# Patient Record
Sex: FEMALE | Race: BLACK OR AFRICAN AMERICAN | NOT HISPANIC OR LATINO | Employment: OTHER | URBAN - METROPOLITAN AREA
[De-identification: names, ages, dates, MRNs, and addresses within clinical notes are randomized per-mention and may not be internally consistent; named-entity substitution may affect disease eponyms.]

---

## 2017-02-08 ENCOUNTER — ALLSCRIPTS OFFICE VISIT (OUTPATIENT)
Dept: OTHER | Facility: OTHER | Age: 57
End: 2017-02-08

## 2017-05-19 ENCOUNTER — ALLSCRIPTS OFFICE VISIT (OUTPATIENT)
Dept: OTHER | Facility: OTHER | Age: 57
End: 2017-05-19

## 2017-06-05 ENCOUNTER — TRANSCRIBE ORDERS (OUTPATIENT)
Dept: ADMINISTRATIVE | Facility: HOSPITAL | Age: 57
End: 2017-06-05

## 2017-06-05 ENCOUNTER — APPOINTMENT (OUTPATIENT)
Dept: LAB | Facility: MEDICAL CENTER | Age: 57
End: 2017-06-05
Payer: MEDICARE

## 2017-06-05 DIAGNOSIS — R80.9 PROTEINURIA: ICD-10-CM

## 2017-06-05 DIAGNOSIS — E11.39 TYPE 2 DIABETES MELLITUS WITH OTHER DIABETIC OPHTHALMIC COMPLICATION (HCC): ICD-10-CM

## 2017-06-05 DIAGNOSIS — I10 ESSENTIAL (PRIMARY) HYPERTENSION: ICD-10-CM

## 2017-06-05 DIAGNOSIS — E78.5 HYPERLIPIDEMIA: ICD-10-CM

## 2017-06-05 DIAGNOSIS — E11.65 TYPE 2 DIABETES MELLITUS WITH HYPERGLYCEMIA (HCC): ICD-10-CM

## 2017-06-05 LAB
ALBUMIN SERPL BCP-MCNC: 3.3 G/DL (ref 3.5–5)
ALP SERPL-CCNC: 183 U/L (ref 46–116)
ALT SERPL W P-5'-P-CCNC: 24 U/L (ref 12–78)
ANION GAP SERPL CALCULATED.3IONS-SCNC: 6 MMOL/L (ref 4–13)
AST SERPL W P-5'-P-CCNC: 17 U/L (ref 5–45)
BILIRUB SERPL-MCNC: 0.76 MG/DL (ref 0.2–1)
BUN SERPL-MCNC: 16 MG/DL (ref 5–25)
CALCIUM SERPL-MCNC: 9.2 MG/DL (ref 8.3–10.1)
CHLORIDE SERPL-SCNC: 109 MMOL/L (ref 100–108)
CO2 SERPL-SCNC: 29 MMOL/L (ref 21–32)
CREAT SERPL-MCNC: 0.84 MG/DL (ref 0.6–1.3)
CREAT UR-MCNC: 155 MG/DL
EST. AVERAGE GLUCOSE BLD GHB EST-MCNC: 154 MG/DL
GFR SERPL CREATININE-BSD FRML MDRD: >60 ML/MIN/1.73SQ M
GLUCOSE P FAST SERPL-MCNC: 157 MG/DL (ref 65–99)
HBA1C MFR BLD: 7 % (ref 4.2–6.3)
MICROALBUMIN UR-MCNC: 339 MG/L (ref 0–20)
MICROALBUMIN/CREAT 24H UR: 219 MG/G CREATININE (ref 0–30)
POTASSIUM SERPL-SCNC: 3.6 MMOL/L (ref 3.5–5.3)
PROT SERPL-MCNC: 7.7 G/DL (ref 6.4–8.2)
SODIUM SERPL-SCNC: 144 MMOL/L (ref 136–145)

## 2017-06-05 PROCEDURE — 36415 COLL VENOUS BLD VENIPUNCTURE: CPT

## 2017-06-05 PROCEDURE — 80053 COMPREHEN METABOLIC PANEL: CPT

## 2017-06-05 PROCEDURE — 82043 UR ALBUMIN QUANTITATIVE: CPT

## 2017-06-05 PROCEDURE — 82570 ASSAY OF URINE CREATININE: CPT

## 2017-06-05 PROCEDURE — 83036 HEMOGLOBIN GLYCOSYLATED A1C: CPT

## 2017-06-06 ENCOUNTER — GENERIC CONVERSION - ENCOUNTER (OUTPATIENT)
Dept: OTHER | Facility: OTHER | Age: 57
End: 2017-06-06

## 2017-06-12 ENCOUNTER — ALLSCRIPTS OFFICE VISIT (OUTPATIENT)
Dept: OTHER | Facility: OTHER | Age: 57
End: 2017-06-12

## 2017-09-07 DIAGNOSIS — E78.5 HYPERLIPIDEMIA: ICD-10-CM

## 2017-09-07 DIAGNOSIS — E11.65 TYPE 2 DIABETES MELLITUS WITH HYPERGLYCEMIA (HCC): ICD-10-CM

## 2017-09-07 DIAGNOSIS — E11.39 TYPE 2 DIABETES MELLITUS WITH OTHER DIABETIC OPHTHALMIC COMPLICATION (HCC): ICD-10-CM

## 2017-09-20 ENCOUNTER — GENERIC CONVERSION - ENCOUNTER (OUTPATIENT)
Dept: OTHER | Facility: OTHER | Age: 57
End: 2017-09-20

## 2017-09-20 ENCOUNTER — TRANSCRIBE ORDERS (OUTPATIENT)
Dept: ADMINISTRATIVE | Facility: HOSPITAL | Age: 57
End: 2017-09-20

## 2017-09-20 ENCOUNTER — APPOINTMENT (OUTPATIENT)
Dept: LAB | Facility: MEDICAL CENTER | Age: 57
End: 2017-09-20
Payer: MEDICARE

## 2017-09-20 DIAGNOSIS — E78.5 HYPERLIPIDEMIA: ICD-10-CM

## 2017-09-20 DIAGNOSIS — E11.39 TYPE 2 DIABETES MELLITUS WITH OTHER DIABETIC OPHTHALMIC COMPLICATION (HCC): ICD-10-CM

## 2017-09-20 DIAGNOSIS — E11.65 TYPE 2 DIABETES MELLITUS WITH HYPERGLYCEMIA (HCC): ICD-10-CM

## 2017-09-20 LAB
ALBUMIN SERPL BCP-MCNC: 3 G/DL (ref 3.5–5)
ALP SERPL-CCNC: 172 U/L (ref 46–116)
ALT SERPL W P-5'-P-CCNC: 20 U/L (ref 12–78)
ANION GAP SERPL CALCULATED.3IONS-SCNC: 4 MMOL/L (ref 4–13)
AST SERPL W P-5'-P-CCNC: 16 U/L (ref 5–45)
BILIRUB SERPL-MCNC: 0.78 MG/DL (ref 0.2–1)
BUN SERPL-MCNC: 20 MG/DL (ref 5–25)
CALCIUM SERPL-MCNC: 8.7 MG/DL (ref 8.3–10.1)
CHLORIDE SERPL-SCNC: 105 MMOL/L (ref 100–108)
CHOLEST SERPL-MCNC: 140 MG/DL (ref 50–200)
CO2 SERPL-SCNC: 31 MMOL/L (ref 21–32)
CREAT SERPL-MCNC: 0.89 MG/DL (ref 0.6–1.3)
CREAT UR-MCNC: 152 MG/DL
EST. AVERAGE GLUCOSE BLD GHB EST-MCNC: 189 MG/DL
GFR SERPL CREATININE-BSD FRML MDRD: 83 ML/MIN/1.73SQ M
GLUCOSE P FAST SERPL-MCNC: 113 MG/DL (ref 65–99)
HBA1C MFR BLD: 8.2 % (ref 4.2–6.3)
HDLC SERPL-MCNC: 46 MG/DL (ref 40–60)
LDLC SERPL CALC-MCNC: 79 MG/DL (ref 0–100)
MICROALBUMIN UR-MCNC: 795 MG/L (ref 0–20)
MICROALBUMIN/CREAT 24H UR: 523 MG/G CREATININE (ref 0–30)
POTASSIUM SERPL-SCNC: 3.3 MMOL/L (ref 3.5–5.3)
PROT SERPL-MCNC: 7.3 G/DL (ref 6.4–8.2)
SODIUM SERPL-SCNC: 140 MMOL/L (ref 136–145)
TRIGL SERPL-MCNC: 74 MG/DL

## 2017-09-20 PROCEDURE — 36415 COLL VENOUS BLD VENIPUNCTURE: CPT

## 2017-09-20 PROCEDURE — 80053 COMPREHEN METABOLIC PANEL: CPT

## 2017-09-20 PROCEDURE — 82043 UR ALBUMIN QUANTITATIVE: CPT

## 2017-09-20 PROCEDURE — 83036 HEMOGLOBIN GLYCOSYLATED A1C: CPT

## 2017-09-20 PROCEDURE — 82570 ASSAY OF URINE CREATININE: CPT

## 2017-09-20 PROCEDURE — 80061 LIPID PANEL: CPT

## 2017-09-26 ENCOUNTER — ALLSCRIPTS OFFICE VISIT (OUTPATIENT)
Dept: OTHER | Facility: OTHER | Age: 57
End: 2017-09-26

## 2017-10-20 ENCOUNTER — APPOINTMENT (EMERGENCY)
Dept: RADIOLOGY | Facility: HOSPITAL | Age: 57
End: 2017-10-20
Payer: MEDICARE

## 2017-10-20 ENCOUNTER — GENERIC CONVERSION - ENCOUNTER (OUTPATIENT)
Dept: OTHER | Facility: OTHER | Age: 57
End: 2017-10-20

## 2017-10-20 ENCOUNTER — HOSPITAL ENCOUNTER (EMERGENCY)
Facility: HOSPITAL | Age: 57
Discharge: HOME | End: 2017-10-20
Attending: EMERGENCY MEDICINE
Payer: MEDICARE

## 2017-10-20 VITALS
DIASTOLIC BLOOD PRESSURE: 63 MMHG | HEART RATE: 92 BPM | TEMPERATURE: 99.6 F | RESPIRATION RATE: 20 BRPM | SYSTOLIC BLOOD PRESSURE: 125 MMHG | OXYGEN SATURATION: 98 % | WEIGHT: 293 LBS

## 2017-10-20 DIAGNOSIS — J20.9 ACUTE BRONCHITIS, UNSPECIFIED ORGANISM: Primary | ICD-10-CM

## 2017-10-20 LAB
ALBUMIN SERPL BCP-MCNC: 2.9 G/DL (ref 3.5–5)
ALP SERPL-CCNC: 159 U/L (ref 46–116)
ALT SERPL W P-5'-P-CCNC: 21 U/L (ref 12–78)
ANION GAP SERPL CALCULATED.3IONS-SCNC: 4 MMOL/L (ref 4–13)
APTT PPP: 26 SECONDS (ref 23–35)
AST SERPL W P-5'-P-CCNC: 21 U/L (ref 5–45)
BASOPHILS # BLD AUTO: 0.03 THOUSANDS/ΜL (ref 0–0.1)
BASOPHILS NFR BLD AUTO: 0 % (ref 0–1)
BILIRUB SERPL-MCNC: 1.01 MG/DL (ref 0.2–1)
BUN SERPL-MCNC: 15 MG/DL (ref 5–25)
CALCIUM SERPL-MCNC: 9 MG/DL (ref 8.3–10.1)
CHLORIDE SERPL-SCNC: 102 MMOL/L (ref 100–108)
CO2 SERPL-SCNC: 30 MMOL/L (ref 21–32)
CREAT SERPL-MCNC: 0.99 MG/DL (ref 0.6–1.3)
EOSINOPHIL # BLD AUTO: 0.2 THOUSAND/ΜL (ref 0–0.61)
EOSINOPHIL NFR BLD AUTO: 2 % (ref 0–6)
ERYTHROCYTE [DISTWIDTH] IN BLOOD BY AUTOMATED COUNT: 15.7 % (ref 11.6–15.1)
GFR SERPL CREATININE-BSD FRML MDRD: 72 ML/MIN/1.73SQ M
GLUCOSE SERPL-MCNC: 180 MG/DL (ref 65–140)
HCT VFR BLD AUTO: 35.3 % (ref 34.8–46.1)
HGB BLD-MCNC: 11.3 G/DL (ref 11.5–15.4)
INR PPP: 1.01 (ref 0.86–1.16)
LACTATE SERPL-SCNC: 0.9 MMOL/L (ref 0.5–2)
LYMPHOCYTES # BLD AUTO: 2.51 THOUSANDS/ΜL (ref 0.6–4.47)
LYMPHOCYTES NFR BLD AUTO: 21 % (ref 14–44)
MCH RBC QN AUTO: 24.7 PG (ref 26.8–34.3)
MCHC RBC AUTO-ENTMCNC: 32 G/DL (ref 31.4–37.4)
MCV RBC AUTO: 77 FL (ref 82–98)
MONOCYTES # BLD AUTO: 0.96 THOUSAND/ΜL (ref 0.17–1.22)
MONOCYTES NFR BLD AUTO: 8 % (ref 4–12)
NEUTROPHILS # BLD AUTO: 8.33 THOUSANDS/ΜL (ref 1.85–7.62)
NEUTS SEG NFR BLD AUTO: 69 % (ref 43–75)
NRBC BLD AUTO-RTO: 0 /100 WBCS
NT-PROBNP SERPL-MCNC: 66 PG/ML
PLATELET # BLD AUTO: 272 THOUSANDS/UL (ref 149–390)
PMV BLD AUTO: 10.4 FL (ref 8.9–12.7)
POTASSIUM SERPL-SCNC: 3.8 MMOL/L (ref 3.5–5.3)
PROT SERPL-MCNC: 7.7 G/DL (ref 6.4–8.2)
PROTHROMBIN TIME: 13.3 SECONDS (ref 12.1–14.4)
RBC # BLD AUTO: 4.57 MILLION/UL (ref 3.81–5.12)
SODIUM SERPL-SCNC: 136 MMOL/L (ref 136–145)
SPECIMEN SOURCE: NORMAL
TROPONIN I BLD-MCNC: 0.01 NG/ML (ref 0–0.08)
WBC # BLD AUTO: 12.03 THOUSAND/UL (ref 4.31–10.16)

## 2017-10-20 PROCEDURE — 93005 ELECTROCARDIOGRAM TRACING: CPT | Performed by: EMERGENCY MEDICINE

## 2017-10-20 PROCEDURE — 85730 THROMBOPLASTIN TIME PARTIAL: CPT | Performed by: EMERGENCY MEDICINE

## 2017-10-20 PROCEDURE — 36415 COLL VENOUS BLD VENIPUNCTURE: CPT | Performed by: EMERGENCY MEDICINE

## 2017-10-20 PROCEDURE — 96374 THER/PROPH/DIAG INJ IV PUSH: CPT

## 2017-10-20 PROCEDURE — 71020 HB CHEST X-RAY 2VW FRONTAL&LATL: CPT

## 2017-10-20 PROCEDURE — 85025 COMPLETE CBC W/AUTO DIFF WBC: CPT | Performed by: EMERGENCY MEDICINE

## 2017-10-20 PROCEDURE — 80053 COMPREHEN METABOLIC PANEL: CPT | Performed by: EMERGENCY MEDICINE

## 2017-10-20 PROCEDURE — 99284 EMERGENCY DEPT VISIT MOD MDM: CPT

## 2017-10-20 PROCEDURE — 84484 ASSAY OF TROPONIN QUANT: CPT

## 2017-10-20 PROCEDURE — 83880 ASSAY OF NATRIURETIC PEPTIDE: CPT | Performed by: EMERGENCY MEDICINE

## 2017-10-20 PROCEDURE — 85610 PROTHROMBIN TIME: CPT | Performed by: EMERGENCY MEDICINE

## 2017-10-20 PROCEDURE — 83605 ASSAY OF LACTIC ACID: CPT | Performed by: EMERGENCY MEDICINE

## 2017-10-20 RX ORDER — AZITHROMYCIN 250 MG/1
250 TABLET, FILM COATED ORAL DAILY
Qty: 6 TABLET | Refills: 0 | Status: SHIPPED | OUTPATIENT
Start: 2017-10-20 | End: 2017-10-25

## 2017-10-20 RX ORDER — VALSARTAN AND HYDROCHLOROTHIAZIDE 320; 25 MG/1; MG/1
1 TABLET, FILM COATED ORAL DAILY
COMMUNITY
End: 2018-06-26 | Stop reason: HOSPADM

## 2017-10-20 RX ORDER — GLUCOSAMINE HCL 500 MG
3000 TABLET ORAL DAILY
COMMUNITY
End: 2020-05-21

## 2017-10-20 RX ORDER — PROMETHAZINE HYDROCHLORIDE AND CODEINE PHOSPHATE 6.25; 1 MG/5ML; MG/5ML
5 SYRUP ORAL EVERY 4 HOURS PRN
Qty: 120 ML | Refills: 0 | Status: SHIPPED | OUTPATIENT
Start: 2017-10-20 | End: 2018-02-06

## 2017-10-20 RX ORDER — LABETALOL HYDROCHLORIDE 5 MG/ML
20 INJECTION, SOLUTION INTRAVENOUS ONCE
Status: COMPLETED | OUTPATIENT
Start: 2017-10-20 | End: 2017-10-20

## 2017-10-20 RX ORDER — ATORVASTATIN CALCIUM 40 MG/1
40 TABLET, FILM COATED ORAL DAILY
COMMUNITY
Start: 2015-07-10 | End: 2018-07-02 | Stop reason: SDUPTHER

## 2017-10-20 RX ORDER — LABETALOL 200 MG/1
200 TABLET, FILM COATED ORAL 2 TIMES DAILY
COMMUNITY
End: 2018-12-12

## 2017-10-20 RX ADMIN — LABETALOL 20 MG/4 ML (5 MG/ML) INTRAVENOUS SYRINGE 20 MG: at 22:09

## 2017-10-21 NOTE — ED PROVIDER NOTES
History  Chief Complaint   Patient presents with    Cough     Minimally productive cough, chest congestion, nasal congestion  Symptoms since Monday  Denies fevers  Reports fatigue and feeling dizzy/off-balance  49-year-old female with a history of hypertension and diabetes presents to the emergency depart with a 2 day history of URI symptoms consisting of nonproductive cough, nasal congestion, conjunctivitis and shortness of breath  No chest pain  No nausea or vomiting  No known ill contacts  Patient did not take anything over-the-counter for her symptoms  History provided by:  Patient   used: No    Cough   Cough characteristics:  Non-productive  Severity:  Moderate  Onset quality:  Gradual  Duration:  2 days  Timing:  Intermittent  Progression:  Worsening  Chronicity:  New  Smoker: no    Context: upper respiratory infection    Relieved by:  None tried  Worsened by:  Nothing  Ineffective treatments:  None tried  Associated symptoms: shortness of breath and sinus congestion    Associated symptoms: no chest pain, no chills, no diaphoresis, no ear fullness, no ear pain, no eye discharge, no fever, no headaches, no myalgias, no rash, no rhinorrhea, no sore throat and no wheezing    Shortness of breath:     Severity:  Moderate    Onset quality:  Gradual    Duration:  2 days    Timing:  Constant    Progression:  Unchanged  Risk factors: no recent infection and no recent travel        Prior to Admission Medications   Prescriptions Last Dose Informant Patient Reported? Taking?    Cholecalciferol (VITAMIN D3) 2000 units capsule   Yes No   Sig: Take by mouth   Insulin Glargine (LANTUS SOLOSTAR) 100 UNIT/ML SOPN   Yes Yes   Sig: Inject 30 Units under the skin Medrol Dose Pack scheduling ONLY     atorvastatin (LIPITOR) 40 mg tablet   Yes Yes   Sig: Take by mouth   insulin lispro (HUMALOG KWIKPEN) 100 Units/mL SOPN   Yes Yes   Sig: Inject under the skin 6 units with breakfast, 6 units with lunch and 14 units with dinner    labetalol (NORMODYNE) 200 mg tablet   Yes Yes   Sig: Take 200 mg by mouth 2 (two) times a day   metFORMIN (GLUCOPHAGE) 500 mg tablet   Yes Yes   Sig: Take 500 mg by mouth 2 (two) times a day with meals     valsartan-hydrochlorothiazide (DIOVAN-HCT) 320-25 MG per tablet   Yes Yes   Sig: Take 1 tablet by mouth daily      Facility-Administered Medications: None       Past Medical History:   Diagnosis Date    Diabetes mellitus ([place] [address] )     Hypertension     Seizures ([place] [address] )        Past Surgical History:   Procedure Laterality Date    HYSTERECTOMY         History reviewed  No pertinent family history  I have reviewed and agree with the history as documented  Social History   Substance Use Topics    Smoking status: Never Smoker    Smokeless tobacco: Never Used    Alcohol use No        Review of Systems   Constitutional: Negative  Negative for chills, diaphoresis, fatigue and fever  HENT: Positive for congestion  Negative for ear pain, rhinorrhea and sore throat  Eyes: Negative  Negative for discharge, redness and itching  Respiratory: Positive for cough and shortness of breath  Negative for apnea, chest tightness and wheezing  Cardiovascular: Negative for chest pain, palpitations and leg swelling  Gastrointestinal: Negative  Negative for abdominal pain  Endocrine: Negative  Genitourinary: Negative  Negative for flank pain, frequency and urgency  Musculoskeletal: Negative  Negative for back pain and myalgias  Skin: Negative  Negative for rash  Allergic/Immunologic: Negative  Neurological: Negative  Negative for dizziness, syncope, weakness, light-headedness, numbness and headaches  Hematological: Negative  All other systems reviewed and are negative        Physical Exam  ED Triage Vitals [10/20/17 1952]   Temperature Pulse Respirations Blood Pressure SpO2   99 6 °F (37 6 °C) 103 (!) 24 (!) 209/100 97 %      Temp Source Heart Rate Source Patient Position - Orthostatic VS BP Location FiO2 (%)   Oral Monitor Sitting Right arm --      Pain Score       No Pain           Physical Exam   Constitutional: She is oriented to person, place, and time  She appears well-developed and well-nourished  Non-toxic appearance  She does not have a sickly appearance  She does not appear ill  No distress  HENT:   Head: Normocephalic and atraumatic  Right Ear: Tympanic membrane and external ear normal    Left Ear: Tympanic membrane and external ear normal    Nose: Nose normal    Mouth/Throat: Oropharynx is clear and moist  No oropharyngeal exudate  Eyes: EOM are normal  Pupils are equal, round, and reactive to light  Right eye exhibits no discharge  Left eye exhibits no discharge  Right conjunctiva is injected  Left conjunctiva is injected  Neck: Normal range of motion  Neck supple  Cardiovascular: Regular rhythm and normal heart sounds  Tachycardia present  Exam reveals no gallop and no friction rub  No murmur heard  Pulmonary/Chest: Effort normal and breath sounds normal  No stridor  No respiratory distress  She has no wheezes  She has no rales  She exhibits no tenderness  Abdominal: Soft  Bowel sounds are normal  She exhibits no distension and no mass  There is no tenderness  No hernia  Musculoskeletal: Normal range of motion  She exhibits no edema, tenderness or deformity  Lymphadenopathy:     She has no cervical adenopathy  Neurological: She is alert and oriented to person, place, and time  She has normal reflexes  She exhibits normal muscle tone  Skin: Skin is warm and dry  No rash noted  She is not diaphoretic  No erythema  No pallor  Psychiatric: She has a normal mood and affect  Nursing note and vitals reviewed        ED Medications  Medications   labetalol (NORMODYNE) injection 20 mg (20 mg Intravenous Given 10/20/17 2209)       Diagnostic Studies  Labs Reviewed   CBC AND DIFFERENTIAL - Abnormal        Result Value Ref Range Status    WBC 12 03 (*) 4 31 - 10 16 Thousand/uL Final    Hemoglobin 11 3 (*) 11 5 - 15 4 g/dL Final    MCV 77 (*) 82 - 98 fL Final    MCH 24 7 (*) 26 8 - 34 3 pg Final    RDW 15 7 (*) 11 6 - 15 1 % Final    Neutrophils Absolute 8 33 (*) 1 85 - 7 62 Thousands/µL Final    RBC 4 57  3 81 - 5 12 Million/uL Final    Hematocrit 35 3  34 8 - 46 1 % Final    MCHC 32 0  31 4 - 37 4 g/dL Final    MPV 10 4  8 9 - 12 7 fL Final    Platelets 047  157 - 390 Thousands/uL Final    nRBC 0  /100 WBCs Final    Neutrophils Relative 69  43 - 75 % Final    Lymphocytes Relative 21  14 - 44 % Final    Monocytes Relative 8  4 - 12 % Final    Eosinophils Relative 2  0 - 6 % Final    Basophils Relative 0  0 - 1 % Final    Lymphocytes Absolute 2 51  0 60 - 4 47 Thousands/µL Final    Monocytes Absolute 0 96  0 17 - 1 22 Thousand/µL Final    Eosinophils Absolute 0 20  0 00 - 0 61 Thousand/µL Final    Basophils Absolute 0 03  0 00 - 0 10 Thousands/µL Final   COMPREHENSIVE METABOLIC PANEL - Abnormal     Glucose 180 (*) 65 - 140 mg/dL Final    Comment:   If the patient is fasting, the ADA then defines impaired fasting glucose as > 100 mg/dL and diabetes as > or equal to 123 mg/dL  Specimen collection should occur prior to Sulfasalazine administration due to the potential for falsely depressed results  Specimen collection should occur prior to Sulfapyridine administration due to the potential for falsely elevated results      Alkaline Phosphatase 159 (*) 46 - 116 U/L Final    Albumin 2 9 (*) 3 5 - 5 0 g/dL Final    Total Bilirubin 1 01 (*) 0 20 - 1 00 mg/dL Final    Sodium 136  136 - 145 mmol/L Final    Potassium 3 8  3 5 - 5 3 mmol/L Final    Chloride 102  100 - 108 mmol/L Final    CO2 30  21 - 32 mmol/L Final    Anion Gap 4  4 - 13 mmol/L Final    BUN 15  5 - 25 mg/dL Final    Creatinine 0 99  0 60 - 1 30 mg/dL Final    Comment: Standardized to IDMS reference method    Calcium 9 0  8 3 - 10 1 mg/dL Final    AST 21  5 - 45 U/L Final    Comment:   Specimen collection should occur prior to Sulfasalazine administration due to the potential for falsely depressed results  ALT 21  12 - 78 U/L Final    Comment:   Specimen collection should occur prior to Sulfasalazine administration due to the potential for falsely depressed results  Total Protein 7 7  6 4 - 8 2 g/dL Final    eGFR 72  ml/min/1 73sq m Final    Narrative:     National Kidney Disease Education Program recommendations are as follows:  GFR calculation is accurate only with a steady state creatinine  Chronic Kidney disease less than 60 ml/min/1 73 sq  meters  Kidney failure less than 15 ml/min/1 73 sq  meters  PROTIME-INR - Normal    Protime 13 3  12 1 - 14 4 seconds Final    INR 1 01  0 86 - 1 16 Final   APTT - Normal    PTT 26  23 - 35 seconds Final    Narrative: Therapeutic Heparin Range = 60-90 seconds   NT-BNP PRO (BRAIN NATRIURETIC PEPTIDE) - Normal    NT-proBNP 66  <125 pg/mL Final   LACTIC ACID, PLASMA - Normal    LACTIC ACID 0 9  0 5 - 2 0 mmol/L Final    Narrative:     Result may be elevated if tourniquet was used during collection  POCT TROPONIN - Normal    POC Troponin I 0 01  0 00 - 0 08 ng/ml Final    Specimen Type VENOUS   Final    Narrative:     [employer] i-Stat handheld analyzer 99% cutoff is > 0 08ng/mL in network Emergency Departments    o cTnI 99% cutoff is useful only when applied to patients in the clinical setting of myocardial ischemia  o cTnI 99% cutoff should be interpreted in the context of clinical history, ECG findings and possibly cardiac imaging to establish correct diagnosis  o cTnI 99% cutoff may be suggestive but clearly not indicative of a coronary event without the clinical setting of myocardial ischemia  XR chest 2 views   ED Interpretation   Increased interstitial marking          Procedures  Procedures      Phone Contacts  ED Phone Contact    ED Course  ED Course as of Oct 20 2329   Fri Oct 20, 2017   2322 Patient feeling better  Stable for discharge  MDM  Number of Diagnoses or Management Options  Diagnosis management comments: 38-year-old female presents to the emergency department with a 2 day history of URI symptoms consisting of cough conjunctivitis and shortness of breath  On exam she does not appear acutely ill  She sounds congested and has definite bilateral conjunctivitis  Her lungs are clear but she is slightly tachycardic and tachypneic  Will check basic labs, EKG, chest x-ray and reassess  This may be URI but will rule out CHF  Doubt acute coronary syndrome  Amount and/or Complexity of Data Reviewed  Clinical lab tests: ordered and reviewed  Tests in the radiology section of CPT®: ordered and reviewed  Independent visualization of images, tracings, or specimens: yes      CritCare Time    Disposition  Final diagnoses:   Acute bronchitis, unspecified organism     ED Disposition     ED Disposition Condition Comment    Discharge  Kaitlynn Sportsman discharge to home/self care  Condition at discharge: Good        Follow-up Information     Follow up With Specialties Details Why [address] [facility], DO Internal Medicine Schedule an appointment as soon as possible for a visit in 3 days If symptoms worsen or return to the emergency department [address] [facility] [address] [facility]  [phone]          Patient's Medications   Discharge Prescriptions    AZITHROMYCIN (ZITHROMAX) 250 MG TABLET    Take 1 tablet by mouth daily for 5 days Take first 2 tablets together, then 1 every day until finished         Start Date: 10/20/2017End Date: 10/25/2017       Order Dose: 250 mg       Quantity: 6 tablet    Refills: 0    PROMETHAZINE-CODEINE (PHENERGAN WITH CODEINE) 6 25-10 MG/5 ML SYRUP    Take 5 mL by mouth every 4 (four) hours as needed for cough       Start Date: 10/20/2017End Date: --       Order Dose: 5 mL       Quantity: 120 mL    Refills: 0     No discharge procedures on file     ED Provider  Electronically Signed by       Radha Newton DO  10/20/17 9720

## 2017-10-21 NOTE — ED PROCEDURE NOTE
PROCEDURE  ECG 12 Lead Documentation  Date/Time: 10/20/2017 9:46 PM  Performed by: Alethea Lucas  Authorized by: Alethea Lucas     ECG reviewed by me, the ED Provider: yes    Patient location:  ED  Previous ECG:     Comparison to cardiac monitor: Yes    Interpretation:     Interpretation: normal    Rate:     ECG rate assessment: normal    Rhythm:     Rhythm: sinus rhythm    Ectopy:     Ectopy: none    QRS:     QRS axis:  Normal  Conduction:     Conduction: normal    ST segments:     ST segments:  Normal  T waves:     T waves: normal

## 2017-10-21 NOTE — DISCHARGE INSTRUCTIONS
Acute Bronchitis   WHAT YOU NEED TO KNOW:   Acute bronchitis is swelling and irritation in the air passages of your lungs  This irritation may cause you to cough or have other breathing problems  Acute bronchitis often starts because of another illness, such as a cold or the flu  The illness spreads from your nose and throat to your windpipe and airways  Bronchitis is often called a chest cold  Acute bronchitis lasts about 3 to 6 weeks and is usually not a serious illness  Your cough can last for several weeks  DISCHARGE INSTRUCTIONS:   Return to the emergency department if:   · You cough up blood  · Your lips or fingernails turn blue  · You feel like you are not getting enough air when you breathe  Contact your healthcare provider if:   · You have a fever  · Your breathing problems do not go away or get worse  · Your cough does not get better within 4 weeks  · You have questions or concerns about your condition or care  Self-care:   · Get more rest   Rest helps your body to heal  Slowly start to do more each day  Rest when you feel it is needed  · Avoid irritants in the air  Avoid chemicals, fumes, and dust  Wear a face mask if you must work around dust or fumes  Stay inside on days when air pollution levels are high  If you have allergies, stay inside when pollen counts are high  Do not use aerosol products, such as spray-on deodorant, bug spray, and hair spray  · Do not smoke or be around others who smoke  Nicotine and other chemicals in cigarettes and cigars damages the cilia that move mucus out of your lungs  Ask your healthcare provider for information if you currently smoke and need help to quit  E-cigarettes or smokeless tobacco still contain nicotine  Talk to your healthcare provider before you use these products  · Drink liquids as directed  Liquids help keep your air passages moist and help you cough up mucus   You may need to drink more liquids when you have acute bronchitis  Ask how much liquid to drink each day and which liquids are best for you  · Use a humidifier or vaporizer  Use a cool mist humidifier or a vaporizer to increase air moisture in your home  This may make it easier for you to breathe and help decrease your cough  Decrease risk for acute bronchitis:   · Get the vaccinations you need  Ask your healthcare provider if you should get vaccinated against the flu or pneumonia  · Prevent the spread of germs  You can decrease your risk of acute bronchitis and other illnesses by doing the following:     [facility] AUTHORITY your hands often with soap and water  Carry germ-killing hand lotion or gel with you  You can use the lotion or gel to clean your hands when soap and water are not available  ¨ Do not touch your eyes, nose, or mouth unless you have washed your hands first     ¨ Always cover your mouth when you cough to prevent the spread of germs  It is best to cough into a tissue or your shirt sleeve instead of into your hand  Ask those around you cover their mouths when they cough  ¨ Try to avoid people who have a cold or the flu  If you are sick, stay away from others as much as possible  Medicines: Your healthcare provider may  give you any of the following:  · Ibuprofen or acetaminophen  are medicines that help lower your fever  They are available without a doctor's order  Ask your healthcare provider which medicine is right for you  Ask how much to take and how often to take it  Follow directions  These medicines can cause stomach bleeding if not taken correctly  Ibuprofen can cause kidney damage  Do not take ibuprofen if you have kidney disease, an ulcer, or allergies to aspirin  Acetaminophen can cause liver damage  Do not take more than 4,000 milligrams in 24 hours  · Decongestants  help loosen mucus in your lungs and make it easier to cough up  This can help you breathe easier  · Cough suppressants  decrease your urge to cough   If your cough produces mucus, do not take a cough suppressant unless your healthcare provider tells you to  Your healthcare provider may suggest that you take a cough suppressant at night so you can rest     · Inhalers  may be given  Your healthcare provider may give you one or more inhalers to help you breathe easier and cough less  An inhaler gives your medicine to open your airways  Ask your healthcare provider to show you how to use your inhaler correctly  · Take your medicine as directed  Contact your healthcare provider if you think your medicine is not helping or if you have side effects  Tell him of her if you are allergic to any medicine  Keep a list of the medicines, vitamins, and herbs you take  Include the amounts, and when and why you take them  Bring the list or the pill bottles to follow-up visits  Carry your medicine list with you in case of an emergency  Follow up with your healthcare provider as directed:  Write down questions you have so you will remember to ask them during your follow-up visits  © 2017 [address] [employer] Information is for End User's use only and may not be sold, redistributed or otherwise used for commercial purposes  All illustrations and images included in CareNotes® are the copyrighted property of [employer]  or [employer] Hernández  The above information is an  only  It is not intended as medical advice for individual conditions or treatments  Talk to your doctor, nurse or pharmacist before following any medical regimen to see if it is safe and effective for you

## 2017-10-22 LAB
ATRIAL RATE: 92 BPM
P AXIS: 65 DEGREES
PR INTERVAL: 168 MS
QRS AXIS: 20 DEGREES
QRSD INTERVAL: 116 MS
QT INTERVAL: 390 MS
QTC INTERVAL: 482 MS
T WAVE AXIS: 60 DEGREES
VENTRICULAR RATE: 92 BPM

## 2017-12-11 ENCOUNTER — ALLSCRIPTS OFFICE VISIT (OUTPATIENT)
Dept: OTHER | Facility: OTHER | Age: 57
End: 2017-12-11

## 2017-12-11 DIAGNOSIS — E78.5 HYPERLIPIDEMIA: ICD-10-CM

## 2017-12-11 DIAGNOSIS — R09.89 OTHER SPECIFIED SYMPTOMS AND SIGNS INVOLVING THE CIRCULATORY AND RESPIRATORY SYSTEMS: ICD-10-CM

## 2017-12-11 DIAGNOSIS — R06.02 SHORTNESS OF BREATH: ICD-10-CM

## 2017-12-11 DIAGNOSIS — E11.65 TYPE 2 DIABETES MELLITUS WITH HYPERGLYCEMIA (HCC): ICD-10-CM

## 2017-12-11 DIAGNOSIS — E55.9 VITAMIN D DEFICIENCY: ICD-10-CM

## 2017-12-11 DIAGNOSIS — R60.9 EDEMA: ICD-10-CM

## 2017-12-12 ENCOUNTER — GENERIC CONVERSION - ENCOUNTER (OUTPATIENT)
Dept: ENDOCRINOLOGY | Facility: CLINIC | Age: 57
End: 2017-12-12

## 2017-12-16 NOTE — PROGRESS NOTES
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, with long-term current use of insulin (250 52,V58 67) (E11 39,E11 65,Z79 4)   3  Vitamin D deficiency (268 9) (E55 9)   4  Microalbuminuria (791 0) (R80 9)   5  Insulin long-term use (V58 67) (Z79 4)   6  Hypoglycemia (251 2) (E16 2)   7  Hypertension (401 9) (I10)   8  Hyperlipidemia (272 4) (E78 5)   9  LEON (dyspnea on exertion) (786 09) (R06 09)    Plan  Decreased pulses in feet    · VAS LOWER LIMB ARTERIAL DUPLEX, COMPLETE BILATERAL/GRAFTS; SIDE:Bilateral;Status:Active; Requested for:07Vmi1113;    Perform:[facility]; Due:65Ndv3132; Last Updated Nahid Acosta; 12/11/2017 10:18:08 AM;Ordered; For:Decreased pulses in feet; Ordered By:Eden Jacinto;  Diabetes mellitus type 2 with complications, uncontrolled    · *VB - Foot Exam; Status:Complete;   Done: 62DLY3007 11:07AM   Performed: In Office; LGT:59YON7115;NNHLKFG; For:Diabetes mellitus type 2 with complications, uncontrolled; Ordered By:Eden Jacinto;   · Follow-up visit in 6 weeks Evaluation and Treatment  Follow-up  Status: Complete  Done:27Cma6051   Ordered; For: Diabetes mellitus type 2 with complications, uncontrolled; Ordered By: Cris Higginbotham Performed:  Due: 52VZT4855; Last Updated By: Michi Benjamin; 12/11/2017 10:18:14 AM  Diabetes type 2, uncontrolled    · (1) HEMOGLOBIN A1C; Status:Active; Requested for:79Nij3653;    Perform:[facility]; Due:67Xnh1574; Ordered; For:Diabetes type 2, uncontrolled; Ordered By:Eden Jacinto;   · (1) MICROALBUMIN CREATININE RATIO, RANDOM URINE; Status:Active; Requestedfor:09Wfi7881;    Perform:[facility]; Due:34Ddp2309; Ordered; For:Diabetes type 2, uncontrolled; Ordered By:Eden Jacinto;  LEON (dyspnea on exertion)    · *1 - [facility] (CARDIOLOGY ) Co-Management  *  Status: Active Requested for: 34UBG0478   Ordered; For: LEON (dyspnea on exertion); Ordered By: Stephie Stanley Performed:  Due: 76ZAN0425; Last Updated By: Cade Enriquez; 12/11/2017 10:17:55 AM  Care Summary provided  : Yes  Hyperlipidemia    · (1) COMPREHENSIVE METABOLIC PANEL; Status:Active; Requested for:38Rbo7823;    Perform:[facility]; Due:27Ava7845; Ordered;For:Hyperlipidemia; Ordered By:Eden Jacinto;   · (1) T4, FREE; Status:Active; Requested for:46Vjd3066;    Perform:[facility]; Due:04Psp1485; Ordered;For:Hyperlipidemia; Ordered By:Eden Jacinto;   · (1) TSH; Status:Active; Requested for:16Emx5527;    Perform:[facility]; Due:96Hcs9332; Ordered;For:Hyperlipidemia; Ordered By:Eden Jacinto;  Hypertension    · Spironolactone 25 MG Oral Tablet; 1 Tab daily   Rx By: Stephie Stanley; Dispense: 90 Days ; #:90 Tablet; Refill: 1;For: Hypertension; KATHY = N; Verified Transmission to [address] [place] [address] [address]; Last Updated By: System, [employer]; 12/11/2017 10:01:39 AM  Vitamin D deficiency    · (1) VITAMIN D 25-HYDROXY; Status:Active; Requested for:11Dec2017;    Perform:[facility]; Due:05Dhv7059; Ordered; For:Vitamin D deficiency; Ordered By:Eden Jacinto;  Uncontrolled Type 2 Diabetes with retinopathy:  Blood sugars high overall by report, but not checking blood sugars frequently  Increase Lantus to 35 units daily at bedtime for persistent morning hyperglycemia  Take Humalog 10 before breakfast, 6 before lunch, and 14 before dinner plus sliding scale 2 unit per 50 above 150  She does have history of hypoglycemia, so will need to monitor for this with med adjustment  Check Blood sugars 3-4x per day and send log to office for review and med adjustment in two weeks  She will be having eye exam this week advised her to send copy of report  HTN: BP persistently elevated on Amlodipine 10mg daily, labetalol 200mg BID, Valsartan HCTZ 320/25mg daily   Although BP meds seem to be overdue for refill,  she reports compliance with medication and low sodium diet  Since potassium tends to be on the low side/slightly low will add spironolactone 25mg daily  Monitor BP and send BP readings with BG log in two weeks  Complete labs in 1-2 weeks to monitor K/Hypokalemia  Reviewed side effects  Hyperlipidemia: Continue Statin  Exertional Dyspnea: Due to symptoms/risk factors refer to cardiology for eval    Will refer for LEADS study for exertional leg discomfort and decreased pulses on exam   Advised her to take ASA 81mg daily due to symptoms/risk factors  ASA is listed as allergy but she reports this was due to history of Vaginal bleeding but this was prior to her hysterectomy  Follow up in 6 weeks  Chief Complaint  Chief Complaint Free Text Note Form: Follow Up      History of Present Illness  HPI: 60year old female here for follow up of Type 2 Diabetes, HTN, and Hyperlipidemia  Since last visit, has been on antibiotics for bronchitis  Blood sugars have been high in general  Sick x 3+ weeks  Blood sugars were up to 400  Now mostly over 150-200  No hypoglycemia Went to ER 10/20/17 for Bronchitis symptoms and BP was very high in ER  She reports headaches, fatigue  Denies chest pain but when walking to bus stop reports exertional dyspnea and heaviness/tightness in legs- needs to rest Current Diabetes Meds:Lantus 30 units dailyHumalog 6-10 before breakfast6-10 before lunch14 before dinnerMetformin 2 tabs with dinnerChecking blood sugars on average 1x per day and blood sugars have been high overall  No hypoglycemia  for HTN, taking amlodipine, labetalol, and lisinopril HCTZ  does complain of swelling in feet/legs  Reports that she follows low sodium diet and is compliant with medications  She complains of severe fatigue  Has exertional dyspnea and leg cramps that improve with rest  No chest pain  + FH Cad in mother, grandmother, great grandmother  Never had cardiac workup  Doesn't smoke  For hyperlipidemia, taking statin  Foot exam at visit 2/8/17  Eye exam 10/2016  + Retinopathy  Eye exam scheduled for tomorrow  Looking for podiatrist closer to home-- foot exam today at visit 12/11/17  Review of Systems  ROS Reviewed:   ROS reviewed  Endo Adult ROS Female Established v2 Update - [facility]:  Constitutional/General: no recent weight gain,-- no recent weight loss,-- no poor energy/fatigue,-- no increased energy level,-- no insomnia/sleep problems,-- no fever-- and-- no feeling weak  Breasts: no nipple discharge  Heart: high blood pressure, but-- no chest pain/tightness,-- no rapid/racing heart rate-- and-- no palpitations  Genitourinary - Urinary: no frequent urination,-- no excess urination-- and-- no urinating during the night  Eyes: blurred vision, but-- no double vision,-- no bulging eyes,-- no gritty/scratchy eyes-- and-- no excessive tearing  Mouth / Throat: no hoarseness-- and-- no difficulty swallowing  Neck: no lumps,-- no swollen glands,-- no neck pain,-- no neck stiffness-- and-- no enlarged thyroid  Respiratory: no wheezing,-- no asthma-- and-- no persistent cough  Musculoskeletal: no muscle aches/pain,-- joint aches/pain-- and-- no muscle weakness  Skin & Hair: no dry skin,-- no acne,-- the hair texture was not oily,-- hair loss-- and-- no excessive hair growth  Gastrointestinal: no constipation,-- no diarrhea,-- no waking at night to drink-- and-- no stomach ache  Neurological: no blackouts,-- no weakness-- and-- no tremors  Reproductive:  frequency of period is not applicable  -- duration of period is not applicable  -- Date of last menstruation is not applicable  -- regular periods are not applicable  -- discomfort with periods is not applicable  -- excessive bleeding during period is not applicable  -- mood swings are not applicable    Endocrine: no feeling hot frequently,-- no feeling cold frequently,-- no shifts between feeling hot and cold,-- cold hands or feet,-- no excessive sweating,-- no thyroid problems,-- blood sugar problems,-- no excessive thirst,-- no excessive hunger,-- change in shoe size,-- no nausea or vomiting-- and-- no shaky hands  Active Problems  1  Anemia (285 9) (D64 9)   2  Diabetes mellitus type 2 with complications, uncontrolled (250 92) (E11 8,E11 65)   3  Diabetes type 2, uncontrolled (250 02) (E11 65)   4  LEON (dyspnea on exertion) (786 09) (R06 09)   5  Dyslipidemia (272 4) (E78 5)   6  Fatigue (780 79) (R53 83)   7  Hyperlipidemia (272 4) (E78 5)   8  Hypertension (401 9) (I10)   9  Hypoglycemia (251 2) (E16 2)   10  Insulin long-term use (V58 67) (Z79 4)   11  Microalbuminuria (791 0) (R80 9)   12  Obesity (278 00) (E66 9)   13  Peripheral neuropathy (356 9) (G62 9)   14  Scalp avulsion (873 0) (S08  0XXA)   15  Type 2 diabetes mellitus with hyperglycemia (250 00) (E11 65)   16  Type 2 diabetes with ophthalmic manifestation (250 50) (E11 39)   17  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, with long-term  current use of insulin (250 52,V58 67) (E11 39,E11 65,Z79 4)   18  Visual impairment in both eyes (369 3) (H54 3)   19  Vitamin D deficiency (268 9) (E55 9)    Past Medical History  1  History of Diabetes Mellitus (250 00)  Active Problems And Past Medical History Reviewed: The active problems and past medical history were reviewed and updated today  Surgical History  1  History of Cataract Surgery   2  History of Vaginal Hysterectomy  Surgical History Reviewed: The surgical history was reviewed and updated today  Family History  Mother    1  Family history of myocardial infarction (V17 3) (Z82 49)  Maternal Grandmother    2  Family history of myocardial infarction (V17 3) (Z82 49)  Maternal Great Grandmother    3  Family history of diabetes mellitus (V18 0) (Z83 3)   4  Family history of myocardial infarction (V17 3) (Z82 49)  Family History Reviewed: The family history was reviewed and updated today         Social History   · Caffeine use (V49 89) (F15 90)   · Never A Smoker   · Never Drank Alcohol   · No drug use   · Occupation:  Social History Reviewed: The social history was reviewed and updated today  The social history was reviewed and is unchanged  Current Meds   1  Adult Aspirin EC Low Strength 81 MG Oral Tablet Delayed Release; TAKE ONE TABLET   BY MOUTH   DAILY; Therapy: 19OEY8640 to Recorded   2  AmLODIPine Besylate 10 MG Oral Tablet; take 1 tablet by mouth every day; Therapy: 05DRI7704 to [phone])  Requested for: 54KHI1569; Last Rx:02Nov2016 Ordered   3  Atorvastatin Calcium 40 MG Oral Tablet; Take 1 tablet by mouth at bedtime; Therapy: 18SKC7254 to [phone])  Requested for: 92VCV2091; Last Rx:02Nov2016 Ordered   4  [employer]; check 4/day; Therapy: 64DCJ4381 to [phone]  Requested for: 18DEO9172; Last Rx:02Nov2016 Ordered   5  Michael Microlet Lancets Miscellaneous; use three times a day - before breakfast, lunch, and dinner; Therapy: 70NGE7693 to [phone])  Requested for: 79FDD7235; Last Rx:02Nov2016 Ordered   6  BD Pen Needle Tita U/F 32G X 4 MM Miscellaneous; Use 4/day; Therapy: 11DRI5200 to [phone])  Requested for: 84ODJ6782; Last Rx:02Nov2016 Ordered   7  HumaLOG KwikPen 100 UNIT/ML Subcutaneous Solution Pen-injector; INJECT 10 UNITS BEFORE BREAKFAST AND 6 units before LUNCH AND 14 UNITS BEFORE DINNER; Therapy: 14Apr2016 to (Janie Dykes)  Requested for: 90GHL4424; Last Rx:05Gph8330 Ordered   8  Labetalol HCl - 200 MG Oral Tablet; take 1 tablet by mouth twice a day; Therapy: 05ZLL3802 to (Evaluate:96Mpn5167)  Requested for: 12Jun2017; Last Rx:12Jun2017 Ordered   9  Lantus SoloStar 100 UNIT/ML Subcutaneous Solution Pen-injector; INJECT 35 UNITS UNDER THE SKIN AT BEDTIME; Therapy: 11Aug2015 to [phone])  Requested for: 06LAR9640 Recorded   10   MetFORMIN HCl - 500 MG Oral Tablet; TAKE 2 TABLETS BY MOUTH WITH DINNER; Therapy: 89HIU1843 to (CARIDADCZMITZI:91QOQ1811)  Requested for: 09XPD6264; Last  Rx:07Jyh9590 Ordered   11  Valsartan-Hydrochlorothiazide 320-25 MG Oral Tablet; TAKE 1 TABLET DAILY; Therapy: 06PMR4125 to (Evaluate:93Rxd5593)  Requested for: 90RZO8195; Last  Rx:44Rqw5928 Ordered   12  Vitamin D3 2000 UNIT Oral Tablet; Therapy: (Recorded:50Wzs7901) to Recorded  Medication List Reviewed: The medication list was reviewed and updated today  Allergies  1  Aspirin CHEW   2  Aspirin TABS    Vitals  Vital Signs    Recorded: 11Dec2017 09:12AM   Heart Rate 74   Systolic 375   Diastolic 945   Height 5 ft 8 5 in   Weight 298 lb 0 64 oz   BMI Calculated 44 66   BSA Calculated 2 43     Physical Exam   Constitutional  General appearance: No acute distress, well appearing and well nourished  Eyes  Conjunctiva and lids: No swelling, erythema, or discharge  Pupils: Equal, round and reactive to light  The sclera are anicteric  Extraocular movements are intact  Ears, Nose, Mouth, and Throat  External inspection of ears, nose and lips: Normal    Oropharynx: Normal with no erythema, edema, exudate or lesions  Exam of Head: The head is atraumatic and normocephalic  Neck: The neck is supple  The thyroid is normal in size with no palpable nodules  Pulmonary  Auscultation of lungs: Clear to auscultation bilaterally with normal chest expansion  Cardiovascular  Auscultation of heart: Normal rate and rhythm with no murmurs, gallops or rubs  Examination of pulses: Dorsalis pedal pulses are +2 and equal bilaterally  Examination of carotids: No bruit   Abdomen  Abdomen: Abdomen is soft, non-tender with normal bowel sounds  Lymphatic  Palpation of lymph nodes: No supraclavicular or suboccipital lymphadenopathy  Musculoskeletal  Inspection/palpation of joints, bones, and muscles: Muscle bulk and tone is normal    Skin  Skin and subcutaneous tissue: Normal skin temperature and color  Neurologic  Reflexes: 2+ and symmetric  Motor Strength: Strength is 5/5 bilaterally  Psychiatric  Orientation to person, place and time: Normal    Mood and affect: Affect and attention span are normal      Socks and shoes removed, Right Foot Findings: normal foot, no swelling, no erythema  The right toes were normal   The sensory exam showed normal vibratory sensation at the level of the toes on the right  Socks and shoes removed, Left Foot Findings: normal foot, no swelling, no erythema  The left toes were normal   The sensory exam showed normal vibratory sensation at the level of the toes on the left  Normal tactile sensation with monofilament testing throughout the left foot  Pulses:  1+ in the dorsalis pedis on the right  Pulses:  1+ in the dorsalis pedis on the left  Results/Data  *VB - Foot Exam 55EVU5159 11:07AM Munira Axon     Test Name Result Flag Reference   FOOT EXAM 12/11/17       (1) COMPREHENSIVE METABOLIC PANEL 71LAI4064 19:05OT Mariangel Mike Order Number: [id number]     Test Name Result Flag Reference   SODIUM 140 mmol/L  136-145   POTASSIUM 3 3 mmol/L L 3 5-5 3   CHLORIDE 105 mmol/L  100-108   CARBON DIOXIDE 31 mmol/L  21-32   ANION GAP (CALC) 4 mmol/L  4-13   BLOOD UREA NITROGEN 20 mg/dL  5-25   CREATININE 0 89 mg/dL  0 60-1 30   Standardized to IDMS reference method   CALCIUM 8 7 mg/dL  8 3-10 1   BILI, TOTAL 0 78 mg/dL  0 20-1 00   ALK PHOSPHATAS 172 U/L H    ALT (SGPT) 20 U/L  12-78   Specimen collection should occur prior to Sulfasalazine and/or Sulfapyridine administration due to the potential for falsely depressed results  AST(SGOT) 16 U/L  5-45   Specimen collection should occur prior to Sulfasalazine administration due to the potential for falsely depressed results     ALBUMIN 3 0 g/dL L 3 5-5 0   TOTAL PROTEIN 7 3 g/dL  6 4-8 2   eGFR 83 ml/min/1 73sq m     National Kidney Disease Education Program recommendations are as follows: GFR calculation is accurate only with a steady state creatinine Chronic Kidney disease less than 60 ml/min/1 73 sq  meters Kidney failure less than 15 ml/min/1 73 sq  meters  GLUCOSE FASTING 113 mg/dL H 65-99   Specimen collection should occur prior to Sulfasalazine administration due to the potential for falsely depressed results  Specimen collection should occur prior to Sulfapyridine administration due to the potential for falsely elevated results  (1) HEMOGLOBIN A1C 20Sep2017 07:02AM Stann Cos Order Number: [id number]     Test Name Result Flag Reference   HEMOGLOBIN A1C 8 2 % H 4 2-6 3   EST  AVG  GLUCOSE 189 mg/dl       (1) MICROALBUMIN CREATININE RATIO, RANDOM URINE 50Cdq3306 07:02AM Stann Cos Order Number: [id number]     Test Name Result Flag Reference   MICROALBUMIN/ CREAT R 523 mg/g creatinine H 0-30   MICROALBUMIN,URINE 795 0 mg/L H 0 0-20 0   CREATININE URINE 152 0 mg/dL       (1) LIPID PANEL, FASTING 20Sep2017 07:02AM Stann Cos Order Number: [id number]     Test Name Result Flag Reference   CHOLESTEROL 140 mg/dL     HDL,DIRECT 46 mg/dL  40-60   Specimen collection should occur prior to Metamizole administration due to the potential for falsley depressed results  LDL CHOLESTEROL CALCULATED 79 mg/dL  0-100   Triglyceride:       Normal <150 mg/dl  Borderline High 150-199 mg/dl  High 200-499 mg/dl  Very High >499 mg/dl   Cholesterol:      Desirable <200 mg/dl   Borderline High 200-239 mg/dl   High >239 mg/dl   HDL Cholesterol:      High>59 mg/dL   Low <41 mg/dL   This screening LDL is a calculated result  It does not have the accuracy of the Direct Measured LDL in the monitoring of patients with hyperlipidemia and/or statin therapy  Direct Measure LDL (SBI284) must be ordered separately in these patients     TRIGLYCERIDES 74 mg/dL  <=150   Specimen collection should occur prior to N-Acetylcysteine or Metamizole administration due to the potential for falsely depressed results  (1) COMPREHENSIVE METABOLIC PANEL 64FEJ8637 49:87QF Vickie Will    Order Number: [id number]     Test Name Result Flag Reference   SODIUM 144 mmol/L  136-145   POTASSIUM 3 6 mmol/L  3 5-5 3   CHLORIDE 109 mmol/L H 100-108   CARBON DIOXIDE 29 mmol/L  21-32   ANION GAP (CALC) 6 mmol/L  4-13   BLOOD UREA NITROGEN 16 mg/dL  5-25   CREATININE 0 84 mg/dL  0 60-1 30   Standardized to IDMS reference method   CALCIUM 9 2 mg/dL  8 3-10 1   BILI, TOTAL 0 76 mg/dL  0 20-1 00   ALK PHOSPHATAS 183 U/L H    ALT (SGPT) 24 U/L  12-78   AST(SGOT) 17 U/L  5-45   ALBUMIN 3 3 g/dL L 3 5-5 0   TOTAL PROTEIN 7 7 g/dL  6 4-8 2   eGFR Non-African American      >60 0 ml/min/1 73sq m   [facility] Disease Education Program recommendations are as follows: GFR calculation is accurate only with a steady state creatinine Chronic Kidney disease less than 60 ml/min/1 73 sq  meters Kidney failure less than 15 ml/min/1 73 sq  meters  GLUCOSE FASTING 157 mg/dL H 65-99     (1) MICROALBUMIN CREATININE RATIO, RANDOM URINE 80YBG4048 11:42AM Vickie Will    Order Number: [id number]     Test Name Result Flag Reference   MICROALBUMIN/ CREAT R 219 mg/g creatinine H 0-30   MICROALBUMIN,URINE 339 0 mg/L H 0 0-20 0   CREATININE URINE 155 0 mg/dL       (1) TSH 35Qrp1361 05:02PM Nabila Sawyer    Order Number: [id number]     Test Name Result Flag Reference   TSH 1 850 uIU/mL  0 358-3 740   Patients undergoing fluorescein dye angiography may retain small amounts of fluorescein in the body for 48-72 hours post procedure  Samples containing fluorescein can produce falsely depressed TSH values  If the patient had this procedure,a specimen should be resubmitted post fluorescein clearance       The recommended reference ranges for TSH during pregnancy are as follows: First trimester 0 1 to 2 5 uIU/mL Second trimester  0 2 to 3 0 uIU/mL Third trimester 0 3 to 3 0 uIU/m     (1) T4, FREE 18Hhd0222 05:02PM Toro Ksenia Tanner   TW Order Number: [id number]     Test Name Result Flag Reference   T4,FREE 1 13 ng/dL  0 76-1 46     (1) RENAL FUNCTION PANEL 21Too8238 05:02PM Lizbeth Zaman   TW Order Number: [id number]     Test Name Result Flag Reference   ANION GAP (CALC) 7 mmol/L  4-13   ALBUMIN 3 4 g/dL L 3 5-5 0   BLOOD UREA NITROGEN 20 mg/dL  5-25   National Kidney Disease Education Program recommendations are as follows: GFR calculation is accurate only with a steady state creatinine Chronic Kidney disease less than 60 ml/min/1 73 sq  meters Kidney failure less than 15 ml/min/1 73 sq  meters  CALCIUM 9 3 mg/dL  8 3-10 1   CHLORIDE 105 mmol/L  100-108   CARBON DIOXIDE 28 mmol/L  21-32   CREATININE 0 91 mg/dL  0 60-1 30   Standardized to IDMS reference method   GLUCOSE,RANDM 109 mg/dL     POTASSIUM 3 4 mmol/L L 3 5-5 3   eGFR Non-African American      >60 0 ml/min/1 73sq m   SODIUM 140 mmol/L  136-145   PHOSPHORUS 3 1 mg/dL  2 7-4 5     (1) VITAMIN D 25-HYDROXY 10Fnh4909 05:02PM Lizbeth Zaman   TW Order Number: [id number]     Test Name Result Flag Reference   VIT D 25-HYDROX 31 8 ng/mL  30 0-100 0   This assay is a certified procedure of the CDC Vitamin D Standardization Certification Program (VDSCP)   Deficiency <20ng/ml  Insufficiency 20-30ng/ml  Sufficient  ng/ml   *Patients undergoing fluorescein dye angiography may retain small amounts of fluorescein in the body for 48-72 hours post procedure  Samples containing fluorescein can produce falsely elevated Vitamin D values  If the patient had this procedure, a specimen should be resubmitted post fluorescein clearance  Future Appointments    Date/Time Provider Specialty Site   12/18/2017 09:40 AM DOC Vargas   [facility]   01/30/2018 01:45 PM Ivan Abarca [facility]     Signatures   Electronically signed by : Kelli Jalloh [facility]; Dec 15 2017  1:38PM EST (Author)    Electronically signed by : DOC Leon ; Dec 15 2017  1:46PM EST

## 2017-12-18 ENCOUNTER — TRANSCRIBE ORDERS (OUTPATIENT)
Dept: ADMINISTRATIVE | Facility: HOSPITAL | Age: 57
End: 2017-12-18

## 2017-12-18 ENCOUNTER — ALLSCRIPTS OFFICE VISIT (OUTPATIENT)
Dept: OTHER | Facility: OTHER | Age: 57
End: 2017-12-18

## 2017-12-18 DIAGNOSIS — R06.89 HYPOVENTILATION, IDIOPATHIC: Primary | ICD-10-CM

## 2017-12-18 DIAGNOSIS — R53.83 FATIGUE, UNSPECIFIED TYPE: Primary | ICD-10-CM

## 2017-12-18 DIAGNOSIS — E78.5 HYPERLIPIDEMIA, UNSPECIFIED HYPERLIPIDEMIA TYPE: ICD-10-CM

## 2017-12-18 DIAGNOSIS — G47.30 SLEEP APNEA, UNSPECIFIED TYPE: ICD-10-CM

## 2017-12-18 DIAGNOSIS — R53.83 OTHER FATIGUE: ICD-10-CM

## 2017-12-21 DIAGNOSIS — E11.39 TYPE 2 DIABETES MELLITUS WITH OTHER DIABETIC OPHTHALMIC COMPLICATION (HCC): ICD-10-CM

## 2017-12-21 DIAGNOSIS — E11.65 TYPE 2 DIABETES MELLITUS WITH HYPERGLYCEMIA (HCC): ICD-10-CM

## 2017-12-21 DIAGNOSIS — I10 ESSENTIAL (PRIMARY) HYPERTENSION: ICD-10-CM

## 2018-01-04 ENCOUNTER — TRANSCRIBE ORDERS (OUTPATIENT)
Dept: ADMINISTRATIVE | Facility: HOSPITAL | Age: 58
End: 2018-01-04

## 2018-01-04 DIAGNOSIS — G47.33 OBSTRUCTIVE SLEEP APNEA SYNDROME: Primary | ICD-10-CM

## 2018-01-09 ENCOUNTER — HOSPITAL ENCOUNTER (OUTPATIENT)
Dept: NON INVASIVE DIAGNOSTICS | Facility: HOSPITAL | Age: 58
End: 2018-01-09
Attending: INTERNAL MEDICINE
Payer: MEDICARE

## 2018-01-09 ENCOUNTER — GENERIC CONVERSION - ENCOUNTER (OUTPATIENT)
Dept: CARDIOLOGY CLINIC | Facility: CLINIC | Age: 58
End: 2018-01-09

## 2018-01-09 ENCOUNTER — HOSPITAL ENCOUNTER (OUTPATIENT)
Dept: NUCLEAR MEDICINE | Facility: HOSPITAL | Age: 58
Discharge: HOME | End: 2018-01-09
Attending: INTERNAL MEDICINE

## 2018-01-09 ENCOUNTER — HOSPITAL ENCOUNTER (OUTPATIENT)
Dept: NON INVASIVE DIAGNOSTICS | Facility: HOSPITAL | Age: 58
Discharge: HOME | End: 2018-01-09
Attending: INTERNAL MEDICINE
Payer: MEDICARE

## 2018-01-09 DIAGNOSIS — R60.9 EDEMA: ICD-10-CM

## 2018-01-09 DIAGNOSIS — R53.83 OTHER FATIGUE: ICD-10-CM

## 2018-01-09 DIAGNOSIS — E78.5 HYPERLIPIDEMIA, UNSPECIFIED HYPERLIPIDEMIA TYPE: ICD-10-CM

## 2018-01-09 DIAGNOSIS — R06.89 HYPOVENTILATION, IDIOPATHIC: ICD-10-CM

## 2018-01-09 PROCEDURE — 93306 TTE W/DOPPLER COMPLETE: CPT

## 2018-01-11 ENCOUNTER — HOSPITAL ENCOUNTER (OUTPATIENT)
Dept: NON INVASIVE DIAGNOSTICS | Facility: HOSPITAL | Age: 58
Discharge: HOME | End: 2018-01-11
Attending: INTERNAL MEDICINE
Payer: MEDICARE

## 2018-01-11 ENCOUNTER — GENERIC CONVERSION - ENCOUNTER (OUTPATIENT)
Dept: CARDIOLOGY CLINIC | Facility: CLINIC | Age: 58
End: 2018-01-11

## 2018-01-11 ENCOUNTER — HOSPITAL ENCOUNTER (OUTPATIENT)
Dept: NUCLEAR MEDICINE | Facility: HOSPITAL | Age: 58
Discharge: HOME | End: 2018-01-11
Attending: INTERNAL MEDICINE
Payer: MEDICARE

## 2018-01-11 DIAGNOSIS — R60.9 EDEMA: ICD-10-CM

## 2018-01-11 DIAGNOSIS — R06.02 SHORTNESS OF BREATH: ICD-10-CM

## 2018-01-11 PROCEDURE — 93017 CV STRESS TEST TRACING ONLY: CPT

## 2018-01-11 PROCEDURE — A9502 TC99M TETROFOSMIN: HCPCS

## 2018-01-11 PROCEDURE — 78452 HT MUSCLE IMAGE SPECT MULT: CPT

## 2018-01-11 RX ADMIN — REGADENOSON 0.4 MG: 0.08 INJECTION, SOLUTION INTRAVENOUS at 10:12

## 2018-01-12 VITALS
DIASTOLIC BLOOD PRESSURE: 82 MMHG | HEART RATE: 86 BPM | BODY MASS INDEX: 47.09 KG/M2 | WEIGHT: 293 LBS | SYSTOLIC BLOOD PRESSURE: 162 MMHG | HEIGHT: 66 IN

## 2018-01-12 LAB
CHEST PAIN STATEMENT: NORMAL
MAX DIASTOLIC BP: 91 MMHG
MAX HEART RATE: 118 BPM
MAX PREDICTED HEART RATE: 161 BPM
MAX. SYSTOLIC BP: 205 MMHG
PROTOCOL NAME: NORMAL
REASON FOR TERMINATION: NORMAL
TARGET HR FORMULA: NORMAL
TEST INDICATION: NORMAL
TIME IN EXERCISE PHASE: NORMAL

## 2018-01-13 NOTE — MISCELLANEOUS
Provider Comments  Provider Comments:   No show for 6/15 appointment        Signatures   Electronically signed by : Carmencita Mohs, OM; Grady 15 2016 11:50AM EST                       (Author)    Electronically signed by : Oksana Opitz, ; Jun 16 2016  5:45PM EST                       (Author)

## 2018-01-14 VITALS
SYSTOLIC BLOOD PRESSURE: 162 MMHG | WEIGHT: 293 LBS | BODY MASS INDEX: 43.4 KG/M2 | HEIGHT: 69 IN | HEART RATE: 84 BPM | DIASTOLIC BLOOD PRESSURE: 82 MMHG

## 2018-01-14 NOTE — RESULT NOTES
Message   A1c 7 5 improved from 8 7 but still not at goal, remind patient to bring in glucose log to upcoming appointment  Increase potassium in diet  Review labs at upcoming appoinment  Verified Results  (1) HEMOGLOBIN A1C 08Sep2016 05:02PM Gina Sprinkle   TW Order Number: [id number]     Test Name Result Flag Reference   HEMOGLOBIN A1C 7 5 % H 4 2-6 3   EST  AVG  GLUCOSE 169 mg/dl       (1) TSH 08Sep2016 05:02PM Gina Sprinkle   TW Order Number: [id number]     Test Name Result Flag Reference   TSH 1 850 uIU/mL  0 358-3 740   Patients undergoing fluorescein dye angiography may retain small amounts of fluorescein in the body for 48-72 hours post procedure  Samples containing fluorescein can produce falsely depressed TSH values  If the patient had this procedure,a specimen should be resubmitted post fluorescein clearance  The recommended reference ranges for TSH during pregnancy are as follows:  First trimester 0 1 to 2 5 uIU/mL  Second trimester  0 2 to 3 0 uIU/mL  Third trimester 0 3 to 3 0 uIU/m     (1) T4, FREE 08Sep2016 05:02PM Gina Sprinkle   TW Order Number: [id number]     Test Name Result Flag Reference   T4,FREE 1 13 ng/dL  0 76-1 46     (1) RENAL FUNCTION PANEL 08Sep2016 05:02PM Gina Sprinkle   TW Order Number: [id number]     Test Name Result Flag Reference   ANION GAP (CALC) 7 mmol/L  4-13   ALBUMIN 3 4 g/dL L 3 5-5 0   BLOOD UREA NITROGEN 20 mg/dL  5-25   National Kidney Disease Education Program recommendations are as follows:  GFR calculation is accurate only with a steady state creatinine  Chronic Kidney disease less than 60 ml/min/1 73 sq  meters  Kidney failure less than 15 ml/min/1 73 sq  meters     CALCIUM 9 3 mg/dL  8 3-10 1   CHLORIDE 105 mmol/L  100-108   CARBON DIOXIDE 28 mmol/L  21-32   CREATININE 0 91 mg/dL  0 60-1 30   Standardized to IDMS reference method   GLUCOSE,RANDM 109 mg/dL     POTASSIUM 3 4 mmol/L L 3 5-5 3   eGFR Non-African American >60 0 ml/min/1 73sq m   SODIUM 140 mmol/L  136-145   PHOSPHORUS 3 1 mg/dL  2 7-4 5     (1) VITAMIN D 25-HYDROXY [id number] 05:02PM Achilles Collar   TW Order Number: [id number]     Test Name Result Flag Reference   VIT D 25-HYDROX 31 8 ng/mL  30 0-100 0   This assay is a certified procedure of the CDC Vitamin D Standardization Certification Program (VDSCP)     Deficiency <20ng/ml   Insufficiency 20-30ng/ml   Sufficient  ng/ml     *Patients undergoing fluorescein dye angiography may retain small amounts of fluorescein in the body for 48-72 hours post procedure  Samples containing fluorescein can produce falsely elevated Vitamin D values  If the patient had this procedure, a specimen should be resubmitted post fluorescein clearance

## 2018-01-15 VITALS
BODY MASS INDEX: 43.4 KG/M2 | SYSTOLIC BLOOD PRESSURE: 144 MMHG | WEIGHT: 293 LBS | DIASTOLIC BLOOD PRESSURE: 82 MMHG | HEIGHT: 69 IN | HEART RATE: 84 BPM

## 2018-01-16 NOTE — RESULT NOTES
Discussion/Summary   appt 9/26     Verified Results  (1) COMPREHENSIVE METABOLIC PANEL [id number] 40:52QP Barbara Pressley Order Number: [id number]     Test Name Result Flag Reference   SODIUM 140 mmol/L  136-145   POTASSIUM 3 3 mmol/L L 3 5-5 3   CHLORIDE 105 mmol/L  100-108   CARBON DIOXIDE 31 mmol/L  21-32   ANION GAP (CALC) 4 mmol/L  4-13   BLOOD UREA NITROGEN 20 mg/dL  5-25   CREATININE 0 89 mg/dL  0 60-1 30   Standardized to IDMS reference method   CALCIUM 8 7 mg/dL  8 3-10 1   BILI, TOTAL 0 78 mg/dL  0 20-1 00   ALK PHOSPHATAS 172 U/L H    ALT (SGPT) 20 U/L  12-78   Specimen collection should occur prior to Sulfasalazine and/or Sulfapyridine administration due to the potential for falsely depressed results  AST(SGOT) 16 U/L  5-45   Specimen collection should occur prior to Sulfasalazine administration due to the potential for falsely depressed results  ALBUMIN 3 0 g/dL L 3 5-5 0   TOTAL PROTEIN 7 3 g/dL  6 4-8 2   eGFR 83 ml/min/1 73sq m     National Kidney Disease Education Program recommendations are as follows:  GFR calculation is accurate only with a steady state creatinine  Chronic Kidney disease less than 60 ml/min/1 73 sq  meters  Kidney failure less than 15 ml/min/1 73 sq  meters  GLUCOSE FASTING 113 mg/dL H 65-99   Specimen collection should occur prior to Sulfasalazine administration due to the potential for falsely depressed results  Specimen collection should occur prior to Sulfapyridine administration due to the potential for falsely elevated results  (1) HEMOGLOBIN A1C [id number] 07:02AM Barbara Pressley Order Number: [id number]     Test Name Result Flag Reference   HEMOGLOBIN A1C 8 2 % H 4 2-6 3   EST  AVG   GLUCOSE 189 mg/dl       (1) MICROALBUMIN CREATININE RATIO, RANDOM URINE 20Sep2017 07:02AM Barbara Pressley Order Number: [id number]     Test Name Result Flag Reference   MICROALBUMIN/ CREAT R 523 mg/g creatinine H 0-30 MICROALBUMIN,URINE 795 0 mg/L H 0 0-20 0   CREATININE URINE 152 0 mg/dL       (1) LIPID PANEL, FASTING [id number] 07:02AM Reyes Lewis Order Number: [id number]     Test Name Result Flag Reference   CHOLESTEROL 140 mg/dL     HDL,DIRECT 46 mg/dL  40-60   Specimen collection should occur prior to Metamizole administration due to the potential for falsley depressed results  LDL CHOLESTEROL CALCULATED 79 mg/dL  0-100   Triglyceride:        Normal <150 mg/dl   Borderline High 150-199 mg/dl   High 200-499 mg/dl   Very High >499 mg/dl      Cholesterol:       Desirable <200 mg/dl    Borderline High 200-239 mg/dl    High >239 mg/dl      HDL Cholesterol:       High>59 mg/dL    Low <41 mg/dL      This screening LDL is a calculated result  It does not have the accuracy of the Direct Measured LDL in the monitoring of patients with hyperlipidemia and/or statin therapy  Direct Measure LDL (JGG922) must be ordered separately in these patients  TRIGLYCERIDES 74 mg/dL  <=150   Specimen collection should occur prior to N-Acetylcysteine or Metamizole administration due to the potential for falsely depressed results

## 2018-01-16 NOTE — RESULT NOTES
Discussion/Summary   has f/u next week     Verified Results  (1) COMPREHENSIVE METABOLIC PANEL [id number] 81:50WL Benny Cruz   TW Order Number: [id number]     Test Name Result Flag Reference   SODIUM 144 mmol/L  136-145   POTASSIUM 3 6 mmol/L  3 5-5 3   CHLORIDE 109 mmol/L H 100-108   CARBON DIOXIDE 29 mmol/L  21-32   ANION GAP (CALC) 6 mmol/L  4-13   BLOOD UREA NITROGEN 16 mg/dL  5-25   CREATININE 0 84 mg/dL  0 60-1 30   Standardized to IDMS reference method   CALCIUM 9 2 mg/dL  8 3-10 1   BILI, TOTAL 0 76 mg/dL  0 20-1 00   ALK PHOSPHATAS 183 U/L H    ALT (SGPT) 24 U/L  12-78   AST(SGOT) 17 U/L  5-45   ALBUMIN 3 3 g/dL L 3 5-5 0   TOTAL PROTEIN 7 7 g/dL  6 4-8 2   eGFR Non-African American      >60 0 ml/min/1 73sq [facility] Disease Education Program recommendations are as follows:  GFR calculation is accurate only with a steady state creatinine  Chronic Kidney disease less than 60 ml/min/1 73 sq  meters  Kidney failure less than 15 ml/min/1 73 sq  meters  GLUCOSE FASTING 157 mg/dL H 65-99     (1) HEMOGLOBIN A1C [id number] 11:42AM [employer]   [employer] Order Number: [id number]     Test Name Result Flag Reference   HEMOGLOBIN A1C 7 0 % H 4 2-6 3   EST  AVG   GLUCOSE 154 mg/dl       (1) MICROALBUMIN CREATININE RATIO, RANDOM URINE [id number] 11:42AM Briana Filter Order Number: [id number]     Test Name Result Flag Reference   MICROALBUMIN/ CREAT R 219 mg/g creatinine H 0-30   MICROALBUMIN,URINE 339 0 mg/L H 0 0-20 0   CREATININE URINE 155 0 mg/dL

## 2018-01-18 NOTE — RESULT NOTES
Message   has upcoming f/u with alexia     Verified Results  (1) COMPREHENSIVE METABOLIC PANEL [id number] 56:77HC Erik Rash     Test Name Result Flag Reference   GLUCOSE,RANDM 133 mg/dL     If the patient is fasting, the ADA then defines impaired fasting glucose as > 100 mg/dL and diabetes as > or equal to 123 mg/dL  SODIUM 140 mmol/L  136-145   POTASSIUM 3 8 mmol/L  3 5-5 3   CHLORIDE 104 mmol/L  100-108   CARBON DIOXIDE 31 mmol/L  21-32   ANION GAP (CALC) 5 mmol/L  4-13   BLOOD UREA NITROGEN 18 mg/dL  5-25   CREATININE 0 86 mg/dL  0 60-1 30   Standardized to IDMS reference method   CALCIUM 9 4 mg/dL  8 3-10 1   BILI, TOTAL 0 51 mg/dL  0 20-1 00   ALK PHOSPHATAS 174 U/L H    ALT (SGPT) 24 U/L  12-78   AST(SGOT) 14 U/L  5-45   ALBUMIN 3 5 g/dL  3 5-5 0   TOTAL PROTEIN 7 6 g/dL  6 4-8 2   eGFR Non-African American      >60 0 ml/min/1 73sq m   [facility] [employer] Disease Education Program recommendations are as follows:  GFR calculation is accurate only with a steady state creatinine  Chronic Kidney disease less than 60 ml/min/1 73 sq  meters  Kidney failure less than 15 ml/min/1 73 sq  meters  (1) LIPID PANEL, FASTING [id number] 11:00AM Erik Kahn     Test Name Result Flag Reference   CHOLESTEROL 140 mg/dL     HDL,DIRECT 43 mg/dL  40-60   Specimen collection should occur prior to Metamizole administration due to the potential for falsely depressed results  LDL CHOLESTEROL CALCULATED 81 mg/dL  0-100   Triglyceride:         Normal              <150 mg/dl       Borderline High    150-199 mg/dl       High               200-499 mg/dl       Very High          >499 mg/dl  Cholesterol:         Desirable        <200 mg/dl      Borderline High  200-239 mg/dl      High             >239 mg/dl  HDL Cholesterol:        High    >59 mg/dL      Low     <41 mg/dL  LDL CALCULATED:    This screening LDL is a calculated result    It does not have the accuracy of the Direct Measured LDL in the monitoring of patients with hyperlipidemia and/or statin therapy  Direct Measure LDL (ABY233) must be ordered separately in these patients  TRIGLYCERIDES 79 mg/dL  <=150   Specimen collection should occur prior to N-Acetylcysteine or Metamizole administration due to the potential for falsely depressed results  (1) HEMOGLOBIN A1C [id number] 11:00AM [employer]     Test Name Result Flag Reference   HEMOGLOBIN A1C 8 7 % H 4 2-6 3   EST  AVG   GLUCOSE 203 mg/dl       (1) MICROALBUMIN CREATININE RATIO, RANDOM URINE 07Jun2016 11:00AM [employer]     Test Name Result Flag Reference   MICROALBUMIN/ CREAT R 97 mg/g creatinine H 0-30   MICROALBUMIN,URINE 175 0 mg/L H 0 0-20 0   CREATININE URINE 181 0 mg/dL       (1) VITAMIN D 25-HYDROXY [id number] 11:00AM [employer]     Test Name Result Flag Reference   VIT D 25-HYDROX 33 0 ng/mL  30 0-100 0

## 2018-01-18 NOTE — MISCELLANEOUS
Provider Comments  Provider Comments:   PATIENT NO SHOWED FOR 5- APPT  Signatures   Electronically signed by :  Marlo Escobar; May 19 2017 10:02AM EST                       (Author)

## 2018-01-19 ENCOUNTER — HOSPITAL ENCOUNTER (OUTPATIENT)
Dept: SLEEP CENTER | Facility: CLINIC | Age: 58
Discharge: HOME | End: 2018-01-19
Payer: MEDICARE

## 2018-01-19 DIAGNOSIS — G47.33 OBSTRUCTIVE SLEEP APNEA SYNDROME: ICD-10-CM

## 2018-01-19 PROCEDURE — G0399 HOME SLEEP TEST/TYPE 3 PORTA: HCPCS

## 2018-01-23 ENCOUNTER — TRANSCRIBE ORDERS (OUTPATIENT)
Dept: SLEEP CENTER | Facility: CLINIC | Age: 58
End: 2018-01-23

## 2018-01-23 VITALS
HEIGHT: 69 IN | WEIGHT: 293 LBS | SYSTOLIC BLOOD PRESSURE: 164 MMHG | HEART RATE: 87 BPM | RESPIRATION RATE: 18 BRPM | BODY MASS INDEX: 43.4 KG/M2 | DIASTOLIC BLOOD PRESSURE: 82 MMHG

## 2018-01-23 VITALS
BODY MASS INDEX: 43.4 KG/M2 | DIASTOLIC BLOOD PRESSURE: 106 MMHG | HEIGHT: 69 IN | WEIGHT: 293 LBS | SYSTOLIC BLOOD PRESSURE: 154 MMHG | HEART RATE: 74 BPM

## 2018-01-23 DIAGNOSIS — G47.33 OSA (OBSTRUCTIVE SLEEP APNEA): Primary | ICD-10-CM

## 2018-01-24 ENCOUNTER — TELEPHONE (OUTPATIENT)
Dept: CARDIOLOGY CLINIC | Facility: CLINIC | Age: 58
End: 2018-01-24

## 2018-01-24 ENCOUNTER — TELEPHONE (OUTPATIENT)
Dept: ENDOCRINOLOGY | Facility: CLINIC | Age: 58
End: 2018-01-24

## 2018-01-24 ENCOUNTER — TELEPHONE (OUTPATIENT)
Dept: SLEEP CENTER | Facility: CLINIC | Age: 58
End: 2018-01-24

## 2018-01-24 NOTE — TELEPHONE ENCOUNTER
Patient is due for follow up she should schedule apt and bring glucose log  Also for the fatigue-- looks like she had a stress test, she should follow up with cardiology about it this, it appears abnormal and can be the cause of her fatigue

## 2018-01-24 NOTE — TELEPHONE ENCOUNTER
Patient called to state she will make an appointment for new patient consult after she follows up with her cardiologist and endocrinologist

## 2018-01-24 NOTE — TELEPHONE ENCOUNTER
Patient called in today and states that she is feeling very exhausted and tired and and falling asleep   Pt states that her sugars have been up and down and that she not always takes her insulin or she just forgets , I asked pt to please send in blood sugar logs

## 2018-01-30 NOTE — TELEPHONE ENCOUNTER
Spoke with Dr Stephie Huang, pt was called, left message  Pt needs follow up office visit  Will have  schedule appt

## 2018-02-06 ENCOUNTER — APPOINTMENT (EMERGENCY)
Dept: CT IMAGING | Facility: HOSPITAL | Age: 58
DRG: 195 | End: 2018-02-06
Payer: MEDICARE

## 2018-02-06 ENCOUNTER — HOSPITAL ENCOUNTER (INPATIENT)
Facility: HOSPITAL | Age: 58
LOS: 2 days | Discharge: HOME | DRG: 195 | End: 2018-02-08
Attending: EMERGENCY MEDICINE | Admitting: HOSPITALIST
Payer: MEDICARE

## 2018-02-06 ENCOUNTER — APPOINTMENT (EMERGENCY)
Dept: RADIOLOGY | Facility: HOSPITAL | Age: 58
DRG: 195 | End: 2018-02-06
Payer: MEDICARE

## 2018-02-06 DIAGNOSIS — R06.00 DYSPNEA ON EXERTION: ICD-10-CM

## 2018-02-06 DIAGNOSIS — J18.9 PNEUMONIA OF RIGHT MIDDLE LOBE DUE TO INFECTIOUS ORGANISM: ICD-10-CM

## 2018-02-06 DIAGNOSIS — J18.9 PNEUMONIA: Primary | ICD-10-CM

## 2018-02-06 PROBLEM — R06.02 SOB (SHORTNESS OF BREATH): Status: ACTIVE | Noted: 2018-02-06

## 2018-02-06 PROBLEM — R79.89 ELEVATED D-DIMER: Status: ACTIVE | Noted: 2018-02-06

## 2018-02-06 LAB
ACETONE SERPL-MCNC: NEGATIVE MG/DL
ALBUMIN SERPL BCP-MCNC: 3.5 G/DL (ref 3.5–5)
ALP SERPL-CCNC: 160 U/L (ref 46–116)
ALT SERPL W P-5'-P-CCNC: 27 U/L (ref 12–78)
ANION GAP SERPL CALCULATED.3IONS-SCNC: 9 MMOL/L (ref 4–13)
AST SERPL W P-5'-P-CCNC: 18 U/L (ref 5–45)
ATRIAL RATE: 92 BPM
BASOPHILS # BLD AUTO: 0.05 THOUSANDS/ΜL (ref 0–0.1)
BASOPHILS NFR BLD AUTO: 1 % (ref 0–1)
BILIRUB SERPL-MCNC: 0.55 MG/DL (ref 0.2–1)
BUN SERPL-MCNC: 24 MG/DL (ref 5–25)
CALCIUM SERPL-MCNC: 9.5 MG/DL (ref 8.3–10.1)
CHLORIDE SERPL-SCNC: 97 MMOL/L (ref 100–108)
CO2 SERPL-SCNC: 29 MMOL/L (ref 21–32)
CREAT SERPL-MCNC: 1.16 MG/DL (ref 0.6–1.3)
DEPRECATED D DIMER PPP: 3145 NG/ML (FEU) (ref 0–424)
EOSINOPHIL # BLD AUTO: 0.19 THOUSAND/ΜL (ref 0–0.61)
EOSINOPHIL NFR BLD AUTO: 2 % (ref 0–6)
ERYTHROCYTE [DISTWIDTH] IN BLOOD BY AUTOMATED COUNT: 16.1 % (ref 11.6–15.1)
FLUAV AG SPEC QL: NORMAL
FLUBV AG SPEC QL: NORMAL
GFR SERPL CREATININE-BSD FRML MDRD: 60 ML/MIN/1.73SQ M
GLUCOSE SERPL-MCNC: 219 MG/DL (ref 65–140)
GLUCOSE SERPL-MCNC: 232 MG/DL (ref 65–140)
GLUCOSE SERPL-MCNC: 233 MG/DL (ref 65–140)
GLUCOSE SERPL-MCNC: 259 MG/DL (ref 65–140)
GLUCOSE SERPL-MCNC: 343 MG/DL (ref 65–140)
HCT VFR BLD AUTO: 40.7 % (ref 34.8–46.1)
HGB BLD-MCNC: 13 G/DL (ref 11.5–15.4)
LIPASE SERPL-CCNC: 100 U/L (ref 73–393)
LYMPHOCYTES # BLD AUTO: 2.71 THOUSANDS/ΜL (ref 0.6–4.47)
LYMPHOCYTES NFR BLD AUTO: 35 % (ref 14–44)
MCH RBC QN AUTO: 24.9 PG (ref 26.8–34.3)
MCHC RBC AUTO-ENTMCNC: 31.9 G/DL (ref 31.4–37.4)
MCV RBC AUTO: 78 FL (ref 82–98)
MONOCYTES # BLD AUTO: 0.35 THOUSAND/ΜL (ref 0.17–1.22)
MONOCYTES NFR BLD AUTO: 5 % (ref 4–12)
NEUTROPHILS # BLD AUTO: 4.48 THOUSANDS/ΜL (ref 1.85–7.62)
NEUTS SEG NFR BLD AUTO: 57 % (ref 43–75)
NRBC BLD AUTO-RTO: 0 /100 WBCS
P AXIS: 61 DEGREES
PLATELET # BLD AUTO: 289 THOUSANDS/UL (ref 149–390)
PMV BLD AUTO: 11 FL (ref 8.9–12.7)
POTASSIUM SERPL-SCNC: 3.7 MMOL/L (ref 3.5–5.3)
PR INTERVAL: 176 MS
PROT SERPL-MCNC: 8.2 G/DL (ref 6.4–8.2)
QRS AXIS: 29 DEGREES
QRSD INTERVAL: 98 MS
QT INTERVAL: 370 MS
QTC INTERVAL: 457 MS
RBC # BLD AUTO: 5.23 MILLION/UL (ref 3.81–5.12)
RSV B RNA SPEC QL NAA+PROBE: NORMAL
SODIUM SERPL-SCNC: 135 MMOL/L (ref 136–145)
T WAVE AXIS: 46 DEGREES
VENTRICULAR RATE: 92 BPM
WBC # BLD AUTO: 7.78 THOUSAND/UL (ref 4.31–10.16)

## 2018-02-06 PROCEDURE — 85379 FIBRIN DEGRADATION QUANT: CPT | Performed by: EMERGENCY MEDICINE

## 2018-02-06 PROCEDURE — 99285 EMERGENCY DEPT VISIT HI MDM: CPT

## 2018-02-06 PROCEDURE — 36415 COLL VENOUS BLD VENIPUNCTURE: CPT | Performed by: PODIATRIST

## 2018-02-06 PROCEDURE — 96374 THER/PROPH/DIAG INJ IV PUSH: CPT

## 2018-02-06 PROCEDURE — 82948 REAGENT STRIP/BLOOD GLUCOSE: CPT

## 2018-02-06 PROCEDURE — 85025 COMPLETE CBC W/AUTO DIFF WBC: CPT | Performed by: PODIATRIST

## 2018-02-06 PROCEDURE — 83690 ASSAY OF LIPASE: CPT | Performed by: PODIATRIST

## 2018-02-06 PROCEDURE — 80053 COMPREHEN METABOLIC PANEL: CPT | Performed by: PODIATRIST

## 2018-02-06 PROCEDURE — 71046 X-RAY EXAM CHEST 2 VIEWS: CPT

## 2018-02-06 PROCEDURE — 99223 1ST HOSP IP/OBS HIGH 75: CPT | Performed by: PHYSICIAN ASSISTANT

## 2018-02-06 PROCEDURE — 96361 HYDRATE IV INFUSION ADD-ON: CPT

## 2018-02-06 PROCEDURE — 82009 KETONE BODYS QUAL: CPT | Performed by: PODIATRIST

## 2018-02-06 PROCEDURE — 94640 AIRWAY INHALATION TREATMENT: CPT

## 2018-02-06 PROCEDURE — 93005 ELECTROCARDIOGRAM TRACING: CPT

## 2018-02-06 PROCEDURE — 94760 N-INVAS EAR/PLS OXIMETRY 1: CPT

## 2018-02-06 PROCEDURE — 87449 NOS EACH ORGANISM AG IA: CPT | Performed by: PHYSICIAN ASSISTANT

## 2018-02-06 PROCEDURE — 93010 ELECTROCARDIOGRAM REPORT: CPT | Performed by: INTERNAL MEDICINE

## 2018-02-06 PROCEDURE — 71275 CT ANGIOGRAPHY CHEST: CPT

## 2018-02-06 PROCEDURE — 87798 DETECT AGENT NOS DNA AMP: CPT | Performed by: PODIATRIST

## 2018-02-06 RX ORDER — 0.9 % SODIUM CHLORIDE 0.9 %
3 VIAL (ML) INJECTION AS NEEDED
Status: DISCONTINUED | OUTPATIENT
Start: 2018-02-06 | End: 2018-02-08 | Stop reason: HOSPADM

## 2018-02-06 RX ORDER — LEVALBUTEROL 1.25 MG/.5ML
1.25 SOLUTION, CONCENTRATE RESPIRATORY (INHALATION)
Status: DISCONTINUED | OUTPATIENT
Start: 2018-02-06 | End: 2018-02-08 | Stop reason: HOSPADM

## 2018-02-06 RX ORDER — BENZONATATE 100 MG/1
100 CAPSULE ORAL 3 TIMES DAILY PRN
Status: DISCONTINUED | OUTPATIENT
Start: 2018-02-06 | End: 2018-02-08 | Stop reason: HOSPADM

## 2018-02-06 RX ORDER — HEPARIN SODIUM 5000 [USP'U]/ML
5000 INJECTION, SOLUTION INTRAVENOUS; SUBCUTANEOUS EVERY 8 HOURS SCHEDULED
Status: DISCONTINUED | OUTPATIENT
Start: 2018-02-06 | End: 2018-02-08 | Stop reason: HOSPADM

## 2018-02-06 RX ORDER — INSULIN GLARGINE 100 [IU]/ML
35 INJECTION, SOLUTION SUBCUTANEOUS
Status: DISCONTINUED | OUTPATIENT
Start: 2018-02-06 | End: 2018-02-07

## 2018-02-06 RX ORDER — ASPIRIN 81 MG/1
81 TABLET, CHEWABLE ORAL DAILY
Status: DISCONTINUED | OUTPATIENT
Start: 2018-02-07 | End: 2018-02-08 | Stop reason: HOSPADM

## 2018-02-06 RX ORDER — ACETAMINOPHEN 325 MG/1
650 TABLET ORAL EVERY 6 HOURS PRN
Status: DISCONTINUED | OUTPATIENT
Start: 2018-02-06 | End: 2018-02-08 | Stop reason: HOSPADM

## 2018-02-06 RX ORDER — ONDANSETRON 2 MG/ML
4 INJECTION INTRAMUSCULAR; INTRAVENOUS EVERY 6 HOURS PRN
Status: DISCONTINUED | OUTPATIENT
Start: 2018-02-06 | End: 2018-02-08 | Stop reason: HOSPADM

## 2018-02-06 RX ORDER — LABETALOL 200 MG/1
200 TABLET, FILM COATED ORAL 2 TIMES DAILY
Status: DISCONTINUED | OUTPATIENT
Start: 2018-02-06 | End: 2018-02-08 | Stop reason: HOSPADM

## 2018-02-06 RX ORDER — AMLODIPINE BESYLATE 10 MG/1
10 TABLET ORAL DAILY
Status: DISCONTINUED | OUTPATIENT
Start: 2018-02-07 | End: 2018-02-08 | Stop reason: HOSPADM

## 2018-02-06 RX ORDER — BENZONATATE 100 MG/1
100 CAPSULE ORAL 3 TIMES DAILY PRN
COMMUNITY
End: 2018-06-24

## 2018-02-06 RX ORDER — ATORVASTATIN CALCIUM 40 MG/1
40 TABLET, FILM COATED ORAL
Status: DISCONTINUED | OUTPATIENT
Start: 2018-02-07 | End: 2018-02-08 | Stop reason: HOSPADM

## 2018-02-06 RX ORDER — SODIUM CHLORIDE FOR INHALATION 0.9 %
3 VIAL, NEBULIZER (ML) INHALATION
Status: DISCONTINUED | OUTPATIENT
Start: 2018-02-06 | End: 2018-02-08 | Stop reason: HOSPADM

## 2018-02-06 RX ADMIN — ISODIUM CHLORIDE 3 ML: 0.03 SOLUTION RESPIRATORY (INHALATION) at 20:38

## 2018-02-06 RX ADMIN — INSULIN GLARGINE 35 UNITS: 100 INJECTION, SOLUTION SUBCUTANEOUS at 22:02

## 2018-02-06 RX ADMIN — HEPARIN SODIUM 5000 UNITS: 5000 INJECTION, SOLUTION INTRAVENOUS; SUBCUTANEOUS at 22:03

## 2018-02-06 RX ADMIN — CEFEPIME HYDROCHLORIDE 2000 MG: 2 INJECTION, POWDER, FOR SOLUTION INTRAVENOUS at 15:14

## 2018-02-06 RX ADMIN — LEVALBUTEROL HYDROCHLORIDE 1.25 MG: 1.25 SOLUTION, CONCENTRATE RESPIRATORY (INHALATION) at 20:38

## 2018-02-06 RX ADMIN — IOHEXOL 85 ML: 350 INJECTION, SOLUTION INTRAVENOUS at 14:12

## 2018-02-06 RX ADMIN — LABETALOL HYDROCHLORIDE 200 MG: 200 TABLET, FILM COATED ORAL at 20:48

## 2018-02-06 RX ADMIN — AZITHROMYCIN MONOHYDRATE 500 MG: 500 INJECTION, POWDER, LYOPHILIZED, FOR SOLUTION INTRAVENOUS at 15:54

## 2018-02-06 RX ADMIN — INSULIN LISPRO 2 UNITS: 100 INJECTION, SOLUTION INTRAVENOUS; SUBCUTANEOUS at 22:03

## 2018-02-06 RX ADMIN — SODIUM CHLORIDE 1000 ML: 0.9 INJECTION, SOLUTION INTRAVENOUS at 12:06

## 2018-02-06 NOTE — ED ATTENDING ATTESTATION
Luzmaria Browning MD, saw and evaluated the patient  I have discussed the patient with the resident/non-physician practitioner and agree with the resident's/non-physician practitioner's findings, Plan of Care, and MDM as documented in the resident's/non-physician practitioner's note, except where noted  All available labs and Radiology studies were reviewed  At this point I agree with the current assessment done in the Emergency Department  I have conducted an independent evaluation of this patient a history and physical is as follows:      59year old female presents for evaluation of shortness of breath which started earlier today  She states that associated with productive cough which started several days ago  She was seen in urgent care center and diagnosed with a URI was negative for the flu  She states that she has been having worsening shortness of breath and cough over the past day  Patient reports elevated blood sugar and blood pressure  Ten systems reviewed otherwise negative  On exam no acute distress, HEENT normal, lungs normal, cardiac normal, abdomen normal, 1+ lower extremity edema   Medical decision making;-chest pain, cough, shortness of breath, hypertension-will do chest x-ray, EKG, cardiac labs, D-dimer, nebs, reassess    Critical Care Time  CritCare Time    Procedures

## 2018-02-06 NOTE — ED NOTES
Katt Díaz with [facility] paged for sliding scale insulin orders to be placed       Norma Griffith RN  02/06/18 [id number]

## 2018-02-06 NOTE — ED NOTES
Send patient's home medication down to pharmacy at this time  Bag # [id number]     Shital Montero RN  02/06/18 8904

## 2018-02-06 NOTE — H&P
History and Physical - [facility] Internal Medicine    Patient Information: Ann-Marie Martins 59 y o  female MRN: [id number]  Unit/Bed#: ED 17 Encounter: [id number]  Admitting Physician: Dianna Randolph PA-C  PCP: Mathew Chan DO  Date of Admission:  02/06/18    Assessment/Plan:    Hospital Problem List:     Principal Problem:    Pneumonia  Active Problems:    Diabetes mellitus ([address] )    Hypertension    SOB (shortness of breath)    Elevated d-dimer      Primary Problem(s):  · Multi lobe pneumonia   · CT of chest revealed middle lobe infiltrative changes and mild ground breast opacity in the left lower lobe posterior laterally  · Fortunately afebrile without a white count  · Rule out flu by PCR  · Obtain urine antigens strep pneumonia and Legionella  · Started on cefepime and azithromycin in ED  Which will be continued given recent exposure to hospital after bring her  to the hospital and was with him while he was being admitted  · Supple oxygen, supportive care, Mucinex, Xopenex nebulizers  Additional Problems:   · Shortness of breath:  Most likely relation to pneumonia  ER obtain right ear dimer which was elevated in the 3000  Subsequently a CT of the chest was done to rule out PE which was negative for this  But did reveal right middle lobe infiltrative changes, central lobar nodules and tree-in-bud opacity  Findings may represent mild pneumonia and small airways disease  Mild ground-glass opacity in the left lower lobe posterior laterally may be related to atelectasis or minimal infiltrate    · Hyponatremia :  Sodium 135  Will continue to monitor  · Type 2 diabetes: On metformin   Hold here and placed on insulin sliding scale  Continue Lantus 35 units before bed, and Humalog before meals  Here with hyperglycemia  Glucose in the 300s  · Essential hypertension:  On labetalol 200 mg twice daily, amlodipine 10 mg daily, and valsartan/hydrochlorothiazide 320/25 mg daily    Initially with elevated blood pressures in the 385W to 518 systolic we  However since, blood pressure has trended down without intervention and is now in the 444 systolic we  Will continue to monitor  Add 5 mg of hydralazine for SBP greater than 160  · Hyperlipidemia:  Continue statin    · Incidental finding # 1:  Pulmonary nodules:  CT of chest reveals a few pulmonary nodules bilaterally measuring up to 4 mm  VTE Prophylaxis: Heparin  / sequential compression device   Code Status:  Full code  Anticipated Length of Stay:  Patient will be admitted on an Inpatient basis with an anticipated length of stay of  greater than 2 midnights  Justification for Hospital Stay:  Pneumonia with need for IV antibiotics    Chief Complaint:   Cough / shortness of breath    History of Present Illness:    Avis Jacobo is a 59 y o  female with past medical history of essential hypertension, hyperlipidemia, type 2 diabetes  She presents with cough and shortness of breath that initially started 1 week ago  Patient states she also had nasal congestion with increased mucus production  She has been bringing up a white thick mucus with her cough  She admits to weakness/tiredness, decreased energy, and poor appetite  Also with some palpitations and some pressure on her chest   Denies any vomiting, abdominal pain, or diarrhea  She did recently have healthcare closure when she brought her  to the emergency room for a problem with his foot and he ended up getting admitted  Review of Systems:    General:   No Fever or chills;    EENT:   No sneezing, sore throat  Skin:   No rashes, color changes  Respiratory:     + shortness of breath, cough, wheezing,   Cardiovascular:     No chest pain, palpitations  Gastrointestinal:    No nausea, vomiting, diarrhea; No abdominal pain  Musculoskeletal:     No arthralgias, myalgias,   Neurologic:   No dizziness, numbness, weakness  No speech difficulties     Psych:   No agitation, Otherwise, All other twelve-point review of systems normal      Past Medical and Surgical History:     Past Medical History:   Diagnosis Date    Diabetes mellitus ([facility] )     Hypertension     Seizures ([facility] )        Past Surgical History:   Procedure Laterality Date    HYSTERECTOMY         Meds/Allergies:    Current Facility-Administered Medications   Medication Dose Route Frequency Provider Last Rate Last Dose    sodium chloride (PF) 0 9 % injection 3 mL  3 mL Intravenous PRN Cassius Vang         Current Outpatient Prescriptions   Medication Sig Dispense Refill    amLODIPine (NORVASC) 10 mg tablet Take 1 tablet by mouth daily      aspirin 81 MG tablet Take 1 tablet by mouth daily      atorvastatin (LIPITOR) 40 mg tablet Take 40 mg by mouth daily        SARAH MICROLET LANCETS lancets Inject 1 applicator into the skin 4 (four) times a day      benzonatate (TESSALON PERLES) 100 mg capsule Take 100 mg by mouth 3 (three) times a day as needed for cough      Cholecalciferol (VITAMIN D3) 2000 units capsule Take by mouth      glucose blood ([employer] CONTOUR NEXT TEST) test strip 1 applicator by In Vitro route 4 (four) times a day      Insulin Glargine (LANTUS SOLOSTAR) 100 UNIT/ML SOPN Inject 35 Units under the skin daily at bedtime        insulin lispro (HUMALOG KWIKPEN) 100 Units/mL SOPN Inject under the skin 3 (three) times a day with meals        Insulin Pen Needle (BD PEN NEEDLE DERRICK U/F) 32G X 4 MM MISC Inject 1 applicator under the skin 4 (four) times a day      labetalol (NORMODYNE) 200 mg tablet Take 200 mg by mouth 2 (two) times a day      metFORMIN (GLUCOPHAGE) 500 mg tablet Take 500 mg by mouth 2 (two) times a day with meals        valsartan-hydrochlorothiazide (DIOVAN-HCT) 320-25 MG per tablet Take 1 tablet by mouth daily         Allergies   Allergen Reactions    Aspirin        Allergies:    Allergies   Allergen Reactions    Aspirin        Social History:     Marital Status: /Civil Union Substance Use History:   History   Alcohol Use No     History   Smoking Status    Never Smoker   Smokeless Tobacco    Never Used     History   Drug Use No       Family History:    non-contributory    Physical Exam:     Vitals:   Blood Pressure: 153/89 (02/06/18 1615)  Pulse: 74 (02/06/18 1615)  Temperature: 97 9 °F (36 6 °C) (02/06/18 1124)  Temp Source: Oral (02/06/18 1124)  Respirations: 16 (02/06/18 1615)  SpO2: 98 % (02/06/18 1615)    Physical Exam    Additional Data:     Lab Results: I have personally reviewed pertinent reports  Results from last 7 days  Lab Units 02/06/18  1206   WBC Thousand/uL 7 78   HEMOGLOBIN g/dL 13 0   HEMATOCRIT % 40 7   PLATELETS Thousands/uL 289   NEUTROS PCT % 57   LYMPHS PCT % 35   MONOS PCT % 5   EOS PCT % 2       Results from last 7 days  Lab Units 02/06/18  1206   SODIUM mmol/L 135*   POTASSIUM mmol/L 3 7   CHLORIDE mmol/L 97*   CO2 mmol/L 29   BUN mg/dL 24   CREATININE mg/dL 1 16   CALCIUM mg/dL 9 5   TOTAL PROTEIN g/dL 8 2   BILIRUBIN TOTAL mg/dL 0 55   ALK PHOS U/L 160*   ALT U/L 27   AST U/L 18   GLUCOSE RANDOM mg/dL 343*           Imaging: I have personally reviewed pertinent reports  X-ray Chest 2 Views    Result Date: 2/6/2018  Narrative: CHEST 2 View INDICATION:  Upper respiratory infection with cough and congestion for a week  COMPARISON:  10/20/2017  VIEWS:  PA and lateral projections; 2 images FINDINGS: Cardiomediastinal silhouette appears stable  The lungs are clear  No pneumothorax or pleural effusion  Visualized osseous structures appear within normal limits for the patient's age  Impression: No active pulmonary disease  Workstation performed: [id number]     Nm Procedure (historical)    Result Date: 1/11/2018  Narrative: [address] [address]    [place], [address]  [phone]  Rest/Stress Gated SPECT Myocardial Perfusion Imaging After Regadenoson  Patient: Néstor Hurtado  MR number: [id number] Account number: [de-identified]  : 1960  Age: 59 years  Gender: Female  Status: Outpatient  Location: Stress lab  Height: 68 in  Weight: 298 lb  BP: 141/ 90 mmHg  Allergies: ASPIRIN  Diagnosis: R06 00 - Dyspnea, unspecified  Primary Physician:  Mandy Yung DO  Technician:  Rock Stubbs  RN:  Celsa Green RN BSN  Referring Physician:  Stoney Barahona MD  Group:  [facility]  Report Prepared By[de-identified]  Celsa Green RN BSN  Interpreting Physician:  Vasyl Ross MD  INDICATIONS: Detection of Coronary artery disease  HISTORY: The patient is a 59year old  female  Chest pain status: no chest pain  Other symptoms: dyspnea and decreased effort tolerance  Coronary artery disease risk factors: dyslipidemia, hypertension, family history of  premature coronary artery disease, diabetes mellitus, and post-menopausal state  Cardiovascular history: none significant  Co-morbidity: obesity  Medications: a beta blocker, a calcium channel blocker, an ACE inhibitor/ARB, a diuretic,  aspirin, a lipid lowering agent, and diabetic medications  PHYSICAL EXAM: Baseline physical exam screening: no wheezes audible  REST ECG: Normal sinus rhythm  PROCEDURE: The study was performed in the stress lab  A regadenoson infusion pharmacologic stress test was performed  Gated SPECT myocardial perfusion imaging was performed after stress and at rest  Systolic blood pressure was 141 mmHg, at  the start of the study  Diastolic blood pressure was 90 mmHg, at the start of the study  The heart rate was 73 bpm, at the start of the study  IV double checked  Regadenoson protocol:  HR bpm SBP mmHg DBP mmHg Symptoms  Baseline 73 141 90 none  Immediate 118 170 88 moderate dyspnea, fatigue, nausea  3 min 91 205 91 headache  5 min 88 160 90 subsiding  No medications or fluids given  The patient also performed low level exercise  STRESS SUMMARY: Duration of pharmacologic stress was 3 min   Functional capacity could not be adequately assessed  Maximal heart rate during stress was 118 bpm  The heart rate response to stress was normal  There was resting hypertension  with an appropriate blood pressure response to vasodilator stress  The rate-pressure product for the peak heart rate and blood pressure was 33516  There was no chest pain during stress  The stress test was terminated due to protocol  completion  Pre oxygen saturation: 98 %  Peak oxygen saturation: 100 %  The stress ECG was normal  Arrhythmia during stress: isolated premature ventricular beats  ISOTOPE ADMINISTRATION:  Resting isotope administration Stress isotope administration  Agent Tetrofosmin Tetrofosmin  Dose 16 5 mCi 46 5 mCi  Date 01/11/2018 01/11/2018  Injection time 08:23 10:15  Imaging time 09:03 11:05  Injection-image interval 40 min 50 min  The radiopharmaceutical was injected at the peak effect of pharmacologic stress  MYOCARDIAL PERFUSION IMAGING:  The image quality was good  Left ventricular size was normal  The TID ratio was 1 17  PERFUSION DEFECTS:  -  There was a moderate-sized, mildly severe, reversible myocardial perfusion defect of the mid-apical anterior wall  GATED SPECT:  The calculated left ventricular ejection fraction was 38 %  Left ventricular ejection fraction was mildly decreased by visual estimate  There was mildly reduced myocardial thickening and motion of the anterior wall of the left ventricle  SUMMARY:  -  Stress results: There was resting hypertension with an appropriate blood pressure response to vasodilator stress  There was no chest pain during stress  -  ECG conclusions: The stress ECG was normal   -  Perfusion imaging: There was a moderate-sized, mildly severe, reversible myocardial perfusion defect of the mid-apical anterior wall  -  Gated SPECT: The calculated left ventricular ejection fraction was 38 %  Left ventricular ejection fraction was mildly decreased by visual estimate   There was mildly reduced myocardial thickening and motion of the anterior wall of the  left ventricle  IMPRESSIONS: Abnormal study after vasodilation and low level exercise  There was a moderate amount of ischemia in the anterior region  Left ventricular systolic function was reduced, with regional wall motion abnormalities  Prepared and signed by  Mando Martinez MD  Signed 01/11/2018 15:23:40    Cta Ed Chest Pe Study    Result Date: 2/6/2018  Narrative: CTA - CHEST WITH IV CONTRAST - PULMONARY ANGIOGRAM INDICATION: Chest pain, acute, PE suspected, intermed prob, positive D-dimer  History taken directly from the electronic ordering system  COMPARISON: Chest x-ray 2/6/2018 TECHNIQUE: CTA examination of the chest was performed using angiographic technique according to a protocol specifically tailored to evaluate for pulmonary embolism  Reformatted images were created in axial, sagittal, and coronal planes  In addition, coronal 3D MIP postprocessing was performed on the acquisition scanner  Radiation dose length product (DLP) for this visit:  507 mGy-cm   This examination, like all CT scans performed in the [facility], was performed utilizing techniques to minimize radiation dose exposure, including the use of iterative reconstruction and automated exposure control  IV Contrast:  85 mL of iohexol (OMNIPAQUE)  FINDINGS: PULMONARY ARTERIAL TREE:  No pulmonary embolus is seen  Prominent main pulmonary artery at 3 5 cm in diameter suggests pulmonary artery hypertension  LUNGS:  3 mm subpleural nodule in the left upper lobe anteriorly, image 27 series 3   4 mm nodule in the right middle lobe anteriorly image 35 series 3  Adjacent mild infiltrative changes, centrilobular nodules and tree-in-bud opacity  Findings may represent mild pneumonia and small airways disease  Mild groundglass opacity in the left lower lobe posterolaterally may be related to atelectasis or minimal infiltrate  PLEURA:  Unremarkable   HEART/AORTA:  The ascending thoracic aorta is mildly aneurysmal measuring up to 4 3 cm in diameter  MEDIASTINUM AND CRISTAL:  Unremarkable  CHEST WALL AND LOWER NECK: Normal  VISUALIZED STRUCTURES IN THE UPPER ABDOMEN:  Unremarkable  OSSEOUS STRUCTURES:  No acute fracture or destructive osseous lesion  Impression: 1  No evidence of pulmonary embolism  2   Right middle lobe mild infiltrative changes, centrilobular nodules and tree-in-bud opacity  Findings may represent mild pneumonia and small airways disease  Mild groundglass opacity in the left lower lobe posterolaterally may be related to atelectasis or minimal infiltrate  3   A few pulmonary nodules bilaterally measuring up to 4 mm  Based on current Fleischner Society 2017 Guidelines on incidental pulmonary nodule, no routine follow-up is needed if the patient is considered low risk for lung cancer  If the patient is considered high risk for lung cancer, 12 month follow-up non-contrast chest CT is recommended  Workstation performed: [id number]       EKG, Pathology, and Other Studies Reviewed on Admission:   · EK  normal sinus rhythm    Allscripts Records Reviewed: Yes     Total Time for Visit, including Counseling / Coordination of Care: 45 minutes  Greater than 50% of this total time spent on direct patient counseling and coordination of care  ** Please Note: This note has been constructed using a voice recognition system   **

## 2018-02-06 NOTE — ED PROVIDER NOTES
History  Chief Complaint   Patient presents with    Shortness of Breath     pt comes in with c/o SOB and cough that started few days ago  seen at urgent care (-)flu dx with URI  pt at work started to feel weak and increased productive cough  was also noted pt had blood pressure in 200's and blood sugar >400  pt has hx htn and dm  Pt presents for evaluation of shortness of breath  States that she was seen here and diagnosed with Bronchitis, there are no records indicating this  States that she has been compliant with the treatment  States that since being seen, her symptoms have gotten worse  Denies fever, N,V  States that she does take her blood pressure medications regularly  She is also compliant with her DM medications and states that normally her blood sugar is well controlled, however, she could not give me a value  She believes that they are uncontrolled due to her URI  She is complaining of chest tightness and had some difficulty speaking this morning there was "a blockage" in her throat  States that she is feeling SOB when she tries to go to sleep  Produces clear sputum with cough            Prior to Admission Medications   Prescriptions Last Dose Informant Patient Reported? Taking?    SARAH MICROLET LANCETS lancets   Yes No   Sig: Inject 1 applicator into the skin 4 (four) times a day   Cholecalciferol (VITAMIN D3) 2000 units capsule   Yes No   Sig: Take by mouth   Insulin Glargine (LANTUS SOLOSTAR) 100 UNIT/ML SOPN   Yes No   Sig: Inject 30 Units under the skin Medrol Dose Pack scheduling ONLY     Insulin Pen Needle (BD PEN NEEDLE DERRICK U/F) 32G X 4 MM MISC   Yes No   Sig: Inject 1 applicator under the skin 4 (four) times a day   amLODIPine (NORVASC) 10 mg tablet   Yes No   Sig: Take 1 tablet by mouth daily   aspirin 81 MG tablet   Yes No   Sig: Take 1 tablet by mouth daily   atorvastatin (LIPITOR) 40 mg tablet   Yes No   Sig: Take by mouth   glucose blood ([employer] CONTOUR NEXT TEST) test strip   Yes No Si applicator by In Vitro route 4 (four) times a day   insulin lispro (HUMALOG KWIKPEN) 100 Units/mL SOPN   Yes No   Sig: Inject under the skin 6 units with breakfast, 6 units with lunch and 14 units with dinner    labetalol (NORMODYNE) 200 mg tablet   Yes No   Sig: Take 200 mg by mouth 2 (two) times a day   metFORMIN (GLUCOPHAGE) 500 mg tablet   Yes No   Sig: Take 500 mg by mouth 2 (two) times a day with meals     promethazine-codeine (PHENERGAN WITH CODEINE) 6 25-10 mg/5 mL syrup   No No   Sig: Take 5 mL by mouth every 4 (four) hours as needed for cough   spironolactone (ALDACTONE) 25 mg tablet   Yes No   Sig: Take 1 tablet by mouth daily   valsartan-hydrochlorothiazide (DIOVAN-HCT) 320-25 MG per tablet   Yes No   Sig: Take 1 tablet by mouth daily      Facility-Administered Medications: None       Past Medical History:   Diagnosis Date    Diabetes mellitus ([facility] )     Hypertension     Seizures ([facility] )        Past Surgical History:   Procedure Laterality Date    HYSTERECTOMY         History reviewed  No pertinent family history  I have reviewed and agree with the history as documented  Social History   Substance Use Topics    Smoking status: Never Smoker    Smokeless tobacco: Never Used    Alcohol use No        Review of Systems   Constitutional: Negative for fever  Respiratory: Positive for cough, chest tightness and shortness of breath  All other systems reviewed and are negative  Physical Exam  ED Triage Vitals [18 1124]   Temperature Pulse Respirations Blood Pressure SpO2   97 9 °F (36 6 °C) 93 22 (!) 220/107 99 %      Temp Source Heart Rate Source Patient Position - Orthostatic VS BP Location FiO2 (%)   Oral Monitor Lying Right arm --      Pain Score       No Pain           Orthostatic Vital Signs  Vitals:    18 1124   BP: (!) 220/107   Pulse: 93   Patient Position - Orthostatic VS: Lying       Physical Exam   Constitutional: She is oriented to person, place, and time   She appears well-developed and well-nourished  HENT:   Head: Normocephalic and atraumatic  Eyes: Pupils are equal, round, and reactive to light  Neck: Normal range of motion  Cardiovascular: Normal rate and regular rhythm  Pulmonary/Chest: Effort normal    Abdominal: Soft  Musculoskeletal: Normal range of motion  Neurological: She is alert and oriented to person, place, and time  Skin: Skin is warm and dry  Psychiatric: She has a normal mood and affect  Her behavior is normal        ED Medications  Medications - No data to display    Diagnostic Studies  Results Reviewed     Procedure Component Value Units Date/Time    Fingerstick Glucose (POCT) [[id number]]  (Abnormal) Collected:  02/06/18 1116    Lab Status:  Final result Updated:  02/06/18 1117     POC Glucose 233 (H) mg/dl                  No orders to display         Procedures  Procedures      Phone Consults  ED Phone Contact    ED Course  ED Course as of Feb 06 1501   Tue Feb 06, 2018   1405 CTA ED chest PE study                               MDM  Number of Diagnoses or Management Options  Diagnosis management comments: 1  Hypertension  - states that she is compliant with meds at home  2  Hyperglycemia  - possibly related to URI  - pt states that she is complaint with meds at home  - Will check basic labs and acetone  3  Dyspnea  - Will check D-dimer, elevated  - CT ordered which was negative for PE, but pt does have infiltrates that could be suggestive of mild pneumonia  - Will admit to [facility] for pneumonia with symptomatic SOB on exertion    CritCare Time    Disposition  Final diagnoses:   None     ED Disposition     None      Follow-up Information    None       Patient's Medications   Discharge Prescriptions    No medications on file     No discharge procedures on file  ED Provider  Attending physically available and evaluated Avis Jacobo I managed the patient along with the ED Attending      Electronically Signed by         Bharathi Long Kumar  02/06/18 1507

## 2018-02-07 LAB
ALBUMIN SERPL BCP-MCNC: 2.9 G/DL (ref 3.5–5)
ALP SERPL-CCNC: 129 U/L (ref 46–116)
ALT SERPL W P-5'-P-CCNC: 23 U/L (ref 12–78)
ANION GAP SERPL CALCULATED.3IONS-SCNC: 7 MMOL/L (ref 4–13)
AST SERPL W P-5'-P-CCNC: 17 U/L (ref 5–45)
BASOPHILS # BLD AUTO: 0.03 THOUSANDS/ΜL (ref 0–0.1)
BASOPHILS NFR BLD AUTO: 0 % (ref 0–1)
BILIRUB SERPL-MCNC: 0.71 MG/DL (ref 0.2–1)
BUN SERPL-MCNC: 21 MG/DL (ref 5–25)
CALCIUM SERPL-MCNC: 8.6 MG/DL (ref 8.3–10.1)
CHLORIDE SERPL-SCNC: 103 MMOL/L (ref 100–108)
CO2 SERPL-SCNC: 29 MMOL/L (ref 21–32)
CREAT SERPL-MCNC: 0.96 MG/DL (ref 0.6–1.3)
EOSINOPHIL # BLD AUTO: 0.3 THOUSAND/ΜL (ref 0–0.61)
EOSINOPHIL NFR BLD AUTO: 4 % (ref 0–6)
ERYTHROCYTE [DISTWIDTH] IN BLOOD BY AUTOMATED COUNT: 16.7 % (ref 11.6–15.1)
GFR SERPL CREATININE-BSD FRML MDRD: 75 ML/MIN/1.73SQ M
GLUCOSE SERPL-MCNC: 191 MG/DL (ref 65–140)
GLUCOSE SERPL-MCNC: 225 MG/DL (ref 65–140)
GLUCOSE SERPL-MCNC: 235 MG/DL (ref 65–140)
GLUCOSE SERPL-MCNC: 286 MG/DL (ref 65–140)
GLUCOSE SERPL-MCNC: 301 MG/DL (ref 65–140)
HCT VFR BLD AUTO: 34.9 % (ref 34.8–46.1)
HGB BLD-MCNC: 11 G/DL (ref 11.5–15.4)
L PNEUMO1 AG UR QL IA.RAPID: NEGATIVE
LYMPHOCYTES # BLD AUTO: 2.61 THOUSANDS/ΜL (ref 0.6–4.47)
LYMPHOCYTES NFR BLD AUTO: 33 % (ref 14–44)
MCH RBC QN AUTO: 24.3 PG (ref 26.8–34.3)
MCHC RBC AUTO-ENTMCNC: 31.5 G/DL (ref 31.4–37.4)
MCV RBC AUTO: 77 FL (ref 82–98)
MONOCYTES # BLD AUTO: 0.51 THOUSAND/ΜL (ref 0.17–1.22)
MONOCYTES NFR BLD AUTO: 7 % (ref 4–12)
NEUTROPHILS # BLD AUTO: 4.41 THOUSANDS/ΜL (ref 1.85–7.62)
NEUTS SEG NFR BLD AUTO: 56 % (ref 43–75)
PLATELET # BLD AUTO: 275 THOUSANDS/UL (ref 149–390)
PMV BLD AUTO: 11.1 FL (ref 8.9–12.7)
POTASSIUM SERPL-SCNC: 3.5 MMOL/L (ref 3.5–5.3)
PROT SERPL-MCNC: 6.8 G/DL (ref 6.4–8.2)
RBC # BLD AUTO: 4.52 MILLION/UL (ref 3.81–5.12)
S PNEUM AG UR QL: NEGATIVE
SODIUM SERPL-SCNC: 139 MMOL/L (ref 136–145)
WBC # BLD AUTO: 7.86 THOUSAND/UL (ref 4.31–10.16)

## 2018-02-07 PROCEDURE — 85025 COMPLETE CBC W/AUTO DIFF WBC: CPT | Performed by: PHYSICIAN ASSISTANT

## 2018-02-07 PROCEDURE — 97166 OT EVAL MOD COMPLEX 45 MIN: CPT

## 2018-02-07 PROCEDURE — 82948 REAGENT STRIP/BLOOD GLUCOSE: CPT

## 2018-02-07 PROCEDURE — 97163 PT EVAL HIGH COMPLEX 45 MIN: CPT

## 2018-02-07 PROCEDURE — G8979 MOBILITY GOAL STATUS: HCPCS

## 2018-02-07 PROCEDURE — G8989 SELF CARE D/C STATUS: HCPCS

## 2018-02-07 PROCEDURE — G8978 MOBILITY CURRENT STATUS: HCPCS

## 2018-02-07 PROCEDURE — 80053 COMPREHEN METABOLIC PANEL: CPT | Performed by: PHYSICIAN ASSISTANT

## 2018-02-07 PROCEDURE — G8988 SELF CARE GOAL STATUS: HCPCS

## 2018-02-07 PROCEDURE — 99232 SBSQ HOSP IP/OBS MODERATE 35: CPT | Performed by: HOSPITALIST

## 2018-02-07 PROCEDURE — G8987 SELF CARE CURRENT STATUS: HCPCS

## 2018-02-07 PROCEDURE — 94640 AIRWAY INHALATION TREATMENT: CPT

## 2018-02-07 PROCEDURE — G8980 MOBILITY D/C STATUS: HCPCS

## 2018-02-07 PROCEDURE — 94760 N-INVAS EAR/PLS OXIMETRY 1: CPT

## 2018-02-07 RX ORDER — INSULIN GLARGINE 100 [IU]/ML
40 INJECTION, SOLUTION SUBCUTANEOUS
Status: DISCONTINUED | OUTPATIENT
Start: 2018-02-07 | End: 2018-02-08 | Stop reason: HOSPADM

## 2018-02-07 RX ADMIN — ISODIUM CHLORIDE 3 ML: 0.03 SOLUTION RESPIRATORY (INHALATION) at 19:05

## 2018-02-07 RX ADMIN — ASPIRIN 81 MG 81 MG: 81 TABLET ORAL at 09:55

## 2018-02-07 RX ADMIN — LABETALOL HYDROCHLORIDE 200 MG: 200 TABLET, FILM COATED ORAL at 17:11

## 2018-02-07 RX ADMIN — LABETALOL HYDROCHLORIDE 200 MG: 200 TABLET, FILM COATED ORAL at 09:55

## 2018-02-07 RX ADMIN — ISODIUM CHLORIDE 3 ML: 0.03 SOLUTION RESPIRATORY (INHALATION) at 12:55

## 2018-02-07 RX ADMIN — CEFEPIME HYDROCHLORIDE 1000 MG: 1 INJECTION, SOLUTION INTRAVENOUS at 03:32

## 2018-02-07 RX ADMIN — INSULIN LISPRO 1 UNITS: 100 INJECTION, SOLUTION INTRAVENOUS; SUBCUTANEOUS at 09:53

## 2018-02-07 RX ADMIN — AZITHROMYCIN MONOHYDRATE 500 MG: 500 INJECTION, POWDER, LYOPHILIZED, FOR SOLUTION INTRAVENOUS at 15:02

## 2018-02-07 RX ADMIN — HEPARIN SODIUM 5000 UNITS: 5000 INJECTION, SOLUTION INTRAVENOUS; SUBCUTANEOUS at 05:30

## 2018-02-07 RX ADMIN — HEPARIN SODIUM 5000 UNITS: 5000 INJECTION, SOLUTION INTRAVENOUS; SUBCUTANEOUS at 13:50

## 2018-02-07 RX ADMIN — HEPARIN SODIUM 5000 UNITS: 5000 INJECTION, SOLUTION INTRAVENOUS; SUBCUTANEOUS at 21:12

## 2018-02-07 RX ADMIN — AMLODIPINE BESYLATE 10 MG: 10 TABLET ORAL at 09:55

## 2018-02-07 RX ADMIN — ATORVASTATIN CALCIUM 40 MG: 40 TABLET, FILM COATED ORAL at 17:11

## 2018-02-07 RX ADMIN — ISODIUM CHLORIDE 3 ML: 0.03 SOLUTION RESPIRATORY (INHALATION) at 07:30

## 2018-02-07 RX ADMIN — LEVALBUTEROL HYDROCHLORIDE 1.25 MG: 1.25 SOLUTION, CONCENTRATE RESPIRATORY (INHALATION) at 07:30

## 2018-02-07 RX ADMIN — INSULIN LISPRO 3 UNITS: 100 INJECTION, SOLUTION INTRAVENOUS; SUBCUTANEOUS at 21:12

## 2018-02-07 RX ADMIN — INSULIN LISPRO 3 UNITS: 100 INJECTION, SOLUTION INTRAVENOUS; SUBCUTANEOUS at 17:11

## 2018-02-07 RX ADMIN — INSULIN LISPRO 3 UNITS: 100 INJECTION, SOLUTION INTRAVENOUS; SUBCUTANEOUS at 13:27

## 2018-02-07 RX ADMIN — LEVALBUTEROL HYDROCHLORIDE 1.25 MG: 1.25 SOLUTION, CONCENTRATE RESPIRATORY (INHALATION) at 12:55

## 2018-02-07 RX ADMIN — INSULIN GLARGINE 40 UNITS: 100 INJECTION, SOLUTION SUBCUTANEOUS at 21:12

## 2018-02-07 RX ADMIN — HYDROCHLOROTHIAZIDE: 25 TABLET ORAL at 09:54

## 2018-02-07 RX ADMIN — LEVALBUTEROL HYDROCHLORIDE 1.25 MG: 1.25 SOLUTION, CONCENTRATE RESPIRATORY (INHALATION) at 19:05

## 2018-02-07 RX ADMIN — CEFTRIAXONE 1000 MG: 1 INJECTION, SOLUTION INTRAVENOUS at 13:50

## 2018-02-07 NOTE — PROGRESS NOTES
Progress Note - Hernandez Phoenix 59 y o  female MRN: [id number]    Unit/Bed#: [place] [address] -01 Encounter: [id number]      Assessment/Plan:  · Mild right middle lobe pneumonia community-acquired  ?  afebrile without a white count  ? Negative for flu by PCR  ? strep pneumonia and Legionella negative  ? Started on cefepime and azithromycin in ED  Which has been changed to ceftriaxone Zithromax  No suspicion for gram-negative infection        Additional Problems:   · Shortness of breath:  Likely secondary to obstructive sleep apnea versus coronary artery disease  Recent Lexiscan revealed an EF of 38% with some anterior wall ischemia  I did discuss this with her cardiologist who wanted to review her as an outpatient to decide on whether this was a true result and on whether any further intervention was indicated  Patient is also to follow up at the sleep center to discuss CPAP settings  No evidence of CHF        Pseudo Hyponatremia on presentation secondary to elevated blood sugars this has now normalized  :  Sodium 139 today        Type 2 diabetes with uncontrolled hyperglycemia:  On metformin   Hold here and placed on insulin sliding scale  Blood sugars were in the 350 is on arrival which is now trended down to the 200s  Will increase her Lantus from 35-40 units daily       Essential hypertension:  On labetalol 200 mg twice daily, amlodipine 10 mg daily, and valsartan/hydrochlorothiazide 320/25 mg daily  Initially with elevated blood pressures in the 708B to 351 systolic   However since, blood pressure has trended down without intervention and is now in the 996 systolic we  Will continue to monitor  Add 5 mg of hydralazine for SBP greater than 160      · Hyperlipidemia:  Continue statin     Incidental finding # 1:  Pulmonary nodules:  CT of chest reveals a few pulmonary nodules bilaterally measuring up to 4 mm  Will have patient follow up on this as an outpatient        Addedum--  Reviewed recent Lexiscan   Abnormal study after vasodilation and low level exercise  There was a moderate amount of ischemia in the anterior region  Left ventricular systolic function was reduced, with regional wall motion abnormalities  Discussed with Dr Colin Delacruz active intervention currently  Will review her as an outpatient to decide on further intervention  Subjective:  Patient was admitted with cough and shortness of breath  Patient has chronic shortness of breath and underwent a sleep study recently which does show that she has obstructive sleep apnea  No evidence of CHF currently  Influenza negative  CT reveals mild pneumonia right middle lobe  Physical Exam:   Vitals: Blood pressure 138/84, pulse 76, temperature 98 7 °F (37 1 °C), temperature source Temporal, resp  rate 17, height 5' 8" (1 727 m), SpO2 100 %  ,There is no height or weight on file to calculate BMI  Gen:  Pleasant, non-tachypnic, non-dyspnic  Conversant  Heart: regular rate and rhythm, S1S2 present, no murmur, rub or gallop  Lungs: clear to ausculatation bilaterally  No wheezing, crackless, or rhonchi  No accessory muscle use or respiratory distress  Abd: soft, non-tender, non-distended  NABS, no guarding, rebound or peritoneal signs  Extremities: no clubbing, cyanosis or edema  2+pedal pulses bilaterally  Full range of motion  Neuro: awake, alert and oriented  Cranial nerves 2-12 intact  Strength and sensation grossly intact  Skin: warm and dry: no petechiae, purpura and rash      LABS:     Results from last 7 days  Lab Units 02/07/18  0436 02/06/18  1206   WBC Thousand/uL 7 86 7 78   HEMOGLOBIN g/dL 11 0* 13 0   HEMATOCRIT % 34 9 40 7   PLATELETS Thousands/uL 275 289       Results from last 7 days  Lab Units 02/07/18  0436 02/06/18  1206   SODIUM mmol/L 139 135*   POTASSIUM mmol/L 3 5 3 7   CHLORIDE mmol/L 103 97*   CO2 mmol/L 29 29   BUN mg/dL 21 24   CREATININE mg/dL 0 96 1 16   GLUCOSE RANDOM mg/dL 235* 343* CALCIUM mg/dL 8 6 9 5       Intake/Output Summary (Last 24 hours) at 02/07/18 1207  Last data filed at 02/07/18 0402   Gross per 24 hour   Intake             1390 ml   Output                0 ml   Net             1390 ml           Current Facility-Administered Medications:  acetaminophen 650 mg Oral Q6H PRN Sue Martínez PA-C   amLODIPine 10 mg Oral Daily Sue Martínez PA-C   aspirin 81 mg Oral Daily Sue Martínez PA-C   atorvastatin 40 mg Oral Before [employer] Corporation[employer]ELVA   azithromycin 500 mg Intravenous Once Morgan Vegas PA-C   benzonatate 100 mg Oral TID PRN Albertandalyssa Ralph MELVA Martínez   cefTRIAXone 1,000 mg Intravenous Q24H Sharyle Crazier, MD   heparin (porcine) 5,000 Units Subcutaneous Q8H [facility] Sue Martínez PA-C   insulin glargine 40 Units Subcutaneous HS Sharyle Crazier, MD   insulin lispro 1-5 Units Subcutaneous TID AC Sue Martínez PA-C   insulin lispro 1-5 Units Subcutaneous HS Sue Martínez PA-C   labetalol 200 mg Oral BID Sue Martínez PA-C   levalbuterol 1 25 mg Nebulization TID Morgan Vegas PA-C   ondansetron 4 mg Intravenous Q6H PRN Sue Martínez PA-C   sodium chloride (PF) 3 mL Intravenous PRN Robert Heath   sodium chloride 3 mL Nebulization TID Blanka Zapata MD   valsartan-hydrochlorothiazide (DIOVAN /25) combo dose  Oral Daily Morgan Vegas PA-C       Family update- offered to update family-patient declined

## 2018-02-07 NOTE — PLAN OF CARE

## 2018-02-07 NOTE — PHYSICAL THERAPY NOTE
PT EVALUATION    Pt  Name: Valeria Ortiz  Pt  Age: 59 y o  Patient Active Problem List   Diagnosis    [facility]    Hypertension    Seizures (HCC)    SOB (shortness of breath)    Pneumonia    Elevated d-dimer       Pneumonia [J18 9]  SOB (shortness of breath) [R06 02]  Dyspnea on exertion [R06 09]    Past Medical History:   Diagnosis Date    Diabetes mellitus ([facility] )     Hypertension     Seizures ([facility] )        Past Surgical History:   Procedure Laterality Date    HYSTERECTOMY              02/07/18 1227   Note Type   Note type Eval only   Pain Assessment   Pain Score No Pain   Home Living   Type of [address] One level;Stairs to enter with rails  (3STE (outside); 6+6 steps up to apartment)   Prior Function   Level of [place] Independent with ADLs and functional mobility   Lives With Spouse; Son   Receives Help From Family   ADL Assistance Independent   Falls in the last 6 months 0   Vocational Full time employment   Restrictions/Precautions   Weight Bearing Precautions Per Order No   General   Family/Caregiver Present No   Cognition   Overall Cognitive Status WFL   Arousal/Participation Alert   Orientation Level Oriented X4   Following Commands Follows all commands and directions without difficulty   RUE Assessment   RUE Assessment (defer to OT)   LUE Assessment   LUE Assessment (defer to OT)   RLE Assessment   RLE Assessment WFL   Strength RLE   RLE Overall Strength 4+/5   LLE Assessment   LLE Assessment WFL   Strength LLE   LLE Overall Strength 4+/5   Coordination   Movements are Fluid and Coordinated 1   Sensation WFL   Bed Mobility   Sit to Supine 7  Independent   Additional Comments pt OOB pre session   Transfers   Sit to Stand 7  Independent   Stand to Sit 7  Independent   Ambulation/Elevation   Gait pattern Wide EUGENIE  (inc lateral displacement B/L'ly 2* to body habitus)   Gait Assistance 7  Independent   Assistive Device None   Distance 200'x1   Stair Management Assistance 5  Supervision   Additional items Verbal cues; Increased time required   Stair Management Technique One rail R;Step to pattern; Foreward;Nonreciprocal   Number of Stairs 5   Balance   Static Sitting Normal   Static Standing Normal   Ambulatory Good   Activity Tolerance   Activity Tolerance Patient tolerated treatment well   Nurse Made Aware yes   Assessment   Prognosis Good   Problem List Impaired vision;Obesity   Assessment Pt 59 y o  female admitted for pneumonia  PTA, pt reports being I w/o AD but admits minimal difficulty w/ stair mgt 2* to impaired vision  Pt legally blind  Pt referred to PT for mobility assessment & D/C planning  Pt demonstrates no significant decline in function to warrant skilled PT at this time  Pt  I w/ overall functional mobility including amb however provided S during stair assessment for safety 2* to pt's impaired vision  Gait deviations as above 2* to body habitus but steady w/o AD  SOB noted during amb but relieved w/ rest  O2 sat 98% at RA  Offered SPC for community amb & stair mgt for safety given impaired vision but pt declined  Pt states being at her functional baseline and states no concerns about going back home & to work  Will D/C PT  Pt may return home when medically cleared  Please advise PT when needs change  Pt may ambulate in unit as tolerated to prevent decline in function  Nsg notified      Barriers to Discharge None   Goals   Patient Goals go home   Treatment Day 0   Plan   Treatment/Interventions Spoke to nursing;OT  (PT eval only)   PT Frequency Other (Comment)  (D/C PT)   Recommendation   Recommendation Home with family support   PT - OK to Discharge Yes  (when medically cleared)   Barthel Index   Feeding 10   Bathing 5   Grooming Score 5   Dressing Score 10   Bladder Score 10   Bowels Score 10   Toilet Use Score 10   Transfers (Bed/Chair) Score 15   Mobility (Level Surface) Score 15   Stairs Score 5   Barthel Index Score 95   Hx/personal factors: co-morbidities; fall risk; legally blind; inaccessible home; obesity  Examination: assessed body systems, balance, endurance, amb, fall risk & D/C rec; impaired vision  Clinical: unpredictable (ongoing medical status; abnormal lab values; fall risk)  Complexity: high    Dreama Goltz, PT

## 2018-02-07 NOTE — SOCIAL WORK
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 got herself an apartment  She lives on the 2nd floor of a 2 story walk up with her son  Her "" has been staying with her recently, but she states they are not together  ADL's are completed independently with no DME use  Pt was seeing a PCP on [address] in [place] which is now too far for her to travel to  She plans on calling a doctor one of her friend sees so she can set up an appointment  Pt denies VNA hx  Pt does not drive and either walks, takes the bus, or calls an uber  Pt plans to take an uber home at D/C  Pt denies POA  No needs expressed or identified at this time  CM to follow as needed

## 2018-02-07 NOTE — PLAN OF CARE
DISCHARGE PLANNING     Discharge to home or other facility with appropriate resources Progressing        Knowledge Deficit     Patient/family/caregiver demonstrates understanding of disease process, treatment plan, medications, and discharge instructions Progressing        METABOLIC, FLUID AND ELECTROLYTES - ADULT     Glucose maintained within target range Progressing        PAIN - ADULT     Verbalizes/displays adequate comfort level or baseline comfort level Progressing        Potential for Falls     Patient will remain free of falls Progressing        RESPIRATORY - ADULT     Achieves optimal ventilation and oxygenation Progressing        SAFETY ADULT     Maintain or return to baseline ADL function Progressing     Maintain or return mobility status to optimal level Progressing        SKIN/TISSUE INTEGRITY - ADULT     Skin integrity remains intact Progressing

## 2018-02-07 NOTE — OCCUPATIONAL THERAPY NOTE
[address]  Evaluation     Patient Name: Valeria Ortiz  OFBMB'L Date: 2/7/2018  Problem List  Patient Active Problem List   Diagnosis    [facility]    Hypertension    [facility]    SOB (shortness of breath)    Pneumonia    Elevated d-dimer     Past Medical History  Past Medical History:   Diagnosis Date    [facility]     Hypertension     [facility]      Past Surgical History  Past Surgical History:   Procedure Laterality Date    HYSTERECTOMY           02/07/18 1219   Note Type   Note type Eval only   Restrictions/Precautions   Weight Bearing Precautions Per Order No   Other Precautions Fall Risk   Pain Assessment   Pain Assessment No/denies pain   Pain Score No Pain   Home Living   Type of Home Apartment   Home Layout One level;Stairs to enter with rails  (3 [address] (outside) w/ 6+6 inside to apt on 2nd floor)   Bathroom Shower/Tub Tub/shower unit   Bathroom Toilet Standard   Bathroom Equipment Other (Comment)  (none per pt report)   [address] Other (Comment)  (none per pt report)   Additional Comments Pt reports living w/ spouse and son in one level apt located on 2nd floor without elevator access  Therefor, 3STE from outside and 6+6 to apt on 2nd floor   Prior Function   Level of [place] Independent with ADLs and functional mobility   Lives With Spouse; Son   Wilfredo Help From Family   ADL Assistance Independent   IADLs Independent   Falls in the last 6 months 0   Vocational Full time employment   Comments Pt reports I w/ ADLs and IADLs, (-) , 0 falls, FT employment, and no use of DME/AD PTA   Lifestyle   Autonomy Pt reports I w/ ADLs and IADLs, (-) , 0 falls, FT employment, and no use of DME/AD PTA   Reciprocal Relationships Lives w/ spouse and son   Service to Others FT employment   Intrinsic Gratification Watching TV   Psychosocial   Psychosocial (WDL) WDL   ADL   Eating Assistance 7  Independent Grooming Assistance 6  Modified Independent   UB Bathing Assistance 6  Modified Independent   LB Bathing Assistance 6  Modified Independent   UB Dressing Assistance 6  Modified independent   LB Dressing Assistance 5  Supervision/Setup   LB Dressing Deficit Other (Comment)  (increased anterior lean during functional reaching)   Toileting Assistance  6  Modified independent   Bed Mobility   Supine to Sit 6  Modified independent   Additional items Assist x 1;Bedrails; Increased time required   Sit to Supine 6  Modified independent   Additional items Assist x 1;Bedrails; Increased time required   Transfers   Sit to Stand 6  Modified independent   Additional items Increased time required   Stand to Sit 6  Modified independent   Additional items Increased time required   Functional Mobility   Functional Mobility 5  Supervision   Additional Comments Assist x1 w/o use of AD   Balance   Static Sitting Normal   Dynamic Sitting Good   Static Standing Good   Dynamic Standing Fair +   Ambulatory Fair +   Activity Tolerance   Activity Tolerance Patient tolerated treatment well   RUE Assessment   RUE Assessment WFL   LUE Assessment   LUE Assessment WFL   Hand Function   Gross Motor Coordination Functional   Fine Motor Coordination Functional   Sensation   Light Touch No apparent deficits   Sharp/Dull No apparent deficits   Vision - Complex Assessment   Additional Comments Pt reports she is legally blind in (B) eyes   Cognition   Overall Cognitive Status WFL   Arousal/Participation Alert; Cooperative   Attention Within functional limits   Orientation Level Oriented X4   Memory Within functional limits   Following Commands Follows all commands and directions without difficulty   Assessment   Prognosis Good   Assessment Pt is a 59 y o  female seen for OT evaluation s/p adm [facility] w/ Pneumonia  Comorbidities affecting pts functional performance include a significant PMH of Dm, HTN, Seizures, and SOB   Pt with active OT orders and activity orders for activity as tolerated  Pt lives with spouse and son in a one level apt located on 2nd floor w/o elevator access  At baseline, pt was Iw/ ADLs and IADLs, (-)drove, & required no use of DME/AD  Upon evaluation, pt currently requires Supervision-Mod I for overall ADLs and Supervision-Mod I for functional mobility/transfers 2* the following deficits impacting occupational performance: weakness, decreased strength, decreased tolerance and SOB  These impairments, however do not limit pts ability to safely engage in all baseline areas of occupation, including grooming, bathing, dressing, toileting, functional mobility/transfers, community mobility, laundry , house maintenance, meal prep, cleaning, social participation  and leisure activities as pt is functioning at baseline level  Pt scored overall 90/100 on the Barthel Index  Based on the aforementioned OT evaluation, functional performance deficits, and assessments, pt has been identified as a moderate complexity evaluation  No further acute OT needs identified at this time  Recommend continued mobilization with hospital staff while in the hospital to increase pts endurance and strength upon D/C  From OT standpoint, recommend D/C home with family support when medically cleared  D/C pt from OT caseload at this time      Goals   Patient Goals to go home   Plan   OT Frequency Eval only   Recommendation   OT Discharge Recommendation Home with family support   OT - OK to Discharge Yes  (when medically cleared)   Barthel Index   Feeding 10   Bathing 5   Grooming Score 5   Dressing Score 5   Bladder Score 10   Bowels Score 10   Toilet Use Score 10   Transfers (Bed/Chair) Score 15   Mobility (Level Surface) Score 15   Stairs Score 5   Barthel Index Score 90   Modified Emir Scale   Modified [place] Scale 1     Morena Cardenas OTR/L

## 2018-02-07 NOTE — CASE MANAGEMENT
Initial Clinical Review    Admission: Date/Time/Statement: 2/6/18 @ 1515     Orders Placed This Encounter   Procedures    Inpatient Admission (expected length of stay for this patient is greater than two midnights)     Standing Status:   Standing     Number of Occurrences:   1     Order Specific Question:   Admitting Physician     Answer:   JESICA KAPOOR [[id number]]     Order Specific Question:   Level of Care     Answer:   Med Surg [[id number]]     Order Specific Question:   Estimated length of stay     Answer:   More than 2 Midnights     Order Specific Question:   Certification     Answer:   I certify that inpatient services are medically necessary for this patient for a duration of greater than two midnights  See H&P and MD Progress Notes for additional information about the patient's course of treatment  ED: Date/Time/Mode of Arrival:   ED Arrival Information     Expected Arrival Acuity Means of Arrival Escorted By Service Admission Type    - 2/6/2018 11:11 Urgent Ambulance [facility] Medicine Urgent    Arrival Complaint    HYPERTENSION, SOB          Chief Complaint:   Chief Complaint   Patient presents with    Shortness of Breath     pt comes in with c/o SOB and cough that started few days ago  seen at urgent care (-)flu dx with URI  pt at work started to feel weak and increased productive cough  was also noted pt had blood pressure in 200's and blood sugar >400  pt has hx htn and dm  History of Illness: Mahesh Garcia is a 59 y o  female with past medical history of essential hypertension, hyperlipidemia, type 2 diabetes        She presents with cough and shortness of breath that initially started 1 week ago  Patient states she also had nasal congestion with increased mucus production  She has been bringing up a white thick mucus with her cough  She admits to weakness/tiredness, decreased energy, and poor appetite    Also with some palpitations and some pressure on her chest   Denies any vomiting, abdominal pain, or diarrhea  She did recently have healthcare closure when she brought her  to the emergency room for a problem with his foot and he ended up getting admitted        ED Vital Signs:   ED Triage Vitals [02/06/18 1124]   Temperature Pulse Respirations Blood Pressure SpO2   97 9 °F (36 6 °C) 93 22 (!) 220/107 99 %      Temp Source Heart Rate Source Patient Position - Orthostatic VS BP Location FiO2 (%)   Oral Monitor Lying Right arm --      Pain Score       No Pain        Wt Readings from Last 1 Encounters:   12/18/17 135 kg (298 lb 4 oz)       Vital Signs (abnormal):   02/06/18 1730  --  74  21  --  98 %  --  --   02/06/18 1715  --  76   23  142/81  99 %  --  --   02/06/18 1615  --  74  16  153/89  98 %  None (Room air)  Lying   02/06/18 1452  --  76  22  153/86  97 %  None (Room air)  Lying   02/06/18 1235  --  82  16   184/90  98 %  None (Room air)  Lying   02/06/18 1223  --  --  --  --  --  None (Room air)  --   02/06/18 1124  97 9 °F (36 6 °C)  93  22   220/107  99 %  None (Room air)  Lying         Abnormal Labs/Diagnostic Test Results:   Lab Units 02/06/18  1206   WBC Thousand/uL 7 78   HEMOGLOBIN g/dL 13 0   HEMATOCRIT % 40 7   PLATELETS Thousands/uL 289   NEUTROS PCT % 57   LYMPHS PCT % 35   MONOS PCT % 5   EOS PCT % 2         Results from last 7 days  Lab Units 02/06/18  1206   SODIUM mmol/L 135*   POTASSIUM mmol/L 3 7   CHLORIDE mmol/L 97*   CO2 mmol/L 29   BUN mg/dL 24   CREATININE mg/dL 1 16   CALCIUM mg/dL 9 5   TOTAL PROTEIN g/dL 8 2   BILIRUBIN TOTAL mg/dL 0 55   ALK PHOS U/L 160*   ALT U/L 27   AST U/L 18   GLUCOSE RANDOM mg/dL 343*             Imaging: I have personally reviewed pertinent reports        X-ray Chest 2 Views     Result Date: 2/6/2018  Narrative: CHEST 2 View INDICATION:  Upper respiratory infection with cough and congestion for a week  COMPARISON:  10/20/2017   VIEWS:  PA and lateral projections; 2 images FINDINGS: Cardiomediastinal silhouette appears stable  The lungs are clear  No pneumothorax or pleural effusion  Visualized osseous structures appear within normal limits for the patient's age       Impression: No active pulmonary disease  Workstation performed: [id number]      Nm Procedure (historical)     Result Date: 2018  Narrative: [facility]  [address]  [place], [address]  [phone]  Rest/Stress Gated SPECT Myocardial Perfusion Imaging After Regadenoson  Patient: Kristie Albright  MR number: [id number]  Account number: [de-identified]  : 1960  Age: 59 years  Gender: Female  Status: Outpatient  Location: Stress lab  Height: 68 in  Weight: 298 lb  BP: 141/ 90 mmHg  Allergies: ASPIRIN  Diagnosis: R06 00 - Dyspnea, unspecified  Primary Physician:  Antonio Torres DO  Technician:  Yaw Glaser  RN:  Bushra Herrera RN BSN  Referring Physician:  Hannah Cartagena MD  Group:  [facility]  Report Prepared By[de-identified]  Bushra Herrera RN BSN  Interpreting Physician:  Sj Vásquez MD  INDICATIONS: Detection of Coronary artery disease  HISTORY: The patient is a 59year old  female  Chest pain status: no chest pain  Other symptoms: dyspnea and decreased effort tolerance  Coronary artery disease risk factors: dyslipidemia, hypertension, family history of  premature coronary artery disease, diabetes mellitus, and post-menopausal state  Cardiovascular history: none significant  Co-morbidity: obesity  Medications: a beta blocker, a calcium channel blocker, an ACE inhibitor/ARB, a diuretic,  aspirin, a lipid lowering agent, and diabetic medications  PHYSICAL EXAM: Baseline physical exam screening: no wheezes audible  REST ECG: Normal sinus rhythm  PROCEDURE: The study was performed in the stress lab  A regadenoson infusion pharmacologic stress test was performed   Gated SPECT myocardial perfusion imaging was performed after stress and at rest  Systolic blood pressure was 141 mmHg, at  the start of the study  Diastolic blood pressure was 90 mmHg, at the start of the study  The heart rate was 73 bpm, at the start of the study  IV double checked  Regadenoson protocol:  HR bpm SBP mmHg DBP mmHg Symptoms  Baseline 73 141 90 none  Immediate 118 170 88 moderate dyspnea, fatigue, nausea  3 min 91 205 91 headache  5 min 88 160 90 subsiding  No medications or fluids given  The patient also performed low level exercise  STRESS SUMMARY: Duration of pharmacologic stress was 3 min  Functional capacity could not be adequately assessed  Maximal heart rate during stress was 118 bpm  The heart rate response to stress was normal  There was resting hypertension  with an appropriate blood pressure response to vasodilator stress  The rate-pressure product for the peak heart rate and blood pressure was 48658  There was no chest pain during stress  The stress test was terminated due to protocol  completion  Pre oxygen saturation: 98 %  Peak oxygen saturation: 100 %  The stress ECG was normal  Arrhythmia during stress: isolated premature ventricular beats  ISOTOPE ADMINISTRATION:  Resting isotope administration Stress isotope administration  Agent Tetrofosmin Tetrofosmin  Dose 16 5 mCi 46 5 mCi  Date 01/11/2018 01/11/2018  Injection time 08:23 10:15  Imaging time 09:03 11:05  Injection-image interval 40 min 50 min  The radiopharmaceutical was injected at the peak effect of pharmacologic stress  MYOCARDIAL PERFUSION IMAGING:  The image quality was good  Left ventricular size was normal  The TID ratio was 1 17  PERFUSION DEFECTS:  -  There was a moderate-sized, mildly severe, reversible myocardial perfusion defect of the mid-apical anterior wall  GATED SPECT:  The calculated left ventricular ejection fraction was 38 %  Left ventricular ejection fraction was mildly decreased by visual estimate  There was mildly reduced myocardial thickening and motion of the anterior wall of the left ventricle    SUMMARY:  -  Stress results: There was resting hypertension with an appropriate blood pressure response to vasodilator stress  There was no chest pain during stress  -  ECG conclusions: The stress ECG was normal   -  Perfusion imaging: There was a moderate-sized, mildly severe, reversible myocardial perfusion defect of the mid-apical anterior wall  -  Gated SPECT: The calculated left ventricular ejection fraction was 38 %  Left ventricular ejection fraction was mildly decreased by visual estimate  There was mildly reduced myocardial thickening and motion of the anterior wall of the  left ventricle  IMPRESSIONS: Abnormal study after vasodilation and low level exercise  There was a moderate amount of ischemia in the anterior region  Left ventricular systolic function was reduced, with regional wall motion abnormalities  Prepared and signed by  Pavel Barboza MD  Signed 2018 15:23:40     Cta Ed Chest Pe Study     Result Date: 2018     Impression: 1  No evidence of pulmonary embolism  2   Right middle lobe mild infiltrative changes, centrilobular nodules and tree-in-bud opacity  Findings may represent mild pneumonia and small airways disease  Mild groundglass opacity in the left lower lobe posterolaterally may be related to atelectasis or minimal infiltrate  3   A few pulmonary nodules bilaterally measuring up to 4 mm  Based on current Fleischner Society 2017 Guidelines on incidental pulmonary nodule, no routine follow-up is needed if the patient is considered low risk for lung cancer  If the patient is considered high risk for lung cancer, 12 month follow-up non-contrast chest CT is recommended   Workstation performed: [id number]         EKG, Pathology, and Other Studies Reviewed on Admission:   · EK  normal sinus rhythm       ED Treatment:   Medication Administration from 2018 1111 to 2018 1943       Date/Time Order Dose Route Action Action by Comments     2018 1306 sodium chloride 0 9 % bolus 1,000 mL 0 mL Intravenous Stopped Joyce Galvan, RN      02/06/2018 1206 sodium chloride 0 9 % bolus 1,000 mL 1,000 mL Intravenous New Bag Joyce Galvan, RN      02/06/2018 1412 iohexol (OMNIPAQUE) 350 MG/ML injection (MULTI-DOSE) 85 mL 85 mL Intravenous Given Roberto Carlos Oroscolexis      02/06/2018 1554 cefepime (MAXIPIME) 2 g/50 mL dextrose IVPB 0 mg Intravenous Stopped Errol Evans, RN      02/06/2018 1514 cefepime (MAXIPIME) 2 g/50 mL dextrose IVPB 2,000 mg Intravenous [address] Delos Rather, RN      02/06/2018 1739 azithromycin (ZITHROMAX) 500 mg in sodium chloride 0 9% 250mL IVPB 500 mg 0 mg Intravenous Stopped Delos Cristina, RN      02/06/2018 1554 azithromycin (ZITHROMAX) 500 mg in sodium chloride 0 9% 250mL IVPB 500 mg 500 mg Intravenous New Bag Errol Evans, RN           Past Medical/Surgical History: Active Ambulatory Problems     Diagnosis Date Noted    Seizures ([facility] [address] )      Resolved Ambulatory Problems     Diagnosis Date Noted    No Resolved Ambulatory Problems     Past Medical History:   Diagnosis Date    Diabetes mellitus ([facility] [address] )     Hypertension     Seizures ([facility] [address] )        Admitting Diagnosis: Pneumonia [J18 9]  SOB (shortness of breath) [R06 02]  Dyspnea on exertion [R06 09]    Age/Sex: 59 y o  female    Assessment/Plan: Hospital Problem List:      Principal Problem:    Pneumonia  Active Problems:    Diabetes mellitus ([facility] [address] )    Hypertension    SOB (shortness of breath)    Elevated d-dimer        Primary Problem(s):  · Multi lobe pneumonia   ? CT of chest revealed middle lobe infiltrative changes and mild ground breast opacity in the left lower lobe posterior laterally  ? Fortunately afebrile without a white count  ? Rule out flu by PCR   ? Obtain urine antigens strep pneumonia and Legionella  ? Started on cefepime and azithromycin in ED  Which will be continued given recent exposure to hospital after bring her  to the hospital and was with him while he was being admitted    ? Supple oxygen, supportive care, Mucinex, Xopenex nebulizers      Additional Problems:   · Shortness of breath:  Most likely relation to pneumonia  ER obtain right ear dimer which was elevated in the 3000  Subsequently a CT of the chest was done to rule out PE which was negative for this  But did reveal right middle lobe infiltrative changes, central lobar nodules and tree-in-bud opacity  Findings may represent mild pneumonia and small airways disease  Mild ground-glass opacity in the left lower lobe posterior laterally may be related to atelectasis or minimal infiltrate     · Hyponatremia :  Sodium 135  Will continue to monitor      · Type 2 diabetes: On metformin   Hold here and placed on insulin sliding scale  Continue Lantus 35 units before bed, and Humalog before meals  Here with hyperglycemia  Glucose in the 300s     · Essential hypertension:  On labetalol 200 mg twice daily, amlodipine 10 mg daily, and valsartan/hydrochlorothiazide 320/25 mg daily  Initially with elevated blood pressures in the 207R to 748 systolic we  However since, blood pressure has trended down without intervention and is now in the 931 systolic we  Will continue to monitor  Add 5 mg of hydralazine for SBP greater than 160      · Hyperlipidemia:  Continue statin     · Incidental finding # 1:  Pulmonary nodules:  CT of chest reveals a few pulmonary nodules bilaterally measuring up to 4 mm          VTE Prophylaxis: Heparin  / sequential compression device   Code Status:  Full code  Anticipated Length of Stay:  Patient will be admitted on an Inpatient basis with an anticipated length of stay of  greater than 2 midnights   Justification for Hospital Stay:  Pneumonia with need for IV antibiotics       Admission Orders:  [email or web address]  PT/OT  SEQ COMP DEVICE  CONS CARB DIET  ACTIVITY AS ADAN  CARDIAC MONITORING    Scheduled Meds:   Current Facility-Administered Medications:  acetaminophen 650 mg Oral Q6H PRN Sue Martínez PA-C   amLODIPine 10 mg Oral Daily Delrae Angelucci, PA-C   aspirin 81 mg Oral Daily Sue Martínez PA-C   atorvastatin 40 mg Oral Before [place] MELVA Dominguez   azithromycin 500 mg Intravenous Once Delrae Angelucci, PA-C   benzonatate 100 mg Oral TID PRN Delrae Angelucci, PA-C   cefTRIAXone 1,000 mg Intravenous Q24H John Ruiz MD   heparin (porcine) 5,000 Units Subcutaneous Q8H [address] Sue Martínez PA-C   insulin glargine 35 Units Subcutaneous HS Sue Martínez PA-C   insulin lispro 1-5 Units Subcutaneous TID AC Sue Martínez PA-C   insulin lispro 1-5 Units Subcutaneous HS Sue Martínez PA-C   labetalol 200 mg Oral BID Sue Martínez PA-C   levalbuterol 1 25 mg Nebulization TID Delrae Angelucci, PA-C   ondansetron 4 mg Intravenous Q6H PRN Sue Martínez PA-C   sodium chloride (PF) 3 mL Intravenous PRN Robert Heath   sodium chloride 3 mL Nebulization TID Hira Montelongo MD   valsartan-hydrochlorothiazide (DIOVAN /25) combo dose  Oral Daily Sue Martínez PA-C     Continuous Infusions:    PRN Meds:   acetaminophen    benzonatate    ondansetron    Insert peripheral IV **AND** sodium chloride (PF)

## 2018-02-08 VITALS
OXYGEN SATURATION: 97 % | HEIGHT: 68 IN | SYSTOLIC BLOOD PRESSURE: 135 MMHG | RESPIRATION RATE: 18 BRPM | DIASTOLIC BLOOD PRESSURE: 71 MMHG | TEMPERATURE: 97.4 F | HEART RATE: 73 BPM

## 2018-02-08 PROBLEM — R79.89 ELEVATED D-DIMER: Status: RESOLVED | Noted: 2018-02-06 | Resolved: 2018-02-08

## 2018-02-08 PROBLEM — R06.02 SOB (SHORTNESS OF BREATH): Status: RESOLVED | Noted: 2018-02-06 | Resolved: 2018-02-08

## 2018-02-08 LAB
GLUCOSE SERPL-MCNC: 184 MG/DL (ref 65–140)
GLUCOSE SERPL-MCNC: 191 MG/DL (ref 65–140)
GLUCOSE SERPL-MCNC: 196 MG/DL (ref 65–140)
GLUCOSE SERPL-MCNC: 198 MG/DL (ref 65–140)
GLUCOSE SERPL-MCNC: 201 MG/DL (ref 65–140)

## 2018-02-08 PROCEDURE — G0008 ADMIN INFLUENZA VIRUS VAC: HCPCS | Performed by: HOSPITALIST

## 2018-02-08 PROCEDURE — 94760 N-INVAS EAR/PLS OXIMETRY 1: CPT

## 2018-02-08 PROCEDURE — G0009 ADMIN PNEUMOCOCCAL VACCINE: HCPCS | Performed by: HOSPITALIST

## 2018-02-08 PROCEDURE — 82948 REAGENT STRIP/BLOOD GLUCOSE: CPT

## 2018-02-08 PROCEDURE — 94640 AIRWAY INHALATION TREATMENT: CPT

## 2018-02-08 PROCEDURE — 99239 HOSP IP/OBS DSCHRG MGMT >30: CPT | Performed by: HOSPITALIST

## 2018-02-08 PROCEDURE — 90732 PPSV23 VACC 2 YRS+ SUBQ/IM: CPT | Performed by: HOSPITALIST

## 2018-02-08 PROCEDURE — 90686 IIV4 VACC NO PRSV 0.5 ML IM: CPT | Performed by: HOSPITALIST

## 2018-02-08 RX ORDER — FLUTICASONE PROPIONATE 50 MCG
SPRAY, SUSPENSION (ML) NASAL
Refills: 0 | COMMUNITY
Start: 2018-02-02 | End: 2018-06-27 | Stop reason: SDUPTHER

## 2018-02-08 RX ORDER — ACETAMINOPHEN 500 MG
TABLET ORAL
Refills: 0 | COMMUNITY
Start: 2018-02-02 | End: 2018-06-24 | Stop reason: CLARIF

## 2018-02-08 RX ORDER — SODIUM CHLORIDE 0.65 %
AEROSOL, SPRAY (ML) NASAL
Refills: 0 | COMMUNITY
Start: 2018-02-02 | End: 2018-06-24 | Stop reason: CLARIF

## 2018-02-08 RX ORDER — CEFDINIR 300 MG/1
300 CAPSULE ORAL EVERY 12 HOURS SCHEDULED
Qty: 4 CAPSULE | Refills: 0 | Status: SHIPPED | OUTPATIENT
Start: 2018-02-08 | End: 2018-02-10

## 2018-02-08 RX ADMIN — AMLODIPINE BESYLATE 10 MG: 10 TABLET ORAL at 09:04

## 2018-02-08 RX ADMIN — ISODIUM CHLORIDE 3 ML: 0.03 SOLUTION RESPIRATORY (INHALATION) at 13:25

## 2018-02-08 RX ADMIN — LEVALBUTEROL HYDROCHLORIDE 1.25 MG: 1.25 SOLUTION, CONCENTRATE RESPIRATORY (INHALATION) at 13:25

## 2018-02-08 RX ADMIN — ASPIRIN 81 MG 81 MG: 81 TABLET ORAL at 09:04

## 2018-02-08 RX ADMIN — HYDROCHLOROTHIAZIDE: 25 TABLET ORAL at 09:03

## 2018-02-08 RX ADMIN — INSULIN LISPRO 1 UNITS: 100 INJECTION, SOLUTION INTRAVENOUS; SUBCUTANEOUS at 09:02

## 2018-02-08 RX ADMIN — LABETALOL HYDROCHLORIDE 200 MG: 200 TABLET, FILM COATED ORAL at 09:04

## 2018-02-08 RX ADMIN — INSULIN LISPRO 1 UNITS: 100 INJECTION, SOLUTION INTRAVENOUS; SUBCUTANEOUS at 11:47

## 2018-02-08 RX ADMIN — LEVALBUTEROL HYDROCHLORIDE 1.25 MG: 1.25 SOLUTION, CONCENTRATE RESPIRATORY (INHALATION) at 07:31

## 2018-02-08 RX ADMIN — PNEUMOCOCCAL VACCINE POLYVALENT 0.5 ML
25; 25; 25; 25; 25; 25; 25; 25; 25; 25; 25; 25; 25; 25; 25; 25; 25; 25; 25; 25; 25; 25; 25 INJECTION, SOLUTION INTRAMUSCULAR; SUBCUTANEOUS at 13:35

## 2018-02-08 RX ADMIN — ISODIUM CHLORIDE 3 ML: 0.03 SOLUTION RESPIRATORY (INHALATION) at 07:31

## 2018-02-08 RX ADMIN — INFLUENZA VIRUS VACCINE 0.5 ML: 15; 15; 15; 15 SUSPENSION INTRAMUSCULAR at 13:34

## 2018-02-08 RX ADMIN — HEPARIN SODIUM 5000 UNITS: 5000 INJECTION, SOLUTION INTRAVENOUS; SUBCUTANEOUS at 05:05

## 2018-02-08 NOTE — PLAN OF CARE
DISCHARGE PLANNING     Discharge to home or other facility with appropriate resources Progressing        DISCHARGE PLANNING - CARE MANAGEMENT     Discharge to post-acute care or home with appropriate resources Progressing        Knowledge Deficit     Patient/family/caregiver demonstrates understanding of disease process, treatment plan, medications, and discharge instructions Progressing        METABOLIC, FLUID AND ELECTROLYTES - ADULT     Glucose maintained within target range Progressing        PAIN - ADULT     Verbalizes/displays adequate comfort level or baseline comfort level Progressing        Potential for Falls     Patient will remain free of falls Progressing        RESPIRATORY - ADULT     Achieves optimal ventilation and oxygenation Progressing        SAFETY ADULT     Maintain or return to baseline ADL function Progressing     Maintain or return mobility status to optimal level Progressing        SKIN/TISSUE INTEGRITY - ADULT     Skin integrity remains intact Progressing

## 2018-02-08 NOTE — DISCHARGE SUMMARY
Discharge Summary - Medical Anayeli Poster 59 y o  female MRN: [id number]    [address] MED SURG Room / Bed: [place] [address] 2 /South 2 M* Encounter: [id number]    BRIEF OVERVIEW      Admission Date: 2/6/2018       Discharge Date:  02/08/2018    Admitting Diagnosis:    Primary Discharge Diagnosis  Principal Problem:    Pneumonia  Active Problems:    Diabetes mellitus ([address] )    Hypertension  Resolved Problems:    SOB (shortness of breath)    Elevated d-dimer      Service:  [facility]      Assessment and plan on date of discharge    · Mild right middle lobe pneumonia community-acquired  ?  afebrile without a white count  ? Negative for flu by PCR  ?  strep pneumonia and Legionella negative  ? On ceftriaxone Zithromax#3/5-will switch to Omnicef to complete a 5 day course        Additional Problems:   · Shortness of breath:  Likely secondary to obstructive sleep apnea versus coronary artery disease  Recent Lexiscan revealed an EF of 38% with some anterior wall ischemia   I did discuss this with her cardiologist Dr Marielena Ramirez who wanted to review her as an outpatient to decide on whether this was a true result and on whether any further intervention was indicated   Patient is also to follow up at the sleep center to discuss CPAP settings   No evidence of CHF           Type 2 diabetes with uncontrolled hyperglycemia:  On metformin    Hold here and placed on insulin sliding scale   Blood sugars were in the 350 is on arrival which is now trended down to the 200s   Will keep her Lantus to 35      She follows up with Endocrinology as an outpatient      Essential hypertension:  Presented with hypertensive urgency which quickly resolved   On labetalol 200 mg twice daily, amlodipine 10 mg daily, and valsartan/hydrochlorothiazide 320/25 mg daily   Initially with elevated blood pressures in the 281H to 712 systolic    However since, blood pressure has trended down without intervention and is now in the 492 systolic      Hyperlipidemia:  Continue statin      Addedum--  Reviewed recent Lexiscan   Abnormal study after vasodilation and low level exercise  There was a moderate amount of ischemia in the anterior region  Left ventricular systolic function was reduced, with regional wall motion abnormalities      Discussed with Dr Katheryn Tabor active intervention currently  Gabriel Raymundo review her as an outpatient to decide on further intervention        Stable for discharge home    Discharge Condition: Improved    Discharge Disposition: Home/Self Care    Discharge summary:     Estrella Camp is an extremely pleasant 57-year-old female with a past medical history of diabetes mellitus type 2, dyslipidemia, essential hypertension who was admitted with cough and shortness of breath of 1 week duration  In addition patient also had nasal congestion and increased mucus production  In the ED she was evaluated for this and noted to have mild right middle lobe pneumonia  Her systolic blood pressure was noted to be elevated to the 180s-but this quickly normalized to the 130s  It was felt to be secondary to the stress in the ED since her  was getting admitted  Her blood sugars were noted to be 350 as well  This trended down to the 200s  Patient was started on ceftriaxone and Zithromax  She improved with regards to her cough  she was evaluated earlier, prior to admission, with a Dottie Guillenan for shortness of breath  This did reveal some reduced EF of about 38% and a moderate amount of ischemia in the anterior region -I did discuss this with her cardiologist who recommended following up as an outpatient next week where he will review the results and decide on further intervention  She has been switched to ANTHONY HOLLIE to complete a 5 day course  Her flu and strep pneumonia were negative  Her blood sugars are now in the 180s to 200s  She does follow up with Endocrinology as an outpatient    I have not changed her insulin regimen and will defer to Endocrinology for this  She remains stable for discharge          Discharge Medications   Please see Medical Reconciliation Discharge Form    Discharge Follow Up Appointments:   PCP  Endocrinology  Cardiology     Discharge  Statement   Total Time Spent today including physical exam, discussion with patient and family, and discharge arrangements/care 38 minutes

## 2018-02-08 NOTE — PROGRESS NOTES
Progress Note - Soo Greer 59 y o  female MRN: [id number]    Unit/Bed#: [place] [address] -01 Encounter: [id number]      Assessment/Plan:    · Mild right middle lobe pneumonia community-acquired  ?  afebrile without a white count  ? Negative for flu by PCR  ? strep pneumonia and Legionella negative  ? On ceftriaxone Zithromax#3/5-will switch to Omnicef to complete a 5 day course        Additional Problems:   · Shortness of breath:  Likely secondary to obstructive sleep apnea versus coronary artery disease  Recent Lexiscan revealed an EF of 38% with some anterior wall ischemia  I did discuss this with her cardiologist Dr Arpan Bonilla who wanted to review her as an outpatient to decide on whether this was a true result and on whether any further intervention was indicated  Patient is also to follow up at the sleep center to discuss CPAP settings  No evidence of CHF          Type 2 diabetes with uncontrolled hyperglycemia:  On metformin    Hold here and placed on insulin sliding scale  Blood sugars were in the 350 is on arrival which is now trended down to the 200s  Will increase her Lantus to 40 units daily  She follows up with Endocrinology as an outpatient      Essential hypertension:  On labetalol 200 mg twice daily, amlodipine 10 mg daily, and valsartan/hydrochlorothiazide 320/25 mg daily   Initially with elevated blood pressures in the 245B to 539 systolic    However since, blood pressure has trended down without intervention and is now in the 837 systolic we  Dana Flores continue to monitor   Add 5 mg of hydralazine for SBP greater than 160      Hyperlipidemia:  Continue statin      Addedum--  Reviewed recent Lexiscan   Abnormal study after vasodilation and low level exercise  There was a moderate amount of ischemia in the anterior region  Left ventricular systolic function was reduced, with regional wall motion abnormalities      Discussed with Dr Kayli Antonio active intervention currently    Will review her as an outpatient to decide on further intervention        Stable for discharge home    Subjective:  Patient is of febrile  Denies any shortness of breath  Will follow up with Cardiology as an outpatient for her an abnormal stress test   Patient will be switched to [address] on discharge to complete a 5 day course  Blood pressure remained stable  Physical Exam:   Vitals: Blood pressure 135/71, pulse 70, temperature (!) 97 4 °F (36 3 °C), temperature source Tympanic, resp  rate 16, height 5' 8" (1 727 m), SpO2 96 %  ,There is no height or weight on file to calculate BMI  Gen:  Pleasant, non-tachypnic, non-dyspnic  Conversant  Heart: regular rate and rhythm, S1S2 present, no murmur, rub or gallop  Lungs: clear to ausculatation bilaterally  No wheezing, crackless, or rhonchi  No accessory muscle use or respiratory distress  Abd: soft, non-tender, non-distended  NABS, no guarding, rebound or peritoneal signs  Extremities: no clubbing, cyanosis or edema  2+pedal pulses bilaterally  Full range of motion  Neuro: awake, alert and oriented  Cranial nerves 2-12 intact  Strength and sensation grossly intact  Skin: warm and dry: no petechiae, purpura and rash      LABS:     Results from last 7 days  Lab Units 02/07/18  0436 02/06/18  1206   WBC Thousand/uL 7 86 7 78   HEMOGLOBIN g/dL 11 0* 13 0   HEMATOCRIT % 34 9 40 7   PLATELETS Thousands/uL 275 289       Results from last 7 days  Lab Units 02/07/18  0436 02/06/18  1206   SODIUM mmol/L 139 135*   POTASSIUM mmol/L 3 5 3 7   CHLORIDE mmol/L 103 97*   CO2 mmol/L 29 29   BUN mg/dL 21 24   CREATININE mg/dL 0 96 1 16   GLUCOSE RANDOM mg/dL 235* 343*   CALCIUM mg/dL 8 6 9 5       Intake/Output Summary (Last 24 hours) at 02/08/18 1109  Last data filed at 02/07/18 1602   Gross per 24 hour   Intake              430 ml   Output                0 ml   Net              430 ml           Current Facility-Administered Medications:  acetaminophen 650 mg Oral Q6H PRN Princess Mahmood MELVA Martínez    amLODIPine 10 mg Oral Daily Sue Martínez PA-C    aspirin 81 mg Oral Daily Sue Martínez PA-C    atorvastatin 40 mg Oral Before [place] MELVA Dominguez    benzonatate 100 mg Oral TID PRN Mankarthik River PA-C    cefTRIAXone 1,000 mg Intravenous Q24H Lien Vargas MD Last Rate: Stopped (02/07/18 1420)   heparin (porcine) 5,000 Units Subcutaneous Q8H [facility] Sue Martínez PA-C    insulin glargine 40 Units Subcutaneous HS Samanta Fofana MD    insulin lispro 1-5 Units Subcutaneous TID AC Sue Martínez PA-C    insulin lispro 1-5 Units Subcutaneous HS Sue Martínez PA-C    labetalol 200 mg Oral BID Sue Martínez PA-C    levalbuterol 1 25 [facility]ID [facility]    ondansetron 4 mg Intravenous Q6H PRN Sue Martínez PA-C    sodium chloride (PF) 3 mL Intravenous PRN Robert Heath    sodium chloride 3 mL Nebulization TID Angelito Garcia MD    valsartan-hydrochlorothiazide (DIOVAN /25) combo dose  Oral Daily [facility] MELVA River

## 2018-02-08 NOTE — PROGRESS NOTES
Agree with previous nurse's assessment except lung sounds are clear throughout and patient denies any sputum production at this time  Patient resting comfortably in bed  Tameka Berkowitz to go home today  Call bell is within reach  Denies any complaints  Will continue to monitor and assess patient

## 2018-02-08 NOTE — PLAN OF CARE
DISCHARGE PLANNING     Discharge to home or other facility with appropriate resources Adequate for Discharge        DISCHARGE PLANNING - CARE MANAGEMENT     Discharge to post-acute care or home with appropriate resources Adequate for Discharge        Knowledge Deficit     Patient/family/caregiver demonstrates understanding of disease process, treatment plan, medications, and discharge instructions Adequate for Discharge        METABOLIC, FLUID AND ELECTROLYTES - ADULT     Glucose maintained within target range Adequate for Discharge        PAIN - ADULT     Verbalizes/displays adequate comfort level or baseline comfort level Adequate for Discharge        Potential for Falls     Patient will remain free of falls Adequate for Discharge        RESPIRATORY - ADULT     Achieves optimal ventilation and oxygenation Adequate for Discharge        SAFETY ADULT     Maintain or return to baseline ADL function Adequate for Discharge     Maintain or return mobility status to optimal level Adequate for Discharge        SKIN/TISSUE INTEGRITY - ADULT     Skin integrity remains intact Adequate for Discharge

## 2018-02-09 ENCOUNTER — TRANSITIONAL CARE MANAGEMENT (OUTPATIENT)
Dept: INTERNAL MEDICINE CLINIC | Facility: CLINIC | Age: 58
End: 2018-02-09

## 2018-02-09 ENCOUNTER — TELEPHONE (OUTPATIENT)
Dept: INTERNAL MEDICINE CLINIC | Facility: CLINIC | Age: 58
End: 2018-02-09

## 2018-02-13 ENCOUNTER — OFFICE VISIT (OUTPATIENT)
Dept: CARDIOLOGY CLINIC | Facility: CLINIC | Age: 58
End: 2018-02-13
Payer: MEDICARE

## 2018-02-13 VITALS
HEART RATE: 92 BPM | RESPIRATION RATE: 20 BRPM | SYSTOLIC BLOOD PRESSURE: 122 MMHG | DIASTOLIC BLOOD PRESSURE: 72 MMHG | BODY MASS INDEX: 44.41 KG/M2 | HEIGHT: 68 IN | WEIGHT: 293 LBS

## 2018-02-13 DIAGNOSIS — R94.39 ABNORMAL STRESS TEST: ICD-10-CM

## 2018-02-13 DIAGNOSIS — I77.819 AORTIC DILATATION (HCC): ICD-10-CM

## 2018-02-13 DIAGNOSIS — R06.00 DYSPNEA ON EXERTION: Primary | ICD-10-CM

## 2018-02-13 DIAGNOSIS — I28.8 ENLARGED PULMONARY ARTERY (HCC): ICD-10-CM

## 2018-02-13 PROBLEM — R06.09 DYSPNEA ON EXERTION: Status: ACTIVE | Noted: 2018-02-13

## 2018-02-13 PROCEDURE — 99214 OFFICE O/P EST MOD 30 MIN: CPT | Performed by: INTERNAL MEDICINE

## 2018-02-13 RX ORDER — VALSARTAN AND HYDROCHLOROTHIAZIDE 320; 12.5 MG/1; MG/1
TABLET, FILM COATED ORAL
COMMUNITY
Start: 2017-12-25 | End: 2018-02-13 | Stop reason: ALTCHOICE

## 2018-02-13 NOTE — LETTER
2018     Pat Taylor DO  [id number] [facility] [address] [address]    Patient: Miguel A Raymond   YOB: 1960   Date of Visit: 2018       Dear Dr John Schmidt:    Thank you for referring Christina Mahmood to me for evaluation  Below are my notes for this consultation  If you have questions, please do not hesitate to call me  I look forward to following your patient along with you  Sincerely,        Christine Aponte MD        CC: No Recipients  Christine Aponte MD  2018 10:02 AM  Sign at close encounter  [address] Cardiology [place]  [address] [facility]  [phone]    Cardiology Follow up    Patient:  Miguel A Raymond  :  1960  MRN:  [id number]    History of Present Illness:     18-year-old female with past medical history of diabetes, hypertension, recently diagnosed reportedly mild sleep apnea, remote history of seizures, presents for follow-up  She was recently in the hospital for pneumonia and is recovered from this  She does feel somewhat lethargic since leaving the hospital       For few months she has had dyspnea on exertion without chest pain  She notes no palpitations, dizziness, or syncope  Patient Active Problem List   Diagnosis    [facility]    Hypertension    Seizures ([facility]    Pneumonia    Dyspnea on exertion       Past Surgical History  Past Surgical History:   Procedure Laterality Date    HYSTERECTOMY         Social History   Social History     Social History    Marital status: /Civil Union     Spouse name: N/A    Number of children: N/A    Years of education: N/A     Occupational History    Not on file       Social History Main Topics    Smoking status: Never Smoker    Smokeless tobacco: Never Used    Alcohol use No    Drug use: No    Sexual activity: Not on file     Other Topics Concern    Not on file     Social History Narrative    No narrative on file        Allergies   Allergen Reactions    Aspirin      Pt cannot specify if she has an ASA allergy; pt is on ASA 81 mg daily       Family History   No family history on file  Review of Systems:  Review of Systems   Constitutional: Positive for fatigue  Negative for chills and fever  HENT: Negative for hearing loss, nosebleeds and tinnitus  Eyes: Negative for pain  Respiratory: Positive for shortness of breath  Negative for cough and chest tightness  Cardiovascular: Negative for chest pain, palpitations and leg swelling  Gastrointestinal: Negative for abdominal pain, nausea and vomiting  Endocrine: Negative for cold intolerance and heat intolerance  Genitourinary: Negative for difficulty urinating, hematuria and vaginal bleeding  Musculoskeletal: Negative for arthralgias  Skin: Negative for rash  Allergic/Immunologic: Negative for environmental allergies  Neurological: Negative for dizziness, syncope, weakness and light-headedness  Psychiatric/Behavioral: Negative for decreased concentration and sleep disturbance  The patient is not nervous/anxious            Current Outpatient Prescriptions:     amLODIPine (NORVASC) 10 mg tablet, Take 1 tablet by mouth daily, Disp: , Rfl:     aspirin 81 MG tablet, Take 1 tablet by mouth daily, Disp: , Rfl:     atorvastatin (LIPITOR) 40 mg tablet, Take 40 mg by mouth daily  , Disp: , Rfl:     SARAH MICROLET LANCETS lancets, Inject 1 applicator into the skin 4 (four) times a day, Disp: , Rfl:     benzonatate (TESSALON PERLES) 100 mg capsule, Take 100 mg by mouth 3 (three) times a day as needed for cough, Disp: , Rfl:     Cholecalciferol (VITAMIN D3) 2000 units capsule, Take by mouth, Disp: , Rfl:     glucose blood (SARAH CONTOUR NEXT TEST) test strip, 1 applicator by In Vitro route 4 (four) times a day, Disp: , Rfl:     Insulin Glargine (LANTUS SOLOSTAR) 100 UNIT/ML SOPN, Inject 35 Units under the skin daily at bedtime  , Disp: , Rfl:    insulin lispro (HUMALOG KWIKPEN) 100 Units/mL SOPN, Inject under the skin 3 (three) times a day with meals  , Disp: , Rfl:     Insulin Pen Needle (BD PEN NEEDLE DERRICK U/F) 32G X 4 MM MISC, Inject 1 applicator under the skin 4 (four) times a day, Disp: , Rfl:     labetalol (NORMODYNE) 200 mg tablet, Take 200 mg by mouth 2 (two) times a day, Disp: , Rfl:     metFORMIN (GLUCOPHAGE) 500 mg tablet, Take 500 mg by mouth 2 (two) times a day with meals  , Disp: , Rfl:     valsartan-hydrochlorothiazide (DIOVAN-HCT) 320-25 MG per tablet, Take 1 tablet by mouth daily, Disp: , Rfl:     fluticasone (FLONASE) 50 mcg/act nasal spray, INSTILL 1 SPRAY INTO EACH NOSTRIL ONCE DAILY FOR 2 WEEKS, Disp: , Rfl: 0    MAPAP 500 MG tablet, take 1-2 tablet by mouth every 6 hours if needed for pain or fever, Disp: , Rfl: 0    RA SALINE NASAL SPRAY 0 65 % nasal spray, INSTILL 1 TO 2 SPRAYS INTO EACH NOSTRIL EVERY HOUR AS NEEDED FOR NASAL DRYNESS OR SINUS CONGESTION, Disp: , Rfl: 0     Physical Exam:    Vitals:    02/13/18 0859   BP: 122/72   BP Location: Left arm   Patient Position: Sitting   Cuff Size: Large   Pulse: 92   Resp: 20   Weight: (!) 138 kg (304 lb)   Height: 5' 8" (1 727 m)       Physical Exam   Constitutional: She is oriented to person, place, and time  She appears well-developed and well-nourished  HENT:   Head: Normocephalic  Right Ear: External ear normal    Left Ear: External ear normal    Mouth/Throat: Oropharynx is clear and moist    Eyes: Pupils are equal, round, and reactive to light  Neck: No JVD present  Carotid bruit is not present  Cardiovascular: Normal rate, regular rhythm and intact distal pulses  Exam reveals no gallop and no friction rub  No murmur heard  Pulmonary/Chest: Effort normal and breath sounds normal  No tachypnea  No respiratory distress  She has no wheezes  She has no rales  She exhibits no tenderness  Abdominal: Soft  She exhibits no distension  There is no tenderness   There is no rebound and no guarding  Musculoskeletal: She exhibits no edema  Neurological: She is alert and oriented to person, place, and time  Skin: Skin is warm and dry  Psychiatric: She has a normal mood and affect  Her behavior is normal  Judgment and thought content normal    Nursing note and vitals reviewed  Labs:not applicable    Assessment/Plan:    1  Dyspnea on exertion- her stress test was mildly positive though this may be a false positive  I would like to get a dobutamine stress echocardiogram to make sure that there is not any significant ischemia that could be contributing  It is possible that her dyspnea on exertion is multifactorial with potentially a component of obesity  I did see that her thyroid function was ordered for December but I am not sure if this was been done  I have given her a referral to Pulmonary as I think this may be helpful given her significant dyspnea on exertion  2  Hypertension - this is currently controlled  3  Enlarged pulmonary artery as well as ascending aorta -will follow up on this periodically  We will continued to have good blood pressure control - I also  mentioned to her that it will be good to start on treatment for sleep apnea to keep her pulmonary pressures down given her enlarged pulmonary artery  We will see her back in about 2 months            Marielena Flores MD  2/13/2018  9:50 AM

## 2018-02-13 NOTE — LETTER
2018     No Recipients    Patient: Mahesh Garcia   YOB: 1960   Date of Visit: 2018       Dear Dr Ann Seymour Recipients: Thank you for referring Jerry Hou to me for evaluation  Below are my notes for this consultation  If you have questions, please do not hesitate to call me  I look forward to following your patient along with you  Sincerely,        Hannah Cartagena MD        CC: No Recipients  Hannah Cartagena MD  2018  9:53 AM  Incomplete  [facility] Cardiology [facility] AT [facility] [place] [phone]    Cardiology Follow up    Patient:  Mahesh Garcia  :  1960  MRN:  [id number]    History of Present Illness:     ***    Patient Active Problem List   Diagnosis    Diabetes mellitus ([facility] [address] )    Hypertension    Seizures ([facility] [address] )    Pneumonia    Dyspnea on exertion       Past Surgical History  Past Surgical History:   Procedure Laterality Date    HYSTERECTOMY         Social History   Social History     Social History    Marital status: /Civil Union     Spouse name: N/A    Number of children: N/A    Years of education: N/A     Occupational History    Not on file  Social History Main Topics    Smoking status: Never Smoker    Smokeless tobacco: Never Used    Alcohol use No    Drug use: No    Sexual activity: Not on file     Other Topics Concern    Not on file     Social History Narrative    No narrative on file        Allergies   Allergen Reactions    Aspirin      Pt cannot specify if she has an ASA allergy; pt is on ASA 81 mg daily       Family History   No family history on file      Review of Systems:  Review of Systems      Current Outpatient Prescriptions:     amLODIPine (NORVASC) 10 mg tablet, Take 1 tablet by mouth daily, Disp: , Rfl:     aspirin 81 MG tablet, Take 1 tablet by mouth daily, Disp: , Rfl:     atorvastatin (LIPITOR) 40 mg tablet, Take 40 mg by mouth daily  , Disp: , Rfl:     SARAH MICROLET LANCETS lancets, Inject 1 applicator into the skin 4 (four) times a day, Disp: , Rfl:     benzonatate (TESSALON PERLES) 100 mg capsule, Take 100 mg by mouth 3 (three) times a day as needed for cough, Disp: , Rfl:     Cholecalciferol (VITAMIN D3) 2000 units capsule, Take by mouth, Disp: , Rfl:     glucose blood (SARAH CONTOUR NEXT TEST) test strip, 1 applicator by In Vitro route 4 (four) times a day, Disp: , Rfl:     Insulin Glargine (LANTUS SOLOSTAR) 100 UNIT/ML SOPN, Inject 35 Units under the skin daily at bedtime  , Disp: , Rfl:     insulin lispro (HUMALOG KWIKPEN) 100 Units/mL SOPN, Inject under the skin 3 (three) times a day with meals  , Disp: , Rfl:     Insulin Pen Needle (BD PEN NEEDLE DERRICK U/F) 32G X 4 MM MISC, Inject 1 applicator under the skin 4 (four) times a day, Disp: , Rfl:     labetalol (NORMODYNE) 200 mg tablet, Take 200 mg by mouth 2 (two) times a day, Disp: , Rfl:     metFORMIN (GLUCOPHAGE) 500 mg tablet, Take 500 mg by mouth 2 (two) times a day with meals  , Disp: , Rfl:     valsartan-hydrochlorothiazide (DIOVAN-HCT) 320-25 MG per tablet, Take 1 tablet by mouth daily, Disp: , Rfl:     fluticasone (FLONASE) 50 mcg/act nasal spray, INSTILL 1 SPRAY INTO EACH NOSTRIL ONCE DAILY FOR 2 WEEKS, Disp: , Rfl: 0    MAPAP 500 MG tablet, take 1-2 tablet by mouth every 6 hours if needed for pain or fever, Disp: , Rfl: 0    RA SALINE NASAL SPRAY 0 65 % nasal spray, INSTILL 1 TO 2 SPRAYS INTO EACH NOSTRIL EVERY HOUR AS NEEDED FOR NASAL DRYNESS OR SINUS CONGESTION, Disp: , Rfl: 0     Physical Exam:    Vitals:    02/13/18 0859   BP: 122/72   BP Location: Left arm   Patient Position: Sitting   Cuff Size: Large   Pulse: 92   Resp: 20   Weight: (!) 138 kg (304 lb)   Height: 5' 8" (1 727 m)       Physical Exam    Labs:{Recent [facility]:74683::"UZV applicable"}    Assessment/Plan:    ***      Howard Hinds MD  2/13/2018  9:50 AM

## 2018-02-13 NOTE — PROGRESS NOTES
[address] Card[facility]  [address]  [facility], [address] [facility]  [phone]    Cardiology Follow up    Patient:  Juanjose Frederick  :  1960  MRN:  [id number]    History of Present Illness:     57-year-old female with past medical history of diabetes, hypertension, recently diagnosed reportedly mild sleep apnea, remote history of seizures, presents for follow-up  She was recently in the hospital for pneumonia and is recovered from this  She does feel somewhat lethargic since leaving the hospital       For few months she has had dyspnea on exertion without chest pain  She notes no palpitations, dizziness, or syncope  Patient Active Problem List   Diagnosis    Diabetes mellitus ([facility] )    Hypertension    Seizures ([facility] )    Pneumonia    Dyspnea on exertion       Past Surgical History  Past Surgical History:   Procedure Laterality Date    HYSTERECTOMY         Social History   Social History     Social History    Marital status: /Civil Union     Spouse name: N/A    Number of children: N/A    Years of education: N/A     Occupational History    Not on file  Social History Main Topics    Smoking status: Never Smoker    Smokeless tobacco: Never Used    Alcohol use No    Drug use: No    Sexual activity: Not on file     Other Topics Concern    Not on file     Social History Narrative    No narrative on file        Allergies   Allergen Reactions    Aspirin      Pt cannot specify if she has an ASA allergy; pt is on ASA 81 mg daily       Family History   No family history on file  Review of Systems:  Review of Systems   Constitutional: Positive for fatigue  Negative for chills and fever  HENT: Negative for hearing loss, nosebleeds and tinnitus  Eyes: Negative for pain  Respiratory: Positive for shortness of breath  Negative for cough and chest tightness  Cardiovascular: Negative for chest pain, palpitations and leg swelling     Gastrointestinal: Negative for abdominal pain, nausea and vomiting  Endocrine: Negative for cold intolerance and heat intolerance  Genitourinary: Negative for difficulty urinating, hematuria and vaginal bleeding  Musculoskeletal: Negative for arthralgias  Skin: Negative for rash  Allergic/Immunologic: Negative for environmental allergies  Neurological: Negative for dizziness, syncope, weakness and light-headedness  Psychiatric/Behavioral: Negative for decreased concentration and sleep disturbance  The patient is not nervous/anxious            Current Outpatient Prescriptions:     amLODIPine (NORVASC) 10 mg tablet, Take 1 tablet by mouth daily, Disp: , Rfl:     aspirin 81 MG tablet, Take 1 tablet by mouth daily, Disp: , Rfl:     atorvastatin (LIPITOR) 40 mg tablet, Take 40 mg by mouth daily  , Disp: , Rfl:     SARAH MICROLET LANCETS lancets, Inject 1 applicator into the skin 4 (four) times a day, Disp: , Rfl:     benzonatate (TESSALON PERLES) 100 mg capsule, Take 100 mg by mouth 3 (three) times a day as needed for cough, Disp: , Rfl:     Cholecalciferol (VITAMIN D3) 2000 units capsule, Take by mouth, Disp: , Rfl:     glucose blood (SARAH CONTOUR NEXT TEST) test strip, 1 applicator by In Vitro route 4 (four) times a day, Disp: , Rfl:     Insulin Glargine (LANTUS SOLOSTAR) 100 UNIT/ML SOPN, Inject 35 Units under the skin daily at bedtime  , Disp: , Rfl:     insulin lispro (HUMALOG KWIKPEN) 100 Units/mL SOPN, Inject under the skin 3 (three) times a day with meals  , Disp: , Rfl:     Insulin Pen Needle (BD PEN NEEDLE DERRICK U/F) 32G X 4 MM MISC, Inject 1 applicator under the skin 4 (four) times a day, Disp: , Rfl:     labetalol (NORMODYNE) 200 mg tablet, Take 200 mg by mouth 2 (two) times a day, Disp: , Rfl:     metFORMIN (GLUCOPHAGE) 500 mg tablet, Take 500 mg by mouth 2 (two) times a day with meals  , Disp: , Rfl:     valsartan-hydrochlorothiazide (DIOVAN-HCT) 320-25 MG per tablet, Take 1 tablet by mouth daily, Disp: , Rfl:   fluticasone (FLONASE) 50 mcg/act nasal spray, INSTILL 1 SPRAY INTO EACH NOSTRIL ONCE DAILY FOR 2 WEEKS, Disp: , Rfl: 0    MAPAP 500 MG tablet, take 1-2 tablet by mouth every 6 hours if needed for pain or fever, Disp: , Rfl: 0    RA SALINE NASAL SPRAY 0 65 % nasal spray, INSTILL 1 TO 2 SPRAYS INTO EACH NOSTRIL EVERY HOUR AS NEEDED FOR NASAL DRYNESS OR SINUS CONGESTION, Disp: , Rfl: 0     Physical Exam:    Vitals:    02/13/18 0859   BP: 122/72   BP Location: Left arm   Patient Position: Sitting   Cuff Size: Large   Pulse: 92   Resp: 20   Weight: (!) 138 kg (304 lb)   Height: 5' 8" (1 727 m)       Physical Exam   Constitutional: She is oriented to person, place, and time  She appears well-developed and well-nourished  HENT:   Head: Normocephalic  Right Ear: External ear normal    Left Ear: External ear normal    Mouth/Throat: Oropharynx is clear and moist    Eyes: Pupils are equal, round, and reactive to light  Neck: No JVD present  Carotid bruit is not present  Cardiovascular: Normal rate, regular rhythm and intact distal pulses  Exam reveals no gallop and no friction rub  No murmur heard  Pulmonary/Chest: Effort normal and breath sounds normal  No tachypnea  No respiratory distress  She has no wheezes  She has no rales  She exhibits no tenderness  Abdominal: Soft  She exhibits no distension  There is no tenderness  There is no rebound and no guarding  Musculoskeletal: She exhibits no edema  Neurological: She is alert and oriented to person, place, and time  Skin: Skin is warm and dry  Psychiatric: She has a normal mood and affect  Her behavior is normal  Judgment and thought content normal    Nursing note and vitals reviewed  Labs:not applicable    Assessment/Plan:    1  Dyspnea on exertion- her stress test was mildly positive though this may be a false positive    I would like to get a dobutamine stress echocardiogram to make sure that there is not any significant ischemia that could be contributing  It is possible that her dyspnea on exertion is multifactorial with potentially a component of obesity  I did see that her thyroid function was ordered for December but I am not sure if this was been done  I have given her a referral to Pulmonary as I think this may be helpful given her significant dyspnea on exertion  2  Hypertension - this is currently controlled  3  Enlarged pulmonary artery as well as ascending aorta -will follow up on this periodically  We will continued to have good blood pressure control - I also  mentioned to her that it will be good to start on treatment for sleep apnea to keep her pulmonary pressures down given her enlarged pulmonary artery  We will see her back in about 2 months            Kaitlyn Tomas MD  2/13/2018  9:50 AM

## 2018-02-14 ENCOUNTER — OFFICE VISIT (OUTPATIENT)
Dept: INTERNAL MEDICINE CLINIC | Facility: CLINIC | Age: 58
End: 2018-02-14
Payer: MEDICARE

## 2018-02-14 VITALS
WEIGHT: 293 LBS | BODY MASS INDEX: 44.41 KG/M2 | HEART RATE: 72 BPM | TEMPERATURE: 97.9 F | HEIGHT: 68 IN | SYSTOLIC BLOOD PRESSURE: 140 MMHG | DIASTOLIC BLOOD PRESSURE: 88 MMHG

## 2018-02-14 DIAGNOSIS — Z12.39 SCREENING FOR BREAST CANCER: ICD-10-CM

## 2018-02-14 DIAGNOSIS — J18.9 PNEUMONIA OF RIGHT MIDDLE LOBE DUE TO INFECTIOUS ORGANISM: ICD-10-CM

## 2018-02-14 DIAGNOSIS — Z23 NEED FOR IMMUNIZATION AGAINST INFLUENZA: Primary | ICD-10-CM

## 2018-02-14 PROBLEM — G47.33 MILD OBSTRUCTIVE SLEEP APNEA: Status: ACTIVE | Noted: 2017-12-18

## 2018-02-14 PROBLEM — R09.89 DECREASED PULSES IN FEET: Status: ACTIVE | Noted: 2017-12-11

## 2018-02-14 PROBLEM — R06.02 EXERCISE-INDUCED SHORTNESS OF BREATH: Status: ACTIVE | Noted: 2018-02-13

## 2018-02-14 PROBLEM — R94.31 PROLONGED QT INTERVAL: Status: ACTIVE | Noted: 2017-12-18

## 2018-02-14 PROCEDURE — 99496 TRANSJ CARE MGMT HIGH F2F 7D: CPT | Performed by: NURSE PRACTITIONER

## 2018-02-14 NOTE — PATIENT INSTRUCTIONS
Bacterial Pneumonia   AMBULATORY CARE:   Bacterial pneumonia  is a lung infection caused by bacteria  Your lungs become inflamed and cannot work well  Bacterial pneumonia germs are easily spread when an infected person coughs, sneezes, or has close contact with others  Common symptoms include the following:   · Dry cough or coughing up mucus, which may be streaked with blood    · Fever or chills    · Shortness of breath, wheezing, or chest pain    · Feeling tired easily    · Fast heartbeat    · Headache, muscle pain, or abdominal pain or discomfort    · Trouble thinking clearly  Seek care immediately if:   · You are confused and cannot think clearly  · You are urinating less or not at all  · You cough up blood  · You have more trouble breathing, or your breathing seems faster than normal     · Your heart or pulse beats more than 100 times in 1 minute  · Your lips or fingernails turn blue  Contact your healthcare provider if:   · Your symptoms are the same or get worse 48 hours after you start antibiotics  · You cannot eat or have loss of appetite, nausea, or are vomiting  · You have questions or concerns about your condition or care  Treatment  depends on what caused your bacterial pneumonia and how bad your symptoms are  You may need any of the following:  · Antibiotics  help treat a bacterial infection  · Acetaminophen  decreases pain and fever  It is available without a doctor's order  Ask how much to take and how often to take it  Follow directions  Read the labels of all other medicines you are using to see if they also contain acetaminophen, or ask your doctor or pharmacist  Acetaminophen can cause liver damage if not taken correctly  Do not use more than 4 grams (4,000 milligrams) total of acetaminophen in one day  · NSAIDs , such as ibuprofen, help decrease swelling, pain, and fever  This medicine is available with or without a doctor's order   NSAIDs can cause stomach bleeding or kidney problems in certain people  If you take blood thinner medicine, always ask your healthcare provider if NSAIDs are safe for you  Always read the medicine label and follow directions  · Airway clearance techniques  are exercises to help remove mucus so you can breathe more easily  Your healthcare provider will show you how to do the exercises  These exercises may be used along with machines or devices to help decrease your symptoms  · Respiratory support  is given to help you breathe  You may receive oxygen to increase the level of oxygen in your blood  You may also need a machine to help you breathe  Manage your symptoms:   · Rest as needed  Rest often while you recover  Slowly start to do more each day  · Drink liquids as directed  Ask how much liquid to drink each day and which liquids are best for you  Liquids help thin your mucus, which may make it easier for you to cough it up  · Do not smoke  Avoid secondhand smoke  Smoking increases your risk for pneumonia  Smoking also makes it harder for you to get better after you have had pneumonia  Ask your healthcare provider for information if you currently smoke and need help to quit  E-cigarettes or smokeless tobacco still contain nicotine  Talk to your healthcare provider before you use these products  · Use a cool mist humidifier  to increase air moisture in your home  This may make it easier for you to breathe and help decrease your cough  · Keep your head elevated  You may be able to breathe better if you lie down with the head of your bed up  Prevent bacterial pneumonia:   · Prevent the spread of germs  Wash your hands often with soap and water  Use gel hand cleanser when there is no soap and water available  Do not touch your eyes, nose, or mouth unless you have washed your hands first  Cover your mouth when you cough  Cough into a tissue or your shirtsleeve so you do not spread germs from your hands   If you are sick, stay away from others as much as possible  · Limit alcohol  Women should limit alcohol to 1 drink a day  Men should limit alcohol to 2 drinks a day  A drink of alcohol is 12 ounces of beer, 5 ounces of wine, or 1½ ounces of liquor  · Ask about vaccines  You may need a vaccine to help prevent pneumonia  Get an influenza (flu) vaccine every year as soon as it becomes available  Follow up with your healthcare provider as directed:  Write down your questions so you remember to ask them during your visits  © 2017 [facility] Information is for End User's use only and may not be sold, redistributed or otherwise used for commercial purposes  All illustrations and images included in CareNotes® are the copyrighted property of [employer]  or Mac[employer] Hernández  The above information is an  only  It is not intended as medical advice for individual conditions or treatments  Talk to your doctor, nurse or pharmacist before following any medical regimen to see if it is safe and effective for you

## 2018-02-14 NOTE — PROGRESS NOTES
Assessment/Plan:     Diagnoses and all orders for this visit:    Screening for breast cancer  -     Mammo screening bilateral w cad; Future    Pneumonia of right middle lobe due to infectious organism [facility])        Subjective: Presents to the clinic for [facility]     Patient ID: Malachi Laureano is a 59 y o  female  HPI  Was admitted to the hospital for pneumonia on Jan 6th and discharged on January 8th, 2018  She was sent home with antibiotics cefdinir and says she finished it all  She denies coughing, fever, C P  abd pain, dizziness  N/V/D  However she has very feeling a lot of fatigue and says she is only at a 40% level of energy to her baseline  All meds updated  She has a Hx of DM, HTN, hyperlipidemia, and ongoing edema on the B/L Lower extremities  IN care of cardiology to r/o cardiac disease  BP slightly elevated today, but is above her baseline  The following portions of the patient's history were reviewed and updated as appropriate: allergies, current medications, past family history, past medical history, past social history, past surgical history and problem list     Review of Systems   Constitutional: Positive for activity change (decreased activity since being diagnosed with pneumonia) and fatigue  Negative for chills, fever and unexpected weight change  HENT: Negative for congestion, ear discharge, rhinorrhea, sinus pain, sneezing and sore throat  Respiratory: Positive for shortness of breath (with exertion)  Negative for cough, chest tightness and wheezing  Cardiovascular: Negative for chest pain and palpitations  Gastrointestinal: Negative for abdominal pain, diarrhea, nausea and vomiting  Endocrine: Negative for cold intolerance, polydipsia, polyphagia and polyuria  Objective:    Vitals:    02/14/18 1021   BP: 140/88   Pulse: 72   Temp: 97 9 °F (36 6 °C)        Physical Exam   Constitutional: She is oriented to person, place, and time   She appears well-developed and well-nourished  No distress  Cardiovascular: Normal rate, regular rhythm and normal heart sounds  No murmur heard  Pulmonary/Chest: Effort normal and breath sounds normal  No respiratory distress  She has no wheezes  Musculoskeletal: She exhibits edema (Mild edema on the B/L lower extremities, non pitting)  She exhibits no tenderness  Neurological: She is alert and oriented to person, place, and time  Skin: Skin is warm and dry  She is not diaphoretic  Psychiatric: She has a normal mood and affect   Her behavior is normal  Judgment and thought content normal

## 2018-02-14 NOTE — LETTER
February 14, 2018     Patient: Brayan Ayala   YOB: 1960   Date of Visit: 2/14/2018       To Whom it May Concern:    nAita Sauceda is under my professional care  She was seen in my office on 2/14/2018  She may return to work on 02/17/2018  If you have any questions or concerns, please don't hesitate to call           Sincerely,          CONCETTA Reece        CC: No Recipients

## 2018-02-26 NOTE — PROGRESS NOTES
LMOM TO REMIND Pt  TO MAKE SURE SHE SCHEDULES APPT   FOR MAMMO THAT WAS ORDERED BY PAULINO Stoddard  ON 2/14/18

## 2018-03-22 ENCOUNTER — OFFICE VISIT (OUTPATIENT)
Dept: PULMONOLOGY | Facility: CLINIC | Age: 58
End: 2018-03-22
Payer: MEDICARE

## 2018-03-22 VITALS
RESPIRATION RATE: 18 BRPM | HEART RATE: 78 BPM | WEIGHT: 293 LBS | SYSTOLIC BLOOD PRESSURE: 144 MMHG | BODY MASS INDEX: 43.4 KG/M2 | OXYGEN SATURATION: 94 % | DIASTOLIC BLOOD PRESSURE: 80 MMHG | HEIGHT: 69 IN

## 2018-03-22 DIAGNOSIS — R94.39 ABNORMAL STRESS TEST: ICD-10-CM

## 2018-03-22 DIAGNOSIS — IMO0001 CLASS 3 OBESITY WITHOUT SERIOUS COMORBIDITY WITH BODY MASS INDEX (BMI) OF 45.0 TO 49.9 IN ADULT, UNSPECIFIED OBESITY TYPE: ICD-10-CM

## 2018-03-22 DIAGNOSIS — R06.00 DYSPNEA ON EXERTION: ICD-10-CM

## 2018-03-22 DIAGNOSIS — R91.8 LUNG NODULES: ICD-10-CM

## 2018-03-22 DIAGNOSIS — R06.02 SHORTNESS OF BREATH: Primary | ICD-10-CM

## 2018-03-22 DIAGNOSIS — I28.8 ENLARGED PULMONARY ARTERY (HCC): ICD-10-CM

## 2018-03-22 DIAGNOSIS — G47.33 MILD OBSTRUCTIVE SLEEP APNEA: ICD-10-CM

## 2018-03-22 PROBLEM — J18.9 PNEUMONIA DUE TO INFECTIOUS ORGANISM: Status: RESOLVED | Noted: 2018-02-06 | Resolved: 2018-03-22

## 2018-03-22 PROCEDURE — 99204 OFFICE O/P NEW MOD 45 MIN: CPT | Performed by: INTERNAL MEDICINE

## 2018-03-22 RX ORDER — ALBUTEROL SULFATE 90 UG/1
2 AEROSOL, METERED RESPIRATORY (INHALATION) EVERY 6 HOURS PRN
Qty: 1 INHALER | Refills: 6 | Status: SHIPPED | OUTPATIENT
Start: 2018-03-22 | End: 2018-06-24

## 2018-03-22 RX ORDER — SPIRONOLACTONE 25 MG/1
25 TABLET ORAL DAILY
Refills: 1 | COMMUNITY
Start: 2018-03-15 | End: 2018-06-12 | Stop reason: SDUPTHER

## 2018-03-22 NOTE — ASSESSMENT & PLAN NOTE
Patient has tiny lung nodules less than 4 mm, no smoking history and no history of lung cancer in her family  Patient is low risk for lung cancer  No further follow-up needed according to the guidelines  Explained to patient and she understands

## 2018-03-22 NOTE — ASSESSMENT & PLAN NOTE
I believe this is multifactorial including:  Obesity, deconditioning, mild diastolic CHF, and possibly although less likely to cause her shortness of breath is mild intermittent asthma that she was diagnosed in the past but she denies  I encouraged patient to try to exercise more and watch her calorie intake to lose weight, she may benefit from low-dose Lasix given her diastolic dysfunction and trace edema in her lower extremities but I will defer this decision to cardiology  Also patient had borderline abnormality in her nuclear stress test and she is due for repeat stress test to rule out any cardiac ischemia  I will prescribe p r n  albuterol to use as needed and will send patient for PFTs  I expect that PFTs were reflect restrictive pattern due to obesity

## 2018-03-22 NOTE — PROGRESS NOTES
Consultation - Pulmonary Medicine   Jose Luis Garibay 59 y o  female MRN: [id number]        Physician Requesting Consult: Giovany Mccann MD  Reason for Consult: SOB    Enlarged pulmonary artery ([address] )  No signs of pulmonary hypertension on echocardiogram, I do not believe patient has pulmonary hypertension or she may have very mild secondary to mild obstructive sleep apnea and mild diastolic CHF, no further intervention needed at this point    Mild obstructive sleep apnea  Patient will follow up with Santi Boss, her sleep specialist to go over the results of her sleep study and possibly get a CPAP  Patient states that she is thinking about it  Shortness of breath  I believe this is multifactorial including:  Obesity, deconditioning, mild diastolic CHF, and possibly although less likely to cause her shortness of breath is mild intermittent asthma that she was diagnosed in the past but she denies  I encouraged patient to try to exercise more and watch her calorie intake to lose weight, she may benefit from low-dose Lasix given her diastolic dysfunction and trace edema in her lower extremities but I will defer this decision to cardiology  Also patient had borderline abnormality in her nuclear stress test and she is due for repeat stress test to rule out any cardiac ischemia  I will prescribe p r n  albuterol to use as needed and will send patient for PFTs  I expect that PFTs were reflect restrictive pattern due to obesity  Obesity  Again I spoke to patient about decreasing calorie intake and exercising more to lose weight  She is thinking to join a gym to start exercising  Lung nodules  Patient has tiny lung nodules less than 4 mm, no smoking history and no history of lung cancer in her family  Patient is low risk for lung cancer  No further follow-up needed according to the guidelines  Explained to patient and she understands      Patient is in the process of seeing a new primary care physician at this point   ______________________________________________________________________    HPI:    Hernandez Phoenix is a 59 y o  female who presents with dyspnea on exertion started about 6 months ago  Patient states that she has only dyspnea on exertion with walking up hill but denies any shortness of breath walking on a flat surface or even climbing steps, she denies any associated chest pain or wheezing or cough, denies any fever or chills, she had weight gain in the past several months, she has also legs edema specially in her ankles and feet, denies orthopnea, denies any acid reflux or nausea or vomiting, denies any postnasal drips or allergy  Patient had a cold followed with increased productive cough few weeks ago and was admitted to the hospital with pneumonia and treated with antibiotics and improved, she states that her cough improved and at that time she had minimal wheezing that improved as well  Currently denies wheezing or cough  Patient used to live in the [address] and used to have nasal allergy associated with intermittent wheezing and was told that she has asthma and used to be on p r n  albuterol but her symptoms improved since she moved to the area and currently she has mild allergy in the fold time  She does not use her albuterol anymore  Never been on maintenance inhaled corticosteroids or other inhalers  And in the past she rarely needed her albuterol  Her son has asthma  She lives at home alone, she was told that she snores and she has excessive daytime sleepiness and fatigue  She had home sleep study in January  She never smoked cigarettes  She has no occupational exposure, 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   She never smoked cigarettes  She has no pets at home  Review of Systems:  Review of Systems   Constitutional: Negative  HENT: Negative  Eyes: Negative  Respiratory:        As HPI   Cardiovascular: Negative  Gastrointestinal: Negative  Endocrine: Negative  Genitourinary: Negative  Musculoskeletal: Negative  Skin: Negative  Allergic/Immunologic: Negative  Neurological: Negative  Hematological: Negative  Psychiatric/Behavioral: Negative  Historical Information   Past Medical History:   Diagnosis Date    Diabetes mellitus ([address] )     Hypertension     Seizures (HCC)      Past Surgical History:   Procedure Laterality Date    HYSTERECTOMY       Social History   History   Smoking Status    Never Smoker   Smokeless Tobacco    Never Used       Occupational history:  No occupational exposure    Family History:   Family History   Problem Relation Age of Onset    Heart attack Mother     No Known Problems Father     No Known Problems Sister     No Known Problems Brother     No Known Problems Son     No Known Problems Daughter      No pertinent family Hx except with her son has asthma   no family history of lung cancer        PhysicalExamination:  Vitals:   /80   Pulse 78   Resp 18   Ht 5' 8 5" (1 74 m)   Wt (!) 136 kg (300 lb 12 8 oz)   SpO2 94%   BMI 45 07 kg/m²      General: alert, not in acute distress  HEENT: PERRL, no icteric sclera or cyanosis, no thrush  Neck:  Supple, no lymphadenopathy or thyromegaly, no JVD  Lungs:  Equal breath sounds and clear auscultations bilaterally, no wheezing or crackles  Heart: S1S2 regular, no murmures or gallops  Abdomen: soft, non-tender, bowel sounds  present  Extrimities:  Trace lower extremities edema, no clubbing or cyanosis  Neuro: Alert and oriented x 3, no focal neurodeficits   Skin: intact, no rashes      Diagnostic Data:  Labs:   I personally reviewed the most recent laboratory data pertinent to today's visit    Lab Results   Component Value Date    WBC 7 86 02/07/2018    HGB 11 0 (L) 02/07/2018    HCT 34 9 02/07/2018    MCV 77 (L) 02/07/2018     02/07/2018     Lab Results   Component Value Date    GLUCOSE 235 (H) 02/07/2018    CALCIUM 8 6 02/07/2018     02/07/2018    K 3 5 02/07/2018    CO2 29 02/07/2018     02/07/2018    BUN 21 02/07/2018    CREATININE 0 96 02/07/2018     No results found for: IGE  Lab Results   Component Value Date    ALT 23 02/07/2018    AST 17 02/07/2018    ALKPHOS 129 (H) 02/07/2018    BILITOT 0 71 02/07/2018       Imaging:  I personally reviewed the images on the [facility] system pertinent to today's visit   chest x-ray reviewed on PACs: Clear lungs   chest CT scan reviewed on PACS from recent admission in February:  Mild right middle lobe infiltrates, tiny lung nodules largest 1 is 4 mm    Other studies:   echocardiogram from January 2018: LVEF 92-41%, grade 1 diastolic dysfunction, normal RV, no signs of pulmonary hypertension     nuclear stress test from January 2018 showed mild abnormalities with wall motion abnormalities     home sleep study in January 2018: Mild obstructive sleep apnea      Kaiden Mcclendon MD

## 2018-03-22 NOTE — ASSESSMENT & PLAN NOTE
No signs of pulmonary hypertension on echocardiogram, I do not believe patient has pulmonary hypertension or she may have very mild secondary to mild obstructive sleep apnea and mild diastolic CHF, no further intervention needed at this point

## 2018-03-22 NOTE — ASSESSMENT & PLAN NOTE
Again I spoke to patient about decreasing calorie intake and exercising more to lose weight  She is thinking to join a gym to start exercising

## 2018-03-22 NOTE — ASSESSMENT & PLAN NOTE
Patient will follow up with Leatha Medina, her sleep specialist to go over the results of her sleep study and possibly get a CPAP  Patient states that she is thinking about it

## 2018-03-28 ENCOUNTER — OFFICE VISIT (OUTPATIENT)
Dept: ENDOCRINOLOGY | Facility: CLINIC | Age: 58
End: 2018-03-28
Payer: COMMERCIAL

## 2018-03-28 VITALS
BODY MASS INDEX: 44.41 KG/M2 | HEART RATE: 76 BPM | DIASTOLIC BLOOD PRESSURE: 90 MMHG | SYSTOLIC BLOOD PRESSURE: 152 MMHG | HEIGHT: 68 IN | WEIGHT: 293 LBS

## 2018-03-28 DIAGNOSIS — E78.5 HYPERLIPIDEMIA, UNSPECIFIED HYPERLIPIDEMIA TYPE: ICD-10-CM

## 2018-03-28 DIAGNOSIS — R91.8 LUNG NODULES: ICD-10-CM

## 2018-03-28 DIAGNOSIS — R09.89 DECREASED PULSES IN FEET: ICD-10-CM

## 2018-03-28 DIAGNOSIS — IMO0002 UNCONTROLLED TYPE 2 DIABETES MELLITUS WITH OTHER OPHTHALMIC COMPLICATION, WITH LONG-TERM CURRENT USE OF INSULIN: Primary | ICD-10-CM

## 2018-03-28 DIAGNOSIS — IMO0001 CLASS 3 OBESITY WITHOUT SERIOUS COMORBIDITY WITH BODY MASS INDEX (BMI) OF 45.0 TO 49.9 IN ADULT, UNSPECIFIED OBESITY TYPE: ICD-10-CM

## 2018-03-28 DIAGNOSIS — E16.2 HYPOGLYCEMIA: ICD-10-CM

## 2018-03-28 DIAGNOSIS — R80.9 MICROALBUMINURIA: ICD-10-CM

## 2018-03-28 DIAGNOSIS — I10 ESSENTIAL HYPERTENSION: ICD-10-CM

## 2018-03-28 DIAGNOSIS — E78.5 DYSLIPIDEMIA: ICD-10-CM

## 2018-03-28 DIAGNOSIS — E55.9 VITAMIN D DEFICIENCY: ICD-10-CM

## 2018-03-28 PROCEDURE — 3066F NEPHROPATHY DOC TX: CPT | Performed by: PHYSICIAN ASSISTANT

## 2018-03-28 PROCEDURE — 99215 OFFICE O/P EST HI 40 MIN: CPT | Performed by: PHYSICIAN ASSISTANT

## 2018-03-28 NOTE — PROGRESS NOTES
Established Patient Progress Note      Chief Complaint   Patient presents with    Diabetes Type 2          History of Present Illness:   Brayan Ayala is a 59 y o  female with a history of type 2 diabetes with long term insulin use    Reports complications of proteinuria  Denies recent illness or hospitalizations  Denies recent severe hypoglycemic or severe hyperglycemic episodes  Denies any issues with her current regimen  home glucose monitoring: are performed regularly 1-2x per day  Blood sugars are high overall mostly 180s to 200s  This is an improvement as they were frequently over 300s a few months back  Workup for dyspnea in progress with cardiology and pulmonary  Biggest complaint is fatigue, dyspnea on exertion, and heaviness in legs         Current regimen:   Metformin 1000mg two tabs with dinner   Humalog 10-6-14 before meals   Plus Sliding scale   Lantus 35 at bedtime    Last Eye Exam: 12/2017  Last Foot Exam: at previous visit 12/2017    Has hypertension: Taking Diovan HCT, aldactone 25mg daily, labetalol 200mg twice per day, amlodipine 10mg daily  Has hyperlipidemia: Taking atorvastatin    Thyroid disorders: labs pending  Vitamin D Deficinecy- taking supplements    Patient Active Problem List   Diagnosis    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, with long-term current use of insulin (HCC)    Hypertension    Seizures (HCC)    Shortness of breath    Enlarged pulmonary artery ([address] )    Aortic dilatation (HCC)    Anemia    Decreased pulses in feet    Diabetes mellitus type 2 with complications, uncontrolled (HCC)    Dyslipidemia    Fatigue    Hyperlipidemia    Hypoglycemia    Microalbuminuria    Mild obstructive sleep apnea    Obesity    Peripheral neuropathy    Prolonged QT interval    Scalp avulsion    Visual impairment in both eyes    Vitamin D deficiency    Lung nodules      Past Medical History:   Diagnosis Date    Diabetes mellitus ([address] )  Hypertension     Seizures ([address] )       Past Surgical History:   Procedure Laterality Date    HYSTERECTOMY        Family History   Problem Relation Age of Onset    Heart attack Mother     No Known Problems Father     No Known Problems Sister     No Known Problems Brother     No Known Problems Son     No Known Problems Daughter      Social History   Substance Use Topics    Smoking status: Never Smoker    Smokeless tobacco: Never Used    Alcohol use No     Allergies   Allergen Reactions    Aspirin      Pt cannot specify if she has an ASA allergy; pt is on ASA 81 mg daily         Current Outpatient Prescriptions:     albuterol (VENTOLIN HFA) 90 mcg/act inhaler, Inhale 2 puffs every 6 (six) hours as needed for wheezing or shortness of breath, Disp: 1 Inhaler, Rfl: 6    amLODIPine (NORVASC) 10 mg tablet, Take 1 tablet by mouth daily, Disp: , Rfl:     aspirin 81 MG tablet, Take 1 tablet by mouth daily, Disp: , Rfl:     atorvastatin (LIPITOR) 40 mg tablet, Take 40 mg by mouth daily  , Disp: , Rfl:     SARAH CONTOUR NEXT TEST test strip, 1 each by Other route 4 (four) times a day for 90 days DX E11 9, Disp: 400 each, Rfl: 1    SARAH MICROLET LANCETS lancets, Inject 1 applicator into the skin 4 (four) times a day, Disp: , Rfl:     Cholecalciferol (VITAMIN D3) 2000 units capsule, Take by mouth, Disp: , Rfl:     insulin glargine (LANTUS SOLOSTAR) injection pen 100 units/mL, Inject 0 4 mL (40 Units total) under the skin daily at bedtime, Disp: 5 pen, Rfl: 0    insulin lispro (HUMALOG KWIKPEN) 100 Units/mL SOPN, Inject 10-18 Units under the skin 3 (three) times a day with meals 14 before breakfast 10 before lunch 18 before dinner plus sliding scale, Disp: 5 pen, Rfl: 0    Insulin Pen Needle (BD PEN NEEDLE DERRICK U/F) 32G X 4 MM MISC, Inject 1 applicator under the skin 4 (four) times a day, Disp: , Rfl:     labetalol (NORMODYNE) 200 mg tablet, Take 200 mg by mouth 2 (two) times a day, Disp: , Rfl:    metFORMIN (GLUCOPHAGE) 500 mg tablet, Take 500 mg by mouth 2 (two) times a day with meals  , Disp: , Rfl:     spironolactone (ALDACTONE) 25 mg tablet, Take 25 mg by mouth daily, Disp: , Rfl: 1    valsartan-hydrochlorothiazide (DIOVAN-HCT) 320-25 MG per tablet, Take 1 tablet by mouth daily, Disp: , Rfl:     benzonatate (TESSALON PERLES) 100 mg capsule, Take 100 mg by mouth 3 (three) times a day as needed for cough, Disp: , Rfl:     fluticasone (FLONASE) 50 mcg/act nasal spray, INSTILL 1 SPRAY INTO EACH NOSTRIL ONCE DAILY FOR 2 WEEKS, Disp: , Rfl: 0    MAPAP 500 MG tablet, take 1-2 tablet by mouth every 6 hours if needed for pain or fever, Disp: , Rfl: 0    RA SALINE NASAL SPRAY 0 65 % nasal spray, INSTILL 1 TO 2 SPRAYS INTO EACH NOSTRIL EVERY HOUR AS NEEDED FOR NASAL DRYNESS OR SINUS CONGESTION, Disp: , Rfl: 0    Review of Systems   Constitutional: Negative for activity change, appetite change, chills, diaphoresis, fatigue, fever and unexpected weight change  HENT: Negative for trouble swallowing and voice change  Eyes: Negative for visual disturbance  Respiratory: Negative for shortness of breath  Cardiovascular: Negative for chest pain and palpitations  Gastrointestinal: Negative for abdominal pain, constipation and diarrhea  Endocrine: Negative for cold intolerance, heat intolerance, polydipsia, polyphagia and polyuria  Genitourinary: Negative for frequency and menstrual problem  Musculoskeletal: Negative for arthralgias and myalgias  Skin: Negative for rash  Allergic/Immunologic: Negative for food allergies  Neurological: Negative for dizziness and tremors  Hematological: Negative for adenopathy  Psychiatric/Behavioral: Negative for sleep disturbance  All other systems reviewed and are negative  Physical Exam:  Body mass index is 46 53 kg/m²    /90   Pulse 76   Ht 5' 8" (1 727 m)   Wt (!) 139 kg (306 lb)   BMI 46 53 kg/m²    Wt Readings from Last 3 Encounters: 03/28/18 (!) 139 kg (306 lb)   03/22/18 (!) 136 kg (300 lb 12 8 oz)   02/14/18 (!) 143 kg (315 lb 11 2 oz)       Physical Exam   Constitutional: She is oriented to person, place, and time  She appears well-developed and well-nourished  No distress  HENT:   Head: Normocephalic and atraumatic  Eyes: Conjunctivae are normal  Pupils are equal, round, and reactive to light  Neck: Normal range of motion  Neck supple  No thyromegaly present  Cardiovascular: Normal rate, regular rhythm and normal heart sounds  Pulmonary/Chest: Effort normal and breath sounds normal  No respiratory distress  She has no wheezes  She has no rales  Abdominal: Soft  Bowel sounds are normal  She exhibits no distension  There is no tenderness  Musculoskeletal: Normal range of motion  She exhibits no edema  Neurological: She is alert and oriented to person, place, and time  Skin: Skin is warm and dry  Psychiatric: She has a normal mood and affect  Vitals reviewed      Diabetic Foot Exam    Labs:     Lab Results   Component Value Date    HGBA1C 8 2 (H) 09/20/2017     No results found for: GLU  Lab Results   Component Value Date    HGBA1C 8 2 (H) 09/20/2017       Lab Results   Component Value Date    CREATININE 0 96 02/07/2018    CREATININE 1 16 02/06/2018    CREATININE 0 99 10/20/2017    BUN 21 02/07/2018     02/07/2018    K 3 5 02/07/2018     02/07/2018    CO2 29 02/07/2018     eGFR   Date Value Ref Range Status   02/07/2018 75 ml/min/1 73sq m Final       Lab Results   Component Value Date    CHOL 140 09/20/2017    HDL 46 09/20/2017    TRIG 74 09/20/2017       Lab Results   Component Value Date    ALT 23 02/07/2018    AST 17 02/07/2018    ALKPHOS 129 (H) 02/07/2018    BILITOT 0 71 02/07/2018       Lab Results   Component Value Date    ZHA9CQSRWVCR 1 850 09/08/2016    EER0KUEPLSMP 1 363 07/14/2015     Lab Results   Component Value Date    FREET4 1 13 09/08/2016       Impression & Plan:    Problem List Items Addressed This Visit     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, with long-term current use of insulin ([address] ) - Primary     Poorly controlled/not at goal  Based on review of meter, all readings are high  Increase Lantus to 40 units daily and Humalog to 14-10-18 before meals plus sliding scale  Check BG 4x per day and send log to office in two weeks  Complete labs ASAP  Relevant Medications    SARAH CONTOUR NEXT TEST test strip    insulin glargine (LANTUS SOLOSTAR) injection pen 100 units/mL    insulin lispro (HUMALOG KWIKPEN) 100 Units/mL SOPN    Other Relevant Orders    HEMOGLOBIN A1C W/ EAG ESTIMATION    Microalbumin / creatinine urine ratio    VAS lower limb arterial duplex, complete bilateral    Ambulatory referral to medical nutrition therapy for diabetes    Hypertension     Not at goal but improving-- continue regimen and will monitor and make adjustments if needed at next visit  Complete cardiac/pulmonary testing and sleep study as ordered and consider additional workup at next visit  Decreased pulses in feet     Due to decreased pluses and complaint of pain/heaviness in legs when walking re-ordered lower extremity arterial duplex testing  Dyslipidemia     Continue atorvastatin  Hyperlipidemia    Relevant Orders    TSH, 3rd generation    T4, free    Hypoglycemia     No hypoglycemia based on review of meter  Due to being on intensive insulin therapy with significant dose increases today, the patient is at high risk of severe hypoglycemia  Severe hypoglycemia can result in falls, injury, coma, seizure, or death  Need to monitor glucose as directed and provide us with info for additional adjustments              Microalbuminuria    Relevant Orders    Vitamin D 25 hydroxy    Obesity     Refer to dietician for Medical nutrition therapy          Vitamin D deficiency    Lung nodules          Orders Placed This Encounter   Procedures    HEMOGLOBIN A1C W/ EAG ESTIMATION    Microalbumin / creatinine urine ratio    TSH, 3rd generation     This is a patient instruction: This test is non-fasting  Please drink two glasses of water morning of bloodwork   T4, free    Vitamin D 25 hydroxy    Ambulatory referral to medical nutrition therapy for diabetes     Standing Status:   Future     Standing Expiration Date:   9/28/2018     Referral Priority:   Routine     Referral Type:   Consult - AMB     Referral Reason:   Specialty Services Required     Requested Specialty:   Endocrinology     Number of Visits Requested:   1     Expiration Date:   3/28/2019       Patient Instructions   Increase insulin:  Lantus 40 units daily at bedtime  Humalog 14 before breakfast, 10 before lunch, 18 before dinner PLUS sliding scale  Check Blood sugars 4x per day and send log to office in two weeks  Complete labs ASAP  Discussed with the patient and all questioned fully answered  She will call me if any problems arise  Follow-up appointment in 3 months       Counseled patient on diagnostic results, prognosis, risk and benefit of treatment options, instruction for management, importance of treatment compliance, Risk  factor reduction and impressions      Lesa Gowers, PA-C

## 2018-03-28 NOTE — PATIENT INSTRUCTIONS
Increase insulin:  Lantus 40 units daily at bedtime  Humalog 14 before breakfast, 10 before lunch, 18 before dinner PLUS sliding scale  Check Blood sugars 4x per day and send log to office in two weeks  Complete labs ASAP

## 2018-03-30 NOTE — ASSESSMENT & PLAN NOTE
Not at goal but improving-- continue regimen and will monitor and make adjustments if needed at next visit  Complete cardiac/pulmonary testing and sleep study as ordered and consider additional workup at next visit

## 2018-03-30 NOTE — ASSESSMENT & PLAN NOTE
No hypoglycemia based on review of meter  Due to being on intensive insulin therapy with significant dose increases today, the patient is at high risk of severe hypoglycemia  Severe hypoglycemia can result in falls, injury, coma, seizure, or death  Need to monitor glucose as directed and provide us with info for additional adjustments

## 2018-03-30 NOTE — ASSESSMENT & PLAN NOTE
Due to decreased pluses and complaint of pain/heaviness in legs when walking re-ordered lower extremity arterial duplex testing

## 2018-03-30 NOTE — ASSESSMENT & PLAN NOTE
Poorly controlled/not at goal  Based on review of meter, all readings are high  Increase Lantus to 40 units daily and Humalog to 14-10-18 before meals plus sliding scale  Check BG 4x per day and send log to office in two weeks  Complete labs ASAP

## 2018-05-09 ENCOUNTER — TELEPHONE (OUTPATIENT)
Dept: INTERNAL MEDICINE CLINIC | Facility: CLINIC | Age: 58
End: 2018-05-09

## 2018-05-11 NOTE — TELEPHONE ENCOUNTER
I have written a temporary letter while waiting for the fax to come through central scheduling  Placed in red folder  Please fax to her [employer] so she is not cut off  Thanks   Dominique Green

## 2018-05-12 DIAGNOSIS — E11.65 TYPE 2 DIABETES MELLITUS WITH HYPERGLYCEMIA, WITH LONG-TERM CURRENT USE OF INSULIN (HCC): Primary | ICD-10-CM

## 2018-05-12 DIAGNOSIS — Z79.4 TYPE 2 DIABETES MELLITUS WITH HYPERGLYCEMIA, WITH LONG-TERM CURRENT USE OF INSULIN (HCC): Primary | ICD-10-CM

## 2018-05-14 ENCOUNTER — DOCUMENTATION (OUTPATIENT)
Dept: INTERNAL MEDICINE CLINIC | Facility: CLINIC | Age: 58
End: 2018-05-14

## 2018-05-14 ENCOUNTER — TELEPHONE (OUTPATIENT)
Dept: INTERNAL MEDICINE CLINIC | Facility: CLINIC | Age: 58
End: 2018-05-14

## 2018-05-14 ENCOUNTER — PATIENT OUTREACH (OUTPATIENT)
Dept: INTERNAL MEDICINE CLINIC | Facility: CLINIC | Age: 58
End: 2018-05-14

## 2018-05-14 NOTE — PROGRESS NOTES
MITZI received request from staff to assist with getting Provider letter faxed to [employer]  Confirmation received  Several other calls made and [employer] medical Certification form received  MITZI will ask Provider to complete and staff to fax it back to [employer] FAX # [phone]

## 2018-05-14 NOTE — TELEPHONE ENCOUNTER
Dear Mesha Cristina ,  The medical Certification form finally came for this pt I will place it in a red folder for you to complete and please have staff fax it in  [phone]  Thanks    Kirby Guy

## 2018-05-16 ENCOUNTER — TELEPHONE (OUTPATIENT)
Dept: INTERNAL MEDICINE CLINIC | Facility: CLINIC | Age: 58
End: 2018-05-16

## 2018-05-16 NOTE — TELEPHONE ENCOUNTER
As per Jennifer Zapata note medical certification form faxed in and sent to Central fax Team   Shut off notice also sent to [facility]  Confirmation received

## 2018-06-12 DIAGNOSIS — I10 HYPERTENSION, UNSPECIFIED TYPE: Primary | ICD-10-CM

## 2018-06-12 RX ORDER — SPIRONOLACTONE 25 MG/1
TABLET ORAL
Qty: 90 TABLET | Refills: 0 | Status: SHIPPED | OUTPATIENT
Start: 2018-06-12 | End: 2018-08-28 | Stop reason: SDUPTHER

## 2018-06-24 ENCOUNTER — APPOINTMENT (EMERGENCY)
Dept: RADIOLOGY | Facility: HOSPITAL | Age: 58
DRG: 305 | End: 2018-06-24
Payer: COMMERCIAL

## 2018-06-24 ENCOUNTER — HOSPITAL ENCOUNTER (INPATIENT)
Facility: HOSPITAL | Age: 58
LOS: 2 days | Discharge: HOME | DRG: 305 | End: 2018-06-26
Attending: EMERGENCY MEDICINE | Admitting: INTERNAL MEDICINE
Payer: COMMERCIAL

## 2018-06-24 DIAGNOSIS — I10 ESSENTIAL HYPERTENSION: ICD-10-CM

## 2018-06-24 DIAGNOSIS — R42 POSTURAL DIZZINESS WITH PRESYNCOPE: Primary | ICD-10-CM

## 2018-06-24 DIAGNOSIS — R55 POSTURAL DIZZINESS WITH PRESYNCOPE: Primary | ICD-10-CM

## 2018-06-24 DIAGNOSIS — IMO0002 UNCONTROLLED TYPE 2 DIABETES MELLITUS WITH OTHER OPHTHALMIC COMPLICATION, WITH LONG-TERM CURRENT USE OF INSULIN: ICD-10-CM

## 2018-06-24 DIAGNOSIS — R06.02 SHORTNESS OF BREATH: ICD-10-CM

## 2018-06-24 DIAGNOSIS — I16.0 HYPERTENSIVE URGENCY: ICD-10-CM

## 2018-06-24 LAB
ALBUMIN SERPL BCP-MCNC: 3.9 G/DL (ref 3–5.2)
ALP SERPL-CCNC: 132 U/L (ref 43–122)
ALT SERPL W P-5'-P-CCNC: 37 U/L (ref 9–52)
ANION GAP SERPL CALCULATED.3IONS-SCNC: 8 MMOL/L (ref 5–14)
AST SERPL W P-5'-P-CCNC: 22 U/L (ref 14–36)
BASOPHILS # BLD AUTO: 0.1 THOUSANDS/ΜL (ref 0–0.1)
BASOPHILS NFR BLD AUTO: 1 % (ref 0–1)
BILIRUB SERPL-MCNC: 0.8 MG/DL
BUN SERPL-MCNC: 24 MG/DL (ref 5–25)
CALCIUM SERPL-MCNC: 9.6 MG/DL (ref 8.4–10.2)
CHLORIDE SERPL-SCNC: 102 MMOL/L (ref 97–108)
CO2 SERPL-SCNC: 30 MMOL/L (ref 22–30)
CREAT SERPL-MCNC: 1.01 MG/DL (ref 0.6–1.2)
EOSINOPHIL # BLD AUTO: 0.2 THOUSAND/ΜL (ref 0–0.4)
EOSINOPHIL NFR BLD AUTO: 3 % (ref 0–6)
ERYTHROCYTE [DISTWIDTH] IN BLOOD BY AUTOMATED COUNT: 15.2 %
GFR SERPL CREATININE-BSD FRML MDRD: 70 ML/MIN/1.73SQ M
GLUCOSE SERPL-MCNC: 209 MG/DL (ref 70–99)
GLUCOSE SERPL-MCNC: 247 MG/DL (ref 70–99)
GLUCOSE SERPL-MCNC: 315 MG/DL (ref 70–99)
HCT VFR BLD AUTO: 37.7 % (ref 36–46)
HGB BLD-MCNC: 12.3 G/DL (ref 12–16)
LYMPHOCYTES # BLD AUTO: 2.1 THOUSANDS/ΜL (ref 0.5–4)
LYMPHOCYTES NFR BLD AUTO: 28 % (ref 20–50)
MAGNESIUM SERPL-MCNC: 2.1 MG/DL (ref 1.6–2.3)
MCH RBC QN AUTO: 25.4 PG (ref 26–34)
MCHC RBC AUTO-ENTMCNC: 32.6 G/DL (ref 31–36)
MCV RBC AUTO: 78 FL (ref 80–100)
MONOCYTES # BLD AUTO: 0.5 THOUSAND/ΜL (ref 0.2–0.9)
MONOCYTES NFR BLD AUTO: 6 % (ref 1–10)
NEUTROPHILS # BLD AUTO: 4.9 THOUSANDS/ΜL (ref 1.8–7.8)
NEUTS SEG NFR BLD AUTO: 63 % (ref 45–65)
NT-PROBNP SERPL-MCNC: 90.3 PG/ML (ref 0–299)
PLATELET # BLD AUTO: 308 THOUSANDS/UL (ref 150–450)
PLATELET BLD QL SMEAR: ADEQUATE
PMV BLD AUTO: 8.9 FL (ref 8.9–12.7)
POTASSIUM SERPL-SCNC: 3.8 MMOL/L (ref 3.6–5)
PROT SERPL-MCNC: 7.4 G/DL (ref 5.9–8.4)
RBC # BLD AUTO: 4.84 MILLION/UL (ref 4–5.2)
RBC MORPH BLD: NORMAL
SODIUM SERPL-SCNC: 140 MMOL/L (ref 137–147)
TROPONIN I SERPL-MCNC: <0.01 NG/ML (ref 0–0.03)
TSH SERPL DL<=0.05 MIU/L-ACNC: 1.68 UIU/ML (ref 0.47–4.68)
WBC # BLD AUTO: 7.8 THOUSAND/UL (ref 4.5–11)

## 2018-06-24 PROCEDURE — 85025 COMPLETE CBC W/AUTO DIFF WBC: CPT | Performed by: EMERGENCY MEDICINE

## 2018-06-24 PROCEDURE — 94640 AIRWAY INHALATION TREATMENT: CPT

## 2018-06-24 PROCEDURE — 36415 COLL VENOUS BLD VENIPUNCTURE: CPT | Performed by: EMERGENCY MEDICINE

## 2018-06-24 PROCEDURE — 84484 ASSAY OF TROPONIN QUANT: CPT | Performed by: EMERGENCY MEDICINE

## 2018-06-24 PROCEDURE — 71046 X-RAY EXAM CHEST 2 VIEWS: CPT

## 2018-06-24 PROCEDURE — 80053 COMPREHEN METABOLIC PANEL: CPT | Performed by: EMERGENCY MEDICINE

## 2018-06-24 PROCEDURE — 85379 FIBRIN DEGRADATION QUANT: CPT | Performed by: EMERGENCY MEDICINE

## 2018-06-24 PROCEDURE — 99285 EMERGENCY DEPT VISIT HI MDM: CPT

## 2018-06-24 PROCEDURE — 84443 ASSAY THYROID STIM HORMONE: CPT | Performed by: EMERGENCY MEDICINE

## 2018-06-24 PROCEDURE — 93005 ELECTROCARDIOGRAM TRACING: CPT

## 2018-06-24 PROCEDURE — 83735 ASSAY OF MAGNESIUM: CPT | Performed by: EMERGENCY MEDICINE

## 2018-06-24 PROCEDURE — 82948 REAGENT STRIP/BLOOD GLUCOSE: CPT

## 2018-06-24 PROCEDURE — 99223 1ST HOSP IP/OBS HIGH 75: CPT | Performed by: INTERNAL MEDICINE

## 2018-06-24 PROCEDURE — 83880 ASSAY OF NATRIURETIC PEPTIDE: CPT | Performed by: EMERGENCY MEDICINE

## 2018-06-24 RX ORDER — AMLODIPINE BESYLATE 5 MG/1
10 TABLET ORAL DAILY
Status: DISCONTINUED | OUTPATIENT
Start: 2018-06-25 | End: 2018-06-26 | Stop reason: HOSPADM

## 2018-06-24 RX ORDER — IPRATROPIUM BROMIDE AND ALBUTEROL SULFATE 2.5; .5 MG/3ML; MG/3ML
3 SOLUTION RESPIRATORY (INHALATION)
Status: DISCONTINUED | OUTPATIENT
Start: 2018-06-24 | End: 2018-06-24

## 2018-06-24 RX ORDER — INSULIN GLARGINE 100 [IU]/ML
30 INJECTION, SOLUTION SUBCUTANEOUS
Status: DISCONTINUED | OUTPATIENT
Start: 2018-06-24 | End: 2018-06-25

## 2018-06-24 RX ORDER — ATORVASTATIN CALCIUM 40 MG/1
40 TABLET, FILM COATED ORAL DAILY
Status: DISCONTINUED | OUTPATIENT
Start: 2018-06-24 | End: 2018-06-26 | Stop reason: HOSPADM

## 2018-06-24 RX ORDER — MELATONIN
1000 DAILY
Status: DISCONTINUED | OUTPATIENT
Start: 2018-06-25 | End: 2018-06-26 | Stop reason: HOSPADM

## 2018-06-24 RX ORDER — SPIRONOLACTONE 25 MG/1
25 TABLET ORAL DAILY
Status: DISCONTINUED | OUTPATIENT
Start: 2018-06-25 | End: 2018-06-26 | Stop reason: HOSPADM

## 2018-06-24 RX ORDER — LORAZEPAM 2 MG/ML
1 INJECTION INTRAMUSCULAR ONCE
Status: DISCONTINUED | OUTPATIENT
Start: 2018-06-24 | End: 2018-06-24

## 2018-06-24 RX ORDER — HEPARIN SODIUM 5000 [USP'U]/ML
5000 INJECTION, SOLUTION INTRAVENOUS; SUBCUTANEOUS EVERY 8 HOURS SCHEDULED
Status: DISCONTINUED | OUTPATIENT
Start: 2018-06-24 | End: 2018-06-26 | Stop reason: HOSPADM

## 2018-06-24 RX ORDER — 0.9 % SODIUM CHLORIDE 0.9 %
3 VIAL (ML) INJECTION AS NEEDED
Status: DISCONTINUED | OUTPATIENT
Start: 2018-06-24 | End: 2018-06-26 | Stop reason: HOSPADM

## 2018-06-24 RX ORDER — FLUTICASONE PROPIONATE 50 MCG
2 SPRAY, SUSPENSION (ML) NASAL 2 TIMES DAILY
Status: DISCONTINUED | OUTPATIENT
Start: 2018-06-24 | End: 2018-06-26 | Stop reason: HOSPADM

## 2018-06-24 RX ORDER — CANDESARTAN 16 MG/1
32 TABLET ORAL DAILY
Status: DISCONTINUED | OUTPATIENT
Start: 2018-06-25 | End: 2018-06-25

## 2018-06-24 RX ORDER — LABETALOL 100 MG/1
200 TABLET, FILM COATED ORAL 2 TIMES DAILY
Status: DISCONTINUED | OUTPATIENT
Start: 2018-06-24 | End: 2018-06-25

## 2018-06-24 RX ORDER — ASPIRIN 81 MG/1
81 TABLET ORAL DAILY
Status: DISCONTINUED | OUTPATIENT
Start: 2018-06-25 | End: 2018-06-26 | Stop reason: HOSPADM

## 2018-06-24 RX ORDER — HYDROCHLOROTHIAZIDE 25 MG/1
12.5 TABLET ORAL DAILY
Status: DISCONTINUED | OUTPATIENT
Start: 2018-06-25 | End: 2018-06-25

## 2018-06-24 RX ADMIN — INSULIN GLARGINE 30 UNITS: 100 INJECTION, SOLUTION SUBCUTANEOUS at 21:32

## 2018-06-24 RX ADMIN — IPRATROPIUM BROMIDE 0.5 MG: 0.5 SOLUTION RESPIRATORY (INHALATION) at 11:47

## 2018-06-24 RX ADMIN — ATORVASTATIN CALCIUM 40 MG: 40 TABLET, FILM COATED ORAL at 21:31

## 2018-06-24 RX ADMIN — ALBUTEROL SULFATE 2.5 MG: 2.5 SOLUTION RESPIRATORY (INHALATION) at 11:47

## 2018-06-24 RX ADMIN — NITROGLYCERIN 1 INCH: 20 OINTMENT TOPICAL at 11:46

## 2018-06-24 RX ADMIN — FLUTICASONE PROPIONATE 2 SPRAY: 50 SPRAY, METERED NASAL at 18:40

## 2018-06-24 RX ADMIN — Medication 0.5 MG: at 11:47

## 2018-06-24 RX ADMIN — Medication 2.5 MG: at 11:47

## 2018-06-24 RX ADMIN — INSULIN LISPRO 4 UNITS: 100 INJECTION, SOLUTION INTRAVENOUS; SUBCUTANEOUS at 18:39

## 2018-06-24 RX ADMIN — LABETALOL HYDROCHLORIDE 200 MG: 100 TABLET, FILM COATED ORAL at 18:43

## 2018-06-24 RX ADMIN — HEPARIN SODIUM 5000 UNITS: 5000 INJECTION, SOLUTION INTRAVENOUS; SUBCUTANEOUS at 21:32

## 2018-06-24 NOTE — ASSESSMENT & PLAN NOTE
Patient had SOB starting this morning   Most likely 2/2 HTN Urgency   - Patient does not need any supplemental O2   - No labored breathing   - D- Dimer pending, King Osorio will be calculated at that time

## 2018-06-24 NOTE — H&P
Progress Note - Kaitlynn Hampton 1960, 59 y o  female MRN: [id number]    Unit/Bed#: ED 05 Encounter: [id number]    Primary Care Provider: Grace Lucas MD   Date and time admitted to hospital: 6/24/2018 10:54 AM        * Hypertensive urgency   Assessment & Plan    Patient has Hx of poorly controlled HTN  - Came to ER with SOB and was found to have HTN Urgency   - Will place on observation   - Will order ECG stat as patient also C/O SOB and dizziness   - Restart patients BP medications with holding parameters  - Poor dieting will may have played factor will get Dietary Education        Shortness of breath   Assessment & Plan    Patient had SOB starting this morning   Most likely 2/2 HTN Urgency   - Patient does not need any supplemental O2   - No labored breathing   - D- Dimer pending, Michelle Alphonse will be calculated at that time        Hypertension   Assessment & Plan    As above        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, with long-term current use of insulin ([address] )   Assessment & Plan    Lab Results   Component Value Date    HGBA1C 8 2 (H) 09/20/2017       No results for input(s): POCGLU in the last 72 hours  Blood Sugar Average: Last 72 hrs: Will reduce patients Lantus to 30U qhs instead of 40 as she will be in the hospital ( controlled environment)   Will place on ISS and accucheck   - Consult Diabetic education   - Hba1c                  VTE Prophylaxis: Heparin  / sequential compression device   Code Status: FULL   POLST: POLST form is not discussed and not completed at this time  Discussion with family: N/A  Anticipated Length of Stay:  Patient will be admitted on an Inpatient basis with an anticipated length of stay of  Less than 2 midnights  Justification for Hospital Stay: Patient will be placed on Observation     Total Time for Visit, including Counseling / Coordination of Care: 45 minutes    Greater than 50% of this total time spent on direct patient counseling and coordination of care  Chief Complaint:   SOB    History of Present Illness:    Aj Araujo is a 59 y o  female who presents with SOB, that worsened this morning  Patient has been experiencing SOB for several months however she had worsening SOB this morning  Patient was working around the house when she started to feel dizzy and had blurry vision during this episode  This episode lasted > 5 mins  No alleviating factors, No exacerbating factors  Patient say that this happens sometimes  Denies chest pain, ABD pain, no LE tenderness, N/V  Review of Systems:    Review of Systems   Constitutional: Positive for fatigue  Negative for activity change, appetite change and fever  HENT: Negative for congestion, sore throat and trouble swallowing  Eyes: Negative for pain, discharge and visual disturbance  Respiratory: Positive for shortness of breath  Negative for apnea, chest tightness and wheezing  Cardiovascular: Negative for chest pain, palpitations and leg swelling  Gastrointestinal: Negative for abdominal distention, abdominal pain, blood in stool, constipation and nausea  Endocrine: Negative for cold intolerance and heat intolerance  Genitourinary: Negative for difficulty urinating, dysuria, frequency, hematuria, pelvic pain and urgency  Musculoskeletal: Negative for arthralgias, back pain and gait problem  Skin: Negative for color change, rash and wound  Allergic/Immunologic: Negative for environmental allergies, food allergies and immunocompromised state  Neurological: Positive for dizziness  Negative for tremors, syncope and headaches  Hematological: Negative for adenopathy  Psychiatric/Behavioral: Negative for agitation and behavioral problems         Past Medical and Surgical History:     Past Medical History:   Diagnosis Date    Anemia     Diabetes mellitus ([facility] )     Dyspnea on exertion     Hyperlipidemia     Hypertension     Seizures [facility] )        Past Surgical History: Procedure Laterality Date    CATARACT EXTRACTION Bilateral     august and september    HYSTERECTOMY         Meds/Allergies:    Prior to Admission medications    Medication Sig Start Date End Date Taking?  Authorizing Provider   amLODIPine (NORVASC) 10 mg tablet Take 1 tablet by mouth daily 12/10/15  Yes Historical Provider, MD   aspirin 81 MG tablet Take 1 tablet by mouth daily 12/11/17  Yes Historical Provider, MD   atorvastatin (LIPITOR) 40 mg tablet Take 40 mg by mouth daily   7/10/15  Yes Historical Provider, MD SMITH MICROLET LANCETS lancets Inject 1 applicator into the skin 4 (four) times a day 7/27/11  Yes Historical Provider, MD   Cholecalciferol (VITAMIN D3) 2000 units capsule Take by mouth   Yes Historical Provider, MD   Insulin Glargine (BASAGLAR KWIKPEN SC) Inject 40 Units under the skin Medrol Dose Pack scheduling ONLY   Yes Historical Provider, MD   insulin glargine (LANTUS SOLOSTAR) injection pen 100 units/mL Inject 0 4 mL (40 Units total) under the skin daily 5/12/18  Yes Caitlin Ocasio MD   insulin lispro (HUMALOG KWIKPEN) 100 Units/mL SOPN Inject 10-18 Units under the skin 3 (three) times a day with meals 14 before breakfast 10 before lunch 18 before dinner plus sliding scale 3/30/18  Yes Eden Jacinto PA-C   Insulin Pen Needle (BD PEN NEEDLE DERRICK U/F) 32G X 4 MM MISC Inject 1 applicator under the skin 4 (four) times a day 11/2/16  Yes Historical Provider, MD   labetalol (NORMODYNE) 200 mg tablet Take 200 mg by mouth 2 (two) times a day   Yes Historical Provider, MD   metFORMIN (GLUCOPHAGE) 500 mg tablet Take 500 mg by mouth 2 (two) times a day with meals   6/22/16  Yes Historical Provider, MD   spironolactone (ALDACTONE) 25 mg tablet TAKE 1 TABLET BY MOUTH EVERY DAY 6/12/18  Yes Eden Jacinto PA-C   valsartan-hydrochlorothiazide (DIOVAN-HCT) 320-25 MG per tablet Take 1 tablet by mouth daily   Yes Historical Provider, MD SMITH CONTOUR NEXT TEST test strip 1 each by Other route 4 (four) times a day for 90 days DX E11 9 3/28/18 6/24/18 Yes Eden Jacinto PA-C RA SALINE NASAL SPRAY 0 65 % nasal spray INSTILL 1 TO 2 SPRAYS INTO EACH NOSTRIL EVERY HOUR AS NEEDED FOR NASAL DRYNESS OR SINUS CONGESTION 2/2/18 6/24/18 Yes Historical Provider, MD   fluticasone (FLONASE) 50 mcg/act nasal spray INSTILL 1 SPRAY INTO EACH NOSTRIL ONCE DAILY FOR 2 WEEKS 2/2/18   Historical Provider, MD   albuterol (VENTOLIN HFA) 90 mcg/act inhaler Inhale 2 puffs every 6 (six) hours as needed for wheezing or shortness of breath 3/22/18 6/24/18  Carlie Tapia MD   benzonatate (TESSALON PERLES) 100 mg capsule Take 100 mg by mouth 3 (three) times a day as needed for cough  6/24/18  Historical Provider, MD   MAPAP 500 MG tablet take 1-2 tablet by mouth every 6 hours if needed for pain or fever 2/2/18 6/24/18  Historical Provider, MD     I have reviewed home medications with patient personally      Allergies: No Known Allergies    Social History:     Marital Status: /Civil Union   Occupation: [employer]  Patient Pre-hospital Living Situation: Live at home  and son  Patient Pre-hospital Level of Mobility: Ambulates with cane  Patient Pre-hospital Diet Restrictions: DM Diet  Substance Use History:   History   Alcohol Use No     History   Smoking Status    Never Smoker   Smokeless Tobacco    Never Used     History   Drug Use No       Family History:    Family History   Problem Relation Age of Onset    Heart attack Mother     No Known Problems Father     No Known Problems Sister     No Known Problems Brother     No Known Problems Son     No Known Problems Daughter     Heart attack Maternal Grandmother     Diabetes Other     Heart attack Other        Physical Exam:     Vitals:   Blood Pressure: (!) 174/92 (06/24/18 1414)  Pulse: 76 (06/24/18 1414)  Temperature: (!) 96 7 °F (35 9 °C) (06/24/18 1055)  Temp Source: Temporal (06/24/18 1055)  Respirations: 18 (06/24/18 1414)  SpO2: 99 % (06/24/18 1414)    Physical Exam   Constitutional: She is oriented to person, place, and time  She appears well-developed and well-nourished  No distress  HENT:   Head: Normocephalic and atraumatic  Right Ear: External ear normal    Left Ear: External ear normal    Nose: Nose normal    Mouth/Throat: Oropharynx is clear and moist    Eyes: Conjunctivae and EOM are normal  Pupils are equal, round, and reactive to light  Right eye exhibits no discharge  Left eye exhibits no discharge  Neck: Normal range of motion  Neck supple  No JVD present  No tracheal deviation present  No thyromegaly present  Cardiovascular: Normal rate, regular rhythm, normal heart sounds and intact distal pulses  Exam reveals no gallop and no friction rub  No murmur heard  Pulmonary/Chest: Effort normal and breath sounds normal  No respiratory distress  She has no wheezes  She exhibits no tenderness  Abdominal: Soft  Bowel sounds are normal  She exhibits no distension  There is no tenderness  There is no rebound  Musculoskeletal: Normal range of motion  She exhibits no edema or tenderness  Neurological: She is alert and oriented to person, place, and time  She has normal reflexes  Coordination normal    Skin: Skin is warm and dry  No rash noted  She is not diaphoretic  No erythema  Psychiatric: She has a normal mood and affect  Her behavior is normal  Thought content normal        Additional Data:     Lab Results: I have personally reviewed pertinent reports          Results from last 7 days  Lab Units 06/24/18  1107   WBC Thousand/uL 7 80   HEMOGLOBIN g/dL 12 3   HEMATOCRIT % 37 7   PLATELETS Thousands/uL 308   NEUTROS PCT % 63   LYMPHS PCT % 28   MONOS PCT % 6   EOS PCT % 3       Results from last 7 days  Lab Units 06/24/18  1108   SODIUM mmol/L 140   POTASSIUM mmol/L 3 8   CHLORIDE mmol/L 102   CO2 mmol/L 30   BUN mg/dL 24   CREATININE mg/dL 1 01   CALCIUM mg/dL 9 6   TOTAL PROTEIN g/dL 7 4   BILIRUBIN TOTAL mg/dL 0 80   ALK PHOS U/L 132*   ALT U/L 37   AST U/L 22   GLUCOSE RANDOM mg/dL 209*                   Imaging: I have personally reviewed pertinent reports  X-ray chest 2 views   ED Interpretation by Smita Manuel MD (06/24 1329)   Congestion/chf      Final Result by Shannon White DO (06/24 1309)      Cardiomegaly  No acute cardiopulmonary disease  Workstation performed: [id number]             EKG, Pathology, and Other Studies Reviewed on Admission:   · EKG: Pending     Allscripts / Epic Records Reviewed: Yes   Assessment and plan was discussed with Dr Emil Krishnamurthy who agrees   ** Please Note: This note has been constructed using a voice recognition system   **

## 2018-06-24 NOTE — NURSING NOTE
Patient received from ED in stable condition via stretcher  Patient has unsteady gait, denies dizziness or SOB at this time  Patient is tolerating dinner  Fall precautions initiated  Patient is sitting comfortably in bed, call bell in reach  Will continue to monitor closely

## 2018-06-24 NOTE — ASSESSMENT & PLAN NOTE
Patient has Hx of poorly controlled HTN  - Came to ER with SOB and was found to have HTN Urgency   - Will place on observation   - Will order ECG stat as patient also C/O SOB and dizziness   - Restart patients BP medications with holding parameters  - Poor dieting will may have played factor will get Dietary Education

## 2018-06-24 NOTE — ED PROVIDER NOTES
History  Chief Complaint   Patient presents with    Shortness of Breath     pt  states that she became dizzy then became short of breath - started approx 30 minutes ago - anxious on arrival      Pt complains of lightheadedness and some SOB today when she was getting up to go for a walk  She has been feeling weak lately and her BS has been going up (@250 this am)  History provided by:  Patient      Prior to Admission Medications   Prescriptions Last Dose Informant Patient Reported? Taking?    SARAH MICROLET LANCETS lancets  Self Yes Yes   Sig: Inject 1 applicator into the skin 4 (four) times a day   Cholecalciferol (VITAMIN D3) 2000 units capsule 6/24/2018 at Unknown time Self Yes Yes   Sig: Take by mouth   Insulin Glargine (Cortez Stodd[place] SC) 6/23/2018 at Unknown time Self Yes Yes   Sig: Inject 40 Units under the skin Medrol Dose Pack scheduling ONLY   Insulin Pen Needle (BD PEN NEEDLE DERRICK U/F) 32G X 4 MM MISC  Self Yes Yes   Sig: Inject 1 applicator under the skin 4 (four) times a day   amLODIPine (NORVASC) 10 mg tablet 6/24/2018 at Unknown time Self Yes Yes   Sig: Take 1 tablet by mouth daily   aspirin 81 MG tablet 6/24/2018 at Unknown time Self Yes Yes   Sig: Take 1 tablet by mouth daily   atorvastatin (LIPITOR) 40 mg tablet 6/23/2018 at Unknown time Self Yes Yes   Sig: Take 40 mg by mouth daily     fluticasone (FLONASE) 50 mcg/act nasal spray More than a month at Unknown time Self Yes No   Sig: INSTILL 1 SPRAY INTO EACH NOSTRIL ONCE DAILY FOR 2 WEEKS   insulin glargine (LANTUS SOLOSTAR) injection pen 100 units/mL Past Month at Unknown time  No Yes   Sig: Inject 0 4 mL (40 Units total) under the skin daily   insulin lispro (HUMALOG KWIKPEN) 100 Units/mL SOPN 6/24/2018 at Unknown time  No Yes   Sig: Inject 10-18 Units under the skin 3 (three) times a day with meals 14 before breakfast 10 before lunch 18 before dinner plus sliding scale   labetalol (NORMODYNE) 200 mg tablet  Self Yes Yes   Sig: Take 200 mg by mouth 2 (two) times a day   metFORMIN (GLUCOPHAGE) 500 mg tablet 6/24/2018 at Unknown time Self Yes Yes   Sig: Take 500 mg by mouth 2 (two) times a day with meals     spironolactone (ALDACTONE) 25 mg tablet 6/24/2018 at Unknown time  No Yes   Sig: TAKE 1 TABLET BY MOUTH EVERY DAY   valsartan-hydrochlorothiazide (DIOVAN-HCT) 320-25 MG per tablet 6/24/2018 at Unknown time Self Yes Yes   Sig: Take 1 tablet by mouth daily      Facility-Administered Medications: None       Past Medical History:   Diagnosis Date    Anemia     Diabetes mellitus ([facility] [facility]     Dyspnea on exertion     Hyperlipidemia     Hypertension     [facility]izures [facility]        Past Surgical History:   Procedure Laterality Date    CATARACT EXTRACTION Bilateral     august and september    HYSTERECTOMY         Family History   Problem Relation Age of Onset    Heart attack Mother     No Known Problems Father     No Known Problems Sister     No Known Problems Brother     No Known Problems Son     No Known Problems Daughter     Heart attack Maternal Grandmother     Diabetes Other     Heart attack Other      I have reviewed and agree with the history as documented  Social History   Substance Use Topics    Smoking status: Never Smoker    Smokeless tobacco: Never Used    Alcohol use No        Review of Systems   Constitutional: Positive for activity change and diaphoresis  Negative for appetite change and chills  HENT: Negative  Eyes: Negative  Respiratory: Positive for shortness of breath  Cardiovascular: Positive for leg swelling  Negative for chest pain  Gastrointestinal: Positive for abdominal distention  Negative for abdominal pain, blood in stool, diarrhea, nausea and rectal pain  Genitourinary: Positive for frequency  Musculoskeletal: Positive for arthralgias, joint swelling, myalgias and neck stiffness  Skin: Negative  Allergic/Immunologic: Negative  Neurological: Positive for light-headedness  Hematological: Negative  Psychiatric/Behavioral: Negative  All other systems reviewed and are negative  Physical Exam  Physical Exam   Constitutional: She is oriented to person, place, and time  She appears well-developed and well-nourished  No distress  HENT:   Head: Normocephalic and atraumatic  Eyes: Conjunctivae and EOM are normal  Pupils are equal, round, and reactive to light  Neck: Normal range of motion  Neck supple  Cardiovascular: Normal rate, regular rhythm, normal heart sounds and intact distal pulses  Exam reveals no friction rub  No murmur heard  Pulmonary/Chest: Effort normal and breath sounds normal  No respiratory distress  She has no wheezes  Abdominal: Soft  Bowel sounds are normal  She exhibits distension  She exhibits no mass  There is no tenderness  There is no rebound and no guarding  obesity   Musculoskeletal: Normal range of motion  She exhibits edema  Neurological: She is alert and oriented to person, place, and time  No cranial nerve deficit  Skin: Skin is warm  Capillary refill takes less than 2 seconds  No rash noted  She is not diaphoretic  No erythema  Psychiatric: She has a normal mood and affect  Her behavior is normal  Judgment and thought content normal    Nursing note and vitals reviewed        Vital Signs  ED Triage Vitals [06/24/18 1055]   Temperature Pulse Respirations Blood Pressure SpO2   (!) 96 7 °F (35 9 °C) 78 (!) 28 (!) 185/100 97 %      Temp Source Heart Rate Source Patient Position - Orthostatic VS BP Location FiO2 (%)   Temporal Monitor Sitting Left arm --      Pain Score       No Pain           Vitals:    06/24/18 1055 06/24/18 1150 06/24/18 1210 06/24/18 1414   BP: (!) 185/100 (!) 186/107 (!) 189/104 (!) 174/92   Pulse: 78 70 73 76   Patient Position - Orthostatic VS: Sitting Lying Sitting Lying       Visual Acuity      ED Medications  Medications   sodium chloride (PF) 0 9 % injection 3 mL (not administered)   LORazepam (ATIVAN) 2 mg/mL injection 1 mg (1 mg Intravenous Not Given 6/24/18 1130)   nitroglycerin (NITRO-BID) 2 % TD ointment 1 inch (1 inch Topical Given 6/24/18 1146)   albuterol inhalation solution 2 5 mg (2 5 mg Nebulization Given 6/24/18 1147)   ipratropium (ATROVENT) 0 02 % inhalation solution 0 5 mg (0 5 mg Nebulization Given 6/24/18 1147)       Diagnostic Studies  Results Reviewed     Procedure Component Value Units Date/Time    D-dimer, quantitative [[id number]]     Lab Status:  No result Specimen:  Blood     Everly draw [[id number]] Collected:  06/24/18 1108    Lab Status:  Final result Specimen:  Blood Updated:  06/24/18 1301    Narrative: The following orders were created for panel order Everly draw  Procedure                               Abnormality         Status                     ---------                               -----------         ------                     Rudolph Sanjay Top on QZGO[[id number]]                            Final result               Gold top on LNGS[[id number]]                                  Final result               Green / Yellow tube on XZXB[[id number]]                                                  Green / Black tube on hold[[id number]]                                                   Lavender Top 3 ml on hold[[id number]]                                                      Please view results for these tests on the individual orders  TSH, 3rd generation [[id number]]  (Normal) Collected:  06/24/18 1108    Lab Status:  Final result Specimen:  Blood from Arm, Right Updated:  06/24/18 1257     TSH 3RD GENERATON 1 680 uIU/mL     Narrative:         Patients undergoing fluorescein dye angiography may retain small amounts of fluorescein in the body for 48-72 hours post procedure  Samples containing fluorescein can produce falsely depressed TSH values  If the patient had this procedure,a specimen should be resubmitted post fluorescein clearance            The recommended reference ranges for TSH during pregnancy are as follows:  First trimester 0 1 to 2 5 uIU/mL  Second trimester  0 2 to 3 0 uIU/mL  Third trimester 0 3 to 3 0 uIU/m      NT-BNP PRO [[id number]]  (Normal) Collected:  06/24/18 1129    Lab Status:  Final result Specimen:  Blood from Arm, Right Updated:  06/24/18 1231     NT-proBNP 90 3 pg/mL     Magnesium [[id number]]  (Normal) Collected:  06/24/18 1108    Lab Status:  Final result Specimen:  Blood from Arm, Right Updated:  06/24/18 1225     Magnesium 2 1 mg/dL     Troponin I [[id number]]  (Normal) Collected:  06/24/18 1108    Lab Status:  Final result Specimen:  Blood from Arm, Right Updated:  06/24/18 1220     Troponin I <0 01 ng/mL     CBC and differential [[id number]]  (Abnormal) Collected:  06/24/18 1107    Lab Status:  Final result Specimen:  Blood from Arm, Right Updated:  06/24/18 1200     WBC 7 80 Thousand/uL      RBC 4 84 Million/uL      Hemoglobin 12 3 g/dL      Hematocrit 37 7 %      MCV 78 (L) fL      MCH 25 4 (L) pg      MCHC 32 6 g/dL      RDW 15 2 %      MPV 8 9 fL      Platelets 485 Thousands/uL      Neutrophils Relative 63 %      Lymphocytes Relative 28 %      Monocytes Relative 6 %      Eosinophils Relative 3 %      Basophils Relative 1 %      Neutrophils Absolute 4 90 Thousands/µL      Lymphocytes Absolute 2 10 Thousands/µL      Monocytes Absolute 0 50 Thousand/µL      Eosinophils Absolute 0 20 Thousand/µL      Basophils Absolute 0 10 Thousands/µL     Comprehensive metabolic panel [[id number]]  (Abnormal) Collected:  06/24/18 1108    Lab Status:  Final result Specimen:  Blood from Arm, Right Updated:  06/24/18 1136     Sodium 140 mmol/L      Potassium 3 8 mmol/L      Chloride 102 mmol/L      CO2 30 mmol/L      Anion Gap 8 mmol/L      BUN 24 mg/dL      Creatinine 1 01 mg/dL      Glucose 209 (H) mg/dL      Calcium 9 6 mg/dL      AST 22 U/L      ALT 37 U/L      Alkaline Phosphatase 132 (H) U/L      Total Protein 7 4 g/dL      Albumin 3 9 g/dL      Total Bilirubin 0 80 mg/dL      eGFR 70 ml/min/1 73sq m     Narrative:         National Kidney Disease Education Program recommendations are as follows:  GFR calculation is accurate only with a steady state creatinine  Chronic Kidney disease less than 60 ml/min/1 73 sq  meters  Kidney failure less than 15 ml/min/1 73 sq  meters  X-ray chest 2 views   ED Interpretation by Abiola Shepard MD (06/24 1329)   Congestion/chf      Final Result by Hortensia Johnson DO (06/24 1309)      Cardiomegaly  No acute cardiopulmonary disease  Workstation performed: [id number]                    Procedures  Procedures       Phone Contacts  ED Phone Contact    ED Course  ED Course as of Jun 24 1711   Sun Jun 24, 2018   1327 Calcium: 9 6                               MDM  Number of Diagnoses or Management Options  Essential hypertension: established and improving  Postural dizziness with presyncope: new and requires workup  Shortness of breath: new and requires workup  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, with long-term current use of insulin ([facility] [address] ): established and improving     Amount and/or Complexity of Data Reviewed  Clinical lab tests: ordered and reviewed  Tests in the radiology section of CPT®: ordered and reviewed  Tests in the medicine section of CPT®: ordered and reviewed    Risk of Complications, Morbidity, and/or Mortality  Presenting problems: high  Diagnostic procedures: moderate  Management options: moderate  General comments: Pt had what appeared to be a hypertensive emergency with HTN and SOB as well as syncope/presyncope sx  He BP has improved with NTG paste  She has been feeling weak and dehydrated and is sensitive to the current weather and heat  Her BS have been elevated as well         The patient presented with a condition in which there was a high probability of imminent or life-threatening deterioration, and critical care services (excluding separately billable procedures) totalled 30-74 minutes  Disposition  Final diagnoses:   Shortness of breath - hypertensive emergency   Postural dizziness with presyncope   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, with long-term current use of insulin ([address] )   Essential hypertension     Time reflects when diagnosis was documented in both MDM as applicable and the Disposition within this note     Time User Action Codes Description Comment    6/24/2018  2:06 PM Nicholos  Add [R06 02] Shortness of breath     6/24/2018  2:06 PM Nicholos  Add [T48,  R55] Postural dizziness with presyncope     6/24/2018  2:06 PM Nicholos  Modify [R06 02] Shortness of breath     6/24/2018  2:06 PM Nicholos  Modify [U24,  R55] Postural dizziness with presyncope     6/24/2018  2:16 PM Nicholos  Add [E11 39,  Z79 4,  E11 65] 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, with long-term current use of insulin ([address] )     6/24/2018  2:16 PM Rohits  Modify [E11 39,  Z79 4,  E11 65] 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, with long-term current use of insulin ([address] )     6/24/2018  2:17 PM Negro, [address] Essential hypertension     6/24/2018  2:17 PM Negro, [address]  Essential hypertension     6/24/2018  2:21 PM Rohits Andreas Modify [R06 02] Shortness of breath hypertensive emergency      ED Disposition     ED Disposition Condition Comment    Admit  Case was discussed with Dr on Call (DR Vasyl Castro) and the patient's admission status was agreed to be Admission Status: inpatient status to the service of Dr Vasyl Castro           Follow-up Information    None         Current Discharge Medication List      CONTINUE these medications which have NOT CHANGED    Details   amLODIPine (NORVASC) 10 mg tablet Take 1 tablet by mouth daily      aspirin 81 MG tablet Take 1 tablet by mouth daily      atorvastatin (LIPITOR) 40 mg tablet Take 40 mg by mouth daily        SARAH MICROLET LANCETS lancets Inject 1 applicator into the skin 4 (four) times a day      Cholecalciferol (VITAMIN D3) 2000 units capsule Take by mouth      !! Insulin Glargine (BASAGLAR KWIKPEN SC) Inject 40 Units under the skin Medrol Dose Pack scheduling ONLY      !! insulin glargine (LANTUS SOLOSTAR) injection pen 100 units/mL Inject 0 4 mL (40 Units total) under the skin daily  Qty: 5 pen, Refills: 0    Comments: This prescription is for basaglar  Associated Diagnoses: Type 2 diabetes mellitus with hyperglycemia, with long-term current use of insulin ([facility])      insulin lispro (HUMALOG KWIKPEN) 100 Units/mL SOPN Inject 10-18 Units under the skin 3 (three) times a day with meals 14 before breakfast 10 before lunch 18 before dinner plus sliding scale  Qty: 5 pen, Refills: 0    Associated Diagnoses: 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, with long-term current use of insulin ([facility])      Insulin Pen Needle (BD PEN NEEDLE DERRICK U/F) 32G X 4 MM MISC Inject 1 applicator under the skin 4 (four) times a day      labetalol (NORMODYNE) 200 mg tablet Take 200 mg by mouth 2 (two) times a day      metFORMIN (GLUCOPHAGE) 500 mg tablet Take 500 mg by mouth 2 (two) times a day with meals        spironolactone (ALDACTONE) 25 mg tablet TAKE 1 TABLET BY MOUTH EVERY DAY  Qty: 90 tablet, Refills: 0    Associated Diagnoses: Hypertension, unspecified type      valsartan-hydrochlorothiazide (DIOVAN-HCT) 320-25 MG per tablet Take 1 tablet by mouth daily      fluticasone (FLONASE) 50 mcg/act nasal spray INSTILL 1 SPRAY INTO EACH NOSTRIL ONCE DAILY FOR 2 WEEKS  Refills: 0       !! - Potential duplicate medications found  Please discuss with provider  No discharge procedures on file      ED Provider  Electronically Signed by           Joe Preston MD  06/24/18 2562

## 2018-06-25 ENCOUNTER — APPOINTMENT (INPATIENT)
Dept: NON INVASIVE DIAGNOSTICS | Facility: HOSPITAL | Age: 58
DRG: 305 | End: 2018-06-25
Payer: COMMERCIAL

## 2018-06-25 PROBLEM — I16.0 HYPERTENSIVE URGENCY: Status: RESOLVED | Noted: 2018-06-24 | Resolved: 2018-06-25

## 2018-06-25 PROBLEM — R06.02 SHORTNESS OF BREATH: Status: RESOLVED | Noted: 2018-02-13 | Resolved: 2018-06-25

## 2018-06-25 PROBLEM — I95.1 ORTHOSTATIC HYPOTENSION: Status: ACTIVE | Noted: 2018-06-25

## 2018-06-25 LAB
ALBUMIN SERPL BCP-MCNC: 3.5 G/DL (ref 3–5.2)
ALP SERPL-CCNC: 132 U/L (ref 43–122)
ALT SERPL W P-5'-P-CCNC: 28 U/L (ref 9–52)
ANION GAP SERPL CALCULATED.3IONS-SCNC: 6 MMOL/L (ref 5–14)
AST SERPL W P-5'-P-CCNC: 23 U/L (ref 14–36)
ATRIAL RATE: 75 BPM
ATRIAL RATE: 79 BPM
BASOPHILS # BLD AUTO: 0.1 THOUSANDS/ΜL (ref 0–0.1)
BASOPHILS NFR BLD AUTO: 1 % (ref 0–1)
BILIRUB SERPL-MCNC: 0.7 MG/DL
BUN SERPL-MCNC: 26 MG/DL (ref 5–25)
CALCIUM SERPL-MCNC: 9.3 MG/DL (ref 8.4–10.2)
CHLORIDE SERPL-SCNC: 101 MMOL/L (ref 97–108)
CO2 SERPL-SCNC: 30 MMOL/L (ref 22–30)
CREAT SERPL-MCNC: 0.88 MG/DL (ref 0.6–1.2)
D-DIMER: 0.54 MG/L
EOSINOPHIL # BLD AUTO: 0.3 THOUSAND/ΜL (ref 0–0.4)
EOSINOPHIL NFR BLD AUTO: 3 % (ref 0–6)
ERYTHROCYTE [DISTWIDTH] IN BLOOD BY AUTOMATED COUNT: 15.4 %
EST. AVERAGE GLUCOSE BLD GHB EST-MCNC: 272 MG/DL
GFR SERPL CREATININE-BSD FRML MDRD: 83 ML/MIN/1.73SQ M
GLUCOSE SERPL-MCNC: 240 MG/DL (ref 70–99)
GLUCOSE SERPL-MCNC: 247 MG/DL (ref 70–99)
GLUCOSE SERPL-MCNC: 277 MG/DL (ref 70–99)
GLUCOSE SERPL-MCNC: 279 MG/DL (ref 70–99)
GLUCOSE SERPL-MCNC: 284 MG/DL (ref 70–99)
HBA1C MFR BLD: 11.1 % (ref 4.2–6.3)
HCT VFR BLD AUTO: 34.4 % (ref 36–46)
HGB BLD-MCNC: 11.1 G/DL (ref 12–16)
HYPERCHROMIA BLD QL SMEAR: PRESENT
LYMPHOCYTES # BLD AUTO: 2.5 THOUSANDS/ΜL (ref 0.5–4)
LYMPHOCYTES NFR BLD AUTO: 33 % (ref 20–50)
MCH RBC QN AUTO: 25.6 PG (ref 26–34)
MCHC RBC AUTO-ENTMCNC: 32.4 G/DL (ref 31–36)
MCV RBC AUTO: 79 FL (ref 80–100)
MONOCYTES # BLD AUTO: 0.5 THOUSAND/ΜL (ref 0.2–0.9)
MONOCYTES NFR BLD AUTO: 7 % (ref 1–10)
NEUTROPHILS # BLD AUTO: 4.4 THOUSANDS/ΜL (ref 1.8–7.8)
NEUTS SEG NFR BLD AUTO: 57 % (ref 45–65)
P AXIS: 44 DEGREES
P AXIS: 46 DEGREES
PLATELET # BLD AUTO: 295 THOUSANDS/UL (ref 150–450)
PLATELET BLD QL SMEAR: ADEQUATE
PMV BLD AUTO: 9.5 FL (ref 8.9–12.7)
POTASSIUM SERPL-SCNC: 3.8 MMOL/L (ref 3.6–5)
PR INTERVAL: 192 MS
PR INTERVAL: 198 MS
PROT SERPL-MCNC: 7 G/DL (ref 5.9–8.4)
QRS AXIS: -1 DEGREES
QRS AXIS: -6 DEGREES
QRSD INTERVAL: 100 MS
QRSD INTERVAL: 102 MS
QT INTERVAL: 370 MS
QT INTERVAL: 442 MS
QTC INTERVAL: 424 MS
QTC INTERVAL: 493 MS
RBC # BLD AUTO: 4.35 MILLION/UL (ref 4–5.2)
RBC MORPH BLD: NORMAL
SODIUM SERPL-SCNC: 137 MMOL/L (ref 137–147)
T WAVE AXIS: 25 DEGREES
T WAVE AXIS: 28 DEGREES
VENTRICULAR RATE: 75 BPM
VENTRICULAR RATE: 79 BPM
WBC # BLD AUTO: 7.8 THOUSAND/UL (ref 4.5–11)

## 2018-06-25 PROCEDURE — 83036 HEMOGLOBIN GLYCOSYLATED A1C: CPT | Performed by: FAMILY MEDICINE

## 2018-06-25 PROCEDURE — 80053 COMPREHEN METABOLIC PANEL: CPT | Performed by: FAMILY MEDICINE

## 2018-06-25 PROCEDURE — 82948 REAGENT STRIP/BLOOD GLUCOSE: CPT

## 2018-06-25 PROCEDURE — 85025 COMPLETE CBC W/AUTO DIFF WBC: CPT | Performed by: FAMILY MEDICINE

## 2018-06-25 PROCEDURE — 93970 EXTREMITY STUDY: CPT | Performed by: SURGERY

## 2018-06-25 PROCEDURE — 93306 TTE W/DOPPLER COMPLETE: CPT

## 2018-06-25 PROCEDURE — 99232 SBSQ HOSP IP/OBS MODERATE 35: CPT | Performed by: FAMILY MEDICINE

## 2018-06-25 PROCEDURE — 93970 EXTREMITY STUDY: CPT

## 2018-06-25 PROCEDURE — 93010 ELECTROCARDIOGRAM REPORT: CPT | Performed by: INTERNAL MEDICINE

## 2018-06-25 RX ORDER — ACETAMINOPHEN 325 MG/1
650 TABLET ORAL EVERY 6 HOURS PRN
Status: DISCONTINUED | OUTPATIENT
Start: 2018-06-25 | End: 2018-06-26 | Stop reason: HOSPADM

## 2018-06-25 RX ORDER — VALSARTAN 40 MG/1
40 TABLET ORAL DAILY
Status: DISCONTINUED | OUTPATIENT
Start: 2018-06-25 | End: 2018-06-26 | Stop reason: HOSPADM

## 2018-06-25 RX ORDER — CANDESARTAN 16 MG/1
32 TABLET ORAL DAILY
Status: DISCONTINUED | OUTPATIENT
Start: 2018-06-26 | End: 2018-06-25

## 2018-06-25 RX ORDER — INSULIN GLARGINE 100 [IU]/ML
45 INJECTION, SOLUTION SUBCUTANEOUS
Status: DISCONTINUED | OUTPATIENT
Start: 2018-06-25 | End: 2018-06-26 | Stop reason: HOSPADM

## 2018-06-25 RX ORDER — HYDRALAZINE HYDROCHLORIDE 20 MG/ML
5 INJECTION INTRAMUSCULAR; INTRAVENOUS EVERY 6 HOURS PRN
Status: DISCONTINUED | OUTPATIENT
Start: 2018-06-25 | End: 2018-06-25

## 2018-06-25 RX ORDER — LABETALOL 100 MG/1
200 TABLET, FILM COATED ORAL 2 TIMES DAILY
Status: DISCONTINUED | OUTPATIENT
Start: 2018-06-25 | End: 2018-06-26 | Stop reason: HOSPADM

## 2018-06-25 RX ORDER — HYDROCHLOROTHIAZIDE 25 MG/1
25 TABLET ORAL DAILY
Status: DISCONTINUED | OUTPATIENT
Start: 2018-06-26 | End: 2018-06-25

## 2018-06-25 RX ORDER — VALSARTAN 40 MG/1
80 TABLET ORAL DAILY
Status: DISCONTINUED | OUTPATIENT
Start: 2018-06-25 | End: 2018-06-25

## 2018-06-25 RX ORDER — FUROSEMIDE 10 MG/ML
20 INJECTION INTRAMUSCULAR; INTRAVENOUS ONCE
Status: COMPLETED | OUTPATIENT
Start: 2018-06-25 | End: 2018-06-25

## 2018-06-25 RX ORDER — INSULIN GLARGINE 100 [IU]/ML
40 INJECTION, SOLUTION SUBCUTANEOUS
Status: DISCONTINUED | OUTPATIENT
Start: 2018-06-25 | End: 2018-06-25

## 2018-06-25 RX ADMIN — ATORVASTATIN CALCIUM 40 MG: 40 TABLET, FILM COATED ORAL at 21:48

## 2018-06-25 RX ADMIN — INSULIN LISPRO 4 UNITS: 100 INJECTION, SOLUTION INTRAVENOUS; SUBCUTANEOUS at 06:32

## 2018-06-25 RX ADMIN — FUROSEMIDE 20 MG: 10 INJECTION, SOLUTION INTRAVENOUS at 11:40

## 2018-06-25 RX ADMIN — LABETALOL HYDROCHLORIDE 200 MG: 100 TABLET, FILM COATED ORAL at 08:09

## 2018-06-25 RX ADMIN — HYDROCHLOROTHIAZIDE 12.5 MG: 25 TABLET ORAL at 08:10

## 2018-06-25 RX ADMIN — ASPIRIN 81 MG: 81 TABLET, COATED ORAL at 08:09

## 2018-06-25 RX ADMIN — INSULIN LISPRO 4 UNITS: 100 INJECTION, SOLUTION INTRAVENOUS; SUBCUTANEOUS at 16:44

## 2018-06-25 RX ADMIN — INSULIN LISPRO 4 UNITS: 100 INJECTION, SOLUTION INTRAVENOUS; SUBCUTANEOUS at 11:49

## 2018-06-25 RX ADMIN — AMLODIPINE BESYLATE 10 MG: 5 TABLET ORAL at 08:09

## 2018-06-25 RX ADMIN — SPIRONOLACTONE 25 MG: 25 TABLET, FILM COATED ORAL at 08:10

## 2018-06-25 RX ADMIN — INSULIN GLARGINE 45 UNITS: 100 INJECTION, SOLUTION SUBCUTANEOUS at 21:48

## 2018-06-25 RX ADMIN — VALSARTAN 40 MG: 40 TABLET, FILM COATED ORAL at 15:42

## 2018-06-25 RX ADMIN — HEPARIN SODIUM 5000 UNITS: 5000 INJECTION, SOLUTION INTRAVENOUS; SUBCUTANEOUS at 13:56

## 2018-06-25 RX ADMIN — LABETALOL HYDROCHLORIDE 200 MG: 100 TABLET, FILM COATED ORAL at 18:30

## 2018-06-25 RX ADMIN — VITAMIN D, TAB 1000IU (100/BT) 1000 UNITS: 25 TAB at 08:10

## 2018-06-25 RX ADMIN — HEPARIN SODIUM 5000 UNITS: 5000 INJECTION, SOLUTION INTRAVENOUS; SUBCUTANEOUS at 06:32

## 2018-06-25 RX ADMIN — HEPARIN SODIUM 5000 UNITS: 5000 INJECTION, SOLUTION INTRAVENOUS; SUBCUTANEOUS at 21:48

## 2018-06-25 NOTE — ASSESSMENT & PLAN NOTE
- slightly hypertensive w/ orthostatic hypotension as mentioned below  - changes in BP medications per orthostatic A&P  - low salt diet as above

## 2018-06-25 NOTE — NURSING NOTE
VSS  No assessment changes  Monitor- NSR  Lungs clear  OOB ambulating in room with steady gait  Denies any pain  Call bell at pt's  Reach

## 2018-06-25 NOTE — ASSESSMENT & PLAN NOTE
- PT/OT eval and treat  - discontinued atancad/HCTZ and started patient on diovan 80mg, will continue to monitor blood pressure for hypertension/improvement in orthostasis  - would recommend ECHO at some point, patient does have trace dependent edema, if stable on diovan will restart HCTZ at lower dose  - hydralazine ordered PRN for SBP >180

## 2018-06-25 NOTE — CASE MANAGEMENT
Please be aware that [facility] has merged with [address] [place] [address]  Initial Clinical Review  Admission: Date/Time/Statement: 6/24/18 @ 1409            Orders Placed This Encounter   Procedures    Inpatient Admission (expected length of stay for this patient is greater than two midnights)       Standing Status:   Standing       Number of Occurrences:   1       Order Specific Question:   Admitting Physician       Answer:   Cecilia Luna [[id number]]       Order Specific Question:   Level of Care       Answer:   Med Surg [16]       Order Specific Question:   Estimated length of stay       Answer:   More than 2 Midnights       Order Specific Question:   Certification       Answer:   I certify that inpatient services are medically necessary for this patient for a duration of greater than two midnights  See H&P and MD Progress Notes for additional information about the patient's course of treatment          ED: Date/Time/Mode of Arrival:             ED Arrival Information      Expected Arrival Acuity Means of Arrival Escorted By Service Admission Type     - 6/24/2018 10:51 Urgent Walk-In Self General Medicine Urgent     Arrival Complaint     SOB             Chief Complaint:        Chief Complaint   Patient presents with    Shortness of Breath       pt  states that she became dizzy then became short of breath - started approx 30 minutes ago - anxious on arrival          History of Illness:  61 y o  female who presents with SOB, that worsened this morning  Patient has been experiencing SOB for several months however she had worsening SOB this morning  Patient was working around the house when she started to feel dizzy and had blurry vision during this episode  This episode lasted > 5 mins  No alleviating factors, No exacerbating factors  Patient say that this happens sometimes   Denies chest pain, ABD pain, no LE tenderness, N/V       ED Vital Signs:            ED Triage Vitals [06/24/18 1055] Temperature Pulse Respirations Blood Pressure SpO2   (!) 96 7 °F (35 9 °C) 78 (!) 28 (!) 185/100 97 %       Temp Source Heart Rate Source Patient Position - Orthostatic VS BP Location FiO2 (%)   Temporal Monitor Sitting Left arm --       Pain Score           No Pain                Wt Readings from Last 1 Encounters:   06/24/18 (!) 137 kg (302 lb 7 5 oz)         Vital Signs:  06/24 0701  06/25 0700 06/25 0701  06/25 1424   Most Recent       Temperature (°F) 96 5-97 5 96 9-97 9   97 4 (36 3)    Pulse 65-78 69-87   72     Respirations 18-28 18   18     Blood Pressure 140//107 129//83   130/70     SpO2 (%)  96-98   98           Abnormal Labs/Diagnostic Test Results: Glucose 209, Alk phos 132, D-simer 0 54     CXR -- Cardiomegaly   No acute cardiopulmonary disease    EKG -- NSR  Echo     ED Treatment:              Medication Administration from 06/24/2018 1051 to 06/24/2018 1647        Date/Time Order Dose Route Action Action by Comments       06/24/2018 1146 nitroglycerin (NITRO-BID) 2 % TD ointment 1 inch 1 inch Topical Given Shilpi Danger, RN         06/24/2018 1147 albuterol inhalation solution 2 5 mg 2 5 mg Nebulization Given Shilpi Danger, RN         06/24/2018 1147 ipratropium (ATROVENT) 0 02 % inhalation solution 0 5 mg 0 5 mg Nebulization Given Shilpi Danger, RN               Past Medical/Surgical History:           Past Medical History:   Diagnosis Date    Anemia      Diabetes mellitus ([facility] [address] )      Dyspnea on exertion      Hyperlipidemia      Hypertension      Legally blind 2010    Miscarriage      Seizures ([facility] [address] )           Admitting Diagnosis: Shortness of breath [R06 02]  Essential hypertension [I10]  Postural dizziness with presyncope [R42, R55]  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, with long-term current use of insulin (HCC) [E11 39, Z79 4, E11 65]     Age/Sex: 59 y o  female     Assessment/Plan:        * Hypertensive urgency   Assessment & Plan     Patient has Hx of poorly controlled HTN  - Came to ER with SOB and was found to have HTN Urgency   - Will order ECG stat as patient also C/O SOB and dizziness   - Restart patients BP medications with holding parameters  - Poor dieting will may have played factor will get Dietary Education          Shortness of breath   Assessment & Plan     Patient had SOB starting this morning   Most likely 2/2 HTN Urgency   - Patient does not need any supplemental O2   - No labored breathing   - D- Dimer pending, Dave Manger will be calculated at that time          Hypertension   Assessment & Plan     As above          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, with long-term current use of insulin (HCC)   Assessment & Plan                   Lab Results   Component Value Date     HGBA1C 8 2 (H) 09/20/2017         No results for input(s): POCGLU in the last 72 hours      Blood Sugar Average: Last 72 hrs:     Will reduce patients Lantus to 30U qhs instead of 40 as she will be in the hospital ( controlled environment)   Will place on ISS and accucheck   - Consult Diabetic education   - Hba1c                  Admission Orders:  M/S/Tele unit  Telem  Echo  Orthostatic bp's  Fingerstick glucose checks qid w/ ssi  Cardiac diet  SCD's  Up with assistance  PT/OT evals     Scheduled Meds:   Current Facility-Administered Medications:  acetaminophen 650 mg Oral Q6H PRN Silvia Pascual MD   amLODIPine 10 mg Oral Daily Abena Macias MD   aspirin 81 mg Oral Daily Abena Macias MD   atorvastatin 40 mg Oral Daily Abena Macias MD   cholecalciferol 1,000 Units Oral Daily Abena Macias MD   fluticasone 2 spray Each Nare BID Abena Macias MD   heparin (porcine) 5,000 Units Subcutaneous [facility] Abena Macias MD   insulin glargine 45 Units Subcutaneous HS Silvia Pascual MD   insulin lispro 4 Units Subcutaneous TID With Meals Silvia Pascual MD   labetalol 200 mg Oral BID Silvia Pascual MD   perflutren lipid microsphere 0 8 mL/min Intravenous Once in imaging Lillian Lal MD   sodium chloride (PF) 3 mL Intravenous PRN Nestor Dallas MD   spironolactone 25 mg Oral Daily Fidelia Guerrero MD   valsartan 40 mg Oral Daily Lillian Lal MD      PRN Meds:   acetaminophen    perflutren lipid microsphere    Notification of Discharge  This is a Notification of Discharge from our facility [address]  Please be advised that this patient has been discharge from our facility  Below you will find the admission and discharge date and time including the patients disposition  PRESENTATION DATE: 6/24/2018 10:54 AM  IP ADMISSION DATE: 6/24/18 1409  DISCHARGE DATE: 6/26/2018  6:30 PM  DISPOSITION: Home/Self Care    [facility] in the [facility] by Maco Hernández for 2017  Network Utilization Review Department  Phone: [phone]; Fax [phone]  ATTENTION: The Network Utilization Review Department is now centralized for our 7 Facilities  Make a note that we have a new phone and fax numbers for our Department  Please call with any questions or concerns to [phone] and carefully follow the prompts so that you are directed to the right person  All voicemails are confidential  Fax any determinations, approvals, denials, and requests for initial or continue stay review clinical to [phone]  Due to HIGH CALL volume, it would be easier if you could please send faxed requests to expedite your requests and in part, help us provide discharge notifications faster

## 2018-06-25 NOTE — PLAN OF CARE
PAIN - ADULT     Verbalizes/displays adequate comfort level or baseline comfort level Adequate for Discharge        Potential for Falls     Patient will remain free of falls Adequate for Discharge        SAFETY ADULT     Maintain or return to baseline ADL function Adequate for Discharge     Maintain or return mobility status to optimal level Adequate for Discharge

## 2018-06-25 NOTE — PROGRESS NOTES
Progress Note - Katia Norton 1960, 59 y o  female MRN: [id number]    Unit/Bed#: 7T [facility] 713-02 Encounter: [id number]    Primary Care Provider: Lisandro Bartlett MD   Date and time admitted to hospital: 6/24/2018 10:54 AM        Orthostatic hypotension   Assessment & Plan    - PT/OT eval and treat  - discontinued atancad/HCTZ and started patient on diovan 80mg, will continue to monitor blood pressure for hypertension/improvement in orthostasis  - would recommend ECHO at some point, patient does have trace dependent edema, if stable on diovan will restart HCTZ at lower dose  - hydralazine ordered PRN for SBP >180        Hypertension   Assessment & Plan    - slightly hypertensive w/ orthostatic hypotension as mentioned below  - changes in BP medications per orthostatic A&P  - low salt diet as above        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, with long-term current use of insulin [facility])   Assessment & Plan    Lab Results   Component Value Date    HGBA1C 8 2 (H) 09/20/2017       Recent Labs      06/24/18   1715  06/24/18   2117  06/25/18   0538   POCGLU  247*  315*  240*       Blood Sugar Average: Last 72 hrs:    - Placed back on home dose Lantus, per patient, she has been hyperglycemic and was increased to 40U Lantus 3 months ago and is still uncontrolled, no need to decrease Lantus inpatient since she continues to be hyperglycemic which may be contributing to orthostasis/autonomic dysfunction  - ISS and accucheck   - Consult Diabetic education   - Hba1c ordered  - diabetic diet            VTE Pharmacologic Prophylaxis:   Pharmacologic: Heparin  Mechanical VTE Prophylaxis in Place: No    Patient Centered Rounds: I have performed bedside rounds with nursing staff today  Discussions with Specialists or Other Care Team Provider: None    Education and Discussions with Family / Patient: Patient    Time Spent for Care: 30 minutes    More than 50% of total time spent on counseling and coordination of care as described above  Current Length of Stay: 1 day(s)    Current Patient Status: Inpatient   Certification Statement: The patient will continue to require additional inpatient hospital stay due to orthostasis and poor glycemic control    Discharge Plan: Home tomorrow    Code Status: Level 1 - Full Code      Subjective:   Patient reports that she is feeling better today  She denies any shortness of breath or chest pain  She does state that she is legally blind and that she has noticed a continues to decrease in her visual acuity over the past several months  She does see Endocrinology for diabetic control, Dr EASTON [facility], and [facility]  She reports that her insulin regimen was increased 3 months ago, but she still continues to have uncontrolled blood glucose levels  Objective:     Vitals:   Temp (24hrs), Av 1 °F (36 2 °C), Min:96 5 °F (35 8 °C), Max:97 9 °F (36 6 °C)    HR:  [65-87] 87  Resp:  [18-28] 18  BP: (129-205)/() 129/79  SpO2:  [95 %-100 %] 96 %  Body mass index is 45 99 kg/m²  Input and Output Summary (last 24 hours): Intake/Output Summary (Last 24 hours) at 18 0957  Last data filed at 18 0859   Gross per 24 hour   Intake             1740 ml   Output              550 ml   Net             1190 ml       Physical Exam:     Physical Exam   Constitutional: She is oriented to person, place, and time  She appears well-developed and well-nourished  She is active and cooperative  HENT:   Head: Normocephalic and atraumatic  Eyes: Pupils are equal, round, and reactive to light  Neck: Normal range of motion  Cardiovascular: Normal rate and normal heart sounds  No murmur heard  Pulmonary/Chest: Effort normal and breath sounds normal  No respiratory distress  Distant     Abdominal: Soft  She exhibits no distension  There is no tenderness  Obese   Musculoskeletal: She exhibits edema     Neurological: She is alert and oriented to person, place, and time    Skin: Skin is warm and dry  Psychiatric: She has a normal mood and affect  Her behavior is normal        Additional Data:     Labs:      Results from last 7 days  Lab Units 06/25/18  0523   WBC Thousand/uL 7 80   HEMOGLOBIN g/dL 11 1*   HEMATOCRIT % 34 4*   PLATELETS Thousands/uL 295   NEUTROS PCT % 57   LYMPHS PCT % 33   MONOS PCT % 7   EOS PCT % 3       Results from last 7 days  Lab Units 06/25/18  0523   SODIUM mmol/L 137   POTASSIUM mmol/L 3 8   CHLORIDE mmol/L 101   CO2 mmol/L 30   BUN mg/dL 26*   CREATININE mg/dL 0 88   CALCIUM mg/dL 9 3   TOTAL PROTEIN g/dL 7 0   BILIRUBIN TOTAL mg/dL 0 70   ALK PHOS U/L 132*   ALT U/L 28   AST U/L 23   GLUCOSE RANDOM mg/dL 247*           Results from last 7 days  Lab Units 06/25/18  0538 06/24/18  2117 06/24/18  1715   POC GLUCOSE mg/dl 240* 315* 247*             * I Have Reviewed All Lab Data Listed Above  * Additional Pertinent Lab Tests Reviewed:  Arun 66 Admission Reviewed    Imaging:    Imaging Reports Reviewed Today Include: None   Imaging Personally Reviewed by Myself Includes:  None    Recent Cultures (last 7 days):           Last 24 Hours Medication List:     Current Facility-Administered Medications:  amLODIPine 10 mg Oral Daily Jaylen Carson MD   aspirin 81 mg Oral Daily Jaylen Carson MD   atorvastatin 40 mg Oral Daily Jaylen Carson MD   cholecalciferol 1,000 Units Oral Daily Jaylen Carson MD   fluticasone 2 spray Each Nare BID Jaylen Carson MD   heparin (porcine) 5,000 Units Subcutaneous [facility] Jaylen [facility] MD   hydrALAZINE 5 mg Intravenous Q6H PRN Precious Farris MD   insulin glargine 40 Units Subcutaneous HS Precious Farris MD   insulin lispro 2-12 Units Subcutaneous TID [facility] Jaylen Carson MD   labetalol 200 mg Oral BID Jaylen Carson MD   sodium chloride (PF) 3 mL Intravenous PRN Radha Alcocer MD   spironolactone 25 mg Oral Daily Jaylen Carson MD   valsartan 80 mg Oral Daily Precious Farris MD        Today, Patient Was Seen By: Asael Berry MD    ** Please Note: Dictation voice to text software may have been used in the creation of this document   **

## 2018-06-25 NOTE — ASSESSMENT & PLAN NOTE
Lab Results   Component Value Date    HGBA1C 8 2 (H) 09/20/2017       Recent Labs      06/24/18   1715  06/24/18   2117  06/25/18   0538   POCGLU  247*  315*  240*       Blood Sugar Average: Last 72 hrs:    - Placed back on home dose Lantus, per patient, she has been hyperglycemic and was increased to 40U Lantus 3 months ago and is still uncontrolled, no need to decrease Lantus inpatient since she continues to be hyperglycemic which may be contributing to orthostasis/autonomic dysfunction  - ISS and accucheck   - Consult Diabetic education   - Hba1c ordered  - diabetic diet

## 2018-06-25 NOTE — CASE MANAGEMENT
Initial Clinical Review    Thank you,  [facility] in the [facility] by Maco Hernández for 2017  Network Utilization Review Department  Phone: [phone]; Fax [phone]  ATTENTION: The Network Utilization Review Department is now centralized for our 7 Facilities  Make a note that we have a new phone and fax numbers for our Department  Please call with any questions or concerns to [phone] and carefully follow the prompts so that you are directed to the right person  All voicemails are confidential  Fax any determinations, approvals, denials, and requests for initial or continue stay review clinical to [phone]  Due to HIGH CALL volume, it would be easier if you could please send faxed requests to expedite your requests and in part, help us provide discharge notifications faster  Admission: Date/Time/Statement: 6/24/18 @ 1409     Orders Placed This Encounter   Procedures    Inpatient Admission (expected length of stay for this patient is greater than two midnights)     Standing Status:   Standing     Number of Occurrences:   1     Order Specific Question:   Admitting Physician     Answer:   Sabi Schulz     Order Specific Question:   Level of Care     Answer:   Med Surg [16]     Order Specific Question:   Estimated length of stay     Answer:   More than 2 Midnights     Order Specific Question:   Certification     Answer:   I certify that inpatient services are medically necessary for this patient for a duration of greater than two midnights  See H&P and MD Progress Notes for additional information about the patient's course of treatment         ED: Date/Time/Mode of Arrival:   ED Arrival Information     Expected Arrival Acuity Means of Arrival Escorted By Service Admission Type    - 6/24/2018 10:51 Urgent Walk-In Self General Medicine Urgent    Arrival Complaint    SOB          Chief Complaint:   Chief Complaint   Patient presents with   Jessica Garcia Shortness of Breath     pt  states that she became dizzy then became short of breath - started approx 30 minutes ago - anxious on arrival        History of Illness:  59 y o  female who presents with SOB, that worsened this morning  Patient has been experiencing SOB for several months however she had worsening SOB this morning  Patient was working around the house when she started to feel dizzy and had blurry vision during this episode  This episode lasted > 5 mins  No alleviating factors, No exacerbating factors  Patient say that this happens sometimes  Denies chest pain, ABD pain, no LE tenderness, N/V      ED Vital Signs:   ED Triage Vitals [06/24/18 1055]   Temperature Pulse Respirations Blood Pressure SpO2   (!) 96 7 °F (35 9 °C) 78 (!) 28 (!) 185/100 97 %      Temp Source Heart Rate Source Patient Position - Orthostatic VS BP Location FiO2 (%)   Temporal Monitor Sitting Left arm --      Pain Score       No Pain        Wt Readings from Last 1 Encounters:   06/24/18 (!) 137 kg (302 lb 7 5 oz)       Vital Signs:  06/24 0701  06/25 0700 06/25 0701  06/25 1424  Most Recent     Temperature (°F) 96 5-97 5 96 9-97 9  97 4 (36 3)    Pulse 65-78 69-87  72    Respirations 18-28 18  18    Blood Pressure 140//107 129//83  130/70    SpO2 (%)  96-98  98        Abnormal Labs/Diagnostic Test Results: Glucose 209, Alk phos 132, D-simer 0 54    CXR -- Cardiomegaly  No acute cardiopulmonary disease    EKG -- NSR  Echo    ED Treatment:   Medication Administration from 06/24/2018 1051 to 06/24/2018 1647       Date/Time Order Dose Route Action Action by Comments     06/24/2018 1146 nitroglycerin (NITRO-BID) 2 % TD ointment 1 inch 1 inch Topical Given Von Alvares RN      06/24/2018 1147 albuterol inhalation solution 2 5 mg 2 5 mg Nebulization Given Von Alvares RN      06/24/2018 1147 ipratropium (ATROVENT) 0 02 % inhalation solution 0 5 mg 0 5 mg Nebulization Given Von Alvares RN           Past Medical/Surgical History:     Past Medical History:   Diagnosis Date    Anemia     Diabetes mellitus (HCC)     Dyspnea on exertion     Hyperlipidemia     Hypertension     Legally blind 2010    Miscarriage     Seizures ([facility] [address] )        Admitting Diagnosis: Shortness of breath [R06 02]  Essential hypertension [I10]  Postural dizziness with presyncope [R42, R55]  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, with long-term current use of insulin (HCC) [E11 39, Z79 4, E11 65]    Age/Sex: 59 y o  female    Assessment/Plan:   * Hypertensive urgency   Assessment & Plan     Patient has Hx of poorly controlled HTN  - Came to ER with SOB and was found to have HTN Urgency   - Will order ECG stat as patient also C/O SOB and dizziness   - Restart patients BP medications with holding parameters  - Poor dieting will may have played factor will get Dietary Education          Shortness of breath   Assessment & Plan     Patient had SOB starting this morning   Most likely 2/2 HTN Urgency   - Patient does not need any supplemental O2   - No labored breathing   - D- Dimer pending, [facility] will be calculated at that time          Hypertension   Assessment & Plan     As above          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, with long-term current use of insulin ([facility])   Assessment & Plan             Lab Results   Component Value Date     HGBA1C 8 2 (H) 09/20/2017         No results for input(s): POCGLU in the last 72 hours      Blood Sugar Average: Last 72 hrs:     Will reduce patients Lantus to 30U qhs instead of 40 as she will be in the hospital ( controlled environment)   Will place on ISS and accucheck   - Consult Diabetic education   - Hba1c               Admission Orders:  M/S/Tele unit  Telem  Echo  Orthostatic bp's  Fingerstick glucose checks qid w/ ssi  Cardiac diet  SCD's  Up with assistance  PT/OT estevan    Scheduled Meds:   Current Facility-Administered Medications:  acetaminophen 650 mg Oral Q6H PRN Steve Alejandro MD   amLODIPine 10 mg Oral Daily Jaylen Carson MD   aspirin 81 mg Oral Daily Jaylen Carson MD   atorvastatin 40 mg Oral Daily Jaylen Carson MD   cholecalciferol 1,000 Units Oral Daily Jaylen Carson MD   fluticasone 2 spray Each Nare BID Jaylen Carson MD   heparin (porcine) 5,000 Units Subcutaneous [facility] Jaylen Carson MD   insulin glargine 45 Units Subcutaneous HS Steve Alejandro MD   insulin lispro 4 Units Subcutaneous TID With Meals Steve Alejandro MD   labetalol 200 mg Oral BID Steve Alejandro MD   perflutren lipid microsphere 0 8 mL/min Intravenous Once in imaging Steve Alejandro MD   sodium chloride (PF) 3 mL Intravenous PRN Radha Alcocer MD   spironolactone 25 mg Oral Daily Jaylen Carson MD   valsartan 40 mg Oral Daily Steve Alejandro MD     PRN Meds:   acetaminophen    perflutren lipid microsphere

## 2018-06-26 VITALS
RESPIRATION RATE: 16 BRPM | SYSTOLIC BLOOD PRESSURE: 160 MMHG | WEIGHT: 293 LBS | DIASTOLIC BLOOD PRESSURE: 88 MMHG | BODY MASS INDEX: 44.41 KG/M2 | HEIGHT: 68 IN | OXYGEN SATURATION: 97 % | HEART RATE: 75 BPM | TEMPERATURE: 96.8 F

## 2018-06-26 LAB
ANION GAP SERPL CALCULATED.3IONS-SCNC: 7 MMOL/L (ref 5–14)
BUN SERPL-MCNC: 26 MG/DL (ref 5–25)
CALCIUM SERPL-MCNC: 9.8 MG/DL (ref 8.4–10.2)
CHLORIDE SERPL-SCNC: 98 MMOL/L (ref 97–108)
CO2 SERPL-SCNC: 32 MMOL/L (ref 22–30)
CREAT SERPL-MCNC: 0.89 MG/DL (ref 0.6–1.2)
ERYTHROCYTE [DISTWIDTH] IN BLOOD BY AUTOMATED COUNT: 15.5 %
GFR SERPL CREATININE-BSD FRML MDRD: 82 ML/MIN/1.73SQ M
GLUCOSE SERPL-MCNC: 284 MG/DL (ref 70–99)
GLUCOSE SERPL-MCNC: 297 MG/DL (ref 70–99)
GLUCOSE SERPL-MCNC: 298 MG/DL (ref 70–99)
GLUCOSE SERPL-MCNC: 315 MG/DL (ref 70–99)
HCT VFR BLD AUTO: 37.2 % (ref 36–46)
HGB BLD-MCNC: 11.9 G/DL (ref 12–16)
MCH RBC QN AUTO: 25.6 PG (ref 26–34)
MCHC RBC AUTO-ENTMCNC: 31.8 G/DL (ref 31–36)
MCV RBC AUTO: 80 FL (ref 80–100)
PLATELET # BLD AUTO: 311 THOUSANDS/UL (ref 150–450)
PMV BLD AUTO: 9.1 FL (ref 8.9–12.7)
POTASSIUM SERPL-SCNC: 3.9 MMOL/L (ref 3.6–5)
RBC # BLD AUTO: 4.63 MILLION/UL (ref 4–5.2)
SODIUM SERPL-SCNC: 137 MMOL/L (ref 137–147)
WBC # BLD AUTO: 8 THOUSAND/UL (ref 4.5–11)

## 2018-06-26 PROCEDURE — G8988 SELF CARE GOAL STATUS: HCPCS

## 2018-06-26 PROCEDURE — 82948 REAGENT STRIP/BLOOD GLUCOSE: CPT

## 2018-06-26 PROCEDURE — G8987 SELF CARE CURRENT STATUS: HCPCS

## 2018-06-26 PROCEDURE — 85027 COMPLETE CBC AUTOMATED: CPT | Performed by: FAMILY MEDICINE

## 2018-06-26 PROCEDURE — 80048 BASIC METABOLIC PNL TOTAL CA: CPT | Performed by: FAMILY MEDICINE

## 2018-06-26 PROCEDURE — 97165 OT EVAL LOW COMPLEX 30 MIN: CPT

## 2018-06-26 PROCEDURE — 99239 HOSP IP/OBS DSCHRG MGMT >30: CPT | Performed by: INTERNAL MEDICINE

## 2018-06-26 PROCEDURE — G8989 SELF CARE D/C STATUS: HCPCS

## 2018-06-26 RX ORDER — BLOOD PRESSURE TEST KIT
KIT MISCELLANEOUS
Qty: 1 EACH | Refills: 0 | Status: SHIPPED | OUTPATIENT
Start: 2018-06-26

## 2018-06-26 RX ORDER — VALSARTAN AND HYDROCHLOROTHIAZIDE 160; 12.5 MG/1; MG/1
1 TABLET, FILM COATED ORAL DAILY
Qty: 30 TABLET | Refills: 2 | Status: SHIPPED | OUTPATIENT
Start: 2018-06-26 | End: 2018-12-12

## 2018-06-26 RX ADMIN — FLUTICASONE PROPIONATE 2 SPRAY: 50 SPRAY, METERED NASAL at 09:02

## 2018-06-26 RX ADMIN — HEPARIN SODIUM 5000 UNITS: 5000 INJECTION, SOLUTION INTRAVENOUS; SUBCUTANEOUS at 06:34

## 2018-06-26 RX ADMIN — LABETALOL HYDROCHLORIDE 200 MG: 100 TABLET, FILM COATED ORAL at 09:01

## 2018-06-26 RX ADMIN — VITAMIN D, TAB 1000IU (100/BT) 1000 UNITS: 25 TAB at 09:01

## 2018-06-26 RX ADMIN — INSULIN LISPRO 4 UNITS: 100 INJECTION, SOLUTION INTRAVENOUS; SUBCUTANEOUS at 09:00

## 2018-06-26 RX ADMIN — SPIRONOLACTONE 25 MG: 25 TABLET, FILM COATED ORAL at 09:01

## 2018-06-26 RX ADMIN — INSULIN LISPRO 4 UNITS: 100 INJECTION, SOLUTION INTRAVENOUS; SUBCUTANEOUS at 12:32

## 2018-06-26 RX ADMIN — ASPIRIN 81 MG: 81 TABLET, COATED ORAL at 09:02

## 2018-06-26 RX ADMIN — HEPARIN SODIUM 5000 UNITS: 5000 INJECTION, SOLUTION INTRAVENOUS; SUBCUTANEOUS at 14:59

## 2018-06-26 RX ADMIN — AMLODIPINE BESYLATE 10 MG: 5 TABLET ORAL at 09:01

## 2018-06-26 NOTE — DISCHARGE SUMMARY
Discharge- Fiona Winters 1960, 59 y o  female MRN: [id number]    Unit/Bed#: 7T [facility] 713-02 Encounter: [id number]    Primary Care Provider: Wendy Araujo MD   Date and time admitted to hospital: 6/24/2018 10:54 AM        Admitting Provider:  Maryjo Suazo MD  Discharge Provider:  Bartolo Botello DO  Admission Date: 6/24/2018       Discharge Date: 06/26/18   LOS: 2  Primary Care Physician at Discharge: Wendy Araujo, [address] [place] [address] COURSE:  Fiona Winters is a y o  female who presented the hospital with chest discomfort and dizziness  She was found to be profoundly hypertensive  During hospitalization she was continued on her home medicines however she actually was found to have orthostatic hypotension  Her valsartan was decreased and HCTZ was discontinued  Due to concerns of elevated blood pressures after discharge as patient presented with profound hypertension, she will be restarted on her valsartan/HCT but at half dosing  She did complain of some cramping in hands which I advised to follow up with Neurology to establish care  Case was discussed with family member at time of discharge  All questions have been answered to satisfaction  DISCHARGE DIAGNOSES    Orthostatic hypotension   Assessment & Plan    The patient's valsartan/HCT will be decreased at time of discharge  Peripheral neuropathy   Assessment & Plan    May benefit from Lyrica or gabapentin  Suggest follow-up with neurology post discharge given hand cramps        Hypertension   Assessment & Plan    Blood pressure high at time of arrival   Question compliance at home  During hospitalization she was found out orthostatic hypotension  Her valsartan/HCT was held  She continued to have mild orthostasis which may be from dysautonomia from uncontrolled diabetes  Advised patient to restart valsartan/HCT but at half dosing:  160/12 5  Can continue labetalol amlodipine spironolactone at previous dosing    Advised to obtain blood pressure cuff  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, with long-term current use of insulin [facility])   Assessment & Plan    Lab Results   Component Value Date    HGBA1C 11 1 (H) 06/25/2018       Recent Labs      06/25/18   1955  06/26/18   0531  06/26/18   1208  06/26/18   1604   POCGLU  277*  297*  298*  284*     Accu-Cheks uncontrolled  Suspect dietary noncompliance at home given A1c greater than 11+  Continue basaglar and Humalog as previously taken  Follow up with Endocrinology  CONSULTING PROVIDERS   None    PROCEDURES PERFORMED  X-ray Chest 2 Views  Result Date: 6/24/2018  Impression: Cardiomegaly  No acute cardiopulmonary disease  Workstation performed: [id number]     Vas Lower Limb Venous Duplex Study, Complete Bilateral  Result Date: 6/25/2018  RIGHT LOWER LIMB: No evidence of acute or chronic deep vein thrombosis  No evidence of superficial thrombophlebitis noted  Doppler evaluation shows a normal response to augmentation maneuvers  Popliteal, posterior tibial and anterior tibial arterial Doppler waveforms are triphasic  LEFT LOWER LIMB: No evidence of acute or chronic deep vein thrombosis  No evidence of superficial thrombophlebitis noted  Doppler evaluation shows a normal response to augmentation maneuvers  Popliteal, posterior tibial and anterior tibial arterial Doppler waveforms are triphasic  Technical findings were given to Dr Hyacinth Ruano      SIGNATURE: Electronically Signed by: Fidencio Bloch on 2018-06-25 08:29:03 PM      Other Pertinent Test Results    Results from last 7 days  Lab Units 06/26/18  0502 06/25/18  0523 06/24/18  1107   WBC Thousand/uL 8 00 7 80 7 80   HEMOGLOBIN g/dL 11 9* 11 1* 12 3   HEMATOCRIT % 37 2 34 4* 37 7   MCV fL 80 79* 78*   PLATELETS Thousands/uL 311 295 308       Results from last 7 days  Lab Units 06/26/18  0502 06/25/18  0523 06/24/18  1108   SODIUM mmol/L 137 137 140   POTASSIUM mmol/L 3 9 3 8 3 8 CHLORIDE mmol/L 98 101 102   CO2 mmol/L 32* 30 30   ANION GAP mmol/L 7 6 8   BUN mg/dL 26* 26* 24   CREATININE mg/dL 0 89 0 88 1 01   CALCIUM mg/dL 9 8 9 3 9 6   BILIRUBIN TOTAL mg/dL  --  0 70 0 80   ALK PHOS U/L  --  132* 132*   ALT U/L  --  28 37   AST U/L  --  23 22   EGFR ml/min/1 73sq m 82 83 70   GLUCOSE RANDOM mg/dL 315* 247* 209*       Results from last 7 days  Lab Units 06/24/18  1108   TROPONIN I ng/mL <0 01       Results from last 7 days  Lab Units 06/24/18  1129   NT-PRO BNP pg/mL 90 3        Results from last 7 days  Lab Units 06/24/18  1108   D DIMER mg/L 0 54*       Results from last 7 days  Lab Units 06/26/18  1208 06/26/18  0531 06/25/18  1955 06/25/18  1550 06/25/18  1138 06/25/18  0538 06/24/18  2117 06/24/18  1715   POC GLUCOSE mg/dl 298* 297* 277* 284* 279* 240* 315* 247*       Results from last 7 days  Lab Units 06/25/18  0523   HEMOGLOBIN A1C % 11 1*       Results from last 7 days  Lab Units 06/24/18  1108   TSH 3RD GENERATON uIU/mL 1 680       PHYSICAL EXAM:  Vitals:   Blood Pressure: 160/88 (06/26/18 0800)  Pulse: 75 (06/26/18 0800)  Temperature: (!) 96 8 °F (36 °C) (06/26/18 0800)  Temp Source: Temporal (06/26/18 0800)  Respirations: 16 (06/26/18 0405)  Height: 5' 8" (172 7 cm) (06/24/18 1645)  Weight - Scale: (!) 137 kg (301 lb 9 4 oz) (06/26/18 0539)  SpO2: 97 % (06/26/18 0800)    General appearance: alert, appears stated age and cooperative  Skin: Skin color, texture, turgor normal  No rashes or lesions  Head: atraumatic  Eyes: conjunctivae/corneas clear  PERRL, EOM's intact    Lungs: clear to auscultation bilaterally  Heart: regular rate and rhythm  Abdomen: soft obese nontender  Back: range of motion normal  Extremities: extremities normal, atraumatic, no cyanosis or edema  Neurologic: Grossly normal    Planned Re-admission: No  Discharge Disposition: Home/Self Care    Test Results Pending at Discharge:   Incidental findings:     Medications   Please see After Visit Summary for reconciled discharge medications provided to patient and family  Diet restrictions: Cardiac diet   Activity restrictions: No strenuous activity  Discharge Condition: fair    Outpatient Follow-Up  1  Grace Lucas MD [facility] / [address] [place] [address] [phone] - follow-up within one week  2  Magdalena Tran MD endocrinology  Follow-up as scheduled  3  [facility]  Call for evaluation regarding cramping and hands    Code Status: Level 1 - Full Code  Discharge Statement   I spent 35 minutes discharging the patient  This time was spent on the day of discharge  Greater than 50% of total time was spent with the patient and / or family counseling and / or coordination of care

## 2018-06-26 NOTE — OCCUPATIONAL THERAPY NOTE
Occupational Therapy Evaluation      Lissette Sara    6/26/2018    Patient Active Problem List   Diagnosis    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, with long-term current use of insulin ([address] )    Hypertension    Seizures ([address] )    Enlarged pulmonary artery (HCC)    Aortic dilatation (HCC)    Anemia    Decreased pulses in feet    Diabetes mellitus type 2 with complications, uncontrolled ([address] )    Dyslipidemia    Fatigue    Hyperlipidemia    Hypoglycemia    Microalbuminuria    Mild obstructive sleep apnea    Obesity    Peripheral neuropathy    Prolonged QT interval    Scalp avulsion    Visual impairment in both eyes    Vitamin D deficiency    Lung nodules    Orthostatic hypotension       Past Medical History:   Diagnosis Date    Anemia     Diabetes mellitus ([address] )     Dyspnea on exertion     Hyperlipidemia     Hypertension     Legally blind 2010    Miscarriage     x 3    Seizures ([address] )        Past Surgical History:   Procedure Laterality Date    CATARACT EXTRACTION Bilateral     august and september    EYE SURGERY      HYSTERECTOMY         Vitals: seated EOB end of session: BP: 160/88, HR 84bpm, SPo2 97%  Nurse Ceci Reyes made aware  06/26/18 0900   Note Type   Note type Eval only   Pain Assessment   Pain Assessment No/denies pain   Pain Score No Pain   Home Living   Type of Home Apartment  (2nd flr  6+6 JESSICA with R rail)   Home Layout One level;Stairs to enter with rails   Bathroom Shower/Tub Tub/shower unit   [address] [place] [address] Dr chair   [address] 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; insurance sales   Intrinsic Gratification enjoys dancing, music, playing cards and going to the mall    Subjective   Subjective "I'm ready to get out of here and back to work"   ADL   Where Assessed Edge of bed   Eating Assistance [address]  7  Independent   Transfers   Sit to Stand 7  Independent   Stand to Sit 7  Independent   Stand pivot 7  Independent   Toilet transfer 7  Independent   Functional Mobility   Functional Mobility 5  Supervision   Additional Comments S for evaluation; pt notes she has been walking around room Indep  and taking self to bathroom  able to complete safely  defer to PT for possible use of cane for longer distances  Balance   Static Sitting Normal   Dynamic Sitting Normal   Static Standing Normal   Dynamic Standing Good   Ambulatory Fair +   Activity Tolerance   Activity Tolerance Patient tolerated treatment well   RUE Assessment   RUE Assessment WNL   LUE Assessment   LUE Assessment WNL   Hand Function   Gross Motor Coordination Functional   Vision-Basic Assessment   Current Vision Wears glasses only for reading   Visual History (Pt notes later in eval that she is legally blind)   Cognition   Overall Cognitive Status [facility]   Arousal/Participation Alert; Responsive; Cooperative   Attention Within functional limits   Orientation Level Oriented X4   Following Commands Follows all commands and directions without difficulty   Assessment   Prognosis Good   Assessment Ot completed expanded social and medical review within OT eval; Pt performing all self care and mobility within room at I level at this time and does not demonstrate a need for OT services  If any functional changes occur please re-consult  Pt is of low complexity and  could return [facility] at [facility]      Goals   Patient Goals "to get out of here"    Recommendation   Recommendation (PT for stairs and amb  long dis  )   OT Discharge Recommendation Home independent   OT - OK to Discharge Yes   Barthel Index   Feeding 10   Bathing 5   Grooming Score 5   Dressing Score 10   Bladder Score 10   Bowels Score 10   Toilet Use Score 10   Transfers (Bed/Chair) Score 15   Mobility (Level Surface) Score 15   Stairs Score 0   Barthel Index Score 29 L  V  [address], [place]  6/26/2018

## 2018-06-26 NOTE — ASSESSMENT & PLAN NOTE
Lab Results   Component Value Date    HGBA1C 11 1 (H) 06/25/2018       Recent Labs      06/25/18   1955  06/26/18   0531  06/26/18   1208  06/26/18   1604   POCGLU  277*  297*  298*  284*     Accu-Cheks uncontrolled  Suspect dietary noncompliance at home given A1c greater than 11+  Continue basaglar and Humalog as previously taken  Follow up with Endocrinology

## 2018-06-26 NOTE — PROGRESS NOTES
IV removed with tip intact and pressure held to prevent bleeding  Telemetry monitor removed  Patient has prescriptions and discharge instructions  Voices understanding  Has follow up appointment on July 13 for diabetes  Indicated to patient and highlighted in yellow on discharge instructions

## 2018-06-26 NOTE — ASSESSMENT & PLAN NOTE
May benefit from Lyrica or gabapentin    Suggest follow-up with neurology post discharge given hand cramps

## 2018-06-26 NOTE — NURSING NOTE
Pt continues to deny any c/o  No acute distress noted  No changes in assessment  Call bell in reach, will continue to monitor

## 2018-06-26 NOTE — NURSING NOTE
Pt continues to deny any c/o  + tilt during orthostatic BP's  Pt denied any dizziness with change of position  No acute distress noted  No other changes from previous shift assessment  Call bell in reach, will continue to monitor

## 2018-06-26 NOTE — ASSESSMENT & PLAN NOTE
Blood pressure high at time of arrival   Question compliance at home  During hospitalization she was found out orthostatic hypotension  Her valsartan/HCT was held  She continued to have mild orthostasis which may be from dysautonomia from uncontrolled diabetes  Advised patient to restart valsartan/HCT but at half dosing:  160/12 5  Can continue labetalol amlodipine spironolactone at previous dosing  Advised to obtain blood pressure cuff

## 2018-06-27 ENCOUNTER — TRANSITIONAL CARE MANAGEMENT (OUTPATIENT)
Dept: INTERNAL MEDICINE CLINIC | Facility: CLINIC | Age: 58
End: 2018-06-27

## 2018-06-27 DIAGNOSIS — E11.65 TYPE 2 DIABETES MELLITUS WITH HYPERGLYCEMIA, WITH LONG-TERM CURRENT USE OF INSULIN (HCC): Primary | ICD-10-CM

## 2018-06-27 DIAGNOSIS — Z79.4 TYPE 2 DIABETES MELLITUS WITH HYPERGLYCEMIA, WITH LONG-TERM CURRENT USE OF INSULIN (HCC): Primary | ICD-10-CM

## 2018-06-27 PROCEDURE — 93306 TTE W/DOPPLER COMPLETE: CPT | Performed by: INTERNAL MEDICINE

## 2018-06-27 RX ORDER — AMLODIPINE BESYLATE 10 MG/1
TABLET ORAL
COMMUNITY
End: 2018-06-27 | Stop reason: SDUPTHER

## 2018-06-27 RX ORDER — VALSARTAN AND HYDROCHLOROTHIAZIDE 320; 12.5 MG/1; MG/1
TABLET, FILM COATED ORAL
COMMUNITY
End: 2018-06-27 | Stop reason: SDUPTHER

## 2018-06-27 RX ORDER — PROMETHAZINE HYDROCHLORIDE AND CODEINE PHOSPHATE 6.25; 1 MG/5ML; MG/5ML
SOLUTION ORAL
COMMUNITY
End: 2018-06-27 | Stop reason: SDUPTHER

## 2018-06-27 RX ORDER — VALSARTAN AND HYDROCHLOROTHIAZIDE 320; 12.5 MG/1; MG/1
1 TABLET, FILM COATED ORAL DAILY
Refills: 3 | COMMUNITY
Start: 2018-06-17 | End: 2018-06-27 | Stop reason: SDUPTHER

## 2018-06-27 RX ORDER — SPIRONOLACTONE 25 MG/1
TABLET ORAL
COMMUNITY
End: 2018-06-27 | Stop reason: SDUPTHER

## 2018-06-27 RX ORDER — ACETAMINOPHEN 500 MG
TABLET ORAL
COMMUNITY
End: 2018-06-27 | Stop reason: SDUPTHER

## 2018-06-27 RX ORDER — LABETALOL 200 MG/1
TABLET, FILM COATED ORAL
COMMUNITY
End: 2018-06-27 | Stop reason: SDUPTHER

## 2018-06-27 RX ORDER — ATORVASTATIN CALCIUM 40 MG/1
TABLET, FILM COATED ORAL
COMMUNITY
End: 2018-06-27 | Stop reason: SDUPTHER

## 2018-06-27 RX ORDER — FLUTICASONE PROPIONATE 50 MCG
SPRAY, SUSPENSION (ML) NASAL
COMMUNITY
End: 2018-06-27 | Stop reason: SDUPTHER

## 2018-07-02 ENCOUNTER — OFFICE VISIT (OUTPATIENT)
Dept: INTERNAL MEDICINE CLINIC | Facility: CLINIC | Age: 58
End: 2018-07-02
Payer: COMMERCIAL

## 2018-07-02 VITALS
DIASTOLIC BLOOD PRESSURE: 88 MMHG | BODY MASS INDEX: 41.95 KG/M2 | WEIGHT: 293 LBS | SYSTOLIC BLOOD PRESSURE: 138 MMHG | HEART RATE: 78 BPM | TEMPERATURE: 97 F | HEIGHT: 70 IN

## 2018-07-02 DIAGNOSIS — E78.49 OTHER HYPERLIPIDEMIA: Primary | ICD-10-CM

## 2018-07-02 DIAGNOSIS — E11.42 DIABETIC POLYNEUROPATHY ASSOCIATED WITH TYPE 2 DIABETES MELLITUS (HCC): ICD-10-CM

## 2018-07-02 DIAGNOSIS — IMO0002 UNCONTROLLED TYPE 2 DIABETES MELLITUS WITH OTHER OPHTHALMIC COMPLICATION, WITH LONG-TERM CURRENT USE OF INSULIN: ICD-10-CM

## 2018-07-02 PROCEDURE — 99213 OFFICE O/P EST LOW 20 MIN: CPT | Performed by: INTERNAL MEDICINE

## 2018-07-02 RX ORDER — ATORVASTATIN CALCIUM 40 MG/1
40 TABLET, FILM COATED ORAL DAILY
Qty: 90 TABLET | Refills: 1 | Status: SHIPPED | OUTPATIENT
Start: 2018-07-02 | End: 2018-08-28 | Stop reason: SDUPTHER

## 2018-07-02 RX ORDER — GABAPENTIN 100 MG/1
100 CAPSULE ORAL 2 TIMES DAILY
Qty: 180 CAPSULE | Refills: 0 | Status: SHIPPED | OUTPATIENT
Start: 2018-07-02 | End: 2018-10-12 | Stop reason: SDUPTHER

## 2018-07-02 NOTE — PROGRESS NOTES
Assessment/Plan:     Diagnoses and all orders for this visit:    Other hyperlipidemia  -     atorvastatin (LIPITOR) 40 mg tablet; Take 1 tablet (40 mg total) by mouth daily    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, with long-term current use of insulin (HCC)  -     insulin glargine (BASAGLAR KWIKPEN) 100 units/mL injection pen; Inject 43 Units under the skin daily    Diabetic polyneuropathy associated with type 2 diabetes mellitus (HCC)  -     gabapentin (NEURONTIN) 100 mg capsule; Take 1 capsule (100 mg total) by mouth 2 (two) times a day    Today we have focused on TCM visit  We will consider some testing to r/o other causes of neuropathy next visit  Patient advised on need to increase activity and exercise  Need to quickly get serum glucose under control  That she should consider us to be her PCP location  To make an apt to RTC in 3 weeks  Subjective: Patient presents to the clinic for transition of care     Patient ID: Sandy Ospina is a 59 y o  female  HPI   she was in the hospital from June 24, 2018 to June 26, 2018  Hard discharge diagnosis included orthostatic hypotension, peripheral neuropathy, and hypertension  For the review of her records show her hemoglobin A1c at 11 1%  She reports high serum glucose has been high for several months and that she goes to the endocrinologist for treatment  She reports the see her once every 3 months  She is advised that if she cannot make it more frequently to the endocrinology it is okay for her to come to our clinic for PCP care so we can help to better manage her serum glucose and insulin  She is currently taking Lantus 40 units once a day and last took it 3 days ago  We have sent refills to the pharmacy  And asked that she increase it to 43 units daily     She also takes Humalog insulin 14 units 3 times a day  and metformin 1000 mg at bedtime  She says taking metformin BID causes abd discomfort   Her next apt for endocrinology is next Monday (1 week)  She is advised to continue taking all meds as direcred  Atorvastatin refilled for Hyperlipidemia  She reports she has all other meds  Says when she went to the hospital, she could not breath well, but now it is a lot better  Says she does not have a pcp; she is advised to come here for pcp care  Lastly she reports ongoing neuropathy from chronic uncontrolled DM  Occurs on her hands and feet  She will be started on gabapentin but she is advised the most important thing for her to do, will be to get her serum glucose under control  The following portions of the patient's history were reviewed and updated as appropriate: allergies, current medications, past family history, past medical history, past social history, past surgical history and problem list     Review of Systems   Constitutional: Negative for appetite change, diaphoresis, fatigue and unexpected weight change  Respiratory: Positive for shortness of breath (a little, with exertion)  Negative for cough, chest tightness and wheezing  Cardiovascular: Negative for chest pain and palpitations  Gastrointestinal: Negative for abdominal pain, blood in stool, constipation and diarrhea  Endocrine: Negative for cold intolerance, polydipsia, polyphagia and polyuria  Neurological: Positive for numbness (and tingling, on her hands and feet)  Negative for dizziness, tremors, syncope and facial asymmetry  Objective:      /88 (BP Location: Left arm, Patient Position: Sitting, Cuff Size: Standard)   Pulse 78   Temp (!) 97 °F (36 1 °C)   Ht 5' 9 6" (1 768 m)   Wt (!) 138 kg (303 lb 5 7 oz)   BMI 44 03 kg/m²        Physical Exam   Constitutional: She appears well-developed and well-nourished  No distress  Cardiovascular: Normal rate, regular rhythm and normal heart sounds  No murmur heard  Pulmonary/Chest: Effort normal and breath sounds normal  No respiratory distress  She has no wheezes  Musculoskeletal:   Walks with a cane   Skin: She is not diaphoretic  Psychiatric: She has a normal mood and affect   Her behavior is normal  Judgment and thought content normal

## 2018-07-02 NOTE — PATIENT INSTRUCTIONS
Basic Carbohydrate Counting   AMBULATORY CARE:   Carbohydrate counting  is a way to plan your meals by counting the amount of carbohydrate in foods  Carbohydrates are the sugars, starches, and fiber found in fruit, grains, vegetables, and milk products  Carbohydrates increase your blood sugar levels  Carbohydrate counting can help you eat the right amount of carbohydrate to keep your blood sugar levels under control  What you need to know about planning meals using carbohydrate counting:  · A dietitian or healthcare provider will help you develop a healthy meal plan that works best for you  You will be taught how much carbohydrate to eat or drink for each meal and snack  Your meal plan will be based on your age, weight, usual food intake, and physical activity level  If you have diabetes, it will also include your blood sugar levels and diabetes medicine  Once you know how much carbohydrate you should eat, you can decide what type of food you want to eat  · You will need to know what foods contain carbohydrate and how much they contain  Keep track of the amount of carbohydrate in meals and snacks in order to follow your meal plan  Do not avoid carbohydrates or skip meals  Your blood sugar may fall too low if you do not eat enough carbohydrate or you skip meals  Foods that contain carbohydrate:   · Breads:  Each serving of food listed below contains about 15 g of carbohydrate   ¨ 1 slice of bread (1 ounce) or 1 flour or corn tortilla (6 inch)    ¨ ½ of a hamburger bun or ¼ of a large bagel (about 1 ounce)    ¨ 1 pancake (about 4 inches across and ¼ inch thick)    · Cereals and grains:  Serving sizes of ready-to-eat cereals vary  Look at the serving size and the total carbohydrate amount listed on the food label  Each serving of food listed below contains about 15 g of carbohydrate       ¨ ¾ cup of dry, unsweetened, ready-to-eat cereal or ¼ cup of low-fat granola     ¨ ½ cup of oatmeal or other cooked cereal ¨ ? cup of cooked rice or pasta    · Starchy vegetables and beans:  Each serving of food listed below contains about 15 g of carbohydrate   ¨ ½ cup of corn, green peas, sweet potatoes, or mashed potatoes    ¨ ¼ of a large baked potato    ¨ ½ cup of beans, lentils, and peas (garbanzo, vee, kidney, white, split, black-eyed)    · Crackers and snacks:  Each serving of food listed below contains about 15 g of carbohydrate   ¨ 3 nae cracker squares or 8 animal crackers     ¨ 6 saltine-type crackers    ¨ 3 cups of popcorn or ¾ ounce of pretzels, potato chips, or tortilla chips    · Fruit:  Each serving of food listed below contains about 15 g of carbohydrate   ¨ 1 small (4 ounce) piece of fresh fruit or ¾ to 1 cup of fresh fruit    ¨ ½ cup of canned or frozen fruit, packed in natural juice    ¨ ½ cup (4 ounces) of unsweetened fruit juice    ¨ 2 tablespoons of dried fruit    · Desserts or sugary foods:  Each serving of food listed below contains about 15 g of carbohydrate   ¨ 2-inch square unfrosted cake or brownie     ¨ 2 small cookies    ¨ ½ cup of ice cream, frozen yogurt, or nondairy frozen yogurt    ¨ ¼ cup of sherbet or sorbet    ¨ 1 tablespoon of regular syrup, jam, or jelly    ¨ 2 tablespoons of light syrup    · Milk and yogurt:  Foods from the milk group contain about 12 g of carbohydrate per serving  ¨ 1 cup of fat-free or low-fat milk    ¨ 1 cup of soy milk    ¨ ? cup of fat-free, yogurt sweetened with artificial sweetener    · Non-starchy vegetables:  Each serving contains about 5 g of carbohydrate   Three servings of non-starch vegetables count as 1 carbohydrate serving  ¨ ½ cup of cooked vegetables or 1 cup of raw vegetables  This includes beets, broccoli, cabbage, cauliflower, cucumber, mushrooms, tomatoes, and zucchini    ¨ ½ cup of vegetable juice  How to use carbohydrate counting to plan meals:   · Count carbohydrate amounts using serving sizes:      ¨ Pasta dinner example:   You plan to have pasta, tossed salad, and an 8-ounce glass of milk  Your healthcare provider tells you that you may have 4 carbohydrate servings for dinner  One carbohydrate serving of pasta is ? cup  One cup of pasta will equal 3 carbohydrate servings  An 8-ounce glass of milk will count as 1 carbohydrate serving  These amounts of food would equal 4 carbohydrate servings  One cup of tossed salad does not count toward your carbohydrate servings  · Count carbohydrate amounts using food labels:  Find the total amount of carbohydrate in a packaged food by reading the food label  Food labels tell you the serving size of the food and the total carbohydrate amount in each serving  Find the serving size on the food label and then decide how many servings you will eat  Multiply the number of servings you plan to eat by the carbohydrate amount per serving  ¨ Granola bar snack example: Your meal plan allows you to have 2 carbohydrate servings (30 grams) of carbohydrate for a snack  You plan to eat 1 package of granola bars, which contains 2 bars  According to the food label, the serving size of food in this package is 1 bar  Each serving (1 bar) contains 25 grams of carbohydrate  The total amount of carbohydrate in this package of granola bars would be 50 g  Based on your meal plan, you should eat only 1 bar  Follow up with your healthcare provider as directed:  Write down your questions so you remember to ask them during your visits  © 2017 [employer] Information is for End User's use only and may not be sold, redistributed or otherwise used for commercial purposes  All illustrations and images included in CareNotes® are the copyrighted property of [employer]  or Maco Hernández  The above information is an  only  It is not intended as medical advice for individual conditions or treatments   Talk to your doctor, nurse or pharmacist before following any medical regimen to see if it is safe and effective for you

## 2018-07-10 DIAGNOSIS — E11.65 TYPE 2 DIABETES MELLITUS WITH HYPERGLYCEMIA, UNSPECIFIED WHETHER LONG TERM INSULIN USE (HCC): Primary | ICD-10-CM

## 2018-07-23 ENCOUNTER — OFFICE VISIT (OUTPATIENT)
Dept: INTERNAL MEDICINE CLINIC | Facility: CLINIC | Age: 58
End: 2018-07-23
Payer: COMMERCIAL

## 2018-07-23 VITALS
TEMPERATURE: 97.6 F | DIASTOLIC BLOOD PRESSURE: 84 MMHG | SYSTOLIC BLOOD PRESSURE: 140 MMHG | BODY MASS INDEX: 43.4 KG/M2 | HEART RATE: 80 BPM | HEIGHT: 69 IN | WEIGHT: 293 LBS

## 2018-07-23 DIAGNOSIS — IMO0002 UNCONTROLLED TYPE 2 DIABETES MELLITUS WITH OTHER OPHTHALMIC COMPLICATION, WITH LONG-TERM CURRENT USE OF INSULIN: Primary | ICD-10-CM

## 2018-07-23 PROCEDURE — 99213 OFFICE O/P EST LOW 20 MIN: CPT | Performed by: INTERNAL MEDICINE

## 2018-07-23 RX ORDER — VALSARTAN AND HYDROCHLOROTHIAZIDE 320; 12.5 MG/1; MG/1
1 TABLET, FILM COATED ORAL DAILY
Refills: 3 | COMMUNITY
Start: 2018-07-18 | End: 2018-07-23 | Stop reason: SDUPTHER

## 2018-07-23 NOTE — PATIENT INSTRUCTIONS
Diabetes and Your Skin   WHAT YOU NEED TO KNOW:   Diabetes can affect every part of your body, including your skin  Diabetes that is not well controlled can damage blood vessels and nerves  Damage to blood vessels can make it hard for blood to flow to tissues and organs  A lack of blood flow to your skin can cause ulcers that are difficult to heal  Skin ulcers are also called sores  People with diabetes can also have more bacterial skin infections than other people  Most skin conditions can be prevented with good blood sugar control  Skin sores can be hard to heal, or become life or limb-threatening, if not treated early  DISCHARGE INSTRUCTIONS:   Contact your healthcare provider if:   · You get a severe burn or cut  · You have a sore that is painful, warm to the touch, or has redness around it  · Your sore does not get better or seems to get worse  · You have a fever  · Your blood sugar levels continue to be higher than they should be  Prevent skin sores:   · Keep your blood sugars within target range  Your healthcare provider will tell you what your blood sugar levels should be  High blood sugar levels increase your risk for skin infections and poor wound healing  · Keep your skin clean  Do not take hot baths or showers  They can cause your skin to get dry  Do not take a bubble bath if you have dry skin  Use moisturizing soaps and lotion after baths or showers  Do not put lotion between your toes  · Keep your skin from becoming too dry,  especially in cold, dry weather  When you scratch dry, itchy skin, you can cause your skin to be open to infection  Bathe less during cold weather and use lotion to moisturize  Use a humidifier to keep air in your home from being dry  · Keep areas where skin touches skin dry  Use talcum powder in areas such as armpits and groin  You may also need it under your breasts, and between your toes  Moisture in these areas can cause a fungal infection  · Treat cuts immediately  Clean minor cuts with soap and water  Cover them with sterile gauze  © 2017 [employer] Information is for End User's use only and may not be sold, redistributed or otherwise used for commercial purposes  All illustrations and images included in CareNotes® are the copyrighted property of A D A M , Inc  or Maco Hernández  The above information is an  only  It is not intended as medical advice for individual conditions or treatments  Talk to your doctor, nurse or pharmacist before following any medical regimen to see if it is safe and effective for you

## 2018-07-23 NOTE — PROGRESS NOTES
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, with long-term current use of insulin ([address] )       -   Increase basaglar insulin to 45 units daily       -   Continue to exercise daily when possible       -   F/u with endocrinologist as directed       -   Report any new s/s            Subjective: presence to the clinic for f/u Diabetes     Patient ID: Kam Mohamud is a 59 y o  female  HPI  She was last seen visit, 3 weeks ago, for TCM  At the time her serum glucose was very elevated and she was advised to increase basaglar to 43 units daily, up from 40 units daily  She continues to take short acting insulin, Humalog, 16 units before meals  She reports that her serum glucose has gone below 200 mg/dL for the first time in several months  On 2 occasions it was below 150 mg/dL  She is very excited about this  For the most part is between 200 and 250 g/dL  She is advised to increase insulin to 45 units daily  She will f/u with endocrinologist for her next apt, and RTC as needed, if frequent apts with endo clinic is not possible  More counseling on her diet done  The following portions of the patient's history were reviewed and updated as appropriate: allergies, current medications, past family history, past medical history, past social history, past surgical history and problem list     Review of Systems   Constitutional: Negative for appetite change, diaphoresis, fatigue and unexpected weight change  Respiratory: Positive for shortness of breath (with sustained physical effort, chronic)  Negative for cough, chest tightness and wheezing  Cardiovascular: Negative for chest pain and palpitations  Gastrointestinal: Negative for blood in stool, constipation, diarrhea, nausea and vomiting  Endocrine: Negative for cold intolerance, polydipsia, polyphagia and polyuria     Genitourinary: Negative for dysuria, frequency, hematuria and urgency  Objective:      /84 (BP Location: Left arm, Patient Position: Sitting, Cuff Size: Adult)   Pulse 80   Temp 97 6 °F (36 4 °C) (Oral)   Ht 5' 8 5" (1 74 m)   Wt (!) 139 kg (305 lb 8 9 oz)   BMI 45 78 kg/m²        Physical Exam   Constitutional: She appears well-developed and well-nourished  No distress  BMI is 45 78 kg/m2   Cardiovascular: Normal rate, regular rhythm and normal heart sounds  No murmur heard  Pulmonary/Chest: Effort normal and breath sounds normal  No respiratory distress  She has no wheezes  Abdominal: Soft  Bowel sounds are normal  She exhibits no distension  There is no tenderness  Musculoskeletal:   Walks with a cane   Skin: She is not diaphoretic

## 2018-08-04 ENCOUNTER — APPOINTMENT (INPATIENT)
Dept: CT IMAGING | Facility: HOSPITAL | Age: 58
DRG: 202 | End: 2018-08-04
Payer: COMMERCIAL

## 2018-08-04 ENCOUNTER — HOSPITAL ENCOUNTER (INPATIENT)
Facility: HOSPITAL | Age: 58
LOS: 2 days | Discharge: HOME | DRG: 202 | End: 2018-08-06
Attending: EMERGENCY MEDICINE | Admitting: FAMILY MEDICINE
Payer: COMMERCIAL

## 2018-08-04 ENCOUNTER — APPOINTMENT (EMERGENCY)
Dept: RADIOLOGY | Facility: HOSPITAL | Age: 58
DRG: 202 | End: 2018-08-04
Payer: COMMERCIAL

## 2018-08-04 DIAGNOSIS — R06.00 DYSPNEA ON EXERTION: Primary | ICD-10-CM

## 2018-08-04 DIAGNOSIS — IMO0002 UNCONTROLLED TYPE 2 DIABETES MELLITUS WITH OTHER OPHTHALMIC COMPLICATION, WITH LONG-TERM CURRENT USE OF INSULIN: ICD-10-CM

## 2018-08-04 DIAGNOSIS — J45.21 MILD INTERMITTENT ASTHMA WITH EXACERBATION: ICD-10-CM

## 2018-08-04 PROBLEM — R07.1 CHEST PAIN ON BREATHING: Status: ACTIVE | Noted: 2018-08-04

## 2018-08-04 LAB
ANION GAP SERPL CALCULATED.3IONS-SCNC: 7 MMOL/L (ref 5–14)
ANISOCYTOSIS BLD QL SMEAR: PRESENT
ARTERIAL PATENCY WRIST A: YES
BASE EXCESS BLDA CALC-SCNC: 1.6 MMOL/L (ref -2.1–2.1)
BASOPHILS # BLD AUTO: 0.1 THOUSANDS/ΜL (ref 0–0.1)
BASOPHILS NFR BLD AUTO: 1 % (ref 0–1)
BUN SERPL-MCNC: 29 MG/DL (ref 5–25)
CALCIUM SERPL-MCNC: 9.4 MG/DL (ref 8.4–10.2)
CHLORIDE SERPL-SCNC: 105 MMOL/L (ref 97–108)
CO2 SERPL-SCNC: 31 MMOL/L (ref 22–30)
CREAT SERPL-MCNC: 0.84 MG/DL (ref 0.6–1.2)
D-DIMER: 0.53 MG/L
EOSINOPHIL # BLD AUTO: 0.6 THOUSAND/ΜL (ref 0–0.4)
EOSINOPHIL NFR BLD AUTO: 7 % (ref 0–6)
ERYTHROCYTE [DISTWIDTH] IN BLOOD BY AUTOMATED COUNT: 15.3 %
GFR SERPL CREATININE-BSD FRML MDRD: 88 ML/MIN/1.73SQ M
GLUCOSE SERPL-MCNC: 102 MG/DL (ref 70–99)
GLUCOSE SERPL-MCNC: 112 MG/DL (ref 70–99)
GLUCOSE SERPL-MCNC: 348 MG/DL (ref 70–99)
HCO3 BLDA-SCNC: 28.6 MMOL/L (ref 22–26)
HCT VFR BLD AUTO: 34.5 % (ref 36–46)
HGB BLD-MCNC: 11.2 G/DL (ref 12–16)
HYPERCHROMIA BLD QL SMEAR: PRESENT
LYMPHOCYTES # BLD AUTO: 2 THOUSANDS/ΜL (ref 0.5–4)
LYMPHOCYTES NFR BLD AUTO: 23 % (ref 20–50)
MCH RBC QN AUTO: 25.7 PG (ref 26–34)
MCHC RBC AUTO-ENTMCNC: 32.6 G/DL (ref 31–36)
MCV RBC AUTO: 79 FL (ref 80–100)
MONOCYTES # BLD AUTO: 0.5 THOUSAND/ΜL (ref 0.2–0.9)
MONOCYTES NFR BLD AUTO: 6 % (ref 1–10)
NASAL CANNULA: ABNORMAL
NEUTROPHILS # BLD AUTO: 5.6 THOUSANDS/ΜL (ref 1.8–7.8)
NEUTS SEG NFR BLD AUTO: 64 % (ref 45–65)
NON VENT ROOM AIR: ABNORMAL %
NT-PROBNP SERPL-MCNC: 65.1 PG/ML (ref 0–299)
O2 CT BLDA-SCNC: 15 ML/DL (ref 16–23)
OXYHGB MFR BLDA: 69.9 % (ref 95–98)
PCO2 BLDA: 53 MM HG (ref 35–45)
PH BLDA: 7.34 [PH] (ref 7.35–7.45)
PLATELET # BLD AUTO: 285 THOUSANDS/UL (ref 150–450)
PLATELET # BLD AUTO: 300 THOUSANDS/UL (ref 150–450)
PLATELET BLD QL SMEAR: ADEQUATE
PMV BLD AUTO: 8.7 FL (ref 8.9–12.7)
PO2 BLDA: 36 MM HG (ref 80–105)
POTASSIUM SERPL-SCNC: 4.1 MMOL/L (ref 3.6–5)
RBC # BLD AUTO: 4.37 MILLION/UL (ref 4–5.2)
RBC MORPH BLD: NORMAL
SODIUM SERPL-SCNC: 143 MMOL/L (ref 137–147)
TROPONIN I SERPL-MCNC: <0.01 NG/ML (ref 0–0.03)
WBC # BLD AUTO: 8.7 THOUSAND/UL (ref 4.5–11)

## 2018-08-04 PROCEDURE — 80048 BASIC METABOLIC PNL TOTAL CA: CPT | Performed by: EMERGENCY MEDICINE

## 2018-08-04 PROCEDURE — 82805 BLOOD GASES W/O2 SATURATION: CPT | Performed by: FAMILY MEDICINE

## 2018-08-04 PROCEDURE — 99285 EMERGENCY DEPT VISIT HI MDM: CPT

## 2018-08-04 PROCEDURE — 71045 X-RAY EXAM CHEST 1 VIEW: CPT

## 2018-08-04 PROCEDURE — 94640 AIRWAY INHALATION TREATMENT: CPT

## 2018-08-04 PROCEDURE — 85025 COMPLETE CBC W/AUTO DIFF WBC: CPT | Performed by: EMERGENCY MEDICINE

## 2018-08-04 PROCEDURE — 82948 REAGENT STRIP/BLOOD GLUCOSE: CPT

## 2018-08-04 PROCEDURE — 71275 CT ANGIOGRAPHY CHEST: CPT

## 2018-08-04 PROCEDURE — 99223 1ST HOSP IP/OBS HIGH 75: CPT | Performed by: FAMILY MEDICINE

## 2018-08-04 PROCEDURE — 94760 N-INVAS EAR/PLS OXIMETRY 1: CPT

## 2018-08-04 PROCEDURE — 83880 ASSAY OF NATRIURETIC PEPTIDE: CPT | Performed by: EMERGENCY MEDICINE

## 2018-08-04 PROCEDURE — 84484 ASSAY OF TROPONIN QUANT: CPT | Performed by: FAMILY MEDICINE

## 2018-08-04 PROCEDURE — 94664 DEMO&/EVAL PT USE INHALER: CPT

## 2018-08-04 PROCEDURE — 85379 FIBRIN DEGRADATION QUANT: CPT | Performed by: EMERGENCY MEDICINE

## 2018-08-04 PROCEDURE — 85049 AUTOMATED PLATELET COUNT: CPT | Performed by: FAMILY MEDICINE

## 2018-08-04 PROCEDURE — 84484 ASSAY OF TROPONIN QUANT: CPT | Performed by: EMERGENCY MEDICINE

## 2018-08-04 PROCEDURE — 93005 ELECTROCARDIOGRAM TRACING: CPT

## 2018-08-04 PROCEDURE — 36415 COLL VENOUS BLD VENIPUNCTURE: CPT | Performed by: EMERGENCY MEDICINE

## 2018-08-04 RX ORDER — IPRATROPIUM BROMIDE AND ALBUTEROL SULFATE 2.5; .5 MG/3ML; MG/3ML
3 SOLUTION RESPIRATORY (INHALATION)
Status: DISCONTINUED | OUTPATIENT
Start: 2018-08-04 | End: 2018-08-04

## 2018-08-04 RX ORDER — BUDESONIDE AND FORMOTEROL FUMARATE DIHYDRATE 80; 4.5 UG/1; UG/1
2 AEROSOL RESPIRATORY (INHALATION) 2 TIMES DAILY
Status: DISCONTINUED | OUTPATIENT
Start: 2018-08-04 | End: 2018-08-04

## 2018-08-04 RX ORDER — ATORVASTATIN CALCIUM 40 MG/1
40 TABLET, FILM COATED ORAL DAILY
Status: DISCONTINUED | OUTPATIENT
Start: 2018-08-04 | End: 2018-08-06 | Stop reason: HOSPADM

## 2018-08-04 RX ORDER — MAGNESIUM SULFATE HEPTAHYDRATE 40 MG/ML
2 INJECTION, SOLUTION INTRAVENOUS ONCE
Status: COMPLETED | OUTPATIENT
Start: 2018-08-04 | End: 2018-08-04

## 2018-08-04 RX ORDER — LABETALOL 100 MG/1
200 TABLET, FILM COATED ORAL 2 TIMES DAILY
Status: DISCONTINUED | OUTPATIENT
Start: 2018-08-04 | End: 2018-08-06 | Stop reason: HOSPADM

## 2018-08-04 RX ORDER — ASPIRIN 81 MG/1
81 TABLET ORAL DAILY
Status: DISCONTINUED | OUTPATIENT
Start: 2018-08-04 | End: 2018-08-06 | Stop reason: HOSPADM

## 2018-08-04 RX ORDER — SPIRONOLACTONE 25 MG/1
25 TABLET ORAL DAILY
Status: DISCONTINUED | OUTPATIENT
Start: 2018-08-04 | End: 2018-08-06 | Stop reason: HOSPADM

## 2018-08-04 RX ORDER — AMLODIPINE BESYLATE 10 MG/1
10 TABLET ORAL DAILY
Status: DISCONTINUED | OUTPATIENT
Start: 2018-08-04 | End: 2018-08-06 | Stop reason: HOSPADM

## 2018-08-04 RX ORDER — IPRATROPIUM BROMIDE AND ALBUTEROL SULFATE 2.5; .5 MG/3ML; MG/3ML
SOLUTION RESPIRATORY (INHALATION)
Status: DISPENSED
Start: 2018-08-04 | End: 2018-08-04

## 2018-08-04 RX ORDER — METHYLPREDNISOLONE SODIUM SUCCINATE 40 MG/ML
40 INJECTION, POWDER, LYOPHILIZED, FOR SOLUTION INTRAMUSCULAR; INTRAVENOUS EVERY 8 HOURS SCHEDULED
Status: DISCONTINUED | OUTPATIENT
Start: 2018-08-04 | End: 2018-08-06

## 2018-08-04 RX ORDER — PANTOPRAZOLE SODIUM 40 MG/1
40 TABLET, DELAYED RELEASE ORAL
Status: DISCONTINUED | OUTPATIENT
Start: 2018-08-05 | End: 2018-08-06 | Stop reason: HOSPADM

## 2018-08-04 RX ORDER — GABAPENTIN 100 MG/1
100 CAPSULE ORAL 2 TIMES DAILY
Status: DISCONTINUED | OUTPATIENT
Start: 2018-08-04 | End: 2018-08-06 | Stop reason: HOSPADM

## 2018-08-04 RX ORDER — BUDESONIDE AND FORMOTEROL FUMARATE DIHYDRATE 80; 4.5 UG/1; UG/1
2 AEROSOL RESPIRATORY (INHALATION) 2 TIMES DAILY
Status: DISCONTINUED | OUTPATIENT
Start: 2018-08-04 | End: 2018-08-06 | Stop reason: HOSPADM

## 2018-08-04 RX ORDER — IPRATROPIUM BROMIDE AND ALBUTEROL SULFATE 2.5; .5 MG/3ML; MG/3ML
3 SOLUTION RESPIRATORY (INHALATION) EVERY 2 HOUR PRN
Status: DISCONTINUED | OUTPATIENT
Start: 2018-08-04 | End: 2018-08-05

## 2018-08-04 RX ORDER — INSULIN GLARGINE 100 [IU]/ML
43 INJECTION, SOLUTION SUBCUTANEOUS
Status: DISCONTINUED | OUTPATIENT
Start: 2018-08-04 | End: 2018-08-05

## 2018-08-04 RX ORDER — IPRATROPIUM BROMIDE AND ALBUTEROL SULFATE 2.5; .5 MG/3ML; MG/3ML
3 SOLUTION RESPIRATORY (INHALATION)
Status: DISCONTINUED | OUTPATIENT
Start: 2018-08-04 | End: 2018-08-05

## 2018-08-04 RX ADMIN — ALBUTEROL SULFATE 5 MG: 2.5 SOLUTION RESPIRATORY (INHALATION) at 12:37

## 2018-08-04 RX ADMIN — ATORVASTATIN CALCIUM 40 MG: 40 TABLET, FILM COATED ORAL at 15:08

## 2018-08-04 RX ADMIN — ALBUTEROL SULFATE 10 MG: 2.5 SOLUTION RESPIRATORY (INHALATION) at 17:53

## 2018-08-04 RX ADMIN — ASPIRIN 81 MG: 81 TABLET, COATED ORAL at 15:07

## 2018-08-04 RX ADMIN — AMLODIPINE BESYLATE 10 MG: 10 TABLET ORAL at 15:07

## 2018-08-04 RX ADMIN — MAGNESIUM SULFATE IN WATER 2 G: 40 INJECTION, SOLUTION INTRAVENOUS at 15:07

## 2018-08-04 RX ADMIN — GABAPENTIN 100 MG: 100 CAPSULE ORAL at 18:47

## 2018-08-04 RX ADMIN — INSULIN GLARGINE 43 UNITS: 100 INJECTION, SOLUTION SUBCUTANEOUS at 22:30

## 2018-08-04 RX ADMIN — HYDROCHLOROTHIAZIDE: 25 TABLET ORAL at 18:47

## 2018-08-04 RX ADMIN — IPRATROPIUM BROMIDE 0.5 MG: 0.5 SOLUTION RESPIRATORY (INHALATION) at 11:53

## 2018-08-04 RX ADMIN — IPRATROPIUM BROMIDE AND ALBUTEROL SULFATE 3 ML: .5; 3 SOLUTION RESPIRATORY (INHALATION) at 20:53

## 2018-08-04 RX ADMIN — Medication 0.5 MG: at 11:53

## 2018-08-04 RX ADMIN — ALBUTEROL SULFATE 5 MG: 2.5 SOLUTION RESPIRATORY (INHALATION) at 11:53

## 2018-08-04 RX ADMIN — METHYLPREDNISOLONE SODIUM SUCCINATE 40 MG: 40 INJECTION, POWDER, FOR SOLUTION INTRAMUSCULAR; INTRAVENOUS at 22:31

## 2018-08-04 RX ADMIN — METHYLPREDNISOLONE SODIUM SUCCINATE 40 MG: 40 INJECTION, POWDER, FOR SOLUTION INTRAMUSCULAR; INTRAVENOUS at 15:08

## 2018-08-04 RX ADMIN — IOHEXOL 100 ML: 350 INJECTION, SOLUTION INTRAVENOUS at 19:11

## 2018-08-04 RX ADMIN — SPIRONOLACTONE 25 MG: 25 TABLET, FILM COATED ORAL at 15:07

## 2018-08-04 RX ADMIN — LABETALOL HYDROCHLORIDE 200 MG: 100 TABLET, FILM COATED ORAL at 18:46

## 2018-08-04 RX ADMIN — Medication 5 MG: at 11:53

## 2018-08-04 RX ADMIN — BUDESONIDE AND FORMOTEROL FUMARATE DIHYDRATE 2 PUFF: 80; 4.5 AEROSOL RESPIRATORY (INHALATION) at 20:53

## 2018-08-04 RX ADMIN — IPRATROPIUM BROMIDE AND ALBUTEROL SULFATE 3 ML: .5; 3 SOLUTION RESPIRATORY (INHALATION) at 14:56

## 2018-08-04 RX ADMIN — ENOXAPARIN SODIUM 40 MG: 40 INJECTION SUBCUTANEOUS at 18:46

## 2018-08-04 NOTE — RESPIRATORY THERAPY NOTE
RT Protocol Note  Lissette Ruiz 59 y o  female MRN: [id number]  Unit/Bed#: 7T [facility] 710-01 Encounter: [id number]    Assessment    Principal Problem:    Exertional dyspnea  Active Problems:    Mild intermittent asthma with exacerbation    Chest pain on breathing      Home Pulmonary Medications:    Home Devices/Therapy: Other (Comment) (pt uses prn ventolin hfa)    Past Medical History:   Diagnosis Date    Anemia     Arthritis     Diabetes mellitus ([facility] )     Dyspnea on exertion     Hyperlipidemia     Hypertension     Legally blind 2010    Mild intermittent asthma with exacerbation 8/4/2018    Miscarriage     x 3    Seizures (HCC)     Sickle cell trait ([facility] )      Social History     Social History    Marital status: /Civil Union     Spouse name: N/A    Number of children: N/A    Years of education: N/A     Occupational History    employed      Social History Main Topics    Smoking status: Never Smoker    Smokeless tobacco: Never Used    Alcohol use No    Drug use: No    Sexual activity: No     Other Topics Concern    None     Social History Narrative    Caffeine use           Subjective    Subjective Data: PT stated she gets SOB while moving and has asthma    Objective    Physical Exam:   Assessment Type: During-treatment  General Appearance: Awake, Alert  Respiratory Pattern: Dyspnea with exertion  Chest Assessment: Chest expansion symmetrical  Bilateral Breath Sounds: Expiratory wheezes, Diminished  Cough: Dry    Vitals:  Blood pressure (!) 176/86, pulse 79, temperature (!) 97 4 °F (36 3 °C), temperature source Temporal, resp  rate (!) 26, height 5' 8 5" (1 74 m), weight (!) 139 kg (305 lb 12 5 oz), SpO2 96 %, not currently breastfeeding  Imaging and other studies: I have personally reviewed pertinent reports              Plan    Respiratory Plan: Mild Distress pathway         Resp Comments: Pt needs LABA drug, pt via respiratory protcol to q6h

## 2018-08-04 NOTE — ASSESSMENT & PLAN NOTE
Lab Results   Component Value Date    HGBA1C 11 1 (H) 06/25/2018       No results for input(s): POCGLU in the last 72 hours      Blood Sugar Average: Last 72 hrs:  ·  I will continue her home Lantus 43 units at night I will hold off on scheduled short-acting insulins with meals for now will place on sliding scale adjust as needed

## 2018-08-04 NOTE — ED PROCEDURE NOTE
PROCEDURE  ECG 12 Lead Documentation  Date/Time: 8/4/2018 11:47 AM  Performed by: Elham Villanueva  Authorized by: Elham Villanueva     Indications / Diagnosis:  Sob  ECG reviewed by me, the ED Provider: yes    Patient location:  ED  Previous ECG:     Comparison to cardiac monitor: Yes    Interpretation:     Interpretation: normal    Quality:     Tracing quality:  Limited by artifact  Rate:     ECG rate:  78    ECG rate assessment: normal    Rhythm:     Rhythm: sinus rhythm    QRS:     QRS axis:  Normal    QRS intervals:  Normal  Conduction:     Conduction: normal    ST segments:     ST segments:  Normal  T waves:     T waves: normal           Nelli Rodriguez MD  08/04/18 1157

## 2018-08-04 NOTE — ASSESSMENT & PLAN NOTE
· Elevated and she has a respiratory issue-restart her home medications also monitor for orthostatic hypotension as she does have previously she also stated when she gets up at times she does get lightheaded about to pass out suspect this could be secondary to hypoxia may be as well as also associated when she is short of breath on exertion but also evaluate for orthostatic hypotension    She was hospitalized and evaluated for your rate previously is not a new problem as stated echo was done normal EF no aortic stenosis

## 2018-08-04 NOTE — ASSESSMENT & PLAN NOTE
· She is experiencing chest tightness associated with asthma exacerbation bronchospasm less likely ACS she had a previous echo done which was essentially normal except is the severe concentric hypertrophy  EKG has no acute ischemic changes troponins x1 was negative will repeat another 2 repeat EKG in the morning apparently listed allergy to aspirin in the previous notes  · Chest x-ray just reveals cardiomegaly  · D-dimer is present if positive we will proceed with CT IV dye

## 2018-08-04 NOTE — H&P
H&P- Amandeep Kauffman 1960, 59 y o  female MRN: [id number]    Unit/Bed#: 7T [facility] 710-01 Encounter: [id number]    Primary Care Provider: Radha Leigh MD   Date and time admitted to hospital: 8/4/2018 11:31 AM        * Exertional dyspnea   Assessment & Plan    · Patient comes in with exertional the dyspnea actually started yesterday  She is having wheezing start or inspiratory apparently alleviated with her pumps at home yesterday  · Patient has been having ongoing exertional dyspnea issues since February 2018 she has been evaluated by pulmonology at [facility] Dr Avinash pillai and also her own cardiologist as well  · Although patient denies I reviewed in the records and pulmonology that it stated that she was diagnosed with history of asthma in the past  · She also has mild sleep apnea but she has not followed up with the sleep doctor to discuss the CPAP options she has not followed up since last pulmonology appointment to have PFTs done at either  · She was recently hospitalized at the [facility] that in June 2018 had an echo done with normal systolic and diastolic function it showed severe concentric hypertrophy  · Right now she sounds like an acute asthma exacerbation  She is not hypoxic her oxygen saturation 96% the low she is using accessory muscles I will provide her Solu-Medrol 40 mg IV q 8 hours will give her magnesium sulfate 2 g over 20 minutes and also DuoNeb treatments q 4 hours and q 2 hours p r n  I am awaiting a D-dimer evaluation  Her chest x-ray has no pulmonary edema infiltrate  She denies any paroxysmal nocturnal dyspnea she states she sleeps a little bit elevated due to breathing issues  · Troponin x1 was negative she had a lactic scan done on previous hospitalization which was positive and after which follow-up follow up with her cardiologist Dr Powers Crew he thought most likely was false-positive but as I stated her echo was normal EF done in June 2018    I will repeat another EKG in the morning I did an EKG now no acute ischemia noticed, troponins x3   · If the D-dimer is positive we will proceed with a CT IV dye  · Oxygen to keep oxygen saturation greater than 92%  · Will also obtain an ABG to see her CO2 level  Mild intermittent asthma with exacerbation   Assessment & Plan    · At I reviewed of pulmonology is note patient was evaluated by Dr Becky Sarmiento may  in March 2018 patient denies although it is stated that she was diagnosed with asthma in the past and she does have seasonal allergies apparently she went out yesterday to walk over to the [facility] and she developed the wheezing and shortness of breath which was alleviated by her pump at home  · She does have expiratory expiratory wheezing  She is using accessory muscles I will continue her DuoNeb treatments q 4 q 2 hours out ask Pulmonary to evaluate her again patient never actually followed up and have the pulmonary functions done  · Will give her Solu-Medrol 40 mg IV q 8 hours will give her a dose of magnesium sulfate 2 g IV over 20 minutes  · Oxygen  · Will get an ABG to evaluate his CO2 status as well  · She is not hypoxic her night she started 96% on room air  · Chest x-ray is normal without any infiltration or pulmonary edema there is cardiomegaly  Chest pain on breathing   Assessment & Plan    · She is experiencing chest tightness associated with asthma exacerbation bronchospasm less likely ACS she had a previous echo done which was essentially normal except is the severe concentric hypertrophy  EKG has no acute ischemic changes troponins x1 was negative will repeat another 2 repeat EKG in the morning apparently listed allergy to aspirin in the previous notes  · Chest x-ray just reveals cardiomegaly  · D-dimer is present if positive we will proceed with CT IV dye          Lung nodules   Assessment & Plan    · Lung nodules less than 4 mm evaluated by pulmonology reviewed note low risk no need to have any further workup/follow-up  Prolonged QT interval   Assessment & Plan    · QTC is 478, lower than last time of 492, monitor magnesium potassium, medication, repeat EKG in the morning        Peripheral neuropathy   Assessment & Plan    · Continue gabapentin        Obesity   Assessment & Plan    · Will place her on low carb low calorie diet        Mild obstructive sleep apnea   Assessment & Plan    · Supposed to have outpatient evaluation for CPAP         Dyslipidemia   Assessment & Plan    · Continue statin        Seizures ([facility] )   Assessment & Plan    · She had when she was a child she has been on medications or had any seizures since then        Hypertension   Assessment & Plan    · Elevated and she has a respiratory issue-restart her home medications also monitor for orthostatic hypotension as she does have previously she also stated when she gets up at times she does get lightheaded about to pass out suspect this could be secondary to hypoxia may be as well as also associated when she is short of breath on exertion but also evaluate for orthostatic hypotension  She was hospitalized and evaluated for your rate previously is not a new problem as stated echo was done normal EF no aortic stenosis        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, with long-term current use of insulin ([facility] )   Assessment & Plan    Lab Results   Component Value Date    HGBA1C 11 1 (H) 06/25/2018       No results for input(s): POCGLU in the last 72 hours      Blood Sugar Average: Last 72 hrs:  ·  I will continue her home Lantus 43 units at night I will hold off on scheduled short-acting insulins with meals for now will place on sliding scale adjust as needed                        VTE Prophylaxis: Enoxaparin (Lovenox)  / sequential compression device   Code Status: full code  POLST: There is no POLST form on file for this patient (pre-hospital)  Discussion with family: patient    Anticipated Length of Stay:  Patient will be admitted on an Inpatient basis with an anticipated length of stay of  > 2 midnights  Justification for Hospital Stay:  He had exertional dyspnea and asthma exacerbation    Total Time for Visit, including Counseling / Coordination of Care: 45 minutes  Greater than 50% of this total time spent on direct patient counseling and coordination of care  Chief Complaint:  Shortness of breath on exertion    History of Present Illness:    Joyce Mcdaniel is a 59 y o  female who presents with shortness of breath while walking  Patient not yesterday it to pay her rent and became short of breath had to sit down and came back home to take her inhaler which alleviated her symptoms  She also has seasonal allergies  It today her shortness of breath was uncontrolled when she came in in the ER I was told that she almost passed out she also said when she is ambulating she does get associated dizziness  Also has associated chest tightness with shortness of breath  Denies any wet cough just dry cough  She has been evaluate for this exertional dyspnea in the past with wheezing as well by pulmonology and Cardiology  She has been gaining weight she sleeps level elevated secondary to breathing problem but she does not have any paroxysmal nocturnal dyspnea  Her leg edema hasnt worsened  Other patient was diagnosed with asthma in the past a long time ago  She also had previous cardiac workup, she was also previously admitted with a dizziness when she ambulates she has a history of orthostatic hypotension   Review of Systems:    Review of Systems   Constitutional: Negative for fatigue and fever  HENT: Negative  Negative for ear pain, rhinorrhea and sore throat  Eyes: Negative  Negative for pain and itching  Respiratory: Positive for cough (Dry), chest tightness, shortness of breath and wheezing  Cardiovascular: Positive for leg swelling (Mild have not worsened)   Negative for chest pain and palpitations  Gastrointestinal: Negative  Negative for abdominal pain, diarrhea, nausea and vomiting  Endocrine: Negative for polydipsia, polyphagia and polyuria  Genitourinary: Negative for dysuria and flank pain  Musculoskeletal: Negative  Negative for back pain and myalgias  Skin: Negative  Negative for rash and wound  Neurological: Positive for dizziness  Negative for syncope, speech difficulty, weakness, light-headedness, numbness and headaches  Psychiatric/Behavioral: Negative for agitation, confusion and decreased concentration  All other systems reviewed and are negative  Past Medical and Surgical History:     Past Medical History:   Diagnosis Date    Anemia     Arthritis     Diabetes mellitus ([facility] )     Dyspnea on exertion     Hyperlipidemia     Hypertension     Legally blind 2010    Mild intermittent asthma with exacerbation 8/4/2018    Miscarriage     x 3    Seizures (HCC)     Sickle cell trait ([facility] )        Past Surgical History:   Procedure Laterality Date    CATARACT EXTRACTION Bilateral     august and september    EYE SURGERY      HYSTERECTOMY         Meds/Allergies:    Prior to Admission medications    Medication Sig Start Date End Date Taking?  Authorizing Provider   amLODIPine (NORVASC) 10 mg tablet Take 1 tablet by mouth daily 12/10/15  Yes Historical Provider, MD   aspirin 81 MG tablet Take 1 tablet by mouth daily 12/11/17  Yes Historical Provider, MD   atorvastatin (LIPITOR) 40 mg tablet Take 1 tablet (40 mg total) by mouth daily 7/2/18  Yes CONCETTA Loyola   Cholecalciferol (VITAMIN D3) 2000 units capsule Take by mouth   Yes Historical Provider, MD   gabapentin (NEURONTIN) 100 mg capsule Take 1 capsule (100 mg total) by mouth 2 (two) times a day 7/2/18  Yes CONCETTA Loyola   insulin glargine (BASAGLAR KWIKPEN) 100 units/mL injection pen Inject 43 Units under the skin daily 7/2/18  Yes CONCETTA Bejarano   insulin lispro (HUMALOG KWIKPEN) 100 Units/mL SOPN Inject 10-18 Units under the skin 3 (three) times a day with meals 14 before breakfast 10 before lunch 18 before dinner plus sliding scale 3/30/18  Yes Eden Jacinto PA-C   labetalol (NORMODYNE) 200 mg tablet Take 200 mg by mouth 2 (two) times a day   Yes Historical Provider, MD   metFORMIN (GLUCOPHAGE) 500 mg tablet TAKE 2 TABLETS BY MOUTH WITH DINNER 7/10/18  Yes Shireen Naqvi MD   spironolactone (ALDACTONE) 25 mg tablet TAKE 1 TABLET BY MOUTH EVERY DAY 6/12/18  Yes Eden Jacinto PA-C   SARAH MICROLET LANCETS lancets Inject 1 applicator into the skin 4 (four) times a day 7/27/11   Historical Provider, MD   Blood Pressure Monitor KIT Check blood pressures BID 6/26/18   Candelaria Guerrero,    CONTOUR NEXT TEST test strip 4 (four) times a day Test blood sugar 7/3/18   Historical Provider, MD   Insulin Pen Needle (BD PEN NEEDLE DERRICK U/F) 32G X 4 MM MISC Inject 1 applicator under the skin 4 (four) times a day 11/2/16   Historical Provider, MD   valsartan-hydrochlorothiazide (DIOVAN-HCT) 160-12 5 MG per tablet Take 1 tablet by mouth daily 6/26/18   Candelaria Guerrero,      I have reviewed home medications using allscripts  Allergies:    Allergies   Allergen Reactions    2,4-D Dimethylamine (Amisol)      Patient unsure why this is here        Social History:     Marital Status: /Civil Union   Substance Use History:   History   Alcohol Use No     History   Smoking Status    Never Smoker   Smokeless Tobacco    Never Used     History   Drug Use No       Family History:    Family History   Problem Relation Age of Onset    Heart attack Mother     Heart disease Mother     Hypertension Mother     No Known Problems Father     Heart disease Sister     No Known Problems Brother     No Known Problems Son     No Known Problems Daughter     Heart attack Maternal Grandmother     Diabetes Other     Heart attack Other        Physical Exam:     Vitals:   Blood Pressure: (!) 176/86 (08/04/18 1400)  Pulse: 79 (08/04/18 1400)  Temperature: (!) 97 4 °F (36 3 °C) (08/04/18 1400)  Temp Source: Temporal (08/04/18 1400)  Respirations: (!) 26 (08/04/18 1400)  Height: 5' 8 5" (174 cm) (08/04/18 1400)  Weight - Scale: (!) 139 kg (305 lb 12 5 oz) (08/04/18 1400)  SpO2: 96 % (08/04/18 1400)    Physical Exam   Constitutional: She is oriented to person, place, and time  She appears well-developed and well-nourished  HENT:   Head: Normocephalic and atraumatic  Eyes: EOM are normal  Pupils are equal, round, and reactive to light  Neck: Normal range of motion  Neck supple  No JVD present  Cardiovascular: Normal rate, regular rhythm and normal heart sounds  Pulmonary/Chest: No stridor  She is in respiratory distress (Accessory muscle use)  She has wheezes (Bilateral expiratory with inspiratory)  Abdominal: Soft  Bowel sounds are normal    Musculoskeletal: Normal range of motion  She exhibits edema (Mild nonpitting bilateral lower extremities)  Neurological: She is alert and oriented to person, place, and time  She has normal reflexes  Skin: Skin is warm  Psychiatric: She has a normal mood and affect             Additional Data:     Lab Results: I have personally reviewed pertinent films in PACS      Results from last 7 days  Lab Units 08/04/18  1146   WBC Thousand/uL 8 70   HEMOGLOBIN g/dL 11 2*   HEMATOCRIT % 34 5*   PLATELETS Thousands/uL 285   NEUTROS PCT % 64   LYMPHS PCT % 23   MONOS PCT % 6   EOS PCT % 7*       Results from last 7 days  Lab Units 08/04/18  1146   SODIUM mmol/L 143   POTASSIUM mmol/L 4 1   CHLORIDE mmol/L 105   CO2 mmol/L 31*   BUN mg/dL 29*   CREATININE mg/dL 0 84   CALCIUM mg/dL 9 4   GLUCOSE RANDOM mg/dL 102*                   Imaging: I have personally reviewed pertinent films in PACS    XR chest portable   ED Interpretation by Raymundo Schuler MD (08/04 1204)   edema      Final Result by Bridgett Starks MD (08/04 1322)      Stable cardiomegaly            Workstation performed: [id number] EKG, Pathology, and Other Studies Reviewed on Admission:   · EKG:  Reviewed    Allscripts / Epic Records Reviewed: Yes     ** Please Note: This note has been constructed using a voice recognition system   **

## 2018-08-04 NOTE — RESPIRATORY THERAPY NOTE
ABG was mixed and not true arterial which can be seen by the results, the Dr  has been informed about it and she is okay with it

## 2018-08-04 NOTE — ASSESSMENT & PLAN NOTE
· Lung nodules less than 4 mm evaluated by pulmonology reviewed note low risk no need to have any further workup/follow-up

## 2018-08-04 NOTE — ASSESSMENT & PLAN NOTE
· QTC is 478, lower than last time of 492, monitor magnesium potassium, medication, repeat EKG in the morning

## 2018-08-04 NOTE — ED PROVIDER NOTES
History  Chief Complaint   Patient presents with    Shortness of Breath     "Since last night I have SOB  I used my pump and it helped a little but now it's not helping any more"  Reports that her chest is tight as well and that she feels lightheaded     Patient is a 70-year-old female with a history of high blood pressure diabetes who presents with a 2 day history of dyspnea on exertion  Patient states started last night while she was trying to leave the house correct appearance  Shortness of breath has continued since worse with exertion no real improvement with rest nor with her albuterol inhaler  Patient denies history of asthma nor CHF  Does complain of some generalized chest tightness  No cough and no fevers sweats or chills  Had similar episodes earlier this year was admitted for pneumonia after about of initial bronchitis per patient  Prior to Admission Medications   Prescriptions Last Dose Informant Patient Reported? Taking?    SARAH MICROLET LANCETS lancets  Self Yes No   Sig: Inject 1 applicator into the skin 4 (four) times a day   Blood Pressure Monitor KIT   No No   Sig: Check blood pressures BID   CONTOUR NEXT TEST test strip   Yes No   Si (four) times a day Test blood sugar   Cholecalciferol (VITAMIN D3) 2000 units capsule  Self Yes No   Sig: Take by mouth   Insulin Pen Needle (BD PEN NEEDLE DERRICK U/F) 32G X 4 MM MISC  Self Yes No   Sig: Inject 1 applicator under the skin 4 (four) times a day   amLODIPine (NORVASC) 10 mg tablet  Self Yes No   Sig: Take 1 tablet by mouth daily   aspirin 81 MG tablet  Self Yes No   Sig: Take 1 tablet by mouth daily   atorvastatin (LIPITOR) 40 mg tablet   No No   Sig: Take 1 tablet (40 mg total) by mouth daily   gabapentin (NEURONTIN) 100 mg capsule   No No   Sig: Take 1 capsule (100 mg total) by mouth 2 (two) times a day   insulin glargine (BASAGLAR KWIKPEN) 100 units/mL injection pen   No No   Sig: Inject 43 Units under the skin daily   insulin lispro (HUMALOG KWIKPEN) 100 Units/mL SOPN   No No   Sig: Inject 10-18 Units under the skin 3 (three) times a day with meals 14 before breakfast 10 before lunch 18 before dinner plus sliding scale   labetalol (NORMODYNE) 200 mg tablet  Self Yes No   Sig: Take 200 mg by mouth 2 (two) times a day   metFORMIN (GLUCOPHAGE) 500 mg tablet   No No   Sig: TAKE 2 TABLETS BY MOUTH WITH DINNER   spironolactone (ALDACTONE) 25 mg tablet   No No   Sig: TAKE 1 TABLET BY MOUTH EVERY DAY   valsartan-hydrochlorothiazide (DIOVAN-HCT) 160-12 5 MG per tablet   No No   Sig: Take 1 tablet by mouth daily      Facility-Administered Medications: None       Past Medical History:   Diagnosis Date    Anemia     Diabetes mellitus (HCC)     Dyspnea on exertion     Hyperlipidemia     Hypertension     Legally blind 2010    Miscarriage     x 3    Seizures ([address] )        Past Surgical History:   Procedure Laterality Date    CATARACT EXTRACTION Bilateral     august and september    EYE SURGERY      HYSTERECTOMY         Family History   Problem Relation Age of Onset    Heart attack Mother     Heart disease Mother     Hypertension Mother     No Known Problems Father     No Known Problems Sister     No Known Problems Brother     No Known Problems Son     No Known Problems Daughter     Heart attack Maternal Grandmother     Diabetes Other     Heart attack Other      I have reviewed and agree with the history as documented  Social History   Substance Use Topics    Smoking status: Never Smoker    Smokeless tobacco: Never Used    Alcohol use No        Review of Systems   Constitutional: Positive for activity change  HENT: Negative  Eyes: Negative  Respiratory: Positive for chest tightness and shortness of breath  Cardiovascular: Negative  Negative for chest pain and leg swelling  Gastrointestinal: Negative  Endocrine: Negative  Genitourinary: Negative  Musculoskeletal: Negative  Skin: Negative  Allergic/Immunologic: Negative  Neurological: Negative  Hematological: Negative  Psychiatric/Behavioral: Negative  All other systems reviewed and are negative  Physical Exam  Physical Exam   Constitutional: She appears well-developed and well-nourished  HENT:   Head: Normocephalic and atraumatic  Right Ear: External ear normal    Left Ear: External ear normal    Nose: Nose normal    Mouth/Throat: Oropharynx is clear and moist    Eyes: Conjunctivae are normal  Pupils are equal, round, and reactive to light  Neck: Normal range of motion  Neck supple  Cardiovascular: Normal rate, regular rhythm, normal heart sounds and intact distal pulses  Pulmonary/Chest: She is in respiratory distress  She has wheezes  She has no rales  She exhibits no tenderness  Tachypneic   inspiratory and expiratory wheezes bilateral lower fields  Abdominal: Soft  Bowel sounds are normal    Musculoskeletal: Normal range of motion  She exhibits edema  1+ pitting edema bilateral lower extremities  Neurological: She is alert  Skin: Skin is warm and dry  Capillary refill takes less than 2 seconds  Psychiatric: Her behavior is normal    Nursing note and vitals reviewed        Vital Signs  ED Triage Vitals [08/04/18 1129]   Temperature Pulse Respirations Blood Pressure SpO2   97 8 °F (36 6 °C) 82 (!) 26 160/83 96 %      Temp src Heart Rate Source Patient Position - Orthostatic VS BP Location FiO2 (%)   -- -- -- -- --      Pain Score       --           Vitals:    08/04/18 1129   BP: 160/83   Pulse: 82       Visual Acuity      ED Medications  Medications   ipratropium-albuterol (DUO-NEB) 0 5-2 5 mg/3 mL inhalation solution **AcuDose Override Pull** (  Not Given 8/4/18 1153)   albuterol inhalation solution 5 mg (not administered)   albuterol inhalation solution 5 mg (5 mg Nebulization Given 8/4/18 1153)   ipratropium (ATROVENT) 0 02 % inhalation solution 0 5 mg (0 5 mg Nebulization Given 8/4/18 1153) Diagnostic Studies  Results Reviewed     Procedure Component Value Units Date/Time    D-dimer, quantitative [[id number]]     Lab Status:  No result Specimen:  Blood     Troponin I [[id number]]  (Normal) Collected:  08/04/18 1146    Lab Status:  Final result Specimen:  Blood from Arm, Right Updated:  08/04/18 1217     Troponin I <0 01 ng/mL     BNP [[id number]]  (Normal) Collected:  08/04/18 1146    Lab Status:  Final result Specimen:  Blood from Arm, Right Updated:  08/04/18 1213     NT-proBNP 65 1 pg/mL     Basic metabolic panel [[id number]]  (Abnormal) Collected:  08/04/18 1146    Lab Status:  Final result Specimen:  Blood from Arm, Right Updated:  08/04/18 1205     Sodium 143 mmol/L      Potassium 4 1 mmol/L      Chloride 105 mmol/L      CO2 31 (H) mmol/L      Anion Gap 7 mmol/L      BUN 29 (H) mg/dL      Creatinine 0 84 mg/dL      Glucose 102 (H) mg/dL      Calcium 9 4 mg/dL      eGFR 88 ml/min/1 73sq m     Narrative:         National Kidney Disease Education Program recommendations are as follows:  GFR calculation is accurate only with a steady state creatinine  Chronic Kidney disease less than 60 ml/min/1 73 sq  meters  Kidney failure less than 15 ml/min/1 73 sq  meters      CBC and differential [[id number]]  (Abnormal) Collected:  08/04/18 1146    Lab Status:  Final result Specimen:  Blood from Arm, Right Updated:  08/04/18 1204     WBC 8 70 Thousand/uL      RBC 4 37 Million/uL      Hemoglobin 11 2 (L) g/dL      Hematocrit 34 5 (L) %      MCV 79 (L) fL      MCH 25 7 (L) pg      MCHC 32 6 g/dL      RDW 15 3 (H) %      MPV 8 7 (L) fL      Platelets 265 Thousands/uL      Neutrophils Relative 64 %      Lymphocytes Relative 23 %      Monocytes Relative 6 %      Eosinophils Relative 7 (H) %      Basophils Relative 1 %      Neutrophils Absolute 5 60 Thousands/µL      Lymphocytes Absolute 2 00 Thousands/µL      Monocytes Absolute 0 50 Thousand/µL      Eosinophils Absolute 0 60 (H) Thousand/µL      Basophils Absolute 0 10 Thousands/µL                  XR chest portable   ED Interpretation by Micha Tomas MD (08/04 1204)   edema                 Procedures  Procedures       Phone Contacts  ED Phone Contact    ED Course  ED Course as of Aug 04 1233   Sat Aug 04, 2018   1230 Spoke with Dr Merlynn Skiff on would like to check D-dimer  Willing to accept in patient on tele admission  MDM  Number of Diagnoses or Management Options  Dyspnea on exertion:   Diagnosis management comments: Patient here with symptoms mild improved after albuterol neb  Reviewed labs EKG chest x-ray  Patient still with dyspnea on exertion uncomfortable to the patient this time  Patient would benefit inpatient admission observation possible cardiology consult discussed with Dr Alex Lee willing to accept patient of full admission tele  Amount and/or Complexity of Data Reviewed  Clinical lab tests: ordered and reviewed  Tests in the radiology section of CPT®: ordered  Tests in the medicine section of CPT®: reviewed and ordered  Discussion of test results with the performing providers: yes  Decide to obtain previous medical records or to obtain history from someone other than the patient: yes  Review and summarize past medical records: yes  Discuss the patient with other providers: yes  Independent visualization of images, tracings, or specimens: yes      CritCare Time    Disposition  Final diagnoses:   Dyspnea on exertion     Time reflects when diagnosis was documented in both MDM as applicable and the Disposition within this note     Time User Action Codes Description Comment    8/4/2018 12:31 PM Dina Reasoner Add [R06 [id number]] Dyspnea on exertion       ED Disposition     ED Disposition Condition Comment    Admit  Case was discussed with Dr Cammie Levin and the patient's admission status was agreed to be Admission Status: inpatient status to the service of Dr Cammie Levin           Follow-up Information    None         Patient's Medications   Discharge Prescriptions    No medications on file     No discharge procedures on file      ED Provider  Electronically Signed by           Micha Tomas MD  08/04/18 6107

## 2018-08-04 NOTE — ASSESSMENT & PLAN NOTE
· Patient comes in with exertional the dyspnea actually started yesterday  She is having wheezing start or inspiratory apparently alleviated with her pumps at home yesterday  · Patient has been having ongoing exertional dyspnea issues since February 2018 she has been evaluated by pulmonology at [facility] Dr Paul pillai and also her own cardiologist as well  · Although patient denies I reviewed in the records and pulmonology that it stated that she was diagnosed with history of asthma in the past  · She also has mild sleep apnea but she has not followed up with the sleep doctor to discuss the CPAP options she has not followed up since last pulmonology appointment to have PFTs done at either  · She was recently hospitalized at the [facility] that in June 2018 had an echo done with normal systolic and diastolic function it showed severe concentric hypertrophy  · Right now she sounds like an acute asthma exacerbation  She is not hypoxic her oxygen saturation 96% the low she is using accessory muscles I will provide her Solu-Medrol 40 mg IV q 8 hours will give her magnesium sulfate 2 g over 20 minutes and also DuoNeb treatments q 4 hours and q 2 hours p r n  I am awaiting a D-dimer evaluation  Her chest x-ray has no pulmonary edema infiltrate  She denies any paroxysmal nocturnal dyspnea she states she sleeps a little bit elevated due to breathing issues  · Troponin x1 was negative she had a lactic scan done on previous hospitalization which was positive and after which follow-up follow up with her cardiologist Dr Da Walker he thought most likely was false-positive but as I stated her echo was normal EF done in June 2018  I will repeat another EKG in the morning I did an EKG now no acute ischemia noticed, troponins x3   · If the D-dimer is positive we will proceed with a CT IV dye  · Oxygen to keep oxygen saturation greater than 92%  · Will also obtain an ABG to see her CO2 level

## 2018-08-04 NOTE — ASSESSMENT & PLAN NOTE
· At I reviewed of pulmonology is note patient was evaluated by Dr Amanda pillai in March 2018 patient denies although it is stated that she was diagnosed with asthma in the past and she does have seasonal allergies apparently she went out yesterday to walk over to the rental place and she developed the wheezing and shortness of breath which was alleviated by her pump at home  · She does have expiratory expiratory wheezing  She is using accessory muscles I will continue her DuoNeb treatments q 4 q 2 hours out ask Pulmonary to evaluate her again patient never actually followed up and have the pulmonary functions done  · Will give her Solu-Medrol 40 mg IV q 8 hours will give her a dose of magnesium sulfate 2 g IV over 20 minutes  · Oxygen  · Will get an ABG to evaluate his CO2 status as well  · She is not hypoxic her night she started 96% on room air  · Chest x-ray is normal without any infiltration or pulmonary edema there is cardiomegaly

## 2018-08-05 PROBLEM — R07.1 CHEST PAIN ON BREATHING: Status: RESOLVED | Noted: 2018-08-04 | Resolved: 2018-08-05

## 2018-08-05 LAB
ANION GAP SERPL CALCULATED.3IONS-SCNC: 12 MMOL/L (ref 5–14)
ATRIAL RATE: 78 BPM
ATRIAL RATE: 93 BPM
BUN SERPL-MCNC: 34 MG/DL (ref 5–25)
CALCIUM SERPL-MCNC: 10 MG/DL (ref 8.4–10.2)
CHLORIDE SERPL-SCNC: 102 MMOL/L (ref 97–108)
CO2 SERPL-SCNC: 23 MMOL/L (ref 22–30)
CREAT SERPL-MCNC: 0.91 MG/DL (ref 0.6–1.2)
ERYTHROCYTE [DISTWIDTH] IN BLOOD BY AUTOMATED COUNT: 15.7 %
GFR SERPL CREATININE-BSD FRML MDRD: 80 ML/MIN/1.73SQ M
GLUCOSE SERPL-MCNC: 353 MG/DL (ref 70–99)
GLUCOSE SERPL-MCNC: 395 MG/DL (ref 70–99)
GLUCOSE SERPL-MCNC: 443 MG/DL (ref 70–99)
GLUCOSE SERPL-MCNC: 483 MG/DL (ref 70–99)
HCT VFR BLD AUTO: 35.4 % (ref 36–46)
HGB BLD-MCNC: 11.1 G/DL (ref 12–16)
MAGNESIUM SERPL-MCNC: 2.5 MG/DL (ref 1.6–2.3)
MCH RBC QN AUTO: 25.3 PG (ref 26–34)
MCHC RBC AUTO-ENTMCNC: 31.5 G/DL (ref 31–36)
MCV RBC AUTO: 80 FL (ref 80–100)
P AXIS: 54 DEGREES
P AXIS: 64 DEGREES
PLATELET # BLD AUTO: 281 THOUSANDS/UL (ref 150–450)
PMV BLD AUTO: 10.1 FL (ref 8.9–12.7)
POTASSIUM SERPL-SCNC: 4.7 MMOL/L (ref 3.6–5)
PR INTERVAL: 188 MS
PR INTERVAL: 192 MS
QRS AXIS: 14 DEGREES
QRS AXIS: 2 DEGREES
QRSD INTERVAL: 100 MS
QRSD INTERVAL: 106 MS
QT INTERVAL: 384 MS
QT INTERVAL: 420 MS
QTC INTERVAL: 477 MS
QTC INTERVAL: 478 MS
RBC # BLD AUTO: 4.41 MILLION/UL (ref 4–5.2)
SODIUM SERPL-SCNC: 137 MMOL/L (ref 137–147)
T WAVE AXIS: 37 DEGREES
T WAVE AXIS: 47 DEGREES
VENTRICULAR RATE: 78 BPM
VENTRICULAR RATE: 93 BPM
WBC # BLD AUTO: 11.8 THOUSAND/UL (ref 4.5–11)

## 2018-08-05 PROCEDURE — 94640 AIRWAY INHALATION TREATMENT: CPT

## 2018-08-05 PROCEDURE — 93010 ELECTROCARDIOGRAM REPORT: CPT | Performed by: INTERNAL MEDICINE

## 2018-08-05 PROCEDURE — 85027 COMPLETE CBC AUTOMATED: CPT | Performed by: FAMILY MEDICINE

## 2018-08-05 PROCEDURE — 80048 BASIC METABOLIC PNL TOTAL CA: CPT | Performed by: FAMILY MEDICINE

## 2018-08-05 PROCEDURE — 83735 ASSAY OF MAGNESIUM: CPT | Performed by: FAMILY MEDICINE

## 2018-08-05 PROCEDURE — 94760 N-INVAS EAR/PLS OXIMETRY 1: CPT

## 2018-08-05 PROCEDURE — 99232 SBSQ HOSP IP/OBS MODERATE 35: CPT | Performed by: FAMILY MEDICINE

## 2018-08-05 PROCEDURE — 93005 ELECTROCARDIOGRAM TRACING: CPT

## 2018-08-05 PROCEDURE — 82948 REAGENT STRIP/BLOOD GLUCOSE: CPT

## 2018-08-05 RX ORDER — LEVALBUTEROL 1.25 MG/.5ML
1.25 SOLUTION, CONCENTRATE RESPIRATORY (INHALATION)
Status: DISCONTINUED | OUTPATIENT
Start: 2018-08-06 | End: 2018-08-06 | Stop reason: HOSPADM

## 2018-08-05 RX ORDER — LEVALBUTEROL 1.25 MG/.5ML
1.25 SOLUTION, CONCENTRATE RESPIRATORY (INHALATION) EVERY 8 HOURS PRN
Status: DISCONTINUED | OUTPATIENT
Start: 2018-08-05 | End: 2018-08-05

## 2018-08-05 RX ORDER — INSULIN GLARGINE 100 [IU]/ML
48 INJECTION, SOLUTION SUBCUTANEOUS
Status: DISCONTINUED | OUTPATIENT
Start: 2018-08-05 | End: 2018-08-06 | Stop reason: HOSPADM

## 2018-08-05 RX ADMIN — HYDROCHLOROTHIAZIDE: 25 TABLET ORAL at 09:47

## 2018-08-05 RX ADMIN — BUDESONIDE AND FORMOTEROL FUMARATE DIHYDRATE 2 PUFF: 80; 4.5 AEROSOL RESPIRATORY (INHALATION) at 08:08

## 2018-08-05 RX ADMIN — IPRATROPIUM BROMIDE AND ALBUTEROL SULFATE 3 ML: .5; 3 SOLUTION RESPIRATORY (INHALATION) at 08:08

## 2018-08-05 RX ADMIN — IPRATROPIUM BROMIDE AND ALBUTEROL SULFATE 3 ML: .5; 3 SOLUTION RESPIRATORY (INHALATION) at 00:05

## 2018-08-05 RX ADMIN — ATORVASTATIN CALCIUM 40 MG: 40 TABLET, FILM COATED ORAL at 09:46

## 2018-08-05 RX ADMIN — IPRATROPIUM BROMIDE AND ALBUTEROL SULFATE 3 ML: .5; 3 SOLUTION RESPIRATORY (INHALATION) at 19:26

## 2018-08-05 RX ADMIN — SPIRONOLACTONE 25 MG: 25 TABLET, FILM COATED ORAL at 09:46

## 2018-08-05 RX ADMIN — METHYLPREDNISOLONE SODIUM SUCCINATE 40 MG: 40 INJECTION, POWDER, FOR SOLUTION INTRAMUSCULAR; INTRAVENOUS at 05:57

## 2018-08-05 RX ADMIN — METHYLPREDNISOLONE SODIUM SUCCINATE 40 MG: 40 INJECTION, POWDER, FOR SOLUTION INTRAMUSCULAR; INTRAVENOUS at 21:47

## 2018-08-05 RX ADMIN — IPRATROPIUM BROMIDE AND ALBUTEROL SULFATE 3 ML: .5; 3 SOLUTION RESPIRATORY (INHALATION) at 13:10

## 2018-08-05 RX ADMIN — PANTOPRAZOLE SODIUM 40 MG: 40 TABLET, DELAYED RELEASE ORAL at 05:57

## 2018-08-05 RX ADMIN — INSULIN LISPRO 12 UNITS: 100 INJECTION, SOLUTION INTRAVENOUS; SUBCUTANEOUS at 11:26

## 2018-08-05 RX ADMIN — INSULIN LISPRO 18 UNITS: 100 INJECTION, SOLUTION INTRAVENOUS; SUBCUTANEOUS at 16:18

## 2018-08-05 RX ADMIN — BUDESONIDE AND FORMOTEROL FUMARATE DIHYDRATE 2 PUFF: 80; 4.5 AEROSOL RESPIRATORY (INHALATION) at 19:26

## 2018-08-05 RX ADMIN — METHYLPREDNISOLONE SODIUM SUCCINATE 40 MG: 40 INJECTION, POWDER, FOR SOLUTION INTRAMUSCULAR; INTRAVENOUS at 15:11

## 2018-08-05 RX ADMIN — INSULIN GLARGINE 48 UNITS: 100 INJECTION, SOLUTION SUBCUTANEOUS at 21:47

## 2018-08-05 RX ADMIN — ASPIRIN 81 MG: 81 TABLET, COATED ORAL at 09:45

## 2018-08-05 RX ADMIN — GABAPENTIN 100 MG: 100 CAPSULE ORAL at 17:17

## 2018-08-05 RX ADMIN — INSULIN LISPRO 10 UNITS: 100 INJECTION, SOLUTION INTRAVENOUS; SUBCUTANEOUS at 11:27

## 2018-08-05 RX ADMIN — AMLODIPINE BESYLATE 10 MG: 10 TABLET ORAL at 09:46

## 2018-08-05 RX ADMIN — INSULIN LISPRO 12 UNITS: 100 INJECTION, SOLUTION INTRAVENOUS; SUBCUTANEOUS at 16:18

## 2018-08-05 RX ADMIN — ENOXAPARIN SODIUM 40 MG: 40 INJECTION SUBCUTANEOUS at 09:45

## 2018-08-05 RX ADMIN — INSULIN LISPRO 10 UNITS: 100 INJECTION, SOLUTION INTRAVENOUS; SUBCUTANEOUS at 06:42

## 2018-08-05 RX ADMIN — LABETALOL HYDROCHLORIDE 200 MG: 100 TABLET, FILM COATED ORAL at 09:45

## 2018-08-05 RX ADMIN — IPRATROPIUM BROMIDE AND ALBUTEROL SULFATE 3 ML: .5; 3 SOLUTION RESPIRATORY (INHALATION) at 04:32

## 2018-08-05 RX ADMIN — IPRATROPIUM BROMIDE AND ALBUTEROL SULFATE 3 ML: .5; 3 SOLUTION RESPIRATORY (INHALATION) at 17:07

## 2018-08-05 RX ADMIN — GABAPENTIN 100 MG: 100 CAPSULE ORAL at 09:46

## 2018-08-05 RX ADMIN — LABETALOL HYDROCHLORIDE 200 MG: 100 TABLET, FILM COATED ORAL at 17:17

## 2018-08-05 NOTE — PROGRESS NOTES
Progress Note - Alessia Ayala 1960, 59 y o  female MRN: [id number]    Unit/Bed#: 7T [facility] 710-01 Encounter: [id number]    Primary Care Provider: Isabel Jaeger MD   Date and time admitted to hospital: 8/4/2018 11:31 AM        * Exertional dyspnea   Assessment & Plan    · Updated the patient feels so much better her shortness of breath has tremendously improved she has completely no chest pain ambulating without any dizziness her wheezing has improved, EKG done today just some PVCs QT is 477 down from 478 yesterday and previous was for 490s , satting well she is not in any distress she is today's not using any accessory muscles there is no stridor  D-dimer was elevated but reviewed CT PE study no PE there is a 9 mm nodule noticed for which recommendation repeat a CT in 6-12 months  Troponins are negative  Awaiting Pulmonary to evaluate her tomorrow but this is most likely the asthma exacerbation, will continue the steroids 40 q 8 for 1 more day re-evaluate tomorrow for taper continue nebulizer treatments q 4 acute 2 p r n  Karen Lightning · Patient comes in with exertional the dyspnea actually started yesterday  She is having wheezing start or inspiratory apparently alleviated with her pumps at home yesterday  · Patient has been having ongoing exertional dyspnea issues since February 2018 she has been evaluated by pulmonology at [address] [place] [address] Dr Herlene Najjar air and also her own cardiologist as well  · Although patient denies I reviewed in the records and pulmonology that it stated that she was diagnosed with history of asthma in the past  · She also has mild sleep apnea but she has not followed up with the sleep doctor to discuss the CPAP options she has not followed up since last pulmonology appointment to have PFTs done at either  · She was recently hospitalized at the [address] [place] [address]  that in June 2018 had an echo done with normal systolic and diastolic function it showed severe concentric hypertrophy    · Right now she sounds like an acute asthma exacerbation  She is not hypoxic her oxygen saturation 96% the low she is using accessory muscles I will provide her Solu-Medrol 40 mg IV q 8 hours will give her magnesium sulfate 2 g over 20 minutes and also DuoNeb treatments q 4 hours and q 2 hours p r n  I am awaiting a D-dimer evaluation  Her chest x-ray has no pulmonary edema infiltrate  She denies any paroxysmal nocturnal dyspnea she states she sleeps a little bit elevated due to breathing issues  · Troponin x1 was negative she had a lactic scan done on previous hospitalization which was positive and after which follow-up follow up with her cardiologist Dr Javier Hawley he thought most likely was false-positive but as I stated her echo was normal EF done in June 2018  I will repeat another EKG in the morning I did an EKG now no acute ischemia noticed, troponins x3     · Oxygen to keep oxygen saturation greater than 92%           Mild intermittent asthma with exacerbation   Assessment & Plan    · At I reviewed of pulmonology is note patient was evaluated by Dr Ashlie Harrell may air in March 2018 patient denies although it is stated that she was diagnosed with asthma in the past and she does have seasonal allergies apparently she went out yesterday to [facility] and she developed the wheezing and shortness of breath which was alleviated by her pump at home  · She does have expiratory expiratory wheezing  She is using accessory muscles I will continue her DuoNeb treatments q 4 q 2 hours out ask Pulmonary to evaluate her again patient never actually followed up and have the pulmonary functions done  · Continue Solu-Medrol 40 mg IV q 8 hours tomorrow re-evaluate for taper, continue the nebulizer treatment as is  Her wheezing is decreased and her airways are more open, CT PE study done no PE no infiltrates there is a 9 mm nodule recommendation repeat CT in 6-12 months  She never smoked    · Oxygen  · Will get an ABG to evaluate his CO2 status as well-was done yesterday reviewed-mixed arterial and venous sample  · Pulmonology to evaluate tomorrow  · Chest x-ray is normal without any infiltration or pulmonary edema there is cardiomegaly  Lung nodules   Assessment & Plan    · Lung nodules less than 4 mm evaluated by pulmonology reviewed note low risk no need to have any further workup/follow-up  -not a CT showed there is a 1 not mm nodule recommendation repeat CT in 6-12 months and she already follows with Dr Geo Live as an outpatient        Prolonged QT interval   Assessment & Plan    · QTC is 478->477, lower than last time of 492, monitor magnesium potassium, medication, repeat EKG in the morning        Peripheral neuropathy   Assessment & Plan    · Continue gabapentin        Obesity   Assessment & Plan    · Will place her on low carb low calorie diet        Mild obstructive sleep apnea   Assessment & Plan    · Supposed to have outpatient evaluation for CPAP         Dyslipidemia   Assessment & Plan    · Continue statin        Seizures ([address] )   Assessment & Plan    · She had when she was a child she has been on medications or had any seizures since then        Hypertension   Assessment & Plan    · Stable continue all ordered medication        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, with long-term current use of insulin [facility])   Assessment & Plan    Lab Results   Component Value Date    HGBA1C 11 1 (H) 06/25/2018       Recent Labs      08/04/18   1604  08/04/18 2011 08/05/18   0553   POCGLU  112*  348*  395*       Blood Sugar Average: Last 72 hrs:  · (P) 285   · Is uncontrolled as patient is on steroids increase her Lantus to 48 units at night and restart her pre meals 14 units pre breakfast 10 units for lunch 18 units for dinner also continue the sliding scale              VTE Pharmacologic Prophylaxis:   Pharmacologic: Enoxaparin (Lovenox)  Mechanical VTE Prophylaxis in Place: Yes    Patient Centered Rounds: I have performed bedside rounds with nursing staff today  Discussions with Specialists or Other Care Team Provider: yes    Education and Discussions with Family / Patient: patient    Time Spent for Care: 30 minutes  More than 50% of total time spent on counseling and coordination of care as described above  Current Length of Stay: 1 day(s)    Current Patient Status: Inpatient   Certification Statement: The patient will continue to require additional inpatient hospital stay due to Continued asthma treatment    Discharge Plan:  When medically stable    Code Status: Level 1 - Full Code      Subjective:   Patient is seen and examined she states she feels so much better there is no chest pain there is no dizziness her breathing is improved  Objective:     Vitals:   Temp (24hrs), Av 6 °F (36 4 °C), Min:97 3 °F (36 3 °C), Max:97 9 °F (36 6 °C)    HR:  [76-97] 96  Resp:  [18-26] 18  BP: (126-176)/(60-86) 170/76  SpO2:  [93 %-99 %] 96 %  Body mass index is 45 62 kg/m²  Input and Output Summary (last 24 hours): Intake/Output Summary (Last 24 hours) at 18 0817  Last data filed at 18 0802   Gross per 24 hour   Intake              610 ml   Output             1900 ml   Net            -1290 ml       Physical Exam:     Physical Exam   Constitutional: She is oriented to person, place, and time  She appears well-developed and well-nourished  Obese   HENT:   Head: Normocephalic and atraumatic  Eyes: EOM are normal  Pupils are equal, round, and reactive to light  Neck: Normal range of motion  Neck supple  Cardiovascular: Normal rate, regular rhythm and normal heart sounds  Pulmonary/Chest: Effort normal  No stridor  She has wheezes (Bilateral expiratory wheezes but improved since yesterday more open airways I actually hear breath sounds today)  Abdominal: Soft  Bowel sounds are normal    Musculoskeletal: Normal range of motion   She exhibits edema (Trace nonpitting bilateral lower extremities)  Neurological: She is alert and oriented to person, place, and time  She has normal reflexes  Skin: Skin is warm  Psychiatric: She has a normal mood and affect  Additional Data:     Labs:      Results from last 7 days  Lab Units 08/05/18  0457  08/04/18  1146   WBC Thousand/uL 11 80*  --  8 70   HEMOGLOBIN g/dL 11 1*  --  11 2*   HEMATOCRIT % 35 4*  --  34 5*   PLATELETS Thousands/uL 281  < > 285   NEUTROS PCT %  --   --  64   LYMPHS PCT %  --   --  23   MONOS PCT %  --   --  6   EOS PCT %  --   --  7*   < > = values in this interval not displayed  Results from last 7 days  Lab Units 08/05/18  0457   SODIUM mmol/L 137   POTASSIUM mmol/L 4 7   CHLORIDE mmol/L 102   CO2 mmol/L 23   BUN mg/dL 34*   CREATININE mg/dL 0 91   CALCIUM mg/dL 10 0   GLUCOSE RANDOM mg/dL 443*           Results from last 7 days  Lab Units 08/05/18  0553 08/04/18 2011 08/04/18  1604   POC GLUCOSE mg/dl 395* 348* 112*             * I Have Reviewed All Lab Data Listed Above  * Additional Pertinent Lab Tests Reviewed:  All Labs Within Last 24 Hours Reviewed    Imaging:    Imaging Reports Reviewed Today Include: yes  Imaging Personally Reviewed by Myself Includes:  no    Recent Cultures (last 7 days):           Last 24 Hours Medication List:     Current Facility-Administered Medications:  amLODIPine 10 mg Oral Daily Kobe Brown MD   aspirin 81 mg Oral Daily Kobe Brown MD   atorvastatin 40 mg Oral Daily Kobe Brown MD   budesonide-formoterol 2 puff Inhalation BID Kobe Brown MD   enoxaparin 40 mg Subcutaneous Daily Kobe Brown MD   gabapentin 100 mg Oral BID Kobe Brown MD   insulin glargine 48 Units Subcutaneous HS Kobe Brown MD   insulin lispro 10 Units Subcutaneous Daily With Lunch Kobe Brown MD   insulin lispro 14 Units Subcutaneous Daily With Breakfast Kobe Brown MD   insulin lispro 18 Units Subcutaneous Daily With Bakari Norton MD   insulin lispro 2-12 Units Subcutaneous TID AC Verne Heimlich, MD   ipratropium-albuterol 3 mL Nebulization Q2H PRN Verne Heimlich, MD   ipratropium-albuterol 3 mL Nebulization Q4H Verne Heimlich, MD   labetalol 200 mg Oral BID Verne Heimlich, MD   methylPREDNISolone sodium succinate 40 mg Intravenous [facility] Verne Heimlich, MD   pantoprazole 40 mg Oral Early Morning Verne Heimlich, MD   spironolactone 25 mg Oral Daily Verne Heimlich, MD   valsartan-hydrochlorothiazide (DIOVAN /12  5) combo dose  Oral Daily Verne Heimlich, MD        Today, Patient Was Seen By: Verne Heimlich, MD    ** Please Note: Dictation voice to text software may have been used in the creation of this document   **

## 2018-08-05 NOTE — NURSING NOTE
Patient appears to be resting comfortably in bed in high chavez's position; eyes closed and chest movements noted  Patient woke easily for vital signs and had no complaints of pain or discomfort at this time  Patient is calm, pleasant, and cooperative with care  Patient is awake and oriented times four  Vital signs remain stable and continues in normal sinus rhythm on the monitor  Call bell in reach and bed in low position  Assessment other wise unchanged at this time, will continue to monitor

## 2018-08-05 NOTE — NURSING NOTE
Pt sitting oob in chair  BP elevated 164/88 mmHg  Otherwise VSS  Fine hand tremors  SR on monitor, with PVCs  Mild expiratory wheezing in Upper lung fields  Pt denies SOB  Continues with dry cough  Educated on hyper/hypo glycemia  Call bell in reach  Will continue to monitor

## 2018-08-05 NOTE — CASE MANAGEMENT
Initial Clinical Review    Admission: Date/Time/Statement: 8/4/18 @ 1231 INPATIENT    Orders Placed This Encounter   Procedures    Inpatient Admission (expected length of stay for this patient is greater than two midnights)     Standing Status:   Standing     Number of Occurrences:   1     Order Specific Question:   Admitting Physician     Answer:   Marisol Potter [[id number]]     Order Specific Question:   Level of Care     Answer:   Med Surg [16]     Order Specific Question:   Estimated length of stay     Answer:   More than 2 Midnights     Order Specific Question:   Certification     Answer:   I certify that inpatient services are medically necessary for this patient for a duration of greater than two midnights  See H&P and MD Progress Notes for additional information about the patient's course of treatment  ED: Date/Time/Mode of Arrival:   ED Arrival Information     Expected Arrival Acuity Means of Arrival Escorted By Service Admission Type    - 8/4/2018 11:11 Urgent Walk-In Family Member General Medicine Urgent    Arrival Complaint    difficulty breathing          Chief Complaint:   Chief Complaint   Patient presents with    Shortness of Breath     "Since last night I have SOB  I used my pump and it helped a little but now it's not helping any more"  Reports that her chest is tight as well and that she feels lightheaded       History of Illness:    Patient is a 66-year-old female with a history of high blood pressure diabetes who presents with a 2 day history of dyspnea on exertion  Patient states started last night,  And has continued since  Worse with exertion no real improvement with rest nor with her albuterol inhaler  Patient denies history of asthma nor CHF  Does complain of some generalized chest tightness  Had similar episodes earlier this year was admitted for pneumonia after about of initial bronchitis per patient         ED Vital Signs:   ED Triage Vitals   Temperature Pulse Respirations Blood Pressure SpO2   08/04/18 1129 08/04/18 1129 08/04/18 1129 08/04/18 1129 08/04/18 1129   97 8 °F (36 6 °C) 82 (!) 26 160/83 96 %   Pain Score        Wt Readings from Last 1 Encounters:   08/05/18 (!) 138 kg (304 lb 7 3 oz)     Physical Exam:  Pulmonary/Chest: She is in respiratory distress  Tachypneic,    inspiratory and expiratory wheezes bilateral lower fields  Vital Signs (abnormal):     Date/Time  Temp  Pulse  Resp  BP  SpO2  O2 Device    08/04/18 1700  --  90   26  --  99 %  Nasal cannula    08/04/18 1400   97 4 °F (36 3 °C)  79   26   176/86  96 %  Nasal cannula 2L O2   08/04/18 1129  97 8 °F (36 6 °C)  82   26  160/83  96 %  None (Room air)      Abnormal Labs/Diagnostic Test Results:     CTA chest PE study: No occlusive pulmonary embolism  Mosaic attenuation of the lung parenchyma with areas of groundglass density and lucent areas, may be due to small airway disease  Chest x-ray: cardiomegaly  08/04/18 1234     D-DIMER <0 53 mg/L 0 53         EKG: NSR      ED Treatment:   Medication Administration from 08/04/2018 1111 to 08/04/2018 1416       Date/Time Order Dose Route Action     08/04/2018 1153 albuterol inhalation solution 5 mg 5 mg Nebulization Given     08/04/2018 1153 ipratropium (ATROVENT) 0 02 % inhalation solution 0 5 mg 0 5 mg Nebulization Given     08/04/2018 1237 albuterol inhalation solution 5 mg 5 mg Nebulization Given          Past Medical/Surgical History:    Active Ambulatory Problems     Diagnosis Date Noted    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, with long-term current use of insulin (HCC)     Hypertension     Seizures ([address] )     Exertional dyspnea 02/13/2018    Enlarged pulmonary artery [facility] [facility] 02/13/2018    Aortic dilatation ([facility] [address] ) 02/13/2018    Anemia 03/20/2012    Decreased pulses in feet 12/11/2017    Diabetes mellitus type 2 with complications, uncontrolled ([facility] [address] ) 09/08/2015    Dyslipidemia 05/07/2013    Fatigue 07/10/2015    Hyperlipidemia 03/20/2012    Hypoglycemia 11/02/2016    Microalbuminuria 09/08/2015    Mild obstructive sleep apnea 12/18/2017    Obesity 03/20/2012    Peripheral neuropathy 03/20/2012    Prolonged QT interval 12/18/2017    Scalp avulsion 08/21/2015    Visual impairment in both eyes 12/06/2012    Vitamin D deficiency 06/06/2014    Lung nodules 03/22/2018    Orthostatic hypotension 06/25/2018     Past Medical History:   Diagnosis Date    Anemia     Arthritis     [email or web address]     Dyspnea on exertion     Hyperlipidemia     Hypertension     Legally blind 2010    Mild intermittent asthma with exacerbation 8/4/2018    Miscarriage     Seizures (HCC)     Sickle cell trait ([facility] )        Admitting Diagnosis: SOB (shortness of breath) [R06 02]  Dyspnea on exertion [R06 09]    Age/Sex: 59 y o  female    Assessment/Plan:     * Exertional dyspnea   Assessment & Plan     · Patient comes in with exertional the dyspnea actually started yesterday  She is having wheezing start or inspiratory apparently alleviated with her pumps at home yesterday  · Patient has been having ongoing exertional dyspnea issues since February 2018 she has been evaluated by pulmonology at [facility] Dr Tricia pillai and also her own cardiologist as well  · She also has mild sleep apnea but she has not followed up with the sleep doctor to discuss the CPAP options she has not followed up since last pulmonology appointment to have PFTs done at either  · She was recently hospitalized at the [facility] that in June 2018 had an echo done with normal systolic and diastolic function it showed severe concentric hypertrophy  · Acute asthma exacerbation  She is using accessory muscles  Solu-Medrol 40 mg IV q 8 hours will give her magnesium sulfate 2 g over 20 minutes and also DuoNeb treatments q 4 hours and q 2 hours p r n  Her chest x-ray has no pulmonary edema infiltrate    She denies any paroxysmal nocturnal dyspnea she states she sleeps a little bit elevated due to breathing issues  · Troponin x1 was negative she had a lactic scan done on previous hospitalization which was positive and after which follow-up follow up with her cardiologist Dr Susanne Cleaning he thought most likely was false-positive but as I stated her echo was normal EF done in June 2018  I will repeat another EKG in the morning I did an EKG now no acute ischemia noticed, troponins x3   · Oxygen to keep oxygen saturation greater than 92%          Chest pain on breathing   Assessment & Plan     · She is experiencing chest tightness associated with asthma exacerbation bronchospasm less likely ACS she had a previous echo done which was essentially normal except is the severe concentric hypertrophy  EKG has no acute ischemic changes troponins x1 was negative will repeat another 2 repeat EKG in the morning apparently listed allergy to aspirin in the previous notes  · Chest x-ray reveals cardiomegaly          Prolonged QT interval   Assessment & Plan     · QTC is 478, lower than last time of 492, monitor magnesium potassium, medication, repeat EKG in the morning          Hypertension   Assessment & Plan     · Elevated and she has a respiratory issue-restart her home medications also monitor for orthostatic hypotension as she does have previously she also stated when she gets up at times she does get lightheaded about to pass out suspect this could be secondary to hypoxia may be as well as also associated when she is short of breath on exertion but also evaluate for orthostatic hypotension  She was hospitalized and evaluated for your rate previously is not a new problem as stated echo was done normal EF no aortic stenosis            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, with long-term current use of insulin (HCC)   Assessment & Plan             Lab Results   Component Value Date     HGBA1C 11 1 (H) 06/25/2018         Bood Sugar Average: Last 72 hrs:  ·  I will continue her home Lantus 43 units at night I will hold off on scheduled short-acting insulins with meals for now will place on sliding scale adjust as needed                  VTE Prophylaxis: Enoxaparin (Lovenox)  / sequential compression device       Admission Orders:    Pulmonology consult, continuous cardiac monitoring, sequential compression device, nasal cannula oxygen  Scheduled Meds:   Current Facility-Administered Medications:  amLODIPine 10 mg Oral Daily   aspirin 81 mg Oral Daily   atorvastatin 40 mg Oral Daily   budesonide-formoterol 2 puff Inhalation BID   enoxaparin 40 mg Subcutaneous Daily   gabapentin 100 mg Oral BID   insulin glargine 48 Units Subcutaneous HS   insulin lispro 10 Units Subcutaneous Daily With Lunch   insulin lispro 14 Units Subcutaneous Daily With Breakfast   insulin lispro 18 Units Subcutaneous Daily With Dinner   insulin lispro 2-12 Units Subcutaneous TID AC   ipratropium-albuterol 3 mL Nebulization Q2H PRN   ipratropium-albuterol 3 mL Nebulization Q4H   labetalol 200 mg Oral BID   methylPREDNISolone sodium succinate 40 mg Intravenous Q8H [address]   pantoprazole 40 mg Oral Early Morning   spironolactone 25 mg Oral Daily   valsartan-hydrochlorothiazide (DIOVAN /12  5) combo dose  Oral Daily     Magnesium sulfate 2g/50 ml IVPB                     Intravenous           Once        Thank you,  Shanae Hebert  [facility] Utilization Review Department  Phone: [phone]; Fax [phone]  ATTENTION: The Network Utilization Review Department is now centralized for our 9 Facilities  Make a note that we have a new phone and fax numbers for our Department  Please call with any questions or concerns to [phone] and carefully follow the prompts so that you are directed to the right person  All voicemails are confidential  Fax any determinations, approvals, denials, and requests for initial or continue stay review clinical to [phone]   Due to HIGH CALL volume, it would be easier if you could please send faxed requests to expedite your requests and in part, help us provide discharge notifications faster

## 2018-08-05 NOTE — ASSESSMENT & PLAN NOTE
· Lung nodules less than 4 mm evaluated by pulmonology reviewed note low risk no need to have any further workup/follow-up  -not a CT showed there is a 1 not mm nodule recommendation repeat CT in 6-12 months and she already follows with Dr Nadine Jaeger as an outpatient

## 2018-08-05 NOTE — ASSESSMENT & PLAN NOTE
· At I reviewed of pulmonology is note patient was evaluated by Dr Mirna pillai in March 2018 patient denies although it is stated that she was diagnosed with asthma in the past and she does have seasonal allergies apparently she went out yesterday to walk over to the rental place and she developed the wheezing and shortness of breath which was alleviated by her pump at home  · She does have expiratory expiratory wheezing  She is using accessory muscles I will continue her DuoNeb treatments q 4 q 2 hours out ask Pulmonary to evaluate her again patient never actually followed up and have the pulmonary functions done  · Continue Solu-Medrol 40 mg IV q 8 hours tomorrow re-evaluate for taper, continue the nebulizer treatment as is  Her wheezing is decreased and her airways are more open, CT PE study done no PE no infiltrates there is a 9 mm nodule recommendation repeat CT in 6-12 months  She never smoked  · Oxygen  · Will get an ABG to evaluate his CO2 status as well-was done yesterday reviewed-mixed arterial and venous sample  · Pulmonology to evaluate tomorrow  · Chest x-ray is normal without any infiltration or pulmonary edema there is cardiomegaly

## 2018-08-05 NOTE — ASSESSMENT & PLAN NOTE
· Updated the patient feels so much better her shortness of breath has tremendously improved she has completely no chest pain ambulating without any dizziness her wheezing has improved, EKG done today just some PVCs QT is 477 down from 478 yesterday and previous was for 490s , satting well she is not in any distress she is today's not using any accessory muscles there is no stridor  D-dimer was elevated but reviewed CT PE study no PE there is a 9 mm nodule noticed for which recommendation repeat a CT in 6-12 months  Troponins are negative  Awaiting Pulmonary to evaluate her tomorrow but this is most likely the asthma exacerbation, will continue the steroids 40 q 8 for 1 more day re-evaluate tomorrow for taper continue nebulizer treatments q 4 acute 2 p r n  Marcos Rm · Patient comes in with exertional the dyspnea actually started yesterday  She is having wheezing start or inspiratory apparently alleviated with her pumps at home yesterday  · Patient has been having ongoing exertional dyspnea issues since February 2018 she has been evaluated by pulmonology at [facility] Dr Rivas pillai and also her own cardiologist as well  · Although patient denies I reviewed in the records and pulmonology that it stated that she was diagnosed with history of asthma in the past  · She also has mild sleep apnea but she has not followed up with the sleep doctor to discuss the CPAP options she has not followed up since last pulmonology appointment to have PFTs done at either  · She was recently hospitalized at the [facility] that in June 2018 had an echo done with normal systolic and diastolic function it showed severe concentric hypertrophy  · Right now she sounds like an acute asthma exacerbation    She is not hypoxic her oxygen saturation 96% the low she is using accessory muscles I will provide her Solu-Medrol 40 mg IV q 8 hours will give her magnesium sulfate 2 g over 20 minutes and also DuoNeb treatments q 4 hours and q 2 hours p r n  I am awaiting a D-dimer evaluation  Her chest x-ray has no pulmonary edema infiltrate  She denies any paroxysmal nocturnal dyspnea she states she sleeps a little bit elevated due to breathing issues  · Troponin x1 was negative she had a lactic scan done on previous hospitalization which was positive and after which follow-up follow up with her cardiologist Dr Jacquelyn Lopez he thought most likely was false-positive but as I stated her echo was normal EF done in June 2018    I will repeat another EKG in the morning I did an EKG now no acute ischemia noticed, troponins x3     · Oxygen to keep oxygen saturation greater than 92%

## 2018-08-05 NOTE — NURSING NOTE
Pt resting in bed watching TV  VSS  SR on monitor  Trace edema to BLLE  Lungs clear  Dry cough  Denies SOB  Continet of bladder  LBM  8/4  Family at bedside  Call bell in reach  Will continue to monitor

## 2018-08-05 NOTE — ASSESSMENT & PLAN NOTE
· QTC is 478->477, lower than last time of 492, monitor magnesium potassium, medication, repeat EKG in the morning

## 2018-08-05 NOTE — ASSESSMENT & PLAN NOTE
Lab Results   Component Value Date    HGBA1C 11 1 (H) 06/25/2018       Recent Labs      08/04/18   1604  08/04/18 2011 08/05/18   0553   POCGLU  112*  348*  395*       Blood Sugar Average: Last 72 hrs:  · (P) 285   · Is uncontrolled as patient is on steroids increase her Lantus to 48 units at night and restart her pre meals 14 units pre breakfast 10 units for lunch 18 units for dinner also continue the sliding scale

## 2018-08-06 VITALS
RESPIRATION RATE: 18 BRPM | HEART RATE: 84 BPM | BODY MASS INDEX: 43.4 KG/M2 | SYSTOLIC BLOOD PRESSURE: 149 MMHG | OXYGEN SATURATION: 96 % | HEIGHT: 69 IN | DIASTOLIC BLOOD PRESSURE: 70 MMHG | WEIGHT: 293 LBS | TEMPERATURE: 98.2 F

## 2018-08-06 LAB
ATRIAL RATE: 81 BPM
GLUCOSE SERPL-MCNC: 343 MG/DL (ref 70–99)
GLUCOSE SERPL-MCNC: 387 MG/DL (ref 70–99)
P AXIS: 56 DEGREES
PR INTERVAL: 176 MS
QRS AXIS: 5 DEGREES
QRSD INTERVAL: 104 MS
QT INTERVAL: 398 MS
QTC INTERVAL: 462 MS
T WAVE AXIS: 39 DEGREES
VENTRICULAR RATE: 81 BPM

## 2018-08-06 PROCEDURE — 93010 ELECTROCARDIOGRAM REPORT: CPT | Performed by: INTERNAL MEDICINE

## 2018-08-06 PROCEDURE — 82948 REAGENT STRIP/BLOOD GLUCOSE: CPT

## 2018-08-06 PROCEDURE — 93005 ELECTROCARDIOGRAM TRACING: CPT

## 2018-08-06 PROCEDURE — 99239 HOSP IP/OBS DSCHRG MGMT >30: CPT | Performed by: FAMILY MEDICINE

## 2018-08-06 RX ORDER — PREDNISONE 10 MG/1
10 TABLET ORAL DAILY
Qty: 16 TABLET | Refills: 0 | Status: SHIPPED | OUTPATIENT
Start: 2018-08-06 | End: 2018-08-23 | Stop reason: ALTCHOICE

## 2018-08-06 RX ORDER — PANTOPRAZOLE SODIUM 40 MG/1
40 TABLET, DELAYED RELEASE ORAL
Qty: 14 TABLET | Refills: 0 | Status: SHIPPED | OUTPATIENT
Start: 2018-08-07 | End: 2018-10-12 | Stop reason: ALTCHOICE

## 2018-08-06 RX ORDER — BUDESONIDE AND FORMOTEROL FUMARATE DIHYDRATE 80; 4.5 UG/1; UG/1
2 AEROSOL RESPIRATORY (INHALATION) 2 TIMES DAILY
Qty: 1 INHALER | Refills: 0 | Status: SHIPPED | OUTPATIENT
Start: 2018-08-06 | End: 2018-10-26 | Stop reason: SDUPTHER

## 2018-08-06 RX ORDER — ALBUTEROL SULFATE 90 UG/1
2 AEROSOL, METERED RESPIRATORY (INHALATION) EVERY 6 HOURS PRN
Qty: 6.7 G | Refills: 0 | Status: SHIPPED | OUTPATIENT
Start: 2018-08-06 | End: 2020-10-07 | Stop reason: SDUPTHER

## 2018-08-06 RX ADMIN — LABETALOL HYDROCHLORIDE 200 MG: 100 TABLET, FILM COATED ORAL at 08:43

## 2018-08-06 RX ADMIN — INSULIN LISPRO 10 UNITS: 100 INJECTION, SOLUTION INTRAVENOUS; SUBCUTANEOUS at 12:06

## 2018-08-06 RX ADMIN — PANTOPRAZOLE SODIUM 40 MG: 40 TABLET, DELAYED RELEASE ORAL at 06:41

## 2018-08-06 RX ADMIN — SPIRONOLACTONE 25 MG: 25 TABLET, FILM COATED ORAL at 08:43

## 2018-08-06 RX ADMIN — ENOXAPARIN SODIUM 40 MG: 40 INJECTION SUBCUTANEOUS at 08:43

## 2018-08-06 RX ADMIN — AMLODIPINE BESYLATE 10 MG: 10 TABLET ORAL at 08:43

## 2018-08-06 RX ADMIN — INSULIN LISPRO 14 UNITS: 100 INJECTION, SOLUTION INTRAVENOUS; SUBCUTANEOUS at 08:41

## 2018-08-06 RX ADMIN — ATORVASTATIN CALCIUM 40 MG: 40 TABLET, FILM COATED ORAL at 08:43

## 2018-08-06 RX ADMIN — HYDROCHLOROTHIAZIDE: 25 TABLET ORAL at 08:45

## 2018-08-06 RX ADMIN — ASPIRIN 81 MG: 81 TABLET, COATED ORAL at 08:43

## 2018-08-06 RX ADMIN — GABAPENTIN 100 MG: 100 CAPSULE ORAL at 08:43

## 2018-08-06 RX ADMIN — INSULIN LISPRO 10 UNITS: 100 INJECTION, SOLUTION INTRAVENOUS; SUBCUTANEOUS at 06:42

## 2018-08-06 RX ADMIN — INSULIN LISPRO 8 UNITS: 100 INJECTION, SOLUTION INTRAVENOUS; SUBCUTANEOUS at 12:06

## 2018-08-06 RX ADMIN — METHYLPREDNISOLONE SODIUM SUCCINATE 40 MG: 40 INJECTION, POWDER, FOR SOLUTION INTRAMUSCULAR; INTRAVENOUS at 06:41

## 2018-08-06 NOTE — SOCIAL WORK
Pt admitted for asthma exacerbation- appointment with Dr Mari Bill, Pulmonology made for October with PFT's to be done August 14th- pt aware of both appointments  Pt is currently  with spouse in process of moving out of pt's house  Pt does not drive as she is legally blind and uses public transportation/[employer] to get to appointments  Currently pt works from home for an [employer] and receives [employer] as she is on permanent disability 2 DM  Pt was previously independent with her ADL's and household chores however does have a son, Jesika Sepulveda, who lives with her who is able to assist her if needed  Pt is to see a new PCP Dr Andrez Lopez with [facility] on 8/9 and uses [facility] on [address] for prescriptions  New medications being brought to floor by [facility]  on discharge  Pt to arrange a Lucero Lazier ride home on d/c  No further needs identified

## 2018-08-06 NOTE — ASSESSMENT & PLAN NOTE
Lab Results   Component Value Date    HGBA1C 11 1 (H) 06/25/2018       Recent Labs      08/05/18   0553  08/05/18   1115  08/05/18   2046  08/06/18   0608   POCGLU  395*  483*  353*  387*       Blood Sugar Average: Last 72 hrs:  · (P) 891 [id number]   · Sugars have been uncontrolled steroids 40 mg IV q 8 hours , steroids will be tapered and sugars at that point will be more controlled for now I did explain to the patient will increase her Lantus to 55 U her scheduled meals to 15 18 and 20 and she will monitor as an outpatient her sugars and titrate down or call her PCP with sugar results for insulin adjustments once her steroids have been tapered

## 2018-08-06 NOTE — ASSESSMENT & PLAN NOTE
· Secondary to acute asthma exacerbation all other things ruled out patient after the steroids and nebulizers feels great she was dancing at 2 in the morning without any shortness of breath she actually brought up a lot of sputum  · Will taper down her steroids to once daily as her sugars were also uncontrolled will DC on the taper, id continue the Symbicort and also given albuterol pump, she does follow with pulmonology Dr Corean Najjar an outpatient schedule appointment with them   · She had no PE on the CT her cardiac enzymes x3 were negative, once at the shortness of breath and asthma was stable her dizziness has resolved as well and slowly the chest tightness    · Needs to have outpatient pulmonary function test set up

## 2018-08-06 NOTE — NURSING NOTE
Pt  Given verbal DC instructions and AVS  Pt  Also present with script for PFT  Pt  Has medications brought up from pharmacy  Pt  Has all personal belonings accounted for  Currently no C/O pain,CP, or SOB  Pt  IV taken out by CNA, site is [facility], pain free  Volunteer called to take Pt  Down to lobby via wheelchair

## 2018-08-06 NOTE — ASSESSMENT & PLAN NOTE
· QTC is 478->477, lower than last time of 492, monitor magnesium potassium, medication, repeat EKG in the morning-improved to 462

## 2018-08-06 NOTE — DISCHARGE SUMMARY
Discharge- Kaitlynn Hampton 1960, 59 y o  female MRN: [id number]    Unit/Bed#: 7T [facility] 710-01 Encounter: [id number]    Primary Care Provider: Grace Lucas MD   Date and time admitted to hospital: 8/4/2018 11:31 AM        * Exertional dyspnea   Assessment & Plan    · Secondary to acute asthma exacerbation all other things ruled out patient after the steroids and nebulizers feels great she was dancing at 2 in the morning without any shortness of breath she actually brought up a lot of sputum  · Will taper down her steroids to once daily as her sugars were also uncontrolled will DC on the taper, id continue the Symbicort and also given albuterol pump, she does follow with pulmonology Dr Suzanna Singh an outpatient schedule appointment with them   · She had no PE on the CT her cardiac enzymes x3 were negative, once at the shortness of breath and asthma was stable her dizziness has resolved as well and slowly the chest tightness  · Needs to have outpatient pulmonary function test set up           Mild intermittent asthma with exacerbation   Assessment & Plan    · Improved sitting on room air lungs are clear just faint mild expiratory wheezes on the basis she is able to dance ambulate without any shortness of breath  · Will start tapering steroids as also her sugars are uncontrolled she will follow up with Dr Suzanna Singh as outpatient  · Continue Symbicort albuterol also discussed with her she is planning to do some exercise may be put albuterol 2 puffs 15- 30 min prior to exercise  · Outpatient pulmonary function test         Lung nodules   Assessment & Plan    · Lung nodules less than 4 mm evaluated by pulmonology reviewed note low risk no need to have any further workup/follow-up  -not a CT showed there is a 1 not mm nodule recommendation repeat CT in 6-12 months and she already follows with Dr Suzanna Singh as an outpatient        Prolonged QT interval   Assessment & Plan    · QTC is 478->477, lower than last time of 492, monitor magnesium potassium, medication, repeat EKG in the morning-improved to 462        Peripheral neuropathy   Assessment & Plan    · Continue gabapentin        Mild obstructive sleep apnea   Assessment & Plan    · Supposed to have outpatient evaluation for CPAP         Dyslipidemia   Assessment & Plan    · Continue statin        Seizures ([address] )   Assessment & Plan    · She had when she was a child she has been on medications or had any seizures since then        Hypertension   Assessment & Plan    · Stable continue all ordered medication        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, with long-term current use of insulin [facility])   Assessment & Plan    Lab Results   Component Value Date    HGBA1C 11 1 (H) 06/25/2018       Recent Labs      08/05/18   0553  08/05/18   1115  08/05/18   2046  08/06/18   0608   POCGLU  395*  483*  353*  387*       Blood Sugar Average: Last 72 hrs:  · (P) 966 [id number]   · Sugars have been uncontrolled steroids 40 mg IV q 8 hours , steroids will be tapered and sugars at that point will be more controlled for now I did explain to the patient will increase her Lantus to 55 U her scheduled meals to 15 18 and 20 and she will monitor as an outpatient her sugars and titrate down or call her PCP with sugar results for insulin adjustments once her steroids have been tapered                Discharging Physician / Practitioner: Lacie Mejia MD  PCP: Morris Cooper MD  Admission Date:   Admission Orders     Ordered        08/04/18 1231  Inpatient Admission (expected length of stay for this patient is greater than two midnights)  Once             Discharge Date: 08/06/18    Resolved Problems  Date Reviewed: 8/6/2018          Resolved    Chest pain on breathing 8/5/2018     Resolved by  Lacie Mejia MD          Consultations During Hospital Stay:  · Pulmonology    Procedures Performed:     · None    Significant Findings / Test Results:     · Chest x-ray stable cardiomegaly  · CT PE study-no PE-was a continuation of the lung parenchyma with areas of ground-glass density and lucent areas may be due to small airway disease/mild bronchiolitis  There is a new 9 mm ground-glass nodule in the right middle lobe area based on the current flushes sided 2017 guidelines recommended noncontrast CT 6-12 months  · EKG QTC is improved to 462    Incidental Findings:   · 9 mm nodule-follow-up noncontrast CT in 6-12 months     Test Results Pending at Discharge (will require follow up):   · none     Outpatient Tests Requested:  · PFTs    Complications:  none    Reason for Admission:  Shortness of breath    Hospital Course:     Abel Linn is a 59 y o  female patient who originally presented to the hospital on 8/4/2018 due to exertional shortness of breath while she was going out to pay her rent  Initially her pump help but then was not helping  She came in wheezing almost passed out  She had associated chest tightness and dizziness she was given nebulizer treatment and steroids to 24 hr she had a complete turn around she had a PE study done which was negative there is no CHF there was just an incidental 9 mm nodule found which was recommended CT noncontrast in 6-12 months  EKG had initially QTC prolongation which has almost resolved to 462 on day of discharge patient was on room air without any dyspnea she was able to ambulate and actually day and without developing shortness of breath  As she does follow with pulmonology Dr Telma Jacinto as an outpatient suggested to follow up with him  Also follow up with her sleep doctor in terms of the CPAP follow up with her PCP as well  I will also order her outpatient pulmonary function test in about 2 weeks after she completes her course of treatment    Also her sugars were uncontrolled here because she was in high dose of steroids discussed with the patient I will increase her insulin regiment while the taper is going to be going on and to measure sugars 4 times a day and to call her PCP with any further adjustment in terms of to deescalate or escalate the insulins  But as discussed with the patient as her steroids are going to continue to taper down her sugars will become more reasonable  Clear to DC home  Please see above list of diagnoses and related plan for additional information  Condition at Discharge: stable     Discharge Day Visit / Exam:     Subjective:  Patient seen and examined denies any chest pain or shortness of breath she called Doppler lot of mucus while she was dancing at 2 in the morning  No chest pain or dizziness  Vitals: Blood Pressure: 149/70 (08/06/18 0800)  Pulse: 84 (08/06/18 0800)  Temperature: 98 2 °F (36 8 °C) (08/06/18 0800)  Temp Source: Temporal (08/06/18 0800)  Respirations: 18 (08/06/18 0800)  Height: 5' 8 5" (174 cm) (08/04/18 1400)  Weight - Scale: (!) 138 kg (304 lb 14 3 oz) (08/06/18 0612)  SpO2: 96 % (08/06/18 0800)  Exam:   Physical Exam   Constitutional: She is oriented to person, place, and time  She appears well-developed and well-nourished  HENT:   Head: Normocephalic and atraumatic  Eyes: EOM are normal  Pupils are equal, round, and reactive to light  Neck: Normal range of motion  Cardiovascular: Normal rate, regular rhythm and normal heart sounds  Pulmonary/Chest: Effort normal    The chest x-ray essentially clear all airways are open unable to hear breath sounds just on the bases there is faint expiratory wheezing  Abdominal: Soft  Bowel sounds are normal    Musculoskeletal: Normal range of motion  Neurological: She is alert and oriented to person, place, and time  She has normal reflexes  Skin: Skin is warm  Psychiatric: She has a normal mood and affect  Discussion with Family: patient    Discharge instructions/Information to patient and family:   See after visit summary for information provided to patient and family        Provisions for Follow-Up Care:  See after visit summary for information related to follow-up care and any pertinent home health orders  Disposition:     Home    For Discharges to [facility] SNF:   · Not Applicable to this Patient - Not Applicable to this Patient    Planned Readmission: no     Discharge Statement:  I spent >35 minutes discharging the patient  This time was spent on the day of discharge  I had direct contact with the patient on the day of discharge  Greater than 50% of the total time was spent examining patient, answering all patient questions, arranging and discussing plan of care with patient as well as directly providing post-discharge instructions  Additional time then spent on discharge activities  Discharge Medications:  See after visit summary for reconciled discharge medications provided to patient and family        ** Please Note: This note has been constructed using a voice recognition system **

## 2018-08-06 NOTE — NURSING NOTE
Pt awake, alert listening to music  VS stable  SR on monitor  Denies pain/SOB/N/V  No new changes or complaints at this time  Will continue to monitor  Call bell in reach

## 2018-08-06 NOTE — ASSESSMENT & PLAN NOTE
· Lung nodules less than 4 mm evaluated by pulmonology reviewed note low risk no need to have any further workup/follow-up  -not a CT showed there is a 1 not mm nodule recommendation repeat CT in 6-12 months and she already follows with Dr John Doe as an outpatient

## 2018-08-06 NOTE — DISCHARGE INSTRUCTIONS
Asthma   WHAT YOU NEED TO KNOW:   Asthma is a lung disease that makes breathing difficult  Chronic inflammation and reactions to triggers narrow the airways in the lungs  Asthma can become life-threatening if it is not managed  DISCHARGE INSTRUCTIONS:   Seek care immediately if:   · You have severe shortness of breath  · Your lips or nails turn blue or gray  · The skin around your neck and ribs pulls in with each breath  · You have shortness of breath, even after you take your short-term medicine as directed  · Your peak flow numbers are in the red zone of your AAP  Contact your healthcare provider if:   · You run out of medicine before your next refill is due  · Your symptoms get worse  · You need to take more medicine than usual to control your symptoms  · You have questions or concerns about your condition or care  Medicines:   · Medicines  decrease inflammation, open airways, and make it easier to breathe  Medicines may be inhaled, taken as a pill, or injected  Short-term medicines relieve your symptoms quickly  Long-term medicines are used to prevent future attacks  You may also need medicine to help control your allergies  Ask your healthcare provider for more information about the medicine you are given and how to take it safely  · Take your medicine as directed  Contact your healthcare provider if you think your medicine is not helping or if you have side effects  Tell him or her if you are allergic to any medicine  Keep a list of the medicines, vitamins, and herbs you take  Include the amounts, and when and why you take them  Bring the list or the pill bottles to follow-up visits  Carry your medicine list with you in case of an emergency  Follow up with your healthcare provider as directed: You will need to return to make sure your medicine is working and your symptoms are controlled   You may be referred to an asthma specialist  Errol Powell may be asked to keep a record of your peak flow values and bring it with you to your appointments  Write down your questions so you remember to ask them  Manage your symptoms and prevent future attacks:   · Follow your Asthma Action Plan (AAP)  This is a written plan that you and your healthcare provider create  It explains which medicine you need and when to change doses if necessary  It also explains how you can monitor symptoms and use a peak flow meter  The meter measures how well your lungs are working  · Manage other health conditions , such as allergies, acid reflux, and sleep apnea  · Identify and avoid triggers  These may include pets, dust mites, mold, and cockroaches  · Do not smoke or be around others who smoke  Nicotine and other chemicals in cigarettes and cigars can cause lung damage  Ask your healthcare provider for information if you currently smoke and need help to quit  E-cigarettes or smokeless tobacco still contain nicotine  Talk to your healthcare provider before you use these products  · Ask about the flu vaccine  The flu can make your asthma worse  You may need a yearly flu shot  © 2017 [employer]  Information is for End User's use only and may not be sold, redistributed or otherwise used for commercial purposes  All illustrations and images included in CareNotes® are the copyrighted property of A D A M , Inc  or [employer] Hernández  The above information is an  only  It is not intended as medical advice for individual conditions or treatments  Talk to your doctor, nurse or pharmacist before following any medical regimen to see if it is safe and effective for you  Chronic Hypertension   WHAT YOU NEED TO KNOW:   What is chronic hypertension? Hypertension is high blood pressure (BP)  Your BP is the force of your blood moving against the walls of your arteries  Normal BP is less than 120/80  Prehypertension is between 120/80 and 139/89   Hypertension is 140/90 or higher  Hypertension causes your BP to get so high that your heart has to work much harder than normal  This can damage your heart  Chronic hypertension is a long-term condition that you can control with a healthy lifestyle or medicines  A controlled blood pressure helps protect your organs, such as your heart, lungs, brain, and kidneys  What increases my risk for chronic hypertension? · Age older than 54 years (men)    · Age older than 72 years (women)    · A family history of hypertension or heart disease     · Obesity or lack of exercise     · Eating too much sodium (salt)    · Stress     · Use of tobacco, alcohol, or illegal drugs     · A medical condition, such as diabetes, high cholesterol, or kidney disease    · Medicines, such as steroids or birth control pills  What are the signs and symptoms of chronic hypertension? You may have no signs or symptoms, or you may have any of the following:  · Headache     · Blurred vision    · Chest pain     · Dizziness or weakness     · Trouble breathing     · Nosebleeds  How is chronic hypertension diagnosed? Your healthcare provider will ask about your symptoms and the medicines you take  He will also ask if you have a family history of high blood pressure and about any health conditions you have  He will also check your blood pressure and weight and examine your heart, lungs, and eyes  You may need any of the following tests:  · Blood tests  may help healthcare providers find the cause of your hypertension  Blood tests can also help find other health problems caused by hypertension  · Urine tests  will be done to check your kidneys  How is chronic hypertension treated? Your healthcare provider will recommend lifestyle changes to lower your BP  You may also need the following medicines:  · Medicine  may be used to help lower your B  You may need more than one type of medicine  Take the medicine exactly as directed       · Diuretics  help decrease extra fluid that collects in your body  This will help lower your BP  You may urinate more often while you take this medicine  · Cholesterol medicine  helps lower your cholesterol level  A low cholesterol level helps prevent heart disease and makes it easier to control your blood pressure  What can I do to manage chronic hypertension? Talk with your healthcare provider about these and other ways to manage chronic hypertension:  · Take your BP at home  Sit and rest for 5 minutes before you take your BP  Extend your arm and support it on a flat surface  Your arm should be at the same level as your heart  Follow the directions that came with your BP monitor  If possible, take at least 2 BP readings each time  Take your BP at least twice a day at the same times each day, such as morning and evening  Keep a record of your BP readings and bring it to your follow-up visits  Ask your healthcare provider what your blood pressure should be  · Limit sodium (salt) as directed  Too much sodium can affect your fluid balance  Check labels to find low-sodium or no-salt-added foods  Some low-sodium foods use potassium salts for flavor  Too much potassium can also cause health problems  Your healthcare provider will tell you how much sodium and potassium are safe for you to have in a day  He or she may recommend that you limit sodium to 2,300 mg a day  · Follow the meal plan recommended by your healthcare provider  A dietitian or your provider can give you more information on low-sodium plans or the DASH (Dietary Approaches to Stop Hypertension) eating plan  The DASH plan is low in sodium, unhealthy fats, and total fat  It is high in potassium, calcium, and fiber  · Exercise to maintain a healthy weight  Exercise at least 30 minutes per day, on most days of the week  This will help decrease your blood pressure  Ask about the best exercise plan for you  · Decrease stress  This may help lower your BP  Learn ways to relax, such as deep breathing or listening to music  · Limit alcohol  Women should limit alcohol to 1 drink a day  Men should limit alcohol to 2 drinks a day  A drink of alcohol is 12 ounces of beer, 5 ounces of wine, or 1½ ounces of liquor  · Do not smoke  Nicotine and other chemicals in cigarettes and cigars can increase your BP and also cause lung damage  Ask your healthcare provider for information if you currently smoke and need help to quit  E-cigarettes or smokeless tobacco still contain nicotine  Talk to your healthcare provider before you use these products  Call 911 for any of the following:   · You have discomfort in your chest that feels like squeezing, pressure, fullness, or pain  · You become confused or have difficulty speaking  · You suddenly feel lightheaded or have trouble breathing  · You have pain or discomfort in your back, neck, jaw, stomach, or arm  When should I seek immediate care? · You have a severe headache or vision loss  · You have weakness in an arm or leg  When should I contact my healthcare provider? · You feel faint, dizzy, confused, or drowsy  · You have been taking your BP medicine and your BP is still higher than your healthcare provider says it should be  · You have questions or concerns about your condition or care  CARE AGREEMENT:   You have the right to help plan your care  Learn about your health condition and how it may be treated  Discuss treatment options with your caregivers to decide what care you want to receive  You always have the right to refuse treatment  The above information is an  only  It is not intended as medical advice for individual conditions or treatments  Talk to your doctor, nurse or pharmacist before following any medical regimen to see if it is safe and effective for you    © 2017 [employer] Information is for End User's use only and may not be sold, redistributed or otherwise used for commercial purposes  All illustrations and images included in CareNotes® are the copyrighted property of A D A M , Inc  or [employer] Hernández

## 2018-08-06 NOTE — RESPIRATORY THERAPY NOTE
RT Protocol Note  Astrid Owens 59 y o  female MRN: [id number]  Unit/Bed#: 7T [facility] 710-01 Encounter: [id number]    Assessment    Principal Problem:    Exertional dyspnea  Active Problems:    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, with long-term current use of insulin (HCC)    Hypertension    Seizures (HCC)    Dyslipidemia    Mild obstructive sleep apnea    Obesity    Peripheral neuropathy    Prolonged QT interval    Lung nodules    Mild intermittent asthma with exacerbation      Home Pulmonary Medications:  Home Devices/Therapy: Other (Comment)    Past Medical History:   Diagnosis Date    Anemia     Arthritis     Diabetes mellitus ([facility] )     Dyspnea on exertion     Hyperlipidemia     Hypertension     Legally blind 2010    Mild intermittent asthma with exacerbation 8/4/2018    Miscarriage     x 3    Seizures (HCC)     Sickle cell trait ([facility] )      Social History     Social History    Marital status: /Civil Union     Spouse name: N/A    Number of children: N/A    Years of education: N/A     Occupational History    employed      Social History Main Topics    Smoking status: Never Smoker    Smokeless tobacco: Never Used    Alcohol use No    Drug use: No    Sexual activity: No     Other Topics Concern    None     Social History Narrative    Caffeine use           Subjective    Subjective Data: Pt is much more comfertable    Objective    Physical Exam:   Assessment Type: Assess only  General Appearance: Alert, Awake  Respiratory Pattern: Normal  Chest Assessment: Chest expansion symmetrical  Bilateral Breath Sounds: Clear  L Breath Sounds: Expiratory wheezes  Cough: Dry, Non-productive    Vitals:  Blood pressure 138/77, pulse 76, temperature 97 5 °F (36 4 °C), temperature source Temporal, resp  rate 20, height 5' 8 5" (1 74 m), weight (!) 138 kg (304 lb 7 3 oz), SpO2 96 %, not currently breastfeeding        Results from last 7 days  Lab Units 08/04/18  1531    ART 7 340*   PCO2 ART mm Hg 53 0*   PO2 ART mm Hg 36 0*   HCO3 ART mmol/L 28 6*   BASE EXC ART mmol/L 1 6   O2 CONTENT ART mL/dL 15 0*   O2 HGB, ARTERIAL % 69 9*   CALLUM TEST  Yes   NON VENT ROOM AIR % 28%       Imaging and other studies: I have personally reviewed pertinent reports  O2 Device: 2L/NC     Plan  Pt with no respiratory distress  Respiratory Plan: Mild Distress pathway        Resp Comments: Pt states breathing is much better

## 2018-08-06 NOTE — ASSESSMENT & PLAN NOTE
· Improved sitting on room air lungs are clear just faint mild expiratory wheezes on the basis she is able to dance ambulate without any shortness of breath  · Will start tapering steroids as also her sugars are uncontrolled she will follow up with Dr Yonas Shelby as outpatient  · Continue Symbicort albuterol also discussed with her she is planning to do some exercise may be put albuterol 2 puffs 15- 30 min prior to exercise      · Outpatient pulmonary function test

## 2018-08-07 ENCOUNTER — TRANSITIONAL CARE MANAGEMENT (OUTPATIENT)
Dept: INTERNAL MEDICINE CLINIC | Facility: CLINIC | Age: 58
End: 2018-08-07

## 2018-08-07 LAB — GLUCOSE SERPL-MCNC: 454 MG/DL (ref 70–99)

## 2018-08-07 NOTE — CASE MANAGEMENT
Jaymie Simmons RN Registered Nurse Addendum   Case Management Date of Service: 8/5/2018 12:45 PM         []Hide copied text  Initial Clinical Review     Admission: Date/Time/Statement: 8/4/18 @ 1231 INPATIENT     Orders Placed This Encounter   Procedures    Inpatient Admission (expected length of stay for this patient is greater than two midnights)       Standing Status:   Standing       Number of Occurrences:   1       Order Specific Question:   Admitting Physician       Answer:   Celi Page [[id number]]       Order Specific Question:   Level of Care       Answer:   Med Surg [16]       Order Specific Question:   Estimated length of stay       Answer:   More than 2 Midnights       Order Specific Question:   Certification       Answer:   I certify that inpatient services are medically necessary for this patient for a duration of greater than two midnights  See H&P and MD Progress Notes for additional information about the patient's course of treatment       ED: Date/Time/Mode of Arrival:             ED Arrival Information      Expected Arrival Acuity Means of Arrival Escorted By Service Admission Type     - 8/4/2018 11:11 Urgent Walk-In Family Member General Medicine Urgent     Arrival Complaint     difficulty breathing             Chief Complaint:        Chief Complaint   Patient presents with    Shortness of Breath       "Since last night I have SOB  I used my pump and it helped a little but now it's not helping any more"  Reports that her chest is tight as well and that she feels lightheaded         History of Illness:     Patient is a 57-year-old female with a history of high blood pressure diabetes who presents with a 2 day history of dyspnea on exertion   Patient states started last night,  And has continued since    Worse with exertion no real improvement with rest nor with her albuterol inhaler   Patient denies history of asthma nor CHF   Does complain of some generalized chest tightness   Had similar episodes earlier this year was admitted for pneumonia after about of initial bronchitis per patient         ED Vital Signs:            ED Triage Vitals   Temperature Pulse Respirations Blood Pressure SpO2   08/04/18 1129 08/04/18 1129 08/04/18 1129 08/04/18 1129 08/04/18 1129   97 8 °F (36 6 °C) 82 (!) 26 160/83 96 %   Pain Score                Wt Readings from Last 1 Encounters:   08/05/18 (!) 138 kg (304 lb 7 3 oz)      Physical Exam:  Pulmonary/Chest: She is in respiratory distress  Tachypneic,    inspiratory and expiratory wheezes bilateral lower fields          Vital Signs (abnormal):      Date/Time   Temp   Pulse   Resp   BP   SpO2   O2 Device     08/04/18 1700   --   90    26   --   99 %   Nasal cannula     08/04/18 1400    97 4 °F (36 3 °C)   79    26    176/86   96 %   Nasal cannula 2L O2   08/04/18 1129   97 8 °F (36 6 °C)   82    26   160/83   96 %   None (Room air)        Abnormal Labs/Diagnostic Test Results:      CTA chest PE study: No occlusive pulmonary embolism  Mosaic attenuation of the lung parenchyma with areas of groundglass density and lucent areas, may be due to small airway disease      Chest x-ray: cardiomegaly  08/04/18 1234       D-DIMER <0 53 mg/L 0 53           EKG: NSR        ED Treatment:             Medication Administration from 08/04/2018 1111 to 08/04/2018 1416        Date/Time Order Dose Route Action       08/04/2018 1153 albuterol inhalation solution 5 mg 5 mg Nebulization Given       08/04/2018 1153 ipratropium (ATROVENT) 0 02 % inhalation solution 0 5 mg 0 5 mg Nebulization Given       08/04/2018 1237 albuterol inhalation solution 5 mg 5 mg Nebulization Given             Past Medical/Surgical History:         Active Ambulatory Problems     Diagnosis Date Noted    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, with long-term current use of insulin (HCC)      Hypertension      Seizures (HCC)      Exertional dyspnea 02/13/2018    Enlarged pulmonary artery ([facility] ) 02/13/2018    Aortic dilatation (HCC) 02/13/2018    Anemia 03/20/2012    Decreased pulses in feet 12/11/2017    Diabetes mellitus type 2 with complications, uncontrolled ([facility] ) 09/08/2015    Dyslipidemia 05/07/2013    Fatigue 07/10/2015    Hyperlipidemia 03/20/2012    Hypoglycemia 11/02/2016    Microalbuminuria 09/08/2015    Mild obstructive sleep apnea 12/18/2017    Obesity 03/20/2012    Peripheral neuropathy 03/20/2012    Prolonged QT interval 12/18/2017    Scalp avulsion 08/21/2015    Visual impairment in both eyes 12/06/2012    Vitamin D deficiency 06/06/2014    Lung nodules 03/22/2018    Orthostatic hypotension 06/25/2018           Past Medical History:   Diagnosis Date    Anemia      Arthritis      Diabetes mellitus (HCC)      Dyspnea on exertion      Hyperlipidemia      Hypertension      Legally blind 2010    Mild intermittent asthma with exacerbation 8/4/2018    Miscarriage      Seizures ([facility])      Sickle cell trait ([facility])           Admitting Diagnosis: SOB (shortness of breath) [R06 02]  Dyspnea on exertion [R06 09]     Age/Sex: 59 y o  female     Assessment/Plan:           * Exertional dyspnea   Assessment & Plan     · Patient comes in with exertional the dyspnea actually started yesterday   She is having wheezing start or inspiratory apparently alleviated with her pumps at home yesterday  · Patient has been having ongoing exertional dyspnea issues since February 2018 she has been evaluated by pulmonology at [facility] Dr Ashwin Lewis and also her own cardiologist as well  · She also has mild sleep apnea but she has not followed up with the sleep doctor to discuss the CPAP options she has not followed up since last pulmonology appointment to have PFTs done at either  · She was recently hospitalized at [facility] that in June 2018 had an echo done with normal systolic and diastolic function it showed severe concentric hypertrophy    · Acute asthma exacerbation  She is using accessory muscles  Solu-Medrol 40 mg IV q 8 hours will give her magnesium sulfate 2 g over 20 minutes and also DuoNeb treatments q 4 hours and q 2 hours p r n  Her chest x-ray has no pulmonary edema infiltrate   She denies any paroxysmal nocturnal dyspnea she states she sleeps a little bit elevated due to breathing issues  · Troponin x1 was negative she had a lactic scan done on previous hospitalization which was positive and after which follow-up follow up with her cardiologist Dr Deven Espitia he thought most likely was false-positive but as I stated her echo was normal EF done in June 2018  Bladimir Barber will repeat another EKG in the morning I did an EKG now no acute ischemia noticed, troponins x3   · Oxygen to keep oxygen saturation greater than 92%          Chest pain on breathing   Assessment & Plan     · She is experiencing chest tightness associated with asthma exacerbation bronchospasm less likely ACS she had a previous echo done which was essentially normal except is the severe concentric hypertrophy   EKG has no acute ischemic changes troponins x1 was negative will repeat another 2 repeat EKG in the morning apparently listed allergy to aspirin in the previous notes    · Chest x-ray reveals cardiomegaly          Prolonged QT interval   Assessment & Plan     · QTC is 478, lower than last time of 492, monitor magnesium potassium, medication, repeat EKG in the morning          Hypertension   Assessment & Plan     · Elevated and she has a respiratory issue-restart her home medications also monitor for orthostatic hypotension as she does have previously she also stated when she gets up at times she does get lightheaded about to pass out suspect this could be secondary to hypoxia may be as well as also associated when she is short of breath on exertion but also evaluate for orthostatic hypotension   She was hospitalized and evaluated for your rate previously is not a new problem as stated echo was done normal EF no aortic stenosis          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, with long-term current use of insulin ([facility])   Assessment & Plan                   Lab Results   Component Value Date     HGBA1C 11 1 (H) 06/25/2018         Bood Sugar Average: Last 72 hrs:  ·  I will continue her home Lantus 43 units at night I will hold off on scheduled short-acting insulins with meals for now will place on sliding scale adjust as needed                  VTE Prophylaxis: Enoxaparin (Lovenox)  / sequential compression device         Admission Orders:     Pulmonology consult, continuous cardiac monitoring, sequential compression device, nasal cannula oxygen         Scheduled Meds:   Current Facility-Administered Medications:  amLODIPine 10 mg Oral Daily   aspirin 81 mg Oral Daily   atorvastatin 40 mg Oral Daily   budesonide-formoterol 2 puff Inhalation BID   enoxaparin 40 mg Subcutaneous Daily   gabapentin 100 mg Oral BID   insulin glargine 48 Units Subcutaneous HS   insulin lispro 10 Units Subcutaneous Daily With Lunch   insulin lispro 14 Units Subcutaneous Daily With Breakfast   insulin lispro 18 Units Subcutaneous Daily With Dinner   insulin lispro 2-12 Units Subcutaneous TID AC   ipratropium-albuterol 3 mL Nebulization Q2H PRN   ipratropium-albuterol 3 mL Nebulization Q4H   labetalol 200 mg Oral BID   methylPREDNISolone sodium succinate 40 mg Intravenous Q8H [address]   pantoprazole 40 mg Oral Early Morning   spironolactone 25 mg Oral Daily   valsartan-hydrochlorothiazide (DIOVAN /12  5) combo dose   Oral Daily      Magnesium sulfate 2g/50 ml IVPB                      Intravenous           Once           Thank you,  Shanae Aqq  [facility] Utilization Review Department  Phone: [phone]; Fax [phone]  ATTENTION: The Network Utilization Review Department is now centralized for our 9 Facilities   Make a note that we have a new phone and fax numbers for our Department  Please call with any questions or concerns to [phone] and carefully follow the prompts so that you are directed to the right person  All voicemails are confidential  Fax any determinations, approvals, denials, and requests for initial or continue stay review clinical to [phone]   Due to HIGH CALL volume, it would be easier if you could please send faxed requests to expedite your requests and in part, help us provide discharge notifications faster          Revision History

## 2018-08-07 NOTE — CASE MANAGEMENT
[address] [facility] in the [facility] by [facility] Hern[facility]dez for 2017  Network Utilization Review Department  Phone: [phone]; Fax [phone]  ATTENTION: The Network Utilization Review Department is now centralized for our 7 Facilities  Please call with any questions or concerns to [phone] and carefully follow the prompts so that you are directed to the right person  All voicemails are confidential  Fax any determinations, approvals, denials, and requests for initial or continue stay review clinical to [phone]  Due to HIGH CALL volume, it would be easier if you could please send faxed requests to expedite your requests and in part, help us provide discharge notifications faster   /////////////////////////////////////////////////////////////////////////////////////////////////////////////////    Continued Stay Review    DOS  8/5/2018  97 3   94   20   170/76     Tele =  SR  Continuous oxygen @ 2L NC w/ sat  95 %  (weaned to RA on 8/5)   Duo nebs  q 4 hrs round the clock (changed to xopenex TID @ HS)   IV solumedrol 40 mg q 8 hrs    Assessment/Plan:  * Exertional dyspnea   Assessment & Plan     · Updated the patient feels so much better her shortness of breath has tremendously improved she has completely no chest pain ambulating without any dizziness her wheezing has improved, EKG done today just some PVCs QT is 477 down from 478 yesterday and previous was for 490s , satting well she is not in any distress she is today's not using any accessory muscles there is no stridor  D-dimer was elevated but reviewed CT PE study no PE there is a 9 mm nodule noticed for which recommendation repeat a CT in 6-12 months  Troponins are negative  Awaiting Pulmonary to evaluate her tomorrow but this is most likely the asthma exacerbation, will continue the steroids 40 q 8 for 1 more day re-evaluate tomorrow for taper continue nebulizer treatments q 4 acute 2 p r n          Mild intermittent asthma with exacerbation   Assessment & Plan     · Continue Solu-Medrol 40 mg IV q 8 hours tomorrow re-evaluate for taper, continue the nebulizer treatment as is  Her wheezing is decreased and her airways are more open, CT PE study done no PE no infiltrates there is a 9 mm nodule recommendation repeat CT in 6-12 months  She never smoked    · ABG yesterday -  mixed arterial and venous sample  · Pulmonology to evaluate tomorrow       Lung nodules   Assessment & Plan     ·  repeat CT in 6-12 months and she already follows with Dr Geri Hastings as an outpatient                     Discharge Plan: tbd

## 2018-08-08 NOTE — CASE MANAGEMENT
Notification of Discharge  This is a Notification of Discharge from our facility [address]  Please be advised that this patient has been discharge from our facility  Below you will find the admission and discharge date and time including the patients disposition  PRESENTATION DATE: 8/4/2018 11:31 AM  IP ADMISSION DATE: 8/4/18 1231  DISCHARGE DATE: 8/6/2018  3:45 PM  DISPOSITION: [address] [place] [address] [address] [place] [address] in the [facility] by [place] Utilization Review Department  Phone: [phone]; Fax [phone]  ATTENTION: The Network Utilization Review Department is now centralized for our 9 Facilities  Make a note that we have a new phone and fax numbers for our Department  Please call with any questions or concerns to [phone] and carefully follow the prompts so that you are directed to the right person  All voicemails are confidential  Fax any determinations, approvals, denials, and requests for initial or continue stay review clinical to [phone]  Due to HIGH CALL volume, it would be easier if you could please send faxed requests to expedite your requests and in part, help us provide discharge notifications faster

## 2018-08-09 ENCOUNTER — OFFICE VISIT (OUTPATIENT)
Dept: INTERNAL MEDICINE CLINIC | Facility: CLINIC | Age: 58
End: 2018-08-09
Payer: COMMERCIAL

## 2018-08-09 VITALS
DIASTOLIC BLOOD PRESSURE: 84 MMHG | WEIGHT: 293 LBS | HEIGHT: 69 IN | TEMPERATURE: 98.5 F | BODY MASS INDEX: 43.4 KG/M2 | RESPIRATION RATE: 16 BRPM | SYSTOLIC BLOOD PRESSURE: 156 MMHG | HEART RATE: 92 BPM

## 2018-08-09 DIAGNOSIS — I28.8 ENLARGED PULMONARY ARTERY (HCC): ICD-10-CM

## 2018-08-09 DIAGNOSIS — H54.3 VISUAL IMPAIRMENT IN BOTH EYES: ICD-10-CM

## 2018-08-09 DIAGNOSIS — J45.21 MILD INTERMITTENT ASTHMA WITH EXACERBATION: Primary | ICD-10-CM

## 2018-08-09 DIAGNOSIS — I10 ESSENTIAL HYPERTENSION: ICD-10-CM

## 2018-08-09 DIAGNOSIS — R80.9 MICROALBUMINURIA: ICD-10-CM

## 2018-08-09 DIAGNOSIS — IMO0002 UNCONTROLLED TYPE 2 DIABETES MELLITUS WITH OTHER OPHTHALMIC COMPLICATION, WITH LONG-TERM CURRENT USE OF INSULIN: ICD-10-CM

## 2018-08-09 PROCEDURE — 99214 OFFICE O/P EST MOD 30 MIN: CPT | Performed by: INTERNAL MEDICINE

## 2018-08-09 PROCEDURE — 1111F DSCHRG MED/CURRENT MED MERGE: CPT | Performed by: INTERNAL MEDICINE

## 2018-08-09 NOTE — ASSESSMENT & PLAN NOTE
Improved on recheck, continue current regimen at this time  If needed will titrate medication dose at next visit

## 2018-08-09 NOTE — ASSESSMENT & PLAN NOTE
Continue prednisone taper, Symbicort and rescue inhalers along with albuterol nebulization as recommended  Await PFT results next week    I suspect her dyspnea is multifactorial with her obesity, asthma, diastolic dysfunction,

## 2018-08-09 NOTE — ASSESSMENT & PLAN NOTE
Lab Results   Component Value Date    HGBA1C 11 1 (H) 06/25/2018       No results for input(s): POCGLU in the last 72 hours  Blood Sugar Average: Last 72 hrs:   not well controlled  Continue monitoring at this time, hoping to have better blood sugars once prednisone is completely stopped  Will see her back in 2 weeks  Titrate insulin Doses based on blood sugars results

## 2018-08-09 NOTE — ASSESSMENT & PLAN NOTE
Probably contributing to some dyspnea on exertion  CT scan did not show any PE  Awaiting pulmonary function test to be done next week

## 2018-08-14 ENCOUNTER — HOSPITAL ENCOUNTER (OUTPATIENT)
Dept: PULMONOLOGY | Facility: HOSPITAL | Age: 58
Discharge: HOME | End: 2018-08-14
Attending: FAMILY MEDICINE
Payer: COMMERCIAL

## 2018-08-14 DIAGNOSIS — J45.21 MILD INTERMITTENT ASTHMA WITH EXACERBATION: ICD-10-CM

## 2018-08-14 PROCEDURE — 94010 BREATHING CAPACITY TEST: CPT | Performed by: INTERNAL MEDICINE

## 2018-08-14 PROCEDURE — 94726 PLETHYSMOGRAPHY LUNG VOLUMES: CPT | Performed by: INTERNAL MEDICINE

## 2018-08-14 PROCEDURE — 94726 PLETHYSMOGRAPHY LUNG VOLUMES: CPT

## 2018-08-14 PROCEDURE — 94010 BREATHING CAPACITY TEST: CPT

## 2018-08-14 PROCEDURE — 94729 DIFFUSING CAPACITY: CPT | Performed by: INTERNAL MEDICINE

## 2018-08-14 PROCEDURE — 94729 DIFFUSING CAPACITY: CPT

## 2018-08-23 ENCOUNTER — OFFICE VISIT (OUTPATIENT)
Dept: INTERNAL MEDICINE CLINIC | Facility: CLINIC | Age: 58
End: 2018-08-23
Payer: COMMERCIAL

## 2018-08-23 VITALS
RESPIRATION RATE: 14 BRPM | BODY MASS INDEX: 43.4 KG/M2 | TEMPERATURE: 98.4 F | HEART RATE: 88 BPM | DIASTOLIC BLOOD PRESSURE: 70 MMHG | HEIGHT: 69 IN | OXYGEN SATURATION: 96 % | WEIGHT: 293 LBS | SYSTOLIC BLOOD PRESSURE: 130 MMHG

## 2018-08-23 DIAGNOSIS — I95.1 ORTHOSTATIC HYPOTENSION: Primary | ICD-10-CM

## 2018-08-23 DIAGNOSIS — R26.81 GAIT INSTABILITY: ICD-10-CM

## 2018-08-23 DIAGNOSIS — I10 ESSENTIAL HYPERTENSION: ICD-10-CM

## 2018-08-23 DIAGNOSIS — G63 POLYNEUROPATHY ASSOCIATED WITH UNDERLYING DISEASE (HCC): ICD-10-CM

## 2018-08-23 DIAGNOSIS — IMO0002 UNCONTROLLED TYPE 2 DIABETES MELLITUS WITH OTHER OPHTHALMIC COMPLICATION, WITH LONG-TERM CURRENT USE OF INSULIN: ICD-10-CM

## 2018-08-23 LAB — SL AMB POCT GLUCOSE BLD: 144

## 2018-08-23 PROCEDURE — 99214 OFFICE O/P EST MOD 30 MIN: CPT | Performed by: INTERNAL MEDICINE

## 2018-08-23 PROCEDURE — 3008F BODY MASS INDEX DOCD: CPT | Performed by: INTERNAL MEDICINE

## 2018-08-23 PROCEDURE — 82948 REAGENT STRIP/BLOOD GLUCOSE: CPT | Performed by: INTERNAL MEDICINE

## 2018-08-24 ENCOUNTER — TELEPHONE (OUTPATIENT)
Dept: INTERNAL MEDICINE CLINIC | Facility: CLINIC | Age: 58
End: 2018-08-24

## 2018-08-24 NOTE — ASSESSMENT & PLAN NOTE
Lab Results   Component Value Date    HGBA1C 11 1 (H) 06/25/2018       No results for input(s): POCGLU in the last 72 hours  Blood Sugar Average: Last 72 hrs:  improved but not at goal  Increase lantus to 60U, continue meal time insulin at current dose  Will switch to increased concentration of lantus for better absorption

## 2018-08-24 NOTE — PROGRESS NOTES
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, with long-term current use of insulin ([facility])  Lab Results   Component Value Date    HGBA1C 11 1 (H) 06/25/2018       No results for input(s): POCGLU in the last 72 hours  Blood Sugar Average: Last 72 hrs:  improved but not at goal  Increase lantus to 60U, continue meal time insulin at current dose  Will switch to increased concentration of lantus for better absorption  Hypertension  Improved on recheck  No significant orthostatic changes    Peripheral neuropathy  Likely cause of gait instability along with obesity and recent hospital stay  Will refer to physical therapy        Diagnoses and all orders for this visit:    Orthostatic hypotension    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, with long-term current use of insulin ([facility])  -     Insulin Glargine (TOUJEO MAX SOLOSTAR) 300 units/mL CONCETRATED U-300 injection pen; Inject 60 Units under the skin daily  -     POCT blood glucose  -     Basic metabolic panel  -     Hemoglobin A1C  -     Lipid Panel with Direct LDL reflex  -     CBC and differential  -     Microalbumin / creatinine urine ratio    Essential hypertension  -     Basic metabolic panel  -     Hemoglobin A1C  -     Lipid Panel with Direct LDL reflex  -     CBC and differential  -     Microalbumin / creatinine urine ratio    Gait instability  -     Ambulatory referral to Physical Therapy; Future    Polyneuropathy associated with underlying disease ([facility] )          Subjective:   Chief Complaint   Patient presents with    Follow-up     2 weeks        Patient ID: Catherine Miguel is a 59 y o  female  She comes in for follow up of HTN, DM, chronic dyspnea, peripheral neuropathy    Feels fatigue, unsteady on feet, feels like she will fall easily  Tries to walk as much as she can    BS are a little better since prednisone was stopped  Most of them are around 200      Breathing is somewhat stable  Cant walk too far without getting short of breath  Had significant wheezing last night for which used nebulizer  The following portions of the patient's history were reviewed and updated as appropriate: current medications, past medical history, past social history and past surgical history      PHQ-9 Depression Screening    PHQ-9:    Frequency of the following problems over the past two weeks:                Current Outpatient Prescriptions:     albuterol (PROVENTIL HFA) 90 mcg/act inhaler, Inhale 2 puffs every 6 (six) hours as needed for wheezing For another 24 hours use every 4 hours standing, Disp: 6 7 g, Rfl: 0    amLODIPine (NORVASC) 10 mg tablet, Take 1 tablet by mouth daily, Disp: , Rfl:     aspirin 81 MG tablet, Take 1 tablet by mouth daily, Disp: , Rfl:     atorvastatin (LIPITOR) 40 mg tablet, Take 1 tablet (40 mg total) by mouth daily, Disp: 90 tablet, Rfl: 1    23pressET LANCETS lancets, Inject 1 applicator into the skin 4 (four) times a day, Disp: , Rfl:     Blood Pressure Monitor KIT, Check blood pressures BID, Disp: 1 each, Rfl: 0    budesonide-formoterol (SYMBICORT) 80-4 5 MCG/ACT inhaler, Inhale 2 puffs 2 (two) times a day Rinse mouth after use , Disp: 1 Inhaler, Rfl: 0    Cholecalciferol (VITAMIN D3) 3000 units TABS, Take by mouth, Disp: , Rfl:     CONTOUR NEXT TEST test strip, 4 (four) times a day Test blood sugar, Disp: , Rfl: 1    gabapentin (NEURONTIN) 100 mg capsule, Take 1 capsule (100 mg total) by mouth 2 (two) times a day, Disp: 180 capsule, Rfl: 0    insulin lispro (HUMALOG KWIKPEN) 100 units/mL injection pen, Inject 10-18 Units under the skin 3 (three) times a day with meals 18 before breakfast 15 before lunch 20 before dinner plus sliding scale, Disp: 5 pen, Rfl: 0    Insulin Pen Needle (BD PEN NEEDLE DERRICK U/F) 32G X 4 MM MISC, Inject 1 applicator under the skin 4 (four) times a day, Disp: , Rfl:     labetalol (NORMODYNE) 200 mg tablet, Take 200 mg by mouth 2 (two) times a day, Disp: , Rfl:     metFORMIN (GLUCOPHAGE) 500 mg tablet, TAKE 2 TABLETS BY MOUTH WITH DINNER, Disp: 180 tablet, Rfl: 2    pantoprazole (PROTONIX) 40 mg tablet, Take 1 tablet (40 mg total) by mouth daily in the early morning, Disp: 14 tablet, Rfl: 0    spironolactone (ALDACTONE) 25 mg tablet, TAKE 1 TABLET BY MOUTH EVERY DAY, Disp: 90 tablet, Rfl: 0    valsartan-hydrochlorothiazide (DIOVAN-HCT) 160-12 5 MG per tablet, Take 1 tablet by mouth daily, Disp: 30 tablet, Rfl: 2    Insulin Glargine (TOUJEO MAX SOLOSTAR) 300 units/mL CONCETRATED U-300 injection pen, Inject 60 Units under the skin daily, Disp: 3 pen, Rfl: 0    Review of Systems   Constitutional: Positive for fatigue  Negative for fever and unexpected weight change  HENT: Negative for ear pain, hearing loss and sore throat  Eyes: Negative for pain and discharge  Respiratory: Negative for cough, chest tightness and shortness of breath  Cardiovascular: Negative for chest pain and palpitations  Gastrointestinal: Negative for abdominal pain, blood in stool, constipation, diarrhea and nausea  Genitourinary: Negative for dysuria, frequency and hematuria  Musculoskeletal: Negative for joint swelling  Skin: Negative for rash  Allergic/Immunologic: Negative for immunocompromised state  Neurological: Positive for numbness  Negative for dizziness and headaches  Hematological: Negative for adenopathy  Psychiatric/Behavioral: Negative for confusion and sleep disturbance  Objective:  /70 (Patient Position: Standing)   Pulse 88   Temp 98 4 °F (36 9 °C)   Resp 14   Ht 5' 8 5" (1 74 m)   Wt (!) 139 kg (306 lb 9 6 oz)   SpO2 96%   BMI 45 94 kg/m²      Physical Exam   Constitutional: She appears well-developed and well-nourished  HENT:   Head: Normocephalic and atraumatic     Right Ear: Tympanic membrane normal    Left Ear: Tympanic membrane normal    Nose: Nose normal    Mouth/Throat: Oropharynx is clear and moist  No posterior oropharyngeal edema or posterior oropharyngeal erythema  Eyes: Conjunctivae are normal  Pupils are equal, round, and reactive to light  Right eye exhibits no discharge  Neck: Normal range of motion  Neck supple  No thyromegaly present  Cardiovascular: Normal rate, regular rhythm, S1 normal, S2 normal and normal heart sounds  PMI is not displaced  No murmur heard  Pulmonary/Chest: Effort normal and breath sounds normal  No accessory muscle usage  No apnea  No respiratory distress  She has no rhonchi  She has no rales  Abdominal: Soft  Normal appearance and bowel sounds are normal  She exhibits no shifting dullness  There is no hepatosplenomegaly  There is no tenderness  There is no rebound and no CVA tenderness  Musculoskeletal: Normal range of motion  She exhibits no edema or tenderness  Unstable while walking,    Lymphadenopathy:     She has no cervical adenopathy  Neurological: She is alert  Skin: Skin is warm and intact  No rash noted  Psychiatric: She has a normal mood and affect  Her speech is normal    Nursing note and vitals reviewed          Recent Results (from the past 1008 hour(s))   Basic metabolic panel    Collection Time: 08/04/18 11:46 AM   Result Value Ref Range    Sodium 143 137 - 147 mmol/L    Potassium 4 1 3 6 - 5 0 mmol/L    Chloride 105 97 - 108 mmol/L    CO2 31 (H) 22 - 30 mmol/L    Anion Gap 7 5 - 14 mmol/L    BUN 29 (H) 5 - 25 mg/dL    Creatinine 0 84 0 60 - 1 20 mg/dL    Glucose 102 (H) 70 - 99 mg/dL    Calcium 9 4 8 4 - 10 2 mg/dL    eGFR 88 >60 ml/min/1 73sq m   CBC and differential    Collection Time: 08/04/18 11:46 AM   Result Value Ref Range    WBC 8 70 4 50 - 11 00 Thousand/uL    RBC 4 37 4 00 - 5 20 Million/uL    Hemoglobin 11 2 (L) 12 0 - 16 0 g/dL    Hematocrit 34 5 (L) 36 0 - 46 0 %    MCV 79 (L) 80 - 100 fL    MCH 25 7 (L) 26 0 - 34 0 pg    MCHC 32 6 31 0 - 36 0 g/dL    RDW 15 3 (H) <15 3 %    MPV 8 7 (L) 8 9 - 12 7 fL Platelets 380 351 - 736 Thousands/uL    Neutrophils Relative 64 45 - 65 %    Lymphocytes Relative 23 20 - 50 %    Monocytes Relative 6 1 - 10 %    Eosinophils Relative 7 (H) 0 - 6 %    Basophils Relative 1 0 - 1 %    Neutrophils Absolute 5 60 1 80 - 7 80 Thousands/µL    Lymphocytes Absolute 2 00 0 50 - 4 00 Thousands/µL    Monocytes Absolute 0 50 0 20 - 0 90 Thousand/µL    Eosinophils Absolute 0 60 (H) 0 00 - 0 40 Thousand/µL    Basophils Absolute 0 10 0 00 - 0 10 Thousands/µL   Troponin I    Collection Time: 08/04/18 11:46 AM   Result Value Ref Range    Troponin I <0 01 0 00 - 0 03 ng/mL   BNP    Collection Time: 08/04/18 11:46 AM   Result Value Ref Range    NT-proBNP 65 1 0 - 299 pg/mL   Smear Review(Phlebs Do Not Order)    Collection Time: 08/04/18 11:46 AM   Result Value Ref Range    RBC Morphology abnormal     Anisocytosis Present     Hypochromia Present     Platelet Estimate Adequate Adequate   ECG 12 lead    Collection Time: 08/04/18 11:47 AM   Result Value Ref Range    Ventricular Rate 78 BPM    Atrial Rate 78 BPM    WV Interval 188 ms    QRSD Interval 100 ms    QT Interval 420 ms    QTC Interval 478 ms    P [place] 64 degrees    QRS Axis 14 degrees    T Wave Axis 47 degrees   D-dimer, quantitative    Collection Time: 08/04/18 12:34 PM   Result Value Ref Range    D-DIMER 0 53 (H) <0 53 mg/L   Blood gas, arterial    Collection Time: 08/04/18  3:31 PM   Result Value Ref Range    pH, Arterial 7 340 (L) 7 350 - 7 450    pCO2, Arterial 53 0 (H) 35 0 - 45 0 mm Hg    pO2, Arterial 36 0 (LL) 80 0 - 105 0 mm Hg    HCO3, Arterial 28 6 (H) 22 0 - 26 0 mmol/L    Base Excess, Arterial 1 6 -2 1 - 2 1 mmol/L    O2 Content, Arterial 15 0 (L) 16 0 - 23 0 mL/dL    O2 HGB,Arterial  69 9 (L) 95 0 - 98 0 %    CALLUM TEST Yes     ROOM AIR FIO2 28% %    Nasal Cannula 2 LPM    Platelet count    Collection Time: 08/04/18  3:47 PM   Result Value Ref Range    Platelets 749 576 - 901 Thousands/uL   Troponin I    Collection Time: 08/04/18 3:47 PM   Result Value Ref Range    Troponin I <0 01 0 00 - 0 03 ng/mL   Fingerstick Glucose (POCT)    Collection Time: 08/04/18  4:04 PM   Result Value Ref Range    POC Glucose 112 (H) 70 - 99 mg/dl   Troponin I    Collection Time: 08/04/18  5:45 PM   Result Value Ref Range    Troponin I <0 01 0 00 - 0 03 ng/mL   Fingerstick Glucose (POCT)    Collection Time: 08/04/18  8:11 PM   Result Value Ref Range    POC Glucose 348 (H) 70 - 99 mg/dl   Basic metabolic panel    Collection Time: 08/05/18  4:57 AM   Result Value Ref Range    Sodium 137 137 - 147 mmol/L    Potassium 4 7 3 6 - 5 0 mmol/L    Chloride 102 97 - 108 mmol/L    CO2 23 22 - 30 mmol/L    Anion Gap 12 5 - 14 mmol/L    BUN 34 (H) 5 - 25 mg/dL    Creatinine 0 91 0 60 - 1 20 mg/dL    Glucose 443 (H) 70 - 99 mg/dL    Calcium 10 0 8 4 - 10 2 mg/dL    eGFR 80 >60 ml/min/1 73sq m   Magnesium    Collection Time: 08/05/18  4:57 AM   Result Value Ref Range    Magnesium 2 5 (H) 1 6 - 2 3 mg/dL   CBC (With Platelets)    Collection Time: 08/05/18  4:57 AM   Result Value Ref Range    WBC 11 80 (H) 4 50 - 11 00 Thousand/uL    RBC 4 41 4 00 - 5 20 Million/uL    Hemoglobin 11 1 (L) 12 0 - 16 0 g/dL    Hematocrit 35 4 (L) 36 0 - 46 0 %    MCV 80 80 - 100 fL    MCH 25 3 (L) 26 0 - 34 0 pg    MCHC 31 5 31 0 - 36 0 g/dL    RDW 15 7 (H) <15 3 %    Platelets 921 093 - 485 Thousands/uL    MPV 10 1 8 9 - 12 7 fL   Fingerstick Glucose (POCT)    Collection Time: 08/05/18  5:53 AM   Result Value Ref Range    POC Glucose 395 (H) 70 - 99 mg/dl   ECG 12 lead    Collection Time: 08/05/18  6:08 AM   Result Value Ref Range    Ventricular Rate 93 BPM    Atrial Rate 93 BPM    NE Interval 192 ms    QRSD Interval 106 ms    QT Interval 384 ms    QTC Interval 477 ms    P Axis 54 degrees    QRS Axis 2 degrees    T Wave Axis 37 degrees   Fingerstick Glucose (POCT)    Collection Time: 08/05/18 11:15 AM   Result Value Ref Range    POC Glucose 483 (H) 70 - 99 mg/dl   Fingerstick Glucose (POCT) Collection Time: 08/05/18  3:46 PM   Result Value Ref Range    POC Glucose 454 (H) 70 - 99 mg/dl   Fingerstick Glucose (POCT)    Collection Time: 08/05/18  8:46 PM   Result Value Ref Range    POC Glucose 353 (H) 70 - 99 mg/dl   Fingerstick Glucose (POCT)    Collection Time: 08/06/18  6:08 AM   Result Value Ref Range    POC Glucose 387 (H) 70 - 99 mg/dl   ECG 12 lead    Collection Time: 08/06/18  6:24 AM   Result Value Ref Range    Ventricular Rate 81 BPM    Atrial Rate 81 BPM    NC Interval 176 ms    QRSD Interval 104 ms    QT Interval 398 ms    QTC Interval 462 ms    P Axis 56 degrees    QRS Axis 5 degrees    T Wave Axis 39 degrees   Fingerstick Glucose (POCT)    Collection Time: 08/06/18 11:35 AM   Result Value Ref Range    POC Glucose 343 (H) 70 - 99 mg/dl   Complete pulmonary function test    Collection Time: 08/14/18  9:15 AM   Result Value Ref Range    FEV1  liters    FVC  liters    FEV1/FVC  %    TLC  liters    DLCO  ml/mmHg sec   POCT blood glucose    Collection Time: 08/23/18 10:16 AM   Result Value Ref Range     GLUCOSE     ]    Procedure: Vas Lower Limb Venous Duplex Study, Complete Bilateral    Result Date: 6/25/2018  Narrative:  [facility] REPORT [facility]INICAL: Indications: Patient presents with bilateral lower extremity edema  Risk Factors The patient has history of Obesity, HTN, Diabetes, non-smoker and hyperlipidemia  FINDINGS:  Segment  Right            Left              Impression       Impression       CFV      Normal (Patent)  Normal (Patent)     CONCLUSION:  Impression: RIGHT LOWER LIMB: No evidence of acute or chronic deep vein thrombosis  No evidence of superficial thrombophlebitis noted  Doppler evaluation shows a normal response to augmentation maneuvers  Popliteal, posterior tibial and anterior tibial arterial Doppler waveforms are triphasic  LEFT LOWER LIMB: No evidence of acute or chronic deep vein thrombosis  No evidence of superficial thrombophlebitis noted   Doppler evaluation shows a normal response to augmentation maneuvers  Popliteal, posterior tibial and anterior tibial arterial Doppler waveforms are triphasic  Technical findings were given to Dr Reji Box    SIGNATURE: Electronically Signed by: Mela Louie on 2018-06-25 08:29:03 PM

## 2018-08-24 NOTE — TELEPHONE ENCOUNTER
----- Message from Lance Carreno MD sent at 8/23/2018 10:34 PM EDT -----  I forgot to talk about her valsartan  Did she hear from her pharmacy about a recall  I would suggest to change it to losartan   Can you please confirm with her if she is ok with the change and which pharmacy is hould send it to

## 2018-08-24 NOTE — ASSESSMENT & PLAN NOTE
Likely cause of gait instability along with obesity and recent hospital stay   Will refer to physical therapy

## 2018-08-28 DIAGNOSIS — E11.65 TYPE 2 DIABETES MELLITUS WITH HYPERGLYCEMIA, UNSPECIFIED WHETHER LONG TERM INSULIN USE (HCC): ICD-10-CM

## 2018-08-28 DIAGNOSIS — I10 HYPERTENSION, UNSPECIFIED TYPE: ICD-10-CM

## 2018-08-28 DIAGNOSIS — E78.49 OTHER HYPERLIPIDEMIA: ICD-10-CM

## 2018-08-28 RX ORDER — ATORVASTATIN CALCIUM 40 MG/1
40 TABLET, FILM COATED ORAL DAILY
Qty: 90 TABLET | Refills: 0 | Status: SHIPPED | OUTPATIENT
Start: 2018-08-28 | End: 2018-10-21 | Stop reason: SDUPTHER

## 2018-08-28 RX ORDER — SPIRONOLACTONE 25 MG/1
25 TABLET ORAL DAILY
Qty: 90 TABLET | Refills: 0 | Status: SHIPPED | OUTPATIENT
Start: 2018-08-28 | End: 2018-10-21 | Stop reason: SDUPTHER

## 2018-09-06 ENCOUNTER — EVALUATION (OUTPATIENT)
Dept: PHYSICAL THERAPY | Facility: CLINIC | Age: 58
End: 2018-09-06
Payer: COMMERCIAL

## 2018-09-06 DIAGNOSIS — R26.81 GAIT INSTABILITY: ICD-10-CM

## 2018-09-06 PROCEDURE — G8978 MOBILITY CURRENT STATUS: HCPCS | Performed by: PHYSICAL MEDICINE & REHABILITATION

## 2018-09-06 PROCEDURE — 97162 PT EVAL MOD COMPLEX 30 MIN: CPT | Performed by: PHYSICAL MEDICINE & REHABILITATION

## 2018-09-06 PROCEDURE — G8979 MOBILITY GOAL STATUS: HCPCS | Performed by: PHYSICAL MEDICINE & REHABILITATION

## 2018-09-06 NOTE — PROGRESS NOTES
PT Evaluation     Today's date: 2018  Patient name: Karishma Mast  : 1960  MRN: [id number]  Referring provider: Rea Becerra MD  Dx:   Encounter Diagnosis     ICD-10-CM    1  Gait instability R26 81 Ambulatory referral to Physical Therapy                  Assessment    Assessment details: Patient presents with sgs/sxs consistent with referring diagnosis of gait instability  Peripheral neuropathy, visual impairment, and respiratory dysfunction likely contribute to dynamic imbalance  Possible orthostatic dysfunction, will monitor response with activity  Patient would benefit from skilled intervention to address current deficits and maximize return to [facility] and ensure safe community navigation  Understanding of Dx/Px/POC: good   Prognosis: good    Goals  4 weeks  1  Patient will maintain SLS bilaterally for greater than 10 seconds without external assist  2  Patient will complete 6 minute walk test   3  Patient will demonstrate at least 4+/5 ankle strength in all planes    Discharge  1  Patient will be independent with HEP  2  Patient will safely navigate 2 flights of steps to access home  3  Patient will improve FOTO to at least [address]  Patient would benefit from: skilled physical therapy  Planned therapy interventions: manual therapy, abdominal trunk stabilization, neuromuscular re-education, balance, functional ROM exercises, gait training, home exercise program, transfer training, therapeutic training, therapeutic exercise, therapeutic activities, stretching and strengthening  Frequency: 2x week  Duration in weeks: 6  Treatment plan discussed with: patient        Subjective Evaluation    History of Present Illness  Mechanism of injury: Patient presents with complaints of gait instability, present since losing vision in   Occular implants in place  Patient presents with [facility], has been using for about 3 months   Recent hospitalization secondary to LEON, SOB without relief of symptoms with typical nebulizer treatment  Recent diagnosis of asthma  Peripheral neuropathy reduces ambulation tolerance  Patient denies recent falls, however reports multiple near-falls  Pain  No pain reported          Objective     Ambulation     Comments   Patient ambulates on level surface with intermittent SPC use, slight right lateral trunk lean  Prior to ambulation:  SaO2 97% on RA, HR 75 bpm /70mmHg  Post 6 min walk test O2 92%, HR 95 with recovery to 98%, 85 bpm within 3 minutes of rest  * Discussed use of pursed lip breathing with patient during times of SOB to reduce early airway closure    Able to complete 5 min 13 sec of ambulation, 10 lengths in giraldo, increased SPC and lateral deviation after 5th length, R>L  - Decreased foot clearance with fatigue      Functional Assessment     Comments  R SLS 11 sec, L unable   Able to maintain tandem stance 20 seconds bilaterally, increased challenge with R foot forward  NBOS firm/foam EO/EC: able to maintain for 30 sec in all conditions, increased sway with foam conditions however able to correct with use of appropriate balance strategies    R ankle eversion 4-, L ankle inv 4, otherwise 4+/5          Precautions: Asthma, visual impairment, DM, peripheral neuropathy    Daily Treatment Diary     Manual              Vitals pre                                                                     Exercise Diary              Bike             Standing head turns --> walking             Tandem stance             SLS             Side stepping             HR/TR             R ankle TB 4 way             Step up/down             Standing march             Standing hip abd                                                                                                                                  Gait training                 Modalities

## 2018-09-12 ENCOUNTER — OFFICE VISIT (OUTPATIENT)
Dept: PHYSICAL THERAPY | Facility: CLINIC | Age: 58
End: 2018-09-12
Payer: COMMERCIAL

## 2018-09-12 DIAGNOSIS — R26.81 GAIT INSTABILITY: Primary | ICD-10-CM

## 2018-09-12 PROCEDURE — 97110 THERAPEUTIC EXERCISES: CPT | Performed by: PHYSICAL THERAPIST

## 2018-09-12 PROCEDURE — 97112 NEUROMUSCULAR REEDUCATION: CPT | Performed by: PHYSICAL THERAPIST

## 2018-09-12 NOTE — PROGRESS NOTES
Daily Note     Today's date: 2018  Patient name: Astrid Owens  : 1960  MRN: [id number]  Referring provider: Laurel Low MD  Dx:   Encounter Diagnosis     ICD-10-CM    1  Gait instability R26 81                   Subjective: Patient reports increased "lightheadedness and dizziness" with stair descent and when she performs multiple sit to stand transfers  Objective: See treatment diary below  Pre-workout: /102   Post-workout /88    Assessment: Patient tolerated first treatment well and denied any dizziness or onset of adverse effects following today's session  Patient challenged with static stability exercises today and was unable to perform single limb stability without use of UE rail support  Patient would benefit from continued therapy to decrease risk for falls  Plan: Per plan of care with emphasis on proximal hip strength and balance, as tolerated by patient         Precautions: Asthma, visual impairment, DM, peripheral neuropathy     Daily Treatment Diary      Manual                        Vitals [facility]                                                                                                                           Exercise Diary                        Bike  8'                     Standing head turns --> walking                       Tandem stance  2x 30" R,L                     SLS  4 x 10" R,L                     Side stepping  @ bar 8x                     HR/TR 30x                     R ankle TB 4 way                       Step up/down                       Standing march                       Standing hip abd/ext  20x R,L PTB                                                                                                                                                                                                                                             Gait training                             Modalities

## 2018-09-14 ENCOUNTER — APPOINTMENT (OUTPATIENT)
Dept: PHYSICAL THERAPY | Facility: CLINIC | Age: 58
End: 2018-09-14
Payer: COMMERCIAL

## 2018-09-14 DIAGNOSIS — I10 HYPERTENSION, UNSPECIFIED TYPE: ICD-10-CM

## 2018-09-18 ENCOUNTER — OFFICE VISIT (OUTPATIENT)
Dept: PHYSICAL THERAPY | Facility: CLINIC | Age: 58
End: 2018-09-18
Payer: COMMERCIAL

## 2018-09-18 DIAGNOSIS — R26.81 GAIT INSTABILITY: Primary | ICD-10-CM

## 2018-09-18 PROCEDURE — 97110 THERAPEUTIC EXERCISES: CPT | Performed by: PHYSICAL THERAPIST

## 2018-09-18 PROCEDURE — 97112 NEUROMUSCULAR REEDUCATION: CPT | Performed by: PHYSICAL THERAPIST

## 2018-09-19 RX ORDER — SPIRONOLACTONE 25 MG/1
TABLET ORAL
Qty: 90 TABLET | Refills: 0 | Status: SHIPPED | OUTPATIENT
Start: 2018-09-19 | End: 2018-10-16 | Stop reason: SDUPTHER

## 2018-09-20 ENCOUNTER — OFFICE VISIT (OUTPATIENT)
Dept: PHYSICAL THERAPY | Facility: CLINIC | Age: 58
End: 2018-09-20
Payer: COMMERCIAL

## 2018-09-20 VITALS — HEART RATE: 88 BPM | DIASTOLIC BLOOD PRESSURE: 82 MMHG | SYSTOLIC BLOOD PRESSURE: 158 MMHG

## 2018-09-20 DIAGNOSIS — R26.81 GAIT INSTABILITY: Primary | ICD-10-CM

## 2018-09-20 PROCEDURE — 97150 GROUP THERAPEUTIC PROCEDURES: CPT | Performed by: PHYSICAL THERAPIST

## 2018-09-20 PROCEDURE — 97112 NEUROMUSCULAR REEDUCATION: CPT | Performed by: PHYSICAL THERAPIST

## 2018-09-20 NOTE — PROGRESS NOTES
Daily Note     Today's date: 2018  Patient name: Kb Brito  : 1960  MRN: [id number]  Referring provider: Danilo Robertson MD  Dx:   Encounter Diagnosis     ICD-10-CM    1  Gait instability R26 81                   Subjective: Patient comes to therapy denying pain, soreness after last session, or adverse symptoms of lightheadedness  Objective: See treatment diary below  Pre-workout: /82 mmHg, HR 88 bpm, SaO2 95%  Post-workout /78 mmHg    Assessment:  Tolerated program well and without complaints  Improved performance noted on tandem balance activities  Good challenge with TB exercises and walking with head turns today  Required only 1 rest break today, due to LE fatigue  Plan: Per plan of care with emphasis on proximal hip strength and balance, as tolerated by patient       Precautions: Asthma, visual impairment, DM, peripheral neuropathy     Daily Treatment Diary      Manual                        [facility]                                                                                                                           Exercise Diary                    Bike  8'  np  5'                 Standing head turns --> walking    @ bar 6x  @ bar 8x                 Tandem stance  2x 30" R,L 30"x2 ea  30"x2 ea                 SLS  4 x 10" R,L  20"x2 ea  30"x2 ea                 Side stepping  @ bar 8x  @ bar 8x  @ bar 10x                 HR/TR 30x  30x  30x                 R ankle TB 4 way    np  np                 Step up/down    10x ea 1R  10x ea 1R                 Standing march    20x  20x                 Standing hip abd/ext  20x R,L PTB  20x ea PTB  20x ea OTB                  Mini squats     20x                                                                                                                                                                                                                 Gait training                             Modalities

## 2018-09-25 ENCOUNTER — OFFICE VISIT (OUTPATIENT)
Dept: PHYSICAL THERAPY | Facility: CLINIC | Age: 58
End: 2018-09-25
Payer: COMMERCIAL

## 2018-09-25 DIAGNOSIS — R26.81 GAIT INSTABILITY: Primary | ICD-10-CM

## 2018-09-25 PROCEDURE — 97150 GROUP THERAPEUTIC PROCEDURES: CPT

## 2018-09-25 PROCEDURE — 97112 NEUROMUSCULAR REEDUCATION: CPT

## 2018-09-25 NOTE — PROGRESS NOTES
Daily Note     Today's date: 2018  Patient name: Abel Linn  : 1960  MRN: [id number]  Referring provider: Bennie Ruiz MD  Dx:   Encounter Diagnosis     ICD-10-CM    1  Gait instability R26 81                   Subjective: Patient comes to therapy denying pain, soreness after last session, or adverse symptoms of lightheadedness  Objective: See treatment diary below  Pre-workout: /82 mmHg, HR 88 bpm, SaO2 95%  Post-workout /78 mmHg    Assessment:  Tolerated program well and without complaints  Improved performance noted on tandem balance activities  Good challenge with TB exercises and walking with head turns today  Required only 1 rest break today, due to LE fatigue  Plan: Per plan of care with emphasis on proximal hip strength and balance, as tolerated by patient       Precautions: Asthma, visual impairment, DM, peripheral neuropathy     Daily Treatment Diary      Manual                        [facility]                                                                                                                           Exercise Diary                    Bike  8'  np  5'                 Standing head turns --> walking    @ bar 6x  @ bar 8x                 Tandem stance  2x 30" R,L 30"x2 ea  30"x2 ea                 SLS  4 x 10" R,L  20"x2 ea  30"x2 ea                 Side stepping  @ bar 8x  @ bar 8x  @ bar 10x                 HR/TR 30x  30x  30x                 R ankle TB 4 way    np  np                 Step up/down    10x ea 1R  10x ea 1R                 Standing march    20x  20x                 Standing hip abd/ext  20x R,L PTB  20x ea PTB  20x ea OTB                  Mini squats     20x                                                                                                                                                                                                                 Gait training                             Modalities                                                                                                  Daily Note     Today's date: 2018  Patient name: Melida Kenyon  : 1960  MRN: [id number]  Referring provider: Sandra Castro MD  Dx:   Encounter Diagnosis     ICD-10-CM    1  Gait instability R26 81                   Subjective: Patient currently denied pain as well as soreness after last session, or adverse symptoms of lightheadedness  Objective: See treatment diary below  Pre-workout: /98 mmHg, HR 84 bpm, SaO2 98%  Post-workout /92 mmHg, HR 86 bpm, SaO2 98%    Assessment:  Tolerated program well and without complaints  Improved performance noted on tandem balance activities  Challenged with balance exercises today, particularly walking with head turns  She required two resting periods between exercises today  Plan: Per plan of care with emphasis on proximal hip strength and balance, as tolerated by patient       Precautions: Asthma, visual impairment, DM, peripheral neuropathy     Daily Treatment Diary      Manual                  [facility]      TE                                                                                                                     Exercise Diary                  Bike  8'  np  5'  nv               Standing head turns --> walking    @ bar 6x  @ bar 8x  @ bar  2 laps               Tandem stance  2x 30" R,L 30"x2 ea  30"x2 ea  30"x2               SLS  4 x 10" R,L  20"x2 ea  30"x2 ea  30"x2               Side stepping  @ bar 8x  @ bar 8x  @ bar 10x [email or web address] 2 laps               HR/TR 30x  30x  30x  30x               R ankle TB 4 way    np  np  np               Step up/down    10x ea 1R  10x ea 1R  10 ea 1R               Standing march    20x  20x  20x               Standing hip abd/ext  20x R,L PTB  20x ea PTB  20x ea OTB  20 ea tb nv                Mini squats     20x  x20                                                                                                                                                                                                               Gait training                             Modalities

## 2018-09-27 ENCOUNTER — OFFICE VISIT (OUTPATIENT)
Dept: PHYSICAL THERAPY | Facility: CLINIC | Age: 58
End: 2018-09-27
Payer: COMMERCIAL

## 2018-09-27 DIAGNOSIS — R26.81 GAIT INSTABILITY: Primary | ICD-10-CM

## 2018-09-27 PROCEDURE — 97110 THERAPEUTIC EXERCISES: CPT

## 2018-09-27 PROCEDURE — 97150 GROUP THERAPEUTIC PROCEDURES: CPT

## 2018-09-27 NOTE — PROGRESS NOTES
Daily Note     Today's date: 2018  Patient name: Sarah Beth Max  : 1960  MRN: [id number]  Referring provider: Yoel Ortiz MD  Dx:   Encounter Diagnosis     ICD-10-CM    1  Gait instability R26 81                   Subjective: Patient comes to therapy denying pain, soreness after last session, or adverse symptoms of lightheadedness  Objective: See treatment diary below  Pre-workout: /82 mmHg, HR 88 bpm, SaO2 95%  Post-workout /78 mmHg    Assessment:  Tolerated program well and without complaints  Improved performance noted on tandem balance activities  Good challenge with TB exercises and walking with head turns today  Required only 1 rest break today, due to LE fatigue  Plan: Per plan of care with emphasis on proximal hip strength and balance, as tolerated by patient       Precautions: Asthma, visual impairment, DM, peripheral neuropathy     Daily Treatment Diary      Manual                        [facility]                                                                                                                           Exercise Diary                    Bike  8'  np  5'                 Standing head turns --> walking    @ bar 6x  @ bar 8x                 Tandem stance  2x 30" R,L 30"x2 ea  30"x2 ea                 SLS  4 x 10" R,L  20"x2 ea  30"x2 ea                 Side stepping  @ bar 8x  @ bar 8x  @ bar 10x                 HR/TR 30x  30x  30x                 R ankle TB 4 way    np  np                 Step up/down    10x ea 1R  10x ea 1R                 Standing march    20x  20x                 Standing hip abd/ext  20x R,L PTB  20x ea PTB  20x ea OTB                  Mini squats     20x                                                                                                                                                                                                                 Gait training                             Modalities                                                                                                  Daily Note     Today's date: 2018  Patient name: bK Brito  : 1960  MRN: [id number]  Referring provider: Danilo Robertson MD  Dx:   Encounter Diagnosis     ICD-10-CM    1  Gait instability R26 81                   Subjective: Patient currently c/o continued lightheadedness and blurred vision which she attributes to ocular implants  She noted increased blurry vision for unknown reasons  She noted feeling good after last session and denied any adverse reactions  Objective: See treatment diary below  Pre-workout: /70 mmHg, HR 90 bpm, SaO2 95%  Post-workout /74 mmHg, HR 95 bpm, SaO2 97%    Assessment:  Good tolerance with exercises; improved balance/control with balance exercises  Decreased resting periods needed today  Timed exercises today due to continued incorrect dosage with all exercises last visit; responded well  Plan: Per plan of care with emphasis on proximal hip strength and balance, as tolerated by patient       Precautions: Asthma, visual impairment, DM, peripheral neuropathy     Daily Treatment Diary      Manual                [facility]      TE  TE                                                                                                                   Exercise Diary                Bike  8'  np  5'  nv               Standing head turns --> walking    @ bar 6x  @ bar 8x  @ bar  2 laps  3 laps             Tandem stance  2x 30" R,L 30"x2 ea  30"x2 ea  30"x2  30"x2             SLS  4 x 10" R,L  20"x2 ea  30"x2 ea  30"x2  30"x2 ea             Side stepping  @ bar 8x  @ bar 8x  @ bar 10x [email or web address] 2 laps  3 laps             HR/TR 30x  30x  30x  30x  30 ea             R ankle TB 4 way    np  np  np               Step up/down    10x ea 1R  10x ea 1R  10 ea 1R  1R x 1 min             Standing march    20x  20x  20x  2# x1 min             Standing hip abd/ext  20x R,L PTB  20x ea PTB  20x ea OTB  20 ea tb nv  2# x1 min              Mini squats     20x  x20  1 min                                                                                                                                                                                                             Gait training                             Modalities

## 2018-10-02 ENCOUNTER — OFFICE VISIT (OUTPATIENT)
Dept: PHYSICAL THERAPY | Facility: CLINIC | Age: 58
End: 2018-10-02
Payer: COMMERCIAL

## 2018-10-02 DIAGNOSIS — R26.81 GAIT INSTABILITY: Primary | ICD-10-CM

## 2018-10-02 PROCEDURE — 97110 THERAPEUTIC EXERCISES: CPT | Performed by: PHYSICAL THERAPIST

## 2018-10-02 PROCEDURE — 97112 NEUROMUSCULAR REEDUCATION: CPT | Performed by: PHYSICAL THERAPIST

## 2018-10-02 NOTE — PROGRESS NOTES
Daily Note     Today's date: 10/2/2018  Patient name: Riley Jurado  : 1960  MRN: [id number]  Referring provider: Mayrcarmen Wharton MD  Dx:   No diagnosis found  Subjective: Patient comes to therapy denying pain, soreness after last session, or adverse symptoms of lightheadedness  Objective: See treatment diary below  Pre-workout: /82 mmHg, HR 88 bpm, SaO2 95%  Post-workout /78 mmHg    Assessment:  Tolerated program well and without complaints  Improved performance noted on tandem balance activities  Good challenge with TB exercises and walking with head turns today  Required only 1 rest break today, due to LE fatigue  Plan: Per plan of care with emphasis on proximal hip strength and balance, as tolerated by patient       Precautions: Asthma, visual impairment, DM, peripheral neuropathy     Daily Treatment Diary      Manual                        [facility]                                                                                                                           Exercise Diary                    Bike  8'  np  5'                 Standing head turns --> walking    @ bar 6x  @ bar 8x                 Tandem stance  2x 30" R,L 30"x2 ea  30"x2 ea                 SLS  4 x 10" R,L  20"x2 ea  30"x2 ea                 Side stepping  @ bar 8x  @ bar 8x  @ bar 10x                 HR/TR 30x  30x  30x                 R ankle TB 4 way    np  np                 Step up/down    10x ea 1R  10x ea 1R                 Standing march    20x  20x                 Standing hip abd/ext  20x R,L PTB  20x ea PTB  20x ea OTB                  Mini squats     20x                                                                                                                                                                                                                 Gait training                             Modalities                                                                                                  Daily Note     Today's date: 10/2/2018  Patient name: Shelby Yanez  : 1960  MRN: [id number]  Referring provider: Joan Staton MD  Dx:   No diagnosis found  Subjective: Patient comes to therapy denying changes since last session  Reports continued lightheadedness  States she tried walking without cane recently  Objective: See treatment diary below  Pre-workout: BP 97327 mmHg  Post-workout /76 mmHg    Assessment:  Good tolerance with exercises; improved balance/control with balance exercises  Continues to require resting periods, however, did not need to sit to rest     Plan: Per plan of care with emphasis on proximal hip strength and balance, as tolerated by patient       Precautions: Asthma, visual impairment, DM, peripheral neuropathy     Daily Treatment Diary      Manual   9/12      9/25  9/27  10/2          [facility]      TE  TE  [facility]                                                                                                                 Exercise Diary   9/12  9/18  9/20  9/25  9/27  10/2           Bike  8'  np  5'  nv    5'           Standing head turns --> walking    @ bar 6x  @ bar 8x  @ bar  2 laps  3 laps  3 laps           Tandem stance  2x 30" R,L 30"x2 ea  30"x2 ea  30"x2  30"x2 30"x2 ea           SLS  4 x 10" R,L  20"x2 ea  30"x2 ea  30"x2  30"x2 ea  30"x2 ea           Side stepping  @ bar 8x  @ bar 8x  @ bar 10x [email or web address] 2 laps  3 laps  5 laps           HR/TR 30x  30x  30x  30x  30 ea  30 ea           R ankle TB 4 way    np  np  np               Step up/down    10x ea 1R  10x ea 1R  10 ea 1R  1R x 1 min 2R 1 min           Standing march    20x  20x  20x  2# x1 min  2# x1 min           Standing hip abd/ext  20x R,L PTB  20x ea PTB  20x ea OTB  20 ea tb nv  2# x1 min   2# x1 min            Mini squats     20x  x20  1 min  20x                                                                                                                                                                                                           Gait training                             Modalities

## 2018-10-04 ENCOUNTER — OFFICE VISIT (OUTPATIENT)
Dept: PHYSICAL THERAPY | Facility: CLINIC | Age: 58
End: 2018-10-04
Payer: COMMERCIAL

## 2018-10-04 DIAGNOSIS — R26.81 GAIT INSTABILITY: Primary | ICD-10-CM

## 2018-10-04 PROCEDURE — 97112 NEUROMUSCULAR REEDUCATION: CPT

## 2018-10-04 PROCEDURE — 97150 GROUP THERAPEUTIC PROCEDURES: CPT

## 2018-10-04 NOTE — PROGRESS NOTES
Daily Note     Today's date: 10/4/2018  Patient name: Ann-Marie Martins  : 1960  MRN: [id number]  Referring provider: Ramirez Canchola MD  Dx:   Encounter Diagnosis     ICD-10-CM    1  Gait instability R26 81                   Subjective: Pt reports with c/o fatigue prior to beginning which she attributes to walking for 1 5 miles  She noted great amount of fatigue following walk yesterday and needed to rest the remainder of day  She denied any adverse reactions after last visit, but noted feeling tired and felt reduction in blood sugar due to level of fatigue; had meal immediately following therapy 2* this        Objective: See treatment diary below  Precautions: Asthma, visual impairment, DM, peripheral neuropathy    BP pre-tx: 142/82 mmHg  BP post-tx: 150/70 mmHg     Daily Treatment Diary      Manual   9/12      9/25  9/27  10/2 10/4         [facility]  [facility]      TE  TE  [facility]  TE                                                                                                               Exercise Diary   9/12  9/18  9/20  9/25  9/27  10/2  10/4         Bike  8'  np  5'  nv    5'  5'         Standing head turns --> walking    @ bar 6x  @ bar 8x  @ bar  2 laps  3 laps  3 laps  3 laps         Tandem stance  2x 30" R,L 30"x2 ea  30"x2 ea  30"x2  30"x2 30"x2 ea  30"x2 ea         SLS  4 x 10" R,L  20"x2 ea  30"x2 ea  30"x2  30"x2 ea  30"x2 ea  30"x2 ea         Side stepping  @ bar 8x  @ bar 8x  @ bar 10x [email or web address] 2 laps  3 laps  5 laps  5 laps         HR/TR 30x  30x  30x  30x  30 ea  30 ea  30 ea         R ankle TB 4 way    np  np  np               Step up/down    10x ea 1R  10x ea 1R  10 ea 1R  1R x 1 min 2R 1 min  2R 1 min         Standing march    20x  20x  20x  2# x1 min  2# x1 min  2# 1min         Standing hip abd/ext  20x R,L PTB  20x ea PTB  20x ea OTB  20 ea tb nv  2# x1 min   2# x1 min  2# x1 min          Mini squats     20x  x20  1 min  20x  1 min                                                                                                                                                                                                         Gait training                             Modalities                                                                                                    Assessment: Tolerated treatment well  Minimal resting periods required throughout treatment  Patient demonstrated fatigue post treatment, exhibited good technique with therapeutic exercises and would benefit from continued PT  Plan: Continue per plan of care

## 2018-10-09 ENCOUNTER — APPOINTMENT (OUTPATIENT)
Dept: PHYSICAL THERAPY | Facility: CLINIC | Age: 58
End: 2018-10-09
Payer: COMMERCIAL

## 2018-10-11 ENCOUNTER — OFFICE VISIT (OUTPATIENT)
Dept: PHYSICAL THERAPY | Facility: CLINIC | Age: 58
End: 2018-10-11
Payer: COMMERCIAL

## 2018-10-11 DIAGNOSIS — R26.81 GAIT INSTABILITY: Primary | ICD-10-CM

## 2018-10-11 PROCEDURE — 97110 THERAPEUTIC EXERCISES: CPT

## 2018-10-11 PROCEDURE — 97112 NEUROMUSCULAR REEDUCATION: CPT

## 2018-10-11 NOTE — PROGRESS NOTES
Daily Note     Today's date: 10/11/2018  Patient name: Dylan Van  : 1960  MRN: [id number]  Referring provider: Moni Aponte MD  Dx:   Encounter Diagnosis     ICD-10-CM    1  Gait instability R26 81                   Subjective: Pt reports with c/o headache and lightheadedness, but no different than usual  She noted experiencing great fluctuation in blood sugar within the past few days ranging from   She noted feeling weak and fatigued since Monday, but has not been sleeping well which has changed her usual meal times  She noted checking blood sugar levels daily  She scheduled f/u with PCP tomorrow to discuss         Objective: See treatment diary below  Precautions: Asthma, visual impairment, DM, peripheral neuropathy    BP pre-tx: 154/80 mmHg  BP post-tx: 158/82 mmHg     Daily Treatment Diary      Manual   9/12      9/25  9/27  10/2 10/4  10/11       [facility]  [facility]      TE  TE  [facility]  TE  TE                                                                                                             Exercise Diary   9/12  9/18  9/20  9/25  9/27  10/2  10/4  10/11       Bike  8'  np  5'  nv    5'  5'  nv       Standing head turns --> walking    @ bar 6x  @ bar 8x  @ bar  2 laps  3 laps  3 laps  3 laps nv       Tandem stance  2x 30" R,L 30"x2 ea  30"x2 ea  30"x2  30"x2 30"x2 ea  30"x2 ea  30"x2       SLS  4 x 10" R,L  20"x2 ea  30"x2 ea  30"x2  30"x2 ea  30"x2 ea  30"x2 ea 30"x2       Side stepping  @ bar 8x  @ bar 8x  @ bar 10x [email or web address] 2 laps  3 laps  5 laps  5 laps  nv       HR/TR 30x  30x  30x  30x  30 ea  30 ea  30 ea  30 ea       R ankle TB 4 way    np  np  np               Step up/down    10x ea 1R  10x ea 1R  10 ea 1R  1R x 1 min 2R 1 min  2R 1 min  2R 1 min       Standing march    20x  20x  20x  2# x1 min  2# x1 min  2# 1min  2# 1 min       Standing hip abd/ext  20x R,L PTB  20x ea PTB  20x ea OTB  20 ea tb nv  2# x1 min   2# x1 min  2# x1 min  2# x1 min        Mini squats     20x  x20  1 min  20x  1 min  nv                                                                                                                                                                                                       Gait training                             Modalities                                                                                                    Assessment: Tolerated treatment well  Shortened treatment due to fatigue prior to beginning; resume exercises as able NV  Minimal resting periods required throughout treatment  Patient demonstrated fatigue post treatment, exhibited good technique with therapeutic exercises and would benefit from continued PT  Plan: Continue per plan of care

## 2018-10-12 ENCOUNTER — OFFICE VISIT (OUTPATIENT)
Dept: INTERNAL MEDICINE CLINIC | Facility: CLINIC | Age: 58
End: 2018-10-12
Payer: COMMERCIAL

## 2018-10-12 VITALS
HEART RATE: 92 BPM | SYSTOLIC BLOOD PRESSURE: 156 MMHG | RESPIRATION RATE: 16 BRPM | BODY MASS INDEX: 43.4 KG/M2 | TEMPERATURE: 98.1 F | DIASTOLIC BLOOD PRESSURE: 90 MMHG | WEIGHT: 293 LBS | HEIGHT: 69 IN

## 2018-10-12 DIAGNOSIS — I95.1 ORTHOSTATIC HYPOTENSION: ICD-10-CM

## 2018-10-12 DIAGNOSIS — IMO0002 UNCONTROLLED TYPE 2 DIABETES MELLITUS WITH OPHTHALMIC COMPLICATION, WITH LONG-TERM CURRENT USE OF INSULIN: Primary | ICD-10-CM

## 2018-10-12 DIAGNOSIS — R80.9 MICROALBUMINURIA: ICD-10-CM

## 2018-10-12 DIAGNOSIS — E11.42 DIABETIC POLYNEUROPATHY ASSOCIATED WITH TYPE 2 DIABETES MELLITUS (HCC): ICD-10-CM

## 2018-10-12 DIAGNOSIS — Z23 NEED FOR INFLUENZA VACCINATION: ICD-10-CM

## 2018-10-12 DIAGNOSIS — I10 ESSENTIAL HYPERTENSION: ICD-10-CM

## 2018-10-12 DIAGNOSIS — I28.8 ENLARGED PULMONARY ARTERY (HCC): ICD-10-CM

## 2018-10-12 PROCEDURE — 99214 OFFICE O/P EST MOD 30 MIN: CPT | Performed by: INTERNAL MEDICINE

## 2018-10-12 PROCEDURE — 90682 RIV4 VACC RECOMBINANT DNA IM: CPT | Performed by: INTERNAL MEDICINE

## 2018-10-12 PROCEDURE — G0008 ADMIN INFLUENZA VIRUS VAC: HCPCS | Performed by: INTERNAL MEDICINE

## 2018-10-12 RX ORDER — GABAPENTIN 100 MG/1
100 CAPSULE ORAL 2 TIMES DAILY
Qty: 180 CAPSULE | Refills: 0 | Status: SHIPPED | OUTPATIENT
Start: 2018-10-12 | End: 2019-01-14 | Stop reason: SDUPTHER

## 2018-10-12 RX ORDER — INSULIN GLARGINE 100 [IU]/ML
43 INJECTION, SOLUTION SUBCUTANEOUS
Refills: 5 | COMMUNITY
Start: 2018-10-09 | End: 2019-01-10 | Stop reason: SDUPTHER

## 2018-10-12 NOTE — ASSESSMENT & PLAN NOTE
Lab Results   Component Value Date    HGBA1C 11 1 (H) 06/25/2018       No results for input(s): POCGLU in the last 72 hours  Blood Sugar Average: Last 72 hrs:   advised to recheck, avoid simple carbs, continue current regimen  Monitor regularly  Advised to call her ophthalmologist for a repeat checkup she does have diabetic retinopathy

## 2018-10-12 NOTE — ASSESSMENT & PLAN NOTE
Not completely well controlled but continued orthostatic symptoms    Will not change medication regimen at this time

## 2018-10-12 NOTE — ASSESSMENT & PLAN NOTE
Advised to see Cardiology, get the repeat sleep study for CPAP titration, advised to get Symbicort every day, rescue inhaler at nighttime before going to bed and see if it improves her sleep

## 2018-10-12 NOTE — PROGRESS NOTES
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, with long-term current use of insulin (HCC)  Lab Results   Component Value Date    HGBA1C 11 1 (H) 06/25/2018       No results for input(s): POCGLU in the last 72 hours  Blood Sugar Average: Last 72 hrs:   advised to recheck, avoid simple carbs, continue current regimen  Monitor regularly  Advised to call her ophthalmologist for a repeat checkup she does have diabetic retinopathy  Enlarged pulmonary artery ([address] )  Advised to see Cardiology, get the repeat sleep study for CPAP titration, advised to get Symbicort every day, rescue inhaler at nighttime before going to bed and see if it improves her sleep  Hypertension  Not completely well controlled but continued orthostatic symptoms  Will not change medication regimen at this time    Influenza vaccination was given today  Diagnoses and all orders for this visit:    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, with long-term current use of insulin (HCC)    Enlarged pulmonary artery (HCC)    Orthostatic hypotension    Essential hypertension    Microalbuminuria    Diabetic polyneuropathy associated with type 2 diabetes mellitus (HCC)  -     gabapentin (NEURONTIN) 100 mg capsule; Take 1 capsule (100 mg total) by mouth 2 (two) times a day    Need for influenza vaccination  -     influenza vaccine, [id number], quadrivalent, recombinant, PF, 0 5 mL, for patients 18 yr+ (FLUBLOK)    Other orders  -     BASAGLAR KWIKPEN 100 units/mL injection pen; 43 Units          Subjective:   Chief Complaint   Patient presents with    Follow-up     2 month        Patient ID: Riley Jurado is a 59 y o  female  She comes in for follow-up of hypertension, hyperlipidemia, pulmonary hypertension, diabetes, chronic dyspnea    Continues to feel dizzy when stands up  Undergoing physical therapy for gait stability    Has noticed worsening of vision in the last several weeks   She blood sugars are somewhat better  Most fastings are around 140-170  Had a hypoglycemic event last week  Trying to eat healthy but not sure if she is following all the recommended  Breathing is somewhat stable  Does notice that feels wheezing at nighttime and wakes up due to shortness of breath  Taking Symbicort only as needed  The following portions of the patient's history were reviewed and updated as appropriate: current medications, past medical history, past social history and past surgical history      PHQ-9 Depression Screening    PHQ-9:    Frequency of the following problems over the past two weeks:                Current Outpatient Prescriptions:     albuterol (PROVENTIL HFA) 90 mcg/act inhaler, Inhale 2 puffs every 6 (six) hours as needed for wheezing For another 24 hours use every 4 hours standing, Disp: 6 7 g, Rfl: 0    amLODIPine (NORVASC) 10 mg tablet, Take 1 tablet by mouth daily, Disp: , Rfl:     aspirin 81 MG tablet, Take 1 tablet by mouth daily, Disp: , Rfl:     atorvastatin (LIPITOR) 40 mg tablet, Take 1 tablet (40 mg total) by mouth daily, Disp: 90 tablet, Rfl: 0    BASAGLAR KWIKPEN 100 units/mL injection pen, 43 Units, Disp: , Rfl: 5    SARAH MICROLET LANCETS lancets, Inject 1 applicator into the skin 4 (four) times a day, Disp: , Rfl:     Blood Pressure Monitor KIT, Check blood pressures BID, Disp: 1 each, Rfl: 0    budesonide-formoterol (SYMBICORT) 80-4 5 MCG/ACT inhaler, Inhale 2 puffs 2 (two) times a day Rinse mouth after use , Disp: 1 Inhaler, Rfl: 0    Cholecalciferol (VITAMIN D3) 3000 units TABS, Take by mouth, Disp: , Rfl:     CONTOUR NEXT TEST test strip, 4 (four) times a day Test blood sugar, Disp: , Rfl: 1    gabapentin (NEURONTIN) 100 mg capsule, Take 1 capsule (100 mg total) by mouth 2 (two) times a day, Disp: 180 capsule, Rfl: 0    insulin lispro (HUMALOG KWIKPEN) 100 units/mL injection pen, Inject 10-18 Units under the skin 3 (three) times a day with meals 18 before breakfast 15 before lunch 20 before dinner plus sliding scale, Disp: 5 pen, Rfl: 0    Insulin Pen Needle (BD PEN NEEDLE DERRICK U/F) 32G X 4 MM MISC, Inject 1 applicator under the skin 4 (four) times a day, Disp: , Rfl:     labetalol (NORMODYNE) 200 mg tablet, Take 200 mg by mouth 2 (two) times a day, Disp: , Rfl:     metFORMIN (GLUCOPHAGE) 500 mg tablet, Take 2 tablets (1,000 mg total) by mouth daily with dinner, Disp: 180 tablet, Rfl: 0    spironolactone (ALDACTONE) 25 mg tablet, Take 1 tablet (25 mg total) by mouth daily for 90 days, Disp: 90 tablet, Rfl: 0    spironolactone (ALDACTONE) 25 mg tablet, TAKE 1 TABLET BY MOUTH EVERY DAY, Disp: 90 tablet, Rfl: 0    valsartan-hydrochlorothiazide (DIOVAN-HCT) 160-12 5 MG per tablet, Take 1 tablet by mouth daily, Disp: 30 tablet, Rfl: 2    Review of Systems      Objective:  /90 (BP Location: Left arm, Patient Position: Sitting, Cuff Size: Large)   Pulse 92   Temp 98 1 °F (36 7 °C)   Resp 16   Ht 5' 8 5" (1 74 m)   Wt (!) 141 kg (311 lb 12 8 oz)   BMI 46 72 kg/m²      Physical Exam      No results found for this or any previous visit (from the past 1008 hour(s))  ]    No results found

## 2018-10-16 ENCOUNTER — OFFICE VISIT (OUTPATIENT)
Dept: ENDOCRINOLOGY | Facility: CLINIC | Age: 58
End: 2018-10-16
Payer: COMMERCIAL

## 2018-10-16 VITALS
DIASTOLIC BLOOD PRESSURE: 78 MMHG | HEIGHT: 69 IN | WEIGHT: 293 LBS | HEART RATE: 86 BPM | BODY MASS INDEX: 43.4 KG/M2 | SYSTOLIC BLOOD PRESSURE: 140 MMHG

## 2018-10-16 DIAGNOSIS — I10 ESSENTIAL HYPERTENSION: ICD-10-CM

## 2018-10-16 DIAGNOSIS — E78.5 DYSLIPIDEMIA: ICD-10-CM

## 2018-10-16 DIAGNOSIS — IMO0002 UNCONTROLLED TYPE 2 DIABETES MELLITUS WITH OPHTHALMIC COMPLICATION, WITH LONG-TERM CURRENT USE OF INSULIN: Primary | ICD-10-CM

## 2018-10-16 DIAGNOSIS — R80.9 MICROALBUMINURIA: ICD-10-CM

## 2018-10-16 DIAGNOSIS — E78.5 HYPERLIPIDEMIA, UNSPECIFIED HYPERLIPIDEMIA TYPE: ICD-10-CM

## 2018-10-16 DIAGNOSIS — E16.2 HYPOGLYCEMIA: ICD-10-CM

## 2018-10-16 DIAGNOSIS — E55.9 VITAMIN D DEFICIENCY: ICD-10-CM

## 2018-10-16 PROCEDURE — 99214 OFFICE O/P EST MOD 30 MIN: CPT | Performed by: PHYSICIAN ASSISTANT

## 2018-10-16 NOTE — PROGRESS NOTES
Established Patient Progress Note      Chief Complaint   Patient presents with    Diabetes Type 2          History of Present Illness:   Mahesh Garcia is a 59 y o  female with a history of type 2 diabetes with long term use of insulin  Reports complications of proteinuria and retinopathy  Since last visit, was in hospital in August for difficulty breathing  Denies recent severe hypoglycemic or severe hyperglycemic episodes  Denies any issues with her current regimen  home glucose monitoring: are performed regularly 1-2x per day on average  Blood sugars have been improving  She reports frequently missing insulin at work when busy  Her meals/meal schedule are erratic  Has trouble with balance, using cane, getting PT, thinks is vision related  Home blood glucose readings:   Before breakfast: 137-223  Before lunch: 111,255  Before dinner: 177,266  Bedtime: not usually checking    Current regimen:   Basaglar 43 units at bedtime  Humalog 18 units before meals  Metformin 1000mg daily  When working, busy on phones, often misses humalog    Last Eye Exam: vision getting worse, she was seen in july  Last Foot Exam: Needs to see podiatry  Wont allow foot exam today says feet are very dry, hasn't applied lotion today  Has hypertension: Taking Valsartan HCTZ, aldactone 25mg daily, labetalol 200mg BID, Amlodipine 10mg daily  Has hyperlipidemia: Taking atorvastatin    Taking Vitamin D-- she thinks  3,000 units daily         Patient Active Problem List   Diagnosis    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, with long-term current use of insulin ([address] )    Hypertension    Seizures (HCC)    Exertional dyspnea    Enlarged pulmonary artery (HCC)    Aortic dilatation (HCC)    Anemia    Decreased pulses in feet    Diabetes mellitus type 2 with complications, uncontrolled (HCC)    Dyslipidemia    Fatigue    Hyperlipidemia    Hypoglycemia    Microalbuminuria    Mild obstructive sleep apnea    Obesity    Peripheral neuropathy    Prolonged QT interval    Scalp avulsion    Visual impairment in both eyes    Vitamin D deficiency    Lung nodules    Orthostatic hypotension    Mild intermittent asthma with exacerbation      Past Medical History:   Diagnosis Date    Anemia     Arthritis     [facility] mellitus ([facility] )     Dyspnea on exertion     Hyperlipidemia     Hypertension     Legally blind 2010    Mild intermittent asthma with exacerbation 8/4/2018    Miscarriage     x 3    Seizures ([facility] )     Sickle cell trait ([facility] )       Past Surgical History:   Procedure Laterality Date    CATARACT EXTRACTION Bilateral     august and september    EYE SURGERY      HYSTERECTOMY        Family History   Problem Relation Age of Onset    Heart attack Mother     Heart disease Mother     Hypertension Mother     No Known Problems Father     Heart disease Sister     No Known Problems Brother     No Known Problems Son     No Known Problems Daughter     Heart attack Maternal Grandmother     Diabetes Other     Heart attack Other      Social History   Substance Use Topics    Smoking status: Never Smoker    Smokeless tobacco: Never Used    Alcohol use No     Allergies   Allergen Reactions    2,4-D Dimethylamine (Amisol)      Patient unsure why this is here          Current Outpatient Prescriptions:     albuterol (PROVENTIL HFA) 90 mcg/act inhaler, Inhale 2 puffs every 6 (six) hours as needed for wheezing For another 24 hours use every 4 hours standing, Disp: 6 7 g, Rfl: 0    amLODIPine (NORVASC) 10 mg tablet, Take 1 tablet by mouth daily, Disp: , Rfl:     aspirin 81 MG tablet, Take 1 tablet by mouth daily, Disp: , Rfl:     atorvastatin (LIPITOR) 40 mg tablet, Take 1 tablet (40 mg total) by mouth daily, Disp: 90 tablet, Rfl: 0    BASAGLAR KWIKPEN 100 units/mL injection pen, 43 Units, Disp: , Rfl: 5    SARAH MICROLET LANCETS lancets, Inject 1 applicator into the skin 4 (four) times a day, Disp: , Rfl:     Blood Pressure Monitor KIT, Check blood pressures BID, Disp: 1 each, Rfl: 0    budesonide-formoterol (SYMBICORT) 80-4 5 MCG/ACT inhaler, Inhale 2 puffs 2 (two) times a day Rinse mouth after use , Disp: 1 Inhaler, Rfl: 0    Cholecalciferol (VITAMIN D3) 3000 units TABS, Take by mouth, Disp: , Rfl:     CONTOUR NEXT TEST test strip, 4 (four) times a day Test blood sugar, Disp: , Rfl: 1    gabapentin (NEURONTIN) 100 mg capsule, Take 1 capsule (100 mg total) by mouth 2 (two) times a day, Disp: 180 capsule, Rfl: 0    insulin lispro (HUMALOG KWIKPEN) 100 units/mL injection pen, Inject 10-18 Units under the skin 3 (three) times a day with meals 18 before breakfast 15 before lunch 20 before dinner plus sliding scale, Disp: 5 pen, Rfl: 0    Insulin Pen Needle (BD PEN NEEDLE DERRICK U/F) 32G X 4 MM MISC, Inject 1 applicator under the skin 4 (four) times a day, Disp: , Rfl:     labetalol (NORMODYNE) 200 mg tablet, Take 200 mg by mouth 2 (two) times a day, Disp: , Rfl:     metFORMIN (GLUCOPHAGE) 500 mg tablet, Take 2 tablets (1,000 mg total) by mouth daily with dinner, Disp: 180 tablet, Rfl: 0    spironolactone (ALDACTONE) 25 mg tablet, Take 1 tablet (25 mg total) by mouth daily for 90 days, Disp: 90 tablet, Rfl: 0    valsartan-hydrochlorothiazide (DIOVAN-HCT) 160-12 5 MG per tablet, Take 1 tablet by mouth daily, Disp: 30 tablet, Rfl: 2    Review of Systems   Constitutional: Positive for fatigue  Negative for activity change, appetite change, chills, diaphoresis, fever and unexpected weight change  HENT: Negative for trouble swallowing and voice change  Eyes: Positive for visual disturbance  Respiratory: Negative for shortness of breath  Cardiovascular: Negative for chest pain and palpitations  Gastrointestinal: Negative for abdominal pain, constipation and diarrhea  Endocrine: Negative for cold intolerance, heat intolerance, polydipsia, polyphagia and polyuria     Genitourinary: Negative for frequency and menstrual problem  Musculoskeletal: Negative for arthralgias and myalgias  Skin: Negative for rash  Allergic/Immunologic: Negative for food allergies  Neurological: Positive for light-headedness and headaches  Negative for dizziness and tremors  Hematological: Negative for adenopathy  Psychiatric/Behavioral: Negative for sleep disturbance  All other systems reviewed and are negative  Physical Exam:  Body mass index is 47 47 kg/m²  /78   Pulse 86   Ht 5' 8 5" (1 74 m)   Wt (!) 144 kg (316 lb 12 8 oz)   BMI 47 47 kg/m²    Wt Readings from Last 3 Encounters:   10/16/18 (!) 144 kg (316 lb 12 8 oz)   10/12/18 (!) 141 kg (311 lb 12 8 oz)   08/23/18 (!) 139 kg (306 lb 9 6 oz)       Physical Exam   Constitutional: She is oriented to person, place, and time  She appears well-developed and well-nourished  No distress  HENT:   Head: Normocephalic and atraumatic  Eyes: Pupils are equal, round, and reactive to light  Conjunctivae are normal    Neck: Normal range of motion  Neck supple  No thyromegaly present  Cardiovascular: Normal rate, regular rhythm and normal heart sounds  Pulmonary/Chest: Effort normal and breath sounds normal  No respiratory distress  She has no wheezes  She has no rales  Abdominal: Soft  Bowel sounds are normal  She exhibits no distension  There is no tenderness  Musculoskeletal: Normal range of motion  She exhibits no edema  Neurological: She is alert and oriented to person, place, and time  Skin: Skin is warm and dry  Psychiatric: She has a normal mood and affect  Vitals reviewed  Diabetic Foot Exam   Patient did not allow foot exam today       Labs:   Component      Latest Ref Rng & Units 6/5/2017 9/20/2017 6/24/2018   Sodium      137 - 147 mmol/L      SL AMB POTASSIUM      3 6 - 5 0 mmol/L      Chloride      97 - 108 mmol/L      CO2      22 - 30 mmol/L      Anion Gap      5 - 14 mmol/L      BUN      5 - 25 mg/dL Creatinine      0 60 - 1 20 mg/dL      Glucose      70 - 99 mg/dL      Calcium      8 4 - 10 2 mg/dL      eGFR      >60 ml/min/1 73sq m      Cholesterol      50 - 200 mg/dL  140    Triglycerides      <=150 mg/dL  74    HDL      40 - 60 mg/dL  46    LDL Direct      0 - 100 mg/dL  79    EXT Creatinine Urine      mg/dL 155 0     MICROALBUM ,U,RANDOM      0 0 - 20 0 mg/L 339 0 (H)     MICROALBUMIN/CREATININE RATIO      0 - 30 mg/g creatinine 219 (H)     Hemoglobin A1C      4 2 - 6 3 %      EAG      mg/dl      TSH 3RD GENERATON      0 465 - 4 680 uIU/mL   1 680     Component      Latest Ref Rng & Units 6/25/2018 8/5/2018   Sodium      137 - 147 mmol/L  137   SL AMB POTASSIUM      3 6 - 5 0 mmol/L  4 7   Chloride      97 - 108 mmol/L  102   CO2      22 - 30 mmol/L  23   Anion Gap      5 - 14 mmol/L  12   BUN      5 - 25 mg/dL  34 (H)   Creatinine      0 60 - 1 20 mg/dL  0 91   Glucose      70 - 99 mg/dL  443 (H)   Calcium      8 4 - 10 2 mg/dL  10 0   eGFR      >60 ml/min/1 73sq m  80   Cholesterol      50 - 200 mg/dL     Triglycerides      <=150 mg/dL     HDL      40 - 60 mg/dL     LDL Direct      0 - 100 mg/dL     EXT Creatinine Urine      mg/dL     MICROALBUM ,U,RANDOM      0 0 - 20 0 mg/L     MICROALBUMIN/CREATININE RATIO      0 - 30 mg/g creatinine     Hemoglobin A1C      4 2 - 6 3 % 11 1 (H)    EAG      mg/dl 272    TSH 3RD GENERATON      0 465 - 4 680 uIU/mL         Impression & Plan:    Problem List Items Addressed This Visit     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, with long-term current use of insulin ([facility]) - Primary     Poorly controlled/not at goal with A1C of 11 1 in July  Since that time,she feels blood sugars have improved however review of glucometer shows sporadic glucose monitoring and frequent hyperglycemia into the 200s which she reports is due to missing insulin frequently at work  Also reports erratic meals/mealtimes    Suggested either meet with dietician for medical nutritition therapy for consistent carb diet education or for carb counting/flexible insulin training  She finds it hard to stick with regimented meal times/meals so would be interested in flexible insulin training  Referral placed  Discussed ipmortance of compliance with regular insulin dosing and BG monitoring  Discussed option of CGM and she is intersted if her insurance will cover  Medicare is her secondary insurance-- requires proof of BG monitoring 4x per day  Check BG 4x per day and send log in two weeks  When log reviewed will place referral to dexcom rep and make insulin adjustments if needed               Relevant Orders    Hemoglobin A1C    Ambulatory referral to Diabetic Education    Hypertension     BP improved compared to previous visits  Will not make adjustments due to improvement and history of orthostasis  Dyslipidemia     Continue statin  Check Lipid Panel  Hyperlipidemia    Relevant Orders    Comprehensive metabolic panel    Lipid Panel with Direct LDL reflex    Hypoglycemia     No recent hypoglycemia based on review of meter  Microalbuminuria    Vitamin D deficiency     Continue Supplements  Check Vitamin D level  Relevant Orders    Vitamin D 25 hydroxy          Orders Placed This Encounter   Procedures    Hemoglobin A1C     Standing Status:   Future     Standing Expiration Date:   4/16/2019    Comprehensive metabolic panel     This is a patient instruction: Patient fasting for 8 hours or longer recommended  Standing Status:   Future     Standing Expiration Date:   4/16/2019    Lipid Panel with Direct LDL reflex     This is a patient instruction: This test requires patient fasting for 10-12 hours or longer  Drinking of black coffee or black tea is acceptable       Standing Status:   Future     Standing Expiration Date:   4/16/2019    Vitamin D 25 hydroxy     Standing Status:   Future     Standing Expiration Date:   4/16/2019   [facility] Ambulatory referral to Diabetic Education     Standing Status:   Future     Standing Expiration Date:   4/16/2019     Referral Priority:   Routine     Referral Type:   Consult - AMB     Referral Reason:   Specialty Services Required     Requested Specialty:   Endocrinology     Number of Visits Requested:   1     Expiration Date:   10/16/2019       There are no Patient Instructions on file for this visit  Discussed with the patient and all questioned fully answered  She will call me if any problems arise  Follow-up appointment in 3 months       Counseled patient on diagnostic results, prognosis, risk and benefit of treatment options, instruction for management, importance of treatment compliance, Risk  factor reduction and impressions      Jeremiah Jerry PA-C

## 2018-10-16 NOTE — LETTER
October 17, 2018     Agnieszka Newton, [address] [facility]    Patient: Santa Welsh   YOB: 1960   Date of Visit: 10/16/2018       Dear Dr Bustos Ahr:    Thank you for referring Homa Reyes to me for evaluation  Below are my notes for this consultation  If you have questions, please do not hesitate to call me  I look forward to following your patient along with you  Sincerely,        Jen Barrera PA-C        CC: No Recipients  Jen Barrera PA-C  10/17/2018 11:00 AM  Sign at close encounter      Established Patient Progress Note      Chief Complaint   Patient presents with    Diabetes Type 2          History of Present Illness:   Santa Welsh is a 59 y o  female with a history of type 2 diabetes with long term use of insulin  Reports complications of proteinuria and retinopathy  Since last visit, was in hospital in August for difficulty breathing  Denies recent severe hypoglycemic or severe hyperglycemic episodes  Denies any issues with her current regimen  home glucose monitoring: are performed regularly 1-2x per day on average  Blood sugars have been improving  She reports frequently missing insulin at work when busy  Her meals/meal schedule are erratic  Has trouble with balance, using cane, getting PT, thinks is vision related  Home blood glucose readings:   Before breakfast: 137-223  Before lunch: 111,255  Before dinner: 177,266  Bedtime: not usually checking    Current regimen:   Basaglar 43 units at bedtime  Humalog 18 units before meals  Metformin 1000mg daily  When working, busy on phones, often misses humalog    Last Eye Exam: vision getting worse, she was seen in july  Last Foot Exam: Needs to see podiatry  Wont allow foot exam today says feet are very dry, hasn't applied lotion today       Has hypertension: Taking Valsartan HCTZ, aldactone 25mg daily, labetalol 200mg BID, Amlodipine 10mg daily  Has hyperlipidemia: Taking atorvastatin Taking Vitamin D-- she thinks  3,000 units daily         Patient Active Problem List   Diagnosis    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, with long-term current use of insulin ([facility] )    Hypertension    Seizures ([facility] )    Exertional dyspnea    Enlarged pulmonary artery ([facility] )    Aortic dilatation (HCC)    Anemia    Decreased pulses in feet    Diabetes mellitus type 2 with complications, uncontrolled ([facility] )    Dyslipidemia    Fatigue    Hyperlipidemia    Hypoglycemia    Microalbuminuria    Mild obstructive sleep apnea    Obesity    Peripheral neuropathy    Prolonged QT interval    Scalp avulsion    Visual impairment in both eyes    Vitamin D deficiency    Lung nodules    Orthostatic hypotension    Mild intermittent asthma with exacerbation      Past Medical History:   Diagnosis Date    Anemia     Arthritis     Diabetes mellitus ([facility] )     Dyspnea on exertion     Hyperlipidemia     Hypertension     Legally blind 2010    Mild intermittent asthma with exacerbation 8/4/2018    Miscarriage     x 3    Seizures (HCC)     Sickle cell trait ([facility] )       Past Surgical History:   Procedure Laterality Date    CATARACT EXTRACTION Bilateral     august and september    EYE SURGERY      HYSTERECTOMY        Family History   Problem Relation Age of Onset    Heart attack Mother     Heart disease Mother     Hypertension Mother     No Known Problems Father     Heart disease Sister     No Known Problems Brother     No Known Problems Son     No Known Problems Daughter     Heart attack Maternal Grandmother     Diabetes Other     Heart attack Other      Social History   Substance Use Topics    Smoking status: Never Smoker    Smokeless tobacco: Never Used    Alcohol use No     Allergies   Allergen Reactions    2,4-D Dimethylamine (Amisol)      Patient unsure why this is here          Current Outpatient Prescriptions:     albuterol (PROVENTIL HFA) 90 mcg/act inhaler, Inhale 2 puffs every 6 (six) hours as needed for wheezing For another 24 hours use every 4 hours standing, Disp: 6 7 g, Rfl: 0    amLODIPine (NORVASC) 10 mg tablet, Take 1 tablet by mouth daily, Disp: , Rfl:     aspirin 81 MG tablet, Take 1 tablet by mouth daily, Disp: , Rfl:     atorvastatin (LIPITOR) 40 mg tablet, Take 1 tablet (40 mg total) by mouth daily, Disp: 90 tablet, Rfl: 0    BASAGLAR KWIKPEN 100 units/mL injection pen, 43 Units, Disp: , Rfl: 5    SARAH MICROLET LANCETS lancets, Inject 1 applicator into the skin 4 (four) times a day, Disp: , Rfl:     Blood Pressure Monitor KIT, Check blood pressures BID, Disp: 1 each, Rfl: 0    budesonide-formoterol (SYMBICORT) 80-4 5 MCG/ACT inhaler, Inhale 2 puffs 2 (two) times a day Rinse mouth after use , Disp: 1 Inhaler, Rfl: 0    Cholecalciferol (VITAMIN D3) 3000 units TABS, Take by mouth, Disp: , Rfl:     CONTOUR NEXT TEST test strip, 4 (four) times a day Test blood sugar, Disp: , Rfl: 1    gabapentin (NEURONTIN) 100 mg capsule, Take 1 capsule (100 mg total) by mouth 2 (two) times a day, Disp: 180 capsule, Rfl: 0    insulin lispro (HUMALOG KWIKPEN) 100 units/mL injection pen, Inject 10-18 Units under the skin 3 (three) times a day with meals 18 before breakfast 15 before lunch 20 before dinner plus sliding scale, Disp: 5 pen, Rfl: 0    Insulin Pen Needle (BD PEN NEEDLE DERRICK U/F) 32G X 4 MM MISC, Inject 1 applicator under the skin 4 (four) times a day, Disp: , Rfl:     labetalol (NORMODYNE) 200 mg tablet, Take 200 mg by mouth 2 (two) times a day, Disp: , Rfl:     metFORMIN (GLUCOPHAGE) 500 mg tablet, Take 2 tablets (1,000 mg total) by mouth daily with dinner, Disp: 180 tablet, Rfl: 0    spironolactone (ALDACTONE) 25 mg tablet, Take 1 tablet (25 mg total) by mouth daily for 90 days, Disp: 90 tablet, Rfl: 0    valsartan-hydrochlorothiazide (DIOVAN-HCT) 160-12 5 MG per tablet, Take 1 tablet by mouth daily, Disp: 30 tablet, Rfl: 2    Review of Systems   Constitutional: Positive for fatigue  Negative for activity change, appetite change, chills, diaphoresis, fever and unexpected weight change  HENT: Negative for trouble swallowing and voice change  Eyes: Positive for visual disturbance  Respiratory: Negative for shortness of breath  Cardiovascular: Negative for chest pain and palpitations  Gastrointestinal: Negative for abdominal pain, constipation and diarrhea  Endocrine: Negative for cold intolerance, heat intolerance, polydipsia, polyphagia and polyuria  Genitourinary: Negative for frequency and menstrual problem  Musculoskeletal: Negative for arthralgias and myalgias  Skin: Negative for rash  Allergic/Immunologic: Negative for food allergies  Neurological: Positive for light-headedness and headaches  Negative for dizziness and tremors  Hematological: Negative for adenopathy  Psychiatric/Behavioral: Negative for sleep disturbance  All other systems reviewed and are negative  Physical Exam:  Body mass index is 47 47 kg/m²  /78   Pulse 86   Ht 5' 8 5" (1 74 m)   Wt (!) 144 kg (316 lb 12 8 oz)   BMI 47 47 kg/m²     Wt Readings from Last 3 Encounters:   10/16/18 (!) 144 kg (316 lb 12 8 oz)   10/12/18 (!) 141 kg (311 lb 12 8 oz)   08/23/18 (!) 139 kg (306 lb 9 6 oz)       Physical Exam   Constitutional: She is oriented to person, place, and time  She appears well-developed and well-nourished  No distress  HENT:   Head: Normocephalic and atraumatic  Eyes: Pupils are equal, round, and reactive to light  Conjunctivae are normal    Neck: Normal range of motion  Neck supple  No thyromegaly present  Cardiovascular: Normal rate, regular rhythm and normal heart sounds  Pulmonary/Chest: Effort normal and breath sounds normal  No respiratory distress  She has no wheezes  She has no rales  Abdominal: Soft  Bowel sounds are normal  She exhibits no distension  There is no tenderness     Musculoskeletal: Normal range of motion  She exhibits no edema  Neurological: She is alert and oriented to person, place, and time  Skin: Skin is warm and dry  Psychiatric: She has a normal mood and affect  Vitals reviewed  Diabetic Foot Exam   Patient did not allow foot exam today       Labs:   Component      Latest Ref Rng & Units 6/5/2017 9/20/2017 6/24/2018   Sodium      137 - 147 mmol/L      SL AMB POTASSIUM      3 6 - 5 0 mmol/L      Chloride      97 - 108 mmol/L      CO2      22 - 30 mmol/L      Anion Gap      5 - 14 mmol/L      BUN      5 - 25 mg/dL      Creatinine      0 60 - 1 20 mg/dL      Glucose      70 - 99 mg/dL      Calcium      8 4 - 10 2 mg/dL      eGFR      >60 ml/min/1 73sq m      Cholesterol      50 - 200 mg/dL  140    Triglycerides      <=150 mg/dL  74    HDL      40 - 60 mg/dL  46    LDL Direct      0 - 100 mg/dL  79    EXT Creatinine Urine      mg/dL 155 0     MICROALBUM ,U,RANDOM      0 0 - 20 0 mg/L 339 0 (H)     MICROALBUMIN/CREATININE RATIO      0 - 30 mg/g creatinine 219 (H)     Hemoglobin A1C      4 2 - 6 3 %      EAG      mg/dl      TSH 3RD GENERATON      0 465 - 4 680 uIU/mL   1 680     Component      Latest Ref Rng & Units 6/25/2018 8/5/2018   Sodium      137 - 147 mmol/L  137   SL AMB POTASSIUM      3 6 - 5 0 mmol/L  4 7   Chloride      97 - 108 mmol/L  102   CO2      22 - 30 mmol/L  23   Anion Gap      5 - 14 mmol/L  12   BUN      5 - 25 mg/dL  34 (H)   Creatinine      0 60 - 1 20 mg/dL  0 91   Glucose      70 - 99 mg/dL  443 (H)   Calcium      8 4 - 10 2 mg/dL  10 0   eGFR      >60 ml/min/1 73sq m  80   Cholesterol      50 - 200 mg/dL     Triglycerides      <=150 mg/dL     HDL      40 - 60 mg/dL     LDL Direct      0 - 100 mg/dL     EXT Creatinine Urine      mg/dL     MICROALBUM ,U,RANDOM      0 0 - 20 0 mg/L     MICROALBUMIN/CREATININE RATIO      0 - 30 mg/g creatinine     Hemoglobin A1C      4 2 - 6 3 % 11 1 (H)    EAG      mg/dl 272    TSH 3RD GENERATON      0 465 - 4 680 uIU/mL         Impression & Plan:    Problem List Items Addressed This Visit     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, with long-term current use of insulin ([address] ) - Primary     Poorly controlled/not at goal with A1C of 11 1 in July  Since that time,she feels blood sugars have improved however review of glucometer shows sporadic glucose monitoring and frequent hyperglycemia into the 200s which she reports is due to missing insulin frequently at work  Also reports erratic meals/mealtimes  Suggested either meet with dietician for medical nutritition therapy for consistent carb diet education or for carb counting/flexible insulin training  She finds it hard to stick with regimented meal times/meals so would be interested in flexible insulin training  Referral placed  Discussed ipmortance of compliance with regular insulin dosing and BG monitoring  Discussed option of CGM and she is intersted if her insurance will cover  Medicare is her secondary insurance-- requires proof of BG monitoring 4x per day  Check BG 4x per day and send log in two weeks  When log reviewed will place referral to dexcom rep and make insulin adjustments if needed               Relevant Orders    Hemoglobin A1C    Ambulatory referral to Diabetic Education    Hypertension     BP improved compared to previous visits  Will not make adjustments due to improvement and history of orthostasis  Dyslipidemia     Continue statin  Check Lipid Panel  Hyperlipidemia    Relevant Orders    Comprehensive metabolic panel    Lipid Panel with Direct LDL reflex    Hypoglycemia     No recent hypoglycemia based on review of meter  Microalbuminuria    Vitamin D deficiency     Continue Supplements  Check Vitamin D level            Relevant Orders    Vitamin D 25 hydroxy          Orders Placed This Encounter   Procedures    Hemoglobin A1C     Standing Status:   Future     Standing Expiration Date:   4/16/2019    Comprehensive metabolic panel     This is a patient instruction: Patient fasting for 8 hours or longer recommended  Standing Status:   Future     Standing Expiration Date:   4/16/2019    Lipid Panel with Direct LDL reflex     This is a patient instruction: This test requires patient fasting for 10-12 hours or longer  Drinking of black coffee or black tea is acceptable  Standing Status:   Future     Standing Expiration Date:   4/16/2019    Vitamin D 25 hydroxy     Standing Status:   Future     Standing Expiration Date:   4/16/2019    Ambulatory referral to Diabetic Education     Standing Status:   Future     Standing Expiration Date:   4/16/2019     Referral Priority:   Routine     Referral Type:   Consult - AMB     Referral Reason:   Specialty Services Required     Requested Specialty:   Endocrinology     Number of Visits Requested:   1     Expiration Date:   10/16/2019       There are no Patient Instructions on file for this visit  Discussed with the patient and all questioned fully answered  She will call me if any problems arise  Follow-up appointment in 3 months       Counseled patient on diagnostic results, prognosis, risk and benefit of treatment options, instruction for management, importance of treatment compliance, Risk  factor reduction and impressions      Shameka Kaplan PA-C

## 2018-10-16 NOTE — ASSESSMENT & PLAN NOTE
Poorly controlled/not at goal with A1C of 11 1 in July  Since that time,she feels blood sugars have improved however review of glucometer shows sporadic glucose monitoring and frequent hyperglycemia into the 200s which she reports is due to missing insulin frequently at work  Also reports erratic meals/mealtimes  Suggested either meet with dietician for medical nutritition therapy for consistent carb diet education or for carb counting/flexible insulin training  She finds it hard to stick with regimented meal times/meals so would be interested in flexible insulin training  Referral placed  Discussed ipmortance of compliance with regular insulin dosing and BG monitoring  Discussed option of CGM and she is intersted if her insurance will cover  Medicare is her secondary insurance-- requires proof of BG monitoring 4x per day  Check BG 4x per day and send log in two weeks  When log reviewed will place referral to dexcom rep and make insulin adjustments if needed  Lisandro Rome

## 2018-10-17 NOTE — ASSESSMENT & PLAN NOTE
BP improved compared to previous visits  Will not make adjustments due to improvement and history of orthostasis

## 2018-10-18 ENCOUNTER — OFFICE VISIT (OUTPATIENT)
Dept: CARDIOLOGY CLINIC | Facility: CLINIC | Age: 58
End: 2018-10-18
Payer: COMMERCIAL

## 2018-10-18 ENCOUNTER — APPOINTMENT (OUTPATIENT)
Dept: PHYSICAL THERAPY | Facility: CLINIC | Age: 58
End: 2018-10-18
Payer: COMMERCIAL

## 2018-10-18 VITALS
DIASTOLIC BLOOD PRESSURE: 72 MMHG | SYSTOLIC BLOOD PRESSURE: 132 MMHG | WEIGHT: 293 LBS | HEIGHT: 69 IN | HEART RATE: 90 BPM | BODY MASS INDEX: 43.4 KG/M2

## 2018-10-18 DIAGNOSIS — I10 ESSENTIAL HYPERTENSION: Primary | ICD-10-CM

## 2018-10-18 PROCEDURE — 99214 OFFICE O/P EST MOD 30 MIN: CPT | Performed by: INTERNAL MEDICINE

## 2018-10-18 RX ORDER — DILTIAZEM HYDROCHLORIDE 180 MG/1
180 CAPSULE, COATED, EXTENDED RELEASE ORAL DAILY
Qty: 30 CAPSULE | Refills: 5 | Status: SHIPPED | OUTPATIENT
Start: 2018-10-18 | End: 2019-01-28 | Stop reason: SDUPTHER

## 2018-10-18 NOTE — PROGRESS NOTES
[facility]  [address]  [facility], [address] [facility]  [phone]    Cardiology Follow up    Patient:  Wallace Herrera  :  1960  MRN:  [id number]    History of Present Illness:     73-year-old female with past medical history of diabetes with neuropathy, hypertension, aneurysmal ascending aorta measuring up to 4 3 cm, prominent main pulmonary artery at 3 5 cm, recently diagnosed reportedly mild sleep apnea not yet on CPAP, recently diagnosed asthma remote history of seizures, presents for follow-up  She denies any chest pain but does continue to get dyspnea on exertion which she goes up hills  She has not been walking every day  She now works from home as more time to go to her doctor's appointments  She has had a few ER visits and was diagnosed with asthma since I have seen her last     She does note dizziness when she is going up the stairs or when she is walking generally or when she 1st stands up  This feels like her head is very heavy and feels like she is going to follow if she continues  She has not had any syncope  She denies any palpitations          Patient Active Problem List   Diagnosis    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, with long-term current use of insulin ([address] )    Hypertension    Seizures (HCC)    Exertional dyspnea    Enlarged pulmonary artery (HCC)    Aortic dilatation (HCC)    Anemia    Decreased pulses in feet    Diabetes mellitus type 2 with complications, uncontrolled (HCC)    Dyslipidemia    Fatigue    Hyperlipidemia    Hypoglycemia    Microalbuminuria    Mild obstructive sleep apnea    Obesity    Peripheral neuropathy    Prolonged QT interval    Scalp avulsion    Visual impairment in both eyes    Vitamin D deficiency    Lung nodules    Orthostatic hypotension    Mild intermittent asthma with exacerbation       Past Surgical History  Past Surgical History:   Procedure Laterality Date  CATARACT EXTRACTION Bilateral     august and september    EYE SURGERY      HYSTERECTOMY         Social History   Social History     Social History    Marital status: /Civil Union     Spouse name: N/A    Number of children: N/A    Years of education: N/A     Occupational History    employed      Social History Main Topics    Smoking status: Never Smoker    Smokeless tobacco: Never Used    Alcohol use No    Drug use: No    Sexual activity: No     Other Topics Concern    Not on file     Social History Narrative    Caffeine use            Allergies   Allergen Reactions    2,4-D Dimethylamine (Amisol)      Patient unsure why this is here        Family History   Family History   Problem Relation Age of Onset    Heart attack Mother     Heart disease Mother     Hypertension Mother     No Known Problems Father     Heart disease Sister     No Known Problems Brother     No Known Problems Son     No Known Problems Daughter     Heart attack Maternal Grandmother     Diabetes Other     Heart attack Other        Review of Systems:  Review of Systems   Constitutional: Negative for chills, fatigue and fever  HENT: Negative for hearing loss, nosebleeds and tinnitus  Eyes: Negative for pain  Respiratory: Negative for cough, chest tightness and shortness of breath  Cardiovascular: Negative for chest pain, palpitations and leg swelling  Gastrointestinal: Negative for abdominal pain, nausea and vomiting  Endocrine: Negative for cold intolerance and heat intolerance  Genitourinary: Negative for difficulty urinating, hematuria and vaginal bleeding  Musculoskeletal: Negative for arthralgias  Skin: Negative for rash  Allergic/Immunologic: Negative for environmental allergies  Neurological: Positive for dizziness  Negative for syncope, weakness and light-headedness  Psychiatric/Behavioral: Negative for decreased concentration and sleep disturbance  The patient is not nervous/anxious  Current Outpatient Prescriptions:     albuterol (PROVENTIL HFA) 90 mcg/act inhaler, Inhale 2 puffs every 6 (six) hours as needed for wheezing For another 24 hours use every 4 hours standing, Disp: 6 7 g, Rfl: 0    amLODIPine (NORVASC) 10 mg tablet, Take 1 tablet by mouth daily, Disp: , Rfl:     aspirin 81 MG tablet, Take 1 tablet by mouth daily, Disp: , Rfl:     atorvastatin (LIPITOR) 40 mg tablet, Take 1 tablet (40 mg total) by mouth daily, Disp: 90 tablet, Rfl: 0    BASAGLAR KWIKPEN 100 units/mL injection pen, 43 Units, Disp: , Rfl: 5    SARAH MICROLET LANCETS lancets, Inject 1 applicator into the skin 4 (four) times a day, Disp: , Rfl:     Blood Pressure Monitor KIT, Check blood pressures BID, Disp: 1 each, Rfl: 0    budesonide-formoterol (SYMBICORT) 80-4 5 MCG/ACT inhaler, Inhale 2 puffs 2 (two) times a day Rinse mouth after use , Disp: 1 Inhaler, Rfl: 0    Cholecalciferol (VITAMIN D3) 3000 units TABS, Take by mouth, Disp: , Rfl:     CONTOUR NEXT TEST test strip, 4 (four) times a day Test blood sugar, Disp: , Rfl: 1    gabapentin (NEURONTIN) 100 mg capsule, Take 1 capsule (100 mg total) by mouth 2 (two) times a day, Disp: 180 capsule, Rfl: 0    insulin lispro (HUMALOG KWIKPEN) 100 units/mL injection pen, Inject 10-18 Units under the skin 3 (three) times a day with meals 18 before breakfast 15 before lunch 20 before dinner plus sliding scale, Disp: 5 pen, Rfl: 0    Insulin Pen Needle (BD PEN NEEDLE DERRICK U/F) 32G X 4 MM MISC, Inject 1 applicator under the skin 4 (four) times a day, Disp: , Rfl:     labetalol (NORMODYNE) 200 mg tablet, Take 200 mg by mouth 2 (two) times a day, Disp: , Rfl:     metFORMIN (GLUCOPHAGE) 500 mg tablet, Take 2 tablets (1,000 mg total) by mouth daily with dinner, Disp: 180 tablet, Rfl: 0    spironolactone (ALDACTONE) 25 mg tablet, Take 1 tablet (25 mg total) by mouth daily for 90 days, Disp: 90 tablet, Rfl: 0    valsartan-hydrochlorothiazide (DIOVAN-HCT) 160-12 5 MG per tablet, Take 1 tablet by mouth daily (Patient not taking: Reported on 10/18/2018 ), Disp: 30 tablet, Rfl: 2     Physical Exam:    Vitals:    10/18/18 0918   BP: 132/72   BP Location: Right arm   Patient Position: Sitting   Cuff Size: Large   Pulse: 90   Weight: (!) 142 kg (312 lb 9 6 oz)   Height: 5' 8 5" (1 74 m)       Physical Exam   Constitutional: She is oriented to person, place, and time  She appears well-developed and well-nourished  HENT:   Head: Normocephalic  Right Ear: External ear normal    Left Ear: External ear normal    Mouth/Throat: Oropharynx is clear and moist    Eyes: Pupils are equal, round, and reactive to light  Neck: No JVD present  Carotid bruit is not present  Cardiovascular: Normal rate, regular rhythm and intact distal pulses  Exam reveals no gallop and no friction rub  No murmur heard  Pulmonary/Chest: Effort normal and breath sounds normal  No tachypnea  No respiratory distress  She has no wheezes  She has no rales  She exhibits no tenderness  Abdominal: Soft  She exhibits no distension  There is no tenderness  There is no rebound and no guarding  Musculoskeletal: She exhibits no edema  Neurological: She is alert and oriented to person, place, and time  Skin: Skin is warm and dry  Psychiatric: She has a normal mood and affect  Her behavior is normal  Judgment and thought content normal    Nursing note and vitals reviewed  Labs:not applicable    Assessment/Plan:    1  Abnormal stress test-I would like to do a dobutamine stress echocardiogram to see if this is a false positive  2   Hypertension-given the enlargement of her ascending aorta seen on the 2nd most recent CT scan, ideally we would like to continue her on the labetalol  However it is possible the labetalol is making her asthma worse  I have switched her labetalol to Cardizem  3   Dilated pulmonary artery-this is noted      4    Ascending aortic dilatation/aneurysm-she will need follow-up yearly for this  5   Dizziness-this may be multifactorial etiology-I do not think she is having low blood pressure or bradycardia that could be causing this  I would like to see her back in 4-6 weeks for follow-up  Thank you so much, please do not hesitate to contact me if there any questions or concerns        Cheryl Turner MD  10/18/2018  9:21 AM

## 2018-10-18 NOTE — LETTER
2018     James Bergeron, [address] [facility]    Patient: Geetha Garcia   YOB: 1960   Date of Visit: 10/18/2018       Dear Dr Clari Adkins:    Thank you for referring Shauna Castañeda to me for evaluation  Below are my notes for this consultation  If you have questions, please do not hesitate to call me  I look forward to following your patient along with you  Sincerely,        Raisa Godfrey MD        CC: No Recipients  Raisa Godfrey MD  10/18/2018  9:47 AM  Sign at close encounter  [address] Cardiology [facility] AT [facility], [address] [facility]  [phone]    Cardiology Follow up    Patient:  Geetha Garcia  :  1960  MRN:  [id number]    History of Present Illness:     49-year-old female with past medical history of diabetes with neuropathy, hypertension, aneurysmal ascending aorta measuring up to 4 3 cm, prominent main pulmonary artery at 3 5 cm, recently diagnosed reportedly mild sleep apnea not yet on CPAP, recently diagnosed asthma remote history of seizures, presents for follow-up  She denies any chest pain but does continue to get dyspnea on exertion which she goes up hills  She has not been walking every day  She now works from home as more time to go to her doctor's appointments  She has had a few ER visits and was diagnosed with asthma since I have seen her last     She does note dizziness when she is going up the stairs or when she is walking generally or when she 1st stands up  This feels like her head is very heavy and feels like she is going to follow if she continues  She has not had any syncope  She denies any palpitations          Patient Active Problem List   Diagnosis    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, with long-term current use of insulin ([address] )    Hypertension    Seizures ([address] )    Exertional dyspnea    Enlarged pulmonary artery (HCC)    Aortic dilatation ([facility] )    Anemia    Decreased pulses in feet    Diabetes mellitus type 2 with complications, uncontrolled (HCC)    Dyslipidemia    Fatigue    Hyperlipidemia    Hypoglycemia    Microalbuminuria    Mild obstructive sleep apnea    Obesity    Peripheral neuropathy    Prolonged QT interval    Scalp avulsion    Visual impairment in both eyes    Vitamin D deficiency    Lung nodules    Orthostatic hypotension    Mild intermittent asthma with exacerbation       Past Surgical History  Past Surgical History:   Procedure Laterality Date    CATARACT EXTRACTION Bilateral     august and september    EYE SURGERY      HYSTERECTOMY         Social History   Social History     Social History    Marital status: /Civil Union     Spouse name: N/A    Number of children: N/A    Years of education: N/A     Occupational History    employed      Social History Main Topics    Smoking status: Never Smoker    Smokeless tobacco: Never Used    Alcohol use No    Drug use: No    Sexual activity: No     Other Topics Concern    Not on file     Social History Narrative    Caffeine use            Allergies   Allergen Reactions    2,4-D Dimethylamine (Amisol)      Patient unsure why this is here        Family History   Family History   Problem Relation Age of Onset    Heart attack Mother     Heart disease Mother     Hypertension Mother     No Known Problems Father     Heart disease Sister     No Known Problems Brother     No Known Problems Son     No Known Problems Daughter     Heart attack Maternal Grandmother     Diabetes Other     Heart attack Other        Review of Systems:  Review of Systems   Constitutional: Negative for chills, fatigue and fever  HENT: Negative for hearing loss, nosebleeds and tinnitus  Eyes: Negative for pain  Respiratory: Negative for cough, chest tightness and shortness of breath  Cardiovascular: Negative for chest pain, palpitations and leg swelling     Gastrointestinal: Negative for abdominal pain, nausea and vomiting  Endocrine: Negative for cold intolerance and heat intolerance  Genitourinary: Negative for difficulty urinating, hematuria and vaginal bleeding  Musculoskeletal: Negative for arthralgias  Skin: Negative for rash  Allergic/Immunologic: Negative for environmental allergies  Neurological: Positive for dizziness  Negative for syncope, weakness and light-headedness  Psychiatric/Behavioral: Negative for decreased concentration and sleep disturbance  The patient is not nervous/anxious            Current Outpatient Prescriptions:     albuterol (PROVENTIL HFA) 90 mcg/act inhaler, Inhale 2 puffs every 6 (six) hours as needed for wheezing For another 24 hours use every 4 hours standing, Disp: 6 7 g, Rfl: 0    amLODIPine (NORVASC) 10 mg tablet, Take 1 tablet by mouth daily, Disp: , Rfl:     aspirin 81 MG tablet, Take 1 tablet by mouth daily, Disp: , Rfl:     atorvastatin (LIPITOR) 40 mg tablet, Take 1 tablet (40 mg total) by mouth daily, Disp: 90 tablet, Rfl: 0    BASAGLAR KWIKPEN 100 units/mL injection pen, 43 Units, Disp: , Rfl: 5    Gigzolo MICROLET LANCETS lancets, Inject 1 applicator into the skin 4 (four) times a day, Disp: , Rfl:     Blood Pressure Monitor KIT, Check blood pressures BID, Disp: 1 each, Rfl: 0    budesonide-formoterol (SYMBICORT) 80-4 5 MCG/ACT inhaler, Inhale 2 puffs 2 (two) times a day Rinse mouth after use , Disp: 1 Inhaler, Rfl: 0    Cholecalciferol (VITAMIN D3) 3000 units TABS, Take by mouth, Disp: , Rfl:     CONTOUR NEXT TEST test strip, 4 (four) times a day Test blood sugar, Disp: , Rfl: 1    gabapentin (NEURONTIN) 100 mg capsule, Take 1 capsule (100 mg total) by mouth 2 (two) times a day, Disp: 180 capsule, Rfl: 0    insulin lispro (HUMALOG KWIKPEN) 100 units/mL injection pen, Inject 10-18 Units under the skin 3 (three) times a day with meals 18 before breakfast 15 before lunch 20 before dinner plus sliding scale, Disp: 5 pen, Rfl: 0    Insulin Pen Needle (BD PEN NEEDLE DERRICK U/F) 32G X 4 MM MISC, Inject 1 applicator under the skin 4 (four) times a day, Disp: , Rfl:     labetalol (NORMODYNE) 200 mg tablet, Take 200 mg by mouth 2 (two) times a day, Disp: , Rfl:     metFORMIN (GLUCOPHAGE) 500 mg tablet, Take 2 tablets (1,000 mg total) by mouth daily with dinner, Disp: 180 tablet, Rfl: 0    spironolactone (ALDACTONE) 25 mg tablet, Take 1 tablet (25 mg total) by mouth daily for 90 days, Disp: 90 tablet, Rfl: 0    valsartan-hydrochlorothiazide (DIOVAN-HCT) 160-12 5 MG per tablet, Take 1 tablet by mouth daily (Patient not taking: Reported on 10/18/2018 ), Disp: 30 tablet, Rfl: 2     Physical Exam:    Vitals:    10/18/18 0918   BP: 132/72   BP Location: Right arm   Patient Position: Sitting   Cuff Size: Large   Pulse: 90   Weight: (!) 142 kg (312 lb 9 6 oz)   Height: 5' 8 5" (1 74 m)       Physical Exam   Constitutional: She is oriented to person, place, and time  She appears well-developed and well-nourished  HENT:   Head: Normocephalic  Right Ear: External ear normal    Left Ear: External ear normal    Mouth/Throat: Oropharynx is clear and moist    Eyes: Pupils are equal, round, and reactive to light  Neck: No JVD present  Carotid bruit is not present  Cardiovascular: Normal rate, regular rhythm and intact distal pulses  Exam reveals no gallop and no friction rub  No murmur heard  Pulmonary/Chest: Effort normal and breath sounds normal  No tachypnea  No respiratory distress  She has no wheezes  She has no rales  She exhibits no tenderness  Abdominal: Soft  She exhibits no distension  There is no tenderness  There is no rebound and no guarding  Musculoskeletal: She exhibits no edema  Neurological: She is alert and oriented to person, place, and time  Skin: Skin is warm and dry  Psychiatric: She has a normal mood and affect   Her behavior is normal  Judgment and thought content normal    Nursing note and vitals reviewed  Labs:not applicable    Assessment/Plan:    1  Abnormal stress test-I would like to do a dobutamine stress echocardiogram to see if this is a false positive  2   Hypertension-given the enlargement of her ascending aorta seen on the 2nd most recent CT scan, ideally we would like to continue her on the labetalol  However it is possible the labial is making her asthma worse  I have switched her labetalol to Cardizem  3   Dilated pulmonary artery-this is noted  4    Ascending aortic dilatation/aneurysm-she will need follow-up yearly for this  5   Dizziness-this may be multifactorial etiology-I do not think she is having low blood pressure or bradycardia that could be causing this  I would like to see her back in 4-6 weeks for follow-up  Thank you so much, please do not hesitate to contact me if there any questions or concerns        Ana River MD  10/18/2018  9:21 AM

## 2018-10-21 DIAGNOSIS — I10 HYPERTENSION, UNSPECIFIED TYPE: ICD-10-CM

## 2018-10-21 DIAGNOSIS — E11.65 TYPE 2 DIABETES MELLITUS WITH HYPERGLYCEMIA, UNSPECIFIED WHETHER LONG TERM INSULIN USE (HCC): ICD-10-CM

## 2018-10-21 DIAGNOSIS — E78.49 OTHER HYPERLIPIDEMIA: ICD-10-CM

## 2018-10-21 RX ORDER — ATORVASTATIN CALCIUM 40 MG/1
TABLET, FILM COATED ORAL
Qty: 90 TABLET | Refills: 1 | Status: SHIPPED | OUTPATIENT
Start: 2018-10-21 | End: 2019-07-08 | Stop reason: ALTCHOICE

## 2018-10-21 RX ORDER — SPIRONOLACTONE 25 MG/1
TABLET ORAL
Qty: 90 TABLET | Refills: 1 | Status: SHIPPED | OUTPATIENT
Start: 2018-10-21 | End: 2019-04-03

## 2018-10-23 ENCOUNTER — APPOINTMENT (OUTPATIENT)
Dept: PHYSICAL THERAPY | Facility: CLINIC | Age: 58
End: 2018-10-23
Payer: COMMERCIAL

## 2018-10-25 ENCOUNTER — OFFICE VISIT (OUTPATIENT)
Dept: PHYSICAL THERAPY | Facility: CLINIC | Age: 58
End: 2018-10-25
Payer: COMMERCIAL

## 2018-10-25 DIAGNOSIS — R26.81 GAIT INSTABILITY: Primary | ICD-10-CM

## 2018-10-25 PROCEDURE — G8978 MOBILITY CURRENT STATUS: HCPCS

## 2018-10-25 PROCEDURE — 97110 THERAPEUTIC EXERCISES: CPT

## 2018-10-25 PROCEDURE — 97112 NEUROMUSCULAR REEDUCATION: CPT

## 2018-10-25 PROCEDURE — G8979 MOBILITY GOAL STATUS: HCPCS

## 2018-10-25 NOTE — PROGRESS NOTES
Daily Note     Today's date: 10/25/2018  Patient name: Geetha Garcia  : 1960  MRN: [id number]  Referring provider: Lizette Collazo MD  Dx:   Encounter Diagnosis     ICD-10-CM    1  Gait instability R26 81            9:00-9:38 1:1 CF     Subjective: Pt states she has been feeling a little better with less headaches but continues to have the light headiness and "dissy" feeling from time to time  Pt states she has a doctors appointment with her eye doctor next week to see of these symptoms have to do with her eyes         Objective: See treatment diary below  Precautions: Asthma, visual impairment, DM, peripheral neuropathy    BP pre-tx: 150/83 mmHg    BP post-tx: 153/77 mmHg       Daily Treatment Diary      Manual   9/12      9/25  9/27  10/2 10/4  10/11  10/25     Vitals pre  JH      TE  TE  ELIESER  TE  TE  CF                                                                                                           Exercise Diary   9/12  9/18  9/20  9/25  9/27  10/2  10/4  10/11  10/25     Bike  8'  np  5'  nv    5'  5'  nv  5'     Standing head turns --> walking    @ bar 6x  @ bar 8x  @ bar  2 laps  3 laps  3 laps  3 laps nv np      Tandem stance  2x 30" R,L 30"x2 ea  30"x2 ea  30"x2  30"x2 30"x2 ea  30"x2 ea  30"x2  30" x 2      SLS  4 x 10" R,L  20"x2 ea  30"x2 ea  30"x2  30"x2 ea  30"x2 ea  30"x2 ea 30"x2  30"x 2     Side stepping  @ bar 8x  @ bar 8x  @ bar 10x [email or web address] 2 laps  3 laps  5 laps  5 laps  nv  np      HR/TR 30x  30x  30x  30x  30 ea  30 ea  30 ea  30 ea  30x     R ankle TB 4 way    np  np  np               Step up/down    10x ea 1R  10x ea 1R  10 ea 1R  1R x 1 min 2R 1 min  2R 1 min  2R 1 min  np     Standing march    20x  20x  20x  2# x1 min  2# x1 min  2# 1min  2# 1 min  20x      Standing hip abd/ext  20x R,L PTB  20x ea PTB  20x ea OTB  20 ea tb nv  2# x1 min   2# x1 min  2# x1 min  2# x1 min  20x       Mini squats     20x  x20  1 min  20x  1 min  nv  20x                                                                                                                                                                                                      Gait training                             Modalities                                                                                                    Assessment: Tolerated treatment fair as she was fatigued at the beginning of session and throughout session  Pt has high BP but is within normal limits for her self  Supervising PT was informed of high BP and rest breaks were taken throughout session  Treatment was taken slowly with no strenuous activities and vitals where monitored throughout session  Patient demonstrated fatigue post treatment, exhibited good technique with therapeutic exercises and would benefit from continued PT  Plan: Continue per plan of care

## 2018-10-26 ENCOUNTER — OFFICE VISIT (OUTPATIENT)
Dept: PULMONOLOGY | Facility: CLINIC | Age: 58
End: 2018-10-26
Payer: COMMERCIAL

## 2018-10-26 VITALS
SYSTOLIC BLOOD PRESSURE: 138 MMHG | WEIGHT: 293 LBS | HEART RATE: 85 BPM | BODY MASS INDEX: 43.4 KG/M2 | DIASTOLIC BLOOD PRESSURE: 62 MMHG | OXYGEN SATURATION: 96 % | HEIGHT: 69 IN | RESPIRATION RATE: 17 BRPM | TEMPERATURE: 98.4 F

## 2018-10-26 DIAGNOSIS — E66.01 MORBID OBESITY (HCC): ICD-10-CM

## 2018-10-26 DIAGNOSIS — G47.33 OSA (OBSTRUCTIVE SLEEP APNEA): Primary | ICD-10-CM

## 2018-10-26 DIAGNOSIS — J45.21 MILD INTERMITTENT ASTHMA WITH EXACERBATION: ICD-10-CM

## 2018-10-26 DIAGNOSIS — R06.00 DOE (DYSPNEA ON EXERTION): ICD-10-CM

## 2018-10-26 DIAGNOSIS — J45.20 MILD INTERMITTENT ASTHMA, UNSPECIFIED WHETHER COMPLICATED: ICD-10-CM

## 2018-10-26 PROCEDURE — 99215 OFFICE O/P EST HI 40 MIN: CPT | Performed by: INTERNAL MEDICINE

## 2018-10-26 RX ORDER — BUDESONIDE AND FORMOTEROL FUMARATE DIHYDRATE 80; 4.5 UG/1; UG/1
2 AEROSOL RESPIRATORY (INHALATION) 2 TIMES DAILY
Qty: 1 INHALER | Refills: 5 | Status: SHIPPED | OUTPATIENT
Start: 2018-10-26 | End: 2020-10-07 | Stop reason: SDUPTHER

## 2018-10-26 NOTE — PROGRESS NOTES
Office Progress Note - Pulmonary    Dimitry Prom 59 y o  female MRN: [id number]    Encounter: [id number]      Assessment:  · Dyspnea on exertion  · Bronchial asthma  · Obstructive sleep apnea  · Morbid obesity  Plan:   · Polysomnogram with CPAP titration  · Symbicort 80/4 5, 2 inhalations twice a day  · Weight loss  · Regular walks to improve stamina  · Follow-up in 3 months  Discussion:   I had a long discussion with the patient regarding her symptoms of dyspnea on exertion  The are multifactorial and in part due to the morbid obesity and deconditioning  I do not think the underlying bronchial asthma is contributing to her shortness of breath on exertion  However, she has obstructive sleep apnea which was mild in January and since then she has gained more than 15 pounds and probably her obstructive sleep apnea has worsened  It has remained untreated  I have discussed with the patient the nature of obstructive sleep apnea and all the potential complications of untreated obstructive sleep apnea including cardiac arrhythmias and myocardial infarction  The patient agreed to go for a CPAP polysomnogram   Once the study is done will order the CPAP for her and I will have her come back and see me in 3 months  The patient has already received her influenza vaccine  Subjective: The patient is here for a follow-up visit  She stated her dyspnea on exertion has worsened  She is limited and not doing a lot of work  She is working from home and she does a lot of phone calls for work  She has occasional cough  No sputum production  Denies any chest pain or palpitations  She has not done the CPAP titration sleep study  Review of systems:  A 12 point system review is done and aside from what is stated above the rest of the review of systems is negative  Family history and social history are reviewed  Medications list is reviewed        Vitals: Blood pressure 138/62, pulse 85, temperature 98 4 °F (36 9 °C), temperature source Tympanic, resp  rate 17, height 5' 8 5" (1 74 m), weight (!) 143 kg (315 lb), SpO2 96 %, not currently breastfeeding ,     Physical Exam  Gen: Awake, alert, oriented x 3, no acute distress  HEENT: Mucous membranes moist, no oral lesions, no thrush  NECK: No accessory muscle use, JVP not elevated  Cardiac: Regular, single S1, single S2, no murmurs, no rubs, no gallops  Lungs:     Decreased breath sounds  No wheezing or rhonchi  Abdomen: normoactive bowel sounds, soft nontender, nondistended, no rebound or rigidity, no guarding  Extremities: no cyanosis, no clubbing, no edema  Neuro:  Grossly nonfocal   Skin:  No rash

## 2018-11-01 ENCOUNTER — APPOINTMENT (OUTPATIENT)
Dept: PHYSICAL THERAPY | Facility: CLINIC | Age: 58
End: 2018-11-01
Payer: COMMERCIAL

## 2018-11-01 LAB
LEFT EYE DIABETIC RETINOPATHY: NORMAL
RIGHT EYE DIABETIC RETINOPATHY: NORMAL
SEVERITY (EYE EXAM): NORMAL

## 2018-11-01 PROCEDURE — 3072F LOW RISK FOR RETINOPATHY: CPT | Performed by: INTERNAL MEDICINE

## 2018-11-05 ENCOUNTER — OFFICE VISIT (OUTPATIENT)
Dept: DIABETES SERVICES | Facility: CLINIC | Age: 58
End: 2018-11-05
Payer: MEDICARE

## 2018-11-05 DIAGNOSIS — IMO0002 UNCONTROLLED TYPE 2 DIABETES MELLITUS WITH OPHTHALMIC COMPLICATION, WITH LONG-TERM CURRENT USE OF INSULIN: Primary | ICD-10-CM

## 2018-11-05 PROCEDURE — G0108 DIAB MANAGE TRN  PER INDIV: HCPCS | Performed by: DIETITIAN, REGISTERED

## 2018-11-05 NOTE — PATIENT INSTRUCTIONS
1   Complete 3 day food diary and return to RD in one week  2   Complete carb quiz and return with food records

## 2018-11-05 NOTE — PROGRESS NOTES
Carbohydrate Counting Instruction    Met with Vasyl Tiwari for carbohydrate counting  Ashley Leonardo is currently on the following insulin regimen: Humalog 18 units before all meals and Basaglar 43 units at bedtime  Ashley Leonardo admits to frequently missing her insulin before the lunch meal secondary to "running around getting things done" before works starts at 12:30PM  Discussed running errands earlier to be home at noon to eat lunch prior to work (patient works in telephone sales)  Ashley Leonardo appears to understand that she will have a difficult time managing her BG if she continues to miss her mealtime insulin  Ashley Leonardo reports taking Humalog at the start of the meal   Advised her to take it 10 minutes prior to eating for best results  Patient admits to grazing and snacking on popcorn and fruit between meals  She understands that this, too, could be adding to poorly controlled BG  Patient instructed on: Carbohydrate counting  Instruction was provided using power point slides, food labels and an interactive carbohydrate counting activity  Patient appeared to have fair comprehension of the materials presented at the visit  Ashley Leonardo did poorly on the carbohydrate counting activity she completed at the visit  Ashley Leonardo may have had difficulty reading without her reading glasses and guessed at the answers rather that use the portion booklet she was given  Patient agreed to try and complete the carb counting quiz at home with the use of her readers and keep a 3 day food record and return them in one week for assessment  Comments: Ashley Leonardo has questionable understanding of carbohydrate counting  Diabetes Education Record: Ashley Leonardo was given the following education material: Portions Booklet, carb counting quiz and food record         Patient response to instruction  Comprehension: fair  Motivation: fair  Expected Compliance: fair-poor  Readiness: contemplation  Barriers to Learning: none known     Start- Stop: 9:15-10:15  Total Minutes: 60 Minutes  Group or Individual Instruction: DSMT-I  Other: Leeroy Slade PA-C    Thank you for referring your patient to [facility], it was a pleasure working with them today  Please feel free to call with any questions or concerns      Eileen Franklin  [address] [facility] [phone]

## 2018-11-06 ENCOUNTER — OFFICE VISIT (OUTPATIENT)
Dept: PHYSICAL THERAPY | Facility: CLINIC | Age: 58
End: 2018-11-06
Payer: COMMERCIAL

## 2018-11-06 DIAGNOSIS — R26.81 GAIT INSTABILITY: Primary | ICD-10-CM

## 2018-11-06 PROCEDURE — 97110 THERAPEUTIC EXERCISES: CPT

## 2018-11-06 NOTE — PROGRESS NOTES
Daily Note     Today's date: 2018  Patient name: Mika Quijano  : 1960  MRN: [id number]  Referring provider: Renee West MD  Dx:   Encounter Diagnosis     ICD-10-CM    1  Gait instability R26 81                Subjective:  Pt presents to PT reporting no dizziness and mild HA  She reports eye MD appointment stating MD states dizziness and other sx may be coming from eye problems  She also states she is having eye surgery  Pt also reports she has been AMB without SPC "for about a week"  She reports increased speed walking without the cane  Noted elevated BP and HR and denies any cardiac signs or sx  DPT Analilia Ao was advised of BP and HR and recommended seated TE  Pt was reminded to breath through the exercises  Pt reports a verbal understanding  Pt's BP and HR decreased post PT session and she continues to deny cardiac signs or sx  Reviewed cardiac signs and SX and was advised to contact 911 or go to ER if she experiences any of them  She reports a verbal understanding        Objective: See treatment diary below  Precautions: Asthma, visual impairment, DM, peripheral neuropathy    BP pre-tx: 164/86 mmHg  HR 90 BPM   BP post-tx: 152/84  MmHg  HR 84 BPM       Daily Treatment Diary      Manual   9/12      9/25  9/27  10/2 10/4  10/11  10/25  11/6   Vitals pre  JH      TE  TE  ELIESER  TE  TE  CF  PK                                                                                                         Exercise Diary   9/12  9/18  9/20  9/25  9/27  10/2  10/4  10/11  10/25  11/6   Bike  8'  np  5'  nv    5'  5'  nv  5'  np   Standing head turns --> walking    @ bar 6x  @ bar 8x  @ bar  2 laps  3 laps  3 laps  3 laps nv np   np   Tandem stance  2x 30" R,L 30"x2 ea  30"x2 ea  30"x2  30"x2 30"x2 ea  30"x2 ea  30"x2  30" x 2   np   SLS  4 x 10" R,L  20"x2 ea  30"x2 ea  30"x2  30"x2 ea  30"x2 ea  30"x2 ea 30"x2  30"x 2  np   Side stepping  @ bar 8x  @ bar 8x  @ bar 10x [email or web address] 2 laps  3 laps  5 laps  5 laps  nv  np   np   HR/TR 30x  30x  30x  30x  30 ea  30 ea  30 ea  30 ea  30x  seated  30x    R ankle TB 4 way    np  np  np            grn 3" x 10 ea   Step up/down    10x ea 1R  10x ea 1R  10 ea 1R  1R x 1 min 2R 1 min  2R 1 min  2R 1 min  np  np   Standing march    20x  20x  20x  2# x1 min  2# x1 min  2# 1min  2# 1 min  20x   np   Standing hip abd/ext  20x R,L PTB  20x ea PTB  20x ea OTB  20 ea tb nv  2# x1 min   2# x1 min  2# x1 min  2# x1 min  20x   np    Mini squats     20x  x20  1 min  20x  1 min  nv  20x   np    Hip add seated                   3" x 1 min     Hip abd seated                    grn 3" x 1    LAQ                    5" x 1 min                                                                                                                           Gait training                             Modalities                                                                                                    Assessment: Modified program today secondary BP  Pt demonstrates good tolerance to modified program today with no complaints  Patient demonstrated fatigue post treatment, exhibited good technique with therapeutic exercises and would benefit from continued PT  Plan: Continue per plan of care

## 2018-11-08 ENCOUNTER — OFFICE VISIT (OUTPATIENT)
Dept: PHYSICAL THERAPY | Facility: CLINIC | Age: 58
End: 2018-11-08
Payer: COMMERCIAL

## 2018-11-08 DIAGNOSIS — R26.81 GAIT INSTABILITY: Primary | ICD-10-CM

## 2018-11-08 PROCEDURE — 97110 THERAPEUTIC EXERCISES: CPT

## 2018-11-08 NOTE — PROGRESS NOTES
Daily Note     Today's date: 2018  Patient name: Ronald Stevenson  : 1960  MRN: [id number]  Referring provider: Bright Pandey MD  Dx:   Encounter Diagnosis     ICD-10-CM    1  Gait instability R26 81                Subjective:  Pt presents to PT reporting no dizziness and mild HA  She reports eye MD appointment stating MD states dizziness and other sx may be coming from eye problems  She also states she is having eye surgery on   Pt's BP and HR decreased post PT session and she continues to deny cardiac signs or sx  Reviewed cardiac signs and SX and was advised to contact 911 or go to ER if she experiences any of them  She reports a verbal understanding        Objective: See treatment diary below  Precautions: Asthma, visual impairment, DM, peripheral neuropathy    BP pre-tx: 154/78 mmHg ( HR 90 BPM -np)  BP adriana-tx 158/88 mmHg  BP post-tx: 160/84  MmHg  (HR 84 BPM-np)       Daily Treatment Diary      Manual   11/8      9/25  9/27  10/2 10/4  10/11  10/25  11/6   Vitals pre TE      TE  TE  ELIESER  TE  TE  CF  PK                                                                                                         Exercise Diary   11/8  9/18  9/20  9/25  9/27  10/2  10/4  10/11  10/25  11/6   Bike  np  np  5'  nv    5'  5'  nv  5'  np   Standing head turns --> walking    @ bar 6x  @ bar 8x  @ bar  2 laps  3 laps  3 laps  3 laps nv np   np   Tandem stance   30"x2 ea  30"x2 ea  30"x2  30"x2 30"x2 ea  30"x2 ea  30"x2  30" x 2   np   SLS    20"x2 ea  30"x2 ea  30"x2  30"x2 ea  30"x2 ea  30"x2 ea 30"x2  30"x 2  np   Side stepping    @ bar 8x  @ bar 10x [email or web address] 2 laps  3 laps  5 laps  5 laps  nv  np   np   HR/TR Seated x1 min  30x  30x  30x  30 ea  30 ea  30 ea  30 ea  30x  seated  30x    R ankle TB 4 way    np  np  np            grn 3" x 10 ea   Step up/down    10x ea 1R  10x ea 1R  10 ea 1R  1R x 1 min 2R 1 min  2R 1 min  2R 1 min  np  np   Standing march  seated 1' x2  20x  20x  20x  2# x1 min  2# x1 min  2# 1min  2# 1 min  20x   np   Standing hip abd/ext  20x R,L PTB  20x ea PTB  20x ea OTB  20 ea tb nv  2# x1 min   2# x1 min  2# x1 min  2# x1 min  20x   np    Mini squats     20x  x20  1 min  20x  1 min  nv  20x   np    Hip add seated  3" x2 min                 3" x 1 min     Hip abd seated  grn x2 min                  grn 3" x 1    LAQ  1 min ea                  5" x 1 min                                                                                                                           Gait training                             Modalities                                                                                                    Assessment: Modified program today secondary BP  Pt demonstrates good tolerance to modified program today with no complaints  Patient demonstrated fatigue post treatment, exhibited good technique with therapeutic exercises and would benefit from continued PT  Plan: Continue per plan of care

## 2018-11-13 ENCOUNTER — APPOINTMENT (OUTPATIENT)
Dept: PHYSICAL THERAPY | Facility: CLINIC | Age: 58
End: 2018-11-13
Payer: COMMERCIAL

## 2018-11-15 ENCOUNTER — APPOINTMENT (OUTPATIENT)
Dept: PHYSICAL THERAPY | Facility: CLINIC | Age: 58
End: 2018-11-15
Payer: COMMERCIAL

## 2018-11-29 ENCOUNTER — OFFICE VISIT (OUTPATIENT)
Dept: PHYSICAL THERAPY | Facility: CLINIC | Age: 58
End: 2018-11-29
Payer: COMMERCIAL

## 2018-11-29 DIAGNOSIS — R26.81 GAIT INSTABILITY: Primary | ICD-10-CM

## 2018-11-29 PROCEDURE — G8978 MOBILITY CURRENT STATUS: HCPCS

## 2018-11-29 PROCEDURE — G8979 MOBILITY GOAL STATUS: HCPCS

## 2018-11-29 PROCEDURE — 97164 PT RE-EVAL EST PLAN CARE: CPT

## 2018-11-29 NOTE — PROGRESS NOTES
PT Re-Evaluation     Today's date: 2018  Patient name: Jayson Norton  : 1960  MRN: [id number]  Referring provider: Ce Monk MD  Dx:   Encounter Diagnosis     ICD-10-CM    1  Gait instability R26 81        Start Time: 77  Stop Time: 1000  Total time in clinic (min): 50 minutes    Assessment  Assessment details: Since starting PT, patient has improved with balance, endurance and gait deviations  Patient is now able to amb without a SPC in her home and only uses it for uneven surfaces  Patient improved to 15 laps when compared to 10 laps for the 6 MWT 2* improved endurance  Patient demonstrates minimal sway during balance activity on non-compliant surfaces and with eyes open/closed  Although patient has made improvements, patient will continue to benefit from continued PT services to address endurance, balance and strength to allow the patient to navigate her home and community with improved confidence and decrease her fall risk  PT recommended 2x/week for 3-4 weeks c a good prognosis 2* noted improvements  Impairments: abnormal gait, activity intolerance, impaired balance, impaired physical strength, lacks appropriate home exercise program and safety issue  Understanding of Dx/Px/POC: good   Prognosis: good    Goals  4 weeks  1  Patient will maintain SLS bilaterally for greater than 10 seconds without external assist (in progress- 1 HHA)  2  Patient will complete 6 minute walk test (In progress- one 10 sec break)  3  Patient will demonstrate at least 4+/5 ankle strength in all planes (in progress)    Discharge  1  Patient will be independent with HEP (MET)  2  Patient will safely navigate 2 flights of steps to access home (MET)  3   Patient will improve FOTO to at least 67/100 (In progress)    Plan  Patient would benefit from: skilled physical therapy  Planned therapy interventions: manual therapy, abdominal trunk stabilization, neuromuscular re-education, balance, functional ROM exercises, gait training, home exercise program, transfer training, therapeutic training, therapeutic exercise, therapeutic activities, stretching and strengthening  Frequency: 2x week  Duration in visits: 8  Duration in weeks: 4  Treatment plan discussed with: patient        Subjective Evaluation    History of Present Illness  Mechanism of injury: Patient presents with complaints of gait instability, present since losing vision in 2010  Occular implants in place  Patient presents with [facility], has been using for about 3 months  Recent hospitalization secondary to LEON, SOB without relief of symptoms with typical nebulizer treatment  Recent diagnosis of asthma  Peripheral neuropathy reduces ambulation tolerance  Patient denies recent falls, however reports multiple near-falls  (11/29/18) Patient noted that she feels that PT is helping her walking  She feels stronger in (B) LEs and only uses the cane when on uneven surfaces for balance  Pain  No pain reported          Objective     Ambulation     Comments   (IE)Patient ambulates on level surface with intermittent SPC use, slight right lateral trunk lean  Prior to ambulation:  SaO2 97% on RA, HR 75 bpm /70mmHg  Post 6 min walk test O2 92%, HR 95 with recovery to 98%, 85 bpm within 3 minutes of rest  * Discussed use of pursed lip breathing with patient during times of SOB to reduce early airway closure    (IE)Able to complete 5 min 13 sec of ambulation, 10 lengths in giraldo, increased SPC and lateral deviation after 5th length, R>L  - Decreased foot clearance with fatigue    (11/29/18) 6 min walk test    Able to complete 15 lengths of the giraldo and took a rest at 5:25 for 10 sec, she completed the full 6 mins without a SPC and lateral lean after min 5  SaO2: Pre: 96%, Post: 97%, 3 mins post: 98%  HR: Pre: 90 bpm, Post: 106 bpm, 3 mins post: 90 bpm  BP: Pre: 127/75 mmHg, Post: 144/75 mmmHg, 3 mins post: 124/68 mmHg        Functional Assessment     Comments  R SLS 30 sec with 1 HHA, L 30 sec with 2 HHA  Able to maintain tandem stance 30 seconds bilaterally  NBOS firm/foam EO/EC: able to maintain for 30 sec in all conditions, minimal sway with foam conditions however able to correct with use of appropriate balance strategies    R ankle eversion 4-, L ankle inv 4, otherwise 4+/5      Flowsheet Rows      Most Recent Value   PT/OT G-Codes   Current Score  59   Projected Score  67   FOTO information reviewed  Yes   Assessment Type  Re-evaluation   G code set  Mobility: Walking & Moving Around   Mobility: Walking and Moving Around Current Status ()  CK   Mobility: Walking and Moving Around Goal Status ()  CJ          Precautions: Asthma, visual impairment, DM, peripheral neuropathy    Daily Treatment Diary       Manual   11/8  11/29    9/25  9/27  10/2 10/4  10/11  10/25  11/6   Vitals pre TE  Re-eval    TE  TE  ELIESER  TE  TE  CF  PK                                                                                                         Exercise Diary   11/8  11/29  9/20  9/25  9/27  10/2  10/4  10/11  10/25  11/6   Bike  np  nv  5'  nv    5'  5'  nv  5'  np   Standing head turns --> walking    nv  @ bar 8x  @ bar  2 laps  3 laps  3 laps  3 laps nv np   np   Tandem stance   nv  30"x2 ea  30"x2  30"x2 30"x2 ea  30"x2 ea  30"x2  30" x 2   np   SLS    nv  30"x2 ea  30"x2  30"x2 ea  30"x2 ea  30"x2 ea 30"x2  30"x 2  np   Side stepping    nv  @ bar 10x [email or web address] 2 laps  3 laps  5 laps  5 laps  nv  np   np   HR/TR Seated x1 min  30x  30x  30x  30 ea  30 ea  30 ea  30 ea  30x  seated  30x    R ankle TB 4 way    nv  np  np            grn 3" x 10 ea   Step up/down   nv  10x ea 1R  10 ea 1R  1R x 1 min 2R 1 min  2R 1 min  2R 1 min  np  np   Standing march  seated 1' x2 nv  20x  20x  2# x1 min  2# x1 min  2# 1min  2# 1 min  20x   np   Standing hip abd/ext  20x R,L PTB  nv  20x ea OTB  20 ea tb nv  2# x1 min   2# x1 min  2# x1 min  2# x1 min  20x   np    Mini squats     20x  x20  1 min  20x  1 min  nv  20x   np    Hip add seated  3" x2 min                 3" x 1 min     Hip abd seated  grn x2 min                  grn 3" x 1    LAQ  1 min ea                  5" x 1 min                                                                                                                           Gait training                             Modalities

## 2018-12-11 ENCOUNTER — TELEPHONE (OUTPATIENT)
Dept: PULMONOLOGY | Facility: CLINIC | Age: 58
End: 2018-12-11

## 2018-12-11 ENCOUNTER — APPOINTMENT (OUTPATIENT)
Dept: PHYSICAL THERAPY | Facility: CLINIC | Age: 58
End: 2018-12-11
Payer: COMMERCIAL

## 2018-12-11 ENCOUNTER — HOSPITAL ENCOUNTER (OUTPATIENT)
Dept: NON INVASIVE DIAGNOSTICS | Facility: HOSPITAL | Age: 58
Discharge: HOME | End: 2018-12-11
Attending: INTERNAL MEDICINE

## 2018-12-11 DIAGNOSIS — I10 ESSENTIAL HYPERTENSION: ICD-10-CM

## 2018-12-11 NOTE — TELEPHONE ENCOUNTER
I received a call from [employer] / ?????.?? regarding the prior auth request submitted for a facility-based cpap titration study  The request is being denied  Dr Rosalba Palacios has the option to order a home apap study or do a [id number] review  For [id number]-nk-mhqq:  Call [phone] TO SCHEDULE  Member ID# [id number]   1960  Ref# [id number]  This call has been routed to Dr Rosalba Palacios and his Medical Assistant

## 2018-12-12 ENCOUNTER — OFFICE VISIT (OUTPATIENT)
Dept: INTERNAL MEDICINE CLINIC | Facility: CLINIC | Age: 58
End: 2018-12-12
Payer: MEDICARE

## 2018-12-12 VITALS
WEIGHT: 293 LBS | DIASTOLIC BLOOD PRESSURE: 78 MMHG | HEIGHT: 69 IN | SYSTOLIC BLOOD PRESSURE: 142 MMHG | TEMPERATURE: 97.8 F | BODY MASS INDEX: 43.4 KG/M2 | HEART RATE: 96 BPM | RESPIRATION RATE: 16 BRPM

## 2018-12-12 DIAGNOSIS — G63 POLYNEUROPATHY ASSOCIATED WITH UNDERLYING DISEASE (HCC): ICD-10-CM

## 2018-12-12 DIAGNOSIS — E78.5 HYPERLIPIDEMIA, UNSPECIFIED HYPERLIPIDEMIA TYPE: ICD-10-CM

## 2018-12-12 DIAGNOSIS — I10 ESSENTIAL HYPERTENSION: ICD-10-CM

## 2018-12-12 DIAGNOSIS — R06.00 EXERTIONAL DYSPNEA: ICD-10-CM

## 2018-12-12 DIAGNOSIS — IMO0002 UNCONTROLLED TYPE 2 DIABETES MELLITUS WITH OPHTHALMIC COMPLICATION, WITH LONG-TERM CURRENT USE OF INSULIN: Primary | ICD-10-CM

## 2018-12-12 DIAGNOSIS — B35.1 ONYCHOMYCOSIS: ICD-10-CM

## 2018-12-12 PROCEDURE — 99214 OFFICE O/P EST MOD 30 MIN: CPT | Performed by: INTERNAL MEDICINE

## 2018-12-12 NOTE — ASSESSMENT & PLAN NOTE
Lab Results   Component Value Date    HGBA1C 11 1 (H) 06/25/2018       No results for input(s): POCGLU in the last 72 hours  Blood Sugar Average: Last 72 hrs:   not well controlled, advised to write on blood sugar checks  Have regular regimen  Avoid snacking of unhealthy snacks  Try carrots, baby tomatoes, peanut butter, celery has snack options

## 2018-12-12 NOTE — PROGRESS NOTES
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, with long-term current use of insulin (HCC)  Lab Results   Component Value Date    HGBA1C 11 1 (H) 06/25/2018       No results for input(s): POCGLU in the last 72 hours  Blood Sugar Average: Last 72 hrs:   not well controlled, advised to write on blood sugar checks  Have regular regimen  Avoid snacking of unhealthy snacks  Try carrots, baby tomatoes, peanut butter, celery has snack options  Hypertension  Slightly elevated  She notes that she stopped the valsartan hydrochlorothiazide couple of months ago due to concern of carcinogenic is  She is to continue spironolactone, diltiazem that was started by Cardiology recently and amlodipine  Ideally she would benefit from an Arb  I do not Brittany Sanjeev started at this time since she has not had any blood work looking into her potassium and creatinine and since August   I asked her to get the blood work done  If potassium is within normal limits, a low-dose losartan could be started  Blood pressure slightly elevated today  Exertional dyspnea  Await stress echocardiogram, sleep study  Diagnoses and all orders for this visit:    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, with long-term current use of insulin (HCC)    Essential hypertension    Polyneuropathy associated with underlying disease ([address] )    Hyperlipidemia, unspecified hyperlipidemia type    Onychomycosis  -     Ambulatory referral to Podiatry; Future    Exertional dyspnea          Subjective:   Chief Complaint   Patient presents with    Follow-up     3 month        Patient ID: Shorty Sandoval is a 59 y o  female  She comes in for follow-up of hypertension, hyperlipidemia, pulmonary hypertension, diabetes, chronic dyspnea    Saw opthalmologist recently, needed laser treatment  BS continue to be elevated  Working almost 10-12 hours a day, eating is not very regular   Eats breakfast at 9, then eats snacks at work, then one meal at 5, comes home around 10 then eats cereal before going to bed  Gait is improved with PT  Continue to get short of breath with exerrtion  The following portions of the patient's history were reviewed and updated as appropriate: current medications, past medical history, past social history and past surgical history      PHQ-9 Depression Screening    PHQ-9:    Frequency of the following problems over the past two weeks:                Current Outpatient Prescriptions:     albuterol (PROVENTIL HFA) 90 mcg/act inhaler, Inhale 2 puffs every 6 (six) hours as needed for wheezing For another 24 hours use every 4 hours standing, Disp: 6 7 g, Rfl: 0    amLODIPine (NORVASC) 10 mg tablet, Take 1 tablet by mouth daily, Disp: , Rfl:     aspirin 81 MG tablet, Take 1 tablet by mouth daily, Disp: , Rfl:     atorvastatin (LIPITOR) 40 mg tablet, TAKE 1 TABLET BY MOUTH  DAILY, Disp: 90 tablet, Rfl: 1    BASAGLAR KWIKPEN 100 units/mL injection pen, 43 Units, Disp: , Rfl: 5    [employer] LANCETS lancets, Inject 1 applicator into the skin 4 (four) times a day, Disp: , Rfl:     Blood Pressure Monitor KIT, Check blood pressures BID, Disp: 1 each, Rfl: 0    budesonide-formoterol (SYMBICORT) 80-4 5 MCG/ACT inhaler, Inhale 2 puffs 2 (two) times a day Rinse mouth after use , Disp: 1 Inhaler, Rfl: 5    Cholecalciferol (VITAMIN D3) 3000 units TABS, Take by mouth, Disp: , Rfl:     CONTOUR NEXT TEST test strip, 4 (four) times a day Test blood sugar, Disp: , Rfl: 1    diltiazem (CARDIZEM CD) 180 mg 24 hr capsule, Take 1 capsule (180 mg total) by mouth daily, Disp: 30 capsule, Rfl: 5    gabapentin (NEURONTIN) 100 mg capsule, Take 1 capsule (100 mg total) by mouth 2 (two) times a day, Disp: 180 capsule, Rfl: 0    insulin lispro (HUMALOG KWIKPEN) 100 units/mL injection pen, Inject 10-18 Units under the skin 3 (three) times a day with meals 18 before breakfast 15 before lunch 20 before dinner plus sliding scale (Patient taking differently: Inject 10-18 Units under the skin 3 (three) times a day with meals 18 before breakfast 15 before lunch 20 before dinner plus sliding scale ), Disp: 5 pen, Rfl: 0    Insulin Pen Needle (BD PEN NEEDLE DERRICK U/F) 32G X 4 MM MISC, Inject 1 applicator under the skin 4 (four) times a day, Disp: , Rfl:     metFORMIN (GLUCOPHAGE) 500 mg tablet, TAKE 2 TABLETS BY MOUTH  DAILY WITH DINNER, Disp: 180 tablet, Rfl: 1    spironolactone (ALDACTONE) 25 mg tablet, TAKE 1 TABLET BY MOUTH  DAILY, Disp: 90 tablet, Rfl: 1    Review of Systems   Constitutional: Negative for fatigue, fever and unexpected weight change  HENT: Negative for ear pain, hearing loss and sore throat  Eyes: Negative for pain and discharge  Respiratory: Positive for shortness of breath  Negative for cough and chest tightness  Cardiovascular: Negative for chest pain and palpitations  Gastrointestinal: Negative for abdominal pain, blood in stool, constipation, diarrhea and nausea  Genitourinary: Negative for dysuria, frequency and hematuria  Musculoskeletal: Positive for arthralgias  Negative for joint swelling  Skin: Negative for rash  Allergic/Immunologic: Negative for immunocompromised state  Neurological: Negative for dizziness and headaches  Hematological: Negative for adenopathy  Psychiatric/Behavioral: Negative for confusion and sleep disturbance  Objective:  /78 (BP Location: Left arm, Patient Position: Sitting, Cuff Size: Standard)   Pulse 96   Temp 97 8 °F (36 6 °C)   Resp 16   Ht 5' 8 5" (1 74 m)   Wt (!) 142 kg (313 lb)   BMI 46 90 kg/m²      Physical Exam   Constitutional: She appears well-developed and well-nourished  HENT:   Head: Normocephalic and atraumatic     Right Ear: Tympanic membrane normal    Left Ear: Tympanic membrane normal    Nose: Nose normal    Mouth/Throat: Oropharynx is clear and moist  No posterior oropharyngeal edema or posterior oropharyngeal erythema  Eyes: Pupils are equal, round, and reactive to light  Conjunctivae are normal  Right eye exhibits no discharge  Neck: Normal range of motion  Neck supple  No thyromegaly present  Cardiovascular: Normal rate, regular rhythm, S1 normal, S2 normal and normal heart sounds  PMI is not displaced  Pulses are weak pulses  No murmur heard  Pulses:       Dorsalis pedis pulses are 1+ on the right side, and 1+ on the left side  Posterior tibial pulses are 0 on the right side, and 0 on the left side  Pulmonary/Chest: Effort normal and breath sounds normal  No accessory muscle usage  No apnea  No respiratory distress  She has no rhonchi  She has no rales  Abdominal: Soft  Normal appearance and bowel sounds are normal  She exhibits no shifting dullness  There is no hepatosplenomegaly  There is no tenderness  There is no rebound and no CVA tenderness  Musculoskeletal: Normal range of motion  She exhibits no edema or tenderness  Feet:    Feet:   Right Foot:   Skin Integrity: Positive for dry skin  Negative for ulcer, skin breakdown, erythema, warmth or callus  Left Foot:   Skin Integrity: Positive for dry skin  Negative for ulcer, skin breakdown, erythema, warmth or callus  Lymphadenopathy:     She has no cervical adenopathy  Neurological: She is alert  Skin: Skin is warm and intact  No rash noted  Psychiatric: She has a normal mood and affect  Her speech is normal    Nursing note and vitals reviewed  Patient's shoes and socks removed  Right Foot/Ankle   Right Foot Inspection  Skin Exam: skin normal, skin intact and dry skin no warmth, no callus, no erythema, no maceration, no abnormal color, no pre-ulcer, no ulcer and no callus                          Toe Exam: ROM and strength within normal limitsno swelling, erythema and  no right toe deformity  Sensory       Monofilament testing: intact  Vascular    The right DP pulse is 1+  The right PT pulse is 0  Left Foot/Ankle  Left Foot Inspection  Skin Exam: skin normal, skin intact and dry skinno warmth, no erythema, no maceration, normal color, no pre-ulcer, no ulcer and no callus                         Toe Exam: ROM and strength within normal limitsno swelling, no erythema and no left toe deformity                   Sensory       Monofilament: intact  Vascular    The left DP pulse is 1+  The left PT pulse is 0  Assign Risk Category:  No deformity present; No loss of protective sensation; Weak pulses       Risk: 1      Recent Results (from the past 1008 hour(s))    Diabetes Eye Exam    Collection Time: 11/01/18  1:45 PM   Result Value Ref Range    Severity Alert     Right Eye Diabetic Retinopathy Mild     Left Eye Diabetic Retinopathy Mild    ]    No results found

## 2018-12-12 NOTE — ASSESSMENT & PLAN NOTE
Slightly elevated  She notes that she stopped the valsartan hydrochlorothiazide couple of months ago due to concern of carcinogenic is  She is to continue spironolactone, diltiazem that was started by Cardiology recently and amlodipine  Ideally she would benefit from an Arb  I do not Leah  started at this time since she has not had any blood work looking into her potassium and creatinine and since August   I asked her to get the blood work done  If potassium is within normal limits, a low-dose losartan could be started  Blood pressure slightly elevated today

## 2018-12-13 ENCOUNTER — OFFICE VISIT (OUTPATIENT)
Dept: PHYSICAL THERAPY | Facility: CLINIC | Age: 58
End: 2018-12-13
Payer: COMMERCIAL

## 2018-12-13 DIAGNOSIS — R26.81 GAIT INSTABILITY: Primary | ICD-10-CM

## 2018-12-13 PROCEDURE — 97110 THERAPEUTIC EXERCISES: CPT | Performed by: PHYSICAL THERAPIST

## 2018-12-13 PROCEDURE — 97112 NEUROMUSCULAR REEDUCATION: CPT | Performed by: PHYSICAL THERAPIST

## 2018-12-13 NOTE — PROGRESS NOTES
Daily Note     Today's date: 2018  Patient name: Brayan Ayala  : 1960  MRN: [id number]  Referring provider: Zen Xie MD  Dx:   Encounter Diagnosis     ICD-10-CM    1  Gait instability R26 81                Subjective:  Pt presents to therapy reporting she has been feeling more balanced lately  Notes that after she had laser eye surgery, she has noticed some improvements in balance confidence  Reports she has been going on walks more often and dancing lately       Vital Pre- /82 mmHg, HR 96 bpm, SaO2 97%    Objective: See treatment diary below    Precautions: Asthma, visual impairment, DM, peripheral neuropathy     Daily Treatment Diary      Manual   11/8  11/29  12/13  9/25  9/27  10/2 10/4  10/11  10/25  11/6   Vitals pre TE  Re-eval  ELIESER  TE  TE  ELIESER  TE  TE  CF  PK                                                                                                         Exercise Diary   11/8  11/29 12/13  9/25  9/27  10/2  10/4  10/11  10/25  11/6   Bike  np Derrel Duffy   nv    5'  5'  nv  5'  np   Standing head turns --> walking    nv    @ bar  2 laps  3 laps  3 laps  3 laps nv np   np   Tandem stance   nv  foam  30"x2 ea  30"x2  30"x2 30"x2 ea  30"x2 ea  30"x2  30" x 2   np   SLS    nv  30"x2 ea  30"x2  30"x2 ea  30"x2 ea  30"x2 ea 30"x2  30"x 2  np   Side stepping    nv  @ bar 10x [email or web address] 2 laps  3 laps  5 laps  5 laps  nv  np   np   HR/TR Seated x1 min  30x  30x  30x  30 ea  30 ea  30 ea  30 ea  30x  seated  30x    R ankle TB 4 way    nv  np  np            grn 3" x 10 ea   Step up/down   nv  10x ea 1R  10 ea 1R  1R x 1 min 2R 1 min  2R 1 min  2R 1 min  np  np   Standing march  seated 1' x2 nv 2#   1 min  20x  2# x1 min  2# x1 min  2# 1min  2# 1 min  20x   np   Standing hip abd/ext  20x R,L PTB  nv  2#   1 min ea  20 ea tb nv  2# x1 min   2# x1 min  2# x1 min  2# x1 min  20x   np    Mini squats     20x  x20  1 min  20x  1 min  nv  20x   np    Hip add seated  3" x2 min     3" x2 min             3" x 1 min     Hip abd seated  grn x2 min     3" x2 min              grn 3" x 1    LAQ  1 min ea     1 min ea              5" x 1 min                                                                                                                           Gait training                             Modalities                                                                                                    Assessment: Pt demonstrates good tolerance to modified program today with no complaints  Patient demonstrated fatigue post treatment, exhibited good technique with therapeutic exercises and would benefit from continued PT  Plan: Continue per plan of care

## 2018-12-18 ENCOUNTER — OFFICE VISIT (OUTPATIENT)
Dept: PHYSICAL THERAPY | Facility: CLINIC | Age: 58
End: 2018-12-18
Payer: COMMERCIAL

## 2018-12-18 DIAGNOSIS — R26.81 GAIT INSTABILITY: Primary | ICD-10-CM

## 2018-12-18 PROCEDURE — 97140 MANUAL THERAPY 1/> REGIONS: CPT

## 2018-12-18 PROCEDURE — 97110 THERAPEUTIC EXERCISES: CPT

## 2018-12-18 NOTE — TELEPHONE ENCOUNTER
Called to reschedule the peer to peer  Spoke to Peak and gave him all of the information   Dr Jose Valente will be the doctor for the peer to peer with Dr Severiano Oakes on Wednesday January 2, 2019 at 12:20pm to 12:40pm on [phone]

## 2018-12-18 NOTE — PROGRESS NOTES
Daily Note     Today's date: 2018  Patient name: Gabriele Grace  : 1960  MRN: [id number]  Referring provider: Nomi Vicente MD  Dx:   Encounter Diagnosis     ICD-10-CM    1  Gait instability R26 81                Subjective:  Pt presents to PT w/o AD with no c/o dizziness, LOB and/or falls since last visit      Vital Pre- /80 mmHg, HR 87 bpm, SaO2 97%    Objective: See treatment diary below    Precautions: Asthma, visual impairment, DM, peripheral neuropathy     Daily Treatment Diary      Manual   11/8  11/29  12/13  12/18  9/27  10/2 10/4  10/11  10/25  11/6   Vitals pre TE  Re-eval  ELIESER  TE  TE  ELIESER  TE  TE  CF  PK                                                                                                         Exercise Diary   11/8  11/29 12/13  12/18  9/27  10/2  10/4  10/11  10/25  11/6   Bike  np  nv  5 min    5'  5'  nv  5'  np   Standing head turns --> walking    nv    @ bar  2 laps  3 laps  3 laps  3 laps nv np   np   Tandem stance   nv  foam  30"x2 ea  30"x2  30"x2 30"x2 ea  30"x2 ea  30"x2  30" x 2   np   SLS    nv  30"x2 ea  30"x2  30"x2 ea  30"x2 ea  30"x2 ea 30"x2  30"x 2  np   Side stepping    nv  @ bar  3 laps  [email or web address] 2 laps  3 laps  5 laps  5 laps  nv  np   np   HR/TR Seated x1 min  30x  30x  30x  30 ea  30 ea  30 ea  30 ea  30x  seated  30x    R ankle TB 4 way    nv  np  np            grn 3" x 10 ea   Step up/down   nv  10x ea 1R  10 ea 1R  1R x 1 min 2R 1 min  2R 1 min  2R 1 min  np  np   Standing march  seated 1' x2 nv 2#   1 min  20x  2# x1 min  2# x1 min  2# 1min  2# 1 min  20x   np   Standing hip abd/ext  20x R,L PTB  nv  2#   2x10 ea  20 ea tb nv  2# x1 min   2# x1 min  2# x1 min  2# x1 min  20x   np    Mini squats     20x  x20  1 min  20x  1 min  nv  20x   np    Hip add seated  3" x2 min     3" x2 min  3" x2 min           3" x 1 min     Hip abd seated  grn x2 min     3" x2 min  3" x2 min            grn 3" x 1    LAQ  1 min ea     1 min ea  3# 2 min alt            5" x 1 min                                                                                                                           Gait training                             Modalities                                                                                                    Assessment: Pt demonstrates good tolerance with exercises with no complaints  Resumed exercises as listed  Patient demonstrated fatigue post treatment, exhibited good technique with therapeutic exercises and would benefit from continued PT  Plan: Continue per plan of care

## 2018-12-20 ENCOUNTER — OFFICE VISIT (OUTPATIENT)
Dept: CARDIOLOGY CLINIC | Facility: CLINIC | Age: 58
End: 2018-12-20
Payer: COMMERCIAL

## 2018-12-20 ENCOUNTER — EVALUATION (OUTPATIENT)
Dept: PHYSICAL THERAPY | Facility: CLINIC | Age: 58
End: 2018-12-20
Payer: COMMERCIAL

## 2018-12-20 VITALS
HEIGHT: 69 IN | DIASTOLIC BLOOD PRESSURE: 84 MMHG | HEART RATE: 88 BPM | BODY MASS INDEX: 43.4 KG/M2 | SYSTOLIC BLOOD PRESSURE: 138 MMHG | WEIGHT: 293 LBS | RESPIRATION RATE: 18 BRPM

## 2018-12-20 VITALS — SYSTOLIC BLOOD PRESSURE: 162 MMHG | DIASTOLIC BLOOD PRESSURE: 90 MMHG | HEART RATE: 85 BPM

## 2018-12-20 DIAGNOSIS — R26.81 GAIT INSTABILITY: Primary | ICD-10-CM

## 2018-12-20 DIAGNOSIS — R94.39 ABNORMAL STRESS TEST: Primary | ICD-10-CM

## 2018-12-20 PROCEDURE — G8990 OTHER PT/OT CURRENT STATUS: HCPCS | Performed by: PHYSICAL THERAPIST

## 2018-12-20 PROCEDURE — G8991 OTHER PT/OT GOAL STATUS: HCPCS | Performed by: PHYSICAL THERAPIST

## 2018-12-20 PROCEDURE — 99214 OFFICE O/P EST MOD 30 MIN: CPT | Performed by: INTERNAL MEDICINE

## 2018-12-20 PROCEDURE — 97112 NEUROMUSCULAR REEDUCATION: CPT | Performed by: PHYSICAL THERAPIST

## 2018-12-20 PROCEDURE — G8992 OTHER PT/OT  D/C STATUS: HCPCS | Performed by: PHYSICAL THERAPIST

## 2018-12-20 NOTE — PROGRESS NOTES
PT Re-Evaluation  and PT Discharge    Today's date: 2018  Patient name: Soo Greer  : 1960  MRN: [id number]  Referring provider: Alayna John MD  Dx:   Encounter Diagnosis     ICD-10-CM    1  Gait instability R26 [id number]                   Assessment  Assessment details: Soo Greer has been treated in outpatient physical therapy for diagnosis/complaints of Gait instability  (primary encounter diagnosis)  Pt demonstrates increased range of motion, improved strength, decreased pain, and increased activity tolerance  Pt has achieved goals and maximal benefit from skilled physical therapy care at present time  Pt appropriate to be discharged at present time to [facility]  Thank you for the opportunity to share in this patient's care  Impairments: abnormal gait, activity intolerance, impaired balance, impaired physical strength, lacks appropriate home exercise program and safety issue  Understanding of Dx/Px/POC: good   Prognosis: good    Goals  4 weeks  1  Patient will maintain SLS bilaterally for greater than 10 seconds without external assist (in progress- 1 HHA) - PARTIALLY MET  2  Patient will complete 6 minute walk test (In progress- one 10 sec break)  3  Patient will demonstrate at least 4+/5 ankle strength in all planes (in progress) - PARTIALLY MET    Discharge  1  Patient will be independent with HEP (MET)  2  Patient will safely navigate 2 flights of steps to access home (MET)  3  Patient will improve FOTO to at least 67/100 (MET)    Plan  Plan details: Discharge to [facility]    Patient would benefit from: skilled physical therapy  Planned therapy interventions: manual therapy, abdominal trunk stabilization, neuromuscular re-education, balance, functional ROM exercises, gait training, home exercise program, transfer training, therapeutic training, therapeutic exercise, therapeutic activities, stretching and strengthening  Treatment plan discussed with: patient        Subjective Evaluation    History of Present Illness  Mechanism of injury: Patient presents with complaints of gait instability, present since losing vision in 2010  Occular implants in place  Patient presents with [facility], has been using for about 3 months  Recent hospitalization secondary to LEON, SOB without relief of symptoms with typical nebulizer treatment  Recent diagnosis of asthma  Peripheral neuropathy reduces ambulation tolerance  Patient denies recent falls, however reports multiple near-falls  (11/29/18) Patient noted that she feels that PT is helping her walking  She feels stronger in (B) LEs and only uses the cane when on uneven surfaces for balance  12/20/18: Pt reports she feels her walking tolerance has improved  However, continues to report SOB after climbing one flight of stairs  Reports she no loner utilizes SPC at all, stating she feels her balance has improved to the point that she no longer feels the need for an assistive device  Reports she feels her SOB is main limiting factor towards walking and stair climbing tolerance  States she is schedule for more diagnostic testing to determine etiology of SOB  Reports she feels she has met goals at present time for balance dysfunction  Patient Goals  Patient goals for therapy: improved balance, increased strength, independence with ADLs/IADLs and return to sport/leisure activities          Objective     Ambulation     Comments   (IE)Patient ambulates on level surface with intermittent SPC use, slight right lateral trunk lean  Prior to ambulation:  SaO2 97% on RA, HR 75 bpm /70mmHg  Post 6 min walk test O2 92%, HR 95 with recovery to 98%, 85 bpm within 3 minutes of rest  * Discussed use of pursed lip breathing with patient during times of SOB to reduce early airway closure    (IE)Able to complete 5 min 13 sec of ambulation, 10 lengths in giraldo, increased SPC and lateral deviation after 5th length, R>L  - Decreased foot clearance with fatigue    (11/29/18) 6 min walk test    Able to complete 15 lengths of the giraldo and took a rest at 5:25 for 10 sec, she completed the full 6 mins without a SPC and lateral lean after min 5  SaO2: Pre: 96%, Post: 97%, 3 mins post: 98%  HR: Pre: 90 bpm, Post: 106 bpm, 3 mins post: 90 bpm  BP: Pre: 127/75 mmHg, Post: 144/75 mmmHg, 3 mins post: 124/68 mmHg        Functional Assessment     Comments  Last RE:   R SLS 30 sec with 1 HHA, L 30 sec with 2 HHA  Able to maintain tandem stance 30 seconds bilaterally  NBOS firm/foam EO/EC: able to maintain for 30 sec in all conditions, minimal sway with foam conditions however able to correct with use of appropriate balance strategies  R ankle eversion 4-, L ankle inv 4, otherwise 4+/5    RE 12/20:  R SLS 17sec with no HHA, L 30 sec with 1 HHA  Able to maintain tandem stance 30 seconds bilaterally  NBOS firm/foam EO/EC: able to maintain for 30 sec in all conditions, minimal sway with foam conditions however able to correct with use of appropriate balance strategies    R ankle eversion 4, L ankle inv 4, otherwise 4+/5      Flowsheet Rows      Most Recent Value   PT/OT G-Codes   Current Score  60   Projected Score  67   Assessment Type  Re-evaluation   G code set  Other PT/OT Primary   Other PT Primary Current Status ()  CJ   Other PT Primary Goal Status ()  CJ          Precautions: Asthma, visual impairment, DM, peripheral neuropathy     Daily Treatment Diary      Manual   11/8  11/29  12/13  12/18 12/20  10/2 10/4  10/11  10/25  11/6   Vitals pre TE  Re-eval  ELIESER  TE ELIESER  ELIESER  TE  TE  CF  PK                            RE-eval          ELIESER                                                                   Exercise Diary   11/8  11/29 12/13  12/18 12/20  10/2  10/4  10/11  10/25  11/6   Bike  np  nv  5 min   5'  5'  nv  5'  np   Standing head turns --> walking    nv    @ bar  2 laps   3 laps  3 laps nv np   np   Tandem stance   nv  foam  30"x2 ea  30"x2  30"x2 ea  30"x2 ea  30"x2  30" x 2   np   SLS    nv  30"x2 ea  30"x2   30"x2 ea  30"x2 ea 30"x2  30"x 2  np   Side stepping    nv  @ bar  3 laps  [email or web address] 2 laps   5 laps  5 laps  nv  np   np   HR/TR Seated x1 min  30x  30x  30x   30 ea  30 ea  30 ea  30x  seated  30x    R ankle TB 4 way    nv  np  np           grn 3" x 10 ea   Step up/down   nv  10x ea 1R  10 ea 1R  2R 1 min  2R 1 min  2R 1 min  np  np   Standing march  seated 1' x2 nv 2#   1 min  20x   2# x1 min  2# 1min  2# 1 min  20x   np   Standing hip abd/ext  20x R,L PTB  nv  2#   2x10 ea  20 ea tb nv    2# x1 min  2# x1 min  2# x1 min  20x   np    Mini squats     20x  x20   20x  1 min  nv  20x   np    Hip add seated  3" x2 min     3" x2 min  3" x2 min          3" x 1 min     Hip abd seated  grn x2 min     3" x2 min  3" x2 min           grn 3" x 1    LAQ  1 min ea     1 min ea  3# 2 min alt           5" x 1 min                                                                                                                          Gait training                             Modalities

## 2018-12-20 NOTE — PROGRESS NOTES
[address] [facility] [place]  [address]  [facility] [address] [facility]  [phone]    Cardiology Follow up    Patient:  Dylan Van  :  1960  MRN:  [id number]    History of Present Illness:     53-year-old female with past medical history of diabetes since , positive nuclear stress test, neuropathy, hypertension with severe left ventricular hypertrophy, family history of cardiovascular disease in her mother who had sudden cardiac death at 66years old, aneurysmal ascending aorta measuring up to 4 3 cm, prominent main pulmonary artery at 3 5 cm, recently diagnosed reportedly mild sleep apnea not yet on CPAP, recently diagnosed asthma remote history of seizures, presents for follow-up       She denies any chest pain but does get some dyspnea on exertion and dizziness with standing  She has not had any palpitations, or syncope  She continues to work from home      Patient Active Problem List   Diagnosis    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, with long-term current use of insulin ([facility] [address] )    Hypertension    Seizures (HCC)    Exertional dyspnea    Enlarged pulmonary artery (HCC)    Aortic dilatation (HCC)    Anemia    Decreased pulses in feet    Diabetes mellitus type 2 with complications, uncontrolled (HCC)    Dyslipidemia    Fatigue    Hyperlipidemia    Microalbuminuria    Mild obstructive sleep apnea    Obesity    Peripheral neuropathy    Prolonged QT interval    Scalp avulsion    Visual impairment in both eyes    Vitamin D deficiency    Lung nodules    Orthostatic hypotension    Mild intermittent asthma with exacerbation       Past Surgical History  Past Surgical History:   Procedure Laterality Date    CATARACT EXTRACTION Bilateral     august and september    EYE SURGERY      HYSTERECTOMY         Social History   Social History     Social History    Marital status: /Civil Union     Spouse name: N/A    Number of children: N/A    Years of education: N/A     Occupational History    employed      Social History Main Topics    Smoking status: Never Smoker    Smokeless tobacco: Never Used    Alcohol use No    Drug use: No    Sexual activity: No     Other Topics Concern    Not on file     Social History Narrative    Caffeine use            Allergies   Allergen Reactions    2,4-D Dimethylamine (Amisol)      Patient unsure why this is here        Family History   Family History   Problem Relation Age of Onset    Heart attack Mother     Heart disease Mother     Hypertension Mother     No Known Problems Father     Heart disease Sister     No Known Problems Brother     No Known Problems Son     No Known Problems Daughter     Heart attack Maternal Grandmother     Diabetes Other     Heart attack Other        Review of Systems:  Review of Systems   Constitutional: Negative for chills, fatigue and fever  HENT: Negative for hearing loss, nosebleeds and tinnitus  Eyes: Negative for pain  Respiratory: Negative for cough, chest tightness and shortness of breath  Cardiovascular: Negative for chest pain, palpitations and leg swelling  Gastrointestinal: Negative for abdominal pain, nausea and vomiting  Endocrine: Negative for cold intolerance and heat intolerance  Genitourinary: Negative for difficulty urinating, hematuria and vaginal bleeding  Musculoskeletal: Negative for arthralgias  Skin: Negative for rash  Allergic/Immunologic: Negative for environmental allergies  Neurological: Negative for dizziness, syncope, weakness and light-headedness  Psychiatric/Behavioral: Negative for decreased concentration and sleep disturbance  The patient is not nervous/anxious            Current Outpatient Prescriptions:     albuterol (PROVENTIL HFA) 90 mcg/act inhaler, Inhale 2 puffs every 6 (six) hours as needed for wheezing For another 24 hours use every 4 hours standing, Disp: 6 7 g, Rfl: 0    amLODIPine (NORVASC) 10 mg tablet, Take 1 tablet by mouth daily, Disp: , Rfl:     aspirin 81 MG tablet, Take 1 tablet by mouth daily, Disp: , Rfl:     atorvastatin (LIPITOR) 40 mg tablet, TAKE 1 TABLET BY MOUTH  DAILY, Disp: 90 tablet, Rfl: 1    BASAGLAR KWIKPEN 100 units/mL injection pen, 43 Units, Disp: , Rfl: 5    SARAH MICROLET LANCETS lancets, Inject 1 applicator into the skin 4 (four) times a day, Disp: , Rfl:     Blood Pressure Monitor KIT, Check blood pressures BID, Disp: 1 each, Rfl: 0    budesonide-formoterol (SYMBICORT) 80-4 5 MCG/ACT inhaler, Inhale 2 puffs 2 (two) times a day Rinse mouth after use , Disp: 1 Inhaler, Rfl: 5    Cholecalciferol (VITAMIN D3) 3000 units TABS, Take by mouth, Disp: , Rfl:     CONTOUR NEXT TEST test strip, 4 (four) times a day Test blood sugar, Disp: , Rfl: 1    diltiazem (CARDIZEM CD) 180 mg 24 hr capsule, Take 1 capsule (180 mg total) by mouth daily, Disp: 30 capsule, Rfl: 5    gabapentin (NEURONTIN) 100 mg capsule, Take 1 capsule (100 mg total) by mouth 2 (two) times a day, Disp: 180 capsule, Rfl: 0    insulin lispro (HUMALOG KWIKPEN) 100 units/mL injection pen, Inject 10-18 Units under the skin 3 (three) times a day with meals 18 before breakfast 15 before lunch 20 before dinner plus sliding scale (Patient taking differently: Inject 10-18 Units under the skin 3 (three) times a day with meals 18 before breakfast 15 before lunch 20 before dinner plus sliding scale ), Disp: 5 pen, Rfl: 0    Insulin Pen Needle (BD PEN NEEDLE DERRICK U/F) 32G X 4 MM MISC, Inject 1 applicator under the skin 4 (four) times a day, Disp: , Rfl:     metFORMIN (GLUCOPHAGE) 500 mg tablet, TAKE 2 TABLETS BY MOUTH  DAILY WITH DINNER, Disp: 180 tablet, Rfl: 1    spironolactone (ALDACTONE) 25 mg tablet, TAKE 1 TABLET BY MOUTH  DAILY, Disp: 90 tablet, Rfl: 1     Physical Exam:    There were no vitals filed for this visit  Physical Exam   Constitutional: She is oriented to person, place, and time   She appears well-developed and well-nourished  HENT:   Head: Normocephalic  Right Ear: External ear normal    Left Ear: External ear normal    Mouth/Throat: Oropharynx is clear and moist    Eyes: Pupils are equal, round, and reactive to light  Neck: No JVD present  Carotid bruit is not present  Cardiovascular: Normal rate, regular rhythm and intact distal pulses  Exam reveals no gallop and no friction rub  No murmur heard  Pulmonary/Chest: Effort normal and breath sounds normal  No tachypnea  No respiratory distress  She has no wheezes  She has no rales  She exhibits no tenderness  Abdominal: Soft  She exhibits no distension  There is no tenderness  There is no rebound and no guarding  Musculoskeletal: She exhibits no edema  Neurological: She is alert and oriented to person, place, and time  Skin: Skin is warm and dry  Psychiatric: She has a normal mood and affect  Her behavior is normal  Judgment and thought content normal    Nursing note and vitals reviewed  Labs:not applicable    Assessment/Plan:    1  Positive stress test with dyspnea on exertion-I would like her to have a cardiac catheterization to follow the abnormal stress test     2  Hypertension-she will need angiotensin receptor blocker restarted in near future  We can also consider increasing the calcium channel blocker as she does have a dilated aorta  3  Dilated ascending aorta and dilated pulmonary artery-she will need follow up on this periodically  4  Obstructive sleep apnea not on CPAP-this is noted  We will see her back in 1 month after the cardiac catheterization      Thank you so much, please do not hesitate to contact me with any questions or concerns      Eusebia Harrell MD  12/20/2018  9:30 AM

## 2018-12-20 NOTE — LETTER
2018     Louie Ohm, [address] [facility]    Patient: Soo Greer   YOB: 1960   Date of Visit: 2018       Dear Dr Luda Glaser:    Thank you for referring Jose Alfredo Sprague to me for evaluation  Below are my notes for this consultation  If you have questions, please do not hesitate to call me  I look forward to following your patient along with you  Sincerely,        Ramandeep Simpson MD        CC: No Recipients  Ramandeep Simpson MD  2018  9:56 AM  Sign at close encounter  [address] Cardiology [facility] AT [facility] [address] [facility]  [phone]    Cardiology Follow up    Patient:  Soo Greer  :  1960  MRN:  [id number]    History of Present Illness:     47-year-old female with past medical history of diabetes since , positive nuclear stress test, neuropathy, hypertension with severe left ventricular hypertrophy, family history of cardiovascular disease in her mother who had sudden cardiac death at 66years old, aneurysmal ascending aorta measuring up to 4 3 cm, prominent main pulmonary artery at 3 5 cm, recently diagnosed reportedly mild sleep apnea not yet on CPAP, recently diagnosed asthma remote history of seizures, presents for follow-up       She denies any chest pain but does get some dyspnea on exertion and dizziness with standing  She has not had any palpitations, or syncope  She continues to work from home      Patient Active Problem List   Diagnosis    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, with long-term current use of insulin ([facility] [address] )    Hypertension    Seizures (HCC)    Exertional dyspnea    Enlarged pulmonary artery (HCC)    Aortic dilatation (HCC)    Anemia    Decreased pulses in feet    Diabetes mellitus type 2 with complications, uncontrolled (HCC)    Dyslipidemia    Fatigue    Hyperlipidemia    Microalbuminuria    Mild obstructive sleep apnea    Obesity  Peripheral neuropathy    Prolonged QT interval    Scalp avulsion    Visual impairment in both eyes    Vitamin D deficiency    Lung nodules    Orthostatic hypotension    Mild intermittent asthma with exacerbation       Past Surgical History  Past Surgical History:   Procedure Laterality Date    CATARACT EXTRACTION Bilateral     august and september    EYE SURGERY      HYSTERECTOMY         Social History   Social History     Social History    Marital status: /Civil Union     Spouse name: N/A    Number of children: N/A    Years of education: N/A     Occupational History    employed      Social History Main Topics    Smoking status: Never Smoker    Smokeless tobacco: Never Used    Alcohol use No    Drug use: No    Sexual activity: No     Other Topics Concern    Not on file     Social History Narrative    Caffeine use            Allergies   Allergen Reactions    2,4-D Dimethylamine (Amisol)      Patient unsure why this is here        Family History   Family History   Problem Relation Age of Onset    Heart attack Mother     Heart disease Mother     Hypertension Mother     No Known Problems Father     Heart disease Sister     No Known Problems Brother     No Known Problems Son     No Known Problems Daughter     Heart attack Maternal Grandmother     Diabetes Other     Heart attack Other        Review of Systems:  Review of Systems   Constitutional: Negative for chills, fatigue and fever  HENT: Negative for hearing loss, nosebleeds and tinnitus  Eyes: Negative for pain  Respiratory: Negative for cough, chest tightness and shortness of breath  Cardiovascular: Negative for chest pain, palpitations and leg swelling  Gastrointestinal: Negative for abdominal pain, nausea and vomiting  Endocrine: Negative for cold intolerance and heat intolerance  Genitourinary: Negative for difficulty urinating, hematuria and vaginal bleeding  Musculoskeletal: Negative for arthralgias  Skin: Negative for rash  Allergic/Immunologic: Negative for environmental allergies  Neurological: Negative for dizziness, syncope, weakness and light-headedness  Psychiatric/Behavioral: Negative for decreased concentration and sleep disturbance  The patient is not nervous/anxious            Current Outpatient Prescriptions:     albuterol (PROVENTIL HFA) 90 mcg/act inhaler, Inhale 2 puffs every 6 (six) hours as needed for wheezing For another 24 hours use every 4 hours standing, Disp: 6 7 g, Rfl: 0    amLODIPine (NORVASC) 10 mg tablet, Take 1 tablet by mouth daily, Disp: , Rfl:     aspirin 81 MG tablet, Take 1 tablet by mouth daily, Disp: , Rfl:     atorvastatin (LIPITOR) 40 mg tablet, TAKE 1 TABLET BY MOUTH  DAILY, Disp: 90 tablet, Rfl: 1    BASAGLAR KWIKPEN 100 units/mL injection pen, 43 Units, Disp: , Rfl: 5    [employer] MICROLET LANCETS lancets, Inject 1 applicator into the skin 4 (four) times a day, Disp: , Rfl:     Blood Pressure Monitor KIT, Check blood pressures BID, Disp: 1 each, Rfl: 0    budesonide-formoterol (SYMBICORT) 80-4 5 MCG/ACT inhaler, Inhale 2 puffs 2 (two) times a day Rinse mouth after use , Disp: 1 Inhaler, Rfl: 5    Cholecalciferol (VITAMIN D3) 3000 units TABS, Take by mouth, Disp: , Rfl:     CONTOUR NEXT TEST test strip, 4 (four) times a day Test blood sugar, Disp: , Rfl: 1    diltiazem (CARDIZEM CD) 180 mg 24 hr capsule, Take 1 capsule (180 mg total) by mouth daily, Disp: 30 capsule, Rfl: 5    gabapentin (NEURONTIN) 100 mg capsule, Take 1 capsule (100 mg total) by mouth 2 (two) times a day, Disp: 180 capsule, Rfl: 0    insulin lispro (HUMALOG KWIKPEN) 100 units/mL injection pen, Inject 10-18 Units under the skin 3 (three) times a day with meals 18 before breakfast 15 before lunch 20 before dinner plus sliding scale (Patient taking differently: Inject 10-18 Units under the skin 3 (three) times a day with meals 18 before breakfast 15 before lunch 20 before dinner plus sliding scale ), Disp: 5 pen, Rfl: 0    Insulin Pen Needle (BD PEN NEEDLE DERRICK U/F) 32G X 4 MM MISC, Inject 1 applicator under the skin 4 (four) times a day, Disp: , Rfl:     metFORMIN (GLUCOPHAGE) 500 mg tablet, TAKE 2 TABLETS BY MOUTH  DAILY WITH DINNER, Disp: 180 tablet, Rfl: 1    spironolactone (ALDACTONE) 25 mg tablet, TAKE 1 TABLET BY MOUTH  DAILY, Disp: 90 tablet, Rfl: 1     Physical Exam:    There were no vitals filed for this visit  Physical Exam   Constitutional: She is oriented to person, place, and time  She appears well-developed and well-nourished  HENT:   Head: Normocephalic  Right Ear: External ear normal    Left Ear: External ear normal    Mouth/Throat: Oropharynx is clear and moist    Eyes: Pupils are equal, round, and reactive to light  Neck: No JVD present  Carotid bruit is not present  Cardiovascular: Normal rate, regular rhythm and intact distal pulses  Exam reveals no gallop and no friction rub  No murmur heard  Pulmonary/Chest: Effort normal and breath sounds normal  No tachypnea  No respiratory distress  She has no wheezes  She has no rales  She exhibits no tenderness  Abdominal: Soft  She exhibits no distension  There is no tenderness  There is no rebound and no guarding  Musculoskeletal: She exhibits no edema  Neurological: She is alert and oriented to person, place, and time  Skin: Skin is warm and dry  Psychiatric: She has a normal mood and affect  Her behavior is normal  Judgment and thought content normal    Nursing note and vitals reviewed  Labs:not applicable    Assessment/Plan:    1  Positive stress test with dyspnea on exertion-I would like her to have a cardiac catheterization to follow the abnormal stress test     2  Hypertension-she will need angiotensin receptor blocker restarted in near future  We can also consider increasing the calcium channel blocker as she does have a dilated aorta      3  Dilated ascending aorta and dilated pulmonary artery-she will need follow up on this periodically  4  Obstructive sleep apnea not on CPAP-this is noted  We will see her back in 1 month after the cardiac catheterization      Thank you so much, please do not hesitate to contact me with any questions or concerns      Redgie Riedel, MD  12/20/2018  9:30 AM

## 2018-12-26 DIAGNOSIS — IMO0002 UNCONTROLLED TYPE 2 DIABETES MELLITUS WITH OPHTHALMIC COMPLICATION, WITH LONG-TERM CURRENT USE OF INSULIN: Primary | ICD-10-CM

## 2018-12-27 ENCOUNTER — APPOINTMENT (OUTPATIENT)
Dept: PHYSICAL THERAPY | Facility: CLINIC | Age: 58
End: 2018-12-27
Payer: COMMERCIAL

## 2018-12-28 ENCOUNTER — TELEPHONE (OUTPATIENT)
Dept: OTHER | Facility: HOSPITAL | Age: 58
End: 2018-12-28

## 2018-12-29 NOTE — TELEPHONE ENCOUNTER
Pt called on answering service for emergency refill for humalog   Verbal order given to pharmacy for Ahmet Medel MD

## 2019-01-02 ENCOUNTER — TELEPHONE (OUTPATIENT)
Dept: OTHER | Facility: HOSPITAL | Age: 59
End: 2019-01-02

## 2019-01-02 DIAGNOSIS — G47.33 OSA (OBSTRUCTIVE SLEEP APNEA): Primary | ICD-10-CM

## 2019-01-02 NOTE — TELEPHONE ENCOUNTER
I had peer to peer review with the insurance company  They are preferring to prescribed auto PAP as a trial   Will prescribe day new CPAP and have the patient seen in 2 months

## 2019-01-03 ENCOUNTER — APPOINTMENT (OUTPATIENT)
Dept: LAB | Facility: HOSPITAL | Age: 59
End: 2019-01-03
Payer: COMMERCIAL

## 2019-01-03 DIAGNOSIS — R94.39 ABNORMAL STRESS TEST: ICD-10-CM

## 2019-01-03 DIAGNOSIS — IMO0002 UNCONTROLLED TYPE 2 DIABETES MELLITUS WITH OPHTHALMIC COMPLICATION, WITH LONG-TERM CURRENT USE OF INSULIN: ICD-10-CM

## 2019-01-03 DIAGNOSIS — E78.5 HYPERLIPIDEMIA, UNSPECIFIED HYPERLIPIDEMIA TYPE: ICD-10-CM

## 2019-01-03 DIAGNOSIS — E55.9 VITAMIN D DEFICIENCY: ICD-10-CM

## 2019-01-03 DIAGNOSIS — G47.33 OBSTRUCTIVE SLEEP APNEA (ADULT) (PEDIATRIC): Primary | ICD-10-CM

## 2019-01-03 LAB
25(OH)D3 SERPL-MCNC: 41.8 NG/ML (ref 30–100)
ALBUMIN SERPL BCP-MCNC: 4 G/DL (ref 3–5.2)
ALP SERPL-CCNC: 159 U/L (ref 43–122)
ALT SERPL W P-5'-P-CCNC: 23 U/L (ref 9–52)
ANION GAP SERPL CALCULATED.3IONS-SCNC: 8 MMOL/L (ref 5–14)
ANISOCYTOSIS BLD QL SMEAR: PRESENT
AST SERPL W P-5'-P-CCNC: 20 U/L (ref 14–36)
BASOPHILS # BLD AUTO: 0.1 THOUSANDS/ΜL (ref 0–0.1)
BASOPHILS NFR BLD AUTO: 1 % (ref 0–1)
BILIRUB SERPL-MCNC: 0.6 MG/DL
BUN SERPL-MCNC: 21 MG/DL (ref 5–25)
CALCIUM SERPL-MCNC: 9.7 MG/DL (ref 8.4–10.2)
CHLORIDE SERPL-SCNC: 99 MMOL/L (ref 97–108)
CHOLEST SERPL-MCNC: 158 MG/DL
CO2 SERPL-SCNC: 30 MMOL/L (ref 22–30)
CREAT SERPL-MCNC: 0.94 MG/DL (ref 0.6–1.2)
CREAT UR-MCNC: 171 MG/DL
EOSINOPHIL # BLD AUTO: 0.3 THOUSAND/ΜL (ref 0–0.4)
EOSINOPHIL NFR BLD AUTO: 3 % (ref 0–6)
ERYTHROCYTE [DISTWIDTH] IN BLOOD BY AUTOMATED COUNT: 15.2 %
EST. AVERAGE GLUCOSE BLD GHB EST-MCNC: 260 MG/DL
GFR SERPL CREATININE-BSD FRML MDRD: 76 ML/MIN/1.73SQ M
GLUCOSE P FAST SERPL-MCNC: 286 MG/DL (ref 70–99)
HBA1C MFR BLD: 10.7 % (ref 4.2–6.3)
HCT VFR BLD AUTO: 39.7 % (ref 36–46)
HDLC SERPL-MCNC: 41 MG/DL (ref 40–59)
HGB BLD-MCNC: 12.5 G/DL (ref 12–16)
INR PPP: 0.97 (ref 0.89–1.1)
LDLC SERPL CALC-MCNC: 98 MG/DL
LYMPHOCYTES # BLD AUTO: 2.1 THOUSANDS/ΜL (ref 0.5–4)
LYMPHOCYTES NFR BLD AUTO: 27 % (ref 25–45)
MCH RBC QN AUTO: 24.7 PG (ref 26–34)
MCHC RBC AUTO-ENTMCNC: 31.4 G/DL (ref 31–36)
MCV RBC AUTO: 79 FL (ref 80–100)
MICROALBUMIN UR-MCNC: 539 MG/L (ref 0–20)
MICROALBUMIN/CREAT 24H UR: 315 MG/G CREATININE (ref 0–30)
MICROCYTES BLD QL AUTO: PRESENT
MONOCYTES # BLD AUTO: 0.3 THOUSAND/ΜL (ref 0.2–0.9)
MONOCYTES NFR BLD AUTO: 4 % (ref 1–10)
NEUTROPHILS # BLD AUTO: 5 THOUSANDS/ΜL (ref 1.8–7.8)
NEUTS SEG NFR BLD AUTO: 65 % (ref 45–65)
PLATELET # BLD AUTO: 297 THOUSANDS/UL (ref 150–450)
PLATELET BLD QL SMEAR: ADEQUATE
PMV BLD AUTO: 9.5 FL (ref 8.9–12.7)
POIKILOCYTOSIS BLD QL SMEAR: PRESENT
POTASSIUM SERPL-SCNC: 4.9 MMOL/L (ref 3.6–5)
PROT SERPL-MCNC: 7.3 G/DL (ref 5.9–8.4)
PROTHROMBIN TIME: 10.3 SECONDS (ref 9.5–11.6)
RBC # BLD AUTO: 5.05 MILLION/UL (ref 4–5.2)
RBC MORPH BLD: NORMAL
SODIUM SERPL-SCNC: 137 MMOL/L (ref 137–147)
T4 FREE SERPL-MCNC: 1.04 NG/DL (ref 0.76–1.46)
TRIGL SERPL-MCNC: 93 MG/DL
TSH SERPL DL<=0.05 MIU/L-ACNC: 1.68 UIU/ML (ref 0.47–4.68)
WBC # BLD AUTO: 7.7 THOUSAND/UL (ref 4.5–11)

## 2019-01-03 PROCEDURE — 82306 VITAMIN D 25 HYDROXY: CPT | Performed by: PHYSICIAN ASSISTANT

## 2019-01-03 PROCEDURE — 84439 ASSAY OF FREE THYROXINE: CPT | Performed by: PHYSICIAN ASSISTANT

## 2019-01-03 PROCEDURE — 83704 LIPOPROTEIN BLD QUAN PART: CPT

## 2019-01-03 PROCEDURE — 82570 ASSAY OF URINE CREATININE: CPT | Performed by: PHYSICIAN ASSISTANT

## 2019-01-03 PROCEDURE — 80053 COMPREHEN METABOLIC PANEL: CPT

## 2019-01-03 PROCEDURE — 80061 LIPID PANEL: CPT | Performed by: INTERNAL MEDICINE

## 2019-01-03 PROCEDURE — 80061 LIPID PANEL: CPT

## 2019-01-03 PROCEDURE — 82043 UR ALBUMIN QUANTITATIVE: CPT | Performed by: PHYSICIAN ASSISTANT

## 2019-01-03 PROCEDURE — 36415 COLL VENOUS BLD VENIPUNCTURE: CPT | Performed by: INTERNAL MEDICINE

## 2019-01-03 PROCEDURE — 85025 COMPLETE CBC W/AUTO DIFF WBC: CPT | Performed by: INTERNAL MEDICINE

## 2019-01-03 PROCEDURE — 85610 PROTHROMBIN TIME: CPT | Performed by: INTERNAL MEDICINE

## 2019-01-03 PROCEDURE — 83036 HEMOGLOBIN GLYCOSYLATED A1C: CPT | Performed by: PHYSICIAN ASSISTANT

## 2019-01-03 PROCEDURE — 84443 ASSAY THYROID STIM HORMONE: CPT | Performed by: PHYSICIAN ASSISTANT

## 2019-01-03 RX ORDER — SODIUM CHLORIDE 9 MG/ML
100 INJECTION, SOLUTION INTRAVENOUS ONCE
Status: CANCELLED | OUTPATIENT
Start: 2019-01-07

## 2019-01-04 ENCOUNTER — TELEPHONE (OUTPATIENT)
Dept: SURGERY | Facility: HOSPITAL | Age: 59
End: 2019-01-04

## 2019-01-06 LAB
CHOLEST SERPL-MCNC: 149 MG/DL (ref 100–199)
HDL SERPL-SCNC: 32.6 UMOL/L
HDLC SERPL-MCNC: 46 MG/DL
LDL SERPL QN: 21.3 NM
LDL SERPL QN: 436 NMOL/L
LDL SERPL-SCNC: 1183 NMOL/L
LDLC SERPL CALC-MCNC: 84 MG/DL (ref 0–99)
LP-IR SCORE SERPL: 64
TRIGL SERPL-MCNC: 96 MG/DL (ref 0–149)

## 2019-01-07 ENCOUNTER — HOSPITAL ENCOUNTER (OUTPATIENT)
Dept: NON INVASIVE DIAGNOSTICS | Facility: HOSPITAL | Age: 59
Discharge: HOME | End: 2019-01-07
Attending: INTERNAL MEDICINE | Admitting: INTERNAL MEDICINE
Payer: COMMERCIAL

## 2019-01-07 VITALS
BODY MASS INDEX: 43.4 KG/M2 | WEIGHT: 293 LBS | OXYGEN SATURATION: 97 % | DIASTOLIC BLOOD PRESSURE: 72 MMHG | HEART RATE: 88 BPM | TEMPERATURE: 97.6 F | RESPIRATION RATE: 16 BRPM | HEIGHT: 69 IN | SYSTOLIC BLOOD PRESSURE: 158 MMHG

## 2019-01-07 DIAGNOSIS — IMO0002 UNCONTROLLED TYPE 2 DIABETES MELLITUS WITH OPHTHALMIC COMPLICATION, WITH LONG-TERM CURRENT USE OF INSULIN: Primary | ICD-10-CM

## 2019-01-07 DIAGNOSIS — R94.39 ABNORMAL STRESS TEST: ICD-10-CM

## 2019-01-07 DIAGNOSIS — E11.65 TYPE 2 DIABETES MELLITUS WITH HYPERGLYCEMIA, UNSPECIFIED WHETHER LONG TERM INSULIN USE (HCC): ICD-10-CM

## 2019-01-07 LAB
ANION GAP SERPL CALCULATED.3IONS-SCNC: 5 MMOL/L (ref 4–13)
BUN SERPL-MCNC: 25 MG/DL (ref 5–25)
CALCIUM SERPL-MCNC: 8.7 MG/DL (ref 8.3–10.1)
CHLORIDE SERPL-SCNC: 103 MMOL/L (ref 100–108)
CO2 SERPL-SCNC: 29 MMOL/L (ref 21–32)
CREAT SERPL-MCNC: 1.18 MG/DL (ref 0.6–1.3)
ERYTHROCYTE [DISTWIDTH] IN BLOOD BY AUTOMATED COUNT: 14.6 % (ref 11.6–15.1)
GFR SERPL CREATININE-BSD FRML MDRD: 58 ML/MIN/1.73SQ M
GLUCOSE P FAST SERPL-MCNC: 262 MG/DL (ref 65–99)
GLUCOSE SERPL-MCNC: 262 MG/DL (ref 65–140)
HCT VFR BLD AUTO: 35.4 % (ref 34.8–46.1)
HGB BLD-MCNC: 10.9 G/DL (ref 11.5–15.4)
MCH RBC QN AUTO: 25.2 PG (ref 26.8–34.3)
MCHC RBC AUTO-ENTMCNC: 30.8 G/DL (ref 31.4–37.4)
MCV RBC AUTO: 82 FL (ref 82–98)
PLATELET # BLD AUTO: 266 THOUSANDS/UL (ref 149–390)
PMV BLD AUTO: 10.7 FL (ref 8.9–12.7)
POTASSIUM SERPL-SCNC: 4.7 MMOL/L (ref 3.5–5.3)
RBC # BLD AUTO: 4.33 MILLION/UL (ref 3.81–5.12)
SODIUM SERPL-SCNC: 137 MMOL/L (ref 136–145)
WBC # BLD AUTO: 7.39 THOUSAND/UL (ref 4.31–10.16)

## 2019-01-07 PROCEDURE — 85027 COMPLETE CBC AUTOMATED: CPT | Performed by: PHYSICIAN ASSISTANT

## 2019-01-07 PROCEDURE — 93458 L HRT ARTERY/VENTRICLE ANGIO: CPT | Performed by: INTERNAL MEDICINE

## 2019-01-07 PROCEDURE — 80048 BASIC METABOLIC PNL TOTAL CA: CPT | Performed by: PHYSICIAN ASSISTANT

## 2019-01-07 PROCEDURE — C1894 INTRO/SHEATH, NON-LASER: HCPCS | Performed by: INTERNAL MEDICINE

## 2019-01-07 PROCEDURE — 99152 MOD SED SAME PHYS/QHP 5/>YRS: CPT | Performed by: INTERNAL MEDICINE

## 2019-01-07 PROCEDURE — C1769 GUIDE WIRE: HCPCS | Performed by: INTERNAL MEDICINE

## 2019-01-07 RX ORDER — SODIUM CHLORIDE 9 MG/ML
100 INJECTION, SOLUTION INTRAVENOUS ONCE
Status: COMPLETED | OUTPATIENT
Start: 2019-01-07 | End: 2019-01-07

## 2019-01-07 RX ORDER — VERAPAMIL HYDROCHLORIDE 2.5 MG/ML
INJECTION, SOLUTION INTRAVENOUS CODE/TRAUMA/SEDATION MEDICATION
Status: COMPLETED | OUTPATIENT
Start: 2019-01-07 | End: 2019-01-07

## 2019-01-07 RX ORDER — HEPARIN SODIUM 1000 [USP'U]/ML
INJECTION, SOLUTION INTRAVENOUS; SUBCUTANEOUS CODE/TRAUMA/SEDATION MEDICATION
Status: COMPLETED | OUTPATIENT
Start: 2019-01-07 | End: 2019-01-07

## 2019-01-07 RX ORDER — MIDAZOLAM HYDROCHLORIDE 1 MG/ML
INJECTION INTRAMUSCULAR; INTRAVENOUS CODE/TRAUMA/SEDATION MEDICATION
Status: COMPLETED | OUTPATIENT
Start: 2019-01-07 | End: 2019-01-07

## 2019-01-07 RX ORDER — ACETAMINOPHEN 325 MG/1
650 TABLET ORAL EVERY 6 HOURS PRN
Status: DISCONTINUED | OUTPATIENT
Start: 2019-01-07 | End: 2019-01-08 | Stop reason: HOSPADM

## 2019-01-07 RX ORDER — NITROGLYCERIN 20 MG/100ML
INJECTION INTRAVENOUS CODE/TRAUMA/SEDATION MEDICATION
Status: COMPLETED | OUTPATIENT
Start: 2019-01-07 | End: 2019-01-07

## 2019-01-07 RX ORDER — FENTANYL CITRATE 50 UG/ML
INJECTION, SOLUTION INTRAMUSCULAR; INTRAVENOUS CODE/TRAUMA/SEDATION MEDICATION
Status: COMPLETED | OUTPATIENT
Start: 2019-01-07 | End: 2019-01-07

## 2019-01-07 RX ORDER — LIDOCAINE HYDROCHLORIDE 10 MG/ML
INJECTION, SOLUTION INFILTRATION; PERINEURAL CODE/TRAUMA/SEDATION MEDICATION
Status: COMPLETED | OUTPATIENT
Start: 2019-01-07 | End: 2019-01-07

## 2019-01-07 RX ORDER — SODIUM CHLORIDE 9 MG/ML
100 INJECTION, SOLUTION INTRAVENOUS CONTINUOUS
Status: DISPENSED | OUTPATIENT
Start: 2019-01-07 | End: 2019-01-07

## 2019-01-07 RX ORDER — ASPIRIN 81 MG/1
TABLET, CHEWABLE ORAL CODE/TRAUMA/SEDATION MEDICATION
Status: COMPLETED | OUTPATIENT
Start: 2019-01-07 | End: 2019-01-07

## 2019-01-07 RX ADMIN — MIDAZOLAM 2 MG: 1 INJECTION INTRAMUSCULAR; INTRAVENOUS at 13:53

## 2019-01-07 RX ADMIN — FENTANYL CITRATE 50 MCG: 50 INJECTION, SOLUTION INTRAMUSCULAR; INTRAVENOUS at 13:53

## 2019-01-07 RX ADMIN — SODIUM CHLORIDE 100 ML/HR: 0.9 INJECTION, SOLUTION INTRAVENOUS at 11:24

## 2019-01-07 RX ADMIN — HEPARIN SODIUM 4000 UNITS: 1000 INJECTION INTRAVENOUS; SUBCUTANEOUS at 14:10

## 2019-01-07 RX ADMIN — NITROGLYCERIN 200 MCG: 20 INJECTION INTRAVENOUS at 14:10

## 2019-01-07 RX ADMIN — IOHEXOL 85 ML: 350 INJECTION, SOLUTION INTRAVENOUS at 14:19

## 2019-01-07 RX ADMIN — LIDOCAINE HYDROCHLORIDE 1 ML: 10 INJECTION, SOLUTION INFILTRATION; PERINEURAL at 14:08

## 2019-01-07 RX ADMIN — VERAPAMIL HYDROCHLORIDE 2.5 MG: 2.5 INJECTION INTRAVENOUS at 14:11

## 2019-01-07 RX ADMIN — ASPIRIN 81 MG 324 MG: 81 TABLET ORAL at 13:42

## 2019-01-07 NOTE — H&P (VIEW-ONLY)
[address] [facility] [place]  [address]  [facility] [address] [facility]  [phone]    Cardiology Follow up    Patient:  Ann-Marie Maritns  :  1960  MRN:  [id number]    History of Present Illness:     57-year-old female with past medical history of diabetes since , positive nuclear stress test, neuropathy, hypertension with severe left ventricular hypertrophy, family history of cardiovascular disease in her mother who had sudden cardiac death at 66years old, aneurysmal ascending aorta measuring up to 4 3 cm, prominent main pulmonary artery at 3 5 cm, recently diagnosed reportedly mild sleep apnea not yet on CPAP, recently diagnosed asthma remote history of seizures, presents for follow-up       She denies any chest pain but does get some dyspnea on exertion and dizziness with standing  She has not had any palpitations, or syncope  She continues to work from home      Patient Active Problem List   Diagnosis    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, with long-term current use of insulin ([facility] [address] )    Hypertension    Seizures (HCC)    Exertional dyspnea    Enlarged pulmonary artery (HCC)    Aortic dilatation (HCC)    Anemia    Decreased pulses in feet    Diabetes mellitus type 2 with complications, uncontrolled (HCC)    Dyslipidemia    Fatigue    Hyperlipidemia    Microalbuminuria    Mild obstructive sleep apnea    Obesity    Peripheral neuropathy    Prolonged QT interval    Scalp avulsion    Visual impairment in both eyes    Vitamin D deficiency    Lung nodules    Orthostatic hypotension    Mild intermittent asthma with exacerbation       Past Surgical History  Past Surgical History:   Procedure Laterality Date    CATARACT EXTRACTION Bilateral     august and september    EYE SURGERY      HYSTERECTOMY         Social History   Social History     Social History    Marital status: /Civil Union     Spouse name: N/A    Number of children: N/A    Years of education: N/A     Occupational History    employed      Social History Main Topics    Smoking status: Never Smoker    Smokeless tobacco: Never Used    Alcohol use No    Drug use: No    Sexual activity: No     Other Topics Concern    Not on file     Social History Narrative    Caffeine use            Allergies   Allergen Reactions    2,4-D Dimethylamine (Amisol)      Patient unsure why this is here        Family History   Family History   Problem Relation Age of Onset    Heart attack Mother     Heart disease Mother     Hypertension Mother     No Known Problems Father     Heart disease Sister     No Known Problems Brother     No Known Problems Son     No Known Problems Daughter     Heart attack Maternal Grandmother     Diabetes Other     Heart attack Other        Review of Systems:  Review of Systems   Constitutional: Negative for chills, fatigue and fever  HENT: Negative for hearing loss, nosebleeds and tinnitus  Eyes: Negative for pain  Respiratory: Negative for cough, chest tightness and shortness of breath  Cardiovascular: Negative for chest pain, palpitations and leg swelling  Gastrointestinal: Negative for abdominal pain, nausea and vomiting  Endocrine: Negative for cold intolerance and heat intolerance  Genitourinary: Negative for difficulty urinating, hematuria and vaginal bleeding  Musculoskeletal: Negative for arthralgias  Skin: Negative for rash  Allergic/Immunologic: Negative for environmental allergies  Neurological: Negative for dizziness, syncope, weakness and light-headedness  Psychiatric/Behavioral: Negative for decreased concentration and sleep disturbance  The patient is not nervous/anxious            Current Outpatient Prescriptions:     albuterol (PROVENTIL HFA) 90 mcg/act inhaler, Inhale 2 puffs every 6 (six) hours as needed for wheezing For another 24 hours use every 4 hours standing, Disp: 6 7 g, Rfl: 0    amLODIPine (NORVASC) 10 mg tablet, Take 1 tablet by mouth daily, Disp: , Rfl:     aspirin 81 MG tablet, Take 1 tablet by mouth daily, Disp: , Rfl:     atorvastatin (LIPITOR) 40 mg tablet, TAKE 1 TABLET BY MOUTH  DAILY, Disp: 90 tablet, Rfl: 1    BASAGLAR KWIKPEN 100 units/mL injection pen, 43 Units, Disp: , Rfl: 5    SARAH MICROLET LANCETS lancets, Inject 1 applicator into the skin 4 (four) times a day, Disp: , Rfl:     Blood Pressure Monitor KIT, Check blood pressures BID, Disp: 1 each, Rfl: 0    budesonide-formoterol (SYMBICORT) 80-4 5 MCG/ACT inhaler, Inhale 2 puffs 2 (two) times a day Rinse mouth after use , Disp: 1 Inhaler, Rfl: 5    Cholecalciferol (VITAMIN D3) 3000 units TABS, Take by mouth, Disp: , Rfl:     CONTOUR NEXT TEST test strip, 4 (four) times a day Test blood sugar, Disp: , Rfl: 1    diltiazem (CARDIZEM CD) 180 mg 24 hr capsule, Take 1 capsule (180 mg total) by mouth daily, Disp: 30 capsule, Rfl: 5    gabapentin (NEURONTIN) 100 mg capsule, Take 1 capsule (100 mg total) by mouth 2 (two) times a day, Disp: 180 capsule, Rfl: 0    insulin lispro (HUMALOG KWIKPEN) 100 units/mL injection pen, Inject 10-18 Units under the skin 3 (three) times a day with meals 18 before breakfast 15 before lunch 20 before dinner plus sliding scale (Patient taking differently: Inject 10-18 Units under the skin 3 (three) times a day with meals 18 before breakfast 15 before lunch 20 before dinner plus sliding scale ), Disp: 5 pen, Rfl: 0    Insulin Pen Needle (BD PEN NEEDLE DERRICK U/F) 32G X 4 MM MISC, Inject 1 applicator under the skin 4 (four) times a day, Disp: , Rfl:     metFORMIN (GLUCOPHAGE) 500 mg tablet, TAKE 2 TABLETS BY MOUTH  DAILY WITH DINNER, Disp: 180 tablet, Rfl: 1    spironolactone (ALDACTONE) 25 mg tablet, TAKE 1 TABLET BY MOUTH  DAILY, Disp: 90 tablet, Rfl: 1     Physical Exam:    There were no vitals filed for this visit  Physical Exam   Constitutional: She is oriented to person, place, and time   She appears well-developed and well-nourished  HENT:   Head: Normocephalic  Right Ear: External ear normal    Left Ear: External ear normal    Mouth/Throat: Oropharynx is clear and moist    Eyes: Pupils are equal, round, and reactive to light  Neck: No JVD present  Carotid bruit is not present  Cardiovascular: Normal rate, regular rhythm and intact distal pulses  Exam reveals no gallop and no friction rub  No murmur heard  Pulmonary/Chest: Effort normal and breath sounds normal  No tachypnea  No respiratory distress  She has no wheezes  She has no rales  She exhibits no tenderness  Abdominal: Soft  She exhibits no distension  There is no tenderness  There is no rebound and no guarding  Musculoskeletal: She exhibits no edema  Neurological: She is alert and oriented to person, place, and time  Skin: Skin is warm and dry  Psychiatric: She has a normal mood and affect  Her behavior is normal  Judgment and thought content normal    Nursing note and vitals reviewed  Labs:not applicable    Assessment/Plan:    1  Positive stress test with dyspnea on exertion-I would like her to have a cardiac catheterization to follow the abnormal stress test     2  Hypertension-she will need angiotensin receptor blocker restarted in near future  We can also consider increasing the calcium channel blocker as she does have a dilated aorta  3  Dilated ascending aorta and dilated pulmonary artery-she will need follow up on this periodically  4  Obstructive sleep apnea not on CPAP-this is noted  We will see her back in 1 month after the cardiac catheterization      Thank you so much, please do not hesitate to contact me with any questions or concerns      Marielena Flores MD  12/20/2018  9:30 AM

## 2019-01-07 NOTE — DISCHARGE INSTRUCTIONS
Stop taking metformin for 2 days, may resume at prior dose on Thursday, 1/10/2019        After Radial Heart Catheterization   WHAT YOU NEED TO KNOW:   What will happen after a radial heart catheterization? · You will be attached to a heart monitor until you are fully awake  A heart monitor is an EKG that stays on continuously to record your heart's electrical activity  Healthcare providers will monitor your vital signs and pulses in your arm  They will frequently check your pressure bandage for bleeding or swelling  · You may have a band wrapped tightly around your wrist  The band puts pressure on your wound and helps prevent bleeding  A healthcare provider can put air into the band or remove air from the band  A healthcare provider will gradually remove air from the band and decrease pressure on your wrist  The band may be removed in 2 hours or when your wound stops bleeding  · You will need to keep your wrist straight for 2 to 4 hours  Do not  push or pull with your arm  Arm movements can cause serious bleeding  After you are monitored for several hours, you may go home or may need to stay in the hospital overnight  What do I need to know before I go home? · Care for your wound as directed  Remove the pressure bandage in 24 hours or as directed  Mild bruising is normal and expected  A small bandage can be placed on your wound after you remove the pressure bandage  Do not put powders, lotions, or creams on your wound  They may cause your wound to get infected  Monitor your wound every day for signs of infection, such as redness, swelling, or pus  · Shower the day after your procedure or as directed  Remove your pressure bandage before you shower  Do not take baths or go in hot tubs or pools  Carefully wash the wound with soap and water  Pat the area dry  A small bandage can be placed on your wound after you shower  · Apply firm, steady pressure to your wound if it bleeds    Apply pressure with a clean gauze or towel for 5 to 10 minutes  Call 911 if bleeding becomes heavy or does not stop  · Drink liquids as directed  Liquids will help flush the contrast liquid from your body  Ask how much liquid to drink each day and which liquids are best for you  · Do not lift anything heavier than 5 pounds until directed by your healthcare provider  Heavy lifting can put stress on your wound and cause bleeding  Do not push or pull with the arm that was used for the procedure  Do not do vigorous activity for at least 48 hours  Vigorous activity may cause bleeding from your wound  Rest and do quiet activities  Take short walks around the house to prevent a blood clot  Ask your healthcare provider when you can return to your normal activities  · Do not drive or return to work until your healthcare provider says it is okay  Your healthcare provider may tell you to wait 48 hours before you drive to decrease your risk for bleeding  You may not be able to return to work for at least 2 days after your procedure if your job involves heavy lifting  What medicines may I need? You may need any of the following:  · Blood thinners    help prevent blood clots  Examples of blood thinners include heparin and warfarin  Clots can cause strokes, heart attacks, and death  The following are general safety guidelines to follow while you are taking a blood thinner:    ¨ Watch for bleeding and bruising while you take blood thinners  Watch for bleeding from your gums or nose  Watch for blood in your urine and bowel movements  Use a soft washcloth on your skin, and a soft toothbrush to brush your teeth  This can keep your skin and gums from bleeding  If you shave, use an electric shaver  Do not play contact sports  ¨ Tell your dentist and other healthcare providers that you take anticoagulants  Wear a bracelet or necklace that says you take this medicine       ¨ Do not start or stop any medicines unless your healthcare provider tells you to  Many medicines cannot be used with blood thinners  ¨ Tell your healthcare provider right away if you forget to take the medicine, or if you take too much  ¨ Warfarin  is a blood thinner that you may need to take  The following are things you should be aware of if you take warfarin  § Foods and medicines can affect the amount of warfarin in your blood  Do not make major changes to your diet while you take warfarin  Warfarin works best when you eat about the same amount of vitamin K every day  Vitamin K is found in green leafy vegetables and certain other foods  Ask for more information about what to eat when you are taking warfarin  § You will need to see your healthcare provider for follow-up visits when you are on warfarin  You will need regular blood tests  These tests are used to decide how much medicine you need  · Acetaminophen  helps decrease pain and fever  This medicine is available without a doctor's order  Ask how much medicine is safe to take, and how often to take it  Acetaminophen can cause liver damage if not taken correctly  · Take your medicine as directed  Contact your healthcare provider if you think your medicine is not helping or if you have side effects  Tell him or her if you are allergic to any medicine  Keep a list of the medicines, vitamins, and herbs you take  Include the amounts, and when and why you take them  Bring the list or the pill bottles to follow-up visits  Carry your medicine list with you in case of an emergency    Call 911 for any of the following:   · You have any of the following signs of a heart attack:      ¨ Squeezing, pressure, or pain in your chest that lasts longer than 5 minutes or returns    ¨ Discomfort or pain in your back, neck, jaw, stomach, or arm     ¨ Trouble breathing    ¨ Nausea or vomiting    ¨ Lightheadedness or a sudden cold sweat, especially with chest pain or trouble breathing    · You have any of the following signs of a stroke:      ¨ Numbness or drooping on one side of your face     ¨ Weakness in an arm or leg    ¨ Confusion or difficulty speaking    ¨ Dizziness, a severe headache, or vision loss    · You feel lightheaded, short of breath, and have chest pain  · You cough up blood  · You have trouble breathing  · You cannot stop the bleeding from your wound even after you hold firm pressure for 10 minutes  When should I seek immediate care? · Blood soaks through your bandage  · Your stitches come apart  · Your hand or arm feels numb, cool, or looks pale  · Your wound gets swollen quickly  When should I contact my healthcare provider? · You have a fever or chills  · Your wound is red, swollen, or draining pus  · Your wound looks more bruised or you have new bruising on the side of your wrist      · You have nausea or are vomiting  · Your skin is itchy, swollen, or you have a rash  · You have questions or concerns about your condition or care  CARE AGREEMENT:   You have the right to help plan your care  Learn about your health condition and how it may be treated  Discuss treatment options with your caregivers to decide what care you want to receive  You always have the right to refuse treatment  The above information is an  only  It is not intended as medical advice for individual conditions or treatments  Talk to your doctor, nurse or pharmacist before following any medical regimen to see if it is safe and effective for you  © 2017 [employer] Information is for End User's use only and may not be sold, redistributed or otherwise used for commercial purposes  All illustrations and images included in CareNotes® are the copyrighted property of [employer]  or [employer]

## 2019-01-07 NOTE — INTERVAL H&P NOTE
Update: (This section must be completed if the H&P was completed greater than 24 hrs to procedure or admission)    H&P reviewed  After examining the patient, I find no changed to the H&P since it had been written  Patient re-evaluated   Accept as history and physical     Lucio Silva, DO/January 7, 2019/2:24 PM

## 2019-01-09 NOTE — CASE MANAGEMENT
-- Message is from the [facility]--    Reason for Call: Patient is calling on a status of mammogram/ultrasound orders. [facility] call     Caller Information       Type Contact Phone    01/05/2019 10:36 AM Phone (Incoming) Emani Carolina (Self) [phone] (H)    01/07/2019 10:39 AM Phone (Incoming) Emani Carolina (Self) [phone] (H)    01/09/2019 01:40 PM Phone (Incoming) Emani Carolina (Self) [phone] (H)          Alternative phone number:     Turnaround time given to caller:   \"This message will be sent to [state Provider's name]. The clinical team will fulfill your request as soon as they review your message when the office opens tomorrow.\"     Continued Stay Review    Date: 6/26/18    Vital signs:  Temp 96 8, Pulse 75, RR 16, /88, O2 sat  97% on Room Air    Abnormal Labs/Diagnostic Results:    Results from last 7 days  Lab Units 06/26/18  0502 06/25/18  0523 06/24/18  1107   WBC Thousand/uL 8 00 7 80 7 80   HEMOGLOBIN g/dL 11 9* 11 1* 12 3   HEMATOCRIT % 37 2 34 4* 37 7   MCV fL 80 79* 78*   PLATELETS Thousands/uL 311 295 308         Results from last 7 days  Lab Units 06/26/18  0502 06/25/18  0523 06/24/18  1108   SODIUM mmol/L 137 137 140   POTASSIUM mmol/L 3 9 3 8 3 8   CHLORIDE mmol/L 98 101 102   CO2 mmol/L 32* 30 30   ANION GAP mmol/L 7 6 8   BUN mg/dL 26* 26* 24   CREATININE mg/dL 0 89 0 88 1 01   CALCIUM mg/dL 9 8 9 3 9 6   BILIRUBIN TOTAL mg/dL  --  0 70 0 80   ALK PHOS U/L  --  132* 132*   ALT U/L  --  28 37   AST U/L  --  23 22   EGFR ml/min/1 73sq m 82 83 70   GLUCOSE RANDOM mg/dL 315* 247* 209*         Results from last 7 days  Lab Units 06/24/18  1108   TROPONIN I ng/mL <0 01         Results from last 7 days  Lab Units 06/24/18  1129   NT-PRO BNP pg/mL 90 3         Results from last 7 days  Lab Units 06/24/18  1108   D DIMER mg/L 0 54*         Results from last 7 days  Lab Units 06/26/18  1208 06/26/18  0531 06/25/18  1955 06/25/18  1550 06/25/18  1138 06/25/18  0538 06/24/18  2117 06/24/18  1715   POC GLUCOSE mg/dl 298* 297* 277* 284* 279* 240* 315* 247*         Results from last 7 days  Lab Units 06/25/18  0523   HEMOGLOBIN A1C % 11 1*         Results from last 7 days  Lab Units 06/24/18  1108   TSH 3RD GENERATON uIU/mL 1 680         Age/Sex: 59 y o  female     Assessment/Plan:     Jossy Tan is [age] y o  female who presented the hospital with chest discomfort and dizziness  She was found to be profoundly hypertensive  During hospitalization she was continued on her home medicines however she actually was found to have orthostatic hypotension  Her valsartan was decreased and HCTZ was discontinued    Due to concerns of elevated blood pressures after discharge as patient presented with profound hypertension, she will be restarted on her valsartan/HCT but at half dosing  She did complain of some cramping in hands which I advised to follow up with Neurology to establish care      Case was discussed with family member at time of discharge   All questions have been answered to satisfaction        Discharge Plan: Discharged 6/26/18

## 2019-01-10 ENCOUNTER — OFFICE VISIT (OUTPATIENT)
Dept: INTERNAL MEDICINE CLINIC | Facility: CLINIC | Age: 59
End: 2019-01-10
Payer: MEDICARE

## 2019-01-10 VITALS
DIASTOLIC BLOOD PRESSURE: 80 MMHG | HEIGHT: 69 IN | BODY MASS INDEX: 43.4 KG/M2 | HEART RATE: 69 BPM | TEMPERATURE: 97.9 F | OXYGEN SATURATION: 98 % | SYSTOLIC BLOOD PRESSURE: 150 MMHG | WEIGHT: 293 LBS

## 2019-01-10 DIAGNOSIS — Z00.00 MEDICARE ANNUAL WELLNESS VISIT, SUBSEQUENT: Primary | ICD-10-CM

## 2019-01-10 DIAGNOSIS — E66.01 CLASS 3 SEVERE OBESITY WITH SERIOUS COMORBIDITY AND BODY MASS INDEX (BMI) OF 45.0 TO 49.9 IN ADULT, UNSPECIFIED OBESITY TYPE (HCC): ICD-10-CM

## 2019-01-10 DIAGNOSIS — IMO0002 UNCONTROLLED TYPE 2 DIABETES MELLITUS WITH OPHTHALMIC COMPLICATION, WITH LONG-TERM CURRENT USE OF INSULIN: ICD-10-CM

## 2019-01-10 DIAGNOSIS — Z12.11 SCREENING FOR COLON CANCER: ICD-10-CM

## 2019-01-10 DIAGNOSIS — I10 ESSENTIAL HYPERTENSION: ICD-10-CM

## 2019-01-10 DIAGNOSIS — Z12.39 SCREENING FOR MALIGNANT NEOPLASM OF BREAST: ICD-10-CM

## 2019-01-10 DIAGNOSIS — G63 POLYNEUROPATHY ASSOCIATED WITH UNDERLYING DISEASE (HCC): ICD-10-CM

## 2019-01-10 PROCEDURE — G0439 PPPS, SUBSEQ VISIT: HCPCS | Performed by: INTERNAL MEDICINE

## 2019-01-10 PROCEDURE — 99214 OFFICE O/P EST MOD 30 MIN: CPT | Performed by: INTERNAL MEDICINE

## 2019-01-10 RX ORDER — INSULIN GLARGINE 100 [IU]/ML
50 INJECTION, SOLUTION SUBCUTANEOUS DAILY
Qty: 5 PEN | Refills: 1 | Status: SHIPPED | OUTPATIENT
Start: 2019-01-10 | End: 2019-02-01 | Stop reason: SDUPTHER

## 2019-01-10 RX ORDER — LOSARTAN POTASSIUM 25 MG/1
25 TABLET ORAL DAILY
Qty: 30 TABLET | Refills: 0 | Status: SHIPPED | OUTPATIENT
Start: 2019-01-10 | End: 2019-01-31 | Stop reason: SDUPTHER

## 2019-01-10 NOTE — PROGRESS NOTES
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, with long-term current use of insulin ([address] )    Hypertension    Seizures ([address] )    Exertional dyspnea    Enlarged pulmonary artery ([address] )    Aortic dilatation (HCC)    Anemia    Decreased pulses in feet    Diabetes mellitus type 2 with complications, uncontrolled ([address] )    Dyslipidemia    Fatigue    Hyperlipidemia    Microalbuminuria    Mild obstructive sleep apnea    Obesity    Peripheral neuropathy    Prolonged QT interval    Scalp avulsion    Visual impairment in both eyes    Vitamin D deficiency    Lung nodules    Orthostatic hypotension    Mild intermittent asthma with exacerbation     Past Medical History:   Diagnosis Date    Anemia     Arthritis     Diabetes mellitus ([address] )     Dyspnea on exertion     Hyperlipidemia     Hypertension     Legally blind 2010    Mild intermittent asthma with exacerbation 8/4/2018    Miscarriage     x 3    Seizures (HCC)     Sickle cell trait ([address] )      Past Surgical History:   Procedure Laterality Date    CATARACT EXTRACTION Bilateral     august and september    EYE SURGERY      HYSTERECTOMY       Family History   Problem Relation Age of Onset    Heart attack Mother     Heart disease Mother     Hypertension Mother     No Known Problems Father     Heart disease Sister     No Known Problems Brother     No Known Problems Son     No Known Problems Daughter     Heart attack Maternal Grandmother     Diabetes Other     Heart attack Other History   Smoking Status    Never Smoker   Smokeless Tobacco    Never Used     History   Alcohol Use No      History   Drug Use No       Current Outpatient Prescriptions   Medication Sig Dispense Refill    albuterol (PROVENTIL HFA) 90 mcg/act inhaler Inhale 2 puffs every 6 (six) hours as needed for wheezing For another 24 hours use every 4 hours standing 6 7 g 0    amLODIPine (NORVASC) 10 mg tablet Take 1 tablet by mouth daily      aspirin 81 MG tablet Take 1 tablet by mouth daily      atorvastatin (LIPITOR) 40 mg tablet TAKE 1 TABLET BY MOUTH  DAILY 90 tablet 1    BASAGLAR KWIKPEN 100 units/mL injection pen 43 Units  5    SARAH MICROLET LANCETS lancets Inject 1 applicator into the skin 4 (four) times a day      Blood Pressure Monitor KIT Check blood pressures BID 1 each 0    budesonide-formoterol (SYMBICORT) 80-4 5 MCG/ACT inhaler Inhale 2 puffs 2 (two) times a day Rinse mouth after use  1 Inhaler 5    Cholecalciferol (VITAMIN D3) 3000 units TABS Take by mouth      CONTOUR NEXT TEST test strip 4 (four) times a day Test blood sugar  1    diltiazem (CARDIZEM CD) 180 mg 24 hr capsule Take 1 capsule (180 mg total) by mouth daily 30 capsule 5    gabapentin (NEURONTIN) 100 mg capsule Take 1 capsule (100 mg total) by mouth 2 (two) times a day 180 capsule 0    insulin lispro (HUMALOG KWIKPEN) 100 units/mL injection pen Takes 18 units before meals  plus sliding scale 5 pen 2    Insulin Pen Needle (BD PEN NEEDLE DERRICK U/F) 32G X 4 MM MISC Inject 1 applicator under the skin 4 (four) times a day      metFORMIN (GLUCOPHAGE) 500 mg tablet TAKE 2 TABLETS BY MOUTH  DAILY WITH DINNER 180 tablet 1    spironolactone (ALDACTONE) 25 mg tablet TAKE 1 TABLET BY MOUTH  DAILY 90 tablet 1     No current facility-administered medications for this visit        Allergies   Allergen Reactions    2,4-D Dimethylamine (Amisol)      Patient unsure why this is here      Immunization History   Administered Date(s) Administered  Influenza 12/14/2014, 02/08/2018    Influenza Quadrivalent Preservative Free 3 years and older IM 02/08/2018    Influenza, recombinant, quadrivalent,injectable, preservative free 10/12/2018    Pneumococcal Polysaccharide PPV23 02/08/2018       Patient Care Team:  Toan Jordan MD as PCP - General (Internal Medicine)  Christoper Moritz, MD as PCP - Endocrinology (Endocrinology)  Christoper Moritz, MD    Medicare Screening Tests and Risk Assessments:  Ignacio Parkinson is here for her Subsequent Wellness visit  Health Risk Assessment:  Patient rates overall health as good  Patient feels that their physical health rating is Same  Eyesight was rated as Slightly worse  Hearing was rated as Same  Patient feels that their emotional and mental health rating is Same  Pain experienced by patient in the last 7 days has been None  Emotional/Mental Health:  Patient has been feeling nervous/anxious  PHQ-9 Depression Screening:    Frequency of the following problems over the past two weeks:      1  Little interest or pleasure in doing things: 0 - not at all      2  Feeling down, depressed, or hopeless: 0 - not at all  PHQ-2 Score: 0          Broken Bones/Falls: Fall Risk Assessment:    In the past year, patient has experienced: No history of falling in past year          Bladder/Bowel:  Patient has not leaked urine accidently in the last six months  Patient reports no loss of bowel control  Immunizations:  Patient has had a flu vaccination within the last year  Patient has received a pneumonia shot  Patient has not received a shingles shot  Home Safety:  Patient has trouble with stairs inside or outside of their home  Patient currently reports that there are safety hazards present in home , working smoke alarms, no working carbon monoxide detectors        Preventative Screenings:   No breast cancer screening performed, no colon cancer screen completed, cholesterol screen completed, glaucoma eye exam completed, Nutrition:  Current diet: Diabetic, Low Cholesterol, Low Saturated Fat, Limited junk food and No Added Salt with servings of the following:    Medications:  Patient is currently taking over-the-counter supplements  Patient is able to manage medications  Lifestyle Choices:  Patient reports no tobacco use  Patient has not smoked or used tobacco in the past   Patient reports no alcohol use  Patient does not drive a vehicle  Patient wears seat belt  Current level of exercise of physical activity described by patient as: Walk once or twice a week           Activities of Daily Living:  Can get out of bed by his or her self, able to dress self, able to make own meals, able to do own shopping, able to bathe self, can do own laundry/housekeeping, can manage own money, pay bills and track expenses    Previous Hospitalizations:  Hospitalization or ED visit in past 12 months  Number of hospitalizations within the last year: 3-4        Advanced Directives:  Patient has decided on a power of   Patient has spoken to designated power of   Patient has not completed advanced directive  Preventative Screening/Counseling:      Cardiovascular:      General: Risks and Benefits Discussed          Diabetes:      General: Risks and Benefits Discussed          Colorectal Cancer:      General: Risks and Benefits Discussed      Due for studies: Fecal Occult Blood          Breast Cancer:      General: Risks and Benefits Discussed          Cervical Cancer:      General: Risks and Benefits Discussed          Osteoporosis:      General: Risks and Benefits Discussed          AAA:      General: Screening Not Indicated          Glaucoma:      General: Screening Current          HIV:      General: Screening Not Indicated          Hepatitis C:      General: Screening Current        Advanced Directives:   Patient has no living will for healthcare, 5 wishes given       Immunizations:      Influenza: Influenza UTD This Year      Pneumococcal: Pneumococcal Due Today

## 2019-01-11 ENCOUNTER — HOSPITAL ENCOUNTER (OUTPATIENT)
Dept: MAMMOGRAPHY | Facility: HOSPITAL | Age: 59
Discharge: HOME | End: 2019-01-11
Payer: COMMERCIAL

## 2019-01-11 VITALS — BODY MASS INDEX: 43.4 KG/M2 | WEIGHT: 293 LBS | HEIGHT: 69 IN

## 2019-01-11 DIAGNOSIS — Z12.39 SCREENING FOR MALIGNANT NEOPLASM OF BREAST: ICD-10-CM

## 2019-01-11 PROCEDURE — 77067 SCR MAMMO BI INCL CAD: CPT

## 2019-01-11 NOTE — ASSESSMENT & PLAN NOTE
Not well control, will add losartan at a low dose with caution for orthostatic hypertension  She is already on spironolactone and amlodipine with Cardizem  Will monitor potassium closely, if potassium is higher will stop the spironolactone and increase the losartan dose

## 2019-01-11 NOTE — PROGRESS NOTES
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, with long-term current use of insulin ([facility])  Lab Results   Component Value Date    HGBA1C 10 7 (H) 01/03/2019       No results for input(s): POCGLU in the last 72 hours  Blood Sugar Average: Last 72 hrs:   uncontrolled, advised her to monitor closely and change regimen accordingly  Obesity  Likely the cause of her dyspnea on exertion, cardiac catheterization did not show any significant coronary vascular disease  Await CPAP study  Advised her to see weight management  Extensive dietary modifications needed along with regular exercise regimen  She may need surgical management since BMI is 46  Referred from mammography, fecal testing for colon cancer screening  Diagnoses and all orders for this visit:    Medicare annual wellness visit, subsequent    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, with long-term current use of insulin ([address] )  -     BASAGLAR KWIKPEN 100 units/mL injection pen; Inject 50 Units under the skin daily  -     Basic metabolic panel    Essential hypertension  -     losartan (COZAAR) 25 mg tablet; Take 1 tablet (25 mg total) by mouth daily  -     Basic metabolic panel    Polyneuropathy associated with underlying disease ([address] )    Class 3 severe obesity with serious comorbidity and body mass index (BMI) of 45 0 to 49 9 in adult, unspecified obesity type ([address] )  -     Ambulatory referral to Weight Management; Future    Screening for malignant neoplasm of breast  -     Mammo screening bilateral w cad;  Future    Screening for colon cancer  -     Occult Blood, Fecal Immunochemical Subjective:   Chief Complaint   Patient presents with    Medicare Wellness Visit        Patient ID: Santa Welsh is a 59 y o  female  She comes in for follow-up of hypertension, hyperlipidemia, pulmonary hypertension, diabetes, chronic dyspnea     BS continue to be elevated  Blood pressure has been somewhat elevated at home as well  Gets shift dizzy only when she stands up suddenly but no chronic dizziness  Gait is improved with PT  Continue to get short of breath with exerrtion  The following portions of the patient's history were reviewed and updated as appropriate: current medications, past medical history, past social history and past surgical history      PHQ-9 Depression Screening    PHQ-9:    Frequency of the following problems over the past two weeks:                Current Outpatient Prescriptions:     albuterol (PROVENTIL HFA) 90 mcg/act inhaler, Inhale 2 puffs every 6 (six) hours as needed for wheezing For another 24 hours use every 4 hours standing, Disp: 6 7 g, Rfl: 0    amLODIPine (NORVASC) 10 mg tablet, Take 1 tablet by mouth daily, Disp: , Rfl:     aspirin 81 MG tablet, Take 1 tablet by mouth daily, Disp: , Rfl:     atorvastatin (LIPITOR) 40 mg tablet, TAKE 1 TABLET BY MOUTH  DAILY, Disp: 90 tablet, Rfl: 1    BASAGLAR KWIKPEN 100 units/mL injection pen, Inject 50 Units under the skin daily, Disp: 5 pen, Rfl: 1    [employer] MICROLET LANCETS lancets, Inject 1 applicator into the skin 4 (four) times a day, Disp: , Rfl:     Blood Pressure Monitor KIT, Check blood pressures BID, Disp: 1 each, Rfl: 0    budesonide-formoterol (SYMBICORT) 80-4 5 MCG/ACT inhaler, Inhale 2 puffs 2 (two) times a day Rinse mouth after use , Disp: 1 Inhaler, Rfl: 5    Cholecalciferol (VITAMIN D3) 3000 units TABS, Take by mouth, Disp: , Rfl:     CONTOUR NEXT TEST test strip, 4 (four) times a day Test blood sugar, Disp: , Rfl: 1    diltiazem (CARDIZEM CD) 180 mg 24 hr capsule, Take 1 capsule (180 mg total) by mouth daily, Disp: 30 capsule, Rfl: 5    gabapentin (NEURONTIN) 100 mg capsule, Take 1 capsule (100 mg total) by mouth 2 (two) times a day, Disp: 180 capsule, Rfl: 0    insulin lispro (HUMALOG KWIKPEN) 100 units/mL injection pen, Takes 18 units before meals  plus sliding scale, Disp: 5 pen, Rfl: 2    Insulin Pen Needle (BD PEN NEEDLE DERRICK U/F) 32G X 4 MM MISC, Inject 1 applicator under the skin 4 (four) times a day, Disp: , Rfl:     metFORMIN (GLUCOPHAGE) 500 mg tablet, TAKE 2 TABLETS BY MOUTH  DAILY WITH DINNER, Disp: 180 tablet, Rfl: 1    spironolactone (ALDACTONE) 25 mg tablet, TAKE 1 TABLET BY MOUTH  DAILY, Disp: 90 tablet, Rfl: 1    losartan (COZAAR) 25 mg tablet, Take 1 tablet (25 mg total) by mouth daily, Disp: 30 tablet, Rfl: 0    Review of Systems   Constitutional: Positive for fatigue  Negative for fever and unexpected weight change  HENT: Negative for ear pain, hearing loss and sore throat  Eyes: Negative for pain and discharge  Respiratory: Negative for cough, chest tightness and shortness of breath  Cardiovascular: Negative for chest pain and palpitations  Gastrointestinal: Negative for abdominal pain, blood in stool, constipation, diarrhea and nausea  Genitourinary: Negative for dysuria, frequency and hematuria  Musculoskeletal: Positive for arthralgias  Negative for joint swelling  Skin: Negative for rash  Allergic/Immunologic: Negative for immunocompromised state  Neurological: Negative for dizziness and headaches  Hematological: Negative for adenopathy  Psychiatric/Behavioral: Negative for confusion and sleep disturbance  Objective:  /80   Pulse 69   Temp 97 9 °F (36 6 °C) (Tympanic)   Ht 5' 8 5" (1 74 m)   Wt (!) 141 kg (311 lb 12 8 oz)   SpO2 98%   BMI 46 72 kg/m²      Physical Exam   Constitutional: She appears well-developed and well-nourished  HENT:   Head: Normocephalic and atraumatic     Right Ear: Tympanic membrane normal    Left Ear: Tympanic membrane normal    Nose: Nose normal    Mouth/Throat: Oropharynx is clear and moist  No posterior oropharyngeal edema or posterior oropharyngeal erythema  Eyes: Pupils are equal, round, and reactive to light  Conjunctivae are normal  Right eye exhibits no discharge  Neck: Normal range of motion  Neck supple  No thyromegaly present  Cardiovascular: Normal rate, regular rhythm, S1 normal, S2 normal and normal heart sounds  PMI is not displaced  No murmur heard  Pulmonary/Chest: Effort normal and breath sounds normal  No accessory muscle usage  No apnea  No respiratory distress  She has no rhonchi  She has no rales  Abdominal: Soft  Normal appearance and bowel sounds are normal  She exhibits no shifting dullness  There is no hepatosplenomegaly  There is no tenderness  There is no rebound and no CVA tenderness  Musculoskeletal: Normal range of motion  She exhibits no edema or tenderness  Lymphadenopathy:     She has no cervical adenopathy  Neurological: She is alert  Skin: Skin is warm and intact  No rash noted  Psychiatric: She has a normal mood and affect  Her speech is normal    Nursing note and vitals reviewed  Recent Results (from the past 1008 hour(s))   HEMOGLOBIN A1C W/ EAG ESTIMATION    Collection Time: 01/03/19 11:19 AM   Result Value Ref Range    Hemoglobin A1C 10 7 (H) 4 2 - 6 3 %     mg/dl   Microalbumin / creatinine urine ratio    Collection Time: 01/03/19 11:19 AM   Result Value Ref Range    Creatinine, Ur 171 0 mg/dL    Microalbum  ,U,Random 539 0 (H) 0 0 - 20 0 mg/L    Microalb Creat Ratio 315 (H) 0 - 30 mg/g creatinine   TSH, 3rd generation    Collection Time: 01/03/19 11:19 AM   Result Value Ref Range    TSH 3RD GENERATON 1 680 0 465 - 4 680 uIU/mL   T4, free    Collection Time: 01/03/19 11:19 AM   Result Value Ref Range    Free T4 1 04 0 76 - 1 46 ng/dL   Vitamin D 25 hydroxy    Collection Time: 01/03/19 11:19 AM   Result Value Ref Range    Vit D, 25-Hydroxy 41 8 30 0 - 100 0 ng/mL   Lipid Panel with Direct LDL reflex    Collection Time: 01/03/19 11:19 AM   Result Value Ref Range    Cholesterol 158 <200 mg/dL    Triglycerides 93 <150 mg/dL    HDL, Direct 41 40 - 59 mg/dL    LDL Calculated 98 <130 mg/dL   CBC and differential    Collection Time: 01/03/19 11:19 AM   Result Value Ref Range    WBC 7 70 4 50 - 11 00 Thousand/uL    RBC 5 05 4 00 - 5 20 Million/uL    Hemoglobin 12 5 12 0 - 16 0 g/dL    Hematocrit 39 7 36 0 - 46 0 %    MCV 79 (L) 80 - 100 fL    MCH 24 7 (L) 26 0 - 34 0 pg    MCHC 31 4 31 0 - 36 0 g/dL    RDW 15 2 <15 3 %    MPV 9 5 8 9 - 12 7 fL    Platelets [phone] 849 Thousands/uL    Neutrophils Relative 65 45 - 65 %    Lymphocytes Relative 27 25 - 45 %    Monocytes Relative 4 1 - 10 %    Eosinophils Relative 3 0 - 6 %    Basophils Relative 1 0 - 1 %    Neutrophils Absolute 5 00 1 80 - 7 80 Thousands/µL    Lymphocytes Absolute 2 10 0 50 - 4 00 Thousands/µL    Monocytes Absolute 0 30 0 20 - 0 90 Thousand/µL    Eosinophils Absolute 0 30 0 00 - 0 40 Thousand/µL    Basophils Absolute 0 10 0 00 - 0 10 Thousands/µL   Protime-INR    Collection Time: 01/03/19 11:19 AM   Result Value Ref Range    Protime 10 3 9 5 - 11 6 seconds    INR 0 97 0 89 - 1 10   Comprehensive metabolic panel    Collection Time: 01/03/19 11:19 AM   Result Value Ref Range    Sodium 137 137 - 147 mmol/L    Potassium 4 9 3 6 - 5 0 mmol/L    Chloride 99 97 - 108 mmol/L    CO2 30 22 - 30 mmol/L    ANION GAP 8 5 - 14 mmol/L    BUN 21 5 - 25 mg/dL    Creatinine 0 94 0 60 - 1 20 mg/dL    Glucose, Fasting 286 (H) 70 - 99 mg/dL    Calcium 9 7 8 4 - 10 2 mg/dL    AST 20 14 - 36 U/L    ALT 23 9 - 52 U/L    Alkaline Phosphatase 159 (H) 43 - 122 U/L    Total Protein 7 3 5 9 - 8 4 g/dL    Albumin 4 0 3 0 - 5 2 g/dL    Total Bilirubin 0 60 <1 30 mg/dL    eGFR 76 >60 ml/min/1 73sq m   Lipoprotein NMR    Collection Time: 01/03/19 11:19 AM   Result Value Ref Range    LDL-P 1183 (H) <1000 nmol/L Total LDL-Chol 84 0 - 99 mg/dL    HDL Cholesterol 46 >39 mg/dL    Cholesterol, Total 149 100 - 199 mg/dL    HDL-P(Total) 32 6 >=30 5 umol/L    LDL Size 21 3 >20 5 nm    Small LDL-P 436 <=527 nmol/L    LP-IR Score 64 (H) <=45    Triglycerides 96 0 - 149 mg/dL   Smear Review(Phlebs Do Not Order)    Collection Time: 01/03/19 11:19 AM   Result Value Ref Range    RBC Morphology abnormal     Anisocytosis Present     Microcytes Present     Poikilocytes Present     Platelet Estimate Adequate Adequate   Basic metabolic panel    Collection Time: 01/07/19 11:18 AM   Result Value Ref Range    Sodium 137 136 - 145 mmol/L    Potassium 4 7 3 5 - 5 3 mmol/L    Chloride 103 100 - 108 mmol/L    CO2 29 21 - 32 mmol/L    ANION GAP 5 4 - 13 mmol/L    BUN 25 5 - 25 mg/dL    Creatinine 1 18 0 60 - 1 30 mg/dL    Glucose 262 (H) 65 - 140 mg/dL    Glucose, Fasting 262 (H) 65 - 99 mg/dL    Calcium 8 7 8 3 - 10 1 mg/dL    eGFR 58 ml/min/1 73sq m   CBC    Collection Time: 01/07/19 11:18 AM   Result Value Ref Range    WBC 7 39 4 31 - 10 16 Thousand/uL    RBC 4 33 3 81 - 5 12 Million/uL    Hemoglobin 10 9 (L) 11 5 - 15 4 g/dL    Hematocrit 35 4 34 8 - 46 1 %    MCV 82 82 - 98 fL    MCH 25 2 (L) 26 8 - 34 3 pg    MCHC 30 8 (L) 31 4 - 37 4 g/dL    RDW 14 6 11 6 - 15 1 %    Platelets 791 003 - 463 Thousands/uL    MPV 10 7 8 9 - 12 7 fL   ]    No results found

## 2019-01-11 NOTE — ASSESSMENT & PLAN NOTE
Likely the cause of her dyspnea on exertion, cardiac catheterization did not show any significant coronary vascular disease  Await CPAP study  Advised her to see weight management  Extensive dietary modifications needed along with regular exercise regimen  She may need surgical management since BMI is 46

## 2019-01-11 NOTE — ASSESSMENT & PLAN NOTE
Lab Results   Component Value Date    HGBA1C 10 7 (H) 01/03/2019       No results for input(s): POCGLU in the last 72 hours  Blood Sugar Average: Last 72 hrs:   uncontrolled, advised her to monitor closely and change regimen accordingly

## 2019-01-14 DIAGNOSIS — E11.42 DIABETIC POLYNEUROPATHY ASSOCIATED WITH TYPE 2 DIABETES MELLITUS (HCC): ICD-10-CM

## 2019-01-14 RX ORDER — GABAPENTIN 100 MG/1
100 CAPSULE ORAL 2 TIMES DAILY
Qty: 180 CAPSULE | Refills: 1 | Status: SHIPPED | OUTPATIENT
Start: 2019-01-14 | End: 2019-11-08 | Stop reason: SDUPTHER

## 2019-01-17 ENCOUNTER — TRANSCRIBE ORDERS (OUTPATIENT)
Dept: MAMMOGRAPHY | Facility: CLINIC | Age: 59
End: 2019-01-17

## 2019-01-17 ENCOUNTER — HOSPITAL ENCOUNTER (OUTPATIENT)
Dept: MAMMOGRAPHY | Facility: CLINIC | Age: 59
Discharge: HOME | End: 2019-01-17
Payer: COMMERCIAL

## 2019-01-17 ENCOUNTER — HOSPITAL ENCOUNTER (OUTPATIENT)
Dept: ULTRASOUND IMAGING | Facility: CLINIC | Age: 59
Discharge: HOME | End: 2019-01-17
Payer: COMMERCIAL

## 2019-01-17 VITALS — BODY MASS INDEX: 43.4 KG/M2 | HEIGHT: 69 IN | WEIGHT: 293 LBS

## 2019-01-17 DIAGNOSIS — R92.8 ABNORMAL MAMMOGRAM: ICD-10-CM

## 2019-01-17 DIAGNOSIS — R92.8 ABNORMAL MAMMOGRAM: Primary | ICD-10-CM

## 2019-01-17 PROCEDURE — 76642 ULTRASOUND BREAST LIMITED: CPT

## 2019-01-17 PROCEDURE — 77066 DX MAMMO INCL CAD BI: CPT

## 2019-01-17 PROCEDURE — G0279 TOMOSYNTHESIS, MAMMO: HCPCS

## 2019-01-21 ENCOUNTER — TELEPHONE (OUTPATIENT)
Dept: INTERNAL MEDICINE CLINIC | Facility: CLINIC | Age: 59
End: 2019-01-21

## 2019-01-21 NOTE — TELEPHONE ENCOUNTER
----- Message from Nan Lakhani MD sent at 1/17/2019 11:36 AM EST -----  nothinh worrisome on the US and mammo, Ill discuss in detail when she comes for follow up   The breast center probably already told her that but want to make sure she doesn't wait all weekend worrying about the result

## 2019-01-28 ENCOUNTER — OFFICE VISIT (OUTPATIENT)
Dept: CARDIOLOGY CLINIC | Facility: CLINIC | Age: 59
End: 2019-01-28
Payer: COMMERCIAL

## 2019-01-28 VITALS
BODY MASS INDEX: 43.4 KG/M2 | SYSTOLIC BLOOD PRESSURE: 156 MMHG | WEIGHT: 293 LBS | DIASTOLIC BLOOD PRESSURE: 90 MMHG | RESPIRATION RATE: 18 BRPM | HEIGHT: 69 IN | HEART RATE: 88 BPM

## 2019-01-28 DIAGNOSIS — I10 ESSENTIAL HYPERTENSION: ICD-10-CM

## 2019-01-28 PROCEDURE — 99214 OFFICE O/P EST MOD 30 MIN: CPT | Performed by: INTERNAL MEDICINE

## 2019-01-28 RX ORDER — CHLORTHALIDONE 25 MG/1
12.5 TABLET ORAL DAILY
Qty: 15 TABLET | Refills: 5 | Status: SHIPPED | OUTPATIENT
Start: 2019-01-28 | End: 2019-03-14 | Stop reason: SDUPTHER

## 2019-01-28 RX ORDER — DILTIAZEM HYDROCHLORIDE 360 MG/1
360 CAPSULE, EXTENDED RELEASE ORAL DAILY
Qty: 30 CAPSULE | Refills: 5 | Status: SHIPPED | OUTPATIENT
Start: 2019-01-28 | End: 2019-06-20

## 2019-01-28 NOTE — LETTER
2019     Vickie Larios, [address]    Patient: Ann-Marie Martins   YOB: 1960   Date of Visit: 2019       Dear Dr Guillermina Mendoza:    Thank you for referring Naidu Delrodger to me for evaluation  Below are my notes for this consultation  If you have questions, please do not hesitate to call me  I look forward to following your patient along with you  Sincerely,        Del Schilder, MD        CC: No Recipients  Del Schilder, MD  2019 10:45 AM  Sign at close encounter  [address] Cardiology [facility] AT [facility], [address] [facility]  [phone]    Cardiology Follow up    Patient:  Ann-Marie Martins  :  1960  MRN:  [id number]    History of Present Illness:     57-year-old female with past medical history of diabetes since , normal coronary arteries from 2018, neuropathy, hypertension with severe left ventricular hypertrophy, family history of cardiovascular disease in her mother who had sudden cardiac death at 66years old, aneurysmal ascending aorta measuring up to 4 3 cm, prominent main pulmonary artery at 3 5 cm, recently diagnosed reportedly mild sleep apnea not yet on CPAP, recently diagnosed asthma, remote history of seizures, presents for follow-up  She notes her asthma has been acting up over the past few weeks  She has not had any chest pain  She notes no palpitations and has not had dizziness or syncope  She does notice a bump on her right arm which started few days ago      Patient Active Problem List   Diagnosis    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, with long-term current use of insulin ([address] )    Hypertension    Seizures (HCC)    Exertional dyspnea    Enlarged pulmonary artery (HCC)    Aortic dilatation (HCC)    Anemia    Decreased pulses in feet    Diabetes mellitus type 2 with complications, uncontrolled (HCC)    Dyslipidemia    Fatigue    Hyperlipidemia    Microalbuminuria    Mild obstructive sleep apnea    Obesity    Peripheral neuropathy    Prolonged QT interval    Scalp avulsion    Visual impairment in both eyes    Vitamin D deficiency    Lung nodules    Orthostatic hypotension    Mild intermittent asthma with exacerbation       Past Surgical History  Past Surgical History:   Procedure Laterality Date    CATARACT EXTRACTION Bilateral     august and september    EYE SURGERY      HYSTERECTOMY      44    OOPHORECTOMY Left     44       Social History   Social History     Social History    Marital status: /Civil Union     Spouse name: N/A    Number of children: N/A    Years of education: N/A     Occupational History    employed      Social History Main Topics    Smoking status: Never Smoker    Smokeless tobacco: Never Used    Alcohol use No    Drug use: No    Sexual activity: No     Other Topics Concern    Not on file     Social History Narrative    Caffeine use            Allergies   Allergen Reactions    2,4-D Dimethylamine (Amisol)      Patient unsure why this is here        Family History   Family History   Problem Relation Age of Onset    Heart attack Mother     Heart disease Mother     Hypertension Mother     No Known Problems Father     Heart disease Sister     No Known Problems Brother     No Known Problems Son     No Known Problems Daughter     Heart attack Maternal Grandmother     Diabetes Other     Heart attack Other        Review of Systems:  Review of Systems   Constitutional: Negative for chills, fatigue and fever  HENT: Negative for hearing loss, nosebleeds and tinnitus  Eyes: Negative for pain  Respiratory: Positive for shortness of breath  Negative for cough and chest tightness  Cardiovascular: Negative for chest pain, palpitations and leg swelling  Gastrointestinal: Negative for abdominal pain, nausea and vomiting  Endocrine: Negative for cold intolerance and heat intolerance     Genitourinary: Negative for difficulty urinating, hematuria and vaginal bleeding  Musculoskeletal: Negative for arthralgias  Skin: Negative for rash  Allergic/Immunologic: Negative for environmental allergies  Neurological: Negative for dizziness, syncope, weakness and light-headedness  Psychiatric/Behavioral: Negative for decreased concentration and sleep disturbance  The patient is not nervous/anxious            Current Outpatient Prescriptions:     albuterol (PROVENTIL HFA) 90 mcg/act inhaler, Inhale 2 puffs every 6 (six) hours as needed for wheezing For another 24 hours use every 4 hours standing, Disp: 6 7 g, Rfl: 0    amLODIPine (NORVASC) 10 mg tablet, Take 1 tablet by mouth daily, Disp: , Rfl:     aspirin 81 MG tablet, Take 1 tablet by mouth daily, Disp: , Rfl:     atorvastatin (LIPITOR) 40 mg tablet, TAKE 1 TABLET BY MOUTH  DAILY, Disp: 90 tablet, Rfl: 1    BASAGLAR KWIKPEN 100 units/mL injection pen, Inject 50 Units under the skin daily, Disp: 5 pen, Rfl: 1    SARAH MICROLET LANCETS lancets, Inject 1 applicator into the skin 4 (four) times a day, Disp: , Rfl:     Blood Pressure Monitor KIT, Check blood pressures BID, Disp: 1 each, Rfl: 0    budesonide-formoterol (SYMBICORT) 80-4 5 MCG/ACT inhaler, Inhale 2 puffs 2 (two) times a day Rinse mouth after use , Disp: 1 Inhaler, Rfl: 5    Cholecalciferol (VITAMIN D3) 3000 units TABS, Take by mouth, Disp: , Rfl:     CONTOUR NEXT TEST test strip, 4 (four) times a day Test blood sugar, Disp: , Rfl: 1    diltiazem (CARDIZEM CD) 180 mg 24 hr capsule, Take 1 capsule (180 mg total) by mouth daily, Disp: 30 capsule, Rfl: 5    gabapentin (NEURONTIN) 100 mg capsule, Take 1 capsule (100 mg total) by mouth 2 (two) times a day, Disp: 180 capsule, Rfl: 1    insulin lispro (HUMALOG KWIKPEN) 100 units/mL injection pen, Takes 18 units before meals  plus sliding scale, Disp: 5 pen, Rfl: 2    Insulin Pen Needle (BD PEN NEEDLE DERRICK U/F) 32G X 4 MM MISC, Inject 1 applicator under the skin 4 (four) times a day, Disp: , Rfl:     losartan (COZAAR) 25 mg tablet, Take 1 tablet (25 mg total) by mouth daily, Disp: 30 tablet, Rfl: 0    metFORMIN (GLUCOPHAGE) 500 mg tablet, TAKE 2 TABLETS BY MOUTH  DAILY WITH DINNER, Disp: 180 tablet, Rfl: 1    spironolactone (ALDACTONE) 25 mg tablet, TAKE 1 TABLET BY MOUTH  DAILY, Disp: 90 tablet, Rfl: 1     Physical Exam:    Vitals:    01/28/19 1001   BP: 156/90   Pulse: 88   Resp: 18   Weight: (!) 142 kg (312 lb 9 6 oz)   Height: 5' 8 5" (1 74 m)       Physical Exam   Constitutional: She is oriented to person, place, and time  She appears well-developed and well-nourished  HENT:   Head: Normocephalic  Right Ear: External ear normal    Left Ear: External ear normal    Mouth/Throat: Oropharynx is clear and moist    Eyes: Pupils are equal, round, and reactive to light  Neck: No JVD present  Carotid bruit is not present  Cardiovascular: Normal rate, regular rhythm and intact distal pulses  Exam reveals no gallop and no friction rub  No murmur heard  Pulmonary/Chest: Effort normal and breath sounds normal  No tachypnea  No respiratory distress  She has no wheezes  She has no rales  She exhibits no tenderness  Abdominal: Soft  She exhibits no distension  There is no tenderness  There is no rebound and no guarding  Musculoskeletal: She exhibits no edema  Neurological: She is alert and oriented to person, place, and time  Skin: Skin is warm and dry  Psychiatric: She has a normal mood and affect  Her behavior is normal  Judgment and thought content normal    Nursing note and vitals reviewed  Labs:not applicable    Assessment/Plan:    1  Hypertension  This is not controlled  I would like to add chlorthalidone 12 5 mg daily and check blood test about 2 weeks later      In the future would like to increased diltiazem but will hold off on this until she is treated for sleep apnea given the potential for sinus bradycardia and pauses  2  Dilated ascending aorta and dilated pulmonary artery  Will follow-up on this in the future  3  Obstructive sleep apnea not on CPAP  I encouraged her to follow-up for CPAP initiation  4  Dyslipidemia-she is on aspirin and statin  I would see her back in about a month and will follow up on her pressure at that time  Thank you so much, please do not hesitate to contact me with any questions or concerns        Arleen Laws MD  1/28/2019  10:23 AM

## 2019-01-28 NOTE — PROGRESS NOTES
[address] Cardiology [place]  [address]  [facility], 98 [facility]  [phone]    Cardiology Follow up    Patient:  Jayson Norton  :  1960  MRN:  [id number]    History of Present Illness:     49-year-old female with past medical history of diabetes since , normal coronary arteries from 2018, neuropathy, hypertension with severe left ventricular hypertrophy, family history of cardiovascular disease in her mother who had sudden cardiac death at 66years old, aneurysmal ascending aorta measuring up to 4 3 cm, prominent main pulmonary artery at 3 5 cm, recently diagnosed reportedly mild sleep apnea not yet on CPAP, recently diagnosed asthma, remote history of seizures, presents for follow-up  She notes her asthma has been acting up over the past few weeks  She has not had any chest pain  She notes no palpitations and has not had dizziness or syncope  She does notice a bump on her right arm which started few days ago      Patient Active Problem List   Diagnosis    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, with long-term current use of insulin ([facility] [address] )    Hypertension    Seizures (HCC)    Exertional dyspnea    Enlarged pulmonary artery (HCC)    Aortic dilatation (HCC)    Anemia    Decreased pulses in feet    Diabetes mellitus type 2 with complications, uncontrolled (HCC)    Dyslipidemia    Fatigue    Hyperlipidemia    Microalbuminuria    Mild obstructive sleep apnea    Obesity    Peripheral neuropathy    Prolonged QT interval    Scalp avulsion    Visual impairment in both eyes    Vitamin D deficiency    Lung nodules    Orthostatic hypotension    Mild intermittent asthma with exacerbation       Past Surgical History  Past Surgical History:   Procedure Laterality Date    CATARACT EXTRACTION Bilateral     august and september    EYE SURGERY      HYSTERECTOMY      39    OOPHORECTOMY Left     44       Social History   Social History     Social History    Marital status: /Civil Union     Spouse name: N/A    Number of children: N/A    Years of education: N/A     Occupational History    employed      Social History Main Topics    Smoking status: Never Smoker    Smokeless tobacco: Never Used    Alcohol use No    Drug use: No    Sexual activity: No     Other Topics Concern    Not on file     Social History Narrative    Caffeine use            Allergies   Allergen Reactions    2,4-D Dimethylamine (Amisol)      Patient unsure why this is here        Family History   Family History   Problem Relation Age of Onset    Heart attack Mother     Heart disease Mother     Hypertension Mother     No Known Problems Father     Heart disease Sister     No Known Problems Brother     No Known Problems Son     No Known Problems Daughter     Heart attack Maternal Grandmother     Diabetes Other     Heart attack Other        Review of Systems:  Review of Systems   Constitutional: Negative for chills, fatigue and fever  HENT: Negative for hearing loss, nosebleeds and tinnitus  Eyes: Negative for pain  Respiratory: Positive for shortness of breath  Negative for cough and chest tightness  Cardiovascular: Negative for chest pain, palpitations and leg swelling  Gastrointestinal: Negative for abdominal pain, nausea and vomiting  Endocrine: Negative for cold intolerance and heat intolerance  Genitourinary: Negative for difficulty urinating, hematuria and vaginal bleeding  Musculoskeletal: Negative for arthralgias  Skin: Negative for rash  Allergic/Immunologic: Negative for environmental allergies  Neurological: Negative for dizziness, syncope, weakness and light-headedness  Psychiatric/Behavioral: Negative for decreased concentration and sleep disturbance  The patient is not nervous/anxious            Current Outpatient Prescriptions:     albuterol (PROVENTIL HFA) 90 mcg/act inhaler, Inhale 2 puffs every 6 (six) hours as needed for wheezing For another 24 hours use every 4 hours standing, Disp: 6 7 g, Rfl: 0    amLODIPine (NORVASC) 10 mg tablet, Take 1 tablet by mouth daily, Disp: , Rfl:     aspirin 81 MG tablet, Take 1 tablet by mouth daily, Disp: , Rfl:     atorvastatin (LIPITOR) 40 mg tablet, TAKE 1 TABLET BY MOUTH  DAILY, Disp: 90 tablet, Rfl: 1    BASAGLAR KWIKPEN 100 units/mL injection pen, Inject 50 Units under the skin daily, Disp: 5 pen, Rfl: 1    SARAH MICROLET LANCETS lancets, Inject 1 applicator into the skin 4 (four) times a day, Disp: , Rfl:     Blood Pressure Monitor KIT, Check blood pressures BID, Disp: 1 each, Rfl: 0    budesonide-formoterol (SYMBICORT) 80-4 5 MCG/ACT inhaler, Inhale 2 puffs 2 (two) times a day Rinse mouth after use , Disp: 1 Inhaler, Rfl: 5    Cholecalciferol (VITAMIN D3) 3000 units TABS, Take by mouth, Disp: , Rfl:     CONTOUR NEXT TEST test strip, 4 (four) times a day Test blood sugar, Disp: , Rfl: 1    diltiazem (CARDIZEM CD) 180 mg 24 hr capsule, Take 1 capsule (180 mg total) by mouth daily, Disp: 30 capsule, Rfl: 5    gabapentin (NEURONTIN) 100 mg capsule, Take 1 capsule (100 mg total) by mouth 2 (two) times a day, Disp: 180 capsule, Rfl: 1    insulin lispro (HUMALOG KWIKPEN) 100 units/mL injection pen, Takes 18 units before meals  plus sliding scale, Disp: 5 pen, Rfl: 2    Insulin Pen Needle (BD PEN NEEDLE DERRICK U/F) 32G X 4 MM MISC, Inject 1 applicator under the skin 4 (four) times a day, Disp: , Rfl:     losartan (COZAAR) 25 mg tablet, Take 1 tablet (25 mg total) by mouth daily, Disp: 30 tablet, Rfl: 0    metFORMIN (GLUCOPHAGE) 500 mg tablet, TAKE 2 TABLETS BY MOUTH  DAILY WITH DINNER, Disp: 180 tablet, Rfl: 1    spironolactone (ALDACTONE) 25 mg tablet, TAKE 1 TABLET BY MOUTH  DAILY, Disp: 90 tablet, Rfl: 1     Physical Exam:    Vitals:    01/28/19 1001   BP: 156/90   Pulse: 88   Resp: 18   Weight: (!) 142 kg (312 lb 9 6 oz)   Height: 5' 8 5" (1 74 m)       Physical Exam Constitutional: She is oriented to person, place, and time  She appears well-developed and well-nourished  HENT:   Head: Normocephalic  Right Ear: External ear normal    Left Ear: External ear normal    Mouth/Throat: Oropharynx is clear and moist    Eyes: Pupils are equal, round, and reactive to light  Neck: No JVD present  Carotid bruit is not present  Cardiovascular: Normal rate, regular rhythm and intact distal pulses  Exam reveals no gallop and no friction rub  No murmur heard  Pulmonary/Chest: Effort normal and breath sounds normal  No tachypnea  No respiratory distress  She has no wheezes  She has no rales  She exhibits no tenderness  Abdominal: Soft  She exhibits no distension  There is no tenderness  There is no rebound and no guarding  Musculoskeletal: She exhibits no edema  Neurological: She is alert and oriented to person, place, and time  Skin: Skin is warm and dry  Psychiatric: She has a normal mood and affect  Her behavior is normal  Judgment and thought content normal    Nursing note and vitals reviewed  Labs:not applicable    Assessment/Plan:    1  Hypertension  This is not controlled  I would like to add chlorthalidone 12 5 mg daily and check blood test about 2 weeks later  In the future would like to increased diltiazem but will hold off on this until she is treated for sleep apnea given the potential for sinus bradycardia and pauses  2  Dilated ascending aorta and dilated pulmonary artery  Will follow-up on this in the future  3  Obstructive sleep apnea not on CPAP  I encouraged her to follow-up for CPAP initiation  4  Dyslipidemia-she is on aspirin and statin  I would see her back in about a month and will follow up on her pressure at that time  Thank you so much, please do not hesitate to contact me with any questions or concerns        Ana River MD  1/28/2019  10:23 AM

## 2019-01-31 DIAGNOSIS — I10 ESSENTIAL HYPERTENSION: ICD-10-CM

## 2019-01-31 RX ORDER — LOSARTAN POTASSIUM 25 MG/1
25 TABLET ORAL DAILY
Qty: 30 TABLET | Refills: 0 | Status: SHIPPED | OUTPATIENT
Start: 2019-01-31 | End: 2019-02-01 | Stop reason: SDUPTHER

## 2019-02-01 ENCOUNTER — OFFICE VISIT (OUTPATIENT)
Dept: ENDOCRINOLOGY | Facility: CLINIC | Age: 59
End: 2019-02-01
Payer: COMMERCIAL

## 2019-02-01 VITALS
DIASTOLIC BLOOD PRESSURE: 80 MMHG | WEIGHT: 293 LBS | HEIGHT: 69 IN | SYSTOLIC BLOOD PRESSURE: 146 MMHG | HEART RATE: 94 BPM | BODY MASS INDEX: 43.4 KG/M2

## 2019-02-01 DIAGNOSIS — Z79.4 TYPE 2 DIABETES MELLITUS WITH MICROALBUMINURIA, WITH LONG-TERM CURRENT USE OF INSULIN (HCC): ICD-10-CM

## 2019-02-01 DIAGNOSIS — I10 ESSENTIAL HYPERTENSION: ICD-10-CM

## 2019-02-01 DIAGNOSIS — IMO0002 UNCONTROLLED TYPE 2 DIABETES MELLITUS WITH OPHTHALMIC COMPLICATION, WITH LONG-TERM CURRENT USE OF INSULIN: Primary | ICD-10-CM

## 2019-02-01 DIAGNOSIS — E66.01 CLASS 3 SEVERE OBESITY WITH SERIOUS COMORBIDITY AND BODY MASS INDEX (BMI) OF 45.0 TO 49.9 IN ADULT, UNSPECIFIED OBESITY TYPE (HCC): ICD-10-CM

## 2019-02-01 DIAGNOSIS — R80.9 TYPE 2 DIABETES MELLITUS WITH MICROALBUMINURIA, WITH LONG-TERM CURRENT USE OF INSULIN (HCC): ICD-10-CM

## 2019-02-01 DIAGNOSIS — E78.5 HYPERLIPIDEMIA, UNSPECIFIED HYPERLIPIDEMIA TYPE: ICD-10-CM

## 2019-02-01 DIAGNOSIS — E11.29 TYPE 2 DIABETES MELLITUS WITH MICROALBUMINURIA, WITH LONG-TERM CURRENT USE OF INSULIN (HCC): ICD-10-CM

## 2019-02-01 DIAGNOSIS — E55.9 VITAMIN D DEFICIENCY: ICD-10-CM

## 2019-02-01 PROCEDURE — 99215 OFFICE O/P EST HI 40 MIN: CPT | Performed by: INTERNAL MEDICINE

## 2019-02-01 RX ORDER — INSULIN GLARGINE 100 [IU]/ML
INJECTION, SOLUTION SUBCUTANEOUS
Qty: 5 PEN | Refills: 3 | Status: SHIPPED | OUTPATIENT
Start: 2019-02-01 | End: 2019-02-14

## 2019-02-01 RX ORDER — LOSARTAN POTASSIUM 25 MG/1
50 TABLET ORAL DAILY
Qty: 30 TABLET | Refills: 6 | Status: SHIPPED | OUTPATIENT
Start: 2019-02-01 | End: 2019-03-13 | Stop reason: SDUPTHER

## 2019-02-01 NOTE — PATIENT INSTRUCTIONS
Hypoglycemia in a Person with Diabetes   WHAT YOU NEED TO KNOW:   Hypoglycemia is a serious condition that happens when your blood glucose (sugar) level drops too low  The blood sugar level is usually too high in a person with diabetes, but the level can also drop too low  It is important to follow your diabetes management plan to keep your blood sugar level steady  DISCHARGE INSTRUCTIONS:   Call 911 for any of the following:   · You feel you are going to pass out  · You have a seizure or pass out  · You have trouble thinking clearly  Seek care immediately if:  · Your blood sugar is less than 50 mg/dL and does not respond to treatment  Contact your healthcare provider if:   · You have had symptoms of low blood sugar several times  · You have questions about the amount of insulin or diabetes medicine you are taking  · You have questions or concerns about your condition or care  Medicines:   · Insulin or diabetes medicine  help to keep your blood sugar under control  · Glucagon  may be needed if you have severe hypoglycemia  · Take your medicine as directed  Contact your healthcare provider if you think your medicine is not helping or if you have side effects  Tell him or her if you are allergic to any medicine  Keep a list of the medicines, vitamins, and herbs you take  Include the amounts, and when and why you take them  Bring the list or the pill bottles to follow-up visits  Carry your medicine list with you in case of an emergency  Follow up with your healthcare provider or specialist as directed: You may need dose changes to your insulin or oral diabetes medicine if you have hypoglycemia  Write down your questions so you remember to ask them during your visits  Manage hypoglycemia:   · Check your blood sugar level right away if you have symptoms of hypoglycemia  Hypoglycemia is usually 70 mg/dL or below   Ask your healthcare provider what blood sugar level is too low for you     · If your blood sugar level is too low, eat or drink 15 grams of fast-acting carbohydrate  Examples of this amount of fast-acting carbohydrate are 4 ounces (½ cup) of fruit juice or 4 ounces of regular soda  Other examples are 2 tablespoons of raisins or 3 to 4 glucose tablets  Check your blood sugar level 15 minutes later  If the level is still low (less than 100 mg/dL), have another 15 grams of carbohydrate  When the level returns to 100 mg/dL, eat a snack or meal that contains carbohydrates  This will help prevent another drop in blood sugar  Always carefully follow your healthcare provider's instructions on how to treat low blood sugar levels  · Always carry a source of fast-acting carbohydrate  If you have symptoms of hypoglycemia and you do not have a blood glucose meter, have a source of fast-acting carbohydrate anyway  Avoid carbohydrate foods that are high in fat  The fat content may make it take longer to increase your blood sugar level  Ask your healthcare provider if you should carry a glucagon kit  Glucagon is a medicine that is injected when you develop severe hypoglycemia and become unconscious  Check the expiration date every month and replace it before it expires  · Teach others how to help you if you have symptoms of hypoglycemia  Tell them about the symptoms of hypoglycemia  Ask them to give you a source of fast-acting carbohydrate if you cannot get it yourself  Ask them to give you a glucagon injection if you have symptoms of hypoglycemia and you become unconscious or have a seizure  Ask them to call 911   This is an emergency  Tell them never to try to make you swallow anything if you faint or have a seizure  · Wear medical alert jewelry  or carry a card that says you have diabetes  Ask where to get these items  Prevent hypoglycemia:   · Take diabetes medicine as directed  Take your medicine at the right time and in the right amount   Your healthcare provider may change your blood sugar goals if you get hypoglycemia often  · Eat regular meals and snacks  Talk to your dietitian or healthcare provider about a meal plan that is right for you  Do not skip meals  · Check your blood sugar level as directed  Ask your healthcare provider what your blood sugar levels should be before and after you eat  Ask when and how often to check your blood sugar level  You may need to check at least 3 times each day  Record your blood sugar level results and take the record with you when you see your healthcare provider  Your provider may use the record to make changes to your medicine, food, or exercise schedules  · Check your blood sugar level before you exercise  Exercise can decrease your blood sugar level  If your blood sugar level is less than 100 mg/dL, have a carbohydrate snack  Examples are 4 to 6 crackers, ½ banana, 8 ounces (1 cup) of nonfat or 1% milk, or 4 ounces (½ cup) of juice  If you will exercise for more than 1 hour, you may need to check your blood sugar level every 30 minutes  Your healthcare provider may also recommend that you check your blood sugar level after exercise  · Be aware of how alcohol affects your blood sugar level  Alcohol can cause your blood sugar level to drop for up to 12 hours after drinking  Ask your healthcare provider if alcohol is safe for you  If you drink alcohol, always have a snack or meal at the same time  Women should limit alcohol to 1 drink a day  Men should limit alcohol to 2 drinks a day  A drink of alcohol is 12 ounces of beer, 5 ounces of wine, or 1½ ounces of liquor  © 2017 [address]  Information is for End User's use only and may not be sold, redistributed or otherwise used for commercial purposes  All illustrations and images included in CareNotes® are the copyrighted property of [employer] , [employer]  or [employer] Hern[employer]dez  The above information is an  only   It is not intended as medical advice for individual conditions or treatments  Talk to your doctor, nurse or pharmacist before following any medical regimen to see if it is safe and effective for you

## 2019-02-01 NOTE — ASSESSMENT & PLAN NOTE
Lab Results   Component Value Date    HGBA1C 10 7 (H) 01/03/2019       Uncontrolled type 2 diabetes with complications including retinopathy and nephropathy  Counseled on the importance of strict glycemic control to delay progression of complications  Counseled on the importance of taking insulin as directed  Her for now I am going to continue basaglar- 50 units however she will take it at dinner rather that at bedtime as she forgets to take sometimes  Increase Humalog to 20 units and make sure to take it before every meal   Monitor blood sugars 3 to 4 times a day and sent over fingerstick log in 2 weeks  Will set her up for a diagnostic continuous glucose monitor to get a better understanding of her blood sugar patterns    Being on intensive insulin therapy she is at risk for severe hypoglycemia

## 2019-02-01 NOTE — ASSESSMENT & PLAN NOTE
Lab Results   Component Value Date    HGBA1C 10 7 (H) 01/03/2019       Counseled on the importance of strict glycemic control  Increase losartan to 50 mg daily    Repeat BMP in the next month

## 2019-02-01 NOTE — ASSESSMENT & PLAN NOTE
Counseled on metabolic complications of obesity-focus on dietary and lifestyle modifications and weight loss

## 2019-02-01 NOTE — PROGRESS NOTES
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, with long-term current use of insulin ([address] ) - Primary     Lab Results   Component Value Date    HGBA1C 10 7 (H) 01/03/2019       Uncontrolled type 2 diabetes with complications including retinopathy and nephropathy  Counseled on the importance of strict glycemic control to delay progression of complications  Counseled on the importance of taking insulin as directed  Her for now I am going to continue basaglar- 50 units however she will take it at dinner rather that at bedtime as she forgets to take sometimes  Increase Humalog to 20 units and make sure to take it before every meal   Monitor blood sugars 3 to 4 times a day and sent over fingerstick log in 2 weeks  Will set her up for a diagnostic continuous glucose monitor to get a better understanding of her blood sugar patterns  Being on intensive insulin therapy she is at risk for severe hypoglycemia         Relevant Medications    insulin lispro (HUMALOG KWIKPEN) 100 units/mL injection pen    BASAGLAR KWIKPEN 100 units/mL injection pen    Other Relevant Orders    Continous glucose monitoring livier placement    Continous glucose monitoring livier intrepretation    Hypertension     Blood pressure above goal-increase losartan to 50 mg daily         Relevant Medications    losartan (COZAAR) 25 mg tablet    Hyperlipidemia    Obesity     Counseled on metabolic complications of obesity-focus on dietary and lifestyle modifications and weight loss         Vitamin D deficiency     Continue supplementations         Type 2 diabetes mellitus with microalbuminuria, with long-term current use of insulin ([facility])     Lab Results   Component Value Date    HGBA1C 10 7 (H) 01/03/2019       Counseled on the importance of strict glycemic control    Increase losartan to 50 mg daily  Repeat BMP in the next month           Relevant Medications    insulin lispro (HUMALOG KWIKPEN) 100 units/mL injection pen    BASAGLAR KWIKPEN 100 units/mL injection pen    Other Relevant Orders    Continous glucose monitoring livier placement    Continous glucose monitoring livier intrepretation    Basic metabolic panel Lab Collect        CC: Diabetes    History of Present Illness     HPI:  68-year-old female with history of type 2 diabetes complicated by retinopathy and nephropathy-on long-term insulin therapy here for follow-up  Currently on basaglar 50 units at bedtime   humalog 18-18-18-0  Metformin 2 tabs with dinner     No severe hypoglycemic since last visit    denies any polyuria , polydipsia , c/o blurry vision   Eye exam in dec and had laser treatment   Occasional numbness in feet     Checks 2/days   Most f s between 200-300s   Misses prelunch and predinner - 2-3/ times per week    Will be starting CPAP for sleep apnea soon  Review of Systems   Constitutional: Positive for fatigue  Negative for unexpected weight change  Eyes: Positive for visual disturbance  Respiratory: Negative for cough  Cardiovascular: Positive for leg swelling  Negative for palpitations  Gastrointestinal: Negative for constipation, diarrhea, nausea and vomiting  Endocrine: Negative for polydipsia and polyuria  Musculoskeletal: Negative for arthralgias, gait problem and myalgias  Skin: Negative for color change, pallor and rash  Neurological: Positive for numbness  Psychiatric/Behavioral: Positive for sleep disturbance  All other systems reviewed and are negative        Historical Information   Past Medical History:   Diagnosis Date    Anemia     Arthritis     Diabetes mellitus ([place] [address] )     Dyspnea on exertion     Hyperlipidemia     Hypertension     Legally blind 2010    Mild intermittent asthma with exacerbation 8/4/2018    Miscarriage     x 3    Seizures (HCC)     Sickle cell trait ([facility] ) Past Surgical History:   Procedure Laterality Date    CATARACT EXTRACTION Bilateral     august and september    EYE SURGERY      HYSTERECTOMY      42    OOPHORECTOMY Left     42     Social History   History   Alcohol Use No     History   Drug Use No     History   Smoking Status    Never Smoker   Smokeless Tobacco    Never Used     Family History:   Family History   Problem Relation Age of Onset    Heart attack Mother     Heart disease Mother     Hypertension Mother     No Known Problems Father     Heart disease Sister     No Known Problems Brother     No Known Problems Son     No Known Problems Daughter     Heart attack Maternal Grandmother     Diabetes Other     Heart attack Other        Meds/Allergies   Current Outpatient Prescriptions   Medication Sig Dispense Refill    albuterol (PROVENTIL HFA) 90 mcg/act inhaler Inhale 2 puffs every 6 (six) hours as needed for wheezing For another 24 hours use every 4 hours standing 6 7 g 0    aspirin 81 MG tablet Take 1 tablet by mouth daily      atorvastatin (LIPITOR) 40 mg tablet TAKE 1 TABLET BY MOUTH  DAILY 90 tablet 1    BASAGLAR KWIKPEN 100 units/mL injection pen 50 units daily before dinner 5 pen 3    SARAH MICROLET LANCETS lancets Inject 1 applicator into the skin 4 (four) times a day      Blood Pressure Monitor KIT Check blood pressures BID 1 each 0    budesonide-formoterol (SYMBICORT) 80-4 5 MCG/ACT inhaler Inhale 2 puffs 2 (two) times a day Rinse mouth after use   1 Inhaler 5    chlorthalidone 25 mg tablet Take 0 5 tablets (12 5 mg total) by mouth daily 15 tablet 5    Cholecalciferol (VITAMIN D3) 3000 units TABS Take by mouth      CONTOUR NEXT TEST test strip 4 (four) times a day Test blood sugar  1    diltiazem (CARDIZEM CD) 360 MG 24 hr capsule Take 1 capsule (360 mg total) by mouth daily 30 capsule 5    gabapentin (NEURONTIN) 100 mg capsule Take 1 capsule (100 mg total) by mouth 2 (two) times a day 180 capsule 1    insulin lispro (HUMALOG KWIKPEN) 100 units/mL injection pen Takes 20 units before meals 5 pen 2    Insulin Pen Needle (BD PEN NEEDLE DERRICK U/F) 32G X 4 MM MISC Inject 1 applicator under the skin 4 (four) times a day      losartan (COZAAR) 25 mg tablet Take 2 tablets (50 mg total) by mouth daily 30 tablet 6    metFORMIN (GLUCOPHAGE) 500 mg tablet TAKE 2 TABLETS BY MOUTH  DAILY WITH DINNER 180 tablet 1    spironolactone (ALDACTONE) 25 mg tablet TAKE 1 TABLET BY MOUTH  DAILY 90 tablet 1     No current facility-administered medications for this visit  Allergies   Allergen Reactions    2,4-D Dimethylamine (Amisol)      Patient unsure why this is here        Objective   Vitals: Blood pressure 146/80, pulse 94, height 5' 8 5" (1 74 m), weight (!) 144 kg (316 lb 9 6 oz), not currently breastfeeding  Physical Exam   Constitutional: She is oriented to person, place, and time  She appears well-developed and well-nourished  No distress  HENT:   Head: Normocephalic and atraumatic  Mouth/Throat: No oropharyngeal exudate  Eyes: Conjunctivae and EOM are normal  No scleral icterus  Neck: Normal range of motion  Neck supple  No thyromegaly present  Cardiovascular: Normal rate, regular rhythm and normal heart sounds  No murmur heard  Pulmonary/Chest: Effort normal and breath sounds normal  No respiratory distress  She has no wheezes  She has no rales  Abdominal: Soft  Bowel sounds are normal  She exhibits no distension  There is no tenderness  There is no rebound  Musculoskeletal: She exhibits no edema or deformity  Lymphadenopathy:     She has no cervical adenopathy  Neurological: She is alert and oriented to person, place, and time  Skin: Skin is warm and dry  No rash noted  No erythema  No pallor  Psychiatric: She has a normal mood and affect  Her behavior is normal  Judgment and thought content normal        The history was obtained from the review of the chart, patient      Lab Results:   Lab Results Component Value Date/Time    Hemoglobin A1C 10 7 (H) 01/03/2019 11:19 AM    Hemoglobin A1C 11 1 (H) 06/25/2018 05:23 AM    WBC 7 39 01/07/2019 11:18 AM    WBC 7 70 01/03/2019 11:19 AM    WBC 11 80 (H) 08/05/2018 04:57 AM    Hemoglobin 10 9 (L) 01/07/2019 11:18 AM    Hemoglobin 12 5 01/03/2019 11:19 AM    Hemoglobin 11 1 (L) 08/05/2018 04:57 AM    Hematocrit 35 4 01/07/2019 11:18 AM    Hematocrit 39 7 01/03/2019 11:19 AM    Hematocrit 35 4 (L) 08/05/2018 04:57 AM    MCV 82 01/07/2019 11:18 AM    MCV 79 (L) 01/03/2019 11:19 AM    MCV 80 08/05/2018 04:57 AM    Platelets 371 85/43/9941 11:18 AM    Platelets 371 69/37/3307 11:19 AM    Platelets 823 48/66/3764 04:57 AM    BUN 25 01/07/2019 11:18 AM    BUN 21 01/03/2019 11:19 AM    BUN 34 (H) 08/05/2018 04:57 AM    Potassium 4 7 01/07/2019 11:18 AM    Potassium 4 9 01/03/2019 11:19 AM    Potassium 4 7 08/05/2018 04:57 AM    Chloride 103 01/07/2019 11:18 AM    Chloride 99 01/03/2019 11:19 AM    Chloride 102 08/05/2018 04:57 AM    CO2 29 01/07/2019 11:18 AM    CO2 30 01/03/2019 11:19 AM    CO2 23 08/05/2018 04:57 AM    Creatinine 1 18 01/07/2019 11:18 AM    Creatinine 0 94 01/03/2019 11:19 AM    Creatinine 0 91 08/05/2018 04:57 AM    AST 20 01/03/2019 11:19 AM    AST 23 06/25/2018 05:23 AM    AST 22 06/24/2018 11:08 AM    ALT 23 01/03/2019 11:19 AM    ALT 28 06/25/2018 05:23 AM    ALT 37 06/24/2018 11:08 AM    Albumin 4 0 01/03/2019 11:19 AM    Albumin 3 5 06/25/2018 05:23 AM    Albumin 3 9 06/24/2018 11:08 AM    HDL, Direct 41 01/03/2019 11:19 AM    HDL Cholesterol 46 01/03/2019 11:19 AM    Triglycerides 93 01/03/2019 11:19 AM               Portions of the record may have been created with voice recognition software  Occasional wrong word or "sound a like" substitutions may have occurred due to the inherent limitations of voice recognition software  Read the chart carefully and recognize, using context, where substitutions have occurred

## 2019-02-14 ENCOUNTER — OFFICE VISIT (OUTPATIENT)
Dept: INTERNAL MEDICINE CLINIC | Facility: CLINIC | Age: 59
End: 2019-02-14
Payer: COMMERCIAL

## 2019-02-14 VITALS
WEIGHT: 293 LBS | SYSTOLIC BLOOD PRESSURE: 138 MMHG | HEART RATE: 95 BPM | DIASTOLIC BLOOD PRESSURE: 80 MMHG | BODY MASS INDEX: 43.4 KG/M2 | HEIGHT: 69 IN

## 2019-02-14 DIAGNOSIS — IMO0002 UNCONTROLLED TYPE 2 DIABETES MELLITUS WITH OPHTHALMIC COMPLICATION, WITH LONG-TERM CURRENT USE OF INSULIN: Primary | ICD-10-CM

## 2019-02-14 DIAGNOSIS — I10 ESSENTIAL HYPERTENSION: ICD-10-CM

## 2019-02-14 DIAGNOSIS — R56.9 SEIZURES (HCC): ICD-10-CM

## 2019-02-14 DIAGNOSIS — R80.9 MICROALBUMINURIA: ICD-10-CM

## 2019-02-14 DIAGNOSIS — I28.8 ENLARGED PULMONARY ARTERY (HCC): ICD-10-CM

## 2019-02-14 DIAGNOSIS — I77.819 AORTIC DILATATION (HCC): ICD-10-CM

## 2019-02-14 DIAGNOSIS — J45.21 MILD INTERMITTENT ASTHMA WITH EXACERBATION: ICD-10-CM

## 2019-02-14 DIAGNOSIS — E78.5 HYPERLIPIDEMIA, UNSPECIFIED HYPERLIPIDEMIA TYPE: ICD-10-CM

## 2019-02-14 PROCEDURE — 99214 OFFICE O/P EST MOD 30 MIN: CPT | Performed by: INTERNAL MEDICINE

## 2019-02-14 RX ORDER — DILTIAZEM HYDROCHLORIDE 180 MG/1
180 CAPSULE, EXTENDED RELEASE ORAL DAILY
Refills: 5 | COMMUNITY
Start: 2019-01-31 | End: 2019-02-14

## 2019-02-14 RX ORDER — INSULIN GLARGINE 100 [IU]/ML
INJECTION, SOLUTION SUBCUTANEOUS
Qty: 5 PEN | Refills: 3
Start: 2019-02-14 | End: 2019-04-03 | Stop reason: SDUPTHER

## 2019-02-14 NOTE — ASSESSMENT & PLAN NOTE
Lab Results   Component Value Date    HGBA1C 10 7 (H) 01/03/2019       No results for input(s): POCGLU in the last 72 hours  Blood Sugar Average: Last 72 hrs:   not well control, fastings have been around 200-250 with elevated blood sugars all day long, will increase the basal insulin to 60 units in a day  I try to see if the concentrated insulin would be covered by her insurance but it was denied  At this time will continue with basaglar  Ideal would be to increase the insulin to 30 units twice a day  But if compliance will be a problem, could try 60 units at bedtime  Continue Humalog at this time at 20 units 3 times a day with meals  Advised her to send in blood sugar readings to endocrinology office as recommended in a week

## 2019-02-14 NOTE — ASSESSMENT & PLAN NOTE
Improved control, on now on diltiazem 360 mg, chlorthalidone 12 5 mg, 50 mg of losartan and 25 mg of spironolactone  I asked her to get her BMP, home readings are better than office readings  Monitor potassium levels specially being on both losartan and spironolactone  Could try to simplify regimen if okay with Cardiology to discontinue spironolactone and increase losartan dose

## 2019-02-14 NOTE — PROGRESS NOTES
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, with long-term current use of insulin ([facility])  Lab Results   Component Value Date    HGBA1C 10 7 (H) 01/03/2019       No results for input(s): POCGLU in the last 72 hours  Blood Sugar Average: Last 72 hrs:   not well control, fastings have been around 200-250 with elevated blood sugars all day long, will increase the basal insulin to 60 units in a day  I try to see if the concentrated insulin would be covered by her insurance but it was denied  At this time will continue with basaglar  Ideal would be to increase the insulin to 30 units twice a day  But if compliance will be a problem, could try 60 units at bedtime  Continue Humalog at this time at 20 units 3 times a day with meals  Advised her to send in blood sugar readings to endocrinology office as recommended in a week  Hypertension  Improved control, on now on diltiazem 360 mg, chlorthalidone 12 5 mg, 50 mg of losartan and 25 mg of spironolactone  I asked her to get her BMP, home readings are better than office readings  Monitor potassium levels specially being on both losartan and spironolactone  Could try to simplify regimen if okay with Cardiology to discontinue spironolactone and increase losartan dose  Microalbuminuria  Discussed that tight control of diabetes and hypertension would be important to halt progression    Hyperlipidemia  Continue atorvastatin    Mild intermittent asthma with exacerbation  Advised to use Symbicort regularly to avoid exacerbations       Diagnoses and all orders for this visit:    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 [address] )    Enlarged pulmonary artery (HCC)    Aortic dilatation (HCC)    Essential hypertension    Hyperlipidemia, unspecified hyperlipidemia type    Microalbuminuria    Mild intermittent asthma with exacerbation    Other orders  -     Discontinue: CARTIA  MG 24 hr capsule; Take 180 mg by mouth daily          Subjective:   Chief Complaint   Patient presents with    Follow-up     1 month        Patient ID: Gabriele Grace is a 59 y o  female  She comes in for follow-up of hypertension, diabetes, hyperlipidemia, asthma  She notes that she is overall feeling better with better balance  Had some asthma symptoms 2 weeks ago but feeling better now  Not using Symbicort regularly  Blood sugars have continued to be elevated  She is more focused on monitoring regularly now and taking her medications as they are supposed to as well  The following portions of the patient's history were reviewed and updated as appropriate: current medications, past medical history, past social history and past surgical history      PHQ-9 Depression Screening    PHQ-9:    Frequency of the following problems over the past two weeks:                Current Outpatient Medications:     albuterol (PROVENTIL HFA) 90 mcg/act inhaler, Inhale 2 puffs every 6 (six) hours as needed for wheezing For another 24 hours use every 4 hours standing, Disp: 6 7 g, Rfl: 0    aspirin 81 MG tablet, Take 1 tablet by mouth daily, Disp: , Rfl:     atorvastatin (LIPITOR) 40 mg tablet, TAKE 1 TABLET BY MOUTH  DAILY, Disp: 90 tablet, Rfl: 1    BASAGLAR KWIKPEN 100 units/mL injection pen, 60 units daily before dinner, Disp: 5 pen, Rfl: 3    SARAH MICROLET LANCETS lancets, Inject 1 applicator into the skin 4 (four) times a day, Disp: , Rfl:     Blood Pressure Monitor KIT, Check blood pressures BID, Disp: 1 each, Rfl: 0    budesonide-formoterol (SYMBICORT) 80-4 5 MCG/ACT inhaler, Inhale 2 puffs 2 (two) times a day Rinse mouth after use , Disp: 1 Inhaler, Rfl: 5    chlorthalidone 25 mg tablet, Take 0 5 tablets (12 5 mg total) by mouth daily, Disp: 15 tablet, Rfl: 5    Cholecalciferol (VITAMIN D3) 3000 units TABS, Take by mouth, Disp: , Rfl:     CONTOUR NEXT TEST test strip, 4 (four) times a day Test blood sugar, Disp: , Rfl: 1    diltiazem (CARDIZEM CD) 360 MG 24 hr capsule, Take 1 capsule (360 mg total) by mouth daily, Disp: 30 capsule, Rfl: 5    gabapentin (NEURONTIN) 100 mg capsule, Take 1 capsule (100 mg total) by mouth 2 (two) times a day, Disp: 180 capsule, Rfl: 1    insulin lispro (HUMALOG KWIKPEN) 100 units/mL injection pen, Takes 20 units before meals, Disp: 5 pen, Rfl: 2    Insulin Pen Needle (BD PEN NEEDLE DERRICK U/F) 32G X 4 MM MISC, Inject 1 applicator under the skin 4 (four) times a day, Disp: , Rfl:     losartan (COZAAR) 25 mg tablet, Take 2 tablets (50 mg total) by mouth daily, Disp: 30 tablet, Rfl: 6    metFORMIN (GLUCOPHAGE) 500 mg tablet, TAKE 2 TABLETS BY MOUTH  DAILY WITH DINNER, Disp: 180 tablet, Rfl: 1    spironolactone (ALDACTONE) 25 mg tablet, TAKE 1 TABLET BY MOUTH  DAILY, Disp: 90 tablet, Rfl: 1    Review of Systems   Constitutional: Positive for fatigue  Negative for fever and unexpected weight change  HENT: Negative for ear pain, hearing loss and sore throat  Eyes: Positive for visual disturbance  Negative for pain and discharge  Respiratory: Negative for cough, chest tightness and shortness of breath  Cardiovascular: Negative for chest pain and palpitations  Gastrointestinal: Negative for abdominal pain, blood in stool, constipation, diarrhea and nausea  Genitourinary: Negative for dysuria, frequency and hematuria  Musculoskeletal: Negative for arthralgias and joint swelling  Skin: Negative for rash  Allergic/Immunologic: Negative for immunocompromised state  Neurological: Positive for dizziness  Negative for headaches  Hematological: Negative for adenopathy     Psychiatric/Behavioral: Negative for confusion and sleep disturbance  Objective:  /80   Pulse 95   Ht 5' 8 5" (1 74 m)   Wt (!) 142 kg (312 lb 3 2 oz)   BMI 46 78 kg/m²      Physical Exam   Constitutional: She appears well-developed and well-nourished  Morbid obesity   HENT:   Head: Normocephalic and atraumatic  Right Ear: Tympanic membrane normal    Left Ear: Tympanic membrane normal    Nose: Nose normal    Mouth/Throat: Oropharynx is clear and moist  No posterior oropharyngeal edema or posterior oropharyngeal erythema  Eyes: Pupils are equal, round, and reactive to light  Conjunctivae are normal  Right eye exhibits no discharge  Neck: Normal range of motion  Neck supple  No thyromegaly present  Cardiovascular: Normal rate, regular rhythm, S1 normal, S2 normal and normal heart sounds  PMI is not displaced  No murmur heard  Pulmonary/Chest: Effort normal and breath sounds normal  No accessory muscle usage  No apnea  No respiratory distress  She has no rhonchi  She has no rales  Abdominal: Soft  Normal appearance and bowel sounds are normal  She exhibits no shifting dullness  There is no hepatosplenomegaly  There is no tenderness  There is no rebound and no CVA tenderness  Musculoskeletal: Normal range of motion  She exhibits no edema or tenderness  Lymphadenopathy:     She has no cervical adenopathy  Neurological: She is alert  Skin: Skin is warm and intact  No rash noted  Psychiatric: She has a normal mood and affect  Her speech is normal    Nursing note and vitals reviewed          Recent Results (from the past 1008 hour(s))   Basic metabolic panel    Collection Time: 01/07/19 11:18 AM   Result Value Ref Range    Sodium 137 136 - 145 mmol/L    Potassium 4 7 3 5 - 5 3 mmol/L    Chloride 103 100 - 108 mmol/L    CO2 29 21 - 32 mmol/L    ANION GAP 5 4 - 13 mmol/L    BUN 25 5 - 25 mg/dL    Creatinine 1 18 0 60 - 1 30 mg/dL    Glucose 262 (H) 65 - 140 mg/dL    Glucose, Fasting 262 (H) 65 - 99 mg/dL    Calcium 8 7 8 3 - 10 1 mg/dL    eGFR 58 ml/min/1 73sq m   CBC    Collection Time: 01/07/19 11:18 AM   Result Value Ref Range    WBC 7 39 4 31 - 10 16 Thousand/uL    RBC 4 33 3 81 - 5 12 Million/uL    Hemoglobin 10 9 (L) 11 5 - 15 4 g/dL    Hematocrit 35 4 34 8 - 46 1 %    MCV 82 82 - 98 fL    MCH 25 2 (L) 26 8 - 34 3 pg    MCHC 30 8 (L) 31 4 - 37 4 g/dL    RDW 14 6 11 6 - 15 1 %    Platelets 316 710 - 533 Thousands/uL    MPV 10 7 8 9 - 12 7 fL   ]    Procedure: Mammo Screening Bilateral W Cad    Result Date: 1/11/2019  Narrative: DIAGNOSIS: Screening for malignant neoplasm of breast RELEVANT HISTORY: Family Breast Cancer History: No known family history of breast cancer  Family Medical history: No relevant family history has been documented for this patient  Personal History: No relevant hormone history has been documented for this patient  Surgical history includes hysterectomy and oophorectomy  No relevant medical history has been documented for this patient  COMPARISONS: N/A INDICATION: Wallace Herrera is a 59 y o  female presenting for screening mammogram  TECHNOLOGIST: Dominique Sky: 2D views: CC, MLO views of right, left breast(s) were taken  2D synth views: No views of the breast(s) were taken  3D views: No views of the breast(s) were taken  TECHNIQUE: The current study was evaluated with Computer Aided Detection  TISSUE DENSITY: There are scattered areas of fibroglandular density  A breast health care nurse from our facility will be contacting the patient regarding the need for additional imaging  The patient is scheduled in a reminder system for screening mammography  8-10% of cancers will be missed on mammography  Management of a palpable abnormality must be based on clinical grounds  Patients will be notified of their results via letter from our facility  Accredited by [employer] and FDA   RISK ASSESSMENT: 5 Year Tyrer-Cuzick: 1 59 % 10 Year Tyrer-Cuzick: 3 32 % Lifetime Tyrer-Cuzick: 8 26 % FINDINGS: LEFT 1) CALCIFICATIONS: There are calcifications in a grouped distribution seen in the central region of the left breast in the middle depth, 8 cm from the nipple  There are no similar findings  2) ASYMMETRY: There is an asymmetry seen in the outer region of the left breast in the middle depth on the CC view  RIGHT 3) FOCAL ASYMMETRY: There is a focal asymmetry seen in the upper outer quadrant of the right breast in the anterior depth, 4 cm from the nipple  Impression:  Additional imaging required  ASSESSMENT/BI-RADS CATEGORY: Left: 0 - Incomplete: Needs Additional Imaging Evaluation Right: 0 - Incomplete: Needs Additional Imaging Evaluation Overall: 0 - Incomplete: Needs Additional Imaging Evaluation RECOMMENDATION:      - Diagnostic Mammogram at the current time for both breasts  Workstation ID: [id number]     BMI Counseling: Body mass index is 46 78 kg/m²  Discussed the patient's BMI with her  The BMI is above average  BMI counseling and education was provided to the patient  Nutrition recommendations include decreasing overall calorie intake and moderation in carbohydrate intake  Exercise recommendations include strength training exercises

## 2019-03-13 DIAGNOSIS — IMO0002 UNCONTROLLED TYPE 2 DIABETES MELLITUS WITH OPHTHALMIC COMPLICATION, WITH LONG-TERM CURRENT USE OF INSULIN: ICD-10-CM

## 2019-03-13 DIAGNOSIS — I10 ESSENTIAL HYPERTENSION: ICD-10-CM

## 2019-03-14 ENCOUNTER — OFFICE VISIT (OUTPATIENT)
Dept: CARDIOLOGY CLINIC | Facility: CLINIC | Age: 59
End: 2019-03-14
Payer: COMMERCIAL

## 2019-03-14 ENCOUNTER — APPOINTMENT (OUTPATIENT)
Dept: LAB | Facility: HOSPITAL | Age: 59
End: 2019-03-14
Payer: COMMERCIAL

## 2019-03-14 VITALS
HEIGHT: 69 IN | WEIGHT: 293 LBS | DIASTOLIC BLOOD PRESSURE: 72 MMHG | BODY MASS INDEX: 43.4 KG/M2 | HEART RATE: 88 BPM | SYSTOLIC BLOOD PRESSURE: 112 MMHG

## 2019-03-14 DIAGNOSIS — Z79.4 TYPE 2 DIABETES MELLITUS WITH MICROALBUMINURIA, WITH LONG-TERM CURRENT USE OF INSULIN (HCC): ICD-10-CM

## 2019-03-14 DIAGNOSIS — IMO0002 UNCONTROLLED TYPE 2 DIABETES MELLITUS WITH OPHTHALMIC COMPLICATION, WITH LONG-TERM CURRENT USE OF INSULIN: ICD-10-CM

## 2019-03-14 DIAGNOSIS — I10 ESSENTIAL HYPERTENSION: Primary | ICD-10-CM

## 2019-03-14 DIAGNOSIS — I10 ESSENTIAL HYPERTENSION: ICD-10-CM

## 2019-03-14 DIAGNOSIS — E11.29 TYPE 2 DIABETES MELLITUS WITH MICROALBUMINURIA, WITH LONG-TERM CURRENT USE OF INSULIN (HCC): ICD-10-CM

## 2019-03-14 DIAGNOSIS — R80.9 TYPE 2 DIABETES MELLITUS WITH MICROALBUMINURIA, WITH LONG-TERM CURRENT USE OF INSULIN (HCC): ICD-10-CM

## 2019-03-14 LAB
ANION GAP SERPL CALCULATED.3IONS-SCNC: 9 MMOL/L (ref 4–13)
BUN SERPL-MCNC: 26 MG/DL (ref 5–25)
CALCIUM SERPL-MCNC: 9.6 MG/DL (ref 8.3–10.1)
CHLORIDE SERPL-SCNC: 102 MMOL/L (ref 100–108)
CO2 SERPL-SCNC: 29 MMOL/L (ref 21–32)
CREAT SERPL-MCNC: 1.1 MG/DL (ref 0.6–1.3)
GFR SERPL CREATININE-BSD FRML MDRD: 63 ML/MIN/1.73SQ M
GLUCOSE P FAST SERPL-MCNC: 191 MG/DL (ref 65–99)
POTASSIUM SERPL-SCNC: 4.1 MMOL/L (ref 3.5–5.3)
SODIUM SERPL-SCNC: 140 MMOL/L (ref 136–145)

## 2019-03-14 PROCEDURE — 80048 BASIC METABOLIC PNL TOTAL CA: CPT | Performed by: INTERNAL MEDICINE

## 2019-03-14 PROCEDURE — 99214 OFFICE O/P EST MOD 30 MIN: CPT | Performed by: INTERNAL MEDICINE

## 2019-03-14 PROCEDURE — 36415 COLL VENOUS BLD VENIPUNCTURE: CPT | Performed by: INTERNAL MEDICINE

## 2019-03-14 PROCEDURE — 93000 ELECTROCARDIOGRAM COMPLETE: CPT | Performed by: INTERNAL MEDICINE

## 2019-03-14 RX ORDER — CHLORTHALIDONE 25 MG/1
25 TABLET ORAL DAILY
Qty: 30 TABLET | Refills: 5 | Status: SHIPPED | OUTPATIENT
Start: 2019-03-14 | End: 2019-09-30 | Stop reason: HOSPADM

## 2019-03-14 RX ORDER — LOSARTAN POTASSIUM 100 MG/1
100 TABLET ORAL DAILY
Qty: 30 TABLET | Refills: 5 | Status: SHIPPED | OUTPATIENT
Start: 2019-03-14 | End: 2019-10-20 | Stop reason: SDUPTHER

## 2019-03-14 RX ORDER — LOSARTAN POTASSIUM 50 MG/1
50 TABLET ORAL DAILY
Qty: 90 TABLET | Refills: 0 | Status: SHIPPED | OUTPATIENT
Start: 2019-03-14 | End: 2019-04-01 | Stop reason: ALTCHOICE

## 2019-03-14 NOTE — PROGRESS NOTES
[address] [facility] [place]  [address]  [facility] [facility]  [phone]    Cardiology Follow up    Patient:  Soo Greer  :  1960  MRN:  [id number]    History of Present Illness:     42-year-old female with past medical history of diabetes since , normal coronary arteries from 2018, microalbuminemia, neuropathy, hypertension with severe left ventricular hypertrophy, family history of cardiovascular disease in her mother who had sudden cardiac death at 66years old, aneurysmal ascending aorta measuring up to 4 3 cm, prominent main pulmonary artery at 3 5 cm, recently diagnosed reportedly mild sleep apnea not currently on CPAP (waiting for new mask), recently diagnosed asthma, remote history of seizures, presents for follow-up      He has not had any chest pain, shortness of breath, palpitations, dizziness, or syncope  She has not been walking significantly because of back pain  She is going to start a weight management program and workout program in April        Patient Active Problem List   Diagnosis    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, with long-term current use of insulin ([facility] [address] )    Hypertension    Seizures (HCC)    Exertional dyspnea    Enlarged pulmonary artery (HCC)    Aortic dilatation (HCC)    Anemia    Decreased pulses in feet    Diabetes mellitus type 2 with complications, uncontrolled (HCC)    Hyperlipidemia    Microalbuminuria    Mild obstructive sleep apnea    Obesity    Peripheral neuropathy    Prolonged QT interval    Visual impairment in both eyes    Vitamin D deficiency    Lung nodules    Orthostatic hypotension    Mild intermittent asthma with exacerbation    Type 2 diabetes mellitus with microalbuminuria, with long-term current use of insulin [facility])       Past Surgical History  Past Surgical History:   Procedure Laterality Date    CATARACT EXTRACTION Bilateral     august and september    EYE SURGERY      HYSTERECTOMY      37    OOPHORECTOMY Left     37       Social History   Social History     Socioeconomic History    Marital status: /Civil Union     Spouse name: Not on file    Number of children: Not on file    Years of education: Not on file    Highest education level: Not on file   Occupational History    Occupation: employed   Social Needs    Financial resource strain: Not on file    Food insecurity:     Worry: Not on file     Inability: Not on file   [employer] needs:     Medical: Not on file     Non-medical: Not on file   Tobacco Use    Smoking status: Never Smoker    Smokeless tobacco: Never Used   Substance and Sexual Activity    Alcohol use: No    Drug use: No    Sexual activity: Never     Birth control/protection: None   Lifestyle    Physical activity:     Days per week: Not on file     Minutes per session: Not on file    Stress: Not on file   Relationships    Social connections:     Talks on phone: Not on file     Gets together: Not on file     Attends Hinduism service: Not on file     Active member of club or organization: Not on file     Attends meetings of clubs or organizations: Not on file     Relationship status: Not on file    Intimate partner violence:     Fear of current or ex partner: Not on file     Emotionally abused: Not on file     Physically abused: Not on file     Forced sexual activity: Not on file   Other Topics Concern    Not on file   Social History Narrative    Caffeine use        Allergies   Allergen Reactions    2,4-D Dimethylamine (Amisol)      Patient unsure why this is here        Family History   Family History   Problem Relation Age of Onset    Heart attack Mother     Heart disease Mother     Hypertension Mother     No Known Problems Father     Heart disease Sister     No Known Problems Brother     No Known Problems Son     No Known Problems Daughter     Heart attack Maternal Grandmother     Diabetes Other     Heart attack Other Review of Systems:  Review of Systems   Constitutional: Negative for chills, fatigue and fever  HENT: Negative for hearing loss, nosebleeds and tinnitus  Eyes: Negative for pain  Respiratory: Negative for cough, chest tightness and shortness of breath  Cardiovascular: Negative for chest pain, palpitations and leg swelling  Gastrointestinal: Negative for abdominal pain, nausea and vomiting  Endocrine: Negative for cold intolerance and heat intolerance  Genitourinary: Negative for difficulty urinating, hematuria and vaginal bleeding  Musculoskeletal: Positive for back pain  Negative for arthralgias  Skin: Negative for rash  Allergic/Immunologic: Negative for environmental allergies  Neurological: Negative for dizziness, syncope, weakness and light-headedness  Psychiatric/Behavioral: Positive for sleep disturbance  Negative for decreased concentration  The patient is not nervous/anxious            Current Outpatient Medications:     albuterol (PROVENTIL HFA) 90 mcg/act inhaler, Inhale 2 puffs every 6 (six) hours as needed for wheezing For another 24 hours use every 4 hours standing, Disp: 6 7 g, Rfl: 0    aspirin 81 MG tablet, Take 1 tablet by mouth daily, Disp: , Rfl:     atorvastatin (LIPITOR) 40 mg tablet, TAKE 1 TABLET BY MOUTH  DAILY, Disp: 90 tablet, Rfl: 1    BASAGLAR KWIKPEN 100 units/mL injection pen, 60 units daily before dinner, Disp: 5 pen, Rfl: 3    SARAH MICROLET LANCETS lancets, Inject 1 applicator into the skin 4 (four) times a day, Disp: , Rfl:     Blood Pressure Monitor KIT, Check blood pressures BID, Disp: 1 each, Rfl: 0    budesonide-formoterol (SYMBICORT) 80-4 5 MCG/ACT inhaler, Inhale 2 puffs 2 (two) times a day Rinse mouth after use , Disp: 1 Inhaler, Rfl: 5    chlorthalidone 25 mg tablet, Take 0 5 tablets (12 5 mg total) by mouth daily, Disp: 15 tablet, Rfl: 5    Cholecalciferol (VITAMIN D3) 3000 units TABS, Take by mouth, Disp: , Rfl:     CONTOUR NEXT TEST test strip, 4 (four) times a day Test blood sugar, Disp: , Rfl: 1    diltiazem (CARDIZEM CD) 360 MG 24 hr capsule, Take 1 capsule (360 mg total) by mouth daily, Disp: 30 capsule, Rfl: 5    gabapentin (NEURONTIN) 100 mg capsule, Take 1 capsule (100 mg total) by mouth 2 (two) times a day, Disp: 180 capsule, Rfl: 1    insulin lispro (HUMALOG KWIKPEN) 100 units/mL injection pen, INJECT 10 UNITS UNDER THE SKIN BEFORE BREAKFAST AND 6 UNITS BEFORE LUNCH AND 14 UNITS BEFORE DINNER, Disp: 5 pen, Rfl: 0    Insulin Pen Needle (BD PEN NEEDLE DERRICK U/F) 32G X 4 MM MISC, Inject 1 applicator under the skin 4 (four) times a day, Disp: , Rfl:     losartan (COZAAR) 25 mg tablet, Take 2 tablets (50 mg total) by mouth daily, Disp: 30 tablet, Rfl: 6    metFORMIN (GLUCOPHAGE) 500 mg tablet, TAKE 2 TABLETS BY MOUTH  DAILY WITH DINNER, Disp: 180 tablet, Rfl: 1    spironolactone (ALDACTONE) 25 mg tablet, TAKE 1 TABLET BY MOUTH  DAILY, Disp: 90 tablet, Rfl: 1     Physical Exam:    There were no vitals filed for this visit  Physical Exam   Constitutional: She is oriented to person, place, and time  She appears well-developed and well-nourished  HENT:   Head: Normocephalic  Right Ear: External ear normal    Left Ear: External ear normal    Mouth/Throat: Oropharynx is clear and moist    Eyes: Pupils are equal, round, and reactive to light  Neck: No JVD present  Carotid bruit is not present  Cardiovascular: Normal rate, regular rhythm and intact distal pulses  Exam reveals no gallop and no friction rub  No murmur heard  Pulmonary/Chest: Effort normal and breath sounds normal  No tachypnea  No respiratory distress  She has no wheezes  She has no rales  She exhibits no tenderness  Abdominal: Soft  She exhibits no distension  There is no tenderness  There is no rebound and no guarding  Musculoskeletal: She exhibits no edema  Neurological: She is alert and oriented to person, place, and time     Skin: Skin is warm and dry    Psychiatric: She has a normal mood and affect  Her behavior is normal  Judgment and thought content normal    Nursing note and vitals reviewed  Labs:not applicable    Assessment/Plan:    1  Hypertension  This is better but not yet controlled  I would like to increase the chlorthalidone to 25 mg daily and increase losartan to 100 mg daily  I would like to have her follow up with basic metabolic panel about 2 weeks later  Thankfully she is starting an exercise program and weight management program which very well may help her blood pressure  2  Dilated ascending aorta and dilated pulmonary artery  She will need follow-up on this in the future  3  Obstructive sleep apnea not on CPAP  She will get a new mask to see if this works for her  I would like to see her back in about 3 months and see how her blood pressure is at that time  Thank you so much, please do not hesitate to contact me with any any questions or concerns        Bernardo Duarte MD  3/14/2019  9:01 AM

## 2019-03-15 ENCOUNTER — TELEPHONE (OUTPATIENT)
Dept: INTERNAL MEDICINE CLINIC | Facility: CLINIC | Age: 59
End: 2019-03-15

## 2019-03-15 NOTE — TELEPHONE ENCOUNTER
----- Message from Jesus Esteves MD sent at 3/14/2019 12:46 PM EDT -----  BMP is good, Dr Darlene Gomez changed her BP meds and asked to check another BMP

## 2019-03-19 ENCOUNTER — TELEPHONE (OUTPATIENT)
Dept: INTERNAL MEDICINE CLINIC | Facility: CLINIC | Age: 59
End: 2019-03-19

## 2019-03-19 NOTE — TELEPHONE ENCOUNTER
2nd message left on VM to call the office regarding a reminder to get her lab done which was ordered by Dr Shawn Vega

## 2019-03-19 NOTE — TELEPHONE ENCOUNTER
----- Message from Agnieszka Newton MD sent at 3/14/2019 12:46 PM EDT -----  BMP is good, Dr Paola Garcia changed her BP meds and asked to check another BMP

## 2019-04-01 ENCOUNTER — CONSULT (OUTPATIENT)
Dept: BARIATRICS | Facility: CLINIC | Age: 59
End: 2019-04-01
Payer: COMMERCIAL

## 2019-04-01 VITALS
WEIGHT: 293 LBS | BODY MASS INDEX: 44.41 KG/M2 | TEMPERATURE: 98.5 F | SYSTOLIC BLOOD PRESSURE: 118 MMHG | RESPIRATION RATE: 14 BRPM | HEART RATE: 86 BPM | HEIGHT: 68 IN | DIASTOLIC BLOOD PRESSURE: 70 MMHG

## 2019-04-01 DIAGNOSIS — E66.01 CLASS 3 SEVERE OBESITY WITH SERIOUS COMORBIDITY AND BODY MASS INDEX (BMI) OF 45.0 TO 49.9 IN ADULT, UNSPECIFIED OBESITY TYPE (HCC): Primary | ICD-10-CM

## 2019-04-01 DIAGNOSIS — IMO0002 UNCONTROLLED TYPE 2 DIABETES MELLITUS WITH OPHTHALMIC COMPLICATION, WITH LONG-TERM CURRENT USE OF INSULIN: ICD-10-CM

## 2019-04-01 DIAGNOSIS — G47.33 MILD OBSTRUCTIVE SLEEP APNEA: ICD-10-CM

## 2019-04-01 DIAGNOSIS — R56.9 SEIZURES (HCC): ICD-10-CM

## 2019-04-01 DIAGNOSIS — E78.5 HYPERLIPIDEMIA, UNSPECIFIED HYPERLIPIDEMIA TYPE: ICD-10-CM

## 2019-04-01 DIAGNOSIS — I10 ESSENTIAL HYPERTENSION: ICD-10-CM

## 2019-04-01 PROCEDURE — 99204 OFFICE O/P NEW MOD 45 MIN: CPT | Performed by: PHYSICIAN ASSISTANT

## 2019-04-03 ENCOUNTER — OFFICE VISIT (OUTPATIENT)
Dept: INTERNAL MEDICINE CLINIC | Facility: CLINIC | Age: 59
End: 2019-04-03
Payer: MEDICARE

## 2019-04-03 ENCOUNTER — TELEPHONE (OUTPATIENT)
Dept: BARIATRICS | Facility: CLINIC | Age: 59
End: 2019-04-03

## 2019-04-03 VITALS
HEIGHT: 68 IN | BODY MASS INDEX: 44.41 KG/M2 | SYSTOLIC BLOOD PRESSURE: 122 MMHG | OXYGEN SATURATION: 98 % | TEMPERATURE: 98.5 F | HEART RATE: 98 BPM | WEIGHT: 293 LBS | DIASTOLIC BLOOD PRESSURE: 76 MMHG

## 2019-04-03 DIAGNOSIS — I10 ESSENTIAL HYPERTENSION: Primary | ICD-10-CM

## 2019-04-03 DIAGNOSIS — E78.49 OTHER HYPERLIPIDEMIA: ICD-10-CM

## 2019-04-03 DIAGNOSIS — IMO0002 UNCONTROLLED TYPE 2 DIABETES MELLITUS WITH OPHTHALMIC COMPLICATION, WITH LONG-TERM CURRENT USE OF INSULIN: ICD-10-CM

## 2019-04-03 DIAGNOSIS — H54.3 VISUAL IMPAIRMENT IN BOTH EYES: ICD-10-CM

## 2019-04-03 DIAGNOSIS — G63 POLYNEUROPATHY ASSOCIATED WITH UNDERLYING DISEASE (HCC): ICD-10-CM

## 2019-04-03 DIAGNOSIS — E55.9 VITAMIN D DEFICIENCY: ICD-10-CM

## 2019-04-03 LAB — SL AMB POCT HEMOGLOBIN AIC: 9.9 (ref ?–6.5)

## 2019-04-03 PROCEDURE — 99214 OFFICE O/P EST MOD 30 MIN: CPT | Performed by: INTERNAL MEDICINE

## 2019-04-03 PROCEDURE — 83036 HEMOGLOBIN GLYCOSYLATED A1C: CPT | Performed by: INTERNAL MEDICINE

## 2019-04-03 RX ORDER — INSULIN GLARGINE 100 [IU]/ML
INJECTION, SOLUTION SUBCUTANEOUS
Qty: 5 PEN | Refills: 3
Start: 2019-04-03 | End: 2019-08-22 | Stop reason: SDUPTHER

## 2019-04-12 ENCOUNTER — TELEPHONE (OUTPATIENT)
Dept: INTERNAL MEDICINE CLINIC | Facility: CLINIC | Age: 59
End: 2019-04-12

## 2019-04-15 LAB
LEFT EYE DIABETIC RETINOPATHY: NORMAL
RIGHT EYE DIABETIC RETINOPATHY: NORMAL

## 2019-05-08 ENCOUNTER — PATIENT OUTREACH (OUTPATIENT)
Dept: INTERNAL MEDICINE CLINIC | Facility: CLINIC | Age: 59
End: 2019-05-08

## 2019-05-08 DIAGNOSIS — Z71.89 COMPLEX CARE COORDINATION: Primary | ICD-10-CM

## 2019-05-13 ENCOUNTER — TELEPHONE (OUTPATIENT)
Dept: ENDOCRINOLOGY | Facility: CLINIC | Age: 59
End: 2019-05-13

## 2019-05-13 ENCOUNTER — PATIENT OUTREACH (OUTPATIENT)
Dept: INTERNAL MEDICINE CLINIC | Facility: CLINIC | Age: 59
End: 2019-05-13

## 2019-05-13 DIAGNOSIS — IMO0002 UNCONTROLLED TYPE 2 DIABETES MELLITUS WITH OPHTHALMIC COMPLICATION, WITH LONG-TERM CURRENT USE OF INSULIN: ICD-10-CM

## 2019-05-14 ENCOUNTER — PATIENT OUTREACH (OUTPATIENT)
Dept: INTERNAL MEDICINE CLINIC | Facility: CLINIC | Age: 59
End: 2019-05-14

## 2019-05-15 ENCOUNTER — PATIENT OUTREACH (OUTPATIENT)
Dept: INTERNAL MEDICINE CLINIC | Facility: CLINIC | Age: 59
End: 2019-05-15

## 2019-05-20 ENCOUNTER — PATIENT OUTREACH (OUTPATIENT)
Dept: INTERNAL MEDICINE CLINIC | Facility: CLINIC | Age: 59
End: 2019-05-20

## 2019-05-22 LAB
LEFT EYE DIABETIC RETINOPATHY: NORMAL
RIGHT EYE DIABETIC RETINOPATHY: NORMAL

## 2019-05-29 ENCOUNTER — PATIENT OUTREACH (OUTPATIENT)
Dept: INTERNAL MEDICINE CLINIC | Facility: CLINIC | Age: 59
End: 2019-05-29

## 2019-06-12 ENCOUNTER — TELEPHONE (OUTPATIENT)
Dept: ENDOCRINOLOGY | Facility: CLINIC | Age: 59
End: 2019-06-12

## 2019-06-20 ENCOUNTER — OFFICE VISIT (OUTPATIENT)
Dept: CARDIOLOGY CLINIC | Facility: CLINIC | Age: 59
End: 2019-06-20
Payer: COMMERCIAL

## 2019-06-20 ENCOUNTER — TELEPHONE (OUTPATIENT)
Dept: CARDIOLOGY CLINIC | Facility: CLINIC | Age: 59
End: 2019-06-20

## 2019-06-20 VITALS
HEART RATE: 88 BPM | WEIGHT: 293 LBS | HEIGHT: 69 IN | DIASTOLIC BLOOD PRESSURE: 72 MMHG | RESPIRATION RATE: 18 BRPM | BODY MASS INDEX: 43.4 KG/M2 | SYSTOLIC BLOOD PRESSURE: 130 MMHG

## 2019-06-20 DIAGNOSIS — I10 ESSENTIAL HYPERTENSION: ICD-10-CM

## 2019-06-20 PROCEDURE — 99214 OFFICE O/P EST MOD 30 MIN: CPT | Performed by: INTERNAL MEDICINE

## 2019-06-20 RX ORDER — DILTIAZEM HYDROCHLORIDE 420 MG/1
420 CAPSULE, EXTENDED RELEASE ORAL DAILY
Qty: 30 CAPSULE | Refills: 5 | Status: SHIPPED | OUTPATIENT
Start: 2019-06-20 | End: 2020-02-12 | Stop reason: HOSPADM

## 2019-07-02 ENCOUNTER — HOSPITAL ENCOUNTER (OUTPATIENT)
Dept: NON INVASIVE DIAGNOSTICS | Facility: HOSPITAL | Age: 59
Discharge: HOME | End: 2019-07-02
Attending: INTERNAL MEDICINE
Payer: COMMERCIAL

## 2019-07-02 DIAGNOSIS — I10 ESSENTIAL HYPERTENSION: ICD-10-CM

## 2019-07-02 PROCEDURE — 93226 XTRNL ECG REC<48 HR SCAN A/R: CPT

## 2019-07-02 PROCEDURE — 93225 XTRNL ECG REC<48 HRS REC: CPT

## 2019-07-03 PROCEDURE — 93227 XTRNL ECG REC<48 HR R&I: CPT | Performed by: INTERNAL MEDICINE

## 2019-07-08 ENCOUNTER — OFFICE VISIT (OUTPATIENT)
Dept: ENDOCRINOLOGY | Facility: CLINIC | Age: 59
End: 2019-07-08
Payer: MEDICARE

## 2019-07-08 VITALS
BODY MASS INDEX: 43.4 KG/M2 | SYSTOLIC BLOOD PRESSURE: 140 MMHG | DIASTOLIC BLOOD PRESSURE: 60 MMHG | HEIGHT: 69 IN | HEART RATE: 88 BPM | WEIGHT: 293 LBS

## 2019-07-08 DIAGNOSIS — IMO0002 UNCONTROLLED TYPE 2 DIABETES MELLITUS WITH OPHTHALMIC COMPLICATION, WITH LONG-TERM CURRENT USE OF INSULIN: ICD-10-CM

## 2019-07-08 DIAGNOSIS — R51.9 FREQUENT HEADACHES: Primary | ICD-10-CM

## 2019-07-08 DIAGNOSIS — I10 ESSENTIAL HYPERTENSION: ICD-10-CM

## 2019-07-08 DIAGNOSIS — G47.33 MILD OBSTRUCTIVE SLEEP APNEA: ICD-10-CM

## 2019-07-08 DIAGNOSIS — E11.65 TYPE 2 DIABETES MELLITUS WITH HYPERGLYCEMIA, UNSPECIFIED WHETHER LONG TERM INSULIN USE (HCC): ICD-10-CM

## 2019-07-08 DIAGNOSIS — R80.9 MICROALBUMINURIA: ICD-10-CM

## 2019-07-08 DIAGNOSIS — E78.49 OTHER HYPERLIPIDEMIA: ICD-10-CM

## 2019-07-08 PROCEDURE — 99215 OFFICE O/P EST HI 40 MIN: CPT | Performed by: PHYSICIAN ASSISTANT

## 2019-07-08 RX ORDER — BLOOD SUGAR DIAGNOSTIC
STRIP MISCELLANEOUS
Qty: 300 EACH | Refills: 1 | Status: SHIPPED | OUTPATIENT
Start: 2019-07-08 | End: 2019-11-12 | Stop reason: SDUPTHER

## 2019-07-08 RX ORDER — BLOOD-GLUCOSE METER
EACH MISCELLANEOUS
Qty: 1 KIT | Refills: 1 | Status: SHIPPED | OUTPATIENT
Start: 2019-07-08 | End: 2020-09-01 | Stop reason: SDUPTHER

## 2019-07-08 NOTE — PROGRESS NOTES
Established Patient Progress Note      Chief Complaint   Patient presents with    Diabetes Type 2        History of Present Illness:   Mary Ann Dumont is a 59 y o  female with a history of type 2 diabetes with long term use of insulin since many years ago  Reports complications of retinopathy and nephropathy  Denies recent illness or hospitalizations  Denies recent severe hypoglycemic or severe hyperglycemic episodes  Denies any issues with her current regimen  home glucose monitoring: are performed regularly on average once per day  Recently blood sugars have been high overall checking about 1x per day and readings frequently in the 300s  Reports forgetting insulin often, can't remember if she took medication  After last visit did well with diet, BG monitoring, and taking insulin  Dx with sleep apnea, not using cpap, breaking out from mask, couldn't get new mask that worked  Having a lot of migraines, would like to see neurologist       Current regimen:   Basaglar 50 units at dinner time     Humalog 20 with meals  Has been off metformin, ran out of medication, blood sugars are better when she takes metformin from her     Last Eye Exam: UTD  Last Foot Exam: UTD    Has hypertension: Taking losartan, diltiazem, chlorthalidone  Has hyperlipidemia: Off Atorvastatin        Patient Active Problem List   Diagnosis    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, with long-term current use of insulin ([address] )    Hypertension    Seizures ([address] )    Exertional dyspnea    Enlarged pulmonary artery (HCC)    Aortic dilatation (HCC)    Anemia    Decreased pulses in feet    Diabetes mellitus type 2 with complications, uncontrolled ([address] )    Hyperlipidemia    Microalbuminuria    Mild obstructive sleep apnea    Class 3 severe obesity with serious comorbidity and body mass index (BMI) of 45 0 to 49 9 in adult (HCC)    Peripheral neuropathy    Prolonged QT interval    Visual impairment in both eyes    Vitamin D deficiency    Lung nodules    Orthostatic hypotension    Mild intermittent asthma with exacerbation    Type 2 diabetes mellitus with microalbuminuria, with long-term current use of insulin (HCC)    Frequent headaches      Past Medical History:   Diagnosis Date    Anemia     Arthritis     Diabetes mellitus ([facility] [address] )     Dyspnea on exertion     Hyperlipidemia     Hypertension     Legally blind 2010    Mild intermittent asthma with exacerbation 8/4/2018    Miscarriage     x 3    Seizures ([facility] [address] )     Sickle cell trait ([facility] )     Sleep apnea       Past Surgical History:   Procedure Laterality Date    CATARACT EXTRACTION Bilateral     august and september    EYE SURGERY      HYSTERECTOMY      42    OOPHORECTOMY Left     42      Family History   Problem Relation Age of Onset    Heart attack Mother     Heart disease Mother     Hypertension Mother     No Known Problems Father     Heart disease Sister     No Known Problems Brother     No Known Problems Son     No Known Problems Daughter     Heart attack Maternal Grandmother     Heart disease Maternal Grandmother     Diabetes Other     Heart attack Other     Cancer Neg Hx     Stroke Neg Hx      Social History     Tobacco Use    Smoking status: Never Smoker    Smokeless tobacco: Never Used   Substance Use Topics    Alcohol use: No     Allergies   Allergen Reactions    2,4-D Dimethylamine (Amisol)      Patient unsure why this is here          Current Outpatient Medications:     albuterol (PROVENTIL HFA) 90 mcg/act inhaler, Inhale 2 puffs every 6 (six) hours as needed for wheezing For another 24 hours use every 4 hours standing, Disp: 6 7 g, Rfl: 0    aspirin 81 MG tablet, Take 1 tablet by mouth daily, Disp: , Rfl:     atorvastatin (LIPITOR) 40 mg tablet, Take 1 tablet (40 mg total) by mouth daily, Disp: 90 tablet, Rfl: 1    BASAGLAR KWIKPEN 100 units/mL injection pen, 30U BID, Disp: 5 pen, Rfl: 3    SARAH MICROLET LANCETS lancets, Inject 1 applicator into the skin 4 (four) times a day, Disp: , Rfl:     Blood Pressure Monitor KIT, Check blood pressures BID, Disp: 1 each, Rfl: 0    budesonide-formoterol (SYMBICORT) 80-4 5 MCG/ACT inhaler, Inhale 2 puffs 2 (two) times a day Rinse mouth after use  (Patient taking differently: Inhale 2 puffs as needed Rinse mouth after use ), Disp: 1 Inhaler, Rfl: 5    chlorthalidone 25 mg tablet, Take 1 tablet (25 mg total) by mouth daily, Disp: 30 tablet, Rfl: 5    Cholecalciferol (VITAMIN D3) 3000 units TABS, Take by mouth daily , Disp: , Rfl:     CONTOUR NEXT TEST test strip, Test blood sugar 3x per day dx E11 9, Disp: 300 each, Rfl: 1    diltiazem (TIAZAC) 420 MG 24 hr capsule, Take 1 capsule (420 mg total) by mouth daily, Disp: 30 capsule, Rfl: 5    gabapentin (NEURONTIN) 100 mg capsule, Take 1 capsule (100 mg total) by mouth 2 (two) times a day, Disp: 180 capsule, Rfl: 1    insulin lispro (HUMALOG KWIKPEN) 100 units/mL injection pen, INJECT 10 UNITS UNDER THE SKIN BEFORE BREAKFAST AND 6 UNITS BEFORE LUNCH AND 14 UNITS BEFORE DINNER, Disp: 5 pen, Rfl: 1    Insulin Pen Needle (BD PEN NEEDLE DERRICK U/F) 32G X 4 MM MISC, Inject 1 applicator under the skin 4 (four) times a day, Disp: , Rfl:     Blood Glucose Monitoring Suppl (CONTOUR NEXT MONITOR) w/Device KIT, Disp 1 meter dx E11 9, may submitted any contour next meter   If not covered let us know we can change strips, Disp: 1 kit, Rfl: 1    Injection Device for Insulin (INPEN 854-JJVW-[id number]) ERIC, Disp 1 pen, Disp: 1 each, Rfl: 1    Insulin Lispro (HUMALOG) 100 units/mL cartridge for injection, Inject 20 Units under the skin 3 (three) times a day with meals for 60 days, Disp: 6 Cartridge, Rfl: 1    losartan (COZAAR) 100 MG tablet, Take 1 tablet (100 mg total) by mouth daily (Patient not taking: Reported on 7/8/2019), Disp: 30 tablet, Rfl: 5    metFORMIN (GLUCOPHAGE) 500 mg tablet, 2 tabs daily with supper, Disp: 180 tablet, Rfl: 1    Review of Systems   Constitutional: Positive for fatigue and unexpected weight change (gain)  Negative for activity change, appetite change, chills, diaphoresis and fever  HENT: Negative for trouble swallowing and voice change  Eyes: Negative for visual disturbance  Respiratory: Negative for shortness of breath  Cardiovascular: Negative for chest pain and palpitations  Gastrointestinal: Negative for abdominal pain, constipation and diarrhea  Endocrine: Negative for cold intolerance, heat intolerance, polydipsia, polyphagia and polyuria  Genitourinary: Negative for frequency and menstrual problem  Musculoskeletal: Negative for arthralgias and myalgias  Skin: Negative for rash  Allergic/Immunologic: Negative for food allergies  Neurological: Positive for headaches  Negative for dizziness and tremors  Hematological: Negative for adenopathy  Psychiatric/Behavioral: Negative for sleep disturbance  All other systems reviewed and are negative  Physical Exam:  Body mass index is 48 67 kg/m²  /60   Pulse 88   Ht 5' 8 5" (1 74 m)   Wt (!) 147 kg (324 lb 12 8 oz)   BMI 48 67 kg/m²    Wt Readings from Last 3 Encounters:   07/08/19 (!) 147 kg (324 lb 12 8 oz)   06/20/19 (!) 145 kg (319 lb 6 4 oz)   04/03/19 (!) 145 kg (319 lb)       Physical Exam   Constitutional: She is oriented to person, place, and time  She appears well-developed and well-nourished  No distress  HENT:   Head: Normocephalic and atraumatic  Eyes: Pupils are equal, round, and reactive to light  Conjunctivae are normal    Neck: Normal range of motion  Neck supple  No thyromegaly present  Cardiovascular: Normal rate, regular rhythm and normal heart sounds  Pulmonary/Chest: Effort normal and breath sounds normal  No respiratory distress  She has no wheezes  She has no rales  Abdominal: Soft  Bowel sounds are normal  She exhibits no distension  There is no tenderness     Musculoskeletal: Normal range of motion  She exhibits no edema  Neurological: She is alert and oriented to person, place, and time  Skin: Skin is warm and dry  Psychiatric: She has a normal mood and affect  Vitals reviewed  Labs:   Lab Results   Component Value Date    HGBA1C 9 9 (A) 04/03/2019    HGBA1C 10 7 (H) 01/03/2019    HGBA1C 11 1 (H) 06/25/2018     Lab Results   Component Value Date    CREATININE 1 10 03/14/2019    CREATININE 1 18 01/07/2019    CREATININE 0 94 01/03/2019    BUN 26 (H) 03/14/2019     01/04/2016    K 4 1 03/14/2019     03/14/2019    CO2 29 03/14/2019     eGFR   Date Value Ref Range Status   03/14/2019 63 ml/min/1 73sq m Final     Lab Results   Component Value Date    CHOL 130 10/08/2015    HDL 46 01/03/2019    HDL 41 01/03/2019    TRIG 93 01/03/2019     Lab Results   Component Value Date    ALT 23 01/03/2019    AST 20 01/03/2019    ALKPHOS 159 (H) 01/03/2019    BILITOT 0 69 01/04/2016     Lab Results   Component Value Date    DYD9TBABBEOH 1 680 01/03/2019    YGI5FRAKTQTO 1 680 06/24/2018    QFJ6MSWPXCDR 1 850 09/08/2016     Lab Results   Component Value Date    FREET4 1 04 01/03/2019       Impression & Plan:    Problem List Items Addressed This Visit        Endocrine    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, with long-term current use of insulin ([address] )     Poorly Controlled/Not at goal based on last A1C of 9 9 and review of glucometer with blood sugar readings frequently in the 300s  After last visit she was doing much better with taking medication and with dietary modifications however recently she has not been doing a good job  She reports having a hard time following diet and remember if she takes her insulin  Due to being on intensive insulin therapy,  she at high risk of severe hypoglycemia  Severe hypoglycemia can result in falls, injury, coma, seizure, or death      Discussed option of InPen, a smart Pen that she can use with an Vipin to help track blood sugars and insulin doses and she would be interested if insurance will cover  RX sent for inpen and humalog cartridges  For now, advised her to track BG readings and Insulin doses on log sheet and send log for review in two weeks  Also she has been off metformin-- rx sent to pharmacy  Advised her to call office If ever runs out of meds, supplies, etc           Relevant Medications    Injection Device for Insulin (INPEN 100-PINK-CATALINA) ERIC    Insulin Lispro (HUMALOG) 100 units/mL cartridge for injection    Blood Glucose Monitoring Suppl (CONTOUR NEXT MONITOR) w/Device KIT    metFORMIN (GLUCOPHAGE) 500 mg tablet    Other Relevant Orders    Ambulatory referral to Diabetic Education       Respiratory    Mild obstructive sleep apnea     Advised her to follow up with sleep specialist to get equipment that she can can tolerate  Cardiovascular and Mediastinum    Hypertension     Not at goal today but well controlled at past two medical visits and on home BP logs  Will monitor  Other    Hyperlipidemia     No longer on statin ? Reason, will send RX to [facility]  Advised her to complete lab testing as ordered which includes lipid panel  Discussed importance of carrying accurate medication list with her at all times  Relevant Medications    atorvastatin (LIPITOR) 40 mg tablet    Microalbuminuria     Continue ARB  Frequent headaches - Primary     She is requesting referral to neurology  I have placed referral but advised her to follow up with family physician to start evaluation            Relevant Orders    Ambulatory referral to Neurology      Other Visit Diagnoses     Type 2 diabetes mellitus with hyperglycemia, unspecified whether long term insulin use (HCC)        Relevant Medications    Insulin Lispro (HUMALOG) 100 units/mL cartridge for injection    metFORMIN (GLUCOPHAGE) 500 mg tablet    CONTOUR NEXT TEST test strip          Orders Placed This Encounter   Procedures    Ambulatory referral to Neurology     Standing Status:   Future     Standing Expiration Date:   1/8/2020     Referral Priority:   Routine     Referral Type:   Consult - AMB     Referral Reason:   Specialty Services Required     Requested Specialty:   Neurology     Number of Visits Requested:   1     Expiration Date:   7/8/2020    Ambulatory referral to Diabetic Education     Standing Status:   Future     Standing Expiration Date:   1/8/2020     Referral Priority:   Routine     Referral Type:   Consult - AMB     Referral Reason:   Specialty Services Required     Requested Specialty:   Diabetes Services     Number of Visits Requested:   1     Expiration Date:   7/8/2020       There are no Patient Instructions on file for this visit  Discussed with the patient and all questioned fully answered  She will call me if any problems arise  Follow-up appointment in 1-2 months       Counseled patient on diagnostic results, prognosis, risk and benefit of treatment options, instruction for management, importance of treatment compliance, Risk  factor reduction and impressions    No Ward PA-C

## 2019-07-09 ENCOUNTER — TELEPHONE (OUTPATIENT)
Dept: ENDOCRINOLOGY | Facility: CLINIC | Age: 59
End: 2019-07-09

## 2019-07-09 PROBLEM — R51.9 FREQUENT HEADACHES: Status: ACTIVE | Noted: 2019-07-09

## 2019-07-09 RX ORDER — ATORVASTATIN CALCIUM 40 MG/1
40 TABLET, FILM COATED ORAL DAILY
Qty: 90 TABLET | Refills: 1 | Status: SHIPPED | OUTPATIENT
Start: 2019-07-09 | End: 2019-12-08 | Stop reason: SDUPTHER

## 2019-07-09 NOTE — ASSESSMENT & PLAN NOTE
She is requesting referral to neurology  I have placed referral but advised her to follow up with family physician to start evaluation

## 2019-07-09 NOTE — ASSESSMENT & PLAN NOTE
Poorly Controlled/Not at goal based on last A1C of 9 9 and review of glucometer with blood sugar readings frequently in the 300s  After last visit she was doing much better with taking medication and with dietary modifications however recently she has not been doing a good job  She reports having a hard time following diet and remember if she takes her insulin  Due to being on intensive insulin therapy,  she at high risk of severe hypoglycemia  Severe hypoglycemia can result in falls, injury, coma, seizure, or death  Discussed option of InPen, a smart Pen that she can use with an Vipin to help track blood sugars and insulin doses and she would be interested if insurance will cover  RX sent for inpen and humalog cartridges  For now, advised her to track BG readings and Insulin doses on log sheet and send log for review in two weeks  Also she has been off metformin-- rx sent to pharmacy   Advised her to call office If ever runs out of meds, supplies, etc

## 2019-07-09 NOTE — ASSESSMENT & PLAN NOTE
No longer on statin ? Reason, will send RX to [facility]  Advised her to complete lab testing as ordered which includes lipid panel  Discussed importance of carrying accurate medication list with her at all times

## 2019-07-09 NOTE — TELEPHONE ENCOUNTER
Was it not covered? I'm pretty sure she was using humalog kwikpen 20 units TID Before each meal please verify with patient, she was unsure of her meds yesterday but that's what we have in chart   thanks

## 2019-07-10 ENCOUNTER — TELEPHONE (OUTPATIENT)
Dept: ENDOCRINOLOGY | Facility: CLINIC | Age: 59
End: 2019-07-10

## 2019-07-17 NOTE — TELEPHONE ENCOUNTER
Tried to call pt for the second time to find out how she takes her insulin  I left a message for her to call me

## 2019-07-18 ENCOUNTER — HOSPITAL ENCOUNTER (OUTPATIENT)
Dept: MAMMOGRAPHY | Facility: CLINIC | Age: 59
Discharge: HOME | End: 2019-07-18
Payer: COMMERCIAL

## 2019-07-18 ENCOUNTER — HOSPITAL ENCOUNTER (OUTPATIENT)
Dept: ULTRASOUND IMAGING | Facility: CLINIC | Age: 59
Discharge: HOME | End: 2019-07-18
Payer: COMMERCIAL

## 2019-07-18 VITALS — HEIGHT: 69 IN | WEIGHT: 293 LBS | BODY MASS INDEX: 43.4 KG/M2

## 2019-07-18 DIAGNOSIS — R92.8 ABNORMAL MAMMOGRAM: ICD-10-CM

## 2019-07-18 PROCEDURE — 76642 ULTRASOUND BREAST LIMITED: CPT

## 2019-07-18 PROCEDURE — 77066 DX MAMMO INCL CAD BI: CPT

## 2019-07-18 PROCEDURE — G0279 TOMOSYNTHESIS, MAMMO: HCPCS

## 2019-08-05 ENCOUNTER — TELEPHONE (OUTPATIENT)
Dept: ENDOCRINOLOGY | Facility: CLINIC | Age: 59
End: 2019-08-05

## 2019-08-05 NOTE — TELEPHONE ENCOUNTER
OK- if not can refill 30 day supply at whatever the prior RX on file says and we can confirm for sure at visit

## 2019-08-05 NOTE — TELEPHONE ENCOUNTER
Pharmacy called wanting refills for Simone Budds  I never heard back from her as to how she uses the Humalog pens    I told the pharmacy I would call tomorrow so I can verify with Edie/you how she uses it

## 2019-08-06 DIAGNOSIS — IMO0002 UNCONTROLLED TYPE 2 DIABETES MELLITUS WITH OPHTHALMIC COMPLICATION, WITH LONG-TERM CURRENT USE OF INSULIN: ICD-10-CM

## 2019-08-22 ENCOUNTER — APPOINTMENT (OUTPATIENT)
Dept: LAB | Facility: CLINIC | Age: 59
End: 2019-08-22
Payer: COMMERCIAL

## 2019-08-22 ENCOUNTER — OFFICE VISIT (OUTPATIENT)
Dept: ENDOCRINOLOGY | Facility: CLINIC | Age: 59
End: 2019-08-22
Payer: COMMERCIAL

## 2019-08-22 VITALS
DIASTOLIC BLOOD PRESSURE: 74 MMHG | HEART RATE: 86 BPM | BODY MASS INDEX: 43.4 KG/M2 | HEIGHT: 69 IN | SYSTOLIC BLOOD PRESSURE: 130 MMHG | WEIGHT: 293 LBS

## 2019-08-22 DIAGNOSIS — E78.5 HYPERLIPIDEMIA, UNSPECIFIED HYPERLIPIDEMIA TYPE: ICD-10-CM

## 2019-08-22 DIAGNOSIS — I10 ESSENTIAL HYPERTENSION: ICD-10-CM

## 2019-08-22 DIAGNOSIS — IMO0002 UNCONTROLLED TYPE 2 DIABETES MELLITUS WITH OPHTHALMIC COMPLICATION, WITH LONG-TERM CURRENT USE OF INSULIN: Primary | ICD-10-CM

## 2019-08-22 DIAGNOSIS — E11.65 TYPE 2 DIABETES MELLITUS WITH HYPERGLYCEMIA, UNSPECIFIED WHETHER LONG TERM INSULIN USE (HCC): ICD-10-CM

## 2019-08-22 LAB
25(OH)D3 SERPL-MCNC: 40.2 NG/ML (ref 30–100)
ANION GAP SERPL CALCULATED.3IONS-SCNC: 5 MMOL/L (ref 4–13)
BUN SERPL-MCNC: 30 MG/DL (ref 5–25)
CALCIUM SERPL-MCNC: 9.5 MG/DL (ref 8.3–10.1)
CHLORIDE SERPL-SCNC: 100 MMOL/L (ref 100–108)
CO2 SERPL-SCNC: 30 MMOL/L (ref 21–32)
CREAT SERPL-MCNC: 1.27 MG/DL (ref 0.6–1.3)
CREAT UR-MCNC: 99.2 MG/DL
EST. AVERAGE GLUCOSE BLD GHB EST-MCNC: 372 MG/DL
GFR SERPL CREATININE-BSD FRML MDRD: 53 ML/MIN/1.73SQ M
GLUCOSE SERPL-MCNC: 236 MG/DL (ref 65–140)
HBA1C MFR BLD: 14.6 % (ref 4.2–6.3)
MICROALBUMIN UR-MCNC: 63.5 MG/L (ref 0–20)
MICROALBUMIN/CREAT 24H UR: 64 MG/G CREATININE (ref 0–30)
POTASSIUM SERPL-SCNC: 3.2 MMOL/L (ref 3.5–5.3)
SODIUM SERPL-SCNC: 135 MMOL/L (ref 136–145)

## 2019-08-22 PROCEDURE — 82306 VITAMIN D 25 HYDROXY: CPT | Performed by: INTERNAL MEDICINE

## 2019-08-22 PROCEDURE — 82043 UR ALBUMIN QUANTITATIVE: CPT | Performed by: INTERNAL MEDICINE

## 2019-08-22 PROCEDURE — 83036 HEMOGLOBIN GLYCOSYLATED A1C: CPT | Performed by: INTERNAL MEDICINE

## 2019-08-22 PROCEDURE — 99215 OFFICE O/P EST HI 40 MIN: CPT | Performed by: PHYSICIAN ASSISTANT

## 2019-08-22 PROCEDURE — 36415 COLL VENOUS BLD VENIPUNCTURE: CPT | Performed by: INTERNAL MEDICINE

## 2019-08-22 PROCEDURE — 82570 ASSAY OF URINE CREATININE: CPT | Performed by: INTERNAL MEDICINE

## 2019-08-22 PROCEDURE — 80048 BASIC METABOLIC PNL TOTAL CA: CPT | Performed by: INTERNAL MEDICINE

## 2019-08-22 PROCEDURE — 3075F SYST BP GE 130 - 139MM HG: CPT | Performed by: PHYSICIAN ASSISTANT

## 2019-08-22 RX ORDER — INSULIN GLARGINE 100 [IU]/ML
INJECTION, SOLUTION SUBCUTANEOUS
Qty: 5 PEN | Refills: 3 | Status: ON HOLD
Start: 2019-08-22 | End: 2019-09-26 | Stop reason: SDUPTHER

## 2019-08-22 NOTE — ASSESSMENT & PLAN NOTE
Severely Uncontrolled with average glucose 434 on home glucose monitoring with all readings above 200  Discussed need for better control ASAP due to risk of severe hyperglycemia acutely and long term complications  She is often skipping meals 1-2x per day so missing 1-2 doses of humalog per day  For now Advised her to Increase basaglar to 40 units twice per day  Increase Humalog to 35 units before meals and take 40 if BG over 200  If skipping a meal, take Humalog every 4 hours as needed according to scale listed in patient instructions  Refer to dietician for medical nutrition therapy, avoid skipping meals  Keep well hydrated with non-sugary beverages  Check BG 4x per day and send log in 1 week and weekly for continued insulin adjustments  Increase metformin to 1000mg twice per day  Do lab testing Today that was ordered at last visit

## 2019-08-22 NOTE — PATIENT INSTRUCTIONS
Do lab testing as ordered ASAP  Increase basaglar to 40 units twice per day day  Increase Humalog to 35 units before meals (If BG over 200, take 40 units)  Increase metformin to 1000mg twice per day  If you are skipping a meal, please check blood sugar and take the dose of humalog below- can be taken every 4 hours if needed     200-250: 4 units  251-300: 6 units  301-350: 8 units  Over 350: 10 units

## 2019-08-22 NOTE — PROGRESS NOTES
Established Patient Progress Note      Chief Complaint   Patient presents with    Diabetes Type 2        History of Present Illness:   Sarah Beth Max is a 59 y o  female with a history of type 2 diabetes with long term use of insulin since 1  Reports complications of retinopathy, neuropathy, and nephropathy  Denies recent illness or hospitalizations  Denies any issues with her current regimen  home glucose monitoring: are performed regularly  Blood sugars have been high with average glucose 432 on glucometer download  Karri't been working, helping her  who has an amputations, has had financial struggles and eating whatever is in the house  Her  buys a lot of high carb foods  Often skips 1-2 meals per day  Continues with Headaches pressure in head and dizziness and balance-- has appt with neurology scheduled in September and with PCP next week  Excessive thrist, blurry vision, urine    Current regimen:   Basaglar 30 units twice per day  Humalog 30 before meals  Skips when skipping meals, 1-2 meals per day  Was off humalog 3 weeks ago- back on humalog for the past two weeks  Taking Metformin 3 per day       Last Eye Exam: UTD  Last Foot Exam: UTD    Has hypertension: Taking losartan, diltiazem,chlorthalidone  Has hyperlipidemia: Taking atorvastatin      Patient Active Problem List   Diagnosis    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, with long-term current use of insulin ([address] )    Hypertension    Seizures ([address] )    Exertional dyspnea    Enlarged pulmonary artery (HCC)    Aortic dilatation (HCC)    Anemia    Decreased pulses in feet    Diabetes mellitus type 2 with complications, uncontrolled (HCC)    Hyperlipidemia    Microalbuminuria    Mild obstructive sleep apnea    Class 3 severe obesity with serious comorbidity and body mass index (BMI) of 45 0 to 49 9 in adult (HCC)    Peripheral neuropathy    Prolonged QT interval    Visual impairment in both eyes    Vitamin D deficiency    Lung nodules    Orthostatic hypotension    Mild intermittent asthma with exacerbation    Type 2 diabetes mellitus with microalbuminuria, with long-term current use of insulin (HCC)    Frequent headaches      Past Medical History:   Diagnosis Date    Anemia     Arthritis     [facility]     Dyspnea on exertion     Hyperlipidemia     Hypertension     Legally blind 2010    Mild intermittent asthma with exacerbation 8/4/2018    Miscarriage     x 3    Seizures (HCC)     Sickle cell trait [facility] [facility] )     Sleep apnea       Past Surgical History:   Procedure Laterality Date    CATARACT EXTRACTION Bilateral     august and september    EYE SURGERY      HYSTERECTOMY      44    OOPHORECTOMY Left     44      Family History   Problem Relation Age of Onset    Heart attack Mother     Heart disease Mother     Hypertension Mother     No Known Problems Father     Heart disease Sister     No Known Problems Brother     No Known Problems Son     No Known Problems Daughter     Heart attack Maternal Grandmother     Heart disease Maternal Grandmother     Diabetes Other     Heart attack Other     No Known Problems Sister     No Known Problems Sister     No Known Problems Paternal Aunt     No Known Problems Son     Cancer Neg Hx     Stroke Neg Hx      Social History     Tobacco Use    Smoking status: Never Smoker    Smokeless tobacco: Never Used   Substance Use Topics    Alcohol use: No     Allergies   Allergen Reactions    2,4-D Dimethylamine (Amisol)      Patient unsure why this is here          Current Outpatient Medications:     albuterol (PROVENTIL HFA) 90 mcg/act inhaler, Inhale 2 puffs every 6 (six) hours as needed for wheezing For another 24 hours use every 4 hours standing, Disp: 6 7 g, Rfl: 0    aspirin 81 MG tablet, Take 1 tablet by mouth daily, Disp: , Rfl:     atorvastatin (LIPITOR) 40 mg tablet, Take 1 tablet (40 mg total) by mouth daily, Disp: 90 tablet, Rfl: 1    BASAGLAR KWIKPEN 100 units/mL injection pen, 40 units twice per day, Disp: 5 pen, Rfl: 3    SARAH MICROLET LANCETS lancets, Inject 1 applicator into the skin 4 (four) times a day, Disp: , Rfl:     Blood Glucose Monitoring Suppl (CONTOUR NEXT MONITOR) w/Device KIT, Disp 1 meter dx E11 9, may submitted any contour next meter  If not covered let us know we can change strips, Disp: 1 kit, Rfl: 1    Blood Pressure Monitor KIT, Check blood pressures BID, Disp: 1 each, Rfl: 0    budesonide-formoterol (SYMBICORT) 80-4 5 MCG/ACT inhaler, Inhale 2 puffs 2 (two) times a day Rinse mouth after use  (Patient taking differently: Inhale 2 puffs as needed Rinse mouth after use ), Disp: 1 Inhaler, Rfl: 5    chlorthalidone 25 mg tablet, Take 1 tablet (25 mg total) by mouth daily, Disp: 30 tablet, Rfl: 5    Cholecalciferol (VITAMIN D3) 3000 units TABS, Take by mouth daily , Disp: , Rfl:     CONTOUR NEXT TEST test strip, Test blood sugar 3x per day dx E11 9, Disp: 300 each, Rfl: 1    diltiazem (TIAZAC) 420 MG 24 hr capsule, Take 1 capsule (420 mg total) by mouth daily, Disp: 30 capsule, Rfl: 5    gabapentin (NEURONTIN) 100 mg capsule, Take 1 capsule (100 mg total) by mouth 2 (two) times a day, Disp: 180 capsule, Rfl: 1    insulin lispro (HUMALOG KWIKPEN) 100 units/mL injection pen, 35 units before meals or 40 if BG over 200, Disp: 5 pen, Rfl: 1    Insulin Pen Needle (BD PEN NEEDLE DERRICK U/F) 32G X 4 MM MISC, Inject 1 applicator under the skin 4 (four) times a day, Disp: , Rfl:     losartan (COZAAR) 100 MG tablet, Take 1 tablet (100 mg total) by mouth daily, Disp: 30 tablet, Rfl: 5    metFORMIN (GLUCOPHAGE) 500 mg tablet, Take 2 tablets (1,000 mg total) by mouth 2 (two) times a day with meals for 90 days 2 tabs daily with supper, Disp: 360 tablet, Rfl: 1    Review of Systems   Constitutional: Positive for fatigue   Negative for activity change, appetite change, chills, diaphoresis, fever and unexpected weight change  HENT: Negative for trouble swallowing and voice change  Eyes: Positive for visual disturbance  Respiratory: Negative for shortness of breath  Cardiovascular: Negative for chest pain and palpitations  Gastrointestinal: Negative for abdominal pain, constipation and diarrhea  Endocrine: Positive for polydipsia and polyphagia  Negative for cold intolerance, heat intolerance and polyuria  Genitourinary: Negative for frequency and menstrual problem  Musculoskeletal: Negative for arthralgias and myalgias  Skin: Negative for rash  Allergic/Immunologic: Negative for food allergies  Neurological: Positive for dizziness and headaches  Negative for tremors  Hematological: Negative for adenopathy  Psychiatric/Behavioral: Negative for sleep disturbance  All other systems reviewed and are negative  Physical Exam:  Body mass index is 47 64 kg/m²  /74   Pulse 86   Ht 5' 8 5" (1 74 m)   Wt (!) 144 kg (318 lb)   BMI 47 64 kg/m²    Wt Readings from Last 3 Encounters:   08/22/19 (!) 144 kg (318 lb)   07/18/19 (!) 147 kg (324 lb)   07/08/19 (!) 147 kg (324 lb 12 8 oz)       Physical Exam   Constitutional: She is oriented to person, place, and time  She appears well-developed and well-nourished  No distress  HENT:   Head: Normocephalic and atraumatic  Eyes: Pupils are equal, round, and reactive to light  Conjunctivae are normal    Neck: Normal range of motion  Neck supple  No thyromegaly present  Cardiovascular: Normal rate, regular rhythm and normal heart sounds  Pulmonary/Chest: Effort normal and breath sounds normal  No respiratory distress  She has no wheezes  She has no rales  Abdominal: Soft  Bowel sounds are normal  She exhibits no distension  There is no tenderness  Musculoskeletal: Normal range of motion  She exhibits no edema  Neurological: She is alert and oriented to person, place, and time  Skin: Skin is warm and dry     Psychiatric: She has a normal mood and affect  Vitals reviewed  Labs:   Lab Results   Component Value Date    HGBA1C 9 9 (A) 04/03/2019    HGBA1C 10 7 (H) 01/03/2019    HGBA1C 11 1 (H) 06/25/2018     Lab Results   Component Value Date    CREATININE 1 27 08/22/2019    CREATININE 1 10 03/14/2019    CREATININE 1 18 01/07/2019    BUN 30 (H) 08/22/2019     01/04/2016    K 3 2 (L) 08/22/2019     08/22/2019    CO2 30 08/22/2019     eGFR   Date Value Ref Range Status   08/22/2019 53 ml/min/1 73sq m Final     Lab Results   Component Value Date    CHOL 130 10/08/2015    HDL 46 01/03/2019    HDL 41 01/03/2019    TRIG 93 01/03/2019     Lab Results   Component Value Date    ALT 23 01/03/2019    AST 20 01/03/2019    ALKPHOS 159 (H) 01/03/2019    BILITOT 0 69 01/04/2016     Lab Results   Component Value Date    HWH6YAGXBVNC 1 680 01/03/2019    VPW7OYCYQYDD 1 680 06/24/2018    TDN4ADPXAAZD 1 850 09/08/2016     Lab Results   Component Value Date    FREET4 1 04 01/03/2019       Impression & Plan:    Problem List Items Addressed This Visit        Endocrine    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, with long-term current use of insulin ([address] ) - Primary     Severely Uncontrolled with average glucose 434 on home glucose monitoring with all readings above 200  Discussed need for better control ASAP due to risk of severe hyperglycemia acutely and long term complications  She is often skipping meals 1-2x per day so missing 1-2 doses of humalog per day  For now Advised her to Increase basaglar to 40 units twice per day  Increase Humalog to 35 units before meals and take 40 if BG over 200  If skipping a meal, take Humalog every 4 hours as needed according to scale listed in patient instructions  Refer to dietician for medical nutrition therapy, avoid skipping meals  Keep well hydrated with non-sugary beverages    Check BG 4x per day and send log in 1 week and weekly for continued insulin adjustments  Increase metformin to 1000mg twice per day  Do lab testing Today that was ordered at last visit  Relevant Medications    insulin lispro (HUMALOG KWIKPEN) 100 units/mL injection pen    BASAGLAR KWIKPEN 100 units/mL injection pen    metFORMIN (GLUCOPHAGE) 500 mg tablet    Other Relevant Orders    Ambulatory referral to medical nutrition therapy for diabetes       Cardiovascular and Mediastinum    Hypertension     Well controlled  Other    Hyperlipidemia     Continue atorvastatin  Other Visit Diagnoses     Type 2 diabetes mellitus with hyperglycemia, unspecified whether long term insulin use (HCC)        Relevant Medications    insulin lispro (HUMALOG KWIKPEN) 100 units/mL injection pen    BASAGLAR KWIKPEN 100 units/mL injection pen    metFORMIN (GLUCOPHAGE) 500 mg tablet          Orders Placed This Encounter   Procedures    Ambulatory referral to medical nutrition therapy for diabetes     Standing Status:   Future     Standing Expiration Date:   2/22/2020     Referral Priority:   Routine     Referral Type:   Consult - AMB     Referral Reason:   Specialty Services Required     Requested Specialty:   Endocrinology     Number of Visits Requested:   1     Expiration Date:   8/22/2020       Patient Instructions   Do lab testing as ordered ASAP  Increase basaglar to 40 units twice per day day  Increase Humalog to 35 units before meals (If BG over 200, take 40 units)  Increase metformin to 1000mg twice per day  If you are skipping a meal, please check blood sugar and take the dose of humalog below- can be taken every 4 hours if needed  200-250: 4 units  251-300: 6 units  301-350: 8 units  Over 350: 10 units      Discussed with the patient and all questioned fully answered  She will call me if any problems arise      Follow-up appointment in 6 weeks    Counseled patient on diagnostic results, prognosis, risk and benefit of treatment options, instruction for management, importance of treatment compliance, Risk  factor reduction and impressions    Shawnie Spurling, PA-C

## 2019-08-24 ENCOUNTER — APPOINTMENT (OUTPATIENT)
Dept: LAB | Facility: HOSPITAL | Age: 59
End: 2019-08-24
Attending: INTERNAL MEDICINE
Payer: COMMERCIAL

## 2019-08-24 ENCOUNTER — TRANSCRIBE ORDERS (OUTPATIENT)
Dept: ADMINISTRATIVE | Facility: HOSPITAL | Age: 59
End: 2019-08-24

## 2019-08-24 DIAGNOSIS — I10 ESSENTIAL HYPERTENSION: ICD-10-CM

## 2019-08-24 LAB
ANION GAP SERPL CALCULATED.3IONS-SCNC: 8 MMOL/L (ref 5–14)
BUN SERPL-MCNC: 31 MG/DL (ref 5–25)
CALCIUM SERPL-MCNC: 9.8 MG/DL (ref 8.4–10.2)
CHLORIDE SERPL-SCNC: 98 MMOL/L (ref 97–108)
CHOLEST SERPL-MCNC: 157 MG/DL
CO2 SERPL-SCNC: 33 MMOL/L (ref 22–30)
CREAT SERPL-MCNC: 1.38 MG/DL (ref 0.6–1.2)
GFR SERPL CREATININE-BSD FRML MDRD: 48 ML/MIN/1.73SQ M
GLUCOSE P FAST SERPL-MCNC: 182 MG/DL (ref 70–99)
HDLC SERPL-MCNC: 37 MG/DL (ref 40–59)
LDLC SERPL CALC-MCNC: 95 MG/DL
POTASSIUM SERPL-SCNC: 3.6 MMOL/L (ref 3.6–5)
SODIUM SERPL-SCNC: 139 MMOL/L (ref 137–147)
TRIGL SERPL-MCNC: 124 MG/DL

## 2019-08-24 PROCEDURE — 80048 BASIC METABOLIC PNL TOTAL CA: CPT

## 2019-08-24 PROCEDURE — 36415 COLL VENOUS BLD VENIPUNCTURE: CPT | Performed by: INTERNAL MEDICINE

## 2019-08-24 PROCEDURE — 80061 LIPID PANEL: CPT | Performed by: INTERNAL MEDICINE

## 2019-09-09 ENCOUNTER — APPOINTMENT (EMERGENCY)
Dept: RADIOLOGY | Facility: HOSPITAL | Age: 59
End: 2019-09-09
Payer: COMMERCIAL

## 2019-09-09 ENCOUNTER — APPOINTMENT (EMERGENCY)
Dept: CT IMAGING | Facility: HOSPITAL | Age: 59
End: 2019-09-09
Payer: COMMERCIAL

## 2019-09-09 ENCOUNTER — TELEPHONE (OUTPATIENT)
Dept: CARDIOLOGY CLINIC | Facility: CLINIC | Age: 59
End: 2019-09-09

## 2019-09-09 ENCOUNTER — HOSPITAL ENCOUNTER (EMERGENCY)
Facility: HOSPITAL | Age: 59
Discharge: HOME | End: 2019-09-09
Attending: EMERGENCY MEDICINE | Admitting: EMERGENCY MEDICINE
Payer: COMMERCIAL

## 2019-09-09 VITALS
BODY MASS INDEX: 45.7 KG/M2 | WEIGHT: 293 LBS | TEMPERATURE: 99.6 F | HEART RATE: 105 BPM | OXYGEN SATURATION: 97 % | RESPIRATION RATE: 18 BRPM | SYSTOLIC BLOOD PRESSURE: 121 MMHG | DIASTOLIC BLOOD PRESSURE: 70 MMHG

## 2019-09-09 DIAGNOSIS — G89.29 CHRONIC NONINTRACTABLE HEADACHE, UNSPECIFIED HEADACHE TYPE: Primary | ICD-10-CM

## 2019-09-09 DIAGNOSIS — R51.9 CHRONIC NONINTRACTABLE HEADACHE, UNSPECIFIED HEADACHE TYPE: Primary | ICD-10-CM

## 2019-09-09 LAB
ALBUMIN SERPL BCP-MCNC: 4 G/DL (ref 3–5.2)
ALP SERPL-CCNC: 168 U/L (ref 43–122)
ALT SERPL W P-5'-P-CCNC: 28 U/L (ref 9–52)
ANION GAP SERPL CALCULATED.3IONS-SCNC: 7 MMOL/L (ref 5–14)
ANISOCYTOSIS BLD QL SMEAR: PRESENT
APTT PPP: 33 SECONDS (ref 25–32)
AST SERPL W P-5'-P-CCNC: 23 U/L (ref 14–36)
BASOPHILS # BLD AUTO: 0.1 THOUSANDS/ΜL (ref 0–0.1)
BASOPHILS NFR BLD AUTO: 1 % (ref 0–1)
BILIRUB SERPL-MCNC: 0.7 MG/DL
BUN SERPL-MCNC: 26 MG/DL (ref 5–25)
CALCIUM SERPL-MCNC: 9.5 MG/DL (ref 8.4–10.2)
CHLORIDE SERPL-SCNC: 95 MMOL/L (ref 97–108)
CO2 SERPL-SCNC: 35 MMOL/L (ref 22–30)
CREAT SERPL-MCNC: 1.09 MG/DL (ref 0.6–1.2)
EOSINOPHIL # BLD AUTO: 0.2 THOUSAND/ΜL (ref 0–0.4)
EOSINOPHIL NFR BLD AUTO: 2 % (ref 0–6)
ERYTHROCYTE [DISTWIDTH] IN BLOOD BY AUTOMATED COUNT: 15.7 %
GFR SERPL CREATININE-BSD FRML MDRD: 64 ML/MIN/1.73SQ M
GLUCOSE SERPL-MCNC: 164 MG/DL (ref 70–99)
HCT VFR BLD AUTO: 32 % (ref 36–46)
HGB BLD-MCNC: 10.4 G/DL (ref 12–16)
HYPERCHROMIA BLD QL SMEAR: PRESENT
INR PPP: 0.91 (ref 0.91–1.09)
LYMPHOCYTES # BLD AUTO: 1.9 THOUSANDS/ΜL (ref 0.5–4)
LYMPHOCYTES NFR BLD AUTO: 14 % (ref 25–45)
MCH RBC QN AUTO: 24.8 PG (ref 26–34)
MCHC RBC AUTO-ENTMCNC: 32.3 G/DL (ref 31–36)
MCV RBC AUTO: 77 FL (ref 80–100)
MICROCYTES BLD QL AUTO: PRESENT
MONOCYTES # BLD AUTO: 0.8 THOUSAND/ΜL (ref 0.2–0.9)
MONOCYTES NFR BLD AUTO: 6 % (ref 1–10)
NEUTROPHILS # BLD AUTO: 10.9 THOUSANDS/ΜL (ref 1.8–7.8)
NEUTS SEG NFR BLD AUTO: 78 % (ref 45–65)
PLATELET # BLD AUTO: 352 THOUSANDS/UL (ref 150–450)
PLATELET BLD QL SMEAR: ADEQUATE
PMV BLD AUTO: 8.2 FL (ref 8.9–12.7)
POIKILOCYTOSIS BLD QL SMEAR: PRESENT
POTASSIUM SERPL-SCNC: 3.7 MMOL/L (ref 3.6–5)
PROT SERPL-MCNC: 7.8 G/DL (ref 5.9–8.4)
PROTHROMBIN TIME: 10 SECONDS (ref 9.8–12)
RBC # BLD AUTO: 4.17 MILLION/UL (ref 4–5.2)
RBC MORPH BLD: PRESENT
SODIUM SERPL-SCNC: 137 MMOL/L (ref 137–147)
TROPONIN I SERPL-MCNC: <0.01 NG/ML (ref 0–0.03)
WBC # BLD AUTO: 14 THOUSAND/UL (ref 4.5–11)

## 2019-09-09 PROCEDURE — 70450 CT HEAD/BRAIN W/O DYE: CPT

## 2019-09-09 PROCEDURE — 96361 HYDRATE IV INFUSION ADD-ON: CPT

## 2019-09-09 PROCEDURE — 36415 COLL VENOUS BLD VENIPUNCTURE: CPT | Performed by: EMERGENCY MEDICINE

## 2019-09-09 PROCEDURE — 96374 THER/PROPH/DIAG INJ IV PUSH: CPT

## 2019-09-09 PROCEDURE — 85610 PROTHROMBIN TIME: CPT | Performed by: EMERGENCY MEDICINE

## 2019-09-09 PROCEDURE — 96375 TX/PRO/DX INJ NEW DRUG ADDON: CPT

## 2019-09-09 PROCEDURE — 93005 ELECTROCARDIOGRAM TRACING: CPT

## 2019-09-09 PROCEDURE — 71045 X-RAY EXAM CHEST 1 VIEW: CPT

## 2019-09-09 PROCEDURE — 84484 ASSAY OF TROPONIN QUANT: CPT | Performed by: EMERGENCY MEDICINE

## 2019-09-09 PROCEDURE — 85730 THROMBOPLASTIN TIME PARTIAL: CPT | Performed by: EMERGENCY MEDICINE

## 2019-09-09 PROCEDURE — 99284 EMERGENCY DEPT VISIT MOD MDM: CPT | Performed by: EMERGENCY MEDICINE

## 2019-09-09 PROCEDURE — 99284 EMERGENCY DEPT VISIT MOD MDM: CPT

## 2019-09-09 PROCEDURE — 85025 COMPLETE CBC W/AUTO DIFF WBC: CPT | Performed by: EMERGENCY MEDICINE

## 2019-09-09 PROCEDURE — 80053 COMPREHEN METABOLIC PANEL: CPT | Performed by: EMERGENCY MEDICINE

## 2019-09-09 RX ORDER — METOCLOPRAMIDE HYDROCHLORIDE 5 MG/ML
10 INJECTION INTRAMUSCULAR; INTRAVENOUS ONCE
Status: COMPLETED | OUTPATIENT
Start: 2019-09-09 | End: 2019-09-09

## 2019-09-09 RX ORDER — DIPHENHYDRAMINE HYDROCHLORIDE 50 MG/ML
25 INJECTION INTRAMUSCULAR; INTRAVENOUS ONCE
Status: COMPLETED | OUTPATIENT
Start: 2019-09-09 | End: 2019-09-09

## 2019-09-09 RX ADMIN — SODIUM CHLORIDE 1000 ML: 0.9 INJECTION, SOLUTION INTRAVENOUS at 18:18

## 2019-09-09 RX ADMIN — DIPHENHYDRAMINE HYDROCHLORIDE 25 MG: 50 INJECTION, SOLUTION INTRAMUSCULAR; INTRAVENOUS at 18:18

## 2019-09-09 RX ADMIN — METOCLOPRAMIDE 10 MG: 5 INJECTION, SOLUTION INTRAMUSCULAR; INTRAVENOUS at 18:17

## 2019-09-09 NOTE — TELEPHONE ENCOUNTER
Received call from patient stated she called PCP who told her to go to Ed but is calling here stating has had "migraine" for past several days getting worse instead of better   inhaled corticosteroids having difficulty moving her arms  Instructed her to go to closest ED as instructed by PCP told her she needs more of an evaluation and testing than can be done in the office   the patient was in agreement of reporting to ED   suggested she call 911 for transport because she is unable to walk there

## 2019-09-09 NOTE — ED PROVIDER NOTES
History  Chief Complaint   Patient presents with    Headache - Recurrent or Known Dx Migraines     pt has had frequent recurrent headaches since 2018, states she's had a HA since 17 reports pressure to her entire head     58 yo morbidly obese female with a complicated past medical history including DM, HTN, asthma, MOHAN, and a seizure disorder presents with multiple complaints including a headache and bilateral arm "stiffness"  The arm stiffness started on Saturday --> the patient says she could "barely lift" her left arm that day  The stiffness has since improved but she is now experiencing similar symptoms on the right  (+) Transient LUE numbness, also now resolved  Headache has been present since the  --> she reports a poorly localized "throbbing" pain  The patient says she has been dealing with migraine headaches intermittently since December and is scheduled to see a new neurologist on the  of this month  No chest pain or shortness of breath  She denies visual disturbances  No N/V  No dizziness/lightheadedness  No diaphoresis  No fevers/chills  No other specific complaints  History provided by:  Patient  Headache - Recurrent or Known Dx Migraines   Pain location:  Generalized  Quality:  Dull  Radiates to:  Does not radiate  Onset quality:  Gradual  Duration:  5 days  Timing:  Constant  Progression:  Unchanged  Chronicity:  Recurrent  Similar to prior headaches: yes    Associated symptoms: numbness and weakness    Associated symptoms: no abdominal pain, no back pain, no diarrhea, no fever, no nausea, no photophobia, no sore throat, no visual change and no vomiting        Prior to Admission Medications   Prescriptions Last Dose Informant Patient Reported? Taking?    BASAGLAR KWIKPEN 100 units/mL injection pen   No No   Si units twice per day   SARAH MICROLET LANCETS lancets  Self Yes No   Sig: Inject 1 applicator into the skin 4 (four) times a day   Blood Glucose Monitoring Suppl (CONTOUR NEXT MONITOR) w/Device KIT   No No   Sig: Disp 1 meter dx E11 9, may submitted any contour next meter  If not covered let us know we can change strips   Blood Pressure Monitor KIT  Self No No   Sig: Check blood pressures BID   CONTOUR NEXT TEST test strip   No No   Sig: Test blood sugar 3x per day dx E11 9   Cholecalciferol (VITAMIN D3) 3000 units TABS  Self Yes No   Sig: Take by mouth daily    Insulin Pen Needle (BD PEN NEEDLE DERRICK U/F) 32G X 4 MM MISC  Self Yes No   Sig: Inject 1 applicator under the skin 4 (four) times a day   albuterol (PROVENTIL HFA) 90 mcg/act inhaler  Self No No   Sig: Inhale 2 puffs every 6 (six) hours as needed for wheezing For another 24 hours use every 4 hours standing   aspirin 81 MG tablet  Self Yes No   Sig: Take 1 tablet by mouth daily   atorvastatin (LIPITOR) 40 mg tablet   No No   Sig: Take 1 tablet (40 mg total) by mouth daily   budesonide-formoterol (SYMBICORT) 80-4 5 MCG/ACT inhaler  Self No No   Sig: Inhale 2 puffs 2 (two) times a day Rinse mouth after use  Patient taking differently: Inhale 2 puffs as needed Rinse mouth after use     chlorthalidone 25 mg tablet  Self No No   Sig: Take 1 tablet (25 mg total) by mouth daily   diltiazem (TIAZAC) 420 MG 24 hr capsule  Self No No   Sig: Take 1 capsule (420 mg total) by mouth daily   gabapentin (NEURONTIN) 100 mg capsule  Self No No   Sig: Take 1 capsule (100 mg total) by mouth 2 (two) times a day   insulin lispro (HUMALOG KWIKPEN) 100 units/mL injection pen   No No   Si units before meals or 40 if BG over 200   losartan (COZAAR) 100 MG tablet  Self No No   Sig: Take 1 tablet (100 mg total) by mouth daily   metFORMIN (GLUCOPHAGE) 500 mg tablet   No No   Sig: Take 2 tablets (1,000 mg total) by mouth 2 (two) times a day with meals for 90 days 2 tabs daily with supper      Facility-Administered Medications: None       Past Medical History:   Diagnosis Date    Anemia     Arthritis     Diabetes mellitus ([address] )     Dyspnea on exertion     Hyperlipidemia     Hypertension     Legally blind 2010    Mild intermittent asthma with exacerbation 8/4/2018    Miscarriage     x 3    Seizures (HCC)     [facility]     Sleep apnea        Past Surgical History:   Procedure Laterality Date    CATARACT EXTRACTION Bilateral     august and september    EYE SURGERY      HYSTERECTOMY      44    OOPHORECTOMY Left     44       Family History   Problem Relation Age of Onset    Heart attack Mother     Heart disease Mother     Hypertension Mother     No Known Problems Father     Heart disease Sister     No Known Problems Brother     No Known Problems Son     No Known Problems Daughter     Heart attack Maternal Grandmother     Heart disease Maternal Grandmother     Diabetes Other     Heart attack Other     No Known Problems Sister     No Known Problems Sister     No Known Problems Paternal Aunt     No Known Problems Son     Cancer Neg Hx     Stroke Neg Hx      I have reviewed and agree with the history as documented  Social History     Tobacco Use    Smoking status: Never Smoker    Smokeless tobacco: Never Used   Substance Use Topics    Alcohol use: No    Drug use: No        Review of Systems   Constitutional: Negative for chills and fever  HENT: Negative for sore throat  Eyes: Negative for photophobia and visual disturbance  Respiratory: Negative for shortness of breath  Cardiovascular: Negative for chest pain and palpitations  Gastrointestinal: Negative for abdominal pain, diarrhea, nausea and vomiting  Endocrine: Negative for cold intolerance and heat intolerance  Genitourinary: Negative for dysuria and frequency  Musculoskeletal: Negative for back pain  Skin: Negative for rash  Allergic/Immunologic: Negative for immunocompromised state  Neurological: Positive for weakness, numbness and headaches  Hematological: Negative for adenopathy     Psychiatric/Behavioral: Negative for self-injury  Physical Exam  Physical Exam   Constitutional: She is oriented to person, place, and time  She appears well-developed and well-nourished  No distress  HENT:   Head: Normocephalic and atraumatic  Eyes: Pupils are equal, round, and reactive to light  Conjunctivae and EOM are normal    Neck: Normal range of motion  Neck supple  Cardiovascular: Normal rate and regular rhythm  Pulmonary/Chest: Effort normal and breath sounds normal    Abdominal: Soft  She exhibits no distension  There is no tenderness  Musculoskeletal: Normal range of motion  She exhibits no edema  Neurological: She is alert and oriented to person, place, and time  She has normal strength and normal reflexes  No cranial nerve deficit or sensory deficit  Skin: Skin is warm and dry  Psychiatric: She has a normal mood and affect         Vital Signs  ED Triage Vitals [09/09/19 1711]   Temperature Pulse Respirations Blood Pressure SpO2   99 6 °F (37 6 °C) 94 17 161/85 98 %      Temp Source Heart Rate Source Patient Position - Orthostatic VS BP Location FiO2 (%)   Tympanic Monitor Lying Right arm --      Pain Score       Worst Possible Pain           Vitals:    09/09/19 1711   BP: 161/85   Pulse: 94   Patient Position - Orthostatic VS: Lying         Visual Acuity      ED Medications  Medications   sodium chloride 0 9 % bolus 1,000 mL (has no administration in time range)   diphenhydrAMINE (BENADRYL) injection 25 mg (has no administration in time range)       Diagnostic Studies  Results Reviewed     Procedure Component Value Units Date/Time    Protime-INR [[id number]]     Lab Status:  No result Specimen:  Blood     APTT [[id number]]     Lab Status:  No result Specimen:  Blood     Comprehensive metabolic panel [[id number]]     Lab Status:  No result Specimen:  Blood     Troponin I [[id number]]     Lab Status:  No result Specimen:  Blood     CBC and differential [[id number]]     Lab Status:  No result Specimen:  Blood CT head without contrast    (Results Pending)   XR chest 1 view portable    (Results Pending)              Procedures  Procedures       ED Course  ED Course as of Sep 09 1948   Mon Sep 09, 2019   1917 WBC(!): 14 00                               MDM  Number of Diagnoses or Management Options  Chronic nonintractable headache, unspecified headache type:   Diagnosis management comments: The patient is well appearing with a benign exam and stable vital signs  Unclear etiology of her bilateral arm "stiffness and numbness"  Headache is consistent with past migraines  Low clinical suspicion for CVA, MI  Will check EKG, CXR, basic labs, troponin, and a head CT  IVFs, Reglan, and Benadryl administered --> will continue to monitor in the ED     20:00 All symptoms resolved --> no current headache or upper extremity stiffness/numbness  The patient states she feels "much better" and is requesting discharge  Nonspecific findings on workup (mildly elevated WBC count, CO2)  Observation stay recommended but the patient declined  Plan for close PCP follow up tomorrow for reassessment and further workup  The patient is agreeable to this plan  She was also encouraged to keep her upcoming appointment with Neurology  Strict return precautions provided  Amount and/or Complexity of Data Reviewed  Clinical lab tests: ordered and reviewed  Tests in the radiology section of CPT®: ordered and reviewed    Risk of Complications, Morbidity, and/or Mortality  Presenting problems: moderate  Diagnostic procedures: moderate  Management options: moderate    Patient Progress  Patient progress: improved      Disposition  Final diagnoses:   None     ED Disposition     None      Follow-up Information    None         Patient's Medications   Discharge Prescriptions    No medications on file     No discharge procedures on file      ED Provider  Electronically Signed by           Michele Alcantar MD  09/09/19 [id number]

## 2019-09-10 NOTE — ED NOTES
Called [facility] service for patient and patient's family member       Angelina Dillard RN  09/09/19 0703

## 2019-09-11 LAB
ATRIAL RATE: 99 BPM
P AXIS: 46 DEGREES
PR INTERVAL: 168 MS
QRS AXIS: -1 DEGREES
QRSD INTERVAL: 94 MS
QT INTERVAL: 386 MS
QTC INTERVAL: 495 MS
T WAVE AXIS: 35 DEGREES
VENTRICULAR RATE: 99 BPM

## 2019-09-11 PROCEDURE — 93010 ELECTROCARDIOGRAM REPORT: CPT | Performed by: INTERNAL MEDICINE

## 2019-09-16 ENCOUNTER — TELEPHONE (OUTPATIENT)
Dept: INTERNAL MEDICINE CLINIC | Facility: CLINIC | Age: 59
End: 2019-09-16

## 2019-09-16 ENCOUNTER — APPOINTMENT (EMERGENCY)
Dept: CT IMAGING | Facility: HOSPITAL | Age: 59
DRG: 607 | End: 2019-09-16
Payer: COMMERCIAL

## 2019-09-16 ENCOUNTER — APPOINTMENT (EMERGENCY)
Dept: NON INVASIVE DIAGNOSTICS | Facility: HOSPITAL | Age: 59
DRG: 607 | End: 2019-09-16
Payer: COMMERCIAL

## 2019-09-16 ENCOUNTER — HOSPITAL ENCOUNTER (INPATIENT)
Facility: HOSPITAL | Age: 59
LOS: 7 days | Discharge: NURSING, REHAB OR LONG-TERM CARE | DRG: 607 | End: 2019-09-23
Attending: EMERGENCY MEDICINE | Admitting: FAMILY MEDICINE
Payer: COMMERCIAL

## 2019-09-16 ENCOUNTER — APPOINTMENT (EMERGENCY)
Dept: RADIOLOGY | Facility: HOSPITAL | Age: 59
DRG: 607 | End: 2019-09-16
Payer: COMMERCIAL

## 2019-09-16 DIAGNOSIS — R53.1 WEAKNESS: Primary | ICD-10-CM

## 2019-09-16 DIAGNOSIS — D72.829 LEUKOCYTOSIS: ICD-10-CM

## 2019-09-16 DIAGNOSIS — M79.89 PAIN AND SWELLING OF RIGHT UPPER EXTREMITY: ICD-10-CM

## 2019-09-16 DIAGNOSIS — R60.9 PERIPHERAL EDEMA: ICD-10-CM

## 2019-09-16 DIAGNOSIS — M79.601 PAIN AND SWELLING OF RIGHT UPPER EXTREMITY: ICD-10-CM

## 2019-09-16 PROBLEM — R74.01 TRANSAMINITIS: Status: ACTIVE | Noted: 2019-09-16

## 2019-09-16 LAB
ALBUMIN SERPL BCP-MCNC: 3.8 G/DL (ref 3–5.2)
ALP SERPL-CCNC: 325 U/L (ref 43–122)
ALT SERPL W P-5'-P-CCNC: 59 U/L (ref 9–52)
ANION GAP SERPL CALCULATED.3IONS-SCNC: 9 MMOL/L (ref 5–14)
ANISOCYTOSIS BLD QL SMEAR: PRESENT
AST SERPL W P-5'-P-CCNC: 64 U/L (ref 14–36)
BACTERIA UR QL AUTO: ABNORMAL /HPF
BASOPHILS # BLD AUTO: 0.2 THOUSANDS/ΜL (ref 0–0.1)
BASOPHILS NFR BLD AUTO: 2 % (ref 0–1)
BILIRUB SERPL-MCNC: 1.1 MG/DL
BILIRUB UR QL STRIP: NEGATIVE
BUN SERPL-MCNC: 26 MG/DL (ref 5–25)
CALCIUM SERPL-MCNC: 9 MG/DL (ref 8.4–10.2)
CHLORIDE SERPL-SCNC: 92 MMOL/L (ref 97–108)
CK SERPL-CCNC: 55 U/L (ref 30–135)
CLARITY UR: CLEAR
CO2 SERPL-SCNC: 34 MMOL/L (ref 22–30)
COLOR UR: ABNORMAL
CREAT SERPL-MCNC: 0.84 MG/DL (ref 0.6–1.2)
EOSINOPHIL # BLD AUTO: 0.2 THOUSAND/ΜL (ref 0–0.4)
EOSINOPHIL NFR BLD AUTO: 2 % (ref 0–6)
ERYTHROCYTE [DISTWIDTH] IN BLOOD BY AUTOMATED COUNT: 15.1 %
ERYTHROCYTE [SEDIMENTATION RATE] IN BLOOD: >130 MM/HOUR (ref 1–20)
FERRITIN SERPL-MCNC: 560 NG/ML (ref 8–388)
GFR SERPL CREATININE-BSD FRML MDRD: 87 ML/MIN/1.73SQ M
GLUCOSE SERPL-MCNC: 321 MG/DL (ref 65–140)
GLUCOSE SERPL-MCNC: 350 MG/DL (ref 70–99)
GLUCOSE SERPL-MCNC: 359 MG/DL (ref 65–140)
GLUCOSE UR STRIP-MCNC: ABNORMAL MG/DL
HCT VFR BLD AUTO: 29.2 % (ref 36–46)
HGB BLD-MCNC: 9.5 G/DL (ref 12–16)
HGB UR QL STRIP.AUTO: NEGATIVE
HYPERCHROMIA BLD QL SMEAR: PRESENT
IRON SATN MFR SERPL: 15 %
IRON SERPL-MCNC: 33 UG/DL (ref 50–170)
KETONES UR STRIP-MCNC: NEGATIVE MG/DL
LEUKOCYTE ESTERASE UR QL STRIP: NEGATIVE
LYMPHOCYTES # BLD AUTO: 1.8 THOUSANDS/ΜL (ref 0.5–4)
LYMPHOCYTES NFR BLD AUTO: 12 % (ref 25–45)
MCH RBC QN AUTO: 24.6 PG (ref 26–34)
MCHC RBC AUTO-ENTMCNC: 32.5 G/DL (ref 31–36)
MCV RBC AUTO: 75 FL (ref 80–100)
MICROCYTES BLD QL AUTO: PRESENT
MONOCYTES # BLD AUTO: 1.2 THOUSAND/ΜL (ref 0.2–0.9)
MONOCYTES NFR BLD AUTO: 8 % (ref 1–10)
NEUTROPHILS # BLD AUTO: 11.3 THOUSANDS/ΜL (ref 1.8–7.8)
NEUTS SEG NFR BLD AUTO: 77 % (ref 45–65)
NITRITE UR QL STRIP: NEGATIVE
NON-SQ EPI CELLS URNS QL MICRO: ABNORMAL /HPF
NT-PROBNP SERPL-MCNC: 136 PG/ML (ref 0–299)
PH UR STRIP.AUTO: 6 [PH]
PLATELET # BLD AUTO: 520 THOUSANDS/UL (ref 150–450)
PLATELET BLD QL SMEAR: ABNORMAL
PMV BLD AUTO: 8.7 FL (ref 8.9–12.7)
POTASSIUM SERPL-SCNC: 4.5 MMOL/L (ref 3.6–5)
PROT SERPL-MCNC: 8 G/DL (ref 5.9–8.4)
PROT UR STRIP-MCNC: ABNORMAL MG/DL
RBC # BLD AUTO: 3.87 MILLION/UL (ref 4–5.2)
RBC #/AREA URNS AUTO: ABNORMAL /HPF
RBC MORPH BLD: PRESENT
SODIUM SERPL-SCNC: 135 MMOL/L (ref 137–147)
SP GR UR STRIP.AUTO: 1.01 (ref 1–1.04)
TIBC SERPL-MCNC: 215 UG/DL (ref 250–450)
TROPONIN I SERPL-MCNC: <0.01 NG/ML (ref 0–0.03)
TSH SERPL DL<=0.05 MIU/L-ACNC: 1.71 UIU/ML (ref 0.47–4.68)
URATE SERPL-MCNC: 3.3 MG/DL (ref 2.7–7.5)
UROBILINOGEN UA: 1 MG/DL
WBC # BLD AUTO: 14.7 THOUSAND/UL (ref 4.5–11)
WBC #/AREA URNS AUTO: ABNORMAL /HPF

## 2019-09-16 PROCEDURE — 73130 X-RAY EXAM OF HAND: CPT

## 2019-09-16 PROCEDURE — 83880 ASSAY OF NATRIURETIC PEPTIDE: CPT | Performed by: EMERGENCY MEDICINE

## 2019-09-16 PROCEDURE — 85652 RBC SED RATE AUTOMATED: CPT | Performed by: FAMILY MEDICINE

## 2019-09-16 PROCEDURE — 82550 ASSAY OF CK (CPK): CPT | Performed by: FAMILY MEDICINE

## 2019-09-16 PROCEDURE — 80074 ACUTE HEPATITIS PANEL: CPT | Performed by: FAMILY MEDICINE

## 2019-09-16 PROCEDURE — 81001 URINALYSIS AUTO W/SCOPE: CPT | Performed by: EMERGENCY MEDICINE

## 2019-09-16 PROCEDURE — 82728 ASSAY OF FERRITIN: CPT | Performed by: FAMILY MEDICINE

## 2019-09-16 PROCEDURE — 85025 COMPLETE CBC W/AUTO DIFF WBC: CPT | Performed by: EMERGENCY MEDICINE

## 2019-09-16 PROCEDURE — 81003 URINALYSIS AUTO W/O SCOPE: CPT | Performed by: EMERGENCY MEDICINE

## 2019-09-16 PROCEDURE — 93005 ELECTROCARDIOGRAM TRACING: CPT

## 2019-09-16 PROCEDURE — 36415 COLL VENOUS BLD VENIPUNCTURE: CPT | Performed by: EMERGENCY MEDICINE

## 2019-09-16 PROCEDURE — 99223 1ST HOSP IP/OBS HIGH 75: CPT | Performed by: FAMILY MEDICINE

## 2019-09-16 PROCEDURE — 84443 ASSAY THYROID STIM HORMONE: CPT | Performed by: FAMILY MEDICINE

## 2019-09-16 PROCEDURE — 84484 ASSAY OF TROPONIN QUANT: CPT | Performed by: EMERGENCY MEDICINE

## 2019-09-16 PROCEDURE — 99285 EMERGENCY DEPT VISIT HI MDM: CPT

## 2019-09-16 PROCEDURE — 99284 EMERGENCY DEPT VISIT MOD MDM: CPT | Performed by: EMERGENCY MEDICINE

## 2019-09-16 PROCEDURE — 71045 X-RAY EXAM CHEST 1 VIEW: CPT

## 2019-09-16 PROCEDURE — 83540 ASSAY OF IRON: CPT | Performed by: FAMILY MEDICINE

## 2019-09-16 PROCEDURE — 87040 BLOOD CULTURE FOR BACTERIA: CPT | Performed by: EMERGENCY MEDICINE

## 2019-09-16 PROCEDURE — 73090 X-RAY EXAM OF FOREARM: CPT

## 2019-09-16 PROCEDURE — 93971 EXTREMITY STUDY: CPT

## 2019-09-16 PROCEDURE — 73206 CT ANGIO UPR EXTRM W/O&W/DYE: CPT

## 2019-09-16 PROCEDURE — 84550 ASSAY OF BLOOD/URIC ACID: CPT | Performed by: FAMILY MEDICINE

## 2019-09-16 PROCEDURE — 80053 COMPREHEN METABOLIC PANEL: CPT | Performed by: EMERGENCY MEDICINE

## 2019-09-16 PROCEDURE — 82948 REAGENT STRIP/BLOOD GLUCOSE: CPT

## 2019-09-16 PROCEDURE — 93971 EXTREMITY STUDY: CPT | Performed by: SURGERY

## 2019-09-16 PROCEDURE — 83550 IRON BINDING TEST: CPT | Performed by: FAMILY MEDICINE

## 2019-09-16 RX ORDER — CHLORTHALIDONE 25 MG/1
25 TABLET ORAL DAILY
Status: DISCONTINUED | OUTPATIENT
Start: 2019-09-17 | End: 2019-09-16

## 2019-09-16 RX ORDER — ASPIRIN 81 MG/1
81 TABLET, CHEWABLE ORAL DAILY
Status: DISCONTINUED | OUTPATIENT
Start: 2019-09-17 | End: 2019-09-23 | Stop reason: HOSPADM

## 2019-09-16 RX ORDER — BUDESONIDE AND FORMOTEROL FUMARATE DIHYDRATE 80; 4.5 UG/1; UG/1
2 AEROSOL RESPIRATORY (INHALATION) 2 TIMES DAILY
Status: DISCONTINUED | OUTPATIENT
Start: 2019-09-16 | End: 2019-09-23 | Stop reason: HOSPADM

## 2019-09-16 RX ORDER — GABAPENTIN 100 MG/1
100 CAPSULE ORAL 2 TIMES DAILY
Status: DISCONTINUED | OUTPATIENT
Start: 2019-09-16 | End: 2019-09-23 | Stop reason: HOSPADM

## 2019-09-16 RX ORDER — OXYCODONE HYDROCHLORIDE AND ACETAMINOPHEN 5; 325 MG/1; MG/1
1 TABLET ORAL EVERY 4 HOURS PRN
Status: DISCONTINUED | OUTPATIENT
Start: 2019-09-16 | End: 2019-09-16

## 2019-09-16 RX ORDER — ACETAMINOPHEN 325 MG/1
650 TABLET ORAL EVERY 6 HOURS PRN
Status: DISCONTINUED | OUTPATIENT
Start: 2019-09-16 | End: 2019-09-23 | Stop reason: HOSPADM

## 2019-09-16 RX ORDER — SODIUM CHLORIDE 9 MG/ML
125 INJECTION, SOLUTION INTRAVENOUS CONTINUOUS
Status: DISCONTINUED | OUTPATIENT
Start: 2019-09-16 | End: 2019-09-20

## 2019-09-16 RX ORDER — ACETAMINOPHEN 325 MG/1
975 TABLET ORAL ONCE
Status: COMPLETED | OUTPATIENT
Start: 2019-09-16 | End: 2019-09-16

## 2019-09-16 RX ORDER — OXYCODONE HYDROCHLORIDE 5 MG/1
5 TABLET ORAL EVERY 6 HOURS PRN
Status: DISCONTINUED | OUTPATIENT
Start: 2019-09-16 | End: 2019-09-23 | Stop reason: HOSPADM

## 2019-09-16 RX ORDER — DILTIAZEM HYDROCHLORIDE 420 MG/1
420 CAPSULE, EXTENDED RELEASE ORAL DAILY
Status: DISCONTINUED | OUTPATIENT
Start: 2019-09-17 | End: 2019-09-16 | Stop reason: SDUPTHER

## 2019-09-16 RX ORDER — ATORVASTATIN CALCIUM 40 MG/1
40 TABLET, FILM COATED ORAL DAILY
Status: DISCONTINUED | OUTPATIENT
Start: 2019-09-17 | End: 2019-09-23 | Stop reason: HOSPADM

## 2019-09-16 RX ORDER — INSULIN GLARGINE 100 [IU]/ML
40 INJECTION, SOLUTION SUBCUTANEOUS
Status: DISCONTINUED | OUTPATIENT
Start: 2019-09-16 | End: 2019-09-18

## 2019-09-16 RX ORDER — LOSARTAN POTASSIUM 50 MG/1
100 TABLET ORAL DAILY
Status: DISCONTINUED | OUTPATIENT
Start: 2019-09-17 | End: 2019-09-23 | Stop reason: HOSPADM

## 2019-09-16 RX ORDER — MORPHINE SULFATE 4 MG/ML
4 INJECTION, SOLUTION INTRAMUSCULAR; INTRAVENOUS EVERY 4 HOURS PRN
Status: DISCONTINUED | OUTPATIENT
Start: 2019-09-16 | End: 2019-09-23 | Stop reason: HOSPADM

## 2019-09-16 RX ORDER — ALBUTEROL SULFATE 90 UG/1
2 AEROSOL, METERED RESPIRATORY (INHALATION) EVERY 6 HOURS PRN
Status: DISCONTINUED | OUTPATIENT
Start: 2019-09-16 | End: 2019-09-23 | Stop reason: HOSPADM

## 2019-09-16 RX ADMIN — BUDESONIDE AND FORMOTEROL FUMARATE DIHYDRATE 2 PUFF: 80; 4.5 AEROSOL RESPIRATORY (INHALATION) at 18:30

## 2019-09-16 RX ADMIN — INSULIN LISPRO 10 UNITS: 100 INJECTION, SOLUTION INTRAVENOUS; SUBCUTANEOUS at 17:42

## 2019-09-16 RX ADMIN — INSULIN GLARGINE 40 UNITS: 100 INJECTION, SOLUTION SUBCUTANEOUS at 23:21

## 2019-09-16 RX ADMIN — SODIUM CHLORIDE 125 ML/HR: 0.9 INJECTION, SOLUTION INTRAVENOUS at 20:00

## 2019-09-16 RX ADMIN — INSULIN LISPRO 3 UNITS: 100 INJECTION, SOLUTION INTRAVENOUS; SUBCUTANEOUS at 23:20

## 2019-09-16 RX ADMIN — IOHEXOL 100 ML: 350 INJECTION, SOLUTION INTRAVENOUS at 15:20

## 2019-09-16 RX ADMIN — MORPHINE SULFATE 4 MG: 4 INJECTION INTRAVENOUS at 20:00

## 2019-09-16 RX ADMIN — GABAPENTIN 100 MG: 100 CAPSULE ORAL at 18:31

## 2019-09-16 RX ADMIN — ACETAMINOPHEN 975 MG: 325 TABLET ORAL at 10:31

## 2019-09-16 NOTE — ASSESSMENT & PLAN NOTE
· Alk phos >300, mild ast/alt elevation - but increased from 9/09- no abdominal pain - check us of gallbladder anyway

## 2019-09-16 NOTE — ASSESSMENT & PLAN NOTE
· Chronic microcytic but keeps dropping from previous - check iron panel and ferritin , fecal occult- no report of bleeding

## 2019-09-16 NOTE — ED PROVIDER NOTES
History  Chief Complaint   Patient presents with    Hand Swelling     Right hand swelling for days and weakness in arms, no known trauma  Patient reports seen recently for same and was told to see neuro  Has appt with Neuro on friday   Extremity Weakness     bilateral for days   and son not helping her take care of herself or helping with her meds  59year old female who presents to ED for concerns or RUE pain  She has chronic pain and intermittent paresthesias in this extremity  States she nwo has swelling as well  She notes she was here 1 week ago for migraines, had an extensive work up and at that time elected to go home rather than be observed overnight in the hospital  She states due to the pain she is having difficulty taking care of herself at home  She notes that she does have a with her  however he is an amputee is having difficulty taking for her himself and her  She states she is not able to get her medications  She states she has a mild headache that is consistent with her previous headaches, denies any associated nausea vomiting diarrhea denies any changes in her vision  Denies any chest pain or shortness of breath  She states that her right arm is painful with moving, she feels swollen from her forearm down her finger tip states she does not feel she having swelling in her lower extremities  Prior to Admission Medications   Prescriptions Last Dose Informant Patient Reported? Taking? BASAGLAR KWIKPEN 100 units/mL injection pen   No No   Si units twice per day   SARAH MICROLET LANCETS lancets  Self Yes No   Sig: Inject 1 applicator into the skin 4 (four) times a day   Blood Glucose Monitoring Suppl (CONTOUR NEXT MONITOR) w/Device KIT   No No   Sig: Disp 1 meter dx E11 9, may submitted any contour next meter   If not covered let us know we can change strips   Blood Pressure Monitor KIT  Self No No   Sig: Check blood pressures BID   CONTOUR NEXT TEST test strip No No   Sig: Test blood sugar 3x per day dx E11 9   Cholecalciferol (VITAMIN D3) 3000 units TABS  Self Yes No   Sig: Take by mouth daily    Insulin Pen Needle (BD PEN NEEDLE DERRICK U/F) 32G X 4 MM MISC  Self Yes No   Sig: Inject 1 applicator under the skin 4 (four) times a day   albuterol (PROVENTIL HFA) 90 mcg/act inhaler  Self No No   Sig: Inhale 2 puffs every 6 (six) hours as needed for wheezing For another 24 hours use every 4 hours standing   aspirin 81 MG tablet  Self Yes No   Sig: Take 1 tablet by mouth daily   atorvastatin (LIPITOR) 40 mg tablet   No No   Sig: Take 1 tablet (40 mg total) by mouth daily   budesonide-formoterol (SYMBICORT) 80-4 5 MCG/ACT inhaler  Self No No   Sig: Inhale 2 puffs 2 (two) times a day Rinse mouth after use  Patient taking differently: Inhale 2 puffs as needed Rinse mouth after use     chlorthalidone 25 mg tablet  Self No No   Sig: Take 1 tablet (25 mg total) by mouth daily   diltiazem (TIAZAC) 420 MG 24 hr capsule  Self No No   Sig: Take 1 capsule (420 mg total) by mouth daily   gabapentin (NEURONTIN) 100 mg capsule  Self No No   Sig: Take 1 capsule (100 mg total) by mouth 2 (two) times a day   insulin lispro (HUMALOG KWIKPEN) 100 units/mL injection pen   No No   Si units before meals or 40 if BG over 200   losartan (COZAAR) 100 MG tablet  Self No No   Sig: Take 1 tablet (100 mg total) by mouth daily   metFORMIN (GLUCOPHAGE) 500 mg tablet   No No   Sig: Take 2 tablets (1,000 mg total) by mouth 2 (two) times a day with meals for 90 days 2 tabs daily with supper      Facility-Administered Medications: None       Past Medical History:   Diagnosis Date    Anemia     Arthritis     Diabetes mellitus (HCC)     Dyspnea on exertion     Hyperlipidemia     Hypertension     Legally blind     Mild intermittent asthma with exacerbation 2018    Miscarriage     x 3    Seizures (HCC)     Sickle cell trait ([facility] [address] )     Sleep apnea        Past Surgical History:   Procedure Laterality Date    CATARACT EXTRACTION Bilateral     august and september    EYE SURGERY      HYSTERECTOMY      42    OOPHORECTOMY Left     42       Family History   Problem Relation Age of Onset    Heart attack Mother     Heart disease Mother     Hypertension Mother     No Known Problems Father     Heart disease Sister     No Known Problems Brother     No Known Problems Son     No Known Problems Daughter     Heart attack Maternal Grandmother     Heart disease Maternal Grandmother     Diabetes Other     Heart attack Other     No Known Problems Sister     No Known Problems Sister     No Known Problems Paternal Aunt     No Known Problems Son     Cancer Neg Hx     Stroke Neg Hx      I have reviewed and agree with the history as documented  Social History     Tobacco Use    Smoking status: Never Smoker    Smokeless tobacco: Never Used   Substance Use Topics    Alcohol use: No    Drug use: No        Review of Systems   Constitutional: Negative  Negative for chills and fever  HENT: Negative  Negative for rhinorrhea, sore throat, trouble swallowing and voice change  Eyes: Negative  Negative for pain and visual disturbance  Respiratory: Negative  Negative for cough, shortness of breath and wheezing  Cardiovascular: Negative  Negative for chest pain and palpitations  Gastrointestinal: Negative for abdominal pain, diarrhea, nausea and vomiting  Genitourinary: Negative  Negative for dysuria and frequency  Musculoskeletal: Positive for arthralgias, joint swelling and myalgias  Negative for neck pain and neck stiffness  Skin: Negative  Negative for rash  Neurological: Negative  Negative for dizziness, speech difficulty, weakness, light-headedness and numbness  Physical Exam  Physical Exam   Constitutional: She is oriented to person, place, and time  She appears well-developed and well-nourished  No distress  HENT:   Head: Normocephalic and atraumatic  Mouth/Throat: Oropharynx is clear and moist    Eyes: Pupils are equal, round, and reactive to light  Conjunctivae and EOM are normal    Neck: Normal range of motion  Neck supple  No tracheal deviation present  Cardiovascular: Normal rate, regular rhythm and intact distal pulses  Pulmonary/Chest: Effort normal and breath sounds normal  No respiratory distress  She has no wheezes  She has no rales  Abdominal: Soft  Bowel sounds are normal  She exhibits no distension  There is no tenderness  There is no rebound and no guarding  Musculoskeletal: Normal range of motion  She exhibits no tenderness or deformity  Arms:  Patient asked to raise right upper extremity, states she is unable to do so, uses her left hand to hold it at shoulder height, can resist gravity once holding her right upper extremity up  Neurological: She is alert and oriented to person, place, and time  Skin: Skin is warm and dry  Capillary refill takes less than 2 seconds  No rash noted  Psychiatric: She has a normal mood and affect  Her behavior is normal    Nursing note and vitals reviewed        Vital Signs  ED Triage Vitals [09/16/19 0936]   Temperature Pulse Respirations Blood Pressure SpO2   100 3 °F (37 9 °C) 100 20 (!) 172/84 98 %      Temp src Heart Rate Source Patient Position - Orthostatic VS BP Location FiO2 (%)   -- Monitor Lying Left arm --      Pain Score       Worst Possible Pain           Vitals:    09/16/19 0936   BP: (!) 172/84   Pulse: 100   Patient Position - Orthostatic VS: Lying         Visual Acuity      ED Medications  Medications   acetaminophen (TYLENOL) tablet 975 mg (has no administration in time range)       Diagnostic Studies  Results Reviewed     Procedure Component Value Units Date/Time    UA w Reflex to Microscopic w Reflex to Culture [[id number]]     Lab Status:  No result Specimen:  Urine     Blood culture #2 [[id number]]     Lab Status:  No result Specimen:  Blood     CBC and differential [[id number]] Collected:  09/16/19 0945    Lab Status:  No result Specimen:  Blood from Arm, Left     Comprehensive metabolic panel [[id number]] Collected:  09/16/19 0945    Lab Status:  No result Specimen:  Blood from Arm, Left     Blood culture #1 [[id number]] Collected:  09/16/19 0945    Lab Status:  No result Specimen:  Blood from Arm, Left     B-type natriuretic peptide [[id number]] Collected:  09/16/19 0945    Lab Status:  No result Specimen:  Blood from Arm, Left     Troponin I [[id number]] Collected:  09/16/19 0945    Lab Status:  No result Specimen:  Blood from Arm, Left                  XR hand 3+ views RIGHT    (Results Pending)   XR forearm 2 views RIGHT    (Results Pending)   VAS upper limb venous duplex scan, unilateral/limited    (Results Pending)   XR chest 1 view portable    (Results Pending)              Procedures  Procedures       ED Course  ED Course as of Sep 16 1746   Mon Sep 16, 2019   1040 Procedure Note: EKG  Date/Time: 09/16/19 10:40 AM   Performed by: Dana Rodriguez  Authorized by: Dana Rodriguez  ECG interpreted by me, the ED Provider: yes   The EKG demonstrates:  Rate 98  Rhythm Sinus Rhythm  QTc 505  No ST elevations/depressions                                      MDM  Number of Diagnoses or Management Options  Leukocytosis:   Peripheral edema:   Weakness:   Diagnosis management comments: Patient is a 78-year-old female who presents for right upper extremity weakness  No true focal neurologic deficit can be elicited  She does appear to have significant discomfort when trying to raise for extremity  There is swelling and warmth  Will obtain blood cultures patient has a temperature of 100 3°  Heart rate of 100  She did have a leukocytosis on her previous labs  Will repeat blood work, cultures, EKG chest x-ray and obtain imaging of the right upper extremity to help rule out fracture or DVT    After having lengthy discussion with the patient, she has a feeling she is safe to go home with, EMS who dropped her off states that the house is disheveled and they were concerned for the patient's safety as well  Patient states she is  agreeable to be admitted the hospital for further observation and care  Disposition will be pending results of her imaging and lab work  Patient has persistent leukocytosis  Pain is improved with Tylenol  She is requesting to eat  Her other lab work was grossly unremarkable  No DVT pre she did the right upper extremity  Arterial blood flow was okay per the ultrasound tech  CTA of the right upper extremity shows good arterial blood flow to extremities, no narrowing  There is edema of lower evidence of cellulitis or abscess  Patient is to be admitted to the service of Dr Eileen Molina  She has requested the patient be temporarily held in the emergency room pending evaluation by vascular surgery  Amount and/or Complexity of Data Reviewed  Clinical lab tests: ordered and reviewed  Tests in the radiology section of CPT®: ordered and reviewed  Tests in the medicine section of CPT®: ordered and reviewed  Independent visualization of images, tracings, or specimens: yes        Disposition  Final diagnoses:   None     ED Disposition     None      Follow-up Information    None         Patient's Medications   Discharge Prescriptions    No medications on file     No discharge procedures on file      ED Provider  Electronically Signed by           Lesli Alvarez DO  09/16/19 7279

## 2019-09-16 NOTE — H&P
H&P- Mary Caul 1960, 59 y o  female MRN: [id number]    Unit/Bed#: ED 08 Encounter: [id number]    Primary Care Provider: Anne Hamilton MD   Date and time admitted to hospital: 9/16/2019  9:32 AM        Mild intermittent asthma with exacerbation  Assessment & Plan  · Resume home medications of symbicort       Transaminitis  Assessment & Plan  · Alk phos >300, mild ast/alt elevation - but increased from 9/09- no abdominal pain - check us of gallbladder anyway     Frequent headaches  Assessment & Plan  · Sounds like she has migraines not now - had ct done on 9/09 neg     Prolonged QT interval  Assessment & Plan  · qtc 505- repeat in am avoid qt prolonging meds-     Hyperlipidemia  Assessment & Plan  · Resume statin     Anemia  Assessment & Plan  · Chronic microcytic but keeps dropping from previous - check iron panel and ferritin , fecal occult- no report of bleeding    Hypertension  Assessment & Plan  · Stable resume all home meds except chlorthalidone as slight elevation of bun will start iv fluids     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, with long-term current use of insulin (HCC)  Assessment & Plan  Lab Results   Component Value Date    HGBA1C 14 6 (H) 08/22/2019       No results for input(s): POCGLU in the last 72 hours  Blood Sugar Average: Last 72 hrs:  · will be npo for now - will place lantus and iss and humalog when starts eating     * Pain and swelling of right upper extremity  Assessment & Plan  · Progressively getting worse for past week- with weakness- she reports weakness in b/l upper extremities but right worse then left   Unknown etiology at this time     · She has wbc and thrombocytosis   · But cta of upper extremity is negative   · vde is neg  · No evidence of cellulitis   · My concern also is for compartment syndrome - discussed with vascular dr acosta coming in to see patient   · Will keep npo for now till vascular evaluates   · Oxycodone for pain · Check tsh   · Esr   · Ck   · Uric acid   · Bun normal     VTE Prophylaxis: Enoxaparin (Lovenox)  / sequential compression device   Code Status: full code   POLST: There is no POLST form on file for this patient (pre-hospital)  Discussion with family: patient and friend     Anticipated Length of Stay:  Patient will be admitted on an Inpatient basis with an anticipated length of stay of  > 2 midnights  Justification for Hospital Stay:  Right upper extremity swelling and bilateral upper extremity weakness    Total Time for Visit, including Counseling / Coordination of Care: 1 hour  Greater than 50% of this total time spent on direct patient counseling and coordination of care  Chief Complaint:   Right upper extremity swelling pain and weakness    History of Present Illness:    Mary Magana is a 59 y o  female who presents with right upper extremity pain swelling and weakness actually is bilateral upper extremities but the right is greater than left she presented back in September 19 as she also had associated migraine evaluated and sent home added been progressively getting worse the right upper extremity that she can't even open bottles  She has been not taking her medications secondary to that she denies any chest pain or shortness of breath she had a migraine headache associated photophobia with nausea over the weekend she has chronic blurry vision there is no report of speech discrepancy there is no double vision  She has been reporting lightheaded and dizzy also in terms of lightheadedness associated with vertigo the same time imBalance over few days  No nausea vomiting diarrhea abdominal pain  There was no trauma reported  She is unable to lift the right arm at all  Review of Systems:    Review of Systems   Constitutional: Negative for fatigue and fever  HENT: Negative  Negative for ear pain, rhinorrhea and sore throat  Eyes: Negative  Negative for pain and itching     Respiratory: Negative  Negative for cough, chest tightness, shortness of breath and wheezing  Cardiovascular: Negative  Negative for chest pain, palpitations and leg swelling  Gastrointestinal: Negative  Negative for abdominal pain, diarrhea, nausea and vomiting  Endocrine: Negative for polydipsia, polyphagia and polyuria  Genitourinary: Negative for dysuria and flank pain  Musculoskeletal: Negative  Negative for back pain and myalgias  Right upper extremity weakness pain swelling   Skin: Negative  Negative for rash and wound  Neurological: Positive for dizziness  Negative for syncope, speech difficulty, weakness, light-headedness, numbness and headaches  Psychiatric/Behavioral: Negative for agitation, confusion and decreased concentration  All other systems reviewed and are negative  Past Medical and Surgical History:     Past Medical History:   Diagnosis Date    Anemia     Arthritis     [facility] mellitus ([facility] )     Dyspnea on exertion     Hyperlipidemia     Hypertension     Legally blind 2010    Mild intermittent asthma with exacerbation 8/4/2018    Miscarriage     x 3    Seizures (HCC)     Sickle cell trait ([facility] )     Sleep apnea        Past Surgical History:   Procedure Laterality Date    CATARACT EXTRACTION Bilateral     august and september    EYE SURGERY      HYSTERECTOMY      37    OOPHORECTOMY Left     37       Meds/Allergies:    Prior to Admission medications    Medication Sig Start Date End Date Taking?  Authorizing Provider   albuterol (PROVENTIL HFA) 90 mcg/act inhaler Inhale 2 puffs every 6 (six) hours as needed for wheezing For another 24 hours use every 4 hours standing 8/6/18   Mauro Duran MD   aspirin 81 MG tablet Take 1 tablet by mouth daily 12/11/17   Historical Provider, MD   atorvastatin (LIPITOR) 40 mg tablet Take 1 tablet (40 mg total) by mouth daily 7/9/19   MELVA Gee 100 units/mL injection pen 40 units twice per day 8/22/19   Eden Jacinto PA-C   SARAH MICROLET LANCETS lancets Inject 1 applicator into the skin 4 (four) times a day 7/27/11   Historical Provider, MD   Blood Glucose Monitoring Suppl (CONTOUR NEXT MONITOR) w/Device KIT Disp 1 meter dx E11 9, may submitted any contour next meter  If not covered let us know we can change strips 7/8/19   Sky Chou PA-C   Blood Pressure Monitor KIT Check blood pressures BID 6/26/18   Braxton Vazquez DO   budesonide-formoterol (SYMBICORT) 80-4 5 MCG/ACT inhaler Inhale 2 puffs 2 (two) times a day Rinse mouth after use  Patient taking differently: Inhale 2 puffs as needed Rinse mouth after use  10/26/18   Marie Hodgkins, MD   chlorthalidone 25 mg tablet Take 1 tablet (25 mg total) by mouth daily 3/14/19   Montserrat Dudley MD   Cholecalciferol (VITAMIN D3) 3000 units TABS Take by mouth daily     Historical Provider, MD   CONTOUR NEXT TEST test strip Test blood sugar 3x per day dx E11 9 7/8/19   Eden Jacinto PA-C   diltiazem (TIAZAC) 420 MG 24 hr capsule Take 1 capsule (420 mg total) by mouth daily 6/20/19   Montserrat Dudley MD   gabapentin (NEURONTIN) 100 mg capsule Take 1 capsule (100 mg total) by mouth 2 (two) times a day 1/14/19   Arsen Hannah MD   insulin lispro (HUMALOG KWIKPEN) 100 units/mL injection pen 35 units before meals or 40 if BG over 200 8/22/19   Eden Jacinto PA-C   Insulin Pen Needle (BD PEN NEEDLE DERRICK U/F) 32G X 4 MM MISC Inject 1 applicator under the skin 4 (four) times a day 11/2/16   Historical Provider, MD   losartan (COZAAR) 100 MG tablet Take 1 tablet (100 mg total) by mouth daily 3/14/19   Montserrat Dudley MD   metFORMIN (GLUCOPHAGE) 500 mg tablet Take 2 tablets (1,000 mg total) by mouth 2 (two) times a day with meals for 90 days 2 tabs daily with supper 8/22/19 11/20/19  Sky Nigh, PA-C     I have reviewed home medications with a medical source (PCP, Pharmacy, other)      Allergies: No Known Allergies    Social History:     Marital Status: /Civil Union     Substance Use History:   Social History     Substance and Sexual Activity   Alcohol Use No     Social History     Tobacco Use   Smoking Status Never Smoker   Smokeless Tobacco Never Used     Social History     Substance and Sexual Activity   Drug Use No       Family History:    Family History   Problem Relation Age of Onset    Heart attack Mother     Heart disease Mother     Hypertension Mother     No Known Problems Father     Heart disease Sister     No Known Problems Brother     No Known Problems Son     No Known Problems Daughter     Heart attack Maternal Grandmother     Heart disease Maternal Grandmother     Diabetes Other     Heart attack Other     No Known Problems Sister     No Known Problems Sister     No Known Problems Paternal Aunt     No Known Problems Son     Cancer Neg Hx     Stroke Neg Hx        Physical Exam:     Vitals:   Blood Pressure: 148/83 (09/16/19 1623)  Pulse: 84 (09/16/19 1623)  Temperature: 98 1 °F (36 7 °C) (09/16/19 1405)  Temp Source: Oral (09/16/19 1405)  Respirations: 16 (09/16/19 1623)  SpO2: 97 % (09/16/19 1623)    Physical Exam   Constitutional: She is oriented to person, place, and time  She appears well-developed and well-nourished  HENT:   Head: Normocephalic and atraumatic  Eyes: Pupils are equal, round, and reactive to light  EOM are normal    Neck: Normal range of motion  Cardiovascular: Normal rate, regular rhythm and normal heart sounds  Pulmonary/Chest: Effort normal and breath sounds normal    Abdominal: Soft  Bowel sounds are normal    Musculoskeletal: Normal range of motion  She exhibits edema (Right upper extremity the most swelling is mid forearm started on starting going to the fingers there is also tenderness on the flexion side of the wrist and swelling and tight the radial pulse felt but diminished  )  Neurological: She is alert and oriented to person, place, and time  She has normal reflexes     cranial nerve 2-12 are normal   Normal neurological exam- except secondary to right upper extremity will swelling and pain that is why she is unable to do finger-to-nose or keep it up in the strength is decreased  Skin: Skin is warm  Psychiatric: She has a normal mood and affect  Additional Data:     Lab Results: I have personally reviewed pertinent films in PACS    Results from last 7 days   Lab Units 09/16/19  0945   WBC Thousand/uL 14 70*   HEMOGLOBIN g/dL 9 5*   HEMATOCRIT % 29 2*   PLATELETS Thousands/uL 520*   NEUTROS PCT % 77*   LYMPHS PCT % 12*   MONOS PCT % 8   EOS PCT % 2     Results from last 7 days   Lab Units 09/16/19  0945   SODIUM mmol/L 135*   POTASSIUM mmol/L 4 5   CHLORIDE mmol/L 92*   CO2 mmol/L 34*   BUN mg/dL 26*   CREATININE mg/dL 0 84   ANION GAP mmol/L 9   CALCIUM mg/dL 9 0   ALBUMIN g/dL 3 8   TOTAL BILIRUBIN mg/dL 1 10   ALK PHOS U/L 325*   ALT U/L 59*   AST U/L 64*   GLUCOSE RANDOM mg/dL 350*     Results from last 7 days   Lab Units 09/09/19  1816   INR  0 91                   Imaging: I have personally reviewed pertinent films in PACS    CTA upper extremity right w wo contrast   Final Result by German Taylor MD (09/16 1652)      Normal CTA of the right upper extremity      Enlarged left submandibular lymph node               Workstation performed: [id number]         XR hand 3+ views RIGHT   ED Interpretation by Toribio Mcneil DO (09/16 1146)   No acute fx or dislocation      Final Result by Jeanette Tripp MD (09/16 1219)      No acute osseous abnormality  Dorsal soft tissue swelling  Degenerative changes as described  Workstation performed: [id number]         XR forearm 2 views RIGHT   ED Interpretation by Toribio Mcneil DO (09/16 1147)   No acute fx or dislocation      Final Result by Jeanette Tripp MD (09/16 1222)      No acute osseous abnormality              Workstation performed: [id number]         XR chest 1 view   ED Interpretation by Syemour VAZQUEZ DO Yan (09/16 3119)   No pna or ptx appreciated  Final Result by Leland Bejarano MD (09/16 7070)      No acute cardiopulmonary disease  Workstation performed: [id number]         VAS upper limb venous duplex scan, unilateral/limited    (Results Pending)   US gallbladder    (Results Pending)       EKG, Pathology, and Other Studies Reviewed on Admission:   · EKG:  Reviewed    Allscripts / Epic Records Reviewed: Yes     ** Please Note: This note has been constructed using a voice recognition system   **

## 2019-09-16 NOTE — ASSESSMENT & PLAN NOTE
Lab Results   Component Value Date    HGBA1C 14 6 (H) 08/22/2019       No results for input(s): POCGLU in the last 72 hours      Blood Sugar Average: Last 72 hrs:  · will be npo for now - will place lantus and iss and humalog when starts eating

## 2019-09-16 NOTE — CONSULTS
Consultation - Vascular Surgery   Alexsander Isabel 59 y o  female MRN: [id number]  Unit/Bed#: ED 08 Encounter: [id number]      Assessment/Plan      Assessment:  Right forearm,  wrist and hand pain and swelling  Rule out compartment syndrome  Plan:  Rest,  Ice,  compression and elevation  Consult hand surgery if no improvement  History of Present Illness   Physician Requesting Consult: Scott Hahn DO  Reason for Consult / Principal Problem:  Right wrist and hand swelling  Rule out compartment syndrome  History, ROS and PFSH unobtainable from any source due to none  HPI: Alexsander Isabel is a 59y o  year old female who presents with bilateral arm pain and swelling for the past week  Left arm has improved spontaneously  The right arm wrist and hand were slightly better but are still painful and swollen  Denies trauma  Consults    Review of Systems   Constitutional: Negative  Respiratory: Negative for shortness of breath  Cardiovascular: Negative for chest pain  Gastrointestinal: Negative for abdominal pain  Musculoskeletal:        Positive for right forearm wrist and hand swelling with pain  Skin: Negative  Neurological: Positive for dizziness and light-headedness  Psychiatric/Behavioral: Negative          Historical Information   Past Medical History:   Diagnosis Date    Anemia     Arthritis     Diabetes mellitus ([facility] )     Dyspnea on exertion     Hyperlipidemia     Hypertension     Legally blind 2010    Mild intermittent asthma with exacerbation 8/4/2018    Miscarriage     x 3    Seizures (HCC)     Sickle cell trait ([facility] )     Sleep apnea      Past Surgical History:   Procedure Laterality Date    CATARACT EXTRACTION Bilateral     august and september    EYE SURGERY      HYSTERECTOMY      44    OOPHORECTOMY Left     44     Social History   Social History     Substance and Sexual Activity   Alcohol Use No     Social History     Substance and Sexual Activity   Drug Use No Social History     Tobacco Use   Smoking Status Never Smoker   Smokeless Tobacco Never Used     Family History: non-contributory}    Meds/Allergies   all current active meds have been reviewed  No Known Allergies    Objective   Vitals: Blood pressure 148/83, pulse 84, temperature 98 1 °F (36 7 °C), temperature source Oral, resp  rate 16, SpO2 97 %, not currently breastfeeding  ,There is no height or weight on file to calculate BMI  Intake/Output Summary (Last 24 hours) at 9/16/2019 1801  Last data filed at 9/16/2019 1156  Gross per 24 hour   Intake --   Output 500 ml   Net -500 ml     Invasive Devices     Peripheral Intravenous Line            Peripheral IV 09/16/19 Left Antecubital less than 1 day                Physical Exam   Constitutional: She appears well-developed and well-nourished  No distress  Neck: No JVD present  Cardiovascular: Normal heart sounds  Pulmonary/Chest: Breath sounds normal    Abdominal: Soft  There is no tenderness  Musculoskeletal:   Decreased range of motion of all fingers of the right hand    Positive swelling of right distal forearm,  wrist and hand  Neurological:   Central motor systems are intact  The patient has good sensation of all fingers of the right hand  However range of motion of all fingers of the right hand is diminished  No cyanosis of right hand and fingers is present  Pulses:    Axillary     Brachial      Radial      Ulnar  Right             +              +                Damp       Damp  Left                +              +                  +                +      Doppler signals:   Axillary        Brachial            Radial           Ulnar  Right                     +                   +                    +                   +  Left                        +                   +                    +                   +  Lab Results: I have personally reviewed pertinent reports  Imaging Studies: I have personally reviewed pertinent reports  EKG, Pathology, and Other Studies: I have personally reviewed pertinent reports  VTE Prophylaxis: Enoxaparin (Lovenox)     Code Status: Level 1 - Full Code  Advance Directive and Living Will:      Power of :    POLST:      Counseling / Coordination of Care  Counseling/Coordination of Care: Total floor / unit time spent today Twenty minutes  Greater than 50% of total time was spent with the patient and / or family counseling and / or coordination of care  A description of the counseling / coordination of care: Rest, ice, compression, elevation  No need for fasciotomy at this time

## 2019-09-16 NOTE — TELEPHONE ENCOUNTER
Complaints of pain, shaking, dizziness, shortness of breath, unable to take insulin and other medications for a couple of days due to hand pain, just not feeling well and didn't feel she could come in for office visit today  Patient was crying, stated she cannot take care of herself and would like to go to ER put couldn't call 911 for herself, therefore Ginny (MA) called for her

## 2019-09-17 ENCOUNTER — APPOINTMENT (INPATIENT)
Dept: ULTRASOUND IMAGING | Facility: HOSPITAL | Age: 59
DRG: 607 | End: 2019-09-17
Payer: COMMERCIAL

## 2019-09-17 LAB
ALBUMIN SERPL BCP-MCNC: 3.5 G/DL (ref 3–5.2)
ALP SERPL-CCNC: 291 U/L (ref 43–122)
ALT SERPL W P-5'-P-CCNC: 57 U/L (ref 9–52)
ANION GAP SERPL CALCULATED.3IONS-SCNC: 7 MMOL/L (ref 5–14)
AST SERPL W P-5'-P-CCNC: 45 U/L (ref 14–36)
ATRIAL RATE: 90 BPM
ATRIAL RATE: 98 BPM
BASOPHILS # BLD AUTO: 0.1 THOUSANDS/ΜL (ref 0–0.1)
BASOPHILS NFR BLD AUTO: 1 % (ref 0–1)
BILIRUB SERPL-MCNC: 0.6 MG/DL
BUN SERPL-MCNC: 23 MG/DL (ref 5–25)
CALCIUM SERPL-MCNC: 8.8 MG/DL (ref 8.4–10.2)
CHLORIDE SERPL-SCNC: 95 MMOL/L (ref 97–108)
CO2 SERPL-SCNC: 33 MMOL/L (ref 22–30)
CREAT SERPL-MCNC: 0.84 MG/DL (ref 0.6–1.2)
EOSINOPHIL # BLD AUTO: 0.5 THOUSAND/ΜL (ref 0–0.4)
EOSINOPHIL NFR BLD AUTO: 4 % (ref 0–6)
ERYTHROCYTE [DISTWIDTH] IN BLOOD BY AUTOMATED COUNT: 15.5 %
GFR SERPL CREATININE-BSD FRML MDRD: 87 ML/MIN/1.73SQ M
GLUCOSE SERPL-MCNC: 212 MG/DL (ref 65–140)
GLUCOSE SERPL-MCNC: 291 MG/DL (ref 65–140)
GLUCOSE SERPL-MCNC: 307 MG/DL (ref 65–140)
GLUCOSE SERPL-MCNC: 311 MG/DL (ref 65–140)
GLUCOSE SERPL-MCNC: 312 MG/DL (ref 70–99)
GLUCOSE SERPL-MCNC: 386 MG/DL (ref 65–140)
HAV IGM SER QL: ABNORMAL
HBV CORE IGM SER QL: ABNORMAL
HBV SURFACE AG SER QL: ABNORMAL
HCT VFR BLD AUTO: 27.2 % (ref 36–46)
HCV AB SER QL: ABNORMAL
HGB BLD-MCNC: 8.7 G/DL (ref 12–16)
LYMPHOCYTES # BLD AUTO: 1.9 THOUSANDS/ΜL (ref 0.5–4)
LYMPHOCYTES NFR BLD AUTO: 14 % (ref 25–45)
MCH RBC QN AUTO: 24.2 PG (ref 26–34)
MCHC RBC AUTO-ENTMCNC: 31.9 G/DL (ref 31–36)
MCV RBC AUTO: 76 FL (ref 80–100)
MONOCYTES # BLD AUTO: 0.9 THOUSAND/ΜL (ref 0.2–0.9)
MONOCYTES NFR BLD AUTO: 7 % (ref 1–10)
NEUTROPHILS # BLD AUTO: 9.7 THOUSANDS/ΜL (ref 1.8–7.8)
NEUTS SEG NFR BLD AUTO: 74 % (ref 45–65)
P AXIS: 21 DEGREES
P AXIS: 30 DEGREES
PLATELET # BLD AUTO: 528 THOUSANDS/UL (ref 150–450)
PMV BLD AUTO: 8 FL (ref 8.9–12.7)
POTASSIUM SERPL-SCNC: 4.1 MMOL/L (ref 3.6–5)
PR INTERVAL: 160 MS
PR INTERVAL: 172 MS
PROT SERPL-MCNC: 7.1 G/DL (ref 5.9–8.4)
QRS AXIS: -3 DEGREES
QRS AXIS: -4 DEGREES
QRSD INTERVAL: 94 MS
QRSD INTERVAL: 96 MS
QT INTERVAL: 396 MS
QT INTERVAL: 398 MS
QTC INTERVAL: 487 MS
QTC INTERVAL: 505 MS
RBC # BLD AUTO: 3.59 MILLION/UL (ref 4–5.2)
SODIUM SERPL-SCNC: 135 MMOL/L (ref 137–147)
T WAVE AXIS: 11 DEGREES
T WAVE AXIS: 17 DEGREES
VENTRICULAR RATE: 90 BPM
VENTRICULAR RATE: 98 BPM
WBC # BLD AUTO: 13.1 THOUSAND/UL (ref 4.5–11)

## 2019-09-17 PROCEDURE — 97163 PT EVAL HIGH COMPLEX 45 MIN: CPT

## 2019-09-17 PROCEDURE — 85025 COMPLETE CBC W/AUTO DIFF WBC: CPT | Performed by: FAMILY MEDICINE

## 2019-09-17 PROCEDURE — 97116 GAIT TRAINING THERAPY: CPT

## 2019-09-17 PROCEDURE — G8979 MOBILITY GOAL STATUS: HCPCS

## 2019-09-17 PROCEDURE — 93005 ELECTROCARDIOGRAM TRACING: CPT

## 2019-09-17 PROCEDURE — 80053 COMPREHEN METABOLIC PANEL: CPT | Performed by: FAMILY MEDICINE

## 2019-09-17 PROCEDURE — 93010 ELECTROCARDIOGRAM REPORT: CPT | Performed by: INTERNAL MEDICINE

## 2019-09-17 PROCEDURE — 86430 RHEUMATOID FACTOR TEST QUAL: CPT | Performed by: FAMILY MEDICINE

## 2019-09-17 PROCEDURE — 82948 REAGENT STRIP/BLOOD GLUCOSE: CPT

## 2019-09-17 PROCEDURE — 99232 SBSQ HOSP IP/OBS MODERATE 35: CPT | Performed by: FAMILY MEDICINE

## 2019-09-17 PROCEDURE — 76705 ECHO EXAM OF ABDOMEN: CPT

## 2019-09-17 PROCEDURE — G8978 MOBILITY CURRENT STATUS: HCPCS

## 2019-09-17 RX ORDER — CEFAZOLIN SODIUM 1 G/50ML
1000 SOLUTION INTRAVENOUS EVERY 8 HOURS
Status: DISCONTINUED | OUTPATIENT
Start: 2019-09-17 | End: 2019-09-19

## 2019-09-17 RX ADMIN — SODIUM CHLORIDE 125 ML/HR: 0.9 INJECTION, SOLUTION INTRAVENOUS at 14:03

## 2019-09-17 RX ADMIN — SODIUM CHLORIDE 125 ML/HR: 0.9 INJECTION, SOLUTION INTRAVENOUS at 03:55

## 2019-09-17 RX ADMIN — INSULIN LISPRO 10 UNITS: 100 INJECTION, SOLUTION INTRAVENOUS; SUBCUTANEOUS at 08:23

## 2019-09-17 RX ADMIN — CEFAZOLIN SODIUM 1000 MG: 1 SOLUTION INTRAVENOUS at 19:57

## 2019-09-17 RX ADMIN — INSULIN LISPRO 3 UNITS: 100 INJECTION, SOLUTION INTRAVENOUS; SUBCUTANEOUS at 16:50

## 2019-09-17 RX ADMIN — ENOXAPARIN SODIUM 40 MG: 40 INJECTION SUBCUTANEOUS at 08:22

## 2019-09-17 RX ADMIN — CEFAZOLIN SODIUM 1000 MG: 1 SOLUTION INTRAVENOUS at 11:24

## 2019-09-17 RX ADMIN — ASPIRIN 81 MG 81 MG: 81 TABLET ORAL at 08:22

## 2019-09-17 RX ADMIN — BUDESONIDE AND FORMOTEROL FUMARATE DIHYDRATE 2 PUFF: 80; 4.5 AEROSOL RESPIRATORY (INHALATION) at 12:29

## 2019-09-17 RX ADMIN — INSULIN LISPRO 4 UNITS: 100 INJECTION, SOLUTION INTRAVENOUS; SUBCUTANEOUS at 22:04

## 2019-09-17 RX ADMIN — SODIUM CHLORIDE 125 ML/HR: 0.9 INJECTION, SOLUTION INTRAVENOUS at 22:02

## 2019-09-17 RX ADMIN — INSULIN LISPRO 3 UNITS: 100 INJECTION, SOLUTION INTRAVENOUS; SUBCUTANEOUS at 08:23

## 2019-09-17 RX ADMIN — GABAPENTIN 100 MG: 100 CAPSULE ORAL at 08:22

## 2019-09-17 RX ADMIN — ATORVASTATIN CALCIUM 40 MG: 40 TABLET, FILM COATED ORAL at 08:21

## 2019-09-17 RX ADMIN — INSULIN LISPRO 2 UNITS: 100 INJECTION, SOLUTION INTRAVENOUS; SUBCUTANEOUS at 12:24

## 2019-09-17 RX ADMIN — GABAPENTIN 100 MG: 100 CAPSULE ORAL at 17:39

## 2019-09-17 RX ADMIN — LOSARTAN POTASSIUM 100 MG: 50 TABLET ORAL at 08:21

## 2019-09-17 RX ADMIN — INSULIN LISPRO 10 UNITS: 100 INJECTION, SOLUTION INTRAVENOUS; SUBCUTANEOUS at 12:33

## 2019-09-17 RX ADMIN — DILTIAZEM HYDROCHLORIDE 420 MG: 240 CAPSULE, EXTENDED RELEASE ORAL at 08:20

## 2019-09-17 RX ADMIN — INSULIN GLARGINE 40 UNITS: 100 INJECTION, SOLUTION SUBCUTANEOUS at 22:05

## 2019-09-17 RX ADMIN — BUDESONIDE AND FORMOTEROL FUMARATE DIHYDRATE 2 PUFF: 80; 4.5 AEROSOL RESPIRATORY (INHALATION) at 17:38

## 2019-09-17 RX ADMIN — ALBUTEROL SULFATE 2 PUFF: 90 AEROSOL, METERED RESPIRATORY (INHALATION) at 22:07

## 2019-09-17 NOTE — ASSESSMENT & PLAN NOTE
· Alkaline phosphatase and ALT and AST improving    Status post ultrasound the official read is pending

## 2019-09-17 NOTE — PLAN OF CARE
Problem: PHYSICAL THERAPY ADULT  Goal: Performs mobility at highest level of function for planned discharge setting  See evaluation for individualized goals  Description  Treatment/Interventions: Functional transfer training, LE strengthening/ROM, Elevations, Therapeutic exercise, Endurance training, Patient/family training, Equipment eval/education, Bed mobility, Gait training, OT, Spoke to nursing  Equipment Recommended: Other (Comment), Cane, Walker(RECOMMEND USE OF RW IF EDEMA IN R UE IMPROVES )       See flowsheet documentation for full assessment, interventions and recommendations  Note:   Prognosis: Good  Problem List: Decreased strength, Decreased endurance, Impaired balance, Decreased mobility, Pain, Obesity  Assessment: PT COMPLETED EVALUATION OF 59YEAR OLD FEMALE ADMITTED TO [address] [place] [address] ON 9/16/19 WITH B/L UE PAIN AND WEAKNESS (R>L)  VAS B/L UE (-) FOR ACUTE DVT  CTA R UE (-) FOR ACUTE ABNORMALITIES  XRAY OF R HAND AND FOREARM (-) FOR ACUTE ABNORMALITIES  CURRENT DIAGNOSIS INCLUDE R/O COMPARTMENT SYNDROME OF R UE (PER VASCULAR SURGERY- PLAN TO CONSULT PLASTIC SURGERY IF SYMPTOMS DO NOT IMPROVE)  PMH IS SIGNIFICANT FOR ANEMIA, ARTHRITIS, DM, HTN, LEGALLY BLIND, SEIZURES, OBESITY, AND CATARACT EXTRACTION  PRIOR TO THIS ADMISSION PATIENT RESIDED WITH SPOUSE AND SON IN AN APARTMENT (3 OUTSIDE STEPS; 6+6 STEPS TO ACCESS APARTMENT)  PRIOR TO THE ONSET OF HER B/L HAND SWELLING, PATIENT REPORTS I WITH MOBILITY (USE OF SPC PRN FOR BALANCE DEFICITS), ADLS, AND IADLS (HAD RECENTLY STARTED WORKING AGAIN)  WITH THE RECENT INABILITY TO USE HER HANDS, PATIENT REPORTS SHE HAS HAD DIFFICULTY MOVING AND HAS NOT BEEN ABLE TO DRESS, BATHE, OF FEED SELF  REPORTS THAT SON AND SPOUSE ARE NOT ABLE TO ASSIST HER  CURRENT IMPAIRMENTS INCLUDE PAIN (4/10 R HAND), EDEMA OF R HAND, REDUCED ACTIVITY TOLERANCE, FALLS RISK, GAIT DEVIATIONS, AND REDUCED ABILITY TO PARTICIPATE IN ADLS   DURING PT EVALUATION PATIENT ABLE TO PERFORM SUPINE-->SIT TRANSFER S LEVEL W/ INCREASED TIME  SHE REQUIRED MIN-AX1 TO PERFORM SIT-->STAND TRANSFER AND TO AMBULATE 10 FEET (BED TO TOILET), HOLDING ONTO WALLS/OBJECTS WITH L HAND  GAIT SPEED IS REDUCED  PATIENT UNABLE TO WIPE SELF ON TOILET AND REQUIRED ASSISTANCE MANAGING SOAP/PAPER TOWELS WHILE WASHING HANDS  THIS PATIENT IS UNABLE TO CURRENTLY CARE FOR SELF WITH B/L UE DEFICITS AND DENIES AVAILABLE HELP AT HOME  PT D/C RECOMMENDATION IS FOR SHORT TERM REHAB  PATIENT WILL BENEFIT FROM CONTINUED SKILLED PT THIS ADMISSION TO ACHIEVE MAXIMAL FUNCTION AND SAFETY  Recommendation: (S) Short-term skilled PT     PT - OK to Discharge: (S) Yes(TO SHORT TERM REHAB WHEN MED SANDI )    See flowsheet documentation for full assessment

## 2019-09-17 NOTE — ASSESSMENT & PLAN NOTE
Lab Results   Component Value Date    HGBA1C 14 6 (H) 08/22/2019       Recent Labs     09/16/19  2319 09/17/19  0312 09/17/19  0548 09/17/19  1126   POCGLU 321* 307* 291* 212*       Blood Sugar Average: Last 72 hrs:  · (P) 298will be npo for now - will place lantus and iss and humalog when starts eating   · Adjust the dose of insulin since persistent hypoglycemia

## 2019-09-17 NOTE — ASSESSMENT & PLAN NOTE
· Progressively getting worse for past week- with weakness- she reports weakness in b/l upper extremities but right worse then left   Unknown etiology at this time   · She has wbc and thrombocytosis   · But cta of upper extremity is negative   · Upper extremity Doppler is negative for any acute dvt  · Patient with pain swelling tenderness and warm to palpation with leukocytosis and low-grade fever-concerning for cellulitis  Will start on antibiotics and monitor  · Vascular surgery evaluation appreciated  Do not appear to have compartment syndrome  Plastic surgery consultation appreciated  Will empirically start on antibiotics and monitor    · Oxycodone for pain   · Esr -is above 130 which is very higher than what is expected from an infection  · Uric acid -within normal limits  · Given high ESR Will obtain a rheumatology workup with rheumatoid factor, JUAN

## 2019-09-17 NOTE — PROGRESS NOTES
Less right hand pain today  Decreased range of motion of right wrist and fingers  Right hand less swollen today  Positive palpable right radial pulse  Plan:  Rest, ice, compression and elevation              Will ask plastic surgery to see

## 2019-09-17 NOTE — PROGRESS NOTES
Patient resting OOB in chair  Right hand ACE wrap removed  Hand slightly swollen  No redness observed  Hand tender  All fingers and thumb capable of flexion and extension  No violation of skin detected  Redressed with light kerlex wrap and ACE wrap  Patient instructed to move fingers or manually motivate with other hand to maintain mobility  We will continue to observe

## 2019-09-17 NOTE — ASSESSMENT & PLAN NOTE
· Chronic microcytic but keeps dropping from previous - check iron panel and ferritin , fecal occult- no report of bleeding  · Hemoglobin down to 8 7  · Monitor

## 2019-09-17 NOTE — PHYSICAL THERAPY NOTE
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, with long-term current use of insulin ([address] )    Hypertension    Seizures ([address] )    Exertional dyspnea    Enlarged pulmonary artery (HCC)    Aortic dilatation (HCC)    Anemia    Decreased pulses in feet    Diabetes mellitus type 2 with complications, uncontrolled (HCC)    Hyperlipidemia    Microalbuminuria    Mild obstructive sleep apnea    Class 3 severe obesity with serious comorbidity and body mass index (BMI) of 45 0 to 49 9 in [facility])    Peripheral neuropathy    Prolonged QT interval    Visual impairment in both eyes    Vitamin D deficiency    Lung nodules    Orthostatic hypotension    Mild intermittent asthma with exacerbation    Type 2 diabetes mellitus with microalbuminuria, with long-term current use of insulin (HCC)    Frequent headaches    Pain and swelling of right upper extremity    Transaminitis       Past Medical History  Past Medical History:   Diagnosis Date    Anemia     Arthritis     Diabetes mellitus ([address] )     Dyspnea on exertion     Hyperlipidemia     Hypertension     Legally blind 2010    Mild intermittent asthma with exacerbation 8/4/2018    Miscarriage     x 3    Seizures (HCC)     Sickle cell trait ([address] )     Sleep apnea        Past Surgical History  Past Surgical History:   Procedure Laterality Date    CATARACT EXTRACTION Bilateral     august and september    EYE SURGERY      HYSTERECTOMY      39    OOPHORECTOMY Left     39        09/17/19 6759   Note Type   Note type Eval/Treat   Pain Assessment   Pain Assessment 0-10   Pain Score 4   Pain Type Acute pain   Pain Location Hand Effect of Pain on Daily Activities NOT ABLE TO USE RW- REDUCED ABILITY TO AMBULATE   Patient's Stated Pain Goal No pain   Hospital Pain Intervention(s) Ambulation/increased activity   Response to Interventions TOLERATED   Home Living   Type of [facility] One level;Stairs to enter with rails  (3 OUTSIDE STEPS; 6 + 6 STEPS TO APARTMENT )   Bathroom Shower/Tub Tub/shower unit   Bathroom Equipment Shower chair;Hand-held shower   Bathroom Accessibility Accessible   Home Equipment Cane  (SPOUSE USES RW AND WC)   Additional Comments  PRIOR TO THIS ADMISSION PATIENT RESIDED WITH SPOUSE AND SON IN AN APARTMENT (3 OUTSIDE STEPS; 6+6 STEPS TO ACCESS APARTMENT)  PRIOR TO THE ONSET OF HER B/L HAND SWELLING, PATIENT REPORTS I WITH MOBILITY (USE OF SPC PRN FOR BALANCE DEFICITS), ADLS, AND IADLS (HAD RECENTLY STARTED WORKING AGAIN)  WITH THE RECENT INABILITY TO USE HER HANDS, PATIENT REPORTS SHE HAS HAD DIFFICULTY MOVING AND HAS NOT BEEN ABLE TO DRESS, BATHE, OF FEED SELF  REPORTS THAT SON AND SPOUSE ARE NOT ABLE TO ASSIST HER  Prior Function   Level of [place] Needs assistance with ADLs and functional mobility; Needs assistance with IADLs   Lives With Spouse; Son   Wilfredo Help From   (REPORTS FAMILY NOT ABLE TO ASSIST HER)   ADL Assistance Needs assistance   IADLs Needs assistance   Falls in the last 6 months 0  ("1 CLOSE FALL WHILE ALMOST PASSING OUT")   Vocational   (REPORTS SHE IS WAITING FOR DISABILITY )   Restrictions/Precautions   Weight Bearing Precautions Per Order   (NOT SPECIFIED; PATIENT DOING NWB R HAND AND PWB L HAND )   Braces or Orthoses   (ACE BANDAGE ON R UE; KEEP ELEVATED)   Other Precautions Pain; Fall Risk;Multiple lines   General   Family/Caregiver Present No   Cognition   Overall Cognitive Status WFL   Arousal/Participation Alert   Orientation Level Oriented X4   Memory Within functional limits   Following Commands Follows multistep commands without difficulty   RUE Assessment   RUE Assessment X  (SHOULDER FLEXION TO APPROX 70 DEGREES; DECREASED  )   LUE Assessment   LUE Assessment X  (SHOULDER FLEXION TO APPROX 90 DEGREES; REDUCED  )   RLE Assessment   RLE Assessment WFL  (4-/5 GROSSLY )   LLE Assessment   LLE Assessment WFL  (4-/5 GROSSLY )   Bed Mobility   Supine to Sit 5  Supervision   Additional items Increased time required   Sit to Supine Unable to assess   Transfers   Sit to Stand 4  Minimal assistance   Additional items Assist x 1; Increased time required   Stand to Sit 5  Supervision   Additional items Increased time required   Ambulation/Elevation   Gait pattern Excessively slow   Gait Assistance 4  Minimal assist   Additional items Assist x 1   Assistive Device None; Other (Comment)  (HOLDING ONTO WALL/OBJECTS)   Distance 10 FEET   Balance   Static Sitting Good   Static Standing Fair   Ambulatory Fair -   Endurance Deficit   Endurance Deficit Yes   Endurance Deficit Description OBESITY; PAIN   Activity Tolerance   Activity Tolerance Patient limited by fatigue;Patient limited by pain   Medical Staff Made Aware SPOKE WITH CM    Nurse Made Aware SANDI TO SEE PER RN DANYELLE   Assessment   Prognosis Good   Problem List Decreased strength;Decreased endurance; Impaired balance;Decreased mobility;Pain;Obesity   Assessment PT COMPLETED EVALUATION OF 59YEAR OLD FEMALE ADMITTED TO [address] [place] [address] ON 9/16/19 WITH B/L UE PAIN AND WEAKNESS (R>L)  VAS B/L UE (-) FOR ACUTE DVT  CTA R UE (-) FOR ACUTE ABNORMALITIES  XRAY OF R HAND AND FOREARM (-) FOR ACUTE ABNORMALITIES  CURRENT DIAGNOSIS INCLUDE R/O COMPARTMENT SYNDROME OF R UE (PER VASCULAR SURGERY- PLAN TO CONSULT PLASTIC SURGERY IF SYMPTOMS DO NOT IMPROVE)  PMH IS SIGNIFICANT FOR ANEMIA, ARTHRITIS, DM, HTN, LEGALLY BLIND, SEIZURES, OBESITY, AND CATARACT EXTRACTION  PRIOR TO THIS ADMISSION PATIENT RESIDED WITH SPOUSE AND SON IN AN APARTMENT (3 OUTSIDE STEPS; 6+6 STEPS TO ACCESS APARTMENT)   PRIOR TO THE ONSET OF HER B/L HAND SWELLING, PATIENT REPORTS I WITH MOBILITY (USE OF SPC PRN FOR BALANCE DEFICITS), ADLS, AND IADLS (HAD RECENTLY STARTED WORKING AGAIN)  WITH THE RECENT INABILITY TO USE HER HANDS, PATIENT REPORTS SHE HAS HAD DIFFICULTY MOVING AND HAS NOT BEEN ABLE TO DRESS, BATHE, OF FEED SELF  REPORTS THAT SON AND SPOUSE ARE NOT ABLE TO ASSIST HER  CURRENT IMPAIRMENTS INCLUDE PAIN (4/10 R HAND), EDEMA OF R HAND, REDUCED ACTIVITY TOLERANCE, FALLS RISK, GAIT DEVIATIONS, AND REDUCED ABILITY TO PARTICIPATE IN ADLS  DURING PT EVALUATION PATIENT ABLE TO PERFORM SUPINE-->SIT TRANSFER S LEVEL W/ INCREASED TIME  SHE REQUIRED MIN-AX1 TO PERFORM SIT-->STAND TRANSFER AND TO AMBULATE 10 FEET (BED TO TOILET), HOLDING ONTO WALLS/OBJECTS WITH L HAND  GAIT SPEED IS REDUCED  PATIENT UNABLE TO WIPE SELF ON TOILET AND REQUIRED ASSISTANCE MANAGING SOAP/PAPER TOWELS WHILE WASHING HANDS  THIS PATIENT IS UNABLE TO CURRENTLY CARE FOR SELF WITH B/L UE DEFICITS AND DENIES AVAILABLE HELP AT HOME  PT D/C RECOMMENDATION IS FOR SHORT TERM REHAB  PATIENT WILL BENEFIT FROM CONTINUED SKILLED PT THIS ADMISSION TO ACHIEVE MAXIMAL FUNCTION AND SAFETY  Goals   Patient Goals TO USE THE BATHROOM    LTG Expiration Date 10/01/19   Long Term Goal #1 1) PERFORM BED MOBILITY MOD-I TO PARTICIPATE IN FREQUENT REPOSITIONING AND IMPROVE SKIN INTEGRITY; 2) PERFORM SIT<-->STAND TRANSFER MOD-I TO PROMOTE I WITH TOILETING AND OOB MOBILITY; 3) AMBULATE 200 FEET MOD-I W/ LEAST RESTRICTIVE DEVICE TO PARTICIPATE IN HOUSEHOLD AND COMMUNITY LEVEL AMBULATION; 4) IMPROVE B/L LE STRENGTH BY 1/2 GRADE TO IMPROVE EFFICIENCY OF TRANSFERS; 5) IMPROVE BALANCE BY 1 GRADE TO REDUCE RISK FOR FALLS; 6) NAVIGATE 12 STEPS S LEVEL IN ORDER TO SAFELY NAVIGATE MULTIPLE FLOORS AT HOME  Treatment Day 0   Plan   Treatment/Interventions Functional transfer training;LE strengthening/ROM; Elevations; Therapeutic exercise; Endurance training;Patient/family training;Equipment eval/education; Bed mobility;Gait training;OT;Spoke to nursing   PT Frequency Other (Comment)  (3-5X/WK; BID PRN )   Recommendation   Recommendation Short-term skilled PT   Equipment Recommended Other (Comment);Cane;Walker  (RECOMMEND USE OF RW IF EDEMA IN R UE IMPROVES )   PT - OK to Discharge Yes  (TO SHORT TERM REHAB WHEN MED SANDI )   Barthel Index   Feeding 5   Bathing 0   Grooming Score 0   Dressing Score 0   Bladder Score 10   Bowels Score 10   Toilet Use Score 5   Transfers (Bed/Chair) Score 10   Mobility (Level Surface) Score 0   Stairs Score 0   Barthel Index Score 40     ADDITIONAL TREATMENT SESSION PERFORMED TODAY BELOW:    SUBJECTIVE: "I AM NORMALLY THE ONE THAT HELPS OTHERS AND NOW I NEED THE HELP AND NO ONE CAN HELP ME"    OBJECTIVE: DURING GAIT TRAINING PT RETREIVED SPC TO ASSIST PATIENT IN IMPROVING SAFETY AND EFFICIENCY OF AMBULATION  GIVEN REDUCED SPEED AND LE CLEARANCE WHILE AMBULATING, PATIENT WOULD LIKELY BENEFIT FROM USE OF RW HOWEVER SECONDARY TO R UE DEFICITS, SHE IS ONLY ABLE TO USE ONE HANDED AD AT THIS TIME  PATIENT AMBULATED 15 FEET W/ SPC IN L UE, REQUIRING MIN-AX1 FOR STEADYING  + TRENDELBERG GAIT  VC REQUIRED FOR SPC/LE SEQUENCING  ASSESSMENT: THIS PATIENT'S FUNCTIONAL MOBILITY CONTINUES TO BE LIMITED BY DECREASED UTILIZATION OF B/L UE (R>L) AND REDUCED ACTIVITY TOLERANCE  PATIENT AMBULATED 15 FEET MIN-AX1 W/ SPC IN L UE  DISTANCE LIMITED BY SELF REPORTED FATIGUE AND MILD DIZZINESS  THIS PATIENT CONTINUES TO FUNCTION BELOW HER BASELINE AND REMAINS APPROPRIATE FOR PT D/C RECOMMENDATION FOR SHORT TERM REHAB  WOULD HAVE CONCERNS ABOUT D/C HOME WITHOUT FAMILY ASSISTANCE AND WITH 12 STEPS PATIENT MUST DO TO ENTER APT  SHE WILL BENEFIT FROM CONTINUED SKILLED PT THIS ADMISSION TO ACHIEVE MAXIMAL FUNCTION AND SAFETY  PLAN: NEXT SESSION PLAN TO PROGRESS AMBULATORY DISTANCE AND TRIAL STAIRS AS TOLERATED AND APPROPRIATE       Rocio Parnell, PT

## 2019-09-17 NOTE — UTILIZATION REVIEW
Initial Clinical Review    Admission: Date/Time/Statement: Inpatient Admission Orders (From admission, onward)     Ordered        09/16/19 1701  Inpatient Admission  Once                   Orders Placed This Encounter   Procedures    Inpatient Admission     Standing Status:   Standing     Number of Occurrences:   1     Order Specific Question:   Admitting Physician     Answer:   Julee July [[id number]]     Order Specific Question:   Level of Care     Answer:   Med Surg [16]     Order Specific Question:   Estimated length of stay     Answer:   More than 2 Midnights     Order Specific Question:   Certification     Answer:   I certify that inpatient services are medically necessary for this patient for a duration of greater than two midnights  See H&P and MD Progress Notes for additional information about the patient's course of treatment  ED Arrival Information     Expected Arrival Acuity Means of Arrival Escorted By Service Admission Type    - 9/16/2019 09:31 Urgent [facility] ([facility]) Hospitalist Urgent    Arrival Complaint    arm pain        Chief Complaint   Patient presents with    Hand Swelling     Right hand swelling for days and weakness in arms, no known trauma  Patient reports seen recently for same and was told to see neuro  Has appt with Neuro on friday   Extremity Weakness     bilateral for days   and son not helping her take care of herself or helping with her meds  Assessment/Plan: 58 y/o female presents to ED from home by EMS with worsening RUE pain, paresthesias, swelling over past week  Reports difficulty caring for herself due to pain  On exam, +2 edema from R mid forearm to distal finger tips, unable to raise R arm, uses L hand to hold R arm at shoulder height, decreased ROM of all fingers of R hand  Admitted as inpatient due to pain and swelling of RUE, prolonged QT, transaminitis  Unknown etiology of RUE pain/swelling    No evidence of cellulitis  Vascular surgery consult for concern for compartment syndrome  NPO  Pain control  Repeat EKG  Avoid QT prolonging meds  Abd US   9/16 Vascular surgery consult:  Right forearm,  wrist and hand pain and swelling  Rule out compartment syndrome  Rest, ice, compression, elevation  Hand surgery consult if no improvement  9/17 Less hand pain today  R hand less swollen  Continued decreased ROM R wrist and fingers  Positive palpable R radial pulse  Plastic surgery consult  Continued WBC elevation  Start IV antibiotics  9/17 Plastic surgery consult:  Hand swollen and tender  No redness  All fingers and thumb capable of flexion and extension  No violation of skin detected  Redressed with light kerlex wrap and ACE wrap  Patient instructed to move fingers or manually motivate with other hand to maintain mobility  We will continue to observe  9/17 Attending note:  ESR >130, higher than expected from infection  Obtain rheumatoid factor, JUAN      ED Triage Vitals   Temperature Pulse Respirations Blood Pressure SpO2   09/16/19 0936 09/16/19 0936 09/16/19 0936 09/16/19 0936 09/16/19 0936   100 3 °F (37 9 °C) 100 20 (!) 172/84 98 %      Temp Source Heart Rate Source Patient Position - Orthostatic VS BP Location FiO2 (%)   09/16/19 1140 09/16/19 0936 09/16/19 0936 09/16/19 0936 --   Oral Monitor Lying Left arm       Pain Score       09/16/19 0936       Worst Possible Pain        Wt Readings from Last 1 Encounters:   09/16/19 (!) 137 kg (301 lb 9 4 oz)     Additional Vital Signs:   Date/Time Temp Pulse Resp BP SpO2 O2 Device   09/17/19 0826 99 3 °F (37 4 °C) -- -- -- -- --   09/17/19 0718 99 2 °F (37 3 °C) 89 20 150/80 97 % None (Room air)   09/16/19 2351 99 7 °F (37 6 °C) -- -- -- -- --   09/16/19 2331 99 1 °F (37 3 °C) 93 18 149/67 94 % None (Room air)   09/16/19 1906 98 9 °F (37 2 °C) 95 18 158/75 97 % None (Room air)   09/16/19 1623 -- 84 16 148/83 97 % None (Room air)   09/16/19 1405 98 1 °F (36 7 °C) 91 16 135/76 96 % None (Room air)   09/16/19 1140 99 7 °F (37 6 °C) 96 16 149/75 96 % None (Room air)       Pertinent Labs/Diagnostic Test Results:   Results from last 7 days   Lab Units 09/17/19  0524 09/16/19  0945   WBC Thousand/uL 13 10* 14 70*   HEMOGLOBIN g/dL 8 7* 9 5*   HEMATOCRIT % 27 2* 29 2*   PLATELETS Thousands/uL 528* 520*   NEUTROS ABS Thousands/µL 9 70* 11 30*     Results from last 7 days   Lab Units 09/17/19  0524 09/16/19  0945   SODIUM mmol/L 135* 135*   POTASSIUM mmol/L 4 1 4 5   CHLORIDE mmol/L 95* 92*   CO2 mmol/L 33* 34*   ANION GAP mmol/L 7 9   BUN mg/dL 23 26*   CREATININE mg/dL 0 84 0 84   EGFR ml/min/1 73sq m 87 87   CALCIUM mg/dL 8 8 9 0     Results from last 7 days   Lab Units 09/17/19  0524 09/16/19  0945   AST U/L 45* 64*   ALT U/L 57* 59*   ALK PHOS U/L 291* 325*   TOTAL PROTEIN g/dL 7 1 8 0   ALBUMIN g/dL 3 5 3 8   TOTAL BILIRUBIN mg/dL 0 60 1 10     Results from last 7 days   Lab Units 09/17/19  0548 09/17/19  0312 09/16/19  2319 09/16/19  2022   POC GLUCOSE mg/dl 291* 307* 321* 359*     Results from last 7 days   Lab Units 09/17/19  0524 09/16/19  0945   GLUCOSE RANDOM mg/dL 312* 350*     Results from last 7 days   Lab Units 09/16/19  1713   CK TOTAL U/L 55     Results from last 7 days   Lab Units 09/16/19  0945   TROPONIN I ng/mL <0 01     Results from last 7 days   Lab Units 09/16/19  0945   TSH 3RD GENERATON uIU/mL 1 710     Results from last 7 days   Lab Units 09/16/19  0945   NT-PRO BNP pg/mL 136     Results from last 7 days   Lab Units 09/16/19  0945   FERRITIN ng/mL 560*     Results from last 7 days   Lab Units 09/16/19  0945   SED RATE mm/hour >130*     Results from last 7 days   Lab Units 09/16/19  1201   CLARITY UA  Clear   COLOR UA  Cathi*   SPEC GRAV UA  1 015   PH UA  6 0   GLUCOSE UA mg/dl 250 (1/4%)*   KETONES UA mg/dl Negative   BLOOD UA  Negative   PROTEIN UA mg/dl 30 (1+)*   NITRITE UA  Negative   BILIRUBIN UA  Negative   UROBILINOGEN UA mg/dL 1 0 LEUKOCYTES UA  Negative   WBC UA /hpf 1-2*   RBC UA /hpf None Seen   BACTERIA UA /hpf Occasional   EPITHELIAL CELLS WET PREP /hpf Occasional     9/16 EKG:  Normal sinus rhythm  Moderate voltage criteria for LVH, may be normal variant  Prolonged     9/17 EKG:  Normal sinus rhythm  Moderate voltage criteria for LVH, may be normal variant  Prolonged     9/16 Xray R hand:  No acute osseous abnormality  Dorsal soft tissue swelling  Degenerative changes of 1st carpometacarpal joint, radiocarpal joint and scattered throughout the DIP and PIP joints  9/16 Xray R forearm:  No acute osseous abnormality  9/16 CXR:  No acute cardiopulmonary disease  9/16 CTA RUE:  Normal CTA of the right upper extremity  Enlarged left submandibular lymph node    9/16 UE venous duplex:  Impression  RIGHT UPPER LIMB:  No evidence of acute or chronic deep vein thrombosis  No evidence of superficial thrombophlebitis noted  Doppler evaluation shows a normal response to augmentation maneuvers  LEFT UPPER LIMB LIMITED:  Evaluation shows no evidence of thrombus in the internal jugular vein,  subclavian vein, and the brachiocephalic vein      US gallbladder pending    ED Treatment:   Medication Administration from 09/16/2019 0931 to 09/16/2019 1905       Date/Time Order Dose Route Action     09/16/2019 1031 acetaminophen (TYLENOL) tablet 975 mg 975 mg Oral Given     09/16/2019 1830 budesonide-formoterol (SYMBICORT) 80-4 5 MCG/ACT inhaler 2 puff 2 puff Inhalation Given     09/16/2019 1831 gabapentin (NEURONTIN) capsule 100 mg 100 mg Oral Given     09/16/2019 1742 insulin lispro (HumaLOG) 100 units/mL subcutaneous injection 10 Units 10 Units Subcutaneous Given        Past Medical History:   Diagnosis Date    Anemia     Arthritis     Diabetes mellitus ([address] )     Dyspnea on exertion     Hyperlipidemia     Hypertension     Legally blind 2010    Mild intermittent asthma with exacerbation 8/4/2018    Miscarriage     x 3    [facility]ures [facility]     Sickle cell trait ([facility] )     Sleep apnea      Present on Admission:   Mild intermittent asthma with exacerbation   Anemia   Prolonged QT interval   Frequent headaches   Hyperlipidemia   Hypertension      Admitting Diagnosis: Leukocytosis [D72 829]  Peripheral edema [R60 9]  Weakness [R53 1]  Hand swelling [M79 89]  Pain and swelling of right upper extremity [M79 601, M79 89]  Age/Sex: 59 y o  female  Admission Orders:    Current Facility-Administered Medications:  acetaminophen 650 mg Oral Q6H PRN   albuterol 2 puff Inhalation Q6H PRN   aspirin 81 mg Oral Daily   atorvastatin 40 mg Oral Daily   budesonide-formoterol 2 puff Inhalation BID   cefazolin 1,000 mg Intravenous Q8H   diltiazem 420 mg Oral Daily   enoxaparin 40 mg Subcutaneous Q24H RONI   gabapentin 100 mg Oral BID   insulin glargine 40 Units Subcutaneous HS   insulin lispro 1-5 Units Subcutaneous TID AC   insulin lispro 1-5 Units Subcutaneous HS   insulin lispro 10 Units Subcutaneous TID With Meals   insulin regular 5 Units Subcutaneous Once   losartan 100 mg Oral Daily   morphine injection 4 mg Intravenous Q4H PRN x1   oxyCODONE 5 mg Oral Q6H PRN   sodium chloride 125 mL/hr Intravenous Continuous       IP CONSULT TO VASCULAR SURGERY  IP CONSULT TO PLASTIC SURGERY   Elevate extremity  SCDs  VS  Stool occult blood  PT/OT    Network Utilization Review Department  Phone: [phone]; Fax [phone]  [email or web address]  ATTENTION: Please call with any questions or concerns to [phone]  and carefully listen to the prompts so that you are directed to the right person  Send all requests for admission clinical reviews, approved or denied determinations and any other requests to fax [phone]   All voicemails are confidential

## 2019-09-17 NOTE — PLAN OF CARE
Initiated Plan     Problem: PAIN - ADULT  Goal: Verbalizes/displays adequate comfort level or baseline comfort level  Description  Interventions:  - Encourage patient to monitor pain and request assistance  - Assess pain using appropriate pain scale  - Administer analgesics based on type and severity of pain and evaluate response  - Implement non-pharmacological measures as appropriate and evaluate response  - Consider cultural and social influences on pain and pain management  - Notify physician/advanced practitioner if interventions unsuccessful or patient reports new pain  Outcome: Progressing     Problem: INFECTION - ADULT  Goal: Absence or prevention of progression during hospitalization  Description  INTERVENTIONS:  - Assess and monitor for signs and symptoms of infection  - Monitor lab/diagnostic results  - Monitor all insertion sites, i e  indwelling lines, tubes, and drains  - Monitor endotracheal if appropriate and nasal secretions for changes in amount and color  - [place] appropriate cooling/warming therapies per order  - Administer medications as ordered  - Instruct and encourage patient and family to use good hand hygiene technique  - Identify and instruct in appropriate isolation precautions for identified infection/condition  Outcome: Progressing     Problem: SAFETY ADULT  Goal: Patient will remain free of falls  Description  INTERVENTIONS:  - Assess patient frequently for physical needs  -  Identify cognitive and physical deficits and behaviors that affect risk of falls    -  [place] fall precautions as indicated by assessment   - Educate patient/family on patient safety including physical limitations  - Instruct patient to call for assistance with activity based on assessment  - Modify environment to reduce risk of injury  - Consider OT/PT consult to assist with strengthening/mobility  Outcome: Progressing  Goal: Maintain or return to baseline ADL function  Description  INTERVENTIONS:  -  Assess patient's ability to carry out ADLs; assess patient's baseline for ADL function and identify physical deficits which impact ability to perform ADLs (bathing, care of mouth/teeth, toileting, grooming, dressing, etc )  - Assess/evaluate cause of self-care deficits   - Assess range of motion  - Assess patient's mobility; develop plan if impaired  - Assess patient's need for assistive devices and provide as appropriate  - Encourage maximum independence but intervene and supervise when necessary  - Involve family in performance of ADLs  - Assess for home care needs following discharge   - Consider OT consult to assist with ADL evaluation and planning for discharge  - Provide patient education as appropriate  Outcome: Progressing  Goal: Maintain or return mobility status to optimal level  Description  INTERVENTIONS:  - Assess patient's baseline mobility status (ambulation, transfers, stairs, etc )    - Identify cognitive and physical deficits and behaviors that affect mobility  - Identify mobility aids required to assist with transfers and/or ambulation (gait belt, sit-to-stand, lift, walker, cane, etc )  - [place] fall precautions as indicated by assessment  - Record patient progress and toleration of activity level on Mobility SBAR; progress patient to next Phase/Stage  - Instruct patient to call for assistance with activity based on assessment  - Consider rehabilitation consult to assist with strengthening/weightbearing, etc   Outcome: Progressing     Problem: DISCHARGE PLANNING  Goal: Discharge to home or other facility with appropriate resources  Description  INTERVENTIONS:  - Identify barriers to discharge w/patient and caregiver  - Arrange for needed discharge resources and transportation as appropriate  - Identify discharge learning needs (meds, wound care, etc )  - Arrange for interpretive services to assist at discharge as needed  - Refer to Case Management Department for coordinating discharge planning if the patient needs post-hospital services based on physician/advanced practitioner order or complex needs related to functional status, cognitive ability, or social support system  Outcome: Progressing     Problem: Knowledge Deficit  Goal: Patient/family/caregiver demonstrates understanding of disease process, treatment plan, medications, and discharge instructions  Description  Complete learning assessment and assess knowledge base    Interventions:  - Provide teaching at level of understanding  - Provide teaching via preferred learning methods  Outcome: Progressing

## 2019-09-17 NOTE — PLAN OF CARE
Problem: DISCHARGE PLANNING - CARE MANAGEMENT  Goal: Discharge to post-acute care or home with appropriate resources  Description  INTERVENTIONS:  - Conduct assessment to determine patient/family and health care team treatment goals, and need for post-acute services based on payer coverage, community resources, and patient preferences, and barriers to discharge  - Address psychosocial, clinical, and financial barriers to discharge as identified in assessment in conjunction with the patient/family and health care team  - Arrange appropriate level of post-acute services according to patients   needs and preference and payer coverage in collaboration with the physician and health care team  - Communicate with and update the patient/family, physician, and health care team regarding progress on the discharge plan  - Arrange appropriate transportation to post-acute venues  Outcome: Progressing  - Social work will follow for Pt, OT and any DME needs for discharge

## 2019-09-17 NOTE — SOCIAL WORK
LOS: 1 GMLOS: none    Pt is not a bundle pt or a 30 day readmission  Pt admitted and being treated for pain and swelling of right upper extremity  PT, OT, plastic surgery and vascular surgery consulted  SW met w/ pt to complete assessment  Pt was alert and able to answer assessment questions  SW confirmed that pt's emergency contacts are her son Brenda Boast ([phone]) and her  Milagros Vincent ([phone])  Pt does not have any advanced directives  Pt is  and disabled  Pt receives SSD  Pt is a full time employee with [employer], where she works from home  Pt's  and son live w/ her in a 2nd floor apartment w/ # JESSICA from outside + 6, a platform and then 6 more stairs  Pt is not receiving any in home services and is not receiving any from the community  Pt owns a SPC and a CPAP  Pt does not currently have her CPAP with her, nursing notified  Pt's PCP is Arsen Hannah MD  Pt does not have mental health hx, does not have substance abuse hx, is not a , does not have any legal issues, and has not stayed at a SNF in the last 60 days  Pt's preferred pharmacy is the [facility]  Pt stated that she was able to perform all ADLs independently prior to presentation  Pt's  and son are at home w/ her 24/7  Pt's  is w/c bound  Pt stated that her son has some mental health issues  Pt stating that she needs help with organizing her medications for each day and when she tried to get help from her son and  they could not help  Pt stated that due to her pain and swelling of her arms she was unable to administer her diabetic medications and manage her personal hygiene  PT is recommending STR  SW discussed STR w/ pt and discussed list of providers  Pt is preferring to go to [facility]  SW discussed that when she is discharged from [address] she may be able to have VNA arranged to have SN assist with medications   SW will send referral to [facility]  SW will follow for plan of care  Pt had no other questions or concerns

## 2019-09-17 NOTE — NURSING NOTE
On initial rounds patient in no apparent distress  Skin warm and dry to touch  Pleasant and cooperative  Is legally blind, all personal items within reach and clutter removed  Did ambulate to bathroom without assistance but stand by contact  NS @ 125 cc per hour infusing well  Right hand remain swollen does have ace wrap in place  All safety maintained  Will continue to monitor

## 2019-09-17 NOTE — PROGRESS NOTES
Progress Note - Brittanyjodi Jimenez 1960, 59 y o  female MRN: [id number]    Unit/Bed#:  -01 Encounter: [id number]    Primary Care Provider: Luh Saini MD   Date and time admitted to hospital: 9/16/2019  9:32 AM        * Pain and swelling of right upper extremity  Assessment & Plan  · Progressively getting worse for past week- with weakness- she reports weakness in b/l upper extremities but right worse then left   Unknown etiology at this time   · She has wbc and thrombocytosis   · But cta of upper extremity is negative   · Upper extremity Doppler is negative for any acute dvt  · Patient with pain swelling tenderness and warm to palpation with leukocytosis and low-grade fever-concerning for cellulitis  Will start on antibiotics and monitor  · Vascular surgery evaluation appreciated  Do not appear to have compartment syndrome  Plastic surgery consultation appreciated  Will empirically start on antibiotics and monitor  · Oxycodone for pain   · Esr -is above 130 which is very higher than what is expected from an infection  · Uric acid -within normal limits  · Given high ESR Will obtain a rheumatology workup with rheumatoid factor, JUAN    Transaminitis  Assessment & Plan  · Alkaline phosphatase and ALT and AST improving    Status post ultrasound the official read is pending    Frequent headaches  Assessment & Plan  · Sounds like she has migraines not now - had ct done on 9/09 neg     Mild intermittent asthma with exacerbation  Assessment & Plan  · Resume home medications of symbicort   · No acute exacerbation      Prolonged QT interval  Assessment & Plan  · qtc 487-   · Avoid QT prolonging medications    Hyperlipidemia  Assessment & Plan  · Resume statin   · LFTs improving    Anemia  Assessment & Plan  · Chronic microcytic but keeps dropping from previous - check iron panel and ferritin , fecal occult- no report of bleeding  · Hemoglobin down to 8 7  · Monitor    Hypertension  Assessment & Plan  · Blood pressure acceptable  Continue with the current care     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, with long-term current use of insulin [facility])  Assessment & Plan  Lab Results   Component Value Date    HGBA1C 14 6 (H) 2019       Recent Labs     19  2319 19  0312 19  0548 19  1126   POCGLU 321* 307* 291* 212*       Blood Sugar Average: Last 72 hrs:  · (P) 298will be npo for now - will place lantus and iss and humalog when starts eating   · Adjust the dose of insulin since persistent hypoglycemia  VTE Pharmacologic Prophylaxis:   Pharmacologic: Enoxaparin (Lovenox)  Mechanical VTE Prophylaxis in Place: Yes    Patient Centered Rounds: I have performed bedside rounds with nursing staff today  Discussions with Specialists or Other Care Team Provider:  Plastic surgery    Education and Discussions with Family / Patient:  Patient and family    Time Spent for Care: 30 minutes  More than 50% of total time spent on counseling and coordination of care as described above  Current Length of Stay: 1 day(s)    Current Patient Status: Inpatient   Certification Statement:  Patient need continued inpatient stay secondary to past surgery evaluation  Discharge Plan:     Code Status: Level 1 - Full Code      Subjective:   Patient seen and examined  Reported that the right upper extremity swelling is improving  Still with the decreased range of movement    Objective:     Vitals:   Temp (24hrs), Av 1 °F (37 3 °C), Min:98 1 °F (36 7 °C), Max:99 7 °F (37 6 °C)    Temp:  [98 1 °F (36 7 °C)-99 7 °F (37 6 °C)] 99 3 °F (37 4 °C)  HR:  [84-95] 89  Resp:  [16-20] 20  BP: (135-158)/(67-83) 150/80  SpO2:  [94 %-97 %] 97 %  Body mass index is 45 86 kg/m²  Input and Output Summary (last 24 hours):        Intake/Output Summary (Last 24 hours) at 2019 1208  Last data filed at 2019 1943  Gross per 24 hour   Intake --   Output 200 ml   Net -200 ml Physical Exam:     Physical Exam   Constitutional: No distress  Eyes: Right eye exhibits no discharge  Left eye exhibits no discharge  Neck: No JVD present  Cardiovascular: Normal rate  No murmur heard  Pulmonary/Chest: Effort normal  No respiratory distress  Abdominal: Soft  Bowel sounds are normal  She exhibits no distension  Musculoskeletal: Normal range of motion  She exhibits edema (Right hand swelling tenderness to palpation and warmth present  t)  Neurological: She is alert  No cranial nerve deficit  Skin: Skin is warm  She is not diaphoretic  No erythema  Additional Data:     Labs:    Results from last 7 days   Lab Units 09/17/19  0524   WBC Thousand/uL 13 10*   HEMOGLOBIN g/dL 8 7*   HEMATOCRIT % 27 2*   PLATELETS Thousands/uL 528*   NEUTROS PCT % 74*   LYMPHS PCT % 14*   MONOS PCT % 7   EOS PCT % 4     Results from last 7 days   Lab Units 09/17/19  0524   POTASSIUM mmol/L 4 1   CHLORIDE mmol/L 95*   CO2 mmol/L 33*   BUN mg/dL 23   CREATININE mg/dL 0 84   CALCIUM mg/dL 8 8   ALK PHOS U/L 291*   ALT U/L 57*   AST U/L 45*           * I Have Reviewed All Lab Data Listed Above  * Additional Pertinent Lab Tests Reviewed:  rAun Tolliver Admission Reviewed    Imaging:    Imaging Reports Reviewed Today Include:   Imaging Personally Reviewed by Myself Includes:      Recent Cultures (last 7 days):           Last 24 Hours Medication List:     Current Facility-Administered Medications:  acetaminophen 650 mg Oral Q6H PRN González Rides, DO    albuterol 2 puff Inhalation Q6H PRN Margaret Arias MD    aspirin 81 mg Oral Daily Margaret Arias MD    atorvastatin 40 mg Oral Daily Margaret Arias MD    budesonide-formoterol 2 puff Inhalation BID Margaret Arias MD    cefazolin 1,000 mg Intravenous Q8H Jose Elias Rodríguez MD Last Rate: 1,000 mg (09/17/19 1124)   diltiazem 420 mg Oral Daily Margaret Arias MD    enoxaparin 40 mg Subcutaneous Q24H [address] Margaret Arias MD    gabapentin 100 mg Oral BID Mauro Duran MD    insulin glargine 40 Units Subcutaneous HS Mauro Duran MD    insulin lispro 1-5 Units Subcutaneous TID AC Mauro Duran MD    insulin lispro 1-5 Units Subcutaneous HS Mauro Duran MD    insulin lispro 10 Units Subcutaneous TID With Meals Mauro Duran MD    insulin regular 5 Units Subcutaneous Once Dorian Kee PA-C    losartan 100 mg Oral Daily Mauro Duran MD    morphine injection 4 mg Intravenous Q4H PRN Alina Alvarez DO    oxyCODONE 5 mg Oral Q6H PRN Mauro Duran MD    sodium chloride 125 mL/hr Intravenous Continuous Mauro Duran MD Last Rate: 125 mL/hr (09/17/19 9216)        Today, Patient Was Seen By: Malgorzata Wyatt MD    ** Please Note: Dictation voice to text software may have been used in the creation of this document   **

## 2019-09-18 LAB
ERYTHROCYTE [SEDIMENTATION RATE] IN BLOOD: 127 MM/HOUR (ref 0–20)
GLUCOSE SERPL-MCNC: 265 MG/DL (ref 65–140)
GLUCOSE SERPL-MCNC: 277 MG/DL (ref 65–140)
GLUCOSE SERPL-MCNC: 331 MG/DL (ref 65–140)
GLUCOSE SERPL-MCNC: 334 MG/DL (ref 65–140)
RHEUMATOID FACT SER QL LA: NEGATIVE

## 2019-09-18 PROCEDURE — 86038 ANTINUCLEAR ANTIBODIES: CPT | Performed by: FAMILY MEDICINE

## 2019-09-18 PROCEDURE — 86039 ANTINUCLEAR ANTIBODIES (ANA): CPT | Performed by: FAMILY MEDICINE

## 2019-09-18 PROCEDURE — 82948 REAGENT STRIP/BLOOD GLUCOSE: CPT

## 2019-09-18 PROCEDURE — 85652 RBC SED RATE AUTOMATED: CPT | Performed by: FAMILY MEDICINE

## 2019-09-18 PROCEDURE — G8987 SELF CARE CURRENT STATUS: HCPCS

## 2019-09-18 PROCEDURE — 99232 SBSQ HOSP IP/OBS MODERATE 35: CPT | Performed by: FAMILY MEDICINE

## 2019-09-18 PROCEDURE — 97167 OT EVAL HIGH COMPLEX 60 MIN: CPT

## 2019-09-18 PROCEDURE — G8988 SELF CARE GOAL STATUS: HCPCS

## 2019-09-18 PROCEDURE — 97535 SELF CARE MNGMENT TRAINING: CPT

## 2019-09-18 RX ORDER — INSULIN GLARGINE 100 [IU]/ML
45 INJECTION, SOLUTION SUBCUTANEOUS
Status: DISCONTINUED | OUTPATIENT
Start: 2019-09-18 | End: 2019-09-19

## 2019-09-18 RX ADMIN — ATORVASTATIN CALCIUM 40 MG: 40 TABLET, FILM COATED ORAL at 08:41

## 2019-09-18 RX ADMIN — SODIUM CHLORIDE 125 ML/HR: 0.9 INJECTION, SOLUTION INTRAVENOUS at 15:25

## 2019-09-18 RX ADMIN — CEFAZOLIN SODIUM 1000 MG: 1 SOLUTION INTRAVENOUS at 19:25

## 2019-09-18 RX ADMIN — SODIUM CHLORIDE 125 ML/HR: 0.9 INJECTION, SOLUTION INTRAVENOUS at 07:13

## 2019-09-18 RX ADMIN — INSULIN LISPRO 2 UNITS: 100 INJECTION, SOLUTION INTRAVENOUS; SUBCUTANEOUS at 11:36

## 2019-09-18 RX ADMIN — LOSARTAN POTASSIUM 100 MG: 50 TABLET ORAL at 08:41

## 2019-09-18 RX ADMIN — CEFAZOLIN SODIUM 1000 MG: 1 SOLUTION INTRAVENOUS at 03:55

## 2019-09-18 RX ADMIN — BUDESONIDE AND FORMOTEROL FUMARATE DIHYDRATE 2 PUFF: 80; 4.5 AEROSOL RESPIRATORY (INHALATION) at 19:21

## 2019-09-18 RX ADMIN — DILTIAZEM HYDROCHLORIDE 420 MG: 240 CAPSULE, EXTENDED RELEASE ORAL at 08:41

## 2019-09-18 RX ADMIN — INSULIN GLARGINE 45 UNITS: 100 INJECTION, SOLUTION SUBCUTANEOUS at 21:18

## 2019-09-18 RX ADMIN — ACETAMINOPHEN 650 MG: 325 TABLET ORAL at 19:43

## 2019-09-18 RX ADMIN — INSULIN LISPRO 4 UNITS: 100 INJECTION, SOLUTION INTRAVENOUS; SUBCUTANEOUS at 21:17

## 2019-09-18 RX ADMIN — GABAPENTIN 100 MG: 100 CAPSULE ORAL at 08:41

## 2019-09-18 RX ADMIN — ASPIRIN 81 MG 81 MG: 81 TABLET ORAL at 08:41

## 2019-09-18 RX ADMIN — ENOXAPARIN SODIUM 40 MG: 40 INJECTION SUBCUTANEOUS at 08:40

## 2019-09-18 RX ADMIN — GABAPENTIN 100 MG: 100 CAPSULE ORAL at 19:21

## 2019-09-18 RX ADMIN — INSULIN LISPRO 4 UNITS: 100 INJECTION, SOLUTION INTRAVENOUS; SUBCUTANEOUS at 16:25

## 2019-09-18 RX ADMIN — INSULIN LISPRO 3 UNITS: 100 INJECTION, SOLUTION INTRAVENOUS; SUBCUTANEOUS at 06:20

## 2019-09-18 RX ADMIN — BUDESONIDE AND FORMOTEROL FUMARATE DIHYDRATE 2 PUFF: 80; 4.5 AEROSOL RESPIRATORY (INHALATION) at 08:41

## 2019-09-18 RX ADMIN — CEFAZOLIN SODIUM 1000 MG: 1 SOLUTION INTRAVENOUS at 11:02

## 2019-09-18 NOTE — NURSING NOTE
Received pt this evening  Pt is AAO x4 and pt has  complaints of right UE pain at this time  Pt has +2 edema to right UE and has palpable pulses in all extremities  Pt lung sounds are clear and heart tones are regular  Bed is low and locked, call bell is in reach, will continue to monitor   Howie Madera RN

## 2019-09-18 NOTE — ASSESSMENT & PLAN NOTE
· Sounds like she has migraines , patient is c/o daily headaches since December  Patient is also complaining of blurry vision that has been also going since December  · Patient with elevated ESR but the blurry vision and the headache has been stable since December-and showed the patient needed to be started on steroids given the prolonged course of headache and visual changes    · Discussed with vascular surgery to see if she benefit from temporal artery biopsy  · Patient reported that she was supposed to see the Neurology as an outpatient due to headache and vision changes and weakness-Will obtain inpatient consultation

## 2019-09-18 NOTE — UTILIZATION REVIEW
Continued Stay Review    Date: 9/18                         Current Patient Class: INPATIENT  Current Level of Care: med/surg    HPI:58 y o  female initially admitted on 9/16 due to pain and swelling of RUE, prolonged QT, transaminitis  Assessment/Plan:   9/18 Pt with ongoing weakness of both upper extremities  No indication for surgical intervention  Neuro consult to find cause of weakness  PT/OT  Still with L hand edema and pain but ROM improving      Pertinent Labs/Diagnostic Results:   Results from last 7 days   Lab Units 09/17/19  0524 09/16/19  0945   WBC Thousand/uL 13 10* 14 70*   HEMOGLOBIN g/dL 8 7* 9 5*   HEMATOCRIT % 27 2* 29 2*   PLATELETS Thousands/uL 528* 520*   NEUTROS ABS Thousands/µL 9 70* 11 30*     Results from last 7 days   Lab Units 09/17/19  0524 09/16/19  0945   SODIUM mmol/L 135* 135*   POTASSIUM mmol/L 4 1 4 5   CHLORIDE mmol/L 95* 92*   CO2 mmol/L 33* 34*   ANION GAP mmol/L 7 9   BUN mg/dL 23 26*   CREATININE mg/dL 0 84 0 84   EGFR ml/min/1 73sq m 87 87   CALCIUM mg/dL 8 8 9 0     Results from last 7 days   Lab Units 09/17/19  0524 09/16/19  0945   AST U/L 45* 64*   ALT U/L 57* 59*   ALK PHOS U/L 291* 325*   TOTAL PROTEIN g/dL 7 1 8 0   ALBUMIN g/dL 3 5 3 8   TOTAL BILIRUBIN mg/dL 0 60 1 10     Results from last 7 days   Lab Units 09/18/19  1132 09/18/19  0458 09/17/19 2027 09/17/19  1638 09/17/19  1126 09/17/19  0548 09/17/19  0312 09/16/19  2319 09/16/19 2022   POC GLUCOSE mg/dl 265* 277* 386* 311* 212* 291* 307* 321* 359*     Results from last 7 days   Lab Units 09/17/19  0524 09/16/19  0945   GLUCOSE RANDOM mg/dL 312* 350*     Results from last 7 days   Lab Units 09/16/19  1713   CK TOTAL U/L 55     Results from last 7 days   Lab Units 09/16/19  0945   TROPONIN I ng/mL <0 01     Results from last 7 days   Lab Units 09/16/19  0945   TSH 3RD GENERATON uIU/mL 1 710     Results from last 7 days   Lab Units 09/16/19  0945   NT-PRO BNP pg/mL 136     Results from last 7 days   Lab Units 09/16/19  0945   FERRITIN ng/mL 560*     Results from last 7 days   Lab Units 09/16/19  1835   HEP B S AG  Non-reactive   HEP C AB  Equivocal*   HEP B C IGM  Non-reactive     Results from last 7 days   Lab Units 09/18/19  0539 09/16/19  0945   SED RATE mm/hour 127* >130*     Results from last 7 days   Lab Units 09/16/19  1201   CLARITY UA  Clear   COLOR UA  Cathi*   SPEC GRAV UA  1 015   PH UA  6 0   GLUCOSE UA mg/dl 250 (1/4%)*   KETONES UA mg/dl Negative   BLOOD UA  Negative   PROTEIN UA mg/dl 30 (1+)*   NITRITE UA  Negative   BILIRUBIN UA  Negative   UROBILINOGEN UA mg/dL 1 0   LEUKOCYTES UA  Negative   WBC UA /hpf 1-2*   RBC UA /hpf None Seen   BACTERIA UA /hpf Occasional   EPITHELIAL CELLS WET PREP /hpf Occasional     Results from last 7 days   Lab Units 09/16/19  1028 09/16/19  0945   BLOOD CULTURE  No Growth at 24 hrs  No Growth at 24 hrs      9/17 US gallbladder:  1   Hepatomegaly  2   Cholelithiasis      Vital Signs:   Date/Time Temp Pulse Resp BP SpO2 O2 Device   09/18/19 0704 98 8 °F (37 1 °C) 89 20 132/65 97 % None (Room air)   09/18/19 0046 101 7 °F (38 7 °C)Abnormal  95 18 149/69 98 % None (Room air)   09/17/19 1548 99 2 °F (37 3 °C) 87 20 129/60 97 % None (Room air)       Medications:   Scheduled Meds:   Current Facility-Administered Medications:  acetaminophen 650 mg Oral Q6H PRN   albuterol 2 puff Inhalation Q6H PRN   aspirin 81 mg Oral Daily   atorvastatin 40 mg Oral Daily   budesonide-formoterol 2 puff Inhalation BID   cefazolin 1,000 mg Intravenous Q8H   diltiazem 420 mg Oral Daily   enoxaparin 40 mg Subcutaneous Q24H RONI   gabapentin 100 mg Oral BID   insulin glargine 45 Units Subcutaneous HS   insulin lispro 1-5 Units Subcutaneous TID AC   insulin lispro 1-5 Units Subcutaneous HS   insulin lispro 15 Units Subcutaneous TID With Meals   insulin regular 5 Units Subcutaneous Once   losartan 100 mg Oral Daily   morphine injection 4 mg Intravenous Q4H PRN   oxyCODONE 5 mg Oral Q6H PRN   sodium chloride 125 mL/hr Intravenous Continuous       Discharge Plan: TBD    Network Utilization Review Department  Phone: [phone]; Fax [phone]  [email or web address]  ATTENTION: Please call with any questions or concerns to [phone]  and carefully listen to the prompts so that you are directed to the right person  Send all requests for admission clinical reviews, approved or denied determinations and any other requests to fax [phone]   All voicemails are confidential

## 2019-09-18 NOTE — PROGRESS NOTES
Progress Note - Abel Linn 1960, 59 y o  female MRN: [id number]    Unit/Bed#:  -01 Encounter: [id number]    Primary Care Provider: Jaye Pérez MD   Date and time admitted to hospital: 9/16/2019  9:32 AM        * Pain and swelling of right upper extremity  Assessment & Plan  · Progressively getting worse for past week- with weakness- she reports weakness in b/l upper extremities but right worse then left   Unknown etiology at this time   · She has wbc and thrombocytosis   · But cta of upper extremity is negative   · Upper extremity Doppler is negative for any acute dvt  · Patient with pain swelling tenderness and warm to palpation with leukocytosis and low-grade fever-concerning for cellulitis  Will start on antibiotics and monitor-appears to be improving  · Vascular surgery evaluation appreciated  Do not appear to have compartment syndrome  Plastic surgery consultation appreciated  Will empirically start on antibiotics and monitor  · Oxycodone for pain   · Esr -is above 130 which is way  higher than what is expected from an infection  · Uric acid -within normal limits  · Given high ESR Will obtain a rheumatology workup with rheumatoid factor factor is negative    Frequent headaches  Assessment & Plan  · Sounds like she has migraines , patient is c/o daily headaches since December  Patient is also complaining of blurry vision that has been also going since December  · Patient with elevated ESR but the blurry vision and the headache has been stable since December-and showed the patient needed to be started on steroids given the prolonged course of headache and visual changes    · Discussed with vascular surgery to see if she benefit from temporal artery biopsy  · Patient reported that she was supposed to see the Neurology as an outpatient due to headache and vision changes and weakness-Will obtain inpatient consultation    Mild intermittent asthma with exacerbation  Assessment & Plan  · Resume home medications of symbicort   · No acute exacerbation      Prolonged QT interval  Assessment & Plan  · qtc 487-   · Avoid QT prolonging medications    Hyperlipidemia  Assessment & Plan  · Resume statin   · LFTs improving  · Ultrasound reviewed    Anemia  Assessment & Plan  · Chronic microcytic but keeps dropping from previous - check iron panel and ferritin , fecal occult- no report of bleeding  · Hemoglobin down to 8 7  · Monitor    Hypertension  Assessment & Plan  · Blood pressure acceptable  Continue with the current care     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, with long-term current use of insulin [facility])  Assessment & Plan  Lab Results   Component Value Date    HGBA1C 14 6 (H) 08/22/2019       Recent Labs     09/17/19  2027 09/18/19  0458 09/18/19  1132 09/18/19  1536   POCGLU 386* 277* 265* 334*       Blood Sugar Average: Last 72 hrs:  · Adjust the dose of insulin and monitor        VTE Pharmacologic Prophylaxis:   Pharmacologic: Enoxaparin (Lovenox)  Mechanical VTE Prophylaxis in Place: Yes    Patient Centered Rounds: I have performed bedside rounds with nursing staff today  Discussions with Specialists or Other Care Team Provider:     Education and Discussions with Family / Patient:  Patient    Time Spent for Care: 30 minutes  More than 50% of total time spent on counseling and coordination of care as described above  Current Length of Stay: 2 day(s)    Current Patient Status: Inpatient   Certification Statement:  Patient need continued inpatient stay due to IV antibiotics  Discharge Plan:     Code Status: Level 1 - Full Code      Subjective:   Patient seen and examined  Reported that right upper extremity pain and swelling is improving able to move the hand better  Currently he is complaining of left upper extremity pain when she attributes to using the left upper extremity for everything    Patient also gives history of headache and blurry vision which is very weak on her examination since December of last year  Vision change has not    Objective:     Vitals:   Temp (24hrs), Av 6 °F (37 6 °C), Min:98 8 °F (37 1 °C), Max:101 7 °F (38 7 °C)    Temp:  [98 8 °F (37 1 °C)-101 7 °F (38 7 °C)] 99 1 °F (37 3 °C)  HR:  [84-95] 84  Resp:  [18-20] 20  BP: (132-149)/(65-69) 141/68  SpO2:  [97 %-98 %] 98 %  Body mass index is 45 86 kg/m²  Input and Output Summary (last 24 hours): Intake/Output Summary (Last 24 hours) at 2019 1739  Last data filed at 2019 1737  Gross per 24 hour   Intake 1607 ml   Output 600 ml   Net 1007 ml       Physical Exam:     Physical Exam   Constitutional: She appears well-developed  No distress  HENT:   Head: Normocephalic and atraumatic  Eyes: Right eye exhibits no discharge  Neck: No JVD present  Cardiovascular: Normal rate and regular rhythm  No murmur heard  Pulmonary/Chest: Effort normal  No stridor  No respiratory distress  Abdominal: Soft  She exhibits no mass  Musculoskeletal: Normal range of motion  She exhibits no edema  Right hand swelling and tenderness, warm to touch   Neurological: She is alert  No cranial nerve deficit  Skin: Skin is warm  Additional Data:     Labs:    Results from last 7 days   Lab Units 19  0524   WBC Thousand/uL 13 10*   HEMOGLOBIN g/dL 8 7*   HEMATOCRIT % 27 2*   PLATELETS Thousands/uL 528*   NEUTROS PCT % 74*   LYMPHS PCT % 14*   MONOS PCT % 7   EOS PCT % 4     Results from last 7 days   Lab Units 19  0524   POTASSIUM mmol/L 4 1   CHLORIDE mmol/L 95*   CO2 mmol/L 33*   BUN mg/dL 23   CREATININE mg/dL 0 84   CALCIUM mg/dL 8 8   ALK PHOS U/L 291*   ALT U/L 57*   AST U/L 45*           * I Have Reviewed All Lab Data Listed Above  * Additional Pertinent Lab Tests Reviewed:  All Labs For Current Hospital Admission Reviewed    Imaging:    Imaging Reports Reviewed Today Include:   Imaging Personally Reviewed by Myself Includes:      Recent Cultures (last 7 days):     Results from last 7 days   Lab Units 09/16/19  1028 09/16/19  0945   BLOOD CULTURE  No Growth at 48 hrs  No Growth at 48 hrs  Last 24 Hours Medication List:     Current Facility-Administered Medications:  acetaminophen 650 mg Oral Q6H PRN Inna Natanael, DO    albuterol 2 puff Inhalation Q6H PRN Shilpi Vieyra MD    aspirin 81 mg Oral Daily Shilpi Vieyra MD    atorvastatin 40 mg Oral Daily Shilpi Vieyra MD    budesonide-formoterol 2 puff Inhalation BID Shilpi Vieyra MD    cefazolin 1,000 mg Intravenous Q8H Kenneth Velasquez MD Last Rate: 1,000 mg (09/18/19 1102)   diltiazem 420 mg Oral Daily Shilpi Vieyra MD    enoxaparin 40 mg Subcutaneous Q24H [facility] Shilpi Vieyra MD    gabapentin 100 mg Oral BID Shilpi Vieyra MD    insulin glargine 45 Units Subcutaneous HS Kenneth Velasquez MD    insulin lispro 1-5 Units Subcutaneous TID AC Shilpi Vieyra MD    insulin lispro 1-5 Units Subcutaneous HS Shilpi Vieyra MD    insulin lispro 15 Units Subcutaneous TID With Meals Kenneth Velasquez MD    insulin regular 5 Units Subcutaneous Once Cornelio Bang ADAMS PA-C    losartan 100 mg Oral Daily Shilpi Vieyra MD    morphine injection 4 mg Intravenous Q4H PRN Inna Santoyo DO    oxyCODONE 5 mg Oral Q6H PRN Shilpi Vieyra MD    sodium chloride 125 mL/hr Intravenous Continuous Shilpi Vieyra MD Last Rate: 125 mL/hr (09/18/19 1525)        Today, Patient Was Seen By: Kenneth Velasquez MD    ** Please Note: Dictation voice to text software may have been used in the creation of this document   **

## 2019-09-18 NOTE — PLAN OF CARE
Problem: OCCUPATIONAL THERAPY ADULT  Goal: Performs self-care activities at highest level of function for planned discharge setting  See evaluation for individualized goals  Description  Treatment Interventions: ADL retraining, Functional transfer training, UE strengthening/ROM, Endurance training, Patient/family training, Equipment evaluation/education, Fine motor coordination activities, Compensatory technique education, Energy conservation, Activityengagement          See flowsheet documentation for full assessment, interventions and recommendations  Note:   Limitation: Decreased ADL status, Decreased Safe judgement during ADL, Decreased UE strength, Decreased UE ROM, Decreased sensation, Decreased endurance, Decreased self-care trans, Decreased fine motor control, Decreased high-level ADLs  Prognosis: Fair  Assessment: Pt is a 59 y o  female seen for OT evaluation s/p admit to [address] on 9/16/2019 w/ Pain and swelling of right upper extremity  Comorbidities affecting pt's functional performance at time of assessment include: uncontrolled type 2 diabetes, hypertension, anemia, hyperlipidemia, chronic headaches, obesity (please see extensive list of comorbidities)  Personal factors affecting pt at time of IE include:steps to enter environment, limited home support, behavioral pattern, difficulty performing ADLS, difficulty performing IADLS , level of education, limited insight into deficits, flat affect, decreased initiation and engagement , financial barriers, health management  and environment  Prior to admission and her onset of b/l hand swelling, pt was indep with functional mobility using a spc prn, ADLs and IADLs  With onset of b/l hand swelling and pain, patient reports continuing to be independent as her spouse and son she lives with cannot assist her  Upon evaluation: Pt requires min A for UB ADLs with increased time and effort 2' pain, functional mobility and transfers with RW or pushing IV pole  Patient requires max A for LB ADLs  Patient presents with the following deficits impacting occupational performance: weakness, decreased ROM, decreased strength, decreased balance, decreased tolerance, impaired [address], impaired sensation, impaired attention, impaired initiation, impaired sequencing, impaired problem solving, decreased safety awareness, increased pain, orthopedic restrictions, impaired interpersonal skills and decreased coping skills  Pt to benefit from continued skilled OT tx while in the hospital to address deficits as defined above and maximize level of functional independence w ADL's and functional mobility  Occupational Performance areas to address include: eating, grooming, bathing/shower, toilet hygiene, dressing, medication management, socialization, health maintenance, functional mobility, community mobility, clothing management, cleaning, meal prep, money management, household maintenance, job performance/volunteering and social participation  Based on findings, patient is of high complexity  From OT standpoint, recommendation at time of d/c would be short term rehab         OT Discharge Recommendation: Short Term Rehab  OT - OK to Discharge: Yes(to rehab when medically stable)

## 2019-09-18 NOTE — OCCUPATIONAL THERAPY NOTE
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, with long-term current use of insulin ([facility])    Hypertension    Anemia    Hyperlipidemia    Prolonged QT interval    Mild intermittent asthma with exacerbation    Frequent headaches    Transaminitis    Past Medical History  Past Medical History:   Diagnosis Date    Anemia     Arthritis     Diabetes mellitus ([facility] )     Dyspnea on exertion     Hyperlipidemia     Hypertension     Legally blind 2010    Mild intermittent asthma with exacerbation 8/4/2018    Miscarriage     x 3    Seizures ([facility])     Sickle cell trait ([facility] )     Sleep apnea      Past Surgical History  Past Surgical History:   Procedure Laterality Date    CATARACT EXTRACTION Bilateral     august and september    EYE SURGERY      HYSTERECTOMY      39    OOPHORECTOMY Left     37     Time: 3081-1924 09/18/19 1133   Note Type   Note type Eval/Treat   Restrictions/Precautions   Weight Bearing Precautions Per Order   ((NOT SPECIFIED; PATIENT DOING PWB R HAND AND PWB L HAND ))   Braces or Orthoses   (none)   Other Precautions Multiple lines; Fall Risk;Pain   Pain Assessment   Pain Assessment 0-10   Pain Score 6   Pain Type Chronic pain   Pain Location Hand;Arm   Pain Orientation Bilateral   Pain Descriptors Aching   Pain Frequency Intermittent   Pain Onset Ongoing   Clinical Progression Not changed   Effect of Pain on Daily Activities adls, mobiltiy   Patient's Stated Pain Goal No pain   Hospital Pain Intervention(s) Repositioned; Ambulation/increased activity   Response to Interventions tolerated   Home Living   Type of [address] One level;Performs ADLs on one level; Able to live on main level with bedroom/bathroom;Stairs to enter with rails  (3STE R asc HR; 6+6 steps to apt R asc)   Bathroom Shower/Tub Tub/shower unit   Bathroom Toilet Standard   Bathroom Equipment Shower chair;Hand-held shower   [address] [facility] Cane;Walker   Prior Function   Level of [place] Needs assistance with ADLs and functional mobility; Needs assistance with IADLs   Lives With Spouse; Son   [employer] Help From   (family unable to A)   ADL Assistance Needs assistance   IADLs Needs assistance   Falls in the last 6 months 0   Vocational On disability   Comments spouse and son unable to assist at DC   (-) driving   Psychosocial   Psychosocial (WDL) WDL   Subjective   Subjective it hurts to move   ADL   Eating Assistance 2  Maximal Assistance   Grooming Assistance 4  Minimal Assistance   [address] 4  Minimal Assistance   LB Bathing Assistance 2  Maximal Assistance   UB Dressing Assistance 4  Minimal Assistance   LB Dressing Assistance 2  Maximal [address]  3  Moderate Assistance   Bed Mobility   Additional Comments oob on arrival therefore did not assess   Transfers   Sit to Stand 4  Minimal assistance   Stand to Sit 4  Minimal assistance   Stand pivot 4  Minimal assistance   Toilet transfer 3  Moderate assistance  (lower surface)   Functional Mobility   Functional Mobility 4  Minimal assistance   Additional Comments increased time   Additional items Rolling walker  (or pushing IV Pole)   Balance   Static Sitting Fair +   Dynamic Sitting Fair   Static Standing Fair -   Dynamic Standing Fair -   Ambulatory Fair -   Activity Tolerance   Activity Tolerance Patient limited by fatigue;Patient limited by pain   Medical Staff Made Aware yes   Nurse Made Aware ok to see   RUE Assessment   RUE Assessment X  (sh AROM FF/ABD ~90*)   LUE Assessment   LUE Assessment X  (sh AROM FF/ABD ~90*)   Hand Function   Gross Motor Coordination Functional   Fine Motor Coordination Functional   Sensation   Light Touch Partial deficits in the RUE;Partial deficits in the LUE  (L > R)   Sharp/Dull Partial deficits in the RUE;Partial deficits in the LUE  (L > R)   Vision-Basic Assessment   Current Vision Wears glasses only for reading   Cognition   Overall Cognitive Status [facility]   Arousal/Participation Alert; Responsive; Cooperative   Attention Attends with cues to redirect   Orientation Level Oriented X4   Memory Within functional limits   Following Commands Follows multistep commands without difficulty   Assessment   Limitation Decreased ADL status; Decreased Safe judgement during ADL;Decreased UE strength;Decreased UE ROM; Decreased sensation;Decreased endurance;Decreased self-care trans;Decreased fine motor control;Decreased high-level ADLs   Prognosis Fair   Assessment Pt is a 59 y o  female seen for OT evaluation s/p admit to [facility] on 9/16/2019 w/ Pain and swelling of right upper extremity  Comorbidities affecting pt's functional performance at time of assessment include: uncontrolled type 2 diabetes, hypertension, anemia, hyperlipidemia, chronic headaches, obesity (please see extensive list of comorbidities)  Personal factors affecting pt at time of IE include:steps to enter environment, limited home support, behavioral pattern, difficulty performing ADLS, difficulty performing IADLS , level of education, limited insight into deficits, flat affect, decreased initiation and engagement , financial barriers, health management  and environment  Prior to admission and her onset of b/l hand swelling, pt was indep with functional mobility using a spc prn, ADLs and IADLs  With onset of b/l hand swelling and pain, patient reports continuing to be independent as her spouse and son she lives with cannot assist her  Upon evaluation: Pt requires min A for UB ADLs with increased time and effort 2' pain, functional mobility and transfers with RW or pushing IV pole  Patient requires max A for LB ADLs   Patient presents with the following deficits impacting occupational performance: weakness, decreased ROM, decreased strength, decreased balance, decreased tolerance, impaired FMC, impaired sensation, impaired attention, impaired initiation, impaired sequencing, impaired problem solving, decreased safety awareness, increased pain, orthopedic restrictions, impaired interpersonal skills and decreased coping skills  Pt to benefit from continued skilled OT tx while in the hospital to address deficits as defined above and maximize level of functional independence w ADL's and functional mobility  Occupational Performance areas to address include: eating, grooming, bathing/shower, toilet hygiene, dressing, medication management, socialization, health maintenance, functional mobility, community mobility, clothing management, cleaning, meal prep, money management, household maintenance, job performance/volunteering and social participation  Based on findings, patient is of high complexity  From OT standpoint, recommendation at time of d/c would be short term rehab  Goals   Patient Goals to get better   Long Term Goal see below   Plan   Treatment Interventions ADL retraining;Functional transfer training;UE strengthening/ROM; Endurance training;Patient/family training;Equipment evaluation/education; Fine motor coordination activities; Compensatory technique education; Energy conservation; Activityengagement   Goal Expiration Date 09/28/19   Treatment Day 1   OT Frequency 3-5x/wk   Additional Treatment Session   Start Time 1120   End Time 1133   Treatment Assessment emphasis on adl retraining, endurance, strengthening, ROM   please see assessment and flowsheet for further details   Recommendation   OT Discharge Recommendation Short Term Rehab   OT - OK to Discharge Yes  (to rehab when medically stable)   Barthel Index   Feeding 5   Bathing 0   Grooming Score 0   Dressing Score 5   Bladder Score 10   Bowels Score 10   Toilet Use Score 5   Transfers (Bed/Chair) Score 5   Mobility (Level Surface) Score 0   Stairs Score 0 Barthel Index Score 40       GOALS    1) Pt will improve activity tolerance to G for min 30 min txment sessions    2) Pt will complete UB/LB dressing/self care w/ mod I using adaptive device and DME as needed    3) Pt will complete bathing w/ Mod I w/ use of AE and DME as needed    4) Pt will complete toileting w/ mod I w/ G hygiene/thoroughness using DME as needed    5) Pt will improve functional transfers to Mod I on/off all surfaces using DME as needed w/ G balance/safety     6) Pt will improve functional mobility during ADL/IADL/leisure tasks to Mod I using DME as needed w/ G balance/safety     7) Pt will participate in simulated IADL management task to increase independence to Mod I w/ G safety and endurance    8) Pt will demonstrate G attention for 10 minutes during ongoing cognitive assessment to assist w/ safe d/c planning/recommendations    9) Pt will demonstrate G carryover of pt/caregiver education and training as appropriate w/ mod I w/o cues w/ good tolerance    10) Pt will demonstrate 100% carryover of energy conservation techniques w/ mod I t/o functional I/ADL/leisure tasks w/o cues s/p skilled education        Kisha Bowers, MS, OTR/L

## 2019-09-18 NOTE — SOCIAL WORK
LOS: 2 GMLOS: 3 3    Per Attending pt is not medically cleared for discharge today due to spiking temp of 101 7 last night  TCF  made aware

## 2019-09-18 NOTE — PLAN OF CARE
Problem: PAIN - ADULT  Goal: Verbalizes/displays adequate comfort level or baseline comfort level  Description  Interventions:  - Encourage patient to monitor pain and request assistance  - Assess pain using appropriate pain scale  - Administer analgesics based on type and severity of pain and evaluate response  - Implement non-pharmacological measures as appropriate and evaluate response  - Consider cultural and social influences on pain and pain management  - Notify physician/advanced practitioner if interventions unsuccessful or patient reports new pain  Outcome: Progressing     Problem: INFECTION - ADULT  Goal: Absence or prevention of progression during hospitalization  Description  INTERVENTIONS:  - Assess and monitor for signs and symptoms of infection  - Monitor lab/diagnostic results  - Monitor all insertion sites, i e  indwelling lines, tubes, and drains  - Monitor endotracheal if appropriate and nasal secretions for changes in amount and color  - [place] appropriate cooling/warming therapies per order  - Administer medications as ordered  - Instruct and encourage patient and family to use good hand hygiene technique  - Identify and instruct in appropriate isolation precautions for identified infection/condition  Outcome: Progressing     Problem: SAFETY ADULT  Goal: Patient will remain free of falls  Description  INTERVENTIONS:  - Assess patient frequently for physical needs  -  Identify cognitive and physical deficits and behaviors that affect risk of falls    -  [place] fall precautions as indicated by assessment   - Educate patient/family on patient safety including physical limitations  - Instruct patient to call for assistance with activity based on assessment  - Modify environment to reduce risk of injury  - Consider OT/PT consult to assist with strengthening/mobility  Outcome: Progressing  Goal: Maintain or return to baseline ADL function  Description  INTERVENTIONS:  -  Assess patient's ability to carry out ADLs; assess patient's baseline for ADL function and identify physical deficits which impact ability to perform ADLs (bathing, care of mouth/teeth, toileting, grooming, dressing, etc )  - Assess/evaluate cause of self-care deficits   - Assess range of motion  - Assess patient's mobility; develop plan if impaired  - Assess patient's need for assistive devices and provide as appropriate  - Encourage maximum independence but intervene and supervise when necessary  - Involve family in performance of ADLs  - Assess for home care needs following discharge   - Consider OT consult to assist with ADL evaluation and planning for discharge  - Provide patient education as appropriate  Outcome: Progressing  Goal: Maintain or return mobility status to optimal level  Description  INTERVENTIONS:  - Assess patient's baseline mobility status (ambulation, transfers, stairs, etc )    - Identify cognitive and physical deficits and behaviors that affect mobility  - Identify mobility aids required to assist with transfers and/or ambulation (gait belt, sit-to-stand, lift, walker, cane, etc )  - [place] fall precautions as indicated by assessment  - Record patient progress and toleration of activity level on Mobility SBAR; progress patient to next Phase/Stage  - Instruct patient to call for assistance with activity based on assessment  - Consider rehabilitation consult to assist with strengthening/weightbearing, etc   Outcome: Progressing     Problem: DISCHARGE PLANNING  Goal: Discharge to home or other facility with appropriate resources  Description  INTERVENTIONS:  - Identify barriers to discharge w/patient and caregiver  - Arrange for needed discharge resources and transportation as appropriate  - Identify discharge learning needs (meds, wound care, etc )  - Arrange for interpretive services to assist at discharge as needed  - Refer to Case Management Department for coordinating discharge planning if the patient needs post-hospital services based on physician/advanced practitioner order or complex needs related to functional status, cognitive ability, or social support system  Outcome: Progressing     Problem: Knowledge Deficit  Goal: Patient/family/caregiver demonstrates understanding of disease process, treatment plan, medications, and discharge instructions  Description  Complete learning assessment and assess knowledge base  Interventions:  - Provide teaching at level of understanding  - Provide teaching via preferred learning methods  Outcome: Progressing     Problem: DISCHARGE PLANNING - CARE MANAGEMENT  Goal: Discharge to post-acute care or home with appropriate resources  Description  INTERVENTIONS:  - Conduct assessment to determine patient/family and health care team treatment goals, and need for post-acute services based on payer coverage, community resources, and patient preferences, and barriers to discharge  - Address psychosocial, clinical, and financial barriers to discharge as identified in assessment in conjunction with the patient/family and health care team  - Arrange appropriate level of post-acute services according to patients   needs and preference and payer coverage in collaboration with the physician and health care team  - Communicate with and update the patient/family, physician, and health care team regarding progress on the discharge plan  - Arrange appropriate transportation to post-acute venues  Outcome: Progressing     Problem: Potential for Falls  Goal: Patient will remain free of falls  Description  INTERVENTIONS:  - Assess patient frequently for physical needs  -  Identify cognitive and physical deficits and behaviors that affect risk of falls    -  [place] fall precautions as indicated by assessment   - Educate patient/family on patient safety including physical limitations  - Instruct patient to call for assistance with activity based on assessment  - Modify environment to reduce risk of injury  - Consider OT/PT consult to assist with strengthening/mobility  Outcome: Progressing

## 2019-09-18 NOTE — PROGRESS NOTES
Patient moves wrist and fingers better  Plastic surgery consult appreciated  Plan:  Physical therapy

## 2019-09-18 NOTE — NURSING NOTE
On initial rounds patient in no apparent distress  Skin warm and dry to touch  Pleasant and cooperative  Right hand remain wrap encourage to wriggle fingers they are warm to touch  Today patient is complaining of pain to left hand same has trace of edema  To;lerating diet  All safety maintained  Will continue to monitor

## 2019-09-18 NOTE — ASSESSMENT & PLAN NOTE
· Progressively getting worse for past week- with weakness- she reports weakness in b/l upper extremities but right worse then left   Unknown etiology at this time   · She has wbc and thrombocytosis   · But cta of upper extremity is negative   · Upper extremity Doppler is negative for any acute dvt  · Patient with pain swelling tenderness and warm to palpation with leukocytosis and low-grade fever-concerning for cellulitis  Will start on antibiotics and monitor-appears to be improving  · Vascular surgery evaluation appreciated  Do not appear to have compartment syndrome  Plastic surgery consultation appreciated  Will empirically start on antibiotics and monitor    · Oxycodone for pain   · Esr -is above 130 which is way  higher than what is expected from an infection  · Uric acid -within normal limits  · Given high ESR Will obtain a rheumatology workup with rheumatoid factor factor is negative

## 2019-09-18 NOTE — ASSESSMENT & PLAN NOTE
Lab Results   Component Value Date    HGBA1C 14 6 (H) 08/22/2019       Recent Labs     09/17/19  2027 09/18/19  0458 09/18/19  1132 09/18/19  1536   POCGLU 386* 277* 265* 334*       Blood Sugar Average: Last 72 hrs:  · Adjust the dose of insulin and monitor

## 2019-09-19 PROBLEM — E66.01 MORBID OBESITY (HCC): Status: ACTIVE | Noted: 2019-09-19

## 2019-09-19 LAB
ALBUMIN SERPL BCP-MCNC: 3.3 G/DL (ref 3–5.2)
ALP SERPL-CCNC: 306 U/L (ref 43–122)
ALT SERPL W P-5'-P-CCNC: 50 U/L (ref 9–52)
ANION GAP SERPL CALCULATED.3IONS-SCNC: 5 MMOL/L (ref 5–14)
ANISOCYTOSIS BLD QL SMEAR: PRESENT
AST SERPL W P-5'-P-CCNC: 36 U/L (ref 14–36)
BILIRUB SERPL-MCNC: 0.6 MG/DL
BUN SERPL-MCNC: 12 MG/DL (ref 5–25)
CALCIUM SERPL-MCNC: 8.6 MG/DL (ref 8.4–10.2)
CHLORIDE SERPL-SCNC: 99 MMOL/L (ref 97–108)
CO2 SERPL-SCNC: 29 MMOL/L (ref 22–30)
CREAT SERPL-MCNC: 0.69 MG/DL (ref 0.6–1.2)
CRP SERPL QL: >90 MG/L
EOSINOPHIL # BLD AUTO: 0.31 THOUSAND/UL (ref 0–0.4)
EOSINOPHIL NFR BLD MANUAL: 2 % (ref 0–6)
ERYTHROCYTE [DISTWIDTH] IN BLOOD BY AUTOMATED COUNT: 15.6 %
GFR SERPL CREATININE-BSD FRML MDRD: 109 ML/MIN/1.73SQ M
GLUCOSE SERPL-MCNC: 226 MG/DL (ref 65–140)
GLUCOSE SERPL-MCNC: 273 MG/DL (ref 65–140)
GLUCOSE SERPL-MCNC: 276 MG/DL (ref 70–99)
GLUCOSE SERPL-MCNC: 294 MG/DL (ref 65–140)
GLUCOSE SERPL-MCNC: 350 MG/DL (ref 65–140)
HCT VFR BLD AUTO: 26.8 % (ref 36–46)
HGB BLD-MCNC: 8.5 G/DL (ref 12–16)
HYPERCHROMIA BLD QL SMEAR: PRESENT
LYMPHOCYTES # BLD AUTO: 16 % (ref 25–45)
LYMPHOCYTES # BLD AUTO: 2.48 THOUSAND/UL (ref 0.5–4)
MCH RBC QN AUTO: 24.2 PG (ref 26–34)
MCHC RBC AUTO-ENTMCNC: 31.8 G/DL (ref 31–36)
MCV RBC AUTO: 76 FL (ref 80–100)
MICROCYTES BLD QL AUTO: PRESENT
MONOCYTES # BLD AUTO: 1.55 THOUSAND/UL (ref 0.2–0.9)
MONOCYTES NFR BLD AUTO: 10 % (ref 1–10)
NEUTS SEG # BLD: 11.16 THOUSAND/UL (ref 1.8–7.8)
NEUTS SEG NFR BLD AUTO: 72 %
PLATELET # BLD AUTO: 598 THOUSANDS/UL (ref 150–450)
PLATELET BLD QL SMEAR: ABNORMAL
PMV BLD AUTO: 7.8 FL (ref 8.9–12.7)
POTASSIUM SERPL-SCNC: 4.5 MMOL/L (ref 3.6–5)
PROT SERPL-MCNC: 7 G/DL (ref 5.9–8.4)
RBC # BLD AUTO: 3.52 MILLION/UL (ref 4–5.2)
RBC MORPH BLD: PRESENT
SODIUM SERPL-SCNC: 133 MMOL/L (ref 137–147)
TOTAL CELLS COUNTED SPEC: 100
WBC # BLD AUTO: 15.5 THOUSAND/UL (ref 4.5–11)

## 2019-09-19 PROCEDURE — 85732 THROMBOPLASTIN TIME PARTIAL: CPT | Performed by: FAMILY MEDICINE

## 2019-09-19 PROCEDURE — 99232 SBSQ HOSP IP/OBS MODERATE 35: CPT | Performed by: FAMILY MEDICINE

## 2019-09-19 PROCEDURE — 97530 THERAPEUTIC ACTIVITIES: CPT

## 2019-09-19 PROCEDURE — 86147 CARDIOLIPIN ANTIBODY EA IG: CPT | Performed by: FAMILY MEDICINE

## 2019-09-19 PROCEDURE — 86140 C-REACTIVE PROTEIN: CPT | Performed by: FAMILY MEDICINE

## 2019-09-19 PROCEDURE — 82948 REAGENT STRIP/BLOOD GLUCOSE: CPT

## 2019-09-19 PROCEDURE — 86255 FLUORESCENT ANTIBODY SCREEN: CPT | Performed by: FAMILY MEDICINE

## 2019-09-19 PROCEDURE — 85670 THROMBIN TIME PLASMA: CPT | Performed by: FAMILY MEDICINE

## 2019-09-19 PROCEDURE — 85705 THROMBOPLASTIN INHIBITION: CPT | Performed by: FAMILY MEDICINE

## 2019-09-19 PROCEDURE — 86200 CCP ANTIBODY: CPT | Performed by: FAMILY MEDICINE

## 2019-09-19 PROCEDURE — 85613 RUSSELL VIPER VENOM DILUTED: CPT | Performed by: FAMILY MEDICINE

## 2019-09-19 PROCEDURE — 85027 COMPLETE CBC AUTOMATED: CPT | Performed by: FAMILY MEDICINE

## 2019-09-19 PROCEDURE — 85007 BL SMEAR W/DIFF WBC COUNT: CPT | Performed by: FAMILY MEDICINE

## 2019-09-19 PROCEDURE — 87040 BLOOD CULTURE FOR BACTERIA: CPT | Performed by: FAMILY MEDICINE

## 2019-09-19 PROCEDURE — 87476 LYME DIS DNA AMP PROBE: CPT | Performed by: FAMILY MEDICINE

## 2019-09-19 PROCEDURE — 80053 COMPREHEN METABOLIC PANEL: CPT | Performed by: FAMILY MEDICINE

## 2019-09-19 RX ORDER — INSULIN GLARGINE 100 [IU]/ML
50 INJECTION, SOLUTION SUBCUTANEOUS
Status: DISCONTINUED | OUTPATIENT
Start: 2019-09-19 | End: 2019-09-19

## 2019-09-19 RX ORDER — INSULIN GLARGINE 100 [IU]/ML
35 INJECTION, SOLUTION SUBCUTANEOUS EVERY 12 HOURS SCHEDULED
Status: DISCONTINUED | OUTPATIENT
Start: 2019-09-19 | End: 2019-09-20

## 2019-09-19 RX ADMIN — INSULIN LISPRO 10 UNITS: 100 INJECTION, SOLUTION INTRAVENOUS; SUBCUTANEOUS at 16:34

## 2019-09-19 RX ADMIN — INSULIN GLARGINE 35 UNITS: 100 INJECTION, SOLUTION SUBCUTANEOUS at 11:59

## 2019-09-19 RX ADMIN — LOSARTAN POTASSIUM 100 MG: 50 TABLET ORAL at 08:40

## 2019-09-19 RX ADMIN — SODIUM CHLORIDE 125 ML/HR: 0.9 INJECTION, SOLUTION INTRAVENOUS at 08:38

## 2019-09-19 RX ADMIN — CEFAZOLIN SODIUM 1000 MG: 1 SOLUTION INTRAVENOUS at 03:43

## 2019-09-19 RX ADMIN — SODIUM CHLORIDE 125 ML/HR: 0.9 INJECTION, SOLUTION INTRAVENOUS at 16:44

## 2019-09-19 RX ADMIN — INSULIN LISPRO 2 UNITS: 100 INJECTION, SOLUTION INTRAVENOUS; SUBCUTANEOUS at 21:54

## 2019-09-19 RX ADMIN — ASPIRIN 81 MG 81 MG: 81 TABLET ORAL at 08:40

## 2019-09-19 RX ADMIN — ENOXAPARIN SODIUM 40 MG: 40 INJECTION SUBCUTANEOUS at 08:39

## 2019-09-19 RX ADMIN — INSULIN GLARGINE 35 UNITS: 100 INJECTION, SOLUTION SUBCUTANEOUS at 21:55

## 2019-09-19 RX ADMIN — GABAPENTIN 100 MG: 100 CAPSULE ORAL at 08:40

## 2019-09-19 RX ADMIN — GABAPENTIN 100 MG: 100 CAPSULE ORAL at 17:40

## 2019-09-19 RX ADMIN — INSULIN LISPRO 3 UNITS: 100 INJECTION, SOLUTION INTRAVENOUS; SUBCUTANEOUS at 06:30

## 2019-09-19 RX ADMIN — ATORVASTATIN CALCIUM 40 MG: 40 TABLET, FILM COATED ORAL at 08:40

## 2019-09-19 RX ADMIN — DILTIAZEM HYDROCHLORIDE 420 MG: 240 CAPSULE, EXTENDED RELEASE ORAL at 08:40

## 2019-09-19 RX ADMIN — BUDESONIDE AND FORMOTEROL FUMARATE DIHYDRATE 2 PUFF: 80; 4.5 AEROSOL RESPIRATORY (INHALATION) at 17:40

## 2019-09-19 RX ADMIN — BUDESONIDE AND FORMOTEROL FUMARATE DIHYDRATE 2 PUFF: 80; 4.5 AEROSOL RESPIRATORY (INHALATION) at 08:43

## 2019-09-19 NOTE — PLAN OF CARE
Problem: PHYSICAL THERAPY ADULT  Goal: Performs mobility at highest level of function for planned discharge setting  See evaluation for individualized goals  Description  Treatment/Interventions: Functional transfer training, LE strengthening/ROM, Elevations, Therapeutic exercise, Endurance training, Patient/family training, Equipment eval/education, Bed mobility, Gait training, OT, Spoke to nursing  Equipment Recommended: Other (Comment), Cane, Walker(RECOMMEND USE OF RW IF EDEMA IN R UE IMPROVES )       See flowsheet documentation for full assessment, interventions and recommendations  Outcome: Progressing  Note:   Prognosis: Good  Problem List: Decreased strength, Decreased range of motion, Decreased endurance, Impaired balance, Decreased mobility, Decreased coordination, Pain, Obesity  Assessment: Pt seen for split session due to lunch tray arrival   Pt seen for application of tubigrip C to both hands/wrist with thumb cut out  Trial of compression glove unsuccessful as it was to small  Larger size not available at this time  Pt expresses that hands feel somewhat better with some compression in place  Tubigrip extends ~ 2 " above the wrist   Pt demonstrated ability to transfer with minimal use of left hand due to wrist pain to standing  She is able to ambulate without assistive device  She reports not c/o pain in legs  Her gait appears antalgic, excessively slow and waddling which may be due to her size  No LOB or buckling occurs for distance reported  She required assistance with hygiene following use of BSC  Due to limited use of her hands due to swelling in both fingers, hands and wrists  Pt will benefit from ongoing PT to maximize function and balance per established goals  Recommendation: Short-term skilled PT     PT - OK to Discharge: Yes    See flowsheet documentation for full assessment

## 2019-09-19 NOTE — ASSESSMENT & PLAN NOTE
Lab Results   Component Value Date    HGBA1C 14 6 (H) 08/22/2019       Recent Labs     09/18/19  2006 09/19/19  0550 09/19/19  1102 09/19/19  1555   POCGLU 331* 273* 294* 350*       Blood Sugar Average: Last 72 hrs:  · Patient seen and examined  Reported that she uses 40 units of Lantus b i d  And 30-35 units of Humalog with meals    · Will adjust the insulin and monitor

## 2019-09-19 NOTE — ASSESSMENT & PLAN NOTE
· Sounds like she has migraines , patient is c/o daily headaches since December  Patient is also complaining of blurry vision that has been also going since December  · Patient with elevated ESR but the blurry vision and the headache has been stable since December-and showed the patient needed to be started on steroids given the prolonged course of headache and visual changes    · Discussed with vascular surgery to see if she benefit from temporal artery biopsy  · Patient reported that she was supposed to see the Neurology as an outpatient due to headache and vision changes and weakness-neurology consultation pending  · Patient reported that today she does not have any pain

## 2019-09-19 NOTE — PHYSICAL THERAPY NOTE
Physical Therapy treatment note    Time in/out 5239-7387; 3224-4969       09/19/19 1415   Pain Assessment   Pain Assessment 0-10   Pain Score 4   Pain Type Acute pain   Pain Location Hand   Pain Orientation Right;Left   Pain Descriptors Aching   Patient's Stated Pain Goal No pain   Restrictions/Precautions   Weight Bearing Precautions Per Order No   Other Precautions Multiple lines; Fall Risk;Pain   General   Chart Reviewed Yes   Family/Caregiver Present No   Cognition   Attention Within functional limits   Orientation Level Oriented X4   Subjective   Subjective My left hand hurts today and is more swollen than the right  Transfers   Sit to Stand   (CGA)   Stand to Sit   (CGA)   Stand pivot   (CGA)   Toilet transfer 4  Minimal assistance   Additional items   (dependent for hygiene)   Ambulation/Elevation   Gait pattern Excessively slow; Improper Weight shift   Gait Assistance   (CGA)   Additional items Assist x 1   Assistive Device None   Distance 50 feet   Activity Tolerance   Activity Tolerance Patient tolerated treatment well   Medical Staff Made Aware   (Dr Se Hess request for edema mgt to both hands)   Nurse Made Aware RN clears to see   Assessment   Prognosis Good   Problem List Decreased strength;Decreased range of motion;Decreased endurance; Impaired balance;Decreased mobility; Decreased coordination;Pain;Obesity   Assessment Pt seen for split session due to lunch tray arrival   Pt seen for application of tubigrip C to both hands/wrist with thumb cut out  Trial of compression glove unsuccessful as it was to small  Larger size not available at this time  Pt expresses that hands feel somewhat better with some compression in place  Tubigrip extends ~ 2 " above the wrist   Pt demonstrated ability to transfer with minimal use of left hand due to wrist pain to standing  She is able to ambulate without assistive device  She reports not c/o pain in legs    Her gait appears antalgic, excessively slow and waddling which may be due to her size  No LOB or buckling occurs for distance reported  She required assistance with hygiene following use of BSC  Due to limited use of her hands due to swelling in both fingers, hands and wrists  Pt will benefit from ongoing PT to maximize function and balance per established goals  Goals   Patient Goals to get better   LTG Expiration Date 10/01/19   Treatment Day 1   Plan   Treatment/Interventions Functional transfer training; Therapeutic exercise;Patient/family training;Gait training;Spoke to nursing;Spoke to MD  ([place] )   Progress Progressing toward goals   Recommendation   Recommendation Short-term skilled PT   Equipment Recommended   (TBD in rehab)   PT - OK to Discharge Yes   Additional Comments   (if medically stable and going to rehab)       Juana Alexander, PT

## 2019-09-19 NOTE — NURSING NOTE
On initial rounds patient in no apparent distress  Ski warm and dry to touch  Pleasant and cooperative  Continue to have swellings of both hands with pain  Ambulates well by self  Tolerating diet  Voids adequate amount of urine  No s/s of hypo/hyperglycemic reaction noted none voiced  All safety maintained  Will continue to monitor

## 2019-09-19 NOTE — PROGRESS NOTES
Progress Note - Kaitlynn Hampton 1960, 59 y o  female MRN: [id number]    Unit/Bed#: 5T -01 Encounter: [id number]    Primary Care Provider: Vinnie Banks MD   Date and time admitted to hospital: 9/16/2019  9:32 AM        * Pain and swelling of right upper extremity  Assessment & Plan  · Progressively getting worse for past week- with weakness- she reports weakness in b/l upper extremities but right worse then left   Unknown etiology at this time   · She has wbc and thrombocytosis   · CT of the upper extremity is negative for any acute pathology  · Upper extremity Doppler is negative for any acute dvt  · Patient was initially started on antibiotics but since she developed similar symptoms on the other side antibiotics were discontinued likely Rheumatology in nature  Rheumatology workup in progress  · Vascular surgery evaluation appreciated  Do not appear to have compartment syndrome  Plastic surgery consultation appreciated  · Oxycodone for pain   · Esr -is above 130 which is way  higher than what is expected from an infection  · Uric acid -within normal limits  · Given high ESR Will obtain a rheumatology workup with rheumatoid factor factor is negative  · Will order further rheumatology workup at this point  · Patient denies any recent sexual exposure or any GI symptoms  · Need to discuss with vascular surgery about the possibility of obtaining a temporal artery biopsy    Frequent headaches  Assessment & Plan  · Sounds like she has migraines , patient is c/o daily headaches since December  Patient is also complaining of blurry vision that has been also going since December  · Patient with elevated ESR but the blurry vision and the headache has been stable since December-and showed the patient needed to be started on steroids given the prolonged course of headache and visual changes    · Discussed with vascular surgery to see if she benefit from temporal artery biopsy  · Patient reported that she was supposed to see the Neurology as an outpatient due to headache and vision changes and weakness-neurology consultation pending  · Patient reported that today she does not have any pain    Mild intermittent asthma with exacerbation  Assessment & Plan  · Resume home medications of symbicort   · No acute exacerbation      Prolonged QT interval  Assessment & Plan  · qtc 487-   · Avoid QT prolonging medications    Hyperlipidemia  Assessment & Plan  · Resume statin   · Transaminitis improved, alkaline phosphatase is up trending  · Monitor  · Obtain hepatitis panel tomorrow  · Ultrasound reviewed    Anemia  Assessment & Plan  · Chronic microcytic but keeps dropping from previous - check iron panel and ferritin , fecal occult- no report of bleeding  · Hemoglobin down to 8 7  · Monitor    Hypertension  Assessment & Plan  · Blood pressure acceptable  Continue with the current care     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, with long-term current use of insulin [facility])  Assessment & Plan  Lab Results   Component Value Date    HGBA1C 14 6 (H) 08/22/2019       Recent Labs     09/18/19 2006 09/19/19  0550 09/19/19  1102 09/19/19  1555   POCGLU 331* 273* 294* 350*       Blood Sugar Average: Last 72 hrs:  · Patient seen and examined  Reported that she uses 40 units of Lantus b i d  And 30-35 units of Humalog with meals  · Will adjust the insulin and monitor      VTE Pharmacologic Prophylaxis:   Pharmacologic: Enoxaparin (Lovenox)  Mechanical VTE Prophylaxis in Place: Yes    Patient Centered Rounds: I have performed bedside rounds with nursing staff today  Discussions with Specialists or Other Care Team Provider:     Education and Discussions with Family / Patient:  Patient and family    Time Spent for Care: 30 minutes  More than 50% of total time spent on counseling and coordination of care as described above      Current Length of Stay: 3 day(s)    Current Patient Status: Inpatient Certification Statement: The patient will continue to require additional inpatient hospital stay due to Rheumatology workup    Discharge Plan:     Code Status: Level 1 - Full Code      Subjective:   Patient seen and examined  Patient reported that her left upper extremity is swollen tender very similar to what happened on the right  Right upper extremity is improving  Able to move the right upper extremity better but the left upper extremity pain and swelling is significantly worse at this point  Given similar presentation on the left-sided to this is unlikely an infection  Will discontinue antibiotics  Blood culture has been remaining  Rheumatology workup is pending  Patient denied any recent GI symptoms or any sexual exposure  Patient reported that headache is not present today  Visual change is stable since December of last year  Objective:     Vitals:   Temp (24hrs), Av 2 °F (37 3 °C), Min:98 8 °F (37 1 °C), Max:99 5 °F (37 5 °C)    Temp:  [98 8 °F (37 1 °C)-99 5 °F (37 5 °C)] 99 5 °F (37 5 °C)  HR:  [88-90] 90  Resp:  [18-20] 20  BP: (127-149)/(63-69) 127/69  SpO2:  [97 %-99 %] 98 %  Body mass index is 45 86 kg/m²  Input and Output Summary (last 24 hours): Intake/Output Summary (Last 24 hours) at 2019 1910  Last data filed at 2019 1726  Gross per 24 hour   Intake 2460 ml   Output 3700 ml   Net -1240 ml       Physical Exam:     Physical Exam   Constitutional: No distress  Eyes: Right eye exhibits no discharge  Left eye exhibits no discharge  Neck: No JVD present  Cardiovascular: Normal rate  No murmur heard  Pulmonary/Chest: Effort normal    Abdominal: Soft  Musculoskeletal: She exhibits edema (Bilateral hand swelling and tenderness)  Neurological: She is alert  No cranial nerve deficit  Skin: Skin is warm  She is not diaphoretic  Psychiatric: She has a normal mood and affect         Additional Data:     Labs:    Results from last 7 days   Lab Units 09/19/19  0537 09/17/19  0524   WBC Thousand/uL 15 50* 13 10*   HEMOGLOBIN g/dL 8 5* 8 7*   HEMATOCRIT % 26 8* 27 2*   PLATELETS Thousands/uL 598* 528*   NEUTROS PCT %  --  74*   LYMPHS PCT %  --  14*   LYMPHO PCT % 16*  --    MONOS PCT %  --  7   MONO PCT % 10  --    EOS PCT % 2 4     Results from last 7 days   Lab Units 09/19/19  0537   POTASSIUM mmol/L 4 5   CHLORIDE mmol/L 99   CO2 mmol/L 29   BUN mg/dL 12   CREATININE mg/dL 0 69   CALCIUM mg/dL 8 6   ALK PHOS U/L 306*   ALT U/L 50   AST U/L 36           * I Have Reviewed All Lab Data Listed Above  * Additional Pertinent Lab Tests Reviewed: [facility] 64 Admission Reviewed    Imaging:    Imaging Reports Reviewed Today Include:   Imaging Personally Reviewed by Myself Includes:      Recent Cultures (last 7 days):     Results from last 7 days   Lab Units 09/16/19  1028 09/16/19  0945   BLOOD CULTURE  No Growth at 72 hrs  No Growth at 72 hrs         Last 24 Hours Medication List:     Current Facility-Administered Medications:  acetaminophen 650 mg Oral Q6H PRN Strauss Peeling, DO    albuterol 2 puff Inhalation Q6H PRN Mine Zayas MD    aspirin 81 mg Oral Daily Mine Zayas MD    atorvastatin 40 mg Oral Daily Mine Zayas MD    budesonide-formoterol 2 puff Inhalation BID Mine Zayas MD    diltiazem 420 mg Oral Daily Mine Zayas MD    enoxaparin 40 mg Subcutaneous Q24H [facility] Mine Zayas MD    gabapentin 100 mg Oral BID Mine Zayas MD    insulin glargine 35 Units Subcutaneous Q12H [facility] David Mon MD    insulin lispro 1-6 Units Subcutaneous HS David Mon MD    insulin lispro 18 Units Subcutaneous TID With Meals David Mon MD    insulin lispro 2-12 Units Subcutaneous TID [facility] David Mon MD    insulin regular 5 Units Subcutaneous Once Ibis ADAMS PA-C    losartan 100 mg Oral Daily Mine Zayas MD    morphine injection 4 mg Intravenous Q4H PRN Strauss Peeling, DO    oxyCODONE 5 mg Oral Q6H PRN Mine Zayas MD sodium chloride 125 mL/hr Intravenous Continuous Joaquín Luke MD Last Rate: 125 mL/hr (09/19/19 8914)        Today, Patient Was Seen By: Lora Bowles MD    ** Please Note: Dictation voice to text software may have been used in the creation of this document   **

## 2019-09-19 NOTE — ASSESSMENT & PLAN NOTE
· Resume statin   · Transaminitis improved, alkaline phosphatase is up trending  · Monitor  · Obtain hepatitis panel tomorrow  · Ultrasound reviewed

## 2019-09-19 NOTE — PROGRESS NOTES
Still has swelling in both hands  Range of motion is actually less than the left hand  Right hand is better  Which he needs now is occupational therapy to control the edema in the hand and to make sure the joints do not get stiff  Neurological consultation will be appreciated to help explain hopefully what is happening  No surgery is indicated

## 2019-09-19 NOTE — NURSING NOTE
Received pt this evening  Pt is AAO x4 and pt has  complaints of right UE and left UE pain at this time  Pt has +2 edema to right UE and trace edema to left UE, and has palpable pulses in all extremities  Pt lung sounds are clear and heart tones are regular  Bed is low and locked, call bell is in reach, will continue to monitor   Chris Castrejon RN

## 2019-09-19 NOTE — PROGRESS NOTES
Moves right hand better     + Right radial pulse  Right hand is less swollen  Decreased ROM Right wrist and fingers  Continue physical therapy

## 2019-09-19 NOTE — ASSESSMENT & PLAN NOTE
· Progressively getting worse for past week- with weakness- she reports weakness in b/l upper extremities but right worse then left   Unknown etiology at this time   · She has wbc and thrombocytosis   · CT of the upper extremity is negative for any acute pathology  · Upper extremity Doppler is negative for any acute dvt  · Patient was initially started on antibiotics but since she developed similar symptoms on the other side antibiotics were discontinued likely Rheumatology in nature  Rheumatology workup in progress  · Vascular surgery evaluation appreciated  Do not appear to have compartment syndrome  Plastic surgery consultation appreciated  · Oxycodone for pain   · Esr -is above 130 which is way  higher than what is expected from an infection  · Uric acid -within normal limits  · Given high ESR Will obtain a rheumatology workup with rheumatoid factor factor is negative  · Will order further rheumatology workup at this point    · Patient denies any recent sexual exposure or any GI symptoms  · Need to discuss with vascular surgery about the possibility of obtaining a temporal artery biopsy

## 2019-09-19 NOTE — PLAN OF CARE
Problem: PAIN - ADULT  Goal: Verbalizes/displays adequate comfort level or baseline comfort level  Description  Interventions:  - Encourage patient to monitor pain and request assistance  - Assess pain using appropriate pain scale  - Administer analgesics based on type and severity of pain and evaluate response  - Implement non-pharmacological measures as appropriate and evaluate response  - Consider cultural and social influences on pain and pain management  - Notify physician/advanced practitioner if interventions unsuccessful or patient reports new pain  Outcome: Progressing     Problem: INFECTION - ADULT  Goal: Absence or prevention of progression during hospitalization  Description  INTERVENTIONS:  - Assess and monitor for signs and symptoms of infection  - Monitor lab/diagnostic results  - Monitor all insertion sites, i e  indwelling lines, tubes, and drains  - Monitor endotracheal if appropriate and nasal secretions for changes in amount and color  - [place] appropriate cooling/warming therapies per order  - Administer medications as ordered  - Instruct and encourage patient and family to use good hand hygiene technique  - Identify and instruct in appropriate isolation precautions for identified infection/condition  Outcome: Progressing     Problem: SAFETY ADULT  Goal: Patient will remain free of falls  Description  INTERVENTIONS:  - Assess patient frequently for physical needs  -  Identify cognitive and physical deficits and behaviors that affect risk of falls    -  [place] fall precautions as indicated by assessment   - Educate patient/family on patient safety including physical limitations  - Instruct patient to call for assistance with activity based on assessment  - Modify environment to reduce risk of injury  - Consider OT/PT consult to assist with strengthening/mobility  Outcome: Progressing  Goal: Maintain or return to baseline ADL function  Description  INTERVENTIONS:  -  Assess patient's ability to carry out ADLs; assess patient's baseline for ADL function and identify physical deficits which impact ability to perform ADLs (bathing, care of mouth/teeth, toileting, grooming, dressing, etc )  - Assess/evaluate cause of self-care deficits   - Assess range of motion  - Assess patient's mobility; develop plan if impaired  - Assess patient's need for assistive devices and provide as appropriate  - Encourage maximum independence but intervene and supervise when necessary  - Involve family in performance of ADLs  - Assess for home care needs following discharge   - Consider OT consult to assist with ADL evaluation and planning for discharge  - Provide patient education as appropriate  Outcome: Progressing  Goal: Maintain or return mobility status to optimal level  Description  INTERVENTIONS:  - Assess patient's baseline mobility status (ambulation, transfers, stairs, etc )    - Identify cognitive and physical deficits and behaviors that affect mobility  - Identify mobility aids required to assist with transfers and/or ambulation (gait belt, sit-to-stand, lift, walker, cane, etc )  - [place] fall precautions as indicated by assessment  - Record patient progress and toleration of activity level on Mobility SBAR; progress patient to next Phase/Stage  - Instruct patient to call for assistance with activity based on assessment  - Consider rehabilitation consult to assist with strengthening/weightbearing, etc   Outcome: Progressing     Problem: DISCHARGE PLANNING  Goal: Discharge to home or other facility with appropriate resources  Description  INTERVENTIONS:  - Identify barriers to discharge w/patient and caregiver  - Arrange for needed discharge resources and transportation as appropriate  - Identify discharge learning needs (meds, wound care, etc )  - Arrange for interpretive services to assist at discharge as needed  - Refer to Case Management Department for coordinating discharge planning if the patient needs post-hospital services based on physician/advanced practitioner order or complex needs related to functional status, cognitive ability, or social support system  Outcome: Progressing     Problem: Knowledge Deficit  Goal: Patient/family/caregiver demonstrates understanding of disease process, treatment plan, medications, and discharge instructions  Description  Complete learning assessment and assess knowledge base  Interventions:  - Provide teaching at level of understanding  - Provide teaching via preferred learning methods  Outcome: Progressing     Problem: DISCHARGE PLANNING - CARE MANAGEMENT  Goal: Discharge to post-acute care or home with appropriate resources  Description  INTERVENTIONS:  - Conduct assessment to determine patient/family and health care team treatment goals, and need for post-acute services based on payer coverage, community resources, and patient preferences, and barriers to discharge  - Address psychosocial, clinical, and financial barriers to discharge as identified in assessment in conjunction with the patient/family and health care team  - Arrange appropriate level of post-acute services according to patients   needs and preference and payer coverage in collaboration with the physician and health care team  - Communicate with and update the patient/family, physician, and health care team regarding progress on the discharge plan  - Arrange appropriate transportation to post-acute venues  Outcome: Progressing     Problem: Potential for Falls  Goal: Patient will remain free of falls  Description  INTERVENTIONS:  - Assess patient frequently for physical needs  -  Identify cognitive and physical deficits and behaviors that affect risk of falls    -  [place] fall precautions as indicated by assessment   - Educate patient/family on patient safety including physical limitations  - Instruct patient to call for assistance with activity based on assessment  - Modify environment to reduce risk of injury  - Consider OT/PT consult to assist with strengthening/mobility  Outcome: Progressing

## 2019-09-20 ENCOUNTER — APPOINTMENT (INPATIENT)
Dept: CT IMAGING | Facility: HOSPITAL | Age: 59
DRG: 607 | End: 2019-09-20
Payer: COMMERCIAL

## 2019-09-20 ENCOUNTER — APPOINTMENT (INPATIENT)
Dept: NON INVASIVE DIAGNOSTICS | Facility: HOSPITAL | Age: 59
DRG: 607 | End: 2019-09-20
Payer: COMMERCIAL

## 2019-09-20 ENCOUNTER — APPOINTMENT (INPATIENT)
Dept: MRI IMAGING | Facility: HOSPITAL | Age: 59
DRG: 607 | End: 2019-09-20
Payer: COMMERCIAL

## 2019-09-20 LAB
ANA HOMOGEN SER QL IF: NORMAL
ANA HOMOGEN TITR SER: NORMAL {TITER}
CARDIOLIPIN IGA SER IA-ACNC: <9 APL U/ML (ref 0–11)
CARDIOLIPIN IGG SER IA-ACNC: <9 GPL U/ML (ref 0–14)
CARDIOLIPIN IGM SER IA-ACNC: 13 MPL U/ML (ref 0–12)
CCP IGA+IGG SERPL IA-ACNC: 4 UNITS (ref 0–19)
GLUCOSE SERPL-MCNC: 109 MG/DL (ref 65–140)
GLUCOSE SERPL-MCNC: 183 MG/DL (ref 65–140)
GLUCOSE SERPL-MCNC: 194 MG/DL (ref 65–140)
GLUCOSE SERPL-MCNC: 270 MG/DL (ref 65–140)
PROCALCITONIN SERPL-MCNC: 0.15 NG/ML
RYE IGE QN: POSITIVE

## 2019-09-20 PROCEDURE — 99223 1ST HOSP IP/OBS HIGH 75: CPT | Performed by: NURSE PRACTITIONER

## 2019-09-20 PROCEDURE — 83520 IMMUNOASSAY QUANT NOS NONAB: CPT | Performed by: PSYCHIATRY & NEUROLOGY

## 2019-09-20 PROCEDURE — 84145 PROCALCITONIN (PCT): CPT | Performed by: INTERNAL MEDICINE

## 2019-09-20 PROCEDURE — 93306 TTE W/DOPPLER COMPLETE: CPT

## 2019-09-20 PROCEDURE — 86235 NUCLEAR ANTIGEN ANTIBODY: CPT | Performed by: PSYCHIATRY & NEUROLOGY

## 2019-09-20 PROCEDURE — 70551 MRI BRAIN STEM W/O DYE: CPT

## 2019-09-20 PROCEDURE — 99232 SBSQ HOSP IP/OBS MODERATE 35: CPT | Performed by: INTERNAL MEDICINE

## 2019-09-20 PROCEDURE — 84165 PROTEIN E-PHORESIS SERUM: CPT | Performed by: PATHOLOGY

## 2019-09-20 PROCEDURE — 70498 CT ANGIOGRAPHY NECK: CPT

## 2019-09-20 PROCEDURE — 82378 CARCINOEMBRYONIC ANTIGEN: CPT | Performed by: PSYCHIATRY & NEUROLOGY

## 2019-09-20 PROCEDURE — 84165 PROTEIN E-PHORESIS SERUM: CPT | Performed by: PSYCHIATRY & NEUROLOGY

## 2019-09-20 PROCEDURE — 99223 1ST HOSP IP/OBS HIGH 75: CPT | Performed by: PSYCHIATRY & NEUROLOGY

## 2019-09-20 PROCEDURE — 93306 TTE W/DOPPLER COMPLETE: CPT | Performed by: INTERNAL MEDICINE

## 2019-09-20 PROCEDURE — 86146 BETA-2 GLYCOPROTEIN ANTIBODY: CPT | Performed by: NURSE PRACTITIONER

## 2019-09-20 PROCEDURE — 70496 CT ANGIOGRAPHY HEAD: CPT

## 2019-09-20 PROCEDURE — 86162 COMPLEMENT TOTAL (CH50): CPT | Performed by: PSYCHIATRY & NEUROLOGY

## 2019-09-20 PROCEDURE — 82948 REAGENT STRIP/BLOOD GLUCOSE: CPT

## 2019-09-20 RX ORDER — INSULIN GLARGINE 100 [IU]/ML
40 INJECTION, SOLUTION SUBCUTANEOUS EVERY 12 HOURS SCHEDULED
Status: DISCONTINUED | OUTPATIENT
Start: 2019-09-20 | End: 2019-09-22

## 2019-09-20 RX ORDER — LORAZEPAM 2 MG/ML
0.5 INJECTION INTRAMUSCULAR ONCE
Status: DISCONTINUED | OUTPATIENT
Start: 2019-09-20 | End: 2019-09-23 | Stop reason: HOSPADM

## 2019-09-20 RX ADMIN — ASPIRIN 81 MG 81 MG: 81 TABLET ORAL at 09:13

## 2019-09-20 RX ADMIN — GABAPENTIN 100 MG: 100 CAPSULE ORAL at 17:59

## 2019-09-20 RX ADMIN — INSULIN LISPRO 2 UNITS: 100 INJECTION, SOLUTION INTRAVENOUS; SUBCUTANEOUS at 09:15

## 2019-09-20 RX ADMIN — INSULIN GLARGINE 35 UNITS: 100 INJECTION, SOLUTION SUBCUTANEOUS at 09:14

## 2019-09-20 RX ADMIN — SODIUM CHLORIDE 125 ML/HR: 0.9 INJECTION, SOLUTION INTRAVENOUS at 01:19

## 2019-09-20 RX ADMIN — INSULIN GLARGINE 40 UNITS: 100 INJECTION, SOLUTION SUBCUTANEOUS at 21:48

## 2019-09-20 RX ADMIN — DILTIAZEM HYDROCHLORIDE 420 MG: 240 CAPSULE, EXTENDED RELEASE ORAL at 09:13

## 2019-09-20 RX ADMIN — IOHEXOL 100 ML: 350 INJECTION, SOLUTION INTRAVENOUS at 14:16

## 2019-09-20 RX ADMIN — GABAPENTIN 100 MG: 100 CAPSULE ORAL at 09:13

## 2019-09-20 RX ADMIN — INSULIN LISPRO 6 UNITS: 100 INJECTION, SOLUTION INTRAVENOUS; SUBCUTANEOUS at 11:33

## 2019-09-20 RX ADMIN — BUDESONIDE AND FORMOTEROL FUMARATE DIHYDRATE 2 PUFF: 80; 4.5 AEROSOL RESPIRATORY (INHALATION) at 21:48

## 2019-09-20 RX ADMIN — ENOXAPARIN SODIUM 40 MG: 40 INJECTION SUBCUTANEOUS at 09:12

## 2019-09-20 RX ADMIN — LOSARTAN POTASSIUM 100 MG: 50 TABLET ORAL at 09:13

## 2019-09-20 RX ADMIN — ATORVASTATIN CALCIUM 40 MG: 40 TABLET, FILM COATED ORAL at 09:13

## 2019-09-20 RX ADMIN — SODIUM CHLORIDE 125 ML/HR: 0.9 INJECTION, SOLUTION INTRAVENOUS at 09:12

## 2019-09-20 RX ADMIN — INSULIN LISPRO 1 UNITS: 100 INJECTION, SOLUTION INTRAVENOUS; SUBCUTANEOUS at 21:48

## 2019-09-20 RX ADMIN — BUDESONIDE AND FORMOTEROL FUMARATE DIHYDRATE 2 PUFF: 80; 4.5 AEROSOL RESPIRATORY (INHALATION) at 09:24

## 2019-09-20 NOTE — CONSULTS
Medical Oncology/Hematology Consult Note  Spohie Brumfield, female, 59 y o , 1960,  5T /5T -01, [id number]     Assessment and Plan  1  Leukocytosis:  Does not appear to be infectious in nature patient without fever  Mainly neutrophilia and monocytosis without any hint of immature cells suggesting reactive process which is the most likely explanation with her significant elevation of her inflammatory markers  Continue to trend CBC  There is high suspicion for autoimmune disorder with patient's symptoms, inflammation and positive JUAN  Extensive workup was already ordered and for autoimmune disorder  2  Anemia:  Acute on chronic  Multifactorial including anemia of chronic disease/inflammation and iron deficiency  We will order the usual anemia workup to rule out other etiologies of her anemia including hemolysis  Patient does have some evidence of iron deficiency with low serum iron and low transferrin saturation  She may benefit from a few doses of IV iron which we will order  3  Thrombocytosis:  As above likely reactive process due to significant inflammation, iron deficiency may also be contributory  We will continue to monitor, trend CBC  4  Upper extremity swelling/weakness:  Agree that patient's symptoms are likely related to undiagnosed autoimmune process  Also agree with Neurology that CT of the chest abdomen and pelvis should be entertained to rule out malignant process  Patient did have abnormal chest imaging August 2018 at which time 6-12 months follow-up was recommended  Evaluate also for bone lesions on CT imaging with elevated alk-phos of unknown etiology  We will order the CT imaging of the chest abdomen and pelvis  5  Sickle cell trait:  Patient states she has a history of sickle cell trait that she received from her mother who also is a sickle cell trait carrier  We will order hemoglobin electrophoresis to document the finding    Otherwise no further treatment or workup required  Reason for consultation:  Leukocytosis, anemia, history of sickle cell trait    History of present illness:   Patient is a pleasant 25-year-old  female who presented to the emergency department on Monday 09/16/2019 after of one-week history bilateral upper extremity pain, swelling and weakness  She has a past medical history of epilepsy diagnosed in childhood, sickle cell trait, asthma, obstructive sleep apnea, hypertension, diabetes, hyperlipidemia, migraines and arthritis  She does state that she has a history of 3 miscarriages in the past 1 prior to 10 weeks the other 2 were after 10 weeks  The patient states that when she presented to the emergency department she could barely move her arm but states that today she is now able to move her arms but continues to have weakness in her hands and swelling  The swelling seems to be increased and the left hand today  The patient denies any drenching night sweat, mouth sores, weight loss or lymphadenopathy  She denies easy bruising or bleeding from any site  Reports that she has been anemic for some time and has taken iron pills on and off for many years; denies any history of blood transfusions or iron infusions  The patient also reports chronic rash that waxes and wanes to her anterior chest and upper back; she states that the rash seems to be exacerbated by the sunlight  Patient's most recent laboratory studies from yesterday showed elevated WBC 15 5 mainly neutrophilia and monocytosis without any hint of immature cells on the peripheral blood  She has microcytic anemia H&H 8 5/26 8 with MCV is 76 her platelet count is also elevated at 598,000  Inflammatory markers are significantly elevated C reactive protein greater than 90 sed rate 127 her JUAN came back positive    Her AST and ALT were elevated upon admission but are back to the normal range as of yesterday, she continues to have a elevated alkaline phosphatase at 306  Additional studies were done earlier this week showed Hep C testing came back equivocal, normal uric acid, transferrin saturation low 15% TIBC low 215 serum iron low 33  Ferritin elevated 560 likely due to significant inflammatory process as ferritin is acute phase reactant  She had coags drawn on the 9th of September which showed normal normal PT and slight prolongation of the PTT 33  Her Doppler studies were negative for DVT CT scan of the right upper extremity was negative but did reveal an enlarged left submandibular lymph node  Ultrasound of the abdomen showed hepatomegaly and cholelithiasis  She had a CTA scan of head/neck and MRI of the head today results are still pending  Review of Systems:   Review of Systems   Constitutional: Positive for activity change, diaphoresis (to her head at HS) and fatigue  Negative for appetite change, fever and unexpected weight change  HENT: Negative for congestion, mouth sores and trouble swallowing  Respiratory: Positive for cough  Negative for shortness of breath  Cardiovascular: Negative for chest pain and leg swelling  Gastrointestinal: Positive for constipation  Negative for abdominal distention, abdominal pain, anal bleeding, blood in stool and nausea  Genitourinary: Negative for difficulty urinating  Musculoskeletal:        BUE weakness, pain and swelling  Pitting edema in the left hand   Skin: Positive for rash  Neurological: Positive for dizziness, weakness and headaches (History of migraines none at present)  Psychiatric/Behavioral: Positive for dysphoric mood and sleep disturbance         Past Medical History:   Diagnosis Date    Anemia     Arthritis     [facility]     Dyspnea on exertion     Hyperlipidemia     Hypertension     Legally blind 2010    Mild intermittent asthma with exacerbation 8/4/2018    Miscarriage     x 3    Seizures (HCC)     [facility] cell trait [facility]     Sleep apnea        Past Surgical History: Procedure Laterality Date    CATARACT EXTRACTION Bilateral     august and september    EYE SURGERY      HYSTERECTOMY      42    OOPHORECTOMY Left     44       Family History   Problem Relation Age of Onset    Heart attack Mother     Heart disease Mother     Hypertension Mother     No Known Problems Father     Heart disease Sister     No Known Problems Brother     No Known Problems Son     No Known Problems Daughter     Heart attack Maternal Grandmother     Heart disease Maternal Grandmother     Diabetes Other     Heart attack Other     No Known Problems Sister     No Known Problems Sister     No Known Problems Paternal Aunt     No Known Problems Son     Cancer Neg Hx     Stroke Neg Hx        Social History     Socioeconomic History    Marital status: /Civil Union     Spouse name: None    Number of children: None    Years of education: None    Highest education level: None   Occupational History    Occupation: employed   Social Needs    Financial resource strain: None    Food insecurity:     Worry: None     Inability: None    Transportation needs:     Medical: None     Non-medical: None   Tobacco Use    Smoking status: Never Smoker    Smokeless tobacco: Never Used   Substance and Sexual Activity    Alcohol use: Never     Frequency: Never    Drug use: No    Sexual activity: Never     Birth control/protection: None   Lifestyle    Physical activity:     Days per week: None     Minutes per session: None    Stress: None   Relationships    Social connections:     Talks on phone: None     Gets together: None     Attends Oriental orthodox service: None     Active member of club or organization: None     Attends meetings of clubs or organizations: None     Relationship status: None    Intimate partner violence:     Fear of current or ex partner: None     Emotionally abused: None     Physically abused: None     Forced sexual activity: None   Other Topics Concern    None   Social History Narrative    Caffeine use         Current Facility-Administered Medications:     acetaminophen (TYLENOL) tablet 650 mg, 650 mg, Oral, Q6H PRN, Zenia Garrison DO, 650 mg at 09/18/19 1943    albuterol (PROVENTIL HFA,VENTOLIN HFA) inhaler 2 puff, 2 puff, Inhalation, Q6H PRN, Raysa Dodson MD, 2 puff at 09/17/19 2207    aspirin chewable tablet 81 mg, 81 mg, Oral, Daily, Raysa Dodson MD, 81 mg at 09/20/19 0913    atorvastatin (LIPITOR) tablet 40 mg, 40 mg, Oral, Daily, Raysa Dodson MD, 40 mg at 09/20/19 0913    budesonide-formoterol (SYMBICORT) 80-4 5 MCG/ACT inhaler 2 puff, 2 puff, Inhalation, BID, Raysa Dodson MD, 2 puff at 09/20/19 0924    diltiazem (CARDIZEM CD) 24 hr capsule 420 mg, 420 mg, Oral, Daily, aRysa Dodson MD, 420 mg at 09/20/19 0913    enoxaparin (LOVENOX) subcutaneous injection 40 mg, 40 mg, Subcutaneous, Q24H [address], Raysa Dodson MD, 40 mg at 09/20/19 0912    gabapentin (NEURONTIN) capsule 100 mg, 100 mg, Oral, BID, Raysa Dodson MD, 100 mg at 09/20/19 0913    insulin glargine (LANTUS) subcutaneous injection 40 Units 0 4 mL, 40 Units, Subcutaneous, Q12H [address], Isela Landeros DO    insulin lispro (HumaLOG) 100 units/mL subcutaneous injection 1-6 Units, 1-6 Units, Subcutaneous, HS, Melisa Quinn MD, 2 Units at 09/19/19 2154    insulin lispro (HumaLOG) 100 units/mL subcutaneous injection 18 Units, 18 Units, Subcutaneous, TID With Meals, Melisa Quinn MD, 18 Units at 09/20/19 1133    insulin lispro (HumaLOG) 100 units/mL subcutaneous injection 2-12 Units, 2-12 Units, Subcutaneous, TID AC, 6 Units at 09/20/19 1133 **AND** Fingerstick Glucose (POCT), , , TID AC, Melisa Quinn MD    insulin regular (HumuLIN R,NovoLIN R) injection 5 Units, 5 Units, Subcutaneous, Once, Ryan ADAMS PA-C    LORazepam (ATIVAN) 2 mg/mL injection 0 5 mg, 0 5 mg, Intravenous, Once, Isela Landeros DO    losartan (COZAAR) tablet 100 mg, 100 mg, Oral, Daily, Raysa Dodson MD, 100 mg at 09/20/19 0913    morphine (PF) 4 mg/mL injection 4 mg, 4 mg, Intravenous, Q4H PRN, Opal Cooks, , 4 mg at 09/16/19 2000    oxyCODONE (ROXICODONE) IR tablet 5 mg, 5 mg, Oral, Q6H PRN, Lillian Lal MD    Medications Prior to Admission   Medication    albuterol (PROVENTIL HFA) 90 mcg/act inhaler    aspirin 81 MG tablet    atorvastatin (LIPITOR) 40 mg tablet    BASAGLAR KWIKPEN 100 units/mL injection pen    SARAH MICROLET LANCETS lancets    Blood Glucose Monitoring Suppl (CONTOUR NEXT MONITOR) w/Device KIT    Blood Pressure Monitor KIT    budesonide-formoterol (SYMBICORT) 80-4 5 MCG/ACT inhaler    chlorthalidone 25 mg tablet    Cholecalciferol (VITAMIN D3) 3000 units TABS    CONTOUR NEXT TEST test strip    diltiazem (TIAZAC) 420 MG 24 hr capsule    gabapentin (NEURONTIN) 100 mg capsule    insulin lispro (HUMALOG KWIKPEN) 100 units/mL injection pen    Insulin Pen Needle (BD PEN NEEDLE DERRICK U/F) 32G X 4 MM MISC    losartan (COZAAR) 100 MG tablet    metFORMIN (GLUCOPHAGE) 500 mg tablet       No Known Allergies      Physical Exam:    /72 (BP Location: Left arm)   Pulse 86   Temp 98 4 °F (36 9 °C) (Temporal)   Resp 20   Ht 5' 8" (1 727 m)   Wt (!) 137 kg (301 lb 9 4 oz)   SpO2 99%   BMI 45 86 kg/m²     Physical Exam   Constitutional: She is oriented to person, place, and time  She appears well-developed and well-nourished  No distress  HENT:   Head: Normocephalic and atraumatic  Mouth/Throat: Oropharynx is clear and moist  No oropharyngeal exudate  Eyes: Pupils are equal, round, and reactive to light  Conjunctivae are normal  No scleral icterus  Arcus senilis   Neck: Normal range of motion  Neck supple  No thyromegaly present  Cardiovascular: Normal rate, regular rhythm, normal heart sounds and intact distal pulses  No murmur heard  Pulmonary/Chest: Effort normal and breath sounds normal  No respiratory distress  Abdominal: Soft   Bowel sounds are normal  She exhibits no distension  There is no hepatosplenomegaly  There is no tenderness  Obese   Musculoskeletal: She exhibits edema (+1 BUE, +2-3 both hands +pitting increased on the left)  Lymphadenopathy:     She has no cervical adenopathy  She has no axillary adenopathy  Neurological: She is alert and oriented to person, place, and time  Patient with weakness to both hands- unable to grasp hands appropriately   Skin: Skin is warm and dry  Rash (Flat rash noted upper chest and upper back-mainly pigment changes) noted  She is not diaphoretic  No erythema  No pallor  Multiple small brown flat scattered skin lesions noted to lower extremites   Psychiatric: Judgment and thought content normal  Her affect is blunt  She is slowed  Vitals reviewed        Recent Results (from the past 48 hour(s))   Fingerstick Glucose (POCT)    Collection Time: 09/18/19  8:06 PM   Result Value Ref Range    POC Glucose 331 (H) 65 - 140 mg/dl   CBC and differential    Collection Time: 09/19/19  5:37 AM   Result Value Ref Range    WBC 15 50 (H) 4 50 - 11 00 Thousand/uL    RBC 3 52 (L) 4 00 - 5 20 Million/uL    Hemoglobin 8 5 (L) 12 0 - 16 0 g/dL    Hematocrit 26 8 (L) 36 0 - 46 0 %    MCV 76 (L) 80 - 100 fL    MCH 24 2 (L) 26 0 - 34 0 pg    MCHC 31 8 31 0 - 36 0 g/dL    RDW 15 6 (H) <15 3 %    MPV 7 8 (L) 8 9 - 12 7 fL    Platelets 176 (H) 699 - 450 Thousands/uL   Comprehensive metabolic panel    Collection Time: 09/19/19  5:37 AM   Result Value Ref Range    Sodium 133 (L) 137 - 147 mmol/L    Potassium 4 5 3 6 - 5 0 mmol/L    Chloride 99 97 - 108 mmol/L    CO2 29 22 - 30 mmol/L    ANION GAP 5 5 - 14 mmol/L    BUN 12 5 - 25 mg/dL    Creatinine 0 69 0 60 - 1 20 mg/dL    Glucose 276 (H) 70 - 99 mg/dL    Calcium 8 6 8 4 - 10 2 mg/dL    AST 36 14 - 36 U/L    ALT 50 9 - 52 U/L    Alkaline Phosphatase 306 (H) 43 - 122 U/L    Total Protein 7 0 5 9 - 8 4 g/dL    Albumin 3 3 3 0 - 5 2 g/dL    Total Bilirubin 0 60 <1 30 mg/dL    eGFR 109 >60 ml/min/1 73sq m C-reactive protein    Collection Time: 09/19/19  5:37 AM   Result Value Ref Range    CRP >90 0 (H) <10 0 mg/L   Manual Differential (Non Wam)    Collection Time: 09/19/19  5:37 AM   Result Value Ref Range    Segmented % 72 (H) 45 - 65 %    Lymphocytes % 16 (L) 25 - 45 %    Monocytes % 10 1 - 10 %    Eosinophils, % 2 0 - 6 %    Neutrophils Absolute 11 16 (H) 1 80 - 7 80 Thousand/uL    Lymphocytes Absolute 2 48 0 50 - 4 00 Thousand/uL    Monocytes Absolute 1 55 (H) 0 20 - 0 90 Thousand/uL    Eosinophils Absolute 0 31 0 00 - 0 40 Thousand/uL    Total Counted 100     RBC Morphology Present     Anisocytosis Present     Hypochromia Present     Microcytes Present     Platelet Estimate Increased (A) Adequate   Fingerstick Glucose (POCT)    Collection Time: 09/19/19  5:50 AM   Result Value Ref Range    POC Glucose 273 (H) 65 - 140 mg/dl   Fingerstick Glucose (POCT)    Collection Time: 09/19/19 11:02 AM   Result Value Ref Range    POC Glucose 294 (H) 65 - 140 mg/dl   Fingerstick Glucose (POCT)    Collection Time: 09/19/19  3:55 PM   Result Value Ref Range    POC Glucose 350 (H) 65 - 140 mg/dl   Fingerstick Glucose (POCT)    Collection Time: 09/19/19  9:53 PM   Result Value Ref Range    POC Glucose 226 (H) 65 - 140 mg/dl   Fingerstick Glucose (POCT)    Collection Time: 09/20/19  5:54 AM   Result Value Ref Range    POC Glucose 194 (H) 65 - 140 mg/dl   Fingerstick Glucose (POCT)    Collection Time: 09/20/19 10:58 AM   Result Value Ref Range    POC Glucose 270 (H) 65 - 140 mg/dl       Xr Chest 1 View Portable    Result Date: 9/10/2019  Narrative: CHEST INDICATION:   shoulder pain  COMPARISON:  8/4/2018 EXAM PERFORMED/VIEWS:  XR CHEST PORTABLE FINDINGS: Cardiomediastinal silhouette appears unremarkable  The lungs are clear  No pneumothorax or pleural effusion  Osseous structures appear within normal limits for patient age  Impression: No acute cardiopulmonary disease   Workstation performed: [id number]     Xr Forearm 2 Views Right    Result Date: 9/16/2019  Narrative: RIGHT FOREARM INDICATION:   pain and swelling  COMPARISON:  None VIEWS:  XR FOREARM 2 VW RIGHT FINDINGS: There is no acute fracture or dislocation  There are degenerative changes of the elbow and wrist  No lytic or blastic lesions are seen  Soft tissues are unremarkable  Impression: No acute osseous abnormality  Workstation performed: [id number]     Xr Hand 3+ Views Right    Result Date: 9/16/2019  Narrative: RIGHT HAND INDICATION:   pain, swelling  COMPARISON:  None VIEWS:  XR HAND 3+ VW RIGHT For the purposes of institution wide universal language the following terms will apply: (thumb=1st digit/finger, index finger=2nd digit/finger, long finger=3rd digit/finger, ring=4th digit/finger and small finger=5th digit/finger) FINDINGS: There is no acute fracture or dislocation  Degenerative changes of 1st carpometacarpal joint, radiocarpal joint and scattered throughout the DIP and PIP joints  No lytic or blastic lesions are seen  There is dorsal soft tissue swelling overlying the hand  Impression: No acute osseous abnormality  Dorsal soft tissue swelling  Degenerative changes as described  Workstation performed: [id number]     Ct Head Without Contrast    Result Date: 9/9/2019  Narrative: CT BRAIN - WITHOUT CONTRAST INDICATION:   Headache, acute, norm neuro exam  COMPARISON:  None  TECHNIQUE:  CT examination of the brain was performed  In addition to axial images, coronal 2D reformatted images were created and submitted for interpretation  Radiation dose length product (DLP) for this visit:  1065 mGy-cm   This examination, like all CT scans performed in the [facility], was performed utilizing techniques to minimize radiation dose exposure, including the use of iterative reconstruction and automated exposure control  IMAGE QUALITY:  Diagnostic  FINDINGS: PARENCHYMA:  No intracranial mass, mass effect or midline shift   No CT signs of acute infarction  No acute parenchymal hemorrhage  There is mild periventricular white matter low attenuation which is nonspecific and most likely related to chronic small vessel ischemic changes  VENTRICLES AND EXTRA-AXIAL SPACES:  There is prominence of the ventricles and sulci related to mild volume loss  VISUALIZED ORBITS AND PARANASAL SINUSES:  Unremarkable  CALVARIUM AND EXTRACRANIAL SOFT TISSUES:  Normal      Impression: No acute intracranial abnormality  Mild chronic small vessel ischemic changes and volume loss  Workstation performed: [id number]     Cta Upper Extremity Right W Wo Contrast    Result Date: 9/16/2019  Narrative: CT ANGIOGRAM OF THE RIGHT UPPER EXTREMITY, WITH AND WITHOUT IV CONTRAST INDICATION:  Swelling  Weakness  Intact pulses  COMPARISON: None  TECHNIQUE:  CT angiogram examination of the chest was performed according to standard protocol  Contrast as well as noncontrast images were obtained  This examination, like all CT scans performed in the [facility], was performed utilizing techniques to minimize radiation dose exposure, including the use of iterative reconstruction and automated exposure control  3D reconstructions were performed an independent workstation, and are supplied for review  Rad dose 2317 mGy-cm IV Contrast:  100 mL of iohexol (OMNIPAQUE)  FINDINGS: VASCULAR STRUCTURES:  The aortic arch is normal with variant branching anatomy  A common trunk supplies the right brachiocephalic artery and left common carotid artery  The right subclavian artery, axillary artery, and brachial artery are widely patent  with continuous three-vessel runoff to the hand  Edema is noted in the subcutaneous fat of the distal forearm and hand without a vascular etiology  VISUALIZED STRUCTURES OF THE CHEST: 27 x 17 mm left submandibular lymph node       Impression: Normal CTA of the right upper extremity Enlarged left submandibular lymph node Workstation performed: [id number]      Gallbladder    Result Date: 9/17/2019  Narrative: RIGHT UPPER QUADRANT ULTRASOUND INDICATION:     ABD PAIN, FEVER, NO RECENT SURGERY elevation alk phos and llfts acute  COMPARISON:  None TECHNIQUE:   Real-time ultrasound of the right upper quadrant was performed with a curvilinear transducer with both volumetric sweeps and still imaging techniques  FINDINGS: PANCREAS:  Visualized portions of the pancreas are within normal limits  AORTA AND IVC:  Visualized portions are normal for patient age  LIVER: Size:  Moderately enlarged  The liver measures 20 cm in the midclavicular line  Contour:  Surface contour is smooth  Parenchyma:  Echogenicity and echotexture are within normal limits  No evidence of suspicious mass  Limited imaging of the main portal vein shows it to be patent and hepatopetal   BILIARY: The gallbladder is normal in caliber  No wall thickening or pericholecystic fluid  There are multiple dependent calculi without sludge  No sonographic Mcconnell sign  No intrahepatic biliary dilatation  CBD measures 6 mm  No choledocholithiasis  KIDNEY: Right kidney measures 9 8 x 4 3 cm  Within normal limits  ASCITES:   None  Impression: 1  Hepatomegaly  2   Cholelithiasis  Workstation performed: [id number]     Xr Chest 1 View    Result Date: 9/16/2019  Narrative: CHEST INDICATION:   cough  COMPARISON:  Chest radiograph 9/9/2019 EXAM PERFORMED/VIEWS:  XR CHEST 1 VIEW FINDINGS: Cardiomediastinal silhouette appears unremarkable  The lungs are clear  No pneumothorax or pleural effusion  Osseous structures appear within normal limits for patient age  Impression: No acute cardiopulmonary disease  Workstation performed: [id number]     Vas Upper Limb Venous Duplex Scan, Unilateral/limited    Result Date: 9/16/2019  Narrative:  [facility] REPORT CLINICAL: Indications: Patient presents with upper lower extremity pain and swelling x 5 days   Operative History: Denies Any Cardiovascular Procedures Risk Factors The patient has history of Obesity, HTN, Sickle Cell Trait,  Diabetes (Yes) and HLD  FINDINGS:  Segment       Right            Left                        Impression       Impression       Int  Jugular  Normal (Patent)  Normal (Patent)  Bas Upper     Normal                            Ceph Upper    Normal                               CONCLUSION:  Impression RIGHT UPPER LIMB: No evidence of acute or chronic deep vein thrombosis  No evidence of superficial thrombophlebitis noted  Doppler evaluation shows a normal response to augmentation maneuvers  LEFT UPPER LIMB LIMITED: Evaluation shows no evidence of thrombus in the internal jugular vein, subclavian vein, and the brachiocephalic vein  No previous study available for comparison  Technical findings shared with Dr Marco A Welch and faxed to chart  SIGNATURE: Electronically Signed by: Marika Almaraz on 2019-09-16 07:23:21 PM      Labs and pertinent reports reviewed

## 2019-09-20 NOTE — NURSING NOTE
Received pt in PM  On assessment AAOx4, abdomen rounded/taut with + bowel sounds  Pt states she is constipated, prune juice given  No tenderness or abdominal pain present  +1 edema noted in B/L upper extremities  Arms are wrapped  IV fluids infusing  Will continue to monitor, call bell and bedside bell within reach
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
## 2019-09-20 NOTE — CONSULTS
Consultation - Neurology   Emi Marsh 59 y o  female MRN: [id number]  Unit/Bed#: 5T -01 Encounter: [id number]    Assessment/Plan   49-year-old female with past medical history listed below, the patient does have a history of type 2 diabetes with long-term use of insulin, history of retinopathy and neuropathy, she has a history of hypertension, anemia, hyperlipidemia, sleep apnea, obesity, severe left ventricular hypertrophy, as well as hx of seizure as child  She reports that she was recently diagnosed with migraine  The patient presented to the ED on September 9th - with headache and bilateral extremity weakness swelling and pain and was discharged from the ED details below, she has been having increased frequency of headaches as well  She presents to [facility] with increased swelling, pain, weakness in her bilateral upper extremities, moser has improved  No neurological imaging to review  Details and neurological examination as below  HA -  increased frequency of headaches since 2018, appears to be migraine in nature by description  Her BS have been uncontrolled as well, which may be contributing, ? Assoc  temporal arteritis/vasculitis with her presenting symptoms of shoulder girdle weakness/edema/weakness, although no ha currently - no jaw claudication or temporal pain currently, atypical features (reviewed with attending believed to be less likely)  She has not been compliant with her mask with her hx of sleep apnea, this may be contributing as well  No evidence on examination to support CNS infectious process ( no nuchal rigidity on examination, non ill appearing - will monitor closely)  No evidence to support increase intracranial pressure / pseudotumor cerebri on examination  No evidence to support bleed [facility])/ non focal exam   No evidence to support stoke non focal examination  No evidence on examination to support mass lesion (CT of head 9/9 with out evidence)      No evidence of TMJ    Does not appear to to be a Cluster type headache, SUNCT, or trigeminal neuralgia  Bilateral upper extremity weakness, shoulder girdle weakness/decrease rom, swelling of limbs and pain, with elevated ESR  No evidence to suspect cervical etiology or myelopathy, does not localize to suspect stroke or mass lesion, ? Above contributing, rule out primary rheumatological etiology - ? Vasculitis as reviewed above  No evidence to suspect a neuromuscular junction disorder / process  ? myopathy    Neuropathy  Sleep apnea  DM  HTN  Hyperlipidemia  Prolonged QT    Will review with attending plan of care, further testing and recommendations  -reviewed with attending - plan is for an MRI of the brain with without contrast, CTA of the head and neck, as well as check B12, SPEP, Sjogren's, complement, follow rheumatological work up   -rheumatology evaluation, rheumatalgic work up pending  -hold on LP at this time, will await above testing - monitor examination closely  -would recommend EMG as out patient, will need close follow up with neurology as an out patient, will schedule   -continued neurological checks, notify neurology with any changes, stat CT of head with any changes in examination  -therapies  -continue to monitor for infectous and metabolic derangements, as per primary team    History of Present Illness     Reason for Consult / Principal Problem:  Headache blurring vision weakness    HPI: Massiel Reyes is a 59 y  o female with past medical history listed below, the patient has a history of type 2 diabetes with long-term use of insulin, with a history of retinopathy and neuropathy  She also has a history of hypertension, anemia, hyperlipidemia, obstructive sleep apnea, obesity, prolonged QT interval, severe left ventricular hypertrophy, aneurysmal ascending aorta vitamin-D deficiency, orthostatic hypertension, asthma  She also has a reported neurological hx of migraines and seizures       Per record review the patient does follow with cardiology - her last visit was in June of 2019 - reviewed records in detail - at that time she was complaining of a few episodes of dizziness/accompanied by headache - it was reported the dizziness felt like her room was spinning  It was also reported that her systolic blood pressures was below 90 during this episode  She follows with endocrinology - for her DM - last visit 8-22  She was seen by her primary care physician - in April of this year - please see reports for details  The patient was recently seen on 09/09/2019 in the ED - for complaints of headache - it was reported the patient was having headache since 09/04/2019 pressure in the entire head, at that time she was also complaining of bilateral arm stiffness - the stiffness started on Saturday - it was reported that she cannot lift her left arm  Patient underwent a CT of the head - which was unremarkable  Her symptoms improved, it was recommended the patient stay for observation stay but she requested discharge at that time  She does have a reported neurology follow up scheduled, on September 20, 2019  The patient presented to the ED once again on September 16, 2019 - per record review - the patient presented with right upper extremity pain swelling and weakness - in the bilateral upper extremities right greater than left, is reports she also had associated migraine evaluation  No neurological imaging was completed at time of arrival      Per the patient - reports she had a history of seizures as a child - described as grand mal as well as absence seizures  She reports she has not take any medications for this, and she has not had an event since childhood  She denies any other neurological history - in particular stroke, traumatic brain injury, demyelinating disease, encephalitis or meningitis  He does report a history of neuropathy      She reports she was diagnosed with migraines - by an ophthalmologist  She reports her headaches started in December 2018, she had no headaches prior to this, she reports this was unusual for her  She felt tired and fatigued  She reports she would have a headache more days than not, they typically come w/ a cluster of 3-4 days and then she will have a a few days without headache and then this will come back  She reports they are located in the frontal region across the front of her head and sometimes go to the back, throbbing and severe  It appears that each episode these become worse  She has associated symptoms of decreased vision - blurriness, positive light and sound sensitivity  She does not take any medication for this, but rest      She reports she took time off from work secondary to these symptoms  She also reports that she has dizziness - she thought at 1st these were not associated with her headaches, however now she believes they may be  It typically comes on when she goes up and down the stairs - she will have to stop - she will have pressure in her head, as well as dizziness and seeing spots -followed by worsening headache  She reports she has had no recent illness, no recent night sweats or weight loss  She reports she has a history of sleep apnea - she did get a new mask however did not utilize this yet  She reports she came to the ED on 09/09 - she reports she recently started work, she began September  She has been working at [employer], she reports this has been a strain on her eyes, and she developed a headache similar to the headaches in the past   She reports this went on for 3 days - Friday and Saturday where she had to take sleep to improve her symptoms  She reported on Sunday she tried to get out of bed and she was stiff in her shoulders right greater than left, shoulder girdle weakness  She reported to the ED and her ha improved with treatment but she continued to have shoulder girdle weakness and stiffness     She was discharged from the hospital     She reports that the symptoms persisted as an outpatient, at which time she presented back to the hospital for re-evaluation, on advice of her - primary care doctor  She reports that she has been having pain in her shoulder girdle region as well history of bilateral arms, weakness, and swelling  She reports this is worse on the right side than left, she reports that she has decreased range of motion in her shoulders upper extremities, as well in her hands  They become very edematous, as well as painful  She reports her headache has been better and improved, she may have had mild ha in the beginning of this admission  She denies any recent chills weight loss, neck stiffness, or travel outside the country  She reported there was a fever documented on arrival, however, does not feel fever or chills  Any neck pain radiating down into her arms or legs, any bowel or bladder incontinence, currently she denies any headache - stiff neck, temporal pain or jaw claudication symptoms - however she reports she occasionally has temporal pain and jaw pain - 1 week ago had jaw pain  She denies any problems with her gait  She denies headache, light sensitivity, sound sensitivity, new visual complaints - chronic visual complaints from her retinopathy, she is currently denies any temporal pain or jaw claudication, neck stiffness, one-sided weakness numbness or tingling, lower extremity weakness, bowel or bladder incontinence, torso sensory loss  She denies any worsening problems her gait  Her biggest concern this morning his her weakness, swelling and decreased ROM in the bilateral upper extremities, right greater than left      CBC on arrival did show a leukocytosis of 14 7, as well as a hemoglobin 9 5  CMP - revealed a sodium of 135, BUN of 26, glucose of 350, AST 64/ALT of 59 alk-phos 325  BNP was normal  Troponin was normal  TSH was 1 710  Sed rate was greater than 130, repeat sed rate 127  Uric acid was normal  Blood culture showed no growth at 72 hours   CK was normal  Rheumatoid factor was negative  JUAN is pending  Lyme is pending  Lupus anticoagulant is pending  anca is pending  Cyclic citrul peptide antibody IGG pending    Chest x-ray - showed no acute cardiopulmonary disease  Vas upper limb venous duplex showed no evidence of acute thrombus  CTA upper extremity right with without contrast showed normal CTA of the right upper extremity  Ultrasound of gallbladder showed hepatomegaly - cholelithiasis    Inpatient consult to Neurology  Consult performed by: Jim Mancini PA-C  Consult ordered by: Cleta Moritz, MD        Review of Systems   Constitutional: Positive for fatigue  Negative for activity change and appetite change  HENT: Negative for congestion, dental problem, drooling, ear pain, facial swelling, trouble swallowing and voice change  Eyes: Positive for visual disturbance  Respiratory: Negative  Cardiovascular: Positive for leg swelling  Gastrointestinal: Negative  Genitourinary: Negative  Musculoskeletal: Positive for arthralgias  Negative for gait problem, myalgias, neck pain and neck stiffness  Skin: Negative  Neurological: Positive for dizziness, weakness, light-headedness, numbness and headaches  Negative for tremors, seizures, syncope, facial asymmetry and speech difficulty  Hematological: Negative        Historical Information   Past Medical History:   Diagnosis Date    Anemia     Arthritis     Diabetes mellitus ([address] )     Dyspnea on exertion     Hyperlipidemia     Hypertension     Legally blind 2010    Mild intermittent asthma with exacerbation 8/4/2018    Miscarriage     x 3    Seizures (HCC)     Sickle cell trait ([address] )     Sleep apnea      Past Surgical History:   Procedure Laterality Date    CATARACT EXTRACTION Bilateral     august and september    EYE SURGERY      HYSTERECTOMY      39    OOPHORECTOMY Left     39     Social History Social History     Substance and Sexual Activity   Alcohol Use Never    Frequency: Never     Social History     Substance and Sexual Activity   Drug Use No     Social History     Tobacco Use   Smoking Status Never Smoker   Smokeless Tobacco Never Used     Family History:   Family History   Problem Relation Age of Onset    Heart attack Mother     Heart disease Mother     Hypertension Mother     No Known Problems Father     Heart disease Sister     No Known Problems Brother     No Known Problems Son     No Known Problems Daughter     Heart attack Maternal Grandmother     Heart disease Maternal Grandmother     Diabetes Other     Heart attack Other     No Known Problems Sister     No Known Problems Sister     No Known Problems Paternal Aunt     No Known Problems Son     Cancer Neg Hx     Stroke Neg Hx      Review of previous medical records was completed, please HPI for details       Meds/Allergies   all current active meds have been reviewed, current meds:   Current Facility-Administered Medications   Medication Dose Route Frequency    acetaminophen (TYLENOL) tablet 650 mg  650 mg Oral Q6H PRN    albuterol (PROVENTIL HFA,VENTOLIN HFA) inhaler 2 puff  2 puff Inhalation Q6H PRN    aspirin chewable tablet 81 mg  81 mg Oral Daily    atorvastatin (LIPITOR) tablet 40 mg  40 mg Oral Daily    budesonide-formoterol (SYMBICORT) 80-4 5 MCG/ACT inhaler 2 puff  2 puff Inhalation BID    diltiazem (CARDIZEM CD) 24 hr capsule 420 mg  420 mg Oral Daily    enoxaparin (LOVENOX) subcutaneous injection 40 mg  40 mg Subcutaneous Q24H RONI    gabapentin (NEURONTIN) capsule 100 mg  100 mg Oral BID    insulin glargine (LANTUS) subcutaneous injection 35 Units 0 35 mL  35 Units Subcutaneous Q12H RONI    insulin lispro (HumaLOG) 100 units/mL subcutaneous injection 1-6 Units  1-6 Units Subcutaneous HS    insulin lispro (HumaLOG) 100 units/mL subcutaneous injection 18 Units  18 Units Subcutaneous TID With Meals    insulin lispro (HumaLOG) 100 units/mL subcutaneous injection 2-12 Units  2-12 Units Subcutaneous TID AC    insulin regular (HumuLIN R,NovoLIN R) injection 5 Units  5 Units Subcutaneous Once    losartan (COZAAR) tablet 100 mg  100 mg Oral Daily    morphine (PF) 4 mg/mL injection 4 mg  4 mg Intravenous Q4H PRN    oxyCODONE (ROXICODONE) IR tablet 5 mg  5 mg Oral Q6H PRN    sodium chloride 0 9 % infusion  125 mL/hr Intravenous Continuous    and PTA meds:   Prior to Admission Medications   Prescriptions Last Dose Informant Patient Reported? Taking? BASAGLAR KWIKPEN 100 units/mL injection pen   No No   Si units twice per day   SARAH MICROLET LANCETS lancets  Self Yes No   Sig: Inject 1 applicator into the skin 4 (four) times a day   Blood Glucose Monitoring Suppl (CONTOUR NEXT MONITOR) w/Device KIT   No No   Sig: Disp 1 meter dx E11 9, may submitted any contour next meter  If not covered let us know we can change strips   Blood Pressure Monitor KIT  Self No No   Sig: Check blood pressures BID   CONTOUR NEXT TEST test strip   No No   Sig: Test blood sugar 3x per day dx E11 9   Cholecalciferol (VITAMIN D3) 3000 units TABS  Self Yes No   Sig: Take by mouth daily    Insulin Pen Needle (BD PEN NEEDLE DERRICK U/F) 32G X 4 MM MISC  Self Yes No   Sig: Inject 1 applicator under the skin 4 (four) times a day   albuterol (PROVENTIL HFA) 90 mcg/act inhaler  Self No No   Sig: Inhale 2 puffs every 6 (six) hours as needed for wheezing For another 24 hours use every 4 hours standing   aspirin 81 MG tablet  Self Yes No   Sig: Take 1 tablet by mouth daily   atorvastatin (LIPITOR) 40 mg tablet   No No   Sig: Take 1 tablet (40 mg total) by mouth daily   budesonide-formoterol (SYMBICORT) 80-4 5 MCG/ACT inhaler  Self No No   Sig: Inhale 2 puffs 2 (two) times a day Rinse mouth after use  Patient taking differently: Inhale 2 puffs as needed Rinse mouth after use     chlorthalidone 25 mg tablet  Self No No   Sig: Take 1 tablet (25 mg total) by mouth daily   diltiazem (TIAZAC) 420 MG 24 hr capsule  Self No No   Sig: Take 1 capsule (420 mg total) by mouth daily   gabapentin (NEURONTIN) 100 mg capsule  Self No No   Sig: Take 1 capsule (100 mg total) by mouth 2 (two) times a day   insulin lispro (HUMALOG KWIKPEN) 100 units/mL injection pen   No No   Si units before meals or 40 if BG over 200   losartan (COZAAR) 100 MG tablet  Self No No   Sig: Take 1 tablet (100 mg total) by mouth daily   metFORMIN (GLUCOPHAGE) 500 mg tablet   No No   Sig: Take 2 tablets (1,000 mg total) by mouth 2 (two) times a day with meals for 90 days 2 tabs daily with supper      Facility-Administered Medications: None     No Known Allergies    Objective   Vitals:Blood pressure 147/74, pulse 84, temperature 97 9 °F (36 6 °C), temperature source Temporal, resp  rate 20, height 5' 8" (1 727 m), weight (!) 137 kg (301 lb 9 4 oz), SpO2 98 %, not currently breastfeeding  ,Body mass index is 45 86 kg/m²  Intake/Output Summary (Last 24 hours) at 2019 0726  Last data filed at 2019 0119  Gross per 24 hour   Intake 8030 42 ml   Output 1300 ml   Net 6730 42 ml     Invasive Devices: Invasive Devices     Peripheral Intravenous Line            Peripheral IV 19 Dorsal (posterior); Left Forearm 1 day              Physical Exam  Neurologic Exam    Vital signs reviewed   Constitutional - in NAD, sitting in bed in no acute distress, cooperative and pleasant  HEENT - NC/AT, no icterus noted, conjuctiva clear, oral mucosa free of exudate, no meningismus - no stiff neck full range of motion and neck region  Cardiac - No murmur noted, rate regular  Lungs - Clear to auscultation bilaterally, no wheezing noted    Abdomen - Soft and non tender, non distended  Extremities - + ace wraps bilateral upper extremities, + swelling and decreased rom in bilateral upper extremities, + swelling in bilateral lower extremities  Skin - no rashes noted    Neurological    Mental status - the patient is awake alert oriented x 3, with no evidence of aphasia or dysarthria, patient is able to follow simple commands, is able to follow complex commands  No paraphasic errors noted  She is pleasant and cooperative  Normal attention and concentration  She is able to read and repeat, she is able to do simple calculations, she is able to spell world forwards and backwards, she is able to tell me current events  Mental status is deemed normal      Cranial nerves 2 through 12 are intact - pupils are equal and reactive, extraocular movements are intact without any dysconjugate gaze or gaze preference, no facial droop is noted, tongue is midline, visual fields are intact to confrontation, sternocleidomastoid status are weak bilaterally    Motor - limited range of motion of the shoulder girdle right greater than left, she is unable to lift her arms above her head bilaterally - she appears to have fairly good power in the bilateral upper extremities 5-/5 bilaterally, appears weaker on the right  4/5 biceps triceps,  1-2 on the right, 1-2 on the left  Unable to perform wrist flexion extension  Rapid altered movements cannot be performed bilaterally  There was pain to palpitation and with movement  She was able to lift bilateral upper extremities off the bed without drift  Lowers were full power 5/5  Normal tone normal bulk  No tremor noted in upper with outstretched hands    Sensation - nonfocal to touch, or temperature no gross neglect appreciated, she did have stocking distrubution sensory loss in the lowers, decreased vibratory sense in the bilateral lowers  Coordination -  no ataxia noted on finger-to-nose testing    Reflexes are equal - decreased through out  Toes are downgoing bilaterally    No evidence of clonus or myoclonus or tremor  No evidence of seizure activity - no starring spells, no tremor      Lab Results:     Recent Results (from the past 24 hour(s))   Fingerstick Glucose (POCT)    Collection Time: 09/19/19 11:02 AM   Result Value Ref Range    POC Glucose 294 (H) 65 - 140 mg/dl   Fingerstick Glucose (POCT)    Collection Time: 09/19/19  3:55 PM   Result Value Ref Range    POC Glucose 350 (H) 65 - 140 mg/dl   Fingerstick Glucose (POCT)    Collection Time: 09/19/19  9:53 PM   Result Value Ref Range    POC Glucose 226 (H) 65 - 140 mg/dl   Fingerstick Glucose (POCT)    Collection Time: 09/20/19  5:54 AM   Result Value Ref Range    POC Glucose 194 (H) 65 - 140 mg/dl     Per radiology interpretation -    "  Procedure: Us Gallbladder    Result Date: 9/17/2019  Narrative: RIGHT UPPER QUADRANT ULTRASOUND INDICATION:     ABD PAIN, FEVER, NO RECENT SURGERY elevation alk phos and llfts acute  COMPARISON:  None TECHNIQUE:   Real-time ultrasound of the right upper quadrant was performed with a curvilinear transducer with both volumetric sweeps and still imaging techniques  FINDINGS: PANCREAS:  Visualized portions of the pancreas are within normal limits  AORTA AND IVC:  Visualized portions are normal for patient age  LIVER: Size:  Moderately enlarged  The liver measures 20 cm in the midclavicular line  Contour:  Surface contour is smooth  Parenchyma:  Echogenicity and echotexture are within normal limits  No evidence of suspicious mass  Limited imaging of the main portal vein shows it to be patent and hepatopetal   BILIARY: The gallbladder is normal in caliber  No wall thickening or pericholecystic fluid  There are multiple dependent calculi without sludge  No sonographic Mcconnell sign  No intrahepatic biliary dilatation  CBD measures 6 mm  No choledocholithiasis  KIDNEY: Right kidney measures 9 8 x 4 3 cm  Within normal limits  ASCITES:   None  Impression: 1  Hepatomegaly  2   Cholelithiasis   Workstation performed: [id number]     9/9/2019 -    CT of the head without contrast no acute intracranial abnormality - mild chronic small-vessel ischemic changes and volume loss     "  Non neurological imaging noted this admission, to review  Imaging Studies: I have personally reviewed pertinent reports        Code Status: Level 1 - Full Code    Counseling / Coordination of Care

## 2019-09-20 NOTE — UTILIZATION REVIEW
Continued Stay Review    Date: 9/20/2019                         Current Patient Class:  Inpatient   Current Level of Care:  Med Surg     HPI:58 y o  female initially admitted on 9/16/2019  Due to pain and swelling of RUE , transamitis,  prolonged QT    Assessment/Plan:  Pain & swelling of RUE -  Surgery eval no compartment syndrome  Oxycodone for pain - ESR elevated - Rheumatology workup -  Frequent headaches -  Possible temporal artery biopsy  Anemia - to 8 5 today monitor  -  Leukocytosis wbc to 15 50- Noted Ziggy Solum is now swollen and tender similar to RUE which is Improving  Likely  not infectious abx's d/c'd on 9/19       Pertinent Labs/Diagnostic Results:   Results from last 7 days   Lab Units 09/19/19  0537 09/17/19  0524 09/16/19  0945   WBC Thousand/uL 15 50* 13 10* 14 70*   HEMOGLOBIN g/dL 8 5* 8 7* 9 5*   HEMATOCRIT % 26 8* 27 2* 29 2*   PLATELETS Thousands/uL 598* 528* 520*   NEUTROS ABS Thousands/µL  --  9 70* 11 30*   TOTAL NEUT ABS Thousand/uL 11 16*  --   --      Results from last 7 days   Lab Units 09/19/19  0537 09/17/19  0524 09/16/19  0945   SODIUM mmol/L 133* 135* 135*   POTASSIUM mmol/L 4 5 4 1 4 5   CHLORIDE mmol/L 99 95* 92*   CO2 mmol/L 29 33* 34*   ANION GAP mmol/L 5 7 9   BUN mg/dL 12 23 26*   CREATININE mg/dL 0 69 0 84 0 84   EGFR ml/min/1 73sq m 109 87 87   CALCIUM mg/dL 8 6 8 8 9 0     Results from last 7 days   Lab Units 09/19/19  0537 09/17/19  0524 09/16/19  0945   AST U/L 36 45* 64*   ALT U/L 50 57* 59*   ALK PHOS U/L 306* 291* 325*   TOTAL PROTEIN g/dL 7 0 7 1 8 0   ALBUMIN g/dL 3 3 3 5 3 8   TOTAL BILIRUBIN mg/dL 0 60 0 60 1 10     Results from last 7 days   Lab Units 09/20/19  0554 09/19/19  2153 09/19/19  1555 09/19/19  1102 09/19/19  0550 09/18/19  2006 09/18/19  1536 09/18/19  1132 09/18/19  0458 09/17/19 2027   POC GLUCOSE mg/dl 194* 226* 350* 294* 273* 331* 334* 265* 277* 386*     Results from last 7 days   Lab Units 09/19/19  0537 09/17/19  0524 09/16/19  0945   GLUCOSE RANDOM mg/dL 276* 312* 350*       Results from last 7 days   Lab Units 09/16/19  1713   CK TOTAL U/L 55     Results from last 7 days   Lab Units 09/16/19  0945   TROPONIN I ng/mL <0 01       Results from last 7 days   Lab Units 09/16/19  0945   TSH 3RD GENERATON uIU/mL 1 710     Results from last 7 days   Lab Units 09/16/19  0945   NT-PRO BNP pg/mL 136     Results from last 7 days   Lab Units 09/16/19  0945   FERRITIN ng/mL 560*     Results from last 7 days   Lab Units 09/16/19  1835   HEP B S AG  Non-reactive   HEP C AB  Equivocal*   HEP B C IGM  Non-reactive     Results from last 7 days   Lab Units 09/19/19  0537 09/18/19  0539 09/16/19  0945   CRP mg/L >90 0*  --   --    SED RATE mm/hour  --  127* >130*     Results from last 7 days   Lab Units 09/16/19  1201   CLARITY UA  Clear   COLOR UA  Cathi*   SPEC GRAV UA  1 015   PH UA  6 0   GLUCOSE UA mg/dl 250 (1/4%)*   KETONES UA mg/dl Negative   BLOOD UA  Negative   PROTEIN UA mg/dl 30 (1+)*   NITRITE UA  Negative   BILIRUBIN UA  Negative   UROBILINOGEN UA mg/dL 1 0   LEUKOCYTES UA  Negative   WBC UA /hpf 1-2*   RBC UA /hpf None Seen   BACTERIA UA /hpf Occasional   EPITHELIAL CELLS WET PREP /hpf Occasional       Results from last 7 days   Lab Units 09/16/19  1028 09/16/19  0945   BLOOD CULTURE  No Growth at 72 hrs  No Growth at 72 hrs       Results from last 7 days   Lab Units 09/19/19  0537   TOTAL COUNTED  100       Vital Signs:   Date/Time  Temp  Pulse  Resp  BP  MAP (mmHg)  SpO2  O2 Device  Patient Position - Orthostatic VS   09/20/19 0717  97 9 °F (36 6 °C)  84  20  147/74  --  98 %  None (Room air)  Lying   09/20/19 0230  98 7 °F (37 1 °C)  --  --  --  --  --  --  --   09/19/19 2327  99 9 °F (37 7 °C)  89  18  147/75  --  97 %  None (Room air)  Lying   09/19/19 1517  99 5 °F (37 5 °C)  90  20  127/69  --  98 %  None (Room air)  Sitting   09/19/19 0728  98 8 °F (37 1 °C)  90  18  149/69  --  99 %  None (Room air)  Sitting         Medications:   Scheduled Meds:   Current Facility-Administered Medications:  acetaminophen 650 mg Oral Q6H PRN    albuterol 2 puff Inhalation Q6H PRN    aspirin 81 mg Oral Daily    atorvastatin 40 mg Oral Daily    budesonide-formoterol 2 puff Inhalation BID    diltiazem 420 mg Oral Daily    enoxaparin 40 mg Subcutaneous Q24H RONI    gabapentin 100 mg Oral BID    insulin glargine 35 Units Subcutaneous Q12H [address]    insulin lispro 1-6 Units Subcutaneous HS    insulin lispro 18 Units Subcutaneous TID With Meals    insulin lispro 2-12 Units Subcutaneous TID AC    insulin regular 5 Units Subcutaneous Once    losartan 100 mg Oral Daily    morphine injection 4 mg Intravenous Q4H PRN    oxyCODONE 5 mg Oral Q6H PRN     Ancef  D/c'd  - last dose on 9/19  NSS @ 125 ml/hr - d/c'd 9/20 @ 09:36    Discharge Plan:  TBD     Network Utilization Review Department  Phone: [phone]; Fax [phone]  [email or web address]  ATTENTION: Please call with any questions or concerns to [phone]  and carefully listen to the prompts so that you are directed to the right person  Send all requests for admission clinical reviews, approved or denied determinations and any other requests to fax [phone]   All voicemails are confidential

## 2019-09-20 NOTE — PROGRESS NOTES
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, with long-term current use of insulin (HCC)    Hypertension    Anemia    Hyperlipidemia    Prolonged QT interval    Mild intermittent asthma with exacerbation    Frequent headaches    Transaminitis    Morbid obesity ([address] )      Plan:    # Upper ext pain and swelling  - constellation of symptoms seem to point to possible rheumatologic etiology  - ESR, CRP elevation  - s/p CT upper extremity, upper extremity Doppler also negative for DVT  - seen by vascular surgery no sign of compartment syndrome  - also seen per Plastic surgery  - no indications for abx thus now monitoring off  - rheumatologic wkup in process, RF nml, pending C CP, lupus anticoagulant, anchor, JUAN, complements  - Neurology consulted; awaiting input  - will ultimately need rehab upon discharge  - cont supportive care    # HA, seems migrainous in etiology; does not seem consistent with temporal arteritis  - Neurology on board, d/w them no indication to proceed with temporal artery bx at this time    # IDDM w/ uncontrolled, hemoglobin A1c of 14 6  - titrate up lantus to 40U bid, seems she takes up to this dose as outpt  - cont rest of insulin regimen  - cont to monitor    # Morbid obesity  - lifestyle/dietary modification    # Anemia - possibly may have some component of hemodilution had been on IVF at 125cc/hr since admission  - no sign of acute blood loss  - will reorder stool occult  - s/p iron panel, iron sat of 15  - has never had a colonoscopy; I educated her at length that it is imperative to have one given age, iron def; she expressed understanding   Can be done as outpatient    # Mild intermittent asthma  - no sign of exacerbation  - cont sympbicort    # Prolonged QT - avoid offending drugs    # HTN - cont meds    VTE Pharmacologic Prophylaxis:   Pharmacologic: Enoxaparin (Lovenox)  Mechanical VTE Prophylaxis in Place: No    Patient Centered Rounds: I have performed bedside rounds with nursing staff today  Discussions with Specialists or Other Care Team Provider:     Education and Discussions with Family / Patient:  Patient and her family    Time Spent for Care: 30 minutes  More than 50% of total time spent on counseling and coordination of care as described above  Current Length of Stay: 4 day(s)    Current Patient Status: Inpatient   Certification Statement: The patient will continue to require additional inpatient hospital stay due to Pending rheumatologic serology    Discharge Plan / Estimated Discharge Date:  DC planning to rehab when able    Code Status: Level 1 - Full Code      Subjective:   Patient seen and examined at bedside  She reports of slightly improved fine motor movement in her right hand  She is able to live hi R shoulder is a little bit more today  Upper extremity hand and digits still swollen  At this time she has no complaints of headache, and usually it is in the frontal region  No temporal or joint pain  She is afebrile  No acute events overnight per RN    Objective:     Vitals:   Temp (24hrs), Av °F (37 2 °C), Min:97 9 °F (36 6 °C), Max:99 9 °F (37 7 °C)    Temp:  [97 9 °F (36 6 °C)-99 9 °F (37 7 °C)] 97 9 °F (36 6 °C)  HR:  [84-90] 84  Resp:  [18-20] 20  BP: (127-147)/(69-75) 147/74  SpO2:  [97 %-98 %] 98 %  Body mass index is 45 86 kg/m²  Input and Output Summary (last 24 hours): Intake/Output Summary (Last 24 hours) at 2019 1310  Last data filed at 2019 0950  Gross per 24 hour   Intake 7250 42 ml   Output 800 ml   Net 6450 42 ml       Physical Exam:     Physical Exam   Constitutional: She is oriented to person, place, and time   She appears well-developed  Morbidly obese   Neck: Normal range of motion  Cardiovascular: Normal rate, regular rhythm, normal heart sounds and intact distal pulses  Exam reveals no gallop and no friction rub  No murmur heard  Pulmonary/Chest: Effort normal and breath sounds normal  No stridor  No respiratory distress  She has no wheezes  She has no rales  She exhibits no tenderness  Abdominal: Soft  Bowel sounds are normal  She exhibits no distension  There is no tenderness  There is no rebound and no guarding  Musculoskeletal: She exhibits edema and tenderness  Bilateral arms and hands swelling   Neurological: She is alert and oriented to person, place, and time  She displays normal reflexes  No cranial nerve deficit or sensory deficit  She exhibits normal muscle tone  Coordination normal    Skin: Skin is warm and dry  Additional Data:     Labs:    Results from last 7 days   Lab Units 09/19/19  0537 09/17/19  0524   WBC Thousand/uL 15 50* 13 10*   HEMOGLOBIN g/dL 8 5* 8 7*   HEMATOCRIT % 26 8* 27 2*   PLATELETS Thousands/uL 598* 528*   NEUTROS PCT %  --  74*   LYMPHS PCT %  --  14*   LYMPHO PCT % 16*  --    MONOS PCT %  --  7   MONO PCT % 10  --    EOS PCT % 2 4     Results from last 7 days   Lab Units 09/19/19  0537   POTASSIUM mmol/L 4 5   CHLORIDE mmol/L 99   CO2 mmol/L 29   BUN mg/dL 12   CREATININE mg/dL 0 69   CALCIUM mg/dL 8 6   ALK PHOS U/L 306*   ALT U/L 50   AST U/L 36           * I Have Reviewed All Lab Data Listed Above  * Additional Pertinent Lab Tests Reviewed: No New Labs Available For Today    Imaging:    Imaging Reports Reviewed Today Include:   Imaging Personally Reviewed by Myself Includes:      Recent Cultures (last 7 days):     Results from last 7 days   Lab Units 09/16/19  1028 09/16/19  0945   BLOOD CULTURE  No Growth at 72 hrs  No Growth at 72 hrs         Last 24 Hours Medication List:     Current Facility-Administered Medications:  acetaminophen 650 mg Oral Q6H PRN Dylon Darting Sharyle Race, DO   albuterol 2 puff Inhalation Q6H PRN Jimmy Alexander MD   aspirin 81 mg Oral Daily Jimmy Alexander MD   atorvastatin 40 mg Oral Daily Jimmy Alexander MD   budesonide-formoterol 2 puff Inhalation BID Jimmy Alexander MD   diltiazem 420 mg Oral Daily Jimmy Alexander MD   enoxaparin 40 mg Subcutaneous Q24H [address] Jimmy Alexander MD   gabapentin 100 mg Oral BID Jimmy Alexander MD   insulin glargine 35 Units Subcutaneous Q12H [address] Lillian Fox MD   insulin lispro 1-6 Units Subcutaneous HS Lillian Fox MD   insulin lispro 18 Units Subcutaneous TID With Meals Lillian Fox MD   insulin lispro 2-12 Units Subcutaneous TID [facility] Lillian Fox MD   insulin regular 5 Units Subcutaneous Once Clearence Lykens V, PA-C   losartan 100 mg Oral Daily Jimmy Alexander MD   morphine injection 4 mg Intravenous Q4H PRN Rao Jordan DO   oxyCODONE 5 mg Oral Q6H PRN Jimmy Alexander MD        Today, Patient Was Seen By: Becky Owen DO    ** Please Note: This note has been constructed using a voice recognition system   **

## 2019-09-20 NOTE — PLAN OF CARE
Problem: PAIN - ADULT  Goal: Verbalizes/displays adequate comfort level or baseline comfort level  Description  Interventions:  - Encourage patient to monitor pain and request assistance  - Assess pain using appropriate pain scale  - Administer analgesics based on type and severity of pain and evaluate response  - Implement non-pharmacological measures as appropriate and evaluate response  - Consider cultural and social influences on pain and pain management  - Notify physician/advanced practitioner if interventions unsuccessful or patient reports new pain  Outcome: Progressing     Problem: INFECTION - ADULT  Goal: Absence or prevention of progression during hospitalization  Description  INTERVENTIONS:  - Assess and monitor for signs and symptoms of infection  - Monitor lab/diagnostic results  - Monitor all insertion sites, i e  indwelling lines, tubes, and drains  - Monitor endotracheal if appropriate and nasal secretions for changes in amount and color  - [place] appropriate cooling/warming therapies per order  - Administer medications as ordered  - Instruct and encourage patient and family to use good hand hygiene technique  - Identify and instruct in appropriate isolation precautions for identified infection/condition  Outcome: Progressing     Problem: SAFETY ADULT  Goal: Patient will remain free of falls  Description  INTERVENTIONS:  - Assess patient frequently for physical needs  -  Identify cognitive and physical deficits and behaviors that affect risk of falls    -  [place] fall precautions as indicated by assessment   - Educate patient/family on patient safety including physical limitations  - Instruct patient to call for assistance with activity based on assessment  - Modify environment to reduce risk of injury  - Consider OT/PT consult to assist with strengthening/mobility  Outcome: Progressing  Goal: Maintain or return to baseline ADL function  Description  INTERVENTIONS:  -  Assess patient's ability to carry out ADLs; assess patient's baseline for ADL function and identify physical deficits which impact ability to perform ADLs (bathing, care of mouth/teeth, toileting, grooming, dressing, etc )  - Assess/evaluate cause of self-care deficits   - Assess range of motion  - Assess patient's mobility; develop plan if impaired  - Assess patient's need for assistive devices and provide as appropriate  - Encourage maximum independence but intervene and supervise when necessary  - Involve family in performance of ADLs  - Assess for home care needs following discharge   - Consider OT consult to assist with ADL evaluation and planning for discharge  - Provide patient education as appropriate  Outcome: Progressing  Goal: Maintain or return mobility status to optimal level  Description  INTERVENTIONS:  - Assess patient's baseline mobility status (ambulation, transfers, stairs, etc )    - Identify cognitive and physical deficits and behaviors that affect mobility  - Identify mobility aids required to assist with transfers and/or ambulation (gait belt, sit-to-stand, lift, walker, cane, etc )  - [place] fall precautions as indicated by assessment  - Record patient progress and toleration of activity level on Mobility SBAR; progress patient to next Phase/Stage  - Instruct patient to call for assistance with activity based on assessment  - Consider rehabilitation consult to assist with strengthening/weightbearing, etc   Outcome: Progressing     Problem: DISCHARGE PLANNING  Goal: Discharge to home or other facility with appropriate resources  Description  INTERVENTIONS:  - Identify barriers to discharge w/patient and caregiver  - Arrange for needed discharge resources and transportation as appropriate  - Identify discharge learning needs (meds, wound care, etc )  - Arrange for interpretive services to assist at discharge as needed  - Refer to Case Management Department for coordinating discharge planning if the patient needs post-hospital services based on physician/advanced practitioner order or complex needs related to functional status, cognitive ability, or social support system  Outcome: Progressing     Problem: Knowledge Deficit  Goal: Patient/family/caregiver demonstrates understanding of disease process, treatment plan, medications, and discharge instructions  Description  Complete learning assessment and assess knowledge base  Interventions:  - Provide teaching at level of understanding  - Provide teaching via preferred learning methods  Outcome: Progressing     Problem: DISCHARGE PLANNING - CARE MANAGEMENT  Goal: Discharge to post-acute care or home with appropriate resources  Description  INTERVENTIONS:  - Conduct assessment to determine patient/family and health care team treatment goals, and need for post-acute services based on payer coverage, community resources, and patient preferences, and barriers to discharge  - Address psychosocial, clinical, and financial barriers to discharge as identified in assessment in conjunction with the patient/family and health care team  - Arrange appropriate level of post-acute services according to patients   needs and preference and payer coverage in collaboration with the physician and health care team  - Communicate with and update the patient/family, physician, and health care team regarding progress on the discharge plan  - Arrange appropriate transportation to post-acute venues  Outcome: Progressing     Problem: Potential for Falls  Goal: Patient will remain free of falls  Description  INTERVENTIONS:  - Assess patient frequently for physical needs  -  Identify cognitive and physical deficits and behaviors that affect risk of falls    -  [place] fall precautions as indicated by assessment   - Educate patient/family on patient safety including physical limitations  - Instruct patient to call for assistance with activity based on assessment  - Modify environment to reduce risk of injury  - Consider OT/PT consult to assist with strengthening/mobility  Outcome: Progressing

## 2019-09-21 ENCOUNTER — APPOINTMENT (INPATIENT)
Dept: CT IMAGING | Facility: HOSPITAL | Age: 59
DRG: 607 | End: 2019-09-21
Payer: COMMERCIAL

## 2019-09-21 LAB
ALBUMIN SERPL BCP-MCNC: 3.1 G/DL (ref 3–5.2)
ALP SERPL-CCNC: 237 U/L (ref 43–122)
ALT SERPL W P-5'-P-CCNC: 35 U/L (ref 9–52)
ANION GAP SERPL CALCULATED.3IONS-SCNC: 8 MMOL/L (ref 5–14)
AST SERPL W P-5'-P-CCNC: 29 U/L (ref 14–36)
BACTERIA BLD CULT: NORMAL
BACTERIA BLD CULT: NORMAL
BASOPHILS # BLD AUTO: 0.2 THOUSANDS/ΜL (ref 0–0.1)
BASOPHILS NFR BLD AUTO: 2 % (ref 0–1)
BILIRUB SERPL-MCNC: 0.4 MG/DL
BLD SMEAR INTERP: NORMAL
BUN SERPL-MCNC: 14 MG/DL (ref 5–25)
CALCIUM SERPL-MCNC: 9.2 MG/DL (ref 8.4–10.2)
CEA SERPL-MCNC: 1 NG/ML (ref 0–3)
CHLORIDE SERPL-SCNC: 102 MMOL/L (ref 97–108)
CO2 SERPL-SCNC: 31 MMOL/L (ref 22–30)
CREAT SERPL-MCNC: 0.75 MG/DL (ref 0.6–1.2)
DAT POLY-SP REAG RBC QL: NEGATIVE
ENA SS-A AB SER-ACNC: <0.2 AI (ref 0–0.9)
ENA SS-B AB SER-ACNC: <0.2 AI (ref 0–0.9)
EOSINOPHIL # BLD AUTO: 0.5 THOUSAND/ΜL (ref 0–0.4)
EOSINOPHIL NFR BLD AUTO: 3 % (ref 0–6)
ERYTHROCYTE [DISTWIDTH] IN BLOOD BY AUTOMATED COUNT: 15.3 %
ERYTHROCYTE [SEDIMENTATION RATE] IN BLOOD: 52 MM/HOUR (ref 0–20)
FOLATE SERPL-MCNC: 4.5 NG/ML (ref 3.1–17.5)
GFR SERPL CREATININE-BSD FRML MDRD: 100 ML/MIN/1.73SQ M
GLUCOSE SERPL-MCNC: 145 MG/DL (ref 70–99)
GLUCOSE SERPL-MCNC: 178 MG/DL (ref 65–140)
GLUCOSE SERPL-MCNC: 200 MG/DL (ref 65–140)
GLUCOSE SERPL-MCNC: 225 MG/DL (ref 65–140)
GLUCOSE SERPL-MCNC: 310 MG/DL (ref 65–140)
HCT VFR BLD AUTO: 26.1 % (ref 36–46)
HEMOCCULT STL QL: NEGATIVE
HEMOCCULT STL QL: NEGATIVE
HGB BLD-MCNC: 8.2 G/DL (ref 12–16)
IGA SERPL-MCNC: 152 MG/DL (ref 70–400)
IGG SERPL-MCNC: 1160 MG/DL (ref 700–1600)
IGM SERPL-MCNC: 122 MG/DL (ref 40–230)
LDH SERPL-CCNC: 460 U/L (ref 313–618)
LYMPHOCYTES # BLD AUTO: 2.3 THOUSANDS/ΜL (ref 0.5–4)
LYMPHOCYTES NFR BLD AUTO: 15 % (ref 25–45)
MCH RBC QN AUTO: 24 PG (ref 26–34)
MCHC RBC AUTO-ENTMCNC: 31.6 G/DL (ref 31–36)
MCV RBC AUTO: 76 FL (ref 80–100)
MONOCYTES # BLD AUTO: 0.6 THOUSAND/ΜL (ref 0.2–0.9)
MONOCYTES NFR BLD AUTO: 4 % (ref 1–10)
NEUTROPHILS # BLD AUTO: 11.6 THOUSANDS/ΜL (ref 1.8–7.8)
NEUTS SEG NFR BLD AUTO: 76 % (ref 45–65)
PLATELET # BLD AUTO: 670 THOUSANDS/UL (ref 150–450)
PMV BLD AUTO: 7.9 FL (ref 8.9–12.7)
POTASSIUM SERPL-SCNC: 4 MMOL/L (ref 3.6–5)
PROT SERPL-MCNC: 6.6 G/DL (ref 5.9–8.4)
RBC # BLD AUTO: 3.43 MILLION/UL (ref 4–5.2)
SODIUM SERPL-SCNC: 141 MMOL/L (ref 137–147)
VIT B12 SERPL-MCNC: 1049 PG/ML (ref 100–900)
WBC # BLD AUTO: 15.2 THOUSAND/UL (ref 4.5–11)

## 2019-09-21 PROCEDURE — 82607 VITAMIN B-12: CPT | Performed by: PSYCHIATRY & NEUROLOGY

## 2019-09-21 PROCEDURE — 82746 ASSAY OF FOLIC ACID SERUM: CPT | Performed by: NURSE PRACTITIONER

## 2019-09-21 PROCEDURE — 82272 OCCULT BLD FECES 1-3 TESTS: CPT | Performed by: FAMILY MEDICINE

## 2019-09-21 PROCEDURE — 83020 HEMOGLOBIN ELECTROPHORESIS: CPT | Performed by: NURSE PRACTITIONER

## 2019-09-21 PROCEDURE — 86255 FLUORESCENT ANTIBODY SCREEN: CPT | Performed by: PSYCHIATRY & NEUROLOGY

## 2019-09-21 PROCEDURE — 82784 ASSAY IGA/IGD/IGG/IGM EACH: CPT | Performed by: NURSE PRACTITIONER

## 2019-09-21 PROCEDURE — 71260 CT THORAX DX C+: CPT

## 2019-09-21 PROCEDURE — 82668 ASSAY OF ERYTHROPOIETIN: CPT | Performed by: NURSE PRACTITIONER

## 2019-09-21 PROCEDURE — 80053 COMPREHEN METABOLIC PANEL: CPT | Performed by: NURSE PRACTITIONER

## 2019-09-21 PROCEDURE — 99233 SBSQ HOSP IP/OBS HIGH 50: CPT | Performed by: INTERNAL MEDICINE

## 2019-09-21 PROCEDURE — 82948 REAGENT STRIP/BLOOD GLUCOSE: CPT

## 2019-09-21 PROCEDURE — 74177 CT ABD & PELVIS W/CONTRAST: CPT

## 2019-09-21 PROCEDURE — 85652 RBC SED RATE AUTOMATED: CPT | Performed by: PSYCHIATRY & NEUROLOGY

## 2019-09-21 PROCEDURE — 82232 ASSAY OF BETA-2 PROTEIN: CPT | Performed by: NURSE PRACTITIONER

## 2019-09-21 PROCEDURE — 83883 ASSAY NEPHELOMETRY NOT SPEC: CPT | Performed by: NURSE PRACTITIONER

## 2019-09-21 PROCEDURE — 86225 DNA ANTIBODY NATIVE: CPT | Performed by: PSYCHIATRY & NEUROLOGY

## 2019-09-21 PROCEDURE — 83010 ASSAY OF HAPTOGLOBIN QUANT: CPT | Performed by: NURSE PRACTITIONER

## 2019-09-21 PROCEDURE — 86880 COOMBS TEST DIRECT: CPT | Performed by: NURSE PRACTITIONER

## 2019-09-21 PROCEDURE — 83615 LACTATE (LD) (LDH) ENZYME: CPT | Performed by: NURSE PRACTITIONER

## 2019-09-21 PROCEDURE — 85025 COMPLETE CBC W/AUTO DIFF WBC: CPT | Performed by: NURSE PRACTITIONER

## 2019-09-21 RX ADMIN — LOSARTAN POTASSIUM 100 MG: 50 TABLET ORAL at 12:06

## 2019-09-21 RX ADMIN — Medication 200 MG: at 12:07

## 2019-09-21 RX ADMIN — INSULIN LISPRO 5 UNITS: 100 INJECTION, SOLUTION INTRAVENOUS; SUBCUTANEOUS at 22:00

## 2019-09-21 RX ADMIN — INSULIN LISPRO 4 UNITS: 100 INJECTION, SOLUTION INTRAVENOUS; SUBCUTANEOUS at 12:09

## 2019-09-21 RX ADMIN — BUDESONIDE AND FORMOTEROL FUMARATE DIHYDRATE 2 PUFF: 80; 4.5 AEROSOL RESPIRATORY (INHALATION) at 20:45

## 2019-09-21 RX ADMIN — GABAPENTIN 100 MG: 100 CAPSULE ORAL at 12:06

## 2019-09-21 RX ADMIN — INSULIN GLARGINE 40 UNITS: 100 INJECTION, SOLUTION SUBCUTANEOUS at 21:57

## 2019-09-21 RX ADMIN — INSULIN LISPRO 4 UNITS: 100 INJECTION, SOLUTION INTRAVENOUS; SUBCUTANEOUS at 17:13

## 2019-09-21 RX ADMIN — DILTIAZEM HYDROCHLORIDE 420 MG: 240 CAPSULE, EXTENDED RELEASE ORAL at 12:05

## 2019-09-21 RX ADMIN — ENOXAPARIN SODIUM 40 MG: 40 INJECTION SUBCUTANEOUS at 12:07

## 2019-09-21 RX ADMIN — GABAPENTIN 100 MG: 100 CAPSULE ORAL at 17:12

## 2019-09-21 RX ADMIN — BUDESONIDE AND FORMOTEROL FUMARATE DIHYDRATE 2 PUFF: 80; 4.5 AEROSOL RESPIRATORY (INHALATION) at 08:17

## 2019-09-21 RX ADMIN — ASPIRIN 81 MG 81 MG: 81 TABLET ORAL at 12:06

## 2019-09-21 RX ADMIN — IOHEXOL 100 ML: 350 INJECTION, SOLUTION INTRAVENOUS at 11:50

## 2019-09-21 RX ADMIN — ATORVASTATIN CALCIUM 40 MG: 40 TABLET, FILM COATED ORAL at 12:06

## 2019-09-21 NOTE — ASSESSMENT & PLAN NOTE
· qtc 487-   · Avoid QT prolonging medications    · Denies any cardiac symptoms including palpitations chest pain shortness of breath exertion

## 2019-09-21 NOTE — PROGRESS NOTES
Progress Note - Amandeep Kauffman 1960, 59 y o  female MRN: [id number]    Unit/Bed#:  -01 Encounter: [id number]    Primary Care Provider: Rose Gutierrez MD   Date and time admitted to hospital: 9/16/2019  9:32 AM        * Pain and swelling of right upper extremity  Assessment & Plan  · Progressively getting worse for past week- with weakness- she reports weakness in b/l upper extremities but right worse then left   Unknown etiology at this time   · She has wbc and thrombocytosis   · CT of the upper extremity is negative for any acute pathology  · Upper extremity Doppler is negative for any acute dvt  · Patient was initially started on antibiotics but since she developed similar symptoms on the other side antibiotics were discontinued likely Rheumatology in nature  Rheumatology workup in progress  · Vascular surgery evaluation appreciated  Further Following workup has been initiated :  - constellation of symptoms seem to point to possible rheumatologic etiology  - ESR, CRP elevation  - s/p CT upper extremity, upper extremity Doppler also negative for DVT  - seen by vascular surgery no sign of compartment syndrome  - also  to be seen per Plastic surgery/consult pt  - no indications for abx thus now monitoring off no fever no chills no signs of infection  - rheumatologic wkup in process, RF nml, pending C CP, lupus anticoagulant, anchor, JUAN, complements    Morbid obesity ([address] )  Assessment & Plan  · TLC as an outpatient  · Further to discuss with PCP    Transaminitis  Assessment & Plan  · Alkaline phosphatase and ALT and AST improving  Status post ultrasound the official read is pending  · Repeat CMP    Frequent headaches  Assessment & Plan  · Sounds like she has migraines , patient is c/o daily headaches since December  Patient is also complaining of blurry vision that has been also going since December    · Patient with elevated ESR but the blurry vision and the headache has been stable since December-and showed the patient needed to be started on steroids given the prolonged course of headache and visual changes  · Discussed with vascular surgery to see if she benefit from temporal artery biopsy  · Patient reported that she was supposed to see the Neurology as an outpatient due to headache and vision changes and weakness-neurology consultation pending  · Denies any new neurologic symptoms denies pain with chewing, denies jaw claudication, reports blurry vision has been going on at least a year or so and was evaluated by PCP and was thought to be due to diabetes  No previous history of giant cell arteritis  · Patient reported that today she does not have any pain    Mild intermittent asthma with exacerbation  Assessment & Plan  · Resume home medications of symbicort   · No acute exacerbation  Prolonged QT interval  Assessment & Plan  · qtc 487-   · Avoid QT prolonging medications  · Denies any cardiac symptoms including palpitations chest pain shortness of breath exertion    Hyperlipidemia  Assessment & Plan  · Resume statin   · Transaminitis improved, alkaline phosphatase is up trending  · Monitor  · Obtain hepatitis panel tomorrow  · Ultrasound reviewed    Anemia  Assessment & Plan  · Chronic microcytic but keeps dropping from previous - check iron panel and ferritin , fecal occult- no report of bleeding  · This is most likely anemia of chronic disease  · Denies melena hematochezia hematemesis    Hypertension  Assessment & Plan  · Blood pressure acceptable  Continue with the current care   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, with long-term current use of insulin [facility])  Assessment & Plan  Lab Results   Component Value Date    HGBA1C 14 6 (H) 08/22/2019       Recent Labs     09/20/19  1058 09/20/19  1552 09/20/19 2028 09/21/19  0529   POCGLU 270* 109 183* 178*       Blood Sugar Average: Last 72 hrs:  · Patient seen and examined    Reported that she uses 40 units of Lantus b i d  And 30-35 units of Humalog with meals  · Will adjust the insulin and monitor  · Appropriate consultation in regards to urgently needed lifestyle nutritional modification of been discussed,          VTE Pharmacologic Prophylaxis:   Pharmacologic: Enoxaparin (Lovenox)  Mechanical VTE Prophylaxis in Place: Yes    Patient Centered Rounds: I have performed bedside rounds with nursing staff today  Discussions with Specialists or Other Care Team Provider:  Yes    Education and Discussions with Family / Patient:  Yes    Time Spent for Care: 30 minutes  More than 50% of total time spent on counseling and coordination of care as described above  Current Length of Stay: 5 day(s)    Current Patient Status: Inpatient   Certification Statement: The patient will continue to require additional inpatient hospital stay due to Pending studies/will need acute short-term    Discharge Plan / Estimated Discharge Date: To be determined    Code Status: Level 1 - Full Code      Subjective:   My right arm is better today    Objective:     Vitals:   Temp (24hrs), Av 2 °F (36 8 °C), Min:97 7 °F (36 5 °C), Max:98 5 °F (36 9 °C)    Temp:  [97 7 °F (36 5 °C)-98 5 °F (36 9 °C)] 97 7 °F (36 5 °C)  HR:  [82-90] 82  Resp:  [19-20] 20  BP: (125-145)/(72-77) 140/76  SpO2:  [99 %-100 %] 100 %  Body mass index is 45 86 kg/m²  Input and Output Summary (last 24 hours): Intake/Output Summary (Last 24 hours) at 2019 1012  Last data filed at 2019 0857  Gross per 24 hour   Intake 1260 ml   Output 1200 ml   Net 60 ml       Physical Exam:     Physical Exam    Constitutional:  Well developed, well nourished, no acute distress, non-toxic appearance   Eyes:  PERRL, conjunctiva normal   HENT:  Atraumatic, external ears normal, nose normal, oropharynx moist, no pharyngeal exudates     Neck- normal range of motion, no tenderness, supple   Respiratory:  No respiratory distress, normal breath sounds, no rales, no wheezing   Cardiovascular:  Normal rate, normal rhythm, no murmurs, no gallops, no rubs   GI:  Soft, nondistended, normal bowel sounds, nontender, no organomegaly, no mass, no rebound, no guarding   :  No costovertebral angle tenderness   Musculoskeletal:  No edema, no tenderness, no deformities  Back- no tenderness  Integument:  Well hydrated, no rash   Lymphatic:  No lymphadenopathy noted   Neurologic:  Alert & oriented x 3, CN 2-12 normal, normal motor function, normal sensory function, no focal deficits noted   Psychiatric:  Speech and behavior appropriate             Additional Data:     Labs:    Results from last 7 days   Lab Units 09/21/19  0502   WBC Thousand/uL 15 20*   HEMOGLOBIN g/dL 8 2*   HEMATOCRIT % 26 1*   PLATELETS Thousands/uL 670*   NEUTROS PCT % 76*   LYMPHS PCT % 15*   MONOS PCT % 4   EOS PCT % 3     Results from last 7 days   Lab Units 09/21/19  0502   POTASSIUM mmol/L 4 0   CHLORIDE mmol/L 102   CO2 mmol/L 31*   BUN mg/dL 14   CREATININE mg/dL 0 75   CALCIUM mg/dL 9 2   ALK PHOS U/L 237*   ALT U/L 35   AST U/L 29           * I Have Reviewed All Lab Data Listed Above  * Additional Pertinent Lab Tests Reviewed: [address] Admission Reviewed    Imaging:    Imaging Reports Reviewed Today Include:  Yes  Imaging Personally Reviewed by Myself Includes:  Yes    Recent Cultures (last 7 days):     Results from last 7 days   Lab Units 09/19/19  1250 09/19/19  1249 09/16/19  1028 09/16/19  0945   BLOOD CULTURE  No Growth at 24 hrs  No Growth at 24 hrs  No Growth After 4 Days  No Growth After 4 Days         Last 24 Hours Medication List:     Current Facility-Administered Medications:  acetaminophen 650 mg Oral Q6H PRN Giovanny Levine,    albuterol 2 puff Inhalation Q6H PRN Jose Rosenberg MD   aspirin 81 mg Oral Daily Jose Rosenberg MD   atorvastatin 40 mg Oral Daily Jose Rosenberg MD   barium sulfate 900 mL Oral 90 min pre-procedure Nicola Gist, CRNP   budesonide-formoterol 2 puff Inhalation BID Christina Puente MD   diltiazem 420 mg Oral Daily Christina Puente MD   enoxaparin 40 mg Subcutaneous Q24H [address] Christina Puente MD   gabapentin 100 mg Oral BID Christina Puente MD   insulin glargine 40 Units Subcutaneous Q12H [address] Morse Lav, DO   insulin lispro 1-6 Units Subcutaneous HS Cleta Moritz, MD   insulin lispro 18 Units Subcutaneous TID With Meals Cleta Moritz, MD   insulin lispro 2-12 Units Subcutaneous TID [facility] [place] Cleta Moritz, MD   insulin regular 5 Units Subcutaneous Once Chelly ADAMS PA-C   iron sucrose 200 mg Intravenous Daily CONCETTA Rodrigez   LORazepam 0 5 mg Intravenous Once Morse Lav, DO   losartan 100 mg Oral Daily Christina Puente MD   morphine injection 4 mg Intravenous Q4H PRN Autry Jeans, DO   oxyCODONE 5 mg Oral Q6H PRN Christina Puente MD        Today, Patient Was Seen By: [employer]    ** Please Note: This note has been constructed using a voice recognition system   **

## 2019-09-21 NOTE — ASSESSMENT & PLAN NOTE
· Alkaline phosphatase and ALT and AST improving    Status post ultrasound the official read is pending  · Repeat CMP

## 2019-09-21 NOTE — ASSESSMENT & PLAN NOTE
· Chronic microcytic but keeps dropping from previous - check iron panel and ferritin , fecal occult- no report of bleeding  · This is most likely anemia of chronic disease    · Denies melena hematochezia hematemesis

## 2019-09-21 NOTE — ASSESSMENT & PLAN NOTE
· Progressively getting worse for past week- with weakness- she reports weakness in b/l upper extremities but right worse then left   Unknown etiology at this time   · She has wbc and thrombocytosis   · CT of the upper extremity is negative for any acute pathology  · Upper extremity Doppler is negative for any acute dvt  · Patient was initially started on antibiotics but since she developed similar symptoms on the other side antibiotics were discontinued likely Rheumatology in nature  Rheumatology workup in progress  · Vascular surgery evaluation appreciated    Further Following workup has been initiated :  - constellation of symptoms seem to point to possible rheumatologic etiology  - ESR, CRP elevation  - s/p CT upper extremity, upper extremity Doppler also negative for DVT  - seen by vascular surgery no sign of compartment syndrome  - also to be seen per Plastic surgery/consult pt  - no indications for abx thus now monitoring off no fever no chills no signs of infection  - rheumatologic wkup in process, RF nml, pending C CP, lupus anticoagulant, anchor, JUAN, complements

## 2019-09-21 NOTE — ASSESSMENT & PLAN NOTE
Lab Results   Component Value Date    HGBA1C 14 6 (H) 08/22/2019       Recent Labs     09/20/19  1058 09/20/19  1552 09/20/19 2028 09/21/19  0529   POCGLU 270* 109 183* 178*       Blood Sugar Average: Last 72 hrs:  · Patient seen and examined  Reported that she uses 40 units of Lantus b i d  And 30-35 units of Humalog with meals    · Will adjust the insulin and monitor  · Appropriate consultation in regards to urgently needed lifestyle nutritional modification of been discussed,

## 2019-09-21 NOTE — ASSESSMENT & PLAN NOTE
· Sounds like she has migraines , patient is c/o daily headaches since December  Patient is also complaining of blurry vision that has been also going since December  · Patient with elevated ESR but the blurry vision and the headache has been stable since December-and showed the patient needed to be started on steroids given the prolonged course of headache and visual changes  · Discussed with vascular surgery to see if she benefit from temporal artery biopsy  · Patient reported that she was supposed to see the Neurology as an outpatient due to headache and vision changes and weakness-neurology consultation pending  · Denies any new neurologic symptoms denies pain with chewing, denies jaw claudication, reports blurry vision has been going on at least a year or so and was evaluated by PCP and was thought to be due to diabetes  No previous history of giant cell arteritis    · Patient reported that today she does not have any pain

## 2019-09-22 LAB
ALBUMIN SERPL BCP-MCNC: 3.3 G/DL (ref 3–5.2)
ALP SERPL-CCNC: 235 U/L (ref 43–122)
ALT SERPL W P-5'-P-CCNC: 31 U/L (ref 9–52)
ANION GAP SERPL CALCULATED.3IONS-SCNC: 7 MMOL/L (ref 5–14)
ANISOCYTOSIS BLD QL SMEAR: PRESENT
AST SERPL W P-5'-P-CCNC: 24 U/L (ref 14–36)
B2 GLYCOPROT1 IGA SER-ACNC: <9 GPI IGA UNITS (ref 0–25)
B2 GLYCOPROT1 IGG SER-ACNC: <9 GPI IGG UNITS (ref 0–20)
B2 GLYCOPROT1 IGM SER-ACNC: 12 GPI IGM UNITS (ref 0–32)
B2 MICROGLOB SERPL-MCNC: 2.4 MG/L (ref 0.6–2.4)
BASOPHILS # BLD AUTO: 0.1 THOUSANDS/ΜL (ref 0–0.1)
BASOPHILS NFR BLD AUTO: 1 % (ref 0–1)
BILIRUB SERPL-MCNC: 0.5 MG/DL
BUN SERPL-MCNC: 14 MG/DL (ref 5–25)
CALCIUM SERPL-MCNC: 9.2 MG/DL (ref 8.4–10.2)
CHLORIDE SERPL-SCNC: 101 MMOL/L (ref 97–108)
CK SERPL-CCNC: 36 U/L (ref 30–135)
CO2 SERPL-SCNC: 29 MMOL/L (ref 22–30)
CREAT SERPL-MCNC: 0.7 MG/DL (ref 0.6–1.2)
EOSINOPHIL # BLD AUTO: 0.4 THOUSAND/ΜL (ref 0–0.4)
EOSINOPHIL NFR BLD AUTO: 3 % (ref 0–6)
ERYTHROCYTE [DISTWIDTH] IN BLOOD BY AUTOMATED COUNT: 15.6 %
GFR SERPL CREATININE-BSD FRML MDRD: 109 ML/MIN/1.73SQ M
GLUCOSE SERPL-MCNC: 179 MG/DL (ref 65–140)
GLUCOSE SERPL-MCNC: 187 MG/DL (ref 65–140)
GLUCOSE SERPL-MCNC: 214 MG/DL (ref 65–140)
GLUCOSE SERPL-MCNC: 217 MG/DL (ref 70–99)
GLUCOSE SERPL-MCNC: 228 MG/DL (ref 65–140)
HAPTOGLOB SERPL-MCNC: 660 MG/DL (ref 34–200)
HCT VFR BLD AUTO: 26.8 % (ref 36–46)
HGB BLD-MCNC: 8.4 G/DL (ref 12–16)
HYPERCHROMIA BLD QL SMEAR: PRESENT
LYMPHOCYTES # BLD AUTO: 2.4 THOUSANDS/ΜL (ref 0.5–4)
LYMPHOCYTES NFR BLD AUTO: 19 % (ref 25–45)
MCH RBC QN AUTO: 23.7 PG (ref 26–34)
MCHC RBC AUTO-ENTMCNC: 31.2 G/DL (ref 31–36)
MCV RBC AUTO: 76 FL (ref 80–100)
MONOCYTES # BLD AUTO: 0.6 THOUSAND/ΜL (ref 0.2–0.9)
MONOCYTES NFR BLD AUTO: 5 % (ref 1–10)
NEUTROPHILS # BLD AUTO: 9.5 THOUSANDS/ΜL (ref 1.8–7.8)
NEUTS SEG NFR BLD AUTO: 73 % (ref 45–65)
PLATELET # BLD AUTO: 753 THOUSANDS/UL (ref 150–450)
PLATELET BLD QL SMEAR: ABNORMAL
PMV BLD AUTO: 7.7 FL (ref 8.9–12.7)
POTASSIUM SERPL-SCNC: 4.3 MMOL/L (ref 3.6–5)
PROT SERPL-MCNC: 6.9 G/DL (ref 5.9–8.4)
RBC # BLD AUTO: 3.54 MILLION/UL (ref 4–5.2)
RBC MORPH BLD: ABNORMAL
SODIUM SERPL-SCNC: 137 MMOL/L (ref 137–147)
WBC # BLD AUTO: 13 THOUSAND/UL (ref 4.5–11)

## 2019-09-22 PROCEDURE — 82948 REAGENT STRIP/BLOOD GLUCOSE: CPT

## 2019-09-22 PROCEDURE — 82550 ASSAY OF CK (CPK): CPT | Performed by: INTERNAL MEDICINE

## 2019-09-22 PROCEDURE — 99233 SBSQ HOSP IP/OBS HIGH 50: CPT | Performed by: INTERNAL MEDICINE

## 2019-09-22 PROCEDURE — 80053 COMPREHEN METABOLIC PANEL: CPT | Performed by: INTERNAL MEDICINE

## 2019-09-22 PROCEDURE — 85025 COMPLETE CBC W/AUTO DIFF WBC: CPT | Performed by: INTERNAL MEDICINE

## 2019-09-22 RX ORDER — INSULIN GLARGINE 100 [IU]/ML
50 INJECTION, SOLUTION SUBCUTANEOUS EVERY 12 HOURS SCHEDULED
Status: DISCONTINUED | OUTPATIENT
Start: 2019-09-22 | End: 2019-09-23

## 2019-09-22 RX ADMIN — GABAPENTIN 100 MG: 100 CAPSULE ORAL at 08:04

## 2019-09-22 RX ADMIN — ACETAMINOPHEN 650 MG: 325 TABLET ORAL at 06:24

## 2019-09-22 RX ADMIN — GABAPENTIN 100 MG: 100 CAPSULE ORAL at 17:10

## 2019-09-22 RX ADMIN — INSULIN GLARGINE 50 UNITS: 100 INJECTION, SOLUTION SUBCUTANEOUS at 21:49

## 2019-09-22 RX ADMIN — Medication 200 MG: at 10:51

## 2019-09-22 RX ADMIN — INSULIN LISPRO 2 UNITS: 100 INJECTION, SOLUTION INTRAVENOUS; SUBCUTANEOUS at 16:35

## 2019-09-22 RX ADMIN — INSULIN LISPRO 1 UNITS: 100 INJECTION, SOLUTION INTRAVENOUS; SUBCUTANEOUS at 21:49

## 2019-09-22 RX ADMIN — ATORVASTATIN CALCIUM 40 MG: 40 TABLET, FILM COATED ORAL at 08:04

## 2019-09-22 RX ADMIN — ENOXAPARIN SODIUM 40 MG: 40 INJECTION SUBCUTANEOUS at 10:51

## 2019-09-22 RX ADMIN — INSULIN LISPRO 4 UNITS: 100 INJECTION, SOLUTION INTRAVENOUS; SUBCUTANEOUS at 12:04

## 2019-09-22 RX ADMIN — ASPIRIN 81 MG 81 MG: 81 TABLET ORAL at 08:04

## 2019-09-22 RX ADMIN — BUDESONIDE AND FORMOTEROL FUMARATE DIHYDRATE 2 PUFF: 80; 4.5 AEROSOL RESPIRATORY (INHALATION) at 21:49

## 2019-09-22 RX ADMIN — DILTIAZEM HYDROCHLORIDE 420 MG: 240 CAPSULE, EXTENDED RELEASE ORAL at 08:03

## 2019-09-22 RX ADMIN — INSULIN GLARGINE 40 UNITS: 100 INJECTION, SOLUTION SUBCUTANEOUS at 08:08

## 2019-09-22 RX ADMIN — INSULIN LISPRO 4 UNITS: 100 INJECTION, SOLUTION INTRAVENOUS; SUBCUTANEOUS at 06:18

## 2019-09-22 RX ADMIN — LOSARTAN POTASSIUM 100 MG: 50 TABLET ORAL at 08:04

## 2019-09-22 RX ADMIN — BUDESONIDE AND FORMOTEROL FUMARATE DIHYDRATE 2 PUFF: 80; 4.5 AEROSOL RESPIRATORY (INHALATION) at 08:09

## 2019-09-22 NOTE — NURSING NOTE
Pt OOB in chair eating breakfast  Denies pain or shortness of breath  BL hands swelling present with much improvement per patient  Pt does report increased ability to close hands and increased functionality  Reports good appetite, denies N/V/D  Call bell within reach

## 2019-09-22 NOTE — ASSESSMENT & PLAN NOTE
· qtc 487  · Avoid QT prolonging medications    · Denies any cardiac symptoms including palpitations chest pain shortness of breath exertion

## 2019-09-22 NOTE — ASSESSMENT & PLAN NOTE
· Progressively getting worse for past week- with weakness- she reports weakness that continues to improve  in b/l upper extremities but right worse then left   Unknown etiology at this time   Today she reports some of her symptoms improving especially bilateral upper extremity pain  · She has wbc and thrombocytosis   · CT of the upper extremity is negative for any acute pathology  · Upper extremity Doppler is negative for any acute dvt  · Patient was initially started on antibiotics but since she developed similar symptoms on the other side antibiotics were discontinued likely Rheumatology in nature  Rheumatology workup in progress  · Vascular surgery evaluation appreciated  Further Following workup has been initiated :  - constellation of symptoms seem to point to possible rheumatologic etiology     - ESR, CRP elevation  - s/p CT upper extremity, upper extremity Doppler also negative for DVT  - seen by vascular surgery no sign of compartment syndrome  - also to be seen per Plastic surgery/consult pt  - no indications for abx thus now monitoring off no fever no chills no signs of infection  - rheumatologic wkup in process, RF nml, pending C CP, lupus anticoagulant, anchor, JUAN, complements

## 2019-09-22 NOTE — ASSESSMENT & PLAN NOTE
Lab Results   Component Value Date    HGBA1C 14 6 (H) 08/22/2019       Recent Labs     09/21/19  1127 09/21/19  1600 09/21/19 2018 09/22/19  0548   POCGLU 200* 225* 310* 228*       Blood Sugar Average: Last 72 hrs:  · Reported that she uses 40 units of Lantus b i d  And 30-35 units of Humalog with meals    I have increase Lantus to 50 unit for tonight  · Will adjust the insulin and monitor  · Appropriate consultation in regards to urgently needed lifestyle nutritional modification of been discussed,

## 2019-09-22 NOTE — NURSING NOTE
Pt resting comfortably in bed  AAO x4  Denies any  pain  Lung clear/diminshed bilaterally  Denies chest pain/SOB  Vitals stable  All needs and call bell within reach of patient  Will continue to monitor

## 2019-09-22 NOTE — PLAN OF CARE
Problem: PAIN - ADULT  Goal: Verbalizes/displays adequate comfort level or baseline comfort level  Description  Interventions:  - Encourage patient to monitor pain and request assistance  - Assess pain using appropriate pain scale  - Administer analgesics based on type and severity of pain and evaluate response  - Implement non-pharmacological measures as appropriate and evaluate response  - Consider cultural and social influences on pain and pain management  - Notify physician/advanced practitioner if interventions unsuccessful or patient reports new pain  Outcome: Progressing     Problem: INFECTION - ADULT  Goal: Absence or prevention of progression during hospitalization  Description  INTERVENTIONS:  - Assess and monitor for signs and symptoms of infection  - Monitor lab/diagnostic results  - Monitor all insertion sites, i e  indwelling lines, tubes, and drains  - Monitor endotracheal if appropriate and nasal secretions for changes in amount and color  - [place] appropriate cooling/warming therapies per order  - Administer medications as ordered  - Instruct and encourage patient and family to use good hand hygiene technique  - Identify and instruct in appropriate isolation precautions for identified infection/condition  Outcome: Progressing     Problem: SAFETY ADULT  Goal: Patient will remain free of falls  Description  INTERVENTIONS:  - Assess patient frequently for physical needs  -  Identify cognitive and physical deficits and behaviors that affect risk of falls    -  [place] fall precautions as indicated by assessment   - Educate patient/family on patient safety including physical limitations  - Instruct patient to call for assistance with activity based on assessment  - Modify environment to reduce risk of injury  - Consider OT/PT consult to assist with strengthening/mobility  Outcome: Progressing  Goal: Maintain or return to baseline ADL function  Description  INTERVENTIONS:  -  Assess patient's ability to carry out ADLs; assess patient's baseline for ADL function and identify physical deficits which impact ability to perform ADLs (bathing, care of mouth/teeth, toileting, grooming, dressing, etc )  - Assess/evaluate cause of self-care deficits   - Assess range of motion  - Assess patient's mobility; develop plan if impaired  - Assess patient's need for assistive devices and provide as appropriate  - Encourage maximum independence but intervene and supervise when necessary  - Involve family in performance of ADLs  - Assess for home care needs following discharge   - Consider OT consult to assist with ADL evaluation and planning for discharge  - Provide patient education as appropriate  Outcome: Progressing  Goal: Maintain or return mobility status to optimal level  Description  INTERVENTIONS:  - Assess patient's baseline mobility status (ambulation, transfers, stairs, etc )    - Identify cognitive and physical deficits and behaviors that affect mobility  - Identify mobility aids required to assist with transfers and/or ambulation (gait belt, sit-to-stand, lift, walker, cane, etc )  - [place] fall precautions as indicated by assessment  - Record patient progress and toleration of activity level on Mobility SBAR; progress patient to next Phase/Stage  - Instruct patient to call for assistance with activity based on assessment  - Consider rehabilitation consult to assist with strengthening/weightbearing, etc   Outcome: Progressing     Problem: DISCHARGE PLANNING  Goal: Discharge to home or other facility with appropriate resources  Description  INTERVENTIONS:  - Identify barriers to discharge w/patient and caregiver  - Arrange for needed discharge resources and transportation as appropriate  - Identify discharge learning needs (meds, wound care, etc )  - Arrange for interpretive services to assist at discharge as needed  - Refer to Case Management Department for coordinating discharge planning if the patient needs post-hospital services based on physician/advanced practitioner order or complex needs related to functional status, cognitive ability, or social support system  Outcome: Progressing     Problem: Knowledge Deficit  Goal: Patient/family/caregiver demonstrates understanding of disease process, treatment plan, medications, and discharge instructions  Description  Complete learning assessment and assess knowledge base  Interventions:  - Provide teaching at level of understanding  - Provide teaching via preferred learning methods  Outcome: Progressing     Problem: DISCHARGE PLANNING - CARE MANAGEMENT  Goal: Discharge to post-acute care or home with appropriate resources  Description  INTERVENTIONS:  - Conduct assessment to determine patient/family and health care team treatment goals, and need for post-acute services based on payer coverage, community resources, and patient preferences, and barriers to discharge  - Address psychosocial, clinical, and financial barriers to discharge as identified in assessment in conjunction with the patient/family and health care team  - Arrange appropriate level of post-acute services according to patients   needs and preference and payer coverage in collaboration with the physician and health care team  - Communicate with and update the patient/family, physician, and health care team regarding progress on the discharge plan  - Arrange appropriate transportation to post-acute venues  Outcome: Progressing     Problem: Potential for Falls  Goal: Patient will remain free of falls  Description  INTERVENTIONS:  - Assess patient frequently for physical needs  -  Identify cognitive and physical deficits and behaviors that affect risk of falls    -  [place] fall precautions as indicated by assessment   - Educate patient/family on patient safety including physical limitations  - Instruct patient to call for assistance with activity based on assessment  - Modify environment to reduce risk of injury  - Consider OT/PT consult to assist with strengthening/mobility  Outcome: Progressing

## 2019-09-22 NOTE — PROGRESS NOTES
Progress Note - Emi Marsh 1960, 59 y o  female MRN: [id number]    Unit/Bed#:  -01 Encounter: [id number]    Primary Care Provider: Janna Levin MD   Date and time admitted to hospital: 9/16/2019  9:32 AM        * Pain and swelling of right upper extremity  Assessment & Plan  · Progressively getting worse for past week- with weakness- she reports weakness that continues to improve  in b/l upper extremities but right worse then left   Unknown etiology at this time   Today she reports some of her symptoms improving especially bilateral upper extremity pain  · She has wbc and thrombocytosis   · CT of the upper extremity is negative for any acute pathology  · Upper extremity Doppler is negative for any acute dvt  · Patient was initially started on antibiotics but since she developed similar symptoms on the other side antibiotics were discontinued likely Rheumatology in nature  Rheumatology workup in progress  · Vascular surgery evaluation appreciated  Further Following workup has been initiated :  - constellation of symptoms seem to point to possible rheumatologic etiology     - ESR, CRP elevation  - s/p CT upper extremity, upper extremity Doppler also negative for DVT  - seen by vascular surgery no sign of compartment syndrome  - also to be seen per Plastic surgery/consult pt  - no indications for abx thus now monitoring off no fever no chills no signs of infection  - rheumatologic wkup in process, RF nml, pending C CP, lupus anticoagulant, anchor, JUAN, complements    Morbid obesity ([address] )  Assessment & Plan  · TLC as an outpatient  · Further to discuss with PCP  Transaminitis  Assessment & Plan  · Alkaline phosphatase and ALT and AST improving  Status post ultrasound the official read is pending  · Repeat CMP    Frequent headaches  Assessment & Plan  · Sounds like she has migraines , patient is c/o daily headaches since December    Patient is also complaining of blurry vision that has been also going since December  · Patient with elevated ESR but the blurry vision and the headache has been stable since December-and showed the patient needed to be started on steroids given the prolonged course of headache and visual changes  · Discussed with vascular surgery to see if she benefit from temporal artery biopsy  · Patient reported that she was supposed to see the Neurology as an outpatient due to headache and vision changes and weakness-neurology consultation pending  · Denies any new neurologic symptoms denies pain with chewing, denies jaw claudication, reports blurry vision has been going on at least a year or so and was evaluated by PCP and was thought to be due to diabetes  No previous history of giant cell arteritis  · Patient reported that today she does not have any pain  Mild intermittent asthma with exacerbation  Assessment & Plan  · Resume home medications of symbicort  · No acute exacerbation  Prolonged QT interval  Assessment & Plan  · qtc 487  · Avoid QT prolonging medications  · Denies any cardiac symptoms including palpitations chest pain shortness of breath exertion    Hyperlipidemia  Assessment & Plan  · Resume statin  · Transaminitis improved, alkaline phosphatase is up trending  · Monitor  · Obtain hepatitis panel tomorrow  · Ultrasound reviewed    Anemia  Assessment & Plan  · Chronic microcytic but keeps dropping from previous - check iron panel and ferritin , fecal occult- no report of bleeding  · This is most likely anemia of chronic disease  · Denies melena hematochezia hematemesis    Hypertension  Assessment & Plan  · Blood pressure acceptable  · Continue with the current care       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, with long-term current use of insulin [facility])  Assessment & Plan  Lab Results   Component Value Date    HGBA1C 14 6 (H) 08/22/2019       Recent Labs     09/21/19  1127 09/21/19  1600 09/21/19 2018 09/22/19  0596 POCGLU 200* 225* 310* 228*       Blood Sugar Average: Last 72 hrs:  · Reported that she uses 40 units of Lantus b i d  And 30-35 units of Humalog with meals  I have increase Lantus to 50 unit for tonight  · Will adjust the insulin and monitor  · Appropriate consultation in regards to urgently needed lifestyle nutritional modification of been discussed,     VTE Pharmacologic Prophylaxis:   Pharmacologic: Enoxaparin (Lovenox)  Mechanical VTE Prophylaxis in Place: Yes    Patient Centered Rounds: I have performed bedside rounds with nursing staff today  Discussions with Specialists or Other Care Team Provider:  Yes    Education and Discussions with Family / Patient:  Yes    Time Spent for Care: 30 minutes  More than 50% of total time spent on counseling and coordination of care as described above  Current Length of Stay: 6 day(s)    Current Patient Status: Inpatient   Certification Statement: The patient will continue to require additional inpatient hospital stay due to Pending studies/will need acute short-term    Discharge Plan / Estimated Discharge Date: To be determined    Code Status: Level 1 - Full Code      Subjective:   My right arm is better today still swollen better than yesterday    Objective:     Vitals:   Temp (24hrs), Av 2 °F (36 8 °C), Min:97 6 °F (36 4 °C), Max:98 5 °F (36 9 °C)    Temp:  [97 6 °F (36 4 °C)-98 5 °F (36 9 °C)] 97 6 °F (36 4 °C)  HR:  [80-83] 83  Resp:  [18-20] 20  BP: (118-150)/(61-80) 118/61  SpO2:  [97 %-99 %] 99 %  Body mass index is 46 26 kg/m²  Input and Output Summary (last 24 hours):        Intake/Output Summary (Last 24 hours) at 2019 1058  Last data filed at 2019 0831  Gross per 24 hour   Intake 1560 ml   Output 1800 ml   Net -240 ml       Physical Exam:     Physical Exam    Constitutional:  Well developed, well nourished, no acute distress, non-toxic appearance   Eyes:  PERRL, conjunctiva normal   HENT:  Atraumatic, external ears normal, nose normal, oropharynx moist, no pharyngeal exudates  Neck- normal range of motion, no tenderness, supple   Respiratory:  No respiratory distress, normal breath sounds, no rales, no wheezing   Cardiovascular:  Normal rate, normal rhythm, no murmurs, no gallops, no rubs   GI:  Soft, nondistended, normal bowel sounds, nontender, no organomegaly, no mass, no rebound, no guarding   :  No costovertebral angle tenderness   Musculoskeletal:  No edema, no tenderness, no deformities  Back- no tenderness  Integument:  Well hydrated, no rash   Lymphatic:  No lymphadenopathy noted   Neurologic:  Alert & oriented x 3, CN 2-12 normal, normal motor function, normal sensory function, no focal deficits noted   Psychiatric:  Speech and behavior appropriate             Additional Data:     Labs:    Results from last 7 days   Lab Units 09/22/19  0436   WBC Thousand/uL 13 00*   HEMOGLOBIN g/dL 8 4*   HEMATOCRIT % 26 8*   PLATELETS Thousands/uL 753*   NEUTROS PCT % 73*   LYMPHS PCT % 19*   MONOS PCT % 5   EOS PCT % 3     Results from last 7 days   Lab Units 09/22/19  0436   POTASSIUM mmol/L 4 3   CHLORIDE mmol/L 101   CO2 mmol/L 29   BUN mg/dL 14   CREATININE mg/dL 0 70   CALCIUM mg/dL 9 2   ALK PHOS U/L 235*   ALT U/L 31   AST U/L 24           * I Have Reviewed All Lab Data Listed Above  * Additional Pertinent Lab Tests Reviewed: [address] Admission Reviewed    Imaging:    Imaging Reports Reviewed Today Include:  Yes  Imaging Personally Reviewed by Myself Includes:  Yes    Recent Cultures (last 7 days):     Results from last 7 days   Lab Units 09/19/19  1250 09/19/19  1249 09/16/19  1028 09/16/19  0945   BLOOD CULTURE  No Growth at 48 hrs  No Growth at 48 hrs  No Growth After 5 Days  No Growth After 5 Days         Last 24 Hours Medication List:     Current Facility-Administered Medications:  acetaminophen 650 mg Oral Q6H PRN Marzena Celis DO    albuterol 2 puff Inhalation Q6H PRN Kobe Brown MD    aspirin 81 mg Oral Daily Orma [place], MD    atorvastatin 40 mg Oral Daily Orma [place], MD    budesonide-formoterol 2 puff Inhalation BID Orma [place], MD    diltiazem 420 mg Oral Daily Orma [place], MD    enoxaparin 40 mg Subcutaneous Q24H [facility] Orma [place], MD    gabapentin 100 mg Oral BID Orma [place], MD    insulin glargine 50 Units Subcutaneous Q12H [facility] Faizanhelderlashae Gordon    insulin lispro 1-6 Units Subcutaneous HS Daksha Torres MD    insulin lispro 18 Units Subcutaneous TID With Meals Daksha Torres MD    insulin lispro 2-12 Units Subcutaneous TID [facility] Daksha Torres MD    insulin regular 5 Units Subcutaneous Once Chanda ADAMS PA-C    iron sucrose 200 mg Intravenous Daily CONCETTA Seth Last Rate: 200 mg (09/22/19 1051)   LORazepam 0 5 mg Intravenous Once Agustin Moreno DO    losartan 100 mg Oral Daily Orma [place], MD    morphine injection 4 mg Intravenous Q4H PRN Aster Oneill DO    oxyCODONE 5 mg Oral Q6H PRN Orma [place], MD         Today, Patient Was Seen By: Delfina Eye    ** Please Note: This note has been constructed using a voice recognition system   **

## 2019-09-23 ENCOUNTER — TELEPHONE (OUTPATIENT)
Dept: RHEUMATOLOGY | Facility: CLINIC | Age: 59
End: 2019-09-23

## 2019-09-23 ENCOUNTER — HOSPITAL ENCOUNTER (INPATIENT)
Facility: HOSPITAL | Age: 59
LOS: 7 days | Discharge: HOME HEALTH | DRG: 546 | End: 2019-09-30
Attending: FAMILY MEDICINE | Admitting: FAMILY MEDICINE
Payer: COMMERCIAL

## 2019-09-23 ENCOUNTER — APPOINTMENT (INPATIENT)
Dept: MRI IMAGING | Facility: HOSPITAL | Age: 59
DRG: 607 | End: 2019-09-23
Payer: COMMERCIAL

## 2019-09-23 VITALS
DIASTOLIC BLOOD PRESSURE: 70 MMHG | TEMPERATURE: 98.5 F | SYSTOLIC BLOOD PRESSURE: 148 MMHG | OXYGEN SATURATION: 96 % | HEIGHT: 68 IN | BODY MASS INDEX: 44.41 KG/M2 | HEART RATE: 93 BPM | RESPIRATION RATE: 20 BRPM | WEIGHT: 293 LBS

## 2019-09-23 DIAGNOSIS — G72.49 OTHER INFLAMMATORY AND IMMUNE MYOPATHIES, NOT ELSEWHERE CLASSIFIED: Primary | ICD-10-CM

## 2019-09-23 DIAGNOSIS — IMO0002 UNCONTROLLED TYPE 2 DIABETES MELLITUS WITH OPHTHALMIC COMPLICATION, WITH LONG-TERM CURRENT USE OF INSULIN: ICD-10-CM

## 2019-09-23 DIAGNOSIS — L60.9 NAIL ABNORMALITIES: ICD-10-CM

## 2019-09-23 LAB
ALBUMIN SERPL BCP-MCNC: 3.3 G/DL (ref 3–5.2)
ALBUMIN SERPL ELPH-MCNC: 2.41 G/DL (ref 3.5–5)
ALBUMIN SERPL ELPH-MCNC: 36 % (ref 52–65)
ALP SERPL-CCNC: 264 U/L (ref 43–122)
ALPHA1 GLOB SERPL ELPH-MCNC: 0.52 G/DL (ref 0.1–0.4)
ALPHA1 GLOB SERPL ELPH-MCNC: 7.7 % (ref 2.5–5)
ALPHA2 GLOB SERPL ELPH-MCNC: 1.41 G/DL (ref 0.4–1.2)
ALPHA2 GLOB SERPL ELPH-MCNC: 21 % (ref 7–13)
ALT SERPL W P-5'-P-CCNC: 26 U/L (ref 9–52)
ANION GAP SERPL CALCULATED.3IONS-SCNC: 8 MMOL/L (ref 5–14)
APTT SCREEN TO CONFIRM RATIO: 1.11 RATIO (ref 0–1.4)
AST SERPL W P-5'-P-CCNC: 26 U/L (ref 14–36)
BASOPHILS # BLD AUTO: 0.2 THOUSANDS/ΜL (ref 0–0.1)
BASOPHILS NFR BLD AUTO: 1 % (ref 0–1)
BETA GLOB ABNORMAL SERPL ELPH-MCNC: 0.66 G/DL (ref 0.4–0.8)
BETA1 GLOB SERPL ELPH-MCNC: 9.8 % (ref 5–13)
BETA2 GLOB SERPL ELPH-MCNC: 7.6 % (ref 2–8)
BETA2+GAMMA GLOB SERPL ELPH-MCNC: 0.51 G/DL (ref 0.2–0.5)
BILIRUB SERPL-MCNC: 0.4 MG/DL
BUN SERPL-MCNC: 15 MG/DL (ref 5–25)
CALCIUM SERPL-MCNC: 9.2 MG/DL (ref 8.4–10.2)
CHLORIDE SERPL-SCNC: 102 MMOL/L (ref 97–108)
CO2 SERPL-SCNC: 30 MMOL/L (ref 22–30)
CONFIRM APTT/NORMAL: 55.6 SEC (ref 0–55)
CREAT SERPL-MCNC: 0.77 MG/DL (ref 0.6–1.2)
DRVVT IMM 1:2 NP PPP: 42.7 SEC (ref 0–47)
DSDNA AB SER-ACNC: <1 IU/ML (ref 0–9)
EOSINOPHIL # BLD AUTO: 0.3 THOUSAND/ΜL (ref 0–0.4)
EOSINOPHIL NFR BLD AUTO: 3 % (ref 0–6)
ERYTHROCYTE [DISTWIDTH] IN BLOOD BY AUTOMATED COUNT: 15.6 %
GAMMA GLOB ABNORMAL SERPL ELPH-MCNC: 1.2 G/DL (ref 0.5–1.6)
GAMMA GLOB SERPL ELPH-MCNC: 17.9 % (ref 12–22)
GFR SERPL CREATININE-BSD FRML MDRD: 97 ML/MIN/1.73SQ M
GLUCOSE SERPL-MCNC: 137 MG/DL (ref 65–140)
GLUCOSE SERPL-MCNC: 181 MG/DL (ref 70–99)
GLUCOSE SERPL-MCNC: 190 MG/DL (ref 65–140)
GLUCOSE SERPL-MCNC: 204 MG/DL (ref 65–140)
GLUCOSE SERPL-MCNC: 205 MG/DL (ref 65–140)
HCT VFR BLD AUTO: 27.2 % (ref 36–46)
HGB BLD-MCNC: 8.5 G/DL (ref 12–16)
HYPERCHROMIA BLD QL SMEAR: PRESENT
IGG/ALB SER: 0.56 {RATIO} (ref 1.1–1.8)
LA PPP-IMP: ABNORMAL
LYMPHOCYTES # BLD AUTO: 2.5 THOUSANDS/ΜL (ref 0.5–4)
LYMPHOCYTES NFR BLD AUTO: 19 % (ref 25–45)
MCH RBC QN AUTO: 24 PG (ref 26–34)
MCHC RBC AUTO-ENTMCNC: 31.3 G/DL (ref 31–36)
MCV RBC AUTO: 77 FL (ref 80–100)
MICROCYTES BLD QL AUTO: PRESENT
MONOCYTES # BLD AUTO: 0.5 THOUSAND/ΜL (ref 0.2–0.9)
MONOCYTES NFR BLD AUTO: 4 % (ref 1–10)
NEUTROPHILS # BLD AUTO: 9.5 THOUSANDS/ΜL (ref 1.8–7.8)
NEUTS SEG NFR BLD AUTO: 73 % (ref 45–65)
PLATELET # BLD AUTO: 740 THOUSANDS/UL (ref 150–450)
PLATELET BLD QL SMEAR: ABNORMAL
PMV BLD AUTO: 7.9 FL (ref 8.9–12.7)
POTASSIUM SERPL-SCNC: 4.1 MMOL/L (ref 3.6–5)
PROT PATTERN SERPL ELPH-IMP: ABNORMAL
PROT SERPL-MCNC: 6.7 G/DL (ref 6.4–8.2)
PROT SERPL-MCNC: 7.3 G/DL (ref 5.9–8.4)
RBC # BLD AUTO: 3.56 MILLION/UL (ref 4–5.2)
RBC MORPH BLD: ABNORMAL
SCREEN APTT: 51.7 SEC (ref 0–51.9)
SCREEN DRVVT: 49.7 SEC (ref 0–47)
SODIUM SERPL-SCNC: 140 MMOL/L (ref 137–147)
THROMBIN TIME: 18.6 SEC (ref 0–23)
WBC # BLD AUTO: 12.9 THOUSAND/UL (ref 4.5–11)

## 2019-09-23 PROCEDURE — 97110 THERAPEUTIC EXERCISES: CPT

## 2019-09-23 PROCEDURE — 70544 MR ANGIOGRAPHY HEAD W/O DYE: CPT

## 2019-09-23 PROCEDURE — 99239 HOSP IP/OBS DSCHRG MGMT >30: CPT | Performed by: FAMILY MEDICINE

## 2019-09-23 PROCEDURE — 97530 THERAPEUTIC ACTIVITIES: CPT

## 2019-09-23 PROCEDURE — 82948 REAGENT STRIP/BLOOD GLUCOSE: CPT

## 2019-09-23 PROCEDURE — 80053 COMPREHEN METABOLIC PANEL: CPT | Performed by: INTERNAL MEDICINE

## 2019-09-23 PROCEDURE — 99306 1ST NF CARE HIGH MDM 50: CPT | Performed by: FAMILY MEDICINE

## 2019-09-23 PROCEDURE — 97116 GAIT TRAINING THERAPY: CPT

## 2019-09-23 PROCEDURE — 85025 COMPLETE CBC W/AUTO DIFF WBC: CPT | Performed by: INTERNAL MEDICINE

## 2019-09-23 RX ORDER — BUDESONIDE AND FORMOTEROL FUMARATE DIHYDRATE 80; 4.5 UG/1; UG/1
2 AEROSOL RESPIRATORY (INHALATION) 2 TIMES DAILY
Status: DISCONTINUED | OUTPATIENT
Start: 2019-09-23 | End: 2019-09-30 | Stop reason: HOSPADM

## 2019-09-23 RX ORDER — ALBUTEROL SULFATE 90 UG/1
2 AEROSOL, METERED RESPIRATORY (INHALATION) EVERY 6 HOURS PRN
Status: CANCELLED | OUTPATIENT
Start: 2019-09-23

## 2019-09-23 RX ORDER — ATORVASTATIN CALCIUM 40 MG/1
40 TABLET, FILM COATED ORAL DAILY
Status: DISCONTINUED | OUTPATIENT
Start: 2019-09-24 | End: 2019-09-30 | Stop reason: HOSPADM

## 2019-09-23 RX ORDER — LOSARTAN POTASSIUM 50 MG/1
100 TABLET ORAL DAILY
Status: CANCELLED | OUTPATIENT
Start: 2019-09-24

## 2019-09-23 RX ORDER — ALBUTEROL SULFATE 90 UG/1
2 AEROSOL, METERED RESPIRATORY (INHALATION) EVERY 6 HOURS PRN
Status: DISCONTINUED | OUTPATIENT
Start: 2019-09-23 | End: 2019-09-30 | Stop reason: HOSPADM

## 2019-09-23 RX ORDER — ACETAMINOPHEN 325 MG/1
650 TABLET ORAL EVERY 6 HOURS PRN
Status: CANCELLED | OUTPATIENT
Start: 2019-09-23

## 2019-09-23 RX ORDER — INSULIN GLARGINE 100 [IU]/ML
52 INJECTION, SOLUTION SUBCUTANEOUS EVERY 12 HOURS SCHEDULED
Status: CANCELLED | OUTPATIENT
Start: 2019-09-23

## 2019-09-23 RX ORDER — MORPHINE SULFATE 4 MG/ML
4 INJECTION, SOLUTION INTRAMUSCULAR; INTRAVENOUS EVERY 4 HOURS PRN
Status: DISCONTINUED | OUTPATIENT
Start: 2019-09-23 | End: 2019-09-23

## 2019-09-23 RX ORDER — MORPHINE SULFATE 4 MG/ML
4 INJECTION, SOLUTION INTRAMUSCULAR; INTRAVENOUS EVERY 4 HOURS PRN
Status: CANCELLED | OUTPATIENT
Start: 2019-09-23

## 2019-09-23 RX ORDER — INSULIN GLARGINE 100 [IU]/ML
55 INJECTION, SOLUTION SUBCUTANEOUS EVERY 12 HOURS SCHEDULED
Status: DISCONTINUED | OUTPATIENT
Start: 2019-09-23 | End: 2019-09-23 | Stop reason: HOSPADM

## 2019-09-23 RX ORDER — AMOXICILLIN 250 MG
1 CAPSULE ORAL 2 TIMES DAILY PRN
Status: DISCONTINUED | OUTPATIENT
Start: 2019-09-23 | End: 2019-09-30 | Stop reason: HOSPADM

## 2019-09-23 RX ORDER — OXYCODONE HYDROCHLORIDE 5 MG/1
5 TABLET ORAL EVERY 6 HOURS PRN
Status: DISCONTINUED | OUTPATIENT
Start: 2019-09-23 | End: 2019-09-30 | Stop reason: HOSPADM

## 2019-09-23 RX ORDER — GABAPENTIN 100 MG/1
100 CAPSULE ORAL 2 TIMES DAILY
Status: CANCELLED | OUTPATIENT
Start: 2019-09-23

## 2019-09-23 RX ORDER — ASPIRIN 81 MG/1
81 TABLET, CHEWABLE ORAL DAILY
Status: CANCELLED | OUTPATIENT
Start: 2019-09-24

## 2019-09-23 RX ORDER — ACETAMINOPHEN 325 MG/1
650 TABLET ORAL EVERY 6 HOURS PRN
Status: DISCONTINUED | OUTPATIENT
Start: 2019-09-23 | End: 2019-09-30 | Stop reason: HOSPADM

## 2019-09-23 RX ORDER — BUDESONIDE AND FORMOTEROL FUMARATE DIHYDRATE 80; 4.5 UG/1; UG/1
2 AEROSOL RESPIRATORY (INHALATION) 2 TIMES DAILY
Status: CANCELLED | OUTPATIENT
Start: 2019-09-23

## 2019-09-23 RX ORDER — GABAPENTIN 100 MG/1
100 CAPSULE ORAL 2 TIMES DAILY
Status: DISCONTINUED | OUTPATIENT
Start: 2019-09-24 | End: 2019-09-30 | Stop reason: HOSPADM

## 2019-09-23 RX ORDER — OXYCODONE HYDROCHLORIDE 5 MG/1
5 TABLET ORAL EVERY 6 HOURS PRN
Status: CANCELLED | OUTPATIENT
Start: 2019-09-23

## 2019-09-23 RX ORDER — INSULIN GLARGINE 100 [IU]/ML
52 INJECTION, SOLUTION SUBCUTANEOUS EVERY 12 HOURS SCHEDULED
Status: DISCONTINUED | OUTPATIENT
Start: 2019-09-23 | End: 2019-09-30 | Stop reason: HOSPADM

## 2019-09-23 RX ORDER — ASPIRIN 81 MG/1
81 TABLET, CHEWABLE ORAL DAILY
Status: DISCONTINUED | OUTPATIENT
Start: 2019-09-24 | End: 2019-09-30 | Stop reason: HOSPADM

## 2019-09-23 RX ORDER — LOSARTAN POTASSIUM 50 MG/1
100 TABLET ORAL DAILY
Status: DISCONTINUED | OUTPATIENT
Start: 2019-09-24 | End: 2019-09-30 | Stop reason: HOSPADM

## 2019-09-23 RX ORDER — ATORVASTATIN CALCIUM 40 MG/1
40 TABLET, FILM COATED ORAL DAILY
Status: CANCELLED | OUTPATIENT
Start: 2019-09-24

## 2019-09-23 RX ADMIN — ENOXAPARIN SODIUM 40 MG: 40 INJECTION SUBCUTANEOUS at 08:18

## 2019-09-23 RX ADMIN — INSULIN GLARGINE 50 UNITS: 100 INJECTION, SOLUTION SUBCUTANEOUS at 08:15

## 2019-09-23 RX ADMIN — ASPIRIN 81 MG 81 MG: 81 TABLET ORAL at 08:11

## 2019-09-23 RX ADMIN — GABAPENTIN 100 MG: 100 CAPSULE ORAL at 08:11

## 2019-09-23 RX ADMIN — GABAPENTIN 100 MG: 100 CAPSULE ORAL at 17:14

## 2019-09-23 RX ADMIN — ATORVASTATIN CALCIUM 40 MG: 40 TABLET, FILM COATED ORAL at 08:10

## 2019-09-23 RX ADMIN — BUDESONIDE AND FORMOTEROL FUMARATE DIHYDRATE 2 PUFF: 80; 4.5 AEROSOL RESPIRATORY (INHALATION) at 23:03

## 2019-09-23 RX ADMIN — LOSARTAN POTASSIUM 100 MG: 50 TABLET ORAL at 08:11

## 2019-09-23 RX ADMIN — INSULIN LISPRO 4 UNITS: 100 INJECTION, SOLUTION INTRAVENOUS; SUBCUTANEOUS at 11:40

## 2019-09-23 RX ADMIN — DILTIAZEM HYDROCHLORIDE 420 MG: 240 CAPSULE, EXTENDED RELEASE ORAL at 08:11

## 2019-09-23 RX ADMIN — INSULIN LISPRO 2 UNITS: 100 INJECTION, SOLUTION INTRAVENOUS; SUBCUTANEOUS at 08:17

## 2019-09-23 RX ADMIN — INSULIN LISPRO 4 UNITS: 100 INJECTION, SOLUTION INTRAVENOUS; SUBCUTANEOUS at 16:44

## 2019-09-23 RX ADMIN — BUDESONIDE AND FORMOTEROL FUMARATE DIHYDRATE 2 PUFF: 80; 4.5 AEROSOL RESPIRATORY (INHALATION) at 08:11

## 2019-09-23 NOTE — ASSESSMENT & PLAN NOTE
Patient probably has migraine headaches  Also found to have small aneurysms present on MRA of the head and neck    Continue conservative management for now

## 2019-09-23 NOTE — PROGRESS NOTES
Progress Note - Abel Linn 1960, 59 y o  female MRN: [id number]    Unit/Bed#: 5T -01 Encounter: [id number]    Primary Care Provider: Jaye Pérez MD   Date and time admitted to hospital: 9/16/2019  9:32 AM        * Pain and swelling of right upper extremity  Assessment & Plan  · Unclear etiology  Patient had pain swelling and weakness of bilateral upper extremity on presentation  Her pain and swelling is gradually improving but she still has weakness of both arms and hands  · She has wbc and thrombocytosis   · CT of the upper extremity is negative for any acute pathology  · Upper extremity Doppler is negative for any acute dvt  · Patient was initially started on antibiotics but since she developed similar symptoms on the other side antibiotics were discontinued likely Rheumatology in nature  Rheumatology workup in progress  · Vascular surgery evaluation appreciated  Further Following workup has been initiated  · Patient has some serological studies which are still pending at this time  Did discuss over the phone with Rheumatology  Continue to observe without any treatment at this time  · Will have her go to for subacute rehab short term      Morbid obesity ([facility], [address] )  Assessment & Plan  · TLC as an outpatient  · Further to discuss with PCP  Transaminitis  Assessment & Plan  · Alkaline phosphatase and ALT and AST improving  Avoid hepatotoxic agents  Liver ultrasound reviewed    Frequent headaches  Assessment & Plan  · Sounds like she has migraines , patient is c/o daily headaches since December  Patient is also complaining of blurry vision that has been also going since December  · Patient with elevated ESR but the blurry vision and the headache has been stable since December-and showed the patient needed to be started on steroids given the prolonged course of headache and visual changes    · Discussed with vascular surgery to see if she benefit from temporal artery biopsy  · Patient reported that she was supposed to see the Neurology as an outpatient due to headache and vision changes and weakness-neurology consultation pending  · Denies any new neurologic symptoms denies pain with chewing, denies jaw claudication, reports blurry vision has been going on at least a year or so and was evaluated by PCP and was thought to be due to diabetes  No previous history of giant cell arteritis  · Patient reported that today she does not have any pain  she is also reluctant to start steroids due to her diabetes  · MRA BRAIN reviewed with the patient    Mild intermittent asthma with exacerbation  Assessment & Plan  · Resume home medications of symbicort  · No acute exacerbation  Prolonged QT interval  Assessment & Plan  · qtc 487  · Avoid QT prolonging medications  · Denies any cardiac symptoms including palpitations chest pain shortness of breath exertion    Hyperlipidemia  Assessment & Plan  · Resume statin  · Transaminitis improved, alkaline phosphatase is up trending  · Monitor  · Ultrasound reviewed    Anemia  Assessment & Plan  · Chronic microcytic but keeps dropping from previous - check iron panel and ferritin , fecal occult- no report of bleeding  · This is most likely anemia of chronic disease  · Denies melena hematochezia hematemesis  · HB STABLE AT 8 5    Essential hypertension  Assessment & Plan  · Blood pressure acceptable  · Continue with the current care   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)  Assessment & Plan  Lab Results   Component Value Date    HGBA1C 14 6 (H) 08/22/2019       Recent Labs     09/22/19  1552 09/22/19  2021 09/23/19  0600 09/23/19  1122   POCGLU 187* 179* 190* 204*       Blood Sugar Average: Last 72 hrs:  · Reported that she uses 40 units of Lantus b i d  And 30-35 units of Humalog with meals     · Will adjust the insulin and monitor  · Appropriate consultation in regards to urgently needed lifestyle nutritional modification of been discussed,      VTE Pharmacologic Prophylaxis:   Pharmacologic: Enoxaparin (Lovenox)  Mechanical VTE Prophylaxis in Place: Yes    Patient Centered Rounds: I have performed bedside rounds with nursing staff today  Discussions with Specialists or Other Care Team Provider:  Discussed with Rheumatology over the phone    Education and Discussions with Family / Patient:  Discussed with patient at bedside about hospital course    Time Spent for Care: 30 minutes  More than 50% of total time spent on counseling and coordination of care as described above  Current Length of Stay: 7 day(s)    Current Patient Status: Inpatient   Certification Statement: The patient will continue to require additional inpatient hospital stay due to Generalized weakness    Discharge Plan: For short-term rehab once authorization obtained    Code Status: Level 1 - Full Code      Subjective:   Patient complains of feeling weakness in both of her arms and hands  She states that her shoulders and arms are less swollen now than when she came in  She also states that her pain is also decreased and she is able to move her arms better now than she did before  Her arms do not feel as stiff as they did initially  Objective:     Vitals:   Temp (24hrs), Av 8 °F (36 6 °C), Min:97 5 °F (36 4 °C), Max:98 °F (36 7 °C)    Temp:  [97 5 °F (36 4 °C)-98 °F (36 7 °C)] 97 5 °F (36 4 °C)  HR:  [82] 82  Resp:  [18] 18  BP: (107-124)/(69) 107/69  SpO2:  [98 %-99 %] 99 %  Body mass index is 45 66 kg/m²  Input and Output Summary (last 24 hours): Intake/Output Summary (Last 24 hours) at 2019 1511  Last data filed at 2019 1696  Gross per 24 hour   Intake 1080 ml   Output 1300 ml   Net -220 ml       Physical Exam:     Physical Exam   Constitutional: She is oriented to person, place, and time  She appears well-developed and well-nourished     HENT:   Head: Normocephalic and atraumatic  Mouth/Throat: Oropharynx is clear and moist    Eyes: Conjunctivae and EOM are normal    Neck: Normal range of motion  Neck supple  Cardiovascular: Normal rate, regular rhythm and normal heart sounds  Pulmonary/Chest: Effort normal and breath sounds normal    Abdominal: Soft  Bowel sounds are normal  She exhibits no mass  There is no tenderness  There is no rebound and no guarding  Genitourinary:   Genitourinary Comments: deferred   Musculoskeletal:   Weakness of bilateral upper extremity involving the proximal muscles and the distal muscles including her hands as well  Power is 4/ 5 bilateral upper extremity   Neurological: She is alert and oriented to person, place, and time  She has normal reflexes  Cranial nerves 2-12 are normal   Normal neurological exam   Skin: Skin is warm and dry  No rash noted  Psychiatric: She has a normal mood and affect  Nursing note and vitals reviewed          Additional Data:     Labs:    Results from last 7 days   Lab Units 09/23/19  0544   WBC Thousand/uL 12 90*   HEMOGLOBIN g/dL 8 5*   HEMATOCRIT % 27 2*   PLATELETS Thousands/uL 740*   NEUTROS PCT % 73*   LYMPHS PCT % 19*   MONOS PCT % 4   EOS PCT % 3     Results from last 7 days   Lab Units 09/23/19  0544   SODIUM mmol/L 140   POTASSIUM mmol/L 4 1   CHLORIDE mmol/L 102   CO2 mmol/L 30   BUN mg/dL 15   CREATININE mg/dL 0 77   ANION GAP mmol/L 8   CALCIUM mg/dL 9 2   ALBUMIN g/dL 3 3   TOTAL BILIRUBIN mg/dL 0 40   ALK PHOS U/L 264*   ALT U/L 26   AST U/L 26   GLUCOSE RANDOM mg/dL 181*         Results from last 7 days   Lab Units 09/23/19  1122 09/23/19  0600 09/22/19 2021 09/22/19  1552 09/22/19  1125 09/22/19  0548 09/21/19  2018 09/21/19  1600 09/21/19  1127 09/21/19  0529 09/20/19 2028 09/20/19  1552   POC GLUCOSE mg/dl 204* 190* 179* 187* 214* 228* 310* 225* 200* 178* 183* 109         Results from last 7 days   Lab Units 09/20/19  1613   PROCALCITONIN ng/ml 0 15           * I Have Reviewed All Lab Data Listed Above  * Additional Pertinent Lab Tests Reviewed: All Labs For Current Hospital Admission Reviewed    Imaging:    Imaging Reports Reviewed Today Include:  MRA brain  CT chest abdomen pelvis  Imaging Personally Reviewed by Myself Includes:  none    Recent Cultures (last 7 days):     Results from last 7 days   Lab Units 09/19/19  1250 09/19/19  1249   BLOOD CULTURE  No Growth at 72 hrs  No Growth at 72 hrs  Last 24 Hours Medication List:     Current Facility-Administered Medications:  acetaminophen 650 mg Oral Q6H PRN [place] Held, DO   albuterol 2 puff Inhalation Q6H PRN Natalia Lazo MD   aspirin 81 mg Oral Daily Natalia Lazo MD   atorvastatin 40 mg Oral Daily Natalia Lazo MD   budesonide-formoterol 2 puff Inhalation BID Natalia Lazo MD   diltiazem 420 mg Oral Daily Natalia Lazo MD   enoxaparin 40 mg Subcutaneous Q24H [facility] Natalia Lazo MD   gabapentin 100 mg Oral BID Natalia Lazo MD   insulin glargine 55 Units Subcutaneous Q12H [facility] Oscar Li MD   insulin lispro 18 Units Subcutaneous TID With Meals Nataliya Meade MD   insulin lispro 2-12 Units Subcutaneous TID [facility] Nataliya Meade MD   insulin regular 5 Units Subcutaneous Once Oleksandr AADMS PA-C   LORazepam 0 5 mg Intravenous Once Eliza Cramer, DO   losartan 100 mg Oral Daily Natalia Lazo MD   morphine injection 4 mg Intravenous Q4H PRN [place] Held, DO   oxyCODONE 5 mg Oral Q6H PRN Natalia Lazo MD        Today, Patient Was Seen By: Oscar Li MD    ** Please Note: Dictation voice to text software may have been used in the creation of this document   **

## 2019-09-23 NOTE — TELEPHONE ENCOUNTER
Please schedule on October 9th and combine the 11 and 11:30 slots  She will be a new patient  Thank you

## 2019-09-23 NOTE — TELEPHONE ENCOUNTER
This is a hospital patient who needs outpatient rheum eval in 2-3 weeks (not sooner as she will be in a rehab)  I do not have anything open right now, do you either of you have availability to fit her in as a new patient? I think has to be in [place] (not [place]) based off her insurance

## 2019-09-23 NOTE — NURSING NOTE
Pt discharged, IV removed, belongings accounted for  Report called to RN on TCF  Pt escorted to TCF with nursing staff via wheelchair

## 2019-09-23 NOTE — SOCIAL WORK
SW met w/ pt to present w/ and explain IMM #2  Pt had no questions or concerns and signed  SW provided pt w/ copy of IMM #2 and placed original in scan bin to be scanned into EHR

## 2019-09-23 NOTE — ASSESSMENT & PLAN NOTE
Patient was recently evaluated for bilateral upper extremity swelling tenderness and weakness  Rheumatological workup was ordered  Will check a ESR and a CRP in 1 week  Outpatient follow-up with Rheumatology being arranged as well  No interventions at this time other than physical therapy and occupational therapy  Patient has a repeat flare and worsening of her inflammatory markers may be a candidate for short course of steroids    Is unclear whether she had polymyalgia rheumatica flare versus myositis versus other rheumatological etiology  Continue p r n   Oxy IR for upper extremity pain

## 2019-09-23 NOTE — PHYSICAL THERAPY NOTE
26' session     09/23/19 1528   Pain Assessment   Pain Assessment 0-10   Pain Score 1   Pain Location Hand   Pain Orientation Right;Left   Pain Descriptors Aching;Discomfort   Pain Frequency Intermittent   Pain Onset Ongoing   Clinical Progression Gradually improving   Patient's Stated Pain Goal No pain   Hospital Pain Intervention(s) Repositioned   Response to Interventions no report of increase pain with session   General   Chart Reviewed Yes   Family/Caregiver Present No   Cognition   Overall Cognitive Status WFL   Arousal/Participation Alert; Cooperative   Attention Within functional limits   Following Commands Follows all commands and directions without difficulty   Subjective   Subjective reports not having much help at home   Transfers   Sit to Stand 5  Supervision   Stand to Sit 5  Supervision   Stand pivot 5  Supervision   Additional Comments donned sneakers with A prior to amb   Ambulation/Elevation   Gait pattern Decreased foot clearance; Wide EUGENIE  (slow pace - waddling gt)   Gait Assistance   (CG)   Assistive Device None   Distance 768',72',22'   Stair Management Assistance 4  Minimal assist   Additional items Assist x 1   Stair Management Technique One rail R;Two rails; Step to pattern  (sideways down- see assessment)   Number of Stairs 12   Balance   Static Standing Fair -   Dynamic Standing Fair -   Ambulatory Fair -   Endurance Deficit   Endurance Deficit Yes   Activity Tolerance   Activity Tolerance Patient limited by fatigue   Exercises   Balance training  see assessment for education   Assessment   Prognosis Good   Problem List Decreased strength;Decreased endurance;Decreased mobility;Pain;Obesity   Assessment Pt reports feeling better in the hands overall with decrease swelling - but still present  Pt educated in elevation, retro grade gentle massage and grasping ex  Pt states then compression sleeves were removed yesterday   Pt's gt is slow and wadlling - pt reports improvement with use of sneakers but still does not feel back to baseline  Pt started steps today-cued for step to for going up as step over step was unsteady and weak with LLE leading   Pt educated in down backwards 2* report of occ dizziness on steps and fear of heights , but pt did not want to try this - goes down sideways with hip slightly leaning on rail and held B rails( pt only has one at home and reports holding wall on opposite side)Pt reports that spouse and son do not help pt much if at all and will need to be independent with all needs- rec STR   Barriers to Discharge Inaccessible home environment;Decreased caregiver support   Goals   Patient Goals feel better   LTG Expiration Date 10/01/19   Treatment Day 2   Plan   Treatment/Interventions   (cont as per pOC)   Progress Progressing toward goals   PT Frequency   (3-5 x/ wk- BID prn)   Recommendation   Recommendation Short-term skilled PT   PT - OK to Discharge   (yes when medically cleared)

## 2019-09-23 NOTE — NURSING NOTE
Pt OOB in chair  Reports mild pain to BL hands, "they feel tight " Also reports occasional headache and dizziness  Denies chest pain or shortness of breath  BL hand swelling present, slightly improved from yesterday and pt reports improved functionality to both hands  Denies numbness or tingling to hands  Good appetite  Denies N/V/D  Call bell within reach

## 2019-09-23 NOTE — ASSESSMENT & PLAN NOTE
· Unclear etiology  Patient had pain swelling and weakness of bilateral upper extremity on presentation  Her pain and swelling is gradually improving but she still has weakness of both arms and hands  · She has wbc and thrombocytosis   · CT of the upper extremity is negative for any acute pathology  · Upper extremity Doppler is negative for any acute dvt  · Patient was initially started on antibiotics but since she developed similar symptoms on the other side antibiotics were discontinued likely Rheumatology in nature  Rheumatology workup in progress  · Vascular surgery evaluation appreciated  Further Following workup has been initiated  · Patient has some serological studies which are still pending at this time  Did discuss over the phone with Rheumatology  Continue to observe without any treatment at this time    · Will have her go to for subacute rehab short term

## 2019-09-23 NOTE — OCCUPATIONAL THERAPY NOTE
Occupational Therapy Treatment Note      TIME: 30 mins  VITALS: 97% RA HR 89  PAIN: 6/10 head pressure; declined need for meds   COGNITION: Cooperative  PRECAUTIONS: L hand swelling     EXPIRATION DATE: 9/28/19  TREATMENT DAY: 2  DISCHARGE RECOMMENDATION:  Short stay rehab     RN cleared pt for OT tx  Remains in recliner w/ all needs at end of session w/ LE's elevated  Requested assist for filing disability papers  OT notified CM  S: "I can move my hands better today"     O: Pt performed grasp/release of B/L hands w/ towel x 10 reps for 2 sets  Intention tremors noted w/ R being worse then L  Pt states chronic but more noticeable lately  Completed UE exercises using 1# cuff wt on each arm as follows: elbow flexion x 15 reps and shld flexion x 10 reps  Pt doffed/donned socks w/ MI and increased time  Declined the need to use the bathroom  Reports she's been taking herself and managing  Sit to stand MI and performed functional mobility on unit w/ S without AD  SPT to recliner w/ S without AD  Remains in recliner w/ all needs at end of session  A: Pt making progress toward goals  Improvement noted in hand function as observed by her texting, managing socks, completed lunch, and self report  However, increased swelling remains in L hand  In addition, B/L hands and UE's remain weak  Pt fatigues w/ minimal exertion  P:  Continue OT as per POC to maximize function  Pt must be (I) to return home and resume prior roles & routines  Recommend short stay rehab prior to discharge home       Prashanth Turcios MS, OTR/L

## 2019-09-23 NOTE — ASSESSMENT & PLAN NOTE
Lab Results   Component Value Date    HGBA1C 14 6 (H) 08/22/2019       Recent Labs     09/22/19  1552 09/22/19  2021 09/23/19  0600 09/23/19  1122   POCGLU 187* 179* 190* 204*       Blood Sugar Average: Last 72 hrs:  · Reported that she uses 40 units of Lantus b i d  And 30-35 units of Humalog with meals     · Will adjust the insulin and monitor  · Appropriate consultation in regards to urgently needed lifestyle nutritional modification of been discussed,

## 2019-09-23 NOTE — ASSESSMENT & PLAN NOTE
· Resume statin  · Transaminitis improved, alkaline phosphatase is up trending  · Monitor    · Ultrasound reviewed

## 2019-09-23 NOTE — ASSESSMENT & PLAN NOTE
· Alkaline phosphatase and ALT and AST improving  Avoid hepatotoxic agents    Liver ultrasound reviewed

## 2019-09-23 NOTE — ASSESSMENT & PLAN NOTE
· Chronic microcytic but keeps dropping from previous - check iron panel and ferritin , fecal occult- no report of bleeding  · This is most likely anemia of chronic disease    · Denies melena hematochezia hematemesis  · HB STABLE AT 8 5

## 2019-09-23 NOTE — ASSESSMENT & PLAN NOTE
Lab Results   Component Value Date    HGBA1C 14 6 (H) 08/22/2019       Recent Labs     09/22/19  2021 09/23/19  0600 09/23/19  1122 09/23/19  1542   POCGLU 179* 190* 204* 205*       Blood Sugar Average: Last 72 hrs:  · Reported that she uses 40 units of Lantus b i d  And 30-35 units of Humalog with meals     · Will adjust the insulin and monitor  · Appropriate consultation in regards to urgently needed lifestyle nutritional modification of been discussed,

## 2019-09-23 NOTE — PLAN OF CARE
Problem: PHYSICAL THERAPY ADULT  Goal: Performs mobility at highest level of function for planned discharge setting  See evaluation for individualized goals  Description  Treatment/Interventions: Functional transfer training, LE strengthening/ROM, Elevations, Therapeutic exercise, Endurance training, Patient/family training, Equipment eval/education, Bed mobility, Gait training, OT, Spoke to nursing  Equipment Recommended: Other (Comment), Cane, Walker(RECOMMEND USE OF RW IF EDEMA IN R UE IMPROVES )       See flowsheet documentation for full assessment, interventions and recommendations  Outcome: Progressing  Note:   Prognosis: Good  Problem List: Decreased strength, Decreased endurance, Decreased mobility, Pain, Obesity  Assessment: Pt reports feeling better in the hands overall with decrease swelling - but still present  Pt educated in elevation, retro grade gentle massage and grasping ex  Pt states then compression sleeves were removed yesterday  Pt's gt is slow and wadlling - pt reports improvement with use of sneakers but still does not feel back to baseline  Pt started steps today-cued for step to for going up as step over step was unsteady and weak with LLE leading  Pt educated in down backwards 2* report of occ dizziness on steps and fear of heights , but pt did not want to try this - goes down sideways with hip slightly leaning on rail and held B rails( pt only has one at home and reports holding wall on opposite side)Pt reports that spouse and son do not help pt much if at all and will need to be independent with all needs- rec STR  Barriers to Discharge: Inaccessible home environment, Decreased caregiver support     Recommendation: Short-term skilled PT     PT - OK to Discharge: (yes when medically cleared)    See flowsheet documentation for full assessment

## 2019-09-23 NOTE — ASSESSMENT & PLAN NOTE
Lab Results   Component Value Date    HGBA1C 14 6 (H) 08/22/2019       Recent Labs     09/22/19  2021 09/23/19  0600 09/23/19  1122 09/23/19  1542   POCGLU 179* 190* 204* 205*       Blood Sugar Average: Last 72 hrs:   improving control  Continue Lantus and insulin sliding scale with NovoLog standing dose    Also continue Neurontin for neuropathic pain

## 2019-09-23 NOTE — ASSESSMENT & PLAN NOTE
· Sounds like she has migraines , patient is c/o daily headaches since December  Patient is also complaining of blurry vision that has been also going since December  · Patient with elevated ESR but the blurry vision and the headache has been stable since December-and showed the patient needed to be started on steroids given the prolonged course of headache and visual changes  · Discussed with vascular surgery to see if she benefit from temporal artery biopsy  · Patient reported that she was supposed to see the Neurology as an outpatient due to headache and vision changes and weakness-neurology consultation pending  · Denies any new neurologic symptoms denies pain with chewing, denies jaw claudication, reports blurry vision has been going on at least a year or so and was evaluated by PCP and was thought to be due to diabetes  No previous history of giant cell arteritis  · Patient reported that today she does not have any pain  she is also reluctant to start steroids due to her diabetes  · MRA BRAIN reviewed with the patient

## 2019-09-23 NOTE — DISCHARGE SUMMARY
Discharge- Alexsander Isabel 1960, 59 y o  female MRN: [id number]    Unit/Bed#: 5T -01 Encounter: [id number]    Primary Care Provider: Herbert Vazquez MD   Date and time admitted to hospital: 9/16/2019  9:32 AM        * Pain and swelling of right upper extremity  Assessment & Plan  · Unclear etiology  Patient had pain swelling and weakness of bilateral upper extremity on presentation  Her pain and swelling is gradually improving but she still has weakness of both arms and hands  · She has wbc and thrombocytosis   · CT of the upper extremity is negative for any acute pathology  · Upper extremity Doppler is negative for any acute dvt  · Patient was initially started on antibiotics but since she developed similar symptoms on the other side antibiotics were discontinued likely Rheumatology in nature  Rheumatology workup in progress  · Vascular surgery evaluation appreciated  Further Following workup has been initiated  · Patient has some serological studies which are still pending at this time  Did discuss over the phone with Rheumatology  Continue to observe without any treatment at this time  · Will have her go to for subacute rehab short term      Morbid obesity ([facility] [address] )  Assessment & Plan  · TLC as an outpatient  · Further to discuss with PCP  Transaminitis  Assessment & Plan  · Alkaline phosphatase and ALT and AST improving  Avoid hepatotoxic agents  Liver ultrasound reviewed    Frequent headaches  Assessment & Plan  · Sounds like she has migraines , patient is c/o daily headaches since December  Patient is also complaining of blurry vision that has been also going since December  · Patient with elevated ESR but the blurry vision and the headache has been stable since December-and showed the patient needed to be started on steroids given the prolonged course of headache and visual changes    · Discussed with vascular surgery to see if she benefit from temporal artery biopsy  · Patient reported that she was supposed to see the Neurology as an outpatient due to headache and vision changes and weakness-neurology consultation pending  · Denies any new neurologic symptoms denies pain with chewing, denies jaw claudication, reports blurry vision has been going on at least a year or so and was evaluated by PCP and was thought to be due to diabetes  No previous history of giant cell arteritis  · Patient reported that today she does not have any pain  she is also reluctant to start steroids due to her diabetes  · MRA BRAIN reviewed with the patient    Prolonged QT interval  Assessment & Plan  · qtc 487  · Avoid QT prolonging medications  · Denies any cardiac symptoms including palpitations chest pain shortness of breath exertion    Hyperlipidemia  Assessment & Plan  · Resume statin  · Transaminitis improved, alkaline phosphatase is up trending  · Monitor  · Ultrasound reviewed    Anemia  Assessment & Plan  · Chronic microcytic but keeps dropping from previous - check iron panel and ferritin , fecal occult- no report of bleeding  · This is most likely anemia of chronic disease  · Denies melena hematochezia hematemesis  · HB STABLE AT 8 5    Essential hypertension  Assessment & Plan  · Blood pressure acceptable  · Continue with the current care   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, with long-term current use of insulin [facility])  Assessment & Plan  Lab Results   Component Value Date    HGBA1C 14 6 (H) 08/22/2019       Recent Labs     09/22/19  2021 09/23/19  0600 09/23/19  1122 09/23/19  1542   POCGLU 179* 190* 204* 205*       Blood Sugar Average: Last 72 hrs:  · Reported that she uses 40 units of Lantus b i d  And 30-35 units of Humalog with meals     · Will adjust the insulin and monitor  · Appropriate consultation in regards to urgently needed lifestyle nutritional modification of been discussed,      Discharging Physician / Practitioner: Devaughn Farmer MD  PCP: Charlie Marina MD  Admission Date:   Admission Orders (From admission, onward)     Ordered        09/16/19 1701  Inpatient Admission  Once                   Discharge Date: 09/23/19    Resolved Problems  Date Reviewed: 9/23/2019    None          Consultations During Hospital Stay:  · Vascular, Heme-Onc    Procedures Performed:     · None    Significant Findings / Test Results:     Positive JUAN screen, elevated ESR and CRP    Incidental Findings:   · None     Test Results Pending at Discharge (will require follow up): · None     Outpatient Tests Requested:  · ESR and CRP in 1 week    Complications:  None    Reason for Admission:  Swelling and pain of right arm    Hospital Course:     Sandy Ospina is a 59 y o  female patient who originally presented to the hospital on 9/16/2019 due to swelling and pain of right arm  Patient presented with initially right arm pain and swelling and weakness which also then extended to the left arm and she was having difficulty moving her arms and they felt stiff and hard and also having severe pain  His found have elevated ESR and CRP  She was ruled out for infectious process and also had CT scan of the arm done and also venous Doppler which was negative for DVT  She was initially placed on antibiotics but they were discontinued as it did not seem to be infectious etiology  Her ESR and CRP started to decrease  She did have a rheumatological workup done which was positive for JUAN   Protein electrophoresis was normal but she still has some labs are pending at this time  I did discuss over the phone with Rheumatology and I will be sending her to rehab and continue to monitor while she is there  She will need to see Rheumatology upon discharge  No steroids were used during was hospital stay as the patient is reluctant to use them due to her diabetes and her visual problems    She did also complain of headaches for which she was evaluated by Neurology and had an MRI of the brain and MRA done  I did inform her of the results including the 2 small aneurysms noted  She will need outpatient follow-up with Neurosurgery on a nonurgent basis    Please see above list of diagnoses and related plan for additional information  Condition at Discharge: good     Discharge Day Visit / Exam:     * Please refer to separate progress note for these details *    Discussion with Family:  None    Discharge instructions/Information to patient and family:   See after visit summary for information provided to patient and family  Provisions for Follow-Up Care:  See after visit summary for information related to follow-up care and any pertinent home health orders  Disposition:     Other [facility] at [address] [place] [address], [facility] to [facility]:   · Not Applicable to this Patient - Not Applicable to this Patient    Planned Readmission:  None     Discharge Statement:  I spent 35 minutes discharging the patient  This time was spent on the day of discharge  I had direct contact with the patient on the day of discharge  Greater than 50% of the total time was spent examining patient, answering all patient questions, arranging and discussing plan of care with patient as well as directly providing post-discharge instructions  Additional time then spent on discharge activities  Discharge Medications:  See after visit summary for reconciled discharge medications provided to patient and family        ** Please Note: This note has been constructed using a voice recognition system **

## 2019-09-23 NOTE — H&P
H&P- Sophie Brumfield 1960, 59 y o  female MRN: [id number]    Unit/Bed#: -96 Encounter: [id number]    Primary Care Provider: Geoffrey Salgado MD   Date and time admitted to hospital: 9/23/2019  6:24 PM        * Other inflammatory and immune myopathies, not elsewhere classified  Assessment & Plan  Patient was recently evaluated for bilateral upper extremity swelling tenderness and weakness  Rheumatological workup was ordered  Will check a ESR and a CRP in 1 week  Outpatient follow-up with Rheumatology being arranged as well  No interventions at this time other than physical therapy and occupational therapy  Patient has a repeat flare and worsening of her inflammatory markers may be a candidate for short course of steroids    Is unclear whether she had polymyalgia rheumatica flare versus myositis versus other rheumatological etiology  Continue p r n  Oxy IR for upper extremity pain    Frequent headaches  Assessment & Plan  Patient probably has migraine headaches  Also found to have small aneurysms present on MRA of the head and neck  Continue conservative management for now    Mild intermittent asthma with exacerbation  Assessment & Plan  Currently not in exacerbation  Continue home regimen    Class 3 severe obesity with serious comorbidity and body mass index (BMI) of 45 0 to 49 9 in adult [facility])  Assessment & Plan  BMI is 45 66  Counseled on diet exercise and lifestyle modification    Hyperlipidemia  Assessment & Plan  Continue statins    Essential hypertension  Assessment & Plan  Continue Cardizem CD    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, with long-term current use of insulin [facility])  Assessment & Plan  Lab Results   Component Value Date    HGBA1C 14 6 (H) 08/22/2019       Recent Labs     09/22/19  2021 09/23/19  0600 09/23/19  1122 09/23/19  1542   POCGLU 179* 190* 204* 205*       Blood Sugar Average: Last 72 hrs:   improving control    Continue Lantus and insulin sliding scale with NovoLog standing dose  Also continue Neurontin for neuropathic pain      VTE Prophylaxis: Enoxaparin (Lovenox)  / reason for no mechanical VTE prophylaxis Encourage ambulation   Code Status:  Full code as per the patient  POLST: There is no POLST form on file for this patient (pre-hospital)  Discussion with family:  None    Anticipated Length of Stay:  Patient will be admitted on an SNF Short Term Inpatient basis with an anticipated length of stay of  more than 2 midnights  Justification for Hospital Stay:  Pain swelling and inflammation of both arms    Total Time for Visit, including Counseling / Coordination of Care: 1 hour  Greater than 50% of this total time spent on direct patient counseling and coordination of care  Chief Complaint:   Pain swelling and weakness and inflammation of both arms    History of Present Illness:    Sandy Ospina is a 59 y o  female who presents with pain swelling inflammation and weakness of both arms  Patient was recently evaluated on the medical floor for worsening pain swelling and weakness of both arms  She is gradually improving with slight improvement in her inflammatory markers noted  She lives by herself and was independent before this episode occurred  Now she has difficulty with upper arm strength and mobility and is here for course of rehab    Review of Systems:    Review of Systems   Constitutional: Positive for fatigue  Negative for appetite change, chills and fever  HENT: Negative for hearing loss, sore throat and trouble swallowing  Eyes: Negative for photophobia, discharge and visual disturbance  Respiratory: Negative for chest tightness and shortness of breath  Cardiovascular: Negative for chest pain and palpitations  Gastrointestinal: Negative for abdominal pain, blood in stool and vomiting  Endocrine: Negative for polydipsia and polyuria     Genitourinary: Negative for difficulty urinating, dysuria, flank pain and hematuria  Musculoskeletal: Positive for arthralgias and myalgias  Negative for back pain, gait problem and joint swelling  Skin: Negative for rash  Allergic/Immunologic: Negative for environmental allergies and food allergies  Neurological: Positive for weakness and headaches  Negative for dizziness, seizures and syncope  Hematological: Does not bruise/bleed easily  Psychiatric/Behavioral: Negative for behavioral problems  All other systems reviewed and are negative  Past Medical and Surgical History:     Past Medical History:   Diagnosis Date    Anemia     Arthritis     Diabetes mellitus ([facility] )     Dyspnea on exertion     Hyperlipidemia     Hypertension     Legally blind 2010    Mild intermittent asthma with exacerbation 8/4/2018    Miscarriage     x 3    Seizures (HCC)     Sickle cell trait ([facility] )     Sleep apnea        Past Surgical History:   Procedure Laterality Date    CATARACT EXTRACTION Bilateral     august and september    EYE SURGERY      HYSTERECTOMY      39    OOPHORECTOMY Left     39       Meds/Allergies:    Prior to Admission medications    Medication Sig Start Date End Date Taking? Authorizing Provider   albuterol (PROVENTIL HFA) 90 mcg/act inhaler Inhale 2 puffs every 6 (six) hours as needed for wheezing For another 24 hours use every 4 hours standing 8/6/18   Jackie Hough MD   aspirin 81 MG tablet Take 1 tablet by mouth daily 12/11/17   Historical Provider, MD   atorvastatin (LIPITOR) 40 mg tablet Take 1 tablet (40 mg total) by mouth daily 7/9/19   MELVA MartinAGLAR KWIKPEN 100 units/mL injection pen 40 units twice per day 8/22/19   Eden Jacinto PA-C   SARAH MICROLET LANCETS lancets Inject 1 applicator into the skin 4 (four) times a day 7/27/11   Historical Provider, MD   Blood Glucose Monitoring Suppl (CONTOUR NEXT MONITOR) w/Device KIT Disp 1 meter dx E11 9, may submitted any contour next meter   If not covered let us know we can change strips 7/8/19   Quirino Dao PA-C   Blood Pressure Monitor KIT Check blood pressures BID 6/26/18   Braxton Vazquez DO   budesonide-formoterol (SYMBICORT) 80-4 5 MCG/ACT inhaler Inhale 2 puffs 2 (two) times a day Rinse mouth after use  Patient taking differently: Inhale 2 puffs as needed Rinse mouth after use  10/26/18   En Hobson MD   chlorthalidone 25 mg tablet Take 1 tablet (25 mg total) by mouth daily 3/14/19   Kathe Moser MD   Cholecalciferol (VITAMIN D3) 3000 units TABS Take by mouth daily     Historical Provider, MD   CONTOUR NEXT TEST test strip Test blood sugar 3x per day dx E11 9 7/8/19   Eden Jacinto PA-C   diltiazem (TIAZAC) 420 MG 24 hr capsule Take 1 capsule (420 mg total) by mouth daily 6/20/19   Kathe Moser MD   gabapentin (NEURONTIN) 100 mg capsule Take 1 capsule (100 mg total) by mouth 2 (two) times a day 1/14/19   Lisa Santamaria MD   insulin lispro (HUMALOG KWIKPEN) 100 units/mL injection pen 35 units before meals or 40 if BG over 200 8/22/19   Eden Jacinto PA-C   Insulin Pen Needle (BD PEN NEEDLE DERRICK U/F) 32G X 4 MM MISC Inject 1 applicator under the skin 4 (four) times a day 11/2/16   Historical Provider, MD   losartan (COZAAR) 100 MG tablet Take 1 tablet (100 mg total) by mouth daily 3/14/19   Kathe Moser MD   metFORMIN (GLUCOPHAGE) 500 mg tablet Take 2 tablets (1,000 mg total) by mouth 2 (two) times a day with meals for 90 days 2 tabs daily with supper 8/22/19 11/20/19  Quirino Dao PA-C     I have reviewed home medications with patient personally      Allergies: No Known Allergies    Social History:     Marital Status: /Civil Union   Social History     Substance and Sexual Activity   Alcohol Use Never    Frequency: Never     Social History     Tobacco Use   Smoking Status Never Smoker   Smokeless Tobacco Never Used     Social History     Substance and Sexual Activity   Drug Use No       Family History:    Family History   Problem Relation Age of Onset    Heart attack Mother     Heart disease Mother     Hypertension Mother     No Known Problems Father     Heart disease Sister     No Known Problems Brother     No Known Problems Son     No Known Problems Daughter     Heart attack Maternal Grandmother     Heart disease Maternal Grandmother     Diabetes Other     Heart attack Other     No Known Problems Sister     No Known Problems Sister     No Known Problems Paternal Aunt     No Known Problems Son     Cancer Neg Hx     Stroke Neg Hx        Physical Exam:     Vitals:   Blood Pressure: 152/73 (09/23/19 1817)  Pulse: 95 (09/23/19 1817)  Temperature: (!) 97 °F (36 1 °C) (09/23/19 1815)  Temp Source: Temporal (09/23/19 1815)  Respirations: 20 (09/23/19 1815)  Height: 5' 8" (172 7 cm) (09/23/19 1815)  Weight - Scale: (!) 138 kg (304 lb 14 3 oz) (09/23/19 1815)  SpO2: 94 % (09/23/19 1815)    Physical Exam   Constitutional: She is oriented to person, place, and time  She appears well-developed and well-nourished  HENT:   Head: Normocephalic and atraumatic  Right Ear: External ear normal    Left Ear: External ear normal    Mouth/Throat: Oropharynx is clear and moist    Eyes: Pupils are equal, round, and reactive to light  Conjunctivae and EOM are normal    Neck: Normal range of motion  Neck supple  Cardiovascular: Normal rate, regular rhythm, normal heart sounds and intact distal pulses  Pulmonary/Chest: Effort normal and breath sounds normal    Abdominal: Soft  Bowel sounds are normal  She exhibits no mass  There is no tenderness  There is no rebound and no guarding  Genitourinary:   Genitourinary Comments: deferred   Musculoskeletal:   Swelling of bilateral upper extremity noted with left worse than the right  No tenderness on movement noted now but she does have weakness and proximal muscles and also and distal muscles with weakness of hands as well  Power is 4 /5   Neurological: She is alert and oriented to person, place, and time   She has normal reflexes  Cranial nerves 2-12 are normal   Normal neurological exam   Skin: Skin is warm and dry  No rash noted  Psychiatric: She has a normal mood and affect  Nursing note and vitals reviewed  Additional Data:     Lab Results: I have personally reviewed pertinent reports  Results from last 7 days   Lab Units 09/23/19  0544   WBC Thousand/uL 12 90*   HEMOGLOBIN g/dL 8 5*   HEMATOCRIT % 27 2*   PLATELETS Thousands/uL 740*   NEUTROS PCT % 73*   LYMPHS PCT % 19*   MONOS PCT % 4   EOS PCT % 3     Results from last 7 days   Lab Units 09/23/19  0544   SODIUM mmol/L 140   POTASSIUM mmol/L 4 1   CHLORIDE mmol/L 102   CO2 mmol/L 30   BUN mg/dL 15   CREATININE mg/dL 0 77   ANION GAP mmol/L 8   CALCIUM mg/dL 9 2   ALBUMIN g/dL 3 3   TOTAL BILIRUBIN mg/dL 0 40   ALK PHOS U/L 264*   ALT U/L 26   AST U/L 26   GLUCOSE RANDOM mg/dL 181*         Results from last 7 days   Lab Units 09/23/19  1542 09/23/19  1122 09/23/19  0600 09/22/19  2021 09/22/19  1552 09/22/19  1125 09/22/19  0548 09/21/19  2018 09/21/19  1600 09/21/19  1127 09/21/19  0529 09/20/19 2028   POC GLUCOSE mg/dl 205* 204* 190* 179* 187* 214* 228* 310* 225* 200* 178* 183*         Results from last 7 days   Lab Units 09/20/19  1613   PROCALCITONIN ng/ml 0 15       Imaging: I have personally reviewed pertinent reports  No orders to display       EKG, Pathology, and Other Studies Reviewed on Admission:   · EKG:  Reviewed    [facility] Yes     ** Please Note: This note has been constructed using a voice recognition system   **

## 2019-09-23 NOTE — SOCIAL WORK
LOS: 7 GMLOS: 3 3    MITZI called [facility] ([phone]) to build a case/ submit for authorization for pt to go to [facility]  MITZI spoke w/ Alexandrea  Per Winter Carrero, patient does not need to receive authorization before transferring to [facility]  Per Winter Carrero accepting facility is to fax clinical with in 24 hours of being transferred to SNF/ STR facility  MITZI clarified that pt would be able to transfer to [facility] before receiving an authorization number and approval  Winter Bonds confirmed that pt does not need authorization before transferring to [facility]  Because discharge date is unknown, Winter Carrero stated that he would set it for 9/24 until d/c date is known  [facility]/ a reference number for this [facility] # [id number]  [facility] provided a fax number for clinical to be sent to (F: [phone]) sent to the attention of Clinical Review  MITZI discussed regarding with [facility] director Heladio Trinh and  clinical coordinator  MITZI faxed clinical to [facility] at 4:18pm/ 20:18 on 9/21/19

## 2019-09-24 LAB
B BURGDOR DNA SPEC QL NAA+PROBE: NEGATIVE
BACTERIA BLD CULT: NORMAL
BACTERIA BLD CULT: NORMAL
C-ANCA TITR SER IF: NORMAL TITER
CH50 SERPL-ACNC: >60 U/ML (ref 42–999999)
EPO SERPL-ACNC: 30.9 MIU/ML (ref 2.6–18.5)
GLUCOSE SERPL-MCNC: 181 MG/DL (ref 65–140)
GLUCOSE SERPL-MCNC: 185 MG/DL (ref 65–140)
GLUCOSE SERPL-MCNC: 191 MG/DL (ref 65–140)
GLUCOSE SERPL-MCNC: 246 MG/DL (ref 65–140)
KAPPA LC FREE SER-MCNC: 41.1 MG/L (ref 3.3–19.4)
KAPPA LC FREE/LAMBDA FREE SER: 1.16 {RATIO} (ref 0.26–1.65)
LAMBDA LC FREE SERPL-MCNC: 35.4 MG/L (ref 5.7–26.3)
MYELOPEROXIDASE AB SER IA-ACNC: <9 U/ML (ref 0–9)
P-ANCA ATYPICAL TITR SER IF: NORMAL TITER
P-ANCA TITR SER IF: NORMAL TITER
PROTEINASE3 AB SER IA-ACNC: <3.5 U/ML (ref 0–3.5)

## 2019-09-24 PROCEDURE — G8979 MOBILITY GOAL STATUS: HCPCS

## 2019-09-24 PROCEDURE — 97530 THERAPEUTIC ACTIVITIES: CPT

## 2019-09-24 PROCEDURE — 82948 REAGENT STRIP/BLOOD GLUCOSE: CPT

## 2019-09-24 PROCEDURE — 97166 OT EVAL MOD COMPLEX 45 MIN: CPT

## 2019-09-24 PROCEDURE — G8978 MOBILITY CURRENT STATUS: HCPCS

## 2019-09-24 PROCEDURE — 97162 PT EVAL MOD COMPLEX 30 MIN: CPT

## 2019-09-24 PROCEDURE — 97535 SELF CARE MNGMENT TRAINING: CPT

## 2019-09-24 RX ORDER — INSULIN GLARGINE 100 [IU]/ML
50 INJECTION, SOLUTION SUBCUTANEOUS
COMMUNITY
End: 2019-09-30 | Stop reason: HOSPADM

## 2019-09-24 RX ORDER — DILTIAZEM HYDROCHLORIDE 240 MG/1
420 CAPSULE, COATED, EXTENDED RELEASE ORAL DAILY
COMMUNITY
End: 2019-09-30 | Stop reason: HOSPADM

## 2019-09-24 RX ADMIN — INSULIN GLARGINE 52 UNITS: 100 INJECTION, SOLUTION SUBCUTANEOUS at 20:41

## 2019-09-24 RX ADMIN — INSULIN LISPRO 18 UNITS: 100 INJECTION, SOLUTION INTRAVENOUS; SUBCUTANEOUS at 12:07

## 2019-09-24 RX ADMIN — INSULIN LISPRO 2 UNITS: 100 INJECTION, SOLUTION INTRAVENOUS; SUBCUTANEOUS at 07:38

## 2019-09-24 RX ADMIN — INSULIN LISPRO 4 UNITS: 100 INJECTION, SOLUTION INTRAVENOUS; SUBCUTANEOUS at 17:04

## 2019-09-24 RX ADMIN — GABAPENTIN 100 MG: 100 CAPSULE ORAL at 08:46

## 2019-09-24 RX ADMIN — BUDESONIDE AND FORMOTEROL FUMARATE DIHYDRATE 2 PUFF: 80; 4.5 AEROSOL RESPIRATORY (INHALATION) at 08:47

## 2019-09-24 RX ADMIN — ENOXAPARIN SODIUM 40 MG: 40 INJECTION SUBCUTANEOUS at 08:48

## 2019-09-24 RX ADMIN — ASPIRIN 81 MG 81 MG: 81 TABLET ORAL at 08:46

## 2019-09-24 RX ADMIN — INSULIN LISPRO 18 UNITS: 100 INJECTION, SOLUTION INTRAVENOUS; SUBCUTANEOUS at 17:04

## 2019-09-24 RX ADMIN — INSULIN LISPRO 2 UNITS: 100 INJECTION, SOLUTION INTRAVENOUS; SUBCUTANEOUS at 12:07

## 2019-09-24 RX ADMIN — INSULIN LISPRO 18 UNITS: 100 INJECTION, SOLUTION INTRAVENOUS; SUBCUTANEOUS at 07:37

## 2019-09-24 RX ADMIN — BUDESONIDE AND FORMOTEROL FUMARATE DIHYDRATE 2 PUFF: 80; 4.5 AEROSOL RESPIRATORY (INHALATION) at 20:41

## 2019-09-24 RX ADMIN — INSULIN GLARGINE 52 UNITS: 100 INJECTION, SOLUTION SUBCUTANEOUS at 08:47

## 2019-09-24 RX ADMIN — LOSARTAN POTASSIUM 100 MG: 50 TABLET, FILM COATED ORAL at 08:46

## 2019-09-24 RX ADMIN — GABAPENTIN 100 MG: 100 CAPSULE ORAL at 17:04

## 2019-09-24 RX ADMIN — DILTIAZEM HYDROCHLORIDE 420 MG: 180 CAPSULE, COATED, EXTENDED RELEASE ORAL at 08:47

## 2019-09-24 RX ADMIN — ATORVASTATIN CALCIUM 40 MG: 40 TABLET, FILM COATED ORAL at 08:46

## 2019-09-24 NOTE — PHYSICAL THERAPY NOTE
Physical Therapy Evaluation and Treatment Note    Time In/Out:0873-4721    Patient's Name: Jonny Magaña    Admitting Diagnosis  Decreased mobility [R26 89]    Problem List  Patient Active Problem List   Diagnosis    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, with long-term current use of insulin (HCC)    Essential hypertension    Seizures (HCC)    Exertional dyspnea    Enlarged pulmonary artery (HCC)    Aortic dilatation (HCC)    Anemia    Decreased pulses in feet    Diabetes mellitus type 2 with complications, uncontrolled (HCC)    Hyperlipidemia    Microalbuminuria    Mild obstructive sleep apnea    Class 3 severe obesity with serious comorbidity and body mass index (BMI) of 45 0 to 49 9 in [facility])    Peripheral neuropathy    Prolonged QT interval    Visual impairment in both eyes    Vitamin D deficiency    Lung nodules    Orthostatic hypotension    Mild intermittent asthma with exacerbation    Type 2 diabetes mellitus with microalbuminuria, with long-term current use of insulin (HCC)    Frequent headaches    Other inflammatory and immune myopathies, not elsewhere classified    Transaminitis    Morbid obesity ([address] )       Past Medical History  Past Medical History:   Diagnosis Date    Anemia     Arthritis     Diabetes mellitus ([address] )     Dyspnea on exertion     Hyperlipidemia     Hypertension     Legally blind 2010    Mild intermittent asthma with exacerbation 8/4/2018    Miscarriage     x 3    Seizures (HCC)     Sickle cell trait ([address] )     Sleep apnea        Past Surgical History  Past Surgical History:   Procedure Laterality Date    CATARACT EXTRACTION Bilateral     august and september    EYE SURGERY      HYSTERECTOMY      39    OOPHORECTOMY Left     39        09/24/19 1500   Note Type   Note type Eval/Treat   Pain Assessment   Pain Assessment 0-10   Pain Score 7   Pain Type Acute pain   Pain Location Hip   Pain Orientation Right;Left  (in WB reports left discomfort > than right)   Pain Descriptors Aching  (Stiffness)   Pain Frequency Intermittent  (especially when walking)   Patient's Stated Pain Goal No pain   Home Living   Type of Home Apartment  ([address] [place] [address] no elevator)   Home Layout   (3 JESSICA (R) HR+ 6 (L) HR + 6 (L) HR)   Home Equipment Cane;Walker  (RW)   Prior Function   Level of [place] Independent with ADLs and functional mobility  (used SPC for ambulation to go to work; otherwise no AD)   Lives With [employer]  (per patient she does not want spouse living there )   Receives Help From Friend(s)  (son with groceries and shopping; Alley Lee -friend)   Falls in the last 6 months 0   Vocational On disability  (started working again 3 years again full- time)   Comments Legally blind so can work and be on disability     Restrictions/Precautions   Weight Bearing Precautions Per Order No   Other Precautions Pain;Visual impairment  (legally blind)   General   Family/Caregiver Present No   Cognition   Overall Cognitive Status WFL   Arousal/Participation Alert   Attention Within functional limits   Orientation Level Oriented X4   Following Commands Follows all commands and directions without difficulty   RUE Assessment   RUE Assessment WFL  (decreased grasp)   LUE Assessment   LUE Assessment WFL  (decreased grasp)   RLE Assessment   RLE Assessment WFL   Strength RLE   R Hip Flexion 4-/5   R Hip ABduction 3+/5  (break test seated)   R Hip ADduction 4-/5  (break test seated)   R Knee Extension 4-/5   R Ankle Dorsiflexion 4+/5   R Ankle Plantar Flexion 2+/5   LLE Assessment   LLE Assessment WFL   Light Touch   RLE Light Touch Grossly intact   LLE Light Touch Grossly intact   Bed Mobility   Supine to Sit 6  Modified independent   Sit to Supine 6  Modified independent   Transfers   Sit to Stand 5  Supervision   Additional items Increased time required;Verbal cues  (raised bed)   Stand to Sit   (CGA to low chair at bedside)   Additional items Increased time required   Stand pivot 5  Supervision   Additional items   (no AD, declines use of RW)   Toilet transfer 5  Supervision   Additional items Increased time required  (rails around toilet)   Ambulation/Elevation   Gait pattern Short stride; Excessively slow; Antalgic; Wide EUGENIE; Decreased foot clearance  (lateral trunk flexion in left stance)   Gait Assistance 5  Supervision   Additional items Assist x 1   Assistive Device None  (declined use of AD)   Distance 22 feet   Stair Management Assistance   (pt declined due to fatigue/pain)   Balance   Static Sitting Normal   Static Standing Fair   Ambulatory Fair   Endurance Deficit   Endurance Deficit Yes   Activity Tolerance   Activity Tolerance Patient limited by pain; Patient limited by fatigue   Nurse Made Aware RN clears for evaluation/treatment   Assessment   Prognosis Good   Problem List Decreased strength;Decreased range of motion;Decreased endurance;Decreased mobility;Obesity;Pain   Assessment Madisyn Mon was admitted from [facility] to [facility] unit on 9/23/19 for rehab  Pt presented with swelling inflammation and weakness of both arms/hands  Inflammatory markers/SED rate noted to be elevated Pt is to follow up with rheumatology upon discharge and patient reports an appointment has been scheduled for her already on Oct  9  Due to swelling of both hands and decreased upper arm strength and ROM she was admitted for course of rehab  PMH significant for migraines, DM II, HLD, Morbid Obesity, enlarged pulmonary artery, MOHAN, peripheral neuropathy, orthostatic hypotension, aortic dilatation, exertional dyspnea, seizures  Order for Physical therapy received   Pt presents with c/o pain in both hip/groin region which she believes is related to the amount of walking that she had done yesterday when seen in the hospital   Her pain and swelling in the wrists has significantly decreased as this therapist had seen patient during her hospital stay for application of tubigrip to both hands for edema control  Pt does report mild sense of achiness in hands when pushing up for room chair with wooden arms  She is noted to have weak grasp of both hands although she is now fully able to make fist   During her hospital stay she was unable to close hands due to swelling  Her gait is abnormal with noted lateral trunk flexion to the left in left stance which may be related to c/o left hip pain  Pt reports that she felt she walked better prior to admission with less upper trunk lateral flexion  Pt RLE noted to be decreased in hip and knee flexion compared to left which was 4+/5  Pt will benefit from skilled PT to increase functional mobility to allow for safe discharge to home  Pt has FF of steps to enter apt  PTA patient independent without AD taking SPC only when going to work  In summary the patient's history, examination of body system(s), activity limitations, participation restrictions, and collaboration of care are the guiding factors that were used to determine the clinical decision  The clinical presentation is evolving and therefore the assigned level of complexity is: Moderate  Goals   Patient Goals "to walk and get back to work  STG Expiration Date 10/01/19   Short Term Goal #1 1  Pt will demonstrate mod  Waite with all functional transfers  2  Pt will ambulate at least 150 feet with least restrictive AD to allow for safe mobility in home and limited community distances  3  Pt will ascend and descend FF of steps with rail mod  Independent to allow for safe entry into home  4  Pt will demonstrate good static and dynamic standing balance to decrease fall risk obtained by functional an/or objective measures  LTG Expiration Date   (see STT; STG=LTG)   PT Treatment Day 1   Plan   Treatment/Interventions Functional transfer training;LE strengthening/ROM; Elevations; Therapeutic exercise; Endurance training;Patient/family training;Equipment eval/education;Gait training;Spoke to nursing   PT Frequency Other (Comment)  (6x/week)   Recommendation   Equipment Recommended Other (Comment)  (no needs)   Barthel Index   Feeding 10   Bathing 0   Grooming Score 5   Dressing Score 5   Bladder Score 10   Bowels Score 10   Toilet Use Score 10   Transfers (Bed/Chair) Score 10   Mobility (Level Surface) Score 0   Stairs Score 0   Barthel Index Score 60       ________________________________________________________    PT Treatment Note    Time In/Out: 3435-1225    S: " They said that I tested + for lupus " Per discharge summary MD note state: Is unclear whether she had polymyalgia rheumatica flare versus myositis versus other rheumatological etiology  O: Gait training without  feet with CS  VC for positioning with sit to stand transfers from low surface  Education in [facility] during hospital stay  DS with toilet transfers  Vitals Seated /68, HR 87 bpm, spO2 97%; Standing /65, HR 95 bpm, SPO2 97%; Post ambulation /67,  bpm, SpO2 98%  A: Lateral trunk flexion to left may be due to left hip pain, decreased strength abductors as well as size  Pt gait was stable though abnormal without AD  Education on conservation of joints/muscles when pain is present and use of AD as needed especially in light of elevated SED rate  Pt expresses understanding of education  Pt is motivated to increase functional mobility to allow for safe discharge  Expresses more fatigue and pain today in legs  P: continue to follow to address goals per VIKI Huff, PT

## 2019-09-24 NOTE — PROGRESS NOTES
Medication Regimen Review (MRR)    To promote positive health outcomes and reduce adverse consequences the patient's medication therapy has been reviewed by a pharmacist for the following potential problems:   1   documented indication and therapeutic benefits  2   appropriate dose, frequency, route, and duration of therapy  3   medication interactions, side effects, and allergies  4   medication or transcription errors  Medications are also reviewed for appropriate monitoring, duplicate therapy, and dose reduction  Based on the review please see the following recommendations  Patient information:    The patient is 59 y o  admitted for fatigue, gait problem, inflammatory and immune myopoathies, diabetes, hypertension, headaches, hyperlipidemia, and asthma  Wt Readings from Last 1 Encounters:   09/23/19 (!) 138 kg (304 lb 14 3 oz)       Lab Results   Component Value Date    GLUCOSE 135 01/04/2016    CALCIUM 9 2 09/23/2019     01/04/2016    K 4 1 09/23/2019    CO2 30 09/23/2019     09/23/2019    BUN 15 09/23/2019    CREATININE 0 77 09/23/2019         Recommendations: There are no recommendations from the pharmacy department at this time      Rolando Burgess, Pharmacist  [phone]

## 2019-09-24 NOTE — PLAN OF CARE
Problem: OCCUPATIONAL THERAPY ADULT  Goal: Performs self-care activities at highest level of function for planned discharge setting  See evaluation for individualized goals  Description  Treatment Interventions: ADL retraining, UE strengthening/ROM, Endurance training, Patient/family training, Equipment evaluation/education, Activityengagement          See flowsheet documentation for full assessment, interventions and recommendations  Note:   Limitation: Decreased ADL status, Decreased UE strength, Decreased endurance, Decreased self-care trans, Decreased high-level ADLs  Prognosis: Good  Assessment: Pt admit to [facility] s/p pain and swelling of UE's w/ unclear etiology  Plan to f/u w/ rheumatology upon D/C  OT completed expanded review of pt's medical and social history  Pt with h/o anemia, arthritis, DM, HLD, HTN, legally blind, and seizures  Prior to admission, pt was living in [address] [place] [address] w/ [facility]  Pt is (I) PLOF and does not have any reliable external support  Decreased social situation as her spouse is currently living w/ her and she doesn't want him too  Pt presents to OT below baseline due to the following performance deficits: strength; edema; pain; functional mobility; self care; and IADLs  Therefore, pt would benefit from OT services to achieve optimal level of performance  Occupational performance areas to be addressed include: bathing, toileting, dressing, activity tolerance, functional mobility, clothing management, meal prep,and home management  Pt w/ c/o increased pain during session  Remains below functional baseline  Based on findings, pt is of moderate complexity  Plan is to return home w/ services        OT Discharge Recommendation: Home OT  OT - OK to Discharge: No

## 2019-09-24 NOTE — UTILIZATION REVIEW
Notification of Discharge  This is a Notification of Discharge from our facility [address]  Please be advised that this patient has been discharge from our facility  Below you will find the admission and discharge date and time including the patients disposition  PRESENTATION DATE: 9/16/2019  9:32 AM  OBS ADMISSION DATE:   IP ADMISSION DATE: 9/16/19 1702   DISCHARGE DATE: 9/23/2019  6:17 PM  DISPOSITION: Released to SNF/TCU/SNU Facility Released to SNF/TCU/SNU Facility   Admission Orders listed below:  Admission Orders (From admission, onward)     Ordered        09/16/19 1701  Inpatient Admission  Once                   Please contact the UR Department if additional information is required to close this patient's authorization/case  [address]  Utilization Review Department  Phone: [phone]; Fax [phone]  [email or web address]  ATTENTION: Please call with any questions or concerns to [phone]  and carefully listen to the prompts so that you are directed to the right person  Send all requests for admission clinical reviews, approved or denied determinations and any other requests to fax [phone]   All voicemails are confidential

## 2019-09-24 NOTE — PLAN OF CARE
Problem: PHYSICAL THERAPY ADULT  Goal: Performs mobility at highest level of function for planned discharge setting  See evaluation for individualized goals  Description  Treatment/Interventions: Functional transfer training, LE strengthening/ROM, Elevations, Therapeutic exercise, Endurance training, Patient/family training, Equipment eval/education, Gait training, Spoke to nursing  Equipment Recommended: Other (Comment)(no needs)       See flowsheet documentation for full assessment, interventions and recommendations  Outcome: Progressing  Note:   Prognosis: Good  Problem List: Decreased strength, Decreased range of motion, Decreased endurance, Decreased mobility, Obesity, Pain  Assessment: Radha Donaldson was admitted from [facility] to [facility] unit on 9/23/19 for rehab  Pt presented with swelling inflammation and weakness of both arms/hands  Inflammatory markers/SED rate noted to be elevated Pt is to follow up with rheumatology upon discharge and patient reports an appointment has been scheduled for her already on Oct  9  Due to swelling of both hands and decreased upper arm strength and ROM she was admitted for course of rehab  PMH significant for migraines, DM II, HLD, Morbid Obesity, enlarged pulmonary artery, MOHAN, peripheral neuropathy, orthostatic hypotension, aortic dilatation, exertional dyspnea, seizures  Order for Physical therapy received  Pt presents with c/o pain in both hip/groin region which she believes is related to the amount of walking that she had done yesterday when seen in the hospital   Her pain and swelling in the wrists has significantly decreased as this therapist had seen patient during her hospital stay for application of tubigrip to both hands for edema control  Pt does report mild sense of achiness in hands when pushing up for room chair with wooden arms    She is noted to have weak grasp of both hands although she is now fully able to make fist   During her hospital stay she was unable to close hands due to swelling  Her gait is abnormal with noted lateral trunk flexion to the left in left stance which may be related to c/o left hip pain  Pt reports that she felt she walked better prior to admission with less upper trunk lateral flexion  Pt RLE noted to be decreased in hip and knee flexion compared to left which was 4+/5  Pt will benefit from skilled PT to increase functional mobility to allow for safe discharge to home  Pt has FF of steps to enter apt  PTA patient independent without AD taking SPC only when going to work  In summary the patient's history, examination of body system(s), activity limitations, participation restrictions, and collaboration of care are the guiding factors that were used to determine the clinical decision  The clinical presentation is evolving and therefore the assigned level of complexity is: Moderate  See flowsheet documentation for full assessment

## 2019-09-24 NOTE — OCCUPATIONAL THERAPY NOTE
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, with long-term current use of insulin ([facility] )    Essential hypertension    Hyperlipidemia    Class 3 severe obesity with serious comorbidity and body mass index (BMI) of 45 0 to 49 9 in adult ([facility] )    Mild intermittent asthma with exacerbation    Frequent headaches    Past Medical History  Past Medical History:   Diagnosis Date    Anemia     Arthritis     Diabetes mellitus ([facility] )     Dyspnea on exertion     Hyperlipidemia     Hypertension     Legally blind 2010    Mild intermittent asthma with exacerbation 8/4/2018    Miscarriage     x 3    Seizures ([facility]     S[facility] trait [facility]     Sleep apnea      Past Surgical History  Past Surgical History:   Procedure Laterality Date    CATARACT EXTRACTION Bilateral     august and september    EYE SURGERY      HYSTERECTOMY      39    OOPHORECTOMY Left     39     Time- Mod eval + 40 mins tx  Vitals- /67 HR 98  SPO2 98% RA  Standardized Assessment- Barthel 55   Home Evaluation (virtual)- Pt states nobody reliable to provide pictures  Family/Caregiver Training- Pt states nobody reliable to participate in training if needed     Additional Comments- Talkative, decreased social situation, legally blind but see's blurs       09/24/19 1015   Note Type   Note type Eval/Treat   Restrictions/Precautions   Weight Bearing Precautions Per Order No   Other Precautions Pain   Pain Assessment   Pain Assessment 0-10   Pain Score 7   Pain Type Acute pain   Pain Location Groin   Pain Orientation Bilateral   Pain Descriptors Aching   Pain Frequency Intermittent   Patient's Stated Pain Goal No pain   Hospital Pain Intervention(s) Repositioned; Ambulation/increased activity; Distraction; Emotional support   Response to Interventions tolerated session   Home Living   Type of Home Apartment  (2nd fl w/ no elevator )   Home Layout One level  (3 steps w/ L HR + 6 steps w/ R HR + 6 steps w/ R HR)   Bathroom Shower/Tub Tub/shower unit   Bathroom Toilet Standard   Bathroom Equipment Hand-held shower; Shower chair   [address] Cane;Walker   Prior Function   Lives With Spouse;Son  (doesn't want spouse there)   Receives Help From   (family unable to assist )   ADL [address] in the last 6 months 0   Vocational On disability  (however got an exception and started working again )   Comments Pt and decreased social situation involving her   He's currently staying w/ her while he recovers from amputation  However, pt does not want spouse at the house  Does not have any reliale support system  Lifestyle   Autonomy Pt reports being (I) PLOF  Ambulates w/ SPC at times for balance  Uses [employer] for transportation      Reciprocal Relationships son    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   Intrinsic Gratification walking & reading    Psychosocial   Psychosocial (WDL) WDL   Subjective   Subjective "I like to push myself"    ADL   Eating Assistance 7  Independent   Grooming Assistance 7  Independent   UB Bathing Assistance 5  Supervision/Setup   LB Bathing Assistance 5  Supervision/Setup   UB Dressing Assistance 5  Supervision/Setup   LB Dressing Assistance 5  Supervision/Setup   Toileting Assistance  5  Supervision/Setup   Transfers   Sit to Stand 6  Modified independent   Additional items Increased time required   Stand to Sit 5  Supervision   Additional items Increased time required  (uncontrolled descent )   Stand pivot 5  Supervision   Additional items   (without AD)   Functional Mobility   Functional Mobility 5  Supervision   Additional Comments without AD   Balance   Static Sitting Normal Dynamic Sitting Fair +   Static Standing Fair   Dynamic Standing Fair   Ambulatory Fair   Activity Tolerance   Activity Tolerance Patient limited by pain; Patient limited by fatigue   Nurse Made Aware RN cleared for OT eval    RUE Assessment   RUE Assessment WFL  (3+/5 t/o all planes )   LUE Assessment   LUE Assessment WFL  (3+/5 t/o all planes)   Hand Function   Gross Motor Coordination Functional   Fine Motor Coordination Impaired  (but improving )   Sensation   Light Touch No apparent deficits   Vision-Basic Assessment   Current Vision Wears glasses only for reading  (states she's legally blind s/p implants)   Cognition   Overall Cognitive Status WFL   Arousal/Participation Cooperative   Attention Within functional limits   Orientation Level Oriented X4   Memory Within functional limits   Following Commands Follows all commands and directions without difficulty   Assessment   Limitation Decreased ADL status; Decreased UE strength;Decreased endurance;Decreased self-care trans;Decreased high-level ADLs   Prognosis Good   Assessment Pt admit to TCF s/p pain and swelling of UE's w/ unclear etiology  Plan to f/u w/ rheumatology upon D/C  OT completed expanded review of pt's medical and social history  Pt with h/o anemia, arthritis, DM, HLD, HTN, legally blind, and seizures  Prior to admission, pt was living in [address] [place] [address] w/ numerous JESSICA  Pt is (I) PLOF and does not have any reliable external support  Decreased social situation as her spouse is currently living w/ her and she doesn't want him too  Pt presents to OT below baseline due to the following performance deficits: strength; edema; pain; functional mobility; self care; and IADLs  Therefore, pt would benefit from OT services to achieve optimal level of performance  Occupational performance areas to be addressed include: bathing, toileting, dressing, activity tolerance, functional mobility, clothing management, meal prep,and home management   Pt w/ c/o increased pain during session  Remains below functional baseline  Based on findings, pt is of moderate complexity  Plan is to return home w/ services  Goals   Patient Goals "do my basic everyday stuff"    Plan   Treatment Interventions ADL retraining;UE strengthening/ROM; Endurance training;Patient/family training;Equipment evaluation/education; Activityengagement   Goal Expiration Date 10/04/19   OT Treatment Day 1   OT Frequency   (6x/wk for 1 5 wks)   Additional Treatment Session   Start Time 0935   End Time 0622   Treatment Assessment Pt participated in tx s/p evaluation  Pt donned B/L sneakers w/ S and increased time  F+ dynamic sit balance  Sit to stand from recliner w/ MI and good hand placement  Performed functional mobility in room and on unit w/ S without AD  F dynamic stand balance  Reports pain in B/L groin and states "I think I over did it yesterday with my physical therapy " Completed all aspects of toileting w/ DS and increased time due to decreased coordination  Returned to R SYBIL Moreno w/ S  Discussed at length her feeling on her spouse being at home w/ her and her lack of support from him and son  Educated pt on speaking w/ SS about home services and options  OT educated pt on OT POC and role of rehab on TCF  Remains in recliner w/ all needs at end of session      Recommendation   OT Discharge Recommendation Home OT   OT - OK to Discharge No   Barthel Index   Feeding 10   Bathing 0   Grooming Score 5   Dressing Score 5   Bladder Score 10   Bowels Score 10   Toilet Use Score 5   Transfers (Bed/Chair) Score 10   Mobility (Level Surface) Score 0   Stairs Score 0   Barthel Index Score 55       Goals STG achieved within 1 5 weeks Performance at Initial Evaluation Current Performance (last date completed)   Grooming standing  (I) w/ F+ balance DS w/ F balance DS w/ F balance- 9/24   UB ADL (I) (S) (S)- 9/24   LB ADL using AE PRN MI (S) (S)- 9/24   Toileting including hygiene & clothing management  (I) (S) DS- 9/24 Bed mobility w/ HOB flat and no SR to prep for purposeful tasks  MI NA NA   ADL Txfs  MI/I (S) (S)- 9/24   Dynamic and unsupported stand balance for clothing management  F+ F F- 9/24   Tub shower txf using most appropriate method; educate on AE MI NA NA   Kitchen mobility and meal prep  MI NA NA   Household management (laundry/cleaning/bed making) MI NA NA   Activity tolerance for ADLs F+ F- F- 9/24   UE strength 4-/5 3+/5 3+/5- 9/24     [facility], OTR/L      ________________________________________        _________________________________________

## 2019-09-24 NOTE — PROGRESS NOTES
RECREATIONAL THERAPY PARTICIPATION LOG      ACTIVITY:    GAMES:        BINGO:        MUSIC STIM:        ARTS & CRAFTS:        EXERCISE:        CLUBS & MEETING:        SOCIALS: Resident participated in a small group Lunch [place] in the [facility] today  During the [employer], resident shared about her pride in her country of [place] and unity that all [place] feel  She detailed the history of the beginning of [place]  Her identity comes from who she believes that she is and she stated that she does not let other people tell her who she is      MUSA Talavera Sheets:        INDEPENDENT:        1:1:

## 2019-09-24 NOTE — PLAN OF CARE
Problem: PAIN - ADULT  Goal: Verbalizes/displays adequate comfort level or baseline comfort level  Description  Interventions:  - Encourage patient to monitor pain and request assistance  - Assess pain using appropriate pain scale  - Administer analgesics based on type and severity of pain and evaluate response  - Implement non-pharmacological measures as appropriate and evaluate response  - Consider cultural and social influences on pain and pain management  - Notify physician/advanced practitioner if interventions unsuccessful or patient reports new pain  Outcome: Progressing     Problem: SAFETY ADULT  Goal: Patient will remain free of falls  Description  INTERVENTIONS:  - Assess patient frequently for physical needs  -  Identify cognitive and physical deficits and behaviors that affect risk of falls    -  [place] fall precautions as indicated by assessment   - Educate patient/family on patient safety including physical limitations  - Instruct patient to call for assistance with activity based on assessment  - Modify environment to reduce risk of injury  - Consider OT/PT consult to assist with strengthening/mobility  Outcome: Progressing  Goal: Maintain or return to baseline ADL function  Description  INTERVENTIONS:  -  Assess patient's ability to carry out ADLs; assess patient's baseline for ADL function and identify physical deficits which impact ability to perform ADLs (bathing, care of mouth/teeth, toileting, grooming, dressing, etc )  - Assess/evaluate cause of self-care deficits   - Assess range of motion  - Assess patient's mobility; develop plan if impaired  - Assess patient's need for assistive devices and provide as appropriate  - Encourage maximum independence but intervene and supervise when necessary  - Involve family in performance of ADLs  - Assess for home care needs following discharge   - Consider OT consult to assist with ADL evaluation and planning for discharge  - Provide patient education as appropriate  Outcome: Progressing  Goal: Maintain or return mobility status to optimal level  Description  INTERVENTIONS:  - Assess patient's baseline mobility status (ambulation, transfers, stairs, etc )    - Identify cognitive and physical deficits and behaviors that affect mobility  - Identify mobility aids required to assist with transfers and/or ambulation (gait belt, sit-to-stand, lift, walker, cane, etc )  - [place] fall precautions as indicated by assessment  - Record patient progress and toleration of activity level on Mobility SBAR; progress patient to next Phase/Stage  - Instruct patient to call for assistance with activity based on assessment  - Consider rehabilitation consult to assist with strengthening/weightbearing, etc   Outcome: Progressing     Problem: DISCHARGE PLANNING  Goal: Discharge to home or other facility with appropriate resources  Description  INTERVENTIONS:  - Identify barriers to discharge w/patient and caregiver  - Arrange for needed discharge resources and transportation as appropriate  - Identify discharge learning needs (meds, wound care, etc )  - Arrange for interpretive services to assist at discharge as needed  - Refer to Case Management Department for coordinating discharge planning if the patient needs post-hospital services based on physician/advanced practitioner order or complex needs related to functional status, cognitive ability, or social support system  Outcome: Progressing     Problem: Knowledge Deficit  Goal: Patient/family/caregiver demonstrates understanding of disease process, treatment plan, medications, and discharge instructions  Description  Complete learning assessment and assess knowledge base    Interventions:  - Provide teaching at level of understanding  - Provide teaching via preferred learning methods  Outcome: Progressing     Problem: Potential for Falls  Goal: Patient will remain free of falls  Description  INTERVENTIONS:  - Assess patient frequently for physical needs  -  Identify cognitive and physical deficits and behaviors that affect risk of falls    -  [place] fall precautions as indicated by assessment   - Educate patient/family on patient safety including physical limitations  - Instruct patient to call for assistance with activity based on assessment  - Modify environment to reduce risk of injury  - Consider OT/PT consult to assist with strengthening/mobility  Outcome: Progressing

## 2019-09-24 NOTE — SOCIAL WORK
LOS: 1, Bundle: No, 30 day readmission: No  Primary Insurance: [employer]   Secondary Insurance: Medicare     Patient admitted with swelling and inflammation and weakness of her arms  Pt will need f/u rheumatology  SW met with patient and reviewed paperwork and consents  Consents signed, patient reports she has a living will at home and no one would no where to find it  She is interested in completing new POA/living will paperwork on TCF  SW will provide copies  Patient is agreeable to a [facility] with herself and friend [facility]  SW schedule for tomorrow at 9:00 AM, team notified  Prior to hospitalization, patient lived in a second floor apartment with 3 JESSICA and 6 + 6 JESSICA to enter apartment  He was independent with ADLs and ambulated without an assistive device   She owns a SPC and CPAP (from [employer])  Patient does not have any services in the home currently  Her PCP is Dr Robby Slade and pharmacy is [facility]  Patient's  and son are home 24/7 however per previous SW note pt is hoping to have her  evicted  SW will continue to follow at this time  Patient reports she provided disability paperwork to Dr Sena Haddad  She reports she will need this before 9/30  SW discussed and Dr Sena Haddad will work on paperwork  SW will continue to follow at this time

## 2019-09-25 ENCOUNTER — TRANSITIONAL CARE MANAGEMENT (OUTPATIENT)
Dept: INTERNAL MEDICINE CLINIC | Facility: CLINIC | Age: 59
End: 2019-09-25

## 2019-09-25 LAB
GLUCOSE SERPL-MCNC: 128 MG/DL (ref 65–140)
GLUCOSE SERPL-MCNC: 132 MG/DL (ref 65–140)
GLUCOSE SERPL-MCNC: 140 MG/DL (ref 65–140)
GLUCOSE SERPL-MCNC: 255 MG/DL (ref 65–140)
HU1 AB TITR SER: NORMAL TITER
HU2 AB TITR SER IF: NORMAL TITER

## 2019-09-25 PROCEDURE — 97110 THERAPEUTIC EXERCISES: CPT

## 2019-09-25 PROCEDURE — 97116 GAIT TRAINING THERAPY: CPT

## 2019-09-25 PROCEDURE — 97530 THERAPEUTIC ACTIVITIES: CPT

## 2019-09-25 PROCEDURE — 82948 REAGENT STRIP/BLOOD GLUCOSE: CPT

## 2019-09-25 RX ADMIN — INSULIN GLARGINE 52 UNITS: 100 INJECTION, SOLUTION SUBCUTANEOUS at 08:46

## 2019-09-25 RX ADMIN — TUBERCULIN PURIFIED PROTEIN DERIVATIVE 5 UNITS: 5 INJECTION INTRADERMAL at 06:45

## 2019-09-25 RX ADMIN — INSULIN GLARGINE 52 UNITS: 100 INJECTION, SOLUTION SUBCUTANEOUS at 21:50

## 2019-09-25 RX ADMIN — BUDESONIDE AND FORMOTEROL FUMARATE DIHYDRATE 2 PUFF: 80; 4.5 AEROSOL RESPIRATORY (INHALATION) at 08:46

## 2019-09-25 RX ADMIN — ENOXAPARIN SODIUM 40 MG: 40 INJECTION SUBCUTANEOUS at 08:57

## 2019-09-25 RX ADMIN — GABAPENTIN 100 MG: 100 CAPSULE ORAL at 08:56

## 2019-09-25 RX ADMIN — GABAPENTIN 100 MG: 100 CAPSULE ORAL at 18:05

## 2019-09-25 RX ADMIN — LOSARTAN POTASSIUM 100 MG: 50 TABLET, FILM COATED ORAL at 08:57

## 2019-09-25 RX ADMIN — DILTIAZEM HYDROCHLORIDE 420 MG: 180 CAPSULE, COATED, EXTENDED RELEASE ORAL at 08:56

## 2019-09-25 RX ADMIN — INSULIN LISPRO 6 UNITS: 100 INJECTION, SOLUTION INTRAVENOUS; SUBCUTANEOUS at 18:06

## 2019-09-25 RX ADMIN — INSULIN LISPRO 18 UNITS: 100 INJECTION, SOLUTION INTRAVENOUS; SUBCUTANEOUS at 08:46

## 2019-09-25 RX ADMIN — ASPIRIN 81 MG 81 MG: 81 TABLET ORAL at 08:56

## 2019-09-25 RX ADMIN — BUDESONIDE AND FORMOTEROL FUMARATE DIHYDRATE 2 PUFF: 80; 4.5 AEROSOL RESPIRATORY (INHALATION) at 21:50

## 2019-09-25 RX ADMIN — INSULIN LISPRO 18 UNITS: 100 INJECTION, SOLUTION INTRAVENOUS; SUBCUTANEOUS at 18:06

## 2019-09-25 NOTE — OCCUPATIONAL THERAPY NOTE
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, with long-term current use of insulin ([facility] [address] )    Essential hypertension    Seizures (HCC)    Exertional dyspnea    Enlarged pulmonary artery (HCC)    Aortic dilatation (HCC)    Anemia    Decreased pulses in feet    Diabetes mellitus type 2 with complications, uncontrolled ([facility] [address] )    Hyperlipidemia    Microalbuminuria    Mild obstructive sleep apnea    Class 3 severe obesity with serious comorbidity and body mass index (BMI) of 45 0 to 49 9 in [facility])    Peripheral neuropathy    Prolonged QT interval    Visual impairment in both eyes    Vitamin D deficiency    Lung nodules    Orthostatic hypotension    Mild intermittent asthma with exacerbation    Type 2 diabetes mellitus with microalbuminuria, with long-term current use of insulin (HCC)    Frequent headaches    Other inflammatory and immune myopathies, not elsewhere classified    Transaminitis    Morbid obesity ([facility] [address] )       Past Medical History:   Diagnosis Date    Anemia     Arthritis     Diabetes mellitus ([facility] [address] )     Dyspnea on exertion     Hyperlipidemia     Hypertension     Legally blind 2010    Mild intermittent asthma with exacerbation 8/4/2018    Miscarriage     x 3    Seizures (HCC)     Sickle cell trait ([facility] [address] )     Sleep apnea        TIME: 0900- 1002 (62 min)  VITALS: 129/66 98% HR 93   PAIN: L hip 0/10 at rest 8/10 reduced to 3/10 with ambulation   COGNITION: WFL; oriented x 4   PRECAUTIONS: Pain     EXPIRATION DATE: 10/4/19  TREATMENT DAY: 2  DISCHARGE RECOMMENDATION: Home OT   ADDITIONAL COMMENTS: 57 [address] Moran today  OT, SWFREDI present with pt only  Discussed current level of function in OT/PT and POC goals  Reports son A with laundry, goal remains    Pt interested in tub bench, commode (pt should qualify for bariatric commode) - reports home setup is appropriate  Will trial equipment in today's session  Pt agrees to current POC, eager to return home  Aiming to go home prior to 10/1 -- educated pt on need for [place] and safety prior to d/c  Pt motivated for therapy  ASSESSMENT:    Pt pleasant and cooperative during session  Session focused on tub transfer, functional mobility, bed mobility, and functional transfers  Pt able to don shoes with increased time in preparation for mobility  Reports ambulating in room without nursing -- educated pt on importance of safety/no independent sign at this time  Pt limited by dec UE strength, dec endurance, ROM, pain, and dynamic standing balance  Pt educated on safe functional transfer techniques, the appropriate use of AE/DME to improve functional performance, and the purpose of self initiated rest breaks/ EC techs  Pt able to initiate need to take standing rest break, demonstrating good safety   Pt would benefit from continued OT sessions to focus on above deficits, ADL/IADL performance, and EC techs to maximize independence  Pt seated in recliner with call bell in reach to conclude session      Goals STG achieved within 1 5 weeks Performance at Initial Evaluation Current Performance (last date completed)   Grooming standing  (I) w/ F+ balance DS w/ F balance DS w/ F balance- 9/24   UB ADL (I) (S) (S)- 9/24   LB ADL using AE PRN MI (S) (S)- 9/24  (9/25) shoes only, S inc time    Toileting including hygiene & clothing management  (I) (S) DS- 9/24   Bed mobility w/ HOB flat and no SR to prep for purposeful tasks  MI NA (9/25) S VC's sup to sit    ADL Txfs  MI/I  (9/25) (S) (9/25) - MI from bed and chair   Dynamic and unsupported stand balance for clothing management  F+ F (9/25) F+<>F with fatigue   Tub shower txf using most appropriate method; educate on AE MI NA (9/25) S initiated tub bench - reports tub is <16 "    Kitchen mobility and meal prep  MI NA NA   Household management (laundry/cleaning/bed making) MI NA NA   Activity tolerance for ADLs F+ F- (9/25) F able to initiate needed breaks   UE strength 4-/5 3+/5 3+/5- 9/24         Brandy Sandhoff, MS, OTR/L

## 2019-09-25 NOTE — SOCIAL WORK
[facility] held with patient only  Patient's friend could not make it and patient would like to have meeting without him  OT, MITZI and FREDI present, baseline care plan reviewed and patient is agreeable to plan  Patient provided with a current medication list  Patient has made very good progress in therapy, goals remain to achieve independent, continue to work on steps and ambulate 150 ft  Patient confirms she owns a RW and SPC at home  She will most likely want a tub transfer bench and wide BSC  SW to follow-up to order  Patient's goal is to discharge home before her birthday ([birth year] 1[birth year])  Patient is the most concerned about disability paperwork for her job  SW will discuss with attending again and make sure this is faxed  DON reviewed medical questions  No further questions/cocnerns at this time  SW will continue to follow

## 2019-09-25 NOTE — MALNUTRITION/BMI
This medical record reflects one or more clinical indicators suggestive of malnutrition and/or morbid obesity  Malnutrition Findings:              BMI Findings:  BMI Classifications: Morbid Obesity 45-49  9(Offered diet education but PT didn't want, current diet of CCD1 can promote weight loss  )     Body mass index is 45 82 kg/m²  See Nutrition note dated 9/25/19 for additional details  Completed nutrition assessment is viewable in the nutrition documentation

## 2019-09-25 NOTE — NURSING NOTE
Pt gets up from bed and chair without using call bell, pt has been informed to use call bell but ignores it  Pt walks well without assistive devices, though pt have not made her independent, will continue to reinforce the need for call bell usage with pt

## 2019-09-25 NOTE — PLAN OF CARE
Problem: OCCUPATIONAL THERAPY ADULT  Goal: Performs self-care activities at highest level of function for planned discharge setting  See evaluation for individualized goals  Description  Treatment Interventions: ADL retraining, UE strengthening/ROM, Endurance training, Patient/family training, Equipment evaluation/education, Activityengagement          See flowsheet documentation for full assessment, interventions and recommendations  Outcome: Progressing  Note:   Limitation: Decreased ADL status, Decreased UE strength, Decreased endurance, Decreased self-care trans, Decreased high-level ADLs  Prognosis: Good  Assessment: Pt pleasant and cooperative during session  Session focused on tub transfer, functional mobility, bed mobility, and functional transfers  Pt able to don shoes with increased time in preparation for mobility  Reports ambulating in room without nursing -- educated pt on importance of safety/no independent sign at this time  Pt limited by dec UE strength, dec endurance, ROM, pain, and dynamic standing balance  Pt educated on safe functional transfer techniques, the appropriate use of AE/DME to improve functional performance, and the purpose of self initiated rest breaks/ EC techs  Pt able to initiate need to take standing rest break, demonstrating good safety   Pt would benefit from continued OT sessions to focus on above deficits, ADL/IADL performance, and EC techs to maximize independence  Pt seated in recliner with call bell in reach to conclude session       OT Discharge Recommendation: Home OT  OT - OK to Discharge: No

## 2019-09-26 DIAGNOSIS — IMO0002 UNCONTROLLED TYPE 2 DIABETES MELLITUS WITH OPHTHALMIC COMPLICATION, WITH LONG-TERM CURRENT USE OF INSULIN: ICD-10-CM

## 2019-09-26 LAB
DEPRECATED HGB OTHER BLD-IMP: 0 %
GLUCOSE SERPL-MCNC: 218 MG/DL (ref 65–140)
GLUCOSE SERPL-MCNC: 242 MG/DL (ref 65–140)
GLUCOSE SERPL-MCNC: 77 MG/DL (ref 65–140)
GLUCOSE SERPL-MCNC: 88 MG/DL (ref 65–140)
HGB A MFR BLD: 2 % (ref 1.8–3.2)
HGB A MFR BLD: 98 % (ref 96.4–98.8)
HGB C MFR BLD: 0 %
HGB F MFR BLD: 0 % (ref 0–2)
HGB FRACT BLD-IMP: NORMAL
HGB S BLD QL SOLY: NEGATIVE
HGB S MFR BLD: 0 %

## 2019-09-26 PROCEDURE — 97530 THERAPEUTIC ACTIVITIES: CPT

## 2019-09-26 PROCEDURE — 97535 SELF CARE MNGMENT TRAINING: CPT

## 2019-09-26 PROCEDURE — 97110 THERAPEUTIC EXERCISES: CPT

## 2019-09-26 PROCEDURE — 97116 GAIT TRAINING THERAPY: CPT

## 2019-09-26 PROCEDURE — 82948 REAGENT STRIP/BLOOD GLUCOSE: CPT

## 2019-09-26 RX ORDER — INSULIN GLARGINE 100 [IU]/ML
40 INJECTION, SOLUTION SUBCUTANEOUS EVERY 12 HOURS SCHEDULED
Qty: 8 PEN | Refills: 5 | OUTPATIENT
Start: 2019-09-26 | End: 2019-09-30 | Stop reason: HOSPADM

## 2019-09-26 RX ADMIN — INSULIN GLARGINE 52 UNITS: 100 INJECTION, SOLUTION SUBCUTANEOUS at 21:29

## 2019-09-26 RX ADMIN — INSULIN LISPRO 18 UNITS: 100 INJECTION, SOLUTION INTRAVENOUS; SUBCUTANEOUS at 12:30

## 2019-09-26 RX ADMIN — LOSARTAN POTASSIUM 100 MG: 50 TABLET, FILM COATED ORAL at 10:07

## 2019-09-26 RX ADMIN — ASPIRIN 81 MG 81 MG: 81 TABLET ORAL at 10:08

## 2019-09-26 RX ADMIN — ENOXAPARIN SODIUM 40 MG: 40 INJECTION SUBCUTANEOUS at 10:07

## 2019-09-26 RX ADMIN — BUDESONIDE AND FORMOTEROL FUMARATE DIHYDRATE 2 PUFF: 80; 4.5 AEROSOL RESPIRATORY (INHALATION) at 09:45

## 2019-09-26 RX ADMIN — BUDESONIDE AND FORMOTEROL FUMARATE DIHYDRATE 2 PUFF: 80; 4.5 AEROSOL RESPIRATORY (INHALATION) at 21:29

## 2019-09-26 RX ADMIN — DILTIAZEM HYDROCHLORIDE 420 MG: 180 CAPSULE, COATED, EXTENDED RELEASE ORAL at 10:07

## 2019-09-26 RX ADMIN — ATORVASTATIN CALCIUM 40 MG: 40 TABLET, FILM COATED ORAL at 10:08

## 2019-09-26 RX ADMIN — GABAPENTIN 100 MG: 100 CAPSULE ORAL at 17:59

## 2019-09-26 RX ADMIN — INSULIN LISPRO 4 UNITS: 100 INJECTION, SOLUTION INTRAVENOUS; SUBCUTANEOUS at 12:28

## 2019-09-26 RX ADMIN — INSULIN GLARGINE 52 UNITS: 100 INJECTION, SOLUTION SUBCUTANEOUS at 10:09

## 2019-09-26 RX ADMIN — GABAPENTIN 100 MG: 100 CAPSULE ORAL at 09:43

## 2019-09-26 NOTE — PLAN OF CARE
Problem: PHYSICAL THERAPY ADULT  Goal: Performs mobility at highest level of function for planned discharge setting  See evaluation for individualized goals  Description  Treatment/Interventions: Functional transfer training, LE strengthening/ROM, Elevations, Therapeutic exercise, Endurance training, Patient/family training, Equipment eval/education, Gait training, Spoke to nursing  Equipment Recommended: Other (Comment)(no needs)       See flowsheet documentation for full assessment, interventions and recommendations  Outcome: Progressing  Note:   Prognosis: Good  Problem List: Decreased strength, Impaired balance, Decreased mobility, Decreased coordination, Pain, Obesity, Decreased endurance  Assessment: Pt  able to tolerate all activities durin session  needed seated rest between activities  Performed standing alt  marches, ham curls, HR/TR, mini squats with no UE and with EC for squats  Improved gait later in the session  Nu step for 10 min on level 1 with BUEs and LEs  Pt  reported L groin pain and ambulating with RW took the edge off  Last amb without AD with head turns performed  Pt  progressing well with mobility  Barriers to Discharge: Inaccessible home environment, Decreased caregiver support                See flowsheet documentation for full assessment

## 2019-09-26 NOTE — PLAN OF CARE
Problem: PHYSICAL THERAPY ADULT  Goal: Performs mobility at highest level of function for planned discharge setting  See evaluation for individualized goals  Description  Treatment/Interventions: Functional transfer training, LE strengthening/ROM, Elevations, Therapeutic exercise, Endurance training, Patient/family training, Equipment eval/education, Gait training, Spoke to nursing  Equipment Recommended: Other (Comment)(no needs)       See flowsheet documentation for full assessment, interventions and recommendations  Outcome: Progressing  Note:   Prognosis: Good  Problem List: Decreased strength, Impaired balance, Decreased mobility, Decreased coordination, Pain, Obesity, Decreased endurance  Assessment: Pt seen at bedside for PT treatment session  Pts gait is fairly steady  No LOB  Pt did well on stairs, but declined to perform full flight of stairs  Pt did well w/ standing exercises  Pt was issued an Independent sign for amb w/ RW in hallway  Barriers to Discharge: Inaccessible home environment, Decreased caregiver support                See flowsheet documentation for full assessment

## 2019-09-26 NOTE — PHYSICAL THERAPY NOTE
51 minute treatment       09/26/19 9175   Pain Assessment   Pain Assessment 0-10   Pain Score 1   Pain Type Acute pain   Pain Location Hip   Pain Orientation Left   Pain Descriptors Aching   Restrictions/Precautions   Weight Bearing Precautions Per Order No   Other Precautions Pain; Fall Risk   General   Chart Reviewed Yes   Response to Previous Treatment Patient with no complaints from previous session  Family/Caregiver Present No   Cognition   Overall Cognitive Status WFL   Following Commands Follows all commands and directions without difficulty   Subjective   Subjective Pt reports she just walked in hallway with nursing and RW support   Transfers   Sit to Stand 6  Modified independent   Stand to Sit 6  Modified independent   Stand pivot 6  Modified independent   Additional items   (no AD)   Ambulation/Elevation   Gait pattern Decreased foot clearance; Excessively slow; Short stride   Gait Assistance 6  Modified independent   Assistive Device None   Distance 160' x 2   Stair Management Assistance 5  Supervision   Stair Management Technique One rail R;Step to pattern   Number of Stairs 12   Balance   Ambulatory Fair   Activity Tolerance   Activity Tolerance Patient tolerated treatment well   Exercises   Hip Abduction Standing;20 reps;AROM; Bilateral   Hip Extension Standing;20 reps;Bilateral;AROM   Ankle Pumps Standing;20 reps;Bilateral   Squat Standing;20 reps;Bilateral   Marching Standing;20 reps;Bilateral;AROM   Neuro re-ed sci fit stepper x 10 minutes   Assessment   Prognosis Good   Problem List Decreased strength; Impaired balance;Decreased mobility; Decreased coordination;Pain;Obesity; Decreased endurance   Assessment Pt seen at bedside for PT treatment session  Pts gait is fairly steady  No LOB  Pt did well on stairs, but declined to perform full flight of stairs  Pt did well w/ standing exercises  Pt was issued an Independent sign for amb w/ RW in hallway     Goals   Patient Goals to get home   STG Expiration Date 10/01/19   PT Treatment Day 3   Plan   Treatment/Interventions   (Continue per plan of care)   Progress Progressing toward goals   PT Frequency   (6x/week)   David Serna, PTA

## 2019-09-26 NOTE — PLAN OF CARE
Problem: OCCUPATIONAL THERAPY ADULT  Goal: Performs self-care activities at highest level of function for planned discharge setting  See evaluation for individualized goals  Description  Treatment Interventions: ADL retraining, UE strengthening/ROM, Endurance training, Patient/family training, Equipment evaluation/education, Activityengagement          See flowsheet documentation for full assessment, interventions and recommendations  Outcome: Progressing  Note:   Limitation: Decreased ADL status, Decreased UE strength, Decreased endurance, Decreased self-care trans, Decreased high-level ADLs  Prognosis: Good  Assessment: Pt pleasant and cooperative during AM ADL session  Session focused on bathing, dressing, grooming, functional mobility, and meal prep/kitchen mobility  Pt demonstrates improved functional mobility and reports less pain   Pt would benefit from tub bench and bariatric commode for maximized independence at home  Pt performing basic ADLs at Mod I/I level  Pt educated on  pain management techniques, the appropriate use of AE/DME to improve functional performance, and activity modification techniques for energy conservation (rest breaks)  Pt would benefit from continued OT sessions to continue focus on ADL/IADLs and UE strength to improve functional independence  Pt seated in recliner with call bell in reach to conclude session       OT Discharge Recommendation: Home OT  OT - OK to Discharge: No

## 2019-09-26 NOTE — PHYSICAL THERAPY NOTE
Physical Therapy Treatment Note     09/25/19 1300   Pain Assessment   Pain Assessment 0-10   Pain Type Acute pain   Pain Location Hip   Pain Orientation Left   Diversional Activities Television   Restrictions/Precautions   Weight Bearing Precautions Per Order No   Other Precautions Pain; Fall Risk;Visual impairment   General   Chart Reviewed Yes   Family/Caregiver Present No   Cognition   Overall Cognitive Status WFL   Orientation Level Oriented X4   Subjective   Subjective Pt  reported pain in the L hip  Reported "I think I overdid it the other day by walking two rounds and doing steps" Agreeable to PT  Transfers   Sit to Stand 5  Supervision   Additional items Assist x 1; Increased time required;Armrests   Stand to Sit 5  Supervision   Additional items Assist x 1; Increased time required;Verbal cues   Stand pivot 5  Supervision   Additional items   (with RW)   Ambulation/Elevation   Gait pattern Excessively slow;Decreased foot clearance; Short stride  (lateral sways)   Gait Assistance 5  Supervision   Additional items Assist x 1   Assistive Device Rolling walker;None;SPC   Distance 30ft, 156ft with no AD, 160ft with RW   Stair Management Assistance   (cga)   Additional items Assist x 1;Verbal cues   Stair Management Technique One rail R;Step to pattern; Foreward;Nonreciprocal   Number of Stairs 8   Balance   Static Sitting Normal   Ambulatory Fair   Endurance Deficit   Endurance Deficit Yes   Endurance Deficit Description Fatigue   Activity Tolerance   Activity Tolerance Patient tolerated treatment well   Nurse Made Aware Yes   Exercises   Knee AROM Standing;Bilateral;AROM  (x30)   Knee AROM Long Arc Quad Sitting;20 reps;Bilateral;AROM  (with 5 sec hold)   Ankle Pumps Standing;20 reps;Bilateral;AROM   Squat Bilateral;AROM;20 reps;Standing   Marching Standing;Bilateral;AROM;20 reps   Assessment   Prognosis Good   Problem List Decreased strength; Impaired balance;Decreased mobility; Decreased coordination;Pain;Obesity; Decreased endurance   Assessment Pt  able to tolerate all activities durin session  needed seated rest between activities  Performed standing alt  marches, ham curls, HR/TR, mini squats with no UE and with EC for squats  Improved gait later in the session  Nu step for 10 min on level 1 with BUEs and LEs  Pt  reported L groin pain and ambulating with RW took the edge off  Last amb without AD with head turns performed  Pt  progressing well with mobility   Barriers to Discharge Inaccessible home environment;Decreased caregiver support   Goals   Patient Goals None reported   STG Expiration Date 10/01/19   PT Treatment Day 2   Plan   Treatment/Interventions Functional transfer training;LE strengthening/ROM; Elevations; Therapeutic exercise;Patient/family training;Equipment eval/education;Gait training;Spoke to nursing   Progress Progressing toward goals   PT Frequency Other (Comment)  (6x/week)         Bernice Sheppard, PTA

## 2019-09-26 NOTE — OCCUPATIONAL THERAPY NOTE
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, with long-term current use of insulin ([facility] [address]    Essential hypertension    Seizures (HCC)    Exertional dyspnea    Enlarged pulmonary artery (HCC)    Aortic dilatation (HCC)    Anemia    Decreased pulses in feet    Diabetes mellitus type 2 with complications, uncontrolled ([facility] [address] )    Hyperlipidemia    Microalbuminuria    Mild obstructive sleep apnea    Class 3 severe obesity with serious comorbidity and body mass index (BMI) of 45 0 to 49 9 in [facility])    Peripheral neuropathy    Prolonged QT interval    Visual impairment in both eyes    Vitamin D deficiency    Lung nodules    Orthostatic hypotension    Mild intermittent asthma with exacerbation    Type 2 diabetes mellitus with microalbuminuria, with long-term current use of insulin (HCC)    Frequent headaches    Other inflammatory and immune myopathies, not elsewhere classified    Transaminitis    Morbid obesity ([facility] [address] )       Past Medical History:   Diagnosis Date    Anemia     Arthritis     Diabetes mellitus ([facility] [address] )     Dyspnea on exertion     Hyperlipidemia     Hypertension     Legally blind 2010    Mild intermittent asthma with exacerbation 8/4/2018    Miscarriage     x 3    Seizures (HCC)     Sickle cell trait ([facility] [address] )     Sleep apnea        TIME: 0812-0920  VITALS:  99%   PAIN: 0/10 "my hip feels so much better since yesterday"   COGNITION: WFL  PRECAUTIONS: Pain     EXPIRATION DATE: 10/4/19  TREATMENT DAY: 3  DISCHARGE RECOMMENDATION: Home OT  ADDITIONAL COMMENTS: Pt aiming to return home before birthday (10/1) -- on track performing at Mod I/I level -- will discuss Ind in room with PT      "once my body gets warmed up, I'm good to go"     ASSESSMENT:    Pt pleasant and cooperative during AM ADL session   Session focused on bathing, dressing, grooming, functional mobility, and meal prep/kitchen mobility  Pt demonstrates improved functional mobility and reports less pain   Pt would benefit from tub bench and bariatric commode for maximized independence at home  Pt performing basic ADLs at Mod I/I level  Pt educated on  pain management techniques, the appropriate use of AE/DME to improve functional performance, and activity modification techniques for energy conservation (rest breaks)  Pt would benefit from continued OT sessions to continue focus on ADL/IADLs and UE strength to improve functional independence  Pt seated in recliner with call bell in reach to conclude session  Goals STG achieved within 1 5 weeks Performance at Initial Evaluation Current Performance (last date completed)   Grooming standing  (I) w/ F+ balance  (9/26) DS w/ F balance (9/26) Ind at sink    UB ADL (I)  (9/26) (S) (9/26) I with retrieval    LB ADL using AE PRN MI  (9/26) (S) (9/26) Mod I - recommending tub bench   Toileting including hygiene & clothing management  (I)  (9/26) (S) (9/26) Ind - interested in bar   commode    Bed mobility w/ HOB flat and no SR to prep for purposeful tasks  MI  (9/26) NA (9/26) Ind   ADL Txfs  MI/I  (9/25) (9/26) (S) (9/26) MI- inc time   Dynamic and unsupported stand balance for clothing management  F+  (9/26)  F (9/26) F+ -- with LB ADL in stance    Tub shower txf using most appropriate method; educate on AE MI NA (9/25) S initiated tub bench - reports tub is <16 "    Kitchen mobility and meal prep  MI  (9/26) NA (9/26) Ind no AE - stove top- good safety awareness - no limitation from visual deficits noted   Household management (laundry/cleaning/bed making) MI NA NA   Activity tolerance for ADLs F+  (9/26)  F- (9/26) F+   UE strength 4-/5  (9/26) 3+/5 (9/25)- functional use of B/L UE's - dec swelling -- pt reports significantly improvements in hand function        [employer], MS, OTR/L

## 2019-09-27 LAB
GLUCOSE SERPL-MCNC: 144 MG/DL (ref 65–140)
GLUCOSE SERPL-MCNC: 156 MG/DL (ref 65–140)
GLUCOSE SERPL-MCNC: 218 MG/DL (ref 65–140)
GLUCOSE SERPL-MCNC: 92 MG/DL (ref 65–140)

## 2019-09-27 PROCEDURE — 97110 THERAPEUTIC EXERCISES: CPT

## 2019-09-27 PROCEDURE — 82948 REAGENT STRIP/BLOOD GLUCOSE: CPT

## 2019-09-27 PROCEDURE — 97535 SELF CARE MNGMENT TRAINING: CPT

## 2019-09-27 PROCEDURE — 97530 THERAPEUTIC ACTIVITIES: CPT

## 2019-09-27 PROCEDURE — 0HBRXZZ EXCISION OF TOE NAIL, EXTERNAL APPROACH: ICD-10-PCS | Performed by: FAMILY MEDICINE

## 2019-09-27 PROCEDURE — 97116 GAIT TRAINING THERAPY: CPT

## 2019-09-27 RX ADMIN — DILTIAZEM HYDROCHLORIDE 420 MG: 180 CAPSULE, COATED, EXTENDED RELEASE ORAL at 08:26

## 2019-09-27 RX ADMIN — INSULIN GLARGINE 52 UNITS: 100 INJECTION, SOLUTION SUBCUTANEOUS at 22:34

## 2019-09-27 RX ADMIN — GABAPENTIN 100 MG: 100 CAPSULE ORAL at 17:45

## 2019-09-27 RX ADMIN — ENOXAPARIN SODIUM 40 MG: 40 INJECTION SUBCUTANEOUS at 08:28

## 2019-09-27 RX ADMIN — LOSARTAN POTASSIUM 100 MG: 50 TABLET, FILM COATED ORAL at 08:26

## 2019-09-27 RX ADMIN — ATORVASTATIN CALCIUM 40 MG: 40 TABLET, FILM COATED ORAL at 08:26

## 2019-09-27 RX ADMIN — INSULIN LISPRO 2 UNITS: 100 INJECTION, SOLUTION INTRAVENOUS; SUBCUTANEOUS at 08:25

## 2019-09-27 RX ADMIN — INSULIN LISPRO 18 UNITS: 100 INJECTION, SOLUTION INTRAVENOUS; SUBCUTANEOUS at 17:44

## 2019-09-27 RX ADMIN — GABAPENTIN 100 MG: 100 CAPSULE ORAL at 08:26

## 2019-09-27 RX ADMIN — INSULIN LISPRO 18 UNITS: 100 INJECTION, SOLUTION INTRAVENOUS; SUBCUTANEOUS at 08:27

## 2019-09-27 RX ADMIN — BUDESONIDE AND FORMOTEROL FUMARATE DIHYDRATE 2 PUFF: 80; 4.5 AEROSOL RESPIRATORY (INHALATION) at 11:00

## 2019-09-27 RX ADMIN — INSULIN LISPRO 4 UNITS: 100 INJECTION, SOLUTION INTRAVENOUS; SUBCUTANEOUS at 17:43

## 2019-09-27 RX ADMIN — INSULIN GLARGINE 52 UNITS: 100 INJECTION, SOLUTION SUBCUTANEOUS at 08:00

## 2019-09-27 RX ADMIN — BUDESONIDE AND FORMOTEROL FUMARATE DIHYDRATE 2 PUFF: 80; 4.5 AEROSOL RESPIRATORY (INHALATION) at 20:30

## 2019-09-27 RX ADMIN — ASPIRIN 81 MG 81 MG: 81 TABLET ORAL at 08:28

## 2019-09-27 NOTE — OCCUPATIONAL THERAPY NOTE
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, with long-term current use of insulin ([address] )    Essential hypertension    Seizures (HCC)    Exertional dyspnea    Enlarged pulmonary artery (HCC)    Aortic dilatation (HCC)    Anemia    Decreased pulses in feet    Diabetes mellitus type 2 with complications, uncontrolled ([address] )    Hyperlipidemia    Microalbuminuria    Mild obstructive sleep apnea    Class 3 severe obesity with serious comorbidity and body mass index (BMI) of 45 0 to 49 9 in adult [facility])    Peripheral neuropathy    Prolonged QT interval    Visual impairment in both eyes    Vitamin D deficiency    Lung nodules    Orthostatic hypotension    Mild intermittent asthma with exacerbation    Type 2 diabetes mellitus with microalbuminuria, with long-term current use of insulin (HCC)    Frequent headaches    Other inflammatory and immune myopathies, not elsewhere classified    Transaminitis    Morbid obesity ([address] )         Medical History        Past Medical History:   Diagnosis Date    Anemia      Arthritis      Diabetes mellitus ([address] )      Dyspnea on exertion      Hyperlipidemia      Hypertension      Legally blind 2010    Mild intermittent asthma with exacerbation 8/4/2018    Miscarriage       x 3    Seizures (HCC)      Sickle cell trait (HCC)      Sleep apnea              TIME: ([id number]) 72 min  VITALS: 142/79BP, 87HR  PAIN: reports excessive fatigue which she reports is normal for her; tingling in fingers but getting better as she feels this is from holding the phone for a period of time last evening  COGNITION: WFL  PRECAUTIONS: Pain; fatigue with occasional dizziness when feeling fatigued    "my body just feels like a log-- this is ongoing most of my life"     EXPIRATION DATE: 10/4/19  TREATMENT DAY: 4  DISCHARGE RECOMMENDATION: Home OT  ADDITIONAL COMMENTS:         Goals STG achieved within 1 5 weeks Performance at Initial Evaluation Current Performance (last date completed)   Grooming standing  (I) w/ F+ balance  (9/26, 9/27) DS w/ F balance (9/27)   Independent, increased rest needed and self initiated  Fair+ balance   UB ADL (I)  (9/26, 9/27) (S) (9/27)   independent   LB ADL using AE PRN MI  (9/26, 9/27) (S) (9/27)   Mod I   Increased time needed, good self initiated rest breaks    Toileting including hygiene & clothing management  (I)  (9/26, 9/27) (S) (9/27)  independent   Bed mobility w/ HOB flat and no SR to prep for purposeful tasks  MI  (9/26, 9/27) NA (9/27)  Independent-- increased time and effort with fatigue this date   ADL Txfs  MI/I  (9/25) (9/26, 9/27) (S) (9/27)  Independent without AD in room and MI with RW which she uses as needed to conserve some energy and as she reports not feeling 100% (increased fatigue this date)  Patient educated on DME options for transport for days she does not feel 100%  Dynamic and unsupported stand balance for clothing management  F+  (9/26, 9/27)  F (9/27) F+ -- with LB ADL in stance    Tub shower txf using most appropriate method; educate on AE MI  (9/27) NA (9/27)   MI with transfer bench; Patient reports that she can not get prior to d/c on Monday therefore ed provided on [employer] and prime delivery as she indicated having this resource at home  Kitchen mobility and meal prep  MI  (9/26, 9/27 Simulated) NA 9/27  Simulated due to fatigue this date, no issues noted MI  (9/26)   Ind no AE - stove top- good safety awareness - no limitation from visual deficits noted   Household management (laundry/cleaning/bed making) MI  (9/27) NA 9/27  Patient cleaned up ADL items, put laundry away and made bed all with MI    Ed with hand outs on energy conservation and discussion of specific aspects that she can utilize within her routines at home   Activity tolerance for ADLs F+  (9/26)  F- 9/27  Fair  Extended breaks needed for fatigue levels this date  (9/26) F+   UE strength 4-/5  (9/26, 9/27) 3+/5 9/27  Light 1# hand weigh tolerated in bilateral UE in 3 planes for 10-15 reps  Rest as needed  (9/25)- functional use of B/L UE's - dec swelling -- pt reports significantly improvements in hand function      Assessment:  Patient seen this date for OT with focus on goals as set by OTR  Patient plans to d/c home with spouse and son 9/30/19  Patient reports excess fatigue this date  Patient reports this to be her norm and that she has good and bad days even PTA  Patient ed on energy conservation with hand outs and specific techniques discussed  Patient does however self pace and learned to manage her symptoms throughout the years  Ed on DME options for walker should she feel needs at home along with shower bench which she plans to obtain  Patient continues to progress in therapy skill sets however meeting goals at this time  At end of session patient remains in room with all needs within reach       Jean-Paul Gerber  9/27/2019

## 2019-09-27 NOTE — CONSULTS
Consult Routine Foot Care- Podiatry   Meera Hernandez 59 y o  female MRN: [id number]  Unit/Bed#: -02 Encounter: [id number]    Assessment/Plan     Assessment:  1  Onychomycosis x 10  2  Calluses x 2  3  DM PVD     Plan:  - pt eval and managed today  - Hallucal mycotic nails x2 debrided decreasing thickness by 1 mm  Mycotic toe nails 2-5 b/l were trimmed, decreasing length, without incidence utilizing a sharp nail nipper  - Calluses were sharply trimmed with a 15 blade down to normal epithelium    - All questions and concerns addressed  - Podiatry signing off, thank you for the consult  History of Present Illness     HPI:  Meera Hernandez is a 59 y o  female who presents with painful, elongated toenails and calluses  They state that they see no Podiatrist outpatient  They have difficulty applying their socks and shoes due to the elongation of the nails  The pressure within their shoe gear is painful and they have been unable to cut their nails adequately  Inpatient consult to Podiatry  Consult performed by: Denton Mendoza DPM  Consult ordered by: Elan Portillo MD        Review of Systems   Constitutional: Negative  HENT: Negative  Eyes: Negative  Respiratory: Negative  Cardiovascular: Negative  Gastrointestinal: Negative  Musculoskeletal: Negative   Skin: elongated thickened toenails, calluses   Neurological: Negative          Historical Information   Past Medical History:   Diagnosis Date    Anemia     Arthritis     Diabetes mellitus ([address] )     Dyspnea on exertion     Hyperlipidemia     Hypertension     Legally blind 2010    Mild intermittent asthma with exacerbation 8/4/2018    Miscarriage     x 3    Seizures (HCC)     Sickle cell trait ([address] )     Sleep apnea      Past Surgical History:   Procedure Laterality Date    CATARACT EXTRACTION Bilateral     august and september    EYE SURGERY      HYSTERECTOMY      37    OOPHORECTOMY Left     42     Social History   Social History     Substance and Sexual Activity   Alcohol Use Never    Alcohol/week: 0 0 standard drinks    Frequency: Never    Drinks per session: Patient refused    Binge frequency: Never     Social History     Substance and Sexual Activity   Drug Use No     Social History     Tobacco Use   Smoking Status Never Smoker   Smokeless Tobacco Never Used     Family History:   Family History   Problem Relation Age of Onset    Heart attack Mother     Heart disease Mother     Hypertension Mother     No Known Problems Father     Heart disease Sister     No Known Problems Brother     No Known Problems Son     No Known Problems Daughter     Heart attack Maternal Grandmother     Heart disease Maternal Grandmother     Diabetes Other     Heart attack Other     No Known Problems Sister     No Known Problems Sister     No Known Problems Paternal Aunt     No Known Problems Son     Cancer Neg Hx     Stroke Neg Hx        Meds/Allergies   Medications Prior to Admission   Medication    diltiazem (CARDIZEM CD) 240 mg 24 hr capsule    enoxaparin (LOVENOX) 40 mg/0 4 mL    insulin glargine (LANTUS) 100 units/mL subcutaneous injection    albuterol (PROVENTIL HFA) 90 mcg/act inhaler    aspirin 81 MG tablet    atorvastatin (LIPITOR) 40 mg tablet    SARAH MICROLET LANCETS lancets    Blood Glucose Monitoring Suppl (CONTOUR NEXT MONITOR) w/Device KIT    Blood Pressure Monitor KIT    budesonide-formoterol (SYMBICORT) 80-4 5 MCG/ACT inhaler    chlorthalidone 25 mg tablet    Cholecalciferol (VITAMIN D3) 3000 units TABS    CONTOUR NEXT TEST test strip    diltiazem (TIAZAC) 420 MG 24 hr capsule    gabapentin (NEURONTIN) 100 mg capsule    insulin lispro (HUMALOG KWIKPEN) 100 units/mL injection pen    Insulin Pen Needle (BD PEN NEEDLE DERRICK U/F) 32G X 4 MM MISC    losartan (COZAAR) 100 MG tablet    metFORMIN (GLUCOPHAGE) 500 mg tablet     No Known Allergies    Objective   First Vitals:   Blood Pressure: 167/80 (09/23/19 1815)  Pulse: 89 (09/23/19 1815)  Temperature: (!) 97 °F (36 1 °C) (09/23/19 1815)  Temp Source: Temporal (09/23/19 1815)  Respirations: 20 (09/23/19 1815)  Height: 5' 8" (172 7 cm) (09/23/19 1815)  Weight - Scale: (!) 138 kg (304 lb 14 3 oz) (09/23/19 1815)  SpO2: 94 % (09/23/19 1815)    Current Vitals:   Blood Pressure: 135/65 (09/26/19 1530)  Pulse: 86 (09/26/19 1530)  Temperature: 97 7 °F (36 5 °C) (09/26/19 1530)  Temp Source: Temporal (09/26/19 1530)  Respirations: 20 (09/26/19 1530)  Height: 5' 8" (172 7 cm) (09/23/19 1815)  Weight - Scale: (!) 137 kg (301 lb 5 9 oz) (09/25/19 0707)  SpO2: 96 % (09/26/19 1530)    /65 (BP Location: Left arm)   Pulse 86   Temp 97 7 °F (36 5 °C) (Temporal)   Resp 20   Ht 5' 8" (1 727 m)   Wt (!) 137 kg (301 lb 5 9 oz)   SpO2 96%   BMI 45 82 kg/m²     General Appearance:    Alert, cooperative, no distress   Head:    Normocephalic, without obvious abnormality, atraumatic   Eyes:    PERRL, conjunctiva/corneas clear, EOM's intact            Nose:   Moist mucous membranes, no drainage or sinus tenderness   Throat:   No tenderness, no exudates   Neck:   Supple, symmetrical, trachea midline, no JVD   Back:     Symmetric, no CVA tenderness   Lungs:     Clear to auscultation bilaterally, respirations unlabored   Chest wall:    No tenderness or deformity   Heart:    Regular rate and rhythm, S1 and S2 normal, no murmur, rub   or gallop   Abdomen:     Soft, non-tender, bowel sounds active all four quadrants,     no masses, no organomegaly     Extremities:   MMT is 4/5 to all compartments of the LE, +2/4 edema B/L, Digital ROM is intact,    Pulses:   R DP is +1/4, R PT is +1/4, L DP is +1/4, L PT is +1/4, CFT< 3sec to all digits  Thin/shiny skin noted to the B/L LE, pigmentary changes to B/L LE  Nail thickening b/l   Absent digital hair growth b/l   Skin:   Nails are yellow discolored, thickened, elongated, with notable subungual debris and > 2 mm thickness noted to toenails 1-5 B/L  No open Lesions  Calluses located b/l pinch       Neurologic:   CNII-XII intact  Normal strength, sensation and reflexes       Throughout  Gross sensation is intact  Protective sensation is diminished       Lab Results:   Admission on 09/23/2019   Component Date Value    POC Glucose 09/24/2019 181*    POC Glucose 09/24/2019 185*    POC Glucose 09/24/2019 246*    POC Glucose 09/24/2019 191*    POC Glucose 09/25/2019 128     POC Glucose 09/25/2019 132     POC Glucose 09/25/2019 255*    POC Glucose 09/25/2019 140     POC Glucose 09/26/2019 77     POC Glucose 09/26/2019 242*    POC Glucose 09/26/2019 88     POC Glucose 09/26/2019 218*     Imaging: I have personally reviewed pertinent films in PACS  EKG, Pathology, and Other Studies: I have personally reviewed pertinent reports        Code Status: Level 1 - Full Code  Advance Directive and Living Will:      Power of :    POLST:

## 2019-09-27 NOTE — PLAN OF CARE
Problem: PHYSICAL THERAPY ADULT  Goal: Performs mobility at highest level of function for planned discharge setting  See evaluation for individualized goals  Description  Treatment/Interventions: Functional transfer training, LE strengthening/ROM, Elevations, Therapeutic exercise, Endurance training, Patient/family training, Equipment eval/education, Gait training, Spoke to nursing  Equipment Recommended: Other (Comment)(no needs)       See flowsheet documentation for full assessment, interventions and recommendations  Outcome: Progressing  Note:   Prognosis: Good  Problem List: Decreased strength, Impaired balance, Decreased mobility, Decreased coordination, Pain, Obesity, Decreased endurance  Assessment: Pt seen for PT treatment  Pt reports being tired and reports she wanted to get back into bed  Pt agreeable to participate in PT, then get back into bed  Pts gait is steady  No buckling in legs, but pt reported she felt her legs could give out at any time  Pt pleasant and cooperative during treatment  Barriers to Discharge: Inaccessible home environment, Decreased caregiver support                See flowsheet documentation for full assessment

## 2019-09-27 NOTE — PHYSICAL THERAPY NOTE
53 minute treatment       09/27/19 1423   Pain Assessment   Pain Assessment No/denies pain   Pain Score No Pain   Restrictions/Precautions   Weight Bearing Precautions Per Order No   Other Precautions Fall Risk;Pain   General   Chart Reviewed Yes   Response to Previous Treatment Patient with no complaints from previous session  Family/Caregiver Present No   Cognition   Overall Cognitive Status Impaired   Following Commands Follows all commands and directions without difficulty   Transfers   Sit to Stand 6  Modified independent   Additional items Increased time required   Stand to Sit 6  Modified independent   Additional items Increased time required   Stand pivot 6  Modified independent   Additional items   (RW support)   Ambulation/Elevation   Gait pattern Decreased foot clearance   Gait Assistance 6  Modified independent   Assistive Device Rolling walker;None   Distance 160' x 2, 30' x 2   Stair Management Assistance 5  Supervision   Stair Management Technique One rail R   Number of Stairs 12   Balance   Ambulatory Fair   Endurance Deficit   Endurance Deficit Yes   Endurance Deficit Description fatigue   Activity Tolerance   Activity Tolerance Patient tolerated treatment well   Exercises   Knee AROM Long Arc Quad Sitting;20 reps;AROM; Bilateral   Ankle Pumps Standing;20 reps;Bilateral   Squat Standing   Marching Standing;20 reps;Bilateral   Neuro re-ed sci fit stepper x 13 minutes   Assessment   Prognosis Good   Problem List Decreased strength; Impaired balance;Decreased mobility; Decreased coordination;Pain;Obesity; Decreased endurance   Assessment Pt seen for PT treatment  Pt reports being tired and reports she wanted to get back into bed  Pt agreeable to participate in PT, then get back into bed  Pts gait is steady  No buckling in legs, but pt reported she felt her legs could give out at any time  Pt pleasant and cooperative during treatment     Goals   Patient Goals to go home   STG Expiration Date 10/01/19   PT Treatment Day 4   Plan   Treatment/Interventions   (Continue per plan of care)   Progress Progressing toward goals   PT Frequency   (6x/week)   Delfina Lin, PTA

## 2019-09-27 NOTE — PLAN OF CARE
Problem: OCCUPATIONAL THERAPY ADULT  Goal: Performs self-care activities at highest level of function for planned discharge setting  See evaluation for individualized goals  Description  Treatment Interventions: ADL retraining, UE strengthening/ROM, Endurance training, Patient/family training, Equipment evaluation/education, Activityengagement          See flowsheet documentation for full assessment, interventions and recommendations  Outcome: Progressing  Note:   Limitation: Decreased ADL status, Decreased UE strength, Decreased endurance, Decreased self-care trans, Decreased high-level ADLs  Prognosis: Good  Assessment:  Patient seen this date for OT with focus on goals as set by OTR  Patient plans to d/c home with spouse and son 9/30/19  Patient reports excess fatigue this date  Patient reports this to be her norm and that she has good and bad days even PTA  Patient ed on energy conservation with hand outs and specific techniques discussed  Patient does however self pace and learned to manage her symptoms throughout the years  Ed on DME options for walker should she feel needs at home along with shower bench which she plans to obtain  Patient continues to progress in therapy skill sets however meeting goals at this time  At end of session patient remains in room with all needs within reach     OT Discharge Recommendation: Home OT  OT - OK to Discharge: No

## 2019-09-28 LAB
GLUCOSE SERPL-MCNC: 103 MG/DL (ref 65–140)
GLUCOSE SERPL-MCNC: 133 MG/DL (ref 65–140)
GLUCOSE SERPL-MCNC: 199 MG/DL (ref 65–140)
GLUCOSE SERPL-MCNC: 221 MG/DL (ref 65–140)

## 2019-09-28 PROCEDURE — 82948 REAGENT STRIP/BLOOD GLUCOSE: CPT

## 2019-09-28 RX ADMIN — GABAPENTIN 100 MG: 100 CAPSULE ORAL at 17:08

## 2019-09-28 RX ADMIN — BUDESONIDE AND FORMOTEROL FUMARATE DIHYDRATE 2 PUFF: 80; 4.5 AEROSOL RESPIRATORY (INHALATION) at 22:24

## 2019-09-28 RX ADMIN — INSULIN LISPRO 4 UNITS: 100 INJECTION, SOLUTION INTRAVENOUS; SUBCUTANEOUS at 12:28

## 2019-09-28 RX ADMIN — INSULIN LISPRO 18 UNITS: 100 INJECTION, SOLUTION INTRAVENOUS; SUBCUTANEOUS at 12:24

## 2019-09-28 RX ADMIN — BUDESONIDE AND FORMOTEROL FUMARATE DIHYDRATE 2 PUFF: 80; 4.5 AEROSOL RESPIRATORY (INHALATION) at 08:37

## 2019-09-28 RX ADMIN — INSULIN GLARGINE 52 UNITS: 100 INJECTION, SOLUTION SUBCUTANEOUS at 22:00

## 2019-09-28 RX ADMIN — ATORVASTATIN CALCIUM 40 MG: 40 TABLET, FILM COATED ORAL at 08:39

## 2019-09-28 RX ADMIN — ENOXAPARIN SODIUM 40 MG: 40 INJECTION SUBCUTANEOUS at 08:42

## 2019-09-28 RX ADMIN — INSULIN LISPRO 18 UNITS: 100 INJECTION, SOLUTION INTRAVENOUS; SUBCUTANEOUS at 17:09

## 2019-09-28 RX ADMIN — ASPIRIN 81 MG 81 MG: 81 TABLET ORAL at 08:39

## 2019-09-28 RX ADMIN — INSULIN GLARGINE 52 UNITS: 100 INJECTION, SOLUTION SUBCUTANEOUS at 08:40

## 2019-09-28 RX ADMIN — GABAPENTIN 100 MG: 100 CAPSULE ORAL at 08:39

## 2019-09-28 RX ADMIN — DILTIAZEM HYDROCHLORIDE 420 MG: 180 CAPSULE, COATED, EXTENDED RELEASE ORAL at 08:38

## 2019-09-28 RX ADMIN — LOSARTAN POTASSIUM 100 MG: 50 TABLET, FILM COATED ORAL at 08:39

## 2019-09-28 NOTE — NURSING NOTE
Am blood sugar 103 with eb to hold novolog insulin < 160 patient stated it would go up and I explained yes but eb say to hold it and you're getting lantus insulin which was given when she ate  Lunch sugar 221 " I told you it would go up leave me alone"  Dr Rosa Gamboa notified and new orders noted

## 2019-09-29 LAB
GLUCOSE SERPL-MCNC: 104 MG/DL (ref 65–140)
GLUCOSE SERPL-MCNC: 114 MG/DL (ref 65–140)
GLUCOSE SERPL-MCNC: 115 MG/DL (ref 65–140)
GLUCOSE SERPL-MCNC: 128 MG/DL (ref 65–140)

## 2019-09-29 PROCEDURE — 97530 THERAPEUTIC ACTIVITIES: CPT

## 2019-09-29 PROCEDURE — 82948 REAGENT STRIP/BLOOD GLUCOSE: CPT

## 2019-09-29 RX ORDER — INSULIN GLARGINE 100 [IU]/ML
52 INJECTION, SOLUTION SUBCUTANEOUS EVERY 12 HOURS SCHEDULED
Qty: 5 PEN | Refills: 0 | Status: SHIPPED | OUTPATIENT
Start: 2019-09-29 | End: 2019-10-08 | Stop reason: SDUPTHER

## 2019-09-29 RX ADMIN — GABAPENTIN 100 MG: 100 CAPSULE ORAL at 17:00

## 2019-09-29 RX ADMIN — GABAPENTIN 100 MG: 100 CAPSULE ORAL at 08:02

## 2019-09-29 RX ADMIN — BUDESONIDE AND FORMOTEROL FUMARATE DIHYDRATE 2 PUFF: 80; 4.5 AEROSOL RESPIRATORY (INHALATION) at 07:47

## 2019-09-29 RX ADMIN — INSULIN GLARGINE 52 UNITS: 100 INJECTION, SOLUTION SUBCUTANEOUS at 08:00

## 2019-09-29 RX ADMIN — ATORVASTATIN CALCIUM 40 MG: 40 TABLET, FILM COATED ORAL at 08:02

## 2019-09-29 RX ADMIN — LOSARTAN POTASSIUM 100 MG: 50 TABLET, FILM COATED ORAL at 08:02

## 2019-09-29 RX ADMIN — INSULIN GLARGINE 52 UNITS: 100 INJECTION, SOLUTION SUBCUTANEOUS at 21:20

## 2019-09-29 RX ADMIN — DILTIAZEM HYDROCHLORIDE 420 MG: 180 CAPSULE, COATED, EXTENDED RELEASE ORAL at 08:02

## 2019-09-29 RX ADMIN — ASPIRIN 81 MG 81 MG: 81 TABLET ORAL at 08:02

## 2019-09-29 RX ADMIN — BUDESONIDE AND FORMOTEROL FUMARATE DIHYDRATE 2 PUFF: 80; 4.5 AEROSOL RESPIRATORY (INHALATION) at 21:14

## 2019-09-29 RX ADMIN — INSULIN LISPRO 18 UNITS: 100 INJECTION, SOLUTION INTRAVENOUS; SUBCUTANEOUS at 16:59

## 2019-09-29 RX ADMIN — INSULIN LISPRO 18 UNITS: 100 INJECTION, SOLUTION INTRAVENOUS; SUBCUTANEOUS at 07:41

## 2019-09-29 RX ADMIN — INSULIN LISPRO 18 UNITS: 100 INJECTION, SOLUTION INTRAVENOUS; SUBCUTANEOUS at 12:18

## 2019-09-29 NOTE — ASSESSMENT & PLAN NOTE
Patient was recently evaluated for bilateral upper extremity swelling tenderness and weakness  Rheumatological workup was ordered  Will check a ESR and a CRP tomorrow prior to discharge  Outpatient follow-up with Rheumatology being arranged as well  No interventions at this time other than physical therapy and occupational therapy  Patient has a repeat flare and worsening of her inflammatory markers may be a candidate for short course of steroids    Is unclear whether she had polymyalgia rheumatica flare versus myositis versus other rheumatological etiology    Continue Tylenol for pain

## 2019-09-29 NOTE — ASSESSMENT & PLAN NOTE
Lab Results   Component Value Date    HGBA1C 14 6 (H) 08/22/2019       Recent Labs     09/28/19  1624 09/28/19  2031 09/29/19  0609 09/29/19  1212   POCGLU 133 199* 128 115       Blood Sugar Average: Last 72 hrs:  (P) 152 [id number] improving control  Continue Lantus and insulin sliding scale with NovoLog standing dose    Also continue Neurontin for neuropathic pain

## 2019-09-29 NOTE — OCCUPATIONAL THERAPY NOTE
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, with long-term current use of insulin ([facility] )    Essential hypertension    Hyperlipidemia    Class 3 severe obesity with serious comorbidity and body mass index (BMI) of 45 0 to 49 9 in adult ([facility] )    Mild intermittent asthma with exacerbation    Frequent headaches              TIME: 10:35-11:15 (40 minutes)  PAIN: Pt reports increased fatigue today, feeling "low"  COGNITION: WFL  PRECAUTIONS: NA, Pt with independent sign      EXPIRATION DATE: 10/4/19  TREATMENT DAY: 5  DISCHARGE RECOMMENDATION: Home OT  ADDITIONAL COMMENTS:           Goals STG achieved within 1 5 weeks Performance at Initial Evaluation Current Performance (last date completed)   Grooming standing  (I) w/ F+ balance  (9/26, 9/27, 9/29))  MET DS w/ F balance (9/27)   Independent, increased rest needed and self initiated  Fair+ balance   UB ADL (I)  (9/26, 9/27)  MET (S) (9/27)   independent   LB ADL using AE PRN MI  (9/26, 9/27)  MET (S) (9/27)   Mod I   Increased time needed, good self initiated rest breaks    Toileting including hygiene & clothing management  (I)  (9/26, 9/27)  MET (S) (9/27)  independent   Bed mobility w/ HOB flat and no SR to prep for purposeful tasks  MI  (9/26, 9/27, 9/29) NA (9/27)  Independent-- increased time and effort with fatigue this date   ADL Txfs  MI/I  (9/25) (9/26, 9/27)  MET (S) (9/27)  Independent without AD in room and MI with RW which she uses as needed to conserve some energy and as she reports not feeling 100% (increased fatigue this date)  Patient educated on DME options for transport for days she does not feel 100%       Dynamic and unsupported stand balance for clothing management  F+  (9/26, 9/27)   MET F (9/27) F+ -- with LB ADL in stance    Tub shower txf using most appropriate method; educate on AE MI  (9/27, 9/29)  MET NA (9/27)   MI with transfer bench; Patient reports that she can not get prior to d/c on Monday therefore ed provided on [address] and prime delivery as she indicated having this resource at home  Kitchen mobility and meal prep  MI  (9/26, 9/27 Simulated)  MET NA 9/27  Simulated due to fatigue this date, no issues noted MI  (9/26)   Ind no AE - stove top- good safety awareness - no limitation from visual deficits noted   Household management (laundry/cleaning/bed making) MI  (9/27)  MET NA 9/27  Patient cleaned up ADL items, put laundry away and made bed all with MI  Ed with hand outs on energy conservation and discussion of specific aspects that she can utilize within her routines at home   Activity tolerance for ADLs F+  (9/26, 9/29)   MET F- 9/27  Fair  Extended breaks needed for fatigue levels this date  (9/26) F+   UE strength 4-/5  (9/26, 9/27)  MET 3+/5 9/27  Light 1# hand weigh tolerated in bilateral UE in 3 planes for 10-15 reps  Rest as needed  (9/25)- functional use of B/L UE's - dec swelling -- pt reports significantly improvements in hand function       Assessment & Discharge Summary :  Patient seen this date for OT to review plan of care  Pt completed tub transfer Radha with bench and demonstrated good activity tolerance  Reviewed with Pt UE thera-ex and given moderate resistance foam ball  Pt reports feeling much better and back to her baseline, continues to be limited at times by decreased endurance and tolerance  Pt provided with education re: energy conservation techniques  Pt has made excellent progress and demonstrated consistency with goal achievement (see chart above for details), completed 5/5 OT treatment sessions  Overall, Pt has no further acute OT needs, will discharge services at this time    Pt set to discharge home tomorrow (9/30) with family support and a tub transfer bench

## 2019-09-29 NOTE — PLAN OF CARE
Problem: OCCUPATIONAL THERAPY ADULT  Goal: Performs self-care activities at highest level of function for planned discharge setting  See evaluation for individualized goals  Description  Treatment Interventions: ADL retraining, UE strengthening/ROM, Endurance training, Patient/family training, Equipment evaluation/education, Activityengagement          See flowsheet documentation for full assessment, interventions and recommendations  Outcome: Completed  Note:   Limitation: Decreased ADL status, Decreased UE strength, Decreased endurance, Decreased self-care trans, Decreased high-level ADLs  Prognosis: Good  Assessment: Patient seen this date for OT to review plan of care  Pt completed tub transfer Radha with bench and demonstrated good activity tolerance  Reviewed with Pt UE thera-ex and given moderate resistance foam ball  Pt reports feeling much better and back to her baseline, continues to be limited at times by decreased endurance and tolerance  Pt provided with education re: energy conservation techniques  Pt has made excellent progress and demonstrated consistency with goal achievement (see chart above for details), completed 5/5 OT treatment sessions  Overall, Pt has no further acute OT needs, will discharge services at this time  Pt set to discharge home tomorrow (9/30) with family support and a tub transfer bench            OT Discharge Recommendation: Home OT  OT - OK to Discharge: No

## 2019-09-30 VITALS
OXYGEN SATURATION: 95 % | RESPIRATION RATE: 18 BRPM | TEMPERATURE: 97.6 F | HEART RATE: 80 BPM | BODY MASS INDEX: 44.41 KG/M2 | WEIGHT: 293 LBS | DIASTOLIC BLOOD PRESSURE: 80 MMHG | SYSTOLIC BLOOD PRESSURE: 131 MMHG | HEIGHT: 68 IN

## 2019-09-30 LAB
CRP SERPL QL: 23.2 MG/L
ERYTHROCYTE [SEDIMENTATION RATE] IN BLOOD: 121 MM/HOUR (ref 1–20)
GLUCOSE SERPL-MCNC: 112 MG/DL (ref 65–140)
GLUCOSE SERPL-MCNC: 98 MG/DL (ref 65–140)

## 2019-09-30 PROCEDURE — G8978 MOBILITY CURRENT STATUS: HCPCS

## 2019-09-30 PROCEDURE — 82948 REAGENT STRIP/BLOOD GLUCOSE: CPT

## 2019-09-30 PROCEDURE — 97112 NEUROMUSCULAR REEDUCATION: CPT

## 2019-09-30 PROCEDURE — 99316 NF DSCHRG MGMT 30 MIN+: CPT | Performed by: FAMILY MEDICINE

## 2019-09-30 PROCEDURE — G8980 MOBILITY D/C STATUS: HCPCS

## 2019-09-30 PROCEDURE — 97530 THERAPEUTIC ACTIVITIES: CPT

## 2019-09-30 PROCEDURE — G8979 MOBILITY GOAL STATUS: HCPCS

## 2019-09-30 PROCEDURE — 86140 C-REACTIVE PROTEIN: CPT | Performed by: FAMILY MEDICINE

## 2019-09-30 PROCEDURE — 90682 RIV4 VACC RECOMBINANT DNA IM: CPT | Performed by: FAMILY MEDICINE

## 2019-09-30 PROCEDURE — 85652 RBC SED RATE AUTOMATED: CPT | Performed by: FAMILY MEDICINE

## 2019-09-30 PROCEDURE — 97110 THERAPEUTIC EXERCISES: CPT

## 2019-09-30 RX ADMIN — INSULIN LISPRO 18 UNITS: 100 INJECTION, SOLUTION INTRAVENOUS; SUBCUTANEOUS at 07:47

## 2019-09-30 RX ADMIN — BUDESONIDE AND FORMOTEROL FUMARATE DIHYDRATE 2 PUFF: 80; 4.5 AEROSOL RESPIRATORY (INHALATION) at 09:11

## 2019-09-30 RX ADMIN — INSULIN GLARGINE 30 UNITS: 100 INJECTION, SOLUTION SUBCUTANEOUS at 09:15

## 2019-09-30 RX ADMIN — INFLUENZA A VIRUS A/BRISBANE/02/2018 (H1N1) RECOMBINANT HEMAGGLUTININ ANTIGEN, INFLUENZA A VIRUS A/KANSAS/14/2017 (H3N2) RECOMBINANT HEMAGGLUTININ ANTIGEN, INFLUENZA B VIRUS B/PHUKET/3073/2013 RECOMBINANT HEMAGGLUTININ ANTIGEN, AND INFLUENZA B VIRUS B/MARYLAND/15/2016 RECOMBINANT HEMAGGLUTININ ANTIGEN 0.5 ML: 45; 45; 45; 45 INJECTION INTRAMUSCULAR at 11:22

## 2019-09-30 RX ADMIN — GABAPENTIN 100 MG: 100 CAPSULE ORAL at 09:10

## 2019-09-30 RX ADMIN — LOSARTAN POTASSIUM 100 MG: 50 TABLET, FILM COATED ORAL at 09:11

## 2019-09-30 RX ADMIN — INSULIN LISPRO 10 UNITS: 100 INJECTION, SOLUTION INTRAVENOUS; SUBCUTANEOUS at 12:15

## 2019-09-30 RX ADMIN — ASPIRIN 81 MG 81 MG: 81 TABLET ORAL at 09:11

## 2019-09-30 RX ADMIN — DILTIAZEM HYDROCHLORIDE 420 MG: 180 CAPSULE, COATED, EXTENDED RELEASE ORAL at 09:10

## 2019-09-30 RX ADMIN — ATORVASTATIN CALCIUM 40 MG: 40 TABLET, FILM COATED ORAL at 09:10

## 2019-09-30 NOTE — SOCIAL WORK
Disability paperwork faxed on Friday  Originals given back to patient  Patient plans to discharge home today  She plans to order an [employer] to accomodate equipment (heavy duty tub bench and wide BSC)  MITZI sent order to [address] [place] [address] to deliver today  [address] discussed with pt, copy faxed to insurance  Copy placed in scan bin  Transfer letter prepared in patient's discharge folder, copy placed in scan bin  Patient is now requested to use VNA vs outpatient  MITZI discussed patient's preferences and selection for VNA  Patient has never used a home health agency in the past, prefers to use [facility]  MITZI sent referral and notified attending to update home health order

## 2019-09-30 NOTE — UTILIZATION REVIEW
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, with long-term current use of insulin ([facility] )    Essential hypertension    Hyperlipidemia    Class 3 severe obesity with serious comorbidity and body mass index (BMI) of 45 0 to 49 9 in adult ([facility] )    Mild intermittent asthma with exacerbation    Frequent headaches                 TIME: 10:35-11:15 (40 minutes)  PAIN: Pt reports increased fatigue today, feeling "low"  COGNITION: WFL  PRECAUTIONS: NA, Pt with independent sign      EXPIRATION DATE: 10/4/19  TREATMENT DAY: 5  DISCHARGE RECOMMENDATION: Home OT  ADDITIONAL COMMENTS:           Goals STG achieved within 1 5 weeks Performance at Initial Evaluation Current Performance (last date completed)   Grooming standing  (I) w/ F+ balance  (9/26, 9/27, 9/29))  MET DS w/ F balance (9/27)   Independent, increased rest needed and self initiated   Fair+ balance   UB ADL (I)  (9/26, 9/27)  MET (S) (9/27)   independent   LB ADL using AE PRN MI  (9/26, 9/27)  MET (S) (9/27)   Mod I   Increased time needed, good self initiated rest breaks    Toileting including hygiene & clothing management  (I)  (9/26, 9/27)  MET (S) (9/27)  independent   Bed mobility w/ HOB flat and no SR to prep for purposeful tasks  MI  (9/26, 9/27, 9/29) NA (9/27)  Independent-- increased time and effort with fatigue this date   ADL Txfs  MI/I  (9/25) (9/26, 9/27)  MET (S) (9/27)  Independent without AD in room and MI with RW which she uses as needed to conserve some energy and as she reports not feeling 100% (increased fatigue this date)  Patient educated on DME options for transport for days she does not feel 100%       Dynamic and unsupported stand balance for clothing management  F+  (9/26, 9/27)   MET F (9/27) F+ -- with LB ADL in stance    Tub shower txf using most appropriate method; educate on AE MI  (9/27, 9/29)  MET NA (9/27)   MI with transfer bench; Patient reports that she can not get prior to d/c on Monday therefore ed provided on [employer] and prime delivery as she indicated having this resource at home  Kitchen mobility and meal prep  MI  (9/26, 9/27 Simulated)  MET NA 9/27  Simulated due to fatigue this date, no issues noted MI  (9/26)   Ind no AE - stove top- good safety awareness - no limitation from visual deficits noted   Household management (laundry/cleaning/bed making) MI  (9/27)  MET NA 9/27  Patient cleaned up ADL items, put laundry away and made bed all with MI   Ed with hand outs on energy conservation and discussion of specific aspects that she can utilize within her routines at home   Activity tolerance for ADLs F+  (9/26, 9/29)   MET F- 9/27  Fair  Extended breaks needed for fatigue levels this date  (9/26) F+   UE strength 4-/5  (9/26, 9/27)  MET 3+/5 9/27  Light 1# hand weigh tolerated in bilateral UE in 3 planes for 10-15 reps   Rest as needed     (9/25)- functional use of B/L UE's - dec swelling -- pt reports significantly improvements in hand function       Assessment & Discharge Summary :  Patient seen this date for OT to review plan of care  Pt completed tub transfer Radha with bench and demonstrated good activity tolerance  Reviewed with Pt UE thera-ex and given moderate resistance foam ball  Pt reports feeling much better and back to her baseline, continues to be limited at times by decreased endurance and tolerance  Pt provided with education re: energy conservation techniques   Pt has made excellent progress and demonstrated consistency with goal achievement (see chart above for details), completed 5/5 OT treatment sessions  Overall, Pt has no further acute OT needs, will discharge services at this time  Pt set to discharge home tomorrow (9/30) with family support and a tub transfer bench  PT NOTE:       09/27/19 1423   Pain Assessment   Pain Assessment No/denies pain   Pain Score No Pain   Restrictions/Precautions   Weight Bearing Precautions Per Order No   Other Precautions Fall Risk;Pain   General   Chart Reviewed Yes   Response to Previous Treatment Patient with no complaints from previous session  Family/Caregiver Present No   Cognition   Overall Cognitive Status Impaired   Following Commands Follows all commands and directions without difficulty   Transfers   Sit to Stand 6  Modified independent   Additional items Increased time required   Stand to Sit 6  Modified independent   Additional items Increased time required   Stand pivot 6  Modified independent   Additional items    (RW support)   Ambulation/Elevation   Gait pattern Decreased foot clearance   Gait Assistance 6  Modified independent   Assistive Device Rolling walker;None   Distance 160' x 2, 30' x 2   Stair Management Assistance 5  Supervision   Stair Management Technique One rail R   Number of Stairs 12   Balance   Ambulatory Fair   Endurance Deficit   Endurance Deficit Yes   Endurance Deficit Description fatigue   Activity Tolerance   Activity Tolerance Patient tolerated treatment well   Exercises   Knee AROM Long Arc Quad Sitting;20 reps;AROM; Bilateral   Ankle Pumps Standing;20 reps;Bilateral   Squat Standing   Marching Standing;20 reps;Bilateral   Neuro re-ed sci fit stepper x 13 minutes   Assessment   Prognosis Good   Problem List Decreased strength; Impaired balance;Decreased mobility; Decreased coordination;Pain;Obesity; Decreased endurance   Assessment Pt seen for PT treatment  Pt reports being tired and reports she wanted to get back into bed  Pt agreeable to participate in PT, then get back into bed    Pts gait is steady  No buckling in legs, but pt reported she felt her legs could give out at any time  Pt pleasant and cooperative during treatment     Goals   Patient Goals to go home   STG Expiration Date 10/01/19   PT Treatment Day 4   Plan   Treatment/Interventions    (Continue per plan of care)   Progress Progressing toward goals   PT Frequency    (6x/week)   Marry Landis, PTA

## 2019-09-30 NOTE — SOCIAL WORK
[facility] delivered [facility], patient wishes to purchase tub transfer bench when she is paid on Thursday  [employer] provided contact information  Patient accepted with EDMUNDO RN/PT/OT  No further questions/concerns at this time

## 2019-09-30 NOTE — PLAN OF CARE
Problem: PAIN - ADULT  Goal: Verbalizes/displays adequate comfort level or baseline comfort level  Description  Interventions:  - Encourage patient to monitor pain and request assistance  - Assess pain using appropriate pain scale  - Administer analgesics based on type and severity of pain and evaluate response  - Implement non-pharmacological measures as appropriate and evaluate response  - Consider cultural and social influences on pain and pain management  - Notify physician/advanced practitioner if interventions unsuccessful or patient reports new pain  Outcome: Adequate for Discharge     Problem: SAFETY ADULT  Goal: Patient will remain free of falls  Description  INTERVENTIONS:  - Assess patient frequently for physical needs  -  Identify cognitive and physical deficits and behaviors that affect risk of falls    -  [place] fall precautions as indicated by assessment   - Educate patient/family on patient safety including physical limitations  - Instruct patient to call for assistance with activity based on assessment  - Modify environment to reduce risk of injury  - Consider OT/PT consult to assist with strengthening/mobility  Outcome: Adequate for Discharge  Goal: Maintain or return to baseline ADL function  Description  INTERVENTIONS:  -  Assess patient's ability to carry out ADLs; assess patient's baseline for ADL function and identify physical deficits which impact ability to perform ADLs (bathing, care of mouth/teeth, toileting, grooming, dressing, etc )  - Assess/evaluate cause of self-care deficits   - Assess range of motion  - Assess patient's mobility; develop plan if impaired  - Assess patient's need for assistive devices and provide as appropriate  - Encourage maximum independence but intervene and supervise when necessary  - Involve family in performance of ADLs  - Assess for home care needs following discharge   - Consider OT consult to assist with ADL evaluation and planning for discharge  - Provide patient education as appropriate  Outcome: Adequate for Discharge  Goal: Maintain or return mobility status to optimal level  Description  INTERVENTIONS:  - Assess patient's baseline mobility status (ambulation, transfers, stairs, etc )    - Identify cognitive and physical deficits and behaviors that affect mobility  - Identify mobility aids required to assist with transfers and/or ambulation (gait belt, sit-to-stand, lift, walker, cane, etc )  - [place] fall precautions as indicated by assessment  - Record patient progress and toleration of activity level on Mobility SBAR; progress patient to next Phase/Stage  - Instruct patient to call for assistance with activity based on assessment  - Consider rehabilitation consult to assist with strengthening/weightbearing, etc   Outcome: Adequate for Discharge     Problem: DISCHARGE PLANNING  Goal: Discharge to home or other facility with appropriate resources  Description  INTERVENTIONS:  - Identify barriers to discharge w/patient and caregiver  - Arrange for needed discharge resources and transportation as appropriate  - Identify discharge learning needs (meds, wound care, etc )  - Arrange for interpretive services to assist at discharge as needed  - Refer to Case Management Department for coordinating discharge planning if the patient needs post-hospital services based on physician/advanced practitioner order or complex needs related to functional status, cognitive ability, or social support system  Outcome: Adequate for Discharge     Problem: Knowledge Deficit  Goal: Patient/family/caregiver demonstrates understanding of disease process, treatment plan, medications, and discharge instructions  Description  Complete learning assessment and assess knowledge base    Interventions:  - Provide teaching at level of understanding  - Provide teaching via preferred learning methods  Outcome: Adequate for Discharge     Problem: Potential for Falls  Goal: Patient will remain free of falls  Description  INTERVENTIONS:  - Assess patient frequently for physical needs  -  Identify cognitive and physical deficits and behaviors that affect risk of falls    -  Nursery fall precautions as indicated by assessment   - Educate patient/family on patient safety including physical limitations  - Instruct patient to call for assistance with activity based on assessment  - Modify environment to reduce risk of injury  - Consider OT/PT consult to assist with strengthening/mobility  Outcome: Adequate for Discharge     Problem: Prexisting or High Potential for Compromised Skin Integrity  Goal: Skin integrity is maintained or improved  Description  INTERVENTIONS:  - Identify patients at risk for skin breakdown  - Assess and monitor skin integrity  - Assess and monitor nutrition and hydration status  - Monitor labs   - Assess for incontinence   - Turn and reposition patient  - Assist with mobility/ambulation  - Relieve pressure over bony prominences  - Avoid friction and shearing  - Provide appropriate hygiene as needed including keeping skin clean and dry  - Evaluate need for skin moisturizer/barrier cream  - Collaborate with interdisciplinary team   - Patient/family teaching  - Consider wound care consult   Outcome: Adequate for Discharge

## 2019-09-30 NOTE — PLAN OF CARE
Problem: PHYSICAL THERAPY ADULT  Goal: Performs mobility at highest level of function for planned discharge setting  See evaluation for individualized goals  Description  Treatment/Interventions: Functional transfer training, LE strengthening/ROM, Elevations, Therapeutic exercise, Endurance training, Patient/family training, Equipment eval/education, Gait training, Spoke to nursing  Equipment Recommended: Other (Comment)(no needs)       See flowsheet documentation for full assessment, interventions and recommendations  Outcome: Adequate for Discharge  Note:   Prognosis: Good  Problem List: Decreased strength, Impaired balance, Decreased mobility, Decreased coordination, Pain, Obesity, Decreased endurance  Assessment: Pt ws seen for 60 minute treatment session which consisted of therapeutic activities, neuromuscular reeducation and therapeutic exercise  Pt gait without lateral trunk lean to the left which was present on evaluation 9/24/19  She currently reports no c/o pain in LE's  She is noted to perform steps with step to sequence and she reports due to leg feeling well and not wanting to aggravate anything  Pt has demonstrated good progress during stay in rehab  She is able to stand with UE support in 1 07 sec which shows good strength in LE's  She appears to be low fall risk obtained by TUGT, 180* turn < 5 steps and walking speed of 1 0 m/sec  Discharge is anticipated for today and patient appears ready for discharge from PT standpoint  Barriers to Discharge: None     Recommendation: Home with family support     PT - OK to Discharge: Yes    See flowsheet documentation for full assessment

## 2019-09-30 NOTE — PHYSICAL THERAPY NOTE
Physical Therapy treatment note and Discharge summary    Time in/out (treatment 905-1004)       09/30/19 1005   Pain Assessment   Pain Assessment No/denies pain   Pain Score No Pain   Restrictions/Precautions   Weight Bearing Precautions Per Order No   General   Chart Reviewed Yes   Response to Previous Treatment Patient with no complaints from previous session  Family/Caregiver Present No   Cognition   Arousal/Participation Cooperative; Alert   Subjective   Subjective No c/o  Transfers   Sit to Stand 7  Independent   Stand to Sit 7  Independent   Stand pivot 7  Independent   Ambulation/Elevation   Gait Assistance 7  Independent   Assistive Device None   Distance 200 , 350 feet   Stair Management Assistance 6  Modified independent   Stair Management Technique One rail R;With cane   Number of Stairs 12  (ascends and descends step to sequence  RLE ascend,LLE descen)   Balance   Static Sitting Normal   Dynamic Sitting Good   Static Standing Good   Dynamic Standing Good   Ambulatory Good   Activity Tolerance   Activity Tolerance Patient tolerated treatment well   Exercises   Hamstring Stretch Right;Left  (following hamstring TE)   Hip Abduction Standing;Right;Left  (side step 30 feet R/L x 2 and in place 2 sets 15 reps R/L)   Knee PROM Flexion AROM;15 reps;Right;Left  (PRE with green TB ; 2 sets R/LL)   Knee AROM Long Arc Quad 15 reps;Right;Left  (2 sets wtih # 3 lb LE)   Balance training    (TUGT 11 87 sec, 180* turn 4 steps, walking speed 1 0 m/sec)   Assessment   Prognosis Good   Assessment Pt ws seen for 60 minute treatment session which consisted of therapeutic activities, neuromuscular reeducation and therapeutic exercise  Pt gait without lateral trunk lean to the left which was present on evaluation 9/24/19  She currently reports no c/o pain in LE's  She is noted to perform steps with step to sequence and she reports due to leg feeling well and not wanting to aggravate anything   Pt has demonstrated good progress during stay in rehab  She is able to stand with UE support in 1 07 sec which shows good strength in LE's  She appears to be low fall risk obtained by TUGT, 180* turn < 5 steps and walking speed of 1 0 m/sec  Discharge is anticipated for today and patient appears ready for discharge from PT standpoint  Barriers to Discharge None   Goals   Patient Goals To go home, tomorrow is my [birth year]  Short Term Goal #1   (goals met)   PT Treatment Day 5   Plan   Treatment/Interventions Functional transfer training;LE strengthening/ROM; Elevations; Endurance training   Progress Discontinue PT  (goals attained, D/C today)   Recommendation   Recommendation Home with family support   Equipment Recommended   (none)   PT - OK to Discharge Yes       Discharge Summary:    Mo Villatoro was seen for Physical Therapy services from 9/24/19 through 9/30/19 and attended a total of 5 treatment sessions  Sessions consisted of gait training, therapeutic activities, therapeutic exercise and neuromuscular reeducation  Pt achieve or exceeded all goals established on evaluation  She was able to ambulate >300 feet without AD independently  She was educated in use of AD as needed for joint of muscular discomfort  She expresses understanding of joint conservation  She was able to stand up from standard chair without use of UE's  She did use a step to sequence on the stairs due to report of prior groin pain which was not present on last treatment day however patient did not want to take chance to aggravate area before discharge  Functionally and with objective testing she has low risk for fall  Pt does not require any equipment for discharge  She does have appt  With rheumatologist on 10/9/19  No skilled PT services are needed upon discharge       Madan Yao, PT

## 2019-09-30 NOTE — DISCHARGE SUMMARY
Discharge- Kimberly Warren 1960, 59 y o  female MRN: [id number]    Unit/Bed#: [facility] 419-51 Encounter: [id number]    Primary Care Provider: Frank Live MD   Date and time admitted to hospital: 9/23/2019  6:24 PM        * Other inflammatory and immune myopathies, not elsewhere classified  Assessment & Plan  Patient was recently evaluated for bilateral upper extremity swelling tenderness and weakness  Rheumatological workup was ordered  Will check a ESR and a CRP tomorrow prior to discharge  Outpatient follow-up with Rheumatology being arranged as well  No interventions at this time other than physical therapy and occupational therapy  Patient has a repeat flare and worsening of her inflammatory markers may be a candidate for short course of steroids    Is unclear whether she had polymyalgia rheumatica flare versus myositis versus other rheumatological etiology  Continue Tylenol for pain    Frequent headaches  Assessment & Plan  Patient probably has migraine headaches  Also found to have small aneurysms present on MRA of the head and neck  Continue conservative management for now    Mild intermittent asthma with exacerbation  Assessment & Plan  Currently not in exacerbation  Continue home regimen    Class 3 severe obesity with serious comorbidity and body mass index (BMI) of 45 0 to 49 9 in adult [facility])  Assessment & Plan  BMI is 45 66  Counseled on diet exercise and lifestyle modification    Hyperlipidemia  Assessment & Plan  Continue statins    Essential hypertension  Assessment & Plan  Continue Cardizem CD    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, with long-term current use of insulin [facility])  Assessment & Plan  Lab Results   Component Value Date    HGBA1C 14 6 (H) 08/22/2019       Recent Labs     09/28/19  1624 09/28/19  2031 09/29/19  0609 09/29/19  1212   POCGLU 133 199* 128 115       Blood Sugar Average: Last 72 hrs:  (P) 152 [id number] improving control  Continue Lantus and insulin sliding scale with NovoLog standing dose  Also continue Neurontin for neuropathic pain      Discharging Physician / Practitioner: iKmmy Bey MD  PCP: Barb Jessica MD  Admission Date:   Admission Orders (From admission, onward)     Ordered        09/23/19 1831  Admit Patient to  Once                   Discharge Date: 09/30/19    Resolved Problems  Date Reviewed: 9/30/2019    None          Consultations During Hospital Stay:  · Physical therapy and occupational therapy    Procedures Performed:   · None    Significant Findings / Test Results:   Numerous  Please review the rheumatological labs    Incidental Findings:   · None     Test Results Pending at Discharge (will require follow up): · None     Outpatient Tests Requested:  · None    Complications:  None    Reason for Admission:  Weakness and swelling of bilateral arms    Hospital Course:     Itzel Angel is a 59 y o  female patient who originally presented to the hospital on 9/23/2019 due to weakness and swelling of both arms  Patient completed a course of rehab and her weakness and swelling in her arms did improve with a course of rehab however she does have some underlying rheumatological issue with elevated ESR and CRP  She will be following up outpatient with Rheumatology for further workup and treatment  While she was here no course of steroids were administered  Please see above list of diagnoses and related plan for additional information  Condition at Discharge: good     Discharge Day Visit / Exam:     Subjective:  Patient denies any chest pain or shortness of breath  Denies any nausea vomiting or abdominal pain    She states her arm weakness and swelling has improved considerably with rehab  Vitals: Blood Pressure: 131/80 (09/30/19 0713)  Pulse: 80 (09/30/19 0713)  Temperature: 97 6 °F (36 4 °C) (09/30/19 0713)  Temp Source: Temporal (09/30/19 0713)  Respirations: 18 (09/30/19 0713)  Height: 5' 8" (172 7 cm) (09/23/19 1815)  Weight - Scale: (!) 137 kg (301 lb 5 9 oz) (09/25/19 0707)  SpO2: 95 % (09/30/19 0713)  Exam:   Physical Exam   Constitutional: She is oriented to person, place, and time  She appears well-developed and well-nourished  HENT:   Head: Normocephalic and atraumatic  Mouth/Throat: Oropharynx is clear and moist    Eyes: Conjunctivae and EOM are normal    Neck: Normal range of motion  Neck supple  Cardiovascular: Normal rate, regular rhythm and normal heart sounds  Pulmonary/Chest: Effort normal and breath sounds normal    Abdominal: Soft  Bowel sounds are normal  She exhibits no mass  There is no tenderness  There is no rebound and no guarding  Genitourinary:   Genitourinary Comments: deferred   Musculoskeletal:   Power is 4 5 x 5 bilateral upper and lower extremity  Insert will improvement in the swelling however there is still some swelling noted in bilateral upper extremity especially the left more than the right   Neurological: She is alert and oriented to person, place, and time  She has normal reflexes  Cranial nerves 2-12 are normal   Normal neurological exam   Skin: Skin is warm and dry  No rash noted  Psychiatric: She has a normal mood and affect  Nursing note and vitals reviewed  Discussion with Family:  None    Discharge instructions/Information to patient and family:   See after visit summary for information provided to patient and family  Provisions for Follow-Up Care:  See after visit summary for information related to follow-up care and any pertinent home health orders  Disposition:     Home with [facility] Services (Reminder: Complete face to face encounter)    For Discharges to [facility]F:   · Not Applicable to this Patient - Not Applicable to this Patient    Planned Readmission:  None     Discharge Statement:  I spent 35 minutes discharging the patient  This time was spent on the day of discharge   I had direct contact with the patient on the day of discharge  Greater than 50% of the total time was spent examining patient, answering all patient questions, arranging and discussing plan of care with patient as well as directly providing post-discharge instructions  Additional time then spent on discharge activities  Discharge Medications:  See after visit summary for reconciled discharge medications provided to patient and family        ** Please Note: This note has been constructed using a voice recognition system **

## 2019-10-08 ENCOUNTER — OFFICE VISIT (OUTPATIENT)
Dept: INTERNAL MEDICINE CLINIC | Facility: CLINIC | Age: 59
End: 2019-10-08
Payer: COMMERCIAL

## 2019-10-08 VITALS
SYSTOLIC BLOOD PRESSURE: 128 MMHG | TEMPERATURE: 98.8 F | DIASTOLIC BLOOD PRESSURE: 80 MMHG | BODY MASS INDEX: 44.41 KG/M2 | HEIGHT: 68 IN | OXYGEN SATURATION: 98 % | WEIGHT: 293 LBS | HEART RATE: 90 BPM

## 2019-10-08 DIAGNOSIS — IMO0002 UNCONTROLLED TYPE 2 DIABETES MELLITUS WITH OPHTHALMIC COMPLICATION, WITH LONG-TERM CURRENT USE OF INSULIN: ICD-10-CM

## 2019-10-08 PROCEDURE — 99495 TRANSJ CARE MGMT MOD F2F 14D: CPT | Performed by: INTERNAL MEDICINE

## 2019-10-08 RX ORDER — INSULIN GLARGINE 100 [IU]/ML
30 INJECTION, SOLUTION SUBCUTANEOUS EVERY 12 HOURS SCHEDULED
Qty: 5 PEN | Refills: 0
Start: 2019-10-08 | End: 2020-01-07 | Stop reason: SDUPTHER

## 2019-10-08 NOTE — PROGRESS NOTES
TCM Call (since 9/7/2019)     Date and time call was made  9/25/2019  4:17 PM    Hospital care reviewed  Discussed with Inpatient Physician    Patient was hospitialized at  [facility] [address]    Date of Admission  09/16/19    Date of discharge  09/23/19    Diagnosis  r upper extremety swelling and pain     Disposition  Nursing Home      TCM Call (since 9/7/2019)     None        Assessment/Plan:  Some possible concern for temporal arteritis versus polymyalgia rheumatica  We discussed that the treatment options for both of these would involve systemic steroids, she has uncontrolled diabetes  Dietary restrictions have not been very good in the past   Recent sed rate and CRP still continued to be elevated  Hemoglobin was around 8, possibly anemia of chronic disease but even before her acute symptoms, her hemoglobin was around 10  We had discussed in the past for the need of colonoscopy  Stool testing during the hospital stay were with the normal limits and negative for occult blood  Advised her to continue close monitoring of blood sugars, call the office if any concerns with hyper or hyperlipidemia  Blood pressure seems well controlled  Advised to reschedule her appointment with Neurology  She will be undergoing physical and occupational therapy as an outpatient  Influenza vaccine was already completed during her hospital stay  Diagnoses and all orders for this visit:    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, with long-term current use of insulin (HCC)  -     insulin lispro (HUMALOG KWIKPEN) 100 units/mL injection pen; Inject 15 Units under the skin 3 (three) times a day with meals 35 units before meals or 40 if BG over 200  -     BASAGLAR KWIKPEN 100 units/mL injection pen;  Inject 30 Units under the skin every 12 (twelve) hours          Subjective:   Chief Complaint   Patient presents with    Transition of Care Management     [place] [facility] 09/30/19 [facility]         Patient ID: Gilberto Palomino is a 62 y o  female  She comes in for follow-up after hospitalization, she was initially admitted for my headaches which were evaluated by Neurology, imaging of the central nervous system did not show any acute abnormalities  He has was considered to be migraine headaches  Then she was recently for pain and swelling in her upper extremities  She was evaluated by vascular surgery, upper extremity imaging did not show any signs concerns for DVT or compartment syndrome  She had initially been treated with antibiotics but this was later found out that was not an infectious process  She had some testing for sed rate and CRP and these were found to be elevated  She will be seeing a rheumatologist tomorrow  She notes that her fasting blood sugars are generally less than 130 and occasional post prandials are 150  She is taking basaglar 30 units twice a day and Humalog 15-14 units 3 times a day  She is trying to eat better, she still struggles with being able to prepare her meals  Lack of social support  Headaches continue with occasional dizziness  The following portions of the patient's history were reviewed and updated as appropriate: current medications, past medical history, past social history and past surgical history      PHQ-9 Depression Screening    PHQ-9:    Frequency of the following problems over the past two weeks:                Current Outpatient Medications:     albuterol (PROVENTIL HFA) 90 mcg/act inhaler, Inhale 2 puffs every 6 (six) hours as needed for wheezing For another 24 hours use every 4 hours standing, Disp: 6 7 g, Rfl: 0    aspirin 81 MG tablet, Take 1 tablet by mouth daily, Disp: , Rfl:     atorvastatin (LIPITOR) 40 mg tablet, Take 1 tablet (40 mg total) by mouth daily, Disp: 90 tablet, Rfl: 1    BASAGLAR KWIKPEN 100 units/mL injection pen, Inject 30 Units under the skin every 12 (twelve) hours, Disp: 5 pen, Rfl: 0    SARAH MICROLET LANCETS lancets, Inject 1 applicator into the skin 4 (four) times a day, Disp: , Rfl:     Blood Glucose Monitoring Suppl (CONTOUR NEXT MONITOR) w/Device KIT, Disp 1 meter dx E11 9, may submitted any contour next meter  If not covered let us know we can change strips, Disp: 1 kit, Rfl: 1    Blood Pressure Monitor KIT, Check blood pressures BID, Disp: 1 each, Rfl: 0    budesonide-formoterol (SYMBICORT) 80-4 5 MCG/ACT inhaler, Inhale 2 puffs 2 (two) times a day Rinse mouth after use  (Patient taking differently: Inhale 2 puffs as needed Rinse mouth after use ), Disp: 1 Inhaler, Rfl: 5    Cholecalciferol (VITAMIN D3) 3000 units TABS, Take by mouth daily , Disp: , Rfl:     CONTOUR NEXT TEST test strip, Test blood sugar 3x per day dx E11 9, Disp: 300 each, Rfl: 1    diltiazem (TIAZAC) 420 MG 24 hr capsule, Take 1 capsule (420 mg total) by mouth daily, Disp: 30 capsule, Rfl: 5    gabapentin (NEURONTIN) 100 mg capsule, Take 1 capsule (100 mg total) by mouth 2 (two) times a day, Disp: 180 capsule, Rfl: 1    insulin lispro (HUMALOG KWIKPEN) 100 units/mL injection pen, Inject 15 Units under the skin 3 (three) times a day with meals 35 units before meals or 40 if BG over 200, Disp: 5 pen, Rfl: 0    Insulin Pen Needle (BD PEN NEEDLE DERRICK U/F) 32G X 4 MM MISC, Inject 1 applicator under the skin 4 (four) times a day, Disp: , Rfl:     losartan (COZAAR) 100 MG tablet, Take 1 tablet (100 mg total) by mouth daily, Disp: 30 tablet, Rfl: 5    Review of Systems   Constitutional: Positive for fatigue  Negative for fever and unexpected weight change  HENT: Negative for ear pain, hearing loss and sore throat  Eyes: Negative for pain and discharge  Respiratory: Negative for cough, chest tightness and shortness of breath  Cardiovascular: Negative for chest pain and palpitations  Gastrointestinal: Negative for abdominal pain, blood in stool, constipation, diarrhea and nausea  Genitourinary: Negative for dysuria, frequency and hematuria  Musculoskeletal: Positive for arthralgias and back pain  Negative for joint swelling  Skin: Negative for rash  Allergic/Immunologic: Negative for immunocompromised state  Neurological: Positive for dizziness and headaches  Hematological: Negative for adenopathy  Psychiatric/Behavioral: Negative for confusion and sleep disturbance  Objective:  /80 (BP Location: Left arm, Patient Position: Sitting, Cuff Size: Standard)   Pulse 90   Temp 98 8 °F (37 1 °C) (Tympanic)   Ht 5' 8" (1 727 m)   Wt (!) 138 kg (304 lb 6 4 oz)   SpO2 98%   BMI 46 28 kg/m²      Physical Exam   Constitutional: She appears well-developed and well-nourished  She appears ill  HENT:   Head: Normocephalic and atraumatic  Right Ear: Tympanic membrane normal    Left Ear: Tympanic membrane normal    Nose: Nose normal    Mouth/Throat: Oropharynx is clear and moist  No posterior oropharyngeal edema or posterior oropharyngeal erythema  Eyes: Pupils are equal, round, and reactive to light  Conjunctivae are normal  Right eye exhibits no discharge  Left eye exhibits no discharge  Neck: Normal range of motion  Neck supple  No thyromegaly present  Cardiovascular: Normal rate, regular rhythm, S1 normal, S2 normal and normal heart sounds  PMI is not displaced  No murmur heard  Pulmonary/Chest: Effort normal and breath sounds normal  No accessory muscle usage  No apnea  No respiratory distress  She has no rhonchi  She has no rales  Abdominal: Soft  Normal appearance and bowel sounds are normal  She exhibits no shifting dullness  There is no hepatosplenomegaly  There is no tenderness  There is no rebound and no CVA tenderness  Musculoskeletal: Normal range of motion  She exhibits no edema or tenderness  Lymphadenopathy:     She has no cervical adenopathy  Neurological: She is alert  Skin: Skin is warm and intact  No rash noted  Psychiatric: She has a normal mood and affect   Her speech is normal    Nursing note and vitals reviewed          Recent Results (from the past 1008 hour(s))   CBC and differential    Collection Time: 09/09/19  6:16 PM   Result Value Ref Range    WBC 14 00 (H) 4 50 - 11 00 Thousand/uL    RBC 4 17 4 00 - 5 20 Million/uL    Hemoglobin 10 4 (L) 12 0 - 16 0 g/dL    Hematocrit 32 0 (L) 36 0 - 46 0 %    MCV 77 (L) 80 - 100 fL    MCH 24 8 (L) 26 0 - 34 0 pg    MCHC 32 3 31 0 - 36 0 g/dL    RDW 15 7 (H) <15 3 %    MPV 8 2 (L) 8 9 - 12 7 fL    Platelets 478 317 - 410 Thousands/uL    Neutrophils Relative 78 (H) 45 - 65 %    Lymphocytes Relative 14 (L) 25 - 45 %    Monocytes Relative 6 1 - 10 %    Eosinophils Relative 2 0 - 6 %    Basophils Relative 1 0 - 1 %    Neutrophils Absolute 10 90 (H) 1 80 - 7 80 Thousands/µL    Lymphocytes Absolute 1 90 0 50 - 4 00 Thousands/µL    Monocytes Absolute 0 80 0 20 - 0 90 Thousand/µL    Eosinophils Absolute 0 20 0 00 - 0 40 Thousand/µL    Basophils Absolute 0 10 0 00 - 0 10 Thousands/µL   Protime-INR    Collection Time: 09/09/19  6:16 PM   Result Value Ref Range    Protime 10 0 9 8 - 12 0 seconds    INR 0 91 0 91 - 1 09   APTT    Collection Time: 09/09/19  6:16 PM   Result Value Ref Range    PTT 33 (H) 25 - 32 seconds   Troponin I    Collection Time: 09/09/19  6:16 PM   Result Value Ref Range    Troponin I <0 01 0 00 - 0 03 ng/mL   Smear Review(Phlebs Do Not Order)    Collection Time: 09/09/19  6:16 PM   Result Value Ref Range    RBC Morphology Present     Anisocytosis Present     Hypochromia Present     Microcytes Present     Poikilocytes Present     Platelet Estimate Adequate Adequate   ECG 12 lead    Collection Time: 09/09/19  6:51 PM   Result Value Ref Range    Ventricular Rate 99 BPM    Atrial Rate 99 BPM    UT Interval 168 ms    QRSD Interval 94 ms    QT Interval 386 ms    QTC Interval 495 ms    P Axis 46 degrees    QRS Axis -1 degrees    T Wave Axis 35 degrees   Comprehensive metabolic panel    Collection Time: 09/09/19  7:26 PM   Result Value Ref Range    Sodium 137 137 - 147 mmol/L    Potassium 3 7 3 6 - 5 0 mmol/L    Chloride 95 (L) 97 - 108 mmol/L    CO2 35 (H) 22 - 30 mmol/L    ANION GAP 7 5 - 14 mmol/L    BUN 26 (H) 5 - 25 mg/dL    Creatinine 1 09 0 60 - 1 20 mg/dL    Glucose 164 (H) 70 - 99 mg/dL    Calcium 9 5 8 4 - 10 2 mg/dL    AST 23 14 - 36 U/L    ALT 28 9 - 52 U/L    Alkaline Phosphatase 168 (H) 43 - 122 U/L    Total Protein 7 8 5 9 - 8 4 g/dL    Albumin 4 0 3 0 - 5 2 g/dL    Total Bilirubin 0 70 <1 30 mg/dL    eGFR 64 >60 ml/min/1 73sq m   CBC and differential    Collection Time: 09/16/19  9:45 AM   Result Value Ref Range    WBC 14 70 (H) 4 50 - 11 00 Thousand/uL    RBC 3 87 (L) 4 00 - 5 20 Million/uL    Hemoglobin 9 5 (L) 12 0 - 16 0 g/dL    Hematocrit 29 2 (L) 36 0 - 46 0 %    MCV 75 (L) 80 - 100 fL    MCH 24 6 (L) 26 0 - 34 0 pg    MCHC 32 5 31 0 - 36 0 g/dL    RDW 15 1 <15 3 %    MPV 8 7 (L) 8 9 - 12 7 fL    Platelets 041 (H) 335 - 450 Thousands/uL    Neutrophils Relative 77 (H) 45 - 65 %    Lymphocytes Relative 12 (L) 25 - 45 %    Monocytes Relative 8 1 - 10 %    Eosinophils Relative 2 0 - 6 %    Basophils Relative 2 (H) 0 - 1 %    Neutrophils Absolute 11 30 (H) 1 80 - 7 80 Thousands/µL    Lymphocytes Absolute 1 80 0 50 - 4 00 Thousands/µL    Monocytes Absolute 1 20 (H) 0 20 - 0 90 Thousand/µL    Eosinophils Absolute 0 20 0 00 - 0 40 Thousand/µL    Basophils Absolute 0 20 (H) 0 00 - 0 10 Thousands/µL   Comprehensive metabolic panel    Collection Time: 09/16/19  9:45 AM   Result Value Ref Range    Sodium 135 (L) 137 - 147 mmol/L    Potassium 4 5 3 6 - 5 0 mmol/L    Chloride 92 (L) 97 - 108 mmol/L    CO2 34 (H) 22 - 30 mmol/L    ANION GAP 9 5 - 14 mmol/L    BUN 26 (H) 5 - 25 mg/dL    Creatinine 0 84 0 60 - 1 20 mg/dL    Glucose 350 (H) 70 - 99 mg/dL    Calcium 9 0 8 4 - 10 2 mg/dL    AST 64 (H) 14 - 36 U/L    ALT 59 (H) 9 - 52 U/L    Alkaline Phosphatase 325 (H) 43 - 122 U/L    Total Protein 8 0 5 9 - 8 4 g/dL    Albumin 3 8 3 0 - 5 2 g/dL    Total Bilirubin 1 10 <1 30 mg/dL    eGFR 87 >60 ml/min/1 73sq m   Blood culture #1    Collection Time: 09/16/19  9:45 AM   Result Value Ref Range    Blood Culture No Growth After 5 Days  B-type natriuretic peptide    Collection Time: 09/16/19  9:45 AM   Result Value Ref Range    NT-proBNP 136 0 - 299 pg/mL   Troponin I    Collection Time: 09/16/19  9:45 AM   Result Value Ref Range    Troponin I <0 01 0 00 - 0 03 ng/mL   Smear Review(Phlebs Do Not Order)    Collection Time: 09/16/19  9:45 AM   Result Value Ref Range    RBC Morphology Present     Anisocytosis Present     Hypochromia Present     Microcytes Present     Platelet Estimate Increased (A) Adequate    Helmet Cells     TSH, 3rd generation    Collection Time: 09/16/19  9:45 AM   Result Value Ref Range    TSH 3RD GENERATON 1 710 0 465 - 4 680 uIU/mL   Sedimentation rate, automated    Collection Time: 09/16/19  9:45 AM   Result Value Ref Range    Sed Rate >130 (H) 1 - 20 mm/hour   Uric acid    Collection Time: 09/16/19  9:45 AM   Result Value Ref Range    Uric Acid 3 3 2 7 - 7 5 mg/dL   Iron Saturation %    Collection Time: 09/16/19  9:45 AM   Result Value Ref Range    Iron Saturation 15 %    TIBC 215 (L) 250 - 450 ug/dL    Iron 33 (L) 50 - 170 ug/dL   Ferritin    Collection Time: 09/16/19  9:45 AM   Result Value Ref Range    Ferritin 560 (H) 8 - 388 ng/mL   Blood culture #2    Collection Time: 09/16/19 10:28 AM   Result Value Ref Range    Blood Culture No Growth After 5 Days      ECG 12 lead    Collection Time: 09/16/19 10:35 AM   Result Value Ref Range    Ventricular Rate 98 BPM    Atrial Rate 98 BPM    NC Interval 172 ms    QRSD Interval 96 ms    QT Interval 396 ms    QTC Interval 505 ms    P Axis 30 degrees    QRS Axis -3 degrees    T Wave Axis 17 degrees   UA w Reflex to Microscopic w Reflex to Culture    Collection Time: 09/16/19 12:01 PM   Result Value Ref Range    Color, UA Cathi (A) Straw, Yellow, Pale Yellow    Clarity, UA Clear Clear, Other    Specific [place], UA 1 015 1 003 - 1 040    pH, UA 6 0 4 5, 5 0, 5 5, 6 0, 6 5, 7 0, 7 5, 8 0    Leukocytes, UA Negative Negative    Nitrite, UA Negative Negative    Protein, UA 30 (1+) (A) Negative mg/dl    Glucose,  (1/4%) (A) Negative mg/dl    Ketones, UA Negative Negative mg/dl    Bilirubin, UA Negative Negative    Blood, UA Negative Negative    UROBILINOGEN UA 1 0 1 0, Negative mg/dL   Urine Microscopic    Collection Time: 09/16/19 12:01 PM   Result Value Ref Range    RBC, UA None Seen None Seen, 0-5 /hpf    WBC, UA 1-2 (A) None Seen, 0-5, 5-55, 5-65 /hpf    Epithelial Cells Occasional None Seen, Occasional /hpf    Bacteria, UA Occasional None Seen, Occasional /hpf   CK    Collection Time: 09/16/19  5:13 PM   Result Value Ref Range    Total CK 55 30 - 135 U/L   Hepatitis panel, acute    Collection Time: 09/16/19  6:35 PM   Result Value Ref Range    Hepatitis B Surface Ag Non-reactive Non-reactive, NonReactive - Confirmed    Hep A IgM Non-reactive Non-reactive, Equivocal-Suggest Recollect    Hepatitis C Ab Equivocal (A) Non-reactive    Hep B C IgM Non-reactive Non-reactive   Fingerstick Glucose (POCT)    Collection Time: 09/16/19  8:22 PM   Result Value Ref Range    POC Glucose 359 (H) 65 - 140 mg/dl   Fingerstick Glucose (POCT)    Collection Time: 09/16/19 11:19 PM   Result Value Ref Range    POC Glucose 321 (H) 65 - 140 mg/dl   Fingerstick Glucose (POCT)    Collection Time: 09/17/19  3:12 AM   Result Value Ref Range    POC Glucose 307 (H) 65 - 140 mg/dl   Comprehensive metabolic panel    Collection Time: 09/17/19  5:24 AM   Result Value Ref Range    Sodium 135 (L) 137 - 147 mmol/L    Potassium 4 1 3 6 - 5 0 mmol/L    Chloride 95 (L) 97 - 108 mmol/L    CO2 33 (H) 22 - 30 mmol/L    ANION GAP 7 5 - 14 mmol/L    BUN 23 5 - 25 mg/dL    Creatinine 0 84 0 60 - 1 20 mg/dL    Glucose 312 (H) 70 - 99 mg/dL    Calcium 8 8 8 4 - 10 2 mg/dL    AST 45 (H) 14 - 36 U/L    ALT 57 (H) 9 - 52 U/L    Alkaline Phosphatase 291 (H) 43 - 122 U/L Total Protein 7 1 5 9 - 8 4 g/dL    Albumin 3 5 3 0 - 5 2 g/dL    Total Bilirubin 0 60 <1 30 mg/dL    eGFR 87 >60 ml/min/1 73sq m   CBC and differential    Collection Time: 09/17/19  5:24 AM   Result Value Ref Range    WBC 13 10 (H) 4 50 - 11 00 Thousand/uL    RBC 3 59 (L) 4 00 - 5 20 Million/uL    Hemoglobin 8 7 (L) 12 0 - 16 0 g/dL    Hematocrit 27 2 (L) 36 0 - 46 0 %    MCV 76 (L) 80 - 100 fL    MCH 24 2 (L) 26 0 - 34 0 pg    MCHC 31 9 31 0 - 36 0 g/dL    RDW 15 5 (H) <15 3 %    MPV 8 0 (L) 8 9 - 12 7 fL    Platelets 959 (H) 012 - 450 Thousands/uL    Neutrophils Relative 74 (H) 45 - 65 %    Lymphocytes Relative 14 (L) 25 - 45 %    Monocytes Relative 7 1 - 10 %    Eosinophils Relative 4 0 - 6 %    Basophils Relative 1 0 - 1 %    Neutrophils Absolute 9 70 (H) 1 80 - 7 80 Thousands/µL    Lymphocytes Absolute 1 90 0 50 - 4 00 Thousands/µL    Monocytes Absolute 0 90 0 20 - 0 90 Thousand/µL    Eosinophils Absolute 0 50 (H) 0 00 - 0 40 Thousand/µL    Basophils Absolute 0 10 0 00 - 0 10 Thousands/µL   ECG 12 lead    Collection Time: 09/17/19  5:36 AM   Result Value Ref Range    Ventricular Rate 90 BPM    Atrial Rate 90 BPM    CT Interval 160 ms    QRSD Interval 94 ms    QT Interval 398 ms    QTC Interval 487 ms    P [place] 21 degrees    QRS Axis -4 degrees    T Wave Axis 11 degrees   Fingerstick Glucose (POCT)    Collection Time: 09/17/19  5:48 AM   Result Value Ref Range    POC Glucose 291 (H) 65 - 140 mg/dl   Fingerstick Glucose (POCT)    Collection Time: 09/17/19 11:26 AM   Result Value Ref Range    POC Glucose 212 (H) 65 - 140 mg/dl   RF Screen w/ Reflex to Titer    Collection Time: 09/17/19 12:22 PM   Result Value Ref Range    Rheumatoid Factor Negative Negative   Fingerstick Glucose (POCT)    Collection Time: 09/17/19  4:38 PM   Result Value Ref Range    POC Glucose 311 (H) 65 - 140 mg/dl   Fingerstick Glucose (POCT)    Collection Time: 09/17/19  8:27 PM   Result Value Ref Range    POC Glucose 386 (H) 65 - 140 mg/dl Fingerstick Glucose (POCT)    Collection Time: 09/18/19  4:58 AM   Result Value Ref Range    POC Glucose 277 (H) 65 - 140 mg/dl   JUAN Screen w/ Reflex to Titer/Pattern    Collection Time: 09/18/19  5:39 AM   Result Value Ref Range    JUAN Positive (A) Negative   Sedimentation rate, automated    Collection Time: 09/18/19  5:39 AM   Result Value Ref Range    Sed Rate 127 (H) 0 - 20 mm/hour   JUAN Titer (Reflex test-do not order)    Collection Time: 09/18/19  5:39 AM   Result Value Ref Range    JUAN Titer 1 Titer of 80     JUAN Pattern 1 Homogeneous pattern    Fingerstick Glucose (POCT)    Collection Time: 09/18/19 11:32 AM   Result Value Ref Range    POC Glucose 265 (H) 65 - 140 mg/dl   Fingerstick Glucose (POCT)    Collection Time: 09/18/19  3:36 PM   Result Value Ref Range    POC Glucose 334 (H) 65 - 140 mg/dl   Fingerstick Glucose (POCT)    Collection Time: 09/18/19  8:06 PM   Result Value Ref Range    POC Glucose 331 (H) 65 - 140 mg/dl   CBC and differential    Collection Time: 09/19/19  5:37 AM   Result Value Ref Range    WBC 15 50 (H) 4 50 - 11 00 Thousand/uL    RBC 3 52 (L) 4 00 - 5 20 Million/uL    Hemoglobin 8 5 (L) 12 0 - 16 0 g/dL    Hematocrit 26 8 (L) 36 0 - 46 0 %    MCV 76 (L) 80 - 100 fL    MCH 24 2 (L) 26 0 - 34 0 pg    MCHC 31 8 31 0 - 36 0 g/dL    RDW 15 6 (H) <15 3 %    MPV 7 8 (L) 8 9 - 12 7 fL    Platelets 084 (H) 322 - 450 Thousands/uL   Comprehensive metabolic panel    Collection Time: 09/19/19  5:37 AM   Result Value Ref Range    Sodium 133 (L) 137 - 147 mmol/L    Potassium 4 5 3 6 - 5 0 mmol/L    Chloride 99 97 - 108 mmol/L    CO2 29 22 - 30 mmol/L    ANION GAP 5 5 - 14 mmol/L    BUN 12 5 - 25 mg/dL    Creatinine 0 69 0 60 - 1 20 mg/dL    Glucose 276 (H) 70 - 99 mg/dL    Calcium 8 6 8 4 - 10 2 mg/dL    AST 36 14 - 36 U/L    ALT 50 9 - 52 U/L    Alkaline Phosphatase 306 (H) 43 - 122 U/L    Total Protein 7 0 5 9 - 8 4 g/dL    Albumin 3 3 3 0 - 5 2 g/dL    Total Bilirubin 0 60 <1 30 mg/dL    eGFR 109 >60 ml/min/1 73sq m   C-reactive protein    Collection Time: 09/19/19  5:37 AM   Result Value Ref Range    CRP >90 0 (H) <10 0 mg/L   Manual Differential (Non Wam)    Collection Time: 09/19/19  5:37 AM   Result Value Ref Range    Segmented % 72 (H) 45 - 65 %    Lymphocytes % 16 (L) 25 - 45 %    Monocytes % 10 1 - 10 %    Eosinophils, % 2 0 - 6 %    Neutrophils Absolute 11 16 (H) 1 80 - 7 80 Thousand/uL    Lymphocytes Absolute 2 48 0 50 - 4 00 Thousand/uL    Monocytes Absolute 1 55 (H) 0 20 - 0 90 Thousand/uL    Eosinophils Absolute 0 31 0 00 - 0 40 Thousand/uL    Total Counted 100     RBC Morphology Present     Anisocytosis Present     Hypochromia Present     Microcytes Present     Platelet Estimate Increased (A) Adequate   Fingerstick Glucose (POCT)    Collection Time: 09/19/19  5:50 AM   Result Value Ref Range    POC Glucose 273 (H) 65 - 140 mg/dl   Fingerstick Glucose (POCT)    Collection Time: 09/19/19 11:02 AM   Result Value Ref Range    POC Glucose 294 (H) 65 - 912 mg/dl   Cyclic citrul peptide antibody, IgG    Collection Time: 09/19/19 12:40 PM   Result Value Ref Range    Cyclic Citrullin Peptide Ab 4 0 - 19 units   Anti-neutrophilic cytoplasmic antibody    Collection Time: 09/19/19 12:40 PM   Result Value Ref Range    C-ANCA <1:20 Neg:<1:20 titer    Atypical pANCA <1:20 Neg:<1:20 titer    MPO AB <9 0 0 0 - 9 0 U/mL    CT-3 AB <3 5 0 0 - 3 5 U/mL    P-ANCA <1:20 Neg:<1:20 titer   Lupus anticoagulant    Collection Time: 09/19/19 12:40 PM   Result Value Ref Range    PTT Lupus Anticoagulant 51 7 0 0 - 51 9 sec    Dilute Viper Venom Time 49 7 (H) 0 0 - 47 0 sec    DILUTE PROTHROMBIN TIME(DPT) 55 6 (H) 0 0 - 55 0 sec    THROMBIN TIME (DRVW) 18 6 0 0 - 23 0 sec    DPT CONFIRM RATIO 1 11 0 00 - 1 40 Ratio    LUPUS REFLEX INTERPRETATION Comment:    Cardiolipin antibody    Collection Time: 09/19/19 12:40 PM   Result Value Ref Range    Anticardiolipin IgA <9 0 - 11 APL U/mL    Anticardiolipin IgG <9 0 - 14 GPL U/mL Anticardiolipin IgM 13 (H) 0 - 12 MPL U/mL   DRVVT Mix-LA    Collection Time: 09/19/19 12:40 PM   Result Value Ref Range    dRVVT Mix Interp  42 7 0 0 - 47 0 sec   Blood culture    Collection Time: 09/19/19 12:49 PM   Result Value Ref Range    Blood Culture No Growth After 5 Days  Blood culture    Collection Time: 09/19/19 12:50 PM   Result Value Ref Range    Blood Culture No Growth After 5 Days      Lyme disease, PCR    Collection Time: 09/19/19  2:07 PM   Result Value Ref Range    Lyme Disease(B burgdorferi)PCR Negative Negative   Fingerstick Glucose (POCT)    Collection Time: 09/19/19  3:55 PM   Result Value Ref Range    POC Glucose 350 (H) 65 - 140 mg/dl   Fingerstick Glucose (POCT)    Collection Time: 09/19/19  9:53 PM   Result Value Ref Range    POC Glucose 226 (H) 65 - 140 mg/dl   Fingerstick Glucose (POCT)    Collection Time: 09/20/19  5:54 AM   Result Value Ref Range    POC Glucose 194 (H) 65 - 140 mg/dl   Fingerstick Glucose (POCT)    Collection Time: 09/20/19 10:58 AM   Result Value Ref Range    POC Glucose 270 (H) 65 - 140 mg/dl   Fingerstick Glucose (POCT)    Collection Time: 09/20/19  3:52 PM   Result Value Ref Range    POC Glucose 109 65 - 140 mg/dl   Sjogren's Antibodies    Collection Time: 09/20/19  4:13 PM   Result Value Ref Range    SS-A (RO) Ab <0 2 0 0 - 0 9 AI    SS-B (LA) Ab <0 2 0 0 - 0 9 AI   Protein electrophoresis, serum    Collection Time: 09/20/19  4:13 PM   Result Value Ref Range    A/G Ratio 0 56 (L) 1 10 - 1 80    Albumin Electrophoresis 36 0 (L) 52 0 - 65 0 %    Albumin CONC 2 41 (L) 3 50 - 5 00 g/dl    Alpha 1 7 7 (H) 2 5 - 5 0 %    ALPHA 1 CONC 0 52 (H) 0 10 - 0 40 g/dL    Alpha 2 21 0 (H) 7 0 - 13 0 %    ALPHA 2 CONC 1 41 (H) 0 40 - 1 20 g/dL    Beta-1 9 8 5 0 - 13 0 %    BETA 1 CONC 0 66 0 40 - 0 80 g/dL    Beta-2 7 6 2 0 - 8 0 %    BETA 2 CONC 0 51 (H) 0 20 - 0 50 g/dL    Gamma Globulin 17 9 12 0 - 22 0 %    GAMMA CONC 1 20 0 50 - 1 60 g/dL    Total Protein 6 7 6 4 - 8 2 g/dL    SPEP Interpretation       No monoclonal bands noted   Reviewed by: Victorino Lemon MD **Electronic Signature**   Complement, total    Collection Time: 09/20/19  4:13 PM   Result Value Ref Range    Compl, Total (CH50) >60 42 - [id number] U/mL   CEA    Collection Time: 09/20/19  4:13 PM   Result Value Ref Range    CEA 1 0 0 0 - 3 0 ng/mL   Procalcitonin    Collection Time: 09/20/19  4:13 PM   Result Value Ref Range    Procalcitonin 0 15 <=0 25 ng/ml   Beta-2 glycoprotein antibodies    Collection Time: 09/20/19  4:13 PM   Result Value Ref Range    Beta-2 Glyco 1 IgG <9 0 - 20 GPI IgG units    Beta-2 Glyco 1 IgA <9 0 - 25 GPI IgA units    Beta-2 Glyco 1 IgM 12 0 - 32 GPI IgM units   Fingerstick Glucose (POCT)    Collection Time: 09/20/19  8:28 PM   Result Value Ref Range    POC Glucose 183 (H) 65 - 140 mg/dl   Vitamin B12    Collection Time: 09/21/19  5:02 AM   Result Value Ref Range    Vitamin B-12 1,049 (H) 100 - 900 pg/mL   Paraneoplastic Profile II    Collection Time: 09/21/19  5:02 AM   Result Value Ref Range    ANTI-HU ABS <1:10 titer    ANTI-RI ABS <1:10 titer   Sedimentation rate, automated    Collection Time: 09/21/19  5:02 AM   Result Value Ref Range    Sed Rate 52 (H) 0 - 20 mm/hour   Anti-DNA antibody, double-stranded    Collection Time: 09/21/19  5:02 AM   Result Value Ref Range    ds DNA Ab <1 0 - 9 IU/mL   CBC and differential    Collection Time: 09/21/19  5:02 AM   Result Value Ref Range    WBC 15 20 (H) 4 50 - 11 00 Thousand/uL    RBC 3 43 (L) 4 00 - 5 20 Million/uL    Hemoglobin 8 2 (L) 12 0 - 16 0 g/dL    Hematocrit 26 1 (L) 36 0 - 46 0 %    MCV 76 (L) 80 - 100 fL    MCH 24 0 (L) 26 0 - 34 0 pg    MCHC 31 6 31 0 - 36 0 g/dL    RDW 15 3 (H) <15 3 %    MPV 7 9 (L) 8 9 - 12 7 fL    Platelets 904 (H) 040 - 450 Thousands/uL    Neutrophils Relative 76 (H) 45 - 65 %    Lymphocytes Relative 15 (L) 25 - 45 %    Monocytes Relative 4 1 - 10 %    Eosinophils Relative 3 0 - 6 %    Basophils Relative 2 (H) 0 - 1 %    Neutrophils Absolute 11 60 (H) 1 80 - 7 80 Thousands/µL    Lymphocytes Absolute 2 30 0 50 - 4 00 Thousands/µL    Monocytes Absolute 0 60 0 20 - 0 90 Thousand/µL    Eosinophils Absolute 0 50 (H) 0 00 - 0 40 Thousand/µL    Basophils Absolute 0 20 (H) 0 00 - 0 10 Thousands/µL   Comprehensive metabolic panel    Collection Time: 09/21/19  5:02 AM   Result Value Ref Range    Sodium 141 137 - 147 mmol/L    Potassium 4 0 3 6 - 5 0 mmol/L    Chloride 102 97 - 108 mmol/L    CO2 31 (H) 22 - 30 mmol/L    ANION GAP 8 5 - 14 mmol/L    BUN 14 5 - 25 mg/dL    Creatinine 0 75 0 60 - 1 20 mg/dL    Glucose 145 (H) 70 - 99 mg/dL    Calcium 9 2 8 4 - 10 2 mg/dL    AST 29 14 - 36 U/L    ALT 35 9 - 52 U/L    Alkaline Phosphatase 237 (H) 43 - 122 U/L    Total Protein 6 6 5 9 - 8 4 g/dL    Albumin 3 1 3 0 - 5 2 g/dL    Total Bilirubin 0 40 <1 30 mg/dL    eGFR 100 >60 ml/min/1 73sq m   LD,Blood    Collection Time: 09/21/19  5:02 AM   Result Value Ref Range     313 - 618 U/L   Beta 2 microglobulin, serum    Collection Time: 09/21/19  5:02 AM   Result Value Ref Range    Beta-2 Microglobulin 2 4 0 6 - 2 4 mg/L   Erythropoietin    Collection Time: 09/21/19  5:02 AM   Result Value Ref Range    Erythropoietin 30 9 (H) 2 6 - 18 5 mIU/mL   Folate    Collection Time: 09/21/19  5:02 AM   Result Value Ref Range    Folate 4 5 3 1 - 17 5 ng/mL   Haptoglobin    Collection Time: 09/21/19  5:02 AM   Result Value Ref Range    Haptoglobin 660 (HH) 34 - 200 mg/dL   Hemolysis Smear    Collection Time: 09/21/19  5:02 AM   Result Value Ref Range    Hemolysis Smear No Schistocytes or Helmet Cells noted    IgG, IgA, IgM    Collection Time: 09/21/19  5:02 AM   Result Value Ref Range     0 70 0 - 400 0 mg/dL    IGG 1,160 0 700 0-1,600 0 mg/dL     0 40 0 - 230 0 mg/dL   Immunoglobulin free LT chains blood    Collection Time: 09/21/19  5:02 AM   Result Value Ref Range    Ig Kappa Free Light Chain 41 1 (H) 3 3 - 19 4 mg/L    Ig Lambda Free Light Chain 35 4 (H) 5 7 - 26 3 mg/L    Kappa/Lambda FluidC Ratio 1 16 0 26 - 1 65   Hemoglobin Electrophoresis    Collection Time: 09/21/19  5:02 AM   Result Value Ref Range    Hgb A2 Quant 2 0 1 8 - 3 2 %    Hgb C Quant 0 0 0 0 %    Hgb F Quant 0 0 0 0 - 2 0 %    Hgb S Quant 0 0 0 0 %    Hgb Variant 0 0 0 0 %    Hemoglobin Solubility Negative Negative    Hgb Interp   Comment     Hgb A 98 0 96 4 - 98 8 %   Fingerstick Glucose (POCT)    Collection Time: 09/21/19  5:29 AM   Result Value Ref Range    POC Glucose 178 (H) 65 - 140 mg/dl   Direct antiglobulin test    Collection Time: 09/21/19  5:42 AM   Result Value Ref Range    DIRECT JETT Negative    Fingerstick Glucose (POCT)    Collection Time: 09/21/19 11:27 AM   Result Value Ref Range    POC Glucose 200 (H) 65 - 140 mg/dl   Occult blood 1-3, stool    Collection Time: 09/21/19  1:31 PM   Result Value Ref Range    Fecal Occult Blood Diagnostic Negative Negative    Fecal Occult Blood Diagnostic 2      Fecal Occult Blood Diagnostic 3     Fingerstick Glucose (POCT)    Collection Time: 09/21/19  4:00 PM   Result Value Ref Range    POC Glucose 225 (H) 65 - 140 mg/dl   Occult blood 1-3, stool    Collection Time: 09/21/19  6:15 PM   Result Value Ref Range    Fecal Occult Blood Diagnostic Negative Negative    Fecal Occult Blood Diagnostic 2      Fecal Occult Blood Diagnostic 3     Fingerstick Glucose (POCT)    Collection Time: 09/21/19  8:18 PM   Result Value Ref Range    POC Glucose 310 (H) 65 - 140 mg/dl   CK (with reflex to MB)    Collection Time: 09/22/19  4:36 AM   Result Value Ref Range    Total CK 36 30 - 135 U/L   CBC and differential    Collection Time: 09/22/19  4:36 AM   Result Value Ref Range    WBC 13 00 (H) 4 50 - 11 00 Thousand/uL    RBC 3 54 (L) 4 00 - 5 20 Million/uL    Hemoglobin 8 4 (L) 12 0 - 16 0 g/dL    Hematocrit 26 8 (L) 36 0 - 46 0 %    MCV 76 (L) 80 - 100 fL    MCH 23 7 (L) 26 0 - 34 0 pg    MCHC 31 2 31 0 - 36 0 g/dL    RDW 15 6 (H) <15 3 %    MPV 7 7 (L) 8 9 - 12 7 fL    Platelets 203 (H) 807 - 450 Thousands/uL    Neutrophils Relative 73 (H) 45 - 65 %    Lymphocytes Relative 19 (L) 25 - 45 %    Monocytes Relative 5 1 - 10 %    Eosinophils Relative 3 0 - 6 %    Basophils Relative 1 0 - 1 %    Neutrophils Absolute 9 50 (H) 1 80 - 7 80 Thousands/µL    Lymphocytes Absolute 2 40 0 50 - 4 00 Thousands/µL    Monocytes Absolute 0 60 0 20 - 0 90 Thousand/µL    Eosinophils Absolute 0 40 0 00 - 0 40 Thousand/µL    Basophils Absolute 0 10 0 00 - 0 10 Thousands/µL   Comprehensive metabolic panel    Collection Time: 09/22/19  4:36 AM   Result Value Ref Range    Sodium 137 137 - 147 mmol/L    Potassium 4 3 3 6 - 5 0 mmol/L    Chloride 101 97 - 108 mmol/L    CO2 29 22 - 30 mmol/L    ANION GAP 7 5 - 14 mmol/L    BUN 14 5 - 25 mg/dL    Creatinine 0 70 0 60 - 1 20 mg/dL    Glucose 217 (H) 70 - 99 mg/dL    Calcium 9 2 8 4 - 10 2 mg/dL    AST 24 14 - 36 U/L    ALT 31 9 - 52 U/L    Alkaline Phosphatase 235 (H) 43 - 122 U/L    Total Protein 6 9 5 9 - 8 4 g/dL    Albumin 3 3 3 0 - 5 2 g/dL    Total Bilirubin 0 50 <1 30 mg/dL    eGFR 109 >60 ml/min/1 73sq m   Smear Review(Phlebs Do Not Order)    Collection Time: 09/22/19  4:36 AM   Result Value Ref Range    RBC Morphology abnormal     Anisocytosis Present     Hypochromia Present     Platelet Estimate Increased (A) Adequate   Fingerstick Glucose (POCT)    Collection Time: 09/22/19  5:48 AM   Result Value Ref Range    POC Glucose 228 (H) 65 - 140 mg/dl   Fingerstick Glucose (POCT)    Collection Time: 09/22/19 11:25 AM   Result Value Ref Range    POC Glucose 214 (H) 65 - 140 mg/dl   Fingerstick Glucose (POCT)    Collection Time: 09/22/19  3:52 PM   Result Value Ref Range    POC Glucose 187 (H) 65 - 140 mg/dl   Fingerstick Glucose (POCT)    Collection Time: 09/22/19  8:21 PM   Result Value Ref Range    POC Glucose 179 (H) 65 - 140 mg/dl   CBC and differential    Collection Time: 09/23/19  5:44 AM   Result Value Ref Range    WBC 12 90 (H) 4 50 - 11 00 Thousand/uL    RBC 3 56 (L) 4 00 - 5 20 Million/uL    Hemoglobin 8 5 (L) 12 0 - 16 0 g/dL    Hematocrit 27 2 (L) 36 0 - 46 0 %    MCV 77 (L) 80 - 100 fL    MCH 24 0 (L) 26 0 - 34 0 pg    MCHC 31 3 31 0 - 36 0 g/dL    RDW 15 6 (H) <15 3 %    MPV 7 9 (L) 8 9 - 12 7 fL    Platelets 667 (H) 302 - 450 Thousands/uL    Neutrophils Relative 73 (H) 45 - 65 %    Lymphocytes Relative 19 (L) 25 - 45 %    Monocytes Relative 4 1 - 10 %    Eosinophils Relative 3 0 - 6 %    Basophils Relative 1 0 - 1 %    Neutrophils Absolute 9 50 (H) 1 80 - 7 80 Thousands/µL    Lymphocytes Absolute 2 50 0 50 - 4 00 Thousands/µL    Monocytes Absolute 0 50 0 20 - 0 90 Thousand/µL    Eosinophils Absolute 0 30 0 00 - 0 40 Thousand/µL    Basophils Absolute 0 20 (H) 0 00 - 0 10 Thousands/µL   Comprehensive metabolic panel    Collection Time: 09/23/19  5:44 AM   Result Value Ref Range    Sodium 140 137 - 147 mmol/L    Potassium 4 1 3 6 - 5 0 mmol/L    Chloride 102 97 - 108 mmol/L    CO2 30 22 - 30 mmol/L    ANION GAP 8 5 - 14 mmol/L    BUN 15 5 - 25 mg/dL    Creatinine 0 77 0 60 - 1 20 mg/dL    Glucose 181 (H) 70 - 99 mg/dL    Calcium 9 2 8 4 - 10 2 mg/dL    AST 26 14 - 36 U/L    ALT 26 9 - 52 U/L    Alkaline Phosphatase 264 (H) 43 - 122 U/L    Total Protein 7 3 5 9 - 8 4 g/dL    Albumin 3 3 3 0 - 5 2 g/dL    Total Bilirubin 0 40 <1 30 mg/dL    eGFR 97 >60 ml/min/1 73sq m   Smear Review(Phlebs Do Not Order)    Collection Time: 09/23/19  5:44 AM   Result Value Ref Range    RBC Morphology abnormal     Hypochromia Present     Microcytes Present     Platelet Estimate Increased (A) Adequate   Fingerstick Glucose (POCT)    Collection Time: 09/23/19  6:00 AM   Result Value Ref Range    POC Glucose 190 (H) 65 - 140 mg/dl   Fingerstick Glucose (POCT)    Collection Time: 09/23/19 11:22 AM   Result Value Ref Range    POC Glucose 204 (H) 65 - 140 mg/dl   Fingerstick Glucose (POCT)    Collection Time: 09/23/19  3:42 PM   Result Value Ref Range    POC Glucose 205 (H) 65 - 140 mg/dl   Fingerstick Glucose (POCT)    Collection Time: 09/23/19  9:02 PM   Result Value Ref Range    POC Glucose 137 65 - 140 mg/dl   Fingerstick Glucose (POCT)    Collection Time: 09/24/19  6:32 AM   Result Value Ref Range    POC Glucose 181 (H) 65 - 140 mg/dl   Fingerstick Glucose (POCT)    Collection Time: 09/24/19 11:40 AM   Result Value Ref Range    POC Glucose 185 (H) 65 - 140 mg/dl   Fingerstick Glucose (POCT)    Collection Time: 09/24/19  4:30 PM   Result Value Ref Range    POC Glucose 246 (H) 65 - 140 mg/dl   Fingerstick Glucose (POCT)    Collection Time: 09/24/19  8:38 PM   Result Value Ref Range    POC Glucose 191 (H) 65 - 140 mg/dl   Fingerstick Glucose (POCT)    Collection Time: 09/25/19  6:02 AM   Result Value Ref Range    POC Glucose 128 65 - 140 mg/dl   Fingerstick Glucose (POCT)    Collection Time: 09/25/19 11:54 AM   Result Value Ref Range    POC Glucose 132 65 - 140 mg/dl   Fingerstick Glucose (POCT)    Collection Time: 09/25/19  5:45 PM   Result Value Ref Range    POC Glucose 255 (H) 65 - 140 mg/dl   Fingerstick Glucose (POCT)    Collection Time: 09/25/19  9:24 PM   Result Value Ref Range    POC Glucose 140 65 - 140 mg/dl   Fingerstick Glucose (POCT)    Collection Time: 09/26/19  6:15 AM   Result Value Ref Range    POC Glucose 77 65 - 140 mg/dl   Fingerstick Glucose (POCT)    Collection Time: 09/26/19 11:49 AM   Result Value Ref Range    POC Glucose 242 (H) 65 - 140 mg/dl   Fingerstick Glucose (POCT)    Collection Time: 09/26/19  4:53 PM   Result Value Ref Range    POC Glucose 88 65 - 140 mg/dl   Fingerstick Glucose (POCT)    Collection Time: 09/26/19  9:21 PM   Result Value Ref Range    POC Glucose 218 (H) 65 - 140 mg/dl   Fingerstick Glucose (POCT)    Collection Time: 09/27/19  6:05 AM   Result Value Ref Range    POC Glucose 156 (H) 65 - 140 mg/dl   Fingerstick Glucose (POCT)    Collection Time: 09/27/19 11:53 AM   Result Value Ref Range    POC Glucose 92 65 - 140 mg/dl Fingerstick Glucose (POCT)    Collection Time: 09/27/19  4:59 PM   Result Value Ref Range    POC Glucose 218 (H) 65 - 140 mg/dl   Fingerstick Glucose (POCT)    Collection Time: 09/27/19  9:35 PM   Result Value Ref Range    POC Glucose 144 (H) 65 - 140 mg/dl   Fingerstick Glucose (POCT)    Collection Time: 09/28/19  6:24 AM   Result Value Ref Range    POC Glucose 103 65 - 140 mg/dl   Fingerstick Glucose (POCT)    Collection Time: 09/28/19 11:49 AM   Result Value Ref Range    POC Glucose 221 (H) 65 - 140 mg/dl   Fingerstick Glucose (POCT)    Collection Time: 09/28/19  4:24 PM   Result Value Ref Range    POC Glucose 133 65 - 140 mg/dl   Fingerstick Glucose (POCT)    Collection Time: 09/28/19  8:31 PM   Result Value Ref Range    POC Glucose 199 (H) 65 - 140 mg/dl   Fingerstick Glucose (POCT)    Collection Time: 09/29/19  6:09 AM   Result Value Ref Range    POC Glucose 128 65 - 140 mg/dl   Fingerstick Glucose (POCT)    Collection Time: 09/29/19 12:12 PM   Result Value Ref Range    POC Glucose 115 65 - 140 mg/dl   Fingerstick Glucose (POCT)    Collection Time: 09/29/19  4:36 PM   Result Value Ref Range    POC Glucose 114 65 - 140 mg/dl   Fingerstick Glucose (POCT)    Collection Time: 09/29/19  8:32 PM   Result Value Ref Range    POC Glucose 104 65 - 140 mg/dl   Sedimentation rate, automated    Collection Time: 09/30/19  4:54 AM   Result Value Ref Range    Sed Rate 121 (H) 1 - 20 mm/hour   C-reactive protein    Collection Time: 09/30/19  4:54 AM   Result Value Ref Range    CRP 23 2 (H) <10 0 mg/L   Fingerstick Glucose (POCT)    Collection Time: 09/30/19  6:04 AM   Result Value Ref Range    POC Glucose 98 65 - 140 mg/dl   Fingerstick Glucose (POCT)    Collection Time: 09/30/19 11:56 AM   Result Value Ref Range    POC Glucose 112 65 - 140 mg/dl   ]    Procedure: Cta Head And Neck W Wo Contrast    Result Date: 9/20/2019  Narrative: CTA NECK AND BRAIN WITH AND WITHOUT CONTRAST INDICATION: Neuro deficit(s), subacute Vertigo, persistent, central COMPARISON:   Head CT 9/9/2019 TECHNIQUE:  Routine CT imaging of the Brain without contrast   Post contrast imaging was performed after administration of iodinated contrast through the neck and brain  Post contrast axial 0 625 mm images timed to opacify the arterial system  3D rendering was performed on an independent workstation  MIP reconstructions performed  Coronal reconstructions were performed of the noncontrast portion of the brain  Radiation dose length product (DLP) for this visit:  5068 mGy-cm   This examination, like all CT scans performed in the [facility], was performed utilizing techniques to minimize radiation dose exposure, including the use of iterative reconstruction and automated exposure control  IV Contrast:  100 mL of iohexol (OMNIPAQUE)  IMAGE QUALITY:   Diagnostic FINDINGS: NONCONTRAST BRAIN PARENCHYMA: No acute intracranial hemorrhage  No masslike lesion, mass effect effect or midline shift  No CT evidence for acute infarct  Cerebral atrophy more than expected for patient's age  VENTRICLES AND EXTRA-AXIAL SPACES:  Normal for the patient's age  VISUALIZED ORBITS AND PARANASAL SINUSES:  Unremarkable  CERVICAL VASCULATURE CT angiogram images are somewhat degraded by technique  AORTIC ARCH AND GREAT VESSELS: Aortic arch and great vessel origins are unremarkable  RIGHT VERTEBRAL ARTERY CERVICAL SEGMENT:The vessel origin is unremarkable  The vessel is patent throughout the neck without significant stenosis  LEFT VERTEBRAL ARTERY CERVICAL SEGMENT: The vessel origin is unremarkable  The vessel is patent throughout the neck without significant stenosis  RIGHT EXTRACRANIAL CAROTID SEGMENT: The carotid bifurcation and cervical internal carotid artery are unremarkable    No stenosis or dissection  LEFT EXTRACRANIAL CAROTID SEGMENT: The carotid bifurcation and cervical internal carotid artery are unremarkable    No stenosis or dissection   NASCET criteria was used to determine the degree of internal carotid artery diameter stenosis  INTRACRANIAL VASCULATURE INTERNAL CAROTID ARTERIES: There is mild atherosclerotic disease of cavernous and supraclinoid internal carotid arteries without significant stenosis  Normal ophthalmic artery origins  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 (series 6 images [id number]) ANTERIOR CIRCULATION:  Absent/hypoplastic right A1 segment  Right A2 segment is perfused through anterior communicating artery  Normal left A1 segment  Bilateral anterior cerebral arteries are unremarkable  MIDDLE CEREBRAL ARTERY CIRCULATION:  Bilateral M1 segments and major M2 branches are patent without critical stenosis  DISTAL VERTEBRAL ARTERIES:  Distal vertebral arteries are unremarkable  Normal right PICA origin  Suggestion of common trunk left AICA/PICA BASILAR ARTERY:  Basilar artery is normal in caliber  Normal superior cerebellar arteries  POSTERIOR CEREBRAL ARTERIES: Bilateral posterior cerebral arteries are unremarkable without critical stenosis  Hypoplastic bilateral posterior communicating arteries DURAL VENOUS SINUSES:  Unremarkable  NON VASCULAR ANATOMY BONY STRUCTURES: No acute or aggressive appearing osseous abnormality  SOFT TISSUES OF THE NECK:  Unremarkable  THORACIC INLET:  Unremarkable  Impression: 1  No acute intracranial CT abnormality  Cerebral atrophy more than expected for patient's age  2   Patent major intracranial vasculature without critical stenosis  3   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  The flow void in T2 sequence in same date MRI is not as pronounced as seen in CTA  There might be some venous component  Therefore MR angiogram is recommended for further evaluation  4   Unremarkable CT angiogram of the neck   The study was marked in EPIC for significant notification  Workstation performed: [id number]     Procedure: Xr Chest 1 View Portable    Result Date: 9/10/2019  Narrative: CHEST INDICATION:   shoulder pain  COMPARISON:  8/4/2018 EXAM PERFORMED/VIEWS:  XR CHEST PORTABLE FINDINGS: Cardiomediastinal silhouette appears unremarkable  The lungs are clear  No pneumothorax or pleural effusion  Osseous structures appear within normal limits for patient age  Impression: No acute cardiopulmonary disease  Workstation performed: [id number]     Procedure: Xr Forearm 2 Views Right    Result Date: 9/16/2019  Narrative: RIGHT FOREARM INDICATION:   pain and swelling  COMPARISON:  None VIEWS:  XR FOREARM 2 VW RIGHT FINDINGS: There is no acute fracture or dislocation  There are degenerative changes of the elbow and wrist  No lytic or blastic lesions are seen  Soft tissues are unremarkable  Impression: No acute osseous abnormality  Workstation performed: [id number]     Procedure: Xr Hand 3+ Views Right    Result Date: 9/16/2019  Narrative: RIGHT HAND INDICATION:   pain, swelling  COMPARISON:  None VIEWS:  XR HAND 3+ VW RIGHT For the purposes of institution wide universal language the following terms will apply: (thumb=1st digit/finger, index finger=2nd digit/finger, long finger=3rd digit/finger, ring=4th digit/finger and small finger=5th digit/finger) FINDINGS: There is no acute fracture or dislocation  Degenerative changes of 1st carpometacarpal joint, radiocarpal joint and scattered throughout the DIP and PIP joints  No lytic or blastic lesions are seen  There is dorsal soft tissue swelling overlying the hand  Impression: No acute osseous abnormality  Dorsal soft tissue swelling  Degenerative changes as described  Workstation performed: [id number]     Procedure: Ct Head Without Contrast    Result Date: 9/9/2019  Narrative: CT BRAIN - WITHOUT CONTRAST INDICATION:   Headache, acute, norm neuro exam  COMPARISON:  None   TECHNIQUE:  CT examination of the brain was performed  In addition to axial images, coronal 2D reformatted images were created and submitted for interpretation  Radiation dose length product (DLP) for this visit:  1065 mGy-cm   This examination, like all CT scans performed in the [facility], was performed utilizing techniques to minimize radiation dose exposure, including the use of iterative reconstruction and automated exposure control  IMAGE QUALITY:  Diagnostic  FINDINGS: PARENCHYMA:  No intracranial mass, mass effect or midline shift  No CT signs of acute infarction  No acute parenchymal hemorrhage  There is mild periventricular white matter low attenuation which is nonspecific and most likely related to chronic small vessel ischemic changes  VENTRICLES AND EXTRA-AXIAL SPACES:  There is prominence of the ventricles and sulci related to mild volume loss  VISUALIZED ORBITS AND PARANASAL SINUSES:  Unremarkable  CALVARIUM AND EXTRACRANIAL SOFT TISSUES:  Normal      Impression: No acute intracranial abnormality  Mild chronic small vessel ischemic changes and volume loss  Workstation performed: [id number]     Procedure: Cta Upper Extremity Right W Wo Contrast    Result Date: 9/16/2019  Narrative: CT ANGIOGRAM OF THE RIGHT UPPER EXTREMITY, WITH AND WITHOUT IV CONTRAST INDICATION:  Swelling  Weakness  Intact pulses  COMPARISON: None  TECHNIQUE:  CT angiogram examination of the chest was performed according to standard protocol  Contrast as well as noncontrast images were obtained  This examination, like all CT scans performed in the [facility], was performed utilizing techniques to minimize radiation dose exposure, including the use of iterative reconstruction and automated exposure control  3D reconstructions were performed an independent workstation, and are supplied for review    Rad dose 2317 mGy-cm IV Contrast:  100 mL of iohexol (OMNIPAQUE)  FINDINGS: VASCULAR STRUCTURES:  The aortic arch is normal with variant branching anatomy  A common trunk supplies the right brachiocephalic artery and left common carotid artery  The right subclavian artery, axillary artery, and brachial artery are widely patent  with continuous three-vessel runoff to the hand  Edema is noted in the subcutaneous fat of the distal forearm and hand without a vascular etiology  VISUALIZED STRUCTURES OF THE CHEST: 27 x 17 mm left submandibular lymph node  Impression: Normal CTA of the right upper extremity Enlarged left submandibular lymph node Workstation performed: [id number]     Procedure: Mra Head Wo Contrast    Result Date: 9/23/2019  Narrative: MRA BRAIN WITHOUT CONTRAST INDICATION:  aneurysm COMPARISON:  CTA 9/20/2019 TECHNIQUE:  Axial 3-D time-of-flight imaging with 3-D reconstructions performed without contrast    IV Contrast:  Not administered  FINDINGS: IMAGE QUALITY:  Diagnostic  ANATOMY INTERNAL CAROTID ARTERIES:  Right petrous and cavernous carotid artery are normal   ICA termination normal, ophthalmic artery origin normal   The Ectasia of the left cavernous carotid artery  There appear to be 2 distinct aneurysms within the cavernous carotid segment, extending laterally over the course of approximately 6 mm at the base of 2 6 mm at the height  A 2nd small focus is noted extending from the undersurface of the anterior genu of the cavernous carotid approximately a 4 mm at the base in the 3 mm in height  ANTERIOR CIRCULATION:  Left A1 is dominant, the right is markedly cystic anterior communicating artery is patent and RAUL branches within the hemispheric fissure normal  MIDDLE CEREBRAL ARTERY CIRCULATION:  The M1 segment and middle cerebral artery branches demonstrate normal flow-related enhancement  DISTAL VERTEBRAL ARTERIES:  Distal vertebral arteries are patient with a normal vertebrobasilar junction  The right posterior inferior cerebellar artery origin is not included on this exam, but appears normal on CTA 9/20/2019    Probable left AICA/PICA  BASILAR ARTERY:  Normal  POSTERIOR CEREBRAL ARTERIES:  Both posterior cerebral arteries arises from the basilar tip  Both arteries demonstrate normal flow-related enhancement  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  The proximal lesion approaches 6 x 2 6 mm, distal lesion 4 x 3 mm  No intradural aneurysm  Right A1 segment markedly hypoplastic, dominant flow to the right RAUL through the anterior communicating artery  Workstation performed: [id number]     Procedure: Mri Brain Wo Contrast    Result Date: 9/20/2019  Narrative: MRI BRAIN WITHOUT CONTRAST INDICATION: History from chart: Headaches COMPARISON:   Same date CTA head and neck and head CT 9/19/2019 TECHNIQUE:  Sagittal T1, axial T2, axial FLAIR, axial T1, axial [place] and axial diffusion imaging  IMAGE QUALITY:  Diagnostic  FINDINGS: BRAIN PARENCHYMA:  There is cerebral atrophy more than expected for patient's age  There is no discrete mass, mass effect or midline shift  There is no intracranial hemorrhage  There is no evidence of acute infarction and diffusion imaging is unremarkable  Scattered periventricular and subcortical white matter T2/FLAIR hyperintense foci, primarily involving frontal lobes VENTRICLES:  The ventricles are normal in size and contour  SELLA AND PITUITARY GLAND:  Normal  ORBITS:  Normal  PARANASAL SINUSES:  Normal  VASCULATURE:  Evaluation of the major intracranial vasculature demonstrates appropriate flow voids  CALVARIUM AND SKULL BASE:  Normal  EXTRACRANIAL SOFT TISSUES:  Normal      Impression: 1  No acute ischemic disease  2   Scattered periventricular and subcortical white matter T2/FLAIR hyperintense foci, primarily involving frontal lobes, main differential considerations include mild chronic white matter microangiopathy versus sequela of chronic migraine disease   3   Cerebral atrophy, more than expected for patient's age Workstation performed: [id number]     Procedure: Us Gallbladder    Result Date: 9/17/2019  Narrative: RIGHT UPPER QUADRANT ULTRASOUND INDICATION:     ABD PAIN, FEVER, NO RECENT SURGERY elevation alk phos and llfts acute  COMPARISON:  None TECHNIQUE:   Real-time ultrasound of the right upper quadrant was performed with a curvilinear transducer with both volumetric sweeps and still imaging techniques  FINDINGS: PANCREAS:  Visualized portions of the pancreas are within normal limits  AORTA AND IVC:  Visualized portions are normal for patient age  LIVER: Size:  Moderately enlarged  The liver measures 20 cm in the midclavicular line  Contour:  Surface contour is smooth  Parenchyma:  Echogenicity and echotexture are within normal limits  No evidence of suspicious mass  Limited imaging of the main portal vein shows it to be patent and hepatopetal   BILIARY: The gallbladder is normal in caliber  No wall thickening or pericholecystic fluid  There are multiple dependent calculi without sludge  No sonographic Mcconnell sign  No intrahepatic biliary dilatation  CBD measures 6 mm  No choledocholithiasis  KIDNEY: Right kidney measures 9 8 x 4 3 cm  Within normal limits  ASCITES:   None  Impression: 1  Hepatomegaly  2   Cholelithiasis  Workstation performed: [id number]     Procedure: Xr Chest 1 View    Result Date: 9/16/2019  Narrative: CHEST INDICATION:   cough  COMPARISON:  Chest radiograph 9/9/2019 EXAM PERFORMED/VIEWS:  XR CHEST 1 VIEW FINDINGS: Cardiomediastinal silhouette appears unremarkable  The lungs are clear  No pneumothorax or pleural effusion  Osseous structures appear within normal limits for patient age  Impression: No acute cardiopulmonary disease  Workstation performed: [id number]     Procedure: Ct Chest Abdomen Pelvis W Contrast    Result Date: 9/21/2019  Narrative: CT CHEST, ABDOMEN AND PELVIS WITH IV CONTRAST INDICATION:   Evaluate patient for malignancy- elevated alk phos, fatigue, unexplained swelling BUE  COMPARISON:  CT chest dated August 4, 2018  TECHNIQUE: CT examination of the chest, abdomen and pelvis was performed  In addition to portal venous phase postcontrast scanning through the abdomen and pelvis, delayed phase postcontrast scanning was performed through the upper abdominal viscera  Axial, sagittal, and coronal 2D reformatted images were created from the source data and submitted for interpretation  Radiation dose length product (DLP) for this visit:  1866 mGy-cm   This examination, like all CT scans performed in the [address] [place] [address], was performed utilizing techniques to minimize radiation dose exposure, including the use of iterative reconstruction and automated exposure control  IV Contrast:  100 mL of iohexol (OMNIPAQUE) Enteric Contrast: Enteric contrast was administered  FINDINGS: CHEST LUNGS:  The central airways are patent  Mild bibasilar atelectasis  No suspicious mass or focal consolidation  There car stable pulmonary nodules including: -Stable 6 mm groundglass nodule in the right middle lobe (series 3 image 71) -stable 4 mm right upper lobe subpleural nodule (series 3 image 74) -stable 3 mm left upper lobe pleural-based nodule (series 3 image 64)  PLEURA:  Mild pleural thickening along the left lower lobe  No pleural effusion or pneumothorax  HEART/GREAT VESSELS:  Normal cardiac size  No pericardial effusion  Stable enlargement of the ascending thoracic aorta measuring 4 2 cm  Stable mild enlargement of the main pulmonary artery measuring 3 7 cm, a finding which may be seen in the setting of pulmonary hypertension  MEDIASTINUM AND CRISTAL:  No mediastinal lymphadenopathy  Small hiatal hernia  CHEST WALL AND LOWER NECK:   Stable scattered subcentimeter bilateral axillary lymph nodes with normal lymph node morphology and fatty hilum  No enlarged lymph nodes by imaging size criteria  ABDOMEN LIVER/BILIARY TREE:  Unremarkable  GALLBLADDER:  Multiple gallstones  No pericholecystic inflammatory changes   SPLEEN: Unremarkable  PANCREAS:  Unremarkable  ADRENAL GLANDS:  Unremarkable  KIDNEYS/URETERS:  No hydroureteronephrosis  Small hypoattenuating lesions bilaterally, not adequately characterized, but likely cysts  STOMACH AND BOWEL:  There are scattered colonic diverticuli without evidence of diverticulitis  Moderate volume retained stool in the colon  No bowel obstruction  APPENDIX:  No findings to suggest appendicitis  ABDOMINOPELVIC CAVITY:  No ascites or free intraperitoneal air  No lymphadenopathy  VESSELS:  Unremarkable for patient's age  PELVIS REPRODUCTIVE ORGANS:  Status post hysterectomy  No suspicious adnexal mass  URINARY BLADDER:  Unremarkable  ABDOMINAL WALL/INGUINAL REGIONS:  Small fat-containing umbilical hernia  Induration of the subcutaneous fat in the anterior abdominal wall with small flecks of air, likely related to medication injection sites  OSSEOUS STRUCTURES:  No acute fracture or destructive osseous lesion  Degenerative changes of the spine  Mild degenerative arthropathy in the hips, sacroiliac joints and symphysis pubis   Impression: 1  No acute findings in the chest, abdomen or pelvis  2   Stable pulmonary nodules measuring up to 6 mm  Follow-up chest CT is recommended in 12 months to document continued stability  3  Stable enlargement of the ascending thoracic aorta measuring 4 2 cm and is stable enlargement of the main pulmonary artery measuring 3 7 cm  4   Cholelithiasis without findings of acute cholecystitis  5   Small hiatal hernia  The study is marked on Epic for follow-up evaluation  Workstation performed: [id number]     Procedure: Vas Upper Limb Venous Duplex Scan, Unilateral/limited    Result Date: 9/16/2019  Narrative:  [facility] REPORT CLINICAL: Indications: Patient presents with upper lower extremity pain and swelling x 5 days   Operative History: Denies Any Cardiovascular Procedures Risk Factors The patient has history of Obesity, HTN, Sickle Cell Trait, Diabetes (Yes) and HLD  FINDINGS:  Segment       Right            Left                        Impression       Impression       Int  Jugular  Normal (Patent)  Normal (Patent)  Bas Upper     Normal                            Ceph Upper    Normal                               CONCLUSION:  Impression RIGHT UPPER LIMB: No evidence of acute or chronic deep vein thrombosis  No evidence of superficial thrombophlebitis noted  Doppler evaluation shows a normal response to augmentation maneuvers  LEFT UPPER LIMB LIMITED: Evaluation shows no evidence of thrombus in the internal jugular vein, subclavian vein, and the brachiocephalic vein  No previous study available for comparison  Technical findings shared with Dr Belinda Grady and faxed to chart    SIGNATURE: Electronically Signed by: Glory Jaquez on 2019-09-16 07:23:21 PM

## 2019-10-08 NOTE — H&P (VIEW-ONLY)
TCM Call (since 9/7/2019)     Date and time call was made  9/25/2019  4:17 PM    Hospital care reviewed  Discussed with Inpatient Physician    Patient was hospitialized at  [facility]    Date of Admission  09/16/19    Date of discharge  09/23/19    Diagnosis  r upper extremety swelling and pain     Disposition  Nursing Home      TCM Call (since 9/7/2019)     None        Assessment/Plan:  Some possible concern for temporal arteritis versus polymyalgia rheumatica  We discussed that the treatment options for both of these would involve systemic steroids, she has uncontrolled diabetes  Dietary restrictions have not been very good in the past   Recent sed rate and CRP still continued to be elevated  Hemoglobin was around 8, possibly anemia of chronic disease but even before her acute symptoms, her hemoglobin was around 10  We had discussed in the past for the need of colonoscopy  Stool testing during the hospital stay were with the normal limits and negative for occult blood  Advised her to continue close monitoring of blood sugars, call the office if any concerns with hyper or hyperlipidemia  Blood pressure seems well controlled  Advised to reschedule her appointment with Neurology  She will be undergoing physical and occupational therapy as an outpatient  Influenza vaccine was already completed during her hospital stay  Diagnoses and all orders for this visit:    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, with long-term current use of insulin (HCC)  -     insulin lispro (HUMALOG KWIKPEN) 100 units/mL injection pen; Inject 15 Units under the skin 3 (three) times a day with meals 35 units before meals or 40 if BG over 200  -     BASAGLAR KWIKPEN 100 units/mL injection pen;  Inject 30 Units under the skin every 12 (twelve) hours          Subjective:   Chief Complaint   Patient presents with    Transition of Care Management     [place] 09/30/19 [facility]         Patient ID: Michael Coombs is a 62 y o  female  She comes in for follow-up after hospitalization, she was initially admitted for my headaches which were evaluated by Neurology, imaging of the central nervous system did not show any acute abnormalities  He has was considered to be migraine headaches  Then she was recently for pain and swelling in her upper extremities  She was evaluated by vascular surgery, upper extremity imaging did not show any signs concerns for DVT or compartment syndrome  She had initially been treated with antibiotics but this was later found out that was not an infectious process  She had some testing for sed rate and CRP and these were found to be elevated  She will be seeing a rheumatologist tomorrow  She notes that her fasting blood sugars are generally less than 130 and occasional post prandials are 150  She is taking basaglar 30 units twice a day and Humalog 15-14 units 3 times a day  She is trying to eat better, she still struggles with being able to prepare her meals  Lack of social support  Headaches continue with occasional dizziness  The following portions of the patient's history were reviewed and updated as appropriate: current medications, past medical history, past social history and past surgical history      PHQ-9 Depression Screening    PHQ-9:    Frequency of the following problems over the past two weeks:                Current Outpatient Medications:     albuterol (PROVENTIL HFA) 90 mcg/act inhaler, Inhale 2 puffs every 6 (six) hours as needed for wheezing For another 24 hours use every 4 hours standing, Disp: 6 7 g, Rfl: 0    aspirin 81 MG tablet, Take 1 tablet by mouth daily, Disp: , Rfl:     atorvastatin (LIPITOR) 40 mg tablet, Take 1 tablet (40 mg total) by mouth daily, Disp: 90 tablet, Rfl: 1    BASAGLAR KWIKPEN 100 units/mL injection pen, Inject 30 Units under the skin every 12 (twelve) hours, Disp: 5 pen, Rfl: 0    SARAH MICROLET LANCETS lancets, Inject 1 applicator into the skin 4 (four) times a day, Disp: , Rfl:     Blood Glucose Monitoring Suppl (CONTOUR NEXT MONITOR) w/Device KIT, Disp 1 meter dx E11 9, may submitted any contour next meter  If not covered let us know we can change strips, Disp: 1 kit, Rfl: 1    Blood Pressure Monitor KIT, Check blood pressures BID, Disp: 1 each, Rfl: 0    budesonide-formoterol (SYMBICORT) 80-4 5 MCG/ACT inhaler, Inhale 2 puffs 2 (two) times a day Rinse mouth after use  (Patient taking differently: Inhale 2 puffs as needed Rinse mouth after use ), Disp: 1 Inhaler, Rfl: 5    Cholecalciferol (VITAMIN D3) 3000 units TABS, Take by mouth daily , Disp: , Rfl:     CONTOUR NEXT TEST test strip, Test blood sugar 3x per day dx E11 9, Disp: 300 each, Rfl: 1    diltiazem (TIAZAC) 420 MG 24 hr capsule, Take 1 capsule (420 mg total) by mouth daily, Disp: 30 capsule, Rfl: 5    gabapentin (NEURONTIN) 100 mg capsule, Take 1 capsule (100 mg total) by mouth 2 (two) times a day, Disp: 180 capsule, Rfl: 1    insulin lispro (HUMALOG KWIKPEN) 100 units/mL injection pen, Inject 15 Units under the skin 3 (three) times a day with meals 35 units before meals or 40 if BG over 200, Disp: 5 pen, Rfl: 0    Insulin Pen Needle (BD PEN NEEDLE DERRICK U/F) 32G X 4 MM MISC, Inject 1 applicator under the skin 4 (four) times a day, Disp: , Rfl:     losartan (COZAAR) 100 MG tablet, Take 1 tablet (100 mg total) by mouth daily, Disp: 30 tablet, Rfl: 5    Review of Systems   Constitutional: Positive for fatigue  Negative for fever and unexpected weight change  HENT: Negative for ear pain, hearing loss and sore throat  Eyes: Negative for pain and discharge  Respiratory: Negative for cough, chest tightness and shortness of breath  Cardiovascular: Negative for chest pain and palpitations  Gastrointestinal: Negative for abdominal pain, blood in stool, constipation, diarrhea and nausea  Genitourinary: Negative for dysuria, frequency and hematuria  Musculoskeletal: Positive for arthralgias and back pain  Negative for joint swelling  Skin: Negative for rash  Allergic/Immunologic: Negative for immunocompromised state  Neurological: Positive for dizziness and headaches  Hematological: Negative for adenopathy  Psychiatric/Behavioral: Negative for confusion and sleep disturbance  Objective:  /80 (BP Location: Left arm, Patient Position: Sitting, Cuff Size: Standard)   Pulse 90   Temp 98 8 °F (37 1 °C) (Tympanic)   Ht 5' 8" (1 727 m)   Wt (!) 138 kg (304 lb 6 4 oz)   SpO2 98%   BMI 46 28 kg/m²      Physical Exam   Constitutional: She appears well-developed and well-nourished  She appears ill  HENT:   Head: Normocephalic and atraumatic  Right Ear: Tympanic membrane normal    Left Ear: Tympanic membrane normal    Nose: Nose normal    Mouth/Throat: Oropharynx is clear and moist  No posterior oropharyngeal edema or posterior oropharyngeal erythema  Eyes: Pupils are equal, round, and reactive to light  Conjunctivae are normal  Right eye exhibits no discharge  Left eye exhibits no discharge  Neck: Normal range of motion  Neck supple  No thyromegaly present  Cardiovascular: Normal rate, regular rhythm, S1 normal, S2 normal and normal heart sounds  PMI is not displaced  No murmur heard  Pulmonary/Chest: Effort normal and breath sounds normal  No accessory muscle usage  No apnea  No respiratory distress  She has no rhonchi  She has no rales  Abdominal: Soft  Normal appearance and bowel sounds are normal  She exhibits no shifting dullness  There is no hepatosplenomegaly  There is no tenderness  There is no rebound and no CVA tenderness  Musculoskeletal: Normal range of motion  She exhibits no edema or tenderness  Lymphadenopathy:     She has no cervical adenopathy  Neurological: She is alert  Skin: Skin is warm and intact  No rash noted  Psychiatric: She has a normal mood and affect   Her speech is normal    Nursing note and vitals reviewed          Recent Results (from the past 1008 hour(s))   CBC and differential    Collection Time: 09/09/19  6:16 PM   Result Value Ref Range    WBC 14 00 (H) 4 50 - 11 00 Thousand/uL    RBC 4 17 4 00 - 5 20 Million/uL    Hemoglobin 10 4 (L) 12 0 - 16 0 g/dL    Hematocrit 32 0 (L) 36 0 - 46 0 %    MCV 77 (L) 80 - 100 fL    MCH 24 8 (L) 26 0 - 34 0 pg    MCHC 32 3 31 0 - 36 0 g/dL    RDW 15 7 (H) <15 3 %    MPV 8 2 (L) 8 9 - 12 7 fL    Platelets 593 901 - 688 Thousands/uL    Neutrophils Relative 78 (H) 45 - 65 %    Lymphocytes Relative 14 (L) 25 - 45 %    Monocytes Relative 6 1 - 10 %    Eosinophils Relative 2 0 - 6 %    Basophils Relative 1 0 - 1 %    Neutrophils Absolute 10 90 (H) 1 80 - 7 80 Thousands/µL    Lymphocytes Absolute 1 90 0 50 - 4 00 Thousands/µL    Monocytes Absolute 0 80 0 20 - 0 90 Thousand/µL    Eosinophils Absolute 0 20 0 00 - 0 40 Thousand/µL    Basophils Absolute 0 10 0 00 - 0 10 Thousands/µL   Protime-INR    Collection Time: 09/09/19  6:16 PM   Result Value Ref Range    Protime 10 0 9 8 - 12 0 seconds    INR 0 91 0 91 - 1 09   APTT    Collection Time: 09/09/19  6:16 PM   Result Value Ref Range    PTT 33 (H) 25 - 32 seconds   Troponin I    Collection Time: 09/09/19  6:16 PM   Result Value Ref Range    Troponin I <0 01 0 00 - 0 03 ng/mL   Smear Review(Phlebs Do Not Order)    Collection Time: 09/09/19  6:16 PM   Result Value Ref Range    RBC Morphology Present     Anisocytosis Present     Hypochromia Present     Microcytes Present     Poikilocytes Present     Platelet Estimate Adequate Adequate   ECG 12 lead    Collection Time: 09/09/19  6:51 PM   Result Value Ref Range    Ventricular Rate 99 BPM    Atrial Rate 99 BPM    NE Interval 168 ms    QRSD Interval 94 ms    QT Interval 386 ms    QTC Interval 495 ms    P Axis 46 degrees    QRS Axis -1 degrees    T Wave Axis 35 degrees   Comprehensive metabolic panel    Collection Time: 09/09/19  7:26 PM   Result Value Ref Range    Sodium 137 137 - 147 mmol/L    Potassium 3 7 3 6 - 5 0 mmol/L    Chloride 95 (L) 97 - 108 mmol/L    CO2 35 (H) 22 - 30 mmol/L    ANION GAP 7 5 - 14 mmol/L    BUN 26 (H) 5 - 25 mg/dL    Creatinine 1 09 0 60 - 1 20 mg/dL    Glucose 164 (H) 70 - 99 mg/dL    Calcium 9 5 8 4 - 10 2 mg/dL    AST 23 14 - 36 U/L    ALT 28 9 - 52 U/L    Alkaline Phosphatase 168 (H) 43 - 122 U/L    Total Protein 7 8 5 9 - 8 4 g/dL    Albumin 4 0 3 0 - 5 2 g/dL    Total Bilirubin 0 70 <1 30 mg/dL    eGFR 64 >60 ml/min/1 73sq m   CBC and differential    Collection Time: 09/16/19  9:45 AM   Result Value Ref Range    WBC 14 70 (H) 4 50 - 11 00 Thousand/uL    RBC 3 87 (L) 4 00 - 5 20 Million/uL    Hemoglobin 9 5 (L) 12 0 - 16 0 g/dL    Hematocrit 29 2 (L) 36 0 - 46 0 %    MCV 75 (L) 80 - 100 fL    MCH 24 6 (L) 26 0 - 34 0 pg    MCHC 32 5 31 0 - 36 0 g/dL    RDW 15 1 <15 3 %    MPV 8 7 (L) 8 9 - 12 7 fL    Platelets 894 (H) 674 - 450 Thousands/uL    Neutrophils Relative 77 (H) 45 - 65 %    Lymphocytes Relative 12 (L) 25 - 45 %    Monocytes Relative 8 1 - 10 %    Eosinophils Relative 2 0 - 6 %    Basophils Relative 2 (H) 0 - 1 %    Neutrophils Absolute 11 30 (H) 1 80 - 7 80 Thousands/µL    Lymphocytes Absolute 1 80 0 50 - 4 00 Thousands/µL    Monocytes Absolute 1 20 (H) 0 20 - 0 90 Thousand/µL    Eosinophils Absolute 0 20 0 00 - 0 40 Thousand/µL    Basophils Absolute 0 20 (H) 0 00 - 0 10 Thousands/µL   Comprehensive metabolic panel    Collection Time: 09/16/19  9:45 AM   Result Value Ref Range    Sodium 135 (L) 137 - 147 mmol/L    Potassium 4 5 3 6 - 5 0 mmol/L    Chloride 92 (L) 97 - 108 mmol/L    CO2 34 (H) 22 - 30 mmol/L    ANION GAP 9 5 - 14 mmol/L    BUN 26 (H) 5 - 25 mg/dL    Creatinine 0 84 0 60 - 1 20 mg/dL    Glucose 350 (H) 70 - 99 mg/dL    Calcium 9 0 8 4 - 10 2 mg/dL    AST 64 (H) 14 - 36 U/L    ALT 59 (H) 9 - 52 U/L    Alkaline Phosphatase 325 (H) 43 - 122 U/L    Total Protein 8 0 5 9 - 8 4 g/dL    Albumin 3 8 3 0 - 5 2 g/dL    Total Bilirubin 1 10 <1 30 mg/dL    eGFR 87 >60 ml/min/1 73sq m   Blood culture #1    Collection Time: 09/16/19  9:45 AM   Result Value Ref Range    Blood Culture No Growth After 5 Days  B-type natriuretic peptide    Collection Time: 09/16/19  9:45 AM   Result Value Ref Range    NT-proBNP 136 0 - 299 pg/mL   Troponin I    Collection Time: 09/16/19  9:45 AM   Result Value Ref Range    Troponin I <0 01 0 00 - 0 03 ng/mL   Smear Review(Phlebs Do Not Order)    Collection Time: 09/16/19  9:45 AM   Result Value Ref Range    RBC Morphology Present     Anisocytosis Present     Hypochromia Present     Microcytes Present     Platelet Estimate Increased (A) Adequate    Helmet Cells     TSH, 3rd generation    Collection Time: 09/16/19  9:45 AM   Result Value Ref Range    TSH 3RD GENERATON 1 710 0 465 - 4 680 uIU/mL   Sedimentation rate, automated    Collection Time: 09/16/19  9:45 AM   Result Value Ref Range    Sed Rate >130 (H) 1 - 20 mm/hour   Uric acid    Collection Time: 09/16/19  9:45 AM   Result Value Ref Range    Uric Acid 3 3 2 7 - 7 5 mg/dL   Iron Saturation %    Collection Time: 09/16/19  9:45 AM   Result Value Ref Range    Iron Saturation 15 %    TIBC 215 (L) 250 - 450 ug/dL    Iron 33 (L) 50 - 170 ug/dL   Ferritin    Collection Time: 09/16/19  9:45 AM   Result Value Ref Range    Ferritin 560 (H) 8 - 388 ng/mL   Blood culture #2    Collection Time: 09/16/19 10:28 AM   Result Value Ref Range    Blood Culture No Growth After 5 Days      ECG 12 lead    Collection Time: 09/16/19 10:35 AM   Result Value Ref Range    Ventricular Rate 98 BPM    Atrial Rate 98 BPM    TN Interval 172 ms    QRSD Interval 96 ms    QT Interval 396 ms    QTC Interval 505 ms    P Axis 30 degrees    QRS Axis -3 degrees    T Wave Axis 17 degrees   UA w Reflex to Microscopic w Reflex to Culture    Collection Time: 09/16/19 12:01 PM   Result Value Ref Range    Color, UA Cathi (A) Straw, Yellow, Pale Yellow    Clarity, UA Clear Clear, Other    Specific [place], UA 1 015 1 003 - 1 040    pH, UA 6 0 4 5, 5 0, 5 5, 6 0, 6 5, 7 0, 7 5, 8 0    Leukocytes, UA Negative Negative    Nitrite, UA Negative Negative    Protein, UA 30 (1+) (A) Negative mg/dl    Glucose,  (1/4%) (A) Negative mg/dl    Ketones, UA Negative Negative mg/dl    Bilirubin, UA Negative Negative    Blood, UA Negative Negative    UROBILINOGEN UA 1 0 1 0, Negative mg/dL   Urine Microscopic    Collection Time: 09/16/19 12:01 PM   Result Value Ref Range    RBC, UA None Seen None Seen, 0-5 /hpf    WBC, UA 1-2 (A) None Seen, 0-5, 5-55, 5-65 /hpf    Epithelial Cells Occasional None Seen, Occasional /hpf    Bacteria, UA Occasional None Seen, Occasional /hpf   CK    Collection Time: 09/16/19  5:13 PM   Result Value Ref Range    Total CK 55 30 - 135 U/L   Hepatitis panel, acute    Collection Time: 09/16/19  6:35 PM   Result Value Ref Range    Hepatitis B Surface Ag Non-reactive Non-reactive, NonReactive - Confirmed    Hep A IgM Non-reactive Non-reactive, Equivocal-Suggest Recollect    Hepatitis C Ab Equivocal (A) Non-reactive    Hep B C IgM Non-reactive Non-reactive   Fingerstick Glucose (POCT)    Collection Time: 09/16/19  8:22 PM   Result Value Ref Range    POC Glucose 359 (H) 65 - 140 mg/dl   Fingerstick Glucose (POCT)    Collection Time: 09/16/19 11:19 PM   Result Value Ref Range    POC Glucose 321 (H) 65 - 140 mg/dl   Fingerstick Glucose (POCT)    Collection Time: 09/17/19  3:12 AM   Result Value Ref Range    POC Glucose 307 (H) 65 - 140 mg/dl   Comprehensive metabolic panel    Collection Time: 09/17/19  5:24 AM   Result Value Ref Range    Sodium 135 (L) 137 - 147 mmol/L    Potassium 4 1 3 6 - 5 0 mmol/L    Chloride 95 (L) 97 - 108 mmol/L    CO2 33 (H) 22 - 30 mmol/L    ANION GAP 7 5 - 14 mmol/L    BUN 23 5 - 25 mg/dL    Creatinine 0 84 0 60 - 1 20 mg/dL    Glucose 312 (H) 70 - 99 mg/dL    Calcium 8 8 8 4 - 10 2 mg/dL    AST 45 (H) 14 - 36 U/L    ALT 57 (H) 9 - 52 U/L    Alkaline Phosphatase 291 (H) 43 - 122 U/L Total Protein 7 1 5 9 - 8 4 g/dL    Albumin 3 5 3 0 - 5 2 g/dL    Total Bilirubin 0 60 <1 30 mg/dL    eGFR 87 >60 ml/min/1 73sq m   CBC and differential    Collection Time: 09/17/19  5:24 AM   Result Value Ref Range    WBC 13 10 (H) 4 50 - 11 00 Thousand/uL    RBC 3 59 (L) 4 00 - 5 20 Million/uL    Hemoglobin 8 7 (L) 12 0 - 16 0 g/dL    Hematocrit 27 2 (L) 36 0 - 46 0 %    MCV 76 (L) 80 - 100 fL    MCH 24 2 (L) 26 0 - 34 0 pg    MCHC 31 9 31 0 - 36 0 g/dL    RDW 15 5 (H) <15 3 %    MPV 8 0 (L) 8 9 - 12 7 fL    Platelets 159 (H) 674 - 450 Thousands/uL    Neutrophils Relative 74 (H) 45 - 65 %    Lymphocytes Relative 14 (L) 25 - 45 %    Monocytes Relative 7 1 - 10 %    Eosinophils Relative 4 0 - 6 %    Basophils Relative 1 0 - 1 %    Neutrophils Absolute 9 70 (H) 1 80 - 7 80 Thousands/µL    Lymphocytes Absolute 1 90 0 50 - 4 00 Thousands/µL    Monocytes Absolute 0 90 0 20 - 0 90 Thousand/µL    Eosinophils Absolute 0 50 (H) 0 00 - 0 40 Thousand/µL    Basophils Absolute 0 10 0 00 - 0 10 Thousands/µL   ECG 12 lead    Collection Time: 09/17/19  5:36 AM   Result Value Ref Range    Ventricular Rate 90 BPM    Atrial Rate 90 BPM    DE Interval 160 ms    QRSD Interval 94 ms    QT Interval 398 ms    QTC Interval 487 ms    P [place] 21 degrees    QRS Axis -4 degrees    T Wave Axis 11 degrees   Fingerstick Glucose (POCT)    Collection Time: 09/17/19  5:48 AM   Result Value Ref Range    POC Glucose 291 (H) 65 - 140 mg/dl   Fingerstick Glucose (POCT)    Collection Time: 09/17/19 11:26 AM   Result Value Ref Range    POC Glucose 212 (H) 65 - 140 mg/dl   RF Screen w/ Reflex to Titer    Collection Time: 09/17/19 12:22 PM   Result Value Ref Range    Rheumatoid Factor Negative Negative   Fingerstick Glucose (POCT)    Collection Time: 09/17/19  4:38 PM   Result Value Ref Range    POC Glucose 311 (H) 65 - 140 mg/dl   Fingerstick Glucose (POCT)    Collection Time: 09/17/19  8:27 PM   Result Value Ref Range    POC Glucose 386 (H) 65 - 140 mg/dl Fingerstick Glucose (POCT)    Collection Time: 09/18/19  4:58 AM   Result Value Ref Range    POC Glucose 277 (H) 65 - 140 mg/dl   JUAN Screen w/ Reflex to Titer/Pattern    Collection Time: 09/18/19  5:39 AM   Result Value Ref Range    JUAN Positive (A) Negative   Sedimentation rate, automated    Collection Time: 09/18/19  5:39 AM   Result Value Ref Range    Sed Rate 127 (H) 0 - 20 mm/hour   JUAN Titer (Reflex test-do not order)    Collection Time: 09/18/19  5:39 AM   Result Value Ref Range    JUAN Titer 1 Titer of 80     JUAN Pattern 1 Homogeneous pattern    Fingerstick Glucose (POCT)    Collection Time: 09/18/19 11:32 AM   Result Value Ref Range    POC Glucose 265 (H) 65 - 140 mg/dl   Fingerstick Glucose (POCT)    Collection Time: 09/18/19  3:36 PM   Result Value Ref Range    POC Glucose 334 (H) 65 - 140 mg/dl   Fingerstick Glucose (POCT)    Collection Time: 09/18/19  8:06 PM   Result Value Ref Range    POC Glucose 331 (H) 65 - 140 mg/dl   CBC and differential    Collection Time: 09/19/19  5:37 AM   Result Value Ref Range    WBC 15 50 (H) 4 50 - 11 00 Thousand/uL    RBC 3 52 (L) 4 00 - 5 20 Million/uL    Hemoglobin 8 5 (L) 12 0 - 16 0 g/dL    Hematocrit 26 8 (L) 36 0 - 46 0 %    MCV 76 (L) 80 - 100 fL    MCH 24 2 (L) 26 0 - 34 0 pg    MCHC 31 8 31 0 - 36 0 g/dL    RDW 15 6 (H) <15 3 %    MPV 7 8 (L) 8 9 - 12 7 fL    Platelets 111 (H) 982 - 450 Thousands/uL   Comprehensive metabolic panel    Collection Time: 09/19/19  5:37 AM   Result Value Ref Range    Sodium 133 (L) 137 - 147 mmol/L    Potassium 4 5 3 6 - 5 0 mmol/L    Chloride 99 97 - 108 mmol/L    CO2 29 22 - 30 mmol/L    ANION GAP 5 5 - 14 mmol/L    BUN 12 5 - 25 mg/dL    Creatinine 0 69 0 60 - 1 20 mg/dL    Glucose 276 (H) 70 - 99 mg/dL    Calcium 8 6 8 4 - 10 2 mg/dL    AST 36 14 - 36 U/L    ALT 50 9 - 52 U/L    Alkaline Phosphatase 306 (H) 43 - 122 U/L    Total Protein 7 0 5 9 - 8 4 g/dL    Albumin 3 3 3 0 - 5 2 g/dL    Total Bilirubin 0 60 <1 30 mg/dL    eGFR 109 >60 ml/min/1 73sq m   C-reactive protein    Collection Time: 09/19/19  5:37 AM   Result Value Ref Range    CRP >90 0 (H) <10 0 mg/L   Manual Differential (Non Wam)    Collection Time: 09/19/19  5:37 AM   Result Value Ref Range    Segmented % 72 (H) 45 - 65 %    Lymphocytes % 16 (L) 25 - 45 %    Monocytes % 10 1 - 10 %    Eosinophils, % 2 0 - 6 %    Neutrophils Absolute 11 16 (H) 1 80 - 7 80 Thousand/uL    Lymphocytes Absolute 2 48 0 50 - 4 00 Thousand/uL    Monocytes Absolute 1 55 (H) 0 20 - 0 90 Thousand/uL    Eosinophils Absolute 0 31 0 00 - 0 40 Thousand/uL    Total Counted 100     RBC Morphology Present     Anisocytosis Present     Hypochromia Present     Microcytes Present     Platelet Estimate Increased (A) Adequate   Fingerstick Glucose (POCT)    Collection Time: 09/19/19  5:50 AM   Result Value Ref Range    POC Glucose 273 (H) 65 - 140 mg/dl   Fingerstick Glucose (POCT)    Collection Time: 09/19/19 11:02 AM   Result Value Ref Range    POC Glucose 294 (H) 65 - 555 mg/dl   Cyclic citrul peptide antibody, IgG    Collection Time: 09/19/19 12:40 PM   Result Value Ref Range    Cyclic Citrullin Peptide Ab 4 0 - 19 units   Anti-neutrophilic cytoplasmic antibody    Collection Time: 09/19/19 12:40 PM   Result Value Ref Range    C-ANCA <1:20 Neg:<1:20 titer    Atypical pANCA <1:20 Neg:<1:20 titer    MPO AB <9 0 0 0 - 9 0 U/mL    UT-3 AB <3 5 0 0 - 3 5 U/mL    P-ANCA <1:20 Neg:<1:20 titer   Lupus anticoagulant    Collection Time: 09/19/19 12:40 PM   Result Value Ref Range    PTT Lupus Anticoagulant 51 7 0 0 - 51 9 sec    Dilute Viper Venom Time 49 7 (H) 0 0 - 47 0 sec    DILUTE PROTHROMBIN TIME(DPT) 55 6 (H) 0 0 - 55 0 sec    THROMBIN TIME (DRVW) 18 6 0 0 - 23 0 sec    DPT CONFIRM RATIO 1 11 0 00 - 1 40 Ratio    LUPUS REFLEX INTERPRETATION Comment:    Cardiolipin antibody    Collection Time: 09/19/19 12:40 PM   Result Value Ref Range    Anticardiolipin IgA <9 0 - 11 APL U/mL    Anticardiolipin IgG <9 0 - 14 GPL U/mL Anticardiolipin IgM 13 (H) 0 - 12 MPL U/mL   DRVVT Mix-LA    Collection Time: 09/19/19 12:40 PM   Result Value Ref Range    dRVVT Mix Interp  42 7 0 0 - 47 0 sec   Blood culture    Collection Time: 09/19/19 12:49 PM   Result Value Ref Range    Blood Culture No Growth After 5 Days  Blood culture    Collection Time: 09/19/19 12:50 PM   Result Value Ref Range    Blood Culture No Growth After 5 Days      Lyme disease, PCR    Collection Time: 09/19/19  2:07 PM   Result Value Ref Range    Lyme Disease(B burgdorferi)PCR Negative Negative   Fingerstick Glucose (POCT)    Collection Time: 09/19/19  3:55 PM   Result Value Ref Range    POC Glucose 350 (H) 65 - 140 mg/dl   Fingerstick Glucose (POCT)    Collection Time: 09/19/19  9:53 PM   Result Value Ref Range    POC Glucose 226 (H) 65 - 140 mg/dl   Fingerstick Glucose (POCT)    Collection Time: 09/20/19  5:54 AM   Result Value Ref Range    POC Glucose 194 (H) 65 - 140 mg/dl   Fingerstick Glucose (POCT)    Collection Time: 09/20/19 10:58 AM   Result Value Ref Range    POC Glucose 270 (H) 65 - 140 mg/dl   Fingerstick Glucose (POCT)    Collection Time: 09/20/19  3:52 PM   Result Value Ref Range    POC Glucose 109 65 - 140 mg/dl   Sjogren's Antibodies    Collection Time: 09/20/19  4:13 PM   Result Value Ref Range    SS-A (RO) Ab <0 2 0 0 - 0 9 AI    SS-B (LA) Ab <0 2 0 0 - 0 9 AI   Protein electrophoresis, serum    Collection Time: 09/20/19  4:13 PM   Result Value Ref Range    A/G Ratio 0 56 (L) 1 10 - 1 80    Albumin Electrophoresis 36 0 (L) 52 0 - 65 0 %    Albumin CONC 2 41 (L) 3 50 - 5 00 g/dl    Alpha 1 7 7 (H) 2 5 - 5 0 %    ALPHA 1 CONC 0 52 (H) 0 10 - 0 40 g/dL    Alpha 2 21 0 (H) 7 0 - 13 0 %    ALPHA 2 CONC 1 41 (H) 0 40 - 1 20 g/dL    Beta-1 9 8 5 0 - 13 0 %    BETA 1 CONC 0 66 0 40 - 0 80 g/dL    Beta-2 7 6 2 0 - 8 0 %    BETA 2 CONC 0 51 (H) 0 20 - 0 50 g/dL    Gamma Globulin 17 9 12 0 - 22 0 %    GAMMA CONC 1 20 0 50 - 1 60 g/dL    Total Protein 6 7 6 4 - 8 2 g/dL    SPEP Interpretation       No monoclonal bands noted   Reviewed by: Gabi Cruz MD **Electronic Signature**   Complement, total    Collection Time: 09/20/19  4:13 PM   Result Value Ref Range    Compl, Total (CH50) >60 42 - [id number] U/mL   CEA    Collection Time: 09/20/19  4:13 PM   Result Value Ref Range    CEA 1 0 0 0 - 3 0 ng/mL   Procalcitonin    Collection Time: 09/20/19  4:13 PM   Result Value Ref Range    Procalcitonin 0 15 <=0 25 ng/ml   Beta-2 glycoprotein antibodies    Collection Time: 09/20/19  4:13 PM   Result Value Ref Range    Beta-2 Glyco 1 IgG <9 0 - 20 GPI IgG units    Beta-2 Glyco 1 IgA <9 0 - 25 GPI IgA units    Beta-2 Glyco 1 IgM 12 0 - 32 GPI IgM units   Fingerstick Glucose (POCT)    Collection Time: 09/20/19  8:28 PM   Result Value Ref Range    POC Glucose 183 (H) 65 - 140 mg/dl   Vitamin B12    Collection Time: 09/21/19  5:02 AM   Result Value Ref Range    Vitamin B-12 1,049 (H) 100 - 900 pg/mL   Paraneoplastic Profile II    Collection Time: 09/21/19  5:02 AM   Result Value Ref Range    ANTI-HU ABS <1:10 titer    ANTI-RI ABS <1:10 titer   Sedimentation rate, automated    Collection Time: 09/21/19  5:02 AM   Result Value Ref Range    Sed Rate 52 (H) 0 - 20 mm/hour   Anti-DNA antibody, double-stranded    Collection Time: 09/21/19  5:02 AM   Result Value Ref Range    ds DNA Ab <1 0 - 9 IU/mL   CBC and differential    Collection Time: 09/21/19  5:02 AM   Result Value Ref Range    WBC 15 20 (H) 4 50 - 11 00 Thousand/uL    RBC 3 43 (L) 4 00 - 5 20 Million/uL    Hemoglobin 8 2 (L) 12 0 - 16 0 g/dL    Hematocrit 26 1 (L) 36 0 - 46 0 %    MCV 76 (L) 80 - 100 fL    MCH 24 0 (L) 26 0 - 34 0 pg    MCHC 31 6 31 0 - 36 0 g/dL    RDW 15 3 (H) <15 3 %    MPV 7 9 (L) 8 9 - 12 7 fL    Platelets 655 (H) 009 - 450 Thousands/uL    Neutrophils Relative 76 (H) 45 - 65 %    Lymphocytes Relative 15 (L) 25 - 45 %    Monocytes Relative 4 1 - 10 %    Eosinophils Relative 3 0 - 6 %    Basophils Relative 2 (H) 0 - 1 %    Neutrophils Absolute 11 60 (H) 1 80 - 7 80 Thousands/µL    Lymphocytes Absolute 2 30 0 50 - 4 00 Thousands/µL    Monocytes Absolute 0 60 0 20 - 0 90 Thousand/µL    Eosinophils Absolute 0 50 (H) 0 00 - 0 40 Thousand/µL    Basophils Absolute 0 20 (H) 0 00 - 0 10 Thousands/µL   Comprehensive metabolic panel    Collection Time: 09/21/19  5:02 AM   Result Value Ref Range    Sodium 141 137 - 147 mmol/L    Potassium 4 0 3 6 - 5 0 mmol/L    Chloride 102 97 - 108 mmol/L    CO2 31 (H) 22 - 30 mmol/L    ANION GAP 8 5 - 14 mmol/L    BUN 14 5 - 25 mg/dL    Creatinine 0 75 0 60 - 1 20 mg/dL    Glucose 145 (H) 70 - 99 mg/dL    Calcium 9 2 8 4 - 10 2 mg/dL    AST 29 14 - 36 U/L    ALT 35 9 - 52 U/L    Alkaline Phosphatase 237 (H) 43 - 122 U/L    Total Protein 6 6 5 9 - 8 4 g/dL    Albumin 3 1 3 0 - 5 2 g/dL    Total Bilirubin 0 40 <1 30 mg/dL    eGFR 100 >60 ml/min/1 73sq m   LD,Blood    Collection Time: 09/21/19  5:02 AM   Result Value Ref Range     313 - 618 U/L   Beta 2 microglobulin, serum    Collection Time: 09/21/19  5:02 AM   Result Value Ref Range    Beta-2 Microglobulin 2 4 0 6 - 2 4 mg/L   Erythropoietin    Collection Time: 09/21/19  5:02 AM   Result Value Ref Range    Erythropoietin 30 9 (H) 2 6 - 18 5 mIU/mL   Folate    Collection Time: 09/21/19  5:02 AM   Result Value Ref Range    Folate 4 5 3 1 - 17 5 ng/mL   Haptoglobin    Collection Time: 09/21/19  5:02 AM   Result Value Ref Range    Haptoglobin 660 (HH) 34 - 200 mg/dL   Hemolysis Smear    Collection Time: 09/21/19  5:02 AM   Result Value Ref Range    Hemolysis Smear No Schistocytes or Helmet Cells noted    IgG, IgA, IgM    Collection Time: 09/21/19  5:02 AM   Result Value Ref Range     0 70 0 - 400 0 mg/dL    IGG 1,160 0 700 0-1,600 0 mg/dL     0 40 0 - 230 0 mg/dL   Immunoglobulin free LT chains blood    Collection Time: 09/21/19  5:02 AM   Result Value Ref Range    Ig Kappa Free Light Chain 41 1 (H) 3 3 - 19 4 mg/L    Ig Lambda Free Light Chain 35 4 (H) 5 7 - 26 3 mg/L    Kappa/Lambda FluidC Ratio 1 16 0 26 - 1 65   Hemoglobin Electrophoresis    Collection Time: 09/21/19  5:02 AM   Result Value Ref Range    Hgb A2 Quant 2 0 1 8 - 3 2 %    Hgb C Quant 0 0 0 0 %    Hgb F Quant 0 0 0 0 - 2 0 %    Hgb S Quant 0 0 0 0 %    Hgb Variant 0 0 0 0 %    Hemoglobin Solubility Negative Negative    Hgb Interp   Comment     Hgb A 98 0 96 4 - 98 8 %   Fingerstick Glucose (POCT)    Collection Time: 09/21/19  5:29 AM   Result Value Ref Range    POC Glucose 178 (H) 65 - 140 mg/dl   Direct antiglobulin test    Collection Time: 09/21/19  5:42 AM   Result Value Ref Range    DIRECT JETT Negative    Fingerstick Glucose (POCT)    Collection Time: 09/21/19 11:27 AM   Result Value Ref Range    POC Glucose 200 (H) 65 - 140 mg/dl   Occult blood 1-3, stool    Collection Time: 09/21/19  1:31 PM   Result Value Ref Range    Fecal Occult Blood Diagnostic Negative Negative    Fecal Occult Blood Diagnostic 2      Fecal Occult Blood Diagnostic 3     Fingerstick Glucose (POCT)    Collection Time: 09/21/19  4:00 PM   Result Value Ref Range    POC Glucose 225 (H) 65 - 140 mg/dl   Occult blood 1-3, stool    Collection Time: 09/21/19  6:15 PM   Result Value Ref Range    Fecal Occult Blood Diagnostic Negative Negative    Fecal Occult Blood Diagnostic 2      Fecal Occult Blood Diagnostic 3     Fingerstick Glucose (POCT)    Collection Time: 09/21/19  8:18 PM   Result Value Ref Range    POC Glucose 310 (H) 65 - 140 mg/dl   CK (with reflex to MB)    Collection Time: 09/22/19  4:36 AM   Result Value Ref Range    Total CK 36 30 - 135 U/L   CBC and differential    Collection Time: 09/22/19  4:36 AM   Result Value Ref Range    WBC 13 00 (H) 4 50 - 11 00 Thousand/uL    RBC 3 54 (L) 4 00 - 5 20 Million/uL    Hemoglobin 8 4 (L) 12 0 - 16 0 g/dL    Hematocrit 26 8 (L) 36 0 - 46 0 %    MCV 76 (L) 80 - 100 fL    MCH 23 7 (L) 26 0 - 34 0 pg    MCHC 31 2 31 0 - 36 0 g/dL    RDW 15 6 (H) <15 3 %    MPV 7 7 (L) 8 9 - 12 7 fL    Platelets 327 (H) 444 - 450 Thousands/uL    Neutrophils Relative 73 (H) 45 - 65 %    Lymphocytes Relative 19 (L) 25 - 45 %    Monocytes Relative 5 1 - 10 %    Eosinophils Relative 3 0 - 6 %    Basophils Relative 1 0 - 1 %    Neutrophils Absolute 9 50 (H) 1 80 - 7 80 Thousands/µL    Lymphocytes Absolute 2 40 0 50 - 4 00 Thousands/µL    Monocytes Absolute 0 60 0 20 - 0 90 Thousand/µL    Eosinophils Absolute 0 40 0 00 - 0 40 Thousand/µL    Basophils Absolute 0 10 0 00 - 0 10 Thousands/µL   Comprehensive metabolic panel    Collection Time: 09/22/19  4:36 AM   Result Value Ref Range    Sodium 137 137 - 147 mmol/L    Potassium 4 3 3 6 - 5 0 mmol/L    Chloride 101 97 - 108 mmol/L    CO2 29 22 - 30 mmol/L    ANION GAP 7 5 - 14 mmol/L    BUN 14 5 - 25 mg/dL    Creatinine 0 70 0 60 - 1 20 mg/dL    Glucose 217 (H) 70 - 99 mg/dL    Calcium 9 2 8 4 - 10 2 mg/dL    AST 24 14 - 36 U/L    ALT 31 9 - 52 U/L    Alkaline Phosphatase 235 (H) 43 - 122 U/L    Total Protein 6 9 5 9 - 8 4 g/dL    Albumin 3 3 3 0 - 5 2 g/dL    Total Bilirubin 0 50 <1 30 mg/dL    eGFR 109 >60 ml/min/1 73sq m   Smear Review(Phlebs Do Not Order)    Collection Time: 09/22/19  4:36 AM   Result Value Ref Range    RBC Morphology abnormal     Anisocytosis Present     Hypochromia Present     Platelet Estimate Increased (A) Adequate   Fingerstick Glucose (POCT)    Collection Time: 09/22/19  5:48 AM   Result Value Ref Range    POC Glucose 228 (H) 65 - 140 mg/dl   Fingerstick Glucose (POCT)    Collection Time: 09/22/19 11:25 AM   Result Value Ref Range    POC Glucose 214 (H) 65 - 140 mg/dl   Fingerstick Glucose (POCT)    Collection Time: 09/22/19  3:52 PM   Result Value Ref Range    POC Glucose 187 (H) 65 - 140 mg/dl   Fingerstick Glucose (POCT)    Collection Time: 09/22/19  8:21 PM   Result Value Ref Range    POC Glucose 179 (H) 65 - 140 mg/dl   CBC and differential    Collection Time: 09/23/19  5:44 AM   Result Value Ref Range    WBC 12 90 (H) 4 50 - 11 00 Thousand/uL    RBC 3 56 (L) 4 00 - 5 20 Million/uL    Hemoglobin 8 5 (L) 12 0 - 16 0 g/dL    Hematocrit 27 2 (L) 36 0 - 46 0 %    MCV 77 (L) 80 - 100 fL    MCH 24 0 (L) 26 0 - 34 0 pg    MCHC 31 3 31 0 - 36 0 g/dL    RDW 15 6 (H) <15 3 %    MPV 7 9 (L) 8 9 - 12 7 fL    Platelets 267 (H) 140 - 450 Thousands/uL    Neutrophils Relative 73 (H) 45 - 65 %    Lymphocytes Relative 19 (L) 25 - 45 %    Monocytes Relative 4 1 - 10 %    Eosinophils Relative 3 0 - 6 %    Basophils Relative 1 0 - 1 %    Neutrophils Absolute 9 50 (H) 1 80 - 7 80 Thousands/µL    Lymphocytes Absolute 2 50 0 50 - 4 00 Thousands/µL    Monocytes Absolute 0 50 0 20 - 0 90 Thousand/µL    Eosinophils Absolute 0 30 0 00 - 0 40 Thousand/µL    Basophils Absolute 0 20 (H) 0 00 - 0 10 Thousands/µL   Comprehensive metabolic panel    Collection Time: 09/23/19  5:44 AM   Result Value Ref Range    Sodium 140 137 - 147 mmol/L    Potassium 4 1 3 6 - 5 0 mmol/L    Chloride 102 97 - 108 mmol/L    CO2 30 22 - 30 mmol/L    ANION GAP 8 5 - 14 mmol/L    BUN 15 5 - 25 mg/dL    Creatinine 0 77 0 60 - 1 20 mg/dL    Glucose 181 (H) 70 - 99 mg/dL    Calcium 9 2 8 4 - 10 2 mg/dL    AST 26 14 - 36 U/L    ALT 26 9 - 52 U/L    Alkaline Phosphatase 264 (H) 43 - 122 U/L    Total Protein 7 3 5 9 - 8 4 g/dL    Albumin 3 3 3 0 - 5 2 g/dL    Total Bilirubin 0 40 <1 30 mg/dL    eGFR 97 >60 ml/min/1 73sq m   Smear Review(Phlebs Do Not Order)    Collection Time: 09/23/19  5:44 AM   Result Value Ref Range    RBC Morphology abnormal     Hypochromia Present     Microcytes Present     Platelet Estimate Increased (A) Adequate   Fingerstick Glucose (POCT)    Collection Time: 09/23/19  6:00 AM   Result Value Ref Range    POC Glucose 190 (H) 65 - 140 mg/dl   Fingerstick Glucose (POCT)    Collection Time: 09/23/19 11:22 AM   Result Value Ref Range    POC Glucose 204 (H) 65 - 140 mg/dl   Fingerstick Glucose (POCT)    Collection Time: 09/23/19  3:42 PM   Result Value Ref Range    POC Glucose 205 (H) 65 - 140 mg/dl   Fingerstick Glucose (POCT)    Collection Time: 09/23/19  9:02 PM   Result Value Ref Range    POC Glucose 137 65 - 140 mg/dl   Fingerstick Glucose (POCT)    Collection Time: 09/24/19  6:32 AM   Result Value Ref Range    POC Glucose 181 (H) 65 - 140 mg/dl   Fingerstick Glucose (POCT)    Collection Time: 09/24/19 11:40 AM   Result Value Ref Range    POC Glucose 185 (H) 65 - 140 mg/dl   Fingerstick Glucose (POCT)    Collection Time: 09/24/19  4:30 PM   Result Value Ref Range    POC Glucose 246 (H) 65 - 140 mg/dl   Fingerstick Glucose (POCT)    Collection Time: 09/24/19  8:38 PM   Result Value Ref Range    POC Glucose 191 (H) 65 - 140 mg/dl   Fingerstick Glucose (POCT)    Collection Time: 09/25/19  6:02 AM   Result Value Ref Range    POC Glucose 128 65 - 140 mg/dl   Fingerstick Glucose (POCT)    Collection Time: 09/25/19 11:54 AM   Result Value Ref Range    POC Glucose 132 65 - 140 mg/dl   Fingerstick Glucose (POCT)    Collection Time: 09/25/19  5:45 PM   Result Value Ref Range    POC Glucose 255 (H) 65 - 140 mg/dl   Fingerstick Glucose (POCT)    Collection Time: 09/25/19  9:24 PM   Result Value Ref Range    POC Glucose 140 65 - 140 mg/dl   Fingerstick Glucose (POCT)    Collection Time: 09/26/19  6:15 AM   Result Value Ref Range    POC Glucose 77 65 - 140 mg/dl   Fingerstick Glucose (POCT)    Collection Time: 09/26/19 11:49 AM   Result Value Ref Range    POC Glucose 242 (H) 65 - 140 mg/dl   Fingerstick Glucose (POCT)    Collection Time: 09/26/19  4:53 PM   Result Value Ref Range    POC Glucose 88 65 - 140 mg/dl   Fingerstick Glucose (POCT)    Collection Time: 09/26/19  9:21 PM   Result Value Ref Range    POC Glucose 218 (H) 65 - 140 mg/dl   Fingerstick Glucose (POCT)    Collection Time: 09/27/19  6:05 AM   Result Value Ref Range    POC Glucose 156 (H) 65 - 140 mg/dl   Fingerstick Glucose (POCT)    Collection Time: 09/27/19 11:53 AM   Result Value Ref Range    POC Glucose 92 65 - 140 mg/dl Fingerstick Glucose (POCT)    Collection Time: 09/27/19  4:59 PM   Result Value Ref Range    POC Glucose 218 (H) 65 - 140 mg/dl   Fingerstick Glucose (POCT)    Collection Time: 09/27/19  9:35 PM   Result Value Ref Range    POC Glucose 144 (H) 65 - 140 mg/dl   Fingerstick Glucose (POCT)    Collection Time: 09/28/19  6:24 AM   Result Value Ref Range    POC Glucose 103 65 - 140 mg/dl   Fingerstick Glucose (POCT)    Collection Time: 09/28/19 11:49 AM   Result Value Ref Range    POC Glucose 221 (H) 65 - 140 mg/dl   Fingerstick Glucose (POCT)    Collection Time: 09/28/19  4:24 PM   Result Value Ref Range    POC Glucose 133 65 - 140 mg/dl   Fingerstick Glucose (POCT)    Collection Time: 09/28/19  8:31 PM   Result Value Ref Range    POC Glucose 199 (H) 65 - 140 mg/dl   Fingerstick Glucose (POCT)    Collection Time: 09/29/19  6:09 AM   Result Value Ref Range    POC Glucose 128 65 - 140 mg/dl   Fingerstick Glucose (POCT)    Collection Time: 09/29/19 12:12 PM   Result Value Ref Range    POC Glucose 115 65 - 140 mg/dl   Fingerstick Glucose (POCT)    Collection Time: 09/29/19  4:36 PM   Result Value Ref Range    POC Glucose 114 65 - 140 mg/dl   Fingerstick Glucose (POCT)    Collection Time: 09/29/19  8:32 PM   Result Value Ref Range    POC Glucose 104 65 - 140 mg/dl   Sedimentation rate, automated    Collection Time: 09/30/19  4:54 AM   Result Value Ref Range    Sed Rate 121 (H) 1 - 20 mm/hour   C-reactive protein    Collection Time: 09/30/19  4:54 AM   Result Value Ref Range    CRP 23 2 (H) <10 0 mg/L   Fingerstick Glucose (POCT)    Collection Time: 09/30/19  6:04 AM   Result Value Ref Range    POC Glucose 98 65 - 140 mg/dl   Fingerstick Glucose (POCT)    Collection Time: 09/30/19 11:56 AM   Result Value Ref Range    POC Glucose 112 65 - 140 mg/dl   ]    Procedure: Cta Head And Neck W Wo Contrast    Result Date: 9/20/2019  Narrative: CTA NECK AND BRAIN WITH AND WITHOUT CONTRAST INDICATION: Neuro deficit(s), subacute Vertigo, persistent, central COMPARISON:   Head CT 9/9/2019 TECHNIQUE:  Routine CT imaging of the Brain without contrast   Post contrast imaging was performed after administration of iodinated contrast through the neck and brain  Post contrast axial 0 625 mm images timed to opacify the arterial system  3D rendering was performed on an independent workstation  MIP reconstructions performed  Coronal reconstructions were performed of the noncontrast portion of the brain  Radiation dose length product (DLP) for this visit:  0837 mGy-cm   This examination, like all CT scans performed in the [facility], was performed utilizing techniques to minimize radiation dose exposure, including the use of iterative reconstruction and automated exposure control  IV Contrast:  100 mL of iohexol (OMNIPAQUE)  IMAGE QUALITY:   Diagnostic FINDINGS: NONCONTRAST BRAIN PARENCHYMA: No acute intracranial hemorrhage  No masslike lesion, mass effect effect or midline shift  No CT evidence for acute infarct  Cerebral atrophy more than expected for patient's age  VENTRICLES AND EXTRA-AXIAL SPACES:  Normal for the patient's age  VISUALIZED ORBITS AND PARANASAL SINUSES:  Unremarkable  CERVICAL VASCULATURE CT angiogram images are somewhat degraded by technique  AORTIC ARCH AND GREAT VESSELS: Aortic arch and great vessel origins are unremarkable  RIGHT VERTEBRAL ARTERY CERVICAL SEGMENT:The vessel origin is unremarkable  The vessel is patent throughout the neck without significant stenosis  LEFT VERTEBRAL ARTERY CERVICAL SEGMENT: The vessel origin is unremarkable  The vessel is patent throughout the neck without significant stenosis  RIGHT EXTRACRANIAL CAROTID SEGMENT: The carotid bifurcation and cervical internal carotid artery are unremarkable    No stenosis or dissection  LEFT EXTRACRANIAL CAROTID SEGMENT: The carotid bifurcation and cervical internal carotid artery are unremarkable    No stenosis or dissection   NASCET criteria was used to determine the degree of internal carotid artery diameter stenosis  INTRACRANIAL VASCULATURE INTERNAL CAROTID ARTERIES: There is mild atherosclerotic disease of cavernous and supraclinoid internal carotid arteries without significant stenosis  Normal ophthalmic artery origins  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 (series 6 images [id number]) ANTERIOR CIRCULATION:  Absent/hypoplastic right A1 segment  Right A2 segment is perfused through anterior communicating artery  Normal left A1 segment  Bilateral anterior cerebral arteries are unremarkable  MIDDLE CEREBRAL ARTERY CIRCULATION:  Bilateral M1 segments and major M2 branches are patent without critical stenosis  DISTAL VERTEBRAL ARTERIES:  Distal vertebral arteries are unremarkable  Normal right PICA origin  Suggestion of common trunk left AICA/PICA BASILAR ARTERY:  Basilar artery is normal in caliber  Normal superior cerebellar arteries  POSTERIOR CEREBRAL ARTERIES: Bilateral posterior cerebral arteries are unremarkable without critical stenosis  Hypoplastic bilateral posterior communicating arteries DURAL VENOUS SINUSES:  Unremarkable  NON VASCULAR ANATOMY BONY STRUCTURES: No acute or aggressive appearing osseous abnormality  SOFT TISSUES OF THE NECK:  Unremarkable  THORACIC INLET:  Unremarkable  Impression: 1  No acute intracranial CT abnormality  Cerebral atrophy more than expected for patient's age  2   Patent major intracranial vasculature without critical stenosis  3   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  The flow void in T2 sequence in same date MRI is not as pronounced as seen in CTA  There might be some venous component  Therefore MR angiogram is recommended for further evaluation  4   Unremarkable CT angiogram of the neck   The study was marked in EPIC for significant notification  Workstation performed: [id number]     Procedure: Xr Chest 1 View Portable    Result Date: 9/10/2019  Narrative: CHEST INDICATION:   shoulder pain  COMPARISON:  8/4/2018 EXAM PERFORMED/VIEWS:  XR CHEST PORTABLE FINDINGS: Cardiomediastinal silhouette appears unremarkable  The lungs are clear  No pneumothorax or pleural effusion  Osseous structures appear within normal limits for patient age  Impression: No acute cardiopulmonary disease  Workstation performed: [id number]     Procedure: Xr Forearm 2 Views Right    Result Date: 9/16/2019  Narrative: RIGHT FOREARM INDICATION:   pain and swelling  COMPARISON:  None VIEWS:  XR FOREARM 2 VW RIGHT FINDINGS: There is no acute fracture or dislocation  There are degenerative changes of the elbow and wrist  No lytic or blastic lesions are seen  Soft tissues are unremarkable  Impression: No acute osseous abnormality  Workstation performed: [id number]     Procedure: Xr Hand 3+ Views Right    Result Date: 9/16/2019  Narrative: RIGHT HAND INDICATION:   pain, swelling  COMPARISON:  None VIEWS:  XR HAND 3+ VW RIGHT For the purposes of institution wide universal language the following terms will apply: (thumb=1st digit/finger, index finger=2nd digit/finger, long finger=3rd digit/finger, ring=4th digit/finger and small finger=5th digit/finger) FINDINGS: There is no acute fracture or dislocation  Degenerative changes of 1st carpometacarpal joint, radiocarpal joint and scattered throughout the DIP and PIP joints  No lytic or blastic lesions are seen  There is dorsal soft tissue swelling overlying the hand  Impression: No acute osseous abnormality  Dorsal soft tissue swelling  Degenerative changes as described  Workstation performed: [id number]     Procedure: Ct Head Without Contrast    Result Date: 9/9/2019  Narrative: CT BRAIN - WITHOUT CONTRAST INDICATION:   Headache, acute, norm neuro exam  COMPARISON:  None   TECHNIQUE:  CT examination of the brain was performed  In addition to axial images, coronal 2D reformatted images were created and submitted for interpretation  Radiation dose length product (DLP) for this visit:  1065 mGy-cm   This examination, like all CT scans performed in the [facility], was performed utilizing techniques to minimize radiation dose exposure, including the use of iterative reconstruction and automated exposure control  IMAGE QUALITY:  Diagnostic  FINDINGS: PARENCHYMA:  No intracranial mass, mass effect or midline shift  No CT signs of acute infarction  No acute parenchymal hemorrhage  There is mild periventricular white matter low attenuation which is nonspecific and most likely related to chronic small vessel ischemic changes  VENTRICLES AND EXTRA-AXIAL SPACES:  There is prominence of the ventricles and sulci related to mild volume loss  VISUALIZED ORBITS AND PARANASAL SINUSES:  Unremarkable  CALVARIUM AND EXTRACRANIAL SOFT TISSUES:  Normal      Impression: No acute intracranial abnormality  Mild chronic small vessel ischemic changes and volume loss  Workstation performed: [id number]     Procedure: Cta Upper Extremity Right W Wo Contrast    Result Date: 9/16/2019  Narrative: CT ANGIOGRAM OF THE RIGHT UPPER EXTREMITY, WITH AND WITHOUT IV CONTRAST INDICATION:  Swelling  Weakness  Intact pulses  COMPARISON: None  TECHNIQUE:  CT angiogram examination of the chest was performed according to standard protocol  Contrast as well as noncontrast images were obtained  This examination, like all CT scans performed in the [facility], was performed utilizing techniques to minimize radiation dose exposure, including the use of iterative reconstruction and automated exposure control  3D reconstructions were performed an independent workstation, and are supplied for review    Rad dose 2317 mGy-cm IV Contrast:  100 mL of iohexol (OMNIPAQUE)  FINDINGS: VASCULAR STRUCTURES:  The aortic arch is normal with variant branching anatomy  A common trunk supplies the right brachiocephalic artery and left common carotid artery  The right subclavian artery, axillary artery, and brachial artery are widely patent  with continuous three-vessel runoff to the hand  Edema is noted in the subcutaneous fat of the distal forearm and hand without a vascular etiology  VISUALIZED STRUCTURES OF THE CHEST: 27 x 17 mm left submandibular lymph node  Impression: Normal CTA of the right upper extremity Enlarged left submandibular lymph node Workstation performed: [id number]     Procedure: Mra Head Wo Contrast    Result Date: 9/23/2019  Narrative: MRA BRAIN WITHOUT CONTRAST INDICATION:  aneurysm COMPARISON:  CTA 9/20/2019 TECHNIQUE:  Axial 3-D time-of-flight imaging with 3-D reconstructions performed without contrast    IV Contrast:  Not administered  FINDINGS: IMAGE QUALITY:  Diagnostic  ANATOMY INTERNAL CAROTID ARTERIES:  Right petrous and cavernous carotid artery are normal   ICA termination normal, ophthalmic artery origin normal   The [place] of the left cavernous carotid artery  There appear to be 2 distinct aneurysms within the cavernous carotid segment, extending laterally over the course of approximately 6 mm at the base of 2 6 mm at the height  A 2nd small focus is noted extending from the undersurface of the anterior genu of the cavernous carotid approximately a 4 mm at the base in the 3 mm in height  ANTERIOR CIRCULATION:  Left A1 is dominant, the right is markedly cystic anterior communicating artery is patent and RAUL branches within the hemispheric fissure normal  MIDDLE CEREBRAL ARTERY CIRCULATION:  The M1 segment and middle cerebral artery branches demonstrate normal flow-related enhancement  DISTAL VERTEBRAL ARTERIES:  Distal vertebral arteries are patient with a normal vertebrobasilar junction  The right posterior inferior cerebellar artery origin is not included on this exam, but appears normal on CTA 9/20/2019    Probable left AICA/PICA  BASILAR ARTERY:  Normal  POSTERIOR CEREBRAL ARTERIES:  Both posterior cerebral arteries arises from the basilar tip  Both arteries demonstrate normal flow-related enhancement  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  The proximal lesion approaches 6 x 2 6 mm, distal lesion 4 x 3 mm  No intradural aneurysm  Right A1 segment markedly hypoplastic, dominant flow to the right RAUL through the anterior communicating artery  Workstation performed: [id number]     Procedure: Mri Brain Wo Contrast    Result Date: 9/20/2019  Narrative: MRI BRAIN WITHOUT CONTRAST INDICATION: History from chart: Headaches COMPARISON:   Same date CTA head and neck and head CT 9/19/2019 TECHNIQUE:  Sagittal T1, axial T2, axial FLAIR, axial T1, axial [place] and axial diffusion imaging  IMAGE QUALITY:  Diagnostic  FINDINGS: BRAIN PARENCHYMA:  There is cerebral atrophy more than expected for patient's age  There is no discrete mass, mass effect or midline shift  There is no intracranial hemorrhage  There is no evidence of acute infarction and diffusion imaging is unremarkable  Scattered periventricular and subcortical white matter T2/FLAIR hyperintense foci, primarily involving frontal lobes VENTRICLES:  The ventricles are normal in size and contour  SELLA AND PITUITARY GLAND:  Normal  ORBITS:  Normal  PARANASAL SINUSES:  Normal  VASCULATURE:  Evaluation of the major intracranial vasculature demonstrates appropriate flow voids  CALVARIUM AND SKULL BASE:  Normal  EXTRACRANIAL SOFT TISSUES:  Normal      Impression: 1  No acute ischemic disease  2   Scattered periventricular and subcortical white matter T2/FLAIR hyperintense foci, primarily involving frontal lobes, main differential considerations include mild chronic white matter microangiopathy versus sequela of chronic migraine disease   3   Cerebral atrophy, more than expected for patient's age Workstation performed: [id number]     Procedure: Us Gallbladder    Result Date: 9/17/2019  Narrative: RIGHT UPPER QUADRANT ULTRASOUND INDICATION:     ABD PAIN, FEVER, NO RECENT SURGERY elevation alk phos and llfts acute  COMPARISON:  None TECHNIQUE:   Real-time ultrasound of the right upper quadrant was performed with a curvilinear transducer with both volumetric sweeps and still imaging techniques  FINDINGS: PANCREAS:  Visualized portions of the pancreas are within normal limits  AORTA AND IVC:  Visualized portions are normal for patient age  LIVER: Size:  Moderately enlarged  The liver measures 20 cm in the midclavicular line  Contour:  Surface contour is smooth  Parenchyma:  Echogenicity and echotexture are within normal limits  No evidence of suspicious mass  Limited imaging of the main portal vein shows it to be patent and hepatopetal   BILIARY: The gallbladder is normal in caliber  No wall thickening or pericholecystic fluid  There are multiple dependent calculi without sludge  No sonographic Mcconnell sign  No intrahepatic biliary dilatation  CBD measures 6 mm  No choledocholithiasis  KIDNEY: Right kidney measures 9 8 x 4 3 cm  Within normal limits  ASCITES:   None  Impression: 1  Hepatomegaly  2   Cholelithiasis  Workstation performed: [id number]     Procedure: Xr Chest 1 View    Result Date: 9/16/2019  Narrative: CHEST INDICATION:   cough  COMPARISON:  Chest radiograph 9/9/2019 EXAM PERFORMED/VIEWS:  XR CHEST 1 VIEW FINDINGS: Cardiomediastinal silhouette appears unremarkable  The lungs are clear  No pneumothorax or pleural effusion  Osseous structures appear within normal limits for patient age  Impression: No acute cardiopulmonary disease  Workstation performed: [id number]     Procedure: Ct Chest Abdomen Pelvis W Contrast    Result Date: 9/21/2019  Narrative: CT CHEST, ABDOMEN AND PELVIS WITH IV CONTRAST INDICATION:   Evaluate patient for malignancy- elevated alk phos, fatigue, unexplained swelling BUE  COMPARISON:  CT chest dated August 4, 2018  TECHNIQUE: CT examination of the chest, abdomen and pelvis was performed  In addition to portal venous phase postcontrast scanning through the abdomen and pelvis, delayed phase postcontrast scanning was performed through the upper abdominal viscera  Axial, sagittal, and coronal 2D reformatted images were created from the source data and submitted for interpretation  Radiation dose length product (DLP) for this visit:  1866 mGy-cm   This examination, like all CT scans performed in the [facility], was performed utilizing techniques to minimize radiation dose exposure, including the use of iterative reconstruction and automated exposure control  IV Contrast:  100 mL of iohexol (OMNIPAQUE) Enteric Contrast: Enteric contrast was administered  FINDINGS: CHEST LUNGS:  The central airways are patent  Mild bibasilar atelectasis  No suspicious mass or focal consolidation  There car stable pulmonary nodules including: -Stable 6 mm groundglass nodule in the right middle lobe (series 3 image 71) -stable 4 mm right upper lobe subpleural nodule (series 3 image 74) -stable 3 mm left upper lobe pleural-based nodule (series 3 image 64)  PLEURA:  Mild pleural thickening along the left lower lobe  No pleural effusion or pneumothorax  HEART/GREAT VESSELS:  Normal cardiac size  No pericardial effusion  Stable enlargement of the ascending thoracic aorta measuring 4 2 cm  Stable mild enlargement of the main pulmonary artery measuring 3 7 cm, a finding which may be seen in the setting of pulmonary hypertension  MEDIASTINUM AND CRISTAL:  No mediastinal lymphadenopathy  Small hiatal hernia  CHEST WALL AND LOWER NECK:   Stable scattered subcentimeter bilateral axillary lymph nodes with normal lymph node morphology and fatty hilum  No enlarged lymph nodes by imaging size criteria  ABDOMEN LIVER/BILIARY TREE:  Unremarkable  GALLBLADDER:  Multiple gallstones  No pericholecystic inflammatory changes   SPLEEN: Unremarkable  PANCREAS:  Unremarkable  ADRENAL GLANDS:  Unremarkable  KIDNEYS/URETERS:  No hydroureteronephrosis  Small hypoattenuating lesions bilaterally, not adequately characterized, but likely cysts  STOMACH AND BOWEL:  There are scattered colonic diverticuli without evidence of diverticulitis  Moderate volume retained stool in the colon  No bowel obstruction  APPENDIX:  No findings to suggest appendicitis  ABDOMINOPELVIC CAVITY:  No ascites or free intraperitoneal air  No lymphadenopathy  VESSELS:  Unremarkable for patient's age  PELVIS REPRODUCTIVE ORGANS:  Status post hysterectomy  No suspicious adnexal mass  URINARY BLADDER:  Unremarkable  ABDOMINAL WALL/INGUINAL REGIONS:  Small fat-containing umbilical hernia  Induration of the subcutaneous fat in the anterior abdominal wall with small flecks of air, likely related to medication injection sites  OSSEOUS STRUCTURES:  No acute fracture or destructive osseous lesion  Degenerative changes of the spine  Mild degenerative arthropathy in the hips, sacroiliac joints and symphysis pubis   Impression: 1  No acute findings in the chest, abdomen or pelvis  2   Stable pulmonary nodules measuring up to 6 mm  Follow-up chest CT is recommended in 12 months to document continued stability  3  Stable enlargement of the ascending thoracic aorta measuring 4 2 cm and is stable enlargement of the main pulmonary artery measuring 3 7 cm  4   Cholelithiasis without findings of acute cholecystitis  5   Small hiatal hernia  The study is marked on Epic for follow-up evaluation  Workstation performed: [id number]     Procedure: Vas Upper Limb Venous Duplex Scan, Unilateral/limited    Result Date: 9/16/2019  Narrative:  [facility] REPORT CLINICAL: Indications: Patient presents with upper lower extremity pain and swelling x 5 days   Operative History: Denies Any Cardiovascular Procedures Risk Factors The patient has history of Obesity, HTN, Sickle Cell Trait, Diabetes (Yes) and HLD  FINDINGS:  Segment       Right            Left                        Impression       Impression       Int  Jugular  Normal (Patent)  Normal (Patent)  Bas Upper     Normal                            Ceph Upper    Normal                               CONCLUSION:  Impression RIGHT UPPER LIMB: No evidence of acute or chronic deep vein thrombosis  No evidence of superficial thrombophlebitis noted  Doppler evaluation shows a normal response to augmentation maneuvers  LEFT UPPER LIMB LIMITED: Evaluation shows no evidence of thrombus in the internal jugular vein, subclavian vein, and the brachiocephalic vein  No previous study available for comparison  Technical findings shared with Dr Alia Recinos and faxed to chart    SIGNATURE: Electronically Signed by: Jerson Bailey on 2019-09-16 07:23:21 PM

## 2019-10-09 ENCOUNTER — TELEPHONE (OUTPATIENT)
Dept: VASCULAR SURGERY | Facility: CLINIC | Age: 59
End: 2019-10-09

## 2019-10-09 ENCOUNTER — TELEPHONE (OUTPATIENT)
Dept: RHEUMATOLOGY | Facility: CLINIC | Age: 59
End: 2019-10-09

## 2019-10-09 ENCOUNTER — OFFICE VISIT (OUTPATIENT)
Dept: RHEUMATOLOGY | Facility: CLINIC | Age: 59
End: 2019-10-09
Payer: COMMERCIAL

## 2019-10-09 ENCOUNTER — APPOINTMENT (OUTPATIENT)
Dept: LAB | Facility: CLINIC | Age: 59
End: 2019-10-09
Payer: COMMERCIAL

## 2019-10-09 VITALS
HEART RATE: 89 BPM | BODY MASS INDEX: 46.25 KG/M2 | SYSTOLIC BLOOD PRESSURE: 138 MMHG | DIASTOLIC BLOOD PRESSURE: 72 MMHG | WEIGHT: 293 LBS

## 2019-10-09 DIAGNOSIS — E66.01 CLASS 3 SEVERE OBESITY WITH BODY MASS INDEX (BMI) OF 45.0 TO 49.9 IN ADULT, UNSPECIFIED OBESITY TYPE, UNSPECIFIED WHETHER SERIOUS COMORBIDITY PRESENT (HCC): ICD-10-CM

## 2019-10-09 DIAGNOSIS — R76.0 ANTICARDIOLIPIN ANTIBODY POSITIVE: ICD-10-CM

## 2019-10-09 DIAGNOSIS — R70.0 ELEVATED SED RATE: ICD-10-CM

## 2019-10-09 DIAGNOSIS — D72.829 LEUKOCYTOSIS, UNSPECIFIED TYPE: ICD-10-CM

## 2019-10-09 DIAGNOSIS — D50.9 MICROCYTIC ANEMIA: ICD-10-CM

## 2019-10-09 DIAGNOSIS — R70.0 ELEVATED SED RATE: Primary | ICD-10-CM

## 2019-10-09 DIAGNOSIS — IMO0001 IDDM (INSULIN DEPENDENT DIABETES MELLITUS): ICD-10-CM

## 2019-10-09 DIAGNOSIS — R51.9 NEW ONSET HEADACHE: ICD-10-CM

## 2019-10-09 DIAGNOSIS — D75.839 THROMBOCYTOSIS: ICD-10-CM

## 2019-10-09 DIAGNOSIS — IMO0002 UNCONTROLLED TYPE 2 DIABETES MELLITUS WITH OPHTHALMIC COMPLICATION, WITH LONG-TERM CURRENT USE OF INSULIN: Primary | ICD-10-CM

## 2019-10-09 DIAGNOSIS — R63.4 UNINTENTIONAL WEIGHT LOSS: ICD-10-CM

## 2019-10-09 DIAGNOSIS — M79.89 ARM SWELLING: ICD-10-CM

## 2019-10-09 DIAGNOSIS — R79.82 ELEVATED C-REACTIVE PROTEIN (CRP): ICD-10-CM

## 2019-10-09 LAB
ALBUMIN SERPL BCP-MCNC: 2.8 G/DL (ref 3.5–5)
ALP SERPL-CCNC: 213 U/L (ref 46–116)
ALT SERPL W P-5'-P-CCNC: 15 U/L (ref 12–78)
ANION GAP SERPL CALCULATED.3IONS-SCNC: 8 MMOL/L (ref 4–13)
AST SERPL W P-5'-P-CCNC: 10 U/L (ref 5–45)
BACTERIA UR QL AUTO: ABNORMAL /HPF
BASOPHILS # BLD AUTO: 0.04 THOUSANDS/ΜL (ref 0–0.1)
BASOPHILS NFR BLD AUTO: 0 % (ref 0–1)
BILIRUB SERPL-MCNC: 0.4 MG/DL (ref 0.2–1)
BILIRUB UR QL STRIP: NEGATIVE
BUN SERPL-MCNC: 19 MG/DL (ref 5–25)
C3 SERPL-MCNC: 183 MG/DL (ref 90–180)
C4 SERPL-MCNC: 45 MG/DL (ref 10–40)
CALCIUM SERPL-MCNC: 9.3 MG/DL (ref 8.3–10.1)
CHLORIDE SERPL-SCNC: 105 MMOL/L (ref 100–108)
CK SERPL-CCNC: 58 U/L (ref 26–192)
CLARITY UR: CLEAR
CO2 SERPL-SCNC: 28 MMOL/L (ref 21–32)
COLOR UR: YELLOW
CREAT SERPL-MCNC: 1.07 MG/DL (ref 0.6–1.3)
CREAT UR-MCNC: 120 MG/DL
CRP SERPL QL: 20.7 MG/L
EOSINOPHIL # BLD AUTO: 0.34 THOUSAND/ΜL (ref 0–0.61)
EOSINOPHIL NFR BLD AUTO: 4 % (ref 0–6)
ERYTHROCYTE [DISTWIDTH] IN BLOOD BY AUTOMATED COUNT: 16.4 % (ref 11.6–15.1)
ERYTHROCYTE [SEDIMENTATION RATE] IN BLOOD: 95 MM/HOUR (ref 0–20)
FERRITIN SERPL-MCNC: 270 NG/ML (ref 8–388)
GFR SERPL CREATININE-BSD FRML MDRD: 65 ML/MIN/1.73SQ M
GLUCOSE SERPL-MCNC: 142 MG/DL (ref 65–140)
GLUCOSE UR STRIP-MCNC: NEGATIVE MG/DL
HCT VFR BLD AUTO: 31.6 % (ref 34.8–46.1)
HGB BLD-MCNC: 9.3 G/DL (ref 11.5–15.4)
HGB UR QL STRIP.AUTO: NEGATIVE
IMM GRANULOCYTES # BLD AUTO: 0.06 THOUSAND/UL (ref 0–0.2)
IMM GRANULOCYTES NFR BLD AUTO: 1 % (ref 0–2)
KETONES UR STRIP-MCNC: NEGATIVE MG/DL
LEUKOCYTE ESTERASE UR QL STRIP: NEGATIVE
LYMPHOCYTES # BLD AUTO: 2.14 THOUSANDS/ΜL (ref 0.6–4.47)
LYMPHOCYTES NFR BLD AUTO: 22 % (ref 14–44)
MCH RBC QN AUTO: 23.7 PG (ref 26.8–34.3)
MCHC RBC AUTO-ENTMCNC: 29.4 G/DL (ref 31.4–37.4)
MCV RBC AUTO: 81 FL (ref 82–98)
MONOCYTES # BLD AUTO: 0.46 THOUSAND/ΜL (ref 0.17–1.22)
MONOCYTES NFR BLD AUTO: 5 % (ref 4–12)
MUCOUS THREADS UR QL AUTO: ABNORMAL
NEUTROPHILS # BLD AUTO: 6.51 THOUSANDS/ΜL (ref 1.85–7.62)
NEUTS SEG NFR BLD AUTO: 68 % (ref 43–75)
NITRITE UR QL STRIP: NEGATIVE
NON-SQ EPI CELLS URNS QL MICRO: ABNORMAL /HPF
NRBC BLD AUTO-RTO: 0 /100 WBCS
PH UR STRIP.AUTO: 6.5 [PH]
PLATELET # BLD AUTO: 396 THOUSANDS/UL (ref 149–390)
PMV BLD AUTO: 9.4 FL (ref 8.9–12.7)
POTASSIUM SERPL-SCNC: 4 MMOL/L (ref 3.5–5.3)
PROT SERPL-MCNC: 7.7 G/DL (ref 6.4–8.2)
PROT UR STRIP-MCNC: ABNORMAL MG/DL
PROT UR-MCNC: 22 MG/DL
PROT/CREAT UR: 0.18 MG/G{CREAT} (ref 0–0.1)
RBC # BLD AUTO: 3.92 MILLION/UL (ref 3.81–5.12)
RBC #/AREA URNS AUTO: ABNORMAL /HPF
RNA AB SER-ACNC: 18.8 EU/ML
SODIUM SERPL-SCNC: 141 MMOL/L (ref 136–145)
SP GR UR STRIP.AUTO: 1.01 (ref 1–1.03)
URATE CRY URNS QL MICRO: ABNORMAL /HPF
UROBILINOGEN UR QL STRIP.AUTO: 0.2 E.U./DL
WBC # BLD AUTO: 9.55 THOUSAND/UL (ref 4.31–10.16)
WBC #/AREA URNS AUTO: ABNORMAL /HPF

## 2019-10-09 PROCEDURE — 86256 FLUORESCENT ANTIBODY TITER: CPT

## 2019-10-09 PROCEDURE — 80053 COMPREHEN METABOLIC PANEL: CPT

## 2019-10-09 PROCEDURE — 86334 IMMUNOFIX E-PHORESIS SERUM: CPT | Performed by: PATHOLOGY

## 2019-10-09 PROCEDURE — 84165 PROTEIN E-PHORESIS SERUM: CPT | Performed by: PATHOLOGY

## 2019-10-09 PROCEDURE — 81001 URINALYSIS AUTO W/SCOPE: CPT | Performed by: INTERNAL MEDICINE

## 2019-10-09 PROCEDURE — 36415 COLL VENOUS BLD VENIPUNCTURE: CPT

## 2019-10-09 PROCEDURE — 86334 IMMUNOFIX E-PHORESIS SERUM: CPT

## 2019-10-09 PROCEDURE — 87389 HIV-1 AG W/HIV-1&-2 AB AG IA: CPT

## 2019-10-09 PROCEDURE — 99215 OFFICE O/P EST HI 40 MIN: CPT | Performed by: INTERNAL MEDICINE

## 2019-10-09 PROCEDURE — 86140 C-REACTIVE PROTEIN: CPT

## 2019-10-09 PROCEDURE — 86160 COMPLEMENT ANTIGEN: CPT

## 2019-10-09 PROCEDURE — 86235 NUCLEAR ANTIGEN ANTIBODY: CPT

## 2019-10-09 PROCEDURE — 85652 RBC SED RATE AUTOMATED: CPT

## 2019-10-09 PROCEDURE — 87522 HEPATITIS C REVRS TRNSCRPJ: CPT

## 2019-10-09 PROCEDURE — 82550 ASSAY OF CK (CPK): CPT

## 2019-10-09 PROCEDURE — 82570 ASSAY OF URINE CREATININE: CPT | Performed by: INTERNAL MEDICINE

## 2019-10-09 PROCEDURE — 82085 ASSAY OF ALDOLASE: CPT

## 2019-10-09 PROCEDURE — 84165 PROTEIN E-PHORESIS SERUM: CPT

## 2019-10-09 PROCEDURE — 82728 ASSAY OF FERRITIN: CPT

## 2019-10-09 PROCEDURE — 84156 ASSAY OF PROTEIN URINE: CPT | Performed by: INTERNAL MEDICINE

## 2019-10-09 PROCEDURE — 85025 COMPLETE CBC W/AUTO DIFF WBC: CPT

## 2019-10-09 NOTE — TELEPHONE ENCOUNTER
I think she told me she wants the vials    In the past that is what she told me, I will check with her again

## 2019-10-09 NOTE — PATIENT INSTRUCTIONS
1) Please get labs done  2) Please schedule appointment with Hematology  3) Please schedule appointment with Vascular surgery and also for the artery ultrasound  4) Please talk to your primary care doctor about the colonoscopy  Polymyalgia Rheumatica   AMBULATORY CARE:   Polymyalgia rheumatica  is a condition that causes muscle pain and stiffness from inflammation  The symptoms are worst after you have not used the muscles for a period of time  For example, it may be difficult to get out of bed when you wake up in the morning  Polymyalgia rheumatica usually affects people older than 50 years, often after age 79  Polymyalgia rheumatica can occur with a serious condition called giant cell arteritis, or temporal arteritis  This condition causes the walls of arteries to swell  Common signs and symptoms:  Any of the following may develop suddenly or within a few days or weeks:  · Pain, aching, or stiffness in both shoulders, or your neck, upper arms, or hips    · Pain and stiffness that gets better with movement    · Trouble raising your arms higher than your shoulders    · Trouble getting dressed or rising out of a chair    · Less ability to move joints fully    · Fatigue, loss of appetite, or weight loss without trying  Seek care immediately if:   · You have signs or symptoms of giant cell arteritis, such as a headache, jaw pain, vision changes, or a fever  Contact your healthcare provider if:   · You have new or returning signs or symptoms of polymyalgia rheumatica while the steroid medicine is being lowered  · You have questions or concerns about your condition or care  Treatment:  NSAID medicines may help relieve mild pain and stiffness  For more severe pain, steroid medicine will help relieve inflammation that is causing the pain  You may feel relief right away, or it may take several doses before you feel better   Your healthcare provider will increase the dose until your symptoms are controlled  He will then lower the dose over a few days or weeks to find the lowest dose that is effective  You will then continue to take the medicine at that dose, usually for about 6 months  You may need to continue for up to 3 years to control the pain  Manage polymyalgia rheumatica:   · Exercise as directed  Exercise can help prevent or reduce pain  Exercise can also help you keep muscle mass and prevent falls  · Go to physical therapy as directed  A physical therapist can teach you exercises to help keep muscle mass  He can also teach you exercises to improve range of motion in joints that are difficult to move  · Use assistive devices as needed  A raised toilet seat or chair can help you stand more easily  Devices are available to help you reach items on high shelves if you have trouble reaching up  Your healthcare provider may recommend a cane or walker to help you keep your balance  · Eat a variety of healthy foods  Healthy foods include fruits, vegetables, whole-grain breads, low-fat dairy products, beans, lean meats, and fish  Healthy foods can help you have more energy  Ask if you need to be on a special diet  Follow up with your healthcare provider as directed: Your healthcare provider will need to check your symptoms and adjust your steroid dose over time  He will also check for side effects from the medicine, such as a high blood sugar level or fast heart rate  Write down your questions so you remember to ask them during your visits  © 2017 [employer] Information is for End User's use only and may not be sold, redistributed or otherwise used for commercial purposes  All illustrations and images included in CareNotes® are the copyrighted property of [employer]  or [employer]  The above information is an  only  It is not intended as medical advice for individual conditions or treatments   Talk to your doctor, nurse or pharmacist before following any medical regimen to see if it is safe and effective for you  Temporal Arteritis   WHAT YOU NEED TO KNOW:   Temporal arteritis (giant cell arteritis or cranial arteritis) is an inflammation of the lining of your arteries  It most often affects the temporal arteries  Temporal arteries are blood vessels that are located near your temples  Your arteries may become swollen, narrow, and tender  Over time, the swollen and narrowed temporal arteries cause decreased blood flow to the eyes, face, and brain  The lack of oxygen may result in other serious conditions, such as a stroke, heart attack, or blindness  Temporal arteritis may become life-threatening  DISCHARGE INSTRUCTIONS:   Medicines:   · Medicines , such as steroids, will be given to decrease inflammation  Medicines may also be given to help your immune system  · Antiplatelets , such as aspirin, help prevent blood clots  Take your antiplatelet medicine exactly as directed  These medicines make it more likely for you to bleed or bruise  If you are told to take aspirin, do not take acetaminophen or ibuprofen instead  · Vitamin D and calcium  may be given while you are using steroid medicines  These supplements help prevent bone loss  · Take your medicine as directed  Contact your healthcare provider if you think your medicine is not helping or if you have side effects  Tell him or her if you are allergic to any medicine  Keep a list of the medicines, vitamins, and herbs you take  Include the amounts, and when and why you take them  Bring the list or the pill bottles to follow-up visits  Carry your medicine list with you in case of an emergency  Follow up with your healthcare provider as directed:  Write down your questions so you remember to ask them during your visits  Self-care:   · Limit alcohol  Women should limit alcohol to 1 drink a day  Men should limit alcohol to 2 drinks a day   A drink of alcohol is 12 ounces of beer, 5 ounces of wine, or 1½ ounces of liquor  · Exercise  Ask your healthcare provider about the best exercise plan for you  Exercise can help build and strengthen bone  · Do not smoke  If you smoke, it is never too late to quit  Ask for information if you need help quitting  Contact your healthcare provider if:   · You have a fever  · You have chills, a cough, or feel weak and achy  · Your skin is itchy, swollen, or has a rash  · You have questions or concerns about your condition or care  Seek care immediately or call 911 if:   · You have any of the following signs of a heart attack:      ¨ Squeezing, pressure, or pain in your chest that lasts longer than 5 minutes or returns    ¨ Discomfort or pain in your back, neck, jaw, stomach, or arm     ¨ Trouble breathing    ¨ Nausea or vomiting    ¨ Lightheadedness or a sudden cold sweat, especially with chest pain or trouble breathing    · You have any of the following signs of a stroke:      ¨ Numbness or drooping on one side of your face     ¨ Weakness in an arm or leg    ¨ Confusion or difficulty speaking    ¨ Dizziness, a severe headache, or vision loss    · You have sudden vision loss in one or both eyes  · Your signs and symptoms come back or get worse  © 2017 [employer]  Information is for End User's use only and may not be sold, redistributed or otherwise used for commercial purposes  All illustrations and images included in CareNotes® are the copyrighted property of [employer]  or [employer]  The above information is an  only  It is not intended as medical advice for individual conditions or treatments  Talk to your doctor, nurse or pharmacist before following any medical regimen to see if it is safe and effective for you

## 2019-10-09 NOTE — TELEPHONE ENCOUNTER
They were working on making the apt for us so they will reach out to the patient and I will call to follow up with Vascular

## 2019-10-09 NOTE — PROGRESS NOTES
Assessment and Plan:   Ms Maxim Springer is a 58-year-old  female with history significant for insulin-dependent diabetes mellitus, with complications related to retinopathy causing legal blindness bilaterally, asthma, osteoarthritis, microcytic anemia of unclear etiology and morbid obesity, who presents for further evaluation of an episode involving bilateral arm pain/stiffness and swelling, associated with significantly elevated inflammatory markers with a CRP greater than 90 and an ESR of greater than 130  She is referred by Dr Diomedes Rosales for a rheumatology consult  Katia Ireland presents today for further evaluation of elevated inflammatory markers, as well as abrupt onset of symptoms related to bilateral upper extremity pain/stiffness/swelling  Other remarkable features from her review of systems, is new onset of headaches over the past 1 year, for which she has been following with Neurology and is currently diagnosed as migraine headaches  Based on a differential diagnosis from a rheumatological perspective, with the abrupt onset of her symptoms in association with the elevated inflammatory markers, this would be concerning for possibly polymyalgia rheumatica or a vasculitic process such as temporal arteritis given the concomitant headaches  Her review of systems is otherwise unrevealing and does not guide me towards an alternate rheumatological explanation for her presentation  Her physical examination is also unremarkable today  I discussed with her alternate etiologies for the significantly elevated inflammatory markers could stem from a hematological/Oncology diagnosis, and simultaneously while we are performing this workup, I will also refer her to Hematology for further evaluation  She was found to have altered cell counts including leukocytosis, anemia and thrombocytosis, although these appear to be improving on her most recent labs, and may be reactive in nature    Regardless, I think it may be important to understand their perspective on the elevated inflammatory markers  I also advised her as she seems to be up-to-date with preventive cancer screening, except for a colonoscopy and she does have unexplained microcytic anemia, I would strongly recommend she discuss this with her primary care physician and set up for a colonoscopy in the near future  - Reverting back to further rheumatological workup, my main concern at this time is to rule out a vasculitic process  To evaluate this I am sending her for an urgent bilateral temporal artery ultrasound, as well as an appointment with vascular surgery with the intent to proceed with bilateral temporal artery biopsies to assess for vasculitic changes  As she appears to be clinically stable at this time, with significant resolution of the upper extremity pain/stiffness, and stability in her headaches (unfortunately we cannot assess for any vision changes as she is legally blind secondary to diabetic retinopathy), I will hold off on any empiric treatment with steroids  This will also be avoided as she does have insulin-dependent diabetes  Hopefully we can get the workup done first with a plausible diagnosis, prior to considering treatment options  - I will plan to see her back in the office in 3 weeks to review the results of the labs, ultrasound results, and hopefully she has had the ability to establish with vascular surgery and Hematology/Oncology  I requested she call the office in the interim if she has to have any recurrence of symptoms  Plan:  Diagnoses and all orders for this visit:    Elevated sed rate  -     CBC and differential; Future  -     Comprehensive metabolic panel; Future  -     C-reactive protein; Future  -     Sedimentation rate, automated; Future  -     Nuclear antigen antibody; Future  -     Anti-scleroderma antibody; Future  -     Centromere Antibody; Future  -     Anti-Julia 1 Antibody; Future  -     Aldolase;  Future  - Anti-smooth muscle antibody, IgG; Future  -     Antimitochondrial antibody; Future  -     C3 complement; Future  -     C4 complement; Future  -     Protein / creatinine ratio, urine  -     Urinalysis with microscopic  -     Ferritin; Future  -     HIV 1/2 AG-AB combo; Future  -     MISCELLANEOUS LAB TEST; Future  -     Hepatitis C RNA, quantitative, PCR; Future  -     Ambulatory referral to Vascular Surgery; Future  -     Ambulatory referral to Hematology / Oncology; Future  -     VAS temporal artery duplex; Future  -     CK; Future    Elevated C-reactive protein (CRP)    Leukocytosis, unspecified type  -     Ambulatory referral to Hematology / Oncology; Future    Microcytic anemia  -     Ambulatory referral to Hematology / Oncology; Future    Thrombocytosis ([address] )  -     Ambulatory referral to Hematology / Oncology; Future    Class 3 severe obesity with body mass index (BMI) of 45 0 to 49 9 in adult, unspecified obesity type, unspecified whether serious comorbidity present (HCC)    IDDM (insulin dependent diabetes mellitus) (HCC)    Arm swelling    Unintentional weight loss    New onset headache  -     Ambulatory referral to Vascular Surgery; Future  -     VAS temporal artery duplex; Future      I have personally reviewed pertinent films in PACS which does not show any erosive changes of the right hand x-ray, but there is dorsal soft tissue swelling overlying the hand  Activities as tolerated    Diet: low carb/low fat, more greens/vegetables, adequate hydration  Exercise: try to maintain a low impact exercise regimen as much as possible  Walk for 30 minutes a day for at least 3 days a week    Encouraged to maintain good sleep hygiene  Continue other medications as prescribed by PCP and other specialists        RTC in 3 weeks  HPI  Ms Lizbeth Leyva is a 67-year-old [place] American female with history significant for insulin-dependent diabetes mellitus, with complications related to retinopathy causing legal blindness bilaterally, asthma, osteoarthritis, microcytic anemia of unclear etiology and morbid obesity, who presents for further evaluation of an episode involving bilateral arm pain/stiffness and swelling, associated with significantly elevated inflammatory markers with a CRP greater than 90 and an ESR of greater than 130  She is referred by Dr Tomasa Galicia for a rheumatology consult  Patient reports in the first week of September 2019, she woke up one morning with sudden onset of pain and stiffness affecting her bilateral arms, to such an extent that she was unable to move them even slightly  This was also associated with a weakness sensation of her lower legs  As her symptoms did not improve within the week she was seen in the emergency room and admitted for further evaluation  Due to the visible swelling an x-ray of her hand and forearm were initially done which only showed dorsal soft tissue swelling  An upper extremity venous duplex ruled out evidence of a deep vein thrombosis  A CTA of her right upper extremity was essentially unremarkable except for edema noted in the subcutaneous fat of the distal forearm and hand although without a vascular etiology  The aortic arch and distal arteries were unremarkable  Further lab workup showed a leukocytosis of 14, with a chronic microcytic anemia with stable hemoglobin of 9, as well as thrombocytosis with platelets ranging from 500-700  Mild transaminitis was seen which eventually resolved, although she continued to have an elevated alkaline phosphatase which is still persistent  An ESR was found to be elevated at greater than 130, with a C reactive protein of greater than 90  An JUAN screen was positive at 1:80 homogeneous pattern  A hepatitis C antibody was found to be equivocal   An anti cardiolipin IgM antibody was mildly elevated at 13    TSH, blood cultures, uric acid, urinalysis, CK, rheumatoid factor, anti CCP antibody, anti neutrophilic cytoplasmic antibody, lupus anticoagulant, Lyme disease PCR, Sjogren's antibodies, SPEP, total complement, beta 2 glycoprotein antibodies, paraneoplastic profile, anti double-stranded DNA antibody, LDH, beta 2 microglobulin, immunoglobulin assay, and direct Gerri test were unremarkable  She reports during the hospital stay she declined the use of steroids due to the insulin-dependent diabetes, or pain medication use  Her symptoms were managed with Tylenol as needed, as well as therapy  She was also evaluated by vascular surgery during her hospital stay, with the workup being unrevealing  She was discharged home and advised to follow-up with Rheumatology for further evaluation  There was a concern for temporal arteritis versus polymyalgia rheumatica given the abrupt onset of her symptoms as well as the elevated inflammatory markers  She reports the majority of her symptoms have since resolved, and lasted for an entire duration of approximately 2 weeks  She did have follow-up labs done on September 30th which showed a persistently elevated ESR of 121, but with the down trending CRP of 23 2  Patient reports since December 2018 she has been experiencing new onset headaches which were diagnosed as migraine headaches by Neurology  She states that the headache is sometimes felt at the back of her head or over her entire head, but is not usually limited to one side  She was recently seen in the emergency room for the headaches as well and no acute findings were noted on an MRI brain are MRA head  The MRI of her brain did show cerebral atrophy more than expected for the patient's age  There were also changes that could be consistent with mild chronic white matter microangiopathy versus sequelae of chronic migraine disease  A CTA head and neck was also unremarkable  She reports a few months ago her weight was 336 lb, and today she is presenting with a weight of 304 lb    She thinks this may be related to a decreased appetite  When her symptoms started at the beginning of September she did experience jaw pain on the right side which self-resolved  She does report a history of multiple miscarriages  She denies fevers, night sweats, scalp tenderness/pain, new vision changes (she does have a history of retinopathy related to insulin-dependent diabetes mellitus, and states she is legally blind in both eyes), current jaw claudication, hair loss, sicca symptoms, unexplained skin rashes, psoriasis, photosensitivity, mouth/nose ulcers, epistaxis, recurrent sinus disease, swollen glands, pleuritic chest pain, hemoptysis (she does have a chronic history of shortness of breath and cough related to asthma), abdominal pain, vomiting, diarrhea, blood in stools, blood clots, Raynaud's, focal weakness or family history of autoimmune disease  Her sister may have some type of arthritis  She is up-to-date with a mammogram, and states Pap smears were discontinued as she has had a hysterectomy  She has never had a colonoscopy  The following portions of the patient's history were reviewed and updated as appropriate: allergies, current medications, past family history, past medical history, past social history, past surgical history and problem list       Review of Systems  Constitutional: Negative for fevers, chills, night sweats  Positive for fatigue  ENT/Mouth: Negative for hearing changes, ear pain, nasal congestion, sinus pain, hoarseness, sore throat, rhinorrhea, swallowing difficulty  Eyes: Negative for pain, redness, discharge  Positive for vision changes  Cardiovascular: Negative for chest pain, SOB, palpitations  Respiratory: Negative for cough, sputum, wheezing, dyspnea  Gastrointestinal: Negative for nausea, vomiting, diarrhea, constipation, pain, heartburn  Genitourinary: Negative for dysuria, urinary frequency, hematuria  Musculoskeletal: As per HPI  Skin: Negative for skin rash, color changes     Neuro: Negative for tingling, loss of consciousness  Positive for dizziness, headaches, lightheadedness, numbness and weakness  Psych: Negative for anxiety, depression  Heme/Lymph: Negative for easy bruising, bleeding, lymphadenopathy          Past Medical History:   Diagnosis Date    Anemia     Arthritis     Diabetes mellitus (HCC)     Dyspnea on exertion     Hyperlipidemia     Hypertension     Legally blind 2010    Mild intermittent asthma with exacerbation 8/4/2018    Miscarriage     x 3    Seizures (HCC)     Sickle cell trait ([facility] [address] )     Sleep apnea        Past Surgical History:   Procedure Laterality Date    CATARACT EXTRACTION Bilateral     august and september    EYE SURGERY      HYSTERECTOMY      42    OOPHORECTOMY Left     37       Social History     Socioeconomic History    Marital status: /Civil Union     Spouse name: Not on file    Number of children: Not on file    Years of education: Not on file    Highest education level: Not on file   Occupational History    Occupation: employed   Social Needs    Financial resource strain: Not on file    Food insecurity:     Worry: Not on file     Inability: Not on file   Inmagic needs:     Medical: Not on file     Non-medical: Not on file   Tobacco Use    Smoking status: Never Smoker    Smokeless tobacco: Never Used   Substance and Sexual Activity    Alcohol use: Never     Alcohol/week: 0 0 standard drinks     Frequency: Never     Drinks per session: Patient refused     Binge frequency: Never    Drug use: No    Sexual activity: Not Currently     Partners: Male     Birth control/protection: None   Lifestyle    Physical activity:     Days per week: Not on file     Minutes per session: Not on file    Stress: Not on file   Relationships    Social connections:     Talks on phone: Not on file     Gets together: Not on file     Attends Mandaen service: Not on file     Active member of club or organization: Not on file     Attends meetings of clubs or organizations: Not on file     Relationship status: Not on file    Intimate partner violence:     Fear of current or ex partner: Not on file     Emotionally abused: Not on file     Physically abused: Not on file     Forced sexual activity: Not on file   Other Topics Concern    Not on file   Social History Narrative    Caffeine use       Family History   Problem Relation Age of Onset    Heart attack Mother     Heart disease Mother     Hypertension Mother     No Known Problems Father     Heart disease Sister     No Known Problems Brother     No Known Problems Son     No Known Problems Daughter     Heart attack Maternal Grandmother     Heart disease Maternal Grandmother     Diabetes Other     Heart attack Other     No Known Problems Sister     No Known Problems Sister     No Known Problems Paternal Aunt     No Known Problems Son     Cancer Neg Hx     Stroke Neg Hx        No Known Allergies      Current Outpatient Medications:     albuterol (PROVENTIL HFA) 90 mcg/act inhaler, Inhale 2 puffs every 6 (six) hours as needed for wheezing For another 24 hours use every 4 hours standing, Disp: 6 7 g, Rfl: 0    aspirin 81 MG tablet, Take 1 tablet by mouth daily, Disp: , Rfl:     atorvastatin (LIPITOR) 40 mg tablet, Take 1 tablet (40 mg total) by mouth daily, Disp: 90 tablet, Rfl: 1    BASAGLAR KWIKPEN 100 units/mL injection pen, Inject 30 Units under the skin every 12 (twelve) hours, Disp: 5 pen, Rfl: 0    SARAH MICROLET LANCETS lancets, Inject 1 applicator into the skin 4 (four) times a day, Disp: , Rfl:     Blood Glucose Monitoring Suppl (CONTOUR NEXT MONITOR) w/Device KIT, Disp 1 meter dx E11 9, may submitted any contour next meter   If not covered let us know we can change strips, Disp: 1 kit, Rfl: 1    Blood Pressure Monitor KIT, Check blood pressures BID, Disp: 1 each, Rfl: 0    budesonide-formoterol (SYMBICORT) 80-4 5 MCG/ACT inhaler, Inhale 2 puffs 2 (two) times a day Rinse mouth after use  (Patient taking differently: Inhale 2 puffs as needed Rinse mouth after use ), Disp: 1 Inhaler, Rfl: 5    Cholecalciferol (VITAMIN D3) 3000 units TABS, Take by mouth daily , Disp: , Rfl:     CONTOUR NEXT TEST test strip, Test blood sugar 3x per day dx E11 9, Disp: 300 each, Rfl: 1    diltiazem (TIAZAC) 420 MG 24 hr capsule, Take 1 capsule (420 mg total) by mouth daily, Disp: 30 capsule, Rfl: 5    gabapentin (NEURONTIN) 100 mg capsule, Take 1 capsule (100 mg total) by mouth 2 (two) times a day, Disp: 180 capsule, Rfl: 1    insulin lispro (HumaLOG) 100 units/mL injection, Inject under the skin 3 (three) times a day before meals 10 U before breakfast, 10 U before lunch, and 14 U before dinner, Disp: , Rfl:     Insulin Pen Needle (BD PEN NEEDLE DERRICK U/F) 32G X 4 MM MISC, Inject 1 applicator under the skin 4 (four) times a day, Disp: , Rfl:     losartan (COZAAR) 100 MG tablet, Take 1 tablet (100 mg total) by mouth daily, Disp: 30 tablet, Rfl: 5      Objective:    Vitals:    10/09/19 1059   BP: 138/72   Pulse: 89   Weight: (!) 138 kg (304 lb 3 2 oz)       Physical Exam  General: Well appearing, well nourished, in no distress  Oriented x 3, normal mood and affect  Ambulating without difficulty  Obese  Skin: Good turgor, no rash, unusual bruising or prominent lesions  Slight hyperpigmentation noted over her anterior chest   No psoriasis  Nails: Normal color, no deformities  There is no pitting or dilated nail fold capillaries noted  HEENT:  Head: Normocephalic, atraumatic  No scalp tenderness  Nontender temporal arteries bilaterally with good pulsations  Eyes: Conjunctiva clear, sclera non-icteric, EOM intact  She is legally blind  Nose: No external lesions, mucosa non-inflamed  Mouth: Mucous membranes moist, no mucosal lesions  Neck: Supple, thyroid non-enlarged and non-tender  No lymphadenopathy  Heart: Regular rate and rhythm, no murmur or gallop    Lungs: Clear to auscultation, no crackles or wheezing  Abdomen: Soft, non-tender, non-distended, bowel sounds normal    Extremities: No amputations or deformities, cyanosis, edema  Musculoskeletal:   Hands - there does not appear to be any obvious soft tissue swelling of her bilateral hands or synovitis appreciated  She does have mild tenderness to various PIP joints bilaterally  Wrists, elbows and shoulders - there is no joint soft tissue swelling or tenderness noted  Knees, ankles and feet - there is no joint soft tissue swelling or tenderness noted  There is no soft tissue swelling appreciated of her extremities  Neurologic: Alert and oriented  No focal neurological deficits appreciated  She does have 5/5 strength in her bilateral upper extremities, proximally and distally, as well as in her bilateral lower extremities proximally  Psychiatric: Normal mood and affect  DOC Vaughan    Rheumatology

## 2019-10-09 NOTE — TELEPHONE ENCOUNTER
Please let them know I would like an urgent appointment with the physician even before the ultrasound is done  Thanks

## 2019-10-10 ENCOUNTER — TELEPHONE (OUTPATIENT)
Dept: ENDOCRINOLOGY | Facility: CLINIC | Age: 59
End: 2019-10-10

## 2019-10-10 ENCOUNTER — TELEPHONE (OUTPATIENT)
Dept: SURGICAL ONCOLOGY | Facility: CLINIC | Age: 59
End: 2019-10-10

## 2019-10-10 DIAGNOSIS — IMO0002 UNCONTROLLED TYPE 2 DIABETES MELLITUS WITH OPHTHALMIC COMPLICATION, WITH LONG-TERM CURRENT USE OF INSULIN: Primary | ICD-10-CM

## 2019-10-10 LAB
ACTIN IGG SERPL-ACNC: 25 UNITS (ref 0–19)
ALDOLASE SERPL-CCNC: 3.6 U/L (ref 3.3–10.3)
CENTROMERE B AB SER-ACNC: <0.2 AI (ref 0–0.9)
ENA JO1 AB SER-ACNC: <0.2 AI (ref 0–0.9)
ENA RNP AB SER-ACNC: <0.2 AI (ref 0–0.9)
ENA SCL70 AB SER-ACNC: <0.2 AI (ref 0–0.9)
ENA SM AB SER-ACNC: <0.2 AI (ref 0–0.9)
HIV 1+2 AB+HIV1 P24 AG SERPL QL IA: NORMAL
MITOCHONDRIA M2 IGG SER-ACNC: <20 UNITS (ref 0–20)

## 2019-10-10 RX ORDER — INSULIN LISPRO 100 [IU]/ML
INJECTION, SOLUTION INTRAVENOUS; SUBCUTANEOUS
Qty: 10 PEN | Refills: 3 | Status: SHIPPED | OUTPATIENT
Start: 2019-10-10 | End: 2020-01-07 | Stop reason: SDUPTHER

## 2019-10-10 NOTE — TELEPHONE ENCOUNTER
New Patient Encounter    New Patient Intake Form   Patient Details:  Juarez Dumas  1960  [id number]    Background Information:  [address] starts by opening a telephone encounter and gathering the following information   Who is calling to schedule? If not self, relationship to patient? Self   Referring Provider Felix Herndon    What is the diagnosis? Elevated sed/leukocytosis/elevated c-reactive/anemia and thrombocytosis   When was the diagnosis? 10/9   Is patient aware of diagnosis? Yes   Reason for visit? NP DX   Have you had any testing done? If so: when, where? Yes   Are records in [place]? yes   Was the patient told to bring a disk? no   Scheduling Information:   Preferred [facility]:  Any     Requesting Specific Provider? no   no no   Counseling Pre-Screen:  If the patient answers YES to any of the below questions, please route to the appropriate location specific counselor    Have you felt anxious or worried about cancer and the treatment you are receiving? No   Has your diagnosis caused physical, emotional, or financial hardship for you? No   Note: Do not ask the patient about transportation issues/needs  Please notate if the patient brings it up and the counselor will schedule accordingly  Miscellaneous: na    After completing the above information, please route to Financial Counselor and the appropriate Nurse Navigator for review

## 2019-10-11 LAB
ALBUMIN SERPL ELPH-MCNC: 3.17 G/DL (ref 3.5–5)
ALBUMIN SERPL ELPH-MCNC: 44.6 % (ref 52–65)
ALPHA1 GLOB SERPL ELPH-MCNC: 0.44 G/DL (ref 0.1–0.4)
ALPHA1 GLOB SERPL ELPH-MCNC: 6.2 % (ref 2.5–5)
ALPHA2 GLOB SERPL ELPH-MCNC: 1.26 G/DL (ref 0.4–1.2)
ALPHA2 GLOB SERPL ELPH-MCNC: 17.7 % (ref 7–13)
BETA GLOB ABNORMAL SERPL ELPH-MCNC: 0.4 G/DL (ref 0.4–0.8)
BETA1 GLOB SERPL ELPH-MCNC: 5.7 % (ref 5–13)
BETA2 GLOB SERPL ELPH-MCNC: 6.4 % (ref 2–8)
BETA2+GAMMA GLOB SERPL ELPH-MCNC: 0.45 G/DL (ref 0.2–0.5)
GAMMA GLOB ABNORMAL SERPL ELPH-MCNC: 1.38 G/DL (ref 0.5–1.6)
GAMMA GLOB SERPL ELPH-MCNC: 19.4 % (ref 12–22)
HCV RNA SERPL NAA+PROBE-ACNC: NORMAL IU/ML
IGG/ALB SER: 0.81 {RATIO} (ref 1.1–1.8)
PROT PATTERN SERPL ELPH-IMP: ABNORMAL
PROT SERPL-MCNC: 7.1 G/DL (ref 6.4–8.2)
TEST INFORMATION: NORMAL

## 2019-10-11 NOTE — TELEPHONE ENCOUNTER
Patient called me back left her know what will happen after the duplex Patient confirmed the instructions,LLF

## 2019-10-14 ENCOUNTER — OFFICE VISIT (OUTPATIENT)
Dept: ENDOCRINOLOGY | Facility: CLINIC | Age: 59
End: 2019-10-14
Payer: COMMERCIAL

## 2019-10-14 ENCOUNTER — HOSPITAL ENCOUNTER (OUTPATIENT)
Dept: NON INVASIVE DIAGNOSTICS | Facility: HOSPITAL | Age: 59
Discharge: HOME | End: 2019-10-14
Payer: COMMERCIAL

## 2019-10-14 VITALS
DIASTOLIC BLOOD PRESSURE: 82 MMHG | HEIGHT: 68 IN | WEIGHT: 293 LBS | SYSTOLIC BLOOD PRESSURE: 140 MMHG | BODY MASS INDEX: 44.41 KG/M2 | HEART RATE: 94 BPM

## 2019-10-14 DIAGNOSIS — E55.9 VITAMIN D DEFICIENCY: ICD-10-CM

## 2019-10-14 DIAGNOSIS — R70.0 ELEVATED SED RATE: ICD-10-CM

## 2019-10-14 DIAGNOSIS — IMO0002 UNCONTROLLED TYPE 2 DIABETES MELLITUS WITH OPHTHALMIC COMPLICATION, WITH LONG-TERM CURRENT USE OF INSULIN: Primary | ICD-10-CM

## 2019-10-14 DIAGNOSIS — R79.82 ELEVATED C-REACTIVE PROTEIN (CRP): ICD-10-CM

## 2019-10-14 DIAGNOSIS — R80.9 MICROALBUMINURIA: ICD-10-CM

## 2019-10-14 DIAGNOSIS — E78.5 HYPERLIPIDEMIA, UNSPECIFIED HYPERLIPIDEMIA TYPE: ICD-10-CM

## 2019-10-14 DIAGNOSIS — R51.9 NEW ONSET HEADACHE: ICD-10-CM

## 2019-10-14 DIAGNOSIS — I10 ESSENTIAL HYPERTENSION: ICD-10-CM

## 2019-10-14 PROCEDURE — 99214 OFFICE O/P EST MOD 30 MIN: CPT | Performed by: PHYSICIAN ASSISTANT

## 2019-10-14 PROCEDURE — 93882 EXTRACRANIAL UNI/LTD STUDY: CPT

## 2019-10-14 PROCEDURE — 93886 INTRACRANIAL COMPLETE STUDY: CPT | Performed by: SURGERY

## 2019-10-14 NOTE — ASSESSMENT & PLAN NOTE
Severely uncontrolled based on last A1C of 14 6 however improving significantly since hospital discharge with average glucose 172 over past two weeks ranging from   She reports blood sugars go up if she skips a meal due to not being able to eat for various reasons  Advised her to use sliding scale humalog if needed for hyperglycemia when not eating and be sure to keep well hydrated  Send log in two weeks and will make adjustments If needed

## 2019-10-14 NOTE — PROGRESS NOTES
Established Patient Progress Note      Chief Complaint   Patient presents with    Diabetes Type 2        History of Present Illness:   Ho Garcia is a 62 y o  female with a history of type 2 diabetes with long term use of insulin since  Reports complications of retinopathy, neuropathy, and nephropathy  Denies recent severe hypoglycemic or severe hyperglycemic episodes  Denies any issues with her current regimen  home glucose monitoring: are performed regularly on average 1 8x per day  Meter downloaded and visit average glucose 172 range   Blood sugars much better since she came home from hospital   Taking insulin on regular basis  Sometimes skips a meal due to not having money/food/transportation  She is trying to get set up with [employer]         Current regimen:   Basaglar 30 units twice per day  Humalog 10-10-14 before meals plus sliding scale 2 units per 50 above 150  `    Last Eye Exam: UTD  Last Foot Exam: UTD    Has hypertension: Taking diltiazem 420mg daily and losartan 100mg daily   Has hyperlipidemia: Taking atorvastatin 40mg        Patient Active Problem List   Diagnosis    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, with long-term current use of insulin ([address] )    Essential hypertension    Seizures (HCC)    Exertional dyspnea    Enlarged pulmonary artery (HCC)    Aortic dilatation (HCC)    Anemia    Decreased pulses in feet    Diabetes mellitus type 2 with complications, uncontrolled ([address] )    Hyperlipidemia    Microalbuminuria    Mild obstructive sleep apnea    Class 3 severe obesity with serious comorbidity and body mass index (BMI) of 45 0 to 49 9 in adult (HCC)    Peripheral neuropathy    Prolonged QT interval    Visual impairment in both eyes    Vitamin D deficiency    Lung nodules    Orthostatic hypotension    Mild intermittent asthma with exacerbation    Type 2 diabetes mellitus with microalbuminuria, with long-term current use of insulin ([facility] )    Frequent headaches    Other inflammatory and immune myopathies, not elsewhere classified    Transaminitis    Morbid obesity ([facility] )      Past Medical History:   Diagnosis Date    Anemia     Arthritis     Diabetes mellitus ([facility] )     Dyspnea on exertion     Hyperlipidemia     Hypertension     Legally blind 2010    Mild intermittent asthma with exacerbation 8/4/2018    Miscarriage     x 3    Seizures (HCC)     Sickle cell trait ([facility] )     Sleep apnea       Past Surgical History:   Procedure Laterality Date    CATARACT EXTRACTION Bilateral     august and september    EYE SURGERY      HYSTERECTOMY      42    OOPHORECTOMY Left     42      Family History   Problem Relation Age of Onset    Heart attack Mother     Heart disease Mother     Hypertension Mother     No Known Problems Father     Heart disease Sister     No Known Problems Brother     No Known Problems Son     No Known Problems Daughter     Heart attack Maternal Grandmother     Heart disease Maternal Grandmother     Diabetes Other     Heart attack Other     No Known Problems Sister     No Known Problems Sister     No Known Problems Paternal Aunt     No Known Problems Son     Cancer Neg Hx     Stroke Neg Hx      Social History     Tobacco Use    Smoking status: Never Smoker    Smokeless tobacco: Never Used   Substance Use Topics    Alcohol use: Never     Alcohol/week: 0 0 standard drinks     Frequency: Never     Drinks per session: Patient refused     Binge frequency: Never     No Known Allergies      Current Outpatient Medications:     albuterol (PROVENTIL HFA) 90 mcg/act inhaler, Inhale 2 puffs every 6 (six) hours as needed for wheezing For another 24 hours use every 4 hours standing, Disp: 6 7 g, Rfl: 0    aspirin 81 MG tablet, Take 1 tablet by mouth daily, Disp: , Rfl:     atorvastatin (LIPITOR) 40 mg tablet, Take 1 tablet (40 mg total) by mouth daily, Disp: 90 tablet, Rfl: 1    BASAGLAR KWIKPEN 100 units/mL injection pen, Inject 30 Units under the skin every 12 (twelve) hours, Disp: 5 pen, Rfl: 0    SARAH MICROLET LANCETS lancets, Inject 1 applicator into the skin 4 (four) times a day, Disp: , Rfl:     Blood Glucose Monitoring Suppl (CONTOUR NEXT MONITOR) w/Device KIT, Disp 1 meter dx E11 9, may submitted any contour next meter  If not covered let us know we can change strips, Disp: 1 kit, Rfl: 1    Blood Pressure Monitor KIT, Check blood pressures BID, Disp: 1 each, Rfl: 0    budesonide-formoterol (SYMBICORT) 80-4 5 MCG/ACT inhaler, Inhale 2 puffs 2 (two) times a day Rinse mouth after use  (Patient taking differently: Inhale 2 puffs as needed Rinse mouth after use ), Disp: 1 Inhaler, Rfl: 5    Cholecalciferol (VITAMIN D3) 3000 units TABS, Take by mouth daily , Disp: , Rfl:     CONTOUR NEXT TEST test strip, Test blood sugar 3x per day dx E11 9, Disp: 300 each, Rfl: 1    diltiazem (TIAZAC) 420 MG 24 hr capsule, Take 1 capsule (420 mg total) by mouth daily, Disp: 30 capsule, Rfl: 5    gabapentin (NEURONTIN) 100 mg capsule, Take 1 capsule (100 mg total) by mouth 2 (two) times a day, Disp: 180 capsule, Rfl: 1    HUMALOG KWIKPEN 100 units/mL injection pen, 10 U before breakfast, 10 U before lunch, and 14 U before dinner, Disp: 10 pen, Rfl: 3    Insulin Pen Needle (BD PEN NEEDLE DERRICK U/F) 32G X 4 MM MISC, Inject 1 applicator under the skin 4 (four) times a day, Disp: , Rfl:     losartan (COZAAR) 100 MG tablet, Take 1 tablet (100 mg total) by mouth daily, Disp: 30 tablet, Rfl: 5    Review of Systems   Constitutional: Positive for fatigue  Negative for activity change, appetite change, chills, diaphoresis, fever and unexpected weight change  HENT: Negative for trouble swallowing and voice change  Eyes: Negative for visual disturbance  Respiratory: Negative for shortness of breath  Cardiovascular: Negative for chest pain and palpitations     Gastrointestinal: Negative for abdominal pain, constipation and diarrhea  Endocrine: Negative for cold intolerance, heat intolerance, polydipsia, polyphagia and polyuria  Genitourinary: Negative for frequency and menstrual problem  Musculoskeletal: Positive for arthralgias and myalgias  Skin: Negative for rash  Allergic/Immunologic: Negative for food allergies  Neurological: Positive for numbness and headaches  Negative for dizziness and tremors  Hematological: Negative for adenopathy  Psychiatric/Behavioral: Negative for sleep disturbance  All other systems reviewed and are negative  Physical Exam:  Body mass index is 48 05 kg/m²  /82   Pulse 94   Ht 5' 8" (1 727 m)   Wt (!) 143 kg (316 lb)   BMI 48 05 kg/m²    Wt Readings from Last 3 Encounters:   10/14/19 (!) 143 kg (316 lb)   10/09/19 (!) 138 kg (304 lb 3 2 oz)   10/08/19 (!) 138 kg (304 lb 6 4 oz)       Physical Exam   Constitutional: She is oriented to person, place, and time  She appears well-developed and well-nourished  No distress  HENT:   Head: Normocephalic and atraumatic  Eyes: Pupils are equal, round, and reactive to light  Conjunctivae are normal    Neck: Normal range of motion  Neck supple  No thyromegaly present  Cardiovascular: Normal rate, regular rhythm and normal heart sounds  Pulmonary/Chest: Effort normal and breath sounds normal  No respiratory distress  She has no wheezes  She has no rales  Abdominal: Soft  Bowel sounds are normal  She exhibits no distension  There is no tenderness  Musculoskeletal: Normal range of motion  She exhibits no edema  Neurological: She is alert and oriented to person, place, and time  Skin: Skin is warm and dry  Psychiatric: She has a normal mood and affect  Vitals reviewed        Labs:   Lab Results   Component Value Date    HGBA1C 14 6 (H) 08/22/2019    HGBA1C 9 9 (A) 04/03/2019    HGBA1C 10 7 (H) 01/03/2019     Lab Results   Component Value Date    CREATININE 1 07 10/09/2019    CREATININE 0 77 09/23/2019    CREATININE 0 70 09/22/2019    BUN 19 10/09/2019     01/04/2016    K 4 0 10/09/2019     10/09/2019    CO2 28 10/09/2019     eGFR   Date Value Ref Range Status   10/09/2019 65 ml/min/1 73sq m Final     Lab Results   Component Value Date    CHOL 130 10/08/2015    HDL 37 (L) 08/24/2019    TRIG 124 08/24/2019     Lab Results   Component Value Date    ALT 15 10/09/2019    AST 10 10/09/2019    ALKPHOS 213 (H) 10/09/2019    BILITOT 0 69 01/04/2016     Lab Results   Component Value Date    OLQ4BSFZERFU 1 710 09/16/2019    ZWK7DCESBQFN 1 680 01/03/2019    MCU9BMVUPDED 1 680 06/24/2018     Lab Results   Component Value Date    FREET4 1 04 01/03/2019       Impression & Plan:    Problem List Items Addressed This Visit        Endocrine    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, with long-term current use of insulin ([facility] ) - Primary    Relevant Orders    Hemoglobin A1C          Orders Placed This Encounter   Procedures    Hemoglobin A1C     Standing Status:   Future     Standing Expiration Date:   4/14/2020       There are no Patient Instructions on file for this visit  Discussed with the patient and all questioned fully answered  She will call me if any problems arise  Follow-up appointment in 3 months       Counseled patient on diagnostic results, prognosis, risk and benefit of treatment options, instruction for management, importance of treatment compliance, Risk  factor reduction and impressions    Nelli Owusu PA-C

## 2019-10-14 NOTE — ASSESSMENT & PLAN NOTE
Not at goal today but was 120s-130s over 72-80 and most recent medical visits  Continue current regimen and will monitor

## 2019-10-15 ENCOUNTER — TELEPHONE (OUTPATIENT)
Dept: VASCULAR SURGERY | Facility: CLINIC | Age: 59
End: 2019-10-15

## 2019-10-15 LAB
INTERPRETATION UR IFE-IMP: NORMAL
LEFT EYE DIABETIC RETINOPATHY: NORMAL
RIGHT EYE DIABETIC RETINOPATHY: NORMAL

## 2019-10-15 NOTE — TELEPHONE ENCOUNTER
Spoke to patient to schedule Biopsy for 10-17-19 with Dr Valentino Bowser Patient was told nothing to eat or drink after midnight on 10-16-19  Patient had blood work and H&P   Patient confirmed and understood the instructions   LLF

## 2019-10-17 ENCOUNTER — HOSPITAL ENCOUNTER (OUTPATIENT)
Facility: HOSPITAL | Age: 59
Setting detail: OUTPATIENT SURGERY
Discharge: HOME | End: 2019-10-17
Attending: SURGERY | Admitting: SURGERY
Payer: COMMERCIAL

## 2019-10-17 VITALS
DIASTOLIC BLOOD PRESSURE: 91 MMHG | BODY MASS INDEX: 44.41 KG/M2 | HEART RATE: 90 BPM | RESPIRATION RATE: 90 BRPM | HEIGHT: 68 IN | SYSTOLIC BLOOD PRESSURE: 161 MMHG | OXYGEN SATURATION: 100 % | TEMPERATURE: 98.9 F | WEIGHT: 293 LBS

## 2019-10-17 DIAGNOSIS — M31.6 GIANT CELL ARTERITIS (HCC): Primary | ICD-10-CM

## 2019-10-17 LAB — GLUCOSE SERPL-MCNC: 233 MG/DL (ref 65–140)

## 2019-10-17 PROCEDURE — 82948 REAGENT STRIP/BLOOD GLUCOSE: CPT

## 2019-10-17 PROCEDURE — 88305 TISSUE EXAM BY PATHOLOGIST: CPT | Performed by: PATHOLOGY

## 2019-10-17 PROCEDURE — 37609 LIGATION/BX TEMPORAL ARTERY: CPT | Performed by: SURGERY

## 2019-10-17 RX ORDER — LIDOCAINE HYDROCHLORIDE AND EPINEPHRINE 10; 10 MG/ML; UG/ML
INJECTION, SOLUTION INFILTRATION; PERINEURAL AS NEEDED
Status: DISCONTINUED | OUTPATIENT
Start: 2019-10-17 | End: 2019-10-17 | Stop reason: HOSPADM

## 2019-10-17 RX ORDER — OXYCODONE HYDROCHLORIDE AND ACETAMINOPHEN 5; 325 MG/1; MG/1
1 TABLET ORAL EVERY 6 HOURS PRN
Qty: 6 TABLET | Refills: 0 | Status: SHIPPED | OUTPATIENT
Start: 2019-10-17 | End: 2019-10-27

## 2019-10-17 NOTE — INTERVAL H&P NOTE
H&P reviewed  After examining the patient I find no changes in the patients condition since the H&P had been written  Vitals:    10/17/19 1155   BP: 145/75   Pulse: 90   Resp: 20   Temp: 98 5 °F (36 9 °C)   SpO2: 98%     Plan: Bilateral temporal artery biopsy  Procedure, risks, benefits, alternatives, and anticipated postoperative course discussed in detail  All questions answered to his/her satisfaction  Patient was agreeable to proceed  Written consent was obtained

## 2019-10-17 NOTE — DISCHARGE INSTRUCTIONS
DISCHARGE INSTRUCTIONS  TEMPORAL ARTERY BIOPSY    Following discharge from the hospital, you may have some questions about your procedure, your activities, or your general condition  These instructions may answer some of your questions however, if you have any additional questions that are not addressed below, please feel free to contact the office  ACTIVITY:   Resume your normal activities and exercise schedule as tolerated  If you were driving prior to the procedure, you may resume driving following discharge from the hospital     DIET:  Resume your normal diet  Try to eat low fat and low cholesterol foods  INCISION: Your physician may have chosen to use a type of adhesive glue, to close your incision  There are stitches present under the skin which will absorb on their own  The glue is used to cover the incision, assist in closure, and prevent contamination  This adhesive will darken and peel away on its own within one to two weeks  It is normal to have some bruising, swelling, or mild discoloration around the incision  If increasing redness, pain, or drainage develop please contact our office for evaluation  FOLLOW UP STUDIES:  You should follow up with your PCP within 1-2 weeks for the results of the biopsy and to discuss further treatment recommendations  PLEASE CALL THE OFFICE IF YOU HAVE ANY QUESTIONS  [phone] [facility] [phone] [facility] FREE [phone]  [address] [facility] , [address], [place], [address]  [address], [address], [facility], [address]  [address], [facility], [address]  [address], [place], [address]  [address], [address], [facility], [address], [address] [facility], [address] [facility], [facility] [address]   [address] [facility], [address], [place], [address]  [address], [address] [facility], [address] [facility], [address], [address] [facility], [place] [address]

## 2019-10-17 NOTE — OP NOTE
OPERATIVE REPORT  PATIENT NAME: Leanne Minaya    :  1960  MRN: [id number]  Pt Location: BE OR ROOM 04    SURGERY DATE: 10/17/2019    Surgeon(s) and Role:     * Bentley Reyes DO - Primary     * Peter Alex MD - Assisting    Preop Diagnosis:  Giant cell arteritis ([address] ) [M31 6]    Post-Op Diagnosis Codes:     * Giant cell arteritis ([address] ) [M31 6]    Procedure(s) (LRB):  BIOPSY ARTERY TEMPORAL (Bilateral)    Specimen(s):  ID Type Source Tests Collected by Time Destination   1 : LEFT TEMPORAL Tissue Artery TISSUE EXAM Bentley Reyes, DO 10/17/2019 1259    2 : RIGHT TEMPORAL Tissue Artery TISSUE EXAM Bentley Reyes, DO 10/17/2019 1259        Estimated Blood Loss:   Minimal    Drains:  * No LDAs found *    Anesthesia Type:   Local    Operative Indications:  Giant cell arteritis ([address] ) [M31 6]  Delroy Hart is a 33-year-old woman with multiple comorbidities who presented to her primary care physician complain of longstanding headache and bilateral upper extremity stiffness  She had elevated inflammatory markers (sed rate, CRP)  There was concern for polymyalgia rheumatica versus giant cell arteritis  A vascular consultation was requested for temporal artery biopsy  Patient was seen and examined in the preop holding area  The procedure, risks, benefits, alternatives and anticipated postop course discussed in detail  Patient was agreeable to proceed  Written consent was obtained  Patient brought to the operating room for the above-mentioned procedure  Operative Findings:  Grossly normal appearing segments of superficial temporal arteries bilaterally    Complications:   None    Procedure and Technique:  Patient was identified in the preop holding area  Patient's name and nature procedure verified  After discussing the procedure, risks, benefits, alternatives and anticipated postop course were and consent obtained  Patient brought to the operating room where she was positioned supine on the OR table  The left temporal region was prepped using Betadine and draped in usual sterile fashion  A formal time-out was performed and all were in agreement  Local anesthetic was infiltrated to skin and subcutaneous tissue over the proposed incision site  A small vertical incision was carried out anterior to the tragus  The incision was carried down through the subcutaneous tissue using the electrocautery  Self-retaining retractor was positioned to facilitate exposure  Combination of Blunt and sharp dissection using mosquito clamp/tenotomy scissors was carried out down to the visibly pulsatile artery  The artery was confirmed with pencil Doppler interrogation  The vessel was mobilized over approximately 3-4 cm segment  The vessel was hemoclipped proximally, distally and transected  This was sent as routine specimen  "left superficial temporal artery  Hemostasis was secured  The incision was closed using Monocryl suture  Skin glue was applied to the incision site as well as a Steri-Strips  We now turned our attention to the right side  The right temporal region was prepped and draped in the usual fashion  Local anesthetic was infiltrated to skin and subcutaneous tissue over the proposed skin incision site  A vertical incision was carried out using a #15  Scalpel  Dissection was carried down through the subcutaneous tissue  Self-retaining retractor was  Positioned to facilitate exposure     The artery was identified, mobilized and isolated over approximately 3-4 cm segment  Confirmation of the artery was noted using pencil Doppler interrogation  The proximal and distal ends of the mobilized segment were ligated using silk ties and transected  This was sent as routine specimen right superficial temporal artery"  The wound was irrigated and hemostasis secured  The incision was closed using Monocryl suture  Skin glue was applied to the incision site as well as a Steri-Strip    Patient tolerated procedure well   She was transferred to recovery room in stable condition     I was present for the entire procedure    Patient Disposition:  APU    SIGNATURE: Andree Zapata,   DATE: October 17, 2019  TIME: 1:48 PM

## 2019-10-20 DIAGNOSIS — I10 ESSENTIAL HYPERTENSION: ICD-10-CM

## 2019-10-22 RX ORDER — LOSARTAN POTASSIUM 100 MG/1
TABLET ORAL
Qty: 30 TABLET | Refills: 5 | Status: SHIPPED | OUTPATIENT
Start: 2019-10-22 | End: 2020-05-21 | Stop reason: SDUPTHER

## 2019-10-24 LAB
LEFT EYE DIABETIC RETINOPATHY: NORMAL
RIGHT EYE DIABETIC RETINOPATHY: NORMAL

## 2019-10-25 ENCOUNTER — TELEPHONE (OUTPATIENT)
Dept: OBGYN CLINIC | Facility: HOSPITAL | Age: 59
End: 2019-10-25

## 2019-10-25 NOTE — TELEPHONE ENCOUNTER
Please let her know the biopsy was normal  I do not recall receiving any disability paperwork, please inform her of this  Thanks

## 2019-10-25 NOTE — TELEPHONE ENCOUNTER
Patient is calling for her biopsy results from 10/14/19  Also patient states her employer faxed over disability paperwork on 10/11/19 and stating they did not receive the paperwork back yet  They currently have her status as not on disability, and she's not getting paid and it is hurting her bad right now  Please advise      Callback #[phone]

## 2019-10-28 NOTE — TELEPHONE ENCOUNTER
Please relay the message below to her and let her know we did call on Friday but she did not answer and a voicemail was left  Thanks

## 2019-10-30 NOTE — TELEPHONE ENCOUNTER
Patient called in regarding her disability paperwork  I called the office to confirm if the paperwork from her insurance was received, no one can confirm this information for me  Advised the patient to give me a few minutes to try and track the paperwork and I will call her back    Patient confirmed the faxed number of             [phone]

## 2019-11-06 ENCOUNTER — OFFICE VISIT (OUTPATIENT)
Dept: RHEUMATOLOGY | Facility: CLINIC | Age: 59
End: 2019-11-06
Payer: COMMERCIAL

## 2019-11-06 VITALS
BODY MASS INDEX: 44.41 KG/M2 | HEIGHT: 68 IN | HEART RATE: 105 BPM | DIASTOLIC BLOOD PRESSURE: 86 MMHG | WEIGHT: 293 LBS | SYSTOLIC BLOOD PRESSURE: 152 MMHG

## 2019-11-06 DIAGNOSIS — R51.9 CHRONIC NONINTRACTABLE HEADACHE, UNSPECIFIED HEADACHE TYPE: ICD-10-CM

## 2019-11-06 DIAGNOSIS — R70.0 ELEVATED SED RATE (ELEV SR): ICD-10-CM

## 2019-11-06 DIAGNOSIS — M35.3 PMR (POLYMYALGIA RHEUMATICA) (HCC): Primary | ICD-10-CM

## 2019-11-06 DIAGNOSIS — E66.01 CLASS 3 SEVERE OBESITY WITHOUT SERIOUS COMORBIDITY WITH BODY MASS INDEX (BMI) OF 45.0 TO 49.9 IN ADULT, UNSPECIFIED OBESITY TYPE (HCC): ICD-10-CM

## 2019-11-06 DIAGNOSIS — G89.29 CHRONIC NONINTRACTABLE HEADACHE, UNSPECIFIED HEADACHE TYPE: ICD-10-CM

## 2019-11-06 DIAGNOSIS — R79.82 ELEVATED C-REACTIVE PROTEIN (CRP): ICD-10-CM

## 2019-11-06 DIAGNOSIS — R74.8 ELEVATED ALKALINE PHOSPHATASE LEVEL: ICD-10-CM

## 2019-11-06 DIAGNOSIS — R76.0 ABNORMAL ANTIBODY TITER: ICD-10-CM

## 2019-11-06 PROCEDURE — 99214 OFFICE O/P EST MOD 30 MIN: CPT | Performed by: INTERNAL MEDICINE

## 2019-11-06 RX ORDER — PREDNISONE 20 MG/1
20 TABLET ORAL DAILY
Qty: 60 TABLET | Refills: 0 | Status: SHIPPED | OUTPATIENT
Start: 2019-11-06 | End: 2019-12-04

## 2019-11-06 NOTE — PATIENT INSTRUCTIONS
1) Please have labs done  2) Please schedule appointments with Neurology and Gastroenterology  3) Please continue checking your blood sugars 4 times daily  4) Please call me in 2 weeks to let me know how you are doing on the prednisone

## 2019-11-06 NOTE — PROGRESS NOTES
Assessment and Plan:   Ms Latisha Vuong is a 68-year-old  female with history significant for insulin-dependent diabetes mellitus, with complications related to retinopathy causing legal blindness bilaterally, asthma, osteoarthritis, microcytic anemia of unclear etiology and morbid obesity, who presents for follow up of an episode involving bilateral arm pain/stiffness and swelling, associated with significantly elevated inflammatory markers with a CRP greater than 90 and an ESR of greater than 130  # Polymyalgia rheumatica  - Se Pulido presents today for follow-up of significantly elevated inflammatory markers, as well as abrupt onset of symptoms related to bilateral upper extremity pain/stiffness/swelling, with mild symptoms of pain noted in her pelvic girdle region  Other than persistent headaches also experienced over the past 1 year, she does not complain of additional concerning symptoms on her review of systems  Her physical examination has also not been revealing  We reviewed the workup she had following the prior office visit, which again showed the significant elevation in her inflammatory markers, although they were down trending  Her serologies were otherwise unrevealing, as well as a bilateral temporal artery ultrasound and temporal artery biopsy  She has also previously undergone a CT of her chest, abdomen and pelvis which was unrevealing   She is due for a colonoscopy, and in view of this as well as a mildly elevated anti smooth muscle antibody with elevated alkaline phosphatase levels, I have referred her to Gastroenterology for further evaluation  A referral to Hematology is also pending to evaluate the elevated inflammatory markers      - Based on her current presentation with pain/stiffness involving her shoulder and hip girdle regions, associated with a significant elevation in inflammatory markers, with the workup otherwise being unrevealing I will proceed with treatment for polymyalgia rheumatica  I did discuss this with her in depth, and will start her on oral prednisone at 20 mg once daily for now  I would like her to call the office in 2 weeks to report her symptoms, and if she does respond well to this low dose of prednisone, it would also aid in a therapeutic diagnosis of polymyalgia rheumatica  I advised her the steroids can increase her blood sugars, and I would like her to continue checking them 4 times daily  If they are elevated I would like her to contact her endocrinologist for adjustments in the insulin dosing     - As she has also been experiencing significant headaches, I will refer her to Neurology for further evaluation     - At this time I will repeat her ESR and CRP to monitor the trend  I would like her to have this done prior to starting the steroids  - I will see her back in the office in 5 weeks to review her response to the prednisone, but requested her to contact the office in approximately 2 weeks  Plan:  Diagnoses and all orders for this visit:    PMR (polymyalgia rheumatica) (HCC)  -     C-reactive protein; Future  -     Sedimentation rate, automated; Future  -     predniSONE 20 mg tablet; Take 1 tablet (20 mg total) by mouth daily    Elevated sed rate (elev SR)  -     C-reactive protein; Future  -     Sedimentation rate, automated; Future    Elevated C-reactive protein (CRP)  -     C-reactive protein; Future  -     Sedimentation rate, automated; Future    Class 3 severe obesity without serious comorbidity with body mass index (BMI) of 45 0 to 49 9 in adult, unspecified obesity type (HCC)    Elevated alkaline phosphatase level  -     Ambulatory referral to Gastroenterology; Future    Abnormal antibody titer  -     Ambulatory referral to Gastroenterology; Future    Chronic nonintractable headache, unspecified headache type  -     Ambulatory referral to Neurology;  Future      Activities as tolerated    Diet: low carb/low fat, more greens/vegetables, adequate hydration  Exercise: try to maintain a low impact exercise regimen as much as possible  Walk for 30 minutes a day for at least 3 days a week    Encouraged to maintain good sleep hygiene  Continue other medications as prescribed by PCP and other specialists        RTC in 5 weeks  HPI    INITIAL VISIT NOTE:  Ms Merlin Collier is a 77-year-old  female with history significant for insulin-dependent diabetes mellitus, with complications related to retinopathy causing legal blindness bilaterally, asthma, osteoarthritis, microcytic anemia of unclear etiology and morbid obesity, who presents for further evaluation of an episode involving bilateral arm pain/stiffness and swelling, associated with significantly elevated inflammatory markers with a CRP greater than 90 and an ESR of greater than 130  She is referred by Dr Adenike Barton for a rheumatology consult      Patient reports in the first week of September 2019, she woke up one morning with sudden onset of pain and stiffness affecting her bilateral arms, to such an extent that she was unable to move them even slightly  This was also associated with a weakness sensation of her lower legs  As her symptoms did not improve within the week she was seen in the emergency room and admitted for further evaluation  Due to the visible swelling an x-ray of her hand and forearm were initially done which only showed dorsal soft tissue swelling  An upper extremity venous duplex ruled out evidence of a deep vein thrombosis  A CTA of her right upper extremity was essentially unremarkable except for edema noted in the subcutaneous fat of the distal forearm and hand although without a vascular etiology  The aortic arch and distal arteries were unremarkable      Further lab workup showed a leukocytosis of 14, with a chronic microcytic anemia with stable hemoglobin of 9, as well as thrombocytosis with platelets ranging from 500-700    Mild transaminitis was seen which eventually resolved, although she continued to have an elevated alkaline phosphatase which is still persistent  An ESR was found to be elevated at greater than 130, with a C reactive protein of greater than 90  An JUAN screen was positive at 1:80 homogeneous pattern  A hepatitis C antibody was found to be equivocal   An anti cardiolipin IgM antibody was mildly elevated at 13  TSH, blood cultures, uric acid, urinalysis, CK, rheumatoid factor, anti CCP antibody, anti neutrophilic cytoplasmic antibody, lupus anticoagulant, Lyme disease PCR, Sjogren's antibodies, SPEP, total complement, beta 2 glycoprotein antibodies, paraneoplastic profile, anti double-stranded DNA antibody, LDH, beta 2 microglobulin, immunoglobulin assay, and direct Gerri test were unremarkable      She reports during the hospital stay she declined the use of steroids due to the insulin-dependent diabetes, or pain medication use  Her symptoms were managed with Tylenol as needed, as well as therapy  She was also evaluated by vascular surgery during her hospital stay, with the workup being unrevealing  She was discharged home and advised to follow-up with Rheumatology for further evaluation  There was a concern for temporal arteritis versus polymyalgia rheumatica given the abrupt onset of her symptoms as well as the elevated inflammatory markers  She reports the majority of her symptoms have since resolved, and lasted for an entire duration of approximately 2 weeks  She did have follow-up labs done on September 30th which showed a persistently elevated ESR of 121, but with the down trending CRP of 23 2      Patient reports since December 2018 she has been experiencing new onset headaches which were diagnosed as migraine headaches by Neurology  She states that the headache is sometimes felt at the back of her head or over her entire head, but is not usually limited to one side    She was recently seen in the emergency room for the headaches as well and no acute findings were noted on an MRI brain are MRA head  The MRI of her brain did show cerebral atrophy more than expected for the patient's age  There were also changes that could be consistent with mild chronic white matter microangiopathy versus sequelae of chronic migraine disease  A CTA head and neck was also unremarkable      She reports a few months ago her weight was 336 lb, and today she is presenting with a weight of 304 lb  She thinks this may be related to a decreased appetite  When her symptoms started at the beginning of September she did experience jaw pain on the right side which self-resolved  She does report a history of multiple miscarriages  She denies fevers, night sweats, scalp tenderness/pain, new vision changes (she does have a history of retinopathy related to insulin-dependent diabetes mellitus, and states she is legally blind in both eyes), current jaw claudication, hair loss, sicca symptoms, unexplained skin rashes, psoriasis, photosensitivity, mouth/nose ulcers, epistaxis, recurrent sinus disease, swollen glands, pleuritic chest pain, hemoptysis (she does have a chronic history of shortness of breath and cough related to asthma), abdominal pain, vomiting, diarrhea, blood in stools, blood clots, Raynaud's, focal weakness or family history of autoimmune disease  Her sister may have some type of arthritis      She is up-to-date with a mammogram, and states Pap smears were discontinued as she has had a hysterectomy  She has never had a colonoscopy  11/6/2019:  Patient presents for a follow-up today  We reviewed the ultrasound done of her bilateral temporal arteries which was unrevealing  A bilateral temporal artery biopsy did not show any inflammatory changes  Her ESR and CRP were still elevated at 95 and 20 7, respectively  Her hemoglobin was stable at 9 3  Her anti smooth muscle antibody was mildly elevated at 25    Her C3 and C4 were also mildly elevated  The remainder of the CBC, CMP (except for the alkaline phosphatase), SPEP, immunofixation, CK, aldolase, HCV RNA, HIV, ferritin, anti mitochondrial antibody, urinalysis and urine protein creatinine ratio were unremarkable  She reports since the last office visit she has had approximately 4 flare-ups of pain and stiffness affecting her predominantly from the bilateral elbows up to her shoulders  She states that this has been occurring on a weekly basis, but it does take days to resolve and overlaps with the next flare she has  She recalls in the past she has also experienced pain in her bilateral hip region, but states currently her predominant symptoms have been occurring from the elbows upwards  Only on occasion will she experience a discomfort from her wrists radiating into her hands  She denies any new complaints of joint pains or swelling  Since the last office visit she has also been experiencing increasing redness of her bilateral eyes  She did see her ophthalmologist a few weeks ago and apparently there was no inflammation detected  She is scheduled for a follow-up on November 20th  She has also been having worsening headaches which she feels in a bandlike fashion across her head  She has never been evaluated by Neurology  The following portions of the patient's history were reviewed and updated as appropriate: allergies, current medications, past family history, past medical history, past social history, past surgical history and problem list       Review of Systems  Constitutional: Negative for weight change, fevers, chills, night sweats, fatigue  ENT/Mouth: Negative for hearing changes, ear pain, nasal congestion, sinus pain, hoarseness, sore throat, rhinorrhea, swallowing difficulty  Eyes: Negative for discharge  Positive for pain, redness and vision changes  Cardiovascular: Negative for chest pain, palpitations     Respiratory: Negative for cough, sputum, wheezing  Positive for shortness of breath  Gastrointestinal: Negative for nausea, vomiting, diarrhea, pain, heartburn  Positive for constipation  Genitourinary: Negative for dysuria, urinary frequency, hematuria  Musculoskeletal: As per HPI  Skin: Negative for skin rash, color changes  Neuro: Negative for weakness, tingling, loss of consciousness  Positive for dizziness, headaches, and numbness  Psych: Negative for anxiety, depression  Heme/Lymph: Negative for easy bruising, bleeding, lymphadenopathy          Past Medical History:   Diagnosis Date    Anemia     Arthritis     Diabetes mellitus ([facility] )     Dyspnea on exertion     Hyperlipidemia     Hypertension     Legally blind 2010    Mild intermittent asthma with exacerbation 8/4/2018    Miscarriage     x 3    Seizures (HCC)     Sickle cell trait ([facility] )     Sleep apnea        Past Surgical History:   Procedure Laterality Date    CATARACT EXTRACTION Bilateral     august and september    EYE SURGERY      HYSTERECTOMY      37    OOPHORECTOMY Left     37    NH TEMPORAL ARTERY LIGATN OR BX Bilateral 10/17/2019    Procedure: BIOPSY ARTERY TEMPORAL;  Surgeon: Violeta Melendez DO;  Location: [facility];  Service: Vascular       Social History     Socioeconomic History    Marital status: /Civil Union     Spouse name: Not on file    Number of children: Not on file    Years of education: Not on file    Highest education level: Not on file   Occupational History    Occupation: employed   Social Needs    Financial resource strain: Not on file    Food insecurity:     Worry: Not on file     Inability: Not on file   [employer] needs:     Medical: Not on file     Non-medical: Not on file   Tobacco Use    Smoking status: Never Smoker    Smokeless tobacco: Never Used   Substance and Sexual Activity    Alcohol use: Never     Alcohol/week: 0 0 standard drinks     Frequency: Never     Drinks per session: Patient refused     Binge frequency: Never    Drug use: No    Sexual activity: Not Currently     Partners: Male     Birth control/protection: None   Lifestyle    Physical activity:     Days per week: Not on file     Minutes per session: Not on file    Stress: Not on file   Relationships    Social connections:     Talks on phone: Not on file     Gets together: Not on file     Attends Religion service: Not on file     Active member of club or organization: Not on file     Attends meetings of clubs or organizations: Not on file     Relationship status: Not on file    Intimate partner violence:     Fear of current or ex partner: Not on file     Emotionally abused: Not on file     Physically abused: Not on file     Forced sexual activity: Not on file   Other Topics Concern    Not on file   Social History Narrative    Caffeine use       Family History   Problem Relation Age of Onset    Heart attack Mother     Heart disease Mother     Hypertension Mother     No Known Problems Father     Heart disease Sister     No Known Problems Brother     No Known Problems Son     No Known Problems Daughter     Heart attack Maternal Grandmother     Heart disease Maternal Grandmother     Diabetes Other     Heart attack Other     No Known Problems Sister     No Known Problems Sister     No Known Problems Paternal Aunt     No Known Problems Son     Cancer Neg Hx     Stroke Neg Hx        No Known Allergies      Current Outpatient Medications:     albuterol (PROVENTIL HFA) 90 mcg/act inhaler, Inhale 2 puffs every 6 (six) hours as needed for wheezing For another 24 hours use every 4 hours standing, Disp: 6 7 g, Rfl: 0    aspirin 81 MG tablet, Take 1 tablet by mouth daily, Disp: , Rfl:     atorvastatin (LIPITOR) 40 mg tablet, Take 1 tablet (40 mg total) by mouth daily, Disp: 90 tablet, Rfl: 1    BASAGLAR KWIKPEN 100 units/mL injection pen, Inject 30 Units under the skin every 12 (twelve) hours, Disp: 5 pen, Rfl: 0   SARAH MICROLET LANCETS lancets, Inject 1 applicator into the skin 4 (four) times a day, Disp: , Rfl:     Blood Glucose Monitoring Suppl (CONTOUR NEXT MONITOR) w/Device KIT, Disp 1 meter dx E11 9, may submitted any contour next meter  If not covered let us know we can change strips, Disp: 1 kit, Rfl: 1    Blood Pressure Monitor KIT, Check blood pressures BID, Disp: 1 each, Rfl: 0    budesonide-formoterol (SYMBICORT) 80-4 5 MCG/ACT inhaler, Inhale 2 puffs 2 (two) times a day Rinse mouth after use  (Patient taking differently: Inhale 2 puffs as needed Rinse mouth after use ), Disp: 1 Inhaler, Rfl: 5    Cholecalciferol (VITAMIN D3) 3000 units TABS, Take by mouth daily , Disp: , Rfl:     CONTOUR NEXT TEST test strip, Test blood sugar 3x per day dx E11 9, Disp: 300 each, Rfl: 1    diltiazem (TIAZAC) 420 MG 24 hr capsule, Take 1 capsule (420 mg total) by mouth daily, Disp: 30 capsule, Rfl: 5    gabapentin (NEURONTIN) 100 mg capsule, Take 1 capsule (100 mg total) by mouth 2 (two) times a day, Disp: 180 capsule, Rfl: 1    HUMALOG KWIKPEN 100 units/mL injection pen, 10 U before breakfast, 10 U before lunch, and 14 U before dinner, Disp: 10 pen, Rfl: 3    Insulin Pen Needle (BD PEN NEEDLE DERRICK U/F) 32G X 4 MM MISC, Inject 1 applicator under the skin 4 (four) times a day, Disp: , Rfl:     losartan (COZAAR) 100 MG tablet, TAKE 1 TABLET BY MOUTH EVERY DAY, Disp: 30 tablet, Rfl: 5    predniSONE 20 mg tablet, Take 1 tablet (20 mg total) by mouth daily, Disp: 60 tablet, Rfl: 0      Objective:    Vitals:    11/06/19 1408   BP: 152/86   Pulse: 105   Weight: (!) 143 kg (316 lb)   Height: 5' 8" (1 727 m)       Physical Exam  General: Well appearing, well nourished, in no distress  Oriented x 3, normal mood and affect  Ambulating without difficulty  Obese  Skin: Good turgor, no unusual bruising or prominent lesions  Slight hyperpigmentation noted over her anterior chest   No psoriasis     Nails: Normal color, no deformities  HEENT:  Head: Normocephalic, atraumatic  Non tender temporal arteries bilaterally with good pulsations  No scalp tenderness noted  Eyes: EOM intact  She is legally blind  She has significant conjunctival erythema present bilaterally  Nose: No external lesions, mucosa non-inflamed  Mouth: Mucous membranes moist, no mucosal lesions  Extremities: No amputations or deformities, cyanosis, edema  Musculoskeletal:   There is no soft tissue swelling noted over her muscle regions  She does have mild tenderness to palpation of her right shoulder, but there is no myofascial tenderness present  She has difficulty with abduction of her bilateral shoulders up to approximately 90°  Neurologic: Alert and oriented  No focal neurological deficits appreciated  Psychiatric: Normal mood and affect  DOC Brewer    Rheumatology

## 2019-11-08 ENCOUNTER — OFFICE VISIT (OUTPATIENT)
Dept: INTERNAL MEDICINE CLINIC | Facility: CLINIC | Age: 59
End: 2019-11-08
Payer: COMMERCIAL

## 2019-11-08 ENCOUNTER — OFFICE VISIT (OUTPATIENT)
Dept: HEMATOLOGY ONCOLOGY | Facility: CLINIC | Age: 59
End: 2019-11-08
Payer: COMMERCIAL

## 2019-11-08 VITALS
OXYGEN SATURATION: 98 % | HEIGHT: 68 IN | WEIGHT: 293 LBS | BODY MASS INDEX: 44.41 KG/M2 | DIASTOLIC BLOOD PRESSURE: 80 MMHG | RESPIRATION RATE: 16 BRPM | HEART RATE: 92 BPM | TEMPERATURE: 99.5 F | SYSTOLIC BLOOD PRESSURE: 140 MMHG

## 2019-11-08 VITALS
HEART RATE: 103 BPM | WEIGHT: 293 LBS | TEMPERATURE: 98.9 F | SYSTOLIC BLOOD PRESSURE: 124 MMHG | DIASTOLIC BLOOD PRESSURE: 77 MMHG | BODY MASS INDEX: 45.31 KG/M2

## 2019-11-08 DIAGNOSIS — R79.82 ELEVATED C-REACTIVE PROTEIN (CRP): ICD-10-CM

## 2019-11-08 DIAGNOSIS — I10 ESSENTIAL HYPERTENSION: ICD-10-CM

## 2019-11-08 DIAGNOSIS — E11.42 DIABETIC POLYNEUROPATHY ASSOCIATED WITH TYPE 2 DIABETES MELLITUS (HCC): ICD-10-CM

## 2019-11-08 DIAGNOSIS — IMO0002 UNCONTROLLED TYPE 2 DIABETES MELLITUS WITH OPHTHALMIC COMPLICATION, WITH LONG-TERM CURRENT USE OF INSULIN: Primary | ICD-10-CM

## 2019-11-08 DIAGNOSIS — D50.8 IRON DEFICIENCY ANEMIA SECONDARY TO INADEQUATE DIETARY IRON INTAKE: Primary | ICD-10-CM

## 2019-11-08 DIAGNOSIS — J45.21 MILD INTERMITTENT ASTHMA WITH EXACERBATION: ICD-10-CM

## 2019-11-08 DIAGNOSIS — G63 POLYNEUROPATHY ASSOCIATED WITH UNDERLYING DISEASE (HCC): ICD-10-CM

## 2019-11-08 DIAGNOSIS — R51.9 FREQUENT HEADACHES: ICD-10-CM

## 2019-11-08 DIAGNOSIS — G72.49 OTHER INFLAMMATORY AND IMMUNE MYOPATHIES, NOT ELSEWHERE CLASSIFIED: ICD-10-CM

## 2019-11-08 DIAGNOSIS — D75.839 THROMBOCYTOSIS: ICD-10-CM

## 2019-11-08 DIAGNOSIS — D72.829 LEUKOCYTOSIS, UNSPECIFIED TYPE: ICD-10-CM

## 2019-11-08 DIAGNOSIS — D50.9 MICROCYTIC ANEMIA: ICD-10-CM

## 2019-11-08 DIAGNOSIS — R70.0 ELEVATED SED RATE: ICD-10-CM

## 2019-11-08 PROCEDURE — 1036F TOBACCO NON-USER: CPT | Performed by: INTERNAL MEDICINE

## 2019-11-08 PROCEDURE — 99214 OFFICE O/P EST MOD 30 MIN: CPT | Performed by: INTERNAL MEDICINE

## 2019-11-08 RX ORDER — GABAPENTIN 100 MG/1
CAPSULE ORAL
Qty: 180 CAPSULE | Refills: 1
Start: 2019-11-08 | End: 2019-12-09 | Stop reason: SDUPTHER

## 2019-11-08 NOTE — PROGRESS NOTES
Hematology/Oncology Outpatient Follow-up  Buster Christopher 62 y o  female 1960 [id number]    Date:  11/8/2019    Assessment and Plan:  1  Elevated sed rate  The elevated sed rate and C-reactive protein is most likely related to her rheumatological disorder  Will continue to monitor her closely  2  Elevated C-reactive protein (CRP)  As above    3  Leukocytosis, unspecified type  Her white cell count is currently according to the last measurement with in the high normal range  There is no hint of immature cells in the peripheral blood  The increased was most likely reactive in nature  - CBC and differential; Future  - Comprehensive metabolic panel; Future  - C-reactive protein; Future  - Sedimentation rate, automated; Future  - Iron Panel (Includes Ferritin, Iron Sat%, Iron, and TIBC); Future  - Ferritin; Future  - Vitamin B12; Future  - Occult Blood, Fecal Immunochemical; Future  - Direct antiglobulin test; Future  - Hemolysis Smear; Future  - LD,Blood; Future  - Hemoglobin Electrophoresis; Future  - Haptoglobin; Future  - TSH, 3rd generation with Free T4 reflex; Future    4  Microcytic anemia  This is most likely related to inflammatory process  There is no hint of iron deficiency  We will recheck her blood work again about 6 weeks from now for close monitoring     - CBC and differential; Future  - Comprehensive metabolic panel; Future  - C-reactive protein; Future  - Sedimentation rate, automated; Future  - Iron Panel (Includes Ferritin, Iron Sat%, Iron, and TIBC); Future  - Ferritin; Future  - Vitamin B12; Future  - Occult Blood, Fecal Immunochemical; Future  - Direct antiglobulin test; Future  - Hemolysis Smear; Future  - LD,Blood; Future  - Hemoglobin Electrophoresis; Future  - Haptoglobin; Future  - TSH, 3rd generation with Free T4 reflex; Future    5  Thrombocytosis (HCC)  Her platelet count went down almost to the normal range  This most likely was reactive increase during the hospital stay    I think her platelet count will be within normal range once her inflammatory process is under control  HPI:  This is a 66-year-old female with the history of bilateral upper extremity pain/swelling, history of seizure since childhood, sickle cell trait, asthma, obstructive sleep apnea, hypertension, diabetes, hyperlipidemia, etc     The patient was seen in the hospital when she was admitted in September of 2019 for weakness of the upper extremities and swelling of the right upper extremity mainly  She did also have multiple other symptoms including weight loss, drenching night sweats, and severe fatigue  During the hospital stay she was found to have elevated white cell count and platelets  Her hemoglobin level was around 9 g  She had extensive workup which was negative for any hint of monoclonal gammopathy  She did have elevated sed rate and C-reactive protein among other markers  Her iron panel showed a very high ferritin level of 560 with saturation of 15%  Her JUAN test was positive  Limited hypercoagulable workup was negative for the lupus anticoagulant, cardiolipin anti lipid antibodies and beta 2 glycoprotein  The patient was recently diagnosed with the fibromyalgia rheumatica by her rheumatologist and was started on prednisone 20 mg once a day starting this morning  Interval history:  The patient came today for a follow-up visit  Her most recent blood work on 10/09/2019 showed to us significant improvement of the white cell count down to 9 5 with hemoglobin of 9 3 and MCV of 81 her platelet count went down to 396  The white cell differential is within normal range  Her creatinine was 1 7 with normal liver enzymes help alk-phos was high at 213  The patient continues to complain about multiple symptoms including fatigue and achiness of her muscle mainly in the right upper extremity  She denies bleeding from any sites      ROS: Review of Systems   Constitutional: Positive for appetite change and fatigue  Negative for activity change, chills, diaphoresis, fever and unexpected weight change  HENT: Negative for congestion, dental problem, ear discharge, ear pain, facial swelling, hearing loss, mouth sores, nosebleeds, postnasal drip, sore throat, tinnitus and trouble swallowing  Eyes: Negative for discharge, redness, itching and visual disturbance  Respiratory: Positive for shortness of breath  Negative for cough, chest tightness and wheezing  Cardiovascular: Negative for chest pain, palpitations and leg swelling  Gastrointestinal: Positive for constipation  Negative for abdominal distention, abdominal pain, anal bleeding, blood in stool, diarrhea, nausea and vomiting  Genitourinary: Negative for difficulty urinating, dysuria, flank pain, frequency, hematuria and urgency  Musculoskeletal: Positive for arthralgias (Mainly of the right upper extremity and shoulder)  Negative for back pain, gait problem, joint swelling, myalgias and neck pain  Skin: Negative for color change, pallor, rash and wound  Neurological: Positive for dizziness, numbness and headaches  Negative for syncope, speech difficulty, weakness and light-headedness  Hematological: Negative for adenopathy  Does not bruise/bleed easily  Psychiatric/Behavioral: Positive for sleep disturbance  Negative for agitation, behavioral problems and confusion         Past Medical History:   Diagnosis Date    Anemia     Arthritis     Diabetes mellitus ([facility] )     Dyspnea on exertion     Hyperlipidemia     Hypertension     Legally blind 2010    Mild intermittent asthma with exacerbation 8/4/2018    Miscarriage     x 3    Seizures (HCC)     Sickle cell trait ([facility] )     Sleep apnea        Past Surgical History:   Procedure Laterality Date    CATARACT EXTRACTION Bilateral     august and september    EYE SURGERY      HYSTERECTOMY      37    OOPHORECTOMY Left     37    NJ TEMPORAL ARTERY LIGATN OR BX Bilateral 10/17/2019 Procedure: BIOPSY ARTERY TEMPORAL;  Surgeon: Mansoor Puckett DO;  Location: [facility];  Service: Vascular       Social History     Socioeconomic History    Marital status: /Civil Union     Spouse name: None    Number of children: None    Years of education: None    Highest education level: None   Occupational History    Occupation: employed   Social Needs    Financial resource strain: None    Food insecurity:     Worry: None     Inability: None    Transportation needs:     Medical: None     Non-medical: None   Tobacco Use    Smoking status: Never Smoker    Smokeless tobacco: Never Used   Substance and Sexual Activity    Alcohol use: Never     Alcohol/week: 0 0 standard drinks     Frequency: Never     Drinks per session: Patient refused     Binge frequency: Never    Drug use: No    Sexual activity: Not Currently     Partners: Male     Birth control/protection: None   Lifestyle    Physical activity:     Days per week: None     Minutes per session: None    Stress: None   Relationships    Social connections:     Talks on phone: None     Gets together: None     Attends Yazidism service: None     Active member of club or organization: None     Attends meetings of clubs or organizations: None     Relationship status: None    Intimate partner violence:     Fear of current or ex partner: None     Emotionally abused: None     Physically abused: None     Forced sexual activity: None   Other Topics Concern    None   Social History Narrative    Caffeine use       Family History   Problem Relation Age of Onset    Heart attack Mother     Heart disease Mother     Hypertension Mother     No Known Problems Father     Heart disease Sister     No Known Problems Brother     No Known Problems Son     No Known Problems Daughter     Heart attack Maternal Grandmother     Heart disease Maternal Grandmother     Diabetes Other     Heart attack Other     No Known Problems Sister     No Known Problems Sister     No Known Problems Paternal Aunt     No Known Problems Son     Cancer Neg Hx     Stroke Neg Hx        No Known Allergies      Current Outpatient Medications:     albuterol (PROVENTIL HFA) 90 mcg/act inhaler, Inhale 2 puffs every 6 (six) hours as needed for wheezing For another 24 hours use every 4 hours standing, Disp: 6 7 g, Rfl: 0    aspirin 81 MG tablet, Take 1 tablet by mouth daily, Disp: , Rfl:     atorvastatin (LIPITOR) 40 mg tablet, Take 1 tablet (40 mg total) by mouth daily, Disp: 90 tablet, Rfl: 1    BASAGLAR KWIKPEN 100 units/mL injection pen, Inject 30 Units under the skin every 12 (twelve) hours, Disp: 5 pen, Rfl: 0    SARAH MICROLET LANCETS lancets, Inject 1 applicator into the skin 4 (four) times a day, Disp: , Rfl:     Blood Glucose Monitoring Suppl (CONTOUR NEXT MONITOR) w/Device KIT, Disp 1 meter dx E11 9, may submitted any contour next meter  If not covered let us know we can change strips, Disp: 1 kit, Rfl: 1    Blood Pressure Monitor KIT, Check blood pressures BID, Disp: 1 each, Rfl: 0    budesonide-formoterol (SYMBICORT) 80-4 5 MCG/ACT inhaler, Inhale 2 puffs 2 (two) times a day Rinse mouth after use   (Patient taking differently: Inhale 2 puffs as needed Rinse mouth after use ), Disp: 1 Inhaler, Rfl: 5    Cholecalciferol (VITAMIN D3) 3000 units TABS, Take by mouth daily , Disp: , Rfl:     CONTOUR NEXT TEST test strip, Test blood sugar 3x per day dx E11 9, Disp: 300 each, Rfl: 1    diltiazem (TIAZAC) 420 MG 24 hr capsule, Take 1 capsule (420 mg total) by mouth daily, Disp: 30 capsule, Rfl: 5    gabapentin (NEURONTIN) 100 mg capsule, Take 1 capsule in am and 2-3 capsules at night, Disp: 180 capsule, Rfl: 1    HUMALOG KWIKPEN 100 units/mL injection pen, 10 U before breakfast, 10 U before lunch, and 14 U before dinner, Disp: 10 pen, Rfl: 3    Insulin Pen Needle (BD PEN NEEDLE DERRICK U/F) 32G X 4 MM MISC, Inject 1 applicator under the skin 4 (four) times a day, Disp: , Rfl:   losartan (COZAAR) 100 MG tablet, TAKE 1 TABLET BY MOUTH EVERY DAY, Disp: 30 tablet, Rfl: 5    predniSONE 20 mg tablet, Take 1 tablet (20 mg total) by mouth daily, Disp: 60 tablet, Rfl: 0      Physical Exam:  /80 (BP Location: Left arm, Patient Position: Sitting, Cuff Size: Adult)   Pulse 92   Temp 99 5 °F (37 5 °C) (Tympanic)   Resp 16   Ht 5' 8" (1 727 m)   Wt 135 kg (298 lb)   SpO2 98%   BMI 45 31 kg/m²     Physical Exam   Constitutional: She is oriented to person, place, and time  She appears well-developed and well-nourished  No distress  HENT:   Head: Normocephalic and atraumatic  Nose: Nose normal    Mouth/Throat: Oropharynx is clear and moist    Eyes: Pupils are equal, round, and reactive to light  Conjunctivae and EOM are normal  Right eye exhibits no discharge  Left eye exhibits no discharge  No scleral icterus  Neck: Normal range of motion  Neck supple  No JVD present  No tracheal deviation present  No thyromegaly present  Cardiovascular: Normal rate, regular rhythm and normal heart sounds  Exam reveals no friction rub  No murmur heard  Pulmonary/Chest: Effort normal and breath sounds normal  No stridor  No respiratory distress  She has no wheezes  She has no rales  She exhibits no tenderness  Abdominal: Soft  Bowel sounds are normal  She exhibits no distension and no mass  There is no hepatosplenomegaly, splenomegaly or hepatomegaly  There is no tenderness  There is no rebound and no guarding  Morbidly obese abdomen   Musculoskeletal: Normal range of motion  She exhibits no edema, tenderness or deformity  Tenderness on palpation of the right upper extremity and right shoulder   Lymphadenopathy:     She has no cervical adenopathy  Neurological: She is alert and oriented to person, place, and time  She has normal reflexes  No cranial nerve deficit  Coordination normal    Skin: Skin is warm and dry  No rash noted  She is not diaphoretic  No erythema  No pallor  Psychiatric: She has a normal mood and affect  Her behavior is normal  Judgment and thought content normal          Labs:  Lab Results   Component Value Date    WBC 9 55 10/09/2019    HGB 9 3 (L) 10/09/2019    HCT 31 6 (L) 10/09/2019    MCV 81 (L) 10/09/2019     (H) 10/09/2019     Lab Results   Component Value Date     01/04/2016    K 4 0 10/09/2019     10/09/2019    CO2 28 10/09/2019    ANIONGAP 5 01/04/2016    BUN 19 10/09/2019    CREATININE 1 07 10/09/2019    GLUCOSE 135 01/04/2016    GLUF 182 (H) 08/24/2019    CALCIUM 9 3 10/09/2019    AST 10 10/09/2019    ALT 15 10/09/2019    ALKPHOS 213 (H) 10/09/2019    PROT 7 7 01/04/2016    BILITOT 0 69 01/04/2016    EGFR 65 10/09/2019       Patient voiced understanding and agreement in the above discussion  Aware to contact our office with questions/symptoms in the interim

## 2019-11-08 NOTE — PROGRESS NOTES
Assessment/Plan:  Blood pressure is well control, blood sugars on the current regimen of stable as well  Discussed the anticipated rise in sugars on the prednisone  Discussed that mostly postprandial blood sugars at 1st to be affected and she needs a very close monitoring, dietary control while on the prednisone  We described the anticipated course if she does respond well to the prednisone for polymyalgia rheumatica, the taper of prednisone is generally very slow  She is still having headaches and not sleeping well  I will increase her gabapentin dose at nighttime to 200 mg and 3 than 300 mg of tolerated  She is aware of calling Neurology for a follow-up appointment  She has continued anemia, likely anemia of chronic disease  she is seeing the hematologist today, I reminded her the need for a colonoscopy    She is up-to-date on influenza and pneumonia vaccination     Diagnoses and all orders for this visit:    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, with long-term current use of insulin ([address] )    Essential hypertension    Diabetic polyneuropathy associated with type 2 diabetes mellitus ([address] )  -     gabapentin (NEURONTIN) 100 mg capsule; Take 1 capsule in am and 2-3 capsules at night    Mild intermittent asthma with exacerbation    Polyneuropathy associated with underlying disease ([address] )    Other inflammatory and immune myopathies, not elsewhere classified    Frequent headaches          Subjective:   Chief Complaint   Patient presents with    Follow-up     1 month        Patient ID: Mitesh Ureña is a 62 y o  female  She comes in for follow-up of diabetes, hypertension, neuropathy, headaches, possible polymyalgia rheumatica, visual loss    She continues to have body aches shoulder pain, hip pain, having difficulty standing up  Having daily headaches mostly in the evening hours  She has some pain around her eyes as well    Her blood sugars are better she has states her most fastings are around  evenings are generally around 150 as well with occasional sugars less than 100 as well  She has been much better regimen did on eating better  She just took the 1st dose of prednisone today  The following portions of the patient's history were reviewed and updated as appropriate: current medications, past medical history, past social history and past surgical history  PHQ-9 Depression Screening    PHQ-9:    Frequency of the following problems over the past two weeks:                Current Outpatient Medications:     albuterol (PROVENTIL HFA) 90 mcg/act inhaler, Inhale 2 puffs every 6 (six) hours as needed for wheezing For another 24 hours use every 4 hours standing, Disp: 6 7 g, Rfl: 0    aspirin 81 MG tablet, Take 1 tablet by mouth daily, Disp: , Rfl:     atorvastatin (LIPITOR) 40 mg tablet, Take 1 tablet (40 mg total) by mouth daily, Disp: 90 tablet, Rfl: 1    BASAGLAR KWIKPEN 100 units/mL injection pen, Inject 30 Units under the skin every 12 (twelve) hours, Disp: 5 pen, Rfl: 0    [employer] - [employer] MICROLET LANCETS lancets, Inject 1 applicator into the skin 4 (four) times a day, Disp: , Rfl:     Blood Glucose Monitoring Suppl (CONTOUR NEXT MONITOR) w/Device KIT, Disp 1 meter dx E11 9, may submitted any contour next meter  If not covered let us know we can change strips, Disp: 1 kit, Rfl: 1    Blood Pressure Monitor KIT, Check blood pressures BID, Disp: 1 each, Rfl: 0    budesonide-formoterol (SYMBICORT) 80-4 5 MCG/ACT inhaler, Inhale 2 puffs 2 (two) times a day Rinse mouth after use   (Patient taking differently: Inhale 2 puffs as needed Rinse mouth after use ), Disp: 1 Inhaler, Rfl: 5    Cholecalciferol (VITAMIN D3) 3000 units TABS, Take by mouth daily , Disp: , Rfl:     CONTOUR NEXT TEST test strip, Test blood sugar 3x per day dx E11 9, Disp: 300 each, Rfl: 1    diltiazem (TIAZAC) 420 MG 24 hr capsule, Take 1 capsule (420 mg total) by mouth daily, Disp: 30 capsule, Rfl: 5   gabapentin (NEURONTIN) 100 mg capsule, Take 1 capsule in am and 2-3 capsules at night, Disp: 180 capsule, Rfl: 1    HUMALOG KWIKPEN 100 units/mL injection pen, 10 U before breakfast, 10 U before lunch, and 14 U before dinner, Disp: 10 pen, Rfl: 3    Insulin Pen Needle (BD PEN NEEDLE DERRICK U/F) 32G X 4 MM MISC, Inject 1 applicator under the skin 4 (four) times a day, Disp: , Rfl:     losartan (COZAAR) 100 MG tablet, TAKE 1 TABLET BY MOUTH EVERY DAY, Disp: 30 tablet, Rfl: 5    predniSONE 20 mg tablet, Take 1 tablet (20 mg total) by mouth daily, Disp: 60 tablet, Rfl: 0    Review of Systems   Constitutional: Positive for fatigue  Negative for fever and unexpected weight change  HENT: Negative for ear pain, hearing loss and sore throat  Eyes: Positive for visual disturbance  Negative for pain and discharge  Respiratory: Negative for cough, chest tightness and shortness of breath  Cardiovascular: Negative for chest pain and palpitations  Gastrointestinal: Positive for constipation  Negative for abdominal pain, blood in stool, diarrhea and nausea  Genitourinary: Negative for dysuria, frequency and hematuria  Musculoskeletal: Positive for arthralgias and back pain  Negative for joint swelling  Skin: Negative for rash  Allergic/Immunologic: Negative for immunocompromised state  Neurological: Positive for headaches  Negative for dizziness  Hematological: Negative for adenopathy  Psychiatric/Behavioral: Positive for sleep disturbance  Negative for confusion  Objective:  /77 (BP Location: Left arm, Patient Position: Sitting, Cuff Size: Large)   Pulse 103   Temp 98 9 °F (37 2 °C)   Wt 135 kg (298 lb)   BMI 45 31 kg/m²      Physical Exam   Constitutional: She appears well-developed and well-nourished  HENT:   Head: Normocephalic and atraumatic     Right Ear: Tympanic membrane normal    Left Ear: Tympanic membrane normal    Nose: Nose normal    Mouth/Throat: Oropharynx is clear and moist  No posterior oropharyngeal edema or posterior oropharyngeal erythema  Eyes: Pupils are equal, round, and reactive to light  Right eye exhibits no discharge  Left eye exhibits no discharge  Right conjunctiva is injected  Left conjunctiva is injected  Neck: Normal range of motion  Neck supple  No thyromegaly present  Cardiovascular: Normal rate, regular rhythm, S1 normal, S2 normal and normal heart sounds  PMI is not displaced  No murmur heard  Pulmonary/Chest: Effort normal and breath sounds normal  No accessory muscle usage  No apnea  No respiratory distress  She has no rhonchi  She has no rales  Abdominal: Soft  Normal appearance and bowel sounds are normal  She exhibits no shifting dullness  There is no hepatosplenomegaly  There is no tenderness  There is no rebound and no CVA tenderness  Musculoskeletal: Normal range of motion  She exhibits no edema or tenderness  Lymphadenopathy:     She has no cervical adenopathy  Neurological: She is alert  Skin: Skin is warm and intact  No rash noted  Psychiatric: She has a normal mood and affect  Her speech is normal    Nursing note and vitals reviewed          Recent Results (from the past 1008 hour(s))   Fingerstick Glucose (POCT)    Collection Time: 09/27/19  4:59 PM   Result Value Ref Range    POC Glucose 218 (H) 65 - 140 mg/dl   Fingerstick Glucose (POCT)    Collection Time: 09/27/19  9:35 PM   Result Value Ref Range    POC Glucose 144 (H) 65 - 140 mg/dl   Fingerstick Glucose (POCT)    Collection Time: 09/28/19  6:24 AM   Result Value Ref Range    POC Glucose 103 65 - 140 mg/dl   Fingerstick Glucose (POCT)    Collection Time: 09/28/19 11:49 AM   Result Value Ref Range    POC Glucose 221 (H) 65 - 140 mg/dl   Fingerstick Glucose (POCT)    Collection Time: 09/28/19  4:24 PM   Result Value Ref Range    POC Glucose 133 65 - 140 mg/dl   Fingerstick Glucose (POCT)    Collection Time: 09/28/19  8:31 PM   Result Value Ref Range    POC Glucose 199 (H) 65 - 140 mg/dl   Fingerstick Glucose (POCT)    Collection Time: 09/29/19  6:09 AM   Result Value Ref Range    POC Glucose 128 65 - 140 mg/dl   Fingerstick Glucose (POCT)    Collection Time: 09/29/19 12:12 PM   Result Value Ref Range    POC Glucose 115 65 - 140 mg/dl   Fingerstick Glucose (POCT)    Collection Time: 09/29/19  4:36 PM   Result Value Ref Range    POC Glucose 114 65 - 140 mg/dl   Fingerstick Glucose (POCT)    Collection Time: 09/29/19  8:32 PM   Result Value Ref Range    POC Glucose 104 65 - 140 mg/dl   Sedimentation rate, automated    Collection Time: 09/30/19  4:54 AM   Result Value Ref Range    Sed Rate 121 (H) 1 - 20 mm/hour   C-reactive protein    Collection Time: 09/30/19  4:54 AM   Result Value Ref Range    CRP 23 2 (H) <10 0 mg/L   Fingerstick Glucose (POCT)    Collection Time: 09/30/19  6:04 AM   Result Value Ref Range    POC Glucose 98 65 - 140 mg/dl   Fingerstick Glucose (POCT)    Collection Time: 09/30/19 11:56 AM   Result Value Ref Range    POC Glucose 112 65 - 140 mg/dl   Immunofixation, Serum(Reflex Only-Do Not Order)    Collection Time: 10/09/19 11:34 AM   Result Value Ref Range    Immunofixation Interpretation       Immunofixation performed at the request of Dr Flor Crowell  Serum immunofixation shows no monoclonal gammopathy   Reviewed by: Duyen Sampson MD ([id number])  ** Electronic Signature**   Protein / creatinine ratio, urine    Collection Time: 10/09/19 12:27 PM   Result Value Ref Range    Creatinine, Ur 120 0 mg/dL    Protein Urine Random 22 mg/dL    Prot/Creat Ratio, Ur 0 18 (H) 0 00 - 0 10   Urinalysis with microscopic    Collection Time: 10/09/19 12:27 PM   Result Value Ref Range    Clarity, UA Clear     Color, UA Yellow     Specific [place], UA 1 010 1 003 - 1 030    pH, UA 6 5 4 5, 5 0, 5 5, 6 0, 6 5, 7 0, 7 5, 8 0    Glucose, UA Negative Negative mg/dl    Ketones, UA Negative Negative mg/dl    Blood, UA Negative Negative    Protein, UA Trace (A) Negative mg/dl    Nitrite, UA Negative Negative    Bilirubin, UA Negative Negative    Urobilinogen, UA 0 2 0 2, 1 0 E U /dl E U /dl    Leukocytes, UA Negative Negative    WBC, UA 2-4 (A) None Seen, 0-5, 5-55, 5-65 /hpf    RBC, UA 0-1 (A) None Seen, 0-5 /hpf    Bacteria, UA Occasional None Seen, Occasional /hpf    Uric Acid Bettie, UA Occasional /hpf    Epithelial Cells Moderate (A) None Seen, Occasional /hpf    MUCUS THREADS Occasional (A) None Seen   CBC and differential    Collection Time: 10/09/19 12:27 PM   Result Value Ref Range    WBC 9 55 4 31 - 10 16 Thousand/uL    RBC 3 92 3 81 - 5 12 Million/uL    Hemoglobin 9 3 (L) 11 5 - 15 4 g/dL    Hematocrit 31 6 (L) 34 8 - 46 1 %    MCV 81 (L) 82 - 98 fL    MCH 23 7 (L) 26 8 - 34 3 pg    MCHC 29 4 (L) 31 4 - 37 4 g/dL    RDW 16 4 (H) 11 6 - 15 1 %    MPV 9 4 8 9 - 12 7 fL    Platelets 241 (H) 453 - 390 Thousands/uL    nRBC 0 /100 WBCs    Neutrophils Relative 68 43 - 75 %    Immat GRANS % 1 0 - 2 %    Lymphocytes Relative 22 14 - 44 %    Monocytes Relative 5 4 - 12 %    Eosinophils Relative 4 0 - 6 %    Basophils Relative 0 0 - 1 %    Neutrophils Absolute 6 51 1 85 - 7 62 Thousands/µL    Immature Grans Absolute 0 06 0 00 - 0 20 Thousand/uL    Lymphocytes Absolute 2 14 0 60 - 4 47 Thousands/µL    Monocytes Absolute 0 46 0 17 - 1 22 Thousand/µL    Eosinophils Absolute 0 34 0 00 - 0 61 Thousand/µL    Basophils Absolute 0 04 0 00 - 0 10 Thousands/µL   Comprehensive metabolic panel    Collection Time: 10/09/19 12:27 PM   Result Value Ref Range    Sodium 141 136 - 145 mmol/L    Potassium 4 0 3 5 - 5 3 mmol/L    Chloride 105 100 - 108 mmol/L    CO2 28 21 - 32 mmol/L    ANION GAP 8 4 - 13 mmol/L    BUN 19 5 - 25 mg/dL    Creatinine 1 07 0 60 - 1 30 mg/dL    Glucose 142 (H) 65 - 140 mg/dL    Calcium 9 3 8 3 - 10 1 mg/dL    AST 10 5 - 45 U/L    ALT 15 12 - 78 U/L    Alkaline Phosphatase 213 (H) 46 - 116 U/L    Total Protein 7 7 6 4 - 8 2 g/dL    Albumin 2 8 (L) 3 5 - 5 0 g/dL    Total Bilirubin 0 40 0 20 - 1 00 mg/dL    eGFR 65 ml/min/1 73sq m   C-reactive protein    Collection Time: 10/09/19 12:27 PM   Result Value Ref Range    CRP 20 7 (H) <3 0 mg/L   Sedimentation rate, automated    Collection Time: 10/09/19 12:27 PM   Result Value Ref Range    Sed Rate 95 (H) 0 - 20 mm/hour   Nuclear antigen antibody    Collection Time: 10/09/19 12:27 PM   Result Value Ref Range    HIRO [place] (SM) Ab <0 2 0 0 - 0 9 AI    HIRO RNP Ab <0 2 0 0 - 0 9 AI   Anti-scleroderma antibody    Collection Time: 10/09/19 12:27 PM   Result Value Ref Range    Scleroderma SCL-70 <0 2 0 0 - 0 9 AI   Centromere Antibody    Collection Time: 10/09/19 12:27 PM   Result Value Ref Range    Anti-Centromere B Antibodies <0 2 0 0 - 0 9 AI   Anti-Wesley 1 Antibody    Collection Time: 10/09/19 12:27 PM   Result Value Ref Range    Anti WESLEY-1 <0 2 0 0 - 0 9 AI   Aldolase    Collection Time: 10/09/19 12:27 PM   Result Value Ref Range    Aldolase 3 6 3 3 - 10 3 U/L   Anti-smooth muscle antibody, IgG    Collection Time: 10/09/19 12:27 PM   Result Value Ref Range    Smooth Muscle Ab 25 (H) 0 - 19 Units   Antimitochondrial antibody    Collection Time: 10/09/19 12:27 PM   Result Value Ref Range    Mitochondrial Ab <20 0 0 0 - 20 0 Units   C3 complement    Collection Time: 10/09/19 12:27 PM   Result Value Ref Range    C3 Complement 183 0 (H) 90 0 - 180 0 mg/dL   C4 complement    Collection Time: 10/09/19 12:27 PM   Result Value Ref Range    C4, COMPLEMENT 45 0 (H) 10 0 - 40 0 mg/dL   Ferritin    Collection Time: 10/09/19 12:27 PM   Result Value Ref Range    Ferritin 270 8 - 388 ng/mL   HIV 1/2 AG-AB combo    Collection Time: 10/09/19 12:27 PM   Result Value Ref Range    HIV-1/HIV-2 Ab Non-Reactive Non-Reactive   Hepatitis C RNA, quantitative, PCR    Collection Time: 10/09/19 12:27 PM   Result Value Ref Range    HCV PCR Quantitative HCV Not Detected IU/mL    Test Information Comment    CK    Collection Time: 10/09/19 12:27 PM   Result Value Ref Range    Total CK 58 26 - 192 U/L   Protein electrophoresis, serum    Collection Time: 10/09/19  2:09 PM   Result Value Ref Range    A/G Ratio 0 81 (L) 1 10 - 1 80    Albumin Electrophoresis 44 6 (L) 52 0 - 65 0 %    Albumin CONC 3 17 (L) 3 50 - 5 00 g/dl    Alpha 1 6 2 (H) 2 5 - 5 0 %    ALPHA 1 CONC 0 44 (H) 0 10 - 0 40 g/dL    Alpha 2 17 7 (H) 7 0 - 13 0 %    ALPHA 2 CONC 1 26 (H) 0 40 - 1 20 g/dL    Beta-1 5 7 5 0 - 13 0 %    BETA 1 CONC 0 40 0 40 - 0 80 g/dL    Beta-2 6 4 2 0 - 8 0 %    BETA 2 CONC 0 45 0 20 - 0 50 g/dL    Gamma Globulin 19 4 12 0 - 22 0 %    GAMMA CONC 1 38 0 50 - 1 60 g/dL    Total Protein 7 1 6 4 - 8 2 g/dL    SPEP Interpretation       No monoclonal bands noted  Reviewed by: Anna Hawthorne MD  ([id number])  **Electronic Signature**    Diabetes Eye Exam    Collection Time: 10/15/19  4:20 PM   Result Value Ref Range    Right Eye Diabetic Retinopathy Mild     Left Eye Diabetic Retinopathy Mild    Fingerstick Glucose (POCT)    Collection Time: 10/17/19 12:10 PM   Result Value Ref Range    POC Glucose 233 (H) 65 - 140 mg/dl   Tissue Exam    Collection Time: 10/17/19 12:59 PM   Result Value Ref Range    Case Report       Surgical Pathology Report                         Case: [id number]                                   Authorizing Provider:  Teresa Miranda DO      Collected:           10/17/2019 1259              Ordering Location:     [address] [place] [address]      Received:            10/17/2019 2016 [facility] Operating Room                                                      Pathologist:           Martita Huerta MD                                                        Specimens:   A) - Artery, LEFT TEMPORAL                                                                          B) - Artery, RIGHT TEMPORAL                                                                Final Diagnosis       A    Temporal artery, left, biopsy:  -  Benign muscular walled artery without evidence of arteritis  B  Temporal artery, right, biopsy:  -  Benign muscular walled artery without evidence of arteritis  Additional Information       All controls performed with the immunohistochemical stains reported above reacted appropriately  These tests were developed and their performance characteristics determined by [facility] or [facility]  They may not be cleared or approved by the [place]  Food and Drug Administration  The FDA has determined that such clearance or approval is not necessary  These tests are used for clinical purposes  They should not be regarded as investigational or for research  This laboratory has been approved by [facility], designated as a high-complexity laboratory and is qualified to perform these tests  Interpretation performed at [facility], [address] [place] [address]        Gross Description       A  The specimen is received in formalin, labeled with the patient's name and hospital number, and is designated "left temporal artery  The specimen consists a tan-pink tubular tissue fragment measuring 1 4 cm in length by 0 2 cm in diameter  Entirely submitted  One cassette  B  The specimen is received in formalin, labeled with the patient's name and hospital number, and is designated "right temporal artery  The specimen consists of a tan-pink tubular tissue fragment measuring 1 5 cm in length by 0 1-0 2 cm in diameter  Entirely submitted  One cassette  Note: The estimated total formalin fixation time based upon information provided by the submitting clinician and the standard processing schedule is under 72 hours      [facility] Diabetes Eye Exam    Collection Time: 10/24/19  1:40 PM   Result Value Ref Range    Right Eye Diabetic Retinopathy Mild     Left Eye Diabetic Retinopathy Mild    ]    Procedure: Cta Head And Neck W Wo Contrast    Result Date: 9/20/2019  Narrative: CTA NECK AND BRAIN WITH AND WITHOUT CONTRAST INDICATION: Neuro deficit(s), subacute Vertigo, persistent, central COMPARISON:   Head CT 9/9/2019 TECHNIQUE:  Routine CT imaging of the Brain without contrast   Post contrast imaging was performed after administration of iodinated contrast through the neck and brain  Post contrast axial 0 625 mm images timed to opacify the arterial system  3D rendering was performed on an independent workstation  MIP reconstructions performed  Coronal reconstructions were performed of the noncontrast portion of the brain  Radiation dose length product (DLP) for this visit:  1808 mGy-cm   This examination, like all CT scans performed in the [facility], was performed utilizing techniques to minimize radiation dose exposure, including the use of iterative reconstruction and automated exposure control  IV Contrast:  100 mL of iohexol (OMNIPAQUE)  IMAGE QUALITY:   Diagnostic FINDINGS: NONCONTRAST BRAIN PARENCHYMA: No acute intracranial hemorrhage  No masslike lesion, mass effect effect or midline shift  No CT evidence for acute infarct  Cerebral atrophy more than expected for patient's age  VENTRICLES AND EXTRA-AXIAL SPACES:  Normal for the patient's age  VISUALIZED ORBITS AND PARANASAL SINUSES:  Unremarkable  CERVICAL VASCULATURE CT angiogram images are somewhat degraded by technique  AORTIC ARCH AND GREAT VESSELS: Aortic arch and great vessel origins are unremarkable  RIGHT VERTEBRAL ARTERY CERVICAL SEGMENT:The vessel origin is unremarkable  The vessel is patent throughout the neck without significant stenosis  LEFT VERTEBRAL ARTERY CERVICAL SEGMENT: The vessel origin is unremarkable  The vessel is patent throughout the neck without significant stenosis  RIGHT EXTRACRANIAL CAROTID SEGMENT: The carotid bifurcation and cervical internal carotid artery are unremarkable    No stenosis or dissection  LEFT EXTRACRANIAL CAROTID SEGMENT: The carotid bifurcation and cervical internal carotid artery are unremarkable    No stenosis or dissection  NASCET criteria was used to determine the degree of internal carotid artery diameter stenosis  INTRACRANIAL VASCULATURE INTERNAL CAROTID ARTERIES: There is mild atherosclerotic disease of cavernous and supraclinoid internal carotid arteries without significant stenosis  Normal ophthalmic artery origins  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 (series 6 images [id number]) ANTERIOR CIRCULATION:  Absent/hypoplastic right A1 segment  Right A2 segment is perfused through anterior communicating artery  Normal left A1 segment  Bilateral anterior cerebral arteries are unremarkable  MIDDLE CEREBRAL ARTERY CIRCULATION:  Bilateral M1 segments and major M2 branches are patent without critical stenosis  DISTAL VERTEBRAL ARTERIES:  Distal vertebral arteries are unremarkable  Normal right PICA origin  Suggestion of common trunk left AICA/PICA BASILAR ARTERY:  Basilar artery is normal in caliber  Normal superior cerebellar arteries  POSTERIOR CEREBRAL ARTERIES: Bilateral posterior cerebral arteries are unremarkable without critical stenosis  Hypoplastic bilateral posterior communicating arteries DURAL VENOUS SINUSES:  Unremarkable  NON VASCULAR ANATOMY BONY STRUCTURES: No acute or aggressive appearing osseous abnormality  SOFT TISSUES OF THE NECK:  Unremarkable  THORACIC INLET:  Unremarkable  Impression: 1  No acute intracranial CT abnormality  Cerebral atrophy more than expected for patient's age  2   Patent major intracranial vasculature without critical stenosis  3   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  The flow void in T2 sequence in same date MRI is not as pronounced as seen in CTA  There might be some venous component  Therefore MR angiogram is recommended for further evaluation  4   Unremarkable CT angiogram of the neck   The study was marked in EPIC for significant notification  Workstation performed: [id number]     Procedure: Xr Chest 1 View Portable    Result Date: 9/10/2019  Narrative: CHEST INDICATION:   shoulder pain  COMPARISON:  8/4/2018 EXAM PERFORMED/VIEWS:  XR CHEST PORTABLE FINDINGS: Cardiomediastinal silhouette appears unremarkable  The lungs are clear  No pneumothorax or pleural effusion  Osseous structures appear within normal limits for patient age  Impression: No acute cardiopulmonary disease  Workstation performed: [id number]     Procedure: Xr Forearm 2 Views Right    Result Date: 9/16/2019  Narrative: RIGHT FOREARM INDICATION:   pain and swelling  COMPARISON:  None VIEWS:  XR FOREARM 2 VW RIGHT FINDINGS: There is no acute fracture or dislocation  There are degenerative changes of the elbow and wrist  No lytic or blastic lesions are seen  Soft tissues are unremarkable  Impression: No acute osseous abnormality  Workstation performed: [id number]     Procedure: Xr Hand 3+ Views Right    Result Date: 9/16/2019  Narrative: RIGHT HAND INDICATION:   pain, swelling  COMPARISON:  None VIEWS:  XR HAND 3+ VW RIGHT For the purposes of institution wide universal language the following terms will apply: (thumb=1st digit/finger, index finger=2nd digit/finger, long finger=3rd digit/finger, ring=4th digit/finger and small finger=5th digit/finger) FINDINGS: There is no acute fracture or dislocation  Degenerative changes of 1st carpometacarpal joint, radiocarpal joint and scattered throughout the DIP and PIP joints  No lytic or blastic lesions are seen  There is dorsal soft tissue swelling overlying the hand  Impression: No acute osseous abnormality  Dorsal soft tissue swelling  Degenerative changes as described  Workstation performed: [id number]     Procedure: Ct Head Without Contrast    Result Date: 9/9/2019  Narrative: CT BRAIN - WITHOUT CONTRAST INDICATION:   Headache, acute, norm neuro exam  COMPARISON:  None  TECHNIQUE:  CT examination of the brain was performed  In addition to axial images, coronal 2D reformatted images were created and submitted for interpretation  Radiation dose length product (DLP) for this visit:  1065 mGy-cm   This examination, like all CT scans performed in the [facility], was performed utilizing techniques to minimize radiation dose exposure, including the use of iterative reconstruction and automated exposure control  IMAGE QUALITY:  Diagnostic  FINDINGS: PARENCHYMA:  No intracranial mass, mass effect or midline shift  No CT signs of acute infarction  No acute parenchymal hemorrhage  There is mild periventricular white matter low attenuation which is nonspecific and most likely related to chronic small vessel ischemic changes  VENTRICLES AND EXTRA-AXIAL SPACES:  There is prominence of the ventricles and sulci related to mild volume loss  VISUALIZED ORBITS AND PARANASAL SINUSES:  Unremarkable  CALVARIUM AND EXTRACRANIAL SOFT TISSUES:  Normal      Impression: No acute intracranial abnormality  Mild chronic small vessel ischemic changes and volume loss  Workstation performed: [id number]     Procedure: Cta Upper Extremity Right W Wo Contrast    Result Date: 9/16/2019  Narrative: CT ANGIOGRAM OF THE RIGHT UPPER EXTREMITY, WITH AND WITHOUT IV CONTRAST INDICATION:  Swelling  Weakness  Intact pulses  COMPARISON: None  TECHNIQUE:  CT angiogram examination of the chest was performed according to standard protocol  Contrast as well as noncontrast images were obtained  This examination, like all CT scans performed in the [facility], was performed utilizing techniques to minimize radiation dose exposure, including the use of iterative reconstruction and automated exposure control  3D reconstructions were performed an independent workstation, and are supplied for review    Rad dose 2317 mGy-cm IV Contrast:  100 mL of iohexol (OMNIPAQUE)  FINDINGS: VASCULAR STRUCTURES:  The aortic arch is normal with variant branching anatomy  A common trunk supplies the right brachiocephalic artery and left common carotid artery  The right subclavian artery, axillary artery, and brachial artery are widely patent  with continuous three-vessel runoff to the hand  Edema is noted in the subcutaneous fat of the distal forearm and hand without a vascular etiology  VISUALIZED STRUCTURES OF THE CHEST: 27 x 17 mm left submandibular lymph node  Impression: Normal CTA of the right upper extremity Enlarged left submandibular lymph node Workstation performed: [id number]     Procedure: Mra Head Wo Contrast    Result Date: 9/23/2019  Narrative: MRA BRAIN WITHOUT CONTRAST INDICATION:  aneurysm COMPARISON:  CTA 9/20/2019 TECHNIQUE:  Axial 3-D time-of-flight imaging with 3-D reconstructions performed without contrast    IV Contrast:  Not administered  FINDINGS: IMAGE QUALITY:  Diagnostic  ANATOMY INTERNAL CAROTID ARTERIES:  Right petrous and cavernous carotid artery are normal   ICA termination normal, ophthalmic artery origin normal   The [place] of the left cavernous carotid artery  There appear to be 2 distinct aneurysms within the cavernous carotid segment, extending laterally over the course of approximately 6 mm at the base of 2 6 mm at the height  A 2nd small focus is noted extending from the undersurface of the anterior genu of the cavernous carotid approximately a 4 mm at the base in the 3 mm in height  ANTERIOR CIRCULATION:  Left A1 is dominant, the right is markedly cystic anterior communicating artery is patent and RAUL branches within the hemispheric fissure normal  MIDDLE CEREBRAL ARTERY CIRCULATION:  The M1 segment and middle cerebral artery branches demonstrate normal flow-related enhancement  DISTAL VERTEBRAL ARTERIES:  Distal vertebral arteries are patient with a normal vertebrobasilar junction    The right posterior inferior cerebellar artery origin is not included on this exam, but appears normal on CTA 9/20/2019  Probable left AICA/PICA  BASILAR ARTERY:  Normal  POSTERIOR CEREBRAL ARTERIES:  Both posterior cerebral arteries arises from the basilar tip  Both arteries demonstrate normal flow-related enhancement  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  The proximal lesion approaches 6 x 2 6 mm, distal lesion 4 x 3 mm  No intradural aneurysm  Right A1 segment markedly hypoplastic, dominant flow to the right RAUL through the anterior communicating artery  Workstation performed: [id number]     Procedure: Mri Brain Wo Contrast    Result Date: 9/20/2019  Narrative: MRI BRAIN WITHOUT CONTRAST INDICATION: History from chart: Headaches COMPARISON:   Same date CTA head and neck and head CT 9/19/2019 TECHNIQUE:  Sagittal T1, axial T2, axial FLAIR, axial T1, axial [place] and axial diffusion imaging  IMAGE QUALITY:  Diagnostic  FINDINGS: BRAIN PARENCHYMA:  There is cerebral atrophy more than expected for patient's age  There is no discrete mass, mass effect or midline shift  There is no intracranial hemorrhage  There is no evidence of acute infarction and diffusion imaging is unremarkable  Scattered periventricular and subcortical white matter T2/FLAIR hyperintense foci, primarily involving frontal lobes VENTRICLES:  The ventricles are normal in size and contour  SELLA AND PITUITARY GLAND:  Normal  ORBITS:  Normal  PARANASAL SINUSES:  Normal  VASCULATURE:  Evaluation of the major intracranial vasculature demonstrates appropriate flow voids  CALVARIUM AND SKULL BASE:  Normal  EXTRACRANIAL SOFT TISSUES:  Normal      Impression: 1  No acute ischemic disease  2   Scattered periventricular and subcortical white matter T2/FLAIR hyperintense foci, primarily involving frontal lobes, main differential considerations include mild chronic white matter microangiopathy versus sequela of chronic migraine disease   3   Cerebral atrophy, more than expected for patient's age Workstation performed: [id number]     Procedure: Us Gallbladder    Result Date: 9/17/2019  Narrative: RIGHT UPPER QUADRANT ULTRASOUND INDICATION:     ABD PAIN, FEVER, NO RECENT SURGERY elevation alk phos and llfts acute  COMPARISON:  None TECHNIQUE:   Real-time ultrasound of the right upper quadrant was performed with a curvilinear transducer with both volumetric sweeps and still imaging techniques  FINDINGS: PANCREAS:  Visualized portions of the pancreas are within normal limits  AORTA AND IVC:  Visualized portions are normal for patient age  LIVER: Size:  Moderately enlarged  The liver measures 20 cm in the midclavicular line  Contour:  Surface contour is smooth  Parenchyma:  Echogenicity and echotexture are within normal limits  No evidence of suspicious mass  Limited imaging of the main portal vein shows it to be patent and hepatopetal   BILIARY: The gallbladder is normal in caliber  No wall thickening or pericholecystic fluid  There are multiple dependent calculi without sludge  No sonographic Mcconnell sign  No intrahepatic biliary dilatation  CBD measures 6 mm  No choledocholithiasis  KIDNEY: Right kidney measures 9 8 x 4 3 cm  Within normal limits  ASCITES:   None  Impression: 1  Hepatomegaly  2   Cholelithiasis  Workstation performed: [id number]     Procedure: Xr Chest 1 View    Result Date: 9/16/2019  Narrative: CHEST INDICATION:   cough  COMPARISON:  Chest radiograph 9/9/2019 EXAM PERFORMED/VIEWS:  XR CHEST 1 VIEW FINDINGS: Cardiomediastinal silhouette appears unremarkable  The lungs are clear  No pneumothorax or pleural effusion  Osseous structures appear within normal limits for patient age  Impression: No acute cardiopulmonary disease   Workstation performed: [id number]     Procedure: Ct Chest Abdomen Pelvis W Contrast    Result Date: 9/21/2019  Narrative: CT CHEST, ABDOMEN AND PELVIS WITH IV CONTRAST INDICATION:   Evaluate patient for malignancy- elevated alk phos, fatigue, unexplained swelling BUE  COMPARISON:  CT chest dated August 4, 2018  TECHNIQUE: CT examination of the chest, abdomen and pelvis was performed  In addition to portal venous phase postcontrast scanning through the abdomen and pelvis, delayed phase postcontrast scanning was performed through the upper abdominal viscera  Axial, sagittal, and coronal 2D reformatted images were created from the source data and submitted for interpretation  Radiation dose length product (DLP) for this visit:  1866 mGy-cm   This examination, like all CT scans performed in the [facility], was performed utilizing techniques to minimize radiation dose exposure, including the use of iterative reconstruction and automated exposure control  IV Contrast:  100 mL of iohexol (OMNIPAQUE) Enteric Contrast: Enteric contrast was administered  FINDINGS: CHEST LUNGS:  The central airways are patent  Mild bibasilar atelectasis  No suspicious mass or focal consolidation  There car stable pulmonary nodules including: -Stable 6 mm groundglass nodule in the right middle lobe (series 3 image 71) -stable 4 mm right upper lobe subpleural nodule (series 3 image 74) -stable 3 mm left upper lobe pleural-based nodule (series 3 image 64)  PLEURA:  Mild pleural thickening along the left lower lobe  No pleural effusion or pneumothorax  HEART/GREAT VESSELS:  Normal cardiac size  No pericardial effusion  Stable enlargement of the ascending thoracic aorta measuring 4 2 cm  Stable mild enlargement of the main pulmonary artery measuring 3 7 cm, a finding which may be seen in the setting of pulmonary hypertension  MEDIASTINUM AND CRISTAL:  No mediastinal lymphadenopathy  Small hiatal hernia  CHEST WALL AND LOWER NECK:   Stable scattered subcentimeter bilateral axillary lymph nodes with normal lymph node morphology and fatty hilum  No enlarged lymph nodes by imaging size criteria  ABDOMEN LIVER/BILIARY TREE:  Unremarkable  GALLBLADDER:  Multiple gallstones    No pericholecystic inflammatory changes  SPLEEN:  Unremarkable  PANCREAS:  Unremarkable  ADRENAL GLANDS:  Unremarkable  KIDNEYS/URETERS:  No hydroureteronephrosis  Small hypoattenuating lesions bilaterally, not adequately characterized, but likely cysts  STOMACH AND BOWEL:  There are scattered colonic diverticuli without evidence of diverticulitis  Moderate volume retained stool in the colon  No bowel obstruction  APPENDIX:  No findings to suggest appendicitis  ABDOMINOPELVIC CAVITY:  No ascites or free intraperitoneal air  No lymphadenopathy  VESSELS:  Unremarkable for patient's age  PELVIS REPRODUCTIVE ORGANS:  Status post hysterectomy  No suspicious adnexal mass  URINARY BLADDER:  Unremarkable  ABDOMINAL WALL/INGUINAL REGIONS:  Small fat-containing umbilical hernia  Induration of the subcutaneous fat in the anterior abdominal wall with small flecks of air, likely related to medication injection sites  OSSEOUS STRUCTURES:  No acute fracture or destructive osseous lesion  Degenerative changes of the spine  Mild degenerative arthropathy in the hips, sacroiliac joints and symphysis pubis   Impression: 1  No acute findings in the chest, abdomen or pelvis  2   Stable pulmonary nodules measuring up to 6 mm  Follow-up chest CT is recommended in 12 months to document continued stability  3  Stable enlargement of the ascending thoracic aorta measuring 4 2 cm and is stable enlargement of the main pulmonary artery measuring 3 7 cm  4   Cholelithiasis without findings of acute cholecystitis  5   Small hiatal hernia  The study is marked on Epic for follow-up evaluation  Workstation performed: [id number]     Procedure: Vas Temporal Artery Duplex    Result Date: 10/14/2019  Narrative:  [facility] REPORT CLINICAL: Indications: Patient presents with intermittent headaches x 1 year  Headaches are not localized to any particular region  She denies vision loss, jaw claudication, or arm/ shoulder pain    Recent blood work showed  elevated CRP of >90 mg/L and ESR > 130 mm/Hour  Operative History: No prior heart or vascular surgeries Risk Factors The patient has history of morbid obesity, HTN, Diabetes, asthma, osteoarthritis, sickle cell trait, pulmonary enlargement, and HLD  The patient's current BMI is 46 22, Weight is 304 lb and height is 68 in  She is a non-smoker  Clinical Right Pressure:  138/78 mm Hg, Left Pressure:  138/72 mm Hg  FINDINGS:  Segment                      Rig       Left                                                      PSV  EDV  PSV  EDV  Com  Carotid                  69   14   56   20  ICA                           61   17   59   22  Ext Carotid                   64    9   68   13  Common Superficial Temporal  107   13   89   14  Middle Temporal Artery       118   17   14    0  Frontal                       51   12   57    8  Parietal                      63   11   44    3     CONCLUSION:  Impression  RIGHT SIDE: There is no evidence of giant cell arteritis, aneurysm, or significant stenosis noted in the superficial temporal artery and its branches  LEFT SIDE: There is no evidence of giant cell arteritis, aneurysm, or significant stenosis noted in the superficial temporal artery and its branches  SIGNATURE: Electronically Signed by: Ivette Ochoa on 2019-10-14 11:58:27 AM    Procedure: Vas Upper Limb Venous Duplex Scan, Unilateral/limited    Result Date: 9/16/2019  Narrative:  [facility] REPORT CLINICAL: Indications: Patient presents with upper lower extremity pain and swelling x 5 days  Operative History: Denies Any Cardiovascular Procedures Risk Factors The patient has history of Obesity, HTN, Sickle Cell Trait,  Diabetes (Yes) and HLD  FINDINGS:  Segment       Right            Left                        Impression       Impression       Int   Jugular  Normal (Patent)  Normal (Patent)  Bas Upper     Normal                            Ceph Upper    Normal CONCLUSION:  Impression RIGHT UPPER LIMB: No evidence of acute or chronic deep vein thrombosis  No evidence of superficial thrombophlebitis noted  Doppler evaluation shows a normal response to augmentation maneuvers  LEFT UPPER LIMB LIMITED: Evaluation shows no evidence of thrombus in the internal jugular vein, subclavian vein, and the brachiocephalic vein  No previous study available for comparison  Technical findings shared with Dr Rex Kearney and faxed to chart    SIGNATURE: Electronically Signed by: Steven Espinoza on 2019-09-16 07:23:21 PM

## 2019-11-12 DIAGNOSIS — E11.65 TYPE 2 DIABETES MELLITUS WITH HYPERGLYCEMIA, UNSPECIFIED WHETHER LONG TERM INSULIN USE (HCC): ICD-10-CM

## 2019-11-12 RX ORDER — BLOOD SUGAR DIAGNOSTIC
STRIP MISCELLANEOUS
Qty: 300 EACH | Refills: 1 | Status: SHIPPED | OUTPATIENT
Start: 2019-11-12 | End: 2020-02-21 | Stop reason: SDUPTHER

## 2019-11-13 ENCOUNTER — TELEPHONE (OUTPATIENT)
Dept: NEUROLOGY | Facility: CLINIC | Age: 59
End: 2019-11-13

## 2019-11-13 NOTE — TELEPHONE ENCOUNTER
Best contact number for [id number]:[phone]    Emergency Contact name and number:  Taylor Madsen [phone]  Referring provider: Jose Villa    Referring provider Jose Villa Telephone:_[phone]_____    Primary Care Provider Name: Dr Khurram Bertrand    Reason for Appointment/Dx:headaches    Neurology Location patient would like to be seen:Any    Order received? Yes                                                 Records Received? Yes    Have you ever seen another Neurologist? No    Insurance Information    Insurance Name:Medicare A/B   ID/Policy #:[id number]    Secondary Insurance:[employer] PPO      ID/Policy#:[id number]    Workman's Comp/ Accident/ School  Information      Workman's Comp/Accident/School related?  No    If yes name of Insurance company:    Date of Injury:    Open Claim:no    [address] Name and Telephone Number:    Notes:                   Appointment date: _2/11/2020____________________________

## 2019-11-19 LAB
LEFT EYE DIABETIC RETINOPATHY: NORMAL
RIGHT EYE DIABETIC RETINOPATHY: NORMAL

## 2019-12-04 ENCOUNTER — OFFICE VISIT (OUTPATIENT)
Dept: RHEUMATOLOGY | Facility: CLINIC | Age: 59
End: 2019-12-04
Payer: COMMERCIAL

## 2019-12-04 VITALS
WEIGHT: 293 LBS | HEART RATE: 96 BPM | BODY MASS INDEX: 44.41 KG/M2 | SYSTOLIC BLOOD PRESSURE: 122 MMHG | HEIGHT: 68 IN | DIASTOLIC BLOOD PRESSURE: 84 MMHG

## 2019-12-04 DIAGNOSIS — Z79.52 CURRENT USE OF STEROID MEDICATION: ICD-10-CM

## 2019-12-04 DIAGNOSIS — G89.29 CHRONIC NONINTRACTABLE HEADACHE, UNSPECIFIED HEADACHE TYPE: ICD-10-CM

## 2019-12-04 DIAGNOSIS — IMO0001 IDDM (INSULIN DEPENDENT DIABETES MELLITUS): ICD-10-CM

## 2019-12-04 DIAGNOSIS — R51.9 CHRONIC NONINTRACTABLE HEADACHE, UNSPECIFIED HEADACHE TYPE: ICD-10-CM

## 2019-12-04 DIAGNOSIS — M35.3 PMR (POLYMYALGIA RHEUMATICA) (HCC): Primary | ICD-10-CM

## 2019-12-04 DIAGNOSIS — E66.01 CLASS 3 SEVERE OBESITY WITH BODY MASS INDEX (BMI) OF 45.0 TO 49.9 IN ADULT, UNSPECIFIED OBESITY TYPE, UNSPECIFIED WHETHER SERIOUS COMORBIDITY PRESENT (HCC): ICD-10-CM

## 2019-12-04 PROCEDURE — 99214 OFFICE O/P EST MOD 30 MIN: CPT | Performed by: INTERNAL MEDICINE

## 2019-12-04 RX ORDER — PREDNISONE 1 MG/1
15 TABLET ORAL DAILY
Qty: 90 TABLET | Refills: 2 | Status: SHIPPED | OUTPATIENT
Start: 2019-12-04 | End: 2020-02-26 | Stop reason: SDUPTHER

## 2019-12-04 NOTE — PROGRESS NOTES
Assessment and Plan:   Ms Andrew Chahal a 59-year-old  female with history significant for polymyalgia rheumatica, insulin-dependent diabetes mellitus, with complications related to retinopathy causing legal blindness bilaterally, asthma, osteoarthritis, microcytic anemia of unclear etiology and morbid obesity, who presents for follow up of an episode involving bilateral arm pain/stiffness and swelling, associated with significantly elevated inflammatory markers with a CRP greater than 90 and an ESR of greater than 130, thought to be secondary to PMR  She is currently on prednisone 20 mg once daily       # Polymyalgia rheumatica  - Thang Dennison presents today for follow-up of significantly elevated inflammatory markers, as well as abrupt onset of symptoms related to bilateral upper extremity pain/stiffness/swelling, with mild symptoms of pain noted in her pelvic girdle region  Other than persistent headaches also experienced over the past 1 year, she does not complain of additional concerning symptoms on her review of systems  Her physical examination has also not been revealing, and further evaluation such as autoimmune serologies, a bilateral temporal artery ultrasound with temporal artery biopsies, and a CT of her chest/abdomen and pelvis have all been unrevealing  Her overall presentation was thought to be consistent with polymyalgia rheumatica, and at the last office visit she was started on oral prednisone 20 mg once daily  She states after few days of taking the steroids she noticed a significant improvement in her overall joint pains, swelling and stiffness, and has not had any major recurrences of the symptoms     - In view of this I recommend we continue for treatment of PMR and begin a steroid taper  I would like her to decrease prednisone to 15 mg once daily and stay on this dose until her follow-up visit with me in 5 weeks    I advised her to call the office with any recurrence of symptoms with the steroid dose decrease  I strongly advised her to call her endocrinologist office today to make them aware that she has been started on steroids and that she has been recording high blood sugars in the 400s and 500s  She will likely need a temporary adjustment to her insulin regimen  - I also encouraged her to get the labs done ordered by me at the last office visit, as well as through her hematologist   I would like to trend the inflammatory markers to ensure they are down trending      - She will follow up with Neurology for the persistent headaches, but so far the evaluation has not revealed a rheumatic etiology for the chronic headaches  - I will see her back in the office in 5 weeks to review her response to the prednisone  I advised her to call with any concerns  # Chronic steroid use  - She is aware of the side effects associated with chronic steroid use including but not limited to, risk for infections, cataracts/glaucoma, hypertension, worsening diabetic control, gastritis, osteoporosis and avascular necrosis  I advised her to continue daily calcium and vitamin-D supplements  We will also obtain a baseline DEXA scan as she has multiple contributors for the possibility of osteoporosis including current steroid use as well as a history of insulin-dependent diabetes mellitus        Plan:  Diagnoses and all orders for this visit:    PMR (polymyalgia rheumatica) ([facility])  -     predniSONE 5 mg tablet; Take 3 tablets (15 mg total) by mouth daily  -     DXA bone density spine hip and pelvis; Future    Current use of steroid medication  -     DXA bone density spine hip and pelvis; Future    Chronic nonintractable headache, unspecified headache type    [facility] (insulin dependent [facility] mellitus) [facility]  -     DXA bone density spine hip and pelvis;  Future    Class 3 severe obesity with body mass index (BMI) of 45 0 to 49 9 in adult, unspecified obesity type, unspecified whether serious comorbidity present ([facility]    Other orders  -     metFORMIN (GLUCOPHAGE) 500 mg tablet; TAKE 2 TABLETS BY MOUTH TWO TIMES DAILY WITH MEALS      Activities as tolerated    Diet: low carb/low fat, more greens/vegetables, adequate hydration  Exercise: try to maintain a low impact exercise regimen as much as possible  Walk for 30 minutes a day for at least 3 days a week    Encouraged to maintain good sleep hygiene  Continue other medications as prescribed by PCP and other specialists        RTC in 5 weeks          HPI    INITIAL VISIT NOTE:  Ms Deepti Shelton a 41-year-old  female with history significant for insulin-dependent diabetes mellitus, with complications related to retinopathy causing legal blindness bilaterally, asthma, osteoarthritis, microcytic anemia of unclear etiology and morbid obesity, who presents for further evaluation of an episode involving bilateral arm pain/stiffness and swelling, associated with significantly elevated inflammatory markers with a CRP greater than 90 and an ESR of greater than 130   She is referred by Dr Luke Troncoso a rheumatology consult      Patient reports in the first week of September 2019, she woke up one morning with sudden onset of pain and stiffness affecting her bilateral arms, to such an extent that she was unable to move them even slightly   This was also associated with a weakness sensation of her lower legs   As her symptoms did not improve within the week she was seen in the emergency room and admitted for further evaluation   Due to the visible swelling an x-ray of her hand and forearm were initially done which only showed dorsal soft tissue swelling   An upper extremity venous duplex ruled out evidence of a deep vein thrombosis   A CTA of her right upper extremity was essentially unremarkable except for edema noted in the subcutaneous fat of the distal forearm and hand although without a vascular etiology   The aortic arch and distal arteries were unremarkable      Further lab workup showed a leukocytosis of 14, with a chronic microcytic anemia with stable hemoglobin of 9, as well as thrombocytosis with platelets ranging from 500-700   Mild transaminitis was seen which eventually resolved, although she continued to have an elevated alkaline phosphatase which is still persistent   An ESR was found to be elevated at greater than 130, with a C reactive protein of greater than 90   An JUAN screen was positive at 1:80 homogeneous pattern   A hepatitis C antibody was found to be equivocal   An anti cardiolipin IgM antibody was mildly elevated at 13   TSH, blood cultures, uric acid, urinalysis, CK, rheumatoid factor, anti CCP antibody, anti neutrophilic cytoplasmic antibody, lupus anticoagulant, Lyme disease PCR, Sjogren's antibodies, SPEP, total complement, beta 2 glycoprotein antibodies, paraneoplastic profile, anti double-stranded DNA antibody, LDH, beta 2 microglobulin, immunoglobulin assay, and direct Gerri test were unremarkable      She reports during the hospital stay she declined the use of steroids due to the insulin-dependent diabetes, or pain medication use   Her symptoms were managed with Tylenol as needed, as well as therapy   She was also evaluated by vascular surgery during her hospital stay, with the workup being unrevealing   She was discharged home and advised to follow-up with Rheumatology for further evaluation   There was a concern for temporal arteritis versus polymyalgia rheumatica given the abrupt onset of her symptoms as well as the elevated inflammatory markers   She reports the majority of her symptoms have since resolved, and lasted for an entire duration of approximately 2 weeks   She did have follow-up labs done on September 30th which showed a persistently elevated ESR of 121, but with the down trending CRP of 23 2      Patient reports since December 2018 she has been experiencing new onset headaches which were diagnosed as migraine headaches by Neurology  Juliaran Rangel states that the headache is sometimes felt at the back of her head or over her entire head, but is not usually limited to one side   She was recently seen in the emergency room for the headaches as well and no acute findings were noted on an MRI brain are MRA head   The MRI of her brain did show cerebral atrophy more than expected for the patient's age  Daysi Oracio were also changes that could be consistent with mild chronic white matter microangiopathy versus sequelae of chronic migraine disease   A CTA head and neck was also unremarkable      She reports a few months ago her weight was 336 lb, and today she is presenting with a weight of 304 lb  Randal Rangel thinks this may be related to a decreased appetite   When her symptoms started at the beginning of September she did experience jaw pain on the right side which self-resolved   She does report a history of multiple miscarriages   She denies fevers, night sweats, scalp tenderness/pain, new vision changes (she does have a history of retinopathy related to insulin-dependent diabetes mellitus, and states she is legally blind in both eyes), current jaw claudication, hair loss, sicca symptoms, unexplained skin rashes, psoriasis, photosensitivity, mouth/nose ulcers, epistaxis, recurrent sinus disease, swollen glands, pleuritic chest pain, hemoptysis (she does have a chronic history of shortness of breath and cough related to asthma), abdominal pain, vomiting, diarrhea, blood in stools, blood clots, Raynaud's, focal weakness or family history of autoimmune disease   Her sister may have some type of arthritis      She is up-to-date with a mammogram, and states Pap smears were discontinued as she has had a hysterectomy  Jeanestuardo Rangel has never had a colonoscopy         11/6/2019:  Patient presents for a follow-up today  We reviewed the ultrasound done of her bilateral temporal arteries which was unrevealing    A bilateral temporal artery biopsy did not show any inflammatory changes  Her ESR and CRP were still elevated at 95 and 20 7, respectively  Her hemoglobin was stable at 9 3  Her anti smooth muscle antibody was mildly elevated at 25  Her C3 and C4 were also mildly elevated  The remainder of the CBC, CMP (except for the alkaline phosphatase), SPEP, immunofixation, CK, aldolase, HCV RNA, HIV, ferritin, anti mitochondrial antibody, urinalysis and urine protein creatinine ratio were unremarkable      She reports since the last office visit she has had approximately 4 flare-ups of pain and stiffness affecting her predominantly from the bilateral elbows up to her shoulders  She states that this has been occurring on a weekly basis, but it does take days to resolve and overlaps with the next flare she has  She recalls in the past she has also experienced pain in her bilateral hip region, but states currently her predominant symptoms have been occurring from the elbows upwards  Only on occasion will she experience a discomfort from her wrists radiating into her hands  She denies any new complaints of joint pains or swelling      Since the last office visit she has also been experiencing increasing redness of her bilateral eyes  She did see her ophthalmologist a few weeks ago and apparently there was no inflammation detected  She is scheduled for a follow-up on November 20th      She has also been having worsening headaches which she feels in a bandlike fashion across her head  She has never been evaluated by Neurology  12/4/2019:  Patient presents for a follow-up of polymyalgia rheumatica  She is currently on oral prednisone 20 mg once daily  She did not have a chance to do her labs following the prior office visit  She reports a few days after starting the prednisone at 20 mg once daily she started to notice a significant improvement in her joint pains and swelling    She states it has also temporarily been helping with the headaches as well as the vision changes and redness of her eyes  As far she is aware she has never been told of an inflammatory eye disorder by her ophthalmologist   She reports with activities such as vacuuming and cleaning the house this has also been helping with her joint pain, but the improvement has all been amplified after starting the prednisone  Symptomatically she has been tolerating the steroids well, but on a few occasions has noticed blood sugar readings in the 400s to 500s  She has not contacted her endocrinologist with this information, and they are not aware that she has been started on steroids  She overall denies any joint pains, swelling or stiffness, but states on occasion with changes in the weather and the cooler temperatures she may feel some achiness  She continues to experience the headaches, and is scheduled for a neurology evaluation in February 2020  She was recently seen by Hematology and her lab abnormalities were all thought to be related to a reactive process  No other complaints noted at this time  The following portions of the patient's history were reviewed and updated as appropriate: allergies, current medications, past family history, past medical history, past social history, past surgical history and problem list       Review of Systems  Constitutional: Negative for weight change, fevers, chills, night sweats  Positive for fatigue  ENT/Mouth: Negative for hearing changes, ear pain, nasal congestion, sinus pain, hoarseness, sore throat, rhinorrhea, swallowing difficulty  Eyes: Negative for discharge  Positive for dryness, pain, redness and vision changes  Cardiovascular: Negative for chest pain, palpitations  Respiratory: Negative for cough, sputum, wheezing  Positive for shortness of breath  Gastrointestinal: Negative for nausea, vomiting, pain, heartburn  Positive for constipation and diarrhea  Genitourinary: Negative for dysuria, urinary frequency, hematuria  Musculoskeletal: As per HPI  Skin: Negative for skin rash, color changes  Positive for hair loss  Neuro: Negative for numbness, tingling, loss of consciousness  Positive for headaches and weakness  Psych: Negative for anxiety, depression  Heme/Lymph: Negative for easy bruising, bleeding, lymphadenopathy          Past Medical History:   Diagnosis Date    Anemia     Arthritis     Diabetes mellitus ([facility] )     Dyspnea on exertion     Hyperlipidemia     Hypertension     Legally blind 2010    Mild intermittent asthma with exacerbation 8/4/2018    Miscarriage     x 3    Seizures (HCC)     Sickle cell trait ([facility] )     Sleep apnea        Past Surgical History:   Procedure Laterality Date    CATARACT EXTRACTION Bilateral     august and september    EYE SURGERY      HYSTERECTOMY      42    OOPHORECTOMY Left     37    MT TEMPORAL ARTERY LIGATN OR BX Bilateral 10/17/2019    Procedure: BIOPSY ARTERY TEMPORAL;  Surgeon: Jloene Johnson DO;  Location: [facility];  Service: Vascular       Social History     Socioeconomic History    Marital status: /Civil Union     Spouse name: Not on file    Number of children: Not on file    Years of education: Not on file    Highest education level: Not on file   Occupational History    Occupation: employed   Social Needs    Financial resource strain: Not on file    Food insecurity:     Worry: Not on file     Inability: Not on file   [employer] needs:     Medical: Not on file     Non-medical: Not on file   Tobacco Use    Smoking status: Never Smoker    Smokeless tobacco: Never Used   Substance and Sexual Activity    Alcohol use: Never     Alcohol/week: 0 0 standard drinks     Frequency: Never     Drinks per session: Patient refused     Binge frequency: Never    Drug use: No    Sexual activity: Not Currently     Partners: Male     Birth control/protection: None   Lifestyle    Physical activity:     Days per week: Not on file     Minutes per session: Not on file    Stress: Not on file   Relationships    Social connections:     Talks on phone: Not on file     Gets together: Not on file     Attends Jew service: Not on file     Active member of club or organization: Not on file     Attends meetings of clubs or organizations: Not on file     Relationship status: Not on file    Intimate partner violence:     Fear of current or ex partner: Not on file     Emotionally abused: Not on file     Physically abused: Not on file     Forced sexual activity: Not on file   Other Topics Concern    Not on file   Social History Narrative    Caffeine use       Family History   Problem Relation Age of Onset    Heart attack Mother     Heart disease Mother     Hypertension Mother     No Known Problems Father     Heart disease Sister     No Known Problems Brother     No Known Problems Son     No Known Problems Daughter     Heart attack Maternal Grandmother     Heart disease Maternal Grandmother     Diabetes Other     Heart attack Other     No Known Problems Sister     No Known Problems Sister     No Known Problems Paternal Aunt     No Known Problems Son     Cancer Neg Hx     Stroke Neg Hx        No Known Allergies      Current Outpatient Medications:     albuterol (PROVENTIL HFA) 90 mcg/act inhaler, Inhale 2 puffs every 6 (six) hours as needed for wheezing For another 24 hours use every 4 hours standing, Disp: 6 7 g, Rfl: 0    aspirin 81 MG tablet, Take 1 tablet by mouth daily, Disp: , Rfl:     atorvastatin (LIPITOR) 40 mg tablet, Take 1 tablet (40 mg total) by mouth daily, Disp: 90 tablet, Rfl: 1    BASAGLAR KWIKPEN 100 units/mL injection pen, Inject 30 Units under the skin every 12 (twelve) hours, Disp: 5 pen, Rfl: 0    SARAH MICROLET LANCETS lancets, Inject 1 applicator into the skin 4 (four) times a day, Disp: , Rfl:     Blood Glucose Monitoring Suppl (CONTOUR NEXT MONITOR) w/Device KIT, Disp 1 meter dx E11 9, may submitted any contour next meter  If not covered let us know we can change strips, Disp: 1 kit, Rfl: 1    Blood Pressure Monitor KIT, Check blood pressures BID, Disp: 1 each, Rfl: 0    budesonide-formoterol (SYMBICORT) 80-4 5 MCG/ACT inhaler, Inhale 2 puffs 2 (two) times a day Rinse mouth after use  (Patient taking differently: Inhale 2 puffs as needed Rinse mouth after use ), Disp: 1 Inhaler, Rfl: 5    Cholecalciferol (VITAMIN D3) 3000 units TABS, Take by mouth daily , Disp: , Rfl:     CONTOUR NEXT TEST test strip, Test blood sugar 3x per day dx E11 9, Disp: 300 each, Rfl: 1    diltiazem (TIAZAC) 420 MG 24 hr capsule, Take 1 capsule (420 mg total) by mouth daily, Disp: 30 capsule, Rfl: 5    gabapentin (NEURONTIN) 100 mg capsule, Take 1 capsule in am and 2-3 capsules at night, Disp: 180 capsule, Rfl: 1    HUMALOG KWIKPEN 100 units/mL injection pen, 10 U before breakfast, 10 U before lunch, and 14 U before dinner, Disp: 10 pen, Rfl: 3    Insulin Pen Needle (BD PEN NEEDLE DERRICK U/F) 32G X 4 MM MISC, Inject 1 applicator under the skin 4 (four) times a day, Disp: , Rfl:     losartan (COZAAR) 100 MG tablet, TAKE 1 TABLET BY MOUTH EVERY DAY, Disp: 30 tablet, Rfl: 5    metFORMIN (GLUCOPHAGE) 500 mg tablet, TAKE 2 TABLETS BY MOUTH TWO TIMES DAILY WITH MEALS, Disp: , Rfl: 0    predniSONE 5 mg tablet, Take 3 tablets (15 mg total) by mouth daily, Disp: 90 tablet, Rfl: 2      Objective:    Vitals:    12/04/19 1433   BP: 122/84   Pulse: 96   Weight: 135 kg (298 lb)   Height: 5' 8" (1 727 m)       Physical Exam  General: Well appearing, well nourished, in no distress  Oriented x 3, normal mood and affect  Ambulating without difficulty  Obese  Skin: Good turgor, no rash, unusual bruising or prominent lesions  Nails: Normal color, no deformities  HEENT:  Head: Normocephalic, atraumatic  Eyes: Conjunctival erythema noted, EOM intact  Nose: No external lesions, mucosa non-inflamed  Mouth: Mucous membranes moist, no mucosal lesions    Abdomen: Soft, non-tender, non-distended, bowel sounds normal    Extremities: No amputations or deformities, cyanosis, edema  Musculoskeletal:  There is no peripheral joint soft tissue swelling visualized  Neurologic: Alert and oriented  No focal neurological deficits appreciated  Psychiatric: Normal mood and affect  DOC [place]    Rheumatology

## 2019-12-04 NOTE — PATIENT INSTRUCTIONS
Please have labs done  Please decrease prednisone to 15 mg once daily  Make sure you talk to your endocrinologist about the elevated blood sugars

## 2019-12-05 ENCOUNTER — TELEPHONE (OUTPATIENT)
Dept: OBGYN CLINIC | Facility: HOSPITAL | Age: 59
End: 2019-12-05

## 2019-12-05 ENCOUNTER — TELEPHONE (OUTPATIENT)
Dept: ENDOCRINOLOGY | Facility: CLINIC | Age: 59
End: 2019-12-05

## 2019-12-05 NOTE — TELEPHONE ENCOUNTER
Increase basaglar to 40 units twice per day  Increase humalog to 25 units before each meal and if premeal BG over 250 can take an additional 5 units of humalog

## 2019-12-05 NOTE — TELEPHONE ENCOUNTER
Send another log Monday or sooner if problems   If she has any more lows as steroids decrease go back to usual dosing

## 2019-12-05 NOTE — TELEPHONE ENCOUNTER
Pt has been taking prednisone 20 mg for a month will be going down to 15 mg, sugars have been going up high:  Test 4 times day are running in am 200's sometimes 300, lunch time 300's, dinner in 300's, sometimes higher, she is taking Humalog 20 units with meals, Basglar 35 units in am and pm

## 2019-12-05 NOTE — TELEPHONE ENCOUNTER
Caller: patient  Call back number: [phone]  Patient's doctor: Dr Ashlie Amaya    Patient called stating that forms were sent to her disability  She needs a copy of this

## 2019-12-06 ENCOUNTER — APPOINTMENT (OUTPATIENT)
Dept: LAB | Facility: HOSPITAL | Age: 59
End: 2019-12-06
Attending: INTERNAL MEDICINE
Payer: COMMERCIAL

## 2019-12-06 ENCOUNTER — TELEPHONE (OUTPATIENT)
Dept: ENDOCRINOLOGY | Facility: CLINIC | Age: 59
End: 2019-12-06

## 2019-12-06 DIAGNOSIS — IMO0002 UNCONTROLLED TYPE 2 DIABETES MELLITUS WITH OPHTHALMIC COMPLICATION, WITH LONG-TERM CURRENT USE OF INSULIN: ICD-10-CM

## 2019-12-06 DIAGNOSIS — D50.9 MICROCYTIC ANEMIA: ICD-10-CM

## 2019-12-06 DIAGNOSIS — R79.82 ELEVATED C-REACTIVE PROTEIN (CRP): ICD-10-CM

## 2019-12-06 DIAGNOSIS — R70.0 ELEVATED SED RATE (ELEV SR): ICD-10-CM

## 2019-12-06 DIAGNOSIS — D72.829 LEUKOCYTOSIS, UNSPECIFIED TYPE: ICD-10-CM

## 2019-12-06 DIAGNOSIS — M35.3 PMR (POLYMYALGIA RHEUMATICA) (HCC): ICD-10-CM

## 2019-12-06 LAB
ALBUMIN SERPL BCP-MCNC: 3.7 G/DL (ref 3–5.2)
ALP SERPL-CCNC: 150 U/L (ref 43–122)
ALT SERPL W P-5'-P-CCNC: 21 U/L (ref 9–52)
ANION GAP SERPL CALCULATED.3IONS-SCNC: 11 MMOL/L (ref 5–14)
ANISOCYTOSIS BLD QL SMEAR: PRESENT
AST SERPL W P-5'-P-CCNC: 15 U/L (ref 14–36)
BASOPHILS # BLD AUTO: 0 THOUSANDS/ΜL (ref 0–0.1)
BASOPHILS NFR BLD AUTO: 0 % (ref 0–1)
BILIRUB SERPL-MCNC: 0.6 MG/DL
BLD SMEAR INTERP: NORMAL
BUN SERPL-MCNC: 20 MG/DL (ref 5–25)
BURR CELLS BLD QL SMEAR: PRESENT
CALCIUM SERPL-MCNC: 9.5 MG/DL (ref 8.4–10.2)
CHLORIDE SERPL-SCNC: 94 MMOL/L (ref 97–108)
CO2 SERPL-SCNC: 31 MMOL/L (ref 22–30)
CREAT SERPL-MCNC: 1.04 MG/DL (ref 0.6–1.2)
CRP SERPL QL: 34.8 MG/L
DAT POLY-SP REAG RBC QL: NEGATIVE
EOSINOPHIL # BLD AUTO: 0 THOUSAND/ΜL (ref 0–0.4)
EOSINOPHIL NFR BLD AUTO: 0 % (ref 0–6)
ERYTHROCYTE [DISTWIDTH] IN BLOOD BY AUTOMATED COUNT: 18.2 %
ERYTHROCYTE [SEDIMENTATION RATE] IN BLOOD: 110 MM/HOUR (ref 1–20)
EST. AVERAGE GLUCOSE BLD GHB EST-MCNC: 220 MG/DL
FERRITIN SERPL-MCNC: 136 NG/ML (ref 8–388)
GFR SERPL CREATININE-BSD FRML MDRD: 67 ML/MIN/1.73SQ M
GLUCOSE SERPL-MCNC: 420 MG/DL (ref 70–99)
HBA1C MFR BLD: 9.3 % (ref 4.2–6.3)
HCT VFR BLD AUTO: 32.5 % (ref 36–46)
HGB BLD-MCNC: 10.2 G/DL (ref 12–16)
HYPERCHROMIA BLD QL SMEAR: PRESENT
IRON SATN MFR SERPL: 14 %
IRON SERPL-MCNC: 35 UG/DL (ref 50–170)
LDH SERPL-CCNC: 436 U/L (ref 313–618)
LYMPHOCYTES # BLD AUTO: 1 THOUSANDS/ΜL (ref 0.5–4)
LYMPHOCYTES NFR BLD AUTO: 8 % (ref 25–45)
MCH RBC QN AUTO: 23.4 PG (ref 26–34)
MCHC RBC AUTO-ENTMCNC: 31.4 G/DL (ref 31–36)
MCV RBC AUTO: 75 FL (ref 80–100)
MICROCYTES BLD QL AUTO: PRESENT
MONOCYTES # BLD AUTO: 0.3 THOUSAND/ΜL (ref 0.2–0.9)
MONOCYTES NFR BLD AUTO: 2 % (ref 1–10)
NEUTROPHILS # BLD AUTO: 11.3 THOUSANDS/ΜL (ref 1.8–7.8)
NEUTS SEG NFR BLD AUTO: 90 % (ref 45–65)
PLATELET # BLD AUTO: 455 THOUSANDS/UL (ref 150–450)
PLATELET BLD QL SMEAR: ADEQUATE
PMV BLD AUTO: 8.4 FL (ref 8.9–12.7)
POIKILOCYTOSIS BLD QL SMEAR: PRESENT
POTASSIUM SERPL-SCNC: 3.9 MMOL/L (ref 3.6–5)
PROT SERPL-MCNC: 7.3 G/DL (ref 5.9–8.4)
RBC # BLD AUTO: 4.37 MILLION/UL (ref 4–5.2)
RBC MORPH BLD: NORMAL
SODIUM SERPL-SCNC: 136 MMOL/L (ref 137–147)
TIBC SERPL-MCNC: 248 UG/DL (ref 250–450)
TSH SERPL DL<=0.05 MIU/L-ACNC: 0.74 UIU/ML (ref 0.47–4.68)
VIT B12 SERPL-MCNC: 1170 PG/ML (ref 100–900)
WBC # BLD AUTO: 12.5 THOUSAND/UL (ref 4.5–11)

## 2019-12-06 PROCEDURE — 85652 RBC SED RATE AUTOMATED: CPT

## 2019-12-06 PROCEDURE — 82607 VITAMIN B-12: CPT

## 2019-12-06 PROCEDURE — 83550 IRON BINDING TEST: CPT

## 2019-12-06 PROCEDURE — 86140 C-REACTIVE PROTEIN: CPT

## 2019-12-06 PROCEDURE — 86880 COOMBS TEST DIRECT: CPT

## 2019-12-06 PROCEDURE — 82728 ASSAY OF FERRITIN: CPT

## 2019-12-06 PROCEDURE — 85025 COMPLETE CBC W/AUTO DIFF WBC: CPT

## 2019-12-06 PROCEDURE — 83020 HEMOGLOBIN ELECTROPHORESIS: CPT

## 2019-12-06 PROCEDURE — 83010 ASSAY OF HAPTOGLOBIN QUANT: CPT

## 2019-12-06 PROCEDURE — 80053 COMPREHEN METABOLIC PANEL: CPT

## 2019-12-06 PROCEDURE — 83540 ASSAY OF IRON: CPT

## 2019-12-06 PROCEDURE — 84443 ASSAY THYROID STIM HORMONE: CPT

## 2019-12-06 PROCEDURE — 83615 LACTATE (LD) (LDH) ENZYME: CPT

## 2019-12-06 PROCEDURE — 36415 COLL VENOUS BLD VENIPUNCTURE: CPT

## 2019-12-06 PROCEDURE — 83036 HEMOGLOBIN GLYCOSYLATED A1C: CPT

## 2019-12-07 LAB — HAPTOGLOB SERPL-MCNC: 514 MG/DL (ref 34–200)

## 2019-12-08 DIAGNOSIS — E11.65 TYPE 2 DIABETES MELLITUS WITH HYPERGLYCEMIA, UNSPECIFIED WHETHER LONG TERM INSULIN USE (HCC): Primary | ICD-10-CM

## 2019-12-08 DIAGNOSIS — E78.49 OTHER HYPERLIPIDEMIA: ICD-10-CM

## 2019-12-09 DIAGNOSIS — E11.42 DIABETIC POLYNEUROPATHY ASSOCIATED WITH TYPE 2 DIABETES MELLITUS (HCC): ICD-10-CM

## 2019-12-09 LAB
DEPRECATED HGB OTHER BLD-IMP: 0 %
HGB A MFR BLD: 1.7 % (ref 1.8–3.2)
HGB A MFR BLD: 98.3 % (ref 96.4–98.8)
HGB C MFR BLD: 0 %
HGB F MFR BLD: 0 % (ref 0–2)
HGB FRACT BLD-IMP: ABNORMAL
HGB S BLD QL SOLY: NEGATIVE
HGB S MFR BLD: 0 %

## 2019-12-09 RX ORDER — GABAPENTIN 100 MG/1
CAPSULE ORAL
Qty: 90 CAPSULE | Refills: 0 | Status: SHIPPED | OUTPATIENT
Start: 2019-12-09 | End: 2020-03-09 | Stop reason: SDUPTHER

## 2019-12-09 RX ORDER — ATORVASTATIN CALCIUM 40 MG/1
TABLET, FILM COATED ORAL
Qty: 30 TABLET | Refills: 0 | Status: SHIPPED | OUTPATIENT
Start: 2019-12-09 | End: 2020-01-08

## 2019-12-18 ENCOUNTER — TELEPHONE (OUTPATIENT)
Dept: OBGYN CLINIC | Facility: HOSPITAL | Age: 59
End: 2019-12-18

## 2019-12-18 NOTE — TELEPHONE ENCOUNTER
Woo Christopher who is the , [employer] called in  Cb# [phone]    She said that she needs clarification on the patient's disability paperwork and also if she has a follow up appointment scheduled, date and time  Please advise

## 2019-12-19 ENCOUNTER — DOCUMENTATION (OUTPATIENT)
Dept: HEMATOLOGY ONCOLOGY | Facility: CLINIC | Age: 59
End: 2019-12-19

## 2019-12-20 ENCOUNTER — OFFICE VISIT (OUTPATIENT)
Dept: HEMATOLOGY ONCOLOGY | Facility: CLINIC | Age: 59
End: 2019-12-20
Payer: COMMERCIAL

## 2019-12-20 VITALS
HEART RATE: 91 BPM | BODY MASS INDEX: 44.41 KG/M2 | SYSTOLIC BLOOD PRESSURE: 126 MMHG | OXYGEN SATURATION: 99 % | HEIGHT: 68 IN | WEIGHT: 293 LBS | RESPIRATION RATE: 18 BRPM | TEMPERATURE: 98.8 F | DIASTOLIC BLOOD PRESSURE: 78 MMHG

## 2019-12-20 DIAGNOSIS — D50.9 MICROCYTIC ANEMIA: ICD-10-CM

## 2019-12-20 DIAGNOSIS — D72.829 LEUKOCYTOSIS, UNSPECIFIED TYPE: Primary | ICD-10-CM

## 2019-12-20 DIAGNOSIS — D75.839 THROMBOCYTOSIS: ICD-10-CM

## 2019-12-20 PROCEDURE — 99213 OFFICE O/P EST LOW 20 MIN: CPT | Performed by: NURSE PRACTITIONER

## 2019-12-20 RX ORDER — FERROUS SULFATE TAB EC 324 MG (65 MG FE EQUIVALENT) 324 (65 FE) MG
324 TABLET DELAYED RESPONSE ORAL EVERY OTHER DAY
Qty: 45 TABLET | Refills: 3 | Status: SHIPPED | OUTPATIENT
Start: 2019-12-20

## 2019-12-20 NOTE — PROGRESS NOTES
Hematology/Oncology Outpatient Follow-up  [place] 62 y o  female [id number]    Date:  12/20/2019      Assessment and Plan:  1  Leukocytosis, unspecified type  Patient continues to have chronic stable leukocytosis mainly neutrophilia without any hint of immature cells on the peripheral blood  Likely reactive process due to chronic significant inflammation  She does have elevated inflammatory markers  We will continue to monitor patient's laboratory studies  She will be back for follow-up in about 4 months     - CBC and differential; Future  - Comprehensive metabolic panel; Future  - C-reactive protein; Future  - Sedimentation rate, automated; Future  - LD,Blood; Future    2  Thrombocytosis ([address] )  As above  Chronic stable, secondary to inflammation  Will continue to monitor     - CBC and differential; Future  - Comprehensive metabolic panel; Future  - C-reactive protein; Future  - Sedimentation rate, automated; Future  - LD,Blood; Future    3  Microcytic anemia  The patient continues to have microcytic anemia which could be due to anemia of chronic disease as well as some iron deficiency with low serum iron and low iron saturation  Patient was advised to start an oral iron supplement on an every-other-day basis which may improve her anemia and allow for better iron utilization  We will repeat her iron panel but prior to her follow-up  The patient states that she was on oral iron in the past and tolerated it well without any adverse effects     - Iron Panel (Includes Ferritin, Iron Sat%, Iron, and TIBC); Future  - Ferritin; Future  - Vitamin B12; Future  - ferrous sulfate 324 (65 Fe) mg; Take 1 tablet (324 mg total) by mouth every other day  Dispense: 45 tablet;  Refill: 3      HPI:  This is a 64-year-old female with the history of bilateral upper extremity pain/swelling, history of seizure since childhood, asthma, obstructive sleep apnea, hypertension, diabetes, hyperlipidemia, migraines      The patient was seen in the hospital when she was admitted in September of 2019 for weakness of the upper extremities and swelling of the right upper extremity mainly  She did also have multiple other symptoms including weight loss, drenching night sweats, and severe fatigue  During the hospital stay she was found to have elevated white cell count and platelets  Her hemoglobin level was around 9 g  She had extensive workup which was negative for any hint of monoclonal gammopathy  She did have elevated sed rate and C-reactive protein among other markers  Her iron panel showed a very high ferritin level of 560 with saturation of 15%  Her JUAN test was positive  Limited hypercoagulable workup was negative for the lupus anticoagulant, cardiolipin anti lipid antibodies and beta 2 glycoprotein  The patient was recently diagnosed with the polymyalgia rheumatica by her rheumatologist and was started on prednisone  Interval history:  Patient presents today for follow-up visit  She reports significant fatigue and ongoing significant migraines  The patient states she is scheduled to see neurology in March 2020  She continues to follow closely with her rheumatologist regarding her autoimmune disorder  Patient's most recent laboratory studies from 12/06/2019 showed WBC 12 5 mainly neutrophilia, slight improvement in her microcytic anemia H&H 10 2/32 5, MCV 75, elevated platelets 415  Glucose is elevated 420, alk phos 150, sodium 136 remaining metabolic panel is normal   Hemolysis markers are normal   Her hemoglobin electrophoresis showed mildly decreased A2 which likely secondary due to iron deficiency  Iron saturation 14%, TIBC decreased for to 48, serum iron 35 ferritin 136  She continues to have significant inflammation with elevated inflammatory markers CRP 34 8 and sed rate 110  ROS: Review of Systems   Constitutional: Positive for activity change and fatigue   Negative for appetite change, chills, fever and unexpected weight change  HENT: Negative for congestion, mouth sores, nosebleeds, sore throat and trouble swallowing  Eyes: Positive for redness (Every time she gets a migraine)  Respiratory: Positive for shortness of breath  Negative for cough and chest tightness  Cardiovascular: Positive for leg swelling ( the feet at times)  Negative for chest pain and palpitations  Gastrointestinal: Positive for constipation and diarrhea  Negative for abdominal distention, abdominal pain, blood in stool, nausea and vomiting  Genitourinary: Negative for difficulty urinating, dysuria, frequency, hematuria and urgency  Musculoskeletal: Positive for arthralgias and gait problem ( ambulates with a cane)  Negative for back pain, joint swelling and myalgias  Skin: Negative for color change, pallor and rash  Neurological: Positive for dizziness and headaches  Negative for weakness, light-headedness and numbness  Hematological: Negative for adenopathy  Does not bruise/bleed easily  Psychiatric/Behavioral: Positive for dysphoric mood and sleep disturbance  The patient is not nervous/anxious          Past Medical History:   Diagnosis Date    Anemia     Arthritis     Diabetes mellitus ([facility] )     Dyspnea on exertion     Hyperlipidemia     Hypertension     Legally blind 2010    Mild intermittent asthma with exacerbation 8/4/2018    Miscarriage     x 3    Seizures (HCC)     Sick[facility] cell trait ([facility] )     Sleep apnea        Past Surgical History:   Procedure Laterality Date    CATARACT EXTRACTION Bilateral     august and september    EYE SURGERY      HYSTERECTOMY      37    OOPHORECTOMY Left     37    NH TEMPORAL ARTERY LIGATN OR BX Bilateral 10/17/2019    Procedure: BIOPSY ARTERY TEMPORAL;  Surgeon: Mansoor Puckett DO;  Location: [facility];  Service: Vascular       Social History     Socioeconomic History    Marital status: /Civil Union     Spouse name: None    Number of children: None    Years of education: None    Highest education level: None   Occupational History    Occupation: employed   Social Needs    Financial resource strain: None    Food insecurity:     Worry: None     Inability: None    Transportation needs:     Medical: None     Non-medical: None   Tobacco Use    Smoking status: Never Smoker    Smokeless tobacco: Never Used   Substance and Sexual Activity    Alcohol use: Never     Alcohol/week: 0 0 standard drinks     Frequency: Never     Drinks per session: Patient refused     Binge frequency: Never    Drug use: No    Sexual activity: Not Currently     Partners: Male     Birth control/protection: None   Lifestyle    Physical activity:     Days per week: None     Minutes per session: None    Stress: None   Relationships    Social connections:     Talks on phone: None     Gets together: None     Attends Voodoo service: None     Active member of club or organization: None     Attends meetings of clubs or organizations: None     Relationship status: None    Intimate partner violence:     Fear of current or ex partner: None     Emotionally abused: None     Physically abused: None     Forced sexual activity: None   Other Topics Concern    None   Social History Narrative    Caffeine use       Family History   Problem Relation Age of Onset    Heart attack Mother     Heart disease Mother     Hypertension Mother     No Known Problems Father     Heart disease Sister     No Known Problems Brother     No Known Problems Son     No Known Problems Daughter     Heart attack Maternal Grandmother     Heart disease Maternal Grandmother     Diabetes Other     Heart attack Other     No Known Problems Sister     No Known Problems Sister     No Known Problems Paternal Aunt     No Known Problems Son     Cancer Neg Hx     Stroke Neg Hx        No Known Allergies      Current Outpatient Medications:     albuterol (PROVENTIL HFA) 90 mcg/act inhaler, Inhale 2 puffs every 6 (six) hours as needed for wheezing For another 24 hours use every 4 hours standing, Disp: 6 7 g, Rfl: 0    aspirin 81 MG tablet, Take 1 tablet by mouth daily, Disp: , Rfl:     atorvastatin (LIPITOR) 40 mg tablet, TAKE 1 TABLET BY MOUTH EVERY DAY, Disp: 30 tablet, Rfl: 0    BASAGLAR KWIKPEN 100 units/mL injection pen, Inject 30 Units under the skin every 12 (twelve) hours, Disp: 5 pen, Rfl: 0    SARAH MICROLET LANCETS lancets, Inject 1 applicator into the skin 4 (four) times a day, Disp: , Rfl:     Blood Glucose Monitoring Suppl (CONTOUR NEXT MONITOR) w/Device KIT, Disp 1 meter dx E11 9, may submitted any contour next meter  If not covered let us know we can change strips, Disp: 1 kit, Rfl: 1    Blood Pressure Monitor KIT, Check blood pressures BID, Disp: 1 each, Rfl: 0    budesonide-formoterol (SYMBICORT) 80-4 5 MCG/ACT inhaler, Inhale 2 puffs 2 (two) times a day Rinse mouth after use   (Patient taking differently: Inhale 2 puffs as needed Rinse mouth after use ), Disp: 1 Inhaler, Rfl: 5    Cholecalciferol (VITAMIN D3) 3000 units TABS, Take by mouth daily , Disp: , Rfl:     CONTOUR NEXT TEST test strip, Test blood sugar 3x per day dx E11 9, Disp: 300 each, Rfl: 1    diltiazem (TIAZAC) 420 MG 24 hr capsule, Take 1 capsule (420 mg total) by mouth daily, Disp: 30 capsule, Rfl: 5    gabapentin (NEURONTIN) 100 mg capsule, Take 1 capsule in am and 2-3 capsules at night, Disp: 90 capsule, Rfl: 0    HUMALOG KWIKPEN 100 units/mL injection pen, 10 U before breakfast, 10 U before lunch, and 14 U before dinner, Disp: 10 pen, Rfl: 3    Insulin Pen Needle (BD PEN NEEDLE DERRICK U/F) 32G X 4 MM MISC, Inject 1 applicator under the skin 4 (four) times a day, Disp: , Rfl:     losartan (COZAAR) 100 MG tablet, TAKE 1 TABLET BY MOUTH EVERY DAY, Disp: 30 tablet, Rfl: 5    metFORMIN (GLUCOPHAGE) 500 mg tablet, TAKE 2 TABLETS BY MOUTH TWO TIMES DAILY WITH MEALS, Disp: 120 tablet, Rfl: 0    predniSONE 5 mg tablet, Take 3 tablets (15 mg total) by mouth daily, Disp: 90 tablet, Rfl: 2    ferrous sulfate 324 (65 Fe) mg, Take 1 tablet (324 mg total) by mouth every other day, Disp: 45 tablet, Rfl: 3      Physical Exam:  /78 (BP Location: Left arm, Patient Position: Sitting, Cuff Size: Adult)   Pulse 91   Temp 98 8 °F (37 1 °C) (Tympanic)   Resp 18   Ht 5' 8" (1 727 m)   Wt 135 kg (298 lb)   SpO2 99%   BMI 45 31 kg/m²     Physical Exam   Constitutional: She is oriented to person, place, and time  She appears well-developed and well-nourished  No distress  Looks exhausted   HENT:   Head: Normocephalic and atraumatic  Mouth/Throat: Oropharynx is clear and moist  No oropharyngeal exudate  Eyes: Pupils are equal, round, and reactive to light  Right conjunctiva is injected  Left conjunctiva is injected  No scleral icterus  Neck: Normal range of motion  Neck supple  No thyromegaly present  Cardiovascular: Normal rate, regular rhythm, normal heart sounds and intact distal pulses  No murmur heard  Pulmonary/Chest: Effort normal and breath sounds normal  No respiratory distress  Abdominal: Soft  Bowel sounds are normal  She exhibits no distension  There is no hepatosplenomegaly  There is no tenderness  Musculoskeletal: Normal range of motion  She exhibits no edema  Lymphadenopathy:     She has no cervical adenopathy  She has no axillary adenopathy  Neurological: She is alert and oriented to person, place, and time  Skin: Skin is warm and dry  No rash noted  She is not diaphoretic  No erythema  No pallor  Psychiatric: She has a normal mood and affect  Her behavior is normal  Judgment and thought content normal    Vitals reviewed          Labs:  Lab Results   Component Value Date    WBC 12 50 (H) 12/06/2019    HGB 10 2 (L) 12/06/2019    HCT 32 5 (L) 12/06/2019    MCV 75 (L) 12/06/2019     (H) 12/06/2019     Lab Results   Component Value Date     01/04/2016    K 3 9 12/06/2019    CL 94 (L) 12/06/2019    CO2 31 (H) 12/06/2019    ANIONGAP 5 01/04/2016    BUN 20 12/06/2019    CREATININE 1 04 12/06/2019    GLUCOSE 135 01/04/2016    GLUF 182 (H) 08/24/2019    CALCIUM 9 5 12/06/2019    AST 15 12/06/2019    ALT 21 12/06/2019    ALKPHOS 150 (H) 12/06/2019    PROT 7 7 01/04/2016    BILITOT 0 69 01/04/2016    EGFR 67 12/06/2019       Patient voiced understanding and agreement in the above discussion  Aware to contact our office with questions/symptoms in the interim  This note has been generated by voice recognition software system  Therefore, there may be spelling, grammar, and or syntax errors  Please contact if questions arise

## 2020-01-07 ENCOUNTER — PATIENT OUTREACH (OUTPATIENT)
Dept: FAMILY MEDICINE CLINIC | Facility: CLINIC | Age: 60
End: 2020-01-07

## 2020-01-07 ENCOUNTER — OFFICE VISIT (OUTPATIENT)
Dept: FAMILY MEDICINE CLINIC | Facility: CLINIC | Age: 60
End: 2020-01-07

## 2020-01-07 ENCOUNTER — APPOINTMENT (OUTPATIENT)
Dept: LAB | Facility: CLINIC | Age: 60
End: 2020-01-07
Payer: COMMERCIAL

## 2020-01-07 VITALS
BODY MASS INDEX: 44.41 KG/M2 | TEMPERATURE: 97.5 F | DIASTOLIC BLOOD PRESSURE: 70 MMHG | RESPIRATION RATE: 18 BRPM | SYSTOLIC BLOOD PRESSURE: 140 MMHG | OXYGEN SATURATION: 98 % | WEIGHT: 293 LBS | HEART RATE: 93 BPM | HEIGHT: 68 IN

## 2020-01-07 DIAGNOSIS — Z78.9 UNDER CARE OF SOCIAL WORKER: Primary | ICD-10-CM

## 2020-01-07 DIAGNOSIS — M35.3 PMR (POLYMYALGIA RHEUMATICA) (HCC): ICD-10-CM

## 2020-01-07 DIAGNOSIS — D75.839 THROMBOCYTOSIS: ICD-10-CM

## 2020-01-07 DIAGNOSIS — H54.3 VISUAL IMPAIRMENT IN BOTH EYES: Primary | ICD-10-CM

## 2020-01-07 DIAGNOSIS — G63 POLYNEUROPATHY ASSOCIATED WITH UNDERLYING DISEASE (HCC): ICD-10-CM

## 2020-01-07 DIAGNOSIS — D72.829 LEUKOCYTOSIS, UNSPECIFIED TYPE: ICD-10-CM

## 2020-01-07 DIAGNOSIS — R56.9 SEIZURES (HCC): ICD-10-CM

## 2020-01-07 DIAGNOSIS — D50.9 MICROCYTIC ANEMIA: ICD-10-CM

## 2020-01-07 DIAGNOSIS — IMO0002 UNCONTROLLED TYPE 2 DIABETES MELLITUS WITH OPHTHALMIC COMPLICATION, WITH LONG-TERM CURRENT USE OF INSULIN: ICD-10-CM

## 2020-01-07 DIAGNOSIS — G47.33 MILD OBSTRUCTIVE SLEEP APNEA: ICD-10-CM

## 2020-01-07 DIAGNOSIS — Z71.89 DIABETES EDUCATION, ENCOUNTER FOR: Primary | ICD-10-CM

## 2020-01-07 LAB
ALBUMIN SERPL BCP-MCNC: 3 G/DL (ref 3.5–5)
ALP SERPL-CCNC: 127 U/L (ref 46–116)
ALT SERPL W P-5'-P-CCNC: 19 U/L (ref 12–78)
ANION GAP SERPL CALCULATED.3IONS-SCNC: 2 MMOL/L (ref 4–13)
AST SERPL W P-5'-P-CCNC: <5 U/L (ref 5–45)
BASOPHILS # BLD AUTO: 0.08 THOUSANDS/ΜL (ref 0–0.1)
BASOPHILS NFR BLD AUTO: 1 % (ref 0–1)
BILIRUB SERPL-MCNC: 0.46 MG/DL (ref 0.2–1)
BUN SERPL-MCNC: 14 MG/DL (ref 5–25)
CALCIUM SERPL-MCNC: 9.8 MG/DL (ref 8.3–10.1)
CHLORIDE SERPL-SCNC: 107 MMOL/L (ref 100–108)
CO2 SERPL-SCNC: 32 MMOL/L (ref 21–32)
CREAT SERPL-MCNC: 0.79 MG/DL (ref 0.6–1.3)
CRP SERPL QL: 38.5 MG/L
EOSINOPHIL # BLD AUTO: 0.29 THOUSAND/ΜL (ref 0–0.61)
EOSINOPHIL NFR BLD AUTO: 2 % (ref 0–6)
ERYTHROCYTE [DISTWIDTH] IN BLOOD BY AUTOMATED COUNT: 19.5 % (ref 11.6–15.1)
ERYTHROCYTE [SEDIMENTATION RATE] IN BLOOD: 59 MM/HOUR (ref 0–20)
FERRITIN SERPL-MCNC: 121 NG/ML (ref 8–388)
GFR SERPL CREATININE-BSD FRML MDRD: 93 ML/MIN/1.73SQ M
GLUCOSE SERPL-MCNC: 123 MG/DL (ref 65–140)
HCT VFR BLD AUTO: 34.7 % (ref 34.8–46.1)
HGB BLD-MCNC: 10.2 G/DL (ref 11.5–15.4)
IMM GRANULOCYTES # BLD AUTO: 0.1 THOUSAND/UL (ref 0–0.2)
IMM GRANULOCYTES NFR BLD AUTO: 1 % (ref 0–2)
IRON SATN MFR SERPL: 19 %
IRON SERPL-MCNC: 47 UG/DL (ref 50–170)
LDH SERPL-CCNC: 177 U/L (ref 81–234)
LYMPHOCYTES # BLD AUTO: 4.26 THOUSANDS/ΜL (ref 0.6–4.47)
LYMPHOCYTES NFR BLD AUTO: 32 % (ref 14–44)
MCH RBC QN AUTO: 23.2 PG (ref 26.8–34.3)
MCHC RBC AUTO-ENTMCNC: 29.4 G/DL (ref 31.4–37.4)
MCV RBC AUTO: 79 FL (ref 82–98)
MONOCYTES # BLD AUTO: 0.71 THOUSAND/ΜL (ref 0.17–1.22)
MONOCYTES NFR BLD AUTO: 5 % (ref 4–12)
NEUTROPHILS # BLD AUTO: 7.8 THOUSANDS/ΜL (ref 1.85–7.62)
NEUTS SEG NFR BLD AUTO: 59 % (ref 43–75)
NRBC BLD AUTO-RTO: 0 /100 WBCS
PLATELET # BLD AUTO: 372 THOUSANDS/UL (ref 149–390)
PMV BLD AUTO: 10.4 FL (ref 8.9–12.7)
POTASSIUM SERPL-SCNC: 3.6 MMOL/L (ref 3.5–5.3)
PROT SERPL-MCNC: 7 G/DL (ref 6.4–8.2)
RBC # BLD AUTO: 4.4 MILLION/UL (ref 3.81–5.12)
SODIUM SERPL-SCNC: 141 MMOL/L (ref 136–145)
TIBC SERPL-MCNC: 244 UG/DL (ref 250–450)
VIT B12 SERPL-MCNC: 1407 PG/ML (ref 100–900)
WBC # BLD AUTO: 13.24 THOUSAND/UL (ref 4.31–10.16)

## 2020-01-07 PROCEDURE — 83615 LACTATE (LD) (LDH) ENZYME: CPT

## 2020-01-07 PROCEDURE — 36415 COLL VENOUS BLD VENIPUNCTURE: CPT

## 2020-01-07 PROCEDURE — 83540 ASSAY OF IRON: CPT

## 2020-01-07 PROCEDURE — 80053 COMPREHEN METABOLIC PANEL: CPT

## 2020-01-07 PROCEDURE — 85025 COMPLETE CBC W/AUTO DIFF WBC: CPT

## 2020-01-07 PROCEDURE — 99203 OFFICE O/P NEW LOW 30 MIN: CPT | Performed by: PHYSICIAN ASSISTANT

## 2020-01-07 PROCEDURE — 82607 VITAMIN B-12: CPT

## 2020-01-07 PROCEDURE — 86140 C-REACTIVE PROTEIN: CPT

## 2020-01-07 PROCEDURE — 83550 IRON BINDING TEST: CPT

## 2020-01-07 PROCEDURE — 85652 RBC SED RATE AUTOMATED: CPT

## 2020-01-07 PROCEDURE — 82728 ASSAY OF FERRITIN: CPT

## 2020-01-07 RX ORDER — INSULIN LISPRO 100 [IU]/ML
INJECTION, SOLUTION INTRAVENOUS; SUBCUTANEOUS
Qty: 10 PEN | Refills: 3 | Status: SHIPPED | OUTPATIENT
Start: 2020-01-07 | End: 2020-02-21 | Stop reason: SDUPTHER

## 2020-01-07 RX ORDER — INSULIN GLARGINE 100 [IU]/ML
40 INJECTION, SOLUTION SUBCUTANEOUS EVERY 12 HOURS SCHEDULED
Qty: 5 PEN | Refills: 1 | Status: SHIPPED | OUTPATIENT
Start: 2020-01-07 | End: 2020-01-23 | Stop reason: SDUPTHER

## 2020-01-07 NOTE — PROGRESS NOTES
MITZI ONEILL received a referral from patient's PCP regarding assistance with transportation  MITZI ONEILL met with patient to assess  Patient states she is legally blind and needs assistance completing application for [employer]  Patient reports she has been using the [employer] system and getting [employer] as a means of transportation  The bus has been challenging, according to patient, due to having to cross busy streets  [employer] rides have been getting expensive for patient  Patient shared she has used [employer] in the past but it is no longer active  Patient is requesting assistance with the completion of a new application  Patient was agreeable to having [facility] come to the home to assist with the [employer] application  Patient states she has the support of her  and son  Patient denies having additional concerns at this time  MITZI ONEILL has placed a referral for [facility]  MITZI ONEILL will continue to remain available for additional assistance/support as needed

## 2020-01-07 NOTE — ASSESSMENT & PLAN NOTE
Lab Results   Component Value Date    HGBA1C 9 3 (H) 12/06/2019     She should continue follow-up with Endocrinology she is going to be sending them her to come in her readings for this month  Insulin was recently increased and she states that she has been with it however her blood sugars have continued to be elevated

## 2020-01-07 NOTE — ASSESSMENT & PLAN NOTE
She was given CPAP in the past however the mask had caused her irritation and therefore since she was not using the CPAP they had taken aback    I did discuss the complications of uncontrolled sleep apnea and in the future she would like to go back to talk to Dr Dione Sebastian about it again

## 2020-01-07 NOTE — PROGRESS NOTES
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, with long-term current use of insulin (HCC)    Lab Results   Component Value Date    HGBA1C 9 3 (H) 12/06/2019     She should continue follow-up with Endocrinology she is going to be sending them her to come in her readings for this month  Insulin was recently increased and she states that she has been with it however her blood sugars have continued to be elevated  Mild obstructive sleep apnea    She was given CPAP in the past however the mask had caused her irritation and therefore since she was not using the CPAP they had taken aback  I did discuss the complications of uncontrolled sleep apnea and in the future she would like to go back to talk to Dr Elia Ray about it again    Seizures [facility])   She has a history of absence seizures as a child  She denies any seizure-like activity and does recall the last time she has had a seizure  She is scheduled to see Neurology in February  Problem List Items Addressed This Visit        Endocrine    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, with long-term current use of insulin ([address] )       Lab Results   Component Value Date    HGBA1C 9 3 (H) 12/06/2019     She should continue follow-up with Endocrinology she is going to be sending them her to come in her readings for this month  Insulin was recently increased and she states that she has been with it however her blood sugars have continued to be elevated  Relevant Medications    HUMALOG KWIKPEN 100 units/mL injection pen    BASAGLAR KWIKPEN 100 units/mL injection pen       Respiratory    Mild obstructive sleep apnea       She was given CPAP in the past however the mask had caused her irritation and therefore since she was not using the CPAP they had taken aback    I did discuss the complications of uncontrolled sleep apnea and in the future she would like to go back to talk to Dr Elia Ray about it again            Nervous and Auditory    Polyneuropathy associated with underlying disease ([facility] )       Hematopoietic and Hemostatic    Thrombocytosis ([facility] )       Other    Seizures ([facility] )      She has a history of absence seizures as a child  She denies any seizure-like activity and does recall the last time she has had a seizure  She is scheduled to see Neurology in February  Visual impairment in both eyes - Primary    Relevant Orders    Ambulatory referral to social work care management program    PMR (polymyalgia rheumatica) ([facility] )            Subjective:      Patient ID: Mario Marcano is a 62 y o  female  HPI  62year old female here for new patient visit  She has history diabetes with insulin use, hypertension ,seizures and sleep apnea  She does see Endocrinology for her diabetes  She is using 25 units of humalog with meals  She is using 40 units of basaglar twice a day  Her blood sugars have been running and can range the 200s sometimes above 400  She has had a few random hypoglycemic events  She also sees Hematology Oncology a leukocytosis,  Thrombocytosis mild anemia  She was hospitalized in September of 2019 for upper extremity weakness and swelling  She did also weight loss, night sweats and fatigue  She was negative for monoclonal gammopathy and was found to have elevated sed rate and CRP  She also was found to have a positive JUAN  She was negative for lupus anticoagulant  She does see Rheumatology and is being treated for polymyalgia rheumatica  She did have a bilateral temporal artery ultrasound with biopsy and CT chest abdomen and pelvis which have all been negative  She was started prednisone in November and her symptoms did improve  She was therefore continued on prednisone of 15 mg daily  She was advised to get DEXA scan and is scheduled for 1  She has been having headaches and problems with her eyes  Most pain is over left eye and then she cant see out o it   She as photsensitivity  She is seeing them tomorrow with rheumatology  She does have sleep papnea but does not use a cpap due to irritation from the mask  She is allergic to silicone  They took back her CPAP machine machine because she wasn't using it  The following portions of the patient's history were reviewed and updated as appropriate:   She  has a past medical history of Anemia, Arthritis, Dyspnea on exertion, Hyperlipidemia, Hypertension, Legally blind (2010), Mild intermittent asthma with exacerbation (8/4/2018), Miscarriage, Seizures ([facility] ), Sickle cell trait ([facility] ), and Sleep apnea    She   Patient Active Problem List    Diagnosis Date Noted    Polyneuropathy associated with underlying disease ([facility] ) 01/07/2020    PMR (polymyalgia rheumatica) ([facility] ) 01/07/2020    Thrombocytosis ([facility] ) 01/07/2020    Morbid obesity ([facility] ) 09/19/2019    Other inflammatory and immune myopathies, not elsewhere classified 09/16/2019    Transaminitis 09/16/2019    Frequent headaches 07/09/2019    Type 2 diabetes mellitus with microalbuminuria, with long-term current use of insulin ([facility] ) 02/01/2019    Mild intermittent asthma with exacerbation 08/04/2018    Orthostatic hypotension 06/25/2018    Lung nodules 03/22/2018    Exertional dyspnea 02/13/2018    Enlarged pulmonary artery ([facility] ) 02/13/2018    Aortic dilatation ([facility] ) 02/13/2018    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, with long-term current use of insulin ([facility])     Essential hypertension     Seizures ([facility] )     Mild obstructive sleep apnea 12/18/2017    Prolonged QT interval 12/18/2017    Decreased pulses in feet 12/11/2017    Diabetes mellitus type 2 with complications, uncontrolled ([facility] ) 09/08/2015    Microalbuminuria 09/08/2015    Vitamin D deficiency 06/06/2014    Visual impairment in both eyes 12/06/2012    Anemia 03/20/2012    Hyperlipidemia 03/20/2012    Class 3 severe obesity with serious comorbidity and body mass index (BMI) of 45 0 to 49 9 in adult [facility]) 03/20/2012    Peripheral neuropathy 03/20/2012     She  has a past surgical history that includes Cataract extraction (Bilateral); Eye surgery; Hysterectomy; Oophorectomy (Left); and pr temporal artery ligatn or bx (Bilateral, 10/17/2019)  Her family history includes Diabetes in her other; Heart attack in her maternal grandmother, mother, and other; Heart disease in her maternal grandmother, mother, and sister; Hypertension in her mother; No Known Problems in her brother, daughter, father, paternal aunt, sister, sister, son, and son  She  reports that she has never smoked  She has never used smokeless tobacco  She reports that she does not drink alcohol or use drugs  Current Outpatient Medications   Medication Sig Dispense Refill    albuterol (PROVENTIL HFA) 90 mcg/act inhaler Inhale 2 puffs every 6 (six) hours as needed for wheezing For another 24 hours use every 4 hours standing 6 7 g 0    aspirin 81 MG tablet Take 1 tablet by mouth daily      atorvastatin (LIPITOR) 40 mg tablet TAKE 1 TABLET BY MOUTH EVERY DAY 30 tablet 0    BASAGLAR KWIKPEN 100 units/mL injection pen Inject 40 Units under the skin every 12 (twelve) hours 5 pen 1    SARAH MICROLET LANCETS lancets Inject 1 applicator into the skin 4 (four) times a day      Blood Glucose Monitoring Suppl (CONTOUR NEXT MONITOR) w/Device KIT Disp 1 meter dx E11 9, may submitted any contour next meter  If not covered let us know we can change strips 1 kit 1    Blood Pressure Monitor KIT Check blood pressures BID 1 each 0    budesonide-formoterol (SYMBICORT) 80-4 5 MCG/ACT inhaler Inhale 2 puffs 2 (two) times a day Rinse mouth after use   (Patient taking differently: Inhale 2 puffs as needed Rinse mouth after use ) 1 Inhaler 5    Cholecalciferol (VITAMIN D3) 3000 units TABS Take by mouth daily       CONTOUR NEXT TEST test strip Test blood sugar 3x per day dx E11 9 300 each 1    diltiazem (TIAZAC) 420 MG 24 hr capsule Take 1 capsule (420 mg total) by mouth daily 30 capsule 5    ferrous sulfate 324 (65 Fe) mg Take 1 tablet (324 mg total) by mouth every other day 45 tablet 3    gabapentin (NEURONTIN) 100 mg capsule Take 1 capsule in am and 2-3 capsules at night 90 capsule 0    HUMALOG KWIKPEN 100 units/mL injection pen Take 25 units before meals 10 pen 3    Insulin Pen Needle (BD PEN NEEDLE DERRICK U/F) 32G X 4 MM MISC Inject 1 applicator under the skin 4 (four) times a day      losartan (COZAAR) 100 MG tablet TAKE 1 TABLET BY MOUTH EVERY DAY 30 tablet 5    predniSONE 5 mg tablet Take 3 tablets (15 mg total) by mouth daily 90 tablet 2     No current facility-administered medications for this visit  Current Outpatient Medications on File Prior to Visit   Medication Sig    albuterol (PROVENTIL HFA) 90 mcg/act inhaler Inhale 2 puffs every 6 (six) hours as needed for wheezing For another 24 hours use every 4 hours standing    aspirin 81 MG tablet Take 1 tablet by mouth daily    atorvastatin (LIPITOR) 40 mg tablet TAKE 1 TABLET BY MOUTH EVERY DAY    [employer] MICROLET LANCETS lancets Inject 1 applicator into the skin 4 (four) times a day    Blood Glucose Monitoring Suppl (CONTOUR NEXT MONITOR) w/Device KIT Disp 1 meter dx E11 9, may submitted any contour next meter  If not covered let us know we can change strips    Blood Pressure Monitor KIT Check blood pressures BID    budesonide-formoterol (SYMBICORT) 80-4 5 MCG/ACT inhaler Inhale 2 puffs 2 (two) times a day Rinse mouth after use   (Patient taking differently: Inhale 2 puffs as needed Rinse mouth after use )    Cholecalciferol (VITAMIN D3) 3000 units TABS Take by mouth daily     CONTOUR NEXT TEST test strip Test blood sugar 3x per day dx E11 9    diltiazem (TIAZAC) 420 MG 24 hr capsule Take 1 capsule (420 mg total) by mouth daily    ferrous sulfate 324 (65 Fe) mg Take 1 tablet (324 mg total) by mouth every other day    gabapentin (NEURONTIN) 100 mg capsule Take 1 capsule in am and 2-3 capsules at night    Insulin Pen Needle (BD PEN NEEDLE DERRICK U/F) 32G X 4 MM MISC Inject 1 applicator under the skin 4 (four) times a day    losartan (COZAAR) 100 MG tablet TAKE 1 TABLET BY MOUTH EVERY DAY    predniSONE 5 mg tablet Take 3 tablets (15 mg total) by mouth daily     No current facility-administered medications on file prior to visit  She has No Known Allergies       Review of Systems   Constitutional: Positive for fatigue  Negative for chills and fever  Eyes: Positive for photophobia, pain and visual disturbance  Cardiovascular: Positive for leg swelling (chronic and unchanged)  Musculoskeletal: Positive for arthralgias  Neurological: Positive for tremors, numbness and headaches  Negative for seizures  Objective:      /70 (BP Location: Left arm, Patient Position: Sitting, Cuff Size: Large)   Pulse 93   Temp 97 5 °F (36 4 °C) (Temporal)   Resp 18   Ht 5' 8" (1 727 m)   Wt (!) 138 kg (305 lb)   SpO2 98%   Breastfeeding? No   BMI 46 38 kg/m²          Physical Exam   Constitutional: She is oriented to person, place, and time  She appears well-developed and well-nourished  No distress  HENT:   Head: Normocephalic and atraumatic  Right Ear: External ear normal    Left Ear: External ear normal    Eyes: Conjunctivae are normal    Frequent squinting and blinking   Neck: Normal range of motion  Neck supple  No thyromegaly present  Cardiovascular: Normal rate, regular rhythm and normal heart sounds  No murmur heard  +1 bilateral pitting edema   Pulmonary/Chest: Effort normal and breath sounds normal  No respiratory distress  She has no wheezes  Lymphadenopathy:     She has no cervical adenopathy  Neurological: She is alert and oriented to person, place, and time  Psychiatric: She has a normal mood and affect  Her behavior is normal    Nursing note and vitals reviewed

## 2020-01-08 ENCOUNTER — OFFICE VISIT (OUTPATIENT)
Dept: RHEUMATOLOGY | Facility: CLINIC | Age: 60
End: 2020-01-08
Payer: COMMERCIAL

## 2020-01-08 VITALS
WEIGHT: 293 LBS | HEART RATE: 90 BPM | HEIGHT: 68 IN | DIASTOLIC BLOOD PRESSURE: 72 MMHG | BODY MASS INDEX: 44.41 KG/M2 | SYSTOLIC BLOOD PRESSURE: 142 MMHG

## 2020-01-08 DIAGNOSIS — G89.29 CHRONIC NONINTRACTABLE HEADACHE, UNSPECIFIED HEADACHE TYPE: ICD-10-CM

## 2020-01-08 DIAGNOSIS — R70.0 ELEVATED SED RATE: ICD-10-CM

## 2020-01-08 DIAGNOSIS — M35.3 POLYMYALGIA RHEUMATICA (HCC): Primary | ICD-10-CM

## 2020-01-08 DIAGNOSIS — M35.3 PMR (POLYMYALGIA RHEUMATICA) (HCC): ICD-10-CM

## 2020-01-08 DIAGNOSIS — R51.9 CHRONIC NONINTRACTABLE HEADACHE, UNSPECIFIED HEADACHE TYPE: ICD-10-CM

## 2020-01-08 DIAGNOSIS — Z79.52 CURRENT CHRONIC USE OF SYSTEMIC STEROIDS: ICD-10-CM

## 2020-01-08 DIAGNOSIS — E78.49 OTHER HYPERLIPIDEMIA: ICD-10-CM

## 2020-01-08 DIAGNOSIS — IMO0001 IDDM (INSULIN DEPENDENT DIABETES MELLITUS): ICD-10-CM

## 2020-01-08 DIAGNOSIS — E66.01 CLASS 3 SEVERE OBESITY WITH BODY MASS INDEX (BMI) OF 45.0 TO 49.9 IN ADULT, UNSPECIFIED OBESITY TYPE, UNSPECIFIED WHETHER SERIOUS COMORBIDITY PRESENT (HCC): ICD-10-CM

## 2020-01-08 DIAGNOSIS — R79.82 ELEVATED C-REACTIVE PROTEIN: ICD-10-CM

## 2020-01-08 PROCEDURE — 99214 OFFICE O/P EST MOD 30 MIN: CPT | Performed by: INTERNAL MEDICINE

## 2020-01-08 RX ORDER — ASPIRIN 81 MG/1
81 TABLET ORAL DAILY
COMMUNITY
Start: 2017-12-11

## 2020-01-08 RX ORDER — LABETALOL 200 MG/1
200 TABLET, FILM COATED ORAL DAILY
COMMUNITY
Start: 2017-12-27 | End: 2020-02-12 | Stop reason: HOSPADM

## 2020-01-08 RX ORDER — VALSARTAN AND HYDROCHLOROTHIAZIDE 320; 12.5 MG/1; MG/1
1 TABLET, FILM COATED ORAL DAILY
COMMUNITY
Start: 2017-12-25 | End: 2020-02-12 | Stop reason: HOSPADM

## 2020-01-08 RX ORDER — PREDNISONE 2.5 MG
TABLET ORAL
Qty: 14 TABLET | Refills: 0 | Status: SHIPPED | OUTPATIENT
Start: 2020-01-08 | End: 2020-01-23 | Stop reason: ALTCHOICE

## 2020-01-08 RX ORDER — PREDNISONE 20 MG/1
TABLET ORAL
Qty: 60 TABLET | Refills: 0 | OUTPATIENT
Start: 2020-01-08

## 2020-01-08 RX ORDER — HYDROCHLOROTHIAZIDE 25 MG/1
25 TABLET ORAL DAILY
COMMUNITY
Start: 2014-05-06 | End: 2020-09-08 | Stop reason: SDUPTHER

## 2020-01-08 RX ORDER — LISINOPRIL 20 MG/1
40 TABLET ORAL
COMMUNITY
Start: 2014-05-09 | End: 2020-02-12 | Stop reason: HOSPADM

## 2020-01-08 RX ORDER — SIMVASTATIN 40 MG
40 TABLET ORAL
COMMUNITY
End: 2020-02-12 | Stop reason: HOSPADM

## 2020-01-08 RX ORDER — AMLODIPINE BESYLATE 10 MG/1
10 TABLET ORAL DAILY
COMMUNITY
Start: 2015-12-10 | End: 2020-02-12 | Stop reason: HOSPADM

## 2020-01-08 RX ORDER — ATORVASTATIN CALCIUM 40 MG/1
TABLET, FILM COATED ORAL
Qty: 30 TABLET | Refills: 0 | Status: SHIPPED | OUTPATIENT
Start: 2020-01-08 | End: 2020-02-24 | Stop reason: SDUPTHER

## 2020-01-08 NOTE — PATIENT INSTRUCTIONS
Please decrease prednisone to 12 5 mg daily for 2 weeks, and then decrease to 10 mg daily and stay on this dose till your follow up with me

## 2020-01-08 NOTE — PROGRESS NOTES
Assessment and Plan:   Ms Guo Gist a 56-year-old  female with history significant for polymyalgia rheumatica, insulin-dependent diabetes mellitus, with complications related to retinopathy causing legal blindness bilaterally, asthma, osteoarthritis, microcytic anemia of unclear etiology and morbid obesity, who presents for follow up of an episode involving bilateral arm pain/stiffness and swelling, associated with significantly elevated inflammatory markers with a CRP greater than 90 and an ESR of greater than 130, thought to be secondary to PMR  She is currently on prednisone 15 mg once daily       # Polymyalgia rheumatica  - Edie presents today for follow-up of significantly elevated inflammatory markers, as well as abrupt onset of symptoms related to bilateral upper extremity pain/stiffness/swelling, with mild symptoms of pain noted in her pelvic girdle region   Other than persistent headaches also experienced over the past 1 year, she does not complain of additional concerning symptoms on her review of systems   Her physical examination has also not been revealing, and further evaluation such as autoimmune serologies, a bilateral temporal artery ultrasound with temporal artery biopsies, and a CT of her chest/abdomen and pelvis have all been unrevealing  Her overall presentation was thought to be consistent with polymyalgia rheumatica  She states after few days of taking the initial steroids she noticed a significant improvement in her overall joint pains, swelling and stiffness, and has not had any major recurrences of the symptoms      - In view of this I recommend we continue for treatment of PMR and begin a steroid taper  I would like her to decrease prednisone to 12 5 mg once daily for 2 weeks and then further decrease to 10 mg once daily which she will stay on until her follow-up visit with me in 6 weeks    I advised her to call the office with any recurrence of symptoms with the steroid dose decrease  She will continue follow-up with endocrinology in view of the hyperglycemia as well        - She will follow up with Neurology for the persistent headaches, but so far the evaluation has not revealed a rheumatic etiology for the chronic headaches      - I will see her back in the office in 6 weeks to review her response to the prednisone  I advised her to call with any concerns      # Chronic steroid use  - She is aware of the side effects associated with chronic steroid use including but not limited to, risk for infections, cataracts/glaucoma, hypertension, worsening diabetic control, gastritis, osteoporosis and avascular necrosis  I advised her to continue daily calcium and vitamin-D supplements  We will also obtain a baseline DEXA scan as she has multiple contributors for the possibility of osteoporosis including current steroid use as well as a history of insulin-dependent diabetes mellitus        Plan:  Diagnoses and all orders for this visit:    Polymyalgia rheumatica ([facility] [address] )  -     predniSONE 2 5 mg tablet; Take 12 5 mg daily x 2 weeks  Combine with 5 mg pill  Current chronic use of systemic steroids    Chronic nonintractable headache, unspecified headache type    IDDM (insulin dependent diabetes mellitus) ([facility])    Class 3 severe obesity with body mass index (BMI) of 45 0 to 49 9 in adult, unspecified obesity type, unspecified whether serious comorbidity present ([facility])    Elevated sed rate    Elevated C-reactive protein      Activities as tolerated    Diet: low carb/low fat, more greens/vegetables, adequate hydration  Exercise: try to maintain a low impact exercise regimen as much as possible  Walk for 30 minutes a day for at least 3 days a week    Encouraged to maintain good sleep hygiene  Continue other medications as prescribed by PCP and other specialists        RTC in 6 weeks          HPI    INITIAL VISIT NOTE:  Ms Jones Noah a 49-year-old  female with history significant for insulin-dependent diabetes mellitus, with complications related to retinopathy causing legal blindness bilaterally, asthma, osteoarthritis, microcytic anemia of unclear etiology and morbid obesity, who presents for further evaluation of an episode involving bilateral arm pain/stiffness and swelling, associated with significantly elevated inflammatory markers with a CRP greater than 90 and an ESR of greater than 130   She is referred by Dr Stephens Manifold a rheumatology consult      Patient reports in the first week of September 2019, she woke up one morning with sudden onset of pain and stiffness affecting her bilateral arms, to such an extent that she was unable to move them even slightly   This was also associated with a weakness sensation of her lower legs   As her symptoms did not improve within the week she was seen in the emergency room and admitted for further evaluation   Due to the visible swelling an x-ray of her hand and forearm were initially done which only showed dorsal soft tissue swelling   An upper extremity venous duplex ruled out evidence of a deep vein thrombosis   A CTA of her right upper extremity was essentially unremarkable except for edema noted in the subcutaneous fat of the distal forearm and hand although without a vascular etiology   The aortic arch and distal arteries were unremarkable      Further lab workup showed a leukocytosis of 14, with a chronic microcytic anemia with stable hemoglobin of 9, as well as thrombocytosis with platelets ranging from 500-700   Mild transaminitis was seen which eventually resolved, although she continued to have an elevated alkaline phosphatase which is still persistent   An ESR was found to be elevated at greater than 130, with a C reactive protein of greater than 90   An JUAN screen was positive at 1:80 homogeneous pattern   A hepatitis C antibody was found to be equivocal   An anti cardiolipin IgM antibody was mildly elevated at 13   TSH, blood cultures, uric acid, urinalysis, CK, rheumatoid factor, anti CCP antibody, anti neutrophilic cytoplasmic antibody, lupus anticoagulant, Lyme disease PCR, Sjogren's antibodies, SPEP, total complement, beta 2 glycoprotein antibodies, paraneoplastic profile, anti double-stranded DNA antibody, LDH, beta 2 microglobulin, immunoglobulin assay, and direct Gerri test were unremarkable      She reports during the hospital stay she declined the use of steroids due to the insulin-dependent diabetes, or pain medication use   Her symptoms were managed with Tylenol as needed, as well as therapy   She was also evaluated by vascular surgery during her hospital stay, with the workup being unrevealing   She was discharged home and advised to follow-up with Rheumatology for further evaluation  Daysi Narayanan was a concern for temporal arteritis versus polymyalgia rheumatica given the abrupt onset of her symptoms as well as the elevated inflammatory markers   She reports the majority of her symptoms have since resolved, and lasted for an entire duration of approximately 2 weeks   She did have follow-up labs done on September 30th which showed a persistently elevated ESR of 121, but with the down trending CRP of 23 2      Patient reports since December 2018 she has been experiencing new onset headaches which were diagnosed as migraine headaches by Neurology  Randal Rangel states that the headache is sometimes felt at the back of her head or over her entire head, but is not usually limited to one side   She was recently seen in the emergency room for the headaches as well and no acute findings were noted on an MRI brain are MRA head   The MRI of her brain did show cerebral atrophy more than expected for the patient's age  Daysi Narayanan were also changes that could be consistent with mild chronic white matter microangiopathy versus sequelae of chronic migraine disease   A CTA head and neck was also unremarkable      She reports a few months ago her weight was 336 lb, and today she is presenting with a weight of 304 lb  Randal Rangel thinks this may be related to a decreased appetite   When her symptoms started at the beginning of September she did experience jaw pain on the right side which self-resolved   She does report a history of multiple miscarriages   She denies fevers, night sweats, scalp tenderness/pain, new vision changes (she does have a history of retinopathy related to insulin-dependent diabetes mellitus, and states she is legally blind in both eyes), current jaw claudication, hair loss, sicca symptoms, unexplained skin rashes, psoriasis, photosensitivity, mouth/nose ulcers, epistaxis, recurrent sinus disease, swollen glands, pleuritic chest pain, hemoptysis (she does have a chronic history of shortness of breath and cough related to asthma), abdominal pain, vomiting, diarrhea, blood in stools, blood clots, Raynaud's, focal weakness or family history of autoimmune disease   Her sister may have some type of arthritis      She is up-to-date with a mammogram, and states Pap smears were discontinued as she has had a hysterectomy  Randal Rangel has never had a colonoscopy         11/6/2019:  Patient presents for a follow-up today  Palak Speaks reviewed the ultrasound done of her bilateral temporal arteries which was unrevealing   A bilateral temporal artery biopsy did not show any inflammatory changes   Her ESR and CRP were still elevated at 95 and 20 7, respectively   Her hemoglobin was stable at 9 3   Her anti smooth muscle antibody was mildly elevated at 25   Her C3 and C4 were also mildly elevated   The remainder of the CBC, CMP (except for the alkaline phosphatase), SPEP, immunofixation, CK, aldolase, HCV RNA, HIV, ferritin, anti mitochondrial antibody, urinalysis and urine protein creatinine ratio were unremarkable      She reports since the last office visit she has had approximately 4 flare-ups of pain and stiffness affecting her predominantly from the bilateral elbows up to her shoulders   She states that this has been occurring on a weekly basis, but it does take days to resolve and overlaps with the next flare she has  June Drummonds recalls in the past she has also experienced pain in her bilateral hip region, but states currently her predominant symptoms have been occurring from the elbows upwards  Miri Rancher on occasion will she experience a discomfort from her wrists radiating into her hands  Junemoses Babcockriching denies any new complaints of joint pains or swelling      Since the last office visit she has also been experiencing increasing redness of her bilateral eyes   She did see her ophthalmologist a few weeks ago and apparently there was no inflammation detected  June Ramirez is scheduled for a follow-up on November 20th      She has also been having worsening headaches which she feels in a bandlike fashion across her head  Cohagenmoses Ramirez has never been evaluated by Neurology         12/4/2019:  Patient presents for a follow-up of polymyalgia rheumatica  She is currently on oral prednisone 20 mg once daily  She did not have a chance to do her labs following the prior office visit      She reports a few days after starting the prednisone at 20 mg once daily she started to notice a significant improvement in her joint pains and swelling  She states it has also temporarily been helping with the headaches as well as the vision changes and redness of her eyes  As far she is aware she has never been told of an inflammatory eye disorder by her ophthalmologist   She reports with activities such as vacuuming and cleaning the house this has also been helping with her joint pain, but the improvement has all been amplified after starting the prednisone  Symptomatically she has been tolerating the steroids well, but on a few occasions has noticed blood sugar readings in the 400s to 500s  She has not contacted her endocrinologist with this information, and they are not aware that she has been started on steroids    She overall denies any joint pains, swelling or stiffness, but states on occasion with changes in the weather and the cooler temperatures she may feel some achiness      She continues to experience the headaches, and is scheduled for a neurology evaluation in February 2020  She was recently seen by Hematology and her lab abnormalities were all thought to be related to a reactive process  No other complaints noted at this time        1/8/2020:  Patient presents for a follow-up of polymyalgia rheumatica  She is currently on oral prednisone 15 mg once daily  I reviewed her labs done yesterday which showed an ESR of 59 which had improved, but her C reactive protein continues to be elevated at 38 5  At the last office visit we had decreased her prednisone dose from 20-15 mg once daily  She mentions that there has been no significant change in her symptoms with doing so and she is tolerating the steroid taper well  Dependent on the weather she may have some achiness around her shoulder region, but otherwise denies any significant flare-ups of joint pain, swelling or stiffness  She is tolerating the prednisone well as well  She continues to check her blood sugars 3-4 times daily, and is in touch with her endocrinologist regarding the hyperglycemia  They did increase her insulin regimen  She will be following up with her ophthalmologist at the end of January  She continues to experience the eye redness and blurred vision  She has an appointment with Neurology next month as she continues to experience the headaches  The following portions of the patient's history were reviewed and updated as appropriate: allergies, current medications, past family history, past medical history, past social history, past surgical history and problem list       Review of Systems  Constitutional: Negative for fevers, chills, night sweats  Positive for fatigue and weight gain    ENT/Mouth: Negative for hearing changes, ear pain, nasal congestion, sinus pain, hoarseness, sore throat, rhinorrhea, swallowing difficulty  Eyes: Negative for discharge  Positive for pain, redness, vision changes and dryness  Cardiovascular: Negative for chest pain, SOB, palpitations  Respiratory: Negative for cough, sputum, wheezing, dyspnea  Gastrointestinal: Negative for nausea, vomiting, diarrhea, pain, heartburn  Positive for constipation  Genitourinary: Negative for dysuria, urinary frequency, hematuria  Musculoskeletal: As per HPI  Skin: Negative for skin rash, color changes  Neuro: Negative for tingling, loss of consciousness  Positive for headaches, lightheadedness, numbness and weakness  Psych: Negative for depression  Positive for anxiety  Heme/Lymph: Negative for easy bruising, bleeding, lymphadenopathy          Past Medical History:   Diagnosis Date    Anemia     Arthritis     Dyspnea on exertion     Hyperlipidemia     Hypertension     Legally blind 2010    Mild intermittent asthma with exacerbation 8/4/2018    Miscarriage     x 3    Seizures (HCC)     Sickle cell trait ([facility] [address] )     Sleep apnea        Past Surgical History:   Procedure Laterality Date    CATARACT EXTRACTION Bilateral     august and september    EYE SURGERY      HYSTERECTOMY      37    OOPHORECTOMY Left     37    MD TEMPORAL ARTERY LIGATN OR BX Bilateral 10/17/2019    Procedure: BIOPSY ARTERY TEMPORAL;  Surgeon: Radha Parsons DO;  Location: [facility];  Service: Vascular       Social History     Socioeconomic History    Marital status: /Civil Union     Spouse name: Not on file    Number of children: Not on file    Years of education: Not on file    Highest education level: Not on file   Occupational History    Occupation: employed   Social Needs    Financial resource strain: Not on file    Food insecurity:     Worry: Not on file     Inability: Not on file   Dwllr needs:     Medical: Not on file     Non-medical: Not on file   Tobacco Use    Smoking status: Never Smoker    Smokeless tobacco: Never Used   Substance and Sexual Activity    Alcohol use: Never     Alcohol/week: 0 0 standard drinks     Frequency: Never     Drinks per session: Patient refused     Binge frequency: Never    Drug use: No    Sexual activity: Not Currently     Partners: Male     Birth control/protection: None   Lifestyle    Physical activity:     Days per week: Not on file     Minutes per session: Not on file    Stress: Not on file   Relationships    Social connections:     Talks on phone: Not on file     Gets together: Not on file     Attends Zoroastrian service: Not on file     Active member of club or organization: Not on file     Attends meetings of clubs or organizations: Not on file     Relationship status: Not on file    Intimate partner violence:     Fear of current or ex partner: Not on file     Emotionally abused: Not on file     Physically abused: Not on file     Forced sexual activity: Not on file   Other Topics Concern    Not on file   Social History Narrative    Caffeine use       Family History   Problem Relation Age of Onset    Heart attack Mother     Heart disease Mother     Hypertension Mother     No Known Problems Father     Heart disease Sister     No Known Problems Brother     No Known Problems Son     No Known Problems Daughter     Heart attack Maternal Grandmother     Heart disease Maternal Grandmother     Diabetes Other     Heart attack Other     No Known Problems Sister     No Known Problems Sister     No Known Problems Paternal Aunt     No Known Problems Son     Cancer Neg Hx     Stroke Neg Hx        Allergies   Allergen Reactions    Aspirin      Pt cannot specify if she has an ASA allergy; pt is on ASA 81 mg daily       Current Outpatient Medications:     amLODIPine (NORVASC) 10 mg tablet, Take by mouth, Disp: , Rfl:     aspirin (ADULT ASPIRIN EC LOW STRENGTH) 81 mg EC tablet, Take by mouth, Disp: , Rfl:    hydrochlorothiazide (HYDRODIURIL) 25 mg tablet, Take 25 mg by mouth, Disp: , Rfl:     labetalol (NORMODYNE) 200 mg tablet, Take by mouth, Disp: , Rfl:     lisinopril (ZESTRIL) 20 mg tablet, Take 40 mg by mouth, Disp: , Rfl:     metFORMIN (GLUCOPHAGE) 500 mg tablet, Take by mouth, Disp: , Rfl:     metoprolol tartrate (LOPRESSOR) 25 mg tablet, Take 12 5 mg by mouth, Disp: , Rfl:     valsartan-hydrochlorothiazide (DIOVAN-HCT) 320-12 5 MG per tablet, Take by mouth, Disp: , Rfl:     albuterol (PROVENTIL HFA) 90 mcg/act inhaler, Inhale 2 puffs every 6 (six) hours as needed for wheezing For another 24 hours use every 4 hours standing, Disp: 6 7 g, Rfl: 0    aspirin 81 MG tablet, Take 1 tablet by mouth daily, Disp: , Rfl:     atorvastatin (LIPITOR) 40 mg tablet, TAKE 1 TABLET BY MOUTH EVERY DAY, Disp: 30 tablet, Rfl: 0    BASAGLAR KWIKPEN 100 units/mL injection pen, Inject 40 Units under the skin every 12 (twelve) hours, Disp: 5 pen, Rfl: 1    SARAH MICROLET LANCETS lancets, Inject 1 applicator into the skin 4 (four) times a day, Disp: , Rfl:     Blood Glucose Monitoring Suppl (CONTOUR NEXT MONITOR) w/Device KIT, Disp 1 meter dx E11 9, may submitted any contour next meter  If not covered let us know we can change strips, Disp: 1 kit, Rfl: 1    Blood Pressure Monitor KIT, Check blood pressures BID, Disp: 1 each, Rfl: 0    budesonide-formoterol (SYMBICORT) 80-4 5 MCG/ACT inhaler, Inhale 2 puffs 2 (two) times a day Rinse mouth after use   (Patient taking differently: Inhale 2 puffs as needed Rinse mouth after use ), Disp: 1 Inhaler, Rfl: 5    Cholecalciferol (VITAMIN D3) 3000 units TABS, Take by mouth daily , Disp: , Rfl:     CONTOUR NEXT TEST test strip, Test blood sugar 3x per day dx E11 9, Disp: 300 each, Rfl: 1    diltiazem (TIAZAC) 420 MG 24 hr capsule, Take 1 capsule (420 mg total) by mouth daily, Disp: 30 capsule, Rfl: 5    ferrous sulfate 324 (65 Fe) mg, Take 1 tablet (324 mg total) by mouth every other day, Disp: 45 tablet, Rfl: 3    gabapentin (NEURONTIN) 100 mg capsule, Take 1 capsule in am and 2-3 capsules at night, Disp: 90 capsule, Rfl: 0    HUMALOG KWIKPEN 100 units/mL injection pen, Take 25 units before meals, Disp: 10 pen, Rfl: 3    Insulin Pen Needle (BD PEN NEEDLE DERRICK U/F) 32G X 4 MM MISC, Inject 1 applicator under the skin 4 (four) times a day, Disp: , Rfl:     losartan (COZAAR) 100 MG tablet, TAKE 1 TABLET BY MOUTH EVERY DAY, Disp: 30 tablet, Rfl: 5    predniSONE 2 5 mg tablet, Take 12 5 mg daily x 2 weeks  Combine with 5 mg pill , Disp: 14 tablet, Rfl: 0    predniSONE 5 mg tablet, Take 3 tablets (15 mg total) by mouth daily, Disp: 90 tablet, Rfl: 2    simvastatin (ZOCOR) 40 mg tablet, Take by mouth, Disp: , Rfl:       Objective:    Vitals:    01/08/20 1336   BP: 142/72   Pulse: 90   Weight: (!) 138 kg (305 lb)   Height: 5' 8" (1 727 m)       Physical Exam  General: Well appearing, well nourished, in no distress  Oriented x 3, normal mood and affect  Ambulating without difficulty  Obese  Skin: Good turgor, no rash, unusual bruising or prominent lesions  Hair: Normal texture and distribution  Nails: Normal color, no deformities  HEENT:  Head: Normocephalic, atraumatic  Eyes: Conjunctival erythema noted  Nose: No external lesions, mucosa non-inflamed  Mouth: Mucous membranes moist, no mucosal lesions  Neck: Supple, thyroid non-enlarged and non-tender  No lymphadenopathy  Extremities: No amputations or deformities, cyanosis, edema  Musculoskeletal:  There is no peripheral joint soft tissue swelling visualized  Neurologic: Alert and oriented  No focal neurological deficits appreciated  Psychiatric: Normal mood and affect  DOC Pereira    Rheumatology

## 2020-01-13 ENCOUNTER — PATIENT OUTREACH (OUTPATIENT)
Dept: FAMILY MEDICINE CLINIC | Facility: CLINIC | Age: 60
End: 2020-01-13

## 2020-01-13 NOTE — PROGRESS NOTES
Patient referred to me by Jun Winslow   I called patient, introduced myself and explained my role  At this point she does not need my services as she states she sees endocrinology who is keeping a close watch on her diabetes  Patient also explained to me she is is on chronic steroids and will be for quite some time  I gave her my contact information in case she decides she needs further assistance in the future  Chart reviewed

## 2020-01-17 ENCOUNTER — HOSPITAL ENCOUNTER (OUTPATIENT)
Dept: BONE DENSITY | Facility: CLINIC | Age: 60
Discharge: HOME | End: 2020-01-17
Payer: COMMERCIAL

## 2020-01-17 DIAGNOSIS — Z79.52 CURRENT USE OF STEROID MEDICATION: ICD-10-CM

## 2020-01-17 DIAGNOSIS — M35.3 PMR (POLYMYALGIA RHEUMATICA) (HCC): ICD-10-CM

## 2020-01-17 DIAGNOSIS — IMO0001 IDDM (INSULIN DEPENDENT DIABETES MELLITUS): ICD-10-CM

## 2020-01-17 PROCEDURE — 77080 DXA BONE DENSITY AXIAL: CPT

## 2020-01-20 ENCOUNTER — PATIENT OUTREACH (OUTPATIENT)
Dept: FAMILY MEDICINE CLINIC | Facility: CLINIC | Age: 60
End: 2020-01-20

## 2020-01-20 NOTE — PROGRESS NOTES
1st Attempt - [facility] lm for Yao Voss to discuss referral submitted by Maria Teresa Simms for assistance with [employer] application

## 2020-01-23 ENCOUNTER — OFFICE VISIT (OUTPATIENT)
Dept: ENDOCRINOLOGY | Facility: CLINIC | Age: 60
End: 2020-01-23
Payer: COMMERCIAL

## 2020-01-23 VITALS
DIASTOLIC BLOOD PRESSURE: 70 MMHG | HEIGHT: 68 IN | HEART RATE: 90 BPM | WEIGHT: 293 LBS | SYSTOLIC BLOOD PRESSURE: 120 MMHG | BODY MASS INDEX: 44.41 KG/M2

## 2020-01-23 DIAGNOSIS — IMO0002 UNCONTROLLED TYPE 2 DIABETES MELLITUS WITH OPHTHALMIC COMPLICATION, WITH LONG-TERM CURRENT USE OF INSULIN: ICD-10-CM

## 2020-01-23 DIAGNOSIS — E78.5 HYPERLIPIDEMIA, UNSPECIFIED HYPERLIPIDEMIA TYPE: ICD-10-CM

## 2020-01-23 DIAGNOSIS — E11.65 TYPE 2 DIABETES MELLITUS WITH HYPERGLYCEMIA, UNSPECIFIED WHETHER LONG TERM INSULIN USE (HCC): Primary | ICD-10-CM

## 2020-01-23 DIAGNOSIS — I10 ESSENTIAL HYPERTENSION: ICD-10-CM

## 2020-01-23 DIAGNOSIS — E55.9 VITAMIN D DEFICIENCY: ICD-10-CM

## 2020-01-23 PROCEDURE — 3078F DIAST BP <80 MM HG: CPT | Performed by: PHYSICIAN ASSISTANT

## 2020-01-23 PROCEDURE — 3074F SYST BP LT 130 MM HG: CPT | Performed by: PHYSICIAN ASSISTANT

## 2020-01-23 PROCEDURE — 99215 OFFICE O/P EST HI 40 MIN: CPT | Performed by: PHYSICIAN ASSISTANT

## 2020-01-23 RX ORDER — INSULIN GLARGINE 100 [IU]/ML
INJECTION, SOLUTION SUBCUTANEOUS
Qty: 5 PEN | Refills: 1
Start: 2020-01-23 | End: 2020-02-21 | Stop reason: SDUPTHER

## 2020-01-23 NOTE — ASSESSMENT & PLAN NOTE
Poorly Controlled/Not at goal with A1C of 9 3  Glucose log reviewed with patient at visit- blood sugars are mostly high overall with a few morning readings in target  She does admit to sometimes forgetting her insulin and she sometimes remember whether or not she took it  She was given information for timesulin insulin pen cap to help remember when her last dose of insulin was  Increase Basaglar to 45 units in the morning  Take the evening dose of 45 units at dinner instead of bedtime to avoid falling asleep and missing dose  Check BG 3-4x per day and send log for review in two weeks for additional adjustment if needed  If she develops hypoglycemia when steroid are reduced she should let us know     Lab Results   Component Value Date    HGBA1C 9 3 (H) 12/06/2019

## 2020-01-23 NOTE — PROGRESS NOTES
Established Patient Progress Note      Chief Complaint   Patient presents with    Diabetes Type 1        History of Present Illness:   Jazmyn Andrews is a 62 y o  female with a history of type 2 diabetes with long term use of insulin   Reports complications of retinopathy, neuropathy, and nephropathy  Denies recent illness or hospitalizations  Denies recent severe hypoglycemic or severe hyperglycemic episodes  Denies any issues with her current regimen  home glucose monitoring: are performed regularly on avearge 2-4x per day  Currently taking Prednisone 10mg daily--- reduced starting today  This is being reduced  Was up to 20mg daily  Reports fatigue, sleeping a lot  Readings mostly 200-300, a couple morning readings 100s  Sometimes forgets to take insulin- especially  if cant' remember if takes  Sometimes falls asleep and forgets evening dose of basaglar       Current regimen:   Basaglar 40 units twice per day   Humalog 25 before each meal, sometimes more per sliding scale    Last Eye Exam: UTD  Last Foot Exam: UTD    Has hypertension: Taking multiple meds  Has hyperlipidemia: Taking simavstatin      Vit D Deficiency- takes supplements       Patient Active Problem List   Diagnosis    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, with long-term current use of insulin ([address] )    Essential hypertension    Seizures (HCC)    Exertional dyspnea    Enlarged pulmonary artery (HCC)    Aortic dilatation (HCC)    Anemia    Decreased pulses in feet    Diabetes mellitus type 2 with complications, uncontrolled ([address] )    Hyperlipidemia    Microalbuminuria    Mild obstructive sleep apnea    Class 3 severe obesity with serious comorbidity and body mass index (BMI) of 45 0 to 49 9 in adult [facility])    Peripheral neuropathy    Prolonged QT interval    Visual impairment in both eyes    Vitamin D deficiency    Lung nodules    Orthostatic hypotension    Mild intermittent asthma with exacerbation    Type 2 diabetes mellitus with microalbuminuria, with long-term current use of insulin ([facility])    Frequent headaches    Other inflammatory and immune myopathies, not elsewhere classified    Transaminitis    Morbid obesity ([facility] )    Polyneuropathy associated with underlying disease ([facility] )    PMR (polymyalgia rheumatica) ([facility]    Thrombocytosis ([facility] )      Past Medical History:   Diagnosis Date    Anemia     Arthritis     Dyspnea on exertion     Hyperlipidemia     Hypertension     Legally blind 2010    Mild intermittent asthma with exacerbation 8/4/2018    Miscarriage     x 3    Seizures ([facility])     Sickle cell trait ([facility] )     Sleep apnea       Past Surgical History:   Procedure Laterality Date    CATARACT EXTRACTION Bilateral     august and september    EYE SURGERY      HYSTERECTOMY      37    OOPHORECTOMY Left     37    OH TEMPORAL ARTERY LIGATN OR BX Bilateral 10/17/2019    Procedure: BIOPSY ARTERY TEMPORAL;  Surgeon: Jolene Johnson DO;  [facility]: [facility];  Service: Vascular      Family History   Problem Relation Age of Onset    Heart attack Mother     Heart disease Mother     Hypertension Mother     No Known Problems Father     Heart disease Sister     No Known Problems Brother     No Known Problems Son     No Known Problems Daughter     Heart attack Maternal Grandmother     Heart disease Maternal Grandmother     Diabetes Other     Heart attack Other     No Known Problems Sister     No Known Problems Sister     No Known Problems Paternal Aunt     No Known Problems Son     Cancer Neg Hx     Stroke Neg Hx      Social History     Tobacco Use    Smoking status: Never Smoker    Smokeless tobacco: Never Used   Substance Use Topics    Alcohol use: Never     Alcohol/week: 0 0 standard drinks     Frequency: Never     Drinks per session: Patient refused     Binge frequency: Never     No Known Allergies      Current Outpatient Medications:     albuterol (PROVENTIL HFA) 90 mcg/act inhaler, Inhale 2 puffs every 6 (six) hours as needed for wheezing For another 24 hours use every 4 hours standing, Disp: 6 7 g, Rfl: 0    amLODIPine (NORVASC) 10 mg tablet, Take 10 mg by mouth daily , Disp: , Rfl:     aspirin (ADULT ASPIRIN EC LOW STRENGTH) 81 mg EC tablet, Take 81 mg by mouth daily , Disp: , Rfl:     atorvastatin (LIPITOR) 40 mg tablet, TAKE 1 TABLET BY MOUTH EVERY DAY, Disp: 30 tablet, Rfl: 0    BASAGLAR KWIKPEN 100 units/mL injection pen, 45 units in morning, 40 at night, Disp: 5 pen, Rfl: 1    SARAH MICROLET LANCETS lancets, Inject 1 applicator into the skin 4 (four) times a day, Disp: , Rfl:     Blood Glucose Monitoring Suppl (CONTOUR NEXT MONITOR) w/Device KIT, Disp 1 meter dx E11 9, may submitted any contour next meter  If not covered let us know we can change strips, Disp: 1 kit, Rfl: 1    budesonide-formoterol (SYMBICORT) 80-4 5 MCG/ACT inhaler, Inhale 2 puffs 2 (two) times a day Rinse mouth after use   (Patient taking differently: Inhale 2 puffs as needed Rinse mouth after use ), Disp: 1 Inhaler, Rfl: 5    Cholecalciferol (VITAMIN D3) 3000 units TABS, Take 3,000 Units by mouth daily , Disp: , Rfl:     CONTOUR NEXT TEST test strip, Test blood sugar 3x per day dx E11 9, Disp: 300 each, Rfl: 1    diltiazem (TIAZAC) 420 MG 24 hr capsule, Take 1 capsule (420 mg total) by mouth daily, Disp: 30 capsule, Rfl: 5    ferrous sulfate 324 (65 Fe) mg, Take 1 tablet (324 mg total) by mouth every other day, Disp: 45 tablet, Rfl: 3    gabapentin (NEURONTIN) 100 mg capsule, Take 1 capsule in am and 2-3 capsules at night, Disp: 90 capsule, Rfl: 0    HUMALOG KWIKPEN 100 units/mL injection pen, Take 25 units before meals, Disp: 10 pen, Rfl: 3    hydrochlorothiazide (HYDRODIURIL) 25 mg tablet, Take 25 mg by mouth daily , Disp: , Rfl:     Insulin Pen Needle (BD PEN NEEDLE DERRICK U/F) 32G X 4 MM MISC, Inject 1 applicator under the skin 4 (four) times a day, Disp: , Rfl:     labetalol (NORMODYNE) 200 mg tablet, Take 200 mg by mouth daily , Disp: , Rfl:     lisinopril (ZESTRIL) 20 mg tablet, Take 40 mg by mouth, Disp: , Rfl:     losartan (COZAAR) 100 MG tablet, TAKE 1 TABLET BY MOUTH EVERY DAY, Disp: 30 tablet, Rfl: 5    predniSONE 5 mg tablet, Take 3 tablets (15 mg total) by mouth daily (Patient taking differently: Take 5 mg by mouth 2 (two) times a day with meals ), Disp: 90 tablet, Rfl: 2    simvastatin (ZOCOR) 40 mg tablet, Take 40 mg by mouth daily at bedtime , Disp: , Rfl:     valsartan-hydrochlorothiazide (DIOVAN-HCT) 320-12 5 MG per tablet, Take 1 tablet by mouth daily , Disp: , Rfl:     Blood Pressure Monitor KIT, Check blood pressures BID (Patient not taking: Reported on 1/23/2020), Disp: 1 each, Rfl: 0    metoprolol tartrate (LOPRESSOR) 25 mg tablet, Take 12 5 mg by mouth, Disp: , Rfl:     Review of Systems   Constitutional: Positive for fatigue  Negative for activity change, appetite change, chills, diaphoresis, fever and unexpected weight change  HENT: Negative for trouble swallowing and voice change  Eyes: Positive for visual disturbance  Respiratory: Negative for shortness of breath  Cardiovascular: Negative for chest pain and palpitations  Gastrointestinal: Negative for abdominal pain, constipation and diarrhea  Endocrine: Negative for cold intolerance, heat intolerance, polydipsia, polyphagia and polyuria  Genitourinary: Negative for frequency and menstrual problem  Musculoskeletal: Positive for arthralgias, gait problem and myalgias  Skin: Negative for rash  Allergic/Immunologic: Negative for food allergies  Neurological: Positive for weakness and headaches (seeing neurology soon)  Negative for dizziness and tremors  Hematological: Negative for adenopathy  Psychiatric/Behavioral: Negative for sleep disturbance  All other systems reviewed and are negative  Physical Exam:  Body mass index is 46 53 kg/m²    /70 (BP Location: Left arm, Patient Position: Sitting, Cuff Size: Large)   Pulse 90   Ht 5' 8" (1 727 m)   Wt (!) 139 kg (306 lb)   BMI 46 53 kg/m²    Wt Readings from Last 3 Encounters:   01/23/20 (!) 139 kg (306 lb)   01/08/20 (!) 138 kg (305 lb)   01/07/20 (!) 138 kg (305 lb)       Physical Exam   Constitutional: She is oriented to person, place, and time  She appears well-developed and well-nourished  No distress  HENT:   Head: Normocephalic and atraumatic  Eyes: Pupils are equal, round, and reactive to light  Conjunctivae are normal    Neck: Normal range of motion  Neck supple  No thyromegaly present  Cardiovascular: Normal rate, regular rhythm and normal heart sounds  Pulmonary/Chest: Effort normal and breath sounds normal  No respiratory distress  She has no wheezes  She has no rales  Abdominal: Soft  Bowel sounds are normal  She exhibits no distension  There is no tenderness  Musculoskeletal: Normal range of motion  She exhibits no edema  Neurological: She is alert and oriented to person, place, and time  Skin: Skin is warm and dry  Psychiatric: She has a normal mood and affect  Vitals reviewed        Labs:   Lab Results   Component Value Date    HGBA1C 9 3 (H) 12/06/2019    HGBA1C 14 6 (H) 08/22/2019    HGBA1C 9 9 (A) 04/03/2019     Lab Results   Component Value Date    CREATININE 0 79 01/07/2020    CREATININE 1 04 12/06/2019    CREATININE 1 07 10/09/2019    BUN 14 01/07/2020     01/04/2016    K 3 6 01/07/2020     01/07/2020    CO2 32 01/07/2020     eGFR   Date Value Ref Range Status   01/07/2020 93 ml/min/1 73sq m Final     Lab Results   Component Value Date    CHOL 130 10/08/2015    HDL 37 (L) 08/24/2019    TRIG 124 08/24/2019     Lab Results   Component Value Date    ALT 19 01/07/2020    AST <5 (L) 01/07/2020    ALKPHOS 127 (H) 01/07/2020    BILITOT 0 69 01/04/2016     Lab Results   Component Value Date    OSZ3GKKYESKW 0 736 12/06/2019    EVS1YJBYQJWN 1 710 09/16/2019    MDB7BENGRLYB 1 680 01/03/2019     Lab Results   Component Value Date    FREET4 1 04 01/03/2019       Impression & Plan:    Problem List Items Addressed This Visit        Endocrine    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, with long-term current use of insulin ([address] )     Poorly Controlled/Not at goal with A1C of 9 3  Glucose log reviewed with patient at visit- blood sugars are mostly high overall with a few morning readings in target  She does admit to sometimes forgetting her insulin and she sometimes remember whether or not she took it  She was given information for timesulin insulin pen cap to help remember when her last dose of insulin was  Increase Basaglar to 45 units in the morning  Take the evening dose of 45 units at dinner instead of bedtime to avoid falling asleep and missing dose  Check BG 3-4x per day and send log for review in two weeks for additional adjustment if needed  If she develops hypoglycemia when steroid are reduced she should let us know  Lab Results   Component Value Date    HGBA1C 9 3 (H) 12/06/2019            Relevant Medications    BASAGLAR KWIKPEN 100 units/mL injection pen       Cardiovascular and Mediastinum    Essential hypertension     Now under very good control  Other    Hyperlipidemia     Continue simvastatin  Vitamin D deficiency     Continue supplements  Other Visit Diagnoses     Type 2 diabetes mellitus with hyperglycemia, unspecified whether long term insulin use (HCC)    -  Primary    Relevant Medications    BASAGLAR KWIKPEN 100 units/mL injection pen    Other Relevant Orders    Hemoglobin A1C          Orders Placed This Encounter   Procedures    Hemoglobin A1C     Standing Status:   Future     Standing Expiration Date:   1/23/2021       There are no Patient Instructions on file for this visit  Discussed with the patient and all questioned fully answered  She will call me if any problems arise      Follow-up appointment in 6 weeks      Counseled patient on diagnostic results, prognosis, risk and benefit of treatment options, instruction for management, importance of treatment compliance, Risk  factor reduction and impressions    Shruthi Colby PA-C

## 2020-01-24 NOTE — PROGRESS NOTES
3rd Attempt - CHW spoke w/Edie to discuss referral made by ARJUN Leone for assistance with [employer] application  Home visit scheduled for 1/29

## 2020-01-29 ENCOUNTER — TELEPHONE (OUTPATIENT)
Dept: OBGYN CLINIC | Facility: CLINIC | Age: 60
End: 2020-01-29

## 2020-01-29 NOTE — TELEPHONE ENCOUNTER
Dr Alf Vasques called because her office notes from her last 1/8/20 have not been sent to [facility]  She did not have fax # but says you do    Please call her once sent [phone]  Thank you

## 2020-01-30 ENCOUNTER — PATIENT OUTREACH (OUTPATIENT)
Dept: FAMILY MEDICINE CLINIC | Facility: CLINIC | Age: 60
End: 2020-01-30

## 2020-02-03 LAB
LEFT EYE DIABETIC RETINOPATHY: NORMAL
RIGHT EYE DIABETIC RETINOPATHY: NORMAL

## 2020-02-03 PROCEDURE — 2022F DILAT RTA XM EVC RTNOPTHY: CPT | Performed by: PHYSICIAN ASSISTANT

## 2020-02-03 NOTE — PROGRESS NOTES
CHW made first home visit  Luis Gruber is a nice friendly individual   Luis Gruber resides in an apartment complex on the 3rd floor with her  and one son  There are about six steps to get to the outside entrance to the apartment building and about 16 steps to Edie's apartment  Laundry facilities are located in the basement of the building  Luis Gruber indicated to CHW it's getting more and more difficult for her to take all the steps due to her vision loss and plans to start looking for another residences within the next year  CHW indicated to Luis Gruber that I would be happy to assist and Luis Gruber 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  Luis Gruber went on to tell CHW she had to switch jobs since driving became an issue with her vision loss and she currently is on medical leave from [employer] but thinks she might be taking SSD  CHW completed in home assessment and determined no other resources are needed at this time  CHW completed [employer] application and explained to Luis Gruber that she would need to complete an in-person assessment with [facility] for services due to be under 70  Luis Gruber was in agreement  Luis Gruber currently takes [employer] but due to her vision continuing to deteriorate it's difficult  CHW informed Luis Gruber I would follow up with [employer] application and contact her in a few days  Luis Gruber was in agreement  Provided other supportive counseling

## 2020-02-05 ENCOUNTER — PATIENT OUTREACH (OUTPATIENT)
Dept: FAMILY MEDICINE CLINIC | Facility: CLINIC | Age: 60
End: 2020-02-05

## 2020-02-05 NOTE — PROGRESS NOTES
CHW spoke w Jazz Giang for an update regarding Edie's application  CHW was informed that application will be submitted over to [facility]) for in-person assessment and Toma Orozco should be receiving a call within the next few days from [facility]) to coordinate assessment  CHW will contact Toma Orozco with an update

## 2020-02-05 NOTE — PROGRESS NOTES
CHW spoke w/Edie and informed her [address] application would be submitted to [facility] for an in-person assessment and she should hear from someone from [facility] within the next few days to coordinate date and time  CHW informed Yumiko Weathers to let [facility] know that she would be needing transportation for assessment  Yumiko Weathers agreed  CHW informed Yumiko Weathers to contact CHW once a date has been established for assessment and CHW would follow up with [place] 2-3 days after assessment for a decision on services  Yumiko Weathers said she would contact CHW with information  CHW asked how Yumiko Weathers was doing otherwise  Yumiko Weathers indicated she was doing ok and hoping to get out of the house today to  some medication

## 2020-02-06 NOTE — PROGRESS NOTES
CHW received call from Sarah Beth Rosales indicating she received packet from [place] [id number] regarding her incomplete application  CHW informed Sarah Beth Rosales I corrected the information and resubmitted application back to [place] [id number]  Next step is for Alissa to coordinate an in-person assessment  CHW went on to tell Sarah Beth Rosales to contact CHW once she hears from [place] re: an appt  Sarah Beth Rosales agreed  CHW will contact Sarah Beth Rosales on 2/12 if no contact prior to that date

## 2020-02-10 ENCOUNTER — PATIENT OUTREACH (OUTPATIENT)
Dept: FAMILY MEDICINE CLINIC | Facility: CLINIC | Age: 60
End: 2020-02-10

## 2020-02-10 ENCOUNTER — APPOINTMENT (EMERGENCY)
Dept: RADIOLOGY | Facility: HOSPITAL | Age: 60
End: 2020-02-10
Payer: COMMERCIAL

## 2020-02-10 ENCOUNTER — OFFICE VISIT (OUTPATIENT)
Dept: NEUROLOGY | Facility: CLINIC | Age: 60
End: 2020-02-10
Payer: COMMERCIAL

## 2020-02-10 ENCOUNTER — HOSPITAL ENCOUNTER (OUTPATIENT)
Facility: HOSPITAL | Age: 60
Setting detail: OBSERVATION
Discharge: HOME | End: 2020-02-12
Attending: EMERGENCY MEDICINE | Admitting: FAMILY MEDICINE
Payer: COMMERCIAL

## 2020-02-10 VITALS
WEIGHT: 293 LBS | HEART RATE: 99 BPM | BODY MASS INDEX: 44.41 KG/M2 | HEIGHT: 68 IN | DIASTOLIC BLOOD PRESSURE: 101 MMHG | SYSTOLIC BLOOD PRESSURE: 193 MMHG

## 2020-02-10 DIAGNOSIS — I10 MALIGNANT HYPERTENSION: ICD-10-CM

## 2020-02-10 DIAGNOSIS — I67.1 CEREBRAL ANEURYSM WITHOUT RUPTURE: ICD-10-CM

## 2020-02-10 DIAGNOSIS — I10 ESSENTIAL HYPERTENSION: ICD-10-CM

## 2020-02-10 DIAGNOSIS — R51.9 NEW ONSET OF HEADACHES AFTER AGE 50: Primary | ICD-10-CM

## 2020-02-10 DIAGNOSIS — I10 UNCONTROLLED HYPERTENSION: Primary | ICD-10-CM

## 2020-02-10 DIAGNOSIS — R51.9 CHRONIC NONINTRACTABLE HEADACHE, UNSPECIFIED HEADACHE TYPE: ICD-10-CM

## 2020-02-10 DIAGNOSIS — H11.433 CONJUNCTIVAL INJECTION, BILATERAL: ICD-10-CM

## 2020-02-10 DIAGNOSIS — G89.29 CHRONIC NONINTRACTABLE HEADACHE, UNSPECIFIED HEADACHE TYPE: ICD-10-CM

## 2020-02-10 DIAGNOSIS — I72.9 ANEURYSM (HCC): ICD-10-CM

## 2020-02-10 LAB
ALBUMIN SERPL BCP-MCNC: 3 G/DL (ref 3.5–5)
ALP SERPL-CCNC: 152 U/L (ref 46–116)
ALT SERPL W P-5'-P-CCNC: 19 U/L (ref 12–78)
ANION GAP SERPL CALCULATED.3IONS-SCNC: 7 MMOL/L (ref 4–13)
AST SERPL W P-5'-P-CCNC: 12 U/L (ref 5–45)
ATRIAL RATE: 81 BPM
BASOPHILS # BLD AUTO: 0.05 THOUSANDS/ΜL (ref 0–0.1)
BASOPHILS NFR BLD AUTO: 1 % (ref 0–1)
BILIRUB SERPL-MCNC: 0.28 MG/DL (ref 0.2–1)
BUN SERPL-MCNC: 14 MG/DL (ref 5–25)
CALCIUM SERPL-MCNC: 9.5 MG/DL (ref 8.3–10.1)
CHLORIDE SERPL-SCNC: 102 MMOL/L (ref 100–108)
CO2 SERPL-SCNC: 30 MMOL/L (ref 21–32)
CREAT SERPL-MCNC: 0.91 MG/DL (ref 0.6–1.3)
EOSINOPHIL # BLD AUTO: 0.16 THOUSAND/ΜL (ref 0–0.61)
EOSINOPHIL NFR BLD AUTO: 2 % (ref 0–6)
ERYTHROCYTE [DISTWIDTH] IN BLOOD BY AUTOMATED COUNT: 18.2 % (ref 11.6–15.1)
GFR SERPL CREATININE-BSD FRML MDRD: 79 ML/MIN/1.73SQ M
GLUCOSE SERPL-MCNC: 261 MG/DL (ref 65–140)
GLUCOSE SERPL-MCNC: 379 MG/DL (ref 65–140)
GLUCOSE SERPL-MCNC: 472 MG/DL (ref 65–140)
HCT VFR BLD AUTO: 37.6 % (ref 34.8–46.1)
HGB BLD-MCNC: 11.2 G/DL (ref 11.5–15.4)
IMM GRANULOCYTES # BLD AUTO: 0.07 THOUSAND/UL (ref 0–0.2)
IMM GRANULOCYTES NFR BLD AUTO: 1 % (ref 0–2)
LYMPHOCYTES # BLD AUTO: 2.43 THOUSANDS/ΜL (ref 0.6–4.47)
LYMPHOCYTES NFR BLD AUTO: 22 % (ref 14–44)
MCH RBC QN AUTO: 22.7 PG (ref 26.8–34.3)
MCHC RBC AUTO-ENTMCNC: 29.8 G/DL (ref 31.4–37.4)
MCV RBC AUTO: 76 FL (ref 82–98)
MONOCYTES # BLD AUTO: 0.48 THOUSAND/ΜL (ref 0.17–1.22)
MONOCYTES NFR BLD AUTO: 4 % (ref 4–12)
NEUTROPHILS # BLD AUTO: 7.74 THOUSANDS/ΜL (ref 1.85–7.62)
NEUTS SEG NFR BLD AUTO: 70 % (ref 43–75)
NRBC BLD AUTO-RTO: 0 /100 WBCS
P AXIS: 61 DEGREES
PLATELET # BLD AUTO: 378 THOUSANDS/UL (ref 149–390)
PMV BLD AUTO: 10.5 FL (ref 8.9–12.7)
POTASSIUM SERPL-SCNC: 3.5 MMOL/L (ref 3.5–5.3)
PR INTERVAL: 168 MS
PROT SERPL-MCNC: 7.8 G/DL (ref 6.4–8.2)
QRS AXIS: -2 DEGREES
QRSD INTERVAL: 96 MS
QT INTERVAL: 396 MS
QTC INTERVAL: 460 MS
RBC # BLD AUTO: 4.94 MILLION/UL (ref 3.81–5.12)
SODIUM SERPL-SCNC: 139 MMOL/L (ref 136–145)
T WAVE AXIS: 44 DEGREES
TROPONIN I SERPL-MCNC: 0.02 NG/ML
TROPONIN I SERPL-MCNC: 0.02 NG/ML
TROPONIN I SERPL-MCNC: <0.02 NG/ML
VENTRICULAR RATE: 81 BPM
WBC # BLD AUTO: 10.93 THOUSAND/UL (ref 4.31–10.16)

## 2020-02-10 PROCEDURE — 93010 ELECTROCARDIOGRAM REPORT: CPT | Performed by: INTERNAL MEDICINE

## 2020-02-10 PROCEDURE — 70498 CT ANGIOGRAPHY NECK: CPT

## 2020-02-10 PROCEDURE — NC001 PR NO CHARGE: Performed by: FAMILY MEDICINE

## 2020-02-10 PROCEDURE — 96365 THER/PROPH/DIAG IV INF INIT: CPT

## 2020-02-10 PROCEDURE — 99285 EMERGENCY DEPT VISIT HI MDM: CPT

## 2020-02-10 PROCEDURE — 3080F DIAST BP >= 90 MM HG: CPT | Performed by: PSYCHIATRY & NEUROLOGY

## 2020-02-10 PROCEDURE — 96361 HYDRATE IV INFUSION ADD-ON: CPT

## 2020-02-10 PROCEDURE — 2026F EYE IMG VALID EVC RTNOPTHY: CPT | Performed by: PSYCHIATRY & NEUROLOGY

## 2020-02-10 PROCEDURE — 3008F BODY MASS INDEX DOCD: CPT | Performed by: PSYCHIATRY & NEUROLOGY

## 2020-02-10 PROCEDURE — 85025 COMPLETE CBC W/AUTO DIFF WBC: CPT | Performed by: EMERGENCY MEDICINE

## 2020-02-10 PROCEDURE — 96367 TX/PROPH/DG ADDL SEQ IV INF: CPT

## 2020-02-10 PROCEDURE — 93005 ELECTROCARDIOGRAM TRACING: CPT

## 2020-02-10 PROCEDURE — 3066F NEPHROPATHY DOC TX: CPT | Performed by: PSYCHIATRY & NEUROLOGY

## 2020-02-10 PROCEDURE — 99215 OFFICE O/P EST HI 40 MIN: CPT | Performed by: PSYCHIATRY & NEUROLOGY

## 2020-02-10 PROCEDURE — 84484 ASSAY OF TROPONIN QUANT: CPT | Performed by: EMERGENCY MEDICINE

## 2020-02-10 PROCEDURE — 1036F TOBACCO NON-USER: CPT | Performed by: PSYCHIATRY & NEUROLOGY

## 2020-02-10 PROCEDURE — 1111F DSCHRG MED/CURRENT MED MERGE: CPT | Performed by: PSYCHIATRY & NEUROLOGY

## 2020-02-10 PROCEDURE — 99285 EMERGENCY DEPT VISIT HI MDM: CPT | Performed by: EMERGENCY MEDICINE

## 2020-02-10 PROCEDURE — 3077F SYST BP >= 140 MM HG: CPT | Performed by: PSYCHIATRY & NEUROLOGY

## 2020-02-10 PROCEDURE — 82948 REAGENT STRIP/BLOOD GLUCOSE: CPT

## 2020-02-10 PROCEDURE — 70496 CT ANGIOGRAPHY HEAD: CPT

## 2020-02-10 PROCEDURE — 84484 ASSAY OF TROPONIN QUANT: CPT | Performed by: FAMILY MEDICINE

## 2020-02-10 PROCEDURE — 36415 COLL VENOUS BLD VENIPUNCTURE: CPT | Performed by: EMERGENCY MEDICINE

## 2020-02-10 PROCEDURE — 96375 TX/PRO/DX INJ NEW DRUG ADDON: CPT

## 2020-02-10 PROCEDURE — 80053 COMPREHEN METABOLIC PANEL: CPT | Performed by: EMERGENCY MEDICINE

## 2020-02-10 RX ORDER — LABETALOL 20 MG/4 ML (5 MG/ML) INTRAVENOUS SYRINGE
20 ONCE
Status: COMPLETED | OUTPATIENT
Start: 2020-02-10 | End: 2020-02-10

## 2020-02-10 RX ORDER — INSULIN GLARGINE 100 [IU]/ML
40 INJECTION, SOLUTION SUBCUTANEOUS
Status: DISCONTINUED | OUTPATIENT
Start: 2020-02-10 | End: 2020-02-11

## 2020-02-10 RX ORDER — INSULIN GLARGINE 100 [IU]/ML
45 INJECTION, SOLUTION SUBCUTANEOUS EVERY MORNING
Status: DISCONTINUED | OUTPATIENT
Start: 2020-02-11 | End: 2020-02-10 | Stop reason: SDUPTHER

## 2020-02-10 RX ORDER — HYDROCHLOROTHIAZIDE 25 MG/1
25 TABLET ORAL DAILY
Status: DISCONTINUED | OUTPATIENT
Start: 2020-02-10 | End: 2020-02-11

## 2020-02-10 RX ORDER — ACETAMINOPHEN 325 MG/1
650 TABLET ORAL EVERY 6 HOURS SCHEDULED
Status: DISCONTINUED | OUTPATIENT
Start: 2020-02-10 | End: 2020-02-12 | Stop reason: HOSPADM

## 2020-02-10 RX ORDER — ALBUTEROL SULFATE 90 UG/1
2 AEROSOL, METERED RESPIRATORY (INHALATION) EVERY 6 HOURS PRN
Status: DISCONTINUED | OUTPATIENT
Start: 2020-02-10 | End: 2020-02-12 | Stop reason: HOSPADM

## 2020-02-10 RX ORDER — INSULIN GLARGINE 100 [IU]/ML
45 INJECTION, SOLUTION SUBCUTANEOUS
Status: DISCONTINUED | OUTPATIENT
Start: 2020-02-10 | End: 2020-02-10

## 2020-02-10 RX ORDER — GABAPENTIN 100 MG/1
100 CAPSULE ORAL 2 TIMES DAILY
Status: DISCONTINUED | OUTPATIENT
Start: 2020-02-10 | End: 2020-02-12 | Stop reason: HOSPADM

## 2020-02-10 RX ORDER — ACETAMINOPHEN 325 MG/1
975 TABLET ORAL ONCE
Status: COMPLETED | OUTPATIENT
Start: 2020-02-10 | End: 2020-02-10

## 2020-02-10 RX ORDER — AMLODIPINE BESYLATE 10 MG/1
10 TABLET ORAL DAILY
Status: DISCONTINUED | OUTPATIENT
Start: 2020-02-10 | End: 2020-02-10

## 2020-02-10 RX ORDER — METOCLOPRAMIDE HYDROCHLORIDE 5 MG/ML
10 INJECTION INTRAMUSCULAR; INTRAVENOUS ONCE
Status: COMPLETED | OUTPATIENT
Start: 2020-02-10 | End: 2020-02-10

## 2020-02-10 RX ORDER — ATORVASTATIN CALCIUM 40 MG/1
40 TABLET, FILM COATED ORAL DAILY
Status: DISCONTINUED | OUTPATIENT
Start: 2020-02-10 | End: 2020-02-12 | Stop reason: HOSPADM

## 2020-02-10 RX ORDER — INSULIN GLARGINE 100 [IU]/ML
40 INJECTION, SOLUTION SUBCUTANEOUS EVERY MORNING
Status: DISCONTINUED | OUTPATIENT
Start: 2020-02-11 | End: 2020-02-10

## 2020-02-10 RX ORDER — BUDESONIDE AND FORMOTEROL FUMARATE DIHYDRATE 80; 4.5 UG/1; UG/1
2 AEROSOL RESPIRATORY (INHALATION) DAILY
Status: DISCONTINUED | OUTPATIENT
Start: 2020-02-11 | End: 2020-02-12 | Stop reason: HOSPADM

## 2020-02-10 RX ORDER — LISINOPRIL 20 MG/1
40 TABLET ORAL DAILY
Status: DISCONTINUED | OUTPATIENT
Start: 2020-02-10 | End: 2020-02-10

## 2020-02-10 RX ORDER — LOSARTAN POTASSIUM 50 MG/1
100 TABLET ORAL DAILY
Status: DISCONTINUED | OUTPATIENT
Start: 2020-02-10 | End: 2020-02-12 | Stop reason: HOSPADM

## 2020-02-10 RX ORDER — MAGNESIUM SULFATE HEPTAHYDRATE 40 MG/ML
2 INJECTION, SOLUTION INTRAVENOUS ONCE
Status: COMPLETED | OUTPATIENT
Start: 2020-02-10 | End: 2020-02-10

## 2020-02-10 RX ORDER — PREDNISONE 10 MG/1
10 TABLET ORAL DAILY
Status: DISCONTINUED | OUTPATIENT
Start: 2020-02-10 | End: 2020-02-12 | Stop reason: HOSPADM

## 2020-02-10 RX ADMIN — IOHEXOL 100 ML: 350 INJECTION, SOLUTION INTRAVENOUS at 12:51

## 2020-02-10 RX ADMIN — INSULIN GLARGINE 40 UNITS: 100 INJECTION, SOLUTION SUBCUTANEOUS at 23:00

## 2020-02-10 RX ADMIN — ACETAMINOPHEN 975 MG: 325 TABLET ORAL at 12:45

## 2020-02-10 RX ADMIN — ATORVASTATIN CALCIUM 40 MG: 40 TABLET, FILM COATED ORAL at 17:21

## 2020-02-10 RX ADMIN — SODIUM CHLORIDE 1000 ML: 0.9 INJECTION, SOLUTION INTRAVENOUS at 12:44

## 2020-02-10 RX ADMIN — METOCLOPRAMIDE 10 MG: 5 INJECTION, SOLUTION INTRAMUSCULAR; INTRAVENOUS at 12:45

## 2020-02-10 RX ADMIN — GABAPENTIN 100 MG: 100 CAPSULE ORAL at 17:21

## 2020-02-10 RX ADMIN — LABETALOL 20 MG/4 ML (5 MG/ML) INTRAVENOUS SYRINGE 20 MG: at 11:42

## 2020-02-10 RX ADMIN — INSULIN LISPRO 12 UNITS: 100 INJECTION, SOLUTION INTRAVENOUS; SUBCUTANEOUS at 17:19

## 2020-02-10 RX ADMIN — ACETAMINOPHEN 650 MG: 325 TABLET ORAL at 23:46

## 2020-02-10 RX ADMIN — VALPROATE SODIUM 1000 MG: 100 INJECTION, SOLUTION INTRAVENOUS at 14:05

## 2020-02-10 RX ADMIN — MAGNESIUM SULFATE HEPTAHYDRATE 2 G: 40 INJECTION, SOLUTION INTRAVENOUS at 13:36

## 2020-02-10 NOTE — ED ATTENDING ATTESTATION
Wyatt Bloom MD, saw and evaluated the patient  All available labs and X-rays were ordered by me or the resident and have been reviewed by myself  I discussed the patient with the resident / non-physician and agree with the resident's / non-physician practitioner's findings and plan as documented in the resident's / non-physician practicitioner's note, except where noted  At this point, I agree with the current assessment done in the ED  I was present during key portions of all procedures performed unless otherwise stated  Chief Complaint   Patient presents with    Hypertension     coming from Neurology (dx: with bleeds), found to be hypertensive  Recently dx "with aneurysms " Pt has been taken off Diltiazem d/t inability to refill meds  +lightheaded     This is a 62 y o  female presenting for evaluation of headache in setting of aneurysms  The patient has long standing HTN, the point that she is on 5 different medications  She is normally on amlodipine, losartant, lisinopril, metoprolol, diltiazem  She ran out 2 weeks ago of the dilt but is compliant with the other 4  She has been having a headache for months  It's actually better today than other days, saying it's normally a 10, but today it's a 6/10  She followed up with neurology who sent her here for CTA H/N + admission  Denies f/ch/n/v/cp/sob  No falls/trauma  Denies any urinary tract infection symptoms (burning, itching, pain, blood, frequency)  PE:  Vitals:    02/11/20 1528 02/11/20 1700 02/11/20 1910 02/12/20 0731   BP: (!) 177/101 (!) 172/97 154/87 (!) 142/116   Pulse: 89 (!) 109 84 73   Resp: 16  16    Temp: 98 3 °F (36 8 °C)   98 1 °F (36 7 °C)   TempSrc:       SpO2: 92%  98% 95%   Weight:       Height:       General: VSS, NAD, awake, alert  Well-nourished, well-developed  Appears stated age  Speaking normally in full sentences  Head: Normocephalic, atraumatic, nontender  Eyes: PERRL, EOM-I  No diplopia  No hyphema     No subconjunctival hemorrhages  Symmetrical lids  ENT: Atraumatic external nose and ears  MMM  No malocclusion  No stridor  Normal phonation  No drooling  Normal swallowing  Neck: Symmetric, trachea midline  No JVD  CV: RRR  +S1/S2  No murmurs or gallops  Peripheral pulses +2 throughout  No chest wall tenderness  Lungs:   Unlabored No retractions  CTAB, lungs sounds equal bilateral    No tachypnea  Abd: +BS, soft, NT/ND    MSK:   FROM   Back:   No rashes  Skin: Dry, intact  Neuro: AAOx3, GCS 15, CN II-XII grossly intact  Motor grossly intact  EHL/FHL/PF/DF/KF/KE/HF/HE 5/5  M/U/R/A nerve sensation bilateral intact and equal    strength 5/5  Good capillary refill  2+ radial pulses, bilaterally equal    BICEPS/TRICEPS 5/5  Shoulder abduction/adduction 5/5  No pronator drift  No aphasia/dysarthria  CN 2-12 intact  No nystagmus  No facial droop  Psychiatric/Behavioral: Appropriate mood and affect   Exam: deferred  A:  - Headache (acute/chronic)  - HTN  P:  - CTA H/N  - end-organ damage labs  - control headache  - dispo, likely admission  - 13 point ROS was performed and all are normal unless stated in the history above  - Nursing note reviewed  Vitals reviewed  - Orders placed by myself and/or advanced practitioner / resident     - Previous chart was reviewed  - No language barrier    - History obtained from patient  - There are no limitations to the history obtained  - Critical care time: Not applicable for this patient  Code Status: Level 1 - Full Code  Advance Directive and Living Will:      Power of :    POLST:      Final Diagnosis:  1  Uncontrolled hypertension    2   Aneurysm (HCC)           Medications   atorvastatin (LIPITOR) tablet 40 mg (40 mg Oral Given 2/12/20 0800)   albuterol (PROVENTIL HFA,VENTOLIN HFA) inhaler 2 puff (has no administration in time range)   budesonide-formoterol (SYMBICORT) 80-4 5 MCG/ACT inhaler 2 puff (2 puffs Inhalation Given 2/12/20 0801)   gabapentin (NEURONTIN) capsule 100 mg (100 mg Oral Given 2/12/20 0800)   losartan (COZAAR) tablet 100 mg (100 mg Oral Given 2/12/20 0759)   predniSONE tablet 10 mg (10 mg Oral Given 2/12/20 0801)   acetaminophen (TYLENOL) tablet 650 mg ( Oral Canceled Entry 2/12/20 0800)   insulin lispro (HumaLOG) 100 units/mL subcutaneous injection 2-12 Units (2 Units Subcutaneous Not Given 2/12/20 0803)   insulin lispro (HumaLOG) 100 units/mL subcutaneous injection 25 Units (25 Units Subcutaneous Given 2/12/20 0802)   Labetalol HCl (NORMODYNE) injection 10 mg (0 mg Intravenous Hold 2/11/20 1908)   insulin glargine (LANTUS) subcutaneous injection 45 Units 0 45 mL (45 Units Subcutaneous Given 2/12/20 0801)   diltiazem (CARDIZEM CD) 24 hr capsule 480 mg (480 mg Oral Given 2/12/20 0801)   insulin glargine (LANTUS) subcutaneous injection 45 Units 0 45 mL (45 Units Subcutaneous Given 2/11/20 2155)   insulin lispro (HumaLOG) 100 units/mL subcutaneous injection 4-20 Units (20 Units Subcutaneous Given 2/11/20 2151)   insulin lispro (HumaLOG) 100 units/mL subcutaneous injection 4-20 Units (4 Units Subcutaneous Given 2/12/20 0216)   sodium chloride 0 9 % infusion (125 mL/hr Intravenous New Bag 2/12/20 0056)   Labetalol HCl (NORMODYNE) injection 20 mg (20 mg Intravenous Given 2/10/20 1142)   acetaminophen (TYLENOL) tablet 975 mg (975 mg Oral Given 2/10/20 1245)   metoclopramide (REGLAN) injection 10 mg (10 mg Intravenous Given 2/10/20 1245)   sodium chloride 0 9 % bolus 1,000 mL (0 mL Intravenous Stopped 2/10/20 1535)   iohexol (OMNIPAQUE) 350 MG/ML injection (MULTI-DOSE) 100 mL (100 mL Intravenous Given 2/10/20 1251)   valproate (DEPACON) 1,000 mg in sodium chloride 0 9 % 50 mL BOLUS (0 mg Intravenous Stopped 2/10/20 1430)   magnesium sulfate 2 g/50 mL IVPB (premix) 2 g (0 g Intravenous Stopped 2/10/20 1403)   diltiazem (CARDIZEM CD) 24 hr capsule 240 mg (240 mg Oral Given 2/11/20 1526)   sodium chloride 0 9 % bolus 1,000 mL (1,000 mL Intravenous New Bag 2/11/20 2151)   insulin regular (HumuLIN R,NovoLIN R) injection 15 Units (15 Units Subcutaneous Given 2/11/20 2237)     CTA head and neck with and without contrast   ED Interpretation      No acute intracranial hemorrhage  Mild cerebral chronic microangiopathic disease, presumably secondary to hypertension  Stable 3 mm left cavernous ICA aneurysms when comparing to the prior study of September 23, 2019  Generalized parenchymal volume loss, accelerated for age, somewhat temporal and frontal predominant  Correlate for underlying neurodegenerative disease or clinical signs and symptoms of NPH  Consider follow-up MR NeuroQuant imaging  3 mm extradural and cavernous cave left ICA aneurysms  Neurosurgical consultation is recommended  Workstation performed: [id number]         Final Result      No acute intracranial hemorrhage  Mild cerebral chronic microangiopathic disease, presumably secondary to hypertension  Stable 3 mm left cavernous ICA aneurysms when comparing to the prior study of September 23, 2019  Generalized parenchymal volume loss, accelerated for age, somewhat temporal and frontal predominant  Correlate for underlying neurodegenerative disease or clinical signs and symptoms of NPH  Consider follow-up MR NeuroQuant imaging  3 mm extradural and cavernous cave left ICA aneurysms  Neurosurgical consultation is recommended                             Workstation performed: [id number]         US kidney and bladder    (Results Pending)     Orders Placed This Encounter   Procedures    CTA head and neck with and without contrast    US kidney and bladder    CBC and differential    Comprehensive metabolic panel    Troponin I    Troponin I    CBC and differential    Comprehensive metabolic panel    Magnesium    Phosphorus    Comprehensive metabolic panel    Diet Adria/CHO Controlled; Consistent Carbohydrate Diet Level 1 (4 carb servings/60 grams CHO/meal)    Insert peripheral IV    Nursing communication Continue IV as ordered  Sarah Shen Notify admitting physician    Notify admitting physician on arrival    Vital Signs per unit routine    Up with assistance    Apply SCD only    Insulin Subcutaneous Notify Physician    Hypoglycemial Protocol    Insulin Subcutaneous Instruction    Fingerstick Glucose (POCT) Before meals and at bedtime    Fingerstick Glucose (POCT)    24 Hour Telemetry Monitoring    Hypoglycemial Protocol    Nursing Communication Please check glucose 1 hour after regular insulin      Neuro checks    Level 1-Full Code: all life saving measures are indicated    Inpatient consult to Neurosurgery    OT eval and treat    PT eval and treat    EKG RESULTS    ECG 12 lead    ECG 12 lead    Place in Observation    Update level of care    Update level of care    Update level of care     Labs Reviewed   CBC AND DIFFERENTIAL - Abnormal       Result Value Ref Range Status    WBC 10 93 (*) 4 31 - 10 16 Thousand/uL Final    RBC 4 94  3 81 - 5 12 Million/uL Final    Hemoglobin 11 2 (*) 11 5 - 15 4 g/dL Final    Hematocrit 37 6  34 8 - 46 1 % Final    MCV 76 (*) 82 - 98 fL Final    MCH 22 7 (*) 26 8 - 34 3 pg Final    MCHC 29 8 (*) 31 4 - 37 4 g/dL Final    RDW 18 2 (*) 11 6 - 15 1 % Final    MPV 10 5  8 9 - 12 7 fL Final    Platelets 747  747 - 390 Thousands/uL Final    nRBC 0  /100 WBCs Final    Neutrophils Relative 70  43 - 75 % Final    Immat GRANS % 1  0 - 2 % Final    Lymphocytes Relative 22  14 - 44 % Final    Monocytes Relative 4  4 - 12 % Final    Eosinophils Relative 2  0 - 6 % Final    Basophils Relative 1  0 - 1 % Final    Neutrophils Absolute 7 74 (*) 1 85 - 7 62 Thousands/µL Final    Immature Grans Absolute 0 07  0 00 - 0 20 Thousand/uL Final    Lymphocytes Absolute 2 43  0 60 - 4 47 Thousands/µL Final    Monocytes Absolute 0 48  0 17 - 1 22 Thousand/µL Final    Eosinophils Absolute 0 16  0 00 - 0 61 Thousand/µL Final    Basophils Absolute 0 05  0 00 - 0 10 Thousands/µL Final   COMPREHENSIVE METABOLIC PANEL - Abnormal    Sodium 139  136 - 145 mmol/L Final    Potassium 3 5  3 5 - 5 3 mmol/L Final    Chloride 102  100 - 108 mmol/L Final    CO2 30  21 - 32 mmol/L Final    ANION GAP 7  4 - 13 mmol/L Final    BUN 14  5 - 25 mg/dL Final    Creatinine 0 91  0 60 - 1 30 mg/dL Final    Comment: Standardized to IDMS reference method    Glucose 261 (*) 65 - 140 mg/dL Final    Comment:   If the patient is fasting, the ADA then defines impaired fasting glucose as > 100 mg/dL and diabetes as > or equal to 123 mg/dL  Specimen collection should occur prior to Sulfasalazine administration due to the potential for falsely depressed results  Specimen collection should occur prior to Sulfapyridine administration due to the potential for falsely elevated results  Calcium 9 5  8 3 - 10 1 mg/dL Final    AST 12  5 - 45 U/L Final    Comment:   Specimen collection should occur prior to Sulfasalazine administration due to the potential for falsely depressed results  ALT 19  12 - 78 U/L Final    Comment:   Specimen collection should occur prior to Sulfasalazine and/or Sulfapyridine administration due to the potential for falsely depressed results       Alkaline Phosphatase 152 (*) 46 - 116 U/L Final    Total Protein 7 8  6 4 - 8 2 g/dL Final    Albumin 3 0 (*) 3 5 - 5 0 g/dL Final    Total Bilirubin 0 28  0 20 - 1 00 mg/dL Final    eGFR 79  ml/min/1 73sq m Final    Narrative:     [place] guidelines for Chronic Kidney Disease (CKD):     Stage 1 with normal or high GFR (GFR > 90 mL/min/1 73 square meters)    Stage 2 Mild CKD (GFR = 60-89 mL/min/1 73 square meters)    Stage 3A Moderate CKD (GFR = 45-59 mL/min/1 73 square meters)    Stage 3B Moderate CKD (GFR = 30-44 mL/min/1 73 square meters)    Stage 4 Severe CKD (GFR = 15-29 mL/min/1 73 square meters)    Stage 5 End Stage CKD (GFR <15 mL/min/1 73 square meters)  Note: GFR calculation is accurate only with a steady state creatinine   POCT GLUCOSE - Abnormal    POC Glucose 472 (*) 65 - 140 mg/dl Final   TROPONIN I - Normal    Troponin I <0 02  <=0 04 ng/mL Final    Comment:   S[employer]ens Chemistry analyzer 99% cutoff is > 0 04 ng/mL in network labs     o cTnI 99% cutoff is useful only when applied to patients in the clinical setting of myocardial ischemia   o cTnI 99% cutoff should be interpreted in the context of clinical history, ECG findings and possibly cardiac imaging to establish correct diagnosis  o cTnI 99% cutoff may be suggestive but clearly not indicative of a coronary event without the clinical setting of myocardial ischemia  TROPONIN I - Normal    Troponin I 0 02  <=0 04 ng/mL Final    Comment:   [employer]ens Chemistry analyzer 99% cutoff is > 0 04 ng/mL in network labs     o cTnI 99% cutoff is useful only when applied to patients in the clinical setting of myocardial ischemia   o cTnI 99% cutoff should be interpreted in the context of clinical history, ECG findings and possibly cardiac imaging to establish correct diagnosis  o cTnI 99% cutoff may be suggestive but clearly not indicative of a coronary event without the clinical setting of myocardial ischemia  TROPONIN I - Normal    Troponin I 0 02  <=0 04 ng/mL Final    Comment:   S[employer]ens Chemistry analyzer 99% cutoff is > 0 04 ng/mL in network labs     o cTnI 99% cutoff is useful only when applied to patients in the clinical setting of myocardial ischemia   o cTnI 99% cutoff should be interpreted in the context of clinical history, ECG findings and possibly cardiac imaging to establish correct diagnosis  o cTnI 99% cutoff may be suggestive but clearly not indicative of a coronary event without the clinical setting of myocardial ischemia         Time reflects when diagnosis was documented in both MDM as applicable and the Disposition within this note     Time User Action Codes Description Comment    2/10/2020  2:32 PM Michael Atkins Add [I10] Uncontrolled hypertension     2/10/2020  2:33 PM Michael Atkins Add [I72 9] Aneurysm [facility])       ED Disposition     ED Disposition Condition Date/Time Comment    Admit Stable Mon Feb 10, 2020  2:32 PM Case was discussed with FM and the patient's admission status was agreed to be Admission Status: observation status to the service of FM        Follow-up Information     Follow up With Specialties Details Why   Sammi Loyd, MELVA Family Medicine, Physician Assistant Follow up in 1 week(s)  [address]  [address] [facility]  [address]       Suresh Benitez MD Endocrinology   [address] [address]  [phone]          Current Discharge Medication List      START taking these medications    Details   diltiazem (CARDIZEM CD) 240 mg 24 hr capsule Take 2 capsules (480 mg total) by mouth daily  Qty: 60 capsule, Refills: 1    Associated Diagnoses: Uncontrolled hypertension         CONTINUE these medications which have NOT CHANGED    Details   albuterol (PROVENTIL HFA) 90 mcg/act inhaler Inhale 2 puffs every 6 (six) hours as needed for wheezing For another 24 hours use every 4 hours standing  Qty: 6 7 g, Refills: 0    Associated Diagnoses: Mild intermittent asthma with exacerbation      aspirin (ADULT ASPIRIN EC LOW STRENGTH) 81 mg EC tablet Take 81 mg by mouth daily       atorvastatin (LIPITOR) 40 mg tablet TAKE 1 TABLET BY MOUTH EVERY DAY  Qty: 30 tablet, Refills: 0    Associated Diagnoses: Other hyperlipidemia      BASAGLAR KWIKPEN 100 units/mL injection pen 45 units in morning, 40 at night  Qty: 5 pen, Refills: 1    Associated Diagnoses: 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, with long-term current use of insulin (HCC)      SARAH MICROLET LANCETS lancets Inject 1 applicator into the skin 4 (four) times a day      Blood Glucose Monitoring Suppl (CONTOUR NEXT MONITOR) w/Device KIT Disp 1 meter dx E11 9, may submitted any contour next meter  If not covered let us know we can change strips  Qty: 1 kit, Refills: 1    Associated Diagnoses: 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, with long-term current use of insulin ([facility])      Blood Pressure Monitor KIT Check blood pressures BID  Qty: 1 each, Refills: 0    Associated Diagnoses: Hypertensive urgency      budesonide-formoterol (SYMBICORT) 80-4 5 MCG/ACT inhaler Inhale 2 puffs 2 (two) times a day Rinse mouth after use    Qty: 1 Inhaler, Refills: 5    Associated Diagnoses: Mild intermittent asthma with exacerbation      Cholecalciferol (VITAMIN D3) 3000 units TABS Take 3,000 Units by mouth daily       CONTOUR NEXT TEST test strip Test blood sugar 3x per day dx E11 9  Qty: 300 each, Refills: 1    Associated Diagnoses: Type 2 diabetes mellitus with hyperglycemia, unspecified whether long term insulin use ([facility])      diltiazem (TIAZAC) 420 MG 24 hr capsule Take 1 capsule (420 mg total) by mouth daily  Qty: 30 capsule, Refills: 5    Associated Diagnoses: Essential hypertension      ferrous sulfate 324 (65 Fe) mg Take 1 tablet (324 mg total) by mouth every other day  Qty: 45 tablet, Refills: 3    Associated Diagnoses: Microcytic anemia      gabapentin (NEURONTIN) 100 mg capsule Take 1 capsule in am and 2-3 capsules at night  Qty: 90 capsule, Refills: 0    Associated Diagnoses: Diabetic polyneuropathy associated with type 2 diabetes mellitus ([facility])      HUMALOG KWIKPEN 100 units/mL injection pen Take 25 units before meals  Qty: 10 pen, Refills: 3    Associated Diagnoses: 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, with long-term current use of insulin ([facility])      Insulin Pen Needle (BD PEN NEEDLE DERRICK U/F) 32G X 4 MM MISC Inject 1 applicator under the skin 4 (four) times a day      losartan (COZAAR) 100 MG tablet TAKE 1 TABLET BY MOUTH EVERY DAY  Qty: 30 tablet, Refills: 5    Associated Diagnoses: Essential hypertension      predniSONE 5 mg tablet Take 3 tablets (15 mg total) by mouth daily  Qty: 90 tablet, Refills: 2    Associated Diagnoses: PMR (polymyalgia rheumatica) ([facility])         STOP taking these medications       amLODIPine (NORVASC) 10 mg tablet Comments:   Reason for Stopping:         hydrochlorothiazide (HYDRODIURIL) 25 mg tablet Comments:   Reason for Stopping:         labetalol (NORMODYNE) 200 mg tablet Comments:   Reason for Stopping:         lisinopril (ZESTRIL) 20 mg tablet Comments:   Reason for Stopping:         metoprolol tartrate (LOPRESSOR) 25 mg tablet Comments:   Reason for Stopping:         simvastatin (ZOCOR) 40 mg tablet Comments:   Reason for Stopping:         valsartan-hydrochlorothiazide (DIOVAN-HCT) 320-12 5 MG per tablet Comments:   Reason for Stopping:             No discharge procedures on file  Prior to Admission Medications   Prescriptions Last Dose Informant Patient Reported? Taking? BASAGLAR KWIKPEN 100 units/mL injection pen   No No   Si units in morning, 40 at night   SARAH MICROLET LANCETS lancets  Self Yes No   Sig: Inject 1 applicator into the skin 4 (four) times a day   Blood Glucose Monitoring Suppl (CONTOUR NEXT MONITOR) w/Device KIT  Self No No   Sig: Disp 1 meter dx E11 9, may submitted any contour next meter   If not covered let us know we can change strips   Blood Pressure Monitor KIT  Self No No   Sig: Check blood pressures BID   Patient not taking: Reported on 2/10/2020   CONTOUR NEXT TEST test strip  Self No No   Sig: Test blood sugar 3x per day dx E11 9   Cholecalciferol (VITAMIN D3) 3000 units TABS  Self Yes No   Sig: Take 3,000 Units by mouth daily    HUMALOG KWIKPEN 100 units/mL injection pen   No No   Sig: Take 25 units before meals   Insulin Pen Needle (BD PEN NEEDLE DERRICK U/F) 32G X 4 MM MISC  Self Yes No   Sig: Inject 1 applicator under the skin 4 (four) times a day   albuterol (PROVENTIL HFA) 90 mcg/act inhaler  Self No No   Sig: Inhale 2 puffs every 6 (six) hours as needed for wheezing For another 24 hours use every 4 hours standing   amLODIPine (NORVASC) 10 mg tablet   Yes No   Sig: Take 10 mg by mouth daily    aspirin (ADULT ASPIRIN EC LOW STRENGTH) 81 mg EC tablet   Yes No   Sig: Take 81 mg by mouth daily    atorvastatin (LIPITOR) 40 mg tablet   No No   Sig: TAKE 1 TABLET BY MOUTH EVERY DAY   budesonide-formoterol (SYMBICORT) 80-4 5 MCG/ACT inhaler  Self No No   Sig: Inhale 2 puffs 2 (two) times a day Rinse mouth after use  Patient taking differently: Inhale 2 puffs as needed Rinse mouth after use  diltiazem (TIAZAC) 420 MG 24 hr capsule  Self No No   Sig: Take 1 capsule (420 mg total) by mouth daily   Patient not taking: Reported on 2/10/2020   ferrous sulfate 324 (65 Fe) mg   No No   Sig: Take 1 tablet (324 mg total) by mouth every other day   gabapentin (NEURONTIN) 100 mg capsule  Self No No   Sig: Take 1 capsule in am and 2-3 capsules at night   hydrochlorothiazide (HYDRODIURIL) 25 mg tablet   Yes No   Sig: Take 25 mg by mouth daily    labetalol (NORMODYNE) 200 mg tablet   Yes No   Sig: Take 200 mg by mouth daily    lisinopril (ZESTRIL) 20 mg tablet   Yes No   Sig: Take 40 mg by mouth   losartan (COZAAR) 100 MG tablet  Self No No   Sig: TAKE 1 TABLET BY MOUTH EVERY DAY   metoprolol tartrate (LOPRESSOR) 25 mg tablet   Yes No   Sig: Take 12 5 mg by mouth   predniSONE 5 mg tablet  Self No No   Sig: Take 3 tablets (15 mg total) by mouth daily   Patient taking differently: Take 10 mg by mouth daily    simvastatin (ZOCOR) 40 mg tablet   Yes No   Sig: Take 40 mg by mouth daily at bedtime    valsartan-hydrochlorothiazide (DIOVAN-HCT) 320-12 5 MG per tablet   Yes No   Sig: Take 1 tablet by mouth daily       Facility-Administered Medications: None       Portions of the record may have been created with voice recognition software  Occasional wrong word or "sound a like" substitutions may have occurred due to the inherent limitations of voice recognition software  Read the chart carefully and recognize, using context, where substitutions have occurred      Electronically signed by:  Geri Chavez

## 2020-02-10 NOTE — PROGRESS NOTES
SENIOR History and Physical Note - Lauren Santoyo 62 y o  female MRN: [id number]    Unit/Bed#: ED 06 Encounter: [id number]      HPI  Lauren Santoyo is a 62 y o  female who was sent in from the neurologist's office for elevated blood pressures  She has a history of aneurysm found back in September 2019 and chronic headaches that have been worsening  She was sent to the ED from neurologist office after blood pressure reading of 201/99 without any neurological deficit  She has a history of hypertension ,diabetes with diabetic retinopathy status post transplant over 10 years ago and polymyalgia rheumatica for which she is on prednisone 10 mg daily  She says she has been having headaches for few months now  She states her headache is currently a 6/10 and to get this bad as it 10 at 10  She denied any nausea, vomiting confusion, visual disturbance since beyond her baseline, chest pain , shortness of breath, abdominal pain, weakness or syncope patient admits she not take any of her medication today  In presentation in ED her blood pressure was 239/116  This improved after 20 mg of labetalol  Neurosurgery was consulted in the ED  There was no immediate intervention planned  Recommended to admit for observation Given Imaging and to control blood pressure  Per ED resident They recommended repeat imaging possible tomorrow as well as renal ultrasound to assess for renal stenosis for cause of elevated blood pressures  Blood work, imaging, physical exam  Review of blood work, imaging and physical examination revealed   -Stable 3 mm left cavernous ICA aneurysms when comparing to the prior study of September 23, 2019  Physical Exam   Constitutional: She is oriented to person, place, and time  She appears well-developed and well-nourished  No distress  HENT:   Mouth/Throat: Oropharynx is clear and moist  No oropharyngeal exudate  Eyes: EOM are normal  No scleral icterus  Neck: Normal range of motion   Neck supple  Cardiovascular: Normal rate, regular rhythm and normal heart sounds  Pulmonary/Chest: Effort normal and breath sounds normal  No respiratory distress  Lymphadenopathy:     She has no cervical adenopathy  Neurological: She is alert and oriented to person, place, and time  No cranial nerve deficit or sensory deficit  Upper and lower extremity strength 5/5   Skin: She is not diaphoretic  Nursing note and vitals reviewed  Assessment and Plan  please see full H&P by Dr Aster Parisi     Stable 3 mm left ICA aneurysm  -consult neurosurgery pressure at [place]  -control blood pressures  -High risk of rupture will hold off on chemical DVT prophylaxis  -will admit to step-down level to for more frequent neuro checks and vital signs  -No focal neurological deficits on exam    Hypertensive emergency  -blood pressure initially 239/116 which improved after labetalol 20 mg  -will restart home medications  -will add labetalol p r n   -given aneurysm low threshold for starting drip for tight antihypertensive control  -patient on a lot of duplicate antihypertensives med list not clear  For example she is on lisinopril as well as losartan also on amlodipine and diltiazem  She says she was on diltiazem 420 mg for her blood pressure prescribe Dr Philip Shaffer  but prescription ran out she has not been taking it for last few weeks      Diabetes  -will continue home regimen of Lantus 40 units at bedtime and 45 units in the morning  -will hold mealtime Humalog 25 units with meals and start on sliding scale    Polymyalgia rheumatica  -continue prednisone 10 mg daily    Dilated ascending aorta and dilated pulmonary artery  - 9/21/2019:Stable enlargement of the ascending thoracic aorta measuring 4 2 cm and is stable enlargement of the main pulmonary artery measuring 3 7 cm  - previously be managed with diltiazem to 420 mg daily by Cardiology   -Consider cardiology consult    I have personally seen and examined patient   I agree with the intern's documentation in the history and physical  Please contact [facility] medicine via tigertext with any questions  Discussed above with Dr Jean-Paul Guthrie MD  [facility] PGY2  2/10/2020  4:16 PM

## 2020-02-10 NOTE — ED NOTES
Ambulatory to restroom with no assistance needed  Given urine cup        Jemal Valencia RN  02/10/20 4159

## 2020-02-10 NOTE — H&P
H&P- Debbie Vaughn 1960, 62 y o  female MRN: [id number]    Unit/Bed#: [facility] 729-01 Encounter: [id number]    Primary Care Provider: Gatito Cardona PA-C   Date and time admitted to hospital: 2/10/2020 10:48 AM        * Left cavernous carotid aneurysm  Assessment & Plan  CTA head/neck showed stable 3 mm left cavernous ICA aneurysms when comparing to the prior study of September 23, 2019   -No focal neurological deficits on exam  Will appreciate neurovascular surgery recommendations  High risk of rupture will hold off on chemical DVT prophylaxis  Will admit to step-down level 2 for more frequent neuro checks and vital signs    Hypertensive urgency  Assessment & Plan  Elevated BP to 201/99 in the office, 230s/110s in ED, improved with labetalol 20 mg in ED, trending down to 150s/80s  Associated with headache, no changes in vision, chest pain, or SOB  Random BPs at office visits 120/70, 140/70 in 1/2020  Home regimen includes amlodipine 10 mg QD, Losartan 100 mg QD, Lopressor 25 mg QD, Cardizem 420 mg QD, HCTZ 25 mg QD  Compliant, but ran out of cardizem the last 2 weeks  This elevated BP episode could be from chronic systemic steroids use for polymyalgia rheumatica started 11/6/2020, currently on Prednisone 10 mg QD  Will continue PTA meds, monitor BP closely  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, with long-term current use of insulin [facility])  Assessment & Plan  Lab Results   Component Value Date    HGBA1C 9 3 (H) 12/06/2019       Recent Labs     02/10/20  1716 02/10/20  2138   POCGLU 472* 379*       Blood Sugar Average: Last 72 hrs:  (P) 425 5   Home regimen Humalog 25U before each meal, and basaglar 40U bid  Will start Lantus 40U HS and SSI #4  Monitor BG closely      PMR (polymyalgia rheumatica) ([facility])  Assessment & Plan  Rheumatology following  Currently on Prednisone 10 mg QD    Class 3 severe obesity with serious comorbidity and body mass index (BMI) of 45 0 to 49 9 in adult [facility])  Assessment & Plan  Will benefit from dietician consult given DM, HTN, PMR, and obesity  DVT Prophylaxis- none, given risk of ruptured aneurysm  Diet-diabetic  Code Status-level 1  Disposition- anticipate to stay 1 midnight    Discussed with attending physician, Dr Lizbet Bautista, who agrees with the assessment and plan  SUBJECTIVE  HPI:  62 y o  female with past medical history significant for HTN, DM, PMR, presenting to ED via EMS from neurologist's office for elevated /99 in the setting of left cavernous ICA aneurysms which was shown on MRA in 9/2019  Patient has had chronic headache for over a year, 6/10, following with neurology  No changes in intensity or pattern of headache today  Denies changes in vision, chest pain, SOB, syncope, or weakness  No fever, chills, N/V, or recent illnesses  Patient has HTN, compliant with meds, but reports "ran out of cardizem last 2 weeks"  Also has DM type 2 with legal bilateral blindness, s/p transplant over 10 years ago  History of polymyalgia rheumatica, following with rheumatology, started Prednisone at 20 mg daily since 11/6/2019, tapering down to 10 mg daily  In ED, BP 230s/110s, improved with labetalol 20 mg IV  Her headache was treated with Mag sulfate 2g, Reglan 10mg, Valproate 1g, and NS 1L bolus  Review of Systems   Constitutional: Negative for chills and fever  HENT: Negative for congestion and nosebleeds  Eyes: Negative for visual disturbance  Respiratory: Negative for shortness of breath  Cardiovascular: Negative for chest pain  Gastrointestinal: Negative for abdominal pain, nausea and vomiting  Musculoskeletal: Negative for arthralgias  Skin: Negative for color change  Neurological: Positive for headaches  Negative for syncope and weakness  Psychiatric/Behavioral: Negative for agitation and behavioral problems  All other systems reviewed and are negative          Historical Information   Past Medical History: Diagnosis Date    Anemia     Arthritis     Dyspnea on exertion     Hyperlipidemia     Hypertension     Legally blind     Mild intermittent asthma with exacerbation 2018    Miscarriage     x 3    Seizures (HCC)     Sickle cell trait ([address] )     Sleep apnea      Past Surgical History:   Procedure Laterality Date    CATARACT EXTRACTION Bilateral     august and september    EYE SURGERY      HYSTERECTOMY      39    OOPHORECTOMY Left     39    [place] TEMPORAL ARTERY LIGATN OR BX Bilateral 10/17/2019    Procedure: BIOPSY ARTERY TEMPORAL;  Surgeon: Dominique Raza DO;  Location: [facility];  Service: Vascular     Social History   Social History     Substance and Sexual Activity   Alcohol Use Never    Alcohol/week: 0 0 standard drinks    Frequency: Never    Drinks per session: Patient refused    Binge frequency: Never     Social History     Substance and Sexual Activity   Drug Use No     Social History     Tobacco Use   Smoking Status Never Smoker   Smokeless Tobacco Never Used     Family History:   Family History   Problem Relation Age of Onset    Heart attack Mother     Heart disease Mother     Hypertension Mother     No Known Problems Father     Heart disease Sister     No Known Problems Brother     No Known Problems Son     No Known Problems Daughter     Heart attack Maternal Grandmother     Heart disease Maternal Grandmother     Diabetes Other     Heart attack Other     No Known Problems Sister     No Known Problems Sister     No Known Problems Paternal Aunt     No Known Problems Son     Cancer Neg Hx     Stroke Neg Hx        Medications:  Meds/Allergies   PTA meds:   Prior to Admission Medications   Prescriptions Last Dose Informant Patient Reported? Taking?    BASAGLAR KWIKPEN 100 units/mL injection pen   No No   Si units in morning, 40 at night   SARAH MICROLET LANCETS lancets  Self Yes No   Sig: Inject 1 applicator into the skin 4 (four) times a day   Blood Glucose Take 40 mg by mouth   losartan (COZAAR) 100 MG tablet  Self No No   Sig: TAKE 1 TABLET BY MOUTH EVERY DAY   metoprolol tartrate (LOPRESSOR) 25 mg tablet   Yes No   Sig: Take 12 5 mg by mouth   predniSONE 5 mg tablet  Self No No   Sig: Take 3 tablets (15 mg total) by mouth daily   Patient taking differently: Take 10 mg by mouth daily    simvastatin (ZOCOR) 40 mg tablet   Yes No   Sig: Take 40 mg by mouth daily at bedtime    valsartan-hydrochlorothiazide (DIOVAN-HCT) 320-12 5 MG per tablet   Yes No   Sig: Take 1 tablet by mouth daily       Facility-Administered Medications: None     No Known Allergies    OBJECTIVE  Vitals:   Vitals:    02/10/20 1830 02/10/20 1900 02/1960 02/10/20 2142   BP: 161/85 167/83 165/88 164/90   BP Location:  Right arm Right arm    Pulse: 84 84 79 79   Resp: 20 18 18    Temp:    98 2 °F (36 8 °C)   TempSrc:       SpO2: 96% 95% 97% 97%   Weight:             Intake/Output Summary (Last 24 hours) at 2/10/2020 2346  Last data filed at 2/10/2020 1535  Gross per 24 hour   Intake 1050 ml   Output --   Net 1050 ml       Invasive Devices     Peripheral Intravenous Line            Peripheral IV 02/10/20 Left Antecubital less than 1 day                Physical Exam   Constitutional: She is oriented to person, place, and time  No distress  HENT:   Head: Atraumatic  Eyes: Pupils are equal, round, and reactive to light  Conjunctivae are normal  Right eye exhibits no discharge  Left eye exhibits no discharge  Neck: No thyromegaly present  Cardiovascular: Normal rate and regular rhythm  No murmur heard  Pulmonary/Chest: No respiratory distress  She has no rales  Abdominal: She exhibits no distension  There is no tenderness  Musculoskeletal: She exhibits no edema  Neurological: She is alert and oriented to person, place, and time  No cranial nerve deficit or sensory deficit  Skin: Skin is warm  She is not diaphoretic  Psychiatric: She has a normal mood and affect   Her behavior is normal    Nursing note and vitals reviewed  Lab:  I have personally reviewed all pertinent results     Admission on 02/10/2020   Component Date Value Ref Range Status    WBC 02/10/2020 10 93* 4 31 - 10 16 Thousand/uL Final    RBC 02/10/2020 4 94  3 81 - 5 12 Million/uL Final    Hemoglobin 02/10/2020 11 2* 11 5 - 15 4 g/dL Final    Hematocrit 02/10/2020 37 6  34 8 - 46 1 % Final    MCV 02/10/2020 76* 82 - 98 fL Final    MCH 02/10/2020 22 7* 26 8 - 34 3 pg Final    MCHC 02/10/2020 29 8* 31 4 - 37 4 g/dL Final    RDW 02/10/2020 18 2* 11 6 - 15 1 % Final    MPV 02/10/2020 10 5  8 9 - 12 7 fL Final    Platelets 80/14/4742 378  149 - 390 Thousands/uL Final    nRBC 02/10/2020 0  /100 WBCs Final    Neutrophils Relative 02/10/2020 70  43 - 75 % Final    Immat GRANS % 02/10/2020 1  0 - 2 % Final    Lymphocytes Relative 02/10/2020 22  14 - 44 % Final    Monocytes Relative 02/10/2020 4  4 - 12 % Final    Eosinophils Relative 02/10/2020 2  0 - 6 % Final    Basophils Relative 02/10/2020 1  0 - 1 % Final    Neutrophils Absolute 02/10/2020 7 74* 1 85 - 7 62 Thousands/µL Final    Immature Grans Absolute 02/10/2020 0 07  0 00 - 0 20 Thousand/uL Final    Lymphocytes Absolute 02/10/2020 2 43  0 60 - 4 47 Thousands/µL Final    Monocytes Absolute 02/10/2020 0 48  0 17 - 1 22 Thousand/µL Final    Eosinophils Absolute 02/10/2020 0 16  0 00 - 0 61 Thousand/µL Final    Basophils Absolute 02/10/2020 0 05  0 00 - 0 10 Thousands/µL Final    Sodium 02/10/2020 139  136 - 145 mmol/L Final    Potassium 02/10/2020 3 5  3 5 - 5 3 mmol/L Final    Chloride 02/10/2020 102  100 - 108 mmol/L Final    CO2 02/10/2020 30  21 - 32 mmol/L Final    ANION GAP 02/10/2020 7  4 - 13 mmol/L Final    BUN 02/10/2020 14  5 - 25 mg/dL Final    Creatinine 02/10/2020 0 91  0 60 - 1 30 mg/dL Final    Standardized to IDMS reference method    Glucose 02/10/2020 261* 65 - 140 mg/dL Final      If the patient is fasting, the ADA then defines impaired fasting glucose as > 100 mg/dL and diabetes as > or equal to 123 mg/dL  Specimen collection should occur prior to Sulfasalazine administration due to the potential for falsely depressed results  Specimen collection should occur prior to Sulfapyridine administration due to the potential for falsely elevated results   Calcium 02/10/2020 9 5  8 3 - 10 1 mg/dL Final    AST 02/10/2020 12  5 - 45 U/L Final      Specimen collection should occur prior to Sulfasalazine administration due to the potential for falsely depressed results   ALT 02/10/2020 19  12 - 78 U/L Final      Specimen collection should occur prior to Sulfasalazine and/or Sulfapyridine administration due to the potential for falsely depressed results   Alkaline Phosphatase 02/10/2020 152* 46 - 116 U/L Final    Total Protein 02/10/2020 7 8  6 4 - 8 2 g/dL Final    Albumin 02/10/2020 3 0* 3 5 - 5 0 g/dL Final    Total Bilirubin 02/10/2020 0 28  0 20 - 1 00 mg/dL Final    eGFR 02/10/2020 79  ml/min/1 73sq m Final    Troponin I 02/10/2020 <0 02  <=0 04 ng/mL Final      [employer] Chemistry analyzer 99% cutoff is > 0 04 ng/mL in network labs     o cTnI 99% cutoff is useful only when applied to patients in the clinical setting of myocardial ischemia   o cTnI 99% cutoff should be interpreted in the context of clinical history, ECG findings and possibly cardiac imaging to establish correct diagnosis  o cTnI 99% cutoff may be suggestive but clearly not indicative of a coronary event without the clinical setting of myocardial ischemia   Troponin I 02/10/2020 0 02  <=0 04 ng/mL Final      [employer] Chemistry analyzer 99% cutoff is > 0 04 ng/mL in network labs     o cTnI 99% cutoff is useful only when applied to patients in the clinical setting of myocardial ischemia   o cTnI 99% cutoff should be interpreted in the context of clinical history, ECG findings and possibly cardiac imaging to establish correct diagnosis     o cTnI 99% cutoff may be suggestive but clearly not indicative of a coronary event without the clinical setting of myocardial ischemia   POC Glucose 02/10/2020 472* 65 - 140 mg/dl Final    Troponin I 02/10/2020 0 02  <=0 04 ng/mL Final      [employer] Chemistry analyzer 99% cutoff is > 0 04 ng/mL in network labs     o cTnI 99% cutoff is useful only when applied to patients in the clinical setting of myocardial ischemia   o cTnI 99% cutoff should be interpreted in the context of clinical history, ECG findings and possibly cardiac imaging to establish correct diagnosis  o cTnI 99% cutoff may be suggestive but clearly not indicative of a coronary event without the clinical setting of myocardial ischemia   Ventricular Rate 02/10/2020 81  BPM Final    Atrial Rate 02/10/2020 81  BPM Final    SC Interval 02/10/2020 168  ms Final    QRSD Interval 02/10/2020 96  ms Final    QT Interval 02/10/2020 396  ms Final    QTC Interval 02/10/2020 460  ms Final    P Axis 02/10/2020 61  degrees Final    QRS Axis 02/10/2020 -2  degrees Final    T Wave Axis 02/10/2020 44  degrees Final    POC Glucose 02/10/2020 379* 65 - 140 mg/dl Final     Results from last 7 days   Lab Units 02/10/20  1142   POTASSIUM mmol/L 3 5   CHLORIDE mmol/L 102   CO2 mmol/L 30   BUN mg/dL 14   CREATININE mg/dL 0 91   EGFR ml/min/1 73sq m 79   CALCIUM mg/dL 9 5   AST U/L 12   ALT U/L 19   ALK PHOS U/L 152*     Results from last 7 days   Lab Units 02/10/20  1142   WBC Thousand/uL 10 93*   HEMOGLOBIN g/dL 11 2*   PLATELETS Thousands/uL 378           Imaging:  I have personally reviewed all pertinent results  CTA head and neck: No acute intracranial hemorrhage      Mild cerebral chronic microangiopathic disease, presumably secondary to hypertension      Stable 3 mm left cavernous ICA aneurysms when comparing to the prior study of September 23, 2019        Kenneth Olivares MD, PGY-1  [address] [facility]   2/10/2020

## 2020-02-10 NOTE — PROGRESS NOTES
CHW spoke w/Soledad re: update on application status  [facility] informed Lynette Cole application was submitted to [facility] for in-person assessment on Fri, 2/7  Alissa ledbetter to contact [facility]

## 2020-02-10 NOTE — ED PROVIDER NOTES
History  Chief Complaint   Patient presents with    Hypertension     coming from Neurology (dx: with bleeds), found to be hypertensive  Recently dx "with aneurysms " Pt has been taken off Diltiazem d/t inability to refill meds  +lightheaded        68-year-old female presents to the ED for evaluation of hypertension with headache  Patient was at neurology outpatient clinic for evaluation of chronic headaches and her blood pressure is noted to be elevated and she was sent here for evaluation  Patient states that she has been having chronic headaches since 2019 and has not been evaluated or treated for such  Patient states that today's headache is improved from her baseline  Patient's headache is generalized 5/10  Throbbing in nature  Denies nausea/vomiting/photophobia/fever/chills//abdominal pain chest pain/shortness of breath/dyspnea/focal neurological deficits    Patient has significant past medical history diabetes, diabetic related blindness status post implants, hypertension, MOHAN, and remote seizure history  On 2019 the patient was discharged after being admitted for headache and with no safe place to go  Prior to Admission Medications   Prescriptions Last Dose Informant Patient Reported? Taking? BASAGLAR KWIKPEN 100 units/mL injection pen   No No   Si units in morning, 40 at night   [employer] MICROLET LANCETS lancets  Self Yes No   Sig: Inject 1 applicator into the skin 4 (four) times a day   Blood Glucose Monitoring Suppl (CONTOUR NEXT MONITOR) w/Device KIT  Self No No   Sig: Disp 1 meter dx E11 , may submitted any contour next meter   If not covered let us know we can change strips   Blood Pressure Monitor KIT  Self No No   Sig: Check blood pressures BID   Patient not taking: Reported on 2/10/2020   CONTOUR NEXT TEST test strip  Self No No   Sig: Test blood sugar 3x per day dx E11 9   Cholecalciferol (VITAMIN D3) 3000 units TABS  Self Yes No   Sig: Take 3,000 Units by mouth daily    HUMALOG KWIKPEN 100 units/mL injection pen   No No   Sig: Take 25 units before meals   Insulin Pen Needle (BD PEN NEEDLE DERRICK U/F) 32G X 4 MM MISC  Self Yes No   Sig: Inject 1 applicator under the skin 4 (four) times a day   albuterol (PROVENTIL HFA) 90 mcg/act inhaler  Self No No   Sig: Inhale 2 puffs every 6 (six) hours as needed for wheezing For another 24 hours use every 4 hours standing   amLODIPine (NORVASC) 10 mg tablet   Yes No   Sig: Take 10 mg by mouth daily    aspirin (ADULT ASPIRIN EC LOW STRENGTH) 81 mg EC tablet   Yes No   Sig: Take 81 mg by mouth daily    atorvastatin (LIPITOR) 40 mg tablet   No No   Sig: TAKE 1 TABLET BY MOUTH EVERY DAY   budesonide-formoterol (SYMBICORT) 80-4 5 MCG/ACT inhaler  Self No No   Sig: Inhale 2 puffs 2 (two) times a day Rinse mouth after use  Patient taking differently: Inhale 2 puffs as needed Rinse mouth after use     diltiazem (TIAZAC) 420 MG 24 hr capsule  Self No No   Sig: Take 1 capsule (420 mg total) by mouth daily   Patient not taking: Reported on 2/10/2020   ferrous sulfate 324 (65 Fe) mg   No No   Sig: Take 1 tablet (324 mg total) by mouth every other day   gabapentin (NEURONTIN) 100 mg capsule  Self No No   Sig: Take 1 capsule in am and 2-3 capsules at night   hydrochlorothiazide (HYDRODIURIL) 25 mg tablet   Yes No   Sig: Take 25 mg by mouth daily    labetalol (NORMODYNE) 200 mg tablet   Yes No   Sig: Take 200 mg by mouth daily    lisinopril (ZESTRIL) 20 mg tablet   Yes No   Sig: Take 40 mg by mouth   losartan (COZAAR) 100 MG tablet  Self No No   Sig: TAKE 1 TABLET BY MOUTH EVERY DAY   metoprolol tartrate (LOPRESSOR) 25 mg tablet   Yes No   Sig: Take 12 5 mg by mouth   predniSONE 5 mg tablet  Self No No   Sig: Take 3 tablets (15 mg total) by mouth daily   Patient taking differently: Take 10 mg by mouth daily    simvastatin (ZOCOR) 40 mg tablet   Yes No   Sig: Take 40 mg by mouth daily at bedtime    valsartan-hydrochlorothiazide (DIOVAN-HCT) 320-12 5 MG per tablet   Yes No   Sig: Take 1 tablet by mouth daily       Facility-Administered Medications: None       Past Medical History:   Diagnosis Date    Anemia     Arthritis     Dyspnea on exertion     Hyperlipidemia     Hypertension     Legally blind 2010    Mild intermittent asthma with exacerbation 8/4/2018    Miscarriage     x 3    Seizures (HCC)     Sickle cell trait ([facility] [address] )     Sleep apnea        Past Surgical History:   Procedure Laterality Date    CATARACT EXTRACTION Bilateral     august and september    EYE SURGERY      HYSTERECTOMY      44    OOPHORECTOMY Left     39    AR TEMPORAL ARTERY LIGATN OR BX Bilateral 10/17/2019    Procedure: BIOPSY ARTERY TEMPORAL;  Surgeon: Elda Gannon DO;  [facility];  Service: Vascular       Family History   Problem Relation Age of Onset    Heart attack Mother     Heart disease Mother     Hypertension Mother     No Known Problems Father     Heart disease Sister     No Known Problems Brother     No Known Problems Son     No Known Problems Daughter     Heart attack Maternal Grandmother     Heart disease Maternal Grandmother     Diabetes Other     Heart attack Other     No Known Problems Sister     No Known Problems Sister     No Known Problems Paternal Aunt     No Known Problems Son     Cancer Neg Hx     Stroke Neg Hx      I have reviewed and agree with the history as documented  Social History     Tobacco Use    Smoking status: Never Smoker    Smokeless tobacco: Never Used   Substance Use Topics    Alcohol use: Never     Alcohol/week: 0 0 standard drinks     Frequency: Never     Drinks per session: Patient refused     Binge frequency: Never    Drug use: No        Review of Systems   Constitutional: Negative for chills, diaphoresis, fatigue and fever     HENT: Negative for congestion, ear discharge, facial swelling, hearing loss, rhinorrhea, sinus pressure, sinus pain, sneezing, sore throat, tinnitus and trouble swallowing  Eyes: Negative for pain, discharge and redness  Respiratory: Negative for cough, choking, chest tightness, shortness of breath, wheezing and stridor  Cardiovascular: Negative for chest pain, palpitations and leg swelling  Gastrointestinal: Negative for abdominal distention, abdominal pain, blood in stool, constipation, diarrhea, nausea and vomiting  Endocrine: Negative for cold intolerance, polydipsia and polyuria  Genitourinary: Negative for difficulty urinating, dysuria, enuresis, flank pain, frequency and hematuria  Musculoskeletal: Negative for arthralgias, back pain, gait problem and neck stiffness  Skin: Negative for rash and wound  Neurological: Positive for headaches  Negative for dizziness, seizures, syncope, weakness and numbness  Hematological: Negative for adenopathy  Psychiatric/Behavioral: Negative for agitation, confusion, hallucinations, sleep disturbance and suicidal ideas  All other systems reviewed and are negative  Physical Exam  ED Triage Vitals   Temperature Pulse Respirations Blood Pressure SpO2   02/10/20 1100 02/10/20 1052 02/10/20 1052 02/10/20 1052 02/10/20 1052   98 °F (36 7 °C) 97 19 (!) 239/115 99 %      Temp Source Heart Rate Source Patient Position - Orthostatic VS BP Location FiO2 (%)   02/10/20 1100 02/10/20 1052 02/10/20 1403 -- --   Oral Monitor Lying        Pain Score       02/10/20 1052       No Pain             Orthostatic Vital Signs  Vitals:    02/10/20 1130 02/10/20 1215 02/10/20 1300 02/10/20 1403   BP: (!) 209/105 (!) 179/87 (!) 174/82 152/70   Pulse: 96 80 85 86   Patient Position - Orthostatic VS:    Lying       Physical Exam   Constitutional: She is oriented to person, place, and time  She appears well-developed and well-nourished  No distress  HENT:   Head: Normocephalic and atraumatic  Right Ear: External ear normal    Left Ear: External ear normal    Nose: No sinus tenderness  No epistaxis     Mouth/Throat: No oropharyngeal exudate  Eyes: Pupils are equal, round, and reactive to light  EOM are normal  Right eye exhibits no discharge  Left eye exhibits no discharge  Right conjunctiva is injected  Left conjunctiva is injected  Neck: No JVD present  Cardiovascular: Normal rate, regular rhythm, normal heart sounds and intact distal pulses  Exam reveals no gallop and no friction rub  No murmur heard  Pulmonary/Chest: Effort normal and breath sounds normal  No stridor  No respiratory distress  She has no wheezes  She has no rales  Abdominal: Soft  Bowel sounds are normal  She exhibits no distension and no mass  There is no tenderness  There is no rebound and no guarding  Musculoskeletal: Normal range of motion  She exhibits no edema, tenderness or deformity  Lymphadenopathy:     She has no cervical adenopathy  Neurological: She is alert and oriented to person, place, and time  She has normal strength  No cranial nerve deficit or sensory deficit  GCS eye subscore is 4  GCS verbal subscore is 5  GCS motor subscore is 6  Reflex Scores:       Patellar reflexes are 2+ on the right side and 2+ on the left side  UE and LE 5/5 strength, No focal neuro deficits  Skin: Skin is warm, dry and intact  Capillary refill takes less than 2 seconds  She is not diaphoretic  Psychiatric: She has a normal mood and affect  Her speech is normal and behavior is normal  Judgment and thought content normal    Nursing note and vitals reviewed        ED Medications  Medications   Labetalol HCl (NORMODYNE) injection 20 mg (20 mg Intravenous Given 2/10/20 1142)   acetaminophen (TYLENOL) tablet 975 mg (975 mg Oral Given 2/10/20 1245)   metoclopramide (REGLAN) injection 10 mg (10 mg Intravenous Given 2/10/20 1245)   sodium chloride 0 9 % bolus 1,000 mL (1,000 mL Intravenous New Bag 2/10/20 1244)   iohexol (OMNIPAQUE) 350 MG/ML injection (MULTI-DOSE) 100 mL (100 mL Intravenous Given 2/10/20 1251)   valproate (DEPACON) 1,000 mg in sodium chloride 0 9 % 50 mL BOLUS (0 mg Intravenous Stopped 2/10/20 1430)   magnesium sulfate 2 g/50 mL IVPB (premix) 2 g (0 g Intravenous Stopped 2/10/20 1403)       Diagnostic Studies  Results Reviewed     Procedure Component Value Units Date/Time    Troponin I [[id number]]  (Normal) Collected:  02/10/20 1142    Lab Status:  Final result Specimen:  Blood from Arm, Left Updated:  02/10/20 1211     Troponin I <0 02 ng/mL     Comprehensive metabolic panel [[id number]]  (Abnormal) Collected:  02/10/20 1142    Lab Status:  Final result Specimen:  Blood from Arm, Left Updated:  02/10/20 1210     Sodium 139 mmol/L      Potassium 3 5 mmol/L      Chloride 102 mmol/L      CO2 30 mmol/L      ANION GAP 7 mmol/L      BUN 14 mg/dL      Creatinine 0 91 mg/dL      Glucose 261 mg/dL      Calcium 9 5 mg/dL      AST 12 U/L      ALT 19 U/L      Alkaline Phosphatase 152 U/L      Total Protein 7 8 g/dL      Albumin 3 0 g/dL      Total Bilirubin 0 28 mg/dL      eGFR 79 ml/min/1 73sq m     Narrative:       [facility] guidelines for Chronic Kidney Disease (CKD):     Stage 1 with normal or high GFR (GFR > 90 mL/min/1 73 square meters)    Stage 2 Mild CKD (GFR = 60-89 mL/min/1 73 square meters)    Stage 3A Moderate CKD (GFR = 45-59 mL/min/1 73 square meters)    Stage 3B Moderate CKD (GFR = 30-44 mL/min/1 73 square meters)    Stage 4 Severe CKD (GFR = 15-29 mL/min/1 73 square meters)    Stage 5 End Stage CKD (GFR <15 mL/min/1 73 square meters)  Note: GFR calculation is accurate only with a steady state creatinine    CBC and differential [[id number]]  (Abnormal) Collected:  02/10/20 1142    Lab Status:  Final result Specimen:  Blood from Arm, Left Updated:  02/10/20 1156     WBC 10 93 Thousand/uL      RBC 4 94 Million/uL      Hemoglobin 11 2 g/dL      Hematocrit 37 6 %      MCV 76 fL      MCH 22 7 pg      MCHC 29 8 g/dL      RDW 18 2 %      MPV 10 5 fL      Platelets 534 Thousands/uL      nRBC 0 /100 WBCs      Neutrophils Relative 70 %      Immat GRANS % 1 %      Lymphocytes Relative 22 %      Monocytes Relative 4 %      Eosinophils Relative 2 %      Basophils Relative 1 %      Neutrophils Absolute 7 74 Thousands/µL      Immature Grans Absolute 0 07 Thousand/uL      Lymphocytes Absolute 2 43 Thousands/µL      Monocytes Absolute 0 48 Thousand/µL      Eosinophils Absolute 0 16 Thousand/µL      Basophils Absolute 0 05 Thousands/µL                  CTA head and neck with and without contrast   ED Interpretation by Good Garcia DO (02/10 1338)      No acute intracranial hemorrhage  Mild cerebral chronic microangiopathic disease, presumably secondary to hypertension  Stable 3 mm left cavernous ICA aneurysms when comparing to the prior study of September 23, 2019  Generalized parenchymal volume loss, accelerated for age, somewhat temporal and frontal predominant  Correlate for underlying neurodegenerative disease or clinical signs and symptoms of NPH  Consider follow-up MR NeuroQuant imaging  3 mm extradural and cavernous cave left ICA aneurysms  Neurosurgical consultation is recommended  Workstation performed: [id number]         Final Result by Marlon Edwards MD (02/10 1327)      No acute intracranial hemorrhage  Mild cerebral chronic microangiopathic disease, presumably secondary to hypertension  Stable 3 mm left cavernous ICA aneurysms when comparing to the prior study of September 23, 2019  Generalized parenchymal volume loss, accelerated for age, somewhat temporal and frontal predominant  Correlate for underlying neurodegenerative disease or clinical signs and symptoms of NPH  Consider follow-up MR NeuroQuant imaging  3 mm extradural and cavernous cave left ICA aneurysms  Neurosurgical consultation is recommended                             Workstation performed: [id number]               Procedures  Procedures      ED Course MDM  Number of Diagnoses or Management Options  Aneurysm ([facility] [address] ): new and requires workup  Uncontrolled hypertension: new and requires workup  Diagnosis management comments: CTA, Mag, Reglan, Depakote 1000 mg over 25 minutes per neuro, neurosurgery consult    1  Poorly controlled hypertension  -labetalol 20mg  -Admit to Fm    2  Chronic headaches  -CTA per neuro    3  Mild cerebral chronic microangiopathic disease     4  Stable 3 mm left cavernous ICA aneurysms    -NS consult     5  Generalized parenchymal volume loss, accelerated for age     10  3 mm extradural and cavernous cave left ICA aneurysms  Disposition  Final diagnoses:   Uncontrolled hypertension   Aneurysm ([facility] [address] )     Time reflects when diagnosis was documented in both MDM as applicable and the Disposition within this note     Time User Action Codes Description Comment    2/10/2020  2:32 PM Abner Atkins Add [I10] Uncontrolled hypertension     2/10/2020  2:33 PM Abner Atkins Add [I72 9] Aneurysm [facility])       ED Disposition     ED Disposition Condition Date/Time Comment    Admit Stable Mon Feb 10, 2020  2:32 PM Case was discussed with FM and the patient's admission status was agreed to be Admission Status: observation status to the service of FM        Follow-up Information    None         Patient's Medications   Discharge Prescriptions    No medications on file     No discharge procedures on file  ED Provider  Attending physically available and evaluated Lizeth Jackson I managed the patient along with the ED Attending      Electronically Signed by         Eligio Howard DO  02/10/20 3217

## 2020-02-11 ENCOUNTER — APPOINTMENT (OUTPATIENT)
Dept: RADIOLOGY | Facility: HOSPITAL | Age: 60
End: 2020-02-11
Payer: COMMERCIAL

## 2020-02-11 ENCOUNTER — TELEPHONE (OUTPATIENT)
Dept: OTHER | Facility: OTHER | Age: 60
End: 2020-02-11

## 2020-02-11 LAB
ALBUMIN SERPL BCP-MCNC: 3.3 G/DL (ref 3.5–5)
ALP SERPL-CCNC: 149 U/L (ref 46–116)
ALT SERPL W P-5'-P-CCNC: 23 U/L (ref 12–78)
ANION GAP SERPL CALCULATED.3IONS-SCNC: 8 MMOL/L (ref 4–13)
AST SERPL W P-5'-P-CCNC: 23 U/L (ref 5–45)
BASOPHILS # BLD AUTO: 0.05 THOUSANDS/ΜL (ref 0–0.1)
BASOPHILS NFR BLD AUTO: 1 % (ref 0–1)
BILIRUB SERPL-MCNC: 0.5 MG/DL (ref 0.2–1)
BUN SERPL-MCNC: 17 MG/DL (ref 5–25)
CALCIUM SERPL-MCNC: 9.9 MG/DL (ref 8.3–10.1)
CHLORIDE SERPL-SCNC: 95 MMOL/L (ref 100–108)
CO2 SERPL-SCNC: 29 MMOL/L (ref 21–32)
CREAT SERPL-MCNC: 1.39 MG/DL (ref 0.6–1.3)
EOSINOPHIL # BLD AUTO: 0.22 THOUSAND/ΜL (ref 0–0.61)
EOSINOPHIL NFR BLD AUTO: 2 % (ref 0–6)
ERYTHROCYTE [DISTWIDTH] IN BLOOD BY AUTOMATED COUNT: 18.4 % (ref 11.6–15.1)
GFR SERPL CREATININE-BSD FRML MDRD: 47 ML/MIN/1.73SQ M
GLUCOSE SERPL-MCNC: 300 MG/DL (ref 65–140)
GLUCOSE SERPL-MCNC: 371 MG/DL (ref 65–140)
GLUCOSE SERPL-MCNC: 398 MG/DL (ref 65–140)
GLUCOSE SERPL-MCNC: 464 MG/DL (ref 65–140)
GLUCOSE SERPL-MCNC: 491 MG/DL (ref 65–140)
GLUCOSE SERPL-MCNC: 524 MG/DL (ref 65–140)
HCT VFR BLD AUTO: 35.8 % (ref 34.8–46.1)
HGB BLD-MCNC: 10.7 G/DL (ref 11.5–15.4)
IMM GRANULOCYTES # BLD AUTO: 0.05 THOUSAND/UL (ref 0–0.2)
IMM GRANULOCYTES NFR BLD AUTO: 1 % (ref 0–2)
LYMPHOCYTES # BLD AUTO: 3.23 THOUSANDS/ΜL (ref 0.6–4.47)
LYMPHOCYTES NFR BLD AUTO: 33 % (ref 14–44)
MAGNESIUM SERPL-MCNC: 2.1 MG/DL (ref 1.6–2.6)
MCH RBC QN AUTO: 22.6 PG (ref 26.8–34.3)
MCHC RBC AUTO-ENTMCNC: 29.9 G/DL (ref 31.4–37.4)
MCV RBC AUTO: 76 FL (ref 82–98)
MONOCYTES # BLD AUTO: 0.55 THOUSAND/ΜL (ref 0.17–1.22)
MONOCYTES NFR BLD AUTO: 6 % (ref 4–12)
NEUTROPHILS # BLD AUTO: 5.61 THOUSANDS/ΜL (ref 1.85–7.62)
NEUTS SEG NFR BLD AUTO: 57 % (ref 43–75)
NRBC BLD AUTO-RTO: 0 /100 WBCS
PHOSPHATE SERPL-MCNC: 3.9 MG/DL (ref 2.3–4.1)
PLATELET # BLD AUTO: 378 THOUSANDS/UL (ref 149–390)
PMV BLD AUTO: 9.9 FL (ref 8.9–12.7)
POTASSIUM SERPL-SCNC: 4.7 MMOL/L (ref 3.5–5.3)
PROT SERPL-MCNC: 8.4 G/DL (ref 6.4–8.2)
RBC # BLD AUTO: 4.73 MILLION/UL (ref 3.81–5.12)
SODIUM SERPL-SCNC: 132 MMOL/L (ref 136–145)
WBC # BLD AUTO: 9.71 THOUSAND/UL (ref 4.31–10.16)

## 2020-02-11 PROCEDURE — 83735 ASSAY OF MAGNESIUM: CPT | Performed by: FAMILY MEDICINE

## 2020-02-11 PROCEDURE — 80053 COMPREHEN METABOLIC PANEL: CPT | Performed by: FAMILY MEDICINE

## 2020-02-11 PROCEDURE — 99220 PR INITIAL OBSERVATION CARE/DAY 70 MINUTES: CPT | Performed by: FAMILY MEDICINE

## 2020-02-11 PROCEDURE — 97163 PT EVAL HIGH COMPLEX 45 MIN: CPT

## 2020-02-11 PROCEDURE — 97166 OT EVAL MOD COMPLEX 45 MIN: CPT

## 2020-02-11 PROCEDURE — 76770 US EXAM ABDO BACK WALL COMP: CPT

## 2020-02-11 PROCEDURE — 82948 REAGENT STRIP/BLOOD GLUCOSE: CPT

## 2020-02-11 PROCEDURE — 99215 OFFICE O/P EST HI 40 MIN: CPT | Performed by: PHYSICIAN ASSISTANT

## 2020-02-11 PROCEDURE — NC001 PR NO CHARGE: Performed by: FAMILY MEDICINE

## 2020-02-11 PROCEDURE — 85025 COMPLETE CBC W/AUTO DIFF WBC: CPT | Performed by: FAMILY MEDICINE

## 2020-02-11 PROCEDURE — 84100 ASSAY OF PHOSPHORUS: CPT | Performed by: FAMILY MEDICINE

## 2020-02-11 RX ORDER — DILTIAZEM HYDROCHLORIDE 240 MG/1
240 CAPSULE, COATED, EXTENDED RELEASE ORAL DAILY
Status: DISCONTINUED | OUTPATIENT
Start: 2020-02-11 | End: 2020-02-11

## 2020-02-11 RX ORDER — DILTIAZEM HYDROCHLORIDE 240 MG/1
480 CAPSULE, COATED, EXTENDED RELEASE ORAL DAILY
Status: DISCONTINUED | OUTPATIENT
Start: 2020-02-12 | End: 2020-02-12 | Stop reason: HOSPADM

## 2020-02-11 RX ORDER — INSULIN GLARGINE 100 [IU]/ML
45 INJECTION, SOLUTION SUBCUTANEOUS
Status: DISCONTINUED | OUTPATIENT
Start: 2020-02-11 | End: 2020-02-12 | Stop reason: HOSPADM

## 2020-02-11 RX ORDER — DILTIAZEM HYDROCHLORIDE 240 MG/1
240 CAPSULE, COATED, EXTENDED RELEASE ORAL ONCE
Status: COMPLETED | OUTPATIENT
Start: 2020-02-11 | End: 2020-02-11

## 2020-02-11 RX ORDER — DILTIAZEM HYDROCHLORIDE 240 MG/1
480 CAPSULE, COATED, EXTENDED RELEASE ORAL DAILY
Qty: 60 CAPSULE | Refills: 1 | Status: SHIPPED | OUTPATIENT
Start: 2020-02-11 | End: 2020-02-12 | Stop reason: HOSPADM

## 2020-02-11 RX ORDER — LABETALOL 20 MG/4 ML (5 MG/ML) INTRAVENOUS SYRINGE
10 ONCE
Status: DISCONTINUED | OUTPATIENT
Start: 2020-02-11 | End: 2020-02-12 | Stop reason: HOSPADM

## 2020-02-11 RX ORDER — SODIUM CHLORIDE 9 MG/ML
125 INJECTION, SOLUTION INTRAVENOUS CONTINUOUS
Status: DISCONTINUED | OUTPATIENT
Start: 2020-02-11 | End: 2020-02-12

## 2020-02-11 RX ORDER — INSULIN GLARGINE 100 [IU]/ML
45 INJECTION, SOLUTION SUBCUTANEOUS EVERY MORNING
Status: DISCONTINUED | OUTPATIENT
Start: 2020-02-12 | End: 2020-02-12 | Stop reason: HOSPADM

## 2020-02-11 RX ADMIN — INSULIN HUMAN 15 UNITS: 100 INJECTION, SOLUTION PARENTERAL at 22:37

## 2020-02-11 RX ADMIN — SODIUM CHLORIDE 1000 ML: 0.9 INJECTION, SOLUTION INTRAVENOUS at 21:51

## 2020-02-11 RX ADMIN — ACETAMINOPHEN 650 MG: 325 TABLET ORAL at 11:52

## 2020-02-11 RX ADMIN — ATORVASTATIN CALCIUM 40 MG: 40 TABLET, FILM COATED ORAL at 07:35

## 2020-02-11 RX ADMIN — PREDNISONE 10 MG: 10 TABLET ORAL at 07:38

## 2020-02-11 RX ADMIN — GABAPENTIN 100 MG: 100 CAPSULE ORAL at 07:35

## 2020-02-11 RX ADMIN — DILTIAZEM HYDROCHLORIDE 240 MG: 240 CAPSULE, COATED, EXTENDED RELEASE ORAL at 15:26

## 2020-02-11 RX ADMIN — INSULIN LISPRO 20 UNITS: 100 INJECTION, SOLUTION INTRAVENOUS; SUBCUTANEOUS at 21:51

## 2020-02-11 RX ADMIN — INSULIN GLARGINE 45 UNITS: 100 INJECTION, SOLUTION SUBCUTANEOUS at 21:55

## 2020-02-11 RX ADMIN — INSULIN LISPRO 25 UNITS: 100 INJECTION, SOLUTION INTRAVENOUS; SUBCUTANEOUS at 17:05

## 2020-02-11 RX ADMIN — INSULIN LISPRO 10 UNITS: 100 INJECTION, SOLUTION INTRAVENOUS; SUBCUTANEOUS at 11:54

## 2020-02-11 RX ADMIN — INSULIN LISPRO 12 UNITS: 100 INJECTION, SOLUTION INTRAVENOUS; SUBCUTANEOUS at 17:04

## 2020-02-11 RX ADMIN — HYDROCHLOROTHIAZIDE 25 MG: 25 TABLET ORAL at 07:36

## 2020-02-11 RX ADMIN — BUDESONIDE AND FORMOTEROL FUMARATE DIHYDRATE 2 PUFF: 80; 4.5 AEROSOL RESPIRATORY (INHALATION) at 08:52

## 2020-02-11 RX ADMIN — INSULIN LISPRO 8 UNITS: 100 INJECTION, SOLUTION INTRAVENOUS; SUBCUTANEOUS at 07:39

## 2020-02-11 RX ADMIN — LOSARTAN POTASSIUM 100 MG: 50 TABLET, FILM COATED ORAL at 07:35

## 2020-02-11 RX ADMIN — ACETAMINOPHEN 650 MG: 325 TABLET ORAL at 05:34

## 2020-02-11 RX ADMIN — DILTIAZEM HYDROCHLORIDE 240 MG: 240 CAPSULE, COATED, EXTENDED RELEASE ORAL at 11:52

## 2020-02-11 NOTE — PROGRESS NOTES
Pt getting dressed to go home with assistance of her spouse  Pt's spouse then called out to staff that pt was c/o dizziness, pt's R lip was curling and her R 5th finger was unable to move  Dr Sherryll Essex at bedside to evaluate pt  Per Dr Sherryll Essex, no need to Stroke Alert pt at this time  [facility] en route

## 2020-02-11 NOTE — OCCUPATIONAL THERAPY NOTE
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, with long-term current use of insulin ([facility] )    Hypertensive urgency    Hyperlipidemia    Class 3 severe obesity with serious comorbidity and body mass index (BMI) of 45 0 to 49 9 in adult [facility])    Visual impairment in both eyes    PMR (polymyalgia rheumatica) ([facility] )    Past Medical History  Past Medical History:   Diagnosis Date    Anemia     Arthritis     Dyspnea on exertion     Hyperlipidemia     Hypertension     Legally blind 2010    Mild intermittent asthma with exacerbation 8/4/2018    Miscarriage     x 3    Seizures (HCC)     Sickle cell trait ([place] [address] )     Sleep apnea      Past Surgical History  Past Surgical History:   Procedure Laterality Date    CATARACT EXTRACTION Bilateral     august and september    EYE SURGERY      HYSTERECTOMY      39    OOPHORECTOMY Left     39    SD TEMPORAL ARTERY LIGATN OR BX Bilateral 10/17/2019    Procedure: BIOPSY ARTERY TEMPORAL;  Surgeon: Antoine Bae DO;  [facility];  Service: Vascular         02/11/20 0948   Note Type   Note type Eval only   Restrictions/Precautions   Weight Bearing Precautions Per Order No   Other Precautions Fall Risk;Pain   Pain Assessment   Pain Assessment 0-10   Pain Score 3   Pain Type Acute pain   Pain Location Head   Patient's Stated Pain Goal No pain   Hospital Pain Intervention(s) Repositioned; Ambulation/increased activity; Distraction; Emotional support   Response to Interventions TOLERATED    Home Living   Type of [address] [place] [address] to live on main level with bedroom/bathroom;Stairs to enter with rails  (6+6 JESSICA )   Bathroom Shower/Tub Tub/shower unit   Bathroom Toilet Standard   Bathroom Equipment Shower chair;Commode   Bathroom Accessibility Accessible Home Equipment Cane;Walker   Additional Comments PT REPORTS USE OF SPC PTA  NO USE OF BATHROOM EQUIPMENT    Prior Function   Level of [place] Independent with ADLs and functional mobility   Lives With Spouse   Receives Help From Family   ADL Assistance Independent   IADLs Independent   Falls in the last 6 months 0   Vocational Retired   Lifestyle   Autonomy  [facility] ADLS/LT IADLS PTA  HAS ASSIST WITH HEAVY IADLS  -   Reciprocal Relationships LIVES WITH SPOUSE  PT REPORTS SPOUSE CAN PROVIDE SOME ASSIST HOWEVER IS AN AMPUTEE    Service to Others RETIRED   Intrinsic Gratification ENJOYS GOING FOR WALKS    Psychosocial   Psychosocial (WDL) WDL   ADL   Eating Assistance 7  Independent   Grooming Assistance 7  Independent   UB Bathing Assistance 5  Supervision/Setup   LB Bathing Assistance 5  Supervision/Setup   UB Dressing Assistance 5  Supervision/Setup   LB Dressing Assistance 5  Supervision/Setup   Toileting Assistance  5  Supervision/Setup   Functional Assistance 5  Supervision/Setup   Transfers   Sit to Stand 5  Supervision   Additional items Assist x 1; Increased time required   Stand to Sit 5  Supervision   Additional items Assist x 1; Increased time required   Functional Mobility   Functional Mobility 5  Supervision   Additional items [facility]   Balance   Static Sitting Good   Static Standing Fair   Ambulatory Fair -   Activity Tolerance   Activity Tolerance Patient tolerated treatment well;Patient limited by fatigue   Nurse Made Aware APPROPRIATE TO SEE PER RN    RUE Assessment   RUE Assessment WFL   LUE Assessment   LUE Assessment WFL   Hand Function   Gross Motor Coordination Functional   Fine Motor Coordination Functional   Sensation   Light Touch No apparent deficits   Vision - Complex Assessment   Additional Comments VISUALLY IMPAIRED AT BASELINE 2' DM    Cognition   Overall Cognitive Status WFL   Arousal/Participation Alert; Cooperative   Attention Within functional limits   Orientation Level Oriented X4   Memory Within functional limits   Following Commands Follows multistep commands without difficulty   Comments PT IS PLEASANT AND COOPERATIVE   Assessment   Assessment 61 YO Female SEEN FOR INITIAL OCCUPATIONAL THERAPY EVALUATION FOLLOWING ADMISSION TO [facility] WITH HA, DIZZINESS AND UNSTEADY GAIT  PT FOUND AS OP TO HAVE L CAVERNOUS CAROTID ANEURYSM X2 AND HYPERTENSIVE URGENCY  PROBLEMS LIST INCLUDES DM, LEGALLY BLIND, POLYMYALGIA RHEUMATICA, SEIZURES, HLD, AND THROMBOCYTOSIS  PT IS FROM AN APT WITH SPOUSE WHERE SHE REPORTS BEING INDEPENDENT WITH ADLS/LT IADLS PTA  PT DOES NOT DRIVE 2' VISUAL IMPAIRMENT, REPORTS WALKING SEVERAL BLOCKS TO RUN ERRANDS AND FOR LEISURE  PT CURRENTLY REQUIRES OVERALL SUPERVISION WITH ADLS, TRANSFERS AND FUNCTIONAL MOBILITY WITH USE OF SPC  PT IS EXPERIENCING EXPECTED LIMITATIONS 2' PAIN, FATIGUE, IMPAIRED BALANCE, FALL RISK , OVERALL WEAKNESS/DECONDITIONING , VISUAL DEFICITS, INACCESSIBLE HOME ENVIRONMENT and OVERALL LIMITED ACTIVITY TOLERANCE  PT EDUCATED ON DEEP BREATHING TECHNIQUES T/O ACTIVITY, SLOWING OF PACE, ENERGY CONSERVATION TECHNIQUES FOR CARRY OVER UPON D/C, INCREASED FAMILY SUPPORT and CONTINUE PARTICIPATION IN SELF-CARE/MOBILITY WITH STAFF [facility]   FROM AN OCCUPATIONAL THERAPY PERSPECTIVE, PT CAN RETURN HOME WITH INCREASED FAMILY SUPPORT WHEN MEDICALLY CLEARED  DME RECS INCLUDE USE OF BSC OVER TOILET, USE OF SC AND AGREE WITH USE OF SPC  ALL QUESTIONS/CONCERNS ADDRESSED  NO ADDITIONAL CARE OT NEEDS  D/C OT      Goals   Patient Goals TO WALK FURTHER    Recommendation   OT Discharge Recommendation Home with family support   Equipment Recommended   (USE OF BSC, SC AND SPC )   OT - OK to Discharge Yes   Modified Bailey Scale   Modified Bailey Scale 3       Documentation completed by Nati Callaway, DESMOND, OTR/L

## 2020-02-11 NOTE — PLAN OF CARE
Problem: CARDIOVASCULAR - ADULT  Goal: Maintains optimal cardiac output and hemodynamic stability  Description  INTERVENTIONS:  - Monitor I/O, vital signs and rhythm  - Monitor for S/S and trends of decreased cardiac output  - Administer and titrate ordered vasoactive medications to optimize hemodynamic stability  - Assess quality of pulses, skin color and temperature  - Assess for signs of decreased coronary artery perfusion  - Instruct patient to report change in severity of symptoms  Outcome: Progressing

## 2020-02-11 NOTE — ED NOTES
Called floor to give report  Nurse unable to answer at this time   Will try again in 5-10 minutes     [address], RN  02/10/20 9970

## 2020-02-11 NOTE — PLAN OF CARE
Problem: Potential for Falls  Goal: Patient will remain free of falls  Description  INTERVENTIONS:  - Assess patient frequently for physical needs  -  Identify cognitive and physical deficits and behaviors that affect risk of falls    -  [place] fall precautions as indicated by assessment   - Educate patient/family on patient safety including physical limitations  - Instruct patient to call for assistance with activity based on assessment  - Modify environment to reduce risk of injury  - Consider OT/PT consult to assist with strengthening/mobility  Outcome: Progressing     Problem: PAIN - ADULT  Goal: Verbalizes/displays adequate comfort level or baseline comfort level  Description  Interventions:  - Encourage patient to monitor pain and request assistance  - Assess pain using appropriate pain scale  - Administer analgesics based on type and severity of pain and evaluate response  - Implement non-pharmacological measures as appropriate and evaluate response  - Consider cultural and social influences on pain and pain management  - Notify physician/advanced practitioner if interventions unsuccessful or patient reports new pain  Outcome: Progressing     Problem: INFECTION - ADULT  Goal: Absence or prevention of progression during hospitalization  Description  INTERVENTIONS:  - Assess and monitor for signs and symptoms of infection  - Monitor lab/diagnostic results  - Monitor all insertion sites, i e  indwelling lines, tubes, and drains  - [place] appropriate cooling/warming therapies per order  - Administer medications as ordered  - Instruct and encourage patient and family to use good hand hygiene technique  - Identify and instruct in appropriate isolation precautions for identified infection/condition   Outcome: Progressing  Goal: Absence of fever/infection during neutropenic period  Description  INTERVENTIONS:  - Monitor WBC    Outcome: Progressing     Problem: SAFETY ADULT  Goal: Maintain or return to baseline ADL function  Description  INTERVENTIONS:  -  Assess patient's ability to carry out ADLs; assess patient's baseline for ADL function and identify physical deficits which impact ability to perform ADLs (bathing, care of mouth/teeth, toileting, grooming, dressing, etc )  - Assess/evaluate cause of self-care deficits   - Assess range of motion  - Assess patient's mobility; develop plan if impaired  - Assess patient's need for assistive devices and provide as appropriate  - Encourage maximum independence but intervene and supervise when necessary  - Involve family in performance of ADLs  - Assess for home care needs following discharge   - Consider OT consult to assist with ADL evaluation and planning for discharge  - Provide patient education as appropriate  Outcome: Progressing  Goal: Maintain or return mobility status to optimal level  Description  INTERVENTIONS:  - Assess patient's baseline mobility status (ambulation, transfers, stairs, etc )    - Identify cognitive and physical deficits and behaviors that affect mobility  - Identify mobility aids required to assist with transfers and/or ambulation (gait belt, sit-to-stand, lift, walker, cane, etc )  - [place] fall precautions as indicated by assessment  - Record patient progress and toleration of activity level on Mobility SBAR; progress patient to next Phase/Stage  - Instruct patient to call for assistance with activity based on assessment  - Consider rehabilitation consult to assist with strengthening/weightbearing, etc   Outcome: Progressing     Problem: DISCHARGE PLANNING  Goal: Discharge to home or other facility with appropriate resources  Description  INTERVENTIONS:  - Identify barriers to discharge w/patient and caregiver  - Arrange for needed discharge resources and transportation as appropriate  - Identify discharge learning needs (meds, wound care, etc )  - Arrange for interpretive services to assist at discharge as needed  - Refer to Case Management Department for coordinating discharge planning if the patient needs post-hospital services based on physician/advanced practitioner order or complex needs related to functional status, cognitive ability, or social support system  Outcome: Progressing     Problem: Knowledge Deficit  Goal: Patient/family/caregiver demonstrates understanding of disease process, treatment plan, medications, and discharge instructions  Description  Complete learning assessment and assess knowledge base  Interventions:  - Provide teaching at level of understanding  - Provide teaching via preferred learning methods  Outcome: Progressing     Problem: NEUROSENSORY - ADULT  Goal: Achieves stable or improved neurological status  Description  INTERVENTIONS  - Monitor and report changes in neurological status  - Monitor vital signs such as temperature, blood pressure, glucose, and any other labs ordered   - Monitor for seizure activity and implement precautions if appropriate       Outcome: Progressing  Goal: Achieves maximal functionality and self care  Description  INTERVENTIONS  - Monitor swallowing and airway patency with patient fatigue and changes in neurological status  - Encourage and assist patient to increase activity and self care     - Encourage visually impaired, hearing impaired and aphasic patients to use assistive/communication devices  Outcome: Progressing

## 2020-02-11 NOTE — ASSESSMENT & PLAN NOTE
BP today 169/93  Patient not taking PTA meds besides Losartan 100 mg  Started regiment of Losartan 100 mg and Cardizem 240 mg  - If blood pressures continue to be elevated from baseline, will increase dose of Cardizem    Elevated BP to 201/99 in the office, 230s/110s in ED, improved with labetalol 20 mg in ED, trending down to 150s/80s  Associated with headache, no changes in vision, chest pain, or SOB  Random BPs at office visits 120/70, 140/70 in 1/2020  Home regimen includes amlodipine 10 mg QD, Losartan 100 mg QD, Lopressor 25 mg QD, Cardizem 420 mg QD, HCTZ 25 mg QD  Compliant, but ran out of cardizem the last 2 weeks  This elevated BP episode could be from chronic systemic steroids use for polymyalgia rheumatica started 11/6/2020, currently on Prednisone 10 mg QD  Will continue PTA meds, monitor BP closely

## 2020-02-11 NOTE — SOCIAL WORK
Met with patient an SO to discuss CM role and dcp  Pt lives w/ SO in apt w/[address]  Stated that she will be looking for apt with easier acces  Indpendent ADL's and ambulation w/ cane pta  No longer drives  Takes public transportation  Works [employer] is [facility] in [facility] but will be changing to a closer pharmacy  DME: Adal Bravo  Pt has had [facility] in past   No prior inpt rehab  No LW/POA  Pt states doctor told her we will arrange Lyft home  Lyft approved by CM supervisor  Pt signed release and SLESANDI called to arrange Lyft for 1830 today  Release for Lyft signed by patient  Pt started on Cardizem  Prescription sent to [address] [place] [address] [place]  Cost is $40 00  Pt is agreeble to same  Attending made aware  SO will  from [facility]

## 2020-02-11 NOTE — ASSESSMENT & PLAN NOTE
Lab Results   Component Value Date    HGBA1C 9 3 (H) 12/06/2019       Recent Labs     02/10/20  1716 02/10/20  2138 02/11/20  0639   POCGLU 472* 379* 300*       Blood Sugar Average: Last 72 hrs:  (P) 511 [id number]   Home regimen Humalog 25U before each meal, and basaglar 40U bid  Will start Lantus 40U HS and SSI #4  Monitor BG closely

## 2020-02-11 NOTE — UTILIZATION REVIEW
Initial Clinical Review    Admission: Date/Time/Statement: Admission Orders (From admission, onward)     Ordered        02/10/20 1433  Place in Observation  Once                   Orders Placed This Encounter   Procedures    Place in Observation     Standing Status:   Standing     Number of Occurrences:   1     Order Specific Question:   Admitting Physician     Answer:   Lucero San [[id number]]     Order Specific Question:   Level of Care     Answer:   Med Surg [16]     ED Arrival Information     Expected Arrival Acuity Means of Arrival Escorted By Service Admission Type    - 2/10/2020 10:48 Emergent Ambulance [facility]ont[facility] 994 Emergency    Arrival Complaint    -        Chief Complaint   Patient presents with    Hypertension     coming from Neurology (dx: with bleeds), found to be hypertensive  Recently dx "with aneurysms " Pt has been taken off Diltiazem d/t inability to refill meds  +lightheaded     Assessment/Plan:    62year old female presents to ed from outpatient neurology clinic for evaluation and treatment of hypertension and headache  PMHX:  Chronic headaches, diabetes, hypertension, seizures, polymyalgia rheumatica on chronic steroids    On arrival patient reports she was at the neurology clinic for her headaches and was found to be severely hypertensive and she ran out of cardizem 2 weeks ago  Physical examination shows systolic bp> 772 and diastolic MQ>110   Headache described as throbbing  5/10  Wbc 10 93  NO acute finding on CTA head and neck except left cavernous carotid aneurysm  Ekg without significant finding  Treated in ed with iv labetalol, iv reglan ,iv  9% ns 1L bolus, iv depacon, iv mag so4, metoprolol po, prednisone po, tylenol po , sq insulin  Admit to observation for left cavernous carotid aneurysm  Plan includes consult neuro surgery,  Hold chemical DVT prophylaxis due to high risk rupture aneurysm, neuro checks, PT/OT evaluations      Addendum PT evaluation completed and recommend outpatient therapy  ED Triage Vitals   02/10/20 1100 02/10/20 1052 02/10/20 1052 02/10/20 1052 02/10/20 1052   98 °F (36 7 °C) 97 19 (!) 239/115 99 %      Oral Monitor         No Pain       02/10/20 134 kg (296 lb 4 8 oz)     Additional Vital Signs:    Date/Time  Temp  Pulse  Resp  BP  MAP (mmHg)  SpO2  O2 Device   02/11/20 07:33:47  98 1 °F (36 7 °C)  77  19  169/93  118  96 %  --   02/10/20 21:42:14  98 2 °F (36 8 °C)  79  19  164/90  115  97 %  --   02/1960  --  79  18  165/88  --  97 %  None (Room air)   02/10/20 1900  --  84  18  167/83  118  95 %  None (Room air)   02/10/20 1830  --  84  20  161/85  116  96 %  None (Room air)   02/10/20 1730  --  89  21  164/83  --  97 %  None (Room air)   02/10/20 1630  --  86  19  155/79  --  96 %  None (Room air)   02/10/20 1600  --  84  17  154/79  109  97 %  None (Room air)   02/10/20 1545  --  84  20  --  --  97 %  --   02/10/20 1530  --  82  19  154/83  113  97 %  None (Room air)   02/10/20 1507  --  --  18  --  --  --  --   02/10/20 1506  --  85  23  Abnormal   160/87  --  97 %  None (Room air)   02/10/20 1430  --  --  --  --  --  --  None (Room air)   02/10/20 1403  --  86  20  152/70  --  94 %  None (Room air)   02/10/20 1300  --  85  20  174/82  Abnormal   --  98 %  --   02/10/20 1215  --  80  20  179/87  Abnormal   125  99 %  None (Room air)   02/10/20 1130  --  96  18  209/105  Abnormal   150  100 %  None (Room air)   02/10/20 1105  --  98  17  236/113  Abnormal   --  98 %  None (Room air)             Pertinent Labs/Diagnostic Test Results:     Collection Time Result Time Vent R Atrial R OH Int  QRSD Int  QT Int  QTC Int   P Axis QRS Axis T Wave Ax    02/10/20 11:52:27 02/10/20 18:59:09 81 81 168 96 396 460 61 -2 44           Normal sinus rhythm  Minimal voltage criteria for LVH, may be normal variant  Borderline ECG  When compared with ECG of 17-SEP-2019 05:36,  No significant change was found      CTA head and neck with and without contrast      Final  (02/10 1327)      No acute intracranial hemorrhage  Mild cerebral chronic microangiopathic disease, presumably secondary to hypertension  Stable 3 mm left cavernous ICA aneurysms when comparing to the prior study of September 23, 2019  Generalized parenchymal volume loss, accelerated for age, somewhat temporal and frontal predominant  Correlate for underlying neurodegenerative disease or clinical signs and symptoms of NPH  Consider follow-up MR NeuroQuant imaging  3 mm extradural and cavernous cave left ICA aneurysms  Neurosurgical consultation is recommended           US retroperitoneal complete    (Results Pending)     Results from last 7 days   Lab Units 02/10/20  1142   WBC Thousand/uL 10 93*   HEMOGLOBIN g/dL 11 2*   HEMATOCRIT % 37 6   PLATELETS Thousands/uL 378   NEUTROS ABS Thousands/µL 7 74*         Results from last 7 days   Lab Units 02/10/20  1142   SODIUM mmol/L 139   POTASSIUM mmol/L 3 5   CHLORIDE mmol/L 102   CO2 mmol/L 30   ANION GAP mmol/L 7   BUN mg/dL 14   CREATININE mg/dL 0 91   EGFR ml/min/1 73sq m 79   CALCIUM mg/dL 9 5     Results from last 7 days   Lab Units 02/10/20  1142   AST U/L 12   ALT U/L 19   ALK PHOS U/L 152*   TOTAL PROTEIN g/dL 7 8   ALBUMIN g/dL 3 0*   TOTAL BILIRUBIN mg/dL 0 28     Results from last 7 days   Lab Units 02/11/20  0639 02/10/20  2138 02/10/20  1716   POC GLUCOSE mg/dl 300* 379* 472*     Results from last 7 days   Lab Units 02/10/20  1142   GLUCOSE RANDOM mg/dL 261*         Results from last 7 days   Lab Units 02/10/20  2030 02/10/20  1718 02/10/20  1142   TROPONIN I ng/mL 0 02 0 02 <0 02           ED Treatment:   Medication Administration from 02/10/2020 1014 to 02/10/2020 2123       Date/Time Order Dose Route Action     02/10/2020 1142 Labetalol HCl (NORMODYNE) injection 20 mg 20 mg Intravenous Given     02/10/2020 1245 acetaminophen (TYLENOL) tablet 975 mg 975 mg Oral Given     02/10/2020 1245 metoclopramide (REGLAN) injection 10 mg 10 mg Intravenous Given     02/10/2020 1244 sodium chloride 0 9 % bolus 1,000 mL 1,000 mL Intravenous New Bag     02/10/2020 1405 valproate (DEPACON) 1,000 mg in sodium chloride 0 9 % 50 mL BOLUS 1,000 mg Intravenous New Bag     02/10/2020 1336 magnesium sulfate 2 g/50 mL IVPB (premix) 2 g 2 g Intravenous New Bag     02/10/2020 1721 atorvastatin (LIPITOR) tablet 40 mg 40 mg Oral Given     02/10/2020 1721 gabapentin (NEURONTIN) capsule 100 mg 100 mg Oral Given     02/10/2020 1719 insulin lispro (HumaLOG) 100 units/mL subcutaneous injection 2-12 Units 12 Units Subcutaneous Given        Past Medical History:   Diagnosis Date    Anemia     Arthritis     Dyspnea on exertion     Hyperlipidemia     Hypertension     Legally blind 2010    Mild intermittent asthma with exacerbation 8/4/2018    Miscarriage     x 3    Seizures ([facility])     Sickle cell trait ([facility] )     Sleep apnea      Present on Admission:   Hypertensive urgency   Hyperlipidemia   Visual impairment in both eyes   PMR (polymyalgia rheumatica) ([facility])   Left cavernous carotid aneurysm      Admitting Diagnosis:   Aneurysm ([place] [address] ) [I72 9]  Hypertension [I10]  Uncontrolled hypertension [I10]    Age/Sex: 62 y o  female     Admission Orders:    Scheduled Medications:    Medications:  acetaminophen 650 mg Oral Q6H [address]   atorvastatin 40 mg Oral Daily   budesonide-formoterol 2 puff Inhalation Daily   gabapentin 100 mg Oral BID   hydrochlorothiazide 25 mg Oral Daily   insulin glargine 40 Units Subcutaneous HS   insulin lispro 2-12 Units Subcutaneous TID AC   losartan 100 mg Oral Daily   predniSONE 10 mg Oral Daily     Continuous IV Infusions:     PRN Meds:    albuterol 2 puff Inhalation Q6H PRN       IP CONSULT TO NEUROSURGERY    Network Utilization Review Department  [email or web address]  ATTENTION: Please call with any questions or concerns to [phone] and carefully listen to the prompts so that you are directed to the right person  All voicemails are confidential   Anita Booker all requests for admission clinical reviews, approved or denied determinations and any other requests to dedicated fax number below belonging to the campus where the patient is receiving treatment   List of dedicated fax numbers for the Facilities:  [address] DENIALS (Administrative/Medical Necessity) [phone]   1000  [facility] (Maternity/NICU/Pediatrics) [phone]   Malou Ayoubr [phone]   Miguel Nion [phone]   Thong Graves [phone]   [facility] [phone]   [address] [place] [address] [phone]   [facility]  [phone]   [address] [place] [address], [address]  2401 [facility] 1000 [address] [facility] [phone]

## 2020-02-11 NOTE — PHYSICAL THERAPY NOTE
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, with long-term current use of insulin ([facility] )    Hypertensive urgency    Seizures ([facility] )    Exertional dyspnea    Enlarged pulmonary artery (HCC)    Aortic dilatation (HCC)    Anemia    Decreased pulses in feet    Diabetes mellitus type 2 with complications, uncontrolled ([facility] )    Hyperlipidemia    Microalbuminuria    Mild obstructive sleep apnea    Class 3 severe obesity with serious comorbidity and body mass index (BMI) of 45 0 to 49 9 in [facility])    Peripheral neuropathy    Prolonged QT interval    Visual impairment in both eyes    Vitamin D deficiency    Lung nodules    Orthostatic hypotension    Mild intermittent asthma with exacerbation    Type 2 diabetes mellitus with microalbuminuria, with long-term current use of insulin ([facility])    Frequent headaches    Other inflammatory and immune myopathies, not elsewhere classified    Transaminitis    Morbid obesity ([facility] )    Polyneuropathy associated with underlying disease ([facility] )    PMR (polymyalgia rheumatica) ([facility] )    Thrombocytosis ([facility] )    Left cavernous carotid aneurysm       Past Medical History  Past Medical History:   Diagnosis Date    Anemia     Arthritis     Dyspnea on exertion     Hyperlipidemia     Hypertension     Legally blind 2010    Mild intermittent asthma with exacerbation 8/4/2018    Miscarriage     x 3    Seizures (HCC)     Sickle cell trait ([facility] )     Sleep apnea        Past Surgical History  Past Surgical History:   Procedure Laterality Date    CATARACT EXTRACTION Bilateral     august and september    EYE SURGERY      HYSTERECTOMY      39    OOPHORECTOMY Left     39    DC TEMPORAL ARTERY LIGATN OR BX Bilateral 10/17/2019 Procedure: BIOPSY ARTERY TEMPORAL;  Surgeon: Dominique Raza DO;  Location: [facility];  Service: Vascular        02/11/20 2910   Note Type   Note type Eval only   Pain Assessment   Pain Assessment 0-10   Pain Score 3   Pain Type Acute pain;Chronic pain   Pain Location Head   Pain Orientation Bilateral   Patient's Stated Pain Goal No pain   Hospital Pain Intervention(s) Ambulation/increased activity   Response to Interventions unchanged   Home Living   Type of Home Apartment   Additional Comments Resides w/  in apt, 6+6 [address]  Indep self care, ambulates w/ cane    does not drive   walks several blocks to Dr Stacie Blair   Prior Function   Level of [place] Independent with ADLs and functional mobility   Falls in the last 6 months 0  (several near falls)   Restrictions/Precautions   Weight Bearing Precautions Per Order No   Other Precautions Fall Risk;Pain   General   Family/Caregiver Present No   Cognition   Overall Cognitive Status WFL   Arousal/Participation Responsive   Orientation Level Oriented X4   Memory Unable to assess   Following Commands Follows one step commands without difficulty   RLE Assessment   RLE Assessment   (strength grossly 4/5)   LLE Assessment   LLE Assessment   (strength grossly 4/5)   Coordination   Movements are Fluid and Coordinated 0   Coordination and Movement Description B LE ataxia   Transfers   Sit to Stand 5  Supervision   Additional items Assist x 1   Stand to Sit 5  Supervision   Additional items Assist x 1   Ambulation/Elevation   Gait pattern   (slow, ataxia, lateral sway, mild LOB)   Gait Assistance   (CGA)   Additional items Assist x 1   Assistive Device SPC   Distance 120'x2, standing rest x 3-4 min 1/2 way, as well as several shorter standing rests   Balance   Static Sitting Normal   Dynamic Sitting Good   Static Standing Fair   Dynamic Standing Fair -   Ambulatory Fair -   Endurance Deficit   Endurance Deficit Yes   Endurance Deficit Description weakness, fatigue, pain   Activity Tolerance   Activity Tolerance Patient limited by fatigue;Patient limited by pain;Treatment limited secondary to medical complications (Comment)   Nurse Made Aware yes   Assessment   Prognosis Good   Problem List Decreased strength;Decreased endurance; Impaired balance;Decreased mobility; Decreased coordination;Pain   Assessment Pt seen for high complexity physical therapy evaluation  Pt is a 63 y/o female w/ history/comorbidities of DM, legally blind, polymyalgia rheumatica, obesity who is now admitted w/ several day to week hx of HA, dizziness, unsteady gait  Found as OP to have L cavernous carotid aneurysm x 2, and noted to be in hypertensive urgency on admit  Due to acute medical issues, pain, fall risk, note unstable clinical picture  PT consulted to assess mobility, d/c needs  Pt presents w/ decreased functional mob, standing balance, endurance, B LE strength and coordination, barriers at home  will benefit from skilled PT to correct for the above problems  Recommend home when stable  spoke w/ pt and would recommend OP PT, however, she declines and states she will not go despite the recs   Goals   Patient Goals to walk   STG Expiration Date 02/25/20   Short Term Goal #1 1-2 wks: bed mob and transfers w/ indep, standing balance to good/normal w/ device, ambulate 200-300 ft w/ cane and mod I, increase B LE strength by 1/2 -1 grade, ambulate 6-7 stairs w/ S   PT Treatment Day 0   Plan   Treatment/Interventions Functional transfer training;Elevations;LE strengthening/ROM; Therapeutic exercise; Endurance training;Patient/family training;Equipment eval/education; Bed mobility;Gait training   PT Frequency Other (Comment)  (3-5x/wk)   Recommendation   Recommendation Outpatient PT  (home w/ OP PT if agreeable)   PT - OK to Discharge Yes  (when stable, see above for recs)   Modified [place] Scale   Modified [place] Scale 4   Barthel Index   Feeding 10   Bathing 5   Grooming Score 5   Dressing Score 10 Bladder Score 10   Bowels Score 10   Toilet Use Score 10   Transfers (Bed/Chair) Score 10   Mobility (Level Surface) Score 10   Stairs Score 0   Barthel Index Score 80   Jonathan Alexander PT, DPT CSRS          Mariella Ramos PT, DPT, CSRS

## 2020-02-11 NOTE — PLAN OF CARE
Problem: PHYSICAL THERAPY ADULT  Goal: Performs mobility at highest level of function for planned discharge setting  See evaluation for individualized goals  Description  Treatment/Interventions: Functional transfer training, Elevations, LE strengthening/ROM, Therapeutic exercise, Endurance training, Patient/family training, Equipment eval/education, Bed mobility, Gait training          See flowsheet documentation for full assessment, interventions and recommendations  Note:   Prognosis: Good  Problem List: Decreased strength, Decreased endurance, Impaired balance, Decreased mobility, Decreased coordination, Pain  Assessment: Pt seen for high complexity physical therapy evaluation  Pt is a 61 y/o female w/ history/comorbidities of DM, legally blind, polymyalgia rheumatica, obesity who is now admitted w/ several day to week hx of HA, dizziness, unsteady gait  Found as OP to have L cavernous carotid aneurysm x 2, and noted to be in hypertensive urgency on admit  Due to acute medical issues, pain, fall risk, note unstable clinical picture  PT consulted to assess mobility, d/c needs  Pt presents w/ decreased functional mob, standing balance, endurance, B LE strength and coordination, barriers at home  will benefit from skilled PT to correct for the above problems  Recommend home when stable  spoke w/ pt and would recommend OP PT, however, she declines and states she will not go despite the recs        Recommendation: Outpatient PT(home w/ OP PT if agreeable)     PT - OK to Discharge: Yes(when stable, see above for recs)    See flowsheet documentation for full assessment

## 2020-02-11 NOTE — PLAN OF CARE
Problem: Potential for Falls  Goal: Patient will remain free of falls  Description  INTERVENTIONS:  - Assess patient frequently for physical needs  -  Identify cognitive and physical deficits and behaviors that affect risk of falls    -  [place] fall precautions as indicated by assessment   - Educate patient/family on patient safety including physical limitations  - Instruct patient to call for assistance with activity based on assessment  - Modify environment to reduce risk of injury  - Consider OT/PT consult to assist with strengthening/mobility  Outcome: Progressing     Problem: PAIN - ADULT  Goal: Verbalizes/displays adequate comfort level or baseline comfort level  Description  Interventions:  - Encourage patient to monitor pain and request assistance  - Assess pain using appropriate pain scale  - Administer analgesics based on type and severity of pain and evaluate response  - Implement non-pharmacological measures as appropriate and evaluate response  - Consider cultural and social influences on pain and pain management  - Notify physician/advanced practitioner if interventions unsuccessful or patient reports new pain  Outcome: Progressing     Problem: INFECTION - ADULT  Goal: Absence or prevention of progression during hospitalization  Description  INTERVENTIONS:  - Assess and monitor for signs and symptoms of infection  - Monitor lab/diagnostic results  - Monitor all insertion sites, i e  indwelling lines, tubes, and drains  - Monitor endotracheal if appropriate and nasal secretions for changes in amount and color  - [place] appropriate cooling/warming therapies per order  - Administer medications as ordered  - Instruct and encourage patient and family to use good hand hygiene technique  - Identify and instruct in appropriate isolation precautions for identified infection/condition  Outcome: Progressing  Goal: Absence of fever/infection during neutropenic period  Description  INTERVENTIONS:  - Monitor WBC    Outcome: Progressing     Problem: SAFETY ADULT  Goal: Maintain or return to baseline ADL function  Description  INTERVENTIONS:  -  Assess patient's ability to carry out ADLs; assess patient's baseline for ADL function and identify physical deficits which impact ability to perform ADLs (bathing, care of mouth/teeth, toileting, grooming, dressing, etc )  - Assess/evaluate cause of self-care deficits   - Assess range of motion  - Assess patient's mobility; develop plan if impaired  - Assess patient's need for assistive devices and provide as appropriate  - Encourage maximum independence but intervene and supervise when necessary  - Involve family in performance of ADLs  - Assess for home care needs following discharge   - Consider OT consult to assist with ADL evaluation and planning for discharge  - Provide patient education as appropriate  Outcome: Progressing  Goal: Maintain or return mobility status to optimal level  Description  INTERVENTIONS:  - Assess patient's baseline mobility status (ambulation, transfers, stairs, etc )    - Identify cognitive and physical deficits and behaviors that affect mobility  - Identify mobility aids required to assist with transfers and/or ambulation (gait belt, sit-to-stand, lift, walker, cane, etc )  - [place] fall precautions as indicated by assessment  - Record patient progress and toleration of activity level on Mobility SBAR; progress patient to next Phase/Stage  - Instruct patient to call for assistance with activity based on assessment  - Consider rehabilitation consult to assist with strengthening/weightbearing, etc   Outcome: Progressing     Problem: DISCHARGE PLANNING  Goal: Discharge to home or other facility with appropriate resources  Description  INTERVENTIONS:  - Identify barriers to discharge w/patient and caregiver  - Arrange for needed discharge resources and transportation as appropriate  - Identify discharge learning needs (meds, wound care, etc )  - Arrange for interpretive services to assist at discharge as needed  - Refer to Case Management Department for coordinating discharge planning if the patient needs post-hospital services based on physician/advanced practitioner order or complex needs related to functional status, cognitive ability, or social support system  Outcome: Progressing     Problem: Knowledge Deficit  Goal: Patient/family/caregiver demonstrates understanding of disease process, treatment plan, medications, and discharge instructions  Description  Complete learning assessment and assess knowledge base    Interventions:  - Provide teaching at level of understanding  - Provide teaching via preferred learning methods  Outcome: Progressing

## 2020-02-11 NOTE — QUICK NOTE
Called pharmacy to clarify antihypertensive medications as several conflicting prior medications are listed- within past 3-4 months patient has only been prescribed losartan 100 mg daily, was prescribed diltiazem but never picked it up  Will continue losartan 100 mg daily, add hydrochlorothiazide 25 mg as will likely need at least 1 additional agent on discharge  Close outpatient follow-up with PCP  Of note, case also discussed with neurosurgery regarding blood pressure parameters in light of 3 mm L ICA aneurysm  No specific blood pressure parameters neurosurgically for aneurysm in cavernous sinus  Will continue to monitor closely   Further discussion/clarification antihypertensive regimen tomorrow per day team

## 2020-02-11 NOTE — ASSESSMENT & PLAN NOTE
H/o diabetic retinopathy  Closely monitor blood glucose  Continue to monitor BP to avoid acute vision disturbances

## 2020-02-11 NOTE — ASSESSMENT & PLAN NOTE
CTA head/neck showed stable 3 mm left cavernous ICA aneurysms when comparing to the prior study of September 23, 2019   -No focal neurological deficits on exam  Will appreciate neurovascular surgery recommendations    High risk of rupture will hold off on chemical DVT prophylaxis  Will admit to step-down level 2 for more frequent neuro checks and vital signs

## 2020-02-11 NOTE — CONSULTS
Consult Note - Mayda Jauregui 1960, 62 y o  female MRN: [id number]    Unit/Bed#: [facility] 729-01 Encounter: [id number]    Primary Care Provider: [id number] MELVA Beatty   Date and time admitted to hospital: 2/10/2020 10:48 AM        * Left cavernous carotid aneurysm  Assessment & Plan  Diagnosed in September 2019 on CTA completed for persistent vertigo    Imaging:  · CT head and neck with without September 2019:  Dilatation centered in the lateral aspect the mid to distal cavernous segment of the left ICA  · MRI head without September 2019:  Two small left cavernous carotid segment aneurysms proximal during 6 x 2 6 mm, distal 4 x 3 mm  No intradural aneurysm  Right A1 segment markedly hypoplastic  · CT head and neck with without 2/10/2020:  Laterally projecting extra 3 mm left cavernous ICA aneurysm  Left cavernous cave aneurysm measuring 3 mm obstruction of the cavernous and supraclinoid ICA  No acute intracranial hemorrhage    Plan:  · Monitor neurological exam   · Discussed natural history of aneurysm  · Discussed modifiable risk factors of growth and rupture to include control of hypertension, cholesterol and refrain from smoking  Patient is a non smoker  · Denies any family history of aneurysm or sudden death of unknown cause  · No urgent intervention  Will plan for outpatient follow-up  Hypertensive urgency  Assessment & Plan  Needs close monitoring   ongoing medical management  Follow-up outpatient with cardiology  History of Present Illness     HPI: Mayda Jauregui is a 62 y o  female with PMH including diabetes, hypertension, hyperlipidemia, sleep apnea, seizures who presented to the ER as advised by Neurology secondary to elevated blood pressure with systolic blood pressures greater than 200  Patient was being seen by Neurology for headaches  She states she developed headaches approximately one year ago without known provoking factors  She did states stressful time during work  Headaches have become progressive and daily  States generally bilateral frontal/temporal  States intermittent speech disturbance and visual 'fractured' light  States scleral have been injected for 1 year with varying degree but never resolves  Patient had history of persistent vertigo for she underwent a CTA of September 2019 demonstrating left ICA aneurysm  This was followed with MRI imaging  Patient is a nonsmoker  She denies any family history of aneurysm or any sudden death unknown etiology  She underwent repeat CTA yesterday in the setting of known aneurysm and hypertensive urgency  Repeat imaging demonstrated stable aneurysmal dilatation without acute intracranial abnormality  For these reasons, neurosurgical evaluation was requested  Review of Systems   Constitutional: Positive for fatigue  Negative for activity change and fever  HENT: Negative for trouble swallowing and voice change  Eyes: Positive for visual disturbance  Negative for photophobia  Respiratory: Negative for cough and shortness of breath  Cardiovascular: Negative for chest pain and leg swelling  Gastrointestinal: Negative for abdominal pain, nausea and vomiting  Genitourinary: Negative for decreased urine volume and difficulty urinating  Musculoskeletal: Positive for gait problem  Negative for back pain and neck pain  Skin: Negative for pallor, rash and wound  Neurological: Positive for dizziness, speech difficulty and headaches  Negative for tremors, seizures, weakness, light-headedness and numbness  Psychiatric/Behavioral: Negative for agitation and confusion         Historical Information   Past Medical History:   Diagnosis Date    Anemia     Arthritis     Dyspnea on exertion     Hyperlipidemia     Hypertension     Legally blind 2010    Mild intermittent asthma with exacerbation 8/4/2018    Miscarriage     x 3    Seizures ([facility])     [facility]ick[facility] cell trait ([facility] [address] )     Sleep apnea      Past Surgical History:   Procedure Laterality Date    CATARACT EXTRACTION Bilateral     august and september    EYE SURGERY      HYSTERECTOMY      39    OOPHORECTOMY Left     39    DE TEMPORAL ARTERY LIGATN OR BX Bilateral 10/17/2019    Procedure: BIOPSY ARTERY TEMPORAL;  Surgeon: Vernon Staff, DO;  Location: [facility];  Service: Vascular     Social History     Substance and Sexual Activity   Alcohol Use Never    Alcohol/week: 0 0 standard drinks    Frequency: Never    Drinks per session: Patient refused    Binge frequency: Never     Social History     Substance and Sexual Activity   Drug Use No     Social History     Tobacco Use   Smoking Status Never Smoker   Smokeless Tobacco Never Used     Family History   Problem Relation Age of Onset    Heart attack Mother     Heart disease Mother     Hypertension Mother     No Known Problems Father     Heart disease Sister     No Known Problems Brother     No Known Problems Son     No Known Problems Daughter     Heart attack Maternal Grandmother     Heart disease Maternal Grandmother     Diabetes Other     Heart attack Other     No Known Problems Sister     No Known Problems Sister     No Known Problems Paternal Aunt     No Known Problems Son     Cancer Neg Hx     Stroke Neg Hx        Meds/Allergies   all current active meds have been reviewed, current meds:   Current Facility-Administered Medications   Medication Dose Route Frequency    acetaminophen (TYLENOL) tablet 650 mg  650 mg Oral Q6H [facility] & halfway    albuterol (PROVENTIL HFA,VENTOLIN HFA) inhaler 2 puff  2 puff Inhalation Q6H PRN    atorvastatin (LIPITOR) tablet 40 mg  40 mg Oral Daily    budesonide-formoterol (SYMBICORT) 80-4 5 MCG/ACT inhaler 2 puff  2 puff Inhalation Daily    diltiazem (CARDIZEM CD) 24 hr capsule 240 mg  240 mg Oral Daily    gabapentin (NEURONTIN) capsule 100 mg  100 mg Oral BID    insulin glargine (LANTUS) subcutaneous injection 40 Units 0 4 mL  40 Units Subcutaneous HS    insulin lispro (HumaLOG) 100 units/mL subcutaneous injection 2-12 Units  2-12 Units Subcutaneous TID AC    insulin lispro (HumaLOG) 100 units/mL subcutaneous injection 25 Units  25 Units Subcutaneous TID With Meals    losartan (COZAAR) tablet 100 mg  100 mg Oral Daily    predniSONE tablet 10 mg  10 mg Oral Daily    and PTA meds:   Prior to Admission Medications   Prescriptions Last Dose Informant Patient Reported? Taking? BASAGLAR KWIKPEN 100 units/mL injection pen   No No   Si units in morning, 40 at night   SARAH MICROLET LANCETS lancets  Self Yes No   Sig: Inject 1 applicator into the skin 4 (four) times a day   Blood Glucose Monitoring Suppl (CONTOUR NEXT MONITOR) w/Device KIT  Self No No   Sig: Disp 1 meter dx E11 9, may submitted any contour next meter  If not covered let us know we can change strips   Blood Pressure Monitor KIT  Self No No   Sig: Check blood pressures BID   Patient not taking: Reported on 2/10/2020   CONTOUR NEXT TEST test strip  Self No No   Sig: Test blood sugar 3x per day dx E11 9   Cholecalciferol (VITAMIN D3) 3000 units TABS  Self Yes No   Sig: Take 3,000 Units by mouth daily    HUMALOG KWIKPEN 100 units/mL injection pen   No No   Sig: Take 25 units before meals   Insulin Pen Needle (BD PEN NEEDLE DERRICK U/F) 32G X 4 MM MISC  Self Yes No   Sig: Inject 1 applicator under the skin 4 (four) times a day   albuterol (PROVENTIL HFA) 90 mcg/act inhaler  Self No No   Sig: Inhale 2 puffs every 6 (six) hours as needed for wheezing For another 24 hours use every 4 hours standing   amLODIPine (NORVASC) 10 mg tablet   Yes No   Sig: Take 10 mg by mouth daily    aspirin (ADULT ASPIRIN EC LOW STRENGTH) 81 mg EC tablet   Yes No   Sig: Take 81 mg by mouth daily    atorvastatin (LIPITOR) 40 mg tablet   No No   Sig: TAKE 1 TABLET BY MOUTH EVERY DAY   budesonide-formoterol (SYMBICORT) 80-4 5 MCG/ACT inhaler  Self No No   Sig: Inhale 2 puffs 2 (two) times a day Rinse mouth after use     Patient taking differently: Inhale 2 puffs as needed Rinse mouth after use  diltiazem (TIAZAC) 420 MG 24 hr capsule  Self No No   Sig: Take 1 capsule (420 mg total) by mouth daily   Patient not taking: Reported on 2/10/2020   ferrous sulfate 324 (65 Fe) mg   No No   Sig: Take 1 tablet (324 mg total) by mouth every other day   gabapentin (NEURONTIN) 100 mg capsule  Self No No   Sig: Take 1 capsule in am and 2-3 capsules at night   hydrochlorothiazide (HYDRODIURIL) 25 mg tablet   Yes No   Sig: Take 25 mg by mouth daily    labetalol (NORMODYNE) 200 mg tablet   Yes No   Sig: Take 200 mg by mouth daily    lisinopril (ZESTRIL) 20 mg tablet   Yes No   Sig: Take 40 mg by mouth   losartan (COZAAR) 100 MG tablet  Self No No   Sig: TAKE 1 TABLET BY MOUTH EVERY DAY   metoprolol tartrate (LOPRESSOR) 25 mg tablet   Yes No   Sig: Take 12 5 mg by mouth   predniSONE 5 mg tablet  Self No No   Sig: Take 3 tablets (15 mg total) by mouth daily   Patient taking differently: Take 10 mg by mouth daily    simvastatin (ZOCOR) 40 mg tablet   Yes No   Sig: Take 40 mg by mouth daily at bedtime    valsartan-hydrochlorothiazide (DIOVAN-HCT) 320-12 5 MG per tablet   Yes No   Sig: Take 1 tablet by mouth daily       Facility-Administered Medications: None     No Known Allergies    Objective   I/O       02/09 0701 - 02/10 0700 02/10 0701 - 02/11 0700 02/11 0701 - 02/12 0700    P  O   480 720    IV Piggyback  1050     Total Intake(mL/kg)  1530 (11 4) 720 (5 4)    Net  +1530 +720           Unmeasured Urine Occurrence   5 x          Physical Exam   Constitutional: She is oriented to person, place, and time  She appears well-developed and well-nourished  No distress  HENT:   Head: Normocephalic and atraumatic  Eyes: Pupils are equal, round, and reactive to light  EOM are normal  Right eye exhibits no discharge  Left eye exhibits no discharge  No scleral icterus  Injected sclera b/l   Neck: Neck supple  Cardiovascular: Normal rate     Pulmonary/Chest: Effort normal    Abdominal: Soft  She exhibits no distension  obese   Neurological: She is oriented to person, place, and time  She has normal strength  She has an abnormal Finger-Nose-Finger Test    Skin: Skin is warm and dry  No rash noted  Psychiatric: She has a normal mood and affect  Her speech is normal and behavior is normal  Judgment and thought content normal      Neurologic Exam     Mental Status   Oriented to person, place, and time  Follows 2 step commands  Attention: normal  Concentration: normal    Speech: speech is normal   Level of consciousness: alert  Knowledge: good  Normal comprehension  Cranial Nerves     CN III, IV, VI   Pupils are equal, round, and reactive to light  Extraocular motions are normal    Right pupil: Shape: regular  Reactivity: brisk  Left pupil: Shape: regular  Reactivity: brisk  Nystagmus: none   Upgaze: normal  Conjugate gaze: present    CN V   Facial sensation intact  CN VII   Facial expression full, symmetric  CN VIII   Hearing: intact    CN XI   Right trapezius strength: normal  Left trapezius strength: normal    CN XII   Tongue: not atrophic  Fasciculations: absent  Tongue deviation: none  Able to read message board in room     Motor Exam   Muscle bulk: normal  Overall muscle tone: normal  Right arm pronator drift: absent  Left arm pronator drift: absent    Strength   Strength 5/5 throughout  Sensory Exam   Light touch normal      Gait, Coordination, and Reflexes     Coordination   Finger to nose coordination: abnormal    Tremor   Resting tremor: absent  Intention tremor: absent  Action tremor: absent    Reflexes   Right ankle clonus: absent  Left ankle clonus: absent      Vitals:Blood pressure (!) 177/101, pulse 89, temperature 98 3 °F (36 8 °C), resp  rate 16, height 5' 8" (1 727 m), weight 134 kg (296 lb 4 8 oz), SpO2 92 %, not currently breastfeeding  ,Body mass index is 45 05 kg/m²       Lab Results:   Results from last 7 days   Lab Units 02/11/20  0921 02/10/20  1142   WBC Thousand/uL 9 71 10 93*   HEMOGLOBIN g/dL 10 7* 11 2*   HEMATOCRIT % 35 8 37 6   PLATELETS Thousands/uL 378 378   NEUTROS PCT % 57 70   MONOS PCT % 6 4     Results from last 7 days   Lab Units 02/10/20  1142   POTASSIUM mmol/L 3 5   CHLORIDE mmol/L 102   CO2 mmol/L 30   BUN mg/dL 14   CREATININE mg/dL 0 91   CALCIUM mg/dL 9 5   ALK PHOS U/L 152*   ALT U/L 19   AST U/L 12                 No results found for: TROPONINT  ABG:  Lab Results   Component Value Date    PHART 7 340 (L) 08/04/2018    BJZ2MVH 53 0 (H) 08/04/2018    PO2ART 36 0 (LL) 08/04/2018    [id number] 28 6 (H) 08/04/2018    BEART 1 6 08/04/2018       Imaging Studies: I have personally reviewed pertinent reports  and I have personally reviewed pertinent films in PACS    Cta Head And Neck With And Without Contrast    Result Date: 2/10/2020  Impression: No acute intracranial hemorrhage  Mild cerebral chronic microangiopathic disease, presumably secondary to hypertension  Stable 3 mm left cavernous ICA aneurysms when comparing to the prior study of September 23, 2019  Generalized parenchymal volume loss, accelerated for age, somewhat temporal and frontal predominant  Correlate for underlying neurodegenerative disease or clinical signs and symptoms of NPH  Consider follow-up MR NeuroQuant imaging  3 mm extradural and cavernous cave left ICA aneurysms  Neurosurgical consultation is recommended  Workstation performed: [id number]       EKG, Pathology, and Other Studies: I have personally reviewed pertinent reports  VTE Prophylaxis: Sequential compression device Pireo Acevedo)     Code Status: Level 1 - Full Code  Advance Directive and Living Will:      Power of :    POLST:      Counseling / Coordination of Care  I spent 30 minutes with the patient

## 2020-02-11 NOTE — ASSESSMENT & PLAN NOTE
Lab Results   Component Value Date    HGBA1C 9 3 (H) 12/06/2019       Recent Labs     02/10/20  1716 02/10/20  2138   POCGLU 472* 379*       Blood Sugar Average: Last 72 hrs:  (P) 425 5   Home regimen Humalog 25U before each meal, and basaglar 40U bid  Will start Lantus 40U HS and SSI #4  Monitor BG closely

## 2020-02-11 NOTE — DISCHARGE SUMMARY
Discharge Summary - Eveline Johnson 62 y o  female MRN: [id number]    Unit/Bed#: [facility] 729-01 Encounter: [id number]    Admission Date: 2/10/2020   Discharge Date: 2/11/2020    Diagnosis: Aneurysm ([address] ) [I72 9]  Hypertension [I10]  Uncontrolled hypertension [I10]    Important Physician Related Follow Up:   · Follow-up with Neurology in 1 week  · Follow-up with PCP in 1 week  · Follow-up with Cardiology    Procedures Performed: No orders of the defined types were placed in this encounter  Significant Findings/Abnormal Results with this admission:  · CTA head: Stable 3 mm left cavernous ICA aneurysms when comparing to the prior study of September 23, 2019  Hospital Course:     HPI: Eveline Johnson is a 62 y o  female who was sent in from the neurologist's office for elevated blood pressures  She has a history of aneurysm found back in September 2019 and chronic headaches that have been worsening  She was sent to the ED from neurologist office after blood pressure reading of 201/99 without any neurological deficit  She has a history of hypertension ,diabetes with diabetic retinopathy status post transplant over 10 years ago and polymyalgia rheumatica for which she is on prednisone 10 mg daily  She says she has been having headaches for few months now  She states her headache is currently a 6/10 and to get this bad as it 10 at 10  She denied any nausea, vomiting confusion, visual disturbance since beyond her baseline, chest pain , shortness of breath, abdominal pain, weakness or syncope patient admits she not take any of her medication today      In presentation in ED her blood pressure was 239/116  This improved after 20 mg of labetalol  Neurosurgery was consulted in the ED  There was no immediate intervention planned  She was restarted on home regimen for hypertension of losartan 100 mg as well as diltiazem  Blood pressure is improved    On day of discharge she was seen by Neurosurgery and no intervention was planned recommended medical management and follow-up outpatient since aneurysm was stable compared to prior imaging  [facility] so she had them at discharge  Medication compliance wa stressed as well as follow up with Neurology and PCP and Cardiology  At discharge she was stable and agreement with plan  Her headache had improved on Tylenol  All her questions were answered  Case Management organized a ride home for her  At discharge her med rec was updated in is current  Complications:  None    Discharge Diagnosis:  Hypertensive urgency    Exam on Day of Discharge:     Condition at Discharge: good     Discharge instructions/Information to patient and family:   See after visit summary for information provided to patient and family  Provisions for Follow-Up Care:  See after visit summary for information related to follow-up care and any pertinent home health orders  Disposition: Home    Planned Readmission: No    Discharge Statement   I spent 45  minutes discharging the patient  This time was spent on the day of discharge  I had direct contact with the patient on the day of discharge  Additional documentation is required if more than 30 minutes were spent on discharge  Discharge Medications:  See after visit summary for reconciled discharge medications provided to patient and family    Medication changes made with this admission:    Blood pressure regimen:  Losartan 100 mg daily, diltiazem 480mg daily    Flroence Gautam MD  [facility] 26 PGY 2  2/11/2020  4:57 PM

## 2020-02-11 NOTE — PROGRESS NOTES
Progress Note - Shadia Moreno 1960, 62 y o  female MRN: [id number]    Unit/Bed#: [facility] 729-01 Encounter: [id number]    Primary Care Provider: Artemio Hernandez PA-C   Date and time admitted to hospital: 2/10/2020 10:48 AM        Hypertensive urgency  Assessment & Plan  BP today 169/93  Patient not taking PTA meds besides Losartan 100 mg  Started regiment of Losartan 100 mg and Cardizem 240 mg  - If blood pressures continue to be elevated from baseline, will increase dose of Cardizem    Elevated BP to 201/99 in the office, 230s/110s in ED, improved with labetalol 20 mg in ED, trending down to 150s/80s  Associated with headache, no changes in vision, chest pain, or SOB  Random BPs at office visits 120/70, 140/70 in 1/2020  Home regimen includes amlodipine 10 mg QD, Losartan 100 mg QD, Lopressor 25 mg QD, Cardizem 420 mg QD, HCTZ 25 mg QD  Compliant, but ran out of cardizem the last 2 weeks  This elevated BP episode could be from chronic systemic steroids use for polymyalgia rheumatica started 11/6/2020, currently on Prednisone 10 mg QD  Will continue PTA meds, monitor BP closely      PMR (polymyalgia rheumatica) ([facility])  Assessment & Plan  Follows with rheum as outpatient   Continue PTA Prednisone 10 mg QD    Visual impairment in both eyes  Assessment & Plan  H/o diabetic retinopathy  Closely monitor blood glucose  Continue to monitor BP to avoid acute vision disturbances      Hyperlipidemia  Assessment & Plan  Continue PTA medication, Lipitor 40mg QD    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, with long-term current use of insulin [facility])  Assessment & Plan  Lab Results   Component Value Date    HGBA1C 9 3 (H) 12/06/2019       Recent Labs     02/10/20  1716 02/10/20  2138 02/11/20  0639   POCGLU 472* 379* 300*       Blood Sugar Average: Last 72 hrs:  (P) 641 [id number]   Home regimen Humalog 25U before each meal, and basaglar 45 in the morning and 40U at night  Will start Lantus 40U HS and SSI #4  Monitor BG closely  * Left cavernous carotid aneurysm  Assessment & Plan  CTA head/neck showed stable 3 mm left cavernous ICA aneurysms when comparing to the prior study of September 23, 2019   -No focal neurological deficits on exam  Will appreciate neurovascular surgery recommendations  High risk of rupture will hold off on chemical DVT prophylaxis  Will admit to step-down level 2 for more frequent neuro checks and vital signs    CTA impression:  No acute intracranial hemorrhage  Mild cerebral chronic microangiopathic disease, presumably secondary to hypertension  Stable 3 mm left cavernous ICA aneurysms when comparing to the prior study of September 23, 2019  Generalized parenchymal volume loss, accelerated for age, somewhat temporal and frontal predominant  Correlate for underlying neurodegenerative disease or clinical signs and symptoms of NPH  Consider follow-up MR NeuroQuant imaging  3 mm extradural and cavernous cave left ICA aneurysms  Neurosurgical consultation is recommended  Diet: Carb control  Code Status: Level 1  Dispo: Continue inpatient management    Plan D/W Dr Lizbet Bautista and [facility] Team    Subjective:   Patient is feeling well this morning and slept well overnight  She is very conversational this morning and in good spirits  She is asymptomatic this morning  She denies N/V, SOB, chest pain, chest tightness, abdominal pain, and is urinating appropriately and had a BM last evening  Objective:     Vitals: Blood pressure (!) 181/106, pulse 85, temperature 98 °F (36 7 °C), resp  rate 20, height 5' 8" (1 727 m), weight 134 kg (296 lb 4 8 oz), SpO2 96 %, not currently breastfeeding  ,Body mass index is 45 05 kg/m²  Intake/Output Summary (Last 24 hours) at 2/11/2020 1459  Last data filed at 2/11/2020 5032  Gross per 24 hour   Intake 1840 ml   Output --   Net 1840 ml       Physical Exam:   Physical Exam   Constitutional: She is oriented to person, place, and time   She appears well-developed and well-nourished  No distress  HENT:   Head: Normocephalic and atraumatic  Mouth/Throat: Oropharynx is clear and moist  No oropharyngeal exudate  Eyes: Pupils are equal, round, and reactive to light  EOM are normal  Right eye exhibits no discharge  Left eye exhibits no discharge  No scleral icterus  Minor b/l conjunctival injections   Neck: Normal range of motion  Neck supple  Cardiovascular: Normal rate, regular rhythm, normal heart sounds and intact distal pulses  Exam reveals no gallop and no friction rub  No murmur heard  Pulmonary/Chest: Effort normal and breath sounds normal  No stridor  No respiratory distress  She has no wheezes  She has no rales  Abdominal: Soft  Bowel sounds are normal  She exhibits no distension  There is no tenderness  Musculoskeletal: Normal range of motion  She exhibits no edema, tenderness or deformity  Neurological: She is alert and oriented to person, place, and time  Skin: Skin is warm and dry  Capillary refill takes less than 2 seconds  No rash noted  She is not diaphoretic  No erythema  No pallor  Psychiatric: She has a normal mood and affect  Her behavior is normal        Invasive Devices     Peripheral Intravenous Line            Peripheral IV 02/10/20 Left Antecubital 1 day                           Lab and other studies:  I have personally reviewed pertinent reports       Admission on 02/10/2020   Component Date Value    WBC 02/10/2020 10 93*    RBC 02/10/2020 4 94     Hemoglobin 02/10/2020 11 2*    Hematocrit 02/10/2020 37 6     MCV 02/10/2020 76*    MCH 02/10/2020 22 7*    MCHC 02/10/2020 29 8*    RDW 02/10/2020 18 2*    MPV 02/10/2020 10 5     Platelets 16/21/2383 378     nRBC 02/10/2020 0     Neutrophils Relative 02/10/2020 70     Immat GRANS % 02/10/2020 1     Lymphocytes Relative 02/10/2020 22     Monocytes Relative 02/10/2020 4     Eosinophils Relative 02/10/2020 2     Basophils Relative 02/10/2020 1     Neutrophils Absolute 02/10/2020 7 74*    Immature Grans Absolute 02/10/2020 0 07     Lymphocytes Absolute 02/10/2020 2 43     Monocytes Absolute 02/10/2020 0 48     Eosinophils Absolute 02/10/2020 0 16     Basophils Absolute 02/10/2020 0 05     Sodium 02/10/2020 139     Potassium 02/10/2020 3 5     Chloride 02/10/2020 102     CO2 02/10/2020 30     ANION GAP 02/10/2020 7     BUN 02/10/2020 14     Creatinine 02/10/2020 0 91     Glucose 02/10/2020 261*    Calcium 02/10/2020 9 5     AST 02/10/2020 12     ALT 02/10/2020 19     Alkaline Phosphatase 02/10/2020 152*    Total Protein 02/10/2020 7 8     Albumin 02/10/2020 3 0*    Total Bilirubin 02/10/2020 0 28     eGFR 02/10/2020 79     Troponin I 02/10/2020 <0 02     Troponin I 02/10/2020 0 02     POC Glucose 02/10/2020 472*    Troponin I 02/10/2020 0 02     Ventricular Rate 02/10/2020 81     Atrial Rate 02/10/2020 81     [place] Interval 02/10/2020 168     QRSD Interval 02/10/2020 96     QT Interval 02/10/2020 396     QTC Interval 02/10/2020 460     P Axis 02/10/2020 61     QRS Axis 02/10/2020 -2     T Wave Axis 02/10/2020 44     POC Glucose 02/10/2020 379*    POC Glucose 02/11/2020 300*    WBC 02/11/2020 9 71     RBC 02/11/2020 4 73     Hemoglobin 02/11/2020 10 7*    Hematocrit 02/11/2020 35 8     MCV 02/11/2020 76*    MCH 02/11/2020 22 6*    MCHC 02/11/2020 29 9*    RDW 02/11/2020 18 4*    MPV 02/11/2020 9 9     Platelets 01/97/3916 378     nRBC 02/11/2020 0     Neutrophils Relative 02/11/2020 57     Immat GRANS % 02/11/2020 1     Lymphocytes Relative 02/11/2020 33     Monocytes Relative 02/11/2020 6     Eosinophils Relative 02/11/2020 2     Basophils Relative 02/11/2020 1     Neutrophils Absolute 02/11/2020 5 61     Immature Grans Absolute 02/11/2020 0 05     Lymphocytes Absolute 02/11/2020 3 23     Monocytes Absolute 02/11/2020 0 55     Eosinophils Absolute 02/11/2020 0 22     Basophils Absolute 02/11/2020 0 05     POC Glucose 02/11/2020 371*       Recent Results (from the past 24 hour(s))   Fingerstick Glucose (POCT)    Collection Time: 02/10/20  5:16 PM   Result Value Ref Range    POC Glucose 472 (H) 65 - 140 mg/dl   Troponin I    Collection Time: 02/10/20  5:18 PM   Result Value Ref Range    Troponin I 0 02 <=0 04 ng/mL   Troponin I    Collection Time: 02/10/20  8:30 PM   Result Value Ref Range    Troponin I 0 02 <=0 04 ng/mL   Fingerstick Glucose (POCT)    Collection Time: 02/10/20  9:38 PM   Result Value Ref Range    POC Glucose 379 (H) 65 - 140 mg/dl   Fingerstick Glucose (POCT)    Collection Time: 02/11/20  6:39 AM   Result Value Ref Range    POC Glucose 300 (H) 65 - 140 mg/dl   CBC and differential    Collection Time: 02/11/20  9:21 AM   Result Value Ref Range    WBC 9 71 4 31 - 10 16 Thousand/uL    RBC 4 73 3 81 - 5 12 Million/uL    Hemoglobin 10 7 (L) 11 5 - 15 4 g/dL    Hematocrit 35 8 34 8 - 46 1 %    MCV 76 (L) 82 - 98 fL    MCH 22 6 (L) 26 8 - 34 3 pg    MCHC 29 9 (L) 31 4 - 37 4 g/dL    RDW 18 4 (H) 11 6 - 15 1 %    MPV 9 9 8 9 - 12 7 fL    Platelets 758 991 - 994 Thousands/uL    nRBC 0 /100 WBCs    Neutrophils Relative 57 43 - 75 %    Immat GRANS % 1 0 - 2 %    Lymphocytes Relative 33 14 - 44 %    Monocytes Relative 6 4 - 12 %    Eosinophils Relative 2 0 - 6 %    Basophils Relative 1 0 - 1 %    Neutrophils Absolute 5 61 1 85 - 7 62 Thousands/µL    Immature Grans Absolute 0 05 0 00 - 0 20 Thousand/uL    Lymphocytes Absolute 3 23 0 60 - 4 47 Thousands/µL    Monocytes Absolute 0 55 0 17 - 1 22 Thousand/µL    Eosinophils Absolute 0 22 0 00 - 0 61 Thousand/µL    Basophils Absolute 0 05 0 00 - 0 10 Thousands/µL   Fingerstick Glucose (POCT)    Collection Time: 02/11/20 11:53 AM   Result Value Ref Range    POC Glucose 371 (H) 65 - 140 mg/dl     Blood Culture:   Lab Results   Component Value Date    BLOODCX No Growth After 5 Days   09/19/2019   ,   Urinalysis:   Lab Results   Component Value Date    COLORU Yellow 10/09/2019    COLORU Yellow 05/06/2014    CLARITYU Clear 10/09/2019    CLARITYU Clear 05/06/2014    SPECGRAV 1 010 10/09/2019    SPECGRAV 1 010 05/06/2014    PHUR 6 5 10/09/2019    PHUR 5 5 05/06/2014    LEUKOCYTESUR Negative 10/09/2019    LEUKOCYTESUR Negative 05/06/2014    NITRITE Negative 10/09/2019    NITRITE Negative 05/06/2014    PROTEINUA 100 (2+) (A) 05/06/2014    GLUCOSEU Negative 10/09/2019    GLUCOSEU 500 (1/2%) (A) 05/06/2014    KETONESU Negative 10/09/2019    KETONESU Negative 05/06/2014    BILIRUBINUR Negative 10/09/2019    BILIRUBINUR Negative 05/06/2014    BLOODU Negative 10/09/2019    BLOODU Trace (A) 05/06/2014   ,   Urine Culture: No results found for: URINECX,   Wound Culure: No results found for: WOUNDCULT      Imaging: CTA head and neck:  No acute intracranial hemorrhage      Mild cerebral chronic microangiopathic disease, presumably secondary to hypertension      Stable 3 mm left cavernous ICA aneurysms when comparing to the prior study of September 23, 2019      Generalized parenchymal volume loss, accelerated for age, somewhat temporal and frontal predominant  Correlate for underlying neurodegenerative disease or clinical signs and symptoms of NPH  Consider follow-up MR NeuroQuant imaging      3 mm extradural and cavernous cave left ICA aneurysms  Neurosurgical consultation is recommended         VTE Pharmacologic Prophylaxis: Reason for no pharmacologic prophylaxis potential intracranial bleed  VTE Mechanical Prophylaxis: sequential compression device    Current Facility-Administered Medications   Medication Dose Route Frequency    acetaminophen (TYLENOL) tablet 650 mg  650 mg Oral Q6H [facility]    albuterol (PROVENTIL HFA,VENTOLIN HFA) inhaler 2 puff  2 puff Inhalation Q6H PRN    atorvastatin (LIPITOR) tablet 40 mg  40 mg Oral Daily    budesonide-formoterol (SYMBICORT) 80-4 5 MCG/ACT inhaler 2 puff  2 puff Inhalation Daily    diltiazem (CARDIZEM CD) 24 hr capsule 240 mg  240 mg Oral Daily  gabapentin (NEURONTIN) capsule 100 mg  100 mg Oral BID    insulin glargine (LANTUS) subcutaneous injection 40 Units 0 4 mL  40 Units Subcutaneous HS    insulin lispro (HumaLOG) 100 units/mL subcutaneous injection 2-12 Units  2-12 Units Subcutaneous TID AC    losartan (COZAAR) tablet 100 mg  100 mg Oral Daily    predniSONE tablet 10 mg  10 mg Oral Daily       Shoaib Rao MD  Family Medicine Resident PGY2

## 2020-02-11 NOTE — UTILIZATION REVIEW
Notification of Observation Admission/Observation Authorization Request   This is a Notification of Observation Admission for [address]  Be advised that this patient was admitted to our facility under Observation Status  Contact Ginnie Cowden at [phone] for additional admission information  [facility] DEPT  DEDICATED -[phone]  Patient Name:   Kumar Caballero   YOB: 1960       [address]   [address]:   [facility]  Tax ID: [id number]  NPI: [id number] Attending Provider/NPI: Sugey Samson Md [[id number]]   Place of Service Code: 25     Place of Service Name:  CPT Code for Observation:  On [facility]  CPT  / CPT 61132   Start Date: 2/10/2020     Discharge Date & Time: No discharge date for patient encounter  Type of Admission: Observation Status Discharge Disposition (if discharged): Home/Self Care   Patient Diagnoses: Aneurysm ([address] ) [I72 9]  Hypertension [I10]  Uncontrolled hypertension [I10]     Orders: Admission Orders (From admission, onward)     Ordered        02/10/20 1433  Place in Observation  Once                    Assigned Utilization Review Contact: Ginnie Cowden  Utilization   Network Utilization Review Department  Phone: [phone]; Fax [phone]  Email: [email or web address]   ATTENTION PAYERS: Please call the assigned Utilization  directly with any questions or concerns ALL voicemails in the department are confidential  Send all requests for admission clinical reviews, approved or denied determinations and any other requests to dedicated fax number belonging to the campus where the patient is receiving treatment

## 2020-02-11 NOTE — ASSESSMENT & PLAN NOTE
CTA head/neck showed stable 3 mm left cavernous ICA aneurysms when comparing to the prior study of September 23, 2019   -No focal neurological deficits on exam  Will appreciate neurovascular surgery recommendations  High risk of rupture will hold off on chemical DVT prophylaxis  Will admit to step-down level 2 for more frequent neuro checks and vital signs    CTA impression:  No acute intracranial hemorrhage  Mild cerebral chronic microangiopathic disease, presumably secondary to hypertension  Stable 3 mm left cavernous ICA aneurysms when comparing to the prior study of September 23, 2019  Generalized parenchymal volume loss, accelerated for age, somewhat temporal and frontal predominant  Correlate for underlying neurodegenerative disease or clinical signs and symptoms of NPH  Consider follow-up MR NeuroQuant imaging  3 mm extradural and cavernous cave left ICA aneurysms  Neurosurgical consultation is recommended

## 2020-02-11 NOTE — ASSESSMENT & PLAN NOTE
Elevated BP to 201/99 in the office, 230s/110s in ED, improved with labetalol 20 mg in ED, trending down to 150s/80s  Associated with headache, no changes in vision, chest pain, or SOB  Random BPs at office visits 120/70, 140/70 in 1/2020  Home regimen includes amlodipine 10 mg QD, Losartan 100 mg QD, Lopressor 25 mg QD, Cardizem 420 mg QD, HCTZ 25 mg QD  Compliant, but ran out of cardizem the last 2 weeks  This elevated BP episode could be from chronic systemic steroids use for polymyalgia rheumatica started 11/6/2020, currently on Prednisone 10 mg QD  Will continue PTA meds, monitor BP closely

## 2020-02-12 VITALS
SYSTOLIC BLOOD PRESSURE: 135 MMHG | BODY MASS INDEX: 44.41 KG/M2 | DIASTOLIC BLOOD PRESSURE: 67 MMHG | HEIGHT: 68 IN | WEIGHT: 293 LBS | OXYGEN SATURATION: 96 % | TEMPERATURE: 98 F | RESPIRATION RATE: 17 BRPM | HEART RATE: 69 BPM

## 2020-02-12 LAB
ALBUMIN SERPL BCP-MCNC: 2.6 G/DL (ref 3.5–5)
ALP SERPL-CCNC: 121 U/L (ref 46–116)
ALT SERPL W P-5'-P-CCNC: 15 U/L (ref 12–78)
ANION GAP SERPL CALCULATED.3IONS-SCNC: 5 MMOL/L (ref 4–13)
AST SERPL W P-5'-P-CCNC: 8 U/L (ref 5–45)
BILIRUB SERPL-MCNC: 0.31 MG/DL (ref 0.2–1)
BUN SERPL-MCNC: 22 MG/DL (ref 5–25)
CALCIUM SERPL-MCNC: 9.2 MG/DL (ref 8.3–10.1)
CHLORIDE SERPL-SCNC: 106 MMOL/L (ref 100–108)
CO2 SERPL-SCNC: 29 MMOL/L (ref 21–32)
CREAT SERPL-MCNC: 0.83 MG/DL (ref 0.6–1.3)
GFR SERPL CREATININE-BSD FRML MDRD: 88 ML/MIN/1.73SQ M
GLUCOSE P FAST SERPL-MCNC: 103 MG/DL (ref 65–99)
GLUCOSE SERPL-MCNC: 103 MG/DL (ref 65–140)
GLUCOSE SERPL-MCNC: 105 MG/DL (ref 65–140)
GLUCOSE SERPL-MCNC: 135 MG/DL (ref 65–140)
GLUCOSE SERPL-MCNC: 201 MG/DL (ref 65–140)
GLUCOSE SERPL-MCNC: 205 MG/DL (ref 65–140)
POTASSIUM SERPL-SCNC: 3.3 MMOL/L (ref 3.5–5.3)
PROT SERPL-MCNC: 6.8 G/DL (ref 6.4–8.2)
SODIUM SERPL-SCNC: 140 MMOL/L (ref 136–145)

## 2020-02-12 PROCEDURE — 80053 COMPREHEN METABOLIC PANEL: CPT | Performed by: FAMILY MEDICINE

## 2020-02-12 PROCEDURE — NC001 PR NO CHARGE: Performed by: PHYSICIAN ASSISTANT

## 2020-02-12 PROCEDURE — 82948 REAGENT STRIP/BLOOD GLUCOSE: CPT

## 2020-02-12 PROCEDURE — 99217 PR OBSERVATION CARE DISCHARGE MANAGEMENT: CPT | Performed by: FAMILY MEDICINE

## 2020-02-12 RX ORDER — GABAPENTIN 100 MG/1
CAPSULE ORAL
Status: COMPLETED
Start: 2020-02-12 | End: 2020-02-12

## 2020-02-12 RX ORDER — HYDROCHLOROTHIAZIDE 25 MG/1
25 TABLET ORAL DAILY
Status: DISCONTINUED | OUTPATIENT
Start: 2020-02-12 | End: 2020-02-12 | Stop reason: HOSPADM

## 2020-02-12 RX ORDER — ACETAMINOPHEN 325 MG/1
TABLET ORAL
Status: COMPLETED
Start: 2020-02-12 | End: 2020-02-12

## 2020-02-12 RX ORDER — HYDROCHLOROTHIAZIDE 25 MG/1
25 TABLET ORAL DAILY
Qty: 90 TABLET | Refills: 0 | Status: SHIPPED | OUTPATIENT
Start: 2020-02-13 | End: 2020-02-12 | Stop reason: HOSPADM

## 2020-02-12 RX ORDER — LABETALOL 20 MG/4 ML (5 MG/ML) INTRAVENOUS SYRINGE
10 ONCE AS NEEDED
Status: DISCONTINUED | OUTPATIENT
Start: 2020-02-12 | End: 2020-02-12 | Stop reason: HOSPADM

## 2020-02-12 RX ORDER — DILTIAZEM HYDROCHLORIDE 240 MG/1
CAPSULE, COATED, EXTENDED RELEASE ORAL
Status: COMPLETED
Start: 2020-02-12 | End: 2020-02-12

## 2020-02-12 RX ORDER — ATORVASTATIN CALCIUM 40 MG/1
TABLET, FILM COATED ORAL
Status: COMPLETED
Start: 2020-02-12 | End: 2020-02-12

## 2020-02-12 RX ORDER — PREDNISONE 10 MG/1
TABLET ORAL
Status: COMPLETED
Start: 2020-02-12 | End: 2020-02-12

## 2020-02-12 RX ORDER — INSULIN GLARGINE 100 [IU]/ML
INJECTION, SOLUTION SUBCUTANEOUS
Status: COMPLETED
Start: 2020-02-12 | End: 2020-02-12

## 2020-02-12 RX ORDER — POTASSIUM CHLORIDE 20 MEQ/1
TABLET, EXTENDED RELEASE ORAL
Status: COMPLETED
Start: 2020-02-12 | End: 2020-02-12

## 2020-02-12 RX ORDER — POTASSIUM CHLORIDE 20 MEQ/1
40 TABLET, EXTENDED RELEASE ORAL ONCE
Status: COMPLETED | OUTPATIENT
Start: 2020-02-12 | End: 2020-02-12

## 2020-02-12 RX ORDER — LOSARTAN POTASSIUM 50 MG/1
TABLET ORAL
Status: COMPLETED
Start: 2020-02-12 | End: 2020-02-12

## 2020-02-12 RX ADMIN — HYDROCHLOROTHIAZIDE 25 MG: 25 TABLET ORAL at 11:51

## 2020-02-12 RX ADMIN — POTASSIUM CHLORIDE 40 MEQ: 1500 TABLET, EXTENDED RELEASE ORAL at 10:16

## 2020-02-12 RX ADMIN — DILTIAZEM HYDROCHLORIDE 480 MG: 240 CAPSULE, COATED, EXTENDED RELEASE ORAL at 08:01

## 2020-02-12 RX ADMIN — SODIUM CHLORIDE 125 ML/HR: 0.9 INJECTION, SOLUTION INTRAVENOUS at 00:56

## 2020-02-12 RX ADMIN — INSULIN LISPRO 25 UNITS: 100 INJECTION, SOLUTION INTRAVENOUS; SUBCUTANEOUS at 11:29

## 2020-02-12 RX ADMIN — INSULIN LISPRO 4 UNITS: 100 INJECTION, SOLUTION INTRAVENOUS; SUBCUTANEOUS at 11:31

## 2020-02-12 RX ADMIN — GABAPENTIN 100 MG: 100 CAPSULE ORAL at 08:00

## 2020-02-12 RX ADMIN — LOSARTAN POTASSIUM 100 MG: 50 TABLET, FILM COATED ORAL at 07:59

## 2020-02-12 RX ADMIN — POTASSIUM CHLORIDE 40 MEQ: 20 TABLET, EXTENDED RELEASE ORAL at 10:16

## 2020-02-12 RX ADMIN — ATORVASTATIN CALCIUM 40 MG: 40 TABLET, FILM COATED ORAL at 08:00

## 2020-02-12 RX ADMIN — ACETAMINOPHEN 650 MG: 325 TABLET ORAL at 06:29

## 2020-02-12 RX ADMIN — PREDNISONE 10 MG: 10 TABLET ORAL at 08:01

## 2020-02-12 RX ADMIN — ACETAMINOPHEN 650 MG: 325 TABLET ORAL at 00:56

## 2020-02-12 RX ADMIN — INSULIN LISPRO 4 UNITS: 100 INJECTION, SOLUTION INTRAVENOUS; SUBCUTANEOUS at 02:16

## 2020-02-12 RX ADMIN — INSULIN GLARGINE 45 UNITS: 100 INJECTION, SOLUTION SUBCUTANEOUS at 08:01

## 2020-02-12 RX ADMIN — SODIUM CHLORIDE 125 ML/HR: 0.9 INJECTION, SOLUTION INTRAVENOUS at 10:15

## 2020-02-12 RX ADMIN — BUDESONIDE AND FORMOTEROL FUMARATE DIHYDRATE 2 PUFF: 80; 4.5 AEROSOL RESPIRATORY (INHALATION) at 08:01

## 2020-02-12 RX ADMIN — INSULIN LISPRO 25 UNITS: 100 INJECTION, SOLUTION INTRAVENOUS; SUBCUTANEOUS at 08:02

## 2020-02-12 RX ADMIN — ACETAMINOPHEN 650 MG: 325 TABLET ORAL at 11:31

## 2020-02-12 NOTE — TELEPHONE ENCOUNTER
Patient is calling to inform office that she is currently admitted into the hospital because her BP is too high

## 2020-02-12 NOTE — PLAN OF CARE
Problem: Potential for Falls  Goal: Patient will remain free of falls  Description  INTERVENTIONS:  - Assess patient frequently for physical needs  -  Identify cognitive and physical deficits and behaviors that affect risk of falls    -  [place] fall precautions as indicated by assessment   - Educate patient/family on patient safety including physical limitations  - Instruct patient to call for assistance with activity based on assessment  - Modify environment to reduce risk of injury  - Consider OT/PT consult to assist with strengthening/mobility  Outcome: Progressing     Problem: PAIN - ADULT  Goal: Verbalizes/displays adequate comfort level or baseline comfort level  Description  Interventions:  - Encourage patient to monitor pain and request assistance  - Assess pain using appropriate pain scale  - Administer analgesics based on type and severity of pain and evaluate response  - Implement non-pharmacological measures as appropriate and evaluate response  - Consider cultural and social influences on pain and pain management  - Notify physician/advanced practitioner if interventions unsuccessful or patient reports new pain  Outcome: Progressing     Problem: INFECTION - ADULT  Goal: Absence or prevention of progression during hospitalization  Description  INTERVENTIONS:  - Assess and monitor for signs and symptoms of infection  - Monitor lab/diagnostic results  - Monitor all insertion sites, i e  indwelling lines, tubes, and drains  - [place] appropriate cooling/warming therapies per order  - Administer medications as ordered  - Instruct and encourage patient and family to use good hand hygiene technique  - Identify and instruct in appropriate isolation precautions for identified infection/condition   Outcome: Progressing  Goal: Absence of fever/infection during neutropenic period  Description  INTERVENTIONS:  - Monitor WBC    Outcome: Progressing     Problem: SAFETY ADULT  Goal: Maintain or return to baseline ADL function  Description  INTERVENTIONS:  -  Assess patient's ability to carry out ADLs; assess patient's baseline for ADL function and identify physical deficits which impact ability to perform ADLs (bathing, care of mouth/teeth, toileting, grooming, dressing, etc )  - Assess/evaluate cause of self-care deficits   - Assess range of motion  - Assess patient's mobility; develop plan if impaired  - Assess patient's need for assistive devices and provide as appropriate  - Encourage maximum independence but intervene and supervise when necessary  - Involve family in performance of ADLs  - Assess for home care needs following discharge   - Consider OT consult to assist with ADL evaluation and planning for discharge  - Provide patient education as appropriate  Outcome: Progressing  Goal: Maintain or return mobility status to optimal level  Description  INTERVENTIONS:  - Assess patient's baseline mobility status (ambulation, transfers, stairs, etc )    - Identify cognitive and physical deficits and behaviors that affect mobility  - Identify mobility aids required to assist with transfers and/or ambulation (gait belt, sit-to-stand, lift, walker, cane, etc )  - [place] fall precautions as indicated by assessment  - Record patient progress and toleration of activity level on Mobility SBAR; progress patient to next Phase/Stage  - Instruct patient to call for assistance with activity based on assessment  - Consider rehabilitation consult to assist with strengthening/weightbearing, etc   Outcome: Progressing     Problem: DISCHARGE PLANNING  Goal: Discharge to home or other facility with appropriate resources  Description  INTERVENTIONS:  - Identify barriers to discharge w/patient and caregiver  - Arrange for needed discharge resources and transportation as appropriate  - Identify discharge learning needs (meds, wound care, etc )  - Arrange for interpretive services to assist at discharge as needed  - Refer to Case Management Department for coordinating discharge planning if the patient needs post-hospital services based on physician/advanced practitioner order or complex needs related to functional status, cognitive ability, or social support system  Outcome: Progressing     Problem: Knowledge Deficit  Goal: Patient/family/caregiver demonstrates understanding of disease process, treatment plan, medications, and discharge instructions  Description  Complete learning assessment and assess knowledge base  Interventions:  - Provide teaching at level of understanding  - Provide teaching via preferred learning methods  Outcome: Progressing     Problem: NEUROSENSORY - ADULT  Goal: Achieves stable or improved neurological status  Description  INTERVENTIONS  - Monitor and report changes in neurological status  - Monitor vital signs such as temperature, blood pressure, glucose, and any other labs ordered   - Monitor for seizure activity and implement precautions if appropriate       Outcome: Progressing  Goal: Achieves maximal functionality and self care  Description  INTERVENTIONS  - Monitor swallowing and airway patency with patient fatigue and changes in neurological status  - Encourage and assist patient to increase activity and self care     - Encourage visually impaired, hearing impaired and aphasic patients to use assistive/communication devices  Outcome: Progressing     Problem: CARDIOVASCULAR - ADULT  Goal: Maintains optimal cardiac output and hemodynamic stability  Description  INTERVENTIONS:  - Monitor I/O, vital signs and rhythm  - Monitor for S/S and trends of decreased cardiac output  - Administer and titrate ordered vasoactive medications to optimize hemodynamic stability  - Assess quality of pulses, skin color and temperature  - Assess for signs of decreased coronary artery perfusion  - Instruct patient to report change in severity of symptoms  Outcome: Progressing

## 2020-02-12 NOTE — DISCHARGE INSTRUCTIONS
Hypertension   WHAT YOU NEED TO KNOW:   Hypertension is high blood pressure (BP)  Your BP is the force of your blood moving against the walls of your arteries  Normal BP is less than 120/80  Prehypertension is between 120/80 and 139/89  Hypertension is 140/90 or higher  Hypertension causes your BP to get so high that your heart has to work much harder than normal  This can damage your heart  You can control hypertension with a healthy lifestyle or medicines  A controlled blood pressure helps protect your organs, such as your heart, lungs, brain, and kidneys  DISCHARGE INSTRUCTIONS:   Call 911 for any of the following:   · You have discomfort in your chest that feels like squeezing, pressure, fullness, or pain  · You become confused or have difficulty speaking  · You suddenly feel lightheaded or have trouble breathing  · You have pain or discomfort in your back, neck, jaw, stomach, or arm  Return to the emergency department if:   · You have a severe headache or vision loss  · You have weakness in an arm or leg  Contact your healthcare provider if:   · You feel faint, dizzy, confused, or drowsy  · You have been taking your BP medicine and your BP is still higher than your healthcare provider says it should be  · You have questions or concerns about your condition or care  Medicines: You may  need any of the following:  · Medicine  may be used to help lower your BP  You may need more than one type of medicine  Take the medicine exactly as directed  · Diuretics  help decrease extra fluid that collects in your body  This will help lower your BP  You may urinate more often while you take this medicine  · Cholesterol medicine  helps lower your cholesterol level  A low cholesterol level helps prevent heart disease and makes it easier to control your blood pressure  · Take your medicine as directed    Contact your healthcare provider if you think your medicine is not helping or if you have side effects  Tell him or her if you are allergic to any medicine  Keep a list of the medicines, vitamins, and herbs you take  Include the amounts, and when and why you take them  Bring the list or the pill bottles to follow-up visits  Carry your medicine list with you in case of an emergency  Follow up with your healthcare provider as directed: You will need to return to have your BP checked and to have other lab tests done  Write down your questions so you remember to ask them during your visits  Manage hypertension:  Talk with your healthcare provider about these and other ways to manage hypertension:  · Check your BP at home  Sit and rest for 5 minutes before you take your BP  Extend your arm and support it on a flat surface  Your arm should be at the same level as your heart  Follow the directions that came with your BP monitor  If possible, take at least 2 BP readings each time  Take your BP at least twice a day at the same times each day, such as morning and evening  Keep a record of your BP readings and bring it to your follow-up visits  Ask your healthcare provider what your BP should be  · Limit sodium (salt) as directed  Too much sodium can affect your fluid balance  Check labels to find low-sodium or no-salt-added foods  Some low-sodium foods use potassium salts for flavor  Too much potassium can also cause health problems  Your healthcare provider will tell you how much sodium and potassium are safe for you to have in a day  He or she may recommend that you limit sodium to 2,300 mg a day  · Follow the meal plan recommended by your healthcare provider  A dietitian or your provider can give you more information on low-sodium plans or the DASH (Dietary Approaches to Stop Hypertension) eating plan  The DASH plan is low in sodium, unhealthy fats, and total fat  It is high in potassium, calcium, and fiber  · Exercise to maintain a healthy weight    Exercise at least 30 minutes per day, on most days of the week  This will help decrease your blood pressure  Ask your healthcare provider about the best exercise plan for you  · Decrease stress  This may help lower your BP  Learn ways to relax, such as deep breathing or listening to music  · Limit alcohol  Women should limit alcohol to 1 drink a day  Men should limit alcohol to 2 drinks a day  A drink of alcohol is 12 ounces of beer, 5 ounces of wine, or 1½ ounces of liquor  · Do not smoke  Nicotine and other chemicals in cigarettes and cigars can increase your BP and also cause lung damage  Ask your healthcare provider for information if you currently smoke and need help to quit  E-cigarettes or smokeless tobacco still contain nicotine  Talk to your healthcare provider before you use these products  · Manage any other health conditions you have  Health conditions such as diabetes can increase your risk for hypertension  Follow your healthcare provider's instructions and take all your medicines as directed  © 2017 [address] [employer] Information is for End User's use only and may not be sold, redistributed or otherwise used for commercial purposes  All illustrations and images included in CareNotes® are the copyrighted property of [employer]  or Maco Hernández  The above information is an  only  It is not intended as medical advice for individual conditions or treatments  Talk to your doctor, nurse or pharmacist before following any medical regimen to see if it is safe and effective for you

## 2020-02-12 NOTE — SOCIAL WORK
Hydralazine prescription $2 12 delivered to pts room by [facility]   Jim arranged for 3:45PM today

## 2020-02-12 NOTE — DISCHARGE SUMMARY
Discharge Summary - Ho Garcia 62 y o  female MRN: [id number]    [facility] Encounter: [id number]    Admission Date: 2/10/2020   Discharge Date: 2/12/2020    Diagnosis: Aneurysm ([address] ) [I72 9]  Hypertension [I10]  Uncontrolled hypertension [I10]    Important Physician Related Follow Up:   · PCP in 1 week  · Neurology in 1 week  · Cardiology in 1 week    Procedures Performed: No orders of the defined types were placed in this encounter  Significant Findings/Abnormal Results with this admission:  · CTA head: Stable 3 mm left cavernous ICA aneurysms when comparing to the prior study of September 23, 2019  Hospital Course:     HPI:  Ho Garcia is a 62 y o  female who was sent in from the neurologist's office for elevated blood pressures  She has a history of aneurysm found back in September 2019 and chronic headaches that have been worsening  She was sent to the ED from neurologist office after blood pressure reading of 201/99 without any neurological deficit  She has a history of hypertension ,diabetes with diabetic retinopathy status post transplant over 10 years ago and polymyalgia rheumatica for which she is on prednisone 10 mg daily  She says she has been having headaches for few months now  She states her headache is currently a 6/10 and to get this bad as it 10 at 10  She denied any nausea, vomiting confusion, visual disturbance since beyond her baseline, chest pain , shortness of breath, abdominal pain, weakness or syncope patient admits she not take any of her medication today      In presentation in ED her blood pressure was 239/116   This improved after 20 mg of labetalol   Neurosurgery was consulted in the ED  Bernadine Sox was no immediate intervention planned  She was restarted on home regimen for hypertension of losartan 100 mg as well as diltiazem  Blood pressure is improved  She was restarted on diltiazem 480 mg daily which was previous home med ordered by Cardiology  Hydrochlorothiazide 25 mg was also added to her antihypertensive regimen  She was only taking losartan 100 mg daily previously  BMP showed STEFFI she was started on IV fluids  BMP was repeated on day of discharge an STEFFI had resolved  Fluids were discontinued She was seen by Neurosurgery and no intervention was planned recommended medical management and follow-up outpatient since aneurysm was stable compared to prior imaging  [facility] she had them at discharge  Medication compliance was stressed as well as follow up with Neurology and PCP and Cardiology  At discharge she was stable and agreement with plan  Her headache had improved on Tylenol  All her questions were answered  Case Management organized a ride home for her  At discharge her med rec was updated in is current      Patient had episode of dizziness/lightheadedness as well as right lip twitching and left pinky cramping while getting dressed to leave  Dr Violetta Lopez with neurology evaluated patient at the time, exam was suggestive of hyperreflexia, no need for stroke alert at this time  Patient seen and examined at bedside, neurologic exam unremarkable with no deficits  No other events like this after 1st episode  Complications:  None    Discharge Diagnosis:  Hypertensive urgency, STEFFI    Exam on Day of Discharge:   Physical Exam   Constitutional: She is oriented to person, place, and time  No distress  Cardiovascular: Normal rate, regular rhythm and normal heart sounds  Pulmonary/Chest: Effort normal and breath sounds normal    Neurological: She is alert and oriented to person, place, and time  No cranial nerve deficit  Skin: She is not diaphoretic  Vitals reviewed  Condition at Discharge: good     Discharge instructions/Information to patient and family:   See after visit summary for information provided to patient and family        Provisions for Follow-Up Care:  See after visit summary for information related to follow-up care and any pertinent home health orders  Disposition: Home    Planned Readmission: No    Discharge Statement   I spent 45 minutes discharging the patient  This time was spent on the day of discharge  I had direct contact with the patient on the day of discharge  Additional documentation is required if more than 30 minutes were spent on discharge  Discharge Medications:  See after visit summary for reconciled discharge medications provided to patient and family  Medication changes made with this admission:    New antihypertensive regimen:   Will losartan 100 mg daily, hydrochlorothiazide 25 mg daily, diltiazem for 480 mg daily    MD Jarad Beepa U  2  PGY2  2/12/2020  2:25 PM

## 2020-02-12 NOTE — PLAN OF CARE
Problem: Potential for Falls  Goal: Patient will remain free of falls  Description  INTERVENTIONS:  - Assess patient frequently for physical needs  -  Identify cognitive and physical deficits and behaviors that affect risk of falls    -  [place] fall precautions as indicated by assessment   - Educate patient/family on patient safety including physical limitations  - Instruct patient to call for assistance with activity based on assessment  - Modify environment to reduce risk of injury  - Consider OT/PT consult to assist with strengthening/mobility  2/12/2020 1534 by Lenka Adkins RN  Outcome: Completed  2/12/2020 1532 by Lenka Adkins RN  Outcome: Progressing     Problem: PAIN - ADULT  Goal: Verbalizes/displays adequate comfort level or baseline comfort level  Description  Interventions:  - Encourage patient to monitor pain and request assistance  - Assess pain using appropriate pain scale  - Administer analgesics based on type and severity of pain and evaluate response  - Implement non-pharmacological measures as appropriate and evaluate response  - Consider cultural and social influences on pain and pain management  - Notify physician/advanced practitioner if interventions unsuccessful or patient reports new pain  2/12/2020 1534 by Lenka Adkins RN  Outcome: Completed  2/12/2020 1532 by Lenka Adkins RN  Outcome: Progressing     Problem: INFECTION - ADULT  Goal: Absence or prevention of progression during hospitalization  Description  INTERVENTIONS:  - Assess and monitor for signs and symptoms of infection  - Monitor lab/diagnostic results  - Monitor all insertion sites, i e  indwelling lines, tubes, and drains  - [place] appropriate cooling/warming therapies per order  - Administer medications as ordered  - Instruct and encourage patient and family to use good hand hygiene technique  - Identify and instruct in appropriate isolation precautions for identified infection/condition   2/12/2020 1534 by Kayt Hammond RN  Outcome: Completed  2/12/2020 1532 by Katy Hammond RN  Outcome: Progressing  Goal: Absence of fever/infection during neutropenic period  Description  INTERVENTIONS:  - Monitor WBC    2/12/2020 1534 by Katy Hammond RN  Outcome: Completed  2/12/2020 1532 by Katy Hammond RN  Outcome: Progressing     Problem: SAFETY ADULT  Goal: Maintain or return to baseline ADL function  Description  INTERVENTIONS:  -  Assess patient's ability to carry out ADLs; assess patient's baseline for ADL function and identify physical deficits which impact ability to perform ADLs (bathing, care of mouth/teeth, toileting, grooming, dressing, etc )  - Assess/evaluate cause of self-care deficits   - Assess range of motion  - Assess patient's mobility; develop plan if impaired  - Assess patient's need for assistive devices and provide as appropriate  - Encourage maximum independence but intervene and supervise when necessary  - Involve family in performance of ADLs  - Assess for home care needs following discharge   - Consider OT consult to assist with ADL evaluation and planning for discharge  - Provide patient education as appropriate  2/12/2020 1534 by Katy Hammond RN  Outcome: Completed  2/12/2020 1532 by Katy Hammond RN  Outcome: Progressing  Goal: Maintain or return mobility status to optimal level  Description  INTERVENTIONS:  - Assess patient's baseline mobility status (ambulation, transfers, stairs, etc )    - Identify cognitive and physical deficits and behaviors that affect mobility  - Identify mobility aids required to assist with transfers and/or ambulation (gait belt, sit-to-stand, lift, walker, cane, etc )  - [place] fall precautions as indicated by assessment  - Record patient progress and toleration of activity level on Mobility SBAR; progress patient to next Phase/Stage  - Instruct patient to call for assistance with activity based on assessment  - Consider rehabilitation consult to assist with strengthening/weightbearing, etc   2/12/2020 1534 by Gale Whitt RN  Outcome: Completed  2/12/2020 1532 by Gale Whitt RN  Outcome: Progressing     Problem: DISCHARGE PLANNING  Goal: Discharge to home or other facility with appropriate resources  Description  INTERVENTIONS:  - Identify barriers to discharge w/patient and caregiver  - Arrange for needed discharge resources and transportation as appropriate  - Identify discharge learning needs (meds, wound care, etc )  - Arrange for interpretive services to assist at discharge as needed  - Refer to Case Management Department for coordinating discharge planning if the patient needs post-hospital services based on physician/advanced practitioner order or complex needs related to functional status, cognitive ability, or social support system  2/12/2020 1534 by Gale Whitt RN  Outcome: Completed  2/12/2020 1532 by Gale Whitt RN  Outcome: Progressing     Problem: Knowledge Deficit  Goal: Patient/family/caregiver demonstrates understanding of disease process, treatment plan, medications, and discharge instructions  Description  Complete learning assessment and assess knowledge base    Interventions:  - Provide teaching at level of understanding  - Provide teaching via preferred learning methods  2/12/2020 1534 by Gale Whitt RN  Outcome: Completed  2/12/2020 1532 by Gale hWitt RN  Outcome: Progressing     Problem: NEUROSENSORY - ADULT  Goal: Achieves stable or improved neurological status  Description  INTERVENTIONS  - Monitor and report changes in neurological status  - Monitor vital signs such as temperature, blood pressure, glucose, and any other labs ordered   - Monitor for seizure activity and implement precautions if appropriate       2/12/2020 1534 by Gale Whitt RN  Outcome: Completed  2/12/2020 1532 by Gale Whitt RN  Outcome: Progressing  Goal: Achieves maximal functionality and self care  Description  INTERVENTIONS  - Monitor swallowing and airway patency with patient fatigue and changes in neurological status  - Encourage and assist patient to increase activity and self care     - Encourage visually impaired, hearing impaired and aphasic patients to use assistive/communication devices  2/12/2020 1534 by Augusta Vu RN  Outcome: Completed  2/12/2020 1532 by Augusta Vu RN  Outcome: Progressing     Problem: CARDIOVASCULAR - ADULT  Goal: Maintains optimal cardiac output and hemodynamic stability  Description  INTERVENTIONS:  - Monitor I/O, vital signs and rhythm  - Monitor for S/S and trends of decreased cardiac output  - Administer and titrate ordered vasoactive medications to optimize hemodynamic stability  - Assess quality of pulses, skin color and temperature  - Assess for signs of decreased coronary artery perfusion  - Instruct patient to report change in severity of symptoms  2/12/2020 1534 by Augusta Vu RN  Outcome: Completed  2/12/2020 1532 by Augusta Vu RN  Outcome: Progressing

## 2020-02-12 NOTE — PLAN OF CARE
Problem: Potential for Falls  Goal: Patient will remain free of falls  Description  INTERVENTIONS:  - Assess patient frequently for physical needs  -  Identify cognitive and physical deficits and behaviors that affect risk of falls    -  [place] fall precautions as indicated by assessment   - Educate patient/family on patient safety including physical limitations  - Instruct patient to call for assistance with activity based on assessment  - Modify environment to reduce risk of injury  - Consider OT/PT consult to assist with strengthening/mobility  Outcome: Progressing     Problem: PAIN - ADULT  Goal: Verbalizes/displays adequate comfort level or baseline comfort level  Description  Interventions:  - Encourage patient to monitor pain and request assistance  - Assess pain using appropriate pain scale  - Administer analgesics based on type and severity of pain and evaluate response  - Implement non-pharmacological measures as appropriate and evaluate response  - Consider cultural and social influences on pain and pain management  - Notify physician/advanced practitioner if interventions unsuccessful or patient reports new pain  Outcome: Progressing     Problem: INFECTION - ADULT  Goal: Absence or prevention of progression during hospitalization  Description  INTERVENTIONS:  - Assess and monitor for signs and symptoms of infection  - Monitor lab/diagnostic results  - Monitor all insertion sites, i e  indwelling lines, tubes, and drains  - [place] appropriate cooling/warming therapies per order  - Administer medications as ordered  - Instruct and encourage patient and family to use good hand hygiene technique  - Identify and instruct in appropriate isolation precautions for identified infection/condition   Outcome: Progressing  Goal: Absence of fever/infection during neutropenic period  Description  INTERVENTIONS:  - Monitor WBC    Outcome: Progressing     Problem: SAFETY ADULT  Goal: Maintain or return to baseline ADL function  Description  INTERVENTIONS:  -  Assess patient's ability to carry out ADLs; assess patient's baseline for ADL function and identify physical deficits which impact ability to perform ADLs (bathing, care of mouth/teeth, toileting, grooming, dressing, etc )  - Assess/evaluate cause of self-care deficits   - Assess range of motion  - Assess patient's mobility; develop plan if impaired  - Assess patient's need for assistive devices and provide as appropriate  - Encourage maximum independence but intervene and supervise when necessary  - Involve family in performance of ADLs  - Assess for home care needs following discharge   - Consider OT consult to assist with ADL evaluation and planning for discharge  - Provide patient education as appropriate  Outcome: Progressing  Goal: Maintain or return mobility status to optimal level  Description  INTERVENTIONS:  - Assess patient's baseline mobility status (ambulation, transfers, stairs, etc )    - Identify cognitive and physical deficits and behaviors that affect mobility  - Identify mobility aids required to assist with transfers and/or ambulation (gait belt, sit-to-stand, lift, walker, cane, etc )  - [place] fall precautions as indicated by assessment  - Record patient progress and toleration of activity level on Mobility SBAR; progress patient to next Phase/Stage  - Instruct patient to call for assistance with activity based on assessment  - Consider rehabilitation consult to assist with strengthening/weightbearing, etc   Outcome: Progressing     Problem: DISCHARGE PLANNING  Goal: Discharge to home or other facility with appropriate resources  Description  INTERVENTIONS:  - Identify barriers to discharge w/patient and caregiver  - Arrange for needed discharge resources and transportation as appropriate  - Identify discharge learning needs (meds, wound care, etc )  - Arrange for interpretive services to assist at discharge as needed  - Refer to Case Management Department for coordinating discharge planning if the patient needs post-hospital services based on physician/advanced practitioner order or complex needs related to functional status, cognitive ability, or social support system  Outcome: Progressing     Problem: Knowledge Deficit  Goal: Patient/family/caregiver demonstrates understanding of disease process, treatment plan, medications, and discharge instructions  Description  Complete learning assessment and assess knowledge base  Interventions:  - Provide teaching at level of understanding  - Provide teaching via preferred learning methods  Outcome: Progressing     Problem: NEUROSENSORY - ADULT  Goal: Achieves stable or improved neurological status  Description  INTERVENTIONS  - Monitor and report changes in neurological status  - Monitor vital signs such as temperature, blood pressure, glucose, and any other labs ordered   - Monitor for seizure activity and implement precautions if appropriate       Outcome: Progressing  Goal: Achieves maximal functionality and self care  Description  INTERVENTIONS  - Monitor swallowing and airway patency with patient fatigue and changes in neurological status  - Encourage and assist patient to increase activity and self care     - Encourage visually impaired, hearing impaired and aphasic patients to use assistive/communication devices  Outcome: Progressing     Problem: CARDIOVASCULAR - ADULT  Goal: Maintains optimal cardiac output and hemodynamic stability  Description  INTERVENTIONS:  - Monitor I/O, vital signs and rhythm  - Monitor for S/S and trends of decreased cardiac output  - Administer and titrate ordered vasoactive medications to optimize hemodynamic stability  - Assess quality of pulses, skin color and temperature  - Assess for signs of decreased coronary artery perfusion  - Instruct patient to report change in severity of symptoms  Outcome: Progressing

## 2020-02-12 NOTE — UTILIZATION REVIEW
Continued Stay Review    Date:  2-12-20                     Current Patient Class: inpatient  Current Level of Care: medical surgical     HPI:59 y o  female initially admitted on  2-10-20  For left cavernous carotid aneurysm  Assessment/Plan: At 7:21 pm 2-11 patient had twitching of her right eyelid and L cheek   Evaluated and not believed to be stroke or seizure symptoms  Likely hyperreflexia per Neurology  She was given iv  9% ns 125/hr for creatinine of 1 39 above baseline 0/7 to 0 9   BP elevated to 997B systolic  Iv Labetalol given x1  Blood sugars  [phone] requiring 42 units of insulin over past 24 hours  New insulin orders  45 units q am     Cardizem increased to 480 mg daily  PT and OT evaluations recommend home with outpatient therapy and family support      Addendum  No new orders      Pertinent Labs/Diagnostic Results:       Results from last 7 days   Lab Units 02/11/20  0921 02/10/20  1142   WBC Thousand/uL 9 71 10 93*   HEMOGLOBIN g/dL 10 7* 11 2*   HEMATOCRIT % 35 8 37 6   PLATELETS Thousands/uL 378 378   NEUTROS ABS Thousands/µL 5 61 7 74*         Results from last 7 days   Lab Units 02/12/20  0600 02/11/20  1902 02/10/20  1142   SODIUM mmol/L 140 132* 139   POTASSIUM mmol/L 3 3* 4 7 3 5   CHLORIDE mmol/L 106 95* 102   CO2 mmol/L 29 29 30   ANION GAP mmol/L 5 8 7   BUN mg/dL 22 17 14   CREATININE mg/dL 0 83 1 39* 0 91   EGFR ml/min/1 73sq m 88 47 79   CALCIUM mg/dL 9 2 9 9 9 5   MAGNESIUM mg/dL  --  2 1  --    PHOSPHORUS mg/dL  --  3 9  --      Results from last 7 days   Lab Units 02/12/20  0600 02/11/20  1902 02/10/20  1142   AST U/L 8 23 12   ALT U/L 15 23 19   ALK PHOS U/L 121* 149* 152*   TOTAL PROTEIN g/dL 6 8 8 4* 7 8   ALBUMIN g/dL 2 6* 3 3* 3 0*   TOTAL BILIRUBIN mg/dL 0 31 0 50 0 28     Results from last 7 days   Lab Units 02/12/20  0644 02/12/20  0337 02/12/20  0051 02/11/20  2048 02/11/20  1825 02/11/20  1607 02/11/20  1153 02/11/20  6935 02/10/20  2131 02/10/20  1716   POC GLUCOSE mg/dl 105 135 201* 398* 491* 464* 371* 300* 379* 472*     Results from last 7 days   Lab Units 02/12/20  0600 02/11/20  1902 02/10/20  1142   GLUCOSE RANDOM mg/dL 103 524* 261*     Results from last 7 days   Lab Units 02/10/20  2030 02/10/20  1718 02/10/20  1142   TROPONIN I ng/mL 0 02 0 02 <0 02       Vital Signs:     Vitals:    02/11/20 1528 02/11/20 1700 02/11/20 1910 02/12/20 0731   BP: (!) 177/101 (!) 172/97 154/87 (!) 142/116   Pulse: 89 (!) 109 84 73   Resp: 16  16    Temp: 98 3 °F (36 8 °C)   98 1 °F (36 7 °C)   TempSrc:       SpO2: 92%  98% 95%   Weight:       Height:         Medications:   Scheduled Medications:    Medications:  acetaminophen 650 mg Oral Q6H RONI   atorvastatin 40 mg Oral Daily   budesonide-formoterol 2 puff Inhalation Daily   diltiazem 480 mg Oral Daily   gabapentin 100 mg Oral BID   insulin glargine 45 Units Subcutaneous QAM   insulin glargine 45 Units Subcutaneous HS   insulin lispro 2-12 Units Subcutaneous TID AC   insulin lispro 25 Units Subcutaneous TID With Meals   insulin lispro 4-20 Units Subcutaneous HS   insulin lispro 4-20 Units Subcutaneous 0200   Labetalol HCl 10 mg Intravenous Once   losartan 100 mg Oral Daily   predniSONE 10 mg Oral Daily     Continuous IV Infusions:    sodium chloride 125 mL/hr Intravenous Continuous     PRN Meds:    albuterol 2 puff Inhalation Q6H PRN       Discharge Plan: To be determined     Network Utilization Review Department  [email or web address]  ATTENTION: Please call with any questions or concerns to [phone] and carefully listen to the prompts so that you are directed to the right person  All voicemails are confidential   Radha Gonzalez all requests for admission clinical reviews, approved or denied determinations and any other requests to dedicated fax number below belonging to the campus where the patient is receiving treatment   List of dedicated fax numbers for the Facilities:  [address] FAX NUMBER   ADMISSION DENIALS (Administrative/Medical Necessity) [address] (Maternity/NICU/Pediatrics) [phone]     [facility] [place] [phone]   Sia Demetrius [phone]   [address] [place] [address] [phone]   [address] [phone]   [address] [facility] [phone]   [address]  [address] [facility] [address] [facility] [phone]

## 2020-02-12 NOTE — QUICK NOTE
Late entry-     Patient had episode of dizziness/lightheadedness as well as right lip twitching and left pinky cramping while getting dressed to leave  Dr Galvin Krabbe with neurology evaluated patient at the time, exam was suggestive of hyperreflexia, no need for stroke alert at this time  Patient seen and examined at bedside, neurologic exam unremarkable with no deficits  Will check electrolytes, calcium and continue to monitor neuro exam closely  Neuro checks every 4 hours  BP elevated 021J systolic, IV labetalol 10 mg ordered however repeat was 154/87 without intervention  CMP, Mg, Phos ordered  Of note, losartan 100 mg daily, hydrochlorothiazide 25 mg and diltiazem 480 mg given today  Hydrochlorothiazide discontinued, currently on diltiazem 480 mg daily and losartan 100 mg daily  Will continue to monitor closely  Glucose noted to be uncontrolled, received 42 units SSI over past 24 hours, 25 units lispro with dinner  Glucose following dinner 491->524->398  Will give 15 units regular insulin, recheck glucose 1 hour following  Continue SSI with bedtime and 2 AM checks, increase Lantus to 45 units BID  Patient reassessed following glucose 398, alert and oriented x3 and asymptomatic, no complaints, normal mentation  CMP reviewed, creatinine 1 39 with BL 0 7-0 9, consistent with STEFFI, likely osmotic diuresis secondary to hyperglycemia  Will give 1L NS with IVF hydration 125 mL/hr following, monitor urine output   Recheck BMP in AM      Discussed with attending physician Dr Jim Noble

## 2020-02-12 NOTE — QUICK NOTE
Was informed by nursing that as patient was getting ready to leave she began to experienced R lip twitching and spasm  She also noted cramping and spasming of her L 5th finger  Dr Chayo Arreola was able to see patient and evaluate her  He did not believe her symptoms were due to a stroke and seemed to be more of a hyperreactive response  Dr Lupis Higgins and myself evaluated the patient with her spouse in the room  Neuro exam was unremarkable, cranial nerves intact  She did experience twitching of her R eyelid and L cheek during examination  Due to these new findings, we will continue to monitor patient overnight and reassess as needed      Follow-up BMP, Mg, Phos

## 2020-02-13 ENCOUNTER — TRANSITIONAL CARE MANAGEMENT (OUTPATIENT)
Dept: FAMILY MEDICINE CLINIC | Facility: CLINIC | Age: 60
End: 2020-02-13

## 2020-02-13 ENCOUNTER — TELEPHONE (OUTPATIENT)
Dept: NEUROSURGERY | Facility: CLINIC | Age: 60
End: 2020-02-13

## 2020-02-13 NOTE — TELEPHONE ENCOUNTER
02/13/2020-CALLED PT, SCHEDULED  APT W/ED & DR HAIR ON 03/18/2020       ----- Message from Israel Victor PA-C sent at 2/11/2020  5:14 PM EST -----  Patient needs clinical follow-up in 1 month with Dr Randon Fabry or [facility]  Thank you!

## 2020-02-13 NOTE — PROGRESS NOTES
CHW spoke w Yumiko Weathers to f/u on in-person assessment with Alissa Weathers indicated she was hospitalized for 2 days due to high blood pressure, and was just released yesterday  CHW provided Yumiko Weathers w/phone number for [place] and informed Yumiko Weathers if I didn't hear from her by Fri, 2/14, [facility] prior to contacting her

## 2020-02-14 ENCOUNTER — PATIENT OUTREACH (OUTPATIENT)
Dept: FAMILY MEDICINE CLINIC | Facility: CLINIC | Age: 60
End: 2020-02-14

## 2020-02-14 NOTE — PROGRESS NOTES
CHw received call from Se [facility] indicating Cristopher Deepthilupe in-person assessment is scheduled for Fri, 2/21  CHW informed Lorren Gosselin would f/u with Lynette Cole on 2/25 and then Se Pulido with an update on transportation services  Provided additional supporting counseling

## 2020-02-18 NOTE — PROGRESS NOTES
Assessment and Plan:   Ms Daniel Novoa a 13-year-old  female with history significant for polymyalgia rheumatica, insulin-dependent diabetes mellitus, with complications related to retinopathy causing legal blindness bilaterally, asthma, osteoarthritis, microcytic anemia of unclear etiology and morbid obesity, who presents for follow up of an episode involving bilateral arm pain/stiffness and swelling, associated with significantly elevated inflammatory markers with a CRP greater than 90 and an ESR of greater than 130, thought to be secondary to PMR   She is currently on prednisone 10 mg once daily       # Polymyalgia rheumatica  - Edie presents today for follow-up of significantly elevated inflammatory markers, as well as abrupt onset of symptoms related to bilateral upper extremity pain/stiffness/swelling, with mild symptoms of pain noted in her pelvic girdle region   Other than persistent headaches also experienced over the past 1 year, she does not complain of additional concerning symptoms on her review of systems   Her physical examination has also not been revealing, and further evaluation such as autoimmune serologies, a bilateral temporal artery ultrasound with temporal artery biopsies, and a CT of her chest/abdomen and pelvis have all been unrevealing   Her overall presentation was thought to be consistent with polymyalgia rheumatica  She states after few days of taking the initial steroids she noticed a significant improvement in her overall joint pains, swelling and stiffness, and has not had any major recurrences of the symptoms, except for with doses nearing prednisone 10 mg daily     - She reports with doses lower than 15 mg once daily, especially at 10 mg once daily of the prednisone she has noticed a slight recurrence of pain affecting her shoulders predominantly  She states that with exercise she is still able to overcome the symptoms    We briefly discussed increasing the dose slightly to 12 5 mg once daily, but I do not think that this is a great idea due to the significantly uncontrolled blood sugar readings she has been having  She is agreeable with this, and in fact would like to continue a steroid taper  We will decrease further to 9 mg once daily, and I requested she stay on this dose until her follow-up visit with me in 6 weeks  I requested she contact the office if she is to experience a significant change in her symptoms  If we continue to notice difficulties with the prednisone taper I did discuss starting steroid sparing agents especially given the history of insulin-dependent diabetes, and initiating medications such as methotrexate or Actemra  - Her inflammatory markers will be repeated today  Makenzie Iglesias will follow up with Neurology for the persistent headaches, but so far the evaluation has not revealed a rheumatic etiology for the chronic headaches      - I will see her back in the office in 6 weeks to review her response to the prednisone   I advised her to call with any concerns      # Chronic steroid use  - She is aware of the side effects associated with chronic steroid use including but not limited to, risk for infections, cataracts/glaucoma, hypertension, worsening diabetic control, gastritis, osteoporosis and avascular necrosis   I advised her to continue daily calcium and vitamin-D supplements   She did undergo a recent DEXA scan in January 2020 which was normal        Plan:  Diagnoses and all orders for this visit:    Polymyalgia rheumatica ([address] )  -     C-reactive protein; Future  -     Sedimentation rate, automated; Future  -     Quantiferon TB Gold Plus; Future  -     predniSONE 1 mg tablet; Combine to take 9 mg daily  Current chronic use of systemic steroids  -     Quantiferon TB Gold Plus;  Future    Elevated sed rate    Elevated C-reactive protein (CRP)    IDDM (insulin dependent diabetes mellitus) (HCC)    Chronic nonintractable headache, unspecified headache type    Class 3 severe obesity with body mass index (BMI) of 45 0 to 49 9 in adult, unspecified obesity type, unspecified whether serious comorbidity present ([address] )    Other orders  -     glucose blood test strip; Contour Next Test Strips      Activities as tolerated    Diet: low carb/low fat, more greens/vegetables, adequate hydration  Exercise: try to maintain a low impact exercise regimen as much as possible  Walk for 30 minutes a day for at least 3 days a week    Encouraged to maintain good sleep hygiene  Continue other medications as prescribed by PCP and other specialists        RTC in 6 weeks          HPI    INITIAL VISIT NOTE:  Ms Karen Zamora a 77-year-old  female with history significant for insulin-dependent diabetes mellitus, with complications related to retinopathy causing legal blindness bilaterally, asthma, osteoarthritis, microcytic anemia of unclear etiology and morbid obesity, who presents for further evaluation of an episode involving bilateral arm pain/stiffness and swelling, associated with significantly elevated inflammatory markers with a CRP greater than 90 and an ESR of greater than 130   She is referred by Dr Aviva Suresh a rheumatology consult      Patient reports in the first week of September 2019, she woke up one morning with sudden onset of pain and stiffness affecting her bilateral arms, to such an extent that she was unable to move them even slightly   This was also associated with a weakness sensation of her lower legs   As her symptoms did not improve within the week she was seen in the emergency room and admitted for further evaluation   Due to the visible swelling an x-ray of her hand and forearm were initially done which only showed dorsal soft tissue swelling   An upper extremity venous duplex ruled out evidence of a deep vein thrombosis   A CTA of her right upper extremity was essentially unremarkable except for edema noted in the subcutaneous fat of the distal forearm and hand although without a vascular etiology   The aortic arch and distal arteries were unremarkable      Further lab workup showed a leukocytosis of 14, with a chronic microcytic anemia with stable hemoglobin of 9, as well as thrombocytosis with platelets ranging from 500-700   Mild transaminitis was seen which eventually resolved, although she continued to have an elevated alkaline phosphatase which is still persistent   An ESR was found to be elevated at greater than 130, with a C reactive protein of greater than 90   An JUAN screen was positive at 1:80 homogeneous pattern   A hepatitis C antibody was found to be equivocal   An anti cardiolipin IgM antibody was mildly elevated at 13   TSH, blood cultures, uric acid, urinalysis, CK, rheumatoid factor, anti CCP antibody, anti neutrophilic cytoplasmic antibody, lupus anticoagulant, Lyme disease PCR, Sjogren's antibodies, SPEP, total complement, beta 2 glycoprotein antibodies, paraneoplastic profile, anti double-stranded DNA antibody, LDH, beta 2 microglobulin, immunoglobulin assay, and direct Gerri test were unremarkable      She reports during the hospital stay she declined the use of steroids due to the insulin-dependent diabetes, or pain medication use   Her symptoms were managed with Tylenol as needed, as well as therapy   She was also evaluated by vascular surgery during her hospital stay, with the workup being unrevealing   She was discharged home and advised to follow-up with Rheumatology for further evaluation   There was a concern for temporal arteritis versus polymyalgia rheumatica given the abrupt onset of her symptoms as well as the elevated inflammatory markers   She reports the majority of her symptoms have since resolved, and lasted for an entire duration of approximately 2 weeks   She did have follow-up labs done on September 30th which showed a persistently elevated ESR of 121, but with the down trending CRP of 23 2      Patient reports since December 2018 she has been experiencing new onset headaches which were diagnosed as migraine headaches by Neurology  Tuliofransico Moore states that the headache is sometimes felt at the back of her head or over her entire head, but is not usually limited to one side   She was recently seen in the emergency room for the headaches as well and no acute findings were noted on an MRI brain are MRA head   The MRI of her brain did show cerebral atrophy more than expected for the patient's age  Louise Galla were also changes that could be consistent with mild chronic white matter microangiopathy versus sequelae of chronic migraine disease   A CTA head and neck was also unremarkable      She reports a few months ago her weight was 336 lb, and today she is presenting with a weight of 304 lb  Chelsea Moore thinks this may be related to a decreased appetite   When her symptoms started at the beginning of September she did experience jaw pain on the right side which self-resolved   She does report a history of multiple miscarriages   She denies fevers, night sweats, scalp tenderness/pain, new vision changes (she does have a history of retinopathy related to insulin-dependent diabetes mellitus, and states she is legally blind in both eyes), current jaw claudication, hair loss, sicca symptoms, unexplained skin rashes, psoriasis, photosensitivity, mouth/nose ulcers, epistaxis, recurrent sinus disease, swollen glands, pleuritic chest pain, hemoptysis (she does have a chronic history of shortness of breath and cough related to asthma), abdominal pain, vomiting, diarrhea, blood in stools, blood clots, Raynaud's, focal weakness or family history of autoimmune disease   Her sister may have some type of arthritis      She is up-to-date with a mammogram, and states Pap smears were discontinued as she has had a hysterectomy  Marquislupe Moore has never had a colonoscopy         11/6/2019:  Patient presents for a follow-up today   We reviewed the ultrasound done of her bilateral temporal arteries which was unrevealing   A bilateral temporal artery biopsy did not show any inflammatory changes   Her ESR and CRP were still elevated at 95 and 20 7, respectively   Her hemoglobin was stable at 9 3   Her anti smooth muscle antibody was mildly elevated at 25   Her C3 and C4 were also mildly elevated   The remainder of the CBC, CMP (except for the alkaline phosphatase), SPEP, immunofixation, CK, aldolase, HCV RNA, HIV, ferritin, anti mitochondrial antibody, urinalysis and urine protein creatinine ratio were unremarkable      She reports since the last office visit she has had approximately 4 flare-ups of pain and stiffness affecting her predominantly from the bilateral elbows up to her shoulders   She states that this has been occurring on a weekly basis, but it does take days to resolve and overlaps with the next flare she has  Sandoval Alanis recalls in the past she has also experienced pain in her bilateral hip region, but states currently her predominant symptoms have been occurring from the elbows upwards  Frank Stagers on occasion will she experience a discomfort from her wrists radiating into her hands  Sandoval Alanis denies any new complaints of joint pains or swelling      Since the last office visit she has also been experiencing increasing redness of her bilateral eyes   She did see her ophthalmologist a few weeks ago and apparently there was no inflammation detected  Sandoval Alanis is scheduled for a follow-up on November 20th      She has also been having worsening headaches which she feels in a bandlike fashion across her head  Sandoval Alanis has never been evaluated by Neurology         12/4/2019:  Patient presents for a follow-up of polymyalgia rheumatica  Sandoval Alanis is currently on oral prednisone 20 mg once daily   She did not have a chance to do her labs following the prior office visit      She reports a few days after starting the prednisone at 20 mg once daily she started to notice a significant improvement in her joint pains and swelling   She states it has also temporarily been helping with the headaches as well as the vision changes and redness of her eyes   As far she is aware she has never been told of an inflammatory eye disorder by her ophthalmologist  Alek Gómez reports with activities such as vacuuming and cleaning the house this has also been helping with her joint pain, but the improvement has all been amplified after starting the prednisone   Symptomatically she has been tolerating the steroids well, but on a few occasions has noticed blood sugar readings in the 400s to 500s   She has not contacted her endocrinologist with this information, and they are not aware that she has been started on steroids   She overall denies any joint pains, swelling or stiffness, but states on occasion with changes in the weather and the cooler temperatures she may feel some achiness      She continues to experience the headaches, and is scheduled for a neurology evaluation in February 2020   She was recently seen by Hematology and her lab abnormalities were all thought to be related to a reactive process   No other complaints noted at this time         1/8/2020:  Patient presents for a follow-up of polymyalgia rheumatica  She is currently on oral prednisone 15 mg once daily  I reviewed her labs done yesterday which showed an ESR of 59 which had improved, but her C reactive protein continues to be elevated at 38  5      At the last office visit we had decreased her prednisone dose from 20-15 mg once daily  She mentions that there has been no significant change in her symptoms with doing so and she is tolerating the steroid taper well  Dependent on the weather she may have some achiness around her shoulder region, but otherwise denies any significant flare-ups of joint pain, swelling or stiffness  She is tolerating the prednisone well as well    She continues to check her blood sugars 3-4 times daily, and is in touch with her endocrinologist regarding the hyperglycemia  They did increase her insulin regimen      She will be following up with her ophthalmologist at the end of January  She continues to experience the eye redness and blurred vision  She has an appointment with Neurology next month as she continues to experience the headaches  2/19/2020:  Patient presents for follow-up of polymyalgia rheumatica  She is currently on prednisone 10 mg once daily  At the last office visit she was on 15 mg once daily, and we had discussed tapering by 2 5 mg every 2 weeks  She states at a dose of 12 5 mg once daily she started to notice a slight recurrence of pain mostly affecting her shoulders and right wrist, but this change worsened even further when she decreased to 10 mg once daily  She has been on this dose for approximately 2 weeks now  She states that she is still able to exercise and work through the pain in her shoulders, but it has also affected her right wrist and very occasionally in her bilateral groin region  She has noticed mild swelling of her right shoulder without any other swollen joints  She is not really experiencing morning stiffness  She has had issues with significantly high blood sugar readings occasionally greater than 600  She is working closely with endocrinology to adjust her insulin regimen  In view of this she is very hesitant to increase her dose of the steroids again  She was recently seen by Neurology in view of the chronic headaches and this is thought to possibly be related to uncontrolled hypertension  The following portions of the patient's history were reviewed and updated as appropriate: allergies, current medications, past family history, past medical history, past social history, past surgical history and problem list       Review of Systems  Constitutional: Negative for weight change, fevers, chills, night sweats  Positive for fatigue    ENT/Mouth: Negative for hearing changes, ear pain, nasal congestion, sinus pain, hoarseness, sore throat, rhinorrhea, swallowing difficulty  Eyes:  Positive for pain, redness, vision changes  Cardiovascular: Negative for chest pain, palpitations  Respiratory: Negative for cough, sputum, wheezing  Positive for shortness of breath  Gastrointestinal: Negative for nausea, vomiting, diarrhea, constipation, pain, heartburn  Genitourinary: Negative for dysuria, urinary frequency, hematuria  Musculoskeletal: As per HPI  Skin: Negative for skin rash, color changes  Positive for hair loss  Neuro: Negative for tingling, loss of consciousness  Positive for dizziness, headaches, lightheadedness, numbness and weakness  Psych: Negative for anxiety, depression  Heme/Lymph: Negative for easy bruising, bleeding, lymphadenopathy          Past Medical History:   Diagnosis Date    Anemia     Arthritis     Dyspnea on exertion     Hyperlipidemia     Hypertension     Legally blind 2010    Mild intermittent asthma with exacerbation 8/4/2018    Miscarriage     x 3    Seizures (HCC)     Sickle cell trait ([facility] [address] [facility] )     Sleep apnea        Past Surgical History:   Procedure Laterality Date    CATARACT EXTRACTION Bilateral     august and september    EYE SURGERY      HYSTERECTOMY      37    OOPHORECTOMY Left     37    MD TEMPORAL ARTERY LIGATN OR BX Bilateral 10/17/2019    Procedure: BIOPSY ARTERY TEMPORAL;  Surgeon: Yulia Lacey DO;  Location: [facility];  Service: Vascular       Social History     Socioeconomic History    Marital status: /Civil Union     Spouse name: Not on file    Number of children: Not on file    Years of education: Not on file    Highest education level: Not on file   Occupational History    Occupation: employed   Social Needs    Financial resource strain: Not very hard    Food insecurity:     Worry: Sometimes true     Inability: Sometimes true   [employer] needs:     Medical: No     Non-medical: No   Tobacco Use    Smoking status: Never Smoker    Smokeless tobacco: Never Used   Substance and Sexual Activity    Alcohol use: Never     Alcohol/week: 0 0 standard drinks     Frequency: Never     Drinks per session: Patient refused     Binge frequency: Never    Drug use: No    Sexual activity: Not Currently     Partners: Male     Birth control/protection: None   Lifestyle    Physical activity:     Days per week: 0 days     Minutes per session: 0 min    Stress:  Only a little   Relationships    Social connections:     Talks on phone: Twice a week     Gets together: Twice a week     Attends [employer] service: Not on file     Active member of club or organization: Not on file     Attends meetings of clubs or organizations: Not on file     Relationship status:     Intimate partner violence:     Fear of current or ex partner: Not on file     Emotionally abused: Not on file     Physically abused: Not on file     Forced sexual activity: Not on file   Other Topics Concern    Not on file   Social History Narrative    Caffeine use    Resides with spouse and one son       Family History   Problem Relation Age of Onset    Heart attack Mother     Heart disease Mother     Hypertension Mother     No Known Problems Father     Heart disease Sister     No Known Problems Brother     No Known Problems Son     No Known Problems Daughter     Heart attack Maternal Grandmother     Heart disease Maternal Grandmother     Diabetes Other     Heart attack Other     No Known Problems Sister     No Known Problems Sister     No Known Problems Paternal Aunt     No Known Problems Son     Cancer Neg Hx     Stroke Neg Hx        No Known Allergies      Current Outpatient Medications:     albuterol (PROVENTIL HFA) 90 mcg/act inhaler, Inhale 2 puffs every 6 (six) hours as needed for wheezing For another 24 hours use every 4 hours standing, Disp: 6 7 g, Rfl: 0    aspirin (ADULT ASPIRIN EC LOW STRENGTH) 81 mg EC tablet, Take 81 mg by mouth daily , Disp: , Rfl:     atorvastatin (LIPITOR) 40 mg tablet, TAKE 1 TABLET BY MOUTH EVERY DAY, Disp: 30 tablet, Rfl: 0    BASAGLAR KWIKPEN 100 units/mL injection pen, 45 units in morning, 40 at night, Disp: 5 pen, Rfl: 1    SARAH MICROLET LANCETS lancets, Inject 1 applicator into the skin 4 (four) times a day, Disp: , Rfl:     Blood Glucose Monitoring Suppl (CONTOUR NEXT MONITOR) w/Device KIT, Disp 1 meter dx E11 9, may submitted any contour next meter  If not covered let us know we can change strips, Disp: 1 kit, Rfl: 1    Blood Pressure Monitor KIT, Check blood pressures BID (Patient not taking: Reported on 2/10/2020), Disp: 1 each, Rfl: 0    budesonide-formoterol (SYMBICORT) 80-4 5 MCG/ACT inhaler, Inhale 2 puffs 2 (two) times a day Rinse mouth after use  (Patient taking differently: Inhale 2 puffs as needed Rinse mouth after use ), Disp: 1 Inhaler, Rfl: 5    Cholecalciferol (VITAMIN D3) 3000 units TABS, Take 3,000 Units by mouth daily , Disp: , Rfl:     CONTOUR NEXT TEST test strip, Test blood sugar 3x per day dx E11 9, Disp: 300 each, Rfl: 1    ferrous sulfate 324 (65 Fe) mg, Take 1 tablet (324 mg total) by mouth every other day, Disp: 45 tablet, Rfl: 3    gabapentin (NEURONTIN) 100 mg capsule, Take 1 capsule in am and 2-3 capsules at night, Disp: 90 capsule, Rfl: 0    glucose blood test strip, Contour Next Test Strips, Disp: , Rfl:     HUMALOG KWIKPEN 100 units/mL injection pen, Take 25 units before meals, Disp: 10 pen, Rfl: 3    hydrochlorothiazide (HYDRODIURIL) 25 mg tablet, Take 25 mg by mouth daily , Disp: , Rfl:     Insulin Pen Needle (BD PEN NEEDLE DERRICK U/F) 32G X 4 MM MISC, Inject 1 applicator under the skin 4 (four) times a day, Disp: , Rfl:     losartan (COZAAR) 100 MG tablet, TAKE 1 TABLET BY MOUTH EVERY DAY, Disp: 30 tablet, Rfl: 5    predniSONE 1 mg tablet, Combine to take 9 mg daily  , Disp: 120 tablet, Rfl: 2    predniSONE 5 mg tablet, Take 3 tablets (15 mg total) by mouth daily (Patient taking differently: Take 10 mg by mouth daily ), Disp: 90 tablet, Rfl: 2      Objective:    Vitals:    02/19/20 1318   BP: 162/92   BP Location: Right arm   Patient Position: Sitting   Cuff Size: Extra-Large   Pulse: 87   Weight: 135 kg (297 lb)   Height: 5' 8" (1 727 m)       Physical Exam  General: Well appearing, well nourished, in no distress  Oriented x 3, normal mood and affect  Ambulating with aid of a cane  Obese  Skin: Good turgor, no rash, unusual bruising or prominent lesions  Hair: Normal texture and distribution  Nails: Normal color, no deformities  HEENT:  Head: Normocephalic, atraumatic  Eyes: Conjunctiva clear, sclera non-icteric, EOM intact  Nose: No external lesions, mucosa non-inflamed  Mouth: Mucous membranes moist, no mucosal lesions  Neck: Supple  Extremities: No amputations or deformities, cyanosis, edema  Musculoskeletal:  There is no peripheral joint soft tissue swelling or tenderness noted  Neurologic: Alert and oriented  No focal neurological deficits appreciated  Psychiatric: Normal mood and affect  DOC Tamez    Rheumatology

## 2020-02-19 ENCOUNTER — APPOINTMENT (OUTPATIENT)
Dept: LAB | Facility: CLINIC | Age: 60
End: 2020-02-19
Payer: COMMERCIAL

## 2020-02-19 ENCOUNTER — OFFICE VISIT (OUTPATIENT)
Dept: RHEUMATOLOGY | Facility: CLINIC | Age: 60
End: 2020-02-19
Payer: COMMERCIAL

## 2020-02-19 VITALS
SYSTOLIC BLOOD PRESSURE: 162 MMHG | HEIGHT: 68 IN | HEART RATE: 87 BPM | WEIGHT: 293 LBS | BODY MASS INDEX: 44.41 KG/M2 | DIASTOLIC BLOOD PRESSURE: 92 MMHG

## 2020-02-19 DIAGNOSIS — Z79.52 CURRENT CHRONIC USE OF SYSTEMIC STEROIDS: ICD-10-CM

## 2020-02-19 DIAGNOSIS — M35.3 POLYMYALGIA RHEUMATICA (HCC): ICD-10-CM

## 2020-02-19 DIAGNOSIS — R79.82 ELEVATED C-REACTIVE PROTEIN (CRP): ICD-10-CM

## 2020-02-19 DIAGNOSIS — G89.29 CHRONIC NONINTRACTABLE HEADACHE, UNSPECIFIED HEADACHE TYPE: ICD-10-CM

## 2020-02-19 DIAGNOSIS — E66.01 CLASS 3 SEVERE OBESITY WITH BODY MASS INDEX (BMI) OF 45.0 TO 49.9 IN ADULT, UNSPECIFIED OBESITY TYPE, UNSPECIFIED WHETHER SERIOUS COMORBIDITY PRESENT (HCC): ICD-10-CM

## 2020-02-19 DIAGNOSIS — R51.9 CHRONIC NONINTRACTABLE HEADACHE, UNSPECIFIED HEADACHE TYPE: ICD-10-CM

## 2020-02-19 DIAGNOSIS — R70.0 ELEVATED SED RATE: ICD-10-CM

## 2020-02-19 DIAGNOSIS — M35.3 POLYMYALGIA RHEUMATICA (HCC): Primary | ICD-10-CM

## 2020-02-19 DIAGNOSIS — IMO0001 IDDM (INSULIN DEPENDENT DIABETES MELLITUS): ICD-10-CM

## 2020-02-19 LAB
CRP SERPL QL: 20.9 MG/L
ERYTHROCYTE [SEDIMENTATION RATE] IN BLOOD: 51 MM/HOUR (ref 0–20)

## 2020-02-19 PROCEDURE — 3008F BODY MASS INDEX DOCD: CPT | Performed by: INTERNAL MEDICINE

## 2020-02-19 PROCEDURE — 85652 RBC SED RATE AUTOMATED: CPT

## 2020-02-19 PROCEDURE — 1111F DSCHRG MED/CURRENT MED MERGE: CPT | Performed by: INTERNAL MEDICINE

## 2020-02-19 PROCEDURE — 1036F TOBACCO NON-USER: CPT | Performed by: INTERNAL MEDICINE

## 2020-02-19 PROCEDURE — 3080F DIAST BP >= 90 MM HG: CPT | Performed by: INTERNAL MEDICINE

## 2020-02-19 PROCEDURE — 86480 TB TEST CELL IMMUN MEASURE: CPT

## 2020-02-19 PROCEDURE — 36415 COLL VENOUS BLD VENIPUNCTURE: CPT

## 2020-02-19 PROCEDURE — 3077F SYST BP >= 140 MM HG: CPT | Performed by: INTERNAL MEDICINE

## 2020-02-19 PROCEDURE — 86140 C-REACTIVE PROTEIN: CPT

## 2020-02-19 PROCEDURE — 99214 OFFICE O/P EST MOD 30 MIN: CPT | Performed by: INTERNAL MEDICINE

## 2020-02-19 PROCEDURE — 2026F EYE IMG VALID EVC RTNOPTHY: CPT | Performed by: INTERNAL MEDICINE

## 2020-02-19 PROCEDURE — 3066F NEPHROPATHY DOC TX: CPT | Performed by: INTERNAL MEDICINE

## 2020-02-19 RX ORDER — PREDNISONE 1 MG/1
TABLET ORAL
Qty: 120 TABLET | Refills: 2 | Status: SHIPPED | OUTPATIENT
Start: 2020-02-19 | End: 2020-02-24 | Stop reason: SDUPTHER

## 2020-02-20 LAB
GAMMA INTERFERON BACKGROUND BLD IA-ACNC: 0.02 IU/ML
M TB IFN-G BLD-IMP: NEGATIVE
M TB IFN-G CD4+ BCKGRND COR BLD-ACNC: 0 IU/ML
M TB IFN-G CD4+ BCKGRND COR BLD-ACNC: 0.01 IU/ML
MITOGEN IGNF BCKGRD COR BLD-ACNC: 5.09 IU/ML

## 2020-02-21 ENCOUNTER — OFFICE VISIT (OUTPATIENT)
Dept: ENDOCRINOLOGY | Facility: CLINIC | Age: 60
End: 2020-02-21
Payer: COMMERCIAL

## 2020-02-21 VITALS
WEIGHT: 293 LBS | DIASTOLIC BLOOD PRESSURE: 80 MMHG | HEART RATE: 93 BPM | SYSTOLIC BLOOD PRESSURE: 138 MMHG | BODY MASS INDEX: 44.41 KG/M2 | HEIGHT: 68 IN

## 2020-02-21 DIAGNOSIS — Z79.4 TYPE 2 DIABETES MELLITUS WITH MICROALBUMINURIA, WITH LONG-TERM CURRENT USE OF INSULIN (HCC): Primary | ICD-10-CM

## 2020-02-21 DIAGNOSIS — Z79.4 TYPE 2 DIABETES MELLITUS WITH HYPERGLYCEMIA, WITH LONG-TERM CURRENT USE OF INSULIN (HCC): ICD-10-CM

## 2020-02-21 DIAGNOSIS — E66.01 CLASS 3 SEVERE OBESITY DUE TO EXCESS CALORIES WITH SERIOUS COMORBIDITY AND BODY MASS INDEX (BMI) OF 45.0 TO 49.9 IN ADULT (HCC): ICD-10-CM

## 2020-02-21 DIAGNOSIS — E55.9 VITAMIN D DEFICIENCY: ICD-10-CM

## 2020-02-21 DIAGNOSIS — E78.5 HYPERLIPIDEMIA, UNSPECIFIED HYPERLIPIDEMIA TYPE: ICD-10-CM

## 2020-02-21 DIAGNOSIS — E11.65 TYPE 2 DIABETES MELLITUS WITH HYPERGLYCEMIA, UNSPECIFIED WHETHER LONG TERM INSULIN USE (HCC): ICD-10-CM

## 2020-02-21 DIAGNOSIS — E11.65 TYPE 2 DIABETES MELLITUS WITH HYPERGLYCEMIA, WITH LONG-TERM CURRENT USE OF INSULIN (HCC): ICD-10-CM

## 2020-02-21 DIAGNOSIS — E11.29 TYPE 2 DIABETES MELLITUS WITH MICROALBUMINURIA, WITH LONG-TERM CURRENT USE OF INSULIN (HCC): Primary | ICD-10-CM

## 2020-02-21 DIAGNOSIS — R80.9 TYPE 2 DIABETES MELLITUS WITH MICROALBUMINURIA, WITH LONG-TERM CURRENT USE OF INSULIN (HCC): Primary | ICD-10-CM

## 2020-02-21 DIAGNOSIS — IMO0002 UNCONTROLLED TYPE 2 DIABETES MELLITUS WITH OPHTHALMIC COMPLICATION, WITH LONG-TERM CURRENT USE OF INSULIN: ICD-10-CM

## 2020-02-21 PROCEDURE — 3066F NEPHROPATHY DOC TX: CPT | Performed by: INTERNAL MEDICINE

## 2020-02-21 PROCEDURE — 2026F EYE IMG VALID EVC RTNOPTHY: CPT | Performed by: INTERNAL MEDICINE

## 2020-02-21 PROCEDURE — 1111F DSCHRG MED/CURRENT MED MERGE: CPT | Performed by: INTERNAL MEDICINE

## 2020-02-21 PROCEDURE — 1036F TOBACCO NON-USER: CPT | Performed by: INTERNAL MEDICINE

## 2020-02-21 PROCEDURE — 3008F BODY MASS INDEX DOCD: CPT | Performed by: INTERNAL MEDICINE

## 2020-02-21 PROCEDURE — 3066F NEPHROPATHY DOC TX: CPT | Performed by: PHYSICIAN ASSISTANT

## 2020-02-21 PROCEDURE — 3075F SYST BP GE 130 - 139MM HG: CPT | Performed by: INTERNAL MEDICINE

## 2020-02-21 PROCEDURE — 3079F DIAST BP 80-89 MM HG: CPT | Performed by: INTERNAL MEDICINE

## 2020-02-21 PROCEDURE — 99214 OFFICE O/P EST MOD 30 MIN: CPT | Performed by: INTERNAL MEDICINE

## 2020-02-21 RX ORDER — INSULIN LISPRO 100 [IU]/ML
INJECTION, SOLUTION INTRAVENOUS; SUBCUTANEOUS
Qty: 10 PEN | Refills: 3 | Status: SHIPPED | OUTPATIENT
Start: 2020-02-21 | End: 2020-02-24 | Stop reason: SDUPTHER

## 2020-02-21 RX ORDER — INSULIN GLARGINE 100 [IU]/ML
INJECTION, SOLUTION SUBCUTANEOUS
Qty: 5 PEN | Refills: 3
Start: 2020-02-21 | End: 2020-02-28 | Stop reason: DRUGHIGH

## 2020-02-21 RX ORDER — BLOOD SUGAR DIAGNOSTIC
STRIP MISCELLANEOUS
Qty: 300 EACH | Refills: 1 | Status: SHIPPED | OUTPATIENT
Start: 2020-02-21 | End: 2020-07-02 | Stop reason: SDUPTHER

## 2020-02-21 NOTE — PATIENT INSTRUCTIONS
Hypoglycemia in a Person with Diabetes   WHAT YOU NEED TO KNOW:   Hypoglycemia is a serious condition that happens when your blood glucose (sugar) level drops too low  The blood sugar level is usually too high in a person with diabetes, but the level can also drop too low  It is important to follow your diabetes management plan to keep your blood sugar level steady  DISCHARGE INSTRUCTIONS:   Call 911 for any of the following:   · You feel you are going to pass out  · You have a seizure or pass out  · You have trouble thinking clearly  Seek care immediately if:  · Your blood sugar is less than 50 mg/dL and does not respond to treatment  Contact your healthcare provider if:   · You have had symptoms of low blood sugar several times  · You have questions about the amount of insulin or diabetes medicine you are taking  · You have questions or concerns about your condition or care  Medicines:   · Insulin or diabetes medicine  help to keep your blood sugar under control  · Glucagon  may be needed if you have severe hypoglycemia  · Take your medicine as directed  Contact your healthcare provider if you think your medicine is not helping or if you have side effects  Tell him or her if you are allergic to any medicine  Keep a list of the medicines, vitamins, and herbs you take  Include the amounts, and when and why you take them  Bring the list or the pill bottles to follow-up visits  Carry your medicine list with you in case of an emergency  Follow up with your healthcare provider or specialist as directed: You may need dose changes to your insulin or oral diabetes medicine if you have hypoglycemia  Write down your questions so you remember to ask them during your visits  Manage hypoglycemia:   · Check your blood sugar level right away if you have symptoms of hypoglycemia  Hypoglycemia is usually 70 mg/dL or below   Ask your healthcare provider what blood sugar level is too low for you     · If your blood sugar level is too low, eat or drink 15 grams of fast-acting carbohydrate  Examples of this amount of fast-acting carbohydrate are 4 ounces (½ cup) of fruit juice or 4 ounces of regular soda  Other examples are 2 tablespoons of raisins or 3 to 4 glucose tablets  Check your blood sugar level 15 minutes later  If the level is still low (less than 100 mg/dL), have another 15 grams of carbohydrate  When the level returns to 100 mg/dL, eat a snack or meal that contains carbohydrates  This will help prevent another drop in blood sugar  Always carefully follow your healthcare provider's instructions on how to treat low blood sugar levels  · Always carry a source of fast-acting carbohydrate  If you have symptoms of hypoglycemia and you do not have a blood glucose meter, have a source of fast-acting carbohydrate anyway  Avoid carbohydrate foods that are high in fat  The fat content may make it take longer to increase your blood sugar level  Ask your healthcare provider if you should carry a glucagon kit  Glucagon is a medicine that is injected when you develop severe hypoglycemia and become unconscious  Check the expiration date every month and replace it before it expires  · Teach others how to help you if you have symptoms of hypoglycemia  Tell them about the symptoms of hypoglycemia  Ask them to give you a source of fast-acting carbohydrate if you cannot get it yourself  Ask them to give you a glucagon injection if you have symptoms of hypoglycemia and you become unconscious or have a seizure  Ask them to call 911   This is an emergency  Tell them never to try to make you swallow anything if you faint or have a seizure  · Wear medical alert jewelry  or carry a card that says you have diabetes  Ask where to get these items  Prevent hypoglycemia:   · Take diabetes medicine as directed  Take your medicine at the right time and in the right amount   Your healthcare provider may change your blood sugar goals if you get hypoglycemia often  · Eat regular meals and snacks  Talk to your dietitian or healthcare provider about a meal plan that is right for you  Do not skip meals  · Check your blood sugar level as directed  Ask your healthcare provider what your blood sugar levels should be before and after you eat  Ask when and how often to check your blood sugar level  You may need to check at least 3 times each day  Record your blood sugar level results and take the record with you when you see your healthcare provider  Your provider may use the record to make changes to your medicine, food, or exercise schedules  · Check your blood sugar level before you exercise  Exercise can decrease your blood sugar level  If your blood sugar level is less than 100 mg/dL, have a carbohydrate snack  Examples are 4 to 6 crackers, ½ banana, 8 ounces (1 cup) of nonfat or 1% milk, or 4 ounces (½ cup) of juice  If you will exercise for more than 1 hour, you may need to check your blood sugar level every 30 minutes  Your healthcare provider may also recommend that you check your blood sugar level after exercise  · Be aware of how alcohol affects your blood sugar level  Alcohol can cause your blood sugar level to drop for up to 12 hours after drinking  Ask your healthcare provider if alcohol is safe for you  If you drink alcohol, always have a snack or meal at the same time  Women should limit alcohol to 1 drink a day  Men should limit alcohol to 2 drinks a day  A drink of alcohol is 12 ounces of beer, 5 ounces of wine, or 1½ ounces of liquor  © 2017 [address] [employer]  Information is for End User's use only and may not be sold, redistributed or otherwise used for commercial purposes  All illustrations and images included in CareNotes® are the copyrighted property of [employer] , [employer]  or Maco Hernández  The above information is an  only   It is not intended as medical advice for individual conditions or treatments  Talk to your doctor, nurse or pharmacist before following any medical regimen to see if it is safe and effective for you

## 2020-02-21 NOTE — ASSESSMENT & PLAN NOTE
Lab Results   Component Value Date    HGBA1C 9 3 (H) 12/06/2019   Sugars have been high-for now increase basaglar  to 50 units twice daily  Increase Humalog to 30 units before meals  Use a correction scale of 1 unit for every 25 mg/dL above a target of 150  Monitor blood sugar 4 times a day and send over log in 2 weeks

## 2020-02-21 NOTE — ASSESSMENT & PLAN NOTE
Lab Results   Component Value Date    HGBA1C 9 3 (H) 12/06/2019   Continue follow-up with ophthalmologist

## 2020-02-21 NOTE — PROGRESS NOTES
Shadia Moreno 62 y o  female MRN: [id number]    Encounter: [id number]      Assessment/Plan     Problem List Items Addressed This Visit        Endocrine    Type 2 diabetes mellitus with hyperglycemia, with long-term current use of insulin ([facility] )       Lab Results   Component Value Date    HGBA1C 9 3 (H) 12/06/2019   Sugars have been high-for now increase basaglar  to 50 units twice daily  Increase Humalog to 30 units before meals  Use a correction scale of 1 unit for every 25 mg/dL above a target of 150  Monitor blood sugar 4 times a day and send over log in 2 weeks  Relevant Medications    BASAGLAR KWIKPEN 100 units/mL injection pen    HUMALOG KWIKPEN 100 units/mL injection pen    CONTOUR NEXT TEST test strip    Type 2 diabetes mellitus with microalbuminuria, with long-term current use of insulin ([facility]) - Primary       Lab Results   Component Value Date    HGBA1C 9 3 (H) 12/06/2019   Counseled on the importance of strict glycemic control to delay progression to chronic kidney disease           Relevant Medications    BASAGLAR KWIKPEN 100 units/mL injection pen    HUMALOG KWIKPEN 100 units/mL injection pen    Other Relevant Orders    Ambulatory referral to Diabetic Education    Comprehensive metabolic panel Lab Collect    HEMOGLOBIN A1C W/ EAG ESTIMATION Lab Collect    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, with long-term current use of insulin ([facility])       Lab Results   Component Value Date    HGBA1C 9 3 (H) 12/06/2019   Continue follow-up with ophthalmologist         Relevant Medications    BASAGLAR KWIKPEN 100 units/mL injection pen    HUMALOG KWIKPEN 100 units/mL injection pen    Other Relevant Orders    Ambulatory referral to Diabetic Education    Comprehensive metabolic panel Lab Collect    HEMOGLOBIN A1C W/ EAG ESTIMATION Lab Collect       Other    Class 3 severe obesity with serious comorbidity and body mass index (BMI) of 45 0 to 49 9 in [facility])    Relevant Orders Comprehensive metabolic panel Lab Collect    Hyperlipidemia     Continue statins         Vitamin D deficiency     Continue supplementations         Relevant Orders    Vitamin D 25 hydroxy Lab Collect        CC: Diabetes    History of Present Illness     HPI:  58-year-old female with type 2 diabetes, hypertension, hyperlipidemia here for follow-up  She is currently on   basaglar 40-0-0-40  humalog  24-24-24-0  occasionally misses insulin     Has been on prednisone since NOV  For PMR - currently on 10 mg and dose being decreased to 9 mg    Sugars have been high since she has been home mostly 300-400s    C/o polyuria , polydipsia , blurry vision ,  + numbness and tingling in fingers      was hospitalized last week for hypertensive urgency   Review of Systems   Constitutional: Positive for fatigue  Negative for unexpected weight change  Eyes: Positive for visual disturbance  Respiratory: Positive for cough and shortness of breath  Cardiovascular: Negative for palpitations and leg swelling  Gastrointestinal: Positive for constipation and diarrhea  Negative for nausea and vomiting  Endocrine: Positive for polydipsia and polyuria  Musculoskeletal: Positive for arthralgias, gait problem and myalgias  Skin: Negative for pallor, rash and wound  Neurological: Positive for weakness  Psychiatric/Behavioral: Positive for sleep disturbance  The patient is not nervous/anxious  All other systems reviewed and are negative        Historical Information   Past Medical History:   Diagnosis Date    Anemia     Arthritis     Dyspnea on exertion     Hyperlipidemia     Hypertension     Legally blind 2010    Mild intermittent asthma with exacerbation 8/4/2018    Miscarriage     x 3    Seizures (HCC)     Sickle cell trait ([facility] )     Sleep apnea      Past Surgical History:   Procedure Laterality Date    CATARACT EXTRACTION Bilateral     august and september    EYE SURGERY      HYSTERECTOMY      37    OOPHORECTOMY Left     39    RI TEMPORAL ARTERY LIGATN OR BX Bilateral 10/17/2019    Procedure: BIOPSY ARTERY TEMPORAL;  Surgeon: Antoine Bae DO;  Location: [facility];  Service: Vascular     Social History   Social History     Substance and Sexual Activity   Alcohol Use Never    Alcohol/week: 0 0 standard drinks    Frequency: Never    Drinks per session: Patient refused    Binge frequency: Never     Social History     Substance and Sexual Activity   Drug Use No     Social History     Tobacco Use   Smoking Status Never Smoker   Smokeless Tobacco Never Used     Family History:   Family History   Problem Relation Age of Onset    Heart attack Mother     Heart disease Mother     Hypertension Mother     No Known Problems Father     Heart disease Sister     No Known Problems Brother     No Known Problems Son     No Known Problems Daughter     Heart attack Maternal Grandmother     Heart disease Maternal Grandmother     Diabetes Other     Heart attack Other     No Known Problems Sister     No Known Problems Sister     No Known Problems Paternal Aunt     No Known Problems Son     Cancer Neg Hx     Stroke Neg Hx        Meds/Allergies   Current Outpatient Medications   Medication Sig Dispense Refill    albuterol (PROVENTIL HFA) 90 mcg/act inhaler Inhale 2 puffs every 6 (six) hours as needed for wheezing For another 24 hours use every 4 hours standing 6 7 g 0    aspirin (ADULT ASPIRIN EC LOW STRENGTH) 81 mg EC tablet Take 81 mg by mouth daily       atorvastatin (LIPITOR) 40 mg tablet TAKE 1 TABLET BY MOUTH EVERY DAY 30 tablet 0    BASAGLAR KWIKPEN 100 units/mL injection pen 50 units twice daily 5 pen 3    SARAH MICROLET LANCETS lancets Inject 1 applicator into the skin 4 (four) times a day      Blood Glucose Monitoring Suppl (CONTOUR NEXT MONITOR) w/Device KIT Disp 1 meter dx E11 9, may submitted any contour next meter   If not covered let us know we can change strips 1 kit 1    Blood Pressure Monitor KIT Check blood pressures BID 1 each 0    budesonide-formoterol (SYMBICORT) 80-4 5 MCG/ACT inhaler Inhale 2 puffs 2 (two) times a day Rinse mouth after use  (Patient taking differently: Inhale 2 puffs as needed Rinse mouth after use ) 1 Inhaler 5    Cholecalciferol (VITAMIN D3) 3000 units TABS Take 3,000 Units by mouth daily       CONTOUR NEXT TEST test strip Test blood sugar 3x per day dx E11 9 300 each 1    ferrous sulfate 324 (65 Fe) mg Take 1 tablet (324 mg total) by mouth every other day 45 tablet 3    gabapentin (NEURONTIN) 100 mg capsule Take 1 capsule in am and 2-3 capsules at night 90 capsule 0    glucose blood test strip Contour Next Test Strips      HUMALOG KWIKPEN 100 units/mL injection pen Take 30 units before meals- also take 1 unit for 25 mg/dl above 150 10 pen 3    hydrochlorothiazide (HYDRODIURIL) 25 mg tablet Take 25 mg by mouth daily       Insulin Pen Needle (BD PEN NEEDLE DERRICK U/F) 32G X 4 MM MISC Inject 1 applicator under the skin 4 (four) times a day      losartan (COZAAR) 100 MG tablet TAKE 1 TABLET BY MOUTH EVERY DAY 30 tablet 5    predniSONE 1 mg tablet Combine to take 9 mg daily  120 tablet 2    predniSONE 5 mg tablet Take 3 tablets (15 mg total) by mouth daily (Patient taking differently: Take 10 mg by mouth daily ) 90 tablet 2     No current facility-administered medications for this visit  No Known Allergies    Objective   Vitals: Blood pressure 138/80, pulse 93, height 5' 8" (1 727 m), weight (!) 136 kg (300 lb 3 2 oz), not currently breastfeeding  Physical Exam   Constitutional: She is oriented to person, place, and time  She appears well-developed and well-nourished  No distress  HENT:   Head: Normocephalic and atraumatic  Eyes: EOM are normal  No scleral icterus  Neck: Normal range of motion  Neck supple  No thyromegaly present  Cardiovascular: Normal rate, regular rhythm and normal heart sounds  No murmur heard    Pulmonary/Chest: Effort normal and breath sounds normal  No respiratory distress  She has no wheezes  She has no rales  Abdominal: Soft  Bowel sounds are normal  She exhibits no distension  There is no tenderness  There is no guarding  Musculoskeletal: Normal range of motion  She exhibits no edema or deformity  Lymphadenopathy:     She has no cervical adenopathy  Neurological: She is alert and oriented to person, place, and time  Skin: Skin is warm and dry  Psychiatric: She has a normal mood and affect  Her behavior is normal  Judgment and thought content normal    Vitals reviewed  The history was obtained from the review of the chart, patient      Lab Results:   Lab Results   Component Value Date/Time    Hemoglobin A1C 9 3 (H) 12/06/2019 02:46 PM    Hemoglobin A1C 14 6 (H) 08/22/2019 12:23 PM    Hemoglobin A1C 9 9 (A) 04/03/2019 09:59 AM    WBC 9 71 02/11/2020 09:21 AM    WBC 10 93 (H) 02/10/2020 11:42 AM    WBC 13 24 (H) 01/07/2020 11:18 AM    Hemoglobin 10 7 (L) 02/11/2020 09:21 AM    Hemoglobin 11 2 (L) 02/10/2020 11:42 AM    Hemoglobin 10 2 (L) 01/07/2020 11:18 AM    Hematocrit 35 8 02/11/2020 09:21 AM    Hematocrit 37 6 02/10/2020 11:42 AM    Hematocrit 34 7 (L) 01/07/2020 11:18 AM    MCV 76 (L) 02/11/2020 09:21 AM    MCV 76 (L) 02/10/2020 11:42 AM    MCV 79 (L) 01/07/2020 11:18 AM    Platelets 038 34/16/7628 09:21 AM    Platelets 627 64/79/3349 11:42 AM    Platelets 911 43/05/8045 11:18 AM    BUN 22 02/12/2020 06:00 AM    BUN 17 02/11/2020 07:02 PM    BUN 14 02/10/2020 11:42 AM    Potassium 3 3 (L) 02/12/2020 06:00 AM    Potassium 4 7 02/11/2020 07:02 PM    Potassium 3 5 02/10/2020 11:42 AM    Chloride 106 02/12/2020 06:00 AM    Chloride 95 (L) 02/11/2020 07:02 PM    Chloride 102 02/10/2020 11:42 AM    CO2 29 02/12/2020 06:00 AM    CO2 29 02/11/2020 07:02 PM    CO2 30 02/10/2020 11:42 AM    Creatinine 0 83 02/12/2020 06:00 AM    Creatinine 1 39 (H) 02/11/2020 07:02 PM    Creatinine 0 91 02/10/2020 11:42 AM    AST 8 02/12/2020 06:00 AM    AST 23 02/11/2020 07:02 PM    AST 12 02/10/2020 11:42 AM    ALT 15 02/12/2020 06:00 AM    ALT 23 02/11/2020 07:02 PM    ALT 19 02/10/2020 11:42 AM    Albumin 2 6 (L) 02/12/2020 06:00 AM    Albumin 3 3 (L) 02/11/2020 07:02 PM    Albumin 3 0 (L) 02/10/2020 11:42 AM    HDL, Direct 37 (L) 08/24/2019 10:20 AM    Triglycerides 124 08/24/2019 10:20 AM           Portions of the record may have been created with voice recognition software  Occasional wrong word or "sound a like" substitutions may have occurred due to the inherent limitations of voice recognition software  Read the chart carefully and recognize, using context, where substitutions have occurred

## 2020-02-21 NOTE — ASSESSMENT & PLAN NOTE
Lab Results   Component Value Date    HGBA1C 9 3 (H) 12/06/2019   Counseled on the importance of strict glycemic control to delay progression to chronic kidney disease

## 2020-02-24 ENCOUNTER — OFFICE VISIT (OUTPATIENT)
Dept: FAMILY MEDICINE CLINIC | Facility: CLINIC | Age: 60
End: 2020-02-24

## 2020-02-24 VITALS
HEIGHT: 68 IN | DIASTOLIC BLOOD PRESSURE: 68 MMHG | OXYGEN SATURATION: 98 % | WEIGHT: 293 LBS | SYSTOLIC BLOOD PRESSURE: 110 MMHG | TEMPERATURE: 97.6 F | HEART RATE: 92 BPM | BODY MASS INDEX: 44.41 KG/M2 | RESPIRATION RATE: 16 BRPM

## 2020-02-24 DIAGNOSIS — I28.8 ENLARGED PULMONARY ARTERY (HCC): Primary | ICD-10-CM

## 2020-02-24 DIAGNOSIS — G47.33 MILD OBSTRUCTIVE SLEEP APNEA: ICD-10-CM

## 2020-02-24 DIAGNOSIS — G63 POLYNEUROPATHY ASSOCIATED WITH UNDERLYING DISEASE (HCC): ICD-10-CM

## 2020-02-24 DIAGNOSIS — E78.49 OTHER HYPERLIPIDEMIA: ICD-10-CM

## 2020-02-24 DIAGNOSIS — M35.3 POLYMYALGIA RHEUMATICA (HCC): ICD-10-CM

## 2020-02-24 DIAGNOSIS — E66.01 CLASS 3 SEVERE OBESITY DUE TO EXCESS CALORIES WITH SERIOUS COMORBIDITY AND BODY MASS INDEX (BMI) OF 45.0 TO 49.9 IN ADULT (HCC): ICD-10-CM

## 2020-02-24 DIAGNOSIS — IMO0002 UNCONTROLLED TYPE 2 DIABETES MELLITUS WITH OPHTHALMIC COMPLICATION, WITH LONG-TERM CURRENT USE OF INSULIN: ICD-10-CM

## 2020-02-24 PROCEDURE — 99495 TRANSJ CARE MGMT MOD F2F 14D: CPT | Performed by: PHYSICIAN ASSISTANT

## 2020-02-24 PROCEDURE — 1111F DSCHRG MED/CURRENT MED MERGE: CPT | Performed by: PHYSICIAN ASSISTANT

## 2020-02-24 RX ORDER — INSULIN LISPRO 100 [IU]/ML
INJECTION, SOLUTION INTRAVENOUS; SUBCUTANEOUS
Qty: 10 PEN | Refills: 3 | Status: SHIPPED | OUTPATIENT
Start: 2020-02-24 | End: 2020-02-28 | Stop reason: DRUGHIGH

## 2020-02-24 RX ORDER — PREDNISONE 1 MG/1
TABLET ORAL
Qty: 120 TABLET | Refills: 2 | Status: SHIPPED | OUTPATIENT
Start: 2020-02-24 | End: 2020-06-18 | Stop reason: SDUPTHER

## 2020-02-24 RX ORDER — ATORVASTATIN CALCIUM 40 MG/1
40 TABLET, FILM COATED ORAL DAILY
Qty: 90 TABLET | Refills: 1 | Status: SHIPPED | OUTPATIENT
Start: 2020-02-24 | End: 2020-09-08 | Stop reason: SDUPTHER

## 2020-02-25 ENCOUNTER — TELEPHONE (OUTPATIENT)
Dept: OBGYN CLINIC | Facility: HOSPITAL | Age: 60
End: 2020-02-25

## 2020-02-25 NOTE — ASSESSMENT & PLAN NOTE
Recommend consultation with sleep medicine to see if there is any other treatments available for her sleep apnea  This may be contributing to her tiredness

## 2020-02-25 NOTE — TELEPHONE ENCOUNTER
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

## 2020-02-26 ENCOUNTER — TELEPHONE (OUTPATIENT)
Dept: FAMILY MEDICINE CLINIC | Facility: CLINIC | Age: 60
End: 2020-02-26

## 2020-02-26 ENCOUNTER — TELEPHONE (OUTPATIENT)
Dept: OBGYN CLINIC | Facility: HOSPITAL | Age: 60
End: 2020-02-26

## 2020-02-26 ENCOUNTER — OFFICE VISIT (OUTPATIENT)
Dept: DIABETES SERVICES | Facility: CLINIC | Age: 60
End: 2020-02-26
Payer: COMMERCIAL

## 2020-02-26 VITALS — WEIGHT: 293 LBS | BODY MASS INDEX: 45.28 KG/M2

## 2020-02-26 DIAGNOSIS — M35.3 PMR (POLYMYALGIA RHEUMATICA) (HCC): ICD-10-CM

## 2020-02-26 DIAGNOSIS — R80.9 TYPE 2 DIABETES MELLITUS WITH MICROALBUMINURIA, WITHOUT LONG-TERM CURRENT USE OF INSULIN (HCC): Primary | ICD-10-CM

## 2020-02-26 DIAGNOSIS — E11.29 TYPE 2 DIABETES MELLITUS WITH MICROALBUMINURIA, WITHOUT LONG-TERM CURRENT USE OF INSULIN (HCC): Primary | ICD-10-CM

## 2020-02-26 PROCEDURE — 97802 MEDICAL NUTRITION INDIV IN: CPT | Performed by: DIETITIAN, REGISTERED

## 2020-02-26 RX ORDER — PREDNISONE 1 MG/1
TABLET ORAL
Qty: 30 TABLET | Refills: 2 | Status: SHIPPED | OUTPATIENT
Start: 2020-02-26 | End: 2020-06-18 | Stop reason: SDUPTHER

## 2020-02-26 NOTE — PROGRESS NOTES
Medical Nutrition Therapy        Assessment    Visit Type: Initial visit  Chief complaint/Medical Diagnosis/reason for visit E11 29, R80 9, Z79 4 (T2DM with microalbuminuria, wiith long term insulin use)     HPI Brayan Deshpande was seen today for her initial MNT visit  She has been seen many times in the past for both nutrition and carbohydrate counting  Patient reports having a lot of stress in her life secondary to issues with her sight causing her to stop working and taking her ex- in to help him out  Brayan Deshpande states,"I do a lot of emotional eating to cope with all of the stress"  Main problems identified in food recall include inconsistent carbohydrate intake, large portions and excess carbohydrate coming from juice  Encouraged the consumption of regular meals at regular times about 4-5 hours apart  Advised patient to keep carbohydrate intake to 30-45 grams per meal and 15 grams per between meal snack to assist with glycemic control  Brayan Deshpande would benefit from avoiding juice and other sweetened beverages  Patient agreed to keep daily food logs  Follow-up was suggested in 4 weeks  RD will remain available for further dietary questions/concerns  Ht Readings from Last 1 Encounters:   02/24/20 5' 8" (1 727 m)     Wt Readings from Last 3 Encounters:   02/26/20 135 kg (297 lb 12 8 oz)   02/24/20 135 kg (298 lb)   02/21/20 (!) 136 kg (300 lb 3 2 oz)     Weight Change: Yes Weight loss of 40 pounds since the fall (some planned and some not planned)      Barriers to Learning: vision, physical and pain (in joints from PMR)    Do you follow any special diet presently?: Yes - low carb and low sodium diet  Who shops: patient and spouse  Who cooks: patient and spouse    Food Log: Completed via the method of food recall    Breakfast:9:00AM 2 eggs with cheese, coffee with creamer; weekends may make fried potatoes with eggs and sausage  Morning Snack: 11:00AM 4 times a week: small piece of fruit (apple or banana or orange)  Lunch:1:30-2:00PM leftovers protein and veggies or eggs with cheese or salad with chicken and 2 tbsp [place] dressing, water or 3 times a week: 16 oz orange juice mixed with cranberry juice  Afternoon Snack: 3:00PM 2 cups fruit salad or a small piece of fruit  Dinner:6:00-6:30PM  protein and veggies, sometimes (4 days a week) with 2 cups of rice or potatoes or eggs with cheese or salad with chicken and 2 tbsp [place] dressing, water or 16 oz juice if did not have it at lunch  Evening Snack: 9:00PM most of the time no snack; occasionally, 1 single portion microwave bag of popcorn  Beverages: water, juice or coffee  Eating out/Take out:2 times a month take-out pizza or [place] food  Exercise walk 3 days a week for 60 minutes    Calorie needs 1600 kcals/day Carbs: 30-45g/meal, 15g/snack        Nutrition Diagnosis:  Not ready for diet/lifestyle change  related to Unwilling or disinterested in learning/applying information as evidenced by Previous failures to effectively change target behavior    Intervention: behavior modification strategies and carbohydrate counting     Treatment Goals: Patient will count carbohydrates    Monitoring and evaluation:    Term code indicator  FH 1 6 3 Carbohydrate Intake Criteria: Keep carb intake to 30-45 grams per meal and no more than 15 grams per between meal snack    Materials Provided: Portions Booklet, food logs    Patients Response to Instruction:  Comprehensiongood  Motivationgood  Expected Compliancegood    Start- Stop: 10:55-11:55  Total Minutes: 60 Minutes  Group or Individual Instruction: MNT-I  Other: Dr Dusty Contreras    Thank you for coming to the [facility] for education today  Please feel free to call with any questions or concerns      Claudette Bynum  [address] [place] [phone]

## 2020-02-26 NOTE — TELEPHONE ENCOUNTER
No, from a rheum perspective I do not need to keep filling out her disability   Maybe clarify with the patient first?

## 2020-02-26 NOTE — TELEPHONE ENCOUNTER
Patient sees Dr Melanie Clarke at [facility] is calling in wanting to get clarification if this patient is going to be needing permanent disability or not? She is asking if someone can contact her back relating this          Call back# [phone]

## 2020-02-26 NOTE — TELEPHONE ENCOUNTER
Patient sees Dr Enoc Barksdale at [address] [place] [address] is calling in to get a medication refill for this patients Prednisone 5mg and also prednisone 2 5mg  She currently has no tabs remaining  They are asking if this can be sent to [facility] in [facility] [address]  Once this is sent over if we can contact the patient to notify her it was sent        Call back# [phone]

## 2020-02-27 NOTE — TELEPHONE ENCOUNTER
Spoke with Vasyl Pulido at [facility] and finally confirmed we will not be filling these forms out on a long term basis

## 2020-02-28 ENCOUNTER — TELEPHONE (OUTPATIENT)
Dept: ENDOCRINOLOGY | Facility: CLINIC | Age: 60
End: 2020-02-28

## 2020-02-28 ENCOUNTER — PATIENT OUTREACH (OUTPATIENT)
Dept: FAMILY MEDICINE CLINIC | Facility: CLINIC | Age: 60
End: 2020-02-28

## 2020-02-28 RX ORDER — INSULIN LISPRO 100 [IU]/ML
35 INJECTION, SOLUTION INTRAVENOUS; SUBCUTANEOUS
COMMUNITY
End: 2020-05-08 | Stop reason: SDUPTHER

## 2020-02-28 RX ORDER — INSULIN GLARGINE 100 [IU]/ML
60 INJECTION, SOLUTION SUBCUTANEOUS 2 TIMES DAILY
COMMUNITY
End: 2020-04-03

## 2020-02-28 NOTE — PROGRESS NOTES
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

## 2020-02-28 NOTE — TELEPHONE ENCOUNTER
[facility] nurse called to say that the patient blood sugars have been running really high over the past 2 days  She is taking Basaglar - 55 units AM & 50 units PM and Humalog 100 units/mL - 30 units before meals- also take 1 unit for 25 mg/dl above 150     2/26  Fasting - 384  Afternoon - 317  Bedtime - 309    2/27  Before lunch - 432  Before dinner - 386  Bedtime - 514    2/28  Fasting - 414    Average glucose per meter 427    Please review

## 2020-02-28 NOTE — PROGRESS NOTES
Outgoing Call 2/28/20    CHW spoke w/Soledad re: update on transportation [facility] for Maryana Montero was approved for [employer] but needs to purchase tickets for medical and non-medical related appts  CHW will contact Maryana Montero and rely information

## 2020-03-02 ENCOUNTER — PATIENT OUTREACH (OUTPATIENT)
Dept: FAMILY MEDICINE CLINIC | Facility: CLINIC | Age: 60
End: 2020-03-02

## 2020-03-02 NOTE — PROGRESS NOTES
Outgoing Call 3/2/20    CHW spoke w/Edie Wilson indicated she hadn't received her [facility] paperwork  CHW explained the process and assisted Nathaniel Wilson with navigating through [facility]  CHW asked Nathaniel Wilson if there were other resources Nathaniel Wilson needed assistance with at this time  Nathaniel Wilson indicated she didn't think so  CHW informed Nathaniel Wilson I would close her case and if she needed assistance in the future to contact either myself or PCP for another referral   Nathaniel Wilson verbalized she understood       Case closed 3/2/20

## 2020-03-03 ENCOUNTER — TELEPHONE (OUTPATIENT)
Dept: ENDOCRINOLOGY | Facility: CLINIC | Age: 60
End: 2020-03-03

## 2020-03-03 NOTE — TELEPHONE ENCOUNTER
How did the dose get changed like that - if she is checking f s after eating is she also taking insulin after eating -   This does not add up - please tell her to take insulin like we told her last time - she needs to come in so it can discussed further

## 2020-03-03 NOTE — TELEPHONE ENCOUNTER
Called and notified patient  Also called [facility] nurse Jay Sharp and notified her  Someone will go to see her this week and make sure she is dialing and injecting the insulin correctly

## 2020-03-03 NOTE — TELEPHONE ENCOUNTER
MARY LOU BIRDA nurse called to report patient's blood sugars --    2/28    3:22PM - 388  7:53PM - 555    2/29    3:19AM - 484  11:14AM - 336  3:05PM - 274  11:46PM - 383    3/1    8:22AM - 215  7:05PM - 476    3/2  12:41AM - 319  9:04AM - 166  2:21PM - 309    3/3  10:56AM - 316    Nurse thinks the time and date are wrong on her meter  She also mentioned that her Prednisone dose is now 9 mg daily  Please review  Thank you!

## 2020-03-03 NOTE — TELEPHONE ENCOUNTER
Called patient to discuss  She said the readings are some before meals and some 2-3 hours after meals  I wanted to clarify her insulin dosing to make sure she adjusted it correctly  She said she was taking Humalog 50 units tid and Basaglar 60 units bid  I explained to her that's not what the Humalog was changed to  I did ask her is she was sure it was 50 units and she said yes  She was only changed to 35 on Friday  Please review

## 2020-03-03 NOTE — TELEPHONE ENCOUNTER
Dose was increased 3 days back-if the timing is not accurate - then do we know if these are all pre meal numbers ? Is it before breakfast, lunch and dinner?

## 2020-03-05 ENCOUNTER — HOSPITAL ENCOUNTER (OUTPATIENT)
Facility: HOSPITAL | Age: 60
Setting detail: OBSERVATION
Discharge: HOME HEALTH | End: 2020-03-06
Attending: EMERGENCY MEDICINE | Admitting: INTERNAL MEDICINE
Payer: COMMERCIAL

## 2020-03-05 ENCOUNTER — TELEPHONE (OUTPATIENT)
Dept: FAMILY MEDICINE CLINIC | Facility: CLINIC | Age: 60
End: 2020-03-05

## 2020-03-05 ENCOUNTER — APPOINTMENT (EMERGENCY)
Dept: RADIOLOGY | Facility: HOSPITAL | Age: 60
End: 2020-03-05
Payer: COMMERCIAL

## 2020-03-05 DIAGNOSIS — R77.8 ELEVATED TROPONIN: Primary | ICD-10-CM

## 2020-03-05 DIAGNOSIS — H54.3 VISUAL IMPAIRMENT IN BOTH EYES: ICD-10-CM

## 2020-03-05 DIAGNOSIS — Z79.4 TYPE 2 DIABETES MELLITUS WITH MICROALBUMINURIA, WITH LONG-TERM CURRENT USE OF INSULIN (HCC): ICD-10-CM

## 2020-03-05 DIAGNOSIS — R80.9 TYPE 2 DIABETES MELLITUS WITH MICROALBUMINURIA, WITH LONG-TERM CURRENT USE OF INSULIN (HCC): ICD-10-CM

## 2020-03-05 DIAGNOSIS — R56.9 SEIZURES (HCC): ICD-10-CM

## 2020-03-05 DIAGNOSIS — E11.29 TYPE 2 DIABETES MELLITUS WITH MICROALBUMINURIA, WITH LONG-TERM CURRENT USE OF INSULIN (HCC): ICD-10-CM

## 2020-03-05 DIAGNOSIS — R94.31 PROLONGED QT INTERVAL: ICD-10-CM

## 2020-03-05 DIAGNOSIS — I16.0 HYPERTENSIVE URGENCY: ICD-10-CM

## 2020-03-05 DIAGNOSIS — G43.909 MIGRAINE HEADACHE: ICD-10-CM

## 2020-03-05 PROBLEM — I72.9 ANEURYSM (HCC): Status: ACTIVE | Noted: 2020-03-05

## 2020-03-05 LAB
ALBUMIN SERPL BCP-MCNC: 3 G/DL (ref 3.5–5)
ALP SERPL-CCNC: 134 U/L (ref 46–116)
ALT SERPL W P-5'-P-CCNC: 15 U/L (ref 12–78)
ANION GAP SERPL CALCULATED.3IONS-SCNC: 7 MMOL/L (ref 4–13)
AST SERPL W P-5'-P-CCNC: 8 U/L (ref 5–45)
ATRIAL RATE: 72 BPM
ATRIAL RATE: 77 BPM
ATRIAL RATE: 91 BPM
BASE EX.OXY STD BLDV CALC-SCNC: 92.9 % (ref 60–80)
BASE EXCESS BLDV CALC-SCNC: 5.5 MMOL/L
BASOPHILS # BLD AUTO: 0.04 THOUSANDS/ΜL (ref 0–0.1)
BASOPHILS NFR BLD AUTO: 0 % (ref 0–1)
BETA-HYDROXYBUTYRATE: 0.1 MMOL/L
BILIRUB DIRECT SERPL-MCNC: 0.12 MG/DL (ref 0–0.2)
BILIRUB SERPL-MCNC: 0.4 MG/DL (ref 0.2–1)
BUN SERPL-MCNC: 14 MG/DL (ref 5–25)
CALCIUM SERPL-MCNC: 9.5 MG/DL (ref 8.3–10.1)
CHLORIDE SERPL-SCNC: 100 MMOL/L (ref 100–108)
CO2 SERPL-SCNC: 32 MMOL/L (ref 21–32)
CREAT SERPL-MCNC: 0.98 MG/DL (ref 0.6–1.3)
EOSINOPHIL # BLD AUTO: 0.13 THOUSAND/ΜL (ref 0–0.61)
EOSINOPHIL NFR BLD AUTO: 1 % (ref 0–6)
ERYTHROCYTE [DISTWIDTH] IN BLOOD BY AUTOMATED COUNT: 18.6 % (ref 11.6–15.1)
GFR SERPL CREATININE-BSD FRML MDRD: 72 ML/MIN/1.73SQ M
GLUCOSE SERPL-MCNC: 211 MG/DL (ref 65–140)
GLUCOSE SERPL-MCNC: 226 MG/DL (ref 65–140)
GLUCOSE SERPL-MCNC: 234 MG/DL (ref 65–140)
GLUCOSE SERPL-MCNC: 288 MG/DL (ref 65–140)
HCO3 BLDV-SCNC: 30.3 MMOL/L (ref 24–30)
HCT VFR BLD AUTO: 35.5 % (ref 34.8–46.1)
HGB BLD-MCNC: 10.7 G/DL (ref 11.5–15.4)
IMM GRANULOCYTES # BLD AUTO: 0.08 THOUSAND/UL (ref 0–0.2)
IMM GRANULOCYTES NFR BLD AUTO: 1 % (ref 0–2)
LYMPHOCYTES # BLD AUTO: 2.14 THOUSANDS/ΜL (ref 0.6–4.47)
LYMPHOCYTES NFR BLD AUTO: 20 % (ref 14–44)
MCH RBC QN AUTO: 23.6 PG (ref 26.8–34.3)
MCHC RBC AUTO-ENTMCNC: 30.1 G/DL (ref 31.4–37.4)
MCV RBC AUTO: 78 FL (ref 82–98)
MONOCYTES # BLD AUTO: 0.49 THOUSAND/ΜL (ref 0.17–1.22)
MONOCYTES NFR BLD AUTO: 5 % (ref 4–12)
NEUTROPHILS # BLD AUTO: 7.96 THOUSANDS/ΜL (ref 1.85–7.62)
NEUTS SEG NFR BLD AUTO: 73 % (ref 43–75)
NRBC BLD AUTO-RTO: 0 /100 WBCS
NT-PROBNP SERPL-MCNC: 38 PG/ML
O2 CT BLDV-SCNC: 15.3 ML/DL
P AXIS: 46 DEGREES
P AXIS: 54 DEGREES
P AXIS: 77 DEGREES
PCO2 BLDV: 45 MM HG (ref 42–50)
PH BLDV: 7.45 [PH] (ref 7.3–7.4)
PLATELET # BLD AUTO: 320 THOUSANDS/UL (ref 149–390)
PLATELET # BLD AUTO: 321 THOUSANDS/UL (ref 149–390)
PMV BLD AUTO: 10.1 FL (ref 8.9–12.7)
PMV BLD AUTO: 9.7 FL (ref 8.9–12.7)
PO2 BLDV: 69.8 MM HG (ref 35–45)
POTASSIUM SERPL-SCNC: 3.3 MMOL/L (ref 3.5–5.3)
PR INTERVAL: 182 MS
PR INTERVAL: 206 MS
PR INTERVAL: 206 MS
PROT SERPL-MCNC: 7.2 G/DL (ref 6.4–8.2)
QRS AXIS: 19 DEGREES
QRS AXIS: 21 DEGREES
QRS AXIS: 7 DEGREES
QRSD INTERVAL: 100 MS
QRSD INTERVAL: 104 MS
QRSD INTERVAL: 106 MS
QT INTERVAL: 384 MS
QT INTERVAL: 402 MS
QT INTERVAL: 432 MS
QTC INTERVAL: 454 MS
QTC INTERVAL: 472 MS
QTC INTERVAL: 473 MS
RBC # BLD AUTO: 4.54 MILLION/UL (ref 3.81–5.12)
SODIUM SERPL-SCNC: 139 MMOL/L (ref 136–145)
T WAVE AXIS: 33 DEGREES
T WAVE AXIS: 46 DEGREES
T WAVE AXIS: 51 DEGREES
TROPONIN I SERPL-MCNC: 0.02 NG/ML
TROPONIN I SERPL-MCNC: 0.02 NG/ML
TROPONIN I SERPL-MCNC: 0.03 NG/ML
TROPONIN I SERPL-MCNC: 0.05 NG/ML
VENTRICULAR RATE: 72 BPM
VENTRICULAR RATE: 77 BPM
VENTRICULAR RATE: 91 BPM
WBC # BLD AUTO: 10.84 THOUSAND/UL (ref 4.31–10.16)

## 2020-03-05 PROCEDURE — 93010 ELECTROCARDIOGRAM REPORT: CPT | Performed by: INTERNAL MEDICINE

## 2020-03-05 PROCEDURE — 71046 X-RAY EXAM CHEST 2 VIEWS: CPT

## 2020-03-05 PROCEDURE — 80048 BASIC METABOLIC PNL TOTAL CA: CPT | Performed by: EMERGENCY MEDICINE

## 2020-03-05 PROCEDURE — 80076 HEPATIC FUNCTION PANEL: CPT | Performed by: INTERNAL MEDICINE

## 2020-03-05 PROCEDURE — 93005 ELECTROCARDIOGRAM TRACING: CPT

## 2020-03-05 PROCEDURE — 99285 EMERGENCY DEPT VISIT HI MDM: CPT | Performed by: EMERGENCY MEDICINE

## 2020-03-05 PROCEDURE — 36415 COLL VENOUS BLD VENIPUNCTURE: CPT | Performed by: EMERGENCY MEDICINE

## 2020-03-05 PROCEDURE — 84484 ASSAY OF TROPONIN QUANT: CPT | Performed by: INTERNAL MEDICINE

## 2020-03-05 PROCEDURE — 82948 REAGENT STRIP/BLOOD GLUCOSE: CPT

## 2020-03-05 PROCEDURE — 83880 ASSAY OF NATRIURETIC PEPTIDE: CPT | Performed by: INTERNAL MEDICINE

## 2020-03-05 PROCEDURE — 85049 AUTOMATED PLATELET COUNT: CPT | Performed by: INTERNAL MEDICINE

## 2020-03-05 PROCEDURE — 84484 ASSAY OF TROPONIN QUANT: CPT | Performed by: EMERGENCY MEDICINE

## 2020-03-05 PROCEDURE — 99220 PR INITIAL OBSERVATION CARE/DAY 70 MINUTES: CPT | Performed by: INTERNAL MEDICINE

## 2020-03-05 PROCEDURE — 82010 KETONE BODYS QUAN: CPT | Performed by: EMERGENCY MEDICINE

## 2020-03-05 PROCEDURE — 82805 BLOOD GASES W/O2 SATURATION: CPT | Performed by: EMERGENCY MEDICINE

## 2020-03-05 PROCEDURE — 85025 COMPLETE CBC W/AUTO DIFF WBC: CPT | Performed by: EMERGENCY MEDICINE

## 2020-03-05 PROCEDURE — 99285 EMERGENCY DEPT VISIT HI MDM: CPT

## 2020-03-05 RX ORDER — HEPARIN SODIUM 5000 [USP'U]/ML
5000 INJECTION, SOLUTION INTRAVENOUS; SUBCUTANEOUS EVERY 8 HOURS SCHEDULED
Status: DISCONTINUED | OUTPATIENT
Start: 2020-03-05 | End: 2020-03-06 | Stop reason: HOSPADM

## 2020-03-05 RX ORDER — GABAPENTIN 100 MG/1
100 CAPSULE ORAL 2 TIMES DAILY
Status: DISCONTINUED | OUTPATIENT
Start: 2020-03-05 | End: 2020-03-06 | Stop reason: HOSPADM

## 2020-03-05 RX ORDER — MAGNESIUM HYDROXIDE/ALUMINUM HYDROXICE/SIMETHICONE 120; 1200; 1200 MG/30ML; MG/30ML; MG/30ML
30 SUSPENSION ORAL EVERY 6 HOURS PRN
Status: DISCONTINUED | OUTPATIENT
Start: 2020-03-05 | End: 2020-03-06 | Stop reason: HOSPADM

## 2020-03-05 RX ORDER — ALBUTEROL SULFATE 90 UG/1
2 AEROSOL, METERED RESPIRATORY (INHALATION) EVERY 6 HOURS PRN
Status: DISCONTINUED | OUTPATIENT
Start: 2020-03-05 | End: 2020-03-06 | Stop reason: HOSPADM

## 2020-03-05 RX ORDER — ASPIRIN 81 MG/1
81 TABLET ORAL DAILY
Status: DISCONTINUED | OUTPATIENT
Start: 2020-03-06 | End: 2020-03-06 | Stop reason: HOSPADM

## 2020-03-05 RX ORDER — DOCUSATE SODIUM 100 MG/1
100 CAPSULE, LIQUID FILLED ORAL 2 TIMES DAILY
Status: DISCONTINUED | OUTPATIENT
Start: 2020-03-05 | End: 2020-03-06 | Stop reason: HOSPADM

## 2020-03-05 RX ORDER — BUDESONIDE AND FORMOTEROL FUMARATE DIHYDRATE 80; 4.5 UG/1; UG/1
2 AEROSOL RESPIRATORY (INHALATION) 2 TIMES DAILY
Status: DISCONTINUED | OUTPATIENT
Start: 2020-03-05 | End: 2020-03-06 | Stop reason: HOSPADM

## 2020-03-05 RX ORDER — MELATONIN
3000 DAILY
Status: DISCONTINUED | OUTPATIENT
Start: 2020-03-06 | End: 2020-03-06 | Stop reason: HOSPADM

## 2020-03-05 RX ORDER — HYDROCHLOROTHIAZIDE 25 MG/1
25 TABLET ORAL DAILY
Status: DISCONTINUED | OUTPATIENT
Start: 2020-03-06 | End: 2020-03-06 | Stop reason: HOSPADM

## 2020-03-05 RX ORDER — ONDANSETRON 2 MG/ML
4 INJECTION INTRAMUSCULAR; INTRAVENOUS EVERY 6 HOURS PRN
Status: DISCONTINUED | OUTPATIENT
Start: 2020-03-05 | End: 2020-03-06 | Stop reason: HOSPADM

## 2020-03-05 RX ORDER — METHYLPREDNISOLONE SODIUM SUCCINATE 125 MG/2ML
INJECTION, POWDER, LYOPHILIZED, FOR SOLUTION INTRAMUSCULAR; INTRAVENOUS
Status: DISPENSED
Start: 2020-03-05 | End: 2020-03-06

## 2020-03-05 RX ORDER — SENNOSIDES 8.6 MG
1 TABLET ORAL DAILY
Status: DISCONTINUED | OUTPATIENT
Start: 2020-03-06 | End: 2020-03-06 | Stop reason: HOSPADM

## 2020-03-05 RX ORDER — ALBUTEROL SULFATE 2.5 MG/3ML
SOLUTION RESPIRATORY (INHALATION)
Status: DISPENSED
Start: 2020-03-05 | End: 2020-03-06

## 2020-03-05 RX ORDER — ATORVASTATIN CALCIUM 40 MG/1
40 TABLET, FILM COATED ORAL
Status: DISCONTINUED | OUTPATIENT
Start: 2020-03-05 | End: 2020-03-06 | Stop reason: HOSPADM

## 2020-03-05 RX ORDER — LOSARTAN POTASSIUM 50 MG/1
100 TABLET ORAL DAILY
Status: DISCONTINUED | OUTPATIENT
Start: 2020-03-06 | End: 2020-03-06 | Stop reason: HOSPADM

## 2020-03-05 RX ORDER — INSULIN GLARGINE 100 [IU]/ML
60 INJECTION, SOLUTION SUBCUTANEOUS EVERY 12 HOURS SCHEDULED
Status: DISCONTINUED | OUTPATIENT
Start: 2020-03-05 | End: 2020-03-06 | Stop reason: HOSPADM

## 2020-03-05 RX ORDER — ACETAMINOPHEN 325 MG/1
650 TABLET ORAL EVERY 4 HOURS PRN
Status: DISCONTINUED | OUTPATIENT
Start: 2020-03-05 | End: 2020-03-06 | Stop reason: HOSPADM

## 2020-03-05 RX ADMIN — BUDESONIDE AND FORMOTEROL FUMARATE DIHYDRATE 2 PUFF: 80; 4.5 AEROSOL RESPIRATORY (INHALATION) at 21:58

## 2020-03-05 RX ADMIN — INSULIN LISPRO 6 UNITS: 100 INJECTION, SOLUTION INTRAVENOUS; SUBCUTANEOUS at 19:17

## 2020-03-05 RX ADMIN — HEPARIN SODIUM 5000 UNITS: 5000 INJECTION INTRAVENOUS; SUBCUTANEOUS at 22:01

## 2020-03-05 RX ADMIN — INSULIN LISPRO 35 UNITS: 100 INJECTION, SOLUTION INTRAVENOUS; SUBCUTANEOUS at 19:17

## 2020-03-05 RX ADMIN — DOCUSATE SODIUM 100 MG: 100 CAPSULE, LIQUID FILLED ORAL at 21:58

## 2020-03-05 RX ADMIN — GABAPENTIN 100 MG: 100 CAPSULE ORAL at 21:57

## 2020-03-05 RX ADMIN — INSULIN GLARGINE 60 UNITS: 100 INJECTION, SOLUTION SUBCUTANEOUS at 22:01

## 2020-03-05 NOTE — TELEPHONE ENCOUNTER
SIGNATURES NEEDED FOR [place]  FORM RECEIVED VIA FAX  WILL BE PLACED IN YOUR BIN AT ASSIGNED DELIVERY TIMES

## 2020-03-05 NOTE — TELEPHONE ENCOUNTER
Visiting nurse reports 408 glucose and BP in 621'V diastolic  She will be sending to ED   I agreed with plan

## 2020-03-05 NOTE — ED PROVIDER NOTES
History  Chief Complaint   Patient presents with    Hypertension     pt c/o hypertension, elevated BS, and back pain for the past several days; pt states her home care nurse took her vital signs this morning and her BS was 408 with her elevated BP; pt states she has some chest pressure  pt denies n/v/d  A 12-year-old female with past medical history of diabetes, hyperlipidemia, hypertension, asthma and seizures; presents for evaluation of elevated blood pressure and glucose  Patient reports yesterday she developed an episode of chest tightness which improved after using her albuterol inhaler  Patient states this morning she had recurrence of her symptoms and when her visiting nurse took her blood pressure she was reportedly found to have a diastolic BP > 412  Patient's blood glucose was also > 400 at that time  Patient states she has been compliant with all of her medications  Patient currently denies chest pain and shortness of breath  Patient's only complaint at this time is low back pain, which she states is chronic and unchanged  Patient otherwise denies headache, dizziness, lightheadedness, fever, chills, shortness of breath, abdominal pain, nausea, vomiting, diarrhea and rashes  A/P:  Chest pain, associated with elevated blood pressure and hyperglycemia at home this morning  Patient's blood pressure is currently 171/78  Patient is asymptomatic  Will check lab work for end-organ damage, hyperglycemia and DKA/HHNK  History provided by:  Patient  Hypertension   Associated symptoms: fatigue        Prior to Admission Medications   Prescriptions Last Dose Informant Patient Reported? Taking?    BASAGLAR KWIKPEN 100 units/mL injection pen   Yes No   Sig: Inject 60 Units under the skin 2 (two) times a day   SARAH MICROLET LANCETS lancets  Self Yes No   Sig: Inject 1 applicator into the skin 4 (four) times a day   Blood Glucose Monitoring Suppl (CONTOUR NEXT MONITOR) w/Device KIT  Self No No   Sig: Disp 1 meter dx E11 9, may submitted any contour next meter  If not covered let us know we can change strips   Blood Pressure Monitor KIT  Self No No   Sig: Check blood pressures BID   CONTOUR NEXT TEST test strip   No No   Sig: Test blood sugar 3x per day dx E11 9   Cholecalciferol (VITAMIN D3) 3000 units TABS  Self Yes No   Sig: Take 3,000 Units by mouth daily    HUMALOG KWIKPEN 100 units/mL injection pen   Yes No   Sig: Inject 35 Units under the skin 3 (three) times a day with meals also take 1 unit for 25 mg/dl above 150   Insulin Pen Needle (BD PEN NEEDLE DERRICK U/F) 32G X 4 MM MISC  Self Yes No   Sig: Inject 1 applicator under the skin 4 (four) times a day   albuterol (PROVENTIL HFA) 90 mcg/act inhaler  Self No No   Sig: Inhale 2 puffs every 6 (six) hours as needed for wheezing For another 24 hours use every 4 hours standing   aspirin (ADULT ASPIRIN EC LOW STRENGTH) 81 mg EC tablet   Yes No   Sig: Take 81 mg by mouth daily    atorvastatin (LIPITOR) 40 mg tablet   No No   Sig: Take 1 tablet (40 mg total) by mouth daily   budesonide-formoterol (SYMBICORT) 80-4 5 MCG/ACT inhaler  Self No No   Sig: Inhale 2 puffs 2 (two) times a day Rinse mouth after use  Patient taking differently: Inhale 2 puffs as needed Rinse mouth after use  ferrous sulfate 324 (65 Fe) mg   No No   Sig: Take 1 tablet (324 mg total) by mouth every other day   gabapentin (NEURONTIN) 100 mg capsule  Self No No   Sig: Take 1 capsule in am and 2-3 capsules at night   glucose blood test strip   Yes No   Sig: Contour Next Test Strips   hydrochlorothiazide (HYDRODIURIL) 25 mg tablet   Yes No   Sig: Take 25 mg by mouth daily    losartan (COZAAR) 100 MG tablet  Self No No   Sig: TAKE 1 TABLET BY MOUTH EVERY DAY   predniSONE 1 mg tablet   No No   Sig: Combine to take 9 mg daily  predniSONE 5 mg tablet   No No   Sig: Combine to take 9 mg daily        Facility-Administered Medications: None       Past Medical History:   Diagnosis Date    Anemia     Arthritis     Diabetes mellitus ([facility] [place] [address] )     Dyspnea on exertion     Hyperlipidemia     Hypertension     Legally blind 2010    Mild intermittent asthma with exacerbation 8/4/2018    Miscarriage     x 3    Seizures (HCC)     Sickle cell trait ([facility] [address] [facility]     Sleep apnea     Thyroid nodule 3/6/2020       Past Surgical History:   Procedure Laterality Date    CATARACT EXTRACTION Bilateral     august and september    EYE SURGERY      HYSTERECTOMY      44    OOPHORECTOMY Left     44    [place] TEMPORAL ARTERY LIGATN OR BX Bilateral 10/17/2019    Procedure: BIOPSY ARTERY TEMPORAL;  Surgeon: Nelda Lawrence DO;  [facility];  Service: Vascular       Family History   Problem Relation Age of Onset    Heart attack Mother     Heart disease Mother     Hypertension Mother     No Known Problems Father     Heart disease Sister     No Known Problems Brother     No Known Problems Son     No Known Problems Daughter     Heart attack Maternal Grandmother     Heart disease Maternal Grandmother     Diabetes Other     Heart attack Other     No Known Problems Sister     No Known Problems Sister     No Known Problems Paternal Aunt     No Known Problems Son     Cancer Neg Hx     Stroke Neg Hx      I have reviewed and agree with the history as documented  E-Cigarette/Vaping    E-Cigarette Use Never User      E-Cigarette/Vaping Substances     Social History     Tobacco Use    Smoking status: Never Smoker    Smokeless tobacco: Never Used   Substance Use Topics    Alcohol use: Never     Alcohol/week: 0 0 standard drinks     Frequency: Never     Drinks per session: Patient refused     Binge frequency: Never    Drug use: No       Review of Systems   Constitutional: Positive for fatigue  Respiratory: Positive for chest tightness  Musculoskeletal: Positive for back pain  All other systems reviewed and are negative        Physical Exam  Physical Exam  General Appearance: alert and oriented, nad, non toxic appearing  Skin:  Warm, dry, intact  HEENT: atraumatic, normocephalic  Neck: Supple, trachea midline  Cardiac: RRR; no murmurs, rub, gallops  Pulmonary: lungs CTAB; no wheezes, rales, rhonchi  Gastrointestinal: abdomen soft, nontender, nondistended; no guarding or rebound tenderness; good bowel sounds, no mass or bruits  Extremities:  Midline lumbar spinal tenderness    2+ pitting edema to bilateral lower extremities, 2+ pulses; no calf tenderness, no clubbing, no cyanosis  Neuro:  no focal motor or sensory deficits, CN 2-12 grossly intact  Psych:  Normal mood and affect, normal judgement and insight      Vital Signs  ED Triage Vitals [03/05/20 1009]   Temperature Pulse Respirations Blood Pressure SpO2   98 6 °F (37 °C) 93 18 (!) 171/78 95 %      Temp Source Heart Rate Source Patient Position - Orthostatic VS BP Location FiO2 (%)   Oral Monitor Lying Right arm --      Pain Score       5           Vitals:    03/05/20 2325 03/06/20 0700 03/06/20 1100 03/06/20 1509   BP: 145/82 145/78 130/76 135/78   Pulse: 85 75 78 75   Patient Position - Orthostatic VS: Sitting Sitting Lying Lying         Visual Acuity      ED Medications  Medications   methylPREDNISolone sodium succinate (Solu-MEDROL) 125 MG injection **ADS Override Pull** (has no administration in time range)   albuterol (5 mg/mL) 0 5 % inhalation solution **ADS Override Pull** (has no administration in time range)   albuterol (2 5 mg/3 mL) 0 083 % inhalation solution **ADS Override Pull** (has no administration in time range)   docusate sodium (COLACE) capsule 100 mg (100 mg Oral Given 3/6/20 0855)   senna (SENOKOT) tablet 8 6 mg (8 6 mg Oral Refused 3/6/20 0853)   ondansetron (ZOFRAN) injection 4 mg (has no administration in time range)   aluminum-magnesium hydroxide-simethicone (MYLANTA) 200-200-20 mg/5 mL oral suspension 30 mL (has no administration in time range)   heparin (porcine) subcutaneous injection 5,000 Units (5,000 Units Subcutaneous Refused 3/6/20 1645)   acetaminophen (TYLENOL) tablet 650 mg (has no administration in time range)   insulin lispro (HumaLOG) 100 units/mL subcutaneous injection 2-12 Units (2 Units Subcutaneous Not Given 3/6/20 1140)   cholecalciferol (VITAMIN D3) tablet 3,000 Units (3,000 Units Oral Given 3/6/20 0855)   albuterol (PROVENTIL HFA,VENTOLIN HFA) inhaler 2 puff (has no administration in time range)   budesonide-formoterol (SYMBICORT) 80-4 5 MCG/ACT inhaler 2 puff (2 puffs Inhalation Given 3/6/20 0855)   losartan (COZAAR) tablet 100 mg (100 mg Oral Given 3/6/20 0855)   aspirin (ECOTRIN LOW STRENGTH) EC tablet 81 mg (81 mg Oral Given 3/6/20 0853)   hydrochlorothiazide (HYDRODIURIL) tablet 25 mg (25 mg Oral Given 3/6/20 0855)   atorvastatin (LIPITOR) tablet 40 mg (40 mg Oral Refused 3/6/20 1645)   gabapentin (NEURONTIN) capsule 100 mg (100 mg Oral Given 3/6/20 0855)   predniSONE tablet 9 mg (9 mg Oral Given 3/6/20 0853)   insulin lispro (HumaLOG) 100 units/mL subcutaneous injection 35 Units (35 Units Subcutaneous Given 3/6/20 1315)   insulin glargine (LANTUS) subcutaneous injection 60 Units 0 6 mL (60 Units Subcutaneous Given 3/6/20 0904)   iohexol (OMNIPAQUE) 350 MG/ML injection (MULTI-DOSE) 85 mL (85 mL Intravenous Given 3/6/20 1102)       Diagnostic Studies  Results Reviewed     Procedure Component Value Units Date/Time    Fingerstick Glucose (POCT) [[id number]]  (Abnormal) Collected:  03/05/20 1655    Lab Status:  Final result Updated:  03/05/20 1720     POC Glucose 288 mg/dl     Troponin I [[id number]]  (Abnormal) Collected:  03/05/20 1401    Lab Status:  Final result Specimen:  Blood from Arm, Right Updated:  03/05/20 1434     Troponin I 0 05 ng/mL     Fingerstick Glucose (POCT) [[id number]]  (Abnormal) Collected:  03/05/20 1316    Lab Status:  Final result Updated:  03/05/20 1317     POC Glucose 211 mg/dl     Troponin I [[id number]]  (Normal) Collected:  03/05/20 1046    Lab Status:  Final result Specimen:  Blood from Arm, Right Updated:  03/05/20 1114     Troponin I 0 03 ng/mL     Beta Hydroxybutyrate [[id number]]  (Normal) Collected:  03/05/20 1046    Lab Status:  Final result Specimen:  Blood from Arm, Right Updated:  03/05/20 1106     BETA-HYDROXYBUTYRATE 0 1 mmol/L     Basic metabolic panel [[id number]]  (Abnormal) Collected:  03/05/20 1046    Lab Status:  Final result Specimen:  Blood from Arm, Right Updated:  03/05/20 1106     Sodium 139 mmol/L      Potassium 3 3 mmol/L      Chloride 100 mmol/L      CO2 32 mmol/L      ANION GAP 7 mmol/L      BUN 14 mg/dL      Creatinine 0 98 mg/dL      Glucose 226 mg/dL      Calcium 9 5 mg/dL      eGFR 72 ml/min/1 73sq m     Narrative:       [place] guidelines for Chronic Kidney Disease (CKD):     Stage 1 with normal or high GFR (GFR > 90 mL/min/1 73 square meters)    Stage 2 Mild CKD (GFR = 60-89 mL/min/1 73 square meters)    Stage 3A Moderate CKD (GFR = 45-59 mL/min/1 73 square meters)    Stage 3B Moderate CKD (GFR = 30-44 mL/min/1 73 square meters)    Stage 4 Severe CKD (GFR = 15-29 mL/min/1 73 square meters)    Stage 5 End Stage CKD (GFR <15 mL/min/1 73 square meters)  Note: GFR calculation is accurate only with a steady state creatinine    Blood gas, venous [[id number]]  (Abnormal) Collected:  03/05/20 1046    Lab Status:  Final result Specimen:  Blood from Arm, Right Updated:  03/05/20 1057     pH, [place] 7 446     pCO2, [place] 45 0 mm Hg      pO2, [place] 69 8 mm Hg      HCO3, [place] 30 3 mmol/L      Base Excess, [place] 5 5 mmol/L      O2 Content, [place] 15 3 ml/dL      O2 HGB, VENOUS 92 9 %     CBC and differential [[id number]]  (Abnormal) Collected:  03/05/20 1046    Lab Status:  Final result Specimen:  Blood from Arm, Right Updated:  03/05/20 1052     WBC 10 84 Thousand/uL      RBC 4 54 Million/uL      Hemoglobin 10 7 g/dL      Hematocrit 35 5 %      MCV 78 fL      MCH 23 6 pg      MCHC 30 1 g/dL      RDW 18 6 %      MPV 9 7 fL      Platelets 805 Thousands/uL      nRBC 0 /100 WBCs Neutrophils Relative 73 %      Immat GRANS % 1 %      Lymphocytes Relative 20 %      Monocytes Relative 5 %      Eosinophils Relative 1 %      Basophils Relative 0 %      Neutrophils Absolute 7 96 Thousands/µL      Immature Grans Absolute 0 08 Thousand/uL      Lymphocytes Absolute 2 14 Thousands/µL      Monocytes Absolute 0 49 Thousand/µL      Eosinophils Absolute 0 13 Thousand/µL      Basophils Absolute 0 04 Thousands/µL                  CTA head and neck w wo contrast   Final Result by Paco MD Roberto Carlos (03/06 1206)      1  No acute intracranial CT abnormality  2   Cerebral atrophy and mild chronic white matter microangiopathy      3  Patent major vasculature of [place] without critical stenosis  4  Inferolateral projecting blisterlike 4 mm aneurysm arising from distal cavernous segment of left ICA and inferolaterally projecting 4 mm aneurysm arising from the junction of cavernous and supraclinoid left ICA are stable from CTA 9/20/2019       5   Unremarkable CT angiogram of the neck  6   Left thyroid lobe 1 8 cm nodule  According to guidelines published in the February 2015 white paper on incidental thyroid nodules in the Journal of the [address] of Radiology [facility]), Because the nodule is greater than 1 5 cm in size, further    characterization with thyroid ultrasound is recommended if there is no recent thyroid workup  Workstation performed: [id number]         XR chest 2 views   Final Result by Remedios Blanco MD (03/05 1108)      No acute cardiopulmonary disease              Workstation performed: [id number]                    Procedures  Procedures   ECG 12 Lead Documentation  Date/Time: today/date: 3/6/2020  Performed by: Kvng Fofana    ECG reviewed by me, the ED Provider: yes    Patient location:  ED   Previous ECG:  Compared to current, no change   Rate:  91  ECG rate assessment: normal    Rhythm: sinus rhythm    Ectopy:  none    QRS axis: Normal  Intervals: normal   Q waves: None   ST segments:  Normal  T waves: normal      Impression: Normal EKG      ECG 12 Lead Documentation  Date/Time: today/date: 3/6/2020, obtained at 13:35  Performed by: Jesus Mcmahon    ECG reviewed by me, the ED Provider: yes    Patient location:  ED   Previous ECG:  Compared to current, no change   Rate:  77  ECG rate assessment: normal    Rhythm: sinus rhythm    Ectopy:  none    QRS axis:  Normal  Intervals: normal   Q waves: None   ST segments:  Normal  T waves: normal      Impression: Normal EKG        ED Course  ED Course as of Mar 06 1731   Thu Mar 05, 2020   1225 Pt resting comfortably at this time, denying any symptoms  Pt's BP improved at this time  Labs within normal limits  No signs of DKA/HHNK  Given patient's episode of chest tightness yesterday and comorbid conditions, will obtain a 3 hour delta troponin and EKG  Patient in agreement  1436 EKG's without acute changes  Possibly due to HTN, although BP is spontaneously improving  Will proceed with admission for further monitoring  Troponin I(!): 0 05   1445 Pt and family updated on results, agreeable to admission                                    MDM      Disposition  Final diagnoses:   Elevated troponin     Time reflects when diagnosis was documented in both MDM as applicable and the Disposition within this note     Time User Action Codes Description Comment    3/5/2020  3:07 PM Jesus Mcmahon Add [R79 89] Elevated troponin     3/5/2020  6:35 PM Kenyatta Karvonen R Add [I16 0] Hypertensive urgency     3/5/2020  6:35 PM Kenyatta Karvonen R Add [R56 9] Seizures ([facility] [address] )     3/5/2020  6:35 PM Kenyatta Karvonen R Add [R94 31] Prolonged QT interval     3/5/2020  6:35 PM Kenyatta Karvonen R Add [E11 29,  R80 9,  Z79 4] Type 2 diabetes mellitus with microalbuminuria, with long-term current use of insulin ([facility] [address] )     3/6/2020  3:51 PM Angeline Hora Add [Q36 783] Migraine headache     3/6/2020  3:52 PM Angeline Hora Add [H54 3] Visual impairment in both eyes       ED Disposition     ED Disposition Condition Date/Time Comment    Admit Stable Thu Mar 5, 2020  3:07 PM Case was discussed with ROBERTO and the patient's admission status was agreed to be Admission Status: observation status to the service of Dr Corrinne Rust           Follow-up Information     Follow up With Specialties Details Why Contact Info Additional Information    Diana Dumont [facility]  Follow up  [address] [place] [address]  [phone] [address] [place] [address], [facility], Physician Assistant Schedule an appointment as soon as possible for a visit One week follow up [address]  [address] [facility]  [address]        Carol Lopez MD Endocrinology   [address] [address]  [phone]       [address] Neurosurgery Schedule an appointment as soon as possible for a visit in 1 week(s) [facility] [address]  [facility], [address] [place], [place], [address] [phone]          Current Discharge Medication List      START taking these medications    Details   magnesium oxide (MAG-OX) 400 mg Take 1 tablet (400 mg total) by mouth daily  Qty: 30 tablet, Refills: 0    Associated Diagnoses: Migraine headache      Riboflavin 400 MG TABS Take 1 tablet (400 mg total) by mouth daily  Qty: 30 tablet, Refills: 0    Associated Diagnoses: Migraine headache         CONTINUE these medications which have NOT CHANGED    Details   albuterol (PROVENTIL HFA) 90 mcg/act inhaler Inhale 2 puffs every 6 (six) hours as needed for wheezing For another 24 hours use every 4 hours standing  Qty: 6 7 g, Refills: 0    Associated Diagnoses: Mild intermittent asthma with exacerbation      aspirin (ADULT ASPIRIN EC LOW STRENGTH) 81 mg EC tablet Take 81 mg by mouth daily       atorvastatin (LIPITOR) 40 mg tablet Take 1 tablet (40 mg total) by mouth daily  Qty: 90 tablet, Refills: 1    Associated Diagnoses: Other hyperlipidemia      BASAGLAR KWIKPEN 100 units/mL injection pen Inject 60 Units under the skin 2 (two) times a day      SARAH MICROLET LANCETS lancets Inject 1 applicator into the skin 4 (four) times a day      Blood Glucose Monitoring Suppl (CONTOUR NEXT MONITOR) w/Device KIT Disp 1 meter dx E11 9, may submitted any contour next meter  If not covered let us know we can change strips  Qty: 1 kit, Refills: 1    Associated Diagnoses: 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, with long-term current use of insulin ([facility])      Blood Pressure Monitor KIT Check blood pressures BID  Qty: 1 each, Refills: 0    Associated Diagnoses: Hypertensive urgency      budesonide-formoterol (SYMBICORT) 80-4 5 MCG/ACT inhaler Inhale 2 puffs 2 (two) times a day Rinse mouth after use  Qty: 1 Inhaler, Refills: 5    Associated Diagnoses: Mild intermittent asthma with exacerbation      Cholecalciferol (VITAMIN D3) 3000 units TABS Take 3,000 Units by mouth daily       !! CONTOUR NEXT TEST test strip Test blood sugar 3x per day dx E11 9  Qty: 300 each, Refills: 1    Associated Diagnoses: Type 2 diabetes mellitus with hyperglycemia, unspecified whether long term insulin use ([facility])      ferrous sulfate 324 (65 Fe) mg Take 1 tablet (324 mg total) by mouth every other day  Qty: 45 tablet, Refills: 3    Associated Diagnoses: Microcytic anemia      gabapentin (NEURONTIN) 100 mg capsule Take 1 capsule in am and 2-3 capsules at night  Qty: 90 capsule, Refills: 0    Associated Diagnoses: Diabetic polyneuropathy associated with type 2 diabetes mellitus ([facility] [address] )      ! ! glucose blood test strip Contour Next Test Strips      HUMALOG KWIKPEN 100 units/mL injection pen Inject 35 Units under the skin 3 (three) times a day with meals also take 1 unit for 25 mg/dl above 150 hydrochlorothiazide (HYDRODIURIL) 25 mg tablet Take 25 mg by mouth daily       Insulin Pen Needle (BD PEN NEEDLE DERRICK U/F) 32G X 4 MM MISC Inject 1 applicator under the skin 4 (four) times a day      losartan (COZAAR) 100 MG tablet TAKE 1 TABLET BY MOUTH EVERY DAY  Qty: 30 tablet, Refills: 5    Associated Diagnoses: Essential hypertension      ! ! predniSONE 1 mg tablet Combine to take 9 mg daily  Qty: 120 tablet, Refills: 2    Associated Diagnoses: Polymyalgia rheumatica ([address] )      ! ! predniSONE 5 mg tablet Combine to take 9 mg daily  Qty: 30 tablet, Refills: 2    Associated Diagnoses: PMR (polymyalgia rheumatica) ([facility])       ! ! - Potential duplicate medications found  Please discuss with provider  Outpatient Discharge Orders   Ambulatory Referral to Home Health   Standing Status: Future Standing Exp   Date: 03/06/21      Discharge Diet     No strenuous exercise     Call provider for:  persistent dizziness or light-headedness       PDMP Review     None          ED Provider  Electronically Signed by           Amelia Sargent DO  03/06/20 9157

## 2020-03-06 ENCOUNTER — APPOINTMENT (OUTPATIENT)
Dept: CT IMAGING | Facility: HOSPITAL | Age: 60
End: 2020-03-06
Payer: COMMERCIAL

## 2020-03-06 VITALS
HEART RATE: 75 BPM | WEIGHT: 293 LBS | OXYGEN SATURATION: 98 % | DIASTOLIC BLOOD PRESSURE: 78 MMHG | TEMPERATURE: 98 F | SYSTOLIC BLOOD PRESSURE: 135 MMHG | BODY MASS INDEX: 45.59 KG/M2 | RESPIRATION RATE: 20 BRPM

## 2020-03-06 PROBLEM — E04.1 THYROID NODULE: Status: ACTIVE | Noted: 2020-03-06

## 2020-03-06 PROBLEM — G44.009 HEADACHES, CLUSTER: Status: ACTIVE | Noted: 2020-03-06

## 2020-03-06 LAB
ALBUMIN SERPL BCP-MCNC: 3 G/DL (ref 3.5–5)
ALP SERPL-CCNC: 142 U/L (ref 46–116)
ALT SERPL W P-5'-P-CCNC: 16 U/L (ref 12–78)
ANION GAP SERPL CALCULATED.3IONS-SCNC: 11 MMOL/L (ref 4–13)
AST SERPL W P-5'-P-CCNC: 9 U/L (ref 5–45)
BASOPHILS # BLD AUTO: 0.04 THOUSANDS/ΜL (ref 0–0.1)
BASOPHILS NFR BLD AUTO: 0 % (ref 0–1)
BILIRUB SERPL-MCNC: 0.48 MG/DL (ref 0.2–1)
BUN SERPL-MCNC: 15 MG/DL (ref 5–25)
CALCIUM SERPL-MCNC: 9.2 MG/DL (ref 8.3–10.1)
CHLORIDE SERPL-SCNC: 103 MMOL/L (ref 100–108)
CO2 SERPL-SCNC: 28 MMOL/L (ref 21–32)
CREAT SERPL-MCNC: 0.94 MG/DL (ref 0.6–1.3)
EOSINOPHIL # BLD AUTO: 0.29 THOUSAND/ΜL (ref 0–0.61)
EOSINOPHIL NFR BLD AUTO: 2 % (ref 0–6)
ERYTHROCYTE [DISTWIDTH] IN BLOOD BY AUTOMATED COUNT: 18.6 % (ref 11.6–15.1)
GFR SERPL CREATININE-BSD FRML MDRD: 76 ML/MIN/1.73SQ M
GLUCOSE P FAST SERPL-MCNC: 94 MG/DL (ref 65–99)
GLUCOSE SERPL-MCNC: 107 MG/DL (ref 65–140)
GLUCOSE SERPL-MCNC: 64 MG/DL (ref 65–140)
GLUCOSE SERPL-MCNC: 78 MG/DL (ref 65–140)
GLUCOSE SERPL-MCNC: 94 MG/DL (ref 65–140)
HCT VFR BLD AUTO: 35.8 % (ref 34.8–46.1)
HGB BLD-MCNC: 10.6 G/DL (ref 11.5–15.4)
IMM GRANULOCYTES # BLD AUTO: 0.08 THOUSAND/UL (ref 0–0.2)
IMM GRANULOCYTES NFR BLD AUTO: 1 % (ref 0–2)
LYMPHOCYTES # BLD AUTO: 4.71 THOUSANDS/ΜL (ref 0.6–4.47)
LYMPHOCYTES NFR BLD AUTO: 38 % (ref 14–44)
MAGNESIUM SERPL-MCNC: 1.9 MG/DL (ref 1.6–2.6)
MCH RBC QN AUTO: 23.3 PG (ref 26.8–34.3)
MCHC RBC AUTO-ENTMCNC: 29.6 G/DL (ref 31.4–37.4)
MCV RBC AUTO: 79 FL (ref 82–98)
MONOCYTES # BLD AUTO: 0.61 THOUSAND/ΜL (ref 0.17–1.22)
MONOCYTES NFR BLD AUTO: 5 % (ref 4–12)
NEUTROPHILS # BLD AUTO: 6.82 THOUSANDS/ΜL (ref 1.85–7.62)
NEUTS SEG NFR BLD AUTO: 54 % (ref 43–75)
NRBC BLD AUTO-RTO: 0 /100 WBCS
PHOSPHATE SERPL-MCNC: 3.4 MG/DL (ref 2.3–4.1)
PLATELET # BLD AUTO: 325 THOUSANDS/UL (ref 149–390)
PMV BLD AUTO: 10 FL (ref 8.9–12.7)
POTASSIUM SERPL-SCNC: 3.1 MMOL/L (ref 3.5–5.3)
PROT SERPL-MCNC: 7.2 G/DL (ref 6.4–8.2)
RBC # BLD AUTO: 4.55 MILLION/UL (ref 3.81–5.12)
SODIUM SERPL-SCNC: 142 MMOL/L (ref 136–145)
TROPONIN I SERPL-MCNC: 0.03 NG/ML
WBC # BLD AUTO: 12.55 THOUSAND/UL (ref 4.31–10.16)

## 2020-03-06 PROCEDURE — 83735 ASSAY OF MAGNESIUM: CPT | Performed by: INTERNAL MEDICINE

## 2020-03-06 PROCEDURE — 70498 CT ANGIOGRAPHY NECK: CPT

## 2020-03-06 PROCEDURE — 84100 ASSAY OF PHOSPHORUS: CPT | Performed by: INTERNAL MEDICINE

## 2020-03-06 PROCEDURE — 99214 OFFICE O/P EST MOD 30 MIN: CPT | Performed by: PSYCHIATRY & NEUROLOGY

## 2020-03-06 PROCEDURE — 99217 PR OBSERVATION CARE DISCHARGE MANAGEMENT: CPT | Performed by: INTERNAL MEDICINE

## 2020-03-06 PROCEDURE — 97166 OT EVAL MOD COMPLEX 45 MIN: CPT

## 2020-03-06 PROCEDURE — 82948 REAGENT STRIP/BLOOD GLUCOSE: CPT

## 2020-03-06 PROCEDURE — 97163 PT EVAL HIGH COMPLEX 45 MIN: CPT

## 2020-03-06 PROCEDURE — 93005 ELECTROCARDIOGRAM TRACING: CPT

## 2020-03-06 PROCEDURE — 85025 COMPLETE CBC W/AUTO DIFF WBC: CPT | Performed by: INTERNAL MEDICINE

## 2020-03-06 PROCEDURE — 84484 ASSAY OF TROPONIN QUANT: CPT | Performed by: INTERNAL MEDICINE

## 2020-03-06 PROCEDURE — 80053 COMPREHEN METABOLIC PANEL: CPT | Performed by: INTERNAL MEDICINE

## 2020-03-06 PROCEDURE — 70496 CT ANGIOGRAPHY HEAD: CPT

## 2020-03-06 RX ORDER — RIBOFLAVIN (VITAMIN B2) 400 MG
400 TABLET ORAL DAILY
Qty: 30 TABLET | Refills: 0 | Status: SHIPPED | OUTPATIENT
Start: 2020-03-06

## 2020-03-06 RX ADMIN — INSULIN LISPRO 35 UNITS: 100 INJECTION, SOLUTION INTRAVENOUS; SUBCUTANEOUS at 13:15

## 2020-03-06 RX ADMIN — BUDESONIDE AND FORMOTEROL FUMARATE DIHYDRATE 2 PUFF: 80; 4.5 AEROSOL RESPIRATORY (INHALATION) at 08:55

## 2020-03-06 RX ADMIN — IOHEXOL 85 ML: 350 INJECTION, SOLUTION INTRAVENOUS at 11:02

## 2020-03-06 RX ADMIN — PREDNISONE 9 MG: 2.5 TABLET ORAL at 08:53

## 2020-03-06 RX ADMIN — MELATONIN 3000 UNITS: at 08:55

## 2020-03-06 RX ADMIN — DOCUSATE SODIUM 100 MG: 100 CAPSULE, LIQUID FILLED ORAL at 08:55

## 2020-03-06 RX ADMIN — LOSARTAN POTASSIUM 100 MG: 50 TABLET, FILM COATED ORAL at 08:55

## 2020-03-06 RX ADMIN — HEPARIN SODIUM 5000 UNITS: 5000 INJECTION INTRAVENOUS; SUBCUTANEOUS at 05:36

## 2020-03-06 RX ADMIN — INSULIN LISPRO 35 UNITS: 100 INJECTION, SOLUTION INTRAVENOUS; SUBCUTANEOUS at 08:56

## 2020-03-06 RX ADMIN — INSULIN GLARGINE 60 UNITS: 100 INJECTION, SOLUTION SUBCUTANEOUS at 09:04

## 2020-03-06 RX ADMIN — GABAPENTIN 100 MG: 100 CAPSULE ORAL at 08:55

## 2020-03-06 RX ADMIN — ASPIRIN 81 MG: 81 TABLET, COATED ORAL at 08:53

## 2020-03-06 RX ADMIN — HYDROCHLOROTHIAZIDE 25 MG: 25 TABLET ORAL at 08:55

## 2020-03-06 NOTE — PHYSICAL THERAPY NOTE
PHYSICAL THERAPY EVALUATION      Patient Name: Yoel Goodson  [id number] Date: 3/6/2020   PT EVALUATION    62 y o     [id number]    Chest pain [R07 9]  Elevated troponin [R79 89]    Past Medical History:   Diagnosis Date    Anemia     Arthritis     Diabetes mellitus ([facility] )     Dyspnea on exertion     Hyperlipidemia     Hypertension     Legally blind 2010    Mild intermittent asthma with exacerbation 8/4/2018    Miscarriage     x 3    Seizures (HCC)     Sickle cell trait ([facility] )     Sleep apnea      Past Surgical History:   Procedure Laterality Date    CATARACT EXTRACTION Bilateral     august and september    EYE SURGERY      HYSTERECTOMY      39    OOPHORECTOMY Left     37    CT TEMPORAL ARTERY LIGATN OR BX Bilateral 10/17/2019    Procedure: BIOPSY ARTERY TEMPORAL;  Surgeon: Zana Hood DO;  Location: [facility];  Service: Vascular        03/06/20 0948   Note Type   Note type Eval only   Pain Assessment   Pain Assessment 0-10   Pain Score 5   Pain Type Chronic pain   Pain Location Generalized  (jonathan in UE today)   Pain Descriptors Aching;Numbness   Hospital Pain Intervention(s) Repositioned; Ambulation/increased activity; Emotional support   Response to Interventions tolerated w no change   Home Living   Type of [address] One level;Stairs to enter with rails   Bathroom Shower/Tub Tub/shower unit   Bathroom Toilet Standard   Bathroom Equipment Shower chair;Commode   [address]   Additional Comments pt resides in one level apt, 3 [address] w rail, 6+6 inside building w R rail ascending to access apt on second floor  no elevator   Prior Function   Level of [place] Independent with ADLs and functional mobility   Lives With Spouse; Son   Wilfredo Help From Family  (if needed)   ADL Assistance Independent   IADLs Independent   Falls in the last 6 months 0   Vocational On disability   Comments pta pt reports being indep, works from home and -  primarily walks as mode of tranport  use of cane "occassionally" based on sx  reports she is never alone  has HHPT 2/wk   Restrictions/Precautions   Weight Bearing Precautions Per Order No   Other Precautions Fall Risk;Visual impairment  (legally blind in bl eyes w implants)   General   Additional Pertinent History pt admitted 3/5/20 w hypertensive urgency  hx of chronic pain, generalized 2* to RA  pt denies any acute pain   Family/Caregiver Present No   Cognition   Overall Cognitive Status WFL   Arousal/Participation Cooperative   Attention Attends with cues to redirect   Orientation Level Oriented X4   Memory Within functional limits   Following Commands Follows one step commands without difficulty   Comments pt pleasant, occ cues to redirect as pt easily distracted by conversation   RLE Assessment   RLE Assessment WFL  (grossly 4+/5)   LLE Assessment   LLE Assessment WFL  (grossly 4+/5)   Coordination   Movements are Fluid and Coordinated 1   Sensation WFL   Light Touch   RLE Light Touch Grossly intact   LLE Light Touch Grossly intact   Transfers   Sit to Stand 6  Modified independent   Additional items Armrests; Increased time required; Other  (SPC)   Stand to Sit 6  Modified independent   Additional items Armrests; Increased time required; Other  (SPC)   Stand pivot 7  Independent   Additional items Other; Increased time required  [facility])   Additional Comments increased time to complete transf, use of armrests and SPC for support   Ambulation/Elevation   Gait pattern Short stride; Excessively slow; Wide EUGENIE   Gait Assistance 7  Independent   Assistive Device Straight cane   Distance >200'   Stair Management Assistance Not tested  (pt decline stair trial on IE)   Balance   Static Sitting Good   Dynamic Sitting Fair +   Static Standing Fair +   Dynamic Standing Fair   Ambulatory Fair   Endurance Deficit   Endurance Deficit No   Activity Tolerance   Activity Tolerance Patient tolerated treatment well   Medical Staff Made Aware OT   Nurse Made Aware RN; cleared for therapy   Assessment   Prognosis Good   Problem List Decreased strength; Impaired balance;Decreased mobility; Impaired vision;Pain;Obesity   Assessment Jazmyn Andrews is a 62 y o  female admitted to [facility] on 3/5/2020 for Hypertensive urgency  Pt  has a past medical history of Anemia, Arthritis, Diabetes mellitus ([facility] [address] ), Dyspnea on exertion, Hyperlipidemia, Hypertension, Legally blind (2010), Seizures ([facility] ), Sickle cell trait ([facility] )  PT was consulted and pt was seen on 3/6/2020 for mobility assessment and d/c planning  Pt presents medium fall risk, monitoring abn labs and vitals  Pt lives with her  and son in a one level apt with 3 [address] and 6+6 steps to access apt with full bed and bath  Prior to admission pt reports being independent for transfers, elevations and ambulation using a [address] as needed based on chronic pain sx and received no A for IADLs and ADLs  Pt reports she has good familial support if needed  Pt is currently functioning at a modified independent assistance level for transfers, independent level for ambulation with [address]  Pt demonstrated no significant changes from baseline and currently does not need skilled IP PT at this time  Encourage pt to cont to mobilize in room and on unit w supervision during hospital stay  At this time PT recommendations for d/c are home w family support and continued HHPT per pt request to address problem list as outline in flowsheet     Barriers to Discharge None   Goals   Patient Goals to go home   Plan   PT Frequency   (d/c PT)   Recommendation   Recommendation Home with family support;Home PT  (continue HHPT per pt request)   Equipment Recommended Cane  (cont use of cane prn)   PT - OK to Discharge Yes   Additional Comments when medically stable   Modified [place] Scale   Modified [place] Scale 2   Barthel Index   Feeding 10   Bathing 5   Grooming Score 5   Dressing Score 10   Bladder Score 10   Bowels Score 10   Toilet Use Score 10   Transfers (Bed/Chair) Score 15   Mobility (Level Surface) Score 15   Stairs Score 0  (not tested on IE)   Barthel Index Score 90   History: co - morbidities, age, Social background (+stairs, support at home), fall risk, use of assistive device prn, cognition (good judgement, safety awareness)  Exam: impairments in systems including musculoskeletal (ROM, strength, posture), neuromuscular (balance, gait, transfers, motor function), joint integrity (generalized OA), cognition  Clinical: unstable/unpredictable  Complexity:high      Tomas Briseno, PT

## 2020-03-06 NOTE — ASSESSMENT & PLAN NOTE
Lab Results   Component Value Date    HGBA1C 9 3 (H) 12/06/2019       Recent Labs     03/05/20  1316 03/05/20  1655   POCGLU 211* 288*       Blood Sugar Average: Last 72 hrs:  (P) 249 5   Type 2 diabetes we will start use elevations Lantus 60 units twice a day  35 units of lispro t i d    Continue was a sliding scale  Consider endocrinology consult if her sugars are not under control  She educated patient regarding weight loss and exercise

## 2020-03-06 NOTE — PLAN OF CARE
Problem: PAIN - ADULT  Goal: Verbalizes/displays adequate comfort level or baseline comfort level  Description  Interventions:  - Encourage patient to monitor pain and request assistance  - Assess pain using appropriate pain scale  - Administer analgesics based on type and severity of pain and evaluate response  - Implement non-pharmacological measures as appropriate and evaluate response  - Consider cultural and social influences on pain and pain management  - Notify physician/advanced practitioner if interventions unsuccessful or patient reports new pain  Outcome: Progressing     Problem: INFECTION - ADULT  Goal: Absence or prevention of progression during hospitalization  Description  INTERVENTIONS:  - Assess and monitor for signs and symptoms of infection  - Monitor lab/diagnostic results  - Monitor all insertion sites, i e  indwelling lines, tubes, and drains  - Monitor endotracheal if appropriate and nasal secretions for changes in amount and color  - [place] appropriate cooling/warming therapies per order  - Administer medications as ordered  - Instruct and encourage patient and family to use good hand hygiene technique  - Identify and instruct in appropriate isolation precautions for identified infection/condition  Outcome: Progressing  Goal: Absence of fever/infection during neutropenic period  Description  INTERVENTIONS:  - Monitor WBC    Outcome: Progressing     Problem: SAFETY ADULT  Goal: Patient will remain free of falls  Description  INTERVENTIONS:  - Assess patient frequently for physical needs  -  Identify cognitive and physical deficits and behaviors that affect risk of falls    -  [place] fall precautions as indicated by assessment   - Educate patient/family on patient safety including physical limitations  - Instruct patient to call for assistance with activity based on assessment  - Modify environment to reduce risk of injury  - Consider OT/PT consult to assist with strengthening/mobility  Outcome: Progressing  Goal: Maintain or return to baseline ADL function  Description  INTERVENTIONS:  -  Assess patient's ability to carry out ADLs; assess patient's baseline for ADL function and identify physical deficits which impact ability to perform ADLs (bathing, care of mouth/teeth, toileting, grooming, dressing, etc )  - Assess/evaluate cause of self-care deficits   - Assess range of motion  - Assess patient's mobility; develop plan if impaired  - Assess patient's need for assistive devices and provide as appropriate  - Encourage maximum independence but intervene and supervise when necessary  - Involve family in performance of ADLs  - Assess for home care needs following discharge   - Consider OT consult to assist with ADL evaluation and planning for discharge  - Provide patient education as appropriate  Outcome: Progressing  Goal: Maintain or return mobility status to optimal level  Description  INTERVENTIONS:  - Assess patient's baseline mobility status (ambulation, transfers, stairs, etc )    - Identify cognitive and physical deficits and behaviors that affect mobility  - Identify mobility aids required to assist with transfers and/or ambulation (gait belt, sit-to-stand, lift, walker, cane, etc )  - [place] fall precautions as indicated by assessment  - Record patient progress and toleration of activity level on Mobility SBAR; progress patient to next Phase/Stage  - Instruct patient to call for assistance with activity based on assessment  - Consider rehabilitation consult to assist with strengthening/weightbearing, etc   Outcome: Progressing     Problem: DISCHARGE PLANNING  Goal: Discharge to home or other facility with appropriate resources  Description  INTERVENTIONS:  - Identify barriers to discharge w/patient and caregiver  - Arrange for needed discharge resources and transportation as appropriate  - Identify discharge learning needs (meds, wound care, etc )  - Arrange for interpretive services to assist at discharge as needed  - Refer to Case Management Department for coordinating discharge planning if the patient needs post-hospital services based on physician/advanced practitioner order or complex needs related to functional status, cognitive ability, or social support system  Outcome: Progressing     Problem: Knowledge Deficit  Goal: Patient/family/caregiver demonstrates understanding of disease process, treatment plan, medications, and discharge instructions  Description  Complete learning assessment and assess knowledge base    Interventions:  - Provide teaching at level of understanding  - Provide teaching via preferred learning methods  Outcome: Progressing

## 2020-03-06 NOTE — CONSULTS
Consultation - Neurology   Prashanth Ray 62 y o  female MRN: [id number]  Unit/Bed#: E4 -01 Encounter: [id number]    Assessment/Plan   Assessment:  Prashanth Ray is a 62 y o  female with two unruptured left cavernous ICA aneurysms, HTN, headaches, childhood absence seizures, legally blind since 2010 secondary to DM, HLD, polymyalgia rheumatica on daily prednisone, and MOHAN, who presents to [facility] for hypertension, hyperglycemia, and daily headaches  Etiology for headaches may be secondary to uncontrolled HTN vs chronic migraines vs sleep deprivation vs idiopathic intracranial hypertension  Unlikely secondary to aneurysm (not enlarging or bleeding on CTA head and neck today)  Patient has also been having "blanking out" spells with a few seconds of memory loss; concern for decreased concentration/awareness secondary to severe headaches vs sleep deprivation vs seizures  Plan:  1) Headaches  - prior labs from February 2020  · ESR: 59, 51  · CRP: 38 5, 20 9  · WNL:  LD, magnesium, phosphorus  - start magnesium 400 mg daily  - start riboflavin 400 mg PO daily  - will hold off on starting more potent preventative migraine medications at this time secondary to patient being sensitive to medications (falls asleep after taking prednisone and Tylenol)  - consider outpatient LP to determine if patient has idiopathic intracranial hypertension   - recommend assessment for obstructive sleep apnea as an outpatient and regulating sleep cycles  - follow-up with neurology (Dr Duy Kimball) scheduled for 8/25/2020; patient will need a follow before this scheduled appointment  Prashanth Ray will need follow up in in 4 weeks with headache attending or advance practitioner  She will not require outpatient neurological testing  2) Two left ICA aneurysms  - CTA head and neck:  · No acute intracranial CT abnormality    · Cerebral atrophy and mild chronic white matter microangiopathy  · Patent major vasculature of [place] without critical stenosis  · Inferolateral projecting blisterlike 4 mm aneurysm arising from distal cavernous segment of left ICA and inferolaterally projecting 4 mm aneurysm arising from the junction of cavernous and supraclinoid left ICA are stable from CTA 9/20/2019  · Unremarkable CT angiogram of the neck  · Left thyroid lobe 1 8 cm nodule  According to guidelines published in the February 2015 white paper on incidental thyroid nodules in the Journal of the [address] of Radiology [facility]), Because the nodule is greater than 1 5 cm in size, further characterization with thyroid ultrasound is recommended if there is no recent thyroid workup  - first appointment with Neurosurgery scheduled for 3/18/2020    3) HTN  - normotensive goal  - management per primary team    4) Seizures  - Reported absence seizures as a child  - patient does not report any recent seizures, however patient does have episodes of "blanking out" and short episodes of memory loss for a few seconds  - not currently on any antiepileptics at home    5) DM  - hemoglobin A1c 9 3 (from 12/06/2019)  - insulin management per primary team  - glucose likely elevated secondary to daily prednisone for polymyalgia rheumatica      History of Present Illness     Reason for Consult / Principal Problem: Headache and aneurysms, elevated torponins, hypertensive urgency, seizures, prolonged QT  Hx and PE limited by: N/A  HPI: Eveline Johnson is a 62 y o   female with two unruptured left cavernous ICA aneurysms, HTN, headaches, childhood absence seizures, legally blind since 2010 secondary to DM, HLD, polymyalgia rheumatica on daily prednisone, and MOHAN, who presents to [facility] for hypertension, hyperglycemia, and daily headaches  History obtained per chart review and patient  Yesterday morning, the patient's visiting nurse had the patient come to the ED because the patient's blood pressure was 204/111 and glucose was 408    Patient has daily headaches, and was concerned that an aneurysm had ruptured secondary to hypertension  While in the ED, blood pressure initially 171/78, but gradually improved  WBC slightly elevated, with one troponin of 0 05, but remainder of CBC and CMP WNL, and troponin levels trended down  Patient began having daily headaches starting approximately 1 year ago  She was admitted in August 2018 for an asthma exacerbation and given prednisone taper  In November/December 2018, patient began having daily headaches  Headaches are described as 10/10 sharp/stabbing pain as if an "ax or saw was going into her head "  The headaches frequently starts on either the right or left temporal region with bilateral conjunctival injection (usually worse on the side of the headache), with occasional sharp stabbing periorbital pain, photophobia, lacrimation on the side of the headache, phonophobia, and occasional diplopia  Headaches are worse at night , often keeps her up all night   Patient sleeps during the day  She is legally blind since 2010 secondary to diabetes, but she notices that her vision is more blurred on the side of the headache  Patient takes Tylenol, which helps her sleep, but does not relieve the pain  Patient has not been able to work since May 2019 due to severe daily headaches  Patient is on daily prednisone secondary to polymyalgia rheumatica started by Rheumatology in September 2019  Patient also notes that she occasionally "blanks out" for a few seconds associated with memory loss  As a child she had absence seizures, but is not currently on any antiepileptics  She is not sure if these are due to falling asleep, secondary to severe headaches, or recurrence of seizures  This occurred a handful of times over the last 10 years  She had a few episodes when she was driving, when she suddenly heard a truck horn and was drifting into another keily  This has also occurred while talking on the phone  Clients will think she isn't wanting to talk to them anymore, but she does not recall cessation in her speech  Patient had her first outpatient visit with Dr Ajay Monique on 2/10/2020  During this visit, patient was hypertensive with blood pressure 201/99 with nonfocal neurologic exam   Patient was having her daily headache  Due to hypertensive urgency, patient was sent to the ED  CTA head and neck at that time revealed stable 3 mm left cavernous ICA aneurysms, generalized parenchymal volume loss, and mild cerebral chronic microangiopathic disease  On exam today, patient has bilateral conjunctival injection, worse on the left  Patient also has a 10/10 stabbing left temporal headache, pain behind the left eye, bilateral blurred vision worse than baseline, and photophobia  She has tingling in her right hand and bilateral leg weakness for years  She often feels unsteady on her feet  Denies any chest pain, shortness of breath, abdominal pain, nausea, and vomiting  Inpatient consult to Neurology  Consult performed by: Donell Rojas PA-C  Consult ordered by: Jack Trujillo MD          Review of Systems   Constitutional: Positive for fatigue  Negative for chills, diaphoresis and fever  HENT: Negative for trouble swallowing and voice change  Eyes: Positive for photophobia, redness and visual disturbance  Respiratory: Negative for chest tightness and shortness of breath  Cardiovascular: Negative for chest pain and palpitations  Gastrointestinal: Negative for abdominal pain, constipation, diarrhea, nausea and vomiting  Genitourinary: Negative for difficulty urinating and dysuria  Musculoskeletal: Positive for gait problem and neck stiffness  Negative for back pain  Neurological: Positive for seizures (Childhood absent seizures, and recent episodes of blanking out"), weakness (bilateral lower extremities), numbness (right hand) and headaches   Negative for dizziness, tremors, facial asymmetry, speech difficulty and light-headedness  Psychiatric/Behavioral: Negative for behavioral problems, confusion, decreased concentration and dysphoric mood         Historical Information   Past Medical History:   Diagnosis Date    Anemia     Arthritis     Diabetes mellitus ([facility] [address] )     Dyspnea on exertion     Hyperlipidemia     Hypertension     Legally blind 2010    Mild intermittent asthma with exacerbation 8/4/2018    Miscarriage     x 3    Seizures (HCC)     Sickle cell trait ([facility] )     Sleep apnea      Past Surgical History:   Procedure Laterality Date    CATARACT EXTRACTION Bilateral     august and september    EYE SURGERY      HYSTERECTOMY      39    OOPHORECTOMY Left     39    MS TEMPORAL ARTERY LIGATN OR BX Bilateral 10/17/2019    Procedure: BIOPSY ARTERY TEMPORAL;  Surgeon: Kirti Ford DO;  [facility]: [facility];  Service: Vascular     Social History   Social History     Substance and Sexual Activity   Alcohol Use Never    Alcohol/week: 0 0 standard drinks    Frequency: Never    Drinks per session: Patient refused    Binge frequency: Never     Social History     Substance and Sexual Activity   Drug Use No     E-Cigarette/Vaping    E-Cigarette Use Never User      E-Cigarette/Vaping Substances     Social History     Tobacco Use   Smoking Status Never Smoker   Smokeless Tobacco Never Used     Family History:   Family History   Problem Relation Age of Onset    Heart attack Mother     Heart disease Mother     Hypertension Mother     No Known Problems Father     Heart disease Sister     No Known Problems Brother     No Known Problems Son     No Known Problems Daughter     Heart attack Maternal Grandmother     Heart disease Maternal Grandmother     Diabetes Other     Heart attack Other     No Known Problems Sister     No Known Problems Sister     No Known Problems Paternal Aunt     No Known Problems Son     Cancer Neg Hx     Stroke Neg Hx        Review of previous medical records was completed  Reviewed prior notes, labs, CTA head and neck    Meds/Allergies   all current active meds have been reviewed and current meds:   Current Facility-Administered Medications   Medication Dose Route Frequency    acetaminophen (TYLENOL) tablet 650 mg  650 mg Oral Q4H PRN    albuterol (PROVENTIL HFA,VENTOLIN HFA) inhaler 2 puff  2 puff Inhalation Q6H PRN    aluminum-magnesium hydroxide-simethicone (MYLANTA) 200-200-20 mg/5 mL oral suspension 30 mL  30 mL Oral Q6H PRN    aspirin (ECOTRIN LOW STRENGTH) EC tablet 81 mg  81 mg Oral Daily    atorvastatin (LIPITOR) tablet 40 mg  40 mg Oral Daily With Dinner    budesonide-formoterol (SYMBICORT) 80-4 5 MCG/ACT inhaler 2 puff  2 puff Inhalation BID    cholecalciferol (VITAMIN D3) tablet 3,000 Units  3,000 Units Oral Daily    docusate sodium (COLACE) capsule 100 mg  100 mg Oral BID    gabapentin (NEURONTIN) capsule 100 mg  100 mg Oral BID    heparin (porcine) subcutaneous injection 5,000 Units  5,000 Units Subcutaneous Q8H [address]    hydrochlorothiazide (HYDRODIURIL) tablet 25 mg  25 mg Oral Daily    insulin glargine (LANTUS) subcutaneous injection 60 Units 0 6 mL  60 Units Subcutaneous Q12H [address]    insulin lispro (HumaLOG) 100 units/mL subcutaneous injection 2-12 Units  2-12 Units Subcutaneous TID AC    insulin lispro (HumaLOG) 100 units/mL subcutaneous injection 35 Units  35 Units Subcutaneous TID With Meals    losartan (COZAAR) tablet 100 mg  100 mg Oral Daily    ondansetron (ZOFRAN) injection 4 mg  4 mg Intravenous Q6H PRN    predniSONE tablet 9 mg  9 mg Oral Daily    senna (SENOKOT) tablet 8 6 mg  1 tablet Oral Daily       No Known Allergies    Objective   Vitals:Blood pressure 145/78, pulse 75, temperature 97 7 °F (36 5 °C), temperature source Tympanic, resp  rate 18, weight 136 kg (299 lb 13 2 oz), SpO2 97 %, not currently breastfeeding  ,Body mass index is 45 59 kg/m²      Intake/Output Summary (Last 24 hours) at 3/6/2020 7332  Last data filed at 3/5/2020 1700  Gross per 24 hour   Intake 480 ml   Output    Net 480 ml       Invasive Devices: Invasive Devices     Peripheral Intravenous Line            Peripheral IV 03/05/20 Right Antecubital less than 1 day                Physical Exam   Constitutional: She is oriented to person, place, and time  She appears well-developed and well-nourished  No distress  Obese female sitting comfortably in bedside chair in no acute distress  HENT:   Head: Normocephalic and atraumatic  Right Ear: External ear normal    Left Ear: External ear normal    Nose: Nose normal    Mouth/Throat: Oropharynx is clear and moist  No oropharyngeal exudate  Eyes: Pupils are equal, round, and reactive to light  EOM are normal    Slight ptosis of bilateral eyelids, and patient often wrinkles forehead secondary to headache pain  Bilateral conjunctival injection, worse on the left  Neck: Normal range of motion  Neck supple  Cardiovascular: Normal rate and regular rhythm  Pulmonary/Chest: Effort normal    Abdominal: Soft  Musculoskeletal: Normal range of motion  Neurological: She is alert and oriented to person, place, and time  She has a normal Finger-Nose-Finger Test    Skin: Skin is warm and dry  Capillary refill takes less than 2 seconds  She is not diaphoretic  Psychiatric: She has a normal mood and affect  Her behavior is normal  Judgment and thought content normal    Nursing note and vitals reviewed  Neurologic Exam     Mental Status   Oriented to person, place, and time  Patient alert and oriented to person, place, city, month, year  Able to follow all commands  Normal attention and concentration  No dysarthria or aphasia  Good knowledge and comprehension  Cranial Nerves     CN III, IV, VI   Pupils are equal, round, and reactive to light  Extraocular motions are normal    Pupils 3 mm, round, reactive to light bilaterally  Conjunctival injection bilaterally, worse on the left    Slight ptosis of bilateral eyelids  EOMs intact without nystagmus, but subjective blurred vision  Peripheral vision intact, but patient legally blind  Facial sensation to light touch and temperature intact  Facial expressions full and symmetric  Able to keep eyelids closed against resistance and puff out cheeks symmetrically  Palate raises symmetrically  Full strength in sternocleidomastoid and trapezius muscles  Tongue midline without fasciculations  Motor Exam   Muscle bulk: normal  Overall muscle tone: normal  Right arm pronator drift: absent  Left arm pronator drift: absent  5/5 strength in upper extremities and lower extremities bilaterally  Sensory Exam   Sensation to light touch, temperature, and vibration intact throughout  Gait, Coordination, and Reflexes     Gait  Gait: (Deferred for patient's safety)    Coordination   Finger to nose coordination: normal  Slight essential tremor when assessing pronator drift  No ataxia with finger-to-nose bilaterally  No ankle clonus bilaterally  Lab Results:   I have personally reviewed pertinent reports  , CBC:   Results from last 7 days   Lab Units 03/06/20 0443 03/05/20 1858 03/05/20  1046   WBC Thousand/uL 12 55*  --  10 84*   RBC Million/uL 4 55  --  4 54   HEMOGLOBIN g/dL 10 6*  --  10 7*   HEMATOCRIT % 35 8  --  35 5   MCV fL 79*  --  78*   PLATELETS Thousands/uL 325 321 320   , BMP/CMP:   Results from last 7 days   Lab Units 03/06/20 0443 03/05/20 1858 03/05/20  1046   SODIUM mmol/L 142  --  139   POTASSIUM mmol/L 3 1*  --  3 3*   CHLORIDE mmol/L 103  --  100   CO2 mmol/L 28  --  32   BUN mg/dL 15  --  14   CREATININE mg/dL 0 94  --  0 98   CALCIUM mg/dL 9 2  --  9 5   AST U/L 9 8  --    ALT U/L 16 15  --    ALK PHOS U/L 142* 134*  --    EGFR ml/min/1 73sq m 76  --  72       Imaging Studies: I have personally reviewed pertinent reports     and I have personally reviewed pertinent films in PACS  EKG, Pathology, and Other Studies: I have personally reviewed pertinent reports     and I have personally reviewed pertinent films in PACS  VTE Prophylaxis: Sequential compression device (Venodyne)  and Heparin

## 2020-03-06 NOTE — UTILIZATION REVIEW
Initial Clinical Review    Admission: Date/Time/Statement: Admission Orders (From admission, onward)     Ordered        03/05/20 1507  Place in Observation  Once                   Orders Placed This Encounter   Procedures    Place in Observation     Standing Status:   Standing     Number of Occurrences:   1     Order Specific Question:   Admitting Physician     Answer:   Gonzalo Bravo [[id number]]     Order Specific Question:   Level of Care     Answer:   Med Surg [[id number]]     ED Arrival Information     Expected Arrival Acuity Means of Arrival Escorted By Service Admission Type    - 3/5/2020 10:04 Urgent Ambulance [facility] ([address]) General Medicine Urgent    Arrival Complaint    back and chest pain        Chief Complaint   Patient presents with    Hypertension     pt c/o hypertension, elevated BS, and back pain for the past several days; pt states her home care nurse took her vital signs this morning and her BS was 408 with her elevated BP; pt states she has some chest pressure  pt denies n/v/d  Assessment/Plan:  61 yo female presents to ED from home with elevated BP, fatigue  Had episode of chest tightness yesterday  Per H&P notes headaches on/off and worried about aneurysm rupture  Patient is also complaining of chest discomfort while her pressures are high  In ED: /78,  K 3 3,  Glu 226, initial trop negative, second trop 0 05,  EKG w/o ischemic changes and CXR negative  Denies HA @ preseent  PMH:  HTN, Migraines, DM, DM, Brain Aneurysm  Admitted to OBS with  Hypertensive Urgency  Plan: Monitor on Tele, ACS R/O, Monitor BP    3/6 BP improved  Sinus rhythm on tele    K 3 1  Attending note pending      ED Triage Vitals [03/05/20 1009]   Temperature Pulse Respirations Blood Pressure SpO2   98 6 °F (37 °C) 93 18 (!) 171/78 95 %      Temp Source Heart Rate Source Patient Position - Orthostatic VS BP Location FiO2 (%)   Oral Monitor Lying Right arm --      Pain Score       5        Wt Readings from Last 1 Encounters:   03/06/20 136 kg (299 lb 13 2 oz)     Additional Vital Signs:     03/06/20 1509  98 °F (36 7 °C) 75 20 135/78 98 % None (Room air) Lying     03/06/20 0700  97 7 °F (36 5 °C) 75 18 145/78 97 % None (Room air) Sitting   03/05/20 2325  97 6 °F (36 4 °C) 85 18 145/82 96 % None (Room air) Sitting   03/05/20 1951  97 5 °F (36 4 °C) 75 20 149/77 98 %  Sitting   03/05/20 1700  97 5 °F (36 4 °C) 59 20 115/74 96 % None (Room air) Sitting   03/05/20 1446   81 18  96 % None (Room air)    03/05/20 1332   76 17 145/83 100 % None (Room air)    03/05/20 1229   80 18 154/80   Lying       Pertinent Labs/Diagnostic Test Results:   Results from last 7 days   Lab Units 03/06/20  0443 03/05/20  1858 03/05/20  1046   WBC Thousand/uL 12 55*  --  10 84*   HEMOGLOBIN g/dL 10 6*  --  10 7*   HEMATOCRIT % 35 8  --  35 5   PLATELETS Thousands/uL 325 321 320   NEUTROS ABS Thousands/µL 6 82  --  7 96*     Results from last 7 days   Lab Units 03/06/20  0443 03/05/20  1046   SODIUM mmol/L 142 139   POTASSIUM mmol/L 3 1* 3 3*   CHLORIDE mmol/L 103 100   CO2 mmol/L 28 32   ANION GAP mmol/L 11 7   BUN mg/dL 15 14   CREATININE mg/dL 0 94 0 98   EGFR ml/min/1 73sq m 76 72   CALCIUM mg/dL 9 2 9 5   MAGNESIUM mg/dL 1 9  --    PHOSPHORUS mg/dL 3 4  --      Results from last 7 days   Lab Units 03/06/20  0443 03/05/20  1858   AST U/L 9 8   ALT U/L 16 15   ALK PHOS U/L 142* 134*   TOTAL PROTEIN g/dL 7 2 7 2   ALBUMIN g/dL 3 0* 3 0*   TOTAL BILIRUBIN mg/dL 0 48 0 40   BILIRUBIN DIRECT mg/dL  --  0 12     Results from last 7 days   Lab Units 03/06/20  0725 03/05/20  2115 03/05/20  1655 03/05/20  1316   POC GLUCOSE mg/dl 78 234* 288* 211*     Results from last 7 days   Lab Units 03/06/20  0443 03/05/20  1046   GLUCOSE RANDOM mg/dL 94 226*     BETA-HYDROXYBUTYRATE   Date Value Ref Range Status   03/05/2020 0 1 <0 6 mmol/L Final      Results from last 7 days   Lab Units 03/05/20  1046   PH PAIGE  7 446*   PCO2 PAIGE mm Hg 45 0   PO2 PAIGE mm Hg 69 8*   HCO3 PAIGE mmol/L 30 3*   BASE EXC PAIGE mmol/L 5 5   O2 CONTENT PAIGE ml/dL 15 3   O2 HGB, VENOUS % 92 9*     Results from last 7 days   Lab Units 03/06/20  0037 03/05/20  2129 03/05/20  1820 03/05/20  1401 03/05/20  1046   TROPONIN I ng/mL 0 03 0 02 0 02 0 05* 0 03     Results from last 7 days   Lab Units 03/05/20  1858   NT-PRO BNP pg/mL 38     3/5 CXR:   No acute cardiopulmonary disease  3/5 EKG: Sinus rhythm with occasional Premature ventricular complexes    3/6 CTA Head & Neck: 1  No acute intracranial CT abnormality  2   Cerebral atrophy and mild chronic white matter microangiopathy  3  Patent major vasculature of [place] without critical stenosis  4  Inferolateral projecting blisterlike 4 mm aneurysm arising from distal cavernous segment of left ICA and inferolaterally projecting 4 mm aneurysm arising from the junction of cavernous and supraclinoid left ICA are stable from CTA 9/20/2019   5   Unremarkable CT angiogram of the neck  6   Left thyroid lobe 1 8 cm nodule   According to guidelines published in the February 2015 white paper on incidental thyroid nodules in the Journal of the [employer] of Radiology Ayah Sarah), Because the nodule is greater than 1 5 cm in size, further   characterization with thyroid ultrasound is recommended if there is no recent thyroid workup       ED Treatment:   Medication Administration from 03/05/2020 1004 to 03/05/2020 1814     None        Past Medical History:   Diagnosis Date    Anemia     Arthritis     Diabetes mellitus ([address] )     Dyspnea on exertion     Hyperlipidemia     Hypertension     Legally blind 2010    Mild intermittent asthma with exacerbation 8/4/2018    Miscarriage     x 3    Seizures (HCC)     Sickle cell trait ([address] )     Sleep apnea      Present on Admission:   Hypertensive urgency   Hyperlipidemia   Visual impairment in both eyes   Anemia   Morbid obesity ([address] )   Aneurysm ([address] )      Admitting Diagnosis: Chest pain [R07 9]  Elevated troponin [R79 89]     Age/Sex: 62 y o  female  Admission Orders:  Scheduled Medications:  Medications:  aspirin 81 mg Oral Daily   atorvastatin 40 mg Oral Daily With Dinner   budesonide-formoterol 2 puff Inhalation BID   cholecalciferol 3,000 Units Oral Daily   docusate sodium 100 mg Oral BID   gabapentin 100 mg Oral BID   heparin (porcine) 5,000 Units Subcutaneous Q8H [address]   hydrochlorothiazide 25 mg Oral Daily   insulin glargine 60 Units Subcutaneous Q12H RONI   insulin lispro 2-12 Units Subcutaneous TID AC   insulin lispro 35 Units Subcutaneous TID With Meals   losartan 100 mg Oral Daily   predniSONE 9 mg Oral Daily   senna 1 tablet Oral Daily     Continuous IV Infusions:  PRN Meds:  acetaminophen 650 mg Oral Q4H PRN   albuterol 2 puff Inhalation Q6H PRN   aluminum-magnesium hydroxide-simethicone 30 mL Oral Q6H PRN   ondansetron 4 mg Intravenous Q6H PRN     TELE  IP CONSULT TO NEUROLOGY    Network Utilization Review Department  [email or web address]  ATTENTION: Please call with any questions or concerns to [phone] and carefully listen to the prompts so that you are directed to the right person  All voicemails are confidential   Cesar Los all requests for admission clinical reviews, approved or denied determinations and any other requests to dedicated fax number below belonging to the campus where the patient is receiving treatment   List of dedicated fax numbers for the Facilities:  FACILITY NAME UR FAX NUMBER   ADMISSION DENIALS (Administrative/Medical Necessity) [phone]   [address] (Maternity/NICU/P[facility]s) [phone]   [facility] [place] [phone]   [place] [phone]   [address] [phone]

## 2020-03-06 NOTE — ASSESSMENT & PLAN NOTE
Blood pressure appropiate here  Would not make any additional changes inpatient  Can follow up as an outpatient

## 2020-03-06 NOTE — UTILIZATION REVIEW
Notification of Observation Admission/Observation Authorization Request   This is a Notification of Observation Admission for [address]  Be advised that this patient was admitted to our facility under Observation Status  Contact Josseline Rios at [phone] for additional admission information  [facility]  DEDICATED [phone]  Patient Name:   Carissa Arellano   YOB: 1960       [address]   [address]:   [address]  Tax ID: [id number]  NPI: [id number] Attending Provider/NPI: Christopher Grigsby [[id number]]   Place of Service Code: 25     Place of Service Name:  CPT Code for Observation:  On [address] / CPT [address]64   Start Date:      Discharge Date & Time: No discharge date for patient encounter  Type of Admission: Observation Status Discharge Disposition (if discharged): Home/Self Care   Patient Diagnoses: Chest pain [R07 9]  Elevated troponin [R79 89]     Orders: Admission Orders (From admission, onward)     Ordered        03/05/20 1507  Place in Observation  Once                    Assigned Utilization Review Contact: Madhuri Bean  Utilization   Network Utilization Review Department  Phone: [phone]; Fax [phone]  Email: [email or web address]   ATTENTION PAYERS: Please call the assigned Utilization  directly with any questions or concerns ALL voicemails in the department are confidential  Send all requests for admission clinical reviews, approved or denied determinations and any other requests to dedicated fax number belonging to the campus where the patient is receiving treatment

## 2020-03-06 NOTE — ASSESSMENT & PLAN NOTE
Patient has aneurysms in brain, patient is concerned about headache and elevated blood pressure  She has no headache at this time  She is concerned if there is anything we can do?   Currently has no deficits, except headache

## 2020-03-06 NOTE — ASSESSMENT & PLAN NOTE
Her visiting nurse noted to have elevated blood pressure    She also is noted to have elevated blood sugars and back pain,   In the emergency room they noted her to have blood pressure of 171/78  She was given her home medications  ED noted her troponin came back as 0 05, she is being admitted to monitor overnight  Telemetry monitor  Rule out 3 sets of troponins

## 2020-03-06 NOTE — DISCHARGE INSTRUCTIONS
Chest Pain   WHAT YOU NEED TO KNOW:   Chest pain can be caused by a range of conditions, from not serious to life-threatening  Chest pain can be a symptom of a digestive problem, such as acid reflux or a stomach ulcer  An anxiety attack or a strong emotion, such as anger, can also cause chest pain  Infection, inflammation, or a fracture in the bones or cartilage in your chest can cause pain or discomfort  Sometimes chest pain or pressure is caused by poor blood flow to your heart (angina)  Chest pain may also be caused by life-threatening conditions such as a heart attack or blood clot in your lungs  DISCHARGE INSTRUCTIONS:   Call 911 if:   · You have any of the following signs of a heart attack:      ¨ Squeezing, pressure, or pain in your chest that lasts longer than 5 minutes or returns    ¨ Discomfort or pain in your back, neck, jaw, stomach, or arm     ¨ Trouble breathing    ¨ Nausea or vomiting    ¨ Lightheadedness or a sudden cold sweat, especially with chest pain or trouble breathing    Return to the emergency department if:   · You have chest discomfort that gets worse, even with medicine  · You cough or vomit blood  · Your bowel movements are black or bloody  · You cannot stop vomiting, or it hurts to swallow  Contact your healthcare provider if:   · You have questions or concerns about your condition or care  Medicines:   · Medicines  may be given to treat the cause of your chest pain  Examples include pain medicine, anxiety medicine, or medicines to increase blood flow to your heart  · Do not take certain medicines without asking your healthcare provider first   These include NSAIDs, herbal or vitamin supplements, or hormones (estrogen or progestin)  · Take your medicine as directed  Contact your healthcare provider if you think your medicine is not helping or if you have side effects  Tell him or her if you are allergic to any medicine   Keep a list of the medicines, vitamins, and herbs you take  Include the amounts, and when and why you take them  Bring the list or the pill bottles to follow-up visits  Carry your medicine list with you in case of an emergency  Follow up with your healthcare provider within 72 hours, or as directed: You may need to return for more tests to find the cause of your chest pain  You may be referred to a specialist, such as a cardiologist or gastroenterologist  Write down your questions so you remember to ask them during your visits  Healthy living tips: The following are general healthy guidelines  If your chest pain is caused by a heart problem, your healthcare provider will give you specific guidelines to follow  · Do not smoke  Nicotine and other chemicals in cigarettes and cigars can cause lung and heart damage  Ask your healthcare provider for information if you currently smoke and need help to quit  E-cigarettes or smokeless tobacco still contain nicotine  Talk to your healthcare provider before you use these products  · Eat a variety of healthy, low-fat foods  Healthy foods include fruits, vegetables, whole-grain breads, low-fat dairy products, beans, lean meats, and fish  Ask for more information about a heart healthy diet  · Ask about activity  Your healthcare provider will tell you which activities to limit or avoid  Ask when you can drive, return to work, and have sex  Ask about the best exercise plan for you  · Maintain a healthy weight  Ask your healthcare provider how much you should weigh  Ask him or her to help you create a weight loss plan if you are overweight  · Get the flu and pneumonia vaccines  All adults should get the influenza (flu) vaccine  Get it every year as soon as it becomes available  The pneumococcal vaccine is given to adults aged 72 years or older  The vaccine is given every 5 years to prevent pneumococcal disease, such as pneumonia    © 2017 [employer] Information is for End User's use only and may not be sold, redistributed or otherwise used for commercial purposes  All illustrations and images included in CareNotes® are the copyrighted property of [employer]  or [employer]án[employer]  The above information is an  only  It is not intended as medical advice for individual conditions or treatments  Talk to your doctor, nurse or pharmacist before following any medical regimen to see if it is safe and effective for you  Acute Headache   AMBULATORY CARE:   An acute headache  is pain or discomfort that starts suddenly and gets worse quickly  You may have an acute headache only when you feel stress or eat certain foods  Other acute headache pain can happen every day, and sometimes several times a day  The cause of an acute headache may not be known  It may be triggered by stress, fatigue, hormones, food, or trauma  Common types of acute headache:   · Tension headache  is the most common type of headache  These headaches typically occur in the late afternoon and go away by evening  The pain is usually mild or moderate  You may have problems tolerating bright light or loud noise  The pain is usually across the forehead or in the back of the head, often only on one side  These headaches may occur every day  · Migraine headaches  cause moderate or severe pain  The headache generally lasts from 1 to 3 days and tends to come back  Pain is usually on only one side, but it may change sides  Migraines often occur in the temple, the back of the head, or behind the eye  The pain may throb or be sharp and steady  · A migraine with aura  means you see or feel something before a migraine  You may see a small spot surrounded by bright zigzag lines  Other signs or symptoms may follow the aura  · Cluster headache  pain is usually only on one side  It often causes severe pain, and can last for 30 minutes to 2 hours   These headaches may occur 1 or 2 times each day, more often at night  The pain may wake you  Seek care immediately if:   · You have severe pain  · You have numbness or weakness on one side of your face or body  · You have a headache that occurs after a blow to the head, a fall, or other trauma  · You have a headache, are forgetful or confused, or have trouble speaking  · You have a headache, stiff neck, and a fever  Contact your healthcare provider if:   · You have a constant headache and are vomiting  · You have a headache each day that does not get better, even after treatment  · You have changes in your headaches, or new symptoms that occur when you have a headache  · You have questions or concerns about your condition or care  Treatment:   · Medicine  may be given to decrease pain  The medicine your healthcare provider recommends will depend on the kind of headaches you have  You will need to take prescription headache medicines as directed to prevent a problem called rebound headache  These headaches happen with regular use of pain relievers for headache disorders  NSAIDs or acetaminophen may help some kinds of headaches  · Biofeedback  may help you learn how to change stress reactions  For example, you learn to slow your heart rate when you become upset  You may also learn to prevent certain headaches by combining heat with relaxation  · Cognitive behavior therapy,  or stress management, may be used with other therapies to prevent headaches  Manage your symptoms:   · Apply heat or ice  on the headache area  Use a heat or ice pack  For an ice pack, you can also put crushed ice in a plastic bag  Cover the pack or bag with a towel before you apply it to your skin  Ice and heat both help decrease pain, and heat helps decrease muscle spasms  Apply heat for 20 to 30 minutes every 2 hours  Apply ice for 15 to 20 minutes every hour  Apply heat or ice for as long and for as many days as directed  You may alternate heat and ice      · Relax your muscles  Lie down in a comfortable position and close your eyes  Relax your muscles slowly  Start at your toes and work your way up your body  · Keep a record of your headaches  Write down when your headaches start and stop  Include your symptoms and what you were doing when the headache began  Record what you ate or drank for 24 hours before the headache started  Describe the pain and where it hurts  Keep track of what you did to treat your headache and if it worked  Prevent an acute headache:   · Avoid anything that triggers an acute headache  Examples include exposure to chemicals, going to high altitude, or not getting enough sleep  Create a regular sleep routine  Go to sleep at the same time and wake up at the same time each day  Do not use electronic devices before bedtime  These may trigger a headache or prevent you from sleeping well  · Do not smoke  Nicotine and other chemicals in cigarettes and cigars can trigger an acute headache or make it worse  Ask your healthcare provider for information if you currently smoke and need help to quit  E-cigarettes or smokeless tobacco still contain nicotine  Talk to your healthcare provider before you use these products  · Limit alcohol as directed  Alcohol can trigger an acute headache or make it worse  If you have cluster headaches, do not drink alcohol during an episode  For other types of headaches, ask your healthcare provider if it is safe for you to drink alcohol  Ask how much is safe for you to drink, and how often  · Exercise as directed  Exercise can reduce tension and help with headache pain  Aim for 30 minutes of physical activity on most days of the week  Your healthcare provider can help you create an exercise plan  · Eat a variety of healthy foods  Healthy foods include fruits, vegetables, low-fat dairy products, lean meats, fish, whole grains, and cooked beans   Your healthcare provider or dietitian can help you create meals plans if you need to avoid foods that trigger headaches  Follow up with your healthcare provider as directed:  Bring your headache record with you when you see your healthcare provider  Write down your questions so you remember to ask them during your visits  © 2017 [employer] Information is for End User's use only and may not be sold, redistributed or otherwise used for commercial purposes  All illustrations and images included in CareNotes® are the copyrighted property of [employer]  or [employer] Hernández  The above information is an  only  It is not intended as medical advice for individual conditions or treatments  Talk to your doctor, nurse or pharmacist before following any medical regimen to see if it is safe and effective for you

## 2020-03-06 NOTE — ASSESSMENT & PLAN NOTE
Patient has aneurysms in brain, patient is concerned about headache and elevated blood pressure  She has no headache at this time  She is concerned if there is anything we can do?   Currently has no deficits, except headache    Repeat head CT performed, no evidence of any acute a bleed  Stable changes

## 2020-03-06 NOTE — ASSESSMENT & PLAN NOTE
Incidentally found on neck CT - will place incidental finding report    This will need to be followed up in the outpatient setting

## 2020-03-06 NOTE — DISCHARGE SUMMARY
Discharge- Yoel Goodson 1960, 62 y o  female MRN: [id number]    Unit/Bed#: E4 -01 Encounter: [id number]    Primary Care Provider: Juani Taylor PA-C   Date and time admitted to hospital: 3/5/2020 10:04 AM        Thyroid nodule  Assessment & Plan  Incidentally found on neck CT - will place incidental finding report  This will need to be followed up in the outpatient setting    Headaches, cluster  Assessment & Plan  Started on riboflavin as well as magnesium - outpatient follow-up in the Headache Clinic    Aneurysm [facility])  Assessment & Plan  Patient has aneurysms in brain, patient is concerned about headache and elevated blood pressure  She has no headache at this time  She is concerned if there is anything we can do? Currently has no deficits, except headache    Repeat head CT performed, no evidence of any acute a bleed  Stable changes    Morbid obesity ([address] )  Assessment & Plan  A educated patient regarding weight loss and exercise and eating proper food    Type 2 diabetes mellitus with microalbuminuria, with long-term current use of insulin [facility])  Assessment & Plan  Lab Results   Component Value Date    HGBA1C 9 3 (H) 12/06/2019       Recent Labs     03/05/20  1655 03/05/20  2115 03/06/20  0725 03/06/20  1140   POCGLU 288* 234* 78 107       Blood Sugar Average: Last 72 hrs:  (P) 183  6   Type 2 diabetes we will start use elevations Lantus 60 units twice a day  35 units of lispro t i d  Continue was a sliding scale  Consider endocrinology consult if her sugars are not under control  She educated patient regarding weight loss and exercise    Visual impairment in both eyes  Assessment & Plan  Patient says she is legally blind- home care arranged    Hyperlipidemia  Assessment & Plan  Was your Lipitor 40 mg daily      Anemia  Assessment & Plan  No indications for blood transfusion at this time, - close monitor    * Hypertensive urgency  Assessment & Plan  Blood pressure appropiate here   Would not make any additional changes inpatient  Can follow up as an outpatient  Admitting Provider:  Figueroa Cage MD  Discharge Provider:  Ana Haley DO  Admission Date: 3/5/2020       Discharge Date: 03/06/20   LOS: 0  Primary Care Physician at Discharge: Noemy Her 850 COURSE:  Som Cooley is a 62 y o  female who presented with atypical chest pain as well as headache and concern for mildly elevated cardiac enzyme  The patient was admitted to the general medical floor, she was evaluated in consultation by the neurology service and repeat head CT of the head and neck was performed  Findings were consistent with stable aneurysm without any significant changes  The patient was started on riboflavin as well as magnesium and will follow-up in the headache clinic  The patient had a serial set of cardiac enzymes which were within normal limits  There is no evidence of Opal Lento or tachyarrhythmias on monitoring, and it was noted that the patient has had a normal cardiac catheterization from January 20 19th with no evidence of any coronary artery disease  The patient should follow up in the outpatient setting with her primary care provider for other workup of her atypical chest pain  At the time of discharge the patient was tolerating oral diet she was without acute complaints and was stable for discharge home  She was incidentally found to have a thyroid nodule which will require outpatient follow-up  Thank you for choosing [facility] for your healthcare needs  If I can be of any assistance in the future, please feel free to contact me  DISCHARGE DIAGNOSES  Thyroid nodule  Assessment & Plan  Incidentally found on neck CT - will place incidental finding report    This will need to be followed up in the outpatient setting    Headaches, cluster  Assessment & Plan  Started on riboflavin as well as magnesium - outpatient follow-up in the Headache Clinic    Aneurysm [facility])  Assessment & Plan  Patient has aneurysms in brain, patient is concerned about headache and elevated blood pressure  She has no headache at this time  She is concerned if there is anything we can do? Currently has no deficits, except headache    Repeat head CT performed, no evidence of any acute a bleed  Stable changes    Morbid obesity ([address] )  Assessment & Plan  A educated patient regarding weight loss and exercise and eating proper food    Type 2 diabetes mellitus with microalbuminuria, with long-term current use of insulin [facility])  Assessment & Plan  Lab Results   Component Value Date    HGBA1C 9 3 (H) 12/06/2019       Recent Labs     03/05/20  1655 03/05/20  2115 03/06/20  0725 03/06/20  1140   POCGLU 288* 234* 78 107       Blood Sugar Average: Last 72 hrs:  (P) 183  6   Type 2 diabetes we will start use elevations Lantus 60 units twice a day  35 units of lispro t i d  Continue was a sliding scale  Consider endocrinology consult if her sugars are not under control  She educated patient regarding weight loss and exercise    Visual impairment in both eyes  Assessment & Plan  Patient says she is legally blind- home care arranged    Hyperlipidemia  Assessment & Plan  Was your Lipitor 40 mg daily      Anemia  Assessment & Plan  No indications for blood transfusion at this time, - close monitor    * Hypertensive urgency  Assessment & Plan  Blood pressure appropiate here  Would not make any additional changes inpatient  Can follow up as an outpatient          CONSULTING PROVIDERS   IP CONSULT TO NEUROLOGY    PROCEDURES PERFORMED  * No surgery found *    RADIOLOGY RESULTS  Cta Head And Neck W Wo Contrast    Result Date: 3/6/2020  Narrative: CTA NECK AND BRAIN WITH AND WITHOUT CONTRAST INDICATION: Dizziness, non-specific history of aneursym, and headache with uncontrolled HTN COMPARISON:   CTA head and neck 2/10/2020 TECHNIQUE:  Routine CT imaging of the Brain without contrast   Post contrast imaging was performed after administration of iodinated contrast through the neck and brain  Post contrast axial 0 625 mm images timed to opacify the arterial system  3D rendering was performed on an independent workstation  MIP reconstructions performed  Coronal reconstructions were performed of the noncontrast portion of the brain  Radiation dose length product (DLP) for this visit:  1413 mGy-cm   This examination, like all CT scans performed in the [facility], was performed utilizing techniques to minimize radiation dose exposure, including the use of iterative reconstruction and automated exposure control  IV Contrast:  85 mL of iohexol (OMNIPAQUE)  IMAGE QUALITY:   Diagnostic FINDINGS: NONCONTRAST BRAIN PARENCHYMA: No acute intracranial hemorrhage  No masslike lesion, mass effect effect or midline shift  No CT evidence for acute infarct  Cerebral atrophy  Decreased attenuation involving periventricular and subcortical white matter demonstrating an appearance that is statistically most likely to represent mild microangiopathic change VENTRICLES AND EXTRA-AXIAL SPACES:  Normal for the patient's age  VISUALIZED ORBITS AND PARANASAL SINUSES:  Unremarkable  CERVICAL VASCULATURE AORTIC ARCH AND GREAT VESSELS: Aortic arch and great vessel origins are unremarkable  RIGHT VERTEBRAL ARTERY CERVICAL SEGMENT:The vessel origin is unremarkable  The vessel is patent throughout the neck without significant stenosis  LEFT VERTEBRAL ARTERY CERVICAL SEGMENT: The vessel origin is unremarkable  The vessel is patent throughout the neck without significant stenosis  RIGHT EXTRACRANIAL CAROTID SEGMENT:There is mild noncalcified plaque at the bifurcation  No hemodynamically significant stenosis by NASCET criteria ( less than 50%) LEFT EXTRACRANIAL CAROTID SEGMENT:   There is mild noncalcified plaque at the bifurcation   No hemodynamically significant stenosis by NASCET criteria ( less than 50%) NASCET criteria was used to determine the degree of internal carotid artery diameter stenosis  INTRACRANIAL VASCULATURE INTERNAL CAROTID ARTERIES: Mild atherosclerotic disease of cavernous and supraclinoid segments of bilateral internal carotid arteries without critical stenosis  Normal ophthalmic artery origins  Inferolaterally projecting blisterlike 4 mm aneurysm arising from distal cavernous segment of left ICA (series 5 image 188)  Also inferior projecting 4 mm aneurysm arising from the junction of cavernous and supraclinoid left ICA (series 5 image 185)  These are grossly stable from CTA 9/20/2019  ANTERIOR CIRCULATION:  Absent/hypoplastic right A1  Normal left [id number]  Bilateral anterior cerebral arteries are unremarkable  Normal anterior communicating artery  MIDDLE CEREBRAL ARTERY CIRCULATION:  Bilateral M1 segments and major M2 branches are patent without significant stenosis  DISTAL VERTEBRAL ARTERIES:  Distal vertebral arteries are unremarkable  Normal right PICA origin  Left PICA origin is not well seen  with suggested common trunk AICA/PICA BASILAR ARTERY:  Basilar artery is normal in caliber  Normal superior cerebellar arteries  POSTERIOR CEREBRAL ARTERIES: Bilateral posterior cerebral arteries are unremarkable without critical stenosis  Absent/hypoplastic posterior communicating arteries DURAL VENOUS SINUSES:  Unremarkable  NON VASCULAR ANATOMY BONY STRUCTURES: No acute or aggressive appearing osseous abnormality  SOFT TISSUES OF THE NECK:  Left thyroid lobe nodule measuring up to 1 8 cm  THORACIC INLET:  There is 3 mm nodule/fissural lymph node in the partially imaged right middle lobe (series 5 image 69), stable from 8/4/2018  Impression: 1  No acute intracranial CT abnormality  2   Cerebral atrophy and mild chronic white matter microangiopathy 3  Patent major vasculature of [place] without critical stenosis  4    Inferolateral projecting blisterlike 4 mm aneurysm arising from distal cavernous segment of left ICA and inferolaterally projecting 4 mm aneurysm arising from the junction of cavernous and supraclinoid left ICA are stable from CTA 9/20/2019  5   Unremarkable CT angiogram of the neck  6   Left thyroid lobe 1 8 cm nodule  According to guidelines published in the February 2015 white paper on incidental thyroid nodules in the Journal of the [employer] of Radiology [facility]), Because the nodule is greater than 1 5 cm in size, further characterization with thyroid ultrasound is recommended if there is no recent thyroid workup  Workstation performed: [id number]     Cta Head And Neck With And Without Contrast    Result Date: 2/10/2020  Narrative: CTA NECK AND BRAIN WITH AND WITHOUT CONTRAST INDICATION: Headaches, history, evaluate for intracranial hemorrhage and aneurysm  COMPARISON:   Head CT from September 28, 2019  MRA of the head from September 23, 2019  TECHNIQUE:  Routine CT imaging of the Brain without contrast   Post contrast imaging was performed after administration of iodinated contrast through the neck and brain  Post contrast axial 0 625 mm images timed to opacify the arterial system  3D rendering was performed on an independent workstation  MIP reconstructions performed  Coronal reconstructions were performed of the noncontrast portion of the brain  Radiation dose length product (DLP) for this visit:  1544 18 mGy-cm   This examination, like all CT scans performed in the [facility], was performed utilizing techniques to minimize radiation dose exposure, including the use of iterative reconstruction and automated exposure control  IV Contrast:  100 mL of iohexol (OMNIPAQUE)  IMAGE QUALITY:   Diagnostic FINDINGS: NONCONTRAST BRAIN PARENCHYMA:  No significant interval change  No acute intracranial hemorrhage or cortical infarction  Generalized parenchymal volume loss, somewhat frontal and temporal predominant    Mild periventricular hypoattenuation suggestive of chronic microangiopathic disease  VENTRICLES AND EXTRA-AXIAL SPACES:  Moderate ventriculomegaly with commensurate dilatation of the frontal and temporal sulci  Enlarged sylvian fissures  Mild generalized cerebral sulcal prominence  VISUALIZED ORBITS AND PARANASAL SINUSES:  Unremarkable  CERVICAL VASCULATURE AORTIC ARCH AND GREAT VESSELS:  Normal aortic arch and great vessel origins  Normal visualized subclavian vessels  Bovine arch  RIGHT VERTEBRAL ARTERY CERVICAL SEGMENT:  Normal origin  The vessel is normal in caliber throughout the neck  LEFT VERTEBRAL ARTERY CERVICAL SEGMENT:  Normal origin  The vessel is normal in caliber throughout the neck  RIGHT EXTRACRANIAL CAROTID SEGMENT:  Normal caliber common carotid artery  Normal bifurcation and cervical internal carotid artery  No stenosis or dissection  LEFT EXTRACRANIAL CAROTID SEGMENT:  Normal caliber common carotid artery  Normal bifurcation and cervical internal carotid artery  No stenosis or dissection  NASCET criteria was used to determine the degree of internal carotid artery diameter stenosis  INTRACRANIAL VASCULATURE INTERNAL CAROTID ARTERIES:  Laterally projecting extradural 3 mm left cavernous ICA aneurysm noted on image 187 of series 6  Suggestion of an additional left cavernous cave aneurysm measuring 3 mm at the junction of the cavernous and supraclinoid ICA on image 93 of series 5  This is also annotated on the reformatted image (1 of 2, series 1000)  This is projecting inferiorly  The ophthalmic artery origins are well visualized bilaterally  The right intracranial carotid artery smaller than the left, likely a developmental variation  The right ICA is unremarkable in appearance otherwise  ANTERIOR CIRCULATION:  Nearly aplastic right horizontal RAUL  Left horizontal RAUL is unremarkable  Patent anterior communicating artery  Both vertical A2 segments are normal appearing as well   MIDDLE CEREBRAL ARTERY CIRCULATION:  M1 segment and middle cerebral artery branches demonstrate normal enhancement bilaterally  DISTAL VERTEBRAL ARTERIES:  Normal distal vertebral arteries  Posterior inferior cerebellar artery origins are normal  Normal vertebral basilar junction  BASILAR ARTERY:  Basilar artery is normal in caliber  Normal superior cerebellar arteries  POSTERIOR CEREBRAL ARTERIES: Both posterior cerebral arteries arises from the basilar tip  Both arteries demonstrate normal enhancement  Normal posterior communicating arteries  DURAL VENOUS SINUSES:  Normal  NON VASCULAR ANATOMY BONY STRUCTURES:  No acute osseous abnormality  SOFT TISSUES OF THE NECK:  Normal   Dystrophic calcifications within the oropharyngeal tonsils  THORACIC INLET:  Unremarkable  Impression: No acute intracranial hemorrhage  Mild cerebral chronic microangiopathic disease, presumably secondary to hypertension  Stable 3 mm left cavernous ICA aneurysms when comparing to the prior study of September 23, 2019  Generalized parenchymal volume loss, accelerated for age, somewhat temporal and frontal predominant  Correlate for underlying neurodegenerative disease or clinical signs and symptoms of NPH  Consider follow-up MR NeuroQuant imaging  3 mm extradural and cavernous cave left ICA aneurysms  Neurosurgical consultation is recommended  Workstation performed: [id number]     Xr Chest 2 Views    Result Date: 3/5/2020  Narrative: CHEST INDICATION:   chest tightness  COMPARISON:  Chest radiograph from 9/16/2019 and chest CT from 9/21/2019  EXAM PERFORMED/VIEWS:  XR CHEST PA & LATERAL FINDINGS: Cardiomediastinal silhouette appears unremarkable  The lungs are clear  No pneumothorax or pleural effusion  Osseous structures appear within normal limits for patient age  Impression: No acute cardiopulmonary disease   Workstation performed: [id number]      Kidney And Bladder    Result Date: 2/15/2020  Narrative: RENAL ULTRASOUND INDICATION:   Urinary tract infection  COMPARISON: None TECHNIQUE:   Ultrasound of the retroperitoneum was performed with a curvilinear transducer utilizing volumetric sweeps and still imaging techniques  FINDINGS: KIDNEYS: Symmetric and normal size  Right kidney:  13 6 cm  Left kidney:  13 3 cm  Right kidney Normal echogenicity and contour  Right upper pole 1 9 x 1 6 cm cyst  No hydronephrosis  No shadowing calculi  No perinephric fluid collections  Left kidney Normal echogenicity and contour  Left mid pole 2 4 x 1 6 cm cyst  Left renal pelviectasis without obvious hydronephrosis    No shadowing calculi  No perinephric fluid collections  URETERS: Nonvisualized  BLADDER: Normally distended  No focal thickening or mass lesions  Bilateral ureteral jets detected  No evidence of aneurysm in the proximal, mid or distal aorta  Impression: Bilateral renal cysts   Workstation performed: [id number]       LABS  Results from last 7 days   Lab Units 03/06/20 0443 03/05/20 1858 03/05/20  1046   WBC Thousand/uL 12 55*  --  10 84*   HEMOGLOBIN g/dL 10 6*  --  10 7*   HEMATOCRIT % 35 8  --  35 5   MCV fL 79*  --  78*   PLATELETS Thousands/uL 325 321 320     Results from last 7 days   Lab Units 03/06/20 0443 03/05/20  1858 03/05/20  1046   SODIUM mmol/L 142  --  139   POTASSIUM mmol/L 3 1*  --  3 3*   CHLORIDE mmol/L 103  --  100   CO2 mmol/L 28  --  32   BUN mg/dL 15  --  14   CREATININE mg/dL 0 94  --  0 98   CALCIUM mg/dL 9 2  --  9 5   ALBUMIN g/dL 3 0* 3 0*  --    TOTAL BILIRUBIN mg/dL 0 48 0 40  --    ALK PHOS U/L 142* 134*  --    ALT U/L 16 15  --    AST U/L 9 8  --    EGFR ml/min/1 73sq m 76  --  72   GLUCOSE RANDOM mg/dL 94  --  226*     Results from last 7 days   Lab Units 03/06/20  0037 03/05/20  2129 03/05/20  1820   TROPONIN I ng/mL 0 03 0 02 0 02     Results from last 7 days   Lab Units 03/05/20  1858   NT-PRO BNP pg/mL 38          Results from last 7 days   Lab Units 03/06/20  1140 03/06/20  0725 03/05/20  2115 03/05/20  8261 03/05/20  1316   POC GLUCOSE mg/dl 107 78 234* 288* 211*                       Cultures:         Invalid input(s): URIBILINOGEN              PHYSICAL EXAM:  Vitals:   Blood Pressure: 135/78 (03/06/20 1509)  Pulse: 75 (03/06/20 1509)  Temperature: 98 °F (36 7 °C) (03/06/20 1509)  Temp Source: Tympanic (03/06/20 1509)  Respirations: 20 (03/06/20 1509)  Weight - Scale: 136 kg (299 lb 13 2 oz) (03/06/20 0537)  SpO2: 98 % (03/06/20 1509)      General: well appearing, no acute distress  HEENT: atraumatic, PERRLA, moist mucosa, normal pharynx, normal tonsils and adenoids, normal tongue, no fluid in sinuses  Neck: Trachea midline, no carotid bruit, no masses  Respiratory: normal chest wall expansion, CTA B, no r/r/w, no rubs  Cardiovascular: RRR, no m/r/g, Normal S1 and S2  Abdomen: Soft, non-tender, non-distended, normal bowel sounds in all quadrants, no hepatosplenomegaly, no tympany  Rectal: deferred  Musculoskeletal: normal ROM in upper and lower extremities  Integumentary: warm, dry, and pink, with no rash, purpura, or petechia  Heme/Lymph: no lymphadenopathy, no bruises  Neurological: Cranial Nerves II-XII grossly intact, no tics, normal sensation to pressure and light touch  Psychiatric: cooperative with normal mood, affect, and cognition      Discharge Disposition: Home/Self Care      Test Results Pending at Discharge:           Medications   · Summary of Medication Adjustments made as a result of this hospitalization:  Riboflavin 400 IU and magnesium 400 mg once daily  · Medication Dosing Tapers - Please refer to Discharge Medication List for details on any medication dosing tapers (if applicable to patient)  · Discharge Medication List: See after visit summary for reconciled discharge medications       Diet restrictions:         Diet Orders   (From admission, onward)             Start     Ordered    03/05/20 [facility]  Room Service  Once     Question:  Type of Service  Answer:  Room Service-Appropriate    03/05/20 2791 03/05/20 1827  Diet Cardiovascular; Cardiac  Diet effective now     Question Answer Comment   Diet Type Cardiovascular    Cardiac Cardiac    RD to adjust diet per protocol? Yes        03/05/20 1829              Activity restrictions: No strenuous activity  Discharge Condition: good    Outpatient Follow-Up and Discharge Instructions  See after visit summary section titled Discharge Instructions for information provided to patient and family  Code Status: Level 1 - Full Code  Discharge Statement   I spent 35 minutes discharging the patient  This time was spent on the day of discharge  Greater than 50% of total time was spent with the patient and / or family counseling and / or coordination of care  ** Please Note: This note has been constructed using a voice recognition system   **

## 2020-03-06 NOTE — DISCHARGE INSTR - AVS FIRST PAGE
Mrs Maxim Springer,      Your seen and evaluated for headache as well as atypical chest pain  Your cardiac enzymes are negative, and you have a recent cardiac catheterization from January 2019 which is negative for epicardial disease  In addition you had accelerated hypertension which is improving  Please follow-up with your PCP for additional blood pressure management per recommendations  He will need a thyroid ultrasound within 1-2 weeks to be arranged by your PCP  In addition you should follow-up with your neurosurgeon for your known aneurysms of the brain  Neurology is recommended that you be started on riboflavin 400 IU once daily, in addition to magnesium 400 mg once daily to help with her headaches  Please follow-up in the Headache Clinic through the [facility] neurology service  Thank you for choosing [facility] for your healthcare needs  If I can be of any assistance in the future, please feel free to contact me      Dr Whit Higuera

## 2020-03-06 NOTE — CASE MANAGEMENT
Cm reviewed pt care coordination rounds with Do Lamar Smith  Pt is a tentative d/c today  A post acute care recommendation was made by your care team for [facility]  Discussed [place] [facility] with patient  List of agencies given to patient via in person  patient aware the list is custom filtered for them by preference  and that [facility] post acute providers are designated  Pt is agreeable to resume care with [facility] for HomePT  Referral made via [address]  Cm will f/u for final medical clearance and dcp needs

## 2020-03-06 NOTE — PLAN OF CARE
Problem: PAIN - ADULT  Goal: Verbalizes/displays adequate comfort level or baseline comfort level  Description  Interventions:  - Encourage patient to monitor pain and request assistance  - Assess pain using appropriate pain scale  - Administer analgesics based on type and severity of pain and evaluate response  - Implement non-pharmacological measures as appropriate and evaluate response  - Consider cultural and social influences on pain and pain management  - Notify physician/advanced practitioner if interventions unsuccessful or patient reports new pain  Outcome: Progressing     Problem: INFECTION - ADULT  Goal: Absence or prevention of progression during hospitalization  Description  INTERVENTIONS:  - Assess and monitor for signs and symptoms of infection  - Monitor lab/diagnostic results  - Monitor all insertion sites, i e  indwelling lines, tubes, and drains  - Monitor endotracheal if appropriate and nasal secretions for changes in amount and color  - [place] appropriate cooling/warming therapies per order  - Administer medications as ordered  - Instruct and encourage patient and family to use good hand hygiene technique  - Identify and instruct in appropriate isolation precautions for identified infection/condition  Outcome: Progressing  Goal: Absence of fever/infection during neutropenic period  Description  INTERVENTIONS:  - Monitor WBC    Outcome: Progressing     Problem: SAFETY ADULT  Goal: Patient will remain free of falls  Description  INTERVENTIONS:  - Assess patient frequently for physical needs  -  Identify cognitive and physical deficits and behaviors that affect risk of falls    -  [place] fall precautions as indicated by assessment   - Educate patient/family on patient safety including physical limitations  - Instruct patient to call for assistance with activity based on assessment  - Modify environment to reduce risk of injury  - Consider OT/PT consult to assist with strengthening/mobility  Outcome: Progressing  Goal: Maintain or return to baseline ADL function  Description  INTERVENTIONS:  -  Assess patient's ability to carry out ADLs; assess patient's baseline for ADL function and identify physical deficits which impact ability to perform ADLs (bathing, care of mouth/teeth, toileting, grooming, dressing, etc )  - Assess/evaluate cause of self-care deficits   - Assess range of motion  - Assess patient's mobility; develop plan if impaired  - Assess patient's need for assistive devices and provide as appropriate  - Encourage maximum independence but intervene and supervise when necessary  - Involve family in performance of ADLs  - Assess for home care needs following discharge   - Consider OT consult to assist with ADL evaluation and planning for discharge  - Provide patient education as appropriate  Outcome: Progressing  Goal: Maintain or return mobility status to optimal level  Description  INTERVENTIONS:  - Assess patient's baseline mobility status (ambulation, transfers, stairs, etc )    - Identify cognitive and physical deficits and behaviors that affect mobility  - Identify mobility aids required to assist with transfers and/or ambulation (gait belt, sit-to-stand, lift, walker, cane, etc )  - [place] fall precautions as indicated by assessment  - Record patient progress and toleration of activity level on Mobility SBAR; progress patient to next Phase/Stage  - Instruct patient to call for assistance with activity based on assessment  - Consider rehabilitation consult to assist with strengthening/weightbearing, etc   Outcome: Progressing     Problem: DISCHARGE PLANNING  Goal: Discharge to home or other facility with appropriate resources  Description  INTERVENTIONS:  - Identify barriers to discharge w/patient and caregiver  - Arrange for needed discharge resources and transportation as appropriate  - Identify discharge learning needs (meds, wound care, etc )  - Arrange for interpretive services to assist at discharge as needed  - Refer to Case Management Department for coordinating discharge planning if the patient needs post-hospital services based on physician/advanced practitioner order or complex needs related to functional status, cognitive ability, or social support system  Outcome: Progressing     Problem: Knowledge Deficit  Goal: Patient/family/caregiver demonstrates understanding of disease process, treatment plan, medications, and discharge instructions  Description  Complete learning assessment and assess knowledge base    Interventions:  - Provide teaching at level of understanding  - Provide teaching via preferred learning methods  Outcome: Progressing

## 2020-03-06 NOTE — ASSESSMENT & PLAN NOTE
Lab Results   Component Value Date    HGBA1C 9 3 (H) 12/06/2019       Recent Labs     03/05/20  1655 03/05/20  2115 03/06/20  0725 03/06/20  1140   POCGLU 288* 234* 78 107       Blood Sugar Average: Last 72 hrs:  (P) 183  6   Type 2 diabetes we will start use elevations Lantus 60 units twice a day  35 units of lispro t i d    Continue was a sliding scale  Consider endocrinology consult if her sugars are not under control  She educated patient regarding weight loss and exercise

## 2020-03-06 NOTE — OCCUPATIONAL THERAPY NOTE
Occupational Therapy Evaluation ([id number])     Patient Name: Wes Olmstead  HXKJD'I Date: 3/6/2020  Problem List  Principal Problem:    Hypertensive urgency  Active Problems:    Anemia    Hyperlipidemia    Visual impairment in both eyes    Type 2 diabetes mellitus with microalbuminuria, with long-term current use of insulin ([facility] )    Morbid obesity ([facility] )    Aneurysm ([facility] )    Past Medical History  Past Medical History:   Diagnosis Date    Anemia     Arthritis     Diabetes mellitus ([facility] )     Dyspnea on exertion     Hyperlipidemia     Hypertension     Legally blind 2010    Mild intermittent asthma with exacerbation 8/4/2018    Miscarriage     x 3    Seizures (HCC)     Sickle cell trait ([facility] )     Sleep apnea      Past Surgical History  Past Surgical History:   Procedure Laterality Date    CATARACT EXTRACTION Bilateral     august and september    EYE SURGERY      HYSTERECTOMY      39    OOPHORECTOMY Left     39    FL TEMPORAL ARTERY LIGATN OR BX Bilateral 10/17/2019    Procedure: BIOPSY ARTERY TEMPORAL;  Surgeon: DO Mara;  [facility]: [facility];  Service: Vascular             03/06/20 0950   Note Type   Note type Eval only   Restrictions/Precautions   Weight Bearing Precautions Per Order No   Other Precautions Fall Risk;Pain;Visual impairment   Pain Assessment   Pain Assessment 0-10   Pain Score 5   Pain Type Chronic pain   Pain Location Generalized   Home Living   Type of [address] One level;Stairs to enter with rails  (3+6+6 JESSICA with rails)   Bathroom Shower/Tub Tub/shower unit   Bathroom Toilet Standard   Bathroom Equipment Shower chair;Commode   Bathroom Accessibility [place]   Prior Function   Level of [place] Independent with ADLs and functional mobility   Lives With Spouse; Son   [place] [address] Help From Family   ADL Assistance Independent   IADLs Independent   Falls in the last 6 months 0   Vocational On disability   Lifestyle   Autonomy PTA pt reports independence for ADLs, functional transfers, and functional mobility with SPC as needed  Pt is -; -falls; -alone  Pt reports she is receiving home PT 2 x/wk     Reciprocal Relationships Son   Service to Others worked for [employer]   Intrinsic Gratification playing cards, going to the [employer], dancing   Psychosocial   Psychosocial (WDL) WDL   ADL   Where Assessed Chair   Eating Assistance 7  Independent   Grooming Assistance 7  Independent   UB Bathing Assistance 6  Modified Independent   LB [address] 6  [address] [facility] 6  Modified independent   [address] 6  Modified independent   Bed Mobility   Additional Comments Pt seated in chair at beginning of session   Transfers   Sit to [facility]   Additional items Increased time required   Stand to Sit 7  Independent   Additional items Increased time required   Functional Mobility   Functional Mobility 5  Supervision   Additional items [facility]   Balance   Static Sitting Good   Dynamic Sitting Fair +   Static Standing Fair +   Dynamic Standing Fair   Activity Tolerance   Activity Tolerance Patient limited by fatigue   Medical Staff Made Aware Nsg, PT   RUE Assessment   RUE Assessment WFL   RUE Strength   RUE Overall Strength Within Functional Limits - able to perform ADL tasks with strength  (4/5 throughout)   LUE Assessment   LUE Assessment WFL   LUE Strength   LUE Overall Strength Within Functional Limits - able to perform ADL tasks with strength  (4/5 throughout)   Hand Function   Gross Motor Coordination Functional   Fine Motor Coordination Functional   Sensation   Light Touch No apparent deficits   Proprioception   Proprioception No apparent deficits   Vision-Basic Assessment   Current Vision Wears glasses only for reading   Visual History   (Pt is legally blind but has B/L implants)   Vision - Complex Assessment   Acuity Able to read clock/calendar on wall without difficulty Perception   Inattention/Neglect Appears intact   Cognition   Overall Cognitive Status WFL   Arousal/Participation Alert; Cooperative   Attention Within functional limits   Orientation Level Oriented X4   Memory Within functional limits   Following Commands Follows all commands and directions without difficulty   Assessment   Limitation Decreased endurance   Prognosis Fair   Assessment Pt is a 63 y/o female admitted for hypertensive urgency; pt presented to the ED with increased BP and a headache  Pt recently hospitalized in February for unsteady gait and dizziness; pt noted with a carotid aneurysm  Pt noted with a PMH including anemia, DM, dyspnea on exertion, HTN, low vision (pt reports she is legally blind), seizures, and aneurysms  Pt XR Chest negative for acute cardiopulmonary disease  PTA pt reports independence for ADLs, functional transfers, and functional mobility with SPC as needed  Pt is -; -falls; -alone  Pt reports she is receiving home PT 2 x/wk  Upon initial eval, pt demonstrated slight deficits in funcitonal mobility, functional balance, and functional activity tolerance (currently Fair=15-20 minutes)  Pt was able to demonstrate good ADL status and stated no concerns about going home  Pt may benefit from a restorative program on the unit to improve overall endurance and balance  Acute OT tx not recommended at this time 2* limited ADL deficits      Goals   Patient Goals To go home   Plan   OT Frequency Eval only   Recommendation   OT Discharge Recommendation Home with family support   OT - OK to Discharge Yes   B[place]hel Index   Feeding 10   Bathing 5   Grooming Score 5   Dressing Score 10   Bladder Score 10   Bowels Score 10   Toilet Use Score 10   Transfers (Bed/Chair) Score 15   Mobility (Level Surface) Score 15   Stairs Score 0   Barthel Index Score 90     Nuzhat Bruce

## 2020-03-06 NOTE — INCIDENTAL FINDINGS
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 segment of left ICA and inferolaterally projecting 4 mm aneurysm arising from the junction of cavernous and supraclinoid left ICA are stable from CTA 2019      5  Unremarkable CT angiogram of the neck      6   Left thyroid lobe 1 8 cm nodule  According to guidelines published in the 2015 white paper on incidental thyroid nodules in the Journal of the [employer] of Radiology [facility]), Because the nodule is greater than 1 5 cm in size, further   characterization with thyroid ultrasound is recommended if there is no recent thyroid workup      Follow up required: Yes, US of the thyroid and Repeat Head CTA   Follow up should be done within 1-2 week(s)    Please notify the following clinician to assist with the follow up:   PCP

## 2020-03-09 ENCOUNTER — TRANSITIONAL CARE MANAGEMENT (OUTPATIENT)
Dept: FAMILY MEDICINE CLINIC | Facility: CLINIC | Age: 60
End: 2020-03-09

## 2020-03-09 DIAGNOSIS — E11.42 DIABETIC POLYNEUROPATHY ASSOCIATED WITH TYPE 2 DIABETES MELLITUS (HCC): ICD-10-CM

## 2020-03-09 DIAGNOSIS — I10 ESSENTIAL HYPERTENSION: Primary | ICD-10-CM

## 2020-03-09 DIAGNOSIS — M35.3 POLYMYALGIA RHEUMATICA (HCC): ICD-10-CM

## 2020-03-09 LAB
ATRIAL RATE: 70 BPM
P AXIS: 59 DEGREES
PR INTERVAL: 208 MS
QRS AXIS: 11 DEGREES
QRSD INTERVAL: 118 MS
QT INTERVAL: 442 MS
QTC INTERVAL: 477 MS
T WAVE AXIS: 33 DEGREES
VENTRICULAR RATE: 70 BPM

## 2020-03-09 PROCEDURE — 93010 ELECTROCARDIOGRAM REPORT: CPT | Performed by: INTERNAL MEDICINE

## 2020-03-09 RX ORDER — PREDNISONE 1 MG/1
TABLET ORAL
Qty: 120 TABLET | Refills: 2 | Status: CANCELLED | OUTPATIENT
Start: 2020-03-09

## 2020-03-09 NOTE — TELEPHONE ENCOUNTER
I refilled the prednisone last time but it and the gabapentin should be coming from her rheumatogist

## 2020-03-11 ENCOUNTER — TELEPHONE (OUTPATIENT)
Dept: OBGYN CLINIC | Facility: CLINIC | Age: 60
End: 2020-03-11

## 2020-03-11 RX ORDER — HYDROCHLOROTHIAZIDE 25 MG/1
25 TABLET ORAL DAILY
Qty: 90 TABLET | Refills: 1 | Status: SHIPPED | OUTPATIENT
Start: 2020-03-11 | End: 2020-04-09

## 2020-03-11 RX ORDER — DILTIAZEM HYDROCHLORIDE 240 MG/1
240 CAPSULE, COATED, EXTENDED RELEASE ORAL DAILY
Qty: 60 CAPSULE | Refills: 1 | Status: SHIPPED | OUTPATIENT
Start: 2020-03-11 | End: 2020-03-16 | Stop reason: SDUPTHER

## 2020-03-11 RX ORDER — GABAPENTIN 100 MG/1
CAPSULE ORAL
Qty: 90 CAPSULE | Refills: 3 | Status: SHIPPED | OUTPATIENT
Start: 2020-03-11 | End: 2020-06-09 | Stop reason: SDUPTHER

## 2020-03-12 ENCOUNTER — TELEPHONE (OUTPATIENT)
Dept: FAMILY MEDICINE CLINIC | Facility: CLINIC | Age: 60
End: 2020-03-12

## 2020-03-13 ENCOUNTER — TELEPHONE (OUTPATIENT)
Dept: FAMILY MEDICINE CLINIC | Facility: CLINIC | Age: 60
End: 2020-03-13

## 2020-03-13 NOTE — TELEPHONE ENCOUNTER
SIGNATURES NEEDED FOR [place] FORM RECEIVED VIA FAX  WILL BE PLACED IN YOUR BIN AT ASSIGNED DELIVERY TIMES      Office Treatment notes

## 2020-03-16 ENCOUNTER — OFFICE VISIT (OUTPATIENT)
Dept: FAMILY MEDICINE CLINIC | Facility: CLINIC | Age: 60
End: 2020-03-16

## 2020-03-16 VITALS
HEIGHT: 68 IN | WEIGHT: 293 LBS | HEART RATE: 90 BPM | TEMPERATURE: 97.3 F | OXYGEN SATURATION: 98 % | SYSTOLIC BLOOD PRESSURE: 140 MMHG | DIASTOLIC BLOOD PRESSURE: 78 MMHG | RESPIRATION RATE: 18 BRPM | BODY MASS INDEX: 44.41 KG/M2

## 2020-03-16 DIAGNOSIS — M35.3 PMR (POLYMYALGIA RHEUMATICA) (HCC): ICD-10-CM

## 2020-03-16 DIAGNOSIS — G44.029 CHRONIC CLUSTER HEADACHE, NOT INTRACTABLE: ICD-10-CM

## 2020-03-16 DIAGNOSIS — M35.3 POLYMYALGIA RHEUMATICA (HCC): ICD-10-CM

## 2020-03-16 DIAGNOSIS — I10 ESSENTIAL HYPERTENSION: ICD-10-CM

## 2020-03-16 DIAGNOSIS — I16.0 HYPERTENSIVE URGENCY: ICD-10-CM

## 2020-03-16 DIAGNOSIS — E04.1 THYROID NODULE: Primary | ICD-10-CM

## 2020-03-16 PROCEDURE — 99214 OFFICE O/P EST MOD 30 MIN: CPT | Performed by: PHYSICIAN ASSISTANT

## 2020-03-16 RX ORDER — DILTIAZEM HYDROCHLORIDE 240 MG/1
480 CAPSULE, COATED, EXTENDED RELEASE ORAL DAILY
Qty: 180 CAPSULE | Refills: 1 | Status: SHIPPED | OUTPATIENT
Start: 2020-03-16 | End: 2020-12-11 | Stop reason: SDUPTHER

## 2020-03-16 NOTE — PROGRESS NOTES
Assessment/Plan:    Hypertensive urgency    Blood pressure is well controlled  Continue on current medications  Headaches, cluster  Continue with riboflavin and magnesium  She is planning to call neurologist if there is a sooner appointment  No follow-ups on file  There are no Patient Instructions on file for this visit  Problem List Items Addressed This Visit        Endocrine    Thyroid nodule - Primary    Relevant Orders    US thyroid    Ambulatory referral to Neurology       Cardiovascular and Mediastinum    Hypertensive urgency       Blood pressure is well controlled  Continue on current medications  Relevant Medications    diltiazem (CARDIZEM CD) 240 mg 24 hr capsule       Nervous and Auditory    Headaches, cluster     Continue with riboflavin and magnesium  She is planning to call neurologist if there is a sooner appointment  Relevant Medications    diltiazem (CARDIZEM CD) 240 mg 24 hr capsule    Other Relevant Orders    Ambulatory referral to Neurology       Other    PMR (polymyalgia rheumatica) ([facility] [address] )    Relevant Orders    Ambulatory referral to Neurology      Other Visit Diagnoses     Polymyalgia rheumatica ([facility] [address] )        Relevant Orders    Ambulatory referral to Neurology    Essential hypertension        Relevant Medications    diltiazem (CARDIZEM CD) 240 mg 24 hr capsule            Subjective:     Tony Rojas is a 62 y o  female who  has a past medical history of Anemia, Arthritis, Diabetes mellitus ([address] ), Dyspnea on exertion, Hyperlipidemia, Hypertension, Legally blind, Mild intermittent asthma with exacerbation, Miscarriage, Seizures ([address] ), Sickle cell trait ([address] ), Sleep apnea, and Thyroid nodule   She also has no past medical history of Ankylosing spondylitis of site in spine [facility]), Arthritis of back, Arthritis of neck, Arthropathy of wrist, Bursitis of hip, Cancer ([address] ), CHF (congestive heart failure) ([facility] [address] ), Chronic kidney disease, COPD (chronic obstructive pulmonary disease) ([address] ), Coronary artery disease, De Quervain's disease (tenosynovitis), Fibromyalgia, primary, Frozen shoulder, Gout, Hand swelling, Hip arthrosis, History of transfusion, HIV disease ([address] ), Inflammation of arteries ([address] ), Knee swelling, Low back pain, Lupus ([address] ), Lyme disease, Osteoporosis, Periarthritis of shoulder, Plantar fasciitis, Polymyalgia rheumatica ([address] ), Polymyositis ([address] ), Pressure injury of skin, Pseudogout, Psoriatic arthritis ([address] ), Psychiatric disorder, Reflex sympathetic dystrophy, Renal disorder, Rheumatoid arthritis ([address] ), Rotator cuff syndrome, Sciatica, Sjogren's syndrome ([address] ), Spinal stenosis, Stroke ([address] ), Substance abuse ([address] ), Tendinitis, TIA (transient ischemic attack), or Trigger finger  who presented to the office today for follow up from hospital    On 3/5-3/6 for chest pain and headache  She had mildly elevated cardiac enzymes  Cardiac enzymes were and found to be within limits  She did have cardiac catheterization   tShe was admitted and head CT and neck CT were found to have  stable aneurysm without any significant changes  And she was also found to a thyroid nodule  She was started on Riboflavin  and magnesium  She did forget the B2 but now is taking it  She is still continuing to get headaches  She feels like she is tired now but she does not feel like she rests well when at home  She also has PMR  Since her prednisolone has been reduced she is having more joint pain  She is limited in using her hands more now and can hardly lift with right  She is relying more on left hand  She does use assistance devices for bathing and cleaning  She is having problems with her vision  She was told she has swellign in right eye  Both eyes are straining         BS was about 211 this AM but she did have some crackers this AM   It is typically lower    The following portions of the patient's history were reviewed and updated as appropriate:   She  has a past medical history of Anemia, Arthritis, Diabetes mellitus ([facility] [address] ), Dyspnea on exertion, Hyperlipidemia, Hypertension, Legally blind (2010), Mild intermittent asthma with exacerbation (8/4/2018), Miscarriage, Seizures ([facility] [address] ), Sickle cell trait ([facility] [address] ), Sleep apnea, and Thyroid nodule (3/6/2020)    She   Patient Active Problem List    Diagnosis Date Noted    Headaches, cluster 03/06/2020    Thyroid nodule 03/06/2020    Aneurysm ([facility] [address] ) 03/05/2020    Type 2 diabetes mellitus with hyperglycemia, with long-term current use of insulin ([facility] ) 02/21/2020    Left cavernous carotid aneurysm 02/10/2020    Polyneuropathy associated with underlying disease ([facility] ) 01/07/2020    PMR (polymyalgia rheumatica) ([facility] [address] ) 01/07/2020    Thrombocytosis ([facility] ) 01/07/2020    Morbid obesity ([facility] ) 09/19/2019    Other inflammatory and immune myopathies, not elsewhere classified 09/16/2019    Transaminitis 09/16/2019    Frequent headaches 07/09/2019    Type 2 diabetes mellitus with microalbuminuria, with long-term current use of insulin ([facility] ) 02/01/2019    Mild intermittent asthma with exacerbation 08/04/2018    Orthostatic hypotension 06/25/2018    Lung nodules 03/22/2018    Exertional dyspnea 02/13/2018    Enlarged pulmonary artery ([facility] ) 02/13/2018    Aortic dilatation ([facility] ) 02/13/2018    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, with long-term current use of insulin ([facility] )     Hypertensive urgency     Seizures ([facility] [address] )     Mild obstructive sleep apnea 12/18/2017    Prolonged QT interval 12/18/2017    Decreased pulses in feet 12/11/2017    Diabetes mellitus type 2 with complications, uncontrolled ([facility] [address] ) 09/08/2015    Microalbuminuria 09/08/2015    Vitamin D deficiency 06/06/2014    Visual impairment in both eyes 12/06/2012    Anemia 03/20/2012    Hyperlipidemia 03/20/2012    Class 3 severe obesity with serious comorbidity and body mass index (BMI) of 45 0 to 49 9 in adult [facility]) 03/20/2012    Peripheral neuropathy 03/20/2012     She  has a past surgical history that includes Cataract extraction (Bilateral); Eye surgery; Hysterectomy; Oophorectomy (Left); and pr temporal artery ligatn or bx (Bilateral, 10/17/2019)  Her family history includes Diabetes in her other; Heart attack in her maternal grandmother, mother, and other; Heart disease in her maternal grandmother, mother, and sister; Hypertension in her mother; No Known Problems in her brother, daughter, father, paternal aunt, sister, sister, son, and son  She  reports that she has never smoked  She has never used smokeless tobacco  She reports that she does not drink alcohol or use drugs  Current Outpatient Medications   Medication Sig Dispense Refill    albuterol (PROVENTIL HFA) 90 mcg/act inhaler Inhale 2 puffs every 6 (six) hours as needed for wheezing For another 24 hours use every 4 hours standing 6 7 g 0    aspirin (ADULT ASPIRIN EC LOW STRENGTH) 81 mg EC tablet Take 81 mg by mouth daily       atorvastatin (LIPITOR) 40 mg tablet Take 1 tablet (40 mg total) by mouth daily 90 tablet 1    BASAGLAR KWIKPEN 100 units/mL injection pen Inject 60 Units under the skin 2 (two) times a day      [employer] MICROLET LANCETS lancets Inject 1 applicator into the skin 4 (four) times a day      Blood Glucose Monitoring Suppl (CONTOUR NEXT MONITOR) w/Device KIT Disp 1 meter dx E11 9, may submitted any contour next meter  If not covered let us know we can change strips 1 kit 1    Blood Pressure Monitor KIT Check blood pressures BID 1 each 0    budesonide-formoterol (SYMBICORT) 80-4 5 MCG/ACT inhaler Inhale 2 puffs 2 (two) times a day Rinse mouth after use   (Patient taking differently: Inhale 2 puffs as needed Rinse mouth after use ) 1 Inhaler 5    Cholecalciferol (VITAMIN D3) 3000 units TABS Take 3,000 Units by mouth daily       CONTOUR NEXT TEST test strip Test blood sugar 3x per day dx E11 9 300 each 1    diltiazem (CARDIZEM CD) 240 mg 24 hr capsule Take 2 capsules (480 mg total) by mouth daily 180 capsule 1    ferrous sulfate 324 (65 Fe) mg Take 1 tablet (324 mg total) by mouth every other day 45 tablet 3    gabapentin (NEURONTIN) 100 mg capsule Take 1 capsule in am and 2-3 capsules at night 90 capsule 3    glucose blood test strip Contour Next Test Strips      HUMALOG KWIKPEN 100 units/mL injection pen Inject 35 Units under the skin 3 (three) times a day with meals also take 1 unit for 25 mg/dl above 150      hydrochlorothiazide (HYDRODIURIL) 25 mg tablet Take 25 mg by mouth daily       hydrochlorothiazide (HYDRODIURIL) 25 mg tablet Take 1 tablet (25 mg total) by mouth daily 90 tablet 1    Insulin Pen Needle (BD PEN NEEDLE DERRICK U/F) 32G X 4 MM MISC Inject 1 applicator under the skin 4 (four) times a day      losartan (COZAAR) 100 MG tablet TAKE 1 TABLET BY MOUTH EVERY DAY 30 tablet 5    magnesium oxide (MAG-OX) 400 mg Take 1 tablet (400 mg total) by mouth daily 30 tablet 0    predniSONE 1 mg tablet Combine to take 9 mg daily  120 tablet 2    predniSONE 5 mg tablet Combine to take 9 mg daily  30 tablet 2    Riboflavin 400 MG TABS Take 1 tablet (400 mg total) by mouth daily 30 tablet 0     No current facility-administered medications for this visit        Current Outpatient Medications on File Prior to Visit   Medication Sig    albuterol (PROVENTIL HFA) 90 mcg/act inhaler Inhale 2 puffs every 6 (six) hours as needed for wheezing For another 24 hours use every 4 hours standing    aspirin (ADULT ASPIRIN EC LOW STRENGTH) 81 mg EC tablet Take 81 mg by mouth daily     atorvastatin (LIPITOR) 40 mg tablet Take 1 tablet (40 mg total) by mouth daily    BASAGLAR KWIKPEN 100 units/mL injection pen Inject 60 Units under the skin 2 (two) times a day    [employer]AH MICROLET LANCETS lancets Inject 1 applicator into the skin 4 (four) times a day    Blood Glucose Monitoring Suppl (CONTOUR NEXT MONITOR) w/Device KIT Disp 1 meter dx E11 9, may submitted any contour next meter  If not covered let us know we can change strips    Blood Pressure Monitor KIT Check blood pressures BID    budesonide-formoterol (SYMBICORT) 80-4 5 MCG/ACT inhaler Inhale 2 puffs 2 (two) times a day Rinse mouth after use  (Patient taking differently: Inhale 2 puffs as needed Rinse mouth after use )    Cholecalciferol (VITAMIN D3) 3000 units TABS Take 3,000 Units by mouth daily     CONTOUR NEXT TEST test strip Test blood sugar 3x per day dx E11 9    ferrous sulfate 324 (65 Fe) mg Take 1 tablet (324 mg total) by mouth every other day    gabapentin (NEURONTIN) 100 mg capsule Take 1 capsule in am and 2-3 capsules at night    glucose blood test strip Contour Next Test Strips    HUMALOG KWIKPEN 100 units/mL injection pen Inject 35 Units under the skin 3 (three) times a day with meals also take 1 unit for 25 mg/dl above 150    hydrochlorothiazide (HYDRODIURIL) 25 mg tablet Take 25 mg by mouth daily     hydrochlorothiazide (HYDRODIURIL) 25 mg tablet Take 1 tablet (25 mg total) by mouth daily    Insulin Pen Needle (BD PEN NEEDLE DERRICK U/F) 32G X 4 MM MISC Inject 1 applicator under the skin 4 (four) times a day    losartan (COZAAR) 100 MG tablet TAKE 1 TABLET BY MOUTH EVERY DAY    magnesium oxide (MAG-OX) 400 mg Take 1 tablet (400 mg total) by mouth daily    predniSONE 1 mg tablet Combine to take 9 mg daily   predniSONE 5 mg tablet Combine to take 9 mg daily   Riboflavin 400 MG TABS Take 1 tablet (400 mg total) by mouth daily    [DISCONTINUED] diltiazem (CARDIZEM CD) 240 mg 24 hr capsule Take 1 capsule (240 mg total) by mouth daily     No current facility-administered medications on file prior to visit  She has No Known Allergies       Current Outpatient Medications on File Prior to Visit   Medication Sig Dispense Refill    albuterol (PROVENTIL HFA) 90 mcg/act inhaler Inhale 2 puffs every 6 (six) hours as needed for wheezing For another 24 hours use every 4 hours standing 6 7 g 0    aspirin (ADULT ASPIRIN EC LOW STRENGTH) 81 mg EC tablet Take 81 mg by mouth daily       atorvastatin (LIPITOR) 40 mg tablet Take 1 tablet (40 mg total) by mouth daily 90 tablet 1    BASAGLAR KWIKPEN 100 units/mL injection pen Inject 60 Units under the skin 2 (two) times a day      SARAH MICROLET LANCETS lancets Inject 1 applicator into the skin 4 (four) times a day      Blood Glucose Monitoring Suppl (CONTOUR NEXT MONITOR) w/Device KIT Disp 1 meter dx E11 9, may submitted any contour next meter  If not covered let us know we can change strips 1 kit 1    Blood Pressure Monitor KIT Check blood pressures BID 1 each 0    budesonide-formoterol (SYMBICORT) 80-4 5 MCG/ACT inhaler Inhale 2 puffs 2 (two) times a day Rinse mouth after use  (Patient taking differently: Inhale 2 puffs as needed Rinse mouth after use ) 1 Inhaler 5    Cholecalciferol (VITAMIN D3) 3000 units TABS Take 3,000 Units by mouth daily       CONTOUR NEXT TEST test strip Test blood sugar 3x per day dx E11 9 300 each 1    ferrous sulfate 324 (65 Fe) mg Take 1 tablet (324 mg total) by mouth every other day 45 tablet 3    gabapentin (NEURONTIN) 100 mg capsule Take 1 capsule in am and 2-3 capsules at night 90 capsule 3    glucose blood test strip Contour Next Test Strips      HUMALOG KWIKPEN 100 units/mL injection pen Inject 35 Units under the skin 3 (three) times a day with meals also take 1 unit for 25 mg/dl above 150      hydrochlorothiazide (HYDRODIURIL) 25 mg tablet Take 25 mg by mouth daily       hydrochlorothiazide (HYDRODIURIL) 25 mg tablet Take 1 tablet (25 mg total) by mouth daily 90 tablet 1    Insulin Pen Needle (BD PEN NEEDLE DERRICK U/F) 32G X 4 MM MISC Inject 1 applicator under the skin 4 (four) times a day      losartan (COZAAR) 100 MG tablet TAKE 1 TABLET BY MOUTH EVERY DAY 30 tablet 5    magnesium oxide (MAG-OX) 400 mg Take 1 tablet (400 mg total) by mouth daily 30 tablet 0    predniSONE 1 mg tablet Combine to take 9 mg daily   106 [address] Byrd tablet 2    predniSONE 5 mg tablet Combine to take 9 mg daily  30 tablet 2    Riboflavin 400 MG TABS Take 1 tablet (400 mg total) by mouth daily 30 tablet 0    [DISCONTINUED] diltiazem (CARDIZEM CD) 240 mg 24 hr capsule Take 1 capsule (240 mg total) by mouth daily 60 capsule 1     No current facility-administered medications on file prior to visit  Review of Systems   Constitutional: Positive for fatigue  Negative for activity change, appetite change, chills and unexpected weight change  HENT: Negative for ear pain, hearing loss and sore throat  Eyes: Positive for visual disturbance  Respiratory: Positive for shortness of breath  Negative for cough and wheezing  Cardiovascular: Negative for chest pain  Gastrointestinal: Negative for abdominal pain, constipation, diarrhea and vomiting  Genitourinary: Negative for difficulty urinating and dysuria  Musculoskeletal: Positive for arthralgias and myalgias  Negative for back pain  Skin: Negative for rash  Neurological: Positive for dizziness and headaches  Psychiatric/Behavioral: Negative for behavioral problems  The patient is nervous/anxious  Objective:    /78 (BP Location: Left arm, Patient Position: Sitting, Cuff Size: Large)   Pulse 90   Temp (!) 97 3 °F (36 3 °C) (Temporal)   Resp 18   Ht 5' 8" (1 727 m)   Wt (!) 142 kg (314 lb)   SpO2 98%   Breastfeeding No   BMI 47 74 kg/m²     Physical Exam   Constitutional: She is oriented to person, place, and time  She appears well-developed and well-nourished  No distress  HENT:   Head: Normocephalic and atraumatic  Right Ear: External ear normal    Left Ear: External ear normal    Eyes: Conjunctivae are normal    Neck: Normal range of motion  Neck supple  No thyromegaly present  Cardiovascular: Normal rate, regular rhythm and normal heart sounds  No murmur heard  Pulmonary/Chest: Effort normal and breath sounds normal  No respiratory distress   She has no wheezes  Musculoskeletal: She exhibits edema (right wrist) and tenderness (wrists and shoulders)  Decreased ROM both shoulder and flexion of wrists     Lymphadenopathy:     She has no cervical adenopathy  Neurological: She is alert and oriented to person, place, and time  Psychiatric: She has a normal mood and affect  Her behavior is normal    Nursing note and vitals reviewed        Anita Doty PA-C  03/16/20  3:37 PM

## 2020-03-16 NOTE — ASSESSMENT & PLAN NOTE
Continue with riboflavin and magnesium  She is planning to call neurologist if there is a sooner appointment

## 2020-03-18 ENCOUNTER — OFFICE VISIT (OUTPATIENT)
Dept: NEUROSURGERY | Facility: CLINIC | Age: 60
End: 2020-03-18
Payer: COMMERCIAL

## 2020-03-18 VITALS
WEIGHT: 293 LBS | HEART RATE: 92 BPM | RESPIRATION RATE: 18 BRPM | HEIGHT: 68 IN | BODY MASS INDEX: 44.41 KG/M2 | DIASTOLIC BLOOD PRESSURE: 80 MMHG | SYSTOLIC BLOOD PRESSURE: 144 MMHG | TEMPERATURE: 98.6 F

## 2020-03-18 DIAGNOSIS — E78.5 HYPERLIPIDEMIA, UNSPECIFIED HYPERLIPIDEMIA TYPE: ICD-10-CM

## 2020-03-18 DIAGNOSIS — I10 ESSENTIAL HYPERTENSION: ICD-10-CM

## 2020-03-18 DIAGNOSIS — I67.1 LEFT CAVERNOUS CAROTID ANEURYSM: Primary | ICD-10-CM

## 2020-03-18 DIAGNOSIS — I10 POORLY-CONTROLLED HYPERTENSION: ICD-10-CM

## 2020-03-18 DIAGNOSIS — IMO0002 UNCONTROLLED TYPE 2 DIABETES MELLITUS WITH OPHTHALMIC COMPLICATION, WITH LONG-TERM CURRENT USE OF INSULIN: ICD-10-CM

## 2020-03-18 DIAGNOSIS — I67.1 CEREBRAL ANEURYSM WITHOUT RUPTURE: ICD-10-CM

## 2020-03-18 PROCEDURE — 3079F DIAST BP 80-89 MM HG: CPT | Performed by: PHYSICIAN ASSISTANT

## 2020-03-18 PROCEDURE — 1036F TOBACCO NON-USER: CPT | Performed by: PHYSICIAN ASSISTANT

## 2020-03-18 PROCEDURE — 3066F NEPHROPATHY DOC TX: CPT | Performed by: PHYSICIAN ASSISTANT

## 2020-03-18 PROCEDURE — 3008F BODY MASS INDEX DOCD: CPT | Performed by: PHYSICIAN ASSISTANT

## 2020-03-18 PROCEDURE — 99214 OFFICE O/P EST MOD 30 MIN: CPT | Performed by: PHYSICIAN ASSISTANT

## 2020-03-18 PROCEDURE — 3077F SYST BP >= 140 MM HG: CPT | Performed by: PHYSICIAN ASSISTANT

## 2020-03-18 PROCEDURE — 2026F EYE IMG VALID EVC RTNOPTHY: CPT | Performed by: PHYSICIAN ASSISTANT

## 2020-03-18 NOTE — PATIENT INSTRUCTIONS
Further follow-up with Neurosurgery in approximately 1 year, Dr Goel Shoulder [facility])    CTA head without contrast a week or so prior to follow-up visit  Continue to follow your primary care provider on a regular basis for hypertensive surveillance and management  Continue to follow with her primary care provider for dyslipidemia (cholesterol) surveillance and management  Return to the emergency department with the worst sudden onset headache of her life by 911  Consultation with [facility]'s headache neurology as discussed by Dr Malave Esters

## 2020-03-18 NOTE — LETTER
March 18, 2020     Anita Doty PA-C  [address] [place] [address] 305  [address] [employer]    Patient: Mario Marcano   YOB: 1960   Date of Visit: 3/18/2020       Dear Dr Elyse Garces: Thank you for referring Lashonda White to me for evaluation  Below are my notes for this consultation  If you have questions, please do not hesitate to call me  I look forward to following your patient along with you  Sincerely,        Karen Marquez MD        CC: Annelise Hurtado, DO Dakota Blas PA-C  3/18/2020  2:44 PM  Sign at close encounter  Patient ID: Mario Marcano is a 62 y o  female  Diagnoses and all orders for this visit:    Left cavernous carotid aneurysm  -     CTA head w wo contrast; Future    Cerebral aneurysm without rupture  -     Ambulatory referral to Neurosurgery  -     CTA head w wo contrast; Future    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, with long-term current use of insulin (HCC)    Hyperlipidemia, unspecified hyperlipidemia type    Essential hypertension    Poorly-controlled hypertension          Assessment/Plan:    Very pleasant 49-year-old female, referred by Neurology, Dr Lorenzo Tamez for follow-up CTA head review, cerebral aneurysm  She had complaint of headache and multiple other complaints, [address] ER visit with admission 9/16/19 through 9/23/19 as part of her evaluation she underwent a CTA of her head 9/20/19 incidental note was made at that time of a left internal carotid artery aneurysm  She notes no prior history of imaging of the head  She has had recent admission [facility] for hypertensive urgency, 3/5/20 through 3/6/20, CTA head again repeated at this visit the prior identified aneurysms remain unchanged  CTA head 9/20/19, and 3/6/20 were carefully reviewed in detail by Dr Natividad Montelongo, the prior identified left internal carotid artery aneurysm remains stable when compared to prior studies      Jose Salgado has no family history of aneurysmal disease  She does make note of a female sibling who passed away age 21 unknown cause, she reports autopsy was not performed  She has an additional 2 female siblings in 3 male sibling her parents are no longer alive  She has no history of tobacco use in her life  She does note she is occasionally exposed to secondhand smoke but not on a daily basis    She does have a history of hypertension for which she follows with primary care provider on a regular basis  She has a history of dyslipidemia and follows with her primary care provider for surveillance and treatment on a regular basis  She continues with headaches, and has follow-up planned with Dr Aaron Akhtar for ongoing care and management  At visit today she offers no other complaints denies gait or balance disturbance, motor sensory difficulties in the upper lower extremities, bowel or bladder incontinence  She does have very poorly controlled diabetes and has significant diabetic retinopathy as a consequence with significant visual restriction, she reports she has received multiple laser treatments (photocoagulation) for her proliferative diabetic retinopathy  On examination today she is awake, alert, oriented x3 there are no focal neurologic deficits identified motor exam of the upper lower extremities is 5 x 5 for power reflexes were intact  Gait balance was within normal limits  At this juncture further follow-up with Neurosurgery is planned in approximately 1 year with Dr Brian Esparza, CTA with contrast a week or so prior to follow-up visit with Neurosurgery  She does understand should she have the sudden onset of the worst headache of her life(thunderclap headache), she should proceed to the emergency department via 911 for further assessment      She understands gait follow-up with Neurology relative to her headache management, return appointment is scheduled sometime in August we will attempt to have that arranged for a sooner time may she continues to be quite problematic relative to her headaches  These findings, impressions and recommendations are reviewed in great detail with the patient, she expressed understanding and agreement, her questions were answered completely and to her satisfaction  Return in about 1 year (around 3/18/2021) for Review CTA head       Chief Complaint  Visit to review CTA head, only complaints are continued migraine headaches  HPI       The following portions of the patient's history were reviewed and updated as appropriate: allergies, current medications, past family history, past medical history, past social history and past surgical history  Review of Systems   Constitutional: Negative  HENT: Negative  Eyes: Positive for visual disturbance (associated with headaches)  Respiratory: Negative  Cardiovascular: Negative  Gastrointestinal: Negative  Endocrine: Negative  Genitourinary: Negative  Musculoskeletal: Negative  Skin: Negative  Allergic/Immunologic: Negative  Neurological: Positive for dizziness, weakness (b/l legs at times), numbness (and tingling on right hand) and headaches  Hematological: Bruises/bleeds easily (on aspirin)  Psychiatric/Behavioral: Positive for decreased concentration (forgteful)  Negative for confusion  All other systems reviewed and are negative  Objective:    Physical Exam   Constitutional: She is oriented to person, place, and time  She appears well-developed and well-nourished  HENT:   Head: Normocephalic and atraumatic  Eyes: Pupils are equal, round, and reactive to light  EOM are normal    Cardiovascular: Normal rate  Pulmonary/Chest: Effort normal and breath sounds normal    Neurological: She is alert and oriented to person, place, and time  Skin: Skin is warm and dry  Psychiatric: She has a normal mood and affect  Vitals reviewed        Neurologic Exam     Mental Status Oriented to person, place, and time  Cranial Nerves     CN III, IV, VI   Pupils are equal, round, and reactive to light  Extraocular motions are normal             CTA NECK AND BRAIN WITH AND WITHOUT CONTRAST   3/6/20     INDICATION: Dizziness, non-specific  history of aneursym, and headache with uncontrolled HTN     COMPARISON:   CTA head and neck 2/10/2020     TECHNIQUE:  Routine CT imaging of the Brain without contrast   Post contrast imaging was performed after administration of iodinated contrast through the neck and brain  Post contrast axial 0 625 mm images timed to opacify the arterial system        3D rendering was performed on an independent workstation  MIP reconstructions performed  Coronal reconstructions were performed of the noncontrast portion of the brain        Radiation dose length product (DLP) for this visit:  4367 mGy-cm   This examination, like all CT scans performed in the [facility], was performed utilizing techniques to minimize radiation dose exposure, including the use of iterative   reconstruction and automated exposure control         IV Contrast:  85 mL of iohexol (OMNIPAQUE)     IMAGE QUALITY:   Diagnostic     FINDINGS:  NONCONTRAST BRAIN  PARENCHYMA: No acute intracranial hemorrhage  No masslike lesion, mass effect effect or midline shift  No CT evidence for acute infarct  Cerebral atrophy  Decreased attenuation involving periventricular and subcortical white matter demonstrating an   appearance that is statistically most likely to represent mild microangiopathic change        VENTRICLES AND EXTRA-AXIAL SPACES:  Normal for the patient's age      VISUALIZED ORBITS AND PARANASAL SINUSES:  Unremarkable      CERVICAL VASCULATURE  AORTIC ARCH AND GREAT VESSELS: Aortic arch and great vessel origins are unremarkable      RIGHT VERTEBRAL ARTERY CERVICAL SEGMENT:The vessel origin is unremarkable   The vessel is patent throughout the neck without significant stenosis      LEFT VERTEBRAL ARTERY CERVICAL SEGMENT: The vessel origin is unremarkable  The vessel is patent throughout the neck without significant stenosis        RIGHT EXTRACRANIAL CAROTID SEGMENT:There is mild noncalcified plaque at the bifurcation  No hemodynamically significant stenosis by NASCET criteria ( less than 50%)        LEFT EXTRACRANIAL CAROTID SEGMENT:   There is mild noncalcified plaque at the bifurcation  No hemodynamically significant stenosis by NASCET criteria ( less than 50%)        NASCET criteria was used to determine the degree of internal carotid artery diameter stenosis        INTRACRANIAL VASCULATURE   INTERNAL CAROTID ARTERIES: Mild atherosclerotic disease of cavernous and supraclinoid segments of bilateral internal carotid arteries without critical stenosis  Normal ophthalmic artery origins        Inferolaterally projecting blisterlike 4 mm aneurysm arising from distal cavernous segment of left ICA (series 5 image 188)  Also inferior projecting 4 mm aneurysm arising from the junction of cavernous and supraclinoid left ICA (series 5 image 185)  These are grossly stable from CTA 9/20/2019      ANTERIOR CIRCULATION:  Absent/hypoplastic right A1  Normal left [id number]  Bilateral anterior cerebral arteries are unremarkable  Normal anterior communicating artery      MIDDLE CEREBRAL ARTERY CIRCULATION:  Bilateral M1 segments and major M2 branches are patent without significant stenosis      DISTAL VERTEBRAL ARTERIES:  Distal vertebral arteries are unremarkable  Normal right PICA origin  Left PICA origin is not well seen  with suggested common trunk AICA/PICA     BASILAR ARTERY:  Basilar artery is normal in caliber  Normal superior cerebellar arteries         POSTERIOR CEREBRAL ARTERIES: Bilateral posterior cerebral arteries are unremarkable without critical stenosis     Absent/hypoplastic posterior communicating arteries     DURAL VENOUS SINUSES:  Unremarkable         NON VASCULAR ANATOMY      BONY STRUCTURES: No acute or aggressive appearing osseous abnormality  SOFT TISSUES OF THE NECK:  Left thyroid lobe nodule measuring up to 1 8 cm  THORACIC INLET:  There is 3 mm nodule/fissural lymph node in the partially imaged right middle lobe (series 5 image 69), stable from 8/4/2018         IMPRESSION:     1  No acute intracranial CT abnormality      2   Cerebral atrophy and mild chronic white matter microangiopathy     3  Patent major vasculature of [place] without critical stenosis      4  Inferolateral projecting blisterlike 4 mm aneurysm arising from distal cavernous segment of left ICA and inferolaterally projecting 4 mm aneurysm arising from the junction of cavernous and supraclinoid left ICA are stable from CTA 9/20/2019      5  Unremarkable CT angiogram of the neck      6   Left thyroid lobe 1 8 cm nodule  According to guidelines published in the February 2015 white paper on incidental thyroid nodules in the Journal of the [employer] of Radiology [facility]), Because the nodule is greater than 1 5 cm in size, further   characterization with thyroid ultrasound is recommended if there is no recent thyroid workup

## 2020-03-18 NOTE — PROGRESS NOTES
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, with long-term current use of insulin (HCC)    Hyperlipidemia, unspecified hyperlipidemia type    Essential hypertension    Poorly-controlled hypertension          Assessment/Plan:    Very pleasant 25-year-old female, referred by Neurology, Dr Kyra Coto for follow-up CTA head review, cerebral aneurysm  She had complaint of headache and multiple other complaints, [address] ER visit with admission 9/16/19 through 9/23/19 as part of her evaluation she underwent a CTA of her head 9/20/19 incidental note was made at that time of a left internal carotid artery aneurysm  She notes no prior history of imaging of the head  She has had recent admission [facility] for hypertensive urgency, 3/5/20 through 3/6/20, CTA head again repeated at this visit the prior identified aneurysms remain unchanged  CTA head 9/20/19, and 3/6/20 were carefully reviewed in detail by Dr Linda Coates, the prior identified left internal carotid artery aneurysm remains stable when compared to prior studies  S has no family history of aneurysmal disease  She does make note of a female sibling who passed away age 21 unknown cause, she reports autopsy was not performed  She has an additional 2 female siblings in 3 male sibling her parents are no longer alive  She has no history of tobacco use in her life  She does note she is occasionally exposed to secondhand smoke but not on a daily basis    She does have a history of hypertension for which she follows with primary care provider on a regular basis      She has a history of dyslipidemia and follows with her primary care provider for surveillance and treatment on a regular basis  She continues with headaches, and has follow-up planned with Dr Jolie Dewey for ongoing care and management  At visit today she offers no other complaints denies gait or balance disturbance, motor sensory difficulties in the upper lower extremities, bowel or bladder incontinence  She does have very poorly controlled diabetes and has significant diabetic retinopathy as a consequence with significant visual restriction, she reports she has received multiple laser treatments (photocoagulation) for her proliferative diabetic retinopathy  On examination today she is awake, alert, oriented x3 there are no focal neurologic deficits identified motor exam of the upper lower extremities is 5 x 5 for power reflexes were intact  Gait balance was within normal limits  At this juncture further follow-up with Neurosurgery is planned in approximately 1 year with HEIDE Hearn with contrast a week or so prior to follow-up visit with Neurosurgery  She does understand should she have the sudden onset of the worst headache of her life(thunderclap headache), she should proceed to the emergency department via 911 for further assessment  She understands gait follow-up with Neurology relative to her headache management, return appointment is scheduled sometime in August we will attempt to have that arranged for a sooner time may she continues to be quite problematic relative to her headaches  These findings, impressions and recommendations are reviewed in great detail with the patient, she expressed understanding and agreement, her questions were answered completely and to her satisfaction  Return in about 1 year (around 3/18/2021) for Review CTA head       Chief Complaint  Visit to review CTA head, only complaints are continued migraine headaches      HPI       The following portions of the patient's history were reviewed and updated as appropriate: allergies, current medications, past family history, past medical history, past social history and past surgical history  Review of Systems   Constitutional: Negative  HENT: Negative  Eyes: Positive for visual disturbance (associated with headaches)  Respiratory: Negative  Cardiovascular: Negative  Gastrointestinal: Negative  Endocrine: Negative  Genitourinary: Negative  Musculoskeletal: Negative  Skin: Negative  Allergic/Immunologic: Negative  Neurological: Positive for dizziness, weakness (b/l legs at times), numbness (and tingling on right hand) and headaches  Hematological: Bruises/bleeds easily (on aspirin)  Psychiatric/Behavioral: Positive for decreased concentration (forgteful)  Negative for confusion  All other systems reviewed and are negative  Objective:    Physical Exam   Constitutional: She is oriented to person, place, and time  She appears well-developed and well-nourished  HENT:   Head: Normocephalic and atraumatic  Eyes: Pupils are equal, round, and reactive to light  EOM are normal    Cardiovascular: Normal rate  Pulmonary/Chest: Effort normal and breath sounds normal    Neurological: She is alert and oriented to person, place, and time  Skin: Skin is warm and dry  Psychiatric: She has a normal mood and affect  Vitals reviewed  Neurologic Exam     Mental Status   Oriented to person, place, and time  Cranial Nerves     CN III, IV, VI   Pupils are equal, round, and reactive to light  Extraocular motions are normal             CTA NECK AND BRAIN WITH AND WITHOUT CONTRAST   3/6/20     INDICATION: Dizziness, non-specific  history of aneursym, and headache with uncontrolled HTN     COMPARISON:   CTA head and neck 2/10/2020     TECHNIQUE:  Routine CT imaging of the Brain without contrast   Post contrast imaging was performed after administration of iodinated contrast through the neck and brain   Post contrast axial 0 625 mm images timed to opacify the arterial system        3D rendering was performed on an independent workstation  MIP reconstructions performed  Coronal reconstructions were performed of the noncontrast portion of the brain        Radiation dose length product (DLP) for this visit:  6355 mGy-cm   This examination, like all CT scans performed in the [facility], was performed utilizing techniques to minimize radiation dose exposure, including the use of iterative   reconstruction and automated exposure control         IV Contrast:  85 mL of iohexol (OMNIPAQUE)     IMAGE QUALITY:   Diagnostic     FINDINGS:  NONCONTRAST BRAIN  PARENCHYMA: No acute intracranial hemorrhage  No masslike lesion, mass effect effect or midline shift  No CT evidence for acute infarct  Cerebral atrophy  Decreased attenuation involving periventricular and subcortical white matter demonstrating an   appearance that is statistically most likely to represent mild microangiopathic change        VENTRICLES AND EXTRA-AXIAL SPACES:  Normal for the patient's age      VISUALIZED ORBITS AND PARANASAL SINUSES:  Unremarkable      CERVICAL VASCULATURE  AORTIC ARCH AND GREAT VESSELS: Aortic arch and great vessel origins are unremarkable      RIGHT VERTEBRAL ARTERY CERVICAL SEGMENT:The vessel origin is unremarkable  The vessel is patent throughout the neck without significant stenosis      LEFT VERTEBRAL ARTERY CERVICAL SEGMENT: The vessel origin is unremarkable  The vessel is patent throughout the neck without significant stenosis        RIGHT EXTRACRANIAL CAROTID SEGMENT:There is mild noncalcified plaque at the bifurcation  No hemodynamically significant stenosis by NASCET criteria ( less than 50%)        LEFT EXTRACRANIAL CAROTID SEGMENT:   There is mild noncalcified plaque at the bifurcation   No hemodynamically significant stenosis by NASCET criteria ( less than 50%)        NASCET criteria was used to determine the degree of internal carotid artery diameter stenosis        INTRACRANIAL VASCULATURE   INTERNAL CAROTID ARTERIES: Mild atherosclerotic disease of cavernous and supraclinoid segments of bilateral internal carotid arteries without critical stenosis  Normal ophthalmic artery origins        Inferolaterally projecting blisterlike 4 mm aneurysm arising from distal cavernous segment of left ICA (series 5 image 188)  Also inferior projecting 4 mm aneurysm arising from the junction of cavernous and supraclinoid left ICA (series 5 image 185)  These are grossly stable from CTA 9/20/2019      ANTERIOR CIRCULATION:  Absent/hypoplastic right A1  Normal left [id number]  Bilateral anterior cerebral arteries are unremarkable  Normal anterior communicating artery      MIDDLE CEREBRAL ARTERY CIRCULATION:  Bilateral M1 segments and major M2 branches are patent without significant stenosis      DISTAL VERTEBRAL ARTERIES:  Distal vertebral arteries are unremarkable  Normal right PICA origin  Left PICA origin is not well seen  with suggested common trunk AICA/PICA     BASILAR ARTERY:  Basilar artery is normal in caliber  Normal superior cerebellar arteries         POSTERIOR CEREBRAL ARTERIES: Bilateral posterior cerebral arteries are unremarkable without critical stenosis  Absent/hypoplastic posterior communicating arteries     DURAL VENOUS SINUSES:  Unremarkable         NON VASCULAR ANATOMY      BONY STRUCTURES: No acute or aggressive appearing osseous abnormality  SOFT TISSUES OF THE NECK:  Left thyroid lobe nodule measuring up to 1 8 cm  THORACIC INLET:  There is 3 mm nodule/fissural lymph node in the partially imaged right middle lobe (series 5 image 69), stable from 8/4/2018         IMPRESSION:     1  No acute intracranial CT abnormality      2   Cerebral atrophy and mild chronic white matter microangiopathy     3  Patent major vasculature of [place] without critical stenosis      4    Inferolateral projecting blisterlike 4 mm aneurysm arising from distal cavernous segment of left ICA and inferolaterally projecting 4 mm aneurysm arising from the junction of cavernous and supraclinoid left ICA are stable from CTA 9/20/2019      5  Unremarkable CT angiogram of the neck      6   Left thyroid lobe 1 8 cm nodule  According to guidelines published in the February 2015 white paper on incidental thyroid nodules in the Journal of the [employer] of Radiology Kamilla Galan), Because the nodule is greater than 1 5 cm in size, further   characterization with thyroid ultrasound is recommended if there is no recent thyroid workup

## 2020-03-24 DIAGNOSIS — G43.909 MIGRAINE HEADACHE: ICD-10-CM

## 2020-03-25 ENCOUNTER — TELEMEDICINE (OUTPATIENT)
Dept: DIABETES SERVICES | Facility: CLINIC | Age: 60
End: 2020-03-25
Payer: COMMERCIAL

## 2020-03-25 ENCOUNTER — OFFICE VISIT (OUTPATIENT)
Dept: NEUROLOGY | Facility: CLINIC | Age: 60
End: 2020-03-25
Payer: COMMERCIAL

## 2020-03-25 VITALS
HEART RATE: 87 BPM | WEIGHT: 293 LBS | HEIGHT: 68 IN | BODY MASS INDEX: 44.41 KG/M2 | DIASTOLIC BLOOD PRESSURE: 60 MMHG | SYSTOLIC BLOOD PRESSURE: 132 MMHG

## 2020-03-25 DIAGNOSIS — G47.33 OBSTRUCTIVE SLEEP APNEA: ICD-10-CM

## 2020-03-25 DIAGNOSIS — I67.1 CEREBRAL ANEURYSM WITHOUT RUPTURE: ICD-10-CM

## 2020-03-25 DIAGNOSIS — Z79.4 TYPE 2 DIABETES MELLITUS WITH MICROALBUMINURIA, WITH LONG-TERM CURRENT USE OF INSULIN (HCC): Primary | ICD-10-CM

## 2020-03-25 DIAGNOSIS — I67.1 LEFT CAVERNOUS CAROTID ANEURYSM: ICD-10-CM

## 2020-03-25 DIAGNOSIS — G43.709 CHRONIC MIGRAINE WITHOUT AURA WITHOUT STATUS MIGRAINOSUS, NOT INTRACTABLE: Primary | ICD-10-CM

## 2020-03-25 DIAGNOSIS — Z86.69 HISTORY OF ABSENCE SEIZURES: ICD-10-CM

## 2020-03-25 DIAGNOSIS — G47.10 HYPERSOMNIA: ICD-10-CM

## 2020-03-25 DIAGNOSIS — G43.009 MIGRAINE WITHOUT AURA AND WITHOUT STATUS MIGRAINOSUS, NOT INTRACTABLE: ICD-10-CM

## 2020-03-25 DIAGNOSIS — I72.9 ANEURYSM (HCC): ICD-10-CM

## 2020-03-25 DIAGNOSIS — R80.9 TYPE 2 DIABETES MELLITUS WITH MICROALBUMINURIA, WITH LONG-TERM CURRENT USE OF INSULIN (HCC): Primary | ICD-10-CM

## 2020-03-25 DIAGNOSIS — E11.29 TYPE 2 DIABETES MELLITUS WITH MICROALBUMINURIA, WITH LONG-TERM CURRENT USE OF INSULIN (HCC): Primary | ICD-10-CM

## 2020-03-25 PROBLEM — G44.009 HEADACHES, CLUSTER: Status: RESOLVED | Noted: 2020-03-06 | Resolved: 2020-03-25

## 2020-03-25 PROCEDURE — 99215 OFFICE O/P EST HI 40 MIN: CPT | Performed by: PHYSICIAN ASSISTANT

## 2020-03-25 PROCEDURE — 2026F EYE IMG VALID EVC RTNOPTHY: CPT | Performed by: PHYSICIAN ASSISTANT

## 2020-03-25 PROCEDURE — G2062 QUAL NONMD EST PT 11-20M: HCPCS | Performed by: DIETITIAN, REGISTERED

## 2020-03-25 PROCEDURE — 1036F TOBACCO NON-USER: CPT | Performed by: PHYSICIAN ASSISTANT

## 2020-03-25 PROCEDURE — 3066F NEPHROPATHY DOC TX: CPT | Performed by: PHYSICIAN ASSISTANT

## 2020-03-25 PROCEDURE — 3075F SYST BP GE 130 - 139MM HG: CPT | Performed by: PHYSICIAN ASSISTANT

## 2020-03-25 PROCEDURE — 3078F DIAST BP <80 MM HG: CPT | Performed by: PHYSICIAN ASSISTANT

## 2020-03-25 PROCEDURE — 97803 MED NUTRITION INDIV SUBSEQ: CPT | Performed by: DIETITIAN, REGISTERED

## 2020-03-25 RX ORDER — TOPIRAMATE 25 MG/1
TABLET ORAL
Qty: 120 TABLET | Refills: 2 | Status: SHIPPED | OUTPATIENT
Start: 2020-03-25 | End: 2020-04-22 | Stop reason: SDUPTHER

## 2020-03-25 NOTE — PROGRESS NOTES
Virtual Regular Visit    Problem List Items Addressed This Visit        Endocrine    Type 2 diabetes mellitus with microalbuminuria, with long-term current use of insulin ([facility] [address] ) - Primary               Reason for visit is E11 29, R80 9, Z79 4    Encounter provider Blondell Kayser    Provider located at [address] [id number] [facility],4Th Floor  [address] [address]      Recent Visits  No visits were found meeting these conditions  Showing recent visits within past 7 days and meeting all other requirements     Future Appointments  No visits were found meeting these conditions  Showing future appointments within next 150 days and meeting all other requirements        After connecting through [employer], the patient was identified by name and date of birth  Carissa Arellano was informed that this is a telemedicine visit and that the visit is being conducted through [employer] which may not be secure and therefore, might not be HIPAA-compliant  My office door was closed  No one else was in the room  She acknowledged consent and understanding of privacy and security of the video platform  The patient has agreed to participate and understands they can discontinue the visit at any time  Subjective  Carissa Arellano is a 62 y o  female with T2DM  See MNT note below        Past Medical History:   Diagnosis Date    Anemia     Arthritis     Diabetes mellitus ([facility] [address] )     Dyspnea on exertion     Hyperlipidemia     Hypertension     Legally blind 2010    Mild intermittent asthma with exacerbation 8/4/2018    Miscarriage     x 3    Seizures (HCC)     Sickle cell trait ([facility] [address] )     Sleep apnea     Thyroid nodule 3/6/2020       Past Surgical History:   Procedure Laterality Date    CATARACT EXTRACTION Bilateral     august and september    EYE SURGERY      HYSTERECTOMY      39    OOPHORECTOMY Left     39    ID TEMPORAL ARTERY LIGATN OR BX Bilateral 10/17/2019    Procedure: BIOPSY ARTERY TEMPORAL;  Surgeon: Jim Tineo DO;  [facility]: [facility];  Service: Vascular       Current Outpatient Medications   Medication Sig Dispense Refill    albuterol (PROVENTIL HFA) 90 mcg/act inhaler Inhale 2 puffs every 6 (six) hours as needed for wheezing For another 24 hours use every 4 hours standing 6 7 g 0    aspirin (ADULT ASPIRIN EC LOW STRENGTH) 81 mg EC tablet Take 81 mg by mouth daily       atorvastatin (LIPITOR) 40 mg tablet Take 1 tablet (40 mg total) by mouth daily 90 tablet 1    BASAGLAR KWIKPEN 100 units/mL injection pen Inject 60 Units under the skin 2 (two) times a day      [employer] MICROLET LANCETS lancets Inject 1 applicator into the skin 4 (four) times a day      Blood Glucose Monitoring Suppl (CONTOUR NEXT MONITOR) w/Device KIT Disp 1 meter dx E11 9, may submitted any contour next meter  If not covered let us know we can change strips 1 kit 1    Blood Pressure Monitor KIT Check blood pressures BID 1 each 0    budesonide-formoterol (SYMBICORT) 80-4 5 MCG/ACT inhaler Inhale 2 puffs 2 (two) times a day Rinse mouth after use   (Patient taking differently: Inhale 2 puffs as needed Rinse mouth after use ) 1 Inhaler 5    Cholecalciferol (VITAMIN D3) 3000 units TABS Take 3,000 Units by mouth daily       CONTOUR NEXT TEST test strip Test blood sugar 3x per day dx E11 9 300 each 1    diltiazem (CARDIZEM CD) 240 mg 24 hr capsule Take 2 capsules (480 mg total) by mouth daily 180 capsule 1    ferrous sulfate 324 (65 Fe) mg Take 1 tablet (324 mg total) by mouth every other day 45 tablet 3    gabapentin (NEURONTIN) 100 mg capsule Take 1 capsule in am and 2-3 capsules at night 90 capsule 3    glucose blood test strip Contour Next Test Strips      HUMALOG KWIKPEN 100 units/mL injection pen Inject 35 Units under the skin 3 (three) times a day with meals also take 1 unit for 25 mg/dl above 150      hydrochlorothiazide (HYDRODIURIL) 25 mg tablet Take 25 mg by mouth daily       hydrochlorothiazide (HYDRODIURIL) 25 mg tablet Take 1 tablet (25 mg total) by mouth daily 90 tablet 1    Insulin Pen Needle (BD PEN NEEDLE DERRICK U/F) 32G X 4 MM MISC Inject 1 applicator under the skin 4 (four) times a day      losartan (COZAAR) 100 MG tablet TAKE 1 TABLET BY MOUTH EVERY DAY 30 tablet 5    magnesium oxide (MAG-OX) 400 mg TAKE 1 TABLET BY MOUTH EVERY DAY 30 tablet 0    predniSONE 1 mg tablet Combine to take 9 mg daily  120 tablet 2    predniSONE 5 mg tablet Combine to take 9 mg daily  30 tablet 2    Riboflavin 400 MG TABS Take 1 tablet (400 mg total) by mouth daily 30 tablet 0     No current facility-administered medications for this visit  No Known Allergies    Review of Systems    Physical Exam     I spent 17 minutes with the patient during this visit  Medical Nutrition Therapy        Assessment    Visit Type: Follow-up visit  Chief complaint/Medical Diagnosis/reason for visit E11 29, R80 9, Z79 4 (T2DM with microalbuminuria, with long term insulin)     BREANNE Gruber was seen for a virtual MNT visit today  Patient was 30 minutes late joining her virtual visit  Provided a shortened visit per her request  Luis Gruber reports a weight gain of 17-20 pounds since February  She was hospitalized in the beginning of March for uncontrolled HTN  Luis Gruber reports BG finger sticks are now usually below 150 after running in the 300-500's  Unable to assess intake as Luis Gruber does not keep food records  She attributes the weight gain to increased consumption of cheese and 2% milk  Patient agreed to keep daily food logs and follow-up in one week  RD will remain available for further dietary questions/cocnerns  Ht Readings from Last 1 Encounters:   03/18/20 5' 8" (1 727 m)     Wt Readings from Last 3 Encounters:   03/18/20 (!) 142 kg (314 lb)   03/16/20 (!) 142 kg (314 lb)   03/06/20 136 kg (299 lb 13 2 oz)     Weight Change: Yes 17-20 pound weight gain since February      Intervention: food diary     Treatment Goals: Patient will monitor food intake daily with tracker    Monitoring and evaluation:    Term code indicator  FH 1 3 2 Food Intake Criteria: Keep daily food logs and follow-up in one week  Patients Response to Instruction:  Alessio Oconnor  Expected Compliancegood    Start- Stop: 11:30-11:47  Total Minutes: 16 Minutes  Group or Individual Instruction: MNT-I  Other: Dr Nir Milan     Thank you for coming to the [facility] for education today  Please feel free to call with any questions or concerns      Blondell Kayser  [address] [place] [id number]

## 2020-04-01 ENCOUNTER — TELEMEDICINE (OUTPATIENT)
Dept: RHEUMATOLOGY | Facility: CLINIC | Age: 60
End: 2020-04-01
Payer: COMMERCIAL

## 2020-04-01 DIAGNOSIS — E66.01 CLASS 3 SEVERE OBESITY WITHOUT SERIOUS COMORBIDITY WITH BODY MASS INDEX (BMI) OF 45.0 TO 49.9 IN ADULT, UNSPECIFIED OBESITY TYPE (HCC): ICD-10-CM

## 2020-04-01 DIAGNOSIS — M35.3 POLYMYALGIA RHEUMATICA (HCC): Primary | ICD-10-CM

## 2020-04-01 DIAGNOSIS — IMO0001 IDDM (INSULIN DEPENDENT DIABETES MELLITUS): ICD-10-CM

## 2020-04-01 DIAGNOSIS — R70.0 ELEVATED SED RATE: ICD-10-CM

## 2020-04-01 DIAGNOSIS — R79.82 ELEVATED C-REACTIVE PROTEIN (CRP): ICD-10-CM

## 2020-04-01 DIAGNOSIS — Z79.52 CURRENT CHRONIC USE OF SYSTEMIC STEROIDS: ICD-10-CM

## 2020-04-01 DIAGNOSIS — G89.29 CHRONIC NONINTRACTABLE HEADACHE, UNSPECIFIED HEADACHE TYPE: ICD-10-CM

## 2020-04-01 DIAGNOSIS — R51.9 CHRONIC NONINTRACTABLE HEADACHE, UNSPECIFIED HEADACHE TYPE: ICD-10-CM

## 2020-04-01 PROCEDURE — 99443 PR PHYS/QHP TELEPHONE EVALUATION 21-30 MIN: CPT | Performed by: INTERNAL MEDICINE

## 2020-04-03 DIAGNOSIS — Z79.4 TYPE 2 DIABETES MELLITUS WITH HYPERGLYCEMIA, WITH LONG-TERM CURRENT USE OF INSULIN (HCC): Primary | ICD-10-CM

## 2020-04-03 DIAGNOSIS — E11.65 TYPE 2 DIABETES MELLITUS WITH HYPERGLYCEMIA, WITH LONG-TERM CURRENT USE OF INSULIN (HCC): Primary | ICD-10-CM

## 2020-04-03 RX ORDER — INSULIN GLARGINE 100 [IU]/ML
INJECTION, SOLUTION SUBCUTANEOUS
Qty: 15 ML | Refills: 0 | Status: SHIPPED | OUTPATIENT
Start: 2020-04-03 | End: 2020-05-08 | Stop reason: SDUPTHER

## 2020-04-06 ENCOUNTER — TELEMEDICINE (OUTPATIENT)
Dept: SLEEP CENTER | Facility: CLINIC | Age: 60
End: 2020-04-06
Payer: COMMERCIAL

## 2020-04-06 DIAGNOSIS — G47.10 HYPERSOMNIA: ICD-10-CM

## 2020-04-06 DIAGNOSIS — G47.33 OBSTRUCTIVE SLEEP APNEA: Primary | ICD-10-CM

## 2020-04-06 PROBLEM — G43.709 CHRONIC MIGRAINE WITHOUT AURA WITHOUT STATUS MIGRAINOSUS, NOT INTRACTABLE: Status: ACTIVE | Noted: 2020-04-06

## 2020-04-06 PROBLEM — I67.1 CEREBRAL ANEURYSM WITHOUT RUPTURE: Status: ACTIVE | Noted: 2020-04-06

## 2020-04-06 PROBLEM — G43.009 MIGRAINE WITHOUT AURA AND WITHOUT STATUS MIGRAINOSUS, NOT INTRACTABLE: Status: ACTIVE | Noted: 2020-04-06

## 2020-04-06 PROCEDURE — 99215 OFFICE O/P EST HI 40 MIN: CPT | Performed by: PSYCHIATRY & NEUROLOGY

## 2020-04-07 ENCOUNTER — TELEPHONE (OUTPATIENT)
Dept: CARDIOLOGY CLINIC | Facility: CLINIC | Age: 60
End: 2020-04-07

## 2020-04-09 ENCOUNTER — TELEMEDICINE (OUTPATIENT)
Dept: CARDIOLOGY CLINIC | Facility: CLINIC | Age: 60
End: 2020-04-09
Payer: COMMERCIAL

## 2020-04-09 VITALS
DIASTOLIC BLOOD PRESSURE: 73 MMHG | HEIGHT: 68 IN | BODY MASS INDEX: 47.74 KG/M2 | SYSTOLIC BLOOD PRESSURE: 141 MMHG | HEART RATE: 83 BPM

## 2020-04-09 DIAGNOSIS — I10 BENIGN ESSENTIAL HTN: ICD-10-CM

## 2020-04-09 DIAGNOSIS — E78.5 DYSLIPIDEMIA: ICD-10-CM

## 2020-04-09 DIAGNOSIS — I71.2 THORACIC AORTIC ANEURYSM WITHOUT RUPTURE (HCC): Primary | ICD-10-CM

## 2020-04-09 PROCEDURE — G2012 BRIEF CHECK IN BY MD/QHP: HCPCS | Performed by: INTERNAL MEDICINE

## 2020-04-13 ENCOUNTER — HOSPITAL ENCOUNTER (OUTPATIENT)
Dept: ULTRASOUND IMAGING | Facility: HOSPITAL | Age: 60
Discharge: HOME | End: 2020-04-13
Payer: COMMERCIAL

## 2020-04-13 DIAGNOSIS — E04.1 THYROID NODULE: ICD-10-CM

## 2020-04-13 DIAGNOSIS — E04.1 THYROID NODULE: Primary | ICD-10-CM

## 2020-04-13 PROCEDURE — 76536 US EXAM OF HEAD AND NECK: CPT

## 2020-04-17 ENCOUNTER — TELEPHONE (OUTPATIENT)
Dept: HEMATOLOGY ONCOLOGY | Facility: CLINIC | Age: 60
End: 2020-04-17

## 2020-04-17 DIAGNOSIS — D75.839 THROMBOCYTOSIS: ICD-10-CM

## 2020-04-17 DIAGNOSIS — D72.829 LEUKOCYTOSIS, UNSPECIFIED TYPE: Primary | ICD-10-CM

## 2020-04-18 DIAGNOSIS — G43.909 MIGRAINE HEADACHE: ICD-10-CM

## 2020-04-20 ENCOUNTER — TELEMEDICINE (OUTPATIENT)
Dept: HEMATOLOGY ONCOLOGY | Facility: CLINIC | Age: 60
End: 2020-04-20
Payer: COMMERCIAL

## 2020-04-20 DIAGNOSIS — D50.8 IRON DEFICIENCY ANEMIA SECONDARY TO INADEQUATE DIETARY IRON INTAKE: Primary | ICD-10-CM

## 2020-04-20 PROCEDURE — 3066F NEPHROPATHY DOC TX: CPT | Performed by: INTERNAL MEDICINE

## 2020-04-20 PROCEDURE — 3078F DIAST BP <80 MM HG: CPT | Performed by: INTERNAL MEDICINE

## 2020-04-20 PROCEDURE — 1036F TOBACCO NON-USER: CPT | Performed by: INTERNAL MEDICINE

## 2020-04-20 PROCEDURE — 3077F SYST BP >= 140 MM HG: CPT | Performed by: INTERNAL MEDICINE

## 2020-04-20 PROCEDURE — 2026F EYE IMG VALID EVC RTNOPTHY: CPT | Performed by: INTERNAL MEDICINE

## 2020-04-20 PROCEDURE — 99213 OFFICE O/P EST LOW 20 MIN: CPT | Performed by: INTERNAL MEDICINE

## 2020-04-21 ENCOUNTER — HOSPITAL ENCOUNTER (OUTPATIENT)
Dept: NEUROLOGY | Facility: CLINIC | Age: 60
Discharge: HOME | End: 2020-04-21
Payer: COMMERCIAL

## 2020-04-21 ENCOUNTER — TELEPHONE (OUTPATIENT)
Dept: NEUROLOGY | Facility: CLINIC | Age: 60
End: 2020-04-21

## 2020-04-21 DIAGNOSIS — Z86.69 HISTORY OF ABSENCE SEIZURES: ICD-10-CM

## 2020-04-21 DIAGNOSIS — G47.10 HYPERSOMNIA: ICD-10-CM

## 2020-04-21 PROCEDURE — 95816 EEG AWAKE AND DROWSY: CPT | Performed by: PSYCHIATRY & NEUROLOGY

## 2020-04-21 PROCEDURE — 95816 EEG AWAKE AND DROWSY: CPT

## 2020-04-22 ENCOUNTER — TELEMEDICINE (OUTPATIENT)
Dept: NEUROLOGY | Facility: CLINIC | Age: 60
End: 2020-04-22
Payer: COMMERCIAL

## 2020-04-22 DIAGNOSIS — M35.3 POLYMYALGIA RHEUMATICA (HCC): ICD-10-CM

## 2020-04-22 DIAGNOSIS — G47.33 OBSTRUCTIVE SLEEP APNEA: ICD-10-CM

## 2020-04-22 DIAGNOSIS — M35.3 PMR (POLYMYALGIA RHEUMATICA) (HCC): ICD-10-CM

## 2020-04-22 DIAGNOSIS — I67.1 LEFT CAVERNOUS CAROTID ANEURYSM: ICD-10-CM

## 2020-04-22 DIAGNOSIS — I67.1 CEREBRAL ANEURYSM WITHOUT RUPTURE: ICD-10-CM

## 2020-04-22 DIAGNOSIS — I10 ESSENTIAL HYPERTENSION: ICD-10-CM

## 2020-04-22 DIAGNOSIS — E04.1 THYROID NODULE: ICD-10-CM

## 2020-04-22 DIAGNOSIS — G43.009 MIGRAINE WITHOUT AURA AND WITHOUT STATUS MIGRAINOSUS, NOT INTRACTABLE: ICD-10-CM

## 2020-04-22 DIAGNOSIS — G43.709 CHRONIC MIGRAINE WITHOUT AURA WITHOUT STATUS MIGRAINOSUS, NOT INTRACTABLE: Primary | ICD-10-CM

## 2020-04-22 DIAGNOSIS — Z86.69 HISTORY OF ABSENCE SEIZURES: ICD-10-CM

## 2020-04-22 PROCEDURE — 99214 OFFICE O/P EST MOD 30 MIN: CPT | Performed by: PHYSICIAN ASSISTANT

## 2020-04-22 RX ORDER — TOPIRAMATE 100 MG/1
TABLET, FILM COATED ORAL
Qty: 30 TABLET | Refills: 2 | Status: SHIPPED | OUTPATIENT
Start: 2020-04-22 | End: 2020-07-12

## 2020-05-04 ENCOUNTER — LAB (OUTPATIENT)
Dept: LAB | Facility: HOSPITAL | Age: 60
End: 2020-05-04
Attending: INTERNAL MEDICINE
Payer: COMMERCIAL

## 2020-05-04 ENCOUNTER — TELEPHONE (OUTPATIENT)
Dept: RHEUMATOLOGY | Facility: CLINIC | Age: 60
End: 2020-05-04

## 2020-05-04 DIAGNOSIS — Z79.4 TYPE 2 DIABETES MELLITUS WITH MICROALBUMINURIA, WITH LONG-TERM CURRENT USE OF INSULIN (HCC): ICD-10-CM

## 2020-05-04 DIAGNOSIS — D72.829 LEUKOCYTOSIS, UNSPECIFIED TYPE: ICD-10-CM

## 2020-05-04 DIAGNOSIS — E11.65 TYPE 2 DIABETES MELLITUS WITH HYPERGLYCEMIA, UNSPECIFIED WHETHER LONG TERM INSULIN USE (HCC): ICD-10-CM

## 2020-05-04 DIAGNOSIS — E11.29 TYPE 2 DIABETES MELLITUS WITH MICROALBUMINURIA, WITH LONG-TERM CURRENT USE OF INSULIN (HCC): ICD-10-CM

## 2020-05-04 DIAGNOSIS — R80.9 TYPE 2 DIABETES MELLITUS WITH MICROALBUMINURIA, WITH LONG-TERM CURRENT USE OF INSULIN (HCC): ICD-10-CM

## 2020-05-04 DIAGNOSIS — M35.3 POLYMYALGIA RHEUMATICA (HCC): ICD-10-CM

## 2020-05-04 DIAGNOSIS — E66.01 CLASS 3 SEVERE OBESITY DUE TO EXCESS CALORIES WITH SERIOUS COMORBIDITY AND BODY MASS INDEX (BMI) OF 45.0 TO 49.9 IN ADULT (HCC): ICD-10-CM

## 2020-05-04 DIAGNOSIS — E55.9 VITAMIN D DEFICIENCY: ICD-10-CM

## 2020-05-04 DIAGNOSIS — IMO0002 UNCONTROLLED TYPE 2 DIABETES MELLITUS WITH OPHTHALMIC COMPLICATION, WITH LONG-TERM CURRENT USE OF INSULIN: ICD-10-CM

## 2020-05-04 DIAGNOSIS — D75.839 THROMBOCYTOSIS: ICD-10-CM

## 2020-05-04 LAB
25(OH)D3 SERPL-MCNC: 24.4 NG/ML (ref 30–100)
ALBUMIN SERPL BCP-MCNC: 3.5 G/DL (ref 3–5.2)
ALP SERPL-CCNC: 161 U/L (ref 43–122)
ALT SERPL W P-5'-P-CCNC: 19 U/L (ref 9–52)
ANION GAP SERPL CALCULATED.3IONS-SCNC: 7 MMOL/L (ref 5–14)
AST SERPL W P-5'-P-CCNC: 16 U/L (ref 14–36)
BASOPHILS # BLD AUTO: 0.1 THOUSANDS/ΜL (ref 0–0.1)
BASOPHILS NFR BLD AUTO: 1 % (ref 0–1)
BILIRUB SERPL-MCNC: 0.7 MG/DL
BUN SERPL-MCNC: 19 MG/DL (ref 5–25)
CALCIUM SERPL-MCNC: 9.3 MG/DL (ref 8.4–10.2)
CHLORIDE SERPL-SCNC: 102 MMOL/L (ref 97–108)
CHOLEST SERPL-MCNC: 177 MG/DL
CO2 SERPL-SCNC: 29 MMOL/L (ref 22–30)
CREAT SERPL-MCNC: 1.04 MG/DL (ref 0.6–1.2)
CREAT UR-MCNC: 196 MG/DL
CRP SERPL QL: 22.6 MG/L
EOSINOPHIL # BLD AUTO: 0.3 THOUSAND/ΜL (ref 0–0.4)
EOSINOPHIL NFR BLD AUTO: 2 % (ref 0–6)
ERYTHROCYTE [DISTWIDTH] IN BLOOD BY AUTOMATED COUNT: 18.4 %
ERYTHROCYTE [SEDIMENTATION RATE] IN BLOOD: 70 MM/HOUR (ref 1–20)
EST. AVERAGE GLUCOSE BLD GHB EST-MCNC: 298 MG/DL
FERRITIN SERPL-MCNC: 120 NG/ML (ref 8–388)
GFR SERPL CREATININE-BSD FRML MDRD: 67 ML/MIN/1.73SQ M
GLUCOSE P FAST SERPL-MCNC: 270 MG/DL (ref 70–99)
HBA1C MFR BLD: 12 %
HCT VFR BLD AUTO: 33.6 % (ref 36–46)
HDLC SERPL-MCNC: 51 MG/DL
HGB BLD-MCNC: 10.9 G/DL (ref 12–16)
HYPERCHROMIA BLD QL SMEAR: PRESENT
IRON SATN MFR SERPL: 41 %
IRON SERPL-MCNC: 102 UG/DL (ref 50–170)
LDH SERPL-CCNC: 443 U/L (ref 313–618)
LDLC SERPL CALC-MCNC: 107 MG/DL
LYMPHOCYTES # BLD AUTO: 3.4 THOUSANDS/ΜL (ref 0.5–4)
LYMPHOCYTES NFR BLD AUTO: 28 % (ref 25–45)
MCH RBC QN AUTO: 25.1 PG (ref 26–34)
MCHC RBC AUTO-ENTMCNC: 32.3 G/DL (ref 31–36)
MCV RBC AUTO: 78 FL (ref 80–100)
MICROALBUMIN UR-MCNC: 274 MG/L (ref 0–20)
MICROALBUMIN/CREAT 24H UR: 140 MG/G CREATININE (ref 0–30)
MICROCYTES BLD QL AUTO: PRESENT
MONOCYTES # BLD AUTO: 0.7 THOUSAND/ΜL (ref 0.2–0.9)
MONOCYTES NFR BLD AUTO: 6 % (ref 1–10)
NEUTROPHILS # BLD AUTO: 7.7 THOUSANDS/ΜL (ref 1.8–7.8)
NEUTS SEG NFR BLD AUTO: 64 % (ref 45–65)
PLATELET # BLD AUTO: 296 THOUSANDS/UL (ref 150–450)
PLATELET BLD QL SMEAR: ADEQUATE
PMV BLD AUTO: 8.9 FL (ref 8.9–12.7)
POTASSIUM SERPL-SCNC: 3.5 MMOL/L (ref 3.6–5)
PROT SERPL-MCNC: 7.1 G/DL (ref 5.9–8.4)
RBC # BLD AUTO: 4.32 MILLION/UL (ref 4–5.2)
RBC MORPH BLD: NORMAL
SODIUM SERPL-SCNC: 138 MMOL/L (ref 137–147)
T4 FREE SERPL-MCNC: 0.96 NG/DL (ref 0.76–1.46)
TIBC SERPL-MCNC: 251 UG/DL (ref 250–450)
TRIGL SERPL-MCNC: 97 MG/DL
TSH SERPL DL<=0.05 MIU/L-ACNC: 1.82 UIU/ML (ref 0.47–4.68)
VIT B12 SERPL-MCNC: 1290 PG/ML (ref 100–900)
WBC # BLD AUTO: 12.1 THOUSAND/UL (ref 4.5–11)

## 2020-05-04 PROCEDURE — 82570 ASSAY OF URINE CREATININE: CPT

## 2020-05-04 PROCEDURE — 82306 VITAMIN D 25 HYDROXY: CPT

## 2020-05-04 PROCEDURE — 83615 LACTATE (LD) (LDH) ENZYME: CPT

## 2020-05-04 PROCEDURE — 80053 COMPREHEN METABOLIC PANEL: CPT

## 2020-05-04 PROCEDURE — 86140 C-REACTIVE PROTEIN: CPT

## 2020-05-04 PROCEDURE — 83036 HEMOGLOBIN GLYCOSYLATED A1C: CPT

## 2020-05-04 PROCEDURE — 80061 LIPID PANEL: CPT

## 2020-05-04 PROCEDURE — 82607 VITAMIN B-12: CPT

## 2020-05-04 PROCEDURE — 36415 COLL VENOUS BLD VENIPUNCTURE: CPT

## 2020-05-04 PROCEDURE — 82043 UR ALBUMIN QUANTITATIVE: CPT

## 2020-05-04 PROCEDURE — 82728 ASSAY OF FERRITIN: CPT

## 2020-05-04 PROCEDURE — 3060F POS MICROALBUMINURIA REV: CPT | Performed by: PHYSICIAN ASSISTANT

## 2020-05-04 PROCEDURE — 84439 ASSAY OF FREE THYROXINE: CPT

## 2020-05-04 PROCEDURE — 83540 ASSAY OF IRON: CPT

## 2020-05-04 PROCEDURE — 84443 ASSAY THYROID STIM HORMONE: CPT

## 2020-05-04 PROCEDURE — 85652 RBC SED RATE AUTOMATED: CPT

## 2020-05-04 PROCEDURE — 3046F HEMOGLOBIN A1C LEVEL >9.0%: CPT | Performed by: PHYSICIAN ASSISTANT

## 2020-05-04 PROCEDURE — 85025 COMPLETE CBC W/AUTO DIFF WBC: CPT

## 2020-05-04 PROCEDURE — 83550 IRON BINDING TEST: CPT

## 2020-05-05 ENCOUNTER — TELEPHONE (OUTPATIENT)
Dept: ENDOCRINOLOGY | Facility: CLINIC | Age: 60
End: 2020-05-05

## 2020-05-08 ENCOUNTER — TELEMEDICINE (OUTPATIENT)
Dept: FAMILY MEDICINE CLINIC | Facility: CLINIC | Age: 60
End: 2020-05-08

## 2020-05-08 DIAGNOSIS — E11.65 TYPE 2 DIABETES MELLITUS WITH HYPERGLYCEMIA, WITH LONG-TERM CURRENT USE OF INSULIN (HCC): ICD-10-CM

## 2020-05-08 DIAGNOSIS — Z79.4 TYPE 2 DIABETES MELLITUS WITH HYPERGLYCEMIA, WITH LONG-TERM CURRENT USE OF INSULIN (HCC): ICD-10-CM

## 2020-05-08 DIAGNOSIS — IMO0002 DIABETES MELLITUS TYPE 2 WITH COMPLICATIONS, UNCONTROLLED: Primary | ICD-10-CM

## 2020-05-08 PROCEDURE — 99442 PR PHYS/QHP TELEPHONE EVALUATION 11-20 MIN: CPT | Performed by: PHYSICIAN ASSISTANT

## 2020-05-08 RX ORDER — INSULIN LISPRO 100 [IU]/ML
35 INJECTION, SOLUTION INTRAVENOUS; SUBCUTANEOUS
Qty: 5 PEN | Refills: 1 | Status: SHIPPED | OUTPATIENT
Start: 2020-05-08 | End: 2020-06-24 | Stop reason: SDUPTHER

## 2020-05-13 ENCOUNTER — HOSPITAL ENCOUNTER (OUTPATIENT)
Dept: ULTRASOUND IMAGING | Facility: HOSPITAL | Age: 60
Discharge: HOME | End: 2020-05-13
Admitting: RADIOLOGY
Payer: COMMERCIAL

## 2020-05-13 DIAGNOSIS — E04.1 THYROID NODULE: ICD-10-CM

## 2020-05-13 PROCEDURE — 10005 FNA BX W/US GDN 1ST LES: CPT

## 2020-05-13 PROCEDURE — 88173 CYTOPATH EVAL FNA REPORT: CPT | Performed by: PATHOLOGY

## 2020-05-13 PROCEDURE — 88172 CYTP DX EVAL FNA 1ST EA SITE: CPT | Performed by: PATHOLOGY

## 2020-05-13 RX ORDER — LIDOCAINE HYDROCHLORIDE 10 MG/ML
5 INJECTION, SOLUTION EPIDURAL; INFILTRATION; INTRACAUDAL; PERINEURAL ONCE
Status: COMPLETED | OUTPATIENT
Start: 2020-05-13 | End: 2020-05-13

## 2020-05-13 RX ORDER — LIDOCAINE WITH 8.4% SOD BICARB 0.9%(10ML)
5 SYRINGE (ML) INJECTION ONCE
Status: DISCONTINUED | OUTPATIENT
Start: 2020-05-13 | End: 2020-05-13 | Stop reason: CLARIF

## 2020-05-13 RX ADMIN — LIDOCAINE HYDROCHLORIDE 5 ML: 10 INJECTION, SOLUTION EPIDURAL; INFILTRATION; INTRACAUDAL; PERINEURAL at 13:25

## 2020-05-15 ENCOUNTER — TELEPHONE (OUTPATIENT)
Dept: ENDOCRINOLOGY | Facility: CLINIC | Age: 60
End: 2020-05-15

## 2020-05-15 DIAGNOSIS — E11.29 TYPE 2 DIABETES MELLITUS WITH MICROALBUMINURIA, WITH LONG-TERM CURRENT USE OF INSULIN (HCC): Primary | ICD-10-CM

## 2020-05-15 DIAGNOSIS — Z79.4 TYPE 2 DIABETES MELLITUS WITH MICROALBUMINURIA, WITH LONG-TERM CURRENT USE OF INSULIN (HCC): Primary | ICD-10-CM

## 2020-05-15 DIAGNOSIS — R80.9 TYPE 2 DIABETES MELLITUS WITH MICROALBUMINURIA, WITH LONG-TERM CURRENT USE OF INSULIN (HCC): Primary | ICD-10-CM

## 2020-05-15 RX ORDER — BLOOD SUGAR DIAGNOSTIC
STRIP MISCELLANEOUS
Qty: 300 EACH | Refills: 3 | Status: SHIPPED | OUTPATIENT
Start: 2020-05-15 | End: 2020-08-27 | Stop reason: SDUPTHER

## 2020-05-21 ENCOUNTER — TELEMEDICINE (OUTPATIENT)
Dept: ENDOCRINOLOGY | Facility: CLINIC | Age: 60
End: 2020-05-21
Payer: COMMERCIAL

## 2020-05-21 DIAGNOSIS — E78.5 HYPERLIPIDEMIA, UNSPECIFIED HYPERLIPIDEMIA TYPE: ICD-10-CM

## 2020-05-21 DIAGNOSIS — IMO0002 UNCONTROLLED TYPE 2 DIABETES MELLITUS WITH OPHTHALMIC COMPLICATION, WITH LONG-TERM CURRENT USE OF INSULIN: Primary | ICD-10-CM

## 2020-05-21 DIAGNOSIS — R80.9 MICROALBUMINURIA: ICD-10-CM

## 2020-05-21 DIAGNOSIS — E04.1 THYROID NODULE: ICD-10-CM

## 2020-05-21 DIAGNOSIS — I10 BENIGN HYPERTENSION: ICD-10-CM

## 2020-05-21 DIAGNOSIS — E55.9 VITAMIN D DEFICIENCY: ICD-10-CM

## 2020-05-21 DIAGNOSIS — I10 ESSENTIAL HYPERTENSION: ICD-10-CM

## 2020-05-21 PROCEDURE — 99214 OFFICE O/P EST MOD 30 MIN: CPT | Performed by: PHYSICIAN ASSISTANT

## 2020-05-21 RX ORDER — METFORMIN HYDROCHLORIDE 500 MG/1
TABLET, EXTENDED RELEASE ORAL
Qty: 120 TABLET | Refills: 3 | Status: SHIPPED | OUTPATIENT
Start: 2020-05-21 | End: 2020-08-12 | Stop reason: SDUPTHER

## 2020-05-21 RX ORDER — LOSARTAN POTASSIUM 100 MG/1
100 TABLET ORAL DAILY
Qty: 30 TABLET | Refills: 5 | Status: SHIPPED | OUTPATIENT
Start: 2020-05-21 | End: 2020-08-12 | Stop reason: SDUPTHER

## 2020-05-24 DIAGNOSIS — G43.909 MIGRAINE HEADACHE: ICD-10-CM

## 2020-05-28 ENCOUNTER — HOSPITAL ENCOUNTER (OUTPATIENT)
Dept: SLEEP CENTER | Facility: CLINIC | Age: 60
Discharge: HOME | End: 2020-05-28
Payer: COMMERCIAL

## 2020-05-28 DIAGNOSIS — G47.33 OBSTRUCTIVE SLEEP APNEA: ICD-10-CM

## 2020-05-28 PROCEDURE — G0399 HOME SLEEP TEST/TYPE 3 PORTA: HCPCS

## 2020-06-03 ENCOUNTER — TELEMEDICINE (OUTPATIENT)
Dept: DIABETES SERVICES | Facility: CLINIC | Age: 60
End: 2020-06-03
Payer: COMMERCIAL

## 2020-06-03 ENCOUNTER — TELEPHONE (OUTPATIENT)
Dept: SLEEP CENTER | Facility: CLINIC | Age: 60
End: 2020-06-03

## 2020-06-03 DIAGNOSIS — E11.39 TYPE 2 DIABETES MELLITUS WITH OTHER OPHTHALMIC COMPLICATION, WITH LONG-TERM CURRENT USE OF INSULIN (HCC): Primary | ICD-10-CM

## 2020-06-03 DIAGNOSIS — Z79.4 TYPE 2 DIABETES MELLITUS WITH OTHER OPHTHALMIC COMPLICATION, WITH LONG-TERM CURRENT USE OF INSULIN (HCC): Primary | ICD-10-CM

## 2020-06-03 PROCEDURE — G0270 MNT SUBS TX FOR CHANGE DX: HCPCS | Performed by: DIETITIAN, REGISTERED

## 2020-06-05 DIAGNOSIS — G47.33 OBSTRUCTIVE SLEEP APNEA: Primary | ICD-10-CM

## 2020-06-08 DIAGNOSIS — G47.33 OBSTRUCTIVE SLEEP APNEA: Primary | ICD-10-CM

## 2020-06-09 DIAGNOSIS — E11.42 DIABETIC POLYNEUROPATHY ASSOCIATED WITH TYPE 2 DIABETES MELLITUS (HCC): ICD-10-CM

## 2020-06-09 RX ORDER — GABAPENTIN 100 MG/1
CAPSULE ORAL
Qty: 90 CAPSULE | Refills: 3 | Status: SHIPPED | OUTPATIENT
Start: 2020-06-09 | End: 2020-11-16 | Stop reason: SDUPTHER

## 2020-06-11 DIAGNOSIS — M35.3 POLYMYALGIA RHEUMATICA (HCC): ICD-10-CM

## 2020-06-11 DIAGNOSIS — M35.3 PMR (POLYMYALGIA RHEUMATICA) (HCC): ICD-10-CM

## 2020-06-12 RX ORDER — PREDNISONE 1 MG/1
TABLET ORAL
Qty: 120 TABLET | Refills: 2 | OUTPATIENT
Start: 2020-06-12

## 2020-06-14 DIAGNOSIS — G47.33 OBSTRUCTIVE SLEEP APNEA: Primary | ICD-10-CM

## 2020-06-18 RX ORDER — PREDNISONE 1 MG/1
TABLET ORAL
Qty: 60 TABLET | Refills: 0 | Status: SHIPPED | OUTPATIENT
Start: 2020-06-18 | End: 2020-07-09

## 2020-06-18 RX ORDER — PREDNISONE 1 MG/1
TABLET ORAL
Qty: 30 TABLET | Refills: 1 | Status: SHIPPED | OUTPATIENT
Start: 2020-06-18 | End: 2020-09-23

## 2020-06-24 DIAGNOSIS — E11.65 TYPE 2 DIABETES MELLITUS WITH HYPERGLYCEMIA, WITH LONG-TERM CURRENT USE OF INSULIN (HCC): ICD-10-CM

## 2020-06-24 DIAGNOSIS — Z79.4 TYPE 2 DIABETES MELLITUS WITH HYPERGLYCEMIA, WITH LONG-TERM CURRENT USE OF INSULIN (HCC): ICD-10-CM

## 2020-06-24 RX ORDER — INSULIN LISPRO 100 [IU]/ML
35 INJECTION, SOLUTION INTRAVENOUS; SUBCUTANEOUS
Qty: 30 PEN | Refills: 1 | Status: SHIPPED | OUTPATIENT
Start: 2020-06-24 | End: 2020-07-06 | Stop reason: ALTCHOICE

## 2020-06-28 DIAGNOSIS — G47.33 OBSTRUCTIVE SLEEP APNEA: Primary | ICD-10-CM

## 2020-06-29 ENCOUNTER — TELEPHONE (OUTPATIENT)
Dept: SLEEP CENTER | Facility: CLINIC | Age: 60
End: 2020-06-29

## 2020-07-06 ENCOUNTER — OFFICE VISIT (OUTPATIENT)
Dept: ENDOCRINOLOGY | Facility: CLINIC | Age: 60
End: 2020-07-06
Payer: MEDICARE

## 2020-07-06 ENCOUNTER — TELEPHONE (OUTPATIENT)
Dept: NEUROLOGY | Facility: CLINIC | Age: 60
End: 2020-07-06

## 2020-07-06 VITALS
DIASTOLIC BLOOD PRESSURE: 78 MMHG | BODY MASS INDEX: 43.4 KG/M2 | HEART RATE: 68 BPM | HEIGHT: 69 IN | WEIGHT: 293 LBS | SYSTOLIC BLOOD PRESSURE: 140 MMHG

## 2020-07-06 DIAGNOSIS — R80.9 MICROALBUMINURIA: ICD-10-CM

## 2020-07-06 DIAGNOSIS — E04.1 THYROID NODULE: ICD-10-CM

## 2020-07-06 DIAGNOSIS — I10 BENIGN HYPERTENSION: ICD-10-CM

## 2020-07-06 DIAGNOSIS — E78.5 HYPERLIPIDEMIA, UNSPECIFIED HYPERLIPIDEMIA TYPE: ICD-10-CM

## 2020-07-06 DIAGNOSIS — E55.9 VITAMIN D DEFICIENCY: ICD-10-CM

## 2020-07-06 DIAGNOSIS — IMO0002 DIABETES MELLITUS TYPE 2 WITH COMPLICATIONS, UNCONTROLLED: Primary | ICD-10-CM

## 2020-07-06 PROCEDURE — 3078F DIAST BP <80 MM HG: CPT | Performed by: PHYSICIAN ASSISTANT

## 2020-07-06 PROCEDURE — 1036F TOBACCO NON-USER: CPT | Performed by: PHYSICIAN ASSISTANT

## 2020-07-06 PROCEDURE — 3060F POS MICROALBUMINURIA REV: CPT | Performed by: PHYSICIAN ASSISTANT

## 2020-07-06 PROCEDURE — 3066F NEPHROPATHY DOC TX: CPT | Performed by: PHYSICIAN ASSISTANT

## 2020-07-06 PROCEDURE — 3046F HEMOGLOBIN A1C LEVEL >9.0%: CPT | Performed by: PHYSICIAN ASSISTANT

## 2020-07-06 PROCEDURE — 2026F EYE IMG VALID EVC RTNOPTHY: CPT | Performed by: PHYSICIAN ASSISTANT

## 2020-07-06 PROCEDURE — 3008F BODY MASS INDEX DOCD: CPT | Performed by: PHYSICIAN ASSISTANT

## 2020-07-06 PROCEDURE — 3077F SYST BP >= 140 MM HG: CPT | Performed by: PHYSICIAN ASSISTANT

## 2020-07-06 PROCEDURE — 99215 OFFICE O/P EST HI 40 MIN: CPT | Performed by: PHYSICIAN ASSISTANT

## 2020-07-06 NOTE — PROGRESS NOTES
Established Patient Progress Note      Chief Complaint   Patient presents with    Diabetes Type 2        History of Present Illness:   Queenie Forbes is a 62 y o  female with a history of type 2 diabetes with long term use of insulin since many years ago  Reports complications of retinopathy and microalbuminuria  Denies recent illness or hospitalizations  Denies recent severe hypoglycemic or severe hyperglycemic episodes  Denies any issues with her current regimen  home glucose monitoring: are performed regularly on average 2x per day- average glucose 255  Range 137-494  Dexcom coming in the mail  Prednisone 7mg daily-- slowly reducing per rheumatology  Changing diet, has seen Melita Call RD  Choosing antiinflammatory foods which is helping  Now on long term disability  Plans to do more walking, has been doing stairs at her apartment for exercise  Since last visit started using CPAP, fatigue continues, but has significantly improved       Current regimen:   Metformin ER 500mg-- 4 tabs daily   Basaglar 60 units twice per day   Humalog 35 units before each meal     Last Eye Exam: UTD  Last Foot Exam: UTD    Has hypertension: Taking losartan and diltiazem  Has hyperlipidemia: Taking atorvastatin    Thyroid disorders: nodule- had FNA 5/2020 which showed no malignancy    Patient Active Problem List   Diagnosis    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, with long-term current use of insulin (HCC)    Benign hypertension    Seizures (HCC)    Exertional dyspnea    Enlarged pulmonary artery (HCC)    Aortic dilatation (HCC)    Anemia    Decreased pulses in feet    Diabetes mellitus type 2 with complications, uncontrolled (HCC)    Hyperlipidemia    Microalbuminuria    Obstructive sleep apnea    Class 3 severe obesity with serious comorbidity and body mass index (BMI) of 45 0 to 49 9 in adult (HCC)    Peripheral neuropathy    Prolonged QT interval    Visual impairment in both eyes    Vitamin D deficiency    Lung nodules    Orthostatic hypotension    Mild intermittent asthma with exacerbation    Type 2 diabetes mellitus with microalbuminuria, with long-term current use of insulin (HCC)    Frequent headaches    Other inflammatory and immune myopathies, not elsewhere classified    Transaminitis    Morbid obesity ([facility] [address] [facility]    Polyneuropathy associated with underlying disease ([facility] [address] )    PMR (polymyalgia rheumatica) ([facility] [address] )    Thrombocytosis ([facility] [address] )    Left cavernous carotid aneurysm    Type 2 diabetes mellitus with hyperglycemia, with long-term current use of insulin (HCC)    Aneurysm (HCC)    Thyroid nodule    History of absence seizures    Hypersomnia    Migraine without aura and without status migrainosus, not intractable    Cerebral aneurysm without rupture    Chronic migraine without aura without status migrainosus, not intractable      Past Medical History:   Diagnosis Date    Anemia     Arthritis     Diabetes mellitus ([facility] [address] )     Dyspnea on exertion     Hyperlipidemia     Hypertension     Legally blind 2010    Mild intermittent asthma with exacerbation 8/4/2018    Miscarriage     x 3    Seizures (HCC)     Sickle cell trait ([facility] [address] )     Sleep apnea     Thyroid nodule 3/6/2020      Past Surgical History:   Procedure Laterality Date    CATARACT EXTRACTION Bilateral     august and september    EYE SURGERY      HYSTERECTOMY      42    OOPHORECTOMY Left     39    GA TEMPORAL ARTERY LIGATN OR BX Bilateral 10/17/2019    Procedure: BIOPSY ARTERY TEMPORAL;  Surgeon: DO Mara;  [facility]: [facility];  Service: Vascular    US GUIDED THYROID BIOPSY  5/13/2020      Family History   Problem Relation Age of Onset    Heart attack Mother     Heart disease Mother     Hypertension Mother     No Known Problems Father     Heart disease Sister     No Known Problems Brother     No Known Problems Son     No Known Problems Daughter     Heart attack Maternal Grandmother     Heart disease Maternal Grandmother     Diabetes Other     Heart attack Other     No Known Problems Sister     No Known Problems Sister     No Known Problems Paternal Aunt     No Known Problems Son     Cancer Neg Hx     Stroke Neg Hx      Social History     Tobacco Use    Smoking status: Never Smoker    Smokeless tobacco: Never Used   Substance Use Topics    Alcohol use: Never     Alcohol/week: 0 0 standard drinks     Frequency: Never     Drinks per session: Patient refused     Binge frequency: Never     No Known Allergies      Current Outpatient Medications:     albuterol (PROVENTIL HFA) 90 mcg/act inhaler, Inhale 2 puffs every 6 (six) hours as needed for wheezing For another 24 hours use every 4 hours standing, Disp: 6 7 g, Rfl: 0    aspirin (ADULT ASPIRIN EC LOW STRENGTH) 81 mg EC tablet, Take 81 mg by mouth daily , Disp: , Rfl:     atorvastatin (LIPITOR) 40 mg tablet, Take 1 tablet (40 mg total) by mouth daily, Disp: 90 tablet, Rfl: 1    budesonide-formoterol (SYMBICORT) 80-4 5 MCG/ACT inhaler, Inhale 2 puffs 2 (two) times a day Rinse mouth after use   (Patient taking differently: Inhale 2 puffs as needed Rinse mouth after use ), Disp: 1 Inhaler, Rfl: 5    Cholecalciferol (HM Vitamin D3) 100 MCG (4000 UT) CAPS, 4000 units daily, Disp: 60 capsule, Rfl: 5    diltiazem (CARDIZEM CD) 240 mg 24 hr capsule, Take 2 capsules (480 mg total) by mouth daily, Disp: 180 capsule, Rfl: 1    ferrous sulfate 324 (65 Fe) mg, Take 1 tablet (324 mg total) by mouth every other day, Disp: 45 tablet, Rfl: 3    gabapentin (NEURONTIN) 100 mg capsule, Take 1 capsule in am and 2-3 capsules at night, Disp: 90 capsule, Rfl: 3    hydrochlorothiazide (HYDRODIURIL) 25 mg tablet, Take 25 mg by mouth daily , Disp: , Rfl:     losartan (COZAAR) 100 MG tablet, Take 1 tablet (100 mg total) by mouth daily, Disp: 30 tablet, Rfl: 5    magnesium oxide (MAG-OX) 400 mg, TAKE 1 TABLET BY MOUTH EVERY DAY, Disp: 30 tablet, Rfl: 0    metFORMIN (GLUCOPHAGE-XR) 500 mg 24 hr tablet, Titrate as directed to 4 tabs daily, Disp: 120 tablet, Rfl: 3    predniSONE 1 mg tablet, Combine to take 7 mg daily  , Disp: 60 tablet, Rfl: 0    predniSONE 5 mg tablet, Combine to take 7 mg daily  , Disp: 30 tablet, Rfl: 1    topiramate (TOPAMAX) 100 mg tablet, 1 tab qhs, Disp: 30 tablet, Rfl: 2    [employer] MICROLET LANCETS lancets, Inject 1 applicator into the skin 4 (four) times a day, Disp: , Rfl:     Blood Glucose Monitoring Suppl (CONTOUR NEXT MONITOR) w/Device KIT, Disp 1 meter dx E11 9, may submitted any contour next meter  If not covered let us know we can change strips, Disp: 1 kit, Rfl: 1    Blood Pressure Monitor KIT, Check blood pressures BID, Disp: 1 each, Rfl: 0    CONTOUR NEXT TEST test strip, Test 3x daily, Disp: 300 each, Rfl: 3    Diclofenac Sodium POWD, , Disp: , Rfl:     Insulin Pen Needle (BD PEN NEEDLE DERRICK U/F) 32G X 4 MM MISC, Inject 1 applicator under the skin 4 (four) times a day, Disp: , Rfl:     Insulin Regular Human, Conc, (HumuLIN R U-500 KwikPen) 500 units/mL CONCENTRATED U-500 injection pen, 110 units before breakfast, 70 units before dinner, Disp: 4 pen, Rfl: 3    Riboflavin 400 MG TABS, Take 1 tablet (400 mg total) by mouth daily, Disp: 30 tablet, Rfl: 0    Review of Systems   Constitutional: Positive for fatigue  Negative for activity change, appetite change, chills, diaphoresis, fever and unexpected weight change  HENT: Negative for trouble swallowing and voice change  Eyes: Positive for visual disturbance  Respiratory: Negative for shortness of breath  Cardiovascular: Negative for chest pain and palpitations  Gastrointestinal: Negative for abdominal pain, constipation and diarrhea  Endocrine: Negative for cold intolerance, heat intolerance, polydipsia, polyphagia and polyuria  Genitourinary: Negative for frequency and menstrual problem     Musculoskeletal: Positive for arthralgias and gait problem  Negative for myalgias  Skin: Negative for rash  Allergic/Immunologic: Negative for food allergies  Neurological: Negative for dizziness and tremors  Hematological: Negative for adenopathy  Psychiatric/Behavioral: Negative for sleep disturbance  All other systems reviewed and are negative  Physical Exam:  Body mass index is 50 68 kg/m²  /78   Pulse 68   Ht 5' 8 5" (1 74 m)   Wt (!) 153 kg (338 lb 3 2 oz)   BMI 50 68 kg/m²    Wt Readings from Last 3 Encounters:   07/06/20 (!) 153 kg (338 lb 3 2 oz)   03/25/20 (!) 142 kg (314 lb)   03/18/20 (!) 142 kg (314 lb)       Physical Exam   Constitutional: She is oriented to person, place, and time  She appears well-developed and well-nourished  No distress  HENT:   Head: Normocephalic and atraumatic  Eyes: Pupils are equal, round, and reactive to light  Conjunctivae are normal    Neck: Normal range of motion  Neck supple  No thyromegaly present  Cardiovascular: Normal rate, regular rhythm and normal heart sounds  Pulmonary/Chest: Effort normal and breath sounds normal  No respiratory distress  She has no wheezes  She has no rales  Abdominal: Soft  Bowel sounds are normal  She exhibits no distension  There is no tenderness  Musculoskeletal: Normal range of motion  She exhibits no edema  Neurological: She is alert and oriented to person, place, and time  Skin: Skin is warm and dry  Psychiatric: She has a normal mood and affect  Vitals reviewed        Labs:   Lab Results   Component Value Date    HGBA1C 12 0 (H) 05/04/2020    HGBA1C 9 3 (H) 12/06/2019    HGBA1C 14 6 (H) 08/22/2019     Lab Results   Component Value Date    CREATININE 1 04 05/04/2020    CREATININE 0 94 03/06/2020    CREATININE 0 98 03/05/2020    BUN 19 05/04/2020     01/04/2016    K 3 5 (L) 05/04/2020     05/04/2020    CO2 29 05/04/2020     eGFR   Date Value Ref Range Status   05/04/2020 67 >60 ml/min/1 73sq m Final     Lab Results   Component Value Date    CHOL 130 10/08/2015    HDL 51 05/04/2020    TRIG 97 05/04/2020     Lab Results   Component Value Date    ALT 19 05/04/2020    AST 16 05/04/2020    ALKPHOS 161 (H) 05/04/2020    BILITOT 0 69 01/04/2016     Lab Results   Component Value Date    HMS7EPWSLUGE 1 820 05/04/2020    BAR0CFLRNZAJ 0 736 12/06/2019    VXG3CTXXMNDJ 1 710 09/16/2019     Lab Results   Component Value Date    FREET4 0 96 05/04/2020       Impression & Plan:    Problem List Items Addressed This Visit        Endocrine    Diabetes mellitus type 2 with complications, uncontrolled ([facility] [address] ) - Primary     Remains poorly controlled with Last A1C of 12 0 and Currently glucometer download shows average glucose 255 over past two weeks and she is above target most of the time  She is on a total of 225 units per day of insulin plus correctional insulin   Will D/C Basal/bolus regimen and start Humulin R U500 Kwikpen-- 110 units before breakfast and 70 units before dinner  Check BG 3-4x per day and send log to office for review  She will be getting DexCom G6 CGM- it is currently being shipped to her home  I told her I think it would be best if she gets training from nurse educator before starting dexcom- referral placed  Complete lab testing for A1C/BMP before next visit  Lab Results   Component Value Date    HGBA1C 12 0 (H) 05/04/2020            Relevant Medications    Insulin Regular Human, Conc, (HumuLIN R U-500 KwikPen) 500 units/mL CONCENTRATED U-500 injection pen    Other Relevant Orders    Ambulatory referral to Diabetic Education    Hemoglobin Z3R    Basic metabolic panel    Thyroid nodule     Had FNA 5/2020 that showed no malignancy  Cardiovascular and Mediastinum    Benign hypertension     Not at goal today, but stable, continue current regimen  Relevant Orders    Hemoglobin X2V    Basic metabolic panel       Other    Hyperlipidemia     Continue atorvastatin  Microalbuminuria     Continue losartan  Vitamin D deficiency     Continue supplements  Orders Placed This Encounter   Procedures    Hemoglobin A1C     Standing Status:   Future     Standing Expiration Date:   1/6/2021    Basic metabolic panel     This is a patient instruction: Patient fasting for 8 hours or longer recommended  Standing Status:   Future     Standing Expiration Date:   1/6/2021    Ambulatory referral to Diabetic Education     Standing Status:   Future     Standing Expiration Date:   7/6/2021     Referral Priority:   Routine     Referral Type:   Consult - AMB     Referral Reason:   Specialty Services Required     Requested Specialty:   Diabetes Services     Number of Visits Requested:   1     Expiration Date:   7/6/2021       There are no Patient Instructions on file for this visit  Discussed with the patient and all questioned fully answered  She will call me if any problems arise  Follow-up appointment in 1-2 months       Counseled patient on diagnostic results, prognosis, risk and benefit of treatment options, instruction for management, importance of treatment compliance, Risk  factor reduction and impressions    Sugey Hernandez PA-C

## 2020-07-06 NOTE — ASSESSMENT & PLAN NOTE
Remains poorly controlled with Last A1C of 12 0 and Currently glucometer download shows average glucose 255 over past two weeks and she is above target most of the time  She is on a total of 225 units per day of insulin plus correctional insulin   Will D/C Basal/bolus regimen and start Humulin R U500 Kwikpen-- 110 units before breakfast and 70 units before dinner  Check BG 3-4x per day and send log to office for review  She will be getting DexCom G6 CGM- it is currently being shipped to her home  I told her I think it would be best if she gets training from nurse educator before starting dexcom- referral placed  Complete lab testing for A1C/BMP before next visit     Lab Results   Component Value Date    HGBA1C 12 0 (H) 05/04/2020

## 2020-07-06 NOTE — TELEPHONE ENCOUNTER
Left patient a message to give me a call back regarding her appt with [address] on 07/23/20 @11:15  Would like to see if patient can come in at 10:45 instead

## 2020-07-09 DIAGNOSIS — G43.709 CHRONIC MIGRAINE WITHOUT AURA WITHOUT STATUS MIGRAINOSUS, NOT INTRACTABLE: ICD-10-CM

## 2020-07-09 DIAGNOSIS — M35.3 POLYMYALGIA RHEUMATICA (HCC): ICD-10-CM

## 2020-07-09 DIAGNOSIS — Z71.89 COMPLEX CARE COORDINATION: Primary | ICD-10-CM

## 2020-07-09 RX ORDER — PREDNISONE 1 MG/1
TABLET ORAL
Qty: 60 TABLET | Refills: 0 | Status: SHIPPED | OUTPATIENT
Start: 2020-07-09 | End: 2020-09-23

## 2020-07-09 NOTE — TELEPHONE ENCOUNTER
Patient needs to be scheduled for a follow-up with me  If she cannot come into the office, maybe we can offer a virtual   Thanks

## 2020-07-10 ENCOUNTER — PATIENT OUTREACH (OUTPATIENT)
Dept: FAMILY MEDICINE CLINIC | Facility: CLINIC | Age: 60
End: 2020-07-10

## 2020-07-10 NOTE — PROGRESS NOTES
Called patient and explained my role to her  She stated she needs to call me back  Will await her call  I will continue further outreach if she does not call me back  Note: patient declined my services 1/20  Chart reviewed

## 2020-07-12 RX ORDER — TOPIRAMATE 100 MG/1
TABLET, FILM COATED ORAL
Qty: 90 TABLET | Refills: 0 | Status: SHIPPED | OUTPATIENT
Start: 2020-07-12 | End: 2020-09-15 | Stop reason: SDUPTHER

## 2020-07-13 ENCOUNTER — PATIENT OUTREACH (OUTPATIENT)
Dept: FAMILY MEDICINE CLINIC | Facility: CLINIC | Age: 60
End: 2020-07-13

## 2020-07-13 NOTE — PROGRESS NOTES
Called patient to follow up  Patient states she was having trouble with her CPAP mask, having the feeling of suffocating  She states she is supposed to receive a smaller mask that would only cover her nose  She notes her asthma flares with increased humidity and tries to stay indoors in the air conditioning  Patient notes she is fatigued because she has not been sleeping well because of the mask issue  She notes she feels better sleeping elevated  Patient continues to use her MDI's  Patient notes she has been having health insurance issues stating she recently lost her job and lost her insurance coverage as well  She stated she had to cancel many upcoming specialist appointments  She notes she is on disability but has recently applied for supplemental insurance  Patient reports she continues on steroids  She notes her blood sugar this morning was 199 but yesterday's reading was 117  She states she watches her portion sizes and has decreased her carbs  She eats fresh fruit, drinks mostly water with an occasional soda  She notes she does see a nutritionist  Patient states she has been avoiding foods that cause inflammation, ie, tomatoes, eggplant since she has joint swelling/pain of her hands and wrists  Patient also reports she checks her blood pressure and it has been normal   She has been taking her medications and trying to keep her stress level down  Patient has my contact information if anything is needed  Will continue to follow

## 2020-07-20 ENCOUNTER — PATIENT OUTREACH (OUTPATIENT)
Dept: FAMILY MEDICINE CLINIC | Facility: CLINIC | Age: 60
End: 2020-07-20

## 2020-07-20 NOTE — PROGRESS NOTES
Called patient to follow up  She states she is doing well  She notes she received a new mask for her CPAP which is working much better for her  She states her sleep is now a bit improved because of this  Patient reports her blood sugars have been doing good with this morning's fasting reading at 154  She notes she is eating much healthier now, drinking water and trying new recipes  She is also spacing out her meals a bit  She states she continues on 7mg of steroids for the rest of the month and this may decrease next month  Patient states she does check her feet daily  Patient notes she is using her time wisely since she is staying indoors because of the heat and humidity by de-cluttering her home and getting organized  She states she continues to exercise by walking in her apartment or goes to the grocery store walking up and down every aisle  Patient states she will be getting a new prescription plan and insurance starting August 1  She notes she had to cancel a number of appointment but is rescheduling them for August and September  I will continue to follow patient and plan to do my assessment with my next call

## 2020-07-29 ENCOUNTER — TELEPHONE (OUTPATIENT)
Dept: NEUROLOGY | Facility: CLINIC | Age: 60
End: 2020-07-29

## 2020-07-29 NOTE — TELEPHONE ENCOUNTER
LVM for earlier appt with [address] 8:15 in [place] - please assist pt if she desires

## 2020-08-05 ENCOUNTER — PATIENT OUTREACH (OUTPATIENT)
Dept: FAMILY MEDICINE CLINIC | Facility: CLINIC | Age: 60
End: 2020-08-05

## 2020-08-05 NOTE — PROGRESS NOTES
Called patient to follow up and start assessment  Patient informed me she ran out of her insulin and used her last pen over the weekend  Patient reports she is in the middle of changing insurance and this may not be complete for quite some time  Since then she has been "modifying" her medication regimen stating she is doubling up on metformin (#2 in the morning and #2 in the afternoon) and increasing her long-acting insulin to 60U in the morning and afternoon  Patient notes her blood sugar has been hovering around 200  Patient also notes she has changed her blood pressure medications as well  She stated she was taking only #1 diltiazem per day and now is taking #2 pills per day (although per med list directions she was supposed to be taking #2 capsules -480mg- daily)  Since patient has changed her medications on her own and is out of insulin, I deferred to doing my assessment today to see if there is help getting her insulin  I will in-basket a message to Seb for their input

## 2020-08-05 NOTE — PROGRESS NOTES
The diltiazem is correct with 2 capsules  The metformin looks like she should have been on 4 capsules a day anyway  We will wait to hear response from saulo barron

## 2020-08-11 ENCOUNTER — TELEPHONE (OUTPATIENT)
Dept: DIABETES SERVICES | Facility: CLINIC | Age: 60
End: 2020-08-11

## 2020-08-11 NOTE — TELEPHONE ENCOUNTER
Panda Hamm was scheduled for a virtual visit today  Phoned patient to remind her of her virtual visit as she had not joined the Bruce De León  visit  Patient was just waking up after a poor night sleep using her C-PAP machine  Patient did not wish to have the visit at this time  Requests to have someone call her to reschedule the appointment

## 2020-08-12 DIAGNOSIS — IMO0002 UNCONTROLLED TYPE 2 DIABETES MELLITUS WITH OPHTHALMIC COMPLICATION, WITH LONG-TERM CURRENT USE OF INSULIN: ICD-10-CM

## 2020-08-12 DIAGNOSIS — I10 ESSENTIAL HYPERTENSION: ICD-10-CM

## 2020-08-12 NOTE — TELEPHONE ENCOUNTER
Patient's insurance changed and she now needs to use [employer]  Please authorize scripts  Thanks!     Last appt, 7/6/20    Next appt, 8/18/20

## 2020-08-13 PROCEDURE — 4010F ACE/ARB THERAPY RXD/TAKEN: CPT | Performed by: PHYSICIAN ASSISTANT

## 2020-08-13 RX ORDER — METFORMIN HYDROCHLORIDE 500 MG/1
1000 TABLET, EXTENDED RELEASE ORAL 2 TIMES DAILY WITH MEALS
Qty: 120 TABLET | Refills: 5 | Status: SHIPPED | OUTPATIENT
Start: 2020-08-13 | End: 2020-09-08 | Stop reason: SDUPTHER

## 2020-08-13 RX ORDER — LOSARTAN POTASSIUM 100 MG/1
100 TABLET ORAL DAILY
Qty: 30 TABLET | Refills: 5 | Status: SHIPPED | OUTPATIENT
Start: 2020-08-13 | End: 2020-09-08 | Stop reason: SDUPTHER

## 2020-08-19 ENCOUNTER — TELEMEDICINE (OUTPATIENT)
Dept: DIABETES SERVICES | Facility: CLINIC | Age: 60
End: 2020-08-19
Payer: MEDICARE

## 2020-08-19 DIAGNOSIS — E11.39 TYPE 2 DIABETES MELLITUS WITH OTHER OPHTHALMIC COMPLICATION, WITH LONG-TERM CURRENT USE OF INSULIN (HCC): Primary | ICD-10-CM

## 2020-08-19 DIAGNOSIS — Z79.4 TYPE 2 DIABETES MELLITUS WITH OTHER OPHTHALMIC COMPLICATION, WITH LONG-TERM CURRENT USE OF INSULIN (HCC): Primary | ICD-10-CM

## 2020-08-19 PROCEDURE — G0270 MNT SUBS TX FOR CHANGE DX: HCPCS | Performed by: DIETITIAN, REGISTERED

## 2020-08-19 NOTE — PROGRESS NOTES
Virtual Regular Visit      Assessment/Plan:    Problem List Items Addressed This Visit     None               Reason for visit is   Chief Complaint   Patient presents with    Virtual Regular Visit        Encounter provider Renato Jiménez    Provider located at [address] [facility],[address]  [address] [id number]      Recent Visits  No visits were found meeting these conditions  Showing recent visits within past 7 days and meeting all other requirements     Future Appointments  No visits were found meeting these conditions  Showing future appointments within next 150 days and meeting all other requirements        The patient was identified by name and date of birth  Venice Wilburn was informed that this is a telemedicine visit and that the visit is being conducted through [employer]  My office door was closed  No one else was in the room  She acknowledged consent and understanding of privacy and security of the video platform  The patient has agreed to participate and understands they can discontinue the visit at any time  Patient is aware this is a billable service  Subjective  Venice Wilburn is a 62 y o  female T2DM  See MNT note below        HPI     Past Medical History:   Diagnosis Date    Anemia     Arthritis     Diabetes mellitus ([address] )     Dyspnea on exertion     Hyperlipidemia     Hypertension     Legally blind 2010    Mild intermittent asthma with exacerbation 8/4/2018    Miscarriage     x 3    Seizures (HCC)     Sickle cell trait ([facility] )     Sleep apnea     Thyroid nodule 3/6/2020       Past Surgical History:   Procedure Laterality Date    CATARACT EXTRACTION Bilateral     august and september    EYE SURGERY      HYSTERECTOMY      39    OOPHORECTOMY Left     39    NC TEMPORAL ARTERY LIGATN OR BX Bilateral 10/17/2019    Procedure: BIOPSY ARTERY TEMPORAL;  Surgeon: Lam Burrell DO;  Location: [facility] OR;  Service: Vascular    US GUIDED THYROID BIOPSY  5/13/2020       Current Outpatient Medications   Medication Sig Dispense Refill    albuterol (PROVENTIL HFA) 90 mcg/act inhaler Inhale 2 puffs every 6 (six) hours as needed for wheezing For another 24 hours use every 4 hours standing 6 7 g 0    aspirin (ADULT ASPIRIN EC LOW STRENGTH) 81 mg EC tablet Take 81 mg by mouth daily       atorvastatin (LIPITOR) 40 mg tablet Take 1 tablet (40 mg total) by mouth daily 90 tablet 1    iFulfillment MICROLET LANCETS lancets Inject 1 applicator into the skin 4 (four) times a day      Blood Glucose Monitoring Suppl (CONTOUR NEXT MONITOR) w/Device KIT Disp 1 meter dx E11 9, may submitted any contour next meter  If not covered let us know we can change strips 1 kit 1    Blood Pressure Monitor KIT Check blood pressures BID 1 each 0    budesonide-formoterol (SYMBICORT) 80-4 5 MCG/ACT inhaler Inhale 2 puffs 2 (two) times a day Rinse mouth after use   (Patient taking differently: Inhale 2 puffs as needed Rinse mouth after use ) 1 Inhaler 5    Cholecalciferol (HM Vitamin D3) 100 MCG (4000 UT) CAPS 4000 units daily 60 capsule 5    CONTOUR NEXT TEST test strip Test 3x daily 300 each 3    Diclofenac Sodium POWD       diltiazem (CARDIZEM CD) 240 mg 24 hr capsule Take 2 capsules (480 mg total) by mouth daily 180 capsule 1    ferrous sulfate 324 (65 Fe) mg Take 1 tablet (324 mg total) by mouth every other day 45 tablet 3    gabapentin (NEURONTIN) 100 mg capsule Take 1 capsule in am and 2-3 capsules at night 90 capsule 3    hydrochlorothiazide (HYDRODIURIL) 25 mg tablet Take 25 mg by mouth daily       Insulin Pen Needle (BD PEN NEEDLE DERRICK U/F) 32G X 4 MM MISC Inject 1 applicator under the skin 4 (four) times a day      Insulin Regular Human, Conc, (HumuLIN R U-500 KwikPen) 500 units/mL CONCENTRATED U-500 injection pen 110 units before breakfast, 70 units before dinner 4 pen 3    losartan (COZAAR) 100 MG tablet Take 1 tablet (100 mg total) by mouth daily 30 tablet 5    magnesium oxide (MAG-OX) 400 mg TAKE 1 TABLET BY MOUTH EVERY DAY 30 tablet 0    metFORMIN (GLUCOPHAGE-XR) 500 mg 24 hr tablet Take 2 tablets (1,000 mg total) by mouth 2 (two) times a day with meals 120 tablet 5    predniSONE 1 mg tablet COMBINE WITH THE 5MG TABLETS TO TAKE 7 MG TOTAL DAILY 60 tablet 0    predniSONE 5 mg tablet Combine to take 7 mg daily  30 tablet 1    Riboflavin 400 MG TABS Take 1 tablet (400 mg total) by mouth daily 30 tablet 0    topiramate (TOPAMAX) 100 mg tablet TAKE 1 TABLET BY MOUTH AT BEDTIME 90 tablet 0     No current facility-administered medications for this visit  No Known Allergies    Review of Systems    Video Exam    There were no vitals filed for this visit  Physical Exam     I spent 30 minutes directly with the patient during this visit      VIRTUAL VISIT DISCLAIMER    Malachi Laureano acknowledges that she has consented to an online visit or consultation  She understands that the online visit is based solely on information provided by her, and that, in the absence of a face-to-face physical evaluation by the physician, the diagnosis she receives is both limited and provisional in terms of accuracy and completeness  This is not intended to replace a full medical face-to-face evaluation by the physician  Malachi Laureano understands and accepts these terms  Medical Nutrition Therapy        Assessment    Visit Type: Initial visit  Chief complaint/Medical Diagnosis/reason for visit E11 39 (T2DM with ophthalmic complications, with long term insulin)    BREANNE Deleon was seen for a virtual follow-up MNT visit  Patient reports fingersticks averaging around 150  Pancho recently started on Medicare after her benefits were terminated secondary to job termination  Pancho has not started U-500 insulin secondary to lack of insurance coverage  She has been using the remainder of her Humalog and Basaglar until Medicare coverage starts   Patient has not been keeping food logs  She reports watching what she eats and portion controlling by using small bowls  She is having difficulty getting to the store due to increased [employer] rates since [place]  Ngozi Wheeler occasionally orders food through [employer], but reports that as costly as well  Discussed stocking her pantry with inexpensive nutrient dense foods such as dried legumes and whole grain rice  Ngozi Wheeler reports she is already doing this  She reports being creative with her meals  Patient agreed to keep daily food logs for future follow-up visits  Follow-up was suggested in 2 months  RD will remain available for further dietary questions/concerns  Ht Readings from Last 1 Encounters:   07/06/20 5' 8 5" (1 74 m)     Wt Readings from Last 3 Encounters:   07/06/20 (!) 153 kg (338 lb 3 2 oz)   03/25/20 (!) 142 kg (314 lb)   03/18/20 (!) 142 kg (314 lb)     Weight Change: patient is unsure of weight change as she does not have a scale at home  Exercise Ngozi Wheeler has been going for some short walks  Intervention: food diary     Treatment Goals: Patient will monitor food intake daily with tracker    Monitoring and evaluation:    Term code indicator   1 3 2 Food Intake Criteria: keep daily food logs    Patients Response to Instruction:  Izabel Markham  Expected Compliancegood    Start- Stop: 1:00-1:30  Total Minutes: 30 Minutes  Group or Individual Instruction: MNT-I  Other: Lesa Gowers, PA-C    Thank you for coming to the [facility] for education today  Please feel free to call with any questions or concerns      Isaura Krishnamurthy  [address] [place] [phone]

## 2020-08-21 ENCOUNTER — PATIENT OUTREACH (OUTPATIENT)
Dept: FAMILY MEDICINE CLINIC | Facility: CLINIC | Age: 60
End: 2020-08-21

## 2020-08-21 DIAGNOSIS — IMO0002 DIABETES MELLITUS TYPE 2 WITH COMPLICATIONS, UNCONTROLLED: ICD-10-CM

## 2020-08-21 RX ORDER — INSULIN HUMAN 500 [IU]/ML
INJECTION, SOLUTION SUBCUTANEOUS
Qty: 4 PEN | Refills: 3 | Status: SHIPPED | OUTPATIENT
Start: 2020-08-21 | End: 2020-09-08 | Stop reason: SDUPTHER

## 2020-08-21 NOTE — PROGRESS NOTES
I am sure you probably told her about goodrx for coupons   Unfortunately for U500 humulin it really doesn't help much though

## 2020-08-21 NOTE — PROGRESS NOTES
Called patient today and performed assessment  Patient states she has been completely out of insulin for two weeks and has only been taking metformin twice a day  She states she has been keeping a strict watch on her diet but this morning her fasting sugar was 476  She denies any symptoms today but notes that last night she felt nauseated and has been fatigued  I sent another in-basket message to patient's Endo to see if they can check into patient assistance programs for her insulin  Patient notes her frustration over not having insurance and unable to pay for medications  She reports she "found a bottle of statins" and states her pharmacy gave her some losartan  She notes she has been in contact with Social Security trying to get help financially  Patient states she would like to move out of her currently apartment noting her lease is up in October  She states there are fumes in basement that are bothersome and irritate her respiratory issues when she does her laundry  Patient notes she has not been leaving the house often because the cost of [employer] has increased and she is cutting back on many things because of her financial strains  I will continue to follow  Patient is aware of her Endo appointment 9/1  Patient was also given ED precautions if her sugar is too high and she becomes symptomatic

## 2020-08-25 ENCOUNTER — TELEPHONE (OUTPATIENT)
Dept: ENDOCRINOLOGY | Facility: CLINIC | Age: 60
End: 2020-08-25

## 2020-08-25 NOTE — TELEPHONE ENCOUNTER
----- Message from Francisca Osman RN sent at 8/21/2020  1:57 PM EDT -----  Julia Finder  I contacted you a few weeks ago re this patient  I understand you do not provide samples at your office but can you hook up the patient with any assistance programs for her insulin? She has been out for two weeks and her sugars are quite high  Thank you for any help!   Britney Groves, [address] [place] [address]

## 2020-08-25 NOTE — TELEPHONE ENCOUNTER
Evie form has been place on your desk to sign  Patient is out of u-500 and blood sugar have been high  This is a message I received from the RN  See message below

## 2020-08-25 NOTE — TELEPHONE ENCOUNTER
I can fill out tomorrow-- In the meantime I think we have in the closet 1 voucher for a free box of Humulin U500 pens-- can you see if pharmacy will fill that out? If not we can do samples of her previous insulins until she hears back from moe     If the Humulin R U500 voucher does not work, let me know will give instructions for whatever samples we have

## 2020-08-27 DIAGNOSIS — R80.9 TYPE 2 DIABETES MELLITUS WITH MICROALBUMINURIA, WITH LONG-TERM CURRENT USE OF INSULIN (HCC): ICD-10-CM

## 2020-08-27 DIAGNOSIS — E11.29 TYPE 2 DIABETES MELLITUS WITH MICROALBUMINURIA, WITH LONG-TERM CURRENT USE OF INSULIN (HCC): ICD-10-CM

## 2020-08-27 DIAGNOSIS — Z79.4 TYPE 2 DIABETES MELLITUS WITH MICROALBUMINURIA, WITH LONG-TERM CURRENT USE OF INSULIN (HCC): ICD-10-CM

## 2020-08-27 RX ORDER — BLOOD SUGAR DIAGNOSTIC
STRIP MISCELLANEOUS
Qty: 300 EACH | Refills: 3 | Status: SHIPPED | OUTPATIENT
Start: 2020-08-27 | End: 2021-10-20

## 2020-08-27 NOTE — TELEPHONE ENCOUNTER
Spoke with patient and she just fill her U-500 yesterday  I made her aware that patient assistance form will be mail out to her to U-500  We did fill out our part and a copy has been scanned into chart

## 2020-09-01 DIAGNOSIS — IMO0002 UNCONTROLLED TYPE 2 DIABETES MELLITUS WITH OPHTHALMIC COMPLICATION, WITH LONG-TERM CURRENT USE OF INSULIN: ICD-10-CM

## 2020-09-01 RX ORDER — BLOOD-GLUCOSE METER
EACH MISCELLANEOUS
Qty: 1 KIT | Refills: 1 | Status: SHIPPED | OUTPATIENT
Start: 2020-09-01 | End: 2021-10-20

## 2020-09-08 ENCOUNTER — OFFICE VISIT (OUTPATIENT)
Dept: ENDOCRINOLOGY | Facility: CLINIC | Age: 60
End: 2020-09-08
Payer: MEDICARE

## 2020-09-08 ENCOUNTER — TELEPHONE (OUTPATIENT)
Dept: ENDOCRINOLOGY | Facility: CLINIC | Age: 60
End: 2020-09-08

## 2020-09-08 VITALS
HEIGHT: 69 IN | SYSTOLIC BLOOD PRESSURE: 144 MMHG | TEMPERATURE: 98 F | DIASTOLIC BLOOD PRESSURE: 82 MMHG | BODY MASS INDEX: 43.4 KG/M2 | HEART RATE: 72 BPM | WEIGHT: 293 LBS

## 2020-09-08 DIAGNOSIS — IMO0002 DIABETES MELLITUS TYPE 2 WITH COMPLICATIONS, UNCONTROLLED: ICD-10-CM

## 2020-09-08 DIAGNOSIS — E78.49 OTHER HYPERLIPIDEMIA: ICD-10-CM

## 2020-09-08 DIAGNOSIS — E11.29 TYPE 2 DIABETES MELLITUS WITH MICROALBUMINURIA, WITH LONG-TERM CURRENT USE OF INSULIN (HCC): Primary | ICD-10-CM

## 2020-09-08 DIAGNOSIS — I10 ESSENTIAL HYPERTENSION: ICD-10-CM

## 2020-09-08 DIAGNOSIS — E11.65 TYPE 2 DIABETES MELLITUS WITH HYPERGLYCEMIA, WITH LONG-TERM CURRENT USE OF INSULIN (HCC): ICD-10-CM

## 2020-09-08 DIAGNOSIS — Z79.4 TYPE 2 DIABETES MELLITUS WITH MICROALBUMINURIA, WITH LONG-TERM CURRENT USE OF INSULIN (HCC): Primary | ICD-10-CM

## 2020-09-08 DIAGNOSIS — Z79.4 TYPE 2 DIABETES MELLITUS WITH HYPERGLYCEMIA, WITH LONG-TERM CURRENT USE OF INSULIN (HCC): ICD-10-CM

## 2020-09-08 DIAGNOSIS — E11.39 TYPE 2 DIABETES MELLITUS WITH OTHER OPHTHALMIC COMPLICATION, WITH LONG-TERM CURRENT USE OF INSULIN (HCC): ICD-10-CM

## 2020-09-08 DIAGNOSIS — E04.1 THYROID NODULE: ICD-10-CM

## 2020-09-08 DIAGNOSIS — E55.9 VITAMIN D DEFICIENCY: ICD-10-CM

## 2020-09-08 DIAGNOSIS — Z79.4 TYPE 2 DIABETES MELLITUS WITH OTHER OPHTHALMIC COMPLICATION, WITH LONG-TERM CURRENT USE OF INSULIN (HCC): ICD-10-CM

## 2020-09-08 DIAGNOSIS — E66.01 CLASS 3 SEVERE OBESITY DUE TO EXCESS CALORIES WITH SERIOUS COMORBIDITY AND BODY MASS INDEX (BMI) OF 45.0 TO 49.9 IN ADULT (HCC): ICD-10-CM

## 2020-09-08 DIAGNOSIS — I10 BENIGN HYPERTENSION: ICD-10-CM

## 2020-09-08 DIAGNOSIS — R80.9 TYPE 2 DIABETES MELLITUS WITH MICROALBUMINURIA, WITH LONG-TERM CURRENT USE OF INSULIN (HCC): Primary | ICD-10-CM

## 2020-09-08 DIAGNOSIS — IMO0002 UNCONTROLLED TYPE 2 DIABETES MELLITUS WITH OPHTHALMIC COMPLICATION, WITH LONG-TERM CURRENT USE OF INSULIN: ICD-10-CM

## 2020-09-08 PROBLEM — E11.9 DIABETES MELLITUS (HCC): Status: ACTIVE | Noted: 2020-09-08

## 2020-09-08 PROCEDURE — 99214 OFFICE O/P EST MOD 30 MIN: CPT | Performed by: INTERNAL MEDICINE

## 2020-09-08 RX ORDER — METFORMIN HYDROCHLORIDE 500 MG/1
1000 TABLET, EXTENDED RELEASE ORAL 2 TIMES DAILY WITH MEALS
Qty: 120 TABLET | Refills: 5 | Status: SHIPPED | OUTPATIENT
Start: 2020-09-08 | End: 2021-01-06 | Stop reason: SDUPTHER

## 2020-09-08 RX ORDER — ATORVASTATIN CALCIUM 40 MG/1
40 TABLET, FILM COATED ORAL DAILY
Qty: 90 TABLET | Refills: 1 | Status: SHIPPED | OUTPATIENT
Start: 2020-09-08 | End: 2021-02-16 | Stop reason: SDUPTHER

## 2020-09-08 RX ORDER — HYDROCHLOROTHIAZIDE 25 MG/1
25 TABLET ORAL DAILY
Qty: 90 TABLET | Refills: 1 | Status: SHIPPED | OUTPATIENT
Start: 2020-09-08 | End: 2021-02-16 | Stop reason: SDUPTHER

## 2020-09-08 RX ORDER — INSULIN HUMAN 500 [IU]/ML
INJECTION, SOLUTION SUBCUTANEOUS
Qty: 4 PEN | Refills: 3 | Status: SHIPPED | OUTPATIENT
Start: 2020-09-08 | End: 2021-01-06 | Stop reason: SDUPTHER

## 2020-09-08 RX ORDER — LOSARTAN POTASSIUM 100 MG/1
100 TABLET ORAL DAILY
Qty: 90 TABLET | Refills: 1 | Status: SHIPPED | OUTPATIENT
Start: 2020-09-08 | End: 2021-02-16 | Stop reason: SDUPTHER

## 2020-09-08 NOTE — PATIENT INSTRUCTIONS
Hypoglycemia in a Person with Diabetes   WHAT YOU NEED TO KNOW:   Hypoglycemia is a serious condition that happens when your blood glucose (sugar) level drops too low  The blood sugar level is usually too high in a person with diabetes, but the level can also drop too low  It is important to follow your diabetes management plan to keep your blood sugar level steady  DISCHARGE INSTRUCTIONS:   Call 911 for any of the following:   · You feel you are going to pass out  · You have a seizure or pass out  · You have trouble thinking clearly  Seek care immediately if:  · Your blood sugar is less than 50 mg/dL and does not respond to treatment  Contact your healthcare provider if:   · You have had symptoms of low blood sugar several times  · You have questions about the amount of insulin or diabetes medicine you are taking  · You have questions or concerns about your condition or care  Medicines:   · Insulin or diabetes medicine  help to keep your blood sugar under control  · Glucagon  may be needed if you have severe hypoglycemia  · Take your medicine as directed  Contact your healthcare provider if you think your medicine is not helping or if you have side effects  Tell him or her if you are allergic to any medicine  Keep a list of the medicines, vitamins, and herbs you take  Include the amounts, and when and why you take them  Bring the list or the pill bottles to follow-up visits  Carry your medicine list with you in case of an emergency  Follow up with your healthcare provider or specialist as directed: You may need dose changes to your insulin or oral diabetes medicine if you have hypoglycemia  Write down your questions so you remember to ask them during your visits  Manage hypoglycemia:   · Check your blood sugar level right away if you have symptoms of hypoglycemia  Hypoglycemia is usually 70 mg/dL or below   Ask your healthcare provider what blood sugar level is too low for you     · If your blood sugar level is too low, eat or drink 15 grams of fast-acting carbohydrate  Examples of this amount of fast-acting carbohydrate are 4 ounces (½ cup) of fruit juice or 4 ounces of regular soda  Other examples are 2 tablespoons of raisins or 3 to 4 glucose tablets  Check your blood sugar level 15 minutes later  If the level is still low (less than 100 mg/dL), have another 15 grams of carbohydrate  When the level returns to 100 mg/dL, eat a snack or meal that contains carbohydrates  This will help prevent another drop in blood sugar  Always carefully follow your healthcare provider's instructions on how to treat low blood sugar levels  · Always carry a source of fast-acting carbohydrate  If you have symptoms of hypoglycemia and you do not have a blood glucose meter, have a source of fast-acting carbohydrate anyway  Avoid carbohydrate foods that are high in fat  The fat content may make it take longer to increase your blood sugar level  Ask your healthcare provider if you should carry a glucagon kit  Glucagon is a medicine that is injected when you develop severe hypoglycemia and become unconscious  Check the expiration date every month and replace it before it expires  · Teach others how to help you if you have symptoms of hypoglycemia  Tell them about the symptoms of hypoglycemia  Ask them to give you a source of fast-acting carbohydrate if you cannot get it yourself  Ask them to give you a glucagon injection if you have symptoms of hypoglycemia and you become unconscious or have a seizure  Ask them to call 911   This is an emergency  Tell them never to try to make you swallow anything if you faint or have a seizure  · Wear medical alert jewelry  or carry a card that says you have diabetes  Ask where to get these items  Prevent hypoglycemia:   · Take diabetes medicine as directed  Take your medicine at the right time and in the right amount   Your healthcare provider may change your blood sugar goals if you get hypoglycemia often  · Eat regular meals and snacks  Talk to your dietitian or healthcare provider about a meal plan that is right for you  Do not skip meals  · Check your blood sugar level as directed  Ask your healthcare provider what your blood sugar levels should be before and after you eat  Ask when and how often to check your blood sugar level  You may need to check at least 3 times each day  Record your blood sugar level results and take the record with you when you see your healthcare provider  Your provider may use the record to make changes to your medicine, food, or exercise schedules  · Check your blood sugar level before you exercise  Exercise can decrease your blood sugar level  If your blood sugar level is less than 100 mg/dL, have a carbohydrate snack  Examples are 4 to 6 crackers, ½ banana, 8 ounces (1 cup) of nonfat or 1% milk, or 4 ounces (½ cup) of juice  If you will exercise for more than 1 hour, you may need to check your blood sugar level every 30 minutes  Your healthcare provider may also recommend that you check your blood sugar level after exercise  · Be aware of how alcohol affects your blood sugar level  Alcohol can cause your blood sugar level to drop for up to 12 hours after drinking  Ask your healthcare provider if alcohol is safe for you  If you drink alcohol, always have a snack or meal at the same time  Women should limit alcohol to 1 drink a day  Men should limit alcohol to 2 drinks a day  A drink of alcohol is 12 ounces of beer, 5 ounces of wine, or 1½ ounces of liquor  © 2017 [address]  Information is for End User's use only and may not be sold, redistributed or otherwise used for commercial purposes  All illustrations and images included in CareNotes® are the copyrighted property of [employer] , [employer]  or Mac[employer] Hernández  The above information is an  only   It is not intended as medical advice for individual conditions or treatments  Talk to your doctor, nurse or pharmacist before following any medical regimen to see if it is safe and effective for you

## 2020-09-08 NOTE — PROGRESS NOTES
Ronald Citizen 62 y o  female MRN: [id number]    Encounter: [id number]      Assessment/Plan     Problem List Items Addressed This Visit        Endocrine    Diabetes mellitus ([facility] [address] )    Relevant Medications    Insulin Regular Human, Conc, (HumuLIN R U-500 KwikPen) 500 units/mL CONCENTRATED U-500 injection pen    metFORMIN (GLUCOPHAGE-XR) 500 mg 24 hr tablet    Other Relevant Orders    Comprehensive metabolic panel Lab Collect    HEMOGLOBIN A1C W/ EAG ESTIMATION Lab Collect    Microalbumin / creatinine urine ratio Lab Collect    Ambulatory referral to Diabetic Education    Comprehensive metabolic panel Lab Collect    HEMOGLOBIN A1C W/ EAG ESTIMATION Lab Collect    Ambulatory referral to Diabetic Education    Ambulatory referral to Diabetic Education    Diabetes mellitus type 2 with complications, uncontrolled (HCC)    Relevant Medications    Insulin Regular Human, Conc, (HumuLIN R U-500 KwikPen) 500 units/mL CONCENTRATED U-500 injection pen    metFORMIN (GLUCOPHAGE-XR) 500 mg 24 hr tablet    Thyroid nodule     Status post fine-needle aspiration biopsy earlier this year which was negative for malignancy  Repeat thyroid ultrasound in the next 6 months to monitor for any changes in the size or characteristics of the thyroid nodules         Relevant Orders    TSH, 3rd generation Lab Collect    T4, free Lab Collect    Type 2 diabetes mellitus with hyperglycemia, with long-term current use of insulin (HCC)       Lab Results   Component Value Date    HGBA1C 12 0 (H) 05/04/2020   Sugars have improved and he is she has had a couple of hypoglycemic episodes  For now decrease the pre dinner dose of U 500 regular insulin to 60 units  Advised to check blood sugars 3 times a day, before breakfast, before dinner and bedtime and send over log in 2 weeks    Also referred to see the nutritionist so she can be consistent with carb intake at meals         Relevant Medications    Insulin Regular Human, Conc, (HumuLIN R U-500 KwikPen) 500 units/mL CONCENTRATED U-500 injection pen    metFORMIN (GLUCOPHAGE-XR) 500 mg 24 hr tablet    Other Relevant Orders    Comprehensive metabolic panel Lab Collect    HEMOGLOBIN A1C W/ EAG ESTIMATION Lab Collect    Ambulatory referral to Diabetic Education    Type 2 diabetes mellitus with microalbuminuria, with long-term current use of insulin ([facility]) - Primary    Relevant Medications    Insulin Regular Human, Conc, (HumuLIN R U-500 KwikPen) 500 units/mL CONCENTRATED U-500 injection pen    metFORMIN (GLUCOPHAGE-XR) 500 mg 24 hr tablet    Other Relevant Orders    Comprehensive metabolic panel Lab Collect    HEMOGLOBIN A1C W/ EAG ESTIMATION Lab Collect    Microalbumin / creatinine urine ratio Lab Collect    Ambulatory referral to Diabetic Education    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, with long-term current use of insulin ([facility])       Lab Results   Component Value Date    HGBA1C 12 0 (H) 05/04/2020   Continue follow-up with ophthalmologist         Relevant Medications    Insulin Regular Human, Conc, (HumuLIN R U-500 KwikPen) 500 units/mL CONCENTRATED U-500 injection pen    metFORMIN (GLUCOPHAGE-XR) 500 mg 24 hr tablet       Cardiovascular and Mediastinum    Benign hypertension     Continue current regimen         Relevant Medications    hydrochlorothiazide (HYDRODIURIL) 25 mg tablet    losartan (COZAAR) 100 MG tablet       Other    Class 3 severe obesity with serious comorbidity and body mass index (BMI) of 45 0 to 49 9 in adult [facility])     Focus on dietary and lifestyle modifications and weight loss, referred for MNT         Hyperlipidemia    Relevant Medications    atorvastatin (LIPITOR) 40 mg tablet    Other Relevant Orders    Ambulatory referral to Diabetic Education    Vitamin D deficiency     Currently on vitamin D3 4000 iu daily            Other Visit Diagnoses     Essential hypertension        Relevant Medications    hydrochlorothiazide (HYDRODIURIL) 25 mg tablet    losartan (COZAAR) 100 MG tablet    Other Relevant Orders    Ambulatory referral to Diabetic Education        CC: Diabetes    History of Present Illness     HPI:  58-year-old female with longstanding history of type 2 diabetes complicated by retinopathy, nephropathy, neuropathy here for follow-up  She was recently switched from basal bolus insulin therapy to U 500 regular insulin-currently on  U500 R 110-0-70-0-  Denies any polyuria , polydipsia , c/o blurry vision - last eye exam   2 episodes of hypoglycemia in the past week- was hypoglycemic this morning 63 ,   Sugars have improved in the past 2 weeks-prior to that was running in 300 to 400s  Checking twice a day - fasting 60-200s   predinner 80-200s doesn't check often  Prednisone 6 mg daily for RA  , needs to f/u with rheumatologist  As having  Hand issues     Review of Systems   Constitutional: Positive for fatigue  Eyes: Positive for visual disturbance  Cardiovascular: Positive for leg swelling  Gastrointestinal: Negative for constipation, diarrhea, nausea and vomiting  Endocrine: Negative for polydipsia and polyuria  Musculoskeletal: Positive for arthralgias and gait problem  Skin: Negative for wound  Psychiatric/Behavioral: Positive for sleep disturbance  All other systems reviewed and are negative        Historical Information   Past Medical History:   Diagnosis Date    Anemia     Arthritis     Diabetes mellitus ([facility] [address] )     Dyspnea on exertion     Hyperlipidemia     Hypertension     Legally blind 2010    Mild intermittent asthma with exacerbation 8/4/2018    Miscarriage     x 3    Seizures (HCC)     Sickle cell trait ([facility] [address] )     Sleep apnea     Thyroid nodule 3/6/2020     Past Surgical History:   Procedure Laterality Date    CATARACT EXTRACTION Bilateral     august and september    EYE SURGERY      HYSTERECTOMY      37    OOPHORECTOMY Left     37    AL TEMPORAL ARTERY LIGATN OR BX Bilateral 10/17/2019    Procedure: BIOPSY ARTERY TEMPORAL; Surgeon: Richie Davison DO;  Location: [facility];  Service: Vascular    US GUIDED THYROID BIOPSY  5/13/2020     Social History   Social History     Substance and Sexual Activity   Alcohol Use Never    Alcohol/week: 0 0 standard drinks    Frequency: Never    Drinks per session: Patient refused    Binge frequency: Never     Social History     Substance and Sexual Activity   Drug Use No     Social History     Tobacco Use   Smoking Status Never Smoker   Smokeless Tobacco Never Used     Family History:   Family History   Problem Relation Age of Onset    Heart attack Mother     Heart disease Mother     Hypertension Mother     No Known Problems Father     Heart disease Sister     No Known Problems Brother     No Known Problems Son     No Known Problems Daughter     Heart attack Maternal Grandmother     Heart disease Maternal Grandmother     Diabetes Other     Heart attack Other     No Known Problems Sister     No Known Problems Sister     No Known Problems Paternal Aunt     No Known Problems Son     Cancer Neg Hx     Stroke Neg Hx        Meds/Allergies   Current Outpatient Medications   Medication Sig Dispense Refill    albuterol (PROVENTIL HFA) 90 mcg/act inhaler Inhale 2 puffs every 6 (six) hours as needed for wheezing For another 24 hours use every 4 hours standing 6 7 g 0    aspirin (ADULT ASPIRIN EC LOW STRENGTH) 81 mg EC tablet Take 81 mg by mouth daily       atorvastatin (LIPITOR) 40 mg tablet Take 1 tablet (40 mg total) by mouth daily 90 tablet 1    SARAH MICROLET LANCETS lancets Inject 1 applicator into the skin 4 (four) times a day      Blood Glucose Monitoring Suppl (Contour Next Monitor) w/Device KIT Disp 1 meter Dx; E11 9 1 kit 1    Blood Pressure Monitor KIT Check blood pressures BID 1 each 0    budesonide-formoterol (SYMBICORT) 80-4 5 MCG/ACT inhaler Inhale 2 puffs 2 (two) times a day Rinse mouth after use   (Patient taking differently: Inhale 2 puffs as needed Rinse mouth after use ) 1 Inhaler 5    Cholecalciferol (HM Vitamin D3) 100 MCG (4000 UT) CAPS 4000 units daily 60 capsule 5    Contour Next Test test strip Test 3x daily 300 each 3    Diclofenac Sodium POWD       diltiazem (CARDIZEM CD) 240 mg 24 hr capsule Take 2 capsules (480 mg total) by mouth daily 180 capsule 1    ferrous sulfate 324 (65 Fe) mg Take 1 tablet (324 mg total) by mouth every other day 45 tablet 3    gabapentin (NEURONTIN) 100 mg capsule Take 1 capsule in am and 2-3 capsules at night 90 capsule 3    hydrochlorothiazide (HYDRODIURIL) 25 mg tablet Take 1 tablet (25 mg total) by mouth daily 90 tablet 1    Insulin Pen Needle (BD PEN NEEDLE DERRICK U/F) 32G X 4 MM MISC Inject 1 applicator under the skin 4 (four) times a day      Insulin Regular Human, Conc, (HumuLIN R U-500 KwikPen) 500 units/mL CONCENTRATED U-500 injection pen 110 units before breakfast, 60 units before dinner 4 pen 3    losartan (COZAAR) 100 MG tablet Take 1 tablet (100 mg total) by mouth daily 90 tablet 1    magnesium oxide (MAG-OX) 400 mg TAKE 1 TABLET BY MOUTH EVERY DAY 30 tablet 0    metFORMIN (GLUCOPHAGE-XR) 500 mg 24 hr tablet Take 2 tablets (1,000 mg total) by mouth 2 (two) times a day with meals 120 tablet 5    predniSONE 1 mg tablet COMBINE WITH THE 5MG TABLETS TO TAKE 7 MG TOTAL DAILY 60 tablet 0    predniSONE 5 mg tablet Combine to take 7 mg daily  30 tablet 1    topiramate (TOPAMAX) 100 mg tablet TAKE 1 TABLET BY MOUTH AT BEDTIME 90 tablet 0    Riboflavin 400 MG TABS Take 1 tablet (400 mg total) by mouth daily 30 tablet 0     No current facility-administered medications for this visit  No Known Allergies    Objective   Vitals: Blood pressure 144/82, pulse 72, temperature 98 °F (36 7 °C), height 5' 8 5" (1 74 m), weight (!) 151 kg (332 lb 6 4 oz), not currently breastfeeding  Physical Exam  Vitals signs reviewed  Constitutional:       Appearance: Normal appearance  She is not diaphoretic     HENT:      Head: Normocephalic and atraumatic  Eyes:      General: No scleral icterus  Extraocular Movements: Extraocular movements intact  Neck:      Musculoskeletal: Neck supple  Cardiovascular:      Rate and Rhythm: Normal rate and regular rhythm  Heart sounds: Normal heart sounds  No murmur  No gallop  Pulmonary:      Effort: No respiratory distress  Breath sounds: Normal breath sounds  No wheezing  Abdominal:      General: There is no distension  Palpations: Abdomen is soft  Tenderness: There is no abdominal tenderness  There is no guarding  Musculoskeletal:      Right lower leg: Edema present  Left lower leg: Edema present  Lymphadenopathy:      Cervical: No cervical adenopathy  Skin:     General: Skin is warm and dry  Neurological:      General: No focal deficit present  Mental Status: She is alert and oriented to person, place, and time  Gait: Gait abnormal    Psychiatric:         Mood and Affect: Mood normal          Behavior: Behavior normal          Thought Content: Thought content normal          Judgment: Judgment normal          The history was obtained from the review of the chart, patient      Lab Results:   Lab Results   Component Value Date/Time    Hemoglobin A1C 12 0 (H) 05/04/2020 09:46 AM    Hemoglobin A1C 9 3 (H) 12/06/2019 02:46 PM    WBC 12 10 (H) 05/04/2020 09:46 AM    WBC 12 55 (H) 03/06/2020 04:43 AM    WBC 10 84 (H) 03/05/2020 10:46 AM    Hemoglobin 10 9 (L) 05/04/2020 09:46 AM    Hemoglobin 10 6 (L) 03/06/2020 04:43 AM    Hemoglobin 10 7 (L) 03/05/2020 10:46 AM    Hematocrit 33 6 (L) 05/04/2020 09:46 AM    Hematocrit 35 8 03/06/2020 04:43 AM    Hematocrit 35 5 03/05/2020 10:46 AM    MCV 78 (L) 05/04/2020 09:46 AM    MCV 79 (L) 03/06/2020 04:43 AM    MCV 78 (L) 03/05/2020 10:46 AM    Platelets 149 23/61/1357 09:46 AM    Platelets 864 67/53/9378 04:43 AM    Platelets 621 51/05/6011 06:58 PM    BUN 19 05/04/2020 09:46 AM    BUN 15 03/06/2020 04:43 AM BUN 14 03/05/2020 10:46 AM    Potassium 3 5 (L) 05/04/2020 09:46 AM    Potassium 3 1 (L) 03/06/2020 04:43 AM    Potassium 3 3 (L) 03/05/2020 10:46 AM    Chloride 102 05/04/2020 09:46 AM    Chloride 103 03/06/2020 04:43 AM    Chloride 100 03/05/2020 10:46 AM    CO2 29 05/04/2020 09:46 AM    CO2 28 03/06/2020 04:43 AM    CO2 32 03/05/2020 10:46 AM    Creatinine 1 04 05/04/2020 09:46 AM    Creatinine 0 94 03/06/2020 04:43 AM    Creatinine 0 98 03/05/2020 10:46 AM    AST 16 05/04/2020 09:46 AM    AST 9 03/06/2020 04:43 AM    AST 8 03/05/2020 06:58 PM    ALT 19 05/04/2020 09:46 AM    ALT 16 03/06/2020 04:43 AM    ALT 15 03/05/2020 06:58 PM    Albumin 3 5 05/04/2020 09:46 AM    Albumin 3 0 (L) 03/06/2020 04:43 AM    Albumin 3 0 (L) 03/05/2020 06:58 PM    HDL, Direct 51 05/04/2020 09:46 AM    Triglycerides 97 05/04/2020 09:46 AM       Final Diagnosis    A-B  Thyroid, Left, lower pole (ThinPrep and smear preparations):  Benign ([place] Category II) - See note  Scattered benign follicular cells, colloid and macrophages  Findings are consistent with a benign follicular nodule with cystic degeneration  Imaging Studies: I have personally reviewed pertinent reports  THYROID ULTRASOUND     INDICATION:    E04 1: Nontoxic single thyroid nodule      COMPARISON:  None     TECHNIQUE:   Ultrasound of the thyroid was performed with a high frequency linear transducer in transverse and sagittal planes including volumetric imaging sweeps as well as traditional still imaging technique      FINDINGS:  Normal homogeneous smooth echotexture      Right lobe:  4 5 x 2 0 x 1 8 cm  Left lobe:  5 1 x 2 1 x 2 3 cm  Isthmus:  0 39 cm      Nodule #1  Image 57  LEFT lower pole bilobed nodule  The 2 contiguous components were measured measuring 1 1 x 1 4 x 1 1 cm and 1 8 x 1 2 x 1 4 cm  COMPOSITION:  2 points, solid or almost completely solid   ECHOGENICITY:  1 point, hyperechoic or isoechoic  SHAPE:  0 points, wider-than-tall  MARGIN: 0 points, ill-defined  ECHOGENIC FOCI:  0 points, none or large comet-tail artifacts  TI-RADS Classification: TR 3 (3 points), Mildly suspicious  FNA if >2 5 cm  Follow if >1 5 cm      Nodule #2  Image 31  RIGHT lower pole nodule measuring 0 4 x 0 3 x 0 3 cm  COMPOSITION:  0 points, cystic or almost completely cystic  ECHOGENICITY:  2 points, hypoechoic  SHAPE:  0 points, wider-than-tall  MARGIN: 0 points, smooth  ECHOGENIC FOCI:  0 points, none or large comet-tail artifacts  TI-RADS Classification: TR 2 (2 points), Not suspious  No FNA      IMPRESSION:     The following meet current ACR criteria for recommending ultrasound guided biopsy:      Left lower pole bilobed nodule (image 57)     Reference: ACR Thyroid Imaging, Reporting and Data System (TI-RADS): White Paper of the [employer]  J AM Nyla Radiol 3516;13:665-670  (additional recommendations based on American Thyroid Association 2015 guidelines )     The study was marked in EPIC for significant notification      Portions of the record may have been created with voice recognition software  Occasional wrong word or "sound a like" substitutions may have occurred due to the inherent limitations of voice recognition software  Read the chart carefully and recognize, using context, where substitutions have occurred

## 2020-09-08 NOTE — ASSESSMENT & PLAN NOTE
Lab Results   Component Value Date    HGBA1C 12 0 (H) 05/04/2020   Continue follow-up with ophthalmologist

## 2020-09-08 NOTE — ASSESSMENT & PLAN NOTE
Lab Results   Component Value Date    HGBA1C 12 0 (H) 05/04/2020   Sugars have improved and he is she has had a couple of hypoglycemic episodes  For now decrease the pre dinner dose of U 500 regular insulin to 60 units  Advised to check blood sugars 3 times a day, before breakfast, before dinner and bedtime and send over log in 2 weeks    Also referred to see the nutritionist so she can be consistent with carb intake at meals

## 2020-09-08 NOTE — ASSESSMENT & PLAN NOTE
Status post fine-needle aspiration biopsy earlier this year which was negative for malignancy    Repeat thyroid ultrasound in the next 6 months to monitor for any changes in the size or characteristics of the thyroid nodules

## 2020-09-14 ENCOUNTER — TELEPHONE (OUTPATIENT)
Dept: NEUROLOGY | Facility: CLINIC | Age: 60
End: 2020-09-14

## 2020-09-14 ENCOUNTER — PATIENT OUTREACH (OUTPATIENT)
Dept: FAMILY MEDICINE CLINIC | Facility: CLINIC | Age: 60
End: 2020-09-14

## 2020-09-14 NOTE — TELEPHONE ENCOUNTER
Confirmed 9/15 9:30AM [address] at [facility]  Registration completed  Covid-screening questions completed - cough due to asthma  MSPQ completed  Aware of all policies - mask, visitor, temperature and no show fee

## 2020-09-14 NOTE — PROGRESS NOTES
Called patient to remind her of her Cards and Neuro appointments tomorrow  Patient was aware and will be attending  Patient notes she recently saw Endo and realizes she needs to pay more attention to her diet and increase exercise  She reports her blood sugars in the mornings run about 150 and a few hours later increase to 180-200 without having eaten breakfast since she states she is still on her CPAP  Patient notes she takes 110U insulin in the morning and 60U at nighttime  Patient states she ran out of her metformin but called Endo and the prescription refill was made 9/8; I asked that she contact the pharmacy  I will continue to follow

## 2020-09-15 ENCOUNTER — OFFICE VISIT (OUTPATIENT)
Dept: NEUROLOGY | Facility: CLINIC | Age: 60
End: 2020-09-15
Payer: MEDICARE

## 2020-09-15 ENCOUNTER — OFFICE VISIT (OUTPATIENT)
Dept: CARDIOLOGY CLINIC | Facility: CLINIC | Age: 60
End: 2020-09-15
Payer: MEDICARE

## 2020-09-15 ENCOUNTER — TELEPHONE (OUTPATIENT)
Dept: NEUROLOGY | Facility: CLINIC | Age: 60
End: 2020-09-15

## 2020-09-15 VITALS
HEART RATE: 88 BPM | SYSTOLIC BLOOD PRESSURE: 146 MMHG | DIASTOLIC BLOOD PRESSURE: 84 MMHG | HEIGHT: 69 IN | TEMPERATURE: 97.7 F | RESPIRATION RATE: 18 BRPM | BODY MASS INDEX: 43.4 KG/M2 | WEIGHT: 293 LBS

## 2020-09-15 VITALS
HEIGHT: 69 IN | DIASTOLIC BLOOD PRESSURE: 84 MMHG | WEIGHT: 293 LBS | SYSTOLIC BLOOD PRESSURE: 176 MMHG | TEMPERATURE: 97.2 F | HEART RATE: 89 BPM | BODY MASS INDEX: 43.4 KG/M2

## 2020-09-15 DIAGNOSIS — H54.3 VISUAL IMPAIRMENT IN BOTH EYES: ICD-10-CM

## 2020-09-15 DIAGNOSIS — I51.7 LVH (LEFT VENTRICULAR HYPERTROPHY): ICD-10-CM

## 2020-09-15 DIAGNOSIS — G43.709 CHRONIC MIGRAINE WITHOUT AURA WITHOUT STATUS MIGRAINOSUS, NOT INTRACTABLE: Primary | ICD-10-CM

## 2020-09-15 DIAGNOSIS — E78.5 HYPERLIPIDEMIA, UNSPECIFIED HYPERLIPIDEMIA TYPE: ICD-10-CM

## 2020-09-15 DIAGNOSIS — IMO0002 DIABETES MELLITUS TYPE 2 WITH COMPLICATIONS, UNCONTROLLED: ICD-10-CM

## 2020-09-15 DIAGNOSIS — I10 BENIGN ESSENTIAL HTN: Primary | ICD-10-CM

## 2020-09-15 DIAGNOSIS — I10 ESSENTIAL HYPERTENSION: ICD-10-CM

## 2020-09-15 DIAGNOSIS — M35.3 PMR (POLYMYALGIA RHEUMATICA) (HCC): ICD-10-CM

## 2020-09-15 DIAGNOSIS — G47.33 OBSTRUCTIVE SLEEP APNEA: ICD-10-CM

## 2020-09-15 DIAGNOSIS — Z86.69 HISTORY OF ABSENCE SEIZURES: ICD-10-CM

## 2020-09-15 DIAGNOSIS — I67.1 LEFT CAVERNOUS CAROTID ANEURYSM: ICD-10-CM

## 2020-09-15 PROCEDURE — 99214 OFFICE O/P EST MOD 30 MIN: CPT | Performed by: INTERNAL MEDICINE

## 2020-09-15 PROCEDURE — 99215 OFFICE O/P EST HI 40 MIN: CPT | Performed by: PHYSICIAN ASSISTANT

## 2020-09-15 RX ORDER — TOPIRAMATE 100 MG/1
TABLET, FILM COATED ORAL
Qty: 90 TABLET | Refills: 0 | Status: SHIPPED | OUTPATIENT
Start: 2020-09-15 | End: 2021-03-16 | Stop reason: SDUPTHER

## 2020-09-15 NOTE — PROGRESS NOTES
Patient ID: Gumaro Johnson is a 62 y o  female  Assessment/Plan:    Chronic migraine without aura without status migrainosus, not intractable  These are significantly reduced after starting Topamax  She denies side effects  I will need to clarify if there is glaucoma in her history  She recently saw the ophthalmologist and I will request the last report  The patient denies that she has glaucoma; She definitely has poor vision secondary to diabetic retinopathy  Continues to endocrinology for management  Continue transdermal therapeutics compound cream prn neck pain and/or headache  Obstructive sleep apnea  Recently dx with mild to moderate MOHAN per home sleep study, and CPAP was initiated  The patient NS for her last sleep medicine appt  Rec she f/u with them as indicated  Cerebral aneurysm without rupture  Per last CTA in March:  -- "Inferolateral projecting blisterlike 4 mm aneurysm arising from distal cavernous segment of left ICA  -- inferolaterally projecting 4 mm aneurysm arising from the junction of cavernous and supraclinoid left ICA are stable from CTA 9/20/2019 "    Will follow with nsx as scheduled  BP has been elevated on and off  Today 176/84, however typically runs much lower in approx 140s/80s  Non smoker  PMR (polymyalgia rheumatica) ([facility])  Will continue with rheumatology for management  We discussed prednisone reducing the ability for her to better manage her glucose  Last a1c 12%  She would like to have further discussion with Dr Inge Slade if possible about other options         Diagnoses and all orders for this visit:    Chronic migraine without aura without status migrainosus, not intractable  -     topiramate (TOPAMAX) 100 mg tablet; TAKE 1 TABLET BY MOUTH AT BEDTIME    Left cavernous carotid aneurysm    Obstructive sleep apnea    History of absence seizures    PMR (polymyalgia rheumatica) ([facility])    Essential hypertension    Visual impairment in both eyes    Diabetes mellitus type 2 with complications, uncontrolled ([facility] )    Hyperlipidemia, unspecified hyperlipidemia type       The patient should not hesitate to call me prior to her follow up with any questions or concerns  The patient was instructed to urgently call 911 or present to the nearest emergency room with any new or worsening neurological deficits      I have spent >40 minutes with Patient  today in which greater than 50% of this time was spent in counseling/coordination of care regarding Impressions, plan and med side effects  Subjective:    HPI     Ms Santa Welsh is a 61 yo female here for neurological follow up  She is now on LTD after losing her job, which mainly is due to decreased visual acuity  She is happy with no job because she thinks this will improve her lifestyle  She plans to "watch more movies" and get back into art (charcoal) which is a hobby for her  Also states she is trying to move out of [facility], but she does not know if this will happen  Recently had a ophthalmology f/u with Dr Yulisa Zaldivar- at [address] [facility] [id number] for [facility] specialist- she reports she was diagnosed as "legally blind" with diabetic retinopathy  She DOES NOT have glaucoma from her history  Last time she saw him was Feb or March  Scheduled for visit this month  We still need to obtain the last report  Prior HPI:  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, but has been on disability due to her symptoms, mainly of memory loss   She states that she loses track of her memory when she is on the phone with the client, and sometimes stares into space or dozes off while on the phone and the client will ask her if she is still there      She is legally blind since 2010 due to diabetes      Since last seen migraine headaches significantly reduced with Topamax  She denies side effects to Topamax  The patient tells me that she is unsure if she was recently diagnosed glaucoma    I reviewed this as a potential complication of Topamax and I told her that if she was diagnosed with glaucoma that she should not be taking Topamax      She also uses the compound cream for neck pain headaches from transdermal therapeutics  She was seen by Dr Rick Lomas last time for headaches, episodic dizziness and imbalance starting a year ago, and ilda Joya has not been working since May 2019 due to the headaches which are disabling, and daily  Fatigue/daytime drowsiness:  She has significant fatigue   States she loses track of time, falls asleep easily or dozes off   Does not feel well rested upon awakening      She stopped driving due to grogginess and also blindness      Tries to stay awake but gets very groggy and agitated the longer she stays up  Sleep comes on sometimes without her knowing it  She can fall asleep sittin gup  She will wake up and wonder where she is or how long she was out     She fell asleep on the phone with a customer- was not responding and customer mentioned why she didn't want to talk to her      Headaches described as:  Location:  Right temporal with secondary holocephalic headache, with bl conjunctival injection  -- No clear trigger or inciting etiology 1 year ago when they started  -- States at times sharp stabbing periorbital headaches with worse vision than normal   -- Feels like someone is hitting her in the head, +photophobia, no phonophobia, no nausea or vomiting      Headaches started in December dec 2018 approximately    - almost every day with headache in the evening  - States like hitting her head on a rock when she hits pillow  - Left eye sharp pain, tender side of head  - Cant see in the AM sometimes from left or right eye  - sometimes has red eyes, sensitivity to light bilaterally, denies sound sensitivity       Seizure history:  Had seizures "as a baby "  She remembers her peers and teacher would ask why she is staring into space      Denies head or neck trauma, spine injury infection, recent illness fevers kofi  Denies family hx cerebral aneurysm   Personal history of +PMR diagnosed 9/2019 and long standing DM      Aneurysms:  Was sent to the ED after the last visit 2/10/2020 for elevated blood pressure in the setting of aneurysms   She has 4 mm aneurysm arising from distal cavernous segment of left ICA, as well as inferolaterally projecting 4 mm aneurysm arising from junction of the cavernous and supraclinoid left ICA, stable since CTA 9/20/2019   Her BP in the ED that day was 239/116   No immediate neurosurgical intervention is not planned at this time   She currently takes diltiazem 480 mg daily, HCTZ 25 mg daily and 100 mg losartan daily   Blood pressure is better controlled as noted today  The following portions of the patient's history were reviewed and updated as appropriate:   She  has a past medical history of Anemia, Arthritis, Diabetes mellitus ([facility] [address] ), Dyspnea on exertion, Hyperlipidemia, Hypertension, Legally blind (2010), Mild intermittent asthma with exacerbation (8/4/2018), Miscarriage, Seizures ([facility] ), Sickle cell trait ([facility] ), Sleep apnea, and Thyroid nodule (3/6/2020)    She   Patient Active Problem List    Diagnosis Date Noted    Essential hypertension 09/15/2020    Diabetes mellitus ([facility] [address] ) 09/08/2020    Chronic migraine without aura without status migrainosus, not intractable 04/22/2020    Hypersomnia 04/06/2020    Migraine without aura and without status migrainosus, not intractable 04/06/2020    Cerebral aneurysm without rupture 04/06/2020    History of absence seizures 03/25/2020    Thyroid nodule 03/06/2020    Aneurysm ([facility] ) 03/05/2020    Type 2 diabetes mellitus with hyperglycemia, with long-term current use of insulin ([facility] ) 02/21/2020    Left cavernous carotid aneurysm 02/10/2020    Polyneuropathy associated with underlying disease ([facility] ) 01/07/2020    PMR (polymyalgia rheumatica) ([facility] ) 01/07/2020    Thrombocytosis ([facility] ) 01/07/2020    Mor[facility] 09/19/2019    Other inflammatory and immune myopathies, not elsewhere classified 09/16/2019    Transaminitis 09/16/2019    Frequent headaches 07/09/2019    Type 2 diabetes mellitus with microalbuminuria, with long-term current use of insulin ([email or web address] ) 02/01/2019    Mild intermittent asthma with exacerbation 08/04/2018    Orthostatic hypotension 06/25/2018    Lung nodules 03/22/2018    Exertional dyspnea 02/13/2018    Enlarged [facility] ([facility] ) 02/13/2018    Aortic dilatation ([facility] ) 02/13/2018    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, with long-term current use of insulin ([facility])     Benign hypertension     Seizures ([email or web address] )     Obstructive sleep apnea 12/18/2017    Prolonged QT interval 12/18/2017    Decreased pulses in feet 12/11/2017    Diabetes mellitus type 2 with complications, uncontrolled ([email or web address] ) 09/08/2015    Microalbuminuria 09/08/2015    Vitamin D deficiency 06/06/2014    Visual impairment in both eyes 12/06/2012    Anemia 03/20/2012    Hyperlipidemia 03/20/2012    Class 3 severe obesity with serious comorbidity and body mass index (BMI) of 45 0 to 49 9 in adult [facility]) 03/20/2012    Peripheral neuropathy 03/20/2012     She  has a past surgical history that includes Cataract extraction (Bilateral); Eye surgery; Hysterectomy; Oophorectomy (Left); pr temporal artery ligatn or bx (Bilateral, 10/17/2019); and US guided thyroid biopsy (5/13/2020)  Her family history includes Diabetes in her other; Heart attack in her maternal grandmother, mother, and other; Heart disease in her maternal grandmother, mother, and sister; Hypertension in her mother; No Known Problems in her brother, daughter, father, paternal aunt, sister, sister, son, and son  She  reports that she has never smoked  She has never used smokeless tobacco  She reports that she does not drink alcohol or use drugs    Current Outpatient Medications   Medication Sig Dispense Refill    albuterol (PROVENTIL HFA) 90 mcg/act inhaler Inhale 2 puffs every 6 (six) hours as needed for wheezing For another 24 hours use every 4 hours standing 6 7 g 0    aspirin (ADULT ASPIRIN EC LOW STRENGTH) 81 mg EC tablet Take 81 mg by mouth daily       atorvastatin (LIPITOR) 40 mg tablet Take 1 tablet (40 mg total) by mouth daily 90 tablet 1    SARAH MICROLET LANCETS lancets Inject 1 applicator into the skin 4 (four) times a day      Blood Glucose Monitoring Suppl (Contour Next Monitor) w/Device KIT Disp 1 meter Dx; E11 9 1 kit 1    Blood Pressure Monitor KIT Check blood pressures BID 1 each 0    budesonide-formoterol (SYMBICORT) 80-4 5 MCG/ACT inhaler Inhale 2 puffs 2 (two) times a day Rinse mouth after use   (Patient taking differently: Inhale 2 puffs as needed Rinse mouth after use ) 1 Inhaler 5    Cholecalciferol (HM Vitamin D3) 100 MCG (4000 UT) CAPS 4000 units daily 60 capsule 5    Contour Next Test test strip Test 3x daily 300 each 3    Diclofenac Sodium POWD       diltiazem (CARDIZEM CD) 240 mg 24 hr capsule Take 2 capsules (480 mg total) by mouth daily 180 capsule 1    ferrous sulfate 324 (65 Fe) mg Take 1 tablet (324 mg total) by mouth every other day 45 tablet 3    gabapentin (NEURONTIN) 100 mg capsule Take 1 capsule in am and 2-3 capsules at night 90 capsule 3    hydrochlorothiazide (HYDRODIURIL) 25 mg tablet Take 1 tablet (25 mg total) by mouth daily 90 tablet 1    Insulin Pen Needle (BD PEN NEEDLE DERRICK U/F) 32G X 4 MM MISC Inject 1 applicator under the skin 4 (four) times a day      Insulin Regular Human, Conc, (HumuLIN R U-500 KwikPen) 500 units/mL CONCENTRATED U-500 injection pen 110 units before breakfast, 60 units before dinner 4 pen 3    losartan (COZAAR) 100 MG tablet Take 1 tablet (100 mg total) by mouth daily 90 tablet 1    magnesium oxide (MAG-OX) 400 mg TAKE 1 TABLET BY MOUTH EVERY DAY 30 tablet 0    metFORMIN (GLUCOPHAGE-XR) 500 mg 24 hr tablet Take 2 tablets (1,000 mg total) by mouth 2 (two) times a day with meals 120 tablet 5    predniSONE 1 mg tablet COMBINE WITH THE 5MG TABLETS TO TAKE 7 MG TOTAL DAILY (Patient taking differently: COMBINE WITH THE 5MG TABLETS TO TAKE 6 MG TOTAL DAILY) 60 tablet 0    predniSONE 5 mg tablet Combine to take 7 mg daily  (Patient taking differently: Combine to take 6 mg daily ) 30 tablet 1    topiramate (TOPAMAX) 100 mg tablet TAKE 1 TABLET BY MOUTH AT BEDTIME 90 tablet 0    Riboflavin 400 MG TABS Take 1 tablet (400 mg total) by mouth daily 30 tablet 0     No current facility-administered medications for this visit  She has No Known Allergies            Objective:    Blood pressure (!) 176/84, pulse 89, temperature (!) 97 2 °F (36 2 °C), height 5' 8 5" (1 74 m), weight (!) 149 kg (328 lb), not currently breastfeeding  Body mass index is 49 15 kg/m²  Physical Exam    Neurological Exam  On neurologic exam, the patient is alert and oriented to time and place  Speech is fluent and articulate, and the patient follows commands appropriately  Judgment and affect appear normal  CN2-12 intact and symmetric, except for cloudy fundus exam b/l and slow reaction to light from the pupils  Pupils constrict consensually  Face is symmetric, and tongue, uvula, and palate are midline  Facial sensation is normal and symmetric, in all 3 divisions of the trigeminal nerve  Hearing is intact  Motor examination reveals intact strength throughout  WB gait with a cane  She arises on her own but needs to push off of the chair with her hands  ROS:    Review of Systems   Constitutional: Negative  Negative for appetite change and fever  HENT: Negative  Negative for hearing loss, tinnitus, trouble swallowing and voice change  Eyes: Negative  Negative for photophobia and pain  Respiratory: Negative  Negative for shortness of breath  Cardiovascular: Negative  Negative for palpitations  Gastrointestinal: Negative  Negative for nausea and vomiting  Endocrine: Negative    Negative for cold intolerance  Genitourinary: Negative  Negative for dysuria, frequency and urgency  Musculoskeletal: Negative  Negative for myalgias and neck pain  Skin: Negative  Negative for rash  Neurological: Negative  Negative for dizziness, tremors, seizures, syncope, facial asymmetry, speech difficulty, weakness, light-headedness, numbness and headaches  Hematological: Negative  Does not bruise/bleed easily  Psychiatric/Behavioral: Negative  Negative for confusion, hallucinations and sleep disturbance  The following portions of the patient's history were reviewed and updated as appropriate: allergies, current medications/ medication history, past family history, past medical history, past social history, past surgical history and problem list     Review of systems was reviewed and otherwise unremarkable from a neurological perspective

## 2020-09-15 NOTE — PROGRESS NOTES
Cardiology   MD Oneida Samson MD Ather Mansoor, MD Vallarie Ernst DO, Daphne Deras DO, [facility] - [place]  -------------------------------------------------------------------  [facility]  [address] [place] [id number]  [address]                   1960                   [id number]          Assessment/Plan:    1  Hypertension  2  Stable ascending thoracic aortic aneurysm, 4 2 cm  3  Hypertensive heart disease with mild-to-moderate concentric hypertrophy  4  Dyslipidemia  5  Diabetes  6  Polymyalgia rheumatica, chronic steroid use  7  Cerebral artery aneurysms  8  Chronic/stable exertional dyspnea with normal coronary angiography 01/2019      · Blood pressure mildly elevated today's visit  Patient given a blank blood pressure log to fill out on a daily basis  She was requested call me if her systolic blood pressure remains greater than one hundred forty mm Hg and a regular basis  For now, continue diltiazem, losartan, HCTZ  · Mild to moderate lower extremity edema on examination which patient states has been stable  Continue hydrochlorothiazide  May need furosemide in the future if increased lower extremity edema  Prior NT proBNP 03/05/2020 normal at 38 pg/mL  · Continue atorvastatin for dyslipidemia      Follow-up in 6 months      Interval History:     Malachi Laureano is a 62 y o  female  with past medical history of diabetes since 2003, normal coronary arteries from 1/2019, microalbuminemia, neuropathy, hypertension with left ventricular hypertrophy, family history of cardiovascular disease in her mother who had sudden cardiac death at 66years old, aneurysmal ascending aorta measuring up to 4 3 cm, prominent main pulmonary artery at 3 5 cm, recently diagnosed reportedly mild sleep apnea, asthma, migraines, remote history of seizures    She has been diagnosed with cerebral aneurysm and PMR as well      Prior CT chest 09/2019 4 2 cm ascending thoracic aortic aneurysm, stable and 3 7 cm main pulmonary artery is ectasia  Prior CT head 03/06/2020 revealed 4 mm aneurysm arising from distal cavernous segment of left ICA and inferolaterally projects 4 mm aneurysm arising from the junction of cavernous and supraclinoid left ICA, stable  Echocardiogram 09/2019 revealed ejection fraction 60% with mild-to-moderate concentric hypertrophy  She presents today for follow-up with no cardiovascular complaints  She has chronic but stable exertional dyspnea which has not changed  She has chronic but stable lower extremity edema which has not changed either  She was able to take a walk outdoors today without any issues  She does ambulate with a cane  She states her home blood pressure readings are typically well controlled, although occasionally she will lower her diltiazem dose to 1 capsule daily if her blood pressure is marginally low  Vitals: There were no vitals filed for this visit        Past Medical History:   Diagnosis Date    Anemia     Arthritis     [facility] mellitus [facility] [address] [facility]     Dyspnea on exertion     Hyperlipidemia     Hypertension     Legally blind 2010    Mild intermittent asthma with exacerbation 8/4/2018    Miscarriage     x 3    Seizures (HCC)     Sickle cell trait ([facility] )     Sleep apnea     Thyroid nodule 3/6/2020     Social History     Socioeconomic History    Marital status: /Civil Union     Spouse name: Not on file    Number of children: Not on file    Years of education: Not on file    Highest education level: Not on file   Occupational History    Occupation: long term disability   Social Needs    Financial resource strain: Not very hard    Food insecurity     Worry: Sometimes true     Inability: Sometimes true    Transportation needs     Medical: No     Non-medical: No   Tobacco Use    Smoking status: Never Smoker    Smokeless tobacco: Never Used   Substance and Sexual Activity    Alcohol use: Never     Alcohol/week: 0 0 standard drinks     Frequency: Never     Drinks per session: Patient refused     Binge frequency: Never    Drug use: No    Sexual activity: Not Currently     Partners: Male     Birth control/protection: None   Lifestyle    Physical activity     Days per week: 0 days     Minutes per session: 0 min    Stress:  Only a little   Relationships    Social connections     Talks on phone: Twice a week     Gets together: Twice a week     Attends Christianity service: Not on file     Active member of club or organization: Not on file     Attends meetings of clubs or organizations: Not on file     Relationship status:     Intimate partner violence     Fear of current or ex partner: Not on file     Emotionally abused: Not on file     Physically abused: Not on file     Forced sexual activity: Not on file   Other Topics Concern    Not on file   Social History Narrative    Caffeine use    Resides with spouse and one son      Family History   Problem Relation Age of Onset    Heart attack Mother     Heart disease Mother     Hypertension Mother     No Known Problems Father     Heart disease Sister     No Known Problems Brother     No Known Problems Son     No Known Problems Daughter     Heart attack Maternal Grandmother     Heart disease Maternal Grandmother     Diabetes Other     Heart attack Other     No Known Problems Sister     No Known Problems Sister     No Known Problems Paternal Aunt     No Known Problems Son     Cancer Neg Hx     Stroke Neg Hx      Past Surgical History:   Procedure Laterality Date    CATARACT EXTRACTION Bilateral     august and september    EYE SURGERY      HYSTERECTOMY      42    OOPHORECTOMY Left     37    NH TEMPORAL ARTERY LIGATN OR BX Bilateral 10/17/2019    Procedure: BIOPSY ARTERY TEMPORAL;  Surgeon: Tawana Gordon DO;  Location: [facility];  Service: Vascular    US GUIDED THYROID BIOPSY  5/13/2020       Current Outpatient Medications:     albuterol (PROVENTIL HFA) 90 mcg/act inhaler, Inhale 2 puffs every 6 (six) hours as needed for wheezing For another 24 hours use every 4 hours standing, Disp: 6 7 g, Rfl: 0    aspirin (ADULT ASPIRIN EC LOW STRENGTH) 81 mg EC tablet, Take 81 mg by mouth daily , Disp: , Rfl:     atorvastatin (LIPITOR) 40 mg tablet, Take 1 tablet (40 mg total) by mouth daily, Disp: 90 tablet, Rfl: 1    [employer] MICROLET LANCETS lancets, Inject 1 applicator into the skin 4 (four) times a day, Disp: , Rfl:     Blood Glucose Monitoring Suppl (Contour Next Monitor) w/Device KIT, Disp 1 meter Dx; E11 9, Disp: 1 kit, Rfl: 1    Blood Pressure Monitor KIT, Check blood pressures BID, Disp: 1 each, Rfl: 0    budesonide-formoterol (SYMBICORT) 80-4 5 MCG/ACT inhaler, Inhale 2 puffs 2 (two) times a day Rinse mouth after use   (Patient taking differently: Inhale 2 puffs as needed Rinse mouth after use ), Disp: 1 Inhaler, Rfl: 5    Cholecalciferol (HM Vitamin D3) 100 MCG (4000 UT) CAPS, 4000 units daily, Disp: 60 capsule, Rfl: 5    Contour Next Test test strip, Test 3x daily, Disp: 300 each, Rfl: 3    Diclofenac Sodium POWD, , Disp: , Rfl:     diltiazem (CARDIZEM CD) 240 mg 24 hr capsule, Take 2 capsules (480 mg total) by mouth daily, Disp: 180 capsule, Rfl: 1    ferrous sulfate 324 (65 Fe) mg, Take 1 tablet (324 mg total) by mouth every other day, Disp: 45 tablet, Rfl: 3    gabapentin (NEURONTIN) 100 mg capsule, Take 1 capsule in am and 2-3 capsules at night, Disp: 90 capsule, Rfl: 3    hydrochlorothiazide (HYDRODIURIL) 25 mg tablet, Take 1 tablet (25 mg total) by mouth daily, Disp: 90 tablet, Rfl: 1    Insulin Pen Needle (BD PEN NEEDLE DERRICK U/F) 32G X 4 MM MISC, Inject 1 applicator under the skin 4 (four) times a day, Disp: , Rfl:     Insulin Regular Human, Conc, (HumuLIN R U-500 KwikPen) 500 units/mL CONCENTRATED U-500 injection pen, 110 units before breakfast, 60 units before dinner, Disp: 4 pen, Rfl: 3    losartan (COZAAR) 100 MG tablet, Take 1 tablet (100 mg total) by mouth daily, Disp: 90 tablet, Rfl: 1    magnesium oxide (MAG-OX) 400 mg, TAKE 1 TABLET BY MOUTH EVERY DAY, Disp: 30 tablet, Rfl: 0    metFORMIN (GLUCOPHAGE-XR) 500 mg 24 hr tablet, Take 2 tablets (1,000 mg total) by mouth 2 (two) times a day with meals, Disp: 120 tablet, Rfl: 5    predniSONE 1 mg tablet, COMBINE WITH THE 5MG TABLETS TO TAKE 7 MG TOTAL DAILY (Patient taking differently: COMBINE WITH THE 5MG TABLETS TO TAKE 6 MG TOTAL DAILY), Disp: 60 tablet, Rfl: 0    predniSONE 5 mg tablet, Combine to take 7 mg daily  (Patient taking differently: Combine to take 6 mg daily ), Disp: 30 tablet, Rfl: 1    Riboflavin 400 MG TABS, Take 1 tablet (400 mg total) by mouth daily, Disp: 30 tablet, Rfl: 0    topiramate (TOPAMAX) 100 mg tablet, TAKE 1 TABLET BY MOUTH AT BEDTIME, Disp: 90 tablet, Rfl: 0        Review of Systems:  Review of Systems   Constitutional: Negative for activity change, fever and unexpected weight change  HENT: Negative for facial swelling, nosebleeds and voice change  Respiratory: Negative for chest tightness, shortness of breath and wheezing  Cardiovascular: Positive for leg swelling  Negative for chest pain and palpitations  Gastrointestinal: Negative for abdominal distention  Genitourinary: Negative for hematuria  Musculoskeletal: Negative for arthralgias  Skin: Negative for color change, pallor, rash and wound  Neurological: Negative for dizziness, seizures and syncope  Psychiatric/Behavioral: Negative for agitation  Physical Exam:  Physical Exam  Vitals signs reviewed  Constitutional:       Appearance: She is well-developed  HENT:      Head: Normocephalic and atraumatic  Neck:      Musculoskeletal: Normal range of motion and neck supple  Cardiovascular:      Rate and Rhythm: Normal rate and regular rhythm  Heart sounds: Normal heart sounds     Pulmonary: Effort: Pulmonary effort is normal       Breath sounds: Normal breath sounds  Abdominal:      Palpations: Abdomen is soft  Musculoskeletal: Normal range of motion  Right lower leg: Edema present  Left lower leg: Edema present  Skin:     General: Skin is warm and dry  Neurological:      Mental Status: She is alert and oriented to person, place, and time  Psychiatric:         Behavior: Behavior normal          Thought Content: Thought content normal          Judgment: Judgment normal          This note was completed in part utilizing [employer]  Grammatical errors, random word insertions, spelling mistakes, and incomplete sentences can be an occasional consequence of this system secondary to software limitations, ambient noise, and hardware issues  If you have any questions or concerns about the content, text, or information contained within the body of this dictation, please contact the provider for clarification

## 2020-09-15 NOTE — TELEPHONE ENCOUNTER
[facility] and spoke to Abby  She will be faxing the records to our [facility] fax number [phone]  observation and assessment

## 2020-09-15 NOTE — TELEPHONE ENCOUNTER
----- Message from Lila Oliva PA-C sent at 9/15/2020 10:15 AM EDT -----  Can you get recent records from Dr Woo Johnson- [facility] think [facility]  Thanks!

## 2020-09-17 LAB
LEFT EYE DIABETIC RETINOPATHY: NORMAL
RIGHT EYE DIABETIC RETINOPATHY: NORMAL

## 2020-09-17 NOTE — ASSESSMENT & PLAN NOTE
Will continue with rheumatology for management  We discussed prednisone reducing the ability for her to better manage her glucose  Last a1c 12%  She would like to have further discussion with Dr Leticia Napier if possible about other options

## 2020-09-17 NOTE — ASSESSMENT & PLAN NOTE
These are significantly reduced after starting Topamax  She denies side effects  I will need to clarify if there is glaucoma in her history  She recently saw the ophthalmologist and I will request the last report  The patient denies that she has glaucoma; She definitely has poor vision secondary to diabetic retinopathy  Continues to endocrinology for management  Continue transdermal therapeutics compound cream prn neck pain and/or headache

## 2020-09-17 NOTE — ASSESSMENT & PLAN NOTE
Per last CTA in March:  -- "Inferolateral projecting blisterlike 4 mm aneurysm arising from distal cavernous segment of left ICA  -- inferolaterally projecting 4 mm aneurysm arising from the junction of cavernous and supraclinoid left ICA are stable from CTA 9/20/2019 "    Will follow with nsx as scheduled  BP has been elevated on and off  Today 176/84, however typically runs much lower in approx 140s/80s  Non smoker

## 2020-09-23 ENCOUNTER — LAB (OUTPATIENT)
Dept: LAB | Facility: CLINIC | Age: 60
End: 2020-09-23
Payer: MEDICARE

## 2020-09-23 ENCOUNTER — OFFICE VISIT (OUTPATIENT)
Dept: RHEUMATOLOGY | Facility: CLINIC | Age: 60
End: 2020-09-23
Payer: MEDICARE

## 2020-09-23 VITALS — SYSTOLIC BLOOD PRESSURE: 132 MMHG | HEART RATE: 92 BPM | TEMPERATURE: 99.8 F | DIASTOLIC BLOOD PRESSURE: 84 MMHG

## 2020-09-23 DIAGNOSIS — M35.3 POLYMYALGIA RHEUMATICA (HCC): ICD-10-CM

## 2020-09-23 DIAGNOSIS — Z79.52 CURRENT CHRONIC USE OF SYSTEMIC STEROIDS: ICD-10-CM

## 2020-09-23 DIAGNOSIS — E11.9 INSULIN DEPENDENT TYPE 2 DIABETES MELLITUS (HCC): ICD-10-CM

## 2020-09-23 DIAGNOSIS — E66.01 CLASS 3 SEVERE OBESITY WITHOUT SERIOUS COMORBIDITY WITH BODY MASS INDEX (BMI) OF 45.0 TO 49.9 IN ADULT, UNSPECIFIED OBESITY TYPE (HCC): ICD-10-CM

## 2020-09-23 DIAGNOSIS — G56.91 NEUROPATHY OF HAND, RIGHT: ICD-10-CM

## 2020-09-23 DIAGNOSIS — M35.3 PMR (POLYMYALGIA RHEUMATICA) (HCC): ICD-10-CM

## 2020-09-23 DIAGNOSIS — Z79.4 INSULIN DEPENDENT TYPE 2 DIABETES MELLITUS (HCC): ICD-10-CM

## 2020-09-23 DIAGNOSIS — M35.3 POLYMYALGIA RHEUMATICA (HCC): Primary | ICD-10-CM

## 2020-09-23 LAB
BASOPHILS # BLD AUTO: 0.05 THOUSANDS/ΜL (ref 0–0.1)
BASOPHILS NFR BLD AUTO: 1 % (ref 0–1)
CRP SERPL QL: 28.4 MG/L
EOSINOPHIL # BLD AUTO: 0.27 THOUSAND/ΜL (ref 0–0.61)
EOSINOPHIL NFR BLD AUTO: 3 % (ref 0–6)
ERYTHROCYTE [DISTWIDTH] IN BLOOD BY AUTOMATED COUNT: 16.4 % (ref 11.6–15.1)
ERYTHROCYTE [SEDIMENTATION RATE] IN BLOOD: 95 MM/HOUR (ref 0–29)
HCT VFR BLD AUTO: 38.1 % (ref 34.8–46.1)
HGB BLD-MCNC: 11.7 G/DL (ref 11.5–15.4)
IMM GRANULOCYTES # BLD AUTO: 0.05 THOUSAND/UL (ref 0–0.2)
IMM GRANULOCYTES NFR BLD AUTO: 1 % (ref 0–2)
LYMPHOCYTES # BLD AUTO: 3.01 THOUSANDS/ΜL (ref 0.6–4.47)
LYMPHOCYTES NFR BLD AUTO: 28 % (ref 14–44)
MCH RBC QN AUTO: 24.6 PG (ref 26.8–34.3)
MCHC RBC AUTO-ENTMCNC: 30.7 G/DL (ref 31.4–37.4)
MCV RBC AUTO: 80 FL (ref 82–98)
MONOCYTES # BLD AUTO: 0.64 THOUSAND/ΜL (ref 0.17–1.22)
MONOCYTES NFR BLD AUTO: 6 % (ref 4–12)
NEUTROPHILS # BLD AUTO: 6.9 THOUSANDS/ΜL (ref 1.85–7.62)
NEUTS SEG NFR BLD AUTO: 61 % (ref 43–75)
NRBC BLD AUTO-RTO: 0 /100 WBCS
PLATELET # BLD AUTO: 356 THOUSANDS/UL (ref 149–390)
PMV BLD AUTO: 10.6 FL (ref 8.9–12.7)
RBC # BLD AUTO: 4.76 MILLION/UL (ref 3.81–5.12)
WBC # BLD AUTO: 10.92 THOUSAND/UL (ref 4.31–10.16)

## 2020-09-23 PROCEDURE — 86140 C-REACTIVE PROTEIN: CPT

## 2020-09-23 PROCEDURE — 85025 COMPLETE CBC W/AUTO DIFF WBC: CPT

## 2020-09-23 PROCEDURE — 36415 COLL VENOUS BLD VENIPUNCTURE: CPT

## 2020-09-23 PROCEDURE — 85652 RBC SED RATE AUTOMATED: CPT

## 2020-09-23 PROCEDURE — 99214 OFFICE O/P EST MOD 30 MIN: CPT | Performed by: INTERNAL MEDICINE

## 2020-09-23 RX ORDER — PREDNISONE 1 MG/1
5 TABLET ORAL DAILY
Qty: 90 TABLET | Refills: 0 | Status: SHIPPED | OUTPATIENT
Start: 2020-09-23 | End: 2020-12-11 | Stop reason: SDUPTHER

## 2020-09-23 NOTE — PROGRESS NOTES
Assessment and Plan:   Ms Ara Bhat a 20-year-old  female with history significant for polymyalgia rheumatica, insulin-dependent diabetes mellitus, with complications related to retinopathy causing legal blindness bilaterally, asthma, osteoarthritis, microcytic anemia of unclear etiology and morbid obesity, who presents for follow up of an episode involving bilateral arm pain/stiffness and swelling, associated with significantly elevated inflammatory markers with a CRP greater than 90 and an ESR of greater than 130, thought to be secondary to PMR  Renetta Luis is currently on prednisone 6 mg once daily       # Polymyalgia rheumatica  - Edie presents today for follow-up of significantly elevated inflammatory markers, as well as abrupt onset of symptoms related to bilateral upper extremity pain/stiffness/swelling, with mild symptoms of pain noted in her pelvic girdle region, for which she was initially evaluated in October 2019   Other than persistent headaches also experienced over the past 1 year, she does not complain of additional concerning symptoms on her review of systems   Her physical examination has also not been revealing, and further evaluation such as autoimmune serologies, a bilateral temporal artery ultrasound with temporal artery biopsies, and a CT of her chest/abdomen and pelvis have all been unrevealing  Sergio Juan overall presentation was thought to be consistent with polymyalgia rheumatica   She states after few days of taking the initial steroids she noticed a significant improvement in her overall joint pains, swelling and stiffness, and has not had any major recurrences of the symptoms     - Since the last office visit she further decreased her prednisone dose to 6 mg once daily, and states that with doing so she had noticed a temporary recurrence of pain and swelling affecting her right wrist, but this has resolved    She is currently denying any significant joint pains, swelling (appears to be more so over the muscle region) or stiffness  I do not appreciate any clear synovitis on her physical examination today and as she is currently asymptomatic I recommend continuing with the prednisone taper to 5 mg once daily  We can continue to plan to decrease by 1 mg every 2 months or so  If with the steroid taper she notices worsening joint pains or there are objective findings then I may consider adding on methotrexate as steroid sparing      - Her inflammatory markers will be repeated today      - She will follow up with Neurology for the persistent headaches, but so far the evaluation has not revealed a rheumatic etiology for the chronic headaches      # Chronic steroid use  - She is aware of the side effects associated with chronic steroid use including but not limited to, risk for infections, cataracts/glaucoma, hypertension, worsening diabetic control, gastritis, osteoporosis and avascular necrosis   I advised her to continue daily calcium and vitamin-D supplements   She did undergo a recent DEXA scan in January 2020 which was normal     # Right hand neuropathy  - This could be related to diabetic neuropathy although it is mostly affecting an isolated digit  I will obtain an EMG/NCS study to further evaluate  Plan:  Diagnoses and all orders for this visit:    Polymyalgia rheumatica ([facility] [address] )  -     CBC and differential; Future  -     C-reactive protein; Future  -     Sedimentation rate, automated; Future    PMR (polymyalgia rheumatica) ([facility])  -     predniSONE 5 mg tablet; Take 1 tablet (5 mg total) by mouth daily    Current chronic use of systemic steroids    Neuropathy of hand, right  -     EMG 1 Limb;  Future    Insulin dependent type 2 diabetes mellitus ([facility])    Class 3 severe obesity without serious comorbidity with body mass index (BMI) of 45 0 to 49 9 in adult, unspecified obesity type ([facility] [address] )      Activities as tolerated    Diet: low carb/low fat, more greens/vegetables, adequate hydration  Exercise: try to maintain a low impact exercise regimen as much as possible  Walk for 30 minutes a day for at least 3 days a week    Encouraged to maintain good sleep hygiene  Continue other medications as prescribed by PCP and other specialists        RTC in 3 months          HPI    INITIAL VISIT NOTE:  Ms Michelle Hollins a 75-year-old  female with history significant for insulin-dependent diabetes mellitus, with complications related to retinopathy causing legal blindness bilaterally, asthma, osteoarthritis, microcytic anemia of unclear etiology and morbid obesity, who presents for further evaluation of an episode involving bilateral arm pain/stiffness and swelling, associated with significantly elevated inflammatory markers with a CRP greater than 90 and an ESR of greater than 130   She is referred by Dr Tiara Aguila a rheumatology consult      Patient reports in the first week of September 2019, she woke up one morning with sudden onset of pain and stiffness affecting her bilateral arms, to such an extent that she was unable to move them even slightly   This was also associated with a weakness sensation of her lower legs   As her symptoms did not improve within the week she was seen in the emergency room and admitted for further evaluation   Due to the visible swelling an x-ray of her hand and forearm were initially done which only showed dorsal soft tissue swelling   An upper extremity venous duplex ruled out evidence of a deep vein thrombosis   A CTA of her right upper extremity was essentially unremarkable except for edema noted in the subcutaneous fat of the distal forearm and hand although without a vascular etiology   The aortic arch and distal arteries were unremarkable      Further lab workup showed a leukocytosis of 14, with a chronic microcytic anemia with stable hemoglobin of 9, as well as thrombocytosis with platelets ranging from 500-700   Mild transaminitis was seen which eventually resolved, although she continued to have an elevated alkaline phosphatase which is still persistent   An ESR was found to be elevated at greater than 130, with a C reactive protein of greater than 90   An JUAN screen was positive at 1:80 homogeneous pattern   A hepatitis C antibody was found to be equivocal   An anti cardiolipin IgM antibody was mildly elevated at 13   TSH, blood cultures, uric acid, urinalysis, CK, rheumatoid factor, anti CCP antibody, anti neutrophilic cytoplasmic antibody, lupus anticoagulant, Lyme disease PCR, Sjogren's antibodies, SPEP, total complement, beta 2 glycoprotein antibodies, paraneoplastic profile, anti double-stranded DNA antibody, LDH, beta 2 microglobulin, immunoglobulin assay, and direct Gerri test were unremarkable      She reports during the hospital stay she declined the use of steroids due to the insulin-dependent diabetes, or pain medication use   Her symptoms were managed with Tylenol as needed, as well as therapy   She was also evaluated by vascular surgery during her hospital stay, with the workup being unrevealing   She was discharged home and advised to follow-up with Rheumatology for further evaluation   There was a concern for temporal arteritis versus polymyalgia rheumatica given the abrupt onset of her symptoms as well as the elevated inflammatory markers   She reports the majority of her symptoms have since resolved, and lasted for an entire duration of approximately 2 weeks   She did have follow-up labs done on September 30th which showed a persistently elevated ESR of 121, but with the down trending CRP of 23 2      Patient reports since December 2018 she has been experiencing new onset headaches which were diagnosed as migraine headaches by Neurology  Chelsea Lowe states that the headache is sometimes felt at the back of her head or over her entire head, but is not usually limited to one side   She was recently seen in the emergency room for the headaches as well and no acute findings were noted on an MRI brain are MRA head   The MRI of her brain did show cerebral atrophy more than expected for the patient's age  Barbara Salgueroer were also changes that could be consistent with mild chronic white matter microangiopathy versus sequelae of chronic migraine disease   A CTA head and neck was also unremarkable      She reports a few months ago her weight was 336 lb, and today she is presenting with a weight of 304 lb   She thinks this may be related to a decreased appetite   When her symptoms started at the beginning of September she did experience jaw pain on the right side which self-resolved   She does report a history of multiple miscarriages   She denies fevers, night sweats, scalp tenderness/pain, new vision changes (she does have a history of retinopathy related to insulin-dependent diabetes mellitus, and states she is legally blind in both eyes), current jaw claudication, hair loss, sicca symptoms, unexplained skin rashes, psoriasis, photosensitivity, mouth/nose ulcers, epistaxis, recurrent sinus disease, swollen glands, pleuritic chest pain, hemoptysis (she does have a chronic history of shortness of breath and cough related to asthma), abdominal pain, vomiting, diarrhea, blood in stools, blood clots, Raynaud's, focal weakness or family history of autoimmune disease   Her sister may have some type of arthritis      She is up-to-date with a mammogram, and states Pap smears were discontinued as she has had a hysterectomy  Hunterlupe Ramos has never had a colonoscopy         11/6/2019:  Patient presents for a follow-up today  Sepideh Marie reviewed the ultrasound done of her bilateral temporal arteries which was unrevealing   A bilateral temporal artery biopsy did not show any inflammatory changes   Her ESR and CRP were still elevated at 95 and 20 7, respectively   Her hemoglobin was stable at 9 3   Her anti smooth muscle antibody was mildly elevated at 25   Her C3 and C4 were also mildly elevated   The remainder of the CBC, CMP (except for the alkaline phosphatase), SPEP, immunofixation, CK, aldolase, HCV RNA, HIV, ferritin, anti mitochondrial antibody, urinalysis and urine protein creatinine ratio were unremarkable      She reports since the last office visit she has had approximately 4 flare-ups of pain and stiffness affecting her predominantly from the bilateral elbows up to her shoulders   She states that this has been occurring on a weekly basis, but it does take days to resolve and overlaps with the next flare she has  Adelita Alfonso recalls in the past she has also experienced pain in her bilateral hip region, but states currently her predominant symptoms have been occurring from the elbows upwards  Radha Curtis on occasion will she experience a discomfort from her wrists radiating into her hands  Adelita Alfonso denies any new complaints of joint pains or swelling      Since the last office visit she has also been experiencing increasing redness of her bilateral eyes   She did see her ophthalmologist a few weeks ago and apparently there was no inflammation detected  Adelita Alfonso is scheduled for a follow-up on November 20th      She has also been having worsening headaches which she feels in a bandlike fashion across her head  Adelita Alfonso has never been evaluated by Neurology         12/4/2019:  Patient presents for a follow-up of polymyalgia rheumatica  Adelita Alfonso is currently on oral prednisone 20 mg once daily   She did not have a chance to do her labs following the prior office visit      She reports a few days after starting the prednisone at 20 mg once daily she started to notice a significant improvement in her joint pains and swelling   She states it has also temporarily been helping with the headaches as well as the vision changes and redness of her eyes   As far she is aware she has never been told of an inflammatory eye disorder by her ophthalmologist  Adelita Alfonso reports with activities such as vacuuming and cleaning the house this has also been helping with her joint pain, but the improvement has all been amplified after starting the prednisone   Symptomatically she has been tolerating the steroids well, but on a few occasions has noticed blood sugar readings in the 400s to 500s   She has not contacted her endocrinologist with this information, and they are not aware that she has been started on steroids   She overall denies any joint pains, swelling or stiffness, but states on occasion with changes in the weather and the cooler temperatures she may feel some achiness      She continues to experience the headaches, and is scheduled for a neurology evaluation in February 2020   She was recently seen by Hematology and her lab abnormalities were all thought to be related to a reactive process   No other complaints noted at this time         1/8/2020:  Patient presents for a follow-up of polymyalgia rheumatica  Trent Stubbs is currently on oral prednisone 15 mg once daily   I reviewed her labs done yesterday which showed an ESR of 59 which had improved, but her C reactive protein continues to be elevated at 38  5      At the last office visit we had decreased her prednisone dose from 20-15 mg once daily   She mentions that there has been no significant change in her symptoms with doing so and she is tolerating the steroid taper well   Dependent on the weather she may have some achiness around her shoulder region, but otherwise denies any significant flare-ups of joint pain, swelling or stiffness   She is tolerating the prednisone well as well   She continues to check her blood sugars 3-4 times daily, and is in touch with her endocrinologist regarding the hyperglycemia   They did increase her insulin regimen      She will be following up with her ophthalmologist at the end of January   She continues to experience the eye redness and blurred vision  Trent Stubbs has an appointment with Neurology next month as she continues to experience the headaches         2/19/2020:  Patient presents for follow-up of polymyalgia rheumatica  Greg Ferreira is currently on prednisone 10 mg once daily      At the last office visit she was on 15 mg once daily, and we had discussed tapering by 2 5 mg every 2 weeks   She states at a dose of 12 5 mg once daily she started to notice a slight recurrence of pain mostly affecting her shoulders and right wrist, but this change worsened even further when she decreased to 10 mg once daily   She has been on this dose for approximately 2 weeks now  Greg Ferreira states that she is still able to exercise and work through the pain in her shoulders, but it has also affected her right wrist and very occasionally in her bilateral groin region   She has noticed mild swelling of her right shoulder without any other swollen joints   She is not really experiencing morning stiffness      She has had issues with significantly high blood sugar readings occasionally greater than 600  She is working closely with endocrinology to adjust her insulin regimen   In view of this she is very hesitant to increase her dose of the steroids again      She was recently seen by Neurology in view of the chronic headaches and this is thought to possibly be related to uncontrolled hypertension         4/1/2020:  Patient presents for a follow-up of polymyalgia rheumatica  She is currently on prednisone 8 mg once daily  I reviewed her ESR and CRP done following the last office visit which were down trending at 51 and 20 9, respectively      Patient reports following our last office visit in mid February she decreased her prednisone dose to 9 mg once daily and had been on that for the past 1 month  She further decreased her dose to 8 mg once daily today  She mentions while decreasing her prednisone dose from 10-9 mg once daily, she noticed a slight flare-up of pain and swelling affecting her right wrist, as well as intermittent pain affecting her right shoulder    She will also notice occasional pain affecting her bilateral knees, but this could be related to osteoarthritis  No other significant joint pains, swelling or stiffness noted with the steroid decrease  She denies any new headaches, vision changes or jaw claudication  She is continuing follow-up with neurology for the headaches she has been experiencing  She is also scheduled for an EEG next month      She has been careful with monitoring her blood pressures as well as blood sugars, and states that with adjustments to her insulin regimen her blood sugars are mostly under 200 at this time  9/23/2020:  Patient presents for a follow-up of polymyalgia rheumatica  She is currently on prednisone 6 mg once daily  She has not had any recent labs done  Since the last office visit she has been tapering by 1 mg every 2-3 months  She has been on the 6 mg once daily since August   She has not had any issues with the steroid taper  She states at one point of time she was having a flare-up of pain affecting her right wrist but this has resolved and she is currently denying any significant issues related to joint pains  She does feel like she has slight swelling and puffiness in her bilateral forearm region  She is not really experiencing any morning stiffness  She has noticed a numbness sensation of her right hand 5th digit which is bothersome to her  No other complaints noted today  She has been monitoring her blood sugars and follows with endocrinology for management of the insulin  The following portions of the patient's history were reviewed and updated as appropriate: allergies, current medications, past family history, past medical history, past social history, past surgical history and problem list       Review of Systems  Constitutional: Negative for weight change, fevers, chills, night sweats, fatigue    ENT/Mouth: Negative for hearing changes, ear pain, nasal congestion, sinus pain, hoarseness, sore throat, rhinorrhea, swallowing difficulty  Eyes: Negative for pain, redness, discharge, vision changes  Cardiovascular: Negative for chest pain, SOB, palpitations  Respiratory: Negative for cough, sputum, wheezing, dyspnea  Gastrointestinal: Negative for nausea, vomiting, diarrhea, constipation, pain, heartburn  Genitourinary: Negative for dysuria, urinary frequency, hematuria  Musculoskeletal: As per HPI  Skin: Negative for skin rash, color changes  Neuro: Negative for weakness, numbness, tingling, loss of consciousness  Psych: Negative for anxiety, depression  Heme/Lymph: Negative for easy bruising, bleeding, lymphadenopathy          Past Medical History:   Diagnosis Date    Anemia     Arthritis     Diabetes mellitus ([facility] )     Dyspnea on exertion     Hyperlipidemia     Hypertension     Legally blind 2010    Mild intermittent asthma with exacerbation 8/4/2018    Miscarriage     x 3    Seizures (HCC)     Sickle cell trait ([facility] )     Sleep apnea     Thyroid nodule 3/6/2020       Past Surgical History:   Procedure Laterality Date    CATARACT EXTRACTION Bilateral     august and september    EYE SURGERY      HYSTERECTOMY      42    OOPHORECTOMY Left     37    ME TEMPORAL ARTERY LIGATN OR BX Bilateral 10/17/2019    Procedure: BIOPSY ARTERY TEMPORAL;  Surgeon: Sapphire Salazar DO;  Location: [facility];  Service: Vascular    US GUIDED THYROID BIOPSY  5/13/2020       Social History     Socioeconomic History    Marital status: /Civil Union     Spouse name: Not on file    Number of children: Not on file    Years of education: Not on file    Highest education level: Not on file   Occupational History    Occupation: long term disability   Social Needs    Financial resource strain: Not very hard    Food insecurity     Worry: Sometimes true     Inability: Sometimes true    Transportation needs     Medical: No     Non-medical: No   Tobacco Use    Smoking status: Never Smoker    Smokeless tobacco: Never Used   Substance and Sexual Activity    Alcohol use: Never     Alcohol/week: 0 0 standard drinks     Frequency: Never     Drinks per session: Patient refused     Binge frequency: Never    Drug use: No    Sexual activity: Not Currently     Partners: Male     Birth control/protection: None   Lifestyle    Physical activity     Days per week: 0 days     Minutes per session: 0 min    Stress:  Only a little   Relationships    Social connections     Talks on phone: Twice a week     Gets together: Twice a week     Attends Nondenominational service: Not on file     Active member of club or organization: Not on file     Attends meetings of clubs or organizations: Not on file     Relationship status:     Intimate partner violence     Fear of current or ex partner: Not on file     Emotionally abused: Not on file     Physically abused: Not on file     Forced sexual activity: Not on file   Other Topics Concern    Not on file   Social History Narrative    Caffeine use    Resides with spouse and one son       Family History   Problem Relation Age of Onset    Heart attack Mother     Heart disease Mother     Hypertension Mother     No Known Problems Father     Heart disease Sister     No Known Problems Brother     No Known Problems Son     No Known Problems Daughter     Heart attack Maternal Grandmother     Heart disease Maternal Grandmother     Diabetes Other     Heart attack Other     No Known Problems Sister     No Known Problems Sister     No Known Problems Paternal Aunt     No Known Problems Son     Cancer Neg Hx     Stroke Neg Hx        No Known Allergies      Current Outpatient Medications:     albuterol (PROVENTIL HFA) 90 mcg/act inhaler, Inhale 2 puffs every 6 (six) hours as needed for wheezing For another 24 hours use every 4 hours standing, Disp: 6 7 g, Rfl: 0    aspirin (ADULT ASPIRIN EC LOW STRENGTH) 81 mg EC tablet, Take 81 mg by mouth daily , Disp: , Rfl:    atorvastatin (LIPITOR) 40 mg tablet, Take 1 tablet (40 mg total) by mouth daily, Disp: 90 tablet, Rfl: 1    SARAH MICROLET LANCETS lancets, Inject 1 applicator into the skin 4 (four) times a day, Disp: , Rfl:     Blood Glucose Monitoring Suppl (Contour Next Monitor) w/Device KIT, Disp 1 meter Dx; E11 9, Disp: 1 kit, Rfl: 1    Blood Pressure Monitor KIT, Check blood pressures BID, Disp: 1 each, Rfl: 0    budesonide-formoterol (SYMBICORT) 80-4 5 MCG/ACT inhaler, Inhale 2 puffs 2 (two) times a day Rinse mouth after use   (Patient taking differently: Inhale 2 puffs as needed Rinse mouth after use ), Disp: 1 Inhaler, Rfl: 5    Cholecalciferol (HM Vitamin D3) 100 MCG (4000 UT) CAPS, 4000 units daily, Disp: 60 capsule, Rfl: 5    Contour Next Test test strip, Test 3x daily, Disp: 300 each, Rfl: 3    Diclofenac Sodium POWD, , Disp: , Rfl:     diltiazem (CARDIZEM CD) 240 mg 24 hr capsule, Take 2 capsules (480 mg total) by mouth daily, Disp: 180 capsule, Rfl: 1    ferrous sulfate 324 (65 Fe) mg, Take 1 tablet (324 mg total) by mouth every other day, Disp: 45 tablet, Rfl: 3    gabapentin (NEURONTIN) 100 mg capsule, Take 1 capsule in am and 2-3 capsules at night, Disp: 90 capsule, Rfl: 3    hydrochlorothiazide (HYDRODIURIL) 25 mg tablet, Take 1 tablet (25 mg total) by mouth daily, Disp: 90 tablet, Rfl: 1    Insulin Pen Needle (BD PEN NEEDLE DERRICK U/F) 32G X 4 MM MISC, Inject 1 applicator under the skin 4 (four) times a day, Disp: , Rfl:     Insulin Regular Human, Conc, (HumuLIN R U-500 KwikPen) 500 units/mL CONCENTRATED U-500 injection pen, 110 units before breakfast, 60 units before dinner, Disp: 4 pen, Rfl: 3    losartan (COZAAR) 100 MG tablet, Take 1 tablet (100 mg total) by mouth daily, Disp: 90 tablet, Rfl: 1    magnesium oxide (MAG-OX) 400 mg, TAKE 1 TABLET BY MOUTH EVERY DAY, Disp: 30 tablet, Rfl: 0    metFORMIN (GLUCOPHAGE-XR) 500 mg 24 hr tablet, Take 2 tablets (1,000 mg total) by mouth 2 (two) times a day with meals, Disp: 120 tablet, Rfl: 5    predniSONE 5 mg tablet, Take 1 tablet (5 mg total) by mouth daily, Disp: 90 tablet, Rfl: 0    Riboflavin 400 MG TABS, Take 1 tablet (400 mg total) by mouth daily, Disp: 30 tablet, Rfl: 0    topiramate (TOPAMAX) 100 mg tablet, TAKE 1 TABLET BY MOUTH AT BEDTIME, Disp: 90 tablet, Rfl: 0      Objective:    Vitals:    09/23/20 1527   BP: 132/84   Pulse: 92   Temp: 99 8 °F (37 7 °C)       Physical Exam  General: Well appearing, well nourished, in no distress  Oriented x 3, normal mood and affect  Ambulating with aid of a cane  Obese  Skin: Good turgor, no rash, unusual bruising or prominent lesions  Hair: Normal texture and distribution  Nails: Normal color, no deformities  HEENT:  Head: Normocephalic, atraumatic  Eyes: Conjunctiva clear, sclera non-icteric, EOM intact  Extremities: No amputations or deformities, cyanosis, edema  Musculoskeletal:   Hands - she has mild puffiness present over her bilateral 3rd MCP regions, but this appears to be superficial and I do not appreciate any synovitis  Her PIP joints are unremarkable  The remainder of her MCPs are also normal   Wrists - she has very mild chronic synovial changes present at her bilateral wrists but without active soft tissue swelling or tenderness  She does have slight puffiness present proximal to her bilateral wrists, but I question if this may be related to her muscle contour  Elbows and shoulders - unremarkable  Neurologic: Alert and oriented  No focal neurological deficits appreciated  Psychiatric: Normal mood and affect  DOC Garcia    Rheumatology

## 2020-09-24 ENCOUNTER — TELEPHONE (OUTPATIENT)
Dept: RHEUMATOLOGY | Facility: CLINIC | Age: 60
End: 2020-09-24

## 2020-09-24 NOTE — TELEPHONE ENCOUNTER
----- Message from Kishor Jerez MD sent at 9/23/2020  6:06 PM EDT -----  Please let her know the inflammatory markers are elevated again, but as this is not correlating with her clinical symptoms I am going to recommend following the plan we discussed in the office today and I will periodically recheck the markers  Thanks  If she has any worsening joint symptoms as she decreases the steroids please ask her to call us back

## 2020-09-30 ENCOUNTER — PATIENT OUTREACH (OUTPATIENT)
Dept: FAMILY MEDICINE CLINIC | Facility: CLINIC | Age: 60
End: 2020-09-30

## 2020-09-30 DIAGNOSIS — Z78.9 NEED FOR FOLLOW-UP BY SOCIAL WORKER: Primary | ICD-10-CM

## 2020-09-30 NOTE — PROGRESS NOTES
Called patient to follow up  She states she fell asleep last night without her CPAP mask on and was trying to get in her hours now for the month (she has until noon)  I told her I would not keep her long because of this and asked how her blood sugars have been doing now that she has been back on her regular medication regimen  Patient did not give me any numbers but proceeded to inform me she has been under a lot of stress noting she has "problems at home" which are "affecting my finances"  She states her  moved in with her about a year ago and during this time things have been "so bad" noting she has been crying often  Patient reports "I've hidden so much"  She states all the stress is affecting her health  I asked if she would be willing to talk to SW and she agreed  She notes she has been opening up to her sister but her sister informs her she needs to speak to someone else  I gave support and told her this was a big step to admit she needed help  Will place a referral to SW and continue to follow

## 2020-10-05 ENCOUNTER — TELEPHONE (OUTPATIENT)
Dept: FAMILY MEDICINE CLINIC | Facility: CLINIC | Age: 60
End: 2020-10-05

## 2020-10-05 NOTE — TELEPHONE ENCOUNTER
Patient is calling back in relating her test results she is asking if someone can contact her back relating this she would like to speak with someone  She has a few additional questions        Call back# [phone]

## 2020-10-05 NOTE — TELEPHONE ENCOUNTER
Spoke with patient and as per Dr Luiza Morgan instructions she will go back up to 10 mg until she is feeling better, because she is now having issues with her other hand  Once she starts to feel better she will taper down by 1 mg every 2-4 weeks, while also keeping a close eye on her Blood Sugars

## 2020-10-06 ENCOUNTER — IMMUNIZATIONS (OUTPATIENT)
Dept: FAMILY MEDICINE CLINIC | Facility: CLINIC | Age: 60
End: 2020-10-06

## 2020-10-06 DIAGNOSIS — Z23 ENCOUNTER FOR IMMUNIZATION: Primary | ICD-10-CM

## 2020-10-06 PROCEDURE — 90682 RIV4 VACC RECOMBINANT DNA IM: CPT | Performed by: FAMILY MEDICINE

## 2020-10-06 PROCEDURE — G0008 ADMIN INFLUENZA VIRUS VAC: HCPCS | Performed by: FAMILY MEDICINE

## 2020-10-07 ENCOUNTER — TELEMEDICINE (OUTPATIENT)
Dept: FAMILY MEDICINE CLINIC | Facility: CLINIC | Age: 60
End: 2020-10-07

## 2020-10-07 DIAGNOSIS — R45.89 DEPRESSED MOOD: Primary | ICD-10-CM

## 2020-10-07 DIAGNOSIS — J45.21 MILD INTERMITTENT ASTHMA WITH EXACERBATION: ICD-10-CM

## 2020-10-07 PROCEDURE — G2025 DIS SITE TELE SVCS RHC/FQHC: HCPCS | Performed by: PHYSICIAN ASSISTANT

## 2020-10-07 RX ORDER — BUDESONIDE AND FORMOTEROL FUMARATE DIHYDRATE 80; 4.5 UG/1; UG/1
2 AEROSOL RESPIRATORY (INHALATION) 2 TIMES DAILY
Qty: 1 INHALER | Refills: 5 | Status: SHIPPED | OUTPATIENT
Start: 2020-10-07

## 2020-10-07 RX ORDER — ALBUTEROL SULFATE 90 UG/1
2 AEROSOL, METERED RESPIRATORY (INHALATION) EVERY 6 HOURS PRN
Qty: 6.7 G | Refills: 0 | Status: SHIPPED | OUTPATIENT
Start: 2020-10-07

## 2020-10-08 ENCOUNTER — TELEPHONE (OUTPATIENT)
Dept: ENDOCRINOLOGY | Facility: CLINIC | Age: 60
End: 2020-10-08

## 2020-10-08 ENCOUNTER — PATIENT OUTREACH (OUTPATIENT)
Dept: FAMILY MEDICINE CLINIC | Facility: CLINIC | Age: 60
End: 2020-10-08

## 2020-10-08 PROBLEM — R45.89 DEPRESSED MOOD: Status: ACTIVE | Noted: 2020-10-08

## 2020-10-21 ENCOUNTER — TELEMEDICINE (OUTPATIENT)
Dept: DIABETES SERVICES | Facility: CLINIC | Age: 60
End: 2020-10-21
Payer: MEDICARE

## 2020-10-21 DIAGNOSIS — E11.39 TYPE 2 DIABETES MELLITUS WITH OTHER OPHTHALMIC COMPLICATION, WITH LONG-TERM CURRENT USE OF INSULIN (HCC): Primary | ICD-10-CM

## 2020-10-21 DIAGNOSIS — Z79.4 TYPE 2 DIABETES MELLITUS WITH OTHER OPHTHALMIC COMPLICATION, WITH LONG-TERM CURRENT USE OF INSULIN (HCC): Primary | ICD-10-CM

## 2020-10-21 PROCEDURE — G0270 MNT SUBS TX FOR CHANGE DX: HCPCS | Performed by: DIETITIAN, REGISTERED

## 2020-10-22 ENCOUNTER — PATIENT OUTREACH (OUTPATIENT)
Dept: FAMILY MEDICINE CLINIC | Facility: CLINIC | Age: 60
End: 2020-10-22

## 2020-10-27 ENCOUNTER — PATIENT OUTREACH (OUTPATIENT)
Dept: FAMILY MEDICINE CLINIC | Facility: CLINIC | Age: 60
End: 2020-10-27

## 2020-11-03 NOTE — TELEPHONE ENCOUNTER
Called again and spoke to Galindo Ott, records were not faxed at first request  Galindo Ott will send a note to staff in charge of faxes to fax patient's records to us again to [phone]

## 2020-11-16 DIAGNOSIS — E11.42 DIABETIC POLYNEUROPATHY ASSOCIATED WITH TYPE 2 DIABETES MELLITUS (HCC): ICD-10-CM

## 2020-11-16 RX ORDER — GABAPENTIN 100 MG/1
CAPSULE ORAL
Qty: 90 CAPSULE | Refills: 3 | Status: SHIPPED | OUTPATIENT
Start: 2020-11-16 | End: 2021-01-15 | Stop reason: SDUPTHER

## 2020-11-24 ENCOUNTER — PATIENT OUTREACH (OUTPATIENT)
Dept: FAMILY MEDICINE CLINIC | Facility: CLINIC | Age: 60
End: 2020-11-24

## 2020-11-24 DIAGNOSIS — Z78.9 NEED FOR FOLLOW-UP BY SOCIAL WORKER: Primary | ICD-10-CM

## 2020-12-11 ENCOUNTER — TELEPHONE (OUTPATIENT)
Dept: OBGYN CLINIC | Facility: HOSPITAL | Age: 60
End: 2020-12-11

## 2020-12-11 ENCOUNTER — TELEPHONE (OUTPATIENT)
Dept: FAMILY MEDICINE CLINIC | Facility: CLINIC | Age: 60
End: 2020-12-11

## 2020-12-11 DIAGNOSIS — M35.3 PMR (POLYMYALGIA RHEUMATICA) (HCC): ICD-10-CM

## 2020-12-11 DIAGNOSIS — I10 ESSENTIAL HYPERTENSION: ICD-10-CM

## 2020-12-11 RX ORDER — PREDNISONE 1 MG/1
10 TABLET ORAL DAILY
Qty: 60 TABLET | Refills: 2 | Status: SHIPPED | OUTPATIENT
Start: 2020-12-11 | End: 2021-01-15 | Stop reason: SDUPTHER

## 2020-12-11 RX ORDER — DILTIAZEM HYDROCHLORIDE 240 MG/1
480 CAPSULE, COATED, EXTENDED RELEASE ORAL DAILY
Qty: 180 CAPSULE | Refills: 1 | Status: SHIPPED | OUTPATIENT
Start: 2020-12-11 | End: 2021-03-16 | Stop reason: SDUPTHER

## 2020-12-11 RX ORDER — PREDNISONE 10 MG/1
10 TABLET ORAL DAILY
Qty: 90 TABLET | Refills: 0 | Status: SHIPPED | OUTPATIENT
Start: 2020-12-11 | End: 2020-12-11 | Stop reason: SDUPTHER

## 2020-12-15 ENCOUNTER — PATIENT OUTREACH (OUTPATIENT)
Dept: FAMILY MEDICINE CLINIC | Facility: CLINIC | Age: 60
End: 2020-12-15

## 2020-12-22 ENCOUNTER — PATIENT OUTREACH (OUTPATIENT)
Dept: FAMILY MEDICINE CLINIC | Facility: CLINIC | Age: 60
End: 2020-12-22

## 2021-01-06 DIAGNOSIS — IMO0002 DIABETES MELLITUS TYPE 2 WITH COMPLICATIONS, UNCONTROLLED: ICD-10-CM

## 2021-01-06 DIAGNOSIS — IMO0002 UNCONTROLLED TYPE 2 DIABETES MELLITUS WITH OPHTHALMIC COMPLICATION, WITH LONG-TERM CURRENT USE OF INSULIN: ICD-10-CM

## 2021-01-06 RX ORDER — METFORMIN HYDROCHLORIDE 500 MG/1
1000 TABLET, EXTENDED RELEASE ORAL 2 TIMES DAILY WITH MEALS
Qty: 120 TABLET | Refills: 5 | Status: SHIPPED | OUTPATIENT
Start: 2021-01-06 | End: 2021-02-16 | Stop reason: SDUPTHER

## 2021-01-06 RX ORDER — INSULIN HUMAN 500 [IU]/ML
INJECTION, SOLUTION SUBCUTANEOUS
Qty: 4 PEN | Refills: 3 | Status: SHIPPED | OUTPATIENT
Start: 2021-01-06 | End: 2021-02-16 | Stop reason: SDUPTHER

## 2021-01-15 ENCOUNTER — TELEPHONE (OUTPATIENT)
Dept: OBGYN CLINIC | Facility: MEDICAL CENTER | Age: 61
End: 2021-01-15

## 2021-01-15 DIAGNOSIS — M35.3 PMR (POLYMYALGIA RHEUMATICA) (HCC): ICD-10-CM

## 2021-01-15 DIAGNOSIS — E11.42 DIABETIC POLYNEUROPATHY ASSOCIATED WITH TYPE 2 DIABETES MELLITUS (HCC): ICD-10-CM

## 2021-01-15 RX ORDER — PREDNISONE 1 MG/1
10 TABLET ORAL DAILY
Qty: 180 TABLET | Refills: 0 | Status: SHIPPED | OUTPATIENT
Start: 2021-01-15 | End: 2021-04-22

## 2021-01-15 RX ORDER — GABAPENTIN 100 MG/1
CAPSULE ORAL
Qty: 90 CAPSULE | Refills: 3 | Status: SHIPPED | OUTPATIENT
Start: 2021-01-15 | End: 2021-02-05 | Stop reason: SDUPTHER

## 2021-01-15 NOTE — TELEPHONE ENCOUNTER
Patient sees Dr Michael Hollingsworth  Patient requesting refill on Prednisone 5 mg    2 x day, 0 left  Ran out a few days ago  Please call into [facility] at [address] in [place]    [phone]  90 day supply     # [phone]

## 2021-01-19 ENCOUNTER — PATIENT OUTREACH (OUTPATIENT)
Dept: FAMILY MEDICINE CLINIC | Facility: CLINIC | Age: 61
End: 2021-01-19

## 2021-01-19 NOTE — PROGRESS NOTES
I called the patient to follow up  She states she is "fine"  She states she has been working diligently in trying to [employer]  She reportedly was informed she has been approved for Medicare [employer] and notes she was told an insurance card was sent out to her 12/7/20  She has not received it thus far and has called regarding this without success  She spoke in great detail and notes she is "disappointed and frustrated" regarding this entire ordeal since she has pushed off many specialist appointments  She states she needs to see a retinal specialist since she was told she has glaucoma which is progressively getting worse  The patient is scheduled to see Endo and the diabetic educator in a few weeks but she is unsure if she will be able to attend without having insurance  The patient notes she is still not receiving any monies from disability and was told by her  she could not appeal   She notes she is trying to get payments reinstated  Patient reports her blood sugars are "better"  She notes her fasting this morning was 182 but most have been 70s-90s with an occasional low in the 50s  The patient states she has all her medications and does not need refills at this time  She states she switched pharmacies to [facility] and is relieved to have them again  I will continue to follow  The patient was thankful for this call

## 2021-01-21 NOTE — PROGRESS NOTES
Discussed the case with Papito Junior, regarding patient obtaining a secondary insurance (patient currently awaiting new insurance card)

## 2021-01-25 ENCOUNTER — TELEPHONE (OUTPATIENT)
Dept: FAMILY MEDICINE CLINIC | Facility: CLINIC | Age: 61
End: 2021-01-25

## 2021-01-25 NOTE — TELEPHONE ENCOUNTER
Pt called looking for refill status gabapentin (NEURONTIN) 100 mg capsule    Correct pharmacy on file

## 2021-02-05 DIAGNOSIS — E11.42 DIABETIC POLYNEUROPATHY ASSOCIATED WITH TYPE 2 DIABETES MELLITUS (HCC): ICD-10-CM

## 2021-02-05 RX ORDER — GABAPENTIN 100 MG/1
CAPSULE ORAL
Qty: 90 CAPSULE | Refills: 3 | Status: SHIPPED | OUTPATIENT
Start: 2021-02-05 | End: 2021-04-05 | Stop reason: SDUPTHER

## 2021-02-08 ENCOUNTER — TELEPHONE (OUTPATIENT)
Dept: DIABETES SERVICES | Facility: CLINIC | Age: 61
End: 2021-02-08

## 2021-02-08 NOTE — TELEPHONE ENCOUNTER
Thnag Dennison did not wish to proceed with her virtual MNT follow-up visit stating that she is sorting out insurance issues  She will reschedule the appointment when her new insurance is in place  Today's visit has been cancelled

## 2021-02-08 NOTE — TELEPHONE ENCOUNTER
Kan Langston spoke with [facility] today & straightened everything out  She has to get her insurance straightened out  Also, she pushed back her follow-up to March until she gets it all squared away  She hated to do this but had no choice  Thank you!

## 2021-02-16 DIAGNOSIS — IMO0002 UNCONTROLLED TYPE 2 DIABETES MELLITUS WITH OPHTHALMIC COMPLICATION, WITH LONG-TERM CURRENT USE OF INSULIN: ICD-10-CM

## 2021-02-16 DIAGNOSIS — I10 ESSENTIAL HYPERTENSION: ICD-10-CM

## 2021-02-16 DIAGNOSIS — IMO0002 DIABETES MELLITUS TYPE 2 WITH COMPLICATIONS, UNCONTROLLED: ICD-10-CM

## 2021-02-16 DIAGNOSIS — E78.49 OTHER HYPERLIPIDEMIA: ICD-10-CM

## 2021-02-16 RX ORDER — HYDROCHLOROTHIAZIDE 25 MG/1
25 TABLET ORAL DAILY
Qty: 90 TABLET | Refills: 1 | Status: SHIPPED | OUTPATIENT
Start: 2021-02-16 | End: 2021-07-22

## 2021-02-16 RX ORDER — LOSARTAN POTASSIUM 100 MG/1
100 TABLET ORAL DAILY
Qty: 90 TABLET | Refills: 1 | Status: SHIPPED | OUTPATIENT
Start: 2021-02-16 | End: 2021-07-22

## 2021-02-16 RX ORDER — INSULIN HUMAN 500 [IU]/ML
INJECTION, SOLUTION SUBCUTANEOUS
Qty: 12 PEN | Refills: 1 | Status: SHIPPED | OUTPATIENT
Start: 2021-02-16 | End: 2021-03-26 | Stop reason: SDUPTHER

## 2021-02-16 RX ORDER — ATORVASTATIN CALCIUM 40 MG/1
40 TABLET, FILM COATED ORAL DAILY
Qty: 90 TABLET | Refills: 1 | Status: SHIPPED | OUTPATIENT
Start: 2021-02-16 | End: 2021-07-22

## 2021-02-16 RX ORDER — METFORMIN HYDROCHLORIDE 500 MG/1
1000 TABLET, EXTENDED RELEASE ORAL 2 TIMES DAILY WITH MEALS
Qty: 360 TABLET | Refills: 1 | Status: SHIPPED | OUTPATIENT
Start: 2021-02-16 | End: 2021-07-22

## 2021-02-23 ENCOUNTER — PATIENT OUTREACH (OUTPATIENT)
Dept: FAMILY MEDICINE CLINIC | Facility: CLINIC | Age: 61
End: 2021-02-23

## 2021-02-23 DIAGNOSIS — IMO0002 DIABETES MELLITUS TYPE 2 WITH COMPLICATIONS, UNCONTROLLED: ICD-10-CM

## 2021-02-23 NOTE — PROGRESS NOTES
I called the patient to follow up  She states she feels "fine" but noted her blood sugars are "not good lately"  She states she has been out of her medications and is trying to keep her blood sugars under 600  I stressed to her this is too high and asked if her glucometer has given her the reading of HI  She responded this has not happened  If asked her if it did to go to the ED and she agreed  She notes she has been watching her diet  She states she did notify her Endocrinology office about not having any medications  The patient also contacted the delivery [facility] and was told they expedited her medications  She was supposed to receive them today but the date is moved back to Thursday  She expressed much frustration over this entire issue and I offered support  I again strongly encouraged her to keep a strict watch on her diet and if her blood sugars remain elevated to go to the ED  She states she is "fully aware" of the importance and understands how critical it is to keep her sugar under control  She did admit to her vision getting "dim" at one point and she states when this happens, her sugar is high  I reiterated if this happens again, do not hesitate and get to the ED  I will continue to follow

## 2021-03-04 DIAGNOSIS — I67.1 CEREBRAL ANEURYSM WITHOUT RUPTURE: Primary | ICD-10-CM

## 2021-03-10 ENCOUNTER — LAB (OUTPATIENT)
Dept: LAB | Facility: HOSPITAL | Age: 61
End: 2021-03-10
Attending: INTERNAL MEDICINE
Payer: MEDICARE

## 2021-03-10 DIAGNOSIS — E04.1 THYROID NODULE: ICD-10-CM

## 2021-03-10 DIAGNOSIS — I67.1 CEREBRAL ANEURYSM WITHOUT RUPTURE: ICD-10-CM

## 2021-03-10 DIAGNOSIS — Z23 ENCOUNTER FOR IMMUNIZATION: ICD-10-CM

## 2021-03-10 DIAGNOSIS — E11.29 TYPE 2 DIABETES MELLITUS WITH MICROALBUMINURIA, WITH LONG-TERM CURRENT USE OF INSULIN (HCC): ICD-10-CM

## 2021-03-10 DIAGNOSIS — Z79.4 TYPE 2 DIABETES MELLITUS WITH HYPERGLYCEMIA, WITH LONG-TERM CURRENT USE OF INSULIN (HCC): ICD-10-CM

## 2021-03-10 DIAGNOSIS — Z79.4 TYPE 2 DIABETES MELLITUS WITH MICROALBUMINURIA, WITH LONG-TERM CURRENT USE OF INSULIN (HCC): ICD-10-CM

## 2021-03-10 DIAGNOSIS — R80.9 TYPE 2 DIABETES MELLITUS WITH MICROALBUMINURIA, WITH LONG-TERM CURRENT USE OF INSULIN (HCC): ICD-10-CM

## 2021-03-10 DIAGNOSIS — E11.65 TYPE 2 DIABETES MELLITUS WITH HYPERGLYCEMIA, WITH LONG-TERM CURRENT USE OF INSULIN (HCC): ICD-10-CM

## 2021-03-10 LAB
ALBUMIN SERPL BCP-MCNC: 4.5 G/DL (ref 3–5.2)
ALP SERPL-CCNC: 116 U/L (ref 43–122)
ALT SERPL W P-5'-P-CCNC: 16 U/L (ref 9–52)
ANION GAP SERPL CALCULATED.3IONS-SCNC: 12 MMOL/L (ref 5–14)
AST SERPL W P-5'-P-CCNC: 20 U/L (ref 14–36)
BILIRUB SERPL-MCNC: 0.5 MG/DL
BUN SERPL-MCNC: 28 MG/DL (ref 5–25)
CALCIUM SERPL-MCNC: 9.8 MG/DL (ref 8.4–10.2)
CHLORIDE SERPL-SCNC: 97 MMOL/L (ref 97–108)
CO2 SERPL-SCNC: 32 MMOL/L (ref 22–30)
CREAT SERPL-MCNC: 1.26 MG/DL (ref 0.6–1.2)
GFR SERPL CREATININE-BSD FRML MDRD: 53 ML/MIN/1.73SQ M
GLUCOSE SERPL-MCNC: 265 MG/DL (ref 70–99)
POTASSIUM SERPL-SCNC: 4.4 MMOL/L (ref 3.6–5)
PROT SERPL-MCNC: 8 G/DL (ref 5.9–8.4)
SODIUM SERPL-SCNC: 141 MMOL/L (ref 137–147)
TSH SERPL DL<=0.05 MIU/L-ACNC: 0.74 UIU/ML (ref 0.47–4.68)

## 2021-03-10 PROCEDURE — 84443 ASSAY THYROID STIM HORMONE: CPT

## 2021-03-10 PROCEDURE — 80053 COMPREHEN METABOLIC PANEL: CPT

## 2021-03-10 PROCEDURE — 83036 HEMOGLOBIN GLYCOSYLATED A1C: CPT

## 2021-03-10 PROCEDURE — 36415 COLL VENOUS BLD VENIPUNCTURE: CPT

## 2021-03-10 PROCEDURE — 84439 ASSAY OF FREE THYROXINE: CPT

## 2021-03-11 LAB
EST. AVERAGE GLUCOSE BLD GHB EST-MCNC: 243 MG/DL
HBA1C MFR BLD: 10.1 %
T4 FREE SERPL-MCNC: 0.88 NG/DL (ref 0.76–1.46)

## 2021-03-11 NOTE — RESULT ENCOUNTER NOTE
Please call the patient regarding labs - A1C improved to 10 but still high - bring in log to upcoming visit , rest of labs to be discussed on visit

## 2021-03-12 ENCOUNTER — TELEPHONE (OUTPATIENT)
Dept: ENDOCRINOLOGY | Facility: CLINIC | Age: 61
End: 2021-03-12

## 2021-03-12 ENCOUNTER — HOSPITAL ENCOUNTER (OUTPATIENT)
Dept: CT IMAGING | Facility: HOSPITAL | Age: 61
Discharge: HOME | End: 2021-03-12
Payer: MEDICARE

## 2021-03-12 DIAGNOSIS — I67.1 CEREBRAL ANEURYSM WITHOUT RUPTURE: ICD-10-CM

## 2021-03-12 DIAGNOSIS — I67.1 LEFT CAVERNOUS CAROTID ANEURYSM: ICD-10-CM

## 2021-03-12 PROCEDURE — 70496 CT ANGIOGRAPHY HEAD: CPT

## 2021-03-12 RX ADMIN — IOHEXOL 85 ML: 350 INJECTION, SOLUTION INTRAVENOUS at 12:30

## 2021-03-12 NOTE — TELEPHONE ENCOUNTER
----- Message from Romain Padilla MD sent at 3/11/2021  3:59 PM EST -----  Please call the patient regarding labs - A1C improved to 10 but still high - bring in log to upcoming visit , rest of labs to be discussed on visit

## 2021-03-16 ENCOUNTER — OFFICE VISIT (OUTPATIENT)
Dept: NEUROLOGY | Facility: CLINIC | Age: 61
End: 2021-03-16
Payer: MEDICARE

## 2021-03-16 VITALS
HEIGHT: 68 IN | SYSTOLIC BLOOD PRESSURE: 160 MMHG | WEIGHT: 293 LBS | HEART RATE: 100 BPM | DIASTOLIC BLOOD PRESSURE: 85 MMHG | BODY MASS INDEX: 44.41 KG/M2

## 2021-03-16 DIAGNOSIS — I67.1 CEREBRAL ANEURYSM WITHOUT RUPTURE: ICD-10-CM

## 2021-03-16 DIAGNOSIS — M35.3 PMR (POLYMYALGIA RHEUMATICA) (HCC): ICD-10-CM

## 2021-03-16 DIAGNOSIS — G43.709 CHRONIC MIGRAINE WITHOUT AURA WITHOUT STATUS MIGRAINOSUS, NOT INTRACTABLE: Primary | ICD-10-CM

## 2021-03-16 DIAGNOSIS — G43.009 MIGRAINE WITHOUT AURA AND WITHOUT STATUS MIGRAINOSUS, NOT INTRACTABLE: ICD-10-CM

## 2021-03-16 DIAGNOSIS — I72.9 ANEURYSM (HCC): ICD-10-CM

## 2021-03-16 DIAGNOSIS — H53.8 BLURRY VISION, BILATERAL: ICD-10-CM

## 2021-03-16 DIAGNOSIS — Z86.69 HISTORY OF ABSENCE SEIZURES: ICD-10-CM

## 2021-03-16 DIAGNOSIS — G47.10 HYPERSOMNIA: ICD-10-CM

## 2021-03-16 DIAGNOSIS — IMO0002 DIABETES MELLITUS TYPE 2 WITH COMPLICATIONS, UNCONTROLLED: ICD-10-CM

## 2021-03-16 DIAGNOSIS — I10 ESSENTIAL HYPERTENSION: ICD-10-CM

## 2021-03-16 DIAGNOSIS — G47.33 OBSTRUCTIVE SLEEP APNEA: ICD-10-CM

## 2021-03-16 PROCEDURE — 99214 OFFICE O/P EST MOD 30 MIN: CPT | Performed by: PHYSICIAN ASSISTANT

## 2021-03-16 RX ORDER — DILTIAZEM HYDROCHLORIDE 240 MG/1
480 CAPSULE, COATED, EXTENDED RELEASE ORAL DAILY
Qty: 180 CAPSULE | Refills: 1 | Status: SHIPPED | OUTPATIENT
Start: 2021-03-16 | End: 2021-03-23 | Stop reason: SDUPTHER

## 2021-03-16 RX ORDER — TOPIRAMATE 100 MG/1
TABLET, FILM COATED ORAL
Qty: 90 TABLET | Refills: 1 | Status: SHIPPED | OUTPATIENT
Start: 2021-03-16 | End: 2021-07-22

## 2021-03-16 NOTE — TELEPHONE ENCOUNTER
Manish Howard,  I saw the pt today, and she is requesting a refill of diltiazem  I pended for your review  States she has been out for 6 days, and BP is elevated  I did  on importance of compliance  Thank you

## 2021-03-16 NOTE — PROGRESS NOTES
Neurology  Kailey Pablo is a 56 y o  female    [id number]        Assessment/Recommendations:    1  Chronic migraine without aura without status migrainosus, not intractable  topiramate (TOPAMAX) 100 mg tablet   2  Blurry vision, bilateral  Ambulatory Referral to Ophthalmology   3  Aneurysm ([facility] )     4  PMR (polymyalgia rheumatica) ([facility])     5  Cerebral aneurysm without rupture     6  Migraine without aura and without status migrainosus, not intractable     7  Diabetes mellitus type 2 with complications, uncontrolled ([facility] )     8  Obstructive sleep apnea     9  History of absence seizures     10  Hypersomnia       Message sent to PCP re: BP med  Pt states she has not been taking diltiazem because she does not have a refill  She has not taken the medication for 6 days per her history  This would be explanatory for her hypertension today  I counseled her on the importance of medication compliance which she understands  This is especially important in light of thoracic aortic aneurysm and cerebral aneurysms and prevention of rupture  She has a follow-up with Neurosurgery tomorrow to review CTA head and any change or stability of the cerebral aneurysms  She is a nonsmoker  Her migraine headaches are significantly reduced with Topamax 100 mg q h s     Patient was not told that she has glaucoma in the past, however there is question of this recently  She is being referred to a specialist today for more detailed evaluation, and if glaucoma is suspect the patient should discontinue Topamax  For now she will continue the medication, and if needed in the future we will replace Topamax with CGRP injectables as one idea  The patient was advised to use her CPAP more regularly  She understands the importance of using this on a regular basis  EMG scheduled for RUE r/o CTS  The patient has polymyalgia rheumatica and is on prednisone per Dr Madan Sherman    When she decreased from 5 to 10 mg pain and stiffness worsened significantly  Unfortunately she also has diabetes and is finding it difficult to control sugars and lose weight  I advised her to discuss this in detail with her rheumatologist, and if possible decrease prednisone to 7 5 for a better balance or substitute alternatives if possible  Continue follow-up with endocrinology for diabetes, which as noted above is challenging with prednisone  The patient should not hesitate to call me prior to her follow up with any questions or concerns  Chief Complaint:  Migraine  patient is doing better      Subjective:    HPI  The patient reports that she decreased prednisone 5 mg since last seen but her pain joint pain and stiffness worsen significantly, so she had to go back up to 10 mg daily  She reports significant stiffness throughout, had to use a walker more often, had difficulty moving  Joint stiffness was worse in the legs, hands and fingers  She reports swelling in the right wrist   She has an EMG scheduled soon to evaluate for underlying nerve abnormality  The patient has been doing leg exercises on her own at home  She has been noticing more weakness in the legs  Denies falls  She notices that the cold weather makes her legs freeze up and makes them more stiff  The leg exercises to significantly improve the weakness  The patient admits that she is not using her CPAP every night  She is going to try to do this  She states she is so tired throughout the day and takes naps very easily  She falls asleep too easily  Sometimes she falls asleep before she could put the CPAP on her  With Topamax she has had very few migraines  She denies side effects  Sometimes when she bends over and picks things up on the floor she gets a headache but it is not overly bothersome  The patient had a repeat ophthalmology exam in September, and we need to obtain those results  She follows with Dr Pita Lowe    She was told she needs to find a new ophthalmologist   In the past, such as the last eye exam in May, there was no mention of glaucoma          Patient Active Problem List   Diagnosis    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, with long-term current use of insulin (HCC)    Benign hypertension    Seizures (HCC)    Exertional dyspnea    Enlarged pulmonary artery (HCC)    Aortic dilatation (HCC)    Anemia    Decreased pulses in feet    Diabetes mellitus type 2 with complications, uncontrolled (HCC)    Hyperlipidemia    Microalbuminuria    Obstructive sleep apnea    Class 3 severe obesity with serious comorbidity and body mass index (BMI) of 45 0 to 49 9 in adult [facility])    Peripheral neuropathy    Prolonged QT interval    Visual impairment in both eyes    Vitamin D deficiency    Lung nodules    Orthostatic hypotension    Mild intermittent asthma with exacerbation    Type 2 diabetes mellitus with microalbuminuria, with long-term current use of insulin (HCC)    Frequent headaches    Other inflammatory and immune myopathies, not elsewhere classified    Transaminitis    Morbid obesity ([address] )    Polyneuropathy associated with underlying disease ([address] )    PMR (polymyalgia rheumatica) ([address] )    Thrombocytosis ([address] )    Left cavernous carotid aneurysm    Type 2 diabetes mellitus with hyperglycemia, with long-term current use of insulin (HCC)    Aneurysm (HCC)    Thyroid nodule    History of absence seizures    Hypersomnia    Migraine without aura and without status migrainosus, not intractable    Cerebral aneurysm without rupture    Chronic migraine without aura without status migrainosus, not intractable    Diabetes mellitus ([address] )    Essential hypertension    Depressed mood    Blurry vision, bilateral     Past Medical History:   Diagnosis Date    Anemia     Arthritis     Diabetes mellitus ([address] )     Dyspnea on exertion     Hyperlipidemia     Hypertension     Legally blind 2010    Mild intermittent asthma with exacerbation 8/4/2018    Miscarriage     x 3    Seizures (HCC)     Sickle cell trait ([facility] )     Sleep apnea     Thyroid nodule 3/6/2020     Social History     Socioeconomic History    Marital status: /Civil Union     Spouse name: Not on file    Number of children: Not on file    Years of education: Not on file    Highest education level: Not on file   Occupational History    Occupation: long term disability   Social Needs    Financial resource strain: Not very hard    Food insecurity     Worry: Sometimes true     Inability: Sometimes true    Transportation needs     Medical: No     Non-medical: No   Tobacco Use    Smoking status: Never Smoker    Smokeless tobacco: Never Used   Substance and Sexual Activity    Alcohol use: Never     Alcohol/week: 0 0 standard drinks     Frequency: Never     Drinks per session: Patient refused     Binge frequency: Never    Drug use: No    Sexual activity: Not Currently     Partners: Male     Birth control/protection: None   Lifestyle    Physical activity     Days per week: 0 days     Minutes per session: 0 min    Stress:  Only a little   Relationships    Social connections     Talks on phone: Twice a week     Gets together: Twice a week     Attends [employer] service: Not on file     Active member of club or organization: Not on file     Attends meetings of clubs or organizations: Not on file     Relationship status:     Intimate partner violence     Fear of current or ex partner: Not on file     Emotionally abused: Not on file     Physically abused: Not on file     Forced sexual activity: Not on file   Other Topics Concern    Not on file   Social History Narrative    Caffeine use    Resides with spouse and one son     Family History   Problem Relation Age of Onset    Heart attack Mother     Heart disease Mother     Hypertension Mother     No Known Problems Father     Heart disease Sister     No Known Problems Brother  No Known Problems Son     No Known Problems Daughter     Heart attack Maternal Grandmother     Heart disease Maternal Grandmother     Diabetes Other     Heart attack Other     No Known Problems Sister     No Known Problems Sister     No Known Problems Paternal Aunt     No Known Problems Son     Cancer Neg Hx     Stroke Neg Hx      Past Surgical History:   Procedure Laterality Date    CATARACT EXTRACTION Bilateral     august and september    EYE SURGERY      HYSTERECTOMY      39    OOPHORECTOMY Left     39    MA TEMPORAL ARTERY LIGATN OR BX Bilateral 10/17/2019    Procedure: BIOPSY ARTERY TEMPORAL;  Surgeon: Pavel Riggs DO;  [facility]: [facility];  Service: Vascular    US GUIDED THYROID BIOPSY  5/13/2020     Current Outpatient Medications on File Prior to Visit   Medication Sig Dispense Refill    albuterol (Proventil HFA) 90 mcg/act inhaler Inhale 2 puffs every 6 (six) hours as needed for wheezing For another 24 hours use every 4 hours standing 6 7 g 0    aspirin (ADULT ASPIRIN EC LOW STRENGTH) 81 mg EC tablet Take 81 mg by mouth daily       atorvastatin (LIPITOR) 40 mg tablet Take 1 tablet (40 mg total) by mouth daily 90 tablet 1    [employer]ii MICROLET LANCETS lancets Inject 1 applicator into the skin 4 (four) times a day      Blood Glucose Monitoring Suppl (Contour Next Monitor) w/Device KIT Disp 1 meter Dx; E11 9 1 kit 1    Blood Pressure Monitor KIT Check blood pressures BID 1 each 0    budesonide-formoterol (SYMBICORT) 80-4 5 MCG/ACT inhaler Inhale 2 puffs 2 (two) times a day Rinse mouth after use   1 Inhaler 5    Cholecalciferol (HM Vitamin D3) 100 MCG (4000 UT) CAPS 4000 units daily 60 capsule 5    Contour Next Test test strip Test 3x daily 300 each 3    Diclofenac Sodium POWD       gabapentin (NEURONTIN) 100 mg capsule Take 1 capsule in am and 2-3 capsules at night 90 capsule 3    hydrochlorothiazide (HYDRODIURIL) 25 mg tablet Take 1 tablet (25 mg total) by mouth daily 90 tablet 1    Insulin Regular Human, Conc, (HumuLIN R U-500 KwikPen) 500 units/mL CONCENTRATED U-500 injection pen 110 units before breakfast, 60 units before dinner 12 pen 1    losartan (COZAAR) 100 MG tablet Take 1 tablet (100 mg total) by mouth daily 90 tablet 1    magnesium oxide (MAG-OX) 400 mg TAKE 1 TABLET BY MOUTH EVERY DAY 30 tablet 0    metFORMIN (GLUCOPHAGE-XR) 500 mg 24 hr tablet Take 2 tablets (1,000 mg total) by mouth 2 (two) times a day with meals 360 tablet 1    predniSONE 5 mg tablet Take 2 tablets (10 mg total) by mouth daily 180 tablet 0    [DISCONTINUED] diltiazem (CARDIZEM CD) 240 mg 24 hr capsule Take 2 capsules (480 mg total) by mouth daily 180 capsule 1    [DISCONTINUED] topiramate (TOPAMAX) 100 mg tablet TAKE 1 TABLET BY MOUTH AT BEDTIME 90 tablet 0    ferrous sulfate 324 (65 Fe) mg Take 1 tablet (324 mg total) by mouth every other day (Patient not taking: Reported on 3/16/2021) 45 tablet 3    Insulin Pen Needle (BD PEN NEEDLE DERRICK U/F) 32G X 4 MM MISC Inject 1 applicator under the skin 4 (four) times a day      Riboflavin 400 MG TABS Take 1 tablet (400 mg total) by mouth daily (Patient not taking: Reported on 3/16/2021) 30 tablet 0     No current facility-administered medications on file prior to visit  No Known Allergies        ROS:  Review of Systems   Constitutional: Negative  Negative for appetite change and fever  HENT: Positive for voice change  Negative for hearing loss, tinnitus and trouble swallowing  Eyes: Positive for photophobia  Negative for pain  Respiratory: Positive for shortness of breath  Cardiovascular: Negative  Negative for palpitations  Gastrointestinal: Negative  Negative for nausea and vomiting  Endocrine: Negative  Negative for cold intolerance  Genitourinary: Negative for dysuria and frequency  Musculoskeletal: Negative  Negative for myalgias and neck pain  Skin: Negative  Negative for rash     Neurological: Positive for dizziness and light-headedness  Negative for tremors, seizures, facial asymmetry, speech difficulty, weakness, numbness and headaches  Hematological: Negative  Does not bruise/bleed easily  Psychiatric/Behavioral: Positive for sleep disturbance  Negative for confusion and hallucinations  Objective:  /85 (BP Location: Left arm, Patient Position: Sitting, Cuff Size: Large)   Pulse 100   Ht 5' 8" (1 727 m)   Wt (!) 152 kg (334 lb 9 6 oz)   BMI 50 88 kg/m²     Physical Exam    Neurological Exam  On neurologic exam, the patient is alert and oriented to time and place  Speech is fluent and articulate, and the patient follows commands appropriately  Judgment and affect appear normal  Pupils are equally round and reactive to light and extraocular muscles are intact without nystagmus  Face is symmetric, and tongue, uvula, and palate are midline  Hearing is grossly intact  Motor examination reveals intact strength throughout  Normal gait is WB  She uses a cane in the R hand  Labs:      Lab Results   Component Value Date    WBC 10 92 (H) 09/23/2020    HGB 11 7 09/23/2020    HCT 38 1 09/23/2020    MCV 80 (L) 09/23/2020     09/23/2020     Lab Results   Component Value Date    HGBA1C 10 1 (H) 03/10/2021     Lab Results   Component Value Date    ALT 16 03/10/2021    AST 20 03/10/2021    ALKPHOS 116 03/10/2021    BILITOT 0 69 01/04/2016     Lab Results   Component Value Date    GLUCOSE 135 01/04/2016    CALCIUM 9 8 03/10/2021     01/04/2016    K 4 4 03/10/2021    CO2 32 (H) 03/10/2021    CL 97 03/10/2021    BUN 28 (H) 03/10/2021    CREATININE 1 26 (H) 03/10/2021         The following portions of the patient's history were reviewed and updated as appropriate: allergies, current medications, family history, past medical history, social history and active problem list   Review of systems was reviewed and otherwise unremarkable from a neurological perspective        I have spent 35 minutes with the patient today in which greater than 50% of this time was spent in counseling and/or coordination of care regarding diagnoses, test results, impression, plan and potential medication side effects  Mara Bernal  Neurology Department  [phone]

## 2021-03-17 ENCOUNTER — TELEPHONE (OUTPATIENT)
Dept: NEUROLOGY | Facility: CLINIC | Age: 61
End: 2021-03-17

## 2021-03-17 ENCOUNTER — OFFICE VISIT (OUTPATIENT)
Dept: NEUROSURGERY | Facility: CLINIC | Age: 61
End: 2021-03-17
Payer: MEDICARE

## 2021-03-17 VITALS
SYSTOLIC BLOOD PRESSURE: 180 MMHG | DIASTOLIC BLOOD PRESSURE: 88 MMHG | WEIGHT: 293 LBS | HEIGHT: 68 IN | TEMPERATURE: 98.8 F | RESPIRATION RATE: 16 BRPM | BODY MASS INDEX: 44.41 KG/M2 | HEART RATE: 99 BPM

## 2021-03-17 DIAGNOSIS — I67.1 LEFT CAVERNOUS CAROTID ANEURYSM: ICD-10-CM

## 2021-03-17 DIAGNOSIS — I10 BENIGN HYPERTENSION: Primary | ICD-10-CM

## 2021-03-17 PROCEDURE — 99214 OFFICE O/P EST MOD 30 MIN: CPT | Performed by: PHYSICIAN ASSISTANT

## 2021-03-17 NOTE — ASSESSMENT & PLAN NOTE
· Presents for 1 year follow up with CTA  · Left cavernous aneurysm  Imaging:   · CTA 3/12/2021:   Generalized parenchymal volume loss with mild cerebral microangiopathic disease  Stable 3-4 mm left mid cavernous and cavernous cave ICA aneurysms  Plan:   · Would recommend ongoing observation of carotid cave aneurysms at this time  Repeat CTA in 1 year  · Reviewed the natural course of aneurysms as well as the signs and symptoms to monitor for  · Based on ISUIA trial data, patient has a 5 year risk of rupture of 0%  · No indication for imaging sooner than 1 year at this time  · Continue surveillance presently  · Call with any questions or concerns

## 2021-03-17 NOTE — TELEPHONE ENCOUNTER
Called and spoke with Snehal Kraus at Dr Guido Faust office  Snehal Kraus states that patient hasn't been seen by their office since 5/22/2019  Asked Snehal Kraus if it is possible to fax over patient's last exam  Snehal Kraus states she will fax over the eye exam report  Spoke with [facility] [place] location regarding incoming fax from Dr Guido dolan  [facility] states she will scan document into patient charts once received       ----- Message from Lora Dunne PA-C sent at 3/16/2021  9:17 PM EDT -----  Regarding: eye exam  Can you try to get the last eye exam from Dr Guido Faust office  The pt states she had this in Sept Thanks

## 2021-03-17 NOTE — PATIENT INSTRUCTIONS
Nonruptured Cerebral Aneurysm   WHAT YOU NEED TO KNOW:   What is a cerebral aneurysm? A cerebral aneurysm is a bulging or weak area in an artery that brings blood to your brain  The area fills with blood and expands  The aneurysm can expand so much that blood bursts through the artery  This is a hemorrhagic stroke  An aneurysm that has not burst can be managed or treated to prevent it from rupturing  An aneurysm can happen anywhere in your brain  They most commonly form between the brain and the base of the skull  What increases my risk for a cerebral aneurysm? · An abnormally shaped artery wall    · High blood pressure    · Atherosclerosis (hardening of the arteries) or a vascular disease    · A head injury    · Cigarettes or illegal drugs    · A family history of brain aneurysms    · Being a woman or taking birth control pills    · A connective tissue disorder, polycystic kidney disease, or a circulatory disorder    · Elderly age    · An infection or tumor    What are the signs and symptoms of a nonruptured cerebral aneurysm? Cerebral aneurysms usually have no signs or symptoms if they have not ruptured  A large nonruptured aneurysm can press on nerves and cause symptoms  The following symptoms may mean the aneurysm is at risk of rupturing:  · A headache in one area    · Pain above and behind one eye    · Dilated pupil in one eye    · Vision changes or double vision    · Numb or weak feeling in your face, or not being able to move one side of your face    · Eyelid drooping    · Nausea or vomiting    How is a nonruptured cerebral aneurysm diagnosed? A nonruptured aneurysm is usually found during tests for another condition  Your healthcare provider may ask about your symptoms and if you have a family history of aneurysms  If you have 2 or more family members with aneurysms, you should be tested  Tell him about all medicines you take, including blood thinners    · An MRI  may be used to take pictures of weak or bulging areas in blood vessels  The pictures may show the size and location of the aneurysm  Do not enter the MRI room with anything metal  Metal can cause serious damage  Tell the healthcare provider if you have any metal in or on your body  · A computed tomography angiography (CTA) scan  may be used to take detailed pictures of the aneurysm  · An angiogram  is an x-ray picture of the blood vessels  Contrast liquid is used to help the blood vessels show up better in the pictures  Tell the healthcare provider if you have ever had an allergic reaction to contrast liquid  How is a nonruptured cerebral aneurysm treated? · An aneurysm that has not ruptured may not need to be treated  Your healthcare provider may check it regularly  He may order tests that check the size of your aneurysm  Treatment may be used to prevent a rupture if the aneurysm becomes large or you have symptoms  You may also need treatment if you have a family history of ruptured aneurysms  · The type of treatment depends on the size and location of the aneurysm  Your healthcare provider may fill the aneurysm with coils or other devices  He may move blood flow away from the aneurysm  You may need surgery to have a small metal clip placed around the base of the aneurysm  The clip stays in place permanently to keep blood out of the aneurysm  What can I do to manage a nonruptured cerebral aneurysm? A cerebral aneurysm cannot be prevented, but the following can help you lower the risk that it will rupture:  · Control high blood pressure  Keep your blood pressure at the level your healthcare provider recommends  If you are a woman, ask your healthcare provider if birth control pills are safe for you  Birth control pills can also increase blood pressure  · Do not smoke  Nicotine can damage blood vessels and make it more difficult to manage your blood pressure   Do not use e-cigarettes or smokeless tobacco in place of cigarettes or to help you quit  They still contain nicotine  Ask your healthcare provider for information if you currently smoke and need help quitting  · Do not drink alcohol or use illegal drugs  Alcohol and illegal drugs such as cocaine can increase your blood pressure  Ask your healthcare provider for information if you need help quitting  · Eat a variety of healthy foods  Healthy foods include fruits, vegetables, whole-grain breads, beans, lean meats, fish, and low-fat dairy products  Your healthcare provider or dietitian can help you create a meal plan to help control high blood pressure or cholesterol  You may also need to limit sodium (salt)  · Exercise as directed  Exercise can help control your blood pressure and cholesterol level  Ask your healthcare provider how much exercise you need each day and which exercises are best for you  Call 911 for any of the following:   · You have any of the following signs of a stroke:      ? Numbness or drooping on one side of your face     ? Weakness in an arm or leg    ? Confusion or difficulty speaking    ? Dizziness, a severe headache, or vision loss    · You lose consciousness or have a seizure  When should I seek immediate care? · You have a stiff neck or trouble walking  · You have nausea or are vomiting  · You have blurred or double vision, or you are sensitive to light  · You suddenly feel weak  When should I contact my healthcare provider? · You have questions or concerns about your condition or care  CARE AGREEMENT:   You have the right to help plan your care  Learn about your health condition and how it may be treated  Discuss treatment options with your healthcare providers to decide what care you want to receive  You always have the right to refuse treatment  The above information is an  only  It is not intended as medical advice for individual conditions or treatments   Talk to your doctor, nurse or pharmacist before following any medical regimen to see if it is safe and effective for you  © Copyright [address] Information is for End User's use only and may not be sold, redistributed or otherwise used for commercial purposes   All illustrations and images included in CareNotes® are the copyrighted property of A D A M , Inc  or [address] [place] [address]

## 2021-03-17 NOTE — PROGRESS NOTES
Neurosurgery Office Note  Pixie Flow 56 y o  female MRN: [id number]      Assessment/Plan     Left cavernous carotid aneurysm  · Presents for 1 year follow up with CTA  · Left cavernous aneurysm  Imaging:   · CTA 3/12/2021:   Generalized parenchymal volume loss with mild cerebral microangiopathic disease  Stable 3-4 mm left mid cavernous and cavernous cave ICA aneurysms  Plan:   · Would recommend ongoing observation of carotid cave aneurysms at this time  Repeat CTA in 1 year  · Reviewed the natural course of aneurysms as well as the signs and symptoms to monitor for  · Based on ISUIA trial data, patient has a 5 year risk of rupture of 0%  · No indication for imaging sooner than 1 year at this time  · Continue surveillance presently  · Call with any questions or concerns  Diagnoses and all orders for this visit:    Benign hypertension  -     BUN; Future  -     Creatinine, serum; Future    Left cavernous carotid aneurysm  -     CTA head w wo contrast; Future  -     BUN; Future  -     Creatinine, serum; Future            CHIEF COMPLAINT    Chief Complaint   Patient presents with    Follow-up       HISTORY    History of Present Illness     56y o  year old female     Very pleasant 33-year-old female, present today for follow-up CTA head for L carotid aneurysms  This was originally discovered when she presented to [facility] with complaint of eadache and multiple other complaints on 9/16/19 through 9/23/19  As part of her evaluation she underwent a CTA of her head 9/20/19 incidental note was made at that time of a left internal carotid artery aneurysm  She has no family history of aneurysmal disease, she has no smoking or drinking history  No illicit drug use  She does make note of a female sibling who passed away age 21 unknown cause, she reports autopsy was not performed  She has an additional 2 female siblings in 3 male sibling her parents are no longer alive      She does have a history of hypertension for which she follows with PCP and cardiology  She has an appointment with her cardiologist later today  She was hypertensive on presentation to the office  On repeat her pressure was 156/84 taken manually in her right arm while seated  She was following with Dr Gretchen Anguiano for ongoing care and management of her headaches  At visit today she offers no other complaints denies gait or balance disturbance, motor sensory difficulties in the upper lower extremities, bowel or bladder incontinence  She denies any changes in vision, trouble speech, swollen or injected eye, facial weakness, lightheadedness, dizziness  See Discussion    REVIEW OF SYSTEMS    Review of Systems   Constitutional: Negative  HENT: Negative  Eyes: Positive for visual disturbance (constant floaters, spots )  Left eye twitching   Respiratory: Negative  Cardiovascular: Negative  Gastrointestinal: Negative  Endocrine: Negative  Genitourinary: Negative  Musculoskeletal: Negative  Skin: Negative  Allergic/Immunologic: Negative  Neurological: Positive for dizziness, weakness (b/l legs at times), numbness (and tingling on right hand) and headaches  Hematological: Bruises/bleeds easily (on aspirin)  Psychiatric/Behavioral: Positive for decreased concentration (forgteful)  Negative for confusion  All other systems reviewed and are negative          Meds/Allergies     Current Outpatient Medications   Medication Sig Dispense Refill    albuterol (Proventil HFA) 90 mcg/act inhaler Inhale 2 puffs every 6 (six) hours as needed for wheezing For another 24 hours use every 4 hours standing 6 7 g 0    aspirin (ADULT ASPIRIN EC LOW STRENGTH) 81 mg EC tablet Take 81 mg by mouth daily       atorvastatin (LIPITOR) 40 mg tablet Take 1 tablet (40 mg total) by mouth daily 90 tablet 1    SARAH MICROLET LANCETS lancets Inject 1 applicator into the skin 4 (four) times a day      Blood Glucose Monitoring Suppl (Contour Next Monitor) w/Device KIT Disp 1 meter Dx; E11 9 1 kit 1    Blood Pressure Monitor KIT Check blood pressures BID 1 each 0    budesonide-formoterol (SYMBICORT) 80-4 5 MCG/ACT inhaler Inhale 2 puffs 2 (two) times a day Rinse mouth after use  1 Inhaler 5    Cholecalciferol (HM Vitamin D3) 100 MCG (4000 UT) CAPS 4000 units daily 60 capsule 5    Contour Next Test test strip Test 3x daily 300 each 3    Diclofenac Sodium POWD       diltiazem (CARDIZEM CD) 240 mg 24 hr capsule Take 2 capsules (480 mg total) by mouth daily 180 capsule 1    gabapentin (NEURONTIN) 100 mg capsule Take 1 capsule in am and 2-3 capsules at night 90 capsule 3    hydrochlorothiazide (HYDRODIURIL) 25 mg tablet Take 1 tablet (25 mg total) by mouth daily 90 tablet 1    Insulin Regular Human, Conc, (HumuLIN R U-500 KwikPen) 500 units/mL CONCENTRATED U-500 injection pen 110 units before breakfast, 60 units before dinner 12 pen 1    losartan (COZAAR) 100 MG tablet Take 1 tablet (100 mg total) by mouth daily 90 tablet 1    metFORMIN (GLUCOPHAGE-XR) 500 mg 24 hr tablet Take 2 tablets (1,000 mg total) by mouth 2 (two) times a day with meals 360 tablet 1    predniSONE 5 mg tablet Take 2 tablets (10 mg total) by mouth daily 180 tablet 0    topiramate (TOPAMAX) 100 mg tablet TAKE 1 TABLET BY MOUTH AT BEDTIME 90 tablet 1    ferrous sulfate 324 (65 Fe) mg Take 1 tablet (324 mg total) by mouth every other day (Patient not taking: Reported on 3/16/2021) 45 tablet 3    Riboflavin 400 MG TABS Take 1 tablet (400 mg total) by mouth daily (Patient not taking: Reported on 3/16/2021) 30 tablet 0     No current facility-administered medications for this visit          No Known Allergies    PAST HISTORY    Past Medical History:   Diagnosis Date    Anemia     Arthritis     Diabetes mellitus ([facility] [address] )     Dyspnea on exertion     Hyperlipidemia     Hypertension     Legally blind 2010    Mild intermittent asthma with exacerbation 8/4/2018    Miscarriage     x 3    Seizures (HCC)     Sickle cell trait ([facility] [address] [facility]     Sleep apnea     Thyroid nodule 3/6/2020       Past Surgical History:   Procedure Laterality Date    CATARACT EXTRACTION Bilateral     august and september    EYE SURGERY      HYSTERECTOMY      42    OOPHORECTOMY Left     39    TN TEMPORAL ARTERY LIGATN OR BX Bilateral 10/17/2019    Procedure: BIOPSY ARTERY TEMPORAL;  Surgeon: Peri Fajardo DO;  Location: [facility];  Service: Vascular    US GUIDED THYROID BIOPSY  5/13/2020       Social History     Tobacco Use    Smoking status: Never Smoker    Smokeless tobacco: Never Used   Substance Use Topics    Alcohol use: Never     Alcohol/week: 0 0 standard drinks     Frequency: Never     Drinks per session: Patient refused     Binge frequency: Never    Drug use: No       Family History   Problem Relation Age of Onset    Heart attack Mother     Heart disease Mother     Hypertension Mother     No Known Problems Father     Heart disease Sister     No Known Problems Brother     No Known Problems Son     No Known Problems Daughter     Heart attack Maternal Grandmother     Heart disease Maternal Grandmother     Diabetes Other     Heart attack Other     No Known Problems Sister     No Known Problems Sister     No Known Problems Paternal Aunt     No Known Problems Son     Cancer Neg Hx     Stroke Neg Hx          Above history personally reviewed  EXAM    Vitals:Blood pressure (!) 180/88, pulse 99, temperature 98 8 °F (37 1 °C), temperature source Tympanic, resp  rate 16, height 5' 8" (1 727 m), weight (!) 152 kg (334 lb), not currently breastfeeding  ,Body mass index is 50 78 kg/m²  Physical Exam  Constitutional:       Appearance: She is well-developed  HENT:      Head: Normocephalic and atraumatic  Eyes:      General: No scleral icterus       Extraocular Movements: Extraocular movements intact and EOM normal       Conjunctiva/sclera: Conjunctivae normal  Pupils: Pupils are equal, round, and reactive to light  Neck:      Musculoskeletal: Normal range of motion and neck supple  Vascular: No JVD  Trachea: No tracheal deviation  Cardiovascular:      Rate and Rhythm: Normal rate  Pulmonary:      Effort: Pulmonary effort is normal  No respiratory distress  Abdominal:      General: There is no distension  Palpations: Abdomen is soft  Musculoskeletal: Normal range of motion  General: No deformity  Skin:     General: Skin is warm and dry  Neurological:      General: No focal deficit present  Mental Status: She is alert and oriented to person, place, and time  Cranial Nerves: No cranial nerve deficit  Sensory: No sensory deficit  Motor: No weakness  Gait: Gait is intact  Deep Tendon Reflexes:      Reflex Scores:       Bicep reflexes are 1+ on the right side and 1+ on the left side  Brachioradialis reflexes are 1+ on the right side and 1+ on the left side  Patellar reflexes are 1+ on the right side and 1+ on the left side  Psychiatric:         Speech: Speech normal          Behavior: Behavior normal          Thought Content: Thought content normal          Neurologic Exam     Mental Status   Oriented to person, place, and time  Attention: normal    Speech: speech is normal   Level of consciousness: alert  Knowledge: good  Normal comprehension  Cranial Nerves     CN III, IV, VI   Pupils are equal, round, and reactive to light  Extraocular motions are normal    Upgaze: normal  Downgaze: normal    CN VII   Facial expression full, symmetric       CN VIII   CN VIII normal    Hearing: intact    CN IX, X   CN IX normal    Palate: symmetric    CN XII   CN XII normal    Tongue deviation: none    Motor Exam   Muscle bulk: normal  Right arm tone: normal  Left arm tone: normal  Right leg tone: normal  Left leg tone: normal    Strength   Right deltoid: 5/5  Left deltoid: 5/5  Right biceps: 5/5  Left biceps: 5/5  Right triceps: 5/5  Left triceps: 5/5  Right wrist flexion: 5/5  Left wrist flexion: 5/5  Right wrist extension: 5/5  Left wrist extension: 5/5  Right iliopsoas: 5/5  Left iliopsoas: 5/5  Right quadriceps: 5/5  Left quadriceps: 5/5  Right hamstrin/5  Left hamstrin/5  Right anterior tibial: 5/5  Left anterior tibial: 5/5  Right gastroc: 5/5  Left gastroc: 5/5    Sensory Exam   Light touch normal      Gait, Coordination, and Reflexes     Gait  Gait: normal    Tremor   Resting tremor: absent  Action tremor: absent    Reflexes   Right brachioradialis: 1+  Left brachioradialis: 1+  Right biceps: 1+  Left biceps: 1+  Right patellar: 1+  Left patellar: 1+        MEDICAL DECISION MAKING    Imaging Studies:     Cta Head W Wo Contrast    Result Date: 3/17/2021  Narrative: CTA BRAIN - WITH AND WITHOUT CONTRAST INDICATION: Follow-up of cerebral aneurysm  COMPARISON:   Head CT and CT angiography of the head and neck from 2020  TECHNIQUE:  Routine noncontrast CT brain followed by axial 0 625 mm imaging after administration of intravenous contrast   MIP and 3D reconstructions performed  3D rendering was performed on an independent workstation  Radiation dose length product (DLP) for this visit:  1509 mGy-cm   This examination, like all CT scans performed in the [facility], was performed utilizing techniques to minimize radiation dose exposure, including the use of iterative reconstruction and automated exposure control  IV Contrast:  85 mL of iohexol (OMNIPAQUE)  IMAGE QUALITY:  Diagnostic  FINDINGS: NONCONTRAST BRAIN:  PARENCHYMA:  No intracranial mass, mass effect or midline shift  No CT signs of acute infarction  No acute parenchymal hemorrhage  Mild periventricular and deep cerebral white matter chronic microangiopathic disease with mild intracranial carotid artery atherosclerotic calcifications    Generalized parenchymal volume loss, with prominent sulci in the frontoparietal regions and enlarged sylvian fissures  VENTRICLES AND EXTRA-AXIAL SPACES:  Ventricles and extra-axial CSF spaces are prominent commensurate with the degree of volume loss  No hydrocephalus  No acute extra-axial hemorrhage  VISUALIZED ORBITS AND PARANASAL SINUSES:  Unremarkable  CALVARIUM/SCALP: There are surgical clips noted in the bilateral preauricular temporal soft tissues  These likely represent ligation clips for treatment of temporal arteritis  VASCULATURE: DISTAL INTERNAL CAROTID ARTERIES:  The right cervical and intracranial ICA is smaller in caliber than the left without a dissection flap  A laterally directed aneurysm is noted to arise from the left cavernous ICA, measuring 3-4 mm image 189 of series 5  Additional, previously mentioned medially and inferiorly projecting aneurysm at the left cavernous and supraclinoid ICA junction measures 3 to 4 mm also on image 188 of series 5  Both of these aneurysms are stable in appearance when compared to the prior study  Mild atherosclerotic stenosis is noted in the right parminder cavernous and proximal supraclinoid ICA  The carotid termini are unremarkable  ANTERIOR CIRCULATION:  Aplastic right horizontal RAUL  The vertical ACAs are supplied by the left horizontal RAUL  No stenosis in the visualized proximal RAUL branches  MIDDLE CEREBRAL ARTERY CIRCULATION:  M1 segment and middle cerebral artery branches demonstrate normal enhancement bilaterally  DISTAL VERTEBRAL ARTERIES:  Normal distal vertebral arteries  Posterior inferior cerebellar artery origins are normal  Normal vertebral basilar junction  BASILAR ARTERY:  Basilar artery is normal in caliber  Normal superior cerebellar arteries  POSTERIOR CEREBRAL ARTERIES: Both posterior cerebral arteries arises from the basilar tip  Both arteries demonstrate normal enhancement  DURAL VENOUS SINUSES:  Normal  BONY STRUCTURES:  Focal exostosis arising from the left anterior parietal outer table  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

## 2021-03-18 ENCOUNTER — TELEPHONE (OUTPATIENT)
Dept: FAMILY MEDICINE CLINIC | Facility: CLINIC | Age: 61
End: 2021-03-18

## 2021-03-18 DIAGNOSIS — IMO0002 DIABETES MELLITUS TYPE 2 WITH COMPLICATIONS, UNCONTROLLED: Primary | ICD-10-CM

## 2021-03-18 RX ORDER — PEN NEEDLE, DIABETIC 32GX 5/32"
NEEDLE, DISPOSABLE MISCELLANEOUS 2 TIMES DAILY
Qty: 200 EACH | Refills: 1 | Status: SHIPPED | OUTPATIENT
Start: 2021-03-18

## 2021-03-18 NOTE — TELEPHONE ENCOUNTER
SIGNATURES NEEDED FOR [employer] [facility] FORM RECEIVED VIA FAX  WILL BE PLACED IN YOUR BIN AT ASSIGNED DELIVERY TIMES      [id number]

## 2021-03-22 ENCOUNTER — PATIENT OUTREACH (OUTPATIENT)
Dept: FAMILY MEDICINE CLINIC | Facility: CLINIC | Age: 61
End: 2021-03-22

## 2021-03-22 ENCOUNTER — HOSPITAL ENCOUNTER (OUTPATIENT)
Dept: NEUROLOGY | Facility: CLINIC | Age: 61
Discharge: HOME | End: 2021-03-22
Payer: MEDICARE

## 2021-03-22 DIAGNOSIS — G56.91 NEUROPATHY OF HAND, RIGHT: ICD-10-CM

## 2021-03-22 PROCEDURE — 95886 MUSC TEST DONE W/N TEST COMP: CPT | Performed by: PSYCHIATRY & NEUROLOGY

## 2021-03-22 PROCEDURE — 95909 NRV CNDJ TST 5-6 STUDIES: CPT | Performed by: PSYCHIATRY & NEUROLOGY

## 2021-03-22 NOTE — PROGRESS NOTES
The patient returned my call  She states things are better now than they have been since she finally received her insulin  The patient notes it took over 2 weeks to receive the mail order meds  She notes she has been keeping a strict watch on her diet, so much so she has been having hypoglycemic episodes  The patient reports she awoke this morning sweating and her sugar was 65  She does note most fasting sugars average about 100 if she has a snack prior to bed  I suggested she continue having a snack before bed to carry her through to the morning without any lows  The patient notes she has been doing leg exercises 3 times a day as well as doing much better going up and down the stairs  I congratulated her on this and encouraged her to continue  The patient states she is having a tough time financially and difficulty paying her bills  She notes she cannot get any assistance because her 's finances are taken in to account  The patient is aware of her Neuro appointment this afternoon and [employer] is providing transportation  The patient is scheduled for her first Covid vaccine tomorrow and plans on walking to [facility] to receive it  The patient is also aware of her Endo appointment Friday, 3/26  I suggested she ask their office if they can provide any assistance with her insulin or other medications  I will continue to follow

## 2021-03-22 NOTE — PROGRESS NOTES
I called the patient but received voicemail    Message was left stating I will call her another time as well as reminding her of her Neuro appointment today at 1pm

## 2021-03-23 ENCOUNTER — IMMUNIZATIONS (OUTPATIENT)
Dept: FAMILY MEDICINE CLINIC | Facility: HOSPITAL | Age: 61
End: 2021-03-23

## 2021-03-23 DIAGNOSIS — Z23 ENCOUNTER FOR IMMUNIZATION: Primary | ICD-10-CM

## 2021-03-23 DIAGNOSIS — I10 ESSENTIAL HYPERTENSION: ICD-10-CM

## 2021-03-23 PROCEDURE — 0011A SARS-COV-2 / COVID-19 MRNA VACCINE (MODERNA) 100 MCG: CPT

## 2021-03-23 PROCEDURE — 91301 SARS-COV-2 / COVID-19 MRNA VACCINE (MODERNA) 100 MCG: CPT

## 2021-03-23 RX ORDER — DILTIAZEM HYDROCHLORIDE 240 MG/1
480 CAPSULE, COATED, EXTENDED RELEASE ORAL DAILY
Qty: 180 CAPSULE | Refills: 1 | Status: SHIPPED | OUTPATIENT
Start: 2021-03-23 | End: 2021-03-23 | Stop reason: SDUPTHER

## 2021-03-23 RX ORDER — DILTIAZEM HYDROCHLORIDE 240 MG/1
480 CAPSULE, COATED, EXTENDED RELEASE ORAL DAILY
Qty: 180 CAPSULE | Refills: 1 | Status: SHIPPED | OUTPATIENT
Start: 2021-03-23 | End: 2021-09-28 | Stop reason: SDUPTHER

## 2021-03-26 ENCOUNTER — OFFICE VISIT (OUTPATIENT)
Dept: ENDOCRINOLOGY | Facility: CLINIC | Age: 61
End: 2021-03-26
Payer: MEDICARE

## 2021-03-26 VITALS
HEIGHT: 68 IN | DIASTOLIC BLOOD PRESSURE: 84 MMHG | BODY MASS INDEX: 44.41 KG/M2 | SYSTOLIC BLOOD PRESSURE: 122 MMHG | WEIGHT: 293 LBS | HEART RATE: 92 BPM

## 2021-03-26 DIAGNOSIS — E55.9 VITAMIN D DEFICIENCY: ICD-10-CM

## 2021-03-26 DIAGNOSIS — E78.5 HYPERLIPIDEMIA, UNSPECIFIED HYPERLIPIDEMIA TYPE: ICD-10-CM

## 2021-03-26 DIAGNOSIS — E04.1 THYROID NODULE: ICD-10-CM

## 2021-03-26 DIAGNOSIS — IMO0002 DIABETES MELLITUS TYPE 2 WITH COMPLICATIONS, UNCONTROLLED: Primary | ICD-10-CM

## 2021-03-26 DIAGNOSIS — I10 BENIGN HYPERTENSION: ICD-10-CM

## 2021-03-26 PROCEDURE — 99215 OFFICE O/P EST HI 40 MIN: CPT | Performed by: PHYSICIAN ASSISTANT

## 2021-03-26 RX ORDER — INSULIN HUMAN 500 [IU]/ML
INJECTION, SOLUTION SUBCUTANEOUS
Qty: 12 PEN | Refills: 1
Start: 2021-03-26 | End: 2021-05-18 | Stop reason: SDUPTHER

## 2021-03-26 NOTE — ASSESSMENT & PLAN NOTE
A1C remains high at 10 1 however she was off her insulin for about two weeks  Over the past two weeks she has been taking insulin ad directed and making improvements to diet and she has been having frequent hypoglycemia  Advised her to Reduce Humulin R U500 to 100 units before breakfast and 45 units before supper  Check BG at least 3x per day and send log to office in 1 week or let us know ASAP if she continues to have hypoglycemia  Discussed risk of hypoglycemia and importance of calling the office between visits if she is experiencing hypoglycemia on a regular basis or having difficulty obtaining her medication  Creatinine slightly higher on recent labs likely due to hyperglycemia while off insulin  Will monitor with repeat testing before next visit in 6 weeks     Lab Results   Component Value Date    HGBA1C 10 1 (H) 03/10/2021

## 2021-03-26 NOTE — PROGRESS NOTES
Established Patient Progress Note      Chief Complaint   Patient presents with    Diabetes Type 2        History of Present Illness:   Jair Hernandez is a 56 y o  female with a history of type 2 diabetes with long term use of insulin since many years ago  Reports complications of retinopathy, neuropathy, and microalbuminuria  She was off insulin for about two weeks due to not being able to get medication  Now that she is back on medication and trying to follow a healthier diet she is having frequent hypoglycemia overnight/early AM   She has occasional lows during the day if she misses or delays a meal   She has a snack at bedtime to prevent lows  She is checking BG on average 1 9x per day BG range   Many readings in target with frequent morning lows  She continues to take prednisone 10mg per day, had to many aches when it was reduced to 5mg  She has met with dietician and now interested in follow up  She is prioritizing her health  Current regimen:   Humulin R U500  110 before breakfast  60 before dinner  Metformin    Last Eye Exam: UTD  Last Foot Exam: UTD    Has hypertension: Taking cardizem, HCTZ, losartan  Has hyperlipidemia: Taking atorvastatin    Thyroid disorders: Thryroid nodule- FNA 5/2020 showed no malignancy  Off Iron for a few months       Patient Active Problem List   Diagnosis    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, with long-term current use of insulin (HCC)    Benign hypertension    Seizures (HCC)    Exertional dyspnea    Enlarged pulmonary artery (HCC)    Aortic dilatation (HCC)    Anemia    Decreased pulses in feet    Diabetes mellitus type 2 with complications, uncontrolled ([address] )    Hyperlipidemia    Microalbuminuria    Obstructive sleep apnea    Class 3 severe obesity with serious comorbidity and body mass index (BMI) of 45 0 to 49 9 in adult [facility])    Neuropathy of hand, right    Prolonged QT interval    Visual impairment in both eyes    Vitamin D deficiency    Lung nodules    Orthostatic hypotension    Mild intermittent asthma with exacerbation    Type 2 diabetes mellitus with microalbuminuria, with long-term current use of insulin ([facility])    Frequent headaches    Other inflammatory and immune myopathies, not elsewhere classified    Transaminitis    Morbid obesity ([facility]    Polyneuropathy associated with underlying disease ([facility] )    PMR (polymyalgia rheumatica) ([facility])    Thrombocytosis ([facility]    Left cavernous carotid aneurysm    Type 2 diabetes mellitus with hyperglycemia, with long-term current use of insulin ([facility])    Aneurysm ([facility])    Thyroid nodule    History of absence seizures    Hypersomnia    Migraine without aura and without status migrainosus, not intractable    Cerebral aneurysm without rupture    Chronic migraine without aura without status migrainosus, not intractable    Diabetes mellitus ([address] )    Essential hypertension    Depressed mood    Blurry vision, bilateral    Ulnar neuropathy of right upper extremity      Past Medical History:   Diagnosis Date    Anemia     Arthritis     Diabetes mellitus ([facility])     Dyspnea on exertion     Hyperlipidemia     Hypertension     Legally blind 2010    Mild intermittent asthma with exacerbation 8/4/2018    Miscarriage     x 3    Seizures ([facility])     Sickle cell trait ([facility] )     Sleep apnea     Thyroid nodule 3/6/2020      Past Surgical History:   Procedure Laterality Date    CATARACT EXTRACTION Bilateral     august and september    EYE SURGERY      HYSTERECTOMY      39    OOPHORECTOMY Left     39    PA TEMPORAL ARTERY LIGATN OR BX Bilateral 10/17/2019    Procedure: BIOPSY ARTERY TEMPORAL;  Surgeon: Whitney Warner DO;  [facility]: [facility];  Service: Vascular    US GUIDED THYROID BIOPSY  5/13/2020      Family History   Problem Relation Age of Onset    Heart attack Mother     Heart disease Mother     Hypertension Mother     No Known Problems Father     Heart disease Sister     No Known Problems Brother     No Known Problems Son     No Known Problems Daughter     Heart attack Maternal Grandmother     Heart disease Maternal Grandmother     Diabetes Other     Heart attack Other     No Known Problems Sister     No Known Problems Sister     No Known Problems Paternal Aunt     No Known Problems Son     Cancer Neg Hx     Stroke Neg Hx      Social History     Tobacco Use    Smoking status: Never Smoker    Smokeless tobacco: Never Used   Substance Use Topics    Alcohol use: Never     Alcohol/week: 0 0 standard drinks     Frequency: Never     Drinks per session: Patient refused     Binge frequency: Never     No Known Allergies      Current Outpatient Medications:     albuterol (Proventil HFA) 90 mcg/act inhaler, Inhale 2 puffs every 6 (six) hours as needed for wheezing For another 24 hours use every 4 hours standing, Disp: 6 7 g, Rfl: 0    aspirin (ADULT ASPIRIN EC LOW STRENGTH) 81 mg EC tablet, Take 81 mg by mouth daily , Disp: , Rfl:     atorvastatin (LIPITOR) 40 mg tablet, Take 1 tablet (40 mg total) by mouth daily, Disp: 90 tablet, Rfl: 1    [employer] MICROLET LANCETS lancets, Inject 1 applicator into the skin 4 (four) times a day, Disp: , Rfl:     Blood Glucose Monitoring Suppl (Contour Next Monitor) w/Device KIT, Disp 1 meter Dx; E11 9, Disp: 1 kit, Rfl: 1    Blood Pressure Monitor KIT, Check blood pressures BID, Disp: 1 each, Rfl: 0    budesonide-formoterol (SYMBICORT) 80-4 5 MCG/ACT inhaler, Inhale 2 puffs 2 (two) times a day Rinse mouth after use , Disp: 1 Inhaler, Rfl: 5    Cholecalciferol (HM Vitamin D3) 100 MCG (4000 UT) CAPS, 4000 units daily, Disp: 60 capsule, Rfl: 5    Contour Next Test test strip, Test 3x daily, Disp: 300 each, Rfl: 3    diltiazem (CARDIZEM CD) 240 mg 24 hr capsule, Take 2 capsules (480 mg total) by mouth daily, Disp: 180 capsule, Rfl: 1    gabapentin (NEURONTIN) 100 mg capsule, Take 1 capsule in am and 2-3 capsules at night, Disp: 90 capsule, Rfl: 3    hydrochlorothiazide (HYDRODIURIL) 25 mg tablet, Take 1 tablet (25 mg total) by mouth daily, Disp: 90 tablet, Rfl: 1    Insulin Pen Needle (BD Pen Needle Tita U/F) 32G X 4 MM MISC, Use 2 (two) times a day, Disp: 200 each, Rfl: 1    Insulin Regular Human, Conc, (HumuLIN R U-500 KwikPen) 500 units/mL CONCENTRATED U-500 injection pen, 100 units before breakfast, 45 units before dinner, Disp: 12 pen, Rfl: 1    losartan (COZAAR) 100 MG tablet, Take 1 tablet (100 mg total) by mouth daily, Disp: 90 tablet, Rfl: 1    metFORMIN (GLUCOPHAGE-XR) 500 mg 24 hr tablet, Take 2 tablets (1,000 mg total) by mouth 2 (two) times a day with meals, Disp: 360 tablet, Rfl: 1    predniSONE 5 mg tablet, Take 2 tablets (10 mg total) by mouth daily, Disp: 180 tablet, Rfl: 0    Riboflavin 400 MG TABS, Take 1 tablet (400 mg total) by mouth daily (Patient taking differently: Take 400 mg by mouth daily (B2)), Disp: 30 tablet, Rfl: 0    topiramate (TOPAMAX) 100 mg tablet, TAKE 1 TABLET BY MOUTH AT BEDTIME, Disp: 90 tablet, Rfl: 1    Diclofenac Sodium POWD, , Disp: , Rfl:     ferrous sulfate 324 (65 Fe) mg, Take 1 tablet (324 mg total) by mouth every other day (Patient not taking: Reported on 3/16/2021), Disp: 45 tablet, Rfl: 3    Review of Systems   Constitutional: Positive for fatigue  Negative for activity change, appetite change, chills, diaphoresis, fever and unexpected weight change  HENT: Negative for trouble swallowing and voice change  Eyes: Negative for visual disturbance  Respiratory: Positive for shortness of breath  Cardiovascular: Positive for leg swelling  Negative for chest pain and palpitations  Gastrointestinal: Negative for abdominal pain, constipation and diarrhea  Endocrine: Negative for cold intolerance, heat intolerance, polydipsia, polyphagia and polyuria  Genitourinary: Negative for frequency and menstrual problem     Musculoskeletal: Positive for arthralgias and gait problem (using cane  )  Negative for myalgias  Skin: Negative for rash  Allergic/Immunologic: Negative for food allergies  Neurological: Negative for dizziness and tremors  Hematological: Negative for adenopathy  Psychiatric/Behavioral: Negative for sleep disturbance  All other systems reviewed and are negative  Physical Exam:  Body mass index is 52 18 kg/m²  /84 (BP Location: Left arm, Patient Position: Sitting, Cuff Size: Large)   Pulse 92   Ht 5' 8" (1 727 m)   Wt (!) 156 kg (343 lb 3 oz)   BMI 52 18 kg/m²    Wt Readings from Last 3 Encounters:   03/26/21 (!) 156 kg (343 lb 3 oz)   03/17/21 (!) 152 kg (334 lb)   03/16/21 (!) 152 kg (334 lb 9 6 oz)       Physical Exam  Vitals signs reviewed  Constitutional:       General: She is not in acute distress  Appearance: She is well-developed  HENT:      Head: Normocephalic and atraumatic  Eyes:      Conjunctiva/sclera: Conjunctivae normal       Pupils: Pupils are equal, round, and reactive to light  Neck:      Musculoskeletal: Normal range of motion and neck supple  Thyroid: No thyromegaly  Cardiovascular:      Rate and Rhythm: Normal rate and regular rhythm  Heart sounds: Normal heart sounds  Pulmonary:      Effort: Pulmonary effort is normal  No respiratory distress  Breath sounds: Normal breath sounds  No wheezing or rales  Abdominal:      General: Bowel sounds are normal  There is no distension  Palpations: Abdomen is soft  Tenderness: There is no abdominal tenderness  Musculoskeletal: Normal range of motion  Right lower leg: Edema present  Left lower leg: Edema present  Skin:     General: Skin is warm and dry  Neurological:      Mental Status: She is alert and oriented to person, place, and time           Labs:   Lab Results   Component Value Date    HGBA1C 10 1 (H) 03/10/2021    HGBA1C 12 0 (H) 05/04/2020    HGBA1C 9 3 (H) 12/06/2019     Lab Results Component Value Date    CREATININE 1 26 (H) 03/10/2021    CREATININE 1 04 05/04/2020    CREATININE 0 94 03/06/2020    BUN 28 (H) 03/10/2021     01/04/2016    K 4 4 03/10/2021    CL 97 03/10/2021    CO2 32 (H) 03/10/2021     eGFR   Date Value Ref Range Status   03/10/2021 53 (L) >60 ml/min/1 73sq m Final     Lab Results   Component Value Date    CHOL 130 10/08/2015    HDL 51 05/04/2020    TRIG 97 05/04/2020     Lab Results   Component Value Date    ALT 16 03/10/2021    AST 20 03/10/2021    ALKPHOS 116 03/10/2021    BILITOT 0 69 01/04/2016     Lab Results   Component Value Date    SRM0NKRHMDWT 0 737 03/10/2021    WXR5KFPHNVAD 1 820 05/04/2020    LLM7VKBSEJEF 0 736 12/06/2019     Lab Results   Component Value Date    FREET4 0 88 03/10/2021       Impression & Plan:    Problem List Items Addressed This Visit        Endocrine    Diabetes mellitus type 2 with complications, uncontrolled ([address] ) - Primary     A1C remains high at 10 1 however she was off her insulin for about two weeks  Over the past two weeks she has been taking insulin ad directed and making improvements to diet and she has been having frequent hypoglycemia  Advised her to Reduce Humulin R U500 to 100 units before breakfast and 45 units before supper  Check BG at least 3x per day and send log to office in 1 week or let us know ASAP if she continues to have hypoglycemia  Discussed risk of hypoglycemia and importance of calling the office between visits if she is experiencing hypoglycemia on a regular basis or having difficulty obtaining her medication  Creatinine slightly higher on recent labs likely due to hyperglycemia while off insulin  Will monitor with repeat testing before next visit in 6 weeks     Lab Results   Component Value Date    HGBA1C 10 1 (H) 03/10/2021            Relevant Medications    Insulin Regular Human, Conc, (HumuLIN R U-500 KwikPen) 500 units/mL CONCENTRATED U-500 injection pen    Other Relevant Orders    Ambulatory referral to Diabetic Education    Comprehensive metabolic panel    Lipid Panel with Direct LDL reflex    Thyroid nodule     Ordered ultrasound for continued monitoring of nodule  FNA 5/2020 showed no malignancy  Relevant Orders    US thyroid       Cardiovascular and Mediastinum    Benign hypertension     Stable on current regimen  Other    Hyperlipidemia     Continue atorvastatin  Check CMP/Fasting lipid panel before next visit  Relevant Orders    Comprehensive metabolic panel    Lipid Panel with Direct LDL reflex    Vitamin D deficiency     Continue supplements  Orders Placed This Encounter   Procedures    US thyroid     Standing Status:   Future     Standing Expiration Date:   3/26/2022     Scheduling Instructions:      No prep required  Please bring your physician order, insurance cards, a form of photo ID and a list of your medications with you  Arrive 15 minutes prior to your appointment time in order to register  To schedule this appointment, please contact central scheduling at [phone]     Order Specific Question:   Reason for Exam:     Answer:   Thyroid disorder    Comprehensive metabolic panel     This is a patient instruction: Patient fasting for 8 hours or longer recommended  Standing Status:   Future     Standing Expiration Date:   3/26/2022    Lipid Panel with Direct LDL reflex     This is a patient instruction: This test requires patient fasting for 10-12 hours or longer  Drinking of black coffee or black tea is acceptable       Standing Status:   Future     Standing Expiration Date:   3/26/2022    Ambulatory referral to Diabetic Education     Standing Status:   Future     Standing Expiration Date:   3/26/2022     Referral Priority:   Routine     Referral Type:   Consult - AMB     Referral Reason:   Specialty Services Required     Requested Specialty:   Diabetes Services     Number of Visits Requested:   1     Expiration Date: 3/26/2022       Patient Instructions   100 units before breakfast  45 units before supper        Discussed with the patient and all questioned fully answered  She will call me if any problems arise  Follow-up appointment in 3 months       Counseled patient on diagnostic results, prognosis, risk and benefit of treatment options, instruction for management, importance of treatment compliance, Risk  factor reduction and impressions    Ajay Espinal PA-C

## 2021-04-02 ENCOUNTER — TELEPHONE (OUTPATIENT)
Dept: FAMILY MEDICINE CLINIC | Facility: CLINIC | Age: 61
End: 2021-04-02

## 2021-04-02 NOTE — TELEPHONE ENCOUNTER
Form was dropped by patient, will be forwarded to provider at assigned delivery time  Pr dropped off attending physician statement to be filled out by PCP Anita Doty

## 2021-04-05 ENCOUNTER — TELEMEDICINE (OUTPATIENT)
Dept: DIABETES SERVICES | Facility: CLINIC | Age: 61
End: 2021-04-05
Payer: MEDICARE

## 2021-04-05 DIAGNOSIS — IMO0002 DIABETES MELLITUS TYPE 2 WITH COMPLICATIONS, UNCONTROLLED: Primary | ICD-10-CM

## 2021-04-05 DIAGNOSIS — E11.42 DIABETIC POLYNEUROPATHY ASSOCIATED WITH TYPE 2 DIABETES MELLITUS (HCC): ICD-10-CM

## 2021-04-05 PROCEDURE — 97803 MED NUTRITION INDIV SUBSEQ: CPT | Performed by: DIETITIAN, REGISTERED

## 2021-04-05 RX ORDER — GABAPENTIN 100 MG/1
CAPSULE ORAL
Qty: 90 CAPSULE | Refills: 3 | Status: SHIPPED | OUTPATIENT
Start: 2021-04-05 | End: 2021-11-09 | Stop reason: SDUPTHER

## 2021-04-05 NOTE — PATIENT INSTRUCTIONS
1  Keep daily food logs on computer  2  Avoid all sweetened beverages including juice  3  45 grams of carbs per meal and 15 grams per snack  4  Take insulin as prescribed

## 2021-04-05 NOTE — PROGRESS NOTES
Medical Nutrition Therapy        Assessment    Visit Type: Follow-up visit  Chief complaint/Medical Diagnosis/reason for visit E11 39 (T2DM with ophthalmic complications, with long term insulin)    HPI Tamanna Juan was seen for a virtual follow-up MNT visit today  Patient reports that she continues to struggle with carbohydrate intake, but she is in a better mental state/has embraced including carbs into her meals  Tamanna Juan reports running out of her insulin and did not take it for several weeks as she had trouble getting it  Patient reports trying to control her BG via her diet (not eating much) during that time, but she could not get it below 250-300  Consumption of the smallest amount of carbohydrate caused her BG to go into the 500's  Tamanna Juan complains of issues with her hands and her legs  She has difficulty writing and has been unable to keep food records  She has been unable to exercise secondary to difficulty standing up  Tamanna Juan has been doing seated exercises for her arms and her legs, though  She feels that she is being more disciplined with her food intake and is not snacking between meals  She was drinking juice, but stopped after she saw how it affected her blood sugars  Bushra Ogden agreed to work on the follow goals:1  Keep daily food logs using her computer 2  Avoid all sweetened beverages including juice  3  45 grams of carbs per meal and 15 grams per snack 4  Take insulin as prescribed  RD will remain available for further dietary questions/concerns       Ht Readings from Last 1 Encounters:   03/26/21 5' 8" (1 727 m)     Wt Readings from Last 3 Encounters:   03/26/21 (!) 156 kg (343 lb 3 oz)   03/17/21 (!) 152 kg (334 lb)   03/16/21 (!) 152 kg (334 lb 9 6 oz)     Intervention: behavior modification strategies, carbohydrate counting and Medication     Treatment Goals: Patient will monitor food intake daily with tracker, Patient will consume 3 meals a day, Patient will count carbohydrates and Medication    Monitoring and evaluation:    Term code indicator  FH 1 3 2 Food Intake Criteria: Keep daily food logs on computer  Term code indicator  FH 1 3 1 Fluid/Beverage Intake Criteria: Avoid all sweetened beverages including juice  Term code indicator  FH 1 6 3 Carbohydrate Intake Criteria: 45 grams of carbs per meal and 15 grams per snack  Term code indicator  CH 2 2 Treatments/Therapy/Alternative Medicine Criteria: Take insulin as prescribed  Patients Response to Instruction:  Vlad Blackburn  Expected Compliancegood    Start- Stop: 1:00-1:30  Total Minutes: 30 Minutes  Group or Individual Instruction: MNT-I  Other: Dr Herberth Saxena    Thank you for coming to the [facility] for education today  Please feel free to call with any questions or concerns      Villa Alejandro  [address] [place] [phone]

## 2021-04-20 ENCOUNTER — PATIENT OUTREACH (OUTPATIENT)
Dept: FAMILY MEDICINE CLINIC | Facility: CLINIC | Age: 61
End: 2021-04-20

## 2021-04-20 NOTE — PROGRESS NOTES
Called the patient to follow up but received voicemail  Message was left stating I will call her back

## 2021-04-21 ENCOUNTER — PATIENT OUTREACH (OUTPATIENT)
Dept: FAMILY MEDICINE CLINIC | Facility: CLINIC | Age: 61
End: 2021-04-21

## 2021-04-21 NOTE — PROGRESS NOTES
I called the patient to follow up; I awoke her from sleeping  She states her blood sugars are much better with fasting readings in the 100's  She notes she will get numbers >200 during the daytime  She reports she still occasionally gets some hypoglycemic episodes  She usually snacks before bed to avoid lows during the night or early morning  The patient states she continues to exercise  She notes she is "moving better" and makes "better choices" in regard to her diet  The patient states she finally has all her medications  She notes she will be running out of steroids over the weekend and plans on calling Rheum for a refill  The patient is aware she is due for labs for her specialists and plans on having them drawn next month  She is aware of her second Covid vaccine scheduled for tomorrow as well as her Rheum and Cards appointments next week  I will continue to follow

## 2021-04-22 ENCOUNTER — IMMUNIZATIONS (OUTPATIENT)
Dept: FAMILY MEDICINE CLINIC | Facility: HOSPITAL | Age: 61
End: 2021-04-22

## 2021-04-22 ENCOUNTER — TELEPHONE (OUTPATIENT)
Dept: OBGYN CLINIC | Facility: HOSPITAL | Age: 61
End: 2021-04-22

## 2021-04-22 DIAGNOSIS — M35.3 PMR (POLYMYALGIA RHEUMATICA) (HCC): ICD-10-CM

## 2021-04-22 DIAGNOSIS — Z23 ENCOUNTER FOR IMMUNIZATION: Primary | ICD-10-CM

## 2021-04-22 PROCEDURE — 91301 SARS-COV-2 / COVID-19 MRNA VACCINE (MODERNA) 100 MCG: CPT

## 2021-04-22 PROCEDURE — 0012A SARS-COV-2 / COVID-19 MRNA VACCINE (MODERNA) 100 MCG: CPT

## 2021-04-22 RX ORDER — PREDNISONE 1 MG/1
TABLET ORAL
Qty: 180 TABLET | Refills: 0 | Status: SHIPPED | OUTPATIENT
Start: 2021-04-22 | End: 2021-07-28

## 2021-04-22 NOTE — TELEPHONE ENCOUNTER
Patient sees Samuel Steven    Patient called to confirm she was getting a refill on her medication Prednisone 5 mg

## 2021-04-27 NOTE — PROGRESS NOTES
Assessment and Plan:   Ms Yoli Hammond a 24-year-old  female with history significant for polymyalgia rheumatica, insulin-dependent diabetes mellitus, with complications related to retinopathy causing legal blindness bilaterally, asthma, osteoarthritis, microcytic anemia of unclear etiology and morbid obesity, who presents for follow up of an episode involving bilateral arm pain/stiffness and swelling, associated with significantly elevated inflammatory markers with a CRP greater than 90 and an ESR of greater than 130, thought to be secondary to PMR  Allendemetria Joya is currently on prednisone 10 mg once daily       # Polymyalgia rheumatica  - Edie presents today for follow-up of significantly elevated inflammatory markers, as well as abrupt onset of symptoms related to bilateral upper extremity pain/stiffness/swelling, with mild symptoms of pain noted in her pelvic girdle region, for which she was initially evaluated in October 2019   Other than persistent headaches also experienced over the past 1 year, she does not complain of additional concerning symptoms on her review of systems   Her physical examination has also not been revealing, and further evaluation such as autoimmune serologies, a bilateral temporal artery ultrasound with temporal artery biopsies, and a CT of her chest/abdomen and pelvis have all been unrevealing  Janet Faye overall presentation was thought to be consistent with polymyalgia rheumatica   She states after few days of taking the initial steroids she noticed a significant improvement in her overall joint pains, swelling and stiffness, and has not had any major recurrences of the symptoms     - Since the last office visit in September 2020 she has mostly been maintained on prednisone at a dose of 10 mg once daily as when she tapered down to 5 mg once daily she noticed a recurrence of her symptoms  She has been lost to follow-up since then    For the next steps in treatment I recommend re-initiating a steroid taper by decreasing the prednisone by 1 mg every 4 weeks  If she notices a recurrence of symptoms with doing so I advised her to contact me  If she continues to be resistant to a steroid taper we may need to consider adding on steroid sparing medications such as methotrexate      - Her inflammatory markers will be repeated today      - She will follow up with Neurology for the headaches, but so far the evaluation has not revealed a rheumatic etiology for the chronic headaches      # Chronic steroid use  - She is aware of the side effects associated with chronic steroid use including but not limited to, risk for infections, cataracts/glaucoma, hypertension, worsening diabetic control, gastritis, osteoporosis and avascular necrosis   I advised her to continue daily calcium and vitamin-D supplements   She did undergo a DEXA scan in January 2020 which was normal      # Neuropathy of her extremities  - This is likely secondary to diabetic neuropathy  To further address the ulnar neuropathy detected on the right upper extremity EMG/NCS study I referred her to Orthopedics  Plan:  Diagnoses and all orders for this visit:    Polymyalgia rheumatica ([facility] )  -     C-reactive protein; Future  -     Sedimentation rate, automated; Future  -     predniSONE 1 mg tablet; Combine to take 9 mg once daily  Decrease by 1 mg every 4 weeks  Current chronic use of systemic steroids    Ulnar neuropathy at elbow, right  -     Ambulatory referral to Orthopedic Surgery; Future    Insulin dependent type 2 diabetes mellitus (HCC)    Class 3 severe obesity without serious comorbidity with body mass index (BMI) of 50 0 to 59 9 in adult, unspecified obesity type ([facility] )      Activities as tolerated  Exercise: try to maintain a low impact exercise regimen as much as possible  Continue other medications as prescribed by PCP and other specialists  RTC in 3 months          HPI    INITIAL VISIT NOTE:  Ms  Martin is a 26-year-old [place] American female with history significant for insulin-dependent diabetes mellitus, with complications related to retinopathy causing legal blindness bilaterally, asthma, osteoarthritis, microcytic anemia of unclear etiology and morbid obesity, who presents for further evaluation of an episode involving bilateral arm pain/stiffness and swelling, associated with significantly elevated inflammatory markers with a CRP greater than 90 and an ESR of greater than 130   She is referred by Dr Jaleesa Davila a rheumatology consult      Patient reports in the first week of September 2019, she woke up one morning with sudden onset of pain and stiffness affecting her bilateral arms, to such an extent that she was unable to move them even slightly   This was also associated with a weakness sensation of her lower legs   As her symptoms did not improve within the week she was seen in the emergency room and admitted for further evaluation   Due to the visible swelling an x-ray of her hand and forearm were initially done which only showed dorsal soft tissue swelling   An upper extremity venous duplex ruled out evidence of a deep vein thrombosis   A CTA of her right upper extremity was essentially unremarkable except for edema noted in the subcutaneous fat of the distal forearm and hand although without a vascular etiology   The aortic arch and distal arteries were unremarkable      Further lab workup showed a leukocytosis of 14, with a chronic microcytic anemia with stable hemoglobin of 9, as well as thrombocytosis with platelets ranging from 500-700   Mild transaminitis was seen which eventually resolved, although she continued to have an elevated alkaline phosphatase which is still persistent   An ESR was found to be elevated at greater than 130, with a C reactive protein of greater than 90   An JUAN screen was positive at 1:80 homogeneous pattern   A hepatitis C antibody was found to be equivocal   An anti cardiolipin IgM antibody was mildly elevated at 13   TSH, blood cultures, uric acid, urinalysis, CK, rheumatoid factor, anti CCP antibody, anti neutrophilic cytoplasmic antibody, lupus anticoagulant, Lyme disease PCR, Sjogren's antibodies, SPEP, total complement, beta 2 glycoprotein antibodies, paraneoplastic profile, anti double-stranded DNA antibody, LDH, beta 2 microglobulin, immunoglobulin assay, and direct Gerri test were unremarkable      She reports during the hospital stay she declined the use of steroids due to the insulin-dependent diabetes, or pain medication use   Her symptoms were managed with Tylenol as needed, as well as therapy   She was also evaluated by vascular surgery during her hospital stay, with the workup being unrevealing   She was discharged home and advised to follow-up with Rheumatology for further evaluation  Jacqueline Reed was a concern for temporal arteritis versus polymyalgia rheumatica given the abrupt onset of her symptoms as well as the elevated inflammatory markers   She reports the majority of her symptoms have since resolved, and lasted for an entire duration of approximately 2 weeks   She did have follow-up labs done on September 30th which showed a persistently elevated ESR of 121, but with the down trending CRP of 23 2      Patient reports since December 2018 she has been experiencing new onset headaches which were diagnosed as migraine headaches by Neurology  Isaiah Matias states that the headache is sometimes felt at the back of her head or over her entire head, but is not usually limited to one side   She was recently seen in the emergency room for the headaches as well and no acute findings were noted on an MRI brain are MRA head   The MRI of her brain did show cerebral atrophy more than expected for the patient's age  Jacqueline Reed were also changes that could be consistent with mild chronic white matter microangiopathy versus sequelae of chronic migraine disease   A CTA head and neck was also unremarkable      She reports a few months ago her weight was 336 lb, and today she is presenting with a weight of 304 lb  Orjose Cornell thinks this may be related to a decreased appetite   When her symptoms started at the beginning of September she did experience jaw pain on the right side which self-resolved   She does report a history of multiple miscarriages   She denies fevers, night sweats, scalp tenderness/pain, new vision changes (she does have a history of retinopathy related to insulin-dependent diabetes mellitus, and states she is legally blind in both eyes), current jaw claudication, hair loss, sicca symptoms, unexplained skin rashes, psoriasis, photosensitivity, mouth/nose ulcers, epistaxis, recurrent sinus disease, swollen glands, pleuritic chest pain, hemoptysis (she does have a chronic history of shortness of breath and cough related to asthma), abdominal pain, vomiting, diarrhea, blood in stools, blood clots, Raynaud's, focal weakness or family history of autoimmune disease   Her sister may have some type of arthritis      She is up-to-date with a mammogram, and states Pap smears were discontinued as she has had a hysterectomy  Saadia Cornell has never had a colonoscopy         11/6/2019:  Patient presents for a follow-up today  Tiffanie Ramires reviewed the ultrasound done of her bilateral temporal arteries which was unrevealing   A bilateral temporal artery biopsy did not show any inflammatory changes   Her ESR and CRP were still elevated at 95 and 20 7, respectively   Her hemoglobin was stable at 9 3   Her anti smooth muscle antibody was mildly elevated at 25   Her C3 and C4 were also mildly elevated   The remainder of the CBC, CMP (except for the alkaline phosphatase), SPEP, immunofixation, CK, aldolase, HCV RNA, HIV, ferritin, anti mitochondrial antibody, urinalysis and urine protein creatinine ratio were unremarkable      She reports since the last office visit she has had approximately 4 flare-ups of pain and stiffness affecting her predominantly from the bilateral elbows up to her shoulders   She states that this has been occurring on a weekly basis, but it does take days to resolve and overlaps with the next flare she has  Piper Hannon recalls in the past she has also experienced pain in her bilateral hip region, but states currently her predominant symptoms have been occurring from the elbows upwards  [facility] on occasion will she experience a discomfort from her wrists radiating into her hands  Piper Hannon denies any new complaints of joint pains or swelling      Since the last office visit she has also been experiencing increasing redness of her bilateral eyes   She did see her ophthalmologist a few weeks ago and apparently there was no inflammation detected  Piper Hannon is scheduled for a follow-up on November 20th      She has also been having worsening headaches which she feels in a bandlike fashion across her head  Piper Hannon has never been evaluated by Neurology         12/4/2019:  Patient presents for a follow-up of polymyalgia rheumatica  Piper Hannon is currently on oral prednisone 20 mg once daily   She did not have a chance to do her labs following the prior office visit      She reports a few days after starting the prednisone at 20 mg once daily she started to notice a significant improvement in her joint pains and swelling   She states it has also temporarily been helping with the headaches as well as the vision changes and redness of her eyes   As far she is aware she has never been told of an inflammatory eye disorder by her ophthalmologist  Piper Hannon reports with activities such as vacuuming and cleaning the house this has also been helping with her joint pain, but the improvement has all been amplified after starting the prednisone   Symptomatically she has been tolerating the steroids well, but on a few occasions has noticed blood sugar readings in the 400s to 500s   She has not contacted her endocrinologist with this information, and they are not aware that she has been started on steroids   She overall denies any joint pains, swelling or stiffness, but states on occasion with changes in the weather and the cooler temperatures she may feel some achiness      She continues to experience the headaches, and is scheduled for a neurology evaluation in February 2020   She was recently seen by Hematology and her lab abnormalities were all thought to be related to a reactive process   No other complaints noted at this time         1/8/2020:  Patient presents for a follow-up of polymyalgia rheumatica  Sukumar Reyes is currently on oral prednisone 15 mg once daily   I reviewed her labs done yesterday which showed an ESR of 59 which had improved, but her C reactive protein continues to be elevated at 38  5      At the last office visit we had decreased her prednisone dose from 20-15 mg once daily   She mentions that there has been no significant change in her symptoms with doing so and she is tolerating the steroid taper well   Dependent on the weather she may have some achiness around her shoulder region, but otherwise denies any significant flare-ups of joint pain, swelling or stiffness   She is tolerating the prednisone well as well   She continues to check her blood sugars 3-4 times daily, and is in touch with her endocrinologist regarding the hyperglycemia   They did increase her insulin regimen      She will be following up with her ophthalmologist at the end of January   She continues to experience the eye redness and blurred vision  Sukumar Reyes has an appointment with Neurology next month as she continues to experience the headaches         2/19/2020:  Patient presents for follow-up of polymyalgia rheumatica  Sukumar Reyes is currently on prednisone 10 mg once daily      At the last office visit she was on 15 mg once daily, and we had discussed tapering by 2 5 mg every 2 weeks   She states at a dose of 12 5 mg once daily she started to notice a slight recurrence of pain mostly affecting her shoulders and right wrist, but this change worsened even further when she decreased to 10 mg once daily   She has been on this dose for approximately 2 weeks now  Espinoza Barahona states that she is still able to exercise and work through the pain in her shoulders, but it has also affected her right wrist and very occasionally in her bilateral groin region   She has noticed mild swelling of her right shoulder without any other swollen joints   She is not really experiencing morning stiffness      She has had issues with significantly high blood sugar readings occasionally greater than 600   She is working closely with endocrinology to adjust her insulin regimen   In view of this she is very hesitant to increase her dose of the steroids again      She was recently seen by Neurology in view of the chronic headaches and this is thought to possibly be related to uncontrolled hypertension         4/1/2020:  Patient presents for a follow-up of polymyalgia rheumatica  Espinoza Barahona is currently on prednisone 8 mg once daily   I reviewed her ESR and CRP done following the last office visit which were down trending at 51 and 20 9, respectively      Patient reports following our last office visit in mid February she decreased her prednisone dose to 9 mg once daily and had been on that for the past 1 month   She further decreased her dose to 8 mg once daily today   She mentions while decreasing her prednisone dose from 10-9 mg once daily, she noticed a slight flare-up of pain and swelling affecting her right wrist, as well as intermittent pain affecting her right shoulder   She will also notice occasional pain affecting her bilateral knees, but this could be related to osteoarthritis   No other significant joint pains, swelling or stiffness noted with the steroid decrease   She denies any new headaches, vision changes or jaw claudication   She is continuing follow-up with neurology for the headaches she has been experiencing  Espinoza Barahona is also scheduled for an EEG next month      She has been careful with monitoring her blood pressures as well as blood sugars, and states that with adjustments to her insulin regimen her blood sugars are mostly under 200 at this time         9/23/2020:  Patient presents for a follow-up of polymyalgia rheumatica  She is currently on prednisone 6 mg once daily  She has not had any recent labs done      Since the last office visit she has been tapering by 1 mg every 2-3 months  She has been on the 6 mg once daily since August   She has not had any issues with the steroid taper  She states at one point of time she was having a flare-up of pain affecting her right wrist but this has resolved and she is currently denying any significant issues related to joint pains  She does feel like she has slight swelling and puffiness in her bilateral forearm region  She is not really experiencing any morning stiffness  She has noticed a numbness sensation of her right hand 5th digit which is bothersome to her      No other complaints noted today  She has been monitoring her blood sugars and follows with endocrinology for management of the insulin  4/28/2021:  Patient presents for a follow-up of polymyalgia rheumatica  She is currently on prednisone 10 mg once daily  The labs checked in September 2020 showed an elevated ESR and CRP of 95 and 28 4, respectively  She has not been seen for a follow-up since then  I also reviewed the EMG/NCS study of her right upper extremity which showed ulnar neuropathy  At the last office visit she was on prednisone at 6 mg once daily and was tapering by 1 mg every 1-2 months  She reports when she was down to a dose of 5 mg once daily her overall body pain and stiffness significantly worsened requiring her to use a walker  In view of this she increase the prednisone back to 10 mg once daily and has been on this dose consistently for the past few months    This significantly helped with improving her symptoms  Currently she mostly describes a sensation of stiffness in her hands as well as the numbness in her bilateral 5th digits  No significant joint pains  She describes an achiness throughout her legs as well as numbness in her feet and occasional leg swelling  She has otherwise been tolerating the prednisone well without any concerns for side effects or infections  She is following with endocrinology for management of the diabetes and states that this has been much better controlled with changing her insulin regimen  Overall she has been feeling quite well and has started volunteering programs  The following portions of the patient's history were reviewed and updated as appropriate: allergies, current medications, past family history, past medical history, past social history, past surgical history and problem list       Review of Systems  Constitutional: Negative for weight change, fevers, chills, night sweats, fatigue  ENT/Mouth: Negative for hearing changes, ear pain, nasal congestion, sinus pain, hoarseness, sore throat, rhinorrhea, swallowing difficulty  Eyes: Negative for pain, redness, discharge, vision changes  Cardiovascular: Negative for chest pain, SOB, palpitations  Respiratory: Negative for cough, sputum, wheezing, dyspnea  Gastrointestinal: Negative for nausea, vomiting, diarrhea, constipation, pain, heartburn  Genitourinary: Negative for dysuria, urinary frequency, hematuria  Musculoskeletal: As per HPI  Skin: Negative for skin rash, color changes  Neuro: Negative for weakness, numbness, tingling, loss of consciousness  Psych: Negative for anxiety, depression  Heme/Lymph: Negative for easy bruising, bleeding, lymphadenopathy          Past Medical History:   Diagnosis Date    Anemia     Arthritis     Diabetes mellitus ([facility]     Dyspnea on exertion     Hyperlipidemia     Hypertension     Legally blind 2010    Mild intermittent asthma with exacerbation 8/4/2018    Miscarriage     x 3    Seizures (HCC)     [facility]ickle cell trait [facility]     Sleep apnea     Thyroid nodule 3/6/2020       Past Surgical History:   Procedure Laterality Date    CATARACT EXTRACTION Bilateral     august and september    EYE SURGERY      HYSTERECTOMY      42    OOPHORECTOMY Left     37    WI TEMPORAL ARTERY LIGATN OR BX Bilateral 10/17/2019    Procedure: BIOPSY ARTERY TEMPORAL;  Surgeon: Kajal Palm DO;  Location: [facility];  Service: Vascular    US GUIDED THYROID BIOPSY  5/13/2020       Social History     Socioeconomic History    Marital status: /Civil Union     Spouse name: Not on file    Number of children: Not on file    Years of education: Not on file    Highest education level: Not on file   Occupational History    Occupation: long term disability   Social Needs    Financial resource strain: Not very hard    Food insecurity     Worry: Sometimes true     Inability: Sometimes true    Transportation needs     Medical: No     Non-medical: No   Tobacco Use    Smoking status: Never Smoker    Smokeless tobacco: Never Used   Substance and Sexual Activity    Alcohol use: Never     Alcohol/week: 0 0 standard drinks     Frequency: Never     Drinks per session: Patient refused     Binge frequency: Never    Drug use: No    Sexual activity: Not Currently     Partners: Male     Birth control/protection: None   Lifestyle    Physical activity     Days per week: 0 days     Minutes per session: 0 min    Stress:  Only a little   Relationships    Social connections     Talks on phone: Twice a week     Gets together: Twice a week     Attends Congregation service: Not on file     Active member of club or organization: Not on file     Attends meetings of clubs or organizations: Not on file     Relationship status:     Intimate partner violence     Fear of current or ex partner: Not on file     Emotionally abused: Not on file     Physically abused: Not on file     Forced sexual activity: Not on file   Other Topics Concern    Not on file   Social History Narrative    Caffeine use    Resides with spouse and one son       Family History   Problem Relation Age of Onset    Heart attack Mother     Heart disease Mother     Hypertension Mother     No Known Problems Father     Heart disease Sister     No Known Problems Brother     No Known Problems Son     No Known Problems Daughter     Heart attack Maternal Grandmother     Heart disease Maternal Grandmother     Diabetes Other     Heart attack Other     No Known Problems Sister     No Known Problems Sister     No Known Problems Paternal Aunt     No Known Problems Son     Cancer Neg Hx     Stroke Neg Hx        No Known Allergies      Current Outpatient Medications:     albuterol (Proventil HFA) 90 mcg/act inhaler, Inhale 2 puffs every 6 (six) hours as needed for wheezing For another 24 hours use every 4 hours standing, Disp: 6 7 g, Rfl: 0    aspirin (ADULT ASPIRIN EC LOW STRENGTH) 81 mg EC tablet, Take 81 mg by mouth daily , Disp: , Rfl:     atorvastatin (LIPITOR) 40 mg tablet, Take 1 tablet (40 mg total) by mouth daily, Disp: 90 tablet, Rfl: 1    [employer] MICROLET LANCETS lancets, Inject 1 applicator into the skin 4 (four) times a day, Disp: , Rfl:     Blood Glucose Monitoring Suppl (Contour Next Monitor) w/Device KIT, Disp 1 meter Dx; E11 9, Disp: 1 kit, Rfl: 1    Blood Pressure Monitor KIT, Check blood pressures BID, Disp: 1 each, Rfl: 0    budesonide-formoterol (SYMBICORT) 80-4 5 MCG/ACT inhaler, Inhale 2 puffs 2 (two) times a day Rinse mouth after use , Disp: 1 Inhaler, Rfl: 5    Cholecalciferol (HM Vitamin D3) 100 MCG (4000 UT) CAPS, 4000 units daily, Disp: 60 capsule, Rfl: 5    Contour Next Test test strip, Test 3x daily, Disp: 300 each, Rfl: 3    Diclofenac Sodium POWD, , Disp: , Rfl:     diltiazem (CARDIZEM CD) 240 mg 24 hr capsule, Take 2 capsules (480 mg total) by mouth daily, Disp: 180 capsule, Rfl: 1    ferrous sulfate 324 (65 Fe) mg, Take 1 tablet (324 mg total) by mouth every other day (Patient not taking: Reported on 3/16/2021), Disp: 45 tablet, Rfl: 3    gabapentin (NEURONTIN) 100 mg capsule, Take 1 capsule in am and 2-3 capsules at night, Disp: 90 capsule, Rfl: 3    hydrochlorothiazide (HYDRODIURIL) 25 mg tablet, Take 1 tablet (25 mg total) by mouth daily, Disp: 90 tablet, Rfl: 1    Insulin Pen Needle (BD Pen Needle Tita U/F) 32G X 4 MM MISC, Use 2 (two) times a day, Disp: 200 each, Rfl: 1    Insulin Regular Human, Conc, (HumuLIN R U-500 KwikPen) 500 units/mL CONCENTRATED U-500 injection pen, 100 units before breakfast, 45 units before dinner, Disp: 12 pen, Rfl: 1    losartan (COZAAR) 100 MG tablet, Take 1 tablet (100 mg total) by mouth daily, Disp: 90 tablet, Rfl: 1    metFORMIN (GLUCOPHAGE-XR) 500 mg 24 hr tablet, Take 2 tablets (1,000 mg total) by mouth 2 (two) times a day with meals, Disp: 360 tablet, Rfl: 1    predniSONE 1 mg tablet, Combine to take 9 mg once daily  Decrease by 1 mg every 4 weeks  , Disp: 360 tablet, Rfl: 1    predniSONE 5 mg tablet, TAKE 2 TABLETS BY MOUTH EVERY DAY, Disp: 180 tablet, Rfl: 0    Riboflavin 400 MG TABS, Take 1 tablet (400 mg total) by mouth daily (Patient taking differently: Take 400 mg by mouth daily (B2)), Disp: 30 tablet, Rfl: 0    topiramate (TOPAMAX) 100 mg tablet, TAKE 1 TABLET BY MOUTH AT BEDTIME, Disp: 90 tablet, Rfl: 1      Objective:    Vitals:    04/28/21 1123   BP: 136/82   Pulse: 100       Physical Exam  General: Well appearing, well nourished, in no distress  Oriented x 3, normal mood and affect  Ambulating with aid of a walker  Skin: Good turgor, no rash, unusual bruising or prominent lesions  Hair: Normal texture and distribution  Nails: Normal color, no deformities  HEENT:  Head: Normocephalic, atraumatic  Eyes: Conjunctiva clear, sclera non-icteric, EOM intact    Extremities: No amputations or deformities, cyanosis, edema  Neurologic: Alert and oriented  Psychiatric: Normal mood and affect  DOC Ibrahim    Rheumatology

## 2021-04-28 ENCOUNTER — OFFICE VISIT (OUTPATIENT)
Dept: RHEUMATOLOGY | Facility: CLINIC | Age: 61
End: 2021-04-28
Payer: MEDICARE

## 2021-04-28 VITALS — SYSTOLIC BLOOD PRESSURE: 136 MMHG | HEART RATE: 100 BPM | DIASTOLIC BLOOD PRESSURE: 82 MMHG

## 2021-04-28 DIAGNOSIS — G56.21 ULNAR NEUROPATHY AT ELBOW, RIGHT: ICD-10-CM

## 2021-04-28 DIAGNOSIS — E11.9 INSULIN DEPENDENT TYPE 2 DIABETES MELLITUS (HCC): ICD-10-CM

## 2021-04-28 DIAGNOSIS — Z79.4 INSULIN DEPENDENT TYPE 2 DIABETES MELLITUS (HCC): ICD-10-CM

## 2021-04-28 DIAGNOSIS — E66.01 CLASS 3 SEVERE OBESITY WITHOUT SERIOUS COMORBIDITY WITH BODY MASS INDEX (BMI) OF 50.0 TO 59.9 IN ADULT, UNSPECIFIED OBESITY TYPE (HCC): ICD-10-CM

## 2021-04-28 DIAGNOSIS — Z79.52 CURRENT CHRONIC USE OF SYSTEMIC STEROIDS: ICD-10-CM

## 2021-04-28 DIAGNOSIS — M35.3 POLYMYALGIA RHEUMATICA (HCC): Primary | ICD-10-CM

## 2021-04-28 PROCEDURE — 99215 OFFICE O/P EST HI 40 MIN: CPT | Performed by: INTERNAL MEDICINE

## 2021-04-28 RX ORDER — PREDNISONE 1 MG/1
TABLET ORAL
Qty: 360 TABLET | Refills: 1 | Status: SHIPPED | OUTPATIENT
Start: 2021-04-28 | End: 2021-07-28 | Stop reason: SDUPTHER

## 2021-04-29 ENCOUNTER — OFFICE VISIT (OUTPATIENT)
Dept: CARDIOLOGY CLINIC | Facility: CLINIC | Age: 61
End: 2021-04-29
Payer: MEDICARE

## 2021-04-29 VITALS
DIASTOLIC BLOOD PRESSURE: 60 MMHG | BODY MASS INDEX: 44.41 KG/M2 | HEIGHT: 68 IN | SYSTOLIC BLOOD PRESSURE: 122 MMHG | WEIGHT: 293 LBS | HEART RATE: 88 BPM

## 2021-04-29 DIAGNOSIS — I10 BENIGN ESSENTIAL HTN: ICD-10-CM

## 2021-04-29 DIAGNOSIS — E78.5 DYSLIPIDEMIA: Primary | ICD-10-CM

## 2021-04-29 DIAGNOSIS — I71.2 THORACIC AORTIC ANEURYSM WITHOUT RUPTURE (HCC): ICD-10-CM

## 2021-04-29 DIAGNOSIS — R06.00 DOE (DYSPNEA ON EXERTION): ICD-10-CM

## 2021-04-29 PROCEDURE — 99214 OFFICE O/P EST MOD 30 MIN: CPT | Performed by: INTERNAL MEDICINE

## 2021-04-29 PROCEDURE — 93000 ELECTROCARDIOGRAM COMPLETE: CPT | Performed by: INTERNAL MEDICINE

## 2021-04-29 NOTE — PROGRESS NOTES
Cardiology   MD Blanca Brito MD Ather Mansoor, MD Massie Dacosta, DO, Bladimir Wu DO, Ayana Paiz DO, [facility] [place]  -------------------------------------------------------------------  [facility]  [address] [place] [id number]  [address]                     1960                     [id number]          Assessment/Plan:     1  Hypertension  2  Stable ascending thoracic aortic aneurysm, 4 2 cm-last CT chest 09/2019  3  Hypertensive heart disease with mild-to-moderate concentric hypertrophy  4  Dyslipidemia  5  Diabetes  6  Polymyalgia rheumatica, chronic steroid use  7  Cerebral artery aneurysms  8  Chronic/stable exertional dyspnea with normal coronary angiography 01/2019        · Blood pressure controlled, continue diltiazem, losartan  · Continue atorvastatin for dyslipidemia  Repeat lipid panel has recently been ordered by PCP   · Heart healthy diet, exercise, weight loss has been strongly recommended  · Chronic/stable lower extremity edema  Continue HCTZ  Prior NT proBNP 03/05/2020 normal at 38 pg/mL          Follow-up in 1 year        Interval History:      Kinga Villeda a 56 y  o  female  with past medical history of diabetes since 2003, normal coronary arteries from 1/2019, microalbuminemia, neuropathy, hypertension with left ventricular hypertrophy, family history of cardiovascular disease in her mother who had sudden cardiac death at 66years old, aneurysmal ascending aorta measuring up to 4 3 cm, prominent main pulmonary artery at 3 5 cm, recently diagnosed reportedly mild sleep apnea, asthma, migraines, remote history of seizures  Zina Lucas has been diagnosed with cerebral aneurysm and PMR as well      Prior CT chest 09/2019 4 2 cm ascending thoracic aortic aneurysm, stable and 3 7 cm main pulmonary artery is ectasia   Prior CT head 03/06/2020 revealed 4 mm aneurysm arising from distal cavernous segment of left ICA and inferolaterally projects 4 mm aneurysm arising from the junction of cavernous and supraclinoid left ICA, stable  Echocardiogram 09/2019 revealed ejection fraction 60% with mild-to-moderate concentric hypertrophy  She presents today for follow-up with no complaints  She has been modifying her diet, and feels well  Although she has exertional dyspnea she states it is chronic, and actually improved since she has been watching her diet  She has chronic but stable mild lower extremity edema             Vitals:  Vitals:    04/29/21 1441   Weight: (!) 152 kg (335 lb 9 6 oz)   Height: 5' 8" (1 727 m)         Past Medical History:   Diagnosis Date    Anemia     Arthritis     Diabetes mellitus ([facility] )     Dyspnea on exertion     Hyperlipidemia     Hypertension     Legally blind 2010    Mild intermittent asthma with exacerbation 8/4/2018    Miscarriage     x 3    Seizures (HCC)     Sickle cell trait ([facility] )     Sleep apnea     Thyroid nodule 3/6/2020     Social History     Socioeconomic History    Marital status: /Civil Union     Spouse name: Not on file    Number of children: Not on file    Years of education: Not on file    Highest education level: Not on file   Occupational History    Occupation: long term disability   Social Needs    Financial resource strain: Not very hard    Food insecurity     Worry: Sometimes true     Inability: Sometimes true    Transportation needs     Medical: No     Non-medical: No   Tobacco Use    Smoking status: Never Smoker    Smokeless tobacco: Never Used   Substance and Sexual Activity    Alcohol use: Never     Alcohol/week: 0 0 standard drinks     Frequency: Never     Drinks per session: Patient refused     Binge frequency: Never    Drug use: No    Sexual activity: Not Currently     Partners: Male     Birth control/protection: None   Lifestyle    Physical activity     Days per week: 0 days     Minutes per session: 0 min    Stress:  Only a little   Relationships    Social connections     Talks on phone: Twice a week     Gets together: Twice a week     Attends Jainism service: Not on file     Active member of club or organization: Not on file     Attends meetings of clubs or organizations: Not on file     Relationship status:     Intimate partner violence     Fear of current or ex partner: Not on file     Emotionally abused: Not on file     Physically abused: Not on file     Forced sexual activity: Not on file   Other Topics Concern    Not on file   Social History Narrative    Caffeine use    Resides with spouse and one son      Family History   Problem Relation Age of Onset    Heart attack Mother     Heart disease Mother     Hypertension Mother     No Known Problems Father     Heart disease Sister     No Known Problems Brother     No Known Problems Son     No Known Problems Daughter     Heart attack Maternal Grandmother     Heart disease Maternal Grandmother     Diabetes Other     Heart attack Other     No Known Problems Sister     No Known Problems Sister     No Known Problems Paternal Aunt     No Known Problems Son     Cancer Neg Hx     Stroke Neg Hx      Past Surgical History:   Procedure Laterality Date    CATARACT EXTRACTION Bilateral     august and september    EYE SURGERY      HYSTERECTOMY      37    OOPHORECTOMY Left     37    FL TEMPORAL ARTERY LIGATN OR BX Bilateral 10/17/2019    Procedure: BIOPSY ARTERY TEMPORAL;  Surgeon: Deneen Alvarez DO;  Location: [facility];  Service: Vascular    US GUIDED THYROID BIOPSY  5/13/2020       Current Outpatient Medications:     albuterol (Proventil HFA) 90 mcg/act inhaler, Inhale 2 puffs every 6 (six) hours as needed for wheezing For another 24 hours use every 4 hours standing, Disp: 6 7 g, Rfl: 0    aspirin (ADULT ASPIRIN EC LOW STRENGTH) 81 mg EC tablet, Take 81 mg by mouth daily , Disp: , Rfl:     atorvastatin (LIPITOR) 40 mg tablet, Take 1 tablet (40 mg total) by mouth daily, Disp: 90 tablet, Rfl: 1    SARAH MICROLET LANCETS lancets, Inject 1 applicator into the skin 4 (four) times a day, Disp: , Rfl:     Blood Glucose Monitoring Suppl (Contour Next Monitor) w/Device KIT, Disp 1 meter Dx; E11 9, Disp: 1 kit, Rfl: 1    Blood Pressure Monitor KIT, Check blood pressures BID, Disp: 1 each, Rfl: 0    budesonide-formoterol (SYMBICORT) 80-4 5 MCG/ACT inhaler, Inhale 2 puffs 2 (two) times a day Rinse mouth after use , Disp: 1 Inhaler, Rfl: 5    Cholecalciferol (HM Vitamin D3) 100 MCG (4000 UT) CAPS, 4000 units daily, Disp: 60 capsule, Rfl: 5    Contour Next Test test strip, Test 3x daily, Disp: 300 each, Rfl: 3    Diclofenac Sodium POWD, , Disp: , Rfl:     diltiazem (CARDIZEM CD) 240 mg 24 hr capsule, Take 2 capsules (480 mg total) by mouth daily, Disp: 180 capsule, Rfl: 1    ferrous sulfate 324 (65 Fe) mg, Take 1 tablet (324 mg total) by mouth every other day, Disp: 45 tablet, Rfl: 3    gabapentin (NEURONTIN) 100 mg capsule, Take 1 capsule in am and 2-3 capsules at night, Disp: 90 capsule, Rfl: 3    hydrochlorothiazide (HYDRODIURIL) 25 mg tablet, Take 1 tablet (25 mg total) by mouth daily, Disp: 90 tablet, Rfl: 1    Insulin Pen Needle (BD Pen Needle Tita U/F) 32G X 4 MM MISC, Use 2 (two) times a day, Disp: 200 each, Rfl: 1    Insulin Regular Human, Conc, (HumuLIN R U-500 KwikPen) 500 units/mL CONCENTRATED U-500 injection pen, 100 units before breakfast, 45 units before dinner, Disp: 12 pen, Rfl: 1    losartan (COZAAR) 100 MG tablet, Take 1 tablet (100 mg total) by mouth daily, Disp: 90 tablet, Rfl: 1    metFORMIN (GLUCOPHAGE-XR) 500 mg 24 hr tablet, Take 2 tablets (1,000 mg total) by mouth 2 (two) times a day with meals, Disp: 360 tablet, Rfl: 1    predniSONE 5 mg tablet, TAKE 2 TABLETS BY MOUTH EVERY DAY, Disp: 180 tablet, Rfl: 0    Riboflavin 400 MG TABS, Take 1 tablet (400 mg total) by mouth daily (Patient taking differently: Take 400 mg by mouth daily (B2)), Disp: 30 tablet, Rfl: 0    topiramate (TOPAMAX) 100 mg tablet, TAKE 1 TABLET BY MOUTH AT BEDTIME, Disp: 90 tablet, Rfl: 1    predniSONE 1 mg tablet, Combine to take 9 mg once daily  Decrease by 1 mg every 4 weeks  (Patient not taking: Reported on 4/29/2021), Disp: 360 tablet, Rfl: 1        Review of Systems:  Review of Systems   Constitutional: Negative for activity change, fever and unexpected weight change  HENT: Negative for facial swelling, nosebleeds and voice change  Respiratory: Positive for shortness of breath  Negative for chest tightness and wheezing  Cardiovascular: Positive for leg swelling  Negative for chest pain and palpitations  Gastrointestinal: Negative for abdominal distention  Genitourinary: Negative for hematuria  Musculoskeletal: Negative for arthralgias  Skin: Negative for color change, pallor, rash and wound  Neurological: Negative for dizziness, seizures and syncope  Psychiatric/Behavioral: Negative for agitation  Physical Exam:  Physical Exam  Vitals signs reviewed  Constitutional:       Appearance: She is well-developed  HENT:      Head: Normocephalic and atraumatic  Neck:      Musculoskeletal: Normal range of motion and neck supple  Cardiovascular:      Rate and Rhythm: Normal rate and regular rhythm  Heart sounds: Normal heart sounds  Pulmonary:      Effort: Pulmonary effort is normal       Breath sounds: Normal breath sounds  Abdominal:      Palpations: Abdomen is soft  Musculoskeletal: Normal range of motion  Skin:     General: Skin is warm and dry  Neurological:      Mental Status: She is alert and oriented to person, place, and time  Psychiatric:         Behavior: Behavior normal          Thought Content: Thought content normal          Judgment: Judgment normal          This note was completed in part utilizing [employer] Software    Grammatical errors, random word insertions, spelling mistakes, and incomplete sentences can be an occasional consequence of this system secondary to software limitations, ambient noise, and hardware issues  If you have any questions or concerns about the content, text, or information contained within the body of this dictation, please contact the provider for clarification

## 2021-05-07 ENCOUNTER — APPOINTMENT (OUTPATIENT)
Dept: LAB | Facility: HOSPITAL | Age: 61
End: 2021-05-07
Attending: INTERNAL MEDICINE
Payer: MEDICARE

## 2021-05-07 DIAGNOSIS — IMO0002 DIABETES MELLITUS TYPE 2 WITH COMPLICATIONS, UNCONTROLLED: ICD-10-CM

## 2021-05-07 DIAGNOSIS — M35.3 POLYMYALGIA RHEUMATICA (HCC): ICD-10-CM

## 2021-05-07 DIAGNOSIS — I10 BENIGN HYPERTENSION: ICD-10-CM

## 2021-05-07 DIAGNOSIS — E78.5 HYPERLIPIDEMIA, UNSPECIFIED HYPERLIPIDEMIA TYPE: ICD-10-CM

## 2021-05-07 DIAGNOSIS — I67.1 LEFT CAVERNOUS CAROTID ANEURYSM: ICD-10-CM

## 2021-05-07 LAB
ALBUMIN SERPL BCP-MCNC: 3.9 G/DL (ref 3–5.2)
ALP SERPL-CCNC: 121 U/L (ref 43–122)
ALT SERPL W P-5'-P-CCNC: 18 U/L
ANION GAP SERPL CALCULATED.3IONS-SCNC: 8 MMOL/L (ref 5–14)
AST SERPL W P-5'-P-CCNC: 18 U/L (ref 14–36)
BILIRUB SERPL-MCNC: 0.43 MG/DL
BUN SERPL-MCNC: 18 MG/DL (ref 5–25)
CALCIUM SERPL-MCNC: 10.1 MG/DL (ref 8.4–10.2)
CHLORIDE SERPL-SCNC: 105 MMOL/L (ref 97–108)
CHOLEST SERPL-MCNC: 164 MG/DL
CO2 SERPL-SCNC: 30 MMOL/L (ref 22–30)
CREAT SERPL-MCNC: 1.04 MG/DL (ref 0.6–1.2)
CREAT UR-MCNC: 50 MG/DL
CRP SERPL QL: 20.2 MG/L
ERYTHROCYTE [SEDIMENTATION RATE] IN BLOOD: 64 MM/HOUR (ref 0–29)
GFR SERPL CREATININE-BSD FRML MDRD: 67 ML/MIN/1.73SQ M
GLUCOSE P FAST SERPL-MCNC: 105 MG/DL (ref 70–99)
HDLC SERPL-MCNC: 45 MG/DL
LDLC SERPL CALC-MCNC: 96 MG/DL
MICROALBUMIN UR-MCNC: 104 MG/L (ref 0–20)
MICROALBUMIN/CREAT 24H UR: 208 MG/G CREATININE (ref 0–30)
POTASSIUM SERPL-SCNC: 3.2 MMOL/L (ref 3.6–5)
PROT SERPL-MCNC: 7.6 G/DL (ref 5.9–8.4)
SODIUM SERPL-SCNC: 143 MMOL/L (ref 137–147)
TRIGL SERPL-MCNC: 115 MG/DL

## 2021-05-07 PROCEDURE — 82043 UR ALBUMIN QUANTITATIVE: CPT

## 2021-05-07 PROCEDURE — 36415 COLL VENOUS BLD VENIPUNCTURE: CPT

## 2021-05-07 PROCEDURE — 80061 LIPID PANEL: CPT

## 2021-05-07 PROCEDURE — 82570 ASSAY OF URINE CREATININE: CPT

## 2021-05-07 PROCEDURE — 86140 C-REACTIVE PROTEIN: CPT

## 2021-05-07 PROCEDURE — 85652 RBC SED RATE AUTOMATED: CPT

## 2021-05-07 PROCEDURE — 80053 COMPREHEN METABOLIC PANEL: CPT

## 2021-05-13 ENCOUNTER — PATIENT OUTREACH (OUTPATIENT)
Dept: FAMILY MEDICINE CLINIC | Facility: CLINIC | Age: 61
End: 2021-05-13

## 2021-05-13 NOTE — PROGRESS NOTES
I called the patient but received voicemail  Message was left reminding her of her Endo appointment 5/18 at 2 pm   I will continue further outreach

## 2021-05-18 ENCOUNTER — OFFICE VISIT (OUTPATIENT)
Dept: ENDOCRINOLOGY | Facility: CLINIC | Age: 61
End: 2021-05-18
Payer: MEDICARE

## 2021-05-18 VITALS
DIASTOLIC BLOOD PRESSURE: 70 MMHG | HEART RATE: 98 BPM | SYSTOLIC BLOOD PRESSURE: 140 MMHG | HEIGHT: 68 IN | WEIGHT: 293 LBS | BODY MASS INDEX: 44.41 KG/M2

## 2021-05-18 DIAGNOSIS — IMO0002 DIABETES MELLITUS TYPE 2 WITH COMPLICATIONS, UNCONTROLLED: ICD-10-CM

## 2021-05-18 DIAGNOSIS — E55.9 VITAMIN D DEFICIENCY: ICD-10-CM

## 2021-05-18 DIAGNOSIS — I10 ESSENTIAL HYPERTENSION: Primary | ICD-10-CM

## 2021-05-18 DIAGNOSIS — I10 BENIGN HYPERTENSION: ICD-10-CM

## 2021-05-18 DIAGNOSIS — E04.1 THYROID NODULE: ICD-10-CM

## 2021-05-18 DIAGNOSIS — E78.5 HYPERLIPIDEMIA, UNSPECIFIED HYPERLIPIDEMIA TYPE: ICD-10-CM

## 2021-05-18 PROCEDURE — 95250 CONT GLUC MNTR PHYS/QHP EQP: CPT | Performed by: PHYSICIAN ASSISTANT

## 2021-05-18 PROCEDURE — 99215 OFFICE O/P EST HI 40 MIN: CPT | Performed by: PHYSICIAN ASSISTANT

## 2021-05-18 RX ORDER — INSULIN HUMAN 500 [IU]/ML
INJECTION, SOLUTION SUBCUTANEOUS
Qty: 12 PEN | Refills: 1
Start: 2021-05-18 | End: 2021-08-05

## 2021-05-18 NOTE — PROGRESS NOTES
Established Patient Progress Note      Chief Complaint   Patient presents with    Diabetes Type 2        History of Present Illness:   Juan Estrada is a 56 y o  female with a history of type 2 diabetes with long term use of insulin since many years ago  Reports complications of retinopathy, neuropathy, and microalbuminuria  Denies recent illness or hospitalizations  Denies recent severe hypoglycemic or severe hyperglycemic episodes  Denies any issues with her current regimen  home glucose monitoring: are performed regularly on avergae 2 3x per day  She does continue to have lows occasionally after dinner  She will have meat, vegetable and a small  Will notice lows after dinner or if walking or if missing a meal  She has met with dietician since last visit and doing a good job with her diet  She is enjoying her time cooking and watching movies and trying to walk/do stairs on a regular basis  Currently prednisone 10mg for PMR, follows with rheumatology-- next month will be weaning by 1mg monthly  Home blood glucose readings:   Before breakfast: Typically low 100s with 1 low of 67  Mid day variable with some high readings and some in target  Bedtime: variable with a few highs, some in target, and 2 lows 46 and 68     Current regimen:   Humulin R U500  100 units before breakfast  45 units before dinner  Metformin    Last Eye Exam: UTD  Last Foot Exam: UTD    Has hypertension: Taking HCTZ, cardizem, losartan  Has hyperlipidemia: Taking atorvastatin    Thyroid disorders: Thyroid nodule- needs ultrasound  Previous FNA showed no malignancy       Patient Active Problem List   Diagnosis    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, with long-term current use of insulin (HCC)    Benign hypertension    Seizures (HCC)    Exertional dyspnea    Enlarged pulmonary artery (HCC)    Aortic dilatation (HCC)    Anemia    Decreased pulses in feet    Diabetes mellitus type 2 with complications, uncontrolled ([facility] )    Hyperlipidemia    Microalbuminuria    Obstructive sleep apnea    Class 3 severe obesity with serious comorbidity and body mass index (BMI) of 45 0 to 49 9 in adult [facility])    Neuropathy of hand, right    Prolonged QT interval    Visual impairment in both eyes    Vitamin D deficiency    Lung nodules    Orthostatic hypotension    Mild intermittent asthma with exacerbation    Type 2 diabetes mellitus with microalbuminuria, with long-term current use of insulin ([facility]    Frequent headaches    Other inflammatory and immune myopathies, not elsewhere classified    Transaminitis    Morbid obesity ([facility] )    Polyneuropathy associated with underlying disease ([facility] )    PMR (polymyalgia rheumatica) ([facility])    Thrombocytosis ([facility] )    Left cavernous carotid aneurysm    Type 2 diabetes mellitus with hyperglycemia, with long-term current use of insulin ([facility])    Aneurysm ([facility])    Thyroid nodule    History of absence seizures    Hypersomnia    Migraine without aura and without status migrainosus, not intractable    Cerebral aneurysm without rupture    Chronic migraine without aura without status migrainosus, not intractable    Diabetes mellitus ([facility])    Essential hypertension    Depressed mood    Blurry vision, bilateral    Ulnar neuropathy of right upper extremity      Past Medical History:   Diagnosis Date    Anemia     Arthritis     [facility] mellitus [facility]     Dyspnea on exertion     Hyperlipidemia     Hypertension     Legally blind 2010    Mild intermittent asthma with exacerbation 8/4/2018    Miscarriage     x 3    Seizures ([facility])     Sickle cell trait ([facility] )     Sleep apnea     Thyroid nodule 3/6/2020      Past Surgical History:   Procedure Laterality Date    CATARACT EXTRACTION Bilateral     august and september    EYE SURGERY      HYSTERECTOMY      37    OOPHORECTOMY Left     37    W[place] TEMPORAL ARTERY LIGATN OR BX Bilateral 10/17/2019    Procedure: BIOPSY ARTERY TEMPORAL;  Surgeon: Michael Jeong DO;  [facility]: [facility];  Service: Vascular    US GUIDED THYROID BIOPSY  5/13/2020      Family History   Problem Relation Age of Onset    Heart attack Mother     Heart disease Mother     Hypertension Mother     No Known Problems Father     Heart disease Sister     No Known Problems Brother     No Known Problems Son     No Known Problems Daughter     Heart attack Maternal Grandmother     Heart disease Maternal Grandmother     Diabetes Other     Heart attack Other     No Known Problems Sister     No Known Problems Sister     No Known Problems Paternal Aunt     No Known Problems Son     Cancer Neg Hx     Stroke Neg Hx      Social History     Tobacco Use    Smoking status: Never Smoker    Smokeless tobacco: Never Used   Substance Use Topics    Alcohol use: Never     Alcohol/week: 0 0 standard drinks     Frequency: Never     Drinks per session: Patient refused     Binge frequency: Never     No Known Allergies      Current Outpatient Medications:     albuterol (Proventil HFA) 90 mcg/act inhaler, Inhale 2 puffs every 6 (six) hours as needed for wheezing For another 24 hours use every 4 hours standing, Disp: 6 7 g, Rfl: 0    aspirin (ADULT ASPIRIN EC LOW STRENGTH) 81 mg EC tablet, Take 81 mg by mouth daily , Disp: , Rfl:     atorvastatin (LIPITOR) 40 mg tablet, Take 1 tablet (40 mg total) by mouth daily, Disp: 90 tablet, Rfl: 1    [employer] MICROLET LANCETS lancets, Inject 1 applicator into the skin 4 (four) times a day, Disp: , Rfl:     Blood Glucose Monitoring Suppl (Contour Next Monitor) w/Device KIT, Disp 1 meter Dx; E11 9, Disp: 1 kit, Rfl: 1    Blood Pressure Monitor KIT, Check blood pressures BID, Disp: 1 each, Rfl: 0    budesonide-formoterol (SYMBICORT) 80-4 5 MCG/ACT inhaler, Inhale 2 puffs 2 (two) times a day Rinse mouth after use , Disp: 1 Inhaler, Rfl: 5    Cholecalciferol (HM Vitamin D3) 100 MCG (4000 UT) CAPS, 4000 units daily, Disp: 60 capsule, Rfl: 5    Contour Next Test test strip, Test 3x daily, Disp: 300 each, Rfl: 3    Diclofenac Sodium POWD, , Disp: , Rfl:     diltiazem (CARDIZEM CD) 240 mg 24 hr capsule, Take 2 capsules (480 mg total) by mouth daily, Disp: 180 capsule, Rfl: 1    ferrous sulfate 324 (65 Fe) mg, Take 1 tablet (324 mg total) by mouth every other day, Disp: 45 tablet, Rfl: 3    gabapentin (NEURONTIN) 100 mg capsule, Take 1 capsule in am and 2-3 capsules at night, Disp: 90 capsule, Rfl: 3    hydrochlorothiazide (HYDRODIURIL) 25 mg tablet, Take 1 tablet (25 mg total) by mouth daily, Disp: 90 tablet, Rfl: 1    Insulin Pen Needle (BD Pen Needle Tita U/F) 32G X 4 MM MISC, Use 2 (two) times a day, Disp: 200 each, Rfl: 1    Insulin Regular Human, Conc, (HumuLIN R U-500 KwikPen) 500 units/mL CONCENTRATED U-500 injection pen, 95 units before breakfast, 40 units before dinner, Disp: 12 pen, Rfl: 1    losartan (COZAAR) 100 MG tablet, Take 1 tablet (100 mg total) by mouth daily, Disp: 90 tablet, Rfl: 1    metFORMIN (GLUCOPHAGE-XR) 500 mg 24 hr tablet, Take 2 tablets (1,000 mg total) by mouth 2 (two) times a day with meals, Disp: 360 tablet, Rfl: 1    predniSONE 5 mg tablet, TAKE 2 TABLETS BY MOUTH EVERY DAY, Disp: 180 tablet, Rfl: 0    topiramate (TOPAMAX) 100 mg tablet, TAKE 1 TABLET BY MOUTH AT BEDTIME, Disp: 90 tablet, Rfl: 1    predniSONE 1 mg tablet, Combine to take 9 mg once daily  Decrease by 1 mg every 4 weeks  (Patient not taking: Reported on 5/18/2021), Disp: 360 tablet, Rfl: 1    Riboflavin 400 MG TABS, Take 1 tablet (400 mg total) by mouth daily (Patient not taking: Reported on 5/18/2021), Disp: 30 tablet, Rfl: 0    Review of Systems   Constitutional: Negative for activity change, appetite change, chills, diaphoresis, fatigue, fever and unexpected weight change  HENT: Negative for trouble swallowing and voice change  Eyes: Negative for visual disturbance     Respiratory: Positive for shortness of breath  Cardiovascular: Negative for chest pain and palpitations  Gastrointestinal: Negative for abdominal pain, constipation and diarrhea  Endocrine: Negative for cold intolerance, heat intolerance, polydipsia, polyphagia and polyuria  Genitourinary: Negative for frequency and menstrual problem  Musculoskeletal: Positive for arthralgias and gait problem  Negative for myalgias  Skin: Negative for rash  Allergic/Immunologic: Negative for food allergies  Neurological: Negative for dizziness and tremors  Hematological: Negative for adenopathy  Psychiatric/Behavioral: Negative for sleep disturbance  All other systems reviewed and are negative  Physical Exam:  Body mass index is 51 09 kg/m²  /70 (BP Location: Left arm, Patient Position: Sitting)   Pulse 98   Ht 5' 8" (1 727 m)   Wt (!) 152 kg (336 lb)   BMI 51 09 kg/m²    Wt Readings from Last 3 Encounters:   05/18/21 (!) 152 kg (336 lb)   04/29/21 (!) 152 kg (335 lb 9 6 oz)   03/26/21 (!) 156 kg (343 lb 3 oz)       Physical Exam  Vitals signs reviewed  Constitutional:       General: She is not in acute distress  Appearance: She is well-developed  HENT:      Head: Normocephalic and atraumatic  Eyes:      Conjunctiva/sclera: Conjunctivae normal       Pupils: Pupils are equal, round, and reactive to light  Neck:      Musculoskeletal: Normal range of motion and neck supple  Thyroid: No thyromegaly  Cardiovascular:      Rate and Rhythm: Normal rate and regular rhythm  Heart sounds: Normal heart sounds  Pulmonary:      Effort: Pulmonary effort is normal  No respiratory distress  Breath sounds: Normal breath sounds  No wheezing or rales  Abdominal:      General: Bowel sounds are normal  There is no distension  Palpations: Abdomen is soft  Tenderness: There is no abdominal tenderness  Musculoskeletal: Normal range of motion  Skin:     General: Skin is warm and dry     Neurological:      Mental Status: She is alert and oriented to person, place, and time  Labs:   Lab Results   Component Value Date    HGBA1C 10 1 (H) 03/10/2021    HGBA1C 12 0 (H) 05/04/2020    HGBA1C 9 3 (H) 12/06/2019     Lab Results   Component Value Date    CREATININE 1 04 05/07/2021    CREATININE 1 26 (H) 03/10/2021    CREATININE 1 04 05/04/2020    BUN 18 05/07/2021     01/04/2016    K 3 2 (L) 05/07/2021     05/07/2021    CO2 30 05/07/2021     eGFR   Date Value Ref Range Status   05/07/2021 67 >60 ml/min/1 73sq m Final     Lab Results   Component Value Date    CHOL 130 10/08/2015    HDL 45 05/07/2021    TRIG 115 05/07/2021     Lab Results   Component Value Date    ALT 18 05/07/2021    AST 18 05/07/2021    ALKPHOS 121 05/07/2021    BILITOT 0 69 01/04/2016     Lab Results   Component Value Date    NAL2FXISFJZS 0 737 03/10/2021    TDF3VENJAMDD 1 820 05/04/2020    QZB8CMNYAZUZ 0 736 12/06/2019     Lab Results   Component Value Date    FREET4 0 88 03/10/2021       Impression & Plan:    Problem List Items Addressed This Visit        Endocrine    Diabetes mellitus type 2 with complications, uncontrolled ([address] )     Diabetes poorly controlled based on last A1C Of 10 1 but control has improved since that time  She is having occasional hypoglycemia which she reports is due to increased activity or missed meals and occasional hypoglycemia after dinner  Discussed risks/treatment of hypoglycemia and need to keep glucose available at all times and especially when out for a walk  For now Advised her to Reduce U500 insulin to 95 units before breakfast and 40 before supper  She should reduce dose by 10 more units if she plans to do walking/stairclimbing  Will order Diagnostic Denisa CGM to be placed today after visit to evaluate blood sugar patterns and help with additional insulin adjustments     Lab Results   Component Value Date    HGBA1C 10 1 (H) 03/10/2021            Relevant Medications    Insulin Regular Human, Conc, (HumuLIN R U-500 KwikPen) 500 units/mL CONCENTRATED U-500 injection pen    Other Relevant Orders    Hemoglobin Z1Q    Basic metabolic panel    Continous glucose monitoring livier placement    Continous glucose monitoring livier intrepretation    Thyroid nodule     Advised her to complete ultrasound  Cardiovascular and Mediastinum    Benign hypertension     BP slightly high today but was 122/60 at recent office visit  Continue current regimen and will monitor  Essential hypertension - Primary       Other    Hyperlipidemia    Vitamin D deficiency     Continue supplements  Orders Placed This Encounter   Procedures    Continous glucose monitoring livier placement     Standing Status:   Future     Standing Expiration Date:   5/18/2022    Continous glucose monitoring livier intrepretation     Standing Status:   Future     Standing Expiration Date:   5/18/2022    Hemoglobin A1C     Standing Status:   Future     Standing Expiration Date:   5/18/2022    Basic metabolic panel     This is a patient instruction: Patient fasting for 8 hours or longer recommended  Standing Status:   Future     Standing Expiration Date:   5/18/2022       Patient Instructions   Please complete labs and thyroid ultrasound prior to next visit  Labs can be done after June 10th  If you are having frequent blood sugars less than 80 or any severe low blood sugars let us know  Please carry glucose when going for a walk  Reduce Insulin by another 10 units if you plan to exercise  Hypoglycemia instructions   Venice Wilburn  5/18/2021  [id number]    Low Blood Sugar    Steps to treat low blood sugar  1  Test blood sugar if you have symptoms of low blood sugar:   Low Blood Sugar Symptoms:  o Sweaty  o Dizzy  o Rapid heartbeat  o Shaky    o Bad mood  o Hungry      2   Treat blood sugar less than 70 with 15 grams of fast-acting carbohydrate:   Examples of 15 grams Fast-Acting Carbohydrate:  o 4 oz juice  o 4 oz regular soda  o 3-4 glucose tablets (chew)  o 3-4 hard candies (chew)              3    Wait 15 minutes and test your blood sugar again           4  If blood sugar is less than 100, repeat steps 2-3       5  When your blood sugar is 100 or more, eat a snack if it will be longer than one hour until your next meal  The snack should be 15 grams of carbohydrate and a protein:   Examples of snacks:  o ½ sandwich  o 6 crackers with cheese  o Piece of fruit with cheese or peanut butter  o 6 crackers with peanut butter          Discussed with the patient and all questioned fully answered  She will call me if any problems arise  Follow-up appointment in 3 months       Counseled patient on diagnostic results, prognosis, risk and benefit of treatment options, instruction for management, importance of treatment compliance, Risk  factor reduction and impressions    Guillermina Hines PA-C

## 2021-05-18 NOTE — ASSESSMENT & PLAN NOTE
BP slightly high today but was 122/60 at recent office visit  Continue current regimen and will monitor

## 2021-05-18 NOTE — ASSESSMENT & PLAN NOTE
Diabetes poorly controlled based on last A1C Of 10 1 but control has improved since that time  She is having occasional hypoglycemia which she reports is due to increased activity or missed meals and occasional hypoglycemia after dinner  Discussed risks/treatment of hypoglycemia and need to keep glucose available at all times and especially when out for a walk  For now Advised her to Reduce U500 insulin to 95 units before breakfast and 40 before supper  She should reduce dose by 10 more units if she plans to do walking/stairclimbing  Will order Diagnostic Denisa CGM to be placed today after visit to evaluate blood sugar patterns and help with additional insulin adjustments     Lab Results   Component Value Date    HGBA1C 10 1 (H) 03/10/2021

## 2021-05-18 NOTE — PATIENT INSTRUCTIONS
Please complete labs and thyroid ultrasound prior to next visit  Labs can be done after June 10th  If you are having frequent blood sugars less than 80 or any severe low blood sugars let us know  Please carry glucose when going for a walk  Reduce Insulin by another 10 units if you plan to exercise  Hypoglycemia instructions   Miguel A Minors  5/18/2021  [id number]    Low Blood Sugar    Steps to treat low blood sugar  1  Test blood sugar if you have symptoms of low blood sugar:   Low Blood Sugar Symptoms:  o Sweaty  o Dizzy  o Rapid heartbeat  o Shaky    o Bad mood  o Hungry      2  Treat blood sugar less than 70 with 15 grams of fast-acting carbohydrate:   Examples of 15 grams Fast-Acting Carbohydrate:  o 4 oz juice  o 4 oz regular soda  o 3-4 glucose tablets (chew)  o 3-4 hard candies (chew)              3    Wait 15 minutes and test your blood sugar again           4   If blood sugar is less than 100, repeat steps 2-3       5  When your blood sugar is 100 or more, eat a snack if it will be longer than one hour until your next meal  The snack should be 15 grams of carbohydrate and a protein:   Examples of snacks:  o ½ sandwich  o 6 crackers with cheese  o Piece of fruit with cheese or peanut butter  o 6 crackers with peanut butter

## 2021-05-25 ENCOUNTER — PATIENT OUTREACH (OUTPATIENT)
Dept: FAMILY MEDICINE CLINIC | Facility: CLINIC | Age: 61
End: 2021-05-25

## 2021-05-25 ENCOUNTER — TELEPHONE (OUTPATIENT)
Dept: ENDOCRINOLOGY | Facility: CLINIC | Age: 61
End: 2021-05-25

## 2021-05-25 NOTE — TELEPHONE ENCOUNTER
No problem-- if she would like she can come back in to have another sensor placed  If she is unable, please check BG 4x per day and send log for review

## 2021-05-25 NOTE — TELEPHONE ENCOUNTER
Patient left a message on the machine that her sensor fell off after her appt on 5/18/21  She does not know where or when  She is sorry that it took so long to report it  Please advise      05/18/2021 Juliette Wu  10/20/2021 Dr Siobhan Sprague

## 2021-05-25 NOTE — PROGRESS NOTES
I called the patient to follow up  She states she is doing well  She recently had an Endo appointment where insulin changes were made  The patient reports her blood sugars have been stable, mostly in the 100's  She does not note any further hypoglycemic episodes  The patient states her sensor fell off her arm and she cannot find it  She will be calling Endo after our call ends  The patient has been exercising on a regular basis which I congratulated her on  She has been doing the stairs in her apartment on hot or rainy days and on nice days she goes for walks outside  She admits in the past she had her "cheats" but now has "no desire" and "no appeal" to eat bad food choices  She reports she also lost weight which I congratulated on as well  The patient noted on one occasion while walking outside she was too exhausted to continue and had to call her son to come and get her  No reports of hypoglycemia but I did suggest she purchase glucose tablets to carry with her just in case since they are very convenient for travel  She states she will be buying a jeffery pack to keep essential with her on her walks  The patient mentioned this month is Mental Health Awareness Month and her son, who is 39, has "challenges"  In the past he had attended group therapy and she would like for him to resume (because of Covid these sessions were stopped)  She notes he is a patient here at [facility] but it probably has been awhile since he was seen  I suggested he schedule an appointment and referrals can be made if appropriate  The patient is aware of her Ortho appointment for tomorrow as well as her Endo appointment on 6/1  I will contact her around 6/10 to have labs drawn per Endo

## 2021-05-26 ENCOUNTER — OFFICE VISIT (OUTPATIENT)
Dept: OBGYN CLINIC | Facility: CLINIC | Age: 61
End: 2021-05-26
Payer: MEDICARE

## 2021-05-26 VITALS
HEART RATE: 82 BPM | SYSTOLIC BLOOD PRESSURE: 142 MMHG | HEIGHT: 68 IN | WEIGHT: 293 LBS | DIASTOLIC BLOOD PRESSURE: 83 MMHG | BODY MASS INDEX: 44.41 KG/M2

## 2021-05-26 DIAGNOSIS — G56.21 ULNAR NEUROPATHY AT ELBOW, RIGHT: ICD-10-CM

## 2021-05-26 PROCEDURE — 99204 OFFICE O/P NEW MOD 45 MIN: CPT | Performed by: SURGERY

## 2021-05-26 NOTE — PROGRESS NOTES
Belinda REYES  Attending, Orthopaedic Surgery  Hand, Wrist, and Elbow Surgery  [facility]      ORTHOPAEDIC HAND, WRIST, AND ELBOW OFFICE  VISIT       ASSESSMENT/PLAN:      15-year-old female  With right ulna neuropathy Confirmed on EMG and similar symptoms on the left side  Patient is currently not a surgical candidate for a  Ulnar nerve decompression due to her hemoglobin A1c being 10  1  she is aware her hemoglobin A1c needs to be a 8 0 or below to have surgery  Will be referring patient did hand therapy for ulnar nerve glides and custom splints  Also recommend patient to use child size knee pads on her elbows at night  The patient verbalized understanding of exam findings and treatment plan  We engaged in the shared decision-making process and treatment options were discussed at length with the patient  Surgical and conservative management discussed today along with risks and benefits  Diagnoses and all orders for this visit:    Ulnar neuropathy at elbow, right  -     Ambulatory referral to Orthopedic Surgery  -     Ambulatory referral to PT/OT hand therapy; Future        Follow Up:  Return if symptoms worsen or fail to improve  To Do Next Visit:  Re-evaluation of current issue      General Discussions:  Cubital Tunnel Syndrome: The anatomy and physiology of cubital tunnel syndrome were discussed with the patient today in the office  Typically, increased elbow flexion activities decrease blood flow within the intraneural spaces, resulting in a feeling of numbness, tingling, weakness, or clumsiness within the hand and fingers  Occasionally, anatomic structures such as medial elbow osteophytes, the medial head of the triceps, were subluxing ulnar nerve may result in increased pressure or aggravation at the cubital tunnel  Typical signs and symptoms usually include numbness and tingling within the ring and small finger, weakness with , and weakness with pinch  Conservative treatment and includes nocturnal bracing to keep the elbow in a semi-extended position, activity modification, therapy, and avoiding excessive elbow flexion activities  Vitamin B6 one tablet daily over the counter may helpful to reduce symptoms  A majority of patients typically respond to conservative treatment over a period of approximately 3-6 months  EMG/NCV testing of the ulnar nerve at the elbow is not as reliable as carpal tunnel syndrome  Surgical intervention in the form of in situ release of the ulnar nerve at the elbow or ulnar nerve transposition may be required in up to 20% of patients            ____________________________________________________________________________________________________________________________________________      CHIEF COMPLAINT:  Chief Complaint   Patient presents with    Right Elbow - Pain    Right Hand - Numbness       SUBJECTIVE:  Gwen Wright is a 56y o  year old RHD female who presents today for evaluation for right ulnar neuropathy  She was referred by her rheumatologist Lin Mckenna  She has been diagnosed  with polymyalgia rheumatica  She states in October 2019 she started having left hand swelling and was ordered hand therapy for two months and states she continues with the home exercises  She states her right pinky feels  completely numb and feels weak  She had an EMG on 03/22/2021 which showed right ulna neuropathy        Pain/symptom timing:  Worse during the day when active  Pain/symptom context:  Worse with activites and work  Pain/symptom modifying factors:  Rest makes better, activities make worse  Pain/symptom associated signs/symptoms: none    Prior treatment   · NSAIDsNo   · Injections No   · Bracing/Orthotics No    Physical Therapy Yes     I have personally reviewed all the relevant PMH, PSH, SH, FH, Medications and allergies      PAST MEDICAL HISTORY:  Past Medical History:   Diagnosis Date    Anemia     Arthritis     Diabetes mellitus (HCC)     Dyspnea on exertion     Hyperlipidemia     Hypertension     Legally blind 2010    Mild intermittent asthma with exacerbation 8/4/2018    Miscarriage     x 3    Seizures (HCC)     Sickle cell trait ([address] )     Sleep apnea     Thyroid nodule 3/6/2020       PAST SURGICAL HISTORY:  Past Surgical History:   Procedure Laterality Date    CATARACT EXTRACTION Bilateral     august and september    EYE SURGERY      HYSTERECTOMY      42    OOPHORECTOMY Left     37    LA TEMPORAL ARTERY LIGATN OR BX Bilateral 10/17/2019    Procedure: BIOPSY ARTERY TEMPORAL;  Surgeon: Gabriel Martinez DO;  Location: [facility];  Service: Vascular    US GUIDED THYROID BIOPSY  5/13/2020       FAMILY HISTORY:  Family History   Problem Relation Age of Onset    Heart attack Mother     Heart disease Mother     Hypertension Mother     No Known Problems Father     Heart disease Sister     No Known Problems Brother     No Known Problems Son     No Known Problems Daughter     Heart attack Maternal Grandmother     Heart disease Maternal Grandmother     Diabetes Other     Heart attack Other     No Known Problems Sister     No Known Problems Sister     No Known Problems Paternal Aunt     No Known Problems Son     Cancer Neg Hx     Stroke Neg Hx        SOCIAL HISTORY:  Social History     Tobacco Use    Smoking status: Never Smoker    Smokeless tobacco: Never Used   Substance Use Topics    Alcohol use: Never     Alcohol/week: 0 0 standard drinks     Frequency: Never     Drinks per session: Patient refused     Binge frequency: Never    Drug use: No       MEDICATIONS:    Current Outpatient Medications:     albuterol (Proventil HFA) 90 mcg/act inhaler, Inhale 2 puffs every 6 (six) hours as needed for wheezing For another 24 hours use every 4 hours standing, Disp: 6 7 g, Rfl: 0    aspirin (ADULT ASPIRIN EC LOW STRENGTH) 81 mg EC tablet, Take 81 mg by mouth daily , Disp: , Rfl:     atorvastatin (LIPITOR) 40 mg tablet, Take 1 tablet (40 mg total) by mouth daily, Disp: 90 tablet, Rfl: 1    SARAH MICROLET LANCETS lancets, Inject 1 applicator into the skin 4 (four) times a day, Disp: , Rfl:     Blood Glucose Monitoring Suppl (Contour Next Monitor) w/Device KIT, Disp 1 meter Dx; E11 9, Disp: 1 kit, Rfl: 1    Blood Pressure Monitor KIT, Check blood pressures BID, Disp: 1 each, Rfl: 0    budesonide-formoterol (SYMBICORT) 80-4 5 MCG/ACT inhaler, Inhale 2 puffs 2 (two) times a day Rinse mouth after use , Disp: 1 Inhaler, Rfl: 5    Cholecalciferol (HM Vitamin D3) 100 MCG (4000 UT) CAPS, 4000 units daily, Disp: 60 capsule, Rfl: 5    Contour Next Test test strip, Test 3x daily, Disp: 300 each, Rfl: 3    Diclofenac Sodium POWD, , Disp: , Rfl:     diltiazem (CARDIZEM CD) 240 mg 24 hr capsule, Take 2 capsules (480 mg total) by mouth daily, Disp: 180 capsule, Rfl: 1    ferrous sulfate 324 (65 Fe) mg, Take 1 tablet (324 mg total) by mouth every other day, Disp: 45 tablet, Rfl: 3    gabapentin (NEURONTIN) 100 mg capsule, Take 1 capsule in am and 2-3 capsules at night, Disp: 90 capsule, Rfl: 3    hydrochlorothiazide (HYDRODIURIL) 25 mg tablet, Take 1 tablet (25 mg total) by mouth daily, Disp: 90 tablet, Rfl: 1    Insulin Pen Needle (BD Pen Needle Tita U/F) 32G X 4 MM MISC, Use 2 (two) times a day, Disp: 200 each, Rfl: 1    Insulin Regular Human, Conc, (HumuLIN R U-500 KwikPen) 500 units/mL CONCENTRATED U-500 injection pen, 95 units before breakfast, 40 units before dinner, Disp: 12 pen, Rfl: 1    losartan (COZAAR) 100 MG tablet, Take 1 tablet (100 mg total) by mouth daily, Disp: 90 tablet, Rfl: 1    metFORMIN (GLUCOPHAGE-XR) 500 mg 24 hr tablet, Take 2 tablets (1,000 mg total) by mouth 2 (two) times a day with meals, Disp: 360 tablet, Rfl: 1    predniSONE 1 mg tablet, Combine to take 9 mg once daily  Decrease by 1 mg every 4 weeks  , Disp: 360 tablet, Rfl: 1    predniSONE 5 mg tablet, TAKE 2 TABLETS BY MOUTH EVERY DAY, Disp: 180 tablet, Rfl: 0    Riboflavin 400 MG TABS, Take 1 tablet (400 mg total) by mouth daily, Disp: 30 tablet, Rfl: 0    topiramate (TOPAMAX) 100 mg tablet, TAKE 1 TABLET BY MOUTH AT BEDTIME, Disp: 90 tablet, Rfl: 1    ALLERGIES:  No Known Allergies        REVIEW OF SYSTEMS:  Review of Systems    VITALS:  Vitals:    05/26/21 1101   BP: 142/83   Pulse: 82       LABS:  HgA1c:   Lab Results   Component Value Date    HGBA1C 10 1 (H) 03/10/2021     BMP:   Lab Results   Component Value Date    GLUCOSE 135 01/04/2016    CALCIUM 10 1 05/07/2021     01/04/2016    K 3 2 (L) 05/07/2021    CO2 30 05/07/2021     05/07/2021    BUN 18 05/07/2021    CREATININE 1 04 05/07/2021       _____________________________________________________  PHYSICAL EXAMINATION:  General: well developed and well nourished, alert, oriented times 3 and appears comfortable  Psychiatric: Normal  HEENT: Normocephalic, Atraumatic Trachea Midline, No torticollis  Pulmonary: No audible wheezing or respiratory distress   Abdomen/GI: Non tender, non distended   Cardiovascular: No pitting edema, 2+ radial pulse   Skin: No masses, erythema, lacerations, fluctation, ulcerations  Neurovascular: Sensation Intact to the Median, Ulnar, Radial Nerve, Motor Intact to the Median, Ulnar, Radial Nerve and Pulses Intact  Musculoskeletal: Normal, except as noted in detailed exam and in HPI        MUSCULOSKELETAL EXAMINATION:  Right elbow   Difficulty with crossover test   FDP strength 4+/5    positive Tinel's at the elbow as well as positive compression test bilaterally   no intrinsic wasting   no clawing    ___________________________________________________  STUDIES REVIEWED:   EMG results on 3/22/2021 shows right ulnar neuropathy           PROCEDURES PERFORMED:  Procedures  No Procedures performed today    _____________________________________________________      Scribe Attestation    I,:  Gregoria Brian am acting as a scribe while in the presence of the attending physician :       I,:  Ximena Samayoa MD personally performed the services described in this documentation    as scribed in my presence :

## 2021-06-09 ENCOUNTER — PATIENT OUTREACH (OUTPATIENT)
Dept: FAMILY MEDICINE CLINIC | Facility: CLINIC | Age: 61
End: 2021-06-09

## 2021-06-09 NOTE — PROGRESS NOTES
I called the patient to remind her to have labs drawn after 6/10 per Endo  She was also provided with Central Scheduling's phone number to schedule her thyroid ultrasound  I will continue to follow

## 2021-06-15 ENCOUNTER — HOSPITAL ENCOUNTER (OUTPATIENT)
Dept: ULTRASOUND IMAGING | Facility: HOSPITAL | Age: 61
Discharge: HOME | End: 2021-06-15
Payer: MEDICARE

## 2021-06-15 DIAGNOSIS — E04.1 THYROID NODULE: ICD-10-CM

## 2021-06-15 PROCEDURE — 76536 US EXAM OF HEAD AND NECK: CPT

## 2021-07-09 ENCOUNTER — PATIENT OUTREACH (OUTPATIENT)
Dept: FAMILY MEDICINE CLINIC | Facility: CLINIC | Age: 61
End: 2021-07-09

## 2021-07-09 NOTE — PROGRESS NOTES
I called the patient but received her voicemail  Message was left stating I will reach out again next week  Chart reviewed

## 2021-07-13 ENCOUNTER — PATIENT OUTREACH (OUTPATIENT)
Dept: FAMILY MEDICINE CLINIC | Facility: CLINIC | Age: 61
End: 2021-07-13

## 2021-07-13 NOTE — PROGRESS NOTES
I called the patient to follow up  She states she has been feeling "fine"  She states her blood sugars have been "ok" but has not been checking them since she ran out of strips  She has not had them refilled because she is having financial trouble  She states the cost of strips is ~$6 but cannot afford it  She notes she is keeping a strict watch on her diet  The patient states she has to pick and choose what to pay for since her budget is so tight  The patient states she continues to have issues with Social Security  She notes they continue to give her extensions on receiving any money  She has called their office numerous times but cannot get any resolution  I suggested to go to the local SS office and she agreed this might be a good idea  The patient also notes she has tried to apply for a "special SS for the blind" without progress  She states she contacted a legal office who stated they cannot help her  The patient spoke at great length regarding these issues  The patient states she is relieved to have 3 month supplies of her medications as this is helping her have sufficient supply for the current time  The patient notes she has continued exercising although states difficulty with the hot/humid weather  She does go up and down the stairs but notes it is humid there as well  She states her breathing is good going up the stairs but she has some trouble descending because of her vision  She also reports some "back pressure, discomfort" that she believes is from her hernia  She states her legs are strong and her endurance has improved  The patient has discontinued using her CPAP she has received a recall notice  The patient is aware of needing labs drawn but did not want to go out in the hot weather  I provided her the number to the [facility] to schedule an appointment  I reminded the patient of her Rheumatology appointment in 2 weeks; she plans on attending    I will continue to follow

## 2021-07-21 DIAGNOSIS — E78.49 OTHER HYPERLIPIDEMIA: ICD-10-CM

## 2021-07-21 DIAGNOSIS — IMO0002 UNCONTROLLED TYPE 2 DIABETES MELLITUS WITH OPHTHALMIC COMPLICATION, WITH LONG-TERM CURRENT USE OF INSULIN: ICD-10-CM

## 2021-07-21 DIAGNOSIS — G43.709 CHRONIC MIGRAINE WITHOUT AURA WITHOUT STATUS MIGRAINOSUS, NOT INTRACTABLE: ICD-10-CM

## 2021-07-21 DIAGNOSIS — I10 ESSENTIAL HYPERTENSION: ICD-10-CM

## 2021-07-22 RX ORDER — ATORVASTATIN CALCIUM 40 MG/1
TABLET, FILM COATED ORAL
Qty: 90 TABLET | Refills: 3 | Status: SHIPPED | OUTPATIENT
Start: 2021-07-22

## 2021-07-22 RX ORDER — HYDROCHLOROTHIAZIDE 25 MG/1
TABLET ORAL
Qty: 90 TABLET | Refills: 3 | Status: SHIPPED | OUTPATIENT
Start: 2021-07-22 | End: 2022-04-27

## 2021-07-22 RX ORDER — TOPIRAMATE 100 MG/1
TABLET, FILM COATED ORAL
Qty: 90 TABLET | Refills: 3 | Status: SHIPPED | OUTPATIENT
Start: 2021-07-22

## 2021-07-22 RX ORDER — LOSARTAN POTASSIUM 100 MG/1
TABLET ORAL
Qty: 90 TABLET | Refills: 3 | Status: SHIPPED | OUTPATIENT
Start: 2021-07-22

## 2021-07-22 RX ORDER — METFORMIN HYDROCHLORIDE 500 MG/1
TABLET, EXTENDED RELEASE ORAL
Qty: 360 TABLET | Refills: 3 | Status: SHIPPED | OUTPATIENT
Start: 2021-07-22

## 2021-07-23 ENCOUNTER — APPOINTMENT (OUTPATIENT)
Dept: LAB | Facility: HOSPITAL | Age: 61
End: 2021-07-23
Payer: MEDICARE

## 2021-07-23 ENCOUNTER — PATIENT OUTREACH (OUTPATIENT)
Dept: FAMILY MEDICINE CLINIC | Facility: CLINIC | Age: 61
End: 2021-07-23

## 2021-07-23 DIAGNOSIS — IMO0002 DIABETES MELLITUS TYPE 2 WITH COMPLICATIONS, UNCONTROLLED: ICD-10-CM

## 2021-07-23 LAB
ANION GAP SERPL CALCULATED.3IONS-SCNC: 8 MMOL/L (ref 5–14)
BUN SERPL-MCNC: 23 MG/DL (ref 5–25)
CALCIUM SERPL-MCNC: 10 MG/DL (ref 8.4–10.2)
CHLORIDE SERPL-SCNC: 103 MMOL/L (ref 97–108)
CO2 SERPL-SCNC: 32 MMOL/L (ref 22–30)
CREAT SERPL-MCNC: 1.04 MG/DL (ref 0.6–1.2)
EST. AVERAGE GLUCOSE BLD GHB EST-MCNC: 189 MG/DL
GFR SERPL CREATININE-BSD FRML MDRD: 58 ML/MIN/1.73SQ M
GLUCOSE P FAST SERPL-MCNC: 89 MG/DL (ref 70–99)
HBA1C MFR BLD: 8.2 %
POTASSIUM SERPL-SCNC: 3.5 MMOL/L (ref 3.6–5)
SODIUM SERPL-SCNC: 143 MMOL/L (ref 137–147)

## 2021-07-23 PROCEDURE — 83036 HEMOGLOBIN GLYCOSYLATED A1C: CPT

## 2021-07-23 PROCEDURE — 36415 COLL VENOUS BLD VENIPUNCTURE: CPT

## 2021-07-23 PROCEDURE — 80048 BASIC METABOLIC PNL TOTAL CA: CPT

## 2021-07-23 NOTE — PROGRESS NOTES
I received a call from the patient stating she had her labs drawn this morning  She notes recently she has been "bottoming out" before 5 am and had to decrease her insulin  She states she usually did not eat anything after her dinner, ~8 pm, but became symptomatic  She notes she had a snack before midnight which helped  She is anticipating her lab results hoping they have improved since she states she has been "behaving myself"  I will continue to follow

## 2021-07-26 ENCOUNTER — TELEPHONE (OUTPATIENT)
Dept: ENDOCRINOLOGY | Facility: CLINIC | Age: 61
End: 2021-07-26

## 2021-07-26 NOTE — PROGRESS NOTES
Assessment and Plan:   Ms Laureano Sheriff a 49-year-old  female with history significant for polymyalgia rheumatica, insulin-dependent diabetes mellitus, with complications related to retinopathy causing legal blindness bilaterally, asthma, osteoarthritis, microcytic anemia of unclear etiology and morbid obesity, who presents for follow up of an episode involving bilateral arm pain/stiffness and swelling, associated with significantly elevated inflammatory markers with a CRP greater than 90 and an ESR of greater than 130, thought to be secondary to PMR  Jenelle Medina is currently on prednisone 10 mg once daily       # Polymyalgia rheumatica  - Edie presents today for follow-up of significantly elevated inflammatory markers, as well as abrupt onset of symptoms related to bilateral upper extremity pain/stiffness/swelling, with mild symptoms of pain noted in her pelvic girdle region, for which she was initially evaluated in October 2019   Other than persistent headaches also experienced over the past 1 year, she does not complain of additional concerning symptoms on her review of systems   Her physical examination has also not been revealing, and further evaluation such as autoimmune serologies, a bilateral temporal artery ultrasound with temporal artery biopsies, and a CT of her chest/abdomen and pelvis have all been unrevealing  Alivia Jaime overall presentation was thought to be consistent with polymyalgia rheumatica   She states after few days of taking the initial steroids she noticed a significant improvement in her overall joint pains, swelling and stiffness, and has not had any major recurrences of the symptoms     - She is currently maintained on prednisone at 10 mg once daily and has overall done well on this dose  She was unable to start this steroid taper due to financial reasons and issues with picking up her prednisone script    I advised her that we will continue with the same plan of decreasing the prednisone by 1 mg every 4 weeks  It is reassuring to note that symptom wise she has been doing well and with the last labs checked in May 2021 there was a decline noted in the inflammatory markers  This will be rechecked today  If she notices a recurrence of symptoms with doing the taper I advised her to contact me  If she continues to be resistant to a steroid taper we may need to consider adding on steroid sparing medications such as methotrexate      # Chronic steroid use  - She is aware of the side effects associated with chronic steroid use including but not limited to, risk for infections, cataracts/glaucoma, hypertension, worsening diabetic control, gastritis, osteoporosis and avascular necrosis   I advised her to continue daily calcium and vitamin-D supplements   She did undergo a DEXA scan in January 2020 which was normal      # Neuropathy of her extremities  - This is likely secondary to diabetic neuropathy  To further address the ulnar neuropathy detected on the right upper extremity EMG/NCS study I referred her to Orthopedics and she will follow up with them as needed  Plan:  Diagnoses and all orders for this visit:    Polymyalgia rheumatica ([place] [id number] )  -     C-reactive protein; Future  -     Sedimentation rate, automated; Future  -     predniSONE 1 mg tablet; Combine to take 9 mg once daily  Decrease by 1 mg every 4 weeks  -     predniSONE 5 mg tablet; Combine to take 9 mg once daily  Decrease by 1 mg every 4 weeks      Current chronic use of systemic steroids    Ulnar neuropathy at elbow, right    Insulin dependent type 2 diabetes mellitus (HCC)    Class 3 severe obesity without serious comorbidity with body mass index (BMI) of 50 0 to 59 9 in adult, unspecified obesity type ([facility] )    Other orders  -     magnesium oxide (MAG-OX) 400 mg tablet; magnesium oxide 400 mg (241 3 mg magnesium) tablet   TAKE 1 TABLET BY MOUTH EVERY DAY  -     urea 40 % LOTN lotion; urea 40 % lotion   APPLY TO THE AFFECTED AREA(S) BY TOPICAL ROUTE 2 TIMES PER DAY      Activities as tolerated  Exercise: try to maintain a low impact exercise regimen as much as possible  Walk for 30 minutes a day for at least 3 days a week  Continue other medications as prescribed by PCP and other specialists  RTC in 6 months          HPI    INITIAL VISIT NOTE:  Ms Kelli Goodson a 40-year-old  female with history significant for insulin-dependent diabetes mellitus, with complications related to retinopathy causing legal blindness bilaterally, asthma, osteoarthritis, microcytic anemia of unclear etiology and morbid obesity, who presents for further evaluation of an episode involving bilateral arm pain/stiffness and swelling, associated with significantly elevated inflammatory markers with a CRP greater than 90 and an ESR of greater than 130   She is referred by Dr Cano Shown a rheumatology consult      Patient reports in the first week of September 2019, she woke up one morning with sudden onset of pain and stiffness affecting her bilateral arms, to such an extent that she was unable to move them even slightly   This was also associated with a weakness sensation of her lower legs   As her symptoms did not improve within the week she was seen in the emergency room and admitted for further evaluation   Due to the visible swelling an x-ray of her hand and forearm were initially done which only showed dorsal soft tissue swelling   An upper extremity venous duplex ruled out evidence of a deep vein thrombosis   A CTA of her right upper extremity was essentially unremarkable except for edema noted in the subcutaneous fat of the distal forearm and hand although without a vascular etiology   The aortic arch and distal arteries were unremarkable      Further lab workup showed a leukocytosis of 14, with a chronic microcytic anemia with stable hemoglobin of 9, as well as thrombocytosis with platelets ranging from 500-700   Mild transaminitis was seen which eventually resolved, although she continued to have an elevated alkaline phosphatase which is still persistent   An ESR was found to be elevated at greater than 130, with a C reactive protein of greater than 90   An JUAN screen was positive at 1:80 homogeneous pattern   A hepatitis C antibody was found to be equivocal   An anti cardiolipin IgM antibody was mildly elevated at 13   TSH, blood cultures, uric acid, urinalysis, CK, rheumatoid factor, anti CCP antibody, anti neutrophilic cytoplasmic antibody, lupus anticoagulant, Lyme disease PCR, Sjogren's antibodies, SPEP, total complement, beta 2 glycoprotein antibodies, paraneoplastic profile, anti double-stranded DNA antibody, LDH, beta 2 microglobulin, immunoglobulin assay, and direct Gerri test were unremarkable      She reports during the hospital stay she declined the use of steroids due to the insulin-dependent diabetes, or pain medication use   Her symptoms were managed with Tylenol as needed, as well as therapy   She was also evaluated by vascular surgery during her hospital stay, with the workup being unrevealing   She was discharged home and advised to follow-up with Rheumatology for further evaluation   There was a concern for temporal arteritis versus polymyalgia rheumatica given the abrupt onset of her symptoms as well as the elevated inflammatory markers   She reports the majority of her symptoms have since resolved, and lasted for an entire duration of approximately 2 weeks   She did have follow-up labs done on September 30th which showed a persistently elevated ESR of 121, but with the down trending CRP of 23 2      Patient reports since December 2018 she has been experiencing new onset headaches which were diagnosed as migraine headaches by Neurology  Dangelo Roman states that the headache is sometimes felt at the back of her head or over her entire head, but is not usually limited to one side   She was recently seen in the emergency room for the headaches as well and no acute findings were noted on an MRI brain are MRA head   The MRI of her brain did show cerebral atrophy more than expected for the patient's age  Franklin Ke were also changes that could be consistent with mild chronic white matter microangiopathy versus sequelae of chronic migraine disease   A CTA head and neck was also unremarkable      She reports a few months ago her weight was 336 lb, and today she is presenting with a weight of 304 lb   She thinks this may be related to a decreased appetite   When her symptoms started at the beginning of September she did experience jaw pain on the right side which self-resolved   She does report a history of multiple miscarriages   She denies fevers, night sweats, scalp tenderness/pain, new vision changes (she does have a history of retinopathy related to insulin-dependent diabetes mellitus, and states she is legally blind in both eyes), current jaw claudication, hair loss, sicca symptoms, unexplained skin rashes, psoriasis, photosensitivity, mouth/nose ulcers, epistaxis, recurrent sinus disease, swollen glands, pleuritic chest pain, hemoptysis (she does have a chronic history of shortness of breath and cough related to asthma), abdominal pain, vomiting, diarrhea, blood in stools, blood clots, Raynaud's, focal weakness or family history of autoimmune disease   Her sister may have some type of arthritis      She is up-to-date with a mammogram, and states Pap smears were discontinued as she has had a hysterectomy  [facility] has never had a colonoscopy         11/6/2019:  Patient presents for a follow-up today  Haris Alcocer reviewed the ultrasound done of her bilateral temporal arteries which was unrevealing   A bilateral temporal artery biopsy did not show any inflammatory changes   Her ESR and CRP were still elevated at 95 and 20 7, respectively   Her hemoglobin was stable at 9 3   Her anti smooth muscle antibody was mildly elevated at 25   Her C3 and C4 were also mildly elevated   The remainder of the CBC, CMP (except for the alkaline phosphatase), SPEP, immunofixation, CK, aldolase, HCV RNA, HIV, ferritin, anti mitochondrial antibody, urinalysis and urine protein creatinine ratio were unremarkable      She reports since the last office visit she has had approximately 4 flare-ups of pain and stiffness affecting her predominantly from the bilateral elbows up to her shoulders   She states that this has been occurring on a weekly basis, but it does take days to resolve and overlaps with the next flare she has  Jose Guadalupe Rahman recalls in the past she has also experienced pain in her bilateral hip region, but states currently her predominant symptoms have been occurring from the elbows upwards  Melia Bobo on occasion will she experience a discomfort from her wrists radiating into her hands  Jose Guadalupe Rahman denies any new complaints of joint pains or swelling      Since the last office visit she has also been experiencing increasing redness of her bilateral eyes   She did see her ophthalmologist a few weeks ago and apparently there was no inflammation detected  Jose Guadalupe Rahman is scheduled for a follow-up on November 20th      She has also been having worsening headaches which she feels in a bandlike fashion across her head  Jose Guadalupe Rahman has never been evaluated by Neurology         12/4/2019:  Patient presents for a follow-up of polymyalgia rheumatica  Jose Guadalupe Rahman is currently on oral prednisone 20 mg once daily   She did not have a chance to do her labs following the prior office visit      She reports a few days after starting the prednisone at 20 mg once daily she started to notice a significant improvement in her joint pains and swelling   She states it has also temporarily been helping with the headaches as well as the vision changes and redness of her eyes   As far she is aware she has never been told of an inflammatory eye disorder by her ophthalmologist  Jose Guadalupe Rahman reports with activities such as vacuuming and cleaning the house this has also been helping with her joint pain, but the improvement has all been amplified after starting the prednisone   Symptomatically she has been tolerating the steroids well, but on a few occasions has noticed blood sugar readings in the 400s to 500s   She has not contacted her endocrinologist with this information, and they are not aware that she has been started on steroids   She overall denies any joint pains, swelling or stiffness, but states on occasion with changes in the weather and the cooler temperatures she may feel some achiness      She continues to experience the headaches, and is scheduled for a neurology evaluation in February 2020   She was recently seen by Hematology and her lab abnormalities were all thought to be related to a reactive process   No other complaints noted at this time         1/8/2020:  Patient presents for a follow-up of polymyalgia rheumatica  Shikha Herring is currently on oral prednisone 15 mg once daily   I reviewed her labs done yesterday which showed an ESR of 59 which had improved, but her C reactive protein continues to be elevated at 38  5      At the last office visit we had decreased her prednisone dose from 20-15 mg once daily   She mentions that there has been no significant change in her symptoms with doing so and she is tolerating the steroid taper well   Dependent on the weather she may have some achiness around her shoulder region, but otherwise denies any significant flare-ups of joint pain, swelling or stiffness   She is tolerating the prednisone well as well   She continues to check her blood sugars 3-4 times daily, and is in touch with her endocrinologist regarding the hyperglycemia   They did increase her insulin regimen      She will be following up with her ophthalmologist at the end of January   She continues to experience the eye redness and blurred vision  Shikha Herring has an appointment with Neurology next month as she continues to experience the headaches         2/19/2020:  Patient presents for follow-up of polymyalgia rheumatica  Opal Hercules is currently on prednisone 10 mg once daily      At the last office visit she was on 15 mg once daily, and we had discussed tapering by 2 5 mg every 2 weeks   She states at a dose of 12 5 mg once daily she started to notice a slight recurrence of pain mostly affecting her shoulders and right wrist, but this change worsened even further when she decreased to 10 mg once daily   She has been on this dose for approximately 2 weeks now  Opal Hercules states that she is still able to exercise and work through the pain in her shoulders, but it has also affected her right wrist and very occasionally in her bilateral groin region   She has noticed mild swelling of her right shoulder without any other swollen joints   She is not really experiencing morning stiffness      She has had issues with significantly high blood sugar readings occasionally greater than 600   She is working closely with endocrinology to adjust her insulin regimen   In view of this she is very hesitant to increase her dose of the steroids again      She was recently seen by Neurology in view of the chronic headaches and this is thought to possibly be related to uncontrolled hypertension         4/1/2020:  Patient presents for a follow-up of polymyalgia rheumatica  Opal Hercules is currently on prednisone 8 mg once daily   I reviewed her ESR and CRP done following the last office visit which were down trending at 51 and 20 9, respectively      Patient reports following our last office visit in mid February she decreased her prednisone dose to 9 mg once daily and had been on that for the past 1 month   She further decreased her dose to 8 mg once daily today   She mentions while decreasing her prednisone dose from 10-9 mg once daily, she noticed a slight flare-up of pain and swelling affecting her right wrist, as well as intermittent pain affecting her right shoulder   She will also notice occasional pain affecting her bilateral knees, but this could be related to osteoarthritis   No other significant joint pains, swelling or stiffness noted with the steroid decrease   She denies any new headaches, vision changes or jaw claudication   She is continuing follow-up with neurology for the headaches she has been experiencing  Hao Marc is also scheduled for an EEG next month      She has been careful with monitoring her blood pressures as well as blood sugars, and states that with adjustments to her insulin regimen her blood sugars are mostly under 200 at this time         9/23/2020:  Patient presents for a follow-up of polymyalgia rheumatica  Hao Marc is currently on prednisone 6 mg once daily   She has not had any recent labs done      Since the last office visit she has been tapering by 1 mg every 2-3 months   She has been on the 6 mg once daily since August   She has not had any issues with the steroid taper   She states at one point of time she was having a flare-up of pain affecting her right wrist but this has resolved and she is currently denying any significant issues related to joint pains   She does feel like she has slight swelling and puffiness in her bilateral forearm region  Hao Marc is not really experiencing any morning stiffness   She has noticed a numbness sensation of her right hand 5th digit which is bothersome to her      No other complaints noted today   She has been monitoring her blood sugars and follows with endocrinology for management of the insulin         4/28/2021:  Patient presents for a follow-up of polymyalgia rheumatica  She is currently on prednisone 10 mg once daily  The labs checked in September 2020 showed an elevated ESR and CRP of 95 and 28 4, respectively  She has not been seen for a follow-up since then    I also reviewed the EMG/NCS study of her right upper extremity which showed ulnar neuropathy      At the last office visit she was on prednisone at 6 mg once daily and was tapering by 1 mg every 1-2 months  She reports when she was down to a dose of 5 mg once daily her overall body pain and stiffness significantly worsened requiring her to use a walker  In view of this she increase the prednisone back to 10 mg once daily and has been on this dose consistently for the past few months  This significantly helped with improving her symptoms  Currently she mostly describes a sensation of stiffness in her hands as well as the numbness in her bilateral 5th digits  No significant joint pains  She describes an achiness throughout her legs as well as numbness in her feet and occasional leg swelling  She has otherwise been tolerating the prednisone well without any concerns for side effects or infections        She is following with endocrinology for management of the diabetes and states that this has been much better controlled with changing her insulin regimen      Overall she has been feeling quite well and has started volunteering programs  7/28/2021:  Patient presents for a follow-up of polymyalgia rheumatica  She is currently on prednisone 10 mg once daily  The labs checked in May 2021 showed an elevated ESR and CRP of 64 and 20 2, respectively  She reports since the last office visit she did not start the steroid taper as she was unable to get refills on the prednisone due to financial reasons  She has maintained on prednisone at 10 mg once daily and overall states she has been doing well  She has also been focused on a healthy lifestyle with changing her diet and initiating an exercise regimen which is overall contributing to her well-being  Her blood sugars have been better controlled and her A1c has reduced      The following portions of the patient's history were reviewed and updated as appropriate: allergies, current medications, past family history, past medical history, past social history, past surgical history and problem list       Review of Systems  Constitutional: Negative for weight change, fevers, chills, night sweats, fatigue  ENT/Mouth: Negative for hearing changes, ear pain, nasal congestion, sinus pain, hoarseness, sore throat, rhinorrhea, swallowing difficulty  Eyes: Negative for pain, redness, discharge, vision changes  Cardiovascular: Negative for chest pain, SOB, palpitations  Respiratory: Negative for cough, sputum, wheezing, dyspnea  Gastrointestinal: Negative for nausea, vomiting, diarrhea, constipation, pain, heartburn  Genitourinary: Negative for dysuria, urinary frequency, hematuria  Musculoskeletal: As per HPI  Skin: Negative for skin rash, color changes  Neuro: Negative for weakness, numbness, tingling, loss of consciousness  Psych: Negative for anxiety, depression  Heme/Lymph: Negative for easy bruising, bleeding, lymphadenopathy          Past Medical History:   Diagnosis Date    Anemia     Arthritis     Diabetes mellitus (HCC)     Dyspnea on exertion     Hyperlipidemia     Hypertension     Legally blind 2010    Mild intermittent asthma with exacerbation 8/4/2018    Miscarriage     x 3    Seizures (HCC)     Sickle cell trait ([facility] [address] )     Sleep apnea     Thyroid nodule 3/6/2020       Past Surgical History:   Procedure Laterality Date    CATARACT EXTRACTION Bilateral     august and september    EYE SURGERY      HYSTERECTOMY      37    OOPHORECTOMY Left     37    GA TEMPORAL ARTERY LIGATN OR BX Bilateral 10/17/2019    Procedure: BIOPSY ARTERY TEMPORAL;  Surgeon: Trung Kaba DO;  Location: [facility];  Service: Vascular    US GUIDED THYROID BIOPSY  5/13/2020       Social History     Socioeconomic History    Marital status: /Civil Union     Spouse name: Not on file    Number of children: Not on file    Years of education: Not on file    Highest education level: Not on file   Occupational History    Occupation: long term disability   Tobacco Use    Smoking status: Never Smoker    Smokeless tobacco: Never Used   Vaping Use    Vaping Use: Never used   Substance and Sexual Activity    Alcohol use: Never     Alcohol/week: 0 0 standard drinks    Drug use: No    Sexual activity: Not Currently     Partners: Male     Birth control/protection: None   Other Topics Concern    Not on file   Social History Narrative    Caffeine use    Resides with spouse and one son     Social Determinants of Health     Financial Resource Strain: Low Risk     Difficulty of Paying Living Expenses: Not very hard   Food Insecurity: Food Insecurity Present    Worried About Running Out of Food in the Last Year: Sometimes true    Masood of Food in the Last Year: Sometimes true   Transportation Needs: No Transportation Needs    Lack of Transportation (Medical): No    Lack of Transportation (Non-Medical):  No   Physical Activity:     Days of Exercise per Week:     Minutes of Exercise per Session:    Stress:     Feeling of Stress :    Social Connections:     Frequency of Communication with Friends and Family:     Frequency of Social Gatherings with Friends and Family:     Attends [employer] Services:     Active Member of Clubs or Organizations:     Attends Club or Organization Meetings:     Marital Status:    Intimate Partner Violence:     Fear of Current or Ex-Partner:     Emotionally Abused:     Physically Abused:     Sexually Abused:        Family History   Problem Relation Age of Onset    Heart attack Mother     Heart disease Mother     Hypertension Mother     No Known Problems Father     Heart disease Sister     No Known Problems Brother     No Known Problems Son     No Known Problems Daughter     Heart attack Maternal Grandmother     Heart disease Maternal Grandmother     Diabetes Other     Heart attack Other     No Known Problems Sister     No Known Problems Sister     No Known Problems Paternal Aunt     No Known Problems Son     Cancer Neg Hx     Stroke Neg Hx        No Known Allergies      Current Outpatient Medications:     albuterol (Proventil HFA) 90 mcg/act inhaler, Inhale 2 puffs every 6 (six) hours as needed for wheezing For another 24 hours use every 4 hours standing, Disp: 6 7 g, Rfl: 0    aspirin (ADULT ASPIRIN EC LOW STRENGTH) 81 mg EC tablet, Take 81 mg by mouth daily , Disp: , Rfl:     atorvastatin (LIPITOR) 40 mg tablet, TAKE 1 TABLET BY MOUTH  DAILY, Disp: 90 tablet, Rfl: 3    SARAH MICROLET LANCETS lancets, Inject 1 applicator into the skin 4 (four) times a day, Disp: , Rfl:     Blood Glucose Monitoring Suppl (Contour Next Monitor) w/Device KIT, Disp 1 meter Dx; E11 9, Disp: 1 kit, Rfl: 1    Blood Pressure Monitor KIT, Check blood pressures BID, Disp: 1 each, Rfl: 0    budesonide-formoterol (SYMBICORT) 80-4 5 MCG/ACT inhaler, Inhale 2 puffs 2 (two) times a day Rinse mouth after use , Disp: 1 Inhaler, Rfl: 5    Cholecalciferol (HM Vitamin D3) 100 MCG (4000 UT) CAPS, 4000 units daily, Disp: 60 capsule, Rfl: 5    Contour Next Test test strip, Test 3x daily, Disp: 300 each, Rfl: 3    Diclofenac Sodium POWD, , Disp: , Rfl:     diltiazem (CARDIZEM CD) 240 mg 24 hr capsule, Take 2 capsules (480 mg total) by mouth daily, Disp: 180 capsule, Rfl: 1    ferrous sulfate 324 (65 Fe) mg, Take 1 tablet (324 mg total) by mouth every other day, Disp: 45 tablet, Rfl: 3    gabapentin (NEURONTIN) 100 mg capsule, Take 1 capsule in am and 2-3 capsules at night, Disp: 90 capsule, Rfl: 3    hydrochlorothiazide (HYDRODIURIL) 25 mg tablet, TAKE 1 TABLET BY MOUTH  DAILY, Disp: 90 tablet, Rfl: 3    Insulin Pen Needle (BD Pen Needle Tita U/F) 32G X 4 MM MISC, Use 2 (two) times a day, Disp: 200 each, Rfl: 1    Insulin Regular Human, Conc, (HumuLIN R U-500 KwikPen) 500 units/mL CONCENTRATED U-500 injection pen, 95 units before breakfast, 40 units before dinner, Disp: 12 pen, Rfl: 1    losartan (COZAAR) 100 MG tablet, TAKE 1 TABLET BY MOUTH  DAILY, Disp: 90 tablet, Rfl: 3    magnesium oxide (MAG-OX) 400 mg tablet, magnesium oxide 400 mg (241 3 mg magnesium) tablet  TAKE 1 TABLET BY MOUTH EVERY DAY, Disp: , Rfl:     metFORMIN (GLUCOPHAGE-XR) 500 mg 24 hr tablet, TAKE 2 TABLETS BY MOUTH  TWICE DAILY WITH MEALS, Disp: 360 tablet, Rfl: 3    predniSONE 1 mg tablet, Combine to take 9 mg once daily  Decrease by 1 mg every 4 weeks  , Disp: 360 tablet, Rfl: 1    predniSONE 5 mg tablet, Combine to take 9 mg once daily  Decrease by 1 mg every 4 weeks  , Disp: 100 tablet, Rfl: 1    Riboflavin 400 MG TABS, Take 1 tablet (400 mg total) by mouth daily, Disp: 30 tablet, Rfl: 0    topiramate (TOPAMAX) 100 mg tablet, TAKE 1 TABLET BY MOUTH AT  BEDTIME, Disp: 90 tablet, Rfl: 3    urea 40 % LOTN lotion, urea 40 % lotion  APPLY TO THE AFFECTED AREA(S) BY TOPICAL ROUTE 2 TIMES PER DAY, Disp: , Rfl:       Objective:    Vitals:    07/28/21 1131   BP: 142/82   Pulse: 100       Physical Exam  General: Well appearing, well nourished, in no distress  Oriented x 3, normal mood and affect  Ambulating with aid of a cane  Skin: Good turgor, no rash, unusual bruising or prominent lesions  Hair: Normal texture and distribution  Nails: Normal color, no deformities  HEENT:  Head: Normocephalic, atraumatic  Eyes: Conjunctiva clear, sclera non-icteric, EOM intact  Extremities: No amputations or deformities, cyanosis, edema  Musculoskeletal: No swelling visualized  Neurologic: Alert and oriented  No focal neurological deficits appreciated  Psychiatric: Normal mood and affect  DOC Jackson    Rheumatology

## 2021-07-26 NOTE — TELEPHONE ENCOUNTER
----- Message from Sydni Alvarez PA-C sent at 7/26/2021  8:09 AM EDT -----    Reviewed most recent set of lab work  A1c has improved to 8 2, but is still slightly above goal at this time  We would like her to be under 7  Potassium is slightly low at this time, and kidney function remains stable

## 2021-07-28 ENCOUNTER — OFFICE VISIT (OUTPATIENT)
Dept: RHEUMATOLOGY | Facility: CLINIC | Age: 61
End: 2021-07-28
Payer: MEDICARE

## 2021-07-28 ENCOUNTER — APPOINTMENT (OUTPATIENT)
Dept: LAB | Facility: CLINIC | Age: 61
End: 2021-07-28
Payer: MEDICARE

## 2021-07-28 VITALS — HEART RATE: 100 BPM | SYSTOLIC BLOOD PRESSURE: 142 MMHG | DIASTOLIC BLOOD PRESSURE: 82 MMHG

## 2021-07-28 DIAGNOSIS — Z79.52 CURRENT CHRONIC USE OF SYSTEMIC STEROIDS: ICD-10-CM

## 2021-07-28 DIAGNOSIS — M35.3 POLYMYALGIA RHEUMATICA (HCC): Primary | ICD-10-CM

## 2021-07-28 DIAGNOSIS — G56.21 ULNAR NEUROPATHY AT ELBOW, RIGHT: ICD-10-CM

## 2021-07-28 DIAGNOSIS — E11.9 INSULIN DEPENDENT TYPE 2 DIABETES MELLITUS (HCC): ICD-10-CM

## 2021-07-28 DIAGNOSIS — Z79.4 INSULIN DEPENDENT TYPE 2 DIABETES MELLITUS (HCC): ICD-10-CM

## 2021-07-28 DIAGNOSIS — E66.01 CLASS 3 SEVERE OBESITY WITHOUT SERIOUS COMORBIDITY WITH BODY MASS INDEX (BMI) OF 50.0 TO 59.9 IN ADULT, UNSPECIFIED OBESITY TYPE (HCC): ICD-10-CM

## 2021-07-28 DIAGNOSIS — M35.3 POLYMYALGIA RHEUMATICA (HCC): ICD-10-CM

## 2021-07-28 LAB
CRP SERPL QL: 29.2 MG/L
ERYTHROCYTE [SEDIMENTATION RATE] IN BLOOD: 71 MM/HOUR (ref 0–29)

## 2021-07-28 PROCEDURE — 99215 OFFICE O/P EST HI 40 MIN: CPT | Performed by: INTERNAL MEDICINE

## 2021-07-28 PROCEDURE — 86140 C-REACTIVE PROTEIN: CPT

## 2021-07-28 PROCEDURE — 85652 RBC SED RATE AUTOMATED: CPT

## 2021-07-28 PROCEDURE — 36415 COLL VENOUS BLD VENIPUNCTURE: CPT

## 2021-07-28 RX ORDER — PREDNISONE 1 MG/1
TABLET ORAL
Qty: 100 TABLET | Refills: 1 | Status: SHIPPED | OUTPATIENT
Start: 2021-07-28 | End: 2022-03-16

## 2021-07-28 RX ORDER — UREA 40 G/100G
LOTION TOPICAL
COMMUNITY

## 2021-07-28 RX ORDER — MAGNESIUM OXIDE 400 MG/1
TABLET ORAL
COMMUNITY

## 2021-07-28 RX ORDER — PREDNISONE 1 MG/1
TABLET ORAL
Qty: 360 TABLET | Refills: 1 | Status: SHIPPED | OUTPATIENT
Start: 2021-07-28

## 2021-08-04 DIAGNOSIS — IMO0002 DIABETES MELLITUS TYPE 2 WITH COMPLICATIONS, UNCONTROLLED: ICD-10-CM

## 2021-08-05 RX ORDER — INSULIN HUMAN 500 [IU]/ML
INJECTION, SOLUTION SUBCUTANEOUS
Qty: 36 ML | Refills: 3 | Status: SHIPPED | OUTPATIENT
Start: 2021-08-05 | End: 2021-10-20 | Stop reason: SDUPTHER

## 2021-08-23 ENCOUNTER — PATIENT OUTREACH (OUTPATIENT)
Dept: FAMILY MEDICINE CLINIC | Facility: CLINIC | Age: 61
End: 2021-08-23

## 2021-08-23 NOTE — PROGRESS NOTES
I called the patient to follow up  She states she has been unable to exercise outdoors because of the heat and humidity  She notes she has been doing the stairs and stretching as well  The patient reports she started decreasing her prednisone and is currently at 9 mg daily and will decrease to 8 mg next month  The patient notes concern when she reaches 5 mg daily as this is when she "usually runs in to trouble"  The patient states she has not been checking her blood sugars because she did not have the money to buy test strips  She believes she has enough money now to purchase them  The patient notes she is watching her diet as best she can  She notes she should be receiving money from [facility] in December  She states her phone service was cut that she cannot place outgoing calls  The patient notes she would like to receive the Covid booster and continues to take precautions  The patient states she needs a Medicare annual visit; will send a note to clerical   I also asked the patient to think about which colon cancer screening test she would prefer (FIT vs cologuard per PCP 5/4 note)  She will discuss with PCP at her next appointment  I will continue to follow

## 2021-09-09 ENCOUNTER — TELEPHONE (OUTPATIENT)
Dept: NEUROLOGY | Facility: CLINIC | Age: 61
End: 2021-09-09

## 2021-09-09 NOTE — TELEPHONE ENCOUNTER
Called pt to confirm upcoming appointment with [facility] on 9/16  No answer, left voicemail

## 2021-09-16 ENCOUNTER — OFFICE VISIT (OUTPATIENT)
Dept: NEUROLOGY | Facility: CLINIC | Age: 61
End: 2021-09-16
Payer: MEDICARE

## 2021-09-16 VITALS
WEIGHT: 293 LBS | HEART RATE: 105 BPM | BODY MASS INDEX: 44.41 KG/M2 | HEIGHT: 68 IN | DIASTOLIC BLOOD PRESSURE: 82 MMHG | SYSTOLIC BLOOD PRESSURE: 152 MMHG | TEMPERATURE: 97.5 F

## 2021-09-16 DIAGNOSIS — G43.709 CHRONIC MIGRAINE WITHOUT AURA WITHOUT STATUS MIGRAINOSUS, NOT INTRACTABLE: ICD-10-CM

## 2021-09-16 DIAGNOSIS — H54.3 VISUAL IMPAIRMENT IN BOTH EYES: ICD-10-CM

## 2021-09-16 DIAGNOSIS — R26.81 GAIT INSTABILITY: Primary | ICD-10-CM

## 2021-09-16 DIAGNOSIS — G47.33 OBSTRUCTIVE SLEEP APNEA: ICD-10-CM

## 2021-09-16 DIAGNOSIS — M35.3 POLYMYALGIA RHEUMATICA (HCC): ICD-10-CM

## 2021-09-16 PROCEDURE — 99213 OFFICE O/P EST LOW 20 MIN: CPT | Performed by: PHYSICIAN ASSISTANT

## 2021-09-27 ENCOUNTER — PATIENT OUTREACH (OUTPATIENT)
Dept: FAMILY MEDICINE CLINIC | Facility: CLINIC | Age: 61
End: 2021-09-27

## 2021-09-27 NOTE — PROGRESS NOTES
I called the patient to remind her of her PCP appointment for tomorrow, arrival at 230  The patient plans on walking to the appointment noting she will take breaks as needed and she will have water with her  The patient plans on receiving her flu shot at the appointment tomorrow  The patient states she continues to walk the stairs as well as walking outdoors  The patient questioned when her next A1C is due and I informed her it is not until late October  I wished her a happy birthday  I will continue to follow

## 2021-09-28 ENCOUNTER — OFFICE VISIT (OUTPATIENT)
Dept: FAMILY MEDICINE CLINIC | Facility: CLINIC | Age: 61
End: 2021-09-28

## 2021-09-28 VITALS
TEMPERATURE: 97.1 F | OXYGEN SATURATION: 96 % | HEART RATE: 111 BPM | WEIGHT: 293 LBS | DIASTOLIC BLOOD PRESSURE: 90 MMHG | BODY MASS INDEX: 44.41 KG/M2 | SYSTOLIC BLOOD PRESSURE: 144 MMHG | HEIGHT: 68 IN | RESPIRATION RATE: 20 BRPM

## 2021-09-28 DIAGNOSIS — Z23 ENCOUNTER FOR IMMUNIZATION: ICD-10-CM

## 2021-09-28 DIAGNOSIS — I10 ESSENTIAL HYPERTENSION: Primary | ICD-10-CM

## 2021-09-28 DIAGNOSIS — E66.01 CLASS 3 SEVERE OBESITY DUE TO EXCESS CALORIES WITH SERIOUS COMORBIDITY AND BODY MASS INDEX (BMI) OF 50.0 TO 59.9 IN ADULT (HCC): ICD-10-CM

## 2021-09-28 DIAGNOSIS — E11.319 DIABETIC RETINOPATHY ASSOCIATED WITH TYPE 2 DIABETES MELLITUS, MACULAR EDEMA PRESENCE UNSPECIFIED, UNSPECIFIED LATERALITY, UNSPECIFIED RETINOPATHY SEVERITY (HCC): ICD-10-CM

## 2021-09-28 DIAGNOSIS — I10 BENIGN HYPERTENSION: ICD-10-CM

## 2021-09-28 DIAGNOSIS — Z23 NEED FOR SHINGLES VACCINE: ICD-10-CM

## 2021-09-28 DIAGNOSIS — G63 POLYNEUROPATHY ASSOCIATED WITH UNDERLYING DISEASE (HCC): ICD-10-CM

## 2021-09-28 DIAGNOSIS — Z00.00 ENCOUNTER FOR ANNUAL WELLNESS VISIT (AWV) IN MEDICARE PATIENT: ICD-10-CM

## 2021-09-28 DIAGNOSIS — Z12.11 SCREEN FOR COLON CANCER: ICD-10-CM

## 2021-09-28 PROCEDURE — 90682 RIV4 VACC RECOMBINANT DNA IM: CPT | Performed by: PHYSICIAN ASSISTANT

## 2021-09-28 PROCEDURE — G0439 PPPS, SUBSEQ VISIT: HCPCS | Performed by: PHYSICIAN ASSISTANT

## 2021-09-28 PROCEDURE — 99214 OFFICE O/P EST MOD 30 MIN: CPT | Performed by: PHYSICIAN ASSISTANT

## 2021-09-28 PROCEDURE — G0008 ADMIN INFLUENZA VIRUS VAC: HCPCS | Performed by: PHYSICIAN ASSISTANT

## 2021-09-28 RX ORDER — ZOSTER VACCINE RECOMBINANT, ADJUVANTED 50 MCG/0.5
0.5 KIT INTRAMUSCULAR ONCE
Qty: 1 EACH | Refills: 1 | Status: SHIPPED | OUTPATIENT
Start: 2021-09-28 | End: 2021-09-28

## 2021-09-28 RX ORDER — DILTIAZEM HYDROCHLORIDE 240 MG/1
480 CAPSULE, COATED, EXTENDED RELEASE ORAL DAILY
Qty: 180 CAPSULE | Refills: 1 | Status: SHIPPED | OUTPATIENT
Start: 2021-09-28

## 2021-09-28 NOTE — PROGRESS NOTES
Assessment and Plan:     Problem List Items Addressed This Visit        Cardiovascular and Mediastinum    Essential hypertension - Primary    Relevant Medications    diltiazem (CARDIZEM CD) 240 mg 24 hr capsule      Other Visit Diagnoses     Encounter for immunization        Relevant Orders    influenza vaccine, quadrivalent, recombinant, PF, 0 5 mL, for patients 18 yr+ (FLUBLOK) (Completed)    Need for shingles vaccine        Screen for colon cancer        Relevant Orders    Cologuard    Diabetic retinopathy associated with type 2 diabetes mellitus, macular edema presence unspecified, unspecified laterality, unspecified retinopathy severity ([address] )        Relevant Orders    Ambulatory referral to Ophthalmology    Encounter for annual wellness visit (AWV) in Medicare patient               Preventive health issues were discussed with patient, and age appropriate screening tests were ordered as noted in patient's After Visit Summary  Personalized health advice and appropriate referrals for health education or preventive services given if needed, as noted in patient's After Visit Summary       History of Present Illness:     Patient presents for Medicare Annual Wellness visit    Patient Care Team:  Micky Eller PA-C as PCP - General (Family Medicine)  Cory Hough MD as PCP - Endocrinology (Endocrinology)  MD Iain Beatty RN as Lead OP Care Mgr     Problem List:     Patient Active Problem List   Diagnosis    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, with long-term current use of insulin ([address] )    Benign hypertension    Seizures ([address] )    Exertional dyspnea    Enlarged pulmonary artery ([address] )    Aortic dilatation (HCC)    Anemia    Decreased pulses in feet    Diabetes mellitus type 2 with complications, uncontrolled ([address] )    Hyperlipidemia    Microalbuminuria    Obstructive sleep apnea    Class 3 severe obesity with serious comorbidity and body mass index (BMI) of 45 0 to 49 9 in adult [facility])    Neuropathy of hand, right    Prolonged QT interval    Visual impairment in both eyes    Vitamin D deficiency    Lung nodules    Orthostatic hypotension    Mild intermittent asthma with exacerbation    Type 2 diabetes mellitus with microalbuminuria, with long-term current use of insulin (HCC)    Frequent headaches    Other inflammatory and immune myopathies, not elsewhere classified    Transaminitis    Morbid obesity ([facility]    Polyneuropathy associated with underlying disease ([facility] )    PMR (polymyalgia rheumatica) ([facility] )    Thrombocytosis ([facility] )    Left cavernous carotid aneurysm    Type 2 diabetes mellitus with hyperglycemia, with long-term current use of insulin (HCC)    Aneurysm (HCC)    Thyroid nodule    History of absence seizures    Hypersomnia    Migraine without aura and without status migrainosus, not intractable    Cerebral aneurysm without rupture    Chronic migraine without aura without status migrainosus, not intractable    Diabetes mellitus ([facility] )    Essential hypertension    Depressed mood    Blurry vision, bilateral    Ulnar neuropathy of right upper extremity      Past Medical and Surgical History:     Past Medical History:   Diagnosis Date    Anemia     Arthritis     Diabetes mellitus ([address] )     Dyspnea on exertion     Hyperlipidemia     Hypertension     Legally blind 2010    Mild intermittent asthma with exacerbation 8/4/2018    Miscarriage     x 3    Seizures (HCC)     Sickle cell trait ([facility] )     Sleep apnea     Thyroid nodule 3/6/2020     Past Surgical History:   Procedure Laterality Date    CATARACT EXTRACTION Bilateral     august and september    EYE SURGERY      HYSTERECTOMY      37    OOPHORECTOMY Left     37    CT TEMPORAL ARTERY LIGATN OR BX Bilateral 10/17/2019    Procedure: BIOPSY ARTERY TEMPORAL;  Surgeon: Sarah Gordillo DO;  Location: [facility];  Service: Vascular    US GUIDED THYROID BIOPSY  5/13/2020 Family History:     Family History   Problem Relation Age of Onset    Heart attack Mother     Heart disease Mother     Hypertension Mother     No Known Problems Father     Heart disease Sister     No Known Problems Brother     No Known Problems Son     No Known Problems Daughter     Heart attack Maternal Grandmother     Heart disease Maternal Grandmother     Diabetes Other     Heart attack Other     No Known Problems Sister     No Known Problems Sister     No Known Problems Paternal Aunt     No Known Problems Son     Cancer Neg Hx     Stroke Neg Hx       Social History:     Social History     Socioeconomic History    Marital status: /Civil Union     Spouse name: None    Number of children: None    Years of education: None    Highest education level: None   Occupational History    Occupation: long term disability   Tobacco Use    Smoking status: Never Smoker    Smokeless tobacco: Never Used   Vaping Use    Vaping Use: Never used   Substance and Sexual Activity    Alcohol use: Never     Alcohol/week: 0 0 standard drinks    Drug use: No    Sexual activity: Not Currently     Partners: Male     Birth control/protection: None   Other Topics Concern    None   Social History Narrative    Caffeine use    Resides with spouse and one son     Social Determinants of Health     Financial Resource Strain:     Difficulty of Paying Living Expenses:    Food Insecurity:     Worried About Running Out of Food in the Last Year:     [address] in the Last Year:    Transportation Needs:     Lack of Transportation (Medical):      Lack of Transportation (Non-Medical):    Physical Activity:     Days of Exercise per Week:     Minutes of Exercise per Session:    Stress:     Feeling of Stress :    Social Connections:     Frequency of Communication with Friends and Family:     Frequency of Social Gatherings with Friends and Family:     Attends Spiritism Services:     Active Member of Clubs or Organizations:     Attends Club or Organization Meetings:     Marital Status:    Intimate Partner Violence:     Fear of Current or Ex-Partner:     Emotionally Abused:     Physically Abused:     Sexually Abused:       Medications and Allergies:     Current Outpatient Medications   Medication Sig Dispense Refill    albuterol (Proventil HFA) 90 mcg/act inhaler Inhale 2 puffs every 6 (six) hours as needed for wheezing For another 24 hours use every 4 hours standing 6 7 g 0    aspirin (ADULT ASPIRIN EC LOW STRENGTH) 81 mg EC tablet Take 81 mg by mouth daily       atorvastatin (LIPITOR) 40 mg tablet TAKE 1 TABLET BY MOUTH  DAILY 90 tablet 3    SARAH MICROLET LANCETS lancets Inject 1 applicator into the skin 4 (four) times a day      Blood Glucose Monitoring Suppl (Contour Next Monitor) w/Device KIT Disp 1 meter Dx; E11 9 1 kit 1    Blood Pressure Monitor KIT Check blood pressures BID 1 each 0    budesonide-formoterol (SYMBICORT) 80-4 5 MCG/ACT inhaler Inhale 2 puffs 2 (two) times a day Rinse mouth after use   1 Inhaler 5    Cholecalciferol (HM Vitamin D3) 100 MCG (4000 UT) CAPS 4000 units daily 60 capsule 5    Contour Next Test test strip Test 3x daily 300 each 3    Diclofenac Sodium POWD       diltiazem (CARDIZEM CD) 240 mg 24 hr capsule Take 2 capsules (480 mg total) by mouth daily 180 capsule 1    ferrous sulfate 324 (65 Fe) mg Take 1 tablet (324 mg total) by mouth every other day 45 tablet 3    gabapentin (NEURONTIN) 100 mg capsule Take 1 capsule in am and 2-3 capsules at night 90 capsule 3    hydrochlorothiazide (HYDRODIURIL) 25 mg tablet TAKE 1 TABLET BY MOUTH  DAILY 90 tablet 3    Insulin Regular Human, Conc, (HumuLIN R U-500 KwikPen) 500 units/mL CONCENTRATED U-500 injection pen INJECT SUBCUTANEOUSLY 110  UNITS BEFORE BREAKFAST AND  60 UNITS BEFORE DINNER 36 mL 3    losartan (COZAAR) 100 MG tablet TAKE 1 TABLET BY MOUTH  DAILY 90 tablet 3    magnesium oxide (MAG-OX) 400 mg tablet magnesium oxide 400 mg (241 3 mg magnesium) tablet   TAKE 1 TABLET BY MOUTH EVERY DAY      metFORMIN (GLUCOPHAGE-XR) 500 mg 24 hr tablet TAKE 2 TABLETS BY MOUTH  TWICE DAILY WITH MEALS 360 tablet 3    predniSONE 1 mg tablet Combine to take 9 mg once daily  Decrease by 1 mg every 4 weeks  360 tablet 1    predniSONE 5 mg tablet Combine to take 9 mg once daily  Decrease by 1 mg every 4 weeks  (Patient taking differently: Combine to take 9 mg once daily  Decrease by 1 mg every 4 weeks  Pt is now on 8mg  For this month ) 100 tablet 1    Riboflavin 400 MG TABS Take 1 tablet (400 mg total) by mouth daily 30 tablet 0    topiramate (TOPAMAX) 100 mg tablet TAKE 1 TABLET BY MOUTH AT  BEDTIME 90 tablet 3    urea 40 % LOTN lotion urea 40 % lotion   APPLY TO THE AFFECTED AREA(S) BY TOPICAL ROUTE 2 TIMES PER DAY      Insulin Pen Needle (BD Pen Needle Tita U/F) 32G X 4 MM MISC Use 2 (two) times a day 200 each 1     No current facility-administered medications for this visit       No Known Allergies   Immunizations:     Immunization History   Administered Date(s) Administered    INFLUENZA 12/14/2014, 02/08/2018    Influenza Quadrivalent 3 years and older 09/01/2020    Influenza Quadrivalent Preservative Free 3 years and older IM 02/08/2018    Influenza, recombinant, quadrivalent,injectable, preservative free 10/12/2018, 09/30/2019, 10/06/2020, 09/28/2021    Pneumococcal Polysaccharide PPV23 02/08/2018    SARS-CoV-2 / COVID-19 mRNA IM (Moderna) 03/23/2021, 04/22/2021    Tuberculin Skin Test-PPD Intradermal 09/25/2019      Health Maintenance:         Topic Date Due    Cervical Cancer Screening  Never done    Breast Cancer Screening: Mammogram  07/18/2020    Colorectal Cancer Screening  09/21/2020    HIV Screening  Completed    Hepatitis C Screening  Completed         Topic Date Due    DTaP,Tdap,and Td Vaccines (1 - Tdap) Never done      Medicare Health Risk Assessment:     /90 (BP Location: Left arm, Patient Position: Sitting, Cuff Size: Large)   Pulse (!) 111   Temp (!) 97 1 °F (36 2 °C) (Temporal)   Resp 20   Ht 5' 8" (1 727 m)   Wt (!) 150 kg (330 lb)   SpO2 96%   Breastfeeding No   BMI 50 18 kg/m²      Gabriella Borja is here for her Subsequent Wellness visit  Health Risk Assessment:   Patient rates overall health as good  Patient feels that their physical health rating is same  Patient is dissatisfied with their life  Eyesight was rated as slightly worse  Hearing was rated as same  Patient feels that their emotional and mental health rating is slightly worse  Patients states they are never, rarely angry  Patient states they are sometimes unusually tired/fatigued  Pain experienced in the last 7 days has been none  Patient states that she has experienced no weight loss or gain in last 6 months  Depression Screening:   PHQ-2 Score: 0      Fall Risk Screening: In the past year, patient has experienced: no history of falling in past year      Urinary Incontinence Screening:   Patient has not leaked urine accidently in the last six months  Home Safety:  Patient has trouble with stairs inside or outside of their home  Patient has working smoke alarms and has working carbon monoxide detector  Home safety hazards include: none  Nutrition:   Current diet is Diabetic  Medications:   Patient is not currently taking any over-the-counter supplements  Patient is able to manage medications  Activities of Daily Living (ADLs)/Instrumental Activities of Daily Living (IADLs):   Walk and transfer into and out of bed and chair?: Yes  Dress and groom yourself?: Yes    Bathe or shower yourself?: Yes    Feed yourself?  Yes  Do your laundry/housekeeping?: Yes  Manage your money, pay your bills and track your expenses?: Yes  Make your own meals?: Yes    Do your own shopping?: Yes    Previous Hospitalizations:   Any hospitalizations or ED visits within the last 12 months?: No      Advance Care Planning:   Living will: No Durable POA for healthcare: No    Advanced directive: No    Advanced directive counseling given: Yes    Five wishes given: Yes    Patient declined ACP directive: No      Cognitive Screening:   Provider or family/friend/caregiver concerned regarding cognition?: No    PREVENTIVE SCREENINGS      Cardiovascular Screening:    General: Screening Not Indicated and History Lipid Disorder      Diabetes Screening:     General: Screening Not Indicated and History Diabetes      Colorectal Cancer Screening:     General: Risks and Benefits Discussed    Due for: Cologuard      Breast Cancer Screening:     General: Risks and Benefits Discussed and Screening Not Indicated    Due for: Mammogram        Cervical Cancer Screening:    General: Screening Not Indicated      Osteoporosis Screening:    General: Risks and Benefits Discussed    Due for: DXA Axial      Abdominal Aortic Aneurysm (AAA) Screening:        General: Screening Not Indicated      Lung Cancer Screening:     General: Screening Not Indicated      Hepatitis C Screening:    General: Screening Current    Screening, Brief Intervention, and Referral to Treatment (SBIRT)    Screening  Typical number of drinks in a day: 0  Typical number of drinks in a week: 0  Interpretation: Low risk drinking behavior      Single Item Drug Screening:  How often have you used an illegal drug (including marijuana) or a prescription medication for non-medical reasons in the past year? never    Single Item Drug Screen Score: 0  Interpretation: Negative screen for possible drug use disorder      Anita Doty PA-C

## 2021-09-28 NOTE — PATIENT INSTRUCTIONS
Medicare Preventive Visit Patient Instructions  Thank you for completing your Welcome to Medicare Visit or Medicare Annual Wellness Visit today  Your next wellness visit will be due in one year (9/29/2022)  The screening/preventive services that you may require over the next 5-10 years are detailed below  Some tests may not apply to you based off risk factors and/or age  Screening tests ordered at today's visit but not completed yet may show as past due  Also, please note that scanned in results may not display below  Preventive Screenings:  Service Recommendations Previous Testing/Comments   Colorectal Cancer Screening  * Colonoscopy    * Fecal Occult Blood Test (FOBT)/Fecal Immunochemical Test (FIT)  * Fecal DNA/Cologuard Test  * Flexible Sigmoidoscopy Age: 54-65 years old   Colonoscopy: every 10 years (may be performed more frequently if at higher risk)  OR  FOBT/FIT: every 1 year  OR  Cologuard: every 3 years  OR  Sigmoidoscopy: every 5 years  Screening may be recommended earlier than age 48 if at higher risk for colorectal cancer  Also, an individualized decision between you and your healthcare provider will decide whether screening between the ages of 74-80 would be appropriate  Colonoscopy: Not on file  FOBT/FIT: 09/21/2019  Cologuard: Not on file  Sigmoidoscopy: Not on file          Breast Cancer Screening Age: 36 years old  Frequency: every 1-2 years  Not required if history of left and right mastectomy Mammogram: 07/18/2019        Cervical Cancer Screening Between the ages of 21-29, pap smear recommended once every 3 years  Between the ages of 33-67, can perform pap smear with HPV co-testing every 5 years     Recommendations may differ for women with a history of total hysterectomy, cervical cancer, or abnormal pap smears in past  Pap Smear: Not on file        Hepatitis C Screening Once for adults born between [facility]  More frequently in patients at high risk for Hepatitis C Hep C Antibody: 10/09/2019    Screening Current   Diabetes Screening 1-2 times per year if you're at risk for diabetes or have pre-diabetes Fasting glucose: 89 mg/dL   A1C: 8 2 %    Screening Not Indicated  History Diabetes   Cholesterol Screening Once every 5 years if you don't have a lipid disorder  May order more often based on risk factors  Lipid panel: 05/07/2021    Screening Not Indicated  History Lipid Disorder     Other Preventive Screenings Covered by Medicare:  1  Abdominal Aortic Aneurysm (AAA) Screening: covered once if your at risk  You're considered to be at risk if you have a family history of AAA  2  Lung Cancer Screening: covers low dose CT scan once per year if you meet all of the following conditions: (1) Age 50-69; (2) No signs or symptoms of lung cancer; (3) Current smoker or have quit smoking within the last 15 years; (4) You have a tobacco smoking history of at least 30 pack years (packs per day multiplied by number of years you smoked); (5) You get a written order from a healthcare provider  3  Glaucoma Screening: covered annually if you're considered high risk: (1) You have diabetes OR (2) Family history of glaucoma OR (3)  aged 48 and older OR (3)  American aged 72 and older  3  Osteoporosis Screening: covered every 2 years if you meet one of the following conditions: (1) You're estrogen deficient and at risk for osteoporosis based off medical history and other findings; (2) Have a vertebral abnormality; (3) On glucocorticoid therapy for more than 3 months; (4) Have primary hyperparathyroidism; (5) On osteoporosis medications and need to assess response to drug therapy  · Last bone density test (DXA Scan): 01/17/2020   5  HIV Screening: covered annually if you're between the age of 15-65  Also covered annually if you are younger than 13 and older than 72 with risk factors for HIV infection   For pregnant patients, it is covered up to 3 times per pregnancy  Immunizations:  Immunization Recommendations   Influenza Vaccine Annual influenza vaccination during flu season is recommended for all persons aged >= 6 months who do not have contraindications   Pneumococcal Vaccine (Prevnar and Pneumovax)  * Prevnar = PCV13  * Pneumovax = PPSV23   Adults 25-60 years old: 1-3 doses may be recommended based on certain risk factors  Adults 72 years old: Prevnar (PCV13) vaccine recommended followed by Pneumovax (PPSV23) vaccine  If already received PPSV23 since turning 65, then PCV13 recommended at least one year after PPSV23 dose  Hepatitis B Vaccine 3 dose series if at intermediate or high risk (ex: diabetes, end stage renal disease, liver disease)   Tetanus (Td) Vaccine - COST NOT COVERED BY MEDICARE PART B Following completion of primary series, a booster dose should be given every 10 years to maintain immunity against tetanus  Td may also be given as tetanus wound prophylaxis  Tdap Vaccine - COST NOT COVERED BY MEDICARE PART B Recommended at least once for all adults  For pregnant patients, recommended with each pregnancy  Shingles Vaccine (Shingrix) - COST NOT COVERED BY MEDICARE PART B  2 shot series recommended in those aged 48 and above     Health Maintenance Due:      Topic Date Due    Cervical Cancer Screening  Never done    Breast Cancer Screening: Mammogram  07/18/2020    Colorectal Cancer Screening  09/21/2020    HIV Screening  Completed    Hepatitis C Screening  Completed     Immunizations Due:      Topic Date Due    DTaP,Tdap,and Td Vaccines (1 - Tdap) Never done    Influenza Vaccine (1) 09/01/2021     Advance Directives   What are advance directives? Advance directives are legal documents that state your wishes and plans for medical care  These plans are made ahead of time in case you lose your ability to make decisions for yourself   Advance directives can apply to any medical decision, such as the treatments you want, and if you want to donate organs  What are the types of advance directives? There are many types of advance directives, and each state has rules about how to use them  You may choose a combination of any of the following:  · Living will: This is a written record of the treatment you want  You can also choose which treatments you do not want, which to limit, and which to stop at a certain time  This includes surgery, medicine, IV fluid, and tube feedings  · Durable power of  for healthcare [facility]): This is a written record that states who you want to make healthcare choices for you when you are unable to make them for yourself  This person, called a proxy, is usually a family member or a friend  You may choose more than 1 proxy  · Do not resuscitate (DNR) order:  A DNR order is used in case your heart stops beating or you stop breathing  It is a request not to have certain forms of treatment, such as CPR  A DNR order may be included in other types of advance directives  · Medical directive: This covers the care that you want if you are in a coma, near death, or unable to make decisions for yourself  You can list the treatments you want for each condition  Treatment may include pain medicine, surgery, blood transfusions, dialysis, IV or tube feedings, and a ventilator (breathing machine)  · Values history: This document has questions about your views, beliefs, and how you feel and think about life  This information can help others choose the care that you would choose  Why are advance directives important? An advance directive helps you control your care  Although spoken wishes may be used, it is better to have your wishes written down  Spoken wishes can be misunderstood, or not followed  Treatments may be given even if you do not want them  An advance directive may make it easier for your family to make difficult choices about your care     Weight Management   Why it is important to manage your weight:  Being overweight increases your risk of health conditions such as heart disease, high blood pressure, type 2 diabetes, and certain types of cancer  It can also increase your risk for osteoarthritis, sleep apnea, and other respiratory problems  Aim for a slow, steady weight loss  Even a small amount of weight loss can lower your risk of health problems  How to lose weight safely:  A safe and healthy way to lose weight is to eat fewer calories and get regular exercise  You can lose up about 1 pound a week by decreasing the number of calories you eat by 500 calories each day  Healthy meal plan for weight management:  A healthy meal plan includes a variety of foods, contains fewer calories, and helps you stay healthy  A healthy meal plan includes the following:  · Eat whole-grain foods more often  A healthy meal plan should contain fiber  Fiber is the part of grains, fruits, and vegetables that is not broken down by your body  Whole-grain foods are healthy and provide extra fiber in your diet  Some examples of whole-grain foods are whole-wheat breads and pastas, oatmeal, brown rice, and bulgur  · Eat a variety of vegetables every day  Include dark, leafy greens such as spinach, kale, lynda greens, and mustard greens  Eat yellow and orange vegetables such as carrots, sweet potatoes, and winter squash  · Eat a variety of fruits every day  Choose fresh or canned fruit (canned in its own juice or light syrup) instead of juice  Fruit juice has very little or no fiber  · Eat low-fat dairy foods  Drink fat-free (skim) milk or 1% milk  Eat fat-free yogurt and low-fat cottage cheese  Try low-fat cheeses such as mozzarella and other reduced-fat cheeses  · Choose meat and other protein foods that are low in fat  Choose beans or other legumes such as split peas or lentils  Choose fish, skinless poultry (chicken or turkey), or lean cuts of red meat (beef or pork)  Before you cook meat or poultry, cut off any visible fat  · Use less fat and oil  Try baking foods instead of frying them  Add less fat, such as margarine, sour cream, regular salad dressing and mayonnaise to foods  Eat fewer high-fat foods  Some examples of high-fat foods include french fries, doughnuts, ice cream, and cakes  · Eat fewer sweets  Limit foods and drinks that are high in sugar  This includes candy, cookies, regular soda, and sweetened drinks  Exercise:  Exercise at least 30 minutes per day on most days of the week  Some examples of exercise include walking, biking, dancing, and swimming  You can also fit in more physical activity by taking the stairs instead of the elevator or parking farther away from stores  Ask your healthcare provider about the best exercise plan for you  © Copyright [employer] 2018 Information is for End User's use only and may not be sold, redistributed or otherwise used for commercial purposes   All illustrations and images included in CareNotes® are the copyrighted property of [employer]  or [address] [place] [address]

## 2021-09-29 NOTE — PROGRESS NOTES
Assessment/Plan:    Benign hypertension    Recommend restarting diltiazem    Polyneuropathy associated with underlying disease ([facility] )   Recommend physical therapy to help with balance  Problem List Items Addressed This Visit        Cardiovascular and Mediastinum    Benign hypertension       Recommend restarting diltiazem         Relevant Medications    diltiazem (CARDIZEM CD) 240 mg 24 hr capsule    Essential hypertension - Primary    Relevant Medications    diltiazem (CARDIZEM CD) 240 mg 24 hr capsule       Nervous and Auditory    Polyneuropathy associated with underlying disease ([facility] )      Recommend physical therapy to help with balance  Other    Class 3 severe obesity with serious comorbidity and body mass index (BMI) of 50 0 to 59 9 in [facility])      Other Visit Diagnoses     Encounter for immunization        Relevant Orders    influenza vaccine, quadrivalent, recombinant, PF, 0 5 mL, for patients 18 yr+ (FLUBLOK) (Completed)    Need for shingles vaccine        Screen for colon cancer        Relevant Orders    Cologuard    Diabetic retinopathy associated with type 2 diabetes mellitus, macular edema presence unspecified, unspecified laterality, unspecified retinopathy severity ([facility] )        Relevant Orders    Ambulatory referral to Ophthalmology    Encounter for annual wellness visit (AWV) in Medicare patient                Subjective:      Patient ID: Hilary Kaba is a 56 y o  female  HPI  55 y/o F here for follow up of HTN, DM  She has not been taking diltiazem  She states she has not been getting it  BP has been running high    She is seeing endo for DM  She has continued to have vision problems  She is not UTD with eye exam       She has had balance problesm  She is using her cane  Pt was recommended and she plans to get started  She was also referred to sleep medicine but has not scheduled      The following portions of the patient's history were reviewed and updated as appropriate: She  has a past medical history of Anemia, Arthritis, Diabetes mellitus ([facility] ), Dyspnea on exertion, Hyperlipidemia, Hypertension, Legally blind (2010), Mild intermittent asthma with exacerbation (8/4/2018), Miscarriage, Seizures ([facility] ), Sickle cell trait ([facility] ), Sleep apnea, and Thyroid nodule (3/6/2020)    She   Patient Active Problem List    Diagnosis Date Noted    Ulnar neuropathy of right upper extremity     Blurry vision, bilateral 03/16/2021    Depressed mood 10/08/2020    Essential hypertension 09/15/2020    Diabetes mellitus ([facility] ) 09/08/2020    Chronic migraine without aura without status migrainosus, not intractable 04/22/2020    Hypersomnia 04/06/2020    Migraine without aura and without status migrainosus, not intractable 04/06/2020    Cerebral aneurysm without rupture 04/06/2020    History of absence seizures 03/25/2020    Thyroid nodule 03/06/2020    Aneurysm ([facility] ) 03/05/2020    Type 2 diabetes mellitus with hyperglycemia, with long-term current use of insulin ([facility] ) 02/21/2020    Left cavernous carotid aneurysm 02/10/2020    Polyneuropathy associated with underlying disease ([facility] ) 01/07/2020    PMR (polymyalgia rheumatica) ([facility] ) 01/07/2020    Thrombocytosis ([facility] ) 01/07/2020    Morbid obesity ([facility] ) 09/19/2019    Other inflammatory and immune myopathies, not elsewhere classified 09/16/2019    Transaminitis 09/16/2019    Frequent headaches 07/09/2019    Type 2 diabetes mellitus with microalbuminuria, with long-term current use of insulin ([facility] ) 02/01/2019    Mild intermittent asthma with exacerbation 08/04/2018    Orthostatic hypotension 06/25/2018    Lung nodules 03/22/2018    Exertional dyspnea 02/13/2018    Enlarged pulmonary artery ([facility] ) 02/13/2018    Aortic dilatation ([facility] ) 02/13/2018    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, with long-term current use of insulin ([facility])     Benign hypertension     Seizures ([facility] )     Obstructive sleep apnea 12/18/2017    Prolonged QT interval 12/18/2017    Decreased pulses in feet 12/11/2017    Diabetes mellitus type 2 with complications, uncontrolled ([facility] ) 09/08/2015    Microalbuminuria 09/08/2015    Vitamin D deficiency 06/06/2014    Visual impairment in both eyes 12/06/2012    Anemia 03/20/2012    Hyperlipidemia 03/20/2012    Class 3 severe obesity with serious comorbidity and body mass index (BMI) of 50 0 to 59 9 in adult [facility]) 03/20/2012    Neuropathy of hand, right 03/20/2012     She  has a past surgical history that includes Cataract extraction (Bilateral); Eye surgery; Hysterectomy; Oophorectomy (Left); pr temporal artery ligatn or bx (Bilateral, 10/17/2019); and US guided thyroid biopsy (5/13/2020)  Her family history includes Diabetes in her other; Heart attack in her maternal grandmother, mother, and other; Heart disease in her maternal grandmother, mother, and sister; Hypertension in her mother; No Known Problems in her brother, daughter, father, paternal aunt, sister, sister, son, and son  She  reports that she has never smoked  She has never used smokeless tobacco  She reports that she does not drink alcohol and does not use drugs    Current Outpatient Medications   Medication Sig Dispense Refill    albuterol (Proventil HFA) 90 mcg/act inhaler Inhale 2 puffs every 6 (six) hours as needed for wheezing For another 24 hours use every 4 hours standing 6 7 g 0    aspirin (ADULT ASPIRIN EC LOW STRENGTH) 81 mg EC tablet Take 81 mg by mouth daily       atorvastatin (LIPITOR) 40 mg tablet TAKE 1 TABLET BY MOUTH  DAILY 90 tablet 3    SARAH MICROLET LANCETS lancets Inject 1 applicator into the skin 4 (four) times a day      Blood Glucose Monitoring Suppl (Contour Next Monitor) w/Device KIT Disp 1 meter Dx; E11 9 1 kit 1    Blood Pressure Monitor KIT Check blood pressures BID 1 each 0    budesonide-formoterol (SYMBICORT) 80-4 5 MCG/ACT inhaler Inhale 2 puffs 2 (two) times a day Rinse mouth after use  1 Inhaler 5    Cholecalciferol (HM Vitamin D3) 100 MCG (4000 UT) CAPS 4000 units daily 60 capsule 5    Contour Next Test test strip Test 3x daily 300 each 3    Diclofenac Sodium POWD       diltiazem (CARDIZEM CD) 240 mg 24 hr capsule Take 2 capsules (480 mg total) by mouth daily 180 capsule 1    ferrous sulfate 324 (65 Fe) mg Take 1 tablet (324 mg total) by mouth every other day 45 tablet 3    gabapentin (NEURONTIN) 100 mg capsule Take 1 capsule in am and 2-3 capsules at night 90 capsule 3    hydrochlorothiazide (HYDRODIURIL) 25 mg tablet TAKE 1 TABLET BY MOUTH  DAILY 90 tablet 3    Insulin Regular Human, Conc, (HumuLIN R U-500 KwikPen) 500 units/mL CONCENTRATED U-500 injection pen INJECT SUBCUTANEOUSLY 110  UNITS BEFORE BREAKFAST AND  60 UNITS BEFORE DINNER 36 mL 3    losartan (COZAAR) 100 MG tablet TAKE 1 TABLET BY MOUTH  DAILY 90 tablet 3    magnesium oxide (MAG-OX) 400 mg tablet magnesium oxide 400 mg (241 3 mg magnesium) tablet   TAKE 1 TABLET BY MOUTH EVERY DAY      metFORMIN (GLUCOPHAGE-XR) 500 mg 24 hr tablet TAKE 2 TABLETS BY MOUTH  TWICE DAILY WITH MEALS 360 tablet 3    predniSONE 1 mg tablet Combine to take 9 mg once daily  Decrease by 1 mg every 4 weeks  360 tablet 1    predniSONE 5 mg tablet Combine to take 9 mg once daily  Decrease by 1 mg every 4 weeks  (Patient taking differently: Combine to take 9 mg once daily  Decrease by 1 mg every 4 weeks  Pt is now on 8mg  For this month ) 100 tablet 1    Riboflavin 400 MG TABS Take 1 tablet (400 mg total) by mouth daily 30 tablet 0    topiramate (TOPAMAX) 100 mg tablet TAKE 1 TABLET BY MOUTH AT  BEDTIME 90 tablet 3    urea 40 % LOTN lotion urea 40 % lotion   APPLY TO THE AFFECTED AREA(S) BY TOPICAL ROUTE 2 TIMES PER DAY      Insulin Pen Needle (BD Pen Needle Tita U/F) 32G X 4 MM MISC Use 2 (two) times a day 200 each 1     No current facility-administered medications for this visit       Current Outpatient Medications on File Prior to Visit Medication Sig    albuterol (Proventil HFA) 90 mcg/act inhaler Inhale 2 puffs every 6 (six) hours as needed for wheezing For another 24 hours use every 4 hours standing    aspirin (ADULT ASPIRIN EC LOW STRENGTH) 81 mg EC tablet Take 81 mg by mouth daily     atorvastatin (LIPITOR) 40 mg tablet TAKE 1 TABLET BY MOUTH  DAILY    SARAH MICROLET LANCETS lancets Inject 1 applicator into the skin 4 (four) times a day    Blood Glucose Monitoring Suppl (Contour Next Monitor) w/Device KIT Disp 1 meter Dx; E11 9    Blood Pressure Monitor KIT Check blood pressures BID    budesonide-formoterol (SYMBICORT) 80-4 5 MCG/ACT inhaler Inhale 2 puffs 2 (two) times a day Rinse mouth after use   Cholecalciferol (HM Vitamin D3) 100 MCG (4000 UT) CAPS 4000 units daily    Contour Next Test test strip Test 3x daily    Diclofenac Sodium POWD     ferrous sulfate 324 (65 Fe) mg Take 1 tablet (324 mg total) by mouth every other day    gabapentin (NEURONTIN) 100 mg capsule Take 1 capsule in am and 2-3 capsules at night    hydrochlorothiazide (HYDRODIURIL) 25 mg tablet TAKE 1 TABLET BY MOUTH  DAILY    Insulin Regular Human, Conc, (HumuLIN R U-500 KwikPen) 500 units/mL CONCENTRATED U-500 injection pen INJECT SUBCUTANEOUSLY 110  UNITS BEFORE BREAKFAST AND  60 UNITS BEFORE DINNER    losartan (COZAAR) 100 MG tablet TAKE 1 TABLET BY MOUTH  DAILY    magnesium oxide (MAG-OX) 400 mg tablet magnesium oxide 400 mg (241 3 mg magnesium) tablet   TAKE 1 TABLET BY MOUTH EVERY DAY    metFORMIN (GLUCOPHAGE-XR) 500 mg 24 hr tablet TAKE 2 TABLETS BY MOUTH  TWICE DAILY WITH MEALS    predniSONE 1 mg tablet Combine to take 9 mg once daily  Decrease by 1 mg every 4 weeks   predniSONE 5 mg tablet Combine to take 9 mg once daily  Decrease by 1 mg every 4 weeks  (Patient taking differently: Combine to take 9 mg once daily  Decrease by 1 mg every 4 weeks   Pt is now on 8mg  For this month )    Riboflavin 400 MG TABS Take 1 tablet (400 mg total) by mouth daily    topiramate (TOPAMAX) 100 mg tablet TAKE 1 TABLET BY MOUTH AT  BEDTIME    urea 40 % LOTN lotion urea 40 % lotion   APPLY TO THE AFFECTED AREA(S) BY TOPICAL ROUTE 2 TIMES PER DAY    Insulin Pen Needle (BD Pen Needle Tita U/F) 32G X 4 MM MISC Use 2 (two) times a day     No current facility-administered medications on file prior to visit  She has No Known Allergies       Review of Systems   Constitutional: Negative for activity change, appetite change, chills, fatigue and unexpected weight change  HENT: Negative for dental problem, ear pain, hearing loss and sore throat  Eyes: Negative for visual disturbance  Respiratory: Negative for cough and wheezing  Cardiovascular: Negative for chest pain  Gastrointestinal: Negative for abdominal pain, constipation, diarrhea and vomiting  Genitourinary: Negative for difficulty urinating and dysuria  Musculoskeletal: Positive for arthralgias  Negative for back pain and myalgias  Skin: Negative for rash  Neurological: Negative for dizziness and headaches  Psychiatric/Behavioral: Positive for sleep disturbance  Negative for behavioral problems  Objective:      /90 (BP Location: Left arm, Patient Position: Sitting, Cuff Size: Large)   Pulse (!) 111   Temp (!) 97 1 °F (36 2 °C) (Temporal)   Resp 20   Ht 5' 8" (1 727 m)   Wt (!) 150 kg (330 lb)   SpO2 96%   Breastfeeding No   BMI 50 18 kg/m²          Physical Exam  Vitals and nursing note reviewed  Constitutional:       Appearance: She is well-developed  HENT:      Head: Normocephalic and atraumatic  Right Ear: External ear normal       Left Ear: External ear normal       Nose: Nose normal    Eyes:      Conjunctiva/sclera: Conjunctivae normal    Neck:      Thyroid: No thyromegaly  Cardiovascular:      Rate and Rhythm: Normal rate and regular rhythm  Heart sounds: Normal heart sounds  No murmur heard       Pulmonary:      Effort: Pulmonary effort is normal  No respiratory distress  Breath sounds: Normal breath sounds  Musculoskeletal:         General: Normal range of motion  Cervical back: Normal range of motion and neck supple  Lymphadenopathy:      Cervical: No cervical adenopathy  Skin:     General: Skin is warm  Neurological:      Mental Status: She is alert and oriented to person, place, and time     Psychiatric:         Mood and Affect: Mood normal          Behavior: Behavior normal

## 2021-10-14 LAB — COLOGUARD RESULT REPORTABLE: NEGATIVE

## 2021-10-20 ENCOUNTER — OFFICE VISIT (OUTPATIENT)
Dept: ENDOCRINOLOGY | Facility: CLINIC | Age: 61
End: 2021-10-20
Payer: MEDICARE

## 2021-10-20 VITALS
SYSTOLIC BLOOD PRESSURE: 140 MMHG | HEART RATE: 103 BPM | WEIGHT: 293 LBS | DIASTOLIC BLOOD PRESSURE: 86 MMHG | BODY MASS INDEX: 44.41 KG/M2 | HEIGHT: 68 IN

## 2021-10-20 DIAGNOSIS — E66.01 CLASS 3 SEVERE OBESITY DUE TO EXCESS CALORIES WITH SERIOUS COMORBIDITY AND BODY MASS INDEX (BMI) OF 50.0 TO 59.9 IN ADULT (HCC): ICD-10-CM

## 2021-10-20 DIAGNOSIS — I28.8 ENLARGED PULMONARY ARTERY (HCC): ICD-10-CM

## 2021-10-20 DIAGNOSIS — Z79.4 TYPE 2 DIABETES MELLITUS WITH HYPERGLYCEMIA, WITH LONG-TERM CURRENT USE OF INSULIN (HCC): ICD-10-CM

## 2021-10-20 DIAGNOSIS — E11.29 TYPE 2 DIABETES MELLITUS WITH MICROALBUMINURIA, WITH LONG-TERM CURRENT USE OF INSULIN (HCC): Primary | ICD-10-CM

## 2021-10-20 DIAGNOSIS — R80.9 TYPE 2 DIABETES MELLITUS WITH MICROALBUMINURIA, WITH LONG-TERM CURRENT USE OF INSULIN (HCC): Primary | ICD-10-CM

## 2021-10-20 DIAGNOSIS — E04.1 THYROID NODULE: ICD-10-CM

## 2021-10-20 DIAGNOSIS — Z79.4 TYPE 2 DIABETES MELLITUS WITH MICROALBUMINURIA, WITH LONG-TERM CURRENT USE OF INSULIN (HCC): Primary | ICD-10-CM

## 2021-10-20 DIAGNOSIS — IMO0002 DIABETES MELLITUS TYPE 2 WITH COMPLICATIONS, UNCONTROLLED: ICD-10-CM

## 2021-10-20 DIAGNOSIS — E11.65 TYPE 2 DIABETES MELLITUS WITH HYPERGLYCEMIA, WITH LONG-TERM CURRENT USE OF INSULIN (HCC): ICD-10-CM

## 2021-10-20 PROCEDURE — 99214 OFFICE O/P EST MOD 30 MIN: CPT | Performed by: INTERNAL MEDICINE

## 2021-10-20 RX ORDER — BLOOD-GLUCOSE METER
EACH MISCELLANEOUS 3 TIMES DAILY
Qty: 1 KIT | Refills: 0 | Status: SHIPPED | OUTPATIENT
Start: 2021-10-20

## 2021-10-20 RX ORDER — BLOOD SUGAR DIAGNOSTIC
1 STRIP MISCELLANEOUS 3 TIMES DAILY
Qty: 300 EACH | Refills: 3 | Status: SHIPPED | OUTPATIENT
Start: 2021-10-20 | End: 2022-05-19

## 2021-10-20 RX ORDER — LANCETS 33 GAUGE
1 EACH MISCELLANEOUS 3 TIMES DAILY
Qty: 300 EACH | Refills: 3 | Status: SHIPPED | OUTPATIENT
Start: 2021-10-20

## 2021-10-20 RX ORDER — INSULIN HUMAN 500 [IU]/ML
INJECTION, SOLUTION SUBCUTANEOUS
Qty: 36 ML | Refills: 3 | Status: SHIPPED | OUTPATIENT
Start: 2021-10-20 | End: 2022-05-19

## 2021-10-21 ENCOUNTER — TELEPHONE (OUTPATIENT)
Dept: ENDOCRINOLOGY | Facility: CLINIC | Age: 61
End: 2021-10-21

## 2021-10-22 ENCOUNTER — PATIENT OUTREACH (OUTPATIENT)
Dept: FAMILY MEDICINE CLINIC | Facility: CLINIC | Age: 61
End: 2021-10-22

## 2021-11-01 ENCOUNTER — OFFICE VISIT (OUTPATIENT)
Dept: SLEEP CENTER | Facility: CLINIC | Age: 61
End: 2021-11-01
Payer: MEDICARE

## 2021-11-01 VITALS
DIASTOLIC BLOOD PRESSURE: 72 MMHG | SYSTOLIC BLOOD PRESSURE: 140 MMHG | WEIGHT: 293 LBS | BODY MASS INDEX: 44.41 KG/M2 | HEIGHT: 68 IN

## 2021-11-01 DIAGNOSIS — G47.33 OBSTRUCTIVE SLEEP APNEA: Primary | ICD-10-CM

## 2021-11-01 DIAGNOSIS — G47.10 HYPERSOMNIA: ICD-10-CM

## 2021-11-01 DIAGNOSIS — E66.01 CLASS 3 SEVERE OBESITY DUE TO EXCESS CALORIES WITH SERIOUS COMORBIDITY AND BODY MASS INDEX (BMI) OF 50.0 TO 59.9 IN ADULT (HCC): ICD-10-CM

## 2021-11-01 PROCEDURE — 99205 OFFICE O/P NEW HI 60 MIN: CPT | Performed by: NURSE PRACTITIONER

## 2021-11-02 ENCOUNTER — TELEPHONE (OUTPATIENT)
Dept: SLEEP CENTER | Facility: CLINIC | Age: 61
End: 2021-11-02

## 2021-11-02 ENCOUNTER — TELEPHONE (OUTPATIENT)
Dept: FAMILY MEDICINE CLINIC | Facility: CLINIC | Age: 61
End: 2021-11-02

## 2021-11-02 ENCOUNTER — EVALUATION (OUTPATIENT)
Dept: PHYSICAL THERAPY | Facility: CLINIC | Age: 61
End: 2021-11-02
Payer: MEDICARE

## 2021-11-02 DIAGNOSIS — R26.81 GAIT INSTABILITY: Primary | ICD-10-CM

## 2021-11-02 DIAGNOSIS — M35.3 POLYMYALGIA RHEUMATICA (HCC): ICD-10-CM

## 2021-11-02 DIAGNOSIS — H54.3 VISUAL IMPAIRMENT IN BOTH EYES: ICD-10-CM

## 2021-11-02 PROCEDURE — 97163 PT EVAL HIGH COMPLEX 45 MIN: CPT | Performed by: PHYSICAL THERAPIST

## 2021-11-02 PROCEDURE — 97110 THERAPEUTIC EXERCISES: CPT | Performed by: PHYSICAL THERAPIST

## 2021-11-08 ENCOUNTER — PATIENT OUTREACH (OUTPATIENT)
Dept: FAMILY MEDICINE CLINIC | Facility: CLINIC | Age: 61
End: 2021-11-08

## 2021-11-09 ENCOUNTER — OFFICE VISIT (OUTPATIENT)
Dept: PHYSICAL THERAPY | Facility: CLINIC | Age: 61
End: 2021-11-09
Payer: MEDICARE

## 2021-11-09 DIAGNOSIS — H54.3 VISUAL IMPAIRMENT IN BOTH EYES: Primary | ICD-10-CM

## 2021-11-09 DIAGNOSIS — M35.3 POLYMYALGIA RHEUMATICA (HCC): ICD-10-CM

## 2021-11-09 DIAGNOSIS — E11.42 DIABETIC POLYNEUROPATHY ASSOCIATED WITH TYPE 2 DIABETES MELLITUS (HCC): ICD-10-CM

## 2021-11-09 PROCEDURE — 97112 NEUROMUSCULAR REEDUCATION: CPT | Performed by: PHYSICAL THERAPIST

## 2021-11-09 PROCEDURE — 97110 THERAPEUTIC EXERCISES: CPT | Performed by: PHYSICAL THERAPIST

## 2021-11-09 RX ORDER — GABAPENTIN 100 MG/1
CAPSULE ORAL
Qty: 90 CAPSULE | Refills: 3 | Status: SHIPPED | OUTPATIENT
Start: 2021-11-09

## 2021-11-11 ENCOUNTER — OFFICE VISIT (OUTPATIENT)
Dept: PHYSICAL THERAPY | Facility: CLINIC | Age: 61
End: 2021-11-11
Payer: MEDICARE

## 2021-11-11 DIAGNOSIS — M35.3 POLYMYALGIA RHEUMATICA (HCC): ICD-10-CM

## 2021-11-11 DIAGNOSIS — H54.3 VISUAL IMPAIRMENT IN BOTH EYES: Primary | ICD-10-CM

## 2021-11-11 DIAGNOSIS — R26.81 GAIT INSTABILITY: ICD-10-CM

## 2021-11-11 PROCEDURE — 97110 THERAPEUTIC EXERCISES: CPT | Performed by: PHYSICAL THERAPIST

## 2021-11-11 PROCEDURE — 97112 NEUROMUSCULAR REEDUCATION: CPT | Performed by: PHYSICAL THERAPIST

## 2021-11-12 ENCOUNTER — OFFICE VISIT (OUTPATIENT)
Dept: PHYSICAL THERAPY | Facility: CLINIC | Age: 61
End: 2021-11-12
Payer: MEDICARE

## 2021-11-12 DIAGNOSIS — M35.3 POLYMYALGIA RHEUMATICA (HCC): ICD-10-CM

## 2021-11-12 DIAGNOSIS — H54.3 VISUAL IMPAIRMENT IN BOTH EYES: Primary | ICD-10-CM

## 2021-11-12 DIAGNOSIS — R26.81 GAIT INSTABILITY: ICD-10-CM

## 2021-11-12 PROCEDURE — 97110 THERAPEUTIC EXERCISES: CPT

## 2021-11-12 PROCEDURE — 97112 NEUROMUSCULAR REEDUCATION: CPT

## 2021-11-15 ENCOUNTER — APPOINTMENT (OUTPATIENT)
Dept: PHYSICAL THERAPY | Facility: CLINIC | Age: 61
End: 2021-11-15
Payer: MEDICARE

## 2021-11-15 ENCOUNTER — TELEPHONE (OUTPATIENT)
Dept: OBGYN CLINIC | Facility: HOSPITAL | Age: 61
End: 2021-11-15

## 2021-11-16 ENCOUNTER — APPOINTMENT (OUTPATIENT)
Dept: PHYSICAL THERAPY | Facility: CLINIC | Age: 61
End: 2021-11-16
Payer: MEDICARE

## 2021-11-16 ENCOUNTER — TELEPHONE (OUTPATIENT)
Dept: ENDOCRINOLOGY | Facility: CLINIC | Age: 61
End: 2021-11-16

## 2021-11-18 ENCOUNTER — APPOINTMENT (OUTPATIENT)
Dept: PHYSICAL THERAPY | Facility: CLINIC | Age: 61
End: 2021-11-18
Payer: MEDICARE

## 2021-11-22 ENCOUNTER — OFFICE VISIT (OUTPATIENT)
Dept: PHYSICAL THERAPY | Facility: CLINIC | Age: 61
End: 2021-11-22
Payer: MEDICARE

## 2021-11-22 DIAGNOSIS — M35.3 POLYMYALGIA RHEUMATICA (HCC): ICD-10-CM

## 2021-11-22 DIAGNOSIS — H54.3 VISUAL IMPAIRMENT IN BOTH EYES: Primary | ICD-10-CM

## 2021-11-22 DIAGNOSIS — R26.81 GAIT INSTABILITY: ICD-10-CM

## 2021-11-22 PROCEDURE — 97150 GROUP THERAPEUTIC PROCEDURES: CPT | Performed by: PHYSICAL THERAPIST

## 2021-11-22 PROCEDURE — 97112 NEUROMUSCULAR REEDUCATION: CPT | Performed by: PHYSICAL THERAPIST

## 2021-11-24 PROBLEM — R26.81 GAIT INSTABILITY: Status: ACTIVE | Noted: 2021-11-24

## 2021-11-24 PROBLEM — M35.3 POLYMYALGIA RHEUMATICA (HCC): Status: ACTIVE | Noted: 2021-11-24

## 2021-11-26 ENCOUNTER — TELEPHONE (OUTPATIENT)
Dept: FAMILY MEDICINE CLINIC | Facility: CLINIC | Age: 61
End: 2021-11-26

## 2021-11-26 ENCOUNTER — APPOINTMENT (OUTPATIENT)
Dept: PHYSICAL THERAPY | Facility: CLINIC | Age: 61
End: 2021-11-26
Payer: MEDICARE

## 2021-11-29 ENCOUNTER — OFFICE VISIT (OUTPATIENT)
Dept: PHYSICAL THERAPY | Facility: CLINIC | Age: 61
End: 2021-11-29
Payer: MEDICARE

## 2021-11-29 DIAGNOSIS — M35.3 POLYMYALGIA RHEUMATICA (HCC): ICD-10-CM

## 2021-11-29 DIAGNOSIS — H54.3 VISUAL IMPAIRMENT IN BOTH EYES: ICD-10-CM

## 2021-11-29 DIAGNOSIS — R26.81 GAIT INSTABILITY: Primary | ICD-10-CM

## 2021-11-29 PROCEDURE — 97110 THERAPEUTIC EXERCISES: CPT

## 2021-11-29 PROCEDURE — 97112 NEUROMUSCULAR REEDUCATION: CPT

## 2021-12-02 ENCOUNTER — OFFICE VISIT (OUTPATIENT)
Dept: PHYSICAL THERAPY | Facility: CLINIC | Age: 61
End: 2021-12-02
Payer: MEDICARE

## 2021-12-02 DIAGNOSIS — R26.81 GAIT INSTABILITY: Primary | ICD-10-CM

## 2021-12-02 DIAGNOSIS — M35.3 POLYMYALGIA RHEUMATICA (HCC): ICD-10-CM

## 2021-12-02 DIAGNOSIS — H54.3 VISUAL IMPAIRMENT IN BOTH EYES: ICD-10-CM

## 2021-12-02 PROCEDURE — 97110 THERAPEUTIC EXERCISES: CPT | Performed by: PHYSICAL THERAPIST

## 2021-12-02 PROCEDURE — 97530 THERAPEUTIC ACTIVITIES: CPT | Performed by: PHYSICAL THERAPIST

## 2021-12-02 PROCEDURE — 97112 NEUROMUSCULAR REEDUCATION: CPT | Performed by: PHYSICAL THERAPIST

## 2021-12-07 ENCOUNTER — PATIENT OUTREACH (OUTPATIENT)
Dept: FAMILY MEDICINE CLINIC | Facility: CLINIC | Age: 61
End: 2021-12-07

## 2022-01-04 ENCOUNTER — TELEPHONE (OUTPATIENT)
Dept: FAMILY MEDICINE CLINIC | Facility: CLINIC | Age: 62
End: 2022-01-04

## 2022-01-04 NOTE — TELEPHONE ENCOUNTER
THE [place] / Attending Physician Statement - Initial form received by FAX on 01/04/22  to be completed by PCP  Copy made and placed in PCP folder  Forms to be delivered to PCP mailbox at assigned time        CLAIM ID # [id number]

## 2022-01-07 ENCOUNTER — PATIENT OUTREACH (OUTPATIENT)
Dept: FAMILY MEDICINE CLINIC | Facility: CLINIC | Age: 62
End: 2022-01-07

## 2022-01-07 NOTE — PROGRESS NOTES
I called the patient but her phone is not accepting calls at this time  I will continue further outreach

## 2022-01-10 NOTE — TELEPHONE ENCOUNTER
She has apt on 1/28 and it is asking for corresponding visit to fill out form   I am going to keep it until then

## 2022-01-24 ENCOUNTER — PATIENT OUTREACH (OUTPATIENT)
Dept: FAMILY MEDICINE CLINIC | Facility: CLINIC | Age: 62
End: 2022-01-24

## 2022-01-24 NOTE — LETTER
Date: 01/24/22    Dear Valeria Ortiz,   My name is [employer]ree [employer]; I am a registered nurse care manager working with [facility]    I have not been able to reach you and would like to set a time that I can talk with you over the phone  My work is to help patients that have complex medical conditions get the care they need  This includes patients who may have been in the hospital or emergency room  Please call me with any questions you may have  I look forward to meeting with you      Sincerely,  [employer]  [phone]  Outpatient Care Manager

## 2022-01-24 NOTE — PROGRESS NOTES
I called the patient but her number is not in service  I am mailing the 'unable to reach' letter as I have been unsuccessful in reaching the patient  I await her response

## 2022-01-26 ENCOUNTER — OFFICE VISIT (OUTPATIENT)
Dept: ENDOCRINOLOGY | Facility: CLINIC | Age: 62
End: 2022-01-26
Payer: MEDICARE

## 2022-01-26 ENCOUNTER — TELEPHONE (OUTPATIENT)
Dept: ENDOCRINOLOGY | Facility: CLINIC | Age: 62
End: 2022-01-26

## 2022-01-26 VITALS
BODY MASS INDEX: 44.41 KG/M2 | HEIGHT: 68 IN | SYSTOLIC BLOOD PRESSURE: 150 MMHG | HEART RATE: 94 BPM | DIASTOLIC BLOOD PRESSURE: 86 MMHG | WEIGHT: 293 LBS

## 2022-01-26 DIAGNOSIS — I10 BENIGN HYPERTENSION: ICD-10-CM

## 2022-01-26 DIAGNOSIS — IMO0002 UNCONTROLLED TYPE 2 DIABETES MELLITUS WITH OPHTHALMIC COMPLICATION, WITH LONG-TERM CURRENT USE OF INSULIN: Primary | ICD-10-CM

## 2022-01-26 DIAGNOSIS — IMO0002 DIABETES MELLITUS TYPE 2 WITH COMPLICATIONS, UNCONTROLLED: ICD-10-CM

## 2022-01-26 DIAGNOSIS — E78.5 HYPERLIPIDEMIA, UNSPECIFIED HYPERLIPIDEMIA TYPE: ICD-10-CM

## 2022-01-26 LAB — SL AMB POCT HEMOGLOBIN AIC: 7.6 (ref ?–6.5)

## 2022-01-26 PROCEDURE — 83036 HEMOGLOBIN GLYCOSYLATED A1C: CPT | Performed by: PHYSICIAN ASSISTANT

## 2022-01-26 PROCEDURE — 99214 OFFICE O/P EST MOD 30 MIN: CPT | Performed by: PHYSICIAN ASSISTANT

## 2022-01-26 NOTE — PROGRESS NOTES
Established Patient Progress Note      Chief Complaint   Patient presents with    Diabetes Type 2        History of Present Illness:   Gumaro Johnson is a 59 y o  female with a history of type 2 diabetes with long term use of insulin since many years ago  Reports complications of retinopathy, neuropathy, and microalbuminuria  Denies recent illness or hospitalizations  Denies recent severe hypoglycemic or severe hyperglycemic episodes  Denies any issues with her current regimen  home glucose monitoring: are performed regularly  Just got a new meter a few weeks ago and has been checking 3x per day  Most readings below 150  No lows  Has lost about 12 pounds since last visit  She has been doing well with diet since she is no longer living with her   [facility] and this has been very helpful for her and recently saw a new PCP  Using pill pack to help with meds  30-day average glucose 160  Prednisone weaning, now 4mg daily and soon will be on 3mg  Current regimen:   Humulin R U500 Kwikpen 80 before breakfast and 30 before dinner  Metformin ER 500mg- 2 tabs twice per day     Last Eye Exam: appt next month  Last Foot Exam: Needs exam  Can not trim own nails  Hasn't seen podiatry since start of pandemic       Has hypertension: Taking Diltiazem, HCTZ, and losartan  Has hyperlipidemia: Taking atorvastatin    Thyroid disorders: hx thyroid nodules, stable on 6/2021 ultrasound  Hx prior FNA showed no malignancy    Patient Active Problem List   Diagnosis    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, with long-term current use of insulin (HCC)    Benign hypertension    Seizures (HCC)    Exertional dyspnea    Enlarged pulmonary artery (HCC)    Aortic dilatation (HCC)    Anemia    Decreased pulses in feet    Diabetes mellitus type 2 with complications, uncontrolled (HCC)    Hyperlipidemia    Microalbuminuria    Obstructive sleep apnea    Class 3 severe obesity with serious comorbidity and body mass index (BMI) of 50 0 to 59 9 in adult [facility])    Neuropathy of hand, right    Prolonged QT interval    Visual impairment in both eyes    Vitamin D deficiency    Lung nodules    Orthostatic hypotension    Mild intermittent asthma with exacerbation    Type 2 diabetes mellitus with microalbuminuria, with long-term current use of insulin (HCC)    Frequent headaches    Other inflammatory and immune myopathies, not elsewhere classified    Transaminitis    Morbid obesity [facility]    Polyneuropathy associated with underlying disease ([facility] )    PMR (polymyalgia rheumatica) ([address] )    Thrombocytosis    Left cavernous carotid aneurysm    Type 2 diabetes mellitus with hyperglycemia, with long-term current use of insulin (HCC)    Aneurysm (HCC)    Thyroid nodule    History of absence seizures    Hypersomnia    Migraine without aura and without status migrainosus, not intractable    Cerebral aneurysm without rupture    Chronic migraine without aura without status migrainosus, not intractable    Diabetes mellitus ([facility] )    Essential hypertension    Depressed mood    Blurry vision, bilateral    Ulnar neuropathy of right upper extremity    [facility]a ([facility])    Gait instability      Past Medical History:   Diagnosis Date    Anemia     Arthritis     Diabetes mellitus ([address] )     Dyspnea on exertion     Hyperlipidemia     Hypertension     Legally blind 2010    Mild intermittent asthma with exacerbation 8/4/2018    Miscarriage     x 3    Polymyalgia rheumatica ([facility] ) 11/24/2021    Seizures ([facility] )     Sickle cell trait ([facility] )     Sleep apnea     Thyroid nodule 3/6/2020      Past Surgical History:   Procedure Laterality Date    CATARACT EXTRACTION Bilateral     august and september    EYE SURGERY      HYSTERECTOMY      39    OOPHORECTOMY Left     39    AK TEMPORAL ARTERY LIGATN OR BX Bilateral 10/17/2019    Procedure: BIOPSY ARTERY TEMPORAL; Surgeon: Eryn Hawley DO;  Location: [facility];  Service: Vascular    US GUIDED THYROID BIOPSY  5/13/2020      Family History   Problem Relation Age of Onset    Heart attack Mother     Heart disease Mother     Hypertension Mother     No Known Problems Father     Heart disease Sister     No Known Problems Brother     No Known Problems Son     No Known Problems Daughter     Heart attack Maternal Grandmother     Heart disease Maternal Grandmother     Diabetes Other     Heart attack Other     No Known Problems Sister     No Known Problems Sister     No Known Problems Paternal Aunt     No Known Problems Son     Cancer Neg Hx     Stroke Neg Hx      Social History     Tobacco Use    Smoking status: Never Smoker    Smokeless tobacco: Never Used   Substance Use Topics    Alcohol use: Never     Alcohol/week: 0 0 standard drinks     No Known Allergies      Current Outpatient Medications:     albuterol (Proventil HFA) 90 mcg/act inhaler, Inhale 2 puffs every 6 (six) hours as needed for wheezing For another 24 hours use every 4 hours standing, Disp: 6 7 g, Rfl: 0    aspirin (ADULT ASPIRIN EC LOW STRENGTH) 81 mg EC tablet, Take 81 mg by mouth daily , Disp: , Rfl:     atorvastatin (LIPITOR) 40 mg tablet, TAKE 1 TABLET BY MOUTH  DAILY, Disp: 90 tablet, Rfl: 3    Blood Glucose Monitoring Suppl (OneTouch Verio) w/Device KIT, Use 3 (three) times a day, Disp: 1 kit, Rfl: 0    Blood Pressure Monitor KIT, Check blood pressures BID, Disp: 1 each, Rfl: 0    budesonide-formoterol (SYMBICORT) 80-4 5 MCG/ACT inhaler, Inhale 2 puffs 2 (two) times a day Rinse mouth after use , Disp: 1 Inhaler, Rfl: 5    Cholecalciferol (HM Vitamin D3) 100 MCG (4000 UT) CAPS, 4000 units daily, Disp: 60 capsule, Rfl: 5    diltiazem (CARDIZEM CD) 240 mg 24 hr capsule, Take 2 capsules (480 mg total) by mouth daily, Disp: 180 capsule, Rfl: 1    ferrous sulfate 324 (65 Fe) mg, Take 1 tablet (324 mg total) by mouth every other day, Disp: 45 tablet, Rfl: 3    gabapentin (NEURONTIN) 100 mg capsule, Take 1 capsule in am and 2-3 capsules at night, Disp: 90 capsule, Rfl: 3    glucose blood (OneTouch Verio) test strip, Use 1 each 3 (three) times a day, Disp: 300 each, Rfl: 3    hydrochlorothiazide (HYDRODIURIL) 25 mg tablet, TAKE 1 TABLET BY MOUTH  DAILY, Disp: 90 tablet, Rfl: 3    Insulin Pen Needle (BD Pen Needle Tita U/F) 32G X 4 MM MISC, Use 2 (two) times a day, Disp: 200 each, Rfl: 1    Insulin Regular Human, Conc, (HumuLIN R U-500 KwikPen) 500 units/mL CONCENTRATED U-500 injection pen, INJECT SUBCUTANEOUSLY 80   UNITS BEFORE BREAKFAST AND  30 UNITS BEFORE DINNER, Disp: 36 mL, Rfl: 3    Lancets (OneTouch Delica Plus LDWXKS27E) MISC, Use 1 each 3 (three) times a day, Disp: 300 each, Rfl: 3    losartan (COZAAR) 100 MG tablet, TAKE 1 TABLET BY MOUTH  DAILY, Disp: 90 tablet, Rfl: 3    magnesium oxide (MAG-OX) 400 mg tablet, magnesium oxide 400 mg (241 3 mg magnesium) tablet  TAKE 1 TABLET BY MOUTH EVERY DAY, Disp: , Rfl:     metFORMIN (GLUCOPHAGE-XR) 500 mg 24 hr tablet, TAKE 2 TABLETS BY MOUTH  TWICE DAILY WITH MEALS, Disp: 360 tablet, Rfl: 3    predniSONE 1 mg tablet, Combine to take 9 mg once daily  Decrease by 1 mg every 4 weeks  (Patient taking differently: Take 4 mg by mouth daily Combine to take 9 mg once daily  Decrease by 1 mg every 4 weeks  ), Disp: 360 tablet, Rfl: 1    Riboflavin 400 MG TABS, Take 1 tablet (400 mg total) by mouth daily, Disp: 30 tablet, Rfl: 0    topiramate (TOPAMAX) 100 mg tablet, TAKE 1 TABLET BY MOUTH AT  BEDTIME, Disp: 90 tablet, Rfl: 3    urea 40 % LOTN lotion, urea 40 % lotion  APPLY TO THE AFFECTED AREA(S) BY TOPICAL ROUTE 2 TIMES PER DAY, Disp: , Rfl:     Diclofenac Sodium POWD, , Disp: , Rfl:     predniSONE 5 mg tablet, Combine to take 9 mg once daily  Decrease by 1 mg every 4 weeks   (Patient not taking: Reported on 1/26/2022 ), Disp: 100 tablet, Rfl: 1    Review of Systems   Constitutional: Negative for activity change, appetite change, chills, diaphoresis, fatigue, fever and unexpected weight change  HENT: Negative for trouble swallowing and voice change  Eyes: Negative for visual disturbance  Respiratory: Negative for shortness of breath  Cardiovascular: Negative for chest pain and palpitations  Gastrointestinal: Negative for abdominal pain, constipation and diarrhea  Endocrine: Negative for cold intolerance, heat intolerance, polydipsia, polyphagia and polyuria  Genitourinary: Negative for frequency and menstrual problem  Musculoskeletal: Positive for gait problem  Negative for arthralgias and myalgias  Skin: Negative for rash  Allergic/Immunologic: Negative for food allergies  Neurological: Negative for dizziness and tremors  Hematological: Negative for adenopathy  Psychiatric/Behavioral: Negative for sleep disturbance  All other systems reviewed and are negative  Physical Exam:  Body mass index is 48 5 kg/m²  /86 (BP Location: Left arm, Patient Position: Sitting)   Pulse 94   Ht 5' 8" (1 727 m)   Wt (!) 145 kg (319 lb)   BMI 48 50 kg/m²    Wt Readings from Last 3 Encounters:   01/26/22 (!) 145 kg (319 lb)   11/01/21 (!) 150 kg (331 lb)   10/20/21 (!) 150 kg (331 lb 9 6 oz)       Physical Exam  Vitals reviewed  Constitutional:       General: She is not in acute distress  Appearance: She is well-developed  HENT:      Head: Normocephalic and atraumatic  Eyes:      Conjunctiva/sclera: Conjunctivae normal       Pupils: Pupils are equal, round, and reactive to light  Neck:      Thyroid: No thyromegaly  Cardiovascular:      Rate and Rhythm: Normal rate and regular rhythm  Pulses: no weak pulses          Dorsalis pedis pulses are 2+ on the right side and 2+ on the left side  Posterior tibial pulses are 2+ on the right side and 2+ on the left side  Heart sounds: Normal heart sounds     Pulmonary:      Effort: Pulmonary effort is normal  No respiratory distress  Breath sounds: Normal breath sounds  No wheezing or rales  Abdominal:      General: Bowel sounds are normal  There is no distension  Palpations: Abdomen is soft  Tenderness: There is no abdominal tenderness  Musculoskeletal:         General: Normal range of motion  Cervical back: Normal range of motion and neck supple  Right lower leg: Edema present  Left lower leg: Edema present  Feet:      Right foot:      Skin integrity: No ulcer, skin breakdown, erythema, warmth, callus or dry skin  Left foot:      Skin integrity: No ulcer, skin breakdown, erythema, warmth, callus or dry skin  Skin:     General: Skin is warm and dry  Neurological:      Mental Status: She is alert and oriented to person, place, and time  Patient's shoes and socks removed  Right Foot/Ankle   Right Foot Inspection  Skin Exam: skin normal and skin intact  No dry skin, no warmth, no callus, no erythema, no maceration, no abnormal color, no pre-ulcer, no ulcer and no callus  Toe Exam: ROM and strength within normal limits and right toe deformity  No swelling, no tenderness and erythema    Sensory   Vibration: diminished  Monofilament testing: intact    Vascular  Capillary refills: < 3 seconds  The right DP pulse is 2+  The right PT pulse is 2+  Left Foot/Ankle  Left Foot Inspection  Skin Exam: skin normal and skin intact  No dry skin, no warmth, no erythema, no maceration, normal color, no pre-ulcer, no ulcer and no callus  Toe Exam: ROM and strength within normal limits and left toe deformity (nails extremely long and thick)  No swelling, no tenderness and no erythema  Sensory   Vibration: absent  Monofilament testing: intact    Vascular  Capillary refills: < 3 seconds  The left DP pulse is 2+  The left PT pulse is 2+       Assign Risk Category  Deformity present  Loss of protective sensation  No weak pulses  Risk: 2      Labs:   Lab Results Component Value Date    HGBA1C 7 6 (A) 01/26/2022    HGBA1C 8 2 (H) 07/23/2021    HGBA1C 10 1 (H) 03/10/2021     Lab Results   Component Value Date    CREATININE 1 04 07/23/2021    CREATININE 1 04 05/07/2021    CREATININE 1 26 (H) 03/10/2021    BUN 23 07/23/2021     01/04/2016    K 3 5 (L) 07/23/2021     07/23/2021    CO2 32 (H) 07/23/2021     eGFR   Date Value Ref Range Status   07/23/2021 58 (L) >60 ml/min/1 73sq m Final     Lab Results   Component Value Date    CHOL 130 10/08/2015    HDL 45 05/07/2021    TRIG 115 05/07/2021     Lab Results   Component Value Date    ALT 18 05/07/2021    AST 18 05/07/2021    ALKPHOS 121 05/07/2021    BILITOT 0 69 01/04/2016     Lab Results   Component Value Date    GKN1PQUEFCYO 0 737 03/10/2021    TTT5ZTTEYDZD 1 820 05/04/2020    JII7OIWZMJYS 0 736 12/06/2019     Lab Results   Component Value Date    FREET4 0 88 03/10/2021       Impression & Plan:    Problem List Items Addressed This Visit        Endocrine    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, with long-term current use of insulin ([facility] ) - Primary    Relevant Orders    POCT hemoglobin A1c (Completed)    Ambulatory referral to Podiatry    Diabetes mellitus type 2 with complications, uncontrolled ([facility] [address] )     Diabetes control has improved significantly, but not at goal   Hypoglycemia has improved  Continue current regimen and check BG 3x per day and send log for review  If she develops hypoglycemia with continued reduction of prednisone she should let us know right away  Will request recent labs for review  Will request info for new PCP  She needs to see podiatry for routine diabetic foot care- referral placed  Lab Results   Component Value Date    HGBA1C 7 6 (A) 01/26/2022               Cardiovascular and Mediastinum    Benign hypertension     Not at goal today  Meds recently adjusted according to patient  Other    Hyperlipidemia     Continue atorvastatin  Orders Placed This Encounter   Procedures    Ambulatory referral to Podiatry     Standing Status:   Future     Standing Expiration Date:   1/26/2023     Referral Priority:   Routine     Referral Type:   Consult - AMB     Referral Reason:   Specialty Services Required     Referred to Provider:   Jackson Wayne DPM     Requested Specialty:   Podiatry     Number of Visits Requested:   1     Expiration Date:   1/26/2023    POCT hemoglobin A1c       There are no Patient Instructions on file for this visit  Discussed with the patient and all questioned fully answered  She will call me if any problems arise  Follow-up appointment in 3 months       Counseled patient on diagnostic results, prognosis, risk and benefit of treatment options, instruction for management, importance of treatment compliance, Risk  factor reduction and impressions    Marcella Antunez PA-C

## 2022-01-26 NOTE — ASSESSMENT & PLAN NOTE
Diabetes control has improved significantly, but not at goal   Hypoglycemia has improved  Continue current regimen and check BG 3x per day and send log for review  If she develops hypoglycemia with continued reduction of prednisone she should let us know right away  Will request recent labs for review     Will request info for new PCP  She needs to see podiatry for routine diabetic foot care- referral placed  Lab Results   Component Value Date    HGBA1C 7 6 (A) 01/26/2022

## 2022-01-27 ENCOUNTER — OFFICE VISIT (OUTPATIENT)
Dept: NEUROLOGY | Facility: CLINIC | Age: 62
End: 2022-01-27
Payer: MEDICARE

## 2022-01-27 VITALS
SYSTOLIC BLOOD PRESSURE: 169 MMHG | BODY MASS INDEX: 49.42 KG/M2 | WEIGHT: 293 LBS | DIASTOLIC BLOOD PRESSURE: 78 MMHG | TEMPERATURE: 97.7 F | HEART RATE: 92 BPM

## 2022-01-27 DIAGNOSIS — I67.1 CEREBRAL ANEURYSM WITHOUT RUPTURE: ICD-10-CM

## 2022-01-27 DIAGNOSIS — G43.709 CHRONIC MIGRAINE WITHOUT AURA WITHOUT STATUS MIGRAINOSUS, NOT INTRACTABLE: Primary | ICD-10-CM

## 2022-01-27 DIAGNOSIS — H54.3 VISUAL IMPAIRMENT IN BOTH EYES: ICD-10-CM

## 2022-01-27 DIAGNOSIS — R26.81 GAIT INSTABILITY: ICD-10-CM

## 2022-01-27 DIAGNOSIS — Z86.69 HISTORY OF ABSENCE SEIZURES: ICD-10-CM

## 2022-01-27 DIAGNOSIS — G47.33 OBSTRUCTIVE SLEEP APNEA: ICD-10-CM

## 2022-01-27 DIAGNOSIS — M35.3 POLYMYALGIA RHEUMATICA (HCC): ICD-10-CM

## 2022-01-27 PROCEDURE — 99214 OFFICE O/P EST MOD 30 MIN: CPT | Performed by: PHYSICIAN ASSISTANT

## 2022-01-27 NOTE — PROGRESS NOTES
Patient ID: Wes Olmstead is a 59 y o  female  Assessment/Plan:    No problem-specific Assessment & Plan notes found for this encounter  {Assess/PlanSmartLinks:64025}       Subjective:    HPI    {[facility] HPI texts:64569}    {Common ambulatory SmartLinks:59460}         Objective:    Blood pressure (!) 176/78, pulse 72, temperature 97 7 °F (36 5 °C), temperature source Temporal, weight (!) 147 kg (325 lb), not currently breastfeeding  Physical Exam    Neurological Exam      ROS:    Review of Systems   Constitutional: Negative  Negative for appetite change and fever  HENT: Negative  Negative for hearing loss, tinnitus, trouble swallowing and voice change  Eyes: Negative  Negative for photophobia and pain  Respiratory: Negative  Negative for shortness of breath  Cardiovascular: Negative  Negative for palpitations  Gastrointestinal: Negative  Negative for nausea and vomiting  Endocrine: Negative  Negative for cold intolerance  Genitourinary: Negative  Negative for dysuria, frequency and urgency  Musculoskeletal: Negative  Negative for myalgias and neck pain  Legs pain   Skin: Negative  Negative for rash  Neurological: Positive for headaches  Negative for dizziness, tremors, seizures, syncope, facial asymmetry, speech difficulty, weakness, light-headedness and numbness  Hematological: Negative  Does not bruise/bleed easily  Psychiatric/Behavioral: Negative  Negative for confusion, hallucinations and sleep disturbance

## 2022-01-27 NOTE — PROGRESS NOTES
Patient ID: Buster Christopher is a 59 y o  female  Assessment/Plan: For migraine headaches, which are more manageable at this time, continue Topamax 100 mg qhs  She is working with rheumatology for PMR, and decreasing prednisone to 3 mg next month  She is noticing more pain jonathan in the LLE, but she is determined to work through it in order to stay active and lose weight  F/u with Neurosurgery re: cerrebral aneurysms  Discussed importance of maintianing BP </=130/80 on average  -- Stable 3 to 4 mm left mid cavernous and cavernous cave ICA aneurysms  Eye appt- scheduled soon  Diagnoses and all orders for this visit:    Chronic migraine without aura without status migrainosus, not intractable    Polymyalgia rheumatica (HCC)    Gait instability    Visual impairment in both eyes    Obstructive sleep apnea    Cerebral aneurysm without rupture    History of absence seizures    Other orders  -     LAB RESULTS        The patient should not hesitate to call me prior to her follow up with any questions or concerns  Subjective:    HPI     Ms Gato Wilson is a pleasant 60 yo female with DM- legally blind since 2010 due to DM, seen in f/u for headaches and episodic dizziness and imbalance  States she has not been working since May 2019 due to severe headaches that render her nonfunctional, improved in frequency since adding topamax      For few weeks she was doing physical therapy for visual impairment and gait instability, felt that it was helpful  Still reporting some pain in the left lower extremity, stiffens up at times  She is following with Rheumatology and continues to decrease the dose of prednisone slowly, now from 5-4 mg daily this month, and then next month will be down to 3 mg  Continues Topamax daily for migraine prevention  Last ophthalmology note does not suggest glaucoma    She has pressure in the temples when she gets a headache, triggered by sleep deprivation or fatigue/not enough rest   On questioning she admits she is not using her CPAP as much now  Prior note: States location right temporal with secondary holocephalic headache, with bl conjunctival injection states unresolved since headaches started about a year ago with no clear inciting etiology  States at times sharp stabbing periorbital headaches with worse vision than normal  Feels like someone is hitting her in the head, photophobia, no phonophobia, no nausea or vomiting  Generalized weakness with PMR-- is seeing rheumatology on prednisone on 20 mg states felt better since lower dose her pain and pressure in shoulders has worsened- states upcoming appointment with rheumatology this coming Thursday  This has only mildly helped her headaches per her  Her ESR/CRP are improved compared to prior however remain elevated  Her temporal artery US and CT C/A/P were largely unremarkable per chart review     Her BP today is 201/99, states has her normal headache and dizziness and has bl conjunctival injection which she reports in not atypical with her headache  States Tylenol "knocks her out" thus takes this every night  Denies head or neck trauma, spine injury infection, recent illness fevers chills  Denies family hx cerebral aneurysm  Denies known personal history of autoimmune disease other than PMR diagnosed 9/2019 and long standing DM  EMG 3/22/21: Abnormal study  These electrodiagnostic findings are most consistent with a chronic ulnar neuropathy, localized best proximal to the innervation to flexor carpi ulnaris muscle  No focal slowing of conduction velocity was noted across the elbow, as seen in classic cubital tunnel syndrome  In addition, the reduced median sensory response could be suggestive of an underlying axonal neuropathy likely secondary to this patient's history of diabetes  Imaging of the right upper extremity is recommended to rule out any structural/inflammatory pathology to explain the ulnar neuropathy       The following portions of the patient's history were reviewed and updated as appropriate:   She  has a past medical history of Anemia, Arthritis, Diabetes mellitus ([facility] ), Dyspnea on exertion, Hyperlipidemia, Hypertension, Legally blind (2010), Mild intermittent asthma with exacerbation (8/4/2018), Miscarriage, Polymyalgia rheumatica ([facility] ) (11/24/2021), Seizures ([facility] [address] ), Sickle cell trait ([facility] ), Sleep apnea, and Thyroid nodule (3/6/2020)    She   Patient Active Problem List    Diagnosis Date Noted    Polymyalgia rheumatica ([facility] ) 11/24/2021    Gait instability 11/24/2021    Ulnar neuropathy of right upper extremity     Blurry vision, bilateral 03/16/2021    Depressed mood 10/08/2020    Essential hypertension 09/15/2020    Diabetes mellitus ([facility] ) 09/08/2020    Chronic migraine without aura without status migrainosus, not intractable 04/22/2020    Hypersomnia 04/06/2020    Migraine without aura and without status migrainosus, not intractable 04/06/2020    Cerebral aneurysm without rupture 04/06/2020    History of absence seizures 03/25/2020    Thyroid nodule 03/06/2020    Aneurysm ([facility] ) 03/05/2020    Type 2 diabetes mellitus with hyperglycemia, with long-term current use of insulin ([facility] ) 02/21/2020    Left cavernous carotid aneurysm 02/10/2020    Polyneuropathy associated with underlying disease ([facility] ) 01/07/2020    PMR (polymyalgia rheumatica) ([facility] ) 01/07/2020    Thrombocytosis 01/07/2020    Morbid obesity ([facility] ) 09/19/2019    Other inflammatory and immune myopathies, not elsewhere classified 09/16/2019    Transaminitis 09/16/2019    Frequent headaches 07/09/2019    Type 2 diabetes mellitus with microalbuminuria, with long-term current use of insulin ([facility] ) 02/01/2019    Mild intermittent asthma with exacerbation 08/04/2018    Orthostatic hypotension 06/25/2018    Lung nodules 03/22/2018    Exertional dyspnea 02/13/2018    Enlarged pulmonary artery ([facility] ) 02/13/2018    Aortic dilatation ([facility] ) 02/13/2018    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, with long-term current use of insulin     Benign hypertension     Seizures ([facility] )     Obstructive sleep apnea 12/18/2017    Prolonged QT interval 12/18/2017    Decreased pulses in feet 12/11/2017    Diabetes mellitus type 2 with complications, uncontrolled 09/08/2015    Microalbuminuria 09/08/2015    Vitamin D deficiency 06/06/2014    Visual impairment in both eyes 12/06/2012    Anemia 03/20/2012    Hyperlipidemia 03/20/2012    Class 3 severe obesity with serious comorbidity and body mass index (BMI) of 50 0 to 59 9 in adult [facility]) 03/20/2012    Neuropathy of hand, right 03/20/2012     She  has a past surgical history that includes Cataract extraction (Bilateral); Eye surgery; Hysterectomy; Oophorectomy (Left); pr temporal artery ligatn or bx (Bilateral, 10/17/2019); and US guided thyroid biopsy (5/13/2020)  Her family history includes Diabetes in her other; Heart attack in her maternal grandmother, mother, and other; Heart disease in her maternal grandmother, mother, and sister; Hypertension in her mother; No Known Problems in her brother, daughter, father, paternal aunt, sister, sister, son, and son  She  reports that she has never smoked  She has never used smokeless tobacco  She reports that she does not drink alcohol and does not use drugs    Current Outpatient Medications   Medication Sig Dispense Refill    albuterol (Proventil HFA) 90 mcg/act inhaler Inhale 2 puffs every 6 (six) hours as needed for wheezing For another 24 hours use every 4 hours standing 6 7 g 0    aspirin (ADULT ASPIRIN EC LOW STRENGTH) 81 mg EC tablet Take 81 mg by mouth daily       atorvastatin (LIPITOR) 40 mg tablet TAKE 1 TABLET BY MOUTH  DAILY 90 tablet 3    Blood Glucose Monitoring Suppl (OneTouch Verio) w/Device KIT Use 3 (three) times a day 1 kit 0    Blood Pressure Monitor KIT Check blood pressures BID 1 each 0    budesonide-formoterol (SYMBICORT) 80-4 5 MCG/ACT inhaler Inhale 2 puffs 2 (two) times a day Rinse mouth after use  1 Inhaler 5    Cholecalciferol (HM Vitamin D3) 100 MCG (4000 UT) CAPS 4000 units daily 60 capsule 5    Diclofenac Sodium POWD       diltiazem (CARDIZEM CD) 240 mg 24 hr capsule Take 2 capsules (480 mg total) by mouth daily 180 capsule 1    ferrous sulfate 324 (65 Fe) mg Take 1 tablet (324 mg total) by mouth every other day (Patient taking differently: Take 324 mg by mouth daily before breakfast  ) 45 tablet 3    gabapentin (NEURONTIN) 100 mg capsule Take 1 capsule in am and 2-3 capsules at night 90 capsule 3    glucose blood (OneTouch Verio) test strip Use 1 each 3 (three) times a day 300 each 3    hydrochlorothiazide (HYDRODIURIL) 25 mg tablet TAKE 1 TABLET BY MOUTH  DAILY 90 tablet 3    Insulin Pen Needle (BD Pen Needle Tita U/F) 32G X 4 MM MISC Use 2 (two) times a day 200 each 1    Insulin Regular Human, Conc, (HumuLIN R U-500 KwikPen) 500 units/mL CONCENTRATED U-500 injection pen INJECT SUBCUTANEOUSLY 80   UNITS BEFORE BREAKFAST AND  30 UNITS BEFORE DINNER 36 mL 3    Lancets (OneTouch Delica Plus UWZSBR28B) MISC Use 1 each 3 (three) times a day 300 each 3    losartan (COZAAR) 100 MG tablet TAKE 1 TABLET BY MOUTH  DAILY 90 tablet 3    magnesium oxide (MAG-OX) 400 mg tablet magnesium oxide 400 mg (241 3 mg magnesium) tablet   TAKE 1 TABLET BY MOUTH EVERY DAY      metFORMIN (GLUCOPHAGE-XR) 500 mg 24 hr tablet TAKE 2 TABLETS BY MOUTH  TWICE DAILY WITH MEALS 360 tablet 3    predniSONE 1 mg tablet Combine to take 9 mg once daily  Decrease by 1 mg every 4 weeks  (Patient taking differently: Take 4 mg by mouth daily Combine to take 9 mg once daily   Decrease by 1 mg every 4 weeks  ) 360 tablet 1    Riboflavin 400 MG TABS Take 1 tablet (400 mg total) by mouth daily 30 tablet 0    topiramate (TOPAMAX) 100 mg tablet TAKE 1 TABLET BY MOUTH AT  BEDTIME 90 tablet 3    urea 40 % LOTN lotion urea 40 % lotion   APPLY TO THE AFFECTED AREA(S) BY TOPICAL ROUTE 2 TIMES PER DAY       No current facility-administered medications for this visit  She has No Known Allergies            Objective:    Blood pressure 169/78, pulse 92, temperature 97 7 °F (36 5 °C), temperature source Temporal, weight (!) 147 kg (325 lb), not currently breastfeeding  Body mass index is 49 42 kg/m²  Physical Exam    Neurological Exam  On neurologic exam, the patient is alert and oriented to time and place  Speech is fluent and articulate, and the patient follows commands appropriately  Judgment and affect appear normal  Pupils are equally round and reactive to light and extraocular muscles are intact without nystagmus  Face is symmetric, and tongue, uvula, and palate are midline  Hearing is intact  Motor examination reveals intact strength throughout  Normal gait is wb, and she uses a cane  ROS:    Review of Systems   Constitutional: Negative  Negative for appetite change and fever  HENT: Negative  Negative for hearing loss, tinnitus, trouble swallowing and voice change  Eyes: Negative  Negative for photophobia and pain  Respiratory: Negative  Negative for shortness of breath  Cardiovascular: Negative  Negative for palpitations  Gastrointestinal: Negative  Negative for nausea and vomiting  Endocrine: Negative  Negative for cold intolerance  Genitourinary: Negative  Negative for dysuria, frequency and urgency  Musculoskeletal: Negative  Negative for myalgias and neck pain  Legs pain   Skin: Negative  Negative for rash  Neurological: Positive for headaches  Negative for dizziness, tremors, seizures, syncope, facial asymmetry, speech difficulty, weakness, light-headedness and numbness  Hematological: Negative  Does not bruise/bleed easily  Psychiatric/Behavioral: Negative  Negative for confusion, hallucinations and sleep disturbance       The following portions of the patient's history were reviewed and updated as appropriate: allergies, current medications/ medication history, past family history, past medical history, past social history, past surgical history and problem list     Review of systems was reviewed and otherwise unremarkable from a neurological perspective  I have spent 30 minutes with the patient today in which greater than 50% of this time was spent in counseling/coordination of care regarding diagnoses, test results, impression, plan and potential medication side effects

## 2022-02-07 ENCOUNTER — PATIENT OUTREACH (OUTPATIENT)
Dept: FAMILY MEDICINE CLINIC | Facility: CLINIC | Age: 62
End: 2022-02-07

## 2022-02-07 NOTE — PROGRESS NOTES
I returned the patient's call  She states she is doing well and spoke at length about the improvements in her life  She was happy to report she lost weight, she is down to 3mg of prednisone a day and plans on joining a gym in a few months  The patient states she is focusing on herself and trying to get healthier  She notes she asked her  to leave the apartment and her son has been helping her  Chart reviewed  I confirmed with the patient that she obtained a new PCP  She is now seen by [employer]  I informed her that her PCP at [facility] has left the practice which she was unaware  I reminded the patient of her Rheum appointment for tomorrow and her mammo appointment on 2/10  I congratulated the patient on her positive outlook, provided supportive listening and asked her to stay on track  Since the patient is no longer under the care at [facility], I am closing her case

## 2022-02-07 NOTE — PROGRESS NOTES
Assessment and Plan:   Ms Geri Welch a 69-year-old  female with history significant for polymyalgia rheumatica, insulin-dependent diabetes mellitus, with complications related to retinopathy causing legal blindness bilaterally, asthma, osteoarthritis, microcytic anemia of unclear etiology and morbid obesity, who presents for follow up of an episode involving bilateral arm pain/stiffness and swelling, associated with significantly elevated inflammatory markers with a CRP greater than 90 and an ESR of greater than 130, thought to be secondary to PMR  Sneha Momin is currently on prednisone 3 mg once daily       # Polymyalgia rheumatica  - Edie presents today for follow-up of significantly elevated inflammatory markers, as well as abrupt onset of symptoms related to bilateral upper extremity pain/stiffness/swelling, with mild symptoms of pain noted in her pelvic girdle region, for which she was initially evaluated in October 2019   Other than persistent headaches also experienced over the past 1 year, she does not complain of additional concerning symptoms on her review of systems   Her physical examination has also not been revealing, and further evaluation such as autoimmune serologies, a bilateral temporal artery ultrasound with temporal artery biopsies, and a CT of her chest/abdomen and pelvis have all been unrevealing  Jorge Cadena overall presentation was thought to be consistent with polymyalgia rheumatica   She states after few days of taking the initial steroids she noticed a significant improvement in her overall joint pains, swelling and stiffness, and has not had any major recurrences of the symptoms      - She is currently maintained on prednisone at 3 mg once daily and has overall done well on this dose  She has been decreasing by 1 mg every month and is doing very well  She would like to continue with the taper and see how she feels after she completely discontinues the steroids    Her inflammatory markers will be rechecked today  If she notices a recurrence of symptoms I advised her that we can always maintain her on prednisone at 5 mg once daily if required      # Chronic steroid use  - She is aware of the side effects associated with chronic steroid use including but not limited to, risk for infections, cataracts/glaucoma, hypertension, worsening diabetic control, gastritis, osteoporosis and avascular necrosis   I advised her to continue daily calcium and vitamin-D supplements   She did undergo a DEXA scan in January 2020 which was normal      # Neuropathy of her extremities  - This is likely secondary to diabetic neuropathy  Plan:  Diagnoses and all orders for this visit:    Polymyalgia rheumatica ([facility] )  -     C-reactive protein; Future  -     Sedimentation rate, automated; Future    Current chronic use of systemic steroids    Ulnar neuropathy at elbow, right    Insulin dependent type 2 diabetes mellitus (HCC)    Class 3 severe obesity without serious comorbidity with body mass index (BMI) of 50 0 to 59 9 in adult, unspecified obesity type ([facility] )      Activities as tolerated  Exercise: try to maintain a low impact exercise regimen as much as possible  Walk for 30 minutes a day for at least 3 days a week  Continue other medications as prescribed by PCP and other specialists  RTC in 6 months          HPI    INITIAL VISIT NOTE:  Ms Michelle Wheeler a 49-year-old  female with history significant for insulin-dependent diabetes mellitus, with complications related to retinopathy causing legal blindness bilaterally, asthma, osteoarthritis, microcytic anemia of unclear etiology and morbid obesity, who presents for further evaluation of an episode involving bilateral arm pain/stiffness and swelling, associated with significantly elevated inflammatory markers with a CRP greater than 90 and an ESR of greater than 130   She is referred by Dr Joyce Lauren a rheumatology consult      Patient reports in the first week of September 2019, she woke up one morning with sudden onset of pain and stiffness affecting her bilateral arms, to such an extent that she was unable to move them even slightly   This was also associated with a weakness sensation of her lower legs   As her symptoms did not improve within the week she was seen in the emergency room and admitted for further evaluation   Due to the visible swelling an x-ray of her hand and forearm were initially done which only showed dorsal soft tissue swelling   An upper extremity venous duplex ruled out evidence of a deep vein thrombosis   A CTA of her right upper extremity was essentially unremarkable except for edema noted in the subcutaneous fat of the distal forearm and hand although without a vascular etiology   The aortic arch and distal arteries were unremarkable      Further lab workup showed a leukocytosis of 14, with a chronic microcytic anemia with stable hemoglobin of 9, as well as thrombocytosis with platelets ranging from 500-700   Mild transaminitis was seen which eventually resolved, although she continued to have an elevated alkaline phosphatase which is still persistent   An ESR was found to be elevated at greater than 130, with a C reactive protein of greater than 90   An JUAN screen was positive at 1:80 homogeneous pattern   A hepatitis C antibody was found to be equivocal   An anti cardiolipin IgM antibody was mildly elevated at 13   TSH, blood cultures, uric acid, urinalysis, CK, rheumatoid factor, anti CCP antibody, anti neutrophilic cytoplasmic antibody, lupus anticoagulant, Lyme disease PCR, Sjogren's antibodies, SPEP, total complement, beta 2 glycoprotein antibodies, paraneoplastic profile, anti double-stranded DNA antibody, LDH, beta 2 microglobulin, immunoglobulin assay, and direct Gerri test were unremarkable      She reports during the hospital stay she declined the use of steroids due to the insulin-dependent diabetes, or pain medication use   Her symptoms were managed with Tylenol as needed, as well as therapy   She was also evaluated by vascular surgery during her hospital stay, with the workup being unrevealing   She was discharged home and advised to follow-up with Rheumatology for further evaluation  Sherri Greenfield was a concern for temporal arteritis versus polymyalgia rheumatica given the abrupt onset of her symptoms as well as the elevated inflammatory markers   She reports the majority of her symptoms have since resolved, and lasted for an entire duration of approximately 2 weeks   She did have follow-up labs done on September 30th which showed a persistently elevated ESR of 121, but with the down trending CRP of 23 2      Patient reports since December 2018 she has been experiencing new onset headaches which were diagnosed as migraine headaches by Neurology  Kellen Lucero states that the headache is sometimes felt at the back of her head or over her entire head, but is not usually limited to one side   She was recently seen in the emergency room for the headaches as well and no acute findings were noted on an MRI brain are MRA head   The MRI of her brain did show cerebral atrophy more than expected for the patient's age  Sherri Greenfield were also changes that could be consistent with mild chronic white matter microangiopathy versus sequelae of chronic migraine disease   A CTA head and neck was also unremarkable      She reports a few months ago her weight was 336 lb, and today she is presenting with a weight of 304 lb  Kellen Lucero thinks this may be related to a decreased appetite   When her symptoms started at the beginning of September she did experience jaw pain on the right side which self-resolved   She does report a history of multiple miscarriages   She denies fevers, night sweats, scalp tenderness/pain, new vision changes (she does have a history of retinopathy related to insulin-dependent diabetes mellitus, and states she is legally blind in both eyes), current jaw claudication, hair loss, sicca symptoms, unexplained skin rashes, psoriasis, photosensitivity, mouth/nose ulcers, epistaxis, recurrent sinus disease, swollen glands, pleuritic chest pain, hemoptysis (she does have a chronic history of shortness of breath and cough related to asthma), abdominal pain, vomiting, diarrhea, blood in stools, blood clots, Raynaud's, focal weakness or family history of autoimmune disease   Her sister may have some type of arthritis      She is up-to-date with a mammogram, and states Pap smears were discontinued as she has had a hysterectomy  Anthony Maharaj has never had a colonoscopy         11/6/2019:  Patient presents for a follow-up today  Sue Landry reviewed the ultrasound done of her bilateral temporal arteries which was unrevealing   A bilateral temporal artery biopsy did not show any inflammatory changes   Her ESR and CRP were still elevated at 95 and 20 7, respectively   Her hemoglobin was stable at 9 3   Her anti smooth muscle antibody was mildly elevated at 25   Her C3 and C4 were also mildly elevated   The remainder of the CBC, CMP (except for the alkaline phosphatase), SPEP, immunofixation, CK, aldolase, HCV RNA, HIV, ferritin, anti mitochondrial antibody, urinalysis and urine protein creatinine ratio were unremarkable      She reports since the last office visit she has had approximately 4 flare-ups of pain and stiffness affecting her predominantly from the bilateral elbows up to her shoulders   She states that this has been occurring on a weekly basis, but it does take days to resolve and overlaps with the next flare she has  Anthony Maharaj recalls in the past she has also experienced pain in her bilateral hip region, but states currently her predominant symptoms have been occurring from the elbows upwards   Only on occasion will she experience a discomfort from her wrists radiating into her hands   She denies any new complaints of joint pains or swelling      Since the last office visit she has also been experiencing increasing redness of her bilateral eyes   She did see her ophthalmologist a few weeks ago and apparently there was no inflammation detected  Chelsea Lowe is scheduled for a follow-up on November 20th      She has also been having worsening headaches which she feels in a bandlike fashion across her head  Chelsea Lowe has never been evaluated by Neurology         12/4/2019:  Patient presents for a follow-up of polymyalgia rheumatica  Chelsea Lowe is currently on oral prednisone 20 mg once daily   She did not have a chance to do her labs following the prior office visit      She reports a few days after starting the prednisone at 20 mg once daily she started to notice a significant improvement in her joint pains and swelling   She states it has also temporarily been helping with the headaches as well as the vision changes and redness of her eyes   As far she is aware she has never been told of an inflammatory eye disorder by her ophthalmologist  Chelsea Lowe reports with activities such as vacuuming and cleaning the house this has also been helping with her joint pain, but the improvement has all been amplified after starting the prednisone   Symptomatically she has been tolerating the steroids well, but on a few occasions has noticed blood sugar readings in the 400s to 500s   She has not contacted her endocrinologist with this information, and they are not aware that she has been started on steroids   She overall denies any joint pains, swelling or stiffness, but states on occasion with changes in the weather and the cooler temperatures she may feel some achiness      She continues to experience the headaches, and is scheduled for a neurology evaluation in February 2020   She was recently seen by Hematology and her lab abnormalities were all thought to be related to a reactive process   No other complaints noted at this time         1/8/2020:  Patient presents for a follow-up of polymyalgia rheumatica  Chelsea Lowe is currently on oral prednisone 15 mg once daily   I reviewed her labs done yesterday which showed an ESR of 59 which had improved, but her C reactive protein continues to be elevated at 38  5      At the last office visit we had decreased her prednisone dose from 20-15 mg once daily   She mentions that there has been no significant change in her symptoms with doing so and she is tolerating the steroid taper well   Dependent on the weather she may have some achiness around her shoulder region, but otherwise denies any significant flare-ups of joint pain, swelling or stiffness   She is tolerating the prednisone well as well   She continues to check her blood sugars 3-4 times daily, and is in touch with her endocrinologist regarding the hyperglycemia   They did increase her insulin regimen      She will be following up with her ophthalmologist at the end of January   She continues to experience the eye redness and blurred vision  Greg Ferreira has an appointment with Neurology next month as she continues to experience the headaches         2/19/2020:  Patient presents for follow-up of polymyalgia rheumatica  Greg Ferreira is currently on prednisone 10 mg once daily      At the last office visit she was on 15 mg once daily, and we had discussed tapering by 2 5 mg every 2 weeks   She states at a dose of 12 5 mg once daily she started to notice a slight recurrence of pain mostly affecting her shoulders and right wrist, but this change worsened even further when she decreased to 10 mg once daily   She has been on this dose for approximately 2 weeks now  Greg Ferreira states that she is still able to exercise and work through the pain in her shoulders, but it has also affected her right wrist and very occasionally in her bilateral groin region   She has noticed mild swelling of her right shoulder without any other swollen joints   She is not really experiencing morning stiffness      She has had issues with significantly high blood sugar readings occasionally greater than 600   She is working closely with endocrinology to adjust her insulin regimen   In view of this she is very hesitant to increase her dose of the steroids again      She was recently seen by Neurology in view of the chronic headaches and this is thought to possibly be related to uncontrolled hypertension         4/1/2020:  Patient presents for a follow-up of polymyalgia rheumatica  Greg Ferreira is currently on prednisone 8 mg once daily   I reviewed her ESR and CRP done following the last office visit which were down trending at 51 and 20 9, respectively      Patient reports following our last office visit in mid February she decreased her prednisone dose to 9 mg once daily and had been on that for the past 1 month   She further decreased her dose to 8 mg once daily today   She mentions while decreasing her prednisone dose from 10-9 mg once daily, she noticed a slight flare-up of pain and swelling affecting her right wrist, as well as intermittent pain affecting her right shoulder   She will also notice occasional pain affecting her bilateral knees, but this could be related to osteoarthritis   No other significant joint pains, swelling or stiffness noted with the steroid decrease   She denies any new headaches, vision changes or jaw claudication   She is continuing follow-up with neurology for the headaches she has been experiencing  Greg Ferreira is also scheduled for an EEG next month      She has been careful with monitoring her blood pressures as well as blood sugars, and states that with adjustments to her insulin regimen her blood sugars are mostly under 200 at this time         9/23/2020:  Patient presents for a follow-up of polymyalgia rheumatica  Greg Ferreira is currently on prednisone 6 mg once daily   She has not had any recent labs done      Since the last office visit she has been tapering by 1 mg every 2-3 months   She has been on the 6 mg once daily since August   She has not had any issues with the steroid taper   She states at one point of time she was having a flare-up of pain affecting her right wrist but this has resolved and she is currently denying any significant issues related to joint pains   She does feel like she has slight swelling and puffiness in her bilateral forearm region  Piper Hannon is not really experiencing any morning stiffness   She has noticed a numbness sensation of her right hand 5th digit which is bothersome to her      No other complaints noted today   She has been monitoring her blood sugars and follows with endocrinology for management of the insulin         4/28/2021:  Patient presents for a follow-up of polymyalgia rheumatica  Piper Hannon is currently on prednisone 10 mg once daily   The labs checked in September 2020 showed an elevated ESR and CRP of 95 and 28 4, respectively   She has not been seen for a follow-up since then   I also reviewed the EMG/NCS study of her right upper extremity which showed ulnar neuropathy      At the last office visit she was on prednisone at 6 mg once daily and was tapering by 1 mg every 1-2 months   She reports when she was down to a dose of 5 mg once daily her overall body pain and stiffness significantly worsened requiring her to use a walker   In view of this she increase the prednisone back to 10 mg once daily and has been on this dose consistently for the past few months   This significantly helped with improving her symptoms   Currently she mostly describes a sensation of stiffness in her hands as well as the numbness in her bilateral 5th digits   No significant joint pains   She describes an achiness throughout her legs as well as numbness in her feet and occasional leg swelling   She has otherwise been tolerating the prednisone well without any concerns for side effects or infections        She is following with endocrinology for management of the diabetes and states that this has been much better controlled with changing her insulin regimen      Overall she has been feeling quite well and has started volunteering programs         7/28/2021:  Patient presents for a follow-up of polymyalgia rheumatica  Levon Altamirano is currently on prednisone 10 mg once daily   The labs checked in May 2021 showed an elevated ESR and CRP of 64 and 20 2, respectively      She reports since the last office visit she did not start the steroid taper as she was unable to get refills on the prednisone due to financial reasons  She has maintained on prednisone at 10 mg once daily and overall states she has been doing well  She has also been focused on a healthy lifestyle with changing her diet and initiating an exercise regimen which is overall contributing to her well-being  Her blood sugars have been better controlled and her A1c has reduced  2/8/2022:  Patient presents for a follow-up of polymyalgia rheumatica  Levon Altamirano is currently on prednisone 3 mg once daily   The labs checked in July 2021 showed an elevated ESR and CRP of 71 and 29 2, respectively  She reports overall she is doing very well at this time and has been able to decrease the prednisone by 1 mg every month to her current dose of 3 mg once daily  She states a few weeks ago when she made the change to 5 mg once daily she noticed significant stiffness, pain and swelling especially in her left leg but also in her hands and arms  She has managed to work through these symptoms on her own with anti-inflammatory supplements such as turmeric, aubrey and pepper and has also started an exercise regimen at the gym  She feels like these conservative measures significantly helped her and she is doing great at this time      The following portions of the patient's history were reviewed and updated as appropriate: allergies, current medications, past family history, past medical history, past social history, past surgical history and problem list       Review of Systems  Constitutional: Negative for weight change, fevers, chills, night sweats, fatigue  ENT/Mouth: Negative for hearing changes, ear pain, nasal congestion, sinus pain, hoarseness, sore throat, rhinorrhea, swallowing difficulty  Eyes: Negative for pain, redness, discharge, vision changes  Cardiovascular: Negative for chest pain, SOB, palpitations  Respiratory: Negative for cough, sputum, wheezing, dyspnea  Gastrointestinal: Negative for nausea, vomiting, diarrhea, constipation, pain, heartburn  Genitourinary: Negative for dysuria, urinary frequency, hematuria  Musculoskeletal: As per HPI  Skin: Negative for skin rash, color changes  Neuro: Negative for weakness, numbness, tingling, loss of consciousness  Psych: Negative for anxiety, depression  Heme/Lymph: Negative for easy bruising, bleeding, lymphadenopathy          Past Medical History:   Diagnosis Date    Anemia     Arthritis     Diabetes mellitus [facility]     Dyspnea on exertion     Hyperlipidemia     Hypertension     Legally blind 2010    Mild intermittent asthma with exacerbation 8/4/2018    Miscarriage     x 3    Polymyalgia rheumatica (HCC) 11/24/2021    Seizures (HCC)     Sickle cell trait ([facility] )     Sleep apnea     Thyroid nodule 3/6/2020       Past Surgical History:   Procedure Laterality Date    CATARACT EXTRACTION Bilateral     august and september    EYE SURGERY      HYSTERECTOMY      37    OOPHORECTOMY Left     37    WA TEMPORAL ARTERY LIGATN OR BX Bilateral 10/17/2019    Procedure: BIOPSY ARTERY TEMPORAL;  Surgeon: Rodney Bhat DO;  Location: [facility];  Service: Vascular    US GUIDED THYROID BIOPSY  5/13/2020       Social History     Socioeconomic History    Marital status: /Civil Union     Spouse name: Not on file    Number of children: Not on file    Years of education: Not on file    Highest education level: Not on file   Occupational History    Occupation: long term disability   Tobacco Use    Smoking status: Never Smoker    Smokeless tobacco: Never Used   Vaping Use    Vaping Use: Never used   Substance and Sexual Activity    Alcohol use: Never     Alcohol/week: 0 0 standard drinks    Drug use: No    Sexual activity: Not Currently     Partners: Male     Birth control/protection: None   Other Topics Concern    Not on file   Social History Narrative    Caffeine use    Resides with spouse and one son     Social Determinants of Health     Financial Resource Strain: Not on file   Food Insecurity: Not on file   Transportation Needs: Not on file   Physical Activity: Not on file   Stress: Not on file   Social Connections: Not on file   Intimate Partner Violence: Not on file   Housing Stability: Not on file       Family History   Problem Relation Age of Onset    Heart attack Mother     Heart disease Mother     Hypertension Mother     No Known Problems Father     Heart disease Sister     No Known Problems Brother     No Known Problems Son     No Known Problems Daughter     Heart attack Maternal Grandmother     Heart disease Maternal Grandmother     Diabetes Other     Heart attack Other     No Known Problems Sister     No Known Problems Sister     No Known Problems Paternal Aunt     No Known Problems Son     Cancer Neg Hx     Stroke Neg Hx        No Known Allergies      Current Outpatient Medications:     albuterol (Proventil HFA) 90 mcg/act inhaler, Inhale 2 puffs every 6 (six) hours as needed for wheezing For another 24 hours use every 4 hours standing, Disp: 6 7 g, Rfl: 0    aspirin (ADULT ASPIRIN EC LOW STRENGTH) 81 mg EC tablet, Take 81 mg by mouth daily , Disp: , Rfl:     atorvastatin (LIPITOR) 40 mg tablet, TAKE 1 TABLET BY MOUTH  DAILY, Disp: 90 tablet, Rfl: 3    Blood Glucose Monitoring Suppl (OneTouch Verio) w/Device KIT, Use 3 (three) times a day, Disp: 1 kit, Rfl: 0    Blood Pressure Monitor KIT, Check blood pressures BID, Disp: 1 each, Rfl: 0    budesonide-formoterol (SYMBICORT) 80-4 5 MCG/ACT inhaler, Inhale 2 puffs 2 (two) times a day Rinse mouth after use , Disp: 1 Inhaler, Rfl: 5    Cholecalciferol (HM Vitamin D3) 100 MCG (4000 UT) CAPS, 4000 units daily, Disp: 60 capsule, Rfl: 5    Diclofenac Sodium POWD, , Disp: , Rfl:     diltiazem (CARDIZEM CD) 240 mg 24 hr capsule, Take 2 capsules (480 mg total) by mouth daily, Disp: 180 capsule, Rfl: 1    ferrous sulfate 324 (65 Fe) mg, Take 1 tablet (324 mg total) by mouth every other day, Disp: 45 tablet, Rfl: 3    gabapentin (NEURONTIN) 100 mg capsule, Take 1 capsule in am and 2-3 capsules at night, Disp: 90 capsule, Rfl: 3    glucose blood (OneTouch Verio) test strip, Use 1 each 3 (three) times a day, Disp: 300 each, Rfl: 3    hydrochlorothiazide (HYDRODIURIL) 25 mg tablet, TAKE 1 TABLET BY MOUTH  DAILY, Disp: 90 tablet, Rfl: 3    Insulin Pen Needle (BD Pen Needle Tita U/F) 32G X 4 MM MISC, Use 2 (two) times a day, Disp: 200 each, Rfl: 1    Insulin Regular Human, Conc, (HumuLIN R U-500 KwikPen) 500 units/mL CONCENTRATED U-500 injection pen, INJECT SUBCUTANEOUSLY 80   UNITS BEFORE BREAKFAST AND  30 UNITS BEFORE DINNER, Disp: 36 mL, Rfl: 3    Lancets (OneTouch Delica Plus [id number]) MISC, Use 1 each 3 (three) times a day, Disp: 300 each, Rfl: 3    losartan (COZAAR) 100 MG tablet, TAKE 1 TABLET BY MOUTH  DAILY, Disp: 90 tablet, Rfl: 3    magnesium oxide (MAG-OX) 400 mg tablet, magnesium oxide 400 mg (241 3 mg magnesium) tablet  TAKE 1 TABLET BY MOUTH EVERY DAY, Disp: , Rfl:     metFORMIN (GLUCOPHAGE-XR) 500 mg 24 hr tablet, TAKE 2 TABLETS BY MOUTH  TWICE DAILY WITH MEALS, Disp: 360 tablet, Rfl: 3    predniSONE 1 mg tablet, Combine to take 9 mg once daily  Decrease by 1 mg every 4 weeks  (Patient taking differently: Take 4 mg by mouth daily Combine to take 9 mg once daily  Decrease by 1 mg every 4 weeks  ), Disp: 360 tablet, Rfl: 1    predniSONE 5 mg tablet, Combine to take 9 mg once daily  Decrease by 1 mg every 4 weeks  , Disp: 100 tablet, Rfl: 1    Riboflavin 400 MG TABS, Take 1 tablet (400 mg total) by mouth daily, Disp: 30 tablet, Rfl: 0    topiramate (TOPAMAX) 100 mg tablet, TAKE 1 TABLET BY MOUTH AT  BEDTIME, Disp: 90 tablet, Rfl: 3    urea 40 % LOTN lotion, urea 40 % lotion  APPLY TO THE AFFECTED AREA(S) BY TOPICAL ROUTE 2 TIMES PER DAY, Disp: , Rfl:       Objective:    Vitals:    02/08/22 1537   BP: 122/78   Pulse: 88       Physical Exam  General: Well appearing, well nourished, in no distress  Oriented x 3, normal mood and affect  Ambulating with aid of a cane  Skin: Good turgor, no rash, unusual bruising or prominent lesions  Hair: Normal texture and distribution  Nails: Normal color, no deformities  HEENT:  Head: Normocephalic, atraumatic  Eyes: Conjunctiva clear, sclera non-icteric, EOM intact  Extremities: No amputations or deformities, cyanosis, edema  Musculoskeletal:  No swelling visualized  Neurologic: Alert and oriented  No focal neurological deficits appreciated  Psychiatric: Normal mood and affect  DOC Vogel    Rheumatology

## 2022-02-08 ENCOUNTER — OFFICE VISIT (OUTPATIENT)
Dept: RHEUMATOLOGY | Facility: CLINIC | Age: 62
End: 2022-02-08
Payer: MEDICARE

## 2022-02-08 VITALS — SYSTOLIC BLOOD PRESSURE: 122 MMHG | DIASTOLIC BLOOD PRESSURE: 78 MMHG | HEART RATE: 88 BPM

## 2022-02-08 DIAGNOSIS — E11.9 INSULIN DEPENDENT TYPE 2 DIABETES MELLITUS (HCC): ICD-10-CM

## 2022-02-08 DIAGNOSIS — Z79.52 CURRENT CHRONIC USE OF SYSTEMIC STEROIDS: ICD-10-CM

## 2022-02-08 DIAGNOSIS — Z79.4 INSULIN DEPENDENT TYPE 2 DIABETES MELLITUS (HCC): ICD-10-CM

## 2022-02-08 DIAGNOSIS — E66.01 CLASS 3 SEVERE OBESITY WITHOUT SERIOUS COMORBIDITY WITH BODY MASS INDEX (BMI) OF 50.0 TO 59.9 IN ADULT, UNSPECIFIED OBESITY TYPE (HCC): ICD-10-CM

## 2022-02-08 DIAGNOSIS — M35.3 POLYMYALGIA RHEUMATICA (HCC): Primary | ICD-10-CM

## 2022-02-08 DIAGNOSIS — G56.21 ULNAR NEUROPATHY AT ELBOW, RIGHT: ICD-10-CM

## 2022-02-08 PROCEDURE — 99215 OFFICE O/P EST HI 40 MIN: CPT | Performed by: INTERNAL MEDICINE

## 2022-02-18 ENCOUNTER — HOSPITAL ENCOUNTER (OUTPATIENT)
Dept: CT IMAGING | Facility: HOSPITAL | Age: 62
Discharge: HOME | End: 2022-02-18
Payer: MEDICARE

## 2022-02-18 DIAGNOSIS — I77.819 AORTIC DILATATION (HCC): ICD-10-CM

## 2022-02-18 DIAGNOSIS — R91.8 LUNG NODULES: ICD-10-CM

## 2022-02-18 DIAGNOSIS — I28.8 ENLARGED PULMONARY ARTERY (HCC): ICD-10-CM

## 2022-02-18 PROCEDURE — 71260 CT THORAX DX C+: CPT

## 2022-02-18 RX ADMIN — IOHEXOL 85 ML: 350 INJECTION, SOLUTION INTRAVENOUS at 13:51

## 2022-03-07 ENCOUNTER — HOSPITAL ENCOUNTER (OUTPATIENT)
Dept: RADIOLOGY | Facility: HOSPITAL | Age: 62
Discharge: HOME | End: 2022-03-07
Payer: MEDICARE

## 2022-03-07 DIAGNOSIS — I67.1 LEFT CAVERNOUS CAROTID ANEURYSM: ICD-10-CM

## 2022-03-07 PROCEDURE — 70496 CT ANGIOGRAPHY HEAD: CPT

## 2022-03-07 PROCEDURE — G1004 CDSM NDSC: HCPCS

## 2022-03-07 RX ADMIN — IOHEXOL 100 ML: 350 INJECTION, SOLUTION INTRAVENOUS at 13:02

## 2022-03-15 ENCOUNTER — HOSPITAL ENCOUNTER (OUTPATIENT)
Dept: MAMMOGRAPHY | Facility: CLINIC | Age: 62
Discharge: HOME | End: 2022-03-15
Payer: MEDICARE

## 2022-03-15 ENCOUNTER — HOSPITAL ENCOUNTER (OUTPATIENT)
Dept: ULTRASOUND IMAGING | Facility: CLINIC | Age: 62
Discharge: HOME | End: 2022-03-15
Payer: MEDICARE

## 2022-03-15 DIAGNOSIS — R92.8 ABNORMAL FINDINGS ON DIAGNOSTIC IMAGING OF BREAST: ICD-10-CM

## 2022-03-15 PROCEDURE — 77066 DX MAMMO INCL CAD BI: CPT

## 2022-03-15 PROCEDURE — G0279 TOMOSYNTHESIS, MAMMO: HCPCS

## 2022-03-15 PROCEDURE — 76642 ULTRASOUND BREAST LIMITED: CPT

## 2022-03-16 ENCOUNTER — OFFICE VISIT (OUTPATIENT)
Dept: NEUROSURGERY | Facility: CLINIC | Age: 62
End: 2022-03-16
Payer: MEDICARE

## 2022-03-16 VITALS
DIASTOLIC BLOOD PRESSURE: 78 MMHG | WEIGHT: 293 LBS | SYSTOLIC BLOOD PRESSURE: 128 MMHG | TEMPERATURE: 96.7 F | HEIGHT: 68 IN | HEART RATE: 88 BPM | BODY MASS INDEX: 44.41 KG/M2 | OXYGEN SATURATION: 99 %

## 2022-03-16 DIAGNOSIS — I67.1 LEFT CAVERNOUS CAROTID ANEURYSM: Primary | ICD-10-CM

## 2022-03-16 PROCEDURE — 99214 OFFICE O/P EST MOD 30 MIN: CPT | Performed by: NEUROLOGICAL SURGERY

## 2022-03-16 NOTE — PROGRESS NOTES
Neurosurgery Office Note  Soo Greer 59 y o  female MRN: [id number]      Assessment/Plan     Left cavernous carotid aneurysm  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  Plan:   · Reviewed imaging with patient  · Recommend continuing healthy habits such as eating a balanced diet and exercise regularly  · Extensively discussed natural history of aneurysms  Discussed that based on ISUIA patient has a 0%/5yr risk of aneurysm rupture  Discussed modifiable risk factors including hypertension, hyperlipidemia, and smoking  Patient does endorse history of hypertension hyperlipidemia on medication  She denies any smoking history  She denies any family history of aneurysms or sudden death  · Discussed signs and symptoms of aneurysm rupture including severe sudden onset headache, neck pain, nausea vomiting, and seizure  Reiterated that the symptoms should prompt patient to visit in emergency department immediately  · Spoke with patient about family screening with MRA  · Patient will follow-up in 2 years with repeat CTA head with without contrast or sooner symptoms worsen  · Patient made aware to seek care sooner if he develops any new or worsening neurological changes or red flag signs  · Patient made aware to contact Neurosurgery with any further questions or concerns           Diagnoses and all orders for this visit:    Left cavernous carotid aneurysm  -     CTA head w wo contrast; Future            CHIEF COMPLAINT    Chief Complaint   Patient presents with    Follow-up     Left cavernous carotid aneurysm       HISTORY    History of Present Illness     59y o  year old female with past medical history significant for type 2 diabetes, asthma, obstructive sleep apnea, hypertension, migraines, hyperlipidemia, PMR, and obesity  Patient seen in outpatient office today for 1 year follow-up for left cavernous aneurysms  These were originally discovered when patient presented with complaints of earache and multiple other complaints on 09/16/2019  As part of her evaluation she underwent a CTA of her head which demonstrated incidental left carotid artery aneurysm  Patient follows with rheumatology for polymyalgia rheumatica, she was trying to wean off of steroids and was down to 2 mg, patient states her body just went downhill she was unable to move her legs  Last Friday she was increased to 4 mg and states slow improvements  She also follows with Neurology for migraines  Patient does endorse some dizziness with positional changes which only last a few seconds or minutes  She also endorses some shortness of breath with exertion which has been ongoing  She denies any recent falls or traumas but states she feels as though her balance has worsened and she relies more on the cane  She denies any headaches, blurry vision, chest pain, abdominal pain, nausea, vomiting, diarrhea, no problems with bowel or bladder, no new weakness or numbness/tingling  Slight right eyelid droop noted, patient states other providers have also made that comment  HPI    See Discussion    REVIEW OF SYSTEMS    Review of Systems   Constitutional: Positive for fatigue  HENT: Negative  Eyes: Positive for visual disturbance (blurry vision)  Respiratory: Negative  Cardiovascular: Negative  Gastrointestinal: Negative  Endocrine: Negative  Genitourinary: Negative  Musculoskeletal: Positive for gait problem  Skin: Negative  Allergic/Immunologic: Negative  Neurological: Positive for dizziness, numbness (arms and legs) and headaches  Hematological: Negative  Psychiatric/Behavioral: Negative        ROS reviewed with patient and agree and changes were made as needed    Meds/Allergies Current Outpatient Medications   Medication Sig Dispense Refill    albuterol (Proventil HFA) 90 mcg/act inhaler Inhale 2 puffs every 6 (six) hours as needed for wheezing For another 24 hours use every 4 hours standing 6 7 g 0    aspirin (ADULT ASPIRIN EC LOW STRENGTH) 81 mg EC tablet Take 81 mg by mouth daily       atorvastatin (LIPITOR) 40 mg tablet TAKE 1 TABLET BY MOUTH  DAILY 90 tablet 3    Blood Glucose Monitoring Suppl (OneTouch Verio) w/Device KIT Use 3 (three) times a day 1 kit 0    Blood Pressure Monitor KIT Check blood pressures BID 1 each 0    budesonide-formoterol (SYMBICORT) 80-4 5 MCG/ACT inhaler Inhale 2 puffs 2 (two) times a day Rinse mouth after use   1 Inhaler 5    Cholecalciferol (HM Vitamin D3) 100 MCG (4000 UT) CAPS 4000 units daily 60 capsule 5    Diclofenac Sodium POWD       diltiazem (CARDIZEM CD) 240 mg 24 hr capsule Take 2 capsules (480 mg total) by mouth daily 180 capsule 1    ferrous sulfate 324 (65 Fe) mg Take 1 tablet (324 mg total) by mouth every other day (Patient taking differently: Take 324 mg by mouth daily before breakfast  ) 45 tablet 3    gabapentin (NEURONTIN) 100 mg capsule Take 1 capsule in am and 2-3 capsules at night 90 capsule 3    glucose blood (OneTouch Verio) test strip Use 1 each 3 (three) times a day 300 each 3    hydrochlorothiazide (HYDRODIURIL) 25 mg tablet TAKE 1 TABLET BY MOUTH  DAILY 90 tablet 3    Insulin Pen Needle (BD Pen Needle Tita U/F) 32G X 4 MM MISC Use 2 (two) times a day 200 each 1    Insulin Regular Human, Conc, (HumuLIN R U-500 KwikPen) 500 units/mL CONCENTRATED U-500 injection pen INJECT SUBCUTANEOUSLY 80   UNITS BEFORE BREAKFAST AND  30 UNITS BEFORE DINNER 36 mL 3    Lancets (OneTouch Delica Plus [id number]) MISC Use 1 each 3 (three) times a day 300 each 3    losartan (COZAAR) 100 MG tablet TAKE 1 TABLET BY MOUTH  DAILY 90 tablet 3    magnesium oxide (MAG-OX) 400 mg tablet magnesium oxide 400 mg (241 3 mg magnesium) tablet TAKE 1 TABLET BY MOUTH EVERY DAY      metFORMIN (GLUCOPHAGE-XR) 500 mg 24 hr tablet TAKE 2 TABLETS BY MOUTH  TWICE DAILY WITH MEALS 360 tablet 3    predniSONE 1 mg tablet Combine to take 9 mg once daily  Decrease by 1 mg every 4 weeks  (Patient taking differently: Take 4 mg by mouth daily Combine to take 9 mg once daily  Decrease by 1 mg every 4 weeks  ) 360 tablet 1    Riboflavin 400 MG TABS Take 1 tablet (400 mg total) by mouth daily 30 tablet 0    topiramate (TOPAMAX) 100 mg tablet TAKE 1 TABLET BY MOUTH AT  BEDTIME 90 tablet 3    urea 40 % LOTN lotion urea 40 % lotion   APPLY TO THE AFFECTED AREA(S) BY TOPICAL ROUTE 2 TIMES PER DAY       No current facility-administered medications for this visit         No Known Allergies    PAST HISTORY    Past Medical History:   Diagnosis Date    Anemia     Arthritis     Diabetes mellitus (HCC)     Dyspnea on exertion     Hyperlipidemia     Hypertension     Legally blind 2010    Mild intermittent asthma with exacerbation 8/4/2018    Miscarriage     x 3    Polymyalgia rheumatica (HCC) 11/24/2021    Seizures ([facility])     Sickle cell trait ([facility] [address] )     Sleep apnea     Thyroid nodule 3/6/2020       Past Surgical History:   Procedure Laterality Date    CATARACT EXTRACTION Bilateral     august and september    EYE SURGERY      HYSTERECTOMY      42    OOPHORECTOMY Left     39    FL TEMPORAL ARTERY LIGATN OR BX Bilateral 10/17/2019    Procedure: BIOPSY ARTERY TEMPORAL;  Surgeon: Effie Shaffer DO;  Location: [facility];  Service: Vascular    US GUIDED THYROID BIOPSY  5/13/2020       Social History     Tobacco Use    Smoking status: Never Smoker    Smokeless tobacco: Never Used   Vaping Use    Vaping Use: Never used   Substance Use Topics    Alcohol use: Never     Alcohol/week: 0 0 standard drinks    Drug use: No       Family History   Problem Relation Age of Onset    Heart attack Mother     Heart disease Mother     Hypertension Mother     No Known Problems Father     Heart disease Sister     No Known Problems Brother     No Known Problems Son     No Known Problems Daughter     Heart attack Maternal Grandmother     Heart disease Maternal Grandmother     Diabetes Other     Heart attack Other     No Known Problems Sister     No Known Problems Sister     No Known Problems Paternal Aunt     No Known Problems Son     Cancer Neg Hx     Stroke Neg Hx          Above history personally reviewed  EXAM    Vitals:Blood pressure 128/78, pulse 88, temperature (!) 96 7 °F (35 9 °C), temperature source Temporal, height 5' 8" (1 727 m), weight (!) 147 kg (324 lb), SpO2 99 %, not currently breastfeeding  ,Body mass index is 49 26 kg/m²  Physical Exam  Vitals reviewed  Constitutional:       General: She is awake  She is not in acute distress  Appearance: Normal appearance  She is not ill-appearing  HENT:      Head: Normocephalic and atraumatic  Eyes:      Extraocular Movements: Extraocular movements intact and EOM normal       Conjunctiva/sclera: Conjunctivae normal       Pupils: Pupils are equal, round, and reactive to light  Cardiovascular:      Rate and Rhythm: Normal rate  Pulmonary:      Effort: Pulmonary effort is normal  No respiratory distress  Chest:      Chest wall: No tenderness  Abdominal:      General: There is no distension  Palpations: Abdomen is soft  Tenderness: There is no abdominal tenderness  Musculoskeletal:         General: Normal range of motion  Cervical back: Normal range of motion and neck supple  Skin:     General: Skin is warm and dry  Neurological:      Mental Status: She is alert and oriented to person, place, and time  Coordination: Finger-Nose-Finger Test normal       Deep Tendon Reflexes: Strength normal       Reflex Scores:       Bicep reflexes are 1+ on the right side and 1+ on the left side  Patellar reflexes are 1+ on the right side and 1+ on the left side    Psychiatric: Attention and Perception: Attention and perception normal          Mood and Affect: Mood and affect normal          Speech: Speech normal          Behavior: Behavior normal  Behavior is cooperative  Thought Content: Thought content normal          Cognition and Memory: Cognition and memory normal          Judgment: Judgment normal          Neurologic Exam     Mental Status   Oriented to person, place, and time  Follows 2 step commands  Attention: normal  Concentration: normal    Speech: speech is normal   Level of consciousness: alert  Knowledge: good  Able to perform simple calculations  Able to name object  Able to repeat  Normal comprehension  Cranial Nerves     CN III, IV, VI   Pupils are equal, round, and reactive to light  Extraocular motions are normal    CN III: no CN III palsy  CN VI: no CN VI palsy  Nystagmus: none   Diplopia: none  Conjugate gaze: present    CN V   Facial sensation intact  CN VII   Facial expression full, symmetric  CN VIII   CN VIII normal    Hearing: intact    CN IX, X   CN IX normal      CN XI   CN XI normal      CN XII   CN XII normal    Except slight R eyelid droop     Motor Exam   Muscle bulk: normal  Overall muscle tone: normal  Right arm pronator drift: absent  Left arm pronator drift: absent    Strength   Strength 5/5 throughout       Sensory Exam   Light touch normal    Proprioception normal    JPS and DST intact     Gait, Coordination, and Reflexes     Gait  Gait: (walking with cane )    Coordination   Finger to nose coordination: normal    Tremor   Resting tremor: absent  Intention tremor: absent  Action tremor: absent    Reflexes   Right biceps: 1+  Left biceps: 1+  Right patellar: 1+  Left patellar: 1+  Right Lorenzana: absent  Left Lorenzana: absent  Right ankle clonus: absent  Left ankle clonus: absent        MEDICAL DECISION MAKING    Imaging Studies:     CTA head w wo contrast    Result Date: 3/8/2022  Narrative: CTA BRAIN WITH CONTRAST INDICATION: I67 1: Cerebral aneurysm, nonruptured COMPARISON:   CT 3/12/2021 TECHNIQUE:   Post contrast imaging was performed after administration of iodinated contrast through the neck and brain  Post contrast axial 0 625 mm images timed to opacify the arterial system  3D rendering was performed on an independent workstation  MIP reconstructions performed  Coronal reconstructions were performed of the noncontrast portion of the brain  Radiation dose length product (DLP) for this visit:  1719 17 mGy-cm   This examination, like all CT scans performed in the [facility], was performed utilizing techniques to minimize radiation dose exposure, including the use of iterative reconstruction and automated exposure control  IV Contrast:  100 mL of iohexol (OMNIPAQUE)  IMAGE QUALITY:   Diagnostic FINDINGS: NONCONTRAST BRAIN:  PARENCHYMA:  No intracranial mass, mass effect or midline shift  No CT signs of acute infarction  No acute parenchymal hemorrhage  Volume loss, greater than would be expected for age  VENTRICLES AND EXTRA-AXIAL SPACES:  Ex vacuo hydrocephalus  VISUALIZED ORBITS AND PARANASAL SINUSES:  Bilateral lens INTRACRANIAL VASCULATURE INTERNAL CAROTID ARTERIES:  Scattered atherosclerotic changes throughout the cavernous carotid arteries bilaterally  Left ICA is dominant related to prominence of the ipsilateral anterior cerebral artery  There are 2 small aneurysms, approximately 3-4 mm in greatest linear dimension to include a blisterlike aneurysm from the lateral wall of the mid left cavernous ICA of 5:185, and an aneurysm which descends medially at the junction of the distal left cavernous and supraclinoid ICA, 5:181  ANTERIOR CIRCULATION:  Left A1 is dominant, hypoplasia right A1  Anterior communicating artery is patent  MIDDLE CEREBRAL ARTERY CIRCULATION:  M1 segment and middle cerebral artery branches demonstrate normal enhancement bilaterally   DISTAL VERTEBRAL ARTERIES:  Normal distal vertebral arteries  Left AICA/PICA and right Posterior inferior cerebellar artery origins are normal  Normal vertebral basilar junction  BASILAR ARTERY:  Basilar artery is normal in caliber  Normal superior cerebellar arteries  POSTERIOR CEREBRAL ARTERIES: Both posterior cerebral arteries arises from the basilar tip  Both arteries demonstrate normal enhancement  DURAL VENOUS SINUSES:  Normal  NON VASCULAR ANATOMY BONY STRUCTURES:  No acute osseous abnormality  Impression: No acute intracranial disease, volume loss greater than expected for age  Two, 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  Workstation performed: [id number]     CT chest w contrast    Result Date: 2/23/2022  Narrative: CT CHEST WITH IV CONTRAST INDICATION:   I77 819: Aortic ectasia, unspecified site I28 8: Other diseases of pulmonary vessels R91 8: Other nonspecific abnormal finding of lung field  COMPARISON:  None  TECHNIQUE: CT examination of the chest was performed  Axial, sagittal, and coronal 2D reformatted images were created from the source data and submitted for interpretation  Radiation dose length product (DLP) for this visit:  608 mGy-cm   This examination, like all CT scans performed in the [facility], was performed utilizing techniques to minimize radiation dose exposure, including the use of iterative reconstruction and automated exposure control  IV Contrast:  85 mL of iohexol (OMNIPAQUE) FINDINGS: LUNGS:  Previously described right middle lobe groundglass nodule is no longer identified  Stable 4 mm right middle lobe subpleural nodules noted series 3 image 60  No new or suspicious lesions are identified     There is no tracheal or endobronchial lesion  PLEURA:  Unremarkable  HEART/GREAT VESSELS: Heart is unremarkable for patient's age  Stable ectasia of the ascending thoracic aorta is identified measuring 4 2 cm  MEDIASTINUM AND CRISTAL:  Unremarkable   CHEST WALL AND LOWER NECK: Unremarkable  VISUALIZED STRUCTURES IN THE UPPER ABDOMEN:  Cholelithiasis is present  OSSEOUS STRUCTURES:  No acute fracture or destructive osseous lesion  Impression: Stable ectasia of the ascending thoracic aorta measuring 4 2 cm  Benign pulmonary nodule  Workstation performed: [id number]     Mammo diagnostic bilateral w 3d & cad, US breast bilateral limited (diagnostic)    Result Date: 3/15/2022  Narrative: DIAGNOSIS: Abnormal findings on diagnostic imaging of breast TECHNIQUE: Digital diagnostic mammography was performed  Computer Aided Detection (CAD) analyzed all applicable images  Ultrasound of the bilateral breast(s) was performed  COMPARISONS: Prior breast imaging dated: 07/18/2019, 07/18/2019, 01/17/2019, 01/17/2019, and 01/11/2019 RELEVANT HISTORY: Family Breast Cancer History: No known family history of breast cancer  Family Medical History: No known relevant family medical history  Personal History: No known relevant hormone history  Surgical history includes hysterectomy and oophorectomy  No known relevant medical history  RISK ASSESSMENT: 5 Year Tyrer-Cuzick: 1 63 % 10 Year Tyrer-Cuzick: 3 04 % Lifetime Tyrer-Cuzick: 6 87 % TISSUE DENSITY: There are scattered areas of fibroglandular density  INDICATION: Anayeli Trevino is a 59 y o  female presenting for DX FU recommendation from 2019  FINDINGS: LEFT A) CALCIFICATIONS Mammo diagnostic bilateral w 3d & cad: There are round calcifications in a grouped distribution seen in the central region of the left breast in the middle depth, 8 cm from the nipple  There are no similar findings  Compared to the previous study, there are no significant changes  US breast bilateral limited (diagnostic): There are no corresponding calcifications seen on this modality  B) MASS Mammo diagnostic bilateral w 3d & cad: There is a 6 mm x 4 mm x 7 mm oval mass with circumscribed margins seen in the left breast at 4 o'clock in the middle depth, 9 cm from the nipple  Compared to the previous study, there are no significant changes  US breast bilateral limited (diagnostic): There is a 6 mm x 4 mm x 7 mm oval mass with circumscribed margins seen in the left breast at 4 o'clock in the middle depth, 9 cm from the nipple  Compared to the previous study, there are no significant changes  RIGHT C) FOCAL ASYMMETRY Mammo diagnostic bilateral w 3d & cad: There is a focal asymmetry seen in the upper outer quadrant of the right breast in the anterior depth, 4 cm from the nipple  Compared to the previous study, the asymmetry is less defined  Ultrasound of the area shows 1 stable pre-existing small benign type nodule measuring 6 mm at about the 9 position  D) MASS Mammo diagnostic bilateral w 3d & cad: There is an 8 mm oval mass with circumscribed margins seen in the right breast at 10 o'clock in the anterior depth, 3 5 cm from the nipple on the MLO view  US breast bilateral limited (diagnostic): There is an 8 mm x 3 mm x 7 mm oval, parallel, hypoechoic mass with circumscribed margins seen in the right breast at 10 o'clock, 4 cm from the nipple  The mass correlates with the prior mammogram finding  There are multiple lymph nodes in the left upper outer quadrant of the breast and left axilla which are similar to the prior study  Impression: Most of the pre-existing findings are stable compared with earlier exams and are considered benign without need for specific surveillance  The only new finding on the right is a small probably benign hypoechoic nodule at the 10 position about 3-4 cm from nipple  A six-month follow-up ultrasound reassessment is recommended for that finding  ASSESSMENT/BI-RADS CATEGORY: Left: 2 - Benign Right: 3 - Probably Benign Overall: 3 - Probably Benign RECOMMENDATION:      - Routine screening mammogram in 1 year for the left breast       - Ultrasound in 6 months for the right breast  Workstation ID: [id number]       I have personally reviewed pertinent reports  and I have personally reviewed pertinent films in PACS

## 2022-03-16 NOTE — ASSESSMENT & PLAN NOTE
Patient made aware of 24/7 emergency services. 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  Plan:   · Reviewed imaging with patient  · Recommend continuing healthy habits such as eating a balanced diet and exercise regularly  · Extensively discussed natural history of aneurysms  Discussed that based on ISUIA patient has a 0%/5yr risk of aneurysm rupture  Discussed modifiable risk factors including hypertension, hyperlipidemia, and smoking  Patient does endorse history of hypertension hyperlipidemia on medication  She denies any smoking history  She denies any family history of aneurysms or sudden death  · Discussed signs and symptoms of aneurysm rupture including severe sudden onset headache, neck pain, nausea vomiting, and seizure  Reiterated that the symptoms should prompt patient to visit in emergency department immediately  · Spoke with patient about family screening with MRA  · Patient will follow-up in 2 years with repeat CTA head with without contrast or sooner symptoms worsen  · Patient made aware to seek care sooner if he develops any new or worsening neurological changes or red flag signs  · Patient made aware to contact Neurosurgery with any further questions or concerns

## 2022-03-16 NOTE — ASSESSMENT & PLAN NOTE
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  Plan:   · Recommend continuing healthy habits such as eating a balanced diet and exercise regularly  · Extensively discussed natural history of aneurysms  Discussed modifiable risk factors including hypertension, hyperlipidemia, and smoking  Patient does endorse history of hypertension hyperlipidemia on medication  She denies any family history of aneurysms or sudden death  She denies smoking history  · Discussed signs symptoms of aneurysm rupture including severe, sudden onset headache, neck pain, nausea vomiting, and seizure  Reiterated that the symptoms should prompt patient visit in emergency department immediately  · Patient will follow-up in 2 years with repeat CTA head or sooner symptoms worsen  · Patient made aware to seek care sooner if she develops any new or worsening neurological changes or red flag signs  · Patient made aware to contact Neurosurgery with any further questions or concerns

## 2022-04-07 ENCOUNTER — OFFICE VISIT (OUTPATIENT)
Dept: SLEEP CENTER | Facility: CLINIC | Age: 62
End: 2022-04-07
Payer: MEDICARE

## 2022-04-07 VITALS
SYSTOLIC BLOOD PRESSURE: 161 MMHG | DIASTOLIC BLOOD PRESSURE: 76 MMHG | OXYGEN SATURATION: 100 % | WEIGHT: 293 LBS | BODY MASS INDEX: 44.41 KG/M2 | HEART RATE: 98 BPM | HEIGHT: 68 IN

## 2022-04-07 DIAGNOSIS — E66.01 MORBID OBESITY (HCC): ICD-10-CM

## 2022-04-07 DIAGNOSIS — G47.33 OBSTRUCTIVE SLEEP APNEA (ADULT) (PEDIATRIC): Primary | ICD-10-CM

## 2022-04-07 PROCEDURE — 99213 OFFICE O/P EST LOW 20 MIN: CPT | Performed by: NURSE PRACTITIONER

## 2022-04-07 NOTE — PATIENT INSTRUCTIONS
1   Continue use of CPAP equipment nightly, at your discretion  2  Continue to clean your equipment, as discussed  3  Contact the Sleep [address] [place] [address] with any questions or concerns prior to your next visit, as needed  4  Schedule visit for follow-up in 6 months    Continuous Positive Airway Pressure (CPAP) therapy was prescribed to you as a medical necessity and there are risks of discontinuing  use of the device, some of which may be long term  Symptoms you experienced before using CPAP may return such as snoring, apneas, excessive daytime sleepiness, hypertension, cardiac arrhythmias, risk of stroke, congestive heart-failure, exacerbation of COPD and potential respiratory failure  Ultimately, it is a personal decision for you to make if you continue use of an affected device or discontinue until a replacement is provided  Unfortunately, [employer] has not yet provided us with information about available devices  Registering your device will allow you to receive up to date information regarding the recall  You can visit their website at [email or web address] to register your device and to learn more about how [employer] plans to replace your device  OR  You can call them to register your device and ask any questions at:  [phone]    It is advised that you do not use any type of ozone  for your PAP equipment  Nursing Support:  When: Monday through Friday 7A-5PM except holidays  Where: Our direct line is [phone]  If you are having a true emergency please call 911  In the event that the line is busy or it is after hours please leave a voice message and we will return your call  Please speak clearly, leaving your full name, birth date, best number to reach you and the reason for your call  Medication refills: We will need the name of the medication, the dosage, the ordering provider, whether you get a 30 or 90 day refill, and the pharmacy name and address  Medications will be ordered by the provider only  Nurses cannot call in prescriptions  Please allow 7 days for medication refills  Physician requested updates: If your provider requested that you call with an update after starting medication, please be ready to provide us the medication and dosage, what time you take your medication, the time you attempt to fall asleep, time you fall asleep, when you wake up, and what time you get out of bed  Sleep Study Results: We will contact you with sleep study results and/or next steps after the physician has reviewed your testing

## 2022-04-07 NOTE — PROGRESS NOTES
Progress Note - [facility] [address] [place] [address] 59 y o  female   :1960, MRN: [id number]  2022          Follow Up Evaluation / Problem:     Obstructive Sleep Apnea  Morbid Obesity      Thank you for the opportunity of participating in the evaluation and care of this patient in the Sleep Clinic at [facility]  HPI: Kimberly Heard is a 59y o  year old female  The patient presents for follow up of mild obstructive sleep apnea  She was diagnosed with MOHAN with obesity hypoventilation in May 2020  A home sleep study indicated an MAILE of 7 4 with oxygen joel of 81% with 35% of the study at levels less than 90%  (A previous home study noted MAILE of 11 9 however, she did not pursue treatment)  She has been treated with CPAP equipment and presents to review compliance and effectiveness of treatment      Review of Systems      Genitourinary none   Cardiology ankle/leg swelling   Gastrointestinal none   Neurology none   Constitutional none   Integumentary none   Psychiatry none   Musculoskeletal leg cramps   Pulmonary shortness of breath with activity   ENT none   Endocrine none   Hematological none       Current Outpatient Medications:     albuterol (Proventil HFA) 90 mcg/act inhaler, Inhale 2 puffs every 6 (six) hours as needed for wheezing For another 24 hours use every 4 hours standing, Disp: 6 7 g, Rfl: 0    aspirin (ADULT ASPIRIN EC LOW STRENGTH) 81 mg EC tablet, Take 81 mg by mouth daily , Disp: , Rfl:     atorvastatin (LIPITOR) 40 mg tablet, TAKE 1 TABLET BY MOUTH  DAILY, Disp: 90 tablet, Rfl: 3    Blood Glucose Monitoring Suppl (OneTouch Verio) w/Device KIT, Use 3 (three) times a day, Disp: 1 kit, Rfl: 0    Blood Pressure Monitor KIT, Check blood pressures BID, Disp: 1 each, Rfl: 0    budesonide-formoterol (SYMBICORT) 80-4 5 MCG/ACT inhaler, Inhale 2 puffs 2 (two) times a day Rinse mouth after use , Disp: 1 Inhaler, Rfl: 5    Cholecalciferol (HM Vitamin D3) 100 MCG (4000 UT) CAPS, 4000 units daily, Disp: 60 capsule, Rfl: 5    Diclofenac Sodium POWD, , Disp: , Rfl:     diltiazem (CARDIZEM CD) 240 mg 24 hr capsule, Take 2 capsules (480 mg total) by mouth daily, Disp: 180 capsule, Rfl: 1    ferrous sulfate 324 (65 Fe) mg, Take 1 tablet (324 mg total) by mouth every other day (Patient taking differently: Take 324 mg by mouth daily before breakfast  ), Disp: 45 tablet, Rfl: 3    gabapentin (NEURONTIN) 100 mg capsule, Take 1 capsule in am and 2-3 capsules at night, Disp: 90 capsule, Rfl: 3    glucose blood (OneTouch Verio) test strip, Use 1 each 3 (three) times a day, Disp: 300 each, Rfl: 3    hydrochlorothiazide (HYDRODIURIL) 25 mg tablet, TAKE 1 TABLET BY MOUTH  DAILY, Disp: 90 tablet, Rfl: 3    Insulin Pen Needle (BD Pen Needle Tita U/F) 32G X 4 MM MISC, Use 2 (two) times a day, Disp: 200 each, Rfl: 1    Insulin Regular Human, Conc, (HumuLIN R U-500 KwikPen) 500 units/mL CONCENTRATED U-500 injection pen, INJECT SUBCUTANEOUSLY 80   UNITS BEFORE BREAKFAST AND  30 UNITS BEFORE DINNER, Disp: 36 mL, Rfl: 3    Lancets (OneTouch Delica Plus JKCDLH21I) MISC, Use 1 each 3 (three) times a day, Disp: 300 each, Rfl: 3    losartan (COZAAR) 100 MG tablet, TAKE 1 TABLET BY MOUTH  DAILY, Disp: 90 tablet, Rfl: 3    magnesium oxide (MAG-OX) 400 mg tablet, magnesium oxide 400 mg (241 3 mg magnesium) tablet  TAKE 1 TABLET BY MOUTH EVERY DAY, Disp: , Rfl:     metFORMIN (GLUCOPHAGE-XR) 500 mg 24 hr tablet, TAKE 2 TABLETS BY MOUTH  TWICE DAILY WITH MEALS, Disp: 360 tablet, Rfl: 3    predniSONE 1 mg tablet, Combine to take 9 mg once daily  Decrease by 1 mg every 4 weeks  (Patient taking differently: Take 4 mg by mouth daily Combine to take 9 mg once daily   Decrease by 1 mg every 4 weeks  ), Disp: 360 tablet, Rfl: 1    Riboflavin 400 MG TABS, Take 1 tablet (400 mg total) by mouth daily, Disp: 30 tablet, Rfl: 0    topiramate (TOPAMAX) 100 mg tablet, TAKE 1 TABLET BY MOUTH AT  BEDTIME, Disp: 90 tablet, Rfl: 3    urea 40 % LOTN lotion, urea 40 % lotion  APPLY TO THE AFFECTED AREA(S) BY TOPICAL ROUTE 2 TIMES PER DAY, Disp: , Rfl:     Butler Sleepiness Scale  Sitting and reading: Slight chance of dozing  Watching TV: High chance of dozing  Sitting, inactive in a public place (e g  a theatre or a meeting): Slight chance of dozing  As a passenger in a car for an hour without a break: Moderate chance of dozing  Lying down to rest in the afternoon when circumstances permit: High chance of dozing  Sitting and talking to someone: Slight chance of dozing  Sitting quietly after a lunch without alcohol: Moderate chance of dozing  In a car, while stopped for a few minutes in traffic: Would never doze  Total score: 13              Vitals:    04/07/22 1321   BP: 161/76   BP Location: Left arm   Patient Position: Sitting   Cuff Size: Large   Pulse: 98   SpO2: 100%   Weight: (!) 143 kg (316 lb)   Height: 5' 8" (1 727 m)       Body mass index is 48 05 kg/m²  EPWORTH SLEEPINESS SCORE  Total score: 13      Past History Since Last Sleep Center Visit:   Since her last visit, she has not been able to resume use of her CPAP equipment, due to a bed bug infestation  She was sleeping in chair instead of her bed, but that also became infested  She reports that she has not been able to sleep due to bites from the insects as well as anxiety regarding "being attacked all night long"  She has worked on remediation herself, which has been extremely difficult for her  She reports that her landlord was not helping  She has not turned on her equipment and has not checked to see if the insects have gotten into her CPAP  equipment  She is working with a representative at [employer] to find other housing  Her CPAP equipment is recalled  She has not been able to register the equipment with the         The review of systems and following portions of the patient's history were reviewed and updated as appropriate: allergies, current medications, past family history, past medical history, past social history, past surgical history, and problem list         OBJECTIVE  Equipment set up date:  11/5/2020  PAP Pressure: Nasal AutoPAP using a lower limit of 4 cm and an upper limit of 20 cm of water pressure  Type of mask used: full face  DME Provider: [employer]    Physical Exam:     General Appearance:   Alert, cooperative, no distress, appears stated age, morbidly obese     Head:   Normocephalic, without obvious abnormality, atraumatic     Eyes:   PERRL, conjunctiva clear/corneas clouded, EOM's intact, vision impaired          Nose:  Nares normal, septum midline, no drainage or sinus tenderness           Throat:  Lips, teeth and gums normal; tongue normal size and midline mucosa moist with low-lying soft palatal tissue, uvula barely visualized, tonsils not visualized, Mallampati class 4       Neck:  Supple, symmetrical, trachea midline, no adenopathy; Thyroid: No enlargement, tenderness or nodules; no carotid bruit or JVD     Lungs:      Clear to auscultation bilaterally, respirations unlabored     Heart:   Regular rate and rhythm, S1 and S2 normal, no murmur, rub or gallop       Extremities:  Extremities normal, atraumatic, no cyanosis and +1 pitting edema in LE bilaterally       Skin:  Skin color, texture, turgor normal, no rashes or lesions       Neurologic:  Mild hand tremor noted bilaterally       ASSESSMENT / PLAN    1  Obstructive sleep apnea (adult) (pediatric)  Ambulatory Referral to Sleep Medicine   2  Morbid obesity ([facility] [address] )             Counseling / Coordination of Care  Total clinic time spent today 25 minutes  Greater than 50% of total time was spent with the patient and / or family counseling and / or coordination of care       A description of the counseling / coordination of care:     Impressions, Diagnostic results, Prognosis, Instructions for management, Risks and benefits of treatment, Patient and family education, Risk factor reductions and Importance of compliance with treatment    Today I reviewed the patient's compliance data  she has not used the equipment since January 2021  At the time of the usage, AHI was 3 6 residual events  The patient feels they benefit from the use of PAP equipment and would like to continue PAP therapy  Compliance has been poor, however, response to treatment has been good  She has a current prescription for supplies and states that she has new ones on hand  We discussed that insects can infect CPAP equipment due to warmth from the electric  She will inspect the equipment prior to restarting use  We discussed the recent recall of the patient's PAP equipment  The risks and benefits for continued use vs  Discontinuation of PAP therapy were discussed  It is ultimately the patient's decision whether or not to continue PAP therapy at this time  The patient has not pursued registering the equipment for the recall  I have asked the [facility] representative to register the equipment for her, due to visual impairment and difficulty she has completing tasks     She does not have an ozone  and was advised to avoid this method of cleaning  She will plan to  continue to clean the equipment using mild soap and water  She will continue using this equipment at the settings noted above for the next 6 months  At that timeshe will then return for a routine follow-up evaluation  I have asked the patient to contact the [address] if she encounters any difficulties prior to that time  The following instructions have been given to the patient today:    Patient Instructions   1  Continue use of CPAP equipment nightly, at your discretion  2  Continue to clean your equipment, as discussed  3  Contact the [address] with any questions or concerns prior to your next visit, as needed  4    Schedule visit for follow-up in 6 months    Continuous Positive Airway Pressure (CPAP) therapy was prescribed to you as a medical necessity and there are risks of discontinuing  use of the device, some of which may be long term  Symptoms you experienced before using CPAP may return such as snoring, apneas, excessive daytime sleepiness, hypertension, cardiac arrhythmias, risk of stroke, congestive heart-failure, exacerbation of COPD and potential respiratory failure  Ultimately, it is a personal decision for you to make if you continue use of an affected device or discontinue until a replacement is provided  Unfortunately, [employer] has not yet provided us with information about available devices  Registering your device will allow you to receive up to date information regarding the recall  You can visit their website at [email or web address] to register your device and to learn more about how [employer] plans to replace your device  OR  You can call them to register your device and ask any questions at:  [phone]    It is advised that you do not use any type of ozone  for your PAP equipment  Nursing Support:  When: Monday through Friday 7A-5PM except holidays  Where: Our direct line is [phone]  If you are having a true emergency please call 911  In the event that the line is busy or it is after hours please leave a voice message and we will return your call  Please speak clearly, leaving your full name, birth date, best number to reach you and the reason for your call  Medication refills: We will need the name of the medication, the dosage, the ordering provider, whether you get a 30 or 90 day refill, and the pharmacy name and address  Medications will be ordered by the provider only  Nurses cannot call in prescriptions  Please allow 7 days for medication refills  Physician requested updates:  If your provider requested that you call with an update after starting medication, please be ready to provide us the medication and dosage, what time you take your medication, the time you attempt to fall asleep, time you fall asleep, when you wake up, and what time you get out of bed  Sleep Study Results: We will contact you with sleep study results and/or next steps after the physician has reviewed your testing        Bart Garvey, [facility]

## 2022-04-27 ENCOUNTER — OFFICE VISIT (OUTPATIENT)
Dept: CARDIOLOGY CLINIC | Facility: CLINIC | Age: 62
End: 2022-04-27
Payer: MEDICARE

## 2022-04-27 VITALS
HEIGHT: 68 IN | SYSTOLIC BLOOD PRESSURE: 136 MMHG | DIASTOLIC BLOOD PRESSURE: 60 MMHG | WEIGHT: 293 LBS | HEART RATE: 86 BPM | BODY MASS INDEX: 44.41 KG/M2

## 2022-04-27 DIAGNOSIS — R60.9 EDEMA, UNSPECIFIED TYPE: ICD-10-CM

## 2022-04-27 DIAGNOSIS — I71.2 THORACIC AORTIC ANEURYSM WITHOUT RUPTURE (HCC): Primary | ICD-10-CM

## 2022-04-27 PROCEDURE — 93000 ELECTROCARDIOGRAM COMPLETE: CPT | Performed by: INTERNAL MEDICINE

## 2022-04-27 PROCEDURE — 99214 OFFICE O/P EST MOD 30 MIN: CPT | Performed by: INTERNAL MEDICINE

## 2022-04-27 RX ORDER — FUROSEMIDE 20 MG/1
20 TABLET ORAL DAILY
Qty: 90 TABLET | Refills: 3 | Status: SHIPPED | OUTPATIENT
Start: 2022-04-27

## 2022-04-27 NOTE — PROGRESS NOTES
Cardiology             Lauren Santoyo  1960  [id number]                Assessment/Plan:     Hypertension  Stable ascending thoracic aortic aneurysm, 4 2 cm-last CT chest 09/2019  Hypertensive heart disease with mild-to-moderate concentric hypertrophy  Dyslipidemia  Diabetes  Polymyalgia rheumatica, chronic steroid use  Cerebral artery aneurysms  Chronic/stable exertional dyspnea with normal coronary angiography 01/2019        Blood pressure controlled, continue diltiazem, losartan  Continue atorvastatin for dyslipidemia    will defer routine lipid panel to PCP   Light of extremity edema, patient agreeable to changing HCTZ to furosemide  Recommend increasing dietary potassium intake  Will check BMP and NT proBNP in 3 weeks and follow up with her in 6 weeks  Can consider up titrating furosemide to help with lower extremity edema  Prior NT proBNP 03/2020 was normal at 38 pg/mL  Heart healthy diet, exercise, weight loss has been strongly recommended         Follow-up in 1 year          Interval History:      Dali Randhawa a 59 y  o  female  with past medical history of diabetes since 2003, normal coronary arteries from 1/2019, microalbuminemia, neuropathy, hypertension with left ventricular hypertrophy, family history of cardiovascular disease in her mother who had sudden cardiac death at 66years old, aneurysmal ascending aorta measuring up to 4 3 cm, prominent main pulmonary artery at 3 5 cm, recently diagnosed reportedly mild sleep apnea, asthma, migraines, remote history of seizures  Chelsea Moore has been diagnosed with cerebral aneurysm and PMR as well      Prior CT chest 09/2019 4 2 cm ascending thoracic aortic aneurysm, stable and 3 7 cm main pulmonary artery is ectasia   Prior CT head 03/06/2020 revealed 4 mm aneurysm arising from distal cavernous segment of left ICA and inferolaterally projects 4 mm aneurysm arising from the junction of cavernous and supraclinoid left ICA, stable      Echocardiogram 09/2019 revealed ejection fraction 60% with mild-to-moderate concentric hypertrophy      She presents today for follow-up with complaints of she states has been chronic  She typically has much less edema when she wakes up in the morning, but as soon as she is upright she states her swelling increases quite a bit  She denies chest pain, has chronic but stable exertional dyspnea          Vitals:  Vitals:    04/27/22 1045   BP: 136/60   Pulse: 86   Weight: (!) 149 kg (328 lb)   Height: 5' 8" (1 727 m)         Past Medical History:   Diagnosis Date    Anemia     Arthritis     Diabetes mellitus ([place] [address] )     Dyspnea on exertion     Hyperlipidemia     Hypertension     Legally blind 2010    Mild intermittent asthma with exacerbation 8/4/2018    Miscarriage     x 3    Polymyalgia rheumatica ([place] [address] ) 11/24/2021    Seizures ([place] [address] )     Sickle cell trait ([place] [address] )     Sleep apnea     Thyroid nodule 3/6/2020     Social History     Socioeconomic History    Marital status: /Civil Union     Spouse name: Not on file    Number of children: Not on file    Years of education: Not on file    Highest education level: Not on file   Occupational History    Occupation: long term disability   Tobacco Use    Smoking status: Never Smoker    Smokeless tobacco: Never Used   Vaping Use    Vaping Use: Never used   Substance and Sexual Activity    Alcohol use: Never     Alcohol/week: 0 0 standard drinks    Drug use: No    Sexual activity: Not Currently     Partners: Male     Birth control/protection: None   Other Topics Concern    Not on file   Social History Narrative    Caffeine use    Resides with spouse and one son     Social Determinants of Health     Financial Resource Strain: Not on file   Food Insecurity: Not on file   Transportation Needs: Not on file   Physical Activity: Not on file   Stress: Not on file   Social Connections: Not on file   Intimate Partner Violence: Not on file   Housing Stability: Not on file Family History   Problem Relation Age of Onset    Heart attack Mother     Heart disease Mother     Hypertension Mother     No Known Problems Father     Heart disease Sister     No Known Problems Brother     No Known Problems Son     No Known Problems Daughter     Heart attack Maternal Grandmother     Heart disease Maternal Grandmother     Diabetes Other     Heart attack Other     No Known Problems Sister     No Known Problems Sister     No Known Problems Paternal Aunt     No Known Problems Son     Cancer Neg Hx     Stroke Neg Hx      Past Surgical History:   Procedure Laterality Date    CATARACT EXTRACTION Bilateral     august and september    EYE SURGERY      HYSTERECTOMY      39    OOPHORECTOMY Left     39    MO TEMPORAL ARTERY LIGATN OR BX Bilateral 10/17/2019    Procedure: BIOPSY ARTERY TEMPORAL;  Surgeon: Bev Chaudhry DO;  [facility];  Service: Vascular    US GUIDED THYROID BIOPSY  5/13/2020       Current Outpatient Medications:     albuterol (Proventil HFA) 90 mcg/act inhaler, Inhale 2 puffs every 6 (six) hours as needed for wheezing For another 24 hours use every 4 hours standing, Disp: 6 7 g, Rfl: 0    aspirin (ADULT ASPIRIN EC LOW STRENGTH) 81 mg EC tablet, Take 81 mg by mouth daily , Disp: , Rfl:     atorvastatin (LIPITOR) 40 mg tablet, TAKE 1 TABLET BY MOUTH  DAILY, Disp: 90 tablet, Rfl: 3    budesonide-formoterol (SYMBICORT) 80-4 5 MCG/ACT inhaler, Inhale 2 puffs 2 (two) times a day Rinse mouth after use , Disp: 1 Inhaler, Rfl: 5    Cholecalciferol (HM Vitamin D3) 100 MCG (4000 UT) CAPS, 4000 units daily, Disp: 60 capsule, Rfl: 5    Diclofenac Sodium POWD, , Disp: , Rfl:     diltiazem (CARDIZEM CD) 240 mg 24 hr capsule, Take 2 capsules (480 mg total) by mouth daily, Disp: 180 capsule, Rfl: 1    ferrous sulfate 324 (65 Fe) mg, Take 1 tablet (324 mg total) by mouth every other day (Patient taking differently: Take 324 mg by mouth daily before breakfast  ), Disp: 45 tablet, Rfl: 3    Insulin Regular Human, Conc, (HumuLIN R U-500 KwikPen) 500 units/mL CONCENTRATED U-500 injection pen, INJECT SUBCUTANEOUSLY 80   UNITS BEFORE BREAKFAST AND  30 UNITS BEFORE DINNER, Disp: 36 mL, Rfl: 3    losartan (COZAAR) 100 MG tablet, TAKE 1 TABLET BY MOUTH  DAILY, Disp: 90 tablet, Rfl: 3    magnesium oxide (MAG-OX) 400 mg tablet, magnesium oxide 400 mg (241 3 mg magnesium) tablet  TAKE 1 TABLET BY MOUTH EVERY DAY, Disp: , Rfl:     metFORMIN (GLUCOPHAGE-XR) 500 mg 24 hr tablet, TAKE 2 TABLETS BY MOUTH  TWICE DAILY WITH MEALS, Disp: 360 tablet, Rfl: 3    predniSONE 1 mg tablet, Combine to take 9 mg once daily  Decrease by 1 mg every 4 weeks  (Patient taking differently: Take 4 mg by mouth daily Combine to take 9 mg once daily   Decrease by 1 mg every 4 weeks  ), Disp: 360 tablet, Rfl: 1    Riboflavin 400 MG TABS, Take 1 tablet (400 mg total) by mouth daily, Disp: 30 tablet, Rfl: 0    topiramate (TOPAMAX) 100 mg tablet, TAKE 1 TABLET BY MOUTH AT  BEDTIME, Disp: 90 tablet, Rfl: 3    Blood Glucose Monitoring Suppl (OneTouch Verio) w/Device KIT, Use 3 (three) times a day, Disp: 1 kit, Rfl: 0    Blood Pressure Monitor KIT, Check blood pressures BID, Disp: 1 each, Rfl: 0    furosemide (LASIX) 20 mg tablet, Take 1 tablet (20 mg total) by mouth daily, Disp: 90 tablet, Rfl: 3    gabapentin (NEURONTIN) 100 mg capsule, Take 1 capsule in am and 2-3 capsules at night, Disp: 90 capsule, Rfl: 3    glucose blood (OneTouch Verio) test strip, Use 1 each 3 (three) times a day, Disp: 300 each, Rfl: 3    Insulin Pen Needle (BD Pen Needle Tita U/F) 32G X 4 MM MISC, Use 2 (two) times a day, Disp: 200 each, Rfl: 1    Lancets (OneTouch Delica Plus [id number]) MISC, Use 1 each 3 (three) times a day, Disp: 300 each, Rfl: 3    urea 40 % LOTN lotion, urea 40 % lotion  APPLY TO THE AFFECTED AREA(S) BY TOPICAL ROUTE 2 TIMES PER DAY, Disp: , Rfl:         Review of Systems:  Review of Systems Constitutional: Negative for activity change, fever and unexpected weight change  HENT: Negative for facial swelling, nosebleeds and voice change  Respiratory: Negative for chest tightness, shortness of breath and wheezing  Cardiovascular: Negative for chest pain, palpitations and leg swelling  Gastrointestinal: Negative for abdominal distention  Genitourinary: Negative for hematuria  Musculoskeletal: Negative for arthralgias  Skin: Negative for color change, pallor, rash and wound  Neurological: Negative for dizziness, seizures and syncope  Psychiatric/Behavioral: Negative for agitation  Physical Exam:  Physical Exam  Vitals reviewed  Constitutional:       Appearance: She is well-developed  HENT:      Head: Normocephalic and atraumatic  Cardiovascular:      Rate and Rhythm: Normal rate and regular rhythm  Heart sounds: Normal heart sounds  Pulmonary:      Effort: Pulmonary effort is normal       Breath sounds: Normal breath sounds  Abdominal:      Palpations: Abdomen is soft  Musculoskeletal:         General: Normal range of motion  Cervical back: Normal range of motion and neck supple  Skin:     General: Skin is warm and dry  Neurological:      Mental Status: She is alert and oriented to person, place, and time  Psychiatric:         Behavior: Behavior normal          Thought Content: Thought content normal          Judgment: Judgment normal          This note was completed in part utilizing [employer] Fluency Direct Software  Grammatical errors, random word insertions, spelling mistakes, and incomplete sentences can be an occasional consequence of this system secondary to software limitations, ambient noise, and hardware issues  If you have any questions or concerns about the content, text, or information contained within the body of this dictation, please contact the provider for clarification

## 2022-05-02 NOTE — RESTORATIVE TECHNICIAN NOTE
Restorative Specialist Mobility Note       Activity: Ambulate in giraldo, Ambulate in room, Bathroom privileges, Chair, Dangle, Stand at bedside(Educated/encouraged pt to ambulate with assistance 3-4 x's/day   Assisted pt to the stretcher post ambulation )     Assistive Device: Sugey FLORES, Restorative Technician, [employer]
Restorative Specialist Mobility Note       Activity: Ambulate in giraldo, Ambulate in room, Bathroom privileges, Chair, Dangle, Stand at bedside(Educated/encouraged pt to ambulate with assistance 3-4 x's/day  Bed alarm on   Pt callbell, phone/tray within reach )     Assistive Device: Tasia FLORES, Restorative Technician, [employer]
26

## 2022-05-08 NOTE — TELEPHONE ENCOUNTER
Spoke with Vascular and they send it to their team and then then someone reaches out to the patient to schedule the US  For the Referral, they usually wait until after the [address] is done to schedule unless its requested otherwise by a Physician  Sohan Robins Thanks 13

## 2022-05-09 ENCOUNTER — TELEPHONE (OUTPATIENT)
Dept: CARDIOLOGY CLINIC | Facility: CLINIC | Age: 62
End: 2022-05-09

## 2022-05-18 ENCOUNTER — TELEPHONE (OUTPATIENT)
Dept: ENDOCRINOLOGY | Facility: CLINIC | Age: 62
End: 2022-05-18

## 2022-05-19 ENCOUNTER — OFFICE VISIT (OUTPATIENT)
Dept: ENDOCRINOLOGY | Facility: CLINIC | Age: 62
End: 2022-05-19
Payer: MEDICARE

## 2022-05-19 VITALS
HEIGHT: 68 IN | SYSTOLIC BLOOD PRESSURE: 138 MMHG | WEIGHT: 293 LBS | DIASTOLIC BLOOD PRESSURE: 70 MMHG | BODY MASS INDEX: 44.41 KG/M2 | HEART RATE: 94 BPM

## 2022-05-19 DIAGNOSIS — R80.9 TYPE 2 DIABETES MELLITUS WITH MICROALBUMINURIA, WITH LONG-TERM CURRENT USE OF INSULIN (HCC): ICD-10-CM

## 2022-05-19 DIAGNOSIS — Z79.4 TYPE 2 DIABETES MELLITUS WITH OTHER OPHTHALMIC COMPLICATION, WITH LONG-TERM CURRENT USE OF INSULIN (HCC): ICD-10-CM

## 2022-05-19 DIAGNOSIS — E11.29 TYPE 2 DIABETES MELLITUS WITH MICROALBUMINURIA, WITH LONG-TERM CURRENT USE OF INSULIN (HCC): ICD-10-CM

## 2022-05-19 DIAGNOSIS — Z79.4 TYPE 2 DIABETES MELLITUS WITH HYPOGLYCEMIA WITHOUT COMA, WITH LONG-TERM CURRENT USE OF INSULIN (HCC): ICD-10-CM

## 2022-05-19 DIAGNOSIS — E11.65 TYPE 2 DIABETES MELLITUS WITH HYPERGLYCEMIA, WITH LONG-TERM CURRENT USE OF INSULIN (HCC): Primary | ICD-10-CM

## 2022-05-19 DIAGNOSIS — E78.5 HYPERLIPIDEMIA, UNSPECIFIED HYPERLIPIDEMIA TYPE: ICD-10-CM

## 2022-05-19 DIAGNOSIS — E04.1 THYROID NODULE: ICD-10-CM

## 2022-05-19 DIAGNOSIS — E11.39 TYPE 2 DIABETES MELLITUS WITH OTHER OPHTHALMIC COMPLICATION, WITH LONG-TERM CURRENT USE OF INSULIN (HCC): ICD-10-CM

## 2022-05-19 DIAGNOSIS — Z79.4 TYPE 2 DIABETES MELLITUS WITH HYPERGLYCEMIA, WITH LONG-TERM CURRENT USE OF INSULIN (HCC): Primary | ICD-10-CM

## 2022-05-19 DIAGNOSIS — E11.649 TYPE 2 DIABETES MELLITUS WITH HYPOGLYCEMIA WITHOUT COMA, WITH LONG-TERM CURRENT USE OF INSULIN (HCC): ICD-10-CM

## 2022-05-19 DIAGNOSIS — Z79.4 TYPE 2 DIABETES MELLITUS WITH MICROALBUMINURIA, WITH LONG-TERM CURRENT USE OF INSULIN (HCC): ICD-10-CM

## 2022-05-19 DIAGNOSIS — N18.31 STAGE 3A CHRONIC KIDNEY DISEASE (HCC): ICD-10-CM

## 2022-05-19 DIAGNOSIS — I10 ESSENTIAL HYPERTENSION: ICD-10-CM

## 2022-05-19 PROCEDURE — 99214 OFFICE O/P EST MOD 30 MIN: CPT | Performed by: INTERNAL MEDICINE

## 2022-05-19 RX ORDER — INSULIN HUMAN 500 [IU]/ML
INJECTION, SOLUTION SUBCUTANEOUS
Qty: 36 ML | Refills: 3 | Status: SHIPPED | OUTPATIENT
Start: 2022-05-19

## 2022-05-19 NOTE — PROGRESS NOTES
Krystin Anne Marierodgernt 59 y o  female MRN: [id number]    Encounter: [id number]      Assessment/Plan     Problem List Items Addressed This Visit        Endocrine    Diabetes mellitus ([facility] [address] )    Relevant Medications    Insulin Regular Human, Conc, (HumuLIN R U-500 KwikPen) 500 units/mL CONCENTRATED U-500 injection pen    Other Relevant Orders    Comprehensive metabolic panel Lab Collect    HEMOGLOBIN A1C W/ EAG ESTIMATION Lab Collect    Microalbumin / creatinine urine ratio Lab Collect    Thyroid nodule     Stable on imaging-will monitor periodically           Type 2 diabetes mellitus with hyperglycemia, with long-term current use of insulin (HCC) - Primary       Lab Results   Component Value Date    HGBA1C 7 6 (A) 01/26/2022   Erratic blood sugars due to inconsistent carbohydrate intake at meals  For now given written instructions to take Humalog U 500 regular insulin 80 units before breakfast and 24 before dinner  She will check her blood sugars before breakfast before dinner and before bedtime    Will reach out to [facility] regarding test strips           Relevant Medications    Insulin Regular Human, Conc, (HumuLIN R U-500 KwikPen) 500 units/mL CONCENTRATED U-500 injection pen    Other Relevant Orders    Comprehensive metabolic panel Lab Collect    HEMOGLOBIN A1C W/ EAG ESTIMATION Lab Collect    Microalbumin / creatinine urine ratio Lab Collect    Type 2 diabetes mellitus with microalbuminuria, with long-term current use of insulin (HCC)    Relevant Medications    Insulin Regular Human, Conc, (HumuLIN R U-500 KwikPen) 500 units/mL CONCENTRATED U-500 injection pen       Cardiovascular and Mediastinum    Essential hypertension       Genitourinary    Stage 3a chronic kidney disease (HCC)       Other    Hyperlipidemia        CC: Diabetes    History of Present Illness     HPI:  22-year-old male with type 2 diabetes on insulin therapy seen in follow-up-diabetes is complicated by retinopathy, neuropathy microalbuminuria    Current regimen   Humulin U 500 regular insulin 80 units before breakfast and 30 before dinner   Metformin  She states that she has only given enough strips to be able to check fingersticks once a day only by "[employer]   Meter download reviewed-fasting sugars have ranged between 60s to 200s  HYPOGLYCEMIA  3 times last week in morning   Occasional polyuria , polydipsia ,c/o blurry vision ,+ numbness and tingling in feet  +weight gain     Prednisone was increased to 4 mg in march        Review of Systems    Historical Information   Past Medical History:   Diagnosis Date    Anemia     Arthritis     Diabetes mellitus ([facility] )     Dyspnea on exertion     Hyperlipidemia     Hypertension     Legally blind 2010    Mild intermittent asthma with exacerbation 8/4/2018    Miscarriage     x 3    Polymyalgia rheumatica ([facility] ) 11/24/2021    Seizures ([place] [address] )     Sickle cell trait ([place] [address] )     Sleep apnea     Stage 3a chronic kidney disease ([place] [address] ) 5/19/2022    Thyroid nodule 3/6/2020     Past Surgical History:   Procedure Laterality Date    CATARACT EXTRACTION Bilateral     august and september    EYE SURGERY      HYSTERECTOMY      39    OOPHORECTOMY Left     39    VA TEMPORAL ARTERY LIGATN OR BX Bilateral 10/17/2019    Procedure: BIOPSY ARTERY TEMPORAL;  Surgeon: Vernon Staff, DO;  [facility];  Service: Vascular    US GUIDED THYROID BIOPSY  5/13/2020     Social History   Social History     Substance and Sexual Activity   Alcohol Use Never    Alcohol/week: 0 0 standard drinks     Social History     Substance and Sexual Activity   Drug Use No     Social History     Tobacco Use   Smoking Status Never Smoker   Smokeless Tobacco Never Used     Family History:   Family History   Problem Relation Age of Onset    Heart attack Mother     Heart disease Mother     Hypertension Mother     No Known Problems Father     Heart disease Sister     No Known Problems Brother     No Known Problems Son     No Known Problems Daughter     Heart attack Maternal Grandmother     Heart disease Maternal Grandmother     Diabetes Other     Heart attack Other     No Known Problems Sister     No Known Problems Sister     No Known Problems Paternal Aunt     No Known Problems Son     Cancer Neg Hx     Stroke Neg Hx        Meds/Allergies   Current Outpatient Medications   Medication Sig Dispense Refill    albuterol (Proventil HFA) 90 mcg/act inhaler Inhale 2 puffs every 6 (six) hours as needed for wheezing For another 24 hours use every 4 hours standing 6 7 g 0    aspirin (ECOTRIN LOW STRENGTH) 81 mg EC tablet Take 81 mg by mouth daily       atorvastatin (LIPITOR) 40 mg tablet TAKE 1 TABLET BY MOUTH  DAILY 90 tablet 3    Blood Glucose Monitoring Suppl (OneTouch Verio) w/Device KIT Use 3 (three) times a day 1 kit 0    Blood Pressure Monitor KIT Check blood pressures BID 1 each 0    budesonide-formoterol (SYMBICORT) 80-4 5 MCG/ACT inhaler Inhale 2 puffs 2 (two) times a day Rinse mouth after use   1 Inhaler 5    Cholecalciferol (HM Vitamin D3) 100 MCG (4000 UT) CAPS 4000 units daily 60 capsule 5    Diclofenac Sodium POWD       diltiazem (CARDIZEM CD) 240 mg 24 hr capsule Take 2 capsules (480 mg total) by mouth daily 180 capsule 1    ferrous sulfate 324 (65 Fe) mg Take 1 tablet (324 mg total) by mouth every other day (Patient taking differently: Take 324 mg by mouth daily before breakfast) 45 tablet 3    furosemide (LASIX) 20 mg tablet Take 1 tablet (20 mg total) by mouth daily 90 tablet 3    gabapentin (NEURONTIN) 100 mg capsule Take 1 capsule in am and 2-3 capsules at night 90 capsule 3    Insulin Pen Needle (BD Pen Needle Tita U/F) 32G X 4 MM MISC Use 2 (two) times a day 200 each 1    Insulin Regular Human, Conc, (HumuLIN R U-500 KwikPen) 500 units/mL CONCENTRATED U-500 injection pen INJECT SUBCUTANEOUSLY 80   UNITS BEFORE BREAKFAST AND  24 UNITS BEFORE DINNER 36 mL 3    Lancets (OneTouch Delica Plus [id number]) MISC Use 1 each 3 (three) times a day 300 each 3    losartan (COZAAR) 100 MG tablet TAKE 1 TABLET BY MOUTH  DAILY 90 tablet 3    magnesium oxide (MAG-OX) 400 mg tablet magnesium oxide 400 mg (241 3 mg magnesium) tablet   TAKE 1 TABLET BY MOUTH EVERY DAY      metFORMIN (GLUCOPHAGE-XR) 500 mg 24 hr tablet TAKE 2 TABLETS BY MOUTH  TWICE DAILY WITH MEALS 360 tablet 3    predniSONE 1 mg tablet Combine to take 9 mg once daily  Decrease by 1 mg every 4 weeks  (Patient taking differently: Take 4 mg by mouth in the morning  Combine to take 9 mg once daily  Decrease by 1 mg every 4 weeks  Rox Ackerman ) 360 tablet 1    Riboflavin 400 MG TABS Take 1 tablet (400 mg total) by mouth daily 30 tablet 0    topiramate (TOPAMAX) 100 mg tablet TAKE 1 TABLET BY MOUTH AT  BEDTIME 90 tablet 3    urea 40 % LOTN lotion urea 40 % lotion   APPLY TO THE AFFECTED AREA(S) BY TOPICAL ROUTE 2 TIMES PER DAY       No current facility-administered medications for this visit  No Known Allergies    Objective   Vitals: Blood pressure 138/70, pulse 94, height 5' 8" (1 727 m), weight (!) 152 kg (334 lb 3 2 oz), not currently breastfeeding  Physical Exam  Vitals reviewed  Constitutional:       Appearance: Normal appearance  She is obese  She is not ill-appearing or diaphoretic  HENT:      Head: Normocephalic and atraumatic  Eyes:      General: No scleral icterus  Extraocular Movements: Extraocular movements intact  Cardiovascular:      Rate and Rhythm: Normal rate and regular rhythm  Heart sounds: Normal heart sounds  No murmur heard  Pulmonary:      Effort: Pulmonary effort is normal  No respiratory distress  Breath sounds: Normal breath sounds  No wheezing  Abdominal:      General: There is no distension  Palpations: Abdomen is soft  Tenderness: There is no abdominal tenderness  There is no guarding  Musculoskeletal:      Cervical back: Neck supple  Right lower leg: No edema        Left lower leg: No edema    Lymphadenopathy:      Cervical: No cervical adenopathy  Skin:     General: Skin is warm and dry  Neurological:      General: No focal deficit present  Mental Status: She is alert and oriented to person, place, and time  Psychiatric:         Mood and Affect: Mood normal          Behavior: Behavior normal          Thought Content: Thought content normal          Judgment: Judgment normal          The history was obtained from the review of the chart, patient  Lab Results:   Lab Results   Component Value Date/Time    Hemoglobin A1C 7 6 (A) 01/26/2022 11:03 AM    Hemoglobin A1C 7 9 01/19/2022 12:00 AM    Hemoglobin A1C 8 2 (H) 07/23/2021 09:13 AM    BUN 23 07/23/2021 09:13 AM    Potassium 3 5 (L) 07/23/2021 09:13 AM    Chloride 103 07/23/2021 09:13 AM    CO2 32 (H) 07/23/2021 09:13 AM    Creatinine 1 04 07/23/2021 09:13 AM       Portions of the record may have been created with voice recognition software  Occasional wrong word or "sound a like" substitutions may have occurred due to the inherent limitations of voice recognition software  Read the chart carefully and recognize, using context, where substitutions have occurred

## 2022-05-20 NOTE — ASSESSMENT & PLAN NOTE
Lab Results   Component Value Date    HGBA1C 7 6 (A) 01/26/2022   Erratic blood sugars due to inconsistent carbohydrate intake at meals  For now given written instructions to take Humalog U 500 regular insulin 80 units before breakfast and 24 before dinner  She will check her blood sugars before breakfast before dinner and before bedtime    Will reach out to [employer] regarding test strips

## 2022-05-24 ENCOUNTER — TELEPHONE (OUTPATIENT)
Dept: ENDOCRINOLOGY | Facility: CLINIC | Age: 62
End: 2022-05-24

## 2022-05-24 NOTE — TELEPHONE ENCOUNTER
----- Message from Babs Ahumada, MD sent at 5/20/2022  8:20 AM EDT -----  Can you check her insurance would cover jerilyn Damian? Is she agreeable to using one?

## 2022-05-24 NOTE — TELEPHONE ENCOUNTER
Lvm to inquiring information regarding coverage for the placement and interpretation of the Batres      Procedure Codes: 23962 and 32711

## 2022-06-09 ENCOUNTER — TELEPHONE (OUTPATIENT)
Dept: ENDOCRINOLOGY | Facility: CLINIC | Age: 62
End: 2022-06-09

## 2022-06-09 ENCOUNTER — OFFICE VISIT (OUTPATIENT)
Dept: NEUROLOGY | Facility: CLINIC | Age: 62
End: 2022-06-09
Payer: MEDICARE

## 2022-06-09 VITALS
SYSTOLIC BLOOD PRESSURE: 149 MMHG | DIASTOLIC BLOOD PRESSURE: 70 MMHG | BODY MASS INDEX: 44.41 KG/M2 | HEIGHT: 68 IN | HEART RATE: 89 BPM | WEIGHT: 293 LBS | TEMPERATURE: 98.1 F

## 2022-06-09 DIAGNOSIS — E11.65 TYPE 2 DIABETES MELLITUS WITH HYPERGLYCEMIA, WITH LONG-TERM CURRENT USE OF INSULIN (HCC): Primary | ICD-10-CM

## 2022-06-09 DIAGNOSIS — E11.40 DIABETIC NEUROPATHY (HCC): ICD-10-CM

## 2022-06-09 DIAGNOSIS — G47.33 OBSTRUCTIVE SLEEP APNEA: ICD-10-CM

## 2022-06-09 DIAGNOSIS — I10 ESSENTIAL HYPERTENSION: ICD-10-CM

## 2022-06-09 DIAGNOSIS — M35.3 POLYMYALGIA RHEUMATICA (HCC): ICD-10-CM

## 2022-06-09 DIAGNOSIS — Z79.4 TYPE 2 DIABETES MELLITUS WITH HYPERGLYCEMIA, WITH LONG-TERM CURRENT USE OF INSULIN (HCC): Primary | ICD-10-CM

## 2022-06-09 DIAGNOSIS — G43.009 MIGRAINE WITHOUT AURA AND WITHOUT STATUS MIGRAINOSUS, NOT INTRACTABLE: Primary | ICD-10-CM

## 2022-06-09 DIAGNOSIS — I67.1 CEREBRAL ANEURYSM WITHOUT RUPTURE: ICD-10-CM

## 2022-06-09 DIAGNOSIS — R26.81 UNSTEADINESS ON FEET: ICD-10-CM

## 2022-06-09 DIAGNOSIS — H54.3 VISUAL IMPAIRMENT IN BOTH EYES: ICD-10-CM

## 2022-06-09 PROBLEM — G43.709 CHRONIC MIGRAINE WITHOUT AURA, NOT INTRACTABLE, WITHOUT STATUS MIGRAINOSUS: Status: ACTIVE | Noted: 2022-06-09

## 2022-06-09 PROCEDURE — 99215 OFFICE O/P EST HI 40 MIN: CPT | Performed by: PHYSICIAN ASSISTANT

## 2022-06-09 NOTE — PROGRESS NOTES
Patient ID: Debbie Vaughn is a 59 y o  female  Assessment/Plan:     Diagnoses and all orders for this visit:    Migraine without aura and without status migrainosus, not intractable  -     Ambulatory Referral to Neurology    Unsteadiness on feet  -     Ambulatory Referral to Neurology    Polymyalgia rheumatica ([facility] [address] )  -     Ambulatory Referral to Neurology    Cerebral aneurysm without rupture    Obstructive sleep apnea    Visual impairment in both eyes    Essential hypertension    Diabetic neuropathy ([facility] [address] )         Notably pt's a1c increased to 10 3% recently, but she did increase prednisone back up at that time  I am not sure if this is a correlate  Regardless she will continue to attempt to wean down with help from rheumatology  She is planning to attend the gym soon for exercise which will assist with lowering a1c  Continue using a cane at all times  Fall precautions discussed  Continue with regular ophthalmology visits, no glaucoma noted  Okay to continue topamax which has reduced migraine HAs  F/u with Neurosurgery re: cerrebral aneurysms  Discussed importance of maintianing BP </=130/80 on average  -- Stable 3 to 4 mm left mid cavernous and cavernous cave ICA aneurysms  The patient should not hesitate to call me prior to her follow up with any questions or concerns  Subjective:    HPI     Ms  Debbie Vaughn is here for f/u  There has been no progression of LE neuropathy since last seen  She has regular foot checks by "[facility]" where she resides  She tries to elevate the legs and rest during the day when needed, due to some swelling  States more frequent HAs lately when she had to go to [employer] activities  She finds it boring slightly and does not like to go  These are stress/ tension headaches and she denies migraines/ has very infrequent migraines since starting topamax  Continues to follow up with ophtho and no h/o glaucoma      Regarding prednisone for PMR, and continues to see rheumatology, she went down to 2mg and had too much pain, so then went back up to 4 mg  Planning to potentially go back down to 3mg soon  In may it appears her a1c went up to 10 3%, and this was shortly after increasing the prednisone back up per her history  She feels some swelling in the hands recently, and overall increased inflammation  She is trying to stay active, getting up at home and moving around more, trying to watch what she eats  She uses a cane for ambulation and feels very comfortable with that  Denies falls  Her poor vision sometimes makes going up and down the stairs challenging  She is thinking about going to the gym to work out; thinks it would reduce pain and inflammation overall  The following portions of the patient's history were reviewed and updated as appropriate:   She  has a past medical history of Anemia, Arthritis, Diabetes mellitus ([facility] [address] ), Dyspnea on exertion, Hyperlipidemia, Hypertension, Legally blind (2010), Mild intermittent asthma with exacerbation (8/4/2018), Miscarriage, Polymyalgia rheumatica ([facility] ) (11/24/2021), Seizures ([facility] ), Sickle cell trait ([facility] ), Sleep apnea, Stage 3a chronic kidney disease ([facility] ) (5/19/2022), and Thyroid nodule (3/6/2020)    She   Patient Active Problem List    Diagnosis Date Noted    Unsteadiness on feet 06/29/2022    Chronic migraine without aura, not intractable, without status migrainosus 06/09/2022    Obstructive sleep apnea 06/09/2022    Diabetic neuropathy ([facility] [address] ) 06/09/2022    Stage 3a chronic kidney disease ([facility] [address] ) 05/19/2022    [facility] ) 11/24/2021    Gait instability 11/24/2021    Ulnar neuropathy of right upper extremity     Blurry vision, bilateral 03/16/2021    Depressed mood 10/08/2020    Essential hypertension 09/15/2020    Diabetes mellitus ([address] ) 09/08/2020    Chronic migraine without aura without status migrainosus, not intractable 04/22/2020    Hypersomnia 04/06/2020    Migraine without aura and without status migrainosus, not intractable 04/06/2020    Cerebral aneurysm without rupture 04/06/2020    History of absence seizures 03/25/2020    Thyroid nodule 03/06/2020    [facility]neurysm ([facility] ) 03/05/2020    Type 2 diabetes mellitus with hyperglycemia, with long-term current use of insulin ([facility] ) 02/21/2020    Left cavernous carotid aneurysm 02/10/2020    Polyneuropathy associated with underlying disease ([facility] ) 01/07/2020    PMR (polymyalgia rheumatica) ([facility] [address] ) 01/07/2020    Thrombocytosis 01/07/2020    Morbid obesity ([facility] ) 09/19/2019    Other inflammatory and immune myopathies, not elsewhere classified 09/16/2019    Transaminitis 09/16/2019    Frequent headaches 07/09/2019    Type 2 diabetes mellitus with microalbuminuria, with long-term current use of insulin ([facility] ) 02/01/2019    Mild intermittent asthma with exacerbation 08/04/2018    Orthostatic hypotension 06/25/2018    Lung nodules 03/22/2018    Exertional dyspnea 02/13/2018    Enlarged pulmonary artery ([facility] [address] ) 02/13/2018    Aortic dilatation ([facility] ) 02/13/2018    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, with long-term current use of insulin     Benign hypertension     Seizures ([facility] [address] )     Obstructive sleep apnea (adult) (pediatric) 12/18/2017    Prolonged QT interval 12/18/2017    Decreased pulses in feet 12/11/2017    Diabetes mellitus type 2 with complications, uncontrolled 09/08/2015    Microalbuminuria 09/08/2015    Vitamin D deficiency 06/06/2014    Visual impairment in both eyes 12/06/2012    Anemia 03/20/2012    Hyperlipidemia 03/20/2012    Class 3 severe obesity with serious comorbidity and body mass index (BMI) of 50 0 to 59 9 in adult [facility]) 03/20/2012    Neuropathy of hand, right 03/20/2012     She  has a past surgical history that includes Cataract extraction (Bilateral); Eye surgery; Hysterectomy;  Oophorectomy (Left); pr temporal artery ligatn or bx (Bilateral, 10/17/2019); and US guided thyroid biopsy (5/13/2020)  Her family history includes Diabetes in her other; Heart attack in her maternal grandmother, mother, and other; Heart disease in her maternal grandmother, mother, and sister; Hypertension in her mother; No Known Problems in her brother, daughter, father, paternal aunt, sister, sister, son, and son  She  reports that she has never smoked  She has never used smokeless tobacco  She reports that she does not drink alcohol and does not use drugs  Current Outpatient Medications   Medication Sig Dispense Refill    albuterol (Proventil HFA) 90 mcg/act inhaler Inhale 2 puffs every 6 (six) hours as needed for wheezing For another 24 hours use every 4 hours standing 6 7 g 0    aspirin (ECOTRIN LOW STRENGTH) 81 mg EC tablet Take 81 mg by mouth daily       atorvastatin (LIPITOR) 40 mg tablet TAKE 1 TABLET BY MOUTH  DAILY 90 tablet 3    Blood Glucose Monitoring Suppl (OneTouch Verio) w/Device KIT Use 3 (three) times a day 1 kit 0    Blood Pressure Monitor KIT Check blood pressures BID 1 each 0    budesonide-formoterol (SYMBICORT) 80-4 5 MCG/ACT inhaler Inhale 2 puffs 2 (two) times a day Rinse mouth after use   1 Inhaler 5    Cholecalciferol (HM Vitamin D3) 100 MCG (4000 UT) CAPS 4000 units daily 60 capsule 5    Diclofenac Sodium POWD       diltiazem (CARDIZEM CD) 240 mg 24 hr capsule Take 2 capsules (480 mg total) by mouth daily 180 capsule 1    ferrous sulfate 324 (65 Fe) mg Take 1 tablet (324 mg total) by mouth every other day (Patient taking differently: Take 324 mg by mouth daily before breakfast) 45 tablet 3    furosemide (LASIX) 20 mg tablet Take 1 tablet (20 mg total) by mouth daily 90 tablet 3    gabapentin (NEURONTIN) 100 mg capsule Take 1 capsule in am and 2-3 capsules at night 90 capsule 3    Insulin Pen Needle (BD Pen Needle Tita U/F) 32G X 4 MM MISC Use 2 (two) times a day 200 each 1    Insulin Regular Human, Conc, (HumuLIN R U-500 KwikPen) 500 units/mL CONCENTRATED U-500 injection pen INJECT SUBCUTANEOUSLY 80   UNITS BEFORE BREAKFAST AND  24 UNITS BEFORE DINNER 36 mL 3    Lancets (OneTouch Delica Plus KLAGER83I) MISC Use 1 each 3 (three) times a day 300 each 3    losartan (COZAAR) 100 MG tablet TAKE 1 TABLET BY MOUTH  DAILY 90 tablet 3    magnesium oxide (MAG-OX) 400 mg tablet magnesium oxide 400 mg (241 3 mg magnesium) tablet   TAKE 1 TABLET BY MOUTH EVERY DAY      metFORMIN (GLUCOPHAGE-XR) 500 mg 24 hr tablet TAKE 2 TABLETS BY MOUTH  TWICE DAILY WITH MEALS 360 tablet 3    predniSONE 1 mg tablet Combine to take 9 mg once daily  Decrease by 1 mg every 4 weeks  (Patient taking differently: Take 4 mg by mouth daily Combine to take 9 mg once daily  Decrease by 1 mg every 4 weeks ) 360 tablet 1    Riboflavin 400 MG TABS Take 1 tablet (400 mg total) by mouth daily 30 tablet 0    topiramate (TOPAMAX) 100 mg tablet TAKE 1 TABLET BY MOUTH AT  BEDTIME 90 tablet 3    urea 40 % LOTN lotion urea 40 % lotion   APPLY TO THE AFFECTED AREA(S) BY TOPICAL ROUTE 2 TIMES PER DAY       No current facility-administered medications for this visit  She has No Known Allergies            Objective:    Blood pressure 149/70, pulse 89, temperature 98 1 °F (36 7 °C), temperature source Temporal, height 5' 8" (1 727 m), weight (!) 149 kg (328 lb 11 2 oz), not currently breastfeeding  Body mass index is 49 98 kg/m²  Physical Exam    Neurological Exam  On neurologic exam, the patient is alert and oriented to time and place  Speech is fluent and articulate, and the patient follows commands appropriately  Judgment and affect appear normal  Pupils are equally round and reactive to light and extraocular muscles are intact without nystagmus  Face is symmetric, and tongue, uvula, and palate are midline  Hearing is intact  Motor examination reveals intact strength throughout  Normal gait is wb, and she uses a cane  She is slow to stand from the chair due to low back pain/ stiffness      ROS:    Review of Systems Constitutional: Negative  Negative for appetite change and fever  HENT: Negative  Negative for hearing loss, tinnitus, trouble swallowing and voice change  Eyes: Negative  Negative for photophobia and pain  Respiratory: Negative  Negative for shortness of breath  Cardiovascular: Negative  Negative for palpitations  Gastrointestinal: Negative  Negative for nausea and vomiting  Endocrine: Negative  Negative for cold intolerance  Genitourinary: Negative  Negative for dysuria, frequency and urgency  Musculoskeletal: Negative  Negative for myalgias and neck pain  Skin: Negative  Negative for rash  Neurological: Negative  Negative for dizziness, tremors, seizures, syncope, facial asymmetry, speech difficulty, weakness, light-headedness, numbness and headaches  Hematological: Negative  Does not bruise/bleed easily  Psychiatric/Behavioral: Negative  Negative for confusion, hallucinations and sleep disturbance  The following portions of the patient's history were reviewed and updated as appropriate: allergies, current medications/ medication history, past family history, past medical history, past social history, past surgical history and problem list     Review of systems was reviewed and otherwise unremarkable from a neurological perspective  I have spent 40 minutes with the patient today in which greater than 50% of this time was spent in counseling/coordination of care regarding diagnoses, test results, impression, plan and potential medication side effects

## 2022-06-29 PROBLEM — R26.81 UNSTEADINESS ON FEET: Status: ACTIVE | Noted: 2022-06-29

## 2022-07-18 ENCOUNTER — TELEPHONE (OUTPATIENT)
Dept: ENDOCRINOLOGY | Facility: CLINIC | Age: 62
End: 2022-07-18

## 2022-07-18 NOTE — TELEPHONE ENCOUNTER
Mornings are usually in good range-before dinner and bedtime can be sometimes high or  low  Likely due to inconsistent carbohydrate intake at meals    For now continue current regimen and follow-up with the nutritionist

## 2022-08-25 ENCOUNTER — OFFICE VISIT (OUTPATIENT)
Dept: ENDOCRINOLOGY | Facility: CLINIC | Age: 62
End: 2022-08-25
Payer: MEDICARE

## 2022-08-25 VITALS
HEART RATE: 90 BPM | WEIGHT: 293 LBS | DIASTOLIC BLOOD PRESSURE: 80 MMHG | SYSTOLIC BLOOD PRESSURE: 130 MMHG | BODY MASS INDEX: 44.41 KG/M2 | HEIGHT: 68 IN

## 2022-08-25 DIAGNOSIS — E11.65 TYPE 2 DIABETES MELLITUS WITH HYPERGLYCEMIA, WITH LONG-TERM CURRENT USE OF INSULIN (HCC): Primary | ICD-10-CM

## 2022-08-25 DIAGNOSIS — Z79.4 TYPE 2 DIABETES MELLITUS WITH HYPERGLYCEMIA, WITH LONG-TERM CURRENT USE OF INSULIN (HCC): Primary | ICD-10-CM

## 2022-08-25 LAB — SL AMB POCT HEMOGLOBIN AIC: 8.4 (ref ?–6.5)

## 2022-08-25 PROCEDURE — 83036 HEMOGLOBIN GLYCOSYLATED A1C: CPT | Performed by: PHYSICIAN ASSISTANT

## 2022-08-25 PROCEDURE — 99214 OFFICE O/P EST MOD 30 MIN: CPT | Performed by: PHYSICIAN ASSISTANT

## 2022-08-25 RX ORDER — INSULIN HUMAN 500 [IU]/ML
INJECTION, SOLUTION SUBCUTANEOUS
Qty: 36 ML | Refills: 3 | Status: SHIPPED | OUTPATIENT
Start: 2022-08-25

## 2022-08-25 NOTE — PROGRESS NOTES
Established Patient Progress Note      Chief Complaint   Patient presents with    Diabetes Type 2        History of Present Illness:   Francia Perdue is a 59 y o  female with a history of type 2 diabetes with long term use of insulin since many years ago  Reports complications of retinopathy, neuropathy, and microalbuminuria  Denies recent illness or hospitalizations  Denies recent severe hypoglycemic or severe hyperglycemic episodes  Denies any issues with her current regimen  home glucose monitoring: are performed regularly about 2x per day and average glucose 176  Glucometer reviewed and blood sugars rangefrom 57 to 185  She is reporting overnight hypoglycemia which can occur multiple times per week  Currently taking Prednisone 4mg daily, became immoble on 2mg dose due to severe aches/pains  Lost 16 pounds since last visit  Wants to join a gym  Also would like to see a dietician, will need to see dietician through [employer]        Current regimen:   Metformin ER 500mg- 2 tabs twice per day   Humulin U500- 80 before breakfast and 24 units before dinner    Last Eye Exam: UTD  Last Foot Exam: UTD    Has hypertension: Taking diltiazem  Has hyperlipidemia: Taking atorvastatin    Thyroid disorders: hx thyroid nodules, stable on 6/2021 ultrasound  Hx prior FNA showed no malignancy    Patient Active Problem List   Diagnosis    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, with long-term current use of insulin    Benign hypertension    Seizures (HCC)    Exertional dyspnea    Enlarged pulmonary artery (HCC)    Aortic dilatation (HCC)    Anemia    Decreased pulses in feet    Diabetes mellitus type 2 with complications, uncontrolled    Hyperlipidemia    Microalbuminuria    Obstructive sleep apnea (adult) (pediatric)    Class 3 severe obesity with serious comorbidity and body mass index (BMI) of 50 0 to 59 9 in adult (HCC)    Neuropathy of hand, right    Prolonged QT interval  Visual impairment in both eyes    Vitamin D deficiency    Lung nodules    Orthostatic hypotension    Mild intermittent asthma with exacerbation    Type 2 diabetes mellitus with microalbuminuria, with long-term current use of insulin ([facility])    Frequent headaches    Other inflammatory and immune myopathies, not elsewhere classified    Transaminitis    Morbid obesity [facility] [address] [facility]    Polyneuropathy associated with underlying disease ([facility] [address] [facility]    PMR (polymyalgia rheumatica) ([facility])    Thrombocytosis    Left cavernous carotid aneurysm    Type 2 diabetes mellitus with hyperglycemia, with long-term current use of insulin ([facility])    Aneurysm ([facility])    Thyroid nodule    History of absence seizures    Hypersomnia    Migraine without aura and without status migrainosus, not intractable    Cerebral aneurysm without rupture    Chronic migraine without aura without status migrainosus, not intractable    Diabetes mellitus ([address] )    Essential hypertension    Depressed mood    Blurry vision, bilateral    Ulnar neuropathy of right upper extremity    Polymyalgia rheumatica ([facility])    Gait instability    Stage 3a chronic kidney disease ([facility])    Chronic migraine without aura, not intractable, without status migrainosus    Obstructive sleep apnea    Diabetic neuropathy ([facility])    Unsteadiness on feet      Past Medical History:   Diagnosis Date    Anemia     Arthritis     Diabetes mellitus ([address]     Dyspnea on exertion     Hyperlipidemia     Hypertension     Legally blind 2010    Mild intermittent asthma with exacerbation 8/4/2018    Miscarriage     x 3    Polymyalgia rheumatica ([address] ) 11/24/2021    Seizures ([address] )     Sickle cell trait ([address] )     Sleep apnea     Stage 3a chronic kidney disease ([facility] [address] ) 5/19/2022    Thyroid nodule 3/6/2020      Past Surgical History:   Procedure Laterality Date    CATARACT EXTRACTION Bilateral     august and september    EYE SURGERY      HYSTERECTOMY      39    OOPHORECTOMY Left     39    MO TEMPORAL ARTERY LIGATN OR BX Bilateral 10/17/2019    Procedure: BIOPSY ARTERY TEMPORAL;  Surgeon: James Madsen DO;  [facility]: [facility];  Service: Vascular    US GUIDED THYROID BIOPSY  5/13/2020      Family History   Problem Relation Age of Onset    Heart attack Mother     Heart disease Mother     Hypertension Mother     No Known Problems Father     Heart disease Sister     No Known Problems Brother     No Known Problems Son     No Known Problems Daughter     Heart attack Maternal Grandmother     Heart disease Maternal Grandmother     Diabetes Other     Heart attack Other     No Known Problems Sister     No Known Problems Sister     No Known Problems Paternal Aunt     No Known Problems Son     Cancer Neg Hx     Stroke Neg Hx      Social History     Tobacco Use    Smoking status: Never Smoker    Smokeless tobacco: Never Used   Substance Use Topics    Alcohol use: Never     Alcohol/week: 0 0 standard drinks     No Known Allergies      Current Outpatient Medications:     albuterol (Proventil HFA) 90 mcg/act inhaler, Inhale 2 puffs every 6 (six) hours as needed for wheezing For another 24 hours use every 4 hours standing, Disp: 6 7 g, Rfl: 0    aspirin (ECOTRIN LOW STRENGTH) 81 mg EC tablet, Take 81 mg by mouth daily , Disp: , Rfl:     atorvastatin (LIPITOR) 40 mg tablet, TAKE 1 TABLET BY MOUTH  DAILY, Disp: 90 tablet, Rfl: 3    Blood Pressure Monitor KIT, Check blood pressures BID, Disp: 1 each, Rfl: 0    budesonide-formoterol (SYMBICORT) 80-4 5 MCG/ACT inhaler, Inhale 2 puffs 2 (two) times a day Rinse mouth after use , Disp: 1 Inhaler, Rfl: 5    Cholecalciferol (HM Vitamin D3) 100 MCG (4000 UT) CAPS, 4000 units daily, Disp: 60 capsule, Rfl: 5    Diclofenac Sodium POWD, , Disp: , Rfl:     diltiazem (CARDIZEM CD) 240 mg 24 hr capsule, Take 2 capsules (480 mg total) by mouth daily, Disp: 180 capsule, Rfl: 1    ferrous sulfate 324 (65 Fe) mg, Take 1 tablet (324 mg total) by mouth every other day (Patient taking differently: Take 324 mg by mouth daily before breakfast), Disp: 45 tablet, Rfl: 3    furosemide (LASIX) 20 mg tablet, Take 1 tablet (20 mg total) by mouth daily, Disp: 90 tablet, Rfl: 3    gabapentin (NEURONTIN) 100 mg capsule, Take 1 capsule in am and 2-3 capsules at night, Disp: 90 capsule, Rfl: 3    Insulin Pen Needle (BD Pen Needle Tita U/F) 32G X 4 MM MISC, Use 2 (two) times a day, Disp: 200 each, Rfl: 1    Insulin Regular Human, Conc, (HumuLIN R U-500 KwikPen) 500 units/mL CONCENTRATED U-500 injection pen, INJECT SUBCUTANEOUSLY 80   UNITS BEFORE BREAKFAST AND  20 UNITS BEFORE DINNER, Disp: 36 mL, Rfl: 3    losartan (COZAAR) 100 MG tablet, TAKE 1 TABLET BY MOUTH  DAILY, Disp: 90 tablet, Rfl: 3    magnesium oxide (MAG-OX) 400 mg tablet, magnesium oxide 400 mg (241 3 mg magnesium) tablet  TAKE 1 TABLET BY MOUTH EVERY DAY, Disp: , Rfl:     metFORMIN (GLUCOPHAGE-XR) 500 mg 24 hr tablet, TAKE 2 TABLETS BY MOUTH  TWICE DAILY WITH MEALS, Disp: 360 tablet, Rfl: 3    Riboflavin 400 MG TABS, Take 1 tablet (400 mg total) by mouth daily, Disp: 30 tablet, Rfl: 0    topiramate (TOPAMAX) 100 mg tablet, TAKE 1 TABLET BY MOUTH AT  BEDTIME, Disp: 90 tablet, Rfl: 3    urea 40 % LOTN lotion, urea 40 % lotion  APPLY TO THE AFFECTED AREA(S) BY TOPICAL ROUTE 2 TIMES PER DAY, Disp: , Rfl:     Blood Glucose Monitoring Suppl (OneTouch Verio) w/Device KIT, Use 3 (three) times a day (Patient not taking: Reported on 8/25/2022), Disp: 1 kit, Rfl: 0    Lancets (OneTouch Delica Plus [id number]) MISC, Use 1 each 3 (three) times a day (Patient not taking: Reported on 8/25/2022), Disp: 300 each, Rfl: 3    predniSONE 1 mg tablet, Combine to take 9 mg once daily  Decrease by 1 mg every 4 weeks   (Patient not taking: Reported on 8/25/2022), Disp: 360 tablet, Rfl: 1    Review of Systems   Constitutional: Negative for activity change, appetite change, chills, diaphoresis, fatigue, fever and unexpected weight change  HENT: Negative for trouble swallowing and voice change  Eyes: Negative for visual disturbance  Respiratory: Negative for shortness of breath  Cardiovascular: Negative for chest pain and palpitations  Gastrointestinal: Negative for abdominal pain, constipation and diarrhea  Endocrine: Negative for cold intolerance, heat intolerance, polydipsia, polyphagia and polyuria  Genitourinary: Negative for frequency and menstrual problem  Musculoskeletal: Positive for arthralgias and gait problem  Negative for myalgias  Skin: Negative for rash  Allergic/Immunologic: Negative for food allergies  Neurological: Negative for dizziness and tremors  Hematological: Negative for adenopathy  Psychiatric/Behavioral: Negative for sleep disturbance  All other systems reviewed and are negative  Physical Exam:  Body mass index is 48 35 kg/m²  /80   Pulse 90   Ht 5' 8" (1 727 m)   Wt (!) 144 kg (318 lb)   BMI 48 35 kg/m²    Wt Readings from Last 3 Encounters:   08/25/22 (!) 144 kg (318 lb)   06/09/22 (!) 149 kg (328 lb 11 2 oz)   05/19/22 (!) 152 kg (334 lb 3 2 oz)       Physical Exam  Vitals reviewed  Constitutional:       General: She is not in acute distress  Appearance: She is well-developed  HENT:      Head: Normocephalic and atraumatic  Eyes:      Conjunctiva/sclera: Conjunctivae normal       Pupils: Pupils are equal, round, and reactive to light  Neck:      Thyroid: No thyromegaly  Cardiovascular:      Rate and Rhythm: Normal rate and regular rhythm  Heart sounds: Normal heart sounds  Pulmonary:      Effort: Pulmonary effort is normal  No respiratory distress  Breath sounds: Normal breath sounds  No wheezing or rales  Abdominal:      General: Bowel sounds are normal  There is no distension  Palpations: Abdomen is soft  Tenderness: There is no abdominal tenderness  Musculoskeletal:         General: Normal range of motion  Cervical back: Normal range of motion and neck supple  Skin:     General: Skin is warm and dry  Neurological:      Mental Status: She is alert and oriented to person, place, and time  Labs:   Lab Results   Component Value Date    HGBA1C 8 4 (A) 08/25/2022    HGBA1C 10 3 05/02/2022    HGBA1C 7 6 (A) 01/26/2022     Lab Results   Component Value Date    CREATININE 1 04 07/23/2021    CREATININE 1 04 05/07/2021    CREATININE 1 26 (H) 03/10/2021    BUN 23 07/23/2021     01/04/2016    K 3 5 (L) 07/23/2021     07/23/2021    CO2 32 (H) 07/23/2021     eGFR   Date Value Ref Range Status   07/23/2021 58 (L) >60 ml/min/1 73sq m Final     Lab Results   Component Value Date    CHOL 130 10/08/2015    HDL 45 05/07/2021    TRIG 115 05/07/2021     Lab Results   Component Value Date    ALT 18 05/07/2021    AST 18 05/07/2021    ALKPHOS 121 05/07/2021    BILITOT 0 69 01/04/2016     Lab Results   Component Value Date    VSS0LGQOOTEQ 0 737 03/10/2021    HDX8VHRRRPXA 1 820 05/04/2020    ELJ1VZDOCSKZ 0 736 12/06/2019     Lab Results   Component Value Date    FREET4 0 88 03/10/2021       Impression & Plan:    Problem List Items Addressed This Visit        Endocrine    Type 2 diabetes mellitus with hyperglycemia, with long-term current use of insulin ([address] ) - Primary     Diabetes is poorly controlled  She is reporting frequent symptoms of hypoglycemia overnight, which is not documented in glucometer, and the bedtime readings are often elevated  For now, Will reduce evening dose of Humulin R U500 from 25 to 20 units daily  Discussed importance of consistent carb intake  Refer for Diagnostic Dexcom  CGM  May need to return to basal bolus regimen     Lab Results   Component Value Date    HGBA1C 8 4 (A) 08/25/2022            Relevant Medications    Insulin Regular Human, Conc, (HumuLIN R U-500 KwikPen) 500 units/mL CONCENTRATED U-500 injection pen    Other Relevant Orders    POCT hemoglobin A1c (Completed) Ambulatory referral to Diabetic Education    Ambulatory referral to Diabetic Education    Hemoglobin A1C    Microalbumin / creatinine urine ratio    Basic metabolic panel          Orders Placed This Encounter   Procedures    Hemoglobin A1C     Standing Status:   Future     Standing Expiration Date:   8/25/2023    Microalbumin / creatinine urine ratio     Standing Status:   Future     Standing Expiration Date:   8/25/2023    Basic metabolic panel     This is a patient instruction: Patient fasting for 8 hours or longer recommended  Standing Status:   Future     Standing Expiration Date:   8/25/2023    Ambulatory referral to Diabetic Education     Standing Status:   Future     Standing Expiration Date:   8/25/2023     Referral Priority:   Routine     Referral Type:   Consult - AMB     Referral Reason:   Specialty Services Required     Requested Specialty:   Diabetes Services     Number of Visits Requested:   1     Expiration Date:   8/25/2023    Ambulatory referral to Diabetic Education     Standing Status:   Future     Standing Expiration Date:   8/25/2023     Referral Priority:   Routine     Referral Type:   Consult - AMB     Referral Reason:   Specialty Services Required     Requested Specialty:   Diabetes Services     Number of Visits Requested:   1     Expiration Date:   8/25/2023    POCT hemoglobin A1c       Patient Instructions   Reduce Dinner dose of insulin to 20 units    Keep up the good work with diet and exercise     Schedule appt with dietician and diagnostic Dexcom/continuous glucose monitor that will help determine blood sugar patterns and hypoglycemia  Discussed with the patient and all questioned fully answered  She will call me if any problems arise  Follow-up appointment in 3 months       Counseled patient on diagnostic results, prognosis, risk and benefit of treatment options, instruction for management, importance of treatment compliance, Risk  factor reduction and impressions    Thor Bush PA-C

## 2022-08-25 NOTE — PATIENT INSTRUCTIONS
Reduce Dinner dose of insulin to 20 units    Keep up the good work with diet and exercise     Schedule appt with dietician and diagnostic Dexcom/continuous glucose monitor that will help determine blood sugar patterns and hypoglycemia

## 2022-09-04 NOTE — ASSESSMENT & PLAN NOTE
Diabetes is poorly controlled  She is reporting frequent symptoms of hypoglycemia overnight, which is not documented in glucometer, and the bedtime readings are often elevated  For now, Will reduce evening dose of Humulin R U500 from 25 to 20 units daily  Discussed importance of consistent carb intake  Refer for Diagnostic Dexcom  CGM  May need to return to basal bolus regimen     Lab Results   Component Value Date    HGBA1C 8 4 (A) 08/25/2022

## 2022-09-22 ENCOUNTER — HOSPITAL ENCOUNTER (OUTPATIENT)
Dept: MAMMOGRAPHY | Facility: CLINIC | Age: 62
End: 2022-09-22
Payer: MEDICARE

## 2022-09-22 DIAGNOSIS — N63.0 BREAST NODULE: ICD-10-CM

## 2022-09-22 PROCEDURE — 76642 ULTRASOUND BREAST LIMITED: CPT

## 2022-09-25 NOTE — PROGRESS NOTES
Daily Note     Today's date: 2018  Patient name: Catherine Miguel  : 1960  MRN: [id number]  Referring provider: Renetta Blanco MD  Dx:   Encounter Diagnosis     ICD-10-CM    1  Gait instability R26 81                   Subjective: Patient reports continued "lightheadedness and dizziness", noting these symptoms are almost constant  Objective: See treatment diary below  Pre-workout: /88 mmHg, HR 83 bpm, SaO2 97%  Post-workout /88    Assessment: Held bike today, due to elevated systolic BP  Tolerated program well and without complaints  Good challenge with Tandem stance exercises, as well as with standing TB exercises  Required seated rest break for 1-2 minutes during standing exercises  Continue and progress as able  Plan: Per plan of care with emphasis on proximal hip strength and balance, as tolerated by patient       Precautions: Asthma, visual impairment, DM, peripheral neuropathy     Daily Treatment Diary      Manual                        [facility]                                                                                                                           Exercise Diary                      Bike  8'  np                   Standing head turns --> walking    @ bar 6x                   Tandem stance  2x 30" R,L 30"x2 ea                   SLS  4 x 10" R,L  20"x2 ea                   Side stepping  @ bar 8x  @ bar 8x                   HR/TR 30x  30x                   R ankle TB 4 way    np                   Step up/down    10x ea 1R                   Standing march    20x                   Standing hip abd/ext  20x R,L PTB  20x ea PTB                                                                                                                                                                                                                                           Gait training                             Modalities                                        no

## 2022-09-27 ENCOUNTER — TELEPHONE (OUTPATIENT)
Dept: DIABETES SERVICES | Facility: CLINIC | Age: 62
End: 2022-09-27

## 2022-09-30 DIAGNOSIS — E11.65 TYPE 2 DIABETES MELLITUS WITH HYPERGLYCEMIA, WITH LONG-TERM CURRENT USE OF INSULIN (HCC): Primary | ICD-10-CM

## 2022-09-30 DIAGNOSIS — Z79.4 TYPE 2 DIABETES MELLITUS WITH HYPERGLYCEMIA, WITH LONG-TERM CURRENT USE OF INSULIN (HCC): Primary | ICD-10-CM

## 2022-09-30 RX ORDER — FLASH GLUCOSE SCANNING READER
1 EACH MISCELLANEOUS 4 TIMES DAILY
Qty: 1 EACH | Refills: 0 | Status: SHIPPED | OUTPATIENT
Start: 2022-09-30

## 2022-09-30 RX ORDER — FLASH GLUCOSE SENSOR
1 KIT MISCELLANEOUS
Qty: 2 EACH | Refills: 11 | Status: SHIPPED | OUTPATIENT
Start: 2022-09-30

## 2022-09-30 NOTE — TELEPHONE ENCOUNTER
Per [employer] patient can get [employer] only and will need prescription faxed to [phone] and then go to [facility]

## 2022-09-30 NOTE — TELEPHONE ENCOUNTER
Prescription has been faxed and patient informed that once she receives [employer] supplies to call our office to schedule teaching

## 2022-09-30 NOTE — TELEPHONE ENCOUNTER
Left message with insurance [employer] to see if patient can get this through DME or local pharmacy

## 2022-10-17 ENCOUNTER — TELEPHONE (OUTPATIENT)
Dept: OBGYN CLINIC | Facility: CLINIC | Age: 62
End: 2022-10-17

## 2022-10-17 DIAGNOSIS — M35.3 PMR (POLYMYALGIA RHEUMATICA) (HCC): Primary | ICD-10-CM

## 2022-10-17 RX ORDER — PREDNISONE 1 MG/1
TABLET ORAL
Qty: 150 TABLET | Refills: 0 | Status: SHIPPED | OUTPATIENT
Start: 2022-10-17

## 2022-10-17 NOTE — TELEPHONE ENCOUNTER
Caller: Christina Mahmood    Doctor: Matt Grady    Reason for call: Pain and stiffness increased discuss prednisone    Next appt 1/8/23    Call back#: [phone]    Thank you

## 2022-10-17 NOTE — TELEPHONE ENCOUNTER
Spoke with patient she is currently on 4 mg of prednisone since her last visit in March  She is concerned about her A1C it had gone up to 10 and she has gained 30 lbs she is working on bringing down both of these  She is also in the process of getting organized to move to [facility] any day now  Please advise

## 2022-10-17 NOTE — TELEPHONE ENCOUNTER
What dose of prednisone is she currently on? We can increase the steroids to the prior dose that was helping her, please let me know

## 2022-11-22 ENCOUNTER — TELEPHONE (OUTPATIENT)
Dept: ENDOCRINOLOGY | Facility: CLINIC | Age: 62
End: 2022-11-22

## 2022-12-09 ENCOUNTER — TELEPHONE (OUTPATIENT)
Dept: ENDOCRINOLOGY | Facility: CLINIC | Age: 62
End: 2022-12-09

## 2022-12-09 ENCOUNTER — TELEPHONE (OUTPATIENT)
Dept: NEUROLOGY | Facility: CLINIC | Age: 62
End: 2022-12-09

## 2022-12-09 NOTE — TELEPHONE ENCOUNTER
Assisted living called to cancel appt on Monday with [address]  Pt does not want to r/s at this time

## 2022-12-28 ENCOUNTER — TELEPHONE (OUTPATIENT)
Dept: ENDOCRINOLOGY | Facility: CLINIC | Age: 62
End: 2022-12-28

## 2022-12-28 NOTE — TELEPHONE ENCOUNTER
Received request from [facility] pharmacy requesting refills for the pt  They state pt is switching her pharmacy to them  Refills requested -    Humulin R U500 kwikpen  Losartan 100 mg  Metformin  mg  Vit D 4000 units    Other meds requested that we don't fill -    Ammonium lactate lot 12%  Crestor 20 mg  Gabapentin 100 mg     L/m for pt requesting call back to confirm  Told her refills will not be sent until she call back to confirm

## 2022-12-29 NOTE — TELEPHONE ENCOUNTER
Called pt back to discuss  She stated she decided not to switch to them  Confirmed we WILL NOT send them any prescriptions

## 2023-01-01 NOTE — ASSESSMENT & PLAN NOTE
· Progressively getting worse for past week- with weakness- she reports weakness in b/l upper extremities but right worse then left   Unknown etiology at this time     · She has wbc and thrombocytosis   · But cta of upper extremity is negative   · vde is neg  · No evidence of cellulitis   · My concern also is for compartment syndrome - discussed with vascular dr acosta coming in to see patient   · Will keep npo for now till vascular evaluates   · Oxycodone for pain   · Check tsh   · Esr   · Ck   · Uric acid   · Bun normal Statement Selected

## 2023-01-13 ENCOUNTER — TELEPHONE (OUTPATIENT)
Dept: RHEUMATOLOGY | Facility: CLINIC | Age: 63
End: 2023-01-13

## 2023-01-13 ENCOUNTER — HOSPITAL ENCOUNTER (EMERGENCY)
Facility: HOSPITAL | Age: 63
Discharge: HOME | End: 2023-01-13
Attending: EMERGENCY MEDICINE

## 2023-01-13 VITALS
DIASTOLIC BLOOD PRESSURE: 76 MMHG | OXYGEN SATURATION: 100 % | RESPIRATION RATE: 20 BRPM | SYSTOLIC BLOOD PRESSURE: 165 MMHG | TEMPERATURE: 98.5 F | HEART RATE: 101 BPM

## 2023-01-13 DIAGNOSIS — R53.1 WEAK: Primary | ICD-10-CM

## 2023-01-13 LAB
ALBUMIN SERPL BCP-MCNC: 2.8 G/DL (ref 3.5–5)
ALP SERPL-CCNC: 139 U/L (ref 46–116)
ALT SERPL W P-5'-P-CCNC: 13 U/L (ref 12–78)
ANION GAP SERPL CALCULATED.3IONS-SCNC: 12 MMOL/L (ref 4–13)
AST SERPL W P-5'-P-CCNC: 9 U/L (ref 5–45)
ATRIAL RATE: 96 BPM
BASOPHILS # BLD AUTO: 0.05 THOUSANDS/ÂΜL (ref 0–0.1)
BASOPHILS NFR BLD AUTO: 0 % (ref 0–1)
BILIRUB SERPL-MCNC: 0.17 MG/DL (ref 0.2–1)
BUN SERPL-MCNC: 20 MG/DL (ref 5–25)
CALCIUM ALBUM COR SERPL-MCNC: 10.2 MG/DL (ref 8.3–10.1)
CALCIUM SERPL-MCNC: 9.2 MG/DL (ref 8.3–10.1)
CARDIAC TROPONIN I PNL SERPL HS: 3 NG/L
CHLORIDE SERPL-SCNC: 108 MMOL/L (ref 96–108)
CO2 SERPL-SCNC: 24 MMOL/L (ref 21–32)
CREAT SERPL-MCNC: 1.05 MG/DL (ref 0.6–1.3)
EOSINOPHIL # BLD AUTO: 0.3 THOUSAND/ÂΜL (ref 0–0.61)
EOSINOPHIL NFR BLD AUTO: 3 % (ref 0–6)
ERYTHROCYTE [DISTWIDTH] IN BLOOD BY AUTOMATED COUNT: 17.5 % (ref 11.6–15.1)
GFR SERPL CREATININE-BSD FRML MDRD: 56 ML/MIN/1.73SQ M
GLUCOSE SERPL-MCNC: 183 MG/DL (ref 65–140)
HCT VFR BLD AUTO: 33 % (ref 34.8–46.1)
HGB BLD-MCNC: 10 G/DL (ref 11.5–15.4)
IMM GRANULOCYTES # BLD AUTO: 0.08 THOUSAND/UL (ref 0–0.2)
IMM GRANULOCYTES NFR BLD AUTO: 1 % (ref 0–2)
LYMPHOCYTES # BLD AUTO: 1.95 THOUSANDS/ÂΜL (ref 0.6–4.47)
LYMPHOCYTES NFR BLD AUTO: 17 % (ref 14–44)
MAGNESIUM SERPL-MCNC: 2 MG/DL (ref 1.6–2.6)
MCH RBC QN AUTO: 23.9 PG (ref 26.8–34.3)
MCHC RBC AUTO-ENTMCNC: 30.3 G/DL (ref 31.4–37.4)
MCV RBC AUTO: 79 FL (ref 82–98)
MONOCYTES # BLD AUTO: 0.69 THOUSAND/ÂΜL (ref 0.17–1.22)
MONOCYTES NFR BLD AUTO: 6 % (ref 4–12)
NEUTROPHILS # BLD AUTO: 8.44 THOUSANDS/ÂΜL (ref 1.85–7.62)
NEUTS SEG NFR BLD AUTO: 73 % (ref 43–75)
NRBC BLD AUTO-RTO: 0 /100 WBCS
P AXIS: 63 DEGREES
PLATELET # BLD AUTO: 344 THOUSANDS/UL (ref 149–390)
PMV BLD AUTO: 9.5 FL (ref 8.9–12.7)
POTASSIUM SERPL-SCNC: 3.4 MMOL/L (ref 3.5–5.3)
PR INTERVAL: 190 MS
PROT SERPL-MCNC: 7 G/DL (ref 6.4–8.4)
QRS AXIS: 31 DEGREES
QRSD INTERVAL: 100 MS
QT INTERVAL: 382 MS
QTC INTERVAL: 482 MS
RBC # BLD AUTO: 4.19 MILLION/UL (ref 3.81–5.12)
SODIUM SERPL-SCNC: 144 MMOL/L (ref 135–147)
T WAVE AXIS: 66 DEGREES
TSH SERPL DL<=0.05 MIU/L-ACNC: 2.15 UIU/ML (ref 0.45–4.5)
VENTRICULAR RATE: 96 BPM
WBC # BLD AUTO: 11.51 THOUSAND/UL (ref 4.31–10.16)

## 2023-01-13 NOTE — ED NOTES
Pt ambulated with a walker and standby assist x1 to bathroom  Pt had a slow gait, and complained of leg stiffness during ambulation        Jillian Mari  01/13/23 1050

## 2023-01-13 NOTE — TELEPHONE ENCOUNTER
Left detailed message letting patient know that we had to cancel her appointment for 1/18 and asked that she call the office to reschedule

## 2023-01-13 NOTE — ED PROVIDER NOTES
History  Chief Complaint   Patient presents with   • Fall     Pt  Brought by ems from home  Pt  Lives in an apartment and was walking up her stairs when she had leg cramps and let herself to the ground  Pt  Stated that this has happened before, but came in today because "she fell"  However pt  Stated that she let herself to the ground  Pt  Takes thinners, and denies hitting her head  25-year-old female with history of hypertension, diabetes, polymyalgia rheumatica presents to the ED for evaluation after a fall  Patient states over the last several weeks she has been feeling dizzy described as both lightheadedness and vertigo  She states has been having trouble going up and down steps secondary to generalized weakness  She denies chest pain or shortness of breath  Today, she felt like her legs were giving out and her son helped lower her onto the landing on her steps  She did not strike her head  She sat on the steps until EMS arrived  History provided by:  Patient   used: No    Dizziness  Quality:  Unable to specify  Onset quality:  Gradual  Duration:  3 weeks  Timing:  Intermittent  Progression:  Unchanged  Chronicity:  New  Context: physical activity and standing up    Relieved by:  Nothing  Worsened by:  Standing up  Associated symptoms: weakness    Associated symptoms: no blood in stool, no chest pain, no diarrhea, no headaches, no hearing loss, no nausea, no palpitations, no shortness of breath, no syncope, no tinnitus, no vision changes and no vomiting    Weakness:     Duration:  3 weeks    Timing:  Constant    Progression:  Unchanged    Chronicity:  New  Risk factors: multiple medications    Risk factors: no anemia, no heart disease, no hx of stroke, no hx of vertigo, no Meniere's disease and no new medications        Prior to Admission Medications   Prescriptions Last Dose Informant Patient Reported? Taking?    Blood Glucose Monitoring Suppl (OneTouch Verio) w/Device KIT No No   Sig: Use 3 (three) times a day   Patient not taking: Reported on 2022   Blood Pressure Monitor KIT   No No   Sig: Check blood pressures BID   Cholecalciferol (HM Vitamin D3) 100 MCG (4000 UT) CAPS   No No   Si units daily   Continuous Blood Gluc  (FreeStyle Denisa 2 Saunemin) ERIC   No No   Sig: Use 1 each 4 (four) times a day   Continuous Blood Gluc Sensor (FreeStyle Denisa 2 Sensor) MISC   No No   Sig: Use 1 each every 14 (fourteen) days   Diclofenac Sodium POWD   Yes No   Insulin Pen Needle (BD Pen Needle Tita U/F) 32G X 4 MM MISC   No No   Sig: Use 2 (two) times a day   Insulin Regular Human, Conc, (HumuLIN R U-500 KwikPen) 500 units/mL CONCENTRATED U-500 injection pen   No No   Sig: INJECT SUBCUTANEOUSLY 80   UNITS BEFORE BREAKFAST AND  20 UNITS BEFORE DINNER   Lancets (OneTouch Delica Plus [id number]) MISC   No No   Sig: Use 1 each 3 (three) times a day   Patient not taking: Reported on 2022   Riboflavin 400 MG TABS   No No   Sig: Take 1 tablet (400 mg total) by mouth daily   albuterol (Proventil HFA) 90 mcg/act inhaler   No No   Sig: Inhale 2 puffs every 6 (six) hours as needed for wheezing For another 24 hours use every 4 hours standing   aspirin (ECOTRIN LOW STRENGTH) 81 mg EC tablet   Yes No   Sig: Take 81 mg by mouth daily    atorvastatin (LIPITOR) 40 mg tablet   No No   Sig: TAKE 1 TABLET BY MOUTH  DAILY   budesonide-formoterol (SYMBICORT) 80-4 5 MCG/ACT inhaler   No No   Sig: Inhale 2 puffs 2 (two) times a day Rinse mouth after use     diltiazem (CARDIZEM CD) 240 mg 24 hr capsule   No No   Sig: Take 2 capsules (480 mg total) by mouth daily   ferrous sulfate 324 (65 Fe) mg   No No   Sig: Take 1 tablet (324 mg total) by mouth every other day   Patient taking differently: Take 324 mg by mouth daily before breakfast   furosemide (LASIX) 20 mg tablet   No No   Sig: Take 1 tablet (20 mg total) by mouth daily   gabapentin (NEURONTIN) 100 mg capsule   No No   Sig: Take 1 capsule in am and 2-3 capsules at night   losartan (COZAAR) 100 MG tablet   No No   Sig: TAKE 1 TABLET BY MOUTH  DAILY   magnesium oxide (MAG-OX) 400 mg tablet   Yes No   Sig: magnesium oxide 400 mg (241 3 mg magnesium) tablet   TAKE 1 TABLET BY MOUTH EVERY DAY   metFORMIN (GLUCOPHAGE-XR) 500 mg 24 hr tablet   No No   Sig: TAKE 2 TABLETS BY MOUTH  TWICE DAILY WITH MEALS   predniSONE 1 mg tablet   No No   Sig: Combine to take 9 mg once daily  Decrease by 1 mg every 4 weeks  Patient not taking: Reported on 8/25/2022   predniSONE 1 mg tablet   No No   Sig: Combine to take 5-6 mg once daily     topiramate (TOPAMAX) 100 mg tablet   No No   Sig: TAKE 1 TABLET BY MOUTH AT  BEDTIME   urea 40 % LOTN lotion   Yes No   Sig: urea 40 % lotion   APPLY TO THE AFFECTED AREA(S) BY TOPICAL ROUTE 2 TIMES PER DAY      Facility-Administered Medications: None       Past Medical History:   Diagnosis Date   • Anemia    • Arthritis    • Diabetes mellitus ([facility] [address] )    • Dyspnea on exertion    • Hyperlipidemia    • Hypertension    • Legally blind 2010   • Mild intermittent asthma with exacerbation 8/4/2018   • Miscarriage     x 3   • Polymyalgia rheumatica ([facility] [address] ) 11/24/2021   • Seizures ([facility] [address] )    • Sickle cell trait ([facility])    • Sleep apnea    • Stage 3a chronic kidney disease ([facility] [address] ) 5/19/2022   • Thyroid nodule 3/6/2020       Past Surgical History:   Procedure Laterality Date   • CATARACT EXTRACTION Bilateral     august and september   • EYE SURGERY     • HYSTERECTOMY      37   • OOPHORECTOMY Left     37   • [place] LIGATION/BIOPSY TEMPORAL ARTERY Bilateral 10/17/2019    Procedure: BIOPSY ARTERY TEMPORAL;  Surgeon: Mallory Whelan DO;  Location: [facility];  Service: Vascular   • US GUIDED THYROID BIOPSY  5/13/2020       Family History   Problem Relation Age of Onset   • Heart attack Mother    • Heart disease Mother    • Hypertension Mother    • No Known Problems Father    • Heart disease Sister    • No Known Problems Brother    • No Known Problems Son    • No Known Problems Daughter    • Heart attack Maternal Grandmother    • Heart disease Maternal Grandmother    • Diabetes Other    • Heart attack Other    • No Known Problems Sister    • No Known Problems Sister    • No Known Problems Paternal Aunt    • No Known Problems Son    • Cancer Neg Hx    • Stroke Neg Hx      I have reviewed and agree with the history as documented  E-Cigarette/Vaping   • E-Cigarette Use Never User      E-Cigarette/Vaping Substances     Social History     Tobacco Use   • Smoking status: Never   • Smokeless tobacco: Never   Vaping Use   • Vaping Use: Never used   Substance Use Topics   • Alcohol use: Never     Alcohol/week: 0 0 standard drinks   • Drug use: No       Review of Systems   Constitutional: Negative  Negative for chills, diaphoresis, fatigue and fever  HENT: Negative  Negative for congestion, hearing loss, rhinorrhea, sore throat and tinnitus  Eyes: Negative  Negative for discharge, redness and itching  Respiratory: Negative  Negative for apnea, cough, chest tightness, shortness of breath and wheezing  Cardiovascular: Negative for chest pain, palpitations, leg swelling and syncope  Gastrointestinal: Negative  Negative for abdominal pain, blood in stool, diarrhea, nausea and vomiting  Endocrine: Negative  Genitourinary: Negative  Negative for flank pain, frequency and urgency  Musculoskeletal: Negative  Negative for back pain  Skin: Negative  Allergic/Immunologic: Negative  Neurological: Positive for dizziness, weakness and light-headedness  Negative for tremors, seizures, syncope, facial asymmetry, speech difficulty, numbness and headaches  Hematological: Negative  All other systems reviewed and are negative  Physical Exam  Physical Exam  Vitals and nursing note reviewed  Constitutional:       General: She is awake  She is not in acute distress  Appearance: She is well-developed  She is obese   She is not ill-appearing, toxic-appearing or diaphoretic  HENT:      Head: Normocephalic and atraumatic  Right Ear: Tympanic membrane and external ear normal       Left Ear: Tympanic membrane and external ear normal       Nose: Nose normal       Mouth/Throat:      Mouth: Mucous membranes are moist       Pharynx: No oropharyngeal exudate  Eyes:      General: No scleral icterus  Right eye: No discharge  Left eye: No discharge  Extraocular Movements: Extraocular movements intact  Conjunctiva/sclera: Conjunctivae normal       Pupils: Pupils are equal, round, and reactive to light  Neck:      Thyroid: No thyromegaly  Vascular: No JVD  Trachea: No tracheal deviation  Cardiovascular:      Rate and Rhythm: Normal rate and regular rhythm  Heart sounds: Murmur heard  Systolic murmur is present with a grade of 2/6  No friction rub  No gallop  Pulmonary:      Effort: Pulmonary effort is normal  No respiratory distress  Breath sounds: Normal breath sounds  No stridor  No wheezing, rhonchi or rales  Chest:      Chest wall: No tenderness  Abdominal:      General: Bowel sounds are normal  There is no distension  Palpations: Abdomen is soft  There is no mass  Tenderness: There is no abdominal tenderness  Hernia: No hernia is present  Musculoskeletal:         General: No tenderness or deformity  Normal range of motion  Cervical back: Normal range of motion and neck supple  Right lower leg: Edema present  Left lower leg: Edema present  Lymphadenopathy:      Cervical: No cervical adenopathy  Skin:     General: Skin is warm and dry  Coloration: Skin is not jaundiced or pale  Findings: No bruising, erythema, lesion or rash  Neurological:      General: No focal deficit present  Mental Status: She is alert and oriented to person, place, and time  Cranial Nerves: No cranial nerve deficit  Motor: Weakness (Generalized weakness    No focal deficits) present  No abnormal muscle tone  Coordination: Coordination normal       Deep Tendon Reflexes: Reflexes are normal and symmetric  Psychiatric:         Mood and Affect: Mood normal          Behavior: Behavior is cooperative           Vital Signs  ED Triage Vitals [01/13/23 1253]   Temperature Pulse Respirations Blood Pressure SpO2   98 5 °F (36 9 °C) 101 20 165/76 100 %      Temp Source Heart Rate Source Patient Position - Orthostatic VS BP Location FiO2 (%)   Oral Monitor Lying Left arm --      Pain Score       5           Vitals:    01/13/23 1253   BP: 165/76   Pulse: 101   Patient Position - Orthostatic VS: Lying         Visual Acuity      ED Medications  Medications - No data to display    Diagnostic Studies  Results Reviewed     Procedure Component Value Units Date/Time    Magnesium [[id number]]     Lab Status: No result Specimen: Blood     CBC and differential [[id number]]     Lab Status: No result Specimen: Blood     Comprehensive metabolic panel [[id number]]     Lab Status: No result Specimen: Blood     TSH [[id number]]     Lab Status: No result Specimen: Blood     HS Troponin 0hr (reflex protocol) [[id number]]     Lab Status: No result Specimen: Blood                  No orders to display              Procedures  ECG 12 Lead Documentation Only    Date/Time: 1/13/2023 2:00 PM  Performed by: Edmond Beckwith DO  Authorized by: Edmond Beckwith DO     Indications / Diagnosis:  Dizzy  ECG reviewed by me, the ED Provider: yes    Patient location:  ED  Interpretation:     Interpretation: normal    Rate:     ECG rate:  98    ECG rate assessment: normal    Rhythm:     Rhythm: sinus rhythm    Ectopy:     Ectopy: none    Conduction:     Conduction: normal    ST segments:     ST segments:  Normal  T waves:     T waves: normal               ED Course                               SBIRT 22yo+    Flowsheet Row Most Recent Value   SBIRT (23 yo +)    In order to provide better care to our patients, we are screening all of our patients for alcohol and drug use  Would it be okay to ask you these screening questions? Unable to answer at this time Filed at: 01/13/2023 3359                    Medical Decision Making  40-year-old female presents to the ED complaining of generalized weakness and lightheadedness  She states she has been having trouble standing on her feet for a long period of time over the last several weeks  She is having trouble going up and down steps  Today she was going up the steps and her leg started to give out  Her son helped lower her to the landing on the steps  There she called 911  On exam she is alert in no acute distress  She is generally weak on exam   We will check basic labs rule out electrolyte abnormalities, infection  Will check TSH  She has had history of benign thyroid nodules in the past   Will check EKG, troponin    Amount and/or Complexity of Data Reviewed  Labs: ordered  Disposition  Final diagnoses:   None     ED Disposition     None      Follow-up Information    None         Patient's Medications   Discharge Prescriptions    No medications on file       No discharge procedures on file      PDMP Review     None          ED Provider  Electronically Signed by           Eliana Carvajal DO  01/13/23 3796 [facility], [address] [place] [address]  01/14/23 [id number]

## 2023-02-09 ENCOUNTER — HOSPITAL ENCOUNTER (EMERGENCY)
Facility: HOSPITAL | Age: 63
Discharge: HOME | DRG: 074 | End: 2023-02-09
Attending: EMERGENCY MEDICINE
Payer: COMMERCIAL

## 2023-02-09 VITALS
OXYGEN SATURATION: 100 % | RESPIRATION RATE: 18 BRPM | SYSTOLIC BLOOD PRESSURE: 169 MMHG | TEMPERATURE: 98.2 F | DIASTOLIC BLOOD PRESSURE: 84 MMHG | HEART RATE: 94 BPM

## 2023-02-09 DIAGNOSIS — E11.65 TYPE 2 DIABETES MELLITUS WITH HYPERGLYCEMIA, WITH LONG-TERM CURRENT USE OF INSULIN (HCC): ICD-10-CM

## 2023-02-09 DIAGNOSIS — E11.65 HYPERGLYCEMIA DUE TO DIABETES MELLITUS (HCC): ICD-10-CM

## 2023-02-09 DIAGNOSIS — J45.21 MILD INTERMITTENT ASTHMA WITH EXACERBATION: ICD-10-CM

## 2023-02-09 DIAGNOSIS — E78.49 OTHER HYPERLIPIDEMIA: ICD-10-CM

## 2023-02-09 DIAGNOSIS — Z79.4 TYPE 2 DIABETES MELLITUS WITH MICROALBUMINURIA, WITH LONG-TERM CURRENT USE OF INSULIN (HCC): ICD-10-CM

## 2023-02-09 DIAGNOSIS — E11.29 TYPE 2 DIABETES MELLITUS WITH MICROALBUMINURIA, WITH LONG-TERM CURRENT USE OF INSULIN (HCC): ICD-10-CM

## 2023-02-09 DIAGNOSIS — Z79.4 TYPE 2 DIABETES MELLITUS WITH HYPERGLYCEMIA, WITH LONG-TERM CURRENT USE OF INSULIN (HCC): ICD-10-CM

## 2023-02-09 DIAGNOSIS — Z76.0 ENCOUNTER FOR MEDICATION REFILL: Primary | ICD-10-CM

## 2023-02-09 DIAGNOSIS — R80.9 TYPE 2 DIABETES MELLITUS WITH MICROALBUMINURIA, WITH LONG-TERM CURRENT USE OF INSULIN (HCC): ICD-10-CM

## 2023-02-09 DIAGNOSIS — E11.42 DIABETIC POLYNEUROPATHY ASSOCIATED WITH TYPE 2 DIABETES MELLITUS (HCC): ICD-10-CM

## 2023-02-09 DIAGNOSIS — R60.9 EDEMA, UNSPECIFIED TYPE: ICD-10-CM

## 2023-02-09 DIAGNOSIS — I10 ESSENTIAL HYPERTENSION: ICD-10-CM

## 2023-02-09 DIAGNOSIS — E11.9 DM (DIABETES MELLITUS) (HCC): ICD-10-CM

## 2023-02-09 LAB
ALBUMIN SERPL BCP-MCNC: 3.5 G/DL (ref 3.5–5)
ALP SERPL-CCNC: 118 U/L (ref 34–104)
ALT SERPL W P-5'-P-CCNC: 11 U/L (ref 7–52)
ANION GAP SERPL CALCULATED.3IONS-SCNC: 8 MMOL/L (ref 4–13)
AST SERPL W P-5'-P-CCNC: 9 U/L (ref 13–39)
BASE EX.OXY STD BLDV CALC-SCNC: 74 % (ref 60–80)
BASE EXCESS BLDV CALC-SCNC: -1.3 MMOL/L
BASOPHILS # BLD AUTO: 0.08 THOUSANDS/ÂΜL (ref 0–0.1)
BASOPHILS NFR BLD AUTO: 1 % (ref 0–1)
BETA-HYDROXYBUTYRATE: 0.2 MMOL/L
BILIRUB SERPL-MCNC: 0.46 MG/DL (ref 0.2–1)
BUN SERPL-MCNC: 18 MG/DL (ref 5–25)
CALCIUM SERPL-MCNC: 8.9 MG/DL (ref 8.4–10.2)
CHLORIDE SERPL-SCNC: 105 MMOL/L (ref 96–108)
CO2 SERPL-SCNC: 27 MMOL/L (ref 21–32)
CREAT SERPL-MCNC: 1.12 MG/DL (ref 0.6–1.3)
EOSINOPHIL # BLD AUTO: 0.24 THOUSAND/ÂΜL (ref 0–0.61)
EOSINOPHIL NFR BLD AUTO: 2 % (ref 0–6)
ERYTHROCYTE [DISTWIDTH] IN BLOOD BY AUTOMATED COUNT: 17.3 % (ref 11.6–15.1)
GFR SERPL CREATININE-BSD FRML MDRD: 52 ML/MIN/1.73SQ M
GLUCOSE SERPL-MCNC: 297 MG/DL (ref 65–140)
GLUCOSE SERPL-MCNC: 318 MG/DL (ref 65–140)
HCO3 BLDV-SCNC: 24.9 MMOL/L (ref 24–30)
HCT VFR BLD AUTO: 34.7 % (ref 34.8–46.1)
HGB BLD-MCNC: 10.4 G/DL (ref 11.5–15.4)
IMM GRANULOCYTES # BLD AUTO: 0.07 THOUSAND/UL (ref 0–0.2)
IMM GRANULOCYTES NFR BLD AUTO: 1 % (ref 0–2)
LIPASE SERPL-CCNC: 12 U/L (ref 11–82)
LYMPHOCYTES # BLD AUTO: 2.54 THOUSANDS/ÂΜL (ref 0.6–4.47)
LYMPHOCYTES NFR BLD AUTO: 23 % (ref 14–44)
MCH RBC QN AUTO: 23.7 PG (ref 26.8–34.3)
MCHC RBC AUTO-ENTMCNC: 30 G/DL (ref 31.4–37.4)
MCV RBC AUTO: 79 FL (ref 82–98)
MONOCYTES # BLD AUTO: 1.03 THOUSAND/ÂΜL (ref 0.17–1.22)
MONOCYTES NFR BLD AUTO: 9 % (ref 4–12)
NEUTROPHILS # BLD AUTO: 7.1 THOUSANDS/ÂΜL (ref 1.85–7.62)
NEUTS SEG NFR BLD AUTO: 64 % (ref 43–75)
NRBC BLD AUTO-RTO: 0 /100 WBCS
O2 CT BLDV-SCNC: 12.4 ML/DL
PCO2 BLDV: 48.1 MM HG (ref 42–50)
PH BLDV: 7.33 [PH] (ref 7.3–7.4)
PLATELET # BLD AUTO: 381 THOUSANDS/UL (ref 149–390)
PMV BLD AUTO: 10.4 FL (ref 8.9–12.7)
PO2 BLDV: 42.8 MM HG (ref 35–45)
POTASSIUM SERPL-SCNC: 3.5 MMOL/L (ref 3.5–5.3)
PROT SERPL-MCNC: 6.9 G/DL (ref 6.4–8.4)
RBC # BLD AUTO: 4.39 MILLION/UL (ref 3.81–5.12)
SODIUM SERPL-SCNC: 140 MMOL/L (ref 135–147)
WBC # BLD AUTO: 11.06 THOUSAND/UL (ref 4.31–10.16)

## 2023-02-09 PROCEDURE — 99285 EMERGENCY DEPT VISIT HI MDM: CPT | Performed by: PHYSICIAN ASSISTANT

## 2023-02-09 PROCEDURE — 96372 THER/PROPH/DIAG INJ SC/IM: CPT

## 2023-02-09 PROCEDURE — 83690 ASSAY OF LIPASE: CPT | Performed by: PHYSICIAN ASSISTANT

## 2023-02-09 PROCEDURE — 82805 BLOOD GASES W/O2 SATURATION: CPT | Performed by: PHYSICIAN ASSISTANT

## 2023-02-09 PROCEDURE — 85025 COMPLETE CBC W/AUTO DIFF WBC: CPT | Performed by: PHYSICIAN ASSISTANT

## 2023-02-09 PROCEDURE — 82010 KETONE BODYS QUAN: CPT | Performed by: PHYSICIAN ASSISTANT

## 2023-02-09 PROCEDURE — 36415 COLL VENOUS BLD VENIPUNCTURE: CPT | Performed by: PHYSICIAN ASSISTANT

## 2023-02-09 PROCEDURE — 80053 COMPREHEN METABOLIC PANEL: CPT | Performed by: PHYSICIAN ASSISTANT

## 2023-02-09 PROCEDURE — 99284 EMERGENCY DEPT VISIT MOD MDM: CPT

## 2023-02-09 PROCEDURE — 82948 REAGENT STRIP/BLOOD GLUCOSE: CPT

## 2023-02-09 RX ORDER — ASPIRIN 81 MG/1
81 TABLET ORAL DAILY
Qty: 14 TABLET | Refills: 0 | Status: SHIPPED | OUTPATIENT
Start: 2023-02-09 | End: 2023-02-17 | Stop reason: SDUPTHER

## 2023-02-09 RX ORDER — ALBUTEROL SULFATE 90 UG/1
2 AEROSOL, METERED RESPIRATORY (INHALATION) EVERY 6 HOURS PRN
Qty: 6.7 G | Refills: 0 | Status: SHIPPED | OUTPATIENT
Start: 2023-02-09 | End: 2023-02-17 | Stop reason: SDUPTHER

## 2023-02-09 RX ORDER — BUDESONIDE AND FORMOTEROL FUMARATE DIHYDRATE 160; 4.5 UG/1; UG/1
1 AEROSOL RESPIRATORY (INHALATION) 2 TIMES DAILY
Qty: 10.2 G | Refills: 0 | Status: SHIPPED | OUTPATIENT
Start: 2023-02-09 | End: 2023-02-17 | Stop reason: SDUPTHER

## 2023-02-09 RX ORDER — METFORMIN HYDROCHLORIDE 500 MG/1
1000 TABLET, EXTENDED RELEASE ORAL 2 TIMES DAILY WITH MEALS
Qty: 360 TABLET | Refills: 0 | Status: SHIPPED | OUTPATIENT
Start: 2023-02-09 | End: 2023-02-17 | Stop reason: SDUPTHER

## 2023-02-09 RX ORDER — DILTIAZEM HYDROCHLORIDE 240 MG/1
480 CAPSULE, COATED, EXTENDED RELEASE ORAL DAILY
Qty: 180 CAPSULE | Refills: 0 | Status: ON HOLD | OUTPATIENT
Start: 2023-02-09 | End: 2023-02-23

## 2023-02-09 RX ORDER — FUROSEMIDE 20 MG/1
20 TABLET ORAL DAILY
Qty: 14 TABLET | Refills: 0 | Status: SHIPPED | OUTPATIENT
Start: 2023-02-09 | End: 2023-02-17 | Stop reason: SDUPTHER

## 2023-02-09 RX ORDER — INSULIN HUMAN 500 [IU]/ML
INJECTION, SOLUTION SUBCUTANEOUS
Qty: 36 ML | Refills: 0 | Status: SHIPPED | OUTPATIENT
Start: 2023-02-09 | End: 2023-02-17 | Stop reason: SDUPTHER

## 2023-02-09 RX ORDER — LOSARTAN POTASSIUM 100 MG/1
100 TABLET ORAL DAILY
Qty: 90 TABLET | Refills: 0 | Status: SHIPPED | OUTPATIENT
Start: 2023-02-09 | End: 2023-02-17 | Stop reason: SDUPTHER

## 2023-02-09 RX ORDER — ATORVASTATIN CALCIUM 40 MG/1
40 TABLET, FILM COATED ORAL DAILY
Qty: 14 TABLET | Refills: 0 | Status: SHIPPED | OUTPATIENT
Start: 2023-02-09 | End: 2023-02-17 | Stop reason: SDUPTHER

## 2023-02-09 RX ORDER — BLOOD-GLUCOSE METER
EACH MISCELLANEOUS 3 TIMES DAILY
Qty: 1 KIT | Refills: 0 | Status: ON HOLD | OUTPATIENT
Start: 2023-02-09 | End: 2023-02-23

## 2023-02-09 RX ADMIN — INSULIN HUMAN 25 UNITS: 500 INJECTION, SOLUTION SUBCUTANEOUS at 06:08

## 2023-02-09 NOTE — DISCHARGE INSTRUCTIONS
You were seen in the emergency department today for medication refill and hyperglycemia    Laboratory analysis did not reveal any acute abnormalities    Please follow-up with your primary care physician regarding your symptoms  Please return to the emergency department with any worsening symptoms including but not limited to: fevers, dizziness, chest pain, shortness of breath, palpitations, loss of consciousness, abdominal pain, nausea, vomiting, diarrhea, or lower extremity changes

## 2023-02-09 NOTE — ED PROVIDER NOTES
History  Chief Complaint   Patient presents with   • Numbness     [facility] in ronnie  hands/feet  Pt unable to get daily meds from apartment  Elmo Rachel is a 57 y o   female with PMH of pretension, hyperlipidemia, seizure disorder, asthma, insulin dependent diabetic mellitus, and polymyalgia rheumatica who presents to the emergency department with concern about medications  The patient is accompanied by her adult son  Patient recently lost her home and was in a [facility] this evening  States she left all her medications in her home and only left with insulin, states that the shelter took her insulin  She is here so that she can establish medications again  States she has been without them for about 2 days  She currently denies any new complaints  She did originally report to triage that she had numbness but notes that this is chronic especially in her upper extremity-does note there is no change that  Denies any headaches, blurry vision, double vision, numbness, weakness, paresthesias  No polyuria, polydipsia or polyphagia  No chest pain, shortness of breath, palpitations, dizziness or lightheadedness  No abdominal pain, nausea, vomiting, diarrhea  No rashes or lesions  Denies any history of trauma                History provided by:  Patient   used: No    Medical Problem  Location:  Medication refill  Quality:  Loss of housing  Severity:  Mild  Onset quality:  Gradual  Duration:  2 days  Timing:  Constant  Progression:  Unchanged  Chronicity:  New  Context:  Loss of housing    Ineffective treatments:  None tried  Associated symptoms: no abdominal pain, no chest pain, no congestion, no cough, no diarrhea, no ear pain, no fatigue, no fever, no headaches, no loss of consciousness, no myalgias, no nausea, no rash, no rhinorrhea, no shortness of breath, no sore throat, no vomiting and no wheezing        Prior to Admission Medications   Prescriptions Last Dose Informant Patient Reported? Taking?    Blood Glucose Monitoring Suppl (OneTouch Verio) w/Device KIT   No No   Sig: Use 3 (three) times a day   Patient not taking: Reported on 2022   Blood Glucose Monitoring Suppl (OneTouch Verio) w/Device KIT   No Yes   Sig: Use 3 (three) times a day for 14 days   Blood Pressure Monitor KIT   No No   Sig: Check blood pressures BID   Cholecalciferol (HM Vitamin D3) 100 MCG (4000 UT) CAPS   No No   Si units daily   Continuous Blood Gluc  (FreeStyle Denisa 2 [place]) ERIC   No No   Sig: Use 1 each 4 (four) times a day   Continuous Blood Gluc Sensor (FreeStyle Denisa 2 Sensor) MISC   No No   Sig: Use 1 each every 14 (fourteen) days   Diclofenac Sodium POWD   Yes No   Insulin Pen Needle (BD Pen Needle Tita U/F) 32G X 4 MM MISC   No No   Sig: Use 2 (two) times a day   Insulin Regular Human, Conc, (HumuLIN R U-500 KwikPen) 500 units/mL CONCENTRATED U-500 injection pen Past Week  No Yes   Sig: INJECT SUBCUTANEOUSLY 80   UNITS BEFORE BREAKFAST AND  20 UNITS BEFORE DINNER   Insulin Regular Human, Conc, (HumuLIN R U-500 KwikPen) 500 units/mL CONCENTRATED U-500 injection pen   No Yes   Sig: INJECT SUBCUTANEOUSLY 80   UNITS BEFORE BREAKFAST AND  20 UNITS BEFORE DINNER   Lancets (OneTouch Delica Plus [id number]) MISC   No No   Sig: Use 1 each 3 (three) times a day   Patient not taking: Reported on 2022   Riboflavin 400 MG TABS   No No   Sig: Take 1 tablet (400 mg total) by mouth daily   albuterol (Proventil HFA) 90 mcg/act inhaler   No No   Sig: Inhale 2 puffs every 6 (six) hours as needed for wheezing For another 24 hours use every 4 hours standing   albuterol (Proventil HFA) 90 mcg/act inhaler   No Yes   Sig: Inhale 2 puffs every 6 (six) hours as needed for wheezing for up to 14 days For another 24 hours use every 4 hours standing   aspirin (ECOTRIN LOW STRENGTH) 81 mg EC tablet   Yes No   Sig: Take 81 mg by mouth daily    aspirin (ECOTRIN LOW STRENGTH) 81 mg EC tablet   No Yes   Sig: Take 1 tablet (81 mg total) by mouth daily for 14 days   atorvastatin (LIPITOR) 40 mg tablet   No No   Sig: TAKE 1 TABLET BY MOUTH  DAILY   atorvastatin (LIPITOR) 40 mg tablet   No Yes   Sig: Take 1 tablet (40 mg total) by mouth daily for 14 days   budesonide-formoterol (SYMBICORT) 80-4 5 MCG/ACT inhaler   No No   Sig: Inhale 2 puffs 2 (two) times a day Rinse mouth after use  budesonide-formoterol (Symbicort) 160-4 5 mcg/act inhaler   No Yes   Sig: Inhale 1 puff 2 (two) times a day for 14 days Rinse mouth after use  diltiazem (CARDIZEM CD) 240 mg 24 hr capsule   No No   Sig: Take 2 capsules (480 mg total) by mouth daily   diltiazem (CARDIZEM CD) 240 mg 24 hr capsule   No Yes   Sig: Take 2 capsules (480 mg total) by mouth daily for 14 days   ferrous sulfate 324 (65 Fe) mg   No No   Sig: Take 1 tablet (324 mg total) by mouth every other day   Patient taking differently: Take 324 mg by mouth daily before breakfast   furosemide (LASIX) 20 mg tablet   No No   Sig: Take 1 tablet (20 mg total) by mouth daily   furosemide (LASIX) 20 mg tablet   No Yes   Sig: Take 1 tablet (20 mg total) by mouth daily for 14 days   gabapentin (NEURONTIN) 100 mg capsule Past Week  No Yes   Sig: Take 1 capsule in am and 2-3 capsules at night   losartan (COZAAR) 100 MG tablet   No No   Sig: TAKE 1 TABLET BY MOUTH  DAILY   losartan (COZAAR) 100 MG tablet   No Yes   Sig: Take 1 tablet (100 mg total) by mouth daily   magnesium oxide (MAG-OX) 400 mg tablet   Yes No   Sig: magnesium oxide 400 mg (241 3 mg magnesium) tablet   TAKE 1 TABLET BY MOUTH EVERY DAY   metFORMIN (GLUCOPHAGE-XR) 500 mg 24 hr tablet Past Week  No Yes   Sig: TAKE 2 TABLETS BY MOUTH  TWICE DAILY WITH MEALS   metFORMIN (GLUCOPHAGE-XR) 500 mg 24 hr tablet   No Yes   Sig: Take 2 tablets (1,000 mg total) by mouth 2 (two) times a day with meals   predniSONE 1 mg tablet   No No   Sig: Combine to take 9 mg once daily   Decrease by 1 mg every 4 weeks  Patient not taking: Reported on 8/25/2022   predniSONE 1 mg tablet   No No   Sig: Combine to take 5-6 mg once daily  topiramate (TOPAMAX) 100 mg tablet   No No   Sig: TAKE 1 TABLET BY MOUTH AT  BEDTIME   urea 40 % LOTN lotion   Yes No   Sig: urea 40 % lotion   APPLY TO THE AFFECTED AREA(S) BY TOPICAL ROUTE 2 TIMES PER DAY      Facility-Administered Medications: None       Past Medical History:   Diagnosis Date   • Anemia    • Arthritis    • Diabetes mellitus ([place] [address] )    • Dyspnea on exertion    • Hyperlipidemia    • Hypertension    • Legally blind 2010   • Mild intermittent asthma with exacerbation 8/4/2018   • Miscarriage     x 3   • Polymyalgia rheumatica ([place] [address] ) 11/24/2021   • Seizures ([place] [address] )    • Sickle cell trait ([place] [address] )    • Sleep apnea    • Stage 3a chronic kidney disease ([place] [address] ) 5/19/2022   • Thyroid nodule 3/6/2020       Past Surgical History:   Procedure Laterality Date   • CATARACT EXTRACTION Bilateral     august and september   • EYE SURGERY     • HYSTERECTOMY      37   • OOPHORECTOMY Left     37   • IA LIGATION/BIOPSY TEMPORAL ARTERY Bilateral 10/17/2019    Procedure: BIOPSY ARTERY TEMPORAL;  Surgeon: Romeo Calderon DO;  Location: [facility];  Service: Vascular   • US GUIDED THYROID BIOPSY  5/13/2020       Family History   Problem Relation Age of Onset   • Heart attack Mother    • Heart disease Mother    • Hypertension Mother    • No Known Problems Father    • Heart disease Sister    • No Known Problems Brother    • No Known Problems Son    • No Known Problems Daughter    • Heart attack Maternal Grandmother    • Heart disease Maternal Grandmother    • Diabetes Other    • Heart attack Other    • No Known Problems Sister    • No Known Problems Sister    • No Known Problems Paternal Aunt    • No Known Problems Son    • Cancer Neg Hx    • Stroke Neg Hx      I have reviewed and agree with the history as documented      E-Cigarette/Vaping   • E-Cigarette Use Never User E-Cigarette/Vaping Substances     Social History     Tobacco Use   • Smoking status: Never   • Smokeless tobacco: Never   Vaping Use   • Vaping Use: Never used   Substance Use Topics   • Alcohol use: Never     Alcohol/week: 0 0 standard drinks   • Drug use: No       Review of Systems   Constitutional: Negative for activity change, appetite change, chills, diaphoresis, fatigue, fever and unexpected weight change  HENT: Negative for congestion, dental problem, ear pain, mouth sores, nosebleeds, rhinorrhea, sinus pressure, sinus pain, sneezing, sore throat and trouble swallowing  Respiratory: Negative for apnea, cough, choking, chest tightness, shortness of breath, wheezing and stridor  Cardiovascular: Negative for chest pain, palpitations and leg swelling  Gastrointestinal: Negative for abdominal pain, constipation, diarrhea, nausea and vomiting  Genitourinary: Negative for dysuria, flank pain, frequency and urgency  Musculoskeletal: Negative for arthralgias, joint swelling and myalgias  Skin: Negative for color change, pallor and rash  Neurological: Positive for numbness (Chronic, unchanged)  Negative for dizziness, tremors, seizures, loss of consciousness, syncope, speech difficulty, weakness, light-headedness and headaches  All other systems reviewed and are negative  Physical Exam  Physical Exam  Vitals and nursing note reviewed  Constitutional:       General: She is not in acute distress  Appearance: Normal appearance  She is obese  She is not ill-appearing (Chronically ill-appearing but in no acute distress ) or toxic-appearing  HENT:      Head: Normocephalic and atraumatic  Right Ear: Tympanic membrane, ear canal and external ear normal       Left Ear: Tympanic membrane, ear canal and external ear normal       Mouth/Throat:      Mouth: Mucous membranes are moist       Pharynx: Oropharynx is clear  Cardiovascular:      Rate and Rhythm: Normal rate and regular rhythm  Pulses: Normal pulses  Heart sounds: Murmur heard  No friction rub  No gallop  Pulmonary:      Effort: Pulmonary effort is normal  No respiratory distress  Breath sounds: Normal breath sounds  No stridor  No wheezing, rhonchi or rales  Abdominal:      General: Abdomen is flat  Bowel sounds are normal  There is no distension  Palpations: Abdomen is soft  There is no mass  Tenderness: There is no abdominal tenderness  There is no guarding or rebound  Hernia: No hernia is present  Musculoskeletal:         General: No swelling, tenderness, deformity or signs of injury  Normal range of motion  Cervical back: Normal range of motion  No rigidity or tenderness  Right lower leg: Edema present  Left lower leg: Edema present  Skin:     General: Skin is warm and dry  Capillary Refill: Capillary refill takes less than 2 seconds  Neurological:      General: No focal deficit present  Mental Status: She is alert and oriented to person, place, and time  Mental status is at baseline  Cranial Nerves: No cranial nerve deficit  Sensory: No sensory deficit  Motor: No weakness        Coordination: Coordination normal          Vital Signs  ED Triage Vitals   Temperature Pulse Respirations Blood Pressure SpO2   02/09/23 0323 02/09/23 0317 02/09/23 0324 02/09/23 0317 02/09/23 0317   98 2 °F (36 8 °C) 105 20 (!) 185/87 99 %      Temp Source Heart Rate Source Patient Position - Orthostatic VS BP Location FiO2 (%)   02/09/23 0323 -- 02/09/23 0317 02/09/23 0317 --   Oral  Lying Right arm       Pain Score       02/09/23 0317       No Pain           Vitals:    02/09/23 0317 02/09/23 0415 02/09/23 0515 02/09/23 0658   BP: (!) 185/87 160/81 (!) 171/88 169/84   Pulse: 105 96 96 94   Patient Position - Orthostatic VS: Lying   Sitting         Visual Acuity      ED Medications  Medications   insulin regular (HumuLIN R U-500) 500 units/mL CONCENTRATED injection 25 Units (25 Units Subcutaneous Given 2/9/23 0608)       Diagnostic Studies  Results Reviewed     Procedure Component Value Units Date/Time    Fingerstick Glucose (POCT) [[id number]]  (Abnormal) Collected: 02/09/23 0647    Lab Status: Final result Updated: 02/09/23 0648     POC Glucose 297 mg/dl     Beta Hydroxybutyrate [[id number]]  (Normal) Collected: 02/09/23 0350    Lab Status: Final result Specimen: Blood from Arm, Left Updated: 02/09/23 0425     BETA-HYDROXYBUTYRATE 0 2 mmol/L     Comprehensive metabolic panel [[id number]]  (Abnormal) Collected: 02/09/23 0350    Lab Status: Final result Specimen: Blood from Arm, Left Updated: 02/09/23 0422     Sodium 140 mmol/L      Potassium 3 5 mmol/L      Chloride 105 mmol/L      CO2 27 mmol/L      ANION GAP 8 mmol/L      BUN 18 mg/dL      Creatinine 1 12 mg/dL      Glucose 318 mg/dL      Calcium 8 9 mg/dL      AST 9 U/L      ALT 11 U/L      Alkaline Phosphatase 118 U/L      Total Protein 6 9 g/dL      Albumin 3 5 g/dL      Total Bilirubin 0 46 mg/dL      eGFR 52 ml/min/1 73sq m     Narrative:      [place] guidelines for Chronic Kidney Disease (CKD):   •  Stage 1 with normal or high GFR (GFR > 90 mL/min/1 73 square meters)  •  Stage 2 Mild CKD (GFR = 60-89 mL/min/1 73 square meters)  •  Stage 3A Moderate CKD (GFR = 45-59 mL/min/1 73 square meters)  •  Stage 3B Moderate CKD (GFR = 30-44 mL/min/1 73 square meters)  •  Stage 4 Severe CKD (GFR = 15-29 mL/min/1 73 square meters)  •  Stage 5 End Stage CKD (GFR <15 mL/min/1 73 square meters)  Note: GFR calculation is accurate only with a steady state creatinine    Lipase [[id number]]  (Normal) Collected: 02/09/23 0350    Lab Status: Final result Specimen: Blood from Arm, Left Updated: 02/09/23 0422     Lipase 12 u/L     Blood gas, venous [[id number]] Collected: 02/09/23 0350    Lab Status: Final result Specimen: Blood from Arm, Left Updated: 02/09/23 0405     pH, [place] 7 332     pCO2, [place] 48 1 mm Hg      pO2, [place] 42 8 mm Hg      HCO3, [place] 24 9 mmol/L      Base Excess, [place] -1 3 mmol/L      O2 Content, [place] 12 4 ml/dL      O2 HGB, VENOUS 74 0 %     CBC and differential [[id number]]  (Abnormal) Collected: 02/09/23 0350    Lab Status: Final result Specimen: Blood from Arm, Left Updated: 02/09/23 0402     WBC 11 06 Thousand/uL      RBC 4 39 Million/uL      Hemoglobin 10 4 g/dL      Hematocrit 34 7 %      MCV 79 fL      MCH 23 7 pg      MCHC 30 0 g/dL      RDW 17 3 %      MPV 10 4 fL      Platelets 327 Thousands/uL      nRBC 0 /100 WBCs      Neutrophils Relative 64 %      Immat GRANS % 1 %      Lymphocytes Relative 23 %      Monocytes Relative 9 %      Eosinophils Relative 2 %      Basophils Relative 1 %      Neutrophils Absolute 7 10 Thousands/µL      Immature Grans Absolute 0 07 Thousand/uL      Lymphocytes Absolute 2 54 Thousands/µL      Monocytes Absolute 1 03 Thousand/µL      Eosinophils Absolute 0 24 Thousand/µL      Basophils Absolute 0 08 Thousands/µL                  No orders to display              Procedures  Procedures         ED Course                               SBIRT 22yo+    Flowsheet Row Most Recent Value   SBIRT (25 yo +)    In order to provide better care to our patients, we are screening all of our patients for alcohol and drug use  Would it be okay to ask you these screening questions? Yes Filed at: 02/09/2023 6600   Initial Alcohol Screen: US AUDIT-C     1  How often do you have a drink containing alcohol? 0 Filed at: 02/09/2023 0325   2  How many drinks containing alcohol do you have on a typical day you are drinking? 0 Filed at: 02/09/2023 0325   3b  FEMALE Any Age, or MALE 65+: How often do you have 4 or more drinks on one occassion? 0 Filed at: 02/09/2023 0325   Audit-C Score 0 Filed at: 02/09/2023 2787   JANIE: How many times in the past year have you    Used an illegal drug or used a prescription medication for non-medical reasons?  Never Filed at: 02/09/2023 [address] [place] [address] Making  Patient was seen and examined  in the emergency department for chief complaint of signs about medication     Patient recently lost her housing  Does not have intermittent medications  Denies complaints  Patient is chronically ill-appearing no acute distress  No focal deficits  Lungs are clear  Lower extremity edema  Abdomen is soft nontender nondistend    Differential diagnosis including but not limited to: DKA, hyperglycemia, metabolic abnormality, dehydration noncompliance with medications  Workup: Basic labs to rule out DKA/electrolyte abnormality  CBC to look for leukocytosis which is negative  Mild anemia but actually improved from baseline  CMP without electrolyte abnormalities, no liver dysfunction, no renal dysfunction  Elevated alk phos, chronic  VBG to look for metabolic abnormality-is without acidosis or alkalosis     Beta hydroxybutyrate to look for ketosis is negative     She is mildly hyperglycemic  No DKA or ketosis  We will give her her morning dose of insulin and represcribe all her medications so she can pick them up  Patient is in agreement with plan  - no evidence of dka, no metabolic abnormalities, no evidence of infection, will refill home meds at home dose and d/c  Disposition:General impression 57year old female with request for medication refill  No other acute complaints  Hyperglycemic but not DKA, no metabolic abnormalities  RTED and F/U discussed  The patient was discharged in stable condition  Patient ambulated off the department  Extensive return to emergency department precautions were discussed  Follow up with appropriate providers including primary care physician was discussed  Patient and/or their  primary decision maker expressed understanding  Patient remained stable during entire emergency department stay        Diabetic polyneuropathy associated with type 2 diabetes mellitus ([facility] [address] ): self-limited or minor problem  DM (diabetes mellitus) ([place] [address] ): acute illness or injury  Edema, unspecified type: acute illness or injury  Encounter for medication refill: acute illness or injury  Essential hypertension: self-limited or minor problem  Hyperglycemia due to diabetes mellitus ([place] [address] ): acute illness or injury  Mild intermittent asthma with exacerbation: self-limited or minor problem  Other hyperlipidemia: self-limited or minor problem  Type 2 diabetes mellitus with hyperglycemia, with long-term current use of insulin ([place] [address] ): self-limited or minor problem  Type 2 diabetes mellitus with microalbuminuria, with long-term current use of insulin ([place] [address] ): self-limited or minor problem  Amount and/or Complexity of Data Reviewed  Labs: ordered  Risk  OTC drugs  Prescription drug management            Disposition  Final diagnoses:   Encounter for medication refill   Hyperglycemia due to diabetes mellitus ([place] [address] )   Mild intermittent asthma with exacerbation   Other hyperlipidemia   Type 2 diabetes mellitus with microalbuminuria, with long-term current use of insulin ([facility])   Essential hypertension   Edema, unspecified type   Type 2 diabetes mellitus with hyperglycemia, with long-term current use of insulin ([place] [address] )   DM (diabetes mellitus) ([place] [address] )   Diabetic polyneuropathy associated with type 2 diabetes mellitus ([place] [address] )     Time reflects when diagnosis was documented in both MDM as applicable and the Disposition within this note     Time User Action Codes Description Comment    2/9/2023  6:48 AM Jo Arn Add [Z76 0] Encounter for medication refill     2/9/2023  6:48 AM Rondi [place], Quilla Peeling Add [E11 65] Hyperglycemia due to diabetes mellitus ([place] [address] )     2/9/2023  6:48 AM Jo Arn Add [J45 21] Mild intermittent asthma with exacerbation     2/9/2023  6:48 AM Jo Arn Add [E78 49] Other hyperlipidemia     2/9/2023  6:49 AM Rondi [place], Quilla Peeling Add [E11 29,  R80 9,  Z79 4] Type 2 diabetes mellitus with microalbuminuria, with long-term current use of insulin [facility]  ) 2/9/2023  6:49 AM Katie Duff Add [I10] Essential hypertension     2/9/2023  6:49 AM Katie Duff Add [R60 9] Edema, unspecified type     2/9/2023  6:50 AM Katie Duff Add [E11 65,  Z79 4] Type 2 diabetes mellitus with hyperglycemia, with long-term current use of insulin ([facility] [address] )     2/9/2023  6:50 AM Katie Duff Add [E11 9] DM (diabetes mellitus) ([facility] [address] )     2/9/2023  6:51 AM Katie Duff Add [E11 42] Diabetic polyneuropathy associated with type 2 diabetes mellitus [facility])       ED Disposition     ED Disposition   Discharge    Condition   Stable    Date/Time   Thu Feb 9, 2023  6:48 AM    Comment   Danitza London discharge to home/self care                 Follow-up Information     Follow up With Specialties Details Why [facility] Emergency Department Emergency Medicine Go to  As needed, If symptoms worsen [facility] [id number]  [address] [place] [address] Emergency Department, [address] [place] [address] 200  Call  To schedule an appointment with a primary care physician [phone]       Wendy Catalan MD Family Medicine Schedule an appointment as soon as possible for a visit   [address] [place] [address]  [address] [place] [address]  [phone]             Discharge Medication List as of 2/9/2023  6:54 AM      CONTINUE these medications which have CHANGED    Details   albuterol (Proventil HFA) 90 mcg/act inhaler Inhale 2 puffs every 6 (six) hours as needed for wheezing for up to 14 days For another 24 hours use every 4 hours standing, Starting Thu 2/9/2023, Until Thu 2/23/2023 at 2359, Normal      aspirin (ECOTRIN LOW STRENGTH) 81 mg EC tablet Take 1 tablet (81 mg total) by mouth daily for 14 days, Starting Thu 2/9/2023, Until Thu 2/23/2023, Normal      atorvastatin (LIPITOR) 40 mg tablet Take 1 tablet (40 mg total) by mouth daily for 14 days, Starting Thu 2/9/2023, Until Thu 2/23/2023, Normal      Blood Glucose Monitoring Suppl (OneTouch Verio) w/Device KIT Use 3 (three) times a day for 14 days, Starting Thu 2/9/2023, Until Thu 2/23/2023, Normal      budesonide-formoterol (Symbicort) 160-4 5 mcg/act inhaler Inhale 1 puff 2 (two) times a day for 14 days Rinse mouth after use , Starting Thu 2/9/2023, Until Thu 2/23/2023, Normal      diltiazem (CARDIZEM CD) 240 mg 24 hr capsule Take 2 capsules (480 mg total) by mouth daily for 14 days, Starting Thu 2/9/2023, Until Thu 2/23/2023, Normal      furosemide (LASIX) 20 mg tablet Take 1 tablet (20 mg total) by mouth daily for 14 days, Starting Thu 2/9/2023, Until Thu 2/23/2023, Normal      Insulin Regular Human, Conc, (HumuLIN R U-500 KwikPen) 500 units/mL CONCENTRATED U-500 injection pen INJECT SUBCUTANEOUSLY 80   UNITS BEFORE BREAKFAST AND  20 UNITS BEFORE DINNER, Normal      losartan (COZAAR) 100 MG tablet Take 1 tablet (100 mg total) by mouth daily, Starting Thu 2/9/2023, Normal      metFORMIN (GLUCOPHAGE-XR) 500 mg 24 hr tablet Take 2 tablets (1,000 mg total) by mouth 2 (two) times a day with meals, Starting Thu 2/9/2023, Normal         CONTINUE these medications which have NOT CHANGED    Details   gabapentin (NEURONTIN) 100 mg capsule Take 1 capsule in am and 2-3 capsules at night, Normal      Blood Pressure Monitor KIT Check blood pressures BID, Print      Cholecalciferol (HM Vitamin D3) 100 MCG (4000 UT) CAPS 4000 units daily, No Print      Continuous Blood Gluc  (FreeStyle [place] 2 [place]) ERIC Use 1 each 4 (four) times a day, Starting Fri 9/30/2022, Print      Continuous Blood Gluc Sensor (FreeStyle Denisa 2 Sensor) MISC Use 1 each every 14 (fourteen) days, Starting Fri 9/30/2022, Print      Diclofenac Sodium POWD Starting Wed 5/6/2020, Historical Med      ferrous sulfate 324 (65 Fe) mg Take 1 tablet (324 mg total) by mouth every other day, Starting Fri 12/20/2019, Normal      Insulin Pen Needle (BD Pen Needle Tita U/F) 32G X 4 MM MISC Use 2 (two) times a day, Starting Thu 3/18/2021, Normal      Lancets (OneTouch Delica Plus [id number]) MISC Use 1 each 3 (three) times a day, Starting Wed 10/20/2021, Normal      magnesium oxide (MAG-OX) 400 mg tablet magnesium oxide 400 mg (241 3 mg magnesium) tablet   TAKE 1 TABLET BY MOUTH EVERY DAY, Historical Med      !! predniSONE 1 mg tablet Combine to take 9 mg once daily  Decrease by 1 mg every 4 weeks  , Normal      !! predniSONE 1 mg tablet Combine to take 5-6 mg once daily  , Normal      Riboflavin 400 MG TABS Take 1 tablet (400 mg total) by mouth daily, Starting Fri 3/6/2020, Normal      topiramate (TOPAMAX) 100 mg tablet TAKE 1 TABLET BY MOUTH AT  BEDTIME, Normal      urea 40 % LOTN lotion urea 40 % lotion   APPLY TO THE AFFECTED AREA(S) BY TOPICAL ROUTE 2 TIMES PER DAY, Historical Med       !! - Potential duplicate medications found  Please discuss with provider  No discharge procedures on file      PDMP Review     None          ED Provider  Electronically Signed by           Corine Faust PA-C  02/09/23 3514

## 2023-02-10 PROCEDURE — 99285 EMERGENCY DEPT VISIT HI MDM: CPT

## 2023-02-10 PROCEDURE — 1123F ACP DISCUSS/DSCN MKR DOCD: CPT | Performed by: EMERGENCY MEDICINE

## 2023-02-11 ENCOUNTER — HOSPITAL ENCOUNTER (INPATIENT)
Facility: HOSPITAL | Age: 63
LOS: 3 days | Discharge: HOME | DRG: 074 | End: 2023-02-14
Attending: EMERGENCY MEDICINE | Admitting: INTERNAL MEDICINE
Payer: COMMERCIAL

## 2023-02-11 ENCOUNTER — APPOINTMENT (EMERGENCY)
Dept: RADIOLOGY | Facility: HOSPITAL | Age: 63
DRG: 074 | End: 2023-02-11
Payer: COMMERCIAL

## 2023-02-11 ENCOUNTER — APPOINTMENT (INPATIENT)
Dept: CT IMAGING | Facility: HOSPITAL | Age: 63
DRG: 074 | End: 2023-02-11
Payer: COMMERCIAL

## 2023-02-11 DIAGNOSIS — I10 ESSENTIAL HYPERTENSION: ICD-10-CM

## 2023-02-11 DIAGNOSIS — D50.9 MICROCYTIC ANEMIA: ICD-10-CM

## 2023-02-11 DIAGNOSIS — M35.3 POLYMYALGIA RHEUMATICA (HCC): ICD-10-CM

## 2023-02-11 DIAGNOSIS — E66.01 MORBID OBESITY (HCC): ICD-10-CM

## 2023-02-11 DIAGNOSIS — M35.3 PMR (POLYMYALGIA RHEUMATICA) (HCC): ICD-10-CM

## 2023-02-11 DIAGNOSIS — A41.9 SEPSIS (HCC): ICD-10-CM

## 2023-02-11 DIAGNOSIS — J18.9 PNEUMONIA: Primary | ICD-10-CM

## 2023-02-11 DIAGNOSIS — R20.0 LEG NUMBNESS: ICD-10-CM

## 2023-02-11 DIAGNOSIS — R53.1 WEAKNESS: ICD-10-CM

## 2023-02-11 PROBLEM — R82.90 ABNORMAL URINALYSIS: Status: ACTIVE | Noted: 2023-02-11

## 2023-02-11 PROBLEM — L30.4 INTERTRIGO: Status: ACTIVE | Noted: 2023-02-11

## 2023-02-11 LAB
2HR DELTA HS TROPONIN: 0 NG/L
4HR DELTA HS TROPONIN: 1 NG/L
ALBUMIN SERPL BCP-MCNC: 3.4 G/DL (ref 3.5–5)
ALP SERPL-CCNC: 114 U/L (ref 34–104)
ALT SERPL W P-5'-P-CCNC: 14 U/L (ref 7–52)
ANION GAP SERPL CALCULATED.3IONS-SCNC: 8 MMOL/L (ref 4–13)
APTT PPP: 30 SECONDS (ref 23–37)
AST SERPL W P-5'-P-CCNC: 13 U/L (ref 13–39)
ATRIAL RATE: 107 BPM
ATRIAL RATE: 107 BPM
ATRIAL RATE: 111 BPM
BACTERIA UR QL AUTO: ABNORMAL /HPF
BASOPHILS # BLD AUTO: 0.07 THOUSANDS/ÂΜL (ref 0–0.1)
BASOPHILS NFR BLD AUTO: 1 % (ref 0–1)
BILIRUB SERPL-MCNC: 0.42 MG/DL (ref 0.2–1)
BILIRUB UR QL STRIP: NEGATIVE
BUN SERPL-MCNC: 17 MG/DL (ref 5–25)
CALCIUM ALBUM COR SERPL-MCNC: 9.6 MG/DL (ref 8.3–10.1)
CALCIUM SERPL-MCNC: 9.1 MG/DL (ref 8.4–10.2)
CARDIAC TROPONIN I PNL SERPL HS: 7 NG/L
CARDIAC TROPONIN I PNL SERPL HS: 7 NG/L
CARDIAC TROPONIN I PNL SERPL HS: 8 NG/L
CHLORIDE SERPL-SCNC: 108 MMOL/L (ref 96–108)
CLARITY UR: CLEAR
CO2 SERPL-SCNC: 26 MMOL/L (ref 21–32)
COLOR UR: YELLOW
CREAT SERPL-MCNC: 1.3 MG/DL (ref 0.6–1.3)
EOSINOPHIL # BLD AUTO: 0.1 THOUSAND/ÂΜL (ref 0–0.61)
EOSINOPHIL NFR BLD AUTO: 1 % (ref 0–6)
ERYTHROCYTE [DISTWIDTH] IN BLOOD BY AUTOMATED COUNT: 17.3 % (ref 11.6–15.1)
FLUAV RNA RESP QL NAA+PROBE: NEGATIVE
FLUBV RNA RESP QL NAA+PROBE: NEGATIVE
GFR SERPL CREATININE-BSD FRML MDRD: 43 ML/MIN/1.73SQ M
GLUCOSE SERPL-MCNC: 140 MG/DL (ref 65–140)
GLUCOSE SERPL-MCNC: 142 MG/DL (ref 65–140)
GLUCOSE SERPL-MCNC: 257 MG/DL (ref 65–140)
GLUCOSE SERPL-MCNC: 329 MG/DL (ref 65–140)
GLUCOSE SERPL-MCNC: 48 MG/DL (ref 65–140)
GLUCOSE SERPL-MCNC: 85 MG/DL (ref 65–140)
GLUCOSE UR STRIP-MCNC: ABNORMAL MG/DL
HCT VFR BLD AUTO: 34.8 % (ref 34.8–46.1)
HGB BLD-MCNC: 10.2 G/DL (ref 11.5–15.4)
HGB UR QL STRIP.AUTO: ABNORMAL
HYALINE CASTS #/AREA URNS LPF: ABNORMAL /LPF
IMM GRANULOCYTES # BLD AUTO: 0.11 THOUSAND/UL (ref 0–0.2)
IMM GRANULOCYTES NFR BLD AUTO: 1 % (ref 0–2)
INR PPP: 1.05 (ref 0.84–1.19)
KETONES UR STRIP-MCNC: NEGATIVE MG/DL
LACTATE SERPL-SCNC: 1.5 MMOL/L (ref 0.5–2)
LACTATE SERPL-SCNC: 2.1 MMOL/L (ref 0.5–2)
LACTATE SERPL-SCNC: 2.4 MMOL/L (ref 0.5–2)
LEUKOCYTE ESTERASE UR QL STRIP: NEGATIVE
LYMPHOCYTES # BLD AUTO: 1.86 THOUSANDS/ÂΜL (ref 0.6–4.47)
LYMPHOCYTES NFR BLD AUTO: 13 % (ref 14–44)
MCH RBC QN AUTO: 23.4 PG (ref 26.8–34.3)
MCHC RBC AUTO-ENTMCNC: 29.3 G/DL (ref 31.4–37.4)
MCV RBC AUTO: 80 FL (ref 82–98)
MONOCYTES # BLD AUTO: 1.01 THOUSAND/ÂΜL (ref 0.17–1.22)
MONOCYTES NFR BLD AUTO: 7 % (ref 4–12)
MUCOUS THREADS UR QL AUTO: ABNORMAL
NEUTROPHILS # BLD AUTO: 10.82 THOUSANDS/ÂΜL (ref 1.85–7.62)
NEUTS SEG NFR BLD AUTO: 77 % (ref 43–75)
NITRITE UR QL STRIP: NEGATIVE
NON-SQ EPI CELLS URNS QL MICRO: ABNORMAL /HPF
NRBC BLD AUTO-RTO: 0 /100 WBCS
P AXIS: 59 DEGREES
P AXIS: 61 DEGREES
P AXIS: 61 DEGREES
PH UR STRIP.AUTO: 6 [PH]
PLATELET # BLD AUTO: 363 THOUSANDS/UL (ref 149–390)
PMV BLD AUTO: 9.8 FL (ref 8.9–12.7)
POTASSIUM SERPL-SCNC: 3.7 MMOL/L (ref 3.5–5.3)
PR INTERVAL: 164 MS
PR INTERVAL: 166 MS
PR INTERVAL: 168 MS
PROCALCITONIN SERPL-MCNC: 0.08 NG/ML
PROT SERPL-MCNC: 6.9 G/DL (ref 6.4–8.4)
PROT UR STRIP-MCNC: >=300 MG/DL
PROTHROMBIN TIME: 13.7 SECONDS (ref 11.6–14.5)
QRS AXIS: 29 DEGREES
QRS AXIS: 31 DEGREES
QRS AXIS: 38 DEGREES
QRSD INTERVAL: 86 MS
QRSD INTERVAL: 92 MS
QRSD INTERVAL: 96 MS
QT INTERVAL: 364 MS
QT INTERVAL: 368 MS
QT INTERVAL: 380 MS
QTC INTERVAL: 485 MS
QTC INTERVAL: 500 MS
QTC INTERVAL: 507 MS
RBC # BLD AUTO: 4.35 MILLION/UL (ref 3.81–5.12)
RBC #/AREA URNS AUTO: ABNORMAL /HPF
RSV RNA RESP QL NAA+PROBE: NEGATIVE
SARS-COV-2 RNA RESP QL NAA+PROBE: NEGATIVE
SODIUM SERPL-SCNC: 142 MMOL/L (ref 135–147)
SP GR UR STRIP.AUTO: >=1.03 (ref 1–1.03)
T WAVE AXIS: 29 DEGREES
T WAVE AXIS: 40 DEGREES
T WAVE AXIS: 57 DEGREES
UROBILINOGEN UR QL STRIP.AUTO: 0.2 E.U./DL
VENTRICULAR RATE: 107 BPM
VENTRICULAR RATE: 107 BPM
VENTRICULAR RATE: 111 BPM
WBC # BLD AUTO: 13.97 THOUSAND/UL (ref 4.31–10.16)
WBC #/AREA URNS AUTO: ABNORMAL /HPF

## 2023-02-11 PROCEDURE — 99285 EMERGENCY DEPT VISIT HI MDM: CPT | Performed by: EMERGENCY MEDICINE

## 2023-02-11 PROCEDURE — 80053 COMPREHEN METABOLIC PANEL: CPT

## 2023-02-11 PROCEDURE — 83605 ASSAY OF LACTIC ACID: CPT

## 2023-02-11 PROCEDURE — 93010 ELECTROCARDIOGRAM REPORT: CPT | Performed by: INTERNAL MEDICINE

## 2023-02-11 PROCEDURE — 85610 PROTHROMBIN TIME: CPT

## 2023-02-11 PROCEDURE — 87040 BLOOD CULTURE FOR BACTERIA: CPT

## 2023-02-11 PROCEDURE — 72131 CT LUMBAR SPINE W/O DYE: CPT

## 2023-02-11 PROCEDURE — 84484 ASSAY OF TROPONIN QUANT: CPT

## 2023-02-11 PROCEDURE — 99221 1ST HOSP IP/OBS SF/LOW 40: CPT | Performed by: PSYCHIATRY & NEUROLOGY

## 2023-02-11 PROCEDURE — 93005 ELECTROCARDIOGRAM TRACING: CPT

## 2023-02-11 PROCEDURE — 99223 1ST HOSP IP/OBS HIGH 75: CPT | Performed by: INTERNAL MEDICINE

## 2023-02-11 PROCEDURE — 72125 CT NECK SPINE W/O DYE: CPT

## 2023-02-11 PROCEDURE — 84145 PROCALCITONIN (PCT): CPT

## 2023-02-11 PROCEDURE — 71045 X-RAY EXAM CHEST 1 VIEW: CPT

## 2023-02-11 PROCEDURE — 83605 ASSAY OF LACTIC ACID: CPT | Performed by: PHYSICIAN ASSISTANT

## 2023-02-11 PROCEDURE — 96361 HYDRATE IV INFUSION ADD-ON: CPT

## 2023-02-11 PROCEDURE — 36415 COLL VENOUS BLD VENIPUNCTURE: CPT

## 2023-02-11 PROCEDURE — 87086 URINE CULTURE/COLONY COUNT: CPT | Performed by: PHYSICIAN ASSISTANT

## 2023-02-11 PROCEDURE — 82948 REAGENT STRIP/BLOOD GLUCOSE: CPT

## 2023-02-11 PROCEDURE — 81001 URINALYSIS AUTO W/SCOPE: CPT

## 2023-02-11 PROCEDURE — 70450 CT HEAD/BRAIN W/O DYE: CPT

## 2023-02-11 PROCEDURE — G1004 CDSM NDSC: HCPCS

## 2023-02-11 PROCEDURE — 0241U HB NFCT DS VIR RESP RNA 4 TRGT: CPT

## 2023-02-11 PROCEDURE — 96365 THER/PROPH/DIAG IV INF INIT: CPT

## 2023-02-11 PROCEDURE — 85025 COMPLETE CBC W/AUTO DIFF WBC: CPT

## 2023-02-11 PROCEDURE — 85730 THROMBOPLASTIN TIME PARTIAL: CPT

## 2023-02-11 RX ORDER — SODIUM CHLORIDE, SODIUM GLUCONATE, SODIUM ACETATE, POTASSIUM CHLORIDE, MAGNESIUM CHLORIDE, SODIUM PHOSPHATE, DIBASIC, AND POTASSIUM PHOSPHATE .53; .5; .37; .037; .03; .012; .00082 G/100ML; G/100ML; G/100ML; G/100ML; G/100ML; G/100ML; G/100ML
500 INJECTION, SOLUTION INTRAVENOUS ONCE
Status: COMPLETED | OUTPATIENT
Start: 2023-02-11 | End: 2023-02-11

## 2023-02-11 RX ORDER — GABAPENTIN 100 MG/1
100 CAPSULE ORAL 2 TIMES DAILY
Status: DISCONTINUED | OUTPATIENT
Start: 2023-02-11 | End: 2023-02-14 | Stop reason: HOSPADM

## 2023-02-11 RX ORDER — PREDNISONE 1 MG/1
4 TABLET ORAL DAILY
Status: DISCONTINUED | OUTPATIENT
Start: 2023-02-11 | End: 2023-02-14 | Stop reason: HOSPADM

## 2023-02-11 RX ORDER — SODIUM CHLORIDE, SODIUM GLUCONATE, SODIUM ACETATE, POTASSIUM CHLORIDE, MAGNESIUM CHLORIDE, SODIUM PHOSPHATE, DIBASIC, AND POTASSIUM PHOSPHATE .53; .5; .37; .037; .03; .012; .00082 G/100ML; G/100ML; G/100ML; G/100ML; G/100ML; G/100ML; G/100ML
75 INJECTION, SOLUTION INTRAVENOUS CONTINUOUS
Status: DISCONTINUED | OUTPATIENT
Start: 2023-02-11 | End: 2023-02-12

## 2023-02-11 RX ORDER — ONDANSETRON 2 MG/ML
4 INJECTION INTRAMUSCULAR; INTRAVENOUS EVERY 6 HOURS PRN
Status: DISCONTINUED | OUTPATIENT
Start: 2023-02-11 | End: 2023-02-14 | Stop reason: HOSPADM

## 2023-02-11 RX ORDER — HEPARIN SODIUM 5000 [USP'U]/ML
5000 INJECTION, SOLUTION INTRAVENOUS; SUBCUTANEOUS EVERY 8 HOURS SCHEDULED
Status: DISCONTINUED | OUTPATIENT
Start: 2023-02-11 | End: 2023-02-14 | Stop reason: HOSPADM

## 2023-02-11 RX ORDER — CEFTRIAXONE 2 G/50ML
2000 INJECTION, SOLUTION INTRAVENOUS ONCE
Status: COMPLETED | OUTPATIENT
Start: 2023-02-11 | End: 2023-02-11

## 2023-02-11 RX ORDER — FUROSEMIDE 20 MG/1
20 TABLET ORAL DAILY
Status: DISCONTINUED | OUTPATIENT
Start: 2023-02-11 | End: 2023-02-14 | Stop reason: HOSPADM

## 2023-02-11 RX ORDER — INSULIN LISPRO 100 [IU]/ML
2-12 INJECTION, SOLUTION INTRAVENOUS; SUBCUTANEOUS
Status: DISCONTINUED | OUTPATIENT
Start: 2023-02-11 | End: 2023-02-14 | Stop reason: HOSPADM

## 2023-02-11 RX ORDER — DILTIAZEM HYDROCHLORIDE 240 MG/1
480 CAPSULE, COATED, EXTENDED RELEASE ORAL DAILY
Status: DISCONTINUED | OUTPATIENT
Start: 2023-02-11 | End: 2023-02-14 | Stop reason: HOSPADM

## 2023-02-11 RX ORDER — ASPIRIN 81 MG/1
81 TABLET ORAL DAILY
Status: DISCONTINUED | OUTPATIENT
Start: 2023-02-11 | End: 2023-02-14 | Stop reason: HOSPADM

## 2023-02-11 RX ORDER — BUDESONIDE AND FORMOTEROL FUMARATE DIHYDRATE 160; 4.5 UG/1; UG/1
1 AEROSOL RESPIRATORY (INHALATION) 2 TIMES DAILY
Status: DISCONTINUED | OUTPATIENT
Start: 2023-02-11 | End: 2023-02-14 | Stop reason: HOSPADM

## 2023-02-11 RX ORDER — ACETAMINOPHEN 325 MG/1
650 TABLET ORAL EVERY 6 HOURS PRN
Status: DISCONTINUED | OUTPATIENT
Start: 2023-02-11 | End: 2023-02-14 | Stop reason: HOSPADM

## 2023-02-11 RX ORDER — ATORVASTATIN CALCIUM 40 MG/1
40 TABLET, FILM COATED ORAL
Status: DISCONTINUED | OUTPATIENT
Start: 2023-02-11 | End: 2023-02-14 | Stop reason: HOSPADM

## 2023-02-11 RX ORDER — LOSARTAN POTASSIUM 50 MG/1
100 TABLET ORAL DAILY
Status: DISCONTINUED | OUTPATIENT
Start: 2023-02-11 | End: 2023-02-14 | Stop reason: HOSPADM

## 2023-02-11 RX ORDER — METOPROLOL TARTRATE 50 MG/1
50 TABLET, FILM COATED ORAL EVERY 12 HOURS SCHEDULED
Status: DISCONTINUED | OUTPATIENT
Start: 2023-02-11 | End: 2023-02-14 | Stop reason: HOSPADM

## 2023-02-11 RX ORDER — CEFTRIAXONE 1 G/50ML
1000 INJECTION, SOLUTION INTRAVENOUS EVERY 24 HOURS
Status: DISCONTINUED | OUTPATIENT
Start: 2023-02-12 | End: 2023-02-12

## 2023-02-11 RX ORDER — LABETALOL HYDROCHLORIDE 5 MG/ML
10 INJECTION, SOLUTION INTRAVENOUS EVERY 6 HOURS PRN
Status: DISCONTINUED | OUTPATIENT
Start: 2023-02-11 | End: 2023-02-14 | Stop reason: HOSPADM

## 2023-02-11 RX ADMIN — LOSARTAN POTASSIUM 100 MG: 50 TABLET, FILM COATED ORAL at 08:12

## 2023-02-11 RX ADMIN — INSULIN LISPRO 6 UNITS: 100 INJECTION, SOLUTION INTRAVENOUS; SUBCUTANEOUS at 21:45

## 2023-02-11 RX ADMIN — INSULIN LISPRO 8 UNITS: 100 INJECTION, SOLUTION INTRAVENOUS; SUBCUTANEOUS at 17:03

## 2023-02-11 RX ADMIN — HEPARIN SODIUM 5000 UNITS: 5000 INJECTION INTRAVENOUS; SUBCUTANEOUS at 21:46

## 2023-02-11 RX ADMIN — BUDESONIDE AND FORMOTEROL FUMARATE DIHYDRATE 1 PUFF: 160; 4.5 AEROSOL RESPIRATORY (INHALATION) at 17:03

## 2023-02-11 RX ADMIN — INSULIN HUMAN 20 UNITS: 500 INJECTION, SOLUTION SUBCUTANEOUS at 17:02

## 2023-02-11 RX ADMIN — METOPROLOL TARTRATE 50 MG: 50 TABLET, FILM COATED ORAL at 11:39

## 2023-02-11 RX ADMIN — FUROSEMIDE 20 MG: 20 TABLET ORAL at 08:12

## 2023-02-11 RX ADMIN — METOPROLOL TARTRATE 50 MG: 50 TABLET, FILM COATED ORAL at 21:46

## 2023-02-11 RX ADMIN — AZITHROMYCIN 500 MG: 500 INJECTION, POWDER, LYOPHILIZED, FOR SOLUTION INTRAVENOUS at 03:39

## 2023-02-11 RX ADMIN — GABAPENTIN 100 MG: 100 CAPSULE ORAL at 08:12

## 2023-02-11 RX ADMIN — ASPIRIN 81 MG: 81 TABLET, COATED ORAL at 08:12

## 2023-02-11 RX ADMIN — PREDNISONE 4 MG: 1 TABLET ORAL at 08:12

## 2023-02-11 RX ADMIN — GABAPENTIN 100 MG: 100 CAPSULE ORAL at 17:02

## 2023-02-11 RX ADMIN — HEPARIN SODIUM 5000 UNITS: 5000 INJECTION INTRAVENOUS; SUBCUTANEOUS at 13:36

## 2023-02-11 RX ADMIN — SODIUM CHLORIDE, SODIUM GLUCONATE, SODIUM ACETATE, POTASSIUM CHLORIDE, MAGNESIUM CHLORIDE, SODIUM PHOSPHATE, DIBASIC, AND POTASSIUM PHOSPHATE 75 ML/HR: .53; .5; .37; .037; .03; .012; .00082 INJECTION, SOLUTION INTRAVENOUS at 21:44

## 2023-02-11 RX ADMIN — SODIUM CHLORIDE 1000 ML: 0.9 INJECTION, SOLUTION INTRAVENOUS at 01:27

## 2023-02-11 RX ADMIN — CEFTRIAXONE 2000 MG: 2 INJECTION, SOLUTION INTRAVENOUS at 02:43

## 2023-02-11 RX ADMIN — SODIUM CHLORIDE, SODIUM GLUCONATE, SODIUM ACETATE, POTASSIUM CHLORIDE, MAGNESIUM CHLORIDE, SODIUM PHOSPHATE, DIBASIC, AND POTASSIUM PHOSPHATE 75 ML/HR: .53; .5; .37; .037; .03; .012; .00082 INJECTION, SOLUTION INTRAVENOUS at 08:09

## 2023-02-11 RX ADMIN — DILTIAZEM HYDROCHLORIDE 480 MG: 240 CAPSULE, COATED, EXTENDED RELEASE ORAL at 08:12

## 2023-02-11 RX ADMIN — BUDESONIDE AND FORMOTEROL FUMARATE DIHYDRATE 1 PUFF: 160; 4.5 AEROSOL RESPIRATORY (INHALATION) at 08:13

## 2023-02-11 RX ADMIN — ATORVASTATIN CALCIUM 40 MG: 40 TABLET, FILM COATED ORAL at 17:02

## 2023-02-11 RX ADMIN — SODIUM CHLORIDE, SODIUM GLUCONATE, SODIUM ACETATE, POTASSIUM CHLORIDE, MAGNESIUM CHLORIDE, SODIUM PHOSPHATE, DIBASIC, AND POTASSIUM PHOSPHATE 500 ML: .53; .5; .37; .037; .03; .012; .00082 INJECTION, SOLUTION INTRAVENOUS at 05:18

## 2023-02-11 NOTE — ASSESSMENT & PLAN NOTE
· Continue diltiazem losartan and will add metoprolol given tachycardia and elevated blood pressures

## 2023-02-11 NOTE — ASSESSMENT & PLAN NOTE
Suspect uti by innumerable bacteria however only 2-4 wbcs/hpf    Pt does have urinary urgency however (pt describes this at first as incontinence but notes awareness of it without groin numbness or bowel incontinence and just has difficulty getting to restroom in timely manner)  -iv rocephin as above for sepsis  -add on ucx

## 2023-02-11 NOTE — Clinical Note
Case was discussed with ROBERTO and the patient's admission status was agreed to be Admission Status: inpatient status to the service of

## 2023-02-11 NOTE — ASSESSMENT & PLAN NOTE
B/l leg numbness which is more chronic in nature w/recent worsening of b/l leg weakness since fall approx 1 mo ago (pt describes this as 2 weeks ago but was evaluated on 1/13/23 at this facility) now w/worsening weakness in lower legs b/l by pt report x~ 2 weeks  Check ct imaging as above for weakness as likely some component of chronic spinal stenosis

## 2023-02-11 NOTE — ED ATTENDING ATTESTATION
2/10/2023  I, Maria Esther Lares MD, saw and evaluated the patient  I have discussed the patient with the resident/non-physician practitioner and agree with the resident's/non-physician practitioner's findings, Plan of Care, and MDM as documented in the resident's/non-physician practitioner's note, except where noted  All available labs and Radiology studies were reviewed  I was present for key portions of any procedure(s) performed by the resident/non-physician practitioner and I was immediately available to provide assistance  At this point I agree with the current assessment done in the Emergency Department  I have conducted an independent evaluation of this patient a history and physical is as follows:    56 yo F with DM2, currently residing in a homeless shelter, c/o onset of urinary difficulty, specifically urgency and frequency with some incontinence, no dysuria, no hematuria, and then c/o chills, without fever  She denies abdominal pain  VS notable for elevated blood pressure, mild tachycardia, afebrile  NAD  Nontoxic appearing  Chest clear  Abd S/NT  BLE edema  ED workup for UTI, occult pneumonia, metabolic disturbances, DKA, replete fluids, abx as indicated  CXR is convincing for RLL infiltrate  Will treat and she does have SIRS markers  Admit           ED Course         Critical Care Time  Procedures

## 2023-02-11 NOTE — PROGRESS NOTES
Benign hypertension  Blood pressures remain elevated  Also tachycardic  On diltiazem 420 mg daily  We will start metoprolol heart rate 50 mg twice daily and monitor

## 2023-02-11 NOTE — CONSULTS
Consultation - Neurology   Riley Juraod 57 y o  female MRN: [id number]  Unit/Bed#: [address] 206-01 Encounter: [id number]      Physician Requesting Consult: Afshan Shultz DO  Inpatient consult to Neurology  Consult performed by: Deyvi Tiwari MD  Consult ordered by: Rosie Carolina PA-C        Reason for Consult / Principal Problem: eval for weakness        Assessment: Gait dysfunction in setting of deconditioning/diabetic neuropathy/obesity/chronic LE swelling/polymyalgia rheumatica and now possible sepsis adding to baseline deconditioning  Poor sleep likely contributing as well  Low clinical suspicion for acute cva  No clear clinical evidence of myelopathy or radiculopathy  More pain in proximal rle compared to left with rt sided thoracolumbar paraspinal pain  Pt suspecting since her fall mid Jan which makes sense since pain is worse on one side  Cannot exclude myopathy however likely deconditioning playing a role as well as she is not getting much activity  Clinically not suspicious for diabetic amyotrophy  Plan:  Aqua k pad applied to upper rt thigh/groin region  Continue evaluating for toxic/metabolic derangements  Follow panculture  PT/OT  outpt titration of her CPAP  Has been a few years  Muscles likely tight at baseline given inactivity  I've recommended that she ask PT to give her exercises for home stretches as well to do 2-3x/day  HPI: Riley Jurado is a 57 y o  female who admitted to [address] on [address] level sepsis  Etiology unclear pneumonia and UTI in the differential   Patient has multiple vascular risk factors including diabetes hypertension morbid obesity and polymyalgia rheumatica on prednisone daily  Currently homeless  Patient had lactic acid now resolved  Has leukocytosis tachycardia    She is a generalized weakness for the last couple weeks and concern by intra medicine team that she has slightly weaker right upper and lower extremities compared to the left side  Typically right hand  in the right dorsiflexion and plantarflexion appears to be weaker per medicine team patient  She is on IV Rocephin   panculture pending  Also having urinary urgency some shortness of breath, dry nonproductive cough  She had fallen 2 weeks ago down the stairs and discharge in the ED generally 13th of this year  She has history of chronic bilateral lower extremity numbness also generalized weakness especially lower extremities is in more recent  Shortly after this fall middle of January she was evicted from her home and has been homeless  Conversation with patient states states that she feels her lower extremities have been getting weaker for the last few months  She has some chronic low-grade pain in her arms and legs with worsening in legs with movement  The day she fell in mid Jan she says she had actually gone outside in the cold briefly and when she came back in and was trying to walk up the stairs when both legs give out below her knees with no warning  She mentions that cold can worsen her polymyalgia rheumatica symptoms  Denied any pain from buttocks shooting down or back shooting down that she can remember  Says that both lower extremities have given out in the past left lower extremity gives out more often than the right lower extremity both from the knees down she says but the mid-January episode was the first time she remembers both legs going out  Usually when one leg goes out she uses a walker she is able to maintain herself from falling although she has had a few falls  She has a high pain tolerance in general   Also has some intermittent pain in her back  Since her fall she has been having more pain in the right lower back and right upper thigh region superimposed on her baseline pain  She states at baseline also her rle is stronger than lle and no hx of any foot drop  She mentions chills and mild cough for several weeks   Sob when walking short distances like to the door for the past two weeks  She has chronic swelling in both LEs however no recent change  She states she uses a walker at home, cane on a good day  ROS:  Per 12 point review in addition to hpi, trouble falling and staying asleep, tension HA's 1-2x/week and migraines 2-3x/week (mentions prior to topamax headaches in general were daily), nocturia, swelling in LEs, rest negative      Historical Information   Past Medical History:   Diagnosis Date   • Anemia    • Arthritis    • Diabetes mellitus ([place] [address] )    • Dyspnea on exertion    • Hyperlipidemia    • Hypertension    • Legally blind 2010   • Mild intermittent asthma with exacerbation 8/4/2018   • Miscarriage     x 3   • Polymyalgia rheumatica ([place] [address] ) 11/24/2021   • Seizures ([place] [address] )    • Sickle cell trait (HCC)    • Sleep apnea    • Stage 3a chronic kidney disease ([place] [address] ) 5/19/2022   • Thyroid nodule 3/6/2020     Past Surgical History:   Procedure Laterality Date   • CATARACT EXTRACTION Bilateral     august and september   • EYE SURGERY     • HYSTERECTOMY      37   • OOPHORECTOMY Left     37   • DE LIGATION/BIOPSY TEMPORAL ARTERY Bilateral 10/17/2019    Procedure: BIOPSY ARTERY TEMPORAL;  Surgeon: Sapphire Salazar DO;  [facility];  Service: Vascular   • US GUIDED THYROID BIOPSY  5/13/2020     Social History   Social History     Tobacco Use   Smoking Status Never   Smokeless Tobacco Never     Social History     Substance and Sexual Activity   Alcohol Use Never   • Alcohol/week: 0 0 standard drinks     Social History     Substance and Sexual Activity   Drug Use No       Family History: non-contributory      Meds/Allergies     No Known Allergies    all current active meds have been reviewed    Objective   Vitals:Blood pressure (!) 188/100, pulse 100, temperature 98 °F (36 7 °C), resp  rate 17, height 5' 8" (1 727 m), weight (!) 153 kg (338 lb 3 oz), SpO2 96 %, not currently breastfeeding  ,Body mass index is 51 42 kg/m²      Intake/Output Summary (Last 24 hours) at 2/11/2023 1435  Last data filed at 2/11/2023 1301  Gross per 24 hour   Intake 1900 ml   Output --   Net 1900 ml           Physical Exam:   Physical Exam General appearance: alert, appears stated age and cooperative  Head: Normocephalic, without obvious abnormality, atraumatic:    Extremities: no visible deformities, atraumatic  Musculoskeletal: Tenderness upon palpation of bilateral UE/LES proximal>distal  Pain in lue upper arm and forearm symmetrical  In rue more pain proximally in upper arm compared to forearm  bilat LEs more pain upon palpation of anterior/lateral right hip moreso than the left  Also tenderness to palpate rt sided thoracolumbar paraspinal muscles  Neurologic:  Cognitive:  Mental status: Alert, orientedX3, thought content appropriate  No expressive/receptive aphasia  CN 2-12: intact  Motor: normal tone ue's  With LEs she tenses up with activation bilat otherwise normal  5 power ue bilat  Rt hip flexion 2+/3- as sometimes she can slightl get off bed other times cannot  With ilopsoas isolation rt side is 4 left side is 5  Left hip flexion 4+ rest of extremity is 5  Sensory: decreased light touch, vibration, proprioception distal LE bilat  Cerebellar: finger to nose intact bilat ue  Unable to do heel to shin bilat incontext of pain limitation/body habitus  DTR's: 1 ue bilat, areflexic le bilat  Plantars: downgoing bilat        Lab Results: I have personally reviewed pertinent reports        Imaging Studies: I have personally reviewed pertinent films in PACS    EKG, Pathology, and Other Studies: I have personally reviewed pertinent films in PACS

## 2023-02-11 NOTE — CASE MANAGEMENT
Case Management Assessment & Discharge Planning Note    Patient name Claribel Harp  Location [facility] 2 /[facility]* MRN [id number]  : 1960 Date 2023       Current Admission Date: 2023  Current Admission Diagnosis:Sepsis [facility])   Patient Active Problem List    Diagnosis Date Noted   • Sepsis ([facility] ) 2023   • Abnormal urinalysis 2023   • Weakness 2023   • Intertrigo 2023   • Leg numbness 2023   • Unsteadiness on feet 2022   • Chronic migraine without aura, not intractable, without status migrainosus 2022   • Obstructive sleep apnea 2022   • Diabetic neuropathy ([facility] ) 2022   • Stage 3a chronic kidney disease ([facility] ) 2022   • Polymyalgia rheumatica ([place] [address] ) 2021   • Gait instability 2021   • Ulnar neuropathy of right upper extremity    • Blurry vision, bilateral 2021   • Depressed mood 10/08/2020   • Essential hypertension 09/15/2020   • Diabetes mellitus ([facility] ) 2020   • Chronic migraine without aura without status migrainosus, not intractable 2020   • Hypersomnia 2020   • Migraine without aura and without status migrainosus, not intractable 2020   • Cerebral aneurysm without rupture 2020   • History of absence seizures 2020   • Thyroid nodule 2020   • Aneurysm ([facility] ) 2020   • Type 2 diabetes mellitus with hyperglycemia, with long-term current use of insulin ([place] [address] ) 2020   • Left cavernous carotid aneurysm 02/10/2020   • Polyneuropathy associated with underlying disease ([facility] ) 2020   • PMR (polymyalgia rheumatica) ([facility] ) 2020   • Thrombocytosis 2020   • Morbid obesity ([facility] ) 2019   • Other inflammatory and immune myopathies, not elsewhere classified 2019   • Transaminitis 2019   • Frequent headaches 2019   • Type 2 diabetes mellitus with microalbuminuria, with long-term current use of insulin ([facility] ) 2019   • Mild intermittent asthma with exacerbation 08/04/2018   • Orthostatic hypotension 06/25/2018   • Lung nodules 03/22/2018   • Exertional dyspnea 02/13/2018   • Enlarged pulmonary artery (HCC) 02/13/2018   • Aortic dilatation (HCC) 02/13/2018   • 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, with long-term current use of insulin    • Benign hypertension    • Seizures ([facility] )    • Obstructive sleep apnea (adult) (pediatric) 12/18/2017   • Prolonged QT interval 12/18/2017   • Decreased pulses in feet 12/11/2017   • Diabetes mellitus type 2 with complications, uncontrolled 09/08/2015   • Microalbuminuria 09/08/2015   • Vitamin D deficiency 06/06/2014   • Visual impairment in both eyes 12/06/2012   • Anemia 03/20/2012   • Hyperlipidemia 03/20/2012   • Class 3 severe obesity with serious comorbidity and body mass index (BMI) of 50 0 to 59 9 in adult [facility]) 03/20/2012   • Neuropathy of hand, right 03/20/2012      LOS (days): 0  Geometric Mean LOS (GMLOS) (days):   Days to GMLOS:     OBJECTIVE:    Risk of Unplanned Readmission Score: 18 06         Current admission status: Inpatient       Preferred Pharmacy:   [facility], [address] [address]  Phone: [phone] Fax: [phone]    [facility] ([address] [facility]) - Srinivas Ventura  [address] [facility]  [facility]  [address] [place] [id number]  Phone: [phone] Fax: [phone]    Primary Care Provider: Stoney Valenzuela MD    Primary Insurance: [employer] [address]  Secondary Insurance:     ASSESSMENT:  [address], [address] Representative - Son   Primary Phone: [phone] (Mobile)  Home Phone: [phone]               Advance Directives  Does patient have a [address]?: No  Was patient offered paperwork?: Yes (provided 5 Wishes pamphlet)  Does patient have Advance Directives?: No  Was patient offered paperwork?: Yes (provided 5 Wishes pamphlet)  Primary Contact: Catracho Shaw [phone]         Readmission Root Cause  30 Day Readmission: No    Patient Information  Admitted from[de-identified] Other (comment) ([address] [facility])  Mental Status: Alert  During Assessment patient was accompanied by: Son  Assessment information provided by[de-identified] Patient  Primary Caregiver: Self  Support Systems: Son  [place] of Residence: [address] [facility]  Type of Current Residence: Other (Comment) ([address] [facility])  In the last 12 months, was there a time when you were not able to pay the mortgage or rent on time?: Yes  In the last 12 months, was there a time when you did not have a steady place to sleep or slept in a shelter (including now)?: Yes  Homeless/housing insecurity resource given?: Yes (Pt in contact w/ 211   CM provided Aid Program list, [facility] list, Resource Alert System flyer & 2-1-1 flyer)  Living Arrangements: Other (Comment) (currently at [address] [facility])  Is patient a ?: No    Activities of Daily Living Prior to Admission  Functional Status: Independent  Completes ADLs independently?: Yes  Ambulates independently?: Yes  Does patient use assisted devices?: Yes  Assisted Devices (DME) used: Maricela Harada  Does patient currently own DME?: Yes  What DME does the patient currently own?: [employer]  Does patient have a history of HHC?: No  Does patient currently have [facility]?: No         Patient Information Continued  Income Source: SSI/SSD  Does patient have prescription coverage?: Yes  Does patient receive dialysis treatments?: No  Does patient have a history of substance abuse?: No  Does patient have a history of Mental Health Diagnosis?: No         Means of Transportation  Means of Transport to [facility][de-identified] [employer] - Bus (has [place] [employer] pass $1/ride)  In the past 12 months, has lack of transportation kept you from medical appointments or from getting medications?: No  In the past 12 months, has lack of transportation kept you from meetings, work, or from getting things needed for daily living?: No  Was application for public transport provided?: N/A        DISCHARGE DETAILS:    Discharge planning discussed with[de-identified] son & patient        CM contacted family/caregiver?: Yes             Contacts  Patient Contacts: Hi swenson [phone]  Relationship to Patient[de-identified] Family  Contact Method: In Person  Reason/Outcome: Continuity of Care, Emergency Contact, Discharge [address]         Is the patient interested in [facility] at discharge?: No    DME Referral Provided  Referral made for DME?: No    Other Referral/Resources/Interventions Provided:  Referral Comments: CM consult for homelessness  Pt in contact w/ 2-1-1  CM provided Aid Program list, [employer] list, Resource Alert System flyer & 2-1-1 flyer  Currently staying at [address]              Additional Comments: Pt in contact w/ 211   CM provided Aid Program list, [employer] list, Resource Alert System flyer & 2-1-1 flyer Currently staying at [facility]

## 2023-02-11 NOTE — ASSESSMENT & PLAN NOTE
Reported by pt as generalized in setting of PMR, sepsis, however right  strength weaker than left although no overt pronator drift on exa, Also dorsi/plantar flexion in right leg worse than left although confounded given b/l leg weakness/numbness which may have component of lumbar spinal stenosis    Given occurrence of worsening generalized weakness x 2 weeks, unclear duration of s/sx onset  -stat ct head, c spine and lumbar  -as s/sx >48h defer stroke pathway but will consult neuro for evaluation and consideration of MRI  -consult pt/ot

## 2023-02-11 NOTE — H&P
[facility]  H&P- Shorty Sandoval 1960, 57 y o  female MRN: [id number]  Unit/Bed#: [place] [address] -01 Encounter: [id number]  Primary Care Provider: Charles Lira MD   Date and time admitted to hospital: 2/11/2023 12:11 AM    * Sepsis [facility])  Assessment & Plan  poa by tachycardia/leucocytosis 2* uti vs pna   (did have some schneider w/climbing a hill/easy fatigability but also has new urinary frequency  Lactic acidosis now resolved s/p 1 5L IVF bolus in ed  -wet read cxr in ed was concerning for possible RLL pna limited by angle and under inspiration  -procalcitonin pending  -f/u formal read, continue iv rocephin  -monitor cbc vs, bcx ucx    Weakness  Assessment & Plan  Reported by pt as generalized in setting of PMR, sepsis, however right  strength weaker than left although no overt pronator drift on exa, Also dorsi/plantar flexion in right leg worse than left although confounded given b/l leg weakness/numbness which may have component of lumbar spinal stenosis  Given occurrence of worsening generalized weakness x 2 weeks, unclear duration of s/sx onset  -stat ct head, c spine and lumbar  -as s/sx >48h defer stroke pathway but will consult neuro for evaluation and consideration of MRI  -consult pt/ot    Leg numbness  Assessment & Plan  B/l leg numbness which is more chronic in nature w/recent worsening of b/l leg weakness since fall approx 1 mo ago (pt describes this as 2 weeks ago but was evaluated on 1/13/23 at this facility) now w/worsening weakness in lower legs b/l by pt report x~ 2 weeks  Check ct imaging as above for weakness as likely some component of chronic spinal stenosis        Intertrigo  Assessment & Plan  W/skin breakdown Of right abd pannus Consult wcx nystatin powder    Abnormal urinalysis  Assessment & Plan  Suspect uti by innumerable bacteria however only 2-4 wbcs/hpf    Pt does have urinary urgency however (pt describes this at first as incontinence but notes awareness of it without groin numbness or bowel incontinence and just has difficulty getting to restroom in timely manner)  -iv rocephin as above for sepsis  -add on ucx    Polymyalgia rheumatica ([facility] [address] )  Assessment & Plan  On prednisone 4mg po daily continue op regimen    Diabetes mellitus [facility])  Assessment & Plan  Lab Results   Component Value Date    HGBA1C 8 4 (A) 08/25/2022       Recent Labs     02/09/23  0647   POCGLU 297*       Blood Sugar Average: Last 72 hrs: On metformin and human R U-500 80 units every morning and 20 to 40 units every afternoon before dinner  Continue 80 iu qam and 20 iu qpm w/ssi/poc bs    Morbid obesity ([facility] [address] )  Assessment & Plan  bmi 51 noted    Benign hypertension  Assessment & Plan  Continue Cardizem Cozaar Lasix      VTE Prophylaxis: Enoxaparin (Lovenox)  / sequential compression device   Code Status: fc  POLST: There is no POLST form on file for this patient (pre-hospital)  Discussion with family:     Anticipated Length of Stay:  Patient will be admitted on an Inpatient basis with an anticipated length of stay of  Greater than 2 midnights  Justification for Hospital Stay: sepsis    Total Time for Visit, including Counseling / Coordination of Care: 45 minutes  Greater than 50% of this total time spent on direct patient counseling and coordination of care  Chief Complaint:   "I peed on myself"    History of Present Illness:    Riley Jurado is a 57 y o  female who presents with pmh of PMR, htn IDDM, obesity w/bmi of 50 recent homelessness coming to hospital for urinary issues/worsening weakness and sob  Pt notes she had a fall about 2 weeks ago (appears to 1/13/23 by emr) after she fell down 6 stairs  She was evaluated and cleared for d/c in ed w/o any focal neurodeficits  She does note chronic b/l leg numbness which predated this but notes since then progressive weakness in both legs as well as generalized weakness  She notes decreased appetite recently as well as schneider  Unfortunately not long after that fall she was evicted from her home and is currently experiencing homelessness  She notes a dry nonproductive cough, but no sore throat congestion pnd  She has been suffering from urinary urgency and has difficulty getting to the bathroom before she urinates herself  No groin numbness and she can tell this is going to happen but cannot get there in a timely manner  No bowel incontinence  She came to ed for this and concern was raised for possible pna   cxr was obtained and admission was requested  On exam repeated right  strength deficit was noted compared to left as well as mild weakness worse than left w/dorsi/plantar flexion of right foot which pt was not aware of  We will admit patient for weakness, sepsis 2* uti vs pna  Review of Systems:    Review of Systems   Constitutional: Positive for appetite change  Negative for chills and fever  Respiratory: Positive for shortness of breath  Cardiovascular: Negative for chest pain  Gastrointestinal: Negative for abdominal distention, abdominal pain, diarrhea, nausea and vomiting  Genitourinary: Positive for urgency  Musculoskeletal: Positive for back pain and gait problem  Neurological: Positive for weakness and numbness  All other systems reviewed and are negative        Past Medical and Surgical History:     Past Medical History:   Diagnosis Date   • Anemia    • Arthritis    • Diabetes mellitus ([facility] )    • Dyspnea on exertion    • Hyperlipidemia    • Hypertension    • Legally blind 2010   • Mild intermittent asthma with exacerbation 8/4/2018   • Miscarriage     x 3   • Polymyalgia rhe[facility]a ([facility] ) 11/24/2021   • Seizures ([facility] )    • Sickle cell trait ([facility] )    • Sleep apnea    • Stage 3a chronic kidney disease ([facility] ) 5/19/2022   • Thyroid nodule 3/6/2020       Past Surgical History:   Procedure Laterality Date   • CATARACT EXTRACTION Bilateral     august and september   • EYE SURGERY     • HYSTERECTOMY 37   • OOPHORECTOMY Left     39   • ND LIGATION/BIOPSY TEMPORAL ARTERY Bilateral 10/17/2019    Procedure: BIOPSY ARTERY TEMPORAL;  Surgeon: James Madsen DO;  Location: [facility];  Service: Vascular   • US GUIDED THYROID BIOPSY  5/13/2020       Meds/Allergies:    Prior to Admission medications    Medication Sig Start Date End Date Taking? Authorizing Provider   albuterol (Proventil HFA) 90 mcg/act inhaler Inhale 2 puffs every 6 (six) hours as needed for wheezing for up to 14 days For another 24 hours use every 4 hours standing 2/9/23 2/23/23 Yes Abisai Chapa PA-C   aspirin (ECOTRIN LOW STRENGTH) 81 mg EC tablet Take 1 tablet (81 mg total) by mouth daily for 14 days 2/9/23 2/23/23 Yes Beka Purvis PA-C   atorvastatin (LIPITOR) 40 mg tablet Take 1 tablet (40 mg total) by mouth daily for 14 days 2/9/23 2/23/23 Yes Abisai Chapa PA-C   budesonide-formoterol (Symbicort) 160-4 5 mcg/act inhaler Inhale 1 puff 2 (two) times a day for 14 days Rinse mouth after use   2/9/23 2/23/23 Yes Beka Purvis PA-C   Cholecalciferol (HM Vitamin D3) 100 MCG (4000 UT) CAPS 4000 units daily 5/21/20  Yes Eden Jacinto PA-C   Diclofenac Sodium POWD  5/6/20  Yes Historical Provider, MD   diltiazem (CARDIZEM CD) 240 mg 24 hr capsule Take 2 capsules (480 mg total) by mouth daily for 14 days 2/9/23 2/23/23 Yes Beka Purvis PA-C   ferrous sulfate 324 (65 Fe) mg Take 1 tablet (324 mg total) by mouth every other day  Patient taking differently: Take 324 mg by mouth daily before breakfast 12/20/19  Yes CONCETTA [place]   furosemide (LASIX) 20 mg tablet Take 1 tablet (20 mg total) by mouth daily for 14 days 2/9/23 2/23/23 Yes Beka Purvis PA-C   gabapentin (NEURONTIN) 100 mg capsule Take 1 capsule in am and 2-3 capsules at night 11/9/21  Yes Anita Doty PA-C   Insulin Pen Needle (BD Pen Needle Tita U/F) 32G X 4 MM MISC Use 2 (two) times a day 3/18/21  Yes Bhaskar Francisco MD   Insulin Regular Human, Conc, (HumuLIN R U-500 KwikPen) 500 units/mL CONCENTRATED U-500 injection pen INJECT SUBCUTANEOUSLY 80   UNITS BEFORE BREAKFAST AND  20 UNITS BEFORE DINNER 2/9/23  Yes Nagi Sifuentes PA-C   losartan (COZAAR) 100 MG tablet Take 1 tablet (100 mg total) by mouth daily 2/9/23  Yes Nagi Sifuentes PA-C   magnesium oxide (MAG-OX) 400 mg tablet magnesium oxide 400 mg (241 3 mg magnesium) tablet   TAKE 1 TABLET BY MOUTH EVERY DAY   Yes Historical Provider, MD   metFORMIN (GLUCOPHAGE-XR) 500 mg 24 hr tablet Take 2 tablets (1,000 mg total) by mouth 2 (two) times a day with meals 2/9/23  Yes Abisai Chapa PA-C   predniSONE 1 mg tablet Combine to take 9 mg once daily  Decrease by 1 mg every 4 weeks  7/28/21  Yes Dani Van MD   Riboflavin 400 MG TABS Take 1 tablet (400 mg total) by mouth daily 3/6/20  Yes Amalia Anders DO   topiramate (TOPAMAX) 100 mg tablet TAKE 1 TABLET BY MOUTH AT  BEDTIME 7/22/21  Yes Prashanth Benjamin PA-C   Blood Glucose Monitoring Suppl (OneTouch Verio) w/Device KIT Use 3 (three) times a day for 14 days 2/9/23 2/23/23  Nagi Sifuentes PA-C   Blood Pressure Monitor KIT Check blood pressures BID 6/26/18   Ramón Díaz DO   Continuous Blood Gluc  (FreeStyle Batres 2 [place]) ERIC Use 1 each 4 (four) times a day 9/30/22   Michi Crisostomo MD   Continuous Blood Gluc Sensor (FreeStyle Denisa 2 Sensor) MISC Use 1 each every 14 (fourteen) days 9/30/22   Michi Crisostomo MD   Lancets (OneTouch Delica Plus [id number]) MISC Use 1 each 3 (three) times a day  Patient not taking: Reported on 8/25/2022 10/20/21   Michi Crisostomo MD   predniSONE 1 mg tablet Combine to take 5-6 mg once daily  Patient not taking: Reported on 2/11/2023 10/17/22   Dani Van MD   urea 40 % LOTN lotion urea 40 % lotion   APPLY TO THE AFFECTED AREA(S) BY TOPICAL ROUTE 2 TIMES PER DAY  Patient not taking: Reported on 2/11/2023    Historical Provider, MD MALDONADO have reviewed home medications with patient personally      Allergies: No Known Allergies    Social History:     Marital Status: /Civil Union   Occupation:   Patient Pre-hospital Living Situation:   Patient Pre-hospital Level of Mobility:   Patient Pre-hospital Diet Restrictions:   Substance Use History:   Social History     Substance and Sexual Activity   Alcohol Use Never   • Alcohol/week: 0 0 standard drinks     Social History     Tobacco Use   Smoking Status Never   Smokeless Tobacco Never     Social History     Substance and Sexual Activity   Drug Use No       Family History:    Family History   Problem Relation Age of Onset   • Heart attack Mother    • Heart disease Mother    • Hypertension Mother    • No Known Problems Father    • Heart disease Sister    • No Known Problems Brother    • No Known Problems Son    • No Known Problems Daughter    • Heart attack Maternal Grandmother    • Heart disease Maternal Grandmother    • Diabetes Other    • Heart attack Other    • No Known Problems Sister    • No Known Problems Sister    • No Known Problems Paternal Aunt    • No Known Problems Son    • Cancer Neg Hx    • Stroke Neg Hx        Physical Exam:     Vitals:   Blood Pressure: 170/85 (02/11/23 0546)  Pulse: (!) 111 (02/11/23 0546)  Temperature: 98 3 °F (36 8 °C) (02/11/23 0546)  Temp Source: Oral (02/10/23 3654)  Respirations: 18 (02/11/23 0546)  Height: 5' 8" (172 7 cm) (02/11/23 0530)  Weight - Scale: (!) 153 kg (338 lb 3 oz) (02/11/23 0530)  SpO2: 97 % (02/11/23 0546)    Physical Exam  Vitals reviewed  Constitutional:       General: She is not in acute distress  Appearance: She is not ill-appearing, toxic-appearing or diaphoretic  HENT:      Head: Normocephalic and atraumatic  Right Ear: External ear normal       Left Ear: External ear normal       Nose: Nose normal    Eyes:      Extraocular Movements: Extraocular movements intact  Cardiovascular:      Rate and Rhythm: Normal rate and regular rhythm  Heart sounds: No murmur heard  No friction rub  No gallop  Pulmonary:      Effort: No respiratory distress  Breath sounds: No wheezing, rhonchi or rales  Abdominal:      General: There is no distension  Palpations: Abdomen is soft  There is no mass  Tenderness: There is no abdominal tenderness  There is no guarding or rebound  Hernia: No hernia is present  Musculoskeletal:      Right lower leg: No edema  Left lower leg: No edema  Skin:     General: Skin is warm and dry  Neurological:      Mental Status: She is alert  Mental status is at baseline  Psychiatric:         Mood and Affect: Mood normal            Additional Data:     Lab Results: I have personally reviewed pertinent reports  Results from last 7 days   Lab Units 02/11/23  0106   WBC Thousand/uL 13 97*   HEMOGLOBIN g/dL 10 2*   HEMATOCRIT % 34 8   PLATELETS Thousands/uL 363   NEUTROS PCT % 77*   LYMPHS PCT % 13*   MONOS PCT % 7   EOS PCT % 1     Results from last 7 days   Lab Units 02/11/23  0106   SODIUM mmol/L 142   POTASSIUM mmol/L 3 7   CHLORIDE mmol/L 108   CO2 mmol/L 26   BUN mg/dL 17   CREATININE mg/dL 1 30   ANION GAP mmol/L 8   CALCIUM mg/dL 9 1   ALBUMIN g/dL 3 4*   TOTAL BILIRUBIN mg/dL 0 42   ALK PHOS U/L 114*   ALT U/L 14   AST U/L 13   GLUCOSE RANDOM mg/dL 142*     Results from last 7 days   Lab Units 02/11/23  0106   INR  1 05     Results from last 7 days   Lab Units 02/09/23  0647   POC GLUCOSE mg/dl 297*         Results from last 7 days   Lab Units 02/11/23  0516 02/11/23  0310 02/11/23  0106   LACTIC ACID mmol/L 1 5 2 1* 2 4*   PROCALCITONIN ng/ml  --   --  0 08       Imaging: I have personally reviewed pertinent reports  CT head wo contrast   Final Result by Grace Kennedy DO (02/11 0518)      No acute intracranial abnormality  Workstation performed: [id number]         CT spine lumbar wo contrast   Final Result by Grace Kennedy DO (02/11 7689)      No acute osseous abnormality        Multilevel degenerative changes, as described, with moderate to severe degenerative changes at L4-L5 and L5-S1               Workstation performed: [id number]         CT spine cervical wo contrast   Final Result by Martha Carolina DO (02/11 0522)      No cervical spine fracture or traumatic malalignment  Workstation performed: [id number]         XR chest portable   ED Interpretation by Aki Smith DO (02/11 0153)   Questionable right lower lung field consolidation versus atelectasis          EKG, Pathology, and Other Studies Reviewed on Admission:   · EKG:     Allscripts / Epic Records Reviewed: Yes     ** Please Note: This note has been constructed using a voice recognition system   **

## 2023-02-11 NOTE — ASSESSMENT & PLAN NOTE
Lab Results   Component Value Date    HGBA1C 8 4 (A) 08/25/2022       Recent Labs     02/09/23  0647   POCGLU 297*       Blood Sugar Average: Last 72 hrs:   On metformin and human R U-500 80 units every morning and 20 to 40 units every afternoon before dinner  Continue 80 iu qam and 20 iu qpm w/ssi/poc bs

## 2023-02-11 NOTE — ASSESSMENT & PLAN NOTE
poa by tachycardia/leucocytosis 2* uti vs pna   (did have some schneider w/climbing a hill/easy fatigability but also has new urinary frequency  Lactic acidosis now resolved s/p 1 5L IVF bolus in ed  -wet read cxr in ed was concerning for possible RLL pna limited by angle and under inspiration  -procalcitonin pending  -f/u formal read, continue iv rocephin  -monitor cbc vs, bcx ucx

## 2023-02-11 NOTE — ED NOTES
Patient instructed to keep arm straight in order for NS bolus to go through IV       [place] Andrei, RN  02/11/23 2086

## 2023-02-11 NOTE — ED PROVIDER NOTES
History  Chief Complaint   Patient presents with   • Medical Problem     Pt reports today started with chills, shakes and b/l leg pain      Patient is a 20-year-old female presenting for evaluation of urinary incontinence  She is a poor historian  She states that over the last day or so she has had several episodes of urinary urgency, and inability to make it to the bathroom thus causing her to be incontinent of her urine  She is denying any dysuria or suprapubic/flank pain  She denies fevers but does have chills  She has had some nonproductive coughing without any difficulty breathing, shortness of breath, or chest pain  She denies any nausea, vomiting, diarrhea, or abdominal pain  She denies any back pain, back injuries, changes in sensation, and states that she has remained ambulatory without issue  She denies any known sick contacts  She states that her son was concerned with her symptoms and recommended she come to the emergency department for evaluation  Prior to Admission Medications   Prescriptions Last Dose Informant Patient Reported? Taking?    Blood Glucose Monitoring Suppl (OneTouch Verio) w/Device KIT   No No   Sig: Use 3 (three) times a day for 14 days   Blood Pressure Monitor KIT   No No   Sig: Check blood pressures BID   Cholecalciferol (HM Vitamin D3) 100 MCG (4000 UT) CAPS   No No   Si units daily   Continuous Blood Gluc  (FreeStyle Denisa 2 Mcloud) ERIC   No No   Sig: Use 1 each 4 (four) times a day   Continuous Blood Gluc Sensor (FreeStyle Denisa 2 Sensor) MISC   No No   Sig: Use 1 each every 14 (fourteen) days   Diclofenac Sodium POWD   Yes No   Insulin Pen Needle (BD Pen Needle Tita U/F) 32G X 4 MM MISC   No No   Sig: Use 2 (two) times a day   Insulin Regular Human, Conc, (HumuLIN R U-500 KwikPen) 500 units/mL CONCENTRATED U-500 injection pen   No No   Sig: INJECT SUBCUTANEOUSLY 80   UNITS BEFORE BREAKFAST AND  20 UNITS BEFORE DINNER   Lancets (OneTouch Delica Plus [id number]) MISC   No No   Sig: Use 1 each 3 (three) times a day   Patient not taking: Reported on 8/25/2022   Riboflavin 400 MG TABS   No No   Sig: Take 1 tablet (400 mg total) by mouth daily   albuterol (Proventil HFA) 90 mcg/act inhaler   No No   Sig: Inhale 2 puffs every 6 (six) hours as needed for wheezing for up to 14 days For another 24 hours use every 4 hours standing   aspirin (ECOTRIN LOW STRENGTH) 81 mg EC tablet   No No   Sig: Take 1 tablet (81 mg total) by mouth daily for 14 days   atorvastatin (LIPITOR) 40 mg tablet   No No   Sig: Take 1 tablet (40 mg total) by mouth daily for 14 days   budesonide-formoterol (Symbicort) 160-4 5 mcg/act inhaler   No No   Sig: Inhale 1 puff 2 (two) times a day for 14 days Rinse mouth after use  diltiazem (CARDIZEM CD) 240 mg 24 hr capsule   No No   Sig: Take 2 capsules (480 mg total) by mouth daily for 14 days   ferrous sulfate 324 (65 Fe) mg   No No   Sig: Take 1 tablet (324 mg total) by mouth every other day   Patient taking differently: Take 324 mg by mouth daily before breakfast   furosemide (LASIX) 20 mg tablet   No No   Sig: Take 1 tablet (20 mg total) by mouth daily for 14 days   gabapentin (NEURONTIN) 100 mg capsule   No No   Sig: Take 1 capsule in am and 2-3 capsules at night   losartan (COZAAR) 100 MG tablet   No No   Sig: Take 1 tablet (100 mg total) by mouth daily   magnesium oxide (MAG-OX) 400 mg tablet   Yes No   Sig: magnesium oxide 400 mg (241 3 mg magnesium) tablet   TAKE 1 TABLET BY MOUTH EVERY DAY   metFORMIN (GLUCOPHAGE-XR) 500 mg 24 hr tablet   No No   Sig: Take 2 tablets (1,000 mg total) by mouth 2 (two) times a day with meals   predniSONE 1 mg tablet   No No   Sig: Combine to take 9 mg once daily  Decrease by 1 mg every 4 weeks  Patient not taking: Reported on 8/25/2022   predniSONE 1 mg tablet   No No   Sig: Combine to take 5-6 mg once daily     topiramate (TOPAMAX) 100 mg tablet   No No   Sig: TAKE 1 TABLET BY MOUTH AT  BEDTIME   urea 40 % LOTN lotion   Yes No   Sig: urea 40 % lotion   APPLY TO THE AFFECTED AREA(S) BY TOPICAL ROUTE 2 TIMES PER DAY      Facility-Administered Medications: None       Past Medical History:   Diagnosis Date   • Anemia    • Arthritis    • Diabetes mellitus ([place] [id number] )    • Dyspnea on exertion    • Hyperlipidemia    • Hypertension    • Legally blind 2010   • Mild intermittent asthma with exacerbation 8/4/2018   • Miscarriage     x 3   • Polymyalgia rheumatica ([place] [address] ) 11/24/2021   • Seizures ([place] [address] )    • Sickle cell trait ([place] [address] )    • Sleep apnea    • Stage 3a chronic kidney disease ([place] [address] ) 5/19/2022   • Thyroid nodule 3/6/2020       Past Surgical History:   Procedure Laterality Date   • CATARACT EXTRACTION Bilateral     august and september   • EYE SURGERY     • HYSTERECTOMY      37   • OOPHORECTOMY Left     37   • OH LIGATION/BIOPSY TEMPORAL ARTERY Bilateral 10/17/2019    Procedure: BIOPSY ARTERY TEMPORAL;  Surgeon: Tracey Torres DO;  Location: [facility];  Service: Vascular   • US GUIDED THYROID BIOPSY  5/13/2020       Family History   Problem Relation Age of Onset   • Heart attack Mother    • Heart disease Mother    • Hypertension Mother    • No Known Problems Father    • Heart disease Sister    • No Known Problems Brother    • No Known Problems Son    • No Known Problems Daughter    • Heart attack Maternal Grandmother    • Heart disease Maternal Grandmother    • Diabetes Other    • Heart attack Other    • No Known Problems Sister    • No Known Problems Sister    • No Known Problems Paternal Aunt    • No Known Problems Son    • Cancer Neg Hx    • Stroke Neg Hx      I have reviewed and agree with the history as documented  E-Cigarette/Vaping   • E-Cigarette Use Never User      E-Cigarette/Vaping Substances     Social History     Tobacco Use   • Smoking status: Never   • Smokeless tobacco: Never   Vaping Use   • Vaping Use: Never used   Substance Use Topics   • Alcohol use: Never     Alcohol/week: 0 0 standard drinks   • Drug use:  No Review of Systems   Constitutional: Positive for chills  Negative for fever  Respiratory: Positive for cough  Negative for shortness of breath  Cardiovascular: Negative for chest pain  Gastrointestinal: Negative for abdominal pain, constipation, diarrhea, nausea and vomiting  Genitourinary: Positive for enuresis, frequency and urgency  Negative for dysuria  Musculoskeletal: Negative for back pain and neck pain  Neurological: Negative for light-headedness and headaches  All other systems reviewed and are negative  Physical Exam  ED Triage Vitals   Temperature Pulse Respirations Blood Pressure SpO2   02/10/23 2357 02/10/23 2359 02/10/23 2359 02/10/23 2359 02/10/23 2359   98 7 °F (37 1 °C) (!) 118 18 (!) 195/108 99 %      Temp Source Heart Rate Source Patient Position - Orthostatic VS BP Location FiO2 (%)   02/10/23 2357 02/10/23 2359 02/10/23 2359 02/10/23 2359 --   Oral Monitor Sitting Right arm       Pain Score       --                    Orthostatic Vital Signs  Vitals:    02/11/23 0330 02/11/23 0345 02/11/23 0400 02/11/23 0430   BP: (!) 179/79 162/76 147/70 169/73   Pulse: (!) 110 105 104 (!) 111   Patient Position - Orthostatic VS: Lying Lying Lying Lying       Physical Exam  Vitals and nursing note reviewed  Constitutional:       General: She is not in acute distress  Appearance: Normal appearance  She is obese  She is ill-appearing and diaphoretic  She is not toxic-appearing  HENT:      Head: Normocephalic and atraumatic  Right Ear: External ear normal       Left Ear: External ear normal       Nose: Nose normal    Eyes:      General: No scleral icterus  Right eye: No discharge  Left eye: No discharge  Extraocular Movements: Extraocular movements intact  Conjunctiva/sclera: Conjunctivae normal    Cardiovascular:      Rate and Rhythm: Regular rhythm  Tachycardia present  Heart sounds: Normal heart sounds  No murmur heard  No friction rub   No gallop  Pulmonary:      Effort: Pulmonary effort is normal  No respiratory distress  Breath sounds: Normal breath sounds  Abdominal:      General: Abdomen is flat  There is no distension  Palpations: Abdomen is soft  There is no mass  Tenderness: There is no abdominal tenderness  Genitourinary:     Comments: Deferred  Musculoskeletal:         General: Normal range of motion  Cervical back: Normal range of motion  Right lower leg: Edema present  Left lower leg: Edema present  Comments: Minimal tenderness of the paralumbar muscles, no bony vertebral tenderness  No stepoffs, deformities or skin changes  Full ROM  Strength of bilateral lower extremities 5/5 in ankle flexion and extension  EHL intact  Sensation intact including S1 distribution  DP/PT pulses 2+/4  Skin:     General: Skin is warm  Neurological:      General: No focal deficit present  Mental Status: She is alert  Psychiatric:         Mood and Affect: Mood normal          ED Medications  Medications   sodium chloride 0 9 % bolus 1,000 mL (1,000 mL Intravenous New Bag 2/11/23 0127)   cefTRIAXone (ROCEPHIN) IVPB (premix in dextrose) 2,000 mg 50 mL (0 mg Intravenous Stopped 2/11/23 0314)   azithromycin (ZITHROMAX) 500 mg in sodium chloride 0 9% 250mL IVPB 500 mg (0 mg Intravenous Stopped 2/11/23 0443)       Diagnostic Studies  Results Reviewed     Procedure Component Value Units Date/Time    Urine culture [[id number]] Collected: 02/11/23 0246    Lab Status:  In process Specimen: Urine, Straight Cath Updated: 02/11/23 0434    Procalcitonin [[id number]]  (Normal) Collected: 02/11/23 0106    Lab Status: Final result Specimen: Blood from Arm, Left Updated: 02/11/23 0430     Procalcitonin 0 08 ng/ml     Lactic acid 2 Hours [[id number]]  (Abnormal) Collected: 02/11/23 0310    Lab Status: Final result Specimen: Blood from Arm, Left Updated: 02/11/23 0411     LACTIC ACID 2 1 mmol/L     Narrative:      Result may be elevated if tourniquet was used during collection  HS Troponin I 2hr [[id number]]  (Normal) Collected: 02/11/23 0310    Lab Status: Final result Specimen: Blood from Arm, Left Updated: 02/11/23 0404     hs TnI 2hr 7 ng/L      Delta 2hr hsTnI 0 ng/L     Urine Microscopic [[id number]]  (Abnormal) Collected: 02/11/23 0246    Lab Status: Final result Specimen: Urine, Straight Cath Updated: 02/11/23 0323     RBC, UA 0-1 /hpf      WBC, UA 2-4 /hpf      Epithelial Cells Occasional /hpf      Bacteria, UA Innumerable /hpf      Hyaline Casts, UA 1-2 /lpf      MUCUS THREADS Occasional    UA w Reflex to Microscopic w Reflex to Culture [[id number]]  (Abnormal) Collected: 02/11/23 0246    Lab Status: Final result Specimen: Urine, Straight Cath Updated: 02/11/23 0312     Color, UA Yellow     Clarity, UA Clear     Specific Gravity, UA >=1 030     pH, UA 6 0     Leukocytes, UA Negative     Nitrite, UA Negative     Protein, UA >=300 mg/dl      Glucose,  (1/2%) mg/dl      Ketones, UA Negative mg/dl      Urobilinogen, UA 0 2 E U /dl      Bilirubin, UA Negative     Occult Blood, UA Small    HS Troponin I 4hr [[id number]]     Lab Status: No result Specimen: Blood     Blood culture #1 [[id number]] Collected: 02/11/23 0106    Lab Status: In process Specimen: Blood from Arm, Left Updated: 02/11/23 0218    Blood culture #2 [[id number]] Collected: 02/11/23 0208    Lab Status: In process Specimen: Blood from Arm, Right Updated: 02/11/23 0217    FLU/RSV/COVID - if FLU/RSV clinically relevant [[id number]]  (Normal) Collected: 02/11/23 0118    Lab Status: Final result Specimen: Nares from Nose Updated: 02/11/23 0215     SARS-CoV-2 Negative     INFLUENZA A PCR Negative     INFLUENZA B PCR Negative     RSV PCR Negative    Narrative:      FOR PEDIATRIC PATIENTS - copy/paste COVID Guidelines URL to browser: [email or web address]  [email or web address]    SARS-CoV-2 assay is a Nucleic Acid Amplification assay intended for the  qualitative detection of nucleic acid from SARS-CoV-2 in nasopharyngeal  swabs  Results are for the presumptive identification of SARS-CoV-2 RNA  Positive results are indicative of infection with SARS-CoV-2, the virus  causing COVID-19, but do not rule out bacterial infection or co-infection  with other viruses  Laboratories within the [place] and its  territories are required to report all positive results to the appropriate  public health authorities  Negative results do not preclude SARS-CoV-2  infection and should not be used as the sole basis for treatment or other  patient management decisions  Negative results must be combined with  clinical observations, patient history, and epidemiological information  This test has not been FDA cleared or approved  This test has been authorized by FDA under an Emergency Use Authorization  (EUA)  This test is only authorized for the duration of time the  declaration that circumstances exist justifying the authorization of the  emergency use of an in vitro diagnostic tests for detection of SARS-CoV-2  virus and/or diagnosis of COVID-19 infection under section 564(b)(1) of  the Act, 21 U  S C  277XEA-8(U)(9), unless the authorization is terminated  or revoked sooner  The test has been validated but independent review by FDA  and CLIA is pending  Test performed using [employer] GeneXpert: This RT-PCR assay targets N2,  a region unique to SARS-CoV-2  A conserved region in the E-gene was chosen  for pan-Sarbecovirus detection which includes SARS-CoV-2  According to CMS-2020-01-R, this platform meets the definition of high-throughput technology  Lactic acid [[id number]]  (Abnormal) Collected: 02/11/23 0106    Lab Status: Final result Specimen: Blood from Arm, Left Updated: 02/11/23 0200     LACTIC ACID 2 4 mmol/L     Narrative:      Result may be elevated if tourniquet was used during collection      HS Troponin 0hr (reflex protocol) [[id number]]  (Normal) Collected: 02/11/23 0106    Lab Status: Final result Specimen: Blood from Arm, Left Updated: 02/11/23 0154     hs TnI 0hr 7 ng/L     Comprehensive metabolic panel [[id number]]  (Abnormal) Collected: 02/11/23 0106    Lab Status: Final result Specimen: Blood from Arm, Left Updated: 02/11/23 0151     Sodium 142 mmol/L      Potassium 3 7 mmol/L      Chloride 108 mmol/L      CO2 26 mmol/L      ANION GAP 8 mmol/L      BUN 17 mg/dL      Creatinine 1 30 mg/dL      Glucose 142 mg/dL      Calcium 9 1 mg/dL      Corrected Calcium 9 6 mg/dL      AST 13 U/L      ALT 14 U/L      Alkaline Phosphatase 114 U/L      Total Protein 6 9 g/dL      Albumin 3 4 g/dL      Total Bilirubin 0 42 mg/dL      eGFR 43 ml/min/1 73sq m     Narrative:      [place] guidelines for Chronic Kidney Disease (CKD):   •  Stage 1 with normal or high GFR (GFR > 90 mL/min/1 73 square meters)  •  Stage 2 Mild CKD (GFR = 60-89 mL/min/1 73 square meters)  •  Stage 3A Moderate CKD (GFR = 45-59 mL/min/1 73 square meters)  •  Stage 3B Moderate CKD (GFR = 30-44 mL/min/1 73 square meters)  •  Stage 4 Severe CKD (GFR = 15-29 mL/min/1 73 square meters)  •  Stage 5 End Stage CKD (GFR <15 mL/min/1 73 square meters)  Note: GFR calculation is accurate only with a steady state creatinine    Protime-INR [[id number]]  (Normal) Collected: 02/11/23 0106    Lab Status: Final result Specimen: Blood from Arm, Left Updated: 02/11/23 0143     Protime 13 7 seconds      INR 1 05    APTT [[id number]]  (Normal) Collected: 02/11/23 0106    Lab Status: Final result Specimen: Blood from Arm, Left Updated: 02/11/23 0143     PTT 30 seconds     CBC and differential [[id number]]  (Abnormal) Collected: 02/11/23 0106    Lab Status: Final result Specimen: Blood from Arm, Left Updated: 02/11/23 0128     WBC 13 97 Thousand/uL      RBC 4 35 Million/uL      Hemoglobin 10 2 g/dL      Hematocrit 34 8 %      MCV 80 fL      MCH 23 4 pg      MCHC 29 3 g/dL      RDW 17 3 %      MPV 9 8 fL      Platelets 740 Thousands/uL      nRBC 0 /100 WBCs      Neutrophils Relative 77 %      Immat GRANS % 1 %      Lymphocytes Relative 13 %      Monocytes Relative 7 %      Eosinophils Relative 1 %      Basophils Relative 1 %      Neutrophils Absolute 10 82 Thousands/µL      Immature Grans Absolute 0 11 Thousand/uL      Lymphocytes Absolute 1 86 Thousands/µL      Monocytes Absolute 1 01 Thousand/µL      Eosinophils Absolute 0 10 Thousand/µL      Basophils Absolute 0 07 Thousands/µL                  XR chest portable   ED Interpretation by Greogry Mendoza DO (02/11 0153)   Questionable right lower lung field consolidation versus atelectasis      CT head wo contrast    (Results Pending)   CT spine lumbar wo contrast    (Results Pending)   CT spine cervical wo contrast    (Results Pending)         Procedures  Procedures      ED Course  ED Course as of 02/11/23 0444   Sat Feb 11, 2023   0129 WBC(!): 13 97  Elevated  At this point patient meeting SIRS and I believe this may be due to a new infection  Will order remaining sepsis labs and ceftriaxone for empiric coverage  9131 Procedure Note: EKG  Date/Time: 02/11/23 1:32 AM   Interpreted by: Savanna Boggs   Indications / Diagnosis: shortness of breath  ECG reviewed by me, the ED Provider: yes   The EKG demonstrates:  Rhythm: sinus tachycardia 107  Intervals: normal intervals  Axis: normal axis  QRS/Blocks: normal QRS  ST Changes: No acute ST Changes, no STD/JESSICA    0201 LACTIC ACID(!!): 2 4  Sepsis alert called  Patient already with ceftriaxone ordered                             Initial Sepsis Screening     Row Name 02/11/23 0200 02/11/23 5402             Is the patient's history suggestive of a new or worsening infection? -- --       Suspected source of infection -- suspect infection, source unknown  -GJ       Are two or more of the following signs & symptoms of infection both present and new to the patient? -- Yes (Proceed)  -GJ       Indicate SIRS criteria -- Tachycardia > 90 bpm;Leukocytosis (WBC > 12648 IJL)  -GJ       If the answer is yes to both questions, suspicion of sepsis is present -- --       If severe sepsis is present AND tissue hypoperfusion perists in the hour after fluid resuscitation or lactate > 4, the patient meets criteria for SEPTIC SHOCK -- --       Are any of the following organ dysfunction criteria present within 6 hours of suspected infection and SIRS criteria that are NOT considered to be chronic conditions? Yes  -GJ --       Organ dysfunction Lactate > 2 0 mmol/L  -GJ --       Date of presentation of severe sepsis 02/11/23  -GJ --       Time of presentation of severe sepsis 0200  -GJ --       Tissue hypoperfusion persists in the hour after crystalloid fluid administration, evidenced, by either: -- --       Was hypotension present within one hour of the conclusion of crystalloid fluid administration? -- --       Date of presentation of septic shock -- --       Time of presentation of septic shock -- --             User Key  (r) = Recorded By, (t) = Taken By, (c) = Cosigned By    [facility] [address] [place] [place] [address] Name Provider Type    [address] [facility] Resident              Default Flowsheet Data (last 720 hours)     Sepsis Reassess     Row Name 02/11/23 0303                   Repeat Volume Status and Tissue Perfusion Assessment Performed    Repeat Volume Status and Tissue Perfusion Assessment Performed Yes  -GJ           Volume Status and Tissue Perfusion Post Fluid Resuscitation * Must Document All *    Vital Signs Reviewed (HR, RR, BP, T) Yes  -GJ        Shock Index Reviewed --        Arterial Oxygen Saturation Reviewed (POx, SaO2 or SpO2) --        Cardio Normal S1/S2; Tachycardia  -GJ        Pulmonary Normal effort;Clear to auscultation  -GJ        Capillary Refill Brisk  -GJ        Peripheral Pulses Radial  -GJ        Peripheral Pulse +2  -GJ        Skin Warm;Dry  -GJ        Urine output assessed Adequate  -GJ           *OR*   Intensive Monitoring- Must Document One of the Following Four *:    Vital Signs Reviewed --        * Central Venous Pressure (CVP or RAP) --        * Central Venous Oxygen (SVO2, ScvO2 or Oxygen saturation via central catheter) --        * Bedside Cardiovascular US in IVC diameter and % collapse --        * Passive Leg Raise OR Crystalloid Challenge --              User Key  (r) = Recorded By, (t) = Taken By, (c) = Cosigned By    Initials Name Provider Type    [address] [place] [address] Resident              SBIRT 20yo+    Flowsheet Row Most Recent Value   SBIRT (25 yo +)    In order to provide better care to our patients, we are screening all of our patients for alcohol and drug use  Would it be okay to ask you these screening questions? No Filed at: 02/11/2023 0301                Medical Decision Making  Patient is a 57year old female presenting for evaluation of urinary incontinence, chills, and cough  Based on this patient's presentation I have high clinical suspicion for an acute infection and am concerned for sepsis  Patient meeting SIRS criteria based on tachycardia and leukocytosis  Unclear source although differential includes urinary versus pulmonary  At this time, the patient is satting well on RA, normotensive, and appears hemodynamically stable  Will start empiric antibiotics to include ceftriaxone  Sepsis was recognized as outlined in ED course, and sepsis alert was called as outlined in ED course  Labs remarkable for lactate of 2 4, otherwise largely unremarkable  Urine negative for infection  Chest x-ray appears to show a consolidation versus atelectasis in the right lower lung field on my independent interpretation which given her clinical presentation presumed to be a pneumonia  As the patient does have a suspected infection and she is meeting sepsis criteria, will plan to admit for further management  Patient seems to understand this plan and is agreeable  All questions answered   Patient admitted  Pneumonia: acute illness or injury  Sepsis [facility]): acute illness or injury  Amount and/or Complexity of Data Reviewed  Labs: ordered  Decision-making details documented in ED Course  Radiology: ordered and independent interpretation performed  Risk  Prescription drug management  Decision regarding hospitalization  Disposition  Final diagnoses:   Pneumonia   Sepsis ([facility] [address] )     Time reflects when diagnosis was documented in both MDM as applicable and the Disposition within this note     Time User Action Codes Description Comment    2/11/2023  3:23 AM Rudene Roles Add [J18 9] Pneumonia     2/11/2023  3:23 AM Rudene Roles Add [A41 9] Sepsis [facility])       ED Disposition     ED Disposition   Admit    Condition   Stable    Date/Time   Sat Feb 11, 2023  3:37 AM    Comment   Case was discussed with ROBERTO and the patient's admission status was agreed to be Admission Status: inpatient status to the service of Dr oJseph Live            Follow-up Information    None         Patient's Medications   Discharge Prescriptions    No medications on file     No discharge procedures on file  PDMP Review     None           ED Provider  Attending physically available and evaluated Brayan Ayala I managed the patient along with the ED Attending      Electronically Signed by         Tremayne Moses DO  02/11/23 2324

## 2023-02-11 NOTE — SEPSIS NOTE
Sepsis Note   Rosie Ram 57 y o  female MRN: [id number]  Unit/Bed#: ED-02 Encounter: [id number]       qSOFA     Row Name 02/11/23 0248 02/10/23 2359             Altered mental status GCS < 15 -- --       Respiratory Rate > / =36 0 0       Systolic BP < / =571 0 0       Q Sofa Score 0 0                  Initial Sepsis Screening     Row Name 02/11/23 0200 02/11/23 1610             Is the patient's history suggestive of a new or worsening infection? -- --       Suspected source of infection -- suspect infection, source unknown  -GJ       Are two or more of the following signs & symptoms of infection both present and new to the patient? -- Yes (Proceed)  -GJ       Indicate SIRS criteria -- Tachycardia > 90 bpm;Leukocytosis (WBC > 79177 IJL)  -GJ       If the answer is yes to both questions, suspicion of sepsis is present -- --       If severe sepsis is present AND tissue hypoperfusion perists in the hour after fluid resuscitation or lactate > 4, the patient meets criteria for SEPTIC SHOCK -- --       Are any of the following organ dysfunction criteria present within 6 hours of suspected infection and SIRS criteria that are NOT considered to be chronic conditions?  Yes  -GJ --       Organ dysfunction Lactate > 2 0 mmol/L  -GJ --       Date of presentation of severe sepsis 02/11/23  -GJ --       Time of presentation of severe sepsis 0200  -GJ --       Tissue hypoperfusion persists in the hour after crystalloid fluid administration, evidenced, by either: -- --       Was hypotension present within one hour of the conclusion of crystalloid fluid administration? -- --       Date of presentation of septic shock -- --       Time of presentation of septic shock -- --             User Key  (r) = Recorded By, (t) = Taken By, (c) = Cosigned By    [address] Name Provider Type    [address] [facility],  Resident                  1215 Elsa Bee (last 720 hours)     Sepsis Reassess     Row Name 02/11/23 8650 Repeat Volume Status and Tissue Perfusion Assessment Performed    Repeat Volume Status and Tissue Perfusion Assessment Performed Yes  -GJ           Volume Status and Tissue Perfusion Post Fluid Resuscitation * Must Document All *    Vital Signs Reviewed (HR, RR, BP, T) Yes  -GJ        Shock Index Reviewed --        Arterial Oxygen Saturation Reviewed (POx, SaO2 or SpO2) --        Cardio Normal S1/S2; Tachycardia  -GJ        Pulmonary Normal effort;Clear to auscultation  -GJ        Capillary Refill Brisk  -GJ        Peripheral Pulses Radial  -GJ        Peripheral Pulse +2  -GJ        Skin Warm;Dry  -GJ        Urine output assessed Adequate  -GJ           *OR*   Intensive Monitoring- Must Document One of the Following Four *:    Vital Signs Reviewed --        * Central Venous Pressure (CVP or RAP) --        * Central Venous Oxygen (SVO2, ScvO2 or Oxygen saturation via central catheter) --        * Bedside Cardiovascular US in IVC diameter and % collapse --        * Passive Leg Raise OR Crystalloid Challenge --              User Key  (r) = Recorded By, (t) = Taken By, (c) = Cosigned By    Initials Name Provider Type    [address] [place] [address] Resident

## 2023-02-11 NOTE — PLAN OF CARE
Problem: Potential for Falls  Goal: Patient will remain free of falls  Description: INTERVENTIONS:  - Educate patient/family on patient safety including physical limitations  - Instruct patient to call for assistance with activity   - Consult OT/PT to assist with strengthening/mobility   - Keep Call bell within reach  - Keep bed low and locked with side rails adjusted as appropriate  - Keep care items and personal belongings within reach  - Initiate and maintain comfort rounds  - Make Fall Risk Sign visible to staff  - Offer Toileting every 2 Hours, in advance of need  - Initiate/Maintain bed alarm  - Obtain necessary fall risk management equipment: bedside commode  - Apply yellow socks and bracelet for high fall risk patients  - Consider moving patient to room near nurses station  2/11/2023 0727 by Heron Shelton RN  Outcome: Progressing  2/11/2023 0726 by Heron Shelton RN  Outcome: Progressing     Problem: Nutrition/Hydration-ADULT  Goal: Nutrient/Hydration intake appropriate for improving, restoring or maintaining nutritional needs  Description: Monitor and assess patient's nutrition/hydration status for malnutrition  Collaborate with interdisciplinary team and initiate plan and interventions as ordered  Monitor patient's weight and dietary intake as ordered or per policy  Utilize nutrition screening tool and intervene as necessary  Determine patient's food preferences and provide high-protein, high-caloric foods as appropriate       INTERVENTIONS:  - Monitor oral intake, urinary output, labs, and treatment plans  - Assess nutrition and hydration status and recommend course of action  - Evaluate amount of meals eaten  - Assist patient with eating if necessary   - Allow adequate time for meals  - Recommend/ encourage appropriate diets, oral nutritional supplements, and vitamin/mineral supplements  - Order, calculate, and assess calorie counts as needed  - Recommend, monitor, and adjust tube feedings and TPN/PPN based on assessed needs  - Assess need for intravenous fluids  - Provide specific nutrition/hydration education as appropriate  - Include patient/family/caregiver in decisions related to nutrition  2/11/2023 0727 by Kendell Downs RN  Outcome: Progressing  2/11/2023 0726 by Kendell Downs RN  Outcome: Progressing     Problem: MOBILITY - ADULT  Goal: Maintain or return to baseline ADL function  Description: INTERVENTIONS:  -  Assess patient's ability to carry out ADLs; assess patient's baseline for ADL function and identify physical deficits which impact ability to perform ADLs (bathing, care of mouth/teeth, toileting, grooming, dressing, etc )  - Assess/evaluate cause of self-care deficits   - Assess range of motion  - Assess patient's mobility; develop plan if impaired  - Assess patient's need for assistive devices and provide as appropriate  - Encourage maximum independence but intervene and supervise when necessary  - Involve family in performance of ADLs  - Assess for home care needs following discharge   - Consider OT consult to assist with ADL evaluation and planning for discharge  - Provide patient education as appropriate  2/11/2023 0727 by Kendell Downs RN  Outcome: Progressing  2/11/2023 0726 by Kendell Downs RN  Outcome: Progressing  Goal: Maintains/Returns to pre admission functional level  Description: INTERVENTIONS:  - Perform BMAT or MOVE assessment daily    - Set and communicate daily mobility goal to care team and patient/family/caregiver     - Collaborate with rehabilitation services on mobility goals if consulted  - Out of bed for toileting  - Record patient progress and toleration of activity level   2/11/2023 0727 by Kendell Downs RN  Outcome: Progressing  2/11/2023 0726 by Kendell Downs RN  Outcome: Progressing     Problem: PAIN - ADULT  Goal: Verbalizes/displays adequate comfort level or baseline comfort level  Description: Interventions:  - Encourage patient to monitor pain and request assistance  - Assess pain using appropriate pain scale  - Administer analgesics based on type and severity of pain and evaluate response  - Implement non-pharmacological measures as appropriate and evaluate response  - Consider cultural and social influences on pain and pain management  - Notify physician/advanced practitioner if interventions unsuccessful or patient reports new pain  2/11/2023 0727 by Juanita Mason RN  Outcome: Progressing  2/11/2023 0726 by Juanita Mason RN  Outcome: Progressing     Problem: INFECTION - ADULT  Goal: Absence or prevention of progression during hospitalization  Description: INTERVENTIONS:  - Assess and monitor for signs and symptoms of infection  - Monitor lab/diagnostic results  - Monitor all insertion sites, i e  indwelling lines, tubes, and drains  - Monitor endotracheal if appropriate and nasal secretions for changes in amount and color  - [place] appropriate cooling/warming therapies per order  - Administer medications as ordered  - Instruct and encourage patient and family to use good hand hygiene technique  - Identify and instruct in appropriate isolation precautions for identified infection/condition  2/11/2023 0727 by Juanita Mason RN  Outcome: Progressing  2/11/2023 0726 by Juanita Mason RN  Outcome: Progressing  Goal: Absence of fever/infection during neutropenic period  Description: INTERVENTIONS:  - Monitor WBC    2/11/2023 0727 by Juanita Mason RN  Outcome: Progressing  2/11/2023 0726 by Juanita Mason RN  Outcome: Progressing     Problem: SAFETY ADULT  Goal: Patient will remain free of falls  Description: INTERVENTIONS:  - Educate patient/family on patient safety including physical limitations  - Instruct patient to call for assistance with activity   - Consult OT/PT to assist with strengthening/mobility   - Keep Call bell within reach  - Keep bed low and locked with side rails adjusted as appropriate  - Keep care items and personal belongings within reach  - Initiate and maintain comfort rounds  - Make Fall Risk Sign visible to staff  - Apply yellow socks and bracelet for high fall risk patients  - Consider moving patient to room near nurses station  2/11/2023 0727 by Claudia Weller RN  Outcome: Progressing  2/11/2023 0726 by Claudia Weller RN  Outcome: Progressing  Goal: Maintain or return to baseline ADL function  Description: INTERVENTIONS:  -  Assess patient's ability to carry out ADLs; assess patient's baseline for ADL function and identify physical deficits which impact ability to perform ADLs (bathing, care of mouth/teeth, toileting, grooming, dressing, etc )  - Assess/evaluate cause of self-care deficits   - Assess range of motion  - Assess patient's mobility; develop plan if impaired  - Assess patient's need for assistive devices and provide as appropriate  - Encourage maximum independence but intervene and supervise when necessary  - Involve family in performance of ADLs  - Assess for home care needs following discharge   - Consider OT consult to assist with ADL evaluation and planning for discharge  - Provide patient education as appropriate  2/11/2023 0727 by Claudia Weller RN  Outcome: Progressing  2/11/2023 0726 by Claudia Weller RN  Outcome: Progressing  Goal: Maintains/Returns to pre admission functional level  Description: INTERVENTIONS:  - Perform BMAT or MOVE assessment daily    - Set and communicate daily mobility goal to care team and patient/family/caregiver     - Collaborate with rehabilitation services on mobility goals if consulted  - Out of bed for toileting  - Record patient progress and toleration of activity level   2/11/2023 0727 by Claudia Weller RN  Outcome: Progressing  2/11/2023 0726 by Claudia Weller RN  Outcome: Progressing     Problem: DISCHARGE PLANNING  Goal: Discharge to home or other facility with appropriate resources  Description: INTERVENTIONS:  - Identify barriers to discharge w/patient and caregiver  - Arrange for needed discharge resources and transportation as appropriate  - Identify discharge learning needs (meds, wound care, etc )  - Arrange for interpretive services to assist at discharge as needed  - Refer to Case Management Department for coordinating discharge planning if the patient needs post-hospital services based on physician/advanced practitioner order or complex needs related to functional status, cognitive ability, or social support system  2/11/2023 0727 by Aaron Olmstead RN  Outcome: Progressing  2/11/2023 0726 by Aaron Olmstead RN  Outcome: Progressing     Problem: Knowledge Deficit  Goal: Patient/family/caregiver demonstrates understanding of disease process, treatment plan, medications, and discharge instructions  Description: Complete learning assessment and assess knowledge base    Interventions:  - Provide teaching at level of understanding  - Provide teaching via preferred learning methods  2/11/2023 0727 by Aaron Olmstead RN  Outcome: Progressing  2/11/2023 0726 by Aaron Olmstead RN  Outcome: Progressing     Problem: GENITOURINARY - ADULT  Goal: Maintains or returns to baseline urinary function  Description: INTERVENTIONS:  - Assess urinary function  - Encourage oral fluids to ensure adequate hydration if ordered  - Administer IV fluids as ordered to ensure adequate hydration  - Administer ordered medications as needed  - Offer frequent toileting  - Follow urinary retention protocol if ordered  2/11/2023 0727 by Aaron Olmstead RN  Outcome: Progressing  2/11/2023 0726 by Aaron Olmstead RN  Outcome: Progressing  Goal: Absence of urinary retention  Description: INTERVENTIONS:  - Assess patient’s ability to void and empty bladder  - Monitor I/O  - Bladder scan as needed  - Discuss with physician/AP medications to alleviate retention as needed  - Discuss catheterization for long term situations as appropriate  2/11/2023 0727 by Aaron Olmstead RN  Outcome: Progressing  2/11/2023 0726 by Aaron Olmstead RN  Outcome: Progressing  Goal: Urinary catheter remains patent  Description: INTERVENTIONS:  - Assess patency of urinary catheter  - If patient has a chronic patel, consider changing catheter if non-functioning  - Follow guidelines for intermittent irrigation of non-functioning urinary catheter  2/11/2023 0727 by Osorio Mccoy RN  Outcome: Progressing  2/11/2023 0726 by Osorio Mccoy RN  Outcome: Progressing

## 2023-02-12 LAB
ANION GAP SERPL CALCULATED.3IONS-SCNC: 6 MMOL/L (ref 4–13)
BACTERIA UR CULT: NORMAL
BASOPHILS # BLD AUTO: 0.07 THOUSANDS/ÂΜL (ref 0–0.1)
BASOPHILS NFR BLD AUTO: 1 % (ref 0–1)
BUN SERPL-MCNC: 12 MG/DL (ref 5–25)
CALCIUM SERPL-MCNC: 8.2 MG/DL (ref 8.4–10.2)
CHLORIDE SERPL-SCNC: 110 MMOL/L (ref 96–108)
CO2 SERPL-SCNC: 27 MMOL/L (ref 21–32)
CREAT SERPL-MCNC: 0.98 MG/DL (ref 0.6–1.3)
EOSINOPHIL # BLD AUTO: 0.4 THOUSAND/ÂΜL (ref 0–0.61)
EOSINOPHIL NFR BLD AUTO: 4 % (ref 0–6)
ERYTHROCYTE [DISTWIDTH] IN BLOOD BY AUTOMATED COUNT: 17.6 % (ref 11.6–15.1)
GFR SERPL CREATININE-BSD FRML MDRD: 61 ML/MIN/1.73SQ M
GLUCOSE SERPL-MCNC: 146 MG/DL (ref 65–140)
GLUCOSE SERPL-MCNC: 176 MG/DL (ref 65–140)
GLUCOSE SERPL-MCNC: 192 MG/DL (ref 65–140)
GLUCOSE SERPL-MCNC: 198 MG/DL (ref 65–140)
GLUCOSE SERPL-MCNC: 206 MG/DL (ref 65–140)
HCT VFR BLD AUTO: 30.4 % (ref 34.8–46.1)
HGB BLD-MCNC: 8.9 G/DL (ref 11.5–15.4)
IMM GRANULOCYTES # BLD AUTO: 0.1 THOUSAND/UL (ref 0–0.2)
IMM GRANULOCYTES NFR BLD AUTO: 1 % (ref 0–2)
LYMPHOCYTES # BLD AUTO: 2.17 THOUSANDS/ÂΜL (ref 0.6–4.47)
LYMPHOCYTES NFR BLD AUTO: 22 % (ref 14–44)
MCH RBC QN AUTO: 23.4 PG (ref 26.8–34.3)
MCHC RBC AUTO-ENTMCNC: 29.3 G/DL (ref 31.4–37.4)
MCV RBC AUTO: 80 FL (ref 82–98)
MONOCYTES # BLD AUTO: 0.79 THOUSAND/ÂΜL (ref 0.17–1.22)
MONOCYTES NFR BLD AUTO: 8 % (ref 4–12)
NEUTROPHILS # BLD AUTO: 6.22 THOUSANDS/ÂΜL (ref 1.85–7.62)
NEUTS SEG NFR BLD AUTO: 64 % (ref 43–75)
NRBC BLD AUTO-RTO: 0 /100 WBCS
PLATELET # BLD AUTO: 298 THOUSANDS/UL (ref 149–390)
PMV BLD AUTO: 10.3 FL (ref 8.9–12.7)
POTASSIUM SERPL-SCNC: 3.5 MMOL/L (ref 3.5–5.3)
PROCALCITONIN SERPL-MCNC: 0.06 NG/ML
RBC # BLD AUTO: 3.8 MILLION/UL (ref 3.81–5.12)
SODIUM SERPL-SCNC: 143 MMOL/L (ref 135–147)
WBC # BLD AUTO: 9.75 THOUSAND/UL (ref 4.31–10.16)

## 2023-02-12 PROCEDURE — 82948 REAGENT STRIP/BLOOD GLUCOSE: CPT

## 2023-02-12 PROCEDURE — 84145 PROCALCITONIN (PCT): CPT | Performed by: PHYSICIAN ASSISTANT

## 2023-02-12 PROCEDURE — 80048 BASIC METABOLIC PNL TOTAL CA: CPT | Performed by: PHYSICIAN ASSISTANT

## 2023-02-12 PROCEDURE — 99232 SBSQ HOSP IP/OBS MODERATE 35: CPT | Performed by: INTERNAL MEDICINE

## 2023-02-12 PROCEDURE — 85025 COMPLETE CBC W/AUTO DIFF WBC: CPT | Performed by: PHYSICIAN ASSISTANT

## 2023-02-12 RX ORDER — AMMONIUM LACTATE 12 G/100G
LOTION TOPICAL 2 TIMES DAILY PRN
Status: DISCONTINUED | OUTPATIENT
Start: 2023-02-12 | End: 2023-02-14 | Stop reason: HOSPADM

## 2023-02-12 RX ADMIN — HEPARIN SODIUM 5000 UNITS: 5000 INJECTION INTRAVENOUS; SUBCUTANEOUS at 12:30

## 2023-02-12 RX ADMIN — Medication: at 17:11

## 2023-02-12 RX ADMIN — ATORVASTATIN CALCIUM 40 MG: 40 TABLET, FILM COATED ORAL at 17:10

## 2023-02-12 RX ADMIN — METOPROLOL TARTRATE 50 MG: 50 TABLET, FILM COATED ORAL at 08:11

## 2023-02-12 RX ADMIN — PREDNISONE 4 MG: 1 TABLET ORAL at 08:11

## 2023-02-12 RX ADMIN — BUDESONIDE AND FORMOTEROL FUMARATE DIHYDRATE 1 PUFF: 160; 4.5 AEROSOL RESPIRATORY (INHALATION) at 08:12

## 2023-02-12 RX ADMIN — HEPARIN SODIUM 5000 UNITS: 5000 INJECTION INTRAVENOUS; SUBCUTANEOUS at 22:26

## 2023-02-12 RX ADMIN — HEPARIN SODIUM 5000 UNITS: 5000 INJECTION INTRAVENOUS; SUBCUTANEOUS at 05:09

## 2023-02-12 RX ADMIN — ASPIRIN 81 MG: 81 TABLET, COATED ORAL at 08:11

## 2023-02-12 RX ADMIN — METOPROLOL TARTRATE 50 MG: 50 TABLET, FILM COATED ORAL at 22:25

## 2023-02-12 RX ADMIN — BUDESONIDE AND FORMOTEROL FUMARATE DIHYDRATE 1 PUFF: 160; 4.5 AEROSOL RESPIRATORY (INHALATION) at 17:10

## 2023-02-12 RX ADMIN — LOSARTAN POTASSIUM 100 MG: 50 TABLET, FILM COATED ORAL at 08:11

## 2023-02-12 RX ADMIN — CEFTRIAXONE 1000 MG: 1 INJECTION, SOLUTION INTRAVENOUS at 01:43

## 2023-02-12 RX ADMIN — GABAPENTIN 100 MG: 100 CAPSULE ORAL at 17:10

## 2023-02-12 RX ADMIN — INSULIN LISPRO 2 UNITS: 100 INJECTION, SOLUTION INTRAVENOUS; SUBCUTANEOUS at 12:29

## 2023-02-12 RX ADMIN — DILTIAZEM HYDROCHLORIDE 480 MG: 240 CAPSULE, COATED, EXTENDED RELEASE ORAL at 08:11

## 2023-02-12 RX ADMIN — INSULIN LISPRO 2 UNITS: 100 INJECTION, SOLUTION INTRAVENOUS; SUBCUTANEOUS at 17:11

## 2023-02-12 RX ADMIN — INSULIN HUMAN 80 UNITS: 500 INJECTION, SOLUTION SUBCUTANEOUS at 08:11

## 2023-02-12 RX ADMIN — GABAPENTIN 100 MG: 100 CAPSULE ORAL at 08:11

## 2023-02-12 RX ADMIN — INSULIN HUMAN 20 UNITS: 500 INJECTION, SOLUTION SUBCUTANEOUS at 17:10

## 2023-02-12 RX ADMIN — FUROSEMIDE 20 MG: 20 TABLET ORAL at 08:11

## 2023-02-12 RX ADMIN — INSULIN LISPRO 2 UNITS: 100 INJECTION, SOLUTION INTRAVENOUS; SUBCUTANEOUS at 08:12

## 2023-02-12 NOTE — PROGRESS NOTES
[address] [place] [address]  Progress Note - Vasyl Tiwari 1960, 57 y o  female MRN: [id number]  Unit/Bed#: [facility] 68 2 -01 Encounter: [id number]  Primary Care Provider: Adelita Goldberg, MD   Date and time admitted to hospital: 2/11/2023 12:11 AM    Intertrigo  Assessment & Plan  W/skin breakdown Of right abd pannus Consult wcx nystatin powder    Weakness  Assessment & Plan  Reported by pt as generalized in setting of PMR, sepsis, however right  strength weaker than left although no overt pronator drift on exa, Also dorsi/plantar flexion in right leg worse than left although confounded given b/l leg weakness/numbness which may have component of lumbar spinal stenosis  Given occurrence of worsening generalized weakness x 2 weeks, unclear duration of s/sx onset  Appreciate neurology recommendations  Weakness likely multifactorial due to deconditioning, obesity, diabetic neuropathy, poor sleep, and lower ext edema  Recommended pt/ot eval   Check lower ext doppler due to lower ext edema       Abnormal urinalysis  Assessment & Plan  Suspect uti by innumerable bacteria however only 2-4 wbcs/hpf  Urine culture negative  Had been treated with ceftriaxone  Hold further antibiotics       Polymyalgia rheumatica (HCC)  Assessment & Plan  On prednisone 4mg po daily continue op regimen    Diabetes mellitus [facility])  Assessment & Plan  Lab Results   Component Value Date    HGBA1C 8 4 (A) 08/25/2022       Recent Labs     02/11/23  1122 02/11/23  1600 02/11/23  2124 02/12/23  0744   POCGLU 140 329* 257* 192*       Blood Sugar Average: Last 72 hrs:  (P) 175 [id number] metformin and human R U-500 80 units every morning and 20 to 40 units every afternoon before dinner  Continue 80 iu qam and 20 iu qpm w/ssi/poc bs    Morbid obesity ([address] )  Assessment & Plan  bmi 51 noted  Encourage lifestyle changes     Benign hypertension  Assessment & Plan  · Continue diltiazem losartan and will add metoprolol given tachycardia and elevated blood pressures    * Sepsis (HCC)  Assessment & Plan  Sepsis vs sirs poa by tachycardia/leucocytosis 2* uti vs pna   (did have some schneider w/climbing a hill/easy fatigability but also has new urinary frequency  cxr negative for pna  procal negative x2  Blood culture no growth for 24 hours  Urine culture negative  Will monitor off of antibiotics  Likely sirs       VTE Pharmacologic Prophylaxis: heparin     Mechanical VTE Prophylaxis in Place: Yes    Education and Discussions with Family / Malvin Parr and son in room     Current Length of Stay: 1 day(s)  Current Patient Status: Inpatient     Discharge Plan / Estimated Discharge Date: 24 to 48 hours but may be here longer due to social issues as she was evicted from housing and requires assistance with transfers  Code Status: Level 1 - Full Code      Subjective:   Pt seen and examined  She reports that her whole legs feel weak and painful when she tries to stand  Her right leg is worse then her left  Pain is sharp at times  No pain in her feet  No other problems were reported  Objective:     Vitals:   Temp (24hrs), Av 1 °F (36 7 °C), Min:97 9 °F (36 6 °C), Max:98 3 °F (36 8 °C)    Temp:  [97 9 °F (36 6 °C)-98 3 °F (36 8 °C)] 98 3 °F (36 8 °C)  HR:  [70-79] 70  Resp:  [17-19] 19  BP: (148-152)/(73-76) 152/75  SpO2:  [95 %-97 %] 95 %  Body mass index is 51 42 kg/m²  Input and Output Summary (last 24 hours): Intake/Output Summary (Last 24 hours) at 2023 1127  Last data filed at 2023 1122  Gross per 24 hour   Intake 1620 ml   Output 300 ml   Net 1320 ml       Physical Exam:   Physical Exam  Constitutional:       Appearance: Normal appearance  HENT:      Head: Normocephalic and atraumatic  Eyes:      Extraocular Movements: Extraocular movements intact  Pupils: Pupils are equal, round, and reactive to light  Cardiovascular:      Rate and Rhythm: Normal rate and regular rhythm        Heart sounds: No murmur heard     No friction rub  No gallop  Pulmonary:      Effort: Pulmonary effort is normal  No respiratory distress  Breath sounds: Normal breath sounds  No wheezing, rhonchi or rales  Abdominal:      General: Bowel sounds are normal  There is no distension  Palpations: Abdomen is soft  Tenderness: There is no abdominal tenderness  There is no guarding or rebound  Musculoskeletal:      Right lower leg: Edema present  Left lower leg: Edema present  Neurological:      Mental Status: She is alert and oriented to person, place, and time  Additional Data:     Labs:  Results from last 7 days   Lab Units 02/12/23  0513   WBC Thousand/uL 9 75   HEMOGLOBIN g/dL 8 9*   HEMATOCRIT % 30 4*   PLATELETS Thousands/uL 298   NEUTROS PCT % 64   LYMPHS PCT % 22   MONOS PCT % 8   EOS PCT % 4     Results from last 7 days   Lab Units 02/12/23  0513 02/11/23  0106   SODIUM mmol/L 143 142   POTASSIUM mmol/L 3 5 3 7   CHLORIDE mmol/L 110* 108   CO2 mmol/L 27 26   BUN mg/dL 12 17   CREATININE mg/dL 0 98 1 30   ANION GAP mmol/L 6 8   CALCIUM mg/dL 8 2* 9 1   ALBUMIN g/dL  --  3 4*   TOTAL BILIRUBIN mg/dL  --  0 42   ALK PHOS U/L  --  114*   ALT U/L  --  14   AST U/L  --  13   GLUCOSE RANDOM mg/dL 206* 142*     Results from last 7 days   Lab Units 02/11/23  0106   INR  1 05     Results from last 7 days   Lab Units 02/12/23  0744 02/11/23  2124 02/11/23  1600 02/11/23  1122 02/11/23  0843 02/11/23  0803 02/09/23  0647   POC GLUCOSE mg/dl 192* 257* 329* 140 85 48* 297*         Results from last 7 days   Lab Units 02/12/23  0513 02/11/23  0516 02/11/23  0310 02/11/23  0106   LACTIC ACID mmol/L  --  1 5 2 1* 2 4*   PROCALCITONIN ng/ml 0 06  --   --  0 08     Recent Cultures (last 7 days):     Results from last 7 days   Lab Units 02/11/23  0246 02/11/23  0208 02/11/23  0106   BLOOD CULTURE   --  No Growth at 24 hrs  No Growth at 24 hrs     URINE CULTURE  No Growth <1000 cfu/mL  --   -- Lines/Drains:  Invasive Devices     Peripheral Intravenous Line  Duration           Peripheral IV 02/11/23 Left Antecubital 1 day              Last 24 Hours Medication List:   Current Facility-Administered Medications   Medication Dose Route Frequency Provider Last Rate   • acetaminophen  650 mg Oral Q6H PRN Arlette Green PA-C     • ammonium lactate   Topical BID PRN Edy Crawford DO     • aspirin  81 mg Oral Daily Arlette Green PA-C     • atorvastatin  40 mg Oral Daily With Rufino MELVA Mcgrath     • budesonide-formoterol  1 puff Inhalation BID Arlette Green PA-C     • diltiazem  480 mg Oral Daily Arlette Green PA-C     • furosemide  20 mg Oral Daily Arlette Green PA-C     • gabapentin  100 mg Oral BID Arlette Green PA-C     • heparin (porcine)  5,000 Units Subcutaneous Q8H [address] Arlette Green PA-C     • insulin lispro  2-12 Units Subcutaneous 4x Daily (AC & HS) Braxton Vazquez DO     • insulin regular  20 Units Subcutaneous Before Dinner [employer], MELVA     • insulin regular  80 Units Subcutaneous Daily Before Breakfast Arlette Green PA-C     • labetalol  10 mg Intravenous Q6H PRN Clifton Hanna DO     • losartan  100 mg Oral Daily Arlette Green PA-C     • metoprolol tartrate  50 mg Oral Q12H [address] Braxton Vazquez DO     • ondansetron  4 mg Intravenous Q6H PRN Arlette Green PA-C     • predniSONE  4 mg Oral Daily Arlette Hernandes PA-C          Today, Patient Was Seen By: Edy Crawford DO

## 2023-02-12 NOTE — ASSESSMENT & PLAN NOTE
Reported by pt as generalized in setting of PMR, sepsis, however right  strength weaker than left although no overt pronator drift on exa, Also dorsi/plantar flexion in right leg worse than left although confounded given b/l leg weakness/numbness which may have component of lumbar spinal stenosis  Given occurrence of worsening generalized weakness x 2 weeks, unclear duration of s/sx onset  Appreciate neurology recommendations     Weakness likely multifactorial due to deconditioning, obesity, diabetic neuropathy, poor sleep, and lower ext edema  Recommended pt/ot eval   Check lower ext doppler due to lower ext edema

## 2023-02-12 NOTE — ASSESSMENT & PLAN NOTE
Suspect uti by innumerable bacteria however only 2-4 wbcs/hpf  Urine culture negative  Had been treated with ceftriaxone  Hold further antibiotics

## 2023-02-12 NOTE — ASSESSMENT & PLAN NOTE
Lab Results   Component Value Date    HGBA1C 8 4 (A) 08/25/2022       Recent Labs     02/11/23  1122 02/11/23  1600 02/11/23  2124 02/12/23  0744   POCGLU 140 329* 257* 192*       Blood Sugar Average: Last 72 hrs:  (P) 175 [id number] metformin and human R U-500 80 units every morning and 20 to 40 units every afternoon before dinner  Continue 80 iu qam and 20 iu qpm w/ssi/poc bs

## 2023-02-12 NOTE — PLAN OF CARE
Problem: Potential for Falls  Goal: Patient will remain free of falls  Description: INTERVENTIONS:  - Educate patient/family on patient safety including physical limitations  - Instruct patient to call for assistance with activity   - Consult OT/PT to assist with strengthening/mobility   - Keep Call bell within reach  - Keep bed low and locked with side rails adjusted as appropriate  - Keep care items and personal belongings within reach  - Initiate and maintain comfort rounds  - Make Fall Risk Sign visible to staff  - Offer Toileting every  Hours, in advance of need  - Initiate/Maintain alarm  - Obtain necessary fall risk management equipment:   - Apply yellow socks and bracelet for high fall risk patients  - Consider moving patient to room near nurses station  Outcome: Progressing     Problem: Nutrition/Hydration-ADULT  Goal: Nutrient/Hydration intake appropriate for improving, restoring or maintaining nutritional needs  Description: Monitor and assess patient's nutrition/hydration status for malnutrition  Collaborate with interdisciplinary team and initiate plan and interventions as ordered  Monitor patient's weight and dietary intake as ordered or per policy  Utilize nutrition screening tool and intervene as necessary  Determine patient's food preferences and provide high-protein, high-caloric foods as appropriate       INTERVENTIONS:  - Monitor oral intake, urinary output, labs, and treatment plans  - Assess nutrition and hydration status and recommend course of action  - Evaluate amount of meals eaten  - Assist patient with eating if necessary   - Allow adequate time for meals  - Recommend/ encourage appropriate diets, oral nutritional supplements, and vitamin/mineral supplements  - Order, calculate, and assess calorie counts as needed  - Recommend, monitor, and adjust tube feedings and TPN/PPN based on assessed needs  - Assess need for intravenous fluids  - Provide specific nutrition/hydration education as appropriate  - Include patient/family/caregiver in decisions related to nutrition  Outcome: Progressing     Problem: MOBILITY - ADULT  Goal: Maintain or return to baseline ADL function  Description: INTERVENTIONS:  -  Assess patient's ability to carry out ADLs; assess patient's baseline for ADL function and identify physical deficits which impact ability to perform ADLs (bathing, care of mouth/teeth, toileting, grooming, dressing, etc )  - Assess/evaluate cause of self-care deficits   - Assess range of motion  - Assess patient's mobility; develop plan if impaired  - Assess patient's need for assistive devices and provide as appropriate  - Encourage maximum independence but intervene and supervise when necessary  - Involve family in performance of ADLs  - Assess for home care needs following discharge   - Consider OT consult to assist with ADL evaluation and planning for discharge  - Provide patient education as appropriate  Outcome: Progressing  Goal: Maintains/Returns to pre admission functional level  Description: INTERVENTIONS:  - Perform BMAT or MOVE assessment daily    - Set and communicate daily mobility goal to care team and patient/family/caregiver  - Collaborate with rehabilitation services on mobility goals if consulted  - Perform Range of Motion  times a day  - Reposition patient every  hours    - Dangle patient  times a day  - Stand patient  times a day  - Ambulate patient  times a day  - Out of bed to chair  times a day   - Out of bed for meals  times a day  - Out of bed for toileting  - Record patient progress and toleration of activity level   Outcome: Progressing     Problem: PAIN - ADULT  Goal: Verbalizes/displays adequate comfort level or baseline comfort level  Description: Interventions:  - Encourage patient to monitor pain and request assistance  - Assess pain using appropriate pain scale  - Administer analgesics based on type and severity of pain and evaluate response  - Implement non-pharmacological measures as appropriate and evaluate response  - Consider cultural and social influences on pain and pain management  - Notify physician/advanced practitioner if interventions unsuccessful or patient reports new pain  Outcome: Progressing     Problem: INFECTION - ADULT  Goal: Absence or prevention of progression during hospitalization  Description: INTERVENTIONS:  - Assess and monitor for signs and symptoms of infection  - Monitor lab/diagnostic results  - Monitor all insertion sites, i e  indwelling lines, tubes, and drains  - Monitor endotracheal if appropriate and nasal secretions for changes in amount and color  - [place] appropriate cooling/warming therapies per order  - Administer medications as ordered  - Instruct and encourage patient and family to use good hand hygiene technique  - Identify and instruct in appropriate isolation precautions for identified infection/condition  Outcome: Progressing  Goal: Absence of fever/infection during neutropenic period  Description: INTERVENTIONS:  - Monitor WBC    Outcome: Progressing     Problem: SAFETY ADULT  Goal: Patient will remain free of falls  Description: INTERVENTIONS:  - Educate patient/family on patient safety including physical limitations  - Instruct patient to call for assistance with activity   - Consult OT/PT to assist with strengthening/mobility   - Keep Call bell within reach  - Keep bed low and locked with side rails adjusted as appropriate  - Keep care items and personal belongings within reach  - Initiate and maintain comfort rounds  - Make Fall Risk Sign visible to staff  - Offer Toileting every  Hours, in advance of need  - Initiate/Maintain alarm  - Obtain necessary fall risk management equipment:   - Apply yellow socks and bracelet for high fall risk patients  - Consider moving patient to room near nurses station  Outcome: Progressing  Goal: Maintain or return to baseline ADL function  Description: INTERVENTIONS:  -  Assess patient's ability to carry out ADLs; assess patient's baseline for ADL function and identify physical deficits which impact ability to perform ADLs (bathing, care of mouth/teeth, toileting, grooming, dressing, etc )  - Assess/evaluate cause of self-care deficits   - Assess range of motion  - Assess patient's mobility; develop plan if impaired  - Assess patient's need for assistive devices and provide as appropriate  - Encourage maximum independence but intervene and supervise when necessary  - Involve family in performance of ADLs  - Assess for home care needs following discharge   - Consider OT consult to assist with ADL evaluation and planning for discharge  - Provide patient education as appropriate  Outcome: Progressing  Goal: Maintains/Returns to pre admission functional level  Description: INTERVENTIONS:  - Perform BMAT or MOVE assessment daily    - Set and communicate daily mobility goal to care team and patient/family/caregiver  - Collaborate with rehabilitation services on mobility goals if consulted  - Perform Range of Motion  times a day  - Reposition patient every  hours    - Dangle patient  times a day  - Stand patient  times a day  - Ambulate patient  times a day  - Out of bed to chair  times a day   - Out of bed for meals times a day  - Out of bed for toileting  - Record patient progress and toleration of activity level   Outcome: Progressing     Problem: DISCHARGE PLANNING  Goal: Discharge to home or other facility with appropriate resources  Description: INTERVENTIONS:  - Identify barriers to discharge w/patient and caregiver  - Arrange for needed discharge resources and transportation as appropriate  - Identify discharge learning needs (meds, wound care, etc )  - Arrange for interpretive services to assist at discharge as needed  - Refer to Case Management Department for coordinating discharge planning if the patient needs post-hospital services based on physician/advanced practitioner order or complex needs related to functional status, cognitive ability, or social support system  Outcome: Progressing     Problem: Knowledge Deficit  Goal: Patient/family/caregiver demonstrates understanding of disease process, treatment plan, medications, and discharge instructions  Description: Complete learning assessment and assess knowledge base    Interventions:  - Provide teaching at level of understanding  - Provide teaching via preferred learning methods  Outcome: Progressing     Problem: GENITOURINARY - ADULT  Goal: Maintains or returns to baseline urinary function  Description: INTERVENTIONS:  - Assess urinary function  - Encourage oral fluids to ensure adequate hydration if ordered  - Administer IV fluids as ordered to ensure adequate hydration  - Administer ordered medications as needed  - Offer frequent toileting  - Follow urinary retention protocol if ordered  Outcome: Progressing  Goal: Absence of urinary retention  Description: INTERVENTIONS:  - Assess patient’s ability to void and empty bladder  - Monitor I/O  - Bladder scan as needed  - Discuss with physician/AP medications to alleviate retention as needed  - Discuss catheterization for long term situations as appropriate  Outcome: Progressing  Goal: Urinary catheter remains patent  Description: INTERVENTIONS:  - Assess patency of urinary catheter  - If patient has a chronic patel, consider changing catheter if non-functioning  - Follow guidelines for intermittent irrigation of non-functioning urinary catheter  Outcome: Progressing     Problem: Prexisting or High Potential for Compromised Skin Integrity  Goal: Skin integrity is maintained or improved  Description: INTERVENTIONS:  - Identify patients at risk for skin breakdown  - Assess and monitor skin integrity  - Assess and monitor nutrition and hydration status  - Monitor labs   - Assess for incontinence   - Turn and reposition patient  - Assist with mobility/ambulation  - Relieve pressure over bony prominences  - Avoid friction and shearing  - Provide appropriate hygiene as needed including keeping skin clean and dry  - Evaluate need for skin moisturizer/barrier cream  - Collaborate with interdisciplinary team   - Patient/family teaching  - Consider wound care consult   Outcome: Progressing

## 2023-02-12 NOTE — ASSESSMENT & PLAN NOTE
Sepsis vs sirs poa by tachycardia/leucocytosis 2* uti vs pna   (did have some schneider w/climbing a hill/easy fatigability but also has new urinary frequency  cxr negative for pna  procal negative x2  Blood culture no growth for 24 hours  Urine culture negative  Will monitor off of antibiotics  Likely sirs

## 2023-02-12 NOTE — PLAN OF CARE
Problem: Potential for Falls  Goal: Patient will remain free of falls  Description: INTERVENTIONS:  - Educate patient/family on patient safety including physical limitations  - Instruct patient to call for assistance with activity   - Consult OT/PT to assist with strengthening/mobility   - Keep Call bell within reach  - Keep bed low and locked with side rails adjusted as appropriate  - Keep care items and personal belongings within reach  - Initiate and maintain comfort rounds  - Apply yellow socks and bracelet for high fall risk patients  - Consider moving patient to room near nurses station  Outcome: Progressing     Problem: Nutrition/Hydration-ADULT  Goal: Nutrient/Hydration intake appropriate for improving, restoring or maintaining nutritional needs  Description: Monitor and assess patient's nutrition/hydration status for malnutrition  Collaborate with interdisciplinary team and initiate plan and interventions as ordered  Monitor patient's weight and dietary intake as ordered or per policy  Utilize nutrition screening tool and intervene as necessary  Determine patient's food preferences and provide high-protein, high-caloric foods as appropriate       INTERVENTIONS:  - Monitor oral intake, urinary output, labs, and treatment plans  - Assess nutrition and hydration status and recommend course of action  - Evaluate amount of meals eaten  - Assist patient with eating if necessary   - Allow adequate time for meals  - Recommend/ encourage appropriate diets, oral nutritional supplements, and vitamin/mineral supplements  - Order, calculate, and assess calorie counts as needed  - Recommend, monitor, and adjust tube feedings and TPN/PPN based on assessed needs  - Assess need for intravenous fluids  - Provide specific nutrition/hydration education as appropriate  - Include patient/family/caregiver in decisions related to nutrition  Outcome: Progressing     Problem: MOBILITY - ADULT  Goal: Maintain or return to baseline ADL function  Description: INTERVENTIONS:  -  Assess patient's ability to carry out ADLs; assess patient's baseline for ADL function and identify physical deficits which impact ability to perform ADLs (bathing, care of mouth/teeth, toileting, grooming, dressing, etc )  - Assess/evaluate cause of self-care deficits   - Assess range of motion  - Assess patient's mobility; develop plan if impaired  - Assess patient's need for assistive devices and provide as appropriate  - Encourage maximum independence but intervene and supervise when necessary  - Involve family in performance of ADLs  - Assess for home care needs following discharge   - Consider OT consult to assist with ADL evaluation and planning for discharge  - Provide patient education as appropriate  Outcome: Progressing  Goal: Maintains/Returns to pre admission functional level  Description: INTERVENTIONS:  - Perform BMAT or MOVE assessment daily    - Set and communicate daily mobility goal to care team and patient/family/caregiver     - Collaborate with rehabilitation services on mobility goals if consulted  - Out of bed for toileting  - Record patient progress and toleration of activity level   Outcome: Progressing     Problem: PAIN - ADULT  Goal: Verbalizes/displays adequate comfort level or baseline comfort level  Description: Interventions:  - Encourage patient to monitor pain and request assistance  - Assess pain using appropriate pain scale  - Administer analgesics based on type and severity of pain and evaluate response  - Implement non-pharmacological measures as appropriate and evaluate response  - Consider cultural and social influences on pain and pain management  - Notify physician/advanced practitioner if interventions unsuccessful or patient reports new pain  Outcome: Progressing     Problem: INFECTION - ADULT  Goal: Absence or prevention of progression during hospitalization  Description: INTERVENTIONS:  - Assess and monitor for signs and symptoms of infection  - Monitor lab/diagnostic results  - Monitor all insertion sites, i e  indwelling lines, tubes, and drains  - Monitor endotracheal if appropriate and nasal secretions for changes in amount and color  - [place] appropriate cooling/warming therapies per order  - Administer medications as ordered  - Instruct and encourage patient and family to use good hand hygiene technique  - Identify and instruct in appropriate isolation precautions for identified infection/condition  Outcome: Progressing  Goal: Absence of fever/infection during neutropenic period  Description: INTERVENTIONS:  - Monitor WBC    Outcome: Progressing     Problem: SAFETY ADULT  Goal: Patient will remain free of falls  Description: INTERVENTIONS:  - Educate patient/family on patient safety including physical limitations  - Instruct patient to call for assistance with activity   - Consult OT/PT to assist with strengthening/mobility   - Keep Call bell within reach  - Keep bed low and locked with side rails adjusted as appropriate  - Keep care items and personal belongings within reach  - Initiate and maintain comfort rounds  - Apply yellow socks and bracelet for high fall risk patients  - Consider moving patient to room near nurses station  Outcome: Progressing  Goal: Maintain or return to baseline ADL function  Description: INTERVENTIONS:  -  Assess patient's ability to carry out ADLs; assess patient's baseline for ADL function and identify physical deficits which impact ability to perform ADLs (bathing, care of mouth/teeth, toileting, grooming, dressing, etc )  - Assess/evaluate cause of self-care deficits   - Assess range of motion  - Assess patient's mobility; develop plan if impaired  - Assess patient's need for assistive devices and provide as appropriate  - Encourage maximum independence but intervene and supervise when necessary  - Involve family in performance of ADLs  - Assess for home care needs following discharge   - Consider OT consult to assist with ADL evaluation and planning for discharge  - Provide patient education as appropriate  Outcome: Progressing  Goal: Maintains/Returns to pre admission functional level  Description: INTERVENTIONS:  - Perform BMAT or MOVE assessment daily    - Set and communicate daily mobility goal to care team and patient/family/caregiver  - Collaborate with rehabilitation services on mobility goals if consulted  - Out of bed for toileting  - Record patient progress and toleration of activity level   Outcome: Progressing     Problem: DISCHARGE PLANNING  Goal: Discharge to home or other facility with appropriate resources  Description: INTERVENTIONS:  - Identify barriers to discharge w/patient and caregiver  - Arrange for needed discharge resources and transportation as appropriate  - Identify discharge learning needs (meds, wound care, etc )  - Arrange for interpretive services to assist at discharge as needed  - Refer to Case Management Department for coordinating discharge planning if the patient needs post-hospital services based on physician/advanced practitioner order or complex needs related to functional status, cognitive ability, or social support system  Outcome: Progressing     Problem: Knowledge Deficit  Goal: Patient/family/caregiver demonstrates understanding of disease process, treatment plan, medications, and discharge instructions  Description: Complete learning assessment and assess knowledge base    Interventions:  - Provide teaching at level of understanding  - Provide teaching via preferred learning methods  Outcome: Progressing     Problem: GENITOURINARY - ADULT  Goal: Maintains or returns to baseline urinary function  Description: INTERVENTIONS:  - Assess urinary function  - Encourage oral fluids to ensure adequate hydration if ordered  - Administer IV fluids as ordered to ensure adequate hydration  - Administer ordered medications as needed  - Offer frequent toileting  - Follow urinary retention protocol if ordered  Outcome: Progressing  Goal: Absence of urinary retention  Description: INTERVENTIONS:  - Assess patient’s ability to void and empty bladder  - Monitor I/O  - Bladder scan as needed  - Discuss with physician/AP medications to alleviate retention as needed  - Discuss catheterization for long term situations as appropriate  Outcome: Progressing  Goal: Urinary catheter remains patent  Description: INTERVENTIONS:  - Assess patency of urinary catheter  - If patient has a chronic patel, consider changing catheter if non-functioning  - Follow guidelines for intermittent irrigation of non-functioning urinary catheter  Outcome: Progressing     Problem: Prexisting or High Potential for Compromised Skin Integrity  Goal: Skin integrity is maintained or improved  Description: INTERVENTIONS:  - Identify patients at risk for skin breakdown  - Assess and monitor skin integrity  - Assess and monitor nutrition and hydration status  - Monitor labs   - Assess for incontinence   - Turn and reposition patient  - Assist with mobility/ambulation  - Relieve pressure over bony prominences  - Avoid friction and shearing  - Provide appropriate hygiene as needed including keeping skin clean and dry  - Evaluate need for skin moisturizer/barrier cream  - Collaborate with interdisciplinary team   - Patient/family teaching  - Consider wound care consult   Outcome: Progressing

## 2023-02-13 ENCOUNTER — TELEPHONE (OUTPATIENT)
Dept: ENDOCRINOLOGY | Facility: CLINIC | Age: 63
End: 2023-02-13

## 2023-02-13 LAB
ANION GAP SERPL CALCULATED.3IONS-SCNC: 5 MMOL/L (ref 4–13)
BUN SERPL-MCNC: 10 MG/DL (ref 5–25)
CALCIUM SERPL-MCNC: 8.4 MG/DL (ref 8.4–10.2)
CHLORIDE SERPL-SCNC: 111 MMOL/L (ref 96–108)
CO2 SERPL-SCNC: 28 MMOL/L (ref 21–32)
CREAT SERPL-MCNC: 0.98 MG/DL (ref 0.6–1.3)
ERYTHROCYTE [DISTWIDTH] IN BLOOD BY AUTOMATED COUNT: 17.8 % (ref 11.6–15.1)
GFR SERPL CREATININE-BSD FRML MDRD: 61 ML/MIN/1.73SQ M
GLUCOSE SERPL-MCNC: 107 MG/DL (ref 65–140)
GLUCOSE SERPL-MCNC: 162 MG/DL (ref 65–140)
GLUCOSE SERPL-MCNC: 183 MG/DL (ref 65–140)
GLUCOSE SERPL-MCNC: 226 MG/DL (ref 65–140)
GLUCOSE SERPL-MCNC: 88 MG/DL (ref 65–140)
HCT VFR BLD AUTO: 31.7 % (ref 34.8–46.1)
HGB BLD-MCNC: 9.5 G/DL (ref 11.5–15.4)
MCH RBC QN AUTO: 23.9 PG (ref 26.8–34.3)
MCHC RBC AUTO-ENTMCNC: 30 G/DL (ref 31.4–37.4)
MCV RBC AUTO: 80 FL (ref 82–98)
PLATELET # BLD AUTO: 317 THOUSANDS/UL (ref 149–390)
PMV BLD AUTO: 10.5 FL (ref 8.9–12.7)
POTASSIUM SERPL-SCNC: 3.3 MMOL/L (ref 3.5–5.3)
RBC # BLD AUTO: 3.98 MILLION/UL (ref 3.81–5.12)
SODIUM SERPL-SCNC: 144 MMOL/L (ref 135–147)
WBC # BLD AUTO: 11.02 THOUSAND/UL (ref 4.31–10.16)

## 2023-02-13 PROCEDURE — 97163 PT EVAL HIGH COMPLEX 45 MIN: CPT

## 2023-02-13 PROCEDURE — 80048 BASIC METABOLIC PNL TOTAL CA: CPT | Performed by: INTERNAL MEDICINE

## 2023-02-13 PROCEDURE — 97116 GAIT TRAINING THERAPY: CPT

## 2023-02-13 PROCEDURE — 97166 OT EVAL MOD COMPLEX 45 MIN: CPT

## 2023-02-13 PROCEDURE — 82948 REAGENT STRIP/BLOOD GLUCOSE: CPT

## 2023-02-13 PROCEDURE — 85027 COMPLETE CBC AUTOMATED: CPT | Performed by: INTERNAL MEDICINE

## 2023-02-13 PROCEDURE — 99232 SBSQ HOSP IP/OBS MODERATE 35: CPT | Performed by: FAMILY MEDICINE

## 2023-02-13 PROCEDURE — 97530 THERAPEUTIC ACTIVITIES: CPT

## 2023-02-13 RX ORDER — PREDNISONE 1 MG/1
4 TABLET ORAL DAILY
Qty: 120 TABLET | Refills: 0 | OUTPATIENT
Start: 2023-02-14 | End: 2023-02-17

## 2023-02-13 RX ORDER — FERROUS SULFATE TAB EC 324 MG (65 MG FE EQUIVALENT) 324 (65 FE) MG
324 TABLET DELAYED RESPONSE ORAL
Qty: 30 TABLET
Start: 2023-02-13 | End: 2023-02-17

## 2023-02-13 RX ORDER — METOPROLOL TARTRATE 50 MG/1
50 TABLET, FILM COATED ORAL EVERY 12 HOURS SCHEDULED
Qty: 60 TABLET | Refills: 0 | Status: SHIPPED | OUTPATIENT
Start: 2023-02-13 | End: 2023-02-17 | Stop reason: SDUPTHER

## 2023-02-13 RX ADMIN — INSULIN LISPRO 2 UNITS: 100 INJECTION, SOLUTION INTRAVENOUS; SUBCUTANEOUS at 11:44

## 2023-02-13 RX ADMIN — ASPIRIN 81 MG: 81 TABLET, COATED ORAL at 08:34

## 2023-02-13 RX ADMIN — DILTIAZEM HYDROCHLORIDE 480 MG: 240 CAPSULE, COATED, EXTENDED RELEASE ORAL at 08:34

## 2023-02-13 RX ADMIN — HEPARIN SODIUM 5000 UNITS: 5000 INJECTION INTRAVENOUS; SUBCUTANEOUS at 05:26

## 2023-02-13 RX ADMIN — HEPARIN SODIUM 5000 UNITS: 5000 INJECTION INTRAVENOUS; SUBCUTANEOUS at 22:52

## 2023-02-13 RX ADMIN — METOPROLOL TARTRATE 50 MG: 50 TABLET, FILM COATED ORAL at 22:52

## 2023-02-13 RX ADMIN — FUROSEMIDE 20 MG: 20 TABLET ORAL at 08:34

## 2023-02-13 RX ADMIN — INSULIN HUMAN 80 UNITS: 500 INJECTION, SOLUTION SUBCUTANEOUS at 08:40

## 2023-02-13 RX ADMIN — GABAPENTIN 100 MG: 100 CAPSULE ORAL at 17:16

## 2023-02-13 RX ADMIN — INSULIN LISPRO 2 UNITS: 100 INJECTION, SOLUTION INTRAVENOUS; SUBCUTANEOUS at 17:16

## 2023-02-13 RX ADMIN — LOSARTAN POTASSIUM 100 MG: 50 TABLET, FILM COATED ORAL at 08:34

## 2023-02-13 RX ADMIN — INSULIN LISPRO 4 UNITS: 100 INJECTION, SOLUTION INTRAVENOUS; SUBCUTANEOUS at 22:52

## 2023-02-13 RX ADMIN — ATORVASTATIN CALCIUM 40 MG: 40 TABLET, FILM COATED ORAL at 17:16

## 2023-02-13 RX ADMIN — BUDESONIDE AND FORMOTEROL FUMARATE DIHYDRATE 1 PUFF: 160; 4.5 AEROSOL RESPIRATORY (INHALATION) at 17:16

## 2023-02-13 RX ADMIN — INSULIN HUMAN 20 UNITS: 500 INJECTION, SOLUTION SUBCUTANEOUS at 17:16

## 2023-02-13 RX ADMIN — BUDESONIDE AND FORMOTEROL FUMARATE DIHYDRATE 1 PUFF: 160; 4.5 AEROSOL RESPIRATORY (INHALATION) at 08:34

## 2023-02-13 RX ADMIN — HEPARIN SODIUM 5000 UNITS: 5000 INJECTION INTRAVENOUS; SUBCUTANEOUS at 13:48

## 2023-02-13 RX ADMIN — GABAPENTIN 100 MG: 100 CAPSULE ORAL at 08:34

## 2023-02-13 RX ADMIN — PREDNISONE 4 MG: 1 TABLET ORAL at 08:34

## 2023-02-13 RX ADMIN — METOPROLOL TARTRATE 50 MG: 50 TABLET, FILM COATED ORAL at 08:34

## 2023-02-13 NOTE — UTILIZATION REVIEW
Initial Clinical Review    Admission: Date/Time/Statement:   Admission Orders (From admission, onward)     Ordered        02/11/23 0338  INPATIENT ADMISSION  Once                      Orders Placed This Encounter   Procedures   • INPATIENT ADMISSION     Standing Status:   Standing     Number of Occurrences:   1     Order Specific Question:   Level of Care     Answer:   Med Surg [16]     Order Specific Question:   Estimated length of stay     Answer:   More than 2 Midnights     Order Specific Question:   Certification     Answer:   I certify that inpatient services are medically necessary for this patient for a duration of greater than two midnights  See H&P and MD Progress Notes for additional information about the patient's course of treatment  ED Arrival Information     Expected   -    Arrival   2/10/2023 23:48    Acuity   Urgent            Means of arrival   Walk-In    Escorted by   Family Member    Service   Hospitalist    Admission type   Emergency            Arrival complaint   Chills             Chief Complaint   Patient presents with   • Medical Problem     Pt reports today started with chills, shakes and b/l leg pain        Initial Presentation: 57 y o  female presents to the ED from home with c/o urinary issues, worsening weakness, SOB, fall 2 wks PTA, dry cough  PMH: HTN, IDDM, Polymyalgia rheumatica, obesity w/bmi of 50, recent homelessness  In the ED labs - leukocytosis, elevated lactic acid, glucose, AST, negative troponins  Imaging - mild pulm venous congestion, no acute spinal disease, mod to severe DDD  On exam - no deficits  She was treated with IV fluids, IV antibiotics  She is HTN, tachycardic in ED  She is admitted to INPATIENT status with Sepsis - new urinary freq, elevated lactic acidosis -  blood and urine cultures  Weakness and BLE numbness - likely d/t L spine stenosis and recent fall down steps, therapy consult  Intertrigo - wound care consult, Nystatin powder to pannus  Abnormal UA - IV antibiotics  2/11 Neuro Consult - Gait dysfunction in setting of deconditioning/diabetic neuropathy/obesity/chronic LE swelling/polymyalgia rheumatica and now possible sepsis adding to baseline deconditioning  Poor sleep likely contributing as well  Low clinical suspicion for acute cva  Aqua K pad, follow cultures, therapy evals, CPAP update, therapy evals  Date: 2/12   Day 2:   contines with skin breakdown pannus  Waiting therapy evals  Continue OP Prednisone  Adding Metoprolol to current Cardizem and Losartan for Tachycardia and HTN  Cultures negative can stop IV antibiotics Today  Date: 2/13  Day 3:   Written for d/c to home, will get walker out of storage and do OP PT        ED Triage Vitals   Temperature Pulse Respirations Blood Pressure SpO2   02/10/23 2357 02/10/23 2359 02/10/23 2359 02/10/23 2359 02/10/23 2359   98 7 °F (37 1 °C) (!) 118 18 (!) 195/108 99 %      Temp Source Heart Rate Source Patient Position - Orthostatic VS BP Location FiO2 (%)   02/10/23 2357 02/10/23 2359 02/10/23 2359 02/10/23 2359 --   Oral Monitor Sitting Right arm       Pain Score       02/11/23 0549       No Pain          Wt Readings from Last 1 Encounters:   02/11/23 (!) 153 kg (338 lb 3 oz)     Additional Vital Signs:   02/13/23 11:16:30 98 3 °F (36 8 °C) 69 14 136/71 93 97 % -- --   02/13/23 07:52:55 98 2 °F (36 8 °C) 70 16 149/77 101 95 % -- --   02/12/23 22:27:13 97 7 °F (36 5 °C) 72 -- 149/77 101 92 % -- --   02/12/23 19:09:22 98 2 °F (36 8 °C) 75 -- 148/77 101 93 % -- --   02/12/23 15:06:35 98 2 °F (36 8 °C) 68 18 144/75 98 94 % -- --   02/12/23 11:51:12 98 3 °F (36 8 °C) 66 18 144/75 98 95 % -- --   02/12/23 07:45:38 98 3 °F (36 8 °C) 70 19 152/75 101 95 % -- --   02/11/23 21:25:07 98 2 °F (36 8 °C) 79 18 148/76 100 97 % None (Room air) Lying   02/11/23 16:00:43 97 9 °F (36 6 °C) 78 17 148/73 98 95 % -- --   02/11/23 11:21:36 98 °F (36 7 °C) 100 17 188/100 Abnormal  129 96 % -- -- 02/11/23 08:04:05 97 7 °F (36 5 °C) 106 Abnormal  18 168/86 113 96 % -- --   02/11/23 05:46:06 98 3 °F (36 8 °C) 111 Abnormal  18 170/85 113 97 % None (Room air) --   02/11/23 0430 -- 111 Abnormal  20 169/73 111 99 % None (Room air) Lying   02/11/23 0400 -- 104 16 147/70 102 98 % None (Room air) Lying   02/11/23 0345 -- 105 18 162/76 109 99 % None (Room air) Lying   02/11/23 0330 -- 110 Abnormal  19 179/79 Abnormal  114 99 % None (Room air) Lying   02/11/23 0311 -- 104 18 159/76 -- 100 % None (Room air) Lying     Pertinent Labs/Diagnostic Test Results:     2/11 ECG - ST     2/11 ECG - Sinus tachycardia with occasional Premature ventricular complexes  Nonspecific ST abnormality  Abnormal ECG    CT head wo contrast   Final Result by Nadiya Hong DO (02/11 0518)      No acute intracranial abnormality  Workstation performed: [id number]         CT spine lumbar wo contrast   Final Result by Nadiya Hong DO (02/11 5726)      No acute osseous abnormality  Multilevel degenerative changes, as described, with moderate to severe degenerative changes at L4-L5 and L5-S1               Workstation performed: [id number]         CT spine cervical wo contrast   Final Result by Nadiya Hong DO (02/11 0522)      No cervical spine fracture or traumatic malalignment  Workstation performed: [id number]         XR chest portable   ED Interpretation by Salvatore Veras DO (02/11 0153)   Questionable right lower lung field consolidation versus atelectasis      Final Result by Price Bello MD (02/11 0730)      Mild cardiomegaly  Question mild pulmonary venous congestion                    Workstation performed: [id number]         VAS lower limb venous duplex study, complete bilateral    (Results Pending)        Results from last 7 days   Lab Units 02/11/23  0118   SARS-COV-2  Negative     Results from last 7 days   Lab Units 02/13/23  0526 02/12/23  0513 02/11/23  0106 02/09/23  0350 WBC Thousand/uL 11 02* 9 75 13 97* 11 06*   HEMOGLOBIN g/dL 9 5* 8 9* 10 2* 10 4*   HEMATOCRIT % 31 7* 30 4* 34 8 34 7*   PLATELETS Thousands/uL 317 298 363 381   NEUTROS ABS Thousands/µL  --  6 22 10 82* 7 10         Results from last 7 days   Lab Units 02/13/23  0526 02/12/23  0513 02/11/23  0106 02/09/23  0350   SODIUM mmol/L 144 143 142 140   POTASSIUM mmol/L 3 3* 3 5 3 7 3 5   CHLORIDE mmol/L 111* 110* 108 105   CO2 mmol/L 28 27 26 27   ANION GAP mmol/L 5 6 8 8   BUN mg/dL 10 12 17 18   CREATININE mg/dL 0 98 0 98 1 30 1 12   EGFR ml/min/1 73sq m 61 61 43 52   CALCIUM mg/dL 8 4 8 2* 9 1 8 9     Results from last 7 days   Lab Units 02/11/23  0106 02/09/23  0350   AST U/L 13 9*   ALT U/L 14 11   ALK PHOS U/L 114* 118*   TOTAL PROTEIN g/dL 6 9 6 9   ALBUMIN g/dL 3 4* 3 5   TOTAL BILIRUBIN mg/dL 0 42 0 46     Results from last 7 days   Lab Units 02/13/23  1111 02/13/23  0749 02/12/23 2124 02/12/23  1631 02/12/23  1150 02/12/23  0744 02/11/23  2124 02/11/23  1600 02/11/23  1122 02/11/23  0843 02/11/23  0803 02/09/23  0647   POC GLUCOSE mg/dl 162* 107 146* 176* 198* 192* 257* 329* 140 85 48* 297*     Results from last 7 days   Lab Units 02/13/23  0526 02/12/23  0513 02/11/23  0106 02/09/23  0350   GLUCOSE RANDOM mg/dL 88 206* 142* 318*             BETA-HYDROXYBUTYRATE   Date Value Ref Range Status   02/09/2023 0 2 <0 6 mmol/L Final   03/05/2020 0 1 <0 6 mmol/L Final          Results from last 7 days   Lab Units 02/09/23  0350   PH PAIGE  7 332   PCO2 PAIGE mm Hg 48 1   PO2 PAIGE mm Hg 42 8   HCO3 PAIGE mmol/L 24 9   BASE EXC PAIGE mmol/L -1 3   O2 CONTENT PAIGE ml/dL 12 4   O2 HGB, VENOUS % 74 0             Results from last 7 days   Lab Units 02/11/23  0516 02/11/23  0310 02/11/23 0106   HS TNI 0HR ng/L  --   --  7   HS TNI 2HR ng/L  --  7  --    HSTNI D2 ng/L  --  0  --    HS TNI 4HR ng/L 8  --   --    HSTNI D4 ng/L 1  --   --          Results from last 7 days   Lab Units 02/11/23 0106   PROTIME seconds 13 7   INR  1 05 PTT seconds 30         Results from last 7 days   Lab Units 02/12/23  0513 02/11/23  0106   PROCALCITONIN ng/ml 0 06 0 08     Results from last 7 days   Lab Units 02/11/23  0516 02/11/23  0310 02/11/23  0106   LACTIC ACID mmol/L 1 5 2 1* 2 4*     Results from last 7 days   Lab Units 02/09/23  0350   LIPASE u/L 12     Results from last 7 days   Lab Units 02/11/23  0246   CLARITY UA  Clear   COLOR UA  Yellow   SPEC GRAV UA  >=1 030   PH UA  6 0   GLUCOSE UA mg/dl 500 (1/2%)*   KETONES UA mg/dl Negative   BLOOD UA  Small*   PROTEIN UA mg/dl >=300*   NITRITE UA  Negative   BILIRUBIN UA  Negative   UROBILINOGEN UA E U /dl 0 2   LEUKOCYTES UA  Negative   WBC UA /hpf 2-4   RBC UA /hpf 0-1*   BACTERIA UA /hpf Innumerable*   EPITHELIAL CELLS WET PREP /hpf Occasional   MUCUS THREADS  Occasional*     Results from last 7 days   Lab Units 02/11/23  0118   INFLUENZA A PCR  Negative   INFLUENZA B PCR  Negative   RSV PCR  Negative     Results from last 7 days   Lab Units 02/11/23  0246 02/11/23  0208 02/11/23  0106   BLOOD CULTURE   --  No Growth at 48 hrs  No Growth at 48 hrs     URINE CULTURE  No Growth <1000 cfu/mL  --   --                ED Treatment:   Medication Administration from 02/10/2023 2348 to 02/11/2023 0535       Date/Time Order Dose Route Action     02/11/2023 0127 EST sodium chloride 0 9 % bolus 1,000 mL 1,000 mL Intravenous New Bag     02/11/2023 0243 EST cefTRIAXone (ROCEPHIN) IVPB (premix in dextrose) 2,000 mg 50 mL 2,000 mg Intravenous New Bag     02/11/2023 0339 EST azithromycin (ZITHROMAX) 500 mg in sodium chloride 0 9% 250mL IVPB 500 mg 500 mg Intravenous New Bag     02/11/2023 0518 EST multi-electrolyte (ISOLYTE-S PH 7 4) bolus 500 mL 500 mL Intravenous New Bag        Past Medical History:   Diagnosis Date   • Anemia    • Arthritis    • Diabetes mellitus ([facility] [address] )    • Dyspnea on exertion    • Hyperlipidemia    • Hypertension    • Legally blind 2010   • Mild intermittent asthma with exacerbation 8/4/2018   • Miscarriage     x 3   • Polymyalgia rheumatica (HCC) 11/24/2021   • Seizures ([facility])    • Sickle cell trait ([facility])    • Sleep apnea    • Stage 3a chronic kidney disease ([place] [address] ) 5/19/2022   • Thyroid nodule 3/6/2020     Present on Admission:  • Benign hypertension  • Diabetes mellitus ([place] [address] )  • Morbid obesity ([place] [address] )  • Polymyalgia rheumatica ([facility])      Admitting Diagnosis: Pneumonia [J18 9]  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]  Sepsis ([place] [address] ) [A41 9]  Age/Sex: 57 y o  female  Admission Orders:  Scheduled Medications:  aspirin, 81 mg, Oral, Daily  atorvastatin, 40 mg, Oral, Daily With Dinner  budesonide-formoterol, 1 puff, Inhalation, BID  diltiazem, 480 mg, Oral, Daily  furosemide, 20 mg, Oral, Daily  gabapentin, 100 mg, Oral, BID  heparin (porcine), 5,000 Units, Subcutaneous, Q8H [facility]  insulin lispro, 2-12 Units, Subcutaneous, 4x Daily (AC & HS)  insulin regular, 20 Units, Subcutaneous, Before Dinner  insulin regular, 80 Units, Subcutaneous, Daily Before Breakfast  losartan, 100 mg, Oral, Daily  metoprolol tartrate, 50 mg, Oral, Q12H RONI  predniSONE, 4 mg, Oral, Daily      Continuous IV Infusions:  IV Fluids @ 75 ml/hr - d/c 2/12      PRN Meds:  acetaminophen, 650 mg, Oral, Q6H PRN  ammonium lactate, , Topical, BID PRN - x 1 2/12  labetalol, 10 mg, Intravenous, Q6H PRN  ondansetron, 4 mg, Intravenous, Q6H PRN    VS q 4 hr  Neuro checks q 4 hr   POC GLUCOSE AC/HS WITH SSI COVERAGE   Elevate BLE   Skin care buttocks, panus, sacrum  IP CONSULT TO NEUROLOGY  IP CONSULT TO CASE MANAGEMENT    Network Utilization Review Department  ATTENTION: Please call with any questions or concerns to [phone] and carefully listen to the prompts so that you are directed to the right person   All voicemails are confidential   Mami Montelongo all requests for admission clinical reviews, approved or denied determinations and any other requests to dedicated fax number below belonging to the campus where the patient is receiving treatment   List of dedicated fax numbers for the Facilities:  [address] [facility] [address] DENIALS (Administrative/Medical Necessity) [phone]   [address] (Maternity/NICU/Pediatrics) [phone]   0 Lisa Moran [phone]   [address] [phone]   [address] [place] [address] [facility] [phone]   [address] [facility] Kelsi Pierce [facility] [address] 8[address] [facility] [phone]

## 2023-02-13 NOTE — PLAN OF CARE
Problem: OCCUPATIONAL THERAPY ADULT  Goal: Performs self-care activities at highest level of function for planned discharge setting  See evaluation for individualized goals  Description: Treatment Interventions: ADL retraining, Functional transfer training, UE strengthening/ROM, Endurance training, Patient/family training, Equipment evaluation/education, Neuromuscular reeducation, Compensatory technique education, Energy conservation, Activityengagement          See flowsheet documentation for full assessment, interventions and recommendations  Note: Limitation: Decreased ADL status, Decreased UE strength, Decreased Safe judgement during ADL, Decreased endurance, Decreased self-care trans  Prognosis: Good  Assessment: Anayeli Trevino is a 57 y o  female who presents with pmh of PMR, htn IDDM, obesity w/bmi of 48 recent homelessness coming to hospital for urinary issues/worsening weakness and sob  Fall about 2 weeks ago (appears to 1/13/23 by emr) after she fell down 6 stairs  She was evaluated and cleared for d/c in ed w/o any focal neurodeficits  Pt admitted for medical management of weakness  Pt with active orders for OT  Prior to admission, pt has been staying at [address] [place] [address] homeless shelter with son  There are no stairs to enter shelter  Pt was I with ADLS and mobility  Ambulates with cane and RW  Reports 2 falls in the past 6 months  reports she has been looking for a new apartment  Upon evaluation, pt presenting below functional baseline with deficits noted in strength, activity tolerance, balance, safety awareness which is limiting pts functional ability to complete ADLs/ IADLs, functional transfers and functional mobility  Pt current level of function: bed mobility - supervision; transfers- supervision; functional mobility-supervision with RW - excessively slow gait; UB dressing / bathing - supervision; LB dressing/ bathing- Maureen; toileting - supervision    Pt would benefit from skilled OT 2-3x/wk to maximize functional independence  OT DC recommendation: HH vs OP therapy services       OT Discharge Recommendation: Home with home health rehabilitation (va outpatient)

## 2023-02-13 NOTE — DISCHARGE INSTR - OTHER ORDERS
Wound Care Plan:   1-Hydraguard lotion to bilateral buttocks and sacrum twice daily and as needed with any incontinence care  2-Turn and reposition every 2 hours while in bed and weight shift frequently while in the chair to re-distribute pressure on skin  Provide assistance as needed  3-Bariatric offloading air cushion in chair when out of bed  4-Moisturize skin daily with lotion  5-Lac-hydrin lotion to lower extremities twice daily  6-Elevate lower extremities for edema management as often as possible  Ensure heels are floating off of bed/chair surface to offload pressure  7-Wear your compression stockings--put on in the morning and remove before going to bed

## 2023-02-13 NOTE — PLAN OF CARE
Problem: Potential for Falls  Goal: Patient will remain free of falls  Description: INTERVENTIONS:  - Educate patient/family on patient safety including physical limitations  - Instruct patient to call for assistance with activity   - Consult OT/PT to assist with strengthening/mobility   - Keep Call bell within reach  - Keep bed low and locked with side rails adjusted as appropriate  - Keep care items and personal belongings within reach  - Initiate and maintain comfort rounds  - Make Fall Risk Sign visible to staff  - Apply yellow socks and bracelet for high fall risk patients  - Consider moving patient to room near nurses station  Outcome: Progressing     Problem: Nutrition/Hydration-ADULT  Goal: Nutrient/Hydration intake appropriate for improving, restoring or maintaining nutritional needs  Description: Monitor and assess patient's nutrition/hydration status for malnutrition  Collaborate with interdisciplinary team and initiate plan and interventions as ordered  Monitor patient's weight and dietary intake as ordered or per policy  Utilize nutrition screening tool and intervene as necessary  Determine patient's food preferences and provide high-protein, high-caloric foods as appropriate       INTERVENTIONS:  - Monitor oral intake, urinary output, labs, and treatment plans  - Assess nutrition and hydration status and recommend course of action  - Evaluate amount of meals eaten  - Assist patient with eating if necessary   - Allow adequate time for meals  - Recommend/ encourage appropriate diets, oral nutritional supplements, and vitamin/mineral supplements  - Order, calculate, and assess calorie counts as needed  - Recommend, monitor, and adjust tube feedings and TPN/PPN based on assessed needs  - Assess need for intravenous fluids  - Provide specific nutrition/hydration education as appropriate  - Include patient/family/caregiver in decisions related to nutrition  Outcome: Progressing     Problem: MOBILITY - ADULT  Goal: Maintain or return to baseline ADL function  Description: INTERVENTIONS:  - Educate patient/family on patient safety including physical limitations  - Instruct patient to call for assistance with activity   - Consult OT/PT to assist with strengthening/mobility   - Keep Call bell within reach  - Keep bed low and locked with side rails adjusted as appropriate  - Keep care items and personal belongings within reach  - Initiate and maintain comfort rounds  - Make Fall Risk Sign visible to staff  - Apply yellow socks and bracelet for high fall risk patients  - Consider moving patient to room near nurses station  Outcome: Progressing  Goal: Maintains/Returns to pre admission functional level  Description: INTERVENTIONS:  - Perform BMAT or MOVE assessment daily    - Set and communicate daily mobility goal to care team and patient/family/caregiver     - Collaborate with rehabilitation services on mobility goals if consulted  - Out of bed for toileting  - Record patient progress and toleration of activity level   Outcome: Progressing     Problem: PAIN - ADULT  Goal: Verbalizes/displays adequate comfort level or baseline comfort level  Description: Interventions:  - Encourage patient to monitor pain and request assistance  - Assess pain using appropriate pain scale  - Administer analgesics based on type and severity of pain and evaluate response  - Implement non-pharmacological measures as appropriate and evaluate response  - Consider cultural and social influences on pain and pain management  - Notify physician/advanced practitioner if interventions unsuccessful or patient reports new pain  Outcome: Progressing     Problem: INFECTION - ADULT  Goal: Absence or prevention of progression during hospitalization  Description: INTERVENTIONS:  - Assess and monitor for signs and symptoms of infection  - Monitor lab/diagnostic results  - Monitor all insertion sites, i e  indwelling lines, tubes, and drains  - Monitor endotracheal if appropriate and nasal secretions for changes in amount and color  - [place] appropriate cooling/warming therapies per order  - Administer medications as ordered  - Instruct and encourage patient and family to use good hand hygiene technique  - Identify and instruct in appropriate isolation precautions for identified infection/condition  Outcome: Progressing  Goal: Absence of fever/infection during neutropenic period  Description: INTERVENTIONS:  - Monitor WBC    Outcome: Progressing     Problem: SAFETY ADULT  Goal: Patient will remain free of falls  Description: INTERVENTIONS:  - Educate patient/family on patient safety including physical limitations  - Instruct patient to call for assistance with activity   - Consult OT/PT to assist with strengthening/mobility   - Keep Call bell within reach  - Keep bed low and locked with side rails adjusted as appropriate  - Keep care items and personal belongings within reach  - Initiate and maintain comfort rounds  - Make Fall Risk Sign visible to staff  - Apply yellow socks and bracelet for high fall risk patients  - Consider moving patient to room near nurses station  Outcome: Progressing  Goal: Maintain or return to baseline ADL function  Description: INTERVENTIONS:  - Educate patient/family on patient safety including physical limitations  - Instruct patient to call for assistance with activity   - Consult OT/PT to assist with strengthening/mobility   - Keep Call bell within reach  - Keep bed low and locked with side rails adjusted as appropriate  - Keep care items and personal belongings within reach  - Initiate and maintain comfort rounds  - Make Fall Risk Sign visible to staff  - Apply yellow socks and bracelet for high fall risk patients  - Consider moving patient to room near nurses station  Outcome: Progressing  Goal: Maintains/Returns to pre admission functional level  Description: INTERVENTIONS:  - Perform BMAT or MOVE assessment daily    - Set and communicate daily mobility goal to care team and patient/family/caregiver  - Collaborate with rehabilitation services on mobility goals if consulted  - Out of bed for toileting  - Record patient progress and toleration of activity level   Outcome: Progressing     Problem: DISCHARGE PLANNING  Goal: Discharge to home or other facility with appropriate resources  Description: INTERVENTIONS:  - Identify barriers to discharge w/patient and caregiver  - Arrange for needed discharge resources and transportation as appropriate  - Identify discharge learning needs (meds, wound care, etc )  - Arrange for interpretive services to assist at discharge as needed  - Refer to Case Management Department for coordinating discharge planning if the patient needs post-hospital services based on physician/advanced practitioner order or complex needs related to functional status, cognitive ability, or social support system  Outcome: Progressing     Problem: Knowledge Deficit  Goal: Patient/family/caregiver demonstrates understanding of disease process, treatment plan, medications, and discharge instructions  Description: Complete learning assessment and assess knowledge base    Interventions:  - Provide teaching at level of understanding  - Provide teaching via preferred learning methods  Outcome: Progressing     Problem: GENITOURINARY - ADULT  Goal: Maintains or returns to baseline urinary function  Description: INTERVENTIONS:  - Assess urinary function  - Encourage oral fluids to ensure adequate hydration if ordered  - Administer IV fluids as ordered to ensure adequate hydration  - Administer ordered medications as needed  - Offer frequent toileting  - Follow urinary retention protocol if ordered  Outcome: Progressing  Goal: Absence of urinary retention  Description: INTERVENTIONS:  - Assess patient's ability to void and empty bladder  - Monitor I/O  - Bladder scan as needed  - Discuss with physician/AP medications to alleviate retention as needed  - Discuss catheterization for long term situations as appropriate  Outcome: Progressing     Problem: Prexisting or High Potential for Compromised Skin Integrity  Goal: Skin integrity is maintained or improved  Description: INTERVENTIONS:  - Identify patients at risk for skin breakdown  - Assess and monitor skin integrity  - Assess and monitor nutrition and hydration status  - Monitor labs   - Assess for incontinence   - Turn and reposition patient  - Assist with mobility/ambulation  - Relieve pressure over bony prominences  - Avoid friction and shearing  - Provide appropriate hygiene as needed including keeping skin clean and dry  - Evaluate need for skin moisturizer/barrier cream  - Collaborate with interdisciplinary team   - Patient/family teaching  - Consider wound care consult   Outcome: Progressing

## 2023-02-13 NOTE — WOUND OSTOMY CARE
Consult Note - Wound   Malachi Flair 57 y o  female MRN: [id number]  Unit/Bed#: [place] [id number] Encounter: [id number]      History and Present Illness:  57year old female presented to the hospital with weakness, shortness of breath, and recent fall  Patient's history significant for DM, obesity  Assessment Findings:   Patient agreeable to assessment  Able to turn in bed independently  Reports occasional urinary incontinence  Bilateral buttocks, sacrum, and heels intact  Abdominal, groin, and breast folds intact--no redness or open areas  Bilateral lower extremities with moderate edema and chronic venous changes (scaling, hyperpigmentation)--lac hydrin lotion ordered--patient reports they have drastically improved since admission  She reports she wears compression stockings as an outpatient, but does not elevate her legs  Instructed patient to elevate her legs throughout the day and wear her compression stockings (put on in the AM and remove at bedtime) once discharge  Scabbed abrasion to left knee--per patient from recent fall at home  Wound Care Plan:   1-Hydraguard lotion to bilateral buttocks and sacrum twice daily and as needed with any incontinence care  2-Encourage/remind patient to turn and reposition every 2 hours while in bed and weight shift frequently while in the chair to re-distribute pressure on skin  Provide assistance as needed  3-Bariatric offloading air cushion in chair when out of bed (obtain from storeroom)  4-Moisturize skin daily with skin nourishing cream   5-Lac-hydrin lotion to lower extremities  6-Elevate lower extremities for edema management  Ensure heels are floating off of bed/chair surface to offload pressure  Wound care team will sign-off at this time  Plan of care reviewed with primary RN        Toney FLORESN, RN, [employer]

## 2023-02-13 NOTE — ASSESSMENT & PLAN NOTE
B/l leg numbness which is more chronic in nature w/recent worsening of b/l leg weakness since fall approx 1 mo ago (pt describes this as 2 weeks ago but was evaluated on 1/13/23 at this facility) now w/worsening weakness in lower legs b/l by pt report x~ 2 weeks  · Per PT/OT eval appropriate for discharge home with home care - per CM based on patient's insurance and patient staying at homeless shelter will discharge to shelter with outpatient PT/OT

## 2023-02-13 NOTE — OCCUPATIONAL THERAPY NOTE
Occupational Therapy Evaluation     Patient Name: Milton Leavitt  [id number] Date: 2/13/2023  Problem List  Principal Problem:    Weakness  Active Problems:    Benign hypertension    Morbid obesity ([facility] )    Diabetes mellitus ([facility] )    Polymyalgia rheumatica (HCC)    Sepsis (HCC)    Abnormal urinalysis    Intertrigo    Leg numbness    Past Medical History  Past Medical History:   Diagnosis Date    Anemia     Arthritis     Diabetes mellitus ([facility] )     Dyspnea on exertion     Hyperlipidemia     Hypertension     Legally blind 2010    Mild intermittent asthma with exacerbation 8/4/2018    Miscarriage     x 3    Polymyalgia rheumatica ([facility] ) 11/24/2021    Seizures ([facility] )     Sickle cell trait ([facility] )     Sleep apnea     Stage 3a chronic kidney disease ([facility] ) 5/19/2022    Thyroid nodule 3/6/2020     Past Surgical History  Past Surgical History:   Procedure Laterality Date    CATARACT EXTRACTION Bilateral     august and september    EYE SURGERY      HYSTERECTOMY      37    OOPHORECTOMY Left     37    NC LIGATION/BIOPSY TEMPORAL ARTERY Bilateral 10/17/2019    Procedure: BIOPSY ARTERY TEMPORAL;  Surgeon: Gabriel Martinez DO;  Location: [facility];  Service: Vascular    US GUIDED THYROID BIOPSY  5/13/2020 02/13/23 1023   OT Last Visit   OT Visit Date 02/13/23   Note Type   Note type Evaluation   Additional Comments Pt greeted in supine and agreeable to skilled OT evaluation  Pain Assessment   Pain Assessment Tool 0-10   Pain Score 10 - Worst Possible Pain   Pain Location/Orientation Location: Pelvis   Restrictions/Precautions   Weight Bearing Precautions Per Order No   Other Precautions Multiple lines; Fall Risk;Pain   Home Living   Type of Home Homeless  (has been staying [address] )   Home Layout One level  (no JESSICA)   [address] Walker;Cane   Prior Function   Level of [place] Independent with functional mobility; Independent with ADLs   [facility] last [facility] 1 to 4   Comments Prior to admission, pt has been staying at [address] [place] [address] homeless shelter with son  There are no stairs to enter shelter  Pt was I with ADLS (occasional support for LB ADLS) and mobility  Ambulates with cane and RW  Reports 2 falls in the past 6 months  reports she has been looking for a new apartment  Lifestyle   Autonomy I with ADLs and mobility  Reciprocal Relationships Son   ADL   Where Assessed Edge of bed   Eating Assistance 7  Independent   Grooming Assistance 6  Modified Independent   UB Bathing Assistance 5  Supervision/Setup   LB Bathing Assistance 4  Minimal Assistance   UB Dressing Assistance 5  Supervision/Setup   LB Dressing Assistance 3  Moderate [address]  5  Supervision/Setup   Bed Mobility   Supine to Sit 5  Supervision   Additional items HOB elevated; Bedrails; Increased time required;Verbal cues   Additional Comments pt left seated up in chair following session  call light in reach  Transfers   Sit to Stand 5  Supervision   Additional items Bedrails;Armrests; Increased time required;Verbal cues   Stand to Sit 5  Supervision   Additional items Bedrails;Armrests; Increased time required;Verbal cues   Toilet transfer 5  Supervision   Additional items Increased time required;Standard toilet;Verbal cues   Functional Mobility   Functional Mobility 5  Supervision   Additional Comments assist x1  RW, excessivly slow     Additional items Rolling walker   Balance   Static Sitting Good   Dynamic Sitting Fair +   Static Standing Fair   Dynamic Standing Fair -   Ambulatory Fair -   Activity Tolerance   Activity Tolerance Patient tolerated treatment well   Medical Staff Made Aware PT Lacey   Nurse Made Aware BRISA Torres   RUROBBIE Assessment   RUE Assessment WFL   LUE Assessment   LUE Assessment WFL   Hand Function   Gross Motor Coordination Functional   Fine Motor Coordination Functional   Sensation   Light Touch No apparent deficits   Proprioception   Proprioception No apparent deficits   Vision-Basic Assessment   Current Vision Wears glasses only for reading   Psychosocial   Psychosocial (WDL) WDL   Cognition   Overall Cognitive Status WFL   Arousal/Participation Cooperative   Attention Within functional limits   Orientation Level Oriented X4   Memory Within functional limits   Following Commands Follows all commands and directions without difficulty   Comments pleasant and cooperative  Assessment   Limitation Decreased ADL status; Decreased UE strength;Decreased Safe judgement during ADL;Decreased endurance;Decreased self-care trans   Prognosis Good   Assessment Claribel Harp is a 57 y o  female who presents with pmh of PMR, htn IDDM, obesity w/bmi of 48 recent homelessness coming to hospital for urinary issues/worsening weakness and sob  Fall about 2 weeks ago (appears to 1/13/23 by emr) after she fell down 6 stairs  She was evaluated and cleared for d/c in ed w/o any focal neurodeficits  Pt admitted for medical management of weakness  Pt with active orders for OT  Prior to admission, pt has been staying at [address] [place] [address] homeless shelter with son  There are no stairs to enter shelter  Pt was I with ADLS and mobility  Ambulates with cane and RW  Reports 2 falls in the past 6 months  reports she has been looking for a new apartment  Upon evaluation, pt presenting below functional baseline with deficits noted in strength, activity tolerance, balance, safety awareness which is limiting pts functional ability to complete ADLs/ IADLs, functional transfers and functional mobility  Pt current level of function: bed mobility - supervision; transfers- supervision; functional mobility-supervision with RW - excessively slow gait; UB dressing / bathing - supervision; LB dressing/ bathing- Maureen; toileting - supervision  Pt would benefit from skilled OT 2-3x/wk to maximize functional independence  OT CHELY recommendation: HH vs OP therapy services  Goals   STG Time Frame 3-5   Short Term Goal #1 Pt will improve activity tolerance to G for min 30 min txment sessions for increase engagement in functional tasks   Short Term Goal #2 Pt will complete LB dressing/self care w/ mod I using adaptive device and DME as needed   Short Term Goal  Pt will complete toileting w/ mod I w/ G hygiene/thoroughness using DME as needed   LTG Time Frame 10-14   Long Term Goal #1 Pt will improve functional transfers to Mod I on/off all surfaces using DME as needed w/ G balance/safety   Long Term Goal #2 Pt will improve functional mobility during ADL/IADL/leisure tasks to Mod I using DME as needed w/ G balance/safety   Long Term Goal Pt will demonstrate 100% carryover of energy conservation techniques t/o functional I/ADL/leisure tasks w/o cues s/p skilled education to increase endurance during functional tasks   Plan   Treatment Interventions ADL retraining;Functional transfer training;UE strengthening/ROM; Endurance training;Patient/family training;Equipment evaluation/education; Neuromuscular reeducation; Compensatory technique education; Energy conservation; Activityengagement   Goal Expiration Date 02/27/23   OT Treatment Day 0   OT Frequency 2-3x/wk   Recommendation   OT Discharge Recommendation Home with home health rehabilitation  (va outpatient)   Additional Comments  The patient's raw score on the AM-PAC Daily Activity Inpatient Short Form is 20  A raw score of greater than or equal to 19 suggests the patient may benefit from discharge to home  Please refer to the recommendation of the Occupational Therapist for safe discharge planning     AM-PAC Daily Activity Inpatient   Lower Body Dressing 2   Bathing 3   Toileting 3   Upper Body Dressing 4   Grooming 4   Eating 4   Daily Activity Raw Score 20   Daily Activity Standardized Score (Calc for Raw Score >=11) 42 03   AM-PAC Applied Cognition Inpatient   Following a Speech/Presentation 4   Understanding Ordinary Conversation 4 Taking Medications 4   Remembering Where Things Are Placed or Put Away 4   Remembering List of 4-5 Errands 4   Taking Care of Complicated Tasks 4   Applied Cognition Raw Score 24   Applied Cognition Standardized Score 62 21   John Evangelista, OT

## 2023-02-13 NOTE — ASSESSMENT & PLAN NOTE
Lab Results   Component Value Date    HGBA1C 8 4 (A) 08/25/2022       Recent Labs     02/12/23  1631 02/12/23  2124 02/13/23  0749 02/13/23  1111   POCGLU 176* 146* 107 162*       Blood Sugar Average: Last 72 hrs:  (P) 167 [id number]     On metformin and human R U-500 80 units every morning and 20 to 40 units every afternoon before dinner  Continue home regimen

## 2023-02-13 NOTE — PLAN OF CARE
Problem: PHYSICAL THERAPY ADULT  Goal: Performs mobility at highest level of function for planned discharge setting  See evaluation for individualized goals  Description: Treatment/Interventions: Functional transfer training, Elevations, Therapeutic exercise, Endurance training, Patient/family training, Equipment eval/education, Bed mobility, Gait training, Compensatory technique education, Spoke to nursing, OT, Family (balance training)  Equipment Recommended: Ara Innocent       See flowsheet documentation for full assessment, interventions and recommendations  Note: Prognosis: Fair  Problem List: Decreased endurance, Impaired balance, Decreased mobility, Impaired vision, Obesity, Pain  Assessment: Pt is 57 y o  female seen for a high complexity PT evaluation s/p admit to [facility]  on 2/11/2023  Pt presenting w/ weakness, chills, shakes; pt w/ sepsis vs SIRS  Please see above for other active problem list / PMH  PT now consulted to assess functional mobility and needs for safe d/c planning  Prior to admission, pt was Radha w/ mobility w/ RW vs SPC, admits to increasing difficulty w/ transfers, currently staying at a homeless shelter w/o stairs to negotiate  Currently pt is S for bed skills; S for functional transfers; S for ambulation w/ RW  Pt presents w/ overall mobility deficits 2* to: generalized weakness/deconditioning; decreased endurance; impaired balance; gait deviations; obesity; fatigue  These impairments place pt at risk for falls  Pt will continue to benefit from skilled PT interventions to address stated impairments; to maximize functional potential; for ongoing pt/ family training; and DME needs  PT is currently recommending OP PT  PT Discharge Recommendation: Home with outpatient rehabilitation    See flowsheet documentation for full assessment

## 2023-02-13 NOTE — ASSESSMENT & PLAN NOTE
· Reported by pt as generalized weakness   · Neurology input appreciated -  · Weakness likely multifactorial due to deconditioning, obesity, diabetic neuropathy, poor sleep, and lower ext edema  · PT/OT eval appreciated - appropriate for discharge home, plan for outpatient therapies

## 2023-02-13 NOTE — PLAN OF CARE
Problem: Potential for Falls  Goal: Patient will remain free of falls  Description: INTERVENTIONS:  - Educate patient/family on patient safety including physical limitations  - Instruct patient to call for assistance with activity   - Consult OT/PT to assist with strengthening/mobility   - Keep Call bell within reach  - Keep bed low and locked with side rails adjusted as appropriate  - Keep care items and personal belongings within reach  - Initiate and maintain comfort rounds  - Make Fall Risk Sign visible to staff  - Offer Toileting every  Hours, in advance of need  - Initiate/Maintain alarm  - Obtain necessary fall risk management equipment:   - Apply yellow socks and bracelet for high fall risk patients  - Consider moving patient to room near nurses station  Outcome: Progressing     Problem: Nutrition/Hydration-ADULT  Goal: Nutrient/Hydration intake appropriate for improving, restoring or maintaining nutritional needs  Description: Monitor and assess patient's nutrition/hydration status for malnutrition  Collaborate with interdisciplinary team and initiate plan and interventions as ordered  Monitor patient's weight and dietary intake as ordered or per policy  Utilize nutrition screening tool and intervene as necessary  Determine patient's food preferences and provide high-protein, high-caloric foods as appropriate       INTERVENTIONS:  - Monitor oral intake, urinary output, labs, and treatment plans  - Assess nutrition and hydration status and recommend course of action  - Evaluate amount of meals eaten  - Assist patient with eating if necessary   - Allow adequate time for meals  - Recommend/ encourage appropriate diets, oral nutritional supplements, and vitamin/mineral supplements  - Order, calculate, and assess calorie counts as needed  - Recommend, monitor, and adjust tube feedings and TPN/PPN based on assessed needs  - Assess need for intravenous fluids  - Provide specific nutrition/hydration education as appropriate  - Include patient/family/caregiver in decisions related to nutrition  Outcome: Progressing     Problem: MOBILITY - ADULT  Goal: Maintain or return to baseline ADL function  Description: INTERVENTIONS:  -  Assess patient's ability to carry out ADLs; assess patient's baseline for ADL function and identify physical deficits which impact ability to perform ADLs (bathing, care of mouth/teeth, toileting, grooming, dressing, etc )  - Assess/evaluate cause of self-care deficits   - Assess range of motion  - Assess patient's mobility; develop plan if impaired  - Assess patient's need for assistive devices and provide as appropriate  - Encourage maximum independence but intervene and supervise when necessary  - Involve family in performance of ADLs  - Assess for home care needs following discharge   - Consider OT consult to assist with ADL evaluation and planning for discharge  - Provide patient education as appropriate  Outcome: Progressing  Goal: Maintains/Returns to pre admission functional level  Description: INTERVENTIONS:  - Perform BMAT or MOVE assessment daily    - Set and communicate daily mobility goal to care team and patient/family/caregiver  - Collaborate with rehabilitation services on mobility goals if consulted  - Perform Range of Motion  times a day  - Reposition patient every  hours    - Dangle patient  times a day  - Stand patient  times a day  - Ambulate patient  times a day  - Out of bed to chair  times a day   - Out of bed for meals  times a day  - Out of bed for toileting  - Record patient progress and toleration of activity level   Outcome: Progressing     Problem: PAIN - ADULT  Goal: Verbalizes/displays adequate comfort level or baseline comfort level  Description: Interventions:  - Encourage patient to monitor pain and request assistance  - Assess pain using appropriate pain scale  - Administer analgesics based on type and severity of pain and evaluate response  - Implement non-pharmacological measures as appropriate and evaluate response  - Consider cultural and social influences on pain and pain management  - Notify physician/advanced practitioner if interventions unsuccessful or patient reports new pain  Outcome: Progressing     Problem: INFECTION - ADULT  Goal: Absence or prevention of progression during hospitalization  Description: INTERVENTIONS:  - Assess and monitor for signs and symptoms of infection  - Monitor lab/diagnostic results  - Monitor all insertion sites, i e  indwelling lines, tubes, and drains  - Monitor endotracheal if appropriate and nasal secretions for changes in amount and color  - Ozan appropriate cooling/warming therapies per order  - Administer medications as ordered  - Instruct and encourage patient and family to use good hand hygiene technique  - Identify and instruct in appropriate isolation precautions for identified infection/condition  Outcome: Progressing  Goal: Absence of fever/infection during neutropenic period  Description: INTERVENTIONS:  - Monitor WBC    Outcome: Progressing     Problem: SAFETY ADULT  Goal: Patient will remain free of falls  Description: INTERVENTIONS:  - Educate patient/family on patient safety including physical limitations  - Instruct patient to call for assistance with activity   - Consult OT/PT to assist with strengthening/mobility   - Keep Call bell within reach  - Keep bed low and locked with side rails adjusted as appropriate  - Keep care items and personal belongings within reach  - Initiate and maintain comfort rounds  - Make Fall Risk Sign visible to staff  - Offer Toileting every  Hours, in advance of need  - Initiate/Maintainalarm  - Obtain necessary fall risk management equipment:   - Apply yellow socks and bracelet for high fall risk patients  - Consider moving patient to room near nurses station  Outcome: Progressing  Goal: Maintain or return to baseline ADL function  Description: INTERVENTIONS:  -  Assess patient's ability to carry out ADLs; assess patient's baseline for ADL function and identify physical deficits which impact ability to perform ADLs (bathing, care of mouth/teeth, toileting, grooming, dressing, etc )  - Assess/evaluate cause of self-care deficits   - Assess range of motion  - Assess patient's mobility; develop plan if impaired  - Assess patient's need for assistive devices and provide as appropriate  - Encourage maximum independence but intervene and supervise when necessary  - Involve family in performance of ADLs  - Assess for home care needs following discharge   - Consider OT consult to assist with ADL evaluation and planning for discharge  - Provide patient education as appropriate  Outcome: Progressing  Goal: Maintains/Returns to pre admission functional level  Description: INTERVENTIONS:  - Perform BMAT or MOVE assessment daily    - Set and communicate daily mobility goal to care team and patient/family/caregiver  - Collaborate with rehabilitation services on mobility goals if consulted  - Perform Range of Motion  times a day  - Reposition patient every  hours    - Dangle patient times a day  - Stand patient  times a day  - Ambulate patient  times a day  - Out of bed to chair  times a day   - Out of bed for meals  times a day  - Out of bed for toileting  - Record patient progress and toleration of activity level   Outcome: Progressing     Problem: DISCHARGE PLANNING  Goal: Discharge to home or other facility with appropriate resources  Description: INTERVENTIONS:  - Identify barriers to discharge w/patient and caregiver  - Arrange for needed discharge resources and transportation as appropriate  - Identify discharge learning needs (meds, wound care, etc )  - Arrange for interpretive services to assist at discharge as needed  - Refer to Case Management Department for coordinating discharge planning if the patient needs post-hospital services based on physician/advanced practitioner order or complex needs related to functional status, cognitive ability, or social support system  Outcome: Progressing     Problem: Knowledge Deficit  Goal: Patient/family/caregiver demonstrates understanding of disease process, treatment plan, medications, and discharge instructions  Description: Complete learning assessment and assess knowledge base    Interventions:  - Provide teaching at level of understanding  - Provide teaching via preferred learning methods  Outcome: Progressing     Problem: GENITOURINARY - ADULT  Goal: Maintains or returns to baseline urinary function  Description: INTERVENTIONS:  - Assess urinary function  - Encourage oral fluids to ensure adequate hydration if ordered  - Administer IV fluids as ordered to ensure adequate hydration  - Administer ordered medications as needed  - Offer frequent toileting  - Follow urinary retention protocol if ordered  Outcome: Progressing  Goal: Absence of urinary retention  Description: INTERVENTIONS:  - Assess patient’s ability to void and empty bladder  - Monitor I/O  - Bladder scan as needed  - Discuss with physician/AP medications to alleviate retention as needed  - Discuss catheterization for long term situations as appropriate  Outcome: Progressing  Goal: Urinary catheter remains patent  Description: INTERVENTIONS:  - Assess patency of urinary catheter  - If patient has a chronic patel, consider changing catheter if non-functioning  - Follow guidelines for intermittent irrigation of non-functioning urinary catheter  Outcome: Progressing     Problem: Prexisting or High Potential for Compromised Skin Integrity  Goal: Skin integrity is maintained or improved  Description: INTERVENTIONS:  - Identify patients at risk for skin breakdown  - Assess and monitor skin integrity  - Assess and monitor nutrition and hydration status  - Monitor labs   - Assess for incontinence   - Turn and reposition patient  - Assist with mobility/ambulation  - Relieve pressure over bony prominences  - Avoid friction and shearing  - Provide appropriate hygiene as needed including keeping skin clean and dry  - Evaluate need for skin moisturizer/barrier cream  - Collaborate with interdisciplinary team   - Patient/family teaching  - Consider wound care consult   Outcome: Progressing

## 2023-02-13 NOTE — CASE MANAGEMENT
Case Management Discharge Planning Note    Patient name Vasyl Tiwari  Location [facility] 2 Raquel Waller* MRN [id number]  : 1960 Date 2023       Current Admission Date: 2023  Current Admission Diagnosis:Sepsis [facility])   Patient Active Problem List    Diagnosis Date Noted   • Sepsis ([facility] ) 2023   • Abnormal urinalysis 2023   • Weakness 2023   • Intertrigo 2023   • Leg numbness 2023   • Unsteadiness on feet 2022   • Chronic migraine without aura, not intractable, without status migrainosus 2022   • Obstructive sleep apnea 2022   • Diabetic neuropathy ([facility] ) 2022   • Stage 3a chronic kidney disease ([place] [address] ) 2022   • Polymyalgia rheumatica ([place] [address] ) 2021   • Gait instability 2021   • Ulnar neuropathy of right upper extremity    • Blurry vision, bilateral 2021   • Depressed mood 10/08/2020   • Essential hypertension 09/15/2020   • Diabetes mellitus ([place] [address] ) 2020   • Chronic migraine without aura without status migrainosus, not intractable 2020   • Hypersomnia 2020   • Migraine without aura and without status migrainosus, not intractable 2020   • Cerebral aneurysm without rupture 2020   • History of absence seizures 2020   • Thyroid nodule 2020   • Aneurysm ([facility] ) 2020   • Type 2 diabetes mellitus with hyperglycemia, with long-term current use of insulin ([place] [address] ) 2020   • Left cavernous carotid aneurysm 02/10/2020   • Polyneuropathy associated with underlying disease ([place] [address] ) 2020   • PMR (polymyalgia rheumatica) ([facility] ) 2020   • Thrombocytosis 2020   • Morbid obesity ([place] [address] ) 2019   • Other inflammatory and immune myopathies, not elsewhere classified 2019   • Transaminitis 2019   • Frequent headaches 2019   • Type 2 diabetes mellitus with microalbuminuria, with long-term current use of insulin ([facility] ) 2019   • Mild intermittent asthma with exacerbation 08/04/2018   • Orthostatic hypotension 06/25/2018   • Lung nodules 03/22/2018   • Exertional dyspnea 02/13/2018   • Enlarged pulmonary artery (HCC) 02/13/2018   • Aortic dilatation (HCC) 02/13/2018   • 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, with long-term current use of insulin    • Benign hypertension    • Seizures ([address] )    • Obstructive sleep apnea (adult) (pediatric) 12/18/2017   • Prolonged QT interval 12/18/2017   • Decreased pulses in feet 12/11/2017   • Diabetes mellitus type 2 with complications, uncontrolled 09/08/2015   • Microalbuminuria 09/08/2015   • Vitamin D deficiency 06/06/2014   • Visual impairment in both eyes 12/06/2012   • Anemia 03/20/2012   • Hyperlipidemia 03/20/2012   • Class 3 severe obesity with serious comorbidity and body mass index (BMI) of 50 0 to 59 9 in adult [facility]) 03/20/2012   • Neuropathy of hand, right 03/20/2012      LOS (days): 2  Geometric Mean LOS (GMLOS) (days):   Days to GMLOS:     OBJECTIVE:  Risk of Unplanned Readmission Score: 18 58         Current admission status: Inpatient   Preferred Pharmacy:   [address] [facility], [facility] [place] [address]  Phone: [phone] Fax: [phone]    [facility] ([address] [facility],   [address] [facility]  [address] [place] [address]  Phone: [phone] Fax: [phone]    Primary Care Provider: Abbey Schmidt MD    Primary Insurance: [employer] [facility]  Secondary Insurance:     DISCHARGE DETAILS:    Additional Comments: CM received TT from therapy that PT is now recommending home with HHC vs OPPT as pt is at a supervision level of care  CM spoke with pt who reports that she does not think [facility] would accommodate [facility] and would prefer OPPT  CM offered to call the shelter however pt declined   OPPT location list provided to pt and Dr Trung Christensen aware of same  Pt reporting that she does not currently have [employer], that she has [address] she believes  CM sent email to insurance verifiers to clarify  Pt reports she has a rolling walker in storage that family is working on getting for her for time of discharge

## 2023-02-13 NOTE — DISCHARGE SUMMARY
[address] [facility]  Discharge- Dimitry Prom 1960, 57 y o  female MRN: [id number]  Unit/Bed#: [place] [address] -01 Encounter: [id number]  Primary Care Provider: Mor Godwin MD   Date and time admitted to hospital: 2/11/2023 12:11 AM    * Weakness  Assessment & Plan  · Reported by pt as generalized weakness   · Neurology input appreciated -  · Weakness likely multifactorial due to deconditioning, obesity, diabetic neuropathy, poor sleep, and lower ext edema  · PT/OT eval appreciated - appropriate for discharge home, plan for outpatient therapies        Leg numbness  Assessment & Plan  B/l leg numbness which is more chronic in nature w/recent worsening of b/l leg weakness since fall approx 1 mo ago (pt describes this as 2 weeks ago but was evaluated on 1/13/23 at this facility) now w/worsening weakness in lower legs b/l by pt report x~ 2 weeks  · Per PT/OT eval appropriate for discharge home with home care - per CM based on patient's insurance and patient staying at homeless shelter will discharge to shelter with outpatient PT/OT        Intertrigo  Assessment & Plan  W/skin breakdown of right abd pannus   Received supportive care     Abnormal urinalysis  Assessment & Plan  Had a possible UTI on admission treated with ceftriaxone due to positive UA, discontinued with negative urine cultures     Sepsis ([facility] )  Assessment & Plan  · SIRS POA by tachycardia/leucocytosis   · Sepsis ruled out with no growth on urine cultures and no evidence of pneumonia  · Stable off of antibiotics      Polymyalgia rheumatica ([facility] )  Assessment & Plan  On prednisone 4mg po daily   Continue op regimen    Diabetes mellitus [facility])  Assessment & Plan  Lab Results   Component Value Date    HGBA1C 8 4 (A) 08/25/2022       Recent Labs     02/12/23  1631 02/12/23  2124 02/13/23  0749 02/13/23  1111   POCGLU 176* 146* 107 162*       Blood Sugar Average: Last 72 hrs:  (P) 167 [id number]     On metformin and human R U-500 80 units every morning and 20 to 40 units every afternoon before dinner  Continue home regimen     Morbid obesity ([address] )  Assessment & Plan  BMI 51 noted  Encourage lifestyle changes     Benign hypertension  Assessment & Plan  · Continue diltiazem and losartan, added metoprolol given tachycardia and elevated blood pressures        Medical Problems     Resolved Problems  Date Reviewed: 2/13/2023   None       Discharging Physician / Practitioner: Adilene Chester MD  PCP: Kwame Sharma MD  Admission Date:   Admission Orders (From admission, onward)     Ordered        02/11/23 0338  INPATIENT ADMISSION  Once                      Discharge Date: 02/13/23    Consultations During Hospital Stay:  · Neurology     Procedures Performed:   CT head wo contrast   Final Result by Chase Hernandez DO (02/11 0518)      No acute intracranial abnormality  Workstation performed: [id number]         CT spine lumbar wo contrast   Final Result by Chase Hernandez DO (02/11 1558)      No acute osseous abnormality  Multilevel degenerative changes, as described, with moderate to severe degenerative changes at L4-L5 and L5-S1               Workstation performed: [id number]         CT spine cervical wo contrast   Final Result by Chase Hernandez DO (02/11 0522)      No cervical spine fracture or traumatic malalignment  Workstation performed: [id number]         XR chest portable   ED Interpretation by Jovita Michel DO (02/11 0153)   Questionable right lower lung field consolidation versus atelectasis      Final Result by Pratibha Porter MD (02/11 0730)      Mild cardiomegaly  Question mild pulmonary venous congestion                    Workstation performed: [id number]         VAS lower limb venous duplex study, complete bilateral    (Results Pending)         Significant Findings / Test Results:   Results from last 7 days   Lab Units 02/13/23  0526   WBC Thousand/uL 11 02*   HEMOGLOBIN g/dL 9 5*   HEMATOCRIT % 31 7*   PLATELETS Thousands/uL 317     Results from last 7 days   Lab Units 02/13/23  0526 02/12/23  0513 02/11/23  0106   SODIUM mmol/L 144   < > 142   CHLORIDE mmol/L 111*   < > 108   CO2 mmol/L 28   < > 26   BUN mg/dL 10   < > 17   CREATININE mg/dL 0 98   < > 1 30   CALCIUM mg/dL 8 4   < > 9 1   ALK PHOS U/L  --   --  114*   ALT U/L  --   --  14   AST U/L  --   --  13    < > = values in this interval not displayed  Test Results Pending at Discharge (will require follow up): · None     Outpatient Tests Requested:  · None    Complications:  None    Reason for Admission: generalized weakness     Hospital Course:   Anayeli Trevino is a 57 y o  female patient with PMH of PMR, HTN, DM, obesity who originally presented to the hospital on 2/11/2023 due to generalized weakness  Initially met SIRS criteria with no source of infection identified, empiric antibiotics discontinued  Neurology evaluated the patient, felt weakness was multifactorial  Patient was evaluated by PT/OT prior to discharge with plan to discharge home (homeless shelter) with outpatient PT/OT  Please see above list of diagnoses and related plan for additional information  Condition at Discharge: stable    Discharge Day Visit / Exam:   Subjective:  Patient seen and examined  She voices no acute complaints and expects to be discharged home  No overnight events  Son at bedside  Vitals: Blood Pressure: 136/71 (02/13/23 1116)  Pulse: 69 (02/13/23 1116)  Temperature: 98 3 °F (36 8 °C) (02/13/23 1116)  Temp Source: Oral (02/11/23 2125)  Respirations: 14 (02/13/23 1116)  Height: 5' 8" (172 7 cm) (02/11/23 0530)  Weight - Scale: (!) 153 kg (338 lb 3 oz) (02/11/23 0530)  SpO2: 97 % (02/13/23 1116)    Exam:     Physical Exam  Constitutional:       General: She is not in acute distress  Appearance: She is obese  HENT:      Head: Normocephalic and atraumatic  Nose: No congestion     Eyes:      Conjunctiva/sclera: Conjunctivae normal    Cardiovascular:      Rate and Rhythm: Normal rate and regular rhythm  Heart sounds: No murmur heard  Pulmonary:      Effort: No respiratory distress  Breath sounds: No wheezing or rales  Abdominal:      General: Bowel sounds are normal    Musculoskeletal:      Right lower leg: Edema present  Left lower leg: Edema present  Skin:     General: Skin is warm and dry  Neurological:      Mental Status: She is oriented to person, place, and time  Psychiatric:         Mood and Affect: Mood normal           Discussion with Family: Updated  (son) at bedside  Discharge instructions/Information to patient and family:   See after visit summary for information provided to patient and family  Provisions for Follow-Up Care:  See after visit summary for information related to follow-up care and any pertinent home health orders  Disposition:   Home    Planned Readmission: No     Discharge Statement:  I spent 55 minutes discharging the patient  This time was spent on the day of discharge  I had direct contact with the patient on the day of discharge  Greater than 50% of the total time was spent examining patient, answering all patient questions, arranging and discussing plan of care with patient as well as directly providing post-discharge instructions  Additional time then spent on discharge activities  Discharge Medications:  See after visit summary for reconciled discharge medications provided to patient and/or family        **Please Note: This note may have been constructed using a voice recognition system**

## 2023-02-13 NOTE — ASSESSMENT & PLAN NOTE
Had a possible UTI on admission treated with ceftriaxone due to positive UA, discontinued with negative urine cultures

## 2023-02-13 NOTE — ASSESSMENT & PLAN NOTE
· SIRS POA by tachycardia/leucocytosis   · Sepsis ruled out with no growth on urine cultures and no evidence of pneumonia  · Stable off of antibiotics

## 2023-02-13 NOTE — TELEPHONE ENCOUNTER
Spoke with patient and she told me not fill out LOHJA form for [employer] supplies since she is currently in the hospital

## 2023-02-13 NOTE — PHYSICAL THERAPY NOTE
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]  Sepsis ([facility] ) [A41 9]    Problem List  Patient Active Problem List   Diagnosis    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, with long-term current use of insulin    Benign hypertension    Seizures ([facility] )    Exertional dyspnea    Enlarged pulmonary artery (HCC)    Aortic dilatation (HCC)    Anemia    Decreased pulses in feet    Diabetes mellitus type 2 with complications, uncontrolled    Hyperlipidemia    Microalbuminuria    Obstructive sleep apnea (adult) (pediatric)    Class 3 severe obesity with serious comorbidity and body mass index (BMI) of 50 0 to 59 9 in adult [facility])    Neuropathy of hand, right    Prolonged QT interval    Visual impairment in both eyes    Vitamin D deficiency    Lung nodules    Orthostatic hypotension    Mild intermittent asthma with exacerbation    Type 2 diabetes mellitus with microalbuminuria, with long-term current use of insulin (HCC)    Frequent headaches    Other inflammatory and immune myopathies, not elsewhere classified    Transaminitis    Morbid obesity (HCC)    Polyneuropathy associated with underlying disease ([facility] )    PMR (polymyalgia rheumatica) (HCC)    Thrombocytosis    Left cavernous carotid aneurysm    Type 2 diabetes mellitus with hyperglycemia, with long-term current use of insulin (HCC)    Aneurysm (HCC)    Thyroid nodule    History of absence seizures    Hypersomnia    Migraine without aura and without status migrainosus, not intractable    Cerebral aneurysm without rupture    Chronic migraine without aura without status migrainosus, not intractable    Diabetes mellitus ([facility] )    Essential hypertension    Depressed mood    Blurry vision, bilateral    Ulnar neuropathy of right upper extremity    Polymyalgia rheumatica (HCC)    Gait instability    Stage 3a chronic kidney disease ([facility] ) Chronic migraine without aura, not intractable, without status migrainosus    Obstructive sleep apnea    Diabetic neuropathy (HCC)    Unsteadiness on feet    Sepsis (HCC)    Abnormal urinalysis    Weakness    Intertrigo    Leg numbness       Past Medical History  Past Medical History:   Diagnosis Date    Anemia     Arthritis     Diabetes mellitus ([facility] )     Dyspnea on exertion     Hyperlipidemia     Hypertension     Legally blind 2010    Mild intermittent asthma with exacerbation 8/4/2018    Miscarriage     x 3    Polymyalgia rheumatica (HCC) 11/24/2021    Seizures ([facility])     Sickle cell trait ([facility])     Sleep apnea     Stage 3a chronic kidney disease ([facility] ) 5/19/2022    Thyroid nodule 3/6/2020       Past Surgical History  Past Surgical History:   Procedure Laterality Date    CATARACT EXTRACTION Bilateral     august and september    EYE SURGERY      HYSTERECTOMY      37    OOPHORECTOMY Left     37    NE LIGATION/BIOPSY TEMPORAL ARTERY Bilateral 10/17/2019    Procedure: BIOPSY ARTERY TEMPORAL;  Surgeon: Ricci Rasmussen DO;  [facility]: [facility];  Service: Vascular    US GUIDED THYROID BIOPSY  5/13/2020 02/13/23 1105   PT Last Visit   PT Visit Date 02/13/23   Note Type   Note type Evaluation  (+ treatment)   Pain Assessment   Pain Assessment Tool 0-10   Pain Score 10 - Worst Possible Pain  (pt reported 10/10 pain w/ flat affect)   Pain Location/Orientation Location: Pelvis   Restrictions/Precautions   Weight Bearing Precautions Per Order No   Other Precautions Fall Risk;Pain;Visual impairment  (legally blind per pt)   Home Living   Type of Home Homeless  ([facility])   Home Layout One level  (no JESSICA)   Home Equipment Walker;Cane   Prior Function   Level of [place] Independent with ADLs; Independent with functional mobility  (Radha RW in her home, SPC outside, reports having increased difficulty w/ transfers but typically independent)   Lives With Other (Comment)  (other members of homeless shelter)   [place] [address] in the last 6 months 1 to 4  (2)   Comments Pt reports that she has only been at the shelter for a few days  She is looking for an apartment  General   Family/Caregiver Present Yes  (son)   Cognition   Attention Within functional limits   Orientation Level Oriented X4   Comments overall pleasant + cooperative   Subjective   Subjective Pt agreeable to mobilize  RLE Assessment   RLE Assessment   (on MMT 4+/5)   LLE Assessment   LLE Assessment   (on MMT 4+/5)   Coordination   Movements are Fluid and Coordinated 1   Bed Mobility   Supine to Sit 5  Supervision   Additional items HOB elevated; Bedrails; Increased time required   Sit to Supine Unable to assess   Additional Comments Pt greeted in supine  Transfers   Sit to Stand 5  Supervision   Additional items Increased time required;Verbal cues   Stand to Sit 5  Supervision   Additional items Increased time required;Verbal cues   Toilet transfer 5  Supervision   Additional items Standard toilet; Increased time required  (grab bar)   Additional Comments RW   Ambulation/Elevation   Gait pattern Excessively slow; Short stride; Inconsistent ana; Antalgic;Decreased foot clearance   Gait Assistance 5  Supervision   Additional items Verbal cues  (for sequencing, to offload weight into BUE)   Assistive Device Rolling walker   Distance 70' (+70' in treatment)   Balance   Static Sitting Good   Dynamic Sitting Fair +   Static Standing Fair   Dynamic Standing Fair -   Ambulatory Fair -  (RW)   Endurance Deficit   Endurance Deficit Yes   Endurance Deficit Description weakness, fatigue   Activity Tolerance   Activity Tolerance Patient tolerated treatment well   Medical Staff Made Aware  [address]   Nurse Made Aware yes - cleared + updated   Assessment   Prognosis Fair   Problem List Decreased endurance; Impaired balance;Decreased mobility; Impaired vision;Obesity;Pain   Assessment Pt is 57 y o  female seen for a high complexity PT evaluation s/p admit to [facility] on 2/11/2023  Pt presenting w/ weakness, chills, shakes; pt w/ sepsis vs SIRS  Please see above for other active problem list / PMH  PT now consulted to assess functional mobility and needs for safe d/c planning  Prior to admission, pt was Radha w/ mobility w/ RW vs SPC, admits to increasing difficulty w/ transfers, currently staying at a homeless shelter w/o stairs to negotiate  Currently pt is S for bed skills; S for functional transfers; S for ambulation w/ RW  Pt presents w/ overall mobility deficits 2* to: generalized weakness/deconditioning; decreased endurance; impaired balance; gait deviations; obesity; fatigue  These impairments place pt at risk for falls  Pt will continue to benefit from skilled PT interventions to address stated impairments; to maximize functional potential; for ongoing pt/ family training; and DME needs  PT is currently recommending OP PT  Goals   Patient Goals go to the bathroom   STG Expiration Date 02/27/23   Short Term Goal #1 In 14 days pt will complete: 1) Bed mobility skills with I to facilitate safe return to previous living environment  2) Functional transfers with Radha to facilitate safe return to previous living environment  3) Ambulation with least restrictive ' w/ Radha without LOB for safe ambulation in home/community environment  4) Improve balance scores by 1 grade to decrease fall risk  5) Improve LE strength grades by 1 to increase independence w/ all functional mobility, transfers and gait  6) PT for ongoing pt and family education; DME needs and D/C planning to promote highest level of function in least restrictive environment  7) Stair training up/ down 12 steps with most appropriate technique and Radha to increase community access  PT Treatment Day 0   Plan   Treatment/Interventions Functional transfer training;Elevations; Therapeutic exercise; Endurance training;Patient/family training;Equipment eval/education; Bed mobility;Gait training; Compensatory technique education;Spoke to nursing;OT;Family  (balance training)   PT Frequency 2-3x/wk   Recommendation   PT Discharge Recommendation Home with outpatient rehabilitation   Equipment Recommended [address] [facility] Recommended HD Bariatric wheeled walker   Cindy Mishra 435   Turning in Flat Bed Without Bedrails 3   Lying on Back to Sitting on Edge of Flat Bed Without Bedrails 3   Moving Bed to Chair 3   Standing Up From Chair Using Arms 3   Walk in Room 3   Climb 3-5 Stairs With Railing 3   Basic Mobility Inpatient Raw Score 18   Basic Mobility Standardized Score 41 05   Highest Level Of Mobility   JH-HLM Goal 6: Walk 10 steps or more   JH-HLM Achieved 7: Walk 25 feet or more   Additional Treatment Session   Start Time 1042   End Time 1105   Treatment Assessment Gait training an additional 79' w/ RW + CS, cues for sequencing w/ RW + pacing  Gait training an additional 20'x2 to bathroom  Transfer training on to + off of toilet, as well as additional t/f training from bedside chair  Cues provided to scoot to edge of seat, for hand + feet placement, for forward trunk lean + to utilize forward momentum  End of Consult   Patient Position at End of Consult Bedside chair; All needs within reach  (son at bedside)     Flor Acevedo, PT, DPT

## 2023-02-14 ENCOUNTER — APPOINTMENT (INPATIENT)
Dept: NON INVASIVE DIAGNOSTICS | Facility: HOSPITAL | Age: 63
DRG: 074 | End: 2023-02-14
Payer: COMMERCIAL

## 2023-02-14 ENCOUNTER — HOSPITAL ENCOUNTER (EMERGENCY)
Facility: HOSPITAL | Age: 63
Discharge: HOME | End: 2023-02-15
Attending: EMERGENCY MEDICINE | Admitting: EMERGENCY MEDICINE
Payer: COMMERCIAL

## 2023-02-14 VITALS
DIASTOLIC BLOOD PRESSURE: 79 MMHG | OXYGEN SATURATION: 98 % | WEIGHT: 293 LBS | TEMPERATURE: 98.2 F | SYSTOLIC BLOOD PRESSURE: 173 MMHG | RESPIRATION RATE: 18 BRPM | HEART RATE: 78 BPM | BODY MASS INDEX: 51.39 KG/M2

## 2023-02-14 DIAGNOSIS — Z59.00 HOMELESSNESS: Primary | ICD-10-CM

## 2023-02-14 LAB
GLUCOSE SERPL-MCNC: 202 MG/DL (ref 65–140)
GLUCOSE SERPL-MCNC: 244 MG/DL (ref 65–140)

## 2023-02-14 PROCEDURE — 99239 HOSP IP/OBS DSCHRG MGMT >30: CPT | Performed by: FAMILY MEDICINE

## 2023-02-14 PROCEDURE — 93970 EXTREMITY STUDY: CPT

## 2023-02-14 PROCEDURE — 93970 EXTREMITY STUDY: CPT | Performed by: SURGERY

## 2023-02-14 PROCEDURE — 99283 EMERGENCY DEPT VISIT LOW MDM: CPT | Performed by: EMERGENCY MEDICINE

## 2023-02-14 PROCEDURE — 82948 REAGENT STRIP/BLOOD GLUCOSE: CPT

## 2023-02-14 PROCEDURE — 99283 EMERGENCY DEPT VISIT LOW MDM: CPT

## 2023-02-14 RX ADMIN — ASPIRIN 81 MG: 81 TABLET, COATED ORAL at 08:48

## 2023-02-14 RX ADMIN — METOPROLOL TARTRATE 50 MG: 50 TABLET, FILM COATED ORAL at 08:48

## 2023-02-14 RX ADMIN — GABAPENTIN 100 MG: 100 CAPSULE ORAL at 08:48

## 2023-02-14 RX ADMIN — FUROSEMIDE 20 MG: 20 TABLET ORAL at 08:48

## 2023-02-14 RX ADMIN — INSULIN LISPRO 4 UNITS: 100 INJECTION, SOLUTION INTRAVENOUS; SUBCUTANEOUS at 08:48

## 2023-02-14 RX ADMIN — PREDNISONE 4 MG: 1 TABLET ORAL at 08:48

## 2023-02-14 RX ADMIN — HEPARIN SODIUM 5000 UNITS: 5000 INJECTION INTRAVENOUS; SUBCUTANEOUS at 06:07

## 2023-02-14 RX ADMIN — INSULIN HUMAN 80 UNITS: 500 INJECTION, SOLUTION SUBCUTANEOUS at 08:49

## 2023-02-14 RX ADMIN — INSULIN LISPRO 4 UNITS: 100 INJECTION, SOLUTION INTRAVENOUS; SUBCUTANEOUS at 12:47

## 2023-02-14 RX ADMIN — LOSARTAN POTASSIUM 100 MG: 50 TABLET, FILM COATED ORAL at 08:49

## 2023-02-14 RX ADMIN — LABETALOL HYDROCHLORIDE 10 MG: 5 INJECTION, SOLUTION INTRAVENOUS at 02:58

## 2023-02-14 RX ADMIN — DILTIAZEM HYDROCHLORIDE 480 MG: 240 CAPSULE, COATED, EXTENDED RELEASE ORAL at 08:48

## 2023-02-14 RX ADMIN — BUDESONIDE AND FORMOTEROL FUMARATE DIHYDRATE 1 PUFF: 160; 4.5 AEROSOL RESPIRATORY (INHALATION) at 08:51

## 2023-02-14 SDOH — ECONOMIC STABILITY - HOUSING INSECURITY: HOMELESSNESS UNSPECIFIED: Z59.00

## 2023-02-14 NOTE — PLAN OF CARE
Problem: Potential for Falls  Goal: Patient will remain free of falls  Description: INTERVENTIONS:  - Educate patient/family on patient safety including physical limitations  - Instruct patient to call for assistance with activity   - Consult OT/PT to assist with strengthening/mobility   - Keep Call bell within reach  - Keep bed low and locked with side rails adjusted as appropriate  - Keep care items and personal belongings within reach  - Initiate and maintain comfort rounds  - Make Fall Risk Sign visible to staff  - Apply yellow socks and bracelet for high fall risk patients  - Consider moving patient to room near nurses station  Outcome: Progressing     Problem: Nutrition/Hydration-ADULT  Goal: Nutrient/Hydration intake appropriate for improving, restoring or maintaining nutritional needs  Description: Monitor and assess patient's nutrition/hydration status for malnutrition  Collaborate with interdisciplinary team and initiate plan and interventions as ordered  Monitor patient's weight and dietary intake as ordered or per policy  Utilize nutrition screening tool and intervene as necessary  Determine patient's food preferences and provide high-protein, high-caloric foods as appropriate       INTERVENTIONS:  - Monitor oral intake, urinary output, labs, and treatment plans  - Assess nutrition and hydration status and recommend course of action  - Evaluate amount of meals eaten  - Assist patient with eating if necessary   - Allow adequate time for meals  - Recommend/ encourage appropriate diets, oral nutritional supplements, and vitamin/mineral supplements  - Order, calculate, and assess calorie counts as needed  - Recommend, monitor, and adjust tube feedings and TPN/PPN based on assessed needs  - Assess need for intravenous fluids  - Provide specific nutrition/hydration education as appropriate  - Include patient/family/caregiver in decisions related to nutrition  Outcome: Progressing     Problem: MOBILITY - ADULT  Goal: Maintain or return to baseline ADL function  Description: INTERVENTIONS:  - Educate patient/family on patient safety including physical limitations  - Instruct patient to call for assistance with activity   - Consult OT/PT to assist with strengthening/mobility   - Keep Call bell within reach  - Keep bed low and locked with side rails adjusted as appropriate  - Keep care items and personal belongings within reach  - Initiate and maintain comfort rounds  - Make Fall Risk Sign visible to staff  - Apply yellow socks and bracelet for high fall risk patients  - Consider moving patient to room near nurses station  Outcome: Progressing  Goal: Maintains/Returns to pre admission functional level  Description: INTERVENTIONS:  - Perform BMAT or MOVE assessment daily    - Set and communicate daily mobility goal to care team and patient/family/caregiver  - Collaborate with rehabilitation services on mobility goals if consulted  - Perform Range of Motion *** times a day  - Reposition patient every 3 hours    - Dangle patient 3 times a day  - Stand patient 3 times a day  - Ambulate patient 3 times a day  - Out of bed to chair 3 times a day   - Out of bed for meals 3 times a day  - Out of bed for toileting  - Record patient progress and toleration of activity level   Outcome: Progressing     Problem: PAIN - ADULT  Goal: Verbalizes/displays adequate comfort level or baseline comfort level  Description: Interventions:  - Encourage patient to monitor pain and request assistance  - Assess pain using appropriate pain scale  - Administer analgesics based on type and severity of pain and evaluate response  - Implement non-pharmacological measures as appropriate and evaluate response  - Consider cultural and social influences on pain and pain management  - Notify physician/advanced practitioner if interventions unsuccessful or patient reports new pain  Outcome: Progressing     Problem: INFECTION - ADULT  Goal: Absence or prevention of progression during hospitalization  Description: INTERVENTIONS:  - Assess and monitor for signs and symptoms of infection  - Monitor lab/diagnostic results  - Monitor all insertion sites, i e  indwelling lines, tubes, and drains  - Monitor endotracheal if appropriate and nasal secretions for changes in amount and color  - [place] appropriate cooling/warming therapies per order  - Administer medications as ordered  - Instruct and encourage patient and family to use good hand hygiene technique  - Identify and instruct in appropriate isolation precautions for identified infection/condition  Outcome: Progressing  Goal: Absence of fever/infection during neutropenic period  Description: INTERVENTIONS:  - Monitor WBC    Outcome: Progressing     Problem: SAFETY ADULT  Goal: Patient will remain free of falls  Description: INTERVENTIONS:  - Educate patient/family on patient safety including physical limitations  - Instruct patient to call for assistance with activity   - Consult OT/PT to assist with strengthening/mobility   - Keep Call bell within reach  - Keep bed low and locked with side rails adjusted as appropriate  - Keep care items and personal belongings within reach  - Initiate and maintain comfort rounds  - Make Fall Risk Sign visible to staff  - Apply yellow socks and bracelet for high fall risk patients  - Consider moving patient to room near nurses station  Outcome: Progressing  Goal: Maintain or return to baseline ADL function  Description: INTERVENTIONS:  - Educate patient/family on patient safety including physical limitations  - Instruct patient to call for assistance with activity   - Consult OT/PT to assist with strengthening/mobility   - Keep Call bell within reach  - Keep bed low and locked with side rails adjusted as appropriate  - Keep care items and personal belongings within reach  - Initiate and maintain comfort rounds  - Make Fall Risk Sign visible to staff  - Apply yellow socks and bracelet for high fall risk patients  - Consider moving patient to room near nurses station  Outcome: Progressing  Goal: Maintains/Returns to pre admission functional level  Description: INTERVENTIONS:  - Perform BMAT or MOVE assessment daily    - Set and communicate daily mobility goal to care team and patient/family/caregiver  - Collaborate with rehabilitation services on mobility goals if consulted  - Perform Range of Motion 3 times a day  - Reposition patient every 3 hours  - Dangle patient 3 times a day  - Stand patient 3 times a day  - Ambulate patient 3 times a day  - Out of bed to chair 3 times a day   - Out of bed for meals 3 times a day  - Out of bed for toileting  - Record patient progress and toleration of activity level   Outcome: Progressing     Problem: DISCHARGE PLANNING  Goal: Discharge to home or other facility with appropriate resources  Description: INTERVENTIONS:  - Identify barriers to discharge w/patient and caregiver  - Arrange for needed discharge resources and transportation as appropriate  - Identify discharge learning needs (meds, wound care, etc )  - Arrange for interpretive services to assist at discharge as needed  - Refer to Case Management Department for coordinating discharge planning if the patient needs post-hospital services based on physician/advanced practitioner order or complex needs related to functional status, cognitive ability, or social support system  Outcome: Progressing     Problem: Knowledge Deficit  Goal: Patient/family/caregiver demonstrates understanding of disease process, treatment plan, medications, and discharge instructions  Description: Complete learning assessment and assess knowledge base    Interventions:  - Provide teaching at level of understanding  - Provide teaching via preferred learning methods  Outcome: Progressing     Problem: GENITOURINARY - ADULT  Goal: Maintains or returns to baseline urinary function  Description: INTERVENTIONS:  - Assess urinary function  - Encourage oral fluids to ensure adequate hydration if ordered  - Administer IV fluids as ordered to ensure adequate hydration  - Administer ordered medications as needed  - Offer frequent toileting  - Follow urinary retention protocol if ordered  Outcome: Progressing  Goal: Absence of urinary retention  Description: INTERVENTIONS:  - Assess patient's ability to void and empty bladder  - Monitor I/O  - Bladder scan as needed  - Discuss with physician/AP medications to alleviate retention as needed  - Discuss catheterization for long term situations as appropriate  Outcome: Progressing     Problem: Prexisting or High Potential for Compromised Skin Integrity  Goal: Skin integrity is maintained or improved  Description: INTERVENTIONS:  - Identify patients at risk for skin breakdown  - Assess and monitor skin integrity  - Assess and monitor nutrition and hydration status  - Monitor labs   - Assess for incontinence   - Turn and reposition patient  - Assist with mobility/ambulation  - Relieve pressure over bony prominences  - Avoid friction and shearing  - Provide appropriate hygiene as needed including keeping skin clean and dry  - Evaluate need for skin moisturizer/barrier cream  - Collaborate with interdisciplinary team   - Patient/family teaching  - Consider wound care consult   Outcome: Progressing

## 2023-02-14 NOTE — DISCHARGE SUMMARY
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]    HPI: Patient presented with generalized weakness, likely multifactorial, PMR, deconditioning  SIRS criteria on admission, infection ruled out  LE edema, DVT ruled out  Discharge delayed due to family circumstances  Procedures Performed: No orders of the defined types were placed in this encounter  Summary of Hospital Course: please see above under HPI    Significant Findings, Care, Treatment and Services Provided: please refer to chart     Complications: none    Discharge Diagnosis: generalized weakness, PMR    Medical Problems     Resolved Problems  Date Reviewed: 2/13/2023   None         Condition at Discharge: stable         Discharge instructions/Information to patient and family:   See after visit summary for information provided to patient and family  Provisions for Follow-Up Care:  See after visit summary for information related to follow-up care and any pertinent home health orders  PCP: Adelita Goldberg, MD    Disposition: See After Visit Summary for discharge disposition information  Planned Readmission: No      Discharge Statement   I spent 45 minutes discharging the patient  This time was spent on the day of discharge  I had direct contact with the patient on the day of discharge  Additional documentation is required if more than 30 minutes were spent on discharge  Discharge Medications:  See after visit summary for reconciled discharge medications provided to patient and family

## 2023-02-14 NOTE — NURSING NOTE
AVS reviewed with patient, pt verbalized understanding of instructions  Pt discharged with all belongings, faraz transported patient to requested destination at time of discharge

## 2023-02-14 NOTE — NURSING NOTE
At this time, pt remains in room 206-1  Patients son took her clothing that was bagged in her room to be washed at the [facility]  At this time, patients walker is not present for her discharge  Per patient, her son is working on getting her items for the discharge but has not yet obtained them  Dr Carl Vivar made aware that we are awaiting these items for discharge

## 2023-02-14 NOTE — PLAN OF CARE
Problem: Potential for Falls  Goal: Patient will remain free of falls  Description: INTERVENTIONS:  - Educate patient/family on patient safety including physical limitations  - Instruct patient to call for assistance with activity   - Consult OT/PT to assist with strengthening/mobility   - Keep Call bell within reach  - Keep bed low and locked with side rails adjusted as appropriate  - Keep care items and personal belongings within reach  - Initiate and maintain comfort rounds  - Make Fall Risk Sign visible to staff  - Apply yellow socks and bracelet for high fall risk patients  - Consider moving patient to room near nurses station  2/14/2023 1343 by Brie Pelaez RN  Outcome: Completed  2/14/2023 0958 by Brie Pelaez RN  Outcome: Progressing     Problem: Nutrition/Hydration-ADULT  Goal: Nutrient/Hydration intake appropriate for improving, restoring or maintaining nutritional needs  Description: Monitor and assess patient's nutrition/hydration status for malnutrition  Collaborate with interdisciplinary team and initiate plan and interventions as ordered  Monitor patient's weight and dietary intake as ordered or per policy  Utilize nutrition screening tool and intervene as necessary  Determine patient's food preferences and provide high-protein, high-caloric foods as appropriate       INTERVENTIONS:  - Monitor oral intake, urinary output, labs, and treatment plans  - Assess nutrition and hydration status and recommend course of action  - Evaluate amount of meals eaten  - Assist patient with eating if necessary   - Allow adequate time for meals  - Recommend/ encourage appropriate diets, oral nutritional supplements, and vitamin/mineral supplements  - Order, calculate, and assess calorie counts as needed  - Recommend, monitor, and adjust tube feedings and TPN/PPN based on assessed needs  - Assess need for intravenous fluids  - Provide specific nutrition/hydration education as appropriate  - Include patient/family/caregiver in decisions related to nutrition  2/14/2023 1343 by Simone Lopez RN  Outcome: Completed  2/14/2023 0958 by Simone Lopez RN  Outcome: Progressing     Problem: MOBILITY - ADULT  Goal: Maintain or return to baseline ADL function  Description: INTERVENTIONS:  - Educate patient/family on patient safety including physical limitations  - Instruct patient to call for assistance with activity   - Consult OT/PT to assist with strengthening/mobility   - Keep Call bell within reach  - Keep bed low and locked with side rails adjusted as appropriate  - Keep care items and personal belongings within reach  - Initiate and maintain comfort rounds  - Make Fall Risk Sign visible to staff  - Apply yellow socks and bracelet for high fall risk patients  - Consider moving patient to room near nurses station  2/14/2023 1343 by Simone Lopez RN  Outcome: Completed  2/14/2023 0958 by Simone Lopez RN  Outcome: Progressing  Goal: Maintains/Returns to pre admission functional level  Description: INTERVENTIONS:  - Perform BMAT or MOVE assessment daily    - Set and communicate daily mobility goal to care team and patient/family/caregiver     - Collaborate with rehabilitation services on mobility goals if consulted  - Out of bed for toileting  - Record patient progress and toleration of activity level   2/14/2023 1343 by Simone Lopez RN  Outcome: Completed  2/14/2023 0958 by Simone Lopez RN  Outcome: Progressing     Problem: PAIN - ADULT  Goal: Verbalizes/displays adequate comfort level or baseline comfort level  Description: Interventions:  - Encourage patient to monitor pain and request assistance  - Assess pain using appropriate pain scale  - Administer analgesics based on type and severity of pain and evaluate response  - Implement non-pharmacological measures as appropriate and evaluate response  - Consider cultural and social influences on pain and pain management  - Notify physician/advanced practitioner if interventions unsuccessful or patient reports new pain  2/14/2023 1343 by Kyle Zamudio RN  Outcome: Completed  2/14/2023 0958 by Kyle Zamudio RN  Outcome: Progressing     Problem: INFECTION - ADULT  Goal: Absence or prevention of progression during hospitalization  Description: INTERVENTIONS:  - Assess and monitor for signs and symptoms of infection  - Monitor lab/diagnostic results  - Monitor all insertion sites, i e  indwelling lines, tubes, and drains  - Monitor endotracheal if appropriate and nasal secretions for changes in amount and color  - [place] appropriate cooling/warming therapies per order  - Administer medications as ordered  - Instruct and encourage patient and family to use good hand hygiene technique  - Identify and instruct in appropriate isolation precautions for identified infection/condition  2/14/2023 1343 by Kyle Zamudio RN  Outcome: Completed  2/14/2023 0958 by Kyle Zamudio RN  Outcome: Progressing  Goal: Absence of fever/infection during neutropenic period  Description: INTERVENTIONS:  - Monitor WBC    2/14/2023 1343 by Kyle Zamudio RN  Outcome: Completed  2/14/2023 0958 by Kyle Zamudio RN  Outcome: Progressing     Problem: SAFETY ADULT  Goal: Patient will remain free of falls  Description: INTERVENTIONS:  - Educate patient/family on patient safety including physical limitations  - Instruct patient to call for assistance with activity   - Consult OT/PT to assist with strengthening/mobility   - Keep Call bell within reach  - Keep bed low and locked with side rails adjusted as appropriate  - Keep care items and personal belongings within reach  - Initiate and maintain comfort rounds  - Make Fall Risk Sign visible to staff  - Apply yellow socks and bracelet for high fall risk patients  - Consider moving patient to room near nurses station  2/14/2023 1343 by Kyle Zamudio RN  Outcome: Completed  2/14/2023 0958 by Kyle Zamudio RN  Outcome: Progressing  Goal: Maintain or return to baseline ADL function  Description: INTERVENTIONS:  - Educate patient/family on patient safety including physical limitations  - Instruct patient to call for assistance with activity   - Consult OT/PT to assist with strengthening/mobility   - Keep Call bell within reach  - Keep bed low and locked with side rails adjusted as appropriate  - Keep care items and personal belongings within reach  - Initiate and maintain comfort rounds  - Make Fall Risk Sign visible to staff  - Apply yellow socks and bracelet for high fall risk patients  - Consider moving patient to room near nurses station  2/14/2023 1343 by Bal Mazariegos RN  Outcome: Completed  2/14/2023 0958 by Bal Mazariegos RN  Outcome: Progressing  Goal: Maintains/Returns to pre admission functional level  Description: INTERVENTIONS:  - Perform BMAT or MOVE assessment daily    - Set and communicate daily mobility goal to care team and patient/family/caregiver     - Collaborate with rehabilitation services on mobility goals if consulted  - Out of bed for toileting  - Record patient progress and toleration of activity level   2/14/2023 1343 by Bal Mazariegos RN  Outcome: Completed  2/14/2023 0958 by Bal Mazariegos RN  Outcome: Progressing     Problem: DISCHARGE PLANNING  Goal: Discharge to home or other facility with appropriate resources  Description: INTERVENTIONS:  - Identify barriers to discharge w/patient and caregiver  - Arrange for needed discharge resources and transportation as appropriate  - Identify discharge learning needs (meds, wound care, etc )  - Arrange for interpretive services to assist at discharge as needed  - Refer to Case Management Department for coordinating discharge planning if the patient needs post-hospital services based on physician/advanced practitioner order or complex needs related to functional status, cognitive ability, or social support system  2/14/2023 1343 by Bal Mazariegos RN  Outcome: Completed  2/14/2023 0958 by Bal Mazariegos RN  Outcome: Progressing     Problem: Knowledge Deficit  Goal: Patient/family/caregiver demonstrates understanding of disease process, treatment plan, medications, and discharge instructions  Description: Complete learning assessment and assess knowledge base    Interventions:  - Provide teaching at level of understanding  - Provide teaching via preferred learning methods  2/14/2023 1343 by Deanne Maki RN  Outcome: Completed  2/14/2023 0958 by Deanne Maki RN  Outcome: Progressing     Problem: GENITOURINARY - ADULT  Goal: Maintains or returns to baseline urinary function  Description: INTERVENTIONS:  - Assess urinary function  - Encourage oral fluids to ensure adequate hydration if ordered  - Administer IV fluids as ordered to ensure adequate hydration  - Administer ordered medications as needed  - Offer frequent toileting  - Follow urinary retention protocol if ordered  2/14/2023 1343 by Deanne Maki RN  Outcome: Completed  2/14/2023 0958 by Deanne Maki RN  Outcome: Progressing  Goal: Absence of urinary retention  Description: INTERVENTIONS:  - Assess patient's ability to void and empty bladder  - Monitor I/O  - Bladder scan as needed  - Discuss with physician/AP medications to alleviate retention as needed  - Discuss catheterization for long term situations as appropriate  2/14/2023 1343 by Deanne Maki RN  Outcome: Completed  2/14/2023 0958 by Deanne Maki RN  Outcome: Progressing     Problem: Prexisting or High Potential for Compromised Skin Integrity  Goal: Skin integrity is maintained or improved  Description: INTERVENTIONS:  - Identify patients at risk for skin breakdown  - Assess and monitor skin integrity  - Assess and monitor nutrition and hydration status  - Monitor labs   - Assess for incontinence   - Turn and reposition patient  - Assist with mobility/ambulation  - Relieve pressure over bony prominences  - Avoid friction and shearing  - Provide appropriate hygiene as needed including keeping skin clean and dry  - Evaluate need for skin moisturizer/barrier cream  - Collaborate with interdisciplinary team   - Patient/family teaching  - Consider wound care consult   2/14/2023 1343 by Seven Joseph RN  Outcome: Completed  2/14/2023 0958 by Seven Joseph RN  Outcome: Progressing

## 2023-02-14 NOTE — PLAN OF CARE
Problem: Potential for Falls  Goal: Patient will remain free of falls  Description: INTERVENTIONS:  - Educate patient/family on patient safety including physical limitations  - Instruct patient to call for assistance with activity   - Consult OT/PT to assist with strengthening/mobility   - Keep Call bell within reach  - Keep bed low and locked with side rails adjusted as appropriate  - Keep care items and personal belongings within reach  - Initiate and maintain comfort rounds  - Make Fall Risk Sign visible to staff  - Apply yellow socks and bracelet for high fall risk patients  - Consider moving patient to room near nurses station  Outcome: Progressing     Problem: Nutrition/Hydration-ADULT  Goal: Nutrient/Hydration intake appropriate for improving, restoring or maintaining nutritional needs  Description: Monitor and assess patient's nutrition/hydration status for malnutrition  Collaborate with interdisciplinary team and initiate plan and interventions as ordered  Monitor patient's weight and dietary intake as ordered or per policy  Utilize nutrition screening tool and intervene as necessary  Determine patient's food preferences and provide high-protein, high-caloric foods as appropriate       INTERVENTIONS:  - Monitor oral intake, urinary output, labs, and treatment plans  - Assess nutrition and hydration status and recommend course of action  - Evaluate amount of meals eaten  - Assist patient with eating if necessary   - Allow adequate time for meals  - Recommend/ encourage appropriate diets, oral nutritional supplements, and vitamin/mineral supplements  - Order, calculate, and assess calorie counts as needed  - Recommend, monitor, and adjust tube feedings and TPN/PPN based on assessed needs  - Assess need for intravenous fluids  - Provide specific nutrition/hydration education as appropriate  - Include patient/family/caregiver in decisions related to nutrition  Outcome: Progressing     Problem: MOBILITY - ADULT  Goal: Maintain or return to baseline ADL function  Description: INTERVENTIONS:  - Educate patient/family on patient safety including physical limitations  - Instruct patient to call for assistance with activity   - Consult OT/PT to assist with strengthening/mobility   - Keep Call bell within reach  - Keep bed low and locked with side rails adjusted as appropriate  - Keep care items and personal belongings within reach  - Initiate and maintain comfort rounds  - Make Fall Risk Sign visible to staff  - Apply yellow socks and bracelet for high fall risk patients  - Consider moving patient to room near nurses station  Outcome: Progressing  Goal: Maintains/Returns to pre admission functional level  Description: INTERVENTIONS:  - Perform BMAT or MOVE assessment daily    - Set and communicate daily mobility goal to care team and patient/family/caregiver     - Collaborate with rehabilitation services on mobility goals if consulted  - Out of bed for toileting  - Record patient progress and toleration of activity level   Outcome: Progressing     Problem: PAIN - ADULT  Goal: Verbalizes/displays adequate comfort level or baseline comfort level  Description: Interventions:  - Encourage patient to monitor pain and request assistance  - Assess pain using appropriate pain scale  - Administer analgesics based on type and severity of pain and evaluate response  - Implement non-pharmacological measures as appropriate and evaluate response  - Consider cultural and social influences on pain and pain management  - Notify physician/advanced practitioner if interventions unsuccessful or patient reports new pain  Outcome: Progressing     Problem: INFECTION - ADULT  Goal: Absence or prevention of progression during hospitalization  Description: INTERVENTIONS:  - Assess and monitor for signs and symptoms of infection  - Monitor lab/diagnostic results  - Monitor all insertion sites, i e  indwelling lines, tubes, and drains  - Monitor endotracheal if appropriate and nasal secretions for changes in amount and color  - [place] appropriate cooling/warming therapies per order  - Administer medications as ordered  - Instruct and encourage patient and family to use good hand hygiene technique  - Identify and instruct in appropriate isolation precautions for identified infection/condition  Outcome: Progressing  Goal: Absence of fever/infection during neutropenic period  Description: INTERVENTIONS:  - Monitor WBC    Outcome: Progressing     Problem: SAFETY ADULT  Goal: Patient will remain free of falls  Description: INTERVENTIONS:  - Educate patient/family on patient safety including physical limitations  - Instruct patient to call for assistance with activity   - Consult OT/PT to assist with strengthening/mobility   - Keep Call bell within reach  - Keep bed low and locked with side rails adjusted as appropriate  - Keep care items and personal belongings within reach  - Initiate and maintain comfort rounds  - Make Fall Risk Sign visible to staff  - Apply yellow socks and bracelet for high fall risk patients  - Consider moving patient to room near nurses station  Outcome: Progressing  Goal: Maintain or return to baseline ADL function  Description: INTERVENTIONS:  - Educate patient/family on patient safety including physical limitations  - Instruct patient to call for assistance with activity   - Consult OT/PT to assist with strengthening/mobility   - Keep Call bell within reach  - Keep bed low and locked with side rails adjusted as appropriate  - Keep care items and personal belongings within reach  - Initiate and maintain comfort rounds  - Make Fall Risk Sign visible to staff  - Apply yellow socks and bracelet for high fall risk patients  - Consider moving patient to room near nurses station  Outcome: Progressing  Goal: Maintains/Returns to pre admission functional level  Description: INTERVENTIONS:  - Perform BMAT or MOVE assessment daily    - Set and communicate daily mobility goal to care team and patient/family/caregiver  - Collaborate with rehabilitation services on mobility goals if consulted  - Out of bed for toileting  - Record patient progress and toleration of activity level   Outcome: Progressing     Problem: DISCHARGE PLANNING  Goal: Discharge to home or other facility with appropriate resources  Description: INTERVENTIONS:  - Identify barriers to discharge w/patient and caregiver  - Arrange for needed discharge resources and transportation as appropriate  - Identify discharge learning needs (meds, wound care, etc )  - Arrange for interpretive services to assist at discharge as needed  - Refer to Case Management Department for coordinating discharge planning if the patient needs post-hospital services based on physician/advanced practitioner order or complex needs related to functional status, cognitive ability, or social support system  Outcome: Progressing     Problem: Knowledge Deficit  Goal: Patient/family/caregiver demonstrates understanding of disease process, treatment plan, medications, and discharge instructions  Description: Complete learning assessment and assess knowledge base    Interventions:  - Provide teaching at level of understanding  - Provide teaching via preferred learning methods  Outcome: Progressing     Problem: GENITOURINARY - ADULT  Goal: Maintains or returns to baseline urinary function  Description: INTERVENTIONS:  - Assess urinary function  - Encourage oral fluids to ensure adequate hydration if ordered  - Administer IV fluids as ordered to ensure adequate hydration  - Administer ordered medications as needed  - Offer frequent toileting  - Follow urinary retention protocol if ordered  Outcome: Progressing  Goal: Absence of urinary retention  Description: INTERVENTIONS:  - Assess patient's ability to void and empty bladder  - Monitor I/O  - Bladder scan as needed  - Discuss with physician/AP medications to alleviate retention as needed  - Discuss catheterization for long term situations as appropriate  Outcome: Progressing     Problem: Prexisting or High Potential for Compromised Skin Integrity  Goal: Skin integrity is maintained or improved  Description: INTERVENTIONS:  - Identify patients at risk for skin breakdown  - Assess and monitor skin integrity  - Assess and monitor nutrition and hydration status  - Monitor labs   - Assess for incontinence   - Turn and reposition patient  - Assist with mobility/ambulation  - Relieve pressure over bony prominences  - Avoid friction and shearing  - Provide appropriate hygiene as needed including keeping skin clean and dry  - Evaluate need for skin moisturizer/barrier cream  - Collaborate with interdisciplinary team   - Patient/family teaching  - Consider wound care consult   Outcome: Progressing

## 2023-02-15 VITALS
OXYGEN SATURATION: 95 % | BODY MASS INDEX: 44.41 KG/M2 | RESPIRATION RATE: 20 BRPM | SYSTOLIC BLOOD PRESSURE: 98 MMHG | WEIGHT: 293 LBS | HEIGHT: 68 IN | DIASTOLIC BLOOD PRESSURE: 71 MMHG | HEART RATE: 56 BPM | TEMPERATURE: 97.5 F

## 2023-02-15 NOTE — UTILIZATION REVIEW
NOTIFICATION OF ADMISSION DISCHARGE   This is a Notification of Discharge from [address]  Please be advised that this patient has been discharge from our facility  Below you will find the admission and discharge date and time including the patient’s disposition  UTILIZATION REVIEW CONTACT:  [place]  Utilization   Network Utilization Review Department  Phone: [phone] x carefully listen to the prompts  All voicemails are confidential   Email: [email or web address]     ADMISSION INFORMATION  PRESENTATION DATE: 2/11/2023 12:11 AM  OBERVATION ADMISSION DATE:   INPATIENT ADMISSION DATE: 2/11/23  3:38 AM   DISCHARGE DATE: 2/14/2023  1:48 PM   DISPOSITION:Home/Self Care    IMPORTANT INFORMATION:  Send all requests for admission clinical reviews, approved or denied determinations and any other requests to dedicated fax number below belonging to the campus where the patient is receiving treatment   List of dedicated fax numbers:  [address] DENIALS (Administrative/Medical Necessity) [phone]   [address] (Maternity/NICU/Pediatrics) [phone]   [facility] [phone]   [facility] 87 [phone]   [address] [phone]   220 [facility] [phone]   90 [facility] [phone]   [address] [place], [place] [address] [address] [phone]   [facility]  [phone] 4058 [facility] [phone]   412 [facility] [address] [facility] [phone]

## 2023-02-15 NOTE — ED NOTES
Patient sleeping on stretcher at this time, given a warm blanket        Lesley Ugarte, BRISA  02/14/23 5814

## 2023-02-15 NOTE — ED PROVIDER NOTES
History  Chief Complaint   Patient presents with   • Homeless     [facility] and couldn't find anywhere to go  No medical complaints at this time  64-year-old female with multiple medical issues who presents with homelessness  [facility]arming shelter was closed this evening  She has nowhere else to go  She has no medical complaints  Prior to Admission Medications   Prescriptions Last Dose Informant Patient Reported? Taking?    Blood Glucose Monitoring Suppl (OneTouch Verio) w/Device KIT   No No   Sig: Use 3 (three) times a day for 14 days   Blood Pressure Monitor KIT   No No   Sig: Check blood pressures BID   Cholecalciferol (HM Vitamin D3) 100 MCG (4000 UT) CAPS   No No   Si units daily   Continuous Blood Gluc  (FreeStyle Denisa 2 Alfred) ERIC   No No   Sig: Use 1 each 4 (four) times a day   Continuous Blood Gluc Sensor (FreeStyle Denisa 2 Sensor) MISC   No No   Sig: Use 1 each every 14 (fourteen) days   Diclofenac Sodium POWD   Yes No   Insulin Pen Needle (BD Pen Needle Tita U/F) 32G X 4 MM MISC   No No   Sig: Use 2 (two) times a day   Insulin Regular Human, Conc, (HumuLIN R U-500 KwikPen) 500 units/mL CONCENTRATED U-500 injection pen   No No   Sig: INJECT SUBCUTANEOUSLY 80   UNITS BEFORE BREAKFAST AND  20 UNITS BEFORE DINNER   Lancets (OneTouch Delica Plus VYYOHZ22N) MISC   No No   Sig: Use 1 each 3 (three) times a day   Patient not taking: Reported on 2022   Riboflavin 400 MG TABS   No No   Sig: Take 1 tablet (400 mg total) by mouth daily   albuterol (Proventil HFA) 90 mcg/act inhaler   No No   Sig: Inhale 2 puffs every 6 (six) hours as needed for wheezing for up to 14 days For another 24 hours use every 4 hours standing   aspirin (ECOTRIN LOW STRENGTH) 81 mg EC tablet   No No   Sig: Take 1 tablet (81 mg total) by mouth daily for 14 days   atorvastatin (LIPITOR) 40 mg tablet   No No   Sig: Take 1 tablet (40 mg total) by mouth daily for 14 days budesonide-formoterol (Symbicort) 160-4 5 mcg/act inhaler   No No   Sig: Inhale 1 puff 2 (two) times a day for 14 days Rinse mouth after use  diltiazem (CARDIZEM CD) 240 mg 24 hr capsule   No No   Sig: Take 2 capsules (480 mg total) by mouth daily for 14 days   ferrous sulfate 324 (65 Fe) mg   No No   Sig: Take 1 tablet (324 mg total) by mouth daily before breakfast   furosemide (LASIX) 20 mg tablet   No No   Sig: Take 1 tablet (20 mg total) by mouth daily for 14 days   gabapentin (NEURONTIN) 100 mg capsule   No No   Sig: Take 1 capsule in am and 2-3 capsules at night   losartan (COZAAR) 100 MG tablet   No No   Sig: Take 1 tablet (100 mg total) by mouth daily   magnesium oxide (MAG-OX) 400 mg tablet   Yes No   Sig: magnesium oxide 400 mg (241 3 mg magnesium) tablet   TAKE 1 TABLET BY MOUTH EVERY DAY   metFORMIN (GLUCOPHAGE-XR) 500 mg 24 hr tablet   No No   Sig: Take 2 tablets (1,000 mg total) by mouth 2 (two) times a day with meals   metoprolol tartrate (LOPRESSOR) 50 mg tablet   No No   Sig: Take 1 tablet (50 mg total) by mouth every 12 (twelve) hours   predniSONE 1 mg tablet   No No   Sig: Take 4 tablets (4 mg total) by mouth daily Do not start before February 14, 2023     topiramate (TOPAMAX) 100 mg tablet   No No   Sig: TAKE 1 TABLET BY MOUTH AT  BEDTIME      Facility-Administered Medications: None       Past Medical History:   Diagnosis Date   • Anemia    • Arthritis    • Diabetes mellitus ([facility] )    • Dyspnea on exertion    • Hyperlipidemia    • Hypertension    • Legally blind 2010   • Mild intermittent asthma with exacerbation 8/4/2018   • Miscarriage     x 3   • Polymyalgia rheumatica ([facility] ) 11/24/2021   • Seizures ([facility] )    • Sickle cell trait ([facility] )    • Sleep apnea    • Stage 3a chronic kidney disease ([facility] ) 5/19/2022   • Thyroid nodule 3/6/2020       Past Surgical History:   Procedure Laterality Date   • CATARACT EXTRACTION Bilateral     august and september   • EYE SURGERY     • HYSTERECTOMY      37   • OOPHORECTOMY Left     37   • ME LIGATION/BIOPSY TEMPORAL ARTERY Bilateral 10/17/2019    Procedure: BIOPSY ARTERY TEMPORAL;  Surgeon: Yadira Gee DO;  [facility]: [facility];  Service: Vascular   • US GUIDED THYROID BIOPSY  5/13/2020       Family History   Problem Relation Age of Onset   • Heart attack Mother    • Heart disease Mother    • Hypertension Mother    • No Known Problems Father    • Heart disease Sister    • No Known Problems Brother    • No Known Problems Son    • No Known Problems Daughter    • Heart attack Maternal Grandmother    • Heart disease Maternal Grandmother    • Diabetes Other    • Heart attack Other    • No Known Problems Sister    • No Known Problems Sister    • No Known Problems Paternal Aunt    • No Known Problems Son    • Cancer Neg Hx    • Stroke Neg Hx      I have reviewed and agree with the history as documented  E-Cigarette/Vaping   • E-Cigarette Use Never User      E-Cigarette/Vaping Substances     Social History     Tobacco Use   • Smoking status: Never   • Smokeless tobacco: Never   Vaping Use   • Vaping Use: Never used   Substance Use Topics   • Alcohol use: Never     Alcohol/week: 0 0 standard drinks   • Drug use: No       Review of Systems   Constitutional: Negative for chills and fever  HENT: Negative for rhinorrhea, sore throat and trouble swallowing  Eyes: Negative for photophobia and visual disturbance  Respiratory: Negative for cough, chest tightness and shortness of breath  Cardiovascular: Negative for chest pain, palpitations and leg swelling  Gastrointestinal: Negative for abdominal pain, blood in stool, diarrhea, nausea and vomiting  Endocrine: Negative for polyuria  Genitourinary: Negative for dysuria, flank pain, hematuria, vaginal bleeding and vaginal discharge  Musculoskeletal: Negative for back pain and neck pain  Skin: Negative for color change and rash  Allergic/Immunologic: Negative for immunocompromised state     Neurological: Negative for dizziness, weakness, light-headedness, numbness and headaches  All other systems reviewed and are negative  Physical Exam  Physical Exam  Constitutional:       Appearance: Normal appearance  She is well-developed  She is not ill-appearing or toxic-appearing  HENT:      Head: Normocephalic  Mouth/Throat:      Lips: Pink  Mouth: Mucous membranes are moist    Eyes:      General: Lids are normal       Conjunctiva/sclera: Conjunctivae normal       Pupils: Pupils are equal, round, and reactive to light  Cardiovascular:      Rate and Rhythm: Normal rate and regular rhythm  Pulmonary:      Effort: Pulmonary effort is normal  No tachypnea  Abdominal:      General: Abdomen is flat  There is no distension  Palpations: Abdomen is not rigid  Neurological:      Mental Status: She is alert  Psychiatric:         Speech: Speech normal          Behavior: Behavior normal  Behavior is cooperative  Vital Signs  ED Triage Vitals [02/14/23 2200]   Temperature Pulse Respirations Blood Pressure SpO2   98 2 °F (36 8 °C) 78 18 (!) 173/79 98 %      Temp Source Heart Rate Source Patient Position - Orthostatic VS BP Location FiO2 (%)   Oral Monitor Sitting Right arm --      Pain Score       No Pain           Vitals:    02/14/23 2200   BP: (!) 173/79   Pulse: 78   Patient Position - Orthostatic VS: Sitting         Visual Acuity      ED Medications  Medications - No data to display    Diagnostic Studies  Results Reviewed     None                 No orders to display              Procedures  Procedures         ED Course                               SBIRT 20yo+    Flowsheet Row Most Recent Value   SBIRT (23 yo +)    In order to provide better care to our patients, we are screening all of our patients for alcohol and drug use  Would it be okay to ask you these screening questions? Yes Filed at: 02/14/2023 2221   Initial Alcohol Screen: US AUDIT-C     1   How often do you have a drink containing alcohol? 0 Filed at: 02/14/2023 2221   2  How many drinks containing alcohol do you have on a typical day you are drinking? 0 Filed at: 02/14/2023 2221   3b  FEMALE Any Age, or MALE 65+: How often do you have 4 or more drinks on one occassion? 0 Filed at: 02/14/2023 2221   Audit-C Score 0 Filed at: 02/14/2023 2221   JANIE: How many times in the past year have you    Used an illegal drug or used a prescription medication for non-medical reasons? Never Filed at: 02/14/2023 2221                    Medical Decision Making  Patient has no medical complaints  No work-up needed  Homelessness: chronic illness or injury      Disposition  Final diagnoses:   Homelessness     Time reflects when diagnosis was documented in both MDM as applicable and the Disposition within this note     Time User Action Codes Description Comment    2/14/2023 10:22 PM Azkassie December Add [Z59 00] Homelessness       ED Disposition     ED Disposition   Discharge    Condition   Stable    Date/Time   Tue Feb 14, 2023 10:22 PM    [address]  discharge to home/self care  Follow-up Information     Follow up With Specialties Details Why Contact Info Additional Information    Stoney Valenzuela MD Family Medicine Schedule an appointment as soon as possible for a visit   [facility]  [address] [facility] [address] [place] [address]  [phone]       [address] [place] [address] Emergency Department Emergency Medicine Go to  If symptoms worsen [facility] [id number]  [address] [place] [address] Emergency Department, [address] [place] [address], [address]          Patient's Medications   Discharge Prescriptions    No medications on file       No discharge procedures on file      PDMP Review     None          ED Provider  Electronically Signed by           Carla Barakat MD  02/14/23 4646

## 2023-02-16 ENCOUNTER — TELEPHONE (OUTPATIENT)
Dept: FAMILY MEDICINE CLINIC | Facility: CLINIC | Age: 63
End: 2023-02-16

## 2023-02-16 LAB
BACTERIA BLD CULT: NORMAL
BACTERIA BLD CULT: NORMAL

## 2023-02-16 NOTE — TELEPHONE ENCOUNTER
Please sched [facility]  PLS CALL [facility] TO SCHEDULE JOSÉ MIGUEL (COORDINATOR) [phone] [id number]

## 2023-02-17 ENCOUNTER — HOSPITAL ENCOUNTER (EMERGENCY)
Facility: HOSPITAL | Age: 63
Discharge: HOME | End: 2023-02-17
Attending: EMERGENCY MEDICINE

## 2023-02-17 ENCOUNTER — APPOINTMENT (EMERGENCY)
Dept: RADIOLOGY | Facility: HOSPITAL | Age: 63
End: 2023-02-17

## 2023-02-17 VITALS
SYSTOLIC BLOOD PRESSURE: 136 MMHG | WEIGHT: 293 LBS | BODY MASS INDEX: 50.94 KG/M2 | HEART RATE: 102 BPM | DIASTOLIC BLOOD PRESSURE: 87 MMHG | TEMPERATURE: 98 F | RESPIRATION RATE: 18 BRPM | OXYGEN SATURATION: 100 %

## 2023-02-17 DIAGNOSIS — E11.65 TYPE 2 DIABETES MELLITUS WITH HYPERGLYCEMIA, WITH LONG-TERM CURRENT USE OF INSULIN (HCC): ICD-10-CM

## 2023-02-17 DIAGNOSIS — R80.9 TYPE 2 DIABETES MELLITUS WITH MICROALBUMINURIA, WITH LONG-TERM CURRENT USE OF INSULIN (HCC): ICD-10-CM

## 2023-02-17 DIAGNOSIS — Z76.0 ENCOUNTER FOR MEDICATION REFILL: ICD-10-CM

## 2023-02-17 DIAGNOSIS — Z79.4 TYPE 2 DIABETES MELLITUS WITH HYPERGLYCEMIA, WITH LONG-TERM CURRENT USE OF INSULIN (HCC): ICD-10-CM

## 2023-02-17 DIAGNOSIS — E78.49 OTHER HYPERLIPIDEMIA: ICD-10-CM

## 2023-02-17 DIAGNOSIS — I10 ESSENTIAL HYPERTENSION: ICD-10-CM

## 2023-02-17 DIAGNOSIS — Z59.00 HOMELESSNESS: Primary | ICD-10-CM

## 2023-02-17 DIAGNOSIS — J45.21 MILD INTERMITTENT ASTHMA WITH EXACERBATION: ICD-10-CM

## 2023-02-17 DIAGNOSIS — E55.9 VITAMIN D DEFICIENCY: ICD-10-CM

## 2023-02-17 DIAGNOSIS — M35.3 POLYMYALGIA RHEUMATICA (HCC): ICD-10-CM

## 2023-02-17 DIAGNOSIS — Z79.4 TYPE 2 DIABETES MELLITUS WITH MICROALBUMINURIA, WITH LONG-TERM CURRENT USE OF INSULIN (HCC): ICD-10-CM

## 2023-02-17 DIAGNOSIS — D50.9 MICROCYTIC ANEMIA: ICD-10-CM

## 2023-02-17 DIAGNOSIS — E11.29 TYPE 2 DIABETES MELLITUS WITH MICROALBUMINURIA, WITH LONG-TERM CURRENT USE OF INSULIN (HCC): ICD-10-CM

## 2023-02-17 DIAGNOSIS — R60.9 EDEMA, UNSPECIFIED TYPE: ICD-10-CM

## 2023-02-17 DIAGNOSIS — E11.42 DIABETIC POLYNEUROPATHY ASSOCIATED WITH TYPE 2 DIABETES MELLITUS (HCC): ICD-10-CM

## 2023-02-17 DIAGNOSIS — I16.0 HYPERTENSIVE URGENCY: ICD-10-CM

## 2023-02-17 DIAGNOSIS — E11.9 DM (DIABETES MELLITUS) (HCC): ICD-10-CM

## 2023-02-17 LAB
ALBUMIN SERPL BCP-MCNC: 3.4 G/DL (ref 3.5–5)
ALP SERPL-CCNC: 121 U/L (ref 34–104)
ALT SERPL W P-5'-P-CCNC: 15 U/L (ref 7–52)
ANION GAP SERPL CALCULATED.3IONS-SCNC: 8 MMOL/L (ref 4–13)
AST SERPL W P-5'-P-CCNC: 14 U/L (ref 13–39)
ATRIAL RATE: 99 BPM
BASE EX.OXY STD BLDV CALC-SCNC: 88.5 % (ref 60–80)
BASE EXCESS BLDV CALC-SCNC: 0.2 MMOL/L
BASOPHILS # BLD AUTO: 0.05 THOUSANDS/ÂΜL (ref 0–0.1)
BASOPHILS NFR BLD AUTO: 1 % (ref 0–1)
BILIRUB SERPL-MCNC: 0.44 MG/DL (ref 0.2–1)
BUN SERPL-MCNC: 12 MG/DL (ref 5–25)
CALCIUM ALBUM COR SERPL-MCNC: 9.7 MG/DL (ref 8.3–10.1)
CALCIUM SERPL-MCNC: 9.2 MG/DL (ref 8.4–10.2)
CARDIAC TROPONIN I PNL SERPL HS: 5 NG/L
CHLORIDE SERPL-SCNC: 103 MMOL/L (ref 96–108)
CO2 SERPL-SCNC: 26 MMOL/L (ref 21–32)
CREAT SERPL-MCNC: 0.99 MG/DL (ref 0.6–1.3)
EOSINOPHIL # BLD AUTO: 0.45 THOUSAND/ÂΜL (ref 0–0.61)
EOSINOPHIL NFR BLD AUTO: 4 % (ref 0–6)
ERYTHROCYTE [DISTWIDTH] IN BLOOD BY AUTOMATED COUNT: 17.2 % (ref 11.6–15.1)
GFR SERPL CREATININE-BSD FRML MDRD: 60 ML/MIN/1.73SQ M
GLUCOSE SERPL-MCNC: 348 MG/DL (ref 65–140)
HCO3 BLDV-SCNC: 24.9 MMOL/L (ref 24–30)
HCT VFR BLD AUTO: 35.3 % (ref 34.8–46.1)
HGB BLD-MCNC: 10.6 G/DL (ref 11.5–15.4)
IMM GRANULOCYTES # BLD AUTO: 0.05 THOUSAND/UL (ref 0–0.2)
IMM GRANULOCYTES NFR BLD AUTO: 1 % (ref 0–2)
LYMPHOCYTES # BLD AUTO: 2.51 THOUSANDS/ÂΜL (ref 0.6–4.47)
LYMPHOCYTES NFR BLD AUTO: 24 % (ref 14–44)
MAGNESIUM SERPL-MCNC: 2 MG/DL (ref 1.9–2.7)
MCH RBC QN AUTO: 23.5 PG (ref 26.8–34.3)
MCHC RBC AUTO-ENTMCNC: 30 G/DL (ref 31.4–37.4)
MCV RBC AUTO: 78 FL (ref 82–98)
MONOCYTES # BLD AUTO: 0.82 THOUSAND/ÂΜL (ref 0.17–1.22)
MONOCYTES NFR BLD AUTO: 8 % (ref 4–12)
NEUTROPHILS # BLD AUTO: 6.51 THOUSANDS/ÂΜL (ref 1.85–7.62)
NEUTS SEG NFR BLD AUTO: 62 % (ref 43–75)
NRBC BLD AUTO-RTO: 0 /100 WBCS
O2 CT BLDV-SCNC: 13.8 ML/DL
P AXIS: 79 DEGREES
PCO2 BLDV: 40.6 MM HG (ref 42–50)
PH BLDV: 7.41 [PH] (ref 7.3–7.4)
PLATELET # BLD AUTO: 366 THOUSANDS/UL (ref 149–390)
PMV BLD AUTO: 10 FL (ref 8.9–12.7)
PO2 BLDV: 59.1 MM HG (ref 35–45)
POTASSIUM SERPL-SCNC: 3.4 MMOL/L (ref 3.5–5.3)
PR INTERVAL: 172 MS
PROT SERPL-MCNC: 7 G/DL (ref 6.4–8.4)
QRS AXIS: 37 DEGREES
QRSD INTERVAL: 96 MS
QT INTERVAL: 380 MS
QTC INTERVAL: 487 MS
RBC # BLD AUTO: 4.51 MILLION/UL (ref 3.81–5.12)
SODIUM SERPL-SCNC: 137 MMOL/L (ref 135–147)
T WAVE AXIS: 80 DEGREES
TSH SERPL DL<=0.05 MIU/L-ACNC: 2.37 UIU/ML (ref 0.45–4.5)
VENTRICULAR RATE: 99 BPM
WBC # BLD AUTO: 10.39 THOUSAND/UL (ref 4.31–10.16)

## 2023-02-17 RX ORDER — LOSARTAN POTASSIUM 100 MG/1
100 TABLET ORAL DAILY
Qty: 90 TABLET | Refills: 0 | Status: ON HOLD | OUTPATIENT
Start: 2023-02-17

## 2023-02-17 RX ORDER — FLASH GLUCOSE SENSOR
1 KIT MISCELLANEOUS
Qty: 2 EACH | Refills: 0 | Status: ON HOLD | OUTPATIENT
Start: 2023-02-17

## 2023-02-17 RX ORDER — ALBUTEROL SULFATE 90 UG/1
2 AEROSOL, METERED RESPIRATORY (INHALATION) EVERY 6 HOURS PRN
Qty: 6.7 G | Refills: 0 | Status: ON HOLD | OUTPATIENT
Start: 2023-02-17 | End: 2023-03-03

## 2023-02-17 RX ORDER — INSULIN HUMAN 500 [IU]/ML
INJECTION, SOLUTION SUBCUTANEOUS
Qty: 36 ML | Refills: 0 | Status: ON HOLD | OUTPATIENT
Start: 2023-02-17

## 2023-02-17 RX ORDER — METOPROLOL TARTRATE 50 MG/1
50 TABLET, FILM COATED ORAL EVERY 12 HOURS SCHEDULED
Qty: 60 TABLET | Refills: 0 | Status: ON HOLD | OUTPATIENT
Start: 2023-02-17

## 2023-02-17 RX ORDER — ATORVASTATIN CALCIUM 40 MG/1
40 TABLET, FILM COATED ORAL DAILY
Qty: 14 TABLET | Refills: 0 | Status: ON HOLD | OUTPATIENT
Start: 2023-02-17 | End: 2023-03-03

## 2023-02-17 RX ORDER — BLOOD PRESSURE TEST KIT
KIT MISCELLANEOUS
Qty: 1 KIT | Refills: 0 | Status: ON HOLD | OUTPATIENT
Start: 2023-02-17

## 2023-02-17 RX ORDER — BUDESONIDE AND FORMOTEROL FUMARATE DIHYDRATE 160; 4.5 UG/1; UG/1
1 AEROSOL RESPIRATORY (INHALATION) 2 TIMES DAILY
Qty: 10.2 G | Refills: 0 | Status: ON HOLD | OUTPATIENT
Start: 2023-02-17 | End: 2023-03-03

## 2023-02-17 RX ORDER — ASPIRIN 81 MG/1
81 TABLET ORAL DAILY
Qty: 14 TABLET | Refills: 0 | Status: ON HOLD | OUTPATIENT
Start: 2023-02-17 | End: 2023-03-03

## 2023-02-17 RX ORDER — FUROSEMIDE 20 MG/1
20 TABLET ORAL DAILY
Qty: 14 TABLET | Refills: 0 | Status: ON HOLD | OUTPATIENT
Start: 2023-02-17 | End: 2023-03-03

## 2023-02-17 RX ORDER — METFORMIN HYDROCHLORIDE 500 MG/1
1000 TABLET, EXTENDED RELEASE ORAL 2 TIMES DAILY WITH MEALS
Qty: 360 TABLET | Refills: 0 | Status: ON HOLD | OUTPATIENT
Start: 2023-02-17

## 2023-02-17 RX ORDER — FLASH GLUCOSE SCANNING READER
1 EACH MISCELLANEOUS 4 TIMES DAILY
Qty: 1 EACH | Refills: 0 | Status: ON HOLD | OUTPATIENT
Start: 2023-02-17

## 2023-02-17 RX ORDER — LANCETS 33 GAUGE
1 EACH MISCELLANEOUS 3 TIMES DAILY
Qty: 300 EACH | Refills: 0 | Status: ON HOLD | OUTPATIENT
Start: 2023-02-17

## 2023-02-17 RX ORDER — CHOLECALCIFEROL (VITAMIN D3) 50 MCG
CAPSULE ORAL
Qty: 60 CAPSULE | Refills: 0 | Status: ON HOLD | OUTPATIENT
Start: 2023-02-17

## 2023-02-17 SDOH — ECONOMIC STABILITY - HOUSING INSECURITY: HOMELESSNESS UNSPECIFIED: Z59.00

## 2023-02-17 NOTE — ED PROVIDER NOTES
History  Chief Complaint   Patient presents with   • Homeless     Pt states was recently kicked out of home d/t increased medical problems and unable to care for self  Pt c/o generalized tiredness and chronic pain  Pt states "I have nowhere to go and need help" Pt denies SI but states "I feel like I'm unwanted" Pt states would like to speak with crisis for resources  Lorin Frances is a 57 y o   female with PMH of chronic pain, hyperlipidemia, seizure disorder, asthma, insulin dependent diabetic mellitus, and polymyalgia rheumatica who presents to the ED today with initial complaint of homelessness  Pt reports she has a lot of medical conditions and is unable to live in many shelters due to inaccessibility to things she needs such as a higher toilet  States her son has to help her up out of bed and chairs and she cannot even put on her own socks  [place] and reports she hardly got any sleep  States she has had worsening pain and generalized tiredness  States that she feels this is related to the weather  Also reports a cough and some shortness of breath which is chronic  Has not been able to take her medications due to the medications being at the apartment and she cannot return there due to being evicted  Denies any suicidal thoughts but states "I feel like I am unwanted and do not fit in"  Denies any hallucinations or HI  Is accompanied by her adult son who is also homeless  Pt was just admitted due to weakness, thought to be multifactorial  Was admitted 2/11-2/14/23  Prior to Admission Medications   Prescriptions Last Dose Informant Patient Reported? Taking?    Blood Glucose Monitoring Suppl (OneTouch Verio) w/Device KIT   No Yes   Sig: Use 3 (three) times a day for 14 days   Blood Pressure Monitor KIT   No Yes   Sig: Check blood pressures BID   Blood Pressure Monitor KIT   No Yes   Sig: Check blood pressures BID   Cholecalciferol (HM Vitamin D3) 100 MCG (4000 UT) CAPS   No Yes   Si units daily   Cholecalciferol (HM Vitamin D3) 100 MCG (4000 UT) CAPS   No Yes   Si units daily   Continuous Blood Gluc  (FreeStyle Denisa 2 [place]) ERIC   No Yes   Sig: Use 1 each 4 (four) times a day   Continuous Blood Gluc  (FreeStyle Denisa 2 [place]) ERIC   No Yes   Sig: Use 1 each 4 (four) times a day   Continuous Blood Gluc Sensor (FreeStyle Denisa 2 Sensor) MISC   No Yes   Sig: Use 1 each every 14 (fourteen) days   Continuous Blood Gluc Sensor (FreeStyle Denisa 2 Sensor) MISC   No Yes   Sig: Use 1 each every 14 (fourteen) days   Diclofenac Sodium POWD   Yes Yes   Insulin Pen Needle (BD Pen Needle Tita U/F) 32G X 4 MM MISC   No Yes   Sig: Use 2 (two) times a day   Insulin Regular Human, Conc, (HumuLIN R U-500 KwikPen) 500 units/mL CONCENTRATED U-500 injection pen   No Yes   Sig: INJECT SUBCUTANEOUSLY 80   UNITS BEFORE BREAKFAST AND  20 UNITS BEFORE DINNER   Insulin Regular Human, Conc, (HumuLIN R U-500 KwikPen) 500 units/mL CONCENTRATED U-500 injection pen   No Yes   Sig: INJECT SUBCUTANEOUSLY 80   UNITS BEFORE BREAKFAST AND  20 UNITS BEFORE DINNER   Lancets (OneTouch Delica Plus [id number]) MISC   No Yes   Sig: Use 1 each 3 (three) times a day   Lancets (OneTouch Delica Plus [id number]) MISC   No Yes   Sig: Use 1 each 3 (three) times a day   Riboflavin 400 MG TABS   No Yes   Sig: Take 1 tablet (400 mg total) by mouth daily   albuterol (Proventil HFA) 90 mcg/act inhaler   No Yes   Sig: Inhale 2 puffs every 6 (six) hours as needed for wheezing for up to 14 days For another 24 hours use every 4 hours standing   albuterol (Proventil HFA) 90 mcg/act inhaler   No Yes   Sig: Inhale 2 puffs every 6 (six) hours as needed for wheezing for up to 14 days For another 24 hours use every 4 hours standing   aspirin (ECOTRIN LOW STRENGTH) 81 mg EC tablet   No Yes   Sig: Take 1 tablet (81 mg total) by mouth daily for 14 days   aspirin (ECOTRIN LOW STRENGTH) 81 mg EC tablet   No Yes   Sig: Take 1 tablet (81 mg total) by mouth daily for 14 days   atorvastatin (LIPITOR) 40 mg tablet   No Yes   Sig: Take 1 tablet (40 mg total) by mouth daily for 14 days   atorvastatin (LIPITOR) 40 mg tablet   No Yes   Sig: Take 1 tablet (40 mg total) by mouth daily for 14 days   budesonide-formoterol (Symbicort) 160-4 5 mcg/act inhaler   No Yes   Sig: Inhale 1 puff 2 (two) times a day for 14 days Rinse mouth after use  budesonide-formoterol (Symbicort) 160-4 5 mcg/act inhaler   No Yes   Sig: Inhale 1 puff 2 (two) times a day for 14 days Rinse mouth after use     diltiazem (CARDIZEM CD) 240 mg 24 hr capsule   No Yes   Sig: Take 2 capsules (480 mg total) by mouth daily for 14 days   ferrous sulfate 324 (65 Fe) mg   No Yes   Sig: Take 1 tablet (324 mg total) by mouth daily before breakfast   furosemide (LASIX) 20 mg tablet   No Yes   Sig: Take 1 tablet (20 mg total) by mouth daily for 14 days   furosemide (LASIX) 20 mg tablet   No Yes   Sig: Take 1 tablet (20 mg total) by mouth daily for 14 days   gabapentin (NEURONTIN) 100 mg capsule   No Yes   Sig: Take 1 capsule in am and 2-3 capsules at night   losartan (COZAAR) 100 MG tablet   No Yes   Sig: Take 1 tablet (100 mg total) by mouth daily   losartan (COZAAR) 100 MG tablet   No Yes   Sig: Take 1 tablet (100 mg total) by mouth daily   magnesium oxide (MAG-OX) 400 mg tablet   Yes Yes   Sig: magnesium oxide 400 mg (241 3 mg magnesium) tablet   TAKE 1 TABLET BY MOUTH EVERY DAY   metFORMIN (GLUCOPHAGE-XR) 500 mg 24 hr tablet   No Yes   Sig: Take 2 tablets (1,000 mg total) by mouth 2 (two) times a day with meals   metFORMIN (GLUCOPHAGE-XR) 500 mg 24 hr tablet   No Yes   Sig: Take 2 tablets (1,000 mg total) by mouth 2 (two) times a day with meals   metoprolol tartrate (LOPRESSOR) 50 mg tablet   No Yes   Sig: Take 1 tablet (50 mg total) by mouth every 12 (twelve) hours   metoprolol tartrate (LOPRESSOR) 50 mg tablet   No Yes   Sig: Take 1 tablet (50 mg total) by mouth every 12 (twelve) hours   predniSONE 1 mg tablet   No Yes   Sig: Take 4 tablets (4 mg total) by mouth daily Do not start before February 14, 2023  topiramate (TOPAMAX) 100 mg tablet   No Yes   Sig: TAKE 1 TABLET BY MOUTH AT  BEDTIME      Facility-Administered Medications: None       Past Medical History:   Diagnosis Date   • Anemia    • Arthritis    • Diabetes mellitus ([facility] )    • Dyspnea on exertion    • Hyperlipidemia    • Hypertension    • Legally blind 2010   • Mild intermittent asthma with exacerbation 8/4/2018   • Miscarriage     x 3   • Polymyalgia rheumatica ([facility] ) 11/24/2021   • Seizures ([facility] )    • Sickle cell trait ([facility] )    • Sleep apnea    • Stage 3a chronic kidney disease ([facility] ) 5/19/2022   • Thyroid nodule 3/6/2020       Past Surgical History:   Procedure Laterality Date   • CATARACT EXTRACTION Bilateral     august and september   • EYE SURGERY     • HYSTERECTOMY      37   • OOPHORECTOMY Left     37   • PA LIGATION/BIOPSY TEMPORAL ARTERY Bilateral 10/17/2019    Procedure: BIOPSY ARTERY TEMPORAL;  Surgeon: Ralph Sandhu DO;  Location: [facility];  Service: Vascular   • US GUIDED THYROID BIOPSY  5/13/2020       Family History   Problem Relation Age of Onset   • Heart attack Mother    • Heart disease Mother    • Hypertension Mother    • No Known Problems Father    • Heart disease Sister    • No Known Problems Brother    • No Known Problems Son    • No Known Problems Daughter    • Heart attack Maternal Grandmother    • Heart disease Maternal Grandmother    • Diabetes Other    • Heart attack Other    • No Known Problems Sister    • No Known Problems Sister    • No Known Problems Paternal Aunt    • No Known Problems Son    • Cancer Neg Hx    • Stroke Neg Hx      I have reviewed and agree with the history as documented      E-Cigarette/Vaping   • E-Cigarette Use Never User      E-Cigarette/Vaping Substances     Social History     Tobacco Use   • Smoking status: Never   • Smokeless tobacco: Never Vaping Use   • Vaping Use: Never used   Substance Use Topics   • Alcohol use: Never     Alcohol/week: 0 0 standard drinks   • Drug use: No       Review of Systems   Constitutional: Positive for fatigue  Respiratory: Positive for shortness of breath  Cardiovascular: Negative for chest pain and palpitations  Gastrointestinal: Negative for diarrhea, nausea and vomiting  Musculoskeletal: Positive for myalgias  All other systems reviewed and are negative  Physical Exam  Physical Exam  Vitals and nursing note reviewed  Constitutional:       General: She is not in acute distress  Appearance: She is well-developed  She is obese  She is not ill-appearing  HENT:      Head: Normocephalic and atraumatic  Eyes:      Conjunctiva/sclera: Conjunctivae normal    Cardiovascular:      Rate and Rhythm: Normal rate and regular rhythm  Pulmonary:      Effort: Pulmonary effort is normal  No respiratory distress  Breath sounds: Normal breath sounds  Abdominal:      Palpations: Abdomen is soft  Tenderness: There is no abdominal tenderness  Musculoskeletal:         General: No swelling  Cervical back: Normal range of motion and neck supple  Skin:     General: Skin is warm and dry  Capillary Refill: Capillary refill takes less than 2 seconds  Neurological:      General: No focal deficit present  Mental Status: She is alert and oriented to person, place, and time     Psychiatric:         Mood and Affect: Mood normal          Behavior: Behavior normal          Vital Signs  ED Triage Vitals [02/17/23 1617]   Temperature Pulse Respirations Blood Pressure SpO2   98 °F (36 7 °C) 102 18 (!) 201/106 100 %      Temp Source Heart Rate Source Patient Position - Orthostatic VS BP Location FiO2 (%)   Oral Monitor Sitting Right arm --      Pain Score       --           Vitals:    02/17/23 1617 02/17/23 1805   BP: (!) 201/106 136/87   Pulse: 102 102   Patient Position - Orthostatic VS: Sitting Lying         Visual Acuity      ED Medications  Medications - No data to display    Diagnostic Studies  Results Reviewed     Procedure Component Value Units Date/Time    Blood gas, venous [[id number]]  (Abnormal) Collected: 02/17/23 1955    Lab Status: Final result Specimen: Blood from Arm, Right Updated: 02/17/23 2005     pH, [place] 7 406     pCO2, [place] 40 6 mm Hg      pO2, [place] 59 1 mm Hg      HCO3, [place] 24 9 mmol/L      Base Excess, [place] 0 2 mmol/L      O2 Content, [place] 13 8 ml/dL      O2 HGB, VENOUS 88 5 %     TSH [[id number]]  (Normal) Collected: 02/17/23 1901    Lab Status: Final result Specimen: Blood from Arm, Right Updated: 02/17/23 1944     TSH 3RD GENERATON 2 368 uIU/mL     Narrative:      Patients undergoing fluorescein dye angiography may retain small amounts of fluorescein in the body for 48-72 hours post procedure  Samples containing fluorescein can produce falsely depressed TSH values  If the patient had this procedure,a specimen should be resubmitted post fluorescein clearance        HS Troponin 0hr (reflex protocol) [[id number]]  (Normal) Collected: 02/17/23 1901    Lab Status: Final result Specimen: Blood from Arm, Right Updated: 02/17/23 1935     hs TnI 0hr 5 ng/L     Comprehensive metabolic panel [[id number]]  (Abnormal) Collected: 02/17/23 1901    Lab Status: Final result Specimen: Blood from Arm, Right Updated: 02/17/23 1930     Sodium 137 mmol/L      Potassium 3 4 mmol/L      Chloride 103 mmol/L      CO2 26 mmol/L      ANION GAP 8 mmol/L      BUN 12 mg/dL      Creatinine 0 99 mg/dL      Glucose 348 mg/dL      Calcium 9 2 mg/dL      Corrected Calcium 9 7 mg/dL      AST 14 U/L      ALT 15 U/L      Alkaline Phosphatase 121 U/L      Total Protein 7 0 g/dL      Albumin 3 4 g/dL      Total Bilirubin 0 44 mg/dL      eGFR 60 ml/min/1 73sq m     Narrative:      Flo guidelines for Chronic Kidney Disease (CKD):   •  Stage 1 with normal or high GFR (GFR > 90 mL/min/1 73 square meters)  •  Stage 2 Mild CKD (GFR = 60-89 mL/min/1 73 square meters)  •  Stage 3A Moderate CKD (GFR = 45-59 mL/min/1 73 square meters)  •  Stage 3B Moderate CKD (GFR = 30-44 mL/min/1 73 square meters)  •  Stage 4 Severe CKD (GFR = 15-29 mL/min/1 73 square meters)  •  Stage 5 End Stage CKD (GFR <15 mL/min/1 73 square meters)  Note: GFR calculation is accurate only with a steady state creatinine    Magnesium [[id number]]  (Normal) Collected: 02/17/23 1901    Lab Status: Final result Specimen: Blood from Arm, Right Updated: 02/17/23 1930     Magnesium 2 0 mg/dL     CBC and differential [[id number]]  (Abnormal) Collected: 02/17/23 1901    Lab Status: Final result Specimen: Blood from Arm, Right Updated: 02/17/23 1912     WBC 10 39 Thousand/uL      RBC 4 51 Million/uL      Hemoglobin 10 6 g/dL      Hematocrit 35 3 %      MCV 78 fL      MCH 23 5 pg      MCHC 30 0 g/dL      RDW 17 2 %      MPV 10 0 fL      Platelets 715 Thousands/uL      nRBC 0 /100 WBCs      Neutrophils Relative 62 %      Immat GRANS % 1 %      Lymphocytes Relative 24 %      Monocytes Relative 8 %      Eosinophils Relative 4 %      Basophils Relative 1 %      Neutrophils Absolute 6 51 Thousands/µL      Immature Grans Absolute 0 05 Thousand/uL      Lymphocytes Absolute 2 51 Thousands/µL      Monocytes Absolute 0 82 Thousand/µL      Eosinophils Absolute 0 45 Thousand/µL      Basophils Absolute 0 05 Thousands/µL                  XR chest 1 view portable   ED Interpretation by Rojas Mayberry PA-C (02/17 2017)   Very poor quality   No acute findings                 Procedures  ECG 12 Lead Documentation Only    Date/Time: 2/17/2023 7:22 PM  Performed by: Rojas Mayberry PA-C  Authorized by: Rojas Mayberry PA-C     Patient location:  ED  Previous ECG:     Previous ECG:  Compared to current    Similarity:  Changes noted  Interpretation:     Interpretation: normal    Rate:     ECG rate:  99    ECG rate assessment: normal    Rhythm:     Rhythm: sinus rhythm    Ectopy:     Ectopy: none    QRS:     QRS axis:  Normal  Conduction:     Conduction: normal    ST segments:     ST segments:  Non-specific  T waves:     T waves: normal               ED Course  ED Course as of 02/17/23 2052 Fri Feb 17, 2023 1940 Glucose, Random(!): 348  High  Pt is known diabetic  Will add on VBG   1944 hs TnI 0hr: 5  Does not need delta given onset of symptoms >3 hours ago and ECG non ischemic    2005 pH, [place](!): 7 406  Pt is not acidotic  Not in DKA                               SBIRT 22yo+    Flowsheet Row Most Recent Value   SBIRT (25 yo +)    In order to provide better care to our patients, we are screening all of our patients for alcohol and drug use  Would it be okay to ask you these screening questions? Unable to answer at this time Filed at: 02/17/2023 1802                    Medical Decision Making  Anjel Caba is a 57 y o   female with PMH of chronic pain, hyperlipidemia, seizure disorder, asthma, insulin dependent diabetic mellitus, and polymyalgia rheumatica who presents to the ED today with initial complaint of homelessness  However through further questioning patient also reported seemingly worsening shortness of breath, fatigue, cough and pain  Work-up was completed  Random glucose was 348  This is high however patient has not been taking her medication  VBG was added on at this time, patient was not acidotic  Patient not in DKA  Potassium normal  TSH and magnesium normal   WBC slightly elevated however no concern for acute infection at this time  CXR was very poor quality however no acute changes noted by myself  Trop was 5 but given symptom onset >3 hours ago and ECG non ischemic, did not need 2nd trop  At this time there are no acute reasons for pt's symptoms  Suspect it is multifactorial  Pt was given list of homeless shelters in the area and discharged in stable condition  Also sent refills to pharmacy for all of her medications   Also plan to email case management regarding pt  I have discussed the plan to discharge pt from ED  The patient was discharged in stable condition   Patient ambulated off the department   Extensive return to emergency department precautions were discussed   Follow up with appropriate providers including primary care physician was discussed   Patient and/or their  primary decision maker expressed understanding  Nona Curl remained stable during entire emergency department stay  Portions of the record may have been created with voice recognition software  Occasional wrong word or "sound a like" substitutions may have occurred due to the inherent limitations of voice recognition software  Read the chart carefully and recognize, using context, where substitutions have occurred  DM (diabetes mellitus) ([facility] ): chronic illness or injury  Encounter for medication refill: self-limited or minor problem  Essential hypertension: chronic illness or injury  Homelessness: chronic illness or injury  Hypertensive urgency: acute illness or injury  Mild intermittent asthma with exacerbation: chronic illness or injury  Other hyperlipidemia: chronic illness or injury  Polymyalgia rheumatica ([facility] ): chronic illness or injury  Type 2 diabetes mellitus with hyperglycemia, with long-term current use of insulin ([place] [address] ): chronic illness or injury  Vitamin D deficiency: chronic illness or injury  Amount and/or Complexity of Data Reviewed  External Data Reviewed: notes  Details: Previous ED visits   Labs: ordered  Decision-making details documented in ED Course  Radiology: ordered and independent interpretation performed  Risk  OTC drugs  Prescription drug management  Diagnosis or treatment significantly limited by social determinants of health            Disposition  Final diagnoses:   Homelessness   DM (diabetes mellitus) ([facility] )   Polymyalgia rheumatica ([facility] )   Essential hypertension   Mild intermittent asthma with exacerbation   Encounter for medication refill   Other hyperlipidemia   Hypertensive urgency   Vitamin D deficiency   Type 2 diabetes mellitus with hyperglycemia, with long-term current use of insulin ([facility])   Microcytic anemia   Edema, unspecified type   Diabetic polyneuropathy associated with type 2 diabetes mellitus ([facility] )   Type 2 diabetes mellitus with microalbuminuria, with long-term current use of insulin ([facility] )     Time reflects when diagnosis was documented in both MDM as applicable and the Disposition within this note     Time User Action Codes Description Comment    2/17/2023  8:40 PM Jet Roll [Z59 00] Homelessness     2/17/2023  8:40 PM Tuck[place]oe Cypher Add [E11 9] DM (diabetes mellitus) ([place] [address] )     2/17/2023  8:40 PM [place] Cypher Add [M35 3] Polymyalgia rheumatica ([place] [address] )     2/17/2023  8:41 PM César Jaimes 68  Essential hypertension     2/17/2023  8:41 PM [place] Cypher Add [R20 0] Leg numbness     2/17/2023  8:41 PM [place] Cypher Remove [R20 0] Leg numbness     2/17/2023  8:41 PM [place] Cypher Add [J45 21] Mild intermittent asthma with exacerbation     2/17/2023  8:41 PM [place] Cypher Add [Z76 0] Encounter for medication refill     2/17/2023  8:41 PM Jet Roll [E78 49] Other hyperlipidemia     2/17/2023  8:41 PM [place] Cypher Add [I16 0] Hypertensive urgency     2/17/2023  8:41 PM [place] Cypher Add [E55 9] Vitamin D deficiency     2/17/2023  8:41 PM [place] Cypher Add [E11 65,  Z79 4] Type 2 diabetes mellitus with hyperglycemia, with long-term current use of insulin ([facility] )     2/17/2023  8:42 PM T[place] Cypher Add [D50 9] Microcytic anemia     2/17/2023  8:42 PM [place] Cypher Add [R60 9] Edema, unspecified type     2/17/2023  8:42 PM [place] Cypher Add [E11 42] Diabetic polyneuropathy associated with type 2 diabetes mellitus ([facility] )     2/17/2023  8:43 PM T[place] Cypher Add [E11 29,  R80 9,  Z79 4] Type 2 diabetes mellitus with microalbuminuria, with long-term current use of insulin [facility])       ED Disposition     ED Disposition   Discharge    Condition   Stable    Date/Time   Fri Feb 17, 2023  8:40 PM    Comment   Kayy Maddox discharge to home/self care  Follow-up Information     Follow up With Specialties Details Why Teddy Guaman MD Family Medicine Schedule an appointment as soon as possible for a visit   [address]  [address] [facility]  [place]  [phone]  [phone]  [phone]            Current Discharge Medication List      CONTINUE these medications which have CHANGED    Details   albuterol (Proventil HFA) 90 mcg/act inhaler Inhale 2 puffs every 6 (six) hours as needed for wheezing for up to 14 days For another 24 hours use every 4 hours standing  Qty: 6 7 g, Refills: 0    Comments: Substitution to a formulary equivalent within the same pharmaceutical class is authorized  Associated Diagnoses: Mild intermittent asthma with exacerbation      aspirin (ECOTRIN LOW STRENGTH) 81 mg EC tablet Take 1 tablet (81 mg total) by mouth daily for 14 days  Qty: 14 tablet, Refills: 0    Associated Diagnoses: Encounter for medication refill      atorvastatin (LIPITOR) 40 mg tablet Take 1 tablet (40 mg total) by mouth daily for 14 days  Qty: 14 tablet, Refills: 0    Comments: Requesting 1 year supply  Associated Diagnoses: Other hyperlipidemia      Blood Pressure Monitor KIT Check blood pressures BID  Qty: 1 kit, Refills: 0    Associated Diagnoses: Hypertensive urgency      budesonide-formoterol (Symbicort) 160-4 5 mcg/act inhaler Inhale 1 puff 2 (two) times a day for 14 days Rinse mouth after use    Qty: 10 2 g, Refills: 0    Associated Diagnoses: Mild intermittent asthma with exacerbation      Cholecalciferol (HM Vitamin D3) 100 MCG (4000 UT) CAPS 4000 units daily  Qty: 60 capsule, Refills: 0    Associated Diagnoses: Vitamin D deficiency      Continuous Blood Gluc  (FreeStyle Denisa 2 [place]) ERIC Use 1 each 4 (four) times a day  Qty: 1 each, Refills: 0    Associated Diagnoses: Type 2 diabetes mellitus with hyperglycemia, with long-term current use of insulin ([facility])      Continuous Blood Gluc Sensor (FreeStyle Denisa 2 Sensor) MISC Use 1 each every 14 (fourteen) days  Qty: 2 each, Refills: 0    Associated Diagnoses: Type 2 diabetes mellitus with hyperglycemia, with long-term current use of insulin ([facility])      furosemide (LASIX) 20 mg tablet Take 1 tablet (20 mg total) by mouth daily for 14 days  Qty: 14 tablet, Refills: 0    Associated Diagnoses: Edema, unspecified type      Insulin Regular Human, Conc, (HumuLIN R U-500 KwikPen) 500 units/mL CONCENTRATED U-500 injection pen INJECT SUBCUTANEOUSLY 80   UNITS BEFORE BREAKFAST AND  20 UNITS BEFORE DINNER  Qty: 36 mL, Refills: 0    Comments: Requesting 1 year supply  Associated Diagnoses: Type 2 diabetes mellitus with hyperglycemia, with long-term current use of insulin ([facility])      Lancets (OneTouch Delica Plus [id number]) MISC Use 1 each 3 (three) times a day  Qty: 300 each, Refills: 0    Associated Diagnoses: Type 2 diabetes mellitus with microalbuminuria, with long-term current use of insulin ([facility])      losartan (COZAAR) 100 MG tablet Take 1 tablet (100 mg total) by mouth daily  Qty: 90 tablet, Refills: 0    Comments: Requesting 1 year supply  Associated Diagnoses: Essential hypertension      metFORMIN (GLUCOPHAGE-XR) 500 mg 24 hr tablet Take 2 tablets (1,000 mg total) by mouth 2 (two) times a day with meals  Qty: 360 tablet, Refills: 0    Comments: Requesting 1 year supply  Associated Diagnoses: DM (diabetes mellitus) ([facility])      metoprolol tartrate (LOPRESSOR) 50 mg tablet Take 1 tablet (50 mg total) by mouth every 12 (twelve) hours  Qty: 60 tablet, Refills: 0    Associated Diagnoses: Essential hypertension         CONTINUE these medications which have NOT CHANGED    Details   Blood Glucose Monitoring Suppl (OneTouch Verio) w/Device KIT Use 3 (three) times a day for 14 days  Qty: 1 kit, Refills: 0    Associated Diagnoses: Type 2 diabetes mellitus with microalbuminuria, with long-term current use of insulin ([facility])      diltiazem (CARDIZEM CD) 240 mg 24 hr capsule Take 2 capsules (480 mg total) by mouth daily for 14 days  Qty: 180 capsule, Refills: 0    Associated Diagnoses: Essential hypertension         STOP taking these medications       Diclofenac Sodium POWD Comments:   Reason for Stopping:         ferrous sulfate 324 (65 Fe) mg Comments:   Reason for Stopping:         gabapentin (NEURONTIN) 100 mg capsule Comments:   Reason for Stopping:         Insulin Pen Needle (BD Pen Needle Tita U/F) 32G X 4 MM MISC Comments:   Reason for Stopping:         magnesium oxide (MAG-OX) 400 mg tablet Comments:   Reason for Stopping:         predniSONE 1 mg tablet Comments:   Reason for Stopping:         Riboflavin 400 MG TABS Comments:   Reason for Stopping:         topiramate (TOPAMAX) 100 mg tablet Comments:   Reason for Stopping:               No discharge procedures on file      PDMP Review     None          ED Provider  Electronically Signed by           Satinder Perez PA-C  02/17/23 8452

## 2023-02-17 NOTE — ED NOTES
Spoke to patient as she requested to see a CIS during triage  Patient is a soft spoken 61y/o F, who is calm and cooperative, and agrees to speak with this writer  Patient's son is at bedside  Patient reports she is currently homeless because she was no longer able to properly care for herself in her apartment  It is unclear as to whether she was removed from the apartment or just did not return to the apartment  Patient reports she has not gone back to the apartment where she has her medications and other items due to the stairs  Patient reports a general decline in ability to appropriately function, and is unsure where to turn for assistance  Patient denies suicidal ideation, but reports that she has had increased depression and occasional thoughts that things would be better if she wasn't here  Patient denies plans or intent to harm self  Patient denies homicidal ideation and psychosis  Patient reports an increase in medical issues and mobility  Patient [place] a roof over her head  Patient in need of social supports, and would benefit from being seen by Case Management      Ness Bland  Crisis Intervention Specialist II  02/17/23

## 2023-02-18 ENCOUNTER — HOSPITAL ENCOUNTER (EMERGENCY)
Facility: HOSPITAL | Age: 63
Discharge: HOME | End: 2023-02-18
Attending: EMERGENCY MEDICINE

## 2023-02-18 VITALS
DIASTOLIC BLOOD PRESSURE: 88 MMHG | HEART RATE: 85 BPM | OXYGEN SATURATION: 96 % | SYSTOLIC BLOOD PRESSURE: 202 MMHG | RESPIRATION RATE: 18 BRPM | TEMPERATURE: 97.4 F

## 2023-02-18 DIAGNOSIS — Z59.00 HOMELESSNESS: Primary | ICD-10-CM

## 2023-02-18 LAB — GLUCOSE SERPL-MCNC: 403 MG/DL (ref 65–140)

## 2023-02-18 RX ORDER — METOPROLOL TARTRATE 50 MG/1
50 TABLET, FILM COATED ORAL ONCE
Status: COMPLETED | OUTPATIENT
Start: 2023-02-18 | End: 2023-02-18

## 2023-02-18 RX ORDER — FUROSEMIDE 20 MG/1
20 TABLET ORAL ONCE
Status: COMPLETED | OUTPATIENT
Start: 2023-02-18 | End: 2023-02-18

## 2023-02-18 RX ADMIN — METOPROLOL TARTRATE 50 MG: 50 TABLET, FILM COATED ORAL at 01:42

## 2023-02-18 RX ADMIN — FUROSEMIDE 20 MG: 20 TABLET ORAL at 01:41

## 2023-02-18 RX ADMIN — INSULIN HUMAN 20 UNITS: 500 INJECTION, SOLUTION SUBCUTANEOUS at 01:42

## 2023-02-18 SDOH — ECONOMIC STABILITY - HOUSING INSECURITY: HOMELESSNESS UNSPECIFIED: Z59.00

## 2023-02-18 NOTE — ED PROVIDER NOTES
History  Chief Complaint   Patient presents with   • Leg Swelling     Patient c/o b/l leg pain and swelling which she reports has been ongoing for awhile  Mika Quijano is a 57 y o   female with PMH of pretension, hyperlipidemia, seizure disorder, asthma, insulin dependent diabetic mellitus, and polymyalgia rheumatica who presents to the emergency department after being unable to get into the homeless shelter  Patient is homeless and has had frequent ER visits, 1 just a few hours prior to arrival   Once unable to get into the shelter this evening so came into the ER because she was cold  She has no acute complaints on arrival   She is hypertensive and mildly tachycardic but afebrile  Patient notes that she has not had her medications yet  She is requesting these medications  She declines any other problems  History provided by:  Patient   used: No        Prior to Admission Medications   Prescriptions Last Dose Informant Patient Reported? Taking?    Blood Glucose Monitoring Suppl (OneTouch Verio) w/Device KIT   No No   Sig: Use 3 (three) times a day for 14 days   Blood Pressure Monitor KIT   No No   Sig: Check blood pressures BID   Cholecalciferol (HM Vitamin D3) 100 MCG (4000 UT) CAPS   No No   Si units daily   Continuous Blood Gluc  (FreeStyle Denisa 2 [place]) ERIC   No No   Sig: Use 1 each 4 (four) times a day   Continuous Blood Gluc Sensor (FreeStyle Denisa 2 Sensor) [facility]   No No   Sig: Use 1 each every 14 (fourteen) days   Insulin Regular Human, Conc, (HumuLIN R U-500 KwikPen) 500 units/mL CONCENTRATED U-500 injection pen   No No   Sig: INJECT SUBCUTANEOUSLY 80   UNITS BEFORE BREAKFAST AND  20 UNITS BEFORE DINNER   Lancets (OneTouch Delica Plus [id number]) MISC   No No   Sig: Use 1 each 3 (three) times a day   albuterol (Proventil HFA) 90 mcg/act inhaler   No No   Sig: Inhale 2 puffs every 6 (six) hours as needed for wheezing for up to 14 days For another 24 hours use every 4 hours standing   aspirin (ECOTRIN LOW STRENGTH) 81 mg EC tablet   No No   Sig: Take 1 tablet (81 mg total) by mouth daily for 14 days   atorvastatin (LIPITOR) 40 mg tablet   No No   Sig: Take 1 tablet (40 mg total) by mouth daily for 14 days   budesonide-formoterol (Symbicort) 160-4 5 mcg/act inhaler   No No   Sig: Inhale 1 puff 2 (two) times a day for 14 days Rinse mouth after use     diltiazem (CARDIZEM CD) 240 mg 24 hr capsule   No No   Sig: Take 2 capsules (480 mg total) by mouth daily for 14 days   furosemide (LASIX) 20 mg tablet   No No   Sig: Take 1 tablet (20 mg total) by mouth daily for 14 days   losartan (COZAAR) 100 MG tablet   No No   Sig: Take 1 tablet (100 mg total) by mouth daily   metFORMIN (GLUCOPHAGE-XR) 500 mg 24 hr tablet   No No   Sig: Take 2 tablets (1,000 mg total) by mouth 2 (two) times a day with meals   metoprolol tartrate (LOPRESSOR) 50 mg tablet   No No   Sig: Take 1 tablet (50 mg total) by mouth every 12 (twelve) hours      Facility-Administered Medications: None       Past Medical History:   Diagnosis Date   • Anemia    • Arthritis    • Diabetes mellitus (HCC)    • Dyspnea on exertion    • Hyperlipidemia    • Hypertension    • Legally blind 2010   • Mild intermittent asthma with exacerbation 8/4/2018   • Miscarriage     x 3   • Polymyalgia rheumatica (HCC) 11/24/2021   • Seizures ([facility] [address] )    • Sickle cell trait (HCC)    • Sleep apnea    • Stage 3a chronic kidney disease ([facility] [address] ) 5/19/2022   • Thyroid nodule 3/6/2020       Past Surgical History:   Procedure Laterality Date   • CATARACT EXTRACTION Bilateral     august and september   • EYE SURGERY     • HYSTERECTOMY      37   • OOPHORECTOMY Left     37   • MD LIGATION/BIOPSY TEMPORAL ARTERY Bilateral 10/17/2019    Procedure: BIOPSY ARTERY TEMPORAL;  Surgeon: Patt Guerrero DO;  Location: [facility];  Service: Vascular   • US GUIDED THYROID BIOPSY  5/13/2020       Family History   Problem Relation Age of Onset   • Heart attack Mother    • Heart disease Mother    • Hypertension Mother    • No Known Problems Father    • Heart disease Sister    • No Known Problems Brother    • No Known Problems Son    • No Known Problems Daughter    • Heart attack Maternal Grandmother    • Heart disease Maternal Grandmother    • Diabetes Other    • Heart attack Other    • No Known Problems Sister    • No Known Problems Sister    • No Known Problems Paternal Aunt    • No Known Problems Son    • Cancer Neg Hx    • Stroke Neg Hx      I have reviewed and agree with the history as documented  E-Cigarette/Vaping   • E-Cigarette Use Never User      E-Cigarette/Vaping Substances     Social History     Tobacco Use   • Smoking status: Never   • Smokeless tobacco: Never   Vaping Use   • Vaping Use: Never used   Substance Use Topics   • Alcohol use: Never     Alcohol/week: 0 0 standard drinks   • Drug use: No       Review of Systems   Constitutional: Negative for chills and fever  HENT: Negative for ear pain and sore throat  Eyes: Negative for pain and visual disturbance  Respiratory: Negative for cough and shortness of breath  Cardiovascular: Negative for chest pain and palpitations  Gastrointestinal: Negative for abdominal pain and vomiting  Genitourinary: Negative for dysuria and hematuria  Musculoskeletal: Negative for arthralgias and back pain  Skin: Negative for color change and rash  Neurological: Negative for seizures and syncope  All other systems reviewed and are negative  Physical Exam  Physical Exam  Constitutional:       Appearance: Normal appearance  She is well-developed  She is not ill-appearing or toxic-appearing  HENT:      Head: Normocephalic  Mouth/Throat:      Lips: Pink  Mouth: Mucous membranes are moist    Eyes:      General: Lids are normal       Conjunctiva/sclera: Conjunctivae normal       Pupils: Pupils are equal, round, and reactive to light     Cardiovascular:      Rate and Rhythm: Normal rate and regular rhythm  Pulmonary:      Effort: Pulmonary effort is normal  No tachypnea  Abdominal:      General: Abdomen is flat  There is no distension  Palpations: Abdomen is not rigid  Musculoskeletal:      Comments: Mild to minimal lower extremity edema, chronic  2+ DP/PT pulses  No erythema or induration  No calf tenderness  Neurological:      Mental Status: She is alert  Comments: GCS 15  CN 2-12 intact  PERRLA  Bilateral upper and lower extremities have 5/5 strength and sensation is intact  Finger to Nose and Heel to shin are intact  Speech normal      Psychiatric:         Speech: Speech normal          Behavior: Behavior normal  Behavior is cooperative           Vital Signs  ED Triage Vitals   Temperature Pulse Respirations Blood Pressure SpO2   02/18/23 0115 02/18/23 0113 02/18/23 0113 02/18/23 0113 02/18/23 0113   (!) 97 4 °F (36 3 °C) (!) 107 18 (!) 203/93 99 %      Temp Source Heart Rate Source Patient Position - Orthostatic VS BP Location FiO2 (%)   02/18/23 0115 02/18/23 0113 02/18/23 0113 02/18/23 0113 --   Oral Monitor Lying Right arm       Pain Score       02/18/23 0113       8           Vitals:    02/18/23 0113 02/18/23 0130 02/18/23 0203 02/18/23 0215   BP: (!) 203/93 (!) 213/94 (!) 202/88 (!) 202/88   Pulse: (!) 107 100 85 85   Patient Position - Orthostatic VS: Lying Lying  Lying         Visual Acuity      ED Medications  Medications   insulin regular (HumuLIN R U-500) 500 units/mL CONCENTRATED injection 20 Units (20 Units Subcutaneous Given 2/18/23 0142)   metoprolol tartrate (LOPRESSOR) tablet 50 mg (50 mg Oral Given 2/18/23 0142)   furosemide (LASIX) tablet 20 mg (20 mg Oral Given 2/18/23 0141)       Diagnostic Studies  Results Reviewed     Procedure Component Value Units Date/Time    Fingerstick Glucose (POCT) [[id number]]  (Abnormal) Collected: 02/18/23 0138    Lab Status: Final result Updated: 02/18/23 0139     POC Glucose 403 mg/dl                  No orders to display Procedures  Procedures           ED Course                               SBIRT 20yo+    Flowsheet Row Most Recent Value   SBIRT (23 yo +)    In order to provide better care to our patients, we are screening all of our patients for alcohol and drug use  Would it be okay to ask you these screening questions? No Filed at: 02/18/2023 0116                    Medical Decision Making  Patient was seen and examined  in the emergency department for chief complaint of homelessness  The patient presented after being unable to get into the homeless shelter  Patient is requesting her evening medications  No other acute medical complaints  Chronically ill-appearing but no acute distress  Lungs are clear, breathing is unlabored  Abdomen soft nontender distended  Minimal lower extremity edema with no calf tenderness and 2+ DP/PT pulses  Neuro exam is nonfocal       Initial differential includes but is not limited to: [facility], medication refill, case management  -Reviewed labs from earlier in the day as well, patient was not in DKA just a few hours prior  No other acute abnormalities  Disposition: Impression 47-year-old female who is homeless  Comes in because she did not get into the homeless shelter  Will discharge patient did request her evening meds, will give the meds that she has been compliant with  The patient was discharged in stable condition  Patient ambulated off the department  Extensive return to emergency department precautions were discussed  Follow up with appropriate providers including primary care physician was discussed  Patient and/or their  primary decision maker expressed understanding  Patient remained stable during entire emergency department stay  Homelessness: complicated acute illness or injury  Amount and/or Complexity of Data Reviewed  Labs: ordered  Risk  Prescription drug management            Disposition  Final diagnoses:   Homelessness     Time reflects when diagnosis was documented in both MDM as applicable and the Disposition within this note     Time User Action Codes Description Comment    2/18/2023  1:32 AM Anthony Fee Add [Z59 00] Homelessness       ED Disposition     ED Disposition   Discharge    Condition   Stable    Date/Time   Sat Feb 18, 2023  2:00 AM    [address]  discharge to home/self care                 Follow-up Information    None         Discharge Medication List as of 2/18/2023  2:00 AM      CONTINUE these medications which have NOT CHANGED    Details   albuterol (Proventil HFA) 90 mcg/act inhaler Inhale 2 puffs every 6 (six) hours as needed for wheezing for up to 14 days For another 24 hours use every 4 hours standing, Starting Fri 2/17/2023, Until Fri 3/3/2023 at 2359, Normal      aspirin (ECOTRIN LOW STRENGTH) 81 mg EC tablet Take 1 tablet (81 mg total) by mouth daily for 14 days, Starting Fri 2/17/2023, Until Fri 3/3/2023, Normal      atorvastatin (LIPITOR) 40 mg tablet Take 1 tablet (40 mg total) by mouth daily for 14 days, Starting Fri 2/17/2023, Until Fri 3/3/2023, Normal      Blood Glucose Monitoring Suppl (OneTouch Verio) w/Device KIT Use 3 (three) times a day for 14 days, Starting Thu 2/9/2023, Until Thu 2/23/2023, Normal      Blood Pressure Monitor KIT Check blood pressures BID, Normal      budesonide-formoterol (Symbicort) 160-4 5 mcg/act inhaler Inhale 1 puff 2 (two) times a day for 14 days Rinse mouth after use , Starting Fri 2/17/2023, Until Fri 3/3/2023, Normal      Cholecalciferol (HM Vitamin D3) 100 MCG (4000 UT) CAPS 4000 units daily, Normal      Continuous Blood Gluc  (FreeStyle [place] 2 [place]) ERIC Use 1 each 4 (four) times a day, Starting Fri 2/17/2023, Normal      Continuous Blood Gluc Sensor (FreeStyle Denisa 2 Sensor) MISC Use 1 each every 14 (fourteen) days, Starting Fri 2/17/2023, Normal      diltiazem (CARDIZEM CD) 240 mg 24 hr capsule Take 2 capsules (480 mg total) by mouth daily for 14 days, Starting Thu 2/9/2023, Until Thu 2/23/2023, Normal      furosemide (LASIX) 20 mg tablet Take 1 tablet (20 mg total) by mouth daily for 14 days, Starting Fri 2/17/2023, Until Fri 3/3/2023, Normal      Insulin Regular Human, Conc, (HumuLIN R U-500 KwikPen) 500 units/mL CONCENTRATED U-500 injection pen INJECT SUBCUTANEOUSLY 80   UNITS BEFORE BREAKFAST AND  20 UNITS BEFORE DINNER, Normal      Lancets (OneTouch Delica Plus [id number]) MISC Use 1 each 3 (three) times a day, Starting Fri 2/17/2023, Normal      losartan (COZAAR) 100 MG tablet Take 1 tablet (100 mg total) by mouth daily, Starting Fri 2/17/2023, Normal      metFORMIN (GLUCOPHAGE-XR) 500 mg 24 hr tablet Take 2 tablets (1,000 mg total) by mouth 2 (two) times a day with meals, Starting Fri 2/17/2023, Normal      metoprolol tartrate (LOPRESSOR) 50 mg tablet Take 1 tablet (50 mg total) by mouth every 12 (twelve) hours, Starting Fri 2/17/2023, Normal             No discharge procedures on file      PDMP Review     None          ED Provider  Electronically Signed by           Geri Mcclelland MD  02/14/23 3 [facility], PA-C  02/18/23 0791

## 2023-02-23 ENCOUNTER — TELEPHONE (OUTPATIENT)
Dept: ENDOCRINOLOGY | Facility: CLINIC | Age: 63
End: 2023-02-23

## 2023-02-28 ENCOUNTER — HOSPITAL ENCOUNTER (INPATIENT)
Facility: HOSPITAL | Age: 63
LOS: 7 days | Discharge: NURSING, REHAB OR LONG-TERM CARE | End: 2023-03-08
Attending: EMERGENCY MEDICINE | Admitting: FAMILY MEDICINE

## 2023-02-28 DIAGNOSIS — M35.3 POLYMYALGIA RHEUMATICA (HCC): ICD-10-CM

## 2023-02-28 DIAGNOSIS — Z79.4 TYPE 2 DIABETES MELLITUS WITH OTHER SPECIFIED COMPLICATION, WITH LONG-TERM CURRENT USE OF INSULIN (HCC): ICD-10-CM

## 2023-02-28 DIAGNOSIS — R53.1 GENERALIZED WEAKNESS: Primary | ICD-10-CM

## 2023-02-28 DIAGNOSIS — E11.9 INSULIN DEPENDENT TYPE 2 DIABETES MELLITUS (HCC): ICD-10-CM

## 2023-02-28 DIAGNOSIS — A09 DIARRHEA OF INFECTIOUS ORIGIN: ICD-10-CM

## 2023-02-28 DIAGNOSIS — E11.69 TYPE 2 DIABETES MELLITUS WITH OTHER SPECIFIED COMPLICATION, WITH LONG-TERM CURRENT USE OF INSULIN (HCC): ICD-10-CM

## 2023-02-28 DIAGNOSIS — R26.2 AMBULATORY DYSFUNCTION: ICD-10-CM

## 2023-02-28 DIAGNOSIS — J45.21 MILD INTERMITTENT ASTHMA WITH EXACERBATION: ICD-10-CM

## 2023-02-28 DIAGNOSIS — Z59.00 HOMELESSNESS: ICD-10-CM

## 2023-02-28 DIAGNOSIS — Z79.4 INSULIN DEPENDENT TYPE 2 DIABETES MELLITUS (HCC): ICD-10-CM

## 2023-02-28 LAB
ALBUMIN SERPL BCP-MCNC: 2.6 G/DL (ref 3.5–5)
ALP SERPL-CCNC: 165 U/L (ref 46–116)
ALT SERPL W P-5'-P-CCNC: 16 U/L (ref 12–78)
ANION GAP SERPL CALCULATED.3IONS-SCNC: 5 MMOL/L (ref 4–13)
AST SERPL W P-5'-P-CCNC: 18 U/L (ref 5–45)
ATRIAL RATE: 121 BPM
BACTERIA UR QL AUTO: ABNORMAL /HPF
BASE EX.OXY STD BLDV CALC-SCNC: 66.4 % (ref 60–80)
BASE EXCESS BLDV CALC-SCNC: 4.9 MMOL/L
BASOPHILS # BLD AUTO: 0.07 THOUSANDS/ÂΜL (ref 0–0.1)
BASOPHILS NFR BLD AUTO: 1 % (ref 0–1)
BETA-HYDROXYBUTYRATE: 1.4 MMOL/L
BILIRUB SERPL-MCNC: 0.59 MG/DL (ref 0.2–1)
BILIRUB UR QL STRIP: NEGATIVE
BUDDING YEAST: PRESENT
BUN SERPL-MCNC: 10 MG/DL (ref 5–25)
CALCIUM ALBUM COR SERPL-MCNC: 10.6 MG/DL (ref 8.3–10.1)
CALCIUM SERPL-MCNC: 9.5 MG/DL (ref 8.3–10.1)
CHLORIDE SERPL-SCNC: 99 MMOL/L (ref 96–108)
CLARITY UR: CLEAR
CO2 SERPL-SCNC: 29 MMOL/L (ref 21–32)
COLOR UR: YELLOW
CREAT SERPL-MCNC: 1.16 MG/DL (ref 0.6–1.3)
EOSINOPHIL # BLD AUTO: 0.25 THOUSAND/ÂΜL (ref 0–0.61)
EOSINOPHIL NFR BLD AUTO: 3 % (ref 0–6)
ERYTHROCYTE [DISTWIDTH] IN BLOOD BY AUTOMATED COUNT: 16.8 % (ref 11.6–15.1)
FERRITIN SERPL-MCNC: 50 NG/ML (ref 8–388)
FLUAV RNA RESP QL NAA+PROBE: NEGATIVE
FLUBV RNA RESP QL NAA+PROBE: NEGATIVE
GFR SERPL CREATININE-BSD FRML MDRD: 49 ML/MIN/1.73SQ M
GLUCOSE SERPL-MCNC: 424 MG/DL (ref 65–140)
GLUCOSE SERPL-MCNC: 465 MG/DL (ref 65–140)
GLUCOSE UR STRIP-MCNC: ABNORMAL MG/DL
HCO3 BLDV-SCNC: 29.2 MMOL/L (ref 24–30)
HCT VFR BLD AUTO: 37.1 % (ref 34.8–46.1)
HGB BLD-MCNC: 11.3 G/DL (ref 11.5–15.4)
HGB UR QL STRIP.AUTO: ABNORMAL
IMM GRANULOCYTES # BLD AUTO: 0.05 THOUSAND/UL (ref 0–0.2)
IMM GRANULOCYTES NFR BLD AUTO: 1 % (ref 0–2)
IRON SATN MFR SERPL: 15 % (ref 15–50)
IRON SERPL-MCNC: 40 UG/DL (ref 50–170)
KETONES UR STRIP-MCNC: ABNORMAL MG/DL
LEUKOCYTE ESTERASE UR QL STRIP: NEGATIVE
LYMPHOCYTES # BLD AUTO: 1.84 THOUSANDS/ÂΜL (ref 0.6–4.47)
LYMPHOCYTES NFR BLD AUTO: 19 % (ref 14–44)
MCH RBC QN AUTO: 23.5 PG (ref 26.8–34.3)
MCHC RBC AUTO-ENTMCNC: 30.5 G/DL (ref 31.4–37.4)
MCV RBC AUTO: 77 FL (ref 82–98)
MONOCYTES # BLD AUTO: 0.63 THOUSAND/ÂΜL (ref 0.17–1.22)
MONOCYTES NFR BLD AUTO: 7 % (ref 4–12)
NEUTROPHILS # BLD AUTO: 6.89 THOUSANDS/ÂΜL (ref 1.85–7.62)
NEUTS SEG NFR BLD AUTO: 69 % (ref 43–75)
NITRITE UR QL STRIP: NEGATIVE
NON-SQ EPI CELLS URNS QL MICRO: ABNORMAL /HPF
NRBC BLD AUTO-RTO: 0 /100 WBCS
O2 CT BLDV-SCNC: 11.6 ML/DL
P AXIS: 61 DEGREES
PCO2 BLDV: 41.9 MM HG (ref 42–50)
PH BLDV: 7.46 [PH] (ref 7.3–7.4)
PH UR STRIP.AUTO: 6 [PH] (ref 4.5–8)
PLATELET # BLD AUTO: 437 THOUSANDS/UL (ref 149–390)
PMV BLD AUTO: 10.3 FL (ref 8.9–12.7)
PO2 BLDV: 33.8 MM HG (ref 35–45)
POTASSIUM SERPL-SCNC: 3.6 MMOL/L (ref 3.5–5.3)
PR INTERVAL: 160 MS
PROT SERPL-MCNC: 7.5 G/DL (ref 6.4–8.4)
PROT UR STRIP-MCNC: >=300 MG/DL
QRS AXIS: 29 DEGREES
QRSD INTERVAL: 86 MS
QT INTERVAL: 382 MS
QTC INTERVAL: 507 MS
RBC # BLD AUTO: 4.8 MILLION/UL (ref 3.81–5.12)
RBC #/AREA URNS AUTO: ABNORMAL /HPF
RSV RNA RESP QL NAA+PROBE: NEGATIVE
SARS-COV-2 RNA RESP QL NAA+PROBE: NEGATIVE
SODIUM SERPL-SCNC: 133 MMOL/L (ref 135–147)
SP GR UR STRIP.AUTO: >=1.03 (ref 1–1.03)
T WAVE AXIS: 43 DEGREES
TIBC SERPL-MCNC: 266 UG/DL (ref 250–450)
UROBILINOGEN UR QL STRIP.AUTO: 0.2 E.U./DL
VENTRICULAR RATE: 106 BPM
WBC # BLD AUTO: 9.73 THOUSAND/UL (ref 4.31–10.16)
WBC #/AREA URNS AUTO: ABNORMAL /HPF
WBC CLUMPS # UR AUTO: PRESENT /UL

## 2023-02-28 RX ORDER — ASPIRIN 81 MG/1
81 TABLET ORAL DAILY
Status: DISCONTINUED | OUTPATIENT
Start: 2023-03-01 | End: 2023-03-08 | Stop reason: HOSPADM

## 2023-02-28 RX ORDER — METFORMIN HYDROCHLORIDE 500 MG/1
1000 TABLET, EXTENDED RELEASE ORAL 2 TIMES DAILY WITH MEALS
Status: DISCONTINUED | OUTPATIENT
Start: 2023-03-01 | End: 2023-03-07

## 2023-02-28 RX ORDER — FERROUS SULFATE 325(65) MG
325 TABLET ORAL 2 TIMES DAILY WITH MEALS
Status: DISCONTINUED | OUTPATIENT
Start: 2023-03-01 | End: 2023-03-08 | Stop reason: HOSPADM

## 2023-02-28 RX ORDER — POLYETHYLENE GLYCOL 3350 17 G/17G
17 POWDER, FOR SOLUTION ORAL 2 TIMES DAILY PRN
Status: DISCONTINUED | OUTPATIENT
Start: 2023-02-28 | End: 2023-03-06

## 2023-02-28 RX ORDER — LABETALOL HYDROCHLORIDE 5 MG/ML
10 INJECTION, SOLUTION INTRAVENOUS EVERY 6 HOURS PRN
Status: DISCONTINUED | OUTPATIENT
Start: 2023-02-28 | End: 2023-03-08 | Stop reason: HOSPADM

## 2023-02-28 RX ORDER — METOPROLOL TARTRATE 50 MG/1
50 TABLET, FILM COATED ORAL EVERY 12 HOURS SCHEDULED
Status: DISCONTINUED | OUTPATIENT
Start: 2023-02-28 | End: 2023-03-08 | Stop reason: HOSPADM

## 2023-02-28 RX ORDER — BUDESONIDE AND FORMOTEROL FUMARATE DIHYDRATE 160; 4.5 UG/1; UG/1
1 AEROSOL RESPIRATORY (INHALATION) 2 TIMES DAILY
Status: DISCONTINUED | OUTPATIENT
Start: 2023-03-01 | End: 2023-03-08 | Stop reason: HOSPADM

## 2023-02-28 RX ORDER — ATORVASTATIN CALCIUM 40 MG/1
40 TABLET, FILM COATED ORAL DAILY
Status: DISCONTINUED | OUTPATIENT
Start: 2023-03-01 | End: 2023-03-08 | Stop reason: HOSPADM

## 2023-02-28 RX ORDER — LOSARTAN POTASSIUM 50 MG/1
100 TABLET ORAL DAILY
Status: DISCONTINUED | OUTPATIENT
Start: 2023-03-01 | End: 2023-03-08 | Stop reason: HOSPADM

## 2023-02-28 RX ORDER — ENOXAPARIN SODIUM 100 MG/ML
40 INJECTION SUBCUTANEOUS DAILY
Status: DISCONTINUED | OUTPATIENT
Start: 2023-03-01 | End: 2023-03-01

## 2023-02-28 RX ADMIN — SODIUM CHLORIDE 1000 ML: 0.9 INJECTION, SOLUTION INTRAVENOUS at 19:00

## 2023-02-28 RX ADMIN — SODIUM CHLORIDE 1000 ML: 0.9 INJECTION, SOLUTION INTRAVENOUS at 16:36

## 2023-02-28 RX ADMIN — METOPROLOL TARTRATE 50 MG: 50 TABLET, FILM COATED ORAL at 23:10

## 2023-02-28 SDOH — ECONOMIC STABILITY - HOUSING INSECURITY: HOMELESSNESS UNSPECIFIED: Z59.00

## 2023-02-28 NOTE — ED PROVIDER NOTES
History  Chief Complaint   Patient presents with   • Medical Problem     Pt presents to the ED by EMS  Pt reports living at the [facility] since Friday and since feeling super tired, decreased appetite  Hx DM EMS reports BS 18       56 yo F PMHx with insulin-dependent diabetes, hypertension, polymyalgia rheumatica on prednisone, asthma presents to the ED complaining of 4 days of decreased appetite  Patient states she has been generally weak and feeling unwell for the past 4 days  Because of this she has not been taking much food p o  and has not taken any insulin for at least 4 days  She has been more thirsty and drinking more fluids, juice and water  She is currently experiencing homelessness and has been staying in the public spaces at the [facility]  She says that her son, who is also homeless, does occasionally help her and he has had to physically help her up to take her to the bathroom  Otherwise she has been sitting in a wheelchair and unable to physically stand up on her own  Because of this she has been incontinent of urine and wheelchair  She denies any fevers or chills endorses nausea but no vomiting  She states she has not been able to take her prednisone and an unknown amount of time  Prior to Admission Medications   Prescriptions Last Dose Informant Patient Reported? Taking?    Blood Glucose Monitoring Suppl (OneTouch Verio) w/Device KIT   No No   Sig: Use 3 (three) times a day for 14 days   Blood Pressure Monitor KIT   No No   Sig: Check blood pressures BID   Cholecalciferol (HM Vitamin D3) 100 MCG (4000 UT) CAPS   No No   Si units daily   Continuous Blood Gluc  (FreeStyle Denisa 2 [place]) ERIC   No No   Sig: Use 1 each 4 (four) times a day   Continuous Blood Gluc Sensor (FreeStyle Denisa 2 Sensor) [facility]   No No   Sig: Use 1 each every 14 (fourteen) days   Insulin Regular Human, Conc, (HumuLIN R U-500 KwikPen) 500 units/mL CONCENTRATED U-500 injection pen   No No   Sig: INJECT SUBCUTANEOUSLY 80   UNITS BEFORE BREAKFAST AND  20 UNITS BEFORE DINNER   Lancets (OneTouch Delica Plus [id number]) MISC   No No   Sig: Use 1 each 3 (three) times a day   albuterol (Proventil HFA) 90 mcg/act inhaler   No No   Sig: Inhale 2 puffs every 6 (six) hours as needed for wheezing for up to 14 days For another 24 hours use every 4 hours standing   aspirin (ECOTRIN LOW STRENGTH) 81 mg EC tablet   No No   Sig: Take 1 tablet (81 mg total) by mouth daily for 14 days   atorvastatin (LIPITOR) 40 mg tablet   No No   Sig: Take 1 tablet (40 mg total) by mouth daily for 14 days   budesonide-formoterol (Symbicort) 160-4 5 mcg/act inhaler   No No   Sig: Inhale 1 puff 2 (two) times a day for 14 days Rinse mouth after use     diltiazem (CARDIZEM CD) 240 mg 24 hr capsule   No No   Sig: Take 2 capsules (480 mg total) by mouth daily for 14 days   furosemide (LASIX) 20 mg tablet   No No   Sig: Take 1 tablet (20 mg total) by mouth daily for 14 days   losartan (COZAAR) 100 MG tablet   No No   Sig: Take 1 tablet (100 mg total) by mouth daily   metFORMIN (GLUCOPHAGE-XR) 500 mg 24 hr tablet   No No   Sig: Take 2 tablets (1,000 mg total) by mouth 2 (two) times a day with meals   metoprolol tartrate (LOPRESSOR) 50 mg tablet   No No   Sig: Take 1 tablet (50 mg total) by mouth every 12 (twelve) hours      Facility-Administered Medications: None       Past Medical History:   Diagnosis Date   • Anemia    • Arthritis    • Diabetes mellitus (HCC)    • Dyspnea on exertion    • Hyperlipidemia    • Hypertension    • Legally blind 2010   • Mild intermittent asthma with exacerbation 8/4/2018   • Miscarriage     x 3   • Polymyalgia rheumatica ([facility] [address] ) 11/24/2021   • Seizures ([facility] [address] )    • Sickle cell trait ([facility] [address] )    • Sleep apnea    • Stage 3a chronic kidney disease ([facility] [address] ) 5/19/2022   • Thyroid nodule 3/6/2020       Past Surgical History:   Procedure Laterality Date   • CATARACT EXTRACTION Bilateral     august and september   • EYE SURGERY     • HYSTERECTOMY      37   • OOPHORECTOMY Left     37   • MI LIGATION/BIOPSY TEMPORAL ARTERY Bilateral 10/17/2019    Procedure: BIOPSY ARTERY TEMPORAL;  Surgeon: Mis Jeter DO;  Location: [facility];  Service: Vascular   • US GUIDED THYROID BIOPSY  5/13/2020       Family History   Problem Relation Age of Onset   • Heart attack Mother    • Heart disease Mother    • Hypertension Mother    • No Known Problems Father    • Heart disease Sister    • No Known Problems Brother    • No Known Problems Son    • No Known Problems Daughter    • Heart attack Maternal Grandmother    • Heart disease Maternal Grandmother    • Diabetes Other    • Heart attack Other    • No Known Problems Sister    • No Known Problems Sister    • No Known Problems Paternal Aunt    • No Known Problems Son    • Cancer Neg Hx    • Stroke Neg Hx      I have reviewed and agree with the history as documented  E-Cigarette/Vaping   • E-Cigarette Use Never User      E-Cigarette/Vaping Substances     Social History     Tobacco Use   • Smoking status: Never   • Smokeless tobacco: Never   Vaping Use   • Vaping Use: Never used   Substance Use Topics   • Alcohol use: Never     Alcohol/week: 0 0 standard drinks   • Drug use: No        Review of Systems   Constitutional: Positive for fatigue  Negative for chills and fever  HENT: Negative for ear pain and sore throat  Eyes: Negative for pain and visual disturbance  Respiratory: Negative for cough and shortness of breath  Cardiovascular: Negative for chest pain and palpitations  Gastrointestinal: Negative for abdominal pain and vomiting  Genitourinary: Negative for dysuria and hematuria  Musculoskeletal: Positive for arthralgias and myalgias  Negative for back pain  Skin: Negative for color change and rash  Neurological: Positive for weakness  Negative for seizures and syncope  All other systems reviewed and are negative        Physical Exam  ED Triage Vitals [02/28/23 1523]   Temperature Pulse Respirations Blood Pressure SpO2   98 °F (36 7 °C) (!) 109 20 164/79 98 %      Temp Source Heart Rate Source Patient Position - Orthostatic VS BP Location FiO2 (%)   Oral Monitor Lying Right arm --      Pain Score       --             Orthostatic Vital Signs  Vitals:    02/28/23 1523   BP: 164/79   Pulse: (!) 109   Patient Position - Orthostatic VS: Lying       Physical Exam  Vitals and nursing note reviewed  Constitutional:       General: She is not in acute distress  Appearance: She is well-developed  She is obese  She is ill-appearing  HENT:      Head: Normocephalic and atraumatic  Eyes:      Conjunctiva/sclera: Conjunctivae normal    Cardiovascular:      Rate and Rhythm: Regular rhythm  Tachycardia present  Heart sounds: No murmur heard  Pulmonary:      Effort: Pulmonary effort is normal  No respiratory distress  Breath sounds: Normal breath sounds  No wheezing  Abdominal:      Palpations: Abdomen is soft  Tenderness: There is no abdominal tenderness  Musculoskeletal:         General: No swelling  Cervical back: Neck supple  Skin:     General: Skin is warm and dry  Capillary Refill: Capillary refill takes less than 2 seconds  Findings: Rash present  Rash is scaling  Comments: Dry scaling present to both lower extremities below the knee  Old wound present to left shin  No obvious purulence or drainage  No signs of cellulitis or open wounds  Neurological:      Mental Status: She is alert     Psychiatric:         Mood and Affect: Mood normal          ED Medications  Medications   sodium chloride 0 9 % bolus 1,000 mL (0 mL Intravenous Stopped 2/28/23 1836)   sodium chloride 0 9 % bolus 1,000 mL (1,000 mL Intravenous New Bag 2/28/23 1900)       Diagnostic Studies  Results Reviewed     Procedure Component Value Units Date/Time    Urine Microscopic [[id number]]  (Abnormal) Collected: 02/28/23 2010    Lab Status: Final result Specimen: Urine, Clean Catch Updated: 02/28/23 2027     RBC, UA Innumerable /hpf      WBC, UA Innumerable /hpf      Epithelial Cells None Seen /hpf      Bacteria, UA Occasional /hpf      WBC Clumps Present     Budding Yeast Present    Urine culture [[id number]] Collected: 02/28/23 2010    Lab Status: In process Specimen: Urine, Clean Catch Updated: 02/28/23 2027    Urine Macroscopic, POC [[id number]]  (Abnormal) Collected: 02/28/23 2010    Lab Status: Final result Specimen: Urine Updated: 02/28/23 2011     Color, UA Yellow     Clarity, UA Clear     pH, UA 6 0     Leukocytes, UA Negative     Nitrite, UA Negative     Protein, UA >=300 mg/dl      Glucose,  (1/2%) mg/dl      Ketones, UA 15 (1+) mg/dl      Urobilinogen, UA 0 2 E U /dl      Bilirubin, UA Negative     Occult Blood, UA Small     Specific Ruth, UA >=1 030    Narrative:      CLINITEK RESULT    FLU/RSV/COVID - if FLU/RSV clinically relevant [[id number]]  (Normal) Collected: 02/28/23 1817    Lab Status: Final result Specimen: Nares from Nose Updated: 02/28/23 1923     SARS-CoV-2 Negative     INFLUENZA A PCR Negative     INFLUENZA B PCR Negative     RSV PCR Negative    Narrative:      FOR PEDIATRIC PATIENTS - copy/paste COVID Guidelines URL to browser: [email or web address]    SARS-CoV-2 assay is a Nucleic Acid Amplification assay intended for the  qualitative detection of nucleic acid from SARS-CoV-2 in nasopharyngeal  swabs  Results are for the presumptive identification of SARS-CoV-2 RNA  Positive results are indicative of infection with SARS-CoV-2, the virus  causing COVID-19, but do not rule out bacterial infection or co-infection  with other viruses  Laboratories within the [place] and its  territories are required to report all positive results to the appropriate  public health authorities   Negative results do not preclude SARS-CoV-2  infection and should not be used as the sole basis for treatment or other  patient management decisions  Negative results must be combined with  clinical observations, patient history, and epidemiological information  This test has not been FDA cleared or approved  This test has been authorized by FDA under an Emergency Use Authorization  (EUA)  This test is only authorized for the duration of time the  declaration that circumstances exist justifying the authorization of the  emergency use of an in vitro diagnostic tests for detection of SARS-CoV-2  virus and/or diagnosis of COVID-19 infection under section 564(b)(1) of  the Act, 21 U  S C  957MGO-7(I)(8), unless the authorization is terminated  or revoked sooner  The test has been validated but independent review by FDA  and CLIA is pending  Test performed using [employer] GeneXpert: This RT-PCR assay targets N2,  a region unique to SARS-CoV-2  A conserved region in the E-gene was chosen  for pan-Sarbecovirus detection which includes SARS-CoV-2  According to CMS-2020-01-R, this platform meets the definition of high-throughput technology      Comprehensive metabolic panel [[id number]]  (Abnormal) Collected: 02/28/23 1639    Lab Status: Final result Specimen: Blood from Arm, Left Updated: 02/28/23 1727     Sodium 133 mmol/L      Potassium 3 6 mmol/L      Chloride 99 mmol/L      CO2 29 mmol/L      ANION GAP 5 mmol/L      BUN 10 mg/dL      Creatinine 1 16 mg/dL      Glucose 465 mg/dL      Calcium 9 5 mg/dL      Corrected Calcium 10 6 mg/dL      AST 18 U/L      ALT 16 U/L      Alkaline Phosphatase 165 U/L      Total Protein 7 5 g/dL      Albumin 2 6 g/dL      Total Bilirubin 0 59 mg/dL      eGFR 49 ml/min/1 73sq m     Narrative:      Flo guidelines for Chronic Kidney Disease (CKD):   •  Stage 1 with normal or high GFR (GFR > 90 mL/min/1 73 square meters)  •  Stage 2 Mild CKD (GFR = 60-89 mL/min/1 73 square meters)  •  Stage 3A Moderate CKD (GFR = 45-59 mL/min/1 73 square meters)  •  Stage 3B Moderate CKD (GFR = 30-44 mL/min/1 73 square meters)  •  Stage 4 Severe CKD (GFR = 15-29 mL/min/1 73 square meters)  •  Stage 5 End Stage CKD (GFR <15 mL/min/1 73 square meters)  Note: GFR calculation is accurate only with a steady state creatinine    Beta Hydroxybutyrate [[id number]]  (Abnormal) Collected: 02/28/23 1639    Lab Status: Final result Specimen: Blood from Arm, Left Updated: 02/28/23 1648     BETA-HYDROXYBUTYRATE 1 4 mmol/L     CBC and differential [[id number]]  (Abnormal) Collected: 02/28/23 1639    Lab Status: Final result Specimen: Blood from Arm, Left Updated: 02/28/23 1648     WBC 9 73 Thousand/uL      RBC 4 80 Million/uL      Hemoglobin 11 3 g/dL      Hematocrit 37 1 %      MCV 77 fL      MCH 23 5 pg      MCHC 30 5 g/dL      RDW 16 8 %      MPV 10 3 fL      Platelets 711 Thousands/uL      nRBC 0 /100 WBCs      Neutrophils Relative 69 %      Immat GRANS % 1 %      Lymphocytes Relative 19 %      Monocytes Relative 7 %      Eosinophils Relative 3 %      Basophils Relative 1 %      Neutrophils Absolute 6 89 Thousands/µL      Immature Grans Absolute 0 05 Thousand/uL      Lymphocytes Absolute 1 84 Thousands/µL      Monocytes Absolute 0 63 Thousand/µL      Eosinophils Absolute 0 25 Thousand/µL      Basophils Absolute 0 07 Thousands/µL     Blood gas, venous [[id number]]  (Abnormal) Collected: 02/28/23 1639    Lab Status: Final result Specimen: Blood from Arm, Left Updated: 02/28/23 1646     pH, Gab[place] 7 461     pCO2, Gab[place] 41 9 mm Hg      pO2, [place] 33 8 mm Hg      HCO3, [place] 29 2 mmol/L      Base Excess, Gab[place] 4 9 mmol/L      O2 Content, [place] 11 6 ml/dL      O2 HGB, VENOUS 66 4 %     POCT urinalysis dipstick [[id number]]     Lab Status: No result     Fingerstick Glucose (POCT) [[id number]]  (Abnormal) Collected: 02/28/23 1527    Lab Status: Final result Updated: 02/28/23 1527     POC Glucose 424 mg/dl                  No orders to display         Procedures  Procedures      ED Course  ED Course as of 02/28/23 2128 Tue Feb 28, 2023 2034 Contacted FP for admission                              SBIRT 22yo+    Flowsheet Row Most Recent Value   SBIRT (25 yo +)    In order to provide better care to our patients, we are screening all of our patients for alcohol and drug use  Would it be okay to ask you these screening questions? No Filed at: 02/28/2023 1544                Medical Decision Making  56 yo F PMHx with insulin-dependent diabetes, hypertension, polymyalgia rheumatica on prednisone, asthma presents to the ED complaining of 4 days of decreased appetite    DDx: HHNK, underlying viral infection, electrolyte abnormality, deconditioned status  Patient appears unwell but nontoxic  Physically unable to get up on her own, or walk on her own  Patient with symptomatic hyperglycemia otherwise not acidotic, no anion gap no significant electrolyte abnormalities  Despite this, patient with ambulatory dysfunction and unable to properly care for her medical comorbidities  Discussed case with medicine, patient admitted for eventual placement and treatment of hyperglycemia  Ambulatory dysfunction: acute illness or injury  Generalized weakness: acute illness or injury  Insulin dependent type 2 diabetes mellitus ([facility] ): acute illness or injury  Amount and/or Complexity of Data Reviewed  Labs: ordered  Risk  Decision regarding hospitalization              Disposition  Final diagnoses:   Generalized weakness   Ambulatory dysfunction   Insulin dependent type 2 diabetes mellitus ([facility] [address] )     Time reflects when diagnosis was documented in both MDM as applicable and the Disposition within this note     Time User Action Codes Description Comment    2/28/2023  8:56 PM Dylon Spine Add [R53 1] Generalized weakness     2/28/2023  8:56 PM Dylon Spine Add [R26 2] Ambulatory dysfunction     2/28/2023  8:57 PM Lisa Miranda Add [E10 9] Insulin dependent diabetes mellitus type IA ([facility] [address] )     2/28/2023  8:57 PM Dylon Spine Remove [E10 9] Insulin dependent diabetes mellitus type [facility] ([facility] )     2/28/2023  8:57 PM Woo Mayer Add [E11 9,  Z79 4] Insulin dependent type 2 diabetes mellitus [facility])       ED Disposition     ED Disposition   Admit    Condition   Stable    Date/Time   Tue Feb 28, 2023  8:57 PM    Comment   Case was discussed with Dr Jaden Carrasco and the patient's admission status was agreed to be Admission Status: observation status to the service of Dr Jaden Carrasco  Follow-up Information    None         Patient's Medications   Discharge Prescriptions    No medications on file     No discharge procedures on file  PDMP Review     None           ED Provider  Attending physically available and evaluated Derek Wing I managed the patient along with the ED Attending      Electronically Signed by         Farhad Barrera MD  02/28/23 3865

## 2023-03-01 ENCOUNTER — APPOINTMENT (OUTPATIENT)
Dept: NON INVASIVE DIAGNOSTICS | Facility: HOSPITAL | Age: 63
End: 2023-03-01

## 2023-03-01 ENCOUNTER — APPOINTMENT (OUTPATIENT)
Dept: RADIOLOGY | Facility: HOSPITAL | Age: 63
End: 2023-03-01

## 2023-03-01 PROBLEM — R60.0 BILATERAL LOWER EXTREMITY EDEMA: Status: ACTIVE | Noted: 2023-03-01

## 2023-03-01 PROBLEM — R05.1 ACUTE COUGH: Status: ACTIVE | Noted: 2023-03-01

## 2023-03-01 PROBLEM — R05.9 COUGH: Status: ACTIVE | Noted: 2023-03-01

## 2023-03-01 PROBLEM — J45.20 MILD INTERMITTENT ASTHMA WITHOUT COMPLICATION: Status: ACTIVE | Noted: 2018-08-04

## 2023-03-01 LAB
ANION GAP SERPL CALCULATED.3IONS-SCNC: 6 MMOL/L (ref 4–13)
AORTIC ROOT: 3.5 CM
AORTIC VALVE MEAN VELOCITY: 12.6 M/S
APICAL FOUR CHAMBER EJECTION FRACTION: 54 %
ASCENDING AORTA: 4.3 CM
AV LVOT MEAN GRADIENT: 2 MMHG
AV LVOT PEAK GRADIENT: 3 MMHG
AV MEAN GRADIENT: 7 MMHG
AV PEAK GRADIENT: 13 MMHG
AV VALVE AREA: 2.9 CM2
AV VELOCITY RATIO: 0.51
BUN SERPL-MCNC: 11 MG/DL (ref 5–25)
CALCIUM SERPL-MCNC: 8.7 MG/DL (ref 8.3–10.1)
CHLORIDE SERPL-SCNC: 102 MMOL/L (ref 96–108)
CHOLEST SERPL-MCNC: 157 MG/DL
CO2 SERPL-SCNC: 27 MMOL/L (ref 21–32)
CREAT SERPL-MCNC: 1.08 MG/DL (ref 0.6–1.3)
DOP CALC AO PEAK VEL: 1.79 M/S
DOP CALC AO VTI: 34.76 CM
DOP CALC LVOT AREA: 4.91 CM2
DOP CALC LVOT DIAMETER: 2.5 CM
DOP CALC LVOT PEAK VEL VTI: 20.53 CM
DOP CALC LVOT PEAK VEL: 0.92 M/S
DOP CALC LVOT STROKE VOLUME: 100.73 CM3
E WAVE DECELERATION TIME: 217 MS
ERYTHROCYTE [DISTWIDTH] IN BLOOD BY AUTOMATED COUNT: 16.7 % (ref 11.6–15.1)
EST. AVERAGE GLUCOSE BLD GHB EST-MCNC: 249 MG/DL
FOLATE SERPL-MCNC: 17.5 NG/ML (ref 3.1–17.5)
FRACTIONAL SHORTENING: 33 (ref 28–44)
GFR SERPL CREATININE-BSD FRML MDRD: 54 ML/MIN/1.73SQ M
GLUCOSE SERPL-MCNC: 227 MG/DL (ref 65–140)
GLUCOSE SERPL-MCNC: 227 MG/DL (ref 65–140)
GLUCOSE SERPL-MCNC: 269 MG/DL (ref 65–140)
GLUCOSE SERPL-MCNC: 314 MG/DL (ref 65–140)
GLUCOSE SERPL-MCNC: 341 MG/DL (ref 65–140)
GLUCOSE SERPL-MCNC: 347 MG/DL (ref 65–140)
GLUCOSE SERPL-MCNC: 403 MG/DL (ref 65–140)
HBA1C MFR BLD: 10.3 %
HCT VFR BLD AUTO: 33.8 % (ref 34.8–46.1)
HDLC SERPL-MCNC: 31 MG/DL
HGB BLD-MCNC: 9.9 G/DL (ref 11.5–15.4)
INTERVENTRICULAR SEPTUM IN DIASTOLE (PARASTERNAL SHORT AXIS VIEW): 1.6 CM
INTERVENTRICULAR SEPTUM: 1.6 CM (ref 0.6–1.1)
IVC: 24 MM
LAAS-AP2: 19.8 CM2
LAAS-AP4: 25.3 CM2
LDLC SERPL CALC-MCNC: 103 MG/DL (ref 0–100)
LEFT ATRIUM SIZE: 3.3 CM
LEFT INTERNAL DIMENSION IN SYSTOLE: 3.9 CM (ref 2.1–4)
LEFT VENTRICULAR INTERNAL DIMENSION IN DIASTOLE: 5.8 CM (ref 3.5–6)
LEFT VENTRICULAR POSTERIOR WALL IN END DIASTOLE: 1.5 CM
LEFT VENTRICULAR STROKE VOLUME: 96 ML
LVSV (TEICH): 96 ML
MCH RBC QN AUTO: 23 PG (ref 26.8–34.3)
MCHC RBC AUTO-ENTMCNC: 29.3 G/DL (ref 31.4–37.4)
MCV RBC AUTO: 78 FL (ref 82–98)
MV E'TISSUE VEL-SEP: 9 CM/S
MV PEAK A VEL: 0.99 M/S
MV PEAK E VEL: 72 CM/S
MV STENOSIS PRESSURE HALF TIME: 63 MS
MV VALVE AREA P 1/2 METHOD: 3.49
NT-PROBNP SERPL-MCNC: 95 PG/ML
PLATELET # BLD AUTO: 390 THOUSANDS/UL (ref 149–390)
PMV BLD AUTO: 10.8 FL (ref 8.9–12.7)
POTASSIUM SERPL-SCNC: 3.5 MMOL/L (ref 3.5–5.3)
PREALB SERPL-MCNC: 10.3 MG/DL (ref 18–40)
RA PRESSURE ESTIMATED: 8 MMHG
RBC # BLD AUTO: 4.31 MILLION/UL (ref 3.81–5.12)
RIGHT ATRIUM AREA SYSTOLE A4C: 22.7 CM2
RIGHT VENTRICLE ID DIMENSION: 3.8 CM
RV PSP: 39 MMHG
SL CV LEFT ATRIUM LENGTH A2C: 6.3 CM
SL CV LV EF: 60
SL CV PED ECHO LEFT VENTRICLE DIASTOLIC VOLUME (MOD BIPLANE) 2D: 164 ML
SL CV PED ECHO LEFT VENTRICLE SYSTOLIC VOLUME (MOD BIPLANE) 2D: 67 ML
SODIUM SERPL-SCNC: 135 MMOL/L (ref 135–147)
TR MAX PG: 31 MMHG
TR PEAK VELOCITY: 2.8 M/S
TRICUSPID ANNULAR PLANE SYSTOLIC EXCURSION: 1.8 CM
TRICUSPID VALVE PEAK REGURGITATION VELOCITY: 2.8 M/S
TRIGL SERPL-MCNC: 117 MG/DL
VIT B12 SERPL-MCNC: 1587 PG/ML (ref 100–900)
WBC # BLD AUTO: 10.97 THOUSAND/UL (ref 4.31–10.16)

## 2023-03-01 RX ORDER — INSULIN LISPRO 100 [IU]/ML
4-20 INJECTION, SOLUTION INTRAVENOUS; SUBCUTANEOUS
Status: DISCONTINUED | OUTPATIENT
Start: 2023-03-01 | End: 2023-03-08 | Stop reason: HOSPADM

## 2023-03-01 RX ORDER — PREDNISONE 1 MG/1
4 TABLET ORAL DAILY
Status: DISCONTINUED | OUTPATIENT
Start: 2023-03-01 | End: 2023-03-08 | Stop reason: HOSPADM

## 2023-03-01 RX ORDER — INSULIN GLARGINE 100 [IU]/ML
10 INJECTION, SOLUTION SUBCUTANEOUS
Status: DISCONTINUED | OUTPATIENT
Start: 2023-03-01 | End: 2023-03-02

## 2023-03-01 RX ORDER — ENOXAPARIN SODIUM 100 MG/ML
60 INJECTION SUBCUTANEOUS DAILY
Status: DISCONTINUED | OUTPATIENT
Start: 2023-03-01 | End: 2023-03-08 | Stop reason: HOSPADM

## 2023-03-01 RX ADMIN — INSULIN GLARGINE 10 UNITS: 100 INJECTION, SOLUTION SUBCUTANEOUS at 22:27

## 2023-03-01 RX ADMIN — METOPROLOL TARTRATE 50 MG: 50 TABLET, FILM COATED ORAL at 09:16

## 2023-03-01 RX ADMIN — ATORVASTATIN CALCIUM 40 MG: 40 TABLET, FILM COATED ORAL at 09:16

## 2023-03-01 RX ADMIN — LABETALOL HYDROCHLORIDE 10 MG: 5 INJECTION, SOLUTION INTRAVENOUS at 06:17

## 2023-03-01 RX ADMIN — INSULIN LISPRO 8 UNITS: 100 INJECTION, SOLUTION INTRAVENOUS; SUBCUTANEOUS at 13:43

## 2023-03-01 RX ADMIN — INSULIN LISPRO 16 UNITS: 100 INJECTION, SOLUTION INTRAVENOUS; SUBCUTANEOUS at 11:37

## 2023-03-01 RX ADMIN — LOSARTAN POTASSIUM 100 MG: 50 TABLET, FILM COATED ORAL at 09:16

## 2023-03-01 RX ADMIN — PREDNISONE 4 MG: 1 TABLET ORAL at 09:16

## 2023-03-01 RX ADMIN — FERROUS SULFATE TAB 325 MG (65 MG ELEMENTAL FE) 325 MG: 325 (65 FE) TAB at 09:17

## 2023-03-01 RX ADMIN — FERROUS SULFATE TAB 325 MG (65 MG ELEMENTAL FE) 325 MG: 325 (65 FE) TAB at 17:01

## 2023-03-01 RX ADMIN — ENOXAPARIN SODIUM 60 MG: 60 INJECTION SUBCUTANEOUS at 09:17

## 2023-03-01 RX ADMIN — BUDESONIDE AND FORMOTEROL FUMARATE DIHYDRATE 1 PUFF: 160; 4.5 AEROSOL RESPIRATORY (INHALATION) at 17:01

## 2023-03-01 RX ADMIN — METOPROLOL TARTRATE 50 MG: 50 TABLET, FILM COATED ORAL at 21:00

## 2023-03-01 RX ADMIN — ASPIRIN 81 MG: 81 TABLET, COATED ORAL at 09:16

## 2023-03-01 RX ADMIN — BUDESONIDE AND FORMOTEROL FUMARATE DIHYDRATE 1 PUFF: 160; 4.5 AEROSOL RESPIRATORY (INHALATION) at 09:17

## 2023-03-01 RX ADMIN — METFORMIN HYDROCHLORIDE 1000 MG: 500 TABLET, EXTENDED RELEASE ORAL at 09:16

## 2023-03-01 RX ADMIN — INSULIN LISPRO 8 UNITS: 100 INJECTION, SOLUTION INTRAVENOUS; SUBCUTANEOUS at 17:37

## 2023-03-01 RX ADMIN — INSULIN LISPRO 16 UNITS: 100 INJECTION, SOLUTION INTRAVENOUS; SUBCUTANEOUS at 09:17

## 2023-03-01 RX ADMIN — METFORMIN HYDROCHLORIDE 1000 MG: 500 TABLET, EXTENDED RELEASE ORAL at 17:01

## 2023-03-01 NOTE — ASSESSMENT & PLAN NOTE
Initial Hgb 11 3  - H/O chronic anemia not on home FeSO4 supplement  - 2/28/23 iron panel: Fe 40, FeSat 15%, TIBC 266, Ferritin 50    Assessment: Chronic anemia likely secondary to chronic disease vs CKD vs iron deficiency   Low suspicion for acute blood loss    Plan:  - Monitor Hgb  - Start Fe supplement  - Miralax PRN for iron-induced constipation  - Will check B12, folate    Results from last 7 days   Lab Units 03/01/23  0415 02/28/23  1639   HEMOGLOBIN g/dL 9 9* 11 3*   HEMATOCRIT % 33 8* 37 1

## 2023-03-01 NOTE — ASSESSMENT & PLAN NOTE
Initial sCr 1 16  - Baseline sCr 1 0-1 1    Assessment: stable CKD-3    Plan:  - Monitor renal function  - Avoid nephrotoxic drugs    Results from last 7 days   Lab Units 02/28/23  1639   BUN mg/dL 10   CREATININE mg/dL 1 16   EGFR ml/min/1 73sq m 49

## 2023-03-01 NOTE — ASSESSMENT & PLAN NOTE
Initial /79  - Patient reports has not been taking any of her medication for 1-2 weeks due to not poor diet and not feeling well  - Home regimen: Losartan 100 mg daily, metoprolol tartrate 50 mg twice daily  - Follows cardiology outpatient, last visit 22    Assessment:  - Poorly controlled BP on admission, likely due to not taking her medications  - Goal BP < 140/90  - Most recent Blood Pressure: 252/16   - Systolic (31XMI), OHQ:656 , Min:155 , FXO:234   - Diastolic (09GOB), FL, Min:73, Max:92    Plan:  - Resume home meds  - Labetalol 10 mg Q6H PRN for SBP > 180  - Vitals per unit routine

## 2023-03-01 NOTE — ASSESSMENT & PLAN NOTE
H/O Stable ascending thoracic aortic aneurysm  - Patient denies any chest pain on admission  - 2/18/22 CT chest: stable ectasia of the ascending thoracic aorta measuring 4 2 cm  - Follows cardiology outpatient, last visit 4/27/22    Plan:  - Monitor clinically  - Low threshold for repeat CT chest w/ contrast if patient develops acute onset chest pain

## 2023-03-01 NOTE — ASSESSMENT & PLAN NOTE
Patient reports progressively worsening generalized weakness started several month ago and was recently evicted from her apartment early Feb 2023 after sustaining a fall at home  Patient states she has difficulty ambulating, currently wheelchair dependent, and also has difficulty with standing up and sitting down from the toilet  - 2/28/23 Prealbumin 10 3 (low)  - Initial exam: 3/5 strength bilateral LEs and 4/5 strength bilateral UEs  No focal deficits  CN II-XII grossly intact  - 3/1 Rapid Response:slid out of bed and landed on bottom  No headstrike  Assessment: Generalized weakness likely multifactorial in setting of nutrition, chronic diseases, and comorbidities      Plan:  - Nutrition supplement with Glucerna 3 times daily with meals  - Fall precaution  - PT/OT consulted for rehab recommendation

## 2023-03-01 NOTE — ASSESSMENT & PLAN NOTE
H/O polymyalgia rheumatica  - Patient reports has not been taking any of her medication for 1-2 weeks due to not poor diet and not feeling well  - Home regimen: Prednisone 4mg daily (became immoble on 2mg dose due to severe aches/pains)  - Follows outpatient neurology (last visit 6/9/22) and rheumatology (last visit 2/8/22)    Plan:  - Resume home medication  - Monitor BG

## 2023-03-01 NOTE — UTILIZATION REVIEW
Initial Clinical Review    OBSERVATION WRITTEN 2/28/23 @ 2058 CONVERTED TO INPATIENT ADMISSION 3/1/23 @ 1511 DUE TO FURTHER DIAGNOSTIC WORKUP REQUIRED FOR WEAKNESS AND HYPERGLYCEMIA, REQUIRING AT LEAST A 2 MIDNIGHT STAY  Admission: Date/Time/Statement:   Admission Orders (From admission, onward)     Ordered        03/01/23 1511  Inpatient Admission  Once            02/28/23 2058  Place in Observation  Once                      Orders Placed This Encounter   Procedures   • Inpatient Admission     Standing Status:   Standing     Number of Occurrences:   1     Order Specific Question:   Level of Care     Answer:   Med Surg [16]     Order Specific Question:   Estimated length of stay     Answer:   More than 2 Midnights     Order Specific Question:   Certification     Answer:   I certify that inpatient services are medically necessary for this patient for a duration of greater than two midnights  See H&P and MD Progress Notes for additional information about the patient's course of treatment  ED Arrival Information     Expected   -    Arrival   2/28/2023 15:13    Acuity   Urgent            Means of arrival   Ambulance    Escorted by   [facility]    Service   Hospitalist    Admission type   Emergency            Arrival complaint   Decrease of appetite            Chief Complaint   Patient presents with   • Medical Problem     Pt presents to the ED by EMS  Pt reports living at the [facility] since Friday and since feeling super tired, decreased appetite  Hx DM EMS reports   Initial Presentation: 57 y o  female  who presented via EMS to [address] [place] [address] ED  Initially admitted Observation status then changed to Inpatient to med surg dt Weakness and Hyperglycemia    PMHx: homelessness, insulin-dependent T2DM with retinopathy causing legal blindness bilaterally, polymyalgia rheumatica on maintenance prednisone, absence seizure, cerebral artery aneurysm, migraine w/o aura, MOHAN, asthma, thoracic aortic aneurysm, HTN, HLD, CKD-3 with microalbuminuria, and morbid obesity  Presented with fatigue and decreased appetite associated with poor diet, poor sleep, and inability to take care of herself  Pt has had multiple ED visits and prior admission for same  Patient reports progressively worsening generalized weakness started several month ago and was recently evicted from her apartment early Feb 2023 after sustaining a fall at home  Patient states she has been homeless since and has been living in warm shelter which was unable to accommodate her medical conditions  Patient states she has difficulty ambulating, currently wheelchair dependent, and also has difficulty with standing up and sitting down from the toilet  Patient also reports chronic bilateral lower extremity edema started several years ago but noted acute nonproductive cough a/w exertional dyspnea started several weeks ago  Patient reports has not been taking any of her medication for 1-2 weeks due to not poor diet and not feeling well  In ED, blood sugar 424, Na 133, UA positive for glucose, ketones, blood, protein, WBC and RBC  Plan:  PT OT eval, start accuchecks w/ SSI, monitor BP and give antihypertensives, monitor hgb dt chronic anemia, order CXR, check BNP, repeat echo, diuresis, monitor IO, CPAP, fu on cxs and hold on abx for now  3/1 Inpatient Admission:  Rapid response called today dt fall  She was getting OOB to use the bathroom when she sat on the side of the bed (assisted by nursing staff) and slid from the side of the bed to the floor  She is AAO x 3, denies any pain or head-strike  BG normal, VSS  Assisted back to bed using Herrenschmiede  Date:   3/2  Day 2:    Pt expresses concerns over falling again, offers no new complaints today  BL LE pitting +2-3 edema noted   Await PT OT lashay, Cm following, continue diuresis and monitor volume status, fu on urine cx and start abx if positive, monitor renal function, accuchecks, resume home meds      ED Triage Vitals   Temperature Pulse Respirations Blood Pressure SpO2   02/28/23 1523 02/28/23 1523 02/28/23 1523 02/28/23 1523 02/28/23 1523   98 °F (36 7 °C) (!) 109 20 164/79 98 %      Temp Source Heart Rate Source Patient Position - Orthostatic VS BP Location FiO2 (%)   02/28/23 1523 02/28/23 1523 02/28/23 1523 02/28/23 1523 --   Oral Monitor Lying Right arm       Pain Score       03/01/23 1100       No Pain          Wt Readings from Last 1 Encounters:   03/01/23 (!) 152 kg (335 lb)     Additional Vital Signs:   Date/Time Temp Pulse Resp BP MAP (mmHg) SpO2 O2 Device Patient Position - Orthostatic VS   03/02/23 08:29:03 99 3 °F (37 4 °C) 90 -- 155/79 104 94 % -- --   03/02/23 0829 -- 104 -- 155/79 -- -- -- --   03/01/23 2300 -- -- -- -- -- -- None (Room air) --   03/01/23 21:00:53 99 4 °F (37 4 °C) 91 -- 157/82 107 88 % Abnormal  -- --   03/01/23 20:57:47 99 4 °F (37 4 °C) 95 -- 157/82 107 96 % -- --   03/01/23 15:37:30 98 5 °F (36 9 °C) 83 -- 157/82 107 95 % -- --   03/01/23 14:06:29 -- 86 -- 158/82 -- 95 % -- --   03/01/23 14:06:17 -- 87 -- 158/82 107 95 % -- --   03/01/23 14:03:03 -- 88 -- 159/81 -- 95 % -- --   03/01/23 14:00:49 98 1 °F (36 7 °C) 91 -- 159/81 107 95 % -- --   03/01/23 14:00:37 -- -- -- 159/81 -- -- -- --   03/01/23 14:00:07 98 1 °F (36 7 °C) -- -- -- -- -- -- --   03/01/23 13:59:38 -- 94 -- -- -- 93 % -- --   03/01/23 12:19:39 98 8 °F (37 1 °C) 86 -- 159/77 104 95 % -- --   03/01/23 0914 -- 88 18 165/73 -- 93 % None (Room air) --   03/01/23 0903 -- 86 -- 190/92 Abnormal  -- -- -- --   03/01/23 0600 -- 94 18 190/92 Abnormal  132 96 % None (Room air) Lying   03/01/23 0400 -- 86 18 142/71 100 91 % None (Room air) Lying   03/01/23 0115 -- 87 18 131/63 90 92 % None (Room air) Lying   03/01/23 0015 -- 96 18 180/77 Abnormal  110 95 % None (Room air) Lying   02/28/23 2315 -- 108 Abnormal  19 182/86 Abnormal  124 95 % None (Room air) Lying   02/28/23 2310 -- 110 Abnormal  -- 182/86 Abnormal  -- -- -- --   02/28/23 1523 98 °F (36 7 °C) 109 Abnormal  20 164/79 114 98 % None (Room air) Lying     Pertinent Labs/Diagnostic Test Results:   3/1 ECHO:   •  Left Ventricle: Left ventricular cavity size is mildly dilated  Wall thickness is mildly increased  There is mild concentric hypertrophy  The left ventricular ejection fraction is 60%  Systolic function is normal  Wall motion is normal  Diastolic function is mildly abnormal, consistent with grade I (abnormal) relaxation  •  Right Ventricle: Right ventricular cavity size is normal  Systolic function is normal   •  Aortic Valve: There is aortic valve sclerosis  •  Aorta: The aortic root is normal in size  The ascending aorta is mildly dilated  The ascending aorta is 4 3 cm  •  Pulmonary Artery: The pulmonary artery systolic pressure is mildly increased    2/28 EKG: Sinus tachycardia with occasional Premature ventricular complexes  Nonspecific ST abnormality  Baseline Artifact  Abnormal ECG    XR chest portable    (Results Pending)     Results from last 7 days   Lab Units 02/28/23  1817   SARS-COV-2  Negative     Results from last 7 days   Lab Units 03/02/23 0437 03/01/23  0415 02/28/23  1639   WBC Thousand/uL 11 98* 10 97* 9 73   HEMOGLOBIN g/dL 9 7* 9 9* 11 3*   HEMATOCRIT % 33 8* 33 8* 37 1   PLATELETS Thousands/uL 372 390 437*   NEUTROS ABS Thousands/µL  --   --  6 89         Results from last 7 days   Lab Units 03/02/23  0437 03/01/23  0415 02/28/23  1639   SODIUM mmol/L 139 135 133*   POTASSIUM mmol/L 3 2* 3 5 3 6   CHLORIDE mmol/L 105 102 99   CO2 mmol/L 29 27 29   ANION GAP mmol/L 5 6 5   BUN mg/dL 10 11 10   CREATININE mg/dL 1 02 1 08 1 16   EGFR ml/min/1 73sq m 58 54 49   CALCIUM mg/dL 8 8 8 7 9 5     Results from last 7 days   Lab Units 02/28/23  1639   AST U/L 18   ALT U/L 16   ALK PHOS U/L 165*   TOTAL PROTEIN g/dL 7 5   ALBUMIN g/dL 2 6*   TOTAL BILIRUBIN mg/dL 0 59     Results from last 7 days   Lab Units 03/02/23  1001 03/02/23  0600 03/01/23  2220 03/01/23  1637 03/01/23  1401 03/01/23  1341 03/01/23  1113 03/01/23  0914 02/28/23  1527   POC GLUCOSE mg/dl 323* 321* 314* 227* 227* 269* 341* 347* 424*     Results from last 7 days   Lab Units 03/02/23  0437 03/01/23  0415 02/28/23  1639   GLUCOSE RANDOM mg/dL 346* 403* 465*         Results from last 7 days   Lab Units 02/28/23  1639   HEMOGLOBIN A1C % 10 3*   EAG mg/dl 249     BETA-HYDROXYBUTYRATE   Date Value Ref Range Status   02/28/2023 1 4 (H) <0 6 mmol/L Final   02/09/2023 0 2 <0 6 mmol/L Final   03/05/2020 0 1 <0 6 mmol/L Final          Results from last 7 days   Lab Units 02/28/23  1639   PH PAIGE  7 461*   PCO2 PAIGE mm Hg 41 9*   PO2 PAIGE mm Hg 33 8*   HCO3 PAIGE mmol/L 29 2   BASE EXC PAIGE mmol/L 4 9   O2 CONTENT PAIGE ml/dL 11 6   O2 HGB, VENOUS % 66 4     Results from last 7 days   Lab Units 02/28/23  1639   NT-PRO BNP pg/mL 95     Results from last 7 days   Lab Units 02/28/23  1639   FERRITIN ng/mL 50     Results from last 7 days   Lab Units 02/28/23 2010   CLARITY UA  Clear   COLOR UA  Yellow   SPEC GRAV UA  >=1 030   PH UA  6 0   GLUCOSE UA mg/dl 500 (1/2%)*   KETONES UA mg/dl 15 (1+)*   BLOOD UA  Small*   PROTEIN UA mg/dl >=300*   NITRITE UA  Negative   BILIRUBIN UA  Negative   UROBILINOGEN UA E U /dl 0 2   LEUKOCYTES UA  Negative   WBC UA /hpf Innumerable*   RBC UA /hpf Innumerable*   BACTERIA UA /hpf Occasional   EPITHELIAL CELLS WET PREP /hpf None Seen     Results from last 7 days   Lab Units 02/28/23  1817   INFLUENZA A PCR  Negative   INFLUENZA B PCR  Negative   RSV PCR  Negative     Results from last 7 days   Lab Units 02/28/23 2010   URINE CULTURE  Culture too young- will reincubate     ED Treatment:   Medication Administration from 02/28/2023 1513 to 03/01/2023 0749       Date/Time Order Dose Route Action     02/28/2023 1636 EST sodium chloride 0 9 % bolus 1,000 mL 1,000 mL Intravenous New Bag     02/28/2023 1900 EST sodium chloride 0 9 % bolus 1,000 mL 1,000 mL Intravenous New Bag     02/28/2023 2310 EST metoprolol tartrate (LOPRESSOR) tablet 50 mg 50 mg Oral Given     03/01/2023 0617 EST labetalol (NORMODYNE) injection 10 mg 10 mg Intravenous Given        Past Medical History:   Diagnosis Date   • Anemia    • Arthritis    • Benign hypertension     Procedure/Onset: 01/01/2005; Description: BP elevated today  Pt reports running out of BP meds 2wks ago  Will resume BP meds     • Diabetes mellitus ([place] [address] )    • Diabetes mellitus type 2 with complications, uncontrolled 9/8/2015   • Dyspnea on exertion    • Hyperlipidemia    • Hypertension    • Legally blind 2010   • Mild intermittent asthma with exacerbation 8/4/2018   • Miscarriage     x 3   • Polymyalgia rheumatica ([place] [address] ) 11/24/2021   • Seizures ([place] [address] )    • Sickle cell trait (HCC)    • Sleep apnea    • Stage 3a chronic kidney disease ([place] [address] ) 5/19/2022   • Thyroid nodule 3/6/2020     Present on Admission:  • Anemia  • Diabetes mellitus ([place] [address] )  • Hyperlipidemia  • Stage 3a chronic kidney disease (HCC)  • Polymyalgia rheumatica (HCC)  • Mild intermittent asthma without complication  • Essential hypertension  • Weakness  • Prolonged QT interval  • Bilateral lower extremity edema  • Cough  • Abnormal urinalysis  • Cerebral aneurysm without rupture  • Obstructive sleep apnea  • Aortic dilatation (HCC)      Admitting Diagnosis: Homelessness [Z59 00]  Generalized weakness [R53 1]  Insulin dependent type 2 diabetes mellitus (HCC) [E11 9, Z79 4]  Ambulatory dysfunction [R26 2]  Known medical problems [Z78 9]  Age/Sex: 57 y o  female  Admission Orders:  Scheduled Medications:  aspirin, 81 mg, Oral, Daily  atorvastatin, 40 mg, Oral, Daily  budesonide-formoterol, 1 puff, Inhalation, BID  enoxaparin, 60 mg, Subcutaneous, Daily  ferrous sulfate, 325 mg, Oral, BID With Meals  insulin glargine, 20 Units, Subcutaneous, HS  insulin lispro, 4-20 Units, Subcutaneous, 4 times day  losartan, 100 mg, Oral, Daily  metFORMIN, 1,000 mg, Oral, BID With Meals  metoprolol tartrate, 50 mg, Oral, Q12H RONI  predniSONE, 4 mg, Oral, Daily      Continuous IV Infusions: none     PRN Meds:  labetalol, 10 mg, Intravenous, Q6H PRN x1 thus far  polyethylene glycol, 17 g, Oral, BID PRN    scd    IP CONSULT TO CASE MANAGEMENT    Network Utilization Review Department  ATTENTION: Please call with any questions or concerns to [phone] and carefully listen to the prompts so that you are directed to the right person  All voicemails are confidential   Lakesha Findtam all requests for admission clinical reviews, approved or denied determinations and any other requests to dedicated fax number below belonging to the campus where the patient is receiving treatment   List of dedicated fax numbers for the Facilities:  [address] DENIALS (Administrative/Medical Necessity) [phone]   [address] (Maternity/NICU/Pediatrics) [phone]   911 Lisa Moran [phone]   [facility] 77 [phone]   [id number] [place] [address] 28 [phone]   [address] [facility] [place] [address] 134 815 [facility] [phone]

## 2023-03-01 NOTE — ASSESSMENT & PLAN NOTE
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  - Average BG last 72 hrs: (P) 308 75     Plan:  - Hold home insulin for now, consider resume on discharge  - Start ISS for tighter BG control as patient resumed on home prednisone  - Started lantus 10qhs on 3/1, will likely need increase  - CHO ctrl diet  - Recommend close follow-up with endocrinologist outpatient    Results from last 7 days   Lab Units 03/02/23  0600 03/01/23  2220 03/01/23  1637 03/01/23  1401 03/01/23  1341 03/01/23  1113 03/01/23  0914   POC GLUCOSE mg/dl 321* 314* 227* 227* 269* 341* 347*

## 2023-03-01 NOTE — ASSESSMENT & PLAN NOTE
Patient reports acute nonproductive cough started several weeks ago a/w exertional dyspnea  - 9/20/19 ECHO: LVEF 60%, no regional wall motion abnormalities, mild to moderate concentric hypertrophy, G1DD  - 2/14/23 LE doppler: No evidence of acute or chronic DVT  - 2/17/23 CXR: Cardiomegaly with increased bilateral opacities likely representing CHF  - 2/28/23 COVID/flu/RSV negative  - Initial exam: +Bibasilar rales  Coarse lung sounds  No wheezing or crackles noted  Assessment: Acute cough could be secondary to viral respiratory infection    However cannot r/o CHF    Plan:  - Repeat ECHO  - Gentle diuresis as renally tolerated  - Monitor I&O

## 2023-03-01 NOTE — ASSESSMENT & PLAN NOTE
Patient reports both her and her son have been homelessness since Feb 2023 after eviction from her apartment    - CM consulted for social support

## 2023-03-01 NOTE — ASSESSMENT & PLAN NOTE
H/O HLD  - Patient reports has not been taking any of her medication for 1-2 weeks due to not poor diet and not feeling well  - Home regimen: Atorvastatin 40mg QD  - 5/7/21 lipid panel: Chol 164, , HDL 45, LDL 96    Assessment: ASCVD 23 5%    Plan:  - Continue home meds  - Will recheck fasting lipid panel    Lab Results   Component Value Date    CHOLESTEROL 157 03/01/2023    TRIG 117 03/01/2023    HDL 31 (L) 03/01/2023    LDLCALC 103 (H) 03/01/2023

## 2023-03-01 NOTE — ASSESSMENT & PLAN NOTE
H/O MOHAN, however has not been using her CPAP due to being homeless  -Follows sleep medicine outpatient, last visit was 722    Plan:  - Continue CPAP nightly

## 2023-03-01 NOTE — ASSESSMENT & PLAN NOTE
Patient denies any headache, dizziness, nausea, vomiting, or any focal neurological symptoms  - 3/7/22 CTA head: no acute intracranial disease, volume loss greater than expected for age  Two, 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  - 2/8/23 CT head: no acute intracranial abnormality   - Follows outpatient neurosurgery, last visit  3/16/22    Plan:  - Monitor clinically  - Low threshold to consult neurosurgery if patient develops severe headache or new neurological symptoms

## 2023-03-01 NOTE — RAPID RESPONSE
Rapid Response Note  Tex Padilla 57 y o  female MRN: [id number]  Unit/Bed#: -01 Encounter: [id number]    Rapid Response Notification(s):   Response called date/time:  3/1/2023 2:01 PM  Response team arrival date/time:  3/1/2023 2:02 PM  Response end date/time:  3/1/2023 2:08 PM  Level of care:  [facility]  Rapid response location:  [facility] unit (Rm 760)  Primary reason for rapid response call: Fall    Rapid Response Intervention(s):   Airway:  None  Breathing:  None  Circulation:  None  Fluids administered:  None  Medications administered:  None       Assessment:   · Fall    Plan:   · No CTH or CT-C spine as patient slid from bed onto floor and assisted by nursing staff  No head-strike  · Assist back to bed with hoetelvina mecca  · Trial pure-wick in place of ambulating to commode  · Recommend PT/OT consult  · Fall precautions     Rapid Response Outcome:   Transfer:  Remain on floor  Primary service notified of transfer: Yes    Code Status: Level 1 (Full Code)      Family notified of transfer: yes  Family member contacted: Son at bedside     Background/Situation:   Tex Padilla is a 57 y o  female who initially presented last night with weakness and frequent falls at home  Today she is a rapid response activation due to fall  She was getting OOB to use the bathroom when she sat on the side of the bed (assisted by nursing staff) and slid from the side of the bed to the floor  She is AAO x 3, denies any pain or head-strike  BG normal, VSS  Assisted back to bed using hover mecca  Review of Systems   Respiratory: Negative for cough and shortness of breath  Cardiovascular: Negative for chest pain and palpitations  Gastrointestinal: Negative for abdominal distention, diarrhea, nausea and vomiting  Musculoskeletal: Negative for arthralgias, back pain, neck pain and neck stiffness  Neurological: Positive for weakness  Negative for dizziness, syncope, light-headedness, numbness and headaches     All other systems reviewed and are negative  Objective:   Vitals:    03/01/23 1400 03/01/23 1403 03/01/23 1406 03/01/23 1406   BP: 159/81 159/81 158/82 158/82   BP Location:       Pulse: 91 88 87 86   Resp:       Temp: 98 1 °F (36 7 °C)      TempSrc:       SpO2: 95% 95% 95% 95%   Weight:       Height:         Physical Exam  Vitals reviewed  Constitutional:       General: She is not in acute distress  Appearance: She is not ill-appearing, toxic-appearing or diaphoretic  HENT:      Head: Normocephalic and atraumatic  Nose: Nose normal       Mouth/Throat:      Mouth: Mucous membranes are moist    Eyes:      Extraocular Movements: Extraocular movements intact  Pupils: Pupils are equal, round, and reactive to light  Cardiovascular:      Rate and Rhythm: Normal rate and regular rhythm  Heart sounds: No murmur heard  No friction rub  No gallop  Pulmonary:      Breath sounds: No wheezing, rhonchi or rales  Abdominal:      General: There is no distension  Tenderness: There is no abdominal tenderness  There is no guarding or rebound  Musculoskeletal:      Right lower leg: No edema  Left lower leg: No edema  Skin:     General: Skin is warm and dry  Capillary Refill: Capillary refill takes less than 2 seconds  Comments: Scabbed abrasion to L knee   Neurological:      General: No focal deficit present  Mental Status: She is alert and oriented to person, place, and time  Mental status is at baseline  Cranial Nerves: No cranial nerve deficit  Sensory: No sensory deficit  Motor: No weakness  Psychiatric:         Mood and Affect: Mood normal          Behavior: Behavior normal          Portions of the record may have been created with voice recognition software  Occasional wrong word or "sound a like" substitutions may have occurred due to the inherent limitations of voice recognition software    Read the chart carefully and recognize, using context, where substitutions have occurred      Sherman Hunt PA-C

## 2023-03-01 NOTE — H&P
H&P - [address] [facility] 1960, 57 y o  female  MRN: [id number]    Unit/Bed#: ED 18 Encounter: [id number]  Primary Care Provider: Mila Gracia MD      Admission Date: 2/28/2023 1513  Admitting Provider: Americo Rodriguez  Code Status:  Level 1 - Full Code  Diet: Diet Adria/CHO Controlled; Consistent Carbohydrate Diet Level 2 (5 carb servings/75 grams CHO/meal)  Consult: IP CONSULT TO CASE MANAGEMENT      ASSESSMENT & PLAN:   Discussed with [facility] team and finalization is pending attending physician attestation  * Weakness  Assessment & Plan  Patient reports progressively worsening generalized weakness started several month ago and was recently evicted from her apartment early Feb 2023 after sustaining a fall at home  Patient states she has difficulty ambulating, currently wheelchair dependent, and also has difficulty with standing up and sitting down from the toilet  -Initial exam: 3/5 strength bilateral LEs and 4/5 strength bilateral UEs  No focal deficits  CN II-XII grossly intact  Assessment: Generalized weakness likely multifactorial in setting of nutrition, chronic diseases, and comorbidities      Plan:  - Fall precaution  - PT/OT consulted for rehab recommendation    Diabetes mellitus ([facility] [address] )  Assessment & Plan  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  - Average BG last 72 hrs: (P) 424     Plan:  - Recheck HgbA1c  - Hold home insulin for now, consider resume on discharge  - Start ISS for tighter BG control as patient resumed on home prednisone  - CHO ctrl diet  - Recommend close follow-up with endocrinologist outpatient    Results from last 7 days   Lab Units 02/28/23  1527   POC GLUCOSE mg/dl 424*       Essential hypertension  Assessment & Plan  Initial /79  - Patient reports has not been taking any of her medication for 1-2 weeks due to not poor diet and not feeling well  - Home regimen: Losartan 100 mg daily, metoprolol tartrate 50 mg twice daily    Assessment:  - Poorly controlled BP on admission, likely due to not taking her medications  - Goal BP < 140/90  - Most recent Blood Pressure: 606/95   - Systolic (29RJR), OXW:850 , Min:131 , TXS:590   - Diastolic (71RFD), NCX:05, Min:63, Max:86    Plan:  - Resume home meds  - Labetalol 10 mg Q6H PRN for SBP > 180  - Vitals per unit routine    Prolonged QT interval  Assessment & Plan  QTc 507 on 2/28/23 EKG    Aortic dilatation (HCC)  Assessment & Plan  H/O Stable ascending thoracic aortic aneurysm  - Patient denies any chest pain on admission  - 2/18/22 CT chest: stable ectasia of the ascending thoracic aorta measuring 4 2 cm  - Follows cardiology outpatient, last visit 4/27/22    Plan:  - Monitor clinically  - Low threshold for repeat CT chest w/ contrast if patient develops acute onset chest pain    Stage 3a chronic kidney disease ([facility] [address] )  Assessment & Plan  Initial sCr 1 16  - Baseline sCr 1 0-1 1    Assessment: stable CKD-3    Plan:  - Monitor renal function  - Avoid nephrotoxic drugs    Results from last 7 days   Lab Units 02/28/23  1639   BUN mg/dL 10   CREATININE mg/dL 1 16   EGFR ml/min/1 73sq m 49       Anemia  Assessment & Plan  Initial Hgb 11 3  - H/O chronic anemia not on home FeSO4 supplement  - 2/28/23 iron panel: Fe 40, FeSat 15%, TIBC 266, Ferritin 50    Assessment: Chronic anemia likely secondary to chronic disease vs CKD vs iron deficiency   Low suspicion for acute blood loss    Plan:  - Monitor Hgb  - Start Fe supplement  - Miralax PRN for iron-induced constipation  - Will check B12, folate    Results from last 7 days Lab Units 02/28/23  1639   HEMOGLOBIN g/dL 11 3*   HEMATOCRIT % 37 1       Cough  Assessment & Plan  Patient reports acute nonproductive cough started several weeks ago a/w exertional dyspnea  Also noted chronic bilateral lower extremity edema started several years ago  - 9/20/19 ECHO: LVEF 60%, no regional wall motion abnormalities, mild to moderate concentric hypertrophy, G1DD  - 2/14/23 LE doppler: No evidence of acute or chronic DVT  - 2/17/23 CXR: Cardiomegaly with increased bilateral opacities likely representing CHF  - 2/28/23 COVID/flu/RSV negative  - Follows outpatient cardiology, last visit 4/27/22  - Initial exam: +Bibasilar rales  Coarse lung sounds  No wheezing or crackles noted  Bilateral 3+ pitting edema with venous stasis dermatitis and very dry skin  Assessment: Acute cough could be secondary to viral respiratory infection  However cannot r/o fluid overloaded state in setting of bilateral LE edema in bibasilar rales   DDx includes hypoalbuminemia vs venous insufficiency vs CHF    Plan:  - CXR ordered  - Will check BNP and repeat ECHO to r/o CHF  - Gentle diuresis as renally tolerated  - Monitor I&O    Bilateral lower extremity edema  Assessment & Plan  Please see detailed A&P under "Acute cough"      Polymyalgia rheumatica ([facility] [address] )  Assessment & Plan  H/O polymyalgia rheumatica  - Patient reports has not been taking any of her medication for 1-2 weeks due to not poor diet and not feeling well  - Home regimen: Prednisone 4mg daily (became immoble on 2mg dose due to severe aches/pains)  - Follows outpatient neurology (last visit 6/9/22) and rheumatology (last visit 2/8/22)    Plan:  - Resume home medication  - Monitor BG    Obstructive sleep apnea  Assessment & Plan  H/O MOHAN, however has not been using her CPAP due to being homeless  -Follows sleep medicine outpatient, last visit was 722    Plan:  - Continue CPAP nightly    Mild intermittent asthma without complication  Assessment & Plan  Continue home Symbicort for mild intermittent asthma  - Denies any chest tightness  - No active wheezing on exam    Abnormal urinalysis  Assessment & Plan  H/O abnormal UA with negative urine culture in the past  - Patient denies any abdominal pain or dysuria  - 2/28/23 UA: >300 protein, 500 glucose, 1+ ketone, small blood  - 2/28/23 Urine micro: innumerable RBC and WBC  - 2/28/23 UCX in process     Plan:  - Monitor clinically off ABX   - Follow-up UCX and initiate ABX if culture positive    Cerebral aneurysm without rupture  Assessment & Plan  Patient denies any headache, dizziness, nausea, vomiting, or any focal neurological symptoms  - 3/7/22 CTA head: no acute intracranial disease, volume loss greater than expected for age  Two, 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  - 2/8/23 CT head: no acute intracranial abnormality   - Follows outpatient neurosurgery, last visit  3/16/22    Plan:  - Monitor clinically  - Low threshold to consult neurosurgery if patient develops severe headache or new neurological symptoms    Homelessness  Assessment & Plan  Patient reports both her and her son have been homelessness since Feb 2023 after eviction from her apartment  - CM consulted for social support    Hyperlipidemia  Assessment & Plan  H/O HLD  - Patient reports has not been taking any of her medication for 1-2 weeks due to not poor diet and not feeling well  - Home regimen: Atorvastatin 40mg QD  - 5/7/21 lipid panel: Chol 164, , HDL 45, LDL 96    Assessment: ASCVD 30 2%    Plan:  - Continue home meds  - Will recheck fasting lipid panel    Lab Results   Component Value Date    CHOLESTEROL 164 05/07/2021    TRIG 115 05/07/2021    HDL 45 05/07/2021    LDLCALC 96 05/07/2021       Disposition: Admit to Observation under Med-surg for hyperglycemia and generalized weakness        VTE Pharm PPX: Enoxaparin (Lovenox)  VTE Mech PPX: sequential compression device and/or foot pump applied unless contraindicated otherwise      Patient Active Problem List   Diagnosis   • 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, with long-term current use of insulin   • Seizures (HCC)   • Exertional dyspnea   • Enlarged pulmonary artery (HCC)   • Aortic dilatation (HCC)   • Anemia   • Decreased pulses in feet   • Hyperlipidemia   • Microalbuminuria   • Obstructive sleep apnea (adult) (pediatric)   • Class 3 severe obesity with serious comorbidity and body mass index (BMI) of 50 0 to 59 9 in adult [facility])   • Neuropathy of hand, right   • Prolonged QT interval   • Visual impairment in both eyes   • Vitamin D deficiency   • Lung nodules   • Orthostatic hypotension   • Mild intermittent asthma without complication   • Type 2 diabetes mellitus with microalbuminuria, with long-term current use of insulin (HCC)   • Frequent headaches   • Other inflammatory and immune myopathies, not elsewhere classified   • Transaminitis   • Morbid obesity ([facility])   • Polyneuropathy associated with underlying disease ([facility] [address] )   • PMR (polymyalgia rheumatica) ([facility])   • Thrombocytosis   • Left cavernous carotid aneurysm   • Type 2 diabetes mellitus with hyperglycemia, with long-term current use of insulin ([facility])   • Aneurysm ([facility])   • Thyroid nodule   • History of absence seizures   • Hypersomnia   • Migraine without aura and without status migrainosus, not intractable   • Cerebral aneurysm without rupture   • Chronic migraine without aura without status migrainosus, not intractable   • Diabetes mellitus ([address] )   • Essential hypertension   • Depressed mood   • Blurry vision, bilateral   • Ulnar neuropathy of right upper extremity   • Polymyalgia rheumatica ([facility])   • Gait instability   • Stage 3a chronic kidney disease ([facility])   • Chronic migraine without aura, not intractable, without status migrainosus   • Obstructive sleep apnea   • Diabetic neuropathy ([facility])   • Unsteadiness on feet   • Sepsis ([address] )   • Abnormal urinalysis   • Weakness   • Intertrigo   • Leg numbness   • Homelessness   • Bilateral lower extremity edema   • Cough         Advance Directive and Living Will:      Power of :    POLST:    Emergency contact:    Primary Emergency Contact: SYBIL [address], Home Phone: [phone]   Extended Emergency Contact Information  Primary Emergency Contact: [address] [facility] [address] Phone: [phone]  Mobile Phone: [phone]  Relation: Son  Secondary Emergency Contact: Arnold Moran  Mobile Phone: [phone]  Relation: Friend      History of Present Illness      History of Presenting Illness (HPI):     Chief complaint:  Medical Problem (Pt presents to the ED by EMS  [place] and since feeling super tired, decreased appetite  Hx DM EMS reports   )      HPI:  57 y o  female admitted for hyperglycemia and generalized weakness  Complicated pmhx of homelessness, insulin-dependent T2DM with retinopathy causing legal blindness bilaterally, polymyalgia rheumatica on maintenance prednisone, absence seizure, cerebral artery aneurysm, migraine w/o aura, MOHAN, asthma, thoracic aortic aneurysm, HTN, HLD, CKD-3 with microalbuminuria, and morbid obesity  Patient [facility] ED by EMS on 2/28/2023 for c/o fatigue and decreased appetite associated with poor diet, poor sleep, and inability to take care of herself  Per chart check, patient has been evaluated by ED 6 times with 1 admission over the last 2-month for similar concerns  Patient reports progressively worsening generalized weakness started several month ago and was recently evicted from her apartment early Feb 2023 after sustaining a fall at home  Patient states she has been homeless since and has been living in warm shelter which was unable to accommodate her medical conditions   Patient states she has difficulty ambulating, currently wheelchair dependent, and also has difficulty with standing up and sitting down from the toilet  Patient also reports chronic bilateral lower extremity edema started several years ago but noted acute nonproductive cough  a/w exertional dyspnea started several weeks ago  Patient reports has not been taking any of her medication for 1-2 weeks due to not poor diet and not feeling well  Otherwise patient denies any current headache, dizziness, nausea/vomiting, chest pain, shortness of breath, abdominal pain, diarrhea, constipation, dysuria, or focal weakness        ED Course:  Vitals: /79, , RR 20, SaO2 98% ORA  Labs:  CBC: Hgb 11 3, MCV 77,   CMP: Na 133, sCr 1 16, , Ca 10 6, , Alb 2 6  Beta hydroxybutyrate 1 4  VB 46 / 41 9 /  2  Flu/COVID/RSV negative  UA: >300 protein, 500 glucose, 1+ ketone, small blood  Urine micro: Innumerable RBC and WBC  UCX in process  Imaging:  EKG: Sinus tach with occasional PVC's  Treatment: 2L NS bolus      ED Triage Vitals [23 1523]   Temperature Pulse Respirations Blood Pressure SpO2   98 °F (36 7 °C) (!) 109 20 164/79 98 %      Temp Source Heart Rate Source Patient Position - Orthostatic VS BP Location FiO2 (%)   Oral Monitor Lying Right arm --      Pain Score       --         Medications   aspirin (ECOTRIN LOW STRENGTH) EC tablet 81 mg (has no administration in time range)   atorvastatin (LIPITOR) tablet 40 mg (has no administration in time range)   budesonide-formoterol (SYMBICORT) 160-4 5 mcg/act inhaler 1 puff (has no administration in time range)   losartan (COZAAR) tablet 100 mg (has no administration in time range)   metFORMIN (GLUCOPHAGE-XR) 24 hr tablet 1,000 mg (has no administration in time range)   metoprolol tartrate (LOPRESSOR) tablet 50 mg (50 mg Oral Given 23 2310)   enoxaparin (LOVENOX) subcutaneous injection 40 mg (has no administration in time range)   labetalol (NORMODYNE) injection 10 mg (has no administration in time range)   ferrous sulfate tablet 325 mg (has no administration in time range)   polyethylene glycol (MIRALAX) packet 17 g (has no administration in time range)   predniSONE tablet 4 mg (has no administration in time range)   insulin lispro (HumaLOG) 100 units/mL subcutaneous injection 4-20 Units (has no administration in time range)   sodium chloride 0 9 % bolus 1,000 mL (0 mL Intravenous Stopped 2/28/23 1836)   sodium chloride 0 9 % bolus 1,000 mL (1,000 mL Intravenous New Bag 2/28/23 1900)       Subjective     Review of Systems (ROS):     Review of Systems   Constitutional: Positive for appetite change and fatigue  Negative for chills and fever  HENT: Negative for trouble swallowing  Eyes: Negative for pain and redness  Respiratory: Positive for cough and shortness of breath  Cardiovascular: Positive for leg swelling  Negative for chest pain  Gastrointestinal: Negative for abdominal pain  Endocrine: Negative for cold intolerance and heat intolerance  Genitourinary: Negative for dysuria and hematuria  Musculoskeletal: Negative for arthralgias and myalgias  Skin: Negative for wound  Neurological: Negative for seizures and syncope  Psychiatric/Behavioral: Negative for behavioral problems  Historical Information     Past medical / surgical history:     Past Medical History:   Diagnosis Date   • Anemia    • Arthritis    • Benign hypertension     Procedure/Onset: 01/01/2005; Description: BP elevated today  Pt reports running out of BP meds 2wks ago  Will resume BP meds     • Diabetes mellitus ([facility] )    • Diabetes mellitus type 2 with complications, uncontrolled 9/8/2015   • Dyspnea on exertion    • Hyperlipidemia    • Hypertension    • Legally blind 2010   • Mild intermittent asthma with exacerbation 8/4/2018   • Miscarriage     x 3   • Polymyalgia rheumatica ([facility] ) 11/24/2021   • Seizures ([facility] )    • Sickle cell trait ([facility] )    • Sleep apnea    • Stage 3a chronic kidney disease ([facility] ) 5/19/2022   • Thyroid nodule 3/6/2020     Past Surgical History:   Procedure Laterality Date   • CATARACT EXTRACTION Bilateral     august and september   • EYE SURGERY     • HYSTERECTOMY      37   • OOPHORECTOMY Left     37   • CO LIGATION/BIOPSY TEMPORAL ARTERY Bilateral 10/17/2019    Procedure: BIOPSY ARTERY TEMPORAL;  Surgeon: Romie Howard DO;  Location: [facility];  Service: Vascular   • US GUIDED THYROID BIOPSY  5/13/2020           Social History     Social history:     Social History     Substance and Sexual Activity   Alcohol Use Never   • Alcohol/week: 0 0 standard drinks     Social History     Substance and Sexual Activity   Drug Use No     Social History     Tobacco Use   Smoking Status Never   Smokeless Tobacco Never     Family History   Problem Relation Age of Onset   • Heart attack Mother    • Heart disease Mother    • Hypertension Mother    • No Known Problems Father    • Heart disease Sister    • No Known Problems Brother    • No Known Problems Son    • No Known Problems Daughter    • Heart attack Maternal Grandmother    • Heart disease Maternal Grandmother    • Diabetes Other    • Heart attack Other    • No Known Problems Sister    • No Known Problems Sister    • No Known Problems Paternal Aunt    • No Known Problems Son    • Cancer Neg Hx    • Stroke Neg Hx        Meds/Allergies     Medications & Allergies:   all medications and allergies reviewed  No Known Allergies    PTA Medications:  No current facility-administered medications on file prior to encounter       Current Outpatient Medications on File Prior to Encounter   Medication Sig Dispense Refill   • albuterol (Proventil HFA) 90 mcg/act inhaler Inhale 2 puffs every 6 (six) hours as needed for wheezing for up to 14 days For another 24 hours use every 4 hours standing 6 7 g 0   • aspirin (ECOTRIN LOW STRENGTH) 81 mg EC tablet Take 1 tablet (81 mg total) by mouth daily for 14 days 14 tablet 0   • atorvastatin (LIPITOR) 40 mg tablet Take 1 tablet (40 mg total) by mouth daily for 14 days 14 tablet 0   • Blood Glucose Monitoring Suppl (OneTouch Verio) w/Device KIT Use 3 (three) times a day for 14 days 1 kit 0   • Blood Pressure Monitor KIT Check blood pressures BID 1 kit 0   • budesonide-formoterol (Symbicort) 160-4 5 mcg/act inhaler Inhale 1 puff 2 (two) times a day for 14 days Rinse mouth after use   10 2 g 0   • Cholecalciferol (HM Vitamin D3) 100 MCG (4000 UT) CAPS 4000 units daily 60 capsule 0   • Continuous Blood Gluc  (FreeStyle Denisa 2 [place]) ERIC Use 1 each 4 (four) times a day 1 each 0   • Continuous Blood Gluc Sensor (FreeStyle Denisa 2 Sensor) MISC Use 1 each every 14 (fourteen) days 2 each 0   • diltiazem (CARDIZEM CD) 240 mg 24 hr capsule Take 2 capsules (480 mg total) by mouth daily for 14 days 180 capsule 0   • furosemide (LASIX) 20 mg tablet Take 1 tablet (20 mg total) by mouth daily for 14 days 14 tablet 0   • Insulin Regular Human, Conc, (HumuLIN R U-500 KwikPen) 500 units/mL CONCENTRATED U-500 injection pen INJECT SUBCUTANEOUSLY 80   UNITS BEFORE BREAKFAST AND  20 UNITS BEFORE DINNER 36 mL 0   • Lancets (OneTouch Delica Plus [id number]) MISC Use 1 each 3 (three) times a day 300 each 0   • losartan (COZAAR) 100 MG tablet Take 1 tablet (100 mg total) by mouth daily 90 tablet 0   • metFORMIN (GLUCOPHAGE-XR) 500 mg 24 hr tablet Take 2 tablets (1,000 mg total) by mouth 2 (two) times a day with meals 360 tablet 0   • metoprolol tartrate (LOPRESSOR) 50 mg tablet Take 1 tablet (50 mg total) by mouth every 12 (twelve) hours 60 tablet 0       Objective     Vitals:     Patient Vitals for the past 24 hrs:   BP Temp Temp src Pulse Resp SpO2   03/01/23 0400 142/71 -- -- 86 18 91 %   03/01/23 0115 131/63 -- -- 87 18 92 %   03/01/23 0015 (!) 180/77 -- -- 96 18 95 %   02/28/23 2315 (!) 182/86 -- -- (!) 108 19 95 %   02/28/23 2310 (!) 182/86 -- -- (!) 110 -- --   02/28/23 1523 164/79 98 °F (36 7 °C) Oral (!) 109 20 98 %       No intake or output data in the 24 hours ending 03/01/23 1295    Invasive Devices     Peripheral Intravenous Line  Duration           Peripheral IV 02/28/23 Left Antecubital <1 day                Labs: I have personally reviewed pertinent reports      Recent Results (from the past 24 hour(s))   Fingerstick Glucose (POCT)    Collection Time: 02/28/23  3:27 PM   Result Value Ref Range    POC Glucose 424 (H) 65 - 140 mg/dl   ECG 12 lead    Collection Time: 02/28/23  4:28 PM   Result Value Ref Range    Ventricular Rate 106 BPM    Atrial Rate 121 BPM    SD Interval 160 ms    QRSD Interval 86 ms    QT Interval 382 ms    QTC Interval 507 ms    P Axis 61 degrees    QRS Axis 29 degrees    T Wave Axis 43 degrees   CBC and differential    Collection Time: 02/28/23  4:39 PM   Result Value Ref Range    WBC 9 73 4 31 - 10 16 Thousand/uL    RBC 4 80 3 81 - 5 12 Million/uL    Hemoglobin 11 3 (L) 11 5 - 15 4 g/dL    Hematocrit 37 1 34 8 - 46 1 %    MCV 77 (L) 82 - 98 fL    MCH 23 5 (L) 26 8 - 34 3 pg    MCHC 30 5 (L) 31 4 - 37 4 g/dL    RDW 16 8 (H) 11 6 - 15 1 %    MPV 10 3 8 9 - 12 7 fL    Platelets 247 (H) 336 - 390 Thousands/uL    nRBC 0 /100 WBCs    Neutrophils Relative 69 43 - 75 %    Immat GRANS % 1 0 - 2 %    Lymphocytes Relative 19 14 - 44 %    Monocytes Relative 7 4 - 12 %    Eosinophils Relative 3 0 - 6 %    Basophils Relative 1 0 - 1 %    Neutrophils Absolute 6 89 1 85 - 7 62 Thousands/µL    Immature Grans Absolute 0 05 0 00 - 0 20 Thousand/uL    Lymphocytes Absolute 1 84 0 60 - 4 47 Thousands/µL    Monocytes Absolute 0 63 0 17 - 1 22 Thousand/µL    Eosinophils Absolute 0 25 0 00 - 0 61 Thousand/µL    Basophils Absolute 0 07 0 00 - 0 10 Thousands/µL   Comprehensive metabolic panel    Collection Time: 02/28/23  4:39 PM   Result Value Ref Range    Sodium 133 (L) 135 - 147 mmol/L    Potassium 3 6 3 5 - 5 3 mmol/L    Chloride 99 96 - 108 mmol/L    CO2 29 21 - 32 mmol/L    ANION GAP 5 4 - 13 mmol/L    BUN 10 5 - 25 mg/dL    Creatinine 1 16 0 60 - 1 30 mg/dL    Glucose 465 (H) 65 - 140 mg/dL    Calcium 9 5 8 3 - 10 1 mg/dL    Corrected Calcium 10 6 (H) 8 3 - 10 1 mg/dL    AST 18 5 - 45 U/L    ALT 16 12 - 78 U/L    Alkaline Phosphatase 165 (H) 46 - 116 U/L    Total Protein 7 5 6 4 - 8 4 g/dL    Albumin 2 6 (L) 3 5 - 5 0 g/dL    Total Bilirubin 0 59 0 20 - 1 00 mg/dL    eGFR 49 ml/min/1 73sq m   Beta Hydroxybutyrate    Collection Time: 02/28/23  4:39 PM   Result Value Ref Range    BETA-HYDROXYBUTYRATE 1 4 (H) <0 6 mmol/L   Blood gas, venous    Collection Time: 02/28/23  4:39 PM   Result Value Ref Range    pH, [place] 7 461 (H) 7 300 - 7 400    pCO2, [place] 41 9 (L) 42 0 - 50 0 mm Hg    pO2, [place] 33 8 (L) 35 0 - 45 0 mm Hg    HCO3, [place] 29 2 24 - 30 mmol/L    Base Excess, [place] 4 9 mmol/L    O2 Content, [place] 11 6 ml/dL    O2 HGB, VENOUS 66 4 60 0 - 80 0 %   Iron Saturation %    Collection Time: 02/28/23  4:39 PM   Result Value Ref Range    Iron Saturation 15 15 - 50 %    TIBC 266 250 - 450 ug/dL    Iron 40 (L) 50 - 170 ug/dL   Ferritin    Collection Time: 02/28/23  4:39 PM   Result Value Ref Range    Ferritin 50 8 - 388 ng/mL   Hemoglobin A1C    Collection Time: 02/28/23  4:39 PM   Result Value Ref Range    Hemoglobin A1C 10 3 (H) Normal 3 8-5 6%; PreDiabetic 5 7-6 4%;  Diabetic >=6 5%; Glycemic control for adults with diabetes <7 0% %     mg/dl   FLU/RSV/COVID - if FLU/RSV clinically relevant    Collection Time: 02/28/23  6:17 PM    Specimen: Nose; Nares   Result Value Ref Range    SARS-CoV-2 Negative Negative    INFLUENZA A PCR Negative Negative    INFLUENZA B PCR Negative Negative    RSV PCR Negative Negative   Urine Macroscopic, POC    Collection Time: 02/28/23  8:10 PM   Result Value Ref Range    Color, UA Yellow     Clarity, UA Clear     pH, UA 6 0 4 5 - 8 0    Leukocytes, UA Negative Negative    Nitrite, UA Negative Negative    Protein, UA >=300 (A) Negative mg/dl    Glucose,  (1/2%) (A) Negative mg/dl    Ketones, UA 15 (1+) (A) Negative mg/dl    Urobilinogen, UA 0 2 0 2, 1 0 E U /dl E U /dl    Bilirubin, UA Negative Negative    Occult Blood, UA Small (A) Negative    Specific Gravity, UA >=1 030 1 003 - 1 030   Urine Microscopic    Collection Time: 02/28/23  8:10 PM   Result Value Ref Range    RBC, UA Innumerable (A) None Seen, 1-2 /hpf    WBC, UA Innumerable (A) None Seen, 1-2 /hpf    Epithelial Cells None Seen None Seen, Occasional /hpf    Bacteria, UA Occasional None Seen, Occasional /hpf    WBC Clumps Present     Budding Yeast Present    POCT urinalysis dipstick    Collection Time: 02/28/23 10:53 PM   Result Value Ref Range    Color, UA      Clarity, UA      EXT Leukocytes, UA      Nitrite, UA      Protein, UA  mg/dl    Glucose, UA      Ketones, UA  mg/dl    EXT Urobilinogen, UA       Bilirubin, UA      Blood, UA         Imaging:     I have personally reviewed pertinent reports  EKG, Pathology, and Other Studies:   I have personally reviewed pertinent reports  Physical Exam    Physical Exam  Vitals and nursing note reviewed  Constitutional:       General: She is not in acute distress  Appearance: Normal appearance  She is well-developed  She is obese  She is ill-appearing  She is not toxic-appearing or diaphoretic  HENT:      Head: Normocephalic and atraumatic  Right Ear: External ear normal       Left Ear: External ear normal       Nose: Nose normal       Mouth/Throat:      Mouth: Mucous membranes are moist    Eyes:      General: Scleral icterus present  Right eye: No discharge  Left eye: No discharge  Extraocular Movements: Extraocular movements intact  Cardiovascular:      Rate and Rhythm: Normal rate  Pulses: Normal pulses  Heart sounds: Murmur heard  Pulmonary:      Effort: Pulmonary effort is normal  No respiratory distress  Breath sounds: Rales (bilateral basilar rales) present  Abdominal:      General: Abdomen is flat  Bowel sounds are normal  There is no distension  Tenderness: There is no abdominal tenderness     Musculoskeletal: General: Swelling present  Normal range of motion  Cervical back: Normal range of motion and neck supple  No rigidity  Right lower leg: Edema present  Left lower leg: Edema present  Comments: Bilateral 3+ pitting edema with venous stasis dermatitis and very dry skin   Skin:     General: Skin is warm  Neurological:      General: No focal deficit present  Mental Status: She is alert and oriented to person, place, and time  Mental status is at baseline  Motor: Weakness (generalized) present  Psychiatric:         Mood and Affect: Mood normal          Behavior: Behavior normal               Counseling / Coordination of Care  Total floor / unit time spent today 30 minutes  Greater than 50% of total time was spent with the patient and / or family counseling and / or coordination of care      Hermilo Kiran MD  PGY-3, B Family Medicine  03/01/23, 4:25 AM

## 2023-03-01 NOTE — ASSESSMENT & PLAN NOTE
Continue home Symbicort for mild intermittent asthma  - Denies any chest tightness  - No active wheezing on exam

## 2023-03-01 NOTE — QUICK NOTE
Rapid response called for patient at approx 1400  Upon arriving at pt room, patient was sitting on the floor surrounded by nursing staff  Per nursing staff, PCA was assisting patient to use the restroom when she slipped out of bed and landed on her bottom  There was no headstrike  Vitals at the time of fall were stable, and blood glucose at the time of rapid was 227  Pt was not able to stand up on her own with assistance from multiple members of care team  HoverMatt was used to get patient back into bed safely   Spoke with patient after she was safely transferred back to bed; pt explains PCA was helping her stand up to use the restroom, when she felt her socks slip on the tile floor, causing her to slide off the bed and land on her buttock  She denies any headstrike or pain at the moment, but reports she suspects she "will feel it in her lower back later " Instructed patient to make nursing staff aware if she develops back pain as we can add an analgesic if needed  Pt expresses understanding

## 2023-03-01 NOTE — CASE MANAGEMENT
Case Management Assessment    Patient name uJn Berry  Location /-33 MRN [id number]  : 1960 Date 3/1/2023       Current Admission Date: 2023  Current Admission Diagnosis:Weakness   Patient Active Problem List    Diagnosis Date Noted   • Bilateral lower extremity edema 2023   • Cough 2023   • Homelessness 2023   • Sepsis ([facility] ) 2023   • Abnormal urinalysis 2023   • Weakness 2023   • Intertrigo 2023   • Leg numbness 2023   • Unsteadiness on feet 2022   • Chronic migraine without aura, not intractable, without status migrainosus 2022   • Obstructive sleep apnea 2022   • Diabetic neuropathy ([place] [address] ) 2022   • Stage 3a chronic kidney disease ([place] [address] ) 2022   • Polymyalgia rheumatica ([place] [address] ) 2021   • Gait instability 2021   • Ulnar neuropathy of right upper extremity    • Blurry vision, bilateral 2021   • Depressed mood 10/08/2020   • Essential hypertension 09/15/2020   • Diabetes mellitus ([place] [address] ) 2020   • Chronic migraine without aura without status migrainosus, not intractable 2020   • Hypersomnia 2020   • Migraine without aura and without status migrainosus, not intractable 2020   • Cerebral aneurysm without rupture 2020   • History of absence seizures 2020   • Thyroid nodule 2020   • Aneurysm ([place] [address] ) 2020   • Type 2 diabetes mellitus with hyperglycemia, with long-term current use of insulin ([place] [address] ) 2020   • Left cavernous carotid aneurysm 02/10/2020   • Polyneuropathy associated with underlying disease ([place] [address] ) 2020   • PMR (polymyalgia rheumatica) ([place] [address] ) 2020   • Thrombocytosis 2020   • Morbid obesity ([place] [address] ) 2019   • Other inflammatory and immune myopathies, not elsewhere classified 2019   • Transaminitis 2019   • Frequent headaches 2019   • Type 2 diabetes mellitus with microalbuminuria, with long-term current use of insulin ([facility] ) 02/01/2019   • Mild intermittent asthma without complication 17/94/2714   • Orthostatic hypotension 06/25/2018   • Lung nodules 03/22/2018   • Exertional dyspnea 02/13/2018   • Enlarged pulmonary artery (HCC) 02/13/2018   • Aortic dilatation (HCC) 02/13/2018   • 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, with long-term current use of insulin    • Seizures ([facility] )    • Obstructive sleep apnea (adult) (pediatric) 12/18/2017   • Prolonged QT interval 12/18/2017   • Decreased pulses in feet 12/11/2017   • Microalbuminuria 09/08/2015   • Vitamin D deficiency 06/06/2014   • Visual impairment in both eyes 12/06/2012   • Anemia 03/20/2012   • Hyperlipidemia 03/20/2012   • Class 3 severe obesity with serious comorbidity and body mass index (BMI) of 50 0 to 59 9 in adult [facility]) 03/20/2012   • Neuropathy of hand, right 03/20/2012      LOS (days): 0  Geometric Mean LOS (GMLOS) (days):   Days to GMLOS:     OBJECTIVE:  PATIENT READMITTED TO HOSPITAL  Risk of Unplanned Readmission Score: 22 88         Current admission status: Inpatient       Preferred Pharmacy:   [address] [facility], [facility] [address] [address]  Phone: [phone] Fax: [phone]    [facility] ([address] [place] [address],   [address] [place] [id number]  Phone: [phone] Fax: [phone]    Primary Care Provider: No primary care provider on file      Primary Insurance: [facility] [place] DUAL COMPLETE  REP  Secondary Insurance:     ASSESSMENT:  [address], [address] Representative - Son   Primary Phone: [phone] (Mobile)  Home Phone: [phone]               Advance Directives  Does patient have a [address]?: No  Was patient offered paperwork?:  (pt received 5 wishes last admission)  Does patient currently have a Health Care decision maker?: Yes, please see Health Care Proxy section  Does patient have Advance Directives?: No  Was patient offered paperwork?:  (pt received 5 wishes a month ago)  Primary Contact: sonIsaiah [phone]         Readmission Root Cause  30 Day Readmission: Yes  Who directed you to return to the hospital?: Self  Did you understand whom to contact if you had questions or problems?: Yes  Did you get your prescriptions before you left the hospital?: Yes  Were you able to get your prescriptions filled when you left the hospital?: Yes  Did you take your medications as prescribed?: No  Were you able to get to your follow-up appointments?: No  Reason[de-identified] Compliance  During previous admission, was a post-acute recommendation made?: Yes  What post-acute resources were offered?: [facility], Offered, but declined (pt is homeless and said the shelter would not allow [facility]  f/u)  Patient was readmitted due to: weakness, Htn  Pt reports she has not taken medications x 2 weeks  Action Plan: restart medications  Patient Information  Admitted from[de-identified] Other (comment) (homeless shelter)  Mental Status: Alert  During Assessment patient was accompanied by: Not accompanied during assessment  Assessment information provided by[de-identified] Other - please comment (pt is a readmission   Information obtained from chart)  Primary Caregiver: Self  Support Systems: Son  What city do you live in?: pt is homeless but has been staying at shelter in [address] [place] [address],[address]: Other ([facility]) ([address] shelter in [facility])    Activities of Daily Living Prior to Admission  Functional Status: Independent  Completes ADLs independently?: Yes  Ambulates independently?: No (WC bound presently-unable to ambulate)  Level of ambulatory dependence: Assistance  Does patient use assisted devices?: Yes  Assisted Devices (DME) used: Straight [employer]oke [employer], Wheelchair  Does patient have a history of Outpatient Therapy (PT/OT)?: No  Does the patient have a history of Short-Term Rehab?: No  Does patient have a history of HHC?: No  Does patient currently have [address]?: No         Patient Information Continued  Income Source: SSI/SSD  Does patient have prescription coverage?: Yes  Within the past 12 months, you worried that your food would run out before you got the money to buy more : Sometimes true  Within the past 12 months, the food you bought just didn't last and you didn't have money to get more : Sometimes true  Food insecurity resource given?: No  Does patient receive dialysis treatments?: No  Does patient have a history of substance abuse?: No  Does patient have a history of Mental Health Diagnosis?: No         Means of Transportation  Means of Transport to [employer][de-identified] [employer] - Bus  In the past 12 months, has lack of transportation kept you from medical appointments or from getting medications?: No  In the past 12 months, has lack of transportation kept you from meetings, work, or from getting things needed for daily living?: No  Was application for public transport provided?: N/A    TC to patient's room to discuss CM role, obtain baseline assessment information and discuss DCP  No answer  TC to EC/son, Jorden Hunt with now answer   LM w/ request for RTC

## 2023-03-01 NOTE — ASSESSMENT & PLAN NOTE
H/O abnormal UA with negative urine culture in the past  - Patient denies any abdominal pain or dysuria  - 2/28/23 UA: >300 protein, 500 glucose, 1+ ketone, small blood  - 2/28/23 Urine micro: innumerable RBC and WBC  - 2/28/23 UCX in process     Plan:  - Monitor clinically off ABX   - Follow-up UCX and initiate ABX if culture positive    Results from last 7 days   Lab Units 02/28/23 2010   URINE CULTURE  Culture too young- will reincubate

## 2023-03-01 NOTE — ASSESSMENT & PLAN NOTE
Initial sCr 1 16  - Baseline sCr 1 0-1 1    Assessment: stable CKD-3    Plan:  - Monitor renal function  - Avoid nephrotoxic drugs    Results from last 7 days   Lab Units 03/01/23  0415 02/28/23  1639   BUN mg/dL 11 10   CREATININE mg/dL 1 08 1 16   EGFR ml/min/1 73sq m 54 49

## 2023-03-02 LAB
ANION GAP SERPL CALCULATED.3IONS-SCNC: 5 MMOL/L (ref 4–13)
BUN SERPL-MCNC: 10 MG/DL (ref 5–25)
CALCIUM SERPL-MCNC: 8.8 MG/DL (ref 8.3–10.1)
CHLORIDE SERPL-SCNC: 105 MMOL/L (ref 96–108)
CO2 SERPL-SCNC: 29 MMOL/L (ref 21–32)
CREAT SERPL-MCNC: 1.02 MG/DL (ref 0.6–1.3)
ERYTHROCYTE [DISTWIDTH] IN BLOOD BY AUTOMATED COUNT: 16.9 % (ref 11.6–15.1)
GFR SERPL CREATININE-BSD FRML MDRD: 58 ML/MIN/1.73SQ M
GLUCOSE SERPL-MCNC: 275 MG/DL (ref 65–140)
GLUCOSE SERPL-MCNC: 278 MG/DL (ref 65–140)
GLUCOSE SERPL-MCNC: 319 MG/DL (ref 65–140)
GLUCOSE SERPL-MCNC: 321 MG/DL (ref 65–140)
GLUCOSE SERPL-MCNC: 323 MG/DL (ref 65–140)
GLUCOSE SERPL-MCNC: 346 MG/DL (ref 65–140)
HCT VFR BLD AUTO: 33.8 % (ref 34.8–46.1)
HGB BLD-MCNC: 9.7 G/DL (ref 11.5–15.4)
MCH RBC QN AUTO: 23.2 PG (ref 26.8–34.3)
MCHC RBC AUTO-ENTMCNC: 28.7 G/DL (ref 31.4–37.4)
MCV RBC AUTO: 81 FL (ref 82–98)
PLATELET # BLD AUTO: 372 THOUSANDS/UL (ref 149–390)
PMV BLD AUTO: 11.5 FL (ref 8.9–12.7)
POTASSIUM SERPL-SCNC: 3.2 MMOL/L (ref 3.5–5.3)
RBC # BLD AUTO: 4.19 MILLION/UL (ref 3.81–5.12)
SODIUM SERPL-SCNC: 139 MMOL/L (ref 135–147)
WBC # BLD AUTO: 11.98 THOUSAND/UL (ref 4.31–10.16)

## 2023-03-02 RX ORDER — INSULIN GLARGINE 100 [IU]/ML
20 INJECTION, SOLUTION SUBCUTANEOUS
Status: DISCONTINUED | OUTPATIENT
Start: 2023-03-02 | End: 2023-03-05

## 2023-03-02 RX ADMIN — ASPIRIN 81 MG: 81 TABLET, COATED ORAL at 08:29

## 2023-03-02 RX ADMIN — METFORMIN HYDROCHLORIDE 1000 MG: 500 TABLET, EXTENDED RELEASE ORAL at 18:04

## 2023-03-02 RX ADMIN — INSULIN GLARGINE 20 UNITS: 100 INJECTION, SOLUTION SUBCUTANEOUS at 21:40

## 2023-03-02 RX ADMIN — BUDESONIDE AND FORMOTEROL FUMARATE DIHYDRATE 1 PUFF: 160; 4.5 AEROSOL RESPIRATORY (INHALATION) at 08:30

## 2023-03-02 RX ADMIN — FERROUS SULFATE TAB 325 MG (65 MG ELEMENTAL FE) 325 MG: 325 (65 FE) TAB at 18:03

## 2023-03-02 RX ADMIN — ATORVASTATIN CALCIUM 40 MG: 40 TABLET, FILM COATED ORAL at 08:29

## 2023-03-02 RX ADMIN — INSULIN LISPRO 12 UNITS: 100 INJECTION, SOLUTION INTRAVENOUS; SUBCUTANEOUS at 14:15

## 2023-03-02 RX ADMIN — METOPROLOL TARTRATE 50 MG: 50 TABLET, FILM COATED ORAL at 08:29

## 2023-03-02 RX ADMIN — ENOXAPARIN SODIUM 60 MG: 60 INJECTION SUBCUTANEOUS at 08:29

## 2023-03-02 RX ADMIN — METFORMIN HYDROCHLORIDE 1000 MG: 500 TABLET, EXTENDED RELEASE ORAL at 10:30

## 2023-03-02 RX ADMIN — LOSARTAN POTASSIUM 100 MG: 50 TABLET, FILM COATED ORAL at 08:29

## 2023-03-02 RX ADMIN — INSULIN LISPRO 12 UNITS: 100 INJECTION, SOLUTION INTRAVENOUS; SUBCUTANEOUS at 08:27

## 2023-03-02 RX ADMIN — INSULIN LISPRO 12 UNITS: 100 INJECTION, SOLUTION INTRAVENOUS; SUBCUTANEOUS at 18:04

## 2023-03-02 RX ADMIN — METOPROLOL TARTRATE 50 MG: 50 TABLET, FILM COATED ORAL at 21:40

## 2023-03-02 RX ADMIN — FERROUS SULFATE TAB 325 MG (65 MG ELEMENTAL FE) 325 MG: 325 (65 FE) TAB at 08:29

## 2023-03-02 RX ADMIN — PREDNISONE 4 MG: 1 TABLET ORAL at 08:29

## 2023-03-02 RX ADMIN — BUDESONIDE AND FORMOTEROL FUMARATE DIHYDRATE 1 PUFF: 160; 4.5 AEROSOL RESPIRATORY (INHALATION) at 18:03

## 2023-03-02 RX ADMIN — INSULIN LISPRO 12 UNITS: 100 INJECTION, SOLUTION INTRAVENOUS; SUBCUTANEOUS at 10:30

## 2023-03-02 NOTE — PLAN OF CARE
Problem: INFECTION - ADULT  Goal: Absence or prevention of progression during hospitalization  Description: INTERVENTIONS:  - Assess and monitor for signs and symptoms of infection  - Monitor lab/diagnostic results  - Monitor all insertion sites, i e  indwelling lines, tubes, and drains  - Monitor endotracheal if appropriate and nasal secretions for changes in amount and color  - [place] appropriate cooling/warming therapies per order  - Administer medications as ordered  - Instruct and encourage patient and family to use good hand hygiene technique  - Identify and instruct in appropriate isolation precautions for identified infection/condition  Outcome: Progressing

## 2023-03-02 NOTE — ASSESSMENT & PLAN NOTE
Initial /79  - Patient reports has not been taking any of her medication for 1-2 weeks due to not poor diet and not feeling well  - Home regimen: Losartan 100 mg daily, metoprolol tartrate 50 mg twice daily  - Follows cardiology outpatient, last visit 4/27/22    Assessment:  - Poorly controlled BP on admission, likely due to not taking her medications  - Goal BP < 140/90  - Most recent Blood Pressure: 981/15   - Systolic (78DJB), GTJ:446 , Min:130 , KYP:421   - Diastolic (76BNB), YFU:18, Min:64, Max:79    Plan:  - Resume home meds  - Labetalol 10 mg Q6H PRN for SBP > 180  - Vitals per unit routine

## 2023-03-02 NOTE — ASSESSMENT & PLAN NOTE
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  - Average BG last 72 hrs: (P) 295 [id number]     Plan:  - Hold home insulin for now, consider resume on discharge  - Start ISS for tighter BG control as patient resumed on home prednisone, ISS alg 5  - lantus 20u qhs  - CHO ctrl diet  - Recommend close follow-up with endocrinologist outpatient    Results from last 7 days   Lab Units 03/03/23  0601 03/02/23  2111 03/02/23  1550 03/02/23  1412 03/02/23  1001 03/02/23  0600 03/01/23  2220   POC GLUCOSE mg/dl 180* 275* 278* 319* 323* 321* 314* DISPLAY PLAN FREE TEXT

## 2023-03-02 NOTE — PHYSICAL THERAPY NOTE
Physical Therapy Evaluation     Patient's Name: Hi Lezama    Admitting Diagnosis  Homelessness [Z59 00]  Generalized weakness [R53 1]  Insulin dependent type 2 diabetes mellitus ([facility] ) [E11 9, Z79 4]  Ambulatory dysfunction [R26 2]  Known medical problems [Z78 9]    Problem List  Patient Active Problem List   Diagnosis    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, with long-term current use of insulin    Seizures (HCC)    Exertional dyspnea    Enlarged pulmonary artery (HCC)    Aortic dilatation (HCC)    Anemia    Decreased pulses in feet    Hyperlipidemia    Microalbuminuria    Obstructive sleep apnea (adult) (pediatric)    Class 3 severe obesity with serious comorbidity and body mass index (BMI) of 50 0 to 59 9 in adult ([facility] )    Neuropathy of hand, right    Prolonged QT interval    Visual impairment in both eyes    Vitamin D deficiency    Lung nodules    Orthostatic hypotension    Mild intermittent asthma without complication    Type 2 diabetes mellitus with microalbuminuria, with long-term current use of insulin (HCC)    Frequent headaches    Other inflammatory and immune myopathies, not elsewhere classified    Transaminitis    Morbid obesity ([facility])    Polyneuropathy associated with underlying disease ([facility] )    PMR (polymyalgia rheumatica) ([facility])    Thrombocytosis    Left cavernous carotid aneurysm    Type 2 diabetes mellitus with hyperglycemia, with long-term current use of insulin ([facility])    Aneurysm ([facility])    Thyroid nodule    History of absence seizures    Hypersomnia    Migraine without aura and without status migrainosus, not intractable    Cerebral aneurysm without rupture    Chronic migraine without aura without status migrainosus, not intractable    Diabetes mellitus ([facility] )    Essential hypertension    Depressed mood    Blurry vision, bilateral    Ulnar neuropathy of right upper extremity    Polymyalgia rheumatica (HCC)    Gait instability    Stage 3a chronic kidney disease ([facility] ) Chronic migraine without aura, not intractable, without status migrainosus    Obstructive sleep apnea    Diabetic neuropathy (HCC)    Unsteadiness on feet    Sepsis (HCC)    Abnormal urinalysis    Weakness    Intertrigo    Leg numbness    Homelessness    Bilateral lower extremity edema    Cough       Past Medical History  Past Medical History:   Diagnosis Date    Anemia     Arthritis     Benign hypertension     Procedure/Onset: 01/01/2005; Description: BP elevated today  Pt reports running out of BP meds 2wks ago  Will resume BP meds  Diabetes mellitus ([facility] )     Diabetes mellitus type 2 with complications, uncontrolled 9/8/2015    Dyspnea on exertion     Hyperlipidemia     Hypertension     Legally blind 2010    Mild intermittent asthma with exacerbation 8/4/2018    Miscarriage     x 3    Polymyalgia rheumatica ([facility] ) 11/24/2021    Seizures ([facility] )     Sickle cell trait ([facility])     Sleep apnea     Stage 3a chronic kidney disease ([facility] ) 5/19/2022    Thyroid nodule 3/6/2020       Past Surgical History  Past Surgical History:   Procedure Laterality Date    CATARACT EXTRACTION Bilateral     august and september    EYE SURGERY      HYSTERECTOMY      37    OOPHORECTOMY Left     37    CA LIGATION/BIOPSY TEMPORAL ARTERY Bilateral 10/17/2019    Procedure: BIOPSY ARTERY TEMPORAL;  Surgeon: Moriah Weinstein DO;  [facility];  Service: Vascular    US GUIDED THYROID BIOPSY  5/13/2020 03/02/23 1332   PT Last Visit   PT Visit Date 03/02/23   Note Type   Note type Evaluation   Pain Assessment   Pain Assessment Tool 0-10   Pain Score No Pain   Restrictions/Precautions   Weight Bearing Precautions Per Order No   Other Precautions Cognitive; Bed Alarm; Chair Alarm;Multiple lines; Fall Risk   Home Living   Type of Home   (homeless shelter)   Home Layout One level;Performs ADLs on one level; Able to live on main level with bedroom/bathroom   Home Equipment Walker;Cane;Wheelchair-manual   Prior Function   Level of [place] Independent with ADLs; Independent with functional mobility   Receives Help From [facility] in the last 6 months 1 to 4   General   Family/Caregiver Present Yes   Cognition   Arousal/Participation Cooperative   Orientation Level Oriented X4   Following Commands Follows one step commands without difficulty   Comments pt pleasant and cooperative   RLE Assessment   RLE Assessment   (functionally 3/5)   LLE Assessment   LLE Assessment   (functionally 3/5)   Bed Mobility   Supine to Sit 3  Moderate assistance   Additional items Assist x 1; Increased time required;Verbal cues;LE management   Sit to Supine Unable to assess   Additional Comments pt supine in bed upon arrival  pt sititng OOB in recliner with all needs wihtin reach   Transfers   Sit to Stand 3  Moderate assistance   Additional items Assist x 2; Increased time required;Verbal cues   Stand to Sit 3  Moderate assistance   Additional items Assist x 2; Increased time required;Verbal cues   Additional Comments transfers with RW, cues for hand placement and sequencing   Ambulation/Elevation   Gait pattern Excessively slow; Short stride; Foward flexed;Decreased foot clearance; Improper Weight shift   Gait Assistance 4  Minimal assist   Additional items Assist x 2;Verbal cues; Tactile cues   Assistive Device Rolling walker   Distance 5ft to bedside chair   Balance   Static Sitting Fair   Dynamic Sitting Fair -   Static Standing Poor +   Dynamic Standing Poor   Ambulatory Poor   Endurance Deficit   Endurance Deficit Yes   Endurance Deficit Description fatigue, generalized weakness, deconiditioning   Activity Tolerance   Activity Tolerance Patient limited by fatigue   Medical Staff Made Aware OT; co-eval performed this date 2* increased medical complexity and multiple co-morbidities   Nurse Made Aware RN cleared pt to participate in therapy session   Assessment   Prognosis Good   Problem List Decreased strength;Decreased endurance; Impaired balance;Decreased mobility   Assessment Pt is a 57 y o  female seen for PT evaluation s/p admit to [facility] on 2/28/2023  Pt was admitted with a primary dx of: weakness s/p fall  PT now consulted for assessment of mobility and d/c needs  Pt with Up and OOB as tolerated  orders  Pts current comorbidities effecting treatment include: aortic dilation, hyperlipidemia, obesity, asthma, DM, cerebral aneursym, b/l LE edema  Pts current clinical presentation is Unstable/ Unpredictable (high complexity) due to Ongoing medical management for primary dx, Increased reliance on more restrictive AD compared to baseline, Decreased activity tolerance compared to baseline, Fall risk, Increased assistance needed from caregiver at current time, Trending lab values, Continuous pulse oximetry monitoring   Prior to admission, pt was residing at homeless shelter, currently looking for apartment, independent with ADLs however having increased difficulty  Upon evaluation, pt currently is requiring mod A for bed mobility; mod Ax2 for transfers; min Ax2 for ambulation 5 ft w/ RW  Pt presents at PT eval functioning below baseline and currently w/ overall mobility deficits 2* to: BLE weakness, decreased ROM, impaired balance, decreased endurance, impaired coordination, gait deviations, decreased activity tolerance compared to baseline, decreased functional mobility tolerance compared to baseline, fall risk  Pt currently at a fall risk 2* to impairments listed above  Pt will continue to benefit from skilled acute PT interventions to address stated impairments; to maximize functional mobility; for ongoing pt/ family training; and DME needs  At conclusion of PT session pt returned back in chair and chair alarm engaged with phone and call bell within reach  Pt denies any further questions at this time  The patient's AM-PAC Basic Mobility Inpatient Short Form Raw Score is 9   A Raw score of less than or equal to 16 suggests the patient may benefit from discharge to post-acute rehabilitation services  Please also refer to the recommendation of the Physical Therapist for safe discharge planning  Recommend STR upon hospital D/C  Goals   Patient Goals to get stronger   STG Expiration Date 03/16/23   Short Term Goal #1 STG 1  Pt will be able to perform bed mobility tasks with mod I level in order to improve overall functional mobility and assist in safe d/c  STG 2  Pt with sit EOB for at least 25 minutes at mod I level in order to strengthen abdominal musculature and assist in future transfers/ ambulation  STG 3  Pt will be able to perform functional transfer with mod I level in order to improve overall functional mobility and assist in safe d/c  STG 4  Pt will be able to ambulate at least 250 feet with least restrictive device with mod I level A in order to improve overall functional mobility and assist in safe d/c  STG 5  Pt will improve sitting/standing static/dynamic balance 1/2 grade in order to improve functional mobility and assist in safe d/c  STG 6  Pt will improve LE strength by 1/2 grade in order to improve functional mobility and assist in safe d/c  STG 7  Pt will be able to negotiate at least 1 stairs with least restrictive device with mod I level A in order to improve overall functional mobility and assist in safe d/c    PT Treatment Day 0   Plan   Treatment/Interventions Functional transfer training;LE strengthening/ROM; Patient/family training;Equipment eval/education; Bed mobility;Gait training;Spoke to nursing;Spoke to case management;OT   PT Frequency 3-5x/wk   Recommendation   PT Discharge Recommendation Post acute rehabilitation services   AM-PAC Basic Mobility Inpatient   Turning in Flat Bed Without Bedrails 3   Lying on Back to Sitting on Edge of Flat Bed Without Bedrails 2   Moving Bed to Chair 1   Standing Up From Chair Using Arms 1   Walk in Room 1   Climb 3-5 Stairs With Railing 1   Basic Mobility Inpatient Raw Score 9   Highest Level Of Mobility   -HLM Goal 3: Sit at edge of bed   -HLM Achieved 4: Move to chair/commode   Modified [place] Scale   Modified [place] Scale 4         Emilia Kennedy PT DPT

## 2023-03-02 NOTE — OCCUPATIONAL THERAPY NOTE
Occupational Therapy Evaluation     Patient Name: Gonzalo HOOD Date: 3/2/2023  Problem List  Principal Problem:    Weakness  Active Problems: Aortic dilatation (HCC)    Anemia    Hyperlipidemia    Class 3 severe obesity with serious comorbidity and body mass index (BMI) of 50 0 to 59 9 in adult [facility])    Prolonged QT interval    Mild intermittent asthma without complication    Cerebral aneurysm without rupture    Diabetes mellitus (HCC)    Essential hypertension    Polymyalgia rheumatica (HCC)    Stage 3a chronic kidney disease (HCC)    Obstructive sleep apnea    Abnormal urinalysis    Homelessness    Bilateral lower extremity edema    Cough    Past Medical History  Past Medical History:   Diagnosis Date    Anemia     Arthritis     Benign hypertension     Procedure/Onset: 01/01/2005; Description: BP elevated today  Pt reports running out of BP meds 2wks ago  Will resume BP meds      Diabetes mellitus ([facility] )     Diabetes mellitus type 2 with complications, uncontrolled 9/8/2015    Dyspnea on exertion     Hyperlipidemia     Hypertension     Legally blind 2010    Mild intermittent asthma with exacerbation 8/4/2018    Miscarriage     x 3    Polymyalgia rheumatica ([address] ) 11/24/2021    Seizures ([address] )     Sickle cell trait (HCC)     Sleep apnea     Stage 3a chronic kidney disease ([facility] ) 5/19/2022    Thyroid nodule 3/6/2020     Past Surgical History  Past Surgical History:   Procedure Laterality Date    CATARACT EXTRACTION Bilateral     august and september    EYE SURGERY      HYSTERECTOMY      37    OOPHORECTOMY Left     37    SD LIGATION/BIOPSY TEMPORAL ARTERY Bilateral 10/17/2019    Procedure: BIOPSY ARTERY TEMPORAL;  Surgeon: Radha Bejarano DO;  [facility];  Service: Vascular    US GUIDED THYROID BIOPSY  5/13/2020 03/02/23 1300   Note Type   Note type Evaluation   Pain Assessment   Pain Assessment Tool 0-10   Pain Score No Pain   Restrictions/Precautions   Other Precautions Cognitive; Chair Alarm; Bed Alarm;Multiple lines; Fall Risk   Home Living   Type of Home Other (Comment)  (homeless shelter)   Home Layout One level;Performs ADLs on one level; Able to live on main level with bedroom/bathroom  (Continue to assess)   Bathroom Shower/Tub None  (Pt spongebathes and doesn't get into the shower)   Bathroom Toilet Standard  (Continue to assess)   Bathroom Equipment   (Continue to assess)   Bathroom Accessibility   (Continue to assess)   Home Equipment Walker;Cane;Wheelchair-manual   Additional Comments Continue to assess   Prior Function   Level of [place] Independent with functional mobility; Needs assistance with ADLs   Lives With Son   Receives Help From Family   IADLs Family/Friend/Other provides transportation; Family/Friend/Other provides meals; Independent with medication management   Falls in the last 6 months 1 to 4   Vocational   (Continue to assess)   Comments Pt lives with her son in a homeless shelter  She needs help getting dressed, doesn't drive, uses DME for functional mobiltiy   Lifestyle   Autonomy Requires assistance with ADLs, IADLs, driving, and uses cane and walker for functional mobility   Reciprocal Relationships Lives with her son   Service to Others Continue to assess   Padmini Chiang Enjoys going to [employer]   ADL   Where Assessed Supine, bed   Eating Assistance 5  Supervision/Setup   Grooming Assistance 5  Supervision/Setup   UB Bathing Assistance 4  Minimal Assistance   LB Bathing Assistance 2  [address]imal [address] 4  [address] 2  Maximal [address]  2  [address] 3  Moderate Assistance   Bed Mobility   Supine to Sit 3  Moderate assistance   Additional items Assist x 1; Increased time required;Verbal cues;LE management   Sit to Supine Unable to assess   Additional Comments Pt greeted supine in bed with all needs within reach   Pt left seated in bedside chair with all needs within reach  Transfers   Sit to Stand 3  Moderate assistance   Additional items Assist x 2; Increased time required;Verbal cues   Stand to Sit 3  Moderate assistance   Additional items Assist x 2; Increased time required;Verbal cues   Additional Comments Transfer with RW  Verbal cues for sequencing during transfer and hand placement on RW   Functional Mobility   Functional Mobility 3  Moderate assistance   Additional Comments Pt performed functional mobility from bed>chair with 2-3 lateral steps  Additional items Rolling walker   Balance   Static Sitting Fair   Dynamic Sitting Fair -   Static Standing Poor +   Dynamic Standing Poor   Ambulatory Poor   Activity Tolerance   Activity Tolerance Patient limited by fatigue   Medical Staff Made Aware Seen with PT Shannon Healy due to pt medical complexity and multiple comorbidities   Nurse Made Aware RN ok with eval   RUE Assessment   RUE Assessment WFL  (Continue to assess  full ROM)   LUE Assessment   LUE Assessment WFL  (Continue to assess  full ROM)   Hand Function   Gross Motor Coordination Functional  (Manipulated remote in hand)   Fine Motor Coordination Functional  (Demonstrated pushing button to call nurse)   Vision-Basic Assessment   Current Vision Wears glasses only for reading   Visual History Other (Comment)  (Pt reports- "legally blind")   Vision - Complex Assessment   Tracking Intact   Cognition   Overall Cognitive Status (S)  Impaired   Arousal/Participation Responsive; Cooperative   Attention Attends with cues to redirect   Orientation Level Oriented X4   Memory Decreased recall of recent events   Following Commands Follows one step commands with increased time or repetition   Comments Pt pleasant and cooperative to work with  Pt demonstrated slow processing speed, decreased recall of recent events  Pt presented with typical affect and was A&Ox4  Continue to assess   Assessment   Limitation Decreased ADL status; Decreased cognition;Decreased endurance;Decreased self-care trans;Decreased high-level ADLs   Prognosis Fair   Assessment Pt is a 57 y o  female admitted to [facility] on [facility]/28/2023 w/ weakness progressively worsening generalized weakness after a fall at home  Pt diagnosed w/ cough, b/l LE edema, homelessness, abnormal urinalysis, obstructive sleep apnea, stage 3 CKD, polymyalgia rheumatica, HTN, DM, cerebral aneurysm, mild asthma, prolonged QT interval, hyperlipidemia, anemia, aortic dilatation, weakness  Pt  has a past medical history of Anemia, Arthritis, Benign hypertension, Diabetes mellitus ([facility] ), Diabetes mellitus type 2 with complications, uncontrolled (9/8/2015), Dyspnea on exertion, Hyperlipidemia, Hypertension, Legally blind (2010), Mild intermittent asthma with exacerbation (8/4/2018), Miscarriage, Polymyalgia rheumatica ([place] [id number] ) (11/24/2021), Seizures ([place] [id number] ), Sickle cell trait ([place] [address] ), Sleep apnea, Stage 3a chronic kidney disease ([place] [address] ) (5/19/2022), and Thyroid nodule (3/6/2020)  Pt with active OT orders and up and OOB as tolerated orders  Pt precautions include cognitive, chair/bed alarm, multiple lines, and fall risk  Pt currently lives with her son and is currently homeless  She requires assistance with ADLs, IADLs, does not drive and uses a w/c/RW/cane for functional mobility  Patient participated in OT evaluation and answered all the questions while A&Ox4  Patient participated in bed mobility with ModA, functional transfers sit<>stand ModA, and functional mobility ModA w/ RW  Pt is limited at this time 2*: endurance, activity tolerance, functional mobility, balance, functional standing tolerance, unsupportive home environment, decreased I w/ ADLS/IADLS and cognitive impairments  The following Occupational Performance Areas to address include: grooming, bathing/shower, toilet hygiene, dressing, medication management, health maintenance, functional mobility, community mobility and clothing management       From OT standpoint, anticipate d/c inpatient rehab   Pt to continue to benefit from acute immediate OT services to address the following goals 2-3x/week to  w/in 10-14 days:  The patient's raw score on the AM-PAC Daily Activity Inpatient Short Form is 15  A raw score of less than 19 suggests the patient may benefit from discharge to post-acute rehabilitation services  Please refer to the recommendation of the Occupational Therapist for safe discharge planning  Goals   Patient Goals To get stronger   LTG Time Frame 10-   Long Term Goal #1 See below   Plan   Treatment Interventions ADL retraining;Visual perceptual retraining;Functional transfer training; Endurance training;Cognitive reorientation;Patient/family training;Equipment evaluation/education;Continued evaluation; Activityengagement   Goal Expiration Date 23   OT Frequency 2-3x/wk   Recommendation   OT Discharge Recommendation Post acute rehabilitation services   AM-PAC Daily Activity Inpatient   Lower Body Dressing 2   Bathing 2   Toileting 2   Upper Body Dressing 3   Grooming 3   Eating 3   Daily Activity Raw Score 15   Daily Activity Standardized Score (Calc for Raw Score >=11) 34 69   AM-PAC Applied Cognition Inpatient   Following a Speech/Presentation 2   Understanding Ordinary Conversation 4   Taking Medications 3   Remembering Where Things Are Placed or Put Away 3   Remembering List of 4-5 Errands 2   Taking Care of Complicated Tasks 2   Applied Cognition Raw Score 16   Applied Cognition Standardized Score 35 03   End of Consult   Patient Position at End of Consult Bedside chair; All needs within reach   Nurse Communication Nurse aware of consult     Goals:   Patient will participate in self feeding and grooming tasks with Mod I with adaptive devices PRN to engage in functional activities  Patient will participate in UB bathing, and dressing activity with Mod I with adaptive devices PRN to engage in functional tasks      Patient will participate in LB, bathing, and dressing activity with Mod I with adaptive devices PRN to engage in functional tasks  Patient will participate in toileting activity with Mod I to increase functional safety  Patient will participate in functional transfers with Mod I with good safety awareness and adaptive devices PRN to increase ADL and IADL safety  Patient will participate in bed mobility with Mod I to engage in functional tasks  Patient will perform functional mobility with Mod I with assistive device PRN and good safety awareness  Patient will participate in balance activities to increase standing balance to Fair to engage in functional tasks  Patient will participate in energy conservation education to increase functional endurance  Patient will engage in 3-4 minute standing tolerance activity to increase independence with light ADL participation  Patient will participate in ongoing cognitive assessment with good engagement for safe discharge to home  Patient will participate in visual impairment compensatory strategy training with good engagement for safe discharge to home         Lacie Decker, OTS

## 2023-03-02 NOTE — PROGRESS NOTES
[address] [facility]  Progress Note - Yulia Wiggins 1960, 57 y o  female MRN: [id number]  Unit/Bed#: -01 Encounter: [id number]  Primary Care Provider: Ilsa Alejandro MD   Date and time admitted to hospital: 2/28/2023  3:13 PM    Cough  Assessment & Plan  Patient reports acute nonproductive cough started several weeks ago a/w exertional dyspnea  Also noted chronic bilateral lower extremity edema started several years ago  - 2/14/23 LE doppler: No evidence of acute or chronic DVT  - 2/17/23 CXR: Cardiomegaly with increased bilateral opacities likely representing CHF  - 2/28/23 COVID/flu/RSV negative  - 2/28/23 ProBNP 95  - Follows outpatient cardiology, last visit 4/27/22  - Initial exam: +Bibasilar rales  Coarse lung sounds  No wheezing or crackles noted  Bilateral 3+ pitting edema with venous stasis dermatitis and very dry skin  - 3/1/23 ECHO: LVEF 60%  Normal systolic function  Normal wall motion  G1DD  Pulmonary artery systolic pressure is mildly increased    Assessment: Acute cough could be secondary to viral respiratory infection  However cannot r/o fluid overloaded state in setting of bilateral LE edema in bibasilar rales  DDx includes hypoalbuminemia vs venous insufficiency vs CHF    Plan:  - Gentle diuresis as renally tolerated  - Monitor I&O    Bilateral lower extremity edema  Assessment & Plan  Please see detailed A&P under "Acute cough"      Homelessness  Assessment & Plan  Patient reports both her and her son have been homelessness since Feb 2023 after eviction from her apartment    -  consulted for social support    Abnormal urinalysis  Assessment & Plan  H/O abnormal UA with negative urine culture in the past  - Patient denies any abdominal pain or dysuria  - 2/28/23 UA: >300 protein, 500 glucose, 1+ ketone, small blood  - 2/28/23 Urine micro: innumerable RBC and WBC  - 2/28/23 UCX in process     Plan:  - Monitor clinically off ABX   - Follow-up UCX and initiate ABX if culture positive    Results from last 7 days   Lab Units 02/28/23 2010   URINE CULTURE  Culture too young- will reincubate       Obstructive sleep apnea  Assessment & Plan  H/O MOHAN, however has not been using her CPAP due to being homeless  -Follows sleep medicine outpatient, last visit was 722    Plan:  - Continue CPAP nightly    Stage 3[facility] ([facility]  Assessment & Plan  Initial sCr 1 16  - Baseline sCr 1 0-1 1    Assessment: stable CKD-3    Plan:  - Monitor renal function  - Avoid nephrotoxic drugs    Results from last 7 days   Lab Units 03/01/23  0415 02/28/23  1639   BUN mg/dL 11 10   CREATININE mg/dL 1 08 1 16   EGFR ml/min/1 73sq m 54 49       Polymyalgia rheumatica (HCC)  Assessment & Plan  H/O polymyalgia rheumatica  - Patient reports has not been taking any of her medication for 1-2 weeks due to not poor diet and not feeling well  - Home regimen: Prednisone 4mg daily (became immoble on 2mg dose due to severe aches/pains)  - Follows outpatient neurology (last visit 6/9/22) and rheumatology (last visit 2/8/22)    Plan:  - Resume home medication  - Monitor BG    Essential hypertension  Assessment & Plan  Initial /79  - Patient reports has not been taking any of her medication for 1-2 weeks due to not poor diet and not feeling well  - Home regimen: Losartan 100 mg daily, metoprolol tartrate 50 mg twice daily  - Follows cardiology outpatient, last visit 4/27/22    Assessment:  - Poorly controlled BP on admission, likely due to not taking her medications  - Goal BP < 140/90  - Most recent Blood Pressure: 453/80   - Systolic (15YYA), HOUSTON:461 , Min:155 , JDW:562   - Diastolic (13OSP), ZKZ:86, Min:73, Max:92    Plan:  - Resume home meds  - Labetalol 10 mg Q6H PRN for SBP > 180  - Vitals per unit routine    Diabetes mellitus ([facility] )  Assessment & Plan  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  - Average BG last 72 hrs: (P) 308 75     Plan:  - Hold home insulin for now, consider resume on discharge  - Start ISS for tighter BG control as patient resumed on home prednisone  - Started lantus 10qhs on 3/1, will likely need increase  - CHO ctrl diet  - Recommend close follow-up with endocrinologist outpatient    Results from last 7 days   Lab Units 03/02/23  0600 03/01/23  2220 03/01/23  1637 03/01/23  1401 03/01/23  1341 03/01/23  1113 03/01/23  0914   POC GLUCOSE mg/dl 321* 314* 227* 227* 269* 341* 347*       Cerebral aneurysm without rupture  Assessment & Plan  Patient denies any headache, dizziness, nausea, vomiting, or any focal neurological symptoms  - 3/7/22 CTA head: no acute intracranial disease, volume loss greater than expected for age  Two, 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  - 2/8/23 CT head: no acute intracranial abnormality   - Follows outpatient neurosurgery, last visit  3/16/22    Plan:  - Monitor clinically  - Low threshold to consult neurosurgery if patient develops severe headache or new neurological symptoms    Mild intermittent asthma without complication  Assessment & Plan  Continue home Symbicort for mild intermittent asthma  - Denies any chest tightness  - No active wheezing on exam    Prolonged QT interval  Assessment & Plan  QTc 507 on 2/28/23 EKG    Hyperlipidemia  Assessment & Plan  H/O HLD  - Patient reports has not been taking any of her medication for 1-2 weeks due to not poor diet and not feeling well  - Home regimen: Atorvastatin 40mg QD  - 5/7/21 lipid panel: Chol 164, , HDL 45, LDL 96    Assessment: ASCVD 23 5%    Plan:  - Continue home meds  - Will recheck fasting lipid panel    Lab Results   Component Value Date    CHOLESTEROL 157 03/01/2023    TRIG 117 03/01/2023    HDL 31 (L) 03/01/2023    LDLCALC 103 (H) 03/01/2023       Anemia  Assessment & Plan  Initial Hgb 11 3  - H/O chronic anemia not on home FeSO4 supplement  - 2/28/23 iron panel: Fe 40, FeSat 15%, TIBC 266, Ferritin 50    Assessment: Chronic anemia likely secondary to chronic disease vs CKD vs iron deficiency  Low suspicion for acute blood loss    Plan:  - Monitor Hgb  - Start Fe supplement  - Miralax PRN for iron-induced constipation  - Will check B12, folate    Results from last 7 days   Lab Units 03/01/23  0415 02/28/23  1639   HEMOGLOBIN g/dL 9 9* 11 3*   HEMATOCRIT % 33 8* 37 1       Aortic dilatation (HCC)  Assessment & Plan  H/O Stable ascending thoracic aortic aneurysm  - Patient denies any chest pain on admission  - 2/18/22 CT chest: stable ectasia of the ascending thoracic aorta measuring 4 2 cm  - Follows cardiology outpatient, last visit 4/27/22    Plan:  - Monitor clinically  - Low threshold for repeat CT chest w/ contrast if patient develops acute onset chest pain    * Weakness  Assessment & Plan  Patient reports progressively worsening generalized weakness started several month ago and was recently evicted from her apartment early Feb 2023 after sustaining a fall at home  Patient states she has difficulty ambulating, currently wheelchair dependent, and also has difficulty with standing up and sitting down from the toilet  - 2/28/23 Prealbumin 10 3 (low)  - Initial exam: 3/5 strength bilateral LEs and 4/5 strength bilateral UEs  No focal deficits  CN II-XII grossly intact  - 3/1 Rapid Response:slid out of bed and landed on bottom  No headstrike  Assessment: Generalized weakness likely multifactorial in setting of nutrition, chronic diseases, and comorbidities      Plan:  - Nutrition supplement with Glucerna 3 times daily with meals  - Fall precaution  - PT/OT consulted for rehab recommendation    Subjective:   No acute events overnight  Pt examined at bedside this AM, sitting up in bed comfortably eating breakfast  Expresses concern about falling again, states that it is one of the reasons she had to leave her apartment was because she fell  Denies any chest pain, SOB, headache, or pain anywhere else  Denies any acute complaints or concerns at this time  Objective:     Vitals: Blood pressure 155/79, pulse 90, temperature 99 3 °F (37 4 °C), resp  rate 18, height 5' 8" (1 727 m), weight (!) 152 kg (335 lb), SpO2 94 %, not currently breastfeeding  ,Body mass index is 50 94 kg/m²  Intake/Output Summary (Last 24 hours) at 3/2/2023 0902  Last data filed at 3/2/2023 1629  Gross per 24 hour   Intake 240 ml   Output 700 ml   Net -460 ml       Physical Exam:   Physical Exam  Constitutional:       General: She is not in acute distress  Appearance: Normal appearance  She is obese  She is not ill-appearing or toxic-appearing  HENT:      Head: Normocephalic and atraumatic  Right Ear: External ear normal       Left Ear: External ear normal       Nose: Nose normal       Mouth/Throat:      Mouth: Mucous membranes are moist       Pharynx: Oropharynx is clear  Eyes:      General: No scleral icterus  Conjunctiva/sclera: Conjunctivae normal    Cardiovascular:      Rate and Rhythm: Normal rate and regular rhythm  Heart sounds: Normal heart sounds  No murmur heard  Pulmonary:      Effort: Pulmonary effort is normal       Breath sounds: Normal breath sounds  No wheezing, rhonchi or rales  Abdominal:      General: Bowel sounds are normal       Palpations: Abdomen is soft  Tenderness: There is no abdominal tenderness  There is no guarding or rebound  Musculoskeletal:         General: Normal range of motion  Cervical back: Neck supple  Right lower leg: Edema (2-3+ pitting) present  Left lower leg: Edema (2-3+ pitting) present     Skin: General: Skin is warm and dry  Coloration: Skin is not jaundiced  Neurological:      General: No focal deficit present  Mental Status: She is alert and oriented to person, place, and time  Psychiatric:         Mood and Affect: Mood normal          Behavior: Behavior normal          Invasive Devices     Peripheral Intravenous Line  Duration           Peripheral IV 02/28/23 Left Antecubital 1 day                           Lab and other studies:  I have personally reviewed pertinent reports       Admission on 02/28/2023   Component Date Value   • POC Glucose 02/28/2023 424 (H)    • WBC 02/28/2023 9 73    • RBC 02/28/2023 4 80    • Hemoglobin 02/28/2023 11 3 (L)    • Hematocrit 02/28/2023 37 1    • MCV 02/28/2023 77 (L)    • MCH 02/28/2023 23 5 (L)    • MCHC 02/28/2023 30 5 (L)    • RDW 02/28/2023 16 8 (H)    • MPV 02/28/2023 10 3    • Platelets 89/29/5443 437 (H)    • nRBC 02/28/2023 0    • Neutrophils Relative 02/28/2023 69    • Immat GRANS % 02/28/2023 1    • Lymphocytes Relative 02/28/2023 19    • Monocytes Relative 02/28/2023 7    • Eosinophils Relative 02/28/2023 3    • Basophils Relative 02/28/2023 1    • Neutrophils Absolute 02/28/2023 6 89    • Immature Grans Absolute 02/28/2023 0 05    • Lymphocytes Absolute 02/28/2023 1 84    • Monocytes Absolute 02/28/2023 0 63    • Eosinophils Absolute 02/28/2023 0 25    • Basophils Absolute 02/28/2023 0 07    • Sodium 02/28/2023 133 (L)    • Potassium 02/28/2023 3 6    • Chloride 02/28/2023 99    • CO2 02/28/2023 29    • ANION GAP 02/28/2023 5    • BUN 02/28/2023 10    • Creatinine 02/28/2023 1 16    • Glucose 02/28/2023 465 (H)    • Calcium 02/28/2023 9 5    • Corrected Calcium 02/28/2023 10 6 (H)    • AST 02/28/2023 18    • ALT 02/28/2023 16    • Alkaline Phosphatase 02/28/2023 165 (H)    • Total Protein 02/28/2023 7 5    • Albumin 02/28/2023 2 6 (L)    • Total Bilirubin 02/28/2023 0 59    • eGFR 02/28/2023 49    • BETA-HYDROXYBUTYRATE 02/28/2023 1 4 (H) • Color, UA 02/28/2023     • pH, [place] 02/28/2023 7 461 (H)    • pCO2, [place] 02/28/2023 41 9 (L)    • pO2, [place] 02/28/2023 33 8 (L)    • HCO3, [place] 02/28/2023 29 2    • Base Excess, [place] 02/28/2023 4 9    • O2 Content, [place] 02/28/2023 11 6    • O2 HGB, VENOUS 02/28/2023 66 4    • Ventricular Rate 02/28/2023 106    • Atrial Rate 02/28/2023 121    • NM Interval 02/28/2023 160    • QRSD Interval 02/28/2023 86    • QT Interval 02/28/2023 382    • QTC Interval 02/28/2023 507    • P Axis 02/28/2023 61    • QRS Axis 02/28/2023 29    • T Wave Axis 02/28/2023 43    • SARS-CoV-2 02/28/2023 Negative    • INFLUENZA A PCR 02/28/2023 Negative    • INFLUENZA B PCR 02/28/2023 Negative    • RSV PCR 02/28/2023 Negative    • Color, UA 02/28/2023 Yellow    • Clarity, UA 02/28/2023 Clear    • pH, UA 02/28/2023 6 0    • Leukocytes, UA 02/28/2023 Negative    • Nitrite, UA 02/28/2023 Negative    • Protein, UA 02/28/2023 >=300 (A)    • Glucose, UA 02/28/2023 500 (1/2%) (A)    • Ketones, UA 02/28/2023 15 (1+) (A)    • Urobilinogen, UA 02/28/2023 0 2    • Bilirubin, UA 02/28/2023 Negative    • Occult Blood, UA 02/28/2023 Small (A)    • Specific Steele, UA 02/28/2023 >=1 030    • RBC, UA 02/28/2023 Innumerable (A)    • WBC, UA 02/28/2023 Innumerable (A)    • Epithelial Cells 02/28/2023 None Seen    • Bacteria, UA 02/28/2023 Occasional    • WBC Clumps 02/28/2023 Present    • Budding Yeast 02/28/2023 Present    • Urine Culture 02/28/2023 Culture too young- will reincubate    • Iron Saturation 02/28/2023 15    • TIBC 02/28/2023 266    • Iron 02/28/2023 40 (L)    • Ferritin 02/28/2023 50    • Vitamin B-12 02/28/2023 1,587 (H)    • Folate 02/28/2023 17 5    • Hemoglobin A1C 02/28/2023 10 3 (H)    • EAG 02/28/2023 249    • Prealbumin 02/28/2023 10 3 (L)    • A4C EF 03/01/2023 54    • LVIDd 03/01/2023 5 80    • LVIDS 03/01/2023 3 90    • IVSd 03/01/2023 1 60    • LVPWd 03/01/2023 1 50    • FS 03/01/2023 33    • MV E' Tissue Velocity Se* 03/01/2023 9    • E wave deceleration time 03/01/2023 217    • MV Peak E Constantin 03/01/2023 72    • MV Peak A Constantin 03/01/2023 0 99    • AV LVOT peak gradient 03/01/2023 3    • LVOT peak VTI 03/01/2023 20 53    • LVOT peak constantin 03/01/2023 0 92    • RVID d 03/01/2023 3 8    • Tricuspid annular plane * 03/01/2023 1 80    • LA size 03/01/2023 3 3    • LA length (A2C) 03/01/2023 6 30    • RAA A4C 03/01/2023 22 7    • Aortic valve peak veloci* 03/01/2023 1 79    • Ao VTI 03/01/2023 34 76    • AV mean gradient 03/01/2023 7    • LVOT mn grad 03/01/2023 2 0    • AV peak gradient 03/01/2023 13    • MV stenosis pressure 1/2* 03/01/2023 63    • MV valve area p 1/2 meth* 03/01/2023 3 49    • TR Peak Constantin 03/01/2023 2 8    • Triscuspid Valve Regurgi* 03/01/2023 31 0    • Ao root 03/01/2023 3 50    • Asc Ao 03/01/2023 4 3    • Aortic valve mean veloci* 03/01/2023 12 60    • Tricuspid valve peak reg* 03/01/2023 2 80    • Left ventricular stroke * 03/01/2023 96 00    • IVS 03/01/2023 1 6    • LEFT VENTRICLE SYSTOLIC * 28/19/9790 67    • LV DIASTOLIC VOLUME (MOD* 76/80/4166 164    • Left Atrium Area-systoli* 03/01/2023 25 3    • Left Atrium Area-systoli* 03/01/2023 19 8    • LVSV, 2D 03/01/2023 96    • DVI 03/01/2023 0 51    • LV EF 03/01/2023 60    • Est  RA pres 03/01/2023 8 0    • Right Ventricular Peak S* 03/01/2023 39 00    • IVC 03/01/2023 24 0    • LVOT stroke volume 03/01/2023 100 73    • LVOT area 03/01/2023 4 91    • LVOT diameter 03/01/2023 2 5    • AV valve area 03/01/2023 2 90    • NT-proBNP 02/28/2023 95    • Cholesterol 03/01/2023 157    • Triglycerides 03/01/2023 117    • HDL, Direct 03/01/2023 31 (L)    • LDL Calculated 03/01/2023 103 (H)    • WBC 03/01/2023 10 97 (H)    • RBC 03/01/2023 4 31    • Hemoglobin 03/01/2023 9 9 (L)    • Hematocrit 03/01/2023 33 8 (L)    • MCV 03/01/2023 78 (L)    • MCH 03/01/2023 23 0 (L)    • MCHC 03/01/2023 29 3 (L)    • RDW 03/01/2023 16 7 (H)    • Platelets 56/68/0279 390    • MPV 03/01/2023 10 8    • Sodium 03/01/2023 135    • Potassium 03/01/2023 3 5    • Chloride 03/01/2023 102    • CO2 03/01/2023 27    • ANION GAP 03/01/2023 6    • BUN 03/01/2023 11    • Creatinine 03/01/2023 1 08    • Glucose 03/01/2023 403 (H)    • Calcium 03/01/2023 8 7    • eGFR 03/01/2023 54    • POC Glucose 03/01/2023 347 (H)    • POC Glucose 03/01/2023 341 (H)    • POC Glucose 03/01/2023 269 (H)    • POC Glucose 03/01/2023 227 (H)    • POC Glucose 03/01/2023 227 (H)    • POC Glucose 03/01/2023 314 (H)    • Sodium 03/02/2023 139    • Potassium 03/02/2023 3 2 (L)    • Chloride 03/02/2023 105    • CO2 03/02/2023 29    • ANION GAP 03/02/2023 5    • BUN 03/02/2023 10    • Creatinine 03/02/2023 1 02    • Glucose 03/02/2023 346 (H)    • Calcium 03/02/2023 8 8    • eGFR 03/02/2023 58    • WBC 03/02/2023 11 98 (H)    • RBC 03/02/2023 4 19    • Hemoglobin 03/02/2023 9 7 (L)    • Hematocrit 03/02/2023 33 8 (L)    • MCV 03/02/2023 81 (L)    • MCH 03/02/2023 23 2 (L)    • MCHC 03/02/2023 28 7 (L)    • RDW 03/02/2023 16 9 (H)    • Platelets 96/78/8964 372    • MPV 03/02/2023 11 5    • POC Glucose 03/02/2023 321 (H)        Recent Results (from the past 24 hour(s))   Echo complete w/ contrast if indicated    Collection Time: 03/01/23  9:03 AM   Result Value Ref Range    A4C EF 54 %    LVIDd 5 80 cm    LVIDS 3 90 cm    IVSd 1 60 cm    LVPWd 1 50 cm    FS 33 28 - 44    MV E' Tissue Velocity Septal 9 cm/s    E wave deceleration time 217 ms    MV Peak E Constantin 72 cm/s    MV Peak A Constantin 0 99 m/s    AV LVOT peak gradient 3 mmHg    LVOT peak VTI 20 53 cm    LVOT peak constantin 0 92 m/s    RVID d 3 8 cm    Tricuspid annular plane systolic excursion 0 69 cm    LA size 3 3 cm    LA length (A2C) 6 30 cm    RAA A4C 22 7 cm2    Aortic valve peak velocity 1 79 m/s    Ao VTI 34 76 cm    AV mean gradient 7 mmHg    LVOT mn grad 2 0 mmHg    AV peak gradient 13 mmHg    MV stenosis pressure 1/2 time 63 ms    MV valve area p 1/2 method 3 49     TR Peak Constantin 2 8 m/s    Triscuspid Valve Regurgitation Peak Gradient 31 0 mmHg    Ao root 3 50 cm    Asc Ao 4 3 cm    Aortic valve mean velocity 12 60 m/s    Tricuspid valve peak regurgitation velocity 2 80 m/s    Left ventricular stroke volume (2D) 96 00 mL    IVS 1 6 cm    LEFT VENTRICLE SYSTOLIC VOLUME (MOD BIPLANE) 2D 67 mL    LV DIASTOLIC VOLUME (MOD BIPLANE) 2D 164 mL    Left Atrium Area-systolic Four Chamber 87 2 cm2    Left Atrium Area-systolic Apical Two Chamber 19 8 cm2    LVSV, 2D 96 mL    DVI 0 51     LV EF 60     Est  RA pres 8 0 mmHg    Right Ventricular Peak Systolic Pressure 73 49 mmHg    IVC 24 0 mm    LVOT stroke volume 100 73 cm3    LVOT area 4 91 cm2    LVOT diameter 2 5 cm    AV valve area 2 90 cm2   Fingerstick Glucose (POCT)    Collection Time: 03/01/23  9:14 AM   Result Value Ref Range    POC Glucose 347 (H) 65 - 140 mg/dl   Fingerstick Glucose (POCT)    Collection Time: 03/01/23 11:13 AM   Result Value Ref Range    POC Glucose 341 (H) 65 - 140 mg/dl   Fingerstick Glucose (POCT)    Collection Time: 03/01/23  1:41 PM   Result Value Ref Range    POC Glucose 269 (H) 65 - 140 mg/dl   Fingerstick Glucose (POCT)    Collection Time: 03/01/23  2:01 PM   Result Value Ref Range    POC Glucose 227 (H) 65 - 140 mg/dl   Fingerstick Glucose (POCT)    Collection Time: 03/01/23  4:37 PM   Result Value Ref Range    POC Glucose 227 (H) 65 - 140 mg/dl   Fingerstick Glucose (POCT)    Collection Time: 03/01/23 10:20 PM   Result Value Ref Range    POC Glucose 314 (H) 65 - 140 mg/dl   Basic metabolic panel    Collection Time: 03/02/23  4:37 AM   Result Value Ref Range    Sodium 139 135 - 147 mmol/L    Potassium 3 2 (L) 3 5 - 5 3 mmol/L    Chloride 105 96 - 108 mmol/L    CO2 29 21 - 32 mmol/L    ANION GAP 5 4 - 13 mmol/L    BUN 10 5 - 25 mg/dL    Creatinine 1 02 0 60 - 1 30 mg/dL    Glucose 346 (H) 65 - 140 mg/dL    Calcium 8 8 8 3 - 10 1 mg/dL    eGFR 58 ml/min/1 73sq m   CBC and Platelet    Collection Time: 03/02/23  4:37 AM   Result Value Ref Range WBC 11 98 (H) 4 31 - 10 16 Thousand/uL    RBC 4 19 3 81 - 5 12 Million/uL    Hemoglobin 9 7 (L) 11 5 - 15 4 g/dL    Hematocrit 33 8 (L) 34 8 - 46 1 %    MCV 81 (L) 82 - 98 fL    MCH 23 2 (L) 26 8 - 34 3 pg    MCHC 28 7 (L) 31 4 - 37 4 g/dL    RDW 16 9 (H) 11 6 - 15 1 %    Platelets 660 759 - 383 Thousands/uL    MPV 11 5 8 9 - 12 7 fL   Fingerstick Glucose (POCT)    Collection Time: 03/02/23  6:00 AM   Result Value Ref Range    POC Glucose 321 (H) 65 - 140 mg/dl     Blood Culture:   Lab Results   Component Value Date    BLOODCX No Growth After 5 Days   02/11/2023   ,   Urinalysis:   Lab Results   Component Value Date    COLORU  02/28/2023      Comment:      refer to results    COLORU Yellow 02/28/2023    COLORU Yellow 05/06/2014    CLARITYU Clear 02/28/2023    CLARITYU Clear 05/06/2014    SPECGRAV >=1 030 02/28/2023    SPECGRAV 1 010 05/06/2014    PHUR 6 0 02/28/2023    PHUR 5 5 05/06/2014    LEUKOCYTESUR Negative 02/28/2023    LEUKOCYTESUR Negative 05/06/2014    NITRITE Negative 02/28/2023    NITRITE Negative 05/06/2014    PROTEINUA 100 (2+) (A) 05/06/2014    GLUCOSEU 500 (1/2%) (A) 02/28/2023    GLUCOSEU 500 (1/2%) (A) 05/06/2014    KETONESU 15 (1+) (A) 02/28/2023    KETONESU Negative 05/06/2014    BILIRUBINUR Negative 02/28/2023    BILIRUBINUR Negative 05/06/2014    BLOODU Small (A) 02/28/2023    BLOODU Trace (A) 05/06/2014   ,   Urine Culture:   Lab Results   Component Value Date    URINECX Culture too young- will reincubate 02/28/2023   ,   Wound Culure: No results found for: WOUNDCULT      Imaging:  None       VTE Pharmacologic Prophylaxis: Enoxaparin (Lovenox)  VTE Mechanical Prophylaxis: sequential compression device and foot pump applied    Current Facility-Administered Medications   Medication Dose Route Frequency   • aspirin (ECOTRIN LOW STRENGTH) EC tablet 81 mg  81 mg Oral Daily   • atorvastatin (LIPITOR) tablet 40 mg  40 mg Oral Daily   • budesonide-formoterol (SYMBICORT) 160-4 5 mcg/act inhaler 1 puff 1 puff Inhalation BID   • enoxaparin (LOVENOX) subcutaneous injection 60 mg  60 mg Subcutaneous Daily   • ferrous sulfate tablet 325 mg  325 mg Oral BID With Meals   • insulin glargine (LANTUS) subcutaneous injection 10 Units 0 1 mL  10 Units Subcutaneous HS   • insulin lispro (HumaLOG) 100 units/mL subcutaneous injection 4-20 Units  4-20 Units Subcutaneous 4 times day   • labetalol (NORMODYNE) injection 10 mg  10 mg Intravenous Q6H PRN   • losartan (COZAAR) tablet 100 mg  100 mg Oral Daily   • metFORMIN (GLUCOPHAGE-XR) 24 hr tablet 1,000 mg  1,000 mg Oral BID With Meals   • metoprolol tartrate (LOPRESSOR) tablet 50 mg  50 mg Oral Q12H RONI   • polyethylene glycol (MIRALAX) packet 17 g  17 g Oral BID PRN   • predniSONE tablet 4 mg  4 mg Oral Daily         PPX:   Diet: carb controlled   Code Status: level 1 full code   Dispo: continue current level of care    Plan D/W Dr Aby Steven and [facility] Team      Sunday Sim, [address]  [facility] Medicine Resident PGY2

## 2023-03-02 NOTE — PLAN OF CARE
Problem: OCCUPATIONAL THERAPY ADULT  Goal: Performs self-care activities at highest level of function for planned discharge setting  See evaluation for individualized goals  Description: Treatment Interventions: ADL retraining, Visual perceptual retraining, Functional transfer training, Endurance training, Cognitive reorientation, Patient/family training, Equipment evaluation/education, Continued evaluation, Activityengagement          See flowsheet documentation for full assessment, interventions and recommendations  Note: Limitation: Decreased ADL status, Decreased cognition, Decreased endurance, Decreased self-care trans, Decreased high-level ADLs  Prognosis: Fair  Assessment: Pt is a 57 y o  female admitted to [facility] on 2/28/2023 w/ weakness progressively worsening generalized weakness after a fall at home  Pt diagnosed w/ cough, b/l LE edema, homelessness, abnormal urinalysis, obstructive sleep apnea, stage 3 CKD, polymyalgia rheumatica, HTN, DM, cerebral aneurysm, mild asthma, prolonged QT interval, hyperlipidemia, anemia, aortic dilatation, weakness  Pt  has a past medical history of Anemia, Arthritis, Benign hypertension, Diabetes mellitus ([facility] ), Diabetes mellitus type 2 with complications, uncontrolled (9/8/2015), Dyspnea on exertion, Hyperlipidemia, Hypertension, Legally blind (2010), Mild intermittent asthma with exacerbation (8/4/2018), Miscarriage, Polymyalgia rheumatica ([facility] ) (11/24/2021), Seizures ([facility] ), Sickle cell trait ([facility] ), Sleep apnea, Stage 3a chronic kidney disease ([facility] ) (5/19/2022), and Thyroid nodule (3/6/2020)  Pt with active OT orders and up and OOB as tolerated orders  Pt precautions include cognitive, chair/bed alarm, multiple lines, and fall risk  Pt currently lives with her son and is currently homeless  She requires assistance with ADLs, IADLs, does not drive and uses a w/c/RW/cane for functional mobility       Patient participated in OT evaluation and answered all the questions while A&Ox4  Patient participated in bed mobility with ModA, functional transfers sit<>stand ModA, and functional mobility ModA w/ RW  Pt is limited at this time 2*: endurance, activity tolerance, functional mobility, balance, functional standing tolerance, unsupportive home environment, decreased I w/ ADLS/IADLS and cognitive impairments  The following Occupational Performance Areas to address include: grooming, bathing/shower, toilet hygiene, dressing, medication management, health maintenance, functional mobility, community mobility and clothing management  From OT standpoint, anticipate d/c inpatient rehab   Pt to continue to benefit from acute immediate OT services to address the following goals 2-3x/week to  w/in 10-14 days:  The patient's raw score on the AM-PAC Daily Activity Inpatient Short Form is 15  A raw score of less than 19 suggests the patient may benefit from discharge to post-acute rehabilitation services  Please refer to the recommendation of the Occupational Therapist for safe discharge planning       OT Discharge Recommendation: Post acute rehabilitation services

## 2023-03-02 NOTE — PLAN OF CARE
Problem: PHYSICAL THERAPY ADULT  Goal: Performs mobility at highest level of function for planned discharge setting  See evaluation for individualized goals  Description: Treatment/Interventions: Functional transfer training, LE strengthening/ROM, Patient/family training, Equipment eval/education, Bed mobility, Gait training, Spoke to nursing, Spoke to case management, OT          See flowsheet documentation for full assessment, interventions and recommendations  Note: Prognosis: Good  Problem List: Decreased strength, Decreased endurance, Impaired balance, Decreased mobility  Assessment: Pt is a 57 y o  female seen for PT evaluation s/p admit to [facility] on 2/28/2023  Pt was admitted with a primary dx of: weakness s/p fall  PT now consulted for assessment of mobility and d/c needs  Pt with Up and OOB as tolerated  orders  Pts current comorbidities effecting treatment include: aortic dilation, hyperlipidemia, obesity, asthma, DM, cerebral aneursym, b/l LE edema  Pts current clinical presentation is Unstable/ Unpredictable (high complexity) due to Ongoing medical management for primary dx, Increased reliance on more restrictive AD compared to baseline, Decreased activity tolerance compared to baseline, Fall risk, Increased assistance needed from caregiver at current time, Trending lab values, Continuous pulse oximetry monitoring   Prior to admission, pt was residing at homeless shelter, currently looking for apartment, independent with ADLs however having increased difficulty  Upon evaluation, pt currently is requiring mod A for bed mobility; mod Ax2 for transfers; min Ax2 for ambulation 3 ft w/ RW   Pt presents at PT eval functioning below baseline and currently w/ overall mobility deficits 2* to: BLE weakness, decreased ROM, impaired balance, decreased endurance, impaired coordination, gait deviations, decreased activity tolerance compared to baseline, decreased functional mobility tolerance compared to baseline, fall risk  Pt currently at a fall risk 2* to impairments listed above  Pt will continue to benefit from skilled acute PT interventions to address stated impairments; to maximize functional mobility; for ongoing pt/ family training; and DME needs  At conclusion of PT session pt returned back in chair and chair alarm engaged with phone and call bell within reach  Pt denies any further questions at this time  The patient's AM-PAC Basic Mobility Inpatient Short Form Raw Score is 9  A Raw score of less than or equal to 16 suggests the patient may benefit from discharge to post-acute rehabilitation services  Please also refer to the recommendation of the Physical Therapist for safe discharge planning  Recommend STR upon hospital D/C  PT Discharge Recommendation: Post acute rehabilitation services    See flowsheet documentation for full assessment

## 2023-03-02 NOTE — PLAN OF CARE
Problem: PAIN - ADULT  Goal: Verbalizes/displays adequate comfort level or baseline comfort level  Description: Interventions:  - Encourage patient to monitor pain and request assistance  - Assess pain using appropriate pain scale  - Administer analgesics based on type and severity of pain and evaluate response  - Implement non-pharmacological measures as appropriate and evaluate response  - Consider cultural and social influences on pain and pain management  - Notify physician/advanced practitioner if interventions unsuccessful or patient reports new pain  Outcome: Progressing     Problem: INFECTION - ADULT  Goal: Absence or prevention of progression during hospitalization  Description: INTERVENTIONS:  - Assess and monitor for signs and symptoms of infection  - Monitor lab/diagnostic results  - Monitor all insertion sites, i e  indwelling lines, tubes, and drains  - Monitor endotracheal if appropriate and nasal secretions for changes in amount and color  - [place] appropriate cooling/warming therapies per order  - Administer medications as ordered  - Instruct and encourage patient and family to use good hand hygiene technique  - Identify and instruct in appropriate isolation precautions for identified infection/condition  Outcome: Progressing  Goal: Absence of fever/infection during neutropenic period  Description: INTERVENTIONS:  - Monitor WBC    Outcome: Progressing     Problem: SAFETY ADULT  Goal: Patient will remain free of falls  Description: INTERVENTIONS:  - Educate patient/family on patient safety including physical limitations  - Instruct patient to call for assistance with activity   - Consult OT/PT to assist with strengthening/mobility   - Keep Call bell within reach  - Keep bed low and locked with side rails adjusted as appropriate  - Keep care items and personal belongings within reach  - Initiate and maintain comfort rounds  - Make Fall Risk Sign visible to staff  - Offer Toileting every    Hours, in advance of need  - Initiate/Maintain   alarm  - Obtain necessary fall risk management equipment:     - Apply yellow socks and bracelet for high fall risk patients  - Consider moving patient to room near nurses station  Outcome: Progressing  Goal: Maintain or return to baseline ADL function  Description: INTERVENTIONS:  -  Assess patient's ability to carry out ADLs; assess patient's baseline for ADL function and identify physical deficits which impact ability to perform ADLs (bathing, care of mouth/teeth, toileting, grooming, dressing, etc )  - Assess/evaluate cause of self-care deficits   - Assess range of motion  - Assess patient's mobility; develop plan if impaired  - Assess patient's need for assistive devices and provide as appropriate  - Encourage maximum independence but intervene and supervise when necessary  - Involve family in performance of ADLs  - Assess for home care needs following discharge   - Consider OT consult to assist with ADL evaluation and planning for discharge  - Provide patient education as appropriate  Outcome: Progressing  Goal: Maintains/Returns to pre admission functional level  Description: INTERVENTIONS:  - Perform BMAT or MOVE assessment daily    - Set and communicate daily mobility goal to care team and patient/family/caregiver  - Collaborate with rehabilitation services on mobility goals if consulted  - Perform Range of Motion    times a day  - Reposition patient every    hours    - Dangle patient      times a day  - Stand patient times a day  - Ambulate patient  times a day  - Out of bed to chair  times a day   - Out of bed for meals    times a day  - Out of bed for toileting  - Record patient progress and toleration of activity level   Outcome: Progressing     Problem: DISCHARGE PLANNING  Goal: Discharge to home or other facility with appropriate resources  Description: INTERVENTIONS:  - Identify barriers to discharge w/patient and caregiver  - Arrange for needed discharge resources and transportation as appropriate  - Identify discharge learning needs (meds, wound care, etc )  - Arrange for interpretive services to assist at discharge as needed  - Refer to Case Management Department for coordinating discharge planning if the patient needs post-hospital services based on physician/advanced practitioner order or complex needs related to functional status, cognitive ability, or social support system  Outcome: Progressing     Problem: Knowledge Deficit  Goal: Patient/family/caregiver demonstrates understanding of disease process, treatment plan, medications, and discharge instructions  Description: Complete learning assessment and assess knowledge base    Interventions:  - Provide teaching at level of understanding  - Provide teaching via preferred learning methods  Outcome: Progressing     Problem: Prexisting or High Potential for Compromised Skin Integrity  Goal: Skin integrity is maintained or improved  Description: INTERVENTIONS:  - Identify patients at risk for skin breakdown  - Assess and monitor skin integrity  - Assess and monitor nutrition and hydration status  - Monitor labs   - Assess for incontinence   - Turn and reposition patient  - Assist with mobility/ambulation  - Relieve pressure over bony prominences  - Avoid friction and shearing  - Provide appropriate hygiene as needed including keeping skin clean and dry  - Evaluate need for skin moisturizer/barrier cream  - Collaborate with interdisciplinary team   - Patient/family teaching  - Consider wound care consult   Outcome: Progressing     Problem: MOBILITY - ADULT  Goal: Maintain or return to baseline ADL function  Description: INTERVENTIONS:  -  Assess patient's ability to carry out ADLs; assess patient's baseline for ADL function and identify physical deficits which impact ability to perform ADLs (bathing, care of mouth/teeth, toileting, grooming, dressing, etc )  - Assess/evaluate cause of self-care deficits   - Assess range of motion  - Assess patient's mobility; develop plan if impaired  - Assess patient's need for assistive devices and provide as appropriate  - Encourage maximum independence but intervene and supervise when necessary  - Involve family in performance of ADLs  - Assess for home care needs following discharge   - Consider OT consult to assist with ADL evaluation and planning for discharge  - Provide patient education as appropriate  Outcome: Progressing  Goal: Maintains/Returns to pre admission functional level  Description: INTERVENTIONS:  - Perform BMAT or MOVE assessment daily    - Set and communicate daily mobility goal to care team and patient/family/caregiver  - Collaborate with rehabilitation services on mobility goals if consulted  - Perform Range of Motion    times a day  - Reposition patient every    hours    - Dangle patient      times a day  - Stand patient    times a day  - Ambulate patient    times a day  - Out of bed to chair    times a day   - Out of bed for meals    times a day  - Out of bed for toileting  - Record patient progress and toleration of activity level   Outcome: Progressing

## 2023-03-02 NOTE — ASSESSMENT & PLAN NOTE
Initial Hgb 11 3  - H/O chronic anemia not on home FeSO4 supplement  - 2/28/23 iron panel: Fe 40, FeSat 15%, TIBC 266, Ferritin 50    Assessment: Chronic anemia likely secondary to chronic disease vs CKD vs iron deficiency   Low suspicion for acute blood loss    Plan:  - Monitor Hgb  - Start Fe supplement  - Miralax PRN for iron-induced constipation  - Will check B12, folate    Results from last 7 days   Lab Units 03/03/23  0430 03/02/23  0437 03/01/23  0415 02/28/23  1639   HEMOGLOBIN g/dL 10 3* 9 7* 9 9* 11 3*   HEMATOCRIT % 34 4* 33 8* 33 8* 37 1

## 2023-03-03 LAB
ALBUMIN SERPL BCP-MCNC: 2.2 G/DL (ref 3.5–5)
ALP SERPL-CCNC: 128 U/L (ref 46–116)
ALT SERPL W P-5'-P-CCNC: 10 U/L (ref 12–78)
ANION GAP SERPL CALCULATED.3IONS-SCNC: 4 MMOL/L (ref 4–13)
AST SERPL W P-5'-P-CCNC: 11 U/L (ref 5–45)
BACTERIA UR CULT: ABNORMAL
BACTERIA UR CULT: ABNORMAL
BASOPHILS # BLD AUTO: 0.06 THOUSANDS/ÂΜL (ref 0–0.1)
BASOPHILS NFR BLD AUTO: 1 % (ref 0–1)
BILIRUB SERPL-MCNC: 0.31 MG/DL (ref 0.2–1)
BUN SERPL-MCNC: 10 MG/DL (ref 5–25)
CALCIUM ALBUM COR SERPL-MCNC: 10.4 MG/DL (ref 8.3–10.1)
CALCIUM SERPL-MCNC: 9 MG/DL (ref 8.3–10.1)
CHLORIDE SERPL-SCNC: 107 MMOL/L (ref 96–108)
CO2 SERPL-SCNC: 30 MMOL/L (ref 21–32)
CREAT SERPL-MCNC: 1.02 MG/DL (ref 0.6–1.3)
CRP SERPL QL: 34.7 MG/L
EOSINOPHIL # BLD AUTO: 0.33 THOUSAND/ÂΜL (ref 0–0.61)
EOSINOPHIL NFR BLD AUTO: 3 % (ref 0–6)
ERYTHROCYTE [DISTWIDTH] IN BLOOD BY AUTOMATED COUNT: 17 % (ref 11.6–15.1)
ERYTHROCYTE [SEDIMENTATION RATE] IN BLOOD: 86 MM/HOUR (ref 0–29)
GFR SERPL CREATININE-BSD FRML MDRD: 58 ML/MIN/1.73SQ M
GLUCOSE SERPL-MCNC: 180 MG/DL (ref 65–140)
GLUCOSE SERPL-MCNC: 190 MG/DL (ref 65–140)
GLUCOSE SERPL-MCNC: 258 MG/DL (ref 65–140)
GLUCOSE SERPL-MCNC: 259 MG/DL (ref 65–140)
GLUCOSE SERPL-MCNC: 270 MG/DL (ref 65–140)
GLUCOSE SERPL-MCNC: 285 MG/DL (ref 65–140)
HCT VFR BLD AUTO: 34.4 % (ref 34.8–46.1)
HGB BLD-MCNC: 10.3 G/DL (ref 11.5–15.4)
IMM GRANULOCYTES # BLD AUTO: 0.12 THOUSAND/UL (ref 0–0.2)
IMM GRANULOCYTES NFR BLD AUTO: 1 % (ref 0–2)
LYMPHOCYTES # BLD AUTO: 2.84 THOUSANDS/ÂΜL (ref 0.6–4.47)
LYMPHOCYTES NFR BLD AUTO: 24 % (ref 14–44)
MCH RBC QN AUTO: 23.6 PG (ref 26.8–34.3)
MCHC RBC AUTO-ENTMCNC: 29.9 G/DL (ref 31.4–37.4)
MCV RBC AUTO: 79 FL (ref 82–98)
MONOCYTES # BLD AUTO: 0.87 THOUSAND/ÂΜL (ref 0.17–1.22)
MONOCYTES NFR BLD AUTO: 7 % (ref 4–12)
NEUTROPHILS # BLD AUTO: 7.87 THOUSANDS/ÂΜL (ref 1.85–7.62)
NEUTS SEG NFR BLD AUTO: 64 % (ref 43–75)
NRBC BLD AUTO-RTO: 0 /100 WBCS
PLATELET # BLD AUTO: 383 THOUSANDS/UL (ref 149–390)
PMV BLD AUTO: 11.1 FL (ref 8.9–12.7)
POTASSIUM SERPL-SCNC: 3 MMOL/L (ref 3.5–5.3)
PROT SERPL-MCNC: 6.2 G/DL (ref 6.4–8.4)
RBC # BLD AUTO: 4.37 MILLION/UL (ref 3.81–5.12)
SODIUM SERPL-SCNC: 141 MMOL/L (ref 135–147)
WBC # BLD AUTO: 12.09 THOUSAND/UL (ref 4.31–10.16)

## 2023-03-03 RX ORDER — POTASSIUM CHLORIDE 20 MEQ/1
40 TABLET, EXTENDED RELEASE ORAL ONCE
Status: COMPLETED | OUTPATIENT
Start: 2023-03-03 | End: 2023-03-03

## 2023-03-03 RX ORDER — CHOLECALCIFEROL (VITAMIN D3) 125 MCG
6000 CAPSULE ORAL
Status: DISCONTINUED | OUTPATIENT
Start: 2023-03-04 | End: 2023-03-08 | Stop reason: HOSPADM

## 2023-03-03 RX ADMIN — LOSARTAN POTASSIUM 100 MG: 50 TABLET, FILM COATED ORAL at 08:12

## 2023-03-03 RX ADMIN — BUDESONIDE AND FORMOTEROL FUMARATE DIHYDRATE 1 PUFF: 160; 4.5 AEROSOL RESPIRATORY (INHALATION) at 17:24

## 2023-03-03 RX ADMIN — ASPIRIN 81 MG: 81 TABLET, COATED ORAL at 08:12

## 2023-03-03 RX ADMIN — INSULIN LISPRO 4 UNITS: 100 INJECTION, SOLUTION INTRAVENOUS; SUBCUTANEOUS at 09:32

## 2023-03-03 RX ADMIN — FERROUS SULFATE TAB 325 MG (65 MG ELEMENTAL FE) 325 MG: 325 (65 FE) TAB at 17:24

## 2023-03-03 RX ADMIN — ATORVASTATIN CALCIUM 40 MG: 40 TABLET, FILM COATED ORAL at 08:12

## 2023-03-03 RX ADMIN — METFORMIN HYDROCHLORIDE 1000 MG: 500 TABLET, EXTENDED RELEASE ORAL at 08:11

## 2023-03-03 RX ADMIN — ENOXAPARIN SODIUM 60 MG: 60 INJECTION SUBCUTANEOUS at 08:11

## 2023-03-03 RX ADMIN — FERROUS SULFATE TAB 325 MG (65 MG ELEMENTAL FE) 325 MG: 325 (65 FE) TAB at 08:12

## 2023-03-03 RX ADMIN — POTASSIUM CHLORIDE 40 MEQ: 1500 TABLET, EXTENDED RELEASE ORAL at 08:11

## 2023-03-03 RX ADMIN — PREDNISONE 4 MG: 1 TABLET ORAL at 08:12

## 2023-03-03 RX ADMIN — METOPROLOL TARTRATE 50 MG: 50 TABLET, FILM COATED ORAL at 21:38

## 2023-03-03 RX ADMIN — METFORMIN HYDROCHLORIDE 1000 MG: 500 TABLET, EXTENDED RELEASE ORAL at 17:24

## 2023-03-03 RX ADMIN — POTASSIUM CHLORIDE 40 MEQ: 1500 TABLET, EXTENDED RELEASE ORAL at 11:42

## 2023-03-03 RX ADMIN — INSULIN LISPRO 12 UNITS: 100 INJECTION, SOLUTION INTRAVENOUS; SUBCUTANEOUS at 18:42

## 2023-03-03 RX ADMIN — INSULIN LISPRO 12 UNITS: 100 INJECTION, SOLUTION INTRAVENOUS; SUBCUTANEOUS at 13:49

## 2023-03-03 RX ADMIN — INSULIN LISPRO 4 UNITS: 100 INJECTION, SOLUTION INTRAVENOUS; SUBCUTANEOUS at 08:13

## 2023-03-03 RX ADMIN — METOPROLOL TARTRATE 50 MG: 50 TABLET, FILM COATED ORAL at 08:12

## 2023-03-03 RX ADMIN — INSULIN GLARGINE 20 UNITS: 100 INJECTION, SOLUTION SUBCUTANEOUS at 21:39

## 2023-03-03 RX ADMIN — BUDESONIDE AND FORMOTEROL FUMARATE DIHYDRATE 1 PUFF: 160; 4.5 AEROSOL RESPIRATORY (INHALATION) at 08:13

## 2023-03-03 NOTE — PLAN OF CARE
Problem: INFECTION - ADULT  Goal: Absence or prevention of progression during hospitalization  Description: INTERVENTIONS:  - Assess and monitor for signs and symptoms of infection  - Monitor lab/diagnostic results  - Monitor all insertion sites, i e  indwelling lines, tubes, and drains  - Monitor endotracheal if appropriate and nasal secretions for changes in amount and color  - [place] appropriate cooling/warming therapies per order  - Administer medications as ordered  - Instruct and encourage patient and family to use good hand hygiene technique  - Identify and instruct in appropriate isolation precautions for identified infection/condition  Outcome: Progressing  Goal: Absence of fever/infection during neutropenic period  Description: INTERVENTIONS:  - Monitor WBC    Outcome: Progressing     Problem: PAIN - ADULT  Goal: Verbalizes/displays adequate comfort level or baseline comfort level  Description: Interventions:  - Encourage patient to monitor pain and request assistance  - Assess pain using appropriate pain scale  - Administer analgesics based on type and severity of pain and evaluate response  - Implement non-pharmacological measures as appropriate and evaluate response  - Consider cultural and social influences on pain and pain management  - Notify physician/advanced practitioner if interventions unsuccessful or patient reports new pain  Outcome: Progressing

## 2023-03-03 NOTE — CASE MANAGEMENT
Case Management Discharge Planning Note    Patient name Al Alarcon  Location /-46 MRN [id number]  : 1960 Date 3/3/2023       Current Admission Date: 2023  Current Admission Diagnosis:Weakness   Patient Active Problem List    Diagnosis Date Noted   • Bilateral lower extremity edema 2023   • Cough 2023   • Homelessness 2023   • Sepsis ([facility] ) 2023   • Abnormal urinalysis 2023   • Weakness 2023   • Intertrigo 2023   • Leg numbness 2023   • Unsteadiness on feet 2022   • Chronic migraine without aura, not intractable, without status migrainosus 2022   • Obstructive sleep apnea 2022   • Diabetic neuropathy ([place] [address] ) 2022   • Stage 3a chronic kidney disease ([place] [address] ) 2022   • Polymyalgia rheumatica ([place] [address] ) 2021   • Gait instability 2021   • Ulnar neuropathy of right upper extremity    • Blurry vision, bilateral 2021   • Depressed mood 10/08/2020   • Essential hypertension 09/15/2020   • Diabetes mellitus ([place] [address] ) 2020   • Chronic migraine without aura without status migrainosus, not intractable 2020   • Hypersomnia 2020   • Migraine without aura and without status migrainosus, not intractable 2020   • Cerebral aneurysm without rupture 2020   • History of absence seizures 2020   • Thyroid nodule 2020   • Aneurysm ([place] [address] ) 2020   • Type 2 diabetes mellitus with hyperglycemia, with long-term current use of insulin ([place] [address] ) 2020   • Left cavernous carotid aneurysm 02/10/2020   • Polyneuropathy associated with underlying disease ([place] [address] ) 2020   • PMR (polymyalgia rheumatica) ([facility] ) 2020   • Thrombocytosis 2020   • Morbid obesity ([place] [address] ) 2019   • Other inflammatory and immune myopathies, not elsewhere classified 2019   • Transaminitis 2019   • Frequent headaches 2019   • Type 2 diabetes mellitus with microalbuminuria, with long-term current use of insulin ([facility] ) 02/01/2019   • Mild intermittent asthma without complication 74/56/0038   • Orthostatic hypotension 06/25/2018   • Lung nodules 03/22/2018   • Exertional dyspnea 02/13/2018   • Enlarged pulmonary artery ([facility]) 02/13/2018   • Aortic dilatation ([facility]CC) 02/13/2018   • 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, with long-term current use of insulin    • Seizures ([facility] )    • Obstructive sleep apnea (adult) (pediatric) 12/18/2017   • Prolonged QT interval 12/18/2017   • Decreased pulses in feet 12/11/2017   • Microalbuminuria 09/08/2015   • Vitamin D deficiency 06/06/2014   • Visual impairment in both eyes 12/06/2012   • Anemia 03/20/2012   • Hyperlipidemia 03/20/2012   • Class 3 severe obesity with serious comorbidity and body mass index (BMI) of 50 0 to 59 9 in adult [facility]) 03/20/2012   • Neuropathy of hand, right 03/20/2012      LOS (days): 2  Geometric Mean LOS (GMLOS) (days): 2 90  Days to GMLOS:0 9     OBJECTIVE:  Risk of Unplanned Readmission Score: 23 45         Current admission status: Inpatient   Preferred Pharmacy:   [id number] [place] [address]  Phone: [phone] Fax: [phone]    [facility] ([address] [place] [address],   [address] [place] [id number]  Phone: [phone] Fax: [phone]    Primary Care Provider: Lenny Coto MD    Primary Insurance: [facility] [address] [facility] REP  Secondary Insurance:     DISCHARGE DETAILS:           Pt agreeable to STR blanket referrals sent  Pt given homeless resources on 3/3   Told pina and her son to follow up with HANNAH Leigh or Jaspreet

## 2023-03-03 NOTE — PROGRESS NOTES
[facility]  Progress Note - Toni Urban 1960, 57 y o  female MRN: [id number]  Unit/Bed#: -01 Encounter: [id number]  Primary Care Provider: Jeremy Amor MD   Date and time admitted to hospital: 2/28/2023  3:13 PM    Diabetes mellitus [facility])  Assessment & Plan  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  - Average BG last 72 hrs: (P) 295 [id number]     Plan:  - Hold home insulin for now, consider resume on discharge  - Start ISS for tighter BG control as patient resumed on home prednisone, ISS alg 5  - lantus 20u qhs  - CHO ctrl diet  - Recommend close follow-up with endocrinologist outpatient    Results from last 7 days   Lab Units 03/03/23  0601 03/02/23  2111 03/02/23  1550 03/02/23  1412 03/02/23  1001 03/02/23  0600 03/01/23  2220   POC GLUCOSE mg/dl 180* 275* 278* 319* 323* 321* 314*       * Weakness  Assessment & Plan  Patient reports progressively worsening generalized weakness started several month ago and was recently evicted from her apartment early Feb 2023 after sustaining a fall at home  Patient states she has difficulty ambulating, currently wheelchair dependent, and also has difficulty with standing up and sitting down from the toilet  - 2/28/23 Prealbumin 10 3 (low)  - Initial exam: 3/5 strength bilateral LEs and 4/5 strength bilateral UEs  No focal deficits  CN II-XII grossly intact  - 3/1 Rapid Response:slid out of bed and landed on bottom  No headstrike    - 3/2 PTOT: Post acute rehabilitation services    Assessment: Generalized weakness likely multifactorial in setting of nutrition, chronic diseases, and comorbidities      Plan:  - Nutrition supplement with Glucerna 3 times daily with meals  - Fall precaution  - CM consulted for STR placement per PT/OT rec    Polymyalgia rheumatica ([address] )  Assessment & Plan  H/O polymyalgia rheumatica  - Patient reports has not been taking any of her medication for 1-2 weeks due to not poor diet and not feeling well  - Home regimen: Prednisone 4mg daily (became immoble on 2mg dose due to severe aches/pains)  - Follows outpatient neurology (last visit 6/9/22) and rheumatology (last visit 2/8/22)    Plan:  - Resume home medication  - Monitor BG    Essential hypertension  Assessment & Plan  Initial /79  - Patient reports has not been taking any of her medication for 1-2 weeks due to not poor diet and not feeling well  - Home regimen: Losartan 100 mg daily, metoprolol tartrate 50 mg twice daily  - Follows cardiology outpatient, last visit 4/27/22    Assessment:  - Poorly controlled BP on admission, likely due to not taking her medications  - Goal BP < 140/90  - Most recent Blood Pressure: 241/66   - Systolic (19JMR), ZQP:918 , Min:130 , WEM:863   - Diastolic (13UOE), SBY:43, Min:64, Max:79    Plan:  - Resume home meds  - Labetalol 10 mg Q6H PRN for SBP > 180  - Vitals per unit routine    Bilateral lower extremity edema  Assessment & Plan  Please see detailed A&P under "Acute cough"      Abnormal urinalysis  Assessment & Plan  H/O abnormal UA with negative urine culture in the past  - Patient denies any abdominal pain or dysuria  - 2/28/23 UA: >300 protein, 500 glucose, 1+ ketone, small blood  - 2/28/23 Urine micro: innumerable RBC and WBC  - 2/28/23 UCX in process     Plan:  - Monitor clinically off ABX   - Follow-up UCX and initiate ABX if culture positive    Results from last 7 days   Lab Units 02/28/23 2010   URINE CULTURE  Culture too young- will reincubate       Anemia  Assessment & Plan  Initial Hgb 11 3  - H/O chronic anemia not on home FeSO4 supplement  - 2/28/23 iron panel: Fe 40, FeSat 15%, TIBC 266, Ferritin 50    Assessment: Chronic anemia likely secondary to chronic disease vs CKD vs iron deficiency  Low suspicion for acute blood loss    Plan:  - Monitor Hgb  - Start Fe supplement  - Miralax PRN for iron-induced constipation  - Will check B12, folate    Results from last 7 days   Lab Units 03/03/23  0430 03/02/23  0437 03/01/23  0415 02/28/23  1639   HEMOGLOBIN g/dL 10 3* 9 7* 9 9* 11 3*   HEMATOCRIT % 34 4* 33 8* 33 8* 37 1         Subjective:   No acute events overnight  Pt seen and examined at bedside this AM, sitting comfortably eating breakfast  States she is not sleeping very well and feels groggy this morning, and "needs some time to get her brain going " Denies any other acute complaints at this time  She is concerned about where she will go after discharge from the hospital     Objective:     Vitals: Blood pressure 130/64, pulse 82, temperature 98 8 °F (37 1 °C), resp  rate 18, height 5' 8" (1 727 m), weight (!) 152 kg (335 lb), SpO2 92 %, not currently breastfeeding  ,Body mass index is 50 94 kg/m²  Intake/Output Summary (Last 24 hours) at 3/3/2023 0719  Last data filed at 3/3/2023 0445  Gross per 24 hour   Intake 1181 ml   Output 800 ml   Net 381 ml       Physical Exam:   Physical Exam  Constitutional:       General: She is not in acute distress  Appearance: Normal appearance  She is obese  She is not ill-appearing or toxic-appearing  HENT:      Right Ear: External ear normal       Left Ear: External ear normal       Mouth/Throat:      Mouth: Mucous membranes are moist       Pharynx: Oropharynx is clear  Eyes:      General: No scleral icterus  Conjunctiva/sclera: Conjunctivae normal    Cardiovascular:      Rate and Rhythm: Normal rate and regular rhythm  Heart sounds: Normal heart sounds  No murmur heard  Pulmonary:      Effort: Pulmonary effort is normal  No respiratory distress        Breath sounds: Normal breath sounds  No wheezing, rhonchi or rales  Abdominal:      General: Bowel sounds are normal       Palpations: Abdomen is soft  Tenderness: There is no abdominal tenderness  There is no guarding  Musculoskeletal:      Right lower leg: Edema (2+ pitting) present  Left lower leg: Edema (2+ pitting) present  Skin:     General: Skin is warm and dry  Coloration: Skin is not jaundiced  Findings: Rash (venous stasis dermatitis bilateral lower extremities) present  Neurological:      General: No focal deficit present  Mental Status: She is alert and oriented to person, place, and time  Psychiatric:         Mood and Affect: Mood normal          Behavior: Behavior normal          Invasive Devices     Peripheral Intravenous Line  Duration           Peripheral IV 02/28/23 Left Antecubital 2 days                           Lab and other studies:  I have personally reviewed pertinent reports  No results displayed because visit has over 200 results            Recent Results (from the past 24 hour(s))   Fingerstick Glucose (POCT)    Collection Time: 03/02/23 10:01 AM   Result Value Ref Range    POC Glucose 323 (H) 65 - 140 mg/dl   Fingerstick Glucose (POCT)    Collection Time: 03/02/23  2:12 PM   Result Value Ref Range    POC Glucose 319 (H) 65 - 140 mg/dl   Fingerstick Glucose (POCT)    Collection Time: 03/02/23  3:50 PM   Result Value Ref Range    POC Glucose 278 (H) 65 - 140 mg/dl   Fingerstick Glucose (POCT)    Collection Time: 03/02/23  9:11 PM   Result Value Ref Range    POC Glucose 275 (H) 65 - 140 mg/dl   CBC and differential    Collection Time: 03/03/23  4:30 AM   Result Value Ref Range    WBC 12 09 (H) 4 31 - 10 16 Thousand/uL    RBC 4 37 3 81 - 5 12 Million/uL    Hemoglobin 10 3 (L) 11 5 - 15 4 g/dL    Hematocrit 34 4 (L) 34 8 - 46 1 %    MCV 79 (L) 82 - 98 fL    MCH 23 6 (L) 26 8 - 34 3 pg    MCHC 29 9 (L) 31 4 - 37 4 g/dL    RDW 17 0 (H) 11 6 - 15 1 %    MPV 11 1 8 9 - 12 7 fL    Platelets 394 887 - 575 Thousands/uL    nRBC 0 /100 WBCs    Neutrophils Relative 64 43 - 75 %    Immat GRANS % 1 0 - 2 %    Lymphocytes Relative 24 14 - 44 %    Monocytes Relative 7 4 - 12 %    Eosinophils Relative 3 0 - 6 %    Basophils Relative 1 0 - 1 %    Neutrophils Absolute 7 87 (H) 1 85 - 7 62 Thousands/µL    Immature Grans Absolute 0 12 0 00 - 0 20 Thousand/uL    Lymphocytes Absolute 2 84 0 60 - 4 47 Thousands/µL    Monocytes Absolute 0 87 0 17 - 1 22 Thousand/µL    Eosinophils Absolute 0 33 0 00 - 0 61 Thousand/µL    Basophils Absolute 0 06 0 00 - 0 10 Thousands/µL   Comprehensive metabolic panel    Collection Time: 03/03/23  4:30 AM   Result Value Ref Range    Sodium 141 135 - 147 mmol/L    Potassium 3 0 (L) 3 5 - 5 3 mmol/L    Chloride 107 96 - 108 mmol/L    CO2 30 21 - 32 mmol/L    ANION GAP 4 4 - 13 mmol/L    BUN 10 5 - 25 mg/dL    Creatinine 1 02 0 60 - 1 30 mg/dL    Glucose 190 (H) 65 - 140 mg/dL    Calcium 9 0 8 3 - 10 1 mg/dL    Corrected Calcium 10 4 (H) 8 3 - 10 1 mg/dL    AST 11 5 - 45 U/L    ALT 10 (L) 12 - 78 U/L    Alkaline Phosphatase 128 (H) 46 - 116 U/L    Total Protein 6 2 (L) 6 4 - 8 4 g/dL    Albumin 2 2 (L) 3 5 - 5 0 g/dL    Total Bilirubin 0 31 0 20 - 1 00 mg/dL    eGFR 58 ml/min/1 73sq m   Fingerstick Glucose (POCT)    Collection Time: 03/03/23  6:01 AM   Result Value Ref Range    POC Glucose 180 (H) 65 - 140 mg/dl     Blood Culture:   Lab Results   Component Value Date    BLOODCX No Growth After 5 Days   02/11/2023   ,   Urinalysis:   Lab Results   Component Value Date    COLORU  02/28/2023      Comment:      refer to results    COLORU Yellow 02/28/2023    COLORU Yellow 05/06/2014    CLARITYU Clear 02/28/2023    CLARITYU Clear 05/06/2014    SPECGRAV >=1 030 02/28/2023    SPECGRAV 1 010 05/06/2014    PHUR 6 0 02/28/2023    PHUR 5 5 05/06/2014    LEUKOCYTESUR Negative 02/28/2023    LEUKOCYTESUR Negative 05/06/2014    NITRITE Negative 02/28/2023    NITRITE Negative 05/06/2014 PROTEINUA 100 (2+) (A) 05/06/2014    GLUCOSEU 500 (1/2%) (A) 02/28/2023    GLUCOSEU 500 (1/2%) (A) 05/06/2014    KETONESU 15 (1+) (A) 02/28/2023    KETONESU Negative 05/06/2014    BILIRUBINUR Negative 02/28/2023    BILIRUBINUR Negative 05/06/2014    BLOODU Small (A) 02/28/2023    BLOODU Trace (A) 05/06/2014   ,   Urine Culture:   Lab Results   Component Value Date    URINECX Culture too young- will reincubate 02/28/2023   ,   Wound Culure: No results found for: WOUNDCULT      Imaging:  None       VTE Pharmacologic Prophylaxis: Heparin  VTE Mechanical Prophylaxis: sequential compression device and foot pump applied    Current Facility-Administered Medications   Medication Dose Route Frequency   • aspirin (ECOTRIN LOW STRENGTH) EC tablet 81 mg  81 mg Oral Daily   • atorvastatin (LIPITOR) tablet 40 mg  40 mg Oral Daily   • budesonide-formoterol (SYMBICORT) 160-4 5 mcg/act inhaler 1 puff  1 puff Inhalation BID   • enoxaparin (LOVENOX) subcutaneous injection 60 mg  60 mg Subcutaneous Daily   • ferrous sulfate tablet 325 mg  325 mg Oral BID With Meals   • insulin glargine (LANTUS) subcutaneous injection 20 Units 0 2 mL  20 Units Subcutaneous HS   • insulin lispro (HumaLOG) 100 units/mL subcutaneous injection 4-20 Units  4-20 Units Subcutaneous 4 times day   • labetalol (NORMODYNE) injection 10 mg  10 mg Intravenous Q6H PRN   • losartan (COZAAR) tablet 100 mg  100 mg Oral Daily   • metFORMIN (GLUCOPHAGE-XR) 24 hr tablet 1,000 mg  1,000 mg Oral BID With Meals   • metoprolol tartrate (LOPRESSOR) tablet 50 mg  50 mg Oral Q12H RONI   • polyethylene glycol (MIRALAX) packet 17 g  17 g Oral BID PRN   • potassium chloride (K-DUR,KLOR-CON) CR tablet 40 mEq  40 mEq Oral Once   • potassium chloride (K-DUR,KLOR-CON) CR tablet 40 mEq  40 mEq Oral Once   • predniSONE tablet 4 mg  4 mg Oral Daily           Diet: Carb controlled level 1  Code Status: level 1 full code  Dispo: continue current level of care    Plan D/W Dr Louie and [facility] Team      Ariane Costa, Elvira Alcala  Family Medicine Resident PGY2

## 2023-03-04 PROBLEM — E11.69 TYPE 2 DIABETES MELLITUS WITH OTHER SPECIFIED COMPLICATION (HCC): Status: ACTIVE | Noted: 2020-09-08

## 2023-03-04 LAB
ALBUMIN SERPL BCP-MCNC: 2.2 G/DL (ref 3.5–5)
ALP SERPL-CCNC: 128 U/L (ref 46–116)
ALT SERPL W P-5'-P-CCNC: 12 U/L (ref 12–78)
ANION GAP SERPL CALCULATED.3IONS-SCNC: 6 MMOL/L (ref 4–13)
AST SERPL W P-5'-P-CCNC: 15 U/L (ref 5–45)
BASOPHILS # BLD AUTO: 0.07 THOUSANDS/ÂΜL (ref 0–0.1)
BASOPHILS NFR BLD AUTO: 1 % (ref 0–1)
BILIRUB SERPL-MCNC: 0.41 MG/DL (ref 0.2–1)
BUN SERPL-MCNC: 11 MG/DL (ref 5–25)
CALCIUM ALBUM COR SERPL-MCNC: 10.7 MG/DL (ref 8.3–10.1)
CALCIUM SERPL-MCNC: 9.3 MG/DL (ref 8.3–10.1)
CHLORIDE SERPL-SCNC: 108 MMOL/L (ref 96–108)
CO2 SERPL-SCNC: 29 MMOL/L (ref 21–32)
CORTIS AM PEAK SERPL-MCNC: 5.2 UG/DL (ref 4.2–22.4)
CREAT SERPL-MCNC: 0.96 MG/DL (ref 0.6–1.3)
EOSINOPHIL # BLD AUTO: 0.36 THOUSAND/ÂΜL (ref 0–0.61)
EOSINOPHIL NFR BLD AUTO: 3 % (ref 0–6)
ERYTHROCYTE [DISTWIDTH] IN BLOOD BY AUTOMATED COUNT: 17.1 % (ref 11.6–15.1)
GFR SERPL CREATININE-BSD FRML MDRD: 62 ML/MIN/1.73SQ M
GLUCOSE SERPL-MCNC: 150 MG/DL (ref 65–140)
GLUCOSE SERPL-MCNC: 162 MG/DL (ref 65–140)
GLUCOSE SERPL-MCNC: 237 MG/DL (ref 65–140)
GLUCOSE SERPL-MCNC: 237 MG/DL (ref 65–140)
GLUCOSE SERPL-MCNC: 247 MG/DL (ref 65–140)
GLUCOSE SERPL-MCNC: 322 MG/DL (ref 65–140)
HCT VFR BLD AUTO: 35.2 % (ref 34.8–46.1)
HGB BLD-MCNC: 10.5 G/DL (ref 11.5–15.4)
IMM GRANULOCYTES # BLD AUTO: 0.08 THOUSAND/UL (ref 0–0.2)
IMM GRANULOCYTES NFR BLD AUTO: 1 % (ref 0–2)
LYMPHOCYTES # BLD AUTO: 3.13 THOUSANDS/ÂΜL (ref 0.6–4.47)
LYMPHOCYTES NFR BLD AUTO: 27 % (ref 14–44)
MCH RBC QN AUTO: 23.4 PG (ref 26.8–34.3)
MCHC RBC AUTO-ENTMCNC: 29.8 G/DL (ref 31.4–37.4)
MCV RBC AUTO: 79 FL (ref 82–98)
MONOCYTES # BLD AUTO: 0.87 THOUSAND/ÂΜL (ref 0.17–1.22)
MONOCYTES NFR BLD AUTO: 8 % (ref 4–12)
NEUTROPHILS # BLD AUTO: 7.11 THOUSANDS/ÂΜL (ref 1.85–7.62)
NEUTS SEG NFR BLD AUTO: 60 % (ref 43–75)
NRBC BLD AUTO-RTO: 0 /100 WBCS
PHOSPHATE SERPL-MCNC: 2.9 MG/DL (ref 2.3–4.1)
PLATELET # BLD AUTO: 424 THOUSANDS/UL (ref 149–390)
PMV BLD AUTO: 11.7 FL (ref 8.9–12.7)
POTASSIUM SERPL-SCNC: 3.3 MMOL/L (ref 3.5–5.3)
PROT SERPL-MCNC: 6.4 G/DL (ref 6.4–8.4)
PTH-INTACT SERPL-MCNC: 30.8 PG/ML (ref 18.4–80.1)
RBC # BLD AUTO: 4.48 MILLION/UL (ref 3.81–5.12)
SODIUM SERPL-SCNC: 143 MMOL/L (ref 135–147)
WBC # BLD AUTO: 11.62 THOUSAND/UL (ref 4.31–10.16)

## 2023-03-04 RX ORDER — POTASSIUM CHLORIDE 20 MEQ/1
40 TABLET, EXTENDED RELEASE ORAL ONCE
Status: COMPLETED | OUTPATIENT
Start: 2023-03-04 | End: 2023-03-04

## 2023-03-04 RX ORDER — FLUCONAZOLE 150 MG/1
150 TABLET ORAL ONCE
Status: COMPLETED | OUTPATIENT
Start: 2023-03-04 | End: 2023-03-04

## 2023-03-04 RX ORDER — POTASSIUM CHLORIDE 20 MEQ/1
20 TABLET, EXTENDED RELEASE ORAL ONCE
Status: DISCONTINUED | OUTPATIENT
Start: 2023-03-04 | End: 2023-03-04

## 2023-03-04 RX ADMIN — Medication 6000 UNITS: at 11:19

## 2023-03-04 RX ADMIN — ATORVASTATIN CALCIUM 40 MG: 40 TABLET, FILM COATED ORAL at 09:06

## 2023-03-04 RX ADMIN — METOPROLOL TARTRATE 50 MG: 50 TABLET, FILM COATED ORAL at 09:07

## 2023-03-04 RX ADMIN — BUDESONIDE AND FORMOTEROL FUMARATE DIHYDRATE 1 PUFF: 160; 4.5 AEROSOL RESPIRATORY (INHALATION) at 18:09

## 2023-03-04 RX ADMIN — INSULIN LISPRO 4 UNITS: 100 INJECTION, SOLUTION INTRAVENOUS; SUBCUTANEOUS at 09:08

## 2023-03-04 RX ADMIN — PREDNISONE 4 MG: 1 TABLET ORAL at 09:07

## 2023-03-04 RX ADMIN — FERROUS SULFATE TAB 325 MG (65 MG ELEMENTAL FE) 325 MG: 325 (65 FE) TAB at 09:08

## 2023-03-04 RX ADMIN — LOSARTAN POTASSIUM 100 MG: 50 TABLET, FILM COATED ORAL at 09:06

## 2023-03-04 RX ADMIN — Medication 6000 UNITS: at 09:07

## 2023-03-04 RX ADMIN — ENOXAPARIN SODIUM 60 MG: 60 INJECTION SUBCUTANEOUS at 09:06

## 2023-03-04 RX ADMIN — BUDESONIDE AND FORMOTEROL FUMARATE DIHYDRATE 1 PUFF: 160; 4.5 AEROSOL RESPIRATORY (INHALATION) at 09:08

## 2023-03-04 RX ADMIN — INSULIN LISPRO 8 UNITS: 100 INJECTION, SOLUTION INTRAVENOUS; SUBCUTANEOUS at 13:11

## 2023-03-04 RX ADMIN — ASPIRIN 81 MG: 81 TABLET, COATED ORAL at 09:07

## 2023-03-04 RX ADMIN — INSULIN LISPRO 12 UNITS: 100 INJECTION, SOLUTION INTRAVENOUS; SUBCUTANEOUS at 10:25

## 2023-03-04 RX ADMIN — METOPROLOL TARTRATE 50 MG: 50 TABLET, FILM COATED ORAL at 21:05

## 2023-03-04 RX ADMIN — INSULIN LISPRO 8 UNITS: 100 INJECTION, SOLUTION INTRAVENOUS; SUBCUTANEOUS at 18:09

## 2023-03-04 RX ADMIN — METFORMIN HYDROCHLORIDE 1000 MG: 500 TABLET, EXTENDED RELEASE ORAL at 16:05

## 2023-03-04 RX ADMIN — Medication 6000 UNITS: at 16:05

## 2023-03-04 RX ADMIN — METFORMIN HYDROCHLORIDE 1000 MG: 500 TABLET, EXTENDED RELEASE ORAL at 09:06

## 2023-03-04 RX ADMIN — FLUCONAZOLE 150 MG: 150 TABLET ORAL at 11:19

## 2023-03-04 RX ADMIN — INSULIN GLARGINE 20 UNITS: 100 INJECTION, SOLUTION SUBCUTANEOUS at 21:05

## 2023-03-04 RX ADMIN — FERROUS SULFATE TAB 325 MG (65 MG ELEMENTAL FE) 325 MG: 325 (65 FE) TAB at 16:05

## 2023-03-04 RX ADMIN — POTASSIUM CHLORIDE 40 MEQ: 1500 TABLET, EXTENDED RELEASE ORAL at 10:23

## 2023-03-04 NOTE — ASSESSMENT & PLAN NOTE
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 as well as microalbuminuria   Worsening by daily Prednisone usage for PMR   - BS range after 20 u lantus started 150-162  - BS at goal     Plan:  - Hold home insulin for now, consider resume on discharge  - Start ISS for tighter BG control as patient resumed on home prednisone, ISS alg 5  - lantus increased to 30u qhs on 3/7  - CHO ctrl diet  - Recommend close follow-up with endocrinologist outpatient    Results from last 7 days   Lab Units 03/08/23  0631 03/07/23  2100 03/07/23  1807 03/07/23  1532 03/07/23  1153 03/07/23  0947 03/07/23  0616   POC GLUCOSE mg/dl 164* 220* 211* 239* 224* 205* 136

## 2023-03-04 NOTE — ASSESSMENT & PLAN NOTE
Patient reports both her and her son have been homelessness since Feb 2023 after eviction from her apartment    - CM consulted for social support - sending STR blanket referral, local [employer] [facility] contact given

## 2023-03-04 NOTE — ASSESSMENT & PLAN NOTE
H/O abnormal UA with negative urine culture in the past  - Patient denies any abdominal pain or dysuria  - 2/28/23 UA: >300 protein, 500 glucose, 1+ ketone, small blood  - 2/28/23 Urine micro: innumerable RBC and WBC  - 2/28/23 > 100K of Candida species    Plan:  - Monitor clinically off ABX for asymptomatic bacteruria       Results from last 7 days   Lab Units 02/28/23 2010   URINE CULTURE  >100,000 cfu/ml Candida glabrata*  30,000-39,000 cfu/ml

## 2023-03-04 NOTE — PLAN OF CARE
Problem: PHYSICAL THERAPY ADULT  Goal: Performs mobility at highest level of function for planned discharge setting  See evaluation for individualized goals  Description: Treatment/Interventions: Functional transfer training, LE strengthening/ROM, Patient/family training, Equipment eval/education, Bed mobility, Gait training, Spoke to nursing, Spoke to case management, OT          See flowsheet documentation for full assessment, interventions and recommendations  Outcome: Progressing  Note: Prognosis: Good  Problem List: Decreased strength, Decreased endurance, Impaired balance, Decreased mobility  Assessment: The patient continues to require assistance for transfers, but she was able to ambulate short distances today  She required assistance to stand from the toilet, and a bariatric commode was provided for future use  She was able to stand for several minutes while being cleaned  The patient demonstrated the ability to manuever around obstacles with increased time as well  She will benefit from continued therapy in order to facilitate her return to independence  PT Discharge Recommendation: Post acute rehabilitation services    See flowsheet documentation for full assessment

## 2023-03-04 NOTE — ASSESSMENT & PLAN NOTE
Patient reports depressed mood since evicted from her apartment  - 3/3/23 PHQ-9 score 10 (moderate depression) without SH/SI   - In setting of living situation, recent eviction, and significant comorbidity cant rule out adjustment disorder/cyclothymia     Plan:  - I have discussed with patient different treatments for her depressed moods including starting SSRI  However, patient declines initiating any therapy at this time  Patient states she "has never thoughts of need to be treated for mental health" and reports her depression is manageable off medication at this time  I have discussed with patient that medication can be initiated at a later time if she desires

## 2023-03-04 NOTE — PHYSICAL THERAPY NOTE
Physical Therapy Progress Note     03/04/23 1000   PT Last Visit   PT Visit Date 03/04/23   Note Type   Note Type Treatment   Pain Assessment   Pain Assessment Tool 0-10   Pain Score No Pain   Restrictions/Precautions   Other Precautions Fall Risk   Subjective   Subjective The patient states that she is feeling okay today  Transfers   Sit to Stand 2  Maximal assistance   Additional items Assist x 1; Increased time required;Verbal cues   Stand to Sit 4  Minimal assistance   Additional items Assist x 1; Increased time required;Verbal cues   Ambulation/Elevation   Gait pattern Excessively slow; Short stride; Inconsistent ana;Decreased foot clearance   Gait Assistance 4  Minimal assist   Additional items Assist x 1;Verbal cues   Assistive Device Bariatric Rolling walker   Distance 15 feet, 25 feet  Balance   Static Sitting Fair +   Dynamic Sitting Fair   Static Standing Fair -   Ambulatory Poor +   Activity Tolerance   Activity Tolerance Patient tolerated treatment well;Patient limited by fatigue   Nurse Made Aware Yes  Exercises   Knee AROM Long Arc Quad Sitting;5 reps;Bilateral;AROM   Assessment   Prognosis Good   Problem List Decreased strength;Decreased endurance; Impaired balance;Decreased mobility   Assessment The patient continues to require assistance for transfers, but she was able to ambulate short distances today  She required assistance to stand from the toilet, and a bariatric commode was provided for future use  She was able to stand for several minutes while being cleaned  The patient demonstrated the ability to manuever around obstacles with increased time as well  She will benefit from continued therapy in order to facilitate her return to independence  Goals   Patient Goals To get better  STG Expiration Date 03/16/23   PT Treatment Day 1   Plan   Treatment/Interventions Functional transfer training;LE strengthening/ROM; Therapeutic exercise; Endurance training;Patient/family training; Bed mobility;Gait training   Progress Progressing toward goals   PT Frequency 3-5x/wk   Recommendation   PT Discharge Recommendation Post acute rehabilitation services   Equipment Recommended [facility] Recommended HD Bariatric wheeled walker   [address] Mobility Inpatient   Turning in Flat Bed Without Bedrails 3   Lying on Back to Sitting on Edge of Flat Bed Without Bedrails 2   Moving Bed to Chair 2   Standing Up From Chair Using Arms 2   Walk in Room 3   Climb 3-5 Stairs With Railing 1   Basic Mobility Inpatient Raw Score 13   Basic Mobility Standardized Score 33 99   Highest Level Of Mobility   -HLM Goal 4: Move to chair/commode   JH-HLM Achieved 7: Walk 25 feet or more         An AM-PAC Basic Mobility raw score less than 16 suggests the patient may benefit from discharge to post-acute rehab services      Sherryle Loach, PTA

## 2023-03-04 NOTE — PROGRESS NOTES
Progress Notes - Family Medicine Residency, Yue Rea 1960, 57 y o  female  MRN: [id number]    Unit/Bed#: -01 Encounter: [id number]  Primary Care Provider: Sarah Beth Lewis MD      Admission Date: 2/28/2023 1513  Length of Stay: 3 days  Code Status:  Level 1 - Full Code  Consult:   IP CONSULT TO CASE MANAGEMENT    Homelessness  Assessment & Plan  Patient reports both her and her son have been homelessness since Feb 2023 after eviction from her apartment  -  consulted for social support - sending STR blanket referral, local Worship group contact given     Abnormal urinalysis  Assessment & Plan  H/O abnormal UA with negative urine culture in the past  - Patient denies any abdominal pain or dysuria  - 2/28/23 UA: >300 protein, 500 glucose, 1+ ketone, small blood  - 2/28/23 Urine micro: innumerable RBC and WBC  - 2/28/23 > 100K of Candida species    Plan:  - Monitor clinically off ABX for asymptomatic bacteruria       Results from last 7 days   Lab Units 02/28/23 2010   URINE CULTURE  >100,000 cfu/ml Candida glabrata*  30,000-39,000 cfu/ml       Depressed mood  Assessment & Plan  Patient reports depressed mood since evicted from her apartment  - 3/3/23 PHQ-9 score 10 (moderate depression) without SH/SI   - In setting of living situation, recent eviction, and significant comorbidity cant rule out adjustment disorder/cyclothymia   -    Plan:  - I have discussed with patient different treatments for her depressed moods including starting SSRI  However, patient declines initiating any therapy at this time  Patient states she "has never thoughts of need to be treated for mental health" and reports her depression is manageable off medication at this time  I have discussed with patient that medication can be initiated at a later time if she desires      Type 2 diabetes mellitus with other specified complication [facility])  Assessment & Plan  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 as well as microalbuminuria  Worsening by daily Prednisone usage for PMR   - BS range after 20 u lantus started 150-162  - BS at goal     Plan:  - Hold home insulin for now, consider resume on discharge  - Start ISS for tighter BG control as patient resumed on home prednisone, ISS alg 5  - Continue Lantus at 20 u for now, monitoring SSI total amount, if > 10/day, considering increased Lantus to 25  - CHO ctrl diet  - Recommend close follow-up with endocrinologist outpatient    Results from last 7 days   Lab Units 03/04/23  0625 03/03/23  2114 03/03/23  1840 03/03/23  1557 03/03/23  1129 03/03/23  0601 03/02/23  2111   POC GLUCOSE mg/dl 162* 258* 285* 259* 270* 180* 275*         VTE Pharmacologic Prophylaxis: Enoxaparin (Lovenox)  VTE Mechanical Prophylaxis: sequential compression device and foot pump applied  Diet:       Diet Orders   (From admission, onward)             Start     Ordered    03/03/23 2207  Dietary nutrition supplements  Once        Question Answer Comment   Select Supplement: Glucerna-Strawberry    Frequency Breakfast, Lunch, Dinner        03/03/23 2206    03/02/23 1044  Diet Adria/CHO Controlled; Consistent Carbohydrate Diet Level 1 (4 carb servings/60 grams CHO/meal)  Diet effective now        References:    Nutrtion Support Algorithm Enteral vs  Parenteral   Question Answer Comment   Diet Type Adria/CHO Controlled    Adria/CHO Controlled Consistent Carbohydrate Diet Level 1 (4 carb servings/60 grams CHO/meal)    RD to adjust diet per protocol?  Yes        03/02/23 1044                Code Status: Full code  Disposition: MARK stable, awaiting of STR placement     Discussed with attending physician, Dr Louie, and  FM team   Subjective:   Seen and exam this morning, doing well  Mood is slightly better, enjoying breakfast  BS is in range s/p first QHS dose of Lantus  Had discussion with CM yesterday, still unsure about immediate future of housing  Objective:     Vitals: Blood pressure 164/87, pulse 84, temperature 98 2 °F (36 8 °C), resp  rate 18, height 5' 8" (1 727 m), weight (!) 152 kg (335 lb), SpO2 92 %, not currently breastfeeding  ,Body mass index is 50 94 kg/m²  Intake/Output Summary (Last 24 hours) at 3/4/2023 0916  Last data filed at 3/3/2023 2155  Gross per 24 hour   Intake 645 ml   Output 600 ml   Net 45 ml       Physical Exam  Constitutional:       General: She is not in acute distress  Appearance: She is obese  She is not toxic-appearing  HENT:      Head: Normocephalic and atraumatic  Nose: No congestion  Mouth/Throat:      Pharynx: No oropharyngeal exudate  Eyes:      General: No scleral icterus  Cardiovascular:      Rate and Rhythm: Normal rate and regular rhythm  Heart sounds: Normal heart sounds  No murmur heard  Pulmonary:      Effort: No respiratory distress  Breath sounds: Normal breath sounds  Abdominal:      General: Abdomen is flat  There is no distension  Palpations: Abdomen is soft  Genitourinary:     Rectum: Guaiac result negative  Musculoskeletal:         General: No swelling  Cervical back: No rigidity  Right lower leg: Edema present  Left lower leg: Edema present  Comments: 2+ pitting edema b/l  Venous stasis dermatitis, no apparent skin break    Lymphadenopathy:      Cervical: No cervical adenopathy  Skin:     General: Skin is warm and dry  Capillary Refill: Capillary refill takes less than 2 seconds  Coloration: Skin is not jaundiced  Findings: Rash present  No erythema        Comments: Venous stasis scaly dermatitis rash lower extremities b/l    Neurological:      General: No focal deficit present  Mental Status: She is alert and oriented to person, place, and time  Cranial Nerves: No cranial nerve deficit  Motor: No weakness  Psychiatric:         Mood and Affect: Mood normal          Behavior: Behavior normal          Invasive Devices     Peripheral Intravenous Line  Duration           Peripheral IV 03/04/23 Left;Proximal;Ventral (anterior) Forearm <1 day                           Lab and other studies:  I have personally reviewed pertinent reports  No results displayed because visit has over 200 results            Recent Results (from the past 24 hour(s))   Fingerstick Glucose (POCT)    Collection Time: 03/03/23 11:29 AM   Result Value Ref Range    POC Glucose 270 (H) 65 - 140 mg/dl   Fingerstick Glucose (POCT)    Collection Time: 03/03/23  3:57 PM   Result Value Ref Range    POC Glucose 259 (H) 65 - 140 mg/dl   Fingerstick Glucose (POCT)    Collection Time: 03/03/23  6:40 PM   Result Value Ref Range    POC Glucose 285 (H) 65 - 140 mg/dl   Fingerstick Glucose (POCT)    Collection Time: 03/03/23  9:14 PM   Result Value Ref Range    POC Glucose 258 (H) 65 - 140 mg/dl   CBC and differential    Collection Time: 03/04/23  4:35 AM   Result Value Ref Range    WBC 11 62 (H) 4 31 - 10 16 Thousand/uL    RBC 4 48 3 81 - 5 12 Million/uL    Hemoglobin 10 5 (L) 11 5 - 15 4 g/dL    Hematocrit 35 2 34 8 - 46 1 %    MCV 79 (L) 82 - 98 fL    MCH 23 4 (L) 26 8 - 34 3 pg    MCHC 29 8 (L) 31 4 - 37 4 g/dL    RDW 17 1 (H) 11 6 - 15 1 %    MPV 11 7 8 9 - 12 7 fL    Platelets 067 (H) 961 - 390 Thousands/uL    nRBC 0 /100 WBCs    Neutrophils Relative 60 43 - 75 %    Immat GRANS % 1 0 - 2 %    Lymphocytes Relative 27 14 - 44 %    Monocytes Relative 8 4 - 12 %    Eosinophils Relative 3 0 - 6 %    Basophils Relative 1 0 - 1 %    Neutrophils Absolute 7 11 1 85 - 7 62 Thousands/µL    Immature Grans Absolute 0 08 0 00 - 0 20 Thousand/uL Lymphocytes Absolute 3 13 0 60 - 4 47 Thousands/µL    Monocytes Absolute 0 87 0 17 - 1 22 Thousand/µL    Eosinophils Absolute 0 36 0 00 - 0 61 Thousand/µL    Basophils Absolute 0 07 0 00 - 0 10 Thousands/µL   Comprehensive metabolic panel    Collection Time: 03/04/23  4:35 AM   Result Value Ref Range    Sodium 143 135 - 147 mmol/L    Potassium 3 3 (L) 3 5 - 5 3 mmol/L    Chloride 108 96 - 108 mmol/L    CO2 29 21 - 32 mmol/L    ANION GAP 6 4 - 13 mmol/L    BUN 11 5 - 25 mg/dL    Creatinine 0 96 0 60 - 1 30 mg/dL    Glucose 150 (H) 65 - 140 mg/dL    Calcium 9 3 8 3 - 10 1 mg/dL    Corrected Calcium 10 7 (H) 8 3 - 10 1 mg/dL    AST 15 5 - 45 U/L    ALT 12 12 - 78 U/L    Alkaline Phosphatase 128 (H) 46 - 116 U/L    Total Protein 6 4 6 4 - 8 4 g/dL    Albumin 2 2 (L) 3 5 - 5 0 g/dL    Total Bilirubin 0 41 0 20 - 1 00 mg/dL    eGFR 62 ml/min/1 73sq m   Fingerstick Glucose (POCT)    Collection Time: 03/04/23  6:25 AM   Result Value Ref Range    POC Glucose 162 (H) 65 - 140 mg/dl     Blood Culture:   Lab Results   Component Value Date    BLOODCX No Growth After 5 Days   02/11/2023   ,   Urinalysis:   Lab Results   Component Value Date    COLORU  02/28/2023      Comment:      refer to results    COLORU Yellow 02/28/2023    COLORU Yellow 05/06/2014    CLARITYU Clear 02/28/2023    CLARITYU Clear 05/06/2014    SPECGRAV >=1 030 02/28/2023    SPECGRAV 1 010 05/06/2014    PHUR 6 0 02/28/2023    PHUR 5 5 05/06/2014    LEUKOCYTESUR Negative 02/28/2023    LEUKOCYTESUR Negative 05/06/2014    NITRITE Negative 02/28/2023    NITRITE Negative 05/06/2014    PROTEINUA 100 (2+) (A) 05/06/2014    GLUCOSEU 500 (1/2%) (A) 02/28/2023    GLUCOSEU 500 (1/2%) (A) 05/06/2014    KETONESU 15 (1+) (A) 02/28/2023    KETONESU Negative 05/06/2014    BILIRUBINUR Negative 02/28/2023    BILIRUBINUR Negative 05/06/2014    BLOODU Small (A) 02/28/2023    BLOODU Trace (A) 05/06/2014   ,   Urine Culture:   Lab Results   Component Value Date    URINECX >100,000 cfu/ml Candida glabrata (A) 02/28/2023    URINECX 30,000-39,000 cfu/ml 02/28/2023   ,   Wound Culure: No results found for: WOUNDCULT      Imaging:    XR chest portable    Result Date: 3/2/2023  Improvement in pulmonary vascular congestion with minimal edema  Stable cardiomegaly    Workstation performed: [id number]        Current Facility-Administered Medications   Medication Dose Route Frequency   • aspirin (ECOTRIN LOW STRENGTH) EC tablet 81 mg  81 mg Oral Daily   • atorvastatin (LIPITOR) tablet 40 mg  40 mg Oral Daily   • budesonide-formoterol (SYMBICORT) 160-4 5 mcg/act inhaler 1 puff  1 puff Inhalation BID   • enoxaparin (LOVENOX) subcutaneous injection 60 mg  60 mg Subcutaneous Daily   • ferrous sulfate tablet 325 mg  325 mg Oral BID With Meals   • insulin glargine (LANTUS) subcutaneous injection 20 Units 0 2 mL  20 Units Subcutaneous HS   • insulin lispro (HumaLOG) 100 units/mL subcutaneous injection 4-20 Units  4-20 Units Subcutaneous 4 times day   • labetalol (NORMODYNE) injection 10 mg  10 mg Intravenous Q6H PRN   • lactase (LACTAID) tablet 6,000 Units  6,000 Units Oral TID With Meals   • losartan (COZAAR) tablet 100 mg  100 mg Oral Daily   • metFORMIN (GLUCOPHAGE-XR) 24 hr tablet 1,000 mg  1,000 mg Oral BID With Meals   • metoprolol tartrate (LOPRESSOR) tablet 50 mg  50 mg Oral Q12H RONI   • polyethylene glycol (MIRALAX) packet 17 g  17 g Oral BID PRN   • potassium chloride (K-DUR,KLOR-CON) CR tablet 40 mEq  40 mEq Oral Once   • predniSONE tablet 4 mg  4 mg Oral Daily             Charu Gutierrez, DO PGY-2  Family Medicine

## 2023-03-05 LAB
ALBUMIN SERPL BCP-MCNC: 2.2 G/DL (ref 3.5–5)
ALP SERPL-CCNC: 118 U/L (ref 46–116)
ALT SERPL W P-5'-P-CCNC: 14 U/L (ref 12–78)
ANION GAP SERPL CALCULATED.3IONS-SCNC: 2 MMOL/L (ref 4–13)
AST SERPL W P-5'-P-CCNC: 17 U/L (ref 5–45)
BASOPHILS # BLD AUTO: 0.06 THOUSANDS/ÂΜL (ref 0–0.1)
BASOPHILS NFR BLD AUTO: 1 % (ref 0–1)
BILIRUB SERPL-MCNC: 0.34 MG/DL (ref 0.2–1)
BUN SERPL-MCNC: 16 MG/DL (ref 5–25)
CA-I BLD-SCNC: 1.17 MMOL/L (ref 1.12–1.32)
CALCIUM ALBUM COR SERPL-MCNC: 10.7 MG/DL (ref 8.3–10.1)
CALCIUM SERPL-MCNC: 9.3 MG/DL (ref 8.3–10.1)
CHLORIDE SERPL-SCNC: 107 MMOL/L (ref 96–108)
CO2 SERPL-SCNC: 29 MMOL/L (ref 21–32)
CREAT SERPL-MCNC: 1.03 MG/DL (ref 0.6–1.3)
EOSINOPHIL # BLD AUTO: 0.36 THOUSAND/ÂΜL (ref 0–0.61)
EOSINOPHIL NFR BLD AUTO: 3 % (ref 0–6)
ERYTHROCYTE [DISTWIDTH] IN BLOOD BY AUTOMATED COUNT: 17.1 % (ref 11.6–15.1)
GFR SERPL CREATININE-BSD FRML MDRD: 57 ML/MIN/1.73SQ M
GLUCOSE SERPL-MCNC: 131 MG/DL (ref 65–140)
GLUCOSE SERPL-MCNC: 152 MG/DL (ref 65–140)
GLUCOSE SERPL-MCNC: 191 MG/DL (ref 65–140)
GLUCOSE SERPL-MCNC: 197 MG/DL (ref 65–140)
GLUCOSE SERPL-MCNC: 222 MG/DL (ref 65–140)
GLUCOSE SERPL-MCNC: 230 MG/DL (ref 65–140)
GLUCOSE SERPL-MCNC: 264 MG/DL (ref 65–140)
HCT VFR BLD AUTO: 35.7 % (ref 34.8–46.1)
HGB BLD-MCNC: 10.6 G/DL (ref 11.5–15.4)
IMM GRANULOCYTES # BLD AUTO: 0.08 THOUSAND/UL (ref 0–0.2)
IMM GRANULOCYTES NFR BLD AUTO: 1 % (ref 0–2)
LYMPHOCYTES # BLD AUTO: 3.19 THOUSANDS/ÂΜL (ref 0.6–4.47)
LYMPHOCYTES NFR BLD AUTO: 26 % (ref 14–44)
MCH RBC QN AUTO: 23 PG (ref 26.8–34.3)
MCHC RBC AUTO-ENTMCNC: 29.7 G/DL (ref 31.4–37.4)
MCV RBC AUTO: 77 FL (ref 82–98)
MONOCYTES # BLD AUTO: 0.88 THOUSAND/ÂΜL (ref 0.17–1.22)
MONOCYTES NFR BLD AUTO: 7 % (ref 4–12)
NEUTROPHILS # BLD AUTO: 7.81 THOUSANDS/ÂΜL (ref 1.85–7.62)
NEUTS SEG NFR BLD AUTO: 62 % (ref 43–75)
NRBC BLD AUTO-RTO: 0 /100 WBCS
PLATELET # BLD AUTO: 422 THOUSANDS/UL (ref 149–390)
PMV BLD AUTO: 10.4 FL (ref 8.9–12.7)
POTASSIUM SERPL-SCNC: 4.3 MMOL/L (ref 3.5–5.3)
PREALB SERPL-MCNC: 11.7 MG/DL (ref 18–40)
PROT SERPL-MCNC: 6.1 G/DL (ref 6.4–8.4)
RBC # BLD AUTO: 4.61 MILLION/UL (ref 3.81–5.12)
SODIUM SERPL-SCNC: 138 MMOL/L (ref 135–147)
WBC # BLD AUTO: 12.38 THOUSAND/UL (ref 4.31–10.16)

## 2023-03-05 RX ORDER — INSULIN GLARGINE 100 [IU]/ML
25 INJECTION, SOLUTION SUBCUTANEOUS
Status: DISCONTINUED | OUTPATIENT
Start: 2023-03-05 | End: 2023-03-06

## 2023-03-05 RX ADMIN — ATORVASTATIN CALCIUM 40 MG: 40 TABLET, FILM COATED ORAL at 08:25

## 2023-03-05 RX ADMIN — INSULIN LISPRO 8 UNITS: 100 INJECTION, SOLUTION INTRAVENOUS; SUBCUTANEOUS at 10:20

## 2023-03-05 RX ADMIN — Medication 6000 UNITS: at 11:28

## 2023-03-05 RX ADMIN — FERROUS SULFATE TAB 325 MG (65 MG ELEMENTAL FE) 325 MG: 325 (65 FE) TAB at 16:19

## 2023-03-05 RX ADMIN — Medication 6000 UNITS: at 08:27

## 2023-03-05 RX ADMIN — METOPROLOL TARTRATE 50 MG: 50 TABLET, FILM COATED ORAL at 08:25

## 2023-03-05 RX ADMIN — ENOXAPARIN SODIUM 60 MG: 60 INJECTION SUBCUTANEOUS at 08:26

## 2023-03-05 RX ADMIN — INSULIN LISPRO 4 UNITS: 100 INJECTION, SOLUTION INTRAVENOUS; SUBCUTANEOUS at 08:27

## 2023-03-05 RX ADMIN — METFORMIN HYDROCHLORIDE 1000 MG: 500 TABLET, EXTENDED RELEASE ORAL at 08:27

## 2023-03-05 RX ADMIN — INSULIN GLARGINE 25 UNITS: 100 INJECTION, SOLUTION SUBCUTANEOUS at 21:22

## 2023-03-05 RX ADMIN — INSULIN LISPRO 8 UNITS: 100 INJECTION, SOLUTION INTRAVENOUS; SUBCUTANEOUS at 13:09

## 2023-03-05 RX ADMIN — METFORMIN HYDROCHLORIDE 1000 MG: 500 TABLET, EXTENDED RELEASE ORAL at 16:19

## 2023-03-05 RX ADMIN — Medication 6000 UNITS: at 16:19

## 2023-03-05 RX ADMIN — BUDESONIDE AND FORMOTEROL FUMARATE DIHYDRATE 1 PUFF: 160; 4.5 AEROSOL RESPIRATORY (INHALATION) at 18:39

## 2023-03-05 RX ADMIN — BUDESONIDE AND FORMOTEROL FUMARATE DIHYDRATE 1 PUFF: 160; 4.5 AEROSOL RESPIRATORY (INHALATION) at 08:26

## 2023-03-05 RX ADMIN — INSULIN LISPRO 8 UNITS: 100 INJECTION, SOLUTION INTRAVENOUS; SUBCUTANEOUS at 18:38

## 2023-03-05 RX ADMIN — PREDNISONE 4 MG: 1 TABLET ORAL at 08:26

## 2023-03-05 RX ADMIN — METOPROLOL TARTRATE 50 MG: 50 TABLET, FILM COATED ORAL at 21:23

## 2023-03-05 RX ADMIN — LOSARTAN POTASSIUM 100 MG: 50 TABLET, FILM COATED ORAL at 08:25

## 2023-03-05 RX ADMIN — ASPIRIN 81 MG: 81 TABLET, COATED ORAL at 08:25

## 2023-03-05 RX ADMIN — FERROUS SULFATE TAB 325 MG (65 MG ELEMENTAL FE) 325 MG: 325 (65 FE) TAB at 08:25

## 2023-03-05 NOTE — PLAN OF CARE
Problem: SAFETY ADULT  Goal: Patient will remain free of falls  Description: INTERVENTIONS:  - Educate patient/family on patient safety including physical limitations  - Instruct patient to call for assistance with activity   - Consult OT/PT to assist with strengthening/mobility   - Keep Call bell within reach  - Keep bed low and locked with side rails adjusted as appropriate  - Keep care items and personal belongings within reach  - Initiate and maintain comfort rounds  - Make Fall Risk Sign visible to staff  - Offer Toileting every Hours, in advance of need  - Initiate/Maintain alarm  - Obtain necessary fall risk management equipment:   - Apply yellow socks and bracelet for high fall risk patients  - Consider moving patient to room near nurses station  Outcome: Progressing

## 2023-03-05 NOTE — ASSESSMENT & PLAN NOTE
Patient reports acute nonproductive cough started several weeks ago a/w exertional dyspnea  Also noted chronic bilateral lower extremity edema started several years ago  - 2/14/23 LE doppler: No evidence of acute or chronic DVT  - 2/17/23 CXR: Cardiomegaly with increased bilateral opacities likely representing CHF  - 2/28/23 COVID/flu/RSV negative  - 2/28/23 ProBNP 95  - Follows outpatient cardiology, last visit 4/27/22  - Initial exam: +Bibasilar rales  Coarse lung sounds  No wheezing or crackles noted  Bilateral 3+ pitting edema with venous stasis dermatitis and very dry skin  - 3/1/23 ECHO: LVEF 60%  Normal systolic function  Normal wall motion  G1DD  Pulmonary artery systolic pressure is mildly increased    Assessment: Acute cough could be secondary to viral respiratory infection  However cannot r/o fluid overloaded state in setting of bilateral LE edema in bibasilar rales   DDx includes hypoalbuminemia vs venous insufficiency vs CHF    Plan:  - Gentle diuresis as renally tolerated  - Monitor I&O

## 2023-03-05 NOTE — ASSESSMENT & PLAN NOTE
Initial sCr 1 16  - Baseline sCr 1 0-1 1    Assessment: stable CKD-3    Plan:  - Monitor renal function  - Avoid nephrotoxic drugs    Results from last 7 days   Lab Units 03/08/23  0611 03/07/23  0441 03/06/23  0439 03/05/23  0444 03/04/23  0435 03/03/23  0430 03/02/23  0437   BUN mg/dL 21 24 19 16 11 10 10   CREATININE mg/dL 1 02 1 22 1 07 1 03 0 96 1 02 1 02   EGFR ml/min/1 73sq m 58 46 54 57 62 58 58

## 2023-03-05 NOTE — ASSESSMENT & PLAN NOTE
Patient reports progressively worsening generalized weakness started several month ago and was recently evicted from her apartment early Feb 2023 after sustaining a fall at home  Patient states she has difficulty ambulating, currently wheelchair dependent, and also has difficulty with standing up and sitting down from the toilet  - 2/28/23 Prealbumin 10 3 (low)  - Initial exam: 3/5 strength bilateral LEs and 4/5 strength bilateral UEs  No focal deficits  CN II-XII grossly intact  - 3/1 Rapid Response:slid out of bed and landed on bottom  No headstrike  - 3/2 PTOT: Post acute rehabilitation services    Assessment: Generalized weakness likely multifactorial in setting of nutrition, chronic diseases, and comorbidities      Plan:  - Fall precaution  - CM consulted for STR placement per PT/OT rec

## 2023-03-05 NOTE — ASSESSMENT & PLAN NOTE
Initial Hgb 11 3  - H/O chronic anemia not on home FeSO4 supplement  - 2/28/23 iron panel: Fe 40, FeSat 15%, TIBC 266, Ferritin 50    Assessment: Chronic anemia likely secondary to chronic disease vs CKD vs iron deficiency   Low suspicion for acute blood loss    Plan:  - Monitor Hgb  - Start Fe supplement  - Miralax PRN for iron-induced constipation  - Will check B12, folate    Results from last 7 days   Lab Units 03/08/23  0611 03/07/23  0441 03/06/23  0439 03/05/23  0444 03/04/23  0435 03/03/23  0430 03/02/23  0437   HEMOGLOBIN g/dL 10 4* 10 6* 11 1* 10 6* 10 5* 10 3* 9 7*   HEMATOCRIT % 34 8 35 5 37 8 35 7 35 2 34 4* 33 8*

## 2023-03-05 NOTE — ASSESSMENT & PLAN NOTE
Initial /79  - Patient reports has not been taking any of her medication for 1-2 weeks due to not poor diet and not feeling well  - Home regimen: Losartan 100 mg daily, metoprolol tartrate 50 mg twice daily  - Follows cardiology outpatient, last visit 4/27/22    Assessment:  - Poorly controlled BP on admission, likely due to not taking her medications  - Goal BP < 140/90  - Most recent Blood Pressure: 259/32   - Systolic (96MJB), KNU:256 , Min:133 , IXR:494   - Diastolic (35KSU), EGL:84, Min:73, Max:76    Plan:  - Resume home meds  - Labetalol 10 mg Q6H PRN for SBP > 180  - Vitals per unit routine

## 2023-03-05 NOTE — PROGRESS NOTES
PROGRESS NOTE - Family Medicine Residency David Ernst 1960, 57 y o  female  MRN: [id number]    Unit/Bed#: MS Ijeoma-01 Encounter: [id number]  Primary Care Provider: Dustin Herzog MD      Admission Date: 2/28/2023  Length of Stay: 4 days  Code Status:  Level 1 - Full Code  Diet: Diet Adria/CHO Controlled; Consistent Carbohydrate Diet Level 1 (4 carb servings/60 grams CHO/meal)  Consult:   IP CONSULT TO CASE MANAGEMENT      Assessment & Plan:     Discussed with [facility] team and finalization is pending attending physician attestation  * Weakness  Assessment & Plan  Patient reports progressively worsening generalized weakness started several month ago and was recently evicted from her apartment early Feb 2023 after sustaining a fall at home  Patient states she has difficulty ambulating, currently wheelchair dependent, and also has difficulty with standing up and sitting down from the toilet  - 2/28/23 Prealbumin 10 3 (low)  - Initial exam: 3/5 strength bilateral LEs and 4/5 strength bilateral UEs  No focal deficits  CN II-XII grossly intact  - 3/1 Rapid Response:slid out of bed and landed on bottom  No headstrike  - 3/2 PTOT: Post acute rehabilitation services    Assessment: Generalized weakness likely multifactorial in setting of nutrition, chronic diseases, and comorbidities      Plan:  - Nutrition supplement with Glucerna 3 times daily with meals  - Fall precaution  - CM consulted for STR placement per PT/OT rec    Type 2 diabetes mellitus with other specified complication ([facility] [address] )  Assessment & Plan  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 as well as microalbuminuria  Worsening by daily Prednisone usage for PMR   - BS range after 20 u lantus started 150-162  - BS at goal     Plan:  - Hold home insulin for now, consider resume on discharge  - Start ISS for tighter BG control as patient resumed on home prednisone, ISS alg 5  - Continue Lantus at 20 u for now, monitoring SSI total amount, if > 10/day, Lantus increased to 25  - CHO ctrl diet  - Recommend close follow-up with endocrinologist outpatient    Results from last 7 days   Lab Units 03/05/23  0555 03/04/23  2114 03/04/23  1754 03/04/23  1311 03/04/23  1024 03/04/23  0625 03/03/23  2114   POC GLUCOSE mg/dl 191* 237* 237* 247* 322* 162* 258*       Class 3 severe obesity with serious comorbidity and body mass index (BMI) of 50 0 to 59 9 in [facility])  Assessment & Plan  Secondary to dietary indiscretion  BMP 50 94  Plan:   CHO ctrl diet     Depressed mood  Assessment & Plan  Patient reports depressed mood since evicted from her apartment  - 3/3/23 PHQ-9 score 10 (moderate depression) without SH/SI   - In setting of living situation, recent eviction, and significant comorbidity cant rule out adjustment disorder/cyclothymia     Plan:  - I have discussed with patient different treatments for her depressed moods including starting SSRI  However, patient declines initiating any therapy at this time  Patient states she "has never thoughts of need to be treated for mental health" and reports her depression is manageable off medication at this time  I have discussed with patient that medication can be initiated at a later time if she desires  Homelessness  Assessment & Plan  Patient reports both her and her son have been homelessness since Feb 2023 after eviction from her apartment    - CM consulted for social support - sending STR blanket referral, local [employer] contact given     Cough  Assessment & Plan  Patient reports acute nonproductive cough started several weeks ago a/w exertional dyspnea  Also noted chronic bilateral lower extremity edema started several years ago  - 2/14/23 LE doppler: No evidence of acute or chronic DVT  - 2/17/23 CXR: Cardiomegaly with increased bilateral opacities likely representing CHF  - 2/28/23 COVID/flu/RSV negative  - 2/28/23 ProBNP 95  - Follows outpatient cardiology, last visit 4/27/22  - Initial exam: +Bibasilar rales  Coarse lung sounds  No wheezing or crackles noted  Bilateral 3+ pitting edema with venous stasis dermatitis and very dry skin  - 3/1/23 ECHO: LVEF 60%  Normal systolic function  Normal wall motion  G1DD  Pulmonary artery systolic pressure is mildly increased    Assessment: Acute cough could be secondary to viral respiratory infection  However cannot r/o fluid overloaded state in setting of bilateral LE edema in bibasilar rales   DDx includes hypoalbuminemia vs venous insufficiency vs CHF    Plan:  - Gentle diuresis as renally tolerated  - Monitor I&O    Bilateral lower extremity edema  Assessment & Plan  Please see detailed A&P under "Acute cough"      Abnormal urinalysis  Assessment & Plan  H/O abnormal UA with negative urine culture in the past  - Patient denies any abdominal pain or dysuria  - 2/28/23 UA: >300 protein, 500 glucose, 1+ ketone, small blood  - 2/28/23 Urine micro: innumerable RBC and WBC  - 2/28/23 > 100K of Candida species    Plan:  - Monitor clinically off ABX for asymptomatic bacteruria       Results from last 7 days   Lab Units 02/28/23 2010   URINE CULTURE  >100,000 cfu/ml Candida glabrata*  30,000-39,000 cfu/ml       Obstructive sleep apnea  Assessment & Plan  H/O MOHAN, however has not been using her CPAP due to being homeless  -Follows sleep medicine outpatient, last visit was 722    Plan:  - Continue CPAP nightly    Stage 3a chronic kidney disease ([facility] [address] )  Assessment & Plan  Initial sCr 1 16  - Baseline sCr 1 0-1 1    Assessment: stable CKD-3    Plan:  - Monitor renal function  - Avoid nephrotoxic drugs    Results from last 7 days   Lab Units 03/05/23  0444 03/04/23  0435 03/03/23  0430 03/02/23  0437 03/01/23  0415 02/28/23  1639   BUN mg/dL 16 11 10 10 11 10   CREATININE mg/dL 1 03 0 96 1 02 1 02 1 08 1 16   EGFR ml/min/1 73sq m 57 62 58 58 54 49       Polymyalgia rheumatica (HCC)  Assessment & Plan  H/O polymyalgia rheumatica  - Patient reports has not been taking any of her medication for 1-2 weeks due to not poor diet and not feeling well  - Home regimen: Prednisone 4mg daily (became immoble on 2mg dose due to severe aches/pains)  - Follows outpatient neurology (last visit 6/9/22) and rheumatology (last visit 2/8/22)    Plan:  - Resume home medication  - Monitor BG    Essential hypertension  Assessment & Plan  Initial /79  - Patient reports has not been taking any of her medication for 1-2 weeks due to not poor diet and not feeling well  - Home regimen: Losartan 100 mg daily, metoprolol tartrate 50 mg twice daily  - Follows cardiology outpatient, last visit 4/27/22    Assessment:  - Poorly controlled BP on admission, likely due to not taking her medications  - Goal BP < 140/90  - Most recent Blood Pressure: 270/31   - Systolic (68EPT), JFJ:351 , Min:161 , PMZ:942   - Diastolic (51ZYZ), KFW:80, Min:86, Max:86    Plan:  - Resume home meds  - Labetalol 10 mg Q6H PRN for SBP > 180  - Vitals per unit routine    Cerebral aneurysm without rupture  Assessment & Plan  Patient denies any headache, dizziness, nausea, vomiting, or any focal neurological symptoms  - 3/7/22 CTA head: no acute intracranial disease, volume loss greater than expected for age  Two, 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  - 2/8/23 CT head: no acute intracranial abnormality   - Follows outpatient neurosurgery, last visit  3/16/22    Plan:  - Monitor clinically  - Low threshold to consult neurosurgery if patient develops severe headache or new neurological symptoms    Mild intermittent asthma without complication  Assessment & Plan  Continue home Symbicort for mild intermittent asthma  - Denies any chest tightness  - No active wheezing on exam    Prolonged QT interval  Assessment & Plan  QTc 507 on 2/28/23 EKG    Hyperlipidemia  Assessment & Plan  H/O HLD  - Patient reports has not been taking any of her medication for 1-2 weeks due to not poor diet and not feeling well  - Home regimen: Atorvastatin 40mg QD  - 5/7/21 lipid panel: Chol 164, , HDL 45, LDL 96    Assessment: ASCVD 23 5%    Plan:  - Continue home meds  - Will recheck fasting lipid panel    Lab Results   Component Value Date    CHOLESTEROL 157 03/01/2023    TRIG 117 03/01/2023    HDL 31 (L) 03/01/2023    LDLCALC 103 (H) 03/01/2023       Anemia  Assessment & Plan  Initial Hgb 11 3  - H/O chronic anemia not on home FeSO4 supplement  - 2/28/23 iron panel: Fe 40, FeSat 15%, TIBC 266, Ferritin 50    Assessment: Chronic anemia likely secondary to chronic disease vs CKD vs iron deficiency   Low suspicion for acute blood loss    Plan:  - Monitor Hgb  - Start Fe supplement  - Miralax PRN for iron-induced constipation  - Will check B12, folate    Results from last 7 days   Lab Units 03/05/23  0444 03/04/23  0435 03/03/23  0430 03/02/23  0437 03/01/23  0415 02/28/23  1639   HEMOGLOBIN g/dL 10 6* 10 5* 10 3* 9 7* 9 9* 11 3*   HEMATOCRIT % 35 7 35 2 34 4* 33 8* 33 8* 37 1       Aortic dilatation (HCC)  Assessment & Plan  H/O Stable ascending thoracic aortic aneurysm  - Patient denies any chest pain on admission  - 2/18/22 CT chest: stable ectasia of the ascending thoracic aorta measuring 4 2 cm  - Follows cardiology outpatient, last visit 4/27/22    Plan:  - Monitor clinically  - Low threshold for repeat CT chest w/ contrast if patient develops acute onset chest pain      Principal Problem:    Weakness  Active Problems:    Type 2 diabetes mellitus with other specified complication (HCC)    Class 3 severe obesity with serious comorbidity and body mass index (BMI) of 50 0 to 59 9 in adult [facility])    Depressed mood    Homelessness    Aortic dilatation (HCC)    Anemia    Hyperlipidemia    Prolonged QT interval    Mild intermittent asthma without complication    Cerebral aneurysm without rupture    Essential hypertension    Polymyalgia rheumatica (HCC)    Stage 3a chronic kidney disease (HCC)    Obstructive sleep apnea    Abnormal urinalysis    Bilateral lower extremity edema    Cough      VTE Pharm PPX: Enoxaparin (Lovenox)  VTE [facility] PPX: sequential compression device and/or foot pump applied unless otherwise contraindicated    Subjective     Hospital Course & 24hr events:     Hospital course:  57 y o  female admitted 2/28/2023, now HD# 4, for weakness and increased blood glucose  Overnight/24hr events:  Overnight events: none  Concerns per nursing staff: none  Patient seen and examined at bedside  States that she is feeling better today  Inquires about if she will ever be able to walk consistently again  Gave her reassurance that the reason for STR would be to rehabilitate her back to her baseline self  Patient was agreeable with plan  Review of Systems:     Review of Systems   Constitutional: Positive for appetite change and fatigue  Negative for chills and fever  HENT: Negative for trouble swallowing  Eyes: Negative for pain and redness  Respiratory: Negative for cough and shortness of breath  Cardiovascular: Positive for leg swelling  Negative for chest pain  Gastrointestinal: Negative for abdominal pain  Endocrine: Negative for cold intolerance and heat intolerance  Genitourinary: Negative for dysuria and hematuria  Musculoskeletal: Negative for arthralgias and myalgias  Skin: Negative for wound  Neurological: Negative for seizures and syncope  Psychiatric/Behavioral: Negative for behavioral problems           Objective     Vitals:     Vitals:    03/04/23 2028 03/04/23 2105 03/04/23 2107 03/04/23 2220   BP:  161/86 161/86 162/86   Pulse:  84 84 86   Resp:       Temp:    99 °F (37 2 °C)   TempSrc:       SpO2: 94%  95% 94%   Weight:       Height:         Temp:  [98 8 °F (37 1 °C)-99 °F (37 2 °C)] 99 °F (37 2 °C)  HR:  [77-86] 86  BP: (161-162)/(86) 162/86  Weight (last 2 days)     None          Intake/Output Summary (Last 24 hours) at 3/5/2023 0743  Last data filed at 3/4/2023 1207  Gross per 24 hour   Intake 714 ml   Output 1800 ml   Net -1086 ml     Invasive Devices     Peripheral Intravenous Line  Duration           Peripheral IV 03/04/23 Left;Proximal;Ventral (anterior) Forearm 1 day                  Labs: I have personally reviewed pertinent reports  Results from last 7 days   Lab Units 03/05/23 0444 03/04/23 0435 03/03/23 0430 03/02/23 0437 03/01/23 0415 02/28/23  1639   WBC Thousand/uL 12 38* 11 62* 12 09* 11 98* 10 97* 9 73   HEMOGLOBIN g/dL 10 6* 10 5* 10 3* 9 7* 9 9* 11 3*   HEMATOCRIT % 35 7 35 2 34 4* 33 8* 33 8* 37 1   PLATELETS Thousands/uL 422* 424* 383 372 390 437*   NEUTROS ABS Thousands/µL 7 81* 7 11 7 87*  --   --  6 89       Results from last 7 days   Lab Units 03/05/23 0444 03/04/23 0435 03/03/23  0430 03/02/23 0437 03/01/23 0415 02/28/23  1639   POTASSIUM mmol/L 4 3 3 3* 3 0* 3 2* 3 5 3 6   CHLORIDE mmol/L 107 108 107 105 102 99   CO2 mmol/L 29 29 30 29 27 29   BUN mg/dL 16 11 10 10 11 10   CREATININE mg/dL 1 03 0 96 1 02 1 02 1 08 1 16   CALCIUM mg/dL 9 3 9 3 9 0 8 8 8 7 9 5   AST U/L 17 15 11  --   --  18   ALT U/L 14 12 10*  --   --  16   ALK PHOS U/L 118* 128* 128*  --   --  165*   EGFR ml/min/1 73sq m 57 62 58 58 54 49   PHOSPHORUS mg/dL  --  2 9  --   --   --   --        Results from last 7 days   Lab Units 03/03/23  0430   CRP mg/L 34 7*       Lab Results   Component Value Date/Time    Blood Culture No Growth After 5 Days  02/11/2023 02:08 AM    Blood Culture No Growth After 5 Days   02/11/2023 01:06 AM    Urine Culture >100,000 cfu/ml Candida glabrata (A) 02/28/2023 08:10 PM    Urine Culture 30,000-39,000 cfu/ml 02/28/2023 08:10 PM         EKG, Pathology, Imaging, and Other Studies:   I have personally reviewed pertinent reports  XR chest portable    Result Date: 3/2/2023  Impression: Improvement in pulmonary vascular congestion with minimal edema  Stable cardiomegaly  Workstation performed: [id number]         Meds/Allergies     All medications and allergies reviewed  No Known Allergies    Continuous Infusions:       Scheduled Meds:  Current Facility-Administered Medications   Medication Dose Route Frequency Provider Last Rate   • aspirin  81 mg Oral Daily Rosalina Ruano MD     • atorvastatin  40 mg Oral Daily Rosalina Ruano MD     • budesonide-formoterol  1 puff Inhalation BID Rosalina Ruano MD     • enoxaparin  60 mg Subcutaneous Daily Rosalina Ruano MD     • ferrous sulfate  325 mg Oral BID With Meals Rosalina Ruano MD     • insulin glargine  25 Units Subcutaneous HS Tu B Le, DO     • insulin lispro  4-20 Units Subcutaneous 4 times day Rosalina Ruano MD     • labetalol  10 mg Intravenous Q6H PRN Rosalina Ruano MD     • lactase  6,000 Units Oral TID With Meals Rosalina Ruano MD     • losartan  100 mg Oral Daily Rosalina Ruano MD     • metFORMIN  1,000 mg Oral BID With Meals Rosalina Ruano MD     • metoprolol tartrate  50 mg Oral Q12H [address] Rosalina Ruano MD     • polyethylene glycol  17 g Oral BID PRN Rosalina Ruano MD     • predniSONE  4 mg Oral Daily Rosalina Ruano MD         PRN Meds:  •  labetalol  •  polyethylene glycol      Physical Exam   Physical Exam  Constitutional:       General: She is not in acute distress  Appearance: She is obese  She is not toxic-appearing  HENT:      Head: Normocephalic and atraumatic  Nose: No congestion  Mouth/Throat:      Pharynx: No oropharyngeal exudate  Eyes:      General: No scleral icterus  Cardiovascular:      Rate and Rhythm: Normal rate and regular rhythm  Heart sounds: Normal heart sounds  No murmur heard    Pulmonary: Effort: No respiratory distress  Breath sounds: Normal breath sounds  Abdominal:      General: Abdomen is flat  There is no distension  Palpations: Abdomen is soft  Genitourinary:     Rectum: Guaiac result negative  Musculoskeletal:         General: No swelling  Cervical back: No rigidity  Right lower leg: Edema present  Left lower leg: Edema present  Comments: 2+ pitting edema b/l  Venous stasis dermatitis, no apparent skin break    Lymphadenopathy:      Cervical: No cervical adenopathy  Skin:     General: Skin is warm and dry  Capillary Refill: Capillary refill takes less than 2 seconds  Coloration: Skin is not jaundiced  Findings: Rash present  No erythema  Comments: Venous stasis scaly dermatitis rash lower extremities b/l    Neurological:      General: No focal deficit present  Mental Status: She is alert and oriented to person, place, and time  Cranial Nerves: No cranial nerve deficit  Motor: No weakness     Psychiatric:         Mood and Affect: Mood normal          Behavior: Behavior normal       Comments: Flat affect                Rell Foreman DO  PGY-1, [facility]  03/05/23, 7:43 AM

## 2023-03-06 LAB
ANION GAP SERPL CALCULATED.3IONS-SCNC: 1 MMOL/L (ref 4–13)
BUN SERPL-MCNC: 19 MG/DL (ref 5–25)
CALCIUM SERPL-MCNC: 8.9 MG/DL (ref 8.3–10.1)
CHLORIDE SERPL-SCNC: 107 MMOL/L (ref 96–108)
CO2 SERPL-SCNC: 30 MMOL/L (ref 21–32)
CREAT SERPL-MCNC: 1.07 MG/DL (ref 0.6–1.3)
ERYTHROCYTE [DISTWIDTH] IN BLOOD BY AUTOMATED COUNT: 17.1 % (ref 11.6–15.1)
GFR SERPL CREATININE-BSD FRML MDRD: 54 ML/MIN/1.73SQ M
GLUCOSE SERPL-MCNC: 127 MG/DL (ref 65–140)
GLUCOSE SERPL-MCNC: 141 MG/DL (ref 65–140)
GLUCOSE SERPL-MCNC: 167 MG/DL (ref 65–140)
GLUCOSE SERPL-MCNC: 169 MG/DL (ref 65–140)
GLUCOSE SERPL-MCNC: 173 MG/DL (ref 65–140)
GLUCOSE SERPL-MCNC: 187 MG/DL (ref 65–140)
HCT VFR BLD AUTO: 37.8 % (ref 34.8–46.1)
HGB BLD-MCNC: 11.1 G/DL (ref 11.5–15.4)
MCH RBC QN AUTO: 22.9 PG (ref 26.8–34.3)
MCHC RBC AUTO-ENTMCNC: 29.4 G/DL (ref 31.4–37.4)
MCV RBC AUTO: 78 FL (ref 82–98)
PLATELET # BLD AUTO: 437 THOUSANDS/UL (ref 149–390)
PMV BLD AUTO: 11.6 FL (ref 8.9–12.7)
POTASSIUM SERPL-SCNC: 4.1 MMOL/L (ref 3.5–5.3)
RBC # BLD AUTO: 4.84 MILLION/UL (ref 3.81–5.12)
SODIUM SERPL-SCNC: 138 MMOL/L (ref 135–147)
WBC # BLD AUTO: 13.52 THOUSAND/UL (ref 4.31–10.16)

## 2023-03-06 RX ORDER — ACETAMINOPHEN 325 MG/1
975 TABLET ORAL EVERY 8 HOURS PRN
Status: DISCONTINUED | OUTPATIENT
Start: 2023-03-06 | End: 2023-03-08 | Stop reason: HOSPADM

## 2023-03-06 RX ORDER — INSULIN GLARGINE 100 [IU]/ML
30 INJECTION, SOLUTION SUBCUTANEOUS
Status: DISCONTINUED | OUTPATIENT
Start: 2023-03-07 | End: 2023-03-08 | Stop reason: HOSPADM

## 2023-03-06 RX ADMIN — ATORVASTATIN CALCIUM 40 MG: 40 TABLET, FILM COATED ORAL at 08:39

## 2023-03-06 RX ADMIN — Medication 6000 UNITS: at 17:08

## 2023-03-06 RX ADMIN — Medication 6000 UNITS: at 08:39

## 2023-03-06 RX ADMIN — METOPROLOL TARTRATE 50 MG: 50 TABLET, FILM COATED ORAL at 21:07

## 2023-03-06 RX ADMIN — LOSARTAN POTASSIUM 100 MG: 50 TABLET, FILM COATED ORAL at 08:39

## 2023-03-06 RX ADMIN — METOPROLOL TARTRATE 50 MG: 50 TABLET, FILM COATED ORAL at 08:40

## 2023-03-06 RX ADMIN — INSULIN LISPRO 4 UNITS: 100 INJECTION, SOLUTION INTRAVENOUS; SUBCUTANEOUS at 18:10

## 2023-03-06 RX ADMIN — INSULIN LISPRO 4 UNITS: 100 INJECTION, SOLUTION INTRAVENOUS; SUBCUTANEOUS at 09:31

## 2023-03-06 RX ADMIN — METFORMIN HYDROCHLORIDE 1000 MG: 500 TABLET, EXTENDED RELEASE ORAL at 17:08

## 2023-03-06 RX ADMIN — FERROUS SULFATE TAB 325 MG (65 MG ELEMENTAL FE) 325 MG: 325 (65 FE) TAB at 08:49

## 2023-03-06 RX ADMIN — INSULIN GLARGINE 25 UNITS: 100 INJECTION, SOLUTION SUBCUTANEOUS at 21:07

## 2023-03-06 RX ADMIN — FERROUS SULFATE TAB 325 MG (65 MG ELEMENTAL FE) 325 MG: 325 (65 FE) TAB at 17:08

## 2023-03-06 RX ADMIN — BUDESONIDE AND FORMOTEROL FUMARATE DIHYDRATE 1 PUFF: 160; 4.5 AEROSOL RESPIRATORY (INHALATION) at 08:49

## 2023-03-06 RX ADMIN — INSULIN LISPRO 4 UNITS: 100 INJECTION, SOLUTION INTRAVENOUS; SUBCUTANEOUS at 14:23

## 2023-03-06 RX ADMIN — BUDESONIDE AND FORMOTEROL FUMARATE DIHYDRATE 1 PUFF: 160; 4.5 AEROSOL RESPIRATORY (INHALATION) at 17:08

## 2023-03-06 RX ADMIN — METFORMIN HYDROCHLORIDE 1000 MG: 500 TABLET, EXTENDED RELEASE ORAL at 08:45

## 2023-03-06 RX ADMIN — ENOXAPARIN SODIUM 60 MG: 60 INJECTION SUBCUTANEOUS at 08:42

## 2023-03-06 RX ADMIN — PREDNISONE 4 MG: 1 TABLET ORAL at 08:40

## 2023-03-06 RX ADMIN — ASPIRIN 81 MG: 81 TABLET, COATED ORAL at 08:39

## 2023-03-06 RX ADMIN — ACETAMINOPHEN 975 MG: 325 TABLET ORAL at 11:30

## 2023-03-06 NOTE — ED ATTENDING ATTESTATION
2/28/2023  IAnil MD, saw and evaluated the patient  I have discussed the patient with the resident/non-physician practitioner and agree with the resident's/non-physician practitioner's findings, Plan of Care, and MDM as documented in the resident's/non-physician practitioner's note, except where noted  All available labs and Radiology studies were reviewed  I was present for key portions of any procedure(s) performed by the resident/non-physician practitioner and I was immediately available to provide assistance  At this point I agree with the current assessment done in the Emergency Department  I have conducted an independent evaluation of this patient a history and physical is as follows:    ED Course     Patient presents for evaluation secondary to decreased appetite  Patient reports living at the [facility] and not feeling well  Patient has not taken her insulin in the last few days  Reports being physically unable to stand due to feeling weak  No additional complaints  A/P: Weakness, homelessness, hyperglycemia  Concern for DKA or dehydration  Will check labs and give IV fluids       Critical Care Time  Procedures

## 2023-03-07 PROBLEM — R19.7 DIARRHEA: Status: ACTIVE | Noted: 2023-03-07

## 2023-03-07 LAB
ANION GAP SERPL CALCULATED.3IONS-SCNC: 5 MMOL/L (ref 4–13)
BUN SERPL-MCNC: 24 MG/DL (ref 5–25)
C DIFF TOX GENS STL QL NAA+PROBE: NEGATIVE
CALCIUM SERPL-MCNC: 8.7 MG/DL (ref 8.3–10.1)
CHLORIDE SERPL-SCNC: 106 MMOL/L (ref 96–108)
CO2 SERPL-SCNC: 26 MMOL/L (ref 21–32)
CREAT SERPL-MCNC: 1.22 MG/DL (ref 0.6–1.3)
ERYTHROCYTE [DISTWIDTH] IN BLOOD BY AUTOMATED COUNT: 17.2 % (ref 11.6–15.1)
GFR SERPL CREATININE-BSD FRML MDRD: 46 ML/MIN/1.73SQ M
GLUCOSE SERPL-MCNC: 135 MG/DL (ref 65–140)
GLUCOSE SERPL-MCNC: 136 MG/DL (ref 65–140)
GLUCOSE SERPL-MCNC: 205 MG/DL (ref 65–140)
GLUCOSE SERPL-MCNC: 211 MG/DL (ref 65–140)
GLUCOSE SERPL-MCNC: 220 MG/DL (ref 65–140)
GLUCOSE SERPL-MCNC: 224 MG/DL (ref 65–140)
GLUCOSE SERPL-MCNC: 239 MG/DL (ref 65–140)
HCT VFR BLD AUTO: 35.5 % (ref 34.8–46.1)
HGB BLD-MCNC: 10.6 G/DL (ref 11.5–15.4)
MCH RBC QN AUTO: 23.6 PG (ref 26.8–34.3)
MCHC RBC AUTO-ENTMCNC: 29.9 G/DL (ref 31.4–37.4)
MCV RBC AUTO: 79 FL (ref 82–98)
PLATELET # BLD AUTO: 452 THOUSANDS/UL (ref 149–390)
PMV BLD AUTO: 11.6 FL (ref 8.9–12.7)
POTASSIUM SERPL-SCNC: 3.6 MMOL/L (ref 3.5–5.3)
RBC # BLD AUTO: 4.5 MILLION/UL (ref 3.81–5.12)
SODIUM SERPL-SCNC: 137 MMOL/L (ref 135–147)
WBC # BLD AUTO: 14.38 THOUSAND/UL (ref 4.31–10.16)

## 2023-03-07 RX ORDER — LOPERAMIDE HYDROCHLORIDE 2 MG/1
2 CAPSULE ORAL 4 TIMES DAILY PRN
Status: DISCONTINUED | OUTPATIENT
Start: 2023-03-07 | End: 2023-03-08 | Stop reason: HOSPADM

## 2023-03-07 RX ADMIN — ASPIRIN 81 MG: 81 TABLET, COATED ORAL at 09:46

## 2023-03-07 RX ADMIN — METOPROLOL TARTRATE 50 MG: 50 TABLET, FILM COATED ORAL at 09:46

## 2023-03-07 RX ADMIN — INSULIN LISPRO 8 UNITS: 100 INJECTION, SOLUTION INTRAVENOUS; SUBCUTANEOUS at 15:45

## 2023-03-07 RX ADMIN — LOSARTAN POTASSIUM 100 MG: 50 TABLET, FILM COATED ORAL at 09:46

## 2023-03-07 RX ADMIN — FERROUS SULFATE TAB 325 MG (65 MG ELEMENTAL FE) 325 MG: 325 (65 FE) TAB at 09:46

## 2023-03-07 RX ADMIN — ATORVASTATIN CALCIUM 40 MG: 40 TABLET, FILM COATED ORAL at 09:46

## 2023-03-07 RX ADMIN — ACETAMINOPHEN 975 MG: 325 TABLET ORAL at 09:47

## 2023-03-07 RX ADMIN — INSULIN LISPRO 4 UNITS: 100 INJECTION, SOLUTION INTRAVENOUS; SUBCUTANEOUS at 09:47

## 2023-03-07 RX ADMIN — Medication 6000 UNITS: at 18:00

## 2023-03-07 RX ADMIN — ENOXAPARIN SODIUM 60 MG: 60 INJECTION SUBCUTANEOUS at 09:46

## 2023-03-07 RX ADMIN — BUDESONIDE AND FORMOTEROL FUMARATE DIHYDRATE 1 PUFF: 160; 4.5 AEROSOL RESPIRATORY (INHALATION) at 18:00

## 2023-03-07 RX ADMIN — BUDESONIDE AND FORMOTEROL FUMARATE DIHYDRATE 1 PUFF: 160; 4.5 AEROSOL RESPIRATORY (INHALATION) at 09:46

## 2023-03-07 RX ADMIN — Medication 6000 UNITS: at 09:46

## 2023-03-07 RX ADMIN — Medication 6000 UNITS: at 13:15

## 2023-03-07 RX ADMIN — LOPERAMIDE HYDROCHLORIDE 2 MG: 2 CAPSULE ORAL at 13:16

## 2023-03-07 RX ADMIN — PREDNISONE 4 MG: 1 TABLET ORAL at 09:46

## 2023-03-07 RX ADMIN — METOPROLOL TARTRATE 50 MG: 50 TABLET, FILM COATED ORAL at 22:41

## 2023-03-07 RX ADMIN — INSULIN LISPRO 8 UNITS: 100 INJECTION, SOLUTION INTRAVENOUS; SUBCUTANEOUS at 18:10

## 2023-03-07 RX ADMIN — METFORMIN HYDROCHLORIDE 1000 MG: 500 TABLET, EXTENDED RELEASE ORAL at 09:45

## 2023-03-07 RX ADMIN — INSULIN GLARGINE 30 UNITS: 100 INJECTION, SOLUTION SUBCUTANEOUS at 22:42

## 2023-03-07 RX ADMIN — FERROUS SULFATE TAB 325 MG (65 MG ELEMENTAL FE) 325 MG: 325 (65 FE) TAB at 18:00

## 2023-03-07 NOTE — PHYSICAL THERAPY NOTE
Physical Therapy Treatment Note       03/07/23 1025   PT Last Visit   PT Visit Date 03/07/23   Note Type   Note Type Treatment   Pain Assessment   Pain Assessment Tool 0-10   Pain Score 5   Pain Location/Orientation Location: Abdomen   Patient's Stated Pain Goal No pain   Hospital Pain Intervention(s) Repositioned; Ambulation/increased activity   Restrictions/Precautions   Weight Bearing Precautions Per Order No   Other Precautions Contact/isolation;Multiple lines;Telemetry; Fall Risk;Pain   General   Chart Reviewed Yes   Family/Caregiver Present Yes   Cognition   Overall Cognitive Status Impaired   Arousal/Participation Cooperative;Arousable   Attention Attends with cues to redirect   Orientation Level Oriented X4   Memory Unable to assess   Following Commands Follows one step commands without difficulty   Subjective   Subjective states she feels very weak, unsteady  states her LEs feel as if they will buckle  willing to mobilize for purpose of using commande and to help clean pt due to bowel incontinence in bed   Bed Mobility   Supine to Sit 3  Moderate assistance   Additional items Assist x 1; Increased time required   Additional Comments time spent for setup, as well as several minutes at EOB   Transfers   Sit to Stand 3  Moderate assistance   Additional items Assist x 1   Stand to Sit 3  Moderate assistance   Additional items Assist x 1   Toilet transfer 3  Moderate assistance   Additional items Assist x 1   Additional Comments stood x multiple minutes to help clean pt due to bowel incontinence   Ambulation/Elevation   Gait pattern   (slow, wide EUGENIE, antalgic, ataxia)   Gait Assistance 3  Moderate assist   Additional items Assist x 1   Assistive Device Bariatric Rolling walker   Distance 3-4'x1 from bed to bedside chair   Balance   Static Sitting Good   Dynamic Sitting Fair -   Static Standing Poor +   Dynamic Standing Poor   Ambulatory Poor   Endurance Deficit   Endurance Deficit Yes   Endurance Deficit Description fatigue, weakness, pain   Activity Tolerance   Activity Tolerance Patient tolerated treatment well;Patient limited by pain; Patient limited by fatigue;Treatment limited secondary to medical complications (Comment)   Nurse Made Aware yes   Assessment   Prognosis Good   Problem List Decreased strength; Impaired balance;Decreased endurance;Decreased mobility; Decreased coordination;Pain   Assessment Pt seen for session for setup, bed mob, time spent EOB, transfers, standing time to clean pt, limited gait  Pt cooperative w/ session, but limited by abdominal pain as well as continuous diarrhea during session  most of session spent cleaning pt and assisting w/ mobility to use commode  very weak, dconditioned w/ B knee buckling and ataxia w/ gait  cues given for step length, RW management  continue to recommend rehab at d/c   Goals   Patient Goals to feel better   STG Expiration Date 03/16/23   PT Treatment Day 2   Plan   Treatment/Interventions Functional transfer training;LE strengthening/ROM; Therapeutic exercise; Endurance training;Patient/family training;Equipment eval/education;Gait training;Bed mobility   Progress Progressing toward goals   PT Frequency 3-5x/wk   Recommendation   PT Discharge Recommendation Post acute rehabilitation services   Equipment Recommended [facility] Recommended HD Bariatric wheeled walker   Cindy Mishra 435   Turning in Flat Bed Without Bedrails 3   Lying on Back to Sitting on Edge of Flat Bed Without Bedrails 2   Moving Bed to Chair 2   Standing Up From Chair Using Arms 2   Walk in Room 2   Climb 3-5 Stairs With Railing 2   Basic Mobility Inpatient Raw Score 13   Basic Mobility Standardized Score 33 99   Highest Level Of Mobility   JH-HLM Goal 4: Move to chair/commode   JH-HLM Achieved 4: Move to chair/commode   Osorio Lake PT, DPT CSRS

## 2023-03-07 NOTE — PLAN OF CARE
Problem: PHYSICAL THERAPY ADULT  Goal: Performs mobility at highest level of function for planned discharge setting  See evaluation for individualized goals  Description: Treatment/Interventions: Functional transfer training, LE strengthening/ROM, Patient/family training, Equipment eval/education, Bed mobility, Gait training, Spoke to nursing, Spoke to case management, OT          See flowsheet documentation for full assessment, interventions and recommendations  Note: Prognosis: Good  Problem List: Decreased strength, Impaired balance, Decreased endurance, Decreased mobility, Decreased coordination, Pain  Assessment: Pt seen for session for setup, bed mob, time spent EOB, transfers, standing time to clean pt, limited gait  Pt cooperative w/ session, but limited by abdominal pain as well as continuous diarrhea during session  most of session spent cleaning pt and assisting w/ mobility to use commode  very weak, dconditioned w/ B knee buckling and ataxia w/ gait  cues given for step length, RW management  continue to recommend rehab at d/c        PT Discharge Recommendation: Post acute rehabilitation services    See flowsheet documentation for full assessment

## 2023-03-07 NOTE — PLAN OF CARE
Problem: PAIN - ADULT  Goal: Verbalizes/displays adequate comfort level or baseline comfort level  Description: Interventions:  - Encourage patient to monitor pain and request assistance  - Assess pain using appropriate pain scale  - Administer analgesics based on type and severity of pain and evaluate response  - Implement non-pharmacological measures as appropriate and evaluate response  - Consider cultural and social influences on pain and pain management  - Notify physician/advanced practitioner if interventions unsuccessful or patient reports new pain  Outcome: Progressing     Problem: INFECTION - ADULT  Goal: Absence or prevention of progression during hospitalization  Description: INTERVENTIONS:  - Assess and monitor for signs and symptoms of infection  - Monitor lab/diagnostic results  - Monitor all insertion sites, i e  indwelling lines, tubes, and drains  - Monitor endotracheal if appropriate and nasal secretions for changes in amount and color  - [place] appropriate cooling/warming therapies per order  - Administer medications as ordered  - Instruct and encourage patient and family to use good hand hygiene technique  - Identify and instruct in appropriate isolation precautions for identified infection/condition  Outcome: Progressing  Goal: Absence of fever/infection during neutropenic period  Description: INTERVENTIONS:  - Monitor WBC    Outcome: Progressing     Problem: SAFETY ADULT  Goal: Patient will remain free of falls  Description: INTERVENTIONS:  - Educate patient/family on patient safety including physical limitations  - Instruct patient to call for assistance with activity   - Consult OT/PT to assist with strengthening/mobility   - Keep Call bell within reach  - Keep bed low and locked with side rails adjusted as appropriate  - Keep care items and personal belongings within reach  - Initiate and maintain comfort rounds  - Make Fall Risk Sign visible to staff  - Offer Toileting every Hours, in advance of need  - Initiate/Maintain   alarm  - Obtain necessary fall risk management equipment:     - Apply yellow socks and bracelet for high fall risk patients  - Consider moving patient to room near nurses station  Outcome: Progressing  Goal: Maintain or return to baseline ADL function  Description: INTERVENTIONS:  -  Assess patient's ability to carry out ADLs; assess patient's baseline for ADL function and identify physical deficits which impact ability to perform ADLs (bathing, care of mouth/teeth, toileting, grooming, dressing, etc )  - Assess/evaluate cause of self-care deficits   - Assess range of motion  - Assess patient's mobility; develop plan if impaired  - Assess patient's need for assistive devices and provide as appropriate  - Encourage maximum independence but intervene and supervise when necessary  - Involve family in performance of ADLs  - Assess for home care needs following discharge   - Consider OT consult to assist with ADL evaluation and planning for discharge  - Provide patient education as appropriate  Outcome: Progressing  Goal: Maintains/Returns to pre admission functional level  Description: INTERVENTIONS:  - Perform BMAT or MOVE assessment daily    - Set and communicate daily mobility goal to care team and patient/family/caregiver  - Collaborate with rehabilitation services on mobility goals if consulted  - Perform Range of Motion    times a day  - Reposition patient every    hours    - Dangle patient    times a day  - Stand patient      times a day  - Ambulate patient    times a day  - Out of bed to chair      times a day   - Out of bed for meals    times a day  - Out of bed for toileting  - Record patient progress and toleration of activity level   Outcome: Progressing     Problem: DISCHARGE PLANNING  Goal: Discharge to home or other facility with appropriate resources  Description: INTERVENTIONS:  - Identify barriers to discharge w/patient and caregiver  - Arrange for needed discharge resources and transportation as appropriate  - Identify discharge learning needs (meds, wound care, etc )  - Arrange for interpretive services to assist at discharge as needed  - Refer to Case Management Department for coordinating discharge planning if the patient needs post-hospital services based on physician/advanced practitioner order or complex needs related to functional status, cognitive ability, or social support system  Outcome: Progressing     Problem: Knowledge Deficit  Goal: Patient/family/caregiver demonstrates understanding of disease process, treatment plan, medications, and discharge instructions  Description: Complete learning assessment and assess knowledge base    Interventions:  - Provide teaching at level of understanding  - Provide teaching via preferred learning methods  Outcome: Progressing     Problem: Prexisting or High Potential for Compromised Skin Integrity  Goal: Skin integrity is maintained or improved  Description: INTERVENTIONS:  - Identify patients at risk for skin breakdown  - Assess and monitor skin integrity  - Assess and monitor nutrition and hydration status  - Monitor labs   - Assess for incontinence   - Turn and reposition patient  - Assist with mobility/ambulation  - Relieve pressure over bony prominences  - Avoid friction and shearing  - Provide appropriate hygiene as needed including keeping skin clean and dry  - Evaluate need for skin moisturizer/barrier cream  - Collaborate with interdisciplinary team   - Patient/family teaching  - Consider wound care consult   Outcome: Progressing     Problem: MOBILITY - ADULT  Goal: Maintain or return to baseline ADL function  Description: INTERVENTIONS:  -  Assess patient's ability to carry out ADLs; assess patient's baseline for ADL function and identify physical deficits which impact ability to perform ADLs (bathing, care of mouth/teeth, toileting, grooming, dressing, etc )  - Assess/evaluate cause of self-care deficits   - Assess range of motion  - Assess patient's mobility; develop plan if impaired  - Assess patient's need for assistive devices and provide as appropriate  - Encourage maximum independence but intervene and supervise when necessary  - Involve family in performance of ADLs  - Assess for home care needs following discharge   - Consider OT consult to assist with ADL evaluation and planning for discharge  - Provide patient education as appropriate  Outcome: Progressing  Goal: Maintains/Returns to pre admission functional level  Description: INTERVENTIONS:  - Perform BMAT or MOVE assessment daily    - Set and communicate daily mobility goal to care team and patient/family/caregiver  - Collaborate with rehabilitation services on mobility goals if consulted  - Perform Range of Motion    times a day  - Reposition patient every      hours  - Dangle patient    times a day  - Stand patient  times a day  - Ambulate patient    times a day  - Out of bed to chair    times a day   - Out of bed for meals    times a day  - Out of bed for toileting  - Record patient progress and toleration of activity level   Outcome: Progressing     Problem: Nutrition/Hydration-ADULT  Goal: Nutrient/Hydration intake appropriate for improving, restoring or maintaining nutritional needs  Description: Monitor and assess patient's nutrition/hydration status for malnutrition  Collaborate with interdisciplinary team and initiate plan and interventions as ordered  Monitor patient's weight and dietary intake as ordered or per policy  Utilize nutrition screening tool and intervene as necessary  Determine patient's food preferences and provide high-protein, high-caloric foods as appropriate       INTERVENTIONS:  - Monitor oral intake, urinary output, labs, and treatment plans  - Assess nutrition and hydration status and recommend course of action  - Evaluate amount of meals eaten  - Assist patient with eating if necessary   - Allow adequate time for meals  - Recommend/ encourage appropriate diets, oral nutritional supplements, and vitamin/mineral supplements  - Order, calculate, and assess calorie counts as needed  - Recommend, monitor, and adjust tube feedings and TPN/PPN based on assessed needs  - Assess need for intravenous fluids  - Provide specific nutrition/hydration education as appropriate  - Include patient/family/caregiver in decisions related to nutrition  Outcome: Progressing

## 2023-03-07 NOTE — CASE MANAGEMENT
[facility] 48 received request for authorization from Care Manager  Authorization request for: SNF  [facility]ility Name: [facility]  NPI: [id number]  Facility MD: Dr Kenneth Larson  NPI: [id number]  Authorization initiated by contacting insurance: [facility] Via: Phone  Pending Reference #: [id number]  Clinicals submitted via:  Fax

## 2023-03-07 NOTE — PLAN OF CARE
Problem: PAIN - ADULT  Goal: Verbalizes/displays adequate comfort level or baseline comfort level  Description: Interventions:  - Encourage patient to monitor pain and request assistance  - Assess pain using appropriate pain scale  - Administer analgesics based on type and severity of pain and evaluate response  - Implement non-pharmacological measures as appropriate and evaluate response  - Consider cultural and social influences on pain and pain management  - Notify physician/advanced practitioner if interventions unsuccessful or patient reports new pain  Outcome: Progressing     Problem: INFECTION - ADULT  Goal: Absence or prevention of progression during hospitalization  Description: INTERVENTIONS:  - Assess and monitor for signs and symptoms of infection  - Monitor lab/diagnostic results  - Monitor all insertion sites, i e  indwelling lines, tubes, and drains  - Monitor endotracheal if appropriate and nasal secretions for changes in amount and color  - [place] appropriate cooling/warming therapies per order  - Administer medications as ordered  - Instruct and encourage patient and family to use good hand hygiene technique  - Identify and instruct in appropriate isolation precautions for identified infection/condition  Outcome: Progressing  Goal: Absence of fever/infection during neutropenic period  Description: INTERVENTIONS:  - Monitor WBC    Outcome: Progressing     Problem: SAFETY ADULT  Goal: Patient will remain free of falls  Description: INTERVENTIONS:  - Educate patient/family on patient safety including physical limitations  - Instruct patient to call for assistance with activity   - Consult OT/PT to assist with strengthening/mobility   - Keep Call bell within reach  - Keep bed low and locked with side rails adjusted as appropriate  - Keep care items and personal belongings within reach  - Initiate and maintain comfort rounds  - Make Fall Risk Sign visible to staff  - Apply yellow socks and bracelet for high fall risk patients  - Consider moving patient to room near nurses station  Outcome: Progressing  Goal: Maintain or return to baseline ADL function  Description: INTERVENTIONS:  -  Assess patient's ability to carry out ADLs; assess patient's baseline for ADL function and identify physical deficits which impact ability to perform ADLs (bathing, care of mouth/teeth, toileting, grooming, dressing, etc )  - Assess/evaluate cause of self-care deficits   - Assess range of motion  - Assess patient's mobility; develop plan if impaired  - Assess patient's need for assistive devices and provide as appropriate  - Encourage maximum independence but intervene and supervise when necessary  - Involve family in performance of ADLs  - Assess for home care needs following discharge   - Consider OT consult to assist with ADL evaluation and planning for discharge  - Provide patient education as appropriate  Outcome: Progressing  Goal: Maintains/Returns to pre admission functional level  Description: INTERVENTIONS:  - Perform BMAT or MOVE assessment daily    - Set and communicate daily mobility goal to care team and patient/family/caregiver     - Collaborate with rehabilitation services on mobility goals if consulted  - Out of bed for toileting  - Record patient progress and toleration of activity level   Outcome: Progressing     Problem: DISCHARGE PLANNING  Goal: Discharge to home or other facility with appropriate resources  Description: INTERVENTIONS:  - Identify barriers to discharge w/patient and caregiver  - Arrange for needed discharge resources and transportation as appropriate  - Identify discharge learning needs (meds, wound care, etc )  - Arrange for interpretive services to assist at discharge as needed  - Refer to Case Management Department for coordinating discharge planning if the patient needs post-hospital services based on physician/advanced practitioner order or complex needs related to functional status, cognitive ability, or social support system  Outcome: Progressing     Problem: Knowledge Deficit  Goal: Patient/family/caregiver demonstrates understanding of disease process, treatment plan, medications, and discharge instructions  Description: Complete learning assessment and assess knowledge base    Interventions:  - Provide teaching at level of understanding  - Provide teaching via preferred learning methods  Outcome: Progressing     Problem: Prexisting or High Potential for Compromised Skin Integrity  Goal: Skin integrity is maintained or improved  Description: INTERVENTIONS:  - Identify patients at risk for skin breakdown  - Assess and monitor skin integrity  - Assess and monitor nutrition and hydration status  - Monitor labs   - Assess for incontinence   - Turn and reposition patient  - Assist with mobility/ambulation  - Relieve pressure over bony prominences  - Avoid friction and shearing  - Provide appropriate hygiene as needed including keeping skin clean and dry  - Evaluate need for skin moisturizer/barrier cream  - Collaborate with interdisciplinary team   - Patient/family teaching  - Consider wound care consult   Outcome: Progressing     Problem: MOBILITY - ADULT  Goal: Maintain or return to baseline ADL function  Description: INTERVENTIONS:  -  Assess patient's ability to carry out ADLs; assess patient's baseline for ADL function and identify physical deficits which impact ability to perform ADLs (bathing, care of mouth/teeth, toileting, grooming, dressing, etc )  - Assess/evaluate cause of self-care deficits   - Assess range of motion  - Assess patient's mobility; develop plan if impaired  - Assess patient's need for assistive devices and provide as appropriate  - Encourage maximum independence but intervene and supervise when necessary  - Involve family in performance of ADLs  - Assess for home care needs following discharge   - Consider OT consult to assist with ADL evaluation and planning for discharge  - Provide patient education as appropriate  Outcome: Progressing  Goal: Maintains/Returns to pre admission functional level  Description: INTERVENTIONS:  - Perform BMAT or MOVE assessment daily    - Set and communicate daily mobility goal to care team and patient/family/caregiver  - Collaborate with rehabilitation services on mobility goals if consulted  - Out of bed for toileting  - Record patient progress and toleration of activity level   Outcome: Progressing     Problem: Nutrition/Hydration-ADULT  Goal: Nutrient/Hydration intake appropriate for improving, restoring or maintaining nutritional needs  Description: Monitor and assess patient's nutrition/hydration status for malnutrition  Collaborate with interdisciplinary team and initiate plan and interventions as ordered  Monitor patient's weight and dietary intake as ordered or per policy  Utilize nutrition screening tool and intervene as necessary  Determine patient's food preferences and provide high-protein, high-caloric foods as appropriate       INTERVENTIONS:  - Monitor oral intake, urinary output, labs, and treatment plans  - Assess nutrition and hydration status and recommend course of action  - Evaluate amount of meals eaten  - Assist patient with eating if necessary   - Allow adequate time for meals  - Recommend/ encourage appropriate diets, oral nutritional supplements, and vitamin/mineral supplements  - Order, calculate, and assess calorie counts as needed  - Recommend, monitor, and adjust tube feedings and TPN/PPN based on assessed needs  - Assess need for intravenous fluids  - Provide specific nutrition/hydration education as appropriate  - Include patient/family/caregiver in decisions related to nutrition  Outcome: Progressing

## 2023-03-07 NOTE — PROGRESS NOTES
PROGRESS NOTE - Family Medicine Residency Manuel Folds 1960, 57 y o  female  MRN: [id number]    Unit/Bed#: MS Ijeoma-01 Encounter: [id number]  Primary Care Provider: Frank George MD      Admission Date: 2/28/2023  Length of Stay: 6 days  Code Status:  Level 1 - Full Code  Diet: Diet Adria/CHO Controlled; Consistent Carbohydrate Diet Level 1 (4 carb servings/60 grams CHO/meal)  Consult:   IP CONSULT TO CASE MANAGEMENT      Assessment & Plan:     Discussed with [facility] team and finalization is pending attending physician attestation  Type 2 diabetes mellitus with other specified complication (HCC)  Assessment & Plan  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 as well as microalbuminuria  Worsening by daily Prednisone usage for PMR   - BS range after 20 u lantus started 150-162  - BS at goal     Plan:  - Hold home insulin for now, consider resume on discharge  - Start ISS for tighter BG control as patient resumed on home prednisone, ISS alg 5  - lantus increased to 25u qhs  - CHO ctrl diet  - Recommend close follow-up with endocrinologist outpatient    Results from last 7 days   Lab Units 03/06/23  0600 03/05/23  2208 03/05/23  2059 03/05/23  1835 03/05/23  1308 03/05/23  1019 03/05/23  0555   POC GLUCOSE mg/dl 141* 131 152* 230* 222* 264* 191*       * Weakness  Assessment & Plan  Patient reports progressively worsening generalized weakness started several month ago and was recently evicted from her apartment early Feb 2023 after sustaining a fall at home   Patient states she has difficulty ambulating, currently wheelchair dependent, and also has difficulty with standing up and sitting down from the toilet  - 2/28/23 Prealbumin 10 3 (low)  - Initial exam: 3/5 strength bilateral LEs and 4/5 strength bilateral UEs  No focal deficits  CN II-XII grossly intact  - 3/1 Rapid Response:slid out of bed and landed on bottom  No headstrike  - 3/2 PTOT: Post acute rehabilitation services    Assessment: Generalized weakness likely multifactorial in setting of nutrition, chronic diseases, and comorbidities      Plan:  - Nutrition supplement with Glucerna 3 times daily with meals  - Fall precaution  - CM consulted for STR placement per PT/OT rec    Polymyalgia rheumatica ([facility] [place] [address] )  Assessment & Plan  H/O polymyalgia rheumatica  - Patient reports has not been taking any of her medication for 1-2 weeks due to not poor diet and not feeling well  - Home regimen: Prednisone 4mg daily (became immoble on 2mg dose due to severe aches/pains)  - Follows outpatient neurology (last visit 6/9/22) and rheumatology (last visit 2/8/22)    Plan:  - Resume home medication  - Monitor BG    Essential hypertension  Assessment & Plan  Initial /79  - Patient reports has not been taking any of her medication for 1-2 weeks due to not poor diet and not feeling well  - Home regimen: Losartan 100 mg daily, metoprolol tartrate 50 mg twice daily  - Follows cardiology outpatient, last visit 4/27/22    Assessment:  - Poorly controlled BP on admission, likely due to not taking her medications  - Goal BP < 140/90  - Most recent Blood Pressure: 966/11   - Systolic (33MTY), JOL:748 , Min:138 , KBP:841   - Diastolic (36WKK), XCH:07, Min:76, Max:88    Plan:  - Resume home meds  - Labetalol 10 mg Q6H PRN for SBP > 180  - Vitals per unit routine    Diarrhea  Assessment & Plan  -pt with 1 week of nonbloody, liquid diarrhea, which she reports began just prior to admission to hospital; reports it is worsening now with several episodes daily   -was treated with IV Abx during hospitalization in early Feb 2023, will need to r/o c diff  -electrolytes WNL    Plan  C diff cx pending, initiate flagyl if positive   Can trial imodium if C  Diff negative   Continue to trend electrolytes, encourage po fluid intake    Cough  Assessment & Plan  Patient reports acute nonproductive cough started several weeks ago a/w exertional dyspnea  Also noted chronic bilateral lower extremity edema started several years ago  - 2/14/23 LE doppler: No evidence of acute or chronic DVT  - 2/17/23 CXR: Cardiomegaly with increased bilateral opacities likely representing CHF  - 2/28/23 COVID/flu/RSV negative  - 2/28/23 ProBNP 95  - Follows outpatient cardiology, last visit 4/27/22  - Initial exam: +Bibasilar rales  Coarse lung sounds  No wheezing or crackles noted  Bilateral 3+ pitting edema with venous stasis dermatitis and very dry skin  - 3/1/23 ECHO: LVEF 60%  Normal systolic function  Normal wall motion  G1DD  Pulmonary artery systolic pressure is mildly increased    Assessment: Acute cough could be secondary to viral respiratory infection  However cannot r/o fluid overloaded state in setting of bilateral LE edema in bibasilar rales  DDx includes hypoalbuminemia vs venous insufficiency vs CHF    Plan:  - Gentle diuresis as renally tolerated  - Monitor I&O    Bilateral lower extremity edema  Assessment & Plan  Please see detailed A&P under "Acute cough"      Homelessness  Assessment & Plan  Patient reports both her and her son have been homelessness since Feb 2023 after eviction from her apartment    - CM consulted for social support - sending STR blanket referral, local [facility] group contact given     Abnormal urinalysis  Assessment & Plan  H/O abnormal UA with negative urine culture in the past  - Patient denies any abdominal pain or dysuria  - 2/28/23 UA: >300 protein, 500 glucose, 1+ ketone, small blood  - 2/28/23 Urine micro: innumerable RBC and WBC  - 2/28/23 > 100K of Candida species    Plan:  - Monitor clinically off ABX for asymptomatic bacteruria       Results from last 7 days   Lab Units 02/28/23 2010   URINE CULTURE  >100,000 cfu/ml Candida glabrata*  30,000-39,000 cfu/ml       Obstructive sleep apnea  Assessment & Plan  H/O MOHAN, however has not been using her CPAP due to being homeless  -Follows sleep medicine outpatient, last visit was 722    Plan:  - Continue CPAP nightly    Stage 3a chronic kidney disease ([facility] [address] )  Assessment & Plan  Initial sCr 1 16  - Baseline sCr 1 0-1 1    Assessment: stable CKD-3    Plan:  - Monitor renal function  - Avoid nephrotoxic drugs    Results from last 7 days   Lab Units 03/06/23  0439 03/05/23  0444 03/04/23  0435 03/03/23  0430 03/02/23  0437 03/01/23  0415 02/28/23  1639   BUN mg/dL 19 16 11 10 10 11 10   CREATININE mg/dL 1 07 1 03 0 96 1 02 1 02 1 08 1 16   EGFR ml/min/1 73sq m 54 57 62 58 58 54 49       Depressed mood  Assessment & Plan  Patient reports depressed mood since evicted from her apartment  - 3/3/23 PHQ-9 score 10 (moderate depression) without SH/SI   - In setting of living situation, recent eviction, and significant comorbidity cant rule out adjustment disorder/cyclothymia     Plan:  - I have discussed with patient different treatments for her depressed moods including starting SSRI  However, patient declines initiating any therapy at this time  Patient states she "has never thoughts of need to be treated for mental health" and reports her depression is manageable off medication at this time  I have discussed with patient that medication can be initiated at a later time if she desires  Cerebral aneurysm without rupture  Assessment & Plan  Patient denies any headache, dizziness, nausea, vomiting, or any focal neurological symptoms  - 3/7/22 CTA head: no acute intracranial disease, volume loss greater than expected for age  Two, 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  - 2/8/23 CT head: no acute intracranial abnormality   - Follows outpatient neurosurgery, last visit  3/16/22    Plan:  - Monitor clinically  - Low threshold to consult neurosurgery if patient develops severe headache or new neurological symptoms    Mild intermittent asthma without complication  Assessment & Plan  Continue home Symbicort for mild intermittent asthma  - Denies any chest tightness  - No active wheezing on exam    Prolonged QT interval  Assessment & Plan  QTc 507 on 2/28/23 EKG    Class 3 severe obesity with serious comorbidity and body mass index (BMI) of 50 0 to 59 9 in adult [facility])  Assessment & Plan  Secondary to dietary indiscretion  BMP 50 94  Plan:   CHO ctrl diet     Hyperlipidemia  Assessment & Plan  H/O HLD  - Patient reports has not been taking any of her medication for 1-2 weeks due to not poor diet and not feeling well  - Home regimen: Atorvastatin 40mg QD  - 5/7/21 lipid panel: Chol 164, , HDL 45, LDL 96    Assessment: ASCVD 23 5%    Plan:  - Continue home meds  - Will recheck fasting lipid panel    Lab Results   Component Value Date    CHOLESTEROL 157 03/01/2023    TRIG 117 03/01/2023    HDL 31 (L) 03/01/2023    LDLCALC 103 (H) 03/01/2023       Anemia  Assessment & Plan  Initial Hgb 11 3  - H/O chronic anemia not on home FeSO4 supplement  - 2/28/23 iron panel: Fe 40, FeSat 15%, TIBC 266, Ferritin 50    Assessment: Chronic anemia likely secondary to chronic disease vs CKD vs iron deficiency   Low suspicion for acute blood loss    Plan:  - Monitor Hgb  - Start Fe supplement  - Miralax PRN for iron-induced constipation  - Will check B12, folate    Results from last 7 days   Lab Units 03/06/23  0439 03/05/23  0444 03/04/23  0435 03/03/23  0430 03/02/23  0437 03/01/23  0415 02/28/23  1639   HEMOGLOBIN g/dL 11 1* 10 6* 10 5* 10 3* 9 7* 9 9* 11 3*   HEMATOCRIT % 37 8 35 7 35 2 34 4* 33 8* 33 8* 37 1       Aortic dilatation (HCC)  Assessment & Plan  H/O Stable ascending thoracic aortic aneurysm  - Patient denies any chest pain on admission  - 2/18/22 CT chest: stable ectasia of the ascending thoracic aorta measuring 4 2 cm  - Follows cardiology outpatient, last visit 4/27/22    Plan:  - Monitor clinically  - Low threshold for repeat CT chest w/ contrast if patient develops acute onset chest pain      Principal Problem:    Weakness  Active Problems:    Type 2 diabetes mellitus with other specified complication ([facility])    Essential hypertension    Polymyalgia rheumatica (HCC)    Aortic dilatation (HCC)    Anemia    Hyperlipidemia    Class 3 severe obesity with serious comorbidity and body mass index (BMI) of 50 0 to 59 9 in adult ([facility])    Prolonged QT interval    Mild intermittent asthma without complication    Cerebral aneurysm without rupture    Depressed mood    Stage 3a chronic kidney disease ([facility])    Obstructive sleep apnea    Abnormal urinalysis    Homelessness    Bilateral lower extremity edema    Cough    Diarrhea      VTE Pharm PPX: Enoxaparin (Lovenox)  VTE Mech PPX: sequential compression device and/or foot pump applied unless otherwise contraindicated    Subjective     Hospital Course & 24hr events:     Overnight/24hr events:  Overnight events: Pt with several episodes of pure liquid diarrhea, pt reports she was sitting on the toilet for 4 hours last night  Concerns per nursing staff: none  Patient seen and examined at bedside, continues to complain of abdominal cramping, which she reports is different from her usual lactose intolerance pain/cramps  Review of Systems:     Review of Systems   Constitutional: Negative for chills and fever  HENT: Negative for ear pain and sore throat  Eyes: Negative for pain and visual disturbance  Respiratory: Negative for cough and shortness of breath  Cardiovascular: Negative for chest pain and palpitations  Gastrointestinal: Negative for abdominal pain and vomiting  Genitourinary: Negative for dysuria and hematuria     Musculoskeletal: Negative for arthralgias and back pain    Skin: Negative for color change and rash  Neurological: Negative for seizures and syncope  All other systems reviewed and are negative  Objective     Vitals:     Vitals:    03/05/23 2206 03/06/23 0713 03/06/23 1513 03/06/23 2106   BP: 162/88 139/76 138/76 135/77   Pulse: 84 84 76 84   Resp:       Temp: 98 4 °F (36 9 °C) 98 6 °F (37 °C) 98 4 °F (36 9 °C) 98 8 °F (37 1 °C)   TempSrc:       SpO2: 97% 94% 96% 96%   Weight:       Height:         Temp:  [98 4 °F (36 9 °C)-98 8 °F (37 1 °C)] 98 8 °F (37 1 °C)  HR:  [76-84] 84  BP: (135-138)/(76-77) 135/77  Weight (last 2 days)     None        No intake or output data in the 24 hours ending 03/07/23 0832  Invasive Devices     Peripheral Intravenous Line  Duration           Peripheral IV 03/04/23 Left;Proximal;Ventral (anterior) Forearm 3 days                  Labs: I have personally reviewed pertinent reports        Results from last 7 days   Lab Units 03/07/23 0441 03/06/23 0439 03/05/23  0444 03/04/23  0435 03/03/23  0430 03/02/23 0437 03/01/23 0415 02/28/23  1639   WBC Thousand/uL 14 38* 13 52* 12 38* 11 62* 12 09* 11 98* 10 97* 9 73   HEMOGLOBIN g/dL 10 6* 11 1* 10 6* 10 5* 10 3* 9 7* 9 9* 11 3*   HEMATOCRIT % 35 5 37 8 35 7 35 2 34 4* 33 8* 33 8* 37 1   PLATELETS Thousands/uL 452* 437* 422* 424* 383 372 390 437*   NEUTROS ABS Thousands/µL  --   --  7 81* 7 11 7 87*  --   --  6 89       Results from last 7 days   Lab Units 03/07/23  0441 03/06/23  0439 03/05/23  0444 03/04/23  0435 03/03/23  0430 03/02/23  0437 03/01/23  0415 02/28/23  1639   POTASSIUM mmol/L 3 6 4 1 4 3 3 3* 3 0* 3 2* 3 5 3 6   CHLORIDE mmol/L 106 107 107 108 107 105 102 99   CO2 mmol/L 26 30 29 29 30 29 27 29   BUN mg/dL 24 19 16 11 10 10 11 10   CREATININE mg/dL 1 22 1 07 1 03 0 96 1 02 1 02 1 08 1 16   CALCIUM mg/dL 8 7 8 9 9 3 9 3 9 0 8 8 8 7 9 5   AST U/L  --   --  17 15 11  --   --  18   ALT U/L  --   --  14 12 10*  --   --  16   ALK PHOS U/L  --   --  118* 128* 128*  -- --  165*   EGFR ml/min/1 73sq m 46 54 57 62 58 58 54 49   PHOSPHORUS mg/dL  --   --   --  2 9  --   --   --   --        Results from last 7 days   Lab Units 03/03/23  0430   CRP mg/L 34 7*       Lab Results   Component Value Date/Time    Blood Culture No Growth After 5 Days  02/11/2023 02:08 AM    Blood Culture No Growth After 5 Days  02/11/2023 01:06 AM    Urine Culture >100,000 cfu/ml Candida glabrata (A) 02/28/2023 08:10 PM    Urine Culture 30,000-39,000 cfu/ml 02/28/2023 08:10 PM         EKG, Pathology, Imaging, and Other Studies:   I have personally reviewed pertinent reports  XR chest portable    Result Date: 3/2/2023  Impression: Improvement in pulmonary vascular congestion with minimal edema  Stable cardiomegaly    Workstation performed: [id number]         Meds/Allergies     All medications and allergies reviewed  No Known Allergies    Continuous Infusions:       Scheduled Meds:  Current Facility-Administered Medications   Medication Dose Route Frequency Provider Last Rate   • acetaminophen  975 mg Oral Q8H PRN Arvid Fabio, DO     • aspirin  81 mg Oral Daily Pauline Yu MD     • atorvastatin  40 mg Oral Daily Pauline Yu MD     • budesonide-formoterol  1 puff Inhalation BID Pauline Yu MD     • enoxaparin  60 mg Subcutaneous Daily Pauline Yu MD     • ferrous sulfate  325 mg Oral BID With Meals Pauline Yu MD     • insulin glargine  30 Units Subcutaneous HS Pauline Yu MD     • insulin lispro  4-20 Units Subcutaneous 4 times day Pauline Yu MD     • labetalol  10 mg Intravenous Q6H PRN Pauline Yu MD     • lactase  6,000 Units Oral TID With Meals Pauline Yu MD     • losartan  100 mg Oral Daily Pauline Yu MD     • metFORMIN  1,000 mg Oral BID With Meals Pauline Yu MD     • metoprolol tartrate  50 mg Oral Q12H [address] Pauline Yu MD     • predniSONE  4 mg Oral Daily Pauline Yu MD         PRN Meds:  •  acetaminophen  •  labetalol      Physical Exam   Physical Exam  Constitutional:       General: She is not in acute distress  Appearance: Normal appearance  She is obese  She is not ill-appearing or toxic-appearing  HENT:      Right Ear: External ear normal       Left Ear: External ear normal       Mouth/Throat:      Mouth: Mucous membranes are moist       Pharynx: Oropharynx is clear  Eyes:      General: No scleral icterus  Conjunctiva/sclera: Conjunctivae normal    Cardiovascular:      Rate and Rhythm: Normal rate and regular rhythm  Heart sounds: Normal heart sounds  No murmur heard  Pulmonary:      Effort: Pulmonary effort is normal  No respiratory distress  Breath sounds: Normal breath sounds  No wheezing, rhonchi or rales  Abdominal:      General: Bowel sounds are increased  Palpations: Abdomen is soft  Tenderness: There is abdominal tenderness (diffuse tenderness to light palpation)  There is no guarding or rebound  Musculoskeletal:      Right lower leg: No edema  Left lower leg: No edema  Skin:     General: Skin is warm and dry  Coloration: Skin is not jaundiced  Neurological:      General: No focal deficit present  Mental Status: She is alert and oriented to person, place, and time     Psychiatric:         Mood and Affect: Mood normal          Behavior: Behavior normal               Wilber Granda, DO  PGY-2, [facility]  03/07/23, 8:32 AM

## 2023-03-07 NOTE — CASE MANAGEMENT
Case Management Discharge Planning Note    Patient name Yulia Wiggins  Location /-54 MRN [id number]  : 1960 Date 3/7/2023       Current Admission Date: 2023  Current Admission Diagnosis:Weakness   Patient Active Problem List    Diagnosis Date Noted   • Diarrhea 2023   • Bilateral lower extremity edema 2023   • Cough 2023   • Homelessness 2023   • Sepsis ([place] [address] ) 2023   • Abnormal urinalysis 2023   • Weakness 2023   • Intertrigo 2023   • Leg numbness 2023   • Unsteadiness on feet 2022   • Chronic migraine without aura, not intractable, without status migrainosus 2022   • Obstructive sleep apnea 2022   • Diabetic neuropathy ([place] [address] ) 2022   • Stage 3a chronic kidney disease ([place] [address] ) 2022   • Polymyalgia rheumatica ([place] [address] ) 2021   • Gait instability 2021   • Ulnar neuropathy of right upper extremity    • Blurry vision, bilateral 2021   • Depressed mood 10/08/2020   • Essential hypertension 09/15/2020   • Type 2 diabetes mellitus with other specified complication ([place] [address] )    • Chronic migraine without aura without status migrainosus, not intractable 2020   • Hypersomnia 2020   • Migraine without aura and without status migrainosus, not intractable 2020   • Cerebral aneurysm without rupture 2020   • History of absence seizures 2020   • Thyroid nodule 2020   • Aneurysm ([place] [address] ) 2020   • Type 2 diabetes mellitus with hyperglycemia, with long-term current use of insulin ([place] [address] ) 2020   • Left cavernous carotid aneurysm 02/10/2020   • Polyneuropathy associated with underlying disease ([place] [address] ) 2020   • PMR (polymyalgia rheumatica) ([place] [address] ) 2020   • Thrombocytosis 2020   • Morbid obesity ([place] [address] ) 2019   • Other inflammatory and immune myopathies, not elsewhere classified 2019   • Transaminitis 2019   • Frequent headaches 2019   • Type 2 diabetes mellitus with microalbuminuria, with long-term current use of insulin ([facility] [address] ) 02/01/2019   • Mild intermittent asthma without complication 78/15/7266   • Orthostatic hypotension 06/25/2018   • Lung nodules 03/22/2018   • Exertional dyspnea 02/13/2018   • Enlarged pulmonary artery (HCC) 02/13/2018   • Aortic dilatation (HCC) 02/13/2018   • 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, with long-term current use of insulin    • Seizures ([facility] [address] )    • Obstructive sleep apnea (adult) (pediatric) 12/18/2017   • Prolonged QT interval 12/18/2017   • Decreased pulses in feet 12/11/2017   • Microalbuminuria 09/08/2015   • Vitamin D deficiency 06/06/2014   • Visual impairment in both eyes 12/06/2012   • Anemia 03/20/2012   • Hyperlipidemia 03/20/2012   • Class 3 severe obesity with serious comorbidity and body mass index (BMI) of 50 0 to 59 9 in adult [facility]) 03/20/2012   • Neuropathy of hand, right 03/20/2012      LOS (days): 6  Geometric Mean LOS (GMLOS) (days): 2 90  Days to GMLOS:-3     OBJECTIVE:  Risk of Unplanned Readmission Score: 23 81         Current admission status: Inpatient   Preferred Pharmacy:   [address] [facility] [place] [address]  Phone: [phone] Fax: [phone]    [facility] ([address] [place] [address],   [address] [facility] [address]  [address] [place] [address]  Phone: [phone] Fax: [phone]    Primary Care Provider: Shavonne Zimmerman MD    Primary Insurance: [facility] [address] [facility] [address] [facility]  Secondary Insurance:     DISCHARGE DETAILS:    Discharge planning discussed with[de-identified] Patient and Son  Freedom of Choice: Yes  Comments - Freedom of Choice: [facility] offering sTR only bed (No LTC avaiable)  Pt informed and verbalized understanding    CM contacted family/caregiver?: Yes  Were Treatment Team discharge recommendations reviewed with patient/caregiver?: Yes  Did patient/caregiver verbalize understanding of patient care needs?: N/A- going to facility  Were patient/caregiver advised of the risks associated with not following Treatment Team discharge recommendations?: Yes    Contacts  Patient Contacts: Son Jostin Left at bedside  Relationship to Patient[de-identified] Family  Contact Method: In Person  Reason/Outcome: Continuity of Care, Emergency Contact, Discharge [address]         Is the patient interested in [address] at discharge?: No    DME Referral Provided  Referral made for DME?: No    Other Referral/Resources/Interventions Provided:  Interventions: Short Term Rehab         Treatment Team Recommendation: Short Term Rehab                                            Additional Comments: MAI received TC from [address] [place] [address] she emailed CM a list of [facility] STR's

## 2023-03-07 NOTE — OCCUPATIONAL THERAPY NOTE
Occupational Therapy Treatment Note     03/07/23 1431   OT Last Visit   OT Visit Date 03/07/23   Note Type   Note Type Treatment for insurance authorization   Pain Assessment   Pain Assessment Tool 0-10   Pain Score No Pain   Restrictions/Precautions   Weight Bearing Precautions Per Order No   Other Precautions Fall Risk;Telemetry;Multiple lines   Lifestyle   Autonomy Requires assistance with ADLs, IADLs, driving, and uses cane and walker for functional mobility   Reciprocal Relationships Lives with her son   Service to Others Continue to assess   Intrinsic Gratification Enjoys going to [employer]   ADL   Where Assessed Edge of bed   Grooming Assistance 5  Supervision/Setup   Grooming Deficit Setup; Increased time to complete;Verbal cueing;Teeth care   Grooming Comments pt was able to complete task including opening items for task   Bed Mobility   Supine to Sit 4  Minimal assistance   Additional items Assist x 1   Sit to Supine 3  Moderate assistance   Additional items Assist x 1   Transfers   Sit to Stand 3  Moderate assistance   Additional items Assist x 1   Stand to Sit 3  Moderate assistance   Additional items Assist x 1   Functional Mobility   Functional Mobility 4  Minimal assistance   Additional Comments pt was able to take side steps toward the head of the bed   Additional items Rolling walker   Subjective   Subjective "I am legally blind, my son helps me"   Cognition   Overall Cognitive Status Impaired   Arousal/Participation Cooperative;Arousable   Attention Attends with cues to redirect   Orientation Level Oriented X4   Memory Unable to assess   Following Commands Follows one step commands without difficulty   Activity Tolerance   Activity Tolerance Patient tolerated treatment well   Assessment   Assessment Pt seen for participation in Occupational Therapy session with focus on activity tolerance, bed mob, functional transfers,  sitting balance and tolerance for pt engagement in UB/LB self-care tasks and energy conservation techniques  Pt cleared by RN/ for pt participated in OT session  Pt presented sitting out of bed to bedside chair/supine/HOB raised pt awake/alert and agreeable to participate in therapy following pt identifiers confirmed  Pt reported her therapy goal to return home to her son  Pt required assist for bed mob and functional transfers 2* decreased overall strength and pt deconditioning  sHe was able to tolerate sitting at edge of bed for grooming well with no complaints of pain or fatigue  Pt will require post acute rehab service to continue to address pt noted deficits which currently impair pt ADL and functional mob  Pt return to bed post session,    all needs within reach     Plan   Treatment Interventions ADL retraining   Goal Expiration Date 03/16/23   OT Treatment Day 1   OT Frequency 2-3x/wk   Recommendation   OT Discharge Recommendation Post acute rehabilitation services   AM-PAC Daily Activity Inpatient   Lower Body Dressing 2   Bathing 2   Toileting 2   Upper Body Dressing 3   Grooming 3   Eating 3   Daily Activity Raw Score 15   Daily Activity Standardized Score (Calc for Raw Score >=11) 34 69   AM-PAC Applied Cognition Inpatient   Following a Speech/Presentation 2   Understanding Ordinary Conversation 4   Taking Medications 3   Remembering Where Things Are Placed or Put Away 3   Remembering List of 4-5 Errands 2   Taking Care of Complicated Tasks 2   Applied Cognition Raw Score 16   Applied Cognition Standardized Score 35 03   Barthel Index   Feeding 10   Bathing 0   Grooming Score 5   Dressing Score 10   Bladder Score 10   Bowels Score 10   Toilet Use Score 10   Transfers (Bed/Chair) Score 15   Mobility (Level Surface) Score 15   Stairs Score 0   Barthel Index Score 85       Walker Kayleen LEEA/L

## 2023-03-07 NOTE — ASSESSMENT & PLAN NOTE
-pt with 1 week of nonbloody, liquid diarrhea, which she reports began just prior to admission to hospital; reports it is worsening now with several episodes daily   -was treated with IV Abx during hospitalization in early Feb 2023, will need to r/o c diff  -electrolytes WNL  -3/6: C   Diff negative     Plan  · Immodium 2mg QID prn  · Continue to trend electrolytes, encourage po fluid intake

## 2023-03-07 NOTE — PLAN OF CARE
Problem: OCCUPATIONAL THERAPY ADULT  Goal: Performs self-care activities at highest level of function for planned discharge setting  See evaluation for individualized goals  Description: Treatment Interventions: ADL retraining, Visual perceptual retraining, Functional transfer training, Endurance training, Cognitive reorientation, Patient/family training, Equipment evaluation/education, Continued evaluation, Activityengagement          See flowsheet documentation for full assessment, interventions and recommendations  Outcome: Progressing  Note: Limitation: Decreased ADL status, Decreased cognition, Decreased endurance, Decreased self-care trans, Decreased high-level ADLs  Prognosis: Fair  Assessment: Pt seen for participation in Occupational Therapy session with focus on activity tolerance, bed mob, functional transfers,  sitting balance and tolerance for pt engagement in UB/LB self-care tasks and energy conservation techniques  Pt cleared by RN/ for pt participated in OT session  Pt presented sitting out of bed to bedside chair/supine/HOB raised pt awake/alert and agreeable to participate in therapy following pt identifiers confirmed  Pt reported her therapy goal to return home to her son  Pt required assist for bed mob and functional transfers 2* decreased overall strength and pt deconditioning  sHe was able to tolerate sitting at edge of bed for grooming well with no complaints of pain or fatigue  Pt will require post acute rehab service to continue to address pt noted deficits which currently impair pt ADL and functional mob  Pt return to bed post session,    all needs within reach       OT Discharge Recommendation: Post acute rehabilitation services

## 2023-03-08 VITALS
HEART RATE: 89 BPM | DIASTOLIC BLOOD PRESSURE: 75 MMHG | HEIGHT: 68 IN | OXYGEN SATURATION: 94 % | TEMPERATURE: 98.8 F | WEIGHT: 293 LBS | BODY MASS INDEX: 44.41 KG/M2 | RESPIRATION RATE: 18 BRPM | SYSTOLIC BLOOD PRESSURE: 142 MMHG

## 2023-03-08 LAB
ANION GAP SERPL CALCULATED.3IONS-SCNC: 4 MMOL/L (ref 4–13)
BUN SERPL-MCNC: 21 MG/DL (ref 5–25)
CALCIUM SERPL-MCNC: 8.8 MG/DL (ref 8.3–10.1)
CHLORIDE SERPL-SCNC: 109 MMOL/L (ref 96–108)
CO2 SERPL-SCNC: 26 MMOL/L (ref 21–32)
CREAT SERPL-MCNC: 1.02 MG/DL (ref 0.6–1.3)
ERYTHROCYTE [DISTWIDTH] IN BLOOD BY AUTOMATED COUNT: 17.2 % (ref 11.6–15.1)
GFR SERPL CREATININE-BSD FRML MDRD: 58 ML/MIN/1.73SQ M
GLUCOSE SERPL-MCNC: 161 MG/DL (ref 65–140)
GLUCOSE SERPL-MCNC: 164 MG/DL (ref 65–140)
HCT VFR BLD AUTO: 34.8 % (ref 34.8–46.1)
HGB BLD-MCNC: 10.4 G/DL (ref 11.5–15.4)
MCH RBC QN AUTO: 23.4 PG (ref 26.8–34.3)
MCHC RBC AUTO-ENTMCNC: 29.9 G/DL (ref 31.4–37.4)
MCV RBC AUTO: 78 FL (ref 82–98)
PLATELET # BLD AUTO: 456 THOUSANDS/UL (ref 149–390)
PMV BLD AUTO: 10.8 FL (ref 8.9–12.7)
POTASSIUM SERPL-SCNC: 3.7 MMOL/L (ref 3.5–5.3)
RBC # BLD AUTO: 4.45 MILLION/UL (ref 3.81–5.12)
SODIUM SERPL-SCNC: 139 MMOL/L (ref 135–147)
WBC # BLD AUTO: 12.29 THOUSAND/UL (ref 4.31–10.16)

## 2023-03-08 RX ORDER — ALBUTEROL SULFATE 90 UG/1
2 AEROSOL, METERED RESPIRATORY (INHALATION) EVERY 6 HOURS PRN
Qty: 6.7 G | Refills: 0
Start: 2023-03-08 | End: 2023-03-22

## 2023-03-08 RX ORDER — PREDNISONE 1 MG/1
4 TABLET ORAL DAILY
Qty: 30 TABLET | Refills: 0 | Status: SHIPPED | OUTPATIENT
Start: 2023-03-08

## 2023-03-08 RX ORDER — INSULIN GLARGINE 100 [IU]/ML
30 INJECTION, SOLUTION SUBCUTANEOUS
Qty: 10 ML | Refills: 0 | Status: SHIPPED | OUTPATIENT
Start: 2023-03-08

## 2023-03-08 RX ORDER — LOPERAMIDE HYDROCHLORIDE 2 MG/1
2 CAPSULE ORAL 4 TIMES DAILY PRN
Qty: 30 CAPSULE | Refills: 0 | Status: SHIPPED | OUTPATIENT
Start: 2023-03-08

## 2023-03-08 RX ADMIN — PREDNISONE 4 MG: 1 TABLET ORAL at 10:54

## 2023-03-08 RX ADMIN — ATORVASTATIN CALCIUM 40 MG: 40 TABLET, FILM COATED ORAL at 10:54

## 2023-03-08 RX ADMIN — INSULIN LISPRO 4 UNITS: 100 INJECTION, SOLUTION INTRAVENOUS; SUBCUTANEOUS at 10:54

## 2023-03-08 RX ADMIN — Medication 6000 UNITS: at 10:54

## 2023-03-08 RX ADMIN — METOPROLOL TARTRATE 50 MG: 50 TABLET, FILM COATED ORAL at 10:54

## 2023-03-08 RX ADMIN — ASPIRIN 81 MG: 81 TABLET, COATED ORAL at 10:54

## 2023-03-08 RX ADMIN — ENOXAPARIN SODIUM 60 MG: 60 INJECTION SUBCUTANEOUS at 10:55

## 2023-03-08 RX ADMIN — LOPERAMIDE HYDROCHLORIDE 2 MG: 2 CAPSULE ORAL at 06:15

## 2023-03-08 RX ADMIN — FERROUS SULFATE TAB 325 MG (65 MG ELEMENTAL FE) 325 MG: 325 (65 FE) TAB at 10:54

## 2023-03-08 RX ADMIN — LOSARTAN POTASSIUM 100 MG: 50 TABLET, FILM COATED ORAL at 10:54

## 2023-03-08 RX ADMIN — BUDESONIDE AND FORMOTEROL FUMARATE DIHYDRATE 1 PUFF: 160; 4.5 AEROSOL RESPIRATORY (INHALATION) at 10:54

## 2023-03-08 NOTE — DISCHARGE SUMMARY
DISCHARGE SUMMARY - Family Medicine Residency, [facility] 1960, 57 y o  female  MRN: [id number]    Unit/Bed#: MS Raphael-01 Encounter: [id number]  Primary Care Provider: Michael Iverson MD      Admission Date: 3/01/23  Discharge Date: 03/08/23  Length of Stay: 7 days  Diagnosis:   Principal Problem:    Weakness  Active Problems:    Type 2 diabetes mellitus with other specified complication (HCC)    Essential hypertension    Polymyalgia rheumatica (HCC)    Aortic dilatation (HCC)    Anemia    Hyperlipidemia    Class 3 severe obesity with serious comorbidity and body mass index (BMI) of 50 0 to 59 9 in [facility])    Prolonged QT interval    Mild intermittent asthma without complication    Cerebral aneurysm without rupture    Depressed mood    Stage 3a chronic kidney disease (HCC)    Obstructive sleep apnea    Abnormal urinalysis    Homelessness    Bilateral lower extremity edema    Cough    Diarrhea        ASSESSMENTS & PLANS:   Plans discussed with [facility] team and finalization is pending attending physician attestation  Type 2 diabetes mellitus with other specified complication (HCC)  Assessment & Plan  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 as well as microalbuminuria   Worsening by daily Prednisone usage for PMR   - BS range after 20 u lantus started 150-162  - BS at goal     Plan:  - Hold home insulin for now, consider resume on discharge  - Start ISS for tighter BG control as patient resumed on home prednisone, ISS alg 5  - lantus increased to 30u qhs on 3/7  - CHO ctrl diet  - Recommend close follow-up with endocrinologist outpatient    Results from last 7 days   Lab Units 03/08/23  0631 03/07/23  2100 03/07/23  1807 03/07/23  1532 03/07/23  1153 03/07/23  0947 03/07/23  0616   POC GLUCOSE mg/dl 164* 220* 211* 239* 224* 205* 136       * Weakness  Assessment & Plan  Patient reports progressively worsening generalized weakness started several month ago and was recently evicted from her apartment early Feb 2023 after sustaining a fall at home  Patient states she has difficulty ambulating, currently wheelchair dependent, and also has difficulty with standing up and sitting down from the toilet  - 2/28/23 Prealbumin 10 3 (low)  - Initial exam: 3/5 strength bilateral LEs and 4/5 strength bilateral UEs  No focal deficits  CN II-XII grossly intact  - 3/1 Rapid Response:slid out of bed and landed on bottom  No headstrike  - 3/2 PTOT: Post acute rehabilitation services    Assessment: Generalized weakness likely multifactorial in setting of nutrition, chronic diseases, and comorbidities      Plan:  - Fall precaution  - CM consulted for STR placement per PT/OT rec    Polymyalgia rheumatica ([facility] [address] )  Assessment & Plan  H/O polymyalgia rheumatica  - Patient reports has not been taking any of her medication for 1-2 weeks due to not poor diet and not feeling well  - Home regimen: Prednisone 4mg daily (became immoble on 2mg dose due to severe aches/pains)  - Follows outpatient neurology (last visit 6/9/22) and rheumatology (last visit 2/8/22)    Plan:  - Resume home medication  - Monitor BG    Essential hypertension  Assessment & Plan  Initial /79  - Patient reports has not been taking any of her medication for 1-2 weeks due to not poor diet and not feeling well  - Home regimen: Losartan 100 mg daily, metoprolol tartrate 50 mg twice daily  - Follows cardiology outpatient, last visit 4/27/22    Assessment:  - Poorly controlled BP on admission, likely due to not taking her medications  - Goal BP < 140/90  - Most recent Blood Pressure: 559/99   - Systolic (27NQS), SQP:289 , Min:133 , SFI:646   - Diastolic (01ZPV), KVY:18, Min:73, Max:76    Plan:  - Resume home meds  - Labetalol 10 mg Q6H PRN for SBP > 180  - Vitals per unit routine    Diarrhea  Assessment & Plan  -pt with 1 week of nonbloody, liquid diarrhea, which she reports began just prior to admission to hospital; reports it is worsening now with several episodes daily   -was treated with IV Abx during hospitalization in early Feb 2023, will need to r/o c diff  -electrolytes WNL  -3/6: C  Diff negative     Plan  · Immodium 2mg QID prn  · Continue to trend electrolytes, encourage po fluid intake    Cough  Assessment & Plan  Patient reports acute nonproductive cough started several weeks ago a/w exertional dyspnea  Also noted chronic bilateral lower extremity edema started several years ago  - 2/14/23 LE doppler: No evidence of acute or chronic DVT  - 2/17/23 CXR: Cardiomegaly with increased bilateral opacities likely representing CHF  - 2/28/23 COVID/flu/RSV negative  - 2/28/23 ProBNP 95  - Follows outpatient cardiology, last visit 4/27/22  - Initial exam: +Bibasilar rales  Coarse lung sounds  No wheezing or crackles noted  Bilateral 3+ pitting edema with venous stasis dermatitis and very dry skin  - 3/1/23 ECHO: LVEF 60%  Normal systolic function  Normal wall motion  G1DD  Pulmonary artery systolic pressure is mildly increased    Assessment: Acute cough could be secondary to viral respiratory infection  However cannot r/o fluid overloaded state in setting of bilateral LE edema in bibasilar rales  DDx includes hypoalbuminemia vs venous insufficiency vs CHF    Plan:  - Gentle diuresis as renally tolerated  - Monitor I&O    Bilateral lower extremity edema  Assessment & Plan  Please see detailed A&P under "Acute cough"      Homelessness  Assessment & Plan  Patient reports both her and her son have been homelessness since Feb 2023 after eviction from her apartment    - CM consulted for social support - sending STR blanket referral, local [employer] group contact given     Abnormal urinalysis  Assessment & Plan  H/O abnormal UA with negative urine culture in the past  - Patient denies any abdominal pain or dysuria  - 2/28/23 UA: >300 protein, 500 glucose, 1+ ketone, small blood  - 2/28/23 Urine micro: innumerable RBC and WBC  - 2/28/23 > 100K of Candida species    Plan:  - Monitor clinically off ABX for asymptomatic bacteruria       Results from last 7 days   Lab Units 02/28/23  2010   URINE CULTURE  >100,000 cfu/ml Candida glabrata*  30,000-39,000 cfu/ml       Obstructive sleep apnea  Assessment & Plan  H/O MOHAN, however has not been using her CPAP due to being homeless  -Follows sleep medicine outpatient, last visit was 722    Plan:  - Continue CPAP nightly    Stage 3a chronic kidney disease ([facility] [address] )  Assessment & Plan  Initial sCr 1 16  - Baseline sCr 1 0-1 1    Assessment: stable CKD-3    Plan:  - Monitor renal function  - Avoid nephrotoxic drugs    Results from last 7 days   Lab Units 03/08/23  0611 03/07/23  0441 03/06/23  0439 03/05/23  0444 03/04/23  0435 03/03/23  0430 03/02/23  0437   BUN mg/dL 21 24 19 16 11 10 10   CREATININE mg/dL 1 02 1 22 1 07 1 03 0 96 1 02 1 02   EGFR ml/min/1 73sq m 58 46 54 57 62 58 58       Depressed mood  Assessment & Plan  Patient reports depressed mood since evicted from her apartment  - 3/3/23 PHQ-9 score 10 (moderate depression) without SH/SI   - In setting of living situation, recent eviction, and significant comorbidity cant rule out adjustment disorder/cyclothymia     Plan:  - I have discussed with patient different treatments for her depressed moods including starting SSRI  However, patient declines initiating any therapy at this time  Patient states she "has never thoughts of need to be treated for mental health" and reports her depression is manageable off medication at this time    I have discussed with patient that medication can be initiated at a later time if she desires  Cerebral aneurysm without rupture  Assessment & Plan  Patient denies any headache, dizziness, nausea, vomiting, or any focal neurological symptoms  - 3/7/22 CTA head: no acute intracranial disease, volume loss greater than expected for age  Two, 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  - 2/8/23 CT head: no acute intracranial abnormality   - Follows outpatient neurosurgery, last visit  3/16/22    Plan:  - Monitor clinically  - Low threshold to consult neurosurgery if patient develops severe headache or new neurological symptoms    Mild intermittent asthma without complication  Assessment & Plan  Continue home Symbicort for mild intermittent asthma  - Denies any chest tightness  - No active wheezing on exam    Prolonged QT interval  Assessment & Plan  QTc 507 on 2/28/23 EKG    Class 3 severe obesity with serious comorbidity and body mass index (BMI) of 50 0 to 59 9 in [facility])  Assessment & Plan  Secondary to dietary indiscretion  BMP 50 94  Plan:   CHO ctrl diet     Hyperlipidemia  Assessment & Plan  H/O HLD  - Patient reports has not been taking any of her medication for 1-2 weeks due to not poor diet and not feeling well  - Home regimen: Atorvastatin 40mg QD  - 5/7/21 lipid panel: Chol 164, , HDL 45, LDL 96    Assessment: ASCVD 23 5%    Plan:  - Continue home meds  - Will recheck fasting lipid panel    Lab Results   Component Value Date    CHOLESTEROL 157 03/01/2023    TRIG 117 03/01/2023    HDL 31 (L) 03/01/2023    LDLCALC 103 (H) 03/01/2023       Anemia  Assessment & Plan  Initial Hgb 11 3  - H/O chronic anemia not on home FeSO4 supplement  - 2/28/23 iron panel: Fe 40, FeSat 15%, TIBC 266, Ferritin 50    Assessment: Chronic anemia likely secondary to chronic disease vs CKD vs iron deficiency   Low suspicion for acute blood loss    Plan:  - Monitor Hgb  - Start Fe supplement  - Miralax PRN for iron-induced constipation  - Will check B12, folate    Results from last 7 days   Lab Units 03/08/23  0611 03/07/23  0441 03/06/23  0439 03/05/23  0444 03/04/23  0435 03/03/23  0430 03/02/23  0437   HEMOGLOBIN g/dL 10 4* 10 6* 11 1* 10 6* 10 5* 10 3* 9 7*   HEMATOCRIT % 34 8 35 5 37 8 35 7 35 2 34 4* 33 8*       Aortic dilatation (HCC)  Assessment & Plan  H/O Stable ascending thoracic aortic aneurysm  - Patient denies any chest pain on admission  - 2/18/22 CT chest: stable ectasia of the ascending thoracic aorta measuring 4 2 cm  - Follows cardiology outpatient, last visit 4/27/22    Plan:  - Monitor clinically  - Low threshold for repeat CT chest w/ contrast if patient develops acute onset chest pain        Patient Active Problem List   Diagnosis   • 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, with long-term current use of insulin   • Seizures ([facility])   • Exertional dyspnea   • Enlarged pulmonary artery ([facility])   • Aortic dilatation ([facility])   • Anemia   • Decreased pulses in feet   • Hyperlipidemia   • Microalbuminuria   • Obstructive sleep apnea (adult) (pediatric)   • Class 3 severe obesity with serious comorbidity and body mass index (BMI) of 50 0 to 59 9 in adult [facility])   • Neuropathy of hand, right   • Prolonged QT interval   • Visual impairment in both eyes   • Vitamin D deficiency   • Lung nodules   • Orthostatic hypotension   • Mild intermittent asthma without complication   • Type 2 diabetes mellitus with microalbuminuria, with long-term current use of insulin (HCC)   • Frequent headaches   • Other inflammatory and immune myopathies, not elsewhere classified   • Transaminitis   • Morbid obesity (HCC)   • Polyneuropathy associated with underlying disease ([facility] [address] )   • PMR (polymyalgia rheumatica) ([facility])   • Thrombocytosis   • Left cavernous carotid aneurysm   • Type 2 diabetes mellitus with hyperglycemia, with long-term current use of insulin (HCC)   • Aneurysm ([facility])   • Thyroid nodule   • History of absence seizures   • Hypersomnia • Migraine without aura and without status migrainosus, not intractable   • Cerebral aneurysm without rupture   • Chronic migraine without aura without status migrainosus, not intractable   • Type 2 diabetes mellitus with other specified complication ([facility] [address] )   • Essential hypertension   • Depressed mood   • Blurry vision, bilateral   • Ulnar neuropathy of right upper extremity   • Polymyalgia rheumatica (HCC)   • Gait instability   • Stage 3a chronic kidney disease (HCC)   • Chronic migraine without aura, not intractable, without status migrainosus   • Obstructive sleep apnea   • Diabetic neuropathy (HCC)   • Unsteadiness on feet   • Sepsis (HCC)   • Abnormal urinalysis   • Weakness   • Intertrigo   • Leg numbness   • Homelessness   • Bilateral lower extremity edema   • Cough   • Diarrhea         HPI (per admission H&P note on 3/1/2023)     HPI:   Per Dr Blank Horton on 3/1/23:     57 y o  female admitted for hyperglycemia and generalized weakness  Complicated pmhx of homelessness, insulin-dependent T2DM with retinopathy causing legal blindness bilaterally, polymyalgia rheumatica on maintenance prednisone, absence seizure, cerebral artery aneurysm, migraine w/o aura, MOHAN, asthma, thoracic aortic aneurysm, HTN, HLD, CKD-3 with microalbuminuria, and morbid obesity  Patient brought to [facility] ED by EMS on 2/28/2023 for c/o fatigue and decreased appetite associated with poor diet, poor sleep, and inability to take care of herself  Per chart check, patient has been evaluated by ED 6 times with 1 admission over the last 2-month for similar concerns  Patient reports progressively worsening generalized weakness started several month ago and was recently evicted from her apartment early Feb 2023 after sustaining a fall at home  Patient states she has been homeless since and has been living in warm shelter which was unable to accommodate her medical conditions   Patient states she has difficulty ambulating, currently wheelchair dependent, and also has difficulty with standing up and sitting down from the toilet  Patient also reports chronic bilateral lower extremity edema started several years ago but noted acute nonproductive cough  a/w exertional dyspnea started several weeks ago  Patient reports has not been taking any of her medication for 1-2 weeks due to not poor diet and not feeling well  Otherwise patient denies any current headache, dizziness, nausea/vomiting, chest pain, shortness of breath, abdominal pain, diarrhea, constipation, dysuria, or focal weakness  HOSPITAL COURSE:     Hospital Course:   57 y o  female admitted on 2/28/2023 for generalized weakness and malaise in the context of uncontrolled diabetes and polymyalgia rheumatica  At the time of admission, pt had been off all of her home medications including her insulin and daily steroids for the polymyalgia  On admission to the hospital, she was restarted on her daily prednsione 4mg, and Metformin as well as insulin that was adjusted throughout her hospitalization until her blood sugars were controlled on 30u lantus qhs  On day of admission, a rapid response was called to patient room as she slipped trying to get out of bed and landed on her buttock  She was unable to get up with the help of 3 patient care assistants, and a Hover Willy was used to get her back into bed safely  There was not a headstrike or any other injury sustained during the fall  She was evaluated by PT and OT who recommended discharge to short term rehab, and continued towork with patient to ambulate throughout her hospital stay  She did complain of diarrhea during her stay, for which a C diff test resulted negative on 3/6  Her Metformin and iron supplementation were discontinued and pt was started on imodium, which improved her diarrhea  Her vitals and labwork remained stable, and she was medically cleared for discharge pending placement in short term rehab      On 03/08/23, HD# 7, pt remains stable and is medically cleared for discharge to short term rehab at [address] [place] [address]  COMPLICATIONS:     Complications: NONE       PROCEDURES:     Procedures Performed:   No orders of the defined types were placed in this encounter  SIGNIFICANT FINDINGS / ABNORMAL RESULTS:     Significant Findings/Abnormal Results with this admission:    XR chest portable    Result Date: 3/2/2023  Impression: Improvement in pulmonary vascular congestion with minimal edema  Stable cardiomegaly  Workstation performed: Ruchi Drummond ON DISCHARGE DATE:     Vitals  Blood Pressure: 142/75 (03/08/23 0904)  Temperature: 98 8 °F (37 1 °C) (03/08/23 0904)  Temp Source: Oral (03/03/23 2127)  Pulse: 89 (03/07/23 0946)  Respirations: 18 (03/03/23 1520)  SpO2: 94 % (03/07/23 0946)  Height: 5' 8" (172 7 cm) (03/01/23 0903)  Weight - Scale: (!) 152 kg (335 lb) (03/01/23 0903)    Temp:  [98 8 °F (37 1 °C)-99 5 °F (37 5 °C)] 98 8 °F (37 1 °C)  BP: (142-145)/(73-75) 142/75    Weight (last 2 days)     None            Intake/Output Summary (Last 24 hours) at 3/8/2023 1220  Last data filed at 3/8/2023 3670  Gross per 24 hour   Intake 540 ml   Output --   Net 540 ml       Invasive Devices     None                   PHYSICAL EXAM ON DAY OF DISCHARGE:       Physical Exam:     General: Pt observed lying comfortably in bed, NAD  Not toxic/ill-appearing  No cachectic or diaphoresis  No obvious sign of trauma or bleeding  Psych: AAOx4, able to converse appropriately  Denies SH/SI  Neuro: no gross neurological deficits, CN 2-12 grossly intact  Head: atraumatic, normocephalic  Eyes: open spontaneously, EOM intact, KRYSTAL, conjunctiva non-injected, no scleral icterus, no discharge  Ear: normal external ear, no visible drainage at external auditory orifice  Nose: clear, no epistaxis, no rhinorrhea  Throat: clear, no hoarse voice, no cough  Neck: supple, normal ROM  Heart: RRR, no murmur/distant heart sound appreciated    Lungs: LCTABL, nml respiratory effort, no agonal/labored breathing, no accessory muscle use  Abdomen: soft, nontender, nondistended, normal bowel sound  Extremities: +4 strengths b/l  Strong radial/pedal pulses  No weakness/paresthesia/edema  CONDITION AT DISCHARGE:   On day of discharge patient is seen and evaluated at bedside  Patient is stable and without concern  Patient denies any pain or SOB  Patient able to tolerate PO food without N/V/D and had bowel movement and baseline urine output  Patient able to ambulate independently without assistance  Patient is aware of current health status and understand plan of treatment and outpatient follow-up  The patient understood and agreed with the plan  All pertinent lab results, imaging studies, procedures, and/or any incidental findings have been disclosed to the patient  All pertinent questions are answered to patient's satisfaction  On day of discharge, the patient was hemodynamically stable and appropriate for discharge to short term rehabilitation  Condition at Discharge: stable       DISCHARGE MEDICATIONS:     Discharge Medications:  See after visit summary (AVS) for detailed reconciled discharge medications, which was provided to patient and family  Summary of medication changes made with this admission:    · START:  1  Levemir 30u qhs  2  Imodium prn diarrhea    · STOP:  1  Metformin  2  Humalog      · RESUME:  1  All other home medications       FOLLOW-UP APPOINTMENTS / INSTRUCTION :     Important Physician Related Follow Up:     Appointment confirmed:  Future Appointments   Date Time Provider Estefany Tristan   3/23/2023 10:30 AM JOSE LOZAO ABRAHAM 1 [address] [facility] [address]   3/23/2023 11:00 AM César Aguilera 58  1 [address] [facility] [address]       Discharge instructions/Information to patient and family:   See after visit summary (AVS) for information provided to patient and family        Provisions for Follow-Up Care:  See after visit summary for information related to follow-up care and any pertinent home health orders  DISPOSITION:     Disposition: Short-term rehab at [facility]    Discharge Statement   I spent 30  minutes discharging the patient  This time was spent on the day of discharge  I had direct contact with the patient on the day of discharge  Additional documentation is required if more than 30 minutes were spent on discharge  Planned Readmission: Nichole Oglesby DO  PGY-2, Family Medicine  03/08/23  12:20 PM    Dear reader, please be aware that portions of my note contain dictated text  I have done my best to proof-read this note prior to signing  However, there may be occasional unnoticed errors pertaining to "sound-alike" words and/or grammar during my dictation process  If there is any words or information that is unclear or appears erroneous, please kindly let me know and I will clarify and/or addend my notes accordingly  Thank you for your understanding

## 2023-03-08 NOTE — CASE MANAGEMENT
[address] 48 has received approved authorization from insurance:   [place] by Rep: [facility] P#  [phone]  Authorization received for: SNF  [facility] #: [id number] (Plan auth ID not yet generated)  Start of Care: 3/8  Next Review Date: 3/10  Care Coordinator: George Ward  P#: [phone]  Submit next review to: [phone]   Care Manager notified: Ana Gonzalez

## 2023-03-08 NOTE — CASE MANAGEMENT
Case Management Discharge Planning Note    Patient name Derek Slight  Location /-67 MRN [id number]  : 1960 Date 3/8/2023       Current Admission Date: 2023  Current Admission Diagnosis:Weakness   Patient Active Problem List    Diagnosis Date Noted   • Diarrhea 2023   • Bilateral lower extremity edema 2023   • Cough 2023   • Homelessness 2023   • Sepsis ([place] [address] ) 2023   • Abnormal urinalysis 2023   • Weakness 2023   • Intertrigo 2023   • Leg numbness 2023   • Unsteadiness on feet 2022   • Chronic migraine without aura, not intractable, without status migrainosus 2022   • Obstructive sleep apnea 2022   • Diabetic neuropathy ([place] [address] ) 2022   • Stage 3a chronic kidney disease ([place] [address] ) 2022   • Polymyalgia rheumatica ([place] [address] ) 2021   • Gait instability 2021   • Ulnar neuropathy of right upper extremity    • Blurry vision, bilateral 2021   • Depressed mood 10/08/2020   • Essential hypertension 09/15/2020   • Type 2 diabetes mellitus with other specified complication ([place] [address] ) 1536   • Chronic migraine without aura without status migrainosus, not intractable 2020   • Hypersomnia 2020   • Migraine without aura and without status migrainosus, not intractable 2020   • Cerebral aneurysm without rupture 2020   • History of absence seizures 2020   • Thyroid nodule 2020   • Aneurysm ([place] [address] ) 2020   • Type 2 diabetes mellitus with hyperglycemia, with long-term current use of insulin ([place] [address] ) 2020   • Left cavernous carotid aneurysm 02/10/2020   • Polyneuropathy associated with underlying disease ([place] [address] ) 2020   • PMR (polymyalgia rheumatica) ([place] [address] ) 2020   • Thrombocytosis 2020   • Morbid obesity ([place] [address] ) 2019   • Other inflammatory and immune myopathies, not elsewhere classified 2019   • Transaminitis 2019   • Frequent headaches 2019   • Type 2 diabetes mellitus with microalbuminuria, with long-term current use of insulin ([facility] [address] ) 02/01/2019   • Mild intermittent asthma without complication 42/40/0864   • Orthostatic hypotension 06/25/2018   • Lung nodules 03/22/2018   • Exertional dyspnea 02/13/2018   • Enlarged pulmonary artery (HCC) 02/13/2018   • Aortic dilatation (HCC) 02/13/2018   • 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, with long-term current use of insulin    • Seizures ([facility] [address] )    • Obstructive sleep apnea (adult) (pediatric) 12/18/2017   • Prolonged QT interval 12/18/2017   • Decreased pulses in feet 12/11/2017   • Microalbuminuria 09/08/2015   • Vitamin D deficiency 06/06/2014   • Visual impairment in both eyes 12/06/2012   • Anemia 03/20/2012   • Hyperlipidemia 03/20/2012   • Class 3 severe obesity with serious comorbidity and body mass index (BMI) of 50 0 to 59 9 in adult [facility]) 03/20/2012   • Neuropathy of hand, right 03/20/2012      LOS (days): 7  Geometric Mean LOS (GMLOS) (days): 2 90  Days to GMLOS:-3 9     OBJECTIVE:  Risk of Unplanned Readmission Score: 24 08         Current admission status: Inpatient   Preferred Pharmacy:   [address] [facility], [facility] [place] [address]  Phone: [phone] Fax: [phone]    [facility] ([address] [place] [address],   [address] [place] [address]  [address] [place] [address]  Phone: [phone] Fax: [phone]    Primary Care Provider: Laney Gomez MD    Primary Insurance: [facility] [place] DUAL COMPLETE [address] [facility] RE[address]  Secondary Insurance:     DISCHARGE DETAILS:    Discharge planning discussed with[de-identified] Patient and son  Freedom of Choice: Yes  Comments - Freedom of Choice: Rosario Paiz CM contacted family/caregiver?: Yes  Were Treatment Team discharge recommendations reviewed with patient/caregiver?: Yes  Did patient/caregiver verbalize understanding of patient care needs?: N/A- going to facility  Were patient/caregiver advised of the risks associated with not following Treatment Team discharge recommendations?: Yes    Contacts  Patient Contacts: Margaret Aleman at bedside  Contact Method: In Person  Reason/Outcome: Continuity of Care, Emergency Contact, Discharge [address]         Is the patient interested in [place] [address] at discharge?: No    DME Referral Provided  Referral made for DME?: No    Other Referral/Resources/Interventions Provided:  Interventions: Short Term Rehab            Discharge Destination Plan[de-identified] Short Term Rehab  Transport at Discharge : Wheelchair van  Dispatcher Contacted: Yes  Number/Name of Dispatcher: Round Trip  Transported by [employer] and Unit #): [place]  ETA of Transport (Date): 03/08/23  ETA of Transport (Time): 1230     Transfer Mode: Wheelchair        IMM Given (Date):: 03/08/23  IMM Given to[de-identified] Patient (PT complained that due to her vision she could not read the IMM notice  CM read the notice to her and suggested she have Waldemar July sign on her behalf  Waldemar July refused  PT c/o that she does not have her glasses )  Family notified[de-identified] Waldemar July at bedside  Additional Comments: PT did sign her IMM notice  Accepting Facility Name, [facility] : [facility]averas  Receiving Facility/Agency Phone Number: [phone]  Facility/Agency Fax Number: [phone]       IMM reviewed with patient and caregiver, patient and caregiver agrees with discharge determination

## 2023-03-08 NOTE — PLAN OF CARE
Problem: PAIN - ADULT  Goal: Verbalizes/displays adequate comfort level or baseline comfort level  Description: Interventions:  - Encourage patient to monitor pain and request assistance  - Assess pain using appropriate pain scale  - Administer analgesics based on type and severity of pain and evaluate response  - Implement non-pharmacological measures as appropriate and evaluate response  - Consider cultural and social influences on pain and pain management  - Notify physician/advanced practitioner if interventions unsuccessful or patient reports new pain  Outcome: Adequate for Discharge     Problem: INFECTION - ADULT  Goal: Absence or prevention of progression during hospitalization  Description: INTERVENTIONS:  - Assess and monitor for signs and symptoms of infection  - Monitor lab/diagnostic results  - Monitor all insertion sites, i e  indwelling lines, tubes, and drains  - Monitor endotracheal if appropriate and nasal secretions for changes in amount and color  - [place] appropriate cooling/warming therapies per order  - Administer medications as ordered  - Instruct and encourage patient and family to use good hand hygiene technique  - Identify and instruct in appropriate isolation precautions for identified infection/condition  Outcome: Adequate for Discharge  Goal: Absence of fever/infection during neutropenic period  Description: INTERVENTIONS:  - Monitor WBC    Outcome: Adequate for Discharge     Problem: SAFETY ADULT  Goal: Patient will remain free of falls  Description: INTERVENTIONS:  - Educate patient/family on patient safety including physical limitations  - Instruct patient to call for assistance with activity   - Consult OT/PT to assist with strengthening/mobility   - Keep Call bell within reach  - Keep bed low and locked with side rails adjusted as appropriate  - Keep care items and personal belongings within reach  - Initiate and maintain comfort rounds  - Make Fall Risk Sign visible to staff  - Apply yellow socks and bracelet for high fall risk patients  - Consider moving patient to room near nurses station  Outcome: Adequate for Discharge  Goal: Maintain or return to baseline ADL function  Description: INTERVENTIONS:  -  Assess patient's ability to carry out ADLs; assess patient's baseline for ADL function and identify physical deficits which impact ability to perform ADLs (bathing, care of mouth/teeth, toileting, grooming, dressing, etc )  - Assess/evaluate cause of self-care deficits   - Assess range of motion  - Assess patient's mobility; develop plan if impaired  - Assess patient's need for assistive devices and provide as appropriate  - Encourage maximum independence but intervene and supervise when necessary  - Involve family in performance of ADLs  - Assess for home care needs following discharge   - Consider OT consult to assist with ADL evaluation and planning for discharge  - Provide patient education as appropriate  Outcome: Adequate for Discharge  Goal: Maintains/Returns to pre admission functional level  Description: INTERVENTIONS:  - Perform BMAT or MOVE assessment daily    - Set and communicate daily mobility goal to care team and patient/family/caregiver     - Collaborate with rehabilitation services on mobility goals if consulted  - Out of bed for toileting  - Record patient progress and toleration of activity level   Outcome: Adequate for Discharge     Problem: DISCHARGE PLANNING  Goal: Discharge to home or other facility with appropriate resources  Description: INTERVENTIONS:  - Identify barriers to discharge w/patient and caregiver  - Arrange for needed discharge resources and transportation as appropriate  - Identify discharge learning needs (meds, wound care, etc )  - Arrange for interpretive services to assist at discharge as needed  - Refer to Case Management Department for coordinating discharge planning if the patient needs post-hospital services based on physician/advanced practitioner order or complex needs related to functional status, cognitive ability, or social support system  Outcome: Adequate for Discharge     Problem: Knowledge Deficit  Goal: Patient/family/caregiver demonstrates understanding of disease process, treatment plan, medications, and discharge instructions  Description: Complete learning assessment and assess knowledge base    Interventions:  - Provide teaching at level of understanding  - Provide teaching via preferred learning methods  Outcome: Adequate for Discharge     Problem: Prexisting or High Potential for Compromised Skin Integrity  Goal: Skin integrity is maintained or improved  Description: INTERVENTIONS:  - Identify patients at risk for skin breakdown  - Assess and monitor skin integrity  - Assess and monitor nutrition and hydration status  - Monitor labs   - Assess for incontinence   - Turn and reposition patient  - Assist with mobility/ambulation  - Relieve pressure over bony prominences  - Avoid friction and shearing  - Provide appropriate hygiene as needed including keeping skin clean and dry  - Evaluate need for skin moisturizer/barrier cream  - Collaborate with interdisciplinary team   - Patient/family teaching  - Consider wound care consult   Outcome: Adequate for Discharge     Problem: MOBILITY - ADULT  Goal: Maintain or return to baseline ADL function  Description: INTERVENTIONS:  -  Assess patient's ability to carry out ADLs; assess patient's baseline for ADL function and identify physical deficits which impact ability to perform ADLs (bathing, care of mouth/teeth, toileting, grooming, dressing, etc )  - Assess/evaluate cause of self-care deficits   - Assess range of motion  - Assess patient's mobility; develop plan if impaired  - Assess patient's need for assistive devices and provide as appropriate  - Encourage maximum independence but intervene and supervise when necessary  - Involve family in performance of ADLs  - Assess for home care needs following discharge   - Consider OT consult to assist with ADL evaluation and planning for discharge  - Provide patient education as appropriate  Outcome: Adequate for Discharge  Goal: Maintains/Returns to pre admission functional level  Description: INTERVENTIONS:  - Perform BMAT or MOVE assessment daily    - Set and communicate daily mobility goal to care team and patient/family/caregiver  - Collaborate with rehabilitation services on mobility goals if consulted  - Out of bed for toileting  - Record patient progress and toleration of activity level   Outcome: Adequate for Discharge     Problem: Nutrition/Hydration-ADULT  Goal: Nutrient/Hydration intake appropriate for improving, restoring or maintaining nutritional needs  Description: Monitor and assess patient's nutrition/hydration status for malnutrition  Collaborate with interdisciplinary team and initiate plan and interventions as ordered  Monitor patient's weight and dietary intake as ordered or per policy  Utilize nutrition screening tool and intervene as necessary  Determine patient's food preferences and provide high-protein, high-caloric foods as appropriate       INTERVENTIONS:  - Monitor oral intake, urinary output, labs, and treatment plans  - Assess nutrition and hydration status and recommend course of action  - Evaluate amount of meals eaten  - Assist patient with eating if necessary   - Allow adequate time for meals  - Recommend/ encourage appropriate diets, oral nutritional supplements, and vitamin/mineral supplements  - Order, calculate, and assess calorie counts as needed  - Recommend, monitor, and adjust tube feedings and TPN/PPN based on assessed needs  - Assess need for intravenous fluids  - Provide specific nutrition/hydration education as appropriate  - Include patient/family/caregiver in decisions related to nutrition  Outcome: Adequate for Discharge

## 2023-03-08 NOTE — PLAN OF CARE
Problem: INFECTION - ADULT  Goal: Absence or prevention of progression during hospitalization  Description: INTERVENTIONS:  - Assess and monitor for signs and symptoms of infection  - Monitor lab/diagnostic results  - Monitor all insertion sites, i e  indwelling lines, tubes, and drains  - Monitor endotracheal if appropriate and nasal secretions for changes in amount and color  - [place] appropriate cooling/warming therapies per order  - Administer medications as ordered  - Instruct and encourage patient and family to use good hand hygiene technique  - Identify and instruct in appropriate isolation precautions for identified infection/condition  Outcome: Progressing  Goal: Absence of fever/infection during neutropenic period  Description: INTERVENTIONS:  - Monitor WBC    Outcome: Progressing     Problem: DISCHARGE PLANNING  Goal: Discharge to home or other facility with appropriate resources  Description: INTERVENTIONS:  - Identify barriers to discharge w/patient and caregiver  - Arrange for needed discharge resources and transportation as appropriate  - Identify discharge learning needs (meds, wound care, etc )  - Arrange for interpretive services to assist at discharge as needed  - Refer to Case Management Department for coordinating discharge planning if the patient needs post-hospital services based on physician/advanced practitioner order or complex needs related to functional status, cognitive ability, or social support system  Outcome: Progressing

## 2023-03-08 NOTE — CASE MANAGEMENT
Case Management Discharge Planning Note    Patient name Lorin Frances  Location /-38 MRN [id number]  : 1960 Date 3/8/2023       Current Admission Date: 2023  Current Admission Diagnosis:Weakness   Patient Active Problem List    Diagnosis Date Noted   • Diarrhea 2023   • Bilateral lower extremity edema 2023   • Cough 2023   • Homelessness 2023   • Sepsis ([place] [address] ) 2023   • Abnormal urinalysis 2023   • Weakness 2023   • Intertrigo 2023   • Leg numbness 2023   • Unsteadiness on feet 2022   • Chronic migraine without aura, not intractable, without status migrainosus 2022   • Obstructive sleep apnea 2022   • Diabetic neuropathy ([place] [address] ) 2022   • Stage 3a chronic kidney disease ([place] [address] ) 2022   • Polymyalgia rheumatica ([place] [address] ) 2021   • Gait instability 2021   • Ulnar neuropathy of right upper extremity    • Blurry vision, bilateral 2021   • Depressed mood 10/08/2020   • Essential hypertension 09/15/2020   • Type 2 diabetes mellitus with other specified complication ([place] [address] )    • Chronic migraine without aura without status migrainosus, not intractable 2020   • Hypersomnia 2020   • Migraine without aura and without status migrainosus, not intractable 2020   • Cerebral aneurysm without rupture 2020   • History of absence seizures 2020   • Thyroid nodule 2020   • Aneurysm ([place] [address] ) 2020   • Type 2 diabetes mellitus with hyperglycemia, with long-term current use of insulin ([place] [address] ) 2020   • Left cavernous carotid aneurysm 02/10/2020   • Polyneuropathy associated with underlying disease ([place] [address] ) 2020   • PMR (polymyalgia rheumatica) ([place] [address] ) 2020   • Thrombocytosis 2020   • Morbid obesity ([place] [address] ) 2019   • Other inflammatory and immune myopathies, not elsewhere classified 2019   • Transaminitis 2019   • Frequent headaches 2019   • Type 2 diabetes mellitus with microalbuminuria, with long-term current use of insulin ([facility] [address] ) 02/01/2019   • Mild intermittent asthma without complication 42/70/6370   • Orthostatic hypotension 06/25/2018   • Lung nodules 03/22/2018   • Exertional dyspnea 02/13/2018   • Enlarged pulmonary artery (HCC) 02/13/2018   • Aortic dilatation (HCC) 02/13/2018   • 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, with long-term current use of insulin    • Seizures ([facility] [address] )    • Obstructive sleep apnea (adult) (pediatric) 12/18/2017   • Prolonged QT interval 12/18/2017   • Decreased pulses in feet 12/11/2017   • Microalbuminuria 09/08/2015   • Vitamin D deficiency 06/06/2014   • Visual impairment in both eyes 12/06/2012   • Anemia 03/20/2012   • Hyperlipidemia 03/20/2012   • Class 3 severe obesity with serious comorbidity and body mass index (BMI) of 50 0 to 59 9 in adult [facility]) 03/20/2012   • Neuropathy of hand, right 03/20/2012      LOS (days): 7  Geometric Mean LOS (GMLOS) (days): 2 90  Days to GMLOS:-3 8     OBJECTIVE:  Risk of Unplanned Readmission Score: 24 08         Current admission status: Inpatient   Preferred Pharmacy:   [address] [place] [address]  Phone: [phone] Fax: [phone]    [facility] ([address] [place] [address],   [address] [place] [address]  Phone: [phone] Fax: [phone]    Primary Care Provider: Ruiz Bose MD    Primary Insurance: [address] [place] [address] [facility]: [address] Number: [id number]

## 2023-03-09 ENCOUNTER — NURSING HOME VISIT (OUTPATIENT)
Dept: GERIATRICS | Facility: OTHER | Age: 63
End: 2023-03-09

## 2023-03-09 DIAGNOSIS — R60.0 BILATERAL LOWER EXTREMITY EDEMA: ICD-10-CM

## 2023-03-09 DIAGNOSIS — Z79.4 TYPE 2 DIABETES MELLITUS WITH MICROALBUMINURIA, WITH LONG-TERM CURRENT USE OF INSULIN (HCC): Primary | ICD-10-CM

## 2023-03-09 DIAGNOSIS — R26.2 AMBULATORY DYSFUNCTION: ICD-10-CM

## 2023-03-09 DIAGNOSIS — R80.9 TYPE 2 DIABETES MELLITUS WITH MICROALBUMINURIA, WITH LONG-TERM CURRENT USE OF INSULIN (HCC): Primary | ICD-10-CM

## 2023-03-09 DIAGNOSIS — N18.31 STAGE 3A CHRONIC KIDNEY DISEASE (HCC): ICD-10-CM

## 2023-03-09 DIAGNOSIS — J45.20 MILD INTERMITTENT ASTHMA WITHOUT COMPLICATION: ICD-10-CM

## 2023-03-09 DIAGNOSIS — D50.8 IRON DEFICIENCY ANEMIA SECONDARY TO INADEQUATE DIETARY IRON INTAKE: ICD-10-CM

## 2023-03-09 DIAGNOSIS — H54.8 LEGALLY BLIND: ICD-10-CM

## 2023-03-09 DIAGNOSIS — G47.33 OBSTRUCTIVE SLEEP APNEA: ICD-10-CM

## 2023-03-09 DIAGNOSIS — I10 PRIMARY HYPERTENSION: ICD-10-CM

## 2023-03-09 DIAGNOSIS — M35.3 POLYMYALGIA RHEUMATICA (HCC): ICD-10-CM

## 2023-03-09 DIAGNOSIS — I67.1 CEREBRAL ANEURYSM WITHOUT RUPTURE: ICD-10-CM

## 2023-03-09 DIAGNOSIS — E11.29 TYPE 2 DIABETES MELLITUS WITH MICROALBUMINURIA, WITH LONG-TERM CURRENT USE OF INSULIN (HCC): Primary | ICD-10-CM

## 2023-03-09 PROBLEM — E78.5 DYSLIPIDEMIA: Status: ACTIVE | Noted: 2023-03-09

## 2023-03-09 PROBLEM — E11.65 TYPE 2 DIABETES MELLITUS WITH HYPERGLYCEMIA (HCC): Status: ACTIVE | Noted: 2023-03-09

## 2023-03-09 RX ORDER — MELATONIN
2000 DAILY
COMMUNITY

## 2023-03-09 RX ORDER — INSULIN LISPRO 100 [IU]/ML
INJECTION, SOLUTION INTRAVENOUS; SUBCUTANEOUS
COMMUNITY

## 2023-03-09 RX ORDER — LANOLIN ALCOHOL/MO/W.PET/CERES
1 CREAM (GRAM) TOPICAL 2 TIMES DAILY
COMMUNITY

## 2023-03-09 RX ORDER — ACETAMINOPHEN 325 MG/1
650 TABLET ORAL EVERY 6 HOURS PRN
COMMUNITY

## 2023-03-09 NOTE — ASSESSMENT & PLAN NOTE
Lab Results   Component Value Date    HGBA1C 10 3 (H) 02/28/2023     Was not taking meds due to her social condition  Has been restarted back on medications  Will increase lantus to 34 units as blood sugars elevated and has been on higher meds before as per records     Continue with sliding  Continue with monitoring blood sugars  Continue with diabetic diet   Also legally blind

## 2023-03-09 NOTE — ASSESSMENT & PLAN NOTE
CTA in the hospital no significant change, aneurysm stable at 3-4mm  Continue follow up with neurosurgery as out patient  Continue monitoring for any new neurological symptoms

## 2023-03-09 NOTE — PROGRESS NOTES
[facility] notes  SHORT TERM REHAB       NAME: Gonzalo Cotton  AGE: 57 y o  SEX: female    DATE OF ENCOUNTER: 3/9/2023    Assessment and Plan   Type 2 diabetes mellitus with microalbuminuria, with long-term current use of insulin ([facility])    Lab Results   Component Value Date    HGBA1C 10 3 (H) 02/28/2023     Was not taking meds due to her social condition  Has been restarted back on medications  Will increase lantus to 34 units as blood sugars elevated and has been on higher meds before as per records     Continue with sliding  Continue with monitoring blood sugars  Continue with diabetic diet   Also legally blind     Ambulatory dysfunction  Has been declining with her ambulation  Multifactorial also from her PMR and diabetes  Not been on her meds for PMR  Restarted back on prednisone and reports better  Continue with safety measures   PT/OT eval and treat      Legally blind  Continue with supportive care  Continue with safety measures and fall precautions     Hypertension  BP in good range since admission  Continue current meds  Continue monitoring BP  Low sodium diet recommended     Anemia  Hb was 10 4 on discharge from the hospital   Will repeat CBC  No signs of bleeding     Cerebral aneurysm without rupture  CTA in the hospital no significant change, aneurysm stable at 3-4mm  Continue follow up with neurosurgery as out patient  Continue monitoring for any new neurological symptoms     Bilateral lower extremity edema  Seems stable  Continue monitoring for now  Continue with elevation of the legs    Stage 3a chronic kidney disease ([address] )  Lab Results   Component Value Date    EGFR 58 03/08/2023    EGFR 46 03/07/2023    EGFR 54 03/06/2023    CREATININE 1 02 03/08/2023    CREATININE 1 22 03/07/2023    CREATININE 1 07 03/06/2023       Continue encouraging fluids  Will repeat BMP       Mild intermittent asthma without complication  Hx of asthma  Continue current meds  Continue monitoring symptoms Obstructive sleep apnea  Hx of MOHAN  Not been on CPAP due to be homeless    Polymyalgia rheumatica ([facility] [address] )  Restarted back on meds  Continue prednisone  Reports symptoms are improving           Chief Complaint     No new complaints    History of Present Illness     56 yo female seen for admission to [facility] after hospitalization  As per patient she was evicted from her place some time time in February and has not been taking her medications  She has been going from shelter to shelter and was not able to take her medications  She had multiple ER visit and was then admitted due to her decline  She was restarted back on her medications and had her medications adjusted for chronic medical conditions  She was then discharged to [facility] for continued care and therapy  Also to find a solution for her living condition  Reviewed hospital labs and imaging reports from the hospital records  PMHx     Past Medical History:   Diagnosis Date   • Anemia    • Arthritis    • Benign hypertension     Procedure/Onset: 01/01/2005; Description: BP elevated today  Pt reports running out of BP meds 2wks ago  Will resume BP meds     • Diabetes mellitus ([facility] )    • Diabetes mellitus type 2 with complications, uncontrolled 9/8/2015   • Dyspnea on exertion    • Hyperlipidemia    • Hypertension    • Legally blind 2010   • Mild intermittent asthma with exacerbation 8/4/2018   • Miscarriage     x 3   • Polymyalgia rheumatica ([facility] [address] ) 11/24/2021   • Seizures ([facility] [address] )    • Sickle cell trait ([facility] [address] )    • Sleep apnea    • Stage 3a chronic kidney disease ([facility] [address] ) 5/19/2022   • Thyroid nodule 3/6/2020     Past Surgical History:   Procedure Laterality Date   • CATARACT EXTRACTION Bilateral     august and september   • EYE SURGERY     • HYSTERECTOMY      37   • OOPHORECTOMY Left     37   • WA LIGATION/BIOPSY TEMPORAL ARTERY Bilateral 10/17/2019    Procedure: BIOPSY ARTERY TEMPORAL;  Surgeon: Sherly Barksdale DO;  Location: [facility]; Service: Vascular   • US GUIDED THYROID BIOPSY  5/13/2020     Family History   Problem Relation Age of Onset   • Heart attack Mother    • Heart disease Mother    • Hypertension Mother    • No Known Problems Father    • Heart disease Sister    • No Known Problems Brother    • No Known Problems Son    • No Known Problems Daughter    • Heart attack Maternal Grandmother    • Heart disease Maternal Grandmother    • Diabetes Other    • Heart attack Other    • No Known Problems Sister    • No Known Problems Sister    • No Known Problems Paternal Aunt    • No Known Problems Son    • Cancer Neg Hx    • Stroke Neg Hx      Social History     Socioeconomic History   • Marital status: /Civil Union     Spouse name: None   • Number of children: None   • Years of education: None   • Highest education level: None   Occupational History   • Occupation: long term disability   Tobacco Use   • Smoking status: Never   • Smokeless tobacco: Never   Vaping Use   • Vaping Use: Never used   Substance and Sexual Activity   • Alcohol use: Never     Alcohol/week: 0 0 standard drinks   • Drug use: No   • Sexual activity: Not Currently     Partners: Male     Birth control/protection: None   Other Topics Concern   • None   Social History Narrative    Caffeine use    Resides with spouse and one son     Social Determinants of Health     Financial Resource Strain: Not on file   Food Insecurity: Food Insecurity Present   • Worried About Running Out of Food in the Last Year: Sometimes true   • Ran Out of Food in the Last Year: Sometimes true   Transportation Needs: No Transportation Needs   • Lack of Transportation (Medical): No   • Lack of Transportation (Non-Medical):  No   Physical Activity: Not on file   Stress: Not on file   Social Connections: Not on file   Intimate Partner Violence: Not on file   Housing Stability: High Risk   • Unable to Pay for Housing in the Last Year: Yes   • Number of Places Lived in the Last Year: Not on file   • Unstable Housing in the Last Year: Yes     No Known Allergies    Review of Systems     Denies any pain or shortness of breath  Reports legally blind   Generalized weakness   All other review of system negative        Objective   Vital signs:  BP: 130/67  HR: 73  RR: 18  TEMP: 97 4F  SAT : 97% on RA    PHYSICAL EXAM:  GENERAL: no acute distress  SKIN: warm, dry, no rash, no cyanosis, chronic skin changes of the legs  HEENT: normocephalic, atraumatic, no JVD, no Thyromegaly, no lymphadenopathy  LUNGS: CTA, no wheezing, no rales, expanded equally, no chest tenderness   HEART: normal rhythm, normal rate, no murmur, no gallop  ABDOMEN: soft non tender non distended bs+, no guarding or rebound tenderness  :  no suprapubic tenderness  MUSCULOSKELETAL: strength about 4+/5 all extremities, ROM within normal, bilateral lower leg edema pitting +1, no calf tenderness  NEUROLOGY: awake, alert, Ox3, able to recall 2/3 object  EOMI   PSYCH: cooperative, pleasant  Pertinent Laboratory/Diagnostic Studies:  Recent labs and diagnostic tests reviewed in nursing home EMR    Current Medications   Medications reviewed and signed off on nursing home EMR

## 2023-03-09 NOTE — ASSESSMENT & PLAN NOTE
BP in good range since admission  Continue current meds  Continue monitoring BP  Low sodium diet recommended

## 2023-03-09 NOTE — ASSESSMENT & PLAN NOTE
Has been declining with her ambulation  Multifactorial also from her PMR and diabetes  Not been on her meds for PMR  Restarted back on prednisone and reports better  Continue with safety measures   PT/OT eval and treat

## 2023-03-09 NOTE — ASSESSMENT & PLAN NOTE
Lab Results   Component Value Date    EGFR 58 03/08/2023    EGFR 46 03/07/2023    EGFR 54 03/06/2023    CREATININE 1 02 03/08/2023    CREATININE 1 22 03/07/2023    CREATININE 1 07 03/06/2023       Continue encouraging fluids  Will repeat BMP

## 2023-03-13 ENCOUNTER — NURSING HOME VISIT (OUTPATIENT)
Dept: GERIATRICS | Facility: OTHER | Age: 63
End: 2023-03-13

## 2023-03-13 DIAGNOSIS — G63 POLYNEUROPATHY ASSOCIATED WITH UNDERLYING DISEASE (HCC): ICD-10-CM

## 2023-03-13 DIAGNOSIS — J45.20 MILD INTERMITTENT ASTHMA WITHOUT COMPLICATION: ICD-10-CM

## 2023-03-13 DIAGNOSIS — E11.29 TYPE 2 DIABETES MELLITUS WITH MICROALBUMINURIA, WITH LONG-TERM CURRENT USE OF INSULIN (HCC): Primary | ICD-10-CM

## 2023-03-13 DIAGNOSIS — R80.9 TYPE 2 DIABETES MELLITUS WITH MICROALBUMINURIA, WITH LONG-TERM CURRENT USE OF INSULIN (HCC): Primary | ICD-10-CM

## 2023-03-13 DIAGNOSIS — I10 PRIMARY HYPERTENSION: ICD-10-CM

## 2023-03-13 DIAGNOSIS — R26.2 AMBULATORY DYSFUNCTION: ICD-10-CM

## 2023-03-13 DIAGNOSIS — I72.9 ANEURYSM (HCC): ICD-10-CM

## 2023-03-13 DIAGNOSIS — Z79.4 TYPE 2 DIABETES MELLITUS WITH MICROALBUMINURIA, WITH LONG-TERM CURRENT USE OF INSULIN (HCC): Primary | ICD-10-CM

## 2023-03-13 DIAGNOSIS — R23.9 SUSPECTED PRESSURE INJURY OF DEEP TISSUE: ICD-10-CM

## 2023-03-15 PROBLEM — R23.9 SUSPECTED PRESSURE INJURY OF DEEP TISSUE: Status: ACTIVE | Noted: 2023-03-15

## 2023-03-15 RX ORDER — FUROSEMIDE 20 MG/1
20 TABLET ORAL 2 TIMES DAILY
COMMUNITY

## 2023-03-15 NOTE — ASSESSMENT & PLAN NOTE
Not in exacerbation  On daily prednisone  Continue Symbicort inhaler BID/ PRN Albuterol inhaler Q6 hours

## 2023-03-15 NOTE — PROGRESS NOTES
[address], [address] [place] [address], [address] [place] [address]  [phone]    NAME: Trent Crisostomo  AGE: 57 y o  SEX: female    Progress Note    Location: [place]  POS: 32 ([facility])    Assessment/Plan:    Type 2 diabetes mellitus with microalbuminuria, with long-term current use of insulin ([facility])    Lab Results   Component Value Date    HGBA1C 10 3 (H) 02/28/2023   Goal: < 8%  BG slowly trending up from admission  FBG range (3/9-13/2023) = 120 to 217  Pre-lunch BG range (3/9-13/2023) = 237 to 286   Pre-dinner BG range (3/8-13/2023) = 256 to 370  2100 BG range (3/8-13/2023) = 252 to 400  Currently on Lantus 34 units at HS and Humalog SSI for BG > 150  On daily Prednisone (PMR)  Ave meal completion: %  Continue CHO controlled with regular texture diet  Will continue to monitor for now - consider resuming scheduled Humalog if BG continues to trend up by next visit    Hypertension  BP goal: Less than or equal to 130/80 (per Neurology recommendation: aneurysm)  Per hospital note, uncontrolled SBP due to poor adherence to medication (current social situation)   With intermittent SBP elevation since admission to [facility]  BP range (3/8-13/2023) = 130/67 to 185/92  ** 4 episodes of SBP <  150 on this period  ** 5 episodes of SBP > 150 on this period  HR range (3/8-13/2023) = 71 to 82/min  Increase Furosemide to 20mg BID  Continue Losartan 100mg daily - with HOLD parameters  Continue Metoprolol tratrate 50mg Q12 hours  Continue ASA 81mg daily  If BP remain unchanged with increase in diuretic will consider increasing Metoprolol t 75mg Q12 hours on next visit  BMP and CBC wo diff scheduled on 3/14/2023    Polymyalgia rheumatica ([address] )  Per hosp note, unable to tolerate lower dose of Prednisone (severe pain and immobility)  Patient denies any new or worsening pain on this visit  On scheduled Prednisone 4mg daily (home dose) - recently resumed in hospital  Previously followed by Rheumatology   Last visit Feb, 2022    Polyneuropathy associated with underlying disease ([facility] [address] )  Multifactorial  Currently on Prednisone 4m g daily  Continue PT/OT  Continue SNF supportive care    Mild intermittent asthma without complication  Not in exacerbation  On daily prednisone  Continue Symbicort inhaler BID/ PRN Albuterol inhaler Q6 hours    Ambulatory dysfunction  Multifactorial  Continue PT/OT as scheduled  Aneurysm ([facility] [address] )  CT of head (3/12/2021):  "Two, 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"  Followed by [facility] Neurology office  Lsst seen on 6/9/2022  BP goal: less than or equal to 130/80 on average  Continue ASA 81mg daily     Suspected pressure injury of deep tissue  Site: B/L Heels  Non tender on assessment  Elevate and keep heels off mattress by either applying prevalon boots or elevated with 2 pillows when in bed  Start Skin prep to both heels daily while in SNF        Chief complaint / Reason for visit: Follow- visit    History of Present Illness: This is a 57 y o  Female patient currently admitted at [facility] (3/8/2023 to present) following discharge from acute care hospitalization ([facility]: 2/28/2023 to 3/8/2023) with Dx of Weakness, Diarrhea, Homelessness, Asymptomatic Bacteriuria, HTN, Depresses mood, Prolonged QT interval     Patient seen and examined today to follow-up acute on chronic medical conditions mentioned above and DM Type 2, Essential HTN, PMR, CKD3A, Mild Intermittent Asthma, Cerebral Aneurysm wo rupture  Patient is out of bed for this visit sitting at bedside -alert, cooperative, calm, pleasant and not in distress  Patient is verbally engaged with clear coherent speech -oriented to name/birthday/current date and place  Patient acknowledged feeling well on this visit, " I'm alright" -patient stated that she does not like the food in facility, " It's not the kind of food I usually eat at home"  Patient denies any acute medical concerns during ROS assessment including pain  Patient reported well through the night  On skin assessment on this visit - noted B/L heel intact blisters versus DTI, stasis dermatitis and B/L LE edema  Patient reported increased leg edema since discharged from hospital  Discussed starting compression stockings versus tubi  today - agreeable  Patient also instructed to keep both heels off mattress when in bed as well as elevate both legs as often as possible  Discussed about avoiding high sodium foods as often as possible (patient reported she is eating salted nuts: bedside)  Patient denies any GI/ related concerns on this visit  Nursing reported intermittent elevated BP since admission to [facility]  Reviewed BP/HR log today  Will increased Furosemide to 20mg BID for now and consider increasing Metoprolol to 75mg BID if showing no improvement by next visit  Nursing and prior provider notes reviewed on this visit  Discussed visit with PCP and nursing staff/ supervisor  Review of Systems:  Per history of present illness, all other systems reviewed and negative  HISTORY:  Medical Hx: Reviewed, unchanged  Family Hx: Reviewed, unchanged  Soc Hx: Reviewed,  unchanged    ALLERGY: Reviewed, unchanged  No Known Allergies     PHYSICAL EXAM:  Vital Signs: T98 0F -P75 -R16 BP: 160/95 SpO2: 96% RA  Weight: 335 2 lbs (3/8/2023)    General: NAD  Well appearing  No acute distress  With obesity  Head: Atraumatic  Normocephalic  Eye Exam: anicteric sclera, no discharge, PERRLA, No injection  Oral Exam: moist mucous membranes, no buccaloropharyngeal erythema, palatine tonsils WNL  Neck Exam: no anterior cervical lymphadenopathy noted, neck supple  Trachea midline, no carotid bruit, no masses  Cardiovascular: regular rate, regular rhythm, no murmurs, rubs, or gallops  S1 and S2  Pulmonary: no wheeze, no rhonchi, no rales  No chest tenderness  Normal chest wall expansion  Abdominal: soft, non-tender, nondistended, bowel sounds audible x 4 quadrants   No palpable hepatosplenomegaly, no tympany  : Non distended bladder  Continent  Daily BM since 3/11/2023: sot stools  Extremities and skin: (+2) B/L LE edema  Stasis dermatitis  Intact skin  DTI to B/L heels  Thickened toenails  Neurological: alert, cooperative and responsive, Oriented x 3  Cranial Nerves II-XII grossly intact, no tics, normal sensation to pressure and light touch   moving all 4 extremities symmetrically  Ambulatory dysfunction with deconditioning  Laboratory results / Imaging reviewed: Hard copy/ies in medical chart:    * BMP (3/8/2023) = WNL except:  Cl: 109 (H)  Glu: 161 (H)  EGFR: 58 (L)    * CBC wo diff (3/8/2023): Latest Reference Range & Units 03/08/23 06:11   WBC 4 31 - 10 16 Thousand/uL 12 29 (H)   Red Blood Cell Count 3 81 - 5 12 Million/uL 4 45   Hemoglobin 11 5 - 15 4 g/dL 10 4 (L)   HCT 34 8 - 46 1 % 34 8   MCV 82 - 98 fL 78 (L)   MCH 26 8 - 34 3 pg 23 4 (L)   MCHC 31 4 - 37 4 g/dL 29 9 (L)   RDW 11 6 - 15 1 % 17 2 (H)   Platelet Count 422 - 390 Thousands/uL 456 (H)   MPV 8 9 - 12 7 fL 10 8       * HbA1C (2/28/2023) = 10 3 (H)    * TSH (2/17/2023) =  2/368 (WNL)    * Viral Panel (2/28/2023) = Negative      Current Medications: All medications reviewed and updated in Nursing Home Chart    Please note: This note was completed in part utilizing a voice-recognition software may have been used in the preparation of this document  Grammatical errors, random word insertion, spelling mistakes, and incomplete sentences may be an occasional consequence of the system secondary to software limitations, ambient noise and hardware issues  Occasional wrong word or "sound-alike" substitutions may have occurred due to the inherent limitations of voice recognition software  At the time of dictation, efforts were made to edit, clarify and/or correct errors  Interpretation should be guided by context  Please read the chart carefully and recognize, using context, where substitutions have occurred   If you have any questions or concerns about the context, text or information contained within the body of this dictation, please contact myself, the provider, for further clarification        CONCETTA Lenz  3/15/2023

## 2023-03-15 NOTE — ASSESSMENT & PLAN NOTE
CT of head (3/12/2021):  "Two, 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"  Followed by [facility] Neu[facility] office  Lsst seen on 6/9/2022  BP goal: less than or equal to 130/80 on average    Continue ASA 81mg daily

## 2023-03-15 NOTE — ASSESSMENT & PLAN NOTE
Per hosp note, unable to tolerate lower dose of Prednisone (severe pain and immobility)  Patient denies any new or worsening pain on this visit  On scheduled Prednisone 4mg daily (home dose) - recently resumed in hospital  Previously followed by Rheumatology   Last visit Feb, 2022

## 2023-03-15 NOTE — ASSESSMENT & PLAN NOTE
Site: B/L Heels  Non tender on assessment  Elevate and keep heels off mattress by either applying prevalon boots or elevated with 2 pillows when in bed  Start Skin prep to both heels daily while in SNF

## 2023-03-15 NOTE — ASSESSMENT & PLAN NOTE
Lab Results   Component Value Date    HGBA1C 10 3 (H) 02/28/2023   Goal: < 8%  BG slowly trending up from admission  FBG range (3/9-13/2023) = 120 to 217  Pre-lunch BG range (3/9-13/2023) = 237 to 286   Pre-dinner BG range (3/8-13/2023) = 256 to 370  2100 BG range (3/8-13/2023) = 252 to 400  Currently on Lantus 34 units at  and Humalog SSI for BG > 150  On daily Prednisone (PMR)  Ave meal completion: %  Continue CHO controlled with regular texture diet  Will continue to monitor for now - consider resuming scheduled Humalog if BG continues to trend up by next visit

## 2023-03-15 NOTE — ASSESSMENT & PLAN NOTE
BP goal: Less than or equal to 130/80 (per Neurology recommendation: aneurysm)  Per hospital note, uncontrolled SBP due to poor adherence to medication (current social situation)   With intermittent SBP elevation since admission to SNF  BP range (3/8-13/2023) = 130/67 to 185/92  ** 4 episodes of SBP <  150 on this period  ** 5 episodes of SBP > 150 on this period  HR range (3/8-13/2023) = 71 to 82/min  Increase Furosemide to 20mg BID  Continue Losartan 100mg daily - with HOLD parameters  Continue Metoprolol tratrate 50mg Q12 hours  Continue ASA 81mg daily  If BP remain unchanged with increase in diuretic will consider increasing Metoprolol t 75mg Q12 hours on next visit    BMP and CBC wo diff scheduled on 3/14/2023

## 2023-03-17 ENCOUNTER — NURSING HOME VISIT (OUTPATIENT)
Dept: GERIATRICS | Facility: OTHER | Age: 63
End: 2023-03-17

## 2023-03-17 DIAGNOSIS — M35.3 POLYMYALGIA RHEUMATICA (HCC): ICD-10-CM

## 2023-03-17 DIAGNOSIS — R80.9 TYPE 2 DIABETES MELLITUS WITH MICROALBUMINURIA, WITH LONG-TERM CURRENT USE OF INSULIN (HCC): Primary | ICD-10-CM

## 2023-03-17 DIAGNOSIS — R23.9 SUSPECTED PRESSURE INJURY OF DEEP TISSUE: ICD-10-CM

## 2023-03-17 DIAGNOSIS — J45.20 MILD INTERMITTENT ASTHMA WITHOUT COMPLICATION: ICD-10-CM

## 2023-03-17 DIAGNOSIS — E11.29 TYPE 2 DIABETES MELLITUS WITH MICROALBUMINURIA, WITH LONG-TERM CURRENT USE OF INSULIN (HCC): Primary | ICD-10-CM

## 2023-03-17 DIAGNOSIS — Z79.4 TYPE 2 DIABETES MELLITUS WITH MICROALBUMINURIA, WITH LONG-TERM CURRENT USE OF INSULIN (HCC): Primary | ICD-10-CM

## 2023-03-17 DIAGNOSIS — I10 PRIMARY HYPERTENSION: ICD-10-CM

## 2023-03-17 DIAGNOSIS — G63 POLYNEUROPATHY ASSOCIATED WITH UNDERLYING DISEASE (HCC): ICD-10-CM

## 2023-03-17 DIAGNOSIS — R26.2 AMBULATORY DYSFUNCTION: ICD-10-CM

## 2023-03-17 DIAGNOSIS — D50.8 IRON DEFICIENCY ANEMIA SECONDARY TO INADEQUATE DIETARY IRON INTAKE: ICD-10-CM

## 2023-03-17 DIAGNOSIS — I72.9 ANEURYSM (HCC): ICD-10-CM

## 2023-03-19 ENCOUNTER — TELEPHONE (OUTPATIENT)
Dept: OTHER | Facility: OTHER | Age: 63
End: 2023-03-19

## 2023-03-20 RX ORDER — INSULIN LISPRO 100 [IU]/ML
5 INJECTION, SOLUTION INTRAVENOUS; SUBCUTANEOUS
COMMUNITY

## 2023-03-20 RX ORDER — AMINO ACIDS/PROTEIN HYDROLYS 11G-40/45
30 LIQUID IN PACKET (ML) ORAL 2 TIMES DAILY
COMMUNITY

## 2023-03-20 RX ORDER — LANOLIN ALCOHOL/MO/W.PET/CERES
1 CREAM (GRAM) TOPICAL 2 TIMES DAILY
COMMUNITY

## 2023-03-20 RX ORDER — METOPROLOL TARTRATE 50 MG/1
50 TABLET, FILM COATED ORAL EVERY 12 HOURS SCHEDULED
COMMUNITY

## 2023-03-20 RX ORDER — NICOTINE POLACRILEX 2 MG
1 LOZENGE BUCCAL DAILY
COMMUNITY

## 2023-03-20 RX ORDER — INSULIN GLARGINE 100 [IU]/ML
20 INJECTION, SOLUTION SUBCUTANEOUS 2 TIMES DAILY
COMMUNITY
End: 2023-04-01 | Stop reason: DRUGHIGH

## 2023-03-20 NOTE — ASSESSMENT & PLAN NOTE
Lab Results   Component Value Date    HGBA1C 10 3 (H) 02/28/2023   HGBA1C: 10 3 (H) 02/28/2023  Goal: < 8%  BG continues to trend up: multifactorial including inconsistent adherence to dietary recommendations  FBG range (3/9-17/2023) = 120 to 248  Pre-lunch BG range (3/9-17/2023) = 237 to 372   Pre-dinner BG range (3/8-17/2023) = 256 to 458  2100 BG range (3/8-17/2023) = 252 to 411  D/C Lantus 34 units at HS  Start Lantus 20 units every 12 hours (9AM/9PM)  Start HUmalog 5 units TID with meals  Continue Humalog SSI for BG > 150  On daily Prednisone (PMR)  Ave meal completion: %  Continue CHO controlled with regular texture diet

## 2023-03-20 NOTE — PROGRESS NOTES
[facility]  [address]  [phone]  [address]  POS: 31: SNF/Short Term Rehab      NAME: Lashon Herring  AGE: 57 y o  SEX: female  DATE OF ADMISSION: MARCH 8, 2023  DATE OF DISCHARGE: MARCH 19, 2022  DISCHARGE DISPOSITION: TRANSITION TO [facility]    Reason for admission: Patient was admitted from [facility] for rehabilitation after hospitalization for ambulatory dysfunction and deconditioning  This is a 57 y o  Female patient currently admitted at [facility] (3/8/2023 to present) following discharge from acute care hospitalization ([address]: 2/28/2023 to 3/8/2023) with Dx of Weakness, Diarrhea, Homelessness, Asymptomatic Bacteriuria, HTN, Depresses mood, Prolonged QT interval     Admission Diagnoses: Weakness  Discharge Diagnoses:   * Ambulatory dysfunction with deconditioning  * DM Type 2  * HTN  * PMR  * Polyneuropathy  * Intermittent Asthma  * Aneurysm  * Suspected DTI to B/L Heels  * THEA     Course of stay: Patient is currently admitted at [facility] for rehabilitation services due to ambulatory dysfunction and deconditioning  Patient received 24/7 SNF supportive care, PT/OT/ST services  Patient BG readings found to be trending up - multifactorial including poor adherence to diet recommendations  Insulin coverage adjusted  Otherwise to date, patient stay in [facility] has been uneventful  Per , patient will transition to [facility] effective March 18, 2023       Therapy Progress Note: Per PT/ OT/ ST progress note, patient is:    PT Progress note:  * Bed Mobility:  - Roll left and right = Independent  - Sit to lying = Independent  - Lying to sitting on side of bed = Independent    * Transfers:  - Sit to stand = Independent  - Chair/bed-to-chair transfer = Independent  - Toilet transfer = Independent  - Car transfer = Partial/moderate assistance  - Car transfer - P  Mod detail = Partial Assistance    * Ambulation:  - Walk 10 feet = Independent  - Walk 50 feet with Two Turns = Independent  - Walk 150 feet = Independent  - Walking 10 feet on uneven surfaces = Setup or clean-up assistance    * Stairs/Curbs:  - 1 step (curb) = Supervision or touching assistance  - 1 step (curb) - Sup  T detail = Touching Assistance  - 4 steps = Supervision or touching assistance  - 4 steps - Sup  T detail = Touching Assistance  - 12 steps = Dependent    * W/C Mobility:  - Resident uses a wheelchair and/or scooter? = No    * Other Picking up object = Independent    Mobility Score Mobility Performance Raw Score (ranges from 15 - 90; 90 being the highest function) = 77    Patient Progress & Response to Treatment: Patient has met long term/short term goals  Pt may ambulate ad arnold on unit  Physical Therapy D/C Reason: Achieved Highest Practicable Level    Pain:  - Can the patient Communicate pain? = Yes  - Test / Balance / Gait Timed Up and Go = 26 seconds  - Pain Intensity = 0/10  - Frequency = Daily  - Location: Both knees, legs  AM stiffness   - What relieves pain? = Movement  - What exacerbates pain? = Inactivity    Discharge Recommendations and Status Recommendations: Pt may ambulate ad arnold on unit with 2WW  Target Heart Rate Target Heart Rate Range:      Therapy Follow Up Established/Trained = Restorative Ambulation Program,Restorative Range of Motion Program Ambulation Program Established / Trained: Pt instructed to continue ambulation on unit with 2WW   Range of Motion Program Established / Trained: Pt instructed on written home program      Prognosis to Maintain CLOF = Good with consistent staff follow-through      OT Progress note:  * Eating = Independent    * Hygiene:  - Oral hygiene = Independent  - Toileting hygiene = Independent    * Transfers:  - Toilet transfer = Independent    * Bathing:  - Shower/bathe self = Independent    * Dressing:  - Upper body dressing = Independent  - Lower body dressing = Independent    * Putting on/taking off footwear = Independent    Self Care Performance Raw Score (ranges from 7 - 42; 42 being the highest function) = 42    Community ADLs = Not applicable    Patient Progress & Response to Treatment: Patient has demonstrated good progress toward goals, increasing ability to perform LB dressing and standing tasks with MI  Patient able to demonstrate good safety during transfers and mobility, increasing functional endurance with daily routines  Patient educated on compensatory and energy conservation techniques during ADL and light homemaking tasks to enable greater participation in daily routines  Patient has reached maximum potential from skilled services at this time  Patient to remain in LTC upon dc and agrees with dc at this time       Pain:  - Can the patient Communicate pain? = Yes    Occupational Therapy D/C Reason: Achieved Highest Practicable Level    Discharge Recommendations and Status Discharge Recommendations: RNP    Therapy Follow Up Established/Trained = Restorative ADL Program,Restorative Range of Motion Program ADL Program Established / Trained: see RNP Range of Motion Program Established / Trained: see RNP     Prognosis to Maintain CLOF = Excellent with consistent staff support      ST Progress note:  Prior Living Environment = Other (homeless)    Discharge Location = Other (unknown)    Patient Progress & Response to Treatment: pt demonstrated ability to participate in tasks of increased complexity but faces a complex challenge posed by need to navigate      Objective Tests - Results and Interpretation:  * Cognition: Pragmatic Skills = Within Functional Limits    * Problem Solving = Impaired  - How often does patient demonstrate adequate cog/com skills to complete routine/simple living tasks? = % of the time  - How often does patient demonstrate adequate cog/com skills to complete age-appropriate common daily living tasks? = % of the time  - How often does patient demonstrate adequate cog/com skills to complete ageappropriate complex living tasks? = 76-90% of the time  - How often does patient function safely without additional assistance/supervision due to cognitive deficits? = % of the time    * Memory = Within Functional Limits  - Short Term Memory = 80%  - Long Term Memory = N/A  - Procedural Memory = N/A    * Executive Function = Within Functional Limits  - Oriented To = Person, place, time, purpose and caregivers  - Alternating Attention = 80%  - Thought Organization = 85%    Global Deterioration Scale (GDS) = 3/7 - Mild cognitive decline    Discharge Recommendations and Status Discharge Recommendations: To facilitate optimal cognitive-communicative performance, the following strategies are recommended: visual aids to increase orientation/decrease wandering, structured, familiar environment to facilitate performance/responses and slower rate of speech to facilitate communication  Therapy Follow Up Established/Trained = Not Indicated at This Time    Prognosis to Maintain CLOF = Other Prognosis (poor based on homelessness)        Labs and testing performed during stay: Most recent lab results as below:     * CBC wo diff (3/14/2023):           HEMOGLOBIN 10 6  <= 10 4 (L: 3/8/2023) g/dL 11 5-14 5 L Final         HEMATOCRIT 33 5  <= 34 8 (WNL: 3/8/2023) % 35 0-43 0 L Final         WBC 10 9  <= 12 29 (H: 3/8/2023) thou/cmm 4 0-10 0 H Final         RBC 4 52 mill/cmm 3 70-4 70  Final         PLATELET COUNT 555  <= 456 (H: 3/8/2023) thou/cmm 140-350 H Final         MPV 9 8 fL 7 5-11 3  Final         MCV 74  <= 78 (L: 3/8/2023) fL  L Final         MCH 23 3  <= 23 4 (L: 3/8/2023) pg 26 0-34 0 L Final         MCHC 31 5  <= 29 9 (L: 3/8/2023) g/dL 32 0-37 0 L Final         RDW 17 8  <= 17 2 (H: 3/8/2023) % 12 0-16 0 H Final               * BMP (3/14/2023):   GLUCOSE 212 mg/dL 65-99 H Final         BUN 17 mg/dL 7-25  Final         CREATININE 0 91 mg/dL 0 40-1 10  Final  SODIUM 142 mmol/L 135-145  Final         POTASSIUM 4 6 mmol/L 3 5-5 2  Final         CHLORIDE 103 mmol/L 100-109  Final         CARBON DIOXIDE 32 mmol/L 21-31 H Final   Please note change in the reference range as of 02/22/23      CALCIUM 9 1 mg/dL 8 5-10 1  Final         ANION GAP 7  3-11  Final         eGFRcr 70 <= 58 (L: 3/8/2023)  >59  Final         eGFRcr COMMENT (Note)    Final       HISTORY:  Medical Hx: Reviewed, unchanged  Family Hx: Reviewed, unchanged  Soc Hx: Reviewed,  unchanged    ALLERGY: Reviewed, unchanged  No Known Allergies    Discharge Medications: See discharge medication list which was reviewed and signed  Status at time of discharge: Stable    Today's Visit: March 17, 2023    Subjective: " I'm alright"    Vitals:T98 0F -P83 -R16 BP: 145/80 SpO2: 96-97% RA  Weight: 328 0 lbs (3/15/2023) <= 335  2lbs (3/8/2023)    Review of Systems   Constitutional: Negative  HENT: Negative  Eyes: Negative  Respiratory: Negative  Cardiovascular: Negative  Gastrointestinal: Negative  Endocrine: Negative  Genitourinary: Negative  Musculoskeletal: Negative  Skin: Negative  Allergic/Immunologic: Negative  Neurological: Negative  Hematological: Negative  Psychiatric/Behavioral: Negative  All other systems reviewed and are negative  Exam: Physical Exam  Vitals and nursing note reviewed  Constitutional:       General: She is not in acute distress  Appearance: Normal appearance  She is obese  She is not ill-appearing, toxic-appearing or diaphoretic  HENT:      Head: Normocephalic and atraumatic  Right Ear: Tympanic membrane, ear canal and external ear normal  There is no impacted cerumen  Left Ear: Tympanic membrane, ear canal and external ear normal  There is no impacted cerumen  Nose: Nose normal  No congestion or rhinorrhea  Mouth/Throat:      Mouth: Mucous membranes are moist       Pharynx: Oropharynx is clear   No oropharyngeal exudate or posterior oropharyngeal erythema  Eyes:      General: No scleral icterus  Right eye: No discharge  Left eye: No discharge  Conjunctiva/sclera: Conjunctivae normal       Pupils: Pupils are equal, round, and reactive to light  Neck:      Vascular: No carotid bruit  Cardiovascular:      Rate and Rhythm: Normal rate and regular rhythm  Heart sounds: Normal heart sounds  No murmur heard  No friction rub  No gallop  Pulmonary:      Effort: Pulmonary effort is normal  No respiratory distress  Breath sounds: Normal breath sounds  No stridor  No wheezing, rhonchi or rales  Chest:      Chest wall: No tenderness  Abdominal:      General: Bowel sounds are normal  There is no distension  Palpations: Abdomen is soft  There is no mass  Tenderness: There is no abdominal tenderness  There is no right CVA tenderness, left CVA tenderness, guarding or rebound  Hernia: No hernia is present  Comments: Ave meal completion: %   Genitourinary:     Comments: Nondistended bladder  Continent of B/B  BM every 1-2 days  Last documented BM: 3/17/2023  Musculoskeletal:         General: No swelling, tenderness, deformity or signs of injury  Normal range of motion  Cervical back: Normal range of motion and neck supple  No rigidity or tenderness  No muscular tenderness  Right lower leg: Edema present  Left lower leg: Edema present  Comments: Ambulatory dysfunction  Chronic B/L LE +2 Edema  Lymphadenopathy:      Cervical: No cervical adenopathy  Skin:     General: Skin is warm and dry  Capillary Refill: Capillary refill takes less than 2 seconds  Coloration: Skin is not jaundiced or pale  Findings: No bruising, erythema, lesion or rash  Comments: B/L heels suspect DTI (versus intact blister)  Intact skin  Dry skin to B/L LE  No DTI to bony prominences  Stasis dermatitis to BLE  Thickened toenails     Neurological:      General: No focal deficit present  Mental Status: She is alert and oriented to person, place, and time  Mental status is at baseline  Gait: Gait abnormal    Psychiatric:         Mood and Affect: Mood normal          Behavior: Behavior normal          Thought Content: Thought content normal       Comments: Alert, cooperative, calm, pleasant in distress  Discussion with patient/family and further instructions:  -Fall precautions  -Aspiration precautions  -Bleeding precautions  -Monitor for signs/symptoms of infection  -Medication list was reviewed and signed  -DME form deemed not needed at this time    Follow-up Recommendations: NONE  Patient will transition to [facility]        Problem List Follow-up Recommendations:    Type 2 diabetes mellitus with microalbuminuria, with long-term current use of insulin ([facility])    Lab Results   Component Value Date    HGBA1C 10 3 (H) 02/28/2023   HGBA1C: 10 3 (H) 02/28/2023  Goal: < 8%  BG continues to trend up: multifactorial including inconsistent adherence to dietary recommendations  FBG range (3/9-17/2023) = 120 to 248  Pre-lunch BG range (3/9-17/2023) = 237 to 372   Pre-dinner BG range (3/8-17/2023) = 256 to 458  2100 BG range (3/8-17/2023) = 252 to 411  D/C Lantus 34 units at HS  Start Lantus 20 units every 12 hours (9AM/9PM)  Start HUmalog 5 units TID with meals  Continue Humalog SSI for BG > 150  On daily Prednisone (PMR)  Ave meal completion: %  Continue CHO controlled with regular texture diet    Hypertension  BP Goal: Less than or equal to 130/80 (per Neuro recommendation: aneurysm)  Per hospital note, uncontrolled SBP due to poor adherence to medication (current social situation)   With intermittent SBP elevation since admission to [facility]  Recent medication changes but minimal to no SBP improvement  BP/HR checkd BID in SNF  BP range (3/8-17/2023) = 130/67 to 185/92  ** 4 episodes of SBP <  150 on this period  ** 13 episodes of SBP > 150 on this period  HR range (3/8-17/2023) = 71 to 83/min  Continue the following meds:  * Furosemide 20mg BID (dose increased on 3/13/2023)  * Losartan 100mg daily - with HOLD parameters  * Metoprolol tratrate 75mg Q12 hours (dose increased on 3/14/2023)  * ASA 81mg daily  Patient denies pain on assessment  Start Amlodipine 5mg daily  Renal functions WNL (3/14/2023)    Polymyalgia rheumatica ([facility] )  Per hosp note and patient, unable to tolerate lower dose of Prednisone (cause severe pain and immobility)  Patient denies any new or worsening pain on this visit  Continue Prednisone 4mg daily  Previously followed by Rheumatology  Last visit Feb, 2022  Needs follow-up with Rheumatology office  Polyneuropathy associated with underlying disease ([facility] )  Multifactorial    Patient denies any new or worsening of symptoms  Continue supportive care    Mild intermittent asthma without complication  Not in exacerbation  Continue Symbicort inhaler BID/ PRN Albuterol inhaler Q6 hours    Aneurysm (HCC)  CT of head (3/12/2021):  "Two, 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"  Poorly controlled SBP  Continue ASA 81mg daily   Followed by [facility] Neurology office  Last seen on 6/9/2022  Needs follow-up with Neurology office    Suspected pressure injury of deep tissue  Site: B/L Heels  Stable    Elevate and keep heels off mattress by either applying prevalon boots or elevated with 2 pillows when in bed  Continue Skin prep to both heels daily    Ambulatory dysfunction  Multifactorial   Please see PT/OT discharge progress note above  [facility] will transition to [facility] on [facility]/18/2023    Anemia  Hbg/Hct levels stable (3/14/2023)  Start Theragran-M daily      CURRENT MEDICATION LIST IN [facility]:    Current Outpatient Medications:   •  acetaminophen (TYLENOL) 325 mg tablet, Take 650 mg by mouth every 6 (six) hours as needed for mild pain, Disp: , Rfl:   •  albuterol (Proventil HFA) 90 mcg/act inhaler, Inhale 2 puffs every 6 (six) hours as needed for wheezing for up to 14 days For another 24 hours use every 4 hours standing, Disp: 6 7 g, Rfl: 0  •  aspirin (ECOTRIN LOW STRENGTH) 81 mg EC tablet, Take 1 tablet (81 mg total) by mouth daily for 14 days, Disp: 14 tablet, Rfl: 0  •  atorvastatin (LIPITOR) 40 mg tablet, Take 1 tablet (40 mg total) by mouth daily for 14 days, Disp: 14 tablet, Rfl: 0  •  Blood Pressure Monitor KIT, Check blood pressures BID, Disp: 1 kit, Rfl: 0  •  budesonide-formoterol (Symbicort) 160-4 5 mcg/act inhaler, Inhale 1 puff 2 (two) times a day for 14 days Rinse mouth after use , Disp: 10 2 g, Rfl: 0  •  cholecalciferol (VITAMIN D3) 1,000 units tablet, Take 2,000 Units by mouth daily, Disp: , Rfl:   •  Continuous Blood Gluc  (FreeStyle Denisa 2 Marianna) ERIC, Use 1 each 4 (four) times a day, Disp: 1 each, Rfl: 0  •  Continuous Blood Gluc Sensor (FreeStyle Denisa 2 Sensor) MISC, Use 1 each every 14 (fourteen) days, Disp: 2 each, Rfl: 0  •  Emollient (eucerin) lotion, Apply 1 application   topically 2 (two) times a day = to B/L LE, Disp: , Rfl:   •  furosemide (LASIX) 20 mg tablet, Take 20 mg by mouth 2 (two) times a day, Disp: , Rfl:   •  insulin glargine (LANTUS) 100 units/mL subcutaneous injection, Inject 20 Units under the skin 2 (two) times a day = given (9AM/ 9PM), Disp: , Rfl:   •  insulin lispro (HumaLOG) 100 units/mL injection pen, Inject 5 Units under the skin 3 (three) times a day with meals = also have SSI for  and higher, Disp: , Rfl:   •  insulin lispro (HumaLOG) 100 units/mL injection, Inject under the skin Sliding scale, Disp: , Rfl:   •  Lancets (OneTouch Delica Plus [id number]) MISC, Use 1 each 3 (three) times a day, Disp: 300 each, Rfl: 0  •  loperamide (IMODIUM) 2 mg capsule, Take 1 capsule (2 mg total) by mouth 4 (four) times a day as needed for diarrhea (can have up to 8mg per day), Disp: 30 capsule, Rfl: 0  •  losartan (COZAAR) 100 MG tablet, Take 1 tablet (100 mg total) by mouth daily, Disp: 90 tablet, Rfl: 0  •  metoprolol tartrate (LOPRESSOR) 25 mg tablet, Take 25 mg by mouth every 12 (twelve) hours = give together with Metoprolol tartrate 50mg tablet ( total of 75mg) every 12 hours, Disp: , Rfl:   •  metoprolol tartrate (LOPRESSOR) 50 mg tablet, Take 50 mg by mouth every 12 (twelve) hours = give together with Metoprolol tartrate 25mg tablet (totl of 75mg) every 12 hours, Disp: , Rfl:   •  multivitamin-iron-minerals-folic acid (THERAPEUTIC-M) TABS tablet, Take 1 tablet by mouth daily, Disp: , Rfl:   •  Nutritional Supplements (ProSource No Carb) LIQD, Take 30 mL by mouth 2 (two) times a day, Disp: , Rfl:   •  Petrolatum-Zinc Oxide 57-17 % PSTE, Apply 1 application  topically 3 (three) times a day To sacral area - protective, Disp: , Rfl:   •  predniSONE 1 mg tablet, Take 4 tablets (4 mg total) by mouth daily, Disp: 30 tablet, Rfl: 0  •  Blood Glucose Monitoring Suppl (OneTouch Verio) w/Device KIT, Use 3 (three) times a day for 14 days, Disp: 1 kit, Rfl: 0  •  Emollient (eucerin) lotion, Apply 1 application  topically 2 (two) times a day To the legs, Disp: , Rfl:       Discharge Statement:  * Spent more than 30 minutes discharging the patient; greater than 50% spent on physical examination of patient, answering questions, discussion of plan of care, recommendations and providing post discharge instructions  Additional time spend on other discharge related activities including documentation  Please note: This note was completed in part utilizing a voice-recognition software may have been used in the preparation of this document  Grammatical errors, random word insertion, spelling mistakes, and incomplete sentences may be an occasional consequence of the system secondary to software limitations, ambient noise and hardware issues  Occasional wrong word or "sound-alike" substitutions may have occurred due to the inherent limitations of voice recognition software   At the time of dictation, efforts were made to edit, clarify and/or correct errors  Interpretation should be guided by context  Please read the chart carefully and recognize, using context, where substitutions have occurred  If you have any questions or concerns about the context, text or information contained within the body of this dictation, please contact myself, the provider, for further clarification        [address] Natalia Holstein  7/38/54171:20 PM

## 2023-03-21 NOTE — ASSESSMENT & PLAN NOTE
Site: B/L Heels  Stable    Elevate and keep heels off mattress by either applying prevalon boots or elevated with 2 pillows when in bed  Continue Skin prep to both heels daily

## 2023-03-21 NOTE — ASSESSMENT & PLAN NOTE
Per hosp note and patient, unable to tolerate lower dose of Prednisone (cause severe pain and immobility)  Patient denies any new or worsening pain on this visit  Continue Prednisone 4mg daily  Previously followed by Rheumatology  Last visit Feb, 2022  Needs follow-up with Rheumatology office

## 2023-03-21 NOTE — ASSESSMENT & PLAN NOTE
BP Goal: Less than or equal to 130/80 (per Neuro recommendation: aneurysm)  Per hospital note, uncontrolled SBP due to poor adherence to medication (current social situation)   With intermittent SBP elevation since admission to SNF  Recent medication changes but minimal to no SBP improvement  BP/HR checkd BID in SNF  BP range (3/8-17/2023) = 130/67 to 185/92  ** 4 episodes of SBP <  150 on this period  ** 13 episodes of SBP > 150 on this period  HR range (3/8-17/2023) = 71 to 83/min  Continue the following meds:  * Furosemide 20mg BID (dose increased on 3/13/2023)  * Losartan 100mg daily - with HOLD parameters  * Metoprolol tratrate 75mg Q12 hours (dose increased on 3/14/2023)  * ASA 81mg daily  Patient denies pain on assessment  Start Amlodipine 5mg daily  Renal functions WNL (3/14/2023)

## 2023-03-21 NOTE — ASSESSMENT & PLAN NOTE
Multifactorial   Please see PT/OT discharge progress note above  [facility] will transition to [facility] on [facility]/2023

## 2023-03-21 NOTE — ASSESSMENT & PLAN NOTE
CT of head (3/12/2021):  "Two, 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"  Poorly controlled SBP  Continue ASA 81mg daily   Followed by [facility] Neurology office  Last seen on 6/9/2022    Needs follow-up with Neurology office

## 2023-03-29 ENCOUNTER — NURSING HOME VISIT (OUTPATIENT)
Dept: GERIATRICS | Facility: OTHER | Age: 63
End: 2023-03-29

## 2023-03-29 DIAGNOSIS — R80.9 TYPE 2 DIABETES MELLITUS WITH MICROALBUMINURIA, WITH LONG-TERM CURRENT USE OF INSULIN (HCC): Primary | ICD-10-CM

## 2023-03-29 DIAGNOSIS — E11.29 TYPE 2 DIABETES MELLITUS WITH MICROALBUMINURIA, WITH LONG-TERM CURRENT USE OF INSULIN (HCC): Primary | ICD-10-CM

## 2023-03-29 DIAGNOSIS — Z79.4 TYPE 2 DIABETES MELLITUS WITH MICROALBUMINURIA, WITH LONG-TERM CURRENT USE OF INSULIN (HCC): Primary | ICD-10-CM

## 2023-04-01 RX ORDER — INSULIN GLARGINE 100 [IU]/ML
INJECTION, SOLUTION SUBCUTANEOUS EVERY 12 HOURS
COMMUNITY
End: 2023-04-07 | Stop reason: SDUPTHER

## 2023-04-01 NOTE — ASSESSMENT & PLAN NOTE
Lab Results   Component Value Date    HGBA1C 10 3 (H) 02/28/2023   HGBA1C: 10 3 (H) 02/28/2023  Goal: < 8%  BG continues to be poorly controlled   Multifactorial including poor dietary adherence to recommendations  = Per nursing, family brings in snacks at all times  FBG range (3/18-29/2023) = 111 to 251  Pre-lunch BG range (3/18-29/2023) = 157 to 326   Pre-dinner BG range (3/18-29/2023) = 190 to 576  2100 BG range (3/18-29/2023) = 280 to 400  Start Lantus 27 @ 9AM and Lantus 24 units @ 9PM  Continue Humalog 5 units TID with meals  Continue Humalog SSI for BG > 150  On daily Prednisone (PMR)  Ave meal completion: %  Continue CHO controlled with regular texture diet

## 2023-04-01 NOTE — PROGRESS NOTES
[address], [address] [place] [address], [address] [place] [address]  [phone]    NAME: Omar Martinez  AGE: 57 y o  SEX: female    Progress Note    Location: [place]  POS: 28 (LT)    Assessment/Plan:    Type 2 diabetes mellitus with microalbuminuria, with long-term current use of insulin ([facility])    Lab Results   Component Value Date    HGBA1C 10 3 (H) 02/28/2023   HGBA1C: 10 3 (H) 02/28/2023  Goal: < 8%  BG continues to be poorly controlled  Multifactorial including poor dietary adherence to recommendations  = Per nursing, family brings in snacks at all times  FBG range (3/18-29/2023) = 111 to 251  Pre-lunch BG range (3/18-29/2023) = 157 to 326   Pre-dinner BG range (3/18-29/2023) = 190 to 576  2100 BG range (3/18-29/2023) = 280 to 400  Start Lantus 27 @ 9AM and Lantus 24 units @ 9PM  Continue Humalog 5 units TID with meals  Continue Humalog SSI for BG > 150  On daily Prednisone (PMR)  Ave meal completion: %  Continue CHO controlled with regular texture diet      Chief complaint / Reason for visit:  Acute Visit    History of Present Illness: This is a 56-year-old female patient recently transitioned from SNF to [facility] on 3/19/2023  Patient is seen and examined today per nursing request for persistent elevated/uncontrolled BG  Per nursing, patient has intermittent elevated BG over 400 multiple times -patient was reported as having poor adherence to diabetic diet - multiple snacking throughout the day (don brings in food daily) and often gets defensive when reminded of recommendation and patient teachings BG control  Patient's started on scheduled regular insulin on 3/18/2023 (Humalog 5 units with meals) in addition to SSI for  and higher  Patient basal insulin also increased twice since in SNF  Reviewed BG record and found to still be poorly controlled  Will adjust basal insulin as above  Nursing and prior provider notes reviewed on this visit   Discussed visit with PCP and nursing staff/ supervisor  Review of Systems:  Per history of present illness, all other systems reviewed and negative  HISTORY:  Medical Hx: Reviewed, unchanged  Family Hx: Reviewed, unchanged  Soc Hx: Reviewed,  unchanged    ALLERGY: Reviewed, unchanged  No Known Allergies     PHYSICAL EXAM:  Vital Signs: T98 1F -P66 -R18 BP: 164/77 (3/28/2023) SpO2: 96% RA  Weight: 300 0 lbs (3/29/2023) <= 335 2 lbs (3/8/2023)    General: NAD  Well appearing  No acute distress  With obesity  Head: Atraumatic  Normocephalic  Eye Exam: anicteric sclera, no discharge, PERRLA, No injection  Oral Exam: moist mucous membranes, no buccaloropharyngeal erythema, palatine tonsils WNL  Neck Exam: no anterior cervical lymphadenopathy noted, neck supple  Trachea midline, no carotid bruit, no masses  Cardiovascular: regular rate, regular rhythm, no murmurs, rubs, or gallops  S1 and S2  Pulmonary: no wheeze, no rhonchi, no rales  No chest tenderness  Normal chest wall expansion  Abdominal: soft, non-tender, nondistended, bowel sounds audible x 4 quadrants  No palpable hepatosplenomegaly, no tympany  : Non distended bladder  Continent  Daily BM since 3/11/2023: sot stools  Extremities and skin: (+2) B/L LE edema  Stasis dermatitis  Intact skin  DTI to B/L heels  Thickened toenails  Neurological: alert, cooperative and responsive, Oriented x 3  Cranial Nerves II-XII grossly intact, no tics, normal sensation to pressure and light touch   moving all 4 extremities symmetrically  Ambulatory dysfunction with deconditioning  Laboratory results / Imaging reviewed: Hard copy/ies in medical chart  Current Medications: All medications reviewed and updated in Nursing Home Chart    Please note: This note was completed in part utilizing a voice-recognition software may have been used in the preparation of this document   Grammatical errors, random word insertion, spelling mistakes, and incomplete sentences may be an occasional consequence of the system "secondary to software limitations, ambient noise and hardware issues  Occasional wrong word or \"sound-alike\" substitutions may have occurred due to the inherent limitations of voice recognition software  At the time of dictation, efforts were made to edit, clarify and/or correct errors  Interpretation should be guided by context  Please read the chart carefully and recognize, using context, where substitutions have occurred  If you have any questions or concerns about the context, text or information contained within the body of this dictation, please contact myself, the provider, for further clarification        CONCETTA Alvares  4/1/2023    "

## 2023-04-05 ENCOUNTER — NURSING HOME VISIT (OUTPATIENT)
Dept: GERIATRICS | Facility: OTHER | Age: 63
End: 2023-04-05

## 2023-04-05 DIAGNOSIS — J45.20 MILD INTERMITTENT ASTHMA WITHOUT COMPLICATION: ICD-10-CM

## 2023-04-05 DIAGNOSIS — D50.8 IRON DEFICIENCY ANEMIA SECONDARY TO INADEQUATE DIETARY IRON INTAKE: ICD-10-CM

## 2023-04-05 DIAGNOSIS — R23.9 SUSPECTED PRESSURE INJURY OF DEEP TISSUE: ICD-10-CM

## 2023-04-05 DIAGNOSIS — Z76.0 ENCOUNTER FOR MEDICATION REFILL: ICD-10-CM

## 2023-04-05 DIAGNOSIS — R80.9 TYPE 2 DIABETES MELLITUS WITH MICROALBUMINURIA, WITH LONG-TERM CURRENT USE OF INSULIN (HCC): ICD-10-CM

## 2023-04-05 DIAGNOSIS — I72.9 ANEURYSM (HCC): ICD-10-CM

## 2023-04-05 DIAGNOSIS — R26.2 AMBULATORY DYSFUNCTION: Primary | ICD-10-CM

## 2023-04-05 DIAGNOSIS — G63 POLYNEUROPATHY ASSOCIATED WITH UNDERLYING DISEASE (HCC): ICD-10-CM

## 2023-04-05 DIAGNOSIS — Z59.00 HOMELESSNESS: ICD-10-CM

## 2023-04-05 DIAGNOSIS — Z79.4 TYPE 2 DIABETES MELLITUS WITH MICROALBUMINURIA, WITH LONG-TERM CURRENT USE OF INSULIN (HCC): ICD-10-CM

## 2023-04-05 DIAGNOSIS — E11.29 TYPE 2 DIABETES MELLITUS WITH MICROALBUMINURIA, WITH LONG-TERM CURRENT USE OF INSULIN (HCC): ICD-10-CM

## 2023-04-05 DIAGNOSIS — I10 ESSENTIAL HYPERTENSION: ICD-10-CM

## 2023-04-05 DIAGNOSIS — M35.3 POLYMYALGIA RHEUMATICA (HCC): ICD-10-CM

## 2023-04-05 DIAGNOSIS — J45.21 MILD INTERMITTENT ASTHMA WITH EXACERBATION: ICD-10-CM

## 2023-04-05 DIAGNOSIS — E78.49 OTHER HYPERLIPIDEMIA: ICD-10-CM

## 2023-04-05 DIAGNOSIS — I10 PRIMARY HYPERTENSION: ICD-10-CM

## 2023-04-05 SDOH — ECONOMIC STABILITY - HOUSING INSECURITY: HOMELESSNESS UNSPECIFIED: Z59.00

## 2023-04-07 RX ORDER — ATORVASTATIN CALCIUM 40 MG/1
40 TABLET, FILM COATED ORAL DAILY
Qty: 30 TABLET | Refills: 0 | Status: SHIPPED | OUTPATIENT
Start: 2023-04-07 | End: 2023-05-07

## 2023-04-07 RX ORDER — FUROSEMIDE 20 MG/1
20 TABLET ORAL 2 TIMES DAILY
Qty: 60 TABLET | Refills: 0 | Status: SHIPPED | OUTPATIENT
Start: 2023-04-07 | End: 2023-05-07

## 2023-04-07 RX ORDER — MELATONIN
2000 DAILY
Qty: 60 TABLET | Refills: 0 | Status: SHIPPED | OUTPATIENT
Start: 2023-04-07 | End: 2023-05-07

## 2023-04-07 RX ORDER — LOSARTAN POTASSIUM 100 MG/1
100 TABLET ORAL DAILY
Qty: 30 TABLET | Refills: 0 | Status: SHIPPED | OUTPATIENT
Start: 2023-04-07 | End: 2023-05-07

## 2023-04-07 RX ORDER — BUDESONIDE AND FORMOTEROL FUMARATE DIHYDRATE 160; 4.5 UG/1; UG/1
1 AEROSOL RESPIRATORY (INHALATION) 2 TIMES DAILY
Qty: 10.2 G | Refills: 0 | Status: SHIPPED | OUTPATIENT
Start: 2023-04-07 | End: 2023-05-07

## 2023-04-07 RX ORDER — HYDRALAZINE HYDROCHLORIDE 10 MG/1
10 TABLET, FILM COATED ORAL 3 TIMES DAILY
COMMUNITY
End: 2023-04-07 | Stop reason: SDUPTHER

## 2023-04-07 RX ORDER — INSULIN LISPRO 100 [IU]/ML
5 INJECTION, SOLUTION INTRAVENOUS; SUBCUTANEOUS
Qty: 4.5 ML | Refills: 0 | Status: SHIPPED | OUTPATIENT
Start: 2023-04-07 | End: 2023-05-07

## 2023-04-07 RX ORDER — NICOTINE POLACRILEX 2 MG
1 LOZENGE BUCCAL DAILY
Qty: 30 TABLET | Refills: 0 | Status: SHIPPED | OUTPATIENT
Start: 2023-04-07 | End: 2023-05-07

## 2023-04-07 RX ORDER — PREDNISONE 1 MG/1
4 TABLET ORAL DAILY
Qty: 120 TABLET | Refills: 0 | Status: SHIPPED | OUTPATIENT
Start: 2023-04-07 | End: 2023-05-07

## 2023-04-07 RX ORDER — METOPROLOL TARTRATE 50 MG/1
50 TABLET, FILM COATED ORAL EVERY 12 HOURS SCHEDULED
Qty: 60 TABLET | Refills: 0 | Status: SHIPPED | OUTPATIENT
Start: 2023-04-07 | End: 2023-05-07

## 2023-04-07 RX ORDER — DOCUSATE SODIUM 100 MG/1
100 CAPSULE, LIQUID FILLED ORAL 2 TIMES DAILY
Qty: 60 CAPSULE | Refills: 0 | Status: SHIPPED | OUTPATIENT
Start: 2023-04-07 | End: 2023-05-07

## 2023-04-07 RX ORDER — DOCUSATE SODIUM 100 MG/1
100 CAPSULE, LIQUID FILLED ORAL 2 TIMES DAILY
COMMUNITY
End: 2023-04-07 | Stop reason: SDUPTHER

## 2023-04-07 RX ORDER — ASPIRIN 81 MG/1
81 TABLET ORAL DAILY
Qty: 30 TABLET | Refills: 0 | Status: SHIPPED | OUTPATIENT
Start: 2023-04-07 | End: 2023-05-07

## 2023-04-07 RX ORDER — INSULIN GLARGINE 100 [IU]/ML
INJECTION, SOLUTION SUBCUTANEOUS
Qty: 15.3 ML | Refills: 0 | Status: SHIPPED | OUTPATIENT
Start: 2023-04-07 | End: 2023-05-07

## 2023-04-07 RX ORDER — LANOLIN ALCOHOL/MO/W.PET/CERES
1 CREAM (GRAM) TOPICAL 2 TIMES DAILY
Qty: 240 ML | Refills: 0 | Status: SHIPPED | OUTPATIENT
Start: 2023-04-07 | End: 2023-05-07

## 2023-04-07 RX ORDER — METOPROLOL TARTRATE 50 MG/1
50 TABLET, FILM COATED ORAL EVERY 12 HOURS SCHEDULED
COMMUNITY
End: 2023-04-07 | Stop reason: SDUPTHER

## 2023-04-07 RX ORDER — HYDRALAZINE HYDROCHLORIDE 10 MG/1
10 TABLET, FILM COATED ORAL 3 TIMES DAILY
Qty: 90 TABLET | Refills: 0 | Status: SHIPPED | OUTPATIENT
Start: 2023-04-07 | End: 2023-05-07

## 2023-04-07 NOTE — ASSESSMENT & PLAN NOTE
Patient reported unable to tolerate lower dose of Prednisone (cause severe pain and immobility)  Patient denies any new or worsening pain on this visit  Continue Prednisone 4mg daily  Followed and managed by Rheumatology  Last visit Feb, 2022  Needs follow-up with Rheumatology office

## 2023-04-07 NOTE — ASSESSMENT & PLAN NOTE
Lab Results   Component Value Date    HGBA1C 10 3 (H) 02/28/2023   Some improvement of BG with recent dose change   Multifactorial including poor dietary adherence to recommendations  = Per nursing, family brings in snacks at all times  Patient has an unopened CGM but opted not to use it    = prefers the conventional way (glucometer/strip)    FBG range (3/18/2023 to 4/5/2023) = 95 to 251  Pre-lunch BG range (3/18/2023 to 4/5/2023) = 157 to 326   Pre-dinner BG range (3/18/2023 to 4/5/2023) = 133 to 576  2100 BG range (3/18/2023 to 4/5/2023) = 251 to 400  Currently on the following meds:  * Lantus every 12 hours [27units @ 9AM/ 24units @ 9PM] - recently increased on 3/29/2023  * Humalog 5 units TID with meals  * Humalog SSI for BG > 150  Ave meal completion: 100%  Continue CHO controlled with regular texture diet

## 2023-04-07 NOTE — ASSESSMENT & PLAN NOTE
Per hospital note, homeless with son since Feb 2023  Patient wanted to return to shelter on discharge with plan to travel out of state with son (plan to stay with a friend in [place])  Will send 30 day supply script to pharmacy of choice upon discharge

## 2023-04-07 NOTE — ASSESSMENT & PLAN NOTE
Multifactorial   Please see therapy discharge progress note above  Patient ambulatory with walker without difficulty

## 2023-04-07 NOTE — ASSESSMENT & PLAN NOTE
Site: B/L Heels  Stable and improved  Continue to Elevate and keep heels off mattress: Prevalon boots or elevated with 2 pillows when in bed  Continue Skin prep to both heels daily

## 2023-04-07 NOTE — ASSESSMENT & PLAN NOTE
"Per imaging (CT of Head:3/12/2021): showed \"two 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\"  BP Goal: < 130/80 on average  Continues with intermittent SBP > 170: Multifactorial   Continue ASA 81mg daily  [facility]ed by [facility] Neurology office  Last seen on 6/9/2022  Needs follow-up with Neurology office    "

## 2023-04-07 NOTE — PROGRESS NOTES
[facility]  [address]  [phone]  [facility]: [address] [facility]  POS: 28: NF- Long Term      NAME: Devon Williamson  AGE: 57 y o  SEX: female  DATE OF ADMISSION: MARCH 19, 2023  DATE OF DISCHARGE: April 7, 2023  DISCHARGE DISPOSITION: [facility] Burroughs    Reason for admission: Patient was admitted from [facility] on 3/8/2023 for rehabilitation after hospitalization due to ambulatory dysfunction and deconditioning  Patient completed rehabilitation services on 3/19/2023 and decided to stay LTC for multifactorial reasons including but not limited to homelessness with long term plan to eventually return back to the community  Per SW, patient has expressed plan to discharge from LTCF on April 7, 2023 back to a  [facility] in [facility] and travel out of state with son after [facility])  Admission Diagnoses: Ambulatory dysfunction  Discharge Diagnoses:   * Ambulatory dysfunction  * DM Type 2  * PMR  * Polyneuropathy associated with underlying disease  * Aneurysm  * Primary HTN  * Asthma  * Anemia  * Suspected Pressure injury to B/L heels  * Homelessness      Therapy notes:   PT  * Bed mobility: Independent  * Transfers: Independent except for car transfer: needs moderate assistance  * Ambulation:  - Walk 10 feet = Independent  - Walk 50 feet with Two Turns = Independent  - Walk 150 feet = Independent  - Walking 10 feet on uneven surfaces = Setup or clean-up assistance   Stairs/Curbs:  - 1 step (curb) = Supervision or touching assistance  - 1 step (curb) - Sup  T detail = Touching Assistance  - 4 steps = Supervision or touching assistance  - 4 steps - Sup  T detail = Touching Assistance  - 12 steps = Dependent  * Other Picking up object = Independent    OT  * Eating = Independent  * Hygiene:  - Oral hygiene = Independent  - Toileting hygiene = Independent  * Transfers:  Toilet transfer = Independent  * Bathing: Shower/bathe self = "Independent  * Dressing:  - Upper body dressing = Independent  - Lower body dressing = Independent  - Putting on/taking off footwear = Independent      Labs and testing performed during stay: Most recent lab results as below:     * CBC (3/14/2023):           HEMOGLOBIN 10 6 g/dL 11 5-14 5 L Final         HEMATOCRIT 33 5 % 35 0-43 0 L Final         WBC 10 9 thou/cmm 4 0-10 0 H Final         RBC 4 52 mill/cmm 3 70-4 70  Final         PLATELET COUNT 744 thou/cmm 140-350 H Final         MPV 9 8 fL 7 5-11 3  Final         MCV 74 fL  L Final         MCH 23 3 pg 26 0-34 0 L Final         MCHC 31 5 g/dL 32 0-37 0 L Final         RDW 17 8 % 12 0-16 0 H Final               * BMP (3/14/2023):   GLUCOSE 212 mg/dL 65-99 H Final         BUN 17 mg/dL 7-25  Final         CREATININE 0 91 mg/dL 0 40-1 10  Final         SODIUM 142 mmol/L 135-145  Final         POTASSIUM 4 6 mmol/L 3 5-5 2  Final         CHLORIDE 103 mmol/L 100-109  Final         CARBON DIOXIDE 32 mmol/L 21-31 H Final   Please note change in the reference range as of 02/22/23      CALCIUM 9 1 mg/dL 8 5-10 1  Final         ANION GAP 7  3-11  Final         eGFRcr 70  >59  Final         eGFRcr COMMENT (Note)    Final        HISTORY:  Medical Hx: Reviewed, unchanged  Family Hx: Reviewed, unchanged  Soc Hx: Reviewed,  unchanged    ALLERGY: Reviewed, unchanged  No Known Allergies    Discharge Medications: See discharge medication list which was reviewed and signed  Status at time of discharge: Stable    Today's Visit: April 5, 2023    Subjective:\" I'm alright\"    Vitals:T98 0F -P64 -R17 BP: 138/69 SpO2: 96-97% RA  Weight: Weight: 297 0 lbs (4/5/2023) <= 335 2 lbs (3/8//2023)    Review of Systems   Constitutional: Negative  HENT: Negative  Eyes: Negative  Respiratory: Negative  Cardiovascular: Negative  Gastrointestinal: Negative  Endocrine: Negative  Genitourinary: Negative  Musculoskeletal: Negative  Skin: Negative    " Allergic/Immunologic: Negative  Neurological: Negative  Hematological: Negative  Psychiatric/Behavioral: Negative  All other systems reviewed and are negative  Exam: Physical Exam  Vitals and nursing note reviewed  Constitutional:       General: She is not in acute distress  Appearance: Normal appearance  She is obese  She is not ill-appearing, toxic-appearing or diaphoretic  HENT:      Head: Normocephalic and atraumatic  Right Ear: Tympanic membrane, ear canal and external ear normal  There is no impacted cerumen  Left Ear: Tympanic membrane, ear canal and external ear normal  There is no impacted cerumen  Nose: Nose normal  No congestion or rhinorrhea  Mouth/Throat:      Mouth: Mucous membranes are moist       Pharynx: Oropharynx is clear  No oropharyngeal exudate or posterior oropharyngeal erythema  Eyes:      General: No scleral icterus  Right eye: No discharge  Left eye: No discharge  Extraocular Movements: Extraocular movements intact  Conjunctiva/sclera: Conjunctivae normal       Pupils: Pupils are equal, round, and reactive to light  Neck:      Vascular: No carotid bruit  Cardiovascular:      Rate and Rhythm: Normal rate and regular rhythm  Heart sounds: Normal heart sounds  No murmur heard  No friction rub  No gallop  Pulmonary:      Effort: Pulmonary effort is normal  No respiratory distress  Breath sounds: Normal breath sounds  No stridor  No wheezing, rhonchi or rales  Chest:      Chest wall: No tenderness  Abdominal:      General: Bowel sounds are normal  There is no distension  Palpations: Abdomen is soft  There is no mass  Tenderness: There is no abdominal tenderness  There is no right CVA tenderness, left CVA tenderness, guarding or rebound  Hernia: No hernia is present  Comments: Ave meal completion: %   Genitourinary:     Comments: Non distended bladder  Continent of B/B   BM every 1-2 days  Musculoskeletal:         General: No swelling, tenderness, deformity or signs of injury  Normal range of motion  Cervical back: Normal range of motion and neck supple  No rigidity or tenderness  No muscular tenderness  Right lower leg: Edema present  Left lower leg: Edema present  Comments: Patient uses walker: TUG test: WNL - slow but steady gait  Chronic B/L LE edema  Lymphadenopathy:      Cervical: No cervical adenopathy  Skin:     General: Skin is warm  Capillary Refill: Capillary refill takes less than 2 seconds  Coloration: Skin is not jaundiced or pale  Findings: No bruising, erythema, lesion or rash  Comments: Intact skin  Resolving DTI to B/L heels - present on admission to SNF  Stasis dermatitis to B/L LE - improved  Neurological:      General: No focal deficit present  Mental Status: She is alert and oriented to person, place, and time  Mental status is at baseline  Comments: Verbally engaged with clear coherent speech - oriented to name/birthday/current date and place  Psychiatric:         Mood and Affect: Mood normal          Behavior: Behavior normal          Thought Content: Thought content normal       Comments: Alert, cooperative, calm and not in distress  BIMS: 15/15 (3/9/2023)  PHQ2: 0/2         Discussion with patient/family and further instructions:  -Fall precautions  -Recommended diet: VIPIN, Controlled CHO  -Weight loss  -Importance of BP control: less than or equal to 130/80  -Monitor for signs/symptoms of infection  -Medication list was reviewed and signed  -DME form deemed not needed at this time  Follow-up Recommendations:     * Please follow-up with your primary care physician within 7-10 days of discharge to review medication changes and current status  The patient still needs to set-up an appointment for AD FRY with PCP upon discharge  Please make every effort to attend this appointment   Should there be a "change in your medical condition and an earlier appointment is needed please call the number provided above  Problem List Follow-up Recommendations:    Ambulatory dysfunction  Multifactorial   Please see therapy discharge progress note above  Patient ambulatory with walker without difficulty    Type 2 diabetes mellitus with microalbuminuria, with long-term current use of insulin ([facility])    Lab Results   Component Value Date    HGBA1C 10 3 (H) 02/28/2023   Some improvement of BG with recent dose change   Multifactorial including poor dietary adherence to recommendations  = Per nursing, family brings in snacks at all times  Patient has an unopened CGM but opted not to use it  = prefers the conventional way (glucometer/strip)    FBG range (3/18/2023 to 4/5/2023) = 95 to 251  Pre-lunch BG range (3/18/2023 to 4/5/2023) = 157 to 326   Pre-dinner BG range (3/18/2023 to 4/5/2023) = 133 to 576  2100 BG range (3/18/2023 to 4/5/2023) = 251 to 400  Currently on the following meds:  * Lantus every 12 hours [27units @ 9AM/ 24units @ 9PM] - recently increased on 3/29/2023  * Humalog 5 units TID with meals  * Humalog SSI for BG > 150  Ave meal completion: 100%  Continue CHO controlled with regular texture diet    Polymyalgia rheumatica ([address] )  Patient reported unable to tolerate lower dose of Prednisone (cause severe pain and immobility)  Patient denies any new or worsening pain on this visit  Continue Prednisone 4mg daily  Followed and managed by Rheumatology  Last visit Feb, 2022  Needs follow-up with Rheumatology office  Polyneuropathy associated with underlying disease ([address] )  Patient denies any new or worsening of symptoms    Aneurysm ([address] )  Per imaging (CT of Head:3/12/2021): showed \"two 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\"  BP Goal: < 130/80 on average  Continues with intermittent SBP > 170: Multifactorial   Continue ASA 81mg daily  [facility]   Last seen on " 6/9/2022  Needs follow-up with Neurology office  Hypertension  BP Goal: < 130/80 (per Neuro recommendation: aneurysm)  Per hospital note, uncontrolled SBP due to poor adherence to medication (current social situation)   Continues with intermittent SBP > 170 despite medication dose changes  BP/HR checked BID  BP range (3/8/2023 to 4/5/2023) = 120/67 to 194/114  ** 4 episodes of  and lower on this period  HR range (3/8/2023 to 4/5/2023) = 62 to 83/min  Continue the following meds:  * Furosemide 20mg BID (dose increased on 3/13/2023)  * Losartan 100mg daily - with HOLD parameters  * Metoprolol tratrate 75mg Q12 hours (dose increased on 3/14/2023)  * Hydralazine 10mg every 8 hours (started on 3/30/2023)   * ASA 81mg daily  Patient denies pain on assessment  Renal functions WNL (3/14/2023)  Continue VIPIN diet  To follow-up with PCP upon discharge    Mild intermittent asthma without complication  Not in exacerbation     Continue Symbicort inhaler BID  Continue PRN Albuterol inhaler Q6 hours    Suspected pressure injury of deep tissue  Site: B/L Heels  Stable and improved  Continue to Elevate and keep heels off mattress: Prevalon boots or elevated with 2 pillows when in bed  Continue Skin prep to both heels daily    Anemia  Hbg/Hct levels stable (3/14/2023): 10 6/ 33 5 (L)  Continue Theragran-M daily    Homelessness  Per hospital note, homeless with son since Feb 2023  Patient wanted to return to shelter on discharge with plan to travel out of state with son (plan to stay with a friend in [place])  Will send 30 day supply script to pharmacy of choice upon discharge      CURRENT MEDICATION LIST IN [place]:    Current Outpatient Medications:   •  acetaminophen (TYLENOL) 325 mg tablet, Take 650 mg by mouth every 6 (six) hours as needed for mild pain, Disp: , Rfl:   •  aspirin (ECOTRIN LOW STRENGTH) 81 mg EC tablet, Take 1 tablet (81 mg total) by mouth daily, Disp: 30 tablet, Rfl: 0  •  atorvastatin (LIPITOR) 40 mg tablet, Take 1 tablet (40 mg total) by mouth daily, Disp: 30 tablet, Rfl: 0  •  Blood Pressure Monitor KIT, Check blood pressures BID, Disp: 1 kit, Rfl: 0  •  budesonide-formoterol (Symbicort) 160-4 5 mcg/act inhaler, Inhale 1 puff 2 (two) times a day Rinse mouth after use , Disp: 10 2 g, Rfl: 0  •  cholecalciferol (VITAMIN D3) 1,000 units tablet, Take 2 tablets (2,000 Units total) by mouth daily, Disp: 60 tablet, Rfl: 0  •  Continuous Blood Gluc  (FreeStyle Denisa 2 Erick) ERIC, Use 1 each 4 (four) times a day, Disp: 1 each, Rfl: 0  •  Continuous Blood Gluc Sensor (FreeStyle Denisa 2 Sensor) MISC, Use 1 each every 14 (fourteen) days, Disp: 2 each, Rfl: 0  •  docusate sodium (COLACE) 100 mg capsule, Take 1 capsule (100 mg total) by mouth 2 (two) times a day, Disp: 60 capsule, Rfl: 0  •  Emollient (eucerin) lotion, Apply 1 application  topically 2 (two) times a day = to B/L LE, Disp: 240 mL, Rfl: 0  •  furosemide (LASIX) 20 mg tablet, Take 1 tablet (20 mg total) by mouth 2 (two) times a day, Disp: 60 tablet, Rfl: 0  •  hydrALAZINE (APRESOLINE) 10 mg tablet, Take 1 tablet (10 mg total) by mouth 3 (three) times a day, Disp: 90 tablet, Rfl: 0  •  Insulin Glargine Solostar 100 UNIT/ML SOPN, Inject 0 27 mL (27 Units total) under the skin in the morning AND 0 24 mL (24 Units total) daily at bedtime  = Lantus 27 units at 9AM/ Lantus 24 units @ 9PM)  , Disp: 15 3 mL, Rfl: 0  •  insulin lispro (HumaLOG) 100 units/mL injection pen, Inject 5 Units under the skin 3 (three) times a day with meals = also have SSI for  and higher, Disp: 4 5 mL, Rfl: 0  •  insulin lispro (HumaLOG) 100 units/mL injection, Inject under the skin Sliding scale   Inject as per sliding scale:  if 0 - 150 = 0 unit;  151 - 200 = 2 units;  201 - 250 = 4 units;  251 - 300 = 6 units;  301 - 350 = 8 units;  351 - 400 = 10 units less than 70 or above 400 notify MD,  subcutaneously before meals and at bedtime for DM, Disp: , Rfl:   •  Lancets (OneTouch Delica Plus [id number]) MISC, Use 1 each 3 (three) times a day, Disp: 300 each, Rfl: 0  •  loperamide (IMODIUM) 2 mg capsule, Take 1 capsule (2 mg total) by mouth 4 (four) times a day as needed for diarrhea (can have up to 8mg per day), Disp: 30 capsule, Rfl: 0  •  losartan (COZAAR) 100 MG tablet, Take 1 tablet (100 mg total) by mouth daily, Disp: 30 tablet, Rfl: 0  •  metoprolol tartrate (LOPRESSOR) 25 mg tablet, Take 1 tablet (25 mg total) by mouth every 12 (twelve) hours = Take together with Metoprolol tartrate 50mg for a total of 75mg every 12 hours - HOLD for SBP less than 100 or HR less than 55/min, Disp: 60 tablet, Rfl: 0  •  metoprolol tartrate (LOPRESSOR) 50 mg tablet, Take 1 tablet (50 mg total) by mouth every 12 (twelve) hours = Take together with Metoprolol tartrate 25mg for a total of 75mg every 12 hours - HOLD for SBP less than 100 or HR less than 55/min, Disp: 60 tablet, Rfl: 0  •  multivitamin-iron-minerals-folic acid (THERAPEUTIC-M) TABS tablet, Take 1 tablet by mouth daily, Disp: 30 tablet, Rfl: 0  •  Nutritional Supplements (ProSource No Carb) LIQD, Take 30 mL by mouth 2 (two) times a day, Disp: , Rfl:   •  Petrolatum-Zinc Oxide 57-17 % PSTE, Apply 1 application  topically 3 (three) times a day To sacral area - protective, Disp: , Rfl:   •  predniSONE 1 mg tablet, Take 4 tablets (4 mg total) by mouth daily, Disp: 120 tablet, Rfl: 0  •  Blood Glucose Monitoring Suppl (OneTouch Verio) w/Device KIT, Use 3 (three) times a day for 14 days, Disp: 1 kit, Rfl: 0      Discharge Statement:  * Spent more than 30 minutes discharging the patient; greater than 50% spent on physical examination of patient, answering questions, discussion of plan of care, recommendations and providing post discharge instructions  Additional time spend on other discharge related activities including documentation  Please note:  This note was completed in part utilizing a voice-recognition software may have been used in the "preparation of this document  Grammatical errors, random word insertion, spelling mistakes, and incomplete sentences may be an occasional consequence of the system secondary to software limitations, ambient noise and hardware issues  Occasional wrong word or \"sound-alike\" substitutions may have occurred due to the inherent limitations of voice recognition software  At the time of dictation, efforts were made to edit, clarify and/or correct errors  Interpretation should be guided by context  Please read the chart carefully and recognize, using context, where substitutions have occurred  If you have any questions or concerns about the context, text or information contained within the body of this dictation, please contact myself, the provider, for further clarification        [address] [facility]  4/7/20235:12 AM    "

## 2023-04-07 NOTE — ASSESSMENT & PLAN NOTE
BP Goal: < 130/80 (per Neuro recommendation: aneurysm)  Per hospital note, uncontrolled SBP due to poor adherence to medication (current social situation)   Continues with intermittent SBP > 170 despite medication dose changes  BP/HR checked BID  BP range (3/8/2023 to 4/5/2023) = 120/67 to 194/114  ** 4 episodes of  and lower on this period  HR range (3/8/2023 to 4/5/2023) = 62 to 83/min  Continue the following meds:  * Furosemide 20mg BID (dose increased on 3/13/2023)  * Losartan 100mg daily - with HOLD parameters  * Metoprolol tratrate 75mg Q12 hours (dose increased on 3/14/2023)  * Hydralazine 10mg every 8 hours (started on 3/30/2023)   * ASA 81mg daily  Patient denies pain on assessment  Renal functions WNL (3/14/2023)  Continue VIPIN diet  To follow-up with PCP upon discharge

## 2023-04-12 PROBLEM — R93.89 ABNORMAL CT SCAN: Status: ACTIVE | Noted: 2023-04-12

## 2023-04-12 PROBLEM — A41.9 SEPSIS WITHOUT ACUTE ORGAN DYSFUNCTION (HCC): Status: ACTIVE | Noted: 2023-04-12

## 2023-04-12 PROBLEM — A40.9 SEPSIS DUE TO STREPTOCOCCUS SPECIES WITHOUT ACUTE ORGAN DYSFUNCTION (HCC): Status: ACTIVE | Noted: 2023-04-12

## 2023-04-12 PROBLEM — E11.621 DIABETIC FOOT ULCERS (HCC): Status: ACTIVE | Noted: 2023-04-12

## 2023-04-12 PROBLEM — R65.10 SIRS (SYSTEMIC INFLAMMATORY RESPONSE SYNDROME) (HCC): Status: ACTIVE | Noted: 2023-04-12

## 2023-04-12 PROBLEM — K80.20 CALCULUS OF GALLBLADDER WITHOUT CHOLECYSTITIS WITHOUT OBSTRUCTION: Status: ACTIVE | Noted: 2023-04-12

## 2023-04-12 PROBLEM — L97.509 DIABETIC FOOT ULCERS (HCC): Status: ACTIVE | Noted: 2023-04-12

## 2023-04-30 PROBLEM — R05.9 COUGH: Status: RESOLVED | Noted: 2023-03-01 | Resolved: 2023-04-30

## 2023-05-08 ENCOUNTER — HOSPITAL ENCOUNTER (INPATIENT)
Facility: HOSPITAL | Age: 63
LOS: 9 days | Discharge: HOME | End: 2023-05-18
Attending: EMERGENCY MEDICINE | Admitting: FAMILY MEDICINE

## 2023-05-08 DIAGNOSIS — Z79.4 TYPE 2 DIABETES MELLITUS WITH MICROALBUMINURIA, WITH LONG-TERM CURRENT USE OF INSULIN (HCC): ICD-10-CM

## 2023-05-08 DIAGNOSIS — E11.65 UNCONTROLLED TYPE 2 DIABETES MELLITUS WITH HYPERGLYCEMIA (HCC): ICD-10-CM

## 2023-05-08 DIAGNOSIS — E11.29 TYPE 2 DIABETES MELLITUS WITH MICROALBUMINURIA, WITH LONG-TERM CURRENT USE OF INSULIN (HCC): ICD-10-CM

## 2023-05-08 DIAGNOSIS — E11.40 DIABETIC NEUROPATHY (HCC): ICD-10-CM

## 2023-05-08 DIAGNOSIS — H54.8 LEGALLY BLIND: ICD-10-CM

## 2023-05-08 DIAGNOSIS — R80.9 TYPE 2 DIABETES MELLITUS WITH MICROALBUMINURIA, WITH LONG-TERM CURRENT USE OF INSULIN (HCC): ICD-10-CM

## 2023-05-08 DIAGNOSIS — Z79.4 TYPE 2 DIABETES MELLITUS WITH HYPERGLYCEMIA, WITH LONG-TERM CURRENT USE OF INSULIN (HCC): ICD-10-CM

## 2023-05-08 DIAGNOSIS — M35.3 POLYMYALGIA RHEUMATICA (HCC): ICD-10-CM

## 2023-05-08 DIAGNOSIS — Z59.00 HOMELESSNESS: ICD-10-CM

## 2023-05-08 DIAGNOSIS — S91.302A OPEN WOUND OF LEFT HEEL, INITIAL ENCOUNTER: Primary | ICD-10-CM

## 2023-05-08 DIAGNOSIS — S90.829A: ICD-10-CM

## 2023-05-08 DIAGNOSIS — L97.509 DIABETIC FOOT ULCERS (HCC): ICD-10-CM

## 2023-05-08 DIAGNOSIS — E11.65 TYPE 2 DIABETES MELLITUS WITH HYPERGLYCEMIA, WITH LONG-TERM CURRENT USE OF INSULIN (HCC): ICD-10-CM

## 2023-05-08 DIAGNOSIS — E11.621 DIABETIC FOOT ULCERS (HCC): ICD-10-CM

## 2023-05-08 LAB
ANION GAP SERPL CALCULATED.3IONS-SCNC: 2 MMOL/L (ref 4–13)
BASOPHILS # BLD AUTO: 0.06 THOUSANDS/ÂΜL (ref 0–0.1)
BASOPHILS NFR BLD AUTO: 1 % (ref 0–1)
BUN SERPL-MCNC: 22 MG/DL (ref 5–25)
CALCIUM SERPL-MCNC: 8.9 MG/DL (ref 8.3–10.1)
CHLORIDE SERPL-SCNC: 115 MMOL/L (ref 96–108)
CO2 SERPL-SCNC: 26 MMOL/L (ref 21–32)
CREAT SERPL-MCNC: 1.01 MG/DL (ref 0.6–1.3)
EOSINOPHIL # BLD AUTO: 0.36 THOUSAND/ÂΜL (ref 0–0.61)
EOSINOPHIL NFR BLD AUTO: 4 % (ref 0–6)
ERYTHROCYTE [DISTWIDTH] IN BLOOD BY AUTOMATED COUNT: 18.4 % (ref 11.6–15.1)
GFR SERPL CREATININE-BSD FRML MDRD: 58 ML/MIN/1.73SQ M
GLUCOSE SERPL-MCNC: 152 MG/DL (ref 65–140)
GLUCOSE SERPL-MCNC: 156 MG/DL (ref 65–140)
GLUCOSE SERPL-MCNC: 178 MG/DL (ref 65–140)
HCT VFR BLD AUTO: 34.6 % (ref 34.8–46.1)
HGB BLD-MCNC: 10.3 G/DL (ref 11.5–15.4)
IMM GRANULOCYTES # BLD AUTO: 0.06 THOUSAND/UL (ref 0–0.2)
IMM GRANULOCYTES NFR BLD AUTO: 1 % (ref 0–2)
LYMPHOCYTES # BLD AUTO: 2.12 THOUSANDS/ÂΜL (ref 0.6–4.47)
LYMPHOCYTES NFR BLD AUTO: 21 % (ref 14–44)
MCH RBC QN AUTO: 23 PG (ref 26.8–34.3)
MCHC RBC AUTO-ENTMCNC: 29.8 G/DL (ref 31.4–37.4)
MCV RBC AUTO: 77 FL (ref 82–98)
MONOCYTES # BLD AUTO: 0.76 THOUSAND/ÂΜL (ref 0.17–1.22)
MONOCYTES NFR BLD AUTO: 8 % (ref 4–12)
NEUTROPHILS # BLD AUTO: 6.76 THOUSANDS/ÂΜL (ref 1.85–7.62)
NEUTS SEG NFR BLD AUTO: 65 % (ref 43–75)
NRBC BLD AUTO-RTO: 0 /100 WBCS
PLATELET # BLD AUTO: 323 THOUSANDS/UL (ref 149–390)
PMV BLD AUTO: 10.5 FL (ref 8.9–12.7)
POTASSIUM SERPL-SCNC: 3.5 MMOL/L (ref 3.5–5.3)
RBC # BLD AUTO: 4.47 MILLION/UL (ref 3.81–5.12)
SODIUM SERPL-SCNC: 143 MMOL/L (ref 135–147)
WBC # BLD AUTO: 10.12 THOUSAND/UL (ref 4.31–10.16)

## 2023-05-08 RX ORDER — HEPARIN SODIUM 5000 [USP'U]/ML
7500 INJECTION, SOLUTION INTRAVENOUS; SUBCUTANEOUS EVERY 8 HOURS SCHEDULED
Status: DISCONTINUED | OUTPATIENT
Start: 2023-05-08 | End: 2023-05-18 | Stop reason: HOSPADM

## 2023-05-08 RX ORDER — LOSARTAN POTASSIUM 50 MG/1
50 TABLET ORAL DAILY
Status: DISCONTINUED | OUTPATIENT
Start: 2023-05-09 | End: 2023-05-10

## 2023-05-08 RX ORDER — DOCUSATE SODIUM 100 MG/1
100 CAPSULE, LIQUID FILLED ORAL 2 TIMES DAILY
Status: DISCONTINUED | OUTPATIENT
Start: 2023-05-08 | End: 2023-05-18 | Stop reason: HOSPADM

## 2023-05-08 RX ORDER — INSULIN GLARGINE 100 [IU]/ML
10 INJECTION, SOLUTION SUBCUTANEOUS
Status: DISCONTINUED | OUTPATIENT
Start: 2023-05-08 | End: 2023-05-10

## 2023-05-08 RX ORDER — BUDESONIDE AND FORMOTEROL FUMARATE DIHYDRATE 160; 4.5 UG/1; UG/1
1 AEROSOL RESPIRATORY (INHALATION) 2 TIMES DAILY
Status: DISCONTINUED | OUTPATIENT
Start: 2023-05-08 | End: 2023-05-18 | Stop reason: HOSPADM

## 2023-05-08 RX ORDER — INSULIN LISPRO 100 [IU]/ML
2-12 INJECTION, SOLUTION INTRAVENOUS; SUBCUTANEOUS
Status: DISCONTINUED | OUTPATIENT
Start: 2023-05-08 | End: 2023-05-17

## 2023-05-08 RX ORDER — PREDNISONE 1 MG/1
4 TABLET ORAL DAILY
Status: DISCONTINUED | OUTPATIENT
Start: 2023-05-09 | End: 2023-05-18 | Stop reason: HOSPADM

## 2023-05-08 RX ORDER — MELATONIN
2000 DAILY
Status: DISCONTINUED | OUTPATIENT
Start: 2023-05-09 | End: 2023-05-18 | Stop reason: HOSPADM

## 2023-05-08 RX ORDER — GABAPENTIN 100 MG/1
100 CAPSULE ORAL 2 TIMES DAILY
Status: DISCONTINUED | OUTPATIENT
Start: 2023-05-08 | End: 2023-05-18 | Stop reason: HOSPADM

## 2023-05-08 RX ORDER — ATORVASTATIN CALCIUM 40 MG/1
40 TABLET, FILM COATED ORAL DAILY
Status: DISCONTINUED | OUTPATIENT
Start: 2023-05-09 | End: 2023-05-18 | Stop reason: HOSPADM

## 2023-05-08 RX ORDER — TOPIRAMATE 100 MG/1
100 TABLET, FILM COATED ORAL
Status: DISCONTINUED | OUTPATIENT
Start: 2023-05-08 | End: 2023-05-18 | Stop reason: HOSPADM

## 2023-05-08 RX ADMIN — HEPARIN SODIUM 7500 UNITS: 5000 INJECTION INTRAVENOUS; SUBCUTANEOUS at 17:35

## 2023-05-08 RX ADMIN — GABAPENTIN 100 MG: 100 CAPSULE ORAL at 17:35

## 2023-05-08 RX ADMIN — DOCUSATE SODIUM 100 MG: 100 CAPSULE, LIQUID FILLED ORAL at 17:35

## 2023-05-08 RX ADMIN — INSULIN GLARGINE 10 UNITS: 100 INJECTION, SOLUTION SUBCUTANEOUS at 21:58

## 2023-05-08 RX ADMIN — TOPIRAMATE 100 MG: 100 TABLET, FILM COATED ORAL at 21:58

## 2023-05-08 RX ADMIN — INSULIN LISPRO 2 UNITS: 100 INJECTION, SOLUTION INTRAVENOUS; SUBCUTANEOUS at 17:35

## 2023-05-08 SDOH — ECONOMIC STABILITY - HOUSING INSECURITY: HOMELESSNESS UNSPECIFIED: Z59.00

## 2023-05-08 NOTE — ASSESSMENT & PLAN NOTE
Socially complex history, patient and her son became homeless back in February and the patient has had multiple ED visits, admissions to both hospital and STRs  Last discharged from rehab this past week, has been homeless since     Affecting her access to care and medications  · CM referral placed  · Patient would maybe benefit from pill packs on discharge given her blindness

## 2023-05-08 NOTE — ASSESSMENT & PLAN NOTE
Lab Results   Component Value Date    HGBA1C 11 2 (H) 04/19/2023       Recent Labs     05/16/23  1137 05/16/23  1559 05/16/23 2056 05/17/23  0622   POCGLU 139 163* 233* 121       Blood Sugar Average: Last 72 hrs:  (P) 153 75   Uncontrolled DM with diabetic retinopathy (legally blind), CKD stage III  Home regimen: Lantus 25 units twice daily, Humalog 5 units 3 times daily with meals  Has not been on her diabetes medications since discharge from recent rehab  · Lantus 26 units qhs   · Continue SSI with glucose checks   · Continue Metformin 1000mg BID, tolerating well

## 2023-05-08 NOTE — PROGRESS NOTES
Pastoral Care Progress Note    2023  Patient: Link Runner : 1960  Admission Date & Time: 2023 1114  MRN: [id number] [facility]: [id number]                     Chaplaincy Interventions Utilized:   Empowerment: Encouraged focus on present and Encouraged self-care    Exploration: Explored hope      Relationship Building: Cultivated a relationship of care and support, Listened empathically, and Hospitality    Ritual: Provided prayer        Chaplaincy Outcomes Achieved:  Expressed gratitude, Expressed intermediate hope, and Expressed ultimate hope    Patient expressed her deep appreciation for everything she receives from [facility]  Spiritual Coping Strategies Utilized:   Spiritual comfort, Spiritual gratitude, and Surrender           23 1800   Clinical Encounter Type   Visited With Patient and family together   Routine Visit Introduction   Referral From [address] Needs Prayer   Patient Spiritual Encounters   Spiritual Encounter Notes Etelvina Pollock provided supportive visit with pt and son, including prayer

## 2023-05-08 NOTE — ED PROVIDER NOTES
History  Chief Complaint   Patient presents with   • Wound Check     Pt states she had blisters on left heel x a few weeks that opened up, son noticed the wound bleeding again and wanted her to come get it checked out  Pt has hx of diabetes, no fevers  Juancarlos Cuevas is a 1year-old woman with medical history significant for poorly controlled diabetes, legal blindness, CHF, hypertension, hyperlipidemia, presenting with a left heel wound  She was recently discharged from rehab for another foot wound  That foot wound had healed  She noticed that she had developed this new 1 yesterday  She comes with her son who recommended that she be evaluated in the emergency department  She is currently homeless and does not have a place to stay  Her son is also homeless  She has a poor understanding of her diabetic medications and is not sure how to take them  She is currently on Jardiance and glargine, but does not know how to use them  She has no fevers, chills, nausea, vomiting  She has not noticed any purulent drainage from the foot wound  She does not follow with wound care  Prior to Admission Medications   Prescriptions Last Dose Informant Patient Reported? Taking? Blood Glucose Monitoring Suppl (OneTouch Verio) w/Device KIT   No No   Sig: Use 3 (three) times a day for 14 days   Blood Pressure Monitor KIT   No No   Sig: Check blood pressures BID   Continuous Blood Gluc  (FreeStyle Denisa 2 Sed[place]) ERIC   No No   Sig: Use 1 each 4 (four) times a day   Continuous Blood Gluc Sensor (FreeStyle Denisa 2 Sensor) MISC   No No   Sig: Use 1 each every 14 (fourteen) days   Emollient (eucerin) lotion   No No   Sig: Apply 1 application  topically 2 (two) times a day = to B/L LE   Insulin Glargine Solostar 100 UNIT/ML SOPN   No No   Sig: Inject 0 27 mL (27 Units total) under the skin in the morning AND 0 24 mL (24 Units total) daily at bedtime  = Lantus 27 units at 9AM/ Lantus 24 units @ 9PM)     Lancets (OneTouch Delica Plus [id number]) MISC   No No   Sig: Use 1 each 3 (three) times a day   Nutritional Supplements (ProSource No Carb) LIQD   Yes No   Sig: Take 30 mL by mouth 2 (two) times a day   Petrolatum-Zinc Oxide 57-17 % PSTE   Yes No   Sig: Apply 1 application  topically 3 (three) times a day To sacral area - protective   acetaminophen (TYLENOL) 325 mg tablet   Yes No   Sig: Take 650 mg by mouth every 6 (six) hours as needed for mild pain   amLODIPine (NORVASC) 5 mg tablet   No No   Sig: Take 1 tablet (5 mg total) by mouth daily Do not start before April 19, 2023  aspirin (ECOTRIN LOW STRENGTH) 81 mg EC tablet   No No   Sig: Take 1 tablet (81 mg total) by mouth daily   atorvastatin (LIPITOR) 40 mg tablet   No No   Sig: Take 1 tablet (40 mg total) by mouth daily   budesonide-formoterol (Symbicort) 160-4 5 mcg/act inhaler   No No   Sig: Inhale 1 puff 2 (two) times a day Rinse mouth after use  cholecalciferol (VITAMIN D3) 1,000 units tablet   No No   Sig: Take 2 tablets (2,000 Units total) by mouth daily   docusate sodium (COLACE) 100 mg capsule   No No   Sig: Take 1 capsule (100 mg total) by mouth 2 (two) times a day   furosemide (LASIX) 20 mg tablet   No No   Sig: Take 1 tablet (20 mg total) by mouth 2 (two) times a day   gabapentin (NEURONTIN) 100 mg capsule   No No   Sig: Take 1 capsule (100 mg total) by mouth 2 (two) times a day   hydrALAZINE (APRESOLINE) 10 mg tablet   No No   Sig: Take 1 tablet (10 mg total) by mouth 3 (three) times a day   insulin lispro (HumaLOG) 100 units/mL injection   Yes No   Sig: Inject under the skin Sliding scale   Inject as per sliding scale:  if 0 - 150 = 0 unit;  151 - 200 = 2 units;  201 - 250 = 4 units;  251 - 300 = 6 units;  301 - 350 = 8 units;  351 - 400 = 10 units less than 70 or above 400 notify MD,  subcutaneously before meals and at bedtime for DM   insulin lispro (HumaLOG) 100 units/mL injection pen   No No   Sig: Inject 5 Units under the skin 3 (three) times a day with meals = also have SSI for  and higher   loperamide (IMODIUM) 2 mg capsule   No No   Sig: Take 1 capsule (2 mg total) by mouth 4 (four) times a day as needed for diarrhea (can have up to 8mg per day)   losartan (COZAAR) 100 MG tablet   No No   Sig: Take 1 tablet (100 mg total) by mouth daily   metoprolol tartrate (LOPRESSOR) 100 mg tablet   No No   Sig: Take 1 tablet (100 mg total) by mouth every 12 (twelve) hours   multivitamin-iron-minerals-folic acid (THERAPEUTIC-M) TABS tablet   No No   Sig: Take 1 tablet by mouth daily   predniSONE 1 mg tablet   No No   Sig: Take 4 tablets (4 mg total) by mouth daily   topiramate (TOPAMAX) 100 mg tablet   No No   Sig: Take 1 tablet (100 mg total) by mouth daily at bedtime      Facility-Administered Medications: None       Past Medical History:   Diagnosis Date   • Anemia    • Arthritis    • Benign hypertension     Procedure/Onset: 01/01/2005; Description: BP elevated today  Pt reports running out of BP meds 2wks ago  Will resume BP meds     • Diabetes mellitus ([facility] )    • Diabetes mellitus type 2 with complications, uncontrolled 9/8/2015   • Dyspnea on exertion    • Hyperlipidemia    • Hypertension    • Legally blind 2010   • Mild intermittent asthma with exacerbation 8/4/2018   • Miscarriage     x 3   • Polymyalgia rheumatica ([facility] [address] ) 11/24/2021   • Seizures ([facility] )    • Sickle cell trait (HCC)    • Sleep apnea    • Stage 3a chronic kidney disease ([facility] ) 5/19/2022   • Thyroid nodule 3/6/2020       Past Surgical History:   Procedure Laterality Date   • CATARACT EXTRACTION Bilateral     august and september   • EYE SURGERY     • HYSTERECTOMY      37   • OOPHORECTOMY Left     37   • NY LIGATION/BIOPSY TEMPORAL ARTERY Bilateral 10/17/2019    Procedure: BIOPSY ARTERY TEMPORAL;  Surgeon: Hiram Barrera DO;  Location: [facility];  Service: Vascular   • US GUIDED THYROID BIOPSY  5/13/2020       Family History   Problem Relation Age of Onset   • Heart attack Mother    • Heart disease Mother    • Hypertension Mother    • No Known Problems Father    • Heart disease Sister    • No Known Problems Brother    • No Known Problems Son    • No Known Problems Daughter    • Heart attack Maternal Grandmother    • Heart disease Maternal Grandmother    • Diabetes Other    • Heart attack Other    • No Known Problems Sister    • No Known Problems Sister    • No Known Problems Paternal Aunt    • No Known Problems Son    • Cancer Neg Hx    • Stroke Neg Hx      I have reviewed and agree with the history as documented  E-Cigarette/Vaping   • E-Cigarette Use Never User      E-Cigarette/Vaping Substances     Social History     Tobacco Use   • Smoking status: Never   • Smokeless tobacco: Never   Vaping Use   • Vaping Use: Never used   Substance Use Topics   • Alcohol use: Never     Alcohol/week: 0 0 standard drinks   • Drug use: No        Review of Systems   All other systems reviewed and are negative  Physical Exam  ED Triage Vitals [05/08/23 1118]   Temperature Pulse Respirations Blood Pressure SpO2   98 °F (36 7 °C) 83 18 154/71 100 %      Temp Source Heart Rate Source Patient Position - Orthostatic VS BP Location FiO2 (%)   Oral -- -- -- --      Pain Score       No Pain             Orthostatic Vital Signs  Vitals:    05/08/23 1118 05/08/23 1230 05/08/23 1345   BP: 154/71     Pulse: 83 84 82       Physical Exam  Vitals and nursing note reviewed  Constitutional:       General: She is not in acute distress  Appearance: She is well-developed  She is not ill-appearing  HENT:      Head: Normocephalic and atraumatic  Right Ear: External ear normal       Left Ear: External ear normal       Nose: Nose normal    Eyes:      Conjunctiva/sclera: Conjunctivae normal    Cardiovascular:      Rate and Rhythm: Normal rate  Pulmonary:      Effort: Pulmonary effort is normal  No respiratory distress  Abdominal:      General: There is no distension  Musculoskeletal:         General: No deformity  Cervical back: Neck supple  Right lower leg: Edema present  Left lower leg: Edema present  Skin:     General: Skin is warm and dry  Comments: 3 to 4 cm diameter open wound on left heel  No signs of infection  No purulent drainage, surrounding erythema  Neurological:      General: No focal deficit present  Mental Status: She is alert and oriented to person, place, and time           ED Medications  Medications - No data to display    Diagnostic Studies  Results Reviewed     Procedure Component Value Units Date/Time    Basic metabolic panel [[id number]]  (Abnormal) Collected: 05/08/23 1440    Lab Status: Final result Specimen: Blood from Arm, Left Updated: 05/08/23 1506     Sodium 143 mmol/L      Potassium 3 5 mmol/L      Chloride 115 mmol/L      CO2 26 mmol/L      ANION GAP 2 mmol/L      BUN 22 mg/dL      Creatinine 1 01 mg/dL      Glucose 156 mg/dL      Calcium 8 9 mg/dL      eGFR 58 ml/min/1 73sq m     Narrative:      [place] guidelines for Chronic Kidney Disease (CKD):   •  Stage 1 with normal or high GFR (GFR > 90 mL/min/1 73 square meters)  •  Stage 2 Mild CKD (GFR = 60-89 mL/min/1 73 square meters)  •  Stage 3A Moderate CKD (GFR = 45-59 mL/min/1 73 square meters)  •  Stage 3B Moderate CKD (GFR = 30-44 mL/min/1 73 square meters)  •  Stage 4 Severe CKD (GFR = 15-29 mL/min/1 73 square meters)  •  Stage 5 End Stage CKD (GFR <15 mL/min/1 73 square meters)  Note: GFR calculation is accurate only with a steady state creatinine    CBC and differential [[id number]]  (Abnormal) Collected: 05/08/23 1440    Lab Status: Final result Specimen: Blood from Arm, Left Updated: 05/08/23 1452     WBC 10 12 Thousand/uL      RBC 4 47 Million/uL      Hemoglobin 10 3 g/dL      Hematocrit 34 6 %      MCV 77 fL      MCH 23 0 pg      MCHC 29 8 g/dL      RDW 18 4 %      MPV 10 5 fL      Platelets 781 Thousands/uL      nRBC 0 /100 WBCs      Neutrophils Relative 65 %      Immat GRANS % 1 % Lymphocytes Relative 21 %      Monocytes Relative 8 %      Eosinophils Relative 4 %      Basophils Relative 1 %      Neutrophils Absolute 6 76 Thousands/µL      Immature Grans Absolute 0 06 Thousand/uL      Lymphocytes Absolute 2 12 Thousands/µL      Monocytes Absolute 0 76 Thousand/µL      Eosinophils Absolute 0 36 Thousand/µL      Basophils Absolute 0 06 Thousands/µL                  No orders to display         Procedures  Procedures      ED Course                                       Medical Decision Making  58 yo woman presenting with acute left diabetic heel wound  No signs of infection on exam   Discussed case with social work and family medicine, given patient's social situation, poor understanding of medication regimen, legal blindness, uncontrolled diabetes, homelessness, all parties believe that her wound warrants admission  I believe that if she is not admitted today, there is a high likelihood that the wound becomes more severe, possibly infected, leading to increasing morbidity and mortality to the patient  Ultimately admitted to family medicine  Open wound of left heel, initial encounter: acute illness or injury  Amount and/or Complexity of Data Reviewed  Independent Historian:      Details: Son at bedside assists in history  Labs: ordered  Risk  Decision regarding hospitalization  Diagnosis or treatment significantly limited by social determinants of health              Disposition  Final diagnoses:   Open wound of left heel, initial encounter   Uncontrolled type 2 diabetes mellitus with hyperglycemia ([facility] [facility] )   Homelessness     Time reflects when diagnosis was documented in both MDM as applicable and the Disposition within this note     Time User Action Codes Description Comment    5/8/2023  2:28 PM [address] [P22 [id number]] Open wound of left heel, initial encounter     5/8/2023  2:28 PM Shakira Reyes Add [E11 [id number]] Uncontrolled type 2 diabetes mellitus with hyperglycemia ([address] ) 5/8/2023  2:29 PM Marium Rome Add [Z59 [id number]] Homelessness       ED Disposition     ED Disposition   Admit    Condition   Stable    Date/Time   Mon May 8, 2023  2:27 PM    Comment   --         Follow-up Information    None         Patient's Medications   Discharge Prescriptions    No medications on file     No discharge procedures on file  PDMP Review       Value Time User    PDMP Reviewed  Yes 4/12/2023  1:16 AM Zeeshan Cabello PA-C           ED Provider  Attending physically available and evaluated Link Runner I managed the patient along with the ED Attending      Electronically Signed by         Zach Aragon MD  05/08/23 5629

## 2023-05-08 NOTE — ASSESSMENT & PLAN NOTE
Patient presented to the ED for open blisters of the feet bilaterally, which were draining  History of uncontrolled diabetes, neuropathy, homelessness which is affecting her care  Patient was recently discharged from rehab last week  On physical exam, blisters do not appear to be infected   Vitals and labs otherwise WNL    Plan:  · PT/OT referral - recommend STR  · Podiatry consulted - WBAT, wound care  · Stable for DC per podiatry  · CM referral  · Fall precautions

## 2023-05-08 NOTE — ASSESSMENT & PLAN NOTE
Stable on exam, does not appear infected  Also has history of uncontrolled DM and neuropathy  Local wound care  Podiatry placed wound care orders

## 2023-05-08 NOTE — H&P
H&P - Family Medicine Residency, Radha Jimenez 1960, 57 y o  female  MRN: [id number]    Unit/Bed#: -01 Encounter: [id number]  Primary Care Provider: Demetria Castillo MD      Admission Date: 5/8/2023 1114    Assessments & Plans:   Plans discussed with [facility] team and finalization is pending attending physician Dr Quinonez attestation  * Ambulatory dysfunction  Assessment & Plan  Patient presented to the ED for open blisters of the feet bilaterally, which were draining  History of uncontrolled diabetes, neuropathy, homelessness which is affecting her care  Patient was recently discharged from rehab last week  On physical exam, blisters do not appear to be infected  Vitals and labs otherwise WNL    Plan:  · PT/OT referral  · CM referral  · Fall precautions     Homelessness  Assessment & Plan  Socially complex history, patient and her son became homeless back in February and the patient has had multiple ED visits, admissions to both hospital and STRs  Last discharged from rehab this past week, has been homeless since  Affecting her access to care and medications  · CM referral placed  · Patient would maybe benefit from pill packs on discharge given her blindness    Uncontrolled type 2 diabetes mellitus with hyperglycemia ([facility] [address] )  Assessment & Plan  Lab Results   Component Value Date    HGBA1C 11 2 (H) 04/19/2023       No results for input(s): POCGLU in the last 72 hours      Blood Sugar Average: Last 72 hrs:     Uncontrolled DM with diabetic retinopathy (legally blind), CKD stage III  Home regimen: Lantus 25 units twice daily, Humalog 5 units 3 times daily with meals  Has not been on her diabetes medications in the last 24 hrs  · Will start at Lantus 10u qhs with SSI and reassess  · Glucose checks     Stage 3a chronic kidney disease [facility])  Assessment & Plan  Lab Results   Component Value Date    EGFR 58 05/08/2023    EGFR 64 04/16/2023    EGFR 68 04/14/2023    CREATININE 1 01 05/08/2023 CREATININE 0 94 04/16/2023    CREATININE 0 89 04/14/2023     Stable renal function  Will continue to monitor     Hypertension  Assessment & Plan  Home regimen: Amlodipine 5mg daily, Losartan 100mg daily, Lopressor 100mg q12hrs, Lasix 20mg BID, Hydralazine 10mg TID  Has not taken her BP meds in the past 24 hrs with BP on admission 154/71  · Will start with Losartan 50mg daily and uptitrate as needed    Friction blisters of the soles  Assessment & Plan  Stable on exam, does not appear infected  Also has history of uncontrolled DM and neuropathy  Will consult podiatry    Dyslipidemia  Assessment & Plan  Continue Lipitor 40mg daily    Diabetic neuropathy [facility])  Assessment & Plan  Lab Results   Component Value Date    HGBA1C 11 2 (H) 04/19/2023       Recent Labs     05/08/23  1708   POCGLU 178*       Blood Sugar Average: Last 72 hrs:  (P) 178     Continue PTA Gabapentin 100mg BID    PMR (polymyalgia rheumatica) ([facility])  Assessment & Plan  Home regimen: prednisone 4mg daily, continue  Will need to continue outpatient follow-up    Seizures ([facility] [address] )  Assessment & Plan  Hx of seizures and migraines  Continue PTA Topamax 100mg qhs      Patient Active Problem List   Diagnosis   • Uncontrolled type 2 diabetes mellitus with hyperglycemia ([facility])   • Seizures ([facility])   • Exertional dyspnea   • Enlarged pulmonary artery ([facility])   • Aortic dilatation ([facility])   • Anemia   • Decreased pulses in feet   • Hyperlipidemia   • Microalbuminuria   • Obstructive sleep apnea (adult) (pediatric)   • Class 3 severe obesity with serious comorbidity and body mass index (BMI) of 50 0 to 59 9 in adult [facility])   • Neuropathy of hand, right   • Prolonged QT interval   • Visual impairment in both eyes   • Vitamin D deficiency   • Lung nodules   • Orthostatic hypotension   • Mild intermittent asthma without complication   • Type 2 diabetes mellitus with microalbuminuria, with long-term current use of insulin ([facility])   • Frequent headaches   • Other inflammatory and immune myopathies, not elsewhere classified   • Transaminitis   • Morbid obesity ([facility])   • Polyneuropathy associated with underlying disease ([facility])   • PMR (polymyalgia rheumatica) ([facility])   • Thrombocytosis   • Left cavernous carotid aneurysm   • Type 2 diabetes mellitus with hyperglycemia, with long-term current use of insulin (HCC)   • Aneurysm (HCC)   • Thyroid nodule   • History of absence seizures   • Hypersomnia   • Migraine without aura and without status migrainosus, not intractable   • Cerebral aneurysm without rupture   • Chronic migraine without aura without status migrainosus, not intractable   • Type 2 diabetes mellitus with other specified complication ([facility])   • Hypertension   • Depressed mood   • Blurry vision, bilateral   • Ulnar neuropathy of right upper extremity   • Polymyalgia rheumatica ([facility])   • Gait instability   • Stage 3a chronic kidney disease ([facility])   • Chronic migraine without aura, not intractable, without status migrainosus   • Obstructive sleep apnea   • Diabetic neuropathy ([facility])   • Unsteadiness on feet   • Abnormal urinalysis   • Weakness   • Intertrigo   • Leg numbness   • Homelessness   • Bilateral lower extremity edema   • Diarrhea   • Dyslipidemia   • Insulin long-term use ([facility])   • Type 2 diabetes mellitus with hyperglycemia ([facility])   • Ambulatory dysfunction   • Legally blind   • Suspected pressure injury of deep tissue   • Calculus of gallbladder without cholecystitis without obstruction   • Sepsis ([facility] [address] )   • Diabetic foot ulcers ([facility])   • Abnormal CT scan   • Friction blisters of the soles       Diet: Diet Adria/CHO Controlled; Consistent Carbohydrate Diet Level 2 (5 carb servings/75 grams CHO/meal)  VTE Pharm PPX: Heparin  VTE Mech PPX: sequential compression device    Code Status:  Level 1 - Full Code      Disposition: Admit to Observation under Med-surg   Consult: IP CONSULT TO CASE MANAGEMENT  IP CONSULT TO PODIATRY    Chief Complaints:   Wound Check (Pt states she had blisters on left heel x a few weeks that opened up, son noticed the wound bleeding again and wanted her to come get it checked out  Pt has hx of diabetes, no fevers  )      History of Presenting Illness:     57 YOF presenting to the ED for open blisters of the left heel  PMH includes uncontrolled DM on insulin, diabetic retinopathy (legally blind), CKD, HTN, HLD, PMR, hx of seizures and migraines  Socially complex history - patient became homeless back in February and has been noted to have multiple ED visits/admissions to both hospital and rehab centers  Patient was recently discharged from her last rehab on Thursday and has been homeless since then  She has not been able to take her medications these past few days partly due to access to care/meds but also due to her blindness  She is not able to read her medication bottles and does not know which meds she is taking  Also has ambulatory dysfunction  ED management: Vitals and labs wnl  CM spoke with patient and unfortunately, she is a medical liability at the shelter and would not be accepted back       ED Management:     ED Triage Vitals [05/08/23 1118]   Temperature Pulse Respirations Blood Pressure SpO2   98 °F (36 7 °C) 83 18 154/71 100 %      Temp Source Heart Rate Source Patient Position - Orthostatic VS BP Location FiO2 (%)   Oral -- -- -- --      Pain Score       No Pain         Medications   atorvastatin (LIPITOR) tablet 40 mg (has no administration in time range)   budesonide-formoterol (SYMBICORT) 160-4 5 mcg/act inhaler 1 puff (has no administration in time range)   cholecalciferol (VITAMIN D3) tablet 2,000 Units (has no administration in time range)   docusate sodium (COLACE) capsule 100 mg (has no administration in time range)   gabapentin (NEURONTIN) capsule 100 mg (has no administration in time range)   losartan (COZAAR) tablet 50 mg (has no administration in time range)   predniSONE tablet 4 mg (has no administration in time range)   topiramate (TOPAMAX) tablet 100 mg (has no administration in time range)   heparin (porcine) subcutaneous injection 7,500 Units (has no administration in time range)   insulin glargine (LANTUS) subcutaneous injection 10 Units 0 1 mL (has no administration in time range)   insulin lispro (HumaLOG) 100 units/mL subcutaneous injection 2-12 Units (has no administration in time range)       Review of System:   Review of Systems   Constitutional: Positive for fatigue  Negative for chills and fever  HENT: Negative for ear pain and sore throat  Eyes: Negative for pain and visual disturbance  Respiratory: Negative for cough and shortness of breath  Cardiovascular: Negative for chest pain and palpitations  Gastrointestinal: Negative for abdominal pain and vomiting  Genitourinary: Negative for dysuria and hematuria  Musculoskeletal: Positive for gait problem  Negative for arthralgias and back pain  Skin: Positive for color change and wound  Negative for rash  Neurological: Positive for weakness  Negative for seizures and syncope  All other systems reviewed and are negative  History:     Past Medical History:   Diagnosis Date   • Anemia    • Arthritis    • Benign hypertension     Procedure/Onset: 01/01/2005; Description: BP elevated today  Pt reports running out of BP meds 2wks ago  Will resume BP meds     • Diabetes mellitus ([facility] )    • Diabetes mellitus type 2 with complications, uncontrolled 9/8/2015   • Dyspnea on exertion    • Hyperlipidemia    • Hypertension    • Legally blind 2010   • Mild intermittent asthma with exacerbation 8/4/2018   • Miscarriage     x 3   • Polymyalgia rheumatica ([facility] [address] ) 11/24/2021   • Seizures ([facility] [address] )    • Sickle cell trait ([facility] )    • Sleep apnea    • Stage 3a chronic kidney disease ([facility] [address] ) 5/19/2022   • Thyroid nodule 3/6/2020     Past Surgical History:   Procedure Laterality Date   • CATARACT EXTRACTION Bilateral     august and september   • EYE SURGERY     • HYSTERECTOMY      37   • OOPHORECTOMY Left     37   • HI LIGATION/BIOPSY TEMPORAL ARTERY Bilateral 10/17/2019    Procedure: BIOPSY ARTERY TEMPORAL;  Surgeon: Jean-Pierre Woodruff DO;  Location: [facility];  Service: Vascular   • US GUIDED THYROID BIOPSY  5/13/2020     Social History     Socioeconomic History   • Marital status: /Civil Union     Spouse name: None   • Number of children: None   • Years of education: None   • Highest education level: None   Occupational History   • Occupation: long term disability   Tobacco Use   • Smoking status: Never   • Smokeless tobacco: Never   Vaping Use   • Vaping Use: Never used   Substance and Sexual Activity   • Alcohol use: Never     Alcohol/week: 0 0 standard drinks   • Drug use: No   • Sexual activity: Not Currently     Partners: Male     Birth control/protection: None   Other Topics Concern   • None   Social History Narrative    Caffeine use    Resides with spouse and one son     Social Determinants of Health     Financial Resource Strain: Not on file   Food Insecurity: Food Insecurity Present   • Worried About [address] in the Last Year: Often true   • Ran Out of Food in the Last Year: Often true   Transportation Needs: Unmet Transportation Needs   • Lack of Transportation (Medical):  Yes   • Lack of Transportation (Non-Medical): Yes   Physical Activity: Not on file   Stress: Not on file   Social Connections: Not on file   Intimate Partner Violence: Not on file   Housing Stability: High Risk   • Unable to Pay for Housing in the Last Year: Yes   • Number of Places Lived in the Last Year: 3   • Unstable Housing in the Last Year: Yes     Family History   Problem Relation Age of Onset   • Heart attack Mother    • Heart disease Mother    • Hypertension Mother    • No Known Problems Father    • Heart disease Sister    • No Known Problems Brother    • No Known Problems Son    • No Known Problems Daughter    • Heart attack Maternal Grandmother    • Heart disease Maternal Grandmother    • Diabetes Other    • Heart attack Other    • No Known Problems Sister    • No Known Problems Sister    • No Known Problems Paternal Aunt    • No Known Problems Son    • Cancer Neg Hx    • Stroke Neg Hx        Medications & Allergies:   all medications and allergies reviewed  No Known Allergies    PTA Medications:  No current facility-administered medications on file prior to encounter  Current Outpatient Medications on File Prior to Encounter   Medication Sig Dispense Refill   • acetaminophen (TYLENOL) 325 mg tablet Take 650 mg by mouth every 6 (six) hours as needed for mild pain     • amLODIPine (NORVASC) 5 mg tablet Take 1 tablet (5 mg total) by mouth daily Do not start before April 19, 2023   0   • aspirin (ECOTRIN LOW STRENGTH) 81 mg EC tablet Take 1 tablet (81 mg total) by mouth daily 30 tablet 0   • atorvastatin (LIPITOR) 40 mg tablet Take 1 tablet (40 mg total) by mouth daily 30 tablet 0   • Blood Glucose Monitoring Suppl (OneTouch Verio) w/Device KIT Use 3 (three) times a day for 14 days 1 kit 0   • Blood Pressure Monitor KIT Check blood pressures BID 1 kit 0   • budesonide-formoterol (Symbicort) 160-4 5 mcg/act inhaler Inhale 1 puff 2 (two) times a day Rinse mouth after use  10 2 g 0   • cholecalciferol (VITAMIN D3) 1,000 units tablet Take 2 tablets (2,000 Units total) by mouth daily 60 tablet 0   • Continuous Blood Gluc  (FreeStyle Geoff Jacob 2 [place]) ERIC Use 1 each 4 (four) times a day 1 each 0   • Continuous Blood Gluc Sensor (FreeStyle Denisa 2 Sensor) MISC Use 1 each every 14 (fourteen) days 2 each 0   • docusate sodium (COLACE) 100 mg capsule Take 1 capsule (100 mg total) by mouth 2 (two) times a day 60 capsule 0   • Emollient (eucerin) lotion Apply 1 application   topically 2 (two) times a day = to B/L  mL 0   • furosemide (LASIX) 20 mg tablet Take 1 tablet (20 mg total) by mouth 2 (two) times a day 60 tablet 0   • gabapentin (NEURONTIN) 100 mg capsule Take 1 capsule (100 mg total) by mouth 2 (two) times a day  0   • hydrALAZINE (APRESOLINE) 10 mg tablet Take 1 tablet (10 mg total) by mouth 3 (three) times a day 90 tablet 0   • [] Insulin Glargine Solostar 100 UNIT/ML SOPN Inject 0 27 mL (27 Units total) under the skin in the morning AND 0 24 mL (24 Units total) daily at bedtime  = Lantus 27 units at 9AM/ Lantus 24 units @ 9PM)  15 3 mL 0   • insulin lispro (HumaLOG) 100 units/mL injection pen Inject 5 Units under the skin 3 (three) times a day with meals = also have SSI for  and higher 4 5 mL 0   • insulin lispro (HumaLOG) 100 units/mL injection Inject under the skin Sliding scale  Inject as per sliding scale:  if 0 - 150 = 0 unit;  151 - 200 = 2 units;  201 - 250 = 4 units;  251 - 300 = 6 units;  301 - 350 = 8 units;  351 - 400 = 10 units less than 70 or above 400 notify MD,  subcutaneously before meals and at bedtime for DM     • Lancets (OneTouch Delica Plus [id number]) MISC Use 1 each 3 (three) times a day 300 each 0   • loperamide (IMODIUM) 2 mg capsule Take 1 capsule (2 mg total) by mouth 4 (four) times a day as needed for diarrhea (can have up to 8mg per day) 30 capsule 0   • losartan (COZAAR) 100 MG tablet Take 1 tablet (100 mg total) by mouth daily 30 tablet 0   • metoprolol tartrate (LOPRESSOR) 100 mg tablet Take 1 tablet (100 mg total) by mouth every 12 (twelve) hours  0   • multivitamin-iron-minerals-folic acid (THERAPEUTIC-M) TABS tablet Take 1 tablet by mouth daily 30 tablet 0   • Nutritional Supplements (ProSource No Carb) LIQD Take 30 mL by mouth 2 (two) times a day     • Petrolatum-Zinc Oxide 57-17 % PSTE Apply 1 application   topically 3 (three) times a day To sacral area - protective     • [] predniSONE 1 mg tablet Take 4 tablets (4 mg total) by mouth daily 120 tablet 0   • topiramate (TOPAMAX) 100 mg tablet Take 1 tablet (100 mg total) by mouth daily at bedtime  0         Objective & Vitals:     Vitals: /71   Pulse 82   Temp 98 °F (36 7 °C) (Oral)   Resp 18 SpO2 99%     No intake or output data in the 24 hours ending 05/08/23 1725    Invasive Devices     Peripheral Intravenous Line  Duration           Peripheral IV 05/08/23 Left Antecubital <1 day                  Labs: I have personally reviewed pertinent reports  Recent Results (from the past 24 hour(s))   Basic metabolic panel    Collection Time: 05/08/23  2:40 PM   Result Value Ref Range    Sodium 143 135 - 147 mmol/L    Potassium 3 5 3 5 - 5 3 mmol/L    Chloride 115 (H) 96 - 108 mmol/L    CO2 26 21 - 32 mmol/L    ANION GAP 2 (L) 4 - 13 mmol/L    BUN 22 5 - 25 mg/dL    Creatinine 1 01 0 60 - 1 30 mg/dL    Glucose 156 (H) 65 - 140 mg/dL    Calcium 8 9 8 3 - 10 1 mg/dL    eGFR 58 ml/min/1 73sq m   CBC and differential    Collection Time: 05/08/23  2:40 PM   Result Value Ref Range    WBC 10 12 4 31 - 10 16 Thousand/uL    RBC 4 47 3 81 - 5 12 Million/uL    Hemoglobin 10 3 (L) 11 5 - 15 4 g/dL    Hematocrit 34 6 (L) 34 8 - 46 1 %    MCV 77 (L) 82 - 98 fL    MCH 23 0 (L) 26 8 - 34 3 pg    MCHC 29 8 (L) 31 4 - 37 4 g/dL    RDW 18 4 (H) 11 6 - 15 1 %    MPV 10 5 8 9 - 12 7 fL    Platelets 816 429 - 945 Thousands/uL    nRBC 0 /100 WBCs    Neutrophils Relative 65 43 - 75 %    Immat GRANS % 1 0 - 2 %    Lymphocytes Relative 21 14 - 44 %    Monocytes Relative 8 4 - 12 %    Eosinophils Relative 4 0 - 6 %    Basophils Relative 1 0 - 1 %    Neutrophils Absolute 6 76 1 85 - 7 62 Thousands/µL    Immature Grans Absolute 0 06 0 00 - 0 20 Thousand/uL    Lymphocytes Absolute 2 12 0 60 - 4 47 Thousands/µL    Monocytes Absolute 0 76 0 17 - 1 22 Thousand/µL    Eosinophils Absolute 0 36 0 00 - 0 61 Thousand/µL    Basophils Absolute 0 06 0 00 - 0 10 Thousands/µL   Fingerstick Glucose (POCT)    Collection Time: 05/08/23  5:08 PM   Result Value Ref Range    POC Glucose 178 (H) 65 - 140 mg/dl       Imaging:     I have personally reviewed pertinent reports  No results found      EKG, Pathology, and Other Studies:   I have personally "reviewed pertinent reports  Physical Exam:   Physical Exam  Vitals reviewed  Constitutional:       General: She is not in acute distress  HENT:      Head: Normocephalic and atraumatic  Nose: Nose normal    Eyes:      Extraocular Movements: Extraocular movements intact  Conjunctiva/sclera: Conjunctivae normal    Cardiovascular:      Rate and Rhythm: Normal rate and regular rhythm  Heart sounds: Normal heart sounds  No murmur heard  Pulmonary:      Effort: Pulmonary effort is normal  No respiratory distress  Breath sounds: Normal breath sounds  Abdominal:      General: Abdomen is flat  Palpations: Abdomen is soft  Tenderness: There is no abdominal tenderness  Musculoskeletal:         General: Normal range of motion  Cervical back: Normal range of motion  Skin:     Findings: Lesion present  Comments: Sole blisters bilaterally  Left blisters wrapped - no purulent drainage noted  Right blisters - dry, scabbed   Neurological:      Mental Status: She is alert and oriented to person, place, and time  Mental status is at baseline  Psychiatric:         Mood and Affect: Mood normal          Behavior: Behavior normal            Catherine Laureano MD  PGY-2, Family Medicine  05/08/23  5:25 PM    Dear reader, please be aware that portions of my note contain dictated text  I have done my best to proof-read this note prior to signing  However, there may be occasional unnoticed errors pertaining to \"sound-alike\" words and/or grammar during my dictation process  If there is any words or information that is unclear or appears erroneous, please kindly let me know and I will clarify and/or addend my notes accordingly  Thank you for your understanding      "

## 2023-05-08 NOTE — CASE MANAGEMENT
Case Management Assessment & Discharge Planning Note    Patient name Donavan Found  Location ED 24/ED 24 MRN [id number]  : 1960 Date 2023       Current Admission Date: 2023  Current Admission Diagnosis:  Patient Active Problem List    Diagnosis Date Noted   • Calculus of gallbladder without cholecystitis without obstruction 2023   • Sepsis ([place] [address] ) 2023   • Diabetic foot ulcers ([place] [address] ) 2023   • Abnormal CT scan 2023   • Suspected pressure injury of deep tissue 03/15/2023   • Dyslipidemia 2023   • Insulin long-term use ([place] [address] ) 2023   • Type 2 diabetes mellitus with hyperglycemia ([place] [address] ) 2023   • Ambulatory dysfunction 2023   • Legally blind 2023   • Diarrhea 2023   • Bilateral lower extremity edema 2023   • Homelessness 2023   • Abnormal urinalysis 2023   • Weakness 2023   • Intertrigo 2023   • Leg numbness 2023   • Unsteadiness on feet 2022   • Chronic migraine without aura, not intractable, without status migrainosus 2022   • Obstructive sleep apnea 2022   • Diabetic neuropathy ([place] [address] ) 2022   • Stage 3a chronic kidney disease ([place] [address] ) 2022   • Polymyalgia rheumatica ([place] [address] ) 2021   • Gait instability 2021   • Ulnar neuropathy of right upper extremity    • Blurry vision, bilateral 2021   • Depressed mood 10/08/2020   • Type 2 diabetes mellitus with other specified complication ([place] [address] )    • Hypertension 05/10/2020   • Chronic migraine without aura without status migrainosus, not intractable 2020   • Hypersomnia 2020   • Migraine without aura and without status migrainosus, not intractable 2020   • Cerebral aneurysm without rupture 2020   • History of absence seizures 2020   • Thyroid nodule 2020   • Aneurysm ([place] [address] ) 2020   • Type 2 diabetes mellitus with hyperglycemia, with long-term current use of insulin ([facility] ) 2020 • Left cavernous carotid aneurysm 02/10/2020   • Polyneuropathy associated with underlying disease ([facility] ) 01/07/2020   • PMR (polymyalgia rheumatica) ([facility] ) 01/07/2020   • Thrombocytosis 01/07/2020   • Morbid obesity ([facility] ) 09/19/2019   • Other inflammatory and immune myopathies, not elsewhere classified 09/16/2019   • Transaminitis 09/16/2019   • Frequent headaches 07/09/2019   • Type 2 diabetes mellitus with microalbuminuria, with long-term current use of insulin ([place] [address] ) 02/01/2019   • Mild intermittent asthma without complication 47/46/3190   • Orthostatic hypotension 06/25/2018   • Lung nodules 03/22/2018   • Exertional dyspnea 02/13/2018   • Enlarged pulmonary artery ([place] [address] ) 02/13/2018   • Aortic dilatation ([place] [address] ) 02/13/2018   • 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, with long-term current use of insulin    • Seizures ([place] [address] )    • Obstructive sleep apnea (adult) (pediatric) 12/18/2017   • Prolonged QT interval 12/18/2017   • Decreased pulses in feet 12/11/2017   • Microalbuminuria 09/08/2015   • Vitamin D deficiency 06/06/2014   • Visual impairment in both eyes 12/06/2012   • Anemia 03/20/2012   • Hyperlipidemia 03/20/2012   • Class 3 severe obesity with serious comorbidity and body mass index (BMI) of 50 0 to 59 9 in adult [facility]) 03/20/2012   • Neuropathy of hand, right 03/20/2012      LOS (days): 0  Geometric Mean LOS (GMLOS) (days):   Days to GMLOS:     OBJECTIVE:              Current admission status: Observation       Preferred Pharmacy:   [address] [facility], [facility] [place] [address]  Phone: [phone] Fax: [phone]    [facility] ([address] [facility],   [address] [address]  Phone: [phone] Fax: [phone]    Primary Care Provider: Brenton Kaufman MD    Primary Insurance: [facility] [address] [place] [address] [address]  Secondary Insurance:     ASSESSMENT:  [address], [address] Representative - Son   Primary Phone: [phone] (Mobile)  Home Phone: [phone]                         Readmission Root Cause  30 Day Readmission: No    Patient Information  Admitted from[de-identified] Other (comment) (pt is homeless)  Mental Status: Alert  During Assessment patient was accompanied by: Not accompanied during assessment  Assessment information provided by[de-identified] Patient  Primary Caregiver: Self  Support Systems: Self, [address] [facility] of Residence: [address] [place] [address] do you live in?: [address] [facility] entry access options   Select all that apply : Other access (Comment) (pt is homeless)  Type of Current Residence: Homeless  In the last 12 months, was there a time when you were not able to pay the mortgage or rent on time?: Yes  In the last 12 months, how many places have you lived?: 3  In the last 12 months, was there a time when you did not have a steady place to sleep or slept in a shelter (including now)?: Yes  Homeless/housing insecurity resource given?: Yes  Living Arrangements: Other (Comment) (currently homeless)  Is patient a ?: No    Activities of Daily Living Prior to Admission  Functional Status: Assistance  Completes ADLs independently?: Yes  Ambulates independently?: No  Level of ambulatory dependence: Assistance  Does patient use assisted devices?: Yes  Assisted Devices (DME) used: Jorge Alberto [place], [place] (Comment) (ortho boot)  Does patient currently own DME?: Yes  What DME does the patient currently own?: Other (Comment), Jorge Alberto [place] (ortho boot)  Does patient have a history of Outpatient Therapy (PT/OT)?: No  Does the patient have a history of Short-Term Rehab?: Yes ([facility] 2/2023, [facility] 4/2023)  Does patient have a history of HHC?: No  Does patient currently have [facility]?: No         Patient Information Continued  Income Source: SSI/SSD  Does patient have prescription coverage?: Yes  Within the past 12 months, you worried that your food would run out before you got the money to buy more : Often true  Within the past 12 months, the food you bought just didn't last and you didn't have money to get more : Often true  Food insecurity resource given?: Yes  Does patient receive dialysis treatments?: No  Does patient have a history of substance abuse?: No  Does patient have a history of Mental Health Diagnosis?: No         Means of Transportation  Means of Transport to Appts[de-identified] None  In the past 12 months, has lack of transportation kept you from medical appointments or from getting medications?: Yes  In the past 12 months, has lack of transportation kept you from meetings, work, or from getting things needed for daily living?: Yes  Was application for public transport provided?: Yes        DISCHARGE DETAILS:    Discharge planning discussed with[de-identified] bedside with pt        CM contacted family/caregiver?: No- see comments             Contacts  Patient Contacts: Silke Solomon  Relationship to Patient[de-identified] Family (son)  Contact Method: Phone  Phone Number: [phone]  Reason/Outcome: Emergency Contact              Other Referral/Resources/Interventions Provided:  Interventions: Food Bank, Shelter          Additional Comments: CM medt with pt beside  Pt lost her apartment last year and has been homeless ever since  Pt has a hx of STR, she stated she left [facility] of her roomate and was DC form [facility] for reaching her goals  Pt son has started to seperate himself from her  He is currently at the [employer]  Pt stated she had reached out to Adult services and they are not able to help her, she is unable to return to the shelter at [facility] due to her ongoing medical issues and vision, she is considered a liability  Pt stated her vision is getting worse and she is legally blind  She stated no one has checked her eyes in the past few visits   She is open to STR if reccomended

## 2023-05-08 NOTE — ASSESSMENT & PLAN NOTE
Lab Results   Component Value Date    EGFR 58 05/16/2023    EGFR 69 05/15/2023    EGFR 71 05/14/2023    CREATININE 1 01 05/16/2023    CREATININE 0 88 05/15/2023    CREATININE 0 86 05/14/2023     Stable renal function   Will continue to monitor

## 2023-05-08 NOTE — ASSESSMENT & PLAN NOTE
Last Blood Pressure: 526/12  Systolic (12GGV), IIE:321 , Min:123 , DERRICK:766   Diastolic (96RXU), WHH:10, Min:69, Max:80    Home regimen: Amlodipine 5mg daily, Losartan 100mg daily, Lopressor 100mg q12hrs, Lasix 20mg BID, Hydralazine 10mg TID  Was not taking her BP meds prior to admission due to multiple factors - homelessness, difficulty reading small print on pill bottles  · Continue Losartan 100mg daily   · Continue amlodipine 10mg daily  · Continue Lopressor 50mg q12hrs

## 2023-05-08 NOTE — ED ATTENDING ATTESTATION
5/8/2023   IBentley MD, saw and evaluated the patient  I have discussed the patient with the resident/non-physician practitioner and agree with the resident's/non-physician practitioner's findings, Plan of Care, and MDM as documented in the resident's/non-physician practitioner's note, except where noted  All available labs and Radiology studies were reviewed  I was present for key portions of any procedure(s) performed by the resident/non-physician practitioner and I was immediately available to provide assistance  At this point I agree with the current assessment done in the Emergency Department  I have conducted an independent evaluation of this patient a history and physical is as follows:    Unit/Bed#: ED 24 Encounter: [id number]    Chief Complaint   Patient presents with   • Wound Check     Pt states she had blisters on left heel x a few weeks that opened up, son noticed the wound bleeding again and wanted her to come get it checked out  Pt has hx of diabetes, no fevers  57year old female with PMH of DM, diabetic neuropathy, diabetic foot ulcers, CHF, HTN, HLD, currently undomiciled, presenting with left heel wound  Patient was recently admitted in our hospital with concern for gall bladder issues as well as with right foot wound and was discharged to short term rehab  She was discharged from the rehab and ended up back in a challenging social situation with no place to stay  She reports that her left heel developed a wound which is painful and is worse than when she was being discharged from short term rehab  No fevers or chills  No nausea or vomiting  Patient has pain due to the left heel wound but denies worsening pain, smell, drainage  She has difficulty performing wound cares, however her son (who is also, unfortunately, undomiciled) has been helping  Patient has a post-op shoe for her right foot and a special fitted heel-sparing shoe for her left foot   She is unable to follow up with podiatry or wound care on outpatient basis due to a challenging social situation  Patient reports that she has been trying to make healthy food choices  She has also been trying to find a place to stay but has, so far, been unsuccessful  Physical Exam  /71   Pulse 83   Temp 98 °F (36 7 °C) (Oral)   Resp 18   SpO2 100%      Vital signs and nursing notes reviewed    CONSTITUTIONAL: female appearing stated age resting in bed, in no acute distress  HEENT: atraumatic, normocephalic  Sclera anicteric, conjunctiva are not injected  Moist oral mucosa  CARDIOVASCULAR/CHEST: RRR, no M/R/G  2+ radial pulses  PULMONARY: Breathing comfortably on RA  Breath sounds are equal and clear to auscultation  ABDOMEN: non-distended  BS present, normoactive  Non-tender  MSK: moves all extremities, no deformities, no calf asymmetry  Right foot is without any wounds  There is 1+ pitting edema of RLE  Left foot is with a half-dollar sized full thickness but shallow heel ulcer without purulent drainage or surrounding erythema  There is 1+ pitting edema of LLE  2+ PT and DP pulses bilaterally  NEURO: Awake, alert, and oriented x 3  Face symmetric  Moves all extremities spontaneously  No focal neurologic deficits  SKIN: Warm, appears well-perfused  MENTAL STATUS: Normal affect, pleasant       Media Information  Document Information    Clinical Image - Mobile Device   Left heel wound   05/08/2023 13:41   Attached To:    Hospital Encounter on 5/8/23     Source Information    Bernard Ojeda MD  Be Ed         Labs and Imaging  Labs Reviewed   BASIC METABOLIC PANEL - Abnormal       Result Value Ref Range Status    Sodium 143  135 - 147 mmol/L Final    Potassium 3 5  3 5 - 5 3 mmol/L Final    Chloride 115 (*) 96 - 108 mmol/L Final    CO2 26  21 - 32 mmol/L Final    ANION GAP 2 (*) 4 - 13 mmol/L Final    BUN 22  5 - 25 mg/dL Final    Creatinine 1 01  0 60 - 1 30 mg/dL Final    Comment: Standardized to IDMS reference method    Glucose 156 (*) 65 - 140 mg/dL Final    Comment: Specimen collection should occur prior to Sulfasalazine administration due to the potential for falsely depressed results  Specimen collection should occur prior to Sulfapyridine administration due to the potential for falsely elevated results  If the patient is fasting, the ADA then defines impaired fasting glucose as > 100 mg/dL and diabetes as > or equal to 123 mg/dL      Calcium 8 9  8 3 - 10 1 mg/dL Final    eGFR 58  ml/min/1 73sq m Final    Narrative:     [place] guidelines for Chronic Kidney Disease (CKD):   •  Stage 1 with normal or high GFR (GFR > 90 mL/min/1 73 square meters)  •  Stage 2 Mild CKD (GFR = 60-89 mL/min/1 73 square meters)  •  Stage 3A Moderate CKD (GFR = 45-59 mL/min/1 73 square meters)  •  Stage 3B Moderate CKD (GFR = 30-44 mL/min/1 73 square meters)  •  Stage 4 Severe CKD (GFR = 15-29 mL/min/1 73 square meters)  •  Stage 5 End Stage CKD (GFR <15 mL/min/1 73 square meters)  Note: GFR calculation is accurate only with a steady state creatinine   CBC AND DIFFERENTIAL - Abnormal    WBC 10 12  4 31 - 10 16 Thousand/uL Final    RBC 4 47  3 81 - 5 12 Million/uL Final    Hemoglobin 10 3 (*) 11 5 - 15 4 g/dL Final    Hematocrit 34 6 (*) 34 8 - 46 1 % Final    MCV 77 (*) 82 - 98 fL Final    MCH 23 0 (*) 26 8 - 34 3 pg Final    MCHC 29 8 (*) 31 4 - 37 4 g/dL Final    RDW 18 4 (*) 11 6 - 15 1 % Final    MPV 10 5  8 9 - 12 7 fL Final    Platelets 518  298 - 390 Thousands/uL Final    nRBC 0  /100 WBCs Final    Neutrophils Relative 65  43 - 75 % Final    Immat GRANS % 1  0 - 2 % Final    Lymphocytes Relative 21  14 - 44 % Final    Monocytes Relative 8  4 - 12 % Final    Eosinophils Relative 4  0 - 6 % Final    Basophils Relative 1  0 - 1 % Final    Neutrophils Absolute 6 76  1 85 - 7 62 Thousands/µL Final    Immature Grans Absolute 0 06  0 00 - 0 20 Thousand/uL Final    Lymphocytes Absolute 2 12  0 60 - 4 47 Thousands/µL Final Monocytes Absolute 0 76  0 17 - 1 22 Thousand/µL Final    Eosinophils Absolute 0 36  0 00 - 0 61 Thousand/µL Final    Basophils Absolute 0 06  0 00 - 0 10 Thousands/µL Final       No orders to display           ED Course  Medications - No data to display     57year old female presenting with a newly developing left heel ulcer/wound in setting of DM and complicated by a challenging social situation  VS reviewed, afebrile and hypertensive  Clinically, low index of suspicion for infection such as cellulitis, osteomyelitis  I am, however, very concerned that patient has no safe place to stay and has a difficult time following up with wound care or podiatry on outpatient basis due to current homelessness  We will obtain labs and reach out to case management  For now, plan for admission to the hospital, as I anticipate this wound to worsen without proper wound care, potentially leading to sepsis, NST/gangrene and other complications   Patient is in agreement with being admitted to the hospital  Negative

## 2023-05-09 LAB
GLUCOSE SERPL-MCNC: 163 MG/DL (ref 65–140)
GLUCOSE SERPL-MCNC: 195 MG/DL (ref 65–140)
GLUCOSE SERPL-MCNC: 219 MG/DL (ref 65–140)
GLUCOSE SERPL-MCNC: 243 MG/DL (ref 65–140)

## 2023-05-09 RX ADMIN — BUDESONIDE AND FORMOTEROL FUMARATE DIHYDRATE 1 PUFF: 160; 4.5 AEROSOL RESPIRATORY (INHALATION) at 08:41

## 2023-05-09 RX ADMIN — TOPIRAMATE 100 MG: 100 TABLET, FILM COATED ORAL at 21:35

## 2023-05-09 RX ADMIN — PREDNISONE 4 MG: 1 TABLET ORAL at 08:39

## 2023-05-09 RX ADMIN — GABAPENTIN 100 MG: 100 CAPSULE ORAL at 08:39

## 2023-05-09 RX ADMIN — HEPARIN SODIUM 7500 UNITS: 5000 INJECTION INTRAVENOUS; SUBCUTANEOUS at 14:30

## 2023-05-09 RX ADMIN — INSULIN LISPRO 4 UNITS: 100 INJECTION, SOLUTION INTRAVENOUS; SUBCUTANEOUS at 17:45

## 2023-05-09 RX ADMIN — INSULIN LISPRO 2 UNITS: 100 INJECTION, SOLUTION INTRAVENOUS; SUBCUTANEOUS at 08:41

## 2023-05-09 RX ADMIN — HEPARIN SODIUM 7500 UNITS: 5000 INJECTION INTRAVENOUS; SUBCUTANEOUS at 21:34

## 2023-05-09 RX ADMIN — INSULIN GLARGINE 10 UNITS: 100 INJECTION, SOLUTION SUBCUTANEOUS at 21:35

## 2023-05-09 RX ADMIN — GABAPENTIN 100 MG: 100 CAPSULE ORAL at 17:43

## 2023-05-09 RX ADMIN — LOSARTAN POTASSIUM 50 MG: 50 TABLET, FILM COATED ORAL at 08:40

## 2023-05-09 RX ADMIN — DOCUSATE SODIUM 100 MG: 100 CAPSULE, LIQUID FILLED ORAL at 08:40

## 2023-05-09 RX ADMIN — Medication 2000 UNITS: at 08:40

## 2023-05-09 RX ADMIN — METFORMIN HYDROCHLORIDE 1000 MG: 500 TABLET ORAL at 17:45

## 2023-05-09 RX ADMIN — INSULIN LISPRO 2 UNITS: 100 INJECTION, SOLUTION INTRAVENOUS; SUBCUTANEOUS at 11:29

## 2023-05-09 RX ADMIN — DOCUSATE SODIUM 100 MG: 100 CAPSULE, LIQUID FILLED ORAL at 17:43

## 2023-05-09 RX ADMIN — HEPARIN SODIUM 7500 UNITS: 5000 INJECTION INTRAVENOUS; SUBCUTANEOUS at 05:47

## 2023-05-09 RX ADMIN — ATORVASTATIN CALCIUM 40 MG: 40 TABLET, FILM COATED ORAL at 08:39

## 2023-05-09 NOTE — OCCUPATIONAL THERAPY NOTE
Occupational Therapy Evaluation     Patient Name: Sindhu Cornejo  IGFAI'U Date: 5/9/2023  Problem List  Principal Problem:    Ambulatory dysfunction  Active Problems:    Uncontrolled type 2 diabetes mellitus with hyperglycemia (HCC)    Seizures (HCC)    PMR (polymyalgia rheumatica) (HCC)    Hypertension    Stage 3a chronic kidney disease (HCC)    Diabetic neuropathy (HCC)    Homelessness    Dyslipidemia    Friction blisters of the soles    Past Medical History  Past Medical History:   Diagnosis Date    Anemia     Arthritis     Benign hypertension     Procedure/Onset: 01/01/2005; Description: BP elevated today  Pt reports running out of BP meds 2wks ago  Will resume BP meds      Diabetes mellitus ([facility] )     Diabetes mellitus type 2 with complications, uncontrolled 9/8/2015    Dyspnea on exertion     Hyperlipidemia     Hypertension     Legally blind 2010    Mild intermittent asthma with exacerbation 8/4/2018    Miscarriage     x 3    Polymyalgia rheumatica ([facility] ) 11/24/2021    Seizures ([facility] )     Sickle cell trait ([facility])     Sleep apnea     Stage 3a chronic kidney disease ([facility] ) 5/19/2022    Thyroid nodule 3/6/2020     Past Surgical History  Past Surgical History:   Procedure Laterality Date    CATARACT EXTRACTION Bilateral     august and september    EYE SURGERY      HYSTERECTOMY      37    OOPHORECTOMY Left     37    IL LIGATION/BIOPSY TEMPORAL ARTERY Bilateral 10/17/2019    Procedure: BIOPSY ARTERY TEMPORAL;  Surgeon: Skye Moore DO;  [facility];  Service: Vascular    US GUIDED THYROID BIOPSY  5/13/2020 05/09/23 1216   OT Last Visit   OT Visit Date 05/09/23   Note Type   Note type Evaluation   Pain Assessment   Pain Assessment Tool FLACC   Pain Location/Orientation Location: Back   Pain Rating: FLACC (Rest) - Face 1   Pain Rating: FLACC (Rest) - Legs 0   Pain Rating: FLACC (Rest) - Activity 0   Pain Rating: FLACC (Rest) - Cry 1   Pain Rating: FLACC (Rest) - Consolability 0   Score: FLACC (Rest) 2   Pain Rating: FLACC (Activity) - Face 1   Pain Rating: FLACC (Activity) - Legs 0   Pain Rating: FLACC (Activity) - Activity 0   Pain Rating: FLACC (Activity) - Cry 1   Pain Rating: FLACC (Activity) - Consolability 0   Score: FLACC (Activity) 2   Restrictions/Precautions   Weight Bearing Precautions Per Order Yes   RLE Weight Bearing Per Order WBAT   LLE Weight Bearing Per Order WBAT  (per podiatry ok to maintain WBAT in heel offloading shoe)   Braces or Orthoses (S)  Other (Comment)  (heel offloading shoe LLE; surgical shoe RLE)   Other Precautions WBS; Multiple lines; Fall Risk;Pain   Home Living   Type of Home Homeless   Additional Comments Pt is homeless; son present in room; unclear if they are staying together  Reports will remain homeless upon D/C   Prior Function   Level of [place] Independent with ADLs; Independent with functional mobility   Lives With Alone   IADLs   (no car)   Falls in the last 6 months 0   Vocational Unemployed   Comments does not have a car to drive  Has RW and cane (both present at time of eval); reports using RW but states it is broken and was given to her from rehab  Lifestyle   Autonomy I w/ ADLS, Mod I w/ transfers and functional mobility PTA   Reciprocal Relationships Pt is homeless   Service to Others Unemployed; 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   [address] Will continue to assess   General   Family/Caregiver Present Yes   ADL   Eating Assistance 5  [address] Deficit Don/doff R shoe;Don/doff L shoe   Toileting Assistance  4  [phone] [facility] 4  Minimal Assistance   Functional Deficit Steadying;Supervision/safety;Verbal cueing; Increased time to complete   Bed Mobility   Supine to Sit 5 Supervision   Additional items HOB elevated; Increased time required;Verbal cues   Sit to Supine Unable to assess   Additional Comments Pt sat EOB w/ Fair sitting balance/trunk control   Transfers   Sit to Stand 5  Supervision   Additional items Increased time required;Verbal cues   Stand to Sit 5  Supervision   Additional items Increased time required;Verbal cues   Additional Comments use of RW   Functional Mobility   Functional Mobility 4  Minimal assistance   Additional Comments Pt ambulated short household distance w/ Min A x1; RW for support  Additional items Rolling walker   Balance   Static Sitting Fair +   Dynamic Sitting Fair   Static Standing Fair   Dynamic Standing Fair -   Ambulatory Poor +   Activity Tolerance   Activity Tolerance Patient limited by fatigue;Patient limited by pain   Medical Staff Made Aware Tyshawn JAFFE   Nurse Made Aware yes   RUE Assessment   RUE Assessment WFL   LUE Assessment   LUE Assessment WFL   Hand Function   Gross Motor Coordination Functional   Fine Motor Coordination Functional   Vision - Complex Assessment   Additional Comments reports very poor vision   Cognition   Overall Cognitive Status WFL   Arousal/Participation Responsive; Cooperative   Attention Within functional limits   Orientation Level Oriented X4   Memory Decreased recall of precautions   Following Commands Follows one step commands without difficulty   Comments Pt is pleasant and cooperative; needs occasional safety cues   Assessment   Limitation Decreased ADL status; Decreased Safe judgement during ADL;Decreased endurance;Decreased high-level ADLs; Decreased self-care trans   Prognosis Fair   Assessment Pt is a 58 y/o female seen for OT eval s/p adm to [facility] w/ open blisters on L heel  Pt is dx'd w/ ambulatory dysfunction    Pt  has a past medical history of Anemia, Arthritis, Benign hypertension, Diabetes mellitus ([address] ), Diabetes mellitus type 2 with complications, uncontrolled (9/8/2015), Dyspnea on exertion, Hyperlipidemia, Hypertension, Legally blind (), Mild intermittent asthma with exacerbation (2018), Miscarriage, Polymyalgia rheumatica ([facility] ) (2021), Seizures ([place] [id number] ), Sickle cell trait ([place] [address] ), Sleep apnea, Stage 3a chronic kidney disease ([place] [address] ) (2022), and Thyroid nodule (3/6/2020)  Pt with active OT orders and up with assistance  orders  Pt is homeless  Pt was I w/  ADLS; does not have a car; unemployed; has RW and cane; primarily uses RW  Pt is currently demonstrating the following occupational deficits: S UB ADLS, Min A LB ADLS, S bed mobility and transfers, Min A functional mobility w/ RW  These deficits that are impacting pt's baseline areas of occupation are a result of the following impairments: pain, endurance, activity tolerance, functional mobility, functional standing tolerance, unsupportive home environment and decreased I w/ ADLS/IADLS  The following Occupational Performance Areas to address include: bathing/shower, toilet hygiene, dressing, medication management, socialization, health maintenance, functional mobility, community mobility and clothing management  Recommend inpatient rehab  upon D/C  Pt to continue to benefit from acute immediate OT services to address the following goals 2-3x/week to  w/in 10-14 days:   Goals   Patient Goals to get a new walker   LTG Time Frame 10-14   Long Term Goal #1 see below listed goals   Plan   Treatment Interventions ADL retraining;Functional transfer training; Endurance training;Patient/family training;Equipment evaluation/education; Compensatory technique education;Continued evaluation; Energy conservation; Activityengagement   Goal Expiration Date 23   OT Frequency 2-3x/wk   Recommendation   OT Discharge Recommendation Post acute rehabilitation services   Additional Comments  The patient's raw score on the AM-PAC Daily Activity Inpatient Short Form is 21   A raw score of greater than or equal to 19 suggests the patient may benefit from discharge to home  Please refer to the recommendation of the Occupational Therapist for safe discharge planning   Additional Comments 2 Pt seen as a co-session due to the patient's co-morbidities, clinically unstable presentation, and present impairments which are a regression from the patient's baseline  AM-PAC Daily Activity Inpatient   Lower Body Dressing 3   Bathing 3   Toileting 3   Upper Body Dressing 4   Grooming 4   Eating 4   Daily Activity Raw Score 21   Daily Activity Standardized Score (Calc for Raw Score >=11) 44 27   AM-PAC Applied Cognition Inpatient   Following a Speech/Presentation 4   Understanding Ordinary Conversation 4   Taking Medications 4   Remembering Where Things Are Placed or Put Away 4   Remembering List of 4-5 Errands 4   Taking Care of Complicated Tasks 3   Applied Cognition Raw Score 23   Applied Cognition Standardized Score 53 08   End of Consult   Education Provided Yes;Family or social support of family present for education by provider   Patient Position at End of Consult Bedside chair; All needs within reach   Nurse Communication Nurse aware of consult     GOALS    1) Pt will improve activity tolerance to G for 30 min txment sessions for increase engagement in functional tasks  2) Pt will complete UB/LB dressing/self care w/ mod I using adaptive device and DME as needed  3) Pt will complete bathing w/ Mod I w/ use of AE and DME as needed  4) Pt will complete toileting w/ mod I w/ G hygiene/thoroughness using DME as needed  5) Pt will improve functional transfers to Mod I on/off all surfaces using DME as needed w/ G balance/safety   6) Pt will improve functional mobility during ADL/IADL/leisure tasks to Mod I using DME as needed w/ G balance/safety   7) Pt will participate in simulated IADL management task to increase independence to Mod I w/ G safety and endurance  8) Pt will be attentive 100% of the time during ongoing cognitive assessment w/ G participation to assist w/ safe d/c planning/recommendations  9) Pt will demonstrate G carryover of pt/caregiver education and training as appropriate w/o cues w/ good tolerance to increase safety during functional tasks  10) Pt will demonstrate 100% carryover of energy conservation techniques t/o functional I/ADL/leisure tasks w/o cues s/p skilled education to increase endurance during functional tasks     Cyndy Villanueva MS, OTR/L

## 2023-05-09 NOTE — PLAN OF CARE
Problem: PHYSICAL THERAPY ADULT  Goal: Performs mobility at highest level of function for planned discharge setting  See evaluation for individualized goals  Description: Treatment/Interventions: Functional transfer training, LE strengthening/ROM, Therapeutic exercise, Endurance training, Patient/family training, Equipment eval/education, Gait training, Bed mobility, Spoke to nursing          See flowsheet documentation for full assessment, interventions and recommendations  Note: Prognosis: Good  Problem List: Decreased strength, Impaired balance, Decreased range of motion, Decreased endurance, Decreased mobility, Pain  Assessment: Pt seen for high complexity PT evaluation due to decrease in functional mobility status compared to baseline  Pt with active PT eval/treat orders at this time  Pt is a 57 y o  F who presented to [facility] with ambulatory dysfunction on 5/8/23  Pt  has a past medical history of Anemia, Arthritis, Benign hypertension, Diabetes mellitus ([facility] ), Diabetes mellitus type 2 with complications, uncontrolled (9/8/2015), Dyspnea on exertion, Hyperlipidemia, Hypertension, Legally blind (2010), Mild intermittent asthma with exacerbation (8/4/2018), Miscarriage, Polymyalgia rheumatica ([facility] ) (11/24/2021), Seizures ([facility] ), Sickle cell trait ([facility] ), Sleep apnea, Stage 3a chronic kidney disease ([place] [address] ) (5/19/2022), and Thyroid nodule (3/6/2020)  Pt is currently homeless  Pt presents with decreased strength, balance, endurance that contribute to limitations in bed mobility, functional transfers, functional mobility  Pt requires supervision for supine>sit and STS, and Min A for ambulation at this time  Pt left upright in bedside chair with all needs in reach  Pt will benefit from skilled therapy in order to address current impairments and functional limitations  PT to follow pt and recommending rehab once medically cleared    The patient's AM-PAC Basic Mobility Inpatient Short Form Raw Score is 16  A Raw score of less than or equal to 16 suggests the patient may benefit from discharge to post-acute rehabilitation services  Please also refer to the recommendation of the Physical Therapist for safe discharge planning  Barriers to Discharge: Inaccessible home environment, Decreased caregiver support  Barriers to Discharge Comments: T+14  PT Discharge Recommendation: Post acute rehabilitation services    See flowsheet documentation for full assessment

## 2023-05-09 NOTE — PHYSICAL THERAPY NOTE
Physical Therapy Evaluation     Patient's Name: Radha Bedolla    Admitting Diagnosis  Homelessness [Z59 00]  Open wound of left heel, initial encounter [S91 302A]  Uncontrolled type 2 diabetes mellitus with hyperglycemia ([facility] ) [E11 [id number]]    Problem List  Patient Active Problem List   Diagnosis    Uncontrolled type 2 diabetes mellitus with hyperglycemia (HCC)    Seizures (HCC)    Exertional dyspnea    Enlarged pulmonary artery (HCC)    Aortic dilatation (HCC)    Anemia    Decreased pulses in feet    Hyperlipidemia    Microalbuminuria    Obstructive sleep apnea (adult) (pediatric)    Class 3 severe obesity with serious comorbidity and body mass index (BMI) of 50 0 to 59 9 in adult [facility])    Neuropathy of hand, right    Prolonged QT interval    Visual impairment in both eyes    Vitamin D deficiency    Lung nodules    Orthostatic hypotension    Mild intermittent asthma without complication    Type 2 diabetes mellitus with microalbuminuria, with long-term current use of insulin (HCC)    Frequent headaches    Other inflammatory and immune myopathies, not elsewhere classified    Transaminitis    Morbid obesity (HCC)    Polyneuropathy associated with underlying disease ([facility] )    PMR (polymyalgia rheumatica) (HCC)    Thrombocytosis    Left cavernous carotid aneurysm    Type 2 diabetes mellitus with hyperglycemia, with long-term current use of insulin (HCC)    Aneurysm (HCC)    Thyroid nodule    History of absence seizures    Hypersomnia    Migraine without aura and without status migrainosus, not intractable    Cerebral aneurysm without rupture    Chronic migraine without aura without status migrainosus, not intractable    Type 2 diabetes mellitus with other specified complication (HCC)    Hypertension    Depressed mood    Blurry vision, bilateral    Ulnar neuropathy of right upper extremity    Polymyalgia rheumatica (HCC)    Gait instability    Stage 3a chronic kidney disease (HCC)    Chronic migraine without aura, not intractable, without status migrainosus    Obstructive sleep apnea    Diabetic neuropathy (HCC)    Unsteadiness on feet    Abnormal urinalysis    Weakness    Intertrigo    Leg numbness    Homelessness    Bilateral lower extremity edema    Diarrhea    Dyslipidemia    Insulin long-term use (HCC)    Type 2 diabetes mellitus with hyperglycemia ([facility])    Ambulatory dysfunction    Legally blind    Suspected pressure injury of deep tissue    Calculus of gallbladder without cholecystitis without obstruction    Sepsis ([facility] )    Diabetic foot ulcers ([facility])    Abnormal CT scan    Friction blisters of the soles       Past Medical History  Past Medical History:   Diagnosis Date    Anemia     Arthritis     Benign hypertension     Procedure/Onset: 01/01/2005; Description: BP elevated today  Pt reports running out of BP meds 2wks ago  Will resume BP meds      Diabetes mellitus ([facility] )     Diabetes mellitus type 2 with complications, uncontrolled 9/8/2015    Dyspnea on exertion     Hyperlipidemia     Hypertension     Legally blind 2010    Mild intermittent asthma with exacerbation 8/4/2018    Miscarriage     x 3    Polymyalgia rheumatica ([facility] ) 11/24/2021    Seizures ([facility] )     Sickle cell trait ([facility])     Sleep apnea     Stage 3a chronic kidney disease ([facility] ) 5/19/2022    Thyroid nodule 3/6/2020       Past Surgical History  Past Surgical History:   Procedure Laterality Date    CATARACT EXTRACTION Bilateral     august and september    EYE SURGERY      HYSTERECTOMY      37    OOPHORECTOMY Left     37    UT LIGATION/BIOPSY TEMPORAL ARTERY Bilateral 10/17/2019    Procedure: BIOPSY ARTERY TEMPORAL;  Surgeon: Mikie Aquino DO;  [facility];  Service: Vascular    US GUIDED THYROID BIOPSY  5/13/2020 05/09/23 1219   PT Last Visit   PT Visit Date 05/09/23   Note Type   Note type Evaluation   Pain Assessment   Pain Assessment Tool FLACC   Pain Location/Orientation Location: Back   Pain Rating: FLACC (Rest) - Face 1   Pain Rating: FLACC (Rest) - Legs 0   Pain Rating: FLACC (Rest) - Activity 0   Pain Rating: FLACC (Rest) - Cry 1   Pain Rating: FLACC (Rest) - Consolability 0   Score: FLACC (Rest) 2   Pain Rating: FLACC (Activity) - Face 1   Pain Rating: FLACC (Activity) - Legs 0   Pain Rating: FLACC (Activity) - Activity 0   Pain Rating: FLACC (Activity) - Cry 1   Pain Rating: FLACC (Activity) - Consolability 0   Score: FLACC (Activity) 2   Restrictions/Precautions   Weight Bearing Precautions Per Order Yes   RLE Weight Bearing Per Order WBAT   LLE Weight Bearing Per Order (S)  WBAT  (in heel off loading per TT with podiatry)   Braces or Orthoses   (heel offloading L LE, sx shoe R LE)   Other Precautions WBS; Multiple lines; Fall Risk;Pain   Home Living   Type of [address] Walker;Cane  (reports use of RW PTA)   Additional Comments Pt reports she has no place to go upon DC, was homeless on admission  PT reports RW was given to her at rehab but it is broken  RW has many bags of pt's belongings attached to it, unclear if walker is actually broken or heavy and difficult to manuver 2/2 bags  Prior Function   Level of [place] Independent with ADLs; Independent with functional mobility   Lives With Alone   IADLs   (no car)   Falls in the last 6 months 0   Vocational Unemployed   General   Family/Caregiver Present Yes  (son)   Cognition   Overall Cognitive Status WFL   Attention Within functional limits   Memory Decreased recall of precautions   Following Commands Follows one step commands without difficulty   Subjective   Subjective Pleasant and agreeable to particiapte  RLE Assessment   RLE Assessment   (functionally 3/5)   LLE Assessment   LLE Assessment   (functionally 3/5)   Bed Mobility   Supine to Sit 5  Supervision   Additional items HOB elevated; Increased time required;Verbal cues   Additional Comments Left OOB in chair with all needs in reach   Transfers   Sit to Stand 5  Supervision   Additional items Increased time required;Verbal cues   Stand to Sit 5  Supervision   Additional items Increased time required;Verbal cues   Additional Comments with RW   Ambulation/Elevation   Gait pattern Excessively slow; Short stride; Foward flexed;Decreased foot clearance   Gait Assistance 4  Minimal assist   Additional items Assist x 1; Tactile cues; Verbal cues   Assistive Device Rolling walker   Distance 45 ft x 2   Balance   Static Sitting Fair +   Dynamic Sitting Fair   Static Standing Fair   Dynamic Standing Fair -   Ambulatory Poor +   Activity Tolerance   Activity Tolerance Patient limited by pain; Patient limited by fatigue   Medical Staff Made Aware JAMAL Saravia   Nurse Made Aware RN cleared pt to be seen by PT   Assessment   Prognosis Good   Problem List Decreased strength; Impaired balance;Decreased range of motion;Decreased endurance;Decreased mobility;Pain   Assessment Pt seen for high complexity PT evaluation due to decrease in functional mobility status compared to baseline  Pt with active PT eval/treat orders at this time  Pt is a 57 y o  F who presented to [address] [place] [address] with ambulatory dysfunction on 5/8/23  Pt  has a past medical history of Anemia, Arthritis, Benign hypertension, Diabetes mellitus ([facility] [address] Diabetes mellitus type 2 with complications, uncontrolled (9/8/2015), Dyspnea on exertion, Hyperlipidemia, Hypertension, Legally blind (2010), Mild intermittent asthma with exacerbation (8/4/2018), Miscarriage, Polymyalgia rheumatica ([facility] ) (11/24/2021), Seizures ([facility] ), Sickle cell trait ([facility] ), Sleep apnea, Stage 3a chronic kidney disease ([facility] ) (5/19/2022), and Thyroid nodule (3/6/2020)  Pt is currently homeless  Pt presents with decreased strength, balance, endurance that contribute to limitations in bed mobility, functional transfers, functional mobility  Pt requires supervision for supine>sit and STS, and Min A for ambulation at this time  Pt left upright in bedside chair with all needs in reach    Pt will benefit from skilled therapy in order to address current impairments and functional limitations  PT to follow pt and recommending rehab once medically cleared  The patient's AM-[facility] Basic Mobility Inpatient Short Form Raw Score is 16  A Raw score of less than or equal to 16 suggests the patient may benefit from discharge to post-acute rehabilitation services  Please also refer to the recommendation of the Physical Therapist for safe discharge planning  Barriers to Discharge Inaccessible home environment;Decreased caregiver support   Barriers to Discharge Comments T+14   Goals   Patient Goals to get a new RW   STG Expiration Date 05/23/23   Short Term Goal #1 1  Pt will demonstrate ability to perform all aspects of bed mobility with Mod I in order to increase independence and decrease burden on caregivers  2  Pt will demonstrate ability to perform functional transfers with Mod I in order to increase independence and decrease burden on caregivers  3  Pt will demonstrate ability to ambulate 200 ft with least restrictive AD with Mod I in order to return to mobility safely  4  Pt will demonstrate ability to negotiate full flight steps with/without HR and Mod I in order to return to household/community mobility safely  5  Pt will demonstrate improved balance by one grade order to decrease risk of falls  6  Pt will increase b/l LE strength by 1 grade in order to increase ease of functional mobility and transfers  Plan   Treatment/Interventions Functional transfer training;LE strengthening/ROM; Therapeutic exercise; Endurance training;Patient/family training;Equipment eval/education;Gait training;Bed mobility;Spoke to nursing   PT Frequency 2-3x/wk   Recommendation   PT Discharge Recommendation Post acute rehabilitation services   -[facility] Basic Mobility Inpatient   Turning in Flat Bed Without Bedrails 3   Lying on Back to Sitting on Edge of Flat Bed Without Bedrails 3   Moving Bed to Chair 3   Standing Up From Chair Using Arms 3 Walk in Room 3   Climb 3-5 Stairs With Railing 1   Basic Mobility Inpatient Raw Score 16   Basic Mobility Standardized Score 38 32   Highest Level Of Mobility   -[facility] Goal 5: Stand one or more mins   -[facility] Achieved 7: Walk 25 feet or more   Modified [place] Scale   Modified [place] Scale 4         Sherry Guardado, PT, DPT

## 2023-05-09 NOTE — PLAN OF CARE
Problem: OCCUPATIONAL THERAPY ADULT  Goal: Performs self-care activities at highest level of function for planned discharge setting  See evaluation for individualized goals  Description: Treatment Interventions: ADL retraining, Functional transfer training, Endurance training, Patient/family training, Equipment evaluation/education, Compensatory technique education, Continued evaluation, Energy conservation, Activityengagement          See flowsheet documentation for full assessment, interventions and recommendations  Note: Limitation: Decreased ADL status, Decreased Safe judgement during ADL, Decreased endurance, Decreased high-level ADLs, Decreased self-care trans  Prognosis: Fair  Assessment: Pt is a 56 y/o female seen for OT eval s/p adm to [facility] w/ open blisters on L heel  Pt is dx'd w/ ambulatory dysfunction  Pt  has a past medical history of Anemia, Arthritis, Benign hypertension, Diabetes mellitus ([place] [address] ), Diabetes mellitus type 2 with complications, uncontrolled (9/8/2015), Dyspnea on exertion, Hyperlipidemia, Hypertension, Legally blind (2010), Mild intermittent asthma with exacerbation (8/4/2018), Miscarriage, Polymyalgia rheumatica ([place] [address] ) (11/24/2021), Seizures ([place] [address] ), Sickle cell trait ([place] [address] ), Sleep apnea, Stage 3a chronic kidney disease ([place] [address] ) (5/19/2022), and Thyroid nodule (3/6/2020)  Pt with active OT orders and up with assistance  orders  Pt is homeless  Pt was I w/  ADLS; does not have a car; unemployed; has RW and cane; primarily uses RW  Pt is currently demonstrating the following occupational deficits: S UB ADLS, Min A LB ADLS, S bed mobility and transfers, Min A functional mobility w/ RW  These deficits that are impacting pt's baseline areas of occupation are a result of the following impairments: pain, endurance, activity tolerance, functional mobility, functional standing tolerance, unsupportive home environment and decreased I w/ ADLS/IADLS  The following Occupational Performance Areas to address include: bathing/shower, toilet hygiene, dressing, medication management, socialization, health maintenance, functional mobility, community mobility and clothing management  Recommend inpatient rehab  upon D/C   Pt to continue to benefit from acute immediate OT services to address the following goals 2-3x/week to  w/in 10-14 days:     OT Discharge Recommendation: Post acute rehabilitation services     Sheri Arevalo MS, OTR/L

## 2023-05-09 NOTE — UTILIZATION REVIEW
Initial Clinical Review    Observation 5/8 @ 1538 and changed to Inpatient on 5/9 @ 1445  Pt requiring continued stay for Draining open blisters and Ambulatory Dysfunction/ Workup/ Safe d/c plan    Admission: Date/Time/Statement:   Admission Orders (From admission, onward)     Ordered        05/09/23 1445  Inpatient Admission  Once            05/08/23 1538  Place in Observation  Once                      Orders Placed This Encounter   Procedures   • Inpatient Admission     Standing Status:   Standing     Number of Occurrences:   1     Order Specific Question:   Level of Care     Answer:   Med Surg [16]     Order Specific Question:   Estimated length of stay     Answer:   More than 2 Midnights     Order Specific Question:   Certification     Answer:   I certify that inpatient services are medically necessary for this patient for a duration of greater than two midnights  See H&P and MD Progress Notes for additional information about the patient's course of treatment  ED Arrival Information     Expected   -    Arrival   5/8/2023 11:11    Acuity   Urgent            Means of arrival   Ambulance    Escorted by   [facility]    Admission type   Emergency            Arrival complaint   left leg wound           Chief Complaint   Patient presents with   • Wound Check     Pt states she had blisters on left heel x a few weeks that opened up, son noticed the wound bleeding again and wanted her to come get it checked out  Pt has hx of diabetes, no fevers  Initial Presentation: 57 y o  female to ED presents for Wound check, Friction blisters of the soles  Recently discharge from her last rehab on Thursday and has been homeless since then  She has not been able to take her medications these past few days partly due to access to care/meds but also due to her blindness  Pt unable to reach her medication bottles and does not know which medications she is taking  Ambulatory dysfunction  Pt is a medical liability at the shelter and would not be accepted back  PMH for uncontrolled DM on insulin, diabetic retinopathy (legally blind), CKD, HTN, HLD, PMR, hx of seizures and migraines  Socially complex history - patient became homeless back in February and has been noted to have multiple ED visits/admissions to both hospital and rehab centers  Admit Observation level of care for Ambulatory dysfunction, Uncontrolled Diabetes, Homelessness  Start at Lantus and Glucose checks  Podiatry consult  5/9 Changed to Inpatient status  Progress notes; Open blisters of the feet bilaterally, which were draining  Uncontrolled DM with diabetic retinopathy (legally blind  Continue Lantus  Add Metformin 1000 mg Bid to optimize glucose control and given patient's stable renal status  /71 on admit  Pt has not taken her BP med in past 24hrs  Continue Losartan 50 mg daily and uptitrate as needed  Local wound care  Podiatry consult  Podiatry cons; Diabetic heel ulcer  Plan for local wound care consisting of Kaleigh Zapata DSD to stable left heel diabetic ulceration  No acute clinical signs of infection present at this time  LEADs reviewed: RLE- 1 08/142/10  LLE- 1 06/>254/95, within healing range       ED Triage Vitals [05/08/23 1118]   Temperature Pulse Respirations Blood Pressure SpO2   98 °F (36 7 °C) 83 18 154/71 100 %      Temp Source Heart Rate Source Patient Position - Orthostatic VS BP Location FiO2 (%)   Oral -- -- -- --      Pain Score       No Pain          Wt Readings from Last 1 Encounters:   04/12/23 136 kg (299 lb 13 2 oz)     Additional Vital Signs:   05/09/23 08:18:52 -- 89 -- 154/89 111 95 % --   05/08/23 22:22:13 98 1 °F (36 7 °C) 82 20 155/76 102 95 % --   05/08/23 1345 -- 82 -- -- -- 99 % None (Room air)   05/08/23 1230 -- 84 -- -- -- 97 % None (Room air)     Pertinent Labs/Diagnostic Test Results:   No orders to display         Results from last 7 days   Lab Units 05/08/23  1440   WBC Thousand/uL 10 12   HEMOGLOBIN g/dL 10 3*   HEMATOCRIT % 34 6*   PLATELETS Thousands/uL 323   NEUTROS ABS Thousands/µL 6 76         Results from last 7 days   Lab Units 05/08/23  1440   SODIUM mmol/L 143   POTASSIUM mmol/L 3 5   CHLORIDE mmol/L 115*   CO2 mmol/L 26   ANION GAP mmol/L 2*   BUN mg/dL 22   CREATININE mg/dL 1 01   EGFR ml/min/1 73sq m 58   CALCIUM mg/dL 8 9         Results from last 7 days   Lab Units 05/09/23  1557 05/09/23  1118 05/09/23  0632 05/08/23  2146 05/08/23  1708   POC GLUCOSE mg/dl 219* 195* 163* 152* 178*     Results from last 7 days   Lab Units 05/08/23  1440   GLUCOSE RANDOM mg/dL 156*             BETA-HYDROXYBUTYRATE   Date Value Ref Range Status   02/28/2023 1 4 (H) <0 6 mmol/L Final   02/09/2023 0 2 <0 6 mmol/L Final   03/05/2020 0 1 <0 6 mmol/L Final        ED Treatment:   Medication Administration from 05/08/2023 1111 to 05/08/2023 1647     None        Past Medical History:   Diagnosis Date   • Anemia    • Arthritis    • Benign hypertension     Procedure/Onset: 01/01/2005; Description: BP elevated today  Pt reports running out of BP meds 2wks ago  Will resume BP meds     • Diabetes mellitus ([facility] )    • Diabetes mellitus type 2 with complications, uncontrolled 9/8/2015   • Dyspnea on exertion    • Hyperlipidemia    • Hypertension    • Legally blind 2010   • Mild intermittent asthma with exacerbation 8/4/2018   • Miscarriage     x 3   • Polymyalgia rheumatica ([facility] ) 11/24/2021   • Seizures ([facility] )    • Sickle cell trait ([facility] )    • Sleep apnea    • Stage 3a chronic kidney disease ([facility] ) 5/19/2022   • Thyroid nodule 3/6/2020     Present on Admission:  • Ambulatory dysfunction  • Diabetic neuropathy ([facility] [address] )  • Dyslipidemia  • PMR (polymyalgia rheumatica) (HCC)  • Seizures ([facility])  • Uncontrolled type 2 diabetes mellitus with hyperglycemia ([facility])  • Stage 3a chronic kidney disease ([facility] )  • Hypertension      Admitting Diagnosis: Homelessness [Z59 00]  Open wound of left heel, initial encounter [S91 302A]  Uncontrolled type 2 diabetes mellitus with hyperglycemia (HCC) [E11 65]  Age/Sex: 57 y o  female     Admission Orders:  Scheduled Medications:  atorvastatin, 40 mg, Oral, Daily  budesonide-formoterol, 1 puff, Inhalation, BID  cholecalciferol, 2,000 Units, Oral, Daily  docusate sodium, 100 mg, Oral, BID  gabapentin, 100 mg, Oral, BID  heparin (porcine), 7,500 Units, Subcutaneous, Q8H RONI  insulin glargine, 10 Units, Subcutaneous, HS  insulin lispro, 2-12 Units, Subcutaneous, TID AC  losartan, 50 mg, Oral, Daily  metFORMIN, 1,000 mg, Oral, BID With Meals  predniSONE, 4 mg, Oral, Daily  topiramate, 100 mg, Oral, HS      Continuous IV Infusions: None     PRN Meds: None       IP CONSULT TO CASE MANAGEMENT  IP CONSULT TO PODIATRY    Network Utilization Review Department  ATTENTION: Please call with any questions or concerns to [phone] and carefully listen to the prompts so that you are directed to the right person  All voicemails are confidential   Gustavo Alvarez all requests for admission clinical reviews, approved or denied determinations and any other requests to dedicated fax number below belonging to the campus where the patient is receiving treatment   List of dedicated fax numbers for the Facilities:  [address] [place] [address] DENIALS (Administrative/Medical Necessity) [phone]   [address] (Maternity/NICU/Pediatrics) [phone]   [address] [phone]   [facility] [phone]   [place] [phone]   [facility] [address] [place]   [address] [place] [address] [phone]   [facility] [address] [place] [address] [phone]

## 2023-05-09 NOTE — PLAN OF CARE
Problem: PAIN - ADULT  Goal: Verbalizes/displays adequate comfort level or baseline comfort level  Description: Interventions:  - Encourage patient to monitor pain and request assistance  - Assess pain using appropriate pain scale  - Administer analgesics based on type and severity of pain and evaluate response  - Implement non-pharmacological measures as appropriate and evaluate response  - Consider cultural and social influences on pain and pain management  - Notify physician/advanced practitioner if interventions unsuccessful or patient reports new pain  Outcome: Progressing     Problem: SAFETY ADULT  Goal: Patient will remain free of falls  Description: INTERVENTIONS:  - Educate patient/family on patient safety including physical limitations  - Instruct patient to call for assistance with activity   - Consult OT/PT to assist with strengthening/mobility   - Keep Call bell within reach  - Keep bed low and locked with side rails adjusted as appropriate  - Keep care items and personal belongings within reach  - Initiate and maintain comfort rounds  - Make Fall Risk Sign visible to staff  - Offer Toileting every Hours, in advance of need  - Initiate/Maintain alarm  - Obtain necessary fall risk management equipment:  - Apply yellow socks and bracelet for high fall risk patients  - Consider moving patient to room near nurses station  Outcome: Progressing     Problem: SKIN/TISSUE INTEGRITY - ADULT  Goal: Skin Integrity remains intact(Skin Breakdown Prevention)  Description: Assess:  -Perform Walker assessment every   -Clean and moisturize skin every   -Inspect skin when repositioning, toileting, and assisting with ADLS  -Assess under medical devices such as  every -Assess extremities for adequate circulation and sensation     Bed Management:  -Have minimal linens on bed & keep smooth, unwrinkled  -Change linens as needed when moist or perspiring  -Avoid sitting or lying in one position for more than  hours while in bed  -Keep HOB at degrees     Toileting:  -Offer bedside commode  -Assess for incontinence every   -Use incontinent care products after each incontinent episode such as     Activity:  -Mobilize patient  times a day  -Encourage activity and walks on unit  -Encourage or provide ROM exercises   -Turn and reposition patient every  Hours  -Use appropriate equipment to lift or move patient in bed  -Instruct/ Assist with weight shifting every  when out of bed in chair  -Consider limitation of chair time *hour intervals    Skin Care:  -Avoid use of baby powder, tape, friction and shearing, hot water or constrictive clothing  -Relieve pressure over bony prominences using   -Do not massage red bony areas    Next Steps:  -Teach patient strategies to minimize risks such as   -Consider consults to  interdisciplinary teams such as   Outcome: Progressing

## 2023-05-09 NOTE — CONSULTS
Podiatry - Consultation    Patient Information:   Tal Suh 57 y o  female MRN: [id number]  Unit/Bed#: -01 Encounter: [id number]  PCP: Jo Pinzon MD  Date of Admission:  5/8/2023  Date of Consultation: 05/09/23  Requesting Physician: Bhaskar Gilmore MD      ASSESSMENT:    Tal Suh is a 57 y o  female with:    1  L diabetic heel ulcer  2  T2DM w/peripheral neuropathy  3  CKD3    PLAN:    · Plan for local wound care consisting of Jay Soriano DSD to stable left heel diabetic ulceration  No acute clinical signs of infection present at this time  · Wound care instructions placed  Appreciate nursing assistance with dressing changes  · No acute podiatric surgical plans at this time  · LEADs reviewed: RLE- 1 08/142/10  LLE- 1 06/>254/95, within healing range  · Elevation and offloading on green foam wedges or pillows when non-ambulatory  · Rest of care per primary team   · Will discuss this plan with my attending and update as needed  Weightbearing status: Weightbearing as tolerated in heel offloading shoe    SUBJECTIVE:    History of Present Illness:    Tal Suh is a 57 y o  female who is originally admitted 5/8/2023 due to ambulatory dysfunction  Patient has a past medical history of type 2 diabetes, seizures, HTN, diabetic neuropathy, dyslipidemia, and anemia  We are consulted for left heel wound that patient states has been present for a while, and she noticed a large callus over the area  She said that she was previously seen by podiatry when she was at [facility]  She was given a moisturizer cream to put on the skin at the rehab facility she was recently at  She has not been dressing the wound  She says that her polymyalgia rheumatica has affected her walking and she thinks that may have contributed to the wound starting  Review of Systems:    Constitutional: Negative  HENT: Negative  Eyes: Negative  Respiratory: Negative  Cardiovascular: Negative  Gastrointestinal: Negative  Musculoskeletal: Negative    Skin: left heel wound    Neurological: peripheral neuropathy    Psych: Negative  Past Medical and Surgical History:     Past Medical History:   Diagnosis Date   • Anemia    • Arthritis    • Benign hypertension     Procedure/Onset: 01/01/2005; Description: BP elevated today  Pt reports running out of BP meds 2wks ago  Will resume BP meds     • Diabetes mellitus ([facility] [address] )    • Diabetes mellitus type 2 with complications, uncontrolled 9/8/2015   • Dyspnea on exertion    • Hyperlipidemia    • Hypertension    • Legally blind 2010   • Mild intermittent asthma with exacerbation 8/4/2018   • Miscarriage     x 3   • Polymyalgia rheumatica ([facility] ) 11/24/2021   • Seizures ([facility] [address] [address] )    • Sickle cell trait ([facility] [address] )    • Sleep apnea    • [facility] 5/19/2022   • Thyroid nodule 3/6/2020       Past Surgical History:   Procedure Laterality Date   • CATARACT EXTRACTION Bilateral     august and september   • EYE SURGERY     • HYSTERECTOMY      37   • OOPHORECTOMY Left     37   • ND LIGATION/BIOPSY TEMPORAL ARTERY Bilateral 10/17/2019    Procedure: BIOPSY ARTERY TEMPORAL;  Surgeon: Jack Cherry DO;  Location: [facility];  Service: Vascular   • US GUIDED THYROID BIOPSY  5/13/2020       Meds/Allergies:    Medications Prior to Admission   Medication   • acetaminophen (TYLENOL) 325 mg tablet   • amLODIPine (NORVASC) 5 mg tablet   • aspirin (ECOTRIN LOW STRENGTH) 81 mg EC tablet   • atorvastatin (LIPITOR) 40 mg tablet   • Blood Glucose Monitoring Suppl (OneTouch Verio) w/Device KIT   • Blood Pressure Monitor KIT   • budesonide-formoterol (Symbicort) 160-4 5 mcg/act inhaler   • cholecalciferol (VITAMIN D3) 1,000 units tablet   • Continuous Blood Gluc  (FreeStyle Denisa 2 [place]) ERIC   • Continuous Blood Gluc Sensor (FreeStyle Denisa 2 Sensor) MISC   • docusate sodium (COLACE) 100 mg capsule   • Emollient (eucerin) lotion   • furosemide (LASIX) 20 mg tablet   • gabapentin (NEURONTIN) 100 mg capsule   • hydrALAZINE (APRESOLINE) 10 mg tablet   • [] Insulin Glargine Solostar 100 UNIT/ML SOPN   • insulin lispro (HumaLOG) 100 units/mL injection pen   • insulin lispro (HumaLOG) 100 units/mL injection   • Lancets (OneTouch Delica Plus [id number]) MISC   • loperamide (IMODIUM) 2 mg capsule   • losartan (COZAAR) 100 MG tablet   • metoprolol tartrate (LOPRESSOR) 100 mg tablet   • multivitamin-iron-minerals-folic acid (THERAPEUTIC-M) TABS tablet   • Nutritional Supplements (ProSource No Carb) LIQD   • Petrolatum-Zinc Oxide 57-17 % PSTE   • [] predniSONE 1 mg tablet   • topiramate (TOPAMAX) 100 mg tablet       No Known Allergies    Social History:     Marital Status: /Civil Union    Substance Use History:   Social History     Substance and Sexual Activity   Alcohol Use Never   • Alcohol/week: 0 0 standard drinks     Social History     Tobacco Use   Smoking Status Never   Smokeless Tobacco Never     Social History     Substance and Sexual Activity   Drug Use No       Family History:    Family History   Problem Relation Age of Onset   • Heart attack Mother    • Heart disease Mother    • Hypertension Mother    • No Known Problems Father    • Heart disease Sister    • No Known Problems Brother    • No Known Problems Son    • No Known Problems Daughter    • Heart attack Maternal Grandmother    • Heart disease Maternal Grandmother    • Diabetes Other    • Heart attack Other    • No Known Problems Sister    • No Known Problems Sister    • No Known Problems Paternal Aunt    • No Known Problems Son    • Cancer Neg Hx    • Stroke Neg Hx          OBJECTIVE:    Vitals:   Blood Pressure: 154/89 (23)  Pulse: 89 (23)  Temperature: 98 1 °F (36 7 °C) (23)  Temp Source: Oral (23 1118)  Respirations: 20 (23)  SpO2: 95 % (23)    Physical Exam:    General Appearance: Alert, cooperative, no distress    HEENT: Head normocephalic, atraumatic, without obvious abnormality  Heart: Normal rate and rhythm  Lungs: Non-labored breathing  No respiratory distress  Abdomen: Without distension  Psychiatric: AAOx3  Lower Extremity:    Vascular:    DP & PT pulses non palpable B/L  Capillary refill time <3 seconds B/L  Skin temperature WNL B/L with slight proximal to distal skin temperature cooling      Musculoskeletal:  MMT is 5/5 in all muscle compartments bilaterally  Ankle ROM WNL B/L  No gross deformity noted B/L      Dermatological:  Thick, flaking, callus skin noted to B/L heels  Small superficial skin fissure noted to right heel      LE Wound Exam:   Wound #: 1  Location: left plantar heel  Length 4cm: Width 4cm: Depth 0 2cm:   Deepest Tissue Noted in Base: subcutaneous  Probe to Bone: No  Peripheral Skin Description: Attached  Granulation: 80% Fibrotic Tissue: 20% Necrotic Tissue: 0%   Drainage Amount: minimal, bloody  Signs of Infection: No     There is no purulence, no fluctuance, no crepitus, no ascending erythema noted from the left heel wound      Neurological:  Gross sensation is intact  Protective sensation is diminished  Patient Reports numbness and/or paresthesias  Clinical Images 05/09/23:      Additional data:     Lab Results: I have personally reviewed pertinent labs including:    Results from last 7 days   Lab Units 05/08/23  1440   WBC Thousand/uL 10 12   HEMOGLOBIN g/dL 10 3*   HEMATOCRIT % 34 6*   PLATELETS Thousands/uL 323   NEUTROS PCT % 65   LYMPHS PCT % 21   MONOS PCT % 8   EOS PCT % 4     Results from last 7 days   Lab Units 05/08/23  1440   POTASSIUM mmol/L 3 5   CHLORIDE mmol/L 115*   CO2 mmol/L 26   BUN mg/dL 22   CREATININE mg/dL 1 01   CALCIUM mg/dL 8 9           Cultures: I have personally reviewed pertinent cultures including:              Imaging: I have personally reviewed pertinent reports in PACS  EKG, Pathology, and Other Studies: I have personally reviewed pertinent reports      Time Spent "for Care: 30 minutes  More than 50% of total time spent on counseling and coordination of care as described above  ** Please Note: Portions of the record may have been created with voice recognition software  Occasional wrong word or \"sound a like\" substitutions may have occurred due to the inherent limitations of voice recognition software  Read the chart carefully and recognize, using context, where substitutions have occurred   **  "

## 2023-05-09 NOTE — PROGRESS NOTES
PROGRESS NOTE - Family Medicine Residency Bruna Alvarez 1960, 57 y o  female  MRN: [id number]    Unit/Bed#: -01 Encounter: [id number]  Primary Care Provider: Prashanth Rodríguez MD      Admission Date: 5/8/2023  Length of Stay: 0 days  Code Status:  Level 1 - Full Code  Diet: Diet Adria/CHO Controlled; Consistent Carbohydrate Diet Level 2 (5 carb servings/75 grams CHO/meal)  Consult:   IP CONSULT TO CASE MANAGEMENT  IP CONSULT TO PODIATRY      Assessment/Plan :     Discussed with [facility] team and finalization is pending attending physician attestation  * Ambulatory dysfunction  Assessment & Plan  Patient presented to the ED for open blisters of the feet bilaterally, which were draining  History of uncontrolled diabetes, neuropathy, homelessness which is affecting her care  Patient was recently discharged from rehab last week  On physical exam, blisters do not appear to be infected  Vitals and labs otherwise WNL    Plan:  · PT/OT referral  · CM referral  · Fall precautions     Homelessness  Assessment & Plan  Socially complex history, patient and her son became homeless back in February and the patient has had multiple ED visits, admissions to both hospital and STRs  Last discharged from rehab this past week, has been homeless since  Affecting her access to care and medications  · CM referral placed  · Patient would maybe benefit from pill packs on discharge given her blindness    Uncontrolled type 2 diabetes mellitus with hyperglycemia ([facility] [address] )  Assessment & Plan  Lab Results   Component Value Date    HGBA1C 11 2 (H) 04/19/2023       No results for input(s): POCGLU in the last 72 hours      Blood Sugar Average: Last 72 hrs:     Uncontrolled DM with diabetic retinopathy (legally blind), CKD stage III  Home regimen: Lantus 25 units twice daily, Humalog 5 units 3 times daily with meals  Has not been on her diabetes medications since discharge from recent rehab  · Continue Lantus 10u qhs with SSI · Will add Metformin 1000mg BID to optimize glucose control and given patient's stable renal status   · Glucose checks     Stage 3a chronic kidney disease [facility])  Assessment & Plan  Lab Results   Component Value Date    EGFR 58 05/08/2023    EGFR 64 04/16/2023    EGFR 68 04/14/2023    CREATININE 1 01 05/08/2023    CREATININE 0 94 04/16/2023    CREATININE 0 89 04/14/2023     Stable renal function  Will continue to monitor     Hypertension  Assessment & Plan  Home regimen: Amlodipine 5mg daily, Losartan 100mg daily, Lopressor 100mg q12hrs, Lasix 20mg BID, Hydralazine 10mg TID  Has not taken her BP meds in the past 24 hrs with BP on admission 154/71  · Continue Losartan 50mg daily and uptitrate as needed    Friction blisters of the soles  Assessment & Plan  Stable on exam, does not appear infected  Also has history of uncontrolled DM and neuropathy  Local wound care  Will consult podiatry    Dyslipidemia  Assessment & Plan  Continue Lipitor 40mg daily    Diabetic neuropathy [facility])  Assessment & Plan  Lab Results   Component Value Date    HGBA1C 11 2 (H) 04/19/2023       Recent Labs     05/08/23  1708 05/08/23  2146 05/09/23  0632 05/09/23  1118   POCGLU 178* 152* 163* 195*       Blood Sugar Average: Last 72 hrs:  (P) 172     Continue PTA Gabapentin 100mg BID    PMR (polymyalgia rheumatica) (HCC)  Assessment & Plan  Home regimen: prednisone 4mg daily, continue  Will need to continue outpatient follow-up    Seizures ([facility] [address] )  Assessment & Plan  Hx of seizures and migraines  Continue PTA Topamax 100mg qhs      Principal Problem:    Ambulatory dysfunction  Active Problems:    Homelessness    Uncontrolled type 2 diabetes mellitus with hyperglycemia (HCC)    Stage 3a chronic kidney disease (HCC)    Hypertension    Seizures (HCC)    PMR (polymyalgia rheumatica) (HCC)    Diabetic neuropathy (HCC)    Dyslipidemia    Friction blisters of the soles      VTE Pharm PPX: Heparin  VTE Mech PPX: sequential compression device and/or foot pump applied unless otherwise contraindicated       Hospital Course & 24hr events:     Overnight/24hr events:  Patient without acute events overnight per sign-out  No concern or report per nursing staff  Patient seen and examined at bedside and without questions or concerns  Subjective  & Review of Systems:     Review of Systems   Constitutional: Negative for chills and fever  HENT: Negative for ear pain and sore throat  Eyes: Negative for pain and visual disturbance  Respiratory: Negative for cough and shortness of breath  Cardiovascular: Negative for chest pain and palpitations  Gastrointestinal: Negative for abdominal pain and vomiting  Genitourinary: Negative for dysuria and hematuria  Musculoskeletal: Negative for arthralgias and back pain  Skin: Positive for wound (blisters of feet)  Negative for color change and rash  Neurological: Negative for seizures and syncope  All other systems reviewed and are negative  Objective :     Vitals:    Vitals:    05/08/23 1230 05/08/23 1345 05/08/23 2222 05/09/23 0818   BP:   155/76 154/89   Pulse: 84 82 82 89   Resp:   20    Temp:   98 1 °F (36 7 °C)    TempSrc:       SpO2: 97% 99% 95% 95%     Temp:  [98 1 °F (36 7 °C)] 98 1 °F (36 7 °C)  HR:  [82-89] 89  Resp:  [20] 20  BP: (154-155)/(76-89) 154/89  Weight (last 2 days)     None          Intake/Output Summary (Last 24 hours) at 5/9/2023 1303  Last data filed at 5/9/2023 0852  Gross per 24 hour   Intake 240 ml   Output 400 ml   Net -160 ml     Invasive Devices     Peripheral Intravenous Line  Duration           Peripheral IV 05/08/23 Left Antecubital <1 day                Labs: I have personally reviewed pertinent reports        Results from last 7 days   Lab Units 05/08/23  1440   WBC Thousand/uL 10 12   HEMOGLOBIN g/dL 10 3*   HEMATOCRIT % 34 6*   PLATELETS Thousands/uL 323   NEUTROS ABS Thousands/µL 6 76       Results from last 7 days   Lab Units 05/08/23  1440   POTASSIUM mmol/L 3 5 CHLORIDE mmol/L 115*   CO2 mmol/L 26   BUN mg/dL 22   CREATININE mg/dL 1 01   CALCIUM mg/dL 8 9   EGFR ml/min/1 73sq m 58               EKG, Pathology, Imaging, and Other Studies:     Lab Results   Component Value Date/Time    Blood Culture No Growth After 5 Days  04/11/2023 06:48 PM    Blood Culture No Growth After 5 Days  04/11/2023 06:40 PM    Urine Culture >100,000 cfu/ml Candida glabrata (A) 02/28/2023 08:10 PM    Urine Culture 30,000-39,000 cfu/ml 02/28/2023 08:10 PM    C difficile toxin by PCR Negative 03/06/2023 07:52 PM       No results found  Inpatient medications:     Current Facility-Administered Medications   Medication Dose Route Frequency   • atorvastatin (LIPITOR) tablet 40 mg  40 mg Oral Daily   • budesonide-formoterol (SYMBICORT) 160-4 5 mcg/act inhaler 1 puff  1 puff Inhalation BID   • cholecalciferol (VITAMIN D3) tablet 2,000 Units  2,000 Units Oral Daily   • docusate sodium (COLACE) capsule 100 mg  100 mg Oral BID   • gabapentin (NEURONTIN) capsule 100 mg  100 mg Oral BID   • heparin (porcine) subcutaneous injection 7,500 Units  7,500 Units Subcutaneous Q8H [address]   • insulin glargine (LANTUS) subcutaneous injection 10 Units 0 1 mL  10 Units Subcutaneous HS   • insulin lispro (HumaLOG) 100 units/mL subcutaneous injection 2-12 Units  2-12 Units Subcutaneous TID AC   • losartan (COZAAR) tablet 50 mg  50 mg Oral Daily   • metFORMIN (GLUCOPHAGE) tablet 1,000 mg  1,000 mg Oral BID With Meals   • predniSONE tablet 4 mg  4 mg Oral Daily   • topiramate (TOPAMAX) tablet 100 mg  100 mg Oral HS         Physical Exam :     Physical Exam  Vitals reviewed  Constitutional:       General: She is not in acute distress  Appearance: Normal appearance  HENT:      Head: Normocephalic and atraumatic  Nose: Nose normal    Eyes:      Extraocular Movements: Extraocular movements intact  Conjunctiva/sclera: Conjunctivae normal    Cardiovascular:      Rate and Rhythm: Normal rate and regular rhythm  Heart sounds: Normal heart sounds  No murmur heard  Pulmonary:      Effort: Pulmonary effort is normal  No respiratory distress  Breath sounds: Normal breath sounds  Abdominal:      General: Abdomen is flat  Palpations: Abdomen is soft  Tenderness: There is no abdominal tenderness  Musculoskeletal:         General: Normal range of motion  Cervical back: Normal range of motion  Skin:     Comments: Blisters of feet bilaterally, left foot wrapped due to open blisters   Neurological:      Mental Status: She is alert and oriented to person, place, and time     Psychiatric:         Mood and Affect: Mood normal          Behavior: Behavior normal                Tia MD Skye  PGY-2, Family Medicine  05/09/23  1:03 PM

## 2023-05-10 LAB
ANION GAP SERPL CALCULATED.3IONS-SCNC: 3 MMOL/L (ref 4–13)
BASOPHILS # BLD AUTO: 0.07 THOUSANDS/ÂΜL (ref 0–0.1)
BASOPHILS NFR BLD AUTO: 1 % (ref 0–1)
BUN SERPL-MCNC: 13 MG/DL (ref 5–25)
CALCIUM SERPL-MCNC: 8.9 MG/DL (ref 8.3–10.1)
CHLORIDE SERPL-SCNC: 112 MMOL/L (ref 96–108)
CO2 SERPL-SCNC: 23 MMOL/L (ref 21–32)
CREAT SERPL-MCNC: 0.92 MG/DL (ref 0.6–1.3)
EOSINOPHIL # BLD AUTO: 0.42 THOUSAND/ÂΜL (ref 0–0.61)
EOSINOPHIL NFR BLD AUTO: 5 % (ref 0–6)
ERYTHROCYTE [DISTWIDTH] IN BLOOD BY AUTOMATED COUNT: 18.6 % (ref 11.6–15.1)
GFR SERPL CREATININE-BSD FRML MDRD: 66 ML/MIN/1.73SQ M
GLUCOSE SERPL-MCNC: 171 MG/DL (ref 65–140)
GLUCOSE SERPL-MCNC: 173 MG/DL (ref 65–140)
GLUCOSE SERPL-MCNC: 212 MG/DL (ref 65–140)
GLUCOSE SERPL-MCNC: 219 MG/DL (ref 65–140)
GLUCOSE SERPL-MCNC: 243 MG/DL (ref 65–140)
HCT VFR BLD AUTO: 32.6 % (ref 34.8–46.1)
HGB BLD-MCNC: 10 G/DL (ref 11.5–15.4)
IMM GRANULOCYTES # BLD AUTO: 0.08 THOUSAND/UL (ref 0–0.2)
IMM GRANULOCYTES NFR BLD AUTO: 1 % (ref 0–2)
LYMPHOCYTES # BLD AUTO: 2.74 THOUSANDS/ÂΜL (ref 0.6–4.47)
LYMPHOCYTES NFR BLD AUTO: 29 % (ref 14–44)
MCH RBC QN AUTO: 23.5 PG (ref 26.8–34.3)
MCHC RBC AUTO-ENTMCNC: 30.7 G/DL (ref 31.4–37.4)
MCV RBC AUTO: 77 FL (ref 82–98)
MONOCYTES # BLD AUTO: 0.8 THOUSAND/ÂΜL (ref 0.17–1.22)
MONOCYTES NFR BLD AUTO: 9 % (ref 4–12)
NEUTROPHILS # BLD AUTO: 5.23 THOUSANDS/ÂΜL (ref 1.85–7.62)
NEUTS SEG NFR BLD AUTO: 55 % (ref 43–75)
NRBC BLD AUTO-RTO: 0 /100 WBCS
PLATELET # BLD AUTO: 304 THOUSANDS/UL (ref 149–390)
PMV BLD AUTO: 10.9 FL (ref 8.9–12.7)
POTASSIUM SERPL-SCNC: 3.5 MMOL/L (ref 3.5–5.3)
RBC # BLD AUTO: 4.26 MILLION/UL (ref 3.81–5.12)
SODIUM SERPL-SCNC: 138 MMOL/L (ref 135–147)
WBC # BLD AUTO: 9.34 THOUSAND/UL (ref 4.31–10.16)

## 2023-05-10 RX ORDER — LOSARTAN POTASSIUM 50 MG/1
100 TABLET ORAL DAILY
Status: DISCONTINUED | OUTPATIENT
Start: 2023-05-10 | End: 2023-05-18 | Stop reason: HOSPADM

## 2023-05-10 RX ORDER — HYDRALAZINE HYDROCHLORIDE 20 MG/ML
10 INJECTION INTRAMUSCULAR; INTRAVENOUS ONCE
Status: DISCONTINUED | OUTPATIENT
Start: 2023-05-10 | End: 2023-05-10

## 2023-05-10 RX ORDER — INSULIN GLARGINE 100 [IU]/ML
15 INJECTION, SOLUTION SUBCUTANEOUS
Status: DISCONTINUED | OUTPATIENT
Start: 2023-05-10 | End: 2023-05-11

## 2023-05-10 RX ADMIN — Medication 2000 UNITS: at 10:17

## 2023-05-10 RX ADMIN — INSULIN LISPRO 4 UNITS: 100 INJECTION, SOLUTION INTRAVENOUS; SUBCUTANEOUS at 17:23

## 2023-05-10 RX ADMIN — HEPARIN SODIUM 7500 UNITS: 5000 INJECTION INTRAVENOUS; SUBCUTANEOUS at 06:01

## 2023-05-10 RX ADMIN — HEPARIN SODIUM 7500 UNITS: 5000 INJECTION INTRAVENOUS; SUBCUTANEOUS at 21:10

## 2023-05-10 RX ADMIN — BUDESONIDE AND FORMOTEROL FUMARATE DIHYDRATE 1 PUFF: 160; 4.5 AEROSOL RESPIRATORY (INHALATION) at 17:23

## 2023-05-10 RX ADMIN — ATORVASTATIN CALCIUM 40 MG: 40 TABLET, FILM COATED ORAL at 10:17

## 2023-05-10 RX ADMIN — DOCUSATE SODIUM 100 MG: 100 CAPSULE, LIQUID FILLED ORAL at 10:17

## 2023-05-10 RX ADMIN — LOSARTAN POTASSIUM 100 MG: 50 TABLET, FILM COATED ORAL at 10:17

## 2023-05-10 RX ADMIN — HEPARIN SODIUM 7500 UNITS: 5000 INJECTION INTRAVENOUS; SUBCUTANEOUS at 13:46

## 2023-05-10 RX ADMIN — INSULIN GLARGINE 15 UNITS: 100 INJECTION, SOLUTION SUBCUTANEOUS at 21:10

## 2023-05-10 RX ADMIN — INSULIN LISPRO 4 UNITS: 100 INJECTION, SOLUTION INTRAVENOUS; SUBCUTANEOUS at 11:32

## 2023-05-10 RX ADMIN — METFORMIN HYDROCHLORIDE 1000 MG: 500 TABLET ORAL at 17:22

## 2023-05-10 RX ADMIN — GABAPENTIN 100 MG: 100 CAPSULE ORAL at 10:17

## 2023-05-10 RX ADMIN — METFORMIN HYDROCHLORIDE 1000 MG: 500 TABLET ORAL at 10:16

## 2023-05-10 RX ADMIN — TOPIRAMATE 100 MG: 100 TABLET, FILM COATED ORAL at 21:10

## 2023-05-10 RX ADMIN — PREDNISONE 4 MG: 1 TABLET ORAL at 10:16

## 2023-05-10 RX ADMIN — BUDESONIDE AND FORMOTEROL FUMARATE DIHYDRATE 1 PUFF: 160; 4.5 AEROSOL RESPIRATORY (INHALATION) at 10:21

## 2023-05-10 RX ADMIN — DOCUSATE SODIUM 100 MG: 100 CAPSULE, LIQUID FILLED ORAL at 17:22

## 2023-05-10 RX ADMIN — GABAPENTIN 100 MG: 100 CAPSULE ORAL at 17:22

## 2023-05-10 NOTE — PLAN OF CARE
Problem: MOBILITY - ADULT  Goal: Maintain or return to baseline ADL function  Description: INTERVENTIONS:  -  Assess patient's ability to carry out ADLs; assess patient's baseline for ADL function and identify physical deficits which impact ability to perform ADLs (bathing, care of mouth/teeth, toileting, grooming, dressing, etc )  - Assess/evaluate cause of self-care deficits   - Assess range of motion  - Assess patient's mobility; develop plan if impaired  - Assess patient's need for assistive devices and provide as appropriate  - Encourage maximum independence but intervene and supervise when necessary  - Involve family in performance of ADLs  - Assess for home care needs following discharge   - Consider OT consult to assist with ADL evaluation and planning for discharge  - Provide patient education as appropriate  Outcome: Progressing  Goal: Maintains/Returns to pre admission functional level  Description: INTERVENTIONS:  - Perform BMAT or MOVE assessment daily    - Set and communicate daily mobility goal to care team and patient/family/caregiver  - Collaborate with rehabilitation services on mobility goals if consulted  - Perform Range of Motion 4 times a day  - Reposition patient every 2 hours    - Dangle patient 4 times a day  - Stand patient 3 times a day  - Ambulate patient 3 times a day  - Out of bed to chair 3 times a day   - Out of bed for meals 3 times a day  - Out of bed for toileting  - Record patient progress and toleration of activity level   Outcome: Progressing     Problem: PAIN - ADULT  Goal: Verbalizes/displays adequate comfort level or baseline comfort level  Description: Interventions:  - Encourage patient to monitor pain and request assistance  - Assess pain using appropriate pain scale  - Administer analgesics based on type and severity of pain and evaluate response  - Implement non-pharmacological measures as appropriate and evaluate response  - Consider cultural and social influences on pain and pain management  - Notify physician/advanced practitioner if interventions unsuccessful or patient reports new pain  Outcome: Progressing     Problem: INFECTION - ADULT  Goal: Absence or prevention of progression during hospitalization  Description: INTERVENTIONS:  - Assess and monitor for signs and symptoms of infection  - Monitor lab/diagnostic results  - Monitor all insertion sites, i e  indwelling lines, tubes, and drains  - Monitor endotracheal if appropriate and nasal secretions for changes in amount and color  - [place] appropriate cooling/warming therapies per order  - Administer medications as ordered  - Instruct and encourage patient and family to use good hand hygiene technique  - Identify and instruct in appropriate isolation precautions for identified infection/condition  Outcome: Progressing  Goal: Absence of fever/infection during neutropenic period  Description: INTERVENTIONS:  - Monitor WBC    Outcome: Progressing     Problem: SAFETY ADULT  Goal: Maintain or return to baseline ADL function  Description: INTERVENTIONS:  -  Assess patient's ability to carry out ADLs; assess patient's baseline for ADL function and identify physical deficits which impact ability to perform ADLs (bathing, care of mouth/teeth, toileting, grooming, dressing, etc )  - Assess/evaluate cause of self-care deficits   - Assess range of motion  - Assess patient's mobility; develop plan if impaired  - Assess patient's need for assistive devices and provide as appropriate  - Encourage maximum independence but intervene and supervise when necessary  - Involve family in performance of ADLs  - Assess for home care needs following discharge   - Consider OT consult to assist with ADL evaluation and planning for discharge  - Provide patient education as appropriate  Outcome: Progressing  Goal: Maintains/Returns to pre admission functional level  Description: INTERVENTIONS:  - Perform BMAT or MOVE assessment daily    - Set and communicate daily mobility goal to care team and patient/family/caregiver  - Collaborate with rehabilitation services on mobility goals if consulted  - Perform Range of Motion 4 times a day  - Reposition patient every 2 hours    - Dangle patient 4 times a day  - Stand patient 3 times a day  - Ambulate patient 3 times a day  - Out of bed to chair 3 times a day   - Out of bed for meals 3 times a day  - Out of bed for toileting  - Record patient progress and toleration of activity level   Outcome: Progressing  Goal: Patient will remain free of falls  Description: INTERVENTIONS:  - Educate patient/family on patient safety including physical limitations  - Instruct patient to call for assistance with activity   - Consult OT/PT to assist with strengthening/mobility   - Keep Call bell within reach  - Keep bed low and locked with side rails adjusted as appropriate  - Keep care items and personal belongings within reach  - Initiate and maintain comfort rounds  - Make Fall Risk Sign visible to staff  - Offer Toileting every 2 Hours, in advance of need  - Initiate/Maintain bed alarm  - Obtain necessary fall risk management equipment  - Apply yellow socks and bracelet for high fall risk patients  - Consider moving patient to room near nurses station  Outcome: Progressing     Problem: DISCHARGE PLANNING  Goal: Discharge to home or other facility with appropriate resources  Description: INTERVENTIONS:  - Identify barriers to discharge w/patient and caregiver  - Arrange for needed discharge resources and transportation as appropriate  - Identify discharge learning needs (meds, wound care, etc )  - Arrange for interpretive services to assist at discharge as needed  - Refer to Case Management Department for coordinating discharge planning if the patient needs post-hospital services based on physician/advanced practitioner order or complex needs related to functional status, cognitive ability, or social support system  Outcome: Progressing     Problem: SKIN/TISSUE INTEGRITY - ADULT  Goal: Skin Integrity remains intact(Skin Breakdown Prevention)  Description: Assess:  -Perform Walker assessment  -Clean and moisturize skin  -Inspect skin when repositioning, toileting, and assisting with ADLS  -Assess under medical devices  -Assess extremities for adequate circulation and sensation     Bed Management:  -Have minimal linens on bed & keep smooth, unwrinkled  -Change linens as needed when moist or perspiring  -Avoid sitting or lying in one position for more than 2 hours while in bed  -Keep HOB at 30degrees     Toileting:  -Offer bedside commode  -Assess for incontinence  -Use incontinent care products after each incontinent episode    Activity:  -Mobilize patient 4 times a day  -Encourage activity and walks on unit  -Encourage or provide ROM exercises   -Turn and reposition patient every 2 Hours  -Use appropriate equipment to lift or move patient in bed  -Instruct/ Assist with weight shifting every hour when out of bed in chair  -Consider limitation of chair time 1 hour intervals    Skin Care:  -Avoid use of baby powder, tape, friction and shearing, hot water or constrictive clothing  -Relieve pressure over bony prominences  -Do not massage red bony areas    Next Steps:  -Teach patient strategies to minimize risks   -Consider consults to  interdisciplinary teams  Outcome: Progressing  Goal: Incision(s), wounds(s) or drain site(s) healing without S/S of infection  Description: INTERVENTIONS  - Assess and document dressing, incision, wound bed, drain sites and surrounding tissue  - Provide patient and family education  - Perform skin care/dressing changes  Outcome: Progressing  Goal: Pressure injury heals and does not worsen  Description: Interventions:  - Implement low air loss mattress or specialty surface (Criteria met)  - Apply silicone foam dressing  - Instruct/assist with weight shifting every 30 minutes when in chair   - Limit chair time to 1 hour intervals  - Use special pressure reducing interventions when in chair   - Apply fecal or urinary incontinence containment device   - Perform passive or active ROM  - Turn and reposition patient & offload bony prominences   - Utilize friction reducing device or surface for transfers   - Consider consults to  interdisciplinary teams  - Use incontinent care products after each incontinent episode  - Consider nutrition services referral as needed  Outcome: Progressing     Problem: MUSCULOSKELETAL - ADULT  Goal: Maintain or return mobility to safest level of function  Description: INTERVENTIONS:  - Assess patient's ability to carry out ADLs; assess patient's baseline for ADL function and identify physical deficits which impact ability to perform ADLs (bathing, care of mouth/teeth, toileting, grooming, dressing, etc )  - Assess/evaluate cause of self-care deficits   - Assess range of motion  - Assess patient's mobility  - Assess patient's need for assistive devices and provide as appropriate  - Encourage maximum independence but intervene and supervise when necessary  - Involve family in performance of ADLs  - Assess for home care needs following discharge   - Consider OT consult to assist with ADL evaluation and planning for discharge  - Provide patient education as appropriate  Outcome: Progressing  Goal: Maintain proper alignment of affected body part  Description: INTERVENTIONS:  - Support, maintain and protect limb and body alignment  - Provide patient/ family with appropriate education  Outcome: Progressing     Problem: Prexisting or High Potential for Compromised Skin Integrity  Goal: Skin integrity is maintained or improved  Description: INTERVENTIONS:  - Identify patients at risk for skin breakdown  - Assess and monitor skin integrity  - Assess and monitor nutrition and hydration status  - Monitor labs   - Assess for incontinence   - Turn and reposition patient  - Assist with mobility/ambulation  - Relieve pressure over bony prominences  - Avoid friction and shearing  - Provide appropriate hygiene as needed including keeping skin clean and dry  - Evaluate need for skin moisturizer/barrier cream  - Collaborate with interdisciplinary team   - Patient/family teaching  - Consider wound care consult   Outcome: Progressing

## 2023-05-10 NOTE — PROGRESS NOTES
PROGRESS NOTE - Family Medicine Residency Alexi Torres 1960, 57 y o  female  MRN: [id number]    Unit/Bed#: -01 Encounter: [id number]  Primary Care Provider: Filomena Bowser MD      Admission Date: 5/8/2023  Length of Stay: 1 days  Code Status:  Level 1 - Full Code  Diet: Diet Adria/CHO Controlled; Consistent Carbohydrate Diet Level 2 (5 carb servings/75 grams CHO/meal)  Consult:   IP CONSULT TO CASE MANAGEMENT  IP CONSULT TO PODIATRY      Assessment/Plan :     Discussed with [facility] team and finalization is pending attending physician attestation  * Ambulatory dysfunction  Assessment & Plan  Patient presented to the ED for open blisters of the feet bilaterally, which were draining  History of uncontrolled diabetes, neuropathy, homelessness which is affecting her care  Patient was recently discharged from rehab last week  On physical exam, blisters do not appear to be infected  Vitals and labs otherwise WNL    Plan:  · PT/OT referral  · CM referral  · Fall precautions     Homelessness  Assessment & Plan  Socially complex history, patient and her son became homeless back in February and the patient has had multiple ED visits, admissions to both hospital and STRs  Last discharged from rehab this past week, has been homeless since  Affecting her access to care and medications  · CM referral placed  · Patient would maybe benefit from pill packs on discharge given her blindness    Uncontrolled type 2 diabetes mellitus with hyperglycemia ([facility] )  Assessment & Plan  Lab Results   Component Value Date    HGBA1C 11 2 (H) 04/19/2023       No results for input(s): POCGLU in the last 72 hours      Blood Sugar Average: Last 72 hrs:     Uncontrolled DM with diabetic retinopathy (legally blind), CKD stage III  Home regimen: Lantus 25 units twice daily, Humalog 5 units 3 times daily with meals  Has not been on her diabetes medications since discharge from recent rehab  · Continue Lantus 10u qhs with SSI · Will add Metformin 1000mg BID to optimize glucose control and given patient's stable renal status   · Glucose checks     Stage 3a chronic kidney disease [facility])  Assessment & Plan  Lab Results   Component Value Date    EGFR 58 05/08/2023    EGFR 64 04/16/2023    EGFR 68 04/14/2023    CREATININE 1 01 05/08/2023    CREATININE 0 94 04/16/2023    CREATININE 0 89 04/14/2023     Stable renal function  Will continue to monitor     Hypertension  Assessment & Plan  Home regimen: Amlodipine 5mg daily, Losartan 100mg daily, Lopressor 100mg q12hrs, Lasix 20mg BID, Hydralazine 10mg TID  Has not taken her BP meds in the past 24 hrs with BP on admission 154/71  · Continue Losartan 50mg daily and uptitrate as needed    Friction blisters of the soles  Assessment & Plan  Stable on exam, does not appear infected  Also has history of uncontrolled DM and neuropathy  Local wound care  Will consult podiatry    Dyslipidemia  Assessment & Plan  Continue Lipitor 40mg daily    Diabetic neuropathy [facility])  Assessment & Plan  Lab Results   Component Value Date    HGBA1C 11 2 (H) 04/19/2023       Recent Labs     05/08/23  1708 05/08/23  2146 05/09/23  0632 05/09/23  1118   POCGLU 178* 152* 163* 195*       Blood Sugar Average: Last 72 hrs:  (P) 172     Continue PTA Gabapentin 100mg BID    PMR (polymyalgia rheumatica) (HCC)  Assessment & Plan  Home regimen: prednisone 4mg daily, continue  Will need to continue outpatient follow-up    Seizures ([facility] [address] )  Assessment & Plan  Hx of seizures and migraines  Continue PTA Topamax 100mg qhs        Principal Problem:    Ambulatory dysfunction  Active Problems:    Homelessness    Uncontrolled type 2 diabetes mellitus with hyperglycemia (HCC)    Stage 3a chronic kidney disease (HCC)    Hypertension    Seizures (HCC)    PMR (polymyalgia rheumatica) (HCC)    Diabetic neuropathy (HCC)    Dyslipidemia    Friction blisters of the soles      VTE Pharm PPX: Heparin  VTE Mech PPX: sequential compression device and/or foot pump applied unless otherwise contraindicated       Hospital Course & 24hr events:     Overnight/24hr events:  Patient without acute events overnight per sign-out  No concern or report per nursing staff  Patient seen and examined at bedside and without questions or concerns  Subjective  & Review of Systems:     Review of Systems   Constitutional: Negative for chills and fever  HENT: Negative for ear pain and sore throat  Eyes: Negative for pain and visual disturbance  Respiratory: Negative for cough and shortness of breath  Cardiovascular: Negative for chest pain and palpitations  Gastrointestinal: Negative for abdominal pain and vomiting  Genitourinary: Negative for dysuria and hematuria  Musculoskeletal: Negative for arthralgias and back pain  Skin: Negative for color change and rash  Neurological: Negative for seizures and syncope  All other systems reviewed and are negative  Review of systems was reviewed and negative unless stated above in HPI/24-hour events             Objective :     Vitals:    Vitals:    05/09/23 0818 05/09/23 1525 05/09/23 2134 05/10/23 0723   BP: 154/89 133/78 168/91 159/82   Pulse: 89 91 99 89   Resp:    17   Temp:  (!) 97 4 °F (36 3 °C) 98 2 °F (36 8 °C) (!) 97 4 °F (36 3 °C)   TempSrc:       SpO2: 95% 95% 94% 92%     Temp:  [97 4 °F (36 3 °C)-98 2 °F (36 8 °C)] 97 4 °F (36 3 °C)  HR:  [89-99] 89  Resp:  [17] 17  BP: (133-168)/(78-91) 159/82  Weight (last 2 days)     None          Intake/Output Summary (Last 24 hours) at 5/10/2023 1107  Last data filed at 5/9/2023 1900  Gross per 24 hour   Intake 120 ml   Output 300 ml   Net -180 ml     Invasive Devices     Peripheral Intravenous Line  Duration           Peripheral IV 05/08/23 Left Antecubital 1 day                Labs: I have personally reviewed pertinent reports        Results from last 7 days   Lab Units 05/10/23  0538 05/08/23  1440   WBC Thousand/uL 9 34 10 12   HEMOGLOBIN g/dL 10 0* 10 3* HEMATOCRIT % 32 6* 34 6*   PLATELETS Thousands/uL 304 323   NEUTROS ABS Thousands/µL 5 23 6 76       Results from last 7 days   Lab Units 05/10/23  0538 05/08/23  1440   POTASSIUM mmol/L 3 5 3 5   CHLORIDE mmol/L 112* 115*   CO2 mmol/L 23 26   BUN mg/dL 13 22   CREATININE mg/dL 0 92 1 01   CALCIUM mg/dL 8 9 8 9   EGFR ml/min/1 73sq m 66 58               EKG, Pathology, Imaging, and Other Studies:     Lab Results   Component Value Date/Time    Blood Culture No Growth After 5 Days  04/11/2023 06:48 PM    Blood Culture No Growth After 5 Days  04/11/2023 06:40 PM    Urine Culture >100,000 cfu/ml Candida glabrata (A) 02/28/2023 08:10 PM    Urine Culture 30,000-39,000 cfu/ml 02/28/2023 08:10 PM    C difficile toxin by PCR Negative 03/06/2023 07:52 PM       No results found  Inpatient medications:     Current Facility-Administered Medications   Medication Dose Route Frequency   • atorvastatin (LIPITOR) tablet 40 mg  40 mg Oral Daily   • budesonide-formoterol (SYMBICORT) 160-4 5 mcg/act inhaler 1 puff  1 puff Inhalation BID   • cholecalciferol (VITAMIN D3) tablet 2,000 Units  2,000 Units Oral Daily   • docusate sodium (COLACE) capsule 100 mg  100 mg Oral BID   • gabapentin (NEURONTIN) capsule 100 mg  100 mg Oral BID   • heparin (porcine) subcutaneous injection 7,500 Units  7,500 Units Subcutaneous Q8H [facility]   • insulin glargine (LANTUS) subcutaneous injection 15 Units 0 15 mL  15 Units Subcutaneous HS   • insulin lispro (HumaLOG) 100 units/mL subcutaneous injection 2-12 Units  2-12 Units Subcutaneous TID AC   • losartan (COZAAR) tablet 100 mg  100 mg Oral Daily   • metFORMIN (GLUCOPHAGE) tablet 1,000 mg  1,000 mg Oral BID With Meals   • predniSONE tablet 4 mg  4 mg Oral Daily   • topiramate (TOPAMAX) tablet 100 mg  100 mg Oral HS         Physical Exam :     Physical Exam  Vitals reviewed  Constitutional:       General: She is not in acute distress  Appearance: Normal appearance     HENT:      Head: Normocephalic and atraumatic  Nose: Nose normal    Eyes:      Extraocular Movements: Extraocular movements intact  Conjunctiva/sclera: Conjunctivae normal       Comments: Legally blind   Cardiovascular:      Rate and Rhythm: Normal rate and regular rhythm  Heart sounds: Normal heart sounds  No murmur heard  Pulmonary:      Effort: Pulmonary effort is normal  No respiratory distress  Breath sounds: Normal breath sounds  Abdominal:      General: Abdomen is flat  There is no distension  Palpations: Abdomen is soft  Tenderness: There is no abdominal tenderness  Musculoskeletal:      Cervical back: Normal range of motion  Skin:     General: Skin is warm  Neurological:      Mental Status: She is alert and oriented to person, place, and time     Psychiatric:         Mood and Affect: Mood normal          Behavior: Behavior normal                Solange Aguilar MD  PGY-2, Family Medicine  05/10/23  11:07 AM

## 2023-05-10 NOTE — PLAN OF CARE
Problem: OCCUPATIONAL THERAPY ADULT  Goal: Performs self-care activities at highest level of function for planned discharge setting  See evaluation for individualized goals  Description: Treatment Interventions: ADL retraining, Functional transfer training, Endurance training, Patient/family training, Equipment evaluation/education, Compensatory technique education, Continued evaluation, Energy conservation, Activityengagement          See flowsheet documentation for full assessment, interventions and recommendations  Outcome: Progressing  Note: Limitation: Decreased ADL status, Decreased Safe judgement during ADL, Decreased endurance, Decreased high-level ADLs, Decreased self-care trans  Prognosis: Fair  Assessment: Patient participated in Skilled OT session this date with interventions consisting of ADL re-training, functional transfers/mobility, standing balance  Patient agreeable to OT treatment session, upon arrival patient was found seated OOB to Recliner  Pt requiring MIN A to don/doff socks, able to maintain sitting balance while flexing trunk forward to reach feet  Assistance required to don L sock over bandages  Pt does own a reacher (has it in hospital room); discussed use of reacher to assist in LB dressing  Pt requiring VCs for safe transfer technique and VCs to manage some obstacles 2' low vision  Reports she can see outlines of shapes  Pt becoming emotional re: social situation; provided emotional support  Discussed rehab recommendation & educated on use of heel offloading shoe and RW for mobility  Pt demo Good understanding  Patient continues to be functioning below baseline level, occupational performance remains limited secondary to factors listed above and increased risk for falls and injury  From OT standpoint, recommendation at time of d/c would be POST-ACUTE [facility] SERVICES     Patient to benefit from continued Occupational Therapy treatment while in the hospital to address deficits as defined above and maximize level of functional independence with ADLs and functional mobility       OT Discharge Recommendation: Post acute rehabilitation services

## 2023-05-10 NOTE — PLAN OF CARE
Problem: MOBILITY - ADULT  Goal: Maintains/Returns to pre admission functional level  Description: INTERVENTIONS:  - Perform BMAT or MOVE assessment daily    - Set and communicate daily mobility goal to care team and patient/family/caregiver  - Collaborate with rehabilitation services on mobility goals if consulted  - Perform Range of Motion  times a day  - Reposition patient every  hours  - Dangle patient  times a day  - Stand patient  times a day  - Ambulate patient  times a day  - Out of bed to chair  times a day   - Out of bed for meals  times a day  - Out of bed for toileting  - Record patient progress and toleration of activity level   Outcome: Progressing     Problem: PAIN - ADULT  Goal: Verbalizes/displays adequate comfort level or baseline comfort level  Description: Interventions:  - Encourage patient to monitor pain and request assistance  - Assess pain using appropriate pain scale  - Administer analgesics based on type and severity of pain and evaluate response  - Implement non-pharmacological measures as appropriate and evaluate response  - Consider cultural and social influences on pain and pain management  - Notify physician/advanced practitioner if interventions unsuccessful or patient reports new pain  Outcome: Progressing     Problem: SAFETY ADULT  Goal: Maintains/Returns to pre admission functional level  Description: INTERVENTIONS:  - Perform BMAT or MOVE assessment daily    - Set and communicate daily mobility goal to care team and patient/family/caregiver  - Collaborate with rehabilitation services on mobility goals if consulted  - Perform Range of Motion  times a day  - Reposition patient every  hours    - Dangle patient  times a day  - Stand patient times a day  - Ambulate patient  times a day  - Out of bed to chair  times a day   - Out of bed for meals  times a day  - Out of bed for toileting  - Record patient progress and toleration of activity level   Outcome: Progressing Problem: SKIN/TISSUE INTEGRITY - ADULT  Goal: Pressure injury heals and does not worsen  Description: Interventions:  - Implement low air loss mattress or specialty surface (Criteria met)  - Apply silicone foam dressing  - Instruct/assist with weight shifting every  minutes when in chair   - Limit chair time to hour intervals  - Use special pressure reducing interventions such as  when in chair   - Apply fecal or urinary incontinence containment device   - Perform passive or active ROM every   - Turn and reposition patient & offload bony prominences every  hours   - Utilize friction reducing device or surface for transfers   - Consider consults to  interdisciplinary teams such as - Use incontinent care products after each incontinent episode such as   - Consider nutrition services referral as needed  Outcome: Progressing

## 2023-05-10 NOTE — OCCUPATIONAL THERAPY NOTE
Occupational Therapy Progress Note     Patient Name: Sheldon SIMON Date: 5/10/2023  Problem List  Principal Problem:    Ambulatory dysfunction  Active Problems:    Uncontrolled type 2 diabetes mellitus with hyperglycemia (HCC)    Seizures (HCC)    PMR (polymyalgia rheumatica) (HCC)    Hypertension    Stage 3a chronic kidney disease (HCC)    Diabetic neuropathy (HCC)    Homelessness    Dyslipidemia    Friction blisters of the soles            05/10/23 1453   OT Last Visit   OT Visit Date 05/10/23   Note Type   Note Type Treatment   Pain Assessment   Pain Assessment Tool 0-10   Pain Score No Pain   Restrictions/Precautions   Weight Bearing Precautions Per Order Yes   RLE Weight Bearing Per Order WBAT  (in surgical shoe)   LLE Weight Bearing Per Order WBAT  (heel offloading shoe)   Braces or Orthoses   (L heel offloading shoe, R surgical shoe)   Other Precautions WBS; Multiple lines; Fall Risk   ADL   Grooming Assistance 5  Supervision/Setup   Grooming Deficit Supervision/safety; Increased time to complete;Standing with assistive device   Grooming Comments standing at sink-side, washed hands, mouthwash   LB Dressing Assistance 4  Minimal Assistance   LB Dressing Deficit Steadying;Supervision/safety; Increased time to complete; Don/doff L sock   LB Dressing Comments able to doff socks, assist to don L sock   Toileting Assistance  4  Minimal Assistance   Toileting Deficit Steadying;Supervison/safety; Increased time to complete;Grab bar use   Transfers   Sit to Stand 5  Supervision   Additional items Increased time required;Armrests   Stand to Sit 5  Supervision   Additional items Increased time required;Armrests   Toilet transfer 4  Minimal assistance   Additional items Assist x 1; Increased time required;Standard toilet  (grab bar)   Additional Comments RW   Functional Mobility   Functional Mobility 4  Minimal assistance   Additional Comments w/in room and bathroom   Additional items Rolling walker   Cognition Overall Cognitive Status [facility]   Arousal/Participation Alert; Cooperative   Attention Within functional limits   Orientation Level Oriented X4   Memory Decreased recall of precautions   Following Commands Follows one step commands without difficulty   Comments Pleasant and cooperative  Became emotional re: social situation, provided emotional support  Pt requiring occasional safety cues   Activity Tolerance   Activity Tolerance Patient tolerated treatment well;Patient limited by fatigue   Medical Staff Made Aware RN   Assessment   Assessment Patient participated in Skilled OT session this date with interventions consisting of ADL re-training, functional transfers/mobility, standing balance  Patient agreeable to OT treatment session, upon arrival patient was found seated OOB to Recliner  Pt requiring MIN A to don/doff socks, able to maintain sitting balance while flexing trunk forward to reach feet  Assistance required to don L sock over bandages  Pt does own a reacher (has it in hospital room); discussed use of reacher to assist in LB dressing  Pt requiring VCs for safe transfer technique and VCs to manage some obstacles 2' low vision  Reports she can see outlines of shapes  Pt becoming emotional re: social situation; provided emotional support  Discussed rehab recommendation & educated on use of heel offloading shoe and RW for mobility  Pt demo Good understanding  Patient continues to be functioning below baseline level, occupational performance remains limited secondary to factors listed above and increased risk for falls and injury  From OT standpoint, recommendation at time of d/c would be POST-ACUTE [facility] SERVICES  Patient to benefit from continued Occupational Therapy treatment while in the hospital to address deficits as defined above and maximize level of functional independence with ADLs and functional mobility     Plan   Treatment Interventions ADL retraining;Functional transfer training;Patient/family training;Equipment evaluation/education; Compensatory technique education; Activityengagement   Goal Expiration Date 05/23/23   OT Treatment Day 1   OT Frequency 2-3x/wk   Recommendation   OT Discharge Recommendation Post acute rehabilitation services   AM-PAC Daily Activity Inpatient   Lower Body Dressing 3   Bathing 3   Toileting 3   Upper Body Dressing 3   Grooming 4   Eating 4   Daily Activity Raw Score 20   Daily Activity Standardized Score (Calc for Raw Score >=11) 42 03   AM-PAC Applied Cognition Inpatient   Following a Speech/Presentation 4   Understanding Ordinary Conversation 4   Taking Medications 4   Remembering Where Things Are Placed or Put Away 4   Remembering List of 4-5 Errands 4   Taking Care of Complicated Tasks 3   Applied Cognition Raw Score 23   Applied Cognition Standardized Score 53 08   End of Consult   Patient Position at End of Consult Bedside chair; All needs within reach   Nurse Communication Nurse aware of consult         The patient's raw score on the AM-PAC Daily Activity Inpatient Short Form is 20  A raw score of greater than or equal to 19 suggests the patient may benefit from discharge to home  Please refer to the recommendation of the Occupational Therapist for safe discharge planning        MERLYN Swan/TAYLOR

## 2023-05-10 NOTE — UTILIZATION REVIEW
Continued Stay Review    Date:  5-10-23                        Current Patient Class: inpatient  Current Level of Care: med surg     HPI:62 y o  female initially admitted on 5-9-23     Assessment/Plan:   PT/OT evaluations completed with recommendation for inpatient rehab  Patient is homelessness  Podiatry consult completed  Left heel diabetic ulceration complicated by type 2  Diabetes  Wound continues to improve  Continue wt bear as tolerated with gobal ped shoe  Add metformin bid to optimize glucose control          Vital Signs:     Date/Time Temp Pulse Resp BP MAP (mmHg) SpO2   05/10/23 15:49:14 97 9 °F (36 6 °C) 89 18 161/86 111 97 %   05/10/23 07:23:51 97 4 °F (36 3 °C)   Abnormal  89 17 159/82 108 92 %   05/09/23 21:34:56 98 2 °F (36 8 °C) 99 -- 168/91 117 94 %   05/09/23 15:25:44 97 4 °F (36 3 °C)   Abnormal  91 -- 133/78 96 95 %   05/09/23 08:18:52 -- 89 -- 154/89 111 95 %           Pertinent Labs/Diagnostic Results:       Results from last 7 days   Lab Units 05/10/23  0538 05/08/23  1440   WBC Thousand/uL 9 34 10 12   HEMOGLOBIN g/dL 10 0* 10 3*   HEMATOCRIT % 32 6* 34 6*   PLATELETS Thousands/uL 304 323   NEUTROS ABS Thousands/µL 5 23 6 76         Results from last 7 days   Lab Units 05/10/23  0538 05/08/23  1440   SODIUM mmol/L 138 143   POTASSIUM mmol/L 3 5 3 5   CHLORIDE mmol/L 112* 115*   CO2 mmol/L 23 26   ANION GAP mmol/L 3* 2*   BUN mg/dL 13 22   CREATININE mg/dL 0 92 1 01   EGFR ml/min/1 73sq m 66 58   CALCIUM mg/dL 8 9 8 9         Results from last 7 days   Lab Units 05/10/23  1040 05/10/23  0608 05/09/23  2048 05/09/23  1557 05/09/23  1118 05/09/23  0632 05/08/23  2146 05/08/23  1708   POC GLUCOSE mg/dl 219* 173* 243* 219* 195* 163* 152* 178*     Results from last 7 days   Lab Units 05/10/23  0538 05/08/23  1440   GLUCOSE RANDOM mg/dL 171* 156*             BETA-HYDROXYBUTYRATE   Date Value Ref Range Status   02/28/2023 1 4 (H) <0 6 mmol/L Final   02/09/2023 0 2 <0 6 mmol/L Final   03/05/2020 0 1 <0 6 mmol/L Final         Scheduled Medications:    atorvastatin, 40 mg, Oral, Daily  budesonide-formoterol, 1 puff, Inhalation, BID  cholecalciferol, 2,000 Units, Oral, Daily  docusate sodium, 100 mg, Oral, BID  gabapentin, 100 mg, Oral, BID  heparin (porcine), 7,500 Units, Subcutaneous, Q8H RONI  insulin glargine, 15 Units, Subcutaneous, HS  insulin lispro, 2-12 Units, Subcutaneous, TID AC  losartan, 100 mg, Oral, Daily  metFORMIN, 1,000 mg, Oral, BID With Meals  predniSONE, 4 mg, Oral, Daily  topiramate, 100 mg, Oral, HS      Continuous IV Infusions:     PRN Meds:       Discharge Plan: to be determined     Network Utilization Review Department  ATTENTION: Please call with any questions or concerns to [phone] and carefully listen to the prompts so that you are directed to the right person  All voicemails are confidential   Montserrat Matos all requests for admission clinical reviews, approved or denied determinations and any other requests to dedicated fax number below belonging to the campus where the patient is receiving treatment   List of dedicated fax numbers for the Facilities:  [address] DENIALS (Administrative/Medical Necessity) [phone]   [address] (Maternity/NICU/Pediatrics) [phone]   3 Lisa Moran [phone]   [address] [phone]   [address] [address] [facility] [facility] [phone]   1555 [facility] 134 [address] [phone]

## 2023-05-11 LAB
ANION GAP SERPL CALCULATED.3IONS-SCNC: 4 MMOL/L (ref 4–13)
BASOPHILS # BLD AUTO: 0.05 THOUSANDS/ÂΜL (ref 0–0.1)
BASOPHILS NFR BLD AUTO: 1 % (ref 0–1)
BUN SERPL-MCNC: 13 MG/DL (ref 5–25)
CALCIUM SERPL-MCNC: 9.4 MG/DL (ref 8.3–10.1)
CHLORIDE SERPL-SCNC: 112 MMOL/L (ref 96–108)
CO2 SERPL-SCNC: 23 MMOL/L (ref 21–32)
CREAT SERPL-MCNC: 0.92 MG/DL (ref 0.6–1.3)
EOSINOPHIL # BLD AUTO: 0.42 THOUSAND/ÂΜL (ref 0–0.61)
EOSINOPHIL NFR BLD AUTO: 4 % (ref 0–6)
ERYTHROCYTE [DISTWIDTH] IN BLOOD BY AUTOMATED COUNT: 18.9 % (ref 11.6–15.1)
GFR SERPL CREATININE-BSD FRML MDRD: 66 ML/MIN/1.73SQ M
GLUCOSE SERPL-MCNC: 152 MG/DL (ref 65–140)
GLUCOSE SERPL-MCNC: 154 MG/DL (ref 65–140)
GLUCOSE SERPL-MCNC: 162 MG/DL (ref 65–140)
GLUCOSE SERPL-MCNC: 203 MG/DL (ref 65–140)
GLUCOSE SERPL-MCNC: 204 MG/DL (ref 65–140)
HCT VFR BLD AUTO: 35.4 % (ref 34.8–46.1)
HGB BLD-MCNC: 10.5 G/DL (ref 11.5–15.4)
IMM GRANULOCYTES # BLD AUTO: 0.07 THOUSAND/UL (ref 0–0.2)
IMM GRANULOCYTES NFR BLD AUTO: 1 % (ref 0–2)
LYMPHOCYTES # BLD AUTO: 2.74 THOUSANDS/ÂΜL (ref 0.6–4.47)
LYMPHOCYTES NFR BLD AUTO: 26 % (ref 14–44)
MCH RBC QN AUTO: 22.9 PG (ref 26.8–34.3)
MCHC RBC AUTO-ENTMCNC: 29.7 G/DL (ref 31.4–37.4)
MCV RBC AUTO: 77 FL (ref 82–98)
MONOCYTES # BLD AUTO: 0.7 THOUSAND/ÂΜL (ref 0.17–1.22)
MONOCYTES NFR BLD AUTO: 7 % (ref 4–12)
NEUTROPHILS # BLD AUTO: 6.48 THOUSANDS/ÂΜL (ref 1.85–7.62)
NEUTS SEG NFR BLD AUTO: 61 % (ref 43–75)
NRBC BLD AUTO-RTO: 0 /100 WBCS
PLATELET # BLD AUTO: 337 THOUSANDS/UL (ref 149–390)
PMV BLD AUTO: 11.2 FL (ref 8.9–12.7)
POTASSIUM SERPL-SCNC: 3.5 MMOL/L (ref 3.5–5.3)
RBC # BLD AUTO: 4.58 MILLION/UL (ref 3.81–5.12)
SARS-COV-2 RNA RESP QL NAA+PROBE: NEGATIVE
SODIUM SERPL-SCNC: 139 MMOL/L (ref 135–147)
WBC # BLD AUTO: 10.46 THOUSAND/UL (ref 4.31–10.16)

## 2023-05-11 RX ORDER — INSULIN GLARGINE 100 [IU]/ML
22 INJECTION, SOLUTION SUBCUTANEOUS
Status: DISCONTINUED | OUTPATIENT
Start: 2023-05-11 | End: 2023-05-12

## 2023-05-11 RX ORDER — AMLODIPINE BESYLATE 5 MG/1
5 TABLET ORAL DAILY
Status: DISCONTINUED | OUTPATIENT
Start: 2023-05-11 | End: 2023-05-12

## 2023-05-11 RX ADMIN — GABAPENTIN 100 MG: 100 CAPSULE ORAL at 17:16

## 2023-05-11 RX ADMIN — METFORMIN HYDROCHLORIDE 1000 MG: 500 TABLET ORAL at 17:16

## 2023-05-11 RX ADMIN — DOCUSATE SODIUM 100 MG: 100 CAPSULE, LIQUID FILLED ORAL at 17:16

## 2023-05-11 RX ADMIN — BUDESONIDE AND FORMOTEROL FUMARATE DIHYDRATE 1 PUFF: 160; 4.5 AEROSOL RESPIRATORY (INHALATION) at 17:17

## 2023-05-11 RX ADMIN — BNT162B2 ORIGINAL AND OMICRON BA.4/BA.5 0.3 ML: .024; .024 INJECTION, SUSPENSION INTRAMUSCULAR at 17:11

## 2023-05-11 RX ADMIN — METFORMIN HYDROCHLORIDE 1000 MG: 500 TABLET ORAL at 09:43

## 2023-05-11 RX ADMIN — BUDESONIDE AND FORMOTEROL FUMARATE DIHYDRATE 1 PUFF: 160; 4.5 AEROSOL RESPIRATORY (INHALATION) at 09:55

## 2023-05-11 RX ADMIN — INSULIN LISPRO 2 UNITS: 100 INJECTION, SOLUTION INTRAVENOUS; SUBCUTANEOUS at 11:50

## 2023-05-11 RX ADMIN — AMLODIPINE BESYLATE 5 MG: 5 TABLET ORAL at 09:43

## 2023-05-11 RX ADMIN — HEPARIN SODIUM 7500 UNITS: 5000 INJECTION INTRAVENOUS; SUBCUTANEOUS at 05:14

## 2023-05-11 RX ADMIN — Medication 2000 UNITS: at 09:42

## 2023-05-11 RX ADMIN — TOPIRAMATE 100 MG: 100 TABLET, FILM COATED ORAL at 22:25

## 2023-05-11 RX ADMIN — INSULIN LISPRO 4 UNITS: 100 INJECTION, SOLUTION INTRAVENOUS; SUBCUTANEOUS at 17:11

## 2023-05-11 RX ADMIN — INSULIN GLARGINE 22 UNITS: 100 INJECTION, SOLUTION SUBCUTANEOUS at 22:25

## 2023-05-11 RX ADMIN — ATORVASTATIN CALCIUM 40 MG: 40 TABLET, FILM COATED ORAL at 09:43

## 2023-05-11 RX ADMIN — HEPARIN SODIUM 7500 UNITS: 5000 INJECTION INTRAVENOUS; SUBCUTANEOUS at 22:25

## 2023-05-11 RX ADMIN — GABAPENTIN 100 MG: 100 CAPSULE ORAL at 09:42

## 2023-05-11 RX ADMIN — DOCUSATE SODIUM 100 MG: 100 CAPSULE, LIQUID FILLED ORAL at 09:42

## 2023-05-11 RX ADMIN — HEPARIN SODIUM 7500 UNITS: 5000 INJECTION INTRAVENOUS; SUBCUTANEOUS at 15:00

## 2023-05-11 RX ADMIN — INSULIN LISPRO 2 UNITS: 100 INJECTION, SOLUTION INTRAVENOUS; SUBCUTANEOUS at 09:41

## 2023-05-11 RX ADMIN — PREDNISONE 4 MG: 1 TABLET ORAL at 09:44

## 2023-05-11 RX ADMIN — LOSARTAN POTASSIUM 100 MG: 50 TABLET, FILM COATED ORAL at 09:42

## 2023-05-11 NOTE — PROGRESS NOTES
PROGRESS NOTE - Family Medicine Residency [place] 1960, 57 y o  female  MRN: [id number]    Unit/Bed#: -01 Encounter: [id number]  Primary Care Provider: Titi Maki MD      Admission Date: 5/8/2023  Length of Stay: 2 days  Code Status:  Level 1 - Full Code  Diet: Diet Adria/CHO Controlled; Consistent Carbohydrate Diet Level 2 (5 carb servings/75 grams CHO/meal)  Consult:   IP CONSULT TO CASE MANAGEMENT  IP CONSULT TO PODIATRY      Assessment/Plan :     Discussed with [facility] team and finalization is pending attending physician attestation  * Ambulatory dysfunction  Assessment & Plan  Patient presented to the ED for open blisters of the feet bilaterally, which were draining  History of uncontrolled diabetes, neuropathy, homelessness which is affecting her care  Patient was recently discharged from rehab last week  On physical exam, blisters do not appear to be infected  Vitals and labs otherwise WNL    Plan:  · PT/OT referral - recommend STR  · Podiatry consulted - WBAT, wound care  · CM referral  · Fall precautions     Homelessness  Assessment & Plan  Socially complex history, patient and her son became homeless back in February and the patient has had multiple ED visits, admissions to both hospital and STRs  Last discharged from rehab this past week, has been homeless since     Affecting her access to care and medications  · CM referral placed  · Patient would maybe benefit from pill packs on discharge given her blindness    Uncontrolled type 2 diabetes mellitus with hyperglycemia [facility])  Assessment & Plan  Lab Results   Component Value Date    HGBA1C 11 2 (H) 04/19/2023       Recent Labs     05/10/23  1630 05/10/23  2047 05/11/23  0556 05/11/23  1048   POCGLU 212* 243* 152* 162*       Blood Sugar Average: Last 72 hrs:  (P) 036 [id number]   Uncontrolled DM with diabetic retinopathy (legally blind), CKD stage III  Home regimen: Lantus 25 units twice daily, Humalog 5 units 3 times daily with meals  Has not been on her diabetes medications since discharge from recent rehab  · Will increase Lantus from 15 to 22 units qhs   · Continue SSI with glucose checks   · Continue Metformin 1000mg BID, tolerating well    Stage 3a chronic kidney disease [facility])  Assessment & Plan  Lab Results   Component Value Date    EGFR 66 05/11/2023    EGFR 66 05/10/2023    EGFR 58 05/08/2023    CREATININE 0 92 05/11/2023    CREATININE 0 92 05/10/2023    CREATININE 1 01 05/08/2023     Stable renal function  Will continue to monitor     Hypertension  Assessment & Plan  Home regimen: Amlodipine 5mg daily, Losartan 100mg daily, Lopressor 100mg q12hrs, Lasix 20mg BID, Hydralazine 10mg TID  Was not taking her BP meds prior to admission due to multiple factors - homelessness, difficulty reading small print on pill bottles  Blood pressures still elevated in the 150s  · Continue Losartan 100mg daily   · Added amlodipine 5mg daily     Friction blisters of the soles  Assessment & Plan  Stable on exam, does not appear infected  Also has history of uncontrolled DM and neuropathy  Local wound care  Podiatry following    Dyslipidemia  Assessment & Plan  Continue Lipitor 40mg daily    Diabetic neuropathy [facility])  Assessment & Plan  Lab Results   Component Value Date    HGBA1C 11 2 (H) 04/19/2023       Recent Labs     05/10/23  1630 05/10/23  2047 05/11/23  0556 05/11/23  1048   POCGLU 212* 243* 152* 162*       Blood Sugar Average: Last 72 hrs:  (P) 475 [id number]     Continue PTA Gabapentin 100mg BID    PMR (polymyalgia rheumatica) ([facility])  Assessment & Plan  Home regimen: prednisone 4mg daily, continue  Will need to continue outpatient follow-up    Seizures ([facility] [address] )  Assessment & Plan  Hx of seizures and migraines  Continue PTA Topamax 100mg qhs      Principal Problem:    Ambulatory dysfunction  Active Problems:    Homelessness    Uncontrolled type 2 diabetes mellitus with hyperglycemia ([facility])    Stage 3a chronic kidney disease ([facility] [address] )    Hypertension    Seizures (HCC)    PMR (polymyalgia rheumatica) (HCC)    Diabetic neuropathy (HCC)    Dyslipidemia    Friction blisters of the soles      VTE Pharm PPX: Heparin  VTE Mech PPX: sequential compression device and/or foot pump applied unless otherwise contraindicated       Hospital Course & 24hr events:     Overnight/24hr events:  Patient without acute events overnight per sign-out  No concern or report per nursing staff  Patient seen and examined at bedside and without questions or concerns  Subjective  & Review of Systems:     Review of Systems   Constitutional: Negative for chills and fever  HENT: Negative for ear pain and sore throat  Eyes: Negative for pain and visual disturbance  Respiratory: Negative for cough and shortness of breath  Cardiovascular: Negative for chest pain and palpitations  Gastrointestinal: Negative for abdominal pain and vomiting  Genitourinary: Negative for dysuria and hematuria  Musculoskeletal: Negative for arthralgias and back pain  Skin: Negative for color change and rash  Neurological: Negative for seizures and syncope  All other systems reviewed and are negative  Objective :     Vitals:    Vitals:    05/10/23 1549 05/10/23 2202 05/10/23 2230 05/11/23 0747   BP: 161/86 (!) 174/100 140/90 170/96   Pulse: 89 95  92   Resp: 18   18   Temp: 97 9 °F (36 6 °C) 98 °F (36 7 °C)  97 8 °F (36 6 °C)   TempSrc:       SpO2: 97% 96%  96%     Temp:  [97 8 °F (36 6 °C)-98 °F (36 7 °C)] 97 8 °F (36 6 °C)  HR:  [89-95] 92  Resp:  [18] 18  BP: (140-174)/() 170/96  Weight (last 2 days)     None        No intake or output data in the 24 hours ending 05/11/23 1222  Invasive Devices     Peripheral Intravenous Line  Duration           Peripheral IV 05/08/23 Left Antecubital 2 days                Labs: I have personally reviewed pertinent reports        Results from last 7 days   Lab Units 05/11/23  0511 05/10/23  0538 05/08/23  1440   WBC Thousand/uL 10 46* 9 34 10 12   HEMOGLOBIN g/dL 10 5* 10 0* 10 3*   HEMATOCRIT % 35 4 32 6* 34 6*   PLATELETS Thousands/uL 337 304 323   NEUTROS ABS Thousands/µL 6 48 5 23 6 76       Results from last 7 days   Lab Units 05/11/23  0511 05/10/23  0538 05/08/23  1440   POTASSIUM mmol/L 3 5 3 5 3 5   CHLORIDE mmol/L 112* 112* 115*   CO2 mmol/L 23 23 26   BUN mg/dL 13 13 22   CREATININE mg/dL 0 92 0 92 1 01   CALCIUM mg/dL 9 4 8 9 8 9   EGFR ml/min/1 73sq m 66 66 58               EKG, Pathology, Imaging, and Other Studies:     Lab Results   Component Value Date/Time    Blood Culture No Growth After 5 Days  04/11/2023 06:48 PM    Blood Culture No Growth After 5 Days  04/11/2023 06:40 PM    Urine Culture >100,000 cfu/ml Candida glabrata (A) 02/28/2023 08:10 PM    Urine Culture 30,000-39,000 cfu/ml 02/28/2023 08:10 PM    C difficile toxin by PCR Negative 03/06/2023 07:52 PM       No results found        Inpatient medications:     Current Facility-Administered Medications   Medication Dose Route Frequency   • amLODIPine (NORVASC) tablet 5 mg  5 mg Oral Daily   • atorvastatin (LIPITOR) tablet 40 mg  40 mg Oral Daily   • budesonide-formoterol (SYMBICORT) 160-4 5 mcg/act inhaler 1 puff  1 puff Inhalation BID   • cholecalciferol (VITAMIN D3) tablet 2,000 Units  2,000 Units Oral Daily   • docusate sodium (COLACE) capsule 100 mg  100 mg Oral BID   • gabapentin (NEURONTIN) capsule 100 mg  100 mg Oral BID   • heparin (porcine) subcutaneous injection 7,500 Units  7,500 Units Subcutaneous Q8H [address]   • insulin glargine (LANTUS) subcutaneous injection 15 Units 0 15 mL  15 Units Subcutaneous HS   • insulin lispro (HumaLOG) 100 units/mL subcutaneous injection 2-12 Units  2-12 Units Subcutaneous TID AC   • losartan (COZAAR) tablet 100 mg  100 mg Oral Daily   • metFORMIN (GLUCOPHAGE) tablet 1,000 mg  1,000 mg Oral BID With Meals   • predniSONE tablet 4 mg  4 mg Oral Daily   • topiramate (TOPAMAX) tablet 100 mg  100 mg Oral HS         Physical Exam :     Physical Exam  Vitals reviewed  Constitutional:       General: She is not in acute distress  Appearance: Normal appearance  HENT:      Head: Normocephalic and atraumatic  Nose: Nose normal    Eyes:      Extraocular Movements: Extraocular movements intact  Conjunctiva/sclera: Conjunctivae normal    Cardiovascular:      Rate and Rhythm: Normal rate and regular rhythm  Heart sounds: Normal heart sounds  No murmur heard  Pulmonary:      Effort: Pulmonary effort is normal  No respiratory distress  Breath sounds: Normal breath sounds  Abdominal:      General: Abdomen is flat  Palpations: Abdomen is soft  Tenderness: There is no abdominal tenderness  Musculoskeletal:         General: Normal range of motion  Cervical back: Normal range of motion  Neurological:      Mental Status: She is alert     Psychiatric:         Mood and Affect: Mood normal          Behavior: Behavior normal                Lizzie Hendricks MD  PGY-2, Family Medicine  05/11/23  12:22 PM

## 2023-05-11 NOTE — UTILIZATION REVIEW
"NOTIFICATION OF INPATIENT ADMISSION   AUTHORIZATION REQUEST   SERVICING FACILITY:   [facility]  Address: [address] [place] [address]  Tax ID: [id number]  NPI: [id number] ATTENDING PROVIDER:  Attending Name and NPI#: Nadine Warren Md [[id number]]  Address: [address] [place] [address]  Phone: [phone]   ADMISSION INFORMATION:  Place of Service: Inpatient [address] [facility]  [address]  Place of Service Code: 21  Inpatient Admission Date/Time: 5/9/23  2:45 PM  Discharge Date/Time: No discharge date for patient encounter  Admitting Diagnosis Code/Description:  Homelessness [Z59 00]  Open wound of left heel, initial encounter [S91 302A]  Uncontrolled type 2 diabetes mellitus with hyperglycemia ([facility] [address] ) [E11 65]     UTILIZATION REVIEW CONTACT:  David Torres, Utilization   Network Utilization Review Department  Phone: [phone]  Fax: [phone]  Email: [email or web address]  Contact for approvals/pending authorizations, clinical reviews, and discharge  PHYSICIAN ADVISORY SERVICES:  Medical Necessity Denial & [employer] Review  Phone: [phone]  Fax: [phone]  Email: [email or web address]         "

## 2023-05-11 NOTE — CASE MANAGEMENT
Case Management Discharge Planning Note    Patient name Raghu Myers  Location [address] 771/-43 MRN [id number]  : 1960 Date 2023       Current Admission Date: 2023  Current Admission Diagnosis:Ambulatory dysfunction   Patient Active Problem List    Diagnosis Date Noted   • Friction blisters of the soles 2023   • Calculus of gallbladder without cholecystitis without obstruction 2023   • Sepsis ([place] [address] ) 2023   • Diabetic foot ulcers ([place] [address] ) 2023   • Abnormal CT scan 2023   • Suspected pressure injury of deep tissue 03/15/2023   • Dyslipidemia 2023   • Insulin long-term use ([place] [address] ) 2023   • Type 2 diabetes mellitus with hyperglycemia ([place] [address] ) 2023   • Ambulatory dysfunction 2023   • Legally blind 2023   • Diarrhea 2023   • Bilateral lower extremity edema 2023   • Homelessness 2023   • Abnormal urinalysis 2023   • Weakness 2023   • Intertrigo 2023   • Leg numbness 2023   • Unsteadiness on feet 2022   • Chronic migraine without aura, not intractable, without status migrainosus 2022   • Obstructive sleep apnea 2022   • Diabetic neuropathy ([place] [address] ) 2022   • Stage 3a chronic kidney disease ([place] [address] ) 2022   • Polymyalgia rheumatica ([place] [address] ) 2021   • Gait instability 2021   • Ulnar neuropathy of right upper extremity    • Blurry vision, bilateral 2021   • Depressed mood 10/08/2020   • Type 2 diabetes mellitus with other specified complication ([place] [address] ) 7551   • Hypertension 05/10/2020   • Chronic migraine without aura without status migrainosus, not intractable 2020   • Hypersomnia 2020   • Migraine without aura and without status migrainosus, not intractable 2020   • Cerebral aneurysm without rupture 2020   • History of absence seizures 2020   • Thyroid nodule 2020   • Aneurysm ([facility] [address] ) 2020   • Type 2 diabetes mellitus with hyperglycemia, with long-term current use of insulin ([facility] [address] ) 02/21/2020   • Left cavernous carotid aneurysm 02/10/2020   • Polyneuropathy associated with underlying disease ([facility] [address] ) 01/07/2020   • PMR (polymyalgia rheumatica) ([facility] [address] ) 01/07/2020   • Thrombocytosis 01/07/2020   • Morbid obesity ([facility] [address] ) 09/19/2019   • Other inflammatory and immune myopathies, not elsewhere classified 09/16/2019   • Transaminitis 09/16/2019   • Frequent headaches 07/09/2019   • Type 2 diabetes mellitus with microalbuminuria, with long-term current use of insulin ([facility] ) 02/01/2019   • Mild intermittent asthma without complication 03/95/7492   • Orthostatic hypotension 06/25/2018   • Lung nodules 03/22/2018   • Exertional dyspnea 02/13/2018   • Enlarged pulmonary artery ([facility] [facility] ) 02/13/2018   • Aortic dilatation ([facility] ) 02/13/2018   • Uncontrolled type 2 diabetes mellitus with hyperglycemia ([place] [address] )    • Seizures ([place] [address] )    • Obstructive sleep apnea (adult) (pediatric) 12/18/2017   • Prolonged QT interval 12/18/2017   • Decreased pulses in feet 12/11/2017   • Microalbuminuria 09/08/2015   • Vitamin D deficiency 06/06/2014   • Visual impairment in both eyes 12/06/2012   • Anemia 03/20/2012   • Hyperlipidemia 03/20/2012   • Class 3 severe obesity with serious comorbidity and body mass index (BMI) of 50 0 to 59 9 in adult [facility]) 03/20/2012   • Neuropathy of hand, right 03/20/2012      LOS (days): 2  Geometric Mean LOS (GMLOS) (days): 2 90  Days to GMLOS:0 8     OBJECTIVE:  Risk of Unplanned Readmission Score: 30 96         Current admission status: Inpatient   Preferred Pharmacy:   [address] [facility], [address] [place] [address]  Phone: [phone] Fax: [phone]    [facility] ([address] [place] [address],   [address] [place] [address] [address] [address]  Phone: [phone] Fax: [phone]    Primary Care Provider: Baron Gage MD    Primary Insurance: [facility] [place] DUAL COMPLETE [facility]OS[facility] REP  Secondary Insurance: [facility]    DISCHARGE DETAILS:    Discharge planning discussed with[de-identified] PT  Freedom of Choice: Yes        Other Referral/Resources/Interventions Provided:  Interventions: KishantBertha (Homeless resources)  Referral Comments: [facility] Birth application given, Hotel/room for retn list given, soup kitchen list given, shelter list given         Treatment Team Recommendation: Short Term Rehab  Discharge Destination Plan[de-identified] Short Term Rehab      IMM Given (Date):: 05/11/23  IMM Given to[de-identified] Patient        1627pm   Shruthi from [facility] called         Cannot accept today

## 2023-05-12 LAB
ANION GAP SERPL CALCULATED.3IONS-SCNC: 4 MMOL/L (ref 4–13)
BASOPHILS # BLD AUTO: 0.07 THOUSANDS/ÂΜL (ref 0–0.1)
BASOPHILS NFR BLD AUTO: 1 % (ref 0–1)
BUN SERPL-MCNC: 13 MG/DL (ref 5–25)
CALCIUM SERPL-MCNC: 9.9 MG/DL (ref 8.3–10.1)
CHLORIDE SERPL-SCNC: 111 MMOL/L (ref 96–108)
CO2 SERPL-SCNC: 22 MMOL/L (ref 21–32)
CREAT SERPL-MCNC: 1.23 MG/DL (ref 0.6–1.3)
EOSINOPHIL # BLD AUTO: 0.38 THOUSAND/ÂΜL (ref 0–0.61)
EOSINOPHIL NFR BLD AUTO: 4 % (ref 0–6)
ERYTHROCYTE [DISTWIDTH] IN BLOOD BY AUTOMATED COUNT: 19.4 % (ref 11.6–15.1)
GFR SERPL CREATININE-BSD FRML MDRD: 46 ML/MIN/1.73SQ M
GLUCOSE SERPL-MCNC: 105 MG/DL (ref 65–140)
GLUCOSE SERPL-MCNC: 176 MG/DL (ref 65–140)
GLUCOSE SERPL-MCNC: 182 MG/DL (ref 65–140)
GLUCOSE SERPL-MCNC: 189 MG/DL (ref 65–140)
GLUCOSE SERPL-MCNC: 189 MG/DL (ref 65–140)
HCT VFR BLD AUTO: 35.8 % (ref 34.8–46.1)
HGB BLD-MCNC: 11 G/DL (ref 11.5–15.4)
IMM GRANULOCYTES # BLD AUTO: 0.12 THOUSAND/UL (ref 0–0.2)
IMM GRANULOCYTES NFR BLD AUTO: 1 % (ref 0–2)
LYMPHOCYTES # BLD AUTO: 2.16 THOUSANDS/ÂΜL (ref 0.6–4.47)
LYMPHOCYTES NFR BLD AUTO: 20 % (ref 14–44)
MCH RBC QN AUTO: 23.5 PG (ref 26.8–34.3)
MCHC RBC AUTO-ENTMCNC: 30.7 G/DL (ref 31.4–37.4)
MCV RBC AUTO: 76 FL (ref 82–98)
MONOCYTES # BLD AUTO: 0.69 THOUSAND/ÂΜL (ref 0.17–1.22)
MONOCYTES NFR BLD AUTO: 6 % (ref 4–12)
NEUTROPHILS # BLD AUTO: 7.58 THOUSANDS/ÂΜL (ref 1.85–7.62)
NEUTS SEG NFR BLD AUTO: 68 % (ref 43–75)
NRBC BLD AUTO-RTO: 0 /100 WBCS
PLATELET # BLD AUTO: 366 THOUSANDS/UL (ref 149–390)
PMV BLD AUTO: 11.5 FL (ref 8.9–12.7)
POTASSIUM SERPL-SCNC: 4.2 MMOL/L (ref 3.5–5.3)
RBC # BLD AUTO: 4.69 MILLION/UL (ref 3.81–5.12)
SODIUM SERPL-SCNC: 137 MMOL/L (ref 135–147)
WBC # BLD AUTO: 11 THOUSAND/UL (ref 4.31–10.16)

## 2023-05-12 RX ORDER — INSULIN GLARGINE 100 [IU]/ML
26 INJECTION, SOLUTION SUBCUTANEOUS
Status: DISCONTINUED | OUTPATIENT
Start: 2023-05-12 | End: 2023-05-18 | Stop reason: HOSPADM

## 2023-05-12 RX ORDER — AMLODIPINE BESYLATE 10 MG/1
10 TABLET ORAL DAILY
Status: DISCONTINUED | OUTPATIENT
Start: 2023-05-12 | End: 2023-05-18 | Stop reason: HOSPADM

## 2023-05-12 RX ADMIN — ATORVASTATIN CALCIUM 40 MG: 40 TABLET, FILM COATED ORAL at 09:04

## 2023-05-12 RX ADMIN — HEPARIN SODIUM 7500 UNITS: 5000 INJECTION INTRAVENOUS; SUBCUTANEOUS at 07:10

## 2023-05-12 RX ADMIN — TOPIRAMATE 100 MG: 100 TABLET, FILM COATED ORAL at 22:23

## 2023-05-12 RX ADMIN — GABAPENTIN 100 MG: 100 CAPSULE ORAL at 18:12

## 2023-05-12 RX ADMIN — DOCUSATE SODIUM 100 MG: 100 CAPSULE, LIQUID FILLED ORAL at 18:12

## 2023-05-12 RX ADMIN — BUDESONIDE AND FORMOTEROL FUMARATE DIHYDRATE 1 PUFF: 160; 4.5 AEROSOL RESPIRATORY (INHALATION) at 18:14

## 2023-05-12 RX ADMIN — BUDESONIDE AND FORMOTEROL FUMARATE DIHYDRATE 1 PUFF: 160; 4.5 AEROSOL RESPIRATORY (INHALATION) at 09:04

## 2023-05-12 RX ADMIN — INSULIN GLARGINE 26 UNITS: 100 INJECTION, SOLUTION SUBCUTANEOUS at 22:27

## 2023-05-12 RX ADMIN — METOPROLOL TARTRATE 25 MG: 25 TABLET, FILM COATED ORAL at 10:56

## 2023-05-12 RX ADMIN — METOPROLOL TARTRATE 25 MG: 25 TABLET, FILM COATED ORAL at 22:23

## 2023-05-12 RX ADMIN — INSULIN LISPRO 2 UNITS: 100 INJECTION, SOLUTION INTRAVENOUS; SUBCUTANEOUS at 18:12

## 2023-05-12 RX ADMIN — HEPARIN SODIUM 7500 UNITS: 5000 INJECTION INTRAVENOUS; SUBCUTANEOUS at 14:53

## 2023-05-12 RX ADMIN — METFORMIN HYDROCHLORIDE 1000 MG: 500 TABLET ORAL at 18:12

## 2023-05-12 RX ADMIN — HEPARIN SODIUM 7500 UNITS: 5000 INJECTION INTRAVENOUS; SUBCUTANEOUS at 22:23

## 2023-05-12 RX ADMIN — INSULIN LISPRO 2 UNITS: 100 INJECTION, SOLUTION INTRAVENOUS; SUBCUTANEOUS at 12:11

## 2023-05-12 RX ADMIN — Medication 2000 UNITS: at 09:04

## 2023-05-12 RX ADMIN — GABAPENTIN 100 MG: 100 CAPSULE ORAL at 09:04

## 2023-05-12 RX ADMIN — LOSARTAN POTASSIUM 100 MG: 50 TABLET, FILM COATED ORAL at 09:04

## 2023-05-12 RX ADMIN — METFORMIN HYDROCHLORIDE 1000 MG: 500 TABLET ORAL at 09:04

## 2023-05-12 RX ADMIN — DOCUSATE SODIUM 100 MG: 100 CAPSULE, LIQUID FILLED ORAL at 09:04

## 2023-05-12 RX ADMIN — AMLODIPINE BESYLATE 10 MG: 10 TABLET ORAL at 09:04

## 2023-05-12 RX ADMIN — PREDNISONE 4 MG: 1 TABLET ORAL at 09:04

## 2023-05-12 NOTE — CASE MANAGEMENT
Case Management Discharge Planning Note    Patient name Hurman Homans  Location [address] 771/-87 MRN [id number]  : 1960 Date 2023       Current Admission Date: 2023  Current Admission Diagnosis:Ambulatory dysfunction   Patient Active Problem List    Diagnosis Date Noted   • Friction blisters of the soles 2023   • Calculus of gallbladder without cholecystitis without obstruction 2023   • Sepsis ([place] [address] ) 2023   • Diabetic foot ulcers ([place] [address] ) 2023   • Abnormal CT scan 2023   • Suspected pressure injury of deep tissue 03/15/2023   • Dyslipidemia 2023   • Insulin long-term use ([place] [address] ) 2023   • Type 2 diabetes mellitus with hyperglycemia ([place] [address] ) 2023   • Ambulatory dysfunction 2023   • Legally blind 2023   • Diarrhea 2023   • Bilateral lower extremity edema 2023   • Homelessness 2023   • Abnormal urinalysis 2023   • Weakness 2023   • Intertrigo 2023   • Leg numbness 2023   • Unsteadiness on feet 2022   • Chronic migraine without aura, not intractable, without status migrainosus 2022   • Obstructive sleep apnea 2022   • Diabetic neuropathy ([place] [address] ) 2022   • Stage 3a chronic kidney disease ([place] [address] ) 2022   • Polymyalgia rheumatica ([place] [address] ) 2021   • Gait instability 2021   • Ulnar neuropathy of right upper extremity    • Blurry vision, bilateral 2021   • Depressed mood 10/08/2020   • Type 2 diabetes mellitus with other specified complication ([place] [address] )    • Hypertension 05/10/2020   • Chronic migraine without aura without status migrainosus, not intractable 2020   • Hypersomnia 2020   • Migraine without aura and without status migrainosus, not intractable 2020   • Cerebral aneurysm without rupture 2020   • History of absence seizures 2020   • Thyroid nodule 2020   • Aneurysm ([facility] [address] [facility] ) 2020   • Type 2 diabetes mellitus with hyperglycemia, with long-term current use of insulin [facility] ) 02/21/2020   • Left cavernous carotid aneurysm 02/10/2020   • Polyneuropathy associated with underlying disease ([facility] [address] ) 01/07/2020   • PMR (polymyalgia rheumatica) ([facility] [address] ) 01/07/2020   • Thrombocytosis 01/07/2020   • Morbid obesity ([facility] [facility] ) 09/19/2019   • Other inflammatory and immune myopathies, not elsewhere classified 09/16/2019   • Transaminitis 09/16/2019   • Frequent headaches 07/09/2019   • Type 2 diabetes mellitus with microalbuminuria, with long-term current use of insulin ([facility] ) 02/01/2019   • Mild intermittent asthma without complication 24/22/3337   • Orthostatic hypotension 06/25/2018   • Lung nodules 03/22/2018   • Exertional dyspnea 02/13/2018   • Enlarged pulmonary artery ([facility] ) 02/13/2018   • Aortic dilatation ([facility] ) 02/13/2018   • Uncontrolled type 2 diabetes mellitus with hyperglycemia ([place] [address] )    • Seizures ([place] [address] )    • Obstructive sleep apnea (adult) (pediatric) 12/18/2017   • Prolonged QT interval 12/18/2017   • Decreased pulses in feet 12/11/2017   • Microalbuminuria 09/08/2015   • Vitamin D deficiency 06/06/2014   • Visual impairment in both eyes 12/06/2012   • Anemia 03/20/2012   • Hyperlipidemia 03/20/2012   • Class 3 severe obesity with serious comorbidity and body mass index (BMI) of 50 0 to 59 9 in adult [facility]) 03/20/2012   • Neuropathy of hand, right 03/20/2012      LOS (days): 3  Geometric Mean LOS (GMLOS) (days): 2 90  Days to GMLOS:-0 1     OBJECTIVE:  Risk of Unplanned Readmission Score: 31 27         Current admission status: Inpatient   Preferred Pharmacy:   [address] [place] [address]  Phone: [phone] Fax: [phone]    [facility] ([address] [place] [address],   [address] [place] [address] [address] [address]  Phone: [phone] Fax: [phone]    Primary Care Provider: Vidya Julian MD    Primary Insurance: [facility] [place] DUAL COMPLETE [facility] REP  Secondary Insurance: [facility]    DISCHARGE DETAILS:    Discharge planning discussed with[de-identified] patient     Comments - Freedom of Choice: Patient told CM that she will not be filling out the [facility] application b/c it's too long  CM told the patient that the rent is 30% of her income monthly  Where a normal apartment would cost significantly more money  Pt told CM that she needs help filling out the application but she was reading the application to the CM  CM suspecting that she won't fill it out b/c her son will not be allowed to live with her there  Pt told CM that a friend in another state has offered for her to move there, but her son doesn't want that b/c his father is local to [facility]  CM asked the patient if she looked into [facility] she stated she could not do the steps  CM called [facility] they don't have any available rooms  [facility] [employer] applications are closed  [facility] not accepting any applications  No available Apartments at [facility]

## 2023-05-12 NOTE — PROGRESS NOTES
PROGRESS NOTE - Family Medicine Residency Radha Jimenez 1960, 57 y o  female  MRN: [id number]    Unit/Bed#: -01 Encounter: [id number]  Primary Care Provider: Demetria Castillo MD      Admission Date: 5/8/2023  Length of Stay: 3 days  Code Status:  Level 1 - Full Code  Diet: Diet Adria/CHO Controlled; Consistent Carbohydrate Diet Level 2 (5 carb servings/75 grams CHO/meal)  Consult:   IP CONSULT TO CASE MANAGEMENT  IP CONSULT TO PODIATRY      Assessment/Plan :     Discussed with [facility] team and finalization is pending attending physician attestation  * Ambulatory dysfunction  Assessment & Plan  Patient presented to the ED for open blisters of the feet bilaterally, which were draining  History of uncontrolled diabetes, neuropathy, homelessness which is affecting her care  Patient was recently discharged from rehab last week  On physical exam, blisters do not appear to be infected  Vitals and labs otherwise WNL    Plan:  · PT/OT referral - recommend STR  · Podiatry consulted - WBAT, wound care  · CM referral  · Fall precautions     Homelessness  Assessment & Plan  Socially complex history, patient and her son became homeless back in February and the patient has had multiple ED visits, admissions to both hospital and STRs  Last discharged from rehab this past week, has been homeless since     Affecting her access to care and medications  · CM referral placed  · Patient would maybe benefit from pill packs on discharge given her blindness    Uncontrolled type 2 diabetes mellitus with hyperglycemia [facility])  Assessment & Plan  Lab Results   Component Value Date    HGBA1C 11 2 (H) 04/19/2023       Recent Labs     05/11/23  1618 05/11/23  2047 05/12/23  0708 05/12/23  1109   POCGLU 203* 204* 105 182*       Blood Sugar Average: Last 72 hrs:  (P) 458 [id number]   Uncontrolled DM with diabetic retinopathy (legally blind), CKD stage III  Home regimen: Lantus 25 units twice daily, Humalog 5 units 3 times daily with meals  Has not been on her diabetes medications since discharge from recent rehab  · Will increase Lantus from 22 to 26 units qhs   · Continue SSI with glucose checks   · Continue Metformin 1000mg BID, tolerating well    Stage 3a chronic kidney disease [facility])  Assessment & Plan  Lab Results   Component Value Date    EGFR 66 05/11/2023    EGFR 66 05/10/2023    EGFR 58 05/08/2023    CREATININE 0 92 05/11/2023    CREATININE 0 92 05/10/2023    CREATININE 1 01 05/08/2023     Stable renal function  Will continue to monitor     Hypertension  Assessment & Plan  Home regimen: Amlodipine 5mg daily, Losartan 100mg daily, Lopressor 100mg q12hrs, Lasix 20mg BID, Hydralazine 10mg TID  Was not taking her BP meds prior to admission due to multiple factors - homelessness, difficulty reading small print on pill bottles  · Continue Losartan 100mg daily   · Increased amlodipine from 5mg to 10mg daily  · Will restart Lopressor at 25mg q12hrs     Friction blisters of the soles  Assessment & Plan  Stable on exam, does not appear infected  Also has history of uncontrolled DM and neuropathy  Local wound care  Podiatry following    Dyslipidemia  Assessment & Plan  Continue Lipitor 40mg daily    Diabetic neuropathy [facility])  Assessment & Plan  Lab Results   Component Value Date    HGBA1C 11 2 (H) 04/19/2023       Recent Labs     05/11/23  1618 05/11/23  2047 05/12/23  0708 05/12/23  1109   POCGLU 203* 204* 105 182*       Blood Sugar Average: Last 72 hrs:  (P) 638 [id number]     Continue PTA Gabapentin 100mg BID    PMR (polymyalgia rheumatica) ([facility])  Assessment & Plan  Home regimen: prednisone 4mg daily, continue  Will need to continue outpatient follow-up    Seizures ([facility] [address] )  Assessment & Plan  Hx of seizures and migraines  Continue PTA Topamax 100mg qhs      Principal Problem:    Ambulatory dysfunction  Active Problems:    Homelessness    Uncontrolled type 2 diabetes mellitus with hyperglycemia ([facility])    Stage 3a chronic kidney disease ([facility] [address] )    Hypertension    Seizures (HCC)    PMR (polymyalgia rheumatica) (HCC)    Diabetic neuropathy (HCC)    Dyslipidemia    Friction blisters of the soles      VTE Pharm PPX: Heparin  VTE Mech PPX: sequential compression device and/or foot pump applied unless otherwise contraindicated       Hospital Course & 24hr events:     Overnight/24hr events:  Patient without acute events overnight per sign-out  No concern or report per nursing staff  Patient seen and examined at bedside and without questions or concerns  Subjective  & Review of Systems:     Review of Systems   Constitutional: Negative for chills and fever  HENT: Negative for ear pain and sore throat  Eyes: Negative for pain and visual disturbance  Respiratory: Negative for cough and shortness of breath  Cardiovascular: Negative for chest pain and palpitations  Gastrointestinal: Negative for abdominal pain and vomiting  Genitourinary: Negative for dysuria and hematuria  Musculoskeletal: Negative for arthralgias and back pain  Skin: Negative for color change and rash  Neurological: Negative for seizures and syncope  All other systems reviewed and are negative  Objective :     Vitals:    Vitals:    05/11/23 0747 05/11/23 1523 05/11/23 2305 05/12/23 0903   BP: 170/96 150/86 (!) 180/104 130/89   Pulse: 92 92 90 100   Resp: 18 20 20    Temp: 97 8 °F (36 6 °C) 97 8 °F (36 6 °C) 97 6 °F (36 4 °C) 97 9 °F (36 6 °C)   TempSrc:       SpO2: 96% 98% 96% 95%     Temp:  [97 6 °F (36 4 °C)-97 9 °F (36 6 °C)] 97 9 °F (36 6 °C)  HR:  [] 100  Resp:  [20] 20  BP: (130-180)/() 130/89  Weight (last 2 days)     None          Intake/Output Summary (Last 24 hours) at 5/12/2023 1300  Last data filed at 5/12/2023 0903  Gross per 24 hour   Intake 780 ml   Output --   Net 780 ml     Invasive Devices     Peripheral Intravenous Line  Duration           Peripheral IV 05/12/23 Right Antecubital <1 day                Labs:   I have personally reviewed pertinent reports  Results from last 7 days   Lab Units 05/11/23  0511 05/10/23  0538 05/08/23  1440   WBC Thousand/uL 10 46* 9 34 10 12   HEMOGLOBIN g/dL 10 5* 10 0* 10 3*   HEMATOCRIT % 35 4 32 6* 34 6*   PLATELETS Thousands/uL 337 304 323   NEUTROS ABS Thousands/µL 6 48 5 23 6 76       Results from last 7 days   Lab Units 05/11/23  0511 05/10/23  0538 05/08/23  1440   POTASSIUM mmol/L 3 5 3 5 3 5   CHLORIDE mmol/L 112* 112* 115*   CO2 mmol/L 23 23 26   BUN mg/dL 13 13 22   CREATININE mg/dL 0 92 0 92 1 01   CALCIUM mg/dL 9 4 8 9 8 9   EGFR ml/min/1 73sq m 66 66 58               EKG, Pathology, Imaging, and Other Studies:     Lab Results   Component Value Date/Time    Blood Culture No Growth After 5 Days  04/11/2023 06:48 PM    Blood Culture No Growth After 5 Days  04/11/2023 06:40 PM    Urine Culture >100,000 cfu/ml Candida glabrata (A) 02/28/2023 08:10 PM    Urine Culture 30,000-39,000 cfu/ml 02/28/2023 08:10 PM    C difficile toxin by PCR Negative 03/06/2023 07:52 PM       No results found        Inpatient medications:     Current Facility-Administered Medications   Medication Dose Route Frequency   • amLODIPine (NORVASC) tablet 10 mg  10 mg Oral Daily   • atorvastatin (LIPITOR) tablet 40 mg  40 mg Oral Daily   • budesonide-formoterol (SYMBICORT) 160-4 5 mcg/act inhaler 1 puff  1 puff Inhalation BID   • cholecalciferol (VITAMIN D3) tablet 2,000 Units  2,000 Units Oral Daily   • docusate sodium (COLACE) capsule 100 mg  100 mg Oral BID   • gabapentin (NEURONTIN) capsule 100 mg  100 mg Oral BID   • heparin (porcine) subcutaneous injection 7,500 Units  7,500 Units Subcutaneous Q8H [address]   • insulin glargine (LANTUS) subcutaneous injection 26 Units 0 26 mL  26 Units Subcutaneous HS   • insulin lispro (HumaLOG) 100 units/mL subcutaneous injection 2-12 Units  2-12 Units Subcutaneous TID AC   • losartan (COZAAR) tablet 100 mg  100 mg Oral Daily   • metFORMIN (GLUCOPHAGE) tablet 1,000 mg  1,000 mg Oral BID With Meals   • metoprolol tartrate (LOPRESSOR) tablet 25 mg  25 mg Oral Q12H [facility]   • predniSONE tablet 4 mg  4 mg Oral Daily   • topiramate (TOPAMAX) tablet 100 mg  100 mg Oral HS         Physical Exam :     Physical Exam  Vitals reviewed  Constitutional:       General: She is not in acute distress  Appearance: Normal appearance  HENT:      Head: Normocephalic and atraumatic  Nose: Nose normal    Eyes:      Extraocular Movements: Extraocular movements intact  Conjunctiva/sclera: Conjunctivae normal    Cardiovascular:      Rate and Rhythm: Normal rate and regular rhythm  Heart sounds: Normal heart sounds  No murmur heard  Pulmonary:      Effort: Pulmonary effort is normal  No respiratory distress  Breath sounds: Normal breath sounds  Abdominal:      General: Abdomen is flat  Palpations: Abdomen is soft  Tenderness: There is no abdominal tenderness  Musculoskeletal:         General: Normal range of motion  Cervical back: Normal range of motion  Neurological:      Mental Status: She is alert and oriented to person, place, and time     Psychiatric:         Mood and Affect: Mood normal          Behavior: Behavior normal                Amie Umanzor MD  PGY-2, Family Medicine  05/12/23  1:00 PM

## 2023-05-12 NOTE — CASE MANAGEMENT
Case Management Discharge Planning Note    Patient name Cezar Menezes  Location [address] [phone] MRN [id number]  : 1960 Date 2023       Current Admission Date: 2023  Current Admission Diagnosis:Ambulatory dysfunction   Patient Active Problem List    Diagnosis Date Noted   • Friction blisters of the soles 2023   • Calculus of gallbladder without cholecystitis without obstruction 2023   • Sepsis ([place] [address] ) 2023   • Diabetic foot ulcers ([place] [address] ) 2023   • Abnormal CT scan 2023   • Suspected pressure injury of deep tissue 03/15/2023   • Dyslipidemia 2023   • Insulin long-term use ([place] [address] ) 2023   • Type 2 diabetes mellitus with hyperglycemia ([place] [address] ) 2023   • Ambulatory dysfunction 2023   • Legally blind 2023   • Diarrhea 2023   • Bilateral lower extremity edema 2023   • Homelessness 2023   • Abnormal urinalysis 2023   • Weakness 2023   • Intertrigo 2023   • Leg numbness 2023   • Unsteadiness on feet 2022   • Chronic migraine without aura, not intractable, without status migrainosus 2022   • Obstructive sleep apnea 2022   • Diabetic neuropathy ([place] [address] ) 2022   • Stage 3a chronic kidney disease ([place] [address] ) 2022   • Polymyalgia rheumatica ([place] [address] ) 2021   • Gait instability 2021   • Ulnar neuropathy of right upper extremity    • Blurry vision, bilateral 2021   • Depressed mood 10/08/2020   • Type 2 diabetes mellitus with other specified complication ([place] [address] )    • Hypertension 05/10/2020   • Chronic migraine without aura without status migrainosus, not intractable 2020   • Hypersomnia 2020   • Migraine without aura and without status migrainosus, not intractable 2020   • Cerebral aneurysm without rupture 2020   • History of absence seizures 2020   • Thyroid nodule 2020   • Aneurysm ([facility] [address] ) 2020   • Type 2 diabetes mellitus with hyperglycemia, with long-term current use of insulin ([facility] [address] ) 02/21/2020   • Left cavernous carotid aneurysm 02/10/2020   • Polyneuropathy associated with underlying disease ([facility] [address] ) 01/07/2020   • PMR (polymyalgia rheumatica) ([facility] [address] ) 01/07/2020   • Thrombocytosis 01/07/2020   • Morbid obesity ([facility] [address] ) 09/19/2019   • Other inflammatory and immune myopathies, not elsewhere classified 09/16/2019   • Transaminitis 09/16/2019   • Frequent headaches 07/09/2019   • Type 2 diabetes mellitus with microalbuminuria, with long-term current use of insulin [facility] ) 02/01/2019   • Mild intermittent asthma without complication 79/80/5293   • Orthostatic hypotension 06/25/2018   • Lung nodules 03/22/2018   • Exertional dyspnea 02/13/2018   • Enlarged pulmonary artery ([facility] [address] ) 02/13/2018   • Aortic dilatation ([facility] ) 02/13/2018   • Uncontrolled type 2 diabetes mellitus with hyperglycemia ([place] [address] )    • Seizures ([place] [address] )    • Obstructive sleep apnea (adult) (pediatric) 12/18/2017   • Prolonged QT interval 12/18/2017   • Decreased pulses in feet 12/11/2017   • Microalbuminuria 09/08/2015   • Vitamin D deficiency 06/06/2014   • Visual impairment in both eyes 12/06/2012   • Anemia 03/20/2012   • Hyperlipidemia 03/20/2012   • Class 3 severe obesity with serious comorbidity and body mass index (BMI) of 50 0 to 59 9 in adult [facility]) 03/20/2012   • Neuropathy of hand, right 03/20/2012      LOS (days): 3  Geometric Mean LOS (GMLOS) (days): 2 90  Days to GMLOS:0 1     OBJECTIVE:  Risk of Unplanned Readmission Score: 30 94         Current admission status: Inpatient   Preferred Pharmacy:   [address] [facility], [address] [place] [address]  Phone: [phone] Fax: [phone]    [facility] ([address] [place] [address],   [address] [place] [address] [address] [address]  Phone: [phone] Fax: [phone]    Primary Care Provider: Rk Mcmahon MD    Primary Insurance: [facility] [place] DUAL COMPLETE [facility] HOSPITAL REP  Secondary Insurance: [facility]    DISCHARGE DETAILS:          Comments - Freedom of Choice: TC to [facility] Admissions  Gus Fabian is off today  Approval from their Saúl Diaz is needed before they can consider this patient  Earliest admssion will be Monday   Gus Fabian is out Friday, Sat, and Sun

## 2023-05-13 LAB
ANION GAP SERPL CALCULATED.3IONS-SCNC: 1 MMOL/L (ref 4–13)
BASOPHILS # BLD AUTO: 0.08 THOUSANDS/ÂΜL (ref 0–0.1)
BASOPHILS NFR BLD AUTO: 1 % (ref 0–1)
BUN SERPL-MCNC: 14 MG/DL (ref 5–25)
CALCIUM SERPL-MCNC: 9.5 MG/DL (ref 8.3–10.1)
CHLORIDE SERPL-SCNC: 114 MMOL/L (ref 96–108)
CO2 SERPL-SCNC: 25 MMOL/L (ref 21–32)
CREAT SERPL-MCNC: 1 MG/DL (ref 0.6–1.3)
EOSINOPHIL # BLD AUTO: 0.43 THOUSAND/ÂΜL (ref 0–0.61)
EOSINOPHIL NFR BLD AUTO: 4 % (ref 0–6)
ERYTHROCYTE [DISTWIDTH] IN BLOOD BY AUTOMATED COUNT: 19.1 % (ref 11.6–15.1)
GFR SERPL CREATININE-BSD FRML MDRD: 59 ML/MIN/1.73SQ M
GLUCOSE SERPL-MCNC: 139 MG/DL (ref 65–140)
GLUCOSE SERPL-MCNC: 139 MG/DL (ref 65–140)
GLUCOSE SERPL-MCNC: 142 MG/DL (ref 65–140)
GLUCOSE SERPL-MCNC: 171 MG/DL (ref 65–140)
GLUCOSE SERPL-MCNC: 177 MG/DL (ref 65–140)
HCT VFR BLD AUTO: 33.8 % (ref 34.8–46.1)
HGB BLD-MCNC: 10.4 G/DL (ref 11.5–15.4)
IMM GRANULOCYTES # BLD AUTO: 0.13 THOUSAND/UL (ref 0–0.2)
IMM GRANULOCYTES NFR BLD AUTO: 1 % (ref 0–2)
LYMPHOCYTES # BLD AUTO: 3.42 THOUSANDS/ÂΜL (ref 0.6–4.47)
LYMPHOCYTES NFR BLD AUTO: 29 % (ref 14–44)
MCH RBC QN AUTO: 23.6 PG (ref 26.8–34.3)
MCHC RBC AUTO-ENTMCNC: 30.8 G/DL (ref 31.4–37.4)
MCV RBC AUTO: 77 FL (ref 82–98)
MONOCYTES # BLD AUTO: 0.86 THOUSAND/ÂΜL (ref 0.17–1.22)
MONOCYTES NFR BLD AUTO: 7 % (ref 4–12)
NEUTROPHILS # BLD AUTO: 7.1 THOUSANDS/ÂΜL (ref 1.85–7.62)
NEUTS SEG NFR BLD AUTO: 58 % (ref 43–75)
NRBC BLD AUTO-RTO: 0 /100 WBCS
PLATELET # BLD AUTO: 325 THOUSANDS/UL (ref 149–390)
PMV BLD AUTO: 10.5 FL (ref 8.9–12.7)
POTASSIUM SERPL-SCNC: 3.7 MMOL/L (ref 3.5–5.3)
RBC # BLD AUTO: 4.4 MILLION/UL (ref 3.81–5.12)
SODIUM SERPL-SCNC: 140 MMOL/L (ref 135–147)
WBC # BLD AUTO: 12.02 THOUSAND/UL (ref 4.31–10.16)

## 2023-05-13 RX ORDER — POTASSIUM CHLORIDE 20 MEQ/1
40 TABLET, EXTENDED RELEASE ORAL ONCE
Status: COMPLETED | OUTPATIENT
Start: 2023-05-13 | End: 2023-05-13

## 2023-05-13 RX ADMIN — GABAPENTIN 100 MG: 100 CAPSULE ORAL at 16:45

## 2023-05-13 RX ADMIN — ATORVASTATIN CALCIUM 40 MG: 40 TABLET, FILM COATED ORAL at 09:04

## 2023-05-13 RX ADMIN — DOCUSATE SODIUM 100 MG: 100 CAPSULE, LIQUID FILLED ORAL at 09:04

## 2023-05-13 RX ADMIN — DOCUSATE SODIUM 100 MG: 100 CAPSULE, LIQUID FILLED ORAL at 16:45

## 2023-05-13 RX ADMIN — AMLODIPINE BESYLATE 10 MG: 10 TABLET ORAL at 09:04

## 2023-05-13 RX ADMIN — BUDESONIDE AND FORMOTEROL FUMARATE DIHYDRATE 1 PUFF: 160; 4.5 AEROSOL RESPIRATORY (INHALATION) at 16:46

## 2023-05-13 RX ADMIN — HEPARIN SODIUM 7500 UNITS: 5000 INJECTION INTRAVENOUS; SUBCUTANEOUS at 13:28

## 2023-05-13 RX ADMIN — METFORMIN HYDROCHLORIDE 1000 MG: 500 TABLET ORAL at 16:46

## 2023-05-13 RX ADMIN — METOPROLOL TARTRATE 25 MG: 25 TABLET, FILM COATED ORAL at 09:05

## 2023-05-13 RX ADMIN — PREDNISONE 4 MG: 1 TABLET ORAL at 09:05

## 2023-05-13 RX ADMIN — HEPARIN SODIUM 7500 UNITS: 5000 INJECTION INTRAVENOUS; SUBCUTANEOUS at 05:50

## 2023-05-13 RX ADMIN — METOPROLOL TARTRATE 25 MG: 25 TABLET, FILM COATED ORAL at 22:06

## 2023-05-13 RX ADMIN — GABAPENTIN 100 MG: 100 CAPSULE ORAL at 09:04

## 2023-05-13 RX ADMIN — POTASSIUM CHLORIDE 40 MEQ: 1500 TABLET, EXTENDED RELEASE ORAL at 17:43

## 2023-05-13 RX ADMIN — TOPIRAMATE 100 MG: 100 TABLET, FILM COATED ORAL at 22:05

## 2023-05-13 RX ADMIN — INSULIN LISPRO 2 UNITS: 100 INJECTION, SOLUTION INTRAVENOUS; SUBCUTANEOUS at 16:46

## 2023-05-13 RX ADMIN — HEPARIN SODIUM 7500 UNITS: 5000 INJECTION INTRAVENOUS; SUBCUTANEOUS at 22:06

## 2023-05-13 RX ADMIN — Medication 2000 UNITS: at 09:04

## 2023-05-13 RX ADMIN — LOSARTAN POTASSIUM 100 MG: 50 TABLET, FILM COATED ORAL at 09:04

## 2023-05-13 RX ADMIN — METFORMIN HYDROCHLORIDE 1000 MG: 500 TABLET ORAL at 09:06

## 2023-05-13 RX ADMIN — BUDESONIDE AND FORMOTEROL FUMARATE DIHYDRATE 1 PUFF: 160; 4.5 AEROSOL RESPIRATORY (INHALATION) at 09:04

## 2023-05-13 RX ADMIN — INSULIN GLARGINE 26 UNITS: 100 INJECTION, SOLUTION SUBCUTANEOUS at 22:04

## 2023-05-13 NOTE — PLAN OF CARE
Problem: DISCHARGE PLANNING  Goal: Discharge to home or other facility with appropriate resources  Description: INTERVENTIONS:  - Identify barriers to discharge w/patient and caregiver  - Arrange for needed discharge resources and transportation as appropriate  - Identify discharge learning needs (meds, wound care, etc )  - Arrange for interpretive services to assist at discharge as needed  - Refer to Case Management Department for coordinating discharge planning if the patient needs post-hospital services based on physician/advanced practitioner order or complex needs related to functional status, cognitive ability, or social support system  5/13/2023 0037 by Fred Santana RN  Outcome: Progressing  5/13/2023 0037 by Fred Santana RN  Outcome: Progressing

## 2023-05-13 NOTE — PROGRESS NOTES
Progress Notes - Family Medicine Residency, Alexi Torres 1960, 57 y o  female  MRN: [id number]    Unit/Bed#: -01 Encounter: [id number]  Primary Care Provider: Filomena Bowser MD      Admission Date: 5/8/2023 1114  Length of Stay: 4 days  Code Status:  Level 1 - Full Code  Consult:   IP CONSULT TO CASE MANAGEMENT  IP CONSULT TO PODIATRY      Assessments & Plans:   Plans discussed with [facility] team and finalization is pending attending physician Dr Nathalia Mathews attestation  * Ambulatory dysfunction  Assessment & Plan  Patient presented to the ED for open blisters of the feet bilaterally, which were draining  History of uncontrolled diabetes, neuropathy, homelessness which is affecting her care  Patient was recently discharged from rehab last week  On physical exam, blisters do not appear to be infected   Vitals and labs otherwise WNL    Plan:  · PT/OT referral - recommend STR  · Podiatry consulted - WBAT, wound care  · CM referral  · Fall precautions     Uncontrolled type 2 diabetes mellitus with hyperglycemia [facility])  Assessment & Plan  Lab Results   Component Value Date    HGBA1C 11 2 (H) 04/19/2023       Recent Labs     05/12/23  1612 05/12/23  2118 05/13/23  0634 05/13/23  1121   POCGLU 189* 189* 139 142*       Blood Sugar Average: Last 72 hrs:  (P) 179 [id number]   Uncontrolled DM with diabetic retinopathy (legally blind), CKD stage III  Home regimen: Lantus 25 units twice daily, Humalog 5 units 3 times daily with meals  Has not been on her diabetes medications since discharge from recent rehab  · Lantus 26 units qhs   · Continue SSI with glucose checks   · Continue Metformin 1000mg BID, tolerating well    Hypertension  Assessment & Plan  Last Blood Pressure: 238/65  Systolic (02DBC), RIF:886 , Min:138 , OKA:955   Diastolic (76LDD), ABP:25, Min:76, Max:89    Home regimen: Amlodipine 5mg daily, Losartan 100mg daily, Lopressor 100mg q12hrs, Lasix 20mg BID, Hydralazine 10mg TID  Was not taking her BP meds prior to admission due to multiple factors - homelessness, difficulty reading small print on pill bottles  · Continue Losartan 100mg daily   · Continue amlodipine 10mg daily  · Continue Lopressor 25mg q12hrs     Friction blisters of the soles  Assessment & Plan  Stable on exam, does not appear infected  Also has history of uncontrolled DM and neuropathy  Local wound care  Podiatry following    Dyslipidemia  Assessment & Plan  Continue Lipitor 40mg daily    Homelessness  Assessment & Plan  Socially complex history, patient and her son became homeless back in February and the patient has had multiple ED visits, admissions to both hospital and STRs  Last discharged from rehab this past week, has been homeless since     Affecting her access to care and medications  · CM referral placed  · Patient would maybe benefit from pill packs on discharge given her blindness    Diabetic neuropathy [facility])  Assessment & Plan  Lab Results   Component Value Date    HGBA1C 11 2 (H) 04/19/2023       Recent Labs     05/12/23  1612 05/12/23  2118 05/13/23  0634 05/13/23  1121   POCGLU 189* 189* 139 142*       Blood Sugar Average: Last 72 hrs:  (P) 179 [id number]     Continue PTA Gabapentin 100mg BID    Stage 3a chronic kidney disease [facility])  Assessment & Plan  Lab Results   Component Value Date    EGFR 59 05/13/2023    EGFR 46 05/12/2023    EGFR 66 05/11/2023    CREATININE 1 00 05/13/2023    CREATININE 1 23 05/12/2023    CREATININE 0 92 05/11/2023     Stable renal function  Will continue to monitor     PMR (polymyalgia rheumatica) ([facility])  Assessment & Plan  Home regimen: prednisone 4mg daily, continue  Will need to continue outpatient follow-up    Seizures ([facility] [address] )  Assessment & Plan  Hx of seizures and migraines  Continue PTA Topamax 100mg qhs      Patient Active Problem List   Diagnosis   • Uncontrolled type 2 diabetes mellitus with hyperglycemia ([facility])   • Seizures ([facility])   • Exertional dyspnea   • Enlarged pulmonary artery (HCC)   • Aortic dilatation (HCC)   • Anemia   • Decreased pulses in feet   • Hyperlipidemia   • Microalbuminuria   • Obstructive sleep apnea (adult) (pediatric)   • Class 3 severe obesity with serious comorbidity and body mass index (BMI) of 50 0 to 59 9 in adult [facility])   • Neuropathy of hand, right   • Prolonged QT interval   • Visual impairment in both eyes   • Vitamin D deficiency   • Lung nodules   • Orthostatic hypotension   • Mild intermittent asthma without complication   • Type 2 diabetes mellitus with microalbuminuria, with long-term current use of insulin (HCC)   • Frequent headaches   • Other inflammatory and immune myopathies, not elsewhere classified   • Transaminitis   • Morbid obesity (HCC)   • Polyneuropathy associated with underlying disease (HCC)   • PMR (polymyalgia rheumatica) (HCC)   • Thrombocytosis   • Left cavernous carotid aneurysm   • Type 2 diabetes mellitus with hyperglycemia, with long-term current use of insulin (HCC)   • Aneurysm (HCC)   • Thyroid nodule   • History of absence seizures   • Hypersomnia   • Migraine without aura and without status migrainosus, not intractable   • Cerebral aneurysm without rupture   • Chronic migraine without aura without status migrainosus, not intractable   • Type 2 diabetes mellitus with other specified complication (HCC)   • Hypertension   • Depressed mood   • Blurry vision, bilateral   • Ulnar neuropathy of right upper extremity   • Polymyalgia rheumatica (HCC)   • Gait instability   • Stage 3a chronic kidney disease (HCC)   • Chronic migraine without aura, not intractable, without status migrainosus   • Obstructive sleep apnea   • Diabetic neuropathy (HCC)   • Unsteadiness on feet   • Abnormal urinalysis   • Weakness   • Intertrigo   • Leg numbness   • Homelessness   • Bilateral lower extremity edema   • Diarrhea   • Dyslipidemia   • Insulin long-term use (HCC)   • Type 2 diabetes mellitus with hyperglycemia ([facility])   • Ambulatory dysfunction   • Legally blind   • Suspected pressure injury of deep tissue   • Calculus of gallbladder without cholecystitis without obstruction   • Sepsis ([address] )   • Diabetic foot ulcers (HCC)   • Abnormal CT scan   • Friction blisters of the soles       Diet: Diet Adria/CHO Controlled; Consistent Carbohydrate Diet Level 2 (5 carb servings/75 grams CHO/meal)  VTE Pharm PPX: Heparin  VTE Mech PPX: sequential compression device      Subjective:   • Patient without acute events overnight per handoff  • No concern or report per nursing staff  • Patient seen and examined at bedside and without questions or concerns  • Patient offers no complaints or concerns  • Denies cough, congestion, SOB, chest pain, abdominal pain, diarrhea, constipation  • Patient understands and agrees with plan      Vitals:     Vitals:    05/12/23 2223 05/12/23 2224 05/13/23 0017 05/13/23 0634   BP: 144/77 140/77 138/79 156/89   Pulse: 84 84 81 75   Resp:   18    Temp:   98 4 °F (36 9 °C) 98 3 °F (36 8 °C)   TempSrc:       SpO2:  97% 94% 95%     Temp:  [97 8 °F (36 6 °C)-98 4 °F (36 9 °C)] 98 3 °F (36 8 °C)  HR:  [75-84] 75  Resp:  [18] 18  BP: (138-156)/(76-89) 156/89  Weight (last 2 days)     None          Intake/Output Summary (Last 24 hours) at 5/13/2023 1328  Last data filed at 5/13/2023 1100  Gross per 24 hour   Intake 360 ml   Output --   Net 360 ml     Invasive Devices     Peripheral Intravenous Line  Duration           Peripheral IV 05/12/23 Right Antecubital 1 day                Labs:     CBC:  Results from last 7 days   Lab Units 05/13/23  0552 05/12/23  1223 05/11/23  0511 05/10/23  0538 05/08/23  1440   WBC Thousand/uL 12 02* 11 00* 10 46* 9 34 10 12   HEMOGLOBIN g/dL 10 4* 11 0* 10 5* 10 0* 10 3*   HEMATOCRIT % 33 8* 35 8 35 4 32 6* 34 6*   PLATELETS Thousands/uL 325 366 337 304 323   NEUTROS ABS Thousands/µL 7 10 7 58 6 48 5 23 6 76       CMP:  Results from last 7 days   Lab Units 05/13/23  0552 05/12/23  1229 05/11/23  0511 05/10/23  0538 05/08/23  1440   POTASSIUM mmol/L 3 7 4 2 3 5 3 5 3 5   CHLORIDE mmol/L 114* 111* 112* 112* 115*   CO2 mmol/L 25 22 23 23 26   BUN mg/dL 14 13 13 13 22   CREATININE mg/dL 1 00 1 23 0 92 0 92 1 01   CALCIUM mg/dL 9 5 9 9 9 4 8 9 8 9   EGFR ml/min/1 73sq m 59 46 66 66 58       Sepsis:        Micro:  Lab Results   Component Value Date/Time    Blood Culture No Growth After 5 Days  04/11/2023 06:48 PM    Blood Culture No Growth After 5 Days  04/11/2023 06:40 PM    Urine Culture >100,000 cfu/ml Candida glabrata (A) 02/28/2023 08:10 PM    Urine Culture 30,000-39,000 cfu/ml 02/28/2023 08:10 PM    C difficile toxin by PCR Negative 03/06/2023 07:52 PM       Imaging:   No results found  Medications:     Current Facility-Administered Medications   Medication Dose Route Frequency   • amLODIPine (NORVASC) tablet 10 mg  10 mg Oral Daily   • atorvastatin (LIPITOR) tablet 40 mg  40 mg Oral Daily   • budesonide-formoterol (SYMBICORT) 160-4 5 mcg/act inhaler 1 puff  1 puff Inhalation BID   • cholecalciferol (VITAMIN D3) tablet 2,000 Units  2,000 Units Oral Daily   • docusate sodium (COLACE) capsule 100 mg  100 mg Oral BID   • gabapentin (NEURONTIN) capsule 100 mg  100 mg Oral BID   • heparin (porcine) subcutaneous injection 7,500 Units  7,500 Units Subcutaneous Q8H [address]   • insulin glargine (LANTUS) subcutaneous injection 26 Units 0 26 mL  26 Units Subcutaneous HS   • insulin lispro (HumaLOG) 100 units/mL subcutaneous injection 2-12 Units  2-12 Units Subcutaneous TID AC   • losartan (COZAAR) tablet 100 mg  100 mg Oral Daily   • metFORMIN (GLUCOPHAGE) tablet 1,000 mg  1,000 mg Oral BID With Meals   • metoprolol tartrate (LOPRESSOR) tablet 25 mg  25 mg Oral Q12H RONI   • predniSONE tablet 4 mg  4 mg Oral Daily   • topiramate (TOPAMAX) tablet 100 mg  100 mg Oral HS       Physical Exam:     General: Pt observed sitting comfortably in the chair, NAD  Not toxic/ill-appearing  No cachectic or diaphoresis   No obvious sign of trauma or bleeding  Psych: able to converse appropriately  Neuro: no gross neurological deficits  Head: atraumatic, normocephalic  Eyes: open spontaneously, EOM intact, conjunctiva non-injected, no scleral icterus, no discharge  Ear: normal external ear, no visible drainage at external auditory orifice  Nose: no epistaxis, no rhinorrhea  Throat: no hoarse voice, no cough  Neck: normal ROM  Heart: RRR, no murmur/distant heart sound appreciated  Lungs: LCTABL, nml respiratory effort, no agonal/labored breathing, no accessory muscle use    Abdomen: soft, nontender, nondistended, normal bowel sound  Extremities: +1 pitting edema b/l    Ivette Turner,   PGY-2, Family Medicine  05/13/23  1:28 PM  0

## 2023-05-14 LAB
ANION GAP SERPL CALCULATED.3IONS-SCNC: 1 MMOL/L (ref 4–13)
BASOPHILS # BLD AUTO: 0.06 THOUSANDS/ÂΜL (ref 0–0.1)
BASOPHILS NFR BLD AUTO: 1 % (ref 0–1)
BUN SERPL-MCNC: 14 MG/DL (ref 5–25)
CALCIUM SERPL-MCNC: 9.4 MG/DL (ref 8.3–10.1)
CHLORIDE SERPL-SCNC: 115 MMOL/L (ref 96–108)
CO2 SERPL-SCNC: 23 MMOL/L (ref 21–32)
CREAT SERPL-MCNC: 0.86 MG/DL (ref 0.6–1.3)
EOSINOPHIL # BLD AUTO: 0.42 THOUSAND/ÂΜL (ref 0–0.61)
EOSINOPHIL NFR BLD AUTO: 4 % (ref 0–6)
ERYTHROCYTE [DISTWIDTH] IN BLOOD BY AUTOMATED COUNT: 19.3 % (ref 11.6–15.1)
GFR SERPL CREATININE-BSD FRML MDRD: 71 ML/MIN/1.73SQ M
GLUCOSE SERPL-MCNC: 114 MG/DL (ref 65–140)
GLUCOSE SERPL-MCNC: 117 MG/DL (ref 65–140)
GLUCOSE SERPL-MCNC: 127 MG/DL (ref 65–140)
GLUCOSE SERPL-MCNC: 176 MG/DL (ref 65–140)
HCT VFR BLD AUTO: 33.9 % (ref 34.8–46.1)
HGB BLD-MCNC: 10.5 G/DL (ref 11.5–15.4)
IMM GRANULOCYTES # BLD AUTO: 0.16 THOUSAND/UL (ref 0–0.2)
IMM GRANULOCYTES NFR BLD AUTO: 1 % (ref 0–2)
LYMPHOCYTES # BLD AUTO: 3.14 THOUSANDS/ÂΜL (ref 0.6–4.47)
LYMPHOCYTES NFR BLD AUTO: 27 % (ref 14–44)
MCH RBC QN AUTO: 23.9 PG (ref 26.8–34.3)
MCHC RBC AUTO-ENTMCNC: 31 G/DL (ref 31.4–37.4)
MCV RBC AUTO: 77 FL (ref 82–98)
MONOCYTES # BLD AUTO: 0.87 THOUSAND/ÂΜL (ref 0.17–1.22)
MONOCYTES NFR BLD AUTO: 7 % (ref 4–12)
NEUTROPHILS # BLD AUTO: 7.2 THOUSANDS/ÂΜL (ref 1.85–7.62)
NEUTS SEG NFR BLD AUTO: 60 % (ref 43–75)
NRBC BLD AUTO-RTO: 0 /100 WBCS
PLATELET # BLD AUTO: 309 THOUSANDS/UL (ref 149–390)
PMV BLD AUTO: 10.9 FL (ref 8.9–12.7)
POTASSIUM SERPL-SCNC: 3.9 MMOL/L (ref 3.5–5.3)
RBC # BLD AUTO: 4.4 MILLION/UL (ref 3.81–5.12)
SODIUM SERPL-SCNC: 139 MMOL/L (ref 135–147)
WBC # BLD AUTO: 11.85 THOUSAND/UL (ref 4.31–10.16)

## 2023-05-14 RX ORDER — METOPROLOL TARTRATE 50 MG/1
50 TABLET, FILM COATED ORAL EVERY 12 HOURS SCHEDULED
Status: DISCONTINUED | OUTPATIENT
Start: 2023-05-14 | End: 2023-05-18 | Stop reason: HOSPADM

## 2023-05-14 RX ADMIN — BUDESONIDE AND FORMOTEROL FUMARATE DIHYDRATE 1 PUFF: 160; 4.5 AEROSOL RESPIRATORY (INHALATION) at 18:14

## 2023-05-14 RX ADMIN — BUDESONIDE AND FORMOTEROL FUMARATE DIHYDRATE 1 PUFF: 160; 4.5 AEROSOL RESPIRATORY (INHALATION) at 08:43

## 2023-05-14 RX ADMIN — METFORMIN HYDROCHLORIDE 1000 MG: 500 TABLET ORAL at 08:41

## 2023-05-14 RX ADMIN — AMLODIPINE BESYLATE 10 MG: 10 TABLET ORAL at 08:41

## 2023-05-14 RX ADMIN — METOPROLOL TARTRATE 50 MG: 50 TABLET, FILM COATED ORAL at 08:41

## 2023-05-14 RX ADMIN — DOCUSATE SODIUM 100 MG: 100 CAPSULE, LIQUID FILLED ORAL at 18:14

## 2023-05-14 RX ADMIN — GABAPENTIN 100 MG: 100 CAPSULE ORAL at 08:41

## 2023-05-14 RX ADMIN — Medication 2000 UNITS: at 08:40

## 2023-05-14 RX ADMIN — METOPROLOL TARTRATE 50 MG: 50 TABLET, FILM COATED ORAL at 21:28

## 2023-05-14 RX ADMIN — HEPARIN SODIUM 7500 UNITS: 5000 INJECTION INTRAVENOUS; SUBCUTANEOUS at 13:57

## 2023-05-14 RX ADMIN — PREDNISONE 4 MG: 1 TABLET ORAL at 08:40

## 2023-05-14 RX ADMIN — HEPARIN SODIUM 7500 UNITS: 5000 INJECTION INTRAVENOUS; SUBCUTANEOUS at 21:28

## 2023-05-14 RX ADMIN — METFORMIN HYDROCHLORIDE 1000 MG: 500 TABLET ORAL at 18:14

## 2023-05-14 RX ADMIN — INSULIN GLARGINE 26 UNITS: 100 INJECTION, SOLUTION SUBCUTANEOUS at 21:28

## 2023-05-14 RX ADMIN — LOSARTAN POTASSIUM 100 MG: 50 TABLET, FILM COATED ORAL at 08:41

## 2023-05-14 RX ADMIN — DOCUSATE SODIUM 100 MG: 100 CAPSULE, LIQUID FILLED ORAL at 08:40

## 2023-05-14 RX ADMIN — GABAPENTIN 100 MG: 100 CAPSULE ORAL at 18:14

## 2023-05-14 RX ADMIN — ATORVASTATIN CALCIUM 40 MG: 40 TABLET, FILM COATED ORAL at 08:41

## 2023-05-14 RX ADMIN — HEPARIN SODIUM 7500 UNITS: 5000 INJECTION INTRAVENOUS; SUBCUTANEOUS at 05:56

## 2023-05-14 RX ADMIN — TOPIRAMATE 100 MG: 100 TABLET, FILM COATED ORAL at 21:28

## 2023-05-14 NOTE — PROGRESS NOTES
PROGRESS NOTE - Family Medicine Residency Melaniejez Venegaskarel 1960, 57 y o  female  MRN: [id number]    Unit/Bed#: -01 Encounter: [id number]  Primary Care Provider: Madhuri Sevilla MD      Admission Date: 5/8/2023  Length of Stay: 5 days  Code Status:  Level 1 - Full Code  Diet: Diet Adria/CHO Controlled; Consistent Carbohydrate Diet Level 2 (5 carb servings/75 grams CHO/meal)  Consult:   IP CONSULT TO CASE MANAGEMENT  IP CONSULT TO PODIATRY      Assessment/Plan :     Discussed with [facility] team and finalization is pending attending physician attestation  * Ambulatory dysfunction  Assessment & Plan  Patient presented to the ED for open blisters of the feet bilaterally, which were draining  History of uncontrolled diabetes, neuropathy, homelessness which is affecting her care  Patient was recently discharged from rehab last week  On physical exam, blisters do not appear to be infected  Vitals and labs otherwise WNL    Plan:  · PT/OT referral - recommend STR  · Podiatry consulted - WBAT, wound care  · CM referral  · Fall precautions     Homelessness  Assessment & Plan  Socially complex history, patient and her son became homeless back in February and the patient has had multiple ED visits, admissions to both hospital and STRs  Last discharged from rehab this past week, has been homeless since     Affecting her access to care and medications  · CM referral placed  · Patient would maybe benefit from pill packs on discharge given her blindness    Uncontrolled type 2 diabetes mellitus with hyperglycemia [facility])  Assessment & Plan  Lab Results   Component Value Date    HGBA1C 11 2 (H) 04/19/2023       Recent Labs     05/13/23  1121 05/13/23  1619 05/13/23  2119 05/14/23  0634   POCGLU 142* 177* 171* 114       Blood Sugar Average: Last 72 hrs:  (P) 237 [id number]   Uncontrolled DM with diabetic retinopathy (legally blind), CKD stage III  Home regimen: Lantus 25 units twice daily, Humalog 5 units 3 times daily with meals  Has not been on her diabetes medications since discharge from recent rehab  · Lantus 26 units qhs   · Continue SSI with glucose checks   · Continue Metformin 1000mg BID, tolerating well    Stage 3a chronic kidney disease [facility])  Assessment & Plan  Lab Results   Component Value Date    EGFR 71 05/14/2023    EGFR 59 05/13/2023    EGFR 46 05/12/2023    CREATININE 0 86 05/14/2023    CREATININE 1 00 05/13/2023    CREATININE 1 23 05/12/2023     Stable renal function  Will continue to monitor     Hypertension  Assessment & Plan  Last Blood Pressure: 703/29  Systolic (31EYZ), FBD:529 , Min:122 , RDS:938   Diastolic (69VHK), TKI:41, Min:70, Max:89    Home regimen: Amlodipine 5mg daily, Losartan 100mg daily, Lopressor 100mg q12hrs, Lasix 20mg BID, Hydralazine 10mg TID  Was not taking her BP meds prior to admission due to multiple factors - homelessness, difficulty reading small print on pill bottles  · Continue Losartan 100mg daily   · Continue amlodipine 10mg daily  · Increase Lopressor to 50mg q12hrs     Friction blisters of the soles  Assessment & Plan  Stable on exam, does not appear infected  Also has history of uncontrolled DM and neuropathy  Local wound care  Podiatry following    Dyslipidemia  Assessment & Plan  Continue Lipitor 40mg daily    Diabetic neuropathy [facility])  Assessment & Plan  Lab Results   Component Value Date    HGBA1C 11 2 (H) 04/19/2023       Recent Labs     05/13/23  1121 05/13/23  1619 05/13/23  2119 05/14/23  0634   POCGLU 142* 177* 171* 114       Blood Sugar Average: Last 72 hrs:  (P) 820 [id number]     Continue PTA Gabapentin 100mg BID    PMR (polymyalgia rheumatica) ([facility])  Assessment & Plan  Home regimen: prednisone 4mg daily, continue  Will need to continue outpatient follow-up    Seizures ([facility] [address] )  Assessment & Plan  Hx of seizures and migraines  Continue PTA Topamax 100mg qhs        Principal Problem:    Ambulatory dysfunction  Active Problems:    Homelessness Uncontrolled type 2 diabetes mellitus with hyperglycemia (HCC)    Stage 3a chronic kidney disease (HCC)    Hypertension    Seizures (HCC)    PMR (polymyalgia rheumatica) (HCC)    Diabetic neuropathy (HCC)    Dyslipidemia    Friction blisters of the soles      VTE Pharm PPX: Heparin  VTE Mech PPX: sequential compression device and/or foot pump applied unless otherwise contraindicated       Hospital Course & 24hr events:     Overnight/24hr events:  Patient without acute events overnight per sign-out  No concern or report per nursing staff  Patient seen and examined at bedside and without questions or concerns  Subjective  & Review of Systems:     Review of Systems   Constitutional: Negative for chills and fever  HENT: Negative for ear pain and sore throat  Eyes: Negative for pain and visual disturbance  Respiratory: Negative for cough and shortness of breath  Cardiovascular: Negative for chest pain and palpitations  Gastrointestinal: Negative for abdominal pain and vomiting  Genitourinary: Negative for dysuria and hematuria  Musculoskeletal: Negative for arthralgias and back pain  Skin: Negative for color change and rash  Neurological: Negative for seizures and syncope  All other systems reviewed and are negative          Objective :     Vitals:    Vitals:    05/13/23 2208 05/13/23 2208 05/13/23 2320 05/14/23 0717   BP: 168/89  165/88 157/85   Pulse:  83 83 75   Resp:   18    Temp:   98 1 °F (36 7 °C)    TempSrc:       SpO2:  97% 97% 98%     Temp:  [98 1 °F (36 7 °C)-98 2 °F (36 8 °C)] 98 1 °F (36 7 °C)  HR:  [75-83] 75  Resp:  [18-20] 18  BP: (122-168)/(70-89) 157/85  Weight (last 2 days)     None          Intake/Output Summary (Last 24 hours) at 5/14/2023 1214  Last data filed at 5/14/2023 0900  Gross per 24 hour   Intake 120 ml   Output --   Net 120 ml     Invasive Devices     Peripheral Intravenous Line  Duration           Peripheral IV 05/12/23 Right Antecubital 1 day Labs:  I have personally reviewed pertinent reports  Results from last 7 days   Lab Units 05/14/23  0606 05/13/23  0552 05/12/23  1223 05/11/23  0511 05/10/23  0538 05/08/23  1440   WBC Thousand/uL 11 85* 12 02* 11 00* 10 46* 9 34 10 12   HEMOGLOBIN g/dL 10 5* 10 4* 11 0* 10 5* 10 0* 10 3*   HEMATOCRIT % 33 9* 33 8* 35 8 35 4 32 6* 34 6*   PLATELETS Thousands/uL 309 325 366 337 304 323   NEUTROS ABS Thousands/µL 7 20 7 10 7 58 6 48 5 23 6 76       Results from last 7 days   Lab Units 05/14/23  0606 05/13/23  0552 05/12/23  1223 05/11/23  0511 05/10/23  0538 05/08/23  1440   POTASSIUM mmol/L 3 9 3 7 4 2 3 5 3 5 3 5   CHLORIDE mmol/L 115* 114* 111* 112* 112* 115*   CO2 mmol/L 23 25 22 23 23 26   BUN mg/dL 14 14 13 13 13 22   CREATININE mg/dL 0 86 1 00 1 23 0 92 0 92 1 01   CALCIUM mg/dL 9 4 9 5 9 9 9 4 8 9 8 9   EGFR ml/min/1 73sq m 71 59 46 66 66 58               EKG, Pathology, Imaging, and Other Studies:     Lab Results   Component Value Date/Time    Blood Culture No Growth After 5 Days  04/11/2023 06:48 PM    Blood Culture No Growth After 5 Days  04/11/2023 06:40 PM    Urine Culture >100,000 cfu/ml Candida glabrata (A) 02/28/2023 08:10 PM    Urine Culture 30,000-39,000 cfu/ml 02/28/2023 08:10 PM    C difficile toxin by PCR Negative 03/06/2023 07:52 PM       No results found        Inpatient medications:     Current Facility-Administered Medications   Medication Dose Route Frequency   • amLODIPine (NORVASC) tablet 10 mg  10 mg Oral Daily   • atorvastatin (LIPITOR) tablet 40 mg  40 mg Oral Daily   • budesonide-formoterol (SYMBICORT) 160-4 5 mcg/act inhaler 1 puff  1 puff Inhalation BID   • cholecalciferol (VITAMIN D3) tablet 2,000 Units  2,000 Units Oral Daily   • docusate sodium (COLACE) capsule 100 mg  100 mg Oral BID   • gabapentin (NEURONTIN) capsule 100 mg  100 mg Oral BID   • heparin (porcine) subcutaneous injection 7,500 Units  7,500 Units Subcutaneous Q8H [address]   • insulin glargine (LANTUS) subcutaneous injection 26 Units 0 26 mL  26 Units Subcutaneous HS   • insulin lispro (HumaLOG) 100 units/mL subcutaneous injection 2-12 Units  2-12 Units Subcutaneous TID AC   • losartan (COZAAR) tablet 100 mg  100 mg Oral Daily   • metFORMIN (GLUCOPHAGE) tablet 1,000 mg  1,000 mg Oral BID With Meals   • metoprolol tartrate (LOPRESSOR) tablet 50 mg  50 mg Oral Q12H RONI   • predniSONE tablet 4 mg  4 mg Oral Daily   • topiramate (TOPAMAX) tablet 100 mg  100 mg Oral HS         Physical Exam :     Physical Exam  Vitals reviewed  Constitutional:       General: She is not in acute distress  Appearance: Normal appearance  HENT:      Head: Normocephalic and atraumatic  Nose: Nose normal    Eyes:      Extraocular Movements: Extraocular movements intact  Conjunctiva/sclera: Conjunctivae normal    Cardiovascular:      Rate and Rhythm: Normal rate and regular rhythm  Heart sounds: Normal heart sounds  No murmur heard  Pulmonary:      Effort: Pulmonary effort is normal  No respiratory distress  Breath sounds: Normal breath sounds  Abdominal:      General: Abdomen is flat  Palpations: Abdomen is soft  Tenderness: There is no abdominal tenderness  Musculoskeletal:         General: Normal range of motion  Cervical back: Normal range of motion  Neurological:      Mental Status: She is alert and oriented to person, place, and time     Psychiatric:         Mood and Affect: Mood normal          Behavior: Behavior normal                Lizzie Hendricks MD  PGY-2, Family Medicine  05/14/23  12:14 PM

## 2023-05-15 LAB
ANION GAP SERPL CALCULATED.3IONS-SCNC: 1 MMOL/L (ref 4–13)
BASOPHILS # BLD AUTO: 0.06 THOUSANDS/ÂΜL (ref 0–0.1)
BASOPHILS NFR BLD AUTO: 1 % (ref 0–1)
BUN SERPL-MCNC: 18 MG/DL (ref 5–25)
CALCIUM SERPL-MCNC: 9.3 MG/DL (ref 8.3–10.1)
CHLORIDE SERPL-SCNC: 114 MMOL/L (ref 96–108)
CO2 SERPL-SCNC: 24 MMOL/L (ref 21–32)
CREAT SERPL-MCNC: 0.88 MG/DL (ref 0.6–1.3)
EOSINOPHIL # BLD AUTO: 0.39 THOUSAND/ÂΜL (ref 0–0.61)
EOSINOPHIL NFR BLD AUTO: 3 % (ref 0–6)
ERYTHROCYTE [DISTWIDTH] IN BLOOD BY AUTOMATED COUNT: 19.3 % (ref 11.6–15.1)
GFR SERPL CREATININE-BSD FRML MDRD: 69 ML/MIN/1.73SQ M
GLUCOSE SERPL-MCNC: 107 MG/DL (ref 65–140)
GLUCOSE SERPL-MCNC: 128 MG/DL (ref 65–140)
GLUCOSE SERPL-MCNC: 142 MG/DL (ref 65–140)
GLUCOSE SERPL-MCNC: 165 MG/DL (ref 65–140)
GLUCOSE SERPL-MCNC: 205 MG/DL (ref 65–140)
HCT VFR BLD AUTO: 33.2 % (ref 34.8–46.1)
HGB BLD-MCNC: 10.3 G/DL (ref 11.5–15.4)
IMM GRANULOCYTES # BLD AUTO: 0.15 THOUSAND/UL (ref 0–0.2)
IMM GRANULOCYTES NFR BLD AUTO: 1 % (ref 0–2)
LYMPHOCYTES # BLD AUTO: 3.65 THOUSANDS/ÂΜL (ref 0.6–4.47)
LYMPHOCYTES NFR BLD AUTO: 28 % (ref 14–44)
MCH RBC QN AUTO: 23.8 PG (ref 26.8–34.3)
MCHC RBC AUTO-ENTMCNC: 31 G/DL (ref 31.4–37.4)
MCV RBC AUTO: 77 FL (ref 82–98)
MONOCYTES # BLD AUTO: 0.86 THOUSAND/ÂΜL (ref 0.17–1.22)
MONOCYTES NFR BLD AUTO: 7 % (ref 4–12)
NEUTROPHILS # BLD AUTO: 8.01 THOUSANDS/ÂΜL (ref 1.85–7.62)
NEUTS SEG NFR BLD AUTO: 60 % (ref 43–75)
NRBC BLD AUTO-RTO: 0 /100 WBCS
PLATELET # BLD AUTO: 333 THOUSANDS/UL (ref 149–390)
PMV BLD AUTO: 10.8 FL (ref 8.9–12.7)
POTASSIUM SERPL-SCNC: 3.6 MMOL/L (ref 3.5–5.3)
RBC # BLD AUTO: 4.32 MILLION/UL (ref 3.81–5.12)
SODIUM SERPL-SCNC: 139 MMOL/L (ref 135–147)
WBC # BLD AUTO: 13.12 THOUSAND/UL (ref 4.31–10.16)

## 2023-05-15 RX ADMIN — INSULIN LISPRO 2 UNITS: 100 INJECTION, SOLUTION INTRAVENOUS; SUBCUTANEOUS at 12:22

## 2023-05-15 RX ADMIN — METOPROLOL TARTRATE 50 MG: 50 TABLET, FILM COATED ORAL at 09:35

## 2023-05-15 RX ADMIN — Medication 2000 UNITS: at 09:36

## 2023-05-15 RX ADMIN — HEPARIN SODIUM 7500 UNITS: 5000 INJECTION INTRAVENOUS; SUBCUTANEOUS at 05:14

## 2023-05-15 RX ADMIN — LOSARTAN POTASSIUM 100 MG: 50 TABLET, FILM COATED ORAL at 09:36

## 2023-05-15 RX ADMIN — AMLODIPINE BESYLATE 10 MG: 10 TABLET ORAL at 09:35

## 2023-05-15 RX ADMIN — DOCUSATE SODIUM 100 MG: 100 CAPSULE, LIQUID FILLED ORAL at 09:36

## 2023-05-15 RX ADMIN — METOPROLOL TARTRATE 50 MG: 50 TABLET, FILM COATED ORAL at 21:23

## 2023-05-15 RX ADMIN — METFORMIN HYDROCHLORIDE 1000 MG: 500 TABLET ORAL at 17:01

## 2023-05-15 RX ADMIN — GABAPENTIN 100 MG: 100 CAPSULE ORAL at 09:36

## 2023-05-15 RX ADMIN — TOPIRAMATE 100 MG: 100 TABLET, FILM COATED ORAL at 21:23

## 2023-05-15 RX ADMIN — BUDESONIDE AND FORMOTEROL FUMARATE DIHYDRATE 1 PUFF: 160; 4.5 AEROSOL RESPIRATORY (INHALATION) at 17:01

## 2023-05-15 RX ADMIN — ATORVASTATIN CALCIUM 40 MG: 40 TABLET, FILM COATED ORAL at 09:35

## 2023-05-15 RX ADMIN — HEPARIN SODIUM 7500 UNITS: 5000 INJECTION INTRAVENOUS; SUBCUTANEOUS at 13:38

## 2023-05-15 RX ADMIN — INSULIN LISPRO 4 UNITS: 100 INJECTION, SOLUTION INTRAVENOUS; SUBCUTANEOUS at 16:59

## 2023-05-15 RX ADMIN — GABAPENTIN 100 MG: 100 CAPSULE ORAL at 17:01

## 2023-05-15 RX ADMIN — DOCUSATE SODIUM 100 MG: 100 CAPSULE, LIQUID FILLED ORAL at 17:01

## 2023-05-15 RX ADMIN — INSULIN GLARGINE 26 UNITS: 100 INJECTION, SOLUTION SUBCUTANEOUS at 21:23

## 2023-05-15 RX ADMIN — PREDNISONE 4 MG: 1 TABLET ORAL at 09:37

## 2023-05-15 RX ADMIN — BUDESONIDE AND FORMOTEROL FUMARATE DIHYDRATE 1 PUFF: 160; 4.5 AEROSOL RESPIRATORY (INHALATION) at 09:37

## 2023-05-15 RX ADMIN — HEPARIN SODIUM 7500 UNITS: 5000 INJECTION INTRAVENOUS; SUBCUTANEOUS at 21:23

## 2023-05-15 RX ADMIN — METFORMIN HYDROCHLORIDE 1000 MG: 500 TABLET ORAL at 09:36

## 2023-05-15 NOTE — PLAN OF CARE
Problem: PAIN - ADULT  Goal: Verbalizes/displays adequate comfort level or baseline comfort level  Description: Interventions:  - Encourage patient to monitor pain and request assistance  - Assess pain using appropriate pain scale  - Administer analgesics based on type and severity of pain and evaluate response  - Implement non-pharmacological measures as appropriate and evaluate response  - Consider cultural and social influences on pain and pain management  - Notify physician/advanced practitioner if interventions unsuccessful or patient reports new pain  Outcome: Progressing     Problem: SAFETY ADULT  Goal: Maintain or return to baseline ADL function  Description: INTERVENTIONS:  -  Assess patient's ability to carry out ADLs; assess patient's baseline for ADL function and identify physical deficits which impact ability to perform ADLs (bathing, care of mouth/teeth, toileting, grooming, dressing, etc )  - Assess/evaluate cause of self-care deficits   - Assess range of motion  - Assess patient's mobility; develop plan if impaired  - Assess patient's need for assistive devices and provide as appropriate  - Encourage maximum independence but intervene and supervise when necessary  - Involve family in performance of ADLs  - Assess for home care needs following discharge   - Consider OT consult to assist with ADL evaluation and planning for discharge  - Provide patient education as appropriate  Outcome: Progressing  Goal: Maintains/Returns to pre admission functional level  Description: INTERVENTIONS:  - Perform BMAT or MOVE assessment daily    - Set and communicate daily mobility goal to care team and patient/family/caregiver  - Collaborate with rehabilitation services on mobility goals if consulted  - Perform Range of Motion  times a day  - Reposition patient every 3 hours    - Dangle patient 3 times a day  - Stand patient 3 times a day  - Ambulate patient 3 times a day  - Out of bed to chair 3 times a day   - Out of bed for meals 3 times a day  - Out of bed for toileting  - Record patient progress and toleration of activity level   Outcome: Progressing  Goal: Patient will remain free of falls  Description: INTERVENTIONS:  - Educate patient/family on patient safety including physical limitations  - Instruct patient to call for assistance with activity   - Consult OT/PT to assist with strengthening/mobility   - Keep Call bell within reach  - Keep bed low and locked with side rails adjusted as appropriate  - Keep care items and personal belongings within reach  - Initiate and maintain comfort rounds  - Make Fall Risk Sign visible to staff  - Apply yellow socks and bracelet for high fall risk patients  - Consider moving patient to room near nurses station  Outcome: Progressing     Problem: SKIN/TISSUE INTEGRITY - ADULT  Goal: Incision(s), wounds(s) or drain site(s) healing without S/S of infection  Description: INTERVENTIONS  - Assess and document dressing, incision, wound bed, drain sites and surrounding tissue  - Provide patient and family education  - Perform skin care/dressing changes every shift  Outcome: Progressing

## 2023-05-15 NOTE — PROGRESS NOTES
PROGRESS NOTE - Family Medicine Residency [place] Shivam 1960, 57 y o  female  MRN: [id number]    Unit/Bed#: -01 Encounter: [id number]  Primary Care Provider: Baron Gage MD      Admission Date: 5/8/2023  Length of Stay: 6 days  Code Status:  Level 1 - Full Code  Diet: Diet Adria/CHO Controlled; Consistent Carbohydrate Diet Level 2 (5 carb servings/75 grams CHO/meal)  Consult:   IP CONSULT TO CASE MANAGEMENT  IP CONSULT TO PODIATRY      Assessment/Plan :     Discussed with [facility] team and finalization is pending attending physician attestation  * Ambulatory dysfunction  Assessment & Plan  Patient presented to the ED for open blisters of the feet bilaterally, which were draining  History of uncontrolled diabetes, neuropathy, homelessness which is affecting her care  Patient was recently discharged from rehab last week  On physical exam, blisters do not appear to be infected  Vitals and labs otherwise WNL    Plan:  · PT/OT referral - recommend STR  · Podiatry consulted - WBAT, wound care  · CM referral  · Fall precautions     Homelessness  Assessment & Plan  Socially complex history, patient and her son became homeless back in February and the patient has had multiple ED visits, admissions to both hospital and STRs  Last discharged from rehab this past week, has been homeless since     Affecting her access to care and medications  · CM referral placed  · Patient would maybe benefit from pill packs on discharge given her blindness    Uncontrolled type 2 diabetes mellitus with hyperglycemia [facility])  Assessment & Plan  Lab Results   Component Value Date    HGBA1C 11 2 (H) 04/19/2023       Recent Labs     05/14/23  1636 05/14/23  2059 05/15/23  0630 05/15/23  1050   POCGLU 117 176* 142* 165*       Blood Sugar Average: Last 72 hrs:  (P) 411 [id number]   Uncontrolled DM with diabetic retinopathy (legally blind), CKD stage III  Home regimen: Lantus 25 units twice daily, Humalog 5 units 3 times daily with meals  Has not been on her diabetes medications since discharge from recent rehab  · Lantus 26 units qhs   · Continue SSI with glucose checks   · Continue Metformin 1000mg BID, tolerating well    Stage 3a chronic kidney disease [facility])  Assessment & Plan  Lab Results   Component Value Date    EGFR 69 05/15/2023    EGFR 71 05/14/2023    EGFR 59 05/13/2023    CREATININE 0 88 05/15/2023    CREATININE 0 86 05/14/2023    CREATININE 1 00 05/13/2023     Stable renal function  Will continue to monitor     Hypertension  Assessment & Plan  Last Blood Pressure: 311/48  Systolic (87UMG), BUE:988 , Min:137 , QYE:504   Diastolic (37TYA), ZDN:56, Min:68, Max:71    Home regimen: Amlodipine 5mg daily, Losartan 100mg daily, Lopressor 100mg q12hrs, Lasix 20mg BID, Hydralazine 10mg TID  Was not taking her BP meds prior to admission due to multiple factors - homelessness, difficulty reading small print on pill bottles  · Continue Losartan 100mg daily   · Continue amlodipine 10mg daily  · Continue Lopressor to 50mg q12hrs     Friction blisters of the soles  Assessment & Plan  Stable on exam, does not appear infected  Also has history of uncontrolled DM and neuropathy  Local wound care  Podiatry following    Dyslipidemia  Assessment & Plan  Continue Lipitor 40mg daily    Diabetic neuropathy [facility])  Assessment & Plan  Lab Results   Component Value Date    HGBA1C 11 2 (H) 04/19/2023       Recent Labs     05/14/23  1636 05/14/23  2059 05/15/23  0630 05/15/23  1050   POCGLU 117 176* 142* 165*       Blood Sugar Average: Last 72 hrs:  (P) 705 [id number]     Continue PTA Gabapentin 100mg BID    PMR (polymyalgia rheumatica) ([facility])  Assessment & Plan  Home regimen: prednisone 4mg daily, continue  Will need to continue outpatient follow-up    Seizures ([facility])  Assessment & Plan  Hx of seizures and migraines  Continue PTA Topamax 100mg qhs      Principal Problem:    Ambulatory dysfunction  Active Problems:    Homelessness Uncontrolled type 2 diabetes mellitus with hyperglycemia (HCC)    Stage 3a chronic kidney disease (HCC)    Hypertension    Seizures (HCC)    PMR (polymyalgia rheumatica) (HCC)    Diabetic neuropathy (HCC)    Dyslipidemia    Friction blisters of the soles      VTE [facility] PPX: Heparin  VTE [facility] PPX: sequential compression device and/or foot pump applied unless otherwise contraindicated       Hospital Course & 24hr events:     Overnight/24hr events:  Patient without acute events overnight per sign-out  No concern or report per nursing staff  Patient reports she is doing well   is currently working with her for placement         Subjective  & Review of Systems:     Review of Systems        Objective :     Vitals:    Vitals:    05/14/23 0717 05/14/23 1544 05/14/23 2056 05/15/23 0835   BP: 157/85 138/68 137/71 137/71   Pulse: 75 75  70   Resp:  18 20    Temp:  97 9 °F (36 6 °C) 98 °F (36 7 °C) 98 3 °F (36 8 °C)   TempSrc:       SpO2: 98% 97%  95%     Temp:  [97 9 °F (36 6 °C)-98 3 °F (36 8 °C)] 98 3 °F (36 8 °C)  HR:  [70-75] 70  Resp:  [18-20] 20  BP: (137-138)/(68-71) 137/71  Weight (last 2 days)     None        No intake or output data in the 24 hours ending 05/15/23 1432  Invasive Devices     Peripheral Intravenous Line  Duration           Peripheral IV 05/12/23 Right Antecubital 3 days                Labs:      Results from last 7 days   Lab Units 05/15/23  0513 05/14/23  0606 05/13/23  0552 05/12/23  1223 05/11/23  0511 05/10/23  0538 05/08/23  1440   WBC Thousand/uL 13 12* 11 85* 12 02* 11 00* 10 46* 9 34 10 12   HEMOGLOBIN g/dL 10 3* 10 5* 10 4* 11 0* 10 5* 10 0* 10 3*   HEMATOCRIT % 33 2* 33 9* 33 8* 35 8 35 4 32 6* 34 6*   PLATELETS Thousands/uL 333 309 325 366 337 304 323   NEUTROS ABS Thousands/µL 8 01* 7 20 7 10 7 58 6 48 5 23 6 76       Results from last 7 days   Lab Units 05/15/23  0513 05/14/23  0606 05/13/23  0552 05/12/23  1223 05/11/23  0511 05/10/23  0538 05/08/23  1440   POTASSIUM mmol/L 3 6 3 9 3 7 4 2 3 5 3 5 3 5   CHLORIDE mmol/L 114* 115* 114* 111* 112* 112* 115*   CO2 mmol/L 24 23 25 22 23 23 26   BUN mg/dL 18 14 14 13 13 13 22   CREATININE mg/dL 0 88 0 86 1 00 1 23 0 92 0 92 1 01   CALCIUM mg/dL 9 3 9 4 9 5 9 9 9 4 8 9 8 9   EGFR ml/min/1 73sq m 69 71 59 46 66 66 58               EKG, Pathology, Imaging, and Other Studies:     Lab Results   Component Value Date/Time    Blood Culture No Growth After 5 Days  04/11/2023 06:48 PM    Blood Culture No Growth After 5 Days  04/11/2023 06:40 PM    Urine Culture >100,000 cfu/ml Candida glabrata (A) 02/28/2023 08:10 PM    Urine Culture 30,000-39,000 cfu/ml 02/28/2023 08:10 PM    C difficile toxin by PCR Negative 03/06/2023 07:52 PM       No results found        Inpatient medications:     Current Facility-Administered Medications   Medication Dose Route Frequency   • amLODIPine (NORVASC) tablet 10 mg  10 mg Oral Daily   • atorvastatin (LIPITOR) tablet 40 mg  40 mg Oral Daily   • budesonide-formoterol (SYMBICORT) 160-4 5 mcg/act inhaler 1 puff  1 puff Inhalation BID   • cholecalciferol (VITAMIN D3) tablet 2,000 Units  2,000 Units Oral Daily   • docusate sodium (COLACE) capsule 100 mg  100 mg Oral BID   • gabapentin (NEURONTIN) capsule 100 mg  100 mg Oral BID   • heparin (porcine) subcutaneous injection 7,500 Units  7,500 Units Subcutaneous Q8H [facility]   • insulin glargine (LANTUS) subcutaneous injection 26 Units 0 26 mL  26 Units Subcutaneous HS   • insulin lispro (HumaLOG) 100 units/mL subcutaneous injection 2-12 Units  2-12 Units Subcutaneous TID AC   • losartan (COZAAR) tablet 100 mg  100 mg Oral Daily   • metFORMIN (GLUCOPHAGE) tablet 1,000 mg  1,000 mg Oral BID With Meals   • metoprolol tartrate (LOPRESSOR) tablet 50 mg  50 mg Oral Q12H RONI   • predniSONE tablet 4 mg  4 mg Oral Daily   • topiramate (TOPAMAX) tablet 100 mg  100 mg Oral HS         Physical Exam :     Physical Exam          Altagracia Victoria MD  PGY-2, Family Medicine  05/15/23  2:32 PM

## 2023-05-15 NOTE — CASE MANAGEMENT
Case Management Discharge Planning Note    Patient name Luz Maria Quinn  Location [address] [phone] MRN [id number]  : 1960 Date 5/15/2023       Current Admission Date: 2023  Current Admission Diagnosis:Ambulatory dysfunction   Patient Active Problem List    Diagnosis Date Noted   • Friction blisters of the soles 2023   • Calculus of gallbladder without cholecystitis without obstruction 2023   • Sepsis ([place] [address] ) 2023   • Diabetic foot ulcers ([place] [address] ) 2023   • Abnormal CT scan 2023   • Suspected pressure injury of deep tissue 03/15/2023   • Dyslipidemia 2023   • Insulin long-term use ([place] [address] ) 2023   • Type 2 diabetes mellitus with hyperglycemia ([place] [address] ) 2023   • Ambulatory dysfunction 2023   • Legally blind 2023   • Diarrhea 2023   • Bilateral lower extremity edema 2023   • Homelessness 2023   • Abnormal urinalysis 2023   • Weakness 2023   • Intertrigo 2023   • Leg numbness 2023   • Unsteadiness on feet 2022   • Chronic migraine without aura, not intractable, without status migrainosus 2022   • Obstructive sleep apnea 2022   • Diabetic neuropathy ([place] [address] ) 2022   • Stage 3a chronic kidney disease ([place] [address] ) 2022   • Polymyalgia rheumatica ([place] [address] ) 2021   • Gait instability 2021   • Ulnar neuropathy of right upper extremity    • Blurry vision, bilateral 2021   • Depressed mood 10/08/2020   • Type 2 diabetes mellitus with other specified complication ([place] [address] )    • Hypertension 05/10/2020   • Chronic migraine without aura without status migrainosus, not intractable 2020   • Hypersomnia 2020   • Migraine without aura and without status migrainosus, not intractable 2020   • Cerebral aneurysm without rupture 2020   • History of absence seizures 2020   • Thyroid nodule 2020   • Aneurysm ([facility] [address] ) 2020   • Type 2 diabetes mellitus with hyperglycemia, with long-term current use of insulin ([facility] [address] ) 02/21/2020   • Left cavernous carotid aneurysm 02/10/2020   • Polyneuropathy associated with underlying disease ([facility] [address] ) 01/07/2020   • PMR (polymyalgia rheumatica) ([facility] [address] ) 01/07/2020   • Thrombocytosis 01/07/2020   • Morbid obesity ([facility] [address] ) 09/19/2019   • Other inflammatory and immune myopathies, not elsewhere classified 09/16/2019   • Transaminitis 09/16/2019   • Frequent headaches 07/09/2019   • Type 2 diabetes mellitus with microalbuminuria, with long-term current use of insulin ([facility] ) 02/01/2019   • Mild intermittent asthma without complication 87/22/9892   • Orthostatic hypotension 06/25/2018   • Lung nodules 03/22/2018   • Exertional dyspnea 02/13/2018   • Enlarged pulmonary artery ([facility] [address] ) 02/13/2018   • Aortic dilatation ([facility] ) 02/13/2018   • Uncontrolled type 2 diabetes mellitus with hyperglycemia ([place] [address] )    • Seizures ([place] [address] )    • Obstructive sleep apnea (adult) (pediatric) 12/18/2017   • Prolonged QT interval 12/18/2017   • Decreased pulses in feet 12/11/2017   • Microalbuminuria 09/08/2015   • Vitamin D deficiency 06/06/2014   • Visual impairment in both eyes 12/06/2012   • Anemia 03/20/2012   • Hyperlipidemia 03/20/2012   • Class 3 severe obesity with serious comorbidity and body mass index (BMI) of 50 0 to 59 9 in adult [facility]) 03/20/2012   • Neuropathy of hand, right 03/20/2012      LOS (days): 6  Geometric Mean LOS (GMLOS) (days): 2 90  Days to GMLOS:-3     OBJECTIVE:  Risk of Unplanned Readmission Score: 32 41         Current admission status: Inpatient   Preferred Pharmacy:   [address] [address]  Phone: [phone] Fax: [phone]    [facility] ([address] [place] [address],   [address] [place] [address] [address] [address]  Phone: [phone] Fax: [phone]    Primary Care Provider: Myrtice Nageotte Marquis Pricila MD    Primary Insurance: [facility] DUAL COMPLETE MC REP  Secondary Insurance: [address] DETAILS:     Pt no longer accepted by [facility] unless she fills out a financial application  CM gave the patient a financial application  She was very upset kept repeating \"I don't have anything  \"     CM explained that  No facilities in the [place] will accept and CM would need to start looking [facility] or father [facility] or [place]  for STR  Pt was left with the financial application   CM will follow up in AM                                                                                                                       "

## 2023-05-16 PROBLEM — D72.829 LEUKOCYTOSIS: Status: ACTIVE | Noted: 2023-05-16

## 2023-05-16 LAB
ANION GAP SERPL CALCULATED.3IONS-SCNC: 2 MMOL/L (ref 4–13)
BASOPHILS # BLD AUTO: 0.06 THOUSANDS/ÂΜL (ref 0–0.1)
BASOPHILS NFR BLD AUTO: 1 % (ref 0–1)
BUN SERPL-MCNC: 18 MG/DL (ref 5–25)
CALCIUM SERPL-MCNC: 8.8 MG/DL (ref 8.3–10.1)
CHLORIDE SERPL-SCNC: 112 MMOL/L (ref 96–108)
CO2 SERPL-SCNC: 23 MMOL/L (ref 21–32)
CREAT SERPL-MCNC: 1.01 MG/DL (ref 0.6–1.3)
EOSINOPHIL # BLD AUTO: 0.33 THOUSAND/ÂΜL (ref 0–0.61)
EOSINOPHIL NFR BLD AUTO: 3 % (ref 0–6)
ERYTHROCYTE [DISTWIDTH] IN BLOOD BY AUTOMATED COUNT: 19.5 % (ref 11.6–15.1)
FERRITIN SERPL-MCNC: 35 NG/ML (ref 8–388)
GFR SERPL CREATININE-BSD FRML MDRD: 58 ML/MIN/1.73SQ M
GLUCOSE SERPL-MCNC: 139 MG/DL (ref 65–140)
GLUCOSE SERPL-MCNC: 142 MG/DL (ref 65–140)
GLUCOSE SERPL-MCNC: 163 MG/DL (ref 65–140)
GLUCOSE SERPL-MCNC: 223 MG/DL (ref 65–140)
GLUCOSE SERPL-MCNC: 233 MG/DL (ref 65–140)
HCT VFR BLD AUTO: 34.2 % (ref 34.8–46.1)
HGB BLD-MCNC: 10.5 G/DL (ref 11.5–15.4)
IMM GRANULOCYTES # BLD AUTO: 0.15 THOUSAND/UL (ref 0–0.2)
IMM GRANULOCYTES NFR BLD AUTO: 1 % (ref 0–2)
IRON SATN MFR SERPL: 17 % (ref 15–50)
IRON SERPL-MCNC: 43 UG/DL (ref 50–170)
LYMPHOCYTES # BLD AUTO: 3.21 THOUSANDS/ÂΜL (ref 0.6–4.47)
LYMPHOCYTES NFR BLD AUTO: 27 % (ref 14–44)
MCH RBC QN AUTO: 23.8 PG (ref 26.8–34.3)
MCHC RBC AUTO-ENTMCNC: 30.7 G/DL (ref 31.4–37.4)
MCV RBC AUTO: 77 FL (ref 82–98)
MONOCYTES # BLD AUTO: 0.76 THOUSAND/ÂΜL (ref 0.17–1.22)
MONOCYTES NFR BLD AUTO: 6 % (ref 4–12)
NEUTROPHILS # BLD AUTO: 7.41 THOUSANDS/ÂΜL (ref 1.85–7.62)
NEUTS SEG NFR BLD AUTO: 62 % (ref 43–75)
NRBC BLD AUTO-RTO: 0 /100 WBCS
PLATELET # BLD AUTO: 343 THOUSANDS/UL (ref 149–390)
PMV BLD AUTO: 11 FL (ref 8.9–12.7)
POTASSIUM SERPL-SCNC: 3.9 MMOL/L (ref 3.5–5.3)
RBC # BLD AUTO: 4.42 MILLION/UL (ref 3.81–5.12)
SODIUM SERPL-SCNC: 137 MMOL/L (ref 135–147)
TIBC SERPL-MCNC: 250 UG/DL (ref 250–450)
WBC # BLD AUTO: 11.92 THOUSAND/UL (ref 4.31–10.16)

## 2023-05-16 RX ADMIN — GABAPENTIN 100 MG: 100 CAPSULE ORAL at 08:08

## 2023-05-16 RX ADMIN — INSULIN LISPRO 2 UNITS: 100 INJECTION, SOLUTION INTRAVENOUS; SUBCUTANEOUS at 17:12

## 2023-05-16 RX ADMIN — BUDESONIDE AND FORMOTEROL FUMARATE DIHYDRATE 1 PUFF: 160; 4.5 AEROSOL RESPIRATORY (INHALATION) at 08:17

## 2023-05-16 RX ADMIN — INSULIN GLARGINE 26 UNITS: 100 INJECTION, SOLUTION SUBCUTANEOUS at 21:20

## 2023-05-16 RX ADMIN — HEPARIN SODIUM 7500 UNITS: 5000 INJECTION INTRAVENOUS; SUBCUTANEOUS at 21:20

## 2023-05-16 RX ADMIN — DOCUSATE SODIUM 100 MG: 100 CAPSULE, LIQUID FILLED ORAL at 08:09

## 2023-05-16 RX ADMIN — LOSARTAN POTASSIUM 100 MG: 50 TABLET, FILM COATED ORAL at 08:08

## 2023-05-16 RX ADMIN — BUDESONIDE AND FORMOTEROL FUMARATE DIHYDRATE 1 PUFF: 160; 4.5 AEROSOL RESPIRATORY (INHALATION) at 17:12

## 2023-05-16 RX ADMIN — DOCUSATE SODIUM 100 MG: 100 CAPSULE, LIQUID FILLED ORAL at 17:11

## 2023-05-16 RX ADMIN — METOPROLOL TARTRATE 50 MG: 50 TABLET, FILM COATED ORAL at 08:09

## 2023-05-16 RX ADMIN — PREDNISONE 4 MG: 1 TABLET ORAL at 08:12

## 2023-05-16 RX ADMIN — HEPARIN SODIUM 7500 UNITS: 5000 INJECTION INTRAVENOUS; SUBCUTANEOUS at 06:11

## 2023-05-16 RX ADMIN — HEPARIN SODIUM 7500 UNITS: 5000 INJECTION INTRAVENOUS; SUBCUTANEOUS at 14:31

## 2023-05-16 RX ADMIN — Medication 2000 UNITS: at 08:08

## 2023-05-16 RX ADMIN — ATORVASTATIN CALCIUM 40 MG: 40 TABLET, FILM COATED ORAL at 08:08

## 2023-05-16 RX ADMIN — METFORMIN HYDROCHLORIDE 1000 MG: 500 TABLET ORAL at 17:11

## 2023-05-16 RX ADMIN — TOPIRAMATE 100 MG: 100 TABLET, FILM COATED ORAL at 21:20

## 2023-05-16 RX ADMIN — AMLODIPINE BESYLATE 10 MG: 10 TABLET ORAL at 08:09

## 2023-05-16 RX ADMIN — METOPROLOL TARTRATE 50 MG: 50 TABLET, FILM COATED ORAL at 21:20

## 2023-05-16 RX ADMIN — METFORMIN HYDROCHLORIDE 1000 MG: 500 TABLET ORAL at 08:02

## 2023-05-16 RX ADMIN — GABAPENTIN 100 MG: 100 CAPSULE ORAL at 17:11

## 2023-05-16 NOTE — ASSESSMENT & PLAN NOTE
Chronic leukocytosis, likely related to daily prednisone 4 mg  VSS, does not appear infected  self-care

## 2023-05-16 NOTE — PROGRESS NOTES
Progress Notes - Family Medicine Residency, Rosa M Sr 1960, 57 y o  female  MRN: [id number]    Unit/Bed#: -01 Encounter: [id number]  Primary Care Provider: Sylvia Angelucci, MD      Admission Date: 5/8/2023 1114  Length of Stay: 7 days  Code Status:  Level 1 - Full Code  Consult:   IP CONSULT TO CASE MANAGEMENT  IP CONSULT TO PODIATRY      Assessments & Plans:   Plans discussed with [facility] team and finalization is pending attending physician Dr Mauro Mendoza, DO attestation  * Ambulatory dysfunction  Assessment & Plan  Patient presented to the ED for open blisters of the feet bilaterally, which were draining  History of uncontrolled diabetes, neuropathy, homelessness which is affecting her care  Patient was recently discharged from rehab last week  On physical exam, blisters do not appear to be infected   Vitals and labs otherwise WNL    Plan:  · PT/OT referral - recommend STR  · Podiatry consulted - WBAT, wound care  · CM referral  · Fall precautions     Uncontrolled type 2 diabetes mellitus with hyperglycemia [facility])  Assessment & Plan  Lab Results   Component Value Date    HGBA1C 11 2 (H) 04/19/2023       Recent Labs     05/15/23  1050 05/15/23  1601 05/15/23  2050 05/16/23  0709   POCGLU 165* 205* 128 142*       Blood Sugar Average: Last 72 hrs:  (P) 151 5   Uncontrolled DM with diabetic retinopathy (legally blind), CKD stage III  Home regimen: Lantus 25 units twice daily, Humalog 5 units 3 times daily with meals  Has not been on her diabetes medications since discharge from recent rehab  · Lantus 26 units qhs   · Continue SSI with glucose checks   · Continue Metformin 1000mg BID, tolerating well    Hypertension  Assessment & Plan  Last Blood Pressure: 470/85  Systolic (74WDB), DZE:223 , Min:131 , ACE:074   Diastolic (64HNW), PUH:59, Min:67, Max:76    Home regimen: Amlodipine 5mg daily, Losartan 100mg daily, Lopressor 100mg q12hrs, Lasix 20mg BID, Hydralazine 10mg TID  Was not taking her BP meds prior to admission due to multiple factors - homelessness, difficulty reading small print on pill bottles  · Continue Losartan 100mg daily   · Continue amlodipine 10mg daily  · Continue Lopressor 50mg q12hrs     Leukocytosis  Assessment & Plan  Chronic leukocytosis, likely related to daily prednisone 4 mg  VSS, does not appear infected  Friction blisters of the soles  Assessment & Plan  Stable on exam, does not appear infected  Also has history of uncontrolled DM and neuropathy  Local wound care  Podiatry placed wound care orders  Dyslipidemia  Assessment & Plan  Continue Lipitor 40mg daily    Homelessness  Assessment & Plan  Socially complex history, patient and her son became homeless back in February and the patient has had multiple ED visits, admissions to both hospital and STRs  Last discharged from rehab this past week, has been homeless since  Affecting her access to care and medications  · CM referral placed  · Patient would maybe benefit from pill packs on discharge given her blindness    Diabetic neuropathy ([facility] )  Assessment & Plan  Stable     Continue PTA Gabapentin 100mg BID    Stage 3a chronic kidney disease [facility])  Assessment & Plan  Lab Results   Component Value Date    EGFR 58 05/16/2023    EGFR 69 05/15/2023    EGFR 71 05/14/2023    CREATININE 1 01 05/16/2023    CREATININE 0 88 05/15/2023    CREATININE 0 86 05/14/2023     Stable renal function  Will continue to monitor     PMR (polymyalgia rheumatica) ([facility])  Assessment & Plan  Home regimen: prednisone 4mg daily, continue  Will need to continue outpatient follow-up    Seizures ([facility] )  Assessment & Plan  Hx of seizures and migraines  Continue PTA Topamax 100mg qhs      Patient Active Problem List   Diagnosis   • Uncontrolled type 2 diabetes mellitus with hyperglycemia ([facility])   • Seizures ([facility] )   • Exertional dyspnea   • Enlarged pulmonary artery ([facility])   • Aortic dilatation ([facility])   • Anemia   • Decreased pulses in feet   • Hyperlipidemia   • Microalbuminuria   • Obstructive sleep apnea (adult) (pediatric)   • Class 3 severe obesity with serious comorbidity and body mass index (BMI) of 50 0 to 59 9 in adult [facility])   • Neuropathy of hand, right   • Prolonged QT interval   • Visual impairment in both eyes   • Vitamin D deficiency   • Lung nodules   • Orthostatic hypotension   • Mild intermittent asthma without complication   • Type 2 diabetes mellitus with microalbuminuria, with long-term current use of insulin ([facility])   • Frequent headaches   • Other inflammatory and immune myopathies, not elsewhere classified   • Transaminitis   • Morbid obesity ([facility])   • Polyneuropathy associated with underlying disease ([facility])   • PMR (polymyalgia rheumatica) ([facility])   • Thrombocytosis   • Left cavernous carotid aneurysm   • Type 2 diabetes mellitus with hyperglycemia, with long-term current use of insulin ([facility])   • Aneurysm ([facility])   • Thyroid nodule   • History of absence seizures   • Hypersomnia   • Migraine without aura and without status migrainosus, not intractable   • Cerebral aneurysm without rupture   • Chronic migraine without aura without status migrainosus, not intractable   • Type 2 diabetes mellitus with other specified complication ([facility])   • Hypertension   • Depressed mood   • Blurry vision, bilateral   • Ulnar neuropathy of right upper extremity   • Polymyalgia rheumatica ([facility])   • Gait instability   • Stage 3a chronic kidney disease ([facility])   • Chronic migraine without aura, not intractable, without status migrainosus   • Obstructive sleep apnea   • Diabetic neuropathy ([facility])   • Unsteadiness on feet   • Abnormal urinalysis   • Weakness   • Intertrigo   • Leg numbness   • Homelessness   • Bilateral lower extremity edema   • Diarrhea   • Dyslipidemia   • Insulin long-term use ([facility])   • Type 2 diabetes mellitus with hyperglycemia ([facility])   • Ambulatory dysfunction   • Legally blind   • Suspected pressure injury of deep tissue   • Calculus of gallbladder without cholecystitis without obstruction   • Sepsis ([facility] )   • Diabetic foot ulcers (HCC)   • Abnormal CT scan   • Friction blisters of the soles   • Leukocytosis       Diet: Diet Adria/CHO Controlled; Consistent Carbohydrate Diet Level 2 (5 carb servings/75 grams CHO/meal)  VTE Pharm PPX: Heparin  VTE Mech PPX: sequential compression device      Subjective:   • Patient without acute events overnight per handoff  • No concern or report per nursing staff  • Patient seen and examined at bedside and without questions or concerns  • Patient offers no complaints  • Patient understands and agrees with plan      Vitals:     Vitals:    05/15/23 1545 05/15/23 2125 05/15/23 2206 05/16/23 0807   BP: 137/73 138/74 138/76 131/67   Pulse:  79 76 74   Resp: 18  20    Temp: 98 °F (36 7 °C)  98 °F (36 7 °C)    TempSrc:       SpO2:  98% 96% 95%     Temp:  [98 °F (36 7 °C)] 98 °F (36 7 °C)  HR:  [74-79] 74  Resp:  [18-20] 20  BP: (131-138)/(67-76) 131/67  Weight (last 2 days)     None        No intake or output data in the 24 hours ending 05/16/23 1126  Invasive Devices     Peripheral Intravenous Line  Duration           Peripheral IV 05/12/23 Right Antecubital 3 days                Labs:     CBC:  Results from last 7 days   Lab Units 05/16/23  0925 05/15/23  0513 05/14/23  0606 05/13/23  0552 05/12/23  1223 05/11/23  0511 05/10/23  0538   WBC Thousand/uL 11 92* 13 12* 11 85* 12 02* 11 00* 10 46* 9 34   HEMOGLOBIN g/dL 10 5* 10 3* 10 5* 10 4* 11 0* 10 5* 10 0*   HEMATOCRIT % 34 2* 33 2* 33 9* 33 8* 35 8 35 4 32 6*   PLATELETS Thousands/uL 343 333 309 325 366 337 304   NEUTROS ABS Thousands/µL 7 41 8 01* 7 20 7 10 7 58 6 48 5 23       CMP:  Results from last 7 days   Lab Units 05/16/23  0925 05/15/23  0513 05/14/23  0606 05/13/23  0552 05/12/23  1223 05/11/23  0511 05/10/23  0538   POTASSIUM mmol/L 3 9 3 6 3 9 3 7 4 2 3 5 3 5   CHLORIDE mmol/L 112* 114* 115* 114* 111* 112* 112*   CO2 mmol/L 23 24 23 25 22 23 23   BUN mg/dL 18 18 14 14 13 13 13   CREATININE mg/dL 1 01 0 88 0 86 1 00 1 23 0 92 0 92   CALCIUM mg/dL 8 8 9 3 9 4 9 5 9 9 9 4 8 9   EGFR ml/min/1 73sq m 58 69 71 59 46 66 66       Sepsis:        Micro:  Lab Results   Component Value Date/Time    Blood Culture No Growth After 5 Days  04/11/2023 06:48 PM    Blood Culture No Growth After 5 Days  04/11/2023 06:40 PM    Urine Culture >100,000 cfu/ml Candida glabrata (A) 02/28/2023 08:10 PM    Urine Culture 30,000-39,000 cfu/ml 02/28/2023 08:10 PM    C difficile toxin by PCR Negative 03/06/2023 07:52 PM       Imaging:   No results found  Medications:     Current Facility-Administered Medications   Medication Dose Route Frequency   • amLODIPine (NORVASC) tablet 10 mg  10 mg Oral Daily   • atorvastatin (LIPITOR) tablet 40 mg  40 mg Oral Daily   • budesonide-formoterol (SYMBICORT) 160-4 5 mcg/act inhaler 1 puff  1 puff Inhalation BID   • cholecalciferol (VITAMIN D3) tablet 2,000 Units  2,000 Units Oral Daily   • docusate sodium (COLACE) capsule 100 mg  100 mg Oral BID   • gabapentin (NEURONTIN) capsule 100 mg  100 mg Oral BID   • heparin (porcine) subcutaneous injection 7,500 Units  7,500 Units Subcutaneous Q8H [facility]   • insulin glargine (LANTUS) subcutaneous injection 26 Units 0 26 mL  26 Units Subcutaneous HS   • insulin lispro (HumaLOG) 100 units/mL subcutaneous injection 2-12 Units  2-12 Units Subcutaneous TID AC   • losartan (COZAAR) tablet 100 mg  100 mg Oral Daily   • metFORMIN (GLUCOPHAGE) tablet 1,000 mg  1,000 mg Oral BID With Meals   • metoprolol tartrate (LOPRESSOR) tablet 50 mg  50 mg Oral Q12H RONI   • predniSONE tablet 4 mg  4 mg Oral Daily   • topiramate (TOPAMAX) tablet 100 mg  100 mg Oral HS       Physical Exam:     General: Pt observed sitting comfortably in chair, NAD  Not toxic/ill-appearing  No cachectic or diaphoresis  No obvious sign of trauma or bleeding  Psych: able to converse appropriately  Neuro: no gross neurological deficits    Head: atraumatic, normocephalic  Eyes: open spontaneously, EOM intact, conjunctiva non-injected, no scleral icterus, no discharge  Ear: normal external ear, no visible drainage at external auditory orifice  Nose: no epistaxis, no rhinorrhea  Throat: no hoarse voice, no cough  Neck: normal ROM  Heart: RRR, no murmur/distant heart sound appreciated  Lungs: LCTABL, nml respiratory effort, no agonal/labored breathing, no accessory muscle use    Abdomen: soft, nontender, nondistended, normal bowel sound  Extremities: +1 pitting edema b/l     Alvaro Hameed,   PGY-2, Family Medicine  05/16/23  11:26 AM

## 2023-05-16 NOTE — UTILIZATION REVIEW
"Continued Stay Review    Date: 5/16                         Current Patient Class: Inpatient  Current Level of Care: Med Surg    HPI:62 y o  female initially admitted on 5/19     Assessment/Plan:   5/16  Progress notes; Podiatry consulted - WBAT, wound care  Continue home meds  Pt and her son became homeless back in February  Per Podiatry; Wound debrided at bedside today  Wound margins were debrided of any hyperkeratotic and macerated tissue, revealing healthy intact underlying epithelium  Wound base is fibrogranular  Post debridement measurements 2 5x2 1x0 2 cm  Less than 20 sq cm was debrided to the level of subcutaneous tissue  Continue local wound care consisting of Jay Soriano DSD to left heel wound  5/15  Per CM notes; Pt no longer accepted by [facility] unless she fills out a financial application  CM gave the patient a financial application  She was very upset kept repeating \"I don't have anything  \"   CM explained that  No facilities in the  will accept and [facility] or west of the  for STR  Pt was left with the financial application   CM will follow up in AM       Vital Signs:   05/16/23 15:13:59 97 9 °F (36 6 °C) 77 -- 123/69 87 98 % --   05/16/23 08:07:58 -- 74 -- 131/67 88 95 % --   05/16/23 0800 -- -- -- -- -- 96 %      Pertinent Labs/Diagnostic Results:   Results from last 7 days   Lab Units 05/11/23  1734   SARS-COV-2  Negative     Results from last 7 days   Lab Units 05/16/23  0925 05/15/23  0513 05/14/23  0606 05/13/23  0552 05/12/23  1223   WBC Thousand/uL 11 92* 13 12* 11 85* 12 02* 11 00*   HEMOGLOBIN g/dL 10 5* 10 3* 10 5* 10 4* 11 0*   HEMATOCRIT % 34 2* 33 2* 33 9* 33 8* 35 8   PLATELETS Thousands/uL 343 333 309 325 366   NEUTROS ABS Thousands/µL 7 41 8 01* 7 20 7 10 7 58         Results from last 7 days   Lab Units 05/16/23  0925 05/15/23  0513 05/14/23  0606 05/13/23  0552 05/12/23  1223   SODIUM mmol/L 137 139 139 140 " 137   POTASSIUM mmol/L 3 9 3 6 3 9 3 7 4 2   CHLORIDE mmol/L 112* 114* 115* 114* 111*   CO2 mmol/L 23 24 23 25 22   ANION GAP mmol/L 2* 1* 1* 1* 4   BUN mg/dL 18 18 14 14 13   CREATININE mg/dL 1 01 0 88 0 86 1 00 1 23   EGFR ml/min/1 73sq m 58 69 71 59 46   CALCIUM mg/dL 8 8 9 3 9 4 9 5 9 9         Results from last 7 days   Lab Units 05/16/23  1137 05/16/23  0709 05/15/23  2050 05/15/23  1601 05/15/23  1050 05/15/23  0630 05/14/23  2059 05/14/23  1636 05/14/23  0634 05/13/23  2119 05/13/23  1619 05/13/23  1121   POC GLUCOSE mg/dl 139 142* 128 205* 165* 142* 176* 117 114 171* 177* 142*     Results from last 7 days   Lab Units 05/16/23  0925 05/15/23  0513 05/14/23  0606 05/13/23  0552 05/12/23  1223 05/11/23  0511 05/10/23  0538   GLUCOSE RANDOM mg/dL 223* 107 127 139 176* 154* 171*             BETA-HYDROXYBUTYRATE   Date Value Ref Range Status   02/28/2023 1 4 (H) <0 6 mmol/L Final   02/09/2023 0 2 <0 6 mmol/L Final   03/05/2020 0 1 <0 6 mmol/L Final        Results from last 7 days   Lab Units 05/15/23  0513   FERRITIN ng/mL 35   IRON SATURATION % 17   IRON ug/dL 43*   TIBC ug/dL 250       Medications:   Scheduled Medications:  amLODIPine, 10 mg, Oral, Daily  atorvastatin, 40 mg, Oral, Daily  budesonide-formoterol, 1 puff, Inhalation, BID  cholecalciferol, 2,000 Units, Oral, Daily  docusate sodium, 100 mg, Oral, BID  gabapentin, 100 mg, Oral, BID  heparin (porcine), 7,500 Units, Subcutaneous, Q8H RONI  insulin glargine, 26 Units, Subcutaneous, HS  insulin lispro, 2-12 Units, Subcutaneous, TID AC  losartan, 100 mg, Oral, Daily  metFORMIN, 1,000 mg, Oral, BID With Meals  metoprolol tartrate, 50 mg, Oral, Q12H RONI  predniSONE, 4 mg, Oral, Daily  topiramate, 100 mg, Oral, HS      Continuous IV Infusions: None     PRN Meds: None       Discharge Plan: See above    Network Utilization Review Department  ATTENTION: Please call with any questions or concerns to [phone] and carefully listen to the prompts so that you are directed to the right person  All voicemails are confidential   [facility] all requests for admission clinical reviews, approved or denied determinations and any other requests to dedicated fax number below belonging to the campus where the patient is receiving treatment   List of dedicated fax numbers for the Facilities:  [address] DENIALS (Administrative/Medical Necessity) [phone]   [address] (Maternity/NICU/Pediatrics) [phone]   [address] [phone]   [facility] [address] [phone]   [address] [address] [facility] [phone]   15[address] [facility] Kelsi Pierce [facility] [place] [address] 815 [facility] [phone]

## 2023-05-16 NOTE — PROGRESS NOTES
Podiatry - Progress Note  Patient: Stone Lloyd 57 y o  female   MRN: [id number]  PCP: Demetria Castillo MD  Unit/Bed#: -01 Encounter: [id number]  Date Of Visit: 05/16/23    ASSESSMENT:    Stone Lloyd is a 57 y o  female with:    1  L diabetic heel ulcer  2  T2DM w/peripheral neuropathy  3  CKD3    PLAN:    · Plan to continue local wound care consisting of Magdalena Barton DSD to left heel wound  Appreciate nursing assistance with dressing changes  · Wound remains stable from podiatry perspective  No acute clinical signs of infection present  Will monitor weekly while in house  · Wound debrided at bedside today (see procedure note below)  · Patient states that she sometimes bears weight on her L foot without the heel offloading shoe on  Explained to patient that any time she stands or walks, she must be wearing this shoe as it will relieve pressure from her wound  Explained to patient that pressure and friction to wound can cause delayed or non-healing  · Elevation and offloading on green foam wedges or pillows when non-ambulatory  · Rest of care per primary team      Procedure: Wound debridement: After verbal consent obtained from patient, attention directed to left foot  Plantar heel wound surgically debrided of any fibrotic slough with 15 blade, revealing healthy bleeding granular tissue  Wound margins were debrided of any hyperkeratotic and macerated tissue, revealing healthy intact underlying epithelium  Wound base is fibrogranular  Post debridement measurements 2 5x2 1x0 2 cm  Less than 20 sq cm was debrided to the level of subcutaneous tissue  Patient tolerated the procedure well with no immediate complications  Weightbearing status: Weightbearing as tolerated in heel offloading shoe    SUBJECTIVE:     The patient was seen, evaluated, and assessed at bedside today  The patient was awake, alert, and in no acute distress  No acute events overnight  The patient reports no pain to left foot   She "reports occasional use of her heel offloading shoe  Patient denies N/V/F/chills/SOB/CP  OBJECTIVE:     Vitals:   /67   Pulse 74   Temp 98 °F (36 7 °C)   Resp 20   SpO2 95%     Temp (24hrs), Av °F (36 7 °C), Min:98 °F (36 7 °C), Max:98 °F (36 7 °C)      Physical Exam:     Lungs: Non labored breathing  Abdomen: Soft, non-tender  Lower Extremity:  Cardiovascular status at baseline from admission  Neurological status at baseline from admission  Musculoskeletal status at baseline from admission  No calf tenderness noted  Wound #: 1  Location: L plantar heel  Length 2 5cm: Width 2 1cm: Depth 0 2cm:   Deepest Tissue Noted in Base: subcutaneous  Probe to Bone: No  Peripheral Skin Description: Attached, slightly macerated  Granulation: 60% Fibrotic Tissue: 40% Necrotic Tissue: 0%   Drainage Amount: minimal  Signs of Infection: No    Clinical Images 23: Additional Data:     Labs:    Results from last 7 days   Lab Units 23  0925   WBC Thousand/uL 11 92*   HEMOGLOBIN g/dL 10 5*   HEMATOCRIT % 34 2*   PLATELETS Thousands/uL 343   NEUTROS PCT % 62   LYMPHS PCT % 27   MONOS PCT % 6   EOS PCT % 3     Results from last 7 days   Lab Units 23  0925   POTASSIUM mmol/L 3 9   CHLORIDE mmol/L 112*   CO2 mmol/L 23   BUN mg/dL 18   CREATININE mg/dL 1 01   CALCIUM mg/dL 8 8           * I Have Reviewed All Lab Data Listed Above  Recent Cultures (last 7 days):               Imaging: I have personally reviewed pertinent films in PACS  EKG, Pathology, and Other Studies: I have personally reviewed pertinent reports  ** Please Note: Portions of the record may have been created with voice recognition software  Occasional wrong word or \"sound a like\" substitutions may have occurred due to the inherent limitations of voice recognition software  Read the chart carefully and recognize, using context, where substitutions have occurred   **      "

## 2023-05-16 NOTE — DISCHARGE INSTR - OTHER ORDERS
Endocrinology providers in network with your insurance:    Left heel wound  Irrigate wound with Saline or cleanse with wound cleanser  Apply dermagran gauze DSD to wound base  Change dressing 3 x a week, do not leave open to air    Use globopedi shoe to walk for ADLs only  Use pillows or green foam wedge to offload heel when in bed

## 2023-05-16 NOTE — PLAN OF CARE
Problem: MOBILITY - ADULT  Goal: Maintain or return to baseline ADL function  Description: INTERVENTIONS:  -  Assess patient's ability to carry out ADLs; assess patient's baseline for ADL function and identify physical deficits which impact ability to perform ADLs (bathing, care of mouth/teeth, toileting, grooming, dressing, etc )  - Assess/evaluate cause of self-care deficits   - Assess range of motion  - Assess patient's mobility; develop plan if impaired  - Assess patient's need for assistive devices and provide as appropriate  - Encourage maximum independence but intervene and supervise when necessary  - Involve family in performance of ADLs  - Assess for home care needs following discharge   - Consider OT consult to assist with ADL evaluation and planning for discharge  - Provide patient education as appropriate  Outcome: Progressing     Problem: PAIN - ADULT  Goal: Verbalizes/displays adequate comfort level or baseline comfort level  Description: Interventions:  - Encourage patient to monitor pain and request assistance  - Assess pain using appropriate pain scale  - Administer analgesics based on type and severity of pain and evaluate response  - Implement non-pharmacological measures as appropriate and evaluate response  - Consider cultural and social influences on pain and pain management  - Notify physician/advanced practitioner if interventions unsuccessful or patient reports new pain  Outcome: Progressing     Problem: SAFETY ADULT  Goal: Maintain or return to baseline ADL function  Description: INTERVENTIONS:  -  Assess patient's ability to carry out ADLs; assess patient's baseline for ADL function and identify physical deficits which impact ability to perform ADLs (bathing, care of mouth/teeth, toileting, grooming, dressing, etc )  - Assess/evaluate cause of self-care deficits   - Assess range of motion  - Assess patient's mobility; develop plan if impaired  - Assess patient's need for assistive devices and provide as appropriate  - Encourage maximum independence but intervene and supervise when necessary  - Involve family in performance of ADLs  - Assess for home care needs following discharge   - Consider OT consult to assist with ADL evaluation and planning for discharge  - Provide patient education as appropriate  Outcome: Progressing     Problem: SKIN/TISSUE INTEGRITY - ADULT  Goal: Pressure injury heals and does not worsen  Description: Interventions:  - Implement low air loss mattress or specialty surface (Criteria met)  - Apply silicone foam dressing  - Instruct/assist with weight shifting every minutes when in chair   - Limit chair time to hour intervals  - Use special pressure reducing interventions such as when in chair   - Apply fecal or urinary incontinence containment device   - Perform passive or active ROM every   - Turn and reposition patient & offload bony prominences every  hours   - Utilize friction reducing device or surface for transfers   - Consider consults to  interdisciplinary teams such as   - Use incontinent care products after each incontinent episode such as   - Consider nutrition services referral as needed  Outcome: Progressing

## 2023-05-17 LAB
GLUCOSE SERPL-MCNC: 121 MG/DL (ref 65–140)
GLUCOSE SERPL-MCNC: 170 MG/DL (ref 65–140)
GLUCOSE SERPL-MCNC: 178 MG/DL (ref 65–140)
GLUCOSE SERPL-MCNC: 214 MG/DL (ref 65–140)
PREALB SERPL-MCNC: 16 MG/DL (ref 18–40)

## 2023-05-17 RX ADMIN — INSULIN LISPRO 2 UNITS: 100 INJECTION, SOLUTION INTRAVENOUS; SUBCUTANEOUS at 11:01

## 2023-05-17 RX ADMIN — BUDESONIDE AND FORMOTEROL FUMARATE DIHYDRATE 1 PUFF: 160; 4.5 AEROSOL RESPIRATORY (INHALATION) at 08:47

## 2023-05-17 RX ADMIN — HEPARIN SODIUM 7500 UNITS: 5000 INJECTION INTRAVENOUS; SUBCUTANEOUS at 05:38

## 2023-05-17 RX ADMIN — Medication 2000 UNITS: at 08:47

## 2023-05-17 RX ADMIN — ATORVASTATIN CALCIUM 40 MG: 40 TABLET, FILM COATED ORAL at 08:47

## 2023-05-17 RX ADMIN — INSULIN GLARGINE 26 UNITS: 100 INJECTION, SOLUTION SUBCUTANEOUS at 21:01

## 2023-05-17 RX ADMIN — GABAPENTIN 100 MG: 100 CAPSULE ORAL at 17:07

## 2023-05-17 RX ADMIN — PREDNISONE 4 MG: 1 TABLET ORAL at 08:47

## 2023-05-17 RX ADMIN — DOCUSATE SODIUM 100 MG: 100 CAPSULE, LIQUID FILLED ORAL at 08:47

## 2023-05-17 RX ADMIN — LOSARTAN POTASSIUM 100 MG: 50 TABLET, FILM COATED ORAL at 08:47

## 2023-05-17 RX ADMIN — BUDESONIDE AND FORMOTEROL FUMARATE DIHYDRATE 1 PUFF: 160; 4.5 AEROSOL RESPIRATORY (INHALATION) at 17:07

## 2023-05-17 RX ADMIN — HEPARIN SODIUM 7500 UNITS: 5000 INJECTION INTRAVENOUS; SUBCUTANEOUS at 21:01

## 2023-05-17 RX ADMIN — GABAPENTIN 100 MG: 100 CAPSULE ORAL at 08:47

## 2023-05-17 RX ADMIN — DOCUSATE SODIUM 100 MG: 100 CAPSULE, LIQUID FILLED ORAL at 17:07

## 2023-05-17 RX ADMIN — METOPROLOL TARTRATE 50 MG: 50 TABLET, FILM COATED ORAL at 20:49

## 2023-05-17 RX ADMIN — TOPIRAMATE 100 MG: 100 TABLET, FILM COATED ORAL at 21:01

## 2023-05-17 RX ADMIN — AMLODIPINE BESYLATE 10 MG: 10 TABLET ORAL at 08:47

## 2023-05-17 RX ADMIN — METFORMIN HYDROCHLORIDE 1000 MG: 500 TABLET ORAL at 17:07

## 2023-05-17 RX ADMIN — METFORMIN HYDROCHLORIDE 1000 MG: 500 TABLET ORAL at 08:47

## 2023-05-17 RX ADMIN — METOPROLOL TARTRATE 50 MG: 50 TABLET, FILM COATED ORAL at 08:47

## 2023-05-17 NOTE — PHYSICAL THERAPY NOTE
Physical Therapy Progress Note       05/17/23 1230   PT Last Visit   PT Visit Date 05/17/23   Note Type   Note Type Treatment   Pain Assessment   Pain Assessment Tool 0-10   Pain Score No Pain   Restrictions/Precautions   RLE Weight Bearing Per Order WBAT  (In surgical shoe )   LLE Weight Bearing Per Order WBAT  (In heel offloading shoe )   Other Precautions Fall Risk;Pain;WBS   Subjective   Subjective The patient was expressive about her situation and how she feels that she is being pressured to leave the hospital  She states that she is leaving, and that she does not want to leave the area because her son does not want to leave  Transfers   Stand to Sit 5  Supervision   Ambulation/Elevation   Gait pattern Excessively slow; Step to;Short stride;Decreased foot clearance   Gait Assistance 5  Supervision   Assistive Device Rolling walker   Distance 40 feet  Balance   Static Sitting Good   Dynamic Sitting Fair +   Static Standing Fair   Ambulatory Fair -   Activity Tolerance   Activity Tolerance Patient tolerated treatment well   Assessment   Prognosis Good   Problem List Decreased strength; Impaired balance;Decreased range of motion;Decreased endurance;Decreased mobility;Pain   Assessment The patient has improving balance as she is ambulating without physical assistance  She continues to require extended time for all mobility, but she was able to stand for over 11 minutes without difficulty  The patient was very expressive about her situation, and she is frustrated with not having a place to go to  Will continue to follow  Goals   Patient Goals To find a place to go to  STG Expiration Date 05/23/23   PT Treatment Day 1   Plan   Treatment/Interventions Functional transfer training; Therapeutic exercise; Endurance training;LE strengthening/ROM; Patient/family training;Bed mobility;Gait training;Elevations   Progress Progressing toward goals   PT Frequency 2-3x/wk   Recommendation   PT Discharge Recommendation Post acute rehabilitation services   AM-PAC Basic Mobility Inpatient   Turning in Flat Bed Without Bedrails 3   Lying on Back to Sitting on Edge of Flat Bed Without Bedrails 3   Moving Bed to Chair 3   Standing Up From Chair Using Arms 3   Walk in Room 3   Climb 3-5 Stairs With Railing 1   Basic Mobility Inpatient Raw Score 16   Basic Mobility Standardized Score 38 32   Highest Level Of Mobility   -HLM Goal 5: Stand one or more mins   JH-HLM Achieved 7: Walk 25 feet or more         An AM-PAC Basic Mobility raw score less than 16 suggests the patient may benefit from discharge to post-acute rehab services      Bo Costa PTA
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
## 2023-05-17 NOTE — PROGRESS NOTES
[facility]  Progress Note  Name: Bakari Hood  MRN: [id number]  Unit/Bed#: -01 I Date of Admission: 5/8/2023   Date of Service: 5/17/2023 I Hospital Day: 8    Assessment/Plan   Leukocytosis  Assessment & Plan  Chronic leukocytosis, likely related to daily prednisone 4 mg  VSS, does not appear infected  Friction blisters of the soles  Assessment & Plan  Stable on exam, does not appear infected  Also has history of uncontrolled DM and neuropathy  Local wound care  Podiatry placed wound care orders  Dyslipidemia  Assessment & Plan  Continue Lipitor 40mg daily     Homelessness  Assessment & Plan  Socially complex history, patient and her son became homeless back in February and the patient has had multiple ED visits, admissions to both hospital and STRs  Last discharged from rehab this past week, has been homeless since  Affecting her access to care and medications  · CM referral placed  · Patient would maybe benefit from pill packs on discharge given her blindness     Diabetic neuropathy ([address] )  Assessment & Plan  Stable      Continue PTA Gabapentin 100mg BID    Stage 3a chronic kidney disease [facility])  Assessment & Plan  Lab Results   Component Value Date    EGFR 58 05/16/2023    EGFR 69 05/15/2023    EGFR 71 05/14/2023    CREATININE 1 01 05/16/2023    CREATININE 0 88 05/15/2023    CREATININE 0 86 05/14/2023     Stable renal function   Will continue to monitor     Hypertension  Assessment & Plan  Last Blood Pressure: 219/14  Systolic (36DVI), CHM:704 , Min:123 , CKI:129   Diastolic (94HXT), LLS:08, Min:69, Max:80    Home regimen: Amlodipine 5mg daily, Losartan 100mg daily, Lopressor 100mg q12hrs, Lasix 20mg BID, Hydralazine 10mg TID  Was not taking her BP meds prior to admission due to multiple factors - homelessness, difficulty reading small print on pill bottles  · Continue Losartan 100mg daily   · Continue amlodipine 10mg daily  · Continue Lopressor 50mg q12hrs       PMR (polymyalgia rheumatica) ([facility])  Assessment & Plan  Home regimen: prednisone 4mg daily, continue  Will need to continue outpatient follow-up     Seizures ([address] )  Assessment & Plan  Hx of seizures and migraines  Continue PTA Topamax 100mg qhs     Uncontrolled type 2 diabetes mellitus with hyperglycemia [facility])  Assessment & Plan  Lab Results   Component Value Date    HGBA1C 11 2 (H) 04/19/2023       Recent Labs     05/16/23  1137 05/16/23  1559 05/16/23  2056 05/17/23  0622   POCGLU 139 163* 233* 121       Blood Sugar Average: Last 72 hrs:  (P) 153 75   Uncontrolled DM with diabetic retinopathy (legally blind), CKD stage III  Home regimen: Lantus 25 units twice daily, Humalog 5 units 3 times daily with meals  Has not been on her diabetes medications since discharge from recent rehab  · Lantus 26 units qhs   · Continue SSI with glucose checks   · Continue Metformin 1000mg BID, tolerating well     * Ambulatory dysfunction  Assessment & Plan  Patient presented to the ED for open blisters of the feet bilaterally, which were draining  History of uncontrolled diabetes, neuropathy, homelessness which is affecting her care  Patient was recently discharged from rehab last week  On physical exam, blisters do not appear to be infected  Vitals and labs otherwise WNL    Plan:  · PT/OT referral - recommend STR  · Podiatry consulted - WBAT, wound care  · Stable for DC per podiatry  · CM referral  · Fall precautions          PPX: Heparin  Diet: CalCho controlled  Code Status: Full  Dispo: Pending case management    Plan D/W Dr Brianne Addison and [facility] Team    Subjective:   Patient seen and assessed at bedside this AM, no ovn events, currently feeling well  Is content with treatment she received yesterday, believes podiatrist was very thorough and polite  She understands she may be ok to leave but is concerned that her foot will only get worse if she must return to the street       Objective:     Vitals: Blood pressure 150/80, pulse 73, temperature 98 5 °F (36 9 °C), resp  rate 20, SpO2 95 %, not currently breastfeeding  ,There is no height or weight on file to calculate BMI  Intake/Output Summary (Last 24 hours) at 5/17/2023 0825  Last data filed at 5/16/2023 1700  Gross per 24 hour   Intake 120 ml   Output --   Net 120 ml       Physical Exam:   Physical Exam  Constitutional:       General: She is not in acute distress  Appearance: She is obese  HENT:      Head: Normocephalic and atraumatic  Cardiovascular:      Rate and Rhythm: Normal rate and regular rhythm  Heart sounds: Normal heart sounds  No murmur heard  Pulmonary:      Effort: No respiratory distress  Breath sounds: Normal breath sounds  No wheezing  Abdominal:      General: There is no distension  Palpations: Abdomen is soft  Neurological:      General: No focal deficit present  Mental Status: She is alert  Mental status is at baseline  Psychiatric:         Mood and Affect: Mood normal        Invasive Devices     None                            Lab and other studies:  I have personally reviewed pertinent reports  No results displayed because visit has over 200 results            Recent Results (from the past 24 hour(s))   CBC and differential    Collection Time: 05/16/23  9:25 AM   Result Value Ref Range    WBC 11 92 (H) 4 31 - 10 16 Thousand/uL    RBC 4 42 3 81 - 5 12 Million/uL    Hemoglobin 10 5 (L) 11 5 - 15 4 g/dL    Hematocrit 34 2 (L) 34 8 - 46 1 %    MCV 77 (L) 82 - 98 fL    MCH 23 8 (L) 26 8 - 34 3 pg    MCHC 30 7 (L) 31 4 - 37 4 g/dL    RDW 19 5 (H) 11 6 - 15 1 %    MPV 11 0 8 9 - 12 7 fL    Platelets 759 012 - 020 Thousands/uL    nRBC 0 /100 WBCs    Neutrophils Relative 62 43 - 75 %    Immat GRANS % 1 0 - 2 %    Lymphocytes Relative 27 14 - 44 %    Monocytes Relative 6 4 - 12 %    Eosinophils Relative 3 0 - 6 %    Basophils Relative 1 0 - 1 %    Neutrophils Absolute 7 41 1 85 - 7 62 Thousands/µL    Immature Grans Absolute 0 15 0 00 - 0 20 Thousand/uL    Lymphocytes Absolute 3 21 0 60 - 4 47 Thousands/µL    Monocytes Absolute 0 76 0 17 - 1 22 Thousand/µL    Eosinophils Absolute 0 33 0 00 - 0 61 Thousand/µL    Basophils Absolute 0 06 0 00 - 0 10 Thousands/µL   Basic metabolic panel    Collection Time: 05/16/23  9:25 AM   Result Value Ref Range    Sodium 137 135 - 147 mmol/L    Potassium 3 9 3 5 - 5 3 mmol/L    Chloride 112 (H) 96 - 108 mmol/L    CO2 23 21 - 32 mmol/L    ANION GAP 2 (L) 4 - 13 mmol/L    BUN 18 5 - 25 mg/dL    Creatinine 1 01 0 60 - 1 30 mg/dL    Glucose 223 (H) 65 - 140 mg/dL    Calcium 8 8 8 3 - 10 1 mg/dL    eGFR 58 ml/min/1 73sq m   Fingerstick Glucose (POCT)    Collection Time: 05/16/23 11:37 AM   Result Value Ref Range    POC Glucose 139 65 - 140 mg/dl   Fingerstick Glucose (POCT)    Collection Time: 05/16/23  3:59 PM   Result Value Ref Range    POC Glucose 163 (H) 65 - 140 mg/dl   Fingerstick Glucose (POCT)    Collection Time: 05/16/23  8:56 PM   Result Value Ref Range    POC Glucose 233 (H) 65 - 140 mg/dl   Fingerstick Glucose (POCT)    Collection Time: 05/17/23  6:22 AM   Result Value Ref Range    POC Glucose 121 65 - 140 mg/dl     Blood Culture:   Lab Results   Component Value Date    BLOODCX No Growth After 5 Days  04/11/2023   ,   Urinalysis:   Lab Results   Component Value Date    COLORU Yellow 04/11/2023    COLORU Yellow 05/06/2014    CLARITYU Clear 04/11/2023    CLARITYU Clear 05/06/2014    SPECGRAV 1 015 04/11/2023    SPECGRAV 1 010 05/06/2014    PHUR 6 0 04/11/2023    PHUR 6 0 02/28/2023    PHUR 5 5 05/06/2014    LEUKOCYTESUR (A) 04/11/2023     Elevated glucose may cause decreased leukocyte values   See urine microscopic for [facility] result/    LEUKOCYTESUR Negative 05/06/2014    NITRITE Negative 04/11/2023    NITRITE Negative 05/06/2014    PROTEINUA 100 (2+) (A) 05/06/2014    GLUCOSEU >=1000 (1%) (A) 04/11/2023    GLUCOSEU 500 (1/2%) (A) 05/06/2014    KETONESU Negative 04/11/2023    KETONESU Negative 05/06/2014 BILIRUBINUR Negative 04/11/2023    BILIRUBINUR Negative 05/06/2014    BLOODU Small (A) 04/11/2023    BLOODU Trace (A) 05/06/2014   ,   Urine Culture:   Lab Results   Component Value Date    URINECX >100,000 cfu/ml Candida glabrata (A) 02/28/2023    URINECX 30,000-39,000 cfu/ml 02/28/2023   ,   Wound Culure: No results found for: WOUNDCULT    Imaging:    No orders to display     VTE Pharmacologic Prophylaxis: Heparin  VTE Mechanical Prophylaxis: sequential compression device    Current Facility-Administered Medications   Medication Dose Route Frequency   • amLODIPine (NORVASC) tablet 10 mg  10 mg Oral Daily   • atorvastatin (LIPITOR) tablet 40 mg  40 mg Oral Daily   • budesonide-formoterol (SYMBICORT) 160-4 5 mcg/act inhaler 1 puff  1 puff Inhalation BID   • cholecalciferol (VITAMIN D3) tablet 2,000 Units  2,000 Units Oral Daily   • docusate sodium (COLACE) capsule 100 mg  100 mg Oral BID   • gabapentin (NEURONTIN) capsule 100 mg  100 mg Oral BID   • heparin (porcine) subcutaneous injection 7,500 Units  7,500 Units Subcutaneous Q8H [address]   • insulin glargine (LANTUS) subcutaneous injection 26 Units 0 26 mL  26 Units Subcutaneous HS   • insulin lispro (HumaLOG) 100 units/mL subcutaneous injection 2-12 Units  2-12 Units Subcutaneous TID AC   • losartan (COZAAR) tablet 100 mg  100 mg Oral Daily   • metFORMIN (GLUCOPHAGE) tablet 1,000 mg  1,000 mg Oral BID With Meals   • metoprolol tartrate (LOPRESSOR) tablet 50 mg  50 mg Oral Q12H RONI   • predniSONE tablet 4 mg  4 mg Oral Daily   • topiramate (TOPAMAX) tablet 100 mg  100 mg Oral HS     Rao Gonzalez MD  Family Medicine Resident PGY1

## 2023-05-17 NOTE — PLAN OF CARE
Problem: PHYSICAL THERAPY ADULT  Goal: Performs mobility at highest level of function for planned discharge setting  See evaluation for individualized goals  Description: Treatment/Interventions: Functional transfer training, LE strengthening/ROM, Therapeutic exercise, Endurance training, Patient/family training, Equipment eval/education, Gait training, Bed mobility, Spoke to nursing          See flowsheet documentation for full assessment, interventions and recommendations  Outcome: Progressing  Note: Prognosis: Good  Problem List: Decreased strength, Impaired balance, Decreased range of motion, Decreased endurance, Decreased mobility, Pain  Assessment: The patient has improving balance as she is ambulating without physical assistance  She continues to require extended time for all mobility, but she was able to stand for over 11 minutes without difficulty  The patient was very expressive about her situation, and she is frustrated with not having a place to go to  Will continue to follow  Barriers to Discharge: Inaccessible home environment, Decreased caregiver support  Barriers to Discharge Comments: T+14  PT Discharge Recommendation: Post acute rehabilitation services    See flowsheet documentation for full assessment

## 2023-05-18 VITALS
TEMPERATURE: 98.1 F | SYSTOLIC BLOOD PRESSURE: 131 MMHG | HEART RATE: 72 BPM | HEIGHT: 68 IN | DIASTOLIC BLOOD PRESSURE: 67 MMHG | BODY MASS INDEX: 45.59 KG/M2 | OXYGEN SATURATION: 95 % | RESPIRATION RATE: 18 BRPM

## 2023-05-18 LAB
GLUCOSE SERPL-MCNC: 134 MG/DL (ref 65–140)
GLUCOSE SERPL-MCNC: 149 MG/DL (ref 65–140)
GLUCOSE SERPL-MCNC: 169 MG/DL (ref 65–140)

## 2023-05-18 RX ORDER — PREDNISONE 1 MG/1
4 TABLET ORAL DAILY
Qty: 120 TABLET | Refills: 0 | Status: SHIPPED | OUTPATIENT
Start: 2023-05-18 | End: 2023-05-22 | Stop reason: SDUPTHER

## 2023-05-18 RX ORDER — INSULIN GLARGINE 100 [IU]/ML
INJECTION, SOLUTION SUBCUTANEOUS
Qty: 15.3 ML | Refills: 0 | Status: SHIPPED | OUTPATIENT
Start: 2023-05-18 | End: 2023-05-22 | Stop reason: SDUPTHER

## 2023-05-18 RX ADMIN — METOPROLOL TARTRATE 50 MG: 50 TABLET, FILM COATED ORAL at 08:41

## 2023-05-18 RX ADMIN — Medication 2000 UNITS: at 08:41

## 2023-05-18 RX ADMIN — DOCUSATE SODIUM 100 MG: 100 CAPSULE, LIQUID FILLED ORAL at 18:12

## 2023-05-18 RX ADMIN — AMLODIPINE BESYLATE 10 MG: 10 TABLET ORAL at 08:41

## 2023-05-18 RX ADMIN — METFORMIN HYDROCHLORIDE 1000 MG: 500 TABLET ORAL at 18:12

## 2023-05-18 RX ADMIN — GABAPENTIN 100 MG: 100 CAPSULE ORAL at 18:12

## 2023-05-18 RX ADMIN — METFORMIN HYDROCHLORIDE 1000 MG: 500 TABLET ORAL at 08:43

## 2023-05-18 RX ADMIN — ATORVASTATIN CALCIUM 40 MG: 40 TABLET, FILM COATED ORAL at 08:41

## 2023-05-18 RX ADMIN — DOCUSATE SODIUM 100 MG: 100 CAPSULE, LIQUID FILLED ORAL at 08:41

## 2023-05-18 RX ADMIN — HEPARIN SODIUM 7500 UNITS: 5000 INJECTION INTRAVENOUS; SUBCUTANEOUS at 06:19

## 2023-05-18 RX ADMIN — GABAPENTIN 100 MG: 100 CAPSULE ORAL at 08:41

## 2023-05-18 RX ADMIN — PREDNISONE 4 MG: 1 TABLET ORAL at 08:42

## 2023-05-18 RX ADMIN — BUDESONIDE AND FORMOTEROL FUMARATE DIHYDRATE 1 PUFF: 160; 4.5 AEROSOL RESPIRATORY (INHALATION) at 08:41

## 2023-05-18 RX ADMIN — LOSARTAN POTASSIUM 100 MG: 50 TABLET, FILM COATED ORAL at 08:41

## 2023-05-18 NOTE — ASSESSMENT & PLAN NOTE
Socially complex history, patient and her son became homeless back in February and the patient has had multiple ED visits, admissions to both hospital and STRs  Last discharged from rehab this past week, has been homeless since     Affecting her access to care and medications  · CM following for placement- follow-up for updates  · Patient would maybe benefit from pill packs on discharge given her blindness

## 2023-05-18 NOTE — ASSESSMENT & PLAN NOTE
Last Blood Pressure: 479/13  Systolic (29INA), TCU:361 , Min:131 , JSH:828   Diastolic (13ZZC), AHJ:83, Min:67, Max:73    Home regimen: Amlodipine 5mg daily, Losartan 100mg daily, Lopressor 100mg q12hrs, Lasix 20mg BID, Hydralazine 10mg TID  Was not taking her BP meds prior to admission due to multiple factors - homelessness, difficulty reading small print on pill bottles  · Continue Losartan 100mg daily   · Continue amlodipine 10mg daily  · Continue Lopressor 50mg q12hrs

## 2023-05-18 NOTE — PLAN OF CARE
Problem: PHYSICAL THERAPY ADULT  Goal: Performs mobility at highest level of function for planned discharge setting  See evaluation for individualized goals  Description: Treatment/Interventions: Functional transfer training, LE strengthening/ROM, Therapeutic exercise, Endurance training, Patient/family training, Equipment eval/education, Gait training, Bed mobility, Spoke to nursing          See flowsheet documentation for full assessment, interventions and recommendations  Outcome: Progressing  Note: Prognosis: Good  Problem List: Decreased strength, Impaired balance, Decreased range of motion, Decreased endurance, Decreased mobility, Pain  Assessment: The patient is demonstrating improved activity tolerance, but she continues to have a very slow ana  She is able to stand for extended periods of time without overt signs of fatigue  She continues to express her challenges as well as her frustrations with them  Will continue to follow  Barriers to Discharge: Inaccessible home environment, Decreased caregiver support  Barriers to Discharge Comments: T+14  PT Discharge Recommendation: Post acute rehabilitation services    See flowsheet documentation for full assessment
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
## 2023-05-18 NOTE — PROGRESS NOTES
Progress Notes - Family Medicine Residency, Marilee Rivera 1960, 57 y o  female  MRN: [id number]    Unit/Bed#: -01 Encounter: [id number]  Primary Care Provider: Gomez Kyle MD      Admission Date: 5/8/2023 1114  Length of Stay: 9 days  Code Status:  Level 1 - Full Code  Consult:   IP CONSULT TO CASE MANAGEMENT  IP CONSULT TO PODIATRY      Assessments & Plans:   Plans discussed with [facility] team and finalization is pending attending physician  attestation  * Ambulatory dysfunction  Assessment & Plan  Patient presented to the ED for open blisters of the feet bilaterally, which were draining  History of uncontrolled diabetes, neuropathy, homelessness which is affecting her care  Patient was recently discharged from rehab last week  On physical exam, blisters do not appear to be infected  Vitals and labs otherwise WNL    Plan:  · PT/OT referral - recommend STR  · Podiatry consulted - WBAT, wound care  · Stable for DC per podiatry  · CM referral  · Fall precautions     Uncontrolled type 2 diabetes mellitus with hyperglycemia [facility])  Assessment & Plan  Lab Results   Component Value Date    HGBA1C 11 2 (H) 04/19/2023       Recent Labs     05/17/23  1033 05/17/23  1621 05/17/23  2031 05/18/23  0631   POCGLU 178* 170* 214* 134       Blood Sugar Average: Last 72 hrs:  (P) 758 [id number]   Blood sugars within goal  Uncontrolled DM with diabetic retinopathy (legally blind), CKD stage III  Home regimen: Lantus 25 units twice daily, Humalog 5 units 3 times daily with meals  Has not been on her diabetes medications since discharge from recent rehab  · Lantus 26 units qhs    · Continue Metformin 1000mg BID, tolerating well     Leukocytosis  Assessment & Plan  Chronic leukocytosis, likely related to daily prednisone 4 mg  VSS, does not appear infected         Friction blisters of the soles  Assessment & Plan  Stable on exam, does not appear infected  Also has history of uncontrolled DM and neuropathy  Local wound care  Podiatry placed wound care orders  Dyslipidemia  Assessment & Plan  Continue Lipitor 40mg daily      Homelessness  Assessment & Plan  Socially complex history, patient and her son became homeless back in February and the patient has had multiple ED visits, admissions to both hospital and STRs  Last discharged from rehab this past week, has been homeless since  Affecting her access to care and medications  · CM following for placement- follow-up for updates  · Patient would maybe benefit from pill packs on discharge given her blindness     Diabetic neuropathy ([facility] [address] )  Assessment & Plan  Stable      Continue PTA Gabapentin 100mg BID     Stage 3a chronic kidney disease [facility])  Assessment & Plan  Lab Results   Component Value Date    EGFR 58 05/16/2023    EGFR 69 05/15/2023    EGFR 71 05/14/2023    CREATININE 1 01 05/16/2023    CREATININE 0 88 05/15/2023    CREATININE 0 86 05/14/2023     Stable renal function       Hypertension  Assessment & Plan  Last Blood Pressure: 674/10  Systolic (73YGC), DVA:970 , Min:131 , DGI:300   Diastolic (21CRV), DII:88, Min:67, Max:73    Home regimen: Amlodipine 5mg daily, Losartan 100mg daily, Lopressor 100mg q12hrs, Lasix 20mg BID, Hydralazine 10mg TID  Was not taking her BP meds prior to admission due to multiple factors - homelessness, difficulty reading small print on pill bottles  · Continue Losartan 100mg daily   · Continue amlodipine 10mg daily  · Continue Lopressor 50mg q12hrs        PMR (polymyalgia rheumatica) ([facility])  Assessment & Plan  Home regimen: prednisone 4mg daily, continue  Will need to continue outpatient follow-up      Seizures ([facility] )  Assessment & Plan  Hx of seizures and migraines  Continue PTA Topamax 100mg qhs        Patient Active Problem List   Diagnosis   • Uncontrolled type 2 diabetes mellitus with hyperglycemia ([facility] [address] )   • Seizures ([facility] [address] )   • Exertional dyspnea   • Enlarged pulmonary artery ([facility])   • Aortic dilatation ([facility])   • Anemia   • Decreased pulses in feet   • Hyperlipidemia   • Microalbuminuria   • Obstructive sleep apnea (adult) (pediatric)   • Class 3 severe obesity with serious comorbidity and body mass index (BMI) of 50 0 to 59 9 in adult [facility])   • Neuropathy of hand, right   • Prolonged QT interval   • Visual impairment in both eyes   • Vitamin D deficiency   • Lung nodules   • Orthostatic hypotension   • Mild intermittent asthma without complication   • Type 2 diabetes mellitus with microalbuminuria, with long-term current use of insulin ([facility])   • Frequent headaches   • Other inflammatory and immune myopathies, not elsewhere classified   • Transaminitis   • Morbid obesity ([facility])   • Polyneuropathy associated with underlying disease ([facility])   • PMR (polymyalgia rheumatica) ([facility])   • Thrombocytosis   • Left cavernous carotid aneurysm   • Type 2 diabetes mellitus with hyperglycemia, with long-term current use of insulin ([facility])   • Aneurysm ([facility])   • Thyroid nodule   • History of absence seizures   • Hypersomnia   • Migraine without aura and without status migrainosus, not intractable   • Cerebral aneurysm without rupture   • Chronic migraine without aura without status migrainosus, not intractable   • Type 2 diabetes mellitus with other specified complication ([facility])   • Hypertension   • Depressed mood   • Blurry vision, bilateral   • Ulnar neuropathy of right upper extremity   • Polymyalgia rheumatica ([facility])   • Gait instability   • Stage 3a chronic kidney disease ([facility])   • Chronic migraine without aura, not intractable, without status migrainosus   • Obstructive sleep apnea   • Diabetic neuropathy ([facility])   • Unsteadiness on feet   • Abnormal urinalysis   • Weakness   • Intertrigo   • Leg numbness   • Homelessness   • Bilateral lower extremity edema   • Diarrhea   • Dyslipidemia   • Insulin long-term use ([facility])   • Type 2 diabetes mellitus with hyperglycemia ([facility])   • Ambulatory dysfunction   • Legally blind   • Suspected pressure injury of deep tissue   • Calculus of gallbladder without cholecystitis without obstruction   • Sepsis ([address] )   • Diabetic foot ulcers (HCC)   • Abnormal CT scan   • Friction blisters of the soles   • Leukocytosis       Diet: Diet Adria/CHO Controlled; Consistent Carbohydrate Diet Level 2 (5 carb servings/75 grams CHO/meal)  VTE Pharm PPX: Heparin  VTE Mech PPX: sequential compression device      Subjective:   • Patient without acute events overnight per handoff  • No concern or report per nursing staff  • Patient seen and examined at bedside and without questions or concerns  Patient denies chest pain, dizziness, shortness of breath, palpitations or headaches  • Patient understands and agrees with plan      Vitals:     Vitals:    05/17/23 1525 05/17/23 2048 05/17/23 2210 05/18/23 0732   BP: 142/73 143/72 142/71 131/67   Pulse:  77 77 72   Resp: 18      Temp: 98 2 °F (36 8 °C) 98 2 °F (36 8 °C) 98 1 °F (36 7 °C)    TempSrc:       SpO2:  97% 95% 95%     Temp:  [98 1 °F (36 7 °C)-98 2 °F (36 8 °C)] 98 1 °F (36 7 °C)  HR:  [72-77] 72  Resp:  [18] 18  BP: (131-143)/(67-73) 131/67  Weight (last 2 days)     None          Intake/Output Summary (Last 24 hours) at 5/18/2023 0805  Last data filed at 5/17/2023 1100  Gross per 24 hour   Intake 480 ml   Output --   Net 480 ml     Invasive Devices     None                 Labs:     CBC:  Results from last 7 days   Lab Units 05/16/23  0925 05/15/23  0513 05/14/23  0606 05/13/23  0552 05/12/23  1223   WBC Thousand/uL 11 92* 13 12* 11 85* 12 02* 11 00*   HEMOGLOBIN g/dL 10 5* 10 3* 10 5* 10 4* 11 0*   HEMATOCRIT % 34 2* 33 2* 33 9* 33 8* 35 8   PLATELETS Thousands/uL 343 333 309 325 366   NEUTROS ABS Thousands/µL 7 41 8 01* 7 20 7 10 7 58       CMP:  Results from last 7 days   Lab Units 05/16/23  0925 05/15/23  0513 05/14/23  0606 05/13/23  0552 05/12/23  1223   POTASSIUM mmol/L 3 9 3 6 3 9 3 7 4 2   CHLORIDE mmol/L 112* 114* 115* 114* 111*   CO2 mmol/L 23 24 23 25 22   BUN mg/dL 18 18 14 14 13   CREATININE mg/dL 1 01 0 88 0 86 1 00 1 23   CALCIUM mg/dL 8 8 9 3 9 4 9 5 9 9   EGFR ml/min/1 73sq m 58 69 71 59 46       Sepsis:        Micro:  Lab Results   Component Value Date/Time    Blood Culture No Growth After 5 Days  04/11/2023 06:48 PM    Blood Culture No Growth After 5 Days  04/11/2023 06:40 PM    Urine Culture >100,000 cfu/ml Candida glabrata (A) 02/28/2023 08:10 PM    Urine Culture 30,000-39,000 cfu/ml 02/28/2023 08:10 PM    C difficile toxin by PCR Negative 03/06/2023 07:52 PM       Imaging:   No results found  Medications:     Current Facility-Administered Medications   Medication Dose Route Frequency   • amLODIPine (NORVASC) tablet 10 mg  10 mg Oral Daily   • atorvastatin (LIPITOR) tablet 40 mg  40 mg Oral Daily   • budesonide-formoterol (SYMBICORT) 160-4 5 mcg/act inhaler 1 puff  1 puff Inhalation BID   • cholecalciferol (VITAMIN D3) tablet 2,000 Units  2,000 Units Oral Daily   • docusate sodium (COLACE) capsule 100 mg  100 mg Oral BID   • gabapentin (NEURONTIN) capsule 100 mg  100 mg Oral BID   • heparin (porcine) subcutaneous injection 7,500 Units  7,500 Units Subcutaneous Q8H [facility]   • insulin glargine (LANTUS) subcutaneous injection 26 Units 0 26 mL  26 Units Subcutaneous HS   • losartan (COZAAR) tablet 100 mg  100 mg Oral Daily   • metFORMIN (GLUCOPHAGE) tablet 1,000 mg  1,000 mg Oral BID With Meals   • metoprolol tartrate (LOPRESSOR) tablet 50 mg  50 mg Oral Q12H RONI   • predniSONE tablet 4 mg  4 mg Oral Daily   • topiramate (TOPAMAX) tablet 100 mg  100 mg Oral HS       Physical Exam:   Physical Exam  Vitals reviewed  Constitutional:       General: She is not in acute distress  Appearance: Normal appearance  She is obese  HENT:      Head: Normocephalic and atraumatic  Eyes:      Conjunctiva/sclera: Conjunctivae normal    Cardiovascular:      Rate and Rhythm: Normal rate  Pulmonary:      Effort: Pulmonary effort is normal  No respiratory distress  "  Musculoskeletal:      Right lower leg: No edema  Left lower leg: No edema  Skin:     General: Skin is warm and dry  Neurological:      Mental Status: She is alert and oriented to person, place, and time  Gait: Gait normal    Psychiatric:         Mood and Affect: Mood normal          Behavior: Behavior normal            Adan Boyer MD  PGY-3, Family Medicine  05/18/23  8:05 AM    Dear reader, please be aware that portions of my note contain dictated text  I have done my best to proof-read this note prior to signing  However, there may be occasional unnoticed errors pertaining to \"sound-alike\" words and/or grammar during my dictation process  If there is any words or information that is unclear or appears erroneous, please kindly let me know and I will clarify and/or addend my notes accordingly  Thank you for your understanding        "

## 2023-05-18 NOTE — PLAN OF CARE
Problem: OCCUPATIONAL THERAPY ADULT  Goal: Performs self-care activities at highest level of function for planned discharge setting  See evaluation for individualized goals  Description: Treatment Interventions: ADL retraining, Functional transfer training, Endurance training, Patient/family training, Equipment evaluation/education, Compensatory technique education, Continued evaluation, Energy conservation, Activityengagement          See flowsheet documentation for full assessment, interventions and recommendations  Outcome: Progressing  Note: Limitation: Decreased ADL status, Decreased Safe judgement during ADL, Decreased endurance, Decreased high-level ADLs, Decreased self-care trans  Prognosis: Fair  Assessment: Pt seen for participation in Occupational Therapy session with focus on activity tolerance, bed mob, functional transfers/mob, sitting balance and tolerance and standing tolerance and balance for pt engagement in UB/LB self-care tasks and energy conservation techniques  Pt cleared by RN/Meeta for pt participated in OT session  Pt presented supine/HOB raised pt awake/alert and agreeable to participate in therapy following pt identifiers confirmed  Pt required set up assist for UB/LB self-care 2* limited activity tolerance  S He was able to tolerate standing at bathroom sink for grooming tasks after OT set up and instruction  Pt instructed on energy conservation techniques this session and pt able to verbalized good understanding  Pt will benefit from post acute rehab service to continue to address these above noted pt deficit which currently impair pt ADL and functional mob  Pt return to bedside chair post session, chair alarm activated and all needs within reach       OT Discharge Recommendation: Post acute rehabilitation services

## 2023-05-18 NOTE — ASSESSMENT & PLAN NOTE
Lab Results   Component Value Date    HGBA1C 11 2 (H) 04/19/2023       Recent Labs     05/17/23  1033 05/17/23  1621 05/17/23 2031 05/18/23  0631   POCGLU 178* 170* 214* 134       Blood Sugar Average: Last 72 hrs:  (P) 908 [id number]   Blood sugars within goal  Uncontrolled DM with diabetic retinopathy (legally blind), CKD stage III  Home regimen: Lantus 25 units twice daily, Humalog 5 units 3 times daily with meals  Has not been on her diabetes medications since discharge from recent rehab  · Lantus 26 units qhs    · Continue Metformin 1000mg BID, tolerating well
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
## 2023-05-18 NOTE — PHYSICAL THERAPY NOTE
Pertinent data include abnormal finding on CT of the thoracic spine,   Tree-in-bud groundglass opacities in the right lung, likely sequela of infectious/inflammatory bronchiolitis.  Worsening leukocytosis, generalized weakness  Started on IV Unasyn 1/23  Check procalcitonin   Physical Therapy Progress Note     05/18/23 1230   PT Last Visit   PT Visit Date 05/18/23   Note Type   Note Type Treatment   Pain Assessment   Pain Assessment Tool 0-10   Pain Score No Pain   Restrictions/Precautions   RLE Weight Bearing Per Order WBAT  (In surgical shoe )   LLE Weight Bearing Per Order WBAT  (In heel offloading shoe )   Other Precautions Fall Risk;Pain;WBS   Subjective   Subjective The patient expresses her continued frustration with her difficulties  Transfers   Sit to Stand 5  Supervision   Additional items Increased time required   Stand to Sit 5  Supervision   Additional items Increased time required   Ambulation/Elevation   Gait pattern Excessively slow; Step to;Short stride; Inconsistent ana;Decreased foot clearance   Gait Assistance 5  Supervision   Additional items Verbal cues   Assistive Device Rolling walker   Distance 60 feet, 40 feet  Balance   Static Sitting Good   Dynamic Sitting Fair +   Static Standing Fair   Dynamic Standing Fair -   Ambulatory Fair -   Activity Tolerance   Activity Tolerance Patient tolerated treatment well;Patient limited by fatigue   Assessment   Prognosis Good   Problem List Decreased strength; Impaired balance;Decreased range of motion;Decreased endurance;Decreased mobility;Pain   Assessment The patient is demonstrating improved activity tolerance, but she continues to have a very slow ana  She is able to stand for extended periods of time without overt signs of fatigue  She continues to express her challenges as well as her frustrations with them  Will continue to follow  Goals   Patient Goals To find a place to live with her son  STG Expiration Date 05/23/23   PT Treatment Day 2   Plan   Treatment/Interventions Functional transfer training;LE strengthening/ROM; Therapeutic exercise; Endurance training;Patient/family training;Bed mobility;Gait training   Progress Progressing toward goals   PT Frequency 2-3x/wk   Recommendation   PT Discharge Recommendation Post acute rehabilitation services   AM-PAC Basic Mobility Inpatient   Turning in Flat Bed Without Bedrails 3   Lying on Back to Sitting on Edge of Flat Bed Without Bedrails 3   Moving Bed to Chair 3   Standing Up From Chair Using Arms 3   Walk in Room 3   Climb 3-5 Stairs With Railing 1   Basic Mobility Inpatient Raw Score 16   Basic Mobility Standardized Score 38 32   Highest Level Of Mobility   -HLM Goal 5: Stand one or more mins   JH-HLM Achieved 7: Walk 25 feet or more         An AM-PAC Basic Mobility raw score less than 16 suggests the patient may benefit from discharge to post-acute rehab services      Vikash Russo PTA

## 2023-05-18 NOTE — PLAN OF CARE
Problem: MOBILITY - ADULT  Goal: Maintain or return to baseline ADL function  Description: INTERVENTIONS:  -  Assess patient's ability to carry out ADLs; assess patient's baseline for ADL function and identify physical deficits which impact ability to perform ADLs (bathing, care of mouth/teeth, toileting, grooming, dressing, etc )  - Assess/evaluate cause of self-care deficits   - Assess range of motion  - Assess patient's mobility; develop plan if impaired  - Assess patient's need for assistive devices and provide as appropriate  - Encourage maximum independence but intervene and supervise when necessary  - Involve family in performance of ADLs  - Assess for home care needs following discharge   - Consider OT consult to assist with ADL evaluation and planning for discharge  - Provide patient education as appropriate  Outcome: Progressing  Goal: Maintains/Returns to pre admission functional level  Description: INTERVENTIONS:  - Perform BMAT or MOVE assessment daily    - Set and communicate daily mobility goal to care team and patient/family/caregiver  - Collaborate with rehabilitation services on mobility goals if consulted  - Perform Range of Motion    times a day  - Reposition patient every      hours    - Dangle patient    times a day  - Stand patient    times a day  - Ambulate patient    times a day  - Out of bed to chair    times a day   - Out of bed for meals    times a day  - Out of bed for toileting  - Record patient progress and toleration of activity level   Outcome: Progressing     Problem: PAIN - ADULT  Goal: Verbalizes/displays adequate comfort level or baseline comfort level  Description: Interventions:  - Encourage patient to monitor pain and request assistance  - Assess pain using appropriate pain scale  - Administer analgesics based on type and severity of pain and evaluate response  - Implement non-pharmacological measures as appropriate and evaluate response  - Consider cultural and social influences on pain and pain management  - Notify physician/advanced practitioner if interventions unsuccessful or patient reports new pain  Outcome: Progressing     Problem: INFECTION - ADULT  Goal: Absence or prevention of progression during hospitalization  Description: INTERVENTIONS:  - Assess and monitor for signs and symptoms of infection  - Monitor lab/diagnostic results  - Monitor all insertion sites, i e  indwelling lines, tubes, and drains  - Monitor endotracheal if appropriate and nasal secretions for changes in amount and color  - Tamiment appropriate cooling/warming therapies per order  - Administer medications as ordered  - Instruct and encourage patient and family to use good hand hygiene technique  - Identify and instruct in appropriate isolation precautions for identified infection/condition  Outcome: Progressing  Goal: Absence of fever/infection during neutropenic period  Description: INTERVENTIONS:  - Monitor WBC    Outcome: Progressing     Problem: SAFETY ADULT  Goal: Maintain or return to baseline ADL function  Description: INTERVENTIONS:  -  Assess patient's ability to carry out ADLs; assess patient's baseline for ADL function and identify physical deficits which impact ability to perform ADLs (bathing, care of mouth/teeth, toileting, grooming, dressing, etc )  - Assess/evaluate cause of self-care deficits   - Assess range of motion  - Assess patient's mobility; develop plan if impaired  - Assess patient's need for assistive devices and provide as appropriate  - Encourage maximum independence but intervene and supervise when necessary  - Involve family in performance of ADLs  - Assess for home care needs following discharge   - Consider OT consult to assist with ADL evaluation and planning for discharge  - Provide patient education as appropriate  Outcome: Progressing  Goal: Maintains/Returns to pre admission functional level  Description: INTERVENTIONS:  - Perform BMAT or MOVE assessment daily    - Set and communicate daily mobility goal to care team and patient/family/caregiver  - Collaborate with rehabilitation services on mobility goals if consulted  - Perform Range of Motion    times a day  - Reposition patient every    hours    - Dangle patient    times a day  - Stand patient    times a day  - Ambulate patient    times a day  - Out of bed to chair    times a day   - Out of bed for meals    times a day  - Out of bed for toileting  - Record patient progress and toleration of activity level   Outcome: Progressing  Goal: Patient will remain free of falls  Description: INTERVENTIONS:  - Educate patient/family on patient safety including physical limitations  - Instruct patient to call for assistance with activity   - Consult OT/PT to assist with strengthening/mobility   - Keep Call bell within reach  - Keep bed low and locked with side rails adjusted as appropriate  - Keep care items and personal belongings within reach  - Initiate and maintain comfort rounds  - Make Fall Risk Sign visible to staff  - Offer Toileting every    Hours, in advance of need  - Initiate/Maintain   alarm  - Obtain necessary fall risk management equipment:   - Apply yellow socks and bracelet for high fall risk patients  - Consider moving patient to room near nurses station  Outcome: Progressing     Problem: DISCHARGE PLANNING  Goal: Discharge to home or other facility with appropriate resources  Description: INTERVENTIONS:  - Identify barriers to discharge w/patient and caregiver  - Arrange for needed discharge resources and transportation as appropriate  - Identify discharge learning needs (meds, wound care, etc )  - Arrange for interpretive services to assist at discharge as needed  - Refer to Case Management Department for coordinating discharge planning if the patient needs post-hospital services based on physician/advanced practitioner order or complex needs related to functional status, cognitive ability, or social support system  Outcome: Progressing     Problem: SKIN/TISSUE INTEGRITY - ADULT  Goal: Skin Integrity remains intact(Skin Breakdown Prevention)  Description: Assess:  -Perform Walker assessment every       -Clean and moisturize skin every     -Inspect skin when repositioning, toileting, and assisting with ADLS  -Assess under medical devices such as every  -Assess extremities for adequate circulation and sensation     Bed Management:  -Have minimal linens on bed & keep smooth, unwrinkled  -Change linens as needed when moist or perspiring  -Avoid sitting or lying in one position for more than    hours while in bed  -Keep HOB at     degrees     Toileting:  -Offer bedside commode  -Assess for incontinence every     -Use incontinent care products after each incontinent episode such as    Activity:  -Mobilize patient        times a day  -Encourage activity and walks on unit  -Encourage or provide ROM exercises   -Turn and reposition patient every    Hours  -Use appropriate equipment to lift or move patient in bed  -Instruct/ Assist with weight shifting every    when out of bed in chair  -Consider limitation of chair time      hour intervals    Skin Care:  -Avoid use of baby powder, tape, friction and shearing, hot water or constrictive clothing  -Relieve pressure over bony prominences using       -Do not massage red bony areas    Next Steps:  -Teach patient strategies to minimize risks such as      -Consider consults to  interdisciplinary teams such as     Outcome: Progressing  Goal: Incision(s), wounds(s) or drain site(s) healing without S/S of infection  Description: INTERVENTIONS  - Assess and document dressing, incision, wound bed, drain sites and surrounding tissue  - Provide patient and family education  - Perform skin care/dressing changes every       Outcome: Progressing  Goal: Pressure injury heals and does not worsen  Description: Interventions:  - Implement low air loss mattress or specialty surface (Criteria met)  - Apply silicone foam dressing  - Instruct/assist with weight shifting every    minutes when in chair   - Limit chair time to      hour intervals  - Use special pressure reducing interventions such as    when in chair   - Apply fecal or urinary incontinence containment device   - Perform passive or active ROM every       - Turn and reposition patient & offload bony prominences every    hours   - Utilize friction reducing device or surface for transfers   - Consider consults to  interdisciplinary teams such as     - Use incontinent care products after each incontinent episode such as       - Consider nutrition services referral as needed  Outcome: Progressing     Problem: MUSCULOSKELETAL - ADULT  Goal: Maintain or return mobility to safest level of function  Description: INTERVENTIONS:  - Assess patient's ability to carry out ADLs; assess patient's baseline for ADL function and identify physical deficits which impact ability to perform ADLs (bathing, care of mouth/teeth, toileting, grooming, dressing, etc )  - Assess/evaluate cause of self-care deficits   - Assess range of motion  - Assess patient's mobility  - Assess patient's need for assistive devices and provide as appropriate  - Encourage maximum independence but intervene and supervise when necessary  - Involve family in performance of ADLs  - Assess for home care needs following discharge   - Consider OT consult to assist with ADL evaluation and planning for discharge  - Provide patient education as appropriate  Outcome: Progressing  Goal: Maintain proper alignment of affected body part  Description: INTERVENTIONS:  - Support, maintain and protect limb and body alignment  - Provide patient/ family with appropriate education  Outcome: Progressing     Problem: Prexisting or High Potential for Compromised Skin Integrity  Goal: Skin integrity is maintained or improved  Description: INTERVENTIONS:  - Identify patients at risk for skin breakdown  - Assess and monitor skin integrity  - Assess and monitor nutrition and hydration status  - Monitor labs   - Assess for incontinence   - Turn and reposition patient  - Assist with mobility/ambulation  - Relieve pressure over bony prominences  - Avoid friction and shearing  - Provide appropriate hygiene as needed including keeping skin clean and dry  - Evaluate need for skin moisturizer/barrier cream  - Collaborate with interdisciplinary team   - Patient/family teaching  - Consider wound care consult   Outcome: Progressing

## 2023-05-18 NOTE — DISCHARGE SUMMARY
"Family Medicine Residency, Yohana Clark 1960, 57 y o  female  MRN: [id number]    Unit/Bed#: -01 Encounter: [id number]  Primary Care Provider: Vidya Julian MD      Admission Date: 5/8/2023 1114  Discharge Date:  5/18/23  Length of Stay: 9 days  Diagnosis: Homelessness [Z59 00]  Open wound of left heel, initial encounter [S91 302A]  Uncontrolled type 2 diabetes mellitus with hyperglycemia ([facility] [address] ) [E11 65]    Plans finalization pending attending physician attestation  HPI (per admission H&P note on 05/08)     HPI:   \"62 YOF presenting to the ED for open blisters of the left heel  PMH includes uncontrolled DM on insulin, diabetic retinopathy (legally blind), CKD, HTN, HLD, PMR, hx of seizures and migraines  Socially complex history - patient became homeless back in February and has been noted to have multiple ED visits/admissions to both hospital and rehab centers  Patient was recently discharged from her last rehab on Thursday and has been homeless since then  She has not been able to take her medications these past few days partly due to access to care/meds but also due to her blindness  She is not able to read her medication bottles and does not know which meds she is taking  Also has ambulatory dysfunction       ED management: Vitals and labs wnl  CM spoke with patient and unfortunately, she is a medical liability at the shelter and would not be accepted back  \"    Leukocytosis  Assessment & Plan  Chronic leukocytosis, likely related to daily prednisone 4 mg  VSS, does not appear infected  Friction blisters of the soles  Assessment & Plan  Stable on exam, does not appear infected  Also has history of uncontrolled DM and neuropathy  Local wound care  Podiatry placed wound care orders         Dyslipidemia  Assessment & Plan  Continue Lipitor 40mg daily      Homelessness  Assessment & Plan  Socially complex history, patient and her son became homeless back in February and the patient " has had multiple ED visits, admissions to both hospital and STRs  Last discharged from rehab this past week, has been homeless since  Affecting her access to care and medications  CM following for placement- follow-up for updates  Patient would maybe benefit from pill packs on discharge given her blindness     Diabetic neuropathy ([facility] [address] )  Assessment & Plan  Stable      Continue PTA Gabapentin 100mg BID     Stage 3a chronic kidney disease [facility])  Assessment & Plan  Lab Results   Component Value Date    EGFR 58 05/16/2023    EGFR 69 05/15/2023    EGFR 71 05/14/2023    CREATININE 1 01 05/16/2023    CREATININE 0 88 05/15/2023    CREATININE 0 86 05/14/2023     Stable renal function       Hypertension  Assessment & Plan  Last Blood Pressure: 948/19  Systolic (86QXH), ART:764 , Min:131 , JRV:521   Diastolic (09ROY), XEB:58, Min:67, Max:73    Home regimen: Amlodipine 5mg daily, Losartan 100mg daily, Lopressor 100mg q12hrs, Lasix 20mg BID, Hydralazine 10mg TID  Was not taking her BP meds prior to admission due to multiple factors - homelessness, difficulty reading small print on pill bottles  Continue Losartan 100mg daily   Continue amlodipine 10mg daily  Continue Lopressor 50mg q12hrs        PMR (polymyalgia rheumatica) ([facility])  Assessment & Plan  Home regimen: prednisone 4mg daily, continue  Will need to continue outpatient follow-up      Seizures ([facility] )  Assessment & Plan  Hx of seizures and migraines  Continue PTA Topamax 100mg qhs      Uncontrolled type 2 diabetes mellitus with hyperglycemia [facility])  Assessment & Plan  Lab Results   Component Value Date    HGBA1C 11 2 (H) 04/19/2023       Recent Labs     05/17/23  1033 05/17/23  1621 05/17/23  2031 05/18/23  0631   POCGLU 178* 170* 214* 134       Blood Sugar Average: Last 72 hrs:  (P) 327 [id number]   Blood sugars within goal  Uncontrolled DM with diabetic retinopathy (legally blind), CKD stage III  Home regimen: Lantus 25 units twice daily, Humalog 5 units 3 times daily with meals  Has not been on her diabetes medications since discharge from recent rehab  Lantus 26 units qhs    Continue Metformin 1000mg BID, tolerating well     * Ambulatory dysfunction  Assessment & Plan  Patient presented to the ED for open blisters of the feet bilaterally, which were draining  History of uncontrolled diabetes, neuropathy, homelessness which is affecting her care  Patient was recently discharged from rehab last week  On physical exam, blisters do not appear to be infected   Vitals and labs otherwise WNL    Plan:  PT/OT referral - recommend STR  Podiatry consulted - WBAT, wound care  Stable for DC per podiatry  CM referral  Fall precautions       Patient Active Problem List   Diagnosis   • Uncontrolled type 2 diabetes mellitus with hyperglycemia ([address] )   • Seizures ([facility] [address] )   • Exertional dyspnea   • Enlarged pulmonary artery (HCC)   • Aortic dilatation ([facility])   • Anemia   • Decreased pulses in feet   • Hyperlipidemia   • Microalbuminuria   • Obstructive sleep apnea (adult) (pediatric)   • Class 3 severe obesity with serious comorbidity and body mass index (BMI) of 50 0 to 59 9 in adult [facility])   • Neuropathy of hand, right   • Prolonged QT interval   • Visual impairment in both eyes   • Vitamin D deficiency   • Lung nodules   • Orthostatic hypotension   • Mild intermittent asthma without complication   • Type 2 diabetes mellitus with microalbuminuria, with long-term current use of insulin ([facility])   • Frequent headaches   • Other inflammatory and immune myopathies, not elsewhere classified   • Transaminitis   • Morbid obesity (HCC)   • Polyneuropathy associated with underlying disease (HCC)   • PMR (polymyalgia rheumatica) ([facility])   • Thrombocytosis   • Left cavernous carotid aneurysm   • Type 2 diabetes mellitus with hyperglycemia, with long-term current use of insulin (HCC)   • Aneurysm ([facility])   • Thyroid nodule   • History of absence seizures   • Hypersomnia   • Migraine without aura and without status migrainosus, not intractable   • Cerebral aneurysm without rupture   • Chronic migraine without aura without status migrainosus, not intractable   • Type 2 diabetes mellitus with other specified complication ([facility])   • Hypertension   • Depressed mood   • Blurry vision, bilateral   • Ulnar neuropathy of right upper extremity   • Polymyalgia rheumatica ([facility])   • Gait instability   • Stage 3a chronic kidney disease ([facility])   • Chronic migraine without aura, not intractable, without status migrainosus   • Obstructive sleep apnea   • Diabetic neuropathy ([facility])   • Unsteadiness on feet   • Abnormal urinalysis   • Weakness   • Intertrigo   • Leg numbness   • Homelessness   • Bilateral lower extremity edema   • Diarrhea   • Dyslipidemia   • Insulin long-term use ([facility])   • Type 2 diabetes mellitus with hyperglycemia ([facility])   • Ambulatory dysfunction   • Legally blind   • Suspected pressure injury of deep tissue   • Calculus of gallbladder without cholecystitis without obstruction   • Sepsis ([facility] [address] )   • Diabetic foot ulcers ([facility])   • Abnormal CT scan   • Friction blisters of the soles   • Leukocytosis       HOSPITAL COURSE:     Hospital Course:   75-year-old female with history of uncontrolled type 2 diabetes, diabetic retinopathy legally blind, CKD, history of seizures and migraines presented to the ED with open blisters of left heel  Patient with socially complex history currently homeless with noted multiple ED visits/admissions  Blisters were not infected on exam   PT OT was consulted who recommended short-term rehab  Podiatry was consulted who recommended local wound care  Patient was medically cleared for discharge  Case management was following for placement  Multiple options were discussed with the patient regarding placement including [facility] which the patient was currently not interested in going to    Other options for shelter including [facility], [facility] [place] did not have beds available and also patient needed to sign 211 which the patient declined  Information provided by case management regarding possible options to the patient before discharge  Patient to follow-up with PCP and endocrinology  COMPLICATIONS:     Complications: NONE    PROCEDURES:     Procedures Performed:   No orders of the defined types were placed in this encounter  SIGNIFICANT FINDINGS / ABNORMAL RESULTS:     Significant Findings/Abnormal Results with this admission:  none    VITALS ON DISCHARGE DATE:     Vitals:    05/17/23 1525 05/17/23 2048 05/17/23 2210 05/18/23 0732   BP: 142/73 143/72 142/71 131/67   Pulse:  77 77 72   Resp: 18      Temp: 98 2 °F (36 8 °C) 98 2 °F (36 8 °C) 98 1 °F (36 7 °C)    TempSrc:       SpO2:  97% 95% 95%     Temp:  [98 1 °F (36 7 °C)-98 2 °F (36 8 °C)] 98 1 °F (36 7 °C)  HR:  [72-77] 72  Resp:  [18] 18  BP: (131-143)/(67-73) 131/67  Weight (last 2 days)     None          Intake/Output Summary (Last 24 hours) at 5/18/2023 1314  Last data filed at 5/18/2023 0900  Gross per 24 hour   Intake 180 ml   Output --   Net 180 ml     Invasive Devices     None                 PHYSICAL EXAM ON DAY OF DISCHARGE:     Physical Exam  Vitals reviewed  Constitutional:       General: She is not in acute distress  Appearance: Normal appearance  HENT:      Head: Normocephalic and atraumatic  Eyes:      Conjunctiva/sclera: Conjunctivae normal    Cardiovascular:      Rate and Rhythm: Normal rate and regular rhythm  Pulses: Normal pulses  Heart sounds: Normal heart sounds  Pulmonary:      Effort: Pulmonary effort is normal  No respiratory distress  Breath sounds: Normal breath sounds  Musculoskeletal:      Right lower leg: No edema  Left lower leg: No edema  Skin:     General: Skin is warm and dry  Neurological:      Mental Status: She is alert and oriented to person, place, and time        Gait: Gait normal    Psychiatric:         Mood and Affect: Mood normal          Behavior: Behavior normal  CONDITION AT DISCHARGE:   On day of discharge patient is seen and evaluated at bedside  Patient is stable and without concern  Patient denies any pain or SOB  Patient able to tolerate PO food without N/V/D and had bowel movement and baseline urine output  Patient able to ambulate independently without assistance  Patient is aware of current health status and understand plan of treatment and outpatient follow-up  The patient understood and agreed with the plan  All pertinent lab results, imaging studies, procedures, and/or any incidental findings have been disclosed to the patient  All pertinent questions are answered to patient's satisfaction  On day of discharge, the patient was hemodynamically stable and appropriate for discharge home/self care  Condition at Discharge: stable     DISCHARGE MEDICATIONS:     Discharge Medications:  See after visit summary for reconciled discharge medications provided to patient and family  Medication changes made with this admission:  -please see AVS    FOLLOW-UP APPOINTMENTS / INSTRUCTION :     Important Physician Related Follow Up:   Endocrinology appointment July 26  PCP for TCM visit  Follow-up with wound care within 2 weeks of discharge     Appointment confirmed:  Future Appointments   Date Time Provider Estefany Morilloi   6/2/2023  1:00 PM Kavita Crawford MD [facility]       Discharge instructions/Information to patient and family:   See after visit summary (AVS) for information provided to patient and family  Provisions for Follow-Up Care:  See after visit summary for information related to follow-up care and any pertinent home health orders  DISPOSITION:     Disposition: self care    Discharge Statement   I spent 30  minutes discharging the patient  This time was spent on the day of discharge  I had direct contact with the patient on the day of discharge   Additional documentation is required if more than 30 minutes were spent on discharge       Planned Readmission: No    Saeid Lopez MD  PGY-1, Family Medicine  05/18/23  1:14 PM

## 2023-05-18 NOTE — ASSESSMENT & PLAN NOTE
Lab Results   Component Value Date    EGFR 58 05/16/2023    EGFR 69 05/15/2023    EGFR 71 05/14/2023    CREATININE 1 01 05/16/2023    CREATININE 0 88 05/15/2023    CREATININE 0 86 05/14/2023     Stable renal function

## 2023-05-18 NOTE — PLAN OF CARE
Problem: MOBILITY - ADULT  Goal: Maintain or return to baseline ADL function  Description: INTERVENTIONS:  -  Assess patient's ability to carry out ADLs; assess patient's baseline for ADL function and identify physical deficits which impact ability to perform ADLs (bathing, care of mouth/teeth, toileting, grooming, dressing, etc )  - Assess/evaluate cause of self-care deficits   - Assess range of motion  - Assess patient's mobility; develop plan if impaired  - Assess patient's need for assistive devices and provide as appropriate  - Encourage maximum independence but intervene and supervise when necessary  - Involve family in performance of ADLs  - Assess for home care needs following discharge   - Consider OT consult to assist with ADL evaluation and planning for discharge  - Provide patient education as appropriate  Outcome: Progressing  Goal: Maintains/Returns to pre admission functional level  Description: INTERVENTIONS:  - Perform BMAT or MOVE assessment daily    - Set and communicate daily mobility goal to care team and patient/family/caregiver  - Collaborate with rehabilitation services on mobility goals if consulted  - Perform Range of Motion 4 times a day  - Reposition patient every 2 hours    - Dangle patient 4 times a day  - Stand patient 3 times a day  - Ambulate patient 3 times a day  - Out of bed to chair 3 times a day   - Out of bed for meals 3 times a day  - Out of bed for toileting  - Record patient progress and toleration of activity level   Outcome: Progressing     Problem: PAIN - ADULT  Goal: Verbalizes/displays adequate comfort level or baseline comfort level  Description: Interventions:  - Encourage patient to monitor pain and request assistance  - Assess pain using appropriate pain scale  - Administer analgesics based on type and severity of pain and evaluate response  - Implement non-pharmacological measures as appropriate and evaluate response  - Consider cultural and social influences on pain and pain management  - Notify physician/advanced practitioner if interventions unsuccessful or patient reports new pain  Outcome: Progressing     Problem: INFECTION - ADULT  Goal: Absence or prevention of progression during hospitalization  Description: INTERVENTIONS:  - Assess and monitor for signs and symptoms of infection  - Monitor lab/diagnostic results  - Monitor all insertion sites, i e  indwelling lines, tubes, and drains  - Monitor endotracheal if appropriate and nasal secretions for changes in amount and color  - [place] appropriate cooling/warming therapies per order  - Administer medications as ordered  - Instruct and encourage patient and family to use good hand hygiene technique  - Identify and instruct in appropriate isolation precautions for identified infection/condition  Outcome: Progressing  Goal: Absence of fever/infection during neutropenic period  Description: INTERVENTIONS:  - Monitor WBC    Outcome: Progressing     Problem: SAFETY ADULT  Goal: Maintain or return to baseline ADL function  Description: INTERVENTIONS:  -  Assess patient's ability to carry out ADLs; assess patient's baseline for ADL function and identify physical deficits which impact ability to perform ADLs (bathing, care of mouth/teeth, toileting, grooming, dressing, etc )  - Assess/evaluate cause of self-care deficits   - Assess range of motion  - Assess patient's mobility; develop plan if impaired  - Assess patient's need for assistive devices and provide as appropriate  - Encourage maximum independence but intervene and supervise when necessary  - Involve family in performance of ADLs  - Assess for home care needs following discharge   - Consider OT consult to assist with ADL evaluation and planning for discharge  - Provide patient education as appropriate  Outcome: Progressing  Goal: Maintains/Returns to pre admission functional level  Description: INTERVENTIONS:  - Perform BMAT or MOVE assessment daily    - Set and communicate daily mobility goal to care team and patient/family/caregiver  - Collaborate with rehabilitation services on mobility goals if consulted  - Perform Range of Motion 4 times a day  - Reposition patient every 2 hours    - Dangle patient 4 times a day  - Stand patient 3 times a day  - Ambulate patient 3 times a day  - Out of bed to chair 3 times a day   - Out of bed for meals 3 times a day  - Out of bed for toileting  - Record patient progress and toleration of activity level   Outcome: Progressing  Goal: Patient will remain free of falls  Description: INTERVENTIONS:  - Educate patient/family on patient safety including physical limitations  - Instruct patient to call for assistance with activity   - Consult OT/PT to assist with strengthening/mobility   - Keep Call bell within reach  - Keep bed low and locked with side rails adjusted as appropriate  - Keep care items and personal belongings within reach  - Initiate and maintain comfort rounds  - Make Fall Risk Sign visible to staff  - Offer Toileting every 2 Hours, in advance of need  - Initiate/Maintain bed alarm  - Obtain necessary fall risk management equipment  - Apply yellow socks and bracelet for high fall risk patients  - Consider moving patient to room near nurses station  Outcome: Progressing     Problem: DISCHARGE PLANNING  Goal: Discharge to home or other facility with appropriate resources  Description: INTERVENTIONS:  - Identify barriers to discharge w/patient and caregiver  - Arrange for needed discharge resources and transportation as appropriate  - Identify discharge learning needs (meds, wound care, etc )  - Arrange for interpretive services to assist at discharge as needed  - Refer to Case Management Department for coordinating discharge planning if the patient needs post-hospital services based on physician/advanced practitioner order or complex needs related to functional status, cognitive ability, or social support system  Outcome: Progressing     Problem: SKIN/TISSUE INTEGRITY - ADULT  Goal: Skin Integrity remains intact(Skin Breakdown Prevention)  Description: Assess:  -Perform Walker assessment  -Clean and moisturize skin  -Inspect skin when repositioning, toileting, and assisting with ADLS  -Assess under medical devices  -Assess extremities for adequate circulation and sensation     Bed Management:  -Have minimal linens on bed & keep smooth, unwrinkled  -Change linens as needed when moist or perspiring  -Avoid sitting or lying in one position for more than 2 hours while in bed  -Keep HOB at 30 degrees     Toileting:  -Offer bedside commode  -Assess for incontinence  -Use incontinent care products after each incontinent episode    Activity:  -Mobilize patient 4 times a day  -Encourage activity and walks on unit  -Encourage or provide ROM exercises   -Turn and reposition patient every 2 Hours  -Use appropriate equipment to lift or move patient in bed    Skin Care:  -Avoid use of baby powder, tape, friction and shearing, hot water or constrictive clothing  -Relieve pressure over bony prominences  -Do not massage red bony areas    Next Steps:  -Teach patient strategies to minimize risks   -Consider consults to  interdisciplinary teams  Outcome: Progressing  Goal: Incision(s), wounds(s) or drain site(s) healing without S/S of infection  Description: INTERVENTIONS  - Assess and document dressing, incision, wound bed, drain sites and surrounding tissue  - Provide patient and family education  - Perform skin care/dressing changes  Outcome: Progressing  Goal: Pressure injury heals and does not worsen  Description: Interventions:  - Implement low air loss mattress or specialty surface (Criteria met)  - Apply silicone foam dressing  - Instruct/assist with weight shifting every 30 minutes when in chair   - Limit chair time to 2 hour intervals  - Use special pressure reducing interventions when in chair   - Apply fecal or urinary incontinence containment device   - Perform passive or active ROM  - Turn and reposition patient & offload bony prominences every 2 hours   - Utilize friction reducing device or surface for transfers   - Consider consults to  interdisciplinary teams  - Use incontinent care products after each incontinent episode  - Consider nutrition services referral as needed  Outcome: Progressing     Problem: MUSCULOSKELETAL - ADULT  Goal: Maintain or return mobility to safest level of function  Description: INTERVENTIONS:  - Assess patient's ability to carry out ADLs; assess patient's baseline for ADL function and identify physical deficits which impact ability to perform ADLs (bathing, care of mouth/teeth, toileting, grooming, dressing, etc )  - Assess/evaluate cause of self-care deficits   - Assess range of motion  - Assess patient's mobility  - Assess patient's need for assistive devices and provide as appropriate  - Encourage maximum independence but intervene and supervise when necessary  - Involve family in performance of ADLs  - Assess for home care needs following discharge   - Consider OT consult to assist with ADL evaluation and planning for discharge  - Provide patient education as appropriate  Outcome: Progressing  Goal: Maintain proper alignment of affected body part  Description: INTERVENTIONS:  - Support, maintain and protect limb and body alignment  - Provide patient/ family with appropriate education  Outcome: Progressing     Problem: Prexisting or High Potential for Compromised Skin Integrity  Goal: Skin integrity is maintained or improved  Description: INTERVENTIONS:  - Identify patients at risk for skin breakdown  - Assess and monitor skin integrity  - Assess and monitor nutrition and hydration status  - Monitor labs   - Assess for incontinence   - Turn and reposition patient  - Assist with mobility/ambulation  - Relieve pressure over bony prominences  - Avoid friction and shearing  - Provide appropriate hygiene as needed including keeping skin clean and dry  - Evaluate need for skin moisturizer/barrier cream  - Collaborate with interdisciplinary team   - Patient/family teaching  - Consider wound care consult   Outcome: Progressing

## 2023-05-18 NOTE — OCCUPATIONAL THERAPY NOTE
"  Occupational Therapy Treatment Note       05/18/23 1032   OT Last Visit   OT Visit Date 05/18/23   Note Type   Note Type Treatment   Pain Assessment   Pain Assessment Tool 0-10   Pain Location/Orientation Location: Generalized   Restrictions/Precautions   Weight Bearing Precautions Per Order Yes   RLE Weight Bearing Per Order WBAT  (In surgical shoe )   LLE Weight Bearing Per Order WBAT  (In heel offloading shoe )   Braces or Orthoses   (L heel offloading shoe, R surgical shoe)   Lifestyle   Autonomy I w/ ADLS, Mod I w/ transfers and functional mobility PTA   Reciprocal Relationships Pt is homeless   Service to Others Unemployed; 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   [address] Will continue to assess   ADL   Where Assessed Standing at [facility] Assistance 5  Supervision/Setup   Grooming Deficit Increased time to complete;Setup   Bed Mobility   Supine to Sit 5  Supervision   Additional items Assist x 1   Transfers   Sit to Stand 5  Supervision   Additional items Assist x 1   Stand to Sit 5  Supervision   Additional items Assist x 1   Functional Mobility   Functional Mobility 5  Supervision   Additional items Rolling walker   Subjective   Subjective pt stated \" I would like to walk\"   Cognition   Overall Cognitive Status [facility]   Arousal/Participation Alert; Cooperative   Attention Within functional limits   Orientation Level Oriented X4   Memory Decreased recall of precautions   Following Commands Follows one step commands without difficulty   Activity Tolerance   Activity Tolerance Patient tolerated treatment well   Assessment   Assessment Pt seen for participation in Occupational Therapy session with focus on activity tolerance, bed mob, functional transfers/mob, sitting balance and tolerance and standing tolerance and balance for pt engagement in UB/LB self-care tasks and energy conservation techniques  Pt cleared by BRISA/Meeta for pt participated in OT session   Pt presented supine/HOB raised pt " awake/alert and agreeable to participate in therapy following pt identifiers confirmed  Pt required set up assist for UB/LB self-care 2* limited activity tolerance  SHe was able to tolerate standing at bathroom sink for grooming tasks after OT set up and instruction  Pt instructed on energy conservation techniques this session and pt able to verbalized good understanding  Pt will benefit from post acute rehab service to continue to address these above noted pt deficit which currently impair pt ADL and functional mob  Pt return to bedside chair post session, chair alarm activated and all needs within reach     Plan   Treatment Interventions ADL retraining   Goal Expiration Date 05/23/23   OT Treatment Day 2   OT Frequency 2-3x/wk   Recommendation   OT Discharge Recommendation Post acute rehabilitation services   AM-PAC Daily Activity Inpatient   Lower Body Dressing 3   Bathing 3   Toileting 3   Upper Body Dressing 3   Grooming 4   Eating 4   Daily Activity Raw Score 20   Daily Activity Standardized Score (Calc for Raw Score >=11) 42 03   AM-PAC Applied Cognition Inpatient   Following a Speech/Presentation 4   Understanding Ordinary Conversation 4   Taking Medications 4   Remembering Where Things Are Placed or Put Away 4   Remembering List of 4-5 Errands 4   Taking Care of Complicated Tasks 3   Applied Cognition Raw Score 23   Applied Cognition Standardized Score 53 08   Barthel Index   Feeding 10   Bathing 0   Grooming Score 5   Dressing Score 10   Bladder Score 10   Bowels Score 10   Toilet Use Score 10   Transfers (Bed/Chair) Score 15   Mobility (Level Surface) Score 15   Stairs Score 0   Barthel Index Score 85     Major BARBER/TAYLOR

## 2023-05-19 ENCOUNTER — PATIENT OUTREACH (OUTPATIENT)
Dept: FAMILY MEDICINE CLINIC | Facility: CLINIC | Age: 63
End: 2023-05-19

## 2023-05-19 ENCOUNTER — TELEPHONE (OUTPATIENT)
Dept: FAMILY MEDICINE CLINIC | Facility: CLINIC | Age: 63
End: 2023-05-19

## 2023-05-19 DIAGNOSIS — Z78.9 NEED FOR FOLLOW-UP BY SOCIAL WORKER: ICD-10-CM

## 2023-05-19 DIAGNOSIS — Z71.89 COMPLEX CARE COORDINATION: Primary | ICD-10-CM

## 2023-05-19 NOTE — PROGRESS NOTES
MITZI ONEILL received referral requesting to assist pt as pt and her son are currently experiencing homelessness  MITZI ONEILL attempted to call pt, however, the call went to voicemail  MITZI ONEILL was unable to leave voicemail, however, was given the opportunity to send an SMS notification  MITZI ONEILL sent SMS notification  MITZI ONEILL will be available for any additional needs as requested

## 2023-05-19 NOTE — TELEPHONE ENCOUNTER
Please schedule TCM appointment  Verify how patient is feeling and if they are in need of anything before their visit  Please send appt date and patient questions/concerns to [facility] to complete TCM

## 2023-05-22 ENCOUNTER — TRANSITIONAL CARE MANAGEMENT (OUTPATIENT)
Dept: FAMILY MEDICINE CLINIC | Facility: CLINIC | Age: 63
End: 2023-05-22

## 2023-05-22 ENCOUNTER — PATIENT OUTREACH (OUTPATIENT)
Dept: FAMILY MEDICINE CLINIC | Facility: CLINIC | Age: 63
End: 2023-05-22

## 2023-05-22 ENCOUNTER — OFFICE VISIT (OUTPATIENT)
Dept: FAMILY MEDICINE CLINIC | Facility: CLINIC | Age: 63
End: 2023-05-22

## 2023-05-22 VITALS
OXYGEN SATURATION: 96 % | HEIGHT: 68 IN | DIASTOLIC BLOOD PRESSURE: 60 MMHG | WEIGHT: 293 LBS | HEART RATE: 93 BPM | TEMPERATURE: 98.7 F | SYSTOLIC BLOOD PRESSURE: 112 MMHG | BODY MASS INDEX: 44.41 KG/M2 | RESPIRATION RATE: 16 BRPM

## 2023-05-22 DIAGNOSIS — E11.65 TYPE 2 DIABETES MELLITUS WITH HYPERGLYCEMIA, WITH LONG-TERM CURRENT USE OF INSULIN (HCC): ICD-10-CM

## 2023-05-22 DIAGNOSIS — Z59.02 UNSHELTERED HOMELESSNESS: ICD-10-CM

## 2023-05-22 DIAGNOSIS — Z79.4 TYPE 2 DIABETES MELLITUS WITH HYPERGLYCEMIA, WITH LONG-TERM CURRENT USE OF INSULIN (HCC): ICD-10-CM

## 2023-05-22 DIAGNOSIS — E78.49 OTHER HYPERLIPIDEMIA: ICD-10-CM

## 2023-05-22 DIAGNOSIS — S90.822D: ICD-10-CM

## 2023-05-22 DIAGNOSIS — Z59.02 UNSHELTERED HOMELESSNESS: Primary | ICD-10-CM

## 2023-05-22 DIAGNOSIS — R26.2 AMBULATORY DYSFUNCTION: ICD-10-CM

## 2023-05-22 DIAGNOSIS — R56.9 SEIZURES (HCC): ICD-10-CM

## 2023-05-22 DIAGNOSIS — D50.8 IRON DEFICIENCY ANEMIA SECONDARY TO INADEQUATE DIETARY IRON INTAKE: ICD-10-CM

## 2023-05-22 DIAGNOSIS — Z76.89 ENCOUNTER FOR SUPPORT AND COORDINATION OF TRANSITION OF CARE: Primary | ICD-10-CM

## 2023-05-22 DIAGNOSIS — E11.621 DIABETIC ULCER OF LEFT HEEL ASSOCIATED WITH TYPE 2 DIABETES MELLITUS, LIMITED TO BREAKDOWN OF SKIN (HCC): ICD-10-CM

## 2023-05-22 DIAGNOSIS — J45.21 MILD INTERMITTENT ASTHMA WITH EXACERBATION: ICD-10-CM

## 2023-05-22 DIAGNOSIS — L97.421 DIABETIC ULCER OF LEFT HEEL ASSOCIATED WITH TYPE 2 DIABETES MELLITUS, LIMITED TO BREAKDOWN OF SKIN (HCC): ICD-10-CM

## 2023-05-22 DIAGNOSIS — Z59.41 FOOD INSECURITY: ICD-10-CM

## 2023-05-22 DIAGNOSIS — M35.3 POLYMYALGIA RHEUMATICA (HCC): ICD-10-CM

## 2023-05-22 DIAGNOSIS — R60.0 BILATERAL LOWER EXTREMITY EDEMA: ICD-10-CM

## 2023-05-22 DIAGNOSIS — I10 PRIMARY HYPERTENSION: ICD-10-CM

## 2023-05-22 PROBLEM — G43.009 MIGRAINE WITHOUT AURA AND WITHOUT STATUS MIGRAINOSUS, NOT INTRACTABLE: Status: RESOLVED | Noted: 2020-04-06 | Resolved: 2023-05-22

## 2023-05-22 RX ORDER — LOSARTAN POTASSIUM 100 MG/1
100 TABLET ORAL DAILY
Qty: 30 TABLET | Refills: 2 | Status: ON HOLD | OUTPATIENT
Start: 2023-05-22 | End: 2023-05-30 | Stop reason: SDUPTHER

## 2023-05-22 RX ORDER — MELATONIN
2000 DAILY
Qty: 60 TABLET | Refills: 2 | Status: SHIPPED | OUTPATIENT
Start: 2023-05-22

## 2023-05-22 RX ORDER — INSULIN LISPRO 100 [IU]/ML
5 INJECTION, SOLUTION INTRAVENOUS; SUBCUTANEOUS
Qty: 3 ML | Refills: 4 | Status: SHIPPED | OUTPATIENT
Start: 2023-05-22 | End: 2023-05-30

## 2023-05-22 RX ORDER — BLOOD SUGAR DIAGNOSTIC
STRIP MISCELLANEOUS
Qty: 200 EACH | Refills: 3 | Status: SHIPPED | OUTPATIENT
Start: 2023-05-22

## 2023-05-22 RX ORDER — BUDESONIDE AND FORMOTEROL FUMARATE DIHYDRATE 160; 4.5 UG/1; UG/1
1 AEROSOL RESPIRATORY (INHALATION) 2 TIMES DAILY
Qty: 10.2 G | Refills: 2 | Status: ON HOLD | OUTPATIENT
Start: 2023-05-22 | End: 2023-05-30 | Stop reason: SDUPTHER

## 2023-05-22 RX ORDER — LANCETS 33 GAUGE
1 EACH MISCELLANEOUS 4 TIMES DAILY
Qty: 200 EACH | Refills: 2 | Status: SHIPPED | OUTPATIENT
Start: 2023-05-22

## 2023-05-22 RX ORDER — ASPIRIN 81 MG/1
81 TABLET ORAL DAILY
Qty: 30 TABLET | Refills: 2 | Status: ON HOLD | OUTPATIENT
Start: 2023-05-22 | End: 2023-05-30 | Stop reason: SDUPTHER

## 2023-05-22 RX ORDER — TOPIRAMATE 100 MG/1
100 TABLET, FILM COATED ORAL
Qty: 30 TABLET | Refills: 2 | Status: ON HOLD | OUTPATIENT
Start: 2023-05-22 | End: 2023-05-30 | Stop reason: SDUPTHER

## 2023-05-22 RX ORDER — AMLODIPINE BESYLATE 5 MG/1
5 TABLET ORAL DAILY
Qty: 30 TABLET | Refills: 2 | Status: ON HOLD | OUTPATIENT
Start: 2023-05-22 | End: 2023-05-30 | Stop reason: SDUPTHER

## 2023-05-22 RX ORDER — METOPROLOL TARTRATE 100 MG/1
100 TABLET ORAL EVERY 12 HOURS SCHEDULED
Qty: 60 TABLET | Refills: 2 | Status: ON HOLD | OUTPATIENT
Start: 2023-05-22 | End: 2023-05-30 | Stop reason: SDUPTHER

## 2023-05-22 RX ORDER — FUROSEMIDE 20 MG/1
20 TABLET ORAL 2 TIMES DAILY
Qty: 60 TABLET | Refills: 2 | Status: ON HOLD | OUTPATIENT
Start: 2023-05-22 | End: 2023-05-30 | Stop reason: SDUPTHER

## 2023-05-22 RX ORDER — PREDNISONE 1 MG/1
4 TABLET ORAL DAILY
Qty: 120 TABLET | Refills: 1 | Status: ON HOLD | OUTPATIENT
Start: 2023-05-22 | End: 2023-05-30 | Stop reason: SDUPTHER

## 2023-05-22 RX ORDER — GABAPENTIN 100 MG/1
100 CAPSULE ORAL 2 TIMES DAILY
Qty: 60 CAPSULE | Refills: 2 | Status: ON HOLD | OUTPATIENT
Start: 2023-05-22 | End: 2023-05-30 | Stop reason: SDUPTHER

## 2023-05-22 RX ORDER — INSULIN GLARGINE 100 [IU]/ML
INJECTION, SOLUTION SUBCUTANEOUS
Qty: 15 ML | Refills: 2 | Status: SHIPPED | OUTPATIENT
Start: 2023-05-22 | End: 2023-05-30

## 2023-05-22 RX ORDER — PEN NEEDLE, DIABETIC 32GX 5/32"
NEEDLE, DISPOSABLE MISCELLANEOUS
Qty: 200 EACH | Refills: 3 | Status: SHIPPED | OUTPATIENT
Start: 2023-05-22

## 2023-05-22 RX ORDER — ATORVASTATIN CALCIUM 40 MG/1
40 TABLET, FILM COATED ORAL DAILY
Qty: 30 TABLET | Refills: 2 | Status: ON HOLD | OUTPATIENT
Start: 2023-05-22 | End: 2023-05-30 | Stop reason: SDUPTHER

## 2023-05-22 RX ORDER — NICOTINE POLACRILEX 2 MG
1 LOZENGE BUCCAL DAILY
Qty: 30 TABLET | Refills: 2 | Status: SHIPPED | OUTPATIENT
Start: 2023-05-22

## 2023-05-22 RX ORDER — HYDRALAZINE HYDROCHLORIDE 10 MG/1
10 TABLET, FILM COATED ORAL 3 TIMES DAILY
Qty: 90 TABLET | Refills: 2 | Status: ON HOLD | OUTPATIENT
Start: 2023-05-22 | End: 2023-05-30 | Stop reason: SDUPTHER

## 2023-05-22 RX ORDER — BLOOD-GLUCOSE METER
1 KIT MISCELLANEOUS 4 TIMES DAILY
Qty: 1 KIT | Refills: 0 | Status: SHIPPED | OUTPATIENT
Start: 2023-05-22

## 2023-05-22 SDOH — ECONOMIC STABILITY - HOUSING INSECURITY: UNSHELTERED HOMELESSNESS: Z59.02

## 2023-05-22 SDOH — ECONOMIC STABILITY - FOOD INSECURITY: FOOD INSECURITY: Z59.41

## 2023-05-22 NOTE — PROGRESS NOTES
MITZI ONEILL completed chart review and was made aware that pt had an appointment today  MITZI ONEILL sent TT to provider requesting that MITZI ONEILL meet with pt after appointment  MITZI CM met pt and her son after her appointment with PCP  Pt expressed that she has been homeless since February due to increase in rent and she was no longer able to afford it as she has been unable to work to supplement her SSD  Pt stated that she lived in her apartment with her son  MITZI ONEILL inquired if her son had any income and was made aware that pt's son did not have any form of income and the family relied on her SSD  MITZI ONEILL inquired as to how much pt gets from [facility] and she stated that she gets $1292  MITZI ONEILL inquired if they got SNAP and she stated that they did not due to her not having a home she was not able to get the reverification forms so she lost her SNAP benefits  MITZI ONEILL inquired about food access and pt stated that she does not have food  Pt stated that there last meal was 24 hrs ago  MITZI ONEILL inquired about transport and pt stated that she takes the bus  Pt states that she pays out of pocket  She stated that some of the drivers know her and will allow her to travel for free  She states that she sometimes uses Uber/Lyft is needed  MITZI ONEILL inquired about mental health and pt denied services and stated that she has never needed the services before  MITZI ONEILL inquired if pt has ever has applied for housing and she stated that she did in the past but she has not heard anything  Pt stated that she is not able to navigate stairs at this time  Pt stated that prior to her loosing her apartment she was already having diffculties navigating the stairs  Pt stated she had also had a fall there as well  MITZI ONEILL discussed community programs with pt and her son such as drop in centers which will provide hot meals for pt and her son, showers and laundry services   MITZI ONEILL also discussed housing programs such as conference of churches that would be able to assist providing assistance with down payment  MITZI ONEILL reviewed role of [address] [place] [address] with offering assistance for [employer] and [employer]  Pt expressed understanding  MITZI ONEILL also discussed getting a mail box to receive mail  MITZI ONEILL called [facility] [employer] for assistance and was made aware that they did not have funding for emergency housing and would not be able to offer assistance  MITZI ONEILL was provided with list of housing landlords  And senior application  MITZI ONEILL will continue to be available for any additional needs as requested

## 2023-05-22 NOTE — ASSESSMENT & PLAN NOTE
Pt has no ability to attend to wound care [place]  Wound with serous drainage  Does not look infected at this time  - Wound cleaned with saline and redressed with nonadherent pad plus sterile 4x4 and gauze wrap (no Dermagran or other supplies available in office)  Provided pt with saline and dressing supplies    - Referral to Wound Care

## 2023-05-22 NOTE — ASSESSMENT & PLAN NOTE
Pt with bilateral dependent edema  Pt unable to lay down or elevate legs to sleep  - Ambulate as much as possible  Attempt to elevate legs whenever possible  Attempt to avoid salt in diet  - Continue Lasix 20 mg BID

## 2023-05-22 NOTE — ASSESSMENT & PLAN NOTE
Lab Results   Component Value Date    HGBA1C 11 2 (H) 04/19/2023     Pt has Lantus and glucometer but no supplies or metformin since hospital discharge  - Orders for all medications and supplies sent to [facility]  as requested by pt, though medications cannot be bubble packed at that location and is far from where pt is staying

## 2023-05-22 NOTE — PROGRESS NOTES
TCM Call     Date and time call was made  2023  3:00 PM    Hospital care reviewed  Records reviewed    Patient was hospitialized at  [facility]    Date of Admission  23    Date of discharge  23    Diagnosis  ambulatory dysfucntion    Disposition  Home    Were the patients medications reviewed and updated  No    Current Symptoms  None      TCM Call     Post hospital issues  None    Should patient be enrolled in anticoag monitoring? No    Scheduled for follow up? Yes    Not clinically warranted  NO LONGER A PT  CHANGED PCP TO [facility]  DR Denzel Armando    Did you obtain your prescribed medications  Yes    Do you need help managing your prescriptions or medications  No    Is transportation to your appointment needed  No    I have advised the patient to call PCP with any new or worsening symptoms  AYAN FORTUNE RMMUSA    Living Arrangements  Family members    Are you recieving any outpatient services  No    Are you recieving home care services  No    Are you using any community resources  No    Current waiver services  No    Have you fallen in the last 12 months  No    Interperter language line needed  No    Counseling  Patient          Name: Santosh Marquez      : 1960      MRN: [id number]  Encounter Provider: CONCETTA Gutiérrez  Encounter Date: 2023   Encounter department: [address] [place] [address]     1  Encounter for support and coordination of transition of care  Assessment & Plan:  - Reviewed hospital follow up appts, specialist referrals, and current medications with pt  Pt stated understanding of all   - Message sent to referral team for assistance scheduling Rheumatology and Wound Care        2  Type 2 diabetes mellitus with hyperglycemia, with long-term current use of insulin (HCC)  Assessment & Plan:    Lab Results   Component Value Date    HGBA1C 11 2 (H) 2023     Pt has Lantus and glucometer but no supplies or metformin since hospital discharge  - Orders for all medications and supplies sent to [facility]    as requested by pt, though medications cannot be bubble packed at that location and is far from where pt is staying  Orders:  -     Ambulatory Referral to Wound Care; Future  -     Blood Glucose Monitoring Suppl (OneTouch Verio Reflect) w/Device KIT; Use 1 each 4 (four) times a day  -     metFORMIN (GLUCOPHAGE) 1000 MG tablet; Take 1 tablet (1,000 mg total) by mouth 2 (two) times a day with meals  -     glucose blood (OneTouch Verio) test strip; Check blood sugars four times daily  Please substitute with appropriate alternative as covered by patient's insurance  Dx: E11 65  -     Insulin Glargine Solostar 100 UNIT/ML SOPN; Inject 0 27 mL (27 Units total) under the skin in the morning AND 0 24 mL (24 Units total) daily at bedtime  = Lantus 27 units at 9AM/ Lantus 24 units @ 9PM)  -     insulin lispro (HumaLOG) 100 units/mL injection pen; Inject 5 Units under the skin 3 (three) times a day with meals = also have SSI for  and higher  -     Lancets (OneTouch Delica Plus [id number]) MISC; Use 1 each 4 (four) times a day  -     Insulin Pen Needle (BD Pen Needle Tita U/F) 32G X 4 MM MISC; Use four times daily as directed with insulin pen    3  Diabetic ulcer of left heel associated with type 2 diabetes mellitus, limited to breakdown of skin ([address] )  -     Ambulatory Referral to Wound Care; Future    4  Friction blisters of sole of left foot, subsequent encounter  Assessment & Plan:  Pt has no ability to attend to wound care as [place] [facility] [place] [facility]  Wound with serous drainage  Does not look infected at this time  - Wound cleaned with saline and redressed with nonadherent pad plus sterile 4x4 and gauze wrap (no Dermagran or other supplies available in office)  Provided pt with saline and dressing supplies    - Referral to Wound Care  Orders:  -     Ambulatory Referral to Wound Care; Future    5  Primary hypertension  Assessment & Plan:  BP at goal in office today: 112/60   - Continue amlodipine 5 mg daily, hydralazine 10 mg TID, losartan 100 mg daily, metoprolol 100 mg BID    - Continue low-salt diet and daily physical activity  Orders:  -     amLODIPine (NORVASC) 5 mg tablet; Take 1 tablet (5 mg total) by mouth daily  -     aspirin (ECOTRIN LOW STRENGTH) 81 mg EC tablet; Take 1 tablet (81 mg total) by mouth daily  -     furosemide (LASIX) 20 mg tablet; Take 1 tablet (20 mg total) by mouth 2 (two) times a day  -     hydrALAZINE (APRESOLINE) 10 mg tablet; Take 1 tablet (10 mg total) by mouth 3 (three) times a day  -     losartan (COZAAR) 100 MG tablet; Take 1 tablet (100 mg total) by mouth daily  -     metoprolol tartrate (LOPRESSOR) 100 mg tablet; Take 1 tablet (100 mg total) by mouth every 12 (twelve) hours    6  Bilateral lower extremity edema  Assessment & Plan:  Pt with bilateral dependent edema  Pt unable to lay down or elevate legs to sleep  - Ambulate as much as possible  Attempt to elevate legs whenever possible  Attempt to avoid salt in diet  - Continue Lasix 20 mg BID  7  Polymyalgia rheumatica ([facility] [address] )  Assessment & Plan:  Pt states previous providers have attempted to wean off prednisone too quickly  Has been on 4 mg daily since 2018  - Referral to Rheumatology  Orders:  -     Ambulatory Referral to Rheumatology; Future  -     cholecalciferol (VITAMIN D3) 1,000 units tablet; Take 2 tablets (2,000 Units total) by mouth daily  -     predniSONE 1 mg tablet; Take 4 tablets (4 mg total) by mouth daily    8  Mild intermittent asthma with exacerbation  -     budesonide-formoterol (Symbicort) 160-4 5 mcg/act inhaler; Inhale 1 puff 2 (two) times a day Rinse mouth after use  9  Other hyperlipidemia  -     atorvastatin (LIPITOR) 40 mg tablet; Take 1 tablet (40 mg total) by mouth daily    10   Ambulatory dysfunction  Assessment & Plan:  - Continue utilizing wheelchair, walker, and left offloading shoe  - Referral to Wound Care  11  Seizures (HCC)  -     gabapentin (NEURONTIN) 100 mg capsule; Take 1 capsule (100 mg total) by mouth 2 (two) times a day  -     topiramate (TOPAMAX) 100 mg tablet; Take 1 tablet (100 mg total) by mouth daily at bedtime    12  Iron deficiency anemia secondary to inadequate dietary iron intake  -     multivitamin-iron-minerals-folic acid (THERAPEUTIC-M) TABS tablet; Take 1 tablet by mouth daily    13  Unsheltered homelessness  Assessment & Plan:  Pt and son have been sleeping on bench outside near hospital on [address] or staying in the casino in [place] overnight  They are carrying all of their belongings with them  -  met with pt while in office to discuss resources  Orders:  -     Ambulatory Referral to Social Work Care Management Program; Future    14  Food insecurity  Assessment & Plan:  Pt states she and son have not eaten since hospital discharge  - Advised pt to go to [facility] for meal after appt  -  met with pt while in office to discuss resources  Orders:  -     Ambulatory Referral to Social Work Care Management Program; Future         Subjective     HPI     Pat Aguirre presents to the office for a TCM visit accompanied by her son Anne Herron after hospitalization from 5/8 to 5/18/2023 for ambulatory dysfunction  Pt is medically complex and requires additional follow up  Attended to most pressing needs today, which include unsheltered homelessness, lack of food, lack of several medications, and wound on heel of left foot  Pt and son have been alternately staying at the [facility] in [place] or on a bench outdoors near the hospital on [address] in [facility]  They are carrying all of their belongings with them  They have been trying to alternate staying up for 24 hour stretches to keep a look out so that the other can sleep   Pt was not able to obtain the medications refilled at last hospital discharge as they were sent to a pharmacy "that pt cannot get to  Pt also has very poor vision and has not taken her insulin for the past two days because she could not read the name on the pen, did not know if it was short or long-acting insulin  She states they are \"out of cash\" and have not eaten  SNAP benefits were lost due to non-renewal  Pt has not looked unwrapped the wound on her foot due to lack of a safe/clean space to do so  Review of Systems   Constitutional: Positive for fatigue  Negative for fever and unexpected weight change  Eyes: Positive for visual disturbance (chronic poor vision)  Respiratory: Negative  Cardiovascular: Positive for leg swelling  Negative for chest pain and palpitations  Gastrointestinal: Negative  Neurological: Negative  Psychiatric/Behavioral: Positive for sleep disturbance  All other systems reviewed and are negative  Past Medical History:   Diagnosis Date   • Anemia    • Arthritis    • Benign hypertension     Procedure/Onset: 01/01/2005; Description: BP elevated today  Pt reports running out of BP meds 2wks ago  Will resume BP meds     • Diabetes mellitus ([facility] [address] )    • Diabetes mellitus type 2 with complications, uncontrolled 9/8/2015   • Dyspnea on exertion    • Hyperlipidemia    • Hypertension    • Legally blind 2010   • Mild intermittent asthma with exacerbation 8/4/2018   • Miscarriage     x 3   • Polymyalgia rheumatica ([facility] [address] ) 11/24/2021   • Seizures ([facility] [address] )    • Sickle cell trait ([facility] [address] )    • Sleep apnea    • Stage 3a chronic kidney disease ([facility] [address] ) 5/19/2022   • Thyroid nodule 3/6/2020     Past Surgical History:   Procedure Laterality Date   • CATARACT EXTRACTION Bilateral     august and september   • EYE SURGERY     • HYSTERECTOMY      37   • OOPHORECTOMY Left     37   • OR LIGATION/BIOPSY TEMPORAL ARTERY Bilateral 10/17/2019    Procedure: BIOPSY ARTERY TEMPORAL;  Surgeon: Christopher Alvarez DO;  Location: [facility];  Service: Vascular   • US GUIDED THYROID BIOPSY  5/13/2020     Family History " Problem Relation Age of Onset   • Heart attack Mother    • Heart disease Mother    • Hypertension Mother    • No Known Problems Father    • Heart disease Sister    • No Known Problems Brother    • No Known Problems Son    • No Known Problems Daughter    • Heart attack Maternal Grandmother    • Heart disease Maternal Grandmother    • Diabetes Other    • Heart attack Other    • No Known Problems Sister    • No Known Problems Sister    • No Known Problems Paternal Aunt    • No Known Problems Son    • Cancer Neg Hx    • Stroke Neg Hx      Social History     Socioeconomic History   • Marital status: /Civil Union     Spouse name: None   • Number of children: None   • Years of education: None   • Highest education level: None   Occupational History   • Occupation: long term disability   Tobacco Use   • Smoking status: Never   • Smokeless tobacco: Never   Vaping Use   • Vaping Use: Never used   Substance and Sexual Activity   • Alcohol use: Never     Alcohol/week: 0 0 standard drinks   • Drug use: No   • Sexual activity: Not Currently     Partners: Male     Birth control/protection: None   Other Topics Concern   • None   Social History Narrative    Caffeine use    Resides with spouse and one son     Social Determinants of Health     Financial Resource Strain: Not on file   Food Insecurity: Food Insecurity Present   • Worried About Running Out of Food in the Last Year: Often true   • Ran Out of Food in the Last Year: Often true   Transportation Needs: Unmet Transportation Needs   • Lack of Transportation (Medical):  Yes   • Lack of Transportation (Non-Medical): Yes   Physical Activity: Not on file   Stress: Not on file   Social Connections: Not on file   Intimate Partner Violence: Not on file   Housing Stability: High Risk   • Unable to Pay for Housing in the Last Year: Yes   • Number of Places Lived in the Last Year: 3   • Unstable Housing in the Last Year: Yes     Current Outpatient Medications on File Prior to Visit   Medication Sig   • acetaminophen (TYLENOL) 325 mg tablet Take 650 mg by mouth every 6 (six) hours as needed for mild pain   • Blood Pressure Monitor KIT Check blood pressures BID   • Continuous Blood Gluc  (FreeStyle Batres 2 [place]) ERIC Use 1 each 4 (four) times a day   • Continuous Blood Gluc Sensor (FreeStyle Denisa 2 Sensor) MISC Use 1 each every 14 (fourteen) days   • docusate sodium (COLACE) 100 mg capsule Take 1 capsule (100 mg total) by mouth 2 (two) times a day   • Emollient (eucerin) lotion Apply 1 application  topically 2 (two) times a day = to B/L LE   • insulin lispro (HumaLOG) 100 units/mL injection Inject under the skin Sliding scale  Inject as per sliding scale:  if 0 - 150 = 0 unit;  151 - 200 = 2 units;  201 - 250 = 4 units;  251 - 300 = 6 units;  301 - 350 = 8 units;  351 - 400 = 10 units less than 70 or above 400 notify MD,  subcutaneously before meals and at bedtime for DM   • loperamide (IMODIUM) 2 mg capsule Take 1 capsule (2 mg total) by mouth 4 (four) times a day as needed for diarrhea (can have up to 8mg per day)   • Nutritional Supplements (ProSource No Carb) LIQD Take 30 mL by mouth 2 (two) times a day   • Petrolatum-Zinc Oxide 57-17 % PSTE Apply 1 application  topically 3 (three) times a day To sacral area - protective   • [DISCONTINUED] amLODIPine (NORVASC) 5 mg tablet Take 1 tablet (5 mg total) by mouth daily Do not start before April 19, 2023  • [DISCONTINUED] aspirin (ECOTRIN LOW STRENGTH) 81 mg EC tablet Take 1 tablet (81 mg total) by mouth daily   • [DISCONTINUED] atorvastatin (LIPITOR) 40 mg tablet Take 1 tablet (40 mg total) by mouth daily   • [DISCONTINUED] Blood Glucose Monitoring Suppl (OneTouch Verio) w/Device KIT Use 3 (three) times a day for 14 days   • [DISCONTINUED] budesonide-formoterol (Symbicort) 160-4 5 mcg/act inhaler Inhale 1 puff 2 (two) times a day Rinse mouth after use     • [DISCONTINUED] cholecalciferol (VITAMIN D3) 1,000 units tablet Take 2 tablets (2,000 Units total) by mouth daily   • [DISCONTINUED] furosemide (LASIX) 20 mg tablet Take 1 tablet (20 mg total) by mouth 2 (two) times a day   • [DISCONTINUED] gabapentin (NEURONTIN) 100 mg capsule Take 1 capsule (100 mg total) by mouth 2 (two) times a day   • [DISCONTINUED] hydrALAZINE (APRESOLINE) 10 mg tablet Take 1 tablet (10 mg total) by mouth 3 (three) times a day   • [DISCONTINUED] Insulin Glargine Solostar 100 UNIT/ML SOPN Inject 0 27 mL (27 Units total) under the skin in the morning AND 0 24 mL (24 Units total) daily at bedtime  = Lantus 27 units at 9AM/ Lantus 24 units @ 9PM)     • [DISCONTINUED] insulin lispro (HumaLOG) 100 units/mL injection pen Inject 5 Units under the skin 3 (three) times a day with meals = also have SSI for  and higher   • [DISCONTINUED] Lancets (OneTouch Delica Plus [id number]) MISC Use 1 each 3 (three) times a day   • [DISCONTINUED] losartan (COZAAR) 100 MG tablet Take 1 tablet (100 mg total) by mouth daily   • [DISCONTINUED] metFORMIN (GLUCOPHAGE) 1000 MG tablet Take 1 tablet (1,000 mg total) by mouth 2 (two) times a day with meals   • [DISCONTINUED] metoprolol tartrate (LOPRESSOR) 100 mg tablet Take 1 tablet (100 mg total) by mouth every 12 (twelve) hours   • [DISCONTINUED] multivitamin-iron-minerals-folic acid (THERAPEUTIC-M) TABS tablet Take 1 tablet by mouth daily   • [DISCONTINUED] predniSONE 1 mg tablet Take 4 tablets (4 mg total) by mouth daily   • [DISCONTINUED] topiramate (TOPAMAX) 100 mg tablet Take 1 tablet (100 mg total) by mouth daily at bedtime     No Known Allergies  Immunization History   Administered Date(s) Administered   • COVID-19 MODERNA VACC 0 5 ML IM 03/23/2021, 04/22/2021   • COVID-19 Pfizer Vac BIVALENT Omer-sucrose 12 Yr+ IM (BOOSTER ONLY) 05/11/2023   • INFLUENZA 12/14/2014, 02/08/2018   • Influenza Quadrivalent 3 years and older 09/01/2020   • Influenza Quadrivalent Preservative Free 3 years and older IM 02/08/2018   • Influenza, "recombinant, quadrivalent,injectable, preservative free 10/12/2018, 2019, 10/06/2020, 2021   • Pneumococcal Polysaccharide PPV23 2018   • Tuberculin Skin Test-PPD Intradermal 2019       Objective     /60 (BP Location: Right arm, Patient Position: Sitting, Cuff Size: Large)   Pulse 93   Temp 98 7 °F (37 1 °C) (Temporal)   Resp 16   Ht 5' 8\" (1 727 m)   Wt 135 kg (298 lb)   SpO2 96%   BMI 45 31 kg/m²     Physical Exam  Vitals reviewed  Constitutional:       General: She is not in acute distress  Appearance: She is morbidly obese  She is not ill-appearing or diaphoretic  HENT:      Head: Normocephalic and atraumatic  Cardiovascular:      Rate and Rhythm: Normal rate and regular rhythm  Pulmonary:      Effort: Pulmonary effort is normal  No tachypnea  Musculoskeletal:      Right lower le+ Edema present  Left lower le+ Edema present  Feet:      Right foot:      Skin integrity: Callus and dry skin present  No skin breakdown  Toenail Condition: Right toenails are abnormally thick  Left foot:      Skin integrity: Ulcer (approx 1\" ulceration plantar surface left heel with serous drainage without signs of infection) present  Toenail Condition: Left toenails are abnormally thick  Skin:     General: Skin is warm and dry  Neurological:      Mental Status: She is alert and oriented to person, place, and time  Gait: Gait abnormal    Psychiatric:         Attention and Perception: Attention normal          Mood and Affect: Mood and affect normal          Speech: Speech normal          Behavior: Behavior normal          Thought Content:  Thought content normal        CONCETTA Mcintosh  "

## 2023-05-22 NOTE — ASSESSMENT & PLAN NOTE
Pt and son have been sleeping on bench outside near hospital on [address] or staying [place] the [place] in [facility] overnight  They are carrying all of their belongings with them  -  met with pt while in office to discuss resources

## 2023-05-22 NOTE — ASSESSMENT & PLAN NOTE
Pt states previous providers have attempted to wean off prednisone too quickly  Has been on 4 mg daily since 2018  - Referral to Rheumatology

## 2023-05-22 NOTE — ASSESSMENT & PLAN NOTE
BP at goal in office today: 112/60   - Continue amlodipine 5 mg daily, hydralazine 10 mg TID, losartan 100 mg daily, metoprolol 100 mg BID    - Continue low-salt diet and daily physical activity

## 2023-05-22 NOTE — ASSESSMENT & PLAN NOTE
- Reviewed hospital follow up appts, specialist referrals, and current medications with pt  Pt stated understanding of all   - Message sent to referral team for assistance scheduling Rheumatology and Wound Care

## 2023-05-22 NOTE — ASSESSMENT & PLAN NOTE
Pt states she and son have not eaten since hospital discharge  - Advised pt to go to [facility] for meal after appt  -  met with pt while in office to discuss resources

## 2023-05-24 ENCOUNTER — PATIENT OUTREACH (OUTPATIENT)
Dept: FAMILY MEDICINE CLINIC | Facility: CLINIC | Age: 63
End: 2023-05-24

## 2023-05-24 NOTE — PROGRESS NOTES
HRR     I called the patient (who is well known to me) and she updated me on her homeless situation  She notes both her [place] her [place]  They offer her a wheelchair during her time there which helps her staying off her feet  Otherwise she uses a run down walker  The patient reports the blister on her foot opened but she is keeping the area covered  She states she is unable to climb any steps  The patient notes her son would be accepted into a men's shelter but there are no openings for her  She states she has $1 to her  name and cannot receive SS benefits for 2 years; she is currently on disability  The patient states she has not yet picked up any of her medications; will discuss with MITZI ONEILL  She states she is eating infrequently and is losing some weight  She did note she continues to watch her sodium/sugar/carb intake  The patient states she has a friend in [facility] who offered housing for her there and she is considering this  I will continue to follow  Chart reviewed

## 2023-05-25 ENCOUNTER — PATIENT OUTREACH (OUTPATIENT)
Dept: FAMILY MEDICINE CLINIC | Facility: CLINIC | Age: 63
End: 2023-05-25

## 2023-05-25 NOTE — PROGRESS NOTES
MITZI ONEILL attempted to call pt to follow up on pt's status  MITZI ONEILL called pt twice, however, call went to voicemail and pt's mailbox is full  MITZI ONEILL was given the option to send an SMS notification  MITZI ONEILL sent the SMS notification  MITZI ONEILL will continue to be available for any additional needs as requested

## 2023-05-26 ENCOUNTER — PATIENT OUTREACH (OUTPATIENT)
Dept: FAMILY MEDICINE CLINIC | Facility: CLINIC | Age: 63
End: 2023-05-26

## 2023-05-26 NOTE — PROGRESS NOTES
MITZI ONEILL attempted to call pt twice, however, the call went to voicemail  MITZI ONEILL was unable to leave a message as the mailbox was full  MITZI ONEILL attempted to call pt's son twice, however, the call was disconnected  MITZI ONEILL will continue to be available for any additional needs as requested

## 2023-05-28 ENCOUNTER — APPOINTMENT (EMERGENCY)
Dept: RADIOLOGY | Facility: HOSPITAL | Age: 63
DRG: 940 | End: 2023-05-28
Payer: COMMERCIAL

## 2023-05-28 ENCOUNTER — HOSPITAL ENCOUNTER (INPATIENT)
Facility: HOSPITAL | Age: 63
LOS: 1 days | Discharge: HOME | DRG: 940 | End: 2023-05-30
Attending: EMERGENCY MEDICINE | Admitting: INTERNAL MEDICINE
Payer: COMMERCIAL

## 2023-05-28 DIAGNOSIS — J45.21 MILD INTERMITTENT ASTHMA WITH EXACERBATION: ICD-10-CM

## 2023-05-28 DIAGNOSIS — M35.3 POLYMYALGIA RHEUMATICA (HCC): ICD-10-CM

## 2023-05-28 DIAGNOSIS — E86.0 DEHYDRATION: ICD-10-CM

## 2023-05-28 DIAGNOSIS — Z59.00 HOMELESSNESS: Primary | ICD-10-CM

## 2023-05-28 DIAGNOSIS — Z86.39 HISTORY OF DIABETES MELLITUS: ICD-10-CM

## 2023-05-28 DIAGNOSIS — Z59.00 HOMELESS: ICD-10-CM

## 2023-05-28 DIAGNOSIS — E11.621 DIABETIC FOOT ULCERS (HCC): ICD-10-CM

## 2023-05-28 DIAGNOSIS — L97.509 DIABETIC FOOT ULCERS (HCC): ICD-10-CM

## 2023-05-28 DIAGNOSIS — Z79.4 TYPE 2 DIABETES MELLITUS WITH HYPERGLYCEMIA, WITH LONG-TERM CURRENT USE OF INSULIN (HCC): ICD-10-CM

## 2023-05-28 DIAGNOSIS — N28.9 RENAL INSUFFICIENCY: ICD-10-CM

## 2023-05-28 DIAGNOSIS — E11.65 TYPE 2 DIABETES MELLITUS WITH HYPERGLYCEMIA, WITH LONG-TERM CURRENT USE OF INSULIN (HCC): ICD-10-CM

## 2023-05-28 DIAGNOSIS — Z59.48: ICD-10-CM

## 2023-05-28 DIAGNOSIS — E11.9 DIABETES MELLITUS (HCC): ICD-10-CM

## 2023-05-28 DIAGNOSIS — I10 PRIMARY HYPERTENSION: ICD-10-CM

## 2023-05-28 DIAGNOSIS — N17.9 ACUTE KIDNEY INJURY (HCC): ICD-10-CM

## 2023-05-28 DIAGNOSIS — Z59.41 FOOD INSECURITY: ICD-10-CM

## 2023-05-28 DIAGNOSIS — R56.9 SEIZURES (HCC): ICD-10-CM

## 2023-05-28 DIAGNOSIS — R53.83 FATIGUE: ICD-10-CM

## 2023-05-28 DIAGNOSIS — E78.49 OTHER HYPERLIPIDEMIA: ICD-10-CM

## 2023-05-28 PROBLEM — R74.8 ELEVATED CREATINE KINASE: Status: RESOLVED | Noted: 2023-05-28 | Resolved: 2023-05-28

## 2023-05-28 PROBLEM — R74.8 ELEVATED CREATINE KINASE: Status: ACTIVE | Noted: 2023-05-28

## 2023-05-28 LAB
ALBUMIN SERPL BCP-MCNC: 3.4 G/DL (ref 3.5–5)
ALP SERPL-CCNC: 123 U/L (ref 34–104)
ALT SERPL W P-5'-P-CCNC: 24 U/L (ref 7–52)
ANION GAP SERPL CALCULATED.3IONS-SCNC: 9 MMOL/L (ref 4–13)
AST SERPL W P-5'-P-CCNC: 16 U/L (ref 13–39)
ATRIAL RATE: 93 BPM
BASOPHILS # BLD AUTO: 0.06 THOUSANDS/ÂΜL (ref 0–0.1)
BASOPHILS NFR BLD AUTO: 1 % (ref 0–1)
BILIRUB SERPL-MCNC: 0.56 MG/DL (ref 0.2–1)
BUN SERPL-MCNC: 19 MG/DL (ref 5–25)
CALCIUM ALBUM COR SERPL-MCNC: 9.7 MG/DL (ref 8.3–10.1)
CALCIUM SERPL-MCNC: 9.2 MG/DL (ref 8.4–10.2)
CARDIAC TROPONIN I PNL SERPL HS: <2 NG/L
CHLORIDE SERPL-SCNC: 104 MMOL/L (ref 96–108)
CO2 SERPL-SCNC: 23 MMOL/L (ref 21–32)
CREAT SERPL-MCNC: 1.49 MG/DL (ref 0.6–1.3)
EOSINOPHIL # BLD AUTO: 0.17 THOUSAND/ÂΜL (ref 0–0.61)
EOSINOPHIL NFR BLD AUTO: 2 % (ref 0–6)
ERYTHROCYTE [DISTWIDTH] IN BLOOD BY AUTOMATED COUNT: 19.1 % (ref 11.6–15.1)
GFR SERPL CREATININE-BSD FRML MDRD: 36 ML/MIN/1.73SQ M
GLUCOSE SERPL-MCNC: 196 MG/DL (ref 65–140)
GLUCOSE SERPL-MCNC: 252 MG/DL (ref 65–140)
GLUCOSE SERPL-MCNC: 263 MG/DL (ref 65–140)
HCT VFR BLD AUTO: 34.5 % (ref 34.8–46.1)
HGB BLD-MCNC: 10.5 G/DL (ref 11.5–15.4)
IMM GRANULOCYTES # BLD AUTO: 0.08 THOUSAND/UL (ref 0–0.2)
IMM GRANULOCYTES NFR BLD AUTO: 1 % (ref 0–2)
LYMPHOCYTES # BLD AUTO: 1.59 THOUSANDS/ÂΜL (ref 0.6–4.47)
LYMPHOCYTES NFR BLD AUTO: 17 % (ref 14–44)
MAGNESIUM SERPL-MCNC: 1.9 MG/DL (ref 1.9–2.7)
MCH RBC QN AUTO: 23.4 PG (ref 26.8–34.3)
MCHC RBC AUTO-ENTMCNC: 30.4 G/DL (ref 31.4–37.4)
MCV RBC AUTO: 77 FL (ref 82–98)
MONOCYTES # BLD AUTO: 0.62 THOUSAND/ÂΜL (ref 0.17–1.22)
MONOCYTES NFR BLD AUTO: 7 % (ref 4–12)
NEUTROPHILS # BLD AUTO: 6.8 THOUSANDS/ÂΜL (ref 1.85–7.62)
NEUTS SEG NFR BLD AUTO: 72 % (ref 43–75)
NRBC BLD AUTO-RTO: 0 /100 WBCS
P AXIS: 61 DEGREES
PHOSPHATE SERPL-MCNC: 3.8 MG/DL (ref 2.3–4.1)
PLATELET # BLD AUTO: 326 THOUSANDS/UL (ref 149–390)
PMV BLD AUTO: 10.7 FL (ref 8.9–12.7)
POTASSIUM SERPL-SCNC: 3.8 MMOL/L (ref 3.5–5.3)
PR INTERVAL: 164 MS
PROT SERPL-MCNC: 7 G/DL (ref 6.4–8.4)
QRS AXIS: 25 DEGREES
QRSD INTERVAL: 94 MS
QT INTERVAL: 470 MS
QTC INTERVAL: 584 MS
RBC # BLD AUTO: 4.48 MILLION/UL (ref 3.81–5.12)
SODIUM SERPL-SCNC: 136 MMOL/L (ref 135–147)
T WAVE AXIS: 69 DEGREES
VENTRICULAR RATE: 93 BPM
WBC # BLD AUTO: 9.32 THOUSAND/UL (ref 4.31–10.16)

## 2023-05-28 PROCEDURE — 93005 ELECTROCARDIOGRAM TRACING: CPT

## 2023-05-28 PROCEDURE — 80053 COMPREHEN METABOLIC PANEL: CPT | Performed by: EMERGENCY MEDICINE

## 2023-05-28 PROCEDURE — 99223 1ST HOSP IP/OBS HIGH 75: CPT | Performed by: INTERNAL MEDICINE

## 2023-05-28 PROCEDURE — 82948 REAGENT STRIP/BLOOD GLUCOSE: CPT

## 2023-05-28 PROCEDURE — 93010 ELECTROCARDIOGRAM REPORT: CPT | Performed by: INTERNAL MEDICINE

## 2023-05-28 PROCEDURE — 83735 ASSAY OF MAGNESIUM: CPT | Performed by: EMERGENCY MEDICINE

## 2023-05-28 PROCEDURE — 85025 COMPLETE CBC W/AUTO DIFF WBC: CPT | Performed by: EMERGENCY MEDICINE

## 2023-05-28 PROCEDURE — 36415 COLL VENOUS BLD VENIPUNCTURE: CPT | Performed by: EMERGENCY MEDICINE

## 2023-05-28 PROCEDURE — 84484 ASSAY OF TROPONIN QUANT: CPT | Performed by: EMERGENCY MEDICINE

## 2023-05-28 PROCEDURE — 96360 HYDRATION IV INFUSION INIT: CPT

## 2023-05-28 PROCEDURE — 99284 EMERGENCY DEPT VISIT MOD MDM: CPT

## 2023-05-28 PROCEDURE — 84100 ASSAY OF PHOSPHORUS: CPT

## 2023-05-28 PROCEDURE — 71046 X-RAY EXAM CHEST 2 VIEWS: CPT

## 2023-05-28 RX ORDER — DOCUSATE SODIUM 100 MG/1
100 CAPSULE, LIQUID FILLED ORAL 2 TIMES DAILY
Status: DISCONTINUED | OUTPATIENT
Start: 2023-05-28 | End: 2023-05-30 | Stop reason: HOSPADM

## 2023-05-28 RX ORDER — INSULIN GLARGINE 100 [IU]/ML
25 INJECTION, SOLUTION SUBCUTANEOUS EVERY 12 HOURS SCHEDULED
Status: DISCONTINUED | OUTPATIENT
Start: 2023-05-28 | End: 2023-05-30 | Stop reason: HOSPADM

## 2023-05-28 RX ORDER — HYDRALAZINE HYDROCHLORIDE 10 MG/1
10 TABLET, FILM COATED ORAL 3 TIMES DAILY
Status: DISCONTINUED | OUTPATIENT
Start: 2023-05-28 | End: 2023-05-30 | Stop reason: HOSPADM

## 2023-05-28 RX ORDER — BUDESONIDE AND FORMOTEROL FUMARATE DIHYDRATE 160; 4.5 UG/1; UG/1
1 AEROSOL RESPIRATORY (INHALATION) 2 TIMES DAILY
Status: DISCONTINUED | OUTPATIENT
Start: 2023-05-28 | End: 2023-05-30 | Stop reason: HOSPADM

## 2023-05-28 RX ORDER — ACETAMINOPHEN 325 MG/1
650 TABLET ORAL EVERY 6 HOURS PRN
Status: DISCONTINUED | OUTPATIENT
Start: 2023-05-28 | End: 2023-05-30 | Stop reason: HOSPADM

## 2023-05-28 RX ORDER — TOPIRAMATE 100 MG/1
100 TABLET, FILM COATED ORAL
Status: DISCONTINUED | OUTPATIENT
Start: 2023-05-28 | End: 2023-05-30 | Stop reason: HOSPADM

## 2023-05-28 RX ORDER — AMLODIPINE BESYLATE 5 MG/1
5 TABLET ORAL DAILY
Status: DISCONTINUED | OUTPATIENT
Start: 2023-05-29 | End: 2023-05-30 | Stop reason: HOSPADM

## 2023-05-28 RX ORDER — HEPARIN SODIUM 5000 [USP'U]/ML
5000 INJECTION, SOLUTION INTRAVENOUS; SUBCUTANEOUS EVERY 8 HOURS SCHEDULED
Status: DISCONTINUED | OUTPATIENT
Start: 2023-05-28 | End: 2023-05-30 | Stop reason: HOSPADM

## 2023-05-28 RX ORDER — MELATONIN
2000 DAILY
Status: DISCONTINUED | OUTPATIENT
Start: 2023-05-29 | End: 2023-05-30 | Stop reason: HOSPADM

## 2023-05-28 RX ORDER — PREDNISONE 1 MG/1
4 TABLET ORAL DAILY
Status: CANCELLED | OUTPATIENT
Start: 2023-05-29

## 2023-05-28 RX ORDER — INSULIN LISPRO 100 [IU]/ML
5 INJECTION, SOLUTION INTRAVENOUS; SUBCUTANEOUS
Status: DISCONTINUED | OUTPATIENT
Start: 2023-05-28 | End: 2023-05-30 | Stop reason: HOSPADM

## 2023-05-28 RX ORDER — SIMETHICONE 80 MG
80 TABLET,CHEWABLE ORAL 4 TIMES DAILY PRN
Status: DISCONTINUED | OUTPATIENT
Start: 2023-05-28 | End: 2023-05-30 | Stop reason: HOSPADM

## 2023-05-28 RX ORDER — MAGNESIUM HYDROXIDE/ALUMINUM HYDROXICE/SIMETHICONE 120; 1200; 1200 MG/30ML; MG/30ML; MG/30ML
30 SUSPENSION ORAL EVERY 6 HOURS PRN
Status: DISCONTINUED | OUTPATIENT
Start: 2023-05-28 | End: 2023-05-30 | Stop reason: HOSPADM

## 2023-05-28 RX ORDER — ATORVASTATIN CALCIUM 40 MG/1
40 TABLET, FILM COATED ORAL DAILY
Status: DISCONTINUED | OUTPATIENT
Start: 2023-05-29 | End: 2023-05-30 | Stop reason: HOSPADM

## 2023-05-28 RX ORDER — GABAPENTIN 100 MG/1
100 CAPSULE ORAL 2 TIMES DAILY
Status: DISCONTINUED | OUTPATIENT
Start: 2023-05-28 | End: 2023-05-30 | Stop reason: HOSPADM

## 2023-05-28 RX ORDER — ONDANSETRON 2 MG/ML
4 INJECTION INTRAMUSCULAR; INTRAVENOUS EVERY 6 HOURS PRN
Status: DISCONTINUED | OUTPATIENT
Start: 2023-05-28 | End: 2023-05-30 | Stop reason: HOSPADM

## 2023-05-28 RX ORDER — CALCIUM CARBONATE 500 MG/1
1000 TABLET, CHEWABLE ORAL DAILY PRN
Status: DISCONTINUED | OUTPATIENT
Start: 2023-05-28 | End: 2023-05-30 | Stop reason: HOSPADM

## 2023-05-28 RX ORDER — PREDNISONE 1 MG/1
4 TABLET ORAL DAILY
Status: DISCONTINUED | OUTPATIENT
Start: 2023-05-29 | End: 2023-05-30 | Stop reason: HOSPADM

## 2023-05-28 RX ORDER — METOPROLOL TARTRATE 100 MG/1
100 TABLET ORAL EVERY 12 HOURS SCHEDULED
Status: DISCONTINUED | OUTPATIENT
Start: 2023-05-28 | End: 2023-05-30 | Stop reason: HOSPADM

## 2023-05-28 RX ADMIN — METOPROLOL TARTRATE 100 MG: 100 TABLET, FILM COATED ORAL at 20:39

## 2023-05-28 RX ADMIN — TOPIRAMATE 100 MG: 100 TABLET, FILM COATED ORAL at 22:24

## 2023-05-28 RX ADMIN — HYDRALAZINE HYDROCHLORIDE 10 MG: 10 TABLET, FILM COATED ORAL at 20:39

## 2023-05-28 RX ADMIN — INSULIN GLARGINE 25 UNITS: 100 INJECTION, SOLUTION SUBCUTANEOUS at 22:22

## 2023-05-28 RX ADMIN — BUDESONIDE AND FORMOTEROL FUMARATE DIHYDRATE 1 PUFF: 160; 4.5 AEROSOL RESPIRATORY (INHALATION) at 20:57

## 2023-05-28 RX ADMIN — DOCUSATE SODIUM 100 MG: 100 CAPSULE, LIQUID FILLED ORAL at 20:39

## 2023-05-28 RX ADMIN — HEPARIN SODIUM 5000 UNITS: 5000 INJECTION INTRAVENOUS; SUBCUTANEOUS at 22:24

## 2023-05-28 RX ADMIN — SODIUM CHLORIDE 1000 ML: 0.9 INJECTION, SOLUTION INTRAVENOUS at 16:52

## 2023-05-28 RX ADMIN — GABAPENTIN 100 MG: 100 CAPSULE ORAL at 20:39

## 2023-05-28 SDOH — ECONOMIC STABILITY - FOOD INSECURITY: OTHER SPECIFIED LACK OF ADEQUATE FOOD: Z59.48

## 2023-05-28 SDOH — ECONOMIC STABILITY - FOOD INSECURITY: FOOD INSECURITY: Z59.41

## 2023-05-28 SDOH — ECONOMIC STABILITY - HOUSING INSECURITY: HOMELESSNESS UNSPECIFIED: Z59.00

## 2023-05-28 NOTE — ED ATTENDING ATTESTATION
5/28/2023  ICraig DO, saw and evaluated the patient  I have discussed the patient with the resident/non-physician practitioner and agree with the resident's/non-physician practitioner's findings, Plan of Care, and MDM as documented in the resident's/non-physician practitioner's note, except where noted  All available labs and Radiology studies were reviewed  I was present for key portions of any procedure(s) performed by the resident/non-physician practitioner and I was immediately available to provide assistance  At this point I agree with the current assessment done in the Emergency Department  I have conducted an independent evaluation of this patient a history and physical is as follows:    ED Course     57 y o  F w/h/o homelessness, DM2, seizures p/w fatigue  Son also providing history  Both are homeless  [place]  Not eating due to no resources  Occasionally gets a meal if a bystander buys her food, but reports having no food for a week  Taking some of her medications but not sure which ones due to her legal blindness  Son reports pt having episodes of either syncope or falling asleep while sitting  No head injury  Pt recently seen by her PCP on 5/22 and did have social work involved  SW called [facility] [employer] for assistance but there is no funding for emergency housing and could not offer assistance  She reports there's no openings at [facility] for her  Pt has wounds/blisters on her foot but is unable to get to wound care  No signs of infection at this point  Plan: Labs to r/o anemia/electrolyte abnormality/STEFFI/NSTEMI, EKG to r/o STEMI/arrhythmia, and give IV fluids      Critical Care Time  Procedures

## 2023-05-28 NOTE — ASSESSMENT & PLAN NOTE
Patient and son are both homeless  Socially complex history - patient became homeless back in February and has been noted to have multiple ED visits/admissions to both hospital and rehab centers  Patient was recently discharged from [facility] on 5/8/2023 and has been homeless since then

## 2023-05-28 NOTE — ASSESSMENT & PLAN NOTE
Patient with elevated creatinine without any evidence of renal failure  Patient given IV fluid by the ED  We will hold losartan as well as Lasix for now  We will allow renal recovery with plan for resumption of home meds in the next 24 hours  Recent Labs     05/28/23  8521   CREATININE 1 49*

## 2023-05-28 NOTE — ED PROVIDER NOTES
"History  Chief Complaint   Patient presents with   • Fatigue     Pt has been without food for nearly one week  Pt reports drinking some water  Is out of some of her medications that she takes for diabetes, HTN  Son reports they are homeless - [place]  Son also notes that pt has had syncopal and near syncopal episodes over the last few days  HPI  Nakul Garcia is a 57 y o  female with PMH DM, HTN who presents to the emergency department with fatigue  She has been experiencing homelessness and reports she has not eaten in the past week and has limited ability to take her medications  She reports progressive fatigue over this time frame as well as shortness of breath  She denies fevers, chest pain, abdominal pain, cough, vomiting, or dysuria  She has a left heel ulcer and last had the dressing changed on Monday, she denies new pain in the foot  Her son reports she has had multiple episodes during the past week where she seems to \" pass out\" while sitting but denies falls or head strike  Prior to Admission Medications   Prescriptions Last Dose Informant Patient Reported? Taking? Blood Glucose Monitoring Suppl (OneTouch Verio Reflect) w/Device KIT   No No   Sig: Use 1 each 4 (four) times a day   Blood Pressure Monitor KIT   No No   Sig: Check blood pressures BID   Continuous Blood Gluc  (FreeStyle Denisa 2 [place]) ERIC   No No   Sig: Use 1 each 4 (four) times a day   Continuous Blood Gluc Sensor (FreeStyle Denisa 2 Sensor) MISC   No No   Sig: Use 1 each every 14 (fourteen) days   Emollient (eucerin) lotion   No No   Sig: Apply 1 application  topically 2 (two) times a day = to B/L LE   Insulin Glargine Solostar 100 UNIT/ML SOPN   No No   Sig: Inject 0 27 mL (27 Units total) under the skin in the morning AND 0 24 mL (24 Units total) daily at bedtime  = Lantus 27 units at 9AM/ Lantus 24 units @ 9PM)     Insulin Pen Needle (BD Pen Needle Tita U/F) 32G X 4 MM MISC   " No No   Sig: Use four times daily as directed with insulin pen   Lancets (OneTouch Delica Plus [id number]) MISC   No No   Sig: Use 1 each 4 (four) times a day   Nutritional Supplements (ProSource No Carb) LIQD   Yes No   Sig: Take 30 mL by mouth 2 (two) times a day   Petrolatum-Zinc Oxide 57-17 % PSTE   Yes No   Sig: Apply 1 application  topically 3 (three) times a day To sacral area - protective   acetaminophen (TYLENOL) 325 mg tablet   Yes No   Sig: Take 650 mg by mouth every 6 (six) hours as needed for mild pain   amLODIPine (NORVASC) 5 mg tablet   No No   Sig: Take 1 tablet (5 mg total) by mouth daily   aspirin (ECOTRIN LOW STRENGTH) 81 mg EC tablet   No No   Sig: Take 1 tablet (81 mg total) by mouth daily   atorvastatin (LIPITOR) 40 mg tablet   No No   Sig: Take 1 tablet (40 mg total) by mouth daily   budesonide-formoterol (Symbicort) 160-4 5 mcg/act inhaler   No No   Sig: Inhale 1 puff 2 (two) times a day Rinse mouth after use  cholecalciferol (VITAMIN D3) 1,000 units tablet   No No   Sig: Take 2 tablets (2,000 Units total) by mouth daily   docusate sodium (COLACE) 100 mg capsule   No No   Sig: Take 1 capsule (100 mg total) by mouth 2 (two) times a day   furosemide (LASIX) 20 mg tablet   No No   Sig: Take 1 tablet (20 mg total) by mouth 2 (two) times a day   gabapentin (NEURONTIN) 100 mg capsule   No No   Sig: Take 1 capsule (100 mg total) by mouth 2 (two) times a day   glucose blood (OneTouch Verio) test strip   No No   Sig: Check blood sugars four times daily  Please substitute with appropriate alternative as covered by patient's insurance  Dx: E11 65   hydrALAZINE (APRESOLINE) 10 mg tablet   No No   Sig: Take 1 tablet (10 mg total) by mouth 3 (three) times a day   insulin lispro (HumaLOG) 100 units/mL injection   Yes No   Sig: Inject under the skin Sliding scale   Inject as per sliding scale:  if 0 - 150 = 0 unit;  151 - 200 = 2 units;  201 - 250 = 4 units;  251 - 300 = 6 units;  301 - 350 = 8 units;  351 - 400 = 10 units less than 70 or above 400 notify MD,  subcutaneously before meals and at bedtime for DM   insulin lispro (HumaLOG) 100 units/mL injection pen   No No   Sig: Inject 5 Units under the skin 3 (three) times a day with meals = also have SSI for  and higher   loperamide (IMODIUM) 2 mg capsule   No No   Sig: Take 1 capsule (2 mg total) by mouth 4 (four) times a day as needed for diarrhea (can have up to 8mg per day)   losartan (COZAAR) 100 MG tablet   No No   Sig: Take 1 tablet (100 mg total) by mouth daily   metFORMIN (GLUCOPHAGE) 1000 MG tablet   No No   Sig: Take 1 tablet (1,000 mg total) by mouth 2 (two) times a day with meals   metoprolol tartrate (LOPRESSOR) 100 mg tablet   No No   Sig: Take 1 tablet (100 mg total) by mouth every 12 (twelve) hours   multivitamin-iron-minerals-folic acid (THERAPEUTIC-M) TABS tablet   No No   Sig: Take 1 tablet by mouth daily   predniSONE 1 mg tablet   No No   Sig: Take 4 tablets (4 mg total) by mouth daily   topiramate (TOPAMAX) 100 mg tablet   No No   Sig: Take 1 tablet (100 mg total) by mouth daily at bedtime      Facility-Administered Medications: None       Past Medical History:   Diagnosis Date   • Anemia    • Arthritis    • Benign hypertension     Procedure/Onset: 01/01/2005; Description: BP elevated today  Pt reports running out of BP meds 2wks ago  Will resume BP meds     • Diabetes mellitus ([facility] )    • Diabetes mellitus type 2 with complications, uncontrolled 9/8/2015   • Dyspnea on exertion    • Hyperlipidemia    • Hypertension    • Legally blind 2010   • Mild intermittent asthma with exacerbation 8/4/2018   • Miscarriage     x 3   • Polymyalgia rheumatica ([facility] [address] ) 11/24/2021   • Seizures ([facility] [address] )    • Sickle cell trait ([facility] [address] )    • Sleep apnea    • Stage 3a chronic kidney disease ([facility] [address] ) 5/19/2022   • Thyroid nodule 3/6/2020       Past Surgical History:   Procedure Laterality Date   • CATARACT EXTRACTION Bilateral     august and september   • EYE SURGERY     • HYSTERECTOMY      37   • OOPHORECTOMY Left     37   • KY LIGATION/BIOPSY TEMPORAL ARTERY Bilateral 10/17/2019    Procedure: BIOPSY ARTERY TEMPORAL;  Surgeon: Philippe Reyes DO;  Location: [facility];  Service: Vascular   • US GUIDED THYROID BIOPSY  5/13/2020       Family History   Problem Relation Age of Onset   • Heart attack Mother    • Heart disease Mother    • Hypertension Mother    • No Known Problems Father    • Heart disease Sister    • No Known Problems Brother    • No Known Problems Son    • No Known Problems Daughter    • Heart attack Maternal Grandmother    • Heart disease Maternal Grandmother    • Diabetes Other    • Heart attack Other    • No Known Problems Sister    • No Known Problems Sister    • No Known Problems Paternal Aunt    • No Known Problems Son    • Cancer Neg Hx    • Stroke Neg Hx      I have reviewed and agree with the history as documented  E-Cigarette/Vaping   • E-Cigarette Use Never User      E-Cigarette/Vaping Substances     Social History     Tobacco Use   • Smoking status: Never   • Smokeless tobacco: Never   Vaping Use   • Vaping Use: Never used   Substance Use Topics   • Alcohol use: Never     Alcohol/week: 0 0 standard drinks of alcohol   • Drug use: No       Home medications:  Prior to Admission Medications   Prescriptions Last Dose Informant Patient Reported? Taking? Blood Glucose Monitoring Suppl (OneTouch Verio Reflect) w/Device KIT   No No   Sig: Use 1 each 4 (four) times a day   Blood Pressure Monitor KIT   No No   Sig: Check blood pressures BID   Continuous Blood Gluc  (FreeStyle Denisa 2 [place]) ERIC   No No   Sig: Use 1 each 4 (four) times a day   Continuous Blood Gluc Sensor (FreeStyle Denisa 2 Sensor) MISC   No No   Sig: Use 1 each every 14 (fourteen) days   Emollient (eucerin) lotion   No No   Sig: Apply 1 application   topically 2 (two) times a day = to B/L LE   Insulin Glargine Solostar 100 UNIT/ML SOPN   No No   Sig: Inject 0 27 mL (27 Units total) under the skin in the morning AND 0 24 mL (24 Units total) daily at bedtime  = Lantus 27 units at 9AM/ Lantus 24 units @ 9PM)  Insulin Pen Needle (BD Pen Needle Tita U/F) 32G X 4 MM MISC   No No   Sig: Use four times daily as directed with insulin pen   Lancets (OneTouch Delica Plus [id number]) MISC   No No   Sig: Use 1 each 4 (four) times a day   Nutritional Supplements (ProSource No Carb) LIQD   Yes No   Sig: Take 30 mL by mouth 2 (two) times a day   Petrolatum-Zinc Oxide 57-17 % PSTE   Yes No   Sig: Apply 1 application  topically 3 (three) times a day To sacral area - protective   acetaminophen (TYLENOL) 325 mg tablet   Yes No   Sig: Take 650 mg by mouth every 6 (six) hours as needed for mild pain   amLODIPine (NORVASC) 5 mg tablet   No No   Sig: Take 1 tablet (5 mg total) by mouth daily   aspirin (ECOTRIN LOW STRENGTH) 81 mg EC tablet   No No   Sig: Take 1 tablet (81 mg total) by mouth daily   atorvastatin (LIPITOR) 40 mg tablet   No No   Sig: Take 1 tablet (40 mg total) by mouth daily   budesonide-formoterol (Symbicort) 160-4 5 mcg/act inhaler   No No   Sig: Inhale 1 puff 2 (two) times a day Rinse mouth after use  cholecalciferol (VITAMIN D3) 1,000 units tablet   No No   Sig: Take 2 tablets (2,000 Units total) by mouth daily   docusate sodium (COLACE) 100 mg capsule   No No   Sig: Take 1 capsule (100 mg total) by mouth 2 (two) times a day   furosemide (LASIX) 20 mg tablet   No No   Sig: Take 1 tablet (20 mg total) by mouth 2 (two) times a day   gabapentin (NEURONTIN) 100 mg capsule   No No   Sig: Take 1 capsule (100 mg total) by mouth 2 (two) times a day   glucose blood (OneTouch Verio) test strip   No No   Sig: Check blood sugars four times daily  Please substitute with appropriate alternative as covered by patient's insurance   Dx: E11 65   hydrALAZINE (APRESOLINE) 10 mg tablet   No No   Sig: Take 1 tablet (10 mg total) by mouth 3 (three) times a day   insulin lispro (HumaLOG) 100 units/mL injection   Yes No   Sig: Inject under the skin Sliding scale  Inject as per sliding scale:  if 0 - 150 = 0 unit;  151 - 200 = 2 units;  201 - 250 = 4 units;  251 - 300 = 6 units;  301 - 350 = 8 units;  351 - 400 = 10 units less than 70 or above 400 notify MD,  subcutaneously before meals and at bedtime for DM   insulin lispro (HumaLOG) 100 units/mL injection pen   No No   Sig: Inject 5 Units under the skin 3 (three) times a day with meals = also have SSI for  and higher   loperamide (IMODIUM) 2 mg capsule   No No   Sig: Take 1 capsule (2 mg total) by mouth 4 (four) times a day as needed for diarrhea (can have up to 8mg per day)   losartan (COZAAR) 100 MG tablet   No No   Sig: Take 1 tablet (100 mg total) by mouth daily   metFORMIN (GLUCOPHAGE) 1000 MG tablet   No No   Sig: Take 1 tablet (1,000 mg total) by mouth 2 (two) times a day with meals   metoprolol tartrate (LOPRESSOR) 100 mg tablet   No No   Sig: Take 1 tablet (100 mg total) by mouth every 12 (twelve) hours   multivitamin-iron-minerals-folic acid (THERAPEUTIC-M) TABS tablet   No No   Sig: Take 1 tablet by mouth daily   predniSONE 1 mg tablet   No No   Sig: Take 4 tablets (4 mg total) by mouth daily   topiramate (TOPAMAX) 100 mg tablet   No No   Sig: Take 1 tablet (100 mg total) by mouth daily at bedtime      Facility-Administered Medications: None     Allergies:  No Known Allergies     Review of Systems   Constitutional: Positive for fatigue  Negative for fever  Respiratory: Positive for shortness of breath  Negative for cough  Cardiovascular: Negative for chest pain  Gastrointestinal: Negative for abdominal pain, nausea and vomiting  Genitourinary: Negative for dysuria  All other systems reviewed and are negative        Physical Exam  ED Triage Vitals   Temperature Pulse Respirations Blood Pressure SpO2   05/28/23 1619 05/28/23 1610 05/28/23 1610 05/28/23 1610 05/28/23 1610   98 1 °F (36 7 °C) 93 19 127/65 100 %      Temp Source Heart Rate Source Patient Position - Orthostatic VS BP Location FiO2 (%)   05/28/23 1619 05/28/23 1610 05/28/23 1610 05/28/23 1610 --   Oral Monitor Lying Right arm       Pain Score       05/28/23 1610       8             Orthostatic Vital Signs  Vitals:    05/28/23 1610 05/28/23 1835   BP: 127/65 160/86   Pulse: 93 103   Patient Position - Orthostatic VS: Lying Lying       Physical Exam  Vitals and nursing note reviewed  Constitutional:       General: She is not in acute distress  Comments: Fatigued   HENT:      Head: Normocephalic  Mouth/Throat:      Mouth: Mucous membranes are dry  Cardiovascular:      Rate and Rhythm: Normal rate and regular rhythm  Heart sounds: No murmur heard  Pulmonary:      Effort: Pulmonary effort is normal  No respiratory distress  Breath sounds: No wheezing, rhonchi or rales  Abdominal:      General: Abdomen is flat  There is no distension  Palpations: Abdomen is soft  Tenderness: There is no abdominal tenderness  There is no guarding or rebound  Musculoskeletal:      Right lower leg: Edema present  Left lower leg: Edema present  Comments: Left heel shallow ulcer, no surrounding erythema or purulence, no tenderness  Light touch sensation intact throughout feet, able to move feet symmetrically  Skin:     General: Skin is warm and dry  Neurological:      Mental Status: She is alert           ED Medications  Medications   acetaminophen (TYLENOL) tablet 650 mg (has no administration in time range)   amLODIPine (NORVASC) tablet 5 mg (has no administration in time range)   aspirin (ECOTRIN LOW STRENGTH) EC tablet 81 mg (has no administration in time range)   atorvastatin (LIPITOR) tablet 40 mg (has no administration in time range)   budesonide-formoterol (SYMBICORT) 160-4 5 mcg/act inhaler 1 puff (1 puff Inhalation Given 5/28/23 2057)   cholecalciferol (VITAMIN D3) tablet 2,000 Units (has no administration in time range)   gabapentin (NEURONTIN) capsule 100 mg (100 mg Oral Given 5/28/23 2039)   hydrALAZINE (APRESOLINE) tablet 10 mg (10 mg Oral Given 5/28/23 2039)   insulin glargine (LANTUS) subcutaneous injection 25 Units 0 25 mL (has no administration in time range)   insulin lispro (HumaLOG) 100 units/mL subcutaneous injection 5 Units (has no administration in time range)   metoprolol tartrate (LOPRESSOR) tablet 100 mg (100 mg Oral Given 5/28/23 2039)   topiramate (TOPAMAX) tablet 100 mg (has no administration in time range)   docusate sodium (COLACE) capsule 100 mg (100 mg Oral Given 5/28/23 2039)   ondansetron (ZOFRAN) injection 4 mg (has no administration in time range)   aluminum-magnesium hydroxide-simethicone (MYLANTA) oral suspension 30 mL (has no administration in time range)   calcium carbonate (TUMS) chewable tablet 1,000 mg (has no administration in time range)   simethicone (MYLICON) chewable tablet 80 mg (has no administration in time range)   heparin (porcine) subcutaneous injection 5,000 Units (has no administration in time range)   predniSONE tablet 4 mg (has no administration in time range)   sodium chloride 0 9 % bolus 1,000 mL (1,000 mL Intravenous New Bag 5/28/23 1652)       Diagnostic Studies  Results Reviewed     Procedure Component Value Units Date/Time    Phosphorus [[id number]]  (Normal) Collected: 05/28/23 1702    Lab Status: Final result Specimen: Blood from Arm, Left Updated: 05/28/23 1726     Phosphorus 3 8 mg/dL     HS Troponin 0hr (reflex protocol) [[id number]]  (Normal) Collected: 05/28/23 1653    Lab Status: Final result Specimen: Blood from Arm, Left Updated: 05/28/23 1723     hs TnI 0hr <2 ng/L     Comprehensive metabolic panel [[id number]]  (Abnormal) Collected: 05/28/23 1653    Lab Status: Final result Specimen: Blood from Arm, Left Updated: 05/28/23 1714     Sodium 136 mmol/L      Potassium 3 8 mmol/L      Chloride 104 mmol/L      CO2 23 mmol/L      ANION GAP 9 mmol/L      BUN 19 mg/dL      Creatinine 1 49 mg/dL      Glucose 263 mg/dL Calcium 9 2 mg/dL      Corrected Calcium 9 7 mg/dL      AST 16 U/L      ALT 24 U/L      Alkaline Phosphatase 123 U/L      Total Protein 7 0 g/dL      Albumin 3 4 g/dL      Total Bilirubin 0 56 mg/dL      eGFR 36 ml/min/1 73sq m     Narrative:      [place] guidelines for Chronic Kidney Disease (CKD):   •  Stage 1 with normal or high GFR (GFR > 90 mL/min/1 73 square meters)  •  Stage 2 Mild CKD (GFR = 60-89 mL/min/1 73 square meters)  •  Stage 3A Moderate CKD (GFR = 45-59 mL/min/1 73 square meters)  •  Stage 3B Moderate CKD (GFR = 30-44 mL/min/1 73 square meters)  •  Stage 4 Severe CKD (GFR = 15-29 mL/min/1 73 square meters)  •  Stage 5 End Stage CKD (GFR <15 mL/min/1 73 square meters)  Note: GFR calculation is accurate only with a steady state creatinine    Magnesium [[id number]]  (Normal) Collected: 05/28/23 1653    Lab Status: Final result Specimen: Blood from Arm, Left Updated: 05/28/23 1714     Magnesium 1 9 mg/dL     CBC and differential [[id number]]  (Abnormal) Collected: 05/28/23 1653    Lab Status: Final result Specimen: Blood from Arm, Left Updated: 05/28/23 1700     WBC 9 32 Thousand/uL      RBC 4 48 Million/uL      Hemoglobin 10 5 g/dL      Hematocrit 34 5 %      MCV 77 fL      MCH 23 4 pg      MCHC 30 4 g/dL      RDW 19 1 %      MPV 10 7 fL      Platelets 438 Thousands/uL      nRBC 0 /100 WBCs      Neutrophils Relative 72 %      Immat GRANS % 1 %      Lymphocytes Relative 17 %      Monocytes Relative 7 %      Eosinophils Relative 2 %      Basophils Relative 1 %      Neutrophils Absolute 6 80 Thousands/µL      Immature Grans Absolute 0 08 Thousand/uL      Lymphocytes Absolute 1 59 Thousands/µL      Monocytes Absolute 0 62 Thousand/µL      Eosinophils Absolute 0 17 Thousand/µL      Basophils Absolute 0 06 Thousands/µL     Fingerstick Glucose (POCT) [[id number]]  (Abnormal) Collected: 05/28/23 1611    Lab Status: Final result Updated: 05/28/23 1614     POC Glucose 252 mg/dl XR chest 2 views   ED Interpretation by DO Craig (05/28 9495)   Interpreted by me as no pneumonia  Procedures  Procedures      ED Course                             SBIRT 20yo+    Flowsheet Row Most Recent Value   Initial Alcohol Screen: US AUDIT-C     1  How often do you have a drink containing alcohol? 0 Filed at: 05/28/2023 1614   2  How many drinks containing alcohol do you have on a typical day you are drinking? 0 Filed at: 05/28/2023 1614   3a  Male UNDER 65: How often do you have five or more drinks on one occasion? 0 Filed at: 05/28/2023 1614   3b  FEMALE Any Age, or MALE 65+: How often do you have 4 or more drinks on one occassion? 0 Filed at: 05/28/2023 1614   Audit-C Score 0 Filed at: 05/28/2023 1614   JANIE: How many times in the past year have you    Used an illegal drug or used a prescription medication for non-medical reasons? Never Filed at: 05/28/2023 1614                MDM  MDM  Yusuf Valentin is a 57 y o  female with PMH DM, HTN who presents to the emergency department with fatigue in the setting of homelessness with limited p o  intake and limited ability to take medications  Workup including vital signs, physical exam, EKG, CXR and labs  EKG without acute ischemic changes, CXR without acute cardiopulmonary abnormalities and Labs with STEFFI  Most likely diagnosis STEFFI in setting of poor p o  intake  Treatment including IVF  Plan for admission to Medicine         Disposition  Final diagnoses:   Acute kidney injury ([facility] [address] )   Homelessness   Fatigue   Dehydration   History of diabetes mellitus   Deprivation of food     Time reflects when diagnosis was documented in both MDM as applicable and the Disposition within this note     Time User Action Codes Description Comment    5/28/2023  5:19 PM Jovana Jasmine Add [N17 9] Acute kidney injury ([facility] [address] )     5/28/2023  5:19 PM Barbara Nuñez Add [Z59 00] Homelessness     5/28/2023  5:19 PM Jovana Jasmine Add [R53 83] Fatigue 5/28/2023  5:19 PM Kenroy, Crystal L Add [E86 0] Dehydration     5/28/2023  5:19 PM Kenroy, Crystal L Add [Z86 39] History of diabetes mellitus     5/28/2023  5:22 PM Kenroy, KB [facility]	[place] Add [Z59 48] Deprivation of food     5/28/2023  6:03 PM Damian Rezaive Dec Modify [N17 9] Acute kidney injury ([facility] [address] )     5/28/2023  6:03 PM Alfred Reza Modify [Z59 00] Homelessness     5/28/2023  6:03 PM Honora Britain Modify [R53 83] Fatigue     5/28/2023  6:03 PM Alfred Reza Modify [E86 0] Dehydration     5/28/2023  6:03 PM Honora Britain Modify [Z86 39] History of diabetes mellitus     5/28/2023  6:03 PM Honora Britain Modify [Z59 48] Deprivation of food     5/28/2023  6:05 PM Honora Britain Add [E11 621,  L97 509] Diabetic foot ulcers ([facility] [address] )     5/28/2023  9:16 PM Vandana Mor Add [N28 9] Renal insufficiency     5/28/2023  9:16 PM Vandana Mor Add [Z59 00] Homeless     5/28/2023  9:16 PM Vandana Mor Add [Z59 41] Food insecurity       ED Disposition     ED Disposition   Admit    Condition   Stable    Date/Time   Sun May 28, 2023  5:40 PM    Comment   Case was discussed with Dr Bakari Cook and the patient's admission status was agreed to be Admission Status: observation status to the service of Dr Bakari Cook             Follow-up Information    None         Current Discharge Medication List      CONTINUE these medications which have NOT CHANGED    Details   acetaminophen (TYLENOL) 325 mg tablet Take 650 mg by mouth every 6 (six) hours as needed for mild pain      amLODIPine (NORVASC) 5 mg tablet Take 1 tablet (5 mg total) by mouth daily  Qty: 30 tablet, Refills: 2    Associated Diagnoses: Primary hypertension      aspirin (ECOTRIN LOW STRENGTH) 81 mg EC tablet Take 1 tablet (81 mg total) by mouth daily  Qty: 30 tablet, Refills: 2    Associated Diagnoses: Primary hypertension      atorvastatin (LIPITOR) 40 mg tablet Take 1 tablet (40 mg total) by mouth daily  Qty: 30 tablet, Refills: 2    Comments: Requesting 1 year supply  Associated Diagnoses: Other hyperlipidemia      Blood Glucose Monitoring Suppl (OneTouch Verio Reflect) w/Device KIT Use 1 each 4 (four) times a day  Qty: 1 kit, Refills: 0    Associated Diagnoses: Type 2 diabetes mellitus with hyperglycemia, with long-term current use of insulin ([facility])      Blood Pressure Monitor KIT Check blood pressures BID  Qty: 1 kit, Refills: 0    Associated Diagnoses: Hypertensive urgency      budesonide-formoterol (Symbicort) 160-4 5 mcg/act inhaler Inhale 1 puff 2 (two) times a day Rinse mouth after use  Qty: 10 2 g, Refills: 2    Associated Diagnoses: Mild intermittent asthma with exacerbation      cholecalciferol (VITAMIN D3) 1,000 units tablet Take 2 tablets (2,000 Units total) by mouth daily  Qty: 60 tablet, Refills: 2    Associated Diagnoses: Polymyalgia rheumatica ([facility])      Continuous Blood Gluc  (FreeStyle Denisa 2 [place]) ERIC Use 1 each 4 (four) times a day  Qty: 1 each, Refills: 0    Associated Diagnoses: Type 2 diabetes mellitus with hyperglycemia, with long-term current use of insulin ([facility])      Continuous Blood Gluc Sensor (FreeStyle Denisa 2 Sensor) MISC Use 1 each every 14 (fourteen) days  Qty: 2 each, Refills: 0    Associated Diagnoses: Type 2 diabetes mellitus with hyperglycemia, with long-term current use of insulin ([facility])      docusate sodium (COLACE) 100 mg capsule Take 1 capsule (100 mg total) by mouth 2 (two) times a day  Qty: 60 capsule, Refills: 0    Associated Diagnoses: Ambulatory dysfunction      Emollient (eucerin) lotion Apply 1 application   topically 2 (two) times a day = to B/L LE  Qty: 240 mL, Refills: 0    Associated Diagnoses: Polyneuropathy associated with underlying disease ([facility])      furosemide (LASIX) 20 mg tablet Take 1 tablet (20 mg total) by mouth 2 (two) times a day  Qty: 60 tablet, Refills: 2    Associated Diagnoses: Primary hypertension      gabapentin (NEURONTIN) 100 mg capsule Take 1 capsule (100 mg total) by mouth 2 (two) times a day  Qty: 60 capsule, Refills: 2 Associated Diagnoses: Seizures ([address] )      glucose blood (OneTouch Verio) test strip Check blood sugars four times daily  Please substitute with appropriate alternative as covered by patient's insurance  Dx: E11 65  Qty: 200 each, Refills: 3    Associated Diagnoses: Type 2 diabetes mellitus with hyperglycemia, with long-term current use of insulin (HCC)      hydrALAZINE (APRESOLINE) 10 mg tablet Take 1 tablet (10 mg total) by mouth 3 (three) times a day  Qty: 90 tablet, Refills: 2    Associated Diagnoses: Primary hypertension      Insulin Glargine Solostar 100 UNIT/ML SOPN Inject 0 27 mL (27 Units total) under the skin in the morning AND 0 24 mL (24 Units total) daily at bedtime  = Lantus 27 units at 9AM/ Lantus 24 units @ 9PM)  Qty: 15 mL, Refills: 2    Comments: Please fill only one month supply at a time, as pt has no access to refrigeration  Associated Diagnoses: Type 2 diabetes mellitus with hyperglycemia, with long-term current use of insulin ([facility])      insulin lispro (HumaLOG) 100 units/mL injection pen Inject 5 Units under the skin 3 (three) times a day with meals = also have SSI for  and higher  Qty: 3 mL, Refills: 4    Comments: Please fill only one pen at a time as pt has no access to refrigeration  Associated Diagnoses: Type 2 diabetes mellitus with hyperglycemia, with long-term current use of insulin (HCC)      insulin lispro (HumaLOG) 100 units/mL injection Inject under the skin Sliding scale   Inject as per sliding scale:  if 0 - 150 = 0 unit;  151 - 200 = 2 units;  201 - 250 = 4 units;  251 - 300 = 6 units;  301 - 350 = 8 units;  351 - 400 = 10 units less than 70 or above 400 notify MD,  subcutaneously before meals and at bedtime for DM      Insulin Pen Needle (BD Pen Needle Tita U/F) 32G X 4 MM MISC Use four times daily as directed with insulin pen  Qty: 200 each, Refills: 3    Associated Diagnoses: Type 2 diabetes mellitus with hyperglycemia, with long-term current use of insulin (HCC) Lancets (OneTouch Delica Plus [id number]) MISC Use 1 each 4 (four) times a day  Qty: 200 each, Refills: 2    Associated Diagnoses: Type 2 diabetes mellitus with hyperglycemia, with long-term current use of insulin ([facility])      loperamide (IMODIUM) 2 mg capsule Take 1 capsule (2 mg total) by mouth 4 (four) times a day as needed for diarrhea (can have up to 8mg per day)  Qty: 30 capsule, Refills: 0    Associated Diagnoses: Diarrhea of infectious origin      losartan (COZAAR) 100 MG tablet Take 1 tablet (100 mg total) by mouth daily  Qty: 30 tablet, Refills: 2    Comments: Requesting 1 year supply  Associated Diagnoses: Primary hypertension      metFORMIN (GLUCOPHAGE) 1000 MG tablet Take 1 tablet (1,000 mg total) by mouth 2 (two) times a day with meals  Qty: 60 tablet, Refills: 2    Associated Diagnoses: Type 2 diabetes mellitus with hyperglycemia, with long-term current use of insulin ([facility])      metoprolol tartrate (LOPRESSOR) 100 mg tablet Take 1 tablet (100 mg total) by mouth every 12 (twelve) hours  Qty: 60 tablet, Refills: 2    Associated Diagnoses: Primary hypertension      multivitamin-iron-minerals-folic acid (THERAPEUTIC-M) TABS tablet Take 1 tablet by mouth daily  Qty: 30 tablet, Refills: 2    Associated Diagnoses: Iron deficiency anemia secondary to inadequate dietary iron intake      Nutritional Supplements (ProSource No Carb) LIQD Take 30 mL by mouth 2 (two) times a day      Petrolatum-Zinc Oxide 57-17 % PSTE Apply 1 application  topically 3 (three) times a day To sacral area - protective      predniSONE 1 mg tablet Take 4 tablets (4 mg total) by mouth daily  Qty: 120 tablet, Refills: 1    Associated Diagnoses: Polymyalgia rheumatica (HCC)      topiramate (TOPAMAX) 100 mg tablet Take 1 tablet (100 mg total) by mouth daily at bedtime  Qty: 30 tablet, Refills: 2    Associated Diagnoses: Seizures ([address] )             No discharge procedures on file      PDMP Review       Value Time User    PDMP Reviewed  Yes "4/12/2023  1:16 AM Moreno Kumar PA-C           ED Provider  Attending physically available and evaluated Nilo Leisure  I managed the patient along with the ED Attending  Electronically Signed by    Portions of the record may have been created with voice recognition software  Occasional wrong word or \"sound a like\" substitutions may have occurred due to the inherent limitations of voice recognition software    Read the chart carefully and recognize, using context, where substitutions have occurred     Dulce Root MD  05/28/23 2126    "

## 2023-05-28 NOTE — ASSESSMENT & PLAN NOTE
Lab Results   Component Value Date    HGBA1C 11 2 (H) 04/19/2023       Recent Labs     05/28/23  1611   POCGLU 252*       Blood Sugar Average: Last 72 hrs:  (P) 252   Evidence of acute infection, we will place routine podiatry consultation

## 2023-05-28 NOTE — H&P
[facility]  H&P  Name: Drew Manley 57 y o  female I MRN: [id number]  Unit/Bed#: ED-15 I Date of Admission: 5/28/2023   Date of Service: 5/28/2023 I Hospital Day: 0    Assessment and Plan  * Homeless  Assessment & Plan  Patient and son are both homeless  Socially complex history - patient became homeless back in February and has been noted to have multiple ED visits/admissions to both hospital and rehab centers  Patient was recently discharged from [facility] on 5/8/2023 and has been homeless since then  Renal insufficiency  Assessment & Plan  Patient with elevated creatinine without any evidence of renal failure  Patient given IV fluid by the ED  We will hold losartan as well as Lasix for now  We will allow renal recovery with plan for resumption of home meds in the next 24 hours  Recent Labs     05/28/23  1653   CREATININE 1 49*         Diabetic foot ulcers (HCC)  Assessment & Plan  Lab Results   Component Value Date    HGBA1C 11 2 (H) 04/19/2023       Recent Labs     05/28/23  1611   POCGLU 252*       Blood Sugar Average: Last 72 hrs:  (P) 252   Evidence of acute infection, we will place routine podiatry consultation    Legally blind  Assessment & Plan  Patient is legally blind from diabetic retinopathy    Ambulatory dysfunction  Assessment & Plan  We will place PT OT consultations for discharge planning      Polymyalgia rheumatica [facility])  Assessment & Plan  Resume home medication  Prior To admission on prednisone    Hypertension  Assessment & Plan  Home regimen: Amlodipine 5mg daily, Losartan 100mg daily, Lopressor 100mg q12hrs, Lasix 20mg BID, Hydralazine 10mg TID  Was not taking her BP meds  due to multiple factors - homelessness, difficulty reading small print on pill bottles  • Continue Lopressor, Amlodopine    • Hold Losartan, Lasix    Type 2 diabetes mellitus with hyperglycemia, with long-term current use of insulin [facility])  Assessment & Plan  Lab Results Component Value Date    HGBA1C 11 2 (H) 04/19/2023       Recent Labs     05/28/23  1611   POCGLU 252*       Blood Sugar Average: Last 72 hrs:  (P) 252   Home regimen reviewed  Hold Metformin if applicable  Start SSI and Basal bolus protocol    Class 3 severe obesity with serious comorbidity and body mass index (BMI) of 50 0 to 59 9 in adult [facility])  Assessment & Plan  Recommend outpatient weight loss when appropriate    Seizures (HCC)  Assessment & Plan  Continue PTA Topamax      Code Status: FULL CODE    VTE Prophylaxis: Heparin  / sequential compression device     POLST: There is no POLST form on file for this patient (pre-hospital)  Discussion with family: Discussed with son at bedside    Anticipated Length of Stay:  Patient will be admitted on an Observation basis with an anticipated length of stay of less than 2 midnights  Justification for Hospital Stay: Homeless     Total Time for Visit, including Counseling / Coordination of Care: 1 hour  Greater than 50% of this total time spent on direct patient counseling and coordination of care  Chief Complaint:     Chief Complaint   Patient presents with   • Fatigue     Pt has been without food for nearly one week  Pt reports drinking some water  Is out of some of her medications that she takes for diabetes, HTN  Son reports they are homeless [place]  Son also notes that pt has had syncopal and near syncopal episodes over the last few days  History of Present Illness:    Harper Morrison is a 57 y o  female who presents with chief complaint of homelessness  The patient has a past medical history of diabetes mellitus on insulin, diabetic retinopathy which resulted in legal blindness, chronic kidney disease, hypertension, hyperlipidemia, polymyalgia rheumatica as well as history of seizures and migraines  The patient's social history is complex    She became homeless back in February and has been noted of multiple ED visits to both the hospital and rehab centers  The patient now represents back to the emergency room with a chief complaint of poor access to food  She reports that she has not been taking her medications as prescribed as she is out of some of her medications  [place]  She is also felt lightheaded and dizzy  She denies any nausea vomiting or diarrhea  Case was discussed with the emergency room  They recommended given social situation and poor p o  intake as well as unreliable short-term follow-up with outpatient labs that the patient be admitted for IV fluid as well as a recheck of kidney function in the morning  The patient herself reports no nausea or vomiting  She states that she was recently on metformin  Review of Systems:    A complete and comprehensive 14 point organ system review was performed and all other systems are negative other than stated above in the HPI    Past Medical and Surgical History:     Past Medical History:   Diagnosis Date   • Anemia    • Arthritis    • Benign hypertension     Procedure/Onset: 01/01/2005; Description: BP elevated today  Pt reports running out of BP meds 2wks ago  Will resume BP meds     • Diabetes mellitus ([facility] [address] )    • Diabetes mellitus type 2 with complications, uncontrolled 9/8/2015   • Dyspnea on exertion    • Hyperlipidemia    • Hypertension    • Legally blind 2010   • Mild intermittent asthma with exacerbation 8/4/2018   • Miscarriage     x 3   • Polymyalgia rheumatica ([facility] [address] ) 11/24/2021   • Seizures ([facility] [address] )    • Sickle cell trait ([facility] [address] )    • Sleep apnea    • Stage 3a chronic kidney disease ([facility] [address] ) 5/19/2022   • Thyroid nodule 3/6/2020       Past Surgical History:   Procedure Laterality Date   • CATARACT EXTRACTION Bilateral     august and september   • EYE SURGERY     • HYSTERECTOMY      37   • OOPHORECTOMY Left     37   • MI LIGATION/BIOPSY TEMPORAL ARTERY Bilateral 10/17/2019    Procedure: BIOPSY ARTERY TEMPORAL;  Surgeon: Philippe Reyes DO;  Location: [facility];  Service: Vascular   • US GUIDED THYROID BIOPSY  5/13/2020       Meds/Allergies:    Prior to Admission medications    Medication Sig Start Date End Date Taking? Authorizing Provider   acetaminophen (TYLENOL) 325 mg tablet Take 650 mg by mouth every 6 (six) hours as needed for mild pain    Historical Provider, MD   amLODIPine (NORVASC) 5 mg tablet Take 1 tablet (5 mg total) by mouth daily 5/22/23   CONCETTA Adams   aspirin (ECOTRIN LOW STRENGTH) 81 mg EC tablet Take 1 tablet (81 mg total) by mouth daily 5/22/23   CONCETTA Adams   atorvastatin (LIPITOR) 40 mg tablet Take 1 tablet (40 mg total) by mouth daily 5/22/23   CONCETTA Adams   Blood Glucose Monitoring Suppl (OneTouch Verio Reflect) w/Device KIT Use 1 each 4 (four) times a day 5/22/23   CONCETTA Adams   Blood Pressure Monitor KIT Check blood pressures BID 2/17/23   Liliane Phalen, PA-C   budesonide-formoterol (Symbicort) 160-4 5 mcg/act inhaler Inhale 1 puff 2 (two) times a day Rinse mouth after use  5/22/23   CONCETTA Adams   cholecalciferol (VITAMIN D3) 1,000 units tablet Take 2 tablets (2,000 Units total) by mouth daily 5/22/23   CONCETTA Adams   Continuous Blood Gluc  (FreeStyle [address] [address] [place]) ERIC Use 1 each 4 (four) times a day 2/17/23   Liliane Phalen, PA-C   Continuous Blood Gluc Sensor (FreeStyle Batres 2 Sensor) MISC Use 1 each every 14 (fourteen) days 2/17/23   Liliane Phalen, PA-C   docusate sodium (COLACE) 100 mg capsule Take 1 capsule (100 mg total) by mouth 2 (two) times a day 4/7/23 5/7/23  CONCETTA Fang   Emollient (eucerin) lotion Apply 1 application   topically 2 (two) times a day = to B/L LE 4/7/23 5/7/23  CONCETTA Fang   furosemide (LASIX) 20 mg tablet Take 1 tablet (20 mg total) by mouth 2 (two) times a day 5/22/23   CONCETTA Adams   gabapentin (NEURONTIN) 100 mg capsule Take 1 capsule (100 mg total) by mouth 2 (two) times a day 5/22/23   CONCETTA Adams   glucose blood (OneTouch Verio) test strip Check blood sugars four times daily  Please substitute with appropriate alternative as covered by patient's insurance  Dx: E11 65 5/22/23   CONCETTA Adams   hydrALAZINE (APRESOLINE) 10 mg tablet Take 1 tablet (10 mg total) by mouth 3 (three) times a day 5/22/23   CONCETTA Adams   Insulin Glargine Solostar 100 UNIT/ML SOPN Inject 0 27 mL (27 Units total) under the skin in the morning AND 0 24 mL (24 Units total) daily at bedtime  = Lantus 27 units at 9AM/ Lantus 24 units @ 9PM)  5/22/23   CONCETTA Adams   insulin lispro (HumaLOG) 100 units/mL injection pen Inject 5 Units under the skin 3 (three) times a day with meals = also have SSI for  and higher 5/22/23   CONCETTA Adams   insulin lispro (HumaLOG) 100 units/mL injection Inject under the skin Sliding scale   Inject as per sliding scale:  if 0 - 150 = 0 unit;  151 - 200 = 2 units;  201 - 250 = 4 units;  251 - 300 = 6 units;  301 - 350 = 8 units;  351 - 400 = 10 units less than 70 or above 400 notify MD,  subcutaneously before meals and at bedtime for DM    Historical Provider, MD   Insulin Pen Needle (BD Pen Needle Tita U/F) 32G X 4 MM MISC Use four times daily as directed with insulin pen 5/22/23   CONCETTA Adams   Lancets (OneTouch Delica Plus [id number]) MISC Use 1 each 4 (four) times a day 5/22/23   CONCETTA Adams   loperamide (IMODIUM) 2 mg capsule Take 1 capsule (2 mg total) by mouth 4 (four) times a day as needed for diarrhea (can have up to 8mg per day) 3/8/23   Melany Almendarez DO   losartan (COZAAR) 100 MG tablet Take 1 tablet (100 mg total) by mouth daily 5/22/23   CONCETTA Adams   metFORMIN (GLUCOPHAGE) 1000 MG tablet Take 1 tablet (1,000 mg total) by mouth 2 (two) times a day with meals 5/22/23   CONCETTA Adams   metoprolol tartrate (LOPRESSOR) 100 mg tablet Take 1 tablet (100 mg total) by mouth every 12 (twelve) hours 5/22/23   CONCETTA Barillas   multivitamin-iron-minerals-folic acid [facility]) TABS tablet Take 1 tablet by mouth daily 5/22/23   CONCETTA Barillas   Nutritional Supplements (ProSource No Carb) LIQD Take 30 mL by mouth 2 (two) times a day    Historical Provider, MD   Petrolatum-Zinc Oxide 57-17 % PSTE Apply 1 application  topically 3 (three) times a day To sacral area - protective    Historical Provider, MD   predniSONE 1 mg tablet Take 4 tablets (4 mg total) by mouth daily 5/22/23   CONCETTA Barillas   topiramate (TOPAMAX) 100 mg tablet Take 1 tablet (100 mg total) by mouth daily at bedtime 5/22/23   CONCETTA Barillas     I have reviewed home medications with patient personally      Allergies: No Known Allergies    Social History:     Marital Status: /Civil Union   Occupation: Homeless, unknown    Substance Use History:   Social History     Substance and Sexual Activity   Alcohol Use Never   • Alcohol/week: 0 0 standard drinks of alcohol     Social History     Tobacco Use   Smoking Status Never   Smokeless Tobacco Never     Social History     Substance and Sexual Activity   Drug Use No       Family History:    Family History   Problem Relation Age of Onset   • Heart attack Mother    • Heart disease Mother    • Hypertension Mother    • No Known Problems Father    • Heart disease Sister    • No Known Problems Brother    • No Known Problems Son    • No Known Problems Daughter    • Heart attack Maternal Grandmother    • Heart disease Maternal Grandmother    • Diabetes Other    • Heart attack Other    • No Known Problems Sister    • No Known Problems Sister    • No Known Problems Paternal Aunt    • No Known Problems Son    • Cancer Neg Hx    • Stroke Neg Hx        Physical Exam:     Vitals:   Blood Pressure: 127/65 (05/28/23 1610)  Pulse: 93 (05/28/23 1610)  Temperature: 98 1 °F (36 7 °C) (05/28/23 1619)  Temp Source: Oral (05/28/23 1619)  Respirations: 19 (05/28/23 1610)  SpO2: 100 % (05/28/23 1610)      General: ill appearing, no acute distress  HEENT: atraumatic, PERRLA, dry oral mucosa, normal pharynx, normal tonsils and adenoids, normal tongue, no fluid in sinuses  Neck: Trachea midline, no carotid bruit, no masses  Respiratory: normal chest wall expansion, CTA B, no r/r/w, no rubs  Cardiovascular: RRR, no m/r/g, Normal S1 and S2  Abdomen: Soft, non-tender, non-distended, normal bowel sounds in all quadrants, no hepatosplenomegaly, no tympany  Rectal: deferred  Musculoskeletal: Moves all  Integumentary: warm, dry, and pink, with no rash, purpura, or petechia  Heme/Lymph: no lymphadenopathy, no bruises  Neurological: Cranial Nerves II-XII grossly intact  Psychiatric: cooperative with normal mood, affect, and cognition    Additional Data:     Lab Results: I have personally reviewed pertinent reports  Results from last 7 days   Lab Units 05/28/23  1653   EOS PCT % 2   HEMATOCRIT % 34 5*   HEMOGLOBIN g/dL 10 5*   LYMPHS PCT % 17   MONOS PCT % 7   NEUTROS PCT % 72   PLATELETS Thousands/uL 326   WBC Thousand/uL 9 32     Results from last 7 days   Lab Units 05/28/23  1653   ANION GAP mmol/L 9   ALBUMIN g/dL 3 4*   ALK PHOS U/L 123*   ALT U/L 24   AST U/L 16   BUN mg/dL 19   CALCIUM mg/dL 9 2   CHLORIDE mmol/L 104   CO2 mmol/L 23   CREATININE mg/dL 1 49*   GLUCOSE RANDOM mg/dL 263*   POTASSIUM mmol/L 3 8   SODIUM mmol/L 136   TOTAL BILIRUBIN mg/dL 0 56         Results from last 7 days   Lab Units 05/28/23  1611   POC GLUCOSE mg/dl 252*             Results from last 7 days   Lab Units 05/28/23  1653   HS TNI 0HR ng/L <2       Imaging: I have personally reviewed pertinent reports  XR chest 2 views   ED Interpretation by DO Craig (05/28 1735)   Interpreted by me as no pneumonia            EKG, Pathology, and Other Studies Reviewed on Admission:   · Previous discharge summary from 5/8/2023    [employer] / [place] Records Reviewed: Yes     ** Please Note: This note was completed in part utilizing M-Modal Fluency Direct Software  Grammatical errors, random word insertions, spelling mistakes, and incomplete sentences may be an occasional consequence of this system secondary to software limitations, ambient noise, and hardware issues  If you have any questions or concerns about the content, text, or information contained within the body of this dictation, please contact the provider for clarification  **  \

## 2023-05-28 NOTE — ASSESSMENT & PLAN NOTE
Home regimen: Amlodipine 5mg daily, Losartan 100mg daily, Lopressor 100mg q12hrs, Lasix 20mg BID, Hydralazine 10mg TID  Was not taking her BP meds  due to multiple factors - homelessness, difficulty reading small print on pill bottles  • Continue Lopressor, Amlodopine    • Hold Losartan, Lasix

## 2023-05-28 NOTE — ASSESSMENT & PLAN NOTE
Lab Results   Component Value Date    HGBA1C 11 2 (H) 04/19/2023       Recent Labs     05/28/23  1611   POCGLU 252*       Blood Sugar Average: Last 72 hrs:  (P) 252   Home regimen reviewed  Hold Metformin if applicable    Start SSI and Basal bolus protocol

## 2023-05-29 PROBLEM — L97.429 ULCER OF LEFT HEEL (HCC): Status: ACTIVE | Noted: 2023-05-29

## 2023-05-29 PROBLEM — E11.9 DIABETES MELLITUS (HCC): Status: ACTIVE | Noted: 2023-04-12

## 2023-05-29 LAB
ANION GAP SERPL CALCULATED.3IONS-SCNC: 6 MMOL/L (ref 4–13)
BUN SERPL-MCNC: 19 MG/DL (ref 5–25)
CALCIUM SERPL-MCNC: 8.8 MG/DL (ref 8.4–10.2)
CHLORIDE SERPL-SCNC: 108 MMOL/L (ref 96–108)
CO2 SERPL-SCNC: 24 MMOL/L (ref 21–32)
CREAT SERPL-MCNC: 1.26 MG/DL (ref 0.6–1.3)
ERYTHROCYTE [DISTWIDTH] IN BLOOD BY AUTOMATED COUNT: 19.1 % (ref 11.6–15.1)
GFR SERPL CREATININE-BSD FRML MDRD: 45 ML/MIN/1.73SQ M
GLUCOSE SERPL-MCNC: 187 MG/DL (ref 65–140)
GLUCOSE SERPL-MCNC: 238 MG/DL (ref 65–140)
GLUCOSE SERPL-MCNC: 243 MG/DL (ref 65–140)
GLUCOSE SERPL-MCNC: 244 MG/DL (ref 65–140)
GLUCOSE SERPL-MCNC: 282 MG/DL (ref 65–140)
HCT VFR BLD AUTO: 30.9 % (ref 34.8–46.1)
HGB BLD-MCNC: 9.4 G/DL (ref 11.5–15.4)
MCH RBC QN AUTO: 23.5 PG (ref 26.8–34.3)
MCHC RBC AUTO-ENTMCNC: 30.4 G/DL (ref 31.4–37.4)
MCV RBC AUTO: 77 FL (ref 82–98)
PLATELET # BLD AUTO: 284 THOUSANDS/UL (ref 149–390)
PMV BLD AUTO: 10.4 FL (ref 8.9–12.7)
POTASSIUM SERPL-SCNC: 3.3 MMOL/L (ref 3.5–5.3)
RBC # BLD AUTO: 4 MILLION/UL (ref 3.81–5.12)
SODIUM SERPL-SCNC: 138 MMOL/L (ref 135–147)
WBC # BLD AUTO: 10.65 THOUSAND/UL (ref 4.31–10.16)

## 2023-05-29 PROCEDURE — 11042 DBRDMT SUBQ TIS 1ST 20SQCM/<: CPT | Performed by: PODIATRIST

## 2023-05-29 PROCEDURE — 0JBR0ZZ EXCISION OF LEFT FOOT SUBCUTANEOUS TISSUE AND FASCIA, OPEN APPROACH: ICD-10-PCS | Performed by: PODIATRIST

## 2023-05-29 PROCEDURE — 85027 COMPLETE CBC AUTOMATED: CPT | Performed by: INTERNAL MEDICINE

## 2023-05-29 PROCEDURE — 99222 1ST HOSP IP/OBS MODERATE 55: CPT | Performed by: PODIATRIST

## 2023-05-29 PROCEDURE — 99232 SBSQ HOSP IP/OBS MODERATE 35: CPT | Performed by: INTERNAL MEDICINE

## 2023-05-29 PROCEDURE — 90677 PCV20 VACCINE IM: CPT | Performed by: INTERNAL MEDICINE

## 2023-05-29 PROCEDURE — 82948 REAGENT STRIP/BLOOD GLUCOSE: CPT

## 2023-05-29 PROCEDURE — 80048 BASIC METABOLIC PNL TOTAL CA: CPT | Performed by: INTERNAL MEDICINE

## 2023-05-29 RX ORDER — INSULIN LISPRO 100 [IU]/ML
1-5 INJECTION, SOLUTION INTRAVENOUS; SUBCUTANEOUS
Status: DISCONTINUED | OUTPATIENT
Start: 2023-05-29 | End: 2023-05-30 | Stop reason: HOSPADM

## 2023-05-29 RX ADMIN — ATORVASTATIN CALCIUM 40 MG: 40 TABLET, FILM COATED ORAL at 08:38

## 2023-05-29 RX ADMIN — HYDRALAZINE HYDROCHLORIDE 10 MG: 10 TABLET, FILM COATED ORAL at 17:19

## 2023-05-29 RX ADMIN — TOPIRAMATE 100 MG: 100 TABLET, FILM COATED ORAL at 21:11

## 2023-05-29 RX ADMIN — DOCUSATE SODIUM 100 MG: 100 CAPSULE, LIQUID FILLED ORAL at 17:19

## 2023-05-29 RX ADMIN — PREDNISONE 4 MG: 1 TABLET ORAL at 08:38

## 2023-05-29 RX ADMIN — PNEUMOCOCCAL 20-VALENT CONJUGATE VACCINE 0.5 ML
2.2; 2.2; 2.2; 2.2; 2.2; 2.2; 2.2; 2.2; 2.2; 2.2; 2.2; 2.2; 2.2; 2.2; 2.2; 2.2; 4.4; 2.2; 2.2; 2.2 INJECTION, SUSPENSION INTRAMUSCULAR at 05:26

## 2023-05-29 RX ADMIN — INSULIN LISPRO 2 UNITS: 100 INJECTION, SOLUTION INTRAVENOUS; SUBCUTANEOUS at 17:19

## 2023-05-29 RX ADMIN — GABAPENTIN 100 MG: 100 CAPSULE ORAL at 17:19

## 2023-05-29 RX ADMIN — Medication 2000 UNITS: at 08:44

## 2023-05-29 RX ADMIN — AMLODIPINE BESYLATE 5 MG: 5 TABLET ORAL at 08:38

## 2023-05-29 RX ADMIN — BUDESONIDE AND FORMOTEROL FUMARATE DIHYDRATE 1 PUFF: 160; 4.5 AEROSOL RESPIRATORY (INHALATION) at 08:44

## 2023-05-29 RX ADMIN — HYDRALAZINE HYDROCHLORIDE 10 MG: 10 TABLET, FILM COATED ORAL at 21:11

## 2023-05-29 RX ADMIN — INSULIN GLARGINE 25 UNITS: 100 INJECTION, SOLUTION SUBCUTANEOUS at 21:11

## 2023-05-29 RX ADMIN — INSULIN LISPRO 5 UNITS: 100 INJECTION, SOLUTION INTRAVENOUS; SUBCUTANEOUS at 11:50

## 2023-05-29 RX ADMIN — METOPROLOL TARTRATE 100 MG: 100 TABLET, FILM COATED ORAL at 08:38

## 2023-05-29 RX ADMIN — INSULIN LISPRO 5 UNITS: 100 INJECTION, SOLUTION INTRAVENOUS; SUBCUTANEOUS at 08:38

## 2023-05-29 RX ADMIN — INSULIN GLARGINE 25 UNITS: 100 INJECTION, SOLUTION SUBCUTANEOUS at 08:38

## 2023-05-29 RX ADMIN — DOCUSATE SODIUM 100 MG: 100 CAPSULE, LIQUID FILLED ORAL at 08:38

## 2023-05-29 RX ADMIN — HYDRALAZINE HYDROCHLORIDE 10 MG: 10 TABLET, FILM COATED ORAL at 08:38

## 2023-05-29 RX ADMIN — BUDESONIDE AND FORMOTEROL FUMARATE DIHYDRATE 1 PUFF: 160; 4.5 AEROSOL RESPIRATORY (INHALATION) at 17:19

## 2023-05-29 RX ADMIN — ASPIRIN 81 MG: 81 TABLET, COATED ORAL at 08:38

## 2023-05-29 RX ADMIN — GABAPENTIN 100 MG: 100 CAPSULE ORAL at 08:38

## 2023-05-29 RX ADMIN — HEPARIN SODIUM 5000 UNITS: 5000 INJECTION INTRAVENOUS; SUBCUTANEOUS at 21:11

## 2023-05-29 RX ADMIN — HEPARIN SODIUM 5000 UNITS: 5000 INJECTION INTRAVENOUS; SUBCUTANEOUS at 14:37

## 2023-05-29 RX ADMIN — HEPARIN SODIUM 5000 UNITS: 5000 INJECTION INTRAVENOUS; SUBCUTANEOUS at 05:29

## 2023-05-29 RX ADMIN — INSULIN LISPRO 5 UNITS: 100 INJECTION, SOLUTION INTRAVENOUS; SUBCUTANEOUS at 17:20

## 2023-05-29 RX ADMIN — METOPROLOL TARTRATE 100 MG: 100 TABLET, FILM COATED ORAL at 21:11

## 2023-05-29 NOTE — PLAN OF CARE
Problem: METABOLIC, FLUID AND ELECTROLYTES - ADULT  Goal: Glucose maintained within target range  Description: INTERVENTIONS:  - Monitor Blood Glucose as ordered  - Assess for signs and symptoms of hyperglycemia and hypoglycemia  - Administer ordered medications to maintain glucose within target range  - Assess nutritional intake and initiate nutrition service referral as needed  Outcome: Progressing     Problem: SKIN/TISSUE INTEGRITY - ADULT  Goal: Skin Integrity remains intact(Skin Breakdown Prevention)  Description: Assess:  -Perform Walker assessment every shift  -Clean and moisturize skin every day  -Inspect skin when repositioning, toileting, and assisting with ADLS  -Assess extremities for adequate circulation and sensation     Bed Management:  -Have minimal linens on bed & keep smooth, unwrinkled  -Change linens as needed when moist or perspiring      Toileting:  -Offer bedside commode  -Use incontinent care products after each incontinent episode    Activity:  -Encourage activity and walks on unit  -Encourage or provide ROM exercises   -Turn and reposition patient every 2 Hours  -Use appropriate equipment to lift or move patient in bed  -Instruct/ Assist with weight shifting when out of bed in chair  -Consider limitation of chair time    Skin Care:  -Avoid use of baby powder, tape, friction and shearing, hot water or constrictive clothing  -Relieve pressure over bony prominences   -Do not massage red bony areas      Outcome: Progressing  Goal: Incision(s), wounds(s) or drain site(s) healing without S/S of infection  Description: INTERVENTIONS  - Assess and document dressing, incision, wound bed, drain sites and surrounding tissue  - Provide patient and family education  - Perform skin care/dressing changes every shift  Outcome: Progressing     Problem: MUSCULOSKELETAL - ADULT  Goal: Maintain or return mobility to safest level of function  Description: INTERVENTIONS:  - Assess patient's ability to carry out ADLs; assess patient's baseline for ADL function and identify physical deficits which impact ability to perform ADLs (bathing, care of mouth/teeth, toileting, grooming, dressing, etc )  - Assess/evaluate cause of self-care deficits   - Assess range of motion  - Assess patient's mobility  - Assess patient's need for assistive devices and provide as appropriate  - Encourage maximum independence but intervene and supervise when necessary  - Involve family in performance of ADLs  - Assess for home care needs following discharge   - Consider OT consult to assist with ADL evaluation and planning for discharge  - Provide patient education as appropriate  Outcome: Progressing  Goal: Maintain proper alignment of affected body part  Description: INTERVENTIONS:  - Support, maintain and protect limb and body alignment  - Provide patient/ family with appropriate education  Outcome: Progressing     Problem: PAIN - ADULT  Goal: Verbalizes/displays adequate comfort level or baseline comfort level  Description: Interventions:  - Encourage patient to monitor pain and request assistance  - Assess pain using appropriate pain scale  - Administer analgesics based on type and severity of pain and evaluate response  - Implement non-pharmacological measures as appropriate and evaluate response  - Consider cultural and social influences on pain and pain management  - Notify physician/advanced practitioner if interventions unsuccessful or patient reports new pain  Outcome: Progressing     Problem: SAFETY ADULT  Goal: Patient will remain free of falls  Description: INTERVENTIONS:  - Educate patient/family on patient safety including physical limitations  - Instruct patient to call for assistance with activity   - Consult OT/PT to assist with strengthening/mobility   - Keep Call bell within reach  - Keep bed low and locked with side rails adjusted as appropriate  - Keep care items and personal belongings within reach  - Initiate and maintain comfort rounds  - Make Fall Risk Sign visible to staff  - Apply yellow socks and bracelet for high fall risk patients  - Consider moving patient to room near nurses station  Outcome: Progressing  Goal: Maintain or return to baseline ADL function  Description: INTERVENTIONS:  -  Assess patient's ability to carry out ADLs; assess patient's baseline for ADL function and identify physical deficits which impact ability to perform ADLs (bathing, care of mouth/teeth, toileting, grooming, dressing, etc )  - Assess/evaluate cause of self-care deficits   - Assess range of motion  - Assess patient's mobility; develop plan if impaired  - Assess patient's need for assistive devices and provide as appropriate  - Encourage maximum independence but intervene and supervise when necessary  - Involve family in performance of ADLs  - Assess for home care needs following discharge   - Consider OT consult to assist with ADL evaluation and planning for discharge  - Provide patient education as appropriate  Outcome: Progressing  Goal: Maintains/Returns to pre admission functional level  Description: INTERVENTIONS:  - Perform BMAT or MOVE assessment daily    - Set and communicate daily mobility goal to care team and patient/family/caregiver     - Collaborate with rehabilitation services on mobility goals if consulted  - Out of bed for toileting  - Record patient progress and toleration of activity level   Outcome: Progressing     Problem: DISCHARGE PLANNING  Goal: Discharge to home or other facility with appropriate resources  Description: INTERVENTIONS:  - Identify barriers to discharge w/patient and caregiver  - Arrange for needed discharge resources and transportation as appropriate  - Identify discharge learning needs (meds, wound care, etc )  - Arrange for interpretive services to assist at discharge as needed  - Refer to Case Management Department for coordinating discharge planning if the patient needs post-hospital services based on physician/advanced practitioner order or complex needs related to functional status, cognitive ability, or social support system  Outcome: Progressing     Problem: Knowledge Deficit  Goal: Patient/family/caregiver demonstrates understanding of disease process, treatment plan, medications, and discharge instructions  Description: Complete learning assessment and assess knowledge base    Interventions:  - Provide teaching at level of understanding  - Provide teaching via preferred learning methods  Outcome: Progressing

## 2023-05-29 NOTE — ASSESSMENT & PLAN NOTE
Home regimen: Amlodipine 5mg daily, Losartan 100mg daily, Lopressor 100mg q12hrs, Lasix 20mg BID, Hydralazine 10mg TID  Was not taking her BP meds  due to multiple factors - homelessness, difficulty reading small print on pill bottles  • Continue Lopressor, Amlodopine    • Losartan, Lasix are held due to to elevated creatinine, blood pressure controlled off of these for now, will reintroduce as blood pressure elevates

## 2023-05-29 NOTE — CONSULTS
[address] [facility] - Consultation    Patient Information:   Syeda Higginbotham 57 y o  female MRN: [id number]  Unit/Bed#: E5 -01 Encounter: [id number]  PCP: Tanisha Quiroz MD  Date of Admission:  5/28/2023  Date of Consultation: 05/29/23  Requesting Physician: Reji Milian MD      ASSESSMENT:    Syeda Higginbotham is a 57 y o  female with:    1  L diabetic heel ulcer  2  T2DM w/peripheral neuropathy  3  CKD3    PLAN:    · L diabetic heel ulcer debrided at bedside  Wound is stable without clinical sign of acute infection  Wound appears to be making positive progress since prior admissions  No surgical plans or further workup  · Local wound care consisting of dermagran, DSD  Wound care orders placed  · Rest of care per primary team   · Will discuss this plan with my attending and update as needed  Weight Bearing Status: Weight bearing as tolerated wearing heel offloading shoe on the left    Wound debridement note: After verbal consent was obtained, wound located at left plantar heel was excisionally debrided with a 10 blade of all nonviable, devitalized, fibrotic tissue w/ excision of subcutaneous tissue to depth of subcutaneous tissue  Post debridement wound measurements 2x1 5x0 2cm, with appearance of wound granular fresh bleeding tissue with viable 100% vs nonviable 0%, <4sq cm debrided  SUBJECTIVE    History of Present Illness:    Syeda Higginbotham is a 57 y o  female with past medical history including HTN, T2DM, CKD3, polymyalgia rheumatica, ambulatory dysfunction, legally blind secondary to diabetic retinopathy, and seizures  She is also homeless  She presented to the ED and was admitted after going without food and feeling lightheaded and dizzy  She has also been going without some of her medications  Podiatry is consulted for her left heel wound  She is known to the podiatry service from recent inpatient consults for the wound 4/13/23 and 5/9/23   She reports she has not noticed any significant "changes to the wound since she left the hospital  She says she has been on her feet quite a bit and understands she may have been \"over doing it\" as far as trying to stay off the foot as much as possible to let the wound heal     Review of Systems:    Constitutional: Negative  HENT: Negative  Eyes: Negative  Respiratory: Negative  Cardiovascular: Negative  Gastrointestinal: Negative  Musculoskeletal: Negative  Skin: L heel wound   Neurological: Peripheral neuropathy B/L LE   Psych: Negative  Past Medical and Surgical History:     Past Medical History:   Diagnosis Date   • Anemia    • Arthritis    • Benign hypertension     Procedure/Onset: 01/01/2005; Description: BP elevated today  Pt reports running out of BP meds 2wks ago  Will resume BP meds     • Diabetes mellitus ([facility] [address] )    • Diabetes mellitus type 2 with complications, uncontrolled 9/8/2015   • Dyspnea on exertion    • Hyperlipidemia    • Hypertension    • Legally blind 2010   • Mild intermittent asthma with exacerbation 8/4/2018   • Miscarriage     x 3   • Polymyalgia rheumatica ([facility] [address] ) 11/24/2021   • Seizures ([facility] [address] )    • Sickle cell trait ([facility] [address] )    • Sleep apnea    • Stage 3a chronic kidney disease ([facility] [address] ) 5/19/2022   • Thyroid nodule 3/6/2020       Past Surgical History:   Procedure Laterality Date   • CATARACT EXTRACTION Bilateral     august and september   • EYE SURGERY     • HYSTERECTOMY      37   • OOPHORECTOMY Left     37   • MN LIGATION/BIOPSY TEMPORAL ARTERY Bilateral 10/17/2019    Procedure: BIOPSY ARTERY TEMPORAL;  Surgeon: Wilmar Lewis DO;  Location: [facility];  Service: Vascular   • US GUIDED THYROID BIOPSY  5/13/2020       Meds/Allergies:    Medications Prior to Admission   Medication   • acetaminophen (TYLENOL) 325 mg tablet   • amLODIPine (NORVASC) 5 mg tablet   • aspirin (ECOTRIN LOW STRENGTH) 81 mg EC tablet   • atorvastatin (LIPITOR) 40 mg tablet   • Blood Glucose Monitoring Suppl (OneTouch Verio Reflect) w/Device KIT " • Blood Pressure Monitor KIT   • budesonide-formoterol (Symbicort) 160-4 5 mcg/act inhaler   • cholecalciferol (VITAMIN D3) 1,000 units tablet   • Continuous Blood Gluc  (FreeStyle Denisa 2 O'Brien) ERIC   • Continuous Blood Gluc Sensor (FreeStyle Denisa 2 Sensor) MISC   • docusate sodium (COLACE) 100 mg capsule   • Emollient (eucerin) lotion   • furosemide (LASIX) 20 mg tablet   • gabapentin (NEURONTIN) 100 mg capsule   • glucose blood (OneTouch Verio) test strip   • hydrALAZINE (APRESOLINE) 10 mg tablet   • Insulin Glargine Solostar 100 UNIT/ML SOPN   • insulin lispro (HumaLOG) 100 units/mL injection pen   • insulin lispro (HumaLOG) 100 units/mL injection   • Insulin Pen Needle (BD Pen Needle Tita U/F) 32G X 4 MM MISC   • Lancets (OneTouch Delica Plus [id number]) MISC   • loperamide (IMODIUM) 2 mg capsule   • losartan (COZAAR) 100 MG tablet   • metFORMIN (GLUCOPHAGE) 1000 MG tablet   • metoprolol tartrate (LOPRESSOR) 100 mg tablet   • multivitamin-iron-minerals-folic acid (THERAPEUTIC-M) TABS tablet   • Nutritional Supplements (ProSource No Carb) LIQD   • Petrolatum-Zinc Oxide 57-17 % PSTE   • predniSONE 1 mg tablet   • topiramate (TOPAMAX) 100 mg tablet       No Known Allergies    Social History:     Marital Status: /Civil Union    Substance Use History:   Social History     Substance and Sexual Activity   Alcohol Use Never   • Alcohol/week: 0 0 standard drinks of alcohol     Social History     Tobacco Use   Smoking Status Never   Smokeless Tobacco Never     Social History     Substance and Sexual Activity   Drug Use No       Family History:    Family History   Problem Relation Age of Onset   • Heart attack Mother    • Heart disease Mother    • Hypertension Mother    • No Known Problems Father    • Heart disease Sister    • No Known Problems Brother    • No Known Problems Son    • No Known Problems Daughter    • Heart attack Maternal Grandmother    • Heart disease Maternal Grandmother    • Diabetes Other    • Heart attack Other    • No Known Problems Sister    • No Known Problems Sister    • No Known Problems Paternal Aunt    • No Known Problems Son    • Cancer Neg Hx    • Stroke Neg Hx          OBJECTIVE:    Vitals:   Blood Pressure: 122/56 (05/29/23 0706)  Pulse: 78 (05/29/23 0706)  Temperature: 98 9 °F (37 2 °C) (05/28/23 2304)  Temp Source: Oral (05/28/23 2304)  Respirations: 18 (05/28/23 2304)  Weight - Scale: (!) 138 kg (303 lb 12 7 oz) (05/29/23 0541)  SpO2: 96 % (05/29/23 0706)    Physical Exam:     General Appearance: Alert, cooperative, no distress  HEENT: Head normocephalic, atraumatic, without obvious abnormality  Heart: Normal rate and rhythm  Lungs: Non-labored breathing  No respiratory distress  Abdomen: Without distension  Psychiatric: AAOx3  Lower Extremity:  Vascular:    DP & PT pulses non palpable B/L  Capillary refill time <3 seconds B/L  Skin temperature WNL B/L  No sign of ischemia  Musculoskeletal:  MMT is 5/5 in all muscle compartments bilaterally  Ankle ROM WNL B/L  No gross deformity noted B/L  Dermatological:  No open lesions noted to the right foot  There is a small area of bruising on the plantar lateral midfoot, consistent with pressure/shear when walking  LE Wound Exam:   Wound #: 1  Location: L plantar heel  Length 2cm: Width 1 5cm: Depth 0 2cm:   Deepest Tissue Noted in Base: subcutaneous  Probe to Bone: No  Peripheral Skin Description: Attached  Granulation: 90% Fibrotic Tissue: 10% Necrotic Tissue: 0%   Drainage Amount: minimal, bloody  Signs of Infection: No      Neurological:  Gross sensation is intact  Protective sensation is diminished  Patient Reports numbness and/or paresthesias      Clinical Images 05/29/23:    L heel pre debridement      L heel post debridement      R foot, minor bruising      Additional Data:     Lab Results: I have personally reviewed pertinent labs including:    Results from last 7 days   Lab Units 05/29/23  0442 05/28/23  7046   EOS "PCT %  --  2   HEMATOCRIT % 30 9* 34 5*   HEMOGLOBIN g/dL 9 4* 10 5*   LYMPHS PCT %  --  17   MONOS PCT %  --  7   NEUTROS PCT %  --  72   PLATELETS Thousands/uL 284 326   WBC Thousand/uL 10 65* 9 32     Results from last 7 days   Lab Units 05/29/23  0442 05/28/23  1653   ALK PHOS U/L  --  123*   ALT U/L  --  24   AST U/L  --  16   BUN mg/dL 19 19   CALCIUM mg/dL 8 8 9 2   CHLORIDE mmol/L 108 104   CO2 mmol/L 24 23   CREATININE mg/dL 1 26 1 49*   POTASSIUM mmol/L 3 3* 3 8           Cultures: I have personally reviewed pertinent cultures including:              Imaging: I have personally reviewed pertinent films in PACS  EKG, Pathology, and Other Studies: I have personally reviewed pertinent reports  ** Please Note: Portions of the record may have been created with voice recognition software  Occasional wrong word or \"sound a like\" substitutions may have occurred due to the inherent limitations of voice recognition software  Read the chart carefully and recognize, using context, where substitutions have occurred   **    "

## 2023-05-29 NOTE — PROGRESS NOTES
[address] [place] [address]  Progress Note  Name: Niko Kaminski  MRN: [id number]  Unit/Bed#: E5 -01 I Date of Admission: 5/28/2023   Date of Service: 5/29/2023  Hospital Day: 0    Assessment/Plan   * Homeless  Assessment & Plan  Patient and son are both homeless  Socially complex history - patient became homeless back in February and has been noted to have multiple ED visits/admissions to both hospital and rehab centers  Patient was recently discharged from [facility] on 5/8/2023 and has been homeless since then  Ulcer of left heel ([address] )  Assessment & Plan  · Left diabetic heel ulcer  · Podiatry input appreciated  · Debrided at bedside, wound stable without signs of any acute infection  · Local wound care    Renal insufficiency  Assessment & Plan  · Patient with elevated creatinine on admission  · Improved with IV fluids  · Losartan and furosemide were held, will monitor creatinine and blood pressure and resume appropriately      Diabetes mellitus [facility])  Assessment & Plan  Lab Results   Component Value Date    HGBA1C 11 2 (H) 04/19/2023       Recent Labs     05/28/23  1611 05/28/23  2115 05/29/23  0705   POCGLU 252* 196* 238*       · Hold metformin  · Continue Lantus 25 units twice daily, insulin lispro 5 units 3 times daily with meals  · Monitor Accu-Cheks, sliding scale for coverage    Legally blind  Assessment & Plan  · Patient is legally blind from diabetic retinopathy    Ambulatory dysfunction  Assessment & Plan  PT/OT eval    Polymyalgia rheumatica (HCC)  Assessment & Plan  · Pain on prednisone 4 mg daily    Hypertension  Assessment & Plan  Home regimen: Amlodipine 5mg daily, Losartan 100mg daily, Lopressor 100mg q12hrs, Lasix 20mg BID, Hydralazine 10mg TID  Was not taking her BP meds  due to multiple factors - homelessness, difficulty reading small print on pill bottles  • Continue Lopressor, Amlodopine    • Losartan, Lasix are held due to to elevated creatinine, blood pressure controlled off of these for now, will reintroduce as blood pressure elevates    Class 3 severe obesity with serious comorbidity and body mass index (BMI) of 50 0 to 59 9 in adult [facility])  Assessment & Plan  Recommend outpatient weight loss when appropriate    Seizures (HCC)  Assessment & Plan  · Continue PTA Topamax             VTE Pharmacologic Prophylaxis:   Pharmacologic: Heparin    Patient Centered Rounds: I have performed bedside rounds with nursing staff today  Education and Discussions with Family / Patient: Patient    Time Spent for Care: More than 50% of total time spent on counseling and coordination of care as described above  Current Length of Stay: 0 day(s)    Current Patient Status: Observation   Certification Statement: The patient will continue to require additional inpatient hospital stay due to Elevated creatinine, homeless    Discharge Plan / Estimated Discharge Date: 24h    Code Status: Level 1 - Full Code      Subjective:   Patient seen and examined at bedside, comfortable, denies any complaints    Objective:     Vitals:   Temp (24hrs), Av 4 °F (36 9 °C), Min:98 1 °F (36 7 °C), Max:98 9 °F (37 2 °C)    Temp:  [98 1 °F (36 7 °C)-98 9 °F (37 2 °C)] 98 9 °F (37 2 °C)  HR:  [] 78  Resp:  [18-20] 18  BP: (122-160)/(56-86) 122/56  SpO2:  [94 %-100 %] 96 %  Body mass index is 46 19 kg/m²  Input and Output Summary (last 24 hours): Intake/Output Summary (Last 24 hours) at 2023 1115  Last data filed at 2023 2201  Gross per 24 hour   Intake 120 ml   Output --   Net 120 ml       Physical Exam:    Constitutional: Patient is oriented to person, place and time, no acute distress  HEENT:  Normocephalic, atraumatic  Cardiovascular: Normal S1S2, RRR, No murmurs/rubs/gallops appreciated  Pulmonary:  Bilateral air entry, No rhonchi/rales/wheezing appreciated  Abdominal: Soft, Bowel sounds present, Non-tender, Non-distended  Extremities:  No cyanosis, clubbing or edema  Neurological: Awake, alert  Skin: Left heel with dressing in place    Additional Data:     Labs:    Results from last 7 days   Lab Units 05/29/23  0442 05/28/23  1653   EOS PCT %  --  2   HEMATOCRIT % 30 9* 34 5*   HEMOGLOBIN g/dL 9 4* 10 5*   LYMPHS PCT %  --  17   MONOS PCT %  --  7   NEUTROS PCT %  --  72   PLATELETS Thousands/uL 284 326   WBC Thousand/uL 10 65* 9 32     Results from last 7 days   Lab Units 05/29/23  0442 05/28/23  1653   ALK PHOS U/L  --  123*   ALT U/L  --  24   AST U/L  --  16   BUN mg/dL 19 19   CALCIUM mg/dL 8 8 9 2   CHLORIDE mmol/L 108 104   CO2 mmol/L 24 23   CREATININE mg/dL 1 26 1 49*   POTASSIUM mmol/L 3 3* 3 8            I Have Reviewed All Lab Data Listed Above      Invasive Devices     Peripheral Intravenous Line  Duration           Peripheral IV 05/28/23 Left;Proximal;Ventral (anterior) Forearm <1 day                  Recent Cultures (last 7 days):           Last 24 Hours Medication List:   Current Facility-Administered Medications   Medication Dose Route Frequency Provider Last Rate   • acetaminophen  650 mg Oral Q6H PRN Gisell Beach, DO     • aluminum-magnesium hydroxide-simethicone  30 mL Oral Q6H PRN Gisell Beach, DO     • amLODIPine  5 mg Oral Daily Alfred Hsieh DO     • aspirin  81 mg Oral Daily Alfred Hsieh DO     • atorvastatin  40 mg Oral Daily Alfred Hsieh DO     • budesonide-formoterol  1 puff Inhalation BID Gisell Beach, DO     • calcium carbonate  1,000 mg Oral Daily PRN Gisell Beach, DO     • cholecalciferol  2,000 Units Oral Daily Alfred Hsieh DO     • docusate sodium  100 mg Oral BID Gisell Beach, DO     • gabapentin  100 mg Oral BID Gisell Beach, DO     • heparin (porcine)  5,000 Units Subcutaneous [facility] [address] Alfred Hsieh, DO     • hydrALAZINE  10 mg Oral TID Gisell Beach, DO     • insulin glargine  25 Units Subcutaneous Q12H [address] Alfred Hsieh, DO     • insulin lispro  5 Units Subcutaneous TID With Meals Alfred Johnny Peterson, DO     • metoprolol tartrate  100 mg Oral Q12H [id number] [facility], DO     • ondansetron  4 mg Intravenous Q6H PRN Harmony Cervantes DO     • predniSONE  4 mg Oral Daily Alfred Peterson, DO     • simethicone  80 mg Oral 4x Daily PRN Harmony Cervantes, DO     • topiramate  100 mg Oral HS Alfred Peterson, DO          Today, Patient Was Seen By: Wilmar Young MD

## 2023-05-29 NOTE — UTILIZATION REVIEW
Initial Clinical Review    Admission: Date/Time/Statement:   Admission Orders (From admission, onward)     Ordered        05/28/23 1738  Place in Observation  Once                      Orders Placed This Encounter   Procedures   • Place in Observation     Standing Status:   Standing     Number of Occurrences:   1     Order Specific Question:   Level of Care     Answer:   Med Surg [16]     Order Specific Question:   Bed Type     Answer:   Maria A [4]     ED Arrival Information     Expected   -    Arrival   5/28/2023 16:03    Acuity   Urgent            Means of arrival   Ambulance    Escorted by   [facility] ([address] [facility])    Service   Hospitalist    Admission type   Emergency            Arrival complaint   Syncope           Chief Complaint   Patient presents with   • Fatigue     Pt has been without food for nearly one week  Pt reports drinking some water  Is out of some of her medications that she takes for diabetes, HTN  Son reports they are homeless - [place]  Son also notes that pt has had syncopal and near syncopal episodes over the last few days  Initial Presentation: 57 y o  female who presented by EMS to [address] [place] [address] ED  Admitted in observation status for evaluation and treatment of STEFFI  PMHx: anemia, arthritis, HTN, T2DM, HLD, asthma, seizures,sleep apnea, CKD  Presented w/ poor access to food, not taking medications as prescribed, lightheadedness, dizziness  On exam, generally ill-appearing  Crt 1 49  Plan: IVF, hold losartan and lasix, Trend labs, replete electrolytes as needed; SSI w/ accuchecks ACHS, resume lopressor, amlodipine, topamax         ED Triage Vitals   Temperature Pulse Respirations Blood Pressure SpO2   05/28/23 1619 05/28/23 1610 05/28/23 1610 05/28/23 1610 05/28/23 1610   98 1 °F (36 7 °C) 93 19 127/65 100 %      Temp Source Heart Rate Source Patient Position - Orthostatic VS BP Location FiO2 (%)   05/28/23 1619 05/28/23 1610 05/28/23 1610 05/28/23 1610 --   Oral Monitor Lying Right arm       Pain Score       05/28/23 1610       8          Wt Readings from Last 1 Encounters:   05/29/23 (!) 138 kg (303 lb 12 7 oz)     Additional Vital Signs:   Date/Time Temp Pulse Resp BP MAP (mmHg) SpO2 O2 Device   05/29/23 07:06:38 -- 78 -- 122/56 78 96 % --   05/28/23 23:04:21 98 9 °F (37 2 °C) 77 18 133/63 86 94 % None (Room air)   05/28/23 1943 -- -- -- -- -- -- None (Room air)   05/28/23 18:35:12 98 2 °F (36 8 °C) 103 20 160/86 111 97 % None (Room air)   05/28/23 1619 98 1 °F (36 7 °C) -- -- -- -- -- --     Pertinent Labs/Diagnostic Test Results:   XR chest 2 views   ED Interpretation by DO Craig (05/28 1735)   Interpreted by me as no pneumonia              Results from last 7 days   Lab Units 05/29/23  0442 05/28/23  1653   HEMATOCRIT % 30 9* 34 5*   HEMOGLOBIN g/dL 9 4* 10 5*   NEUTROS ABS Thousands/µL  --  6 80   PLATELETS Thousands/uL 284 326   WBC Thousand/uL 10 65* 9 32         Results from last 7 days   Lab Units 05/29/23  0442 05/28/23  1702 05/28/23  1653   ANION GAP mmol/L 6  --  9   BUN mg/dL 19  --  19   CALCIUM mg/dL 8 8  --  9 2   CHLORIDE mmol/L 108  --  104   CO2 mmol/L 24  --  23   CREATININE mg/dL 1 26  --  1 49*   EGFR ml/min/1 73sq m 45  --  36   POTASSIUM mmol/L 3 3*  --  3 8   MAGNESIUM mg/dL  --   --  1 9   PHOSPHORUS mg/dL  --  3 8  --    SODIUM mmol/L 138  --  136     Results from last 7 days   Lab Units 05/28/23  1653   ALBUMIN g/dL 3 4*   ALK PHOS U/L 123*   ALT U/L 24   AST U/L 16   TOTAL BILIRUBIN mg/dL 0 56   TOTAL PROTEIN g/dL 7 0     Results from last 7 days   Lab Units 05/29/23  0705 05/28/23  2115 05/28/23  1611   POC GLUCOSE mg/dl 238* 196* 252*     Results from last 7 days   Lab Units 05/29/23  0442 05/28/23  1653   GLUCOSE RANDOM mg/dL 187* 263*     Results from last 7 days   Lab Units 05/28/23  1653   HS TNI 0HR ng/L <2         ED Treatment:   Medication Administration from 05/28/2023 1603 to 05/28/2023 1830       Date/Time Order Dose Route Action     05/28/2023 1652 EDT sodium chloride 0 9 % bolus 1,000 mL 1,000 mL Intravenous New Bag        Past Medical History:   Diagnosis Date   • Anemia    • Arthritis    • Benign hypertension     Procedure/Onset: 01/01/2005; Description: BP elevated today  Pt reports running out of BP meds 2wks ago  Will resume BP meds  • Diabetes mellitus ([place] [address] )    • Diabetes mellitus type 2 with complications, uncontrolled 9/8/2015   • Dyspnea on exertion    • Hyperlipidemia    • Hypertension    • Legally blind 2010   • Mild intermittent asthma with exacerbation 8/4/2018   • Miscarriage     x 3   • Polymyalgia rheumatica ([place] [address] ) 11/24/2021   • Seizures ([place] [address] )    • Sickle cell trait (HCC)    • Sleep apnea    • Stage 3a chronic kidney disease ([place] [address] ) 5/19/2022   • Thyroid nodule 3/6/2020     Present on Admission:  • Ambulatory dysfunction  • Diabetic foot ulcers ([place] [address] )  • Legally blind  • Polymyalgia rheumatica ([place] [address] )  • Hypertension  • Seizures ([place] [address] )      Admitting Diagnosis: Dehydration [E86 0]  Deprivation of food [Z59 48]  Syncope [R55]  Fatigue [R53 83]  Diabetic foot ulcers ([place] [address] ) [Y13 929, L97 509]  Homelessness [Z59 00]  History of diabetes mellitus [Z86 39]  Acute kidney injury ([place] [address] ) [N17 9]  Age/Sex: 57 y o  female  Admission Orders:  Consistent Carbohydrate Diet  Accu-checks ACHS  DW  SCDs      Scheduled Medications:  amLODIPine, 5 mg, Oral, Daily  aspirin, 81 mg, Oral, Daily  atorvastatin, 40 mg, Oral, Daily  budesonide-formoterol, 1 puff, Inhalation, BID  cholecalciferol, 2,000 Units, Oral, Daily  docusate sodium, 100 mg, Oral, BID  gabapentin, 100 mg, Oral, BID  heparin (porcine), 5,000 Units, Subcutaneous, Q8H RONI  hydrALAZINE, 10 mg, Oral, TID  insulin glargine, 25 Units, Subcutaneous, Q12H RONI  insulin lispro, 5 Units, Subcutaneous, TID With Meals  metoprolol tartrate, 100 mg, Oral, Q12H RONI  predniSONE, 4 mg, Oral, Daily  topiramate, 100 mg, Oral, HS    Continuous IV Infusions: none    PRN Meds:  acetaminophen, 650 mg, Oral, Q6H PRN  aluminum-magnesium hydroxide-simethicone, 30 mL, Oral, Q6H PRN  calcium carbonate, 1,000 mg, Oral, Daily PRN  ondansetron, 4 mg, Intravenous, Q6H PRN  simethicone, 80 mg, Oral, 4x Daily PRN        IP CONSULT TO CASE MANAGEMENT  IP CONSULT TO PODIATRY  IP CONSULT TO CASE MANAGEMENT    Network Utilization Review Department  ATTENTION: Please call with any questions or concerns to [phone] and carefully listen to the prompts so that you are directed to the right person  All voicemails are confidential   Castro Calderón all requests for admission clinical reviews, approved or denied determinations and any other requests to dedicated fax number below belonging to the campus where the patient is receiving treatment   List of dedicated fax numbers for the Facilities:  [address] DENIALS (Administrative/Medical Necessity) [phone]   [address] (Maternity/NICU/Pediatrics) [phone]   [address] [phone]   [place] [address] [phone]   [address] [place] [address] [address] [place] [place] [address] [facility] [phone]   [address] [facility] Kelsi Pierce jose juan [place] [address] [facility] [phone]

## 2023-05-29 NOTE — ASSESSMENT & PLAN NOTE
Lab Results   Component Value Date    HGBA1C 11 2 (H) 04/19/2023       Recent Labs     05/28/23  1611 05/28/23  2115 05/29/23  0705   POCGLU 252* 196* 238*       · Hold metformin  · Continue Lantus 25 units twice daily, insulin lispro 5 units 3 times daily with meals  · Monitor Accu-Cheks, sliding scale for coverage

## 2023-05-29 NOTE — ASSESSMENT & PLAN NOTE
Patient and son are both homeless  Socially complex history - patient became homeless back in February and has been noted to have multiple ED visits/admissions to both hospital and rehab centers  Patient was recently discharged from [facility] on 5/8/2023 and has been homeless since then

## 2023-05-29 NOTE — ASSESSMENT & PLAN NOTE
· Patient with elevated creatinine on admission  · Improved with IV fluids  · Losartan and furosemide were held, will monitor creatinine and blood pressure and resume appropriately

## 2023-05-29 NOTE — ASSESSMENT & PLAN NOTE
· Left diabetic heel ulcer  · Podiatry input appreciated  · Debrided at bedside, wound stable without signs of any acute infection  · Local wound care

## 2023-05-30 ENCOUNTER — PATIENT OUTREACH (OUTPATIENT)
Dept: FAMILY MEDICINE CLINIC | Facility: CLINIC | Age: 63
End: 2023-05-30

## 2023-05-30 VITALS
SYSTOLIC BLOOD PRESSURE: 160 MMHG | WEIGHT: 287.04 LBS | HEART RATE: 69 BPM | RESPIRATION RATE: 16 BRPM | TEMPERATURE: 99.8 F | DIASTOLIC BLOOD PRESSURE: 79 MMHG | OXYGEN SATURATION: 96 % | BODY MASS INDEX: 43.64 KG/M2

## 2023-05-30 LAB
ANION GAP SERPL CALCULATED.3IONS-SCNC: 6 MMOL/L (ref 4–13)
BASOPHILS # BLD AUTO: 0.07 THOUSANDS/ÂΜL (ref 0–0.1)
BASOPHILS NFR BLD AUTO: 1 % (ref 0–1)
BUN SERPL-MCNC: 13 MG/DL (ref 5–25)
CALCIUM SERPL-MCNC: 8.6 MG/DL (ref 8.4–10.2)
CHLORIDE SERPL-SCNC: 110 MMOL/L (ref 96–108)
CO2 SERPL-SCNC: 24 MMOL/L (ref 21–32)
CREAT SERPL-MCNC: 1.01 MG/DL (ref 0.6–1.3)
EOSINOPHIL # BLD AUTO: 0.28 THOUSAND/ÂΜL (ref 0–0.61)
EOSINOPHIL NFR BLD AUTO: 3 % (ref 0–6)
ERYTHROCYTE [DISTWIDTH] IN BLOOD BY AUTOMATED COUNT: 19.5 % (ref 11.6–15.1)
GFR SERPL CREATININE-BSD FRML MDRD: 58 ML/MIN/1.73SQ M
GLUCOSE SERPL-MCNC: 120 MG/DL (ref 65–140)
GLUCOSE SERPL-MCNC: 207 MG/DL (ref 65–140)
GLUCOSE SERPL-MCNC: 278 MG/DL (ref 65–140)
HCT VFR BLD AUTO: 32.3 % (ref 34.8–46.1)
HGB BLD-MCNC: 9.7 G/DL (ref 11.5–15.4)
IMM GRANULOCYTES # BLD AUTO: 0.05 THOUSAND/UL (ref 0–0.2)
IMM GRANULOCYTES NFR BLD AUTO: 1 % (ref 0–2)
LYMPHOCYTES # BLD AUTO: 2.56 THOUSANDS/ÂΜL (ref 0.6–4.47)
LYMPHOCYTES NFR BLD AUTO: 27 % (ref 14–44)
MCH RBC QN AUTO: 23.5 PG (ref 26.8–34.3)
MCHC RBC AUTO-ENTMCNC: 30 G/DL (ref 31.4–37.4)
MCV RBC AUTO: 78 FL (ref 82–98)
MONOCYTES # BLD AUTO: 0.7 THOUSAND/ÂΜL (ref 0.17–1.22)
MONOCYTES NFR BLD AUTO: 7 % (ref 4–12)
NEUTROPHILS # BLD AUTO: 5.75 THOUSANDS/ÂΜL (ref 1.85–7.62)
NEUTS SEG NFR BLD AUTO: 61 % (ref 43–75)
NRBC BLD AUTO-RTO: 0 /100 WBCS
PLATELET # BLD AUTO: 287 THOUSANDS/UL (ref 149–390)
PMV BLD AUTO: 11.1 FL (ref 8.9–12.7)
POTASSIUM SERPL-SCNC: 3.7 MMOL/L (ref 3.5–5.3)
RBC # BLD AUTO: 4.12 MILLION/UL (ref 3.81–5.12)
SODIUM SERPL-SCNC: 140 MMOL/L (ref 135–147)
WBC # BLD AUTO: 9.41 THOUSAND/UL (ref 4.31–10.16)

## 2023-05-30 PROCEDURE — 85025 COMPLETE CBC W/AUTO DIFF WBC: CPT | Performed by: INTERNAL MEDICINE

## 2023-05-30 PROCEDURE — 80048 BASIC METABOLIC PNL TOTAL CA: CPT | Performed by: INTERNAL MEDICINE

## 2023-05-30 PROCEDURE — 99239 HOSP IP/OBS DSCHRG MGMT >30: CPT | Performed by: INTERNAL MEDICINE

## 2023-05-30 PROCEDURE — 82948 REAGENT STRIP/BLOOD GLUCOSE: CPT

## 2023-05-30 RX ORDER — AMLODIPINE BESYLATE 5 MG/1
5 TABLET ORAL DAILY
Qty: 30 TABLET | Refills: 0 | Status: SHIPPED | OUTPATIENT
Start: 2023-05-30

## 2023-05-30 RX ORDER — INSULIN GLARGINE 100 [IU]/ML
25 INJECTION, SOLUTION SUBCUTANEOUS EVERY 12 HOURS SCHEDULED
Qty: 15 ML | Refills: 0 | Status: SHIPPED | OUTPATIENT
Start: 2023-05-30 | End: 2023-06-29

## 2023-05-30 RX ORDER — TOPIRAMATE 100 MG/1
100 TABLET, FILM COATED ORAL
Qty: 30 TABLET | Refills: 0 | Status: SHIPPED | OUTPATIENT
Start: 2023-05-30

## 2023-05-30 RX ORDER — FUROSEMIDE 20 MG/1
20 TABLET ORAL 2 TIMES DAILY
Qty: 60 TABLET | Refills: 0 | Status: SHIPPED | OUTPATIENT
Start: 2023-05-30

## 2023-05-30 RX ORDER — INSULIN LISPRO 100 [IU]/ML
5 INJECTION, SOLUTION INTRAVENOUS; SUBCUTANEOUS
Qty: 4.5 ML | Refills: 0 | Status: SHIPPED | OUTPATIENT
Start: 2023-05-30 | End: 2023-06-29

## 2023-05-30 RX ORDER — BUDESONIDE AND FORMOTEROL FUMARATE DIHYDRATE 160; 4.5 UG/1; UG/1
1 AEROSOL RESPIRATORY (INHALATION) 2 TIMES DAILY
Qty: 10.2 G | Refills: 0 | Status: SHIPPED | OUTPATIENT
Start: 2023-05-30

## 2023-05-30 RX ORDER — LOSARTAN POTASSIUM 100 MG/1
100 TABLET ORAL DAILY
Qty: 30 TABLET | Refills: 0 | Status: SHIPPED | OUTPATIENT
Start: 2023-05-30

## 2023-05-30 RX ORDER — METOPROLOL TARTRATE 100 MG/1
100 TABLET ORAL EVERY 12 HOURS SCHEDULED
Qty: 60 TABLET | Refills: 0 | Status: SHIPPED | OUTPATIENT
Start: 2023-05-30

## 2023-05-30 RX ORDER — PREDNISONE 1 MG/1
4 TABLET ORAL DAILY
Qty: 120 TABLET | Refills: 0 | Status: SHIPPED | OUTPATIENT
Start: 2023-05-30

## 2023-05-30 RX ORDER — ASPIRIN 81 MG/1
81 TABLET ORAL DAILY
Qty: 30 TABLET | Refills: 0 | Status: SHIPPED | OUTPATIENT
Start: 2023-05-30

## 2023-05-30 RX ORDER — ATORVASTATIN CALCIUM 40 MG/1
40 TABLET, FILM COATED ORAL DAILY
Qty: 30 TABLET | Refills: 0 | Status: SHIPPED | OUTPATIENT
Start: 2023-05-30

## 2023-05-30 RX ORDER — GABAPENTIN 100 MG/1
100 CAPSULE ORAL 2 TIMES DAILY
Qty: 60 CAPSULE | Refills: 0 | Status: SHIPPED | OUTPATIENT
Start: 2023-05-30

## 2023-05-30 RX ORDER — HYDRALAZINE HYDROCHLORIDE 10 MG/1
10 TABLET, FILM COATED ORAL 3 TIMES DAILY
Qty: 90 TABLET | Refills: 0 | Status: SHIPPED | OUTPATIENT
Start: 2023-05-30

## 2023-05-30 RX ORDER — PEN NEEDLE, DIABETIC 32GX 5/32"
NEEDLE, DISPOSABLE MISCELLANEOUS
Qty: 100 EACH | Refills: 0 | Status: SHIPPED | OUTPATIENT
Start: 2023-05-30

## 2023-05-30 RX ADMIN — METOPROLOL TARTRATE 100 MG: 100 TABLET, FILM COATED ORAL at 08:20

## 2023-05-30 RX ADMIN — INSULIN LISPRO 5 UNITS: 100 INJECTION, SOLUTION INTRAVENOUS; SUBCUTANEOUS at 11:52

## 2023-05-30 RX ADMIN — GABAPENTIN 100 MG: 100 CAPSULE ORAL at 08:20

## 2023-05-30 RX ADMIN — HYDRALAZINE HYDROCHLORIDE 10 MG: 10 TABLET, FILM COATED ORAL at 08:20

## 2023-05-30 RX ADMIN — INSULIN LISPRO 1 UNITS: 100 INJECTION, SOLUTION INTRAVENOUS; SUBCUTANEOUS at 08:12

## 2023-05-30 RX ADMIN — HEPARIN SODIUM 5000 UNITS: 5000 INJECTION INTRAVENOUS; SUBCUTANEOUS at 05:45

## 2023-05-30 RX ADMIN — DOCUSATE SODIUM 100 MG: 100 CAPSULE, LIQUID FILLED ORAL at 08:20

## 2023-05-30 RX ADMIN — ASPIRIN 81 MG: 81 TABLET, COATED ORAL at 08:20

## 2023-05-30 RX ADMIN — Medication 2000 UNITS: at 08:20

## 2023-05-30 RX ADMIN — INSULIN GLARGINE 25 UNITS: 100 INJECTION, SOLUTION SUBCUTANEOUS at 08:12

## 2023-05-30 RX ADMIN — PREDNISONE 4 MG: 1 TABLET ORAL at 08:20

## 2023-05-30 RX ADMIN — ATORVASTATIN CALCIUM 40 MG: 40 TABLET, FILM COATED ORAL at 08:20

## 2023-05-30 RX ADMIN — INSULIN LISPRO 5 UNITS: 100 INJECTION, SOLUTION INTRAVENOUS; SUBCUTANEOUS at 08:13

## 2023-05-30 RX ADMIN — AMLODIPINE BESYLATE 5 MG: 5 TABLET ORAL at 08:20

## 2023-05-30 RX ADMIN — BUDESONIDE AND FORMOTEROL FUMARATE DIHYDRATE 1 PUFF: 160; 4.5 AEROSOL RESPIRATORY (INHALATION) at 08:16

## 2023-05-30 NOTE — PROGRESS NOTES
MITZI ONEILL made aware that pt discharged from hospital today  MITZI ONEILL called pt and her son, however, both phones rang without and answer  MITZI ONEILL was unable to leave a message  MITZI ONEILL unable to send unable to reach letter as pt does not have a mailing address  MITZI ONEILL will continue to be available for any additional needs as requested

## 2023-05-30 NOTE — PLAN OF CARE
Problem: METABOLIC, FLUID AND ELECTROLYTES - ADULT  Goal: Glucose maintained within target range  Description: INTERVENTIONS:  - Monitor Blood Glucose as ordered  - Assess for signs and symptoms of hyperglycemia and hypoglycemia  - Administer ordered medications to maintain glucose within target range  - Assess nutritional intake and initiate nutrition service referral as needed  Outcome: Progressing     Problem: SKIN/TISSUE INTEGRITY - ADULT  Goal: Skin Integrity remains intact(Skin Breakdown Prevention)  Description: Assess:  -Perform Walker assessment every shift  -Clean and moisturize skin every day  -Inspect skin when repositioning, toileting, and assisting with ADLS  -Assess extremities for adequate circulation and sensation     Bed Management:  -Have minimal linens on bed & keep smooth, unwrinkled  -Change linens as needed when moist or perspiring      Toileting:  -Offer bedside commode  -Use incontinent care products after each incontinent episode    Activity:  -Encourage activity and walks on unit  -Encourage or provide ROM exercises   -Turn and reposition patient every 2 Hours  -Use appropriate equipment to lift or move patient in bed  -Instruct/ Assist with weight shifting when out of bed in chair  -Consider limitation of chair time    Skin Care:  -Avoid use of baby powder, tape, friction and shearing, hot water or constrictive clothing  -Relieve pressure over bony prominences   -Do not massage red bony areas      Outcome: Progressing  Goal: Incision(s), wounds(s) or drain site(s) healing without S/S of infection  Description: INTERVENTIONS  - Assess and document dressing, incision, wound bed, drain sites and surrounding tissue  - Provide patient and family education  - Perform skin care/dressing changes ev  Outcome: Progressing     Problem: MUSCULOSKELETAL - ADULT  Goal: Maintain or return mobility to safest level of function  Description: INTERVENTIONS:  - Assess patient's ability to carry out ADLs; assess patient's baseline for ADL function and identify physical deficits which impact ability to perform ADLs (bathing, care of mouth/teeth, toileting, grooming, dressing, etc )  - Assess/evaluate cause of self-care deficits   - Assess range of motion  - Assess patient's mobility  - Assess patient's need for assistive devices and provide as appropriate  - Encourage maximum independence but intervene and supervise when necessary  - Involve family in performance of ADLs  - Assess for home care needs following discharge   - Consider OT consult to assist with ADL evaluation and planning for discharge  - Provide patient education as appropriate  Outcome: Progressing  Goal: Maintain proper alignment of affected body part  Description: INTERVENTIONS:  - Support, maintain and protect limb and body alignment  - Provide patient/ family with appropriate education  Outcome: Progressing     Problem: PAIN - ADULT  Goal: Verbalizes/displays adequate comfort level or baseline comfort level  Description: Interventions:  - Encourage patient to monitor pain and request assistance  - Assess pain using appropriate pain scale  - Administer analgesics based on type and severity of pain and evaluate response  - Implement non-pharmacological measures as appropriate and evaluate response  - Consider cultural and social influences on pain and pain management  - Notify physician/advanced practitioner if interventions unsuccessful or patient reports new pain  Outcome: Progressing     Problem: SAFETY ADULT  Goal: Patient will remain free of falls  Description: INTERVENTIONS:  - Educate patient/family on patient safety including physical limitations  - Instruct patient to call for assistance with activity   - Consult OT/PT to assist with strengthening/mobility   - Keep Call bell within reach  - Keep bed low and locked with side rails adjusted as appropriate  - Keep care items and personal belongings within reach  - Initiate and maintain comfort rounds  - Make Fall Risk Sign visible to staff  - Apply yellow socks and bracelet for high fall risk patients  - Consider moving patient to room near nurses station  Outcome: Progressing  Goal: Maintain or return to baseline ADL function  Description: INTERVENTIONS:  -  Assess patient's ability to carry out ADLs; assess patient's baseline for ADL function and identify physical deficits which impact ability to perform ADLs (bathing, care of mouth/teeth, toileting, grooming, dressing, etc )  - Assess/evaluate cause of self-care deficits   - Assess range of motion  - Assess patient's mobility; develop plan if impaired  - Assess patient's need for assistive devices and provide as appropriate  - Encourage maximum independence but intervene and supervise when necessary  - Involve family in performance of ADLs  - Assess for home care needs following discharge   - Consider OT consult to assist with ADL evaluation and planning for discharge  - Provide patient education as appropriate  Outcome: Progressing  Goal: Maintains/Returns to pre admission functional level  Description: INTERVENTIONS:  - Perform BMAT or MOVE assessment daily    - Set and communicate daily mobility goal to care team and patient/family/caregiver     - Collaborate with rehabilitation services on mobility goals if consulted  - Out of bed for toileting  - Record patient progress and toleration of activity level   Outcome: Progressing     Problem: DISCHARGE PLANNING  Goal: Discharge to home or other facility with appropriate resources  Description: INTERVENTIONS:  - Identify barriers to discharge w/patient and caregiver  - Arrange for needed discharge resources and transportation as appropriate  - Identify discharge learning needs (meds, wound care, etc )  - Arrange for interpretive services to assist at discharge as needed  - Refer to Case Management Department for coordinating discharge planning if the patient needs post-hospital services based on physician/advanced practitioner order or complex needs related to functional status, cognitive ability, or social support system  Outcome: Progressing     Problem: Knowledge Deficit  Goal: Patient/family/caregiver demonstrates understanding of disease process, treatment plan, medications, and discharge instructions  Description: Complete learning assessment and assess knowledge base  Interventions:  - Provide teaching at level of understanding  - Provide teaching via preferred learning methods  Outcome: Progressing     Problem: Nutrition/Hydration-ADULT  Goal: Nutrient/Hydration intake appropriate for improving, restoring or maintaining nutritional needs  Description: Monitor and assess patient's nutrition/hydration status for malnutrition  Collaborate with interdisciplinary team and initiate plan and interventions as ordered  Monitor patient's weight and dietary intake as ordered or per policy  Utilize nutrition screening tool and intervene as necessary  Determine patient's food preferences and provide high-protein, high-caloric foods as appropriate       INTERVENTIONS:  - Monitor oral intake, urinary output, labs, and treatment plans  - Assess nutrition and hydration status and recommend course of action  - Evaluate amount of meals eaten  - Assist patient with eating if necessary   - Allow adequate time for meals  - Recommend/ encourage appropriate diets, oral nutritional supplements, and vitamin/mineral supplements  - Order, calculate, and assess calorie counts as needed  - Recommend, monitor, and adjust tube feedings and TPN/PPN based on assessed needs  - Assess need for intravenous fluids  - Provide specific nutrition/hydration education as appropriate  - Include patient/family/caregiver in decisions related to nutrition  Outcome: Progressing

## 2023-05-30 NOTE — DISCHARGE SUMMARY
[facility]  Discharge- Humble Thakkar 1960, 57 y o  female MRN: [id number]  Unit/Bed#: E5 -01 Encounter: [id number]  Primary Care Provider: Juan Estrada MD   Date and time admitted to hospital: 5/28/2023  4:03 PM    * [address]  Patient and son are both homeless  Socially complex history - patient became homeless back in February and has been noted to have multiple ED visits/admissions to both hospital and rehab centers  Patient was recently discharged from [facility] on 5/8/2023 and has been homeless since then        Ulcer of left heel ([address] )  Assessment & Plan  · Left diabetic heel ulcer  · Podiatry input appreciated  · Debrided at bedside, wound stable without signs of any acute infection  · Local wound care    Renal insufficiency  Assessment & Plan  · Patient with elevated creatinine on admission  · Improved with IV fluids      Diabetes mellitus [facility])  Assessment & Plan  Lab Results   Component Value Date    HGBA1C 11 2 (H) 04/19/2023       Recent Labs     05/29/23  0705 05/29/23  1112 05/29/23  1623 05/29/23 2054   POCGLU 238* 243* 244* 282*       · Continue Lantus 25 units twice daily, insulin lispro 5 units 3 times daily with meals, metformin    Legally blind  Assessment & Plan  · Patient is legally blind from diabetic retinopathy      Polymyalgia rheumatica (HCC)  Assessment & Plan  · Pain on prednisone 4 mg daily    Hypertension  Assessment & Plan  Home regimen: Amlodipine 5mg daily, Losartan 100mg daily, Lopressor 100mg q12hrs, Lasix 20mg BID, Hydralazine 10mg TID  Was not taking her BP meds  due to multiple factors - homelessness, difficulty reading small print on pill bottles  • Continue Lopressor, Amlodopine, losartan, Lasix    Class 3 severe obesity with serious comorbidity and body mass index (BMI) of 50 0 to 59 9 in adult [facility])  Assessment & Plan  Recommend outpatient weight loss when appropriate    Seizures (HCC)  Assessment & Plan  · Continue PTA Topamax        Transition of Care Discharge Summary - [address] Internal Medicine    Patient Information: Yogesh Guerrero 57 y o  female MRN: [id number]  Unit/Bed#: E5 -01 Encounter: [id number]    Discharging Physician / Practitioner: Olga Krause MD  PCP: Doretha Heimlich, MD  Admission Date: 5/28/2023  Discharge Date: 05/30/23    Disposition:      Other: home      Reason for Admission: Acute kidney injury, fatigue    Discharge Diagnoses:     Principal Problem:    Homeless  Active Problems:    Seizures ([place] [address] )    Class 3 severe obesity with serious comorbidity and body mass index (BMI) of 50 0 to 59 9 in [facility])    Hypertension    Polymyalgia rheumatica ([place] [address] )    Ambulatory dysfunction    Legally blind    Diabetes mellitus ([place] [address] )    Renal insufficiency    Ulcer of left heel ([place] [address] )  Resolved Problems:    Elevated creatine kinase      Consultations During Hospital Stay:  · IP CONSULT TO CASE MANAGEMENT  · IP CONSULT TO PODIATRY  · IP CONSULT TO CASE MANAGEMENT      Procedures Performed:     · none    Medication Adjustments and Discharge Medications:  · Medication Dosing Tapers - Please refer to Discharge Medication List for details on any medication dosing tapers (if applicable to patient)  · Discharge Medication List: See after visit summary for reconciled discharge medications  Wound Care Recommendations:  When applicable, please see wound care section of After Visit Summary      Diet Recommendations at Discharge:  Diet -        Diet Orders   (From admission, onward)             Start     Ordered    05/29/23 1134  [facility] within [facility]  Once        Question Answer Comment   Guest Tray Type Guest Diabetic    Frequency Dinner        05/29/23 1134    05/29/23 0000  Guest Tray  Once        Question Answer Comment   Guest Tray Type Guest Diabetic    Frequency Breakfast        05/28/23 1801    05/29/23 0000  Guest Tray  Once        Question Answer Comment   Guest Tray Type Guest Diabetic    Frequency Lunch        05/28/23 1801    05/28/23 1802  Diet Adria/CHO Controlled; Consistent Carbohydrate Diet Level 2 (5 carb servings/75 grams CHO/meal)  Diet effective now        References:    Adult Nutrition Support Algorithm    RD Therapeutic Diet Order Protocol   Question Answer Comment   Diet Type Adria/CHO Controlled    Adria/CHO Controlled Consistent Carbohydrate Diet Level 2 (5 carb servings/75 grams CHO/meal)    RD to adjust diet per protocol? Yes        05/28/23 1801    05/28/23 1802  Guest Tray  Once        Question Answer Comment   Guest Tray Type Guest Diabetic    Frequency Dinner        05/28/23 1801              Fluid Restriction - No Fluid Restriction at Discharge  Significant Findings / Test Results:     XR chest 2 views    Result Date: 5/29/2023  · Impression: No acute cardiopulmonary disease  Workstation performed: [id number]     Hospital Course:     Santosh Marquez is a 57 y o  female patient who originally presented to the hospital on 5/28/2023 due to fatigue  Patient has history of diabetes mellitus, diabetic retinopathy resulting in legal blindness, chronic kidney disease, hypertension, hyperlipidemia, polymyalgia rheumatica, seizures  Patient has been homeless since February  Presented with poor access to food found to have acute kidney injury was given IV fluids and her home medications were resumed  Patient is otherwise stable unfortunately she does not have a home at this point, states she is working on finding a place to stay  Medications sent to [facility]  Please see above problem list for further details  Condition at Discharge: good     Discharge Day Visit / Exam:     Subjective: Patient seen and examined at bedside, denies any abdominal pain, dyspnea, chest pain      Vitals: Blood Pressure: 160/79 (05/30/23 0741)  Pulse: 69 (05/30/23 0741)  Temperature: 99 8 °F (37 7 °C) (05/30/23 0741)  Temp Source: Oral (05/30/23 0741)  Respirations: 16 (05/30/23 0741)  Weight - Scale: 130 kg (287 lb 0 6 oz) (05/30/23 0559)  SpO2: 96 % (05/30/23 0741)    Physical Exam:    Constitutional: Patient is oriented to person, place and time, no acute distress  HEENT:  Normocephalic, atraumatic, legally blind  Cardiovascular: Normal S1S2, RRR, No murmurs/rubs/gallops appreciated  Pulmonary:  Bilateral air entry, No rhonchi/rales/wheezing appreciated  Abdominal: Soft, Bowel sounds present, Non-tender, Non-distended  Extremities:  No cyanosis, clubbing or edema  Neurological: awake, alert    Discharge instructions/Information to patient and family:   See after visit summary section titled Discharge Instructions for information provided to patient and family  Planned Readmission: no     Discharge Statement:  I spent 35 minutes discharging the patient  This time was spent on the day of discharge  I had direct contact with the patient on the day of discharge  Greater than 50% of the total time was spent examining patient, answering all patient questions, arranging and discussing plan of care with patient as well as directly providing post-discharge instructions  Additional time then spent on discharge activities      ** Please Note: This note has been constructed using a voice recognition system **

## 2023-05-30 NOTE — CASE MANAGEMENT
Case Management Discharge Planning Note    Patient name Drew [facility]  Location [facility] /E5 MS 1-* MRN [id number]  : 1960 Date 2023       Current Admission Date: 2023  Current Admission Diagnosis:Homeless   Patient Active Problem List    Diagnosis Date Noted   • Ulcer of left heel ([facility] ) 2023   • Homeless 2023   • Renal insufficiency 2023   • Encounter for support and coordination of transition of care 2023   • Food insecurity 2023   • Leukocytosis 2023   • Friction blisters of the soles 2023   • Calculus of gallbladder without cholecystitis without obstruction 2023   • Sepsis ([facility] ) 2023   • Diabetes mellitus ([facility] ) 2023   • Abnormal CT scan 2023   • Suspected pressure injury of deep tissue 03/15/2023   • Dyslipidemia 2023   • Insulin long-term use ([facility] ) 2023   • Type 2 diabetes mellitus with hyperglycemia ([facility] ) 2023   • Ambulatory dysfunction 2023   • Legally blind 2023   • Diarrhea 2023   • Bilateral lower extremity edema 2023   • Unsheltered homelessness 2023   • Abnormal urinalysis 2023   • Weakness 2023   • Intertrigo 2023   • Leg numbness 2023   • Unsteadiness on feet 2022   • Chronic migraine without aura, not intractable, without status migrainosus 2022   • Obstructive sleep apnea 2022   • Diabetic neuropathy ([facility] ) 2022   • Stage 3a chronic kidney disease ([facility] ) 2022   • Polymyalgia rheumatica ([facility] ) 2021   • Gait instability 2021   • Ulnar neuropathy of right upper extremity    • Blurry vision, bilateral 2021   • Depressed mood 10/08/2020   • Type 2 diabetes mellitus with other specified complication ([place] [address] )    • Hypertension 05/10/2020   • Chronic migraine without aura without status migrainosus, not intractable 2020   • Hypersomnia 2020   • Cerebral aneurysm without rupture 04/06/2020   • History of absence seizures 03/25/2020   • Thyroid nodule 03/06/2020   • Aneurysm ([facility] ) 03/05/2020   • Type 2 diabetes mellitus with hyperglycemia, with long-term current use of insulin ([facility] ) 02/21/2020   • Left cavernous carotid aneurysm 02/10/2020   • Polyneuropathy associated with underlying disease ([facility] ) 01/07/2020   • PMR (polymyalgia rheumatica) ([facility] ) 01/07/2020   • Thrombocytosis 01/07/2020   • Morbid obesity ([facility] ) 09/19/2019   • Other inflammatory and immune myopathies, not elsewhere classified 09/16/2019   • Transaminitis 09/16/2019   • Frequent headaches 07/09/2019   • Type 2 diabetes mellitus with microalbuminuria, with long-term current use of insulin ([place] [address] ) 02/01/2019   • Mild intermittent asthma without complication 69/78/5454   • Orthostatic hypotension 06/25/2018   • Lung nodules 03/22/2018   • Exertional dyspnea 02/13/2018   • Enlarged pulmonary artery ([facility] ) 02/13/2018   • Aortic dilatation ([facility] ) 02/13/2018   • Uncontrolled type 2 diabetes mellitus with hyperglycemia ([place] [address] )    • Seizures ([place] [address] )    • Obstructive sleep apnea (adult) (pediatric) 12/18/2017   • Prolonged QT interval 12/18/2017   • Decreased pulses in feet 12/11/2017   • Microalbuminuria 09/08/2015   • Vitamin D deficiency 06/06/2014   • Visual impairment in both eyes 12/06/2012   • Anemia 03/20/2012   • Hyperlipidemia 03/20/2012   • Class 3 severe obesity with serious comorbidity and body mass index (BMI) of 50 0 to 59 9 in adult ([facility] ) 03/20/2012   • Neuropathy of hand, right 03/20/2012      LOS (days): 1  Geometric Mean LOS (GMLOS) (days):   Days to GMLOS:     OBJECTIVE:  Risk of Unplanned Readmission Score: 49 35         Current admission status: Inpatient   Preferred Pharmacy:   [address] [facility], [address] [place] [address]  Phone: [phone] Fax: [phone]    [facility] ([facility]) - Srinivas Ventura 15 "[address]  [address] [address]  Phone: [phone] Fax: 603 [facility] #[id number] - [id number], [address] [facility]  [address] [place] [address],[address] [address]  Phone: [phone] Fax: [phone]    Primary Care Provider: Suresh Goodwin MD    Primary Insurance: [facility] REP  Secondary Insurance: [address] [place] [address] DETAILS:      Additional Comments: CM consult for : \"homeless/no meds\" Email to Financial Counselors for [facility]  CM provided printed list of shelters & 2-1-1 flyer                          "

## 2023-05-30 NOTE — ASSESSMENT & PLAN NOTE
Patient and son are both homeless  Socially complex history - patient became homeless back in February and has been noted to have multiple ED visits/admissions to both hospital and rehab centers  Patient was recently discharged from [facility] on 5/8/2023 and has been homeless since then

## 2023-05-30 NOTE — NURSING NOTE
Discharge instructions reviewed with pt  She is aware of medication changes, follow-up appointments, and son is picking up prescriptions from [address] [place] [address] Katarzyna Alvarez  Left foot dressing changed prior to DC, dressing supplies for several days sent with pt at discharge  Pt has no questions or concerns  Lyft ride was provided for pt and her son, all belongings sent with pt

## 2023-05-30 NOTE — ASSESSMENT & PLAN NOTE
Lab Results   Component Value Date    HGBA1C 11 2 (H) 04/19/2023       Recent Labs     05/29/23  0705 05/29/23  1112 05/29/23  1623 05/29/23 2054   POCGLU 238* 243* 244* 282*       · Hold metformin  · Continue Lantus 25 units twice daily, insulin lispro 5 units 3 times daily with meals  · Monitor Accu-Cheks, sliding scale for coverage

## 2023-05-30 NOTE — CASE MANAGEMENT
Case Management Discharge Planning Note    Patient name Harper Morrison  Location [facility] /E5 MS 1-* MRN [id number]  : 1960 Date 2023       Current Admission Date: 2023  Current Admission Diagnosis:Homeless   Patient Active Problem List    Diagnosis Date Noted   • Ulcer of left heel ([facility] ) 2023   • Homeless 2023   • Renal insufficiency 2023   • Encounter for support and coordination of transition of care 2023   • Food insecurity 2023   • Leukocytosis 2023   • Friction blisters of the soles 2023   • Calculus of gallbladder without cholecystitis without obstruction 2023   • Sepsis ([facility] ) 2023   • Diabetes mellitus ([place] [address] ) 2023   • Abnormal CT scan 2023   • Suspected pressure injury of deep tissue 03/15/2023   • Dyslipidemia 2023   • Insulin long-term use ([place] [address] ) 2023   • Type 2 diabetes mellitus with hyperglycemia ([place] [address] ) 2023   • Ambulatory dysfunction 2023   • Legally blind 2023   • Diarrhea 2023   • Bilateral lower extremity edema 2023   • Unsheltered homelessness 2023   • Abnormal urinalysis 2023   • Weakness 2023   • Intertrigo 2023   • Leg numbness 2023   • Unsteadiness on feet 2022   • Chronic migraine without aura, not intractable, without status migrainosus 2022   • Obstructive sleep apnea 2022   • Diabetic neuropathy ([facility] ) 2022   • Stage 3a chronic kidney disease ([facility] ) 2022   • Polymyalgia rheumatica ([facility] ) 2021   • Gait instability 2021   • Ulnar neuropathy of right upper extremity    • Blurry vision, bilateral 2021   • Depressed mood 10/08/2020   • Type 2 diabetes mellitus with other specified complication ([facility] ) 3082   • Hypertension 05/10/2020   • Chronic migraine without aura without status migrainosus, not intractable 2020   • Hypersomnia 2020   • Cerebral aneurysm without rupture 04/06/2020   • History of absence seizures 03/25/2020   • Thyroid nodule 03/06/2020   • Aneurysm ([facility] ) 03/05/2020   • Type 2 diabetes mellitus with hyperglycemia, with long-term current use of insulin ([facility] ) 02/21/2020   • Left cavernous carotid aneurysm 02/10/2020   • Polyneuropathy associated with underlying disease ([facility] ) 01/07/2020   • PMR (polymyalgia rheumatica) ([facility] ) 01/07/2020   • Thrombocytosis 01/07/2020   • Morbid obesity ([facility] ) 09/19/2019   • Other inflammatory and immune myopathies, not elsewhere classified 09/16/2019   • Transaminitis 09/16/2019   • Frequent headaches 07/09/2019   • Type 2 diabetes mellitus with microalbuminuria, with long-term current use of insulin ([place] [address] ) 02/01/2019   • Mild intermittent asthma without complication 55/87/8451   • Orthostatic hypotension 06/25/2018   • Lung nodules 03/22/2018   • Exertional dyspnea 02/13/2018   • Enlarged pulmonary artery ([facility] ) 02/13/2018   • Aortic dilatation ([facility] ) 02/13/2018   • Uncontrolled type 2 diabetes mellitus with hyperglycemia ([place] [address] )    • Seizures ([place] [address] )    • Obstructive sleep apnea (adult) (pediatric) 12/18/2017   • Prolonged QT interval 12/18/2017   • Decreased pulses in feet 12/11/2017   • Microalbuminuria 09/08/2015   • Vitamin D deficiency 06/06/2014   • Visual impairment in both eyes 12/06/2012   • Anemia 03/20/2012   • Hyperlipidemia 03/20/2012   • Class 3 severe obesity with serious comorbidity and body mass index (BMI) of 50 0 to 59 9 in adult ([facility] ) 03/20/2012   • Neuropathy of hand, right 03/20/2012      LOS (days): 1  Geometric Mean LOS (GMLOS) (days):   Days to GMLOS:     OBJECTIVE:  Risk of Unplanned Readmission Score: 49 35         Current admission status: Inpatient   Preferred Pharmacy:   [address] [facility], [address] [place] [address]  Phone: [phone] Fax: [phone]    [facility] ([facility]) - Srinivas Ventura  [address]  39[address] [facility]  [address]  [facility] [address]  Phone: [phone] Fax: [address] 3015 [facility], [address]  [address] [facility],[address] Floor [address]  Phone: [phone] Fax: [phone]    Primary Care Provider: Jasbir Tesfaye MD    Primary Insurance: [facility] DUAL COMPLETE [facility] REP  Secondary Insurance: [facility]    DISCHARGE DETAILS:    Discharge planning discussed with[de-identified] opatient and son  Freedom of Choice: Yes  Comments - Freedom of Choice: CM provided patient & son with printed lists of shelters, street medicine, 2-1-1 flyer & various other resources     Were Treatment Team discharge recommendations reviewed with patient/caregiver?: Yes  Did patient/caregiver verbalize understanding of patient care needs?: Yes       Contacts  Reason/Outcome: Discharge [address]         Is the patient interested in [address] at discharge?: No    DME Referral Provided  Referral made for DME?: No  Pt has off loading boot in her possession    Other Referral/Resources/Interventions Provided:  Financial Resources Provided: Other (Comment) (email to [facility] sent)  Interventions: None Indicated         Treatment Team Recommendation: Shelter  Discharge Destination Plan[de-identified] Shelter  Transport at Discharge : Free Local Transportation ([address]) to [facility]

## 2023-05-31 ENCOUNTER — PATIENT OUTREACH (OUTPATIENT)
Dept: FAMILY MEDICINE CLINIC | Facility: CLINIC | Age: 63
End: 2023-05-31

## 2023-05-31 NOTE — PROGRESS NOTES
"I called the patient to follow up after a recent hospitalization  She states she has her medications and will be organizing them this morning  The patient notes she has a glucometer but does not know the name  Once she locates it she will let us know as she is in need of test strips  The patient stated she and her son stayed with her  at his nursing home last night  He has offered for them to stay and I encouraged her to take advantage of the help  She was happy to be in a safe place and off the streets  I informed her today's weather is going to be hot and it would be wise to stay since there is air conditioning  She did note she does not do well in the heat  The patient stated she performed wound care on her heel this morning  I suggested since she is inside and safe, to stay, elevate her feet and offload  The patient noted she is motivated to contact politicians regarding the homeless community  She states it is an \"injustice\" how this community is overlooked and how they are treated  The patient will call with any questions or concerns  I will continue to follow  Chart reviewed        "

## 2023-05-31 NOTE — UTILIZATION REVIEW
Initial Clinical Review     Admission: Date/Time/Statement:       Admission Orders (From admission, onward)       Ordered         05/28/23 1738   Place in Observation  Once                         05/29/23 1416  Inpatient Admission  Once        Transfer Service: Hospitalist    Question Answer Comment   Level of Care Med Surg    Estimated length of stay More than 2 Midnights    Certification I certify that inpatient services are medically necessary for this patient for a duration of greater than two midnights  See H&P and MD Progress Notes for additional information about the patient's course of treatment  05/29/23 1415   OBSERVATION   5/28 @   1738 CHANGED TO IP ADMISSION   5/29 @   1416  The patient will continue to require additional inpatient hospital stay due to Elevated creatinine, homeless          Orders Placed This Encounter                        Order Specific Question:   Level of Care       Answer:   Med Surg [16]       Order Specific Question:   Bed Type       Answer:   Maria A [4]               ED Arrival Information               Expected   -    Arrival   5/28/2023 16:03    Acuity   Urgent              Means of arrival   Ambulance    Escorted by   [facility] ([facility])    Service   Hospitalist    Admission type   Emergency              Arrival complaint   Syncope                  Chief Complaint   Patient presents with   • Fatigue       Pt has been without food for nearly one week  Pt reports drinking some water  Is out of some of her medications that she takes for diabetes, HTN  Son reports they are homeless - [place]  Son also notes that pt has had syncopal and near syncopal episodes over the last few days            Initial Presentation: 57 y o  female who presented by EMS to [address] [address] ED  Admitted in observation status for evaluation and treatment of STEFFI  PMHx: anemia, arthritis, HTN, T2DM, HLD, asthma, seizures,sleep apnea, CKD  Presented w/ poor access to food, not taking medications as prescribed, lightheadedness, dizziness  On exam, generally ill-appearing  Crt 1 49  Plan: IVF, hold losartan and lasix, Trend labs, replete electrolytes as needed; SSI w/ accuchecks ACHS, resume lopressor, amlodipine, topamax  5/29 IP ADMISSION  Podiatry consult  Left diabetic heel ulcer debrided at bedside  Wound stable, no acute infection  Wound improving  Can be   WBAT  L LE  With heel offloading  Shoe  Continue local wound care  Continue to monitor labs  Creatinine  Elevated  Continue  PT/OT  Continue current meds      5/30  D/C  To shelter               ED Triage Vitals   Temperature Pulse Respirations Blood Pressure SpO2   05/28/23 1619 05/28/23 1610 05/28/23 1610 05/28/23 1610 05/28/23 1610   98 1 °F (36 7 °C) 93 19 127/65 100 %       Temp Source Heart Rate Source Patient Position - Orthostatic VS BP Location FiO2 (%)   05/28/23 1619 05/28/23 1610 05/28/23 1610 05/28/23 1610 --   Oral Monitor Lying Right arm         Pain Score           05/28/23 1610           8                  Wt Readings from Last 1 Encounters:   05/29/23 (!) 138 kg (303 lb 12 7 oz)      Additional Vital Signs:   Date/Time Temp Pulse Resp BP MAP (mmHg) SpO2 O2 Device   05/29/23 07:06:38 -- 78 -- 122/56 78 96 % --   05/28/23 23:04:21 98 9 °F (37 2 °C) 77 18 133/63 86 94 % None (Room air)   05/28/23 1943 -- -- -- -- -- -- None (Room air)   05/28/23 18:35:12 98 2 °F (36 8 °C) 103 20 160/86 111 97 % None (Room air)   05/28/23 1619 98 1 °F (36 7 °C) -- -- -- -- -- --      Pertinent Labs/Diagnostic Test Results:   XR chest 2 views   ED Interpretation by DO Craig (05/28 6065)   Interpreted by me as no pneumonia                     Results from last 7 days   Lab Units 05/29/23  0442 05/28/23  1653   HEMATOCRIT % 30 9* 34 5*   HEMOGLOBIN g/dL 9 4* 10 5*   NEUTROS ABS Thousands/µL  --  6 80   PLATELETS Thousands/uL 284 326   WBC Thousand/uL 10 65* 9 32     Results from last 7 days   Lab Units 05/29/23  0442 05/28/23  1702 05/28/23  1653   ANION GAP mmol/L 6  --  9   BUN mg/dL 19  --  19   CALCIUM mg/dL 8 8  --  9 2   CHLORIDE mmol/L 108  --  104   CO2 mmol/L 24  --  23   CREATININE mg/dL 1 26  --  1 49*   EGFR ml/min/1 73sq m 45  --  36   POTASSIUM mmol/L 3 3*  --  3 8   MAGNESIUM mg/dL  --   --  1 9   PHOSPHORUS mg/dL  --  3 8  --    SODIUM mmol/L 138  --  136           Results from last 7 days   Lab Units 05/28/23  1653   ALBUMIN g/dL 3 4*   ALK PHOS U/L 123*   ALT U/L 24   AST U/L 16   TOTAL BILIRUBIN mg/dL 0 56   TOTAL PROTEIN g/dL 7 0             Results from last 7 days   Lab Units 05/29/23  0705 05/28/23  2115 05/28/23  1611   POC GLUCOSE mg/dl 238* 196* 252*            Results from last 7 days   Lab Units 05/29/23  0442 05/28/23  1653   GLUCOSE RANDOM mg/dL 187* 263*           Results from last 7 days   Lab Units 05/28/23  1653   HS TNI 0HR ng/L <2            ED Treatment:             Medication Administration from 05/28/2023 1603 to 05/28/2023 1830        Date/Time Order Dose Route Action       05/28/2023 1652 EDT sodium chloride 0 9 % bolus 1,000 mL 1,000 mL Intravenous New Bag                   Present on Admission:  • Ambulatory dysfunction  • Diabetic foot ulcers (HCC)  • Legally blind  • Polymyalgia rheumatica (HCC)  • Hypertension  • Seizures ([place] [address] )        Admitting Diagnosis: Dehydration [E86 0]  Deprivation of food [Z59 48]  Syncope [R55]  Fatigue [R53 83]  Diabetic foot ulcers ([place] [address] ) [D89 592, L97 509]  Homelessness [Z59 00]  History of diabetes mellitus [Z86 39]  Acute kidney injury ([place] [address] ) [N17 9]  Age/Sex: 57 y o  female  Admission Orders:  Consistent Carbohydrate Diet  Accu-checks ACHS    DW      SCDs      Scheduled Medications:  amLODIPine, 5 mg, Oral, Daily  aspirin, 81 mg, Oral, Daily  atorvastatin, 40 mg, Oral, Daily  budesonide-formoterol, 1 puff, Inhalation, BID  cholecalciferol, 2,000 Units, Oral, Daily  docusate sodium, 100 mg, Oral, BID  gabapentin, 100 mg, Oral, BID  heparin (porcine), 5,000 Units, Subcutaneous, Q8H RONI  hydrALAZINE, 10 mg, Oral, TID  insulin glargine, 25 Units, Subcutaneous, Q12H RONI  insulin lispro, 5 Units, Subcutaneous, TID With Meals  metoprolol tartrate, 100 mg, Oral, Q12H RONI  predniSONE, 4 mg, Oral, Daily  topiramate, 100 mg, Oral, HS     Continuous IV Infusions: none     PRN Meds:  acetaminophen, 650 mg, Oral, Q6H PRN  aluminum-magnesium hydroxide-simethicone, 30 mL, Oral, Q6H PRN  calcium carbonate, 1,000 mg, Oral, Daily PRN  ondansetron, 4 mg, Intravenous, Q6H PRN  simethicone, 80 mg, Oral, 4x Daily PRN           IP CONSULT TO CASE MANAGEMENT  IP CONSULT TO PODIATRY  IP CONSULT TO CASE MANAGEMENT     Network Utilization Review Department  ATTENTION: Please call with any questions or concerns to [phone] and carefully listen to the prompts so that you are directed to the right person  All voicemails are confidential   Wendy Ingramef all requests for admission clinical reviews, approved or denied determinations and any other requests to dedicated fax number below belonging to the campus where the patient is receiving treatment   List of dedicated fax numbers for the Facilities:  [address] DENIALS (Administrative/Medical Necessity) [phone]   [address] (Maternity/NICU/Pediatrics) [phone]   916 Lisa Moran [phone]   [facility] [phone]   [address] 28 [phone]   [address] [facility] [place] [facility] 134 [facility]15 [facility] [phone]

## 2023-06-01 ENCOUNTER — PATIENT OUTREACH (OUTPATIENT)
Dept: FAMILY MEDICINE CLINIC | Facility: CLINIC | Age: 63
End: 2023-06-01

## 2023-06-01 NOTE — PROGRESS NOTES
MITZI ONEILL called pt to follow up on her progress  MITZI ONEILL was able to speak with pt who stated that she is is currently staying with a friend  Pt stated that she is not able to stay long and will be moving on soon  MITZI ONEILL inquired if pt and son where able to follow up with the resources that MITZI ONEILL provided including the rental assistance programs  Pt stated that they did not get a chance to and they where working on unloading some of their possessions to make it easier for them to get around  MITZI ONEILL inquired about medications and pt stated that she has PACE so she does not pay anything for her medications  Pt explained that she prefers to use [facility] but is not able to get to them all the time and was unable to collect her medications  MITZI ONEILL encouraged pt to discuss pharmacy with provider to ensure that medications as sent to a pharmacy that she is able to get to  MITZI ONEILL discussed access to meals and pt stated that she is eating better now as she just got her money  MITZI ONEILL discussed going to drop in centers and soup alejandra to help reduce OOP expenses for food  Pt expressed that her diet needs to be specialized and MITZI ONEILL encouraged pt to see if it would be appropriate for her  MITZI ONEILL discussed pt going to the drop in center to assist with providing a safe environment for pt to get out of the elements and try to off load her weight from her foot to facilitate the healing  Pt stated that she has been doing her wound care  MITZI ONEILL discussed resources and inquired if she would be okay if pt emailed resources to pt so that she can have them digitally  Pt expressed agreement and requested that MITZI ONEILL e-mail it to [email or web address]  MITZI ONEILL emailed resources including landlord list, homeless resources, food marmolejo to pt  MITZI ONEILL encouraged pt to call the landlords and look into room rentals that would be more affordable and contacting [employer] for assistance with rental assistance   MITZI ONEILL informed pt that [facility] will be following up with her to assist with completing housing applications that where appropriate  MITZI ONEILL encouraged pt to call MITZI ONEILL if she had any questions or needed assistance

## 2023-06-05 ENCOUNTER — PATIENT OUTREACH (OUTPATIENT)
Dept: FAMILY MEDICINE CLINIC | Facility: CLINIC | Age: 63
End: 2023-06-05

## 2023-06-05 NOTE — PROGRESS NOTES
I called the patient to remind her of her PCP appointment today but her voicemail was full  I will continue further outreach

## 2023-06-06 ENCOUNTER — PATIENT OUTREACH (OUTPATIENT)
Dept: FAMILY MEDICINE CLINIC | Facility: CLINIC | Age: 63
End: 2023-06-06

## 2023-06-06 NOTE — PROGRESS NOTES
Outgoing Call  06/06/2023    OC called Cary Gowers on this day regarding referral received from Brien Palacios to assist with Housing applications  Amberstujorge Freeman did not answer at this time  CMOC was unable to leave voicemail due mailbox is full  [facility] will continue to follow up  Next follow up was scheduled on 06/09/2023

## 2023-06-10 ENCOUNTER — HOSPITAL ENCOUNTER (EMERGENCY)
Facility: HOSPITAL | Age: 63
Discharge: HOME | End: 2023-06-10
Attending: EMERGENCY MEDICINE
Payer: COMMERCIAL

## 2023-06-10 VITALS
DIASTOLIC BLOOD PRESSURE: 70 MMHG | RESPIRATION RATE: 18 BRPM | HEART RATE: 90 BPM | OXYGEN SATURATION: 95 % | SYSTOLIC BLOOD PRESSURE: 178 MMHG | TEMPERATURE: 97.7 F

## 2023-06-10 DIAGNOSIS — R53.83 FATIGUE: Primary | ICD-10-CM

## 2023-06-10 LAB
ANION GAP SERPL CALCULATED.3IONS-SCNC: 3 MMOL/L (ref 4–13)
BASOPHILS # BLD AUTO: 0.07 THOUSANDS/ÂΜL (ref 0–0.1)
BASOPHILS NFR BLD AUTO: 1 % (ref 0–1)
BUN SERPL-MCNC: 17 MG/DL (ref 5–25)
CALCIUM SERPL-MCNC: 9.8 MG/DL (ref 8.3–10.1)
CHLORIDE SERPL-SCNC: 108 MMOL/L (ref 96–108)
CO2 SERPL-SCNC: 28 MMOL/L (ref 21–32)
CREAT SERPL-MCNC: 1.05 MG/DL (ref 0.6–1.3)
EOSINOPHIL # BLD AUTO: 0.36 THOUSAND/ÂΜL (ref 0–0.61)
EOSINOPHIL NFR BLD AUTO: 3 % (ref 0–6)
ERYTHROCYTE [DISTWIDTH] IN BLOOD BY AUTOMATED COUNT: 19.7 % (ref 11.6–15.1)
GFR SERPL CREATININE-BSD FRML MDRD: 56 ML/MIN/1.73SQ M
GLUCOSE SERPL-MCNC: 154 MG/DL (ref 65–140)
HCT VFR BLD AUTO: 40.3 % (ref 34.8–46.1)
HGB BLD-MCNC: 12.1 G/DL (ref 11.5–15.4)
IMM GRANULOCYTES # BLD AUTO: 0.04 THOUSAND/UL (ref 0–0.2)
IMM GRANULOCYTES NFR BLD AUTO: 0 % (ref 0–2)
LYMPHOCYTES # BLD AUTO: 3.74 THOUSANDS/ÂΜL (ref 0.6–4.47)
LYMPHOCYTES NFR BLD AUTO: 34 % (ref 14–44)
MCH RBC QN AUTO: 23.4 PG (ref 26.8–34.3)
MCHC RBC AUTO-ENTMCNC: 30 G/DL (ref 31.4–37.4)
MCV RBC AUTO: 78 FL (ref 82–98)
MONOCYTES # BLD AUTO: 0.79 THOUSAND/ÂΜL (ref 0.17–1.22)
MONOCYTES NFR BLD AUTO: 7 % (ref 4–12)
NEUTROPHILS # BLD AUTO: 6.01 THOUSANDS/ÂΜL (ref 1.85–7.62)
NEUTS SEG NFR BLD AUTO: 55 % (ref 43–75)
NRBC BLD AUTO-RTO: 0 /100 WBCS
PLATELET # BLD AUTO: 475 THOUSANDS/UL (ref 149–390)
PMV BLD AUTO: 10.4 FL (ref 8.9–12.7)
POTASSIUM SERPL-SCNC: 3.4 MMOL/L (ref 3.5–5.3)
RBC # BLD AUTO: 5.16 MILLION/UL (ref 3.81–5.12)
SODIUM SERPL-SCNC: 139 MMOL/L (ref 135–147)
WBC # BLD AUTO: 11.01 THOUSAND/UL (ref 4.31–10.16)

## 2023-06-10 PROCEDURE — 80048 BASIC METABOLIC PNL TOTAL CA: CPT | Performed by: STUDENT IN AN ORGANIZED HEALTH CARE EDUCATION/TRAINING PROGRAM

## 2023-06-10 PROCEDURE — 99285 EMERGENCY DEPT VISIT HI MDM: CPT

## 2023-06-10 PROCEDURE — 36415 COLL VENOUS BLD VENIPUNCTURE: CPT | Performed by: STUDENT IN AN ORGANIZED HEALTH CARE EDUCATION/TRAINING PROGRAM

## 2023-06-10 PROCEDURE — 85025 COMPLETE CBC W/AUTO DIFF WBC: CPT | Performed by: STUDENT IN AN ORGANIZED HEALTH CARE EDUCATION/TRAINING PROGRAM

## 2023-06-10 NOTE — ED PROVIDER NOTES
History  Chief Complaint   Patient presents with   • Weakness - Generalized     Pt states shes been feeling weak, states she hasnt eaten in 2 days  Denies chest pain/SOB     Patient is a 40-year-old female, past medical history including diabetes, hyperlipidemia, hypertension, polymyalgia rheumatica, seizure disorder, CKD, and anemia, who presents to the emergency department for generalized weakness  Patient is currently homeless and is residing at the [facility]  She states today while at the [facility] she began to feel weak  She was brought to the back room and 911 was called  Vitals for EMS were stable  On arrival patient continues to endorse generalized weakness  Denies any chest pain or shortness of breath  No nausea, vomiting, or diarrhea  No abdominal pain or back pain  She has no other complaints or concerns at this time  Chart reviewed  Patient with recent admission from 5/28/2023  Initially presented to the hospital for fatigue  Was found to have an STEFFI  She was given IV fluids  She was admitted to medicine  She was discharged on 5/30/2023          Prior to Admission Medications   Prescriptions Last Dose Informant Patient Reported? Taking? Blood Glucose Monitoring Suppl (OneTouch Verio Reflect) w/Device KIT   No No   Sig: Use 1 each 4 (four) times a day   Blood Pressure Monitor KIT   No No   Sig: Check blood pressures BID   Continuous Blood Gluc  (FreeStyle Denisa 2 [place]) ERIC   No No   Sig: Use 1 each 4 (four) times a day   Continuous Blood Gluc Sensor (FreeStyle Denisa 2 Sensor) MISC   No No   Sig: Use 1 each every 14 (fourteen) days   Emollient (eucerin) lotion   No No   Sig: Apply 1 application  topically 2 (two) times a day = to B/L LE   Insulin Pen Needle (BD Pen Needle Tita 2nd Gen) 32G X 4 MM MISC   No No   Sig: For use with insulin pen  Pharmacy may dispense brand covered by insurance     Insulin Pen Needle (BD Pen Needle Tita U/F) 32G X 4 MM MISC   No No   Sig: Use four times daily as directed with insulin pen   Lancets (OneTouch Delica Plus [id number]) MISC   No No   Sig: Use 1 each 4 (four) times a day   Nutritional Supplements (ProSource No Carb) LIQD   Yes No   Sig: Take 30 mL by mouth 2 (two) times a day   Petrolatum-Zinc Oxide 57-17 % PSTE   Yes No   Sig: Apply 1 application  topically 3 (three) times a day To sacral area - protective   acetaminophen (TYLENOL) 325 mg tablet   Yes No   Sig: Take 650 mg by mouth every 6 (six) hours as needed for mild pain   amLODIPine (NORVASC) 5 mg tablet   No No   Sig: Take 1 tablet (5 mg total) by mouth daily   aspirin (ECOTRIN LOW STRENGTH) 81 mg EC tablet   No No   Sig: Take 1 tablet (81 mg total) by mouth daily   atorvastatin (LIPITOR) 40 mg tablet   No No   Sig: Take 1 tablet (40 mg total) by mouth daily   budesonide-formoterol (Symbicort) 160-4 5 mcg/act inhaler   No No   Sig: Inhale 1 puff 2 (two) times a day Rinse mouth after use  cholecalciferol (VITAMIN D3) 1,000 units tablet   No No   Sig: Take 2 tablets (2,000 Units total) by mouth daily   docusate sodium (COLACE) 100 mg capsule   No No   Sig: Take 1 capsule (100 mg total) by mouth 2 (two) times a day   furosemide (LASIX) 20 mg tablet   No No   Sig: Take 1 tablet (20 mg total) by mouth 2 (two) times a day   gabapentin (NEURONTIN) 100 mg capsule   No No   Sig: Take 1 capsule (100 mg total) by mouth 2 (two) times a day   glucose blood (OneTouch Verio) test strip   No No   Sig: Check blood sugars four times daily  Please substitute with appropriate alternative as covered by patient's insurance  Dx: E11 65   hydrALAZINE (APRESOLINE) 10 mg tablet   No No   Sig: Take 1 tablet (10 mg total) by mouth 3 (three) times a day   insulin glargine (LANTUS) 100 units/mL subcutaneous injection   No No   Sig: Inject 25 Units under the skin every 12 (twelve) hours   insulin lispro (HumaLOG) 100 units/mL injection   Yes No   Sig: Inject under the skin Sliding scale   Inject as per sliding scale:  if 0 - 150 = 0 unit;  151 - 200 = 2 units;  201 - 250 = 4 units;  251 - 300 = 6 units;  301 - 350 = 8 units;  351 - 400 = 10 units less than 70 or above 400 notify MD,  subcutaneously before meals and at bedtime for DM   insulin lispro (HumaLOG) 100 units/mL injection   No No   Sig: Inject 5 Units under the skin 3 (three) times a day with meals   loperamide (IMODIUM) 2 mg capsule   No No   Sig: Take 1 capsule (2 mg total) by mouth 4 (four) times a day as needed for diarrhea (can have up to 8mg per day)   losartan (COZAAR) 100 MG tablet   No No   Sig: Take 1 tablet (100 mg total) by mouth daily   metFORMIN (GLUCOPHAGE) 1000 MG tablet   No No   Sig: Take 1 tablet (1,000 mg total) by mouth 2 (two) times a day with meals   metoprolol tartrate (LOPRESSOR) 100 mg tablet   No No   Sig: Take 1 tablet (100 mg total) by mouth every 12 (twelve) hours   multivitamin-iron-minerals-folic acid (THERAPEUTIC-M) TABS tablet   No No   Sig: Take 1 tablet by mouth daily   predniSONE 1 mg tablet   No No   Sig: Take 4 tablets (4 mg total) by mouth daily   topiramate (TOPAMAX) 100 mg tablet   No No   Sig: Take 1 tablet (100 mg total) by mouth daily at bedtime      Facility-Administered Medications: None       Past Medical History:   Diagnosis Date   • Anemia    • Arthritis    • Benign hypertension     Procedure/Onset: 01/01/2005; Description: BP elevated today  Pt reports running out of BP meds 2wks ago  Will resume BP meds     • Diabetes mellitus ([facility] )    • Diabetes mellitus type 2 with complications, uncontrolled 9/8/2015   • Dyspnea on exertion    • Hyperlipidemia    • Hypertension    • Legally blind 2010   • Mild intermittent asthma with exacerbation 8/4/2018   • Miscarriage     x 3   • Polymyalgia rheumatica ([facility] ) 11/24/2021   • Seizures ([facility] )    • Sickle cell trait ([facility] )    • Sleep apnea    • Stage 3a chronic kidney disease ([facility] ) 5/19/2022   • Thyroid nodule 3/6/2020       Past Surgical History:   Procedure Laterality Date   • CATARACT EXTRACTION Bilateral     august and september   • EYE SURGERY     • HYSTERECTOMY      37   • OOPHORECTOMY Left     37   • OH LIGATION/BIOPSY TEMPORAL ARTERY Bilateral 10/17/2019    Procedure: BIOPSY ARTERY TEMPORAL;  Surgeon: Iza Thomas DO;  Location: [facility];  Service: Vascular   • US GUIDED THYROID BIOPSY  5/13/2020       Family History   Problem Relation Age of Onset   • Heart attack Mother    • Heart disease Mother    • Hypertension Mother    • No Known Problems Father    • Heart disease Sister    • No Known Problems Brother    • No Known Problems Son    • No Known Problems Daughter    • Heart attack Maternal Grandmother    • Heart disease Maternal Grandmother    • Diabetes Other    • Heart attack Other    • No Known Problems Sister    • No Known Problems Sister    • No Known Problems Paternal Aunt    • No Known Problems Son    • Cancer Neg Hx    • Stroke Neg Hx      I have reviewed and agree with the history as documented  E-Cigarette/Vaping   • E-Cigarette Use Never User      E-Cigarette/Vaping Substances     Social History     Tobacco Use   • Smoking status: Never   • Smokeless tobacco: Never   Vaping Use   • Vaping Use: Never used   Substance Use Topics   • Alcohol use: Never     Alcohol/week: 0 0 standard drinks of alcohol   • Drug use: No        Review of Systems   Constitutional: Positive for fatigue  Negative for chills and fever  Respiratory: Negative for shortness of breath  Cardiovascular: Negative for chest pain  Gastrointestinal: Negative for nausea and vomiting  Neurological: Positive for weakness  All other systems reviewed and are negative        Physical Exam  ED Triage Vitals   Temperature Pulse Respirations Blood Pressure SpO2   06/10/23 0324 06/10/23 0324 06/10/23 0324 06/10/23 0329 06/10/23 0324   97 7 °F (36 5 °C) 90 18 (!) 178/70 95 %      Temp src Heart Rate Source Patient Position - Orthostatic VS BP Location FiO2 (%)   -- 06/10/23 0324 -- -- --    Monitor Pain Score       --                    Orthostatic Vital Signs  Vitals:    06/10/23 0324 06/10/23 0329   BP:  (!) 178/70   Pulse: 90        Physical Exam  Vitals and nursing note reviewed  Constitutional:       General: She is not in acute distress  Appearance: She is well-developed  She is not diaphoretic  HENT:      Head: Normocephalic and atraumatic  Right Ear: External ear normal       Left Ear: External ear normal       Nose: Nose normal    Eyes:      General: Lids are normal  No scleral icterus  Cardiovascular:      Rate and Rhythm: Normal rate and regular rhythm  Heart sounds: Normal heart sounds  No murmur heard  No friction rub  No gallop  Pulmonary:      Effort: Pulmonary effort is normal  No respiratory distress  Breath sounds: Normal breath sounds  No wheezing or rales  Abdominal:      Palpations: Abdomen is soft  Tenderness: There is no abdominal tenderness  There is no guarding or rebound  Musculoskeletal:         General: No deformity  Normal range of motion  Cervical back: Normal range of motion and neck supple  Feet:    Skin:     General: Skin is warm and dry  Neurological:      General: No focal deficit present  Mental Status: She is alert     Psychiatric:         Mood and Affect: Mood normal          Behavior: Behavior normal              ED Medications  Medications - No data to display    Diagnostic Studies  Results Reviewed     Procedure Component Value Units Date/Time    Basic metabolic panel [[id number]]  (Abnormal) Collected: 06/10/23 0326    Lab Status: Final result Specimen: Blood from Arm, Left Updated: 06/10/23 0350     Sodium 139 mmol/L      Potassium 3 4 mmol/L      Chloride 108 mmol/L      CO2 28 mmol/L      ANION GAP 3 mmol/L      BUN 17 mg/dL      Creatinine 1 05 mg/dL      Glucose 154 mg/dL      Calcium 9 8 mg/dL      eGFR 56 ml/min/1 73sq m     Narrative:      [place] guidelines for Chronic Kidney Disease (CKD):   •  Stage 1 with normal or high GFR (GFR > 90 mL/min/1 73 square meters)  •  Stage 2 Mild CKD (GFR = 60-89 mL/min/1 73 square meters)  •  Stage 3A Moderate CKD (GFR = 45-59 mL/min/1 73 square meters)  •  Stage 3B Moderate CKD (GFR = 30-44 mL/min/1 73 square meters)  •  Stage 4 Severe CKD (GFR = 15-29 mL/min/1 73 square meters)  •  Stage 5 End Stage CKD (GFR <15 mL/min/1 73 square meters)  Note: GFR calculation is accurate only with a steady state creatinine    CBC and differential [[id number]]  (Abnormal) Collected: 06/10/23 0326    Lab Status: Final result Specimen: Blood from Arm, Left Updated: 06/10/23 0339     WBC 11 01 Thousand/uL      RBC 5 16 Million/uL      Hemoglobin 12 1 g/dL      Hematocrit 40 3 %      MCV 78 fL      MCH 23 4 pg      MCHC 30 0 g/dL      RDW 19 7 %      MPV 10 4 fL      Platelets 600 Thousands/uL      nRBC 0 /100 WBCs      Neutrophils Relative 55 %      Immat GRANS % 0 %      Lymphocytes Relative 34 %      Monocytes Relative 7 %      Eosinophils Relative 3 %      Basophils Relative 1 %      Neutrophils Absolute 6 01 Thousands/µL      Immature Grans Absolute 0 04 Thousand/uL      Lymphocytes Absolute 3 74 Thousands/µL      Monocytes Absolute 0 79 Thousand/µL      Eosinophils Absolute 0 36 Thousand/µL      Basophils Absolute 0 07 Thousands/µL                  No orders to display         Procedures  Procedures      ED Course  ED Course as of 06/10/23 0649   Sat Grady 10, 2023   0405 Patient reevaluated  Resting comfortably  Discussed results and findings  Explained no emergent causes of her symptoms  As there is no indication for further work-up or treatment in the emergency department at this time will discharge  Recommended PCP follow-up  Return precautions discussed  Patient verbalized understanding and agreement plan of care                                         Medical Decision Making  Patient is a 57 y o  female who presents to the ED for generalized weakness and "fatigue  Patient is nontoxic, well-appearing  She is hypertensive but otherwise vitals are stable  Physical exam is unremarkable  Unclear etiology of patient's symptoms  Differential includes electrolyte abnormalities, anemia, STEFFI  Presentation not consistent with ACS  Plan: Labs, reassessment                  Portions of the record may have been created with voice recognition software  Occasional wrong word or \"sound a like\" substitutions may have occurred due to the inherent limitations of voice recognition software  Read the chart carefully and recognize, using context, where substitutions have occurred  Fatigue: acute illness or injury  Amount and/or Complexity of Data Reviewed  Labs: ordered  Disposition  Final diagnoses:   Fatigue     Time reflects when diagnosis was documented in both MDM as applicable and the Disposition within this note     Time User Action Codes Description Comment    6/10/2023  4:05 AM Allan Martinez Add [R53 [id number]] Fatigue       ED Disposition     ED Disposition   Discharge    Condition   Stable    Date/Time   Sat Grady 10, 2023  4:05 AM    Comment   Tammy Valdovinos discharge to home/self care                 Follow-up Information     Follow up With Specialties Details Why Contact Info Additional Information    Abebe Gilbert MD [facility] Medicine   [address]  [address] [facility]  49  [facility]        [address] [place] [address] Emergency Department Emergency Medicine  As needed [address] [id number]  [address] [facility] 64 [facility] Emergency Department, [address], [address]          Discharge Medication List as of 6/10/2023  4:24 AM      CONTINUE these medications which have NOT CHANGED    Details   acetaminophen (TYLENOL) 325 mg tablet Take 650 mg by mouth every 6 (six) hours as needed for mild pain, Historical Med      amLODIPine (NORVASC) 5 mg tablet Take 1 tablet (5 " mg total) by mouth daily, Starting Tue 5/30/2023, Normal      aspirin (ECOTRIN LOW STRENGTH) 81 mg EC tablet Take 1 tablet (81 mg total) by mouth daily, Starting Tue 5/30/2023, Normal      atorvastatin (LIPITOR) 40 mg tablet Take 1 tablet (40 mg total) by mouth daily, Starting Tue 5/30/2023, Normal      Blood Glucose Monitoring Suppl (OneTouch Verio Reflect) w/Device KIT Use 1 each 4 (four) times a day, Starting Mon 5/22/2023, Normal      Blood Pressure Monitor KIT Check blood pressures BID, Normal      budesonide-formoterol (Symbicort) 160-4 5 mcg/act inhaler Inhale 1 puff 2 (two) times a day Rinse mouth after use , Starting Tue 5/30/2023, Normal      cholecalciferol (VITAMIN D3) 1,000 units tablet Take 2 tablets (2,000 Units total) by mouth daily, Starting Mon 5/22/2023, Normal      Continuous Blood Gluc  (FreeStyle [place] 2 [place]) ERIC Use 1 each 4 (four) times a day, Starting Fri 2/17/2023, Normal      Continuous Blood Gluc Sensor (FreeStyle Denisa 2 Sensor) MISC Use 1 each every 14 (fourteen) days, Starting Fri 2/17/2023, Normal      docusate sodium (COLACE) 100 mg capsule Take 1 capsule (100 mg total) by mouth 2 (two) times a day, Starting Fri 4/7/2023, Until Sun 5/7/2023, Normal      Emollient (eucerin) lotion Apply 1 application  topically 2 (two) times a day = to B/L LE, Starting Fri 4/7/2023, Until Sun 5/7/2023, Normal      furosemide (LASIX) 20 mg tablet Take 1 tablet (20 mg total) by mouth 2 (two) times a day, Starting Tue 5/30/2023, Normal      gabapentin (NEURONTIN) 100 mg capsule Take 1 capsule (100 mg total) by mouth 2 (two) times a day, Starting Tue 5/30/2023, Normal      glucose blood (OneTouch Verio) test strip Check blood sugars four times daily  Please substitute with appropriate alternative as covered by patient's insurance   Dx: E11 65, Normal      hydrALAZINE (APRESOLINE) 10 mg tablet Take 1 tablet (10 mg total) by mouth 3 (three) times a day, Starting Tue 5/30/2023, Normal      insulin glargine (LANTUS) 100 units/mL subcutaneous injection Inject 25 Units under the skin every 12 (twelve) hours, Starting Tue 5/30/2023, Until Thu 6/29/2023, Normal      !! insulin lispro (HumaLOG) 100 units/mL injection Inject under the skin Sliding scale  Inject as per sliding scale:  if 0 - 150 = 0 unit;  151 - 200 = 2 units;  201 - 250 = 4 units;  251 - 300 = 6 units;  301 - 350 = 8 units;  351 - 400 = 10 units less than 70 or above 400 notifhugh SIMENTAL,  subcutaneously bef ore meals and at bedtime for DM, Historical Med      !! insulin lispro (HumaLOG) 100 units/mL injection Inject 5 Units under the skin 3 (three) times a day with meals, Starting Tue 5/30/2023, Until Thu 6/29/2023, Normal      !! Insulin Pen Needle (BD Pen Needle Tita 2nd Gen) 32G X 4 MM MISC For use with insulin pen  Pharmacy may dispense brand covered by insurance , Normal      !!  Insulin Pen Needle (BD Pen Needle Tita U/F) 32G X 4 MM MISC Use four times daily as directed with insulin pen, Normal      Lancets (OneTouch Delica Plus [id number]) MISC Use 1 each 4 (four) times a day, Starting Mon 5/22/2023, Normal      loperamide (IMODIUM) 2 mg capsule Take 1 capsule (2 mg total) by mouth 4 (four) times a day as needed for diarrhea (can have up to 8mg per day), Starting Wed 3/8/2023, Normal      losartan (COZAAR) 100 MG tablet Take 1 tablet (100 mg total) by mouth daily, Starting Tue 5/30/2023, Normal      metFORMIN (GLUCOPHAGE) 1000 MG tablet Take 1 tablet (1,000 mg total) by mouth 2 (two) times a day with meals, Starting Tue 5/30/2023, Normal      metoprolol tartrate (LOPRESSOR) 100 mg tablet Take 1 tablet (100 mg total) by mouth every 12 (twelve) hours, Starting Tue 5/30/2023, Normal      multivitamin-iron-minerals-folic acid (THERAPEUTIC-M) TABS tablet Take 1 tablet by mouth daily, Starting Mon 5/22/2023, Normal      Nutritional Supplements (ProSource No Carb) LIQD Take 30 mL by mouth 2 (two) times a day, Historical Med      Petrolatum-Zinc Oxide 57-17 % PSTE Apply 1 application  topically 3 (three) times a day To sacral area - protective, Historical Med      predniSONE 1 mg tablet Take 4 tablets (4 mg total) by mouth daily, Starting Tue 5/30/2023, Normal      topiramate (TOPAMAX) 100 mg tablet Take 1 tablet (100 mg total) by mouth daily at bedtime, Starting Tue 5/30/2023, Normal       !! - Potential duplicate medications found  Please discuss with provider  No discharge procedures on file  PDMP Review       Value Time User    PDMP Reviewed  Yes 4/12/2023  1:16 AM Jennyfer Solis PA-C           ED Provider  Attending physically available and evaluated Wilman Chandra I managed the patient along with the ED Attending      Electronically Signed by         Renee Carrasco DO  06/10/23 6195

## 2023-06-10 NOTE — ED ATTENDING ATTESTATION
6/10/2023  IChung MD, saw and evaluated the patient  I have discussed the patient with the resident/non-physician practitioner and agree with the resident's/non-physician practitioner's findings, Plan of Care, and MDM as documented in the resident's/non-physician practitioner's note, except where noted  All available labs and Radiology studies were reviewed  I was present for key portions of any procedure(s) performed by the resident/non-physician practitioner and I was immediately available to provide assistance  At this point I agree with the current assessment done in the Emergency Department  I have conducted an independent evaluation of this patient a history and physical is as follows:    ED Course     64-year-old female, history of insulin-dependent diabetes, polymyalgia rheumatica, diabetic retinopathy, presenting to the emergency department for evaluation of generalized fatigue/weakness  Patient states gradual onset over the course of the day  Patient states that she is currently homeless and states that [place] a place [place] the [place] where people [place] allowed to lay down to sleep  She had gone there to rest, and had complained of generalized fatigue prompting EMS to be called to bring her to the emergency department  Patient reportedly felt intermittently warm and cold  No reported fever  Patient reports gradual onset headache which is consistent with her usual migraines  No chest pain, cough, shortness of breath, abdominal pain, nausea, vomiting  Patient states the lower extremity edema is her baseline  The patient is resting comfortably on a stretcher in no acute respiratory distress  The patient appears nontoxic  HEENT reveals moist mucous membranes  Head is normocephalic and atraumatic  Conjunctiva and sclera are normal  Neck is nontender and supple with full range of motion to flexion, extension, lateral rotation  No meningismus appreciated  No masses are appreciated  Lungs are clear to auscultation bilaterally without any wheezes, rales or rhonchi  Heart is regular rate and rhythm without any murmurs, rubs or gallops  Abdomen is soft and nontender without any rebound or guarding  Extremities appear grossly normal without any significant arthropathy  Bilateral lower extremity edema is noted  The patient has a ulcer over the left posterior heel that does not appear infected  Patient is awake, alert, and oriented x3  The patient has normal interaction  Cranial nerves grossly intact  Motor is 5 out of 5 bilateral upper and lower extremities  MEDICAL DECISION MAKING    Number and Complexity of Problems  • Differential diagnosis: Hyponatremia, hypernatremia, hypokalemia, acute kidney injury, anemia  Hyperglycemia and complications of diabetes  Medical Decision Making Data  • External documents reviewed: Reviewed rheumatology note from February 2022  Reviewed multiple ER visits including 1/13/2023, 2/9/2023, 2/14/2023  Patient had hospitalizations that were reviewed on 2/11/2023 and 2/28/2023  I also reviewed most recent hospitalization on 5/28/2023  • My EKG interpretation: Normal sinus rhythm at 90 bpm   No acute ST or T wave changes      No orders to display       Labs Reviewed   CBC AND DIFFERENTIAL - Abnormal       Result Value Ref Range Status    WBC 11 01 (*) 4 31 - 10 16 Thousand/uL Final    RBC 5 16 (*) 3 81 - 5 12 Million/uL Final    Hemoglobin 12 1  11 5 - 15 4 g/dL Final    Hematocrit 40 3  34 8 - 46 1 % Final    MCV 78 (*) 82 - 98 fL Final    MCH 23 4 (*) 26 8 - 34 3 pg Final    MCHC 30 0 (*) 31 4 - 37 4 g/dL Final    RDW 19 7 (*) 11 6 - 15 1 % Final    MPV 10 4  8 9 - 12 7 fL Final    Platelets 743 (*) 091 - 390 Thousands/uL Final    nRBC 0  /100 WBCs Final    Neutrophils Relative 55  43 - 75 % Final    Immat GRANS % 0  0 - 2 % Final    Lymphocytes Relative 34  14 - 44 % Final    Monocytes Relative 7  4 - 12 % Final    Eosinophils Relative 3  0 - 6 % Final Basophils Relative 1  0 - 1 % Final    Neutrophils Absolute 6 01  1 85 - 7 62 Thousands/µL Final    Immature Grans Absolute 0 04  0 00 - 0 20 Thousand/uL Final    Lymphocytes Absolute 3 74  0 60 - 4 47 Thousands/µL Final    Monocytes Absolute 0 79  0 17 - 1 22 Thousand/µL Final    Eosinophils Absolute 0 36  0 00 - 0 61 Thousand/µL Final    Basophils Absolute 0 07  0 00 - 0 10 Thousands/µL Final   BASIC METABOLIC PANEL - Abnormal    Sodium 139  135 - 147 mmol/L Final    Potassium 3 4 (*) 3 5 - 5 3 mmol/L Final    Chloride 108  96 - 108 mmol/L Final    CO2 28  21 - 32 mmol/L Final    ANION GAP 3 (*) 4 - 13 mmol/L Final    BUN 17  5 - 25 mg/dL Final    Creatinine 1 05  0 60 - 1 30 mg/dL Final    Comment: Standardized to IDMS reference method    Glucose 154 (*) 65 - 140 mg/dL Final    Comment: Specimen collection should occur prior to Sulfasalazine administration due to the potential for falsely depressed results  Specimen collection should occur prior to Sulfapyridine administration due to the potential for falsely elevated results  If the patient is fasting, the ADA then defines impaired fasting glucose as > 100 mg/dL and diabetes as > or equal to 123 mg/dL  Calcium 9 8  8 3 - 10 1 mg/dL Final    eGFR 56  ml/min/1 73sq m Final    Narrative:     [place] guidelines for Chronic Kidney Disease (CKD):   •  Stage 1 with normal or high GFR (GFR > 90 mL/min/1 73 square meters)  •  Stage 2 Mild CKD (GFR = 60-89 mL/min/1 73 square meters)  •  Stage 3A Moderate CKD (GFR = 45-59 mL/min/1 73 square meters)  •  Stage 3B Moderate CKD (GFR = 30-44 mL/min/1 73 square meters)  •  Stage 4 Severe CKD (GFR = 15-29 mL/min/1 73 square meters)  •  Stage 5 End Stage CKD (GFR <15 mL/min/1 73 square meters)  Note: GFR calculation is accurate only with a steady state creatinine       • Labs reviewed by me are significant for: Elevated blood glucose of 154    •   • Social determinants of health: Patient's care is complicated by the fact that the patient is homeless  • Considered admission for: Generalized weakness  Treatment and Disposition  ED course: Patient remained hemodynamically stable in the emergency department  Lab work was performed and reviewed by me demonstrating no acute abnormalities  Patient's son was present in the emergency department  Shared decision making: Patient is agreeable to being dispositioned to [facility] versus [facility] shelter  Code status: Full code              Critical Care Time  Procedures

## 2023-06-11 PROBLEM — A41.9 SEPSIS (HCC): Status: RESOLVED | Noted: 2023-04-12 | Resolved: 2023-06-11

## 2023-06-12 ENCOUNTER — PATIENT OUTREACH (OUTPATIENT)
Dept: FAMILY MEDICINE CLINIC | Facility: CLINIC | Age: 63
End: 2023-06-12

## 2023-06-12 NOTE — PROGRESS NOTES
MITZI ONEILL received notification that pt was recently in the ED  MITZI ONEILL called pt to follow up with hr progress  MITZI ONEILL spoke to pt who stated that she was doing fine and feeling better  Pt stated that she did not have anything to eat today but she was able to drink some orange juice and will be taking her medications  Pt stated that she has been doing her wound care but expressed concerns about her feet becoming worse  MITZI ONEILL inquired where pt was and she confirm that she was at the [facility] in [place]  MITZI ONEILL inquired if pt had reached out to the rental assistance programs and drop in center and pt stated that she did not  MITZI ONEILL informed pt about the meal kitchen at [facility]  Pt expressed that she does not want to walk there and has no funding for a taxi or bus  MITZI ONEILL encouraged pt that the walk is aproximately 25 mins per [employer]  Pt stated that her feet is getting worse and she does not think the walk would be good  MITZI ONEILL inquired about if her son could go and seek assistance and pt stated that her son was hesitant to go as he felt the walk was too long as he had walked to go see her in the hospital      MITZI ONEILL expressed the importance of seeking assistance from the community based programs to help her get the help she needs to get a room rental  MITZI ONEILL confirmed with pt that she does not have the funds to pay the down payment for a rental at this time  MITZI ONEILL encouraged pt that getting connected to the community resources will help her get on the right track to getting her off the streets  Pt expressed understanding  MITZI ONEILL provided pt with the number for [facility] and pt stated that she call  MITZI ONEILL inquired about the e-mail of resources that was sent and pt stated that she did not check it but would look at it  MITZI ONEILL encouraged pt to call MITZI ONEILL if anything comes up and she needs assistance or has any questions  MITZI ONEILL will continue to be available for any additional needs as requested

## 2023-06-13 ENCOUNTER — PATIENT OUTREACH (OUTPATIENT)
Dept: FAMILY MEDICINE CLINIC | Facility: CLINIC | Age: 63
End: 2023-06-13

## 2023-06-13 NOTE — PROGRESS NOTES
Outgoing Call  06/13/2023    CMOC called Rhonda Mendoza on this day regarding L[facility] application  Rhonda Mendoza did not answer at this time  CMOC was unable to leave voicemail due mailbox is full  [facility] will continue to follow up  Next follow up was scheduled on 06/16/2023

## 2023-06-14 ENCOUNTER — TELEPHONE (OUTPATIENT)
Dept: FAMILY MEDICINE CLINIC | Facility: CLINIC | Age: 63
End: 2023-06-14

## 2023-06-14 NOTE — TELEPHONE ENCOUNTER
----- Message from [facility] sent at 6/14/2023 11:39 AM EDT -----  Regarding: APPT  Hello,    Pt above would like to reschedule an appt, can someone please call her  Thank you    first attempt to contact patient  no answer,    mb is full couldn't leave a message

## 2023-06-19 ENCOUNTER — PATIENT OUTREACH (OUTPATIENT)
Dept: FAMILY MEDICINE CLINIC | Facility: CLINIC | Age: 63
End: 2023-06-19

## 2023-06-19 NOTE — PROGRESS NOTES
Outgoing Call  06/19/2023    CMOC called Сергей Haro on this day regarding [employer]  Cloretta Pellet did not answer at this time  CMOC was unable to leave voicemail due mailbox is full  [facility] sent text/voice message asking Cloretta Pellet to call and schedule meeting to complete [facility] application  [facility] will continue to follow up  Next follow up was scheduled on 06/26/2023

## 2023-06-19 NOTE — PROGRESS NOTES
MITZI ONEILL attempted to call pt to follow up on her status  MITZI ONEILL called pt twice, however, the call went to voicemail  MITZI ONEILL was unable to leave a message as the mailbox was full  MITZI ONEILL will continue to be available for any additional needs as requested

## 2023-06-20 ENCOUNTER — PATIENT OUTREACH (OUTPATIENT)
Dept: FAMILY MEDICINE CLINIC | Facility: CLINIC | Age: 63
End: 2023-06-20

## 2023-06-20 NOTE — PROGRESS NOTES
I called the patient but received her voicemail  Message was left asking for a return call  Since the patient is homeless I cannot send the UTR letter  Case is being closed at this time

## 2023-06-21 ENCOUNTER — TELEPHONE (OUTPATIENT)
Dept: FAMILY MEDICINE CLINIC | Facility: CLINIC | Age: 63
End: 2023-06-21

## 2023-06-21 NOTE — TELEPHONE ENCOUNTER
----- Message from Tasia sent at 6/15/2023 10:50 AM EDT -----  Regarding: FW: APPT    ----- Message -----  From: Jorgito Ruelas  Sent: 6/14/2023  11:40 AM EDT  To: Jorgito Ruelas; Flower Andersen; #  Subject: APPT                                             Hello,    Pt above would like to reschedule an appt, can someone please call her      Thank you

## 2023-06-21 NOTE — TELEPHONE ENCOUNTER
06/21/23    first attempt to contact patient  no answer    Left detailed message  If Pt contacts office, please assist Pt with scheduling an appt  Please ask Pt the reason for the Appt  Thank You

## 2023-06-23 ENCOUNTER — TELEPHONE (OUTPATIENT)
Dept: FAMILY MEDICINE CLINIC | Facility: CLINIC | Age: 63
End: 2023-06-23

## 2023-06-23 NOTE — TELEPHONE ENCOUNTER
----- Message from [address] sent at 6/14/2023 11:39 AM EDT -----  Regarding: APPT  Hello,    Pt above would like to reschedule an appt, can someone please call her.     Thank you

## 2023-06-26 ENCOUNTER — PATIENT OUTREACH (OUTPATIENT)
Dept: FAMILY MEDICINE CLINIC | Facility: CLINIC | Age: 63
End: 2023-06-26

## 2023-06-26 NOTE — PROGRESS NOTES
Outgoing Call  06/26/2023    [facility] called Tobias Kam on this day regarding Housing application  Tobias Kam did not answer at this time  CMOC was unable to leave voicemail  Per Chart Review, CMOC had called and sent text messages multiple of time and Tobias Kam have not return calls  CMOC is unable to send contact letter due Tobias Kam is Homeless  [facility] will closed on my behalf this referral received from Jessica Castillo to assist with Housing applications today 29/65/7038 due unable to contact patient  No follow up was scheduled at this time

## 2023-06-27 ENCOUNTER — PATIENT OUTREACH (OUTPATIENT)
Dept: FAMILY MEDICINE CLINIC | Facility: CLINIC | Age: 63
End: 2023-06-27

## 2023-06-27 NOTE — PROGRESS NOTES
MITZI ONEILL called pt to follow up with her status at this time, however, the call went to voicemail  MITZI ONEILL left a message requesting a return call  MITZI ONEILL will continue to be available for any additional needs as requested

## 2023-07-03 ENCOUNTER — TELEPHONE (OUTPATIENT)
Dept: FAMILY MEDICINE CLINIC | Facility: CLINIC | Age: 63
End: 2023-07-03

## 2023-07-03 ENCOUNTER — PATIENT OUTREACH (OUTPATIENT)
Dept: FAMILY MEDICINE CLINIC | Facility: CLINIC | Age: 63
End: 2023-07-03

## 2023-07-03 NOTE — PROGRESS NOTES
MITZI ONEILL made another outreach attempt  to follow up on her progress. The call went to [employer]. MITZI ONEILL left a message requesting a return phone call. MITZI ONEILL unable to send unable to reach letter as pt does not have a physical address. MITZI ONELIL sent unable to reach letter to pt's MyChart as pt is showing as having an active MyChart, however, last noted login was in 9/4/22. MITZI ONEILL will close referral due to not being able to get in contact with the pt. MITZI ONEILL will be available for any additional needs as requested.

## 2023-07-03 NOTE — LETTER
[address], [address] [facility]  [address] [facility]    Re: Social Work   7/3/2023       Dear Renee Garcia,    We tried to reach you by phone on 7/3/2023 and was unfortunately unable to reach you. It is important that you contact the [address] [place] [address] as soon as possible at: [phone].     Sincerely,         Indira Gordillo

## 2023-07-03 NOTE — TELEPHONE ENCOUNTER
07/03/23    first attempt to contact patient.  no answer    Left message       If Pt contact office, please assist pt with rescheduling missed 06/30/23 appt

## 2023-07-21 ENCOUNTER — OFFICE VISIT (OUTPATIENT)
Dept: FAMILY MEDICINE CLINIC | Facility: CLINIC | Age: 63
End: 2023-07-21

## 2023-07-21 ENCOUNTER — PATIENT OUTREACH (OUTPATIENT)
Dept: FAMILY MEDICINE CLINIC | Facility: CLINIC | Age: 63
End: 2023-07-21

## 2023-07-21 VITALS
SYSTOLIC BLOOD PRESSURE: 152 MMHG | HEART RATE: 85 BPM | BODY MASS INDEX: 43.64 KG/M2 | OXYGEN SATURATION: 97 % | HEIGHT: 68 IN | TEMPERATURE: 97.4 F | DIASTOLIC BLOOD PRESSURE: 94 MMHG | RESPIRATION RATE: 18 BRPM

## 2023-07-21 DIAGNOSIS — J45.21 MILD INTERMITTENT ASTHMA WITH EXACERBATION: ICD-10-CM

## 2023-07-21 DIAGNOSIS — E11.42 DIABETIC POLYNEUROPATHY ASSOCIATED WITH TYPE 2 DIABETES MELLITUS (HCC): ICD-10-CM

## 2023-07-21 DIAGNOSIS — R26.2 AMBULATORY DYSFUNCTION: ICD-10-CM

## 2023-07-21 DIAGNOSIS — R56.9 SEIZURES (HCC): ICD-10-CM

## 2023-07-21 DIAGNOSIS — Z59.41 FOOD INSECURITY: ICD-10-CM

## 2023-07-21 DIAGNOSIS — D50.8 IRON DEFICIENCY ANEMIA SECONDARY TO INADEQUATE DIETARY IRON INTAKE: ICD-10-CM

## 2023-07-21 DIAGNOSIS — E11.9 ENCOUNTER FOR DIABETIC FOOT EXAM (HCC): ICD-10-CM

## 2023-07-21 DIAGNOSIS — Z59.00 LACK OF HOUSING: Primary | ICD-10-CM

## 2023-07-21 DIAGNOSIS — E11.65 TYPE 2 DIABETES MELLITUS WITH HYPERGLYCEMIA, WITH LONG-TERM CURRENT USE OF INSULIN (HCC): Primary | ICD-10-CM

## 2023-07-21 DIAGNOSIS — Z59.02 UNSHELTERED HOMELESSNESS: ICD-10-CM

## 2023-07-21 DIAGNOSIS — R09.89 DECREASED PULSES IN FEET: ICD-10-CM

## 2023-07-21 DIAGNOSIS — E11.9 DIABETES MELLITUS (HCC): ICD-10-CM

## 2023-07-21 DIAGNOSIS — I10 PRIMARY HYPERTENSION: ICD-10-CM

## 2023-07-21 DIAGNOSIS — E78.49 OTHER HYPERLIPIDEMIA: ICD-10-CM

## 2023-07-21 DIAGNOSIS — Z79.4 TYPE 2 DIABETES MELLITUS WITH HYPERGLYCEMIA, WITH LONG-TERM CURRENT USE OF INSULIN (HCC): Primary | ICD-10-CM

## 2023-07-21 LAB
DME PARACHUTE DELIVERY DATE REQUESTED: NORMAL
DME PARACHUTE ITEM DESCRIPTION: NORMAL
DME PARACHUTE ORDER STATUS: NORMAL
DME PARACHUTE SUPPLIER NAME: NORMAL
DME PARACHUTE SUPPLIER PHONE: NORMAL
SL AMB POCT HEMOGLOBIN AIC: 10.2 (ref ?–6.5)

## 2023-07-21 PROCEDURE — 83036 HEMOGLOBIN GLYCOSYLATED A1C: CPT | Performed by: FAMILY MEDICINE

## 2023-07-21 PROCEDURE — 99214 OFFICE O/P EST MOD 30 MIN: CPT | Performed by: FAMILY MEDICINE

## 2023-07-21 RX ORDER — INSULIN LISPRO 100 [IU]/ML
5 INJECTION, SOLUTION INTRAVENOUS; SUBCUTANEOUS
Qty: 4.5 ML | Refills: 0 | Status: SHIPPED | OUTPATIENT
Start: 2023-07-21 | End: 2023-08-20

## 2023-07-21 RX ORDER — METOPROLOL TARTRATE 100 MG/1
100 TABLET ORAL EVERY 12 HOURS SCHEDULED
Qty: 60 TABLET | Refills: 3 | Status: SHIPPED | OUTPATIENT
Start: 2023-07-21

## 2023-07-21 RX ORDER — INSULIN GLARGINE 100 [IU]/ML
25 INJECTION, SOLUTION SUBCUTANEOUS EVERY 12 HOURS SCHEDULED
Qty: 15 ML | Refills: 3 | Status: SHIPPED | OUTPATIENT
Start: 2023-07-21 | End: 2023-11-18

## 2023-07-21 RX ORDER — ATORVASTATIN CALCIUM 40 MG/1
40 TABLET, FILM COATED ORAL DAILY
Qty: 90 TABLET | Refills: 3 | Status: SHIPPED | OUTPATIENT
Start: 2023-07-21

## 2023-07-21 RX ORDER — LOSARTAN POTASSIUM 100 MG/1
100 TABLET ORAL DAILY
Qty: 30 TABLET | Refills: 5 | Status: SHIPPED | OUTPATIENT
Start: 2023-07-21

## 2023-07-21 RX ORDER — BUDESONIDE AND FORMOTEROL FUMARATE DIHYDRATE 160; 4.5 UG/1; UG/1
1 AEROSOL RESPIRATORY (INHALATION) 2 TIMES DAILY
Qty: 10.2 G | Refills: 3 | Status: SHIPPED | OUTPATIENT
Start: 2023-07-21

## 2023-07-21 RX ORDER — NICOTINE POLACRILEX 2 MG
1 LOZENGE BUCCAL DAILY
Qty: 30 TABLET | Refills: 2 | Status: SHIPPED | OUTPATIENT
Start: 2023-07-21

## 2023-07-21 RX ORDER — HYDRALAZINE HYDROCHLORIDE 10 MG/1
10 TABLET, FILM COATED ORAL 3 TIMES DAILY
Qty: 90 TABLET | Refills: 3 | Status: SHIPPED | OUTPATIENT
Start: 2023-07-21

## 2023-07-21 RX ORDER — FUROSEMIDE 20 MG/1
20 TABLET ORAL 2 TIMES DAILY
Qty: 60 TABLET | Refills: 3 | Status: SHIPPED | OUTPATIENT
Start: 2023-07-21

## 2023-07-21 RX ORDER — GABAPENTIN 100 MG/1
100 CAPSULE ORAL 2 TIMES DAILY
Qty: 60 CAPSULE | Refills: 3 | Status: SHIPPED | OUTPATIENT
Start: 2023-07-21

## 2023-07-21 RX ORDER — AMLODIPINE BESYLATE 5 MG/1
5 TABLET ORAL DAILY
Qty: 30 TABLET | Refills: 3 | Status: SHIPPED | OUTPATIENT
Start: 2023-07-21

## 2023-07-21 SDOH — ECONOMIC STABILITY - FOOD INSECURITY: FOOD INSECURITY: Z59.41

## 2023-07-21 SDOH — ECONOMIC STABILITY - HOUSING INSECURITY: HOMELESSNESS UNSPECIFIED: Z59.00

## 2023-07-21 SDOH — ECONOMIC STABILITY - HOUSING INSECURITY: UNSHELTERED HOMELESSNESS: Z59.02

## 2023-07-21 NOTE — ASSESSMENT & PLAN NOTE
Order for walker placed  Given patient is currently homeless, walker will be delivered to our office and we will let patient know to come pick it up  Referred to [address] [facility] wheelchair clinic for motorized wheelchair eval

## 2023-07-21 NOTE — PROGRESS NOTES
MITZI ONEILL received In person consult from Provider, Dr. Davis [facility] regarding pt still homeless and is asking for resources for rental assistance and SNAP benefit. Per chart review MITZI ONEILL [employer] and [address], [address] were following up with and trying to assist pt apply for housing but case was closed due to lack of response from the pt. MITZI ONEILL met with pt and pt's son. Introduced self. Pt reports they still homeless and sleeping in the street. Pt reports they were ask to leave from the casino in [facility] and now they are in [facility]. Pt expressed frustration with current situation, pt states they still have not been able to find an apartment or a place to stay. Pt states stay she has lost a lot of weight as she is having a poor diet and not eating much. Pt states she don't have SNAP benefit and she has not been able to reapply as they don't have an address. MITZI ONEILL informs pt she can go to [employer] and uses their address. Informs pt she can also apply for rental assistance through [employer] or [employer]. Informs pt they would be responsible for looking for their own apartment if they get approved for the rental assistance. Informs pt CMOC try to contact her to assist her apply for Housing they she was unable to contact her. Pt states her phone is working but she turn it off during the day to not waste the battery. MITZI ONEILL informs she could go to [employer] or [facility] during the day, she could charge her phone and also gets food. Pt states she wants to get her own place so she could start making her own meals. MITZI ONEILL provided emotional support and active listening and encourage to try to apply for rental assistance and [facility] could try to meet with her to apply for housing. Reminded pt she can only apply for housing for herself as her son do not meet criteria to apply for housing. Informs if she find housing, she can apply for [employer] and her son could be her pays caregiver.  Pt verbalized understanding. Pt reports she has been legally blind for few years and she qualify for services. Pt states she has not been connect it with resources for blind individuals. MITZI ONEILL provided resources for  [facility] for [address] [place] [address] Sebastian for [employer] . Informs pt [facility] can provide case management and they could also help her apply for housing and SNAP benefit. Informs [facility] also provide transportation services to medical appointments. Pt states she will call them. Informs a [facility] referral would be place to assist her but if the [facility] cannot contact her they referral would be closed again. Pt verbalized understanding. Pt requested a cane, pt states she has a wheelchair but she would like a walker. Spoke with the Provider and she placed a script and the walker would be delivered at the practice. Patient was referred on Findhelp to     MITZI ONEILL provided the [employer] resources for rental assistance. Provided list with [employer], [employer] and [employer] information for day center, food and shower services. Provided pt with [facility] information. MITZI ONEILL will remains available and will continue to follow-up.

## 2023-07-21 NOTE — ASSESSMENT & PLAN NOTE
Lab Results   Component Value Date    HGBA1C 10.2 (A) 07/21/2023   Improved compared to prior  Reviewed low carb diet, however patient states she currently eats what she cant get and can not afford to follow any particular diet  Insulins refilled: Lantus 25 units BID and HumaLog 5 units with each meal

## 2023-07-21 NOTE — PROGRESS NOTES
Name: Mihaela Gomez      : 1960      MRN: [id number]  Encounter Provider: Jose L Aponte MD  Encounter Date: 2023   Encounter department: [facility]     1. Type 2 diabetes mellitus with hyperglycemia, with long-term current use of insulin (HCC)  Assessment & Plan:    Lab Results   Component Value Date    HGBA1C 10.2 (A) 2023   Improved compared to prior  Reviewed low carb diet, however patient states she currently eats what she cant get and can not afford to follow any particular diet  Insulins refilled: Lantus 25 units BID and HumaLog 5 units with each meal       Orders:  -     POCT hemoglobin A1c  -     metFORMIN (GLUCOPHAGE) 1000 MG tablet; Take 1 tablet (1,000 mg total) by mouth 2 (two) times a day with meals  -     Ambulatory Referral to Podiatry; Future    2. Other hyperlipidemia  -     atorvastatin (LIPITOR) 40 mg tablet; Take 1 tablet (40 mg total) by mouth daily    3. Iron deficiency anemia secondary to inadequate dietary iron intake  -     multivitamin-iron-minerals-folic acid (THERAPEUTIC-M) TABS tablet; Take 1 tablet by mouth daily    4. Mild intermittent asthma with exacerbation  -     budesonide-formoterol (Symbicort) 160-4.5 mcg/act inhaler; Inhale 1 puff 2 (two) times a day Rinse mouth after use. 5. Primary hypertension  -     amLODIPine (NORVASC) 5 mg tablet; Take 1 tablet (5 mg total) by mouth daily  -     losartan (COZAAR) 100 MG tablet; Take 1 tablet (100 mg total) by mouth daily  -     hydrALAZINE (APRESOLINE) 10 mg tablet; Take 1 tablet (10 mg total) by mouth 3 (three) times a day  -     furosemide (LASIX) 20 mg tablet; Take 1 tablet (20 mg total) by mouth 2 (two) times a day  -     metoprolol tartrate (LOPRESSOR) 100 mg tablet; Take 1 tablet (100 mg total) by mouth every 12 (twelve) hours    6. Seizures (HCC)  -     gabapentin (NEURONTIN) 100 mg capsule;  Take 1 capsule (100 mg total) by mouth 2 (two) times a day    7. Ambulatory dysfunction  Assessment & Plan:  Order for walker placed  Given patient is currently homeless, walker will be delivered to our office and we will let patient know to come pick it up  Referred to [facility] wheelchair clinic for motorized wheelchair eval     Orders:  -     Ambulatory Referral to Physical Therapy; Future    8. Diabetic polyneuropathy associated with type 2 diabetes mellitus ([address])  -     Ambulatory Referral to Podiatry; Future    9. Unsheltered homelessness    10. Decreased pulses in feet    11. Diabetes mellitus (HCC)  -     insulin glargine (LANTUS) 100 units/mL subcutaneous injection; Inject 25 Units under the skin every 12 (twelve) hours  -     insulin lispro (HumaLOG) 100 units/mL injection; Inject 5 Units under the skin 3 (three) times a day with meals    12. Encounter for diabetic foot exam [facility])  -     Ambulatory Referral to Podiatry; Future           Subjective      56 yo female with uncontrolled DM, HTN, hyperlipidemia, ambulatory dysfunction here today for follow up  Patient is very upset saying she is tired of being a number and that none cares about her. She states she needs help getting a place to live, she wants to know if she qualifies for rental assistance, how she can get a motorized wheelchair, requesting a walker, how she can get snap benefits, if she can gt meals on wheels. States we should have a copy of her insurance card. Does not check BG  States she eats what she can and does not follow any particualr diet        Review of Systems   Constitutional: Positive for fatigue. Musculoskeletal: Positive for arthralgias, back pain and gait problem. Psychiatric/Behavioral: The patient is nervous/anxious. All other systems reviewed and are negative.       Current Outpatient Medications on File Prior to Visit   Medication Sig   • acetaminophen (TYLENOL) 325 mg tablet Take 650 mg by mouth every 6 (six) hours as needed for mild pain   • aspirin (ECOTRIN LOW STRENGTH) 81 mg EC tablet Take 1 tablet (81 mg total) by mouth daily   • Blood Glucose Monitoring Suppl (OneTouch Verio Reflect) w/Device KIT Use 1 each 4 (four) times a day   • Blood Pressure Monitor KIT Check blood pressures BID   • cholecalciferol (VITAMIN D3) 1,000 units tablet Take 2 tablets (2,000 Units total) by mouth daily   • Continuous Blood Gluc  (FreeStyle [place] 2 [place]) ERIC Use 1 each 4 (four) times a day   • Continuous Blood Gluc Sensor (FreeStyle [place] 2 Sensor) MISC Use 1 each every 14 (fourteen) days   • docusate sodium (COLACE) 100 mg capsule Take 1 capsule (100 mg total) by mouth 2 (two) times a day   • Emollient (eucerin) lotion Apply 1 application. topically 2 (two) times a day = to B/L LE   • glucose blood (OneTouch Verio) test strip Check blood sugars four times daily. Please substitute with appropriate alternative as covered by patient's insurance. Dx: E11.65   • Insulin Pen Needle (BD Pen Needle Tita 2nd Gen) 32G X 4 MM MISC For use with insulin pen. Pharmacy may dispense brand covered by insurance. • Insulin Pen Needle (BD Pen Needle Tita U/F) 32G X 4 MM MISC Use four times daily as directed with insulin pen   • Lancets (OneTouch Delica Plus [id number]) MISC Use 1 each 4 (four) times a day   • loperamide (IMODIUM) 2 mg capsule Take 1 capsule (2 mg total) by mouth 4 (four) times a day as needed for diarrhea (can have up to 8mg per day)   • Nutritional Supplements (ProSource No Carb) LIQD Take 30 mL by mouth 2 (two) times a day   • Petrolatum-Zinc Oxide 57-17 % PSTE Apply 1 application.  topically 3 (three) times a day To sacral area - protective   • predniSONE 1 mg tablet Take 4 tablets (4 mg total) by mouth daily   • topiramate (TOPAMAX) 100 mg tablet Take 1 tablet (100 mg total) by mouth daily at bedtime   • [DISCONTINUED] amLODIPine (NORVASC) 5 mg tablet Take 1 tablet (5 mg total) by mouth daily   • [DISCONTINUED] atorvastatin (LIPITOR) 40 mg tablet Take 1 tablet (40 mg total) by mouth daily   • [DISCONTINUED] budesonide-formoterol (Symbicort) 160-4.5 mcg/act inhaler Inhale 1 puff 2 (two) times a day Rinse mouth after use. • [DISCONTINUED] furosemide (LASIX) 20 mg tablet Take 1 tablet (20 mg total) by mouth 2 (two) times a day   • [DISCONTINUED] gabapentin (NEURONTIN) 100 mg capsule Take 1 capsule (100 mg total) by mouth 2 (two) times a day   • [DISCONTINUED] hydrALAZINE (APRESOLINE) 10 mg tablet Take 1 tablet (10 mg total) by mouth 3 (three) times a day   • [DISCONTINUED] insulin glargine (LANTUS) 100 units/mL subcutaneous injection Inject 25 Units under the skin every 12 (twelve) hours   • [DISCONTINUED] insulin lispro (HumaLOG) 100 units/mL injection Inject under the skin Sliding scale. Inject as per sliding scale:  if 0 - 150 = 0 unit;  151 - 200 = 2 units;  201 - 250 = 4 units;  251 - 300 = 6 units;  301 - 350 = 8 units;  351 - 400 = 10 units less than 70 or above 400 notify MD,  subcutaneously before meals and at bedtime for DM   • [DISCONTINUED] insulin lispro (HumaLOG) 100 units/mL injection Inject 5 Units under the skin 3 (three) times a day with meals   • [DISCONTINUED] losartan (COZAAR) 100 MG tablet Take 1 tablet (100 mg total) by mouth daily   • [DISCONTINUED] metFORMIN (GLUCOPHAGE) 1000 MG tablet Take 1 tablet (1,000 mg total) by mouth 2 (two) times a day with meals   • [DISCONTINUED] metoprolol tartrate (LOPRESSOR) 100 mg tablet Take 1 tablet (100 mg total) by mouth every 12 (twelve) hours   • [DISCONTINUED] multivitamin-iron-minerals-folic acid (THERAPEUTIC-M) TABS tablet Take 1 tablet by mouth daily       Objective     /94 (BP Location: Left arm, Patient Position: Sitting, Cuff Size: Large)   Pulse 85   Temp (!) 97.4 °F (36.3 °C) (Temporal)   Resp 18   Ht 5' 8" (1.727 m)   SpO2 97%   BMI 43.64 kg/m²     Physical Exam  Vitals and nursing note reviewed. Constitutional:       Appearance: She is well-developed. She is morbidly obese.    HENT:      Head: Normocephalic. Right Ear: External ear normal.      Left Ear: External ear normal.      Nose: Nose normal.   Eyes:      Conjunctiva/sclera: Conjunctivae normal.      Pupils: Pupils are equal, round, and reactive to light. Neck:      Thyroid: No thyromegaly. Cardiovascular:      Rate and Rhythm: Normal rate and regular rhythm. Heart sounds: Normal heart sounds. Pulmonary:      Effort: Pulmonary effort is normal.      Breath sounds: Normal breath sounds. Abdominal:      Palpations: Abdomen is soft. Tenderness: There is no abdominal tenderness. There is no guarding or rebound. Musculoskeletal:      Cervical back: Normal range of motion and neck supple. Neurological:      Mental Status: She is oriented to person, place, and time. Deep Tendon Reflexes: Reflexes are normal and symmetric.        Howie Chavez MD

## 2023-07-27 ENCOUNTER — PATIENT OUTREACH (OUTPATIENT)
Dept: FAMILY MEDICINE CLINIC | Facility: CLINIC | Age: 63
End: 2023-07-27

## 2023-07-27 NOTE — PROGRESS NOTES
Outgoing Call:  7/27/2023    Chart reviewed. [address] called pt to discuss referral for interest in applying for SNAP and housing. Unable to leave  as it was full. Was able to send an SMS notification. Did not send letter as pt is homeless. CMOC to f/u. Next outreach is scheduled for 8/1/2023.

## 2023-07-28 ENCOUNTER — VBI (OUTPATIENT)
Dept: ADMINISTRATIVE | Facility: OTHER | Age: 63
End: 2023-07-28

## 2023-07-28 NOTE — TELEPHONE ENCOUNTER
07/28/23 10:02 AM     [employer] SmartForm used to document caregap status.     Bettyann Bumpers, MA

## 2023-08-01 ENCOUNTER — PATIENT OUTREACH (OUTPATIENT)
Dept: FAMILY MEDICINE CLINIC | Facility: CLINIC | Age: 63
End: 2023-08-01

## 2023-08-01 NOTE — PROGRESS NOTES
Outgoing Call:  8/1/2023    [facility] called pt to discuss referral received for interest in applying for SNAP and housing. This is second attempt. VM is full. Unable to send letter as pt is homeless. Final outreach is scheduled for 8/4/2023.

## 2023-08-04 ENCOUNTER — PATIENT OUTREACH (OUTPATIENT)
Dept: FAMILY MEDICINE CLINIC | Facility: CLINIC | Age: 63
End: 2023-08-04

## 2023-08-04 NOTE — LETTER
08/04/23    Dear Humble Patel,    I am a Community Health Worker with [facility] [address] [place] [id number]    I  have made several attempts to call you by phone. It is important that you contact me back at [phone] so that I can assist with your care needs.      Sincerely,         [employer], [address] [facility] [address]

## 2023-08-04 NOTE — PROGRESS NOTES
Outgoing Call/ Letter:  8/4/2023    [facility] called pt to discuss referral received for interest in applying for SNAP and housing benefits. This is third outreach and unable to speak with pt. Call goes directly to  and mailbox is full. Homeless, pt may not get letter but [facility] letter was sent. No further outreach is scheduled.

## 2023-08-08 ENCOUNTER — HOSPITAL ENCOUNTER (INPATIENT)
Facility: HOSPITAL | Age: 63
LOS: 7 days | Discharge: NURSING, REHAB OR LONG-TERM CARE | DRG: 622 | End: 2023-08-15
Attending: EMERGENCY MEDICINE | Admitting: HOSPITALIST
Payer: COMMERCIAL

## 2023-08-08 ENCOUNTER — APPOINTMENT (EMERGENCY)
Dept: NON INVASIVE DIAGNOSTICS | Facility: HOSPITAL | Age: 63
DRG: 622 | End: 2023-08-08
Payer: COMMERCIAL

## 2023-08-08 ENCOUNTER — APPOINTMENT (EMERGENCY)
Dept: CT IMAGING | Facility: HOSPITAL | Age: 63
DRG: 622 | End: 2023-08-08
Payer: COMMERCIAL

## 2023-08-08 ENCOUNTER — APPOINTMENT (EMERGENCY)
Dept: RADIOLOGY | Facility: HOSPITAL | Age: 63
DRG: 622 | End: 2023-08-08
Payer: COMMERCIAL

## 2023-08-08 DIAGNOSIS — I26.99 BILATERAL PULMONARY EMBOLISM (HCC): Primary | ICD-10-CM

## 2023-08-08 DIAGNOSIS — E11.621 DIABETIC ULCER OF RIGHT MIDFOOT ASSOCIATED WITH TYPE 2 DIABETES MELLITUS, LIMITED TO BREAKDOWN OF SKIN (HCC): ICD-10-CM

## 2023-08-08 DIAGNOSIS — L97.411 DIABETIC ULCER OF RIGHT MIDFOOT ASSOCIATED WITH TYPE 2 DIABETES MELLITUS, LIMITED TO BREAKDOWN OF SKIN (HCC): ICD-10-CM

## 2023-08-08 DIAGNOSIS — I26.99 ACUTE PULMONARY EMBOLISM, UNSPECIFIED PULMONARY EMBOLISM TYPE, UNSPECIFIED WHETHER ACUTE COR PULMONALE PRESENT (HCC): ICD-10-CM

## 2023-08-08 DIAGNOSIS — G63 POLYNEUROPATHY ASSOCIATED WITH UNDERLYING DISEASE (HCC): ICD-10-CM

## 2023-08-08 PROBLEM — L97.509 DIABETIC FOOT ULCER (HCC): Status: ACTIVE | Noted: 2023-08-08

## 2023-08-08 LAB
2HR DELTA HS TROPONIN: 0 NG/L
ALBUMIN SERPL BCP-MCNC: 3.4 G/DL (ref 3.5–5)
ALP SERPL-CCNC: 132 U/L (ref 34–104)
ALT SERPL W P-5'-P-CCNC: 7 U/L (ref 7–52)
ANION GAP SERPL CALCULATED.3IONS-SCNC: 8 MMOL/L
APTT PPP: 160 SECONDS (ref 23–37)
APTT PPP: 36 SECONDS (ref 23–37)
AST SERPL W P-5'-P-CCNC: 10 U/L (ref 13–39)
ATRIAL RATE: 102 BPM
ATRIAL RATE: 116 BPM
BACTERIA UR QL AUTO: ABNORMAL /HPF
BASOPHILS # BLD AUTO: 0.06 THOUSANDS/ÂΜL (ref 0–0.1)
BASOPHILS NFR BLD AUTO: 1 % (ref 0–1)
BILIRUB SERPL-MCNC: 0.79 MG/DL (ref 0.2–1)
BILIRUB UR QL STRIP: NEGATIVE
BNP SERPL-MCNC: 33 PG/ML (ref 0–100)
BUN SERPL-MCNC: 19 MG/DL (ref 5–25)
CALCIUM ALBUM COR SERPL-MCNC: 9.8 MG/DL (ref 8.3–10.1)
CALCIUM SERPL-MCNC: 9.3 MG/DL (ref 8.4–10.2)
CARDIAC TROPONIN I PNL SERPL HS: 3 NG/L
CARDIAC TROPONIN I PNL SERPL HS: 3 NG/L
CHLORIDE SERPL-SCNC: 101 MMOL/L (ref 96–108)
CLARITY UR: CLEAR
CO2 SERPL-SCNC: 27 MMOL/L (ref 21–32)
COLOR UR: YELLOW
CREAT SERPL-MCNC: 0.85 MG/DL (ref 0.6–1.3)
EOSINOPHIL # BLD AUTO: 0.25 THOUSAND/ÂΜL (ref 0–0.61)
EOSINOPHIL NFR BLD AUTO: 2 % (ref 0–6)
ERYTHROCYTE [DISTWIDTH] IN BLOOD BY AUTOMATED COUNT: 16.3 % (ref 11.6–15.1)
GFR SERPL CREATININE-BSD FRML MDRD: 72 ML/MIN/1.73SQ M
GLUCOSE SERPL-MCNC: 260 MG/DL (ref 65–140)
GLUCOSE UR STRIP-MCNC: ABNORMAL MG/DL
HCT VFR BLD AUTO: 35.7 % (ref 34.8–46.1)
HGB BLD-MCNC: 10.8 G/DL (ref 11.5–15.4)
HGB UR QL STRIP.AUTO: NEGATIVE
HYALINE CASTS #/AREA URNS LPF: ABNORMAL /LPF
IMM GRANULOCYTES # BLD AUTO: 0.25 THOUSAND/UL (ref 0–0.2)
IMM GRANULOCYTES NFR BLD AUTO: 2 % (ref 0–2)
INR PPP: 1 (ref 0.84–1.19)
INR PPP: 1.13 (ref 0.84–1.19)
KETONES UR STRIP-MCNC: NEGATIVE MG/DL
LACTATE SERPL-SCNC: 1.8 MMOL/L (ref 0.5–2)
LEUKOCYTE ESTERASE UR QL STRIP: NEGATIVE
LYMPHOCYTES # BLD AUTO: 2.14 THOUSANDS/ÂΜL (ref 0.6–4.47)
LYMPHOCYTES NFR BLD AUTO: 17 % (ref 14–44)
MCH RBC QN AUTO: 23.9 PG (ref 26.8–34.3)
MCHC RBC AUTO-ENTMCNC: 30.3 G/DL (ref 31.4–37.4)
MCV RBC AUTO: 79 FL (ref 82–98)
MONOCYTES # BLD AUTO: 0.75 THOUSAND/ÂΜL (ref 0.17–1.22)
MONOCYTES NFR BLD AUTO: 6 % (ref 4–12)
MUCOUS THREADS UR QL AUTO: ABNORMAL
NEUTROPHILS # BLD AUTO: 9.12 THOUSANDS/ÂΜL (ref 1.85–7.62)
NEUTS SEG NFR BLD AUTO: 72 % (ref 43–75)
NITRITE UR QL STRIP: NEGATIVE
NON-SQ EPI CELLS URNS QL MICRO: ABNORMAL /HPF
NRBC BLD AUTO-RTO: 0 /100 WBCS
P AXIS: 59 DEGREES
P AXIS: 71 DEGREES
PH UR STRIP.AUTO: 5 [PH] (ref 4.5–8)
PLATELET # BLD AUTO: 472 THOUSANDS/UL (ref 149–390)
PMV BLD AUTO: 11.3 FL (ref 8.9–12.7)
POTASSIUM SERPL-SCNC: 3.5 MMOL/L (ref 3.5–5.3)
PR INTERVAL: 164 MS
PR INTERVAL: 166 MS
PROT SERPL-MCNC: 7.4 G/DL (ref 6.4–8.4)
PROT UR STRIP-MCNC: >=300 MG/DL
PROTHROMBIN TIME: 13.2 SECONDS (ref 11.6–14.5)
PROTHROMBIN TIME: 14.5 SECONDS (ref 11.6–14.5)
QRS AXIS: 19 DEGREES
QRS AXIS: 25 DEGREES
QRSD INTERVAL: 84 MS
QRSD INTERVAL: 88 MS
QT INTERVAL: 340 MS
QT INTERVAL: 372 MS
QTC INTERVAL: 472 MS
QTC INTERVAL: 484 MS
RBC # BLD AUTO: 4.52 MILLION/UL (ref 3.81–5.12)
RBC #/AREA URNS AUTO: ABNORMAL /HPF
SODIUM SERPL-SCNC: 136 MMOL/L (ref 135–147)
SP GR UR STRIP.AUTO: 1.02 (ref 1–1.03)
T WAVE AXIS: 66 DEGREES
T WAVE AXIS: 66 DEGREES
UROBILINOGEN UR QL STRIP.AUTO: 0.2 E.U./DL
VENTRICULAR RATE: 102 BPM
VENTRICULAR RATE: 116 BPM
WBC # BLD AUTO: 12.57 THOUSAND/UL (ref 4.31–10.16)
WBC #/AREA URNS AUTO: ABNORMAL /HPF

## 2023-08-08 PROCEDURE — 85730 THROMBOPLASTIN TIME PARTIAL: CPT | Performed by: EMERGENCY MEDICINE

## 2023-08-08 PROCEDURE — 87040 BLOOD CULTURE FOR BACTERIA: CPT

## 2023-08-08 PROCEDURE — 84484 ASSAY OF TROPONIN QUANT: CPT | Performed by: EMERGENCY MEDICINE

## 2023-08-08 PROCEDURE — 93005 ELECTROCARDIOGRAM TRACING: CPT

## 2023-08-08 PROCEDURE — 85610 PROTHROMBIN TIME: CPT | Performed by: HOSPITALIST

## 2023-08-08 PROCEDURE — 80053 COMPREHEN METABOLIC PANEL: CPT | Performed by: EMERGENCY MEDICINE

## 2023-08-08 PROCEDURE — 96360 HYDRATION IV INFUSION INIT: CPT

## 2023-08-08 PROCEDURE — 93010 ELECTROCARDIOGRAM REPORT: CPT | Performed by: STUDENT IN AN ORGANIZED HEALTH CARE EDUCATION/TRAINING PROGRAM

## 2023-08-08 PROCEDURE — 82948 REAGENT STRIP/BLOOD GLUCOSE: CPT

## 2023-08-08 PROCEDURE — 83880 ASSAY OF NATRIURETIC PEPTIDE: CPT | Performed by: EMERGENCY MEDICINE

## 2023-08-08 PROCEDURE — 93971 EXTREMITY STUDY: CPT

## 2023-08-08 PROCEDURE — 99285 EMERGENCY DEPT VISIT HI MDM: CPT

## 2023-08-08 PROCEDURE — 71045 X-RAY EXAM CHEST 1 VIEW: CPT

## 2023-08-08 PROCEDURE — NC001 PR NO CHARGE: Performed by: EMERGENCY MEDICINE

## 2023-08-08 PROCEDURE — 85025 COMPLETE CBC W/AUTO DIFF WBC: CPT | Performed by: EMERGENCY MEDICINE

## 2023-08-08 PROCEDURE — G1004 CDSM NDSC: HCPCS

## 2023-08-08 PROCEDURE — 71275 CT ANGIOGRAPHY CHEST: CPT

## 2023-08-08 PROCEDURE — 83605 ASSAY OF LACTIC ACID: CPT | Performed by: EMERGENCY MEDICINE

## 2023-08-08 PROCEDURE — 81001 URINALYSIS AUTO W/SCOPE: CPT

## 2023-08-08 PROCEDURE — 85610 PROTHROMBIN TIME: CPT | Performed by: EMERGENCY MEDICINE

## 2023-08-08 PROCEDURE — 85730 THROMBOPLASTIN TIME PARTIAL: CPT | Performed by: HOSPITALIST

## 2023-08-08 PROCEDURE — 99285 EMERGENCY DEPT VISIT HI MDM: CPT | Performed by: EMERGENCY MEDICINE

## 2023-08-08 PROCEDURE — 99223 1ST HOSP IP/OBS HIGH 75: CPT | Performed by: HOSPITALIST

## 2023-08-08 PROCEDURE — 93970 EXTREMITY STUDY: CPT | Performed by: SURGERY

## 2023-08-08 PROCEDURE — 74174 CTA ABD&PLVS W/CONTRAST: CPT

## 2023-08-08 PROCEDURE — 36415 COLL VENOUS BLD VENIPUNCTURE: CPT | Performed by: EMERGENCY MEDICINE

## 2023-08-08 RX ORDER — INSULIN LISPRO 100 [IU]/ML
1-5 INJECTION, SOLUTION INTRAVENOUS; SUBCUTANEOUS
Status: DISCONTINUED | OUTPATIENT
Start: 2023-08-08 | End: 2023-08-15 | Stop reason: HOSPADM

## 2023-08-08 RX ORDER — ATORVASTATIN CALCIUM 40 MG/1
40 TABLET, FILM COATED ORAL
Status: DISCONTINUED | OUTPATIENT
Start: 2023-08-09 | End: 2023-08-15 | Stop reason: HOSPADM

## 2023-08-08 RX ORDER — AMLODIPINE BESYLATE 5 MG/1
5 TABLET ORAL DAILY
Status: DISCONTINUED | OUTPATIENT
Start: 2023-08-09 | End: 2023-08-15 | Stop reason: HOSPADM

## 2023-08-08 RX ORDER — HEPARIN SODIUM 1000 [USP'U]/ML
5000 INJECTION, SOLUTION INTRAVENOUS; SUBCUTANEOUS EVERY 6 HOURS PRN
Status: DISCONTINUED | OUTPATIENT
Start: 2023-08-08 | End: 2023-08-10

## 2023-08-08 RX ORDER — TOPIRAMATE 25 MG/1
100 TABLET ORAL
Status: DISCONTINUED | OUTPATIENT
Start: 2023-08-08 | End: 2023-08-15 | Stop reason: HOSPADM

## 2023-08-08 RX ORDER — ACETAMINOPHEN 325 MG/1
650 TABLET ORAL EVERY 6 HOURS PRN
Status: DISCONTINUED | OUTPATIENT
Start: 2023-08-08 | End: 2023-08-15 | Stop reason: HOSPADM

## 2023-08-08 RX ORDER — HEPARIN SODIUM 1000 [USP'U]/ML
10000 INJECTION, SOLUTION INTRAVENOUS; SUBCUTANEOUS ONCE
Status: COMPLETED | OUTPATIENT
Start: 2023-08-08 | End: 2023-08-08

## 2023-08-08 RX ORDER — LOSARTAN POTASSIUM 50 MG/1
100 TABLET ORAL DAILY
Status: DISCONTINUED | OUTPATIENT
Start: 2023-08-09 | End: 2023-08-15 | Stop reason: HOSPADM

## 2023-08-08 RX ORDER — INSULIN GLARGINE 100 [IU]/ML
25 INJECTION, SOLUTION SUBCUTANEOUS EVERY 12 HOURS SCHEDULED
Status: DISCONTINUED | OUTPATIENT
Start: 2023-08-08 | End: 2023-08-15 | Stop reason: HOSPADM

## 2023-08-08 RX ORDER — HEPARIN SODIUM 1000 [USP'U]/ML
10000 INJECTION, SOLUTION INTRAVENOUS; SUBCUTANEOUS EVERY 6 HOURS PRN
Status: DISCONTINUED | OUTPATIENT
Start: 2023-08-08 | End: 2023-08-10

## 2023-08-08 RX ORDER — ONDANSETRON 2 MG/ML
4 INJECTION INTRAMUSCULAR; INTRAVENOUS EVERY 6 HOURS PRN
Status: DISCONTINUED | OUTPATIENT
Start: 2023-08-08 | End: 2023-08-15 | Stop reason: HOSPADM

## 2023-08-08 RX ORDER — GABAPENTIN 100 MG/1
100 CAPSULE ORAL 2 TIMES DAILY
Status: DISCONTINUED | OUTPATIENT
Start: 2023-08-08 | End: 2023-08-15 | Stop reason: HOSPADM

## 2023-08-08 RX ORDER — INSULIN LISPRO 100 [IU]/ML
1-6 INJECTION, SOLUTION INTRAVENOUS; SUBCUTANEOUS
Status: DISCONTINUED | OUTPATIENT
Start: 2023-08-09 | End: 2023-08-15 | Stop reason: HOSPADM

## 2023-08-08 RX ORDER — HYDRALAZINE HYDROCHLORIDE 10 MG/1
10 TABLET, FILM COATED ORAL 3 TIMES DAILY
Status: DISCONTINUED | OUTPATIENT
Start: 2023-08-08 | End: 2023-08-15 | Stop reason: HOSPADM

## 2023-08-08 RX ORDER — INSULIN LISPRO 100 [IU]/ML
5 INJECTION, SOLUTION INTRAVENOUS; SUBCUTANEOUS
Status: DISCONTINUED | OUTPATIENT
Start: 2023-08-09 | End: 2023-08-11

## 2023-08-08 RX ORDER — METOPROLOL TARTRATE 100 MG/1
100 TABLET ORAL EVERY 12 HOURS SCHEDULED
Status: DISCONTINUED | OUTPATIENT
Start: 2023-08-08 | End: 2023-08-15 | Stop reason: HOSPADM

## 2023-08-08 RX ORDER — DOCUSATE SODIUM 100 MG/1
100 CAPSULE, LIQUID FILLED ORAL 2 TIMES DAILY
Status: DISCONTINUED | OUTPATIENT
Start: 2023-08-08 | End: 2023-08-15 | Stop reason: HOSPADM

## 2023-08-08 RX ORDER — BUDESONIDE AND FORMOTEROL FUMARATE DIHYDRATE 160; 4.5 UG/1; UG/1
1 AEROSOL RESPIRATORY (INHALATION) 2 TIMES DAILY
Status: DISCONTINUED | OUTPATIENT
Start: 2023-08-08 | End: 2023-08-15 | Stop reason: HOSPADM

## 2023-08-08 RX ORDER — FUROSEMIDE 20 MG/1
20 TABLET ORAL 2 TIMES DAILY
Status: DISCONTINUED | OUTPATIENT
Start: 2023-08-08 | End: 2023-08-15 | Stop reason: HOSPADM

## 2023-08-08 RX ORDER — LOPERAMIDE HYDROCHLORIDE 2 MG/1
2 CAPSULE ORAL 4 TIMES DAILY PRN
Status: DISCONTINUED | OUTPATIENT
Start: 2023-08-08 | End: 2023-08-15 | Stop reason: HOSPADM

## 2023-08-08 RX ORDER — HEPARIN SODIUM 10000 [USP'U]/100ML
3-30 INJECTION, SOLUTION INTRAVENOUS
Status: DISCONTINUED | OUTPATIENT
Start: 2023-08-08 | End: 2023-08-10

## 2023-08-08 RX ORDER — CEFAZOLIN SODIUM 2 G/50ML
2000 SOLUTION INTRAVENOUS EVERY 8 HOURS
Status: DISCONTINUED | OUTPATIENT
Start: 2023-08-08 | End: 2023-08-15

## 2023-08-08 RX ORDER — MELATONIN
2000 DAILY
Status: DISCONTINUED | OUTPATIENT
Start: 2023-08-09 | End: 2023-08-15 | Stop reason: HOSPADM

## 2023-08-08 RX ORDER — PREDNISONE 1 MG/1
4 TABLET ORAL DAILY
Status: DISCONTINUED | OUTPATIENT
Start: 2023-08-09 | End: 2023-08-15 | Stop reason: HOSPADM

## 2023-08-08 RX ADMIN — HEPARIN SODIUM 10000 UNITS: 1000 INJECTION INTRAVENOUS; SUBCUTANEOUS at 17:00

## 2023-08-08 RX ADMIN — BUDESONIDE AND FORMOTEROL FUMARATE DIHYDRATE 1 PUFF: 160; 4.5 AEROSOL RESPIRATORY (INHALATION) at 22:17

## 2023-08-08 RX ADMIN — IOHEXOL 100 ML: 350 INJECTION, SOLUTION INTRAVENOUS at 15:09

## 2023-08-08 RX ADMIN — TOPIRAMATE 100 MG: 25 TABLET, FILM COATED ORAL at 22:11

## 2023-08-08 RX ADMIN — ACETAMINOPHEN 325MG 650 MG: 325 TABLET ORAL at 22:17

## 2023-08-08 RX ADMIN — FUROSEMIDE 20 MG: 20 TABLET ORAL at 22:11

## 2023-08-08 RX ADMIN — HYDRALAZINE HYDROCHLORIDE 10 MG: 10 TABLET, FILM COATED ORAL at 22:11

## 2023-08-08 RX ADMIN — CEFAZOLIN SODIUM 2000 MG: 2 SOLUTION INTRAVENOUS at 23:06

## 2023-08-08 RX ADMIN — METOPROLOL TARTRATE 100 MG: 100 TABLET, FILM COATED ORAL at 22:10

## 2023-08-08 RX ADMIN — DOCUSATE SODIUM 100 MG: 100 CAPSULE, LIQUID FILLED ORAL at 22:11

## 2023-08-08 RX ADMIN — INSULIN LISPRO 1 UNITS: 100 INJECTION, SOLUTION INTRAVENOUS; SUBCUTANEOUS at 22:11

## 2023-08-08 RX ADMIN — GABAPENTIN 100 MG: 100 CAPSULE ORAL at 22:10

## 2023-08-08 RX ADMIN — INSULIN GLARGINE 25 UNITS: 100 INJECTION, SOLUTION SUBCUTANEOUS at 22:11

## 2023-08-08 RX ADMIN — SODIUM CHLORIDE 1000 ML: 0.9 INJECTION, SOLUTION INTRAVENOUS at 12:57

## 2023-08-08 RX ADMIN — HEPARIN SODIUM 18 UNITS/KG/HR: 10000 INJECTION, SOLUTION INTRAVENOUS at 17:02

## 2023-08-08 NOTE — ED PROVIDER NOTES
History  Chief Complaint   Patient presents with   • Chest Pain     Coming in from daybreak, via EMS, homeless, c/o of sharp chest pain, for the past couple of days, sharper yesterday, starts in back and radiates to front, + swelling in extremities, has not been taking water pill, + weeping wound right foot, diabetic, glucose with EMS 27     78-year-old female currently homeless with history of type 2 diabetes, hypertension, chronic kidney disease, obesity presents to the emergency department with multiple complaints. Patient states over the last 2 days she has had midthoracic back pain which has been constant but now radiating into the chest described as chest pressure. She has some shortness of breath. No diaphoresis. No known coronary artery disease. She also complains of increased bilateral right greater than left lower extremity swelling. She tells me she is compliant with her medications. She also feels generally fatigued and dehydrated as she often feels "rushed" in the bathroom and does not have great access to water. She is also complaining of worsening right foot wound which is now weeping. She has not been able to keep up on care of these wounds secondary to her homeless state. Patient had a recent admission in May of this year for similar complaints.       History provided by:  Patient   used: No    Chest Pain  Pain location:  Substernal area  Pain quality: pressure    Pain radiates to:  Mid back  Pain radiates to the back: yes    Onset quality:  Gradual  Duration:  2 days  Timing:  Constant  Progression:  Unchanged  Chronicity:  New  Context: at rest    Relieved by:  None tried  Worsened by:  Nothing tried  Ineffective treatments:  None tried  Associated symptoms: fatigue, lower extremity edema, shortness of breath and weakness    Associated symptoms: no abdominal pain, no altered mental status, no anorexia, no anxiety, no back pain, no claudication, no cough, no diaphoresis, no dizziness, no dysphagia, no fever, no headache, no heartburn, no nausea, no near-syncope, no numbness, no orthopnea, no palpitations, no PND, no syncope and not vomiting    Risk factors: diabetes mellitus, hypertension, immobilization, obesity and pregnancy    Risk factors: no coronary artery disease, no prior DVT/PE, no smoking and no surgery        Prior to Admission Medications   Prescriptions Last Dose Informant Patient Reported? Taking? Blood Glucose Monitoring Suppl (OneTouch Verio Reflect) w/Device KIT Past Week  No Yes   Sig: Use 1 each 4 (four) times a day   Blood Pressure Monitor KIT Past Week  No Yes   Sig: Check blood pressures BID   Continuous Blood Gluc  (FreeStyle Denisa 2 [place]) ERIC Past Week  No Yes   Sig: Use 1 each 4 (four) times a day   Continuous Blood Gluc Sensor (FreeStyle Denisa 2 Sensor) MISC Past Week  No Yes   Sig: Use 1 each every 14 (fourteen) days   Emollient (eucerin) lotion   No No   Sig: Apply 1 application. topically 2 (two) times a day = to B/L LE   Insulin Pen Needle (BD Pen Needle Tita 2nd Gen) 32G X 4 MM MISC Past Week  No Yes   Sig: For use with insulin pen. Pharmacy may dispense brand covered by insurance. Insulin Pen Needle (BD Pen Needle Tita U/F) 32G X 4 MM MISC Past Week  No Yes   Sig: Use four times daily as directed with insulin pen   Lancets (OneTouch Delica Plus [id number]) MISC Past Week  No Yes   Sig: Use 1 each 4 (four) times a day   Nutritional Supplements (ProSource No Carb) LIQD Past Week  Yes Yes   Sig: Take 30 mL by mouth 2 (two) times a day   Petrolatum-Zinc Oxide 57-17 % PSTE Past Week  Yes Yes   Sig: Apply 1 application.  topically 3 (three) times a day To sacral area - protective   acetaminophen (TYLENOL) 325 mg tablet   Yes No   Sig: Take 650 mg by mouth every 6 (six) hours as needed for mild pain   amLODIPine (NORVASC) 5 mg tablet Past Week  No Yes   Sig: Take 1 tablet (5 mg total) by mouth daily   aspirin (ECOTRIN LOW STRENGTH) 81 mg EC tablet Past Week  No Yes   Sig: Take 1 tablet (81 mg total) by mouth daily   atorvastatin (LIPITOR) 40 mg tablet Past Week  No Yes   Sig: Take 1 tablet (40 mg total) by mouth daily   budesonide-formoterol (Symbicort) 160-4.5 mcg/act inhaler Past Week  No Yes   Sig: Inhale 1 puff 2 (two) times a day Rinse mouth after use. cholecalciferol (VITAMIN D3) 1,000 units tablet Past Week  No Yes   Sig: Take 2 tablets (2,000 Units total) by mouth daily   docusate sodium (COLACE) 100 mg capsule   No No   Sig: Take 1 capsule (100 mg total) by mouth 2 (two) times a day   furosemide (LASIX) 20 mg tablet Past Week  No Yes   Sig: Take 1 tablet (20 mg total) by mouth 2 (two) times a day   gabapentin (NEURONTIN) 100 mg capsule Past Week  No Yes   Sig: Take 1 capsule (100 mg total) by mouth 2 (two) times a day   glucose blood (OneTouch Verio) test strip Past Week  No Yes   Sig: Check blood sugars four times daily. Please substitute with appropriate alternative as covered by patient's insurance.  Dx: E11.65   hydrALAZINE (APRESOLINE) 10 mg tablet Past Week  No Yes   Sig: Take 1 tablet (10 mg total) by mouth 3 (three) times a day   insulin glargine (LANTUS) 100 units/mL subcutaneous injection Past Week  No Yes   Sig: Inject 25 Units under the skin every 12 (twelve) hours   insulin lispro (HumaLOG) 100 units/mL injection Past Week  No Yes   Sig: Inject 5 Units under the skin 3 (three) times a day with meals   loperamide (IMODIUM) 2 mg capsule Past Week  No Yes   Sig: Take 1 capsule (2 mg total) by mouth 4 (four) times a day as needed for diarrhea (can have up to 8mg per day)   losartan (COZAAR) 100 MG tablet Past Week  No Yes   Sig: Take 1 tablet (100 mg total) by mouth daily   metFORMIN (GLUCOPHAGE) 1000 MG tablet Past Week  No Yes   Sig: Take 1 tablet (1,000 mg total) by mouth 2 (two) times a day with meals   metoprolol tartrate (LOPRESSOR) 100 mg tablet Past Week  No Yes   Sig: Take 1 tablet (100 mg total) by mouth every 12 (twelve) hours   multivitamin-iron-minerals-folic acid (THERAPEUTIC-M) TABS tablet Past Week  No Yes   Sig: Take 1 tablet by mouth daily   predniSONE 1 mg tablet Past Week  No Yes   Sig: Take 4 tablets (4 mg total) by mouth daily   topiramate (TOPAMAX) 100 mg tablet Past Week  No Yes   Sig: Take 1 tablet (100 mg total) by mouth daily at bedtime      Facility-Administered Medications: None       Past Medical History:   Diagnosis Date   • Anemia    • Arthritis    • Benign hypertension     Procedure/Onset: 01/01/2005; Description: BP elevated today. Pt reports running out of BP meds 2wks ago. Will resume BP meds.    • Diabetes mellitus ([address])    • Diabetes mellitus type 2 with complications, uncontrolled 9/8/2015   • Dyspnea on exertion    • Hyperlipidemia    • Hypertension    • Legally blind 2010   • Mild intermittent asthma with exacerbation 8/4/2018   • Miscarriage     x 3   • Polymyalgia rheumatica ([address]) 11/24/2021   • Seizures ([address])    • Sickle cell trait ([address])    • Sleep apnea    • Stage 3a chronic kidney disease ([address]) 5/19/2022   • Thyroid nodule 3/6/2020       Past Surgical History:   Procedure Laterality Date   • CATARACT EXTRACTION Bilateral     august and september   • EYE SURGERY     • HYSTERECTOMY      37   • OOPHORECTOMY Left     37   • IN LIGATION/BIOPSY TEMPORAL ARTERY Bilateral 10/17/2019    Procedure: BIOPSY ARTERY TEMPORAL;  Surgeon: Dannielle Vera DO;  Location: [facility];  Service: Vascular   • US GUIDED THYROID BIOPSY  5/13/2020       Family History   Problem Relation Age of Onset   • Heart attack Mother    • Heart disease Mother    • Hypertension Mother    • No Known Problems Father    • Heart disease Sister    • No Known Problems Brother    • No Known Problems Son    • No Known Problems Daughter    • Heart attack Maternal Grandmother    • Heart disease Maternal Grandmother    • Diabetes Other    • Heart attack Other    • No Known Problems Sister    • No Known Problems Sister    • No Known Problems Paternal Aunt    • No Known Problems Son    • Cancer Neg Hx    • Stroke Neg Hx      I have reviewed and agree with the history as documented. E-Cigarette/Vaping   • E-Cigarette Use Never User      E-Cigarette/Vaping Substances     Social History     Tobacco Use   • Smoking status: Never   • Smokeless tobacco: Never   Vaping Use   • Vaping Use: Never used   Substance Use Topics   • Alcohol use: Never     Alcohol/week: 0.0 standard drinks of alcohol   • Drug use: No       Review of Systems   Constitutional: Positive for fatigue. Negative for chills, diaphoresis and fever. HENT: Negative. Negative for congestion, rhinorrhea, sore throat and trouble swallowing. Eyes: Negative. Negative for discharge, redness and itching. Respiratory: Positive for shortness of breath. Negative for apnea, cough, chest tightness and wheezing. Cardiovascular: Positive for chest pain. Negative for palpitations, orthopnea, claudication, leg swelling, syncope, PND and near-syncope. Gastrointestinal: Negative. Negative for abdominal pain, anorexia, heartburn, nausea and vomiting. Endocrine: Negative. Genitourinary: Negative. Negative for flank pain, frequency and urgency. Musculoskeletal: Negative. Negative for back pain. Skin: Negative. Allergic/Immunologic: Negative. Neurological: Positive for weakness. Negative for dizziness, syncope, light-headedness, numbness and headaches. Hematological: Negative. All other systems reviewed and are negative. Physical Exam  Physical Exam  Vitals and nursing note reviewed. Constitutional:       General: She is not in acute distress. Appearance: She is well-developed. She is obese. She is not ill-appearing, toxic-appearing or diaphoretic. HENT:      Head: Normocephalic and atraumatic.       Right Ear: External ear normal.      Left Ear: External ear normal.      Nose: Nose normal.      Mouth/Throat:      Mouth: Mucous membranes are moist. Pharynx: No oropharyngeal exudate. Eyes:      General: No scleral icterus. Right eye: No discharge. Left eye: No discharge. Conjunctiva/sclera: Conjunctivae normal.      Pupils: Pupils are equal, round, and reactive to light. Neck:      Thyroid: No thyromegaly. Vascular: No JVD. Trachea: No tracheal deviation. Cardiovascular:      Rate and Rhythm: Regular rhythm. Tachycardia present. Heart sounds: Normal heart sounds. No murmur heard. No friction rub. No gallop. Pulmonary:      Effort: Pulmonary effort is normal. No tachypnea, accessory muscle usage or respiratory distress. Breath sounds: Normal breath sounds. No stridor. No wheezing, rhonchi or rales. Chest:      Chest wall: No tenderness. Abdominal:      General: Bowel sounds are normal. There is no distension. Palpations: Abdomen is soft. There is no mass. Tenderness: There is no abdominal tenderness. Hernia: No hernia is present. Musculoskeletal:         General: Swelling present. No tenderness or deformity. Normal range of motion. Cervical back: Normal range of motion and neck supple. Right lower leg: Edema (+2 pitting) present. Left lower leg: Edema (+2 pitting) present. Comments: See clinical images on chart. Multiple open wounds anterior aspect right foot with sloughing, wet skin. Sloughing skin left foot with no open wounds. No erythema. No tenderness. Lymphadenopathy:      Cervical: No cervical adenopathy. Skin:     General: Skin is warm and dry. Coloration: Skin is not jaundiced or pale. Findings: No bruising, erythema, lesion or rash. Neurological:      General: No focal deficit present. Mental Status: She is alert and oriented to person, place, and time. Motor: No weakness or abnormal muscle tone. Deep Tendon Reflexes: Reflexes are normal and symmetric.    Psychiatric:         Mood and Affect: Mood normal.         Behavior: Behavior is cooperative. Vital Signs  ED Triage Vitals [08/08/23 1146]   Temperature Pulse Respirations Blood Pressure SpO2   98.4 °F (36.9 °C) 102 19 126/68 100 %      Temp Source Heart Rate Source Patient Position - Orthostatic VS BP Location FiO2 (%)   Oral Monitor Sitting Right arm --      Pain Score       No Pain           Vitals:    08/08/23 1146 08/08/23 1300 08/08/23 1400 08/08/23 1527   BP: 126/68 167/70 162/74 (!) 176/79   Pulse: 102 (!) 106 (!) 109 (!) 114   Patient Position - Orthostatic VS: Sitting            Visual Acuity      ED Medications  Medications   heparin (porcine) injection 10,000 Units (has no administration in time range)   heparin (porcine) 25,000 units in 0.45% NaCl 250 mL infusion (premix) (has no administration in time range)   heparin (porcine) injection 10,000 Units (has no administration in time range)   heparin (porcine) injection 5,000 Units (has no administration in time range)   sodium chloride 0.9 % bolus 1,000 mL (1,000 mL Intravenous New Bag 8/8/23 1257)   iohexol (OMNIPAQUE) 350 MG/ML injection (SINGLE-DOSE) 100 mL (100 mL Intravenous Given 8/8/23 1509)       Diagnostic Studies  Results Reviewed     Procedure Component Value Units Date/Time    HS Troponin I 2hr [[id number]]  (Normal) Collected: 08/08/23 1533    Lab Status: Final result Specimen: Blood from Arm, Right Updated: 08/08/23 1615     hs TnI 2hr 3 ng/L      Delta 2hr hsTnI 0 ng/L     Lactic acid, plasma (w/reflex if result > 2.0) [[id number]]  (Normal) Collected: 08/08/23 1533    Lab Status: Final result Specimen: Blood from Arm, Right Updated: 08/08/23 1608     LACTIC ACID 1.8 mmol/L     Narrative:      Result may be elevated if tourniquet was used during collection.     HS Troponin I 4hr [[id number]]     Lab Status: No result Specimen: Blood     HS Troponin 0hr (reflex protocol) [[id number]]  (Normal) Collected: 08/08/23 1302    Lab Status: Final result Specimen: Blood from Arm, Right Updated: 08/08/23 1455     hs TnI 0hr 3 ng/L     B-Type Natriuretic Peptide(BNP) [[id number]]  (Normal) Collected: 08/08/23 1302    Lab Status: Final result Specimen: Blood from Arm, Right Updated: 08/08/23 1454     BNP 33 pg/mL     Comprehensive metabolic panel [[id number]]  (Abnormal) Collected: 08/08/23 1302    Lab Status: Final result Specimen: Blood from Arm, Right Updated: 08/08/23 1452     Sodium 136 mmol/L      Potassium 3.5 mmol/L      Chloride 101 mmol/L      CO2 27 mmol/L      ANION GAP 8 mmol/L      BUN 19 mg/dL      Creatinine 0.85 mg/dL      Glucose 260 mg/dL      Calcium 9.3 mg/dL      Corrected Calcium 9.8 mg/dL      AST 10 U/L      ALT 7 U/L      Alkaline Phosphatase 132 U/L      Total Protein 7.4 g/dL      Albumin 3.4 g/dL      Total Bilirubin 0.79 mg/dL      eGFR 72 ml/min/1.73sq m     Narrative:      [facility] guidelines for Chronic Kid[facility] Disease (CKD):   •  Stage 1 with normal or high GFR (GFR > 90 mL/min/1.73 square meters)  •  Stage 2 Mild CKD (GFR = 60-89 mL/min/1.73 square meters)  •  Stage 3A Moderate CKD (GFR = 45-59 mL/min/1.73 square meters)  •  Stage 3B Moderate CKD (GFR = 30-44 mL/min/1.73 square meters)  •  Stage 4 Severe CKD (GFR = 15-29 mL/min/1.73 square meters)  •  Stage 5 End Stage CKD (GFR <15 mL/min/1.73 square meters)  Note: GFR calculation is accurate only with a steady state creatinine    Protime-INR [[id number]]  (Normal) Collected: 08/08/23 1302    Lab Status: Final result Specimen: Blood from Arm, Right Updated: 08/08/23 1424     Protime 13.2 seconds      INR 1.00    APTT [[id number]]  (Normal) Collected: 08/08/23 1302    Lab Status: Final result Specimen: Blood from Arm, Right Updated: 08/08/23 1424     PTT 36 seconds     CBC and differential [[id number]]  (Abnormal) Collected: 08/08/23 1302    Lab Status: Final result Specimen: Blood from Arm, Right Updated: 08/08/23 1423     WBC 12.57 Thousand/uL      RBC 4.52 Million/uL      Hemoglobin 10.8 g/dL      Hematocrit 35.7 %      MCV 79 fL      MCH 23.9 pg      MCHC 30.3 g/dL      RDW 16.3 %      MPV 11.3 fL      Platelets 627 Thousands/uL      nRBC 0 /100 WBCs      Neutrophils Relative 72 %      Immat GRANS % 2 %      Lymphocytes Relative 17 %      Monocytes Relative 6 %      Eosinophils Relative 2 %      Basophils Relative 1 %      Neutrophils Absolute 9.12 Thousands/µL      Immature Grans Absolute 0.25 Thousand/uL      Lymphocytes Absolute 2.14 Thousands/µL      Monocytes Absolute 0.75 Thousand/µL      Eosinophils Absolute 0.25 Thousand/µL      Basophils Absolute 0.06 Thousands/µL     Urine Macroscopic, POC [[id number]]  (Abnormal) Collected: 08/08/23 1243    Lab Status: Final result Specimen: Urine Updated: 08/08/23 1244     Color, UA Yellow     Clarity, UA Clear     pH, UA 5.0     Leukocytes, UA Negative     Nitrite, UA Negative     Protein, UA >=300 mg/dl      Glucose,  (1/10%) mg/dl      Ketones, UA Negative mg/dl      Urobilinogen, UA 0.2 E.U./dl      Bilirubin, UA Negative     Occult Blood, UA Negative     Specific Gravity, UA 1.020    Narrative:      CLINITEK RESULT    Urine Microscopic [[id number]] Collected: 08/08/23 1243    Lab Status: No result Specimen: Urine, Clean Catch                  CTA dissection protocol chest abdomen pelvis w wo contrast   Final Result by Jose Lopez MD (08/08 1611)      No thoracic aortic dissection      Bilateral pulmonary embolism with pulmonary emboli in the right main pulmonary artery, right lower lobe segmental subsegmental vessels and left lower lobe pulmonary artery.  Consider follow-up CTA at 3 months to demonstrate resolution   RV LV ratio is less than 0.9      Ventral abdominal wall hernia      Rounded nodular density seen in the outer left breast measuring 1.3 cm probably due to intramammary lymph nodes, correlate with mammography      I personally discussed this study with Cira Solis on 8/8/2023 4:05 PM.               Workstation performed: [id number] XR chest 1 view portable   Final Result by Sophie Porter MD (08/08 8625)      No acute cardiopulmonary disease.                   Workstation performed: [id number]         VAS lower limb venous duplex study, unilateral/limited    (Results Pending)   VAS lower limb venous duplex study, unilateral/limited    (Results Pending)              Procedures  ECG 12 Lead Documentation Only    Date/Time: 8/8/2023 2:21 PM    Performed by: Jason Oneill DO  Authorized by: Jason Oneill DO    Indications / Diagnosis:  Cp  ECG reviewed by me, the ED Provider: yes    Patient location:  ED  Interpretation:     Interpretation: abnormal    Rate:     ECG rate:  102    ECG rate assessment: tachycardic    Rhythm:     Rhythm: sinus tachycardia    Ectopy:     Ectopy: PVCs      PVCs:  Infrequent  Conduction:     Conduction: normal    ST segments:     ST segments:  Normal  T waves:     T waves: normal      CriticalCare Time    Date/Time: 8/8/2023 4:13 PM    Performed by: Jason Oneill DO  Authorized by: Jason Oneill DO    Critical care provider statement:     Critical care time (minutes):  30    Critical care time was exclusive of:  Separately billable procedures and treating other patients and teaching time    Critical care was necessary to treat or prevent imminent or life-threatening deterioration of the following conditions:  Respiratory failure    Critical care was time spent personally by me on the following activities:  Blood draw for specimens, obtaining history from patient or surrogate, development of treatment plan with patient or surrogate, discussions with consultants, evaluation of patient's response to treatment, examination of patient, interpretation of cardiac output measurements, ordering and performing treatments and interventions, ordering and review of laboratory studies, ordering and review of radiographic studies, re-evaluation of patient's condition and review of old charts I assumed direction of critical care for this patient from another provider in my specialty: no               ED Course  ED Course as of 08/08/23 1627   Tue Aug 08, 2023   1322 UA negative for UTI   1414 Chest x-ray: Normal   1434 WBC(!): 12.57   1434 Hemoglobin(!): 10.8   1501 hs TnI 0hr: 3   1502 BNP: 33   1502 Glucose, Random(!): 260   1502 Anion Gap: 8   1502 eGFR: 72   1502 Creatinine: 0.85   1606 Venous duplex right lower extremity negative for DVT   1612 LACTIC ACID: 1.8   1614 CTA chest abdomen pelvis:No thoracic aortic dissection Bilateral pulmonary embolism with pulmonary emboli in the right main pulmonary artery, right lower lobe segmental subsegmental vessels and left lower lobe pulmonary artery. Consider follow-up CTA at 3 months to demonstrate resolution RV LV ratio is less than 0.9 Ventral abdominal wall hernia Rounded nodular density seen in the outer left breast measuring 1.3 cm probably due to intramammary lymph nodes, correlate with mammography   1618 hs TnI 2hr: 3   1618 Delta 2hr hsTnI: 0             HEART Risk Score    Flowsheet Row Most Recent Value   Heart Score Risk Calculator    History 1 Filed at: 08/08/2023 1612   ECG 1 Filed at: 08/08/2023 1612   Age 2 Filed at: 08/08/2023 1612   Risk Factors 1 Filed at: 08/08/2023 1612   Troponin 0 Filed at: 08/08/2023 1612   HEART Score 5 Filed at: 08/08/2023 1612                     Initial Sepsis Screening     Row Name 08/08/23 1613                Is the patient's history suggestive of a new or worsening infection? Yes (Proceed)  -[address] [facility][1]        Suspected source of infection soft tissue  -MK        Indicate SIRS criteria Tachycardia > 90 bpm;Leukocytosis (WBC > 86614 IJL) OR Leukopenia (WBC <4000 IJL) OR Bandemia (WBC >10% bands)  -MK        Are two or more of the above signs & symptoms of infection both present and new to the patient?  No  -[place]  (r) = Recorded By, (t) = Taken By, (c) = Cosigned By    [facility] Name Provider Type    [address] [facility][1] [place] Covert, DO Physician                              Medical Decision Making  53-year-old female currently homeless presents to the ED with multiple complaints. She complains of a 2-day history of upper back pain now radiating into the chest described as pressure. She sometimes feels short of breath. No diaphoresis. No known coronary artery disease. She also complains of generalized weakness and feels dehydrated. She states her right foot wounds are worse and now weeping. She states she has not been able to keep up with wound care. On exam she is alert no acute distress. She is mildly tachycardic on exam.  She states she has worsening swelling in her right lower extremity but both legs are edematous. There is several open wounds on the right foot with sloughing skin. We will do broad-based work-up, cardiac/infectious. There is some concern for PE or aortic dissection but she is currently stable and does have a history of STEFFI so may need to rehydrate before obtaining these studies. Will obtain venous duplex right lower extremity. Patient will require admission secondary to all of her risk factors for ACS and also for wound care of her feet. Amount and/or Complexity of Data Reviewed  Labs: ordered. Decision-making details documented in ED Course. Radiology: ordered and independent interpretation performed. ECG/medicine tests: ordered and independent interpretation performed. Details: Sinus tachycardia 102. Infrequent PVCs. Normal conduction. Normal ST-T waves. No STEMI. Risk  Prescription drug management.           Disposition  Final diagnoses:   Bilateral pulmonary embolism ([address])   Diabetic ulcer of right midfoot associated with type 2 diabetes mellitus, limited to breakdown of skin ([address])     Time reflects when diagnosis was documented in both MDM as applicable and the Disposition within this note     Time User Action Codes Description Comment    8/8/2023  4:25 PM Mary Lou Orta Delialupe Amandachai A Add [I26.99] Bilateral pulmonary embolism ([address])     8/8/2023  4:26 PM Cloteal Marcus Add [H06.458,  L97.411] Diabetic ulcer of right midfoot associated with type 2 diabetes mellitus, limited to breakdown of skin [facility])       ED Disposition     ED Disposition   Admit    Condition   Stable    Date/Time   Tue Aug 8, 2023  4:25 PM    Comment   Case was discussed with  dr Carlos Manuel Cabrales and the patient's admission status was agreed to be Admission Status: inpatient status to the service of Dr. Carlos Manuel Cabrales . Follow-up Information    None         Patient's Medications   Discharge Prescriptions    No medications on file       No discharge procedures on file.     PDMP Review       Value Time User    PDMP Reviewed  Yes 4/12/2023  1:16 AM Keegan Montero PA-C          ED Provider  Electronically Signed by           Sil Izaguirre DO  08/08/23 [address],   08/08/23 0[address] [place], [place]  08/08/23 4046

## 2023-08-09 ENCOUNTER — APPOINTMENT (INPATIENT)
Dept: RADIOLOGY | Facility: HOSPITAL | Age: 63
DRG: 622 | End: 2023-08-09
Payer: COMMERCIAL

## 2023-08-09 ENCOUNTER — APPOINTMENT (INPATIENT)
Dept: NON INVASIVE DIAGNOSTICS | Facility: HOSPITAL | Age: 63
DRG: 622 | End: 2023-08-09
Payer: COMMERCIAL

## 2023-08-09 ENCOUNTER — PATIENT OUTREACH (OUTPATIENT)
Dept: FAMILY MEDICINE CLINIC | Facility: CLINIC | Age: 63
End: 2023-08-09

## 2023-08-09 LAB
ANION GAP SERPL CALCULATED.3IONS-SCNC: 4 MMOL/L
AORTIC ROOT: 3.4 CM
APICAL FOUR CHAMBER EJECTION FRACTION: 44 %
APTT PPP: 86 SECONDS (ref 23–37)
APTT PPP: 89 SECONDS (ref 23–37)
APTT PPP: 92 SECONDS (ref 23–37)
ASCENDING AORTA: 3.8 CM
ATRIAL RATE: 62 BPM
BASOPHILS # BLD AUTO: 0.08 THOUSANDS/ÂΜL (ref 0–0.1)
BASOPHILS NFR BLD AUTO: 1 % (ref 0–1)
BUN SERPL-MCNC: 14 MG/DL (ref 5–25)
CALCIUM SERPL-MCNC: 8.4 MG/DL (ref 8.4–10.2)
CHLORIDE SERPL-SCNC: 107 MMOL/L (ref 96–108)
CO2 SERPL-SCNC: 28 MMOL/L (ref 21–32)
CREAT SERPL-MCNC: 0.86 MG/DL (ref 0.6–1.3)
E WAVE DECELERATION TIME: 183 MS
EOSINOPHIL # BLD AUTO: 0.29 THOUSAND/ÂΜL (ref 0–0.61)
EOSINOPHIL NFR BLD AUTO: 2 % (ref 0–6)
ERYTHROCYTE [DISTWIDTH] IN BLOOD BY AUTOMATED COUNT: 16 % (ref 11.6–15.1)
FRACTIONAL SHORTENING: 30 % (ref 28–44)
GFR SERPL CREATININE-BSD FRML MDRD: 71 ML/MIN/1.73SQ M
GLUCOSE SERPL-MCNC: 107 MG/DL (ref 65–140)
GLUCOSE SERPL-MCNC: 167 MG/DL (ref 65–140)
GLUCOSE SERPL-MCNC: 207 MG/DL (ref 65–140)
GLUCOSE SERPL-MCNC: 256 MG/DL (ref 65–140)
GLUCOSE SERPL-MCNC: 286 MG/DL (ref 65–140)
GLUCOSE SERPL-MCNC: 84 MG/DL (ref 65–140)
GLUCOSE SERPL-MCNC: 94 MG/DL (ref 65–140)
HCT VFR BLD AUTO: 28.8 % (ref 34.8–46.1)
HGB BLD-MCNC: 8.5 G/DL (ref 11.5–15.4)
IMM GRANULOCYTES # BLD AUTO: 0.1 THOUSAND/UL (ref 0–0.2)
IMM GRANULOCYTES NFR BLD AUTO: 1 % (ref 0–2)
INTERVENTRICULAR SEPTUM IN DIASTOLE (PARASTERNAL SHORT AXIS VIEW): 1.1 CM
INTERVENTRICULAR SEPTUM: 1.1 CM (ref 0.6–1.1)
LAAS-AP2: 21.1 CM2
LAAS-AP4: 19.8 CM2
LEFT ATRIUM SIZE: 3.6 CM
LEFT ATRIUM VOLUME (MOD BIPLANE): 66 ML
LEFT INTERNAL DIMENSION IN SYSTOLE: 3.8 CM (ref 2.1–4)
LEFT VENTRICULAR INTERNAL DIMENSION IN DIASTOLE: 5.4 CM (ref 3.5–6)
LEFT VENTRICULAR POSTERIOR WALL IN END DIASTOLE: 1.3 CM
LEFT VENTRICULAR STROKE VOLUME: 77 ML
LVSV (TEICH): 77 ML
LYMPHOCYTES # BLD AUTO: 3.04 THOUSANDS/ÂΜL (ref 0.6–4.47)
LYMPHOCYTES NFR BLD AUTO: 24 % (ref 14–44)
MAGNESIUM SERPL-MCNC: 1.7 MG/DL (ref 1.9–2.7)
MAGNESIUM SERPL-MCNC: 2 MG/DL (ref 1.9–2.7)
MCH RBC QN AUTO: 23.6 PG (ref 26.8–34.3)
MCHC RBC AUTO-ENTMCNC: 29.5 G/DL (ref 31.4–37.4)
MCV RBC AUTO: 80 FL (ref 82–98)
MONOCYTES # BLD AUTO: 0.95 THOUSAND/ÂΜL (ref 0.17–1.22)
MONOCYTES NFR BLD AUTO: 7 % (ref 4–12)
MV E'TISSUE VEL-LAT: 10 CM/S
MV E'TISSUE VEL-SEP: 12 CM/S
MV PEAK A VEL: 0.96 M/S
MV PEAK E VEL: 88 CM/S
MV STENOSIS PRESSURE HALF TIME: 53 MS
MV VALVE AREA P 1/2 METHOD: 4.15 CM2
NEUTROPHILS # BLD AUTO: 8.39 THOUSANDS/ÂΜL (ref 1.85–7.62)
NEUTS SEG NFR BLD AUTO: 65 % (ref 43–75)
NRBC BLD AUTO-RTO: 0 /100 WBCS
P AXIS: 48 DEGREES
PLATELET # BLD AUTO: 398 THOUSANDS/UL (ref 149–390)
PMV BLD AUTO: 10.6 FL (ref 8.9–12.7)
POTASSIUM SERPL-SCNC: 3.3 MMOL/L (ref 3.5–5.3)
PR INTERVAL: 162 MS
QRS AXIS: 1 DEGREES
QRSD INTERVAL: 108 MS
QT INTERVAL: 554 MS
QTC INTERVAL: 562 MS
RA PRESSURE ESTIMATED: 15 MMHG
RBC # BLD AUTO: 3.6 MILLION/UL (ref 3.81–5.12)
RIGHT ATRIUM AREA SYSTOLE A4C: 18.1 CM2
RIGHT VENTRICLE ID DIMENSION: 3.5 CM
RV PSP: 37 MMHG
SL CV LEFT ATRIUM LENGTH A2C: 5.1 CM
SL CV LV EF: 55
SL CV PED ECHO LEFT VENTRICLE DIASTOLIC VOLUME (MOD BIPLANE) 2D: 141 ML
SL CV PED ECHO LEFT VENTRICLE SYSTOLIC VOLUME (MOD BIPLANE) 2D: 63 ML
SODIUM SERPL-SCNC: 139 MMOL/L (ref 135–147)
T WAVE AXIS: 42 DEGREES
TR MAX PG: 22 MMHG
TR PEAK VELOCITY: 2.3 M/S
TRICUSPID ANNULAR PLANE SYSTOLIC EXCURSION: 3.5 CM
TRICUSPID VALVE PEAK REGURGITATION VELOCITY: 2.33 M/S
VENTRICULAR RATE: 62 BPM
WBC # BLD AUTO: 12.85 THOUSAND/UL (ref 4.31–10.16)

## 2023-08-09 PROCEDURE — 80048 BASIC METABOLIC PNL TOTAL CA: CPT | Performed by: HOSPITALIST

## 2023-08-09 PROCEDURE — 0JBQ0ZZ EXCISION OF RIGHT FOOT SUBCUTANEOUS TISSUE AND FASCIA, OPEN APPROACH: ICD-10-PCS | Performed by: PODIATRIST

## 2023-08-09 PROCEDURE — 97167 OT EVAL HIGH COMPLEX 60 MIN: CPT

## 2023-08-09 PROCEDURE — 83735 ASSAY OF MAGNESIUM: CPT

## 2023-08-09 PROCEDURE — 83735 ASSAY OF MAGNESIUM: CPT | Performed by: HOSPITALIST

## 2023-08-09 PROCEDURE — 85025 COMPLETE CBC W/AUTO DIFF WBC: CPT | Performed by: HOSPITALIST

## 2023-08-09 PROCEDURE — 93306 TTE W/DOPPLER COMPLETE: CPT | Performed by: STUDENT IN AN ORGANIZED HEALTH CARE EDUCATION/TRAINING PROGRAM

## 2023-08-09 PROCEDURE — 85730 THROMBOPLASTIN TIME PARTIAL: CPT | Performed by: HOSPITALIST

## 2023-08-09 PROCEDURE — 87070 CULTURE OTHR SPECIMN AEROBIC: CPT

## 2023-08-09 PROCEDURE — 73630 X-RAY EXAM OF FOOT: CPT

## 2023-08-09 PROCEDURE — 93005 ELECTROCARDIOGRAM TRACING: CPT

## 2023-08-09 PROCEDURE — 85730 THROMBOPLASTIN TIME PARTIAL: CPT | Performed by: INTERNAL MEDICINE

## 2023-08-09 PROCEDURE — 87205 SMEAR GRAM STAIN: CPT

## 2023-08-09 PROCEDURE — 93010 ELECTROCARDIOGRAM REPORT: CPT

## 2023-08-09 PROCEDURE — 87075 CULTR BACTERIA EXCEPT BLOOD: CPT

## 2023-08-09 PROCEDURE — 82948 REAGENT STRIP/BLOOD GLUCOSE: CPT

## 2023-08-09 PROCEDURE — 97163 PT EVAL HIGH COMPLEX 45 MIN: CPT

## 2023-08-09 PROCEDURE — 99222 1ST HOSP IP/OBS MODERATE 55: CPT | Performed by: PODIATRIST

## 2023-08-09 PROCEDURE — 99232 SBSQ HOSP IP/OBS MODERATE 35: CPT

## 2023-08-09 PROCEDURE — 93306 TTE W/DOPPLER COMPLETE: CPT

## 2023-08-09 PROCEDURE — NC001 PR NO CHARGE: Performed by: PODIATRIST

## 2023-08-09 RX ORDER — POTASSIUM CHLORIDE 20 MEQ/1
40 TABLET, EXTENDED RELEASE ORAL ONCE
Status: COMPLETED | OUTPATIENT
Start: 2023-08-09 | End: 2023-08-09

## 2023-08-09 RX ORDER — MUSCLE RUB CREAM 100; 150 MG/G; MG/G
CREAM TOPICAL 4 TIMES DAILY PRN
Status: DISCONTINUED | OUTPATIENT
Start: 2023-08-09 | End: 2023-08-15 | Stop reason: HOSPADM

## 2023-08-09 RX ORDER — MAGNESIUM SULFATE HEPTAHYDRATE 40 MG/ML
2 INJECTION, SOLUTION INTRAVENOUS ONCE
Status: COMPLETED | OUTPATIENT
Start: 2023-08-09 | End: 2023-08-09

## 2023-08-09 RX ADMIN — INSULIN GLARGINE 25 UNITS: 100 INJECTION, SOLUTION SUBCUTANEOUS at 08:18

## 2023-08-09 RX ADMIN — HEPARIN SODIUM 15 UNITS/KG/HR: 10000 INJECTION, SOLUTION INTRAVENOUS at 06:43

## 2023-08-09 RX ADMIN — GABAPENTIN 100 MG: 100 CAPSULE ORAL at 17:07

## 2023-08-09 RX ADMIN — CEFAZOLIN SODIUM 2000 MG: 2 SOLUTION INTRAVENOUS at 05:52

## 2023-08-09 RX ADMIN — ASPIRIN 81 MG: 81 TABLET, COATED ORAL at 08:19

## 2023-08-09 RX ADMIN — FUROSEMIDE 20 MG: 20 TABLET ORAL at 17:07

## 2023-08-09 RX ADMIN — ATORVASTATIN CALCIUM 40 MG: 40 TABLET, FILM COATED ORAL at 17:07

## 2023-08-09 RX ADMIN — DOCUSATE SODIUM 100 MG: 100 CAPSULE, LIQUID FILLED ORAL at 08:19

## 2023-08-09 RX ADMIN — MAGNESIUM SULFATE 2 G: 2 INJECTION INTRAVENOUS at 09:44

## 2023-08-09 RX ADMIN — AMLODIPINE BESYLATE 5 MG: 5 TABLET ORAL at 08:20

## 2023-08-09 RX ADMIN — INSULIN GLARGINE 25 UNITS: 100 INJECTION, SOLUTION SUBCUTANEOUS at 21:14

## 2023-08-09 RX ADMIN — CEFAZOLIN SODIUM 2000 MG: 2 SOLUTION INTRAVENOUS at 22:40

## 2023-08-09 RX ADMIN — LOSARTAN POTASSIUM 100 MG: 50 TABLET, FILM COATED ORAL at 08:19

## 2023-08-09 RX ADMIN — INSULIN LISPRO 5 UNITS: 100 INJECTION, SOLUTION INTRAVENOUS; SUBCUTANEOUS at 17:06

## 2023-08-09 RX ADMIN — Medication 2000 UNITS: at 08:20

## 2023-08-09 RX ADMIN — HEPARIN SODIUM 13 UNITS/KG/HR: 10000 INJECTION, SOLUTION INTRAVENOUS at 21:25

## 2023-08-09 RX ADMIN — PREDNISONE 4 MG: 1 TABLET ORAL at 08:19

## 2023-08-09 RX ADMIN — INSULIN LISPRO 2 UNITS: 100 INJECTION, SOLUTION INTRAVENOUS; SUBCUTANEOUS at 17:06

## 2023-08-09 RX ADMIN — POTASSIUM CHLORIDE 40 MEQ: 1500 TABLET, EXTENDED RELEASE ORAL at 09:44

## 2023-08-09 RX ADMIN — HYDRALAZINE HYDROCHLORIDE 10 MG: 10 TABLET, FILM COATED ORAL at 08:19

## 2023-08-09 RX ADMIN — FUROSEMIDE 20 MG: 20 TABLET ORAL at 08:20

## 2023-08-09 RX ADMIN — INSULIN LISPRO 5 UNITS: 100 INJECTION, SOLUTION INTRAVENOUS; SUBCUTANEOUS at 08:19

## 2023-08-09 RX ADMIN — METOPROLOL TARTRATE 100 MG: 100 TABLET, FILM COATED ORAL at 21:14

## 2023-08-09 RX ADMIN — HYDRALAZINE HYDROCHLORIDE 10 MG: 10 TABLET, FILM COATED ORAL at 21:13

## 2023-08-09 RX ADMIN — TOPIRAMATE 100 MG: 25 TABLET, FILM COATED ORAL at 21:14

## 2023-08-09 RX ADMIN — GABAPENTIN 100 MG: 100 CAPSULE ORAL at 08:20

## 2023-08-09 RX ADMIN — INSULIN LISPRO 2 UNITS: 100 INJECTION, SOLUTION INTRAVENOUS; SUBCUTANEOUS at 21:16

## 2023-08-09 RX ADMIN — BUDESONIDE AND FORMOTEROL FUMARATE DIHYDRATE 1 PUFF: 160; 4.5 AEROSOL RESPIRATORY (INHALATION) at 08:19

## 2023-08-09 RX ADMIN — METOPROLOL TARTRATE 100 MG: 100 TABLET, FILM COATED ORAL at 08:19

## 2023-08-09 RX ADMIN — DOCUSATE SODIUM 100 MG: 100 CAPSULE, LIQUID FILLED ORAL at 17:07

## 2023-08-09 RX ADMIN — CEFAZOLIN SODIUM 2000 MG: 2 SOLUTION INTRAVENOUS at 14:31

## 2023-08-09 RX ADMIN — BUDESONIDE AND FORMOTEROL FUMARATE DIHYDRATE 1 PUFF: 160; 4.5 AEROSOL RESPIRATORY (INHALATION) at 17:07

## 2023-08-09 RX ADMIN — HYDRALAZINE HYDROCHLORIDE 10 MG: 10 TABLET, FILM COATED ORAL at 17:07

## 2023-08-09 RX ADMIN — MENTHOL, UNSPECIFIED FORM AND METHYL SALICYLATE: 10; 150 CREAM TOPICAL at 08:19

## 2023-08-09 RX ADMIN — ACETAMINOPHEN 325MG 650 MG: 325 TABLET ORAL at 05:32

## 2023-08-09 RX ADMIN — INSULIN LISPRO 5 UNITS: 100 INJECTION, SOLUTION INTRAVENOUS; SUBCUTANEOUS at 12:24

## 2023-08-09 NOTE — PLAN OF CARE
Problem: OCCUPATIONAL THERAPY ADULT  Goal: Performs self-care activities at highest level of function for planned discharge setting. See evaluation for individualized goals. Description: Treatment Interventions: ADL retraining, Functional transfer training, UE strengthening/ROM, Endurance training, Patient/family training, Equipment evaluation/education, Neuromuscular reeducation, Compensatory technique education, Energy conservation, Activityengagement          See flowsheet documentation for full assessment, interventions and recommendations. Note: Limitation: Decreased ADL status, Decreased UE strength, Decreased Safe judgement during ADL, Decreased UE ROM, Decreased endurance, Decreased self-care trans  Prognosis: Good  Assessment: Salvatore Ramos is a 57 y.o. female seen for OT evaluation s/p admit to [facility] on 8/8/2023 w/ Pulmonary embolism ([address]). Pt also with diabetic foot ulcer B/L. Per Podiatry 8/9/23: Weightbearing to feet for transfers only, otherwise please stay nonweightbearing as much as possible. Comorbidities affecting pt's functional performance at time of assessment include: type 2 diabetes mellitus that is insulin-dependent, blindness, polymyalgia rheumatica requiring daily prednisone, absence seizures, cerebral artery aneurysm, migraines, MOHAN, thoracic artery aneurysm, HTN, HLD, CKD 3, and obesity. Pt with active OT orders and activity orders for Up and OOB as tolerated. Personal factors affecting pt at time of IE include:limited home support, difficulty performing ADLs, difficulty performing IADLs, difficulty performing transfers/mobility, limited insight into deficits, fall risk , functional decline , increased reliance on DME , homelessness  and multiple admissions . Prior to admission, pt is homeless. Reports that recently due to pain in feet, has been staying in [facility] at all times. reports she lost her RW. Son has been helping with transfers and ADLs.  Upon evaluation: Pt currently requires Maureen for UB ADLs, maxA for LB ADLs, maxA for toileting, maxAx1 for bed mobility, maxAx2 for functional transfers, and modAx1 mobility 2* the following deficits impacting occupational performance:weakness, decreased strength , decreased balance, decreased activity tolerance, limited functional reach, decreased safety awareness, increased pain and dizziness. VITALS during session: BP: 120/57 post activity. Pt to benefit from continued skilled OT tx while in the hospital to address deficits as defined above and maximize level of functional independence w ADL's and functional mobility. Occupational Performance areas to address include: grooming, bathing/shower, toilet hygiene, dressing, health maintenance, functional mobility, meal prep and household maintenance. From OT standpoint, recommendation at time of d/c would be post acute rehab services. OT to follow pt on caseload 2-3 x/wk.      OT Discharge Recommendation: Post acute rehabilitation services

## 2023-08-09 NOTE — ASSESSMENT & PLAN NOTE
Lab Results   Component Value Date    HGBA1C 10.2 (A) 07/21/2023       No results for input(s): "POCGLU" in the last 72 hours. Blood Sugar Average: Last 72 hrs:     Patient is severely large right plantar surface diabetic foot ulcer almost the entire length of her foot  I do not see any probable bone though it is not open. Will have podiatry see.   Will need some sort of debridement

## 2023-08-09 NOTE — ASSESSMENT & PLAN NOTE
· Patient has acute pulmonary embolism and they reported lower extremity DVT  · Outpatient venous duplex showed acute non occlusive DVT in the popliteal vein and in the posterior tibial veins  · ECHO ordered   · Continue heparin drip  · Due to right foot plantar ulcer hold off on oral anticoagulants pending podiatry evaluation

## 2023-08-09 NOTE — PLAN OF CARE
Problem: Potential for Falls  Goal: Patient will remain free of falls  Description: INTERVENTIONS:  - Educate patient/family on patient safety including physical limitations  - Instruct patient to call for assistance with activity   - Consult OT/PT to assist with strengthening/mobility   - Keep Call bell within reach  - Keep bed low and locked with side rails adjusted as appropriate  - Keep care items and personal belongings within reach  - Initiate and maintain comfort rounds  - Make Fall Risk Sign visible to staff  - Offer Toileting every  Hours, in advance of need  - Initiate/Maintain alarm  - Obtain necessary fall risk management equipment:   - Apply yellow socks and bracelet for high fall risk patients  - Consider moving patient to room near nurses station  Outcome: Progressing     Problem: MOBILITY - ADULT  Goal: Maintain or return to baseline ADL function  Description: INTERVENTIONS:  -  Assess patient's ability to carry out ADLs; assess patient's baseline for ADL function and identify physical deficits which impact ability to perform ADLs (bathing, care of mouth/teeth, toileting, grooming, dressing, etc.)  - Assess/evaluate cause of self-care deficits   - Assess range of motion  - Assess patient's mobility; develop plan if impaired  - Assess patient's need for assistive devices and provide as appropriate  - Encourage maximum independence but intervene and supervise when necessary  - Involve family in performance of ADLs  - Assess for home care needs following discharge   - Consider OT consult to assist with ADL evaluation and planning for discharge  - Provide patient education as appropriate  Outcome: Progressing  Goal: Maintains/Returns to pre admission functional level  Description: INTERVENTIONS:  - Perform BMAT or MOVE assessment daily.   - Set and communicate daily mobility goal to care team and patient/family/caregiver.    - Collaborate with rehabilitation services on mobility goals if consulted  - Perform Range of Motion  times a day. - Reposition patient every  hours.   - Dangle patient  times a day  - Stand patient  times a day  - Ambulate patient  times a day  - Out of bed to chair  times a day   - Out of bed for meals  times a day  - Out of bed for toileting  - Record patient progress and toleration of activity level   Outcome: Progressing     Problem: PAIN - ADULT  Goal: Verbalizes/displays adequate comfort level or baseline comfort level  Description: Interventions:  - Encourage patient to monitor pain and request assistance  - Assess pain using appropriate pain scale  - Administer analgesics based on type and severity of pain and evaluate response  - Implement non-pharmacological measures as appropriate and evaluate response  - Consider cultural and social influences on pain and pain management  - Notify physician/advanced practitioner if interventions unsuccessful or patient reports new pain  Outcome: Progressing     Problem: INFECTION - ADULT  Goal: Absence or prevention of progression during hospitalization  Description: INTERVENTIONS:  - Assess and monitor for signs and symptoms of infection  - Monitor lab/diagnostic results  - Monitor all insertion sites, i.e. indwelling lines, tubes, and drains  - Monitor endotracheal if appropriate and nasal secretions for changes in amount and color  - [place] appropriate cooling/warming therapies per order  - Administer medications as ordered  - Instruct and encourage patient and family to use good hand hygiene technique  - Identify and instruct in appropriate isolation precautions for identified infection/condition  Outcome: Progressing  Goal: Absence of fever/infection during neutropenic period  Description: INTERVENTIONS:  - Monitor WBC    Outcome: Progressing     Problem: SAFETY ADULT  Goal: Patient will remain free of falls  Description: INTERVENTIONS:  - Educate patient/family on patient safety including physical limitations  - Instruct patient to call for assistance with activity   - Consult OT/PT to assist with strengthening/mobility   - Keep Call bell within reach  - Keep bed low and locked with side rails adjusted as appropriate  - Keep care items and personal belongings within reach  - Initiate and maintain comfort rounds  - Make Fall Risk Sign visible to staff  - Offer Toileting every  Hours, in advance of need  - Initiate/Maintain alarm  - Obtain necessary fall risk management equipment:   - Apply yellow socks and bracelet for high fall risk patients  - Consider moving patient to room near nurses station  Outcome: Progressing  Goal: Maintain or return to baseline ADL function  Description: INTERVENTIONS:  -  Assess patient's ability to carry out ADLs; assess patient's baseline for ADL function and identify physical deficits which impact ability to perform ADLs (bathing, care of mouth/teeth, toileting, grooming, dressing, etc.)  - Assess/evaluate cause of self-care deficits   - Assess range of motion  - Assess patient's mobility; develop plan if impaired  - Assess patient's need for assistive devices and provide as appropriate  - Encourage maximum independence but intervene and supervise when necessary  - Involve family in performance of ADLs  - Assess for home care needs following discharge   - Consider OT consult to assist with ADL evaluation and planning for discharge  - Provide patient education as appropriate  Outcome: Progressing  Goal: Maintains/Returns to pre admission functional level  Description: INTERVENTIONS:  - Perform BMAT or MOVE assessment daily.   - Set and communicate daily mobility goal to care team and patient/family/caregiver. - Collaborate with rehabilitation services on mobility goals if consulted  - Perform Range of Motion  times a day. - Reposition patient every  hours.   - Dangle patient  times a day  - Stand patient  times a day  - Ambulate patient  times a day  - Out of bed to chair  times a day   - Out of bed for meals 3x times a day  - Out of bed for toileting  - Record patient progress and toleration of activity level   Outcome: Progressing     Problem: DISCHARGE PLANNING  Goal: Discharge to home or other facility with appropriate resources  Description: INTERVENTIONS:  - Identify barriers to discharge w/patient and caregiver  - Arrange for needed discharge resources and transportation as appropriate  - Identify discharge learning needs (meds, wound care, etc.)  - Arrange for interpretive services to assist at discharge as needed  - Refer to Case Management Department for coordinating discharge planning if the patient needs post-hospital services based on physician/advanced practitioner order or complex needs related to functional status, cognitive ability, or social support system  Outcome: Progressing     Problem: Knowledge Deficit  Goal: Patient/family/caregiver demonstrates understanding of disease process, treatment plan, medications, and discharge instructions  Description: Complete learning assessment and assess knowledge base.   Interventions:  - Provide teaching at level of understanding  - Provide teaching via preferred learning methods  Outcome: Progressing     Problem: Prexisting or High Potential for Compromised Skin Integrity  Goal: Skin integrity is maintained or improved  Description: INTERVENTIONS:  - Identify patients at risk for skin breakdown  - Assess and monitor skin integrity  - Assess and monitor nutrition and hydration status  - Monitor labs   - Assess for incontinence   - Turn and reposition patient  - Assist with mobility/ambulation  - Relieve pressure over bony prominences  - Avoid friction and shearing  - Provide appropriate hygiene as needed including keeping skin clean and dry  - Evaluate need for skin moisturizer/barrier cream  - Collaborate with interdisciplinary team   - Patient/family teaching  - Consider wound care consult   Outcome: Progressing

## 2023-08-09 NOTE — UTILIZATION REVIEW
Initial Clinical Review    Admission: Date/Time/Statement:   Admission Orders (From admission, onward)     Ordered        08/08/23 1627  INPATIENT ADMISSION  Once                      Orders Placed This Encounter   Procedures   • INPATIENT ADMISSION     Standing Status:   Standing     Number of Occurrences:   1     Order Specific Question:   Level of Care     Answer:   Med Surg [16]     Order Specific Question:   Estimated length of stay     Answer:   More than 2 Midnights     Order Specific Question:   Certification     Answer:   I certify that inpatient services are medically necessary for this patient for a duration of greater than two midnights. See H&P and MD Progress Notes for additional information about the patient's course of treatment. ED Arrival Information     Expected   -    Arrival   8/8/2023 11:36    Acuity   Emergent            Means of arrival   Ambulance    Escorted by   [facility] ([address] [place] [address])    Service   Hospitalist    Admission type   Emergency            Arrival complaint   chest pain           Chief Complaint   Patient presents with   • Chest Pain     Coming in from daybreak, via EMS, homeless, c/o of sharp chest pain, for the past couple of days, sharper yesterday, starts in back and radiates to front, + swelling in extremities, has not been taking water pill, + weeping wound right foot, diabetic, glucose with        Initial Presentation: 57 y.o. female who presented by EMS to [facility] ED. Inpatient admission for evaluation and treatment of PE. PMHx: anemia, arthritis, T2DM, HLD, HTN, asthma, seizures, sleep apnea, CKD S3a. Presented w/ R foot wound and SOB. On exam, diabetic ulcer on plantar surface of R foot, b/l LE edema. CT showed acute b/l PE. Plan: IV heparin gtt, telemetry, continue PTA meds, SSI w/ BG checks ACHS, check blood cultures, start IV ABX. Podiatry consulted.       Date: 08/09/23   Day 2: Reports feeling tired, noting occasional chest pain, poor PO intake. On exam, b/l LE edema, R foot dressing. Plan: telemetry, echo, continue heparin gtt, follow blood and wound cultures, IV ABX, SSI w/ BG checks ACHS, Lantus, Trend labs, replete electrolytes as needed. Podiatry: No acute surgical intervention at this time. Bedside debridement of b/l plantar feet performed. Check b/l foot xrays. Follow wound cultures. Local wound care. Elevate b/l LE when non-ambulatory. WBAT for transfers only -- NWB as much as possible. Date: 08/10/23    Day 3: Has surpassed a 2nd midnight with active treatments and services, which include IV ABX, SSI w/ BG checks ACHS, telemetry, continue current meds, Trend labs, replete electrolytes as needed.     ED Triage Vitals [08/08/23 1146]   Temperature Pulse Respirations Blood Pressure SpO2   98.4 °F (36.9 °C) 102 19 126/68 100 %      Temp Source Heart Rate Source Patient Position - Orthostatic VS BP Location FiO2 (%)   Oral Monitor Sitting Right arm --      Pain Score       No Pain          Wt Readings from Last 1 Encounters:   08/09/23 125 kg (276 lb)     Additional Vital Signs:   Date/Time Temp Pulse Resp BP MAP (mmHg) SpO2 Calculated FIO2 (%) - Nasal Cannula Nasal Cannula O2 Flow Rate (L/min) O2 Device   08/10/23 07:12:37 98.6 °F (37 °C) -- 15 150/74 99 -- -- -- --   08/10/23 06:01:09 -- 68 18 152/72 99 99 % -- -- --   08/10/23 05:09:39 98.5 °F (36.9 °C) 67 -- 131/62 85 94 % -- -- --   08/09/23 2200 -- -- -- -- -- -- 28 2 L/min Nasal cannula   08/09/23 21:08:47 99.2 °F (37.3 °C) 74 18 134/61 85 96 % -- -- --   08/09/23 19:34:31 99.3 °F (37.4 °C) 75 -- 135/61 86 98 % -- -- None (Room air)   08/09/23 15:12:30 98.2 °F (36.8 °C) 66 16 112/60 77 98 % -- -- --   08/09/23 15:03:04 98.5 °F (36.9 °C) 65 18 112/59 77 97 % -- -- --   08/09/23 11:16:22 98.4 °F (36.9 °C) 65 16 110/58 75 95 % -- -- --   08/09/23 0930 -- 65 -- 110/58 -- -- -- -- --     Date/Time Temp Pulse Resp BP MAP (mmHg) SpO2 Calculated FIO2 (%) - Nasal Cannula Nasal Cannula O2 Flow Rate (L/min) O2 Device   08/09/23 07:45:26 98.4 °F (36.9 °C) 64 20 111/58 76 98 % -- -- --   08/09/23 05:36:24 98 °F (36.7 °C) 71 -- 120/63 82 98 % -- -- --   08/09/23 0100 -- -- -- -- -- -- 28 2 L/min Nasal cannula   08/08/23 23:06:03 100.4 °F (38 °C) 94 -- 131/76 94 95 % -- -- --   08/08/23 22:14:47 101.1 °F (38.4 °C) Abnormal  102 -- 134/74 94 95 % -- -- --   08/08/23 2214 -- 101 -- 134/74 94 95 % -- -- --   08/08/23 2210 -- 101 -- 134/78 -- -- -- -- --   08/08/23 1950 -- -- -- -- -- -- -- -- None (Room air)   08/08/23 19:39:52 100.3 °F (37.9 °C) 109 Abnormal  -- 129/73 92 98 % -- -- --   08/08/23 1820 99.8 °F (37.7 °C) 111 Abnormal  20 166/82 110 98 % -- -- --   08/08/23 1700 -- 115 Abnormal  -- 151/69 97 98 % -- -- --   08/08/23 1527 -- 114 Abnormal  -- 176/79 Abnormal  -- 98 % -- -- None (Room air)   08/08/23 1400 -- 109 Abnormal  18 162/74 107 100 % -- -- --   08/08/23 1300 -- 106 Abnormal  32 Abnormal  167/70 100 100 % -- -- --     Pertinent Labs/Diagnostic Test Results:   8/8 - EKG  Sinus tachycardia with occasional Premature ventricular complexes and Fusion complexes  Possible Left atrial enlargement    8/8 - EKG  Sinus tachycardia  Nonspecific ST abnormality    8/9 - Echo  •  Left Ventricle: Left ventricular cavity size is mildly dilated. Wall thickness is mildly increased. There is mild concentric hypertrophy. The left ventricular ejection fraction is 55%. Systolic function is normal. Wall motion is normal. Diastolic function is mildly abnormal, consistent with grade I (abnormal) relaxation. •  Right Ventricle: Right ventricular cavity size is normal. Systolic function is normal.  •  Aortic Valve: There is aortic valve sclerosis. •  Tricuspid Valve: There is mild to moderate regurgitation. The right ventricular systolic pressure is mildly elevated. The estimated right ventricular systolic pressure is 65.63 mmHg. •  Aorta: The aortic root is normal in size.  The ascending aorta is mildly dilated. The ascending aorta is 3.8 cm. •  Pulmonary Artery: The pulmonary artery systolic pressure is mildly increased. •  Prior TTE study available for comparison. Prior study date: 3/1/2023. No significant changes noted compared to the prior study. 8/9 - EKG  Normal sinus rhythm  Minimal voltage criteria for LVH, may be normal variant  Prolonged QT    8/10 - EKG   Normal sinus rhythm  Prolonged QT    XR foot 3+ vw left   Final Result by Kandace Turner MD (08/09 1144)      No radiographic evidence of osteomyelitis. Workstation performed: [id number]         XR foot 3+ vw right   Final Result by Kandace Turner MD (08/09 1144)      No radiographic evidence of osteomyelitis. Workstation performed: [id number]         CTA dissection protocol chest abdomen pelvis w wo contrast   Final Result by Randall Bauer MD (08/08 1611)      No thoracic aortic dissection      Bilateral pulmonary embolism with pulmonary emboli in the right main pulmonary artery, right lower lobe segmental subsegmental vessels and left lower lobe pulmonary artery. Consider follow-up CTA at 3 months to demonstrate resolution   RV LV ratio is less than 0.9      Ventral abdominal wall hernia      Rounded nodular density seen in the outer left breast measuring 1.3 cm probably due to intramammary lymph nodes, correlate with mammography      I personally discussed this study with Kasifran Radha on 8/8/2023 4:05 PM.               Workstation performed: [id number]         VAS lower limb venous duplex study, unilateral/limited   Final Result by Yasmeen Alvarez MD (08/08 2017)     RIGHT LOWER LIMB  No evidence of acute or chronic deep vein thrombosis . No evidence of superficial thrombophlebitis noted. Doppler evaluation shows a normal response to augmentation maneuvers. Popliteal, posterior tibial and anterior tibial arterial Doppler waveform's are  triphasic.      LEFT LOWER LIMB  Evidence of acute, non occlusive deep vein thrombosis identified in the  popliteal vein and in one of the paired posterior tibial veins. No evidence of superficial thrombophlebitis noted. Popliteal, posterior tibial and anterior tibial arterial Doppler waveform's are  triphasic. Tech Note:  There are echogenic structures located in the bilateral inguinal regions. One  on the right measures approximately 3.0 cm and one of the left measures 2.6 cm  both are suggestive of enlarged lymphatic channels. XR chest 1 view portable   Final Result by Sean Vides MD (08/08 1358)      No acute cardiopulmonary disease.                   Workstation performed: [id number]               Results from last 7 days   Lab Units 08/10/23  0512 08/09/23  0628 08/08/23  1302   WBC Thousand/uL 11.62* 12.85* 12.57*   HEMOGLOBIN g/dL 8.6* 8.5* 10.8*   HEMATOCRIT % 28.4* 28.8* 35.7   PLATELETS Thousands/uL 413* 398* 472*   NEUTROS ABS Thousands/µL 7.32 8.39* 9.12*         Results from last 7 days   Lab Units 08/10/23  0512 08/09/23  1848 08/09/23  0628 08/08/23  1302   SODIUM mmol/L 139  --  139 136   POTASSIUM mmol/L 3.6  --  3.3* 3.5   CHLORIDE mmol/L 107  --  107 101   CO2 mmol/L 26  --  28 27   ANION GAP mmol/L 6  --  4 8   BUN mg/dL 14  --  14 19   CREATININE mg/dL 0.83  --  0.86 0.85   EGFR ml/min/1.73sq m 74  --  71 72   CALCIUM mg/dL 8.2*  --  8.4 9.3   MAGNESIUM mg/dL 2.0 2.0 1.7*  --      Results from last 7 days   Lab Units 08/08/23  1302   AST U/L 10*   ALT U/L 7   ALK PHOS U/L 132*   TOTAL PROTEIN g/dL 7.4   ALBUMIN g/dL 3.4*   TOTAL BILIRUBIN mg/dL 0.79     Results from last 7 days   Lab Units 08/10/23  0714 08/09/23  2108 08/09/23  1703 08/09/23  1114 08/09/23  0743 08/08/23  2209 08/08/23  1148   POC GLUCOSE mg/dl 210* 256* 207* 107 94 167* 286*     Results from last 7 days   Lab Units 08/10/23  0512 08/09/23  0628 08/08/23  1302   GLUCOSE RANDOM mg/dL 246* 84 260*     Results from last 7 days   Lab Units 08/08/23  1533 08/08/23  1302   HS TNI 0HR ng/L  --  3   HS TNI 2HR ng/L 3  --    HSTNI D2 ng/L 0  --          Results from last 7 days   Lab Units 08/10/23  0511 08/09/23  1848 08/09/23  1211 08/09/23  0628 08/08/23  2251 08/08/23  1302   PROTIME seconds  --   --   --   --  14.5 13.2   INR   --   --   --   --  1.13 1.00   PTT seconds 94* 86* 89* 92* 160* 36             Results from last 7 days   Lab Units 08/08/23  1533   LACTIC ACID mmol/L 1.8             Results from last 7 days   Lab Units 08/08/23  1302   BNP pg/mL 33     Results from last 7 days   Lab Units 08/08/23  1243   CLARITY UA  Clear   COLOR UA  Yellow   SPEC GRAV UA  1.020   PH UA  5.0   GLUCOSE UA mg/dl 100 (1/10%)*   KETONES UA mg/dl Negative   BLOOD UA  Negative   PROTEIN UA mg/dl >=300*   NITRITE UA  Negative   BILIRUBIN UA  Negative   UROBILINOGEN UA E.U./dl 0.2   LEUKOCYTES UA  Negative   WBC UA /hpf 1-2   RBC UA /hpf 2-4*   BACTERIA UA /hpf Occasional   EPITHELIAL CELLS WET PREP /hpf Occasional   MUCUS THREADS  Occasional*     Results from last 7 days   Lab Units 08/09/23  0945 08/08/23  2251   BLOOD CULTURE   --  No Growth at 24 hrs. No Growth at 24 hrs. GRAM STAIN RESULT  3+ Polys*  2+ Gram positive rods*  --        ED Treatment:   Medication Administration from 08/08/2023 1136 to 08/08/2023 1752       Date/Time Order Dose Route Action     08/08/2023 1257 EDT sodium chloride 0.9 % bolus 1,000 mL 1,000 mL Intravenous New Bag     08/08/2023 1509 EDT iohexol (OMNIPAQUE) 350 MG/ML injection (SINGLE-DOSE) 100 mL 100 mL Intravenous Given     08/08/2023 1700 EDT heparin (porcine) injection 10,000 Units 10,000 Units Intravenous Given     08/08/2023 1702 EDT heparin (porcine) 25,000 units in 0.45% NaCl 250 mL infusion (premix) 18 Units/kg/hr Intravenous New Bag        Past Medical History:   Diagnosis Date   • Anemia    • Arthritis    • Benign hypertension     Procedure/Onset: 01/01/2005; Description: BP elevated today. Pt reports running out of BP meds 2wks ago. Will resume BP meds.    • Diabetes mellitus ([facility])    • Diabetes mellitus type 2 with complications, uncontrolled 9/8/2015   • Dyspnea on exertion    • Hyperlipidemia    • Hypertension    • Legally blind 2010   • Mild intermittent asthma with exacerbation 8/4/2018   • Miscarriage     x 3   • Polymyalgia rheumatica ([facility]) 11/24/2021   • Seizures ([facility])    • Sickle cell trait ([facility])    • Sleep apnea    • Stage 3a chronic kidney disease ([facility]) 5/19/2022   • Thyroid nodule 3/6/2020     Present on Admission:  • Uncontrolled type 2 diabetes mellitus with hyperglycemia ([facility])  • PMR (polymyalgia rheumatica) ([facility])      Admitting Diagnosis: Bilateral pulmonary embolism ([facility]) [I26.99]  Diabetic ulcer of right midfoot associated with type 2 diabetes mellitus, limited to breakdown of skin ([facility]) [T46.569, L97.411]  Age/Sex: 57 y.o. female  Admission Orders:  Consistent Carbohydrate Diet. Blood glucose checks ACHS. Telemetry. Activity as tolerated, up and OOB as tolerated.      Scheduled Medications:  amLODIPine, 5 mg, Oral, Daily  apixaban, 10 mg, Oral, BID  aspirin, 81 mg, Oral, Daily  atorvastatin, 40 mg, Oral, Daily With Dinner  budesonide-formoterol, 1 puff, Inhalation, BID  cefazolin, 2,000 mg, Intravenous, Q8H  cholecalciferol, 2,000 Units, Oral, Daily  docusate sodium, 100 mg, Oral, BID  furosemide, 20 mg, Oral, BID  gabapentin, 100 mg, Oral, BID  hydrALAZINE, 10 mg, Oral, TID  insulin glargine, 25 Units, Subcutaneous, Q12H RONI  insulin lispro, 1-5 Units, Subcutaneous, HS  insulin lispro, 1-6 Units, Subcutaneous, TID AC  insulin lispro, 5 Units, Subcutaneous, TID With Meals  losartan, 100 mg, Oral, Daily  metoprolol tartrate, 100 mg, Oral, Q12H RONI  predniSONE, 4 mg, Oral, Daily  topiramate, 100 mg, Oral, HS    Continuous IV Infusions:    heparin (porcine), 3-30 Units/kg/hr (Order-Specific), Intravenous, Titrated    PRN Meds:  acetaminophen, 650 mg, Oral, Q6H PRN; 8/8 x1, 8/9 x1, 8/10 x1  loperamide, 2 mg, Oral, 4x Daily PRN  magnesium sulfate, 2 g, Intravenous; 8/9 x1  menthol-methyl salicylate, , Apply externally, 4x Daily PRN; 8/9 x1  ondansetron, 4 mg, Intravenous, Q6H PRN  potassium chloride, 40 mEq, Oral; 8/9 x1, 8/10 x1        IP CONSULT TO PODIATRY    Network Utilization Review Department  ATTENTION: Please call with any questions or concerns to [phone] and carefully listen to the prompts so that you are directed to the right person. All voicemails are confidential.  Muna Rasmussen all requests for admission clinical reviews, approved or denied determinations and any other requests to dedicated fax number below belonging to the campus where the patient is receiving treatment.  List of dedicated fax numbers for the Facilities:  [place] DENIALS (Administrative/Medical Necessity) [phone] [address] (Maternity/NICU/Pediatrics) [phone]   [address] [place] [address] Drive [phone]   [facility] [address] [facility] [phone] [address] [place] [address] Road [address] [place] [address] [phone]   [address] [facility] [place] [facility] [address] [place] [address] [phone]

## 2023-08-09 NOTE — PLAN OF CARE
Problem: PHYSICAL THERAPY ADULT  Goal: Performs mobility at highest level of function for planned discharge setting. See evaluation for individualized goals. Description: Treatment/Interventions: Functional transfer training, LE strengthening/ROM, Therapeutic exercise, Endurance training, Patient/family training, Bed mobility, Spoke to nursing, OT, Family, Gait training  Equipment Recommended: Wheelchair, Philippe Heath (pt has w/c)       See flowsheet documentation for full assessment, interventions and recommendations. Note: Prognosis: Fair  Problem List: Decreased strength, Decreased endurance, Impaired balance, Decreased mobility, Obesity, Pain, Decreased skin integrity  Assessment: Pt. 57 y. o.female presented with R foot wound and SOB. CT chest showed an acute pulmonary emboli. Pt admitted for Pulmonary embolism ([address]) w/ diabetic foot ulcers. Pt referred to PT for mobility assessment & D/C planning w/ orders of Activity as tolerated. Please see above for information re: home set-up & PLOF as well as objective findings during PT assessment. At baseline, pt is homeless; pt reports being w/c bound; sleeps in w/c; requires assistance w/ w/c mobility; pt reports she pulls on the grab bars to stand & pivots to transfer from w/c<>toilet. On eval, pt functioning below baseline hence will continue skilled PT to improve function & safety. Pt require maxAx1 for bed mobility & transfers; modAx1 for amb w/ RW + cues for techniques & safety. Gait deviations as above, slow & unsteady w/ dec foot clearance but no gross LOB noted. Pt require inc time to complete overall tasks. The patient's AM-PAC Basic Mobility Inpatient Short Form Raw Score is 9. A Raw score of less than or equal to 16 suggests the patient may benefit from discharge to post-acute rehabilitation services. Please also refer to the recommendation of the Physical Therapist for safe discharge planning.  From PT standpoint, due to above mentioned deficits & high risk for falls, pt will benefit from inpt rehab at D/C. No SOB reported t/o session. (+) dizziness after amb but relieved w/ rest, /57. Nsg staff most recent vital signs as follows: /58   Pulse 65   Temp 98.4 °F (36.9 °C)   Resp 16   Ht 5' 8" (1.727 m)   Wt 125 kg (276 lb)   SpO2 95%   BMI 41.97 kg/m² . At end of session, pt OOB in chair in stable condition, call bell & phone in reach, all lines intact. Fall precautions reinforced w/ good understanding. CM to follow. Nsg staff to continue to mobilized pt (OOB in chair for all meals) as tolerated to prevent further decline in function. Nsg notified. Co-eval was necessary to complete this PT eval for the pt's best interest given pt's medical acuity & complexity. Barriers to Discharge: Other (Comment)  Barriers to Discharge Comments: homelessness  PT Discharge Recommendation: Post acute rehabilitation services    See flowsheet documentation for full assessment.

## 2023-08-09 NOTE — ASSESSMENT & PLAN NOTE
Lab Results   Component Value Date    HGBA1C 10.2 (A) 07/21/2023       Recent Labs     08/08/23  1148 08/08/23  2209 08/09/23  0743 08/09/23  1114   POCGLU 286* 167* 94 107       Blood Sugar Average: Last 72 hrs:  (P) 163.5     · Continue Lantus 25 units and 3 times daily Humalog.   Add Humalog insulin sliding scale

## 2023-08-09 NOTE — ASSESSMENT & PLAN NOTE
Lab Results   Component Value Date    HGBA1C 10.2 (A) 07/21/2023       Recent Labs     08/08/23  1148 08/08/23  2209 08/09/23  0743 08/09/23  1114   POCGLU 286* 167* 94 107       Blood Sugar Average: Last 72 hrs:  (P) 163.5   · large right plantar surface diabetic foot ulcer almost the entire length of her foot no exposed bone noted   · Continue on ancef   · Follow blood cultures and wound cultures   · X rays bilateral feet with no evidence of osteomyelitis   · Podiatry consulted

## 2023-08-09 NOTE — ASSESSMENT & PLAN NOTE
Patient has acute pulmonary embolism and they reported lower extremity DVT    Started on IV heparin drip    Patient also has a significant ulcer in the right plantar surface of her foot that likely needs surgical debridement  So hold off on adding oral anticoagulants till podiatry decides what options she has

## 2023-08-09 NOTE — ASSESSMENT & PLAN NOTE
Lab Results   Component Value Date    HGBA1C 10.2 (A) 07/21/2023       No results for input(s): "POCGLU" in the last 72 hours. Blood Sugar Average: Last 72 hrs:       Restart her home Lantus and 3 times daily Humalog.   Add Humalog insulin sliding scale    Titrate based on blood sugars

## 2023-08-09 NOTE — SEPSIS NOTE
Overnight: 08/08/23:    Sepsis met, febrile, leukocytosis.    Source: likely foot    Plan:  • Draw blood cultures  • Start Ancef

## 2023-08-09 NOTE — PHYSICAL THERAPY NOTE
PT EVALUATION    Pt. Name: Humble Patel  Pt. Age: 57 y.o. MRN: [id number]  LENGTH OF STAY: 1      Admitting Diagnoses:   Bilateral pulmonary embolism (HCC) [I26.99]  Diabetic ulcer of right midfoot associated with type 2 diabetes mellitus, limited to breakdown of skin ([address]) [O25.755, L97.411]    Past Medical History:   Diagnosis Date    Anemia     Arthritis     Benign hypertension     Procedure/Onset: 01/01/2005; Description: BP elevated today. Pt reports running out of BP meds 2wks ago. Will resume BP meds. Diabetes mellitus ([address])     Diabetes mellitus type 2 with complications, uncontrolled 9/8/2015    Dyspnea on exertion     Hyperlipidemia     Hypertension     Legally blind 2010    Mild intermittent asthma with exacerbation 8/4/2018    Miscarriage     x 3    Polymyalgia rheumatica ([address]) 11/24/2021    Seizures ([address])     Sickle cell trait ([address])     Sleep apnea     Stage 3a chronic kidney disease ([address]) 5/19/2022    Thyroid nodule 3/6/2020       Past Surgical History:   Procedure Laterality Date    CATARACT EXTRACTION Bilateral     august and september    EYE SURGERY      HYSTERECTOMY      37    OOPHORECTOMY Left     37    PA LIGATION/BIOPSY TEMPORAL ARTERY Bilateral 10/17/2019    Procedure: BIOPSY ARTERY TEMPORAL;  Surgeon: Florinda Robins DO;  Location: [facility];  Service: Vascular    US GUIDED THYROID BIOPSY  5/13/2020       Imaging Studies:  XR foot 3+ vw left   Final Result by Cecilia Chester MD (08/09 4904)      No radiographic evidence of osteomyelitis. Workstation performed: [id number]         XR foot 3+ vw right   Final Result by Cecilia Chester MD (08/09 6364)      No radiographic evidence of osteomyelitis.       Workstation performed: [id number]         CTA dissection protocol chest abdomen pelvis w wo contrast   Final Result by Lizbeth Santiago MD (08/08 1611)      No thoracic aortic dissection      Bilateral pulmonary embolism with pulmonary emboli in the right main pulmonary artery, right lower lobe segmental subsegmental vessels and left lower lobe pulmonary artery. Consider follow-up CTA at 3 months to demonstrate resolution   RV LV ratio is less than 0.9      Ventral abdominal wall hernia      Rounded nodular density seen in the outer left breast measuring 1.3 cm probably due to intramammary lymph nodes, correlate with mammography      I personally discussed this study with Johnie Troncosoy on 8/8/2023 4:05 PM.               Workstation performed: [id number]         VAS lower limb venous duplex study, unilateral/limited   Final Result by Micheal López MD (08/08 2017)      XR chest 1 view portable   Final Result by Sagar Encarnacion MD (08/08 1358)      No acute cardiopulmonary disease. Workstation performed: [id number]         VAS lower limb venous duplex study, unilateral/limited    (Results Pending)         08/09/23 1151   PT Last Visit   PT Visit Date 08/09/23   Note Type   Note type Evaluation   Pain Assessment   Pain Score 10 - Worst Possible Pain   Pain Location/Orientation Orientation: Bilateral;Location: [facility]   Hospital Pain Intervention(s) Repositioned; Ambulation/increased activity; Emotional support; Rest   Restrictions/Precautions   Weight Bearing Precautions Per Order No   Other Precautions Fall Risk;Pain   Home Living   Type of [address] [place] [address]   Prior Function   Level of [place] Needs assistance with functional mobility   Lives With Son   Receives Help From [facility] in the last 6 months 0   Comments Pt reports being w/c bound at baseline. Pt requires assistance for w/c management & propulsion. Pt sleeps in her w/c. Pt reports she pulls on the grab bars to stand & pivots to transfer from w/c<>toilet.  Pt reports (+) toe unloading shoe R foot & surgical shoe L foot at baseline due diabetic foot ulcers (unable to locate shoes currently)   General   Additional Pertinent History Pt's most recent hospitalization: 5/28-5/30/23 for homelessness, L renal ulcer, renal insufficiency; 4/11-4/18/23 for sepsis & calculus in gallbladder; 2/28-3/18/23 for weakness & abnormal UA. Family/Caregiver Present Yes  (son)   Cognition   Overall Cognitive Status WFL   Arousal/Participation Alert   Orientation Level Oriented to person;Oriented to place;Oriented to time   Following Commands Follows one step commands without difficulty   Comments pleasant & cooperative   Subjective   Subjective Pt agreeable to PT/OT evals. RUE Assessment   RUE Assessment   (refer to OT)   LUE Assessment   LUE Assessment   (refer to OT)   RLE Assessment   RLE Assessment X  (3-/5 grossly)   LLE Assessment   LLE Assessment X  (3-/5 grossly)   Bed Mobility   Supine to Sit 2  Maximal assistance   Additional items Assist x 1;HOB elevated; Bedrails; Increased time required;Verbal cues;LE management   Additional Comments cues for techniques & safety   Transfers   Sit to Stand 2  Maximal assistance   Additional items Assist x 1; Increased time required;Verbal cues   Stand to Sit 2  Maximal assistance   Additional items Assist x 1; Increased time required;Verbal cues   Additional Comments cues for techniques & safety; w/ RW   Ambulation/Elevation   Gait pattern Forward Flexion; Wide EUGENIE; Decreased foot clearance;Shuffling; Short stride; Step to;Excessively slow   Gait Assistance 3  Moderate assist   Additional items Assist x 1;Verbal cues; Tactile cues   Assistive Device Rolling walker   Distance 2'x1   Ambulation/Elevation Additional Comments unsteady gait but no gross LOB noted; require inc time to complete tasks   Balance   Static Sitting Fair +   Dynamic Sitting Fair   Static Standing Poor +   Dynamic Standing Poor   Ambulatory Poor -   Endurance Deficit   Endurance Deficit Yes   Endurance Deficit Description weakness   Activity Tolerance   Activity Tolerance Patient limited by fatigue;Patient limited by pain;Treatment limited secondary to medical complications (Comment)   Medical Staff Made Aware Caitie Diallo   Nurse Made Aware BRISA Rodriguez   Assessment   Prognosis Fair   Problem List Decreased strength;Decreased endurance; Impaired balance;Decreased mobility;Obesity;Pain;Decreased skin integrity   Assessment Pt. 62 y. o.female presented with R foot wound and SOB. CT chest showed an acute pulmonary emboli. Pt admitted for Pulmonary embolism ([address]) w/ diabetic foot ulcers. Pt referred to PT for mobility assessment & D/C planning w/ orders of Activity as tolerated. Please see above for information re: home set-up & PLOF as well as objective findings during PT assessment. At baseline, pt is homeless; pt reports being w/c bound; sleeps in w/c; requires assistance w/ w/c mobility; pt reports she pulls on the grab bars to stand & pivots to transfer from w/c<>toilet. On eval, pt functioning below baseline hence will continue skilled PT to improve function & safety. Pt require maxAx1 for bed mobility & transfers; modAx1 for amb w/ RW + cues for techniques & safety. Gait deviations as above, slow & unsteady w/ dec foot clearance but no gross LOB noted. Pt require inc time to complete overall tasks. The patient's AM-PAC Basic Mobility Inpatient Short Form Raw Score is 9. A Raw score of less than or equal to 16 suggests the patient may benefit from discharge to post-acute rehabilitation services. Please also refer to the recommendation of the Physical Therapist for safe discharge planning. From PT standpoint, due to above mentioned deficits & high risk for falls, pt will benefit from inpt rehab at D/C. No SOB reported t/o session. (+) dizziness after amb but relieved w/ rest, /57. Nsg staff most recent vital signs as follows: /58   Pulse 65   Temp 98.4 °F (36.9 °C)   Resp 16   Ht 5' 8" (1.727 m)   Wt 125 kg (276 lb)   SpO2 95%   BMI 41.97 kg/m² . At end of session, pt OOB in chair in stable condition, call bell & phone in reach, all lines intact.  Fall precautions reinforced w/ good understanding. CM to follow. Nsg staff to continue to mobilized pt (OOB in chair for all meals) as tolerated to prevent further decline in function. Nsg notified. Co-eval was necessary to complete this PT eval for the pt's best interest given pt's medical acuity & complexity. Barriers to Discharge Other (Comment)   Barriers to Discharge Comments homelessness   Goals   Patient Goals to get her own place   STG Expiration Date 08/23/23   Short Term Goal #1 Goals to be met in 14 days; pt will be able to: 1) inc strength & balance by 1/2 grade to improve overall functional mobility & dec fall risk; 2) inc bed mobility to minAx1 for pt to be able to get in/OOB safely w/ proper techniques 100% of the time, to dec caregiver burden & safely function at home; 3) inc transfers to minAx1 for pt to transition safely from one surface to another w/o % of the time, to dec caregiver burden & safely function at home; 4) inc amb w/ RW approx. >20' w/ minAx1 for pt to ambulate as tolerated w/o any % of the time, to dec caregiver burden & safely function at home; 5) pt/caregiver ed   PT Treatment Day 0   Plan   Treatment/Interventions Functional transfer training;LE strengthening/ROM; Therapeutic exercise; Endurance training;Patient/family training;Bed mobility;Spoke to nursing;OT;Family;Gait training   PT Frequency 3-5x/wk   Recommendation   PT Discharge Recommendation Post acute rehabilitation services   Equipment Recommended Wheelchair;Walker  (pt has w/c)   Walker Package Recommended Wheeled walker   AM-PAC Basic Mobility Inpatient   Turning in Flat Bed Without Bedrails 2   Lying on Back to Sitting on Edge of Flat Bed Without Bedrails 1   Moving Bed to Chair 2   Standing Up From Chair Using Arms 1   Walk in Room 2   Climb 3-5 Stairs With Railing 1   Basic Mobility Inpatient Raw Score 9   Turning Head Towards Sound 4   Follow Simple Instructions 3   Low Function Basic Mobility Raw Score 16   Low Function Basic Mobility Standardized Score  25.72   Highest Level Of Mobility   -[facility] Goal 3: Sit at edge of bed   -HL Achieved 4: Move to chair/commode   End of Consult   Patient Position at End of Consult Bedside chair; All needs within reach   End of Consult Comments Pt in stable condition. All needs in reach.    Hx/personal factors: co-morbidities, mutliple lines, use of AD, pain, h/o of falls, fall risk, assist w/ ADL's, and obesity; homelessness  Examination: dec mobility, dec balance, dec endurance, dec amb, risk for falls, pain  Clinical: unpredictable (ongoing medical status, abnormal lab values, risk for falls, and pain mgt)  Complexity: high    [employer]

## 2023-08-09 NOTE — PLAN OF CARE
Problem: Potential for Falls  Goal: Patient will remain free of falls  Description: INTERVENTIONS:  - Educate patient/family on patient safety including physical limitations  - Instruct patient to call for assistance with activity   - Consult OT/PT to assist with strengthening/mobility   - Keep Call bell within reach  - Keep bed low and locked with side rails adjusted as appropriate  - Keep care items and personal belongings within reach  - Initiate and maintain comfort rounds  - Make Fall Risk Sign visible to staff  - Offer Toileting every 2 Hours, in advance of need  - Initiate/Maintain bed alarm  - Obtain necessary fall risk management equipment. - Apply yellow socks and bracelet for high fall risk patients  - Consider moving patient to room near nurses station  Outcome: Progressing     Problem: MOBILITY - ADULT  Goal: Maintain or return to baseline ADL function  Description: INTERVENTIONS:  -  Assess patient's ability to carry out ADLs; assess patient's baseline for ADL function and identify physical deficits which impact ability to perform ADLs (bathing, care of mouth/teeth, toileting, grooming, dressing, etc.)  - Assess/evaluate cause of self-care deficits   - Assess range of motion  - Assess patient's mobility; develop plan if impaired  - Assess patient's need for assistive devices and provide as appropriate  - Encourage maximum independence but intervene and supervise when necessary  - Involve family in performance of ADLs  - Assess for home care needs following discharge   - Consider OT consult to assist with ADL evaluation and planning for discharge  - Provide patient education as appropriate  Outcome: Progressing  Goal: Maintains/Returns to pre admission functional level  Description: INTERVENTIONS:  - Perform BMAT or MOVE assessment daily.   - Set and communicate daily mobility goal to care team and patient/family/caregiver.    - Collaborate with rehabilitation services on mobility goals if consulted  - Perform Range of Motion 2 times a day. - Reposition patient every 2 hours.   - Dangle patient 2 times a day  - Stand patient 2 times a day  - Ambulate patient 2 times a day  - Out of bed to chair 2 times a day   - Out of bed for meals 2 times a day  - Out of bed for toileting  - Record patient progress and toleration of activity level   Outcome: Progressing     Problem: PAIN - ADULT  Goal: Verbalizes/displays adequate comfort level or baseline comfort level  Description: Interventions:  - Encourage patient to monitor pain and request assistance  - Assess pain using appropriate pain scale  - Administer analgesics based on type and severity of pain and evaluate response  - Implement non-pharmacological measures as appropriate and evaluate response  - Consider cultural and social influences on pain and pain management  - Notify physician/advanced practitioner if interventions unsuccessful or patient reports new pain  Outcome: Progressing     Problem: INFECTION - ADULT  Goal: Absence or prevention of progression during hospitalization  Description: INTERVENTIONS:  - Assess and monitor for signs and symptoms of infection  - Monitor lab/diagnostic results  - Monitor all insertion sites, i.e. indwelling lines, tubes, and drains  - Monitor endotracheal if appropriate and nasal secretions for changes in amount and color  - [place] appropriate cooling/warming therapies per order  - Administer medications as ordered  - Instruct and encourage patient and family to use good hand hygiene technique  - Identify and instruct in appropriate isolation precautions for identified infection/condition  Outcome: Progressing  Goal: Absence of fever/infection during neutropenic period  Description: INTERVENTIONS:  - Monitor WBC    Outcome: Progressing     Problem: SAFETY ADULT  Goal: Patient will remain free of falls  Description: INTERVENTIONS:  - Educate patient/family on patient safety including physical limitations  - Instruct patient to call for assistance with activity   - Consult OT/PT to assist with strengthening/mobility   - Keep Call bell within reach  - Keep bed low and locked with side rails adjusted as appropriate  - Keep care items and personal belongings within reach  - Initiate and maintain comfort rounds  - Make Fall Risk Sign visible to staff  - Offer Toileting every 2 Hours, in advance of need  - Initiate/Maintain bed alarm  - Obtain necessary fall risk management equipment. - Apply yellow socks and bracelet for high fall risk patients  - Consider moving patient to room near nurses station  Outcome: Progressing  Goal: Maintain or return to baseline ADL function  Description: INTERVENTIONS:  -  Assess patient's ability to carry out ADLs; assess patient's baseline for ADL function and identify physical deficits which impact ability to perform ADLs (bathing, care of mouth/teeth, toileting, grooming, dressing, etc.)  - Assess/evaluate cause of self-care deficits   - Assess range of motion  - Assess patient's mobility; develop plan if impaired  - Assess patient's need for assistive devices and provide as appropriate  - Encourage maximum independence but intervene and supervise when necessary  - Involve family in performance of ADLs  - Assess for home care needs following discharge   - Consider OT consult to assist with ADL evaluation and planning for discharge  - Provide patient education as appropriate  Outcome: Progressing  Goal: Maintains/Returns to pre admission functional level  Description: INTERVENTIONS:  - Perform BMAT or MOVE assessment daily.   - Set and communicate daily mobility goal to care team and patient/family/caregiver. - Collaborate with rehabilitation services on mobility goals if consulted  - Perform Range of Motion 2 times a day. - Reposition patient every 2 hours.   - Dangle patient 2 times a day  - Stand patient 2 times a day  - Ambulate patient 2 times a day  - Out of bed to chair 2 times a day   - Out of bed for meals 2 times a day  - Out of bed for toileting  - Record patient progress and toleration of activity level   Outcome: Progressing     Problem: DISCHARGE PLANNING  Goal: Discharge to home or other facility with appropriate resources  Description: INTERVENTIONS:  - Identify barriers to discharge w/patient and caregiver  - Arrange for needed discharge resources and transportation as appropriate  - Identify discharge learning needs (meds, wound care, etc.)  - Arrange for interpretive services to assist at discharge as needed  - Refer to Case Management Department for coordinating discharge planning if the patient needs post-hospital services based on physician/advanced practitioner order or complex needs related to functional status, cognitive ability, or social support system  Outcome: Progressing     Problem: Knowledge Deficit  Goal: Patient/family/caregiver demonstrates understanding of disease process, treatment plan, medications, and discharge instructions  Description: Complete learning assessment and assess knowledge base.   Interventions:  - Provide teaching at level of understanding  - Provide teaching via preferred learning methods  Outcome: Progressing

## 2023-08-09 NOTE — PROGRESS NOTES
MITZI ONEILL received Ib message from [facility] [place]. regarding assistance pt apply for Housing as pt is homeless and [facility]. [phone] attempts multiple times to contact pt to further assess. Letter was not sent as pt is homeless. [facility] closed the referral due to lack of response. Per chart review pt is currently is hospitalized. MITZI ONEILL will remains available and await for d/c plan.

## 2023-08-09 NOTE — OCCUPATIONAL THERAPY NOTE
Occupational Therapy Evaluation     Patient Name: Dee Dee GOLDMAN Date: 8/9/2023  Problem List  Principal Problem:    Pulmonary embolism ([address])  Active Problems:    Uncontrolled type 2 diabetes mellitus with hyperglycemia (HCC)    QT prolongation    PMR (polymyalgia rheumatica) (HCC)    Diabetic foot ulcer ([address])    Past Medical History  Past Medical History:   Diagnosis Date    Anemia     Arthritis     Benign hypertension     Procedure/Onset: 01/01/2005; Description: BP elevated today. Pt reports running out of BP meds 2wks ago. Will resume BP meds. Diabetes mellitus ([address])     Diabetes mellitus type 2 with complications, uncontrolled 9/8/2015    Dyspnea on exertion     Hyperlipidemia     Hypertension     Legally blind 2010    Mild intermittent asthma with exacerbation 8/4/2018    Miscarriage     x 3    Polymyalgia rheumatica ([address]) 11/24/2021    Seizures ([address])     Sickle cell trait ([address])     Sleep apnea     Stage 3a chronic kidney disease ([address]) 5/19/2022    Thyroid nodule 3/6/2020     Past Surgical History  Past Surgical History:   Procedure Laterality Date    CATARACT EXTRACTION Bilateral     august and september    EYE SURGERY      HYSTERECTOMY      37    OOPHORECTOMY Left     37    [place] LIGATION/BIOPSY TEMPORAL ARTERY Bilateral 10/17/2019    Procedure: BIOPSY ARTERY TEMPORAL;  Surgeon: Danielle Klein DO;  [facility]: [facility];  Service: Vascular    US GUIDED THYROID BIOPSY  5/13/2020 08/09/23 1126   OT Last Visit   OT Visit Date 08/09/23   Note Type   Note type Evaluation   Additional Comments Pt greeted in supine and agreeable to skilled OT evaluation. son at bedside.    Pain Assessment   Pain Assessment Tool 0-10   Pain Score 10 - Worst Possible Pain   Pain Location/Orientation Orientation: Bilateral;Location: Foot   Restrictions/Precautions   Weight Bearing Precautions Per Order Yes   RLE Weight Bearing Per Order   (Per Podiatry 8/9/23: Weightbearing to feet for transfers only, otherwise please stay nonweightbearing as much as possible.)   LLE Weight Bearing Per Order   (Per Podiatry 8/9/23: Weightbearing to feet for transfers only, otherwise please stay nonweightbearing as much as possible.)   Other Precautions Pain; Fall Risk   Home Living   Type of [address] [place] [address]  (reports she lost RW)   Prior Function   Level of [place] Independent with ADLs; Needs assistance with ADLs; Needs assistance with functional mobility  (reports recently requiring A from son to push her in [facility] and transfers and ADLS at times.)   Lives With Son   [address] in the last 6 months 0   Comments Prior to admission, pt is homeless. Reports that recently due to pain in feet, has been staying in [facility] at all times. reports she lost her RW. Son has been helping with transfers and ADLs. ADL   Where Assessed Edge of bed   Eating Assistance 5  Supervision/Setup   Grooming Assistance 5  Supervision/Setup   UB Bathing Assistance 3  Moderate Assistance   LB Bathing Assistance 2  Maximal [place] 3  Moderate Assistance   LB Dressing Assistance 2  Maximal [address] [place]  2  Maximal Assistance   Bed Mobility   Supine to Sit 2  Maximal assistance   Additional items Assist x 1;HOB elevated; Bedrails; Increased time required;Verbal cues;LE management   Additional Comments left seated up in chair following session. call light in reach. Transfers   Sit to Stand 2  Maximal assistance   Additional items Assist x 1   Stand to Sit 2  Maximal assistance   Additional items Assist x 1   Stand pivot 2  Maximal assistance   Additional items Assist x 1; Increased time required;Verbal cues  (RW)   Additional Comments cues for safety, hand placement and best tech. Functional Mobility   Additional Comments per podiatry non amb at this time. transfers only.    Balance   Static Sitting Fair +   Dynamic Sitting Fair   Static Standing Poor + Dynamic Standing Poor   Ambulatory Poor -   Activity Tolerance   Activity Tolerance Patient limited by fatigue;Patient limited by pain;Treatment limited secondary to medical complications (Comment)   Medical Staff Made Aware PT Seven. Nurse Made Aware BRISA Alvarez Dear   RUE Assessment   RUE Assessment X  (Limitations at shoulder.)   LUE Assessment   LUE Assessment X  (limitations at shoulder.)   Hand Function   Gross Motor Coordination Impaired   Fine Motor Coordination Impaired   Psychosocial   Psychosocial (WDL) WDL   Cognition   Overall Cognitive Status WFL   Arousal/Participation Cooperative   Attention Within functional limits   Orientation Level Oriented to person;Oriented to place;Oriented to time   Following Commands Follows one step commands without difficulty   Comments pleasant and cooperative. limited by pain. Assessment   Limitation Decreased ADL status; Decreased UE strength;Decreased Safe judgement during ADL;Decreased UE ROM; Decreased endurance;Decreased self-care trans   Prognosis Good   Assessment Joselin Alfonso is a 57 y.o. female seen for OT evaluation s/p admit to [facility] on 8/8/2023 w/ Pulmonary embolism ([address]). Pt also with diabetic foot ulcer B/L. Per Podiatry 8/9/23: Weightbearing to feet for transfers only, otherwise please stay nonweightbearing as much as possible. Comorbidities affecting pt's functional performance at time of assessment include: type 2 diabetes mellitus that is insulin-dependent, blindness, polymyalgia rheumatica requiring daily prednisone, absence seizures, cerebral artery aneurysm, migraines, MOHAN, thoracic artery aneurysm, HTN, HLD, CKD 3, and obesity. Pt with active OT orders and activity orders for Up and OOB as tolerated.  Personal factors affecting pt at time of IE include:limited home support, difficulty performing ADLs, difficulty performing IADLs, difficulty performing transfers/mobility, limited insight into deficits, fall risk , functional decline , increased reliance on DME , homelessness  and multiple admissions . Prior to admission, pt is homeless. Reports that recently due to pain in feet, has been staying in [facility] at all times. reports she lost her RW. Son has been helping with transfers and ADLs. Upon evaluation: Pt currently requires Maureen for UB ADLs, maxA for LB ADLs, maxA for toileting, maxAx1 for bed mobility, maxAx2 for functional transfers, and modAx1 mobility 2* the following deficits impacting occupational performance:weakness, decreased strength , decreased balance, decreased activity tolerance, limited functional reach, decreased safety awareness, increased pain and dizziness. VITALS during session: BP: 120/57 post activity. Pt to benefit from continued skilled OT tx while in the hospital to address deficits as defined above and maximize level of functional independence w ADL's and functional mobility. Occupational Performance areas to address include: grooming, bathing/shower, toilet hygiene, dressing, health maintenance, functional mobility, meal prep and household maintenance. From OT standpoint, recommendation at time of d/c would be post acute rehab services. OT to follow pt on caseload 2-3 x/wk. Goals   Patient Goals to get her own apt.    STG Time Frame 3-5   Short Term Goal #1 Pt will improve activity tolerance to G for min 30 min txment sessions for increase engagement in functional tasks   Short Term Goal #2 Pt will complete bed mobility at a Mod I level w/ G balance/safety demonstrated to decrease caregiver assistance required   Short Term Goal  Pt will complete toileting w/ mod I w/ G hygiene/thoroughness using DME as needed   LTG Time Frame 10-14   Long Term Goal #1 Pt will complete LB dressing/self care w/ mod I using adaptive device and DME as needed   Long Term Goal #2 Pt will improve functional transfers to Mod I on/off all surfaces using DME as needed w/ G balance/safety   Long Term Goal Pt will increase BUE strength by 1MM grade via AROM/AAROM/PROM exercises to increase independence in ADLs and transfers   Plan   Treatment Interventions ADL retraining;Functional transfer training;UE strengthening/ROM; Endurance training;Patient/family training;Equipment evaluation/education; Neuromuscular reeducation; Compensatory technique education; Energy conservation; Activityengagement   Goal Expiration Date 08/23/23   OT Treatment Day 0   OT Frequency 2-3x/wk   Recommendation   OT Discharge Recommendation Post acute rehabilitation services   Additional Comments  The patient's raw score on the AM-PAC Daily Activity Inpatient Short Form is 16. A raw score of less than 19 suggests the patient may benefit from discharge to post-acute rehabilitation services. Please refer to the recommendation of the Occupational Therapist for safe discharge planning.    AM-PAC Daily Activity Inpatient   Lower Body Dressing 2   Bathing 2   Toileting 2   Upper Body Dressing 3   Grooming 3   Eating 4   Daily Activity Raw Score 16   Daily Activity Standardized Score (Calc for Raw Score >=11) 35.96   AM-PAC Applied Cognition Inpatient   Following a Speech/Presentation 4   Understanding Ordinary Conversation 4   Taking Medications 3   Remembering Where Things Are Placed or Put Away 3   Remembering List of 4-5 Errands 3   Taking Care of Complicated Tasks 3   Applied Cognition Raw Score 20   Applied Cognition Standardized Score 41.76   Bennie Speak, OT

## 2023-08-09 NOTE — PLAN OF CARE
Problem: Potential for Falls  Goal: Patient will remain free of falls  Description: INTERVENTIONS:  - Educate patient/family on patient safety including physical limitations  - Instruct patient to call for assistance with activity   - Consult OT/PT to assist with strengthening/mobility   - Keep Call bell within reach  - Keep bed low and locked with side rails adjusted as appropriate  - Keep care items and personal belongings within reach  - Initiate and maintain comfort rounds  - Make Fall Risk Sign visible to staff  - Apply yellow socks and bracelet for high fall risk patients  - Consider moving patient to room near nurses station  Outcome: Progressing     Problem: MOBILITY - ADULT  Goal: Maintain or return to baseline ADL function  Description: INTERVENTIONS:  -  Assess patient's ability to carry out ADLs; assess patient's baseline for ADL function and identify physical deficits which impact ability to perform ADLs (bathing, care of mouth/teeth, toileting, grooming, dressing, etc.)  - Assess/evaluate cause of self-care deficits   - Assess range of motion  - Assess patient's mobility; develop plan if impaired  - Assess patient's need for assistive devices and provide as appropriate  - Encourage maximum independence but intervene and supervise when necessary  - Involve family in performance of ADLs  - Assess for home care needs following discharge   - Consider OT consult to assist with ADL evaluation and planning for discharge  - Provide patient education as appropriate  Outcome: Progressing  Goal: Maintains/Returns to pre admission functional level  Description: INTERVENTIONS:  - Perform BMAT or MOVE assessment daily.   - Set and communicate daily mobility goal to care team and patient/family/caregiver. - Collaborate with rehabilitation services on mobility goals if consulted  - Perform Range of Motion 3 times a day. - Reposition patient every 2 hours.   - Dangle patient 3 times a day  - Stand patient 3 times a day  - Ambulate patient 3 times a day  - Out of bed to chair 3 times a day   - Out of bed for meals 3 times a day  - Out of bed for toileting  - Record patient progress and toleration of activity level   Outcome: Progressing     Problem: PAIN - ADULT  Goal: Verbalizes/displays adequate comfort level or baseline comfort level  Description: Interventions:  - Encourage patient to monitor pain and request assistance  - Assess pain using appropriate pain scale  - Administer analgesics based on type and severity of pain and evaluate response  - Implement non-pharmacological measures as appropriate and evaluate response  - Consider cultural and social influences on pain and pain management  - Notify physician/advanced practitioner if interventions unsuccessful or patient reports new pain  Outcome: Progressing     Problem: INFECTION - ADULT  Goal: Absence or prevention of progression during hospitalization  Description: INTERVENTIONS:  - Assess and monitor for signs and symptoms of infection  - Monitor lab/diagnostic results  - Monitor all insertion sites, i.e. indwelling lines, tubes, and drains  - Monitor endotracheal if appropriate and nasal secretions for changes in amount and color  - [facility] [place] appropriate cooling/warming therapies per order  - Administer medications as ordered  - Instruct and encourage patient and family to use good hand hygiene technique  - Identify and instruct in appropriate isolation precautions for identified infection/condition  Outcome: Progressing  Goal: Absence of fever/infection during neutropenic period  Description: INTERVENTIONS:  - Monitor WBC    Outcome: Progressing     Problem: SAFETY ADULT  Goal: Patient will remain free of falls  Description: INTERVENTIONS:  - Educate patient/family on patient safety including physical limitations  - Instruct patient to call for assistance with activity   - Consult OT/PT to assist with strengthening/mobility   - Keep Call bell within reach  - Keep bed low and locked with side rails adjusted as appropriate  - Keep care items and personal belongings within reach  - Initiate and maintain comfort rounds  - Make Fall Risk Sign visible to staff  - Apply yellow socks and bracelet for high fall risk patients  - Consider moving patient to room near nurses station  Outcome: Progressing  Goal: Maintain or return to baseline ADL function  Description: INTERVENTIONS:  -  Assess patient's ability to carry out ADLs; assess patient's baseline for ADL function and identify physical deficits which impact ability to perform ADLs (bathing, care of mouth/teeth, toileting, grooming, dressing, etc.)  - Assess/evaluate cause of self-care deficits   - Assess range of motion  - Assess patient's mobility; develop plan if impaired  - Assess patient's need for assistive devices and provide as appropriate  - Encourage maximum independence but intervene and supervise when necessary  - Involve family in performance of ADLs  - Assess for home care needs following discharge   - Consider OT consult to assist with ADL evaluation and planning for discharge  - Provide patient education as appropriate  Outcome: Progressing  Goal: Maintains/Returns to pre admission functional level  Description: INTERVENTIONS:  - Perform BMAT or MOVE assessment daily.   - Set and communicate daily mobility goal to care team and patient/family/caregiver. - Collaborate with rehabilitation services on mobility goals if consulted  - Perform Range of Motion 3 times a day. - Reposition patient every 2 hours.   - Dangle patient 3 times a day  - Stand patient 3 times a day  - Ambulate patient 3 times a day  - Out of bed to chair 3 times a day   - Out of bed for meals 3 times a day  - Out of bed for toileting  - Record patient progress and toleration of activity level   Outcome: Progressing     Problem: DISCHARGE PLANNING  Goal: Discharge to home or other facility with appropriate resources  Description: INTERVENTIONS:  - Identify barriers to discharge w/patient and caregiver  - Arrange for needed discharge resources and transportation as appropriate  - Identify discharge learning needs (meds, wound care, etc.)  - Arrange for interpretive services to assist at discharge as needed  - Refer to Case Management Department for coordinating discharge planning if the patient needs post-hospital services based on physician/advanced practitioner order or complex needs related to functional status, cognitive ability, or social support system  Outcome: Progressing     Problem: Knowledge Deficit  Goal: Patient/family/caregiver demonstrates understanding of disease process, treatment plan, medications, and discharge instructions  Description: Complete learning assessment and assess knowledge base.   Interventions:  - Provide teaching at level of understanding  - Provide teaching via preferred learning methods  Outcome: Progressing     Problem: Prexisting or High Potential for Compromised Skin Integrity  Goal: Skin integrity is maintained or improved  Description: INTERVENTIONS:  - Identify patients at risk for skin breakdown  - Assess and monitor skin integrity  - Assess and monitor nutrition and hydration status  - Monitor labs   - Assess for incontinence   - Turn and reposition patient  - Assist with mobility/ambulation  - Relieve pressure over bony prominences  - Avoid friction and shearing  - Provide appropriate hygiene as needed including keeping skin clean and dry  - Evaluate need for skin moisturizer/barrier cream  - Collaborate with interdisciplinary team   - Patient/family teaching  - Consider wound care consult   Outcome: Progressing

## 2023-08-09 NOTE — H&P
[address] [place] [address]  H&P  Name: Alvaro Carey 57 y.o. female I MRN: [id number]  Unit/Bed#: [facility] 2 [address] [facility] 222-02 I Date of Admission: 8/8/2023   Date of Service: 8/8/2023 I Hospital Day: 0      Assessment/Plan   * Pulmonary embolism [facility])  Assessment & Plan  Patient has acute pulmonary embolism and they reported lower extremity DVT    Started on IV heparin drip    Patient also has a significant ulcer in the right plantar surface of her foot that likely needs surgical debridement  So hold off on adding oral anticoagulants till podiatry decides what options she has    Diabetic foot ulcer ([address])  Assessment & Plan  Lab Results   Component Value Date    HGBA1C 10.2 (A) 07/21/2023       No results for input(s): "POCGLU" in the last 72 hours. Blood Sugar Average: Last 72 hrs:     Patient is severely large right plantar surface diabetic foot ulcer almost the entire length of her foot  I do not see any probable bone though it is not open. Will have podiatry see. Will need some sort of debridement    PMR (polymyalgia rheumatica) ([facility])  Assessment & Plan  On chronic prednisone    Uncontrolled type 2 diabetes mellitus with hyperglycemia ([facility])  Assessment & Plan  Lab Results   Component Value Date    HGBA1C 10.2 (A) 07/21/2023       No results for input(s): "POCGLU" in the last 72 hours. Blood Sugar Average: Last 72 hrs:       Restart her home Lantus and 3 times daily Humalog. Add Humalog insulin sliding scale    Titrate based on blood sugars              Chief Complaint:   Right foot wound and shortness of breath      History of Present Illness:    Alvaro Carey is a 57 y.o. female who presents with right foot wound and shortness of breath. Patient has had some recent trauma to her right foot and right lower extremity. 1 time she got it caught getting off a bus with her wheelchair.   She has been to [facility].  They did see her for her foot wound and says she should follow-up as outpatient with wound care. But she feels it is getting worse. And she is getting a little more short of breath at rest.  But not needing oxygen. In the ER she is found to have significant diabetic foot ulcer on the plantar surface of her right foot. It is very extensive. Also CT chest showed an acute pulmonary emboli. Bilateral lower extremity ultrasound was done. It is reported she has a right lower extremity ultrasound verbally. But I do not see an official report yet. No history of blood clots. But again she did have some trauma. She is also quite sedentary as she is essentially wheelchair-bound. Review of Systems:    Review of Systems   Constitutional: Negative for chills and fever. HENT: Negative for ear pain and sore throat. Eyes: Negative for pain and visual disturbance. Respiratory: Negative for cough and shortness of breath. Cardiovascular: Negative for chest pain and palpitations. Gastrointestinal: Negative for abdominal pain and vomiting. Genitourinary: Negative for dysuria and hematuria. Musculoskeletal: Negative for arthralgias and back pain. Skin: Negative for color change and rash. Neurological: Negative for seizures and syncope. All other systems reviewed and are negative. Past Medical and Surgical History:     Past Medical History:   Diagnosis Date   • Anemia    • Arthritis    • Benign hypertension     Procedure/Onset: 01/01/2005; Description: BP elevated today. Pt reports running out of BP meds 2wks ago. Will resume BP meds.    • Diabetes mellitus ([address])    • Diabetes mellitus type 2 with complications, uncontrolled 9/8/2015   • Dyspnea on exertion    • Hyperlipidemia    • Hypertension    • Legally blind 2010   • Mild intermittent asthma with exacerbation 8/4/2018   • Miscarriage     x 3   • Polymyalgia rheumatica ([address]) 11/24/2021   • Seizures ([address])    • Sickle cell trait ([address])    • Sleep apnea    • Stage 3a chronic kidney disease ([address]) 5/19/2022   • Thyroid nodule 3/6/2020       Past Surgical History:   Procedure Laterality Date   • CATARACT EXTRACTION Bilateral     august and september   • EYE SURGERY     • HYSTERECTOMY      37   • OOPHORECTOMY Left     37   • MO LIGATION/BIOPSY TEMPORAL ARTERY Bilateral 10/17/2019    Procedure: BIOPSY ARTERY TEMPORAL;  Surgeon: Gia Valerio DO;  Location: [facility];  Service: Vascular   • US GUIDED THYROID BIOPSY  5/13/2020         Home Medications:    Prior to Admission medications    Medication Sig Start Date End Date Taking?  Authorizing Provider   amLODIPine (NORVASC) 5 mg tablet Take 1 tablet (5 mg total) by mouth daily 7/21/23  Yes Angela Matamoros MD   aspirin (ECOTRIN LOW STRENGTH) 81 mg EC tablet Take 1 tablet (81 mg total) by mouth daily 5/30/23  Yes Giorgi Whiting MD   atorvastatin (LIPITOR) 40 mg tablet Take 1 tablet (40 mg total) by mouth daily 7/21/23  Yes Angela Matamoros MD   Blood Glucose Monitoring Suppl (OneTouch Verio Reflect) w/Device KIT Use 1 each 4 (four) times a day 5/22/23  Yes Theophilus Nissen, CRNP   Blood Pressure Monitor KIT Check blood pressures BID 2/17/23  Yes Ara Wan PA-C   budesonide-formoterol (Symbicort) 160-4.5 mcg/act inhaler Inhale 1 puff 2 (two) times a day Rinse mouth after use. 7/21/23  Yes Angela Matamoros MD   cholecalciferol (VITAMIN D3) 1,000 units tablet Take 2 tablets (2,000 Units total) by mouth daily 5/22/23  Yes Theophilus Nissen, CRNP   Continuous Blood Gluc  (FreeStyle [place] 2 Justice) ERIC Use 1 each 4 (four) times a day 2/17/23  Yes Ara Wan PA-C   Continuous Blood Gluc Sensor (FreeStyle [place]isa 2 Sensor) MISC Use 1 each every 14 (fourteen) days 2/17/23  Yes Ara Wan PA-C   furosemide (LASIX) 20 mg tablet Take 1 tablet (20 mg total) by mouth 2 (two) times a day 7/21/23  Yes Angela Matamoros MD   gabapentin (NEURONTIN) 100 mg capsule Take 1 capsule (100 mg total) by mouth 2 (two) times a day 7/21/23  Yes Angela Matamoros MD glucose blood (OneTouch Verio) test strip Check blood sugars four times daily. Please substitute with appropriate alternative as covered by patient's insurance. Dx: E11.65 5/22/23  Yes CONCETTA Mazariegos   hydrALAZINE (APRESOLINE) 10 mg tablet Take 1 tablet (10 mg total) by mouth 3 (three) times a day 7/21/23  Yes Magdalena Wu MD   insulin glargine (LANTUS) 100 units/mL subcutaneous injection Inject 25 Units under the skin every 12 (twelve) hours 7/21/23 11/18/23 Yes Magdalena Wu MD   insulin lispro (HumaLOG) 100 units/mL injection Inject 5 Units under the skin 3 (three) times a day with meals 7/21/23 8/20/23 Yes Magdalena Wu MD   Insulin Pen Needle (BD Pen Needle Tita 2nd Gen) 32G X 4 MM MISC For use with insulin pen. Pharmacy may dispense brand covered by insurance.  5/30/23  Yes Deysi Pringle MD   Insulin Pen Needle (BD Pen Needle Tita U/F) 32G X 4 MM MISC Use four times daily as directed with insulin pen 5/22/23  Yes CONCETTA Mazariegos   Lancets (OneTouch Delica Plus [id number]) MISC Use 1 each 4 (four) times a day 5/22/23  Yes CONCETTA Mazariegos   loperamide (IMODIUM) 2 mg capsule Take 1 capsule (2 mg total) by mouth 4 (four) times a day as needed for diarrhea (can have up to 8mg per day) 3/8/23  Yes Yandel Aguilar DO   losartan (COZAAR) 100 MG tablet Take 1 tablet (100 mg total) by mouth daily 7/21/23  Yes Magdalena Wu MD   metFORMIN (GLUCOPHAGE) 1000 MG tablet Take 1 tablet (1,000 mg total) by mouth 2 (two) times a day with meals 7/21/23  Yes Magdalena Wu MD   metoprolol tartrate (LOPRESSOR) 100 mg tablet Take 1 tablet (100 mg total) by mouth every 12 (twelve) hours 7/21/23  Yes Magdalena Wu MD   multivitamin-iron-minerals-folic acid (THERAPEUTIC-M) TABS tablet Take 1 tablet by mouth daily 7/21/23  Yes Magdalena Wu MD   Nutritional Supplements (ProSource No Carb) LIQD Take 30 mL by mouth 2 (two) times a day   Yes Historical Provider, MD   Petrolatum-Zinc Oxide 57-17 % PSTE Apply 1 application. topically 3 (three) times a day To sacral area - protective   Yes Historical Provider, MD   predniSONE 1 mg tablet Take 4 tablets (4 mg total) by mouth daily 5/30/23  Yes Jaime Krishnamurthy MD   topiramate (TOPAMAX) 100 mg tablet Take 1 tablet (100 mg total) by mouth daily at bedtime 5/30/23  Yes Jaime Krishnamurthy MD   acetaminophen (TYLENOL) 325 mg tablet Take 650 mg by mouth every 6 (six) hours as needed for mild pain    Historical Provider, MD   docusate sodium (COLACE) 100 mg capsule Take 1 capsule (100 mg total) by mouth 2 (two) times a day 4/7/23 5/7/23  CONCETTA Jon   Emollient (eucerin) lotion Apply 1 application. topically 2 (two) times a day = to B/L LE 4/7/23 5/7/23  CONCETTA Jon     I have reviewed home medications with patient personally. Allergies: No Known Allergies      Social History:    Substance Use History:   Social History     Substance and Sexual Activity   Alcohol Use Never   • Alcohol/week: 0.0 standard drinks of alcohol     Social History     Tobacco Use   Smoking Status Never   Smokeless Tobacco Never     Social History     Substance and Sexual Activity   Drug Use No         Family History:    non-contributory      Physical Exam:     Vitals:   Blood Pressure: 129/73 (08/08/23 1939)  Pulse: (!) 109 (08/08/23 1939)  Temperature: 100.3 °F (37.9 °C) (08/08/23 1939)  Temp Source: Oral (08/08/23 1146)  Respirations: 20 (08/08/23 1820)  Weight - Scale: 125 kg (276 lb 3.8 oz) (08/08/23 1216)  SpO2: 98 % (08/08/23 1939)    Physical Exam  Vitals and nursing note reviewed. HENT:      Head: Normocephalic and atraumatic. Eyes:      Pupils: Pupils are equal, round, and reactive to light. Cardiovascular:      Rate and Rhythm: Normal rate and regular rhythm. Heart sounds: No murmur heard. No friction rub. No gallop. Pulmonary:      Effort: Pulmonary effort is normal.      Breath sounds: Normal breath sounds. No wheezing or rales.    Abdominal:      General: Bowel sounds are normal.      Palpations: Abdomen is soft. Tenderness: There is no abdominal tenderness. Musculoskeletal:      Right lower leg: Edema present. Left lower leg: Edema present. Comments: Plantar surface of the right foot has an extensive diabetic foot ulcer the entire length of the foot. There is no actual drainage though. No probable bone. Additional Data:     Lab Results: I have personally reviewed pertinent reports. Results from last 7 days   Lab Units 08/08/23  1302   WBC Thousand/uL 12.57*   HEMOGLOBIN g/dL 10.8*   HEMATOCRIT % 35.7   PLATELETS Thousands/uL 472*   NEUTROS PCT % 72   LYMPHS PCT % 17   MONOS PCT % 6   EOS PCT % 2     Results from last 7 days   Lab Units 08/08/23  1302   POTASSIUM mmol/L 3.5   CHLORIDE mmol/L 101   CO2 mmol/L 27   BUN mg/dL 19   CREATININE mg/dL 0.85   CALCIUM mg/dL 9.3   ALK PHOS U/L 132*   ALT U/L 7   AST U/L 10*     Results from last 7 days   Lab Units 08/08/23  1302   INR  1.00                 Imaging: I have personally reviewed pertinent reports. CTA dissection protocol chest abdomen pelvis w wo contrast   Final Result by Nakul Maguire MD (08/08 0303)      No thoracic aortic dissection      Bilateral pulmonary embolism with pulmonary emboli in the right main pulmonary artery, right lower lobe segmental subsegmental vessels and left lower lobe pulmonary artery. Consider follow-up CTA at 3 months to demonstrate resolution   RV LV ratio is less than 0.9      Ventral abdominal wall hernia      Rounded nodular density seen in the outer left breast measuring 1.3 cm probably due to intramammary lymph nodes, correlate with mammography      I personally discussed this study with Astrid Benson on 8/8/2023 4:05 PM.               Workstation performed: [id number]         XR chest 1 view portable   Final Result by Liliana Manzo MD (08/08 3126)      No acute cardiopulmonary disease.                   Workstation performed: [id number]         VAS lower limb venous duplex study, unilateral/limited    (Results Pending)   VAS lower limb venous duplex study, unilateral/limited    (Results Pending)         ·       VTE Prophylaxis: Heparin Drip        Anticipated Length of Stay:  Patient will be admitted on an Inpatient basis with an anticipated length of stay of greater than 2 midnights. Justification for Hospital Stay: Has acute PE and needs IV heparin. She will need evaluation for podiatry foot ulcer may need debridement. ** Please Note: This note has been constructed using a voice recognition system.  **

## 2023-08-09 NOTE — PROGRESS NOTES
[address] [facility]  Progress Note  Name: Rachel Adam  MRN: [id number]  Unit/Bed#: [facility] 2 [address] [facility] [place] 222-02 I Date of Admission: 8/8/2023   Date of Service: 8/9/2023 I Hospital Day: 1    Assessment/Plan   * Pulmonary embolism [facility])  Assessment & Plan  · Patient has acute pulmonary embolism and they reported lower extremity DVT  · Outpatient venous duplex showed acute non occlusive DVT in the popliteal vein and in the posterior tibial veins  · ECHO ordered   · Continue heparin drip  · Due to right foot plantar ulcer hold off on oral anticoagulants pending podiatry evaluation    Diabetic foot ulcer [facility])  Assessment & Plan  Lab Results   Component Value Date    HGBA1C 10.2 (A) 07/21/2023       Recent Labs     08/08/23  1148 08/08/23  2209 08/09/23  0743 08/09/23  1114   POCGLU 286* 167* 94 107       Blood Sugar Average: Last 72 hrs:  (P) 163.5   · large right plantar surface diabetic foot ulcer almost the entire length of her foot no exposed bone noted   · Continue on ancef   · Follow blood cultures and wound cultures   · X rays bilateral feet with no evidence of osteomyelitis   · Podiatry consulted    PMR (polymyalgia rheumatica) ([facility])  Assessment & Plan  · On chronic prednisone    Uncontrolled type 2 diabetes mellitus with hyperglycemia [facility])  Assessment & Plan  Lab Results   Component Value Date    HGBA1C 10.2 (A) 07/21/2023       Recent Labs     08/08/23  1148 08/08/23  2209 08/09/23  0743 08/09/23  1114   POCGLU 286* 167* 94 107       Blood Sugar Average: Last 72 hrs:  (P) 163.5     · Continue Lantus 25 units and 3 times daily Humalog. Add Humalog insulin sliding scale    QT prolongation  Assessment & Plan  · Prolonged Qtc noted at 530 this AM  · Hypokalemia and hypomagnesia noted   · Replete and repeat EKG in AM           VTE Pharmacologic Prophylaxis: VTE Score: 3 Moderate Risk (Score 3-4) - Pharmacological DVT Prophylaxis Ordered: heparin drip.     Patient Centered Rounds: I performed bedside rounds with nursing staff today. Discussions with Specialists or Other Care Team Provider: CM    Education and Discussions with Family / Patient: Updated  (son) at bedside. Total Time Spent on Date of Encounter in care of patient: 45 minutes This time was spent on one or more of the following: performing physical exam; counseling and coordination of care; obtaining or reviewing history; documenting in the medical record; reviewing/ordering tests, medications or procedures; communicating with other healthcare professionals and discussing with patient's family/caregivers. Current Length of Stay: 1 day(s)  Current Patient Status: Inpatient   Certification Statement: The patient will continue to require additional inpatient hospital stay due to pe and foot ulcer requiring workup and intervention  Discharge Plan: Anticipate discharge in 48 hrs to discharge location to be determined pending rehab evaluations. Code Status: Level 1 - Full Code    Subjective:   Patient was seen sitting in chair at bedside today. She reports feeling tired. She reports some occasional chest pain. She states she has had poor po intake. She denies any sob, abdominal pain, nausea, vomiting. Objective:     Vitals:   Temp (24hrs), Av.5 °F (37.5 °C), Min:98 °F (36.7 °C), Max:101.1 °F (38.4 °C)    Temp:  [98 °F (36.7 °C)-101.1 °F (38.4 °C)] 98.4 °F (36.9 °C)  HR:  [] 65  Resp:  [16-32] 16  BP: (110-176)/(58-82) 110/58  SpO2:  [95 %-100 %] 95 %  Body mass index is 41.97 kg/m². Input and Output Summary (last 24 hours): Intake/Output Summary (Last 24 hours) at 2023 1256  Last data filed at 2023 2200  Gross per 24 hour   Intake 1044.62 ml   Output 0 ml   Net 1044.62 ml       Physical Exam:   Physical Exam  Vitals and nursing note reviewed. Constitutional:       Appearance: Normal appearance. HENT:      Head: Normocephalic and atraumatic. Eyes:      General: No scleral icterus.   Cardiovascular: Rate and Rhythm: Normal rate and regular rhythm. Pulmonary:      Effort: Pulmonary effort is normal.      Breath sounds: Normal breath sounds. Abdominal:      General: Abdomen is flat. Bowel sounds are normal.   Musculoskeletal:      Right lower leg: Edema present. Left lower leg: Edema present. Skin:     Comments: Dressing in place on right foot    Neurological:      Mental Status: She is alert. Mental status is at baseline.    Psychiatric:         Mood and Affect: Mood normal.         Behavior: Behavior normal.      Additional Data:     Labs:  Results from last 7 days   Lab Units 08/09/23  0628   WBC Thousand/uL 12.85*   HEMOGLOBIN g/dL 8.5*   HEMATOCRIT % 28.8*   PLATELETS Thousands/uL 398*   NEUTROS PCT % 65   LYMPHS PCT % 24   MONOS PCT % 7   EOS PCT % 2     Results from last 7 days   Lab Units 08/09/23  0628 08/08/23  1302   SODIUM mmol/L 139 136   POTASSIUM mmol/L 3.3* 3.5   CHLORIDE mmol/L 107 101   CO2 mmol/L 28 27   BUN mg/dL 14 19   CREATININE mg/dL 0.86 0.85   ANION GAP mmol/L 4 8   CALCIUM mg/dL 8.4 9.3   ALBUMIN g/dL  --  3.4*   TOTAL BILIRUBIN mg/dL  --  0.79   ALK PHOS U/L  --  132*   ALT U/L  --  7   AST U/L  --  10*   GLUCOSE RANDOM mg/dL 84 260*     Results from last 7 days   Lab Units 08/08/23  2251   INR  1.13     Results from last 7 days   Lab Units 08/09/23  1114 08/09/23  0743 08/08/23  2209 08/08/23  1148   POC GLUCOSE mg/dl 107 94 167* 286*         Results from last 7 days   Lab Units 08/08/23  1533   LACTIC ACID mmol/L 1.8       Lines/Drains:  Invasive Devices     Peripheral Intravenous Line  Duration           Peripheral IV 08/08/23 Right Antecubital 1 day          Drain  Duration           External Urinary Catheter <1 day                  Telemetry:  Telemetry Orders (From admission, onward)             24 Hour Telemetry Monitoring  Continuous x 24 Hours (Telem)        Expiring   Question:  Reason for 24 Hour Telemetry  Answer:  Pulmonary Embolism                 Telemetry Reviewed: Normal Sinus Rhythm qtc prolongation   Indication for Continued Telemetry Use: Metabolic/electrolyte disturbance with high probability of dysrhythmia             Imaging: Reviewed radiology reports from this admission including: xray(s)    Recent Cultures (last 7 days):   Results from last 7 days   Lab Units 08/08/23  2251   BLOOD CULTURE  Received in Microbiology Lab. Culture in Progress. Received in Microbiology Lab. Culture in Progress.        Last 24 Hours Medication List:   Current Facility-Administered Medications   Medication Dose Route Frequency Provider Last Rate   • acetaminophen  650 mg Oral Q6H PRN Bentley Blackburn Prechtel, DO     • amLODIPine  5 mg Oral Daily Mic S Prechtel, DO     • aspirin  81 mg Oral Daily Mic S Prechtel, DO     • atorvastatin  40 mg Oral Daily [employer] S Prechtel, DO     • budesonide-formoterol  1 puff Inhalation BID [facility] Alexey Prechtel, DO     • cefazolin  2,000 mg Intravenous Q8H Roberta Turcios PA-C 2,000 mg (08/09/23 2405)   • cholecalciferol  2,000 Units Oral Daily Mic S Prechtel, DO     • docusate sodium  100 mg Oral BID Mic S Prechtel, DO     • furosemide  20 mg Oral BID Mic S Prechtel, DO     • gabapentin  100 mg Oral BID [facility] Prechtel, DO     • heparin (porcine)  3-30 Units/kg/hr (Order-Specific) Intravenous Titrated Valerie Plan, DO Stopped (08/09/23 6536)   • heparin (porcine)  10,000 Units Intravenous Q6H PRN Valerie Plan, DO     • heparin (porcine)  5,000 Units Intravenous Q6H PRN Valerie Plan, DO     • hydrALAZINE  10 mg Oral TID Bentley Blackburn Precel, DO     • insulin glargine  25 Units Subcutaneous Q12H [address] Mic S Prechtel, DO     • insulin lispro  1-5 Units Subcutaneous HS Mic S Prechtel, DO     • insulin lispro  1-6 Units Subcutaneous TID AC Mic S Prechtel, DO     • insulin lispro  5 Units Subcutaneous TID With Meals Mic S Prechtel, DO     • loperamide  2 mg Oral 4x Daily PRN Bentley Blackburn Prechtel, DO     • losartan  100 mg Oral Daily Jean-Paul Shoreham Prechtel, DO     • menthol-methyl salicylate   Apply externally 4x Daily PRN Reji Moses PA-C     • metoprolol tartrate  100 mg Oral Q12H [address] Mic S Prechtel, DO     • ondansetron  4 mg Intravenous Q6H PRN Jefferson [place]ham Prechtel, DO     • predniSONE  4 mg Oral Daily Mic S Prechtel, DO     • topiramate  100 mg Oral HS Chinyere Aguiar DO          Today, Patient Was Seen By: Good Garrison PA-C    **Please Note: This note may have been constructed using a voice recognition system. **

## 2023-08-09 NOTE — ASSESSMENT & PLAN NOTE
· Prolonged Qtc noted at 530 this AM  · Hypokalemia and hypomagnesia noted   · Replete and repeat EKG in AM

## 2023-08-10 LAB
ANION GAP SERPL CALCULATED.3IONS-SCNC: 6 MMOL/L
APTT PPP: 94 SECONDS (ref 23–37)
ATRIAL RATE: 0 BPM
ATRIAL RATE: 66 BPM
BASOPHILS # BLD AUTO: 0.07 THOUSANDS/ÂΜL (ref 0–0.1)
BASOPHILS NFR BLD AUTO: 1 % (ref 0–1)
BUN SERPL-MCNC: 14 MG/DL (ref 5–25)
CALCIUM SERPL-MCNC: 8.2 MG/DL (ref 8.4–10.2)
CHLORIDE SERPL-SCNC: 107 MMOL/L (ref 96–108)
CO2 SERPL-SCNC: 26 MMOL/L (ref 21–32)
CREAT SERPL-MCNC: 0.83 MG/DL (ref 0.6–1.3)
EOSINOPHIL # BLD AUTO: 0.32 THOUSAND/ÂΜL (ref 0–0.61)
EOSINOPHIL NFR BLD AUTO: 3 % (ref 0–6)
ERYTHROCYTE [DISTWIDTH] IN BLOOD BY AUTOMATED COUNT: 15.8 % (ref 11.6–15.1)
GFR SERPL CREATININE-BSD FRML MDRD: 74 ML/MIN/1.73SQ M
GLUCOSE SERPL-MCNC: 182 MG/DL (ref 65–140)
GLUCOSE SERPL-MCNC: 210 MG/DL (ref 65–140)
GLUCOSE SERPL-MCNC: 246 MG/DL (ref 65–140)
GLUCOSE SERPL-MCNC: 289 MG/DL (ref 65–140)
GLUCOSE SERPL-MCNC: 291 MG/DL (ref 65–140)
HCT VFR BLD AUTO: 28.4 % (ref 34.8–46.1)
HGB BLD-MCNC: 8.6 G/DL (ref 11.5–15.4)
IMM GRANULOCYTES # BLD AUTO: 0.1 THOUSAND/UL (ref 0–0.2)
IMM GRANULOCYTES NFR BLD AUTO: 1 % (ref 0–2)
LYMPHOCYTES # BLD AUTO: 2.94 THOUSANDS/ÂΜL (ref 0.6–4.47)
LYMPHOCYTES NFR BLD AUTO: 25 % (ref 14–44)
MAGNESIUM SERPL-MCNC: 2 MG/DL (ref 1.9–2.7)
MCH RBC QN AUTO: 24.2 PG (ref 26.8–34.3)
MCHC RBC AUTO-ENTMCNC: 30.3 G/DL (ref 31.4–37.4)
MCV RBC AUTO: 80 FL (ref 82–98)
MONOCYTES # BLD AUTO: 0.87 THOUSAND/ÂΜL (ref 0.17–1.22)
MONOCYTES NFR BLD AUTO: 8 % (ref 4–12)
NEUTROPHILS # BLD AUTO: 7.32 THOUSANDS/ÂΜL (ref 1.85–7.62)
NEUTS SEG NFR BLD AUTO: 62 % (ref 43–75)
NRBC BLD AUTO-RTO: 0 /100 WBCS
P AXIS: 57 DEGREES
PLATELET # BLD AUTO: 413 THOUSANDS/UL (ref 149–390)
PMV BLD AUTO: 11 FL (ref 8.9–12.7)
POTASSIUM SERPL-SCNC: 3.6 MMOL/L (ref 3.5–5.3)
PR INTERVAL: 182 MS
QRS AXIS: 0 DEGREES
QRS AXIS: 16 DEGREES
QRSD INTERVAL: 0 MS
QRSD INTERVAL: 104 MS
QT INTERVAL: 0 MS
QT INTERVAL: 468 MS
QTC INTERVAL: 0 MS
QTC INTERVAL: 490 MS
RBC # BLD AUTO: 3.56 MILLION/UL (ref 3.81–5.12)
SODIUM SERPL-SCNC: 139 MMOL/L (ref 135–147)
T WAVE AXIS: 0 DEGREES
T WAVE AXIS: 46 DEGREES
VENTRICULAR RATE: 0 BPM
VENTRICULAR RATE: 66 BPM
WBC # BLD AUTO: 11.62 THOUSAND/UL (ref 4.31–10.16)

## 2023-08-10 PROCEDURE — 85730 THROMBOPLASTIN TIME PARTIAL: CPT | Performed by: INTERNAL MEDICINE

## 2023-08-10 PROCEDURE — 82948 REAGENT STRIP/BLOOD GLUCOSE: CPT

## 2023-08-10 PROCEDURE — 93005 ELECTROCARDIOGRAM TRACING: CPT

## 2023-08-10 PROCEDURE — 93010 ELECTROCARDIOGRAM REPORT: CPT | Performed by: INTERNAL MEDICINE

## 2023-08-10 PROCEDURE — 80048 BASIC METABOLIC PNL TOTAL CA: CPT

## 2023-08-10 PROCEDURE — 83735 ASSAY OF MAGNESIUM: CPT

## 2023-08-10 PROCEDURE — 99232 SBSQ HOSP IP/OBS MODERATE 35: CPT

## 2023-08-10 PROCEDURE — 85025 COMPLETE CBC W/AUTO DIFF WBC: CPT

## 2023-08-10 RX ORDER — POTASSIUM CHLORIDE 20 MEQ/1
20 TABLET, EXTENDED RELEASE ORAL ONCE
Status: COMPLETED | OUTPATIENT
Start: 2023-08-10 | End: 2023-08-10

## 2023-08-10 RX ORDER — POTASSIUM CHLORIDE 20 MEQ/1
20 TABLET, EXTENDED RELEASE ORAL ONCE
Status: DISCONTINUED | OUTPATIENT
Start: 2023-08-10 | End: 2023-08-10

## 2023-08-10 RX ADMIN — ACETAMINOPHEN 325MG 650 MG: 325 TABLET ORAL at 07:59

## 2023-08-10 RX ADMIN — METOPROLOL TARTRATE 100 MG: 100 TABLET, FILM COATED ORAL at 09:12

## 2023-08-10 RX ADMIN — Medication 2000 UNITS: at 09:11

## 2023-08-10 RX ADMIN — GABAPENTIN 100 MG: 100 CAPSULE ORAL at 17:23

## 2023-08-10 RX ADMIN — INSULIN LISPRO 4 UNITS: 100 INJECTION, SOLUTION INTRAVENOUS; SUBCUTANEOUS at 17:22

## 2023-08-10 RX ADMIN — HYDRALAZINE HYDROCHLORIDE 10 MG: 10 TABLET, FILM COATED ORAL at 17:24

## 2023-08-10 RX ADMIN — INSULIN GLARGINE 25 UNITS: 100 INJECTION, SOLUTION SUBCUTANEOUS at 09:12

## 2023-08-10 RX ADMIN — APIXABAN 10 MG: 5 TABLET, FILM COATED ORAL at 10:22

## 2023-08-10 RX ADMIN — BUDESONIDE AND FORMOTEROL FUMARATE DIHYDRATE 1 PUFF: 160; 4.5 AEROSOL RESPIRATORY (INHALATION) at 18:37

## 2023-08-10 RX ADMIN — BUDESONIDE AND FORMOTEROL FUMARATE DIHYDRATE 1 PUFF: 160; 4.5 AEROSOL RESPIRATORY (INHALATION) at 09:14

## 2023-08-10 RX ADMIN — FUROSEMIDE 20 MG: 20 TABLET ORAL at 17:24

## 2023-08-10 RX ADMIN — CEFAZOLIN SODIUM 2000 MG: 2 SOLUTION INTRAVENOUS at 06:20

## 2023-08-10 RX ADMIN — CEFAZOLIN SODIUM 2000 MG: 2 SOLUTION INTRAVENOUS at 22:06

## 2023-08-10 RX ADMIN — TOPIRAMATE 100 MG: 25 TABLET, FILM COATED ORAL at 21:30

## 2023-08-10 RX ADMIN — INSULIN LISPRO 5 UNITS: 100 INJECTION, SOLUTION INTRAVENOUS; SUBCUTANEOUS at 17:23

## 2023-08-10 RX ADMIN — HYDRALAZINE HYDROCHLORIDE 10 MG: 10 TABLET, FILM COATED ORAL at 21:29

## 2023-08-10 RX ADMIN — ASPIRIN 81 MG: 81 TABLET, COATED ORAL at 09:11

## 2023-08-10 RX ADMIN — INSULIN LISPRO 1 UNITS: 100 INJECTION, SOLUTION INTRAVENOUS; SUBCUTANEOUS at 12:31

## 2023-08-10 RX ADMIN — ATORVASTATIN CALCIUM 40 MG: 40 TABLET, FILM COATED ORAL at 17:23

## 2023-08-10 RX ADMIN — INSULIN GLARGINE 25 UNITS: 100 INJECTION, SOLUTION SUBCUTANEOUS at 21:30

## 2023-08-10 RX ADMIN — LOSARTAN POTASSIUM 100 MG: 50 TABLET, FILM COATED ORAL at 09:12

## 2023-08-10 RX ADMIN — DOCUSATE SODIUM 100 MG: 100 CAPSULE, LIQUID FILLED ORAL at 17:23

## 2023-08-10 RX ADMIN — GABAPENTIN 100 MG: 100 CAPSULE ORAL at 09:12

## 2023-08-10 RX ADMIN — HYDRALAZINE HYDROCHLORIDE 10 MG: 10 TABLET, FILM COATED ORAL at 09:12

## 2023-08-10 RX ADMIN — FUROSEMIDE 20 MG: 20 TABLET ORAL at 09:11

## 2023-08-10 RX ADMIN — PREDNISONE 4 MG: 1 TABLET ORAL at 09:12

## 2023-08-10 RX ADMIN — INSULIN LISPRO 2 UNITS: 100 INJECTION, SOLUTION INTRAVENOUS; SUBCUTANEOUS at 07:58

## 2023-08-10 RX ADMIN — CEFAZOLIN SODIUM 2000 MG: 2 SOLUTION INTRAVENOUS at 15:30

## 2023-08-10 RX ADMIN — POTASSIUM CHLORIDE 20 MEQ: 1500 TABLET, EXTENDED RELEASE ORAL at 07:59

## 2023-08-10 RX ADMIN — DOCUSATE SODIUM 100 MG: 100 CAPSULE, LIQUID FILLED ORAL at 09:12

## 2023-08-10 RX ADMIN — INSULIN LISPRO 5 UNITS: 100 INJECTION, SOLUTION INTRAVENOUS; SUBCUTANEOUS at 08:00

## 2023-08-10 RX ADMIN — APIXABAN 10 MG: 5 TABLET, FILM COATED ORAL at 17:23

## 2023-08-10 RX ADMIN — INSULIN LISPRO 5 UNITS: 100 INJECTION, SOLUTION INTRAVENOUS; SUBCUTANEOUS at 12:31

## 2023-08-10 RX ADMIN — METOPROLOL TARTRATE 100 MG: 100 TABLET, FILM COATED ORAL at 21:30

## 2023-08-10 RX ADMIN — INSULIN LISPRO 3 UNITS: 100 INJECTION, SOLUTION INTRAVENOUS; SUBCUTANEOUS at 21:30

## 2023-08-10 RX ADMIN — AMLODIPINE BESYLATE 5 MG: 5 TABLET ORAL at 09:12

## 2023-08-10 NOTE — ASSESSMENT & PLAN NOTE
Lab Results   Component Value Date    HGBA1C 10.2 (A) 07/21/2023       Recent Labs     08/09/23  1114 08/09/23  1703 08/09/23  2108 08/10/23  0714   POCGLU 107 207* 256* 210*       Blood Sugar Average: Last 72 hrs:  (P) 189.5431808857525828     · Continue Lantus 25 units and 3 times daily Humalog.   Add Humalog insulin sliding scale

## 2023-08-10 NOTE — ASSESSMENT & PLAN NOTE
Lab Results   Component Value Date    HGBA1C 10.2 (A) 07/21/2023       Recent Labs     08/09/23  1114 08/09/23  1703 08/09/23  2108 08/10/23  0714   POCGLU 107 207* 256* 210*       Blood Sugar Average: Last 72 hrs:  (P) 189.3945398595899919   · large right plantar surface diabetic foot ulcer almost the entire length of her foot no exposed bone noted   · Continue on ancef   · Blood cultures negative at 24 hours, follow would cultures results in gram negative rods   · X rays bilateral feet with no evidence of osteomyelitis   · Podiatry consulted no need for surgical intervention

## 2023-08-10 NOTE — ASSESSMENT & PLAN NOTE
· Patient has acute pulmonary embolism and lower extremity DVT  · Outpatient venous duplex showed acute non occlusive DVT in the left popliteal vein and in the posterior tibial veins  · ECHO stable from previous  · Transition off heparin drip to eliquis today

## 2023-08-10 NOTE — CASE MANAGEMENT
Case Management Assessment & Discharge Planning Note    Patient name Annia Barrios  Location [facility]5 [address] 222/[facility] 2 Howard Bass* MRN [id number]  : 1960 Date 8/10/2023       Current Admission Date: 2023  Current Admission Diagnosis:Pulmonary embolism [facility])   Patient Active Problem List    Diagnosis Date Noted   • Pulmonary embolism ([facility]) 2023   • Diabetic foot ulcer ([facility]) 2023   • Ulcer of left heel ([facility]) 2023   • Homeless 2023   • Renal insufficiency 2023   • Encounter for support and coordination of transition of care 2023   • Food insecurity 2023   • Leukocytosis 2023   • Friction blisters of the soles 2023   • Calculus of gallbladder without cholecystitis without obstruction 2023   • Diabetes mellitus ([facility]) 2023   • Abnormal CT scan 2023   • Suspected pressure injury of deep tissue 03/15/2023   • Dyslipidemia 2023   • Insulin long-term use ([facility]) 2023   • Type 2 diabetes mellitus with hyperglycemia ([facility]) 2023   • Ambulatory dysfunction 2023   • Legally blind 2023   • Diarrhea 2023   • Bilateral lower extremity edema 2023   • Unsheltered homelessness 2023   • Abnormal urinalysis 2023   • Weakness 2023   • Intertrigo 2023   • Leg numbness 2023   • Unsteadiness on feet 2022   • Chronic migraine without aura, not intractable, without status migrainosus 2022   • Obstructive sleep apnea 2022   • Diabetic neuropathy ([facility]) 2022   • Stage 3a chronic kidney disease ([facility]) 2022   • Polymyalgia rheumatica ([facility]) 2021   • Gait instability 2021   • Ulnar neuropathy of right upper extremity    • Blurry vision, bilateral 2021   • Depressed mood 10/08/2020   • Type 2 diabetes mellitus with other specified complication ([facility])    • Hypertension 05/10/2020   • Chronic migraine without aura without status migrainosus, not intractable 04/22/2020   • Hypersomnia 04/06/2020   • Cerebral aneurysm without rupture 04/06/2020   • History of absence seizures 03/25/2020   • Thyroid nodule 03/06/2020   • Aneurysm ([address]) 03/05/2020   • Type 2 diabetes mellitus with hyperglycemia, with long-term current use of insulin ([address]) 02/21/2020   • Left cavernous carotid aneurysm 02/10/2020   • Polyneuropathy associated with underlying disease ([address]) 01/07/2020   • PMR (polymyalgia rheumatica) ([address]) 01/07/2020   • Thrombocytosis 01/07/2020   • Morbid obesity ([address]) 09/19/2019   • Other inflammatory and immune myopathies, not elsewhere classified 09/16/2019   • Transaminitis 09/16/2019   • Frequent headaches 07/09/2019   • Type 2 diabetes mellitus with microalbuminuria, with long-term current use of insulin ([address]) 02/01/2019   • Mild intermittent asthma without complication 65/26/2970   • Orthostatic hypotension 06/25/2018   • Lung nodules 03/22/2018   • Exertional dyspnea 02/13/2018   • Enlarged pulmonary artery ([address]) 02/13/2018   • Aortic dilatation ([address]) 02/13/2018   • Uncontrolled type 2 diabetes mellitus with hyperglycemia ([address])    • Seizures ([address])    • Obstructive sleep apnea (adult) (pediatric) 12/18/2017   • QT prolongation 12/18/2017   • Decreased pulses in feet 12/11/2017   • Microalbuminuria 09/08/2015   • Vitamin D deficiency 06/06/2014   • Visual impairment in both eyes 12/06/2012   • Anemia 03/20/2012   • Hyperlipidemia 03/20/2012   • Class 3 severe obesity with serious comorbidity and body mass index (BMI) of 50.0 to 59.9 in adult [facility]) 03/20/2012   • Neuropathy of hand, right 03/20/2012      LOS (days): 2  Geometric Mean LOS (GMLOS) (days): 3.90  Days to GMLOS:1.9     OBJECTIVE:    Risk of Unplanned Readmission Score: 54.51         Current admission status: Inpatient       Preferred Pharmacy:   [address] [address]  Phone: [phone] Fax: [phone] [facility] 101, [address],  [address] [facility],  [address] [address]  Phone: [phone] Fax: [phone]    Primary Care Provider: Deja Martin MD    Primary Insurance: [facility] DUAL COMPLETE [facility] REP  Secondary Insurance: [facility]    ASSESSMENT:  [address] [place], [address] [facility] Representative - Son   Primary Phone: [phone] (Mobile)  Home Phone: [phone]                              Patient Information  Admitted from[de-identified] Other (comment) (homeless)  Mental Status: Alert  During Assessment patient was accompanied by: Son  Assessment information provided by[de-identified] Patient  Primary Caregiver: Self  Support Systems: Son  [place] of Residence: [address] [place] [address] do you live in?: [facility]  Type of Current Residence: Homeless  Living Arrangements: Other (Comment) (homeless, she and son stay in park in [facility])    Activities of Daily Living Prior to Admission  Functional Status: Independent  Completes ADLs independently?: Yes  Ambulates independently?: No  Level of ambulatory dependence: Assistance  Does patient use assisted devices?: Yes  Assisted Devices (DME) used:  Wheelchair  Does patient currently own DME?: Yes  What DME does the patient currently own?: Wheelchair (no longer has the cane or walker)  Does patient have a history of Outpatient Therapy (PT/OT)?: Yes  Does the patient have a history of Short-Term Rehab?: Yes         Patient Information Continued  Income Source: SSI/SSD  Food insecurity resource given?: N/A  Does patient receive dialysis treatments?: No  Does patient have a history of substance abuse?: No  Does patient have a history of Mental Health Diagnosis?: No         Means of Transportation  Means of Transport to [facility][de-identified] [employer] (reported she was approved for [employer] while ago but does not use it)  Was application for public transport provided?: N/A        DISCHARGE DETAILS:    Discharge planning discussed with[de-identified] Patient and son        CM contacted family/caregiver?: Yes             Contacts  Patient Contacts: Janine Huntley  Relationship to Patient[de-identified] Family  Contact Method: In Person  Reason/Outcome: Emergency Contact, Discharge Planning                                                                     Additional Comments: CM met with pt at bedside. Pt reported she and son are homeless, staying in a park in [place]. Confirms going to [facility]; however, was told she doesnt qualify for their housing program d/t not having MH or D+A diagnosis. Confirmed she is on housing waitlists. She is now using [employer] address for mailing ([address] [facility]). Pt reported her SNAP was cut off but she reapplied last Friday. She has been going to [facility] for meals and people residing near the park provide food for her/son. Pt reported she was previously staying at the [employer] bus station; however, they are doing construction on it now so they couldnt stay. Pt reporting her wheelchair pedal is broken. Someone from the [employer] provided her with the wheelchair and got rid of her cane/walker. Pt requesting that wheelchair be ordered through insurance. Pt confirmed she uses [facility] on file. Pt stated she has a new phone, # is [phone]. She is still learning how to use it. She still has the old phone (#[phone]); however, she reported she hasnt been hearing any notifications. CM dept to continue to follow.

## 2023-08-10 NOTE — PROGRESS NOTES
[address] [facility]  Progress Note  Name: Glo Nugent  MRN: [id number]  Unit/Bed#: [facility] 2 [address] [facility] 222-02 I Date of Admission: 8/8/2023   Date of Service: 8/10/2023 I Hospital Day: 2    Assessment/Plan   * Pulmonary embolism [facility])  Assessment & Plan  · Patient has acute pulmonary embolism and lower extremity DVT  · Outpatient venous duplex showed acute non occlusive DVT in the left popliteal vein and in the posterior tibial veins  · ECHO stable from previous  · Transition off heparin drip to eliquis today    Diabetic foot ulcer [facility])  Assessment & Plan  Lab Results   Component Value Date    HGBA1C 10.2 (A) 07/21/2023       Recent Labs     08/09/23  1114 08/09/23  1703 08/09/23  2108 08/10/23  0714   POCGLU 107 207* 256* 210*       Blood Sugar Average: Last 72 hrs:  (P) 189.2265299206900193   · large right plantar surface diabetic foot ulcer almost the entire length of her foot no exposed bone noted   · Continue on ancef   · Blood cultures negative at 24 hours, follow would cultures results in gram negative rods   · X rays bilateral feet with no evidence of osteomyelitis   · Podiatry consulted no need for surgical intervention    PMR (polymyalgia rheumatica) ([facility])  Assessment & Plan  · On chronic prednisone    Uncontrolled type 2 diabetes mellitus with hyperglycemia [facility])  Assessment & Plan  Lab Results   Component Value Date    HGBA1C 10.2 (A) 07/21/2023       Recent Labs     08/09/23  1114 08/09/23  1703 08/09/23  2108 08/10/23  0714   POCGLU 107 207* 256* 210*       Blood Sugar Average: Last 72 hrs:  (P) 189.6196474902496304     · Continue Lantus 25 units and 3 times daily Humalog. Add Humalog insulin sliding scale    QT prolongation  Assessment & Plan  · Prolonged Qtc improved to 490 on EKG this AM  · Continue to replete and monitor on EKG           VTE Pharmacologic Prophylaxis: VTE Score: 3 Moderate Risk (Score 3-4) - Pharmacological DVT Prophylaxis Ordered: apixaban (Eliquis).     Patient Centered Rounds: I performed bedside rounds with nursing staff today. Discussions with Specialists or Other Care Team Provider: MAI    Education and Discussions with Family / Patient: attempted to call son but number not in service. Total Time Spent on Date of Encounter in care of patient: 45 minutes This time was spent on one or more of the following: performing physical exam; counseling and coordination of care; obtaining or reviewing history; documenting in the medical record; reviewing/ordering tests, medications or procedures; communicating with other healthcare professionals and discussing with patient's family/caregivers. Current Length of Stay: 2 day(s)  Current Patient Status: Inpatient   Certification Statement: The patient will continue to require additional inpatient hospital stay due to IV antibiotics for bilateral feet wounds  Discharge Plan: Anticipate discharge in 24-48 hrs to rehab facility. Code Status: Level 1 - Full Code    Subjective:   Patient was seen laying in bed today. She reports some mild back pain she denies any other acute complaints at this time. Objective:     Vitals:   Temp (24hrs), Av.7 °F (37.1 °C), Min:98.2 °F (36.8 °C), Max:99.3 °F (37.4 °C)    Temp:  [98.2 °F (36.8 °C)-99.3 °F (37.4 °C)] 98.6 °F (37 °C)  HR:  [65-75] 68  Resp:  [15-18] 15  BP: (110-152)/(58-74) 150/74  SpO2:  [94 %-99 %] 99 %  Body mass index is 41.97 kg/m². Input and Output Summary (last 24 hours): Intake/Output Summary (Last 24 hours) at 8/10/2023 1035  Last data filed at 8/10/2023 0815  Gross per 24 hour   Intake 360 ml   Output 2850 ml   Net -2490 ml       Physical Exam:   Physical Exam  Vitals and nursing note reviewed. Constitutional:       Appearance: Normal appearance. HENT:      Head: Normocephalic and atraumatic. Eyes:      General: No scleral icterus. Cardiovascular:      Rate and Rhythm: Normal rate and regular rhythm.    Pulmonary:      Effort: Pulmonary effort is normal.      Breath sounds: Normal breath sounds. Abdominal:      General: Bowel sounds are normal.      Palpations: Abdomen is soft. Musculoskeletal:      Right lower leg: Edema present. Left lower leg: Edema present. Skin:     General: Skin is warm and dry. Comments: Bilateral feet in guaze dressing   Neurological:      Mental Status: She is alert. Mental status is at baseline. Psychiatric:         Mood and Affect: Mood normal.         Behavior: Behavior normal.        Additional Data:     Labs:  Results from last 7 days   Lab Units 08/10/23  0512   WBC Thousand/uL 11.62*   HEMOGLOBIN g/dL 8.6*   HEMATOCRIT % 28.4*   PLATELETS Thousands/uL 413*   NEUTROS PCT % 62   LYMPHS PCT % 25   MONOS PCT % 8   EOS PCT % 3     Results from last 7 days   Lab Units 08/10/23  0512 08/09/23  0628 08/08/23  1302   SODIUM mmol/L 139   < > 136   POTASSIUM mmol/L 3.6   < > 3.5   CHLORIDE mmol/L 107   < > 101   CO2 mmol/L 26   < > 27   BUN mg/dL 14   < > 19   CREATININE mg/dL 0.83   < > 0.85   ANION GAP mmol/L 6   < > 8   CALCIUM mg/dL 8.2*   < > 9.3   ALBUMIN g/dL  --   --  3.4*   TOTAL BILIRUBIN mg/dL  --   --  0.79   ALK PHOS U/L  --   --  132*   ALT U/L  --   --  7   AST U/L  --   --  10*   GLUCOSE RANDOM mg/dL 246*   < > 260*    < > = values in this interval not displayed. Results from last 7 days   Lab Units 08/08/23  2251   INR  1.13     Results from last 7 days   Lab Units 08/10/23  0714 08/09/23  2108 08/09/23  1703 08/09/23  1114 08/09/23  0743 08/08/23  2209 08/08/23  1148   POC GLUCOSE mg/dl 210* 256* 207* 107 94 167* 286*         Results from last 7 days   Lab Units 08/08/23  1533   LACTIC ACID mmol/L 1.8       Lines/Drains:  Invasive Devices     Peripheral Intravenous Line  Duration           Peripheral IV 08/08/23 Right Antecubital 1 day          Drain  Duration           External Urinary Catheter 1 day                      Imaging: No pertinent imaging reviewed.     Recent Cultures (last 7 days): Results from last 7 days   Lab Units 08/09/23  0945 08/08/23  2251   BLOOD CULTURE   --  No Growth at 24 hrs. No Growth at 24 hrs.    GRAM STAIN RESULT  3+ Polys*  2+ Gram positive rods*  --        Last 24 Hours Medication List:   Current Facility-Administered Medications   Medication Dose Route Frequency Provider Last Rate   • acetaminophen  650 mg Oral Q6H PRN Beena Gold Prechtel, DO     • amLODIPine  5 mg Oral Daily [facility] Prechtel, DO     • apixaban  10 mg Oral BID Rodney Hahn PA-C     • aspirin  81 mg Oral Daily [place] Prechtel, DO     • atorvastatin  40 mg Oral Daily With [employer] Prechtel, DO     • budesonide-formoterol  1 puff Inhalation BID Beena Rory Prechtel, DO     • cefazolin  2,000 mg Intravenous Q8H Saeid Duval PA-C 2,000 mg (08/10/23 5448)   • cholecalciferol  2,000 Units Oral Daily [facility] Prechtel, DO     • docusate sodium  100 mg Oral BID [facility] Prechtel, DO     • furosemide  20 mg Oral BID [facility] Prechtel, DO     • gabapentin  100 mg Oral BID [facility] Prechtel, DO     • hydrALAZINE  10 mg Oral TID Beena Gold Prechtel, DO     • insulin glargine  25 Units Subcutaneous Q12H [facility], [facility]     • insulin lispro  1-5 Units Subcutaneous HS [facility] Precel, DO     • insulin lispro  1-6 Units Subcutaneous TID AC [facility], [facility]     • insulin lispro  5 Units Subcutaneous TID With Meals [facility] Prechtel, DO     • loperamide  2 mg Oral 4x Daily PRN Beena Rory Prechtel, DO     • losartan  100 mg Oral Daily Beena Gold Prechtel, DO     • menthol-methyl salicylate   Apply externally 4x Daily PRN Saeid Duval PA-C     • metoprolol tartrate  100 mg Oral Q12H [facility]     • ondansetron  4 mg Intravenous Q6H PRN Beena Rory Prechtel, DO     • predniSONE  4 mg Oral Daily [facility] Prechtel, DO     • topiramate  100 mg Oral HS Dorian Shaver DO          Today, Patient Was Seen By: Rodney Hahn PA-C    **Please Note: This note may have been constructed using a voice recognition system. **

## 2023-08-10 NOTE — PLAN OF CARE
Problem: Potential for Falls  Goal: Patient will remain free of falls  Description: INTERVENTIONS:  - Educate patient/family on patient safety including physical limitations  - Instruct patient to call for assistance with activity   - Consult OT/PT to assist with strengthening/mobility   - Keep Call bell within reach  - Keep bed low and locked with side rails adjusted as appropriate  - Keep care items and personal belongings within reach  - Initiate and maintain comfort rounds  - Make Fall Risk Sign visible to staff  - Offer Toileting every  Hours, in advance of need  - Initiate/Maintain alarm  - Obtain necessary fall risk management equipment:   - Apply yellow socks and bracelet for high fall risk patients  - Consider moving patient to room near nurses station  Outcome: Progressing     Problem: MOBILITY - ADULT  Goal: Maintain or return to baseline ADL function  Description: INTERVENTIONS:  -  Assess patient's ability to carry out ADLs; assess patient's baseline for ADL function and identify physical deficits which impact ability to perform ADLs (bathing, care of mouth/teeth, toileting, grooming, dressing, etc.)  - Assess/evaluate cause of self-care deficits   - Assess range of motion  - Assess patient's mobility; develop plan if impaired  - Assess patient's need for assistive devices and provide as appropriate  - Encourage maximum independence but intervene and supervise when necessary  - Involve family in performance of ADLs  - Assess for home care needs following discharge   - Consider OT consult to assist with ADL evaluation and planning for discharge  - Provide patient education as appropriate  Outcome: Progressing  Goal: Maintains/Returns to pre admission functional level  Description: INTERVENTIONS:  - Perform BMAT or MOVE assessment daily.   - Set and communicate daily mobility goal to care team and patient/family/caregiver.    - Collaborate with rehabilitation services on mobility goals if consulted  - Perform Range of Motion  times a day. - Reposition patient every  hours.   - Dangle patient  times a day  - Stand patient  times a day  - Ambulate patient  times a day  - Out of bed to chair  times a day   - Out of bed for meals  times a day  - Out of bed for toileting  - Record patient progress and toleration of activity level   Outcome: Progressing     Problem: PAIN - ADULT  Goal: Verbalizes/displays adequate comfort level or baseline comfort level  Description: Interventions:  - Encourage patient to monitor pain and request assistance  - Assess pain using appropriate pain scale  - Administer analgesics based on type and severity of pain and evaluate response  - Implement non-pharmacological measures as appropriate and evaluate response  - Consider cultural and social influences on pain and pain management  - Notify physician/advanced practitioner if interventions unsuccessful or patient reports new pain  Outcome: Progressing     Problem: INFECTION - ADULT  Goal: Absence or prevention of progression during hospitalization  Description: INTERVENTIONS:  - Assess and monitor for signs and symptoms of infection  - Monitor lab/diagnostic results  - Monitor all insertion sites, i.e. indwelling lines, tubes, and drains  - Monitor endotracheal if appropriate and nasal secretions for changes in amount and color  - [place] appropriate cooling/warming therapies per order  - Administer medications as ordered  - Instruct and encourage patient and family to use good hand hygiene technique  - Identify and instruct in appropriate isolation precautions for identified infection/condition  Outcome: Progressing  Goal: Absence of fever/infection during neutropenic period  Description: INTERVENTIONS:  - Monitor WBC     Outcome: Progressing     Problem: SAFETY ADULT  Goal: Patient will remain free of falls  Description: INTERVENTIONS:  - Educate patient/family on patient safety including physical limitations  - Instruct patient to call for assistance with activity   - Consult OT/PT to assist with strengthening/mobility   - Keep Call bell within reach  - Keep bed low and locked with side rails adjusted as appropriate  - Keep care items and personal belongings within reach  - Initiate and maintain comfort rounds  - Make Fall Risk Sign visible to staff  - Offer Toileting every  Hours, in advance of need  - Initiate/Maintain alarm  - Obtain necessary fall risk management equipment:   - Apply yellow socks and bracelet for high fall risk patients  - Consider moving patient to room near nurses station  Outcome: Progressing  Goal: Maintain or return to baseline ADL function  Description: INTERVENTIONS:  -  Assess patient's ability to carry out ADLs; assess patient's baseline for ADL function and identify physical deficits which impact ability to perform ADLs (bathing, care of mouth/teeth, toileting, grooming, dressing, etc.)  - Assess/evaluate cause of self-care deficits   - Assess range of motion  - Assess patient's mobility; develop plan if impaired  - Assess patient's need for assistive devices and provide as appropriate  - Encourage maximum independence but intervene and supervise when necessary  - Involve family in performance of ADLs  - Assess for home care needs following discharge   - Consider OT consult to assist with ADL evaluation and planning for discharge  - Provide patient education as appropriate  Outcome: Progressing  Goal: Maintains/Returns to pre admission functional level  Description: INTERVENTIONS:  - Perform BMAT or MOVE assessment daily.   - Set and communicate daily mobility goal to care team and patient/family/caregiver. - Collaborate with rehabilitation services on mobility goals if consulted  - Perform Range of Motion  times a day. - Reposition patient every  hours.   - Dangle patient  times a day  - Stand patient  times a day  - Ambulate patient  times a day  - Out of bed to chair  times a day   - Out of bed for meals 3x times a day  - Out of bed for toileting  - Record patient progress and toleration of activity level   Outcome: Progressing     Problem: DISCHARGE PLANNING  Goal: Discharge to home or other facility with appropriate resources  Description: INTERVENTIONS:  - Identify barriers to discharge w/patient and caregiver  - Arrange for needed discharge resources and transportation as appropriate  - Identify discharge learning needs (meds, wound care, etc.)  - Arrange for interpretive services to assist at discharge as needed  - Refer to Case Management Department for coordinating discharge planning if the patient needs post-hospital services based on physician/advanced practitioner order or complex needs related to functional status, cognitive ability, or social support system  Outcome: Progressing     Problem: Knowledge Deficit  Goal: Patient/family/caregiver demonstrates understanding of disease process, treatment plan, medications, and discharge instructions  Description: Complete learning assessment and assess knowledge base.   Interventions:  - Provide teaching at level of understanding  - Provide teaching via preferred learning methods  Outcome: Progressing     Problem: Prexisting or High Potential for Compromised Skin Integrity  Goal: Skin integrity is maintained or improved  Description: INTERVENTIONS:  - Identify patients at risk for skin breakdown  - Assess and monitor skin integrity  - Assess and monitor nutrition and hydration status  - Monitor labs   - Assess for incontinence   - Turn and reposition patient  - Assist with mobility/ambulation  - Relieve pressure over bony prominences  - Avoid friction and shearing  - Provide appropriate hygiene as needed including keeping skin clean and dry  - Evaluate need for skin moisturizer/barrier cream  - Collaborate with interdisciplinary team   - Patient/family teaching  - Consider wound care consult   Outcome: Progressing

## 2023-08-11 LAB
ANION GAP SERPL CALCULATED.3IONS-SCNC: 5 MMOL/L
ATRIAL RATE: 68 BPM
BACTERIA SPEC ANAEROBE CULT: NORMAL
BACTERIA WND AEROBE CULT: ABNORMAL
BUN SERPL-MCNC: 16 MG/DL (ref 5–25)
CALCIUM SERPL-MCNC: 8.4 MG/DL (ref 8.4–10.2)
CHLORIDE SERPL-SCNC: 108 MMOL/L (ref 96–108)
CO2 SERPL-SCNC: 25 MMOL/L (ref 21–32)
CREAT SERPL-MCNC: 0.92 MG/DL (ref 0.6–1.3)
ERYTHROCYTE [DISTWIDTH] IN BLOOD BY AUTOMATED COUNT: 15.9 % (ref 11.6–15.1)
GFR SERPL CREATININE-BSD FRML MDRD: 66 ML/MIN/1.73SQ M
GLUCOSE SERPL-MCNC: 185 MG/DL (ref 65–140)
GLUCOSE SERPL-MCNC: 215 MG/DL (ref 65–140)
GLUCOSE SERPL-MCNC: 218 MG/DL (ref 65–140)
GLUCOSE SERPL-MCNC: 225 MG/DL (ref 65–140)
GLUCOSE SERPL-MCNC: 304 MG/DL (ref 65–140)
GRAM STN SPEC: ABNORMAL
GRAM STN SPEC: ABNORMAL
HCT VFR BLD AUTO: 30.1 % (ref 34.8–46.1)
HGB BLD-MCNC: 8.9 G/DL (ref 11.5–15.4)
MAGNESIUM SERPL-MCNC: 1.9 MG/DL (ref 1.9–2.7)
MCH RBC QN AUTO: 23.9 PG (ref 26.8–34.3)
MCHC RBC AUTO-ENTMCNC: 29.6 G/DL (ref 31.4–37.4)
MCV RBC AUTO: 81 FL (ref 82–98)
P AXIS: 49 DEGREES
PLATELET # BLD AUTO: 423 THOUSANDS/UL (ref 149–390)
PMV BLD AUTO: 11.3 FL (ref 8.9–12.7)
POTASSIUM SERPL-SCNC: 4 MMOL/L (ref 3.5–5.3)
PR INTERVAL: 178 MS
QRS AXIS: -2 DEGREES
QRSD INTERVAL: 100 MS
QT INTERVAL: 456 MS
QTC INTERVAL: 484 MS
RBC # BLD AUTO: 3.73 MILLION/UL (ref 3.81–5.12)
SODIUM SERPL-SCNC: 138 MMOL/L (ref 135–147)
T WAVE AXIS: 25 DEGREES
VENTRICULAR RATE: 68 BPM
WBC # BLD AUTO: 12.03 THOUSAND/UL (ref 4.31–10.16)

## 2023-08-11 PROCEDURE — 97110 THERAPEUTIC EXERCISES: CPT

## 2023-08-11 PROCEDURE — 80048 BASIC METABOLIC PNL TOTAL CA: CPT

## 2023-08-11 PROCEDURE — 85027 COMPLETE CBC AUTOMATED: CPT

## 2023-08-11 PROCEDURE — 99232 SBSQ HOSP IP/OBS MODERATE 35: CPT

## 2023-08-11 PROCEDURE — 83735 ASSAY OF MAGNESIUM: CPT

## 2023-08-11 PROCEDURE — 82948 REAGENT STRIP/BLOOD GLUCOSE: CPT

## 2023-08-11 PROCEDURE — 93010 ELECTROCARDIOGRAM REPORT: CPT | Performed by: INTERNAL MEDICINE

## 2023-08-11 PROCEDURE — 97530 THERAPEUTIC ACTIVITIES: CPT

## 2023-08-11 PROCEDURE — 93005 ELECTROCARDIOGRAM TRACING: CPT

## 2023-08-11 RX ORDER — INSULIN LISPRO 100 [IU]/ML
7 INJECTION, SOLUTION INTRAVENOUS; SUBCUTANEOUS
Status: DISCONTINUED | OUTPATIENT
Start: 2023-08-11 | End: 2023-08-15 | Stop reason: HOSPADM

## 2023-08-11 RX ORDER — MAGNESIUM SULFATE 1 G/100ML
1 INJECTION INTRAVENOUS ONCE
Status: COMPLETED | OUTPATIENT
Start: 2023-08-11 | End: 2023-08-11

## 2023-08-11 RX ORDER — LANOLIN ALCOHOL/MO/W.PET/CERES
400 CREAM (GRAM) TOPICAL ONCE
Status: COMPLETED | OUTPATIENT
Start: 2023-08-11 | End: 2023-08-11

## 2023-08-11 RX ADMIN — MAGNESIUM OXIDE TAB 400 MG (241.3 MG ELEMENTAL MG) 400 MG: 400 (241.3 MG) TAB at 12:26

## 2023-08-11 RX ADMIN — LOSARTAN POTASSIUM 100 MG: 50 TABLET, FILM COATED ORAL at 08:11

## 2023-08-11 RX ADMIN — INSULIN GLARGINE 25 UNITS: 100 INJECTION, SOLUTION SUBCUTANEOUS at 08:11

## 2023-08-11 RX ADMIN — CEFAZOLIN SODIUM 2000 MG: 2 SOLUTION INTRAVENOUS at 06:04

## 2023-08-11 RX ADMIN — INSULIN LISPRO 7 UNITS: 100 INJECTION, SOLUTION INTRAVENOUS; SUBCUTANEOUS at 17:04

## 2023-08-11 RX ADMIN — AMLODIPINE BESYLATE 5 MG: 5 TABLET ORAL at 08:10

## 2023-08-11 RX ADMIN — GABAPENTIN 100 MG: 100 CAPSULE ORAL at 08:11

## 2023-08-11 RX ADMIN — DOCUSATE SODIUM 100 MG: 100 CAPSULE, LIQUID FILLED ORAL at 08:11

## 2023-08-11 RX ADMIN — PREDNISONE 4 MG: 1 TABLET ORAL at 08:11

## 2023-08-11 RX ADMIN — APIXABAN 10 MG: 5 TABLET, FILM COATED ORAL at 17:03

## 2023-08-11 RX ADMIN — INSULIN LISPRO 2 UNITS: 100 INJECTION, SOLUTION INTRAVENOUS; SUBCUTANEOUS at 21:36

## 2023-08-11 RX ADMIN — INSULIN LISPRO 4 UNITS: 100 INJECTION, SOLUTION INTRAVENOUS; SUBCUTANEOUS at 17:04

## 2023-08-11 RX ADMIN — CEFAZOLIN SODIUM 2000 MG: 2 SOLUTION INTRAVENOUS at 22:11

## 2023-08-11 RX ADMIN — INSULIN GLARGINE 25 UNITS: 100 INJECTION, SOLUTION SUBCUTANEOUS at 21:35

## 2023-08-11 RX ADMIN — ATORVASTATIN CALCIUM 40 MG: 40 TABLET, FILM COATED ORAL at 17:03

## 2023-08-11 RX ADMIN — TOPIRAMATE 100 MG: 25 TABLET, FILM COATED ORAL at 21:35

## 2023-08-11 RX ADMIN — METOPROLOL TARTRATE 100 MG: 100 TABLET, FILM COATED ORAL at 21:35

## 2023-08-11 RX ADMIN — APIXABAN 10 MG: 5 TABLET, FILM COATED ORAL at 08:10

## 2023-08-11 RX ADMIN — BUDESONIDE AND FORMOTEROL FUMARATE DIHYDRATE 1 PUFF: 160; 4.5 AEROSOL RESPIRATORY (INHALATION) at 08:11

## 2023-08-11 RX ADMIN — INSULIN LISPRO 1 UNITS: 100 INJECTION, SOLUTION INTRAVENOUS; SUBCUTANEOUS at 08:10

## 2023-08-11 RX ADMIN — GABAPENTIN 100 MG: 100 CAPSULE ORAL at 17:03

## 2023-08-11 RX ADMIN — MAGNESIUM SULFATE HEPTAHYDRATE 1 G: 1 INJECTION, SOLUTION INTRAVENOUS at 08:15

## 2023-08-11 RX ADMIN — INSULIN LISPRO 7 UNITS: 100 INJECTION, SOLUTION INTRAVENOUS; SUBCUTANEOUS at 12:25

## 2023-08-11 RX ADMIN — CEFAZOLIN SODIUM 2000 MG: 2 SOLUTION INTRAVENOUS at 14:05

## 2023-08-11 RX ADMIN — HYDRALAZINE HYDROCHLORIDE 10 MG: 10 TABLET, FILM COATED ORAL at 17:03

## 2023-08-11 RX ADMIN — DOCUSATE SODIUM 100 MG: 100 CAPSULE, LIQUID FILLED ORAL at 17:03

## 2023-08-11 RX ADMIN — BUDESONIDE AND FORMOTEROL FUMARATE DIHYDRATE 1 PUFF: 160; 4.5 AEROSOL RESPIRATORY (INHALATION) at 17:03

## 2023-08-11 RX ADMIN — ASPIRIN 81 MG: 81 TABLET, COATED ORAL at 08:11

## 2023-08-11 RX ADMIN — Medication 2000 UNITS: at 08:11

## 2023-08-11 RX ADMIN — FUROSEMIDE 20 MG: 20 TABLET ORAL at 17:03

## 2023-08-11 RX ADMIN — METOPROLOL TARTRATE 100 MG: 100 TABLET, FILM COATED ORAL at 08:11

## 2023-08-11 RX ADMIN — HYDRALAZINE HYDROCHLORIDE 10 MG: 10 TABLET, FILM COATED ORAL at 21:35

## 2023-08-11 RX ADMIN — FUROSEMIDE 20 MG: 20 TABLET ORAL at 08:10

## 2023-08-11 RX ADMIN — INSULIN LISPRO 2 UNITS: 100 INJECTION, SOLUTION INTRAVENOUS; SUBCUTANEOUS at 12:25

## 2023-08-11 RX ADMIN — HYDRALAZINE HYDROCHLORIDE 10 MG: 10 TABLET, FILM COATED ORAL at 08:10

## 2023-08-11 RX ADMIN — INSULIN LISPRO 5 UNITS: 100 INJECTION, SOLUTION INTRAVENOUS; SUBCUTANEOUS at 08:10

## 2023-08-11 NOTE — DISCHARGE INSTR - OTHER ORDERS
Wound Care Plan:   1-Hydraguard lotion to bilateral buttocks and sacrum twice daily and as needed. 2-Elevate lower extremities for edema management. Ensure heels float off of bed/chair surface to offload pressure. 3-Offloading air cushion in chair when out of bed. 4-Moisturize skin daily with skin nourishing lotion. 5-Turn/reposition every 2 hours while in bed; and weight shift frequently while in chair for pressure re-distribution on skin. 6-Bilateral lateral lower legs--cleanse with normal saline, pat dry. Apply Allevyn Life foam dressings. Change dressings every 3 days and as needed with soilage/dislodgement. 7-Bilateral feet: Please wash with normal saline, pat dry. Apply Xeroform, 4 x 4 gauze, ABD Kerlix very light Ace. Offload lower extremities on foam wedges or pillows. Follow-up at the [facility].

## 2023-08-11 NOTE — PLAN OF CARE
Problem: Potential for Falls  Goal: Patient will remain free of falls  Description: INTERVENTIONS:  - Educate patient/family on patient safety including physical limitations  - Instruct patient to call for assistance with activity   - Consult OT/PT to assist with strengthening/mobility   - Keep Call bell within reach  - Keep bed low and locked with side rails adjusted as appropriate  - Keep care items and personal belongings within reach  - Initiate and maintain comfort rounds  - Make Fall Risk Sign visible to staff  - Apply yellow socks and bracelet for high fall risk patients  - Consider moving patient to room near nurses station  Outcome: Progressing     Problem: MOBILITY - ADULT  Goal: Maintain or return to baseline ADL function  Description: INTERVENTIONS:  -  Assess patient's ability to carry out ADLs; assess patient's baseline for ADL function and identify physical deficits which impact ability to perform ADLs (bathing, care of mouth/teeth, toileting, grooming, dressing, etc.)  - Assess/evaluate cause of self-care deficits   - Assess range of motion  - Assess patient's mobility; develop plan if impaired  - Assess patient's need for assistive devices and provide as appropriate  - Encourage maximum independence but intervene and supervise when necessary  - Involve family in performance of ADLs  - Assess for home care needs following discharge   - Consider OT consult to assist with ADL evaluation and planning for discharge  - Provide patient education as appropriate  Outcome: Progressing  Goal: Maintains/Returns to pre admission functional level  Description: INTERVENTIONS:  - Perform BMAT or MOVE assessment daily.   - Set and communicate daily mobility goal to care team and patient/family/caregiver. - Collaborate with rehabilitation services on mobility goals if consulted  - Perform Range of Motion  times a day.   - Out of bed for toileting  - Record patient progress and toleration of activity level Outcome: Progressing     Problem: PAIN - ADULT  Goal: Verbalizes/displays adequate comfort level or baseline comfort level  Description: Interventions:  - Encourage patient to monitor pain and request assistance  - Assess pain using appropriate pain scale  - Administer analgesics based on type and severity of pain and evaluate response  - Implement non-pharmacological measures as appropriate and evaluate response  - Consider cultural and social influences on pain and pain management  - Notify physician/advanced practitioner if interventions unsuccessful or patient reports new pain  Outcome: Progressing     Problem: INFECTION - ADULT  Goal: Absence or prevention of progression during hospitalization  Description: INTERVENTIONS:  - Assess and monitor for signs and symptoms of infection  - Monitor lab/diagnostic results  - Monitor all insertion sites, i.e. indwelling lines, tubes, and drains  - Monitor endotracheal if appropriate and nasal secretions for changes in amount and color  - [place] appropriate cooling/warming therapies per order  - Administer medications as ordered  - Instruct and encourage patient and family to use good hand hygiene technique  - Identify and instruct in appropriate isolation precautions for identified infection/condition  Outcome: Progressing  Goal: Absence of fever/infection during neutropenic period  Description: INTERVENTIONS:  - Monitor WBC    Outcome: Progressing     Problem: SAFETY ADULT  Goal: Patient will remain free of falls  Description: INTERVENTIONS:  - Educate patient/family on patient safety including physical limitations  - Instruct patient to call for assistance with activity   - Consult OT/PT to assist with strengthening/mobility   - Keep Call bell within reach  - Keep bed low and locked with side rails adjusted as appropriate  - Keep care items and personal belongings within reach  - Initiate and maintain comfort rounds  - Make Fall Risk Sign visible to staff  - Apply yellow socks and bracelet for high fall risk patients  - Consider moving patient to room near nurses station  Outcome: Progressing  Goal: Maintain or return to baseline ADL function  Description: INTERVENTIONS:  -  Assess patient's ability to carry out ADLs; assess patient's baseline for ADL function and identify physical deficits which impact ability to perform ADLs (bathing, care of mouth/teeth, toileting, grooming, dressing, etc.)  - Assess/evaluate cause of self-care deficits   - Assess range of motion  - Assess patient's mobility; develop plan if impaired  - Assess patient's need for assistive devices and provide as appropriate  - Encourage maximum independence but intervene and supervise when necessary  - Involve family in performance of ADLs  - Assess for home care needs following discharge   - Consider OT consult to assist with ADL evaluation and planning for discharge  - Provide patient education as appropriate  Outcome: Progressing  Goal: Maintains/Returns to pre admission functional level  Description: INTERVENTIONS:  - Perform BMAT or MOVE assessment daily.   - Set and communicate daily mobility goal to care team and patient/family/caregiver.    - Collaborate with rehabilitation services on mobility goals if consulted  - Out of bed for toileting  - Record patient progress and toleration of activity level   Outcome: Progressing     Problem: DISCHARGE PLANNING  Goal: Discharge to home or other facility with appropriate resources  Description: INTERVENTIONS:  - Identify barriers to discharge w/patient and caregiver  - Arrange for needed discharge resources and transportation as appropriate  - Identify discharge learning needs (meds, wound care, etc.)  - Arrange for interpretive services to assist at discharge as needed  - Refer to Case Management Department for coordinating discharge planning if the patient needs post-hospital services based on physician/advanced practitioner order or complex needs related to functional status, cognitive ability, or social support system  Outcome: Progressing     Problem: Knowledge Deficit  Goal: Patient/family/caregiver demonstrates understanding of disease process, treatment plan, medications, and discharge instructions  Description: Complete learning assessment and assess knowledge base.   Interventions:  - Provide teaching at level of understanding  - Provide teaching via preferred learning methods  Outcome: Progressing     Problem: Prexisting or High Potential for Compromised Skin Integrity  Goal: Skin integrity is maintained or improved  Description: INTERVENTIONS:  - Identify patients at risk for skin breakdown  - Assess and monitor skin integrity  - Assess and monitor nutrition and hydration status  - Monitor labs   - Assess for incontinence   - Turn and reposition patient  - Assist with mobility/ambulation  - Relieve pressure over bony prominences  - Avoid friction and shearing  - Provide appropriate hygiene as needed including keeping skin clean and dry  - Evaluate need for skin moisturizer/barrier cream  - Collaborate with interdisciplinary team   - Patient/family teaching  - Consider wound care consult   Outcome: Progressing

## 2023-08-11 NOTE — PROGRESS NOTES
[facility]  Progress Note  Name: Laird Bamberger  MRN: [id number]  Unit/Bed#: [address] [place] 222-02 I Date of Admission: 8/8/2023   Date of Service: 8/11/2023 I Hospital Day: 3    Assessment/Plan   * Pulmonary embolism [facility])  Assessment & Plan  · Patient has acute pulmonary embolism and lower extremity DVT  · Outpatient venous duplex showed acute non occlusive DVT in the left popliteal vein and in the posterior tibial veins  · ECHO stable from previous  · Continue on eliquis     Diabetic foot ulcer [facility])  Assessment & Plan  Lab Results   Component Value Date    HGBA1C 10.2 (A) 07/21/2023       Recent Labs     08/10/23  1103 08/10/23  1626 08/10/23  2126 08/11/23  0737   POCGLU 182* 291* 289* 185*       Blood Sugar Average: Last 72 hrs:  (P) 206.0604591888840501   · large right plantar surface diabetic foot ulcer almost the entire length of her foot no exposed bone noted   · Continue on ancef   · Blood cultures negative at 24 hours, follow would cultures results in gram positive rods   · X rays bilateral feet with no evidence of osteomyelitis   · Podiatry consulted no need for surgical intervention    PMR (polymyalgia rheumatica) ([facility])  Assessment & Plan  · On chronic prednisone    Uncontrolled type 2 diabetes mellitus with hyperglycemia [facility])  Assessment & Plan  Lab Results   Component Value Date    HGBA1C 10.2 (A) 07/21/2023       Recent Labs     08/10/23  1103 08/10/23  1626 08/10/23  2126 08/11/23  0737   POCGLU 182* 291* 289* 185*       Blood Sugar Average: Last 72 hrs:  (P) 206.2190413776701452     · Continue Lantus 25 units and increased to 7 units 3  times daily Humalog. Add Humalog insulin sliding scale    QT prolongation  Assessment & Plan  · Prolonged Qtc improved to 490 on EKG yesterday  · Awaiting morning EKG          VTE Pharmacologic Prophylaxis: VTE Score: 3 Moderate Risk (Score 3-4) - Pharmacological DVT Prophylaxis Ordered: apixaban (Eliquis). Patient Centered Rounds:  I performed bedside rounds with nursing staff today. Discussions with Specialists or Other Care Team Provider: CM    Education and Discussions with Family / Patient: Updated  (son) at bedside. Total Time Spent on Date of Encounter in care of patient: 45 minutes This time was spent on one or more of the following: performing physical exam; counseling and coordination of care; obtaining or reviewing history; documenting in the medical record; reviewing/ordering tests, medications or procedures; communicating with other healthcare professionals and discussing with patient's family/caregivers. Current Length of Stay: 3 day(s)  Current Patient Status: Inpatient   Certification Statement: The patient will continue to require additional inpatient hospital stay due to pending placement in acute rehab  Discharge Plan: Anticipate discharge tomorrow to rehab facility. Code Status: Level 1 - Full Code    Subjective:   Patient was seen laying in bed today. She reports feeling well she denies any acute complaints     Objective:     Vitals:   Temp (24hrs), Av.7 °F (37.1 °C), Min:98.1 °F (36.7 °C), Max:99.2 °F (37.3 °C)    Temp:  [98.1 °F (36.7 °C)-99.2 °F (37.3 °C)] 99.2 °F (37.3 °C)  HR:  [63-75] 67  Resp:  [14-18] 18  BP: (105-149)/(55-76) 143/67  SpO2:  [95 %-97 %] 95 %  Body mass index is 41.97 kg/m². Input and Output Summary (last 24 hours): Intake/Output Summary (Last 24 hours) at 2023 0802  Last data filed at 8/10/2023 0815  Gross per 24 hour   Intake --   Output 650 ml   Net -650 ml       Physical Exam:   Physical Exam  Vitals and nursing note reviewed. Constitutional:       Appearance: Normal appearance. She is obese. HENT:      Head: Normocephalic and atraumatic. Eyes:      General: No scleral icterus. Cardiovascular:      Rate and Rhythm: Normal rate and regular rhythm. Pulmonary:      Effort: Pulmonary effort is normal.      Breath sounds: Normal breath sounds. Abdominal:      General: Abdomen is flat. Bowel sounds are normal.      Palpations: Abdomen is soft. Tenderness: There is no abdominal tenderness. Musculoskeletal:      Right lower leg: Edema present. Left lower leg: Edema present. Skin:     General: Skin is warm and dry. Comments: Bilateral feet in dressings   Neurological:      Mental Status: She is alert. Mental status is at baseline. Psychiatric:         Mood and Affect: Mood normal.         Behavior: Behavior normal.          Additional Data:     Labs:  Results from last 7 days   Lab Units 08/11/23  0454 08/10/23  0512   WBC Thousand/uL 12.03* 11.62*   HEMOGLOBIN g/dL 8.9* 8.6*   HEMATOCRIT % 30.1* 28.4*   PLATELETS Thousands/uL 423* 413*   NEUTROS PCT %  --  62   LYMPHS PCT %  --  25   MONOS PCT %  --  8   EOS PCT %  --  3     Results from last 7 days   Lab Units 08/11/23  0454 08/09/23  0628 08/08/23  1302   SODIUM mmol/L 138   < > 136   POTASSIUM mmol/L 4.0   < > 3.5   CHLORIDE mmol/L 108   < > 101   CO2 mmol/L 25   < > 27   BUN mg/dL 16   < > 19   CREATININE mg/dL 0.92   < > 0.85   ANION GAP mmol/L 5   < > 8   CALCIUM mg/dL 8.4   < > 9.3   ALBUMIN g/dL  --   --  3.4*   TOTAL BILIRUBIN mg/dL  --   --  0.79   ALK PHOS U/L  --   --  132*   ALT U/L  --   --  7   AST U/L  --   --  10*   GLUCOSE RANDOM mg/dL 215*   < > 260*    < > = values in this interval not displayed.      Results from last 7 days   Lab Units 08/08/23  2251   INR  1.13     Results from last 7 days   Lab Units 08/11/23  0737 08/10/23  2126 08/10/23  1626 08/10/23  1103 08/10/23  0714 08/09/23  2108 08/09/23  1703 08/09/23  1114 08/09/23  0743 08/08/23  2209 08/08/23  1148   POC GLUCOSE mg/dl 185* 289* 291* 182* 210* 256* 207* 107 94 167* 286*         Results from last 7 days   Lab Units 08/08/23  1533   LACTIC ACID mmol/L 1.8       Lines/Drains:  Invasive Devices     Peripheral Intravenous Line  Duration           Peripheral IV 08/08/23 Right Antecubital 2 days Drain  Duration           External Urinary Catheter 2 days                      Imaging: No pertinent imaging reviewed. Recent Cultures (last 7 days):   Results from last 7 days   Lab Units 08/09/23  0945 08/08/23  2251   BLOOD CULTURE   --  No Growth at 48 hrs. No Growth at 48 hrs.    GRAM STAIN RESULT  3+ Polys*  2+ Gram positive rods*  --    WOUND CULTURE  Culture too young- will reincubate  --        Last 24 Hours Medication List:   Current Facility-Administered Medications   Medication Dose Route Frequency Provider Last Rate   • acetaminophen  650 mg Oral Q6H PRN Dimas Perez, DO     • amLODIPine  5 mg Oral Daily Mic S Prechtel, DO     • apixaban  10 mg Oral BID Millicent Prajapati PA-C     • aspirin  81 mg Oral Daily Mic S Prechtel, DO     • atorvastatin  40 mg Oral Daily [employer] S Prechtel, DO     • budesonide-formoterol  1 puff Inhalation BID Dimas Perez, DO     • cefazolin  2,000 mg Intravenous Q8H Valentino Jeffery PA-C 2,000 mg (08/11/23 0604)   • cholecalciferol  2,000 Units Oral Daily Mic S Prechtel, DO     • docusate sodium  100 mg Oral BID Mic S Prechtel, DO     • furosemide  20 mg Oral BID Mic S Prechtel, DO     • gabapentin  100 mg Oral BID Mic S Prechtel, DO     • hydrALAZINE  10 mg Oral TID Dimas Perez, DO     • insulin glargine  25 Units Subcutaneous Q12H [facility] S Prechtel, DO     • insulin lispro  1-5 Units Subcutaneous HS Mic S Prechtel, DO     • insulin lispro  1-6 Units Subcutaneous TID AC Mic S Prechtel, DO     • insulin lispro  7 Units Subcutaneous TID [employer] Deja Sears PA-C     • loperamide  2 mg Oral 4x Daily PRN Dimas Perez, DO     • losartan  100 mg Oral Daily Mic S Prechtel, DO     • magnesium sulfate  1 g Intravenous Once Millicent Prajapati PA-C     • menthol-methyl salicylate   Apply externally 4x Daily PRN Valentino Jeffery PA-C     • metoprolol tartrate  100 mg Oral Q12H [address] NAOMI Perez, DO     • ondansetron  4 mg Intravenous Q6H PRN Ynes Perez, DO     • predniSONE  4 mg Oral Daily Mic Perez, DO     • topiramate  100 mg Oral HS Kristopher Akins,           Today, Patient Was Seen By: Brock Heredia PA-C    **Please Note: This note may have been constructed using a voice recognition system. **

## 2023-08-11 NOTE — PLAN OF CARE
Problem: PHYSICAL THERAPY ADULT  Goal: Performs mobility at highest level of function for planned discharge setting. See evaluation for individualized goals. Description: Treatment/Interventions: Functional transfer training, LE strengthening/ROM, Therapeutic exercise, Endurance training, Patient/family training, Bed mobility, Spoke to nursing, OT, Family, Gait training  Equipment Recommended: Wheelchair, [place] (pt has w/c)       See flowsheet documentation for full assessment, interventions and recommendations. Outcome: Progressing  Note: Prognosis: Fair  Problem List: Decreased strength, Decreased range of motion, Decreased endurance, Impaired balance, Decreased mobility, Decreased coordination, Impaired judgement, Obesity, Pain, Decreased skin integrity, Impaired vision  Assessment: Pt. has NWBing restrictions per Podiatry note on 08/09/23 - WBAT for transfers only and maintain NWBing to BLEs otherwise. Informed the [address] status to PT Seven and educated pt. about it. Donned surgcal shoes on BLE with RN Oksana's approval. Pt. reported she felt better having the surgical shoe on . Her transfers improved as reps increased. Cues for hand placement, posture and technique given. Pt. able to perform STS transfers repepatedly with Mod A x 1 at the first trial and Min A x 1 with all subsequent trials. Pt. able to transfer from chair to EOB and then abck to chair. Cues for descending to the chair as well. pt. able to maintain upright standing and requested to walk however could not ambulate due to [address] restriction. RN was informed of patient status. pt. seated on chair post session with all needs within reach. Continue to follow per PT POC. Reported to PT for change goals.  Podaitry note 08/09/23 - Weightbearing status: Weightbearing to feet for transfers only, otherwise please stay nonweightbearing as much as possible  Barriers to Discharge: None  Barriers to Discharge Comments: homelessness  PT Discharge Recommendation: Post acute rehabilitation services    See flowsheet documentation for full assessment.

## 2023-08-11 NOTE — ASSESSMENT & PLAN NOTE
Lab Results   Component Value Date    HGBA1C 10.2 (A) 07/21/2023       Recent Labs     08/10/23  1103 08/10/23  1626 08/10/23  2126 08/11/23  0737   POCGLU 182* 291* 289* 185*       Blood Sugar Average: Last 72 hrs:  (P) [id number]     · Continue Lantus 25 units and increased to 7 units 3  times daily Humalog.   Add Humalog insulin sliding scale

## 2023-08-11 NOTE — ASSESSMENT & PLAN NOTE
· Patient has acute pulmonary embolism and lower extremity DVT  · Outpatient venous duplex showed acute non occlusive DVT in the left popliteal vein and in the posterior tibial veins  · ECHO stable from previous  · Continue on eliquis

## 2023-08-11 NOTE — WOUND OSTOMY CARE
Consult Note - Wound   Abner Patricio 57 y.o. female MRN: [id number]  Unit/Bed#: [facility] 2 [address] [facility] [place] 222-02 Encounter: [id number]      History and Present Illness:  57year old female presented to the hospital with shortness of breath and right foot wound. Patient found to have acute pulmonary emboli. Patient's history significant for DM, polymyalgia rheumatica, HTN. Assessment Findings:   Patient agreeable to assessment. She is sitting up in the chair, able to stand with assist x 2 and walker. Continent of bowel. Occasionally incontinent of urine. Bilateral buttocks and sacrum intact. Dressings dry and intact to bilateral feet with surgical shoes per podiatry team.  1.  Bilateral lateral pretibial wounds--right side pink, partial thickness, round, with scant bloody drainage (possibly open blister). Left recently epithelialized, pink, dry. Soraya-wound areas intact with moderate edema. See flowsheet for wound details. Wound Care Plan:   1-Hydraguard lotion to bilateral buttocks and sacrum twice daily and as needed. 2-Elevate lower extremities for edema management. Ensure heels float off of bed/chair surface to offload pressure. 3-Offloading air cushion in chair when out of bed. 4-Moisturize skin daily with skin nourishing cream.  5-Turn/reposition every 2 hours while in bed; and weight shift frequently while in chair for pressure re-distribution on skin. 6-Bilateral lateral lower legs--cleanse with normal saline, pat dry. Apply Allevyn Life foam dressings. Change dressings every 3 days and as needed with soilage/dislodgement. 7-Bilateral feet per podiatry team.    Wound care team to follow. Plan of care reviewed with primary RN. Wound 08/08/23 Pretibial Right (Active)   Wound Image   08/11/23 1428   Wound Description Bleeding;Pink 08/11/23 1428   Soraya-wound Assessment Edema; Intact 08/11/23 1428   Wound Length (cm) 1 cm 08/11/23 1428   Wound Width (cm) 1 cm 08/11/23 1428   Wound Depth (cm) 0.1 cm 08/11/23 1428   Wound Surface Area (cm^2) 1 cm^2 08/11/23 1428   Wound Volume (cm^3) 0.1 cm^3 08/11/23 1428   Calculated Wound Volume (cm^3) 0.1 cm^3 08/11/23 1428   Drainage Amount Scant 08/11/23 1428   Drainage Description Bloody 08/11/23 1428   Non-staged Wound Description Partial thickness 08/11/23 1428   Treatments Cleansed;Elevated 08/11/23 1428   Dressing Foam, Silicon (eg. Allevyn, etc) 08/11/23 1428   Dressing Changed New 08/11/23 1428   Patient Tolerance Tolerated well 08/11/23 1428   Dressing Status Clean;Dry; Intact 08/11/23 1428       Wound 08/08/23 Pretibial Left (Active)   Wound Image   08/11/23 1427   Wound Description Dry; Intact; Pink 08/11/23 1427   Soraya-wound Assessment Intact;Edema 08/11/23 1427   Wound Length (cm) 0 cm 08/11/23 1427   Wound Width (cm) 0 cm 08/11/23 1427   Wound Depth (cm) 0 cm 08/11/23 1427   Wound Surface Area (cm^2) 0 cm^2 08/11/23 1427   Wound Volume (cm^3) 0 cm^3 08/11/23 1427   Calculated Wound Volume (cm^3) 0 cm^3 08/11/23 1427   Drainage Amount None 08/11/23 1427   Dressing Foam, Silicon (eg. Allevyn, etc) 08/11/23 1427   Dressing Changed New 08/11/23 1427   Patient Tolerance Tolerated well 08/11/23 1427   Dressing Status Dry;Clean; Intact 08/11/23 [id number] Oren Colmenares BSN, RN, [facility]

## 2023-08-11 NOTE — PHYSICAL THERAPY NOTE
Physical Therapy Treatment Note     08/11/23 1412   PT Last Visit   PT Visit Date 08/11/23   Note Type   Note Type Treatment   Pain Assessment   Pain Assessment Tool 0-10   Pain Score 3   Pain Location/Orientation Orientation: Bilateral;Location: Foot   Restrictions/Precautions   Weight Bearing Precautions Per Order Yes   RLE Weight Bearing Per Order (S)  NWB  (WBAT for transfers only in surgical shoes otherwise NWB to BLE per podaitry note on 08/09/23)   LLE Weight Bearing Per Order (S)  NWB  (WBAT for transfers only in surgical shoes otherwise NWB to BLE per podaitry note on 08/09/23)   Other Precautions WBS; Fall Risk;Pain;Telemetry; Visual impairment   General   Chart Reviewed Yes   Family/Caregiver Present Yes   Subjective   Subjective Pt. agreeable to PT   Transfers   Sit to Stand 4  Minimal assistance   Additional items Assist x 1;Verbal cues; Increased time required;Armrests   Stand to Sit 4  Minimal assistance   Additional items Assist x 1; Increased time required;Verbal cues;Armrests   Stand pivot 4  Minimal assistance   Additional items Assist x 1; Increased time required;Verbal cues  (with RW)   Additional Comments Repeated STS transfers x 5   Balance   Static Sitting Good   Dynamic Sitting Fair +   Static Standing Fair   Dynamic Standing Fair -   Ambulatory Fair -   Endurance Deficit   Endurance Deficit Yes   Endurance Deficit Description fatigue, general deconditioning   Activity Tolerance   Activity Tolerance Patient tolerated treatment well   Nurse Made Aware yes   Exercises   TKR Sitting;Bilateral;AROM;20 reps   Assessment   Prognosis Fair   Problem List Decreased strength;Decreased range of motion;Decreased endurance; Impaired balance;Decreased mobility; Decreased coordination; Impaired judgement;Obesity;Pain;Decreased skin integrity; Impaired vision   Assessment Pt. has NWBing restrictions per Podiatry note on 08/09/23 - WBAT for transfers only and maintain NWBing to BLEs otherwise.  Informed the [facility] status to PT Seven and educated pt. about it. Donned surgcal shoes on BLE with RN Oksana's approval. Pt. reported she felt better having the surgical shoe on . Her transfers improved as reps increased. Cues for hand placement, posture and technique given. Pt. able to perform STS transfers repepatedly with Mod A x 1 at the first trial and Min A x 1 with all subsequent trials. Pt. able to transfer from chair to EOB and then abck to chair. Cues for descending to the chair as well. pt. able to maintain upright standing and requested to walk however could not ambulate due to [facility] restriction. RN was informed of patient status. pt. seated on chair post session with all needs within reach. Continue to follow per PT POC. Reported to PT for change goals. Podaitry note 08/09/23 - Weightbearing status: Weightbearing to feet for transfers only, otherwise please stay nonweightbearing as much as possible   Barriers to Discharge None   Goals   Patient Goals I want to start walking   STG Expiration Date 08/23/23   PT Treatment Day 1   Plan   Treatment/Interventions Functional transfer training;LE strengthening/ROM; Therapeutic exercise;Patient/family training;Equipment eval/education;Spoke to nursing  (PT Seven)   Progress Progressing toward goals   PT Frequency 3-5x/wk   Recommendation   PT Discharge Recommendation Post acute rehabilitation services   Equipment Recommended Wheelchair;Walker   AM-PAC Basic Mobility Inpatient   Turning in Flat Bed Without Bedrails 3   Lying on Back to Sitting on Edge of Flat Bed Without Bedrails 3   Moving Bed to Chair 3   Standing Up From Chair Using Arms 3   Walk in Room 3   Climb 3-5 Stairs With Railing 2   Basic Mobility Inpatient Raw Score 17   Basic Mobility Standardized Score 39.67   Turning Head Towards Sound 4   Follow Simple Instructions 4   Low Function Basic Mobility Raw Score  25   Low Function Basic Mobility Standardized Score  39.85   Highest Level Of Mobility   -[facility] Goal 5: Stand one or more mins   TERESA-NAREN Achieved 4: Move to chair/commode   End of Consult   Patient Position at End of Consult All needs within reach;Bed/Chair alarm activated; Bedside chair           Clementine Pierre PTA    An AM-PAC basic mobility standardized score less than 42.9 suggest the patient may benefit from discharge to post-acute rehab services.

## 2023-08-11 NOTE — CASE MANAGEMENT
Case Management Discharge Planning Note    Patient name Russo Pea  Location [facility] /[facility] [address]* MRN [id number]  : 1960 Date 2023       Current Admission Date: 2023  Current Admission Diagnosis:Pulmonary embolism [facility])   Patient Active Problem List    Diagnosis Date Noted   • Pulmonary embolism ([address]) 2023   • Diabetic foot ulcer ([address]) 2023   • Ulcer of left heel ([address]) 2023   • Homeless 2023   • Renal insufficiency 2023   • Encounter for support and coordination of transition of care 2023   • Food insecurity 2023   • Leukocytosis 2023   • Friction blisters of the soles 2023   • Calculus of gallbladder without cholecystitis without obstruction 2023   • Diabetes mellitus ([address]) 2023   • Abnormal CT scan 2023   • Suspected pressure injury of deep tissue 03/15/2023   • Dyslipidemia 2023   • Insulin long-term use ([address]) 2023   • Type 2 diabetes mellitus with hyperglycemia ([address]) 2023   • Ambulatory dysfunction 2023   • Legally blind 2023   • Diarrhea 2023   • Bilateral lower extremity edema 2023   • Unsheltered homelessness 2023   • Abnormal urinalysis 2023   • Weakness 2023   • Intertrigo 2023   • Leg numbness 2023   • Unsteadiness on feet 2022   • Chronic migraine without aura, not intractable, without status migrainosus 2022   • Obstructive sleep apnea 2022   • Diabetic neuropathy ([address]) 2022   • Stage 3a chronic kidney disease ([address]) 2022   • Polymyalgia rheumatica ([address]) 2021   • Gait instability 2021   • Ulnar neuropathy of right upper extremity    • Blurry vision, bilateral 2021   • Depressed mood 10/08/2020   • Type 2 diabetes mellitus with other specified complication ([address])    • Hypertension 05/10/2020   • Chronic migraine without aura without status migrainosus, not intractable 2020 • Hypersomnia 04/06/2020   • Cerebral aneurysm without rupture 04/06/2020   • History of absence seizures 03/25/2020   • Thyroid nodule 03/06/2020   • Aneurysm ([address]) 03/05/2020   • Type 2 diabetes mellitus with hyperglycemia, with long-term current use of insulin ([address]) 02/21/2020   • Left cavernous carotid aneurysm 02/10/2020   • Polyneuropathy associated with underlying disease ([address]) 01/07/2020   • PMR (polymyalgia rheumatica) ([address]) 01/07/2020   • Thrombocytosis 01/07/2020   • Morbid obesity ([address]) 09/19/2019   • Other inflammatory and immune myopathies, not elsewhere classified 09/16/2019   • Transaminitis 09/16/2019   • Frequent headaches 07/09/2019   • Type 2 diabetes mellitus with microalbuminuria, with long-term current use of insulin ([address]) 02/01/2019   • Mild intermittent asthma without complication 15/86/0402   • Orthostatic hypotension 06/25/2018   • Lung nodules 03/22/2018   • Exertional dyspnea 02/13/2018   • Enlarged pulmonary artery ([address]) 02/13/2018   • Aortic dilatation ([address]) 02/13/2018   • Uncontrolled type 2 diabetes mellitus with hyperglycemia (HCC)    • Seizures ([address])    • Obstructive sleep apnea (adult) (pediatric) 12/18/2017   • QT prolongation 12/18/2017   • Decreased pulses in feet 12/11/2017   • Microalbuminuria 09/08/2015   • Vitamin D deficiency 06/06/2014   • Visual impairment in both eyes 12/06/2012   • Anemia 03/20/2012   • Hyperlipidemia 03/20/2012   • Class 3 severe obesity with serious comorbidity and body mass index (BMI) of 50.0 to 59.9 in adult [facility]) 03/20/2012   • Neuropathy of hand, right 03/20/2012      LOS (days): 3  Geometric Mean LOS (GMLOS) (days): 2.50  Days to GMLOS:-0.3     OBJECTIVE:  Risk of Unplanned Readmission Score: 50.66         Current admission status: Inpatient   Preferred Pharmacy:   [id number], [address]  [address] [place] [address]  Phone: [phone] Fax: [phone] [address] Clemons,[address] Floor - \[facility]", [address] [facility],  [address] [place] [address],  [address] [place] [address]  Phone: [phone] Fax: [phone]    Primary Care Provider: Stephon Monroy MD    Primary Insurance: [facility] DUAL COMPLETE [facility] HOSPITAL REP  Secondary Insurance: [facility] [employer]    DISCHARGE DETAILS:    Additional Comments: Patient reviewed during care coordination rounds with MELVA Bass who informed that pt is medically stable for discharge to [facility]. Per [facility] communication, no accepting facilities at this time. Search expanded. CM department to follow.

## 2023-08-11 NOTE — ASSESSMENT & PLAN NOTE
Lab Results   Component Value Date    HGBA1C 10.2 (A) 07/21/2023       Recent Labs     08/10/23  1103 08/10/23  1626 08/10/23  2126 08/11/23  0737   POCGLU 182* 291* 289* 185*       Blood Sugar Average: Last 72 hrs:  (P) [id number]   · large right plantar surface diabetic foot ulcer almost the entire length of her foot no exposed bone noted   · Continue on ancef   · Blood cultures negative at 24 hours, follow would cultures results in gram positive rods   · X rays bilateral feet with no evidence of osteomyelitis   · Podiatry consulted no need for surgical intervention

## 2023-08-12 LAB
ANION GAP SERPL CALCULATED.3IONS-SCNC: 6 MMOL/L
BUN SERPL-MCNC: 17 MG/DL (ref 5–25)
CALCIUM SERPL-MCNC: 8.7 MG/DL (ref 8.4–10.2)
CHLORIDE SERPL-SCNC: 107 MMOL/L (ref 96–108)
CO2 SERPL-SCNC: 26 MMOL/L (ref 21–32)
CREAT SERPL-MCNC: 0.85 MG/DL (ref 0.6–1.3)
ERYTHROCYTE [DISTWIDTH] IN BLOOD BY AUTOMATED COUNT: 15.9 % (ref 11.6–15.1)
GFR SERPL CREATININE-BSD FRML MDRD: 72 ML/MIN/1.73SQ M
GLUCOSE SERPL-MCNC: 102 MG/DL (ref 65–140)
GLUCOSE SERPL-MCNC: 118 MG/DL (ref 65–140)
GLUCOSE SERPL-MCNC: 150 MG/DL (ref 65–140)
GLUCOSE SERPL-MCNC: 152 MG/DL (ref 65–140)
GLUCOSE SERPL-MCNC: 207 MG/DL (ref 65–140)
HCT VFR BLD AUTO: 30.8 % (ref 34.8–46.1)
HGB BLD-MCNC: 9.2 G/DL (ref 11.5–15.4)
MAGNESIUM SERPL-MCNC: 2 MG/DL (ref 1.9–2.7)
MCH RBC QN AUTO: 24 PG (ref 26.8–34.3)
MCHC RBC AUTO-ENTMCNC: 29.9 G/DL (ref 31.4–37.4)
MCV RBC AUTO: 80 FL (ref 82–98)
PLATELET # BLD AUTO: 462 THOUSANDS/UL (ref 149–390)
PMV BLD AUTO: 11.1 FL (ref 8.9–12.7)
POTASSIUM SERPL-SCNC: 3.9 MMOL/L (ref 3.5–5.3)
RBC # BLD AUTO: 3.84 MILLION/UL (ref 3.81–5.12)
SODIUM SERPL-SCNC: 139 MMOL/L (ref 135–147)
WBC # BLD AUTO: 13.09 THOUSAND/UL (ref 4.31–10.16)

## 2023-08-12 PROCEDURE — 82948 REAGENT STRIP/BLOOD GLUCOSE: CPT

## 2023-08-12 PROCEDURE — 80048 BASIC METABOLIC PNL TOTAL CA: CPT

## 2023-08-12 PROCEDURE — 99232 SBSQ HOSP IP/OBS MODERATE 35: CPT

## 2023-08-12 PROCEDURE — 85027 COMPLETE CBC AUTOMATED: CPT

## 2023-08-12 PROCEDURE — 83735 ASSAY OF MAGNESIUM: CPT

## 2023-08-12 RX ADMIN — GABAPENTIN 100 MG: 100 CAPSULE ORAL at 08:27

## 2023-08-12 RX ADMIN — CEFAZOLIN SODIUM 2000 MG: 2 SOLUTION INTRAVENOUS at 13:49

## 2023-08-12 RX ADMIN — ATORVASTATIN CALCIUM 40 MG: 40 TABLET, FILM COATED ORAL at 17:28

## 2023-08-12 RX ADMIN — FUROSEMIDE 20 MG: 20 TABLET ORAL at 17:28

## 2023-08-12 RX ADMIN — METOPROLOL TARTRATE 100 MG: 100 TABLET, FILM COATED ORAL at 21:31

## 2023-08-12 RX ADMIN — INSULIN LISPRO 1 UNITS: 100 INJECTION, SOLUTION INTRAVENOUS; SUBCUTANEOUS at 21:31

## 2023-08-12 RX ADMIN — BUDESONIDE AND FORMOTEROL FUMARATE DIHYDRATE 1 PUFF: 160; 4.5 AEROSOL RESPIRATORY (INHALATION) at 08:28

## 2023-08-12 RX ADMIN — CEFAZOLIN SODIUM 2000 MG: 2 SOLUTION INTRAVENOUS at 23:36

## 2023-08-12 RX ADMIN — APIXABAN 10 MG: 5 TABLET, FILM COATED ORAL at 17:29

## 2023-08-12 RX ADMIN — DOCUSATE SODIUM 100 MG: 100 CAPSULE, LIQUID FILLED ORAL at 08:27

## 2023-08-12 RX ADMIN — HYDRALAZINE HYDROCHLORIDE 10 MG: 10 TABLET, FILM COATED ORAL at 21:31

## 2023-08-12 RX ADMIN — LOSARTAN POTASSIUM 100 MG: 50 TABLET, FILM COATED ORAL at 08:27

## 2023-08-12 RX ADMIN — CEFAZOLIN SODIUM 2000 MG: 2 SOLUTION INTRAVENOUS at 06:05

## 2023-08-12 RX ADMIN — DOCUSATE SODIUM 100 MG: 100 CAPSULE, LIQUID FILLED ORAL at 17:28

## 2023-08-12 RX ADMIN — FUROSEMIDE 20 MG: 20 TABLET ORAL at 08:27

## 2023-08-12 RX ADMIN — APIXABAN 10 MG: 5 TABLET, FILM COATED ORAL at 08:27

## 2023-08-12 RX ADMIN — HYDRALAZINE HYDROCHLORIDE 10 MG: 10 TABLET, FILM COATED ORAL at 08:27

## 2023-08-12 RX ADMIN — BUDESONIDE AND FORMOTEROL FUMARATE DIHYDRATE 1 PUFF: 160; 4.5 AEROSOL RESPIRATORY (INHALATION) at 17:29

## 2023-08-12 RX ADMIN — INSULIN LISPRO 7 UNITS: 100 INJECTION, SOLUTION INTRAVENOUS; SUBCUTANEOUS at 13:48

## 2023-08-12 RX ADMIN — INSULIN GLARGINE 25 UNITS: 100 INJECTION, SOLUTION SUBCUTANEOUS at 21:31

## 2023-08-12 RX ADMIN — ASPIRIN 81 MG: 81 TABLET, COATED ORAL at 08:27

## 2023-08-12 RX ADMIN — AMLODIPINE BESYLATE 5 MG: 5 TABLET ORAL at 08:27

## 2023-08-12 RX ADMIN — TOPIRAMATE 100 MG: 25 TABLET, FILM COATED ORAL at 21:31

## 2023-08-12 RX ADMIN — INSULIN GLARGINE 25 UNITS: 100 INJECTION, SOLUTION SUBCUTANEOUS at 08:26

## 2023-08-12 RX ADMIN — INSULIN LISPRO 7 UNITS: 100 INJECTION, SOLUTION INTRAVENOUS; SUBCUTANEOUS at 08:26

## 2023-08-12 RX ADMIN — PREDNISONE 4 MG: 1 TABLET ORAL at 08:27

## 2023-08-12 RX ADMIN — GABAPENTIN 100 MG: 100 CAPSULE ORAL at 17:28

## 2023-08-12 RX ADMIN — METOPROLOL TARTRATE 100 MG: 100 TABLET, FILM COATED ORAL at 08:27

## 2023-08-12 RX ADMIN — HYDRALAZINE HYDROCHLORIDE 10 MG: 10 TABLET, FILM COATED ORAL at 17:31

## 2023-08-12 RX ADMIN — INSULIN LISPRO 7 UNITS: 100 INJECTION, SOLUTION INTRAVENOUS; SUBCUTANEOUS at 17:29

## 2023-08-12 RX ADMIN — Medication 2000 UNITS: at 08:27

## 2023-08-12 RX ADMIN — INSULIN LISPRO 2 UNITS: 100 INJECTION, SOLUTION INTRAVENOUS; SUBCUTANEOUS at 17:28

## 2023-08-12 NOTE — PLAN OF CARE
Problem: Potential for Falls  Goal: Patient will remain free of falls  Description: INTERVENTIONS:  - Educate patient/family on patient safety including physical limitations  - Instruct patient to call for assistance with activity   - Consult OT/PT to assist with strengthening/mobility   - Keep Call bell within reach  - Keep bed low and locked with side rails adjusted as appropriate  - Keep care items and personal belongings within reach  - Initiate and maintain comfort rounds  - Make Fall Risk Sign visible to staff  - Offer Toileting every  Hours, in advance of need  - Initiate/Maintain alarm  - Obtain necessary fall risk management equipment:   - Apply yellow socks and bracelet for high fall risk patients  - Consider moving patient to room near nurses station  Outcome: Progressing     Problem: MOBILITY - ADULT  Goal: Maintain or return to baseline ADL function  Description: INTERVENTIONS:  -  Assess patient's ability to carry out ADLs; assess patient's baseline for ADL function and identify physical deficits which impact ability to perform ADLs (bathing, care of mouth/teeth, toileting, grooming, dressing, etc.)  - Assess/evaluate cause of self-care deficits   - Assess range of motion  - Assess patient's mobility; develop plan if impaired  - Assess patient's need for assistive devices and provide as appropriate  - Encourage maximum independence but intervene and supervise when necessary  - Involve family in performance of ADLs  - Assess for home care needs following discharge   - Consider OT consult to assist with ADL evaluation and planning for discharge  - Provide patient education as appropriate  Outcome: Progressing  Goal: Maintains/Returns to pre admission functional level  Description: INTERVENTIONS:  - Perform BMAT or MOVE assessment daily.   - Set and communicate daily mobility goal to care team and patient/family/caregiver.    - Collaborate with rehabilitation services on mobility goals if consulted  - Perform Range of Motion  times a day. - Reposition patient every  hours.   - Dangle patient  times a day  - Stand patient  times a day  - Ambulate patient  times a day  - Out of bed to chair  times a day   - Out of bed for meals  times a day  - Out of bed for toileting  - Record patient progress and toleration of activity level   Outcome: Progressing     Problem: PAIN - ADULT  Goal: Verbalizes/displays adequate comfort level or baseline comfort level  Description: Interventions:  - Encourage patient to monitor pain and request assistance  - Assess pain using appropriate pain scale  - Administer analgesics based on type and severity of pain and evaluate response  - Implement non-pharmacological measures as appropriate and evaluate response  - Consider cultural and social influences on pain and pain management  - Notify physician/advanced practitioner if interventions unsuccessful or patient reports new pain  Outcome: Progressing     Problem: INFECTION - ADULT  Goal: Absence or prevention of progression during hospitalization  Description: INTERVENTIONS:  - Assess and monitor for signs and symptoms of infection  - Monitor lab/diagnostic results  - Monitor all insertion sites, i.e. indwelling lines, tubes, and drains  - Monitor endotracheal if appropriate and nasal secretions for changes in amount and color  - [place] appropriate cooling/warming therapies per order  - Administer medications as ordered  - Instruct and encourage patient and family to use good hand hygiene technique  - Identify and instruct in appropriate isolation precautions for identified infection/condition  Outcome: Progressing  Goal: Absence of fever/infection during neutropenic period  Description: INTERVENTIONS:  - Monitor WBC     Outcome: Progressing     Problem: SAFETY ADULT  Goal: Patient will remain free of falls  Description: INTERVENTIONS:  - Educate patient/family on patient safety including physical limitations  - Instruct patient to call for assistance with activity   - Consult OT/PT to assist with strengthening/mobility   - Keep Call bell within reach  - Keep bed low and locked with side rails adjusted as appropriate  - Keep care items and personal belongings within reach  - Initiate and maintain comfort rounds  - Make Fall Risk Sign visible to staff  - Offer Toileting every  Hours, in advance of need  - Initiate/Maintain alarm  - Obtain necessary fall risk management equipment:   - Apply yellow socks and bracelet for high fall risk patients  - Consider moving patient to room near nurses station  Outcome: Progressing  Goal: Maintain or return to baseline ADL function  Description: INTERVENTIONS:  -  Assess patient's ability to carry out ADLs; assess patient's baseline for ADL function and identify physical deficits which impact ability to perform ADLs (bathing, care of mouth/teeth, toileting, grooming, dressing, etc.)  - Assess/evaluate cause of self-care deficits   - Assess range of motion  - Assess patient's mobility; develop plan if impaired  - Assess patient's need for assistive devices and provide as appropriate  - Encourage maximum independence but intervene and supervise when necessary  - Involve family in performance of ADLs  - Assess for home care needs following discharge   - Consider OT consult to assist with ADL evaluation and planning for discharge  - Provide patient education as appropriate  Outcome: Progressing  Goal: Maintains/Returns to pre admission functional level  Description: INTERVENTIONS:  - Perform BMAT or MOVE assessment daily.   - Set and communicate daily mobility goal to care team and patient/family/caregiver. - Collaborate with rehabilitation services on mobility goals if consulted  - Perform Range of Motion  times a day. - Reposition patient every  hours.   - Dangle patient  times a day  - Stand patient  times a day  - Ambulate patient  times a day  - Out of bed to chair  times a day   - Out of bed for meals 3x times a day  - Out of bed for toileting  - Record patient progress and toleration of activity level   Outcome: Progressing     Problem: DISCHARGE PLANNING  Goal: Discharge to home or other facility with appropriate resources  Description: INTERVENTIONS:  - Identify barriers to discharge w/patient and caregiver  - Arrange for needed discharge resources and transportation as appropriate  - Identify discharge learning needs (meds, wound care, etc.)  - Arrange for interpretive services to assist at discharge as needed  - Refer to Case Management Department for coordinating discharge planning if the patient needs post-hospital services based on physician/advanced practitioner order or complex needs related to functional status, cognitive ability, or social support system  Outcome: Progressing     Problem: Knowledge Deficit  Goal: Patient/family/caregiver demonstrates understanding of disease process, treatment plan, medications, and discharge instructions  Description: Complete learning assessment and assess knowledge base.   Interventions:  - Provide teaching at level of understanding  - Provide teaching via preferred learning methods  Outcome: Progressing     Problem: Prexisting or High Potential for Compromised Skin Integrity  Goal: Skin integrity is maintained or improved  Description: INTERVENTIONS:  - Identify patients at risk for skin breakdown  - Assess and monitor skin integrity  - Assess and monitor nutrition and hydration status  - Monitor labs   - Assess for incontinence   - Turn and reposition patient  - Assist with mobility/ambulation  - Relieve pressure over bony prominences  - Avoid friction and shearing  - Provide appropriate hygiene as needed including keeping skin clean and dry  - Evaluate need for skin moisturizer/barrier cream  - Collaborate with interdisciplinary team   - Patient/family teaching  - Consider wound care consult   Outcome: Progressing

## 2023-08-12 NOTE — PLAN OF CARE
Problem: Potential for Falls  Goal: Patient will remain free of falls  Description: INTERVENTIONS:  - Educate patient/family on patient safety including physical limitations  - Instruct patient to call for assistance with activity   - Consult OT/PT to assist with strengthening/mobility   - Keep Call bell within reach  - Keep bed low and locked with side rails adjusted as appropriate  - Keep care items and personal belongings within reach  - Initiate and maintain comfort rounds  - Make Fall Risk Sign visible to staff  - Offer Toileting every 2 Hours, in advance of need  - Initiate/Maintain bed alarm  - Obtain necessary fall risk management equipment: bed alarm  - Apply yellow socks and bracelet for high fall risk patients  - Consider moving patient to room near nurses station  Outcome: Progressing     Problem: MOBILITY - ADULT  Goal: Maintain or return to baseline ADL function  Description: INTERVENTIONS:  -  Assess patient's ability to carry out ADLs; assess patient's baseline for ADL function and identify physical deficits which impact ability to perform ADLs (bathing, care of mouth/teeth, toileting, grooming, dressing, etc.)  - Assess/evaluate cause of self-care deficits   - Assess range of motion  - Assess patient's mobility; develop plan if impaired  - Assess patient's need for assistive devices and provide as appropriate  - Encourage maximum independence but intervene and supervise when necessary  - Involve family in performance of ADLs  - Assess for home care needs following discharge   - Consider OT consult to assist with ADL evaluation and planning for discharge  - Provide patient education as appropriate  Outcome: Progressing  Goal: Maintains/Returns to pre admission functional level  Description: INTERVENTIONS:  - Perform BMAT or MOVE assessment daily.   - Set and communicate daily mobility goal to care team and patient/family/caregiver.    - Collaborate with rehabilitation services on mobility goals if consulted  - Perform Range of Motion 4 times a day. - Reposition patient every 2 hours.   - Dangle patient 3 times a day  - Stand patient 3 times a day  - Ambulate patient 0 times a day  - Out of bed to chair 3 times a day   - Out of bed for meals 3 times a day  - Out of bed for toileting  - Record patient progress and toleration of activity level   Outcome: Progressing

## 2023-08-12 NOTE — PROGRESS NOTES
[address] [facility]  Progress Note  Name: Nelsy Young  MRN: [id number]  Unit/Bed#: [facility] 2 [address] [facility] 222-02 I Date of Admission: 8/8/2023   Date of Service: 8/12/2023 I Hospital Day: 4    Assessment/Plan   * Pulmonary embolism [facility])  Assessment & Plan  · Patient has acute pulmonary embolism and lower extremity DVT  · Outpatient venous duplex showed acute non occlusive DVT in the left popliteal vein and in the posterior tibial veins  · ECHO stable from previous  · Continue on eliquis 10 mg BID 3/7    Diabetic foot ulcer [facility])  Assessment & Plan  Lab Results   Component Value Date    HGBA1C 10.2 (A) 07/21/2023       Recent Labs     08/11/23  1115 08/11/23  1609 08/11/23 2120 08/12/23  0754   POCGLU 218* 304* 225* 118       Blood Sugar Average: Last 72 hrs:  (P) 162.0168668025062094   · large right plantar surface diabetic foot ulcer almost the entire length of her foot no exposed bone noted   · Continue on ancef day 5   · Blood cultures negative at 72 hours, follow would cultures results in gram positive rods   · X rays bilateral feet with no evidence of osteomyelitis   · Podiatry consulted no need for surgical intervention    PMR (polymyalgia rheumatica) ([facility])  Assessment & Plan  · On chronic prednisone    Uncontrolled type 2 diabetes mellitus with hyperglycemia [facility])  Assessment & Plan  Lab Results   Component Value Date    HGBA1C 10.2 (A) 07/21/2023       Recent Labs     08/11/23  1115 08/11/23  1609 08/11/23 2120 08/12/23  0754   POCGLU 218* 304* 225* 118       Blood Sugar Average: Last 72 hrs:  (P) 931.0410599767353531     · Continue Lantus 25 units and increased to 7 units 3  times daily Humalog.   Add Humalog insulin sliding scale    QT prolongation  Assessment & Plan  · Prolonged Qtc improved to 484 on EKG yesterday  · Continue repleting electrolytes as needed         VTE Pharmacologic Prophylaxis: VTE Score: 3 Moderate Risk (Score 3-4) - Pharmacological DVT Prophylaxis Ordered: apixaban (Eliquis). Patient Centered Rounds: I performed bedside rounds with nursing staff today. Discussions with Specialists or Other Care Team Provider: none    Education and Discussions with Family / Patient: Updated  (son) at bedside. Total Time Spent on Date of Encounter in care of patient: 45 minutes This time was spent on one or more of the following: performing physical exam; counseling and coordination of care; obtaining or reviewing history; documenting in the medical record; reviewing/ordering tests, medications or procedures; communicating with other healthcare professionals and discussing with patient's family/caregivers. Current Length of Stay: 4 day(s)  Current Patient Status: Inpatient   Certification Statement: The patient will continue to require additional inpatient hospital stay due to awaiting rehab  Discharge Plan: Anticipate discharge in 24-48 hrs to rehab facility. Code Status: Level 1 - Full Code    Subjective:   Patient was seen laying in bed. She denies any acute complaints at this time. She reports feeling well    Objective:     Vitals:   Temp (24hrs), Av.7 °F (37.1 °C), Min:98.5 °F (36.9 °C), Max:98.8 °F (37.1 °C)    Temp:  [98.5 °F (36.9 °C)-98.8 °F (37.1 °C)] 98.8 °F (37.1 °C)  HR:  [62-68] 62  Resp:  [16-18] 16  BP: (117-146)/(63-72) 146/72  SpO2:  [94 %-96 %] 94 %  Body mass index is 41.97 kg/m². Input and Output Summary (last 24 hours): Intake/Output Summary (Last 24 hours) at 2023 0953  Last data filed at 2023 0602  Gross per 24 hour   Intake 480 ml   Output 1000 ml   Net -520 ml       Physical Exam:   Physical Exam  Vitals and nursing note reviewed. Constitutional:       Appearance: Normal appearance. HENT:      Head: Normocephalic and atraumatic. Eyes:      General: No scleral icterus. Cardiovascular:      Rate and Rhythm: Normal rate. Pulmonary:      Effort: Pulmonary effort is normal.      Breath sounds: Normal breath sounds. Abdominal:      General: Abdomen is flat. Bowel sounds are normal.      Palpations: Abdomen is soft. Musculoskeletal:      Right lower leg: Edema present. Left lower leg: Edema present. Skin:     General: Skin is warm and dry. Comments: Bilateral feet in guaze dressings   Neurological:      Mental Status: She is alert. Mental status is at baseline. Psychiatric:         Mood and Affect: Mood normal.         Behavior: Behavior normal.        Additional Data:     Labs:  Results from last 7 days   Lab Units 08/12/23  0448 08/11/23  0454 08/10/23  0512   WBC Thousand/uL 13.09*   < > 11.62*   HEMOGLOBIN g/dL 9.2*   < > 8.6*   HEMATOCRIT % 30.8*   < > 28.4*   PLATELETS Thousands/uL 462*   < > 413*   NEUTROS PCT %  --   --  62   LYMPHS PCT %  --   --  25   MONOS PCT %  --   --  8   EOS PCT %  --   --  3    < > = values in this interval not displayed. Results from last 7 days   Lab Units 08/12/23  0448 08/09/23  0628 08/08/23  1302   SODIUM mmol/L 139   < > 136   POTASSIUM mmol/L 3.9   < > 3.5   CHLORIDE mmol/L 107   < > 101   CO2 mmol/L 26   < > 27   BUN mg/dL 17   < > 19   CREATININE mg/dL 0.85   < > 0.85   ANION GAP mmol/L 6   < > 8   CALCIUM mg/dL 8.7   < > 9.3   ALBUMIN g/dL  --   --  3.4*   TOTAL BILIRUBIN mg/dL  --   --  0.79   ALK PHOS U/L  --   --  132*   ALT U/L  --   --  7   AST U/L  --   --  10*   GLUCOSE RANDOM mg/dL 150*   < > 260*    < > = values in this interval not displayed.      Results from last 7 days   Lab Units 08/08/23  2251   INR  1.13     Results from last 7 days   Lab Units 08/12/23  0754 08/11/23  2120 08/11/23  1609 08/11/23  1115 08/11/23  0737 08/10/23  2126 08/10/23  1626 08/10/23  1103 08/10/23  0714 08/09/23  2108 08/09/23  1703 08/09/23  1114   POC GLUCOSE mg/dl 118 225* 304* 218* 185* 289* 291* 182* 210* 256* 207* 107         Results from last 7 days   Lab Units 08/08/23  1533   LACTIC ACID mmol/L 1.8       Lines/Drains:  Invasive Devices     Peripheral Intravenous Line  Duration           Peripheral IV 08/12/23 Distal;Left;Upper;Ventral (anterior) Arm <1 day          Drain  Duration           External Urinary Catheter 3 days                      Imaging: No pertinent imaging reviewed. Recent Cultures (last 7 days):   Results from last 7 days   Lab Units 08/09/23  0945 08/08/23  2251   BLOOD CULTURE   --  No Growth at 72 hrs. No Growth at 72 hrs.    GRAM STAIN RESULT  3+ Polys*  2+ Gram positive rods*  --    WOUND CULTURE  4+ Growth of  --        Last 24 Hours Medication List:   Current Facility-Administered Medications   Medication Dose Route Frequency Provider Last Rate   • acetaminophen  650 mg Oral Q6H PRN Danielle Mink Prechtel, DO     • amLODIPine  5 mg Oral Daily Mic S Prechtel, DO     • apixaban  10 mg Oral BID Eliane Mederos PA-C     • aspirin  81 mg Oral Daily Mic S Prechtel, DO     • atorvastatin  40 mg Oral Daily With [employer] S Prechtel, DO     • budesonide-formoterol  1 puff Inhalation BID Danielle Minsurjit Prechtel, DO     • cefazolin  2,000 mg Intravenous Q8H Milton rPyor PA-C 2,000 mg (08/12/23 2977)   • cholecalciferol  2,000 Units Oral Daily Mic S Prechtel, DO     • docusate sodium  100 mg Oral BID Mic S Prechtel, DO     • furosemide  20 mg Oral BID Mic S Prechtel, DO     • gabapentin  100 mg Oral BID Mic S Prechtel, DO     • hydrALAZINE  10 mg Oral TID Danielle Minsurjit Prechtel, DO     • insulin glargine  25 Units Subcutaneous Q12H [address] Mic S Prechtel, DO     • insulin lispro  1-5 Units Subcutaneous HS Mic S Prechtel, DO     • insulin lispro  1-6 Units Subcutaneous TID AC Mic S Prechtel, DO     • insulin lispro  7 Units Subcutaneous TID With [employer]als Deja Jacobs PA-C     • loperamide  2 mg Oral 4x Daily PRN Danielle Mink Prechtel, DO     • losartan  100 mg Oral Daily Mic S Prechtel, DO     • menthol-methyl salicylate   Apply externally 4x Daily PRN Milton Pryor PA-C     • metoprolol tartrate  100 mg Oral Q12H [address] Ynes Perez, DO     • ondansetron  4 mg Intravenous Q6H PRN Ynes Perez, DO     • predniSONE  4 mg Oral Daily Mic Perez DO     • topiramate  100 mg Oral HS Kristopher Akins,           Today, Patient Was Seen By: Brock Heredia PA-C    **Please Note: This note may have been constructed using a voice recognition system. **

## 2023-08-12 NOTE — ASSESSMENT & PLAN NOTE
· Patient has acute pulmonary embolism and lower extremity DVT  · Outpatient venous duplex showed acute non occlusive DVT in the left popliteal vein and in the posterior tibial veins  · ECHO stable from previous  · Continue on eliquis 10 mg BID 3/7

## 2023-08-12 NOTE — ASSESSMENT & PLAN NOTE
Lab Results   Component Value Date    HGBA1C 10.2 (A) 07/21/2023       Recent Labs     08/11/23  1115 08/11/23  1609 08/11/23  2120 08/12/23  0754   POCGLU 218* 304* 225* 118       Blood Sugar Average: Last 72 hrs:  (P) 833.3448116517052247     · Continue Lantus 25 units and increased to 7 units 3  times daily Humalog.   Add Humalog insulin sliding scale

## 2023-08-12 NOTE — ASSESSMENT & PLAN NOTE
Lab Results   Component Value Date    HGBA1C 10.2 (A) 07/21/2023       Recent Labs     08/11/23  1115 08/11/23  1609 08/11/23  2120 08/12/23  0754   POCGLU 218* 304* 225* 118       Blood Sugar Average: Last 72 hrs:  (P) 339.7135801131147413   · large right plantar surface diabetic foot ulcer almost the entire length of her foot no exposed bone noted   · Continue on ancef day 5   · Blood cultures negative at 72 hours, follow would cultures results in gram positive rods   · X rays bilateral feet with no evidence of osteomyelitis   · Podiatry consulted no need for surgical intervention

## 2023-08-13 LAB
ANION GAP SERPL CALCULATED.3IONS-SCNC: 4 MMOL/L
BUN SERPL-MCNC: 21 MG/DL (ref 5–25)
CALCIUM SERPL-MCNC: 8.5 MG/DL (ref 8.4–10.2)
CHLORIDE SERPL-SCNC: 108 MMOL/L (ref 96–108)
CO2 SERPL-SCNC: 27 MMOL/L (ref 21–32)
CREAT SERPL-MCNC: 0.94 MG/DL (ref 0.6–1.3)
ERYTHROCYTE [DISTWIDTH] IN BLOOD BY AUTOMATED COUNT: 16.1 % (ref 11.6–15.1)
GFR SERPL CREATININE-BSD FRML MDRD: 64 ML/MIN/1.73SQ M
GLUCOSE SERPL-MCNC: 142 MG/DL (ref 65–140)
GLUCOSE SERPL-MCNC: 152 MG/DL (ref 65–140)
GLUCOSE SERPL-MCNC: 159 MG/DL (ref 65–140)
GLUCOSE SERPL-MCNC: 164 MG/DL (ref 65–140)
GLUCOSE SERPL-MCNC: 176 MG/DL (ref 65–140)
HCT VFR BLD AUTO: 30.5 % (ref 34.8–46.1)
HGB BLD-MCNC: 9 G/DL (ref 11.5–15.4)
MAGNESIUM SERPL-MCNC: 2 MG/DL (ref 1.9–2.7)
MCH RBC QN AUTO: 23.9 PG (ref 26.8–34.3)
MCHC RBC AUTO-ENTMCNC: 29.5 G/DL (ref 31.4–37.4)
MCV RBC AUTO: 81 FL (ref 82–98)
PLATELET # BLD AUTO: 467 THOUSANDS/UL (ref 149–390)
PMV BLD AUTO: 10.9 FL (ref 8.9–12.7)
POTASSIUM SERPL-SCNC: 4.1 MMOL/L (ref 3.5–5.3)
RBC # BLD AUTO: 3.76 MILLION/UL (ref 3.81–5.12)
SODIUM SERPL-SCNC: 139 MMOL/L (ref 135–147)
WBC # BLD AUTO: 14.52 THOUSAND/UL (ref 4.31–10.16)

## 2023-08-13 PROCEDURE — 82948 REAGENT STRIP/BLOOD GLUCOSE: CPT

## 2023-08-13 PROCEDURE — 83735 ASSAY OF MAGNESIUM: CPT

## 2023-08-13 PROCEDURE — 80048 BASIC METABOLIC PNL TOTAL CA: CPT

## 2023-08-13 PROCEDURE — 99232 SBSQ HOSP IP/OBS MODERATE 35: CPT

## 2023-08-13 PROCEDURE — 85027 COMPLETE CBC AUTOMATED: CPT

## 2023-08-13 RX ADMIN — TOPIRAMATE 100 MG: 25 TABLET, FILM COATED ORAL at 21:50

## 2023-08-13 RX ADMIN — FUROSEMIDE 20 MG: 20 TABLET ORAL at 08:01

## 2023-08-13 RX ADMIN — METOPROLOL TARTRATE 100 MG: 100 TABLET, FILM COATED ORAL at 21:50

## 2023-08-13 RX ADMIN — INSULIN GLARGINE 25 UNITS: 100 INJECTION, SOLUTION SUBCUTANEOUS at 21:50

## 2023-08-13 RX ADMIN — ASPIRIN 81 MG: 81 TABLET, COATED ORAL at 08:01

## 2023-08-13 RX ADMIN — Medication 2000 UNITS: at 08:01

## 2023-08-13 RX ADMIN — INSULIN LISPRO 7 UNITS: 100 INJECTION, SOLUTION INTRAVENOUS; SUBCUTANEOUS at 18:01

## 2023-08-13 RX ADMIN — APIXABAN 10 MG: 5 TABLET, FILM COATED ORAL at 18:02

## 2023-08-13 RX ADMIN — CEFAZOLIN SODIUM 2000 MG: 2 SOLUTION INTRAVENOUS at 15:39

## 2023-08-13 RX ADMIN — INSULIN LISPRO 7 UNITS: 100 INJECTION, SOLUTION INTRAVENOUS; SUBCUTANEOUS at 12:38

## 2023-08-13 RX ADMIN — DOCUSATE SODIUM 100 MG: 100 CAPSULE, LIQUID FILLED ORAL at 08:01

## 2023-08-13 RX ADMIN — HYDRALAZINE HYDROCHLORIDE 10 MG: 10 TABLET, FILM COATED ORAL at 08:01

## 2023-08-13 RX ADMIN — FUROSEMIDE 20 MG: 20 TABLET ORAL at 18:03

## 2023-08-13 RX ADMIN — ATORVASTATIN CALCIUM 40 MG: 40 TABLET, FILM COATED ORAL at 18:02

## 2023-08-13 RX ADMIN — LOSARTAN POTASSIUM 100 MG: 50 TABLET, FILM COATED ORAL at 08:01

## 2023-08-13 RX ADMIN — INSULIN LISPRO 7 UNITS: 100 INJECTION, SOLUTION INTRAVENOUS; SUBCUTANEOUS at 08:00

## 2023-08-13 RX ADMIN — DOCUSATE SODIUM 100 MG: 100 CAPSULE, LIQUID FILLED ORAL at 18:02

## 2023-08-13 RX ADMIN — GABAPENTIN 100 MG: 100 CAPSULE ORAL at 08:01

## 2023-08-13 RX ADMIN — APIXABAN 10 MG: 5 TABLET, FILM COATED ORAL at 08:01

## 2023-08-13 RX ADMIN — BUDESONIDE AND FORMOTEROL FUMARATE DIHYDRATE 1 PUFF: 160; 4.5 AEROSOL RESPIRATORY (INHALATION) at 08:00

## 2023-08-13 RX ADMIN — CEFAZOLIN SODIUM 2000 MG: 2 SOLUTION INTRAVENOUS at 23:08

## 2023-08-13 RX ADMIN — PREDNISONE 4 MG: 1 TABLET ORAL at 08:00

## 2023-08-13 RX ADMIN — CEFAZOLIN SODIUM 2000 MG: 2 SOLUTION INTRAVENOUS at 06:31

## 2023-08-13 RX ADMIN — AMLODIPINE BESYLATE 5 MG: 5 TABLET ORAL at 08:01

## 2023-08-13 RX ADMIN — HYDRALAZINE HYDROCHLORIDE 10 MG: 10 TABLET, FILM COATED ORAL at 15:39

## 2023-08-13 RX ADMIN — HYDRALAZINE HYDROCHLORIDE 10 MG: 10 TABLET, FILM COATED ORAL at 21:50

## 2023-08-13 RX ADMIN — GABAPENTIN 100 MG: 100 CAPSULE ORAL at 18:02

## 2023-08-13 RX ADMIN — INSULIN LISPRO 1 UNITS: 100 INJECTION, SOLUTION INTRAVENOUS; SUBCUTANEOUS at 08:00

## 2023-08-13 RX ADMIN — BUDESONIDE AND FORMOTEROL FUMARATE DIHYDRATE 1 PUFF: 160; 4.5 AEROSOL RESPIRATORY (INHALATION) at 18:03

## 2023-08-13 RX ADMIN — METOPROLOL TARTRATE 100 MG: 100 TABLET, FILM COATED ORAL at 08:01

## 2023-08-13 RX ADMIN — INSULIN GLARGINE 25 UNITS: 100 INJECTION, SOLUTION SUBCUTANEOUS at 08:00

## 2023-08-13 NOTE — PROGRESS NOTES
[address] [facility]  Progress Note  Name: Satinder Vogel  MRN: [id number]  Unit/Bed#: [facility] 2 [address] [facility] 222-02 I Date of Admission: 8/8/2023   Date of Service: 8/13/2023 I Hospital Day: 5    Assessment/Plan   * Pulmonary embolism [facility])  Assessment & Plan  · Patient has acute pulmonary embolism and lower extremity DVT  · Outpatient venous duplex showed acute non occlusive DVT in the left popliteal vein and in the posterior tibial veins  · ECHO stable from previous  · Continue on eliquis 10 mg BID 4/7    Diabetic foot ulcer ([address])  Assessment & Plan  Lab Results   Component Value Date    HGBA1C 10.2 (A) 07/21/2023       Recent Labs     08/12/23  1121 08/12/23  1606 08/12/23  2102 08/13/23  0719   POCGLU 102 207* 152* 159*       Blood Sugar Average: Last 72 hrs:  (P) 203.5321661177112551   · large right plantar surface diabetic foot ulcer almost the entire length of her foot no exposed bone noted   · Continue on ancef day 6  · Blood cultures negative at 4 days , follow would culture showing gram positive rods   · X rays bilateral feet with no evidence of osteomyelitis   · Podiatry consulted no need for surgical intervention    Uncontrolled type 2 diabetes mellitus with hyperglycemia [facility])  Assessment & Plan  Lab Results   Component Value Date    HGBA1C 10.2 (A) 07/21/2023       Recent Labs     08/12/23  1121 08/12/23  1606 08/12/23  2102 08/13/23  0719   POCGLU 102 207* 152* 159*       Blood Sugar Average: Last 72 hrs:  (P) 203.0070674202709465     · Continue Lantus 25 units and  7 units 3  times daily Humalog. Add Humalog insulin sliding scale    QT prolongation  Assessment & Plan  · Prolonged Qtc improved to 484 on EKG   · Continue repleting electrolytes as needed           VTE Pharmacologic Prophylaxis: VTE Score: 3 Moderate Risk (Score 3-4) - Pharmacological DVT Prophylaxis Ordered: apixaban (Eliquis). Patient Centered Rounds: I performed bedside rounds with nursing staff today.   Discussions with Specialists or Other Care Team Provider: MAI    Education and Discussions with Family / Patient: Updated  (son) at bedside. Total Time Spent on Date of Encounter in care of patient: 45 minutes This time was spent on one or more of the following: performing physical exam; counseling and coordination of care; obtaining or reviewing history; documenting in the medical record; reviewing/ordering tests, medications or procedures; communicating with other healthcare professionals and discussing with patient's family/caregivers. Current Length of Stay: 5 day(s)  Current Patient Status: Inpatient   Certification Statement: The patient will continue to require additional inpatient hospital stay due to pending placement in rehab  Discharge Plan: Anticipate discharge in 24-48 hrs to rehab facility. Code Status: Level 1 - Full Code    Subjective:   Patient was seen laying in bed today. She reports feeling well. She denies any acute complaints she reports feeling like she has more mobility in her legs     Objective:     Vitals:   Temp (24hrs), Av.8 °F (37.1 °C), Min:98.3 °F (36.8 °C), Max:99.3 °F (37.4 °C)    Temp:  [98.3 °F (36.8 °C)-99.3 °F (37.4 °C)] 98.3 °F (36.8 °C)  HR:  [59-64] 59  Resp:  [18] 18  BP: (128-130)/(63-68) 130/68  SpO2:  [96 %-98 %] 98 %  Body mass index is 41.97 kg/m². Input and Output Summary (last 24 hours): Intake/Output Summary (Last 24 hours) at 2023 1123  Last data filed at 2023 0631  Gross per 24 hour   Intake 240 ml   Output 1850 ml   Net -1610 ml       Physical Exam:   Physical Exam  Vitals and nursing note reviewed. Constitutional:       Appearance: Normal appearance. HENT:      Head: Normocephalic and atraumatic. Eyes:      General: No scleral icterus. Cardiovascular:      Rate and Rhythm: Normal rate and regular rhythm. Pulmonary:      Effort: Pulmonary effort is normal.      Breath sounds: Normal breath sounds.    Abdominal:      General: Abdomen is flat. Bowel sounds are normal.      Palpations: Abdomen is soft. Tenderness: There is no abdominal tenderness. Musculoskeletal:      Right lower leg: Edema present. Left lower leg: Edema present. Skin:     General: Skin is warm and dry. Comments: Bilateral feet in gauze dressings    Neurological:      Mental Status: She is alert. Mental status is at baseline. Psychiatric:         Mood and Affect: Mood normal.         Behavior: Behavior normal.        Additional Data:     Labs:  Results from last 7 days   Lab Units 08/13/23  0444 08/11/23  0454 08/10/23  0512   WBC Thousand/uL 14.52*   < > 11.62*   HEMOGLOBIN g/dL 9.0*   < > 8.6*   HEMATOCRIT % 30.5*   < > 28.4*   PLATELETS Thousands/uL 467*   < > 413*   NEUTROS PCT %  --   --  62   LYMPHS PCT %  --   --  25   MONOS PCT %  --   --  8   EOS PCT %  --   --  3    < > = values in this interval not displayed. Results from last 7 days   Lab Units 08/13/23  0444 08/09/23  0628 08/08/23  1302   SODIUM mmol/L 139   < > 136   POTASSIUM mmol/L 4.1   < > 3.5   CHLORIDE mmol/L 108   < > 101   CO2 mmol/L 27   < > 27   BUN mg/dL 21   < > 19   CREATININE mg/dL 0.94   < > 0.85   ANION GAP mmol/L 4   < > 8   CALCIUM mg/dL 8.5   < > 9.3   ALBUMIN g/dL  --   --  3.4*   TOTAL BILIRUBIN mg/dL  --   --  0.79   ALK PHOS U/L  --   --  132*   ALT U/L  --   --  7   AST U/L  --   --  10*   GLUCOSE RANDOM mg/dL 164*   < > 260*    < > = values in this interval not displayed.      Results from last 7 days   Lab Units 08/08/23  2251   INR  1.13     Results from last 7 days   Lab Units 08/13/23  0719 08/12/23  2102 08/12/23  1606 08/12/23  1121 08/12/23  0754 08/11/23  2120 08/11/23  1609 08/11/23  1115 08/11/23  0737 08/10/23  2126 08/10/23  1626 08/10/23  1103   POC GLUCOSE mg/dl 159* 152* 207* 102 118 225* 304* 218* 185* 289* 291* 182*         Results from last 7 days   Lab Units 08/08/23  1533   LACTIC ACID mmol/L 1.8       Lines/Drains:  Invasive Devices Peripheral Intravenous Line  Duration           Peripheral IV 08/12/23 Distal;Left;Upper;Ventral (anterior) Arm 1 day          Drain  Duration           External Urinary Catheter 4 days                      Imaging: No pertinent imaging reviewed. Recent Cultures (last 7 days):   Results from last 7 days   Lab Units 08/09/23  0945 08/08/23  2251   BLOOD CULTURE   --  No Growth After 4 Days. No Growth After 4 Days.    GRAM STAIN RESULT  3+ Polys*  2+ Gram positive rods*  --    WOUND CULTURE  4+ Growth of  --        Last 24 Hours Medication List:   Current Facility-Administered Medications   Medication Dose Route Frequency Provider Last Rate   • acetaminophen  650 mg Oral Q6H PRN Carlinelatrice Medina Prechtel, DO     • amLODIPine  5 mg Oral Daily Mic S Prechtel, DO     • apixaban  10 mg Oral BID Willow Vega PA-C     • aspirin  81 mg Oral Daily Mic S Prechtel, DO     • atorvastatin  40 mg Oral Daily With [employer] S Prechtel, DO     • budesonide-formoterol  1 puff Inhalation BID Carline Medina Prechtel, DO     • cefazolin  2,000 mg Intravenous Q8H J Luis Hylton PA-C 2,000 mg (08/13/23 0631)   • cholecalciferol  2,000 Units Oral Daily Mic S Prechtel, DO     • docusate sodium  100 mg Oral BID Mic S Prechtel, DO     • furosemide  20 mg Oral BID Mic S Prechtel, DO     • gabapentin  100 mg Oral BID Mic S Prechtel, DO     • hydrALAZINE  10 mg Oral TID Carline Medina Prechtel, DO     • insulin glargine  25 Units Subcutaneous Q12H [facility] Mic S Prechtel, DO     • insulin lispro  1-5 Units Subcutaneous HS Mic S Prechtel, DO     • insulin lispro  1-6 Units Subcutaneous TID AC Mic S Prechtel, DO     • insulin lispro  7 Units Subcutaneous TID With Meals Deja Feliz PA-C     • loperamide  2 mg Oral 4x Daily PRN Carline Medina Prechtel, DO     • losartan  100 mg Oral Daily Mic S Prechtel, DO     • menthol-methyl salicylate   Apply externally 4x Daily PRN J Luis Hylton PA-C     • metoprolol tartrate  100 mg Oral Q12H [address] Mci S Prechtel, DO     • ondansetron  4 mg Intravenous Q6H PRN Galindo Rod Prechtel, DO     • predniSONE  4 mg Oral Daily Mic S Prechtel, DO     • topiramate  100 mg Oral HS Nicole Alvarez,           Today, Patient Was Seen By: Muna Ch, PAMamadouC    **Please Note: This note may have been constructed using a voice recognition system. **

## 2023-08-13 NOTE — ASSESSMENT & PLAN NOTE
Lab Results   Component Value Date    HGBA1C 10.2 (A) 07/21/2023       Recent Labs     08/12/23  1121 08/12/23  1606 08/12/23  2102 08/13/23  0719   POCGLU 102 207* 152* 159*       Blood Sugar Average: Last 72 hrs:  (P) 203.2803469155983648     · Continue Lantus 25 units and  7 units 3  times daily Humalog.   Add Humalog insulin sliding scale

## 2023-08-13 NOTE — ASSESSMENT & PLAN NOTE
· Patient has acute pulmonary embolism and lower extremity DVT  · Outpatient venous duplex showed acute non occlusive DVT in the left popliteal vein and in the posterior tibial veins  · ECHO stable from previous  · Continue on eliquis 10 mg BID 4/7

## 2023-08-13 NOTE — PLAN OF CARE
Problem: Potential for Falls  Goal: Patient will remain free of falls  Description: INTERVENTIONS:  - Educate patient/family on patient safety including physical limitations  - Instruct patient to call for assistance with activity   - Consult OT/PT to assist with strengthening/mobility   - Keep Call bell within reach  - Keep bed low and locked with side rails adjusted as appropriate  - Keep care items and personal belongings within reach  - Initiate and maintain comfort rounds  - Make Fall Risk Sign visible to staff  - Offer Toileting every 2 Hours, in advance of need  - Initiate/Maintain bed alarm  - Obtain necessary fall risk management equipment:   - Apply yellow socks and bracelet for high fall risk patients  - Consider moving patient to room near nurses station  Outcome: Progressing     Problem: MOBILITY - ADULT  Goal: Maintain or return to baseline ADL function  Description: INTERVENTIONS:  -  Assess patient's ability to carry out ADLs; assess patient's baseline for ADL function and identify physical deficits which impact ability to perform ADLs (bathing, care of mouth/teeth, toileting, grooming, dressing, etc.)  - Assess/evaluate cause of self-care deficits   - Assess range of motion  - Assess patient's mobility; develop plan if impaired  - Assess patient's need for assistive devices and provide as appropriate  - Encourage maximum independence but intervene and supervise when necessary  - Involve family in performance of ADLs  - Assess for home care needs following discharge   - Consider OT consult to assist with ADL evaluation and planning for discharge  - Provide patient education as appropriate  Outcome: Progressing  Goal: Maintains/Returns to pre admission functional level  Description: INTERVENTIONS:  - Perform BMAT or MOVE assessment daily.   - Set and communicate daily mobility goal to care team and patient/family/caregiver.    - Collaborate with rehabilitation services on mobility goals if consulted  - Perform Range of Motion 3 times a day. - Reposition patient every 2 hours.   - Dangle patient 3 times a day  - Stand patient 3 times a day  - Ambulate patient 3 times a day  - Out of bed to chair 3 times a day   - Out of bed for meals 3 times a day  - Out of bed for toileting  - Record patient progress and toleration of activity level   Outcome: Progressing     Problem: PAIN - ADULT  Goal: Verbalizes/displays adequate comfort level or baseline comfort level  Description: Interventions:  - Encourage patient to monitor pain and request assistance  - Assess pain using appropriate pain scale  - Administer analgesics based on type and severity of pain and evaluate response  - Implement non-pharmacological measures as appropriate and evaluate response  - Consider cultural and social influences on pain and pain management  - Notify physician/advanced practitioner if interventions unsuccessful or patient reports new pain  Outcome: Progressing     Problem: INFECTION - ADULT  Goal: Absence or prevention of progression during hospitalization  Description: INTERVENTIONS:  - Assess and monitor for signs and symptoms of infection  - Monitor lab/diagnostic results  - Monitor all insertion sites, i.e. indwelling lines, tubes, and drains  - Monitor endotracheal if appropriate and nasal secretions for changes in amount and color  - [place] appropriate cooling/warming therapies per order  - Administer medications as ordered  - Instruct and encourage patient and family to use good hand hygiene technique  - Identify and instruct in appropriate isolation precautions for identified infection/condition  Outcome: Progressing  Goal: Absence of fever/infection during neutropenic period  Description: INTERVENTIONS:  - Monitor WBC    Outcome: Progressing     Problem: SAFETY ADULT  Goal: Patient will remain free of falls  Description: INTERVENTIONS:  - Educate patient/family on patient safety including physical limitations  - Instruct patient to call for assistance with activity   - Consult OT/PT to assist with strengthening/mobility   - Keep Call bell within reach  - Keep bed low and locked with side rails adjusted as appropriate  - Keep care items and personal belongings within reach  - Initiate and maintain comfort rounds  - Make Fall Risk Sign visible to staff  - Offer Toileting every 2 Hours, in advance of need  - Initiate/Maintain bed alarm  - Obtain necessary fall risk management equipment:   - Apply yellow socks and bracelet for high fall risk patients  - Consider moving patient to room near nurses station  Outcome: Progressing  Goal: Maintain or return to baseline ADL function  Description: INTERVENTIONS:  -  Assess patient's ability to carry out ADLs; assess patient's baseline for ADL function and identify physical deficits which impact ability to perform ADLs (bathing, care of mouth/teeth, toileting, grooming, dressing, etc.)  - Assess/evaluate cause of self-care deficits   - Assess range of motion  - Assess patient's mobility; develop plan if impaired  - Assess patient's need for assistive devices and provide as appropriate  - Encourage maximum independence but intervene and supervise when necessary  - Involve family in performance of ADLs  - Assess for home care needs following discharge   - Consider OT consult to assist with ADL evaluation and planning for discharge  - Provide patient education as appropriate  Outcome: Progressing  Goal: Maintains/Returns to pre admission functional level  Description: INTERVENTIONS:  - Perform BMAT or MOVE assessment daily.   - Set and communicate daily mobility goal to care team and patient/family/caregiver. - Collaborate with rehabilitation services on mobility goals if consulted  - Perform Range of Motion 3 times a day. - Reposition patient every 2 hours.   - Dangle patient 3 times a day  - Stand patient 3 times a day  - Ambulate patient 3 times a day  - Out of bed to chair 3 times a day   - Out of bed for meals 3 times a day  - Out of bed for toileting  - Record patient progress and toleration of activity level   Outcome: Progressing     Problem: DISCHARGE PLANNING  Goal: Discharge to home or other facility with appropriate resources  Description: INTERVENTIONS:  - Identify barriers to discharge w/patient and caregiver  - Arrange for needed discharge resources and transportation as appropriate  - Identify discharge learning needs (meds, wound care, etc.)  - Arrange for interpretive services to assist at discharge as needed  - Refer to Case Management Department for coordinating discharge planning if the patient needs post-hospital services based on physician/advanced practitioner order or complex needs related to functional status, cognitive ability, or social support system  Outcome: Progressing     Problem: Knowledge Deficit  Goal: Patient/family/caregiver demonstrates understanding of disease process, treatment plan, medications, and discharge instructions  Description: Complete learning assessment and assess knowledge base.   Interventions:  - Provide teaching at level of understanding  - Provide teaching via preferred learning methods  Outcome: Progressing     Problem: Prexisting or High Potential for Compromised Skin Integrity  Goal: Skin integrity is maintained or improved  Description: INTERVENTIONS:  - Identify patients at risk for skin breakdown  - Assess and monitor skin integrity  - Assess and monitor nutrition and hydration status  - Monitor labs   - Assess for incontinence   - Turn and reposition patient  - Assist with mobility/ambulation  - Relieve pressure over bony prominences  - Avoid friction and shearing  - Provide appropriate hygiene as needed including keeping skin clean and dry  - Evaluate need for skin moisturizer/barrier cream  - Collaborate with interdisciplinary team   - Patient/family teaching  - Consider wound care consult   Outcome: Progressing

## 2023-08-13 NOTE — CASE MANAGEMENT
Case Management Discharge Planning Note    Patient name Rose Pump  Location [facility] [address] Briana Jerry* MRN [id number]  : 1960 Date 2023       Current Admission Date: 2023  Current Admission Diagnosis:Pulmonary embolism [facility])   Patient Active Problem List    Diagnosis Date Noted   • Pulmonary embolism ([address]) 2023   • Diabetic foot ulcer ([address]) 2023   • Ulcer of left heel ([address]) 2023   • Homeless 2023   • Renal insufficiency 2023   • Encounter for support and coordination of transition of care 2023   • Food insecurity 2023   • Leukocytosis 2023   • Friction blisters of the soles 2023   • Calculus of gallbladder without cholecystitis without obstruction 2023   • Sepsis ([address]) 2023   • Diabetes mellitus ([address]) 2023   • Abnormal CT scan 2023   • Suspected pressure injury of deep tissue 03/15/2023   • Dyslipidemia 2023   • Insulin long-term use ([address]) 2023   • Type 2 diabetes mellitus with hyperglycemia ([address]) 2023   • Ambulatory dysfunction 2023   • Legally blind 2023   • Diarrhea 2023   • Bilateral lower extremity edema 2023   • Unsheltered homelessness 2023   • Abnormal urinalysis 2023   • Weakness 2023   • Intertrigo 2023   • Leg numbness 2023   • Unsteadiness on feet 2022   • Chronic migraine without aura, not intractable, without status migrainosus 2022   • Obstructive sleep apnea 2022   • Diabetic neuropathy ([address]) 2022   • Stage 3a chronic kidney disease ([address]) 2022   • Polymyalgia rheumatica ([address]) 2021   • Gait instability 2021   • Ulnar neuropathy of right upper extremity    • Blurry vision, bilateral 2021   • Depressed mood 10/08/2020   • Type 2 diabetes mellitus with other specified complication ([address])    • Hypertension 05/10/2020   • Chronic migraine without aura without status migrainosus, not intractable 04/22/2020   • Hypersomnia 04/06/2020   • Cerebral aneurysm without rupture 04/06/2020   • History of absence seizures 03/25/2020   • Thyroid nodule 03/06/2020   • Aneurysm ([address]) 03/05/2020   • Type 2 diabetes mellitus with hyperglycemia, with long-term current use of insulin ([address]) 02/21/2020   • Left cavernous carotid aneurysm 02/10/2020   • Polyneuropathy associated with underlying disease ([address]) 01/07/2020   • PMR (polymyalgia rheumatica) ([address]) 01/07/2020   • Thrombocytosis 01/07/2020   • Morbid obesity ([address]) 09/19/2019   • Other inflammatory and immune myopathies, not elsewhere classified 09/16/2019   • Transaminitis 09/16/2019   • Frequent headaches 07/09/2019   • Type 2 diabetes mellitus with microalbuminuria, with long-term current use of insulin ([address]) 02/01/2019   • Mild intermittent asthma without complication 80/34/5841   • Orthostatic hypotension 06/25/2018   • Lung nodules 03/22/2018   • Exertional dyspnea 02/13/2018   • Enlarged pulmonary artery ([address]) 02/13/2018   • Aortic dilatation ([address]) 02/13/2018   • Uncontrolled type 2 diabetes mellitus with hyperglycemia ([address])    • Seizures ([address])    • Obstructive sleep apnea (adult) (pediatric) 12/18/2017   • QT prolongation 12/18/2017   • Decreased pulses in feet 12/11/2017   • Microalbuminuria 09/08/2015   • Vitamin D deficiency 06/06/2014   • Visual impairment in both eyes 12/06/2012   • Anemia 03/20/2012   • Hyperlipidemia 03/20/2012   • Class 3 severe obesity with serious comorbidity and body mass index (BMI) of 50.0 to 59.9 in adult [facility]) 03/20/2012   • Neuropathy of hand, right 03/20/2012      LOS (days): 5  Geometric Mean LOS (GMLOS) (days): 2.50  Days to GMLOS:-2.3     OBJECTIVE:  Risk of Unplanned Readmission Score: 48.7         Current admission status: Inpatient   Preferred Pharmacy:   [facility]-[address] [place] [address]  Phone: [phone] Fax: 1 [facility] Zachariah Bee [address], [address] [facility],  [address] [place] [address],  [address] [place] [address]  Phone: [phone] Fax: [phone]    Primary Care Provider: Stephon Monroy MD    Primary Insurance: [facility] [place] DUAL COMPLETE [facility] HOSPITAL REP  Secondary Insurance: [facility]    DISCHARGE DETAILS:    Discharge planning discussed with[de-identified] Pt and pt's son. Freedom of Choice: Yes  Comments - Freedom of Choice: Accepting facility list printed and read to pt by CM, as pt states that she is legally blind. Pt has selected [facility] for STR, which was reserved in Aidin. Awaiting facility confirmation and auth initiation. CM contacted family/caregiver?: Yes  Were Treatment Team discharge recommendations reviewed with patient/caregiver?: Yes  Did patient/caregiver verbalize understanding of patient care needs?: N/A- going to facility       Contacts  Patient Contacts: Abbi Devlin  Relationship to Patient[de-identified] Family  Contact Method: Phone  Phone Number: Son does not have a working phone at this time. Reason/Outcome: Referral, Discharge [facility]         Is the patient interested in [facility] at discharge?: No    DME Referral Provided  Referral made for DME?: No    Other Referral/Resources/Interventions Provided:  Interventions: Short Term Rehab         Treatment Team Recommendation: Short Term Rehab  Discharge Destination Plan[de-identified] Short Term Rehab         8708 - IMM completed with pt and pt's son. Copy handed to pt's son. Original placed in scan bin. 1421 - Pt has selected [facility] for STR (NPI: [id number] Margarito Lau NPI [id number]). Auth initiated via [employer]. Han Gary RL#[id number]. PT/OT aware via TT that new notes are needed for auth and will see pt tomorrow. Clinicals faxed to [facility] [phone], as available.        1553 - Attempt x2 faxing auth clinicals, at [place] S2 fax machine and S2 main nurse's station fax, w/ "error occurred message." Third attempt now at [facility] S2 office fax. Will leave message regarding same for Mon CM and CM D/C Support.

## 2023-08-13 NOTE — ASSESSMENT & PLAN NOTE
Lab Results   Component Value Date    HGBA1C 10.2 (A) 07/21/2023       Recent Labs     08/12/23  1121 08/12/23  1606 08/12/23  2102 08/13/23  0719   POCGLU 102 207* 152* 159*       Blood Sugar Average: Last 72 hrs:  (P) [id number]   · large right plantar surface diabetic foot ulcer almost the entire length of her foot no exposed bone noted   · Continue on ancef day 6  · Blood cultures negative at 4 days , follow would culture showing gram positive rods   · X rays bilateral feet with no evidence of osteomyelitis   · Podiatry consulted no need for surgical intervention

## 2023-08-13 NOTE — PROGRESS NOTES
-- Patient: Mayra Collins  -- MRN: [id number]  -- [facility] Request ID: [id number]  -- Level of care reserved:  -- Partner Reserved:  -- Clinical needs requested:  -- Geography searched: 20 miles around [address] [facility]  -- Start of Service:  -- Request sent: 10:13am EDT on 8/10/2023 by Kusum Christine  -- Partner reserved: by Em Whaley  -- Choice list shared: 11:10am EDT on 8/13/2023 by Em Whaley

## 2023-08-13 NOTE — PLAN OF CARE
Problem: Potential for Falls  Goal: Patient will remain free of falls  Description: INTERVENTIONS:  - Educate patient/family on patient safety including physical limitations  - Instruct patient to call for assistance with activity   - Consult OT/PT to assist with strengthening/mobility   - Keep Call bell within reach  - Keep bed low and locked with side rails adjusted as appropriate  - Keep care items and personal belongings within reach  - Initiate and maintain comfort rounds  - Apply yellow socks and bracelet for high fall risk patients  - Consider moving patient to room near nurses station  Outcome: Progressing     Problem: MOBILITY - ADULT  Goal: Maintain or return to baseline ADL function  Description: INTERVENTIONS:  -  Assess patient's ability to carry out ADLs; assess patient's baseline for ADL function and identify physical deficits which impact ability to perform ADLs (bathing, care of mouth/teeth, toileting, grooming, dressing, etc.)  - Assess/evaluate cause of self-care deficits   - Assess range of motion  - Assess patient's mobility; develop plan if impaired  - Assess patient's need for assistive devices and provide as appropriate  - Encourage maximum independence but intervene and supervise when necessary  - Involve family in performance of ADLs  - Assess for home care needs following discharge   - Consider OT consult to assist with ADL evaluation and planning for discharge  - Provide patient education as appropriate  Outcome: Progressing  Goal: Maintains/Returns to pre admission functional level  Description: INTERVENTIONS:  - Perform BMAT or MOVE assessment daily.   - Set and communicate daily mobility goal to care team and patient/family/caregiver.    - Collaborate with rehabilitation services on mobility goals if consulted  - Out of bed for toileting  - Record patient progress and toleration of activity level   Outcome: Progressing     Problem: PAIN - ADULT  Goal: Verbalizes/displays adequate comfort level or baseline comfort level  Description: Interventions:  - Encourage patient to monitor pain and request assistance  - Assess pain using appropriate pain scale  - Administer analgesics based on type and severity of pain and evaluate response  - Implement non-pharmacological measures as appropriate and evaluate response  - Consider cultural and social influences on pain and pain management  - Notify physician/advanced practitioner if interventions unsuccessful or patient reports new pain  Outcome: Progressing     Problem: INFECTION - ADULT  Goal: Absence or prevention of progression during hospitalization  Description: INTERVENTIONS:  - Assess and monitor for signs and symptoms of infection  - Monitor lab/diagnostic results  - Monitor all insertion sites, i.e. indwelling lines, tubes, and drains  - Monitor endotracheal if appropriate and nasal secretions for changes in amount and color  - [place] appropriate cooling/warming therapies per order  - Administer medications as ordered  - Instruct and encourage patient and family to use good hand hygiene technique  - Identify and instruct in appropriate isolation precautions for identified infection/condition  Outcome: Progressing  Goal: Absence of fever/infection during neutropenic period  Description: INTERVENTIONS:  - Monitor WBC    Outcome: Progressing     Problem: SAFETY ADULT  Goal: Patient will remain free of falls  Description: INTERVENTIONS:  - Educate patient/family on patient safety including physical limitations  - Instruct patient to call for assistance with activity   - Consult OT/PT to assist with strengthening/mobility   - Keep Call bell within reach  - Keep bed low and locked with side rails adjusted as appropriate  - Keep care items and personal belongings within reach  - Initiate and maintain comfort rounds  - Make Fall Risk Sign visible to staff  - Offer Toileting every 2 Hours,  - Obtain necessary fall risk management equipment:  - Apply yellow socks and bracelet for high fall risk patients  - Consider moving patient to room near nurses station  Outcome: Progressing  Goal: Maintain or return to baseline ADL function  Description: INTERVENTIONS:  -  Assess patient's ability to carry out ADLs; assess patient's baseline for ADL function and identify physical deficits which impact ability to perform ADLs (bathing, care of mouth/teeth, toileting, grooming, dressing, etc.)  - Assess/evaluate cause of self-care deficits   - Assess range of motion  - Assess patient's mobility; develop plan if impaired  - Assess patient's need for assistive devices and provide as appropriate  - Encourage maximum independence but intervene and supervise when necessary  - Involve family in performance of ADLs  - Assess for home care needs following discharge   - Consider OT consult to assist with ADL evaluation and planning for discharge  - Provide patient education as appropriate  Outcome: Progressing  Goal: Maintains/Returns to pre admission functional level  Description: INTERVENTIONS:  - Perform BMAT or MOVE assessment daily.   - Set and communicate daily mobility goal to care team and patient/family/caregiver.    - Collaborate with rehabilitation services on mobility goals if consulted  - Record patient progress and toleration of activity level   Outcome: Progressing     Problem: DISCHARGE PLANNING  Goal: Discharge to home or other facility with appropriate resources  Description: INTERVENTIONS:  - Identify barriers to discharge w/patient and caregiver  - Arrange for needed discharge resources and transportation as appropriate  - Identify discharge learning needs (meds, wound care, etc.)  - Arrange for interpretive services to assist at discharge as needed  - Refer to Case Management Department for coordinating discharge planning if the patient needs post-hospital services based on physician/advanced practitioner order or complex needs related to functional status, cognitive ability, or social support system  Outcome: Progressing     Problem: Knowledge Deficit  Goal: Patient/family/caregiver demonstrates understanding of disease process, treatment plan, medications, and discharge instructions  Description: Complete learning assessment and assess knowledge base.   Interventions:  - Provide teaching at level of understanding  - Provide teaching via preferred learning methods  Outcome: Progressing     Problem: Prexisting or High Potential for Compromised Skin Integrity  Goal: Skin integrity is maintained or improved  Description: INTERVENTIONS:  - Identify patients at risk for skin breakdown  - Assess and monitor skin integrity  - Assess and monitor nutrition and hydration status  - Monitor labs   - Assess for incontinence   - Turn and reposition patient  - Assist with mobility/ambulation  - Relieve pressure over bony prominences  - Avoid friction and shearing  - Provide appropriate hygiene as needed including keeping skin clean and dry  - Evaluate need for skin moisturizer/barrier cream  - Collaborate with interdisciplinary team   - Patient/family teaching  - Consider wound care consult   Outcome: Progressing

## 2023-08-14 LAB
BACTERIA BLD CULT: NORMAL
BACTERIA BLD CULT: NORMAL
GLUCOSE SERPL-MCNC: 145 MG/DL (ref 65–140)
GLUCOSE SERPL-MCNC: 146 MG/DL (ref 65–140)
GLUCOSE SERPL-MCNC: 229 MG/DL (ref 65–140)
GLUCOSE SERPL-MCNC: 265 MG/DL (ref 65–140)

## 2023-08-14 PROCEDURE — 97530 THERAPEUTIC ACTIVITIES: CPT

## 2023-08-14 PROCEDURE — 99232 SBSQ HOSP IP/OBS MODERATE 35: CPT | Performed by: INTERNAL MEDICINE

## 2023-08-14 PROCEDURE — 97535 SELF CARE MNGMENT TRAINING: CPT

## 2023-08-14 PROCEDURE — 82948 REAGENT STRIP/BLOOD GLUCOSE: CPT

## 2023-08-14 PROCEDURE — 97110 THERAPEUTIC EXERCISES: CPT

## 2023-08-14 PROCEDURE — 99232 SBSQ HOSP IP/OBS MODERATE 35: CPT | Performed by: PODIATRIST

## 2023-08-14 PROCEDURE — NC001 PR NO CHARGE: Performed by: PODIATRIST

## 2023-08-14 PROCEDURE — 90715 TDAP VACCINE 7 YRS/> IM: CPT | Performed by: INTERNAL MEDICINE

## 2023-08-14 RX ADMIN — METOPROLOL TARTRATE 100 MG: 100 TABLET, FILM COATED ORAL at 22:52

## 2023-08-14 RX ADMIN — CEFAZOLIN SODIUM 2000 MG: 2 SOLUTION INTRAVENOUS at 22:54

## 2023-08-14 RX ADMIN — Medication 2000 UNITS: at 08:16

## 2023-08-14 RX ADMIN — INSULIN GLARGINE 25 UNITS: 100 INJECTION, SOLUTION SUBCUTANEOUS at 22:53

## 2023-08-14 RX ADMIN — TOPIRAMATE 100 MG: 25 TABLET, FILM COATED ORAL at 22:52

## 2023-08-14 RX ADMIN — CEFAZOLIN SODIUM 2000 MG: 2 SOLUTION INTRAVENOUS at 06:48

## 2023-08-14 RX ADMIN — DOCUSATE SODIUM 100 MG: 100 CAPSULE, LIQUID FILLED ORAL at 18:21

## 2023-08-14 RX ADMIN — INSULIN LISPRO 7 UNITS: 100 INJECTION, SOLUTION INTRAVENOUS; SUBCUTANEOUS at 08:06

## 2023-08-14 RX ADMIN — AMLODIPINE BESYLATE 5 MG: 5 TABLET ORAL at 08:16

## 2023-08-14 RX ADMIN — HYDRALAZINE HYDROCHLORIDE 10 MG: 10 TABLET, FILM COATED ORAL at 22:52

## 2023-08-14 RX ADMIN — INSULIN LISPRO 2 UNITS: 100 INJECTION, SOLUTION INTRAVENOUS; SUBCUTANEOUS at 18:22

## 2023-08-14 RX ADMIN — GABAPENTIN 100 MG: 100 CAPSULE ORAL at 18:21

## 2023-08-14 RX ADMIN — FUROSEMIDE 20 MG: 20 TABLET ORAL at 08:16

## 2023-08-14 RX ADMIN — ATORVASTATIN CALCIUM 40 MG: 40 TABLET, FILM COATED ORAL at 18:21

## 2023-08-14 RX ADMIN — METOPROLOL TARTRATE 100 MG: 100 TABLET, FILM COATED ORAL at 08:15

## 2023-08-14 RX ADMIN — HYDRALAZINE HYDROCHLORIDE 10 MG: 10 TABLET, FILM COATED ORAL at 18:21

## 2023-08-14 RX ADMIN — APIXABAN 10 MG: 5 TABLET, FILM COATED ORAL at 08:15

## 2023-08-14 RX ADMIN — BUDESONIDE AND FORMOTEROL FUMARATE DIHYDRATE 1 PUFF: 160; 4.5 AEROSOL RESPIRATORY (INHALATION) at 18:26

## 2023-08-14 RX ADMIN — INSULIN LISPRO 7 UNITS: 100 INJECTION, SOLUTION INTRAVENOUS; SUBCUTANEOUS at 18:23

## 2023-08-14 RX ADMIN — INSULIN GLARGINE 25 UNITS: 100 INJECTION, SOLUTION SUBCUTANEOUS at 08:19

## 2023-08-14 RX ADMIN — FUROSEMIDE 20 MG: 20 TABLET ORAL at 18:21

## 2023-08-14 RX ADMIN — DOCUSATE SODIUM 100 MG: 100 CAPSULE, LIQUID FILLED ORAL at 08:15

## 2023-08-14 RX ADMIN — GABAPENTIN 100 MG: 100 CAPSULE ORAL at 08:15

## 2023-08-14 RX ADMIN — INSULIN LISPRO 7 UNITS: 100 INJECTION, SOLUTION INTRAVENOUS; SUBCUTANEOUS at 12:43

## 2023-08-14 RX ADMIN — PREDNISONE 4 MG: 1 TABLET ORAL at 08:15

## 2023-08-14 RX ADMIN — BUDESONIDE AND FORMOTEROL FUMARATE DIHYDRATE 1 PUFF: 160; 4.5 AEROSOL RESPIRATORY (INHALATION) at 08:17

## 2023-08-14 RX ADMIN — LOSARTAN POTASSIUM 100 MG: 50 TABLET, FILM COATED ORAL at 08:16

## 2023-08-14 RX ADMIN — HYDRALAZINE HYDROCHLORIDE 10 MG: 10 TABLET, FILM COATED ORAL at 08:16

## 2023-08-14 RX ADMIN — APIXABAN 10 MG: 5 TABLET, FILM COATED ORAL at 18:21

## 2023-08-14 RX ADMIN — ASPIRIN 81 MG: 81 TABLET, COATED ORAL at 08:16

## 2023-08-14 RX ADMIN — CEFAZOLIN SODIUM 2000 MG: 2 SOLUTION INTRAVENOUS at 15:04

## 2023-08-14 RX ADMIN — INSULIN LISPRO 2 UNITS: 100 INJECTION, SOLUTION INTRAVENOUS; SUBCUTANEOUS at 22:54

## 2023-08-14 RX ADMIN — TETANUS TOXOID, REDUCED DIPHTHERIA TOXOID AND ACELLULAR PERTUSSIS VACCINE, ADSORBED 0.5 ML: 5; 2.5; 8; 8; 2.5 SUSPENSION INTRAMUSCULAR at 13:37

## 2023-08-14 NOTE — CASE MANAGEMENT
Case Management Discharge Planning Note    Patient name Violeta Stevens  Location [facility] [address] Dewey Thompson* MRN [id number]  : 1960 Date 2023               OBJECTIVE:  Risk of Unplanned Readmission Score: 50.22         Current admission status: Inpatient   Preferred Pharmacy:   [facility] [address], [address]  [address] [place] [address]  Phone: [phone] Fax: [phone]    [address] [place] [address],  [facility],  [facility] [address]  Phone: [phone] Fax: [phone]    Primary Care Provider: Magdalena Wu MD    Primary Insurance: [facility] DUAL COMPLETE [facility] HOSPITAL REP  Secondary Insurance: [address] DETAILS:                                          Other Referral/Resources/Interventions Provided:  Interventions: Short Term Rehab (Unfortunately, prior auth ref# started this weekend was for an acute rehab and so needed to be voided. New prior auth case started for [facility] 8/15/23 with KWB#[id number] provided- clinicals sent as requested.   awaiting determination)

## 2023-08-14 NOTE — PLAN OF CARE
Problem: PHYSICAL THERAPY ADULT  Goal: Performs mobility at highest level of function for planned discharge setting. See evaluation for individualized goals. Description: Treatment/Interventions: Functional transfer training, LE strengthening/ROM, Therapeutic exercise, Endurance training, Patient/family training, Bed mobility, Spoke to nursing, OT, Family, Gait training  Equipment Recommended: Wheelchair, Philippe Heath (pt has w/c)       See flowsheet documentation for full assessment, interventions and recommendations. Outcome: Progressing  Note: Prognosis: Fair  Problem List: Decreased strength, Decreased range of motion, Decreased endurance, Impaired balance, Decreased mobility, Impaired judgement, Pain, Obesity  Assessment: Pt. progressing well with overall mobility. Pt. needed VCs for techniques for transfers. Pt. noted with improved mobility and transfers as session progressed. pt. reported pain in LLE with WBing and mobility. Pt. needed icnreased assist with LLE ROM. Pt. able to follow cues well. Reported to RN pt. could be SPT to [facility]. Pt. very happy with the progress she has made in her mobility. Pt. seated on recliner post session with alarm engaged and all needs within reach. Pt, is functioning below her baseline due to decreased strength, and WBing restrictions and needs assist for all mobility. Pt. will benefit from continued skilled PT to address the mobiltiy, balance and strength deficits. Pt. was trasnferred from EOB to recliner and back twice during the session. Improved transfers noted with each attempt. Barriers to Discharge: None  Barriers to Discharge Comments: homelessness  PT Discharge Recommendation: Post acute rehabilitation services    See flowsheet documentation for full assessment.

## 2023-08-14 NOTE — PROGRESS NOTES
[facility]  Progress Note  Name: Glo Nugent  MRN: [id number]  Unit/Bed#: [facility] 2 [address] [facility] 222-02 I Date of Admission: 8/8/2023   Date of Service: 8/14/2023 I Hospital Day: 6    Assessment/Plan   * Pulmonary embolism [facility])  Assessment & Plan  · Patient has acute pulmonary embolism and lower extremity DVT  · Outpatient venous duplex showed acute non occlusive DVT in the left popliteal vein and in the posterior tibial veins  · ECHO stable from previous  · Continue on eliquis 10 mg BID 5/7 days then continue on eliquis 5 mg BID    Diabetic foot ulcer [facility])  Assessment & Plan  Lab Results   Component Value Date    HGBA1C 10.2 (A) 07/21/2023       Recent Labs     08/13/23  1139 08/13/23  1637 08/13/23 2054 08/14/23  0713   POCGLU 142* 176* 152* 146*       Blood Sugar Average: Last 72 hrs:  (P) 776.7807602996989178   · large right plantar surface diabetic foot ulcer almost the entire length of her foot no exposed bone noted   · Continue on ancef day 7/7  · Blood cultures negative at 5 days , follow would culture showing gram positive rods   · X rays bilateral feet with no evidence of osteomyelitis   · Podiatry consulted no need for surgical intervention    PMR (polymyalgia rheumatica) ([facility])  Assessment & Plan  · On chronic prednisone    Uncontrolled type 2 diabetes mellitus with hyperglycemia [facility])  Assessment & Plan  Lab Results   Component Value Date    HGBA1C 10.2 (A) 07/21/2023       Recent Labs     08/13/23  1139 08/13/23  1637 08/13/23 2054 08/14/23  0713   POCGLU 142* 176* 152* 146*       Blood Sugar Average: Last 72 hrs:  (P) 256.9184753053581823     · Continue Lantus 25 units and  7 units 3  times daily Humalog.   Add Humalog insulin sliding scale    QT prolongation  Assessment & Plan  · Prolonged Qtc improved to 484 on EKG   · Continue repleting electrolytes as needed           VTE Pharmacologic Prophylaxis: VTE Score: 3 Moderate Risk (Score 3-4) - Pharmacological DVT Prophylaxis Ordered: apixaban (Eliquis). Patient Centered Rounds: I performed bedside rounds with nursing staff today. Discussions with Specialists or Other Care Team Provider: MAI    Education and Discussions with Family / Patient: Updated  (son) at bedside. Total Time Spent on Date of Encounter in care of patient: 45 minutes This time was spent on one or more of the following: performing physical exam; counseling and coordination of care; obtaining or reviewing history; documenting in the medical record; reviewing/ordering tests, medications or procedures; communicating with other healthcare professionals and discussing with patient's family/caregivers. Current Length of Stay: 6 day(s)  Current Patient Status: Inpatient   Certification Statement: The patient will continue to require additional inpatient hospital stay due to pending placement in rehab  Discharge Plan: Anticipate discharge in 24-48 hrs to rehab facility. Code Status: Level 1 - Full Code    Subjective:   Patient was seen laying in bed today. She worked with PT/OT today. She denies any acute complaints at this time. Objective:     Vitals:   Temp (24hrs), Av.5 °F (36.9 °C), Min:97.9 °F (36.6 °C), Max:99 °F (37.2 °C)    Temp:  [97.9 °F (36.6 °C)-99 °F (37.2 °C)] 97.9 °F (36.6 °C)  HR:  [60-63] 60  Resp:  [16-18] 16  BP: (120-141)/(64-71) 141/71  SpO2:  [95 %-96 %] 96 %  Body mass index is 41.97 kg/m². Input and Output Summary (last 24 hours): Intake/Output Summary (Last 24 hours) at 2023 0927  Last data filed at 2023 1700  Gross per 24 hour   Intake --   Output 1200 ml   Net -1200 ml       Physical Exam:   Physical Exam  Vitals reviewed. Constitutional:       Appearance: Normal appearance. She is obese. HENT:      Head: Normocephalic and atraumatic. Eyes:      General: No scleral icterus. Cardiovascular:      Rate and Rhythm: Normal rate and regular rhythm.    Pulmonary:      Effort: Pulmonary effort is normal.      Breath sounds: Normal breath sounds. Abdominal:      General: Abdomen is flat. Bowel sounds are normal.      Palpations: Abdomen is soft. Tenderness: There is no abdominal tenderness. Musculoskeletal:      Right lower leg: Edema present. Left lower leg: Edema present. Skin:     General: Skin is warm and dry. Comments: Bilateral feet in guaze dressings   Neurological:      Mental Status: She is alert. Mental status is at baseline. Psychiatric:         Mood and Affect: Mood normal.         Behavior: Behavior normal.        Additional Data:     Labs:  Results from last 7 days   Lab Units 08/13/23  0444 08/11/23  0454 08/10/23  0512   WBC Thousand/uL 14.52*   < > 11.62*   HEMOGLOBIN g/dL 9.0*   < > 8.6*   HEMATOCRIT % 30.5*   < > 28.4*   PLATELETS Thousands/uL 467*   < > 413*   NEUTROS PCT %  --   --  62   LYMPHS PCT %  --   --  25   MONOS PCT %  --   --  8   EOS PCT %  --   --  3    < > = values in this interval not displayed. Results from last 7 days   Lab Units 08/13/23  0444 08/09/23  0628 08/08/23  1302   SODIUM mmol/L 139   < > 136   POTASSIUM mmol/L 4.1   < > 3.5   CHLORIDE mmol/L 108   < > 101   CO2 mmol/L 27   < > 27   BUN mg/dL 21   < > 19   CREATININE mg/dL 0.94   < > 0.85   ANION GAP mmol/L 4   < > 8   CALCIUM mg/dL 8.5   < > 9.3   ALBUMIN g/dL  --   --  3.4*   TOTAL BILIRUBIN mg/dL  --   --  0.79   ALK PHOS U/L  --   --  132*   ALT U/L  --   --  7   AST U/L  --   --  10*   GLUCOSE RANDOM mg/dL 164*   < > 260*    < > = values in this interval not displayed.      Results from last 7 days   Lab Units 08/08/23  2251   INR  1.13     Results from last 7 days   Lab Units 08/14/23  0713 08/13/23  2054 08/13/23  1637 08/13/23  1139 08/13/23  0719 08/12/23  2102 08/12/23  1606 08/12/23  1121 08/12/23  0754 08/11/23 2120 08/11/23  1609 08/11/23  1115   POC GLUCOSE mg/dl 146* 152* 176* 142* 159* 152* 207* 102 118 225* 304* 218*         Results from last 7 days   Lab Units 08/08/23  1533   LACTIC ACID mmol/L 1.8       Lines/Drains:  Invasive Devices     Peripheral Intravenous Line  Duration           Peripheral IV 08/12/23 Distal;Left;Upper;Ventral (anterior) Arm 2 days          Drain  Duration           External Urinary Catheter 5 days                      Imaging: No pertinent imaging reviewed. Recent Cultures (last 7 days):   Results from last 7 days   Lab Units 08/09/23  0945 08/08/23  2251   BLOOD CULTURE   --  No Growth After 5 Days. No Growth After 5 Days.    GRAM STAIN RESULT  3+ Polys*  2+ Gram positive rods*  --    WOUND CULTURE  4+ Growth of  --        Last 24 Hours Medication List:   Current Facility-Administered Medications   Medication Dose Route Frequency Provider Last Rate   • acetaminophen  650 mg Oral Q6H PRN Polly Perez, DO     • amLODIPine  5 mg Oral Daily Mic S Prechtel, DO     • apixaban  10 mg Oral BID Priya Victor PA-C      Followed by   • [START ON 8/17/2023] apixaban  5 mg Oral BID Priya Victor PA-C     • aspirin  81 mg Oral Daily Mic S Prechtel, DO     • atorvastatin  40 mg Oral Daily With [employer] S Prechtel, DO     • budesonide-formoterol  1 puff Inhalation BID Polly Perez, DO     • cefazolin  2,000 mg Intravenous Q8H Priya Victor PA-C 2,000 mg (08/14/23 1767)   • cholecalciferol  2,000 Units Oral Daily Mic S Prechtel, DO     • docusate sodium  100 mg Oral BID Mic S Prechtel, DO     • furosemide  20 mg Oral BID Mic S Prechtel, DO     • gabapentin  100 mg Oral BID Mic S Prechtel, DO     • hydrALAZINE  10 mg Oral TID Polly Perez, DO     • insulin glargine  25 Units Subcutaneous Q12H [address] Mic S Prechtel, DO     • insulin lispro  1-5 Units Subcutaneous HS Mic S Prechtel, DO     • insulin lispro  1-6 Units Subcutaneous TID AC Mic S Prechtel, DO     • insulin lispro  7 Units Subcutaneous TID With [employer] Deja Zarate PA-C     • loperamide  2 mg Oral 4x Daily PRN Trudegopi Sears Prechtel, DO     • losartan  100 mg Oral Daily Trudell Orion Prechtel, DO     • menthol-methyl salicylate   Apply externally 4x Daily PRN Mariya Willis PA-C     • metoprolol tartrate  100 mg Oral Q12H [address] Mic S Prechtel, DO     • ondansetron  4 mg Intravenous Q6H PRN Trgina Sears Prechtel, DO     • predniSONE  4 mg Oral Daily Mic S Prechtel, DO     • topiramate  100 mg Oral HS [facility] [place], DO          Today, Patient Was Seen By: Abisai Rowe PA-C    **Please Note: This note may have been constructed using a voice recognition system. **

## 2023-08-14 NOTE — ASSESSMENT & PLAN NOTE
Lab Results   Component Value Date    HGBA1C 10.2 (A) 07/21/2023       Recent Labs     08/13/23  1139 08/13/23  1637 08/13/23 2054 08/14/23  0713   POCGLU 142* 176* 152* 146*       Blood Sugar Average: Last 72 hrs:  (P) 160.4038439111359290     · Continue Lantus 25 units and  7 units 3  times daily Humalog.   Add Humalog insulin sliding scale

## 2023-08-14 NOTE — OCCUPATIONAL THERAPY NOTE
Occupational Therapy Progress Note     Patient Name: Rajinder Lord  [id number] Date: 8/14/2023  Problem List  Principal Problem:    Pulmonary embolism ([address])  Active Problems:    Uncontrolled type 2 diabetes mellitus with hyperglycemia ([facility])    QT prolongation    PMR (polymyalgia rheumatica) ([facility])    Diabetic foot ulcer ([address])            08/14/23 0858   Note Type   Note Type Treatment   Pain Assessment   Pain Assessment Tool 0-10   Pain Score No Pain  (supine)   Restrictions/Precautions   Weight Bearing Precautions Per Order Yes   RLE Weight Bearing Per Order   (Per Podiatry 8/9/23: Weightbearing to feet for transfers only, otherwise please stay nonweightbearing as much as possible.)   LLE Weight Bearing Per Order)   LLE Weight Bearing Per Order   (Per Podiatry 8/9/23: Weightbearing to feet for transfers only, otherwise please stay nonweightbearing as much as possible.)   LLE Weight Bearing Per Order)   Other Precautions Fall Risk;Pain;Multiple lines; Chair Alarm; Bed Alarm   ADL   Where Assessed Edge of bed   Eating Assistance 6  Modified independent   Grooming Assistance 6  Modified Independent   UB Bathing Assistance 5  Supervision/Setup   LB Bathing Assistance 3  Moderate Assistance   UB Dressing Assistance 5  Supervision/Setup   LB Dressing Assistance 2  Maximal [facility] [place] [address]  2  Maximal Assistance   Functional Standing Tolerance   Time 1-2mins   Bed Mobility   Rolling R 5  Supervision   Rolling L 5  Supervision   Supine to Sit 5  Supervision   Additional items Increased time required;Verbal cues   Transfers   Sit to Stand 4  Minimal assistance   Additional items Assist x 1; Increased time required;Verbal cues   Stand to Sit 4  Minimal assistance   Additional items Assist x 1; Increased time required;Verbal cues   Functional Mobility   Functional Mobility 4  Minimal assistance   Additional Comments x1   Additional items Rolling walker   Subjective   Subjective "I am afraid to move in bed because of the alarm."   Cognition   Overall Cognitive Status Impaired   Arousal/Participation Alert   Attention Attends with cues to redirect   Orientation Level Oriented X4   Memory Decreased short term memory;Decreased recall of precautions   Following Commands Follows one step commands with increased time or repetition   Activity Tolerance   Activity Tolerance Patient limited by fatigue;Patient limited by pain   Medical Staff Made Aware nsg, P.T. Assessment   Assessment Pt seen for 38 min tx session with focus on functional balance, functional mobility, ADL status, transfer safety, and cognition. Per orders, pt is NWB b/l LE's, but per podiatry progress noted, allowed WBAT b/l LE for transfers only; pt aware of precautions. Pt able to tolerate OOB mobility; sitting balance=f+/f, standing balance=f-/p+. Pt required verbal/physical assistance to maintain transfer safety. Pt demonstrating need for assistance with her LE ADLs. Pt able to demonstrate good b/l UE AROM throughout. Slight cognitive deficits noted(i.e.memory, problem-solving). Pt continues to demonstrate appropriateness for inpt rehab to improve her overall level of independence. Will continue. The patient's raw score on the AM-PAC Daily Activity Inpatient Short Form is 16. A raw score of less than 19 suggests the patient may benefit from discharge to post-acute rehabilitation services. Please refer to the recommendation of the Occupational Therapist for safe discharge planning. Plan   Treatment Interventions ADL retraining;Functional transfer training;UE strengthening/ROM; Endurance training;Cognitive reorientation;Patient/family training;Equipment evaluation/education; Compensatory technique education;Continued evaluation   Goal Expiration Date 08/23/23   OT Treatment Day 1   OT Frequency 2-3x/wk   Recommendation   OT Discharge Recommendation Post acute rehabilitation services   [facility] Daily Activity Inpatient   Lower Body Dressing 2   Bathing 2   Toileting 1   Upper Body Dressing 3   Grooming 4   Eating 4   Daily Activity Raw Score 16   Daily Activity Standardized Score (Calc for Raw Score >=11) 35.96   AM-PAC Applied Cognition Inpatient   Following a Speech/Presentation 4   Understanding Ordinary Conversation 4   Taking Medications 3   Remembering Where Things Are Placed or Put Away 3   Remembering List of 4-5 Errands 3   Taking Care of Complicated Tasks 3   Applied Cognition Raw Score 20   Applied Cognition Standardized Score 41.76     Karla Lowe

## 2023-08-14 NOTE — CASE MANAGEMENT
Per CM request, faxed clinicals to Radha Renteria @ [phone] for previous started auth. CM notified.

## 2023-08-14 NOTE — PHYSICAL THERAPY NOTE
PT UPDATED GOALS       08/11/23 1600   Note Type   Note Type   (Updated PT goals)   Assessment   Assessment Late entry note. PT goals updated as pt not allowed to ambulate. As per Podiatry note on 8/9/23, "Weightbearing to feet for transfers only, otherwise please stay nonweightbearing as much as possible." Pt progressing well in PT. Please see progress note for current level of function. Updated PT goals stated below.    Barriers to Discharge Other (Comment)   Barriers to Discharge Comments homelessness   Goals   STG Expiration Date 08/28/23   Short Term Goal #1 Goals to be met in 14 days; pt will be able to: 1) inc strength & balance by 1/2 grade to improve overall functional mobility & dec fall risk; 2) inc bed mobility to modified I for pt to be able to get in/OOB safely w/ proper techniques 100% of the time, to dec caregiver burden & safely function at home; 3) inc transfers to S w/ appropriate AD for pt to transition safely from one surface to another w/o % of the time, to dec caregiver burden & safely function at home; 4) pt/caregiver ed     Pee Mejia

## 2023-08-14 NOTE — ASSESSMENT & PLAN NOTE
Lab Results   Component Value Date    HGBA1C 10.2 (A) 07/21/2023       Recent Labs     08/13/23  1139 08/13/23  1637 08/13/23 2054 08/14/23  0713   POCGLU 142* 176* 152* 146*       Blood Sugar Average: Last 72 hrs:  (P) 452.1494908555306838   · large right plantar surface diabetic foot ulcer almost the entire length of her foot no exposed bone noted   · Continue on ancef day 7/7  · Blood cultures negative at 5 days , follow would culture showing gram positive rods   · X rays bilateral feet with no evidence of osteomyelitis   · Podiatry consulted no need for surgical intervention

## 2023-08-14 NOTE — PLAN OF CARE
Problem: Potential for Falls  Goal: Patient will remain free of falls  Description: INTERVENTIONS:  - Educate patient/family on patient safety including physical limitations  - Instruct patient to call for assistance with activity   - Consult OT/PT to assist with strengthening/mobility   - Keep Call bell within reach  - Keep bed low and locked with side rails adjusted as appropriate  - Keep care items and personal belongings within reach  - Initiate and maintain comfort rounds  - Make Fall Risk Sign visible to staff  - Apply yellow socks and bracelet for high fall risk patients  - Consider moving patient to room near nurses station  Outcome: Progressing     Problem: MOBILITY - ADULT  Goal: Maintain or return to baseline ADL function  Description: INTERVENTIONS:  -  Assess patient's ability to carry out ADLs; assess patient's baseline for ADL function and identify physical deficits which impact ability to perform ADLs (bathing, care of mouth/teeth, toileting, grooming, dressing, etc.)  - Assess/evaluate cause of self-care deficits   - Assess range of motion  - Assess patient's mobility; develop plan if impaired  - Assess patient's need for assistive devices and provide as appropriate  - Encourage maximum independence but intervene and supervise when necessary  - Involve family in performance of ADLs  - Assess for home care needs following discharge   - Consider OT consult to assist with ADL evaluation and planning for discharge  - Provide patient education as appropriate  Outcome: Progressing  Goal: Maintains/Returns to pre admission functional level  Description: INTERVENTIONS:  - Perform BMAT or MOVE assessment daily.   - Set and communicate daily mobility goal to care team and patient/family/caregiver.    - Collaborate with rehabilitation services on mobility goals if consulted  - Out of bed for toileting  - Record patient progress and toleration of activity level   Outcome: Progressing     Problem: PAIN - ADULT  Goal: Verbalizes/displays adequate comfort level or baseline comfort level  Description: Interventions:  - Encourage patient to monitor pain and request assistance  - Assess pain using appropriate pain scale  - Administer analgesics based on type and severity of pain and evaluate response  - Implement non-pharmacological measures as appropriate and evaluate response  - Consider cultural and social influences on pain and pain management  - Notify physician/advanced practitioner if interventions unsuccessful or patient reports new pain  Outcome: Progressing     Problem: INFECTION - ADULT  Goal: Absence or prevention of progression during hospitalization  Description: INTERVENTIONS:  - Assess and monitor for signs and symptoms of infection  - Monitor lab/diagnostic results  - Monitor all insertion sites, i.e. indwelling lines, tubes, and drains  - Monitor endotracheal if appropriate and nasal secretions for changes in amount and color  - [place] appropriate cooling/warming therapies per order  - Administer medications as ordered  - Instruct and encourage patient and family to use good hand hygiene technique  - Identify and instruct in appropriate isolation precautions for identified infection/condition  Outcome: Progressing  Goal: Absence of fever/infection during neutropenic period  Description: INTERVENTIONS:  - Monitor WBC    Outcome: Progressing     Problem: SAFETY ADULT  Goal: Patient will remain free of falls  Description: INTERVENTIONS:  - Educate patient/family on patient safety including physical limitations  - Instruct patient to call for assistance with activity   - Consult OT/PT to assist with strengthening/mobility   - Keep Call bell within reach  - Keep bed low and locked with side rails adjusted as appropriate  - Keep care items and personal belongings within reach  - Initiate and maintain comfort rounds  - Make Fall Risk Sign visible to staff  - Apply yellow socks and bracelet for high fall risk patients  - Consider moving patient to room near nurses station  Outcome: Progressing  Goal: Maintain or return to baseline ADL function  Description: INTERVENTIONS:  -  Assess patient's ability to carry out ADLs; assess patient's baseline for ADL function and identify physical deficits which impact ability to perform ADLs (bathing, care of mouth/teeth, toileting, grooming, dressing, etc.)  - Assess/evaluate cause of self-care deficits   - Assess range of motion  - Assess patient's mobility; develop plan if impaired  - Assess patient's need for assistive devices and provide as appropriate  - Encourage maximum independence but intervene and supervise when necessary  - Involve family in performance of ADLs  - Assess for home care needs following discharge   - Consider OT consult to assist with ADL evaluation and planning for discharge  - Provide patient education as appropriate  Outcome: Progressing  Goal: Maintains/Returns to pre admission functional level  Description: INTERVENTIONS:  - Perform BMAT or MOVE assessment daily.   - Set and communicate daily mobility goal to care team and patient/family/caregiver.    - Collaborate with rehabilitation services on mobility goals if consulted  - Out of bed for toileting  - Record patient progress and toleration of activity level   Outcome: Progressing     Problem: DISCHARGE PLANNING  Goal: Discharge to home or other facility with appropriate resources  Description: INTERVENTIONS:  - Identify barriers to discharge w/patient and caregiver  - Arrange for needed discharge resources and transportation as appropriate  - Identify discharge learning needs (meds, wound care, etc.)  - Arrange for interpretive services to assist at discharge as needed  - Refer to Case Management Department for coordinating discharge planning if the patient needs post-hospital services based on physician/advanced practitioner order or complex needs related to functional status, cognitive ability, or social support system  Outcome: Progressing     Problem: Knowledge Deficit  Goal: Patient/family/caregiver demonstrates understanding of disease process, treatment plan, medications, and discharge instructions  Description: Complete learning assessment and assess knowledge base.   Interventions:  - Provide teaching at level of understanding  - Provide teaching via preferred learning methods  Outcome: Progressing     Problem: Prexisting or High Potential for Compromised Skin Integrity  Goal: Skin integrity is maintained or improved  Description: INTERVENTIONS:  - Identify patients at risk for skin breakdown  - Assess and monitor skin integrity  - Assess and monitor nutrition and hydration status  - Monitor labs   - Assess for incontinence   - Turn and reposition patient  - Assist with mobility/ambulation  - Relieve pressure over bony prominences  - Avoid friction and shearing  - Provide appropriate hygiene as needed including keeping skin clean and dry  - Evaluate need for skin moisturizer/barrier cream  - Collaborate with interdisciplinary team   - Patient/family teaching  - Consider wound care consult   Outcome: Progressing

## 2023-08-14 NOTE — PHYSICAL THERAPY NOTE
Physical Therapy Treatment Note     08/14/23 1016   PT Last Visit   PT Visit Date 08/14/23   Note Type   Note Type Treatment   Pain Assessment   Pain Assessment Tool 0-10   Pain Score 5   Pain Location/Orientation Orientation: Left; Location: Leg   Restrictions/Precautions   Weight Bearing Precautions Per Order Yes   RLE Weight Bearing Per Order (S)  Other  (WBAT for transfers only in surgical shoes)   LLE Weight Bearing Per Order (S)  Other  (WBAT for transfers only in surgical shoes)   Braces or Orthoses Other (Comment)  (b?l LE surgical shoes)   Other Precautions WBS; Fall Risk;Pain;Telemetry   General   Chart Reviewed Yes   Family/Caregiver Present Yes   Subjective   Subjective Pt. agreeable to PT   Bed Mobility   Supine to Sit 5  Supervision   Additional items Assist x 1;Bedrails; Increased time required;Verbal cues   Sit to Supine 5  Supervision   Additional items Assist x 1;Bedrails; Increased time required;Verbal cues   Transfers   Sit to Stand 4  Minimal assistance   Additional items Assist x 1; Increased time required;Verbal cues; Bedrails;Armrests   Stand to Sit 4  Minimal assistance   Additional items Assist x 1; Increased time required;Verbal cues; Bedrails;Armrests   Stand pivot 4  Minimal assistance   Additional items Assist x 1; Increased time required;Verbal cues;Armrests  (with RW)   Balance   Static Sitting Good   Dynamic Sitting Fair +   Static Standing Fair   Dynamic Standing Fair -   Endurance Deficit   Endurance Deficit Yes   Endurance Deficit Description generalized weakness   Activity Tolerance   Activity Tolerance Patient tolerated treatment well   Nurse Made Aware yes   Exercises   TKR Sitting;Supine;20 reps;AAROM;AROM; Bilateral   Assessment   Prognosis Fair   Problem List Decreased strength;Decreased range of motion;Decreased endurance; Impaired balance;Decreased mobility; Impaired judgement;Pain;Obesity   Assessment Pt. progressing well with overall mobility.  Pt. needed VCs for techniques for transfers. Pt. noted with improved mobility and transfers as session progressed. pt. reported pain in LLE with WBing and mobility. Pt. needed icnreased assist with LLE ROM. Pt. able to follow cues well. Reported to RN pt. could be SPT to [facility]. Pt. very happy with the progress she has made in her mobility. Pt. seated on recliner post session with alarm engaged and all needs within reach. Pt, is functioning below her baseline due to decreased strength, and WBing restrictions and needs assist for all mobility. Pt. will benefit from continued skilled PT to address the mobiltiy, balance and strength deficits. Pt. was trasnferred from EOB to recliner and back twice during the session. Improved transfers noted with each attempt. Barriers to Discharge None   Goals   Patient Goals None reproted   STG Expiration Date 08/23/23   PT Treatment Day 2   Plan   Treatment/Interventions LE strengthening/ROM; Functional transfer training; Therapeutic exercise;Spoke to nursing;Equipment eval/education; Bed mobility; Patient/family training;OT   Progress Progressing toward goals   PT Frequency 3-5x/wk   Recommendation   PT Discharge Recommendation Post acute rehabilitation services   Equipment Recommended Wheelchair;Walker   AM-PAC Basic Mobility Inpatient   Turning in Flat Bed Without Bedrails 3   Lying on Back to Sitting on Edge of Flat Bed Without Bedrails 3   Moving Bed to Chair 3   Standing Up From Chair Using Arms 3   Walk in Room 3   Climb 3-5 Stairs With Railing 2   Basic Mobility Inpatient Raw Score 17   Basic Mobility Standardized Score 39.67   Turning Head Towards Sound 4   Follow Simple Instructions 4   Low Function Basic Mobility Raw Score  25   Low Function Basic Mobility Standardized Score  39.85   Highest Level Of Mobility   JH-HLM Goal 5: Stand one or more mins   JH-HLM Achieved 4: Move to chair/commode   End of Consult   Patient Position at End of Consult All needs within reach;Bed/Chair alarm activated; Bedside chair Zina Betancourt, PTA    An AM-PAC basic mobility standardized score less than 42.9 suggest the patient may benefit from discharge to post-acute rehab services.

## 2023-08-14 NOTE — PLAN OF CARE
Problem: Potential for Falls  Goal: Patient will remain free of falls  Description: INTERVENTIONS:  - Educate patient/family on patient safety including physical limitations  - Instruct patient to call for assistance with activity   - Consult OT/PT to assist with strengthening/mobility   - Keep Call bell within reach  - Keep bed low and locked with side rails adjusted as appropriate  - Keep care items and personal belongings within reach  - Initiate and maintain comfort rounds  - Make Fall Risk Sign visible to staff  - Offer Toileting every 2 Hours, in advance of need  - Obtain necessary fall risk management equipment: bed rails  - Apply yellow socks and bracelet for high fall risk patients  - Consider moving patient to room near nurses station  Outcome: Progressing     Problem: MOBILITY - ADULT  Goal: Maintain or return to baseline ADL function  Description: INTERVENTIONS:  -  Assess patient's ability to carry out ADLs; assess patient's baseline for ADL function and identify physical deficits which impact ability to perform ADLs (bathing, care of mouth/teeth, toileting, grooming, dressing, etc.)  - Assess/evaluate cause of self-care deficits   - Assess range of motion  - Assess patient's mobility; develop plan if impaired  - Assess patient's need for assistive devices and provide as appropriate  - Encourage maximum independence but intervene and supervise when necessary  - Involve family in performance of ADLs  - Assess for home care needs following discharge   - Consider OT consult to assist with ADL evaluation and planning for discharge  - Provide patient education as appropriate  Outcome: Progressing  Goal: Maintains/Returns to pre admission functional level  Description: INTERVENTIONS:  - Perform BMAT or MOVE assessment daily.   - Set and communicate daily mobility goal to care team and patient/family/caregiver.    - Collaborate with rehabilitation services on mobility goals if consulted  - Perform Range of Motion 2 times a day. - Reposition patient every 2 hours.   - Record patient progress and toleration of activity level   Outcome: Progressing     Problem: PAIN - ADULT  Goal: Verbalizes/displays adequate comfort level or baseline comfort level  Description: Interventions:  - Encourage patient to monitor pain and request assistance  - Assess pain using appropriate pain scale  - Administer analgesics based on type and severity of pain and evaluate response  - Implement non-pharmacological measures as appropriate and evaluate response  - Consider cultural and social influences on pain and pain management  - Notify physician/advanced practitioner if interventions unsuccessful or patient reports new pain  Outcome: Progressing     Problem: INFECTION - ADULT  Goal: Absence or prevention of progression during hospitalization  Description: INTERVENTIONS:  - Assess and monitor for signs and symptoms of infection  - Monitor lab/diagnostic results  - Monitor all insertion sites, i.e. indwelling lines, tubes, and drains  - Monitor endotracheal if appropriate and nasal secretions for changes in amount and color  - [place] appropriate cooling/warming therapies per order  - Administer medications as ordered  - Instruct and encourage patient and family to use good hand hygiene technique  - Identify and instruct in appropriate isolation precautions for identified infection/condition  Outcome: Progressing  Goal: Absence of fever/infection during neutropenic period  Description: INTERVENTIONS:  - Monitor WBC    Outcome: Progressing     Problem: SAFETY ADULT  Goal: Patient will remain free of falls  Description: INTERVENTIONS:  - Educate patient/family on patient safety including physical limitations  - Instruct patient to call for assistance with activity   - Consult OT/PT to assist with strengthening/mobility   - Keep Call bell within reach  - Keep bed low and locked with side rails adjusted as appropriate  - Keep care items and personal belongings within reach  - Initiate and maintain comfort rounds  - Make Fall Risk Sign visible to staff  - Offer Toileting every 2 Hours, in advance of need  - Obtain necessary fall risk management equipment: bed rails  - Apply yellow socks and bracelet for high fall risk patients  - Consider moving patient to room near nurses station  Outcome: Progressing  Goal: Maintain or return to baseline ADL function  Description: INTERVENTIONS:  -  Assess patient's ability to carry out ADLs; assess patient's baseline for ADL function and identify physical deficits which impact ability to perform ADLs (bathing, care of mouth/teeth, toileting, grooming, dressing, etc.)  - Assess/evaluate cause of self-care deficits   - Assess range of motion  - Assess patient's mobility; develop plan if impaired  - Assess patient's need for assistive devices and provide as appropriate  - Encourage maximum independence but intervene and supervise when necessary  - Involve family in performance of ADLs  - Assess for home care needs following discharge   - Consider OT consult to assist with ADL evaluation and planning for discharge  - Provide patient education as appropriate  Outcome: Progressing  Goal: Maintains/Returns to pre admission functional level  Description: INTERVENTIONS:  - Perform BMAT or MOVE assessment daily.   - Set and communicate daily mobility goal to care team and patient/family/caregiver. - Collaborate with rehabilitation services on mobility goals if consulted  - Perform Range of Motion 2 times a day. - Reposition patient every 2 hours.   - Dangle patient 2 times a day  - Record patient progress and toleration of activity level   Outcome: Progressing     Problem: DISCHARGE PLANNING  Goal: Discharge to home or other facility with appropriate resources  Description: INTERVENTIONS:  - Identify barriers to discharge w/patient and caregiver  - Arrange for needed discharge resources and transportation as appropriate  - Identify discharge learning needs (meds, wound care, etc.)  - Arrange for interpretive services to assist at discharge as needed  - Refer to Case Management Department for coordinating discharge planning if the patient needs post-hospital services based on physician/advanced practitioner order or complex needs related to functional status, cognitive ability, or social support system  Outcome: Progressing     Problem: Knowledge Deficit  Goal: Patient/family/caregiver demonstrates understanding of disease process, treatment plan, medications, and discharge instructions  Description: Complete learning assessment and assess knowledge base.   Interventions:  - Provide teaching at level of understanding  - Provide teaching via preferred learning methods  Outcome: Progressing     Problem: Prexisting or High Potential for Compromised Skin Integrity  Goal: Skin integrity is maintained or improved  Description: INTERVENTIONS:  - Identify patients at risk for skin breakdown  - Assess and monitor skin integrity  - Assess and monitor nutrition and hydration status  - Monitor labs   - Assess for incontinence   - Turn and reposition patient  - Assist with mobility/ambulation  - Relieve pressure over bony prominences  - Avoid friction and shearing  - Provide appropriate hygiene as needed including keeping skin clean and dry  - Evaluate need for skin moisturizer/barrier cream  - Collaborate with interdisciplinary team   - Patient/family teaching  - Consider wound care consult   Outcome: Progressing

## 2023-08-14 NOTE — PROGRESS NOTES
[facility] Podiatry - Progress Note  Patient: Dee Dee Martinez 57 y.o. female   MRN: [id number]  PCP: Rona Fleischer, MD  Unit/Bed#: [facility] [address] 222- Encounter: [id number]  Date Of Visit: 23  Hospital Stay Days: 6    ASSESSMENT:    Dee Dee Martinez is a 57 y.o. female with:    1. Right plantar midfoot wound  2. Type 2 diabetes (HbA1c 10.2% from 2023)  3. Lower extremity DVT    PLAN:    · Continue local wound care from podiatric standpoint  · Placement pending to rehab  · Cultures negative: Mixed skin kelli  · Remain nonweightbearing is much as possible  · Discussed the importance of healthy diet, nonweightbearing, sugar control, and wound care follow-up today with patient. · Elevate and offload  · Risk of primary team    Weight bearing status: Weight-bear to feet for transfers only. SUBJECTIVE:     The patient was seen, evaluated, and assessed at bedside today. The patient was awake, alert, and in no acute distress. No acute events overnight. The patient reports feeling well. Patient denies N/V/F/chills/SOB/CP. OBJECTIVE:     Vitals:   /71   Pulse 60   Temp 97.9 °F (36.6 °C)   Resp 16   Ht 5' 8" (1.727 m)   Wt 125 kg (276 lb)   SpO2 96%   BMI 41.97 kg/m²     Temp (24hrs), Av.5 °F (36.9 °C), Min:97.9 °F (36.6 °C), Max:99 °F (37.2 °C)      Physical Exam :     General:  Alert, cooperative, and in no distress. Lower extremity exam:  Neurologic, Vascular, musculoskeletal status at baseline    Dermatologic: No signs of infection bilateral lower extremities including erythema, heat, swelling, malodor, pain.   They are also to small wounds noted    Wound #: 1  Location: right lateral plantar foot  Length 10 cm: Width 6 cm: Depth unknown cm:   Deepest Tissue Noted in Base: Unknown   Peripheral Skin Description: Attached  Granulation: 70% Fibrotic Tissue: 0% Necrotic Tissue: 30%   Drainage Amount: minimal, serous  Signs of Infection: No       Wound #: 2  Location: left plantar foot  Length 9cm: Width 5cm: Depth 0.2cm:   Deepest Tissue Noted in Base: subcutaneous  Probe to Bone: No  Peripheral Skin Description: Attached  Granulation: 100% Fibrotic Tissue: 0% Necrotic Tissue: 0%   Drainage Amount: clear  Signs of Infection: No      Clinical Images 08/14/23: Additional Data:     Labs:    Results from last 7 days   Lab Units 08/13/23 0444 08/11/23  0454 08/10/23  0512   WBC Thousand/uL 14.52*   < > 11.62*   HEMOGLOBIN g/dL 9.0*   < > 8.6*   HEMATOCRIT % 30.5*   < > 28.4*   PLATELETS Thousands/uL 467*   < > 413*   NEUTROS PCT %  --   --  62   LYMPHS PCT %  --   --  25   MONOS PCT %  --   --  8   EOS PCT %  --   --  3    < > = values in this interval not displayed. Results from last 7 days   Lab Units 08/13/23 0444 08/09/23  0628 08/08/23  1302   POTASSIUM mmol/L 4.1   < > 3.5   CHLORIDE mmol/L 108   < > 101   CO2 mmol/L 27   < > 27   BUN mg/dL 21   < > 19   CREATININE mg/dL 0.94   < > 0.85   CALCIUM mg/dL 8.5   < > 9.3   ALK PHOS U/L  --   --  132*   ALT U/L  --   --  7   AST U/L  --   --  10*    < > = values in this interval not displayed. Results from last 7 days   Lab Units 08/08/23  2251   INR  1.13       * I Have Reviewed All Lab Data Listed Above. Recent Cultures (last 7 days):     Results from last 7 days   Lab Units 08/09/23  0945 08/08/23  2251   BLOOD CULTURE   --  No Growth After 5 Days. No Growth After 5 Days. GRAM STAIN RESULT  3+ Polys*  2+ Gram positive rods*  --    WOUND CULTURE  4+ Growth of  --      Results from last 7 days   Lab Units 08/09/23 0945   ANAEROBIC CULTURE  No anaerobes isolated       Imaging: I have personally reviewed pertinent films in PACS  XR foot 3+ vw right    Result Date: 8/9/2023  Impression: No radiographic evidence of osteomyelitis. Workstation performed: [id number]     XR foot 3+ vw left    Result Date: 8/9/2023  Impression: No radiographic evidence of osteomyelitis.  Workstation performed: [id number]     CTA dissection protocol chest abdomen pelvis w wo contrast    Result Date: 8/8/2023  Impression: No thoracic aortic dissection Bilateral pulmonary embolism with pulmonary emboli in the right main pulmonary artery, right lower lobe segmental subsegmental vessels and left lower lobe pulmonary artery. Consider follow-up CTA at 3 months to demonstrate resolution RV LV ratio is less than 0.9 Ventral abdominal wall hernia Rounded nodular density seen in the outer left breast measuring 1.3 cm probably due to intramammary lymph nodes, correlate with mammography I personally discussed this study with Suly Martin on 8/8/2023 4:05 PM. Workstation performed: [id number]     XR chest 1 view portable    Result Date: 8/8/2023  Impression: No acute cardiopulmonary disease. Workstation performed: [id number]     EKG, Pathology, and Other Studies: I have personally reviewed pertinent reports.     Active medications:  Current Facility-Administered Medications   Medication Dose Route Frequency   • acetaminophen (TYLENOL) tablet 650 mg  650 mg Oral Q6H PRN   • amLODIPine (NORVASC) tablet 5 mg  5 mg Oral Daily   • apixaban (ELIQUIS) tablet 10 mg  10 mg Oral BID    Followed by   • [START ON 8/17/2023] apixaban (ELIQUIS) tablet 5 mg  5 mg Oral BID   • aspirin (ECOTRIN LOW STRENGTH) EC tablet 81 mg  81 mg Oral Daily   • atorvastatin (LIPITOR) tablet 40 mg  40 mg Oral Daily With Dinner   • budesonide-formoterol (SYMBICORT) 160-4.5 mcg/act inhaler 1 puff  1 puff Inhalation BID   • ceFAZolin (ANCEF) IVPB (premix in dextrose) 2,000 mg 50 mL  2,000 mg Intravenous Q8H   • cholecalciferol (VITAMIN D3) tablet 2,000 Units  2,000 Units Oral Daily   • docusate sodium (COLACE) capsule 100 mg  100 mg Oral BID   • furosemide (LASIX) tablet 20 mg  20 mg Oral BID   • gabapentin (NEURONTIN) capsule 100 mg  100 mg Oral BID   • hydrALAZINE (APRESOLINE) tablet 10 mg  10 mg Oral TID   • insulin glargine (LANTUS) subcutaneous injection 25 Units 0.25 mL  25 Units Subcutaneous Q12H [facility]   • insulin lispro (HumaLOG) 100 units/mL subcutaneous injection 1-5 Units  1-5 Units Subcutaneous HS   • insulin lispro (HumaLOG) 100 units/mL subcutaneous injection 1-6 Units  1-6 Units Subcutaneous TID AC   • insulin lispro (HumaLOG) 100 units/mL subcutaneous injection 7 Units  7 Units Subcutaneous TID With Meals   • loperamide (IMODIUM) capsule 2 mg  2 mg Oral 4x Daily PRN   • losartan (COZAAR) tablet 100 mg  100 mg Oral Daily   • menthol-methyl salicylate (BENGAY) 68-20 % cream   Apply externally 4x Daily PRN   • metoprolol tartrate (LOPRESSOR) tablet 100 mg  100 mg Oral Q12H [facility]   • ondansetron (ZOFRAN) injection 4 mg  4 mg Intravenous Q6H PRN   • predniSONE tablet 4 mg  4 mg Oral Daily   • topiramate (TOPAMAX) tablet 100 mg  100 mg Oral HS         ** Please Note: Portions of the record may have been created with voice recognition software. Occasional wrong word or "sound a like" substitutions may have occurred due to the inherent limitations of voice recognition software. Read the chart carefully and recognize, using context, where substitutions have occurred.  **

## 2023-08-14 NOTE — PLAN OF CARE
Problem: OCCUPATIONAL THERAPY ADULT  Goal: Performs self-care activities at highest level of function for planned discharge setting. See evaluation for individualized goals. Description: Treatment Interventions: ADL retraining, Functional transfer training, UE strengthening/ROM, Endurance training, Patient/family training, Equipment evaluation/education, Neuromuscular reeducation, Compensatory technique education, Energy conservation, Activityengagement          See flowsheet documentation for full assessment, interventions and recommendations. Note: Limitation: Decreased ADL status, Decreased UE strength, Decreased Safe judgement during ADL, Decreased UE ROM, Decreased endurance, Decreased self-care trans  Prognosis: Good  Assessment: Pt seen for 38 min tx session with focus on functional balance, functional mobility, ADL status, transfer safety, and cognition. Per orders, pt is NWB b/l LE's, but per podiatry progress noted, allowed WBAT b/l LE for transfers only; pt aware of precautions. Pt able to tolerate OOB mobility; sitting balance=f+/f, standing balance=f-/p+. Pt required verbal/physical assistance to maintain transfer safety. Pt demonstrating need for assistance with her LE ADLs. Pt able to demonstrate good b/l UE AROM throughout. Slight cognitive deficits noted(i.e.memory, problem-solving). Pt continues to demonstrate appropriateness for inpt rehab to improve her overall level of independence. Will continue. The patient's raw score on the AM-PAC Daily Activity Inpatient Short Form is 16. A raw score of less than 19 suggests the patient may benefit from discharge to post-acute rehabilitation services. Please refer to the recommendation of the Occupational Therapist for safe discharge planning.      OT Discharge Recommendation: Post acute rehabilitation services

## 2023-08-15 VITALS
OXYGEN SATURATION: 99 % | TEMPERATURE: 97.9 F | RESPIRATION RATE: 16 BRPM | SYSTOLIC BLOOD PRESSURE: 138 MMHG | HEART RATE: 59 BPM | WEIGHT: 276 LBS | DIASTOLIC BLOOD PRESSURE: 71 MMHG | BODY MASS INDEX: 41.83 KG/M2 | HEIGHT: 68 IN

## 2023-08-15 LAB
ANION GAP SERPL CALCULATED.3IONS-SCNC: 5 MMOL/L
BUN SERPL-MCNC: 24 MG/DL (ref 5–25)
CALCIUM SERPL-MCNC: 8.7 MG/DL (ref 8.4–10.2)
CHLORIDE SERPL-SCNC: 108 MMOL/L (ref 96–108)
CO2 SERPL-SCNC: 26 MMOL/L (ref 21–32)
CREAT SERPL-MCNC: 0.8 MG/DL (ref 0.6–1.3)
ERYTHROCYTE [DISTWIDTH] IN BLOOD BY AUTOMATED COUNT: 16.4 % (ref 11.6–15.1)
GFR SERPL CREATININE-BSD FRML MDRD: 78 ML/MIN/1.73SQ M
GLUCOSE SERPL-MCNC: 103 MG/DL (ref 65–140)
GLUCOSE SERPL-MCNC: 108 MG/DL (ref 65–140)
GLUCOSE SERPL-MCNC: 129 MG/DL (ref 65–140)
HCT VFR BLD AUTO: 30.5 % (ref 34.8–46.1)
HGB BLD-MCNC: 9.2 G/DL (ref 11.5–15.4)
MCH RBC QN AUTO: 24.1 PG (ref 26.8–34.3)
MCHC RBC AUTO-ENTMCNC: 30.2 G/DL (ref 31.4–37.4)
MCV RBC AUTO: 80 FL (ref 82–98)
PLATELET # BLD AUTO: 457 THOUSANDS/UL (ref 149–390)
PMV BLD AUTO: 10.2 FL (ref 8.9–12.7)
POTASSIUM SERPL-SCNC: 3.6 MMOL/L (ref 3.5–5.3)
RBC # BLD AUTO: 3.81 MILLION/UL (ref 3.81–5.12)
SODIUM SERPL-SCNC: 139 MMOL/L (ref 135–147)
WBC # BLD AUTO: 15.64 THOUSAND/UL (ref 4.31–10.16)

## 2023-08-15 PROCEDURE — 99239 HOSP IP/OBS DSCHRG MGMT >30: CPT | Performed by: INTERNAL MEDICINE

## 2023-08-15 PROCEDURE — 85027 COMPLETE CBC AUTOMATED: CPT

## 2023-08-15 PROCEDURE — 82948 REAGENT STRIP/BLOOD GLUCOSE: CPT

## 2023-08-15 PROCEDURE — 80048 BASIC METABOLIC PNL TOTAL CA: CPT

## 2023-08-15 RX ORDER — LANOLIN ALCOHOL/MO/W.PET/CERES
CREAM (GRAM) TOPICAL 2 TIMES DAILY
Qty: 240 ML | Refills: 0
Start: 2023-08-15 | End: 2023-09-14

## 2023-08-15 RX ORDER — MUSCLE RUB CREAM 100; 150 MG/G; MG/G
CREAM TOPICAL 4 TIMES DAILY PRN
Refills: 0
Start: 2023-08-15

## 2023-08-15 RX ADMIN — PREDNISONE 4 MG: 1 TABLET ORAL at 08:23

## 2023-08-15 RX ADMIN — LOSARTAN POTASSIUM 100 MG: 50 TABLET, FILM COATED ORAL at 08:23

## 2023-08-15 RX ADMIN — BUDESONIDE AND FORMOTEROL FUMARATE DIHYDRATE 1 PUFF: 160; 4.5 AEROSOL RESPIRATORY (INHALATION) at 08:24

## 2023-08-15 RX ADMIN — FUROSEMIDE 20 MG: 20 TABLET ORAL at 08:23

## 2023-08-15 RX ADMIN — METOPROLOL TARTRATE 100 MG: 100 TABLET, FILM COATED ORAL at 08:23

## 2023-08-15 RX ADMIN — INSULIN LISPRO 7 UNITS: 100 INJECTION, SOLUTION INTRAVENOUS; SUBCUTANEOUS at 12:37

## 2023-08-15 RX ADMIN — GABAPENTIN 100 MG: 100 CAPSULE ORAL at 08:23

## 2023-08-15 RX ADMIN — AMLODIPINE BESYLATE 5 MG: 5 TABLET ORAL at 08:23

## 2023-08-15 RX ADMIN — CEFAZOLIN SODIUM 2000 MG: 2 SOLUTION INTRAVENOUS at 07:41

## 2023-08-15 RX ADMIN — INSULIN LISPRO 7 UNITS: 100 INJECTION, SOLUTION INTRAVENOUS; SUBCUTANEOUS at 08:25

## 2023-08-15 RX ADMIN — INSULIN GLARGINE 25 UNITS: 100 INJECTION, SOLUTION SUBCUTANEOUS at 08:23

## 2023-08-15 RX ADMIN — DOCUSATE SODIUM 100 MG: 100 CAPSULE, LIQUID FILLED ORAL at 08:23

## 2023-08-15 RX ADMIN — APIXABAN 10 MG: 5 TABLET, FILM COATED ORAL at 08:23

## 2023-08-15 RX ADMIN — Medication 2000 UNITS: at 08:23

## 2023-08-15 RX ADMIN — ASPIRIN 81 MG: 81 TABLET, COATED ORAL at 08:23

## 2023-08-15 RX ADMIN — HYDRALAZINE HYDROCHLORIDE 10 MG: 10 TABLET, FILM COATED ORAL at 08:23

## 2023-08-15 NOTE — PLAN OF CARE
Problem: Potential for Falls  Goal: Patient will remain free of falls  Description: INTERVENTIONS:  - Educate patient/family on patient safety including physical limitations  - Instruct patient to call for assistance with activity   - Consult OT/PT to assist with strengthening/mobility   - Keep Call bell within reach  - Keep bed low and locked with side rails adjusted as appropriate  - Keep care items and personal belongings within reach  - Initiate and maintain comfort rounds  - Make Fall Risk Sign visible to staff  - Offer Toileting every 2 Hours, in advance of need  - Obtain necessary fall risk management equipment: bed rails  - Apply yellow socks and bracelet for high fall risk patients  - Consider moving patient to room near nurses station  Outcome: Progressing     Problem: MOBILITY - ADULT  Goal: Maintain or return to baseline ADL function  Description: INTERVENTIONS:  -  Assess patient's ability to carry out ADLs; assess patient's baseline for ADL function and identify physical deficits which impact ability to perform ADLs (bathing, care of mouth/teeth, toileting, grooming, dressing, etc.)  - Assess/evaluate cause of self-care deficits   - Assess range of motion  - Assess patient's mobility; develop plan if impaired  - Assess patient's need for assistive devices and provide as appropriate  - Encourage maximum independence but intervene and supervise when necessary  - Involve family in performance of ADLs  - Assess for home care needs following discharge   - Consider OT consult to assist with ADL evaluation and planning for discharge  - Provide patient education as appropriate  Outcome: Progressing  Goal: Maintains/Returns to pre admission functional level  Description: INTERVENTIONS:  - Perform BMAT or MOVE assessment daily.   - Set and communicate daily mobility goal to care team and patient/family/caregiver.    - Collaborate with rehabilitation services on mobility goals if consulted  - Perform Range of Motion 2 times a day. - Reposition patient every 2 hours.   - Dangle patient 2 times a day  - Stand patient 2 times a day  - Ambulate patient 2 times a day  - Out of bed to chair 1 times a day   - Out of bed for meals 1 times a day  - Out of bed for toileting  - Record patient progress and toleration of activity level   Outcome: Progressing     Problem: PAIN - ADULT  Goal: Verbalizes/displays adequate comfort level or baseline comfort level  Description: Interventions:  - Encourage patient to monitor pain and request assistance  - Assess pain using appropriate pain scale  - Administer analgesics based on type and severity of pain and evaluate response  - Implement non-pharmacological measures as appropriate and evaluate response  - Consider cultural and social influences on pain and pain management  - Notify physician/advanced practitioner if interventions unsuccessful or patient reports new pain  Outcome: Progressing     Problem: INFECTION - ADULT  Goal: Absence or prevention of progression during hospitalization  Description: INTERVENTIONS:  - Assess and monitor for signs and symptoms of infection  - Monitor lab/diagnostic results  - Monitor all insertion sites, i.e. indwelling lines, tubes, and drains  - Monitor endotracheal if appropriate and nasal secretions for changes in amount and color  - [place] appropriate cooling/warming therapies per order  - Administer medications as ordered  - Instruct and encourage patient and family to use good hand hygiene technique  - Identify and instruct in appropriate isolation precautions for identified infection/condition  Outcome: Progressing  Goal: Absence of fever/infection during neutropenic period  Description: INTERVENTIONS:  - Monitor WBC    Outcome: Progressing     Problem: SAFETY ADULT  Goal: Patient will remain free of falls  Description: INTERVENTIONS:  - Educate patient/family on patient safety including physical limitations  - Instruct patient to call for assistance with activity   - Consult OT/PT to assist with strengthening/mobility   - Keep Call bell within reach  - Keep bed low and locked with side rails adjusted as appropriate  - Keep care items and personal belongings within reach  - Initiate and maintain comfort rounds  - Make Fall Risk Sign visible to staff  - Offer Toileting every 2 Hours, in advance of need  - Obtain necessary fall risk management equipment: bed rails  - Apply yellow socks and bracelet for high fall risk patients  - Consider moving patient to room near nurses station  Outcome: Progressing  Goal: Maintain or return to baseline ADL function  Description: INTERVENTIONS:  -  Assess patient's ability to carry out ADLs; assess patient's baseline for ADL function and identify physical deficits which impact ability to perform ADLs (bathing, care of mouth/teeth, toileting, grooming, dressing, etc.)  - Assess/evaluate cause of self-care deficits   - Assess range of motion  - Assess patient's mobility; develop plan if impaired  - Assess patient's need for assistive devices and provide as appropriate  - Encourage maximum independence but intervene and supervise when necessary  - Involve family in performance of ADLs  - Assess for home care needs following discharge   - Consider OT consult to assist with ADL evaluation and planning for discharge  - Provide patient education as appropriate  Outcome: Progressing  Goal: Maintains/Returns to pre admission functional level  Description: INTERVENTIONS:  - Perform BMAT or MOVE assessment daily.   - Set and communicate daily mobility goal to care team and patient/family/caregiver. - Collaborate with rehabilitation services on mobility goals if consulted  - Perform Range of Motion 2 times a day. - Reposition patient every 2 hours.   - Dangle patient 2 times a day  - Stand patient 2 times a day  - Ambulate patient 2 times a day  - Out of bed to chair 1 times a day   - Out of bed for meals 1 times a day  - Out of bed for toileting  - Record patient progress and toleration of activity level   Outcome: Progressing     Problem: DISCHARGE PLANNING  Goal: Discharge to home or other facility with appropriate resources  Description: INTERVENTIONS:  - Identify barriers to discharge w/patient and caregiver  - Arrange for needed discharge resources and transportation as appropriate  - Identify discharge learning needs (meds, wound care, etc.)  - Arrange for interpretive services to assist at discharge as needed  - Refer to Case Management Department for coordinating discharge planning if the patient needs post-hospital services based on physician/advanced practitioner order or complex needs related to functional status, cognitive ability, or social support system  Outcome: Progressing     Problem: Knowledge Deficit  Goal: Patient/family/caregiver demonstrates understanding of disease process, treatment plan, medications, and discharge instructions  Description: Complete learning assessment and assess knowledge base.   Interventions:  - Provide teaching at level of understanding  - Provide teaching via preferred learning methods  Outcome: Progressing     Problem: Prexisting or High Potential for Compromised Skin Integrity  Goal: Skin integrity is maintained or improved  Description: INTERVENTIONS:  - Identify patients at risk for skin breakdown  - Assess and monitor skin integrity  - Assess and monitor nutrition and hydration status  - Monitor labs   - Assess for incontinence   - Turn and reposition patient  - Assist with mobility/ambulation  - Relieve pressure over bony prominences  - Avoid friction and shearing  - Provide appropriate hygiene as needed including keeping skin clean and dry  - Evaluate need for skin moisturizer/barrier cream  - Collaborate with interdisciplinary team   - Patient/family teaching  - Consider wound care consult   Outcome: Progressing

## 2023-08-15 NOTE — PLAN OF CARE
Problem: Potential for Falls  Goal: Patient will remain free of falls  Description: INTERVENTIONS:  - Educate patient/family on patient safety including physical limitations  - Instruct patient to call for assistance with activity   - Consult OT/PT to assist with strengthening/mobility   - Keep Call bell within reach  - Keep bed low and locked with side rails adjusted as appropriate  - Keep care items and personal belongings within reach  - Initiate and maintain comfort rounds  - Make Fall Risk Sign visible to staff  - Offer Toileting every 2 Hours, in advance of need  - Initiate/Maintain bed alarm  - Obtain necessary fall risk management equipment:   - Apply yellow socks and bracelet for high fall risk patients  - Consider moving patient to room near nurses station  Outcome: Progressing     Problem: MOBILITY - ADULT  Goal: Maintain or return to baseline ADL function  Description: INTERVENTIONS:  -  Assess patient's ability to carry out ADLs; assess patient's baseline for ADL function and identify physical deficits which impact ability to perform ADLs (bathing, care of mouth/teeth, toileting, grooming, dressing, etc.)  - Assess/evaluate cause of self-care deficits   - Assess range of motion  - Assess patient's mobility; develop plan if impaired  - Assess patient's need for assistive devices and provide as appropriate  - Encourage maximum independence but intervene and supervise when necessary  - Involve family in performance of ADLs  - Assess for home care needs following discharge   - Consider OT consult to assist with ADL evaluation and planning for discharge  - Provide patient education as appropriate  Outcome: Progressing  Goal: Maintains/Returns to pre admission functional level  Description: INTERVENTIONS:  - Perform BMAT or MOVE assessment daily.   - Set and communicate daily mobility goal to care team and patient/family/caregiver.    - Collaborate with rehabilitation services on mobility goals if consulted  - Perform Range of Motion 3 times a day. - Reposition patient every 2 hours.   - Dangle patient 3 times a day  - Stand patient 3 times a day  - Ambulate patient 3 times a day  - Out of bed to chair 3 times a day   - Out of bed for meals 3 times a day  - Out of bed for toileting  - Record patient progress and toleration of activity level   Outcome: Progressing     Problem: PAIN - ADULT  Goal: Verbalizes/displays adequate comfort level or baseline comfort level  Description: Interventions:  - Encourage patient to monitor pain and request assistance  - Assess pain using appropriate pain scale  - Administer analgesics based on type and severity of pain and evaluate response  - Implement non-pharmacological measures as appropriate and evaluate response  - Consider cultural and social influences on pain and pain management  - Notify physician/advanced practitioner if interventions unsuccessful or patient reports new pain  Outcome: Progressing     Problem: INFECTION - ADULT  Goal: Absence or prevention of progression during hospitalization  Description: INTERVENTIONS:  - Assess and monitor for signs and symptoms of infection  - Monitor lab/diagnostic results  - Monitor all insertion sites, i.e. indwelling lines, tubes, and drains  - Monitor endotracheal if appropriate and nasal secretions for changes in amount and color  - [place] appropriate cooling/warming therapies per order  - Administer medications as ordered  - Instruct and encourage patient and family to use good hand hygiene technique  - Identify and instruct in appropriate isolation precautions for identified infection/condition  Outcome: Progressing  Goal: Absence of fever/infection during neutropenic period  Description: INTERVENTIONS:  - Monitor WBC    Outcome: Progressing     Problem: DISCHARGE PLANNING  Goal: Discharge to home or other facility with appropriate resources  Description: INTERVENTIONS:  - Identify barriers to discharge w/patient and caregiver  - Arrange for needed discharge resources and transportation as appropriate  - Identify discharge learning needs (meds, wound care, etc.)  - Arrange for interpretive services to assist at discharge as needed  - Refer to Case Management Department for coordinating discharge planning if the patient needs post-hospital services based on physician/advanced practitioner order or complex needs related to functional status, cognitive ability, or social support system  Outcome: Progressing     Problem: Prexisting or High Potential for Compromised Skin Integrity  Goal: Skin integrity is maintained or improved  Description: INTERVENTIONS:  - Identify patients at risk for skin breakdown  - Assess and monitor skin integrity  - Assess and monitor nutrition and hydration status  - Monitor labs   - Assess for incontinence   - Turn and reposition patient  - Assist with mobility/ambulation  - Relieve pressure over bony prominences  - Avoid friction and shearing  - Provide appropriate hygiene as needed including keeping skin clean and dry  - Evaluate need for skin moisturizer/barrier cream  - Collaborate with interdisciplinary team   - Patient/family teaching  - Consider wound care consult   Outcome: Progressing

## 2023-08-15 NOTE — CASE MANAGEMENT
Case Management Discharge Planning Note    Patient name Gerald Castañeda  Location [place] 2 /[place] 2 Julio Cesar Forde* MRN [id number]  : 1960 Date 8/15/2023               OBJECTIVE:  Risk of Unplanned Readmission Score: 50.2         Current admission status: Inpatient   Preferred Pharmacy:   [facility] [address], [address] [place] [id number]  Phone: [phone] Fax: [phone]    [address] [place] [address],  [facility],  [address] [place] [address]  Phone: [phone] Fax: [phone]    Primary Care Provider: Stephanie Castro MD    Primary Insurance: [facility] DUAL COMPLETE [address] REP  Secondary Insurance: [address] DETAILS:                                                                                                 Additional Comments: Request made to attending to call insurance and speak to medical director prior to his determination being rendered for STR. Attending aware and willing to do same.

## 2023-08-15 NOTE — CASE MANAGEMENT
Case Management Discharge Planning Note    Patient name Cheryl Monahan  Location [facility] 2 /South 2 Kosta Markham* MRN [id number]  : 1960 Date 8/15/2023               OBJECTIVE:  Risk of Unplanned Readmission Score: 49.96         Current admission status: Inpatient   Preferred Pharmacy:   [facility] [address], [address]  [address] [facility] [address]  Phone: [phone] Fax: [phone]    [address] [place] [address] [address],  [address],  [address] [address]  Phone: [phone] Fax: [phone]    Primary Care Provider: Deja Martin MD    Primary Insurance: [facility] REP  Secondary Insurance: [facility]                                                    Treatment Team Recommendation: Short Term Rehab  Discharge Destination Plan[de-identified] SNF, Short Term Rehab  Transport at Discharge : Wheelchair van  Dispatcher Contacted: Yes  Number/Name of Dispatcher: Roundtrip  Transported by [employer] and Unit #): [employer]  ETA of Transport (Date): 08/15/23  ETA of Transport (Time): [address] Name, [address] [facility] [address] : [address] [facility] Acute  Receiving Facility/Agency Phone Number: [phone]  Facility/Agency Fax Number: [facility] Number: Per Estevan Brunner at [employer] Evangelista Puri ref# [id number]) Pt is authorized under [place] [id number] for 3D from 8/15 to  with NRD  to [employer] (F) [phone]

## 2023-08-15 NOTE — DISCHARGE SUMMARY
Discharge Summary - Rahul Saavedra [birth year], [id number]        Admission Date: 8/8/2023  Discharge Date: 8/15/2023      Discharge Diagnosis:   1. Bilateral pulmonary embolism  2. Left DVT  3. Diabetic foot ulcer  4. Type 2 diabetes, poorly controlled  5. Polymyalgia rheumatica  6. QT prolongation  7. Severe obesity    Consulting Physicians:  1. Dr. Cuba Layton, podiatry    Procedures Performed:   None    HPI: The patient is a 70-year-old woman who came to the emergency room with a right foot wound but also shortness of breath. She said that she injured her foot when it got caught on her wheelchair trying to get off a bus. More recently however she became short of breath. She denied any chest pain. She had no hemoptysis or fever. Evaluation in the emergency room confirmed pulmonary emboli. She was admitted for further care. Hospital Course: The patient was admitted to the hospital and started on intravenous heparin. Thereafter, she was converted to apixaban. The patient's respiratory status remained stable throughout her hospitalization. Venous duplex examination was performed which showed a left leg DVT, undoubtedly the source of her pulmonary embolism. The patient had recently injured her leg but it is not clear that the level of trauma involved to be sufficient to provoke her current difficulties. This, coupled with the patient's overall sedentary lifestyle would make me favor long-term anticoagulation. Obviously, the risk-benefit ratio of this recommendation will have to be continually reassessed. The patient had an ulcer on her right foot related to her recent injury. She was evaluated by podiatry and local care was recommended. No surgical intervention was needed. The patient's diabetic control has been poor. Her medications were adjusted during her stay. The patient has a history of polymyalgia rheumatica and is on low-dose prednisone for this.     Overall, the patient's functional status deteriorated over the past week or so. She was not able to transfer well from bed to a wheelchair. Because of this, short-term rehab was recommended. At the time of discharge the patient was feeling pretty well. Vital signs were stable. Lungs were clear. Cardiac exam revealed a regular rhythm. The abdomen was soft and nontender. There was no edema. Disposition: The patient was transferred to [facility] on August 15. She was asked to continue with diabetic diet. Her activity will be as tolerated. She will be under the care of the physicians at that institution during her stay there. It is anticipated that she will resume primary care with Dr. Rubia Nair at the time of her discharge. Discharge instructions/Information to patient and family:   See after visit summary for information provided to patient and family. Provisions for Follow-Up Care:  See after visit summary for information related to follow-up care and any pertinent home health orders. Planned Readmission: No    Discharge Statement   I spent 45 minutes discharging the patient. This time was spent on the day of discharge. I had direct contact with the patient on the day of discharge. Discharge Medications:  See after visit summary for reconciled discharge medications provided to patient and family.

## 2023-08-15 NOTE — PLAN OF CARE
Problem: Potential for Falls  Goal: Patient will remain free of falls  Description: INTERVENTIONS:  - Educate patient/family on patient safety including physical limitations  - Instruct patient to call for assistance with activity   - Consult OT/PT to assist with strengthening/mobility   - Keep Call bell within reach  - Keep bed low and locked with side rails adjusted as appropriate  - Keep care items and personal belongings within reach  - Initiate and maintain comfort rounds  - Make Fall Risk Sign visible to staff  - Offer Toileting every 2 Hours, in advance of need  - Initiate/Maintain bed alarm  - Obtain necessary fall risk management equipment:   - Apply yellow socks and bracelet for high fall risk patients  - Consider moving patient to room near nurses station  8/15/2023 1546 by Cristopher Kemp RN  Outcome: Adequate for Discharge  8/15/2023 1026 by Cristopher Kemp RN  Outcome: Progressing     Problem: MOBILITY - ADULT  Goal: Maintain or return to baseline ADL function  Description: INTERVENTIONS:  -  Assess patient's ability to carry out ADLs; assess patient's baseline for ADL function and identify physical deficits which impact ability to perform ADLs (bathing, care of mouth/teeth, toileting, grooming, dressing, etc.)  - Assess/evaluate cause of self-care deficits   - Assess range of motion  - Assess patient's mobility; develop plan if impaired  - Assess patient's need for assistive devices and provide as appropriate  - Encourage maximum independence but intervene and supervise when necessary  - Involve family in performance of ADLs  - Assess for home care needs following discharge   - Consider OT consult to assist with ADL evaluation and planning for discharge  - Provide patient education as appropriate  8/15/2023 1546 by Cristopher Kemp RN  Outcome: Adequate for Discharge  8/15/2023 1026 by Cristopher Kemp RN  Outcome: Progressing  Goal: Maintains/Returns to pre admission functional level  Description: INTERVENTIONS:  - Perform BMAT or MOVE assessment daily.   - Set and communicate daily mobility goal to care team and patient/family/caregiver. - Collaborate with rehabilitation services on mobility goals if consulted  - Perform Range of Motion 3 times a day. - Reposition patient every 2 hours.   - Dangle patient 3 times a day  - Stand patient 3 times a day  - Ambulate patient 3 times a day  - Out of bed to chair 3 times a day   - Out of bed for meals 3 times a day  - Out of bed for toileting  - Record patient progress and toleration of activity level   8/15/2023 1546 by Silke Khan RN  Outcome: Adequate for Discharge  8/15/2023 1026 by Silke Khan RN  Outcome: Progressing     Problem: PAIN - ADULT  Goal: Verbalizes/displays adequate comfort level or baseline comfort level  Description: Interventions:  - Encourage patient to monitor pain and request assistance  - Assess pain using appropriate pain scale  - Administer analgesics based on type and severity of pain and evaluate response  - Implement non-pharmacological measures as appropriate and evaluate response  - Consider cultural and social influences on pain and pain management  - Notify physician/advanced practitioner if interventions unsuccessful or patient reports new pain  8/15/2023 1546 by Silke Khan RN  Outcome: Adequate for Discharge  8/15/2023 1026 by Silke Khan RN  Outcome: Progressing     Problem: INFECTION - ADULT  Goal: Absence or prevention of progression during hospitalization  Description: INTERVENTIONS:  - Assess and monitor for signs and symptoms of infection  - Monitor lab/diagnostic results  - Monitor all insertion sites, i.e. indwelling lines, tubes, and drains  - Monitor endotracheal if appropriate and nasal secretions for changes in amount and color  - Norris appropriate cooling/warming therapies per order  - Administer medications as ordered  - Instruct and encourage patient and family to use good hand hygiene technique  - Identify and instruct in appropriate isolation precautions for identified infection/condition  8/15/2023 1546 by Erika Capone RN  Outcome: Adequate for Discharge  8/15/2023 1026 by Erika Capone RN  Outcome: Progressing  Goal: Absence of fever/infection during neutropenic period  Description: INTERVENTIONS:  - Monitor WBC    8/15/2023 1546 by Erika Capone RN  Outcome: Adequate for Discharge  8/15/2023 1026 by Erika Capone RN  Outcome: Progressing     Problem: SAFETY ADULT  Goal: Patient will remain free of falls  Description: INTERVENTIONS:  - Educate patient/family on patient safety including physical limitations  - Instruct patient to call for assistance with activity   - Consult OT/PT to assist with strengthening/mobility   - Keep Call bell within reach  - Keep bed low and locked with side rails adjusted as appropriate  - Keep care items and personal belongings within reach  - Initiate and maintain comfort rounds  - Make Fall Risk Sign visible to staff  - Offer Toileting every 3 Hours, in advance of need  - Initiate/Maintain bed alarm  - Obtain necessary fall risk management equipment:   - Apply yellow socks and bracelet for high fall risk patients  - Consider moving patient to room near nurses station  8/15/2023 1546 by Erika Capone RN  Outcome: Adequate for Discharge  8/15/2023 1026 by Erika Capone RN  Outcome: Progressing  Goal: Maintain or return to baseline ADL function  Description: INTERVENTIONS:  -  Assess patient's ability to carry out ADLs; assess patient's baseline for ADL function and identify physical deficits which impact ability to perform ADLs (bathing, care of mouth/teeth, toileting, grooming, dressing, etc.)  - Assess/evaluate cause of self-care deficits   - Assess range of motion  - Assess patient's mobility; develop plan if impaired  - Assess patient's need for assistive devices and provide as appropriate  - Encourage maximum independence but intervene and supervise when necessary  - Involve family in performance of ADLs  - Assess for home care needs following discharge   - Consider OT consult to assist with ADL evaluation and planning for discharge  - Provide patient education as appropriate  8/15/2023 1546 by Mathew Jacinto RN  Outcome: Adequate for Discharge  8/15/2023 1026 by Mathew Jacinto RN  Outcome: Progressing  Goal: Maintains/Returns to pre admission functional level  Description: INTERVENTIONS:  - Perform BMAT or MOVE assessment daily.   - Set and communicate daily mobility goal to care team and patient/family/caregiver. - Collaborate with rehabilitation services on mobility goals if consulted  - Perform Range of Motion 3 times a day. - Reposition patient every 2 hours. - Dangle patient 3 times a day  - Stand patient 3 times a day  - Ambulate patient 3 times a day  - Out of bed to chair 3 times a day   - Out of bed for meals 3 times a day  - Out of bed for toileting  - Record patient progress and toleration of activity level   8/15/2023 1546 by Mathew Jacinto RN  Outcome: Adequate for Discharge  8/15/2023 1026 by Mathew Jacinto RN  Outcome: Progressing     Problem: Knowledge Deficit  Goal: Patient/family/caregiver demonstrates understanding of disease process, treatment plan, medications, and discharge instructions  Description: Complete learning assessment and assess knowledge base.   Interventions:  - Provide teaching at level of understanding  - Provide teaching via preferred learning methods  8/15/2023 1546 by Mathew Jacinto RN  Outcome: Adequate for Discharge  8/15/2023 1026 by Mathew Jacinto RN  Outcome: Progressing

## 2023-08-15 NOTE — NURSING NOTE
Patient discharged to [facility] post acute. Discharge instructions were reviewed with patient. Report was called to [facility] @ receiving facility. Patient was transported via wheelchair Rosalinda @ time of d/c.

## 2023-08-16 ENCOUNTER — TRANSITIONAL CARE MANAGEMENT (OUTPATIENT)
Dept: FAMILY MEDICINE CLINIC | Facility: CLINIC | Age: 63
End: 2023-08-16

## 2023-08-16 NOTE — UTILIZATION REVIEW
NOTIFICATION OF ADMISSION DISCHARGE   This is a Notification of Discharge from [facility]. Please be advised that this patient has been discharge from our facility. Below you will find the admission and discharge date and time including the patient’s disposition. UTILIZATION REVIEW CONTACT:  Kathy Holt MA  Utilization   Network Utilization Review Department  Phone: [phone] x carefully listen to the prompts. All voicemails are confidential.  Email: [email or web address]     ADMISSION INFORMATION  PRESENTATION DATE: 8/8/2023 11:36 AM  OBERVATION ADMISSION DATE:   INPATIENT ADMISSION DATE: 8/8/23  4:27 PM   DISCHARGE DATE: 8/15/2023  3:50 PM   DISPOSITION:Non [facility] SNF/TCU/SNU    IMPORTANT INFORMATION:  Send all requests for admission clinical reviews, approved or denied determinations and any other requests to dedicated fax number below belonging to the campus where the patient is receiving treatment.  List of dedicated fax numbers:  [place] DENIALS (Administrative/Medical Necessity) [phone] [address] [facility] (Maternity/NICU/Pediatrics) [phone]   [facility] [phone]   [facility] [phone] [address] [phone]   [address] [place] [address] [phone]   [facility] [phone]   [address] [facility] [address] [facility] [phone]   [address] [place] [address] [phone]   [address] [facility] [phone]   [address] [facility] [address] [phone]

## 2023-08-24 ENCOUNTER — PATIENT OUTREACH (OUTPATIENT)
Dept: FAMILY MEDICINE CLINIC | Facility: CLINIC | Age: 63
End: 2023-08-24

## 2023-08-24 NOTE — PROGRESS NOTES
Completed chart review, pt was d/c to [address] [place] [address]. MITZI CM will closed this referral and will remains available for future consult.

## 2023-08-29 ENCOUNTER — TELEPHONE (OUTPATIENT)
Dept: FAMILY MEDICINE CLINIC | Facility: CLINIC | Age: 63
End: 2023-08-29

## 2023-08-29 NOTE — TELEPHONE ENCOUNTER
Please schedule TCM appointment. Verify how patient is feeling and if they are in need of anything before their visit. Please send appt date and patient questions/concerns to Tommy to complete TCM.   It can be Virtual

## 2023-09-11 ENCOUNTER — OFFICE VISIT (OUTPATIENT)
Dept: WOUND CARE | Facility: CLINIC | Age: 63
End: 2023-09-11
Payer: COMMERCIAL

## 2023-09-11 VITALS
TEMPERATURE: 97.5 F | SYSTOLIC BLOOD PRESSURE: 138 MMHG | RESPIRATION RATE: 18 BRPM | DIASTOLIC BLOOD PRESSURE: 72 MMHG | WEIGHT: 249 LBS | BODY MASS INDEX: 37.74 KG/M2 | HEART RATE: 76 BPM | HEIGHT: 68 IN

## 2023-09-11 DIAGNOSIS — L97.522 ULCER OF LEFT FOOT, WITH FAT LAYER EXPOSED (HCC): ICD-10-CM

## 2023-09-11 DIAGNOSIS — E11.40 TYPE 2 DIABETES MELLITUS WITH DIABETIC NEUROPATHY, WITH LONG-TERM CURRENT USE OF INSULIN (HCC): ICD-10-CM

## 2023-09-11 DIAGNOSIS — L97.512 ULCER OF RIGHT FOOT, WITH FAT LAYER EXPOSED (HCC): Primary | ICD-10-CM

## 2023-09-11 DIAGNOSIS — Z79.4 TYPE 2 DIABETES MELLITUS WITH DIABETIC NEUROPATHY, WITH LONG-TERM CURRENT USE OF INSULIN (HCC): ICD-10-CM

## 2023-09-11 DIAGNOSIS — L97.412 ULCER OF RIGHT MIDFOOT WITH FAT LAYER EXPOSED (HCC): ICD-10-CM

## 2023-09-11 PROCEDURE — 11042 DBRDMT SUBQ TIS 1ST 20SQCM/<: CPT | Performed by: PODIATRIST

## 2023-09-11 PROCEDURE — 99214 OFFICE O/P EST MOD 30 MIN: CPT | Performed by: PODIATRIST

## 2023-09-11 RX ORDER — FLUTICASONE PROPIONATE AND SALMETEROL 250; 50 UG/1; UG/1
1 POWDER RESPIRATORY (INHALATION) 2 TIMES DAILY
Status: ON HOLD | COMMUNITY

## 2023-09-11 NOTE — PROGRESS NOTES
Patient ID: Gt Espinal is a 57 y.o. female Date of Birth 1960       Chief Complaint   Patient presents with   • New Patient Visit     Feet wounds        Allergies:  Patient has no known allergies. Assessments:      Diagnosis ICD-10-CM Associated Orders   1. Ulcer of right foot, with fat layer exposed ([address])  L97.512 Wound cleansing and dressings     Debridement      2. Ulcer of right midfoot with fat layer exposed ([address])  L97.412       3. Ulcer of left foot, with fat layer exposed ([address])  L97.522 Wound cleansing and dressings      4. Type 2 diabetes mellitus with diabetic neuropathy, with long-term current use of insulin ([facility])  E11.40 Debridement    Z79.4                Plan:  1. Reviewed medical records. Reviewed the podiatry notes from previous admission. Reviewed recent arterial study. Reviewed X-ray of her feet. Patient was counseled and educated on the condition and the diagnosis. 2. The diagnosis, treatment options and prognosis were discussed with the patient. 3. She has non-healing diabetic foot ulcer on both feet without signs of infection. Start Santyl daily. 4. Stressed on patient compliance about proper off-loading, and staying off of foot to reduce the risk of infection, non-healing, and limb loss. 5. Consider repeat arterial study depending on the progress. 6. Patient will return in 2 weeks for re-evaluation. Imaging: I have personally reviewed pertinent films in PACS  Labs, pathology, and Other Studies: I have personally reviewed pertinent reports. Subjective:   HPI  The patient was referred to our center for evaluation and treatment of wound in both feet. She had wounds since Feb.  She was in hospital last month and is in rehab right now. No significant pain. She has diabetes since 2005. Numbness in her feet with neuropathy. No acute illness.     The following portions of the patient's history were reviewed and updated as appropriate:   Patient Active Problem List   Diagnosis   • Uncontrolled type 2 diabetes mellitus with hyperglycemia ([facility])   • Seizures ([facility])   • Exertional dyspnea   • Enlarged pulmonary artery ([facility])   • Aortic dilatation ([facility])   • Anemia   • Decreased pulses in feet   • Hyperlipidemia   • Microalbuminuria   • Neuropathy of hand, right   • QT prolongation   • Visual impairment in both eyes   • Vitamin D deficiency   • Lung nodules   • Orthostatic hypotension   • Mild intermittent asthma without complication   • Type 2 diabetes mellitus with microalbuminuria, with long-term current use of insulin ([facility])   • Other inflammatory and immune myopathies, not elsewhere classified   • Morbid obesity ([facility])   • Polyneuropathy associated with underlying disease ([address])   • PMR (polymyalgia rheumatica) ([facility])   • Thrombocytosis   • Left cavernous carotid aneurysm   • Type 2 diabetes mellitus with hyperglycemia, with long-term current use of insulin ([facility])   • Aneurysm ([facility])   • Thyroid nodule   • History of absence seizures   • Hypersomnia   • Cerebral aneurysm without rupture   • Chronic migraine without aura without status migrainosus, not intractable   • Hypertension   • Depressed mood   • Blurry vision, bilateral   • Ulnar neuropathy of right upper extremity   • Polymyalgia rheumatica ([facility])   • Gait instability   • Stage 3a chronic kidney disease ([facility])   • Obstructive sleep apnea   • Diabetic neuropathy ([facility])   • Unsteadiness on feet   • Abnormal urinalysis   • Weakness   • Intertrigo   • Leg numbness   • Unsheltered homelessness   • Bilateral lower extremity edema   • Diarrhea   • Dyslipidemia   • Insulin long-term use ([facility])   • Type 2 diabetes mellitus with hyperglycemia ([facility])   • Ambulatory dysfunction   • Legally blind   • Suspected pressure injury of deep tissue   • Calculus of gallbladder without cholecystitis without obstruction   • Sepsis ([address])   • Diabetes mellitus ([address])   • Abnormal CT scan   • Friction blisters of the soles   • Leukocytosis   • Encounter for support and coordination of transition of care   • Food insecurity   • Homeless   • Ulcer of left heel (HCC)   • Pulmonary embolism (HCC)   • Diabetic foot ulcer ([address])     Past Medical History:   Diagnosis Date   • Anemia    • Benign hypertension     Procedure/Onset: 01/01/2005; Description: BP elevated today. Pt reports running out of BP meds 2wks ago. Will resume BP meds.    • Diabetes mellitus ([address])    • Diabetes mellitus type 2 with complications, uncontrolled 9/8/2015   • Dyspnea on exertion    • Hyperlipidemia    • Hypertension    • Legally blind 2010   • Miscarriage     x 3   • Polymyalgia rheumatica ([address]) 11/24/2021   • Seizures ([address])    • Sickle cell trait ([address])    • Stage 3a chronic kidney disease ([address]) 5/19/2022   • Thyroid nodule 3/6/2020     Past Surgical History:   Procedure Laterality Date   • CATARACT EXTRACTION Bilateral     august and september   • EYE SURGERY     • HYSTERECTOMY      37   • OOPHORECTOMY Left     42   • MA LIGATION/BIOPSY TEMPORAL ARTERY Bilateral 10/17/2019    Procedure: BIOPSY ARTERY TEMPORAL;  Surgeon: Teddy Cota DO;  Location: [facility];  Service: Vascular   • US GUIDED THYROID BIOPSY  5/13/2020     Family History   Problem Relation Age of Onset   • Heart attack Mother    • Heart disease Mother    • Hypertension Mother    • No Known Problems Father    • Heart disease Sister    • No Known Problems Brother    • No Known Problems Son    • No Known Problems Daughter    • Heart attack Maternal Grandmother    • Heart disease Maternal Grandmother    • Diabetes Other    • Heart attack Other    • No Known Problems Sister    • No Known Problems Sister    • No Known Problems Paternal Aunt    • No Known Problems Son    • Cancer Neg Hx    • Stroke Neg Hx       Social History     Socioeconomic History   • Marital status: /Civil Union     Spouse name: None   • Number of children: None   • Years of education: None   • Highest education level: None   Occupational History   • Occupation: long term disability   Tobacco Use   • Smoking status: Never   • Smokeless tobacco: Never   Vaping Use   • Vaping Use: Never used   Substance and Sexual Activity   • Alcohol use: Never     Alcohol/week: 0.0 standard drinks of alcohol   • Drug use: No   • Sexual activity: Not Currently     Partners: Male     Birth control/protection: None   Other Topics Concern   • None   Social History Narrative    Caffeine use    Resides with spouse and one son     Social Determinants of Health     Financial Resource Strain: Low Risk  (9/15/2020)    Overall Financial Resource Strain (CARDIA)    • Difficulty of Paying Living Expenses: Not very hard   Food Insecurity: Food Insecurity Present (5/8/2023)    Hunger Vital Sign    • Worried About Running Out of Food in the Last Year: Often true    • Ran Out of Food in the Last Year: Often true   Transportation Needs: Unmet Transportation Needs (5/8/2023)    PRAPARE - Transportation    • Lack of Transportation (Medical): Yes    • Lack of Transportation (Non-Medical): Yes   Physical Activity: Inactive (2/3/2020)    Exercise Vital Sign    • Days of Exercise per Week: 0 days    • Minutes of Exercise per Session: 0 min   Stress: No Stress Concern Present (2/3/2020)    [address] [place] [address]    • Feeling of Stress :  Only a little   Social Connections: Unknown (2/3/2020)    Social Connection and Isolation Panel [NHANES]    • Frequency of Communication with Friends and Family: Twice a week    • Frequency of Social Gatherings with Friends and Family: Twice a week    • Attends Congregational Services: Not on file    • Active Member of Clubs or Organizations: Not on file    • Attends Club or Organization Meetings: Not on file    • Marital Status:    Intimate Partner Violence: Not on file   Housing Stability: High Risk (5/8/2023)    Housing Stability Vital Sign    • Unable to Pay for Housing in the Last Year: Yes    • Number of Places Lived in the Last Year: 3    • Unstable Housing in the Last Year: Yes        Current Outpatient Medications:   •  acetaminophen (TYLENOL) 325 mg tablet, Take 650 mg by mouth every 6 (six) hours as needed for mild pain, Disp: , Rfl:   •  amLODIPine (NORVASC) 5 mg tablet, Take 1 tablet (5 mg total) by mouth daily, Disp: 30 tablet, Rfl: 3  •  apixaban (ELIQUIS) 5 mg, Take 1 tablet (5 mg total) by mouth 2 (two) times a day Do not start before August 17, 2023., Disp: , Rfl: 0  •  atorvastatin (LIPITOR) 40 mg tablet, Take 1 tablet (40 mg total) by mouth daily, Disp: 90 tablet, Rfl: 3  •  cholecalciferol (VITAMIN D3) 1,000 units tablet, Take 2 tablets (2,000 Units total) by mouth daily, Disp: 60 tablet, Rfl: 2  •  Emollient (eucerin) lotion, Apply topically 2 (two) times a day = to BRUCE/L LE, Disp: 240 mL, Rfl: 0  •  Fluticasone-Salmeterol (Advair) 250-50 mcg/dose inhaler, Inhale 1 puff 2 (two) times a day Rinse mouth after use., Disp: , Rfl:   •  furosemide (LASIX) 20 mg tablet, Take 1 tablet (20 mg total) by mouth 2 (two) times a day, Disp: 60 tablet, Rfl: 3  •  gabapentin (NEURONTIN) 100 mg capsule, Take 1 capsule (100 mg total) by mouth 2 (two) times a day, Disp: 60 capsule, Rfl: 3  •  hydrALAZINE (APRESOLINE) 10 mg tablet, Take 1 tablet (10 mg total) by mouth 3 (three) times a day, Disp: 90 tablet, Rfl: 3  •  insulin glargine (LANTUS) 100 units/mL subcutaneous injection, Inject 25 Units under the skin every 12 (twelve) hours (Patient taking differently: Inject 18 Units under the skin every 12 (twelve) hours), Disp: 15 mL, Rfl: 3  •  loperamide (IMODIUM) 2 mg capsule, Take 1 capsule (2 mg total) by mouth 4 (four) times a day as needed for diarrhea (can have up to 8mg per day), Disp: 30 capsule, Rfl: 0  •  losartan (COZAAR) 100 MG tablet, Take 1 tablet (100 mg total) by mouth daily, Disp: 30 tablet, Rfl: 5  •  menthol-methyl salicylate (BENGAY) 70-08 % cream, Apply topically 4 (four) times a day as needed (myalgia), Disp: , Rfl: 0  •  metFORMIN (GLUCOPHAGE) 1000 MG tablet, Take 1 tablet (1,000 mg total) by mouth 2 (two) times a day with meals, Disp: 60 tablet, Rfl: 3  •  metoprolol tartrate (LOPRESSOR) 100 mg tablet, Take 1 tablet (100 mg total) by mouth every 12 (twelve) hours, Disp: 60 tablet, Rfl: 3  •  multivitamin-iron-minerals-folic acid (THERAPEUTIC-M) TABS tablet, Take 1 tablet by mouth daily, Disp: 30 tablet, Rfl: 2  •  Petrolatum-Zinc Oxide 57-17 % PSTE, Apply 1 application. topically 3 (three) times a day To sacral area - protective, Disp: , Rfl:   •  predniSONE 1 mg tablet, Take 4 tablets (4 mg total) by mouth daily, Disp: 120 tablet, Rfl: 0  •  topiramate (TOPAMAX) 100 mg tablet, Take 1 tablet (100 mg total) by mouth daily at bedtime, Disp: 30 tablet, Rfl: 0  •  apixaban (ELIQUIS) 5 mg, Take 2 tablets (10 mg total) by mouth 2 (two) times a day for 3 doses, Disp: 6 tablet, Rfl: 0  •  Blood Glucose Monitoring Suppl (OneTouch Verio Reflect) w/Device KIT, Use 1 each 4 (four) times a day (Patient not taking: Reported on 9/11/2023), Disp: 1 kit, Rfl: 0  •  Blood Pressure Monitor KIT, Check blood pressures BID (Patient not taking: Reported on 9/11/2023), Disp: 1 kit, Rfl: 0  •  budesonide-formoterol (Symbicort) 160-4.5 mcg/act inhaler, Inhale 1 puff 2 (two) times a day Rinse mouth after use.  (Patient not taking: Reported on 9/11/2023), Disp: 10.2 g, Rfl: 3  •  Continuous Blood Gluc  (FreeStyle [place] 2 [place]) ERIC, Use 1 each 4 (four) times a day (Patient not taking: Reported on 9/11/2023), Disp: 1 each, Rfl: 0  •  Continuous Blood Gluc Sensor (FreeStyle Denisa 2 Sensor) MISC, Use 1 each every 14 (fourteen) days (Patient not taking: Reported on 9/11/2023), Disp: 2 each, Rfl: 0  •  docusate sodium (COLACE) 100 mg capsule, Take 1 capsule (100 mg total) by mouth 2 (two) times a day, Disp: 60 capsule, Rfl: 0  •  insulin lispro (HumaLOG) 100 units/mL injection, Inject 5 Units under the skin 3 (three) times a day with meals, Disp: 4.5 mL, Rfl: 0  •  Insulin Pen Needle (BD Pen Needle Tita 2nd Gen) 32G X 4 MM MISC, For use with insulin pen. Pharmacy may dispense brand covered by insurance. (Patient not taking: Reported on 9/11/2023), Disp: 100 each, Rfl: 0  •  Insulin Pen Needle (BD Pen Needle Tita U/F) 32G X 4 MM MISC, Use four times daily as directed with insulin pen (Patient not taking: Reported on 9/11/2023), Disp: 200 each, Rfl: 3  •  Lancets (OneTouch Delica Plus [id number]) MISC, Use 1 each 4 (four) times a day (Patient not taking: Reported on 9/11/2023), Disp: 200 each, Rfl: 2    Review of Systems    Objective:  /72   Pulse 76   Temp 97.5 °F (36.4 °C)   Resp 18   Ht 5' 8" (1.727 m)   Wt 113 kg (249 lb)   BMI 37.86 kg/m²         Physical Exam  Vitals and nursing note reviewed. Constitutional:       General: She is not in acute distress. Appearance: She is obese. She is not toxic-appearing. HENT:      Head: Normocephalic and atraumatic. Eyes:      Extraocular Movements: Extraocular movements intact. Cardiovascular:      Rate and Rhythm: Normal rate and regular rhythm. Pulses: Decreased pulses. Dorsalis pedis pulses are detected w/ Doppler on the right side and detected w/ Doppler on the left side. Posterior tibial pulses are detected w/ Doppler on the right side and detected w/ Doppler on the left side. Comments: Biphasic signals bilateral DP and PTA. Pulmonary:      Effort: Pulmonary effort is normal. No respiratory distress. Musculoskeletal:         General: No tenderness or signs of injury. Cervical back: Normal range of motion and neck supple. Right lower leg: Edema present. Left lower leg: Edema present. Skin:     General: Skin is warm. Capillary Refill: Capillary refill takes less than 2 seconds. Coloration: Skin is not cyanotic or mottled. Findings: Wound present. No abscess. Nails: There is no clubbing.       Comments: Ulcer with eschar right submet 5 head, submet 5 base, and left submet 5. Eschar is loose from right submet 5 wound. No active infection. No deep probing. See wound assessment and photo. Neurological:      General: No focal deficit present. Mental Status: She is alert and oriented to person, place, and time. Cranial Nerves: No cranial nerve deficit. Sensory: Sensory deficit present. Coordination: Coordination normal.   Psychiatric:         Mood and Affect: Mood normal.         Behavior: Behavior normal.         Thought Content: Thought content normal.         Judgment: Judgment normal.              Debridement   Wound 09/11/23 Diabetic Ulcer Foot Right;Plantar;Distal    Universal Protocol:  Consent: Verbal consent obtained. Risks and benefits: risks, benefits and alternatives were discussed  Consent given by: patient  Time out: Immediately prior to procedure a "time out" was called to verify the correct patient, procedure, equipment, support staff and site/side marked as required.   Timeout called at: 9/11/2023 9:03 AM.  Patient understanding: patient states understanding of the procedure being performed  Patient identity confirmed: verbally with patient      Performed by: physician  Debridement type: surgical  Level of debridement: subcutaneous tissue  Pain control: lidocaine 4%  Post-debridement measurements  Length (cm): 2.1  Width (cm): 2.1  Depth (cm): 0.2  Percent debrided: 100%  Surface Area (cm^2): 4.41  Area debrided (cm^2): 4.41  Volume (cm^3): 0.88  Tissue and other material debrided: dermis, epidermis and subcutaneous tissue  Devitalized tissue debrided: fibrin, necrotic debris and slough  Instrument(s) utilized: blade  Bleeding: small  Hemostasis obtained with: pressure  Procedural pain (0-10): 0  Post-procedural pain: 0   Response to treatment: procedure was tolerated well           Results from last 6 Months   Lab Units 08/09/23  0992   WOUND CULTURE  4+ Growth of       Lab Results Component Value Date    HGBA1C 10.2 (H) 08/24/2023       Wound Instructions:  Orders Placed This Encounter   Procedures   • Wound cleansing and dressings     Bilateral foot wounds    May shower with protection only. Keep dressings clean,dry, and intact     Cleanse wounds with normal saline. Pat dry  Apply nickel thick santyl to all wounds. (one time application of medihoney used at wound care today)  Cover with ABD  Secure with tita wrap and tape  Change dressing daily     Above was performed at wound care center today          Patient is to be non-weight bearing at this time.      Patient to have 3-4 servings of protein daily     Standing Status:   Future     Standing Expiration Date:   9/11/2024   • Debridement     This order was created via procedure documentation             Ayana Barnes DPM

## 2023-09-11 NOTE — PATIENT INSTRUCTIONS
Orders Placed This Encounter   Procedures    Wound cleansing and dressings     Bilateral foot wounds    May shower with protection only. Keep dressings clean,dry, and intact     Cleanse wounds with normal saline. Pat dry  Apply nickel thick santyl to all wounds. (one time application of medihoney used at wound care today)  Cover with ABD  Secure with tita wrap and tape  Change dressing daily     Above was performed at wound care center today          Patient is to be non-weight bearing at this time.      Patient to have 3-4 servings of protein daily     Standing Status:   Future     Standing Expiration Date:   9/11/2024

## 2023-09-18 ENCOUNTER — VBI (OUTPATIENT)
Dept: ADMINISTRATIVE | Facility: OTHER | Age: 63
End: 2023-09-18

## 2023-09-22 ENCOUNTER — TELEPHONE (OUTPATIENT)
Dept: FAMILY MEDICINE CLINIC | Facility: CLINIC | Age: 63
End: 2023-09-22

## 2023-09-22 NOTE — TELEPHONE ENCOUNTER
Pt left a vm requesting a call to reschedule 9/20/23 missed appt. first attempt to contact patient. left message to return my call on answering machine  .

## 2023-09-23 ENCOUNTER — APPOINTMENT (EMERGENCY)
Dept: RADIOLOGY | Facility: HOSPITAL | Age: 63
DRG: 872 | End: 2023-09-23
Payer: COMMERCIAL

## 2023-09-23 ENCOUNTER — HOSPITAL ENCOUNTER (INPATIENT)
Facility: HOSPITAL | Age: 63
LOS: 6 days | Discharge: NURSING, REHAB OR LONG-TERM CARE | DRG: 872 | End: 2023-09-30
Attending: EMERGENCY MEDICINE | Admitting: INTERNAL MEDICINE
Payer: COMMERCIAL

## 2023-09-23 DIAGNOSIS — M54.9 BACK PAIN: ICD-10-CM

## 2023-09-23 DIAGNOSIS — R65.20 SEVERE SEPSIS (HCC): ICD-10-CM

## 2023-09-23 DIAGNOSIS — Z59.00 HOMELESSNESS: ICD-10-CM

## 2023-09-23 DIAGNOSIS — R05.9 COUGH: ICD-10-CM

## 2023-09-23 DIAGNOSIS — E11.621 DIABETIC FOOT ULCER (HCC): ICD-10-CM

## 2023-09-23 DIAGNOSIS — B37.9 CANDIDIASIS: ICD-10-CM

## 2023-09-23 DIAGNOSIS — E11.65 UNCONTROLLED TYPE 2 DIABETES MELLITUS WITH HYPERGLYCEMIA (HCC): ICD-10-CM

## 2023-09-23 DIAGNOSIS — L03.90 CELLULITIS: ICD-10-CM

## 2023-09-23 DIAGNOSIS — L30.4 INTERTRIGO: ICD-10-CM

## 2023-09-23 DIAGNOSIS — A41.9 SEVERE SEPSIS (HCC): ICD-10-CM

## 2023-09-23 DIAGNOSIS — L97.509 DIABETIC FOOT ULCER (HCC): ICD-10-CM

## 2023-09-23 DIAGNOSIS — H10.9 BACTERIAL CONJUNCTIVITIS: Primary | ICD-10-CM

## 2023-09-23 DIAGNOSIS — L03.119 CELLULITIS OF LOWER EXTREMITY, UNSPECIFIED LATERALITY: ICD-10-CM

## 2023-09-23 PROCEDURE — 99285 EMERGENCY DEPT VISIT HI MDM: CPT

## 2023-09-23 PROCEDURE — 71045 X-RAY EXAM CHEST 1 VIEW: CPT

## 2023-09-23 RX ORDER — ERYTHROMYCIN 5 MG/G
OINTMENT OPHTHALMIC ONCE
Status: COMPLETED | OUTPATIENT
Start: 2023-09-23 | End: 2023-09-24

## 2023-09-23 RX ORDER — NYSTATIN 100000 [USP'U]/G
POWDER TOPICAL ONCE
Status: COMPLETED | OUTPATIENT
Start: 2023-09-23 | End: 2023-09-24

## 2023-09-23 SDOH — ECONOMIC STABILITY - HOUSING INSECURITY: HOMELESSNESS UNSPECIFIED: Z59.00

## 2023-09-24 ENCOUNTER — APPOINTMENT (EMERGENCY)
Dept: CT IMAGING | Facility: HOSPITAL | Age: 63
DRG: 872 | End: 2023-09-24
Payer: COMMERCIAL

## 2023-09-24 PROBLEM — H10.33 ACUTE BACTERIAL CONJUNCTIVITIS OF BOTH EYES: Status: ACTIVE | Noted: 2023-09-24

## 2023-09-24 PROBLEM — H10.9 BILATERAL CONJUNCTIVITIS: Status: ACTIVE | Noted: 2023-09-24

## 2023-09-24 PROBLEM — R65.20 SEVERE SEPSIS (HCC): Status: ACTIVE | Noted: 2023-04-12

## 2023-09-24 LAB
ALBUMIN SERPL BCP-MCNC: 2.5 G/DL (ref 3.5–5)
ALBUMIN SERPL BCP-MCNC: 3.2 G/DL (ref 3.5–5)
ALP SERPL-CCNC: 106 U/L (ref 34–104)
ALP SERPL-CCNC: 140 U/L (ref 34–104)
ALT SERPL W P-5'-P-CCNC: 6 U/L (ref 7–52)
ALT SERPL W P-5'-P-CCNC: 7 U/L (ref 7–52)
ANION GAP SERPL CALCULATED.3IONS-SCNC: 10 MMOL/L
ANION GAP SERPL CALCULATED.3IONS-SCNC: 4 MMOL/L
APTT PPP: 37 SECONDS (ref 23–37)
AST SERPL W P-5'-P-CCNC: 7 U/L (ref 13–39)
AST SERPL W P-5'-P-CCNC: 8 U/L (ref 13–39)
ATRIAL RATE: 120 BPM
BASOPHILS # BLD AUTO: 0.05 THOUSANDS/ÂΜL (ref 0–0.1)
BASOPHILS # BLD AUTO: 0.06 THOUSANDS/ÂΜL (ref 0–0.1)
BASOPHILS NFR BLD AUTO: 0 % (ref 0–1)
BASOPHILS NFR BLD AUTO: 0 % (ref 0–1)
BILIRUB SERPL-MCNC: 0.4 MG/DL (ref 0.2–1)
BILIRUB SERPL-MCNC: 0.46 MG/DL (ref 0.2–1)
BUN SERPL-MCNC: 16 MG/DL (ref 5–25)
BUN SERPL-MCNC: 20 MG/DL (ref 5–25)
CALCIUM ALBUM COR SERPL-MCNC: 9.1 MG/DL (ref 8.3–10.1)
CALCIUM ALBUM COR SERPL-MCNC: 9.8 MG/DL (ref 8.3–10.1)
CALCIUM SERPL-MCNC: 7.9 MG/DL (ref 8.4–10.2)
CALCIUM SERPL-MCNC: 9.2 MG/DL (ref 8.4–10.2)
CHLORIDE SERPL-SCNC: 101 MMOL/L (ref 96–108)
CHLORIDE SERPL-SCNC: 105 MMOL/L (ref 96–108)
CO2 SERPL-SCNC: 25 MMOL/L (ref 21–32)
CO2 SERPL-SCNC: 26 MMOL/L (ref 21–32)
CREAT SERPL-MCNC: 0.85 MG/DL (ref 0.6–1.3)
CREAT SERPL-MCNC: 1.2 MG/DL (ref 0.6–1.3)
CRP SERPL QL: 129.8 MG/L
EOSINOPHIL # BLD AUTO: 0.18 THOUSAND/ÂΜL (ref 0–0.61)
EOSINOPHIL # BLD AUTO: 0.28 THOUSAND/ÂΜL (ref 0–0.61)
EOSINOPHIL NFR BLD AUTO: 1 % (ref 0–6)
EOSINOPHIL NFR BLD AUTO: 2 % (ref 0–6)
ERYTHROCYTE [DISTWIDTH] IN BLOOD BY AUTOMATED COUNT: 16.6 % (ref 11.6–15.1)
ERYTHROCYTE [DISTWIDTH] IN BLOOD BY AUTOMATED COUNT: 16.7 % (ref 11.6–15.1)
ERYTHROCYTE [SEDIMENTATION RATE] IN BLOOD: 73 MM/HOUR (ref 0–29)
FLUAV RNA RESP QL NAA+PROBE: NEGATIVE
FLUBV RNA RESP QL NAA+PROBE: NEGATIVE
GFR SERPL CREATININE-BSD FRML MDRD: 47 ML/MIN/1.73SQ M
GFR SERPL CREATININE-BSD FRML MDRD: 72 ML/MIN/1.73SQ M
GLUCOSE SERPL-MCNC: 165 MG/DL (ref 65–140)
GLUCOSE SERPL-MCNC: 193 MG/DL (ref 65–140)
GLUCOSE SERPL-MCNC: 218 MG/DL (ref 65–140)
GLUCOSE SERPL-MCNC: 280 MG/DL (ref 65–140)
GLUCOSE SERPL-MCNC: 285 MG/DL (ref 65–140)
GLUCOSE SERPL-MCNC: 324 MG/DL (ref 65–140)
GLUCOSE SERPL-MCNC: 378 MG/DL (ref 65–140)
HCT VFR BLD AUTO: 27.9 % (ref 34.8–46.1)
HCT VFR BLD AUTO: 34.9 % (ref 34.8–46.1)
HGB BLD-MCNC: 10.3 G/DL (ref 11.5–15.4)
HGB BLD-MCNC: 8.3 G/DL (ref 11.5–15.4)
IMM GRANULOCYTES # BLD AUTO: 0.1 THOUSAND/UL (ref 0–0.2)
IMM GRANULOCYTES # BLD AUTO: 0.12 THOUSAND/UL (ref 0–0.2)
IMM GRANULOCYTES NFR BLD AUTO: 1 % (ref 0–2)
IMM GRANULOCYTES NFR BLD AUTO: 1 % (ref 0–2)
INR PPP: 1.04 (ref 0.84–1.19)
LACTATE SERPL-SCNC: 1.5 MMOL/L (ref 0.5–2)
LACTATE SERPL-SCNC: 3.7 MMOL/L (ref 0.5–2)
LYMPHOCYTES # BLD AUTO: 1.83 THOUSANDS/ÂΜL (ref 0.6–4.47)
LYMPHOCYTES # BLD AUTO: 2.06 THOUSANDS/ÂΜL (ref 0.6–4.47)
LYMPHOCYTES NFR BLD AUTO: 11 % (ref 14–44)
LYMPHOCYTES NFR BLD AUTO: 14 % (ref 14–44)
MCH RBC QN AUTO: 23.6 PG (ref 26.8–34.3)
MCH RBC QN AUTO: 23.6 PG (ref 26.8–34.3)
MCHC RBC AUTO-ENTMCNC: 29.5 G/DL (ref 31.4–37.4)
MCHC RBC AUTO-ENTMCNC: 29.7 G/DL (ref 31.4–37.4)
MCV RBC AUTO: 79 FL (ref 82–98)
MCV RBC AUTO: 80 FL (ref 82–98)
MONOCYTES # BLD AUTO: 0.83 THOUSAND/ÂΜL (ref 0.17–1.22)
MONOCYTES # BLD AUTO: 1 THOUSAND/ÂΜL (ref 0.17–1.22)
MONOCYTES NFR BLD AUTO: 5 % (ref 4–12)
MONOCYTES NFR BLD AUTO: 7 % (ref 4–12)
NEUTROPHILS # BLD AUTO: 11.35 THOUSANDS/ÂΜL (ref 1.85–7.62)
NEUTROPHILS # BLD AUTO: 13.08 THOUSANDS/ÂΜL (ref 1.85–7.62)
NEUTS SEG NFR BLD AUTO: 76 % (ref 43–75)
NEUTS SEG NFR BLD AUTO: 82 % (ref 43–75)
NRBC BLD AUTO-RTO: 0 /100 WBCS
NRBC BLD AUTO-RTO: 0 /100 WBCS
P AXIS: 56 DEGREES
PLATELET # BLD AUTO: 299 THOUSANDS/UL (ref 149–390)
PLATELET # BLD AUTO: 400 THOUSANDS/UL (ref 149–390)
PMV BLD AUTO: 10.6 FL (ref 8.9–12.7)
PMV BLD AUTO: 11.1 FL (ref 8.9–12.7)
POTASSIUM SERPL-SCNC: 3.8 MMOL/L (ref 3.5–5.3)
POTASSIUM SERPL-SCNC: 4.1 MMOL/L (ref 3.5–5.3)
PR INTERVAL: 152 MS
PROCALCITONIN SERPL-MCNC: 0.07 NG/ML
PROCALCITONIN SERPL-MCNC: 0.16 NG/ML
PROT SERPL-MCNC: 5.9 G/DL (ref 6.4–8.4)
PROT SERPL-MCNC: 7.6 G/DL (ref 6.4–8.4)
PROTHROMBIN TIME: 13.6 SECONDS (ref 11.6–14.5)
QRS AXIS: 11 DEGREES
QRSD INTERVAL: 90 MS
QT INTERVAL: 346 MS
QTC INTERVAL: 489 MS
RBC # BLD AUTO: 3.52 MILLION/UL (ref 3.81–5.12)
RBC # BLD AUTO: 4.37 MILLION/UL (ref 3.81–5.12)
RSV RNA RESP QL NAA+PROBE: NEGATIVE
SARS-COV-2 RNA RESP QL NAA+PROBE: NEGATIVE
SODIUM SERPL-SCNC: 134 MMOL/L (ref 135–147)
SODIUM SERPL-SCNC: 137 MMOL/L (ref 135–147)
T WAVE AXIS: 66 DEGREES
VENTRICULAR RATE: 120 BPM
WBC # BLD AUTO: 14.84 THOUSAND/UL (ref 4.31–10.16)
WBC # BLD AUTO: 16.1 THOUSAND/UL (ref 4.31–10.16)

## 2023-09-24 PROCEDURE — 85610 PROTHROMBIN TIME: CPT

## 2023-09-24 PROCEDURE — 85025 COMPLETE CBC W/AUTO DIFF WBC: CPT

## 2023-09-24 PROCEDURE — 82948 REAGENT STRIP/BLOOD GLUCOSE: CPT

## 2023-09-24 PROCEDURE — 73701 CT LOWER EXTREMITY W/DYE: CPT

## 2023-09-24 PROCEDURE — 83605 ASSAY OF LACTIC ACID: CPT

## 2023-09-24 PROCEDURE — 96368 THER/DIAG CONCURRENT INF: CPT

## 2023-09-24 PROCEDURE — 36415 COLL VENOUS BLD VENIPUNCTURE: CPT

## 2023-09-24 PROCEDURE — 80053 COMPREHEN METABOLIC PANEL: CPT

## 2023-09-24 PROCEDURE — NC001 PR NO CHARGE: Performed by: PODIATRIST

## 2023-09-24 PROCEDURE — 0241U HB NFCT DS VIR RESP RNA 4 TRGT: CPT

## 2023-09-24 PROCEDURE — 85730 THROMBOPLASTIN TIME PARTIAL: CPT

## 2023-09-24 PROCEDURE — 84145 PROCALCITONIN (PCT): CPT

## 2023-09-24 PROCEDURE — 96365 THER/PROPH/DIAG IV INF INIT: CPT

## 2023-09-24 PROCEDURE — 85652 RBC SED RATE AUTOMATED: CPT

## 2023-09-24 PROCEDURE — 86140 C-REACTIVE PROTEIN: CPT

## 2023-09-24 PROCEDURE — 96366 THER/PROPH/DIAG IV INF ADDON: CPT

## 2023-09-24 PROCEDURE — 96361 HYDRATE IV INFUSION ADD-ON: CPT

## 2023-09-24 PROCEDURE — 99223 1ST HOSP IP/OBS HIGH 75: CPT | Performed by: INTERNAL MEDICINE

## 2023-09-24 PROCEDURE — 87040 BLOOD CULTURE FOR BACTERIA: CPT

## 2023-09-24 PROCEDURE — 93005 ELECTROCARDIOGRAM TRACING: CPT

## 2023-09-24 PROCEDURE — 87081 CULTURE SCREEN ONLY: CPT

## 2023-09-24 PROCEDURE — 93010 ELECTROCARDIOGRAM REPORT: CPT | Performed by: INTERNAL MEDICINE

## 2023-09-24 RX ORDER — PREDNISONE 1 MG/1
4 TABLET ORAL DAILY
Status: DISCONTINUED | OUTPATIENT
Start: 2023-09-24 | End: 2023-09-30 | Stop reason: HOSPADM

## 2023-09-24 RX ORDER — AMLODIPINE BESYLATE 5 MG/1
5 TABLET ORAL DAILY
Status: DISCONTINUED | OUTPATIENT
Start: 2023-09-24 | End: 2023-09-30 | Stop reason: HOSPADM

## 2023-09-24 RX ORDER — NYSTATIN 100000 [USP'U]/G
POWDER TOPICAL 2 TIMES DAILY
Status: DISCONTINUED | OUTPATIENT
Start: 2023-09-24 | End: 2023-09-30 | Stop reason: HOSPADM

## 2023-09-24 RX ORDER — ATORVASTATIN CALCIUM 40 MG/1
40 TABLET, FILM COATED ORAL DAILY
Status: DISCONTINUED | OUTPATIENT
Start: 2023-09-24 | End: 2023-09-30 | Stop reason: HOSPADM

## 2023-09-24 RX ORDER — CEFAZOLIN SODIUM 2 G/50ML
2000 SOLUTION INTRAVENOUS EVERY 8 HOURS
Status: DISCONTINUED | OUTPATIENT
Start: 2023-09-24 | End: 2023-09-24

## 2023-09-24 RX ORDER — ONDANSETRON 2 MG/ML
4 INJECTION INTRAMUSCULAR; INTRAVENOUS EVERY 6 HOURS PRN
Status: DISCONTINUED | OUTPATIENT
Start: 2023-09-24 | End: 2023-09-24

## 2023-09-24 RX ORDER — INSULIN GLARGINE 100 [IU]/ML
18 INJECTION, SOLUTION SUBCUTANEOUS EVERY 12 HOURS SCHEDULED
Status: DISCONTINUED | OUTPATIENT
Start: 2023-09-24 | End: 2023-09-30 | Stop reason: HOSPADM

## 2023-09-24 RX ORDER — LOSARTAN POTASSIUM 50 MG/1
100 TABLET ORAL DAILY
Status: DISCONTINUED | OUTPATIENT
Start: 2023-09-24 | End: 2023-09-28

## 2023-09-24 RX ORDER — MAGNESIUM HYDROXIDE/ALUMINUM HYDROXICE/SIMETHICONE 120; 1200; 1200 MG/30ML; MG/30ML; MG/30ML
30 SUSPENSION ORAL EVERY 6 HOURS PRN
Status: DISCONTINUED | OUTPATIENT
Start: 2023-09-24 | End: 2023-09-30 | Stop reason: HOSPADM

## 2023-09-24 RX ORDER — TOPIRAMATE 100 MG/1
100 TABLET, FILM COATED ORAL
Status: DISCONTINUED | OUTPATIENT
Start: 2023-09-24 | End: 2023-09-30 | Stop reason: HOSPADM

## 2023-09-24 RX ORDER — INSULIN LISPRO 100 [IU]/ML
2-12 INJECTION, SOLUTION INTRAVENOUS; SUBCUTANEOUS
Status: DISCONTINUED | OUTPATIENT
Start: 2023-09-24 | End: 2023-09-30 | Stop reason: HOSPADM

## 2023-09-24 RX ORDER — GABAPENTIN 100 MG/1
100 CAPSULE ORAL 2 TIMES DAILY
Status: DISCONTINUED | OUTPATIENT
Start: 2023-09-24 | End: 2023-09-30 | Stop reason: HOSPADM

## 2023-09-24 RX ORDER — ACETAMINOPHEN 325 MG/1
650 TABLET ORAL EVERY 6 HOURS PRN
Status: DISCONTINUED | OUTPATIENT
Start: 2023-09-24 | End: 2023-09-30 | Stop reason: HOSPADM

## 2023-09-24 RX ORDER — INSULIN LISPRO 100 [IU]/ML
5 INJECTION, SOLUTION INTRAVENOUS; SUBCUTANEOUS
Status: DISCONTINUED | OUTPATIENT
Start: 2023-09-24 | End: 2023-09-30 | Stop reason: HOSPADM

## 2023-09-24 RX ORDER — SODIUM CHLORIDE, SODIUM GLUCONATE, SODIUM ACETATE, POTASSIUM CHLORIDE, MAGNESIUM CHLORIDE, SODIUM PHOSPHATE, DIBASIC, AND POTASSIUM PHOSPHATE .53; .5; .37; .037; .03; .012; .00082 G/100ML; G/100ML; G/100ML; G/100ML; G/100ML; G/100ML; G/100ML
1000 INJECTION, SOLUTION INTRAVENOUS ONCE
Status: COMPLETED | OUTPATIENT
Start: 2023-09-24 | End: 2023-09-24

## 2023-09-24 RX ORDER — SODIUM CHLORIDE, SODIUM GLUCONATE, SODIUM ACETATE, POTASSIUM CHLORIDE, MAGNESIUM CHLORIDE, SODIUM PHOSPHATE, DIBASIC, AND POTASSIUM PHOSPHATE .53; .5; .37; .037; .03; .012; .00082 G/100ML; G/100ML; G/100ML; G/100ML; G/100ML; G/100ML; G/100ML
100 INJECTION, SOLUTION INTRAVENOUS CONTINUOUS
Status: DISCONTINUED | OUTPATIENT
Start: 2023-09-24 | End: 2023-09-28

## 2023-09-24 RX ORDER — FLUTICASONE FUROATE AND VILANTEROL 200; 25 UG/1; UG/1
1 POWDER RESPIRATORY (INHALATION)
Status: DISCONTINUED | OUTPATIENT
Start: 2023-09-24 | End: 2023-09-30 | Stop reason: HOSPADM

## 2023-09-24 RX ORDER — METOPROLOL TARTRATE 100 MG/1
100 TABLET ORAL EVERY 12 HOURS SCHEDULED
Status: DISCONTINUED | OUTPATIENT
Start: 2023-09-24 | End: 2023-09-30 | Stop reason: HOSPADM

## 2023-09-24 RX ORDER — HYDRALAZINE HYDROCHLORIDE 10 MG/1
10 TABLET, FILM COATED ORAL 3 TIMES DAILY
Status: DISCONTINUED | OUTPATIENT
Start: 2023-09-24 | End: 2023-09-28

## 2023-09-24 RX ORDER — VANCOMYCIN HYDROCHLORIDE 1 G/200ML
1000 INJECTION, SOLUTION INTRAVENOUS EVERY 12 HOURS
Status: DISCONTINUED | OUTPATIENT
Start: 2023-09-24 | End: 2023-09-25

## 2023-09-24 RX ORDER — ERYTHROMYCIN 5 MG/G
0.5 OINTMENT OPHTHALMIC EVERY 6 HOURS SCHEDULED
Status: DISCONTINUED | OUTPATIENT
Start: 2023-09-24 | End: 2023-09-30 | Stop reason: HOSPADM

## 2023-09-24 RX ADMIN — SODIUM CHLORIDE, SODIUM GLUCONATE, SODIUM ACETATE, POTASSIUM CHLORIDE, MAGNESIUM CHLORIDE, SODIUM PHOSPHATE, DIBASIC, AND POTASSIUM PHOSPHATE 100 ML/HR: .53; .5; .37; .037; .03; .012; .00082 INJECTION, SOLUTION INTRAVENOUS at 08:30

## 2023-09-24 RX ADMIN — PIPERACILLIN SODIUM AND TAZOBACTAM SODIUM 3.38 G: 36; 4.5 INJECTION, POWDER, LYOPHILIZED, FOR SOLUTION INTRAVENOUS at 21:08

## 2023-09-24 RX ADMIN — GABAPENTIN 100 MG: 100 CAPSULE ORAL at 08:23

## 2023-09-24 RX ADMIN — INSULIN LISPRO 5 UNITS: 100 INJECTION, SOLUTION INTRAVENOUS; SUBCUTANEOUS at 09:17

## 2023-09-24 RX ADMIN — VANCOMYCIN HYDROCHLORIDE 1000 MG: 1 INJECTION, SOLUTION INTRAVENOUS at 12:25

## 2023-09-24 RX ADMIN — SODIUM CHLORIDE 1000 ML: 0.9 INJECTION, SOLUTION INTRAVENOUS at 00:37

## 2023-09-24 RX ADMIN — APIXABAN 5 MG: 5 TABLET, FILM COATED ORAL at 08:22

## 2023-09-24 RX ADMIN — INSULIN GLARGINE 18 UNITS: 100 INJECTION, SOLUTION SUBCUTANEOUS at 21:08

## 2023-09-24 RX ADMIN — INSULIN LISPRO 2 UNITS: 100 INJECTION, SOLUTION INTRAVENOUS; SUBCUTANEOUS at 16:36

## 2023-09-24 RX ADMIN — SODIUM CHLORIDE, SODIUM GLUCONATE, SODIUM ACETATE, POTASSIUM CHLORIDE, MAGNESIUM CHLORIDE, SODIUM PHOSPHATE, DIBASIC, AND POTASSIUM PHOSPHATE 1000 ML: .53; .5; .37; .037; .03; .012; .00082 INJECTION, SOLUTION INTRAVENOUS at 07:16

## 2023-09-24 RX ADMIN — HYDRALAZINE HYDROCHLORIDE 10 MG: 10 TABLET, FILM COATED ORAL at 08:23

## 2023-09-24 RX ADMIN — INSULIN LISPRO 4 UNITS: 100 INJECTION, SOLUTION INTRAVENOUS; SUBCUTANEOUS at 11:54

## 2023-09-24 RX ADMIN — LOSARTAN POTASSIUM 100 MG: 50 TABLET, FILM COATED ORAL at 08:23

## 2023-09-24 RX ADMIN — PIPERACILLIN AND TAZOBACTAM 4.5 G: 4; .5 INJECTION, POWDER, FOR SOLUTION INTRAVENOUS at 02:16

## 2023-09-24 RX ADMIN — NYSTATIN 1 APPLICATION: 100000 POWDER TOPICAL at 00:39

## 2023-09-24 RX ADMIN — SODIUM CHLORIDE 1000 ML: 0.9 INJECTION, SOLUTION INTRAVENOUS at 05:45

## 2023-09-24 RX ADMIN — INSULIN LISPRO 2 UNITS: 100 INJECTION, SOLUTION INTRAVENOUS; SUBCUTANEOUS at 21:08

## 2023-09-24 RX ADMIN — AMLODIPINE BESYLATE 5 MG: 5 TABLET ORAL at 08:23

## 2023-09-24 RX ADMIN — GABAPENTIN 100 MG: 100 CAPSULE ORAL at 17:04

## 2023-09-24 RX ADMIN — ERYTHROMYCIN 0.5 INCH: 5 OINTMENT OPHTHALMIC at 17:04

## 2023-09-24 RX ADMIN — HYDRALAZINE HYDROCHLORIDE 10 MG: 10 TABLET, FILM COATED ORAL at 21:13

## 2023-09-24 RX ADMIN — PIPERACILLIN SODIUM AND TAZOBACTAM SODIUM 3.38 G: 36; 4.5 INJECTION, POWDER, LYOPHILIZED, FOR SOLUTION INTRAVENOUS at 08:27

## 2023-09-24 RX ADMIN — SODIUM CHLORIDE, SODIUM GLUCONATE, SODIUM ACETATE, POTASSIUM CHLORIDE, MAGNESIUM CHLORIDE, SODIUM PHOSPHATE, DIBASIC, AND POTASSIUM PHOSPHATE 100 ML/HR: .53; .5; .37; .037; .03; .012; .00082 INJECTION, SOLUTION INTRAVENOUS at 21:13

## 2023-09-24 RX ADMIN — TOPIRAMATE 100 MG: 100 TABLET, FILM COATED ORAL at 21:13

## 2023-09-24 RX ADMIN — ATORVASTATIN CALCIUM 40 MG: 40 TABLET, FILM COATED ORAL at 08:23

## 2023-09-24 RX ADMIN — METOPROLOL TARTRATE 100 MG: 100 TABLET, FILM COATED ORAL at 21:13

## 2023-09-24 RX ADMIN — APIXABAN 5 MG: 5 TABLET, FILM COATED ORAL at 17:04

## 2023-09-24 RX ADMIN — INSULIN GLARGINE 18 UNITS: 100 INJECTION, SOLUTION SUBCUTANEOUS at 09:17

## 2023-09-24 RX ADMIN — FLUTICASONE FUROATE AND VILANTEROL TRIFENATATE 1 PUFF: 200; 25 POWDER RESPIRATORY (INHALATION) at 11:54

## 2023-09-24 RX ADMIN — METOPROLOL TARTRATE 100 MG: 100 TABLET, FILM COATED ORAL at 08:22

## 2023-09-24 RX ADMIN — ERYTHROMYCIN 1 INCH: 5 OINTMENT OPHTHALMIC at 00:37

## 2023-09-24 RX ADMIN — NYSTATIN: 100000 POWDER TOPICAL at 09:18

## 2023-09-24 RX ADMIN — PREDNISONE 4 MG: 1 TABLET ORAL at 08:23

## 2023-09-24 RX ADMIN — NYSTATIN: 100000 POWDER TOPICAL at 17:04

## 2023-09-24 RX ADMIN — ERYTHROMYCIN 0.5 INCH: 5 OINTMENT OPHTHALMIC at 08:00

## 2023-09-24 RX ADMIN — ERYTHROMYCIN 0.5 INCH: 5 OINTMENT OPHTHALMIC at 11:54

## 2023-09-24 RX ADMIN — INSULIN LISPRO 5 UNITS: 100 INJECTION, SOLUTION INTRAVENOUS; SUBCUTANEOUS at 16:36

## 2023-09-24 RX ADMIN — VANCOMYCIN HYDROCHLORIDE 1750 MG: 1 INJECTION, POWDER, LYOPHILIZED, FOR SOLUTION INTRAVENOUS at 02:14

## 2023-09-24 RX ADMIN — INSULIN LISPRO 6 UNITS: 100 INJECTION, SOLUTION INTRAVENOUS; SUBCUTANEOUS at 09:18

## 2023-09-24 RX ADMIN — IOHEXOL 100 ML: 350 INJECTION, SOLUTION INTRAVENOUS at 03:59

## 2023-09-24 RX ADMIN — INSULIN LISPRO 5 UNITS: 100 INJECTION, SOLUTION INTRAVENOUS; SUBCUTANEOUS at 11:55

## 2023-09-24 RX ADMIN — HYDRALAZINE HYDROCHLORIDE 10 MG: 10 TABLET, FILM COATED ORAL at 16:37

## 2023-09-24 RX ADMIN — PIPERACILLIN SODIUM AND TAZOBACTAM SODIUM 3.38 G: 36; 4.5 INJECTION, POWDER, LYOPHILIZED, FOR SOLUTION INTRAVENOUS at 13:44

## 2023-09-24 NOTE — PROGRESS NOTES
Edgar Goodwin is a 57 y.o. female who is currently ordered Vancomycin IV with management by the Pharmacy Consult Service. Relevant clinical data and objective / subjective history reviewed. Vancomycin Assessment:  Indication and Goal AUC/Trough:  Soft tissue (goal -600, trough >10)  Clinical Status: stable  Micro:      Renal Function:  SCr: 0.85 mg/dL  CrCl: 67.1 mL/min  Renal replacement: Not on dialysis  Days of Therapy: 1  Current Dose:  Received 1,750 mg x 1 in the ED this AM @ 0214  Vancomycin Plan:  New Dosing:  Start maintenance dose of 1,000 mg q12h, next dose to be given this afternoon @ 1300  Estimated AUC: 443 mcg*hr/mL  Estimated Trough: 14.6 mcg/mL  Next Level: Random level with AM labs on 9/25  Renal Function Monitoring: Daily BMP and UOP assessment  Pharmacy will continue to follow closely for s/sx of nephrotoxicity, infusion reactions and appropriateness of therapy. BMP and CBC will be ordered per protocol. We will continue to follow the patient’s culture results and clinical progress daily.     Parvez Moses, PharmD, [address] [place] [address] and Internal Medicine Clinical Pharmacist  [phone] or via [facility]

## 2023-09-24 NOTE — H&P
[facility]  H&P  Name: Pooja Allen 57 y.o. female I MRN: [id number]  Unit/Bed#: ED-26 I Date of Admission: 9/23/2023   Date of Service: 9/24/2023 I Hospital Day: 0      Assessment/Plan   * Severe sepsis ([address])  Assessment & Plan  · Pt with PMHx of homelessness, legally blind, wheelchair bound, DM2, chronic diabetic foot wounds, CKD, and PE presented to the ED with complaints of bilateral erythema x 3 weeks and intertrigio under right breast. It was then noted that patient had worsening of her bilateral chronic diabetic ulcerations. · Patient recently discharged from short term rehab about 2 weeks ago after being admitted at [facility] for COVID-19 infection at the end of August and has been living unsheltered since that time. She reports that was the last time her foot bandages were changed. Patient presented to the ED fulfilling septic criteria with tachycardia and leukocytosis 16.10   Septic source, multifactorial with bilateral cellulitis and bilateral conjunctivitis   CT lower extremities concerning for cellulitis via vrads, official read pending   CXR with no acute cardiopulmonary abnormalities  UA pending   Blood cultures pending   Lactic acid 3.7->1.5  Procalc 0.16, repeat in AM  S/p 3L IV fluids, continue  Continue on zosyn and vanc started in the ED  Podiatry consult pending       Acute bacterial conjunctivitis of both eyes  Assessment & Plan  · Pt reports that she began with symptoms of eye discharge and erythema about 2-3 weeks ago, which has been progressively getting worse since that time. She reports that she tried using some eye drops she had around with no relief.   · Continue erythromycin ointment q6 hours  · Supportive care  · Monitor for improvement     Ambulatory dysfunction  Assessment & Plan  · Patient reports that secondary to feeling unwell she has been feeling more generally weak  · Pt is wheelchair bound, but normally can make transfers from her chair to the toilet. She reports that she has been struggling to transfer and to make it to the rest room while being homeless, so she has been soiling herself  · PT/OT eval pending     Pulmonary embolism ([address])  Assessment & Plan  · Last admitted 8/08-8/15 to [facility] for acute PE and lower extremity DVT  · Discharged on Eliquis, which patient reports she has been taking, continue    Homelessness  Assessment & Plan  · Admitted to [facility] at the end of August and discharge to short term rehab, afterwards patient went back to living unsheltered x 2 weeks  · Patient reports that she has been trying to get into a senior apartment, but they are all full  · She reports difficulty finding a homeless shelter, so currently living unshelter  · Patient reports she tries to be compliant with medications, but it is sometimes difficult secondary to her situation  · Case management consulted    Stage 3a chronic kidney disease [facility])  Assessment & Plan  Lab Results   Component Value Date    EGFR 47 09/24/2023    EGFR 78 08/15/2023    EGFR 64 08/13/2023    CREATININE 1.20 09/24/2023    CREATININE 0.80 08/15/2023    CREATININE 0.94 08/13/2023   · POA creatinine 1.2  · Mildly elevated from baseline of 0.8-0.9  · Continue to monitor for improvement     Uncontrolled type 2 diabetes mellitus with hyperglycemia [facility])  Assessment & Plan  Lab Results   Component Value Date    HGBA1C 10.2 (H) 08/24/2023       Recent Labs     09/24/23  0132   POCGLU 324*       Blood Sugar Average: Last 72 hrs:  (P) 324   · Continue home insulin regimen of 18 units of Lantus q12 hours with 5 units of lispro tid with meals  · SSI with accucheks        VTE Pharmacologic Prophylaxis: VTE Score: 8 High Risk (Score >/= 5) - Pharmacological DVT Prophylaxis Ordered: apixaban (Eliquis). Sequential Compression Devices Ordered. Code Status: Level 1 - Full Code   Discussion with family: Updated  (brother) at bedside.     Anticipated Length of Stay: Patient will be admitted on an inpatient basis with an anticipated length of stay of greater than 2 midnights secondary to severe sepsis. Total Time Spent on Date of Encounter in care of patient: 75 minutes This time was spent on one or more of the following: performing physical exam; counseling and coordination of care; obtaining or reviewing history; documenting in the medical record; reviewing/ordering tests, medications or procedures; communicating with other healthcare professionals and discussing with patient's family/caregivers. Chief Complaint: "I have so much discharge coming from my eyes, and I feel so weak I cannot do my transfers"    History of Present Illness:  Oscar Jiang is a 57 y.o. female who presents with severe sepsis. Patient reports that she was discharged from short-term rehab 2 weeks ago and has been living on sheltered since. She reports around that time she began with bilateral erythema and discharge from her eyes. She reports that she tried some drops that she had available to her to help however the infection has continued to get worse. Patient also reports that she has noted severe redness underneath her right breast that has also been present over that time. Patient states that she started to become very weak, congested, and chills. She reports that she has not been able to make transfers from her wheelchair onto the toilet and so has been swelling herself. When arriving at the ED it was noted that patient had soiled bilateral lower extremity bandages. Patient reports that she has not changed his bandages since being discharged from short-term rehab 2 weeks ago. She was found to have superimposed cellulitis on top of her chronic diabetic foot ulcerations. She denies any chest pain, shortness of breath, headache, palpitations, abdominal pain, nausea, vomiting, pedal edema, or urinary symptoms. Review of Systems:  Review of Systems   Constitutional: Positive for chills.  Negative for appetite change, diaphoresis, fatigue and fever. HENT: Positive for congestion. Negative for rhinorrhea and sore throat. Eyes: Positive for pain, discharge, redness and visual disturbance (chronic, legally blind). Negative for photophobia. Respiratory: Positive for cough. Negative for shortness of breath and wheezing. Cardiovascular: Negative for chest pain, palpitations and leg swelling. Gastrointestinal: Negative for abdominal distention, abdominal pain, blood in stool, constipation, diarrhea, nausea and vomiting. Genitourinary: Negative for dysuria and hematuria. Musculoskeletal: Negative for arthralgias, back pain, myalgias and neck stiffness. Skin: Positive for color change (erythema under right breast) and wound (chronic bilateral diabetic foot ulcers with superimposed cellulitis). Negative for rash. Neurological: Negative for dizziness, seizures, syncope, weakness, light-headedness and headaches. Psychiatric/Behavioral: Negative for agitation, behavioral problems and confusion. The patient is not nervous/anxious. All other systems reviewed and are negative. Past Medical and Surgical History:   Past Medical History:   Diagnosis Date   • Anemia    • Benign hypertension     Procedure/Onset: 01/01/2005; Description: BP elevated today. Pt reports running out of BP meds 2wks ago. Will resume BP meds.    • Diabetes mellitus ([address])    • Diabetes mellitus type 2 with complications, uncontrolled 9/8/2015   • Dyspnea on exertion    • Hyperlipidemia    • Hypertension    • Legally blind 2010   • Miscarriage     x 3   • Polymyalgia rheumatica ([address]) 11/24/2021   • Seizures ([address])    • Sickle cell trait ([address])    • Stage 3a chronic kidney disease ([address]) 5/19/2022   • Thyroid nodule 3/6/2020       Past Surgical History:   Procedure Laterality Date   • CATARACT EXTRACTION Bilateral     august and september   • EYE SURGERY     • HYSTERECTOMY      37   • OOPHORECTOMY Left     37   • WA LIGATION/BIOPSY TEMPORAL ARTERY Bilateral 10/17/2019    Procedure: BIOPSY ARTERY TEMPORAL;  Surgeon: Hallie Dandy, DO;  Location: [facility];  Service: Vascular   • US GUIDED THYROID BIOPSY  5/13/2020       Meds/Allergies:  Prior to Admission medications    Medication Sig Start Date End Date Taking? Authorizing Provider   amLODIPine (NORVASC) 5 mg tablet Take 1 tablet (5 mg total) by mouth daily 7/21/23  Yes Mark Allen MD   apixaban (ELIQUIS) 5 mg Take 1 tablet (5 mg total) by mouth 2 (two) times a day Do not start before August 17, 2023. 8/17/23  Yes Deuce Vail MD   atorvastatin (LIPITOR) 40 mg tablet Take 1 tablet (40 mg total) by mouth daily 7/21/23  Yes Mark Allen MD   cholecalciferol (VITAMIN D3) 1,000 units tablet Take 2 tablets (2,000 Units total) by mouth daily 5/22/23  Yes CONCETTA Wong   docusate sodium (COLACE) 100 mg capsule Take 1 capsule (100 mg total) by mouth 2 (two) times a day 4/7/23 9/24/23 Yes CONCETTA Sandy   Fluticasone-Salmeterol (Advair) 250-50 mcg/dose inhaler Inhale 1 puff 2 (two) times a day Rinse mouth after use.    Yes Historical Provider, MD   furosemide (LASIX) 20 mg tablet Take 1 tablet (20 mg total) by mouth 2 (two) times a day 7/21/23  Yes Mark Allen MD   gabapentin (NEURONTIN) 100 mg capsule Take 1 capsule (100 mg total) by mouth 2 (two) times a day 7/21/23  Yes Mark Allen MD   hydrALAZINE (APRESOLINE) 10 mg tablet Take 1 tablet (10 mg total) by mouth 3 (three) times a day 7/21/23  Yes Mark Allen MD   insulin glargine (LANTUS) 100 units/mL subcutaneous injection Inject 25 Units under the skin every 12 (twelve) hours  Patient taking differently: Inject 18 Units under the skin every 12 (twelve) hours 7/21/23 11/18/23 Yes Mark Allen MD   loperamide (IMODIUM) 2 mg capsule Take 1 capsule (2 mg total) by mouth 4 (four) times a day as needed for diarrhea (can have up to 8mg per day) 3/8/23  Yes Vee Calderon DO   losartan (COZAAR) 100 MG tablet Take 1 tablet (100 mg total) by mouth daily 7/21/23  Yes Robina Palacios MD   metFORMIN (GLUCOPHAGE) 1000 MG tablet Take 1 tablet (1,000 mg total) by mouth 2 (two) times a day with meals 7/21/23  Yes Robina Palacios MD   metoprolol tartrate (LOPRESSOR) 100 mg tablet Take 1 tablet (100 mg total) by mouth every 12 (twelve) hours 7/21/23  Yes Robina Palacios MD   predniSONE 1 mg tablet Take 4 tablets (4 mg total) by mouth daily 5/30/23  Yes Shona Vincent MD   topiramate (TOPAMAX) 100 mg tablet Take 1 tablet (100 mg total) by mouth daily at bedtime  Patient taking differently: Take 100 mg by mouth daily at bedtime 5/30/23  Yes Shona Vincent MD   acetaminophen (TYLENOL) 325 mg tablet Take 650 mg by mouth every 6 (six) hours as needed for mild pain    Historical Provider, MD   apixaban (ELIQUIS) 5 mg Take 2 tablets (10 mg total) by mouth 2 (two) times a day for 3 doses 8/15/23 8/17/23  Tari Patino MD   Blood Glucose Monitoring Suppl (OneTouch Verio Reflect) w/Device KIT Use 1 each 4 (four) times a day  Patient not taking: Reported on 9/11/2023 5/22/23   CONCETTA Ibarra   Blood Pressure Monitor KIT Check blood pressures BID  Patient not taking: Reported on 9/11/2023 2/17/23   Damari Castillo PA-C   budesonide-formoterol (Symbicort) 160-4.5 mcg/act inhaler Inhale 1 puff 2 (two) times a day Rinse mouth after use.   Patient not taking: Reported on 9/11/2023 7/21/23   Robina Palacios MD   Continuous Blood Gluc  [facility] [place] [address] [place]) [facility] Use 1 each 4 (four) times a day  Patient not taking: Reported on 9/11/2023 2/17/23   Damari Castillo PA-C   Continuous Blood Gluc Sensor (FreeStyle Ramsey 2 Sensor) MISC Use 1 each every 14 (fourteen) days  Patient not taking: Reported on 9/11/2023 2/17/23   Damari Fall, PA-C   Emollient (eucerin) lotion Apply topically 2 (two) times a day = to B/L LYLE 8/15/23 9/14/23  Tari Patino MD   insulin lispro (HumaLOG) 100 units/mL injection Inject 5 Units under the skin 3 (three) times a day with meals 7/21/23 9/24/23  Malinda Morton MD   Insulin Pen Needle (BD Pen Needle Tita 2nd Gen) 32G X 4 MM MISC For use with insulin pen. Pharmacy may dispense brand covered by insurance. Patient not taking: Reported on 9/11/2023 5/30/23   Gustabo Cranker, MD   Insulin Pen Needle (BD Pen Needle Tita U/F) 32G X 4 MM MISC Use four times daily as directed with insulin pen  Patient not taking: Reported on 9/11/2023 5/22/23   CONCETTA Santoyo   Lancets (OneTouch Delica Plus [id number]) MISC Use 1 each 4 (four) times a day  Patient not taking: Reported on 9/11/2023 5/22/23   CONCETTA Santoyo   menthol-methyl salicylate (BENGAY) 28-95 % cream Apply topically 4 (four) times a day as needed (myalgia) 8/15/23   Yifan Madsen MD   multivitamin-iron-minerals-folic acid [facility]) TABS tablet Take 1 tablet by mouth daily 7/21/23   Malinda Morton MD   Petrolatum-Zinc Oxide 57-17 % PSTE Apply 1 application. topically 3 (three) times a day To sacral area - protective    Historical Provider, MD     I have reviewed home medications using recent Epic encounter.     Allergies: No Known Allergies    Social History:  Marital Status: /Civil Union   Patient Pre-hospital Living Situation: Home  Patient Pre-hospital Level of Mobility: manual wheelchair  Patient Pre-hospital Diet Restrictions: diabetic  Substance Use History:   Social History     Substance and Sexual Activity   Alcohol Use Never   • Alcohol/week: 0.0 standard drinks of alcohol     Social History     Tobacco Use   Smoking Status Never   Smokeless Tobacco Never     Social History     Substance and Sexual Activity   Drug Use No       Family History:  Family History   Problem Relation Age of Onset   • Heart attack Mother    • Heart disease Mother    • Hypertension Mother    • No Known Problems Father    • Heart disease Sister    • No Known Problems Brother    • No Known Problems Son    • No Known Problems Daughter    • Heart attack Maternal Grandmother    • Heart disease Maternal Grandmother    • Diabetes Other    • Heart attack Other    • No Known Problems Sister    • No Known Problems Sister    • No Known Problems Paternal Aunt    • No Known Problems Son    • Cancer Neg Hx    • Stroke Neg Hx        Physical Exam:     Vitals:   Blood Pressure: 137/63 (09/24/23 0530)  Pulse: (!) 116 (09/24/23 0530)  Temperature: 98.1 °F (36.7 °C) (09/23/23 2253)  Temp Source: Oral (09/23/23 2253)  Respirations: 19 (09/24/23 0530)  SpO2: 96 % (09/24/23 0530)    Physical Exam  Vitals and nursing note reviewed. Constitutional:       General: She is not in acute distress. Appearance: She is well-developed. She is obese. She is ill-appearing. HENT:      Head: Normocephalic and atraumatic. Nose: Nose normal. No congestion. Mouth/Throat:      Mouth: Mucous membranes are moist.      Pharynx: Oropharynx is clear. Eyes:      General:         Right eye: Discharge present. Left eye: Discharge present. Comments: Bilateral erythema and drainage from eyes   Cardiovascular:      Rate and Rhythm: Regular rhythm. Tachycardia present. Heart sounds: Normal heart sounds. No murmur heard. No friction rub. No gallop. Pulmonary:      Effort: Pulmonary effort is normal. No respiratory distress. Breath sounds: Normal breath sounds. No wheezing, rhonchi or rales. Abdominal:      General: Bowel sounds are normal. There is no distension. Palpations: Abdomen is soft. Tenderness: There is no abdominal tenderness. Musculoskeletal:      Cervical back: Neck supple. Right lower leg: No edema. Left lower leg: No edema. Skin:     General: Skin is warm and dry. Capillary Refill: Capillary refill takes less than 2 seconds. Findings: Lesion (bilateral foot ulcerations with superimposed cellulitis. Photos in media) present. Neurological:      General: No focal deficit present.       Mental Status: She is alert and oriented to person, place, and time. Mental status is at baseline. Psychiatric:         Mood and Affect: Mood normal.         Behavior: Behavior normal.          Additional Data:     Lab Results:  Results from last 7 days   Lab Units 09/24/23  0026   WBC Thousand/uL 16.10*   HEMOGLOBIN g/dL 10.3*   HEMATOCRIT % 34.9   PLATELETS Thousands/uL 400*   NEUTROS PCT % 82*   LYMPHS PCT % 11*   MONOS PCT % 5   EOS PCT % 1     Results from last 7 days   Lab Units 09/24/23  0026   SODIUM mmol/L 137   POTASSIUM mmol/L 3.8   CHLORIDE mmol/L 101   CO2 mmol/L 26   BUN mg/dL 20   CREATININE mg/dL 1.20   ANION GAP mmol/L 10   CALCIUM mg/dL 9.2   ALBUMIN g/dL 3.2*   TOTAL BILIRUBIN mg/dL 0.40   ALK PHOS U/L 140*   ALT U/L 7   AST U/L 7*   GLUCOSE RANDOM mg/dL 378*     Results from last 7 days   Lab Units 09/24/23  0026   INR  1.04     Results from last 7 days   Lab Units 09/24/23  0132   POC GLUCOSE mg/dl 324*         Results from last 7 days   Lab Units 09/24/23  0348 09/24/23  0026   LACTIC ACID mmol/L 1.5 3.7*   PROCALCITONIN ng/ml  --  0.16       Lines/Drains:  Invasive Devices     Peripheral Intravenous Line  Duration           Peripheral IV 09/24/23 Left Antecubital <1 day    Peripheral IV 09/24/23 Right Antecubital <1 day                    Imaging: Reviewed radiology reports from this admission including: chest xray and CT bilateral lower extremities  XR chest 1 view portable   ED Interpretation by January Rudd PA-C (09/24 0236)   ED wet read:  No acute cardiopulmonary disease appreciated. CT lower extremity w contrast right    (Results Pending)   CT lower extremity w contrast left    (Results Pending)       EKG and Other Studies Reviewed on Admission:   · EKG: Sinus Tachycardia. . ** Please Note: This note has been constructed using a voice recognition system.  **

## 2023-09-24 NOTE — ASSESSMENT & PLAN NOTE
· Pt with PMHx of homelessness, legally blind, wheelchair bound, DM2, chronic diabetic foot wounds, CKD, and PE presented to the ED with complaints of bilateral erythema x 3 weeks and intertrigio under right breast. It was then noted that patient had worsening of her bilateral chronic diabetic ulcerations. · Patient recently discharged from short term rehab about 2 weeks ago after being admitted at [facility] for COVID-19 infection at the end of August and has been living unsheltered since that time. She reports that was the last time her foot bandages were changed.   Patient presented to the ED fulfilling septic criteria with tachycardia and leukocytosis 16.10   Septic source, multifactorial with bilateral cellulitis and bilateral conjunctivitis   CT lower extremities concerning for cellulitis via vrads, official read pending   CXR with no acute cardiopulmonary abnormalities  UA pending   Blood cultures pending   Lactic acid 3.7->1.5  Procalc 0.16, repeat in AM  S/p 3L IV fluids, continue  Continue on zosyn and vanc started in the ED  Podiatry consult pending

## 2023-09-24 NOTE — SEPSIS NOTE
Sepsis Note   Renetta Sandhu 57 y.o. female MRN: [id number]  Unit/Bed#: ED-26 Encounter: [id number]       Initial Sepsis Screening     Row Name 09/24/23 0239 09/24/23 0140             Is the patient's history suggestive of a new or worsening infection? Yes (Proceed)  -OB Yes (Proceed)  -OB       Suspected source of infection soft tissue  -OB soft tissue  -OB       Indicate SIRS criteria Tachycardia > 90 bpm;Tachypnea > 20 resp per min;Leukocytosis (WBC > 99698 IJL) OR Leukopenia (WBC <4000 IJL) OR Bandemia (WBC >10% bands)  -OB Tachycardia > 90 bpm;Tachypnea > 20 resp per min;Leukocytosis (WBC > 20576 IJL) OR Leukopenia (WBC <4000 IJL) OR Bandemia (WBC >10% bands)  -OB       Are two or more of the above signs & symptoms of infection both present and new to the patient? Yes (Proceed)  -OB Yes (Proceed)  -OB       Assess for evidence of organ dysfunction: Are any of the below criteria present within 6 hours of suspected infection and SIRS criteria that are NOT considered to be chronic conditions? Lactate > 2.0  -OB Lactate > 2.0  -OB       Date of presentation of severe sepsis 09/24/23  -OB --       Time of presentation of severe sepsis 0026  -OB --       Sepsis Note: Click "NEXT" below (NOT "close") to generate sepsis note based on above information. YES (proceed by clicking "NEXT")  -OB --             User Key  (r) = Recorded By, (t) = Taken By, (c) = Cosigned By    [facility] Name Provider Type    OB Arabella Samayoa PA-C Physician Assistant                    There is no height or weight on file to calculate BMI.   Wt Readings from Last 1 Encounters:   09/11/23 113 kg (249 lb)        Ideal body weight: 63.9 kg (140 lb 14 oz)  Adjusted ideal body weight: 83.5 kg (184 lb 2 oz)

## 2023-09-24 NOTE — ASSESSMENT & PLAN NOTE
Lab Results   Component Value Date    EGFR 47 09/24/2023    EGFR 78 08/15/2023    EGFR 64 08/13/2023    CREATININE 1.20 09/24/2023    CREATININE 0.80 08/15/2023    CREATININE 0.94 08/13/2023   · POA creatinine 1.2  · Mildly elevated from baseline of 0.8-0.9  · Continue to monitor for improvement

## 2023-09-24 NOTE — ASSESSMENT & PLAN NOTE
· Patient reports that secondary to feeling unwell she has been feeling more generally weak  · Pt is wheelchair bound, but normally can make transfers from her chair to the toilet.  She reports that she has been struggling to transfer and to make it to the rest room while being homeless, so she has been soiling herself  · PT/OT eval pending

## 2023-09-24 NOTE — ASSESSMENT & PLAN NOTE
· Last admitted 8/08-8/15 to [facility] for acute PE and lower extremity DVT  · Discharged on Eliquis, which patient reports she has been taking, continue

## 2023-09-24 NOTE — ASSESSMENT & PLAN NOTE
Lab Results   Component Value Date    HGBA1C 10.2 (H) 08/24/2023       Recent Labs     09/24/23  0132   POCGLU 324*       Blood Sugar Average: Last 72 hrs:  (P) 324   · Continue home insulin regimen of 18 units of Lantus q12 hours with 5 units of lispro tid with meals  · SSI with accucheks

## 2023-09-24 NOTE — CONSULTS
Podiatry - Consultation    Patient Information:   Cindi Mcneill 57 y.o. female MRN: [id number]  Unit/Bed#: ED-26 Encounter: [id number]  PCP: Marilee Gee MD  Date of Admission:  9/23/2023  Date of Consultation: 09/24/23  Requesting Physician: Florence Goss Pe*      ASSESSMENT:    Cindi Mcneill is a 57 y.o. female with:    1. Severe sepsis  2. Bilateral diabetic foot ulcers- POA  3. Type 2 diabetes mellitus   4. Stage 3a CKD    PLAN:    · Plan for local wound care consisting of Betadine DSD to bilateral foot ulcerations. Wounds are clinically infected with surrounding erythema and minimal purulent drainage on palpation. · Follow up final read of bilateral lower extremity CT. · Antibiotics started in ED: Vancomycin/Zosyn. · Acute leukocytosis present with WBC of 16.10. Will contiune to trend labs/vitals. · Local wound care consisting of Betadine DSD. Wound care instructions placed. · Elevation and offloading on green foam wedges or pillows when non-ambulatory. · Rest of care per primary team.  · Will discuss this plan with my attending and update as needed. Weightbearing status: Weightbearing as tolerated    SUBJECTIVE:    History of Present Illness:    Cindi Mcneill is a 57 y.o. female who is originally admitted 9/23/2023 due to severe sepsis. Patient has a past medical history of uncontrolled type 2 diabetes, stage 3a CKD, ambulatory dysfunction, homelessness, hx of pulmonary embolism, and acute bacterial conjunctivitis of both eyes. We are consulted for bilateral lower extremity wounds. Patient states that she previously has seen Dr. Becky Mcclure in wound care, however admits that it has been a while since she has been to an appointment. She states that the wounds appear to have worsened over the past couple of weeks, and denies covering them with any dressing. She states that she just wears socks over her wounds, and is unable to offload due to her homelessness.      Review of Systems:    Constitutional: Negative. HENT: Negative. Eyes: Negative. Respiratory: Negative. Cardiovascular: Negative. Gastrointestinal: Negative. Musculoskeletal: Negative    Skin: bilateral foot wounds    Neurological: Negative    Psych: Negative. Past Medical and Surgical History:     Past Medical History:   Diagnosis Date   • Anemia    • Benign hypertension     Procedure/Onset: 01/01/2005; Description: BP elevated today. Pt reports running out of BP meds 2wks ago. Will resume BP meds.    • Diabetes mellitus ([address])    • Diabetes mellitus type 2 with complications, uncontrolled 9/8/2015   • Dyspnea on exertion    • Hyperlipidemia    • Hypertension    • Legally blind 2010   • Miscarriage     x 3   • Polymyalgia rheumatica ([address]) 11/24/2021   • Seizures (HCC)    • Sickle cell trait (HCC)    • Stage 3a chronic kidney disease ([address]) 5/19/2022   • Thyroid nodule 3/6/2020       Past Surgical History:   Procedure Laterality Date   • CATARACT EXTRACTION Bilateral     august and september   • EYE SURGERY     • HYSTERECTOMY      37   • OOPHORECTOMY Left     37   • VA LIGATION/BIOPSY TEMPORAL ARTERY Bilateral 10/17/2019    Procedure: BIOPSY ARTERY TEMPORAL;  Surgeon: Rhianna Izaguirre DO;  Location: [facility];  Service: Vascular   • US GUIDED THYROID BIOPSY  5/13/2020       Meds/Allergies:    (Not in a hospital admission)      No Known Allergies    Social History:     Marital Status: /Civil Union    Substance Use History:   Social History     Substance and Sexual Activity   Alcohol Use Never   • Alcohol/week: 0.0 standard drinks of alcohol     Social History     Tobacco Use   Smoking Status Never   Smokeless Tobacco Never     Social History     Substance and Sexual Activity   Drug Use No       Family History:    Family History   Problem Relation Age of Onset   • Heart attack Mother    • Heart disease Mother    • Hypertension Mother    • No Known Problems Father    • Heart disease Sister    • No Known Problems Brother    • No Known Problems Son    • No Known Problems Daughter    • Heart attack Maternal Grandmother    • Heart disease Maternal Grandmother    • Diabetes Other    • Heart attack Other    • No Known Problems Sister    • No Known Problems Sister    • No Known Problems Paternal Aunt    • No Known Problems Son    • Cancer Neg Hx    • Stroke Neg Hx          OBJECTIVE:    Vitals:   Blood Pressure: 137/63 (09/24/23 0530)  Pulse: (!) 116 (09/24/23 0530)  Temperature: 98.1 °F (36.7 °C) (09/23/23 2253)  Temp Source: Oral (09/23/23 2253)  Respirations: 19 (09/24/23 0530)  SpO2: 96 % (09/24/23 0530)    Physical Exam:    General Appearance: Alert, cooperative, no distress. HEENT: Head normocephalic, atraumatic, without obvious abnormality. Heart: Normal rate and rhythm. Lungs: Non-labored breathing. No respiratory distress. Abdomen: Without distension. Psychiatric: AAOx3  Lower Extremity:    Vascular:   DP: Right: non-palpable Left: non-palpable  PT: Right: non-palpable Left: non-palpable  CRT < 3 seconds at the digits. +2/4 edema noted at bilateral lower extremities. Pedal hair is absent. Skin temperature is cool bilaterally. Musculoskeletal:  MMT is 4/5 in all muscle compartments bilaterally. ROM at the 1st MPJ and ankle joint are decreased bilaterally with the leg extended. No Pain on palpation of bilateral lower extremities. No gross deformities noted. Dermatological:  Lower extremity wound(s) as noted below:    Wound #: 1  Location: Right plantar foot   Length 2.0 cm: Width 3.5cm: Depth 0.2cm:   Deepest Tissue Noted in Base: Subcutaneous   Probe to Bone: No  Peripheral Skin Description: Attached  Granulation: 0% Fibrotic Tissue: 100% Necrotic Tissue: 0%   Drainage Amount: minimal, serous  Signs of Infection: No      Plantar right foot eschar, well adhered with adriana-wound erythema. No fluctuance or crepitus on palpation.      Plantar left foot eschar that is loosely adhered with purulent drainage on palpation. Soraya-wound erythema. No fluctuance or crepitus on palpation. Bilateral lower extremities are edematous, with dusky discoloration to the legs. Neurological:  Gross sensation is intact. Light touch is diminished. Protective sensation is diminished. Clinical Images 09/24/23: Additional data:     Lab Results: I have personally reviewed pertinent labs including:    Results from last 7 days   Lab Units 09/24/23  0026   WBC Thousand/uL 16.10*   HEMOGLOBIN g/dL 10.3*   HEMATOCRIT % 34.9   PLATELETS Thousands/uL 400*   NEUTROS PCT % 82*   LYMPHS PCT % 11*   MONOS PCT % 5   EOS PCT % 1     Results from last 7 days   Lab Units 09/24/23  0026   POTASSIUM mmol/L 3.8   CHLORIDE mmol/L 101   CO2 mmol/L 26   BUN mg/dL 20   CREATININE mg/dL 1.20   CALCIUM mg/dL 9.2   ALK PHOS U/L 140*   ALT U/L 7   AST U/L 7*     Results from last 7 days   Lab Units 09/24/23  0026   INR  1.04       Cultures: I have personally reviewed pertinent cultures including:              Imaging: I have personally reviewed pertinent reports in PACS. EKG, Pathology, and Other Studies: I have personally reviewed pertinent reports. Time Spent for Care: 30 minutes. More than 50% of total time spent on counseling and coordination of care as described above. ** Please Note: Portions of the record may have been created with voice recognition software. Occasional wrong word or "sound a like" substitutions may have occurred due to the inherent limitations of voice recognition software. Read the chart carefully and recognize, using context, where substitutions have occurred.  **

## 2023-09-24 NOTE — ASSESSMENT & PLAN NOTE
· Pt reports that she began with symptoms of eye discharge and erythema about 2-3 weeks ago, which has been progressively getting worse since that time. She reports that she tried using some eye drops she had around with no relief.   · Continue erythromycin ointment q6 hours  · Supportive care  · Monitor for improvement

## 2023-09-24 NOTE — ED NOTES
Patient rang call bell stating she soiled herself. Urine and stool noted on sheets. Pt turned, cleaned up, and new sheets applied. Pt given warm blanket and fresh pillow. Instructed to ring call bell if she needs to go to the bathroom and a bedpan can be provided.       Karyle Hilda Short  09/24/23 0126

## 2023-09-24 NOTE — ASSESSMENT & PLAN NOTE
· Admitted to [facility] at the end of August and discharge to short term rehab, afterwards patient went back to living unsheltered x 2 weeks  · Patient reports that she has been trying to get into a senior apartment, but they are all full  · She reports difficulty finding a homeless shelter, so currently living unshelter  · Patient reports she tries to be compliant with medications, but it is sometimes difficult secondary to her situation  · Case management consulted

## 2023-09-24 NOTE — ED PROVIDER NOTES
History  Chief Complaint   Patient presents with   • Eye Pain     Pt coming via EMS, currently homeless, c/o eye pain for approx. 2 weeks. Swelling, redness, and drainage noted to bilateral eyes. Pt reports had COVID, symptoms resided and eye pain began. Pt noticeably shaking in triage and completely soiled. • Medical Problem     Pt reports being soiled "for days" because "I'm so scared to get up to use the bathroom" - when asked to elaborate pt is tearful and states she can't get up to walk to the bathroom. Large rash is also noted under R breast, pt unsure of how long it has been there but states "I have a lump". Pt reports itchiness to the area. Pt also has bilateral foot wounds, states they have been there "a really long time". 78-year-old female with an extensive past medical history presenting to the ED with multiple complaints. Patient states she's been having b/l red eyes with associated discharge for a few weeks now. States initially started in 1 eye and then went to both eyes. Reports there is yellow/green discharge from both eyes. No associated eye pain, eye irritation, pain with eye movement or pain surrounding her eyes. No close contacts with similar symptoms. Unknown inciting event. No injury or trauma to the eyes or face. No change in vision. Patient also states COVID symptoms in the triage note however patient states she is just having some nasal congestion and rhinorrhea. Possible mild cough. No sputum production, increased work of breathing, wheezing, stridor or respiratory distress. No associated chest pain, palpitations or syncope. Also reports a rash under her right breast which she has had for "a long time."  States there is also a lump associated with it. Reports that she was being monitored for breast cancer and states that they told her she had a lump there but patient just noticed it recently.   Also complains of bilateral foot ulcerations which again she states that she has had for a long time. Reports she was recently in short-term rehab for this issue but was discharged about 2 weeks ago. States that was the last time that the bandages have been changed. Has not looked at her feet recently. No associated pain in the feet, paresthesias, or anesthesias. Uncertain if legs/feet are swollen or erythematous. Also reports that she has been soiling herself as she has not been able to get up to use the bathroom. Reports it is because her legs feel "stiff and weak."  Denies progressive weakness or progressive proximal weakness. No weakness of her upper extremities. No back pain or involuntary incontinence. Also complains of increased urinary frequency and foul-smelling urine. No associated dysuria, hematuria or urinary urgency. Denies any other specific complaints today. Specifically denies fevers, chills, sore throat, ear complaints, headache, confusion, abdominal pain, nausea, vomiting, diarrhea, and any other complaint. States she is homeless. Per triage note patient was significantly soiled with stool and urine upon arrival.            Prior to Admission Medications   Prescriptions Last Dose Informant Patient Reported? Taking?    Blood Glucose Monitoring Suppl (OneTouch Verio Reflect) w/Device KIT   No No   Sig: Use 1 each 4 (four) times a day   Patient not taking: Reported on 9/11/2023   Blood Pressure Monitor KIT   No No   Sig: Check blood pressures BID   Patient not taking: Reported on 9/11/2023   Continuous Blood Gluc  (FreeStyle Denisa 2 [place]) ERIC   No No   Sig: Use 1 each 4 (four) times a day   Patient not taking: Reported on 9/11/2023   Continuous Blood Gluc Sensor (FreeStyle Denisa 2 Sensor) [facility] [place]   [place] No   Sig: Use 1 each every 14 (fourteen) days   Patient not taking: Reported on 9/11/2023   Emollient (eucerin) lotion   No No   Sig: Apply topically 2 (two) times a day = to B/L LE   Fluticasone-Salmeterol (Advair) 250-50 mcg/dose inhaler  Outside Facility (Specify) Yes Yes   Sig: Inhale 1 puff 2 (two) times a day Rinse mouth after use. Insulin Pen Needle (BD Pen Needle Tita 2nd Gen) 32G X 4 MM MISC   No No   Sig: For use with insulin pen. Pharmacy may dispense brand covered by insurance. Patient not taking: Reported on 9/11/2023   Insulin Pen Needle (BD Pen Needle Tita U/F) 32G X 4 MM MISC   No No   Sig: Use four times daily as directed with insulin pen   Patient not taking: Reported on 9/11/2023   Lancets (OneTouch Delica Plus [id number]) MISC   No No   Sig: Use 1 each 4 (four) times a day   Patient not taking: Reported on 9/11/2023   Petrolatum-Zinc Oxide 57-17 % PSTE   Yes No   Sig: Apply 1 application. topically 3 (three) times a day To sacral area - protective   acetaminophen (TYLENOL) 325 mg tablet   Yes No   Sig: Take 650 mg by mouth every 6 (six) hours as needed for mild pain   amLODIPine (NORVASC) 5 mg tablet   No Yes   Sig: Take 1 tablet (5 mg total) by mouth daily   apixaban (ELIQUIS) 5 mg   No No   Sig: Take 2 tablets (10 mg total) by mouth 2 (two) times a day for 3 doses   apixaban (ELIQUIS) 5 mg   No Yes   Sig: Take 1 tablet (5 mg total) by mouth 2 (two) times a day Do not start before August 17, 2023. atorvastatin (LIPITOR) 40 mg tablet   No Yes   Sig: Take 1 tablet (40 mg total) by mouth daily   budesonide-formoterol (Symbicort) 160-4.5 mcg/act inhaler   No No   Sig: Inhale 1 puff 2 (two) times a day Rinse mouth after use.    Patient not taking: Reported on 9/11/2023   cholecalciferol (VITAMIN D3) 1,000 units tablet   No Yes   Sig: Take 2 tablets (2,000 Units total) by mouth daily   docusate sodium (COLACE) 100 mg capsule   No Yes   Sig: Take 1 capsule (100 mg total) by mouth 2 (two) times a day   furosemide (LASIX) 20 mg tablet   No Yes   Sig: Take 1 tablet (20 mg total) by mouth 2 (two) times a day   gabapentin (NEURONTIN) 100 mg capsule   No Yes   Sig: Take 1 capsule (100 mg total) by mouth 2 (two) times a day   hydrALAZINE (APRESOLINE) 10 mg tablet   No Yes   Sig: Take 1 tablet (10 mg total) by mouth 3 (three) times a day   insulin glargine (LANTUS) 100 units/mL subcutaneous injection  Outside Facility (Specify) No Yes   Sig: Inject 25 Units under the skin every 12 (twelve) hours   Patient taking differently: Inject 18 Units under the skin every 12 (twelve) hours   insulin lispro (HumaLOG) 100 units/mL injection   No No   Sig: Inject 5 Units under the skin 3 (three) times a day with meals   loperamide (IMODIUM) 2 mg capsule   No Yes   Sig: Take 1 capsule (2 mg total) by mouth 4 (four) times a day as needed for diarrhea (can have up to 8mg per day)   losartan (COZAAR) 100 MG tablet   No Yes   Sig: Take 1 tablet (100 mg total) by mouth daily   menthol-methyl salicylate (BENGAY) 08-81 % cream   No No   Sig: Apply topically 4 (four) times a day as needed (myalgia)   metFORMIN (GLUCOPHAGE) 1000 MG tablet   No Yes   Sig: Take 1 tablet (1,000 mg total) by mouth 2 (two) times a day with meals   metoprolol tartrate (LOPRESSOR) 100 mg tablet   No Yes   Sig: Take 1 tablet (100 mg total) by mouth every 12 (twelve) hours   multivitamin-iron-minerals-folic acid (THERAPEUTIC-M) TABS tablet   No No   Sig: Take 1 tablet by mouth daily   predniSONE 1 mg tablet   No Yes   Sig: Take 4 tablets (4 mg total) by mouth daily   topiramate (TOPAMAX) 100 mg tablet   No Yes   Sig: Take 1 tablet (100 mg total) by mouth daily at bedtime   Patient taking differently: Take 100 mg by mouth daily at bedtime      Facility-Administered Medications: None       Past Medical History:   Diagnosis Date   • Anemia    • Benign hypertension     Procedure/Onset: 01/01/2005; Description: BP elevated today. Pt reports running out of BP meds 2wks ago. Will resume BP meds.    • Diabetes mellitus ([address])    • Diabetes mellitus type 2 with complications, uncontrolled 9/8/2015   • Dyspnea on exertion    • Hyperlipidemia    • Hypertension    • Legally blind 2010   • Miscarriage     x 3   • Polymyalgia rheumatica ([facility]) 11/24/2021   • Seizures (HCC)    • Sickle cell trait (HCC)    • Stage 3a chronic kidney disease ([facility]) 5/19/2022   • Thyroid nodule 3/6/2020       Past Surgical History:   Procedure Laterality Date   • CATARACT EXTRACTION Bilateral     august and september   • EYE SURGERY     • HYSTERECTOMY      37   • OOPHORECTOMY Left     37   • HI LIGATION/BIOPSY TEMPORAL ARTERY Bilateral 10/17/2019    Procedure: BIOPSY ARTERY TEMPORAL;  Surgeon: Kvng Loyd DO;  Location: [facility];  Service: Vascular   • US GUIDED THYROID BIOPSY  5/13/2020       Family History   Problem Relation Age of Onset   • Heart attack Mother    • Heart disease Mother    • Hypertension Mother    • No Known Problems Father    • Heart disease Sister    • No Known Problems Brother    • No Known Problems Son    • No Known Problems Daughter    • Heart attack Maternal Grandmother    • Heart disease Maternal Grandmother    • Diabetes Other    • Heart attack Other    • No Known Problems Sister    • No Known Problems Sister    • No Known Problems Paternal Aunt    • No Known Problems Son    • Cancer Neg Hx    • Stroke Neg Hx      I have reviewed and agree with the history as documented. E-Cigarette/Vaping   • E-Cigarette Use Never User      E-Cigarette/Vaping Substances     Social History     Tobacco Use   • Smoking status: Never   • Smokeless tobacco: Never   Vaping Use   • Vaping Use: Never used   Substance Use Topics   • Alcohol use: Never     Alcohol/week: 0.0 standard drinks of alcohol   • Drug use: No       Review of Systems   HENT: Positive for congestion and rhinorrhea. Eyes: Positive for discharge and redness. Negative for pain, itching and visual disturbance. Respiratory: Positive for cough. Cardiovascular: Negative for chest pain and palpitations. Genitourinary: Positive for frequency.         (+) Foul smelling urine    Musculoskeletal:        (+) B/L foot ulcerations/wounds  (+) B/L Leg stiffness and weakness   Skin: Positive for rash (Under R breast ). Neurological: Negative for headaches. Psychiatric/Behavioral: Negative for confusion. Physical Exam  Physical Exam  Vitals and nursing note reviewed. Exam conducted with a chaperone present Claudia Fuentes RN ). Constitutional:       General: She is not in acute distress. Appearance: She is well-developed. Comments: Awake, alert, interactive and resting in the stretcher in no acute distress. Chronically ill appearing but not toxic appearing. HENT:      Head: Normocephalic and atraumatic. Mouth/Throat:      Mouth: Mucous membranes are moist.   Eyes:      Conjunctiva/sclera: Conjunctivae normal.      Comments: Significant conjunctival erythema bilaterally with associated yellow discharge. (+) Chemosis and blepharitis. No periorbital erythema, swelling or TTP. Full, painless EOMI. PERRL. Cardiovascular:      Rate and Rhythm: Regular rhythm. Tachycardia present. Heart sounds: No murmur heard. Pulmonary:      Effort: Pulmonary effort is normal. No respiratory distress. Breath sounds: Normal breath sounds. Abdominal:      Palpations: Abdomen is soft. Tenderness: There is no abdominal tenderness. Musculoskeletal:         General: No swelling. Cervical back: Neck supple. Comments: Findings over B/L LE consistent with stasis dermatitis. There is also associated erythema and warmth R>L. No discharge or weeping or associated TTP. There are also ulcerations over the plantar aspects of both feet, R>L. Please refer to attached pictures. No purulent discharge, tunneling, TTP or crepitus appreciated. Sensation and motor intact in B/L LE. DP pulses easily dopplerable b/l. Strength equal and intact B/L. Skin:     General: Skin is warm and dry. Capillary Refill: Capillary refill takes less than 2 seconds. Neurological:      General: No focal deficit present.       Mental Status: She is alert and oriented to person, place, and time.      GCS: GCS eye subscore is 4. GCS verbal subscore is 5. GCS motor subscore is 6.    Psychiatric:         Mood and Affect: Mood normal.                             Vital Signs  ED Triage Vitals   Temperature Pulse Respirations Blood Pressure SpO2   09/23/23 2253 09/23/23 2253 09/23/23 2253 09/23/23 2253 09/23/23 2253   98.1 °F (36.7 °C) (!) 131 16 146/80 95 %      Temp Source Heart Rate Source Patient Position - Orthostatic VS BP Location FiO2 (%)   09/23/23 2253 09/23/23 2253 09/23/23 2253 09/23/23 2253 --   Oral Monitor Lying Right arm       Pain Score       09/24/23 0831       No Pain           Vitals:    09/25/23 0948 09/25/23 1526 09/25/23 2213 09/26/23 0701   BP: 170/87 139/63 134/70 (!) 171/103   Pulse: 70 62 66 68   Patient Position - Orthostatic VS:   Lying          Visual Acuity  Visual Acuity    Flowsheet Row Most Recent Value   L Pupil Size (mm) 2   R Pupil Size (mm) 2   L Pupil Shape Round   R Pupil Shape Round          ED Medications  Medications   amLODIPine (NORVASC) tablet 5 mg (5 mg Oral Given 9/26/23 0827)   apixaban (ELIQUIS) tablet 5 mg (5 mg Oral Given 9/26/23 0827)   atorvastatin (LIPITOR) tablet 40 mg (40 mg Oral Given 9/26/23 0827)   fluticasone-vilanterol 200-25 mcg/actuation 1 puff (1 puff Inhalation Given 9/26/23 0828)   gabapentin (NEURONTIN) capsule 100 mg (100 mg Oral Given 9/26/23 0827)   hydrALAZINE (APRESOLINE) tablet 10 mg (10 mg Oral Given 9/26/23 0827)   insulin glargine (LANTUS) subcutaneous injection 18 Units 0.18 mL (18 Units Subcutaneous Given 9/26/23 0825)   insulin lispro (HumaLOG) 100 units/mL subcutaneous injection 5 Units (5 Units Subcutaneous Given 9/26/23 0825)   losartan (COZAAR) tablet 100 mg (100 mg Oral Given 9/26/23 0827)   metoprolol tartrate (LOPRESSOR) tablet 100 mg (100 mg Oral Given 9/26/23 0820)   topiramate (TOPAMAX) tablet 100 mg (100 mg Oral Given 9/25/23 5212)   predniSONE tablet 4 mg (4 mg Oral Given 9/26/23 0801)   nystatin (MYCOSTATIN) powder ( Topical Given 9/26/23 0828)   multi-electrolyte (PLASMALYTE-A/ISOLYTE-S PH 7.4) IV solution (100 mL/hr Intravenous New Bag 9/25/23 1734)   acetaminophen (TYLENOL) tablet 650 mg (650 mg Oral Given 9/25/23 0021)   aluminum-magnesium hydroxide-simethicone (MAALOX) oral suspension 30 mL (has no administration in time range)   piperacillin-tazobactam (ZOSYN) IVPB 3.375 g (3.375 g Intravenous New Bag 9/26/23 0825)   erythromycin (ILOTYCIN) 0.5 % ophthalmic ointment 0.5 inch (0.5 inches Both Eyes Given 9/26/23 0707)   insulin lispro (HumaLOG) 100 units/mL subcutaneous injection 2-12 Units ( Subcutaneous Not Given 9/26/23 0731)   insulin lispro (HumaLOG) 100 units/mL subcutaneous injection 2-12 Units (0 Units Subcutaneous Hold 9/25/23 2248)   lidocaine (LIDODERM) 5 % patch 1 patch (1 patch Topical Medication Applied 9/26/23 0827)   oxyCODONE (ROXICODONE) IR tablet 5 mg (5 mg Oral Refused 9/25/23 0228)   oxyCODONE (ROXICODONE) split tablet 2.5 mg (has no administration in time range)   erythromycin (ILOTYCIN) 0.5 % ophthalmic ointment (1 inch Both Eyes Given 9/24/23 0037)   nystatin (MYCOSTATIN) powder (1 Application Topical Given 9/24/23 0039)   sodium chloride 0.9 % bolus 1,000 mL (0 mL Intravenous Stopped 9/24/23 0423)   vancomycin (VANCOCIN) 1,750 mg in sodium chloride 0.9 % 500 mL IVPB (0 mg Intravenous Stopped 9/24/23 0445)   piperacillin-tazobactam (ZOSYN) 4.5 g in sodium chloride 0.9 % 100 mL IVPB (0 g Intravenous Stopped 9/24/23 0541)   iohexol (OMNIPAQUE) 350 MG/ML injection (MULTI-DOSE) 100 mL (100 mL Intravenous Given 9/24/23 0359)   sodium chloride 0.9 % bolus 1,000 mL (0 mL Intravenous Stopped 9/24/23 0713)   multi-electrolyte (ISOLYTE-S PH 7.4) bolus 1,000 mL (0 mL Intravenous Stopped 9/24/23 0830)   magnesium sulfate 2 g/50 mL IVPB (premix) 2 g (2 g Intravenous New Bag 9/25/23 0826)       Diagnostic Studies  Results Reviewed     Procedure Component Value Units Date/Time    Blood culture #1 [[id number]] Collected: 09/24/23 0026    Lab Status: Preliminary result Specimen: Blood from Arm, Left Updated: 09/26/23 0801     Blood Culture No Growth at 48 hrs. Blood culture #2 [[id number]] Collected: 09/24/23 0026    Lab Status: Preliminary result Specimen: Blood from Arm, Right Updated: 09/26/23 0801     Blood Culture No Growth at 48 hrs.     MRSA culture [[id number]]  (Normal) Collected: 09/24/23 0830    Lab Status: Final result Specimen: Nares from Nose Updated: 09/25/23 2115     MRSA Culture Only No Methicillin Resistant Staphlyococcus aureus (MRSA) isolated    Vancomycin, random [[id number]]  (Normal) Collected: 09/25/23 0704    Lab Status: Final result Specimen: Blood from Arm, Left Updated: 09/25/23 0745     Vancomycin Rm 14.5 ug/mL     Magnesium [[id number]]  (Abnormal) Collected: 09/25/23 0704    Lab Status: Final result Specimen: Blood from Arm, Left Updated: 09/25/23 0745     Magnesium 1.7 mg/dL     Procalcitonin, Next Day AM Collection [[id number]]  (Normal) Collected: 09/25/23 0704    Lab Status: Final result Specimen: Blood from Arm, Left Updated: 09/25/23 0743     Procalcitonin 0.05 ng/ml     Comprehensive metabolic panel [[id number]]  (Abnormal) Collected: 09/24/23 0830    Lab Status: Final result Specimen: Blood from Arm, Right Updated: 09/24/23 7610     Sodium 134 mmol/L      Potassium 4.1 mmol/L      Chloride 105 mmol/L      CO2 25 mmol/L      ANION GAP 4 mmol/L      BUN 16 mg/dL      Creatinine 0.85 mg/dL      Glucose 280 mg/dL      Calcium 7.9 mg/dL      Corrected Calcium 9.1 mg/dL      AST 8 U/L      ALT 6 U/L      Alkaline Phosphatase 106 U/L      Total Protein 5.9 g/dL      Albumin 2.5 g/dL      Total Bilirubin 0.46 mg/dL      eGFR 72 ml/min/1.73sq m     Narrative:      [place] guidelines for Chronic Kidney Disease (CKD):   •  Stage 1 with normal or high GFR (GFR > 90 mL/min/1.73 square meters)  •  Stage 2 Mild CKD (GFR = 60-89 mL/min/1.73 square meters)  •  Stage 3A Moderate CKD (GFR = 45-59 mL/min/1.73 square meters)  •  Stage 3B Moderate CKD (GFR = 30-44 mL/min/1.73 square meters)  •  Stage 4 Severe CKD (GFR = 15-29 mL/min/1.73 square meters)  •  Stage 5 End Stage CKD (GFR <15 mL/min/1.73 square meters)  Note: GFR calculation is accurate only with a steady state creatinine    Fingerstick Glucose (POCT) [[id number]]  (Abnormal) Collected: 09/24/23 0911    Lab Status: Final result Updated: 09/24/23 0912     POC Glucose 285 mg/dl     Procalcitonin [[id number]]  (Normal) Collected: 09/24/23 0830    Lab Status: Final result Specimen: Blood from Arm, Right Updated: 09/24/23 0907     Procalcitonin 0.07 ng/ml     CBC and differential [[id number]]  (Abnormal) Collected: 09/24/23 0830    Lab Status: Final result Specimen: Blood from Arm, Right Updated: 09/24/23 0844     WBC 14.84 Thousand/uL      RBC 3.52 Million/uL      Hemoglobin 8.3 g/dL      Hematocrit 27.9 %      MCV 79 fL      MCH 23.6 pg      MCHC 29.7 g/dL      RDW 16.7 %      MPV 10.6 fL      Platelets 775 Thousands/uL      nRBC 0 /100 WBCs      Neutrophils Relative 76 %      Immat GRANS % 1 %      Lymphocytes Relative 14 %      Monocytes Relative 7 %      Eosinophils Relative 2 %      Basophils Relative 0 %      Neutrophils Absolute 11.35 Thousands/µL      Immature Grans Absolute 0.10 Thousand/uL      Lymphocytes Absolute 2.06 Thousands/µL      Monocytes Absolute 1.00 Thousand/µL      Eosinophils Absolute 0.28 Thousand/µL      Basophils Absolute 0.05 Thousands/µL     Lactic acid 2 Hours [[id number]]  (Normal) Collected: 09/24/23 0348    Lab Status: Final result Specimen: Blood from Arm, Right Updated: 09/24/23 0417     LACTIC ACID 1.5 mmol/L     Narrative:      Result may be elevated if tourniquet was used during collection.     C-reactive protein [[id number]]  (Abnormal) Collected: 09/24/23 0026    Lab Status: Final result Specimen: Blood from Arm, Left Updated: 09/24/23 0234     .8 mg/L     Comprehensive metabolic panel [[id number]] (Abnormal) Collected: 09/24/23 0026    Lab Status: Final result Specimen: Blood from Arm, Left Updated: 09/24/23 1144     Sodium 137 mmol/L      Potassium 3.8 mmol/L      Chloride 101 mmol/L      CO2 26 mmol/L      ANION GAP 10 mmol/L      BUN 20 mg/dL      Creatinine 1.20 mg/dL      Glucose 378 mg/dL      Calcium 9.2 mg/dL      Corrected Calcium 9.8 mg/dL      AST 7 U/L      ALT 7 U/L      Alkaline Phosphatase 140 U/L      Total Protein 7.6 g/dL      Albumin 3.2 g/dL      Total Bilirubin 0.40 mg/dL      eGFR 47 ml/min/1.73sq m     Narrative:      [facility] guidelines for Chronic Kidney Disease (CKD):   •  Stage 1 with normal or high GFR (GFR > 90 mL/min/1.73 square meters)  •  Stage 2 Mild CKD (GFR = 60-89 mL/min/1.73 square meters)  •  Stage 3A Moderate CKD (GFR = 45-59 mL/min/1.73 square meters)  •  Stage 3B Moderate CKD (GFR = 30-44 mL/min/1.73 square meters)  •  Stage 4 Severe CKD (GFR = 15-29 mL/min/1.73 square meters)  •  Stage 5 End Stage CKD (GFR <15 mL/min/1.73 square meters)  Note: GFR calculation is accurate only with a steady state creatinine    Fingerstick Glucose (POCT) [[id number]]  (Abnormal) Collected: 09/24/23 0132    Lab Status: Final result Updated: 09/24/23 0134     POC Glucose 324 mg/dl     FLU/RSV/COVID - if FLU/RSV clinically relevant [[id number]]  (Normal) Collected: 09/24/23 0031    Lab Status: Final result Specimen: Nares from Nose Updated: 09/24/23 0127     SARS-CoV-2 Negative     INFLUENZA A PCR Negative     INFLUENZA B PCR Negative     RSV PCR Negative    Narrative:      FOR PEDIATRIC PATIENTS - copy/paste COVID Guidelines URL to browser: [email or web address]    SARS-CoV-2 assay is a Nucleic Acid Amplification assay intended for the  qualitative detection of nucleic acid from SARS-CoV-2 in nasopharyngeal  swabs. Results are for the presumptive identification of SARS-CoV-2 RNA.     Positive results are indicative of infection with SARS-CoV-2, the virus  causing COVID-19, but do not rule out bacterial infection or co-infection  with other viruses. Laboratories within the [facility] and its  territories are required to report all positive results to the appropriate  public health authorities. Negative results do not preclude SARS-CoV-2  infection and should not be used as the sole basis for treatment or other  patient management decisions. Negative results must be combined with  clinical observations, patient history, and epidemiological information. This test has not been FDA cleared or approved. This test has been authorized by FDA under an Emergency Use Authorization  (EUA). This test is only authorized for the duration of time the  declaration that circumstances exist justifying the authorization of the  emergency use of an in vitro diagnostic tests for detection of SARS-CoV-2  virus and/or diagnosis of COVID-19 infection under section 564(b)(1) of  the Act, 21 U. S.C. 833JQR-4(L)(0), unless the authorization is terminated  or revoked sooner. The test has been validated but independent review by FDA  and CLIA is pending. Test performed using [employer] GeneXpert: This RT-PCR assay targets N2,  a region unique to SARS-CoV-2. A conserved region in the E-gene was chosen  for pan-Sarbecovirus detection which includes SARS-CoV-2. According to CMS-2020-01-R, this platform meets the definition of high-throughput technology.     Sedimentation rate, automated [[id number]]  (Abnormal) Collected: 09/24/23 0026    Lab Status: Final result Specimen: Blood from Arm, Left Updated: 09/24/23 0116     Sed Rate 73 mm/hour     Procalcitonin [[id number]]  (Normal) Collected: 09/24/23 0026    Lab Status: Final result Specimen: Blood from Arm, Left Updated: 09/24/23 0114     Procalcitonin 0.16 ng/ml     Lactic acid [[id number]]  (Abnormal) Collected: 09/24/23 0026    Lab Status: Final result Specimen: Blood from Arm, Left Updated: 09/24/23 0112     LACTIC ACID 3.7 mmol/L     Narrative:      Result may be elevated if tourniquet was used during collection. Protime-INR [[id number]]  (Normal) Collected: 09/24/23 0026    Lab Status: Final result Specimen: Blood from Arm, Left Updated: 09/24/23 0101     Protime 13.6 seconds      INR 1.04    APTT [[id number]]  (Normal) Collected: 09/24/23 0026    Lab Status: Final result Specimen: Blood from Arm, Left Updated: 09/24/23 0101     PTT 37 seconds     CBC and differential [[id number]]  (Abnormal) Collected: 09/24/23 0026    Lab Status: Final result Specimen: Blood from Arm, Left Updated: 09/24/23 0048     WBC 16.10 Thousand/uL      RBC 4.37 Million/uL      Hemoglobin 10.3 g/dL      Hematocrit 34.9 %      MCV 80 fL      MCH 23.6 pg      MCHC 29.5 g/dL      RDW 16.6 %      MPV 11.1 fL      Platelets 725 Thousands/uL      nRBC 0 /100 WBCs      Neutrophils Relative 82 %      Immat GRANS % 1 %      Lymphocytes Relative 11 %      Monocytes Relative 5 %      Eosinophils Relative 1 %      Basophils Relative 0 %      Neutrophils Absolute 13.08 Thousands/µL      Immature Grans Absolute 0.12 Thousand/uL      Lymphocytes Absolute 1.83 Thousands/µL      Monocytes Absolute 0.83 Thousand/µL      Eosinophils Absolute 0.18 Thousand/µL      Basophils Absolute 0.06 Thousands/µL     UA w Reflex to Microscopic w Reflex to Culture [[id number]]     Lab Status: No result Specimen: Urine                  CT lower extremity w contrast right   Final Result by Ignacio Price MD (09/24 0820)   Right lower extremity subcutaneous edema consistent with nonspecific cellulitis without evidence of drainable abscess collection. No soft tissue emphysema or osseous destructive change to indicate osteomyelitis.       Concordant virtual radiologic report was provided at 0435 hours on 9/24/2023         Workstation performed: [id number]         CT lower extremity w contrast left   Final Result by Ignacio Price MD (09/24 0818)   Left lower extremity diffuse subcutaneous edema indicating nonspecific cellulitis without drainable abscess collection, soft tissue emphysema or osseous destructive change to indicate osteomyelitis. Concordant virtual radiologic report was provided at 0439 hours on 9/24/2023         Workstation performed: [id number]         XR chest 1 view portable   ED Interpretation by Halie De Leon PA-C (09/24 0236)   ED wet read:  No acute cardiopulmonary disease appreciated. Final Result by Tucker Palmer MD (09/24 3927)      No acute cardiopulmonary disease. Workstation performed: [id number]                    Procedures  ECG 12 Lead Documentation Only    Date/Time: 9/24/2023 12:19 AM    Performed by: Halie De Leon PA-C  Authorized by: Halie De Leon PA-C    Indications / Diagnosis:  Tachycardia   ECG reviewed by me, the ED Provider: yes    Patient location:  ED  Rate:     ECG rate:  120    ECG rate assessment: normal    Rhythm:     Rhythm: sinus rhythm    Ectopy:     Ectopy: none    QRS:     QRS axis:  Normal    QRS intervals:  Normal  Conduction:     Conduction: normal    ST segments:     ST segments:  Normal  T waves:     T waves: normal               ED Course  ED Course as of 09/26/23 0859   Sun Sep 24, 2023   0143 POC Glucose(!): 324   0145 Sed Rate(!): 73  Will correlate to CT imaging. 0145 Procalcitonin: 0.16   0145 POCT INR: 1.04   0145 PTT: 37   0145 WBC(!): 16.10  Will correlate with CT scan. Similar to previous values. 0146 Hemoglobin(!): 10.3  Similar to previous values. 0146 LACTIC ACID(!!): 3.7  Receiving 1 L NS.   0146 FLU/RSV/COVID - if FLU/RSV clinically relevant  Negative   0233 Glucose, Random(!): 378  History of DM. No AG. Received 1 L NS.   0235 No acute cardiopulmonary disease appreciated on CXR, pending official read. 3760 C-REACTIVE PROTEIN(!): 129.8  Will correlate to CT imaging.    8448 LACTIC ACID: 1.5  Improved   0522 Per vRad report CT L impression:     IMPRESSION:  Diffuse subcutaneous edema again likely cellulitis. No air is seen in the soft tissues. Ulceration is  present. No definite osteomyelitis noted     0525 Per v Rad report CT R     IMPRESSION:  Diffuse subcutaneous edema, no evidence of air or abscess, correlate clinically for cellulitis. 2600 In light of CT findings with concern for cellulitis and patient meeting criteria for severe sepsis will plan to admit patient to [facility]. Patient agreeable to this plan. TT sent to on-call for admission. [facility] accepts pt as inpatient under lynsey Hua. Pt stable at admission. Initial Sepsis Screening     Row Name 09/24/23 0239 09/24/23 0140             Is the patient's history suggestive of a new or worsening infection? Yes (Proceed)  -OB Yes (Proceed)  -OB       Suspected source of infection soft tissue  -OB soft tissue  -OB       Indicate SIRS criteria Tachycardia > 90 bpm;Tachypnea > 20 resp per min;Leukocytosis (WBC > 67960 IJL) OR Leukopenia (WBC <4000 IJL) OR Bandemia (WBC >10% bands)  -OB Tachycardia > 90 bpm;Tachypnea > 20 resp per min;Leukocytosis (WBC > 61389 IJL) OR Leukopenia (WBC <4000 IJL) OR Bandemia (WBC >10% bands)  -OB       Are two or more of the above signs & symptoms of infection both present and new to the patient? Yes (Proceed)  -OB Yes (Proceed)  -OB       Assess for evidence of organ dysfunction: Are any of the below criteria present within 6 hours of suspected infection and SIRS criteria that are NOT considered to be chronic conditions? Lactate > 2.0  -OB Lactate > 2.0  -OB       Date of presentation of severe sepsis 09/24/23  -OB --       Time of presentation of severe sepsis 0026  -OB --       Sepsis Note: Click "NEXT" below (NOT "close") to generate sepsis note based on above information.  YES (proceed by clicking "NEXT")  -OB --             User Key  (r) = Recorded By, (t) = Taken By, (c) = Cosigned By    [address] [place] [address] Name Provider Type OB Ainsley Guardado PA-C Physician Assistant                SBIRT 22yo+    Flowsheet Row Most Recent Value   Initial Alcohol Screen: US AUDIT-C     1. How often do you have a drink containing alcohol? 0 Filed at: 09/24/2023 0700   2. How many drinks containing alcohol do you have on a typical day you are drinking? 0 Filed at: 09/24/2023 0700   3b. FEMALE Any Age, or MALE 65+: How often do you have 4 or more drinks on one occassion? 0 Filed at: 09/24/2023 0700   Audit-C Score 0 Filed at: 09/24/2023 0700   JANIE: How many times in the past year have you. .. Used an illegal drug or used a prescription medication for non-medical reasons? Never Filed at: 09/24/2023 0700                    Medical Decision Making  67-year-old female with a extensive past medical history presents ED with multiple complaints. Patient has been having erythematous eyes with discharge over the last few weeks. No other specific complaints related to this. No close contacts with similar. Also has had some nasal congestion, rhinorrhea and mild cough. No sputum production, increased work of breathing, wheezing, stridor or respiratory distress. Also complains of bilateral foot ulcerations which has had for a long time now. Reports she was just released from short-term facility 2 weeks ago, has not changed the dressing since. Lastly has been soiling herself that she has been unable to stand up due to bilateral leg stiffness/weakness. No involuntary incontinence or associated back pain. No fevers or chills. Patient reports she has been taking her Xarelto as prescribed. No complaints of chest pain, shortness of breath, palpitations, lightheadedness or syncope. PE findings as outlined in the PE section. Eye findings likely bacterial conjunctivitis. Rash under left breast appears to be intertrigo possible candidal component.   Will provide erythromycin ointment to the eyes and nystatin under the right breast.  In light of patient being significantly tachycardic on arrival will obtain infectious labs such as CBC, CMP, PT/INR, APTT, procalcitonin, blood cultures and lactic acid. ESR and CRP in light of concerning foot ulcerations with concern for infection, possible osteomyelitis. Will obtain CT of both lower extremities in light of concerning ulcerations with associated possible swelling and erythema/warmth over B/L LE.  CXR in light of patient presenting with tachycardia and reports of cough. Will test patient for COVID/flu/RSV. UA in light of complaints of foul-smelling urine and urinary frequency. EKG in light of patient being tachycardic on arrival otherwise no complaints of CP, SOB, palpitations or lightheadedness. No complaints of pain at this time. We will treat supportively with 1 L NS. Patient will likely require admission which she is agreeable to at this time. We will reevaluate above-stated plan. Bacterial conjunctivitis: acute illness or injury  Cellulitis: acute illness or injury  Cough: acute illness or injury  Homelessness: chronic illness or injury  Intertrigo: acute illness or injury  Severe sepsis ([address]): acute illness or injury  Amount and/or Complexity of Data Reviewed  Labs: ordered. Decision-making details documented in ED Course. Radiology: ordered and independent interpretation performed. Risk  Prescription drug management.           Disposition  Final diagnoses:   Bacterial conjunctivitis   Intertrigo   Cough   Homelessness   Cellulitis   Severe sepsis ([address])     Time reflects when diagnosis was documented in both MDM as applicable and the Disposition within this note     Time User Action Codes Description Comment    9/24/2023 12:03 AM Emily West Add [H10.9] Bacterial conjunctivitis     9/24/2023 12:03 AM Ruben Hebert Add [L30.4] Intertrigo     9/24/2023 12:03 AM Emily West Add [R05.9] Cough     9/24/2023  2:43 AM Emily West Add [Z59.00] Homelessness     9/24/2023  2:43 AM Sue Music Add [M72.21] Cellulitis     9/24/2023  2:43 AM Brurenettadt, Marcelle Grumbling Remove [A87.79] Cellulitis     9/24/2023  2:43 AM Sue Music Add [N54.79] Cellulitis     9/24/2023  5:31 AM Brukardt, Marcelle Grumbling Add [A41.9,  R65.20] Severe sepsis ([address])     9/24/2023  6:40 AM Maty Keto Add [R64.228,  S59.286] Diabetic foot ulcer ([address])     9/24/2023  6:41 AM Maty Keto Add [I29.053] Cellulitis of lower extremity, unspecified laterality       ED Disposition     None      Follow-up Information    None         Current Discharge Medication List      CONTINUE these medications which have NOT CHANGED    Details   amLODIPine (NORVASC) 5 mg tablet Take 1 tablet (5 mg total) by mouth daily  Qty: 30 tablet, Refills: 3    Associated Diagnoses: Primary hypertension      apixaban (ELIQUIS) 5 mg Take 1 tablet (5 mg total) by mouth 2 (two) times a day Do not start before August 17, 2023. Refills: 0    Associated Diagnoses: Acute pulmonary embolism, unspecified pulmonary embolism type, unspecified whether acute cor pulmonale present ([facility])      atorvastatin (LIPITOR) 40 mg tablet Take 1 tablet (40 mg total) by mouth daily  Qty: 90 tablet, Refills: 3    Comments: Requesting 1 year supply  Associated Diagnoses: Other hyperlipidemia      cholecalciferol (VITAMIN D3) 1,000 units tablet Take 2 tablets (2,000 Units total) by mouth daily  Qty: 60 tablet, Refills: 2    Associated Diagnoses: Polymyalgia rheumatica (HCC)      docusate sodium (COLACE) 100 mg capsule Take 1 capsule (100 mg total) by mouth 2 (two) times a day  Qty: 60 capsule, Refills: 0    Associated Diagnoses: Ambulatory dysfunction      Fluticasone-Salmeterol (Advair) 250-50 mcg/dose inhaler Inhale 1 puff 2 (two) times a day Rinse mouth after use.       furosemide (LASIX) 20 mg tablet Take 1 tablet (20 mg total) by mouth 2 (two) times a day  Qty: 60 tablet, Refills: 3    Associated Diagnoses: Primary hypertension      gabapentin (NEURONTIN) 100 mg capsule Take 1 capsule (100 mg total) by mouth 2 (two) times a day  Qty: 60 capsule, Refills: 3    Associated Diagnoses: Seizures (HCC)      hydrALAZINE (APRESOLINE) 10 mg tablet Take 1 tablet (10 mg total) by mouth 3 (three) times a day  Qty: 90 tablet, Refills: 3    Associated Diagnoses: Primary hypertension      insulin glargine (LANTUS) 100 units/mL subcutaneous injection Inject 25 Units under the skin every 12 (twelve) hours  Qty: 15 mL, Refills: 3    Associated Diagnoses: Diabetes mellitus (HCC)      loperamide (IMODIUM) 2 mg capsule Take 1 capsule (2 mg total) by mouth 4 (four) times a day as needed for diarrhea (can have up to 8mg per day)  Qty: 30 capsule, Refills: 0    Associated Diagnoses: Diarrhea of infectious origin      losartan (COZAAR) 100 MG tablet Take 1 tablet (100 mg total) by mouth daily  Qty: 30 tablet, Refills: 5    Comments: Requesting 1 year supply  Associated Diagnoses: Primary hypertension      metFORMIN (GLUCOPHAGE) 1000 MG tablet Take 1 tablet (1,000 mg total) by mouth 2 (two) times a day with meals  Qty: 60 tablet, Refills: 3    Associated Diagnoses: Type 2 diabetes mellitus with hyperglycemia, with long-term current use of insulin (HCC)      metoprolol tartrate (LOPRESSOR) 100 mg tablet Take 1 tablet (100 mg total) by mouth every 12 (twelve) hours  Qty: 60 tablet, Refills: 3    Associated Diagnoses: Primary hypertension      predniSONE 1 mg tablet Take 4 tablets (4 mg total) by mouth daily  Qty: 120 tablet, Refills: 0    Associated Diagnoses: Polymyalgia rheumatica (HCC)      topiramate (TOPAMAX) 100 mg tablet Take 1 tablet (100 mg total) by mouth daily at bedtime  Qty: 30 tablet, Refills: 0    Associated Diagnoses: Seizures (HCC)      acetaminophen (TYLENOL) 325 mg tablet Take 650 mg by mouth every 6 (six) hours as needed for mild pain      Blood Glucose Monitoring Suppl (OneTouch Verio Reflect) w/Device KIT Use 1 each 4 (four) times a day  Qty: 1 kit, Refills: 0    Associated Diagnoses: Type 2 diabetes mellitus with hyperglycemia, with long-term current use of insulin ([facility])      Blood Pressure Monitor KIT Check blood pressures BID  Qty: 1 kit, Refills: 0    Associated Diagnoses: Hypertensive urgency      budesonide-formoterol (Symbicort) 160-4.5 mcg/act inhaler Inhale 1 puff 2 (two) times a day Rinse mouth after use. Qty: 10.2 g, Refills: 3    Associated Diagnoses: Mild intermittent asthma with exacerbation      Continuous Blood Gluc  (FreeStyle Denisa 2 Halls) ERIC Use 1 each 4 (four) times a day  Qty: 1 each, Refills: 0    Associated Diagnoses: Type 2 diabetes mellitus with hyperglycemia, with long-term current use of insulin ([facility])      Continuous Blood Gluc Sensor (FreeStyle Denisa 2 Sensor) MISC Use 1 each every 14 (fourteen) days  Qty: 2 each, Refills: 0    Associated Diagnoses: Type 2 diabetes mellitus with hyperglycemia, with long-term current use of insulin ([facility])      Emollient (eucerin) lotion Apply topically 2 (two) times a day = to B/L LE  Qty: 240 mL, Refills: 0    Associated Diagnoses: Polyneuropathy associated with underlying disease ([facility])      insulin lispro (HumaLOG) 100 units/mL injection Inject 5 Units under the skin 3 (three) times a day with meals  Qty: 4.5 mL, Refills: 0    Associated Diagnoses: Diabetes mellitus ([address])      ! ! Insulin Pen Needle (BD Pen Needle Tita 2nd Gen) 32G X 4 MM MISC For use with insulin pen. Pharmacy may dispense brand covered by insurance. Qty: 100 each, Refills: 0    Associated Diagnoses: Type 2 diabetes mellitus with hyperglycemia, with long-term current use of insulin ([address])      ! !  Insulin Pen Needle (BD Pen Needle Tita U/F) 32G X 4 MM MISC Use four times daily as directed with insulin pen  Qty: 200 each, Refills: 3    Associated Diagnoses: Type 2 diabetes mellitus with hyperglycemia, with long-term current use of insulin ([facility])      Lancets (OneTouch Delica Plus [id number]) MISC Use 1 each 4 (four) times a day  Qty: 200 each, Refills: 2    Associated Diagnoses: Type 2 diabetes mellitus with hyperglycemia, with long-term current use of insulin (HCC)      menthol-methyl salicylate (BENGAY) 00-41 % cream Apply topically 4 (four) times a day as needed (myalgia)  Refills: 0    Associated Diagnoses: Acute pulmonary embolism, unspecified pulmonary embolism type, unspecified whether acute cor pulmonale present (HCC)      multivitamin-iron-minerals-folic acid (THERAPEUTIC-M) TABS tablet Take 1 tablet by mouth daily  Qty: 30 tablet, Refills: 2    Associated Diagnoses: Iron deficiency anemia secondary to inadequate dietary iron intake      Petrolatum-Zinc Oxide 57-17 % PSTE Apply 1 application. topically 3 (three) times a day To sacral area - protective       !! - Potential duplicate medications found. Please discuss with provider. No discharge procedures on file.     PDMP Review       Value Time User    PDMP Reviewed  Yes 4/12/2023  1:16 AM Deny Mark PA-C          ED Provider  Electronically Signed by           Carolina Nash PA-C  09/26/23 9442

## 2023-09-24 NOTE — PLAN OF CARE
Problem: MOBILITY - ADULT  Goal: Maintain or return to baseline ADL function  Description: INTERVENTIONS:  -  Assess patient's ability to carry out ADLs; assess patient's baseline for ADL function and identify physical deficits which impact ability to perform ADLs (bathing, care of mouth/teeth, toileting, grooming, dressing, etc.)  - Assess/evaluate cause of self-care deficits   - Assess range of motion  - Assess patient's mobility; develop plan if impaired  - Assess patient's need for assistive devices and provide as appropriate  - Encourage maximum independence but intervene and supervise when necessary  - Involve family in performance of ADLs  - Assess for home care needs following discharge   - Consider OT consult to assist with ADL evaluation and planning for discharge  - Provide patient education as appropriate  Outcome: Progressing     Problem: PAIN - ADULT  Goal: Verbalizes/displays adequate comfort level or baseline comfort level  Description: Interventions:  - Encourage patient to monitor pain and request assistance  - Assess pain using appropriate pain scale  - Administer analgesics based on type and severity of pain and evaluate response  - Implement non-pharmacological measures as appropriate and evaluate response  - Consider cultural and social influences on pain and pain management  - Notify physician/advanced practitioner if interventions unsuccessful or patient reports new pain  Outcome: Progressing     Problem: INFECTION - ADULT  Goal: Absence or prevention of progression during hospitalization  Description: INTERVENTIONS:  - Assess and monitor for signs and symptoms of infection  - Monitor lab/diagnostic results  - Monitor all insertion sites, i.e. indwelling lines, tubes, and drains  - Monitor endotracheal if appropriate and nasal secretions for changes in amount and color  - [place] appropriate cooling/warming therapies per order  - Administer medications as ordered  - Instruct and encourage patient and family to use good hand hygiene technique  - Identify and instruct in appropriate isolation precautions for identified infection/condition  Outcome: Progressing     Problem: SAFETY ADULT  Goal: Maintain or return to baseline ADL function  Description: INTERVENTIONS:  -  Assess patient's ability to carry out ADLs; assess patient's baseline for ADL function and identify physical deficits which impact ability to perform ADLs (bathing, care of mouth/teeth, toileting, grooming, dressing, etc.)  - Assess/evaluate cause of self-care deficits   - Assess range of motion  - Assess patient's mobility; develop plan if impaired  - Assess patient's need for assistive devices and provide as appropriate  - Encourage maximum independence but intervene and supervise when necessary  - Involve family in performance of ADLs  - Assess for home care needs following discharge   - Consider OT consult to assist with ADL evaluation and planning for discharge  - Provide patient education as appropriate  Outcome: Progressing     Problem: DISCHARGE PLANNING  Goal: Discharge to home or other facility with appropriate resources  Description: INTERVENTIONS:  - Identify barriers to discharge w/patient and caregiver  - Arrange for needed discharge resources and transportation as appropriate  - Identify discharge learning needs (meds, wound care, etc.)  - Arrange for interpretive services to assist at discharge as needed  - Refer to Case Management Department for coordinating discharge planning if the patient needs post-hospital services based on physician/advanced practitioner order or complex needs related to functional status, cognitive ability, or social support system  Outcome: Progressing     Problem: Knowledge Deficit  Goal: Patient/family/caregiver demonstrates understanding of disease process, treatment plan, medications, and discharge instructions  Description: Complete learning assessment and assess knowledge base.  Interventions:  - Provide teaching at level of understanding  - Provide teaching via preferred learning methods  Outcome: Progressing     Problem: CARDIOVASCULAR - ADULT  Goal: Maintains optimal cardiac output and hemodynamic stability  Description: INTERVENTIONS:  - Monitor I/O, vital signs and rhythm  - Monitor for S/S and trends of decreased cardiac output  - Administer and titrate ordered vasoactive medications to optimize hemodynamic stability  - Assess quality of pulses, skin color and temperature  - Assess for signs of decreased coronary artery perfusion  - Instruct patient to report change in severity of symptoms  Outcome: Progressing  Goal: Absence of cardiac dysrhythmias or at baseline rhythm  Description: INTERVENTIONS:  - Continuous cardiac monitoring, vital signs, obtain 12 lead EKG if ordered  - Administer antiarrhythmic and heart rate control medications as ordered  - Monitor electrolytes and administer replacement therapy as ordered  Outcome: Progressing     Problem: RESPIRATORY - ADULT  Goal: Achieves optimal ventilation and oxygenation  Description: INTERVENTIONS:  - Assess for changes in respiratory status  - Assess for changes in mentation and behavior  - Position to facilitate oxygenation and minimize respiratory effort  - Oxygen administered by appropriate delivery if ordered  - Initiate smoking cessation education as indicated  - Encourage broncho-pulmonary hygiene including cough, deep breathe, Incentive Spirometry  - Assess the need for suctioning and aspirate as needed  - Assess and instruct to report SOB or any respiratory difficulty  - Respiratory Therapy support as indicated  Outcome: Progressing     Problem: METABOLIC, FLUID AND ELECTROLYTES - ADULT  Goal: Glucose maintained within target range  Description: INTERVENTIONS:  - Monitor Blood Glucose as ordered  - Assess for signs and symptoms of hyperglycemia and hypoglycemia  - Administer ordered medications to maintain glucose within target range  - Assess nutritional intake and initiate nutrition service referral as needed  Outcome: Progressing     Problem: SKIN/TISSUE INTEGRITY - ADULT  Goal: Incision(s), wounds(s) or drain site(s) healing without S/S of infection  Description: INTERVENTIONS  - Assess and document dressing, incision, wound bed, drain sites and surrounding tissue  - Provide patient and family education  - Perform skin care/dressing changes every shift  Outcome: Progressing

## 2023-09-25 PROBLEM — L03.119 CELLULITIS OF LOWER EXTREMITY: Status: ACTIVE | Noted: 2023-09-25

## 2023-09-25 PROBLEM — D64.9 CHRONIC ANEMIA: Status: ACTIVE | Noted: 2023-09-25

## 2023-09-25 LAB
ANION GAP SERPL CALCULATED.3IONS-SCNC: 3 MMOL/L
BASOPHILS # BLD AUTO: 0.08 THOUSANDS/ÂΜL (ref 0–0.1)
BASOPHILS NFR BLD AUTO: 1 % (ref 0–1)
BUN SERPL-MCNC: 11 MG/DL (ref 5–25)
CALCIUM SERPL-MCNC: 8.4 MG/DL (ref 8.4–10.2)
CHLORIDE SERPL-SCNC: 107 MMOL/L (ref 96–108)
CO2 SERPL-SCNC: 28 MMOL/L (ref 21–32)
CREAT SERPL-MCNC: 0.79 MG/DL (ref 0.6–1.3)
EOSINOPHIL # BLD AUTO: 0.53 THOUSAND/ÂΜL (ref 0–0.61)
EOSINOPHIL NFR BLD AUTO: 4 % (ref 0–6)
ERYTHROCYTE [DISTWIDTH] IN BLOOD BY AUTOMATED COUNT: 16.7 % (ref 11.6–15.1)
GFR SERPL CREATININE-BSD FRML MDRD: 79 ML/MIN/1.73SQ M
GLUCOSE SERPL-MCNC: 128 MG/DL (ref 65–140)
GLUCOSE SERPL-MCNC: 160 MG/DL (ref 65–140)
GLUCOSE SERPL-MCNC: 194 MG/DL (ref 65–140)
GLUCOSE SERPL-MCNC: 197 MG/DL (ref 65–140)
GLUCOSE SERPL-MCNC: 219 MG/DL (ref 65–140)
HCT VFR BLD AUTO: 28.8 % (ref 34.8–46.1)
HGB BLD-MCNC: 8.6 G/DL (ref 11.5–15.4)
IMM GRANULOCYTES # BLD AUTO: 0.08 THOUSAND/UL (ref 0–0.2)
IMM GRANULOCYTES NFR BLD AUTO: 1 % (ref 0–2)
LYMPHOCYTES # BLD AUTO: 2.31 THOUSANDS/ÂΜL (ref 0.6–4.47)
LYMPHOCYTES NFR BLD AUTO: 18 % (ref 14–44)
MAGNESIUM SERPL-MCNC: 1.7 MG/DL (ref 1.9–2.7)
MCH RBC QN AUTO: 23.6 PG (ref 26.8–34.3)
MCHC RBC AUTO-ENTMCNC: 29.9 G/DL (ref 31.4–37.4)
MCV RBC AUTO: 79 FL (ref 82–98)
MONOCYTES # BLD AUTO: 0.84 THOUSAND/ÂΜL (ref 0.17–1.22)
MONOCYTES NFR BLD AUTO: 7 % (ref 4–12)
MRSA NOSE QL CULT: NORMAL
NEUTROPHILS # BLD AUTO: 8.96 THOUSANDS/ÂΜL (ref 1.85–7.62)
NEUTS SEG NFR BLD AUTO: 69 % (ref 43–75)
NRBC BLD AUTO-RTO: 0 /100 WBCS
PLATELET # BLD AUTO: 337 THOUSANDS/UL (ref 149–390)
PMV BLD AUTO: 10.6 FL (ref 8.9–12.7)
POTASSIUM SERPL-SCNC: 3.7 MMOL/L (ref 3.5–5.3)
PROCALCITONIN SERPL-MCNC: 0.05 NG/ML
RBC # BLD AUTO: 3.64 MILLION/UL (ref 3.81–5.12)
SODIUM SERPL-SCNC: 138 MMOL/L (ref 135–147)
VANCOMYCIN SERPL-MCNC: 14.5 UG/ML (ref 10–20)
WBC # BLD AUTO: 12.8 THOUSAND/UL (ref 4.31–10.16)

## 2023-09-25 PROCEDURE — 99232 SBSQ HOSP IP/OBS MODERATE 35: CPT | Performed by: INTERNAL MEDICINE

## 2023-09-25 PROCEDURE — 83735 ASSAY OF MAGNESIUM: CPT

## 2023-09-25 PROCEDURE — 97167 OT EVAL HIGH COMPLEX 60 MIN: CPT

## 2023-09-25 PROCEDURE — 97163 PT EVAL HIGH COMPLEX 45 MIN: CPT

## 2023-09-25 PROCEDURE — 84145 PROCALCITONIN (PCT): CPT

## 2023-09-25 PROCEDURE — 80048 BASIC METABOLIC PNL TOTAL CA: CPT | Performed by: INTERNAL MEDICINE

## 2023-09-25 PROCEDURE — 85025 COMPLETE CBC W/AUTO DIFF WBC: CPT | Performed by: INTERNAL MEDICINE

## 2023-09-25 PROCEDURE — 80202 ASSAY OF VANCOMYCIN: CPT | Performed by: INTERNAL MEDICINE

## 2023-09-25 PROCEDURE — 82948 REAGENT STRIP/BLOOD GLUCOSE: CPT

## 2023-09-25 RX ORDER — OXYCODONE HYDROCHLORIDE 5 MG/1
5 TABLET ORAL EVERY 6 HOURS PRN
Status: DISCONTINUED | OUTPATIENT
Start: 2023-09-25 | End: 2023-09-30 | Stop reason: HOSPADM

## 2023-09-25 RX ORDER — LIDOCAINE 50 MG/G
1 PATCH TOPICAL DAILY
Status: DISCONTINUED | OUTPATIENT
Start: 2023-09-25 | End: 2023-09-30 | Stop reason: HOSPADM

## 2023-09-25 RX ORDER — MAGNESIUM SULFATE HEPTAHYDRATE 40 MG/ML
2 INJECTION, SOLUTION INTRAVENOUS ONCE
Status: COMPLETED | OUTPATIENT
Start: 2023-09-25 | End: 2023-09-25

## 2023-09-25 RX ADMIN — GABAPENTIN 100 MG: 100 CAPSULE ORAL at 17:20

## 2023-09-25 RX ADMIN — METOPROLOL TARTRATE 100 MG: 100 TABLET, FILM COATED ORAL at 08:27

## 2023-09-25 RX ADMIN — ACETAMINOPHEN 325MG 650 MG: 325 TABLET ORAL at 00:21

## 2023-09-25 RX ADMIN — METOPROLOL TARTRATE 100 MG: 100 TABLET, FILM COATED ORAL at 22:42

## 2023-09-25 RX ADMIN — ATORVASTATIN CALCIUM 40 MG: 40 TABLET, FILM COATED ORAL at 08:27

## 2023-09-25 RX ADMIN — HYDRALAZINE HYDROCHLORIDE 10 MG: 10 TABLET, FILM COATED ORAL at 22:43

## 2023-09-25 RX ADMIN — HYDRALAZINE HYDROCHLORIDE 10 MG: 10 TABLET, FILM COATED ORAL at 17:20

## 2023-09-25 RX ADMIN — TOPIRAMATE 100 MG: 100 TABLET, FILM COATED ORAL at 22:43

## 2023-09-25 RX ADMIN — ERYTHROMYCIN 0.5 INCH: 5 OINTMENT OPHTHALMIC at 00:19

## 2023-09-25 RX ADMIN — INSULIN GLARGINE 18 UNITS: 100 INJECTION, SOLUTION SUBCUTANEOUS at 08:27

## 2023-09-25 RX ADMIN — HYDRALAZINE HYDROCHLORIDE 10 MG: 10 TABLET, FILM COATED ORAL at 08:26

## 2023-09-25 RX ADMIN — GABAPENTIN 100 MG: 100 CAPSULE ORAL at 08:26

## 2023-09-25 RX ADMIN — INSULIN LISPRO 5 UNITS: 100 INJECTION, SOLUTION INTRAVENOUS; SUBCUTANEOUS at 08:26

## 2023-09-25 RX ADMIN — PIPERACILLIN SODIUM AND TAZOBACTAM SODIUM 3.38 G: 36; 4.5 INJECTION, POWDER, LYOPHILIZED, FOR SOLUTION INTRAVENOUS at 15:28

## 2023-09-25 RX ADMIN — ERYTHROMYCIN 0.5 INCH: 5 OINTMENT OPHTHALMIC at 22:53

## 2023-09-25 RX ADMIN — MAGNESIUM SULFATE HEPTAHYDRATE 2 G: 40 INJECTION, SOLUTION INTRAVENOUS at 08:26

## 2023-09-25 RX ADMIN — NYSTATIN: 100000 POWDER TOPICAL at 17:21

## 2023-09-25 RX ADMIN — LIDOCAINE 1 PATCH: 700 PATCH TOPICAL at 08:26

## 2023-09-25 RX ADMIN — ERYTHROMYCIN 0.5 INCH: 5 OINTMENT OPHTHALMIC at 06:59

## 2023-09-25 RX ADMIN — SODIUM CHLORIDE, SODIUM GLUCONATE, SODIUM ACETATE, POTASSIUM CHLORIDE, MAGNESIUM CHLORIDE, SODIUM PHOSPHATE, DIBASIC, AND POTASSIUM PHOSPHATE 100 ML/HR: .53; .5; .37; .037; .03; .012; .00082 INJECTION, SOLUTION INTRAVENOUS at 17:34

## 2023-09-25 RX ADMIN — INSULIN LISPRO 5 UNITS: 100 INJECTION, SOLUTION INTRAVENOUS; SUBCUTANEOUS at 12:58

## 2023-09-25 RX ADMIN — NYSTATIN: 100000 POWDER TOPICAL at 08:36

## 2023-09-25 RX ADMIN — INSULIN LISPRO 2 UNITS: 100 INJECTION, SOLUTION INTRAVENOUS; SUBCUTANEOUS at 17:20

## 2023-09-25 RX ADMIN — VANCOMYCIN HYDROCHLORIDE 1000 MG: 1 INJECTION, SOLUTION INTRAVENOUS at 00:19

## 2023-09-25 RX ADMIN — ERYTHROMYCIN 0.5 INCH: 5 OINTMENT OPHTHALMIC at 12:58

## 2023-09-25 RX ADMIN — PREDNISONE 4 MG: 1 TABLET ORAL at 08:26

## 2023-09-25 RX ADMIN — LOSARTAN POTASSIUM 100 MG: 50 TABLET, FILM COATED ORAL at 08:27

## 2023-09-25 RX ADMIN — APIXABAN 5 MG: 5 TABLET, FILM COATED ORAL at 08:27

## 2023-09-25 RX ADMIN — APIXABAN 5 MG: 5 TABLET, FILM COATED ORAL at 17:20

## 2023-09-25 RX ADMIN — FLUTICASONE FUROATE AND VILANTEROL TRIFENATATE 1 PUFF: 200; 25 POWDER RESPIRATORY (INHALATION) at 08:25

## 2023-09-25 RX ADMIN — PIPERACILLIN SODIUM AND TAZOBACTAM SODIUM 3.38 G: 36; 4.5 INJECTION, POWDER, LYOPHILIZED, FOR SOLUTION INTRAVENOUS at 22:53

## 2023-09-25 RX ADMIN — INSULIN LISPRO 2 UNITS: 100 INJECTION, SOLUTION INTRAVENOUS; SUBCUTANEOUS at 12:58

## 2023-09-25 RX ADMIN — VANCOMYCIN HYDROCHLORIDE 1250 MG: 5 INJECTION, POWDER, LYOPHILIZED, FOR SOLUTION INTRAVENOUS at 12:58

## 2023-09-25 RX ADMIN — PIPERACILLIN SODIUM AND TAZOBACTAM SODIUM 3.38 G: 36; 4.5 INJECTION, POWDER, LYOPHILIZED, FOR SOLUTION INTRAVENOUS at 08:37

## 2023-09-25 RX ADMIN — INSULIN LISPRO 2 UNITS: 100 INJECTION, SOLUTION INTRAVENOUS; SUBCUTANEOUS at 08:25

## 2023-09-25 RX ADMIN — AMLODIPINE BESYLATE 5 MG: 5 TABLET ORAL at 08:27

## 2023-09-25 RX ADMIN — ERYTHROMYCIN 0.5 INCH: 5 OINTMENT OPHTHALMIC at 17:20

## 2023-09-25 RX ADMIN — INSULIN GLARGINE 18 UNITS: 100 INJECTION, SOLUTION SUBCUTANEOUS at 22:44

## 2023-09-25 RX ADMIN — PIPERACILLIN SODIUM AND TAZOBACTAM SODIUM 3.38 G: 36; 4.5 INJECTION, POWDER, LYOPHILIZED, FOR SOLUTION INTRAVENOUS at 03:00

## 2023-09-25 RX ADMIN — INSULIN LISPRO 5 UNITS: 100 INJECTION, SOLUTION INTRAVENOUS; SUBCUTANEOUS at 17:20

## 2023-09-25 NOTE — ASSESSMENT & PLAN NOTE
· Patient has a history of chronic diabetic foot ulcers  · Patient noted that the dressings on both of her legs had not been changed since he left short-term rehab greater than 2 weeks prior to this admission  · Patient was evaluated podiatry: Noted to have bilateral diabetic foot ulcers, clinically infected with surrounding erythema and purulent drainage  · CT scan without evidence of osteomyelitis  · Continue antibiotics with Zosyn  · Continue local wound care per podiatry  · Per podiatry patient may be weightbearing as tolerated

## 2023-09-25 NOTE — UTILIZATION REVIEW
Initial Clinical Review    Admission: Date/Time/Statement:   Admission Orders (From admission, onward)     Ordered        09/24/23 0549  INPATIENT ADMISSION  Once                      Orders Placed This Encounter   Procedures   • INPATIENT ADMISSION     Standing Status:   Standing     Number of Occurrences:   1     Order Specific Question:   Level of Care     Answer:   Med Surg [16]     Order Specific Question:   Estimated length of stay     Answer:   More than 2 Midnights     Order Specific Question:   Certification     Answer:   I certify that inpatient services are medically necessary for this patient for a duration of greater than two midnights. See H&P and MD Progress Notes for additional information about the patient's course of treatment. ED Arrival Information     Expected   -    Arrival   9/23/2023 22:45    Acuity   Urgent            Means of arrival   Ambulance    Escorted by   [facility] ([address] [place] [address])    Service   Hospitalist    Admission type   Emergency            Arrival complaint   Eye Pain            Chief Complaint   Patient presents with   • Eye Pain     Pt coming via EMS, currently homeless, c/o eye pain for approx. 2 weeks. Swelling, redness, and drainage noted to bilateral eyes. Pt reports had COVID, symptoms resided and eye pain began. Pt noticeably shaking in triage and completely soiled. • Medical Problem     Pt reports being soiled "for days" because "I'm so scared to get up to use the bathroom" - when asked to elaborate pt is tearful and states she can't get up to walk to the bathroom. Large rash is also noted under R breast, pt unsure of how long it has been there but states "I have a lump". Pt reports itchiness to the area. Pt also has bilateral foot wounds, states they have been there "a really long time". Initial Presentation - 9/23: 57 y.o. female who [place] [facility] Inpatient admission for evaluation and treatment of severe sepsis. PMHx: anemia, T2DM, HLD, HTN, seizures, CKD S3a. Presented w/ red eyes w/ associated discharge for a few weeks, nasal congestion, lump w/ rash under R breast, b/l foot ulcerations, notes inability to get up to use the bathroom s/t her legs feeling "stiff and weak". Notes bandages on b/l LE had not been changed since d/c from [facility] 2 weeks ago. On exam, yellow/green discharge for b/l eyes, chemosis and blepharitis, tachycardic, b/l LE stasis dermatitis w/ swelling, b/l foot ulcerations. Plan: IV ABX, erythromycin ointment to b/l eyes, nystatin powder, IVF, Trend labs, replete electrolytes as needed; supportive care. Podiatry consulted. Date: 9/24   Day 2: She reports that she has not been able to make transfers from her wheelchair onto the toilet and so has been soiling herself. On exam, b/l erythema and drainage from eyes, tachycardic, b/l foot ulcerations w/ superimposed cellulitis. WBC 16.10. Lactic 3.7. EKG w/ sinus tachycardic. Plan: follow blood cultures, IVF, IV ABX, ointment to b/l eyes q6h, PT/OT evals, continue current meds, SSI w/ BG checks ACHS, Trend labs, replete electrolytes as needed. Date: 09/25/23    Day 3: Has surpassed a 2nd midnight with active treatments and services, which include pending podiatry evaluation, IV ABX, IVF, SSI w/ BG checks ACHS, continue current meds, Trend labs, replete electrolytes as needed; carb control sodium restriction, fluid restriction.     ED Triage Vitals   Temperature Pulse Respirations Blood Pressure SpO2   09/23/23 2253 09/23/23 2253 09/23/23 2253 09/23/23 2253 09/23/23 2253   98.1 °F (36.7 °C) (!) 131 16 146/80 95 %      Temp Source Heart Rate Source Patient Position - Orthostatic VS BP Location FiO2 (%)   09/23/23 2253 09/23/23 2253 09/23/23 2253 09/23/23 2253 --   Oral Monitor Lying Right arm       Pain Score       09/24/23 0831       No Pain          Wt Readings from Last 1 Encounters:   09/25/23 127 kg (280 lb 13.9 oz)     Additional Vital Signs:   Date/Time Temp Pulse Resp BP MAP (mmHg) SpO2 O2 Device   09/25/23 09:48:10 -- 70 -- 170/87 115 97 % --   09/25/23 07:25:34 97.6 °F (36.4 °C) 71 -- 158/77 104 97 % None (Room air)   09/25/23 03:22:46 98.4 °F (36.9 °C) 63 16 156/66 96 97 % None (Room air)   09/24/23 23:52:40 98.9 °F (37.2 °C) 68 20 138/77 -- 96 % None (Room air)   09/24/23 2113 -- 82 -- 149/74 -- -- --   09/24/23 1541 99.6 °F (37.6 °C) 81 16 135/57 87 92 % None (Room air)   09/24/23 1045 99.8 °F (37.7 °C) 78 20 126/56 79 98 % None (Room air)   09/24/23 0831 98 °F (36.7 °C) 108 Abnormal  18 133/63 -- 98 % None (Room air)   09/24/23 0822 -- 108 Abnormal  -- 125/61 -- -- --   09/24/23 0530 -- 116 Abnormal  19 137/63 91 96 % None (Room air)   09/24/23 0400 -- 118 Abnormal  20 120/57 82 96 % None (Room air)   09/24/23 0230 -- 124 Abnormal  21 145/67 -- 97 % None (Room air)   09/24/23 0130 -- 124 Abnormal  21 157/67 97 99 % None (Room air)   09/24/23 0015 -- 118 Abnormal  16 148/67 -- 98 % None (Room air)     Pertinent Labs/Diagnostic Test Results:   CT lower extremity w contrast right   Final Result by Alonso Dubose MD (09/24 0820)   Right lower extremity subcutaneous edema consistent with nonspecific cellulitis without evidence of drainable abscess collection. No soft tissue emphysema or osseous destructive change to indicate osteomyelitis. Concordant virtual radiologic report was provided at 0435 hours on 9/24/2023         Workstation performed: [id number]         CT lower extremity w contrast left   Final Result by Alonso Dubose MD (09/24 0818)   Left lower extremity diffuse subcutaneous edema indicating nonspecific cellulitis without drainable abscess collection, soft tissue emphysema or osseous destructive change to indicate osteomyelitis.          Concordant virtual radiologic report was provided at 0439 hours on 9/24/2023         Workstation performed: [id number]         XR chest 1 view portable   ED Interpretation by Delmy Díaz MEVLA Hebert (09/24 0236)   ED wet read:  No acute cardiopulmonary disease appreciated. Final Result by Steffany Marcelo MD (09/24 1509)      No acute cardiopulmonary disease.                Workstation performed: [id number]           Results from last 7 days   Lab Units 09/24/23  0031   SARS-COV-2  Negative     Results from last 7 days   Lab Units 09/25/23  0940 09/24/23  0830 09/24/23  0026   WBC Thousand/uL 12.80* 14.84* 16.10*   HEMOGLOBIN g/dL 8.6* 8.3* 10.3*   HEMATOCRIT % 28.8* 27.9* 34.9   PLATELETS Thousands/uL 337 299 400*   NEUTROS ABS Thousands/µL 8.96* 11.35* 13.08*         Results from last 7 days   Lab Units 09/25/23  0940 09/25/23  0704 09/24/23  0830 09/24/23  0026   SODIUM mmol/L 138  --  134* 137   POTASSIUM mmol/L 3.7  --  4.1 3.8   CHLORIDE mmol/L 107  --  105 101   CO2 mmol/L 28  --  25 26   ANION GAP mmol/L 3  --  4 10   BUN mg/dL 11  --  16 20   CREATININE mg/dL 0.79  --  0.85 1.20   EGFR ml/min/1.73sq m 79  --  72 47   CALCIUM mg/dL 8.4  --  7.9* 9.2   MAGNESIUM mg/dL  --  1.7*  --   --      Results from last 7 days   Lab Units 09/24/23  0830 09/24/23  0026   AST U/L 8* 7*   ALT U/L 6* 7   ALK PHOS U/L 106* 140*   TOTAL PROTEIN g/dL 5.9* 7.6   ALBUMIN g/dL 2.5* 3.2*   TOTAL BILIRUBIN mg/dL 0.46 0.40     Results from last 7 days   Lab Units 09/25/23  0728 09/24/23  2048 09/24/23  1529 09/24/23  1121 09/24/23  0911 09/24/23  0132   POC GLUCOSE mg/dl 160* 165* 193* 218* 285* 324*     Results from last 7 days   Lab Units 09/25/23  0940 09/24/23  0830 09/24/23  0026   GLUCOSE RANDOM mg/dL 219* 280* 378*         Results from last 7 days   Lab Units 09/24/23  0026   PROTIME seconds 13.6   INR  1.04   PTT seconds 37         Results from last 7 days   Lab Units 09/25/23  0704 09/24/23  0830 09/24/23  0026   PROCALCITONIN ng/ml 0.05 0.07 0.16     Results from last 7 days   Lab Units 09/24/23  0348 09/24/23  0026   LACTIC ACID mmol/L 1.5 3.7*     Results from last 7 days   Lab Units 09/24/23  0026   CRP mg/L 129.8*   SED RATE mm/hour 73*         Results from last 7 days   Lab Units 09/24/23  0031   INFLUENZA A PCR  Negative   INFLUENZA B PCR  Negative   RSV PCR  Negative     Results from last 7 days   Lab Units 09/24/23  0026   BLOOD CULTURE  No Growth at 24 hrs. No Growth at 24 hrs.      Results from last 7 days   Lab Units 09/25/23  0704   VANCOMYCIN RM ug/mL 14.5       ED Treatment:   Medication Administration from 09/23/2023 2245 to 09/24/2023 1035       Date/Time Order Dose Route Action     09/24/2023 0037 EDT erythromycin (ILOTYCIN) 0.5 % ophthalmic ointment 1 inch Both Eyes Given     09/24/2023 0039 EDT nystatin (MYCOSTATIN) powder 1 Application Topical Given     09/24/2023 0037 EDT sodium chloride 0.9 % bolus 1,000 mL 1,000 mL Intravenous New Bag     09/24/2023 0214 EDT vancomycin (VANCOCIN) 1,750 mg in sodium chloride 0.9 % 500 mL IVPB 1,750 mg Intravenous New Bag     09/24/2023 0216 EDT piperacillin-tazobactam (ZOSYN) 4.5 g in sodium chloride 0.9 % 100 mL IVPB 4.5 g Intravenous New Bag     09/24/2023 0359 EDT iohexol (OMNIPAQUE) 350 MG/ML injection (MULTI-DOSE) 100 mL 100 mL Intravenous Given     09/24/2023 0545 EDT sodium chloride 0.9 % bolus 1,000 mL 1,000 mL Intravenous New Bag     09/24/2023 0823 EDT amLODIPine (NORVASC) tablet 5 mg 5 mg Oral Given     09/24/2023 3826 EDT apixaban (ELIQUIS) tablet 5 mg 5 mg Oral Given     09/24/2023 3732 EDT atorvastatin (LIPITOR) tablet 40 mg 40 mg Oral Given     09/24/2023 0823 EDT gabapentin (NEURONTIN) capsule 100 mg 100 mg Oral Given     09/24/2023 9963 EDT hydrALAZINE (APRESOLINE) tablet 10 mg 10 mg Oral Given     09/24/2023 0917 EDT insulin glargine (LANTUS) subcutaneous injection 18 Units 0.18 mL 18 Units Subcutaneous Given     09/24/2023 0917 EDT insulin lispro (HumaLOG) 100 units/mL subcutaneous injection 5 Units 5 Units Subcutaneous Given     09/24/2023 0823 EDT losartan (COZAAR) tablet 100 mg 100 mg Oral Given     09/24/2023 7204 EDT metoprolol tartrate (LOPRESSOR) tablet 100 mg 100 mg Oral Given     09/24/2023 0823 EDT predniSONE tablet 4 mg 4 mg Oral Given     09/24/2023 0918 EDT nystatin (MYCOSTATIN) powder -- Topical Given     09/24/2023 0830 EDT multi-electrolyte (PLASMALYTE-A/ISOLYTE-S PH 7.4) IV solution 100 mL/hr Intravenous New Bag     09/24/2023 0827 EDT piperacillin-tazobactam (ZOSYN) IVPB 3.375 g 3.375 g Intravenous New Bag     09/24/2023 0716 EDT multi-electrolyte (ISOLYTE-S PH 7.4) bolus 1,000 mL 1,000 mL Intravenous New Bag     09/24/2023 0800 EDT erythromycin (ILOTYCIN) 0.5 % ophthalmic ointment 0.5 inch 0.5 inch Both Eyes Given     09/24/2023 0918 EDT insulin lispro (HumaLOG) 100 units/mL subcutaneous injection 2-12 Units 6 Units Subcutaneous Given        Past Medical History:   Diagnosis Date   • Anemia    • Benign hypertension     Procedure/Onset: 01/01/2005; Description: BP elevated today. Pt reports running out of BP meds 2wks ago. Will resume BP meds. • Diabetes mellitus ([address])    • Diabetes mellitus type 2 with complications, uncontrolled 9/8/2015   • Dyspnea on exertion    • Hyperlipidemia    • Hypertension    • Legally blind 2010   • Miscarriage     x 3   • Polymyalgia rheumatica ([address]) 11/24/2021   • Seizures (HCC)    • Sickle cell trait (HCC)    • Stage 3a chronic kidney disease ([address]) 5/19/2022   • Thyroid nodule 3/6/2020     Present on Admission:  • Uncontrolled type 2 diabetes mellitus with hyperglycemia (HCC)  • Ambulatory dysfunction  • Stage 3a chronic kidney disease (HCC)  • Pulmonary embolism (HCC)      Admitting Diagnosis: Cough [R05.9]  Intertrigo [L30.4]  Bacterial conjunctivitis [H10.9]  Cellulitis [L03.90]  Eye pain [H57.10]  Diabetic foot ulcer ([address]) [P47.775, L97.509]  Homelessness [Z59.00]  Severe sepsis ([address]) [A41.9, R65.20]  Cellulitis of lower extremity, unspecified laterality [L03.119]  Age/Sex: 57 y.o. female  Admission Orders:  Consistent Carbohydrate Diet w/ 2 gm sodium restriction.   Blood glucose checks ACHS. 1800 mL fluid restriction. Daily wound care. DW. I&O. SCDs. Scheduled Medications:  amLODIPine, 5 mg, Oral, Daily  apixaban, 5 mg, Oral, BID  atorvastatin, 40 mg, Oral, Daily  erythromycin, 0.5 inch, Both Eyes, Q6H [facility]  fluticasone-vilanterol, 1 puff, Inhalation, Daily  gabapentin, 100 mg, Oral, BID  hydrALAZINE, 10 mg, Oral, TID  insulin glargine, 18 Units, Subcutaneous, Q12H RONI  insulin lispro, 2-12 Units, Subcutaneous, TID AC  insulin lispro, 2-12 Units, Subcutaneous, HS  insulin lispro, 5 Units, Subcutaneous, TID With Meals  lidocaine, 1 patch, Topical, Daily  losartan, 100 mg, Oral, Daily  metoprolol tartrate, 100 mg, Oral, Q12H RONI  nystatin, , Topical, BID  piperacillin-tazobactam, 3.375 g, Intravenous, Q6H  predniSONE, 4 mg, Oral, Daily  topiramate, 100 mg, Oral, HS  vancomycin, 1,250 mg, Intravenous, Q12H    Continuous IV Infusions:  multi-electrolyte, 100 mL/hr, Intravenous, Continuous    PRN Meds:  acetaminophen, 650 mg, Oral, Q6H PRN; 9/25 x1  aluminum-magnesium hydroxide-simethicone, 30 mL, Oral, Q6H PRN  magnesium sulfate, 2 g, Intravenous; 9/25 x1  oxyCODONE, 5 mg, Oral, Q6H PRN  oxyCODONE, 2.5 mg, Oral, Q6H PRN        IP CONSULT TO PODIATRY  IP CONSULT TO CASE MANAGEMENT  IP CONSULT TO PHARMACY    Network Utilization Review Department  ATTENTION: Please call with any questions or concerns to [phone] and carefully listen to the prompts so that you are directed to the right person. All voicemails are confidential.  Tom Mckeons all requests for admission clinical reviews, approved or denied determinations and any other requests to dedicated fax number below belonging to the campus where the patient is receiving treatment.  List of dedicated fax numbers for the Facilities:  [facility] JCARLOS (Administrative/Medical Necessity) [phone] [facility] [facility] ([facility] [phone]   [address] [place] [address] Drive [phone] [facility] 1000 [facility] [phone] [address] [address] [facility] [address] [place] [address] [phone]

## 2023-09-25 NOTE — ASSESSMENT & PLAN NOTE
· Pt reports that she began with symptoms of eye discharge and erythema about 2-3 weeks ago, which has been progressively getting worse since that time. She reports that she tried using some eye drops she had around with no relief.   · Continue erythromycin ointment q6 hours  · Supportive care  · Improving

## 2023-09-25 NOTE — PLAN OF CARE
Problem: MOBILITY - ADULT  Goal: Maintain or return to baseline ADL function  Description: INTERVENTIONS:  -  Assess patient's ability to carry out ADLs; assess patient's baseline for ADL function and identify physical deficits which impact ability to perform ADLs (bathing, care of mouth/teeth, toileting, grooming, dressing, etc.)  - Assess/evaluate cause of self-care deficits   - Assess range of motion  - Assess patient's mobility; develop plan if impaired  - Assess patient's need for assistive devices and provide as appropriate  - Encourage maximum independence but intervene and supervise when necessary  - Involve family in performance of ADLs  - Assess for home care needs following discharge   - Consider OT consult to assist with ADL evaluation and planning for discharge  - Provide patient education as appropriate  Outcome: Progressing  Goal: Maintains/Returns to pre admission functional level  Description: INTERVENTIONS:  - Perform BMAT or MOVE assessment daily.   - Set and communicate daily mobility goal to care team and patient/family/caregiver. - Collaborate with rehabilitation services on mobility goals if consulted  - Perform Range of Motion 3 times a day. - Reposition patient every 2 hours.   - Dangle patient 3 times a day  - Stand patient 3 times a day  - Ambulate patient 3 times a day  - Out of bed to chair 3 times a day   - Out of bed for meals 3 times a day  - Out of bed for toileting  - Record patient progress and toleration of activity level   Outcome: Progressing     Problem: PAIN - ADULT  Goal: Verbalizes/displays adequate comfort level or baseline comfort level  Description: Interventions:  - Encourage patient to monitor pain and request assistance  - Assess pain using appropriate pain scale  - Administer analgesics based on type and severity of pain and evaluate response  - Implement non-pharmacological measures as appropriate and evaluate response  - Consider cultural and social influences on pain and pain management  - Notify physician/advanced practitioner if interventions unsuccessful or patient reports new pain  Outcome: Progressing     Problem: INFECTION - ADULT  Goal: Absence or prevention of progression during hospitalization  Description: INTERVENTIONS:  - Assess and monitor for signs and symptoms of infection  - Monitor lab/diagnostic results  - Monitor all insertion sites, i.e. indwelling lines, tubes, and drains  - Monitor endotracheal if appropriate and nasal secretions for changes in amount and color  - Cobb appropriate cooling/warming therapies per order  - Administer medications as ordered  - Instruct and encourage patient and family to use good hand hygiene technique  - Identify and instruct in appropriate isolation precautions for identified infection/condition  Outcome: Progressing     Problem: SAFETY ADULT  Goal: Maintain or return to baseline ADL function  Description: INTERVENTIONS:  -  Assess patient's ability to carry out ADLs; assess patient's baseline for ADL function and identify physical deficits which impact ability to perform ADLs (bathing, care of mouth/teeth, toileting, grooming, dressing, etc.)  - Assess/evaluate cause of self-care deficits   - Assess range of motion  - Assess patient's mobility; develop plan if impaired  - Assess patient's need for assistive devices and provide as appropriate  - Encourage maximum independence but intervene and supervise when necessary  - Involve family in performance of ADLs  - Assess for home care needs following discharge   - Consider OT consult to assist with ADL evaluation and planning for discharge  - Provide patient education as appropriate  Outcome: Progressing  Goal: Maintains/Returns to pre admission functional level  Description: INTERVENTIONS:  - Perform BMAT or MOVE assessment daily.   - Set and communicate daily mobility goal to care team and patient/family/caregiver.    - Collaborate with rehabilitation services on mobility goals if consulted  - Perform Range of Motion 3 times a day. - Reposition patient every 2 hours.   - Dangle patient 3 times a day  - Stand patient 3 times a day  - Ambulate patient 3 times a day  - Out of bed to chair 3 times a day   - Out of bed for meals 3 times a day  - Out of bed for toileting  - Record patient progress and toleration of activity level   Outcome: Progressing  Goal: Patient will remain free of falls  Description: INTERVENTIONS:  - Educate patient/family on patient safety including physical limitations  - Instruct patient to call for assistance with activity   - Consult OT/PT to assist with strengthening/mobility   - Keep Call bell within reach  - Keep bed low and locked with side rails adjusted as appropriate  - Keep care items and personal belongings within reach  - Initiate and maintain comfort rounds  - Make Fall Risk Sign visible to staff  - Offer Toileting every 2 Hours, in advance of need  - Initiate/Maintain bed alarm  - Obtain necessary fall risk management equipment:   - Apply yellow socks and bracelet for high fall risk patients  - Consider moving patient to room near nurses station  Outcome: Progressing     Problem: DISCHARGE PLANNING  Goal: Discharge to home or other facility with appropriate resources  Description: INTERVENTIONS:  - Identify barriers to discharge w/patient and caregiver  - Arrange for needed discharge resources and transportation as appropriate  - Identify discharge learning needs (meds, wound care, etc.)  - Arrange for interpretive services to assist at discharge as needed  - Refer to Case Management Department for coordinating discharge planning if the patient needs post-hospital services based on physician/advanced practitioner order or complex needs related to functional status, cognitive ability, or social support system  Outcome: Progressing     Problem: Knowledge Deficit  Goal: Patient/family/caregiver demonstrates understanding of disease process, treatment plan, medications, and discharge instructions  Description: Complete learning assessment and assess knowledge base.   Interventions:  - Provide teaching at level of understanding  - Provide teaching via preferred learning methods  Outcome: Progressing     Problem: CARDIOVASCULAR - ADULT  Goal: Maintains optimal cardiac output and hemodynamic stability  Description: INTERVENTIONS:  - Monitor I/O, vital signs and rhythm  - Monitor for S/S and trends of decreased cardiac output  - Administer and titrate ordered vasoactive medications to optimize hemodynamic stability  - Assess quality of pulses, skin color and temperature  - Assess for signs of decreased coronary artery perfusion  - Instruct patient to report change in severity of symptoms  Outcome: Progressing  Goal: Absence of cardiac dysrhythmias or at baseline rhythm  Description: INTERVENTIONS:  - Continuous cardiac monitoring, vital signs, obtain 12 lead EKG if ordered  - Administer antiarrhythmic and heart rate control medications as ordered  - Monitor electrolytes and administer replacement therapy as ordered  Outcome: Progressing     Problem: RESPIRATORY - ADULT  Goal: Achieves optimal ventilation and oxygenation  Description: INTERVENTIONS:  - Assess for changes in respiratory status  - Assess for changes in mentation and behavior  - Position to facilitate oxygenation and minimize respiratory effort  - Oxygen administered by appropriate delivery if ordered  - Initiate smoking cessation education as indicated  - Encourage broncho-pulmonary hygiene including cough, deep breathe, Incentive Spirometry  - Assess the need for suctioning and aspirate as needed  - Assess and instruct to report SOB or any respiratory difficulty  - Respiratory Therapy support as indicated  Outcome: Progressing     Problem: METABOLIC, FLUID AND ELECTROLYTES - ADULT  Goal: Glucose maintained within target range  Description: INTERVENTIONS:  - Monitor Blood Glucose as ordered  - Assess for signs and symptoms of hyperglycemia and hypoglycemia  - Administer ordered medications to maintain glucose within target range  - Assess nutritional intake and initiate nutrition service referral as needed  Outcome: Progressing     Problem: SKIN/TISSUE INTEGRITY - ADULT  Goal: Incision(s), wounds(s) or drain site(s) healing without S/S of infection  Description: INTERVENTIONS  - Assess and document dressing, incision, wound bed, drain sites and surrounding tissue  - Provide patient and family education  - Perform skin care/dressing changes every 2 hrs  Outcome: Progressing     Problem: Prexisting or High Potential for Compromised Skin Integrity  Goal: Skin integrity is maintained or improved  Description: INTERVENTIONS:  - Identify patients at risk for skin breakdown  - Assess and monitor skin integrity  - Assess and monitor nutrition and hydration status  - Monitor labs   - Assess for incontinence   - Turn and reposition patient  - Assist with mobility/ambulation  - Relieve pressure over bony prominences  - Avoid friction and shearing  - Provide appropriate hygiene as needed including keeping skin clean and dry  - Evaluate need for skin moisturizer/barrier cream  - Collaborate with interdisciplinary team   - Patient/family teaching  - Consider wound care consult   Outcome: Progressing

## 2023-09-25 NOTE — PLAN OF CARE
Problem: OCCUPATIONAL THERAPY ADULT  Goal: Performs self-care activities at highest level of function for planned discharge setting. See evaluation for individualized goals. Description: Treatment Interventions: ADL retraining, Functional transfer training, UE strengthening/ROM, Endurance training, Patient/family training, Equipment evaluation/education, Compensatory technique education          See flowsheet documentation for full assessment, interventions and recommendations. Note: Limitation: Decreased ADL status, Decreased UE strength, Decreased Safe judgement during ADL, Decreased endurance, Decreased high-level ADLs  Prognosis: Fair  Assessment: Pt is a 61y/o female admitted to the hospital 2* symptoms of b/l LE erythema, b/l eye discharge. Pt noted with severe sepsis. Pt with PMH homelessness, legally blind, wheelchair bound, DM2, chronic diabetic foot wounds, CKD, PE(with hospitalization, d/c to rehab), b/l LE ulcers, MO, HTN. PTA pt states independence with her ADLs, transfers--stand-pivot; limited ambulation; neg falls; assistance with w/c mobility. During initial eval, pt demonstrated deficits with her functional balance, functional mobility, ADL status, transfer safety, b/l UE strength, and activity tolerance(currently fair=15-20mins). Pt would benefit from continued OT tx for the above deficits. 3-5xwk/1-2wks. The patient's raw score on the AM-PAC Daily Activity Inpatient Short Form is 17. A raw score of less than 19 suggests the patient may benefit from discharge to post-acute rehabilitation services. Please refer to the recommendation of the Occupational Therapist for safe discharge planning.      OT Discharge Recommendation: Post acute rehabilitation services

## 2023-09-25 NOTE — PROGRESS NOTES
[facility]  Progress Note  Name: Summer Izaguirre  MRN: [id number]  Unit/Bed#: E5 -01 I Date of Admission: 9/23/2023   Date of Service: 9/25/2023 I Hospital Day: 1    Assessment/Plan   * Severe sepsis [facility])  Assessment & Plan  Pt is a 20-year-old female with past medical history significant for with PMHx of homelessness, legally blind, wheelchair bound, DM2, chronic diabetic foot wounds, CKD, and PE anticoagulation presented to the ED with complaints of bilateral lower extremity redness and swelling for the past 2 weeks    · Patient recently discharged from short term rehab about 2 weeks prior to this admission, after recent [facility] admission for COVID  · She had been living unsheltered since that time. She reports that her bandages on her feet had not been changed since she left the rehab  Patient presented to the ED fulfilling septic criteria with tachycardia and leukocytosis 16.10, and lactic acidosis  Septic source, multifactorial with bilateral lower extremity cellulitis  CT bilateral lower extremities concerning for cellulitis   CXR with no acute cardiopulmonary abnormalities  UA without evidence of infection  Blood cultures negative thus far  Lactic acid 3.7->1.5  Patient was given IV fluid resuscitation, and empiric antibiotics and admitted  She continues to improve      Cellulitis of lower extremity  Assessment & Plan  · Patient presents with bilateral lower extremity erythema  · She had a CT scan of her right lower extremity that revealed, "Right lower extremity subcutaneous edema consistent with nonspecific cellulitis without evidence of drainable abscess collection.  No soft tissue emphysema or osseous destructive change to indicate osteomyelitis"  · CT scan of left lower extremity that revealed, "Left lower extremity diffuse subcutaneous edema indicating nonspecific cellulitis without drainable abscess collection, soft tissue emphysema or osseous destructive change to indicate osteomyelitis"  · Blood cultures negative thus far  · MRSA nares swab pending  · Noted to have bilateral diabetic foot ulcers, locally infected with surrounding erythema and purulent drainage  · Continue antibiotics with Zosyn  · No history of MRSA: We will discontinue vancomycin    Calculus of gallbladder without cholecystitis without obstruction  Assessment & Plan  Noted on previous admission 4/23  No current symptoms  Outpatient serial imaging 4/24 recommended    Chronic anemia  Assessment & Plan  · Patient appears to have a chronic anemia over the past several years  · Baseline hemoglobin ranging 8-9  · Current hemoglobin at her baseline    Acute bacterial conjunctivitis of both eyes  Assessment & Plan  · Pt reports that she began with symptoms of eye discharge and erythema about 2-3 weeks ago, which has been progressively getting worse since that time. She reports that she tried using some eye drops she had around with no relief.   · Continue erythromycin ointment q6 hours  · Supportive care  · Improving    Diabetic foot ulcer ([address])  Assessment & Plan  · Patient has a history of chronic diabetic foot ulcers  · Patient noted that the dressings on both of her legs had not been changed since he left short-term rehab greater than 2 weeks prior to this admission  · Patient was evaluated podiatry: Noted to have bilateral diabetic foot ulcers, clinically infected with surrounding erythema and purulent drainage  · CT scan without evidence of osteomyelitis  · Continue antibiotics with Zosyn  · Continue local wound care per podiatry  · Per podiatry patient may be weightbearing as tolerated    Pulmonary embolism ([address])  Assessment & Plan  · Last admitted 8/08-8/15 to [facility] for acute PE and lower extremity DVT  · Discharged on Eliquis, with compliance    Homelessness  Assessment & Plan  · Admitted to [facility] at the end of August and discharge to short term rehab, afterwards patient went back to living unsheltered x since that time  · Patient reports that she has been trying to get into a senior apartment, but they are all full  · She reports difficulty finding a homeless shelter that will accommodate her wheelchair so she has been living outside  · Patient reports she tries to be compliant with medications, but it is sometimes difficult secondary to her situation  · Case management consulted    Ambulatory dysfunction  Assessment & Plan  · Patient notes that since leaving short-term rehab she has remained in her wheelchair and not ambulated   · She notes she has become progressively weaker due to this   · PT/OT lashay appreciated    Stage 3a chronic kidney disease [facility])  Assessment & Plan  Lab Results   Component Value Date    EGFR 79 09/25/2023    EGFR 72 09/24/2023    EGFR 47 09/24/2023    CREATININE 0.79 09/25/2023    CREATININE 0.85 09/24/2023    CREATININE 1.20 09/24/2023   · POA creatinine 1.2  · Mildly elevated from baseline of 0.8-0.9, but did not meet strict criteria for acute kidney injury  · Patient was given IV fluids and creatinine normalized      Hypertension  Assessment & Plan  · Continue home Norvasc 5 mg daily, hydralazine 10 mg 3 times daily, losartan 100 mg daily and metoprolol tartrate 100 mg every 12 hours  · Blood pressure adequately controlled    PMR (polymyalgia rheumatica) ([facility])  Assessment & Plan  · Continue home regimen of prednisone 4 mg daily    Uncontrolled type 2 diabetes mellitus with hyperglycemia [facility])  Assessment & Plan  Lab Results   Component Value Date    HGBA1C 10.2 (H) 08/24/2023       Recent Labs     09/24/23  2048 09/25/23  0728 09/25/23  1107 09/25/23  1628   POCGLU 165* 160* 194* 197*       Blood Sugar Average: Last 72 hrs:  (P) 217   · Continue home insulin regimen of 18 units of Lantus q12 hours with 5 units of lispro tid with meals  · Blood sugars adequately controlled  · SSI with accucheks                Family: Updated patient and her son Tiffany Lopez at the bedside    Discussed with patient's nurse and     VTE Pharmacologic Prophylaxis: RX contraindicated due to: Eliquis  VTE Mechanical Prophylaxis: sequential compression device        Certification Statement: The patient will continue to require additional inpatient hospital stay due to need for further acute intervention for cellulitis, IV antibiotics    Status: inpatient     ===================================================================    Subjective:  Patient notes has been very difficult for her since she was discharged from short-term rehab. She has been unable to find housing that can accommodate her wheelchair. She notes she was not able to use any shelters due to wheelchair access. She has been sleeping in the park with her son for the past several weeks. Patient notes she has had pain in both her her legs. She denies any pain anywhere else. Denies any shortness of breath or cough. Denies any abdominal pain, nausea, vomiting, diarrhea. Is tolerating p.o., but with poor appetite. She notes generalized weakness as well as arthralgias in her joints when she moves. She notes since she left rehab she had been not ambulating or putting weightbearing on her legs. She ambulated today to the chair and notes she felt very deconditioned. Physical Exam:   Temp:  [97.6 °F (36.4 °C)-98.9 °F (37.2 °C)] 97.9 °F (36.6 °C)  HR:  [62-82] 62  Resp:  [16-20] 16  BP: (138-170)/(63-87) 139/63    Gen:  Pleasant, non-tachypnic, non-dyspnic. Conversant. Heart: regular rate and rhythm, S1S2 present, no murmur, rub or gallop  Lungs: clear to ausculatation bilaterally. No wheezing, crackles, or rhonchi. No accessory muscle use or respiratory distress. Abd: soft, non-tender, non-distended. NABS, no guarding, rebound or peritoneal signs. Extremities: no clubbing, cyanosis. 1+ bilateral lower extremity edema. .  2+pedal pulses bilaterally. Neuro: awake, alert. Oriented. Fluent speech. Interactive  Skin: warm and dry: no petechiae, purpura and rash. LABS:   Results from last 7 days   Lab Units 09/25/23  0940 09/24/23  0830 09/24/23  0026   WBC Thousand/uL 12.80* 14.84* 16.10*   HEMOGLOBIN g/dL 8.6* 8.3* 10.3*   HEMATOCRIT % 28.8* 27.9* 34.9   PLATELETS Thousands/uL 337 299 400*     Results from last 7 days   Lab Units 09/25/23  0940 09/24/23  0830 09/24/23  0026   POTASSIUM mmol/L 3.7 4.1 3.8   CHLORIDE mmol/L 107 105 101   CO2 mmol/L 28 25 26   BUN mg/dL 11 16 20   CREATININE mg/dL 0.79 0.85 1.20   CALCIUM mg/dL 8.4 7.9* 9.2       Hospital Data:  9/24: CT right lower extremity with contrast:  subcutaneous edema consistent with nonspecific cellulitis without evidence of drainable abscess collection. No soft tissue emphysema or osseous destructive change to indicate osteomyelitis  9/24: CT left lower extremity with contrast:  subcutaneous edema consistent with nonspecific cellulitis without evidence of drainable abscess collection. No soft tissue emphysema or osseous destructive change to indicate osteomyelitis    Microbiology:  9/24: MRSA nares culture: Pending  9/24: Negative COVID, influenza, RSV  9/24: Blood culture: Negative x2        ---------------------------------------------------------------------------------------------------------------  This note has been constructed using a voice recognition system.

## 2023-09-25 NOTE — PHYSICAL THERAPY NOTE
PT EVALUATION    Pt. Name: Jany Christine  Pt. Age: 57 y.o. MRN: [id number]  LENGTH OF STAY: 1      Admitting Diagnoses:   Cough [R05.9]  Intertrigo [L30.4]  Bacterial conjunctivitis [H10.9]  Cellulitis [C88.49]  Eye pain [H57.10]  Diabetic foot ulcer ([address]) [G05.513, L97.509]  Homelessness [Z59.00]  Severe sepsis ([address]) [A41.9, R65.20]  Cellulitis of lower extremity, unspecified laterality [D64.256]    Past Medical History:   Diagnosis Date    Anemia     Benign hypertension     Procedure/Onset: 01/01/2005; Description: BP elevated today. Pt reports running out of BP meds 2wks ago. Will resume BP meds. Diabetes mellitus ([address])     Diabetes mellitus type 2 with complications, uncontrolled 9/8/2015    Dyspnea on exertion     Hyperlipidemia     Hypertension     Legally blind 2010    Miscarriage     x 3    Polymyalgia rheumatica ([address]) 11/24/2021    Seizures (HCC)     Sickle cell trait ([address])     Stage 3a chronic kidney disease ([address]) 5/19/2022    Thyroid nodule 3/6/2020       Past Surgical History:   Procedure Laterality Date    CATARACT EXTRACTION Bilateral     august and september    EYE SURGERY      HYSTERECTOMY      37    OOPHORECTOMY Left     37    NJ LIGATION/BIOPSY TEMPORAL ARTERY Bilateral 10/17/2019    Procedure: BIOPSY ARTERY TEMPORAL;  Surgeon: Marylou Elise DO;  [facility]: [facility];  Service: Vascular    US GUIDED THYROID BIOPSY  5/13/2020       Imaging Studies:  CT lower extremity w contrast right   Final Result by Parisa Denney MD (09/24 0820)   Right lower extremity subcutaneous edema consistent with nonspecific cellulitis without evidence of drainable abscess collection. No soft tissue emphysema or osseous destructive change to indicate osteomyelitis.       Concordant virtual radiologic report was provided at 0435 hours on 9/24/2023         Workstation performed: [id number]         CT lower extremity w contrast left   Final Result by Parisa Denney MD (09/24 0818)   Left lower extremity diffuse subcutaneous edema indicating nonspecific cellulitis without drainable abscess collection, soft tissue emphysema or osseous destructive change to indicate osteomyelitis. Concordant virtual radiologic report was provided at 0439 hours on 9/24/2023         Workstation performed: [id number]         XR chest 1 view portable   ED Interpretation by Red Contreras PA-C (09/24 0236)   ED wet read:  No acute cardiopulmonary disease appreciated. Final Result by Hilda Freeman MD (09/24 6959)      No acute cardiopulmonary disease. Workstation performed: [id number]               09/25/23 1004   PT Last Visit   PT Visit Date 09/25/23   Note Type   Note type Evaluation   Pain Assessment   Pain Score 10 - Worst Possible Pain   Pain Location/Orientation Orientation: Bilateral;Location: Foot; Location: Leg;Location: Hip   Hospital Pain Intervention(s) Medication (See MAR); Repositioned; Ambulation/increased activity; Emotional support; Rest   Restrictions/Precautions   Weight Bearing Precautions Per Order Yes   RLE Weight Bearing Per Order WBAT   LLE Weight Bearing Per Order WBAT   Other Precautions Chair Alarm; Bed Alarm;Multiple lines; Fall Risk;Pain   Home Living   Type of [facility]   Prior Function   Level of [place] Independent with functional mobility; Independent with ADLs;Modified independent with wheelchair   Lives With Son  (son & pt, homeless)   [address] [facility] in the last 6 months 0   Comments Pt w/c bound require assistance for w/c mobility; modified I w/ stand pivot transfer w/ grab bar w/c<>toilet   General   Additional Pertinent History chronic B/L foot ulcerations   Family/Caregiver Present Yes  (son)   Cognition   Overall Cognitive Status WFL   Arousal/Participation Alert   Orientation Level Oriented to person;Oriented to place;Oriented to time   Following Commands Follows one step commands without difficulty Comments pleasant & cooperative   Subjective   Subjective Pt agreeable to PT/OT evals. RUE Assessment   RUE Assessment   (refer to OT)   LUE Assessment   LUE Assessment   (refer to OT)   RLE Assessment   RLE Assessment X  (3-/5 grossly)   LLE Assessment   LLE Assessment X  (3-/5 grossly)   Bed Mobility   Supine to Sit 2  Maximal assistance   Additional items Assist x 2;HOB elevated; Bedrails; Increased time required;Verbal cues;LE management   Sit to Supine 2  Maximal assistance   Additional items Assist x 2; Increased time required;Verbal cues;LE management   Additional Comments cues for techniques & safety   Transfers   Sit to Stand 3  Moderate assistance   Additional items Assist x 2;Bedrails; Increased time required;Verbal cues; Other  (bed height elevated)   Stand to Sit 3  Moderate assistance   Additional items Assist x 2; Increased time required;Verbal cues; Bed elevated   Additional Comments cues for techniques & safety   Ambulation/Elevation   Gait pattern Forward Flexion; Wide EUGENIE; Decreased foot clearance;Poor UE support;Decreased R stance;Decreased L stance; Short stride; Step to;Excessively slow   Gait Assistance 3  Moderate assist   Additional items Assist x 2;Verbal cues; Tactile cues   Assistive Device Rolling walker   Distance 2 steps (1 forward step & 1 backward step)   Ambulation/Elevation Additional Comments unsteady gait but no gross LOB noted; dec amb tolerance 2* to severe BLE pain   Balance   Static Sitting Fair +   Dynamic Sitting Fair   Static Standing Poor  (w/ RW)   Dynamic Standing Poor -  (w/ RW)   Ambulatory Poor -  (w/ RW)   Endurance Deficit   Endurance Deficit Yes   Endurance Deficit Description pain   Activity Tolerance   Activity Tolerance Patient limited by pain;Treatment limited secondary to medical complications (Comment)   Medical Staff Made Aware OTR Richi   Nurse Made Aware BRISA Fine   Assessment   Prognosis Guarded   Problem List Decreased strength;Decreased endurance; Impaired balance;Decreased mobility;Obesity;Pain;Decreased skin integrity   Assessment Pt. 57 y. o.female admitted for Severe sepsis (HCC) w/ Intertrigo, Bacterial conjunctivitis & BLE Cellulitis. Pt referred to PT for mobility assessment & D/C planning. WBAT BLE per Podiatry. Please see above for information re: home set-up & PLOF as well as objective findings during PT assessment. On eval, pt functioning below baseline hence will continue skilled PT to improve function & safety. Pt require maxAx2 for bed mobility, modAx2 for transfers w/ RW & amb trial w/ RW + cues for techniques & safety. Gait deviations as above but no gross LOB noted. Significantly dec amb tolerance 2* to severe BLE pain. The patient's AM-PAC Basic Mobility Inpatient Short Form Raw Score is 7. A Raw score of less than or equal to 16 suggests the patient may benefit from discharge to post-acute rehabilitation services. Please also refer to the recommendation of the Physical Therapist for safe discharge planning. From PT standpoint, due to above mentioned deficits & high risk for falls, pt will benefit from inpt rehab at D/C. No SOB & dizziness reported t/o session. Nsg staff most recent vital signs as follows: /87   Pulse 70   Temp 97.6 °F (36.4 °C) (Oral)   Resp 16   Ht 5' 8" (1.727 m)   Wt 127 kg (280 lb 13.9 oz)   SpO2 97%   BMI 42.71 kg/m² . At end of session, pt back in bed in stable condition, call bell & phone in reach, bed alarm activated, all lines intact. Fall precautions reinforced w/ good understanding. CM to follow. Nsg notified. Co-eval was necessary to complete this PT eval for the pt's best interest given pt's medical acuity & complexity.    Barriers to Discharge Other (Comment)   Barriers to Discharge Comments homelessness   Goals   Patient Goals to get better   STG Expiration Date 10/09/23   Short Term Goal #1 Goals to be met in 14 days; pt will be able to: 1) inc strength & balance by 1/2 grade to improve overall functional mobility & dec fall risk; 2) inc bed mobility to minAx1 for pt to be able to get in/OOB safely w/ proper techniques 100% of the time, to dec caregiver burden & safely function at home; 3) inc transfers to minAx1 for pt to transition safely from one surface to another w/o % of the time, to dec caregiver burden & safely function at home; 4) inc amb w/ RW approx. >10' w/ minAx1 for pt to ambulate as tolerated w/o any % of the time, to dec caregiver burden & safely function at home; 5) modified I for w/c mobility & management on level surface approx. 150'; 6) pt/caregiver ed   PT Treatment Day 0   Plan   Treatment/Interventions Functional transfer training;LE strengthening/ROM; Therapeutic exercise; Endurance training;Patient/family training;Bed mobility;Gait training;Spoke to nursing;OT   PT Frequency 2-3x/wk   Recommendation   PT Discharge Recommendation Post acute rehabilitation services   AM-PAC Basic Mobility Inpatient   Turning in Flat Bed Without Bedrails 2   Lying on Back to Sitting on Edge of Flat Bed Without Bedrails 1   Moving Bed to Chair 1   Standing Up From Chair Using Arms 1   Walk in Room 1   Climb 3-5 Stairs With Railing 1   Basic Mobility Inpatient Raw Score 7   Turning Head Towards Sound 4   Follow Simple Instructions 4   Low Function Basic Mobility Raw Score  15   Low Function Basic Mobility Standardized Score  23.9   Highest Level Of Mobility   JH-HLM Goal 2: Bed activities/Dependent transfer   JH-HLM Achieved 5: Stand (1 or more minutes)   End of Consult   Patient Position at End of Consult Supine;Bed/Chair alarm activated; All needs within reach   End of Consult Comments Pt in stable condition. All needs in reach. All lines intact. Bed alarm activated.    Hx/personal factors: co-morbidities, use of AD, pain, fall risk, assist w/ ADL's, obesity, and homelessness  Examination: dec mobility, dec balance, dec endurance, dec amb, risk for falls, pain  Clinical: unpredictable (ongoing medical status, abnormal lab values, risk for falls, and pain mgt)  Complexity: high    Nabila Baxter

## 2023-09-25 NOTE — ASSESSMENT & PLAN NOTE
· Continue home Norvasc 5 mg daily, hydralazine 10 mg 3 times daily, losartan 100 mg daily and metoprolol tartrate 100 mg every 12 hours  · Blood pressure adequately controlled

## 2023-09-25 NOTE — ASSESSMENT & PLAN NOTE
Pt is a 59-year-old female with past medical history significant for with PMHx of homelessness, legally blind, wheelchair bound, DM2, chronic diabetic foot wounds, CKD, and PE anticoagulation presented to the ED with complaints of bilateral lower extremity redness and swelling for the past 2 weeks    · Patient recently discharged from short term rehab about 2 weeks prior to this admission, after recent [facility] admission for COVID  · She had been living unsheltered since that time.  She reports that her bandages on her feet had not been changed since she left the rehab  Patient presented to the ED fulfilling septic criteria with tachycardia and leukocytosis 16.10, and lactic acidosis  Septic source, multifactorial with bilateral lower extremity cellulitis  CT bilateral lower extremities concerning for cellulitis   CXR with no acute cardiopulmonary abnormalities  UA without evidence of infection  Blood cultures negative thus far  Lactic acid 3.7->1.5  Patient was given IV fluid resuscitation, and empiric antibiotics and admitted  She continues to improve

## 2023-09-25 NOTE — ASSESSMENT & PLAN NOTE
· Patient notes that since leaving short-term rehab she has remained in her wheelchair and not ambulated   · She notes she has become progressively weaker due to this   · PT/OT lashay estrada

## 2023-09-25 NOTE — ASSESSMENT & PLAN NOTE
· Last admitted 8/08-8/15 to [facility] for acute PE and lower extremity DVT  · Discharged on Eliquis, with compliance

## 2023-09-25 NOTE — PLAN OF CARE
Problem: PHYSICAL THERAPY ADULT  Goal: Performs mobility at highest level of function for planned discharge setting. See evaluation for individualized goals. Description: Treatment/Interventions: Functional transfer training, LE strengthening/ROM, Therapeutic exercise, Endurance training, Patient/family training, Bed mobility, Gait training, Spoke to nursing, OT          See flowsheet documentation for full assessment, interventions and recommendations. Note: Prognosis: Guarded  Problem List: Decreased strength, Decreased endurance, Impaired balance, Decreased mobility, Obesity, Pain, Decreased skin integrity  Assessment: Pt. 57 y. o.female admitted for Severe sepsis (HCC) w/ Intertrigo, Bacterial conjunctivitis & BLE Cellulitis. Pt referred to PT for mobility assessment & D/C planning. WBAT BLE per Podiatry. Please see above for information re: home set-up & PLOF as well as objective findings during PT assessment. On eval, pt functioning below baseline hence will continue skilled PT to improve function & safety. Pt require maxAx2 for bed mobility, modAx2 for transfers w/ RW & amb trial w/ RW + cues for techniques & safety. Gait deviations as above but no gross LOB noted. Significantly dec amb tolerance 2* to severe BLE pain. The patient's AM-PAC Basic Mobility Inpatient Short Form Raw Score is 7. A Raw score of less than or equal to 16 suggests the patient may benefit from discharge to post-acute rehabilitation services. Please also refer to the recommendation of the Physical Therapist for safe discharge planning. From PT standpoint, due to above mentioned deficits & high risk for falls, pt will benefit from inpt rehab at D/C. No SOB & dizziness reported t/o session. Nsg staff most recent vital signs as follows: /87   Pulse 70   Temp 97.6 °F (36.4 °C) (Oral)   Resp 16   Ht 5' 8" (1.727 m)   Wt 127 kg (280 lb 13.9 oz)   SpO2 97%   BMI 42.71 kg/m² .  At end of session, pt back in bed in stable condition, call bell & phone in reach, bed alarm activated, all lines intact. Fall precautions reinforced w/ good understanding. CM to follow. Nsg notified. Co-eval was necessary to complete this PT eval for the pt's best interest given pt's medical acuity & complexity. Barriers to Discharge: Other (Comment)  Barriers to Discharge Comments: homelessness  PT Discharge Recommendation: Post acute rehabilitation services    See flowsheet documentation for full assessment.

## 2023-09-25 NOTE — ASSESSMENT & PLAN NOTE
Lab Results   Component Value Date    EGFR 79 09/25/2023    EGFR 72 09/24/2023    EGFR 47 09/24/2023    CREATININE 0.79 09/25/2023    CREATININE 0.85 09/24/2023    CREATININE 1.20 09/24/2023   · POA creatinine 1.2  · Mildly elevated from baseline of 0.8-0.9, but did not meet strict criteria for acute kidney injury  · Patient was given IV fluids and creatinine normalized

## 2023-09-25 NOTE — ASSESSMENT & PLAN NOTE
· Patient appears to have a chronic anemia over the past several years  · Baseline hemoglobin ranging 8-9  · Current hemoglobin at her baseline

## 2023-09-25 NOTE — ASSESSMENT & PLAN NOTE
· Patient presents with bilateral lower extremity erythema  · She had a CT scan of her right lower extremity that revealed, "Right lower extremity subcutaneous edema consistent with nonspecific cellulitis without evidence of drainable abscess collection.  No soft tissue emphysema or osseous destructive change to indicate osteomyelitis"  · CT scan of left lower extremity that revealed, "Left lower extremity diffuse subcutaneous edema indicating nonspecific cellulitis without drainable abscess collection, soft tissue emphysema or osseous destructive change to indicate osteomyelitis"  · Blood cultures negative thus far  · MRSA nares swab pending  · Noted to have bilateral diabetic foot ulcers, locally infected with surrounding erythema and purulent drainage  · Continue antibiotics with Zosyn  · No history of MRSA: We will discontinue vancomycin

## 2023-09-25 NOTE — PROGRESS NOTES
Queta Mcdaniel is a 57 y.o. female who is currently ordered Vancomycin IV with management by the Pharmacy Consult Service. Relevant clinical data and objective / subjective history reviewed. Vancomycin Assessment:  Indication and Goal AUC/Trough:  Soft tissue (goal -600, trough >10)  Clinical Status: stable  Micro:      Renal Function:  SCr: 0.85 mg/dL  CrCl: 94.1 mL/min  Renal replacement: Not on dialysis  Days of Therapy: 2  Current Dose:  Received 1,750 mg x 1 in the ED this AM @ 0214  Vancomycin Plan:  New Dosinmg q12h  Estimated AUC: 457 mcg*hr/mL  Estimated Trough: 11.0mcg/mL  Next Level: Random level with AM labs on   Renal Function Monitoring: Daily BMP and UOP assessment  Pharmacy will continue to follow closely for s/sx of nephrotoxicity, infusion reactions and appropriateness of therapy. BMP and CBC will be ordered per protocol. We will continue to follow the patient’s culture results and clinical progress daily.     Lorene Bosch, Pharmacist

## 2023-09-25 NOTE — ASSESSMENT & PLAN NOTE
Lab Results   Component Value Date    HGBA1C 10.2 (H) 08/24/2023       Recent Labs     09/24/23  2048 09/25/23  0728 09/25/23  1107 09/25/23  1628   POCGLU 165* 160* 194* 197*       Blood Sugar Average: Last 72 hrs:  (P) 217   · Continue home insulin regimen of 18 units of Lantus q12 hours with 5 units of lispro tid with meals  · Blood sugars adequately controlled  · SSI with accucheks

## 2023-09-26 LAB
ANION GAP SERPL CALCULATED.3IONS-SCNC: 4 MMOL/L
BASOPHILS # BLD AUTO: 0.06 THOUSANDS/ÂΜL (ref 0–0.1)
BASOPHILS NFR BLD AUTO: 1 % (ref 0–1)
BUN SERPL-MCNC: 12 MG/DL (ref 5–25)
CALCIUM SERPL-MCNC: 8.4 MG/DL (ref 8.4–10.2)
CHLORIDE SERPL-SCNC: 108 MMOL/L (ref 96–108)
CO2 SERPL-SCNC: 28 MMOL/L (ref 21–32)
CREAT SERPL-MCNC: 0.83 MG/DL (ref 0.6–1.3)
EOSINOPHIL # BLD AUTO: 0.48 THOUSAND/ÂΜL (ref 0–0.61)
EOSINOPHIL NFR BLD AUTO: 4 % (ref 0–6)
ERYTHROCYTE [DISTWIDTH] IN BLOOD BY AUTOMATED COUNT: 16.9 % (ref 11.6–15.1)
GFR SERPL CREATININE-BSD FRML MDRD: 74 ML/MIN/1.73SQ M
GLUCOSE SERPL-MCNC: 123 MG/DL (ref 65–140)
GLUCOSE SERPL-MCNC: 125 MG/DL (ref 65–140)
GLUCOSE SERPL-MCNC: 128 MG/DL (ref 65–140)
GLUCOSE SERPL-MCNC: 190 MG/DL (ref 65–140)
GLUCOSE SERPL-MCNC: 191 MG/DL (ref 65–140)
HCT VFR BLD AUTO: 27.7 % (ref 34.8–46.1)
HGB BLD-MCNC: 8.4 G/DL (ref 11.5–15.4)
IMM GRANULOCYTES # BLD AUTO: 0.1 THOUSAND/UL (ref 0–0.2)
IMM GRANULOCYTES NFR BLD AUTO: 1 % (ref 0–2)
LYMPHOCYTES # BLD AUTO: 3 THOUSANDS/ÂΜL (ref 0.6–4.47)
LYMPHOCYTES NFR BLD AUTO: 26 % (ref 14–44)
MAGNESIUM SERPL-MCNC: 1.9 MG/DL (ref 1.9–2.7)
MCH RBC QN AUTO: 24.1 PG (ref 26.8–34.3)
MCHC RBC AUTO-ENTMCNC: 30.3 G/DL (ref 31.4–37.4)
MCV RBC AUTO: 79 FL (ref 82–98)
MONOCYTES # BLD AUTO: 0.8 THOUSAND/ÂΜL (ref 0.17–1.22)
MONOCYTES NFR BLD AUTO: 7 % (ref 4–12)
NEUTROPHILS # BLD AUTO: 7.34 THOUSANDS/ÂΜL (ref 1.85–7.62)
NEUTS SEG NFR BLD AUTO: 61 % (ref 43–75)
NRBC BLD AUTO-RTO: 0 /100 WBCS
PLATELET # BLD AUTO: 352 THOUSANDS/UL (ref 149–390)
PMV BLD AUTO: 11 FL (ref 8.9–12.7)
POTASSIUM SERPL-SCNC: 3.7 MMOL/L (ref 3.5–5.3)
RBC # BLD AUTO: 3.49 MILLION/UL (ref 3.81–5.12)
SODIUM SERPL-SCNC: 140 MMOL/L (ref 135–147)
WBC # BLD AUTO: 11.78 THOUSAND/UL (ref 4.31–10.16)

## 2023-09-26 PROCEDURE — 82948 REAGENT STRIP/BLOOD GLUCOSE: CPT

## 2023-09-26 PROCEDURE — 83735 ASSAY OF MAGNESIUM: CPT | Performed by: INTERNAL MEDICINE

## 2023-09-26 PROCEDURE — NC001 PR NO CHARGE: Performed by: PODIATRIST

## 2023-09-26 PROCEDURE — 85025 COMPLETE CBC W/AUTO DIFF WBC: CPT | Performed by: INTERNAL MEDICINE

## 2023-09-26 PROCEDURE — 99232 SBSQ HOSP IP/OBS MODERATE 35: CPT | Performed by: INTERNAL MEDICINE

## 2023-09-26 PROCEDURE — 80048 BASIC METABOLIC PNL TOTAL CA: CPT | Performed by: INTERNAL MEDICINE

## 2023-09-26 RX ADMIN — GABAPENTIN 100 MG: 100 CAPSULE ORAL at 08:27

## 2023-09-26 RX ADMIN — PREDNISONE 4 MG: 1 TABLET ORAL at 08:25

## 2023-09-26 RX ADMIN — LOSARTAN POTASSIUM 100 MG: 50 TABLET, FILM COATED ORAL at 08:27

## 2023-09-26 RX ADMIN — METOPROLOL TARTRATE 100 MG: 100 TABLET, FILM COATED ORAL at 21:25

## 2023-09-26 RX ADMIN — ERYTHROMYCIN 0.5 INCH: 5 OINTMENT OPHTHALMIC at 12:14

## 2023-09-26 RX ADMIN — FLUTICASONE FUROATE AND VILANTEROL TRIFENATATE 1 PUFF: 200; 25 POWDER RESPIRATORY (INHALATION) at 08:28

## 2023-09-26 RX ADMIN — PIPERACILLIN SODIUM AND TAZOBACTAM SODIUM 3.38 G: 36; 4.5 INJECTION, POWDER, LYOPHILIZED, FOR SOLUTION INTRAVENOUS at 08:25

## 2023-09-26 RX ADMIN — PIPERACILLIN SODIUM AND TAZOBACTAM SODIUM 3.38 G: 36; 4.5 INJECTION, POWDER, LYOPHILIZED, FOR SOLUTION INTRAVENOUS at 03:25

## 2023-09-26 RX ADMIN — INSULIN GLARGINE 18 UNITS: 100 INJECTION, SOLUTION SUBCUTANEOUS at 08:25

## 2023-09-26 RX ADMIN — ERYTHROMYCIN 0.5 INCH: 5 OINTMENT OPHTHALMIC at 07:07

## 2023-09-26 RX ADMIN — ATORVASTATIN CALCIUM 40 MG: 40 TABLET, FILM COATED ORAL at 08:27

## 2023-09-26 RX ADMIN — INSULIN LISPRO 2 UNITS: 100 INJECTION, SOLUTION INTRAVENOUS; SUBCUTANEOUS at 15:47

## 2023-09-26 RX ADMIN — APIXABAN 5 MG: 5 TABLET, FILM COATED ORAL at 08:27

## 2023-09-26 RX ADMIN — LIDOCAINE 1 PATCH: 700 PATCH TOPICAL at 08:27

## 2023-09-26 RX ADMIN — APIXABAN 5 MG: 5 TABLET, FILM COATED ORAL at 17:40

## 2023-09-26 RX ADMIN — METOPROLOL TARTRATE 100 MG: 100 TABLET, FILM COATED ORAL at 08:27

## 2023-09-26 RX ADMIN — AMLODIPINE BESYLATE 5 MG: 5 TABLET ORAL at 08:27

## 2023-09-26 RX ADMIN — TOPIRAMATE 100 MG: 100 TABLET, FILM COATED ORAL at 21:25

## 2023-09-26 RX ADMIN — INSULIN LISPRO 5 UNITS: 100 INJECTION, SOLUTION INTRAVENOUS; SUBCUTANEOUS at 12:14

## 2023-09-26 RX ADMIN — INSULIN LISPRO 5 UNITS: 100 INJECTION, SOLUTION INTRAVENOUS; SUBCUTANEOUS at 08:25

## 2023-09-26 RX ADMIN — HYDRALAZINE HYDROCHLORIDE 10 MG: 10 TABLET, FILM COATED ORAL at 21:25

## 2023-09-26 RX ADMIN — NYSTATIN: 100000 POWDER TOPICAL at 08:28

## 2023-09-26 RX ADMIN — INSULIN GLARGINE 18 UNITS: 100 INJECTION, SOLUTION SUBCUTANEOUS at 21:22

## 2023-09-26 RX ADMIN — PIPERACILLIN SODIUM AND TAZOBACTAM SODIUM 3.38 G: 36; 4.5 INJECTION, POWDER, LYOPHILIZED, FOR SOLUTION INTRAVENOUS at 21:57

## 2023-09-26 RX ADMIN — INSULIN LISPRO 2 UNITS: 100 INJECTION, SOLUTION INTRAVENOUS; SUBCUTANEOUS at 21:22

## 2023-09-26 RX ADMIN — GABAPENTIN 100 MG: 100 CAPSULE ORAL at 17:40

## 2023-09-26 RX ADMIN — NYSTATIN: 100000 POWDER TOPICAL at 17:41

## 2023-09-26 RX ADMIN — INSULIN LISPRO 5 UNITS: 100 INJECTION, SOLUTION INTRAVENOUS; SUBCUTANEOUS at 15:47

## 2023-09-26 RX ADMIN — ERYTHROMYCIN 0.5 INCH: 5 OINTMENT OPHTHALMIC at 17:40

## 2023-09-26 RX ADMIN — ERYTHROMYCIN 0.5 INCH: 5 OINTMENT OPHTHALMIC at 22:00

## 2023-09-26 RX ADMIN — HYDRALAZINE HYDROCHLORIDE 10 MG: 10 TABLET, FILM COATED ORAL at 08:27

## 2023-09-26 RX ADMIN — HYDRALAZINE HYDROCHLORIDE 10 MG: 10 TABLET, FILM COATED ORAL at 15:49

## 2023-09-26 RX ADMIN — PIPERACILLIN SODIUM AND TAZOBACTAM SODIUM 3.38 G: 36; 4.5 INJECTION, POWDER, LYOPHILIZED, FOR SOLUTION INTRAVENOUS at 15:16

## 2023-09-26 NOTE — ASSESSMENT & PLAN NOTE
Pt is a 78-year-old female with past medical history significant for with PMHx of homelessness, legally blind, wheelchair bound, DM2, chronic diabetic foot wounds, CKD, and PE anticoagulation presented to the ED with complaints of bilateral lower extremity redness and swelling for the past 2 weeks    · Patient recently discharged from short term rehab about 2 weeks prior to this admission, after recent [facility] [place] admission for COVID  · She had been living unsheltered since that time. She reports that her bandages on her feet had not been changed since she left the rehab  Patient presented to the ED fulfilling septic criteria with tachycardia and leukocytosis 16.10, and lactic acidosis  Septic source, multifactorial with bilateral lower extremity cellulitis  CT bilateral lower extremities concerning for cellulitis   CXR with no acute cardiopulmonary abnormalities  UA without evidence of infection  Blood cultures negative thus far  Lactic acid 3.7->1.5  Patient was given IV fluid resuscitation, and empiric antibiotics and admitted  She continues to improve:  Afebrile, with improved leukocytosis

## 2023-09-26 NOTE — PLAN OF CARE
Problem: MOBILITY - ADULT  Goal: Maintain or return to baseline ADL function  Description: INTERVENTIONS:  -  Assess patient's ability to carry out ADLs; assess patient's baseline for ADL function and identify physical deficits which impact ability to perform ADLs (bathing, care of mouth/teeth, toileting, grooming, dressing, etc.)  - Assess/evaluate cause of self-care deficits   - Assess range of motion  - Assess patient's mobility; develop plan if impaired  - Assess patient's need for assistive devices and provide as appropriate  - Encourage maximum independence but intervene and supervise when necessary  - Involve family in performance of ADLs  - Assess for home care needs following discharge   - Consider OT consult to assist with ADL evaluation and planning for discharge  - Provide patient education as appropriate  Outcome: Progressing  Goal: Maintains/Returns to pre admission functional level  Description: INTERVENTIONS:  - Perform BMAT or MOVE assessment daily.   - Set and communicate daily mobility goal to care team and patient/family/caregiver. - Collaborate with rehabilitation services on mobility goals if consulted  - Perform Range of Motion 3 times a day. - Reposition patient every 2 hours.   - Dangle patient 3 times a day  - Stand patient 3 times a day  - Ambulate patient 3 times a day  - Out of bed to chair 3 times a day   - Out of bed for meals 3 times a day  - Out of bed for toileting  - Record patient progress and toleration of activity level   Outcome: Progressing     Problem: PAIN - ADULT  Goal: Verbalizes/displays adequate comfort level or baseline comfort level  Description: Interventions:  - Encourage patient to monitor pain and request assistance  - Assess pain using appropriate pain scale  - Administer analgesics based on type and severity of pain and evaluate response  - Implement non-pharmacological measures as appropriate and evaluate response  - Consider cultural and social influences on pain and pain management  - Notify physician/advanced practitioner if interventions unsuccessful or patient reports new pain  Outcome: Progressing     Problem: INFECTION - ADULT  Goal: Absence or prevention of progression during hospitalization  Description: INTERVENTIONS:  - Assess and monitor for signs and symptoms of infection  - Monitor lab/diagnostic results  - Monitor all insertion sites, i.e. indwelling lines, tubes, and drains  - Monitor endotracheal if appropriate and nasal secretions for changes in amount and color  - [place] appropriate cooling/warming therapies per order  - Administer medications as ordered  - Instruct and encourage patient and family to use good hand hygiene technique  - Identify and instruct in appropriate isolation precautions for identified infection/condition  Outcome: Progressing     Problem: SAFETY ADULT  Goal: Maintain or return to baseline ADL function  Description: INTERVENTIONS:  -  Assess patient's ability to carry out ADLs; assess patient's baseline for ADL function and identify physical deficits which impact ability to perform ADLs (bathing, care of mouth/teeth, toileting, grooming, dressing, etc.)  - Assess/evaluate cause of self-care deficits   - Assess range of motion  - Assess patient's mobility; develop plan if impaired  - Assess patient's need for assistive devices and provide as appropriate  - Encourage maximum independence but intervene and supervise when necessary  - Involve family in performance of ADLs  - Assess for home care needs following discharge   - Consider OT consult to assist with ADL evaluation and planning for discharge  - Provide patient education as appropriate  Outcome: Progressing  Goal: Maintains/Returns to pre admission functional level  Description: INTERVENTIONS:  - Perform BMAT or MOVE assessment daily.   - Set and communicate daily mobility goal to care team and patient/family/caregiver.    - Collaborate with rehabilitation services on mobility goals if consulted  - Perform Range of Motion 3 times a day. - Reposition patient every 2 hours.   - Dangle patient 3 times a day  - Stand patient 3 times a day  - Ambulate patient 3 times a day  - Out of bed to chair 3 times a day   - Out of bed for meals 3 times a day  - Out of bed for toileting  - Record patient progress and toleration of activity level   Outcome: Progressing  Goal: Patient will remain free of falls  Description: INTERVENTIONS:  -  Assess patient's ability to carry out ADLs; assess patient's baseline for ADL function and identify physical deficits which impact ability to perform ADLs (bathing, care of mouth/teeth, toileting, grooming, dressing, etc.)  - Assess/evaluate cause of self-care deficits   - Assess range of motion  - Assess patient's mobility; develop plan if impaired  - Assess patient's need for assistive devices and provide as appropriate  - Encourage maximum independence but intervene and supervise when necessary  - Involve family in performance of ADLs  - Assess for home care needs following discharge   - Consider OT consult to assist with ADL evaluation and planning for discharge  - Provide patient education as appropriate  Outcome: Progressing     Problem: DISCHARGE PLANNING  Goal: Discharge to home or other facility with appropriate resources  Description: INTERVENTIONS:  - Identify barriers to discharge w/patient and caregiver  - Arrange for needed discharge resources and transportation as appropriate  - Identify discharge learning needs (meds, wound care, etc.)  - Arrange for interpretive services to assist at discharge as needed  - Refer to Case Management Department for coordinating discharge planning if the patient needs post-hospital services based on physician/advanced practitioner order or complex needs related to functional status, cognitive ability, or social support system  Outcome: Progressing     Problem: Knowledge Deficit  Goal: Patient/family/caregiver demonstrates understanding of disease process, treatment plan, medications, and discharge instructions  Description: Complete learning assessment and assess knowledge base.   Interventions:  - Provide teaching at level of understanding  - Provide teaching via preferred learning methods  Outcome: Progressing     Problem: CARDIOVASCULAR - ADULT  Goal: Maintains optimal cardiac output and hemodynamic stability  Description: INTERVENTIONS:  - Monitor I/O, vital signs and rhythm  - Monitor for S/S and trends of decreased cardiac output  - Administer and titrate ordered vasoactive medications to optimize hemodynamic stability  - Assess quality of pulses, skin color and temperature  - Assess for signs of decreased coronary artery perfusion  - Instruct patient to report change in severity of symptoms  Outcome: Progressing  Goal: Absence of cardiac dysrhythmias or at baseline rhythm  Description: INTERVENTIONS:  - Continuous cardiac monitoring, vital signs, obtain 12 lead EKG if ordered  - Administer antiarrhythmic and heart rate control medications as ordered  - Monitor electrolytes and administer replacement therapy as ordered  Outcome: Progressing     Problem: RESPIRATORY - ADULT  Goal: Achieves optimal ventilation and oxygenation  Description: INTERVENTIONS:  - Assess for changes in respiratory status  - Assess for changes in mentation and behavior  - Position to facilitate oxygenation and minimize respiratory effort  - Oxygen administered by appropriate delivery if ordered  - Initiate smoking cessation education as indicated  - Encourage broncho-pulmonary hygiene including cough, deep breathe, Incentive Spirometry  - Assess the need for suctioning and aspirate as needed  - Assess and instruct to report SOB or any respiratory difficulty  - Respiratory Therapy support as indicated  Outcome: Progressing     Problem: METABOLIC, FLUID AND ELECTROLYTES - ADULT  Goal: Glucose maintained within target range  Description: INTERVENTIONS:  - Monitor Blood Glucose as ordered  - Assess for signs and symptoms of hyperglycemia and hypoglycemia  - Administer ordered medications to maintain glucose within target range  - Assess nutritional intake and initiate nutrition service referral as needed  Outcome: Progressing     Problem: SKIN/TISSUE INTEGRITY - ADULT  Goal: Incision(s), wounds(s) or drain site(s) healing without S/S of infection  Description: INTERVENTIONS  - Assess and document dressing, incision, wound bed, drain sites and surrounding tissue  - Provide patient and family education  - Perform skin care/dressing changes every shift  Outcome: Progressing     Problem: Prexisting or High Potential for Compromised Skin Integrity  Goal: Skin integrity is maintained or improved  Description: INTERVENTIONS:  - Identify patients at risk for skin breakdown  - Assess and monitor skin integrity  - Assess and monitor nutrition and hydration status  - Monitor labs   - Assess for incontinence   - Turn and reposition patient  - Assist with mobility/ambulation  - Relieve pressure over bony prominences  - Avoid friction and shearing  - Provide appropriate hygiene as needed including keeping skin clean and dry  - Evaluate need for skin moisturizer/barrier cream  - Collaborate with interdisciplinary team   - Patient/family teaching  - Consider wound care consult   Outcome: Progressing     Problem: Nutrition/Hydration-ADULT  Goal: Nutrient/Hydration intake appropriate for improving, restoring or maintaining nutritional needs  Description: Monitor and assess patient's nutrition/hydration status for malnutrition. Collaborate with interdisciplinary team and initiate plan and interventions as ordered. Monitor patient's weight and dietary intake as ordered or per policy. Utilize nutrition screening tool and intervene as necessary. Determine patient's food preferences and provide high-protein, high-caloric foods as appropriate.      INTERVENTIONS:  - Monitor oral intake, urinary output, labs, and treatment plans  - Assess nutrition and hydration status and recommend course of action  - Evaluate amount of meals eaten  - Assist patient with eating if necessary   - Allow adequate time for meals  - Recommend/ encourage appropriate diets, oral nutritional supplements, and vitamin/mineral supplements  - Order, calculate, and assess calorie counts as needed  - Recommend, monitor, and adjust tube feedings  based on assessed needs  - Assess need for intravenous fluids  - Provide  nutrition/hydration education as appropriate  - Include patient/family/caregiver in decisions related to nutrition  Outcome: Progressing

## 2023-09-26 NOTE — ASSESSMENT & PLAN NOTE
· Last admitted 8/08-8/15 to [facility] for acute PE and lower extremity DVT  · Discharged on Eliquis, with compliance

## 2023-09-26 NOTE — PLAN OF CARE
Problem: MOBILITY - ADULT  Goal: Maintain or return to baseline ADL function  Description: INTERVENTIONS:  -  Assess patient's ability to carry out ADLs; assess patient's baseline for ADL function and identify physical deficits which impact ability to perform ADLs (bathing, care of mouth/teeth, toileting, grooming, dressing, etc.)  - Assess/evaluate cause of self-care deficits   - Assess range of motion  - Assess patient's mobility; develop plan if impaired  - Assess patient's need for assistive devices and provide as appropriate  - Encourage maximum independence but intervene and supervise when necessary  - Involve family in performance of ADLs  - Assess for home care needs following discharge   - Consider OT consult to assist with ADL evaluation and planning for discharge  - Provide patient education as appropriate  9/26/2023 0855 by Maureen Art  Outcome: Progressing  9/25/2023 2015 by Maureen Art  Outcome: Progressing  Goal: Maintains/Returns to pre admission functional level  Description: INTERVENTIONS:  - Perform BMAT or MOVE assessment daily.   - Set and communicate daily mobility goal to care team and patient/family/caregiver. - Collaborate with rehabilitation services on mobility goals if consulted  - Perform Range of Motion 3 times a day. - Reposition patient every 2 hours.   - Dangle patient 3 times a day  - Stand patient 3 times a day  - Ambulate patient 3 times a day  - Out of bed to chair 3 times a day   - Out of bed for meals 3 times a day  - Out of bed for toileting  - Record patient progress and toleration of activity level   9/26/2023 0855 by Maureen Art  Outcome: Progressing  9/25/2023 2015 by Maureen Art  Outcome: Progressing     Problem: PAIN - ADULT  Goal: Verbalizes/displays adequate comfort level or baseline comfort level  Description: Interventions:  - Encourage patient to monitor pain and request assistance  - Assess pain using appropriate pain scale  - Administer analgesics based on type and severity of pain and evaluate response  - Implement non-pharmacological measures as appropriate and evaluate response  - Consider cultural and social influences on pain and pain management  - Notify physician/advanced practitioner if interventions unsuccessful or patient reports new pain  9/26/2023 0855 by Gilmar Pump  Outcome: Progressing  9/25/2023 2015 by Gilmar Pump  Outcome: Progressing     Problem: INFECTION - ADULT  Goal: Absence or prevention of progression during hospitalization  Description: INTERVENTIONS:  - Assess and monitor for signs and symptoms of infection  - Monitor lab/diagnostic results  - Monitor all insertion sites, i.e. indwelling lines, tubes, and drains  - Monitor endotracheal if appropriate and nasal secretions for changes in amount and color  - [place] appropriate cooling/warming therapies per order  - Administer medications as ordered  - Instruct and encourage patient and family to use good hand hygiene technique  - Identify and instruct in appropriate isolation precautions for identified infection/condition  9/26/2023 0855 by Gilmar Pump  Outcome: Progressing  9/25/2023 2015 by Gilmar Pump  Outcome: Progressing     Problem: SAFETY ADULT  Goal: Maintain or return to baseline ADL function  Description: INTERVENTIONS:  -  Assess patient's ability to carry out ADLs; assess patient's baseline for ADL function and identify physical deficits which impact ability to perform ADLs (bathing, care of mouth/teeth, toileting, grooming, dressing, etc.)  - Assess/evaluate cause of self-care deficits   - Assess range of motion  - Assess patient's mobility; develop plan if impaired  - Assess patient's need for assistive devices and provide as appropriate  - Encourage maximum independence but intervene and supervise when necessary  - Involve family in performance of ADLs  - Assess for home care needs following discharge   - Consider OT consult to assist with ADL evaluation and planning for discharge  - Provide patient education as appropriate  9/26/2023 0855 by Edy Luz  Outcome: Progressing  9/25/2023 2015 by Edy Luz  Outcome: Progressing  Goal: Maintains/Returns to pre admission functional level  Description: INTERVENTIONS:  - Perform BMAT or MOVE assessment daily.   - Set and communicate daily mobility goal to care team and patient/family/caregiver. - Collaborate with rehabilitation services on mobility goals if consulted  - Perform Range of Motion 3 times a day. - Reposition patient every 2 hours.   - Dangle patient 3 times a day  - Stand patient 3 times a day  - Ambulate patient 3 times a day  - Out of bed to chair 3 times a day   - Out of bed for meals 3 times a day  - Out of bed for toileting  - Record patient progress and toleration of activity level   9/26/2023 0855 by Edy Luz  Outcome: Progressing  9/25/2023 2015 by Edy Luz  Outcome: Progressing  Goal: Patient will remain free of falls  Description: INTERVENTIONS:  - Educate patient/family on patient safety including physical limitations  - Instruct patient to call for assistance with activity   - Consult OT/PT to assist with strengthening/mobility   - Keep Call bell within reach  - Keep bed low and locked with side rails adjusted as appropriate  - Keep care items and personal belongings within reach  - Initiate and maintain comfort rounds  - Make Fall Risk Sign visible to staff  - Offer Toileting every 2 Hours, in advance of need  - Initiate/Maintain bed alarm  - Apply yellow socks and bracelet for high fall risk patients  - Consider moving patient to room near nurses station  9/26/2023 0855 by Edy Luz  Outcome: Progressing  9/25/2023 2015 by Edy Luz  Outcome: Progressing     Problem: DISCHARGE PLANNING  Goal: Discharge to home or other facility with appropriate resources  Description: INTERVENTIONS:  - Identify barriers to discharge w/patient and caregiver  - Arrange for needed discharge resources and transportation as appropriate  - Identify discharge learning needs (meds, wound care, etc.)  - Arrange for interpretive services to assist at discharge as needed  - Refer to Case Management Department for coordinating discharge planning if the patient needs post-hospital services based on physician/advanced practitioner order or complex needs related to functional status, cognitive ability, or social support system  9/26/2023 0855 by Rona Chang  Outcome: Progressing  9/25/2023 2015 by Rona Chang  Outcome: Progressing     Problem: Knowledge Deficit  Goal: Patient/family/caregiver demonstrates understanding of disease process, treatment plan, medications, and discharge instructions  Description: Complete learning assessment and assess knowledge base.   Interventions:  - Provide teaching at level of understanding  - Provide teaching via preferred learning methods  9/26/2023 0855 by Rona Chang  Outcome: Progressing  9/25/2023 2015 by Rona Chang  Outcome: Progressing     Problem: CARDIOVASCULAR - ADULT  Goal: Maintains optimal cardiac output and hemodynamic stability  Description: INTERVENTIONS:  - Monitor I/O, vital signs and rhythm  - Monitor for S/S and trends of decreased cardiac output  - Administer and titrate ordered vasoactive medications to optimize hemodynamic stability  - Assess quality of pulses, skin color and temperature  - Assess for signs of decreased coronary artery perfusion  - Instruct patient to report change in severity of symptoms  9/26/2023 0855 by Rona Chang  Outcome: Progressing  9/25/2023 2015 by Rona Chang  Outcome: Progressing  Goal: Absence of cardiac dysrhythmias or at baseline rhythm  Description: INTERVENTIONS:  - Continuous cardiac monitoring, vital signs, obtain 12 lead EKG if ordered  - Administer antiarrhythmic and heart rate control medications as ordered  - Monitor electrolytes and administer replacement therapy as ordered  9/26/2023 0651 by Selma Reynolds  Outcome: Progressing  9/25/2023 2015 by Selma Reynolds  Outcome: Progressing     Problem: RESPIRATORY - ADULT  Goal: Achieves optimal ventilation and oxygenation  Description: INTERVENTIONS:  - Assess for changes in respiratory status  - Assess for changes in mentation and behavior  - Position to facilitate oxygenation and minimize respiratory effort  - Oxygen administered by appropriate delivery if ordered  - Initiate smoking cessation education as indicated  - Encourage broncho-pulmonary hygiene including cough, deep breathe, Incentive Spirometry  - Assess the need for suctioning and aspirate as needed  - Assess and instruct to report SOB or any respiratory difficulty  - Respiratory Therapy support as indicated  9/26/2023 0855 by Selma Reynolds  Outcome: Progressing  9/25/2023 2015 by Selma Reynolds  Outcome: Progressing     Problem: METABOLIC, FLUID AND ELECTROLYTES - ADULT  Goal: Glucose maintained within target range  Description: INTERVENTIONS:  - Monitor Blood Glucose as ordered  - Assess for signs and symptoms of hyperglycemia and hypoglycemia  - Administer ordered medications to maintain glucose within target range  - Assess nutritional intake and initiate nutrition service referral as needed  9/26/2023 0855 by Selma Reynolds  Outcome: Progressing  9/25/2023 2015 by Selma Reynolds  Outcome: Progressing     Problem: SKIN/TISSUE INTEGRITY - ADULT  Goal: Incision(s), wounds(s) or drain site(s) healing without S/S of infection  Description: INTERVENTIONS  - Assess and document dressing, incision, wound bed, drain sites and surrounding tissue  - Provide patient and family education  - Perform skin care/dressing changes  9/26/2023 0855 by Selma Reynolds  Outcome: Progressing  9/25/2023 2015 by Selma Reynolds  Outcome: Progressing     Problem: Prexisting or High Potential for Compromised Skin Integrity  Goal: Skin integrity is maintained or improved  Description: INTERVENTIONS:  - Identify patients at risk for skin breakdown  - Assess and monitor skin integrity  - Assess and monitor nutrition and hydration status  - Monitor labs   - Assess for incontinence   - Turn and reposition patient  - Assist with mobility/ambulation  - Relieve pressure over bony prominences  - Avoid friction and shearing  - Provide appropriate hygiene as needed including keeping skin clean and dry  - Evaluate need for skin moisturizer/barrier cream  - Collaborate with interdisciplinary team   - Patient/family teaching  - Consider wound care consult   9/26/2023 0855 by Opal Clark  Outcome: Progressing  9/25/2023 2015 by Opal Clark  Outcome: Progressing     Problem: Nutrition/Hydration-ADULT  Goal: Nutrient/Hydration intake appropriate for improving, restoring or maintaining nutritional needs  Description: Monitor and assess patient's nutrition/hydration status for malnutrition. Collaborate with interdisciplinary team and initiate plan and interventions as ordered. Monitor patient's weight and dietary intake as ordered or per policy. Utilize nutrition screening tool and intervene as necessary. Determine patient's food preferences and provide high-protein, high-caloric foods as appropriate.      INTERVENTIONS:  - Monitor oral intake, urinary output, labs, and treatment plans  - Assess nutrition and hydration status and recommend course of action  - Evaluate amount of meals eaten  - Assist patient with eating if necessary   - Allow adequate time for meals  - Recommend/ encourage appropriate diets, oral nutritional supplements, and vitamin/mineral supplements  - Order, calculate, and assess calorie counts as needed  - Recommend, monitor, and adjust tube feedings  based on assessed needs  - Assess need for intravenous fluids  - Provide  nutrition/hydration education as appropriate  - Include patient/family/caregiver in decisions related to nutrition  9/26/2023 0855 by Opal Clark  Outcome: Progressing  9/25/2023 2015 by Selma Reynolds  Outcome: Progressing

## 2023-09-26 NOTE — PHYSICAL THERAPY NOTE
09/26/23 1336   PT Last Visit   PT Visit Date 09/26/23   Note Type   Note Type Cancelled Session   Cancel Reasons Other          Physical Therapy Cancellation Note     PT treatment attempted this afternoon but patient was unavailable due receiving  beds side treatment. Will continue to offer PT treatment as ordered and progress as tolerated when patient is available to participate.        Priya Corral

## 2023-09-26 NOTE — ASSESSMENT & PLAN NOTE
Lab Results   Component Value Date    HGBA1C 10.2 (H) 08/24/2023       Recent Labs     09/25/23  2100 09/26/23  0702 09/26/23  1124 09/26/23  1544   POCGLU 128 123 125 191*       Blood Sugar Average: Last 72 hrs:  (P) [id number]   · Continue home insulin regimen of 18 units of Lantus q12 hours with 5 units of lispro tid with meals  · Blood sugars adequately controlled  · SSI with accucheks

## 2023-09-26 NOTE — PLAN OF CARE
Problem: MOBILITY - ADULT  Goal: Maintain or return to baseline ADL function  Description: INTERVENTIONS:  -  Assess patient's ability to carry out ADLs; assess patient's baseline for ADL function and identify physical deficits which impact ability to perform ADLs (bathing, care of mouth/teeth, toileting, grooming, dressing, etc.)  - Assess/evaluate cause of self-care deficits   - Assess range of motion  - Assess patient's mobility; develop plan if impaired  - Assess patient's need for assistive devices and provide as appropriate  - Encourage maximum independence but intervene and supervise when necessary  - Involve family in performance of ADLs  - Assess for home care needs following discharge   - Consider OT consult to assist with ADL evaluation and planning for discharge  - Provide patient education as appropriate  Outcome: Progressing  Goal: Maintains/Returns to pre admission functional level  Description: INTERVENTIONS:  - Perform BMAT or MOVE assessment daily.   - Set and communicate daily mobility goal to care team and patient/family/caregiver. - Collaborate with rehabilitation services on mobility goals if consulted  - Perform Range of Motion 3 times a day. - Reposition patient every 2 hours.   - Dangle patient 3 times a day  - Stand patient 3 times a day  - Ambulate patient 3 times a day  - Out of bed to chair 3 times a day   - Out of bed for meals 3 times a day  - Out of bed for toileting  - Record patient progress and toleration of activity level   Outcome: Progressing     Problem: PAIN - ADULT  Goal: Verbalizes/displays adequate comfort level or baseline comfort level  Description: Interventions:  - Encourage patient to monitor pain and request assistance  - Assess pain using appropriate pain scale  - Administer analgesics based on type and severity of pain and evaluate response  - Implement non-pharmacological measures as appropriate and evaluate response  - Consider cultural and social influences on pain and pain management  - Notify physician/advanced practitioner if interventions unsuccessful or patient reports new pain  Outcome: Progressing     Problem: INFECTION - ADULT  Goal: Absence or prevention of progression during hospitalization  Description: INTERVENTIONS:  - Assess and monitor for signs and symptoms of infection  - Monitor lab/diagnostic results  - Monitor all insertion sites, i.e. indwelling lines, tubes, and drains  - Monitor endotracheal if appropriate and nasal secretions for changes in amount and color  - [place] appropriate cooling/warming therapies per order  - Administer medications as ordered  - Instruct and encourage patient and family to use good hand hygiene technique  - Identify and instruct in appropriate isolation precautions for identified infection/condition  Outcome: Progressing     Problem: SAFETY ADULT  Goal: Maintain or return to baseline ADL function  Description: INTERVENTIONS:  -  Assess patient's ability to carry out ADLs; assess patient's baseline for ADL function and identify physical deficits which impact ability to perform ADLs (bathing, care of mouth/teeth, toileting, grooming, dressing, etc.)  - Assess/evaluate cause of self-care deficits   - Assess range of motion  - Assess patient's mobility; develop plan if impaired  - Assess patient's need for assistive devices and provide as appropriate  - Encourage maximum independence but intervene and supervise when necessary  - Involve family in performance of ADLs  - Assess for home care needs following discharge   - Consider OT consult to assist with ADL evaluation and planning for discharge  - Provide patient education as appropriate  Outcome: Progressing  Goal: Maintains/Returns to pre admission functional level  Description: INTERVENTIONS:  - Perform BMAT or MOVE assessment daily.   - Set and communicate daily mobility goal to care team and patient/family/caregiver.    - Collaborate with rehabilitation services on mobility goals if consulted  - Perform Range of Motion 3 times a day. - Reposition patient every 2 hours.   - Dangle patient 3 times a day  - Stand patient 3 times a day  - Ambulate patient 3 times a day  - Out of bed to chair 3 times a day   - Out of bed for meals 3 times a day  - Out of bed for toileting  - Record patient progress and toleration of activity level   Outcome: Progressing  Goal: Patient will remain free of falls  Description: INTERVENTIONS:  - Educate patient/family on patient safety including physical limitations  - Instruct patient to call for assistance with activity   - Consult OT/PT to assist with strengthening/mobility   - Keep Call bell within reach  - Keep bed low and locked with side rails adjusted as appropriate  - Keep care items and personal belongings within reach  - Initiate and maintain comfort rounds  - Make Fall Risk Sign visible to staff  - Offer Toileting every 2 Hours, in advance of need  - Initiate/Maintain bed alarm  - Apply yellow socks and bracelet for high fall risk patients  - Consider moving patient to room near nurses station  Outcome: Progressing     Problem: DISCHARGE PLANNING  Goal: Discharge to home or other facility with appropriate resources  Description: INTERVENTIONS:  - Identify barriers to discharge w/patient and caregiver  - Arrange for needed discharge resources and transportation as appropriate  - Identify discharge learning needs (meds, wound care, etc.)  - Arrange for interpretive services to assist at discharge as needed  - Refer to Case Management Department for coordinating discharge planning if the patient needs post-hospital services based on physician/advanced practitioner order or complex needs related to functional status, cognitive ability, or social support system  Outcome: Progressing     Problem: Knowledge Deficit  Goal: Patient/family/caregiver demonstrates understanding of disease process, treatment plan, medications, and discharge instructions  Description: Complete learning assessment and assess knowledge base.   Interventions:  - Provide teaching at level of understanding  - Provide teaching via preferred learning methods  Outcome: Progressing     Problem: CARDIOVASCULAR - ADULT  Goal: Maintains optimal cardiac output and hemodynamic stability  Description: INTERVENTIONS:  - Monitor I/O, vital signs and rhythm  - Monitor for S/S and trends of decreased cardiac output  - Administer and titrate ordered vasoactive medications to optimize hemodynamic stability  - Assess quality of pulses, skin color and temperature  - Assess for signs of decreased coronary artery perfusion  - Instruct patient to report change in severity of symptoms  Outcome: Progressing  Goal: Absence of cardiac dysrhythmias or at baseline rhythm  Description: INTERVENTIONS:  - Continuous cardiac monitoring, vital signs, obtain 12 lead EKG if ordered  - Administer antiarrhythmic and heart rate control medications as ordered  - Monitor electrolytes and administer replacement therapy as ordered  Outcome: Progressing     Problem: RESPIRATORY - ADULT  Goal: Achieves optimal ventilation and oxygenation  Description: INTERVENTIONS:  - Assess for changes in respiratory status  - Assess for changes in mentation and behavior  - Position to facilitate oxygenation and minimize respiratory effort  - Oxygen administered by appropriate delivery if ordered  - Initiate smoking cessation education as indicated  - Encourage broncho-pulmonary hygiene including cough, deep breathe, Incentive Spirometry  - Assess the need for suctioning and aspirate as needed  - Assess and instruct to report SOB or any respiratory difficulty  - Respiratory Therapy support as indicated  Outcome: Progressing     Problem: METABOLIC, FLUID AND ELECTROLYTES - ADULT  Goal: Glucose maintained within target range  Description: INTERVENTIONS:  - Monitor Blood Glucose as ordered  - Assess for signs and symptoms of hyperglycemia and hypoglycemia  - Administer ordered medications to maintain glucose within target range  - Assess nutritional intake and initiate nutrition service referral as needed  Outcome: Progressing     Problem: SKIN/TISSUE INTEGRITY - ADULT  Goal: Incision(s), wounds(s) or drain site(s) healing without S/S of infection  Description: INTERVENTIONS  - Assess and document dressing, incision, wound bed, drain sites and surrounding tissue  - Provide patient and family education  - Perform skin care/dressing changes   Outcome: Progressing     Problem: Prexisting or High Potential for Compromised Skin Integrity  Goal: Skin integrity is maintained or improved  Description: INTERVENTIONS:  - Identify patients at risk for skin breakdown  - Assess and monitor skin integrity  - Assess and monitor nutrition and hydration status  - Monitor labs   - Assess for incontinence   - Turn and reposition patient  - Assist with mobility/ambulation  - Relieve pressure over bony prominences  - Avoid friction and shearing  - Provide appropriate hygiene as needed including keeping skin clean and dry  - Evaluate need for skin moisturizer/barrier cream  - Collaborate with interdisciplinary team   - Patient/family teaching  - Consider wound care consult   Outcome: Progressing     Problem: Nutrition/Hydration-ADULT  Goal: Nutrient/Hydration intake appropriate for improving, restoring or maintaining nutritional needs  Description: Monitor and assess patient's nutrition/hydration status for malnutrition. Collaborate with interdisciplinary team and initiate plan and interventions as ordered. Monitor patient's weight and dietary intake as ordered or per policy. Utilize nutrition screening tool and intervene as necessary. Determine patient's food preferences and provide high-protein, high-caloric foods as appropriate.      INTERVENTIONS:  - Monitor oral intake, urinary output, labs, and treatment plans  - Assess nutrition and hydration status and recommend course of action  - Evaluate amount of meals eaten  - Assist patient with eating if necessary   - Allow adequate time for meals  - Recommend/ encourage appropriate diets, oral nutritional supplements, and vitamin/mineral supplements  - Order, calculate, and assess calorie counts as needed  - Recommend, monitor, and adjust tube feedings  based on assessed needs  - Assess need for intravenous fluids  - Provide  nutrition/hydration education as appropriate  - Include patient/family/caregiver in decisions related to nutrition  Outcome: Progressing

## 2023-09-26 NOTE — PROGRESS NOTES
Podiatry - Progress Note  Patient: Afshan Dominguez 57 y.o. female   MRN: [id number]  PCP: Sapphire Kam MD  Unit/Bed#: E5 -38 Encounter: [id number]  Date Of Visit: 23    ASSESSMENT:    Afshan Dominguez is a 57 y.o. female with:    1. Severe sepsis  2. Bilateral diabetic foot ulcers- POA  3. Type 2 diabetes mellitus   4. Stage 3a CKD    PLAN:    · Plan for continued local wound care consisting of Maxorb DSD to the right and Betadine DSD to the left. Loosely adhered eschar of plantar right foot removed with pickups and 15-blade, underlying wound granular/fibrotic. · WBC of 11.78 today, down-trending from 12.80 on . Will continue to trend labs/vitals. · CT of bilateral lower extremities reviewed: Right lower extremity subcutaneous edema consistent with nonspecific cellulitis without evidence of drainable abscess collection. Left lower extremity diffuse subcutaneous edema indicating nonspecific cellulitis without drainable abscess collection, soft tissue emphysema or osseous destructive change to indicate osteomyelitis. · Recommend continued offloading of bilateral feet when non-ambulatory. · Continue local wound care, appreciate nursing assistance with dressing changes. · Elevation and offloading on green foam wedges or pillows when non-ambulatory. · Rest of care per primary team.     Weightbearing status: Weightbearing as tolerated    SUBJECTIVE:     The patient was seen, evaluated, and assessed at bedside today. The patient was awake, alert, and in no acute distress. No acute events overnight. The patient reports that she is feeling much better today, with less lowe extremity pain. Patient denies N/V/F/chills/SOB/CP.       OBJECTIVE:     Vitals:   BP (!) 171/103   Pulse 68   Temp 98.4 °F (36.9 °C) (Oral)   Resp 18   Ht 5' 8" (1.727 m)   Wt 123 kg (270 lb 8.1 oz)   SpO2 98%   BMI 41.13 kg/m²     Temp (24hrs), Av.2 °F (36.8 °C), Min:97.9 °F (36.6 °C), Max:98.4 °F (36.9 °C)      Physical Exam:     Lungs: Non labored breathing  Abdomen: Soft, non-tender. Lower Extremity:  Cardiovascular status at baseline from admission. Neurological status at baseline from admission. Musculoskeletal status at baseline from admission. No calf tenderness noted. Wound #: 1  Location: Right plantar foot   Length 2.0 cm: Width 3.5cm: Depth 0.2cm:   Deepest Tissue Noted in Base: Subcutaneous   Probe to Bone: No  Peripheral Skin Description: Attached  Granulation: 0% Fibrotic Tissue: 100% Necrotic Tissue: 0%   Drainage Amount: minimal, serous  Signs of Infection: No       Plantar right foot eschar, well adhered with adriana-wound erythema. No fluctuance or crepitus on palpation.      Plantar left foot eschar that is loosely adhered with no purulent drainage on palpation. Adriana-wound erythema. No fluctuance or crepitus on palpation.      Bilateral lower extremities are edematous, with dusky discoloration to the legs. Clinical Images 09/26/23: Additional Data:     Labs:    Results from last 7 days   Lab Units 09/26/23  0453   WBC Thousand/uL 11.78*   HEMOGLOBIN g/dL 8.4*   HEMATOCRIT % 27.7*   PLATELETS Thousands/uL 352   NEUTROS PCT % 61   LYMPHS PCT % 26   MONOS PCT % 7   EOS PCT % 4     Results from last 7 days   Lab Units 09/26/23  0453 09/25/23  0940 09/24/23  0830   POTASSIUM mmol/L 3.7   < > 4.1   CHLORIDE mmol/L 108   < > 105   CO2 mmol/L 28   < > 25   BUN mg/dL 12   < > 16   CREATININE mg/dL 0.83   < > 0.85   CALCIUM mg/dL 8.4   < > 7.9*   ALK PHOS U/L  --   --  106*   ALT U/L  --   --  6*   AST U/L  --   --  8*    < > = values in this interval not displayed. Results from last 7 days   Lab Units 09/24/23  0026   INR  1.04       * I Have Reviewed All Lab Data Listed Above. Recent Cultures (last 7 days):     Results from last 7 days   Lab Units 09/24/23  0026   BLOOD CULTURE  No Growth at 48 hrs. No Growth at 48 hrs.            Imaging: I have personally reviewed pertinent films in PACS  EKG, Pathology, and Other Studies: I have personally reviewed pertinent reports. ** Please Note: Portions of the record may have been created with voice recognition software. Occasional wrong word or "sound a like" substitutions may have occurred due to the inherent limitations of voice recognition software. Read the chart carefully and recognize, using context, where substitutions have occurred.  **

## 2023-09-26 NOTE — ASSESSMENT & PLAN NOTE
· Continue home Norvasc 5 mg daily, hydralazine 10 mg 3 times daily, losartan 100 mg daily and metoprolol tartrate 100 mg every 12 hours  · Blood pressure adequately controlled: 146/70, 134/70: Patient had a reading of 171/103:  Suspect accelerated due to stress/pain: Continue to monitor, as blood pressure has improved, if remains elevated may need titration of her antihypertensive

## 2023-09-26 NOTE — CASE MANAGEMENT
Case Management Assessment & Discharge Planning Note    Patient name Oscar Jiang  Location [facility] /E5 MS 12-* MRN [id number]  : 1960 Date 2023       Current Admission Date: 2023  Current Admission Diagnosis:Severe sepsis [facility])   Patient Active Problem List    Diagnosis Date Noted   • Cellulitis of lower extremity 2023   • Chronic anemia 2023   • Acute bacterial conjunctivitis of both eyes 2023   • Pulmonary embolism ([address]) 2023   • Diabetic foot ulcer ([address]) 2023   • Ulcer of left heel ([address]) 2023   • Homelessness 2023   • Encounter for support and coordination of transition of care 2023   • Food insecurity 2023   • Leukocytosis 2023   • Friction blisters of the soles 2023   • Calculus of gallbladder without cholecystitis without obstruction 2023   • Severe sepsis ([address]) 2023   • Diabetes mellitus ([address]) 2023   • Abnormal CT scan 2023   • Suspected pressure injury of deep tissue 03/15/2023   • Dyslipidemia 2023   • Insulin long-term use ([address]) 2023   • Type 2 diabetes mellitus with hyperglycemia ([address]) 2023   • Ambulatory dysfunction 2023   • Legally blind 2023   • Diarrhea 2023   • Bilateral lower extremity edema 2023   • Unsheltered homelessness 2023   • Abnormal urinalysis 2023   • Weakness 2023   • Intertrigo 2023   • Leg numbness 2023   • Unsteadiness on feet 2022   • Obstructive sleep apnea 2022   • Diabetic neuropathy ([address]) 2022   • Stage 3a chronic kidney disease ([address]) 2022   • Polymyalgia rheumatica ([address]) 2021   • Gait instability 2021   • Ulnar neuropathy of right upper extremity    • Blurry vision, bilateral 2021   • Depressed mood 10/08/2020   • Hypertension 05/10/2020   • Chronic migraine without aura without status migrainosus, not intractable 2020   • Hypersomnia 04/06/2020   • Cerebral aneurysm without rupture 04/06/2020   • History of absence seizures 03/25/2020   • Thyroid nodule 03/06/2020   • Aneurysm ([address]) 03/05/2020   • Type 2 diabetes mellitus with hyperglycemia, with long-term current use of insulin ([address]) 02/21/2020   • Left cavernous carotid aneurysm 02/10/2020   • Polyneuropathy associated with underlying disease ([address]) 01/07/2020   • PMR (polymyalgia rheumatica) ([address]) 01/07/2020   • Thrombocytosis 01/07/2020   • Morbid obesity ([address]) 09/19/2019   • Other inflammatory and immune myopathies, not elsewhere classified 09/16/2019   • Type 2 diabetes mellitus with microalbuminuria, with long-term current use of insulin ([address]) 02/01/2019   • Mild intermittent asthma without complication 73/89/4634   • Orthostatic hypotension 06/25/2018   • Lung nodules 03/22/2018   • Exertional dyspnea 02/13/2018   • Enlarged pulmonary artery ([address]) 02/13/2018   • Aortic dilatation ([address]) 02/13/2018   • Uncontrolled type 2 diabetes mellitus with hyperglycemia (HCC)    • Seizures ([address])    • QT prolongation 12/18/2017   • Decreased pulses in feet 12/11/2017   • Microalbuminuria 09/08/2015   • Vitamin D deficiency 06/06/2014   • Visual impairment in both eyes 12/06/2012   • Anemia 03/20/2012   • Hyperlipidemia 03/20/2012   • Neuropathy of hand, right 03/20/2012      LOS (days): 2  Geometric Mean LOS (GMLOS) (days): 3.50  Days to GMLOS:1.1     OBJECTIVE:    Risk of Unplanned Readmission Score: 40.6         Current admission status: Inpatient       Preferred Pharmacy:   [facility] [address] [place] [address], [place] - [address] [address]  Phone: [phone] Fax: [address] [place] [address] [address] [facility],  [facility],  [address] [place] [address]  Phone: [phone] Fax: [phone]    Primary Care Provider: Vivi Corbett MD    Primary Insurance: [facility]  School [facility] REP  Secondary Insurance: [facility]    ASSESSMENT:  [address], [address] [facility] Representative - Son   Primary Phone: [phone] (Mobile)  Home Phone: [phone]               Readmission Root Cause  30 Day Readmission: No    Patient Information  Admitted from[de-identified] Other (comment) (Homeless)  Mental Status: Alert  During Assessment patient was accompanied by: Son  Assessment information provided by[de-identified] Patient  Primary Caregiver: Family  Caregiver's Name[de-identified] Kylah Zamora Relationship to Patient[de-identified] Family Member  Caregiver's Telephone Number[de-identified] [phone]  Support Systems: Estefany Alfonso of Residence: [facility]  Type of Current Residence: Homeless  In the last 12 months, was there a time when you were not able to pay the mortgage or rent on time?: Yes  In the last 12 months, was there a time when you did not have a steady place to sleep or slept in a shelter (including now)?: Yes  Living Arrangements: Other (Comment) (Homeless since 02/2023 (pt's son is also homeless))    Activities of Daily Living Prior to Admission  Functional Status: Assistance  Completes ADLs independently?: No  Level of ADL dependence: Assistance  Ambulates independently?: No  Level of ambulatory dependence: Assistance  Does patient use assisted devices?: Yes  Assisted Devices (DME) used: Wheelchair  Does patient currently own DME?: Yes  What DME does the patient currently own?: Wheelchair  Does patient have a history of Outpatient Therapy (PT/OT)?: No  Does the patient have a history of Short-Term Rehab?: Yes (Post Acute Rehab (formerly [address] [facility]  Does patient have a history of HHC?: No  Does patient currently have [facility]?: No      Patient Information Continued  Income Source: SSI/SSD  Within the past 12 months, you worried that your food would run out before you got the money to buy more.: Often true  Within the past 12 months, the food you bought just didn't last and you didn't have money to get more. Carito Abarca Often true  Does patient receive dialysis treatments?: No  Does patient have a history of substance abuse?: No  Does patient have a history of Mental Health Diagnosis?: No      Means of Transportation  In the past 12 months, has lack of transportation kept you from medical appointments or from getting medications?: Yes  In the past 12 months, has lack of transportation kept you from meetings, work, or from getting things needed for daily living?: Yes    DISCHARGE DETAILS:    Discharge planning discussed with[de-identified] Patient and son     CM contacted family/caregiver?: Yes  Were Treatment Team discharge recommendations reviewed with patient/caregiver?: Yes  Did patient/caregiver verbalize understanding of patient care needs?: Yes  Were patient/caregiver advised of the risks associated with not following Treatment Team discharge recommendations?: Yes       Additional Comments: MAI m/w the pt and her son to complete an assessment and discuss discharge planning. Pt and son have been homless since 02/2023. [facility] have been sleeping at [facility]. PT/OT recommended post acute rehab. Pt is agreeable to this. Pt stated she was at [address] [facility] in [facility] once before and this would be her 1st choice. CM notified the pt that she will need to be optioned by the [facility] prior to her admission to a SNF. Pt stated an understanding.

## 2023-09-26 NOTE — ASSESSMENT & PLAN NOTE
Lab Results   Component Value Date    EGFR 74 09/26/2023    EGFR 79 09/25/2023    EGFR 72 09/24/2023    CREATININE 0.83 09/26/2023    CREATININE 0.79 09/25/2023    CREATININE 0.85 09/24/2023   · POA creatinine 1.2  · Mildly elevated from baseline of 0.8-0.9, but did not meet strict criteria for acute kidney injury  · Patient was given IV fluids and creatinine normalized

## 2023-09-26 NOTE — ASSESSMENT & PLAN NOTE
· Patient has a history of chronic diabetic foot ulcers  · Patient noted that the dressings on both of her legs had not been changed since he left short-term rehab greater than 2 weeks prior to this admission  · Patient was evaluated podiatry: Noted to have bilateral diabetic foot ulcers, clinically infected with surrounding erythema and purulent drainage  · CT scan without evidence of osteomyelitis  · Continue antibiotics with Zosyn clinically improving  · Per podiatry patient may be weightbearing as tolerated, continue offloading when nonambulatory  · Patient was reevaluated by podiatry on 9/26: Right foot plantar eschar removed, underlying wound granular/fibrotic  · Continue current antibiotics and local wound care

## 2023-09-26 NOTE — PROGRESS NOTES
[facility]  Progress Note  Name: Remedios Lowe  MRN: [id number]  Unit/Bed#: E5 -01 I Date of Admission: 9/23/2023   Date of Service: 9/26/2023 I Hospital Day: 2    Assessment/Plan   * Severe sepsis [facility])  Assessment & Plan  Pt is a 38-year-old female with past medical history significant for with PMHx of homelessness, legally blind, wheelchair bound, DM2, chronic diabetic foot wounds, CKD, and PE anticoagulation presented to the ED with complaints of bilateral lower extremity redness and swelling for the past 2 weeks    · Patient recently discharged from short term rehab about 2 weeks prior to this admission, after recent [facility] admission for COVID  · She had been living unsheltered since that time. She reports that her bandages on her feet had not been changed since she left the rehab  Patient presented to the ED fulfilling septic criteria with tachycardia and leukocytosis 16.10, and lactic acidosis  Septic source, multifactorial with bilateral lower extremity cellulitis  CT bilateral lower extremities concerning for cellulitis   CXR with no acute cardiopulmonary abnormalities  UA without evidence of infection  Blood cultures negative thus far  Lactic acid 3.7->1.5  Patient was given IV fluid resuscitation, and empiric antibiotics and admitted  She continues to improve: Afebrile, with improved leukocytosis      Cellulitis of lower extremity  Assessment & Plan  · Patient presents with bilateral lower extremity erythema  · She had a CT scan of her right lower extremity that revealed, "Right lower extremity subcutaneous edema consistent with nonspecific cellulitis without evidence of drainable abscess collection.  No soft tissue emphysema or osseous destructive change to indicate osteomyelitis"  · CT scan of left lower extremity that revealed, "Left lower extremity diffuse subcutaneous edema indicating nonspecific cellulitis without drainable abscess collection, soft tissue emphysema or osseous destructive change to indicate osteomyelitis"  · Blood cultures negative thus far  · MRSA nares swab pending  · Noted to have bilateral diabetic foot ulcers, locally infected with surrounding erythema and purulent drainage  · Continue antibiotics with Zosyn  · No history of MRSA: We will discontinue vancomycin  · Patient is clinically improved, afebrile, with improving leukocytosis    Diabetic foot ulcer ([address])  Assessment & Plan  · Patient has a history of chronic diabetic foot ulcers  · Patient noted that the dressings on both of her legs had not been changed since he left short-term rehab greater than 2 weeks prior to this admission  · Patient was evaluated podiatry: Noted to have bilateral diabetic foot ulcers, clinically infected with surrounding erythema and purulent drainage  · CT scan without evidence of osteomyelitis  · Continue antibiotics with Zosyn clinically improving  · Per podiatry patient may be weightbearing as tolerated, continue offloading when nonambulatory  · Patient was reevaluated by podiatry on 9/26: Right foot plantar eschar removed, underlying wound granular/fibrotic  · Continue current antibiotics and local wound care    Chronic anemia  Assessment & Plan  · Patient appears to have a chronic anemia over the past several years  · Baseline hemoglobin ranging 8-9  · Current hemoglobin at her baseline    Acute bacterial conjunctivitis of both eyes  Assessment & Plan  · Pt reports that she began with symptoms of eye discharge and erythema about 2-3 weeks ago, which has been progressively getting worse since that time. She reports that she tried using some eye drops she had around with no relief.   · Continue erythromycin ointment q6 hours  · Supportive care  · Improving    Pulmonary embolism [facility])  Assessment & Plan  · Last admitted 8/08-8/15 to [facility] for acute PE and lower extremity DVT  · Discharged on Eliquis, with compliance    Homelessness  Assessment & Plan  · Admitted to [facility] at the end of August and discharge to short term rehab, afterwards patient went back to living unsheltered x since that time  · Patient reports that she has been trying to get into a senior apartment, but they are all full  · She reports difficulty finding a homeless shelter that will accommodate her wheelchair so she has been living outside  · Patient reports she tries to be compliant with medications, but it is sometimes difficult secondary to her situation  · Case management consulted: Assisting with discharge planning    Ambulatory dysfunction  Assessment & Plan  · Patient notes that since leaving short-term rehab she has remained in her wheelchair and not ambulated   · She notes she has become progressively weaker due to this   · PT/OT eval appreciated: Patient considering short-term rehab    Stage 3a chronic kidney disease [facility])  Assessment & Plan  Lab Results   Component Value Date    EGFR 74 09/26/2023    EGFR 79 09/25/2023    EGFR 72 09/24/2023    CREATININE 0.83 09/26/2023    CREATININE 0.79 09/25/2023    CREATININE 0.85 09/24/2023   · POA creatinine 1.2  · Mildly elevated from baseline of 0.8-0.9, but did not meet strict criteria for acute kidney injury  · Patient was given IV fluids and creatinine normalized      Hypertension  Assessment & Plan  · Continue home Norvasc 5 mg daily, hydralazine 10 mg 3 times daily, losartan 100 mg daily and metoprolol tartrate 100 mg every 12 hours  · Blood pressure adequately controlled: 146/70, 134/70: Patient had a reading of 171/103:  Suspect accelerated due to stress/pain: Continue to monitor, as blood pressure has improved, if remains elevated may need titration of her antihypertensive    PMR (polymyalgia rheumatica) ([facility])  Assessment & Plan  · Continue home regimen of prednisone 4 mg daily    Uncontrolled type 2 diabetes mellitus with hyperglycemia [facility])  Assessment & Plan  Lab Results   Component Value Date    HGBA1C 10.2 (H) 08/24/2023 Recent Labs     09/25/23  2100 09/26/23  0702 09/26/23  1124 09/26/23  1544   POCGLU 128 123 125 191*       Blood Sugar Average: Last 72 hrs:  (P) [id number]   · Continue home insulin regimen of 18 units of Lantus q12 hours with 5 units of lispro tid with meals  · Blood sugars adequately controlled  · SSI with accucheks                Family: Updated patient's son Nyla Chua at the bedside    Discussed with patient's nurse and     VTE Pharmacologic Prophylaxis: RX contraindicated due to: Eliquis  VTE Mechanical Prophylaxis: sequential compression device        Certification Statement: The patient will continue to require additional inpatient hospital stay due to need for further acute intervention for IV antibiotics, cellulitis    Status: inpatient     ===================================================================    Subjective:  Patient notes that she feels better. Denies any current pain. Denies any pain in her feet. Notes she was ambulating today with improved strength. She denies any shortness of breath. Denies any chest pain. Denies any abdominal pain. Denies any nausea, vomiting, diarrhea. Is tolerating p.o. Physical Exam:   Temp:  [98 °F (36.7 °C)-98.4 °F (36.9 °C)] 98 °F (36.7 °C)  HR:  [61-68] 61  Resp:  [18] 18  BP: (134-171)/() 146/70    Gen:  Pleasant, non-tachypnic, non-dyspnic. Conversant. Heart: regular rate and rhythm, S1S2 present, no murmur, rub or gallop  Lungs: clear to ausculatation bilaterally. No wheezing, crackles, or rhonchi. No accessory muscle use or respiratory distress. Abd: soft, non-tender, non-distended. NABS, no guarding, rebound or peritoneal signs. Extremities: no clubbing, cyanosis. Trace bilateral pretibial edema. 2+pedal pulses bilaterally. Neuro: awake, alert. Fluent speech  Skin: warm and dry: No pretibial or lower extremity edema noted.   Bilateral foot dressing in place, not removed    LABS:   Results from last 7 days Lab Units 09/26/23 0453 09/25/23  0940 09/24/23  0830   WBC Thousand/uL 11.78* 12.80* 14.84*   HEMOGLOBIN g/dL 8.4* 8.6* 8.3*   HEMATOCRIT % 27.7* 28.8* 27.9*   PLATELETS Thousands/uL 352 337 299     Results from last 7 days   Lab Units 09/26/23 0453 09/25/23  0940 09/24/23  0830   POTASSIUM mmol/L 3.7 3.7 4.1   CHLORIDE mmol/L 108 107 105   CO2 mmol/L 28 28 25   BUN mg/dL 12 11 16   CREATININE mg/dL 0.83 0.79 0.85   CALCIUM mg/dL 8.4 8.4 7.9[facility] Data:  9/24: CT right lower extremity with contrast:  subcutaneous edema consistent with nonspecific cellulitis without evidence of drainable abscess collection. No soft tissue emphysema or osseous destructive change to indicate osteomyelitis  9/24: CT left lower extremity with contrast:  subcutaneous edema consistent with nonspecific cellulitis without evidence of drainable abscess collection. No soft tissue emphysema or osseous destructive change to indicate osteomyelitis     Microbiology:  9/24: MRSA nares culture: negative  9/24: Negative COVID, influenza, RSV  9/24: Blood culture: Negative x2          ---------------------------------------------------------------------------------------------------------------  This note has been constructed using a voice recognition system.

## 2023-09-26 NOTE — ASSESSMENT & PLAN NOTE
· Patient presents with bilateral lower extremity erythema  · She had a CT scan of her right lower extremity that revealed, "Right lower extremity subcutaneous edema consistent with nonspecific cellulitis without evidence of drainable abscess collection.  No soft tissue emphysema or osseous destructive change to indicate osteomyelitis"  · CT scan of left lower extremity that revealed, "Left lower extremity diffuse subcutaneous edema indicating nonspecific cellulitis without drainable abscess collection, soft tissue emphysema or osseous destructive change to indicate osteomyelitis"  · Blood cultures negative thus far  · MRSA nares swab pending  · Noted to have bilateral diabetic foot ulcers, locally infected with surrounding erythema and purulent drainage  · Continue antibiotics with Zosyn  · No history of MRSA: We will discontinue vancomycin  · Patient is clinically improved, afebrile, with improving leukocytosis

## 2023-09-26 NOTE — ASSESSMENT & PLAN NOTE
· Admitted to [facility] at the end of August and discharge to short term rehab, afterwards patient went back to living unsheltered x since that time  · Patient reports that she has been trying to get into a senior apartment, but they are all full  · She reports difficulty finding a homeless shelter that will accommodate her wheelchair so she has been living outside  · Patient reports she tries to be compliant with medications, but it is sometimes difficult secondary to her situation  · Case management consulted: Assisting with discharge planning

## 2023-09-27 PROBLEM — B30.9 ACUTE VIRAL CONJUNCTIVITIS OF BOTH EYES: Status: ACTIVE | Noted: 2023-09-24

## 2023-09-27 LAB
BASOPHILS # BLD AUTO: 0.07 THOUSANDS/ÂΜL (ref 0–0.1)
BASOPHILS NFR BLD AUTO: 1 % (ref 0–1)
EOSINOPHIL # BLD AUTO: 0.38 THOUSAND/ÂΜL (ref 0–0.61)
EOSINOPHIL NFR BLD AUTO: 3 % (ref 0–6)
ERYTHROCYTE [DISTWIDTH] IN BLOOD BY AUTOMATED COUNT: 16.9 % (ref 11.6–15.1)
GLUCOSE SERPL-MCNC: 165 MG/DL (ref 65–140)
GLUCOSE SERPL-MCNC: 184 MG/DL (ref 65–140)
GLUCOSE SERPL-MCNC: 205 MG/DL (ref 65–140)
GLUCOSE SERPL-MCNC: 220 MG/DL (ref 65–140)
HCT VFR BLD AUTO: 30.6 % (ref 34.8–46.1)
HGB BLD-MCNC: 8.9 G/DL (ref 11.5–15.4)
IMM GRANULOCYTES # BLD AUTO: 0.2 THOUSAND/UL (ref 0–0.2)
IMM GRANULOCYTES NFR BLD AUTO: 2 % (ref 0–2)
LYMPHOCYTES # BLD AUTO: 3.38 THOUSANDS/ÂΜL (ref 0.6–4.47)
LYMPHOCYTES NFR BLD AUTO: 28 % (ref 14–44)
MCH RBC QN AUTO: 23.2 PG (ref 26.8–34.3)
MCHC RBC AUTO-ENTMCNC: 29.1 G/DL (ref 31.4–37.4)
MCV RBC AUTO: 80 FL (ref 82–98)
MONOCYTES # BLD AUTO: 0.95 THOUSAND/ÂΜL (ref 0.17–1.22)
MONOCYTES NFR BLD AUTO: 8 % (ref 4–12)
NEUTROPHILS # BLD AUTO: 7.04 THOUSANDS/ÂΜL (ref 1.85–7.62)
NEUTS SEG NFR BLD AUTO: 58 % (ref 43–75)
NRBC BLD AUTO-RTO: 0 /100 WBCS
PLATELET # BLD AUTO: 376 THOUSANDS/UL (ref 149–390)
PMV BLD AUTO: 10.8 FL (ref 8.9–12.7)
RBC # BLD AUTO: 3.84 MILLION/UL (ref 3.81–5.12)
WBC # BLD AUTO: 12.02 THOUSAND/UL (ref 4.31–10.16)

## 2023-09-27 PROCEDURE — 99232 SBSQ HOSP IP/OBS MODERATE 35: CPT | Performed by: INTERNAL MEDICINE

## 2023-09-27 PROCEDURE — 85025 COMPLETE CBC W/AUTO DIFF WBC: CPT | Performed by: INTERNAL MEDICINE

## 2023-09-27 PROCEDURE — 82948 REAGENT STRIP/BLOOD GLUCOSE: CPT

## 2023-09-27 RX ADMIN — PIPERACILLIN SODIUM AND TAZOBACTAM SODIUM 3.38 G: 36; 4.5 INJECTION, POWDER, LYOPHILIZED, FOR SOLUTION INTRAVENOUS at 08:43

## 2023-09-27 RX ADMIN — APIXABAN 5 MG: 5 TABLET, FILM COATED ORAL at 17:19

## 2023-09-27 RX ADMIN — INSULIN LISPRO 4 UNITS: 100 INJECTION, SOLUTION INTRAVENOUS; SUBCUTANEOUS at 21:44

## 2023-09-27 RX ADMIN — HYDRALAZINE HYDROCHLORIDE 10 MG: 10 TABLET, FILM COATED ORAL at 17:19

## 2023-09-27 RX ADMIN — GABAPENTIN 100 MG: 100 CAPSULE ORAL at 08:44

## 2023-09-27 RX ADMIN — INSULIN LISPRO 5 UNITS: 100 INJECTION, SOLUTION INTRAVENOUS; SUBCUTANEOUS at 12:16

## 2023-09-27 RX ADMIN — INSULIN LISPRO 5 UNITS: 100 INJECTION, SOLUTION INTRAVENOUS; SUBCUTANEOUS at 17:19

## 2023-09-27 RX ADMIN — PREDNISONE 4 MG: 1 TABLET ORAL at 08:44

## 2023-09-27 RX ADMIN — INSULIN GLARGINE 18 UNITS: 100 INJECTION, SOLUTION SUBCUTANEOUS at 08:43

## 2023-09-27 RX ADMIN — PIPERACILLIN SODIUM AND TAZOBACTAM SODIUM 3.38 G: 36; 4.5 INJECTION, POWDER, LYOPHILIZED, FOR SOLUTION INTRAVENOUS at 19:33

## 2023-09-27 RX ADMIN — PIPERACILLIN SODIUM AND TAZOBACTAM SODIUM 3.38 G: 36; 4.5 INJECTION, POWDER, LYOPHILIZED, FOR SOLUTION INTRAVENOUS at 14:27

## 2023-09-27 RX ADMIN — INSULIN LISPRO 4 UNITS: 100 INJECTION, SOLUTION INTRAVENOUS; SUBCUTANEOUS at 12:16

## 2023-09-27 RX ADMIN — INSULIN LISPRO 5 UNITS: 100 INJECTION, SOLUTION INTRAVENOUS; SUBCUTANEOUS at 08:45

## 2023-09-27 RX ADMIN — METOPROLOL TARTRATE 100 MG: 100 TABLET, FILM COATED ORAL at 08:44

## 2023-09-27 RX ADMIN — TOPIRAMATE 100 MG: 100 TABLET, FILM COATED ORAL at 21:44

## 2023-09-27 RX ADMIN — ERYTHROMYCIN 0.5 INCH: 5 OINTMENT OPHTHALMIC at 12:17

## 2023-09-27 RX ADMIN — NYSTATIN: 100000 POWDER TOPICAL at 08:43

## 2023-09-27 RX ADMIN — FLUTICASONE FUROATE AND VILANTEROL TRIFENATATE 1 PUFF: 200; 25 POWDER RESPIRATORY (INHALATION) at 08:45

## 2023-09-27 RX ADMIN — SODIUM CHLORIDE, SODIUM GLUCONATE, SODIUM ACETATE, POTASSIUM CHLORIDE, MAGNESIUM CHLORIDE, SODIUM PHOSPHATE, DIBASIC, AND POTASSIUM PHOSPHATE 100 ML/HR: .53; .5; .37; .037; .03; .012; .00082 INJECTION, SOLUTION INTRAVENOUS at 03:12

## 2023-09-27 RX ADMIN — NYSTATIN: 100000 POWDER TOPICAL at 17:19

## 2023-09-27 RX ADMIN — ATORVASTATIN CALCIUM 40 MG: 40 TABLET, FILM COATED ORAL at 08:44

## 2023-09-27 RX ADMIN — ERYTHROMYCIN 0.5 INCH: 5 OINTMENT OPHTHALMIC at 17:19

## 2023-09-27 RX ADMIN — AMLODIPINE BESYLATE 5 MG: 5 TABLET ORAL at 08:44

## 2023-09-27 RX ADMIN — INSULIN LISPRO 2 UNITS: 100 INJECTION, SOLUTION INTRAVENOUS; SUBCUTANEOUS at 17:19

## 2023-09-27 RX ADMIN — INSULIN LISPRO 2 UNITS: 100 INJECTION, SOLUTION INTRAVENOUS; SUBCUTANEOUS at 08:45

## 2023-09-27 RX ADMIN — HYDRALAZINE HYDROCHLORIDE 10 MG: 10 TABLET, FILM COATED ORAL at 08:44

## 2023-09-27 RX ADMIN — GABAPENTIN 100 MG: 100 CAPSULE ORAL at 17:19

## 2023-09-27 RX ADMIN — APIXABAN 5 MG: 5 TABLET, FILM COATED ORAL at 08:44

## 2023-09-27 RX ADMIN — LOSARTAN POTASSIUM 100 MG: 50 TABLET, FILM COATED ORAL at 08:44

## 2023-09-27 RX ADMIN — PIPERACILLIN SODIUM AND TAZOBACTAM SODIUM 3.38 G: 36; 4.5 INJECTION, POWDER, LYOPHILIZED, FOR SOLUTION INTRAVENOUS at 03:12

## 2023-09-27 RX ADMIN — ERYTHROMYCIN 0.5 INCH: 5 OINTMENT OPHTHALMIC at 23:19

## 2023-09-27 RX ADMIN — LIDOCAINE 1 PATCH: 700 PATCH TOPICAL at 08:44

## 2023-09-27 RX ADMIN — ERYTHROMYCIN 0.5 INCH: 5 OINTMENT OPHTHALMIC at 06:44

## 2023-09-27 RX ADMIN — METOPROLOL TARTRATE 100 MG: 100 TABLET, FILM COATED ORAL at 21:56

## 2023-09-27 RX ADMIN — HYDRALAZINE HYDROCHLORIDE 10 MG: 10 TABLET, FILM COATED ORAL at 21:56

## 2023-09-27 RX ADMIN — INSULIN GLARGINE 18 UNITS: 100 INJECTION, SOLUTION SUBCUTANEOUS at 21:44

## 2023-09-27 NOTE — PLAN OF CARE
Problem: MOBILITY - ADULT  Goal: Maintain or return to baseline ADL function  Description: INTERVENTIONS:  -  Assess patient's ability to carry out ADLs; assess patient's baseline for ADL function and identify physical deficits which impact ability to perform ADLs (bathing, care of mouth/teeth, toileting, grooming, dressing, etc.)  - Assess/evaluate cause of self-care deficits   - Assess range of motion  - Assess patient's mobility; develop plan if impaired  - Assess patient's need for assistive devices and provide as appropriate  - Encourage maximum independence but intervene and supervise when necessary  - Involve family in performance of ADLs  - Assess for home care needs following discharge   - Consider OT consult to assist with ADL evaluation and planning for discharge  - Provide patient education as appropriate  Outcome: Progressing  Goal: Maintains/Returns to pre admission functional level  Description: INTERVENTIONS:  - Perform BMAT or MOVE assessment daily.   - Set and communicate daily mobility goal to care team and patient/family/caregiver. - Collaborate with rehabilitation services on mobility goals if consulted  - Perform Range of Motion 3 times a day. - Reposition patient every 2 hours.   - Dangle patient 3 times a day  - Stand patient 3 times a day  - Ambulate patient 3 times a day  - Out of bed to chair 3 times a day   - Out of bed for meals 3 times a day  - Out of bed for toileting  - Record patient progress and toleration of activity level   Outcome: Progressing     Problem: PAIN - ADULT  Goal: Verbalizes/displays adequate comfort level or baseline comfort level  Description: Interventions:  - Encourage patient to monitor pain and request assistance  - Assess pain using appropriate pain scale  - Administer analgesics based on type and severity of pain and evaluate response  - Implement non-pharmacological measures as appropriate and evaluate response  - Consider cultural and social influences on pain and pain management  - Notify physician/advanced practitioner if interventions unsuccessful or patient reports new pain  Outcome: Progressing     Problem: INFECTION - ADULT  Goal: Absence or prevention of progression during hospitalization  Description: INTERVENTIONS:  - Assess and monitor for signs and symptoms of infection  - Monitor lab/diagnostic results  - Monitor all insertion sites, i.e. indwelling lines, tubes, and drains  - Monitor endotracheal if appropriate and nasal secretions for changes in amount and color  - [place] appropriate cooling/warming therapies per order  - Administer medications as ordered  - Instruct and encourage patient and family to use good hand hygiene technique  - Identify and instruct in appropriate isolation precautions for identified infection/condition  Outcome: Progressing     Problem: SAFETY ADULT  Goal: Maintain or return to baseline ADL function  Description: INTERVENTIONS:  -  Assess patient's ability to carry out ADLs; assess patient's baseline for ADL function and identify physical deficits which impact ability to perform ADLs (bathing, care of mouth/teeth, toileting, grooming, dressing, etc.)  - Assess/evaluate cause of self-care deficits   - Assess range of motion  - Assess patient's mobility; develop plan if impaired  - Assess patient's need for assistive devices and provide as appropriate  - Encourage maximum independence but intervene and supervise when necessary  - Involve family in performance of ADLs  - Assess for home care needs following discharge   - Consider OT consult to assist with ADL evaluation and planning for discharge  - Provide patient education as appropriate  Outcome: Progressing  Goal: Maintains/Returns to pre admission functional level  Description: INTERVENTIONS:  - Perform BMAT or MOVE assessment daily.   - Set and communicate daily mobility goal to care team and patient/family/caregiver.    - Collaborate with rehabilitation services on mobility goals if consulted  - Perform Range of Motion 3 times a day. - Reposition patient every 2 hours.   - Dangle patient 3 times a day  - Stand patient 3 times a day  - Ambulate patient 3 times a day  - Out of bed to chair 3 times a day   - Out of bed for meals 3 times a day  - Out of bed for toileting  - Record patient progress and toleration of activity level   Outcome: Progressing  Goal: Patient will remain free of falls  Description: INTERVENTIONS:  - Educate patient/family on patient safety including physical limitations  - Instruct patient to call for assistance with activity   - Consult OT/PT to assist with strengthening/mobility   - Keep Call bell within reach  - Keep bed low and locked with side rails adjusted as appropriate  - Keep care items and personal belongings within reach  - Initiate and maintain comfort rounds  - Make Fall Risk Sign visible to staff  - Offer Toileting every 2 Hours, in advance of need  - Initiate/Maintain bed alarm  - Obtain necessary fall risk management equipment: alarm  - Apply yellow socks and bracelet for high fall risk patients  - Consider moving patient to room near nurses station  Outcome: Progressing     Problem: DISCHARGE PLANNING  Goal: Discharge to home or other facility with appropriate resources  Description: INTERVENTIONS:  - Identify barriers to discharge w/patient and caregiver  - Arrange for needed discharge resources and transportation as appropriate  - Identify discharge learning needs (meds, wound care, etc.)  - Arrange for interpretive services to assist at discharge as needed  - Refer to Case Management Department for coordinating discharge planning if the patient needs post-hospital services based on physician/advanced practitioner order or complex needs related to functional status, cognitive ability, or social support system  Outcome: Progressing     Problem: Knowledge Deficit  Goal: Patient/family/caregiver demonstrates understanding of disease process, treatment plan, medications, and discharge instructions  Description: Complete learning assessment and assess knowledge base.   Interventions:  - Provide teaching at level of understanding  - Provide teaching via preferred learning methods  Outcome: Progressing     Problem: CARDIOVASCULAR - ADULT  Goal: Maintains optimal cardiac output and hemodynamic stability  Description: INTERVENTIONS:  - Monitor I/O, vital signs and rhythm  - Monitor for S/S and trends of decreased cardiac output  - Administer and titrate ordered vasoactive medications to optimize hemodynamic stability  - Assess quality of pulses, skin color and temperature  - Assess for signs of decreased coronary artery perfusion  - Instruct patient to report change in severity of symptoms  Outcome: Progressing  Goal: Absence of cardiac dysrhythmias or at baseline rhythm  Description: INTERVENTIONS:  - Continuous cardiac monitoring, vital signs, obtain 12 lead EKG if ordered  - Administer antiarrhythmic and heart rate control medications as ordered  - Monitor electrolytes and administer replacement therapy as ordered  Outcome: Progressing     Problem: RESPIRATORY - ADULT  Goal: Achieves optimal ventilation and oxygenation  Description: INTERVENTIONS:  - Assess for changes in respiratory status  - Assess for changes in mentation and behavior  - Position to facilitate oxygenation and minimize respiratory effort  - Oxygen administered by appropriate delivery if ordered  - Initiate smoking cessation education as indicated  - Encourage broncho-pulmonary hygiene including cough, deep breathe, Incentive Spirometry  - Assess the need for suctioning and aspirate as needed  - Assess and instruct to report SOB or any respiratory difficulty  - Respiratory Therapy support as indicated  Outcome: Progressing     Problem: METABOLIC, FLUID AND ELECTROLYTES - ADULT  Goal: Glucose maintained within target range  Description: INTERVENTIONS:  - Monitor Blood Glucose as ordered  - Assess for signs and symptoms of hyperglycemia and hypoglycemia  - Administer ordered medications to maintain glucose within target range  - Assess nutritional intake and initiate nutrition service referral as needed  Outcome: Progressing     Problem: SKIN/TISSUE INTEGRITY - ADULT  Goal: Incision(s), wounds(s) or drain site(s) healing without S/S of infection  Description: INTERVENTIONS  - Assess and document dressing, incision, wound bed, drain sites and surrounding tissue  - Provide patient and family education  - Perform skin care/dressing changes every day  Outcome: Progressing     Problem: Prexisting or High Potential for Compromised Skin Integrity  Goal: Skin integrity is maintained or improved  Description: INTERVENTIONS:  - Identify patients at risk for skin breakdown  - Assess and monitor skin integrity  - Assess and monitor nutrition and hydration status  - Monitor labs   - Assess for incontinence   - Turn and reposition patient  - Assist with mobility/ambulation  - Relieve pressure over bony prominences  - Avoid friction and shearing  - Provide appropriate hygiene as needed including keeping skin clean and dry  - Evaluate need for skin moisturizer/barrier cream  - Collaborate with interdisciplinary team   - Patient/family teaching  - Consider wound care consult   Outcome: Progressing     Problem: Nutrition/Hydration-ADULT  Goal: Nutrient/Hydration intake appropriate for improving, restoring or maintaining nutritional needs  Description: Monitor and assess patient's nutrition/hydration status for malnutrition. Collaborate with interdisciplinary team and initiate plan and interventions as ordered. Monitor patient's weight and dietary intake as ordered or per policy. Utilize nutrition screening tool and intervene as necessary. Determine patient's food preferences and provide high-protein, high-caloric foods as appropriate.      INTERVENTIONS:  - Monitor oral intake, urinary output, labs, and treatment plans  - Assess nutrition and hydration status and recommend course of action  - Evaluate amount of meals eaten  - Assist patient with eating if necessary   - Allow adequate time for meals  - Recommend/ encourage appropriate diets, oral nutritional supplements, and vitamin/mineral supplements  - Order, calculate, and assess calorie counts as needed  - Recommend, monitor, and adjust tube feedings  based on assessed needs  - Assess need for intravenous fluids  - Provide  nutrition/hydration education as appropriate  - Include patient/family/caregiver in decisions related to nutrition  Outcome: Progressing

## 2023-09-27 NOTE — ASSESSMENT & PLAN NOTE
· Patient notes that since leaving short-term rehab she has remained in her wheelchair and not ambulated   · She notes she has become progressively weaker due to this   · PT/OT lashay appreciated:   · Patient will be discharged to short-term rehab: Case management coordinating

## 2023-09-27 NOTE — ASSESSMENT & PLAN NOTE
· Last admitted 8/08-8/15 to [facility] for acute PE and lower extremity DVT  · Discharged on Eliquis, with compliance

## 2023-09-27 NOTE — ASSESSMENT & PLAN NOTE
· Continue home Norvasc 5 mg daily, hydralazine 10 mg 3 times daily, losartan 100 mg daily and metoprolol tartrate 100 mg every 12 hours  · Blood pressure is variable, 146/70, 179/89, 135/68, 179/84, 155/60  · Suspect accelerated with pain, stress and movement  · We will current continue current regimen to avoid hypotension

## 2023-09-27 NOTE — ASSESSMENT & PLAN NOTE
· Pt reports that she began with symptoms of eye discharge and erythema about 2-3 weeks ago, which has been progressively getting worse since that time. She reports that she tried using some eye drops she had around with no relief.   · Started on topical antibiotics with erythromycin, however suspect this is more likely a viral process  · No significant erythema or discharge  · Continue supportive measures and will discontinue antibiotics

## 2023-09-27 NOTE — ASSESSMENT & PLAN NOTE
· Patient presents with bilateral lower extremity erythema  · She had a CT scan of her right lower extremity that revealed, "Right lower extremity subcutaneous edema consistent with nonspecific cellulitis without evidence of drainable abscess collection.  No soft tissue emphysema or osseous destructive change to indicate osteomyelitis"  · CT scan of left lower extremity that revealed, "Left lower extremity diffuse subcutaneous edema indicating nonspecific cellulitis without drainable abscess collection, soft tissue emphysema or osseous destructive change to indicate osteomyelitis"  · Blood cultures negative thus far  · MRSA nares swab pending  · Noted to have bilateral diabetic foot ulcers, locally infected with surrounding erythema and purulent drainage  · Continue antibiotics with Zosyn  · No history of MRSA: We will discontinue vancomycin  · Patient is clinically improved, afebrile, with improving leukocytosis  · Anticipate transitioning to oral antibiotics in the near future

## 2023-09-27 NOTE — ASSESSMENT & PLAN NOTE
· Patient has a history of chronic diabetic foot ulcers  · Patient noted that the dressings on both of her legs had not been changed since he left short-term rehab greater than 2 weeks prior to this admission  · Patient was evaluated podiatry: Noted to have bilateral diabetic foot ulcers, clinically infected with surrounding erythema and purulent drainage  · CT scan without evidence of osteomyelitis  · Continue antibiotics with Zosyn clinically improving  · Per podiatry patient may be weightbearing as tolerated, continue offloading when nonambulatory  · Patient was reevaluated by podiatry on 9/26: Right foot plantar eschar removed, underlying wound granular/fibrotic  · Continue current antibiotics and local wound care  · Patient clinically and symptomatically improved

## 2023-09-27 NOTE — PROGRESS NOTES
[facility]  Progress Note  Name: Morro Wiggins  MRN: [id number]  Unit/Bed#: E5 -01 I Date of Admission: 9/23/2023   Date of Service: 9/27/2023 I Hospital Day: 3    Assessment/Plan   * Severe sepsis [facility])  Assessment & Plan  Pt is a 15-year-old female with past medical history significant for with PMHx of homelessness, legally blind, wheelchair bound, DM2, chronic diabetic foot wounds, CKD, and PE anticoagulation presented to the ED with complaints of bilateral lower extremity redness and swelling for the past 2 weeks    · Patient recently discharged from short term rehab about 2 weeks prior to this admission, after recent [facility] admission for COVID  · She had been living unsheltered since that time. She reports that her bandages on her feet had not been changed since she left the rehab  Patient presented to the ED fulfilling septic criteria with tachycardia and leukocytosis 16.10, and lactic acidosis  Septic source: Bilateral lower extremity cellulitis, and bilateral foot wounds with superimposed infection  CT bilateral lower extremities concerning for cellulitis   CXR with no acute cardiopulmonary abnormalities  UA without evidence of infection  Blood cultures negative thus far  Lactic acid 3.7->1.5  Patient was given IV fluid resuscitation, and empiric antibiotics and admitted  She continues to improve: Afebrile, with improved leukocytosis      Cellulitis of lower extremity  Assessment & Plan  · Patient presents with bilateral lower extremity erythema  · She had a CT scan of her right lower extremity that revealed, "Right lower extremity subcutaneous edema consistent with nonspecific cellulitis without evidence of drainable abscess collection.  No soft tissue emphysema or osseous destructive change to indicate osteomyelitis"  · CT scan of left lower extremity that revealed, "Left lower extremity diffuse subcutaneous edema indicating nonspecific cellulitis without drainable abscess collection, soft tissue emphysema or osseous destructive change to indicate osteomyelitis"  · Blood cultures negative thus far  · MRSA nares swab pending  · Noted to have bilateral diabetic foot ulcers, locally infected with surrounding erythema and purulent drainage  · Continue antibiotics with Zosyn  · No history of MRSA: We will discontinue vancomycin  · Patient is clinically improved, afebrile, with improving leukocytosis  · Anticipate transitioning to oral antibiotics in the near future    Diabetic foot ulcer ([address])  Assessment & Plan  · Patient has a history of chronic diabetic foot ulcers  · Patient noted that the dressings on both of her legs had not been changed since he left short-term rehab greater than 2 weeks prior to this admission  · Patient was evaluated podiatry: Noted to have bilateral diabetic foot ulcers, clinically infected with surrounding erythema and purulent drainage  · CT scan without evidence of osteomyelitis  · Continue antibiotics with Zosyn clinically improving  · Per podiatry patient may be weightbearing as tolerated, continue offloading when nonambulatory  · Patient was reevaluated by podiatry on 9/26: Right foot plantar eschar removed, underlying wound granular/fibrotic  · Continue current antibiotics and local wound care  · Patient clinically and symptomatically improved    Chronic anemia  Assessment & Plan  · Patient appears to have a chronic anemia over the past several years  · Baseline hemoglobin ranging 8-9  · Current hemoglobin at her baseline    Acute viral conjunctivitis of both eyes  Assessment & Plan  · Pt reports that she began with symptoms of eye discharge and erythema about 2-3 weeks ago, which has been progressively getting worse since that time. She reports that she tried using some eye drops she had around with no relief.   · Started on topical antibiotics with erythromycin, however suspect this is more likely a viral process  · No significant erythema or discharge  · Continue supportive measures and will discontinue antibiotics    Pulmonary embolism [facility])  Assessment & Plan  · Last admitted 8/08-8/15 to [facility] for acute PE and lower extremity DVT  · Discharged on Eliquis, with compliance    Homelessness  Assessment & Plan  · Admitted to [facility] at the end of August and discharge to short term rehab, afterwards patient went back to living unsheltered x since that time  · Patient reports that she has been trying to get into a senior apartment, but they are all full  · She reports difficulty finding a homeless shelter that will accommodate her wheelchair so she has been living outside  · Patient reports she tries to be compliant with medications, but it is sometimes difficult secondary to her situation  · Case management consulted: Assisting with discharge planning    Ambulatory dysfunction  Assessment & Plan  · Patient notes that since leaving short-term rehab she has remained in her wheelchair and not ambulated   · She notes she has become progressively weaker due to this   · PT/OT eval appreciated:   · Patient will be discharged to short-term rehab: Case management coordinating    Stage 3a chronic kidney disease [facility])  Assessment & Plan  Lab Results   Component Value Date    EGFR 74 09/26/2023    EGFR 79 09/25/2023    EGFR 72 09/24/2023    CREATININE 0.83 09/26/2023    CREATININE 0.79 09/25/2023    CREATININE 0.85 09/24/2023   · POA creatinine 1.2  · Mildly elevated from baseline of 0.8-0.9, but did not meet strict criteria for acute kidney injury  · Patient was given IV fluids and creatinine normalized      Hypertension  Assessment & Plan  · Continue home Norvasc 5 mg daily, hydralazine 10 mg 3 times daily, losartan 100 mg daily and metoprolol tartrate 100 mg every 12 hours  · Blood pressure is variable, 146/70, 179/89, 135/68, 179/84, 155/60  · Suspect accelerated with pain, stress and movement  · We will current continue current regimen to avoid hypotension    PMR (polymyalgia rheumatica) ([facility])  Assessment & Plan  · Continue home regimen of prednisone 4 mg daily    Uncontrolled type 2 diabetes mellitus with hyperglycemia [facility])  Assessment & Plan  Lab Results   Component Value Date    HGBA1C 10.2 (H) 08/24/2023       Recent Labs     09/26/23  1544 09/26/23  2102 09/27/23  0720 09/27/23  1112   POCGLU 191* 190* 165* 205*       Blood Sugar Average: Last 72 hrs:  (P) [id number]   · Continue home insulin regimen of 18 units of Lantus q12 hours with 5 units of lispro tid with meals  · Blood sugars adequately controlled  · SSI with accucheks                  Family: Updated patient's son Nyla Chua at the bedside    VTE Pharmacologic Prophylaxis: RX contraindicated due to: Eliquis  VTE Mechanical Prophylaxis: sequential compression device        Certification Statement: The patient will continue to require additional inpatient hospital stay due to need for further acute intervention for cellulitis, IV antibiotic    Status: inpatient     ===================================================================    Subjective:  Patient notes she feels improved today. She states she is moving, and repositioning in the bed with less pain. She has not been ambulated yet today and has been using the bedpan. She denies any abdominal pain, nausea, vomiting, diarrhea. Is passing urine without any difficulties. She denies any shortness of breath or cough. Denies any pain anywhere else. Denies any dizziness or lightheadedness. Is tolerating p.o. Physical Exam:   Temp:  [98.2 °F (36.8 °C)-99 °F (37.2 °C)] 98.4 °F (36.9 °C)  HR:  [61-75] 68  Resp:  [16-18] 16  BP: (135-179)/(60-89) 155/60    Gen:  Pleasant, non-tachypnic, non-dyspnic. Conversant. Heart: regular rate and rhythm, S1S2 present, no murmur, rub or gallop  Lungs: clear to ausculatation bilaterally. No wheezing, crackles, or rhonchi. No accessory muscle use or respiratory distress.   Abd: soft, non-tender, non-distended. NABS, no guarding, rebound or peritoneal signs. Extremities: no clubbing, cyanosis. No pretibial edema. Trace pedal edema. .  2+pedal pulses bilaterally. Neuro: awake, alert. Fluent speech. Interactive  Skin: warm and dry: no petechiae, purpura and rash. Bilateral foot dressing in place    LABS:   Results from last 7 days   Lab Units 09/27/23  0613 09/26/23 0453 09/25/23  0940   WBC Thousand/uL 12.02* 11.78* 12.80*   HEMOGLOBIN g/dL 8.9* 8.4* 8.6*   HEMATOCRIT % 30.6* 27.7* 28.8*   PLATELETS Thousands/uL 376 352 337     Results from last 7 days   Lab Units 09/26/23 0453 09/25/23  0940 09/24/23  0830   POTASSIUM mmol/L 3.7 3.7 4.1   CHLORIDE mmol/L 108 107 105   CO2 mmol/L 28 28 25   BUN mg/dL 12 11 16   CREATININE mg/dL 0.83 0.79 0.85   CALCIUM mg/dL 8.4 8.4 7.9[facility] Data:    9/24: CT right lower extremity with contrast:  subcutaneous edema consistent with nonspecific cellulitis without evidence of drainable abscess collection. No soft tissue emphysema or osseous destructive change to indicate osteomyelitis  9/24: CT left lower extremity with contrast:  subcutaneous edema consistent with nonspecific cellulitis without evidence of drainable abscess collection. No soft tissue emphysema or osseous destructive change to indicate osteomyelitis     Microbiology:  9/24: MRSA nares culture: negative  9/24: Negative COVID, influenza, RSV  9/24: Blood culture: Negative x2        ---------------------------------------------------------------------------------------------------------------  This note has been constructed using a voice recognition system.

## 2023-09-27 NOTE — ASSESSMENT & PLAN NOTE
· Admitted to [facility] at the end of August and discharge to short term rehab, afterwards patient went back to living unsheltered x since that time  · Patient reports that she has been trying to get into a senior apartment, but they are all full  · She reports difficulty finding a homeless shelter that will accommodate her wheelchair so she has been living outside  · Patient reports she tries to be compliant with medications, but it is sometimes difficult secondary to her situation  · Case management consulted: Assisting with discharge planning

## 2023-09-27 NOTE — ASSESSMENT & PLAN NOTE
Lab Results   Component Value Date    HGBA1C 10.2 (H) 08/24/2023       Recent Labs     09/26/23  1544 09/26/23  2102 09/27/23  0720 09/27/23  1112   POCGLU 191* 190* 165* 205*       Blood Sugar Average: Last 72 hrs:  (P) [id number]   · Continue home insulin regimen of 18 units of Lantus q12 hours with 5 units of lispro tid with meals  · Blood sugars adequately controlled  · SSI with accucheks

## 2023-09-27 NOTE — ASSESSMENT & PLAN NOTE
Pt is a 27-year-old female with past medical history significant for with PMHx of homelessness, legally blind, wheelchair bound, DM2, chronic diabetic foot wounds, CKD, and PE anticoagulation presented to the ED with complaints of bilateral lower extremity redness and swelling for the past 2 weeks    · Patient recently discharged from short term rehab about 2 weeks prior to this admission, after recent [facility] admission for COVID  · She had been living unsheltered since that time. She reports that her bandages on her feet had not been changed since she left the rehab  Patient presented to the ED fulfilling septic criteria with tachycardia and leukocytosis 16.10, and lactic acidosis  Septic source: Bilateral lower extremity cellulitis, and bilateral foot wounds with superimposed infection  CT bilateral lower extremities concerning for cellulitis   CXR with no acute cardiopulmonary abnormalities  UA without evidence of infection  Blood cultures negative thus far  Lactic acid 3.7->1.5  Patient was given IV fluid resuscitation, and empiric antibiotics and admitted  She continues to improve:  Afebrile, with improved leukocytosis

## 2023-09-27 NOTE — PLAN OF CARE
Problem: MOBILITY - ADULT  Goal: Maintain or return to baseline ADL function  Description: INTERVENTIONS:  -  Assess patient's ability to carry out ADLs; assess patient's baseline for ADL function and identify physical deficits which impact ability to perform ADLs (bathing, care of mouth/teeth, toileting, grooming, dressing, etc.)  - Assess/evaluate cause of self-care deficits   - Assess range of motion  - Assess patient's mobility; develop plan if impaired  - Assess patient's need for assistive devices and provide as appropriate  - Encourage maximum independence but intervene and supervise when necessary  - Involve family in performance of ADLs  - Assess for home care needs following discharge   - Consider OT consult to assist with ADL evaluation and planning for discharge  - Provide patient education as appropriate  Outcome: Progressing  Goal: Maintains/Returns to pre admission functional level  Description: INTERVENTIONS:  - Perform BMAT or MOVE assessment daily.   - Set and communicate daily mobility goal to care team and patient/family/caregiver.    - Collaborate with rehabilitation services on mobility goals if consulted  - Out of bed for toileting  - Record patient progress and toleration of activity level   Outcome: Progressing     Problem: PAIN - ADULT  Goal: Verbalizes/displays adequate comfort level or baseline comfort level  Description: Interventions:  - Encourage patient to monitor pain and request assistance  - Assess pain using appropriate pain scale  - Administer analgesics based on type and severity of pain and evaluate response  - Implement non-pharmacological measures as appropriate and evaluate response  - Consider cultural and social influences on pain and pain management  - Notify physician/advanced practitioner if interventions unsuccessful or patient reports new pain  Outcome: Progressing     Problem: INFECTION - ADULT  Goal: Absence or prevention of progression during hospitalization  Description: INTERVENTIONS:  - Assess and monitor for signs and symptoms of infection  - Monitor lab/diagnostic results  - Monitor all insertion sites, i.e. indwelling lines, tubes, and drains  - Monitor endotracheal if appropriate and nasal secretions for changes in amount and color  - [place] appropriate cooling/warming therapies per order  - Administer medications as ordered  - Instruct and encourage patient and family to use good hand hygiene technique  - Identify and instruct in appropriate isolation precautions for identified infection/condition  Outcome: Progressing     Problem: SAFETY ADULT  Goal: Maintain or return to baseline ADL function  Description: INTERVENTIONS:  -  Assess patient's ability to carry out ADLs; assess patient's baseline for ADL function and identify physical deficits which impact ability to perform ADLs (bathing, care of mouth/teeth, toileting, grooming, dressing, etc.)  - Assess/evaluate cause of self-care deficits   - Assess range of motion  - Assess patient's mobility; develop plan if impaired  - Assess patient's need for assistive devices and provide as appropriate  - Encourage maximum independence but intervene and supervise when necessary  - Involve family in performance of ADLs  - Assess for home care needs following discharge   - Consider OT consult to assist with ADL evaluation and planning for discharge  - Provide patient education as appropriate  Outcome: Progressing  Goal: Maintains/Returns to pre admission functional level  Description: INTERVENTIONS:  - Perform BMAT or MOVE assessment daily.   - Set and communicate daily mobility goal to care team and patient/family/caregiver.    - Collaborate with rehabilitation services on mobility goals if consulted  - Out of bed for toileting  - Record patient progress and toleration of activity level   Outcome: Progressing  Goal: Patient will remain free of falls  Description: INTERVENTIONS:  - Educate patient/family on patient safety including physical limitations  - Instruct patient to call for assistance with activity   - Consult OT/PT to assist with strengthening/mobility   - Keep Call bell within reach  - Keep bed low and locked with side rails adjusted as appropriate  - Keep care items and personal belongings within reach  - Initiate and maintain comfort rounds  - Make Fall Risk Sign visible to staff  - Apply yellow socks and bracelet for high fall risk patients  - Consider moving patient to room near nurses station  Outcome: Progressing     Problem: DISCHARGE PLANNING  Goal: Discharge to home or other facility with appropriate resources  Description: INTERVENTIONS:  - Identify barriers to discharge w/patient and caregiver  - Arrange for needed discharge resources and transportation as appropriate  - Identify discharge learning needs (meds, wound care, etc.)  - Arrange for interpretive services to assist at discharge as needed  - Refer to Case Management Department for coordinating discharge planning if the patient needs post-hospital services based on physician/advanced practitioner order or complex needs related to functional status, cognitive ability, or social support system  Outcome: Progressing     Problem: Knowledge Deficit  Goal: Patient/family/caregiver demonstrates understanding of disease process, treatment plan, medications, and discharge instructions  Description: Complete learning assessment and assess knowledge base.   Interventions:  - Provide teaching at level of understanding  - Provide teaching via preferred learning methods  Outcome: Progressing     Problem: CARDIOVASCULAR - ADULT  Goal: Maintains optimal cardiac output and hemodynamic stability  Description: INTERVENTIONS:  - Monitor I/O, vital signs and rhythm  - Monitor for S/S and trends of decreased cardiac output  - Administer and titrate ordered vasoactive medications to optimize hemodynamic stability  - Assess quality of pulses, skin color and temperature  - Assess for signs of decreased coronary artery perfusion  - Instruct patient to report change in severity of symptoms  Outcome: Progressing  Goal: Absence of cardiac dysrhythmias or at baseline rhythm  Description: INTERVENTIONS:  - Continuous cardiac monitoring, vital signs, obtain 12 lead EKG if ordered  - Administer antiarrhythmic and heart rate control medications as ordered  - Monitor electrolytes and administer replacement therapy as ordered  Outcome: Progressing     Problem: RESPIRATORY - ADULT  Goal: Achieves optimal ventilation and oxygenation  Description: INTERVENTIONS:  - Assess for changes in respiratory status  - Assess for changes in mentation and behavior  - Position to facilitate oxygenation and minimize respiratory effort  - Oxygen administered by appropriate delivery if ordered  - Initiate smoking cessation education as indicated  - Encourage broncho-pulmonary hygiene including cough, deep breathe, Incentive Spirometry  - Assess the need for suctioning and aspirate as needed  - Assess and instruct to report SOB or any respiratory difficulty  - Respiratory Therapy support as indicated  Outcome: Progressing     Problem: METABOLIC, FLUID AND ELECTROLYTES - ADULT  Goal: Glucose maintained within target range  Description: INTERVENTIONS:  - Monitor Blood Glucose as ordered  - Assess for signs and symptoms of hyperglycemia and hypoglycemia  - Administer ordered medications to maintain glucose within target range  - Assess nutritional intake and initiate nutrition service referral as needed  Outcome: Progressing     Problem: SKIN/TISSUE INTEGRITY - ADULT  Goal: Incision(s), wounds(s) or drain site(s) healing without S/S of infection  Description: INTERVENTIONS  - Assess and document dressing, incision, wound bed, drain sites and surrounding tissue  - Provide patient and family education  Outcome: Progressing     Problem: Prexisting or High Potential for Compromised Skin Integrity  Goal: Skin integrity is maintained or improved  Description: INTERVENTIONS:  - Identify patients at risk for skin breakdown  - Assess and monitor skin integrity  - Assess and monitor nutrition and hydration status  - Monitor labs   - Assess for incontinence   - Turn and reposition patient  - Assist with mobility/ambulation  - Relieve pressure over bony prominences  - Avoid friction and shearing  - Provide appropriate hygiene as needed including keeping skin clean and dry  - Evaluate need for skin moisturizer/barrier cream  - Collaborate with interdisciplinary team   - Patient/family teaching  - Consider wound care consult   Outcome: Progressing     Problem: Nutrition/Hydration-ADULT  Goal: Nutrient/Hydration intake appropriate for improving, restoring or maintaining nutritional needs  Description: Monitor and assess patient's nutrition/hydration status for malnutrition. Collaborate with interdisciplinary team and initiate plan and interventions as ordered. Monitor patient's weight and dietary intake as ordered or per policy. Utilize nutrition screening tool and intervene as necessary. Determine patient's food preferences and provide high-protein, high-caloric foods as appropriate.      INTERVENTIONS:  - Monitor oral intake, urinary output, labs, and treatment plans  - Assess nutrition and hydration status and recommend course of action  - Evaluate amount of meals eaten  - Assist patient with eating if necessary   - Allow adequate time for meals  - Recommend/ encourage appropriate diets, oral nutritional supplements, and vitamin/mineral supplements  - Order, calculate, and assess calorie counts as needed  - Recommend, monitor, and adjust tube feedings  based on assessed needs  - Assess need for intravenous fluids  - Provide  nutrition/hydration education as appropriate  - Include patient/family/caregiver in decisions related to nutrition  Outcome: Progressing

## 2023-09-28 LAB
ANION GAP SERPL CALCULATED.3IONS-SCNC: 6 MMOL/L
BASOPHILS # BLD AUTO: 0.07 THOUSANDS/ÂΜL (ref 0–0.1)
BASOPHILS NFR BLD AUTO: 1 % (ref 0–1)
BUN SERPL-MCNC: 13 MG/DL (ref 5–25)
CALCIUM SERPL-MCNC: 8.5 MG/DL (ref 8.4–10.2)
CHLORIDE SERPL-SCNC: 109 MMOL/L (ref 96–108)
CO2 SERPL-SCNC: 24 MMOL/L (ref 21–32)
CREAT SERPL-MCNC: 0.8 MG/DL (ref 0.6–1.3)
EOSINOPHIL # BLD AUTO: 0.41 THOUSAND/ÂΜL (ref 0–0.61)
EOSINOPHIL NFR BLD AUTO: 3 % (ref 0–6)
ERYTHROCYTE [DISTWIDTH] IN BLOOD BY AUTOMATED COUNT: 16.8 % (ref 11.6–15.1)
GFR SERPL CREATININE-BSD FRML MDRD: 78 ML/MIN/1.73SQ M
GLUCOSE SERPL-MCNC: 140 MG/DL (ref 65–140)
GLUCOSE SERPL-MCNC: 150 MG/DL (ref 65–140)
GLUCOSE SERPL-MCNC: 172 MG/DL (ref 65–140)
GLUCOSE SERPL-MCNC: 179 MG/DL (ref 65–140)
GLUCOSE SERPL-MCNC: 252 MG/DL (ref 65–140)
HCT VFR BLD AUTO: 29.3 % (ref 34.8–46.1)
HGB BLD-MCNC: 8.6 G/DL (ref 11.5–15.4)
IMM GRANULOCYTES # BLD AUTO: 0.21 THOUSAND/UL (ref 0–0.2)
IMM GRANULOCYTES NFR BLD AUTO: 2 % (ref 0–2)
LYMPHOCYTES # BLD AUTO: 3.79 THOUSANDS/ÂΜL (ref 0.6–4.47)
LYMPHOCYTES NFR BLD AUTO: 30 % (ref 14–44)
MCH RBC QN AUTO: 23.5 PG (ref 26.8–34.3)
MCHC RBC AUTO-ENTMCNC: 29.4 G/DL (ref 31.4–37.4)
MCV RBC AUTO: 80 FL (ref 82–98)
MONOCYTES # BLD AUTO: 0.9 THOUSAND/ÂΜL (ref 0.17–1.22)
MONOCYTES NFR BLD AUTO: 7 % (ref 4–12)
NEUTROPHILS # BLD AUTO: 7.45 THOUSANDS/ÂΜL (ref 1.85–7.62)
NEUTS SEG NFR BLD AUTO: 57 % (ref 43–75)
NRBC BLD AUTO-RTO: 0 /100 WBCS
PLATELET # BLD AUTO: 384 THOUSANDS/UL (ref 149–390)
PMV BLD AUTO: 10.1 FL (ref 8.9–12.7)
POTASSIUM SERPL-SCNC: 3.5 MMOL/L (ref 3.5–5.3)
RBC # BLD AUTO: 3.66 MILLION/UL (ref 3.81–5.12)
SODIUM SERPL-SCNC: 139 MMOL/L (ref 135–147)
WBC # BLD AUTO: 12.83 THOUSAND/UL (ref 4.31–10.16)

## 2023-09-28 PROCEDURE — 97110 THERAPEUTIC EXERCISES: CPT

## 2023-09-28 PROCEDURE — 80048 BASIC METABOLIC PNL TOTAL CA: CPT | Performed by: INTERNAL MEDICINE

## 2023-09-28 PROCEDURE — 97535 SELF CARE MNGMENT TRAINING: CPT

## 2023-09-28 PROCEDURE — 97530 THERAPEUTIC ACTIVITIES: CPT

## 2023-09-28 PROCEDURE — 97116 GAIT TRAINING THERAPY: CPT

## 2023-09-28 PROCEDURE — 82948 REAGENT STRIP/BLOOD GLUCOSE: CPT

## 2023-09-28 PROCEDURE — 99232 SBSQ HOSP IP/OBS MODERATE 35: CPT | Performed by: INTERNAL MEDICINE

## 2023-09-28 PROCEDURE — 85025 COMPLETE CBC W/AUTO DIFF WBC: CPT | Performed by: INTERNAL MEDICINE

## 2023-09-28 RX ORDER — LOSARTAN POTASSIUM 50 MG/1
100 TABLET ORAL
Status: DISCONTINUED | OUTPATIENT
Start: 2023-09-29 | End: 2023-09-30 | Stop reason: HOSPADM

## 2023-09-28 RX ORDER — HYDRALAZINE HYDROCHLORIDE 10 MG/1
10 TABLET, FILM COATED ORAL 3 TIMES DAILY
Status: DISCONTINUED | OUTPATIENT
Start: 2023-09-29 | End: 2023-09-30 | Stop reason: HOSPADM

## 2023-09-28 RX ORDER — HYDRALAZINE HYDROCHLORIDE 20 MG/ML
5 INJECTION INTRAMUSCULAR; INTRAVENOUS EVERY 6 HOURS PRN
Status: DISCONTINUED | OUTPATIENT
Start: 2023-09-28 | End: 2023-09-30 | Stop reason: HOSPADM

## 2023-09-28 RX ADMIN — METOPROLOL TARTRATE 100 MG: 100 TABLET, FILM COATED ORAL at 21:38

## 2023-09-28 RX ADMIN — APIXABAN 5 MG: 5 TABLET, FILM COATED ORAL at 17:17

## 2023-09-28 RX ADMIN — PIPERACILLIN SODIUM AND TAZOBACTAM SODIUM 3.38 G: 36; 4.5 INJECTION, POWDER, LYOPHILIZED, FOR SOLUTION INTRAVENOUS at 14:18

## 2023-09-28 RX ADMIN — PIPERACILLIN SODIUM AND TAZOBACTAM SODIUM 3.38 G: 36; 4.5 INJECTION, POWDER, LYOPHILIZED, FOR SOLUTION INTRAVENOUS at 20:28

## 2023-09-28 RX ADMIN — LOSARTAN POTASSIUM 100 MG: 50 TABLET, FILM COATED ORAL at 08:14

## 2023-09-28 RX ADMIN — GABAPENTIN 100 MG: 100 CAPSULE ORAL at 08:14

## 2023-09-28 RX ADMIN — AMLODIPINE BESYLATE 5 MG: 5 TABLET ORAL at 08:14

## 2023-09-28 RX ADMIN — NYSTATIN: 100000 POWDER TOPICAL at 17:20

## 2023-09-28 RX ADMIN — HYDRALAZINE HYDROCHLORIDE 10 MG: 10 TABLET, FILM COATED ORAL at 17:17

## 2023-09-28 RX ADMIN — SODIUM CHLORIDE, SODIUM GLUCONATE, SODIUM ACETATE, POTASSIUM CHLORIDE, MAGNESIUM CHLORIDE, SODIUM PHOSPHATE, DIBASIC, AND POTASSIUM PHOSPHATE 100 ML/HR: .53; .5; .37; .037; .03; .012; .00082 INJECTION, SOLUTION INTRAVENOUS at 01:32

## 2023-09-28 RX ADMIN — SODIUM CHLORIDE, SODIUM GLUCONATE, SODIUM ACETATE, POTASSIUM CHLORIDE, MAGNESIUM CHLORIDE, SODIUM PHOSPHATE, DIBASIC, AND POTASSIUM PHOSPHATE 100 ML/HR: .53; .5; .37; .037; .03; .012; .00082 INJECTION, SOLUTION INTRAVENOUS at 14:20

## 2023-09-28 RX ADMIN — INSULIN GLARGINE 18 UNITS: 100 INJECTION, SOLUTION SUBCUTANEOUS at 21:23

## 2023-09-28 RX ADMIN — INSULIN LISPRO 2 UNITS: 100 INJECTION, SOLUTION INTRAVENOUS; SUBCUTANEOUS at 17:17

## 2023-09-28 RX ADMIN — INSULIN LISPRO 6 UNITS: 100 INJECTION, SOLUTION INTRAVENOUS; SUBCUTANEOUS at 21:23

## 2023-09-28 RX ADMIN — INSULIN GLARGINE 18 UNITS: 100 INJECTION, SOLUTION SUBCUTANEOUS at 08:51

## 2023-09-28 RX ADMIN — GABAPENTIN 100 MG: 100 CAPSULE ORAL at 17:17

## 2023-09-28 RX ADMIN — ERYTHROMYCIN 0.5 INCH: 5 OINTMENT OPHTHALMIC at 05:42

## 2023-09-28 RX ADMIN — ERYTHROMYCIN 0.5 INCH: 5 OINTMENT OPHTHALMIC at 17:17

## 2023-09-28 RX ADMIN — ERYTHROMYCIN 0.5 INCH: 5 OINTMENT OPHTHALMIC at 11:51

## 2023-09-28 RX ADMIN — INSULIN LISPRO 2 UNITS: 100 INJECTION, SOLUTION INTRAVENOUS; SUBCUTANEOUS at 11:50

## 2023-09-28 RX ADMIN — INSULIN LISPRO 5 UNITS: 100 INJECTION, SOLUTION INTRAVENOUS; SUBCUTANEOUS at 08:13

## 2023-09-28 RX ADMIN — PIPERACILLIN SODIUM AND TAZOBACTAM SODIUM 3.38 G: 36; 4.5 INJECTION, POWDER, LYOPHILIZED, FOR SOLUTION INTRAVENOUS at 08:13

## 2023-09-28 RX ADMIN — PIPERACILLIN SODIUM AND TAZOBACTAM SODIUM 3.38 G: 36; 4.5 INJECTION, POWDER, LYOPHILIZED, FOR SOLUTION INTRAVENOUS at 01:32

## 2023-09-28 RX ADMIN — HYDRALAZINE HYDROCHLORIDE 10 MG: 10 TABLET, FILM COATED ORAL at 08:14

## 2023-09-28 RX ADMIN — ATORVASTATIN CALCIUM 40 MG: 40 TABLET, FILM COATED ORAL at 08:14

## 2023-09-28 RX ADMIN — NYSTATIN: 100000 POWDER TOPICAL at 08:13

## 2023-09-28 RX ADMIN — INSULIN LISPRO 5 UNITS: 100 INJECTION, SOLUTION INTRAVENOUS; SUBCUTANEOUS at 11:50

## 2023-09-28 RX ADMIN — PREDNISONE 4 MG: 1 TABLET ORAL at 08:14

## 2023-09-28 RX ADMIN — APIXABAN 5 MG: 5 TABLET, FILM COATED ORAL at 08:14

## 2023-09-28 RX ADMIN — HYDRALAZINE HYDROCHLORIDE 5 MG: 20 INJECTION, SOLUTION INTRAMUSCULAR; INTRAVENOUS at 18:32

## 2023-09-28 RX ADMIN — FLUTICASONE FUROATE AND VILANTEROL TRIFENATATE 1 PUFF: 200; 25 POWDER RESPIRATORY (INHALATION) at 08:13

## 2023-09-28 RX ADMIN — INSULIN LISPRO 5 UNITS: 100 INJECTION, SOLUTION INTRAVENOUS; SUBCUTANEOUS at 17:17

## 2023-09-28 RX ADMIN — TOPIRAMATE 100 MG: 100 TABLET, FILM COATED ORAL at 21:38

## 2023-09-28 RX ADMIN — METOPROLOL TARTRATE 100 MG: 100 TABLET, FILM COATED ORAL at 08:14

## 2023-09-28 RX ADMIN — LIDOCAINE 1 PATCH: 700 PATCH TOPICAL at 08:13

## 2023-09-28 NOTE — PLAN OF CARE
Problem: MOBILITY - ADULT  Goal: Maintain or return to baseline ADL function  Description: INTERVENTIONS:  -  Assess patient's ability to carry out ADLs; assess patient's baseline for ADL function and identify physical deficits which impact ability to perform ADLs (bathing, care of mouth/teeth, toileting, grooming, dressing, etc.)  - Assess/evaluate cause of self-care deficits   - Assess range of motion  - Assess patient's mobility; develop plan if impaired  - Assess patient's need for assistive devices and provide as appropriate  - Encourage maximum independence but intervene and supervise when necessary  - Involve family in performance of ADLs  - Assess for home care needs following discharge   - Consider OT consult to assist with ADL evaluation and planning for discharge  - Provide patient education as appropriate  Outcome: Progressing  Goal: Maintains/Returns to pre admission functional level  Description: INTERVENTIONS:  - Perform BMAT or MOVE assessment daily.   - Set and communicate daily mobility goal to care team and patient/family/caregiver.    - Collaborate with rehabilitation services on mobility goals if consulted  - Out of bed for toileting  - Record patient progress and toleration of activity level   Outcome: Progressing     Problem: PAIN - ADULT  Goal: Verbalizes/displays adequate comfort level or baseline comfort level  Description: Interventions:  - Encourage patient to monitor pain and request assistance  - Assess pain using appropriate pain scale  - Administer analgesics based on type and severity of pain and evaluate response  - Implement non-pharmacological measures as appropriate and evaluate response  - Consider cultural and social influences on pain and pain management  - Notify physician/advanced practitioner if interventions unsuccessful or patient reports new pain  Outcome: Progressing     Problem: INFECTION - ADULT  Goal: Absence or prevention of progression during hospitalization  Description: INTERVENTIONS:  - Assess and monitor for signs and symptoms of infection  - Monitor lab/diagnostic results  - Monitor all insertion sites, i.e. indwelling lines, tubes, and drains  - Monitor endotracheal if appropriate and nasal secretions for changes in amount and color  - [place] appropriate cooling/warming therapies per order  - Administer medications as ordered  - Instruct and encourage patient and family to use good hand hygiene technique  - Identify and instruct in appropriate isolation precautions for identified infection/condition  Outcome: Progressing     Problem: SAFETY ADULT  Goal: Maintain or return to baseline ADL function  Description: INTERVENTIONS:  -  Assess patient's ability to carry out ADLs; assess patient's baseline for ADL function and identify physical deficits which impact ability to perform ADLs (bathing, care of mouth/teeth, toileting, grooming, dressing, etc.)  - Assess/evaluate cause of self-care deficits   - Assess range of motion  - Assess patient's mobility; develop plan if impaired  - Assess patient's need for assistive devices and provide as appropriate  - Encourage maximum independence but intervene and supervise when necessary  - Involve family in performance of ADLs  - Assess for home care needs following discharge   - Consider OT consult to assist with ADL evaluation and planning for discharge  - Provide patient education as appropriate  Outcome: Progressing  Goal: Maintains/Returns to pre admission functional level  Description: INTERVENTIONS:  - Perform BMAT or MOVE assessment daily.   - Set and communicate daily mobility goal to care team and patient/family/caregiver.    - Collaborate with rehabilitation services on mobility goals if consulted  - Out of bed for toileting  - Record patient progress and toleration of activity level   Outcome: Progressing  Goal: Patient will remain free of falls  Description: INTERVENTIONS:  - Educate patient/family on patient safety including physical limitations  - Instruct patient to call for assistance with activity   - Consult OT/PT to assist with strengthening/mobility   - Keep Call bell within reach  - Keep bed low and locked with side rails adjusted as appropriate  - Keep care items and personal belongings within reach  - Initiate and maintain comfort rounds  - Make Fall Risk Sign visible to staff  - Apply yellow socks and bracelet for high fall risk patients  - Consider moving patient to room near nurses station  Outcome: Progressing     Problem: DISCHARGE PLANNING  Goal: Discharge to home or other facility with appropriate resources  Description: INTERVENTIONS:  - Identify barriers to discharge w/patient and caregiver  - Arrange for needed discharge resources and transportation as appropriate  - Identify discharge learning needs (meds, wound care, etc.)  - Arrange for interpretive services to assist at discharge as needed  - Refer to Case Management Department for coordinating discharge planning if the patient needs post-hospital services based on physician/advanced practitioner order or complex needs related to functional status, cognitive ability, or social support system  Outcome: Progressing     Problem: Knowledge Deficit  Goal: Patient/family/caregiver demonstrates understanding of disease process, treatment plan, medications, and discharge instructions  Description: Complete learning assessment and assess knowledge base.   Interventions:  - Provide teaching at level of understanding  - Provide teaching via preferred learning methods  Outcome: Progressing     Problem: CARDIOVASCULAR - ADULT  Goal: Maintains optimal cardiac output and hemodynamic stability  Description: INTERVENTIONS:  - Monitor I/O, vital signs and rhythm  - Monitor for S/S and trends of decreased cardiac output  - Administer and titrate ordered vasoactive medications to optimize hemodynamic stability  - Assess quality of pulses, skin color and temperature  - Assess for signs of decreased coronary artery perfusion  - Instruct patient to report change in severity of symptoms  Outcome: Progressing  Goal: Absence of cardiac dysrhythmias or at baseline rhythm  Description: INTERVENTIONS:  - Continuous cardiac monitoring, vital signs, obtain 12 lead EKG if ordered  - Administer antiarrhythmic and heart rate control medications as ordered  - Monitor electrolytes and administer replacement therapy as ordered  Outcome: Progressing     Problem: RESPIRATORY - ADULT  Goal: Achieves optimal ventilation and oxygenation  Description: INTERVENTIONS:  - Assess for changes in respiratory status  - Assess for changes in mentation and behavior  - Position to facilitate oxygenation and minimize respiratory effort  - Oxygen administered by appropriate delivery if ordered  - Initiate smoking cessation education as indicated  - Encourage broncho-pulmonary hygiene including cough, deep breathe, Incentive Spirometry  - Assess the need for suctioning and aspirate as needed  - Assess and instruct to report SOB or any respiratory difficulty  - Respiratory Therapy support as indicated  Outcome: Progressing     Problem: METABOLIC, FLUID AND ELECTROLYTES - ADULT  Goal: Glucose maintained within target range  Description: INTERVENTIONS:  - Monitor Blood Glucose as ordered  - Assess for signs and symptoms of hyperglycemia and hypoglycemia  - Administer ordered medications to maintain glucose within target range  - Assess nutritional intake and initiate nutrition service referral as needed  Outcome: Progressing     Problem: SKIN/TISSUE INTEGRITY - ADULT  Goal: Incision(s), wounds(s) or drain site(s) healing without S/S of infection  Description: INTERVENTIONS  - Assess and document dressing, incision, wound bed, drain sites and surrounding tissue  - Provide patient and family education  - Perform skin care/dressing changes every shift  Outcome: Progressing     Problem: Prexisting or High Potential for Compromised Skin Integrity  Goal: Skin integrity is maintained or improved  Description: INTERVENTIONS:  - Identify patients at risk for skin breakdown  - Assess and monitor skin integrity  - Assess and monitor nutrition and hydration status  - Monitor labs   - Assess for incontinence   - Turn and reposition patient  - Assist with mobility/ambulation  - Relieve pressure over bony prominences  - Avoid friction and shearing  - Provide appropriate hygiene as needed including keeping skin clean and dry  - Evaluate need for skin moisturizer/barrier cream  - Collaborate with interdisciplinary team   - Patient/family teaching  - Consider wound care consult   Outcome: Progressing     Problem: Nutrition/Hydration-ADULT  Goal: Nutrient/Hydration intake appropriate for improving, restoring or maintaining nutritional needs  Description: Monitor and assess patient's nutrition/hydration status for malnutrition. Collaborate with interdisciplinary team and initiate plan and interventions as ordered. Monitor patient's weight and dietary intake as ordered or per policy. Utilize nutrition screening tool and intervene as necessary. Determine patient's food preferences and provide high-protein, high-caloric foods as appropriate.      INTERVENTIONS:  - Monitor oral intake, urinary output, labs, and treatment plans  - Assess nutrition and hydration status and recommend course of action  - Evaluate amount of meals eaten  - Assist patient with eating if necessary   - Allow adequate time for meals  - Recommend/ encourage appropriate diets, oral nutritional supplements, and vitamin/mineral supplements  - Order, calculate, and assess calorie counts as needed  - Recommend, monitor, and adjust tube feedings  based on assessed needs  - Assess need for intravenous fluids  - Provide  nutrition/hydration education as appropriate  - Include patient/family/caregiver in decisions related to nutrition  Outcome: Progressing

## 2023-09-28 NOTE — ASSESSMENT & PLAN NOTE
Physical Therapy Daily Treatment Note    Date:  2020    Patient Name:  Nona Kraft    :  1957  MRN: [id number]  Restrictions/Precautions:     Medical/Treatment Diagnosis Information:  Diagnosis: L shoulder Rotator Cuff Repair  Insurance/Certification information:  PT Insurance Information: Primary: [employer] Secondary: [facility]- Approved 18 visits until   Physician Information:  Referring Practitioner: Savi Orr  Plan of care signed (Y/N):  Y  Visit# / total visits:       G-Code (if applicable): Quick Dash: Sum of Responses: 37, Symptom Score: 59%    Medicare Cap (if applicable):  N/A = total amount used, updated 2020    Time in:   10:25   Timed Treatment: 35 Total Treatment Time:  35  Time out: 11:00  ________________________________________________________________________________________    Pain Level:    0/10    SUBJECTIVE:  Pt reported he has been pretty sore these past few days. Soreness has been getting better. OBJECTIVE:  Pt experienced dizziness after performing supine<>sit. 75 degrees of passive ER at 45 degrees of abduction  75 degrees of passive ER at 90 degrees of abduction  130 degrees of shoulder flexion.     Exercise/Equipment Resistance/Repetitions Other comments    AROM wrist and elbow      Elbow Flexion/Extension      Wrist Flexion/Extension      HEP  HEP     Pendulums     Forward/bakward     Side/side     Circles    x15  x15  x15   HEP  HEP  HEP     Passive ER   Passive Flexion   x15 HEP  HEP     Table slides   Pain free     Shoulder shrugs    Scapular retraction x20  x20           AAROM with ball       Flexion      Scaption   2x30  2x30      Isometric strengthening         Flexion         IR         ER          Extension   5\"x10  5\"x10  5\"x10  5\"x10   HEP  HEP  HEP  HEP     Pulleys                                                                                Other Therapeutic Activities:       Manual Treatments:       Passive flexion · Patient notes that since leaving short-term rehab she has remained in her wheelchair and not ambulated   · She notes she has become progressively weaker due to this   · PT/OT lashay appreciated:   · Patient will be discharged to short-term rehab: Case management coordinating

## 2023-09-28 NOTE — PLAN OF CARE
Problem: OCCUPATIONAL THERAPY ADULT  Goal: Performs self-care activities at highest level of function for planned discharge setting. See evaluation for individualized goals. Description: Treatment Interventions: ADL retraining, Functional transfer training, UE strengthening/ROM, Endurance training, Cognitive reorientation, Patient/family training, Equipment evaluation/education, Compensatory technique education          See flowsheet documentation for full assessment, interventions and recommendations. Outcome: Progressing  Note: Limitation: Decreased ADL status, Decreased UE strength, Decreased Safe judgement during ADL, Decreased endurance, Decreased high-level ADLs  Prognosis: Fair  Assessment: Pt seen for 50 min tx session with focus on functional balance, functional mobility, ADL status, transfer safety, b/l UE strengthening, and cognition. Pt able to tolerate OOB mobility; sitting balance=f+/f, standing balance=f-/p+. Pt required verbal/physical cues to maintain transfer safety. Pt demonstrating need for assistance with LE ADLs. Pt able to demonstrate good cognition(i.e.orientation, memory). Pt continues to demonstrate appropriateness for inpt rehab to improve her overall level of independence. Will continue. The patient's raw score on the AM-PAC Daily Activity Inpatient Short Form is 17. A raw score of less than 19 suggests the patient may benefit from discharge to post-acute rehabilitation services. Please refer to the recommendation of the Occupational Therapist for safe discharge planning.      OT Discharge Recommendation: Post acute rehabilitation services

## 2023-09-28 NOTE — PLAN OF CARE
Problem: MOBILITY - ADULT  Goal: Maintain or return to baseline ADL function  Description: INTERVENTIONS:  -  Assess patient's ability to carry out ADLs; assess patient's baseline for ADL function and identify physical deficits which impact ability to perform ADLs (bathing, care of mouth/teeth, toileting, grooming, dressing, etc.)  - Assess/evaluate cause of self-care deficits   - Assess range of motion  - Assess patient's mobility; develop plan if impaired  - Assess patient's need for assistive devices and provide as appropriate  - Encourage maximum independence but intervene and supervise when necessary  - Involve family in performance of ADLs  - Assess for home care needs following discharge   - Consider OT consult to assist with ADL evaluation and planning for discharge  - Provide patient education as appropriate  Outcome: Progressing  Goal: Maintains/Returns to pre admission functional level  Description: INTERVENTIONS:  - Perform BMAT or MOVE assessment daily.   - Set and communicate daily mobility goal to care team and patient/family/caregiver. - Collaborate with rehabilitation services on mobility goals if consulted  - Perform Range of Motion 3 times a day. - Reposition patient every 2 hours.   - Dangle patient 3 times a day  - Stand patient 3 times a day  - Ambulate patient 3 times a day  - Out of bed to chair 3 times a day   - Out of bed for meals 3 times a day  - Out of bed for toileting  - Record patient progress and toleration of activity level   Outcome: Progressing     Problem: PAIN - ADULT  Goal: Verbalizes/displays adequate comfort level or baseline comfort level  Description: Interventions:  - Encourage patient to monitor pain and request assistance  - Assess pain using appropriate pain scale  - Administer analgesics based on type and severity of pain and evaluate response  - Implement non-pharmacological measures as appropriate and evaluate response  - Consider cultural and social influences on pain and pain management  - Notify physician/advanced practitioner if interventions unsuccessful or patient reports new pain  Outcome: Progressing     Problem: INFECTION - ADULT  Goal: Absence or prevention of progression during hospitalization  Description: INTERVENTIONS:  - Assess and monitor for signs and symptoms of infection  - Monitor lab/diagnostic results  - Monitor all insertion sites, i.e. indwelling lines, tubes, and drains  - Monitor endotracheal if appropriate and nasal secretions for changes in amount and color  - [place] appropriate cooling/warming therapies per order  - Administer medications as ordered  - Instruct and encourage patient and family to use good hand hygiene technique  - Identify and instruct in appropriate isolation precautions for identified infection/condition  Outcome: Progressing     Problem: SAFETY ADULT  Goal: Maintain or return to baseline ADL function  Description: INTERVENTIONS:  -  Assess patient's ability to carry out ADLs; assess patient's baseline for ADL function and identify physical deficits which impact ability to perform ADLs (bathing, care of mouth/teeth, toileting, grooming, dressing, etc.)  - Assess/evaluate cause of self-care deficits   - Assess range of motion  - Assess patient's mobility; develop plan if impaired  - Assess patient's need for assistive devices and provide as appropriate  - Encourage maximum independence but intervene and supervise when necessary  - Involve family in performance of ADLs  - Assess for home care needs following discharge   - Consider OT consult to assist with ADL evaluation and planning for discharge  - Provide patient education as appropriate  Outcome: Progressing  Goal: Maintains/Returns to pre admission functional level  Description: INTERVENTIONS:  - Perform BMAT or MOVE assessment daily.   - Set and communicate daily mobility goal to care team and patient/family/caregiver.    - Collaborate with rehabilitation services on mobility goals if consulted  - Perform Range of Motion 3 times a day. - Reposition patient every 2 hours.   - Dangle patient 3 times a day  - Stand patient 3 times a day  - Ambulate patient 3 times a day  - Out of bed to chair 3 times a day   - Out of bed for meals 3 times a day  - Out of bed for toileting  - Record patient progress and toleration of activity level   Outcome: Progressing  Goal: Patient will remain free of falls  Description: INTERVENTIONS:  - Educate patient/family on patient safety including physical limitations  - Instruct patient to call for assistance with activity   - Consult OT/PT to assist with strengthening/mobility   - Keep Call bell within reach  - Keep bed low and locked with side rails adjusted as appropriate  - Keep care items and personal belongings within reach  - Initiate and maintain comfort rounds  - Make Fall Risk Sign visible to staff  - Offer Toileting every 2 Hours, in advance of need  - Initiate/Maintain bed bed alarm  - Obtain necessary fall risk management equipment: bed alarm  - Apply yellow socks and bracelet for high fall risk patients  - Consider moving patient to room near nurses station  Outcome: Progressing     Problem: DISCHARGE PLANNING  Goal: Discharge to home or other facility with appropriate resources  Description: INTERVENTIONS:  - Identify barriers to discharge w/patient and caregiver  - Arrange for needed discharge resources and transportation as appropriate  - Identify discharge learning needs (meds, wound care, etc.)  - Arrange for interpretive services to assist at discharge as needed  - Refer to Case Management Department for coordinating discharge planning if the patient needs post-hospital services based on physician/advanced practitioner order or complex needs related to functional status, cognitive ability, or social support system  Outcome: Progressing     Problem: Knowledge Deficit  Goal: Patient/family/caregiver demonstrates understanding of disease process, treatment plan, medications, and discharge instructions  Description: Complete learning assessment and assess knowledge base.   Interventions:  - Provide teaching at level of understanding  - Provide teaching via preferred learning methods  Outcome: Progressing     Problem: CARDIOVASCULAR - ADULT  Goal: Maintains optimal cardiac output and hemodynamic stability  Description: INTERVENTIONS:  - Monitor I/O, vital signs and rhythm  - Monitor for S/S and trends of decreased cardiac output  - Administer and titrate ordered vasoactive medications to optimize hemodynamic stability  - Assess quality of pulses, skin color and temperature  - Assess for signs of decreased coronary artery perfusion  - Instruct patient to report change in severity of symptoms  Outcome: Progressing  Goal: Absence of cardiac dysrhythmias or at baseline rhythm  Description: INTERVENTIONS:  - Continuous cardiac monitoring, vital signs, obtain 12 lead EKG if ordered  - Administer antiarrhythmic and heart rate control medications as ordered  - Monitor electrolytes and administer replacement therapy as ordered  Outcome: Progressing     Problem: RESPIRATORY - ADULT  Goal: Achieves optimal ventilation and oxygenation  Description: INTERVENTIONS:  - Assess for changes in respiratory status  - Assess for changes in mentation and behavior  - Position to facilitate oxygenation and minimize respiratory effort  - Oxygen administered by appropriate delivery if ordered  - Initiate smoking cessation education as indicated  - Encourage broncho-pulmonary hygiene including cough, deep breathe, Incentive Spirometry  - Assess the need for suctioning and aspirate as needed  - Assess and instruct to report SOB or any respiratory difficulty  - Respiratory Therapy support as indicated  Outcome: Progressing     Problem: METABOLIC, FLUID AND ELECTROLYTES - ADULT  Goal: Glucose maintained within target range  Description: INTERVENTIONS:  - Monitor Blood Glucose as ordered  - Assess for signs and symptoms of hyperglycemia and hypoglycemia  - Administer ordered medications to maintain glucose within target range  - Assess nutritional intake and initiate nutrition service referral as needed  Outcome: Progressing     Problem: SKIN/TISSUE INTEGRITY - ADULT  Goal: Incision(s), wounds(s) or drain site(s) healing without S/S of infection  Description: INTERVENTIONS  - Assess and document dressing, incision, wound bed, drain sites and surrounding tissue  - Provide patient and family education  - Perform skin care/dressing changes  Outcome: Progressing     Problem: Prexisting or High Potential for Compromised Skin Integrity  Goal: Skin integrity is maintained or improved  Description: INTERVENTIONS:  - Identify patients at risk for skin breakdown  - Assess and monitor skin integrity  - Assess and monitor nutrition and hydration status  - Monitor labs   - Assess for incontinence   - Turn and reposition patient  - Assist with mobility/ambulation  - Relieve pressure over bony prominences  - Avoid friction and shearing  - Provide appropriate hygiene as needed including keeping skin clean and dry  - Evaluate need for skin moisturizer/barrier cream  - Collaborate with interdisciplinary team   - Patient/family teaching  - Consider wound care consult   Outcome: Progressing     Problem: Nutrition/Hydration-ADULT  Goal: Nutrient/Hydration intake appropriate for improving, restoring or maintaining nutritional needs  Description: Monitor and assess patient's nutrition/hydration status for malnutrition. Collaborate with interdisciplinary team and initiate plan and interventions as ordered. Monitor patient's weight and dietary intake as ordered or per policy. Utilize nutrition screening tool and intervene as necessary. Determine patient's food preferences and provide high-protein, high-caloric foods as appropriate.      INTERVENTIONS:  - Monitor oral intake, urinary output, labs, and treatment plans  - Assess nutrition and hydration status and recommend course of action  - Evaluate amount of meals eaten  - Assist patient with eating if necessary   - Allow adequate time for meals  - Recommend/ encourage appropriate diets, oral nutritional supplements, and vitamin/mineral supplements  - Order, calculate, and assess calorie counts as needed  - Recommend, monitor, and adjust tube feedings  based on assessed needs  - Assess need for intravenous fluids  - Provide  nutrition/hydration education as appropriate  - Include patient/family/caregiver in decisions related to nutrition  Outcome: Progressing

## 2023-09-28 NOTE — ASSESSMENT & PLAN NOTE
Lab Results   Component Value Date    HGBA1C 10.2 (H) 08/24/2023       Recent Labs     09/27/23  1623 09/27/23  2043 09/28/23  0723 09/28/23  1121   POCGLU 184* 220* 140 179*       Blood Sugar Average: Last 72 hrs:  (P) 171.5   · Continue home insulin regimen of 18 units of Lantus q12 hours with 5 units of lispro tid with meals  · Blood sugars adequately controlled  · SSI with accucheks

## 2023-09-28 NOTE — CASE MANAGEMENT
Case Management Discharge Planning Note    Patient name Reymundo Walker  Location [facility] /E5 -* MRN [id number]  : 1960 Date 2023       Current Admission Date: 2023  Current Admission Diagnosis:Severe sepsis [facility])   Patient Active Problem List    Diagnosis Date Noted   • Cellulitis of lower extremity 2023   • Chronic anemia 2023   • Acute viral conjunctivitis of both eyes 2023   • Pulmonary embolism ([address]) 2023   • Diabetic foot ulcer ([address]) 2023   • Ulcer of left heel ([address]) 2023   • Homelessness 2023   • Encounter for support and coordination of transition of care 2023   • Food insecurity 2023   • Leukocytosis 2023   • Friction blisters of the soles 2023   • Calculus of gallbladder without cholecystitis without obstruction 2023   • Severe sepsis ([address]) 2023   • Diabetes mellitus ([address]) 2023   • Abnormal CT scan 2023   • Suspected pressure injury of deep tissue 03/15/2023   • Dyslipidemia 2023   • Insulin long-term use ([address]) 2023   • Type 2 diabetes mellitus with hyperglycemia ([address]) 2023   • Ambulatory dysfunction 2023   • Legally blind 2023   • Diarrhea 2023   • Bilateral lower extremity edema 2023   • Unsheltered homelessness 2023   • Abnormal urinalysis 2023   • Weakness 2023   • Intertrigo 2023   • Leg numbness 2023   • Unsteadiness on feet 2022   • Obstructive sleep apnea 2022   • Diabetic neuropathy ([address]) 2022   • Stage 3a chronic kidney disease ([address]) 2022   • Polymyalgia rheumatica ([address]) 2021   • Gait instability 2021   • Ulnar neuropathy of right upper extremity    • Blurry vision, bilateral 2021   • Depressed mood 10/08/2020   • Hypertension 05/10/2020   • Chronic migraine without aura without status migrainosus, not intractable 2020   • Hypersomnia 2020   • Cerebral aneurysm without rupture 04/06/2020   • History of absence seizures 03/25/2020   • Thyroid nodule 03/06/2020   • Aneurysm ([address]) 03/05/2020   • Type 2 diabetes mellitus with hyperglycemia, with long-term current use of insulin ([address]) 02/21/2020   • Left cavernous carotid aneurysm 02/10/2020   • Polyneuropathy associated with underlying disease ([address]) 01/07/2020   • PMR (polymyalgia rheumatica) ([address]) 01/07/2020   • Thrombocytosis 01/07/2020   • Morbid obesity ([address]) 09/19/2019   • Other inflammatory and immune myopathies, not elsewhere classified 09/16/2019   • Type 2 diabetes mellitus with microalbuminuria, with long-term current use of insulin ([address]) 02/01/2019   • Mild intermittent asthma without complication 49/14/7373   • Orthostatic hypotension 06/25/2018   • Lung nodules 03/22/2018   • Exertional dyspnea 02/13/2018   • Enlarged pulmonary artery ([address]) 02/13/2018   • Aortic dilatation (HCC) 02/13/2018   • Uncontrolled type 2 diabetes mellitus with hyperglycemia (HCC)    • Seizures ([address])    • QT prolongation 12/18/2017   • Decreased pulses in feet 12/11/2017   • Microalbuminuria 09/08/2015   • Vitamin D deficiency 06/06/2014   • Visual impairment in both eyes 12/06/2012   • Anemia 03/20/2012   • Hyperlipidemia 03/20/2012   • Neuropathy of hand, right 03/20/2012      LOS (days): 4  Geometric Mean LOS (GMLOS) (days): 3.50  Days to GMLOS:-1     OBJECTIVE:  Risk of Unplanned Readmission Score: 41.45         Current admission status: Inpatient   Preferred Pharmacy:   [address] [facility] [address] [address] [address]  Phone: [phone] Fax: [address] [place] [address] [address] [facility],  [facility],  [address] [place] [address]  Phone: [phone] Fax: [phone]    Primary Care Provider: Ivory Santana MD    Primary Insurance: [facility] [address] [facility]  Secondary Insurance: [address] [facility]    DISCHARGE DETAILS:    Discharge planning discussed with[de-identified] Patient     Additional Comments: CM provided and went over the rehab choices with patient. CM advised [facility] is still pending a decision, CM requested a 2nd choice for rehab- patient states she does not want any of the facilities on the list. CM advised a second choice needs to be made. Patient state she will look over the list and let CM know. Patient states the other facilities are too far away.

## 2023-09-28 NOTE — ASSESSMENT & PLAN NOTE
Pt is a 64-year-old female with past medical history significant for with PMHx of homelessness, legally blind, wheelchair bound, DM2, chronic diabetic foot wounds, CKD, and PE anticoagulation presented to the ED with complaints of bilateral lower extremity redness and swelling for the past 2 weeks    · Patient recently discharged from short term rehab about 2 weeks prior to this admission, after recent [facility] admission for COVID  · She had been living unsheltered since that time. She reports that her bandages on her feet had not been changed since she left the rehab  Patient presented to the ED fulfilling septic criteria with tachycardia and leukocytosis 16.10, and lactic acidosis  Septic source: Bilateral lower extremity cellulitis, and bilateral foot wounds with superimposed infection  CT bilateral lower extremities concerning for cellulitis   CXR with no acute cardiopulmonary abnormalities  UA without evidence of infection  Blood cultures negative thus far  Lactic acid 3.7->1.5  Patient was given IV fluid resuscitation, and empiric antibiotics and admitted  She continues to improve:  Afebrile, with improved leukocytosis  Currently treated with zosyn day 5- will continue iv antibiotics until discharged or completes 7 day course

## 2023-09-28 NOTE — OCCUPATIONAL THERAPY NOTE
Occupational Therapy Progress Note     Patient Name: Renetta Sandhu  HJFGP'N Date: 9/28/2023  Problem List  Principal Problem:    Severe sepsis [facility])  Active Problems:    Uncontrolled type 2 diabetes mellitus with hyperglycemia (HCC)    PMR (polymyalgia rheumatica) (HCC)    Hypertension    Stage 3a chronic kidney disease (HCC)    Ambulatory dysfunction    Calculus of gallbladder without cholecystitis without obstruction    Homelessness    Pulmonary embolism (HCC)    Diabetic foot ulcer (HCC)    Acute viral conjunctivitis of both eyes    Cellulitis of lower extremity    Chronic anemia        09/28/23 1419   Note Type   Note Type Treatment   Pain Assessment   Pain Assessment Tool 0-10   Pain Score 8   Pain Location/Orientation Orientation: Bilateral;Location: Leg   Restrictions/Precautions   Weight Bearing Precautions Per Order Yes   RLE Weight Bearing Per Order WBAT   LLE Weight Bearing Per Order WBAT   ADL   Where Assessed Edge of bed   Eating Assistance 6  Modified independent   Grooming Assistance 6  Modified Independent   UB Bathing Assistance 5  Supervision/Setup   LB Bathing Assistance 4  Minimal Assistance   UB Dressing Assistance 5  Supervision/Setup   LB Dressing Assistance 3  Moderate Assistance   Toileting Assistance  4  Minimal Assistance   Functional Standing Tolerance   Time 1-2mins   Bed Mobility   Rolling R 5  Supervision   Rolling L 5  Supervision   Supine to Sit 5  Supervision   Transfers   Sit to Stand 3  Moderate assistance   Additional items Assist x 2; Increased time required;Verbal cues   Stand to Sit 4  Minimal assistance   Additional items Assist x 1; Increased time required;Verbal cues   Functional Mobility   Functional Mobility 3  Moderate assistance   Additional Comments x1   Additional items Rolling walker   Therapeutic Excerise-Strength   UE Strength Yes  (b/l UE AROM exercises(i.e.shr flex/ext, w/c pushups, 1 set, 5-10reps))   Subjective   Subjective "I did well today."   Cognition Overall Cognitive Status WFL   Arousal/Participation Alert   Attention Within functional limits   Orientation Level Oriented X4   Memory Within functional limits   Following Commands Follows all commands and directions without difficulty   Activity Tolerance   Activity Tolerance Patient limited by fatigue;Patient limited by pain   Medical Staff Made Aware sathya, P.T., CM   Assessment   Assessment Pt seen for 50 min tx session with focus on functional balance, functional mobility, ADL status, transfer safety, b/l UE strengthening, and cognition. Pt able to tolerate OOB mobility; sitting balance=f+/f, standing balance=f-/p+. Pt required verbal/physical cues to maintain transfer safety. Pt demonstrating need for assistance with LE ADLs. Pt able to demonstrate good cognition(i.e.orientation, memory). Pt continues to demonstrate appropriateness for inpt rehab to improve her overall level of independence. Will continue. The patient's raw score on the AM-PAC Daily Activity Inpatient Short Form is 17. A raw score of less than 19 suggests the patient may benefit from discharge to post-acute rehabilitation services. Please refer to the recommendation of the Occupational Therapist for safe discharge planning. Plan   Treatment Interventions ADL retraining;Functional transfer training;UE strengthening/ROM; Endurance training;Cognitive reorientation;Patient/family training;Equipment evaluation/education; Compensatory technique education   Goal Expiration Date 10/09/23   OT Treatment Day 1   OT Frequency 3-5x/wk   Recommendation   OT Discharge Recommendation Post acute rehabilitation services   [facility] Daily Activity Inpatient   Lower Body Dressing 2   Bathing 2   Toileting 2   Upper Body Dressing 3   Grooming 4   Eating 4   Daily Activity Raw Score 17   Daily Activity Standardized Score (Calc for Raw Score >=11) 37.26   AM-PAC Applied Cognition Inpatient   Following a Speech/Presentation 4   Understanding Ordinary Conversation 4 Taking Medications 4   Remembering Where Things Are Placed or Put Away 4   Remembering List of 4-5 Errands 3   Taking Care of Complicated Tasks 3   Applied Cognition Raw Score 22   Applied Cognition Standardized Score 47.83     Brian Lee

## 2023-09-28 NOTE — ASSESSMENT & PLAN NOTE
· Continue home Norvasc 5 mg daily, hydralazine 10 mg 3 times daily, losartan 100 mg daily and metoprolol tartrate 100 mg every 12 hours  · bp improved.    · Continue current treatment

## 2023-09-28 NOTE — PHYSICAL THERAPY NOTE
PHYSICAL THERAPY NOTE          Patient Name: Brian PISANO Date: 9/28/2023 09/28/23 1508   Note Type   Note Type Treatment   Pain Assessment   Pain Assessment Tool 0-10   Pain Score 8   Pain Location/Orientation Orientation: Bilateral;Location: Leg   Hospital Pain Intervention(s) Repositioned; Ambulation/increased activity; Elevated   Restrictions/Precautions   RLE Weight Bearing Per Order WBAT   LLE Weight Bearing Per Order WBAT   Braces or Orthoses Other (Comment)  (b/l surgical shoes)   Other Precautions Bed Alarm; Fall Risk;Pain;Multiple lines   General   Chart Reviewed Yes   Family/Caregiver Present Yes  (son)   Cognition   Overall Cognitive Status WFL   Arousal/Participation Alert; Responsive; Cooperative   Attention Within functional limits   Orientation Level Oriented X4   Memory Within functional limits   Following Commands Follows all commands and directions without difficulty   Subjective   Subjective pt  reports b/l le pain, pt agreeable to PT. Bed Mobility   Supine to Sit 5  Supervision   Additional items Assist x 1; Increased time required;HOB elevated   Transfers   Sit to Stand 3  Moderate assistance   Additional items Assist x 2; Increased time required;Verbal cues   Stand to Sit 4  Minimal assistance   Additional items Assist x 1; Armrests; Increased time required;Verbal cues   Additional Comments cues for hand placement and technique. assist to guide R hand back to chair. Ambulation/Elevation   Gait pattern Improper Weight shift; Poor UE support; Short stride; Excessively slow;Decreased heel strike;Decreased toe off;Decreased foot clearance   Gait Assistance 3  Moderate assist   Additional items Assist x 1;Verbal cues   Assistive Device Rolling walker   Distance 5' x1   Ambulation/Elevation Additional Comments unsteady gait, knees trembling,  no buckling ntoed.   B/l surgical shoes donned prior to gait training while seated at EOB   Balance   Static Sitting Good   Dynamic Sitting Fair +   Static Standing Poor +  (with Rw)   Dynamic Standing Poor  (w rw)   Ambulatory Poor  (w rw)   Endurance Deficit   Endurance Deficit Description fatigue, b/l le pain and generalized weakness   Activity Tolerance   Activity Tolerance Patient limited by pain; Patient limited by fatigue   Medical Staff Made Aware Richi ZULETA   Hip Flexion Sitting;15 reps;AROM; Bilateral   Hip Adduction Sitting;15 reps;AROM; Bilateral   Knee AROM Long Arc Quad Sitting;15 reps;AROM; Bilateral   Assessment   Prognosis Fair   Problem List Decreased strength;Decreased endurance; Impaired balance;Decreased mobility;Obesity; Decreased skin integrity;Pain   Assessment Pt seen for PT treatment session this date with interventions consisting of bed mobility, transfer training, gait training and HEP, and education provided as needed for safety and direction to improve functional mobility, safety awareness, and activity tolerance. Pt agreeable to PT treatment session upon arrival, pt found supine in bed. At end of session, pt left seated out of bed with all needs in reach. In comparison to previous session, pt with improvement in activity tolerance, endurance, standing balance, ambulation distances, ambulatory balance, b/l le strength, AM- pac score and functional mobility. Pt  Is showing progress toward goals with improvements as noted. Less assistance for bed mobility and transfers this session with progression to ambulating short distances to 5' x1 with mod assist x1 with use of Rw verbla cues for le sequencing and ue weightbearing techniques. Pt limited by pain, fatigue, and generalized weakness. Pt  Please refer to endurance deficit section of flowsheet for vitals. Continue to recommend IP rehab at time of d/c in order to maximize pt's functional independence and safety w/ mobility. Pt continues to be functioning below baseline level.  PT will continue to see pt while here in order to address the deficits listed above and provide interventions consistent w/ POC in effort to achieve STGs. The patient's AM-PAC Basic Mobility Inpatient Short Form Raw Score is 13. A raw score less than 16 suggests the patient may benefit from discharge to post-acute rehabilitation services. Please also refer to the recommendation of the Physical Therapist for safe discharge planning. Goals   Patient Goals TO get stronger   STG Expiration Date 10/09/23   PT Treatment Day 1   Plan   Treatment/Interventions Functional transfer training;LE strengthening/ROM; Therapeutic exercise; Endurance training;Patient/family training;Equipment eval/education; Bed mobility;Gait training;Spoke to nursing   Progress Slow progress, decreased activity tolerance   PT Frequency 2-3x/wk   Recommendation   PT Discharge Recommendation Post acute rehabilitation services   AM-PAC Basic Mobility Inpatient   Turning in Flat Bed Without Bedrails 3   Lying on Back to Sitting on Edge of Flat Bed Without Bedrails 3   Moving Bed to Chair 2   Standing Up From Chair Using Arms 2   Walk in Room 2   Climb 3-5 Stairs With Railing 1   Basic Mobility Inpatient Raw Score 13   Basic Mobility Standardized Score 33.99   Highest Level Of Mobility   JH-HLM Goal 4: Move to chair/commode   JH-HLM Achieved 5: Stand (1 or more minutes)   Education   Education Provided Home exercise program;Mobility training;Assistive device   Patient Demonstrates acceptance/verbal understanding   End of Consult   Patient Position at End of Consult Bedside chair;Bed/Chair alarm activated; All needs within reach   End of Consult Comments waffle cushion under pt for comfort.    Katie Denise, PTA

## 2023-09-28 NOTE — ASSESSMENT & PLAN NOTE
Lab Results   Component Value Date    EGFR 78 09/28/2023    EGFR 74 09/26/2023    EGFR 79 09/25/2023    CREATININE 0.80 09/28/2023    CREATININE 0.83 09/26/2023    CREATININE 0.79 09/25/2023   · POA creatinine 1.2  · Mildly elevated from baseline of 0.8-0.9, but did not meet strict criteria for acute kidney injury  · Patient was given IV fluids and creatinine normalized

## 2023-09-28 NOTE — ASSESSMENT & PLAN NOTE
· Admitted to [facility] at the end of August and discharge to short term rehab, afterwards patient went back to living unsheltered x since that time  · Patient reports that she has been trying to get into a senior apartment, but they are all full  · She reports difficulty finding a homeless shelter that will accommodate her wheelchair so she has been living outside  · Patient reports she tries to be compliant with medications, but it is sometimes difficult secondary to her situation  · Case management consulted: Assisting with discharge planning

## 2023-09-28 NOTE — PLAN OF CARE
Problem: PHYSICAL THERAPY ADULT  Goal: Performs mobility at highest level of function for planned discharge setting. See evaluation for individualized goals. Description: Treatment/Interventions: Functional transfer training, LE strengthening/ROM, Therapeutic exercise, Endurance training, Patient/family training, Bed mobility, Gait training, Spoke to nursing, OT          See flowsheet documentation for full assessment, interventions and recommendations. Outcome: Progressing  Note: Prognosis: Fair  Problem List: Decreased strength, Decreased endurance, Impaired balance, Decreased mobility, Obesity, Decreased skin integrity, Pain  Assessment: Pt seen for PT treatment session this date with interventions consisting of bed mobility, transfer training, gait training and HEP, and education provided as needed for safety and direction to improve functional mobility, safety awareness, and activity tolerance. Pt agreeable to PT treatment session upon arrival, pt found supine in bed. At end of session, pt left seated out of bed with all needs in reach. In comparison to previous session, pt with improvement in activity tolerance, endurance, standing balance, ambulation distances, ambulatory balance, b/l le strength, AM- pac score and functional mobility. Pt  Is showing progress toward goals with improvements as noted. Less assistance for bed mobility and transfers this session with progression to ambulating short distances to 5' x1 with mod assist x1 with use of Rw verbla cues for le sequencing and ue weightbearing techniques. Pt limited by pain, fatigue, and generalized weakness. Pt  Please refer to endurance deficit section of flowsheet for vitals. Continue to recommend IP rehab at time of d/c in order to maximize pt's functional independence and safety w/ mobility. Pt continues to be functioning below baseline level.  PT will continue to see pt while here in order to address the deficits listed above and provide interventions consistent w/ POC in effort to achieve STGs. The patient's AM-PAC Basic Mobility Inpatient Short Form Raw Score is 13. A raw score less than 16 suggests the patient may benefit from discharge to post-acute rehabilitation services. Please also refer to the recommendation of the Physical Therapist for safe discharge planning. Barriers to Discharge: Other (Comment)  Barriers to Discharge Comments: homelessness  PT Discharge Recommendation: Post acute rehabilitation services    See flowsheet documentation for full assessment.

## 2023-09-28 NOTE — PROGRESS NOTES
[facility]  Progress Note  Name: Payam Yanez  MRN: [id number]  Unit/Bed#: E5 -01 I Date of Admission: 9/23/2023   Date of Service: 9/28/2023 I Hospital Day: 4    Assessment/Plan   * Severe sepsis [facility])  Assessment & Plan  Pt is a 77-year-old female with past medical history significant for with PMHx of homelessness, legally blind, wheelchair bound, DM2, chronic diabetic foot wounds, CKD, and PE anticoagulation presented to the ED with complaints of bilateral lower extremity redness and swelling for the past 2 weeks    · Patient recently discharged from short term rehab about 2 weeks prior to this admission, after recent [facility] admission for COVID  · She had been living unsheltered since that time. She reports that her bandages on her feet had not been changed since she left the rehab  Patient presented to the ED fulfilling septic criteria with tachycardia and leukocytosis 16.10, and lactic acidosis  Septic source: Bilateral lower extremity cellulitis, and bilateral foot wounds with superimposed infection  CT bilateral lower extremities concerning for cellulitis   CXR with no acute cardiopulmonary abnormalities  UA without evidence of infection  Blood cultures negative thus far  Lactic acid 3.7->1.5  Patient was given IV fluid resuscitation, and empiric antibiotics and admitted  She continues to improve:  Afebrile, with improved leukocytosis  Currently treated with zosyn day 5- will continue iv antibiotics until discharged or completes 7 day course       Chronic anemia  Assessment & Plan  · Patient appears to have a chronic anemia over the past several years  · Baseline hemoglobin ranging 8-9  · Current hemoglobin at her baseline    Cellulitis of lower extremity  Assessment & Plan  · Patient presents with bilateral lower extremity erythema  · She had a CT scan of her right lower extremity that revealed, "Right lower extremity subcutaneous edema consistent with nonspecific cellulitis without evidence of drainable abscess collection. No soft tissue emphysema or osseous destructive change to indicate osteomyelitis"  · CT scan of left lower extremity that revealed, "Left lower extremity diffuse subcutaneous edema indicating nonspecific cellulitis without drainable abscess collection, soft tissue emphysema or osseous destructive change to indicate osteomyelitis"  · Blood cultures negative thus far  · MRSA nares swab pending  · Noted to have bilateral diabetic foot ulcers, locally infected with surrounding erythema and purulent drainage  · Continue antibiotics with Zosyn  · No history of MRSA: We will discontinue vancomycin  · Patient is clinically improved, afebrile, with improving leukocytosis  · Anticipate transitioning to oral antibiotics in the near future    Acute viral conjunctivitis of both eyes  Assessment & Plan  · Pt reports that she began with symptoms of eye discharge and erythema about 2-3 weeks ago, which has been progressively getting worse since that time. She reports that she tried using some eye drops she had around with no relief.   · Started on topical antibiotics with erythromycin, however suspect this is more likely a viral process  · No significant erythema or discharge  · Continue supportive measures and will discontinue antibiotics    Diabetic foot ulcer ([address])  Assessment & Plan  · Patient has a history of chronic diabetic foot ulcers  · Patient noted that the dressings on both of her legs had not been changed since he left short-term rehab greater than 2 weeks prior to this admission  · Patient was evaluated podiatry: Noted to have bilateral diabetic foot ulcers, clinically infected with surrounding erythema and purulent drainage  · CT scan without evidence of osteomyelitis  · Continue antibiotics with Zosyn clinically improving  · Per podiatry patient may be weightbearing as tolerated, continue offloading when nonambulatory  · Patient was reevaluated by podiatry on 9/26: Right foot plantar eschar removed, underlying wound granular/fibrotic  · Continue current antibiotics and local wound care  · Patient clinically and symptomatically improved    Pulmonary embolism ([address])  Assessment & Plan  · Last admitted 8/08-8/15 to [facility] for acute PE and lower extremity DVT  · Discharged on Eliquis, with compliance    Homelessness  Assessment & Plan  · Admitted to [facility] at the end of August and discharge to short term rehab, afterwards patient went back to living unsheltered x since that time  · Patient reports that she has been trying to get into a senior apartment, but they are all full  · She reports difficulty finding a homeless shelter that will accommodate her wheelchair so she has been living outside  · Patient reports she tries to be compliant with medications, but it is sometimes difficult secondary to her situation  · Case management consulted: Assisting with discharge planning    Calculus of gallbladder without cholecystitis without obstruction  Assessment & Plan  Noted on previous admission 4/23  No current symptoms  Outpatient serial imaging 4/24 recommended    Ambulatory dysfunction  Assessment & Plan  · Patient notes that since leaving short-term rehab she has remained in her wheelchair and not ambulated   · She notes she has become progressively weaker due to this   · PT/OT eval appreciated:   · Patient will be discharged to short-term rehab: Case management coordinating    Stage 3a chronic kidney disease [facility])  Assessment & Plan  Lab Results   Component Value Date    EGFR 78 09/28/2023    EGFR 74 09/26/2023    EGFR 79 09/25/2023    CREATININE 0.80 09/28/2023    CREATININE 0.83 09/26/2023    CREATININE 0.79 09/25/2023   · POA creatinine 1.2  · Mildly elevated from baseline of 0.8-0.9, but did not meet strict criteria for acute kidney injury  · Patient was given IV fluids and creatinine normalized      Hypertension  Assessment & Plan  · Continue home Norvasc 5 mg daily, hydralazine 10 mg 3 times daily, losartan 100 mg daily and metoprolol tartrate 100 mg every 12 hours  · bp improved. · Continue current treatment     PMR (polymyalgia rheumatica) ([facility])  Assessment & Plan  · Continue home regimen of prednisone 4 mg daily    Uncontrolled type 2 diabetes mellitus with hyperglycemia [facility])  Assessment & Plan  Lab Results   Component Value Date    HGBA1C 10.2 (H) 2023       Recent Labs     23  1623 23  2043 23  0723 23  1121   POCGLU 184* 220* 140 179*       Blood Sugar Average: Last 72 hrs:  (P) 171.5   · Continue home insulin regimen of 18 units of Lantus q12 hours with 5 units of lispro tid with meals  · Blood sugars adequately controlled  · SSI with accucheks            VTE Pharmacologic Prophylaxis: eliquis     Mechanical VTE Prophylaxis in Place: No    Education and Discussions with Family / Patient: son at bedside    Current Length of Stay: 4 day(s)  Current Patient Status: Inpatient     Discharge Plan / Estimated Discharge Date: awaiting str. Code Status: Level 1 - Full Code      Subjective:   Pt seen and examined. She reports pain in her legs with ambulating but no other problems. No f/c no cp no sob no n/v/d no abd pain     Objective:     Vitals:   Temp (24hrs), Av.4 °F (36.9 °C), Min:98 °F (36.7 °C), Max:99.1 °F (37.3 °C)    Temp:  [98 °F (36.7 °C)-99.1 °F (37.3 °C)] 98 °F (36.7 °C)  HR:  [57-68] 57  Resp:  [16-18] 18  BP: (148-155)/(60-70) 148/69  SpO2:  [96 %-99 %] 99 %  Body mass index is 40.59 kg/m². Input and Output Summary (last 24 hours): Intake/Output Summary (Last 24 hours) at 2023 1512  Last data filed at 2023 0100  Gross per 24 hour   Intake --   Output 600 ml   Net -600 ml       Physical Exam:   Physical Exam  Constitutional:       Appearance: Normal appearance. HENT:      Head: Normocephalic and atraumatic. Eyes:      Extraocular Movements: Extraocular movements intact.       Pupils: Pupils are equal, round, and reactive to light. Cardiovascular:      Rate and Rhythm: Normal rate and regular rhythm. Heart sounds: No murmur heard. No friction rub. No gallop. Pulmonary:      Effort: Pulmonary effort is normal. No respiratory distress. Breath sounds: Normal breath sounds. No wheezing, rhonchi or rales. Abdominal:      General: Bowel sounds are normal. There is no distension. Palpations: Abdomen is soft. Tenderness: There is no abdominal tenderness. There is no guarding or rebound. Musculoskeletal:      Right lower leg: No edema. Left lower leg: No edema. Skin:     Findings: No erythema. Neurological:      Mental Status: She is alert and oriented to person, place, and time. Additional Data:     Labs:  Results from last 7 days   Lab Units 09/28/23  0602   WBC Thousand/uL 12.83*   HEMOGLOBIN g/dL 8.6*   HEMATOCRIT % 29.3*   PLATELETS Thousands/uL 384   NEUTROS PCT % 57   LYMPHS PCT % 30   MONOS PCT % 7   EOS PCT % 3     Results from last 7 days   Lab Units 09/28/23  0602 09/25/23  0940 09/24/23  0830   SODIUM mmol/L 139   < > 134*   POTASSIUM mmol/L 3.5   < > 4.1   CHLORIDE mmol/L 109*   < > 105   CO2 mmol/L 24   < > 25   BUN mg/dL 13   < > 16   CREATININE mg/dL 0.80   < > 0.85   ANION GAP mmol/L 6   < > 4   CALCIUM mg/dL 8.5   < > 7.9*   ALBUMIN g/dL  --   --  2.5*   TOTAL BILIRUBIN mg/dL  --   --  0.46   ALK PHOS U/L  --   --  106*   ALT U/L  --   --  6*   AST U/L  --   --  8*   GLUCOSE RANDOM mg/dL 150*   < > 280*    < > = values in this interval not displayed.      Results from last 7 days   Lab Units 09/24/23  0026   INR  1.04     Results from last 7 days   Lab Units 09/28/23  1121 09/28/23  0723 09/27/23  2043 09/27/23  1623 09/27/23  1112 09/27/23  0720 09/26/23  2102 09/26/23  1544 09/26/23  1124 09/26/23  0702 09/25/23  2100 09/25/23  1628   POC GLUCOSE mg/dl 179* 140 220* 184* 205* 165* 190* 191* 125 123 128 197*         Results from last 7 days   Lab Units 09/25/23  0704 09/24/23  0830 09/24/23  0348 09/24/23  0026   LACTIC ACID mmol/L  --   --  1.5 3.7*   PROCALCITONIN ng/ml 0.05 0.07  --  0.16     Recent Cultures (last 7 days):     Results from last 7 days   Lab Units 09/24/23  0026   BLOOD CULTURE  No Growth After 4 Days. No Growth After 4 Days. Lines/Drains:  Invasive Devices     Peripheral Intravenous Line  Duration           Peripheral IV 09/27/23 Dorsal (posterior); Right Forearm 1 day              Last 24 Hours Medication List:   Current Facility-Administered Medications   Medication Dose Route Frequency Provider Last Rate   • acetaminophen  650 mg Oral Q6H PRN Gonzalo Pritchett PA-C     • aluminum-magnesium hydroxide-simethicone  30 mL Oral Q6H PRN Gonzalo Pritchett PA-C     • amLODIPine  5 mg Oral Daily Gonzalo Pritchett PA-C     • apixaban  5 mg Oral BID Gonzalo Pritchett PA-C     • atorvastatin  40 mg Oral Daily Gonzalo Pritchett PA-C     • erythromycin  0.5 inch Both Eyes Q6H [address] Gonzalo Pritchett PA-C     • fluticasone-vilanterol  1 puff Inhalation Daily Gonzalo Pritchett PA-C     • gabapentin  100 mg Oral BID Gonzalo Pritchett PA-C     • hydrALAZINE  10 mg Oral TID Gonzalo Pritchett PA-C     • insulin glargine  18 Units Subcutaneous Q12H [address] Gonzalo Pritchett PA-C     • insulin lispro  2-12 Units Subcutaneous TID [facility] Gonzalo Pritchett PA-C     • insulin lispro  2-12 Units Subcutaneous HS Gonzalo Pritchett PA-C     • insulin lispro  5 Units Subcutaneous TID With Meals Gonzalo Pritchett PA-C     • lidocaine  1 patch Topical Daily Gonzalo Pritchett PA-C     • losartan  100 mg Oral Daily Gonzalo Pritchett PA-C     • metoprolol tartrate  100 mg Oral Q12H [address] Gonzalo Pritchett PA-C     • multi-electrolyte  100 mL/hr Intravenous Continuous Gonzalo Pritchett PA-C 100 mL/hr (09/28/23 1420)   • nystatin   Topical BID Gonzalo Pritchett PA-C     • oxyCODONE  5 mg Oral Q6H PRN Gonzalo Pritchett PA-C     • oxyCODONE  2.5 mg Oral Q6H PRN Gonzalo Pritchett, MELVA     • piperacillin-tazobactam  3.375 g Intravenous Q6H Christa Nolan PA-C 3.375 g (09/28/23 1418)   • predniSONE  4 mg Oral Daily Christa Nolan PA-C     • topiramate  100 mg Oral HS Christa Nolan PA-C          Today, Patient Was Seen By: Dipak Storm DO

## 2023-09-29 LAB
BACTERIA BLD CULT: NORMAL
BACTERIA BLD CULT: NORMAL
GLUCOSE SERPL-MCNC: 142 MG/DL (ref 65–140)
GLUCOSE SERPL-MCNC: 173 MG/DL (ref 65–140)
GLUCOSE SERPL-MCNC: 174 MG/DL (ref 65–140)
GLUCOSE SERPL-MCNC: 179 MG/DL (ref 65–140)

## 2023-09-29 PROCEDURE — 99232 SBSQ HOSP IP/OBS MODERATE 35: CPT | Performed by: INTERNAL MEDICINE

## 2023-09-29 PROCEDURE — 82948 REAGENT STRIP/BLOOD GLUCOSE: CPT

## 2023-09-29 RX ORDER — LEVOFLOXACIN 750 MG/1
750 TABLET ORAL EVERY 24 HOURS
Status: DISCONTINUED | OUTPATIENT
Start: 2023-09-29 | End: 2023-09-30 | Stop reason: HOSPADM

## 2023-09-29 RX ADMIN — FLUTICASONE FUROATE AND VILANTEROL TRIFENATATE 1 PUFF: 200; 25 POWDER RESPIRATORY (INHALATION) at 09:12

## 2023-09-29 RX ADMIN — ERYTHROMYCIN 0.5 INCH: 5 OINTMENT OPHTHALMIC at 23:43

## 2023-09-29 RX ADMIN — INSULIN LISPRO 2 UNITS: 100 INJECTION, SOLUTION INTRAVENOUS; SUBCUTANEOUS at 16:37

## 2023-09-29 RX ADMIN — ERYTHROMYCIN 0.5 INCH: 5 OINTMENT OPHTHALMIC at 17:42

## 2023-09-29 RX ADMIN — PIPERACILLIN SODIUM AND TAZOBACTAM SODIUM 3.38 G: 36; 4.5 INJECTION, POWDER, LYOPHILIZED, FOR SOLUTION INTRAVENOUS at 09:17

## 2023-09-29 RX ADMIN — LOSARTAN POTASSIUM 100 MG: 50 TABLET, FILM COATED ORAL at 12:54

## 2023-09-29 RX ADMIN — APIXABAN 5 MG: 5 TABLET, FILM COATED ORAL at 09:11

## 2023-09-29 RX ADMIN — METOPROLOL TARTRATE 100 MG: 100 TABLET, FILM COATED ORAL at 09:11

## 2023-09-29 RX ADMIN — AMLODIPINE BESYLATE 5 MG: 5 TABLET ORAL at 09:11

## 2023-09-29 RX ADMIN — INSULIN LISPRO 2 UNITS: 100 INJECTION, SOLUTION INTRAVENOUS; SUBCUTANEOUS at 09:10

## 2023-09-29 RX ADMIN — LEVOFLOXACIN 750 MG: 750 TABLET, FILM COATED ORAL at 14:23

## 2023-09-29 RX ADMIN — PREDNISONE 4 MG: 1 TABLET ORAL at 09:11

## 2023-09-29 RX ADMIN — HYDRALAZINE HYDROCHLORIDE 10 MG: 10 TABLET, FILM COATED ORAL at 21:53

## 2023-09-29 RX ADMIN — TOPIRAMATE 100 MG: 100 TABLET, FILM COATED ORAL at 21:54

## 2023-09-29 RX ADMIN — ATORVASTATIN CALCIUM 40 MG: 40 TABLET, FILM COATED ORAL at 09:11

## 2023-09-29 RX ADMIN — PIPERACILLIN SODIUM AND TAZOBACTAM SODIUM 3.38 G: 36; 4.5 INJECTION, POWDER, LYOPHILIZED, FOR SOLUTION INTRAVENOUS at 02:09

## 2023-09-29 RX ADMIN — ERYTHROMYCIN 0.5 INCH: 5 OINTMENT OPHTHALMIC at 12:54

## 2023-09-29 RX ADMIN — ERYTHROMYCIN 0.5 INCH: 5 OINTMENT OPHTHALMIC at 00:30

## 2023-09-29 RX ADMIN — APIXABAN 5 MG: 5 TABLET, FILM COATED ORAL at 17:41

## 2023-09-29 RX ADMIN — INSULIN LISPRO 5 UNITS: 100 INJECTION, SOLUTION INTRAVENOUS; SUBCUTANEOUS at 16:37

## 2023-09-29 RX ADMIN — GABAPENTIN 100 MG: 100 CAPSULE ORAL at 17:41

## 2023-09-29 RX ADMIN — INSULIN GLARGINE 18 UNITS: 100 INJECTION, SOLUTION SUBCUTANEOUS at 21:53

## 2023-09-29 RX ADMIN — HYDRALAZINE HYDROCHLORIDE 10 MG: 10 TABLET, FILM COATED ORAL at 05:27

## 2023-09-29 RX ADMIN — INSULIN GLARGINE 18 UNITS: 100 INJECTION, SOLUTION SUBCUTANEOUS at 09:10

## 2023-09-29 RX ADMIN — INSULIN LISPRO 2 UNITS: 100 INJECTION, SOLUTION INTRAVENOUS; SUBCUTANEOUS at 12:55

## 2023-09-29 RX ADMIN — GABAPENTIN 100 MG: 100 CAPSULE ORAL at 09:11

## 2023-09-29 RX ADMIN — NYSTATIN: 100000 POWDER TOPICAL at 17:41

## 2023-09-29 RX ADMIN — INSULIN LISPRO 5 UNITS: 100 INJECTION, SOLUTION INTRAVENOUS; SUBCUTANEOUS at 12:55

## 2023-09-29 RX ADMIN — HYDRALAZINE HYDROCHLORIDE 10 MG: 10 TABLET, FILM COATED ORAL at 14:23

## 2023-09-29 RX ADMIN — ERYTHROMYCIN 0.5 INCH: 5 OINTMENT OPHTHALMIC at 05:27

## 2023-09-29 RX ADMIN — LIDOCAINE 1 PATCH: 700 PATCH TOPICAL at 09:12

## 2023-09-29 RX ADMIN — INSULIN LISPRO 5 UNITS: 100 INJECTION, SOLUTION INTRAVENOUS; SUBCUTANEOUS at 09:10

## 2023-09-29 RX ADMIN — METOPROLOL TARTRATE 100 MG: 100 TABLET, FILM COATED ORAL at 21:54

## 2023-09-29 RX ADMIN — NYSTATIN 1 APPLICATION: 100000 POWDER TOPICAL at 09:23

## 2023-09-29 NOTE — ASSESSMENT & PLAN NOTE
· Last admitted 8/08-8/15 to [facility] for acute PE and lower extremity DVT  · Discharged on Eliquis, with compliance

## 2023-09-29 NOTE — PLAN OF CARE
Problem: MOBILITY - ADULT  Goal: Maintain or return to baseline ADL function  Description: INTERVENTIONS:  -  Assess patient's ability to carry out ADLs; assess patient's baseline for ADL function and identify physical deficits which impact ability to perform ADLs (bathing, care of mouth/teeth, toileting, grooming, dressing, etc.)  - Assess/evaluate cause of self-care deficits   - Assess range of motion  - Assess patient's mobility; develop plan if impaired  - Assess patient's need for assistive devices and provide as appropriate  - Encourage maximum independence but intervene and supervise when necessary  - Involve family in performance of ADLs  - Assess for home care needs following discharge   - Consider OT consult to assist with ADL evaluation and planning for discharge  - Provide patient education as appropriate  Outcome: Progressing  Goal: Maintains/Returns to pre admission functional level  Description: INTERVENTIONS:  - Perform BMAT or MOVE assessment daily.   - Set and communicate daily mobility goal to care team and patient/family/caregiver. - Collaborate with rehabilitation services on mobility goals if consulted  - Perform Range of Motion 3 times a day. - Reposition patient every 2 hours.   - Dangle patient 3 times a day  - Stand patient 3 times a day  - Ambulate patient 3 times a day  - Out of bed to chair 3 times a day   - Out of bed for meals 3 times a day  - Out of bed for toileting  - Record patient progress and toleration of activity level   Outcome: Progressing     Problem: PAIN - ADULT  Goal: Verbalizes/displays adequate comfort level or baseline comfort level  Description: Interventions:  - Encourage patient to monitor pain and request assistance  - Assess pain using appropriate pain scale  - Administer analgesics based on type and severity of pain and evaluate response  - Implement non-pharmacological measures as appropriate and evaluate response  - Consider cultural and social influences on pain and pain management  - Notify physician/advanced practitioner if interventions unsuccessful or patient reports new pain  Outcome: Progressing     Problem: INFECTION - ADULT  Goal: Absence or prevention of progression during hospitalization  Description: INTERVENTIONS:  - Assess and monitor for signs and symptoms of infection  - Monitor lab/diagnostic results  - Monitor all insertion sites, i.e. indwelling lines, tubes, and drains  - Monitor endotracheal if appropriate and nasal secretions for changes in amount and color  - [place] appropriate cooling/warming therapies per order  - Administer medications as ordered  - Instruct and encourage patient and family to use good hand hygiene technique  - Identify and instruct in appropriate isolation precautions for identified infection/condition  Outcome: Progressing     Problem: SAFETY ADULT  Goal: Maintain or return to baseline ADL function  Description: INTERVENTIONS:  -  Assess patient's ability to carry out ADLs; assess patient's baseline for ADL function and identify physical deficits which impact ability to perform ADLs (bathing, care of mouth/teeth, toileting, grooming, dressing, etc.)  - Assess/evaluate cause of self-care deficits   - Assess range of motion  - Assess patient's mobility; develop plan if impaired  - Assess patient's need for assistive devices and provide as appropriate  - Encourage maximum independence but intervene and supervise when necessary  - Involve family in performance of ADLs  - Assess for home care needs following discharge   - Consider OT consult to assist with ADL evaluation and planning for discharge  - Provide patient education as appropriate  Outcome: Progressing  Goal: Maintains/Returns to pre admission functional level  Description: INTERVENTIONS:  - Perform BMAT or MOVE assessment daily.   - Set and communicate daily mobility goal to care team and patient/family/caregiver.    - Collaborate with rehabilitation services on mobility goals if consulted  - Perform Range of Motion 3 times a day. - Reposition patient every 2 hours.   - Dangle patient 3 times a day  - Stand patient 3 times a day  - Ambulate patient 3 times a day  - Out of bed to chair 3 times a day   - Out of bed for meals 3 times a day  - Out of bed for toileting  - Record patient progress and toleration of activity level   Outcome: Progressing  Goal: Patient will remain free of falls  Description: INTERVENTIONS:  - Educate patient/family on patient safety including physical limitations  - Instruct patient to call for assistance with activity   - Consult OT/PT to assist with strengthening/mobility   - Keep Call bell within reach  - Keep bed low and locked with side rails adjusted as appropriate  - Keep care items and personal belongings within reach  - Initiate and maintain comfort rounds  - Make Fall Risk Sign visible to staff  - Offer Toileting every 2 Hours, in advance of need  - Initiate/Maintain bed alarm  - Obtain necessary fall risk management equipment: bed alarm  - Apply yellow socks and bracelet for high fall risk patients  - Consider moving patient to room near nurses station  Outcome: Progressing     Problem: DISCHARGE PLANNING  Goal: Discharge to home or other facility with appropriate resources  Description: INTERVENTIONS:  - Identify barriers to discharge w/patient and caregiver  - Arrange for needed discharge resources and transportation as appropriate  - Identify discharge learning needs (meds, wound care, etc.)  - Arrange for interpretive services to assist at discharge as needed  - Refer to Case Management Department for coordinating discharge planning if the patient needs post-hospital services based on physician/advanced practitioner order or complex needs related to functional status, cognitive ability, or social support system  Outcome: Progressing     Problem: Knowledge Deficit  Goal: Patient/family/caregiver demonstrates understanding of disease process, treatment plan, medications, and discharge instructions  Description: Complete learning assessment and assess knowledge base.   Interventions:  - Provide teaching at level of understanding  - Provide teaching via preferred learning methods  Outcome: Progressing     Problem: CARDIOVASCULAR - ADULT  Goal: Maintains optimal cardiac output and hemodynamic stability  Description: INTERVENTIONS:  - Monitor I/O, vital signs and rhythm  - Monitor for S/S and trends of decreased cardiac output  - Administer and titrate ordered vasoactive medications to optimize hemodynamic stability  - Assess quality of pulses, skin color and temperature  - Assess for signs of decreased coronary artery perfusion  - Instruct patient to report change in severity of symptoms  Outcome: Progressing  Goal: Absence of cardiac dysrhythmias or at baseline rhythm  Description: INTERVENTIONS:  - Continuous cardiac monitoring, vital signs, obtain 12 lead EKG if ordered  - Administer antiarrhythmic and heart rate control medications as ordered  - Monitor electrolytes and administer replacement therapy as ordered  Outcome: Progressing     Problem: RESPIRATORY - ADULT  Goal: Achieves optimal ventilation and oxygenation  Description: INTERVENTIONS:  - Assess for changes in respiratory status  - Assess for changes in mentation and behavior  - Position to facilitate oxygenation and minimize respiratory effort  - Oxygen administered by appropriate delivery if ordered  - Initiate smoking cessation education as indicated  - Encourage broncho-pulmonary hygiene including cough, deep breathe, Incentive Spirometry  - Assess the need for suctioning and aspirate as needed  - Assess and instruct to report SOB or any respiratory difficulty  - Respiratory Therapy support as indicated  Outcome: Progressing     Problem: METABOLIC, FLUID AND ELECTROLYTES - ADULT  Goal: Glucose maintained within target range  Description: INTERVENTIONS:  - Monitor Blood Glucose as ordered  - Assess for signs and symptoms of hyperglycemia and hypoglycemia  - Administer ordered medications to maintain glucose within target range  - Assess nutritional intake and initiate nutrition service referral as needed  Outcome: Progressing     Problem: SKIN/TISSUE INTEGRITY - ADULT  Goal: Incision(s), wounds(s) or drain site(s) healing without S/S of infection  Description: INTERVENTIONS  - Assess and document dressing, incision, wound bed, drain sites and surrounding tissue  - Provide patient and family education  - Perform skin care/dressing changes   Outcome: Progressing     Problem: Prexisting or High Potential for Compromised Skin Integrity  Goal: Skin integrity is maintained or improved  Description: INTERVENTIONS:  - Identify patients at risk for skin breakdown  - Assess and monitor skin integrity  - Assess and monitor nutrition and hydration status  - Monitor labs   - Assess for incontinence   - Turn and reposition patient  - Assist with mobility/ambulation  - Relieve pressure over bony prominences  - Avoid friction and shearing  - Provide appropriate hygiene as needed including keeping skin clean and dry  - Evaluate need for skin moisturizer/barrier cream  - Collaborate with interdisciplinary team   - Patient/family teaching  - Consider wound care consult   Outcome: Progressing     Problem: Nutrition/Hydration-ADULT  Goal: Nutrient/Hydration intake appropriate for improving, restoring or maintaining nutritional needs  Description: Monitor and assess patient's nutrition/hydration status for malnutrition. Collaborate with interdisciplinary team and initiate plan and interventions as ordered. Monitor patient's weight and dietary intake as ordered or per policy. Utilize nutrition screening tool and intervene as necessary. Determine patient's food preferences and provide high-protein, high-caloric foods as appropriate.      INTERVENTIONS:  - Monitor oral intake, urinary output, labs, and treatment plans  - Assess nutrition and hydration status and recommend course of action  - Evaluate amount of meals eaten  - Assist patient with eating if necessary   - Allow adequate time for meals  - Recommend/ encourage appropriate diets, oral nutritional supplements, and vitamin/mineral supplements  - Order, calculate, and assess calorie counts as needed  - Recommend, monitor, and adjust tube feedings  based on assessed needs  - Assess need for intravenous fluids  - Provide  nutrition/hydration education as appropriate  - Include patient/family/caregiver in decisions related to nutrition  Outcome: Progressing

## 2023-09-29 NOTE — CASE MANAGEMENT
Case Management Discharge Planning Note    Patient name Ltety Goff  [facility]  /E5 MS 12-* MRN [id number]  : 1960 Date 2023       Current Admission Date: 2023  Current Admission Diagnosis:Severe sepsis [facility])   Patient Active Problem List    Diagnosis Date Noted   • Cellulitis of lower extremity 2023   • Chronic anemia 2023   • Acute viral conjunctivitis of both eyes 2023   • Pulmonary embolism ([address]) 2023   • Diabetic foot ulcer ([address]) 2023   • Ulcer of left heel ([address]) 2023   • Homelessness 2023   • Encounter for support and coordination of transition of care 2023   • Food insecurity 2023   • Leukocytosis 2023   • Friction blisters of the soles 2023   • Calculus of gallbladder without cholecystitis without obstruction 2023   • Severe sepsis ([address]) 2023   • Diabetes mellitus ([address]) 2023   • Abnormal CT scan 2023   • Suspected pressure injury of deep tissue 03/15/2023   • Dyslipidemia 2023   • Insulin long-term use ([address]) 2023   • Type 2 diabetes mellitus with hyperglycemia ([address]) 2023   • Ambulatory dysfunction 2023   • Legally blind 2023   • Diarrhea 2023   • Bilateral lower extremity edema 2023   • Unsheltered homelessness 2023   • Abnormal urinalysis 2023   • Weakness 2023   • Intertrigo 2023   • Leg numbness 2023   • Unsteadiness on feet 2022   • Obstructive sleep apnea 2022   • Diabetic neuropathy ([address]) 2022   • Stage 3a chronic kidney disease ([address]) 2022   • Polymyalgia rheumatica ([address]) 2021   • Gait instability 2021   • Ulnar neuropathy of right upper extremity    • Blurry vision, bilateral 2021   • Depressed mood 10/08/2020   • Hypertension 05/10/2020   • Chronic migraine without aura without status migrainosus, not intractable 2020   • Hypersomnia 2020   • Cerebral Patient over due for thyroid labs  Last labs were in January 2021 and He still has not gotten labs done I ordered earlier this year     Please call him and let him know  I will refill for another 60 days but  he will need to get labs done for refills after that  Thank you  aneurysm without rupture 04/06/2020   • History of absence seizures 03/25/2020   • Thyroid nodule 03/06/2020   • Aneurysm ([address]) 03/05/2020   • Type 2 diabetes mellitus with hyperglycemia, with long-term current use of insulin ([address]) 02/21/2020   • Left cavernous carotid aneurysm 02/10/2020   • Polyneuropathy associated with underlying disease ([address]) 01/07/2020   • PMR (polymyalgia rheumatica) ([address]) 01/07/2020   • Thrombocytosis 01/07/2020   • Morbid obesity ([address]) 09/19/2019   • Other inflammatory and immune myopathies, not elsewhere classified 09/16/2019   • Type 2 diabetes mellitus with microalbuminuria, with long-term current use of insulin ([address]) 02/01/2019   • Mild intermittent asthma without complication 44/46/5244   • Orthostatic hypotension 06/25/2018   • Lung nodules 03/22/2018   • Exertional dyspnea 02/13/2018   • Enlarged pulmonary artery ([address]) 02/13/2018   • Aortic dilatation (HCC) 02/13/2018   • Uncontrolled type 2 diabetes mellitus with hyperglycemia (HCC)    • Seizures ([address])    • QT prolongation 12/18/2017   • Decreased pulses in feet 12/11/2017   • Microalbuminuria 09/08/2015   • Vitamin D deficiency 06/06/2014   • Visual impairment in both eyes 12/06/2012   • Anemia 03/20/2012   • Hyperlipidemia 03/20/2012   • Neuropathy of hand, right 03/20/2012      LOS (days): 5  Geometric Mean LOS (GMLOS) (days): 3.50  Days to GMLOS:-1.7     OBJECTIVE:  Risk of Unplanned Readmission Score: 41.73         Current admission status: Inpatient   Preferred Pharmacy:   [facility] [address] [address]  Phone: [phone] Fax: [address] [place] [address] [address] [facility],  [facility],  [address] [place] [address]  Phone: [phone] Fax: [phone]    Primary Care Provider: Emiliano Bowens MD    Primary Insurance: [facility] [address] [facility]  Secondary Insurance: [address] [employer]    DISCHARGE DETAILS:    Discharge planning discussed with[de-identified] Pt and pt's son  Freedom of Choice: Yes  Comments - Freedom of Choice: Pt asked for decision on available facilities. Pt refusing all offers, citing "too far" as the reason. Pt and pt's son aware that there are no closer facilities availabe for care at this time. CM contacted family/caregiver?: Yes (Pt's son at bedside.)  Were Treatment Team discharge recommendations reviewed with patient/caregiver?: Yes  Did patient/caregiver verbalize understanding of patient care needs?: Yes  Were patient/caregiver advised of the risks associated with not following Treatment Team discharge recommendations?: Yes                   Other Referral/Resources/Interventions Provided:  Interventions: Short Term Rehab  Referral Comments: Awaiting Deanna response, message sent to same by  for available/unavilable. Treatment Team Recommendation: Short Term Rehab            [id number] - CM spoke with pt who accepts offer for [facility]. Auth information sent to [facility] Discharge Support: [place]  is [phone]; MD will be Melia Chavez; NPI is [id number]         [address] [place] [address] uploaded to [employer] at request of accepting facility. [address] contact info updated in [id number] [facility] 86.

## 2023-09-29 NOTE — PLAN OF CARE
Problem: MOBILITY - ADULT  Goal: Maintain or return to baseline ADL function  Description: INTERVENTIONS:  -  Assess patient's ability to carry out ADLs; assess patient's baseline for ADL function and identify physical deficits which impact ability to perform ADLs (bathing, care of mouth/teeth, toileting, grooming, dressing, etc.)  - Assess/evaluate cause of self-care deficits   - Assess range of motion  - Assess patient's mobility; develop plan if impaired  - Assess patient's need for assistive devices and provide as appropriate  - Encourage maximum independence but intervene and supervise when necessary  - Involve family in performance of ADLs  - Assess for home care needs following discharge   - Consider OT consult to assist with ADL evaluation and planning for discharge  - Provide patient education as appropriate  Outcome: Progressing  Goal: Maintains/Returns to pre admission functional level  Description: INTERVENTIONS:  - Perform BMAT or MOVE assessment daily.   - Set and communicate daily mobility goal to care team and patient/family/caregiver. - Collaborate with rehabilitation services on mobility goals if consulted  - Perform Range of Motion 3 times a day. - Reposition patient every 2 hours.   - Dangle patient 3 times a day  - Stand patient 3 times a day  - Ambulate patient 3 times a day  - Out of bed to chair 3 times a day   - Out of bed for meals 3 times a day  - Out of bed for toileting  - Record patient progress and toleration of activity level   Outcome: Progressing     Problem: PAIN - ADULT  Goal: Verbalizes/displays adequate comfort level or baseline comfort level  Description: Interventions:  - Encourage patient to monitor pain and request assistance  - Assess pain using appropriate pain scale  - Administer analgesics based on type and severity of pain and evaluate response  - Implement non-pharmacological measures as appropriate and evaluate response  - Consider cultural and social influences on pain and pain management  - Notify physician/advanced practitioner if interventions unsuccessful or patient reports new pain  Outcome: Progressing     Problem: INFECTION - ADULT  Goal: Absence or prevention of progression during hospitalization  Description: INTERVENTIONS:  - Assess and monitor for signs and symptoms of infection  - Monitor lab/diagnostic results  - Monitor all insertion sites, i.e. indwelling lines, tubes, and drains  - Monitor endotracheal if appropriate and nasal secretions for changes in amount and color  - [place] appropriate cooling/warming therapies per order  - Administer medications as ordered  - Instruct and encourage patient and family to use good hand hygiene technique  - Identify and instruct in appropriate isolation precautions for identified infection/condition  Outcome: Progressing     Problem: SAFETY ADULT  Goal: Maintain or return to baseline ADL function  Description: INTERVENTIONS:  -  Assess patient's ability to carry out ADLs; assess patient's baseline for ADL function and identify physical deficits which impact ability to perform ADLs (bathing, care of mouth/teeth, toileting, grooming, dressing, etc.)  - Assess/evaluate cause of self-care deficits   - Assess range of motion  - Assess patient's mobility; develop plan if impaired  - Assess patient's need for assistive devices and provide as appropriate  - Encourage maximum independence but intervene and supervise when necessary  - Involve family in performance of ADLs  - Assess for home care needs following discharge   - Consider OT consult to assist with ADL evaluation and planning for discharge  - Provide patient education as appropriate  Outcome: Progressing  Goal: Maintains/Returns to pre admission functional level  Description: INTERVENTIONS:  - Perform BMAT or MOVE assessment daily.   - Set and communicate daily mobility goal to care team and patient/family/caregiver.    - Collaborate with rehabilitation services on mobility goals if consulted  - Out of bed for toileting  - Record patient progress and toleration of activity level   Outcome: Progressing  Goal: Patient will remain free of falls  Description: INTERVENTIONS:  - Educate patient/family on patient safety including physical limitations  - Instruct patient to call for assistance with activity   - Consult OT/PT to assist with strengthening/mobility   - Keep Call bell within reach  - Keep bed low and locked with side rails adjusted as appropriate  - Keep care items and personal belongings within reach  - Initiate and maintain comfort rounds  - Make Fall Risk Sign visible to staff  - Offer Toileting every 2 Hours, in advance of need  - Initiate/Maintain bed alarm  - Obtain necessary fall risk management equipment: call bell in reach and nonskid footwear on  - Apply yellow socks and bracelet for high fall risk patients  - Consider moving patient to room near nurses station  Outcome: Progressing     Problem: DISCHARGE PLANNING  Goal: Discharge to home or other facility with appropriate resources  Description: INTERVENTIONS:  - Identify barriers to discharge w/patient and caregiver  - Arrange for needed discharge resources and transportation as appropriate  - Identify discharge learning needs (meds, wound care, etc.)  - Arrange for interpretive services to assist at discharge as needed  - Refer to Case Management Department for coordinating discharge planning if the patient needs post-hospital services based on physician/advanced practitioner order or complex needs related to functional status, cognitive ability, or social support system  Outcome: Progressing     Problem: Knowledge Deficit  Goal: Patient/family/caregiver demonstrates understanding of disease process, treatment plan, medications, and discharge instructions  Description: Complete learning assessment and assess knowledge base.   Interventions:  - Provide teaching at level of understanding  - Provide teaching via preferred learning methods  Outcome: Progressing     Problem: CARDIOVASCULAR - ADULT  Goal: Maintains optimal cardiac output and hemodynamic stability  Description: INTERVENTIONS:  - Monitor I/O, vital signs and rhythm  - Monitor for S/S and trends of decreased cardiac output  - Administer and titrate ordered vasoactive medications to optimize hemodynamic stability  - Assess quality of pulses, skin color and temperature  - Assess for signs of decreased coronary artery perfusion  - Instruct patient to report change in severity of symptoms  Outcome: Progressing  Goal: Absence of cardiac dysrhythmias or at baseline rhythm  Description: INTERVENTIONS:  - Continuous cardiac monitoring, vital signs, obtain 12 lead EKG if ordered  - Administer antiarrhythmic and heart rate control medications as ordered  - Monitor electrolytes and administer replacement therapy as ordered  Outcome: Progressing     Problem: RESPIRATORY - ADULT  Goal: Achieves optimal ventilation and oxygenation  Description: INTERVENTIONS:  - Assess for changes in respiratory status  - Assess for changes in mentation and behavior  - Position to facilitate oxygenation and minimize respiratory effort  - Oxygen administered by appropriate delivery if ordered  - Initiate smoking cessation education as indicated  - Encourage broncho-pulmonary hygiene including cough, deep breathe, Incentive Spirometry  - Assess the need for suctioning and aspirate as needed  - Assess and instruct to report SOB or any respiratory difficulty  - Respiratory Therapy support as indicated  Outcome: Progressing     Problem: METABOLIC, FLUID AND ELECTROLYTES - ADULT  Goal: Glucose maintained within target range  Description: INTERVENTIONS:  - Monitor Blood Glucose as ordered  - Assess for signs and symptoms of hyperglycemia and hypoglycemia  - Administer ordered medications to maintain glucose within target range  - Assess nutritional intake and initiate nutrition service referral as needed  Outcome: Progressing     Problem: SKIN/TISSUE INTEGRITY - ADULT  Goal: Incision(s), wounds(s) or drain site(s) healing without S/S of infection  Description: INTERVENTIONS  - Assess and document dressing, incision, wound bed, drain sites and surrounding tissue  - Provide patient and family education  - Perform skin care/dressing changes every day   Outcome: Progressing     Problem: Prexisting or High Potential for Compromised Skin Integrity  Goal: Skin integrity is maintained or improved  Description: INTERVENTIONS:  - Identify patients at risk for skin breakdown  - Assess and monitor skin integrity  - Assess and monitor nutrition and hydration status  - Monitor labs   - Assess for incontinence   - Turn and reposition patient  - Assist with mobility/ambulation  - Relieve pressure over bony prominences  - Avoid friction and shearing  - Provide appropriate hygiene as needed including keeping skin clean and dry  - Evaluate need for skin moisturizer/barrier cream  - Collaborate with interdisciplinary team   - Patient/family teaching  - Consider wound care consult   Outcome: Progressing     Problem: Nutrition/Hydration-ADULT  Goal: Nutrient/Hydration intake appropriate for improving, restoring or maintaining nutritional needs  Description: Monitor and assess patient's nutrition/hydration status for malnutrition. Collaborate with interdisciplinary team and initiate plan and interventions as ordered. Monitor patient's weight and dietary intake as ordered or per policy. Utilize nutrition screening tool and intervene as necessary. Determine patient's food preferences and provide high-protein, high-caloric foods as appropriate.      INTERVENTIONS:  - Monitor oral intake, urinary output, labs, and treatment plans  - Assess nutrition and hydration status and recommend course of action  - Evaluate amount of meals eaten  - Assist patient with eating if necessary   - Allow adequate time for meals  - Recommend/ encourage appropriate diets, oral nutritional supplements, and vitamin/mineral supplements  - Order, calculate, and assess calorie counts as needed  - Recommend, monitor, and adjust tube feedings  based on assessed needs  - Assess need for intravenous fluids  - Provide  nutrition/hydration education as appropriate  - Include patient/family/caregiver in decisions related to nutrition  Outcome: Progressing

## 2023-09-29 NOTE — CASE MANAGEMENT
[address] received request for authorization from Care Manager.   Authorization request for: SNF  Facility Name: [facility] NPI: [id number]      Facility MD: Leo Oneill  NPI: [id number]     Authorization initiated by contacting insurance: [facility] Via: [facility] Portal    Pending Reference #: [id number]   Clinicals submitted via: [facility] Portal

## 2023-09-29 NOTE — ASSESSMENT & PLAN NOTE
· Admitted to [facility] at the end of August and discharge to short term rehab, afterwards patient went back to living unsheltered x since that time  · Patient reports that she has been trying to get into a senior apartment, but they are all full  · She reports difficulty finding a homeless shelter that will accommodate her wheelchair so she has been living outside  · Patient reports she tries to be compliant with medications, but it is sometimes difficult secondary to her situation  · Case management consulted: Assisting with discharge planning- awaiting for str and auth

## 2023-09-29 NOTE — PROGRESS NOTES
[facility]  Progress Note  Name: Marko Correa  MRN: [id number]  Unit/Bed#: E5 -01 I Date of Admission: 9/23/2023   Date of Service: 9/29/2023 I Hospital Day: 5    Assessment/Plan   * Severe sepsis [facility])  Assessment & Plan  Pt is a 75-year-old female with past medical history significant for with PMHx of homelessness, legally blind, wheelchair bound, DM2, chronic diabetic foot wounds, CKD, and PE anticoagulation presented to the ED with complaints of bilateral lower extremity redness and swelling for the past 2 weeks    · Patient recently discharged from short term rehab about 2 weeks prior to this admission, after recent [facility] admission for COVID  · She had been living unsheltered since that time. She reports that her bandages on her feet had not been changed since she left the rehab  Patient presented to the ED fulfilling septic criteria with tachycardia and leukocytosis 16.10, and lactic acidosis  Septic source: Bilateral lower extremity cellulitis, and bilateral foot wounds with superimposed infection  CT bilateral lower extremities concerning for cellulitis   CXR with no acute cardiopulmonary abnormalities  UA without evidence of infection  Blood cultures negative thus far  Lactic acid 3.7->1.5  Patient was given IV fluid resuscitation, and empiric antibiotics and admitted  She continues to improve:  Afebrile, with improved leukocytosis  Currently treated with zosyn day 6- change to levaquin for 2 more days       Chronic anemia  Assessment & Plan  · Patient appears to have a chronic anemia over the past several years  · Baseline hemoglobin ranging 8-9  · Current hemoglobin at her baseline    Cellulitis of lower extremity  Assessment & Plan  · Patient presents with bilateral lower extremity erythema  · She had a CT scan of her right lower extremity that revealed, "Right lower extremity subcutaneous edema consistent with nonspecific cellulitis without evidence of drainable abscess collection. No soft tissue emphysema or osseous destructive change to indicate osteomyelitis"  · CT scan of left lower extremity that revealed, "Left lower extremity diffuse subcutaneous edema indicating nonspecific cellulitis without drainable abscess collection, soft tissue emphysema or osseous destructive change to indicate osteomyelitis"  · Blood cultures negative  · MRSA nares swab negative   · Noted to have bilateral diabetic foot ulcers, locally infected with surrounding erythema and purulent drainage  · Continue antibiotics with Zosyn- but change to levaquin to complete course   · No history of MRSA: We will discontinue vancomycin  · Patient is clinically improved, afebrile, with improving leukocytosis    Acute viral conjunctivitis of both eyes  Assessment & Plan  · Pt reports that she began with symptoms of eye discharge and erythema about 2-3 weeks ago, which has been progressively getting worse since that time. She reports that she tried using some eye drops she had around with no relief.   · Started on topical antibiotics with erythromycin, however suspect this is more likely a viral process  · No significant erythema or discharge  · Continue supportive measures and will discontinue antibiotics    Diabetic foot ulcer ([address])  Assessment & Plan  · Patient has a history of chronic diabetic foot ulcers  · Patient noted that the dressings on both of her legs had not been changed since he left short-term rehab greater than 2 weeks prior to this admission  · Patient was evaluated podiatry: Noted to have bilateral diabetic foot ulcers, clinically infected with surrounding erythema and purulent drainage  · CT scan without evidence of osteomyelitis  · Continue antibiotics with Zosyn clinically improving  · Per podiatry patient may be weightbearing as tolerated, continue offloading when nonambulatory  · Patient was reevaluated by podiatry on 9/26: Right foot plantar eschar removed, underlying wound granular/fibrotic  · Continue current antibiotics and local wound care  · Patient clinically and symptomatically improved    Pulmonary embolism ([address])  Assessment & Plan  · Last admitted 8/08-8/15 to [facility] for acute PE and lower extremity DVT  · Discharged on Eliquis, with compliance    Homelessness  Assessment & Plan  · Admitted to [facility] at the end of August and discharge to short term rehab, afterwards patient went back to living unsheltered x since that time  · Patient reports that she has been trying to get into a senior apartment, but they are all full  · She reports difficulty finding a homeless shelter that will accommodate her wheelchair so she has been living outside  · Patient reports she tries to be compliant with medications, but it is sometimes difficult secondary to her situation  · Case management consulted: Assisting with discharge planning- awaiting for str and auth     Calculus of gallbladder without cholecystitis without obstruction  Assessment & Plan  Noted on previous admission 4/23  No current symptoms  Outpatient serial imaging 4/24 recommended    Ambulatory dysfunction  Assessment & Plan  · Patient notes that since leaving short-term rehab she has remained in her wheelchair and not ambulated   · She notes she has become progressively weaker due to this   · PT/OT eval appreciated:   · Patient will be discharged to short-term rehab: awaiting for auth    Stage 3a chronic kidney disease [facility])  Assessment & Plan  Lab Results   Component Value Date    EGFR 78 09/28/2023    EGFR 74 09/26/2023    EGFR 79 09/25/2023    CREATININE 0.80 09/28/2023    CREATININE 0.83 09/26/2023    CREATININE 0.79 09/25/2023   · POA creatinine 1.2  · Mildly elevated from baseline of 0.8-0.9, but did not meet strict criteria for acute kidney injury  · Patient was given IV fluids and creatinine normalized      Hypertension  Assessment & Plan  · Continue home Norvasc 5 mg daily, hydralazine 10 mg 3 times daily, losartan 100 mg daily and metoprolol tartrate 100 mg every 12 hours  · bp improved. · Continue current treatment     PMR (polymyalgia rheumatica) ([facility])  Assessment & Plan  · Continue home regimen of prednisone 4 mg daily    Uncontrolled type 2 diabetes mellitus with hyperglycemia [facility])  Assessment & Plan  Lab Results   Component Value Date    HGBA1C 10.2 (H) 2023       Recent Labs     23  1559 23  2034 23  0728 23  1122   POCGLU 172* 252* 173* 179*       Blood Sugar Average: Last 72 hrs:  (P) [id number]   · Continue home insulin regimen of 18 units of Lantus q12 hours with 5 units of lispro tid with meals  · Blood sugars adequately controlled  · SSI with accucheks            VTE Pharmacologic Prophylaxis: eliquis     Mechanical VTE Prophylaxis in Place: No    Education and Discussions with Family / Patient: son at bedside    Current Length of Stay: 5 day(s)  Current Patient Status: Inpatient     Discharge Plan / Estimated Discharge Date:awaiting auth for str. Code Status: Level 1 - Full Code      Subjective:   Pt seen and examined. She denies f/c no cp no sob no n/v/d no abd pain. Her pain in her legs is improving. Objective:     Vitals:   Temp (24hrs), Av.5 °F (36.9 °C), Min:98.4 °F (36.9 °C), Max:98.6 °F (37 °C)    Temp:  [98.4 °F (36.9 °C)-98.6 °F (37 °C)] 98.6 °F (37 °C)  HR:  [54-66] 57  Resp:  [16-18] 16  BP: (130-175)/(61-84) 138/70  SpO2:  [93 %-98 %] 93 %  Body mass index is 40.59 kg/m². Input and Output Summary (last 24 hours): Intake/Output Summary (Last 24 hours) at 2023 1530  Last data filed at 2023 2141  Gross per 24 hour   Intake 960 ml   Output 1700 ml   Net -740 ml       Physical Exam:   Physical Exam  Constitutional:       Appearance: Normal appearance. HENT:      Head: Normocephalic and atraumatic. Eyes:      Extraocular Movements: Extraocular movements intact. Pupils: Pupils are equal, round, and reactive to light. Cardiovascular:      Rate and Rhythm: Normal rate and regular rhythm. Heart sounds: No murmur heard. No friction rub. No gallop. Pulmonary:      Effort: Pulmonary effort is normal. No respiratory distress. Breath sounds: Normal breath sounds. No wheezing, rhonchi or rales. Abdominal:      General: Bowel sounds are normal. There is no distension. Palpations: Abdomen is soft. Tenderness: There is no abdominal tenderness. There is no guarding or rebound. Musculoskeletal:      Right lower leg: No edema. Left lower leg: No edema. Skin:     Findings: No erythema. Neurological:      Mental Status: She is alert and oriented to person, place, and time. Additional Data:     Labs:  Results from last 7 days   Lab Units 09/28/23  0602   WBC Thousand/uL 12.83*   HEMOGLOBIN g/dL 8.6*   HEMATOCRIT % 29.3*   PLATELETS Thousands/uL 384   NEUTROS PCT % 57   LYMPHS PCT % 30   MONOS PCT % 7   EOS PCT % 3     Results from last 7 days   Lab Units 09/28/23  0602 09/25/23  0940 09/24/23  0830   SODIUM mmol/L 139   < > 134*   POTASSIUM mmol/L 3.5   < > 4.1   CHLORIDE mmol/L 109*   < > 105   CO2 mmol/L 24   < > 25   BUN mg/dL 13   < > 16   CREATININE mg/dL 0.80   < > 0.85   ANION GAP mmol/L 6   < > 4   CALCIUM mg/dL 8.5   < > 7.9*   ALBUMIN g/dL  --   --  2.5*   TOTAL BILIRUBIN mg/dL  --   --  0.46   ALK PHOS U/L  --   --  106*   ALT U/L  --   --  6*   AST U/L  --   --  8*   GLUCOSE RANDOM mg/dL 150*   < > 280*    < > = values in this interval not displayed.      Results from last 7 days   Lab Units 09/24/23  0026   INR  1.04     Results from last 7 days   Lab Units 09/29/23  1122 09/29/23  0728 09/28/23  2034 09/28/23  1559 09/28/23  1121 09/28/23  0723 09/27/23  2043 09/27/23  1623 09/27/23  1112 09/27/23  0720 09/26/23  2102 09/26/23  1544   POC GLUCOSE mg/dl 179* 173* 252* 172* 179* 140 220* 184* 205* 165* 190* 191*         Results from last 7 days   Lab Units 09/25/23  0704 09/24/23  0867 09/24/23  0348 09/24/23  0026   LACTIC ACID mmol/L  --   --  1.5 3.7*   PROCALCITONIN ng/ml 0.05 0.07  --  0.16     Recent Cultures (last 7 days):     Results from last 7 days   Lab Units 09/24/23  0026   BLOOD CULTURE  No Growth After 5 Days. No Growth After 5 Days. Lines/Drains:  Invasive Devices     Peripheral Intravenous Line  Duration           Peripheral IV 09/27/23 Dorsal (posterior); Right Forearm 2 days              Last 24 Hours Medication List:   Current Facility-Administered Medications   Medication Dose Route Frequency Provider Last Rate   • acetaminophen  650 mg Oral Q6H PRN Garcia FootMARTITA varela-C     • aluminum-magnesium hydroxide-simethicone  30 mL Oral Q6H PRN Garcia FootVENKAT varelaC     • amLODIPine  5 mg Oral Daily Garcia FootVENKAT varelaC     • apixaban  5 mg Oral BID Garcia Footavery PA-C     • atorvastatin  40 mg Oral Daily Garcia Footavery PA-C     • erythromycin  0.5 inch Both Eyes Q6H [facility] FootVENKAT varelaC     • fluticasone-vilanterol  1 puff Inhalation Daily Garcia VENKAT RuizC     • gabapentin  100 mg Oral BID Garcia FootVENKAT varelaC     • hydrALAZINE  5 mg Intravenous Q6H PRN Johnny Parry DO     • hydrALAZINE  10 mg Oral TID CONCETTA Diallo     • insulin glargine  18 Units Subcutaneous Q12H [facility] VENKAT RuizC     • insulin lispro  2-12 Units Subcutaneous TID [facility] Garcia FootVENKAT varelaC     • insulin lispro  2-12 Units Subcutaneous HS Garcia FootVENKAT varelaC     • insulin lispro  5 Units Subcutaneous TID With Meals Garcia VENKAT RuizC     • levofloxacin  750 mg Oral Q24H Lori Voss DO     • lidocaine  1 patch Topical Daily Garcia FootVENKAT varelaC     • losartan  100 mg Oral Daily With [address], CRNP     • metoprolol tartrate  100 mg Oral Q12H [facility] FootVENKAT varelaC     • nystatin   Topical BID Garcia FootVENKAT varelaC     • oxyCODONE  5 mg Oral Q6H PRN Garcia Ruiz PA-C     • oxyCODONE  2.5 mg Oral Q6H PRN Garcia Ruiz PA-C • predniSONE  4 mg Oral Daily Christa Nolan PA-C     • topiramate  100 mg Oral HS Christa Nolan PA-C          Today, Patient Was Seen By: Dipak Storm,

## 2023-09-29 NOTE — ASSESSMENT & PLAN NOTE
Pt is a 26-year-old female with past medical history significant for with PMHx of homelessness, legally blind, wheelchair bound, DM2, chronic diabetic foot wounds, CKD, and PE anticoagulation presented to the ED with complaints of bilateral lower extremity redness and swelling for the past 2 weeks    · Patient recently discharged from short term rehab about 2 weeks prior to this admission, after recent [facility] admission for COVID  · She had been living unsheltered since that time. She reports that her bandages on her feet had not been changed since she left the rehab  Patient presented to the ED fulfilling septic criteria with tachycardia and leukocytosis 16.10, and lactic acidosis  Septic source: Bilateral lower extremity cellulitis, and bilateral foot wounds with superimposed infection  CT bilateral lower extremities concerning for cellulitis   CXR with no acute cardiopulmonary abnormalities  UA without evidence of infection  Blood cultures negative thus far  Lactic acid 3.7->1.5  Patient was given IV fluid resuscitation, and empiric antibiotics and admitted  She continues to improve:  Afebrile, with improved leukocytosis  Currently treated with zosyn day 6- change to levaquin for 2 more days

## 2023-09-29 NOTE — ASSESSMENT & PLAN NOTE
Lab Results   Component Value Date    HGBA1C 10.2 (H) 08/24/2023       Recent Labs     09/28/23  1559 09/28/23 2034 09/29/23  0728 09/29/23  1122   POCGLU 172* 252* 173* 179*       Blood Sugar Average: Last 72 hrs:  (P) [id number]   · Continue home insulin regimen of 18 units of Lantus q12 hours with 5 units of lispro tid with meals  · Blood sugars adequately controlled  · SSI with accucheks

## 2023-09-29 NOTE — ASSESSMENT & PLAN NOTE
----- Message from Shanda Stern sent at 1/30/2023 12:04 PM EST -----  Subject: Referral Request    Reason for referral request? gastro - existing problem that she has seen   Mya Howell about already   Provider patient wants to be referred to(if known):     Provider Phone Number(if known):     Additional Information for Provider?   ---------------------------------------------------------------------------  --------------  [id number]    [phone]; OK to leave message on voicemail  ---------------------------------------------------------------------------  -------------- · Patient appears to have a chronic anemia over the past several years  · Baseline hemoglobin ranging 8-9  · Current hemoglobin at her baseline

## 2023-09-29 NOTE — ASSESSMENT & PLAN NOTE
· Patient presents with bilateral lower extremity erythema  · She had a CT scan of her right lower extremity that revealed, "Right lower extremity subcutaneous edema consistent with nonspecific cellulitis without evidence of drainable abscess collection.  No soft tissue emphysema or osseous destructive change to indicate osteomyelitis"  · CT scan of left lower extremity that revealed, "Left lower extremity diffuse subcutaneous edema indicating nonspecific cellulitis without drainable abscess collection, soft tissue emphysema or osseous destructive change to indicate osteomyelitis"  · Blood cultures negative  · MRSA nares swab negative   · Noted to have bilateral diabetic foot ulcers, locally infected with surrounding erythema and purulent drainage  · Continue antibiotics with Zosyn- but change to levaquin to complete course   · No history of MRSA: We will discontinue vancomycin  · Patient is clinically improved, afebrile, with improving leukocytosis

## 2023-09-30 ENCOUNTER — TELEPHONE (OUTPATIENT)
Dept: OTHER | Facility: OTHER | Age: 63
End: 2023-09-30

## 2023-09-30 LAB
ERYTHROCYTE [DISTWIDTH] IN BLOOD BY AUTOMATED COUNT: 17.5 % (ref 11.6–15.1)
GLUCOSE SERPL-MCNC: 130 MG/DL (ref 65–140)
GLUCOSE SERPL-MCNC: 184 MG/DL (ref 65–140)
GLUCOSE SERPL-MCNC: 188 MG/DL (ref 65–140)
HCT VFR BLD AUTO: 29.1 % (ref 34.8–46.1)
HGB BLD-MCNC: 8.8 G/DL (ref 11.5–15.4)
MCH RBC QN AUTO: 23.9 PG (ref 26.8–34.3)
MCHC RBC AUTO-ENTMCNC: 30.2 G/DL (ref 31.4–37.4)
MCV RBC AUTO: 79 FL (ref 82–98)
PLATELET # BLD AUTO: 431 THOUSANDS/UL (ref 149–390)
PMV BLD AUTO: 10.5 FL (ref 8.9–12.7)
RBC # BLD AUTO: 3.68 MILLION/UL (ref 3.81–5.12)
WBC # BLD AUTO: 14.29 THOUSAND/UL (ref 4.31–10.16)

## 2023-09-30 PROCEDURE — 82948 REAGENT STRIP/BLOOD GLUCOSE: CPT

## 2023-09-30 PROCEDURE — NC001 PR NO CHARGE: Performed by: PODIATRIST

## 2023-09-30 PROCEDURE — 85027 COMPLETE CBC AUTOMATED: CPT | Performed by: INTERNAL MEDICINE

## 2023-09-30 PROCEDURE — 99239 HOSP IP/OBS DSCHRG MGMT >30: CPT | Performed by: INTERNAL MEDICINE

## 2023-09-30 RX ORDER — NYSTATIN 100000 [USP'U]/G
POWDER TOPICAL 2 TIMES DAILY
Refills: 0
Start: 2023-09-30

## 2023-09-30 RX ORDER — LIDOCAINE 50 MG/G
1 PATCH TOPICAL DAILY
Refills: 0
Start: 2023-10-01

## 2023-09-30 RX ORDER — LEVOFLOXACIN 750 MG/1
750 TABLET ORAL EVERY 24 HOURS
Refills: 0
Start: 2023-09-30 | End: 2023-10-03

## 2023-09-30 RX ORDER — INSULIN GLARGINE 100 [IU]/ML
18 INJECTION, SOLUTION SUBCUTANEOUS EVERY 12 HOURS SCHEDULED
Qty: 10 ML | Refills: 0
Start: 2023-09-30

## 2023-09-30 RX ADMIN — PREDNISONE 4 MG: 1 TABLET ORAL at 08:59

## 2023-09-30 RX ADMIN — HYDRALAZINE HYDROCHLORIDE 10 MG: 10 TABLET, FILM COATED ORAL at 06:34

## 2023-09-30 RX ADMIN — INSULIN LISPRO 2 UNITS: 100 INJECTION, SOLUTION INTRAVENOUS; SUBCUTANEOUS at 08:59

## 2023-09-30 RX ADMIN — LIDOCAINE 1 PATCH: 700 PATCH TOPICAL at 09:00

## 2023-09-30 RX ADMIN — ERYTHROMYCIN 0.5 INCH: 5 OINTMENT OPHTHALMIC at 06:33

## 2023-09-30 RX ADMIN — GABAPENTIN 100 MG: 100 CAPSULE ORAL at 08:59

## 2023-09-30 RX ADMIN — AMLODIPINE BESYLATE 5 MG: 5 TABLET ORAL at 08:59

## 2023-09-30 RX ADMIN — FLUTICASONE FUROATE AND VILANTEROL TRIFENATATE 1 PUFF: 200; 25 POWDER RESPIRATORY (INHALATION) at 08:59

## 2023-09-30 RX ADMIN — NYSTATIN: 100000 POWDER TOPICAL at 08:59

## 2023-09-30 RX ADMIN — HYDRALAZINE HYDROCHLORIDE 10 MG: 10 TABLET, FILM COATED ORAL at 14:23

## 2023-09-30 RX ADMIN — LEVOFLOXACIN 750 MG: 750 TABLET, FILM COATED ORAL at 14:23

## 2023-09-30 RX ADMIN — ERYTHROMYCIN 0.5 INCH: 5 OINTMENT OPHTHALMIC at 14:22

## 2023-09-30 RX ADMIN — METOPROLOL TARTRATE 100 MG: 100 TABLET, FILM COATED ORAL at 08:59

## 2023-09-30 RX ADMIN — INSULIN LISPRO 5 UNITS: 100 INJECTION, SOLUTION INTRAVENOUS; SUBCUTANEOUS at 08:59

## 2023-09-30 RX ADMIN — LOSARTAN POTASSIUM 100 MG: 50 TABLET, FILM COATED ORAL at 14:23

## 2023-09-30 RX ADMIN — INSULIN GLARGINE 18 UNITS: 100 INJECTION, SOLUTION SUBCUTANEOUS at 08:59

## 2023-09-30 RX ADMIN — ATORVASTATIN CALCIUM 40 MG: 40 TABLET, FILM COATED ORAL at 08:59

## 2023-09-30 RX ADMIN — APIXABAN 5 MG: 5 TABLET, FILM COATED ORAL at 08:59

## 2023-09-30 NOTE — DISCHARGE SUMMARY
Discharge Summary - [facility] Internal Medicine    Patient Information: Adalberto Parkinson 57 y.o. female MRN: [id number]  Unit/Bed#: E5 -01 Encounter: [id number]    Discharging Physician / Practitioner: Geni Robins DO  PCP: Connor Garibay MD  Admission Date: 9/23/2023  Discharge Date: 09/30/23    Disposition:     Discharged to [facility] Reason for Admission: severe sepsis     Discharge Diagnoses:     Principal Problem:    Severe sepsis ([facility])  Active Problems:    Uncontrolled type 2 diabetes mellitus with hyperglycemia ([facility])    PMR (polymyalgia rheumatica) ([facility])    Hypertension    Stage 3a chronic kidney disease (HCC)    Ambulatory dysfunction    Calculus of gallbladder without cholecystitis without obstruction    Homelessness    Pulmonary embolism ([facility])    Diabetic foot ulcer ([facility])    Acute viral conjunctivitis of both eyes    Cellulitis of lower extremity    Chronic anemia  Resolved Problems:    * No resolved hospital problems. *      Consultations During Hospital Stay:  · Podiatry     Procedures Performed:     · none    Significant Findings / Test Results:   XR chest 1 view portable  Result Date: 9/24/2023  Impression: No acute cardiopulmonary disease. CT lower extremity w contrast right  Result Date: 9/24/2023  Impression: Right lower extremity subcutaneous edema consistent with nonspecific cellulitis without evidence of drainable abscess collection. No soft tissue emphysema or osseous destructive change to indicate osteomyelitis. CT lower extremity w contrast left  Result Date: 9/24/2023  Impression: Left lower extremity diffuse subcutaneous edema indicating nonspecific cellulitis without drainable abscess collection, soft tissue emphysema or osseous destructive change to indicate osteomyelitis.      Incidental Findings:   · none    Test Results Pending at Discharge (will require follow up):   · none     Outpatient Tests Requested:  none    Hospital Course:     Adalberto Parkinson is a Subjective   Patient ID: Lulu is a 66 year old female here for Office Visit and Follow-up (hyperparathyrodism, Opioid use disorder)  .  Patient has given consent to record this visit for documentation in their clinical record.     HPI   Fibromyalgia syndrome, Opioid use disorder, Arthritis involving multiple sites:  Is on Suboxone 8-2 mg sublingual film TID and is due for refills. She is on Gabapentin 300 mg TID. Baclofen 10 mg at bedtime and Nortriptyline 25 mg nightly with benefits. Mentions trying medical marijuana for degenerative arthritis without benefits.     Additional Comments:  Has a history of parathyroid gland surgery in 2012. The labs done on 10/14/2021 revealed normal calcium level and slightly reduced kidney function of 59%. The previous labs done in 07/2021 revealed kidney function of 63%. Has a history of kidney stones and urinary retention. Denies visiting Urology.   She underwent mammogram at [facility] which revealed normal findings. Also underwent bone density scan several years ago which revealed osteopenia.   She underwent colonoscopy 5 years ago at [facility].   Is due for flu shot.     Health Maintenance:  Health Maintenance Due   Topic Date Due   • Hepatitis B Vaccine (1 of 3 - Risk 3-dose series) Never done   • Medicare Wellness Visit  Never done   • COVID-19 Vaccine (3 - Booster for Moderna series) 09/05/2021       Patient Active Problem List   Diagnosis   • Cervicalgia   • Primary hyperparathyroidism (CMS/HCC)   • DDD (degenerative disc disease), lumbar   • Lumbar pain   • Fibromyalgia syndrome   • Opioid use disorder   • Arthritis involving multiple sites   • Class 1 obesity due to excess calories with serious comorbidity and body mass index (BMI) of 30.0 to 30.9 in adult       History:  Family History   Problem Relation Age of Onset   • Dementia/Alzheimers Mother    • Congestive Heart Failure Father      PAST MEDICAL HX:  History reviewed. No pertinent past medical history.  PAST  57 y.o. female patient who originally presented to the hospital on 9/23/2023 due to sepsis from cellulitis as a result of diabetic foot ulcer. She was treated with zosyn and evaluated by podiatry. She did not have om and will continue with local wound care. Pt improved with zosyn. Her blood cultures were negative along with mrsa. She was changed to levaquin to complete course of antibiotics. Pt was discharged to [facility]. Discharge Day Visit / Exam:     * Please refer to separate progress note for these details *    Discharge instructions/Information to patient and family:   See after visit summary for information provided to patient and family. Provisions for Follow-Up Care:  See after visit summary for information related to follow-up care and any pertinent home health orders. Discharge Statement:  I spent 40 minutes discharging the patient. This time was spent on the day of discharge. I had direct contact with the patient on the day of discharge. Greater than 50% of the total time was spent examining patient, answering all patient questions, arranging and discussing plan of care with patient as well as directly providing post-discharge instructions. Additional time then spent on discharge activities. Discharge Medications:  See after visit summary for reconciled discharge medications provided to patient and family. SURGICAL HX:  Past Surgical History:   Procedure Laterality Date   • Cardiac valve replacement     • Cervical fusion     •  section, classic     •  section, low transverse     • Cholecystectomy     • Joint replacement     • Knee surgery Bilateral     SCOPE   • Lumbar laminectomy     • Parathyroid gland surgery         Social History     Socioeconomic History   • Marital status:      Spouse name: Not on file   • Number of children: Not on file   • Years of education: Not on file   • Highest education level: Not on file   Occupational History   • Not on file   Tobacco Use   • Smoking status: Never Smoker   • Smokeless tobacco: Never Used   Vaping Use   • Vaping Use: Never assessed   Substance and Sexual Activity   • Alcohol use: Yes     Comment: Rarely   • Drug use: Yes     Types: Opioids   • Sexual activity: Not Currently   Other Topics Concern   • Not on file   Social History Narrative   • Not on file     Social Determinants of Health     Financial Resource Strain:    • Social Determinants: Financial Resource Strain: Not on file   Food Insecurity:    • Social Determinants: Food Insecurity: Not on file   Transportation Needs:    • Lack of Transportation (Medical): Not on file   • Lack of Transportation (Non-Medical): Not on file   Physical Activity: Sufficiently Active   • Days of Exercise per Week: 3 days   • Minutes of Exercise per Session: 60 min   Stress: Low Risk    • Social Determinants: Stress: Not at all   Social Connections:    • Social Determinants: Social Connections: Not on file   Intimate Partner Violence:    • Social Determinants: Intimate Partner Violence Past Fear: Not on file   • Social Determinants: Intimate Partner Violence Current Fear: Not on file     Current Outpatient Medications   Medication Sig Dispense Refill   • buprenorphine-naLOXone (SUBOXONE FILM) 8-2 MG sublingual film Place 1 strip under the tongue 3 times daily. 90 each 0   • nortriptyline (PAMELOR) 25  MG capsule Take 1 capsule by mouth nightly. 90 capsule 3   • baclofen (LIORESAL) 10 MG tablet Take 1 tablet by mouth at bedtime as needed (back pain). 90 tablet 3   • gabapentin (NEURONTIN) 300 MG capsule Take 1 capsule by mouth 3 times daily. 270 capsule 3     No current facility-administered medications for this visit.     Patient's medications, allergies, past medical, surgical, social and family histories were reviewed and updated as appropriate.    Review of Systems   Constitutional: Negative for fatigue, fever, activity change and appetite change.  HENT: Negative for congestion, ear pain, postnasal drip, rhinorrhea, sinus pressure, sinus pain, sore throat, tinnitus, dental problem, facial swelling, hearing loss and nosebleeds.  Eyes: Negative for photophobia and visual disturbance.  Respiratory: Negative for cough and wheezing.  Cardiovascular: Negative for chest pain, palpitations and leg swelling.  Gastrointestinal: Negative for abdominal distention, abdominal pain, anal bleeding, blood in stool, constipation, diarrhea and nausea.  Endocrine: Negative for cold intolerance and heat intolerance.  Genitourinary: Negative for difficulty urinating, discharge, dysuria, frequency and urgency.  Musculoskeletal: Negative for back pain, gait problem and neck pain. Positive for pain in multiple areas of the body.   Skin: Negative for rash and wound.   Allergic/Immunologic: Negative for immunocompromised state.  Neurological: Negative for dizziness, light-headedness, headaches and seizures.  Hematological: Negative for adenopathy. Does not bruise/bleed easily.  Psychiatric/Behavioral: Negative for agitation and behavioral problems. The patient is not nervous/anxious.  All other systems reviewed and are negative.     Objective   Visit Vitals  BP 97/66 (BP Location: LUE - Left upper extremity, Patient Position: Sitting)   Pulse (!) 52   Resp 16   Ht 5' 6\" (1.676 m)   Wt 84.8 kg (186 lb 13.4 oz)   LMP  (LMP Unknown)   SpO2  93%   BMI 30.16 kg/m²       Physical Exam   Constitutional: Alert, in no acute distress.  Eyes: No discharge, normal conjunctiva, no eyelid swelling, no ptosis and the sclerae were normal. Pupils equal, round and reactive to light and accommodation, conjugate gaze and extraocular movements were intact.  ENT: Normal appearing outer ear, normal appearing nose. Examination of the tympanic membrane showed normal landmarks, normal appearing external canal. Nasal mucosa moist and pink, no nasal discharge. Normal lips. Oral mucosa pink and moist, no oral lesions.  Neck: Normal appearing, supple neck and no mass was seen. Thyroid not enlarged and no thyroid nodules. No lymphadenopathy.  Pulmonary: No respiratory distress, normal respiratory rate and effort and no accessory muscle use. Breath sounds clear to auscultation bilaterally.  Cardiovascular: Normal rate, no murmurs, regular rhythm, normal S1 and normal S2. Edema was not present in the lower extremities.  Abdomen: Soft, nontender, nondistended, normal bowel sounds and no abdominal mass. No hepatomegaly and no splenomegaly. No umbilical hernia was seen.  Lymphadenopathy: No cervical adenopathy.  Neurological: Alert and oriented to person, place, and time. No cranial nerve deficit or sensory deficit. Exhibits normal muscle tone. Coordination normal.   Musculoskeletal: Normal gait. Positive for pain in the lower back, neck and multiple joints.   Psychiatric: Oriented to person, oriented to place and oriented to time. Interactive and mood/affect were appropriate. Judgment not impaired. Normal attention span. Short term   memory intact.  Skin, Hair, Nails: Normal skin color and pigmentation and no rash. No foot ulcers and no skin ulcer was seen. Normal skin turgor. No clubbing or cyanosis of the fingernails.    Results for orders placed or performed in visit on 10/18/21   POCT MULTIPLE DRUG SCREEN, IN-OFFICE   Result Value Ref Range    POCT CANNABINOIDS Positive (A)  Negative, Not Detected, Detected, N/A    POCT COCAINE Negative Negative, Not Detected, Detected, N/A    POCT MORPHINE Negative Negative, Not Detected, Detected, N/A    POCT Opiates Negative Negative, Not Detected, Detected, N/A    POCT METHAMPHETAMINE Negative Negative, Not Detected, Detected, N/A    POCT AMPHETAMINE Negative Negative, Not Detected, Detected, N/A    POCT BENZODIZEPINE Negative Negative, Not Detected, Detected, N/A    POCT BARBITURATES Negative Negative, Not Detected, Detected, N/A    POCT METHADONE Negative Negative, Not Detected, Detected, N/A    POCT BUPRENORPHINE Positive (A) Negative, Not Detected, Detected, N/A    POCT TCA Negative Negative, Not Detected, Detected, N/A    POCT MDMA Negative Negative, Not Detected, Detected, N/A    POCT OXYCODONE Negative Negative, Not Detected, Detected, N/A    POCT PCP Negative Negative, Not Detected, Detected, N/A       Assessments and Plan:  Problem List Items Addressed This Visit        Endocrine and Metabolic    Class 1 obesity due to excess calories with serious comorbidity and body mass index (BMI) of 30.0 to 30.9 in adult     Obese, recommend improved diet and exercise to decrease weight, with increased protein in diet, less carbohydrates, 3 servings of fruit and vegetables per day, 1 hour of exercise daily, and less than 2 hours of screen time per day            Mental Health    Opioid use disorder    Relevant Medications    buprenorphine-naLOXone (SUBOXONE FILM) 8-2 MG sublingual film    Other Relevant Orders    POCT MULTIPLE DRUG SCREEN, IN-OFFICE (Completed)       Musculoskeletal and Injuries    Fibromyalgia syndrome - Primary    Relevant Orders    SERVICE TO RHEUMATOLOGY IMMUNOLOGY    Arthritis involving multiple sites    Relevant Orders    SERVICE TO RHEUMATOLOGY IMMUNOLOGY        PLAN:  Fibromyalgia syndrome, Opioid use disorder, Arthritis involving multiple sites:  Medication list reviewed and discussed.   Ordered and Obtained POCT multiple drug  screen, in-office; refer to orders.  Continue current medication. Refills provided.   Provided referral for Service to Rheumatology Immunology; refer to orders.     Additional Plan:  Administered Influenza, high dose quadrivalent, preservative-free today; refer to orders.   Reviewed and discussed recent lab reports.   Reviewed and updated health maintenance.   Discussed referral to Urology for further evaluation and intervention.     Follow up in 3 months.     >50% of 31 minutes were spent in counseling and coordination of care.    Refer to orders.  Medical compliance with plan discussed and risks of non-compliance reviewed.  Patient education completed on disease process, etiology & prognosis.  Proper usage and side effects of medications reviewed & discussed.  Patient understands and agrees with the plan.  Return to clinic as clinically indicated as discussed with patient who verbalized understanding of the plan and is in agreement with the plan.    I, Linda Graham, have created a visit summary document based on the audio recording between Niall Basilio MD and this patient for the physician to review, edit as needed, and authenticate.  Creation Date: 10/19/2021    Niall Basilio MD  10/18/2021    I have reviewed and edited the visit summary above and attest that it is accurate.

## 2023-09-30 NOTE — PROGRESS NOTES
[address] [facility]  Progress Note  Name: Fabi Neely  MRN: [id number]  Unit/Bed#: E5 -01 I Date of Admission: 9/23/2023   Date of Service: 9/30/2023 I Hospital Day: 6    Assessment/Plan   * Severe sepsis [facility])  Assessment & Plan  Pt is a 25-year-old female with past medical history significant for with PMHx of homelessness, legally blind, wheelchair bound, DM2, chronic diabetic foot wounds, CKD, and PE anticoagulation presented to the ED with complaints of bilateral lower extremity redness and swelling for the past 2 weeks    · Patient recently discharged from short term rehab about 2 weeks prior to this admission, after recent [facility] admission for COVID  · She had been living unsheltered since that time. She reports that her bandages on her feet had not been changed since she left the rehab  Patient presented to the ED fulfilling septic criteria with tachycardia and leukocytosis 16.10, and lactic acidosis  Septic source: Bilateral lower extremity cellulitis, and bilateral foot wounds with superimposed infection  CT bilateral lower extremities concerning for cellulitis   CXR with no acute cardiopulmonary abnormalities  UA without evidence of infection  Blood cultures negative thus far  Lactic acid 3.7->1.5  Patient was given IV fluid resuscitation, and empiric antibiotics and admitted  She continues to improve: Afebrile, with improved leukocytosis  Currently treated with zosyn day 6- in which she was changed to levaquin. Will continue levaquin to complete a total of 10 days. She is on day 7/10- slight fluctuation in wbc but likely but could be due to change in antibiotic. No other indication that cellulitis is worsening.      Chronic anemia  Assessment & Plan  · Patient appears to have a chronic anemia over the past several years  · Baseline hemoglobin ranging 8-9  · Current hemoglobin at her baseline    Cellulitis of lower extremity  Assessment & Plan  · Patient presents with bilateral lower extremity erythema  · She had a CT scan of her right lower extremity that revealed, "Right lower extremity subcutaneous edema consistent with nonspecific cellulitis without evidence of drainable abscess collection. No soft tissue emphysema or osseous destructive change to indicate osteomyelitis"  · CT scan of left lower extremity that revealed, "Left lower extremity diffuse subcutaneous edema indicating nonspecific cellulitis without drainable abscess collection, soft tissue emphysema or osseous destructive change to indicate osteomyelitis"  · Blood cultures negative  · MRSA nares swab negative   · Noted to have bilateral diabetic foot ulcers, locally infected with surrounding erythema and purulent drainage  · Continue antibiotics with Zosyn- but change to levaquin to complete course of 10 days   · No history of MRSA: We will discontinue vancomycin  · Patient is clinically improved, afebrile. Slight fluctuation in leukocytosis but antibiotics have been changed no other indication of worsening cellulitis. Acute viral conjunctivitis of both eyes  Assessment & Plan  · Pt reports that she began with symptoms of eye discharge and erythema about 2-3 weeks ago, which has been progressively getting worse since that time. She reports that she tried using some eye drops she had around with no relief.   · Started on topical antibiotics with erythromycin, however suspect this is more likely a viral process  · No significant erythema or discharge  · Continue supportive measures and will discontinue antibiotics    Diabetic foot ulcer ([address])  Assessment & Plan  · Patient has a history of chronic diabetic foot ulcers  · Patient noted that the dressings on both of her legs had not been changed since he left short-term rehab greater than 2 weeks prior to this admission  · Patient was evaluated podiatry: Noted to have bilateral diabetic foot ulcers, clinically infected with surrounding erythema and purulent drainage  · CT scan without evidence of osteomyelitis  · Continue antibiotics with Zosyn clinically improving  · Per podiatry patient may be weightbearing as tolerated, continue offloading when nonambulatory  · Patient was reevaluated by podiatry on 9/26: Right foot plantar eschar removed, underlying wound granular/fibrotic  · Continue current antibiotics and local wound care  · Patient clinically and symptomatically improved    Pulmonary embolism ([address])  Assessment & Plan  · Last admitted 8/08-8/15 to [facility] for acute PE and lower extremity DVT  · Discharged on Eliquis, with compliance    Homelessness  Assessment & Plan  · Admitted to [facility] at the end of August and discharge to short term rehab, afterwards patient went back to living unsheltered x since that time  · Patient reports that she has been trying to get into a senior apartment, but they are all full  · She reports difficulty finding a homeless shelter that will accommodate her wheelchair so she has been living outside  · Patient reports she tries to be compliant with medications, but it is sometimes difficult secondary to her situation  · Case management consulted: Assisting with discharge planning- will be discharged to str. Calculus of gallbladder without cholecystitis without obstruction  Assessment & Plan  Noted on previous admission 4/23  No current symptoms  Outpatient serial imaging 4/24 recommended    Ambulatory dysfunction  Assessment & Plan  · Patient notes that since leaving short-term rehab she has remained in her wheelchair and not ambulated   · She notes she has become progressively weaker due to this   · PT/OT eval appreciated:   · D/c to str.      Stage 3a chronic kidney disease [facility])  Assessment & Plan  Lab Results   Component Value Date    EGFR 78 09/28/2023    EGFR 74 09/26/2023    EGFR 79 09/25/2023    CREATININE 0.80 09/28/2023    CREATININE 0.83 09/26/2023    CREATININE 0.79 09/25/2023   · POA creatinine 1.2  · Mildly elevated from baseline of 0.8-0.9, but did not meet strict criteria for acute kidney injury  · Patient was given IV fluids and creatinine normalized      Hypertension  Assessment & Plan  · Continue home Norvasc 5 mg daily, hydralazine 10 mg 3 times daily, losartan 100 mg daily and metoprolol tartrate 100 mg every 12 hours  · bp improved. · Continue current treatment     PMR (polymyalgia rheumatica) ([facility])  Assessment & Plan  · Continue home regimen of prednisone 4 mg daily    Uncontrolled type 2 diabetes mellitus with hyperglycemia [facility])  Assessment & Plan  Lab Results   Component Value Date    HGBA1C 10.2 (H) 2023       Recent Labs     23  1122 23  1542 23  2104 23  0727   POCGLU 179* 174* 142* 184*       Blood Sugar Average: Last 72 hrs:  (P) [id number]   · Continue home insulin regimen of 18 units of Lantus q12 hours with 5 units of lispro tid with meals  · Blood sugars adequately controlled  · SSI with accucheks            Discharge Plan / Estimated Discharge Date: d/c to str   Code Status: Level 1 - Full Code      Subjective:   Pt seen and examined. She states her pain in her legs is minimal. The swelling continues to decrease. No f/c no cp no sob no n/v/d no abd pain     Objective:     Vitals:   Temp (24hrs), Av.4 °F (36.9 °C), Min:97.9 °F (36.6 °C), Max:98.6 °F (37 °C)    Temp:  [97.9 °F (36.6 °C)-98.6 °F (37 °C)] 97.9 °F (36.6 °C)  HR:  [57-70] 59  Resp:  [16-18] 18  BP: (138-152)/(65-81) 144/75  SpO2:  [93 %-98 %] 98 %  Body mass index is 39.12 kg/m². Input and Output Summary (last 24 hours): Intake/Output Summary (Last 24 hours) at 2023 1054  Last data filed at 2023 0725  Gross per 24 hour   Intake 490 ml   Output 1000 ml   Net -510 ml       Physical Exam:   Physical Exam  Constitutional:       Appearance: Normal appearance. HENT:      Head: Normocephalic and atraumatic. Eyes:      Extraocular Movements: Extraocular movements intact.       Pupils: Pupils are equal, round, and reactive to light. Cardiovascular:      Rate and Rhythm: Normal rate and regular rhythm. Heart sounds: No murmur heard. No friction rub. No gallop. Pulmonary:      Effort: Pulmonary effort is normal. No respiratory distress. Breath sounds: Normal breath sounds. No wheezing, rhonchi or rales. Abdominal:      General: Bowel sounds are normal. There is no distension. Palpations: Abdomen is soft. Tenderness: There is no abdominal tenderness. There is no guarding or rebound. Musculoskeletal:      Right lower leg: Edema present. Left lower leg: Edema present. Comments: Edema much improved. Trace on left lower ext +1 around ankle on right lower ext    Skin:     Findings: No erythema. Comments: Wounds on bottom of her right foot without signs of infection. No purulent drainage    Neurological:      Mental Status: She is alert and oriented to person, place, and time. Additional Data:     Labs:  Results from last 7 days   Lab Units 09/30/23  0738 09/28/23  0602   WBC Thousand/uL 14.29* 12.83*   HEMOGLOBIN g/dL 8.8* 8.6*   HEMATOCRIT % 29.1* 29.3*   PLATELETS Thousands/uL 431* 384   NEUTROS PCT %  --  57   LYMPHS PCT %  --  30   MONOS PCT %  --  7   EOS PCT %  --  3     Results from last 7 days   Lab Units 09/28/23  0602 09/25/23  0940 09/24/23  0830   SODIUM mmol/L 139   < > 134*   POTASSIUM mmol/L 3.5   < > 4.1   CHLORIDE mmol/L 109*   < > 105   CO2 mmol/L 24   < > 25   BUN mg/dL 13   < > 16   CREATININE mg/dL 0.80   < > 0.85   ANION GAP mmol/L 6   < > 4   CALCIUM mg/dL 8.5   < > 7.9*   ALBUMIN g/dL  --   --  2.5*   TOTAL BILIRUBIN mg/dL  --   --  0.46   ALK PHOS U/L  --   --  106*   ALT U/L  --   --  6*   AST U/L  --   --  8*   GLUCOSE RANDOM mg/dL 150*   < > 280*    < > = values in this interval not displayed.      Results from last 7 days   Lab Units 09/24/23  0026   INR  1.04     Results from last 7 days   Lab Units 09/30/23  0727 09/29/23  2104 09/29/23  1542 09/29/23  1122 09/29/23  0728 09/28/23  2034 09/28/23  1559 09/28/23  1121 09/28/23  0723 09/27/23  2043 09/27/23  1623 09/27/23  1112   POC GLUCOSE mg/dl 184* 142* 174* 179* 173* 252* 172* 179* 140 220* 184* 205*         Results from last 7 days   Lab Units 09/25/23  0704 09/24/23  0830 09/24/23  0348 09/24/23  0026   LACTIC ACID mmol/L  --   --  1.5 3.7*   PROCALCITONIN ng/ml 0.05 0.07  --  0.16     Recent Cultures (last 7 days):     Results from last 7 days   Lab Units 09/24/23  0026   BLOOD CULTURE  No Growth After 5 Days. No Growth After 5 Days. Lines/Drains:  Invasive Devices     Peripheral Intravenous Line  Duration           Peripheral IV 09/27/23 Dorsal (posterior); Right Forearm 3 days              Last 24 Hours Medication List:   Current Facility-Administered Medications   Medication Dose Route Frequency Provider Last Rate   • acetaminophen  650 mg Oral Q6H PRN Rayne Khan PA-C     • aluminum-magnesium hydroxide-simethicone  30 mL Oral Q6H PRN Rayne Khan PA-C     • amLODIPine  5 mg Oral Daily Rayne Khan PA-C     • apixaban  5 mg Oral BID Rayne Khan PA-C     • atorvastatin  40 mg Oral Daily Rayne Khan PA-C     • erythromycin  0.5 inch Both Eyes Q6H [address] Rayne Khan PA-C     • fluticasone-vilanterol  1 puff Inhalation Daily Rayne Kahn PA-C     • gabapentin  100 mg Oral BID Rayne Khan PA-C     • hydrALAZINE  5 mg Intravenous Q6H PRN Drury Epley, DO     • hydrALAZINE  10 mg Oral TID CONCETTA Arroyo     • insulin glargine  18 Units Subcutaneous Q12H [address] Rayne Khan PA-C     • insulin lispro  2-12 Units Subcutaneous TID [facility] Koreen [place], PA-C     • insulin lispro  2-12 Units Subcutaneous HS Rayne Khan PA-C     • insulin lispro  5 Units Subcutaneous TID With Meals Rayne Khan PA-C     • levofloxacin  750 mg Oral Q24H Lori Voss DO     • lidocaine  1 patch Topical Daily Familia Lazar Troy Du PA-C     • losartan  100 mg Oral Daily With [address], CRNP     • metoprolol tartrate  100 mg Oral Q12H [facility] Columba LockMELVA reardon     • nystatin   Topical BID Columba LockMELVA reardon     • oxyCODONE  5 mg Oral Q6H PRN Columba LockMELVA reardon     • oxyCODONE  2.5 mg Oral Q6H PRN Columba LockMELVA reardon     • predniSONE  4 mg Oral Daily Columba LockMELVA reardon     • topiramate  100 mg Oral HS Columba LockMELVA reardon          Today, Patient Was Seen By: Frank Quan DO

## 2023-09-30 NOTE — CASE MANAGEMENT
Case Management Discharge Planning Note    Patient name Rusty Hubbard  Location [facility] /E5 MS 12-* MRN [id number]  : 1960 Date 2023       Current Admission Date: 2023  Current Admission Diagnosis:Severe sepsis [facility])   Patient Active Problem List    Diagnosis Date Noted   • Cellulitis of lower extremity 2023   • Chronic anemia 2023   • Acute viral conjunctivitis of both eyes 2023   • Pulmonary embolism ([address]) 2023   • Diabetic foot ulcer ([address]) 2023   • Ulcer of left heel ([address]) 2023   • Homelessness 2023   • Encounter for support and coordination of transition of care 2023   • Food insecurity 2023   • Leukocytosis 2023   • Friction blisters of the soles 2023   • Calculus of gallbladder without cholecystitis without obstruction 2023   • Severe sepsis ([address]) 2023   • Diabetes mellitus ([address]) 2023   • Abnormal CT scan 2023   • Suspected pressure injury of deep tissue 03/15/2023   • Dyslipidemia 2023   • Insulin long-term use ([address]) 2023   • Type 2 diabetes mellitus with hyperglycemia ([address]) 2023   • Ambulatory dysfunction 2023   • Legally blind 2023   • Diarrhea 2023   • Bilateral lower extremity edema 2023   • Unsheltered homelessness 2023   • Abnormal urinalysis 2023   • Weakness 2023   • Intertrigo 2023   • Leg numbness 2023   • Unsteadiness on feet 2022   • Obstructive sleep apnea 2022   • Diabetic neuropathy ([address]) 2022   • Stage 3a chronic kidney disease ([address]) 2022   • Polymyalgia rheumatica ([address]) 2021   • Gait instability 2021   • Ulnar neuropathy of right upper extremity    • Blurry vision, bilateral 2021   • Depressed mood 10/08/2020   • Hypertension 05/10/2020   • Chronic migraine without aura without status migrainosus, not intractable 2020   • Hypersomnia 2020   • Cerebral aneurysm without rupture 04/06/2020   • History of absence seizures 03/25/2020   • Thyroid nodule 03/06/2020   • Aneurysm ([address]) 03/05/2020   • Type 2 diabetes mellitus with hyperglycemia, with long-term current use of insulin ([address]) 02/21/2020   • Left cavernous carotid aneurysm 02/10/2020   • Polyneuropathy associated with underlying disease ([address]) 01/07/2020   • PMR (polymyalgia rheumatica) ([address]) 01/07/2020   • Thrombocytosis 01/07/2020   • Morbid obesity ([address]) 09/19/2019   • Other inflammatory and immune myopathies, not elsewhere classified 09/16/2019   • Type 2 diabetes mellitus with microalbuminuria, with long-term current use of insulin ([address]) 02/01/2019   • Mild intermittent asthma without complication 35/35/5771   • Orthostatic hypotension 06/25/2018   • Lung nodules 03/22/2018   • Exertional dyspnea 02/13/2018   • Enlarged pulmonary artery ([address]) 02/13/2018   • Aortic dilatation (HCC) 02/13/2018   • Uncontrolled type 2 diabetes mellitus with hyperglycemia (HCC)    • Seizures ([address])    • QT prolongation 12/18/2017   • Decreased pulses in feet 12/11/2017   • Microalbuminuria 09/08/2015   • Vitamin D deficiency 06/06/2014   • Visual impairment in both eyes 12/06/2012   • Anemia 03/20/2012   • Hyperlipidemia 03/20/2012   • Neuropathy of hand, right 03/20/2012      LOS (days): 6  Geometric Mean LOS (GMLOS) (days): 3.50  Days to GMLOS:-2.9     OBJECTIVE:  Risk of Unplanned Readmission Score: 43.15         Current admission status: Inpatient   Preferred Pharmacy:   [address] [address] [address]  Phone: [phone] Fax: [address] [place] [address] - [address],  [address] [facility],  [address] [place] [address]  Phone: [phone] Fax: [phone]    Primary Care Provider: Juany Drummond MD    Primary Insurance: [facility]  School [employer] REP  Secondary Insurance: [address] [employer]    DISCHARGE DETAILS:    Discharge planning discussed with[de-identified] patient and son  Freedom of Choice: Yes  Comments - Freedom of Choice: agreeable to discharge today to [facility] SNF  CM contacted family/caregiver?: Yes  Were Treatment Team discharge recommendations reviewed with patient/caregiver?: Yes  Did patient/caregiver verbalize understanding of patient care needs?: N/A- going to facility  Were patient/caregiver advised of the risks associated with not following Treatment Team discharge recommendations?: Yes    Contacts  Patient Contacts: Kiel Lamb  Relationship to Patient[de-identified] Family  Contact Method: In Person  Reason/Outcome: Continuity of Care, Discharge [address]         Is the patient interested in [facility] at discharge?: No    DME Referral Provided  Referral made for DME?: No    Other Referral/Resources/Interventions Provided:  Interventions: SNF         Treatment Team Recommendation: SNF  Discharge Destination Plan[de-identified] SNF  Transport at Discharge : BLS Ambulance  Dispatcher Contacted: Yes  Number/Name of Dispatcher: Astridestuardo Rodrigez by [place] and Unit #): BRANDIE P) [phone]  ETA of Transport (Date): 09/30/23  ETA of Transport (Time): 1600     Transfer Mode: Stretcher  Accompanied by: EMS personnel  Transfer Equipment: BLS devices         Auth obtained by Luis Manuel J)[phone]. Start of Care date: 9/29/23. Next Review date: 10/3/23. Continued Stay Care Coordinator: [facility]. Submit Next Review via fax: [phone]. Patient is getting discharged today to [facility]. MAI spoke with Viry Lucas at [facility] to confirm that it is okay for patient to admit there today. MAI placed BLS transport request via [facility]. BRANDIE accepted the trip at 1600. CM made SLIM, patient and RN aware. RN will let Deanna aware of transport time. CM faxed AVS to [facility].  MAI spoke with Day whom will pick patient's wc up from E5 and then transport to Deanna . RN will let [facility] know of this when providing report. Patient and son aware and agreeable to this plan.

## 2023-09-30 NOTE — TELEPHONE ENCOUNTER
[facility] from [facility] [place]/ new admission orders    VIA TT

## 2023-09-30 NOTE — NURSING NOTE
Report called to Henna Jones at [facility]. Patient being picked up via BLS for transport upon DC at 1600 today.

## 2023-09-30 NOTE — PLAN OF CARE
Problem: PAIN - ADULT  Goal: Verbalizes/displays adequate comfort level or baseline comfort level  Description: Interventions:  - Encourage patient to monitor pain and request assistance  - Assess pain using appropriate pain scale  - Administer analgesics based on type and severity of pain and evaluate response  - Implement non-pharmacological measures as appropriate and evaluate response  - Consider cultural and social influences on pain and pain management  - Notify physician/advanced practitioner if interventions unsuccessful or patient reports new pain  Outcome: Progressing     Problem: Nutrition/Hydration-ADULT  Goal: Nutrient/Hydration intake appropriate for improving, restoring or maintaining nutritional needs  Description: Monitor and assess patient's nutrition/hydration status for malnutrition. Collaborate with interdisciplinary team and initiate plan and interventions as ordered. Monitor patient's weight and dietary intake as ordered or per policy. Utilize nutrition screening tool and intervene as necessary. Determine patient's food preferences and provide high-protein, high-caloric foods as appropriate.      INTERVENTIONS:  - Monitor oral intake, urinary output, labs, and treatment plans  - Assess nutrition and hydration status and recommend course of action  - Evaluate amount of meals eaten  - Assist patient with eating if necessary   - Allow adequate time for meals  - Recommend/ encourage appropriate diets, oral nutritional supplements, and vitamin/mineral supplements  - Order, calculate, and assess calorie counts as needed  - Recommend, monitor, and adjust tube feedings  based on assessed needs  - Assess need for intravenous fluids  - Provide  nutrition/hydration education as appropriate  - Include patient/family/caregiver in decisions related to nutrition  Outcome: Progressing

## 2023-09-30 NOTE — PLAN OF CARE
Problem: MOBILITY - ADULT  Goal: Maintain or return to baseline ADL function  Description: INTERVENTIONS:  -  Assess patient's ability to carry out ADLs; assess patient's baseline for ADL function and identify physical deficits which impact ability to perform ADLs (bathing, care of mouth/teeth, toileting, grooming, dressing, etc.)  - Assess/evaluate cause of self-care deficits   - Assess range of motion  - Assess patient's mobility; develop plan if impaired  - Assess patient's need for assistive devices and provide as appropriate  - Encourage maximum independence but intervene and supervise when necessary  - Involve family in performance of ADLs  - Assess for home care needs following discharge   - Consider OT consult to assist with ADL evaluation and planning for discharge  - Provide patient education as appropriate  Outcome: Adequate for Discharge  Goal: Maintains/Returns to pre admission functional level  Description: INTERVENTIONS:  - Perform BMAT or MOVE assessment daily.   - Set and communicate daily mobility goal to care team and patient/family/caregiver.    - Collaborate with rehabilitation services on mobility goals if consulted  - Out of bed for toileting  - Record patient progress and toleration of activity level   Outcome: Adequate for Discharge     Problem: PAIN - ADULT  Goal: Verbalizes/displays adequate comfort level or baseline comfort level  Description: Interventions:  - Encourage patient to monitor pain and request assistance  - Assess pain using appropriate pain scale  - Administer analgesics based on type and severity of pain and evaluate response  - Implement non-pharmacological measures as appropriate and evaluate response  - Consider cultural and social influences on pain and pain management  - Notify physician/advanced practitioner if interventions unsuccessful or patient reports new pain  Outcome: Adequate for Discharge     Problem: INFECTION - ADULT  Goal: Absence or prevention of progression during hospitalization  Description: INTERVENTIONS:  - Assess and monitor for signs and symptoms of infection  - Monitor lab/diagnostic results  - Monitor all insertion sites, i.e. indwelling lines, tubes, and drains  - Monitor endotracheal if appropriate and nasal secretions for changes in amount and color  - [place] appropriate cooling/warming therapies per order  - Administer medications as ordered  - Instruct and encourage patient and family to use good hand hygiene technique  - Identify and instruct in appropriate isolation precautions for identified infection/condition  Outcome: Adequate for Discharge     Problem: SAFETY ADULT  Goal: Maintain or return to baseline ADL function  Description: INTERVENTIONS:  -  Assess patient's ability to carry out ADLs; assess patient's baseline for ADL function and identify physical deficits which impact ability to perform ADLs (bathing, care of mouth/teeth, toileting, grooming, dressing, etc.)  - Assess/evaluate cause of self-care deficits   - Assess range of motion  - Assess patient's mobility; develop plan if impaired  - Assess patient's need for assistive devices and provide as appropriate  - Encourage maximum independence but intervene and supervise when necessary  - Involve family in performance of ADLs  - Assess for home care needs following discharge   - Consider OT consult to assist with ADL evaluation and planning for discharge  - Provide patient education as appropriate  Outcome: Adequate for Discharge  Goal: Maintains/Returns to pre admission functional level  Description: INTERVENTIONS:  - Perform BMAT or MOVE assessment daily.   - Set and communicate daily mobility goal to care team and patient/family/caregiver.    - Collaborate with rehabilitation services on mobility goals if consulted  - Out of bed for toileting  - Record patient progress and toleration of activity level   Outcome: Adequate for Discharge  Goal: Patient will remain free of falls  Description: INTERVENTIONS:  - Educate patient/family on patient safety including physical limitations  - Instruct patient to call for assistance with activity   - Consult OT/PT to assist with strengthening/mobility   - Keep Call bell within reach  - Keep bed low and locked with side rails adjusted as appropriate  - Keep care items and personal belongings within reach  - Initiate and maintain comfort rounds  - Make Fall Risk Sign visible to staff  - Apply yellow socks and bracelet for high fall risk patients  - Consider moving patient to room near nurses station  Outcome: Adequate for Discharge     Problem: DISCHARGE PLANNING  Goal: Discharge to home or other facility with appropriate resources  Description: INTERVENTIONS:  - Identify barriers to discharge w/patient and caregiver  - Arrange for needed discharge resources and transportation as appropriate  - Identify discharge learning needs (meds, wound care, etc.)  - Arrange for interpretive services to assist at discharge as needed  - Refer to Case Management Department for coordinating discharge planning if the patient needs post-hospital services based on physician/advanced practitioner order or complex needs related to functional status, cognitive ability, or social support system  Outcome: Adequate for Discharge     Problem: Knowledge Deficit  Goal: Patient/family/caregiver demonstrates understanding of disease process, treatment plan, medications, and discharge instructions  Description: Complete learning assessment and assess knowledge base.   Interventions:  - Provide teaching at level of understanding  - Provide teaching via preferred learning methods  Outcome: Adequate for Discharge     Problem: CARDIOVASCULAR - ADULT  Goal: Maintains optimal cardiac output and hemodynamic stability  Description: INTERVENTIONS:  - Monitor I/O, vital signs and rhythm  - Monitor for S/S and trends of decreased cardiac output  - Administer and titrate ordered vasoactive medications to optimize hemodynamic stability  - Assess quality of pulses, skin color and temperature  - Assess for signs of decreased coronary artery perfusion  - Instruct patient to report change in severity of symptoms  Outcome: Adequate for Discharge  Goal: Absence of cardiac dysrhythmias or at baseline rhythm  Description: INTERVENTIONS:  - Continuous cardiac monitoring, vital signs, obtain 12 lead EKG if ordered  - Administer antiarrhythmic and heart rate control medications as ordered  - Monitor electrolytes and administer replacement therapy as ordered  Outcome: Adequate for Discharge     Problem: RESPIRATORY - ADULT  Goal: Achieves optimal ventilation and oxygenation  Description: INTERVENTIONS:  - Assess for changes in respiratory status  - Assess for changes in mentation and behavior  - Position to facilitate oxygenation and minimize respiratory effort  - Oxygen administered by appropriate delivery if ordered  - Initiate smoking cessation education as indicated  - Encourage broncho-pulmonary hygiene including cough, deep breathe, Incentive Spirometry  - Assess the need for suctioning and aspirate as needed  - Assess and instruct to report SOB or any respiratory difficulty  - Respiratory Therapy support as indicated  Outcome: Adequate for Discharge     Problem: METABOLIC, FLUID AND ELECTROLYTES - ADULT  Goal: Glucose maintained within target range  Description: INTERVENTIONS:  - Monitor Blood Glucose as ordered  - Assess for signs and symptoms of hyperglycemia and hypoglycemia  - Administer ordered medications to maintain glucose within target range  - Assess nutritional intake and initiate nutrition service referral as needed  Outcome: Adequate for Discharge     Problem: SKIN/TISSUE INTEGRITY - ADULT  Goal: Incision(s), wounds(s) or drain site(s) healing without S/S of infection  Description: INTERVENTIONS  - Assess and document dressing, incision, wound bed, drain sites and surrounding tissue  - Provide patient and family education  Outcome: Adequate for Discharge     Problem: Prexisting or High Potential for Compromised Skin Integrity  Goal: Skin integrity is maintained or improved  Description: INTERVENTIONS:  - Identify patients at risk for skin breakdown  - Assess and monitor skin integrity  - Assess and monitor nutrition and hydration status  - Monitor labs   - Assess for incontinence   - Turn and reposition patient  - Assist with mobility/ambulation  - Relieve pressure over bony prominences  - Avoid friction and shearing  - Provide appropriate hygiene as needed including keeping skin clean and dry  - Evaluate need for skin moisturizer/barrier cream  - Collaborate with interdisciplinary team   - Patient/family teaching  - Consider wound care consult   Outcome: Adequate for Discharge     Problem: Nutrition/Hydration-ADULT  Goal: Nutrient/Hydration intake appropriate for improving, restoring or maintaining nutritional needs  Description: Monitor and assess patient's nutrition/hydration status for malnutrition. Collaborate with interdisciplinary team and initiate plan and interventions as ordered. Monitor patient's weight and dietary intake as ordered or per policy. Utilize nutrition screening tool and intervene as necessary. Determine patient's food preferences and provide high-protein, high-caloric foods as appropriate.      INTERVENTIONS:  - Monitor oral intake, urinary output, labs, and treatment plans  - Assess nutrition and hydration status and recommend course of action  - Evaluate amount of meals eaten  - Assist patient with eating if necessary   - Allow adequate time for meals  - Recommend/ encourage appropriate diets, oral nutritional supplements, and vitamin/mineral supplements  - Order, calculate, and assess calorie counts as needed  - Recommend, monitor, and adjust tube feedings  based on assessed needs  - Assess need for intravenous fluids  - Provide  nutrition/hydration education as appropriate  - Include patient/family/caregiver in decisions related to nutrition  Outcome: Adequate for Discharge

## 2023-09-30 NOTE — ASSESSMENT & PLAN NOTE
· Patient notes that since leaving short-term rehab she has remained in her wheelchair and not ambulated   · She notes she has become progressively weaker due to this   · PT/OT lashay appreciated:   · D/c to str.

## 2023-09-30 NOTE — ASSESSMENT & PLAN NOTE
Lab Results   Component Value Date    HGBA1C 10.2 (H) 08/24/2023       Recent Labs     09/29/23  1122 09/29/23  1542 09/29/23  2104 09/30/23  0727   POCGLU 179* 174* 142* 184*       Blood Sugar Average: Last 72 hrs:  (P) [id number]   · Continue home insulin regimen of 18 units of Lantus q12 hours with 5 units of lispro tid with meals  · Blood sugars adequately controlled  · SSI with accucheks

## 2023-09-30 NOTE — CASE MANAGEMENT
Case Management Discharge Planning Note    Patient name José Miguel Hill  [place] [address] /E5 MS 12-* MRN [id number]  : 1960 Date 2023       Current Admission Date: 2023  Current Admission Diagnosis:Severe sepsis [facility])   Patient Active Problem List    Diagnosis Date Noted   • Cellulitis of lower extremity 2023   • Chronic anemia 2023   • Acute viral conjunctivitis of both eyes 2023   • Pulmonary embolism ([address]) 2023   • Diabetic foot ulcer ([address]) 2023   • Ulcer of left heel ([address]) 2023   • Homelessness 2023   • Encounter for support and coordination of transition of care 2023   • Food insecurity 2023   • Leukocytosis 2023   • Friction blisters of the soles 2023   • Calculus of gallbladder without cholecystitis without obstruction 2023   • Severe sepsis ([address]) 2023   • Diabetes mellitus ([address]) 2023   • Abnormal CT scan 2023   • Suspected pressure injury of deep tissue 03/15/2023   • Dyslipidemia 2023   • Insulin long-term use ([address]) 2023   • Type 2 diabetes mellitus with hyperglycemia ([address]) 2023   • Ambulatory dysfunction 2023   • Legally blind 2023   • Diarrhea 2023   • Bilateral lower extremity edema 2023   • Unsheltered homelessness 2023   • Abnormal urinalysis 2023   • Weakness 2023   • Intertrigo 2023   • Leg numbness 2023   • Unsteadiness on feet 2022   • Obstructive sleep apnea 2022   • Diabetic neuropathy ([address]) 2022   • Stage 3a chronic kidney disease ([address]) 2022   • Polymyalgia rheumatica ([address]) 2021   • Gait instability 2021   • Ulnar neuropathy of right upper extremity    • Blurry vision, bilateral 2021   • Depressed mood 10/08/2020   • Hypertension 05/10/2020   • Chronic migraine without aura without status migrainosus, not intractable 2020   • Hypersomnia 2020   • Cerebral aneurysm without rupture 04/06/2020   • History of absence seizures 03/25/2020   • Thyroid nodule 03/06/2020   • Aneurysm ([address]) 03/05/2020   • Type 2 diabetes mellitus with hyperglycemia, with long-term current use of insulin ([address]) 02/21/2020   • Left cavernous carotid aneurysm 02/10/2020   • Polyneuropathy associated with underlying disease ([address]) 01/07/2020   • PMR (polymyalgia rheumatica) ([address]) 01/07/2020   • Thrombocytosis 01/07/2020   • Morbid obesity ([address]) 09/19/2019   • Other inflammatory and immune myopathies, not elsewhere classified 09/16/2019   • Type 2 diabetes mellitus with microalbuminuria, with long-term current use of insulin ([address]) 02/01/2019   • Mild intermittent asthma without complication 62/95/7180   • Orthostatic hypotension 06/25/2018   • Lung nodules 03/22/2018   • Exertional dyspnea 02/13/2018   • Enlarged pulmonary artery ([address]) 02/13/2018   • Aortic dilatation (HCC) 02/13/2018   • Uncontrolled type 2 diabetes mellitus with hyperglycemia (HCC)    • Seizures ([address])    • QT prolongation 12/18/2017   • Decreased pulses in feet 12/11/2017   • Microalbuminuria 09/08/2015   • Vitamin D deficiency 06/06/2014   • Visual impairment in both eyes 12/06/2012   • Anemia 03/20/2012   • Hyperlipidemia 03/20/2012   • Neuropathy of hand, right 03/20/2012      LOS (days): 6  Geometric Mean LOS (GMLOS) (days): 3.50  Days to GMLOS:-2.6     OBJECTIVE:  Risk of Unplanned Readmission Score: 42.16         Current admission status: Inpatient   Preferred Pharmacy:   [address] [place] [id number]  Phone: [phone] Fax: [address] [place] [address] - [facility],  [address],  [address] [place] [address]  Phone: [phone] Fax: [phone]    Primary Care Provider: Zoë Garvey MD    Primary Insurance: [facility] [address] [facility] REP  Secondary Insurance: [address] [employer]    DISCHARGE DETAILS:                                                                                                               [facility] Number: [id number] ([facility])

## 2023-09-30 NOTE — PROGRESS NOTES
Podiatry - Progress Note  Patient: Gt Espinal 57 y.o. female   MRN: [id number]  PCP: Norma Mike MD  Unit/Bed#: E5 -59 Encounter: [id number]  Date Of Visit: 23    ASSESSMENT:    Gt Espinal is a 57 y.o. female with:    1. Severe sepsis- resolved  2. Bilateral diabetic foot ulcers- POA  3. Type 2 diabetes mellitus   4. Stage 3a CKD      PLAN:    · Local wound care to bilateral diabetic foot ulcers consisting of Betadine DSD. Wounds appear stable at this time, with no acute clinical signs of infection present. · Patient is stable from podiatric standpoint, with discharge to [facility]. · Continue local wound care, appreciate nursing assistance with dressing changes. · Elevation and offloading on green foam wedges or pillows when non-ambulatory. · Rest of care per primary team.     Weightbearing status: Weightbearing as tolerated    SUBJECTIVE:     The patient was seen, evaluated, and assessed at bedside today. The patient was awake, alert, and in no acute distress. No acute events overnight. The patient reports that she is feeling well today. Patient denies N/V/F/chills/SOB/CP. OBJECTIVE:     Vitals:   /75 (BP Location: Left arm)   Pulse 59   Temp 97.9 °F (36.6 °C) (Oral)   Resp 18   Ht 5' 8" (1.727 m)   Wt 117 kg (257 lb 4.4 oz)   SpO2 98%   BMI 39.12 kg/m²     Temp (24hrs), Av.4 °F (36.9 °C), Min:97.9 °F (36.6 °C), Max:98.6 °F (37 °C)      Physical Exam:     Lungs: Non labored breathing  Abdomen: Soft, non-tender. Lower Extremity:  Cardiovascular status at baseline from admission. Neurological status at baseline from admission. Musculoskeletal status at baseline from admission. No calf tenderness noted.      Wound #: 1  Location: Right plantar foot   Length 2.0 cm: Width 3.5cm: Depth 0.2cm:   Deepest Tissue Noted in Base: Subcutaneous   Probe to Bone: No  Peripheral Skin Description: Attached  Granulation: 0% Fibrotic Tissue: 100% Necrotic Tissue: 0%   Drainage Amount: minimal, serous  Signs of Infection: No       Wound #: 2  Location: Plantar left foot (distal)  Length 1.0cm: Width 1.0cm: Depth 0.2cm:   Deepest Tissue Noted in Base: Subcutaneous   Probe to Bone: No  Peripheral Skin Description: Attached  Granulation: 50% Fibrotic Tissue: 50% Necrotic Tissue: 0%   Drainage Amount: minimal, serosanguinous  Signs of Infection: No     Wound #: 3  Location: Plantar left foot (proximal)  Length 1.3cm: Width 1.2cm: Depth 0.1cm:   Deepest Tissue Noted in Base: Subcutaneous   Probe to Bone: No  Peripheral Skin Description: Attached  Granulation: 20% Fibrotic Tissue: 80% Necrotic Tissue: 0%   Drainage Amount: minimal, serous  Signs of Infection: No    Clinical Images 09/30/23: Additional Data:     Labs:    Results from last 7 days   Lab Units 09/30/23  0738 09/28/23  0602   WBC Thousand/uL 14.29* 12.83*   HEMOGLOBIN g/dL 8.8* 8.6*   HEMATOCRIT % 29.1* 29.3*   PLATELETS Thousands/uL 431* 384   NEUTROS PCT %  --  57   LYMPHS PCT %  --  30   MONOS PCT %  --  7   EOS PCT %  --  3     Results from last 7 days   Lab Units 09/28/23  0602 09/25/23  0940 09/24/23  0830   POTASSIUM mmol/L 3.5   < > 4.1   CHLORIDE mmol/L 109*   < > 105   CO2 mmol/L 24   < > 25   BUN mg/dL 13   < > 16   CREATININE mg/dL 0.80   < > 0.85   CALCIUM mg/dL 8.5   < > 7.9*   ALK PHOS U/L  --   --  106*   ALT U/L  --   --  6*   AST U/L  --   --  8*    < > = values in this interval not displayed. Results from last 7 days   Lab Units 09/24/23  0026   INR  1.04       * I Have Reviewed All Lab Data Listed Above. Recent Cultures (last 7 days):     Results from last 7 days   Lab Units 09/24/23  0026   BLOOD CULTURE  No Growth After 5 Days. No Growth After 5 Days. Imaging: I have personally reviewed pertinent films in PACS  EKG, Pathology, and Other Studies: I have personally reviewed pertinent reports.     ** Please Note: Portions of the record may have been created with voice recognition software. Occasional wrong word or "sound a like" substitutions may have occurred due to the inherent limitations of voice recognition software. Read the chart carefully and recognize, using context, where substitutions have occurred.  **

## 2023-09-30 NOTE — ASSESSMENT & PLAN NOTE
· Patient presents with bilateral lower extremity erythema  · She had a CT scan of her right lower extremity that revealed, "Right lower extremity subcutaneous edema consistent with nonspecific cellulitis without evidence of drainable abscess collection. No soft tissue emphysema or osseous destructive change to indicate osteomyelitis"  · CT scan of left lower extremity that revealed, "Left lower extremity diffuse subcutaneous edema indicating nonspecific cellulitis without drainable abscess collection, soft tissue emphysema or osseous destructive change to indicate osteomyelitis"  · Blood cultures negative  · MRSA nares swab negative   · Noted to have bilateral diabetic foot ulcers, locally infected with surrounding erythema and purulent drainage  · Continue antibiotics with Zosyn- but change to levaquin to complete course of 10 days   · No history of MRSA: We will discontinue vancomycin  · Patient is clinically improved, afebrile. Slight fluctuation in leukocytosis but antibiotics have been changed no other indication of worsening cellulitis.

## 2023-09-30 NOTE — CASE MANAGEMENT
[facility] has received approved authorization from insurance: [address] [place] [address] in by Randell Gage P#  [phone]  Authorization received for: SNF  Facility: [address] [place] [address] Road #:[id number]  Start of Care: 9/29/23  Next Review Date: 10/3/23  Continued Stay Care Coordinator: Ruby Malin  P#: [phone]  Submit next review to via [address] [place] [address] notified: [facility] Team: Jhonatan Valiente

## 2023-09-30 NOTE — ASSESSMENT & PLAN NOTE
· Last admitted 8/08-8/15 to [facility] for acute PE and lower extremity DVT  · Discharged on Eliquis, with compliance

## 2023-09-30 NOTE — ASSESSMENT & PLAN NOTE
Pt is a 76-year-old female with past medical history significant for with PMHx of homelessness, legally blind, wheelchair bound, DM2, chronic diabetic foot wounds, CKD, and PE anticoagulation presented to the ED with complaints of bilateral lower extremity redness and swelling for the past 2 weeks    · Patient recently discharged from short term rehab about 2 weeks prior to this admission, after recent [facility] admission for COVID  · She had been living unsheltered since that time. She reports that her bandages on her feet had not been changed since she left the rehab  Patient presented to the ED fulfilling septic criteria with tachycardia and leukocytosis 16.10, and lactic acidosis  Septic source: Bilateral lower extremity cellulitis, and bilateral foot wounds with superimposed infection  CT bilateral lower extremities concerning for cellulitis   CXR with no acute cardiopulmonary abnormalities  UA without evidence of infection  Blood cultures negative thus far  Lactic acid 3.7->1.5  Patient was given IV fluid resuscitation, and empiric antibiotics and admitted  She continues to improve: Afebrile, with improved leukocytosis  Currently treated with zosyn day 6- in which she was changed to levaquin. Will continue levaquin to complete a total of 10 days. She is on day 7/10- slight fluctuation in wbc but likely but could be due to change in antibiotic. No other indication that cellulitis is worsening.

## 2023-09-30 NOTE — ASSESSMENT & PLAN NOTE
· Admitted to [facility] at the end of August and discharge to short term rehab, afterwards patient went back to living unsheltered x since that time  · Patient reports that she has been trying to get into a senior apartment, but they are all full  · She reports difficulty finding a homeless shelter that will accommodate her wheelchair so she has been living outside  · Patient reports she tries to be compliant with medications, but it is sometimes difficult secondary to her situation  · Case management consulted: Assisting with discharge planning- will be discharged to [facility].

## 2023-10-01 VITALS
WEIGHT: 257.28 LBS | SYSTOLIC BLOOD PRESSURE: 139 MMHG | DIASTOLIC BLOOD PRESSURE: 75 MMHG | HEIGHT: 68 IN | BODY MASS INDEX: 38.99 KG/M2 | TEMPERATURE: 99.5 F | RESPIRATION RATE: 20 BRPM | HEART RATE: 76 BPM | OXYGEN SATURATION: 97 %

## 2023-10-02 ENCOUNTER — PATIENT OUTREACH (OUTPATIENT)
Dept: CASE MANAGEMENT | Facility: OTHER | Age: 63
End: 2023-10-02

## 2023-10-02 ENCOUNTER — NURSING HOME VISIT (OUTPATIENT)
Dept: GERIATRICS | Facility: OTHER | Age: 63
End: 2023-10-02
Payer: COMMERCIAL

## 2023-10-02 DIAGNOSIS — A41.9 SEVERE SEPSIS (HCC): Primary | ICD-10-CM

## 2023-10-02 DIAGNOSIS — Z59.00 HOMELESSNESS: ICD-10-CM

## 2023-10-02 DIAGNOSIS — M35.3 POLYMYALGIA RHEUMATICA (HCC): ICD-10-CM

## 2023-10-02 DIAGNOSIS — E78.5 DYSLIPIDEMIA: ICD-10-CM

## 2023-10-02 DIAGNOSIS — E13.621: ICD-10-CM

## 2023-10-02 DIAGNOSIS — R65.20 SEVERE SEPSIS (HCC): Primary | ICD-10-CM

## 2023-10-02 DIAGNOSIS — G63 POLYNEUROPATHY ASSOCIATED WITH UNDERLYING DISEASE (HCC): ICD-10-CM

## 2023-10-02 DIAGNOSIS — L03.119 CELLULITIS OF LOWER EXTREMITY, UNSPECIFIED LATERALITY: ICD-10-CM

## 2023-10-02 DIAGNOSIS — I26.99 ACUTE PULMONARY EMBOLISM, UNSPECIFIED PULMONARY EMBOLISM TYPE, UNSPECIFIED WHETHER ACUTE COR PULMONALE PRESENT (HCC): ICD-10-CM

## 2023-10-02 DIAGNOSIS — I10 PRIMARY HYPERTENSION: ICD-10-CM

## 2023-10-02 DIAGNOSIS — L97.508: ICD-10-CM

## 2023-10-02 PROCEDURE — 99306 1ST NF CARE HIGH MDM 50: CPT | Performed by: INTERNAL MEDICINE

## 2023-10-02 SDOH — ECONOMIC STABILITY - HOUSING INSECURITY: HOMELESSNESS UNSPECIFIED: Z59.00

## 2023-10-02 NOTE — PROGRESS NOTES
Outpatient Care Management referral via HRR report 10/2/23. Discharged to [facility] 9/30/23. Email sent to facility to inform them I will be following the patient during their skilled stay. This Admin Coordinator will continue to monitor via chart review.

## 2023-10-02 NOTE — UTILIZATION REVIEW
NOTIFICATION OF ADMISSION DISCHARGE   This is a Notification of Discharge from [facility]. Please be advised that this patient has been discharge from our facility. Below you will find the admission and discharge date and time including the patient’s disposition. UTILIZATION REVIEW CONTACT:  Lisa Chaparro  Utilization   Network Utilization Review Department  Phone: [phone] carefully listen to the prompts. All voicemails are confidential.  Email: [email or web address]     ADMISSION INFORMATION  PRESENTATION DATE: 9/23/2023 10:45 PM  OBERVATION ADMISSION DATE:   INPATIENT ADMISSION DATE: 9/24/23  5:49 AM   DISCHARGE DATE: 9/30/2023  5:13 PM   DISPOSITION:Released to SNF/TCU/SNU Facility    IMPORTANT INFORMATION:  Send all requests for admission clinical reviews, approved or denied determinations and any other requests to dedicated fax number below belonging to the campus where the patient is receiving treatment.  List of dedicated fax numbers:  [place] DENIALS (Administrative/Medical Necessity) [phone] [facility] [phone]   [facility] [phone]   [facility] [phone] [address] [facility] [phone]   [address] [place] [address] [phone]   [facility] [phone]   [address] [place], [place] [address]e [address] [place] [address] [phone]   [address] [place] [address] [phone]   [address] [facility] [phone]   [address] [facility] [address] [facility] [phone]

## 2023-10-02 NOTE — PROGRESS NOTES
[facility]  [address]  [place] [address]  [facility]    Nursing Home Admission    NAME: Edie Salazar  AGE: 63 y.o. SEX: female [id number]    DATE OF ENCOUNTER: 10-2-2023    Assessment/Plan:     Patient’s care was coordinated with nursing facility staff. Recent vitals, labs and updated medications were reviewed on [facility] system of facility. Past Medical, surgical, social, medication and allergy history and patient’s previous records reviewed.     1. Severe sepsis (HCC)        2. Cellulitis of lower extremity, unspecified laterality        3. Diabetic ulcer of foot associated with diabetes mellitus of other type, with other ulcer severity, unspecified laterality, unspecified part of foot (HCC)        4. Primary hypertension        5. Polyneuropathy associated with underlying disease (HCC)        6. Dyslipidemia        7. Homelessness        8. Polymyalgia rheumatica (HCC)        9. Acute pulmonary embolism, unspecified pulmonary embolism type, unspecified whether acute cor pulmonale present (HCC)             Severe sepsis (HCC)  Pt s/p sepsis thought to be related to pt's b/l LE cellulitis & b/l foot wounds infection  S/p lactic acidosis  CT b/l LE consistent with cellulitis  Cellulitis improving  Pt on 3 more days of po levaquin    Cellulitis of lower extremity  Pt with b/l cellulitis improving  S/p CT scan with BLE cellulitis, no osteo  Cont po levaquin for total of 10 day course    Diabetic foot ulcer (HCC)  Pt with hx of chronic diabetic foot ulcers  Pt was noncompliant with her dressing changes when she was discharged from [facility]; pt homeless  This past admission, pt was evaluated by podiatry who noted pt with purulent drainage from her diabetic ulcers  Cont course of levaquin  Consult inhouse wound care    Hypertension  BP stable  10/2/2023 23:52 117 / 60 mmHg   Cont amlodipine 5 mg 1 tab po daily  Cont hydralazine 10 mg 1 tab po TID  Cont  losartan 100 mg 1 tab po daily  Cont metoprolol tartrate 100 mg 1 tab po BID      Polyneuropathy associated with underlying disease (HCC)  Stable  Cont gabapentin 100 mg 1 tab po BID    Dyslipidemia  Stable  Cont atorvastatin 40 mg 1 tab po QHS    Homelessness  Consult Social work  Pt thinking about [facility] apts  PT/OT to eval/tx    Polymyalgia rheumatica (HCC)  Stable  Pt on prednisone 4 mg 1 tab po daily  F/u outpatient with rheumatology upon discharge from [facility]    Pulmonary embolism (HCC)  Pt with hx of recent acute pulmonary embolism & LE DVT in 08/2023  Cont eliquis 5 mg 1 tab po BID      Chief Complaint      Here for [facility]    HPI   Patient is a 63 y.o. female with PMR, DMII, Seizure d/o, CKD3a, Sickle cell trait, HTN, Hyperlipidemia and thyroid nodule.    Pt admitted to [facility] from 9/24/2023-9/30/2023 for severe sepsis due to DMII foot ulcer. Pt had myraid of complaints upon arrival to ED including b/l eye drainage, ambulatory dysfunction and b/l foot wounds. Pt then discharged to [facility] on 9/30/2023.    Pt seen and examined. I discussed with pt if she has any pain. She currently denies it. However pt is on prednisone low dose for her history of PMR. In addition, she has ulcers on her shins & Rt foot. Pt A&Ox3.     Past Medical History:   Diagnosis Date   • Anemia    • Benign hypertension     Procedure/Onset: 01/01/2005; Description: BP elevated today. Pt reports running out of BP meds 2wks ago. Will resume BP meds.   • Diabetes mellitus (HCC)    • Diabetes mellitus type 2 with complications, uncontrolled 9/8/2015   • Dyspnea on exertion    • Hyperlipidemia    • Hypertension    • Legally blind 2010   • Miscarriage     x 3   • Polymyalgia rheumatica (HCC) 11/24/2021   • Seizures ([facility])    • Sickle cell trait ([facility])    • Stage 3a chronic kidney disease (HCC) 5/19/2022   • Thyroid nodule 3/6/2020       Past Surgical History:   Procedure Laterality Date   • CATARACT EXTRACTION Bilateral     august  and september   • EYE SURGERY     • HYSTERECTOMY      37   • OOPHORECTOMY Left     37   • CO LIGATION/BIOPSY TEMPORAL ARTERY Bilateral 10/17/2019    Procedure: BIOPSY ARTERY TEMPORAL;  Surgeon: Bentley Reyes DO;  Location: [facility];  Service: Vascular   • US GUIDED THYROID BIOPSY  5/13/2020       Social History     Tobacco Use   Smoking Status Never   Smokeless Tobacco Never          Family History   Problem Relation Age of Onset   • Heart attack Mother    • Heart disease Mother    • Hypertension Mother    • No Known Problems Father    • Heart disease Sister    • No Known Problems Brother    • No Known Problems Son    • No Known Problems Daughter    • Heart attack Maternal Grandmother    • Heart disease Maternal Grandmother    • Diabetes Other    • Heart attack Other    • No Known Problems Sister    • No Known Problems Sister    • No Known Problems Paternal Aunt    • No Known Problems Son    • Cancer Neg Hx    • Stroke Neg Hx         No Known Allergies       Current Outpatient Medications:   •  acetaminophen (TYLENOL) 325 mg tablet, Take 650 mg by mouth every 6 (six) hours as needed for mild pain, Disp: , Rfl:   •  amLODIPine (NORVASC) 5 mg tablet, Take 1 tablet (5 mg total) by mouth daily, Disp: 30 tablet, Rfl: 3  •  apixaban (ELIQUIS) 5 mg, Take 1 tablet (5 mg total) by mouth 2 (two) times a day Do not start before August 17, 2023., Disp: , Rfl: 0  •  atorvastatin (LIPITOR) 40 mg tablet, Take 1 tablet (40 mg total) by mouth daily, Disp: 90 tablet, Rfl: 3  •  Blood Glucose Monitoring Suppl (OneTouch Verio Reflect) w/Device KIT, Use 1 each 4 (four) times a day (Patient not taking: Reported on 9/11/2023), Disp: 1 kit, Rfl: 0  •  Blood Pressure Monitor KIT, Check blood pressures BID (Patient not taking: Reported on 9/11/2023), Disp: 1 kit, Rfl: 0  •  cholecalciferol (VITAMIN D3) 1,000 units tablet, Take 2 tablets (2,000 Units total) by mouth daily, Disp: 60 tablet, Rfl: 2  •  Continuous Blood Gluc   (FreeStyle Denisa 2 [place]) ERIC, Use 1 each 4 (four) times a day (Patient not taking: Reported on 9/11/2023), Disp: 1 each, Rfl: 0  •  Continuous Blood Gluc Sensor (FreeStyle Denisa 2 Sensor) MISC, Use 1 each every 14 (fourteen) days (Patient not taking: Reported on 9/11/2023), Disp: 2 each, Rfl: 0  •  docusate sodium (COLACE) 100 mg capsule, Take 1 capsule (100 mg total) by mouth 2 (two) times a day, Disp: 60 capsule, Rfl: 0  •  Fluticasone-Salmeterol (Advair) 250-50 mcg/dose inhaler, Inhale 1 puff 2 (two) times a day Rinse mouth after use., Disp: , Rfl:   •  gabapentin (NEURONTIN) 100 mg capsule, Take 1 capsule (100 mg total) by mouth 2 (two) times a day, Disp: 60 capsule, Rfl: 3  •  hydrALAZINE (APRESOLINE) 10 mg tablet, Take 1 tablet (10 mg total) by mouth 3 (three) times a day, Disp: 90 tablet, Rfl: 3  •  insulin glargine (LANTUS) 100 units/mL subcutaneous injection, Inject 18 Units under the skin every 12 (twelve) hours, Disp: 10 mL, Rfl: 0  •  insulin lispro (HumaLOG) 100 units/mL injection, Inject 5 Units under the skin 3 (three) times a day with meals, Disp: 4.5 mL, Rfl: 0  •  Insulin Pen Needle (BD Pen Needle Tita 2nd Gen) 32G X 4 MM MISC, For use with insulin pen. Pharmacy may dispense brand covered by insurance. (Patient not taking: Reported on 9/11/2023), Disp: 100 each, Rfl: 0  •  Insulin Pen Needle (BD Pen Needle Tita U/F) 32G X 4 MM MISC, Use four times daily as directed with insulin pen (Patient not taking: Reported on 9/11/2023), Disp: 200 each, Rfl: 3  •  Lancets (OneTouch Delica Plus [id number]) MISC, Use 1 each 4 (four) times a day (Patient not taking: Reported on 9/11/2023), Disp: 200 each, Rfl: 2  •  levofloxacin (LEVAQUIN) 750 mg tablet, Take 1 tablet (750 mg total) by mouth every 24 hours for 3 days, Disp: , Rfl: 0  •  lidocaine (LIDODERM) 5 %, Apply 1 patch topically over 12 hours daily Remove & Discard patch within 12 hours or as directed by MD Do not start before October 1, 2023., Disp: ,  Rfl: 0  •  losartan (COZAAR) 100 MG tablet, Take 1 tablet (100 mg total) by mouth daily, Disp: 30 tablet, Rfl: 5  •  metFORMIN (GLUCOPHAGE) 1000 MG tablet, Take 1 tablet (1,000 mg total) by mouth 2 (two) times a day with meals, Disp: 60 tablet, Rfl: 3  •  metoprolol tartrate (LOPRESSOR) 100 mg tablet, Take 1 tablet (100 mg total) by mouth every 12 (twelve) hours, Disp: 60 tablet, Rfl: 3  •  multivitamin-iron-minerals-folic acid (THERAPEUTIC-M) TABS tablet, Take 1 tablet by mouth daily, Disp: 30 tablet, Rfl: 2  •  nystatin (MYCOSTATIN) powder, Apply topically 2 (two) times a day, Disp: , Rfl: 0  •  predniSONE 1 mg tablet, Take 4 tablets (4 mg total) by mouth daily, Disp: 120 tablet, Rfl: 0  •  topiramate (TOPAMAX) 100 mg tablet, Take 1 tablet (100 mg total) by mouth daily at bedtime (Patient taking differently: Take 100 mg by mouth daily at bedtime), Disp: 30 tablet, Rfl: 0    Updated list was reviewed in [facility] system of facility.     Vital signs were reviewed in [facility]    Review of Systems   Constitutional: Negative for chills and fever.   Musculoskeletal: Positive for arthralgias.   Skin: Positive for wound.   All other systems reviewed and are negative.      Physical Exam  Constitutional:       General: She is not in acute distress.     Appearance: She is not ill-appearing.   HENT:      Head: Normocephalic.   Cardiovascular:      Rate and Rhythm: Normal rate.      Pulses:           Dorsalis pedis pulses are 2+ on the right side and 2+ on the left side.      Heart sounds: Normal heart sounds. No murmur heard.  Pulmonary:      Effort: No respiratory distress.      Breath sounds: Normal breath sounds. No wheezing.   Abdominal:      General: Bowel sounds are normal. There is no distension.      Palpations: Abdomen is soft.      Tenderness: There is no abdominal tenderness.   Feet:      Comments: Diabetic foot ulcer underneath Rt foot (POA); see wound care photos from hospital  Neurological:       Mental Status: She is alert.   Psychiatric:         Mood and Affect: Mood normal.         Behavior: Behavior normal.         Thought Content: Thought content normal.         Judgment: Judgment normal.           Diagnostic Data     Recent labs and imaging studies were reviewed.     Code Status:    Full      Additional notes:    Total time spent on H&P 47 minutes in reviewing discharge paperwork from the hospital, interpreting test results from hospital medical records, and documenting information in the medical record. In addition, I provided counseling to the patient and encouraged them to work with physical therapy.      This note was electronically signed by Dr. Lamar De Leon  [facility]

## 2023-10-03 NOTE — ASSESSMENT & PLAN NOTE
Stable  Pt on prednisone 4 mg 1 tab po daily  F/u outpatient with rheumatology upon discharge from [facility]

## 2023-10-03 NOTE — ASSESSMENT & PLAN NOTE
Pt s/p sepsis thought to be related to pt's b/l LE cellulitis & b/l foot wounds infection  S/p lactic acidosis  CT b/l LE consistent with cellulitis  Cellulitis improving  Pt on 3 more days of po levaquin

## 2023-10-03 NOTE — ASSESSMENT & PLAN NOTE
Pt with hx of chronic diabetic foot ulcers  Pt was noncompliant with her dressing changes when she was discharged from [facility]; pt homeless  This past admission, pt was evaluated by podiatry who noted pt with purulent drainage from her diabetic ulcers  Cont course of levaquin  Consult inhouse wound care

## 2023-10-03 NOTE — ASSESSMENT & PLAN NOTE
Pt with b/l cellulitis improving  S/p CT scan with BLE cellulitis, no osteo  Cont po levaquin for total of 10 day course

## 2023-10-03 NOTE — ASSESSMENT & PLAN NOTE
BP stable  10/2/2023 23:52 117 / 60 mmHg   Cont amlodipine 5 mg 1 tab po daily  Cont hydralazine 10 mg 1 tab po TID  Cont losartan 100 mg 1 tab po daily  Cont metoprolol tartrate 100 mg 1 tab po BID

## 2023-10-03 NOTE — ASSESSMENT & PLAN NOTE
Consult Social work  [facility]  PT/OT to eval/tx

## 2023-10-05 ENCOUNTER — PATIENT OUTREACH (OUTPATIENT)
Dept: CASE MANAGEMENT | Facility: OTHER | Age: 63
End: 2023-10-05

## 2023-10-05 NOTE — PROGRESS NOTES
Chart review complete Update obtained from [facility]). The patient is currently admitted to the SNF and is receiving skilled therapies No LCD at this time. This Admin Coordinator will continue to monitor via chart review.

## 2023-10-09 ENCOUNTER — NURSING HOME VISIT (OUTPATIENT)
Dept: GERIATRICS | Facility: OTHER | Age: 63
End: 2023-10-09
Payer: COMMERCIAL

## 2023-10-09 VITALS
SYSTOLIC BLOOD PRESSURE: 124 MMHG | DIASTOLIC BLOOD PRESSURE: 64 MMHG | BODY MASS INDEX: 38.32 KG/M2 | TEMPERATURE: 97.9 F | HEART RATE: 72 BPM | OXYGEN SATURATION: 98 % | WEIGHT: 252 LBS | RESPIRATION RATE: 18 BRPM

## 2023-10-09 DIAGNOSIS — R45.89 DEPRESSED MOOD: ICD-10-CM

## 2023-10-09 DIAGNOSIS — E78.5 DYSLIPIDEMIA: ICD-10-CM

## 2023-10-09 DIAGNOSIS — E11.65 TYPE 2 DIABETES MELLITUS WITH HYPERGLYCEMIA, WITH LONG-TERM CURRENT USE OF INSULIN (HCC): ICD-10-CM

## 2023-10-09 DIAGNOSIS — E11.42 DIABETIC POLYNEUROPATHY ASSOCIATED WITH TYPE 2 DIABETES MELLITUS (HCC): Primary | ICD-10-CM

## 2023-10-09 DIAGNOSIS — N18.31 STAGE 3A CHRONIC KIDNEY DISEASE (HCC): ICD-10-CM

## 2023-10-09 DIAGNOSIS — I10 PRIMARY HYPERTENSION: ICD-10-CM

## 2023-10-09 DIAGNOSIS — R26.2 AMBULATORY DYSFUNCTION: ICD-10-CM

## 2023-10-09 DIAGNOSIS — Z79.4 TYPE 2 DIABETES MELLITUS WITH HYPERGLYCEMIA, WITH LONG-TERM CURRENT USE OF INSULIN (HCC): ICD-10-CM

## 2023-10-09 PROCEDURE — 99309 SBSQ NF CARE MODERATE MDM 30: CPT

## 2023-10-09 NOTE — ASSESSMENT & PLAN NOTE
Lab Results   Component Value Date    EGFR 78 09/28/2023    EGFR 74 09/26/2023    EGFR 79 09/25/2023    CREATININE 0.80 09/28/2023    CREATININE 0.83 09/26/2023    CREATININE 0.79 09/25/2023     · Baseline creatinine 0.8-0.9  · Continue to monitor BMP periodically  · Encourage fluids throughout the day  · Avoid nephrotoxic medications

## 2023-10-09 NOTE — ASSESSMENT & PLAN NOTE
Lab Results   Component Value Date    HGBA1C 10.2 (H) 08/24/2023     Encourage CCD  AC HS blood glucose checks  Blood Glucose Checks:  10/09 B-145 L-183 D-160 HS-  10/08 B-219 L-86 D-319 HS-133  10/07 B-97 L-125 D-103 HS-111  Avoid hypoglycemia  Hypoglycemia protocol  · Continue lantus SQ 18 units Q12 hours  · Continue humalog SQ 5 units with meals

## 2023-10-09 NOTE — ASSESSMENT & PLAN NOTE
• Encourage medication adherence   • Continue daily blood pressure checks  • Encourage heart healthy diet  • Continue hydralazine 10 mg po TID  • Continue losartan 100 mg po daily  • Continue amlodipine 5 mg po daily  • Continue metoprolol tartrate 100 mg Q12 hours  • Blood pressures stable

## 2023-10-09 NOTE — ASSESSMENT & PLAN NOTE
• Continue PT/OT  • Encourage use of assistive device  o Continue to use wheelchair  o Goal to use walker  • Able to ambulate 45-50 feet with therapy  • Continue fall precautions  • Encourage patient to participate with activities  • Provide education for patient, family, and caregivers

## 2023-10-09 NOTE — ASSESSMENT & PLAN NOTE
Lab Results   Component Value Date    HGBA1C 10.2 (H) 08/24/2023     · Stable at this time  · Continue gabapentin 100 mg po BID

## 2023-10-09 NOTE — ASSESSMENT & PLAN NOTE
· lipid panel (04/19/23)  · cholesterol-152  · triglyceride-140  · LDL-94  · HDL-30  · Continue atorvastatin 40 mg po daily  · Recommend repeat lipid panel's yearly

## 2023-10-09 NOTE — PROGRESS NOTES
[address], [address], [address] [facility] [address], [address]  [phone]    NAME: Reymundo Walker  AGE: 70 y.o.  SEX: female    Progress Note    Location: [facility]  POS: 32 (SNF)  Code Status: Full Code    Chief complaint / Reason for visit:  Follow-up visit    Assessment/Plan:    Dyslipidemia  · lipid panel (04/19/23)  · cholesterol-152  · triglyceride-140  · LDL-94  · HDL-30  · Continue atorvastatin 40 mg po daily  · Recommend repeat lipid panel's yearly    Depressed mood  · Mood stable at time of assessment  · Very interactive  · Able to express needs  · Continue supportive care while at short-term rehab  · Encourage participation in group activities   · Continue to monitor     Ambulatory dysfunction  • Continue PT/OT  • Encourage use of assistive device  o Continue to use wheelchair  o Goal to use walker  • Able to ambulate 45-50 feet with therapy  • Continue fall precautions  • Encourage patient to participate with activities  • Provide education for patient, family, and caregivers    Stage 3a chronic kidney disease ([address])  Lab Results   Component Value Date    EGFR 78 09/28/2023    EGFR 74 09/26/2023    EGFR 79 09/25/2023    CREATININE 0.80 09/28/2023    CREATININE 0.83 09/26/2023    CREATININE 0.79 09/25/2023     · Baseline creatinine 0.8-0.9  · Continue to monitor BMP periodically  · Encourage fluids throughout the day  · Avoid nephrotoxic medications    Hypertension  • Encourage medication adherence   • Continue daily blood pressure checks  • Encourage heart healthy diet  • Continue hydralazine 10 mg po TID  • Continue losartan 100 mg po daily  • Continue amlodipine 5 mg po daily  • Continue metoprolol tartrate 100 mg Q12 hours  • Blood pressures stable    Type 2 diabetes mellitus with hyperglycemia, with long-term current use of insulin (HCC)    Lab Results   Component Value Date    HGBA1C 10.2 (H) 08/24/2023     Encourage CCD  AC HS blood glucose checks  Blood Glucose Checks:  10/09 B-145 L-183 D-160 HS-  10/08 B-219 L-86 D-319 HS-133  10/07 B-97 L-125 D-103 HS-111  Avoid hypoglycemia  Hypoglycemia protocol  · Continue lantus SQ 18 units Q12 hours  · Continue humalog SQ 5 units with meals    Diabetic neuropathy (HCC)    Lab Results   Component Value Date    HGBA1C 10.2 (H) 08/24/2023     · Stable at this time  · Continue gabapentin 100 mg po BID      This is a 70 y.o. female seen today at [facility]. Medical history includes, not limited to, type 2 DM, polymyalgia rheumatica, CKD stage 3A, hypertension, hyperlipidemia, and vitamin D deficiency. Resident with recent hospitalization from 09/23-09/30. She presented to the ED with sepsis secondary to cellulitits steming from a diabetic foot ulcer. While hospitalized she was evaluated by podiatry and treated with IV zosyn. She was discharged to [facility] for short term rehab. Upon entering resident's room she is out of bed in the wheelchair, legs elevated up on her bed. She is alert and oriented x 3, able to answer all questions appropriately. She currently denies pain. States she is fatigued as she just finished therapy this morning. She states she is able to walk more, completing roughly 45-50 feet with the walker. She states when she is in bed she will do exercises, especially first thing in the morning to warm up her legs. She states she has pain in her legs early in the morning when it is cold outside. We spoke of adding biofreeze gel as needed, she could apply before therapy in the morning to help with some of the pain. She is agreeable. No other concerns at this time. Nursing and prior provider notes reviewed on this visit. Discussed visit with PCP and nursing staff/ supervisor. Review of Systems   Constitutional: Positive for activity change. Negative for appetite change, fatigue, fever and unexpected weight change. HENT: Negative for congestion.     Eyes: Negative for pain, discharge and visual disturbance. Respiratory: Negative for cough and shortness of breath. Cardiovascular: Negative for chest pain and palpitations. Gastrointestinal: Negative for abdominal pain, constipation and diarrhea. Genitourinary: Negative for difficulty urinating, dysuria, hematuria and urgency. Musculoskeletal: Positive for arthralgias and gait problem. Skin: Negative for color change. Neurological: Negative for syncope and weakness. Psychiatric/Behavioral: Positive for sleep disturbance. Negative for confusion. All other systems reviewed and are negative. ALLERGY: Reviewed, unchanged  No Known Allergies    HISTORY:  Medical Hx: Reviewed, unchanged  Family Hx: Reviewed, unchanged  Soc Hx: Reviewed,  unchanged      Physical Exam  Vitals reviewed. Constitutional:       General: She is not in acute distress. Appearance: Normal appearance. She is not ill-appearing. HENT:      Head: Normocephalic. Right Ear: Tympanic membrane normal.      Left Ear: Tympanic membrane normal.      Nose: Nose normal. No congestion. Mouth/Throat:      Mouth: Mucous membranes are moist.      Pharynx: Oropharynx is clear. Eyes:      General:         Right eye: No discharge. Left eye: No discharge. Extraocular Movements: Extraocular movements intact. Conjunctiva/sclera: Conjunctivae normal.      Pupils: Pupils are equal, round, and reactive to light. Cardiovascular:      Rate and Rhythm: Normal rate and regular rhythm. Pulses: Normal pulses. Heart sounds: Normal heart sounds. Pulmonary:      Effort: Pulmonary effort is normal. No respiratory distress. Breath sounds: Normal breath sounds. Chest:      Chest wall: No tenderness. Abdominal:      General: Bowel sounds are normal.      Palpations: Abdomen is soft. Tenderness: There is no abdominal tenderness. Musculoskeletal:         General: No swelling. Normal range of motion.       Cervical back: Normal range of motion. Right lower leg: No edema. Left lower leg: No edema. Skin:     General: Skin is warm and dry. Neurological:      Mental Status: She is alert and oriented to person, place, and time. Mental status is at baseline. Motor: No weakness. Psychiatric:         Mood and Affect: Mood normal.         Behavior: Behavior normal.         Thought Content: Thought content normal.         Judgment: Judgment normal.            Laboratory results / Imaging reviewed: Hard copy/ies in medical chart:    Vitals:    10/09/23 1141   BP: 124/64   Pulse: 72   Resp: 18   Temp: 97.9 °F (36.6 °C)   SpO2: 98%       Current Medications: All medications reviewed and updated in Nursing Home Chart    Please note: This note was completed in part utilizing a voice-recognition software may have been used in the preparation of this document. Grammatical errors, random word insertion, spelling mistakes, and incomplete sentences may be an occasional consequence of the system secondary to software limitations, ambient noise and hardware issues. Occasional wrong word or "sound-alike" substitutions may have occurred due to the inherent limitations of voice recognition software. At the time of dictation, efforts were made to edit, clarify and/or correct errors. Interpretation should be guided by context. Please read the chart carefully and recognize, using context, where substitutions have occurred. If you have any questions or concerns about the context, text or information contained within the body of this dictation, please contact myself, the provider, for further clarification.       CONCETTA Lim  10/9/2023

## 2023-10-10 LAB
DME PARACHUTE DELIVERY DATE ACTUAL: NORMAL
DME PARACHUTE DELIVERY DATE REQUESTED: NORMAL
DME PARACHUTE ITEM DESCRIPTION: NORMAL
DME PARACHUTE ORDER STATUS: NORMAL
DME PARACHUTE SUPPLIER NAME: NORMAL
DME PARACHUTE SUPPLIER PHONE: NORMAL

## 2023-10-12 ENCOUNTER — VBI (OUTPATIENT)
Dept: ADMINISTRATIVE | Facility: OTHER | Age: 63
End: 2023-10-12

## 2023-10-12 ENCOUNTER — PATIENT OUTREACH (OUTPATIENT)
Dept: CASE MANAGEMENT | Facility: OTHER | Age: 63
End: 2023-10-12

## 2023-10-12 ENCOUNTER — NURSING HOME VISIT (OUTPATIENT)
Dept: GERIATRICS | Facility: OTHER | Age: 63
End: 2023-10-12
Payer: COMMERCIAL

## 2023-10-12 DIAGNOSIS — N18.31 STAGE 3A CHRONIC KIDNEY DISEASE (HCC): ICD-10-CM

## 2023-10-12 DIAGNOSIS — E11.42 DIABETIC POLYNEUROPATHY ASSOCIATED WITH TYPE 2 DIABETES MELLITUS (HCC): ICD-10-CM

## 2023-10-12 DIAGNOSIS — Z79.4 TYPE 2 DIABETES MELLITUS WITH HYPERGLYCEMIA, WITH LONG-TERM CURRENT USE OF INSULIN (HCC): Primary | ICD-10-CM

## 2023-10-12 DIAGNOSIS — E11.65 TYPE 2 DIABETES MELLITUS WITH HYPERGLYCEMIA, WITH LONG-TERM CURRENT USE OF INSULIN (HCC): Primary | ICD-10-CM

## 2023-10-12 DIAGNOSIS — R26.2 AMBULATORY DYSFUNCTION: ICD-10-CM

## 2023-10-12 DIAGNOSIS — E78.5 DYSLIPIDEMIA: ICD-10-CM

## 2023-10-12 DIAGNOSIS — I10 PRIMARY HYPERTENSION: ICD-10-CM

## 2023-10-12 PROCEDURE — 99309 SBSQ NF CARE MODERATE MDM 30: CPT

## 2023-10-12 NOTE — PROGRESS NOTES
Chart review complete Update obtained from [facility]). The patient is currently admitted to the SNF and is receiving skilled therapies No LCD at this time. This Admin Coordinator will continue to monitor via chart review.

## 2023-10-16 ENCOUNTER — NURSING HOME VISIT (OUTPATIENT)
Dept: GERIATRICS | Facility: OTHER | Age: 63
End: 2023-10-16
Payer: COMMERCIAL

## 2023-10-16 VITALS
DIASTOLIC BLOOD PRESSURE: 67 MMHG | OXYGEN SATURATION: 98 % | TEMPERATURE: 97.9 F | RESPIRATION RATE: 18 BRPM | BODY MASS INDEX: 38.64 KG/M2 | SYSTOLIC BLOOD PRESSURE: 129 MMHG | WEIGHT: 254.1 LBS | HEART RATE: 70 BPM

## 2023-10-16 VITALS
BODY MASS INDEX: 38.32 KG/M2 | HEART RATE: 72 BPM | OXYGEN SATURATION: 98 % | WEIGHT: 252 LBS | DIASTOLIC BLOOD PRESSURE: 64 MMHG | SYSTOLIC BLOOD PRESSURE: 124 MMHG | RESPIRATION RATE: 18 BRPM

## 2023-10-16 DIAGNOSIS — E11.65 TYPE 2 DIABETES MELLITUS WITH HYPERGLYCEMIA, WITH LONG-TERM CURRENT USE OF INSULIN (HCC): ICD-10-CM

## 2023-10-16 DIAGNOSIS — A41.9 SEVERE SEPSIS (HCC): Primary | ICD-10-CM

## 2023-10-16 DIAGNOSIS — R26.2 AMBULATORY DYSFUNCTION: ICD-10-CM

## 2023-10-16 DIAGNOSIS — R65.20 SEVERE SEPSIS (HCC): Primary | ICD-10-CM

## 2023-10-16 DIAGNOSIS — Z79.4 TYPE 2 DIABETES MELLITUS WITH HYPERGLYCEMIA, WITH LONG-TERM CURRENT USE OF INSULIN (HCC): ICD-10-CM

## 2023-10-16 DIAGNOSIS — I10 PRIMARY HYPERTENSION: ICD-10-CM

## 2023-10-16 PROCEDURE — 99309 SBSQ NF CARE MODERATE MDM 30: CPT

## 2023-10-16 NOTE — ASSESSMENT & PLAN NOTE
Recent hospitalization from 09/23-09/30  Sepsis secondary to cellulitis related to diabetic foot ulcer  Treated with IV and oral antibiotics  Continue to monitor   Vitals signs stable

## 2023-10-16 NOTE — ASSESSMENT & PLAN NOTE
Lab Results   Component Value Date    HGBA1C 10.2 (H) 08/24/2023     Encourage CCD  AC HS blood glucose checks  Blood Glucose Checks:  10/16 B-69 L-197 D-149 HS-  10/15 B-135 L-159 D-173 HS-207  10/14 B-80 L-124 D-116 HS-190  Avoid hypoglycemia  Hypoglycemia protocol  Continue lantus SQ 18 units Q12 hours  Continue humalog SQ 5 units with meals  Monitor morning blood sugars as they tend to be lower  If consistent or symptomatic may need to decrease bedtime lantus

## 2023-10-16 NOTE — ASSESSMENT & PLAN NOTE
Lab Results   Component Value Date    EGFR 78 09/28/2023    EGFR 74 09/26/2023    EGFR 79 09/25/2023    CREATININE 0.80 09/28/2023    CREATININE 0.83 09/26/2023    CREATININE 0.79 09/25/2023     Baseline creatinine 0.8-0.9  Continue to monitor BMP periodically  Encourage fluids throughout the day  Avoid nephrotoxic medications

## 2023-10-16 NOTE — PROGRESS NOTES
[place] [address] [address] [facility], [address], [address]  [phone]    NAME: José Miguel Hill  AGE: 70 y.o.  SEX: female    Progress Note    Location: [facility]  POS: 31 (SNF)  Code Status: Full Code    Chief complaint / Reason for visit:  Follow-up visit    Assessment/Plan:    Diabetic neuropathy ([address])  Stable at this time  Continue gabapentin 100 mg po BID    Type 2 diabetes mellitus with hyperglycemia, with long-term current use of insulin ([address])    Lab Results   Component Value Date    HGBA1C 10.2 (H) 08/24/2023     Encourage CCD  AC HS blood glucose checks  Blood Glucose Checks:  10/12 B-103 L-99 D-113 HS-185  10/11 B-75 L-171 D-183 HS-181  10/10 B-90 L-120 D-186 HS-180  Avoid hypoglycemia  Hypoglycemia protocol  Continue lantus SQ 18 units Q12 hours  Continue humalog SQ 5 units with meals    Hypertension  Encourage medication adherence   Continue daily blood pressure checks  Encourage heart healthy diet  Continue hydralazine 10 mg po TID  Continue losartan 100 mg po daily  Continue amlodipine 5 mg po daily  Continue metoprolol tartrate 100 mg Q12 hours  Blood pressures stable    Stage 3a chronic kidney disease ([address])  Lab Results   Component Value Date    EGFR 78 09/28/2023    EGFR 74 09/26/2023    EGFR 79 09/25/2023    CREATININE 0.80 09/28/2023    CREATININE 0.83 09/26/2023    CREATININE 0.79 09/25/2023     Baseline creatinine 0.8-0.9  Continue to monitor BMP periodically  Encourage fluids throughout the day  Avoid nephrotoxic medications    Ambulatory dysfunction  Continue PT/OT  Encourage use of assistive device  Continue to use wheelchair  Goal to use walker  Continue fall precautions  Pain control to bilateral knees  Biofreeze 4% external gel prn  Encourage patient to participate with activities  Provide education for patient, family, and caregivers    Dyslipidemia  lipid panel (04/19/23)  cholesterol-152  triglyceride-140  LDL-94  HDL-30  Continue atorvastatin 40 mg po daily  Recommend repeat lipid panel's yearly    This is a 70 y.o. female seen today at [facility]. Medical history includes, not limited to, type 2 DM, polymyalgia rheumatica, CKD stage 3A, hypertension, hyperlipidemia, and vitamin D deficiency. Resident with recent hospitalization from 09/23-09/30. She presented to the ED with sepsis secondary to cellulitits steming from a diabetic foot ulcer. While hospitalized she was evaluated by podiatry and treated with IV zosyn. She was discharged to [facility] for short term rehab. Upon entering resident's room she is out of bed in the wheelchair. She is alert and oriented x 3, able to answer all questions appropriately. She currently states she is fatigued and has pain in her knees which inhibited her working with therapy as well as she had hoped. Denies N/V. States she has been eating well. Denies problems with bowel or bladder. Spoke with nursing staff regarding resident. Asked if they could apply the biofreeze to her bilateral knees. Nursing and prior provider notes reviewed on this visit. Discussed visit with PCP and nursing staff/ supervisor. Review of Systems   Constitutional:  Positive for activity change and fatigue. Negative for appetite change, fever and unexpected weight change. HENT:  Negative for congestion. Eyes:  Negative for pain, discharge and visual disturbance. Respiratory:  Negative for cough and shortness of breath. Cardiovascular:  Negative for chest pain and palpitations. Gastrointestinal:  Negative for abdominal pain, constipation and diarrhea. Genitourinary:  Negative for difficulty urinating, dysuria, hematuria and urgency. Musculoskeletal:  Positive for arthralgias and gait problem. Skin:  Negative for color change. Neurological:  Negative for syncope and weakness. Psychiatric/Behavioral:  Negative for confusion and sleep disturbance.     All other systems reviewed and are negative. ALLERGY: Reviewed, unchanged  No Known Allergies    HISTORY:  Medical Hx: Reviewed, unchanged  Family Hx: Reviewed, unchanged  Soc Hx: Reviewed,  unchanged      Physical Exam  Vitals reviewed. Constitutional:       General: She is not in acute distress. Appearance: Normal appearance. She is obese. She is not ill-appearing. HENT:      Head: Normocephalic. Right Ear: Tympanic membrane normal.      Left Ear: Tympanic membrane normal.      Nose: Nose normal. No congestion. Mouth/Throat:      Mouth: Mucous membranes are moist.      Pharynx: Oropharynx is clear. Eyes:      General:         Right eye: No discharge. Left eye: No discharge. Extraocular Movements: Extraocular movements intact. Conjunctiva/sclera: Conjunctivae normal.      Pupils: Pupils are equal, round, and reactive to light. Cardiovascular:      Rate and Rhythm: Normal rate and regular rhythm. Pulses: Normal pulses. Heart sounds: Normal heart sounds. Pulmonary:      Effort: Pulmonary effort is normal. No respiratory distress. Breath sounds: Normal breath sounds. Chest:      Chest wall: No tenderness. Abdominal:      General: Bowel sounds are normal.      Palpations: Abdomen is soft. Tenderness: There is no abdominal tenderness. Musculoskeletal:         General: No swelling. Normal range of motion. Cervical back: Normal range of motion. Right lower leg: Edema present. Left lower leg: Edema present. Skin:     General: Skin is warm and dry. Neurological:      Mental Status: She is alert and oriented to person, place, and time. Mental status is at baseline. Motor: No weakness. Psychiatric:         Mood and Affect: Mood normal.         Behavior: Behavior normal.         Thought Content:  Thought content normal.         Judgment: Judgment normal.            Laboratory results / Imaging reviewed: Hard copy/ies in medical chart:    Vitals:    10/12/23 1857 BP: 124/64   Pulse: 72   Resp: 18   SpO2: 98%       Current Medications: All medications reviewed and updated in Nursing Home Chart    Please note: This note was completed in part utilizing a voice-recognition software may have been used in the preparation of this document. Grammatical errors, random word insertion, spelling mistakes, and incomplete sentences may be an occasional consequence of the system secondary to software limitations, ambient noise and hardware issues. Occasional wrong word or "sound-alike" substitutions may have occurred due to the inherent limitations of voice recognition software. At the time of dictation, efforts were made to edit, clarify and/or correct errors. Interpretation should be guided by context. Please read the chart carefully and recognize, using context, where substitutions have occurred. If you have any questions or concerns about the context, text or information contained within the body of this dictation, please contact myself, the provider, for further clarification.       CONCETTA Robles  10/16/2023

## 2023-10-16 NOTE — ASSESSMENT & PLAN NOTE
Encourage medication adherence   Continue daily blood pressure checks  Encourage heart healthy diet  Continue hydralazine 10 mg po TID  Continue losartan 100 mg po daily  Continue amlodipine 5 mg po daily  Continue metoprolol tartrate 100 mg Q12 hours  Blood pressures stable

## 2023-10-16 NOTE — ASSESSMENT & PLAN NOTE
Continue PT/OT  Encourage use of assistive device  Continue to use wheelchair  Goal to use walker  Continue fall precautions  Pain control to bilateral knees  Biofreeze 4% external gel scheduled TID  Encourage patient to participate with activities  Provide education for patient, family, and caregivers

## 2023-10-16 NOTE — ASSESSMENT & PLAN NOTE
Continue PT/OT  Encourage use of assistive device  Continue to use wheelchair  Goal to use walker  Continue fall precautions  Pain control to bilateral knees  Biofreeze 4% external gel prn  Encourage patient to participate with activities  Provide education for patient, family, and caregivers

## 2023-10-16 NOTE — ASSESSMENT & PLAN NOTE
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 Rep stated they have what is needed from our office. Pt can call customer service at [phone] to check status

## 2023-10-16 NOTE — ASSESSMENT & PLAN NOTE
lipid panel (04/19/23)  cholesterol-152  triglyceride-140  LDL-94  HDL-30  Continue atorvastatin 40 mg po daily  Recommend repeat lipid panel's yearly

## 2023-10-16 NOTE — ASSESSMENT & PLAN NOTE
Lab Results   Component Value Date    HGBA1C 10.2 (H) 08/24/2023     Encourage CCD  AC HS blood glucose checks  Blood Glucose Checks:  10/12 B-103 L-99 D-113 HS-185  10/11 B-75 L-171 D-183 HS-181  10/10 B-90 L-120 D-186 HS-180  Avoid hypoglycemia  Hypoglycemia protocol  Continue lantus SQ 18 units Q12 hours  Continue humalog SQ 5 units with meals

## 2023-10-16 NOTE — PROGRESS NOTES
[facility]  [address] [facility] [address], [address], [facility], [address]  [phone]    NAME: Hannah Welch  AGE: 70 y.o. SEX: female    Progress Note    Location: [facility]  POS: 32 ([facility])  Code Status: Full Code    Chief complaint / Reason for visit:  Follow-up visit    Assessment/Plan:    Severe sepsis ([address])  Recent hospitalization from 09/23-09/30  Sepsis secondary to cellulitis related to diabetic foot ulcer  Treated with IV and oral antibiotics  Continue to monitor   Vitals signs stable    Ambulatory dysfunction  Continue PT/OT  Encourage use of assistive device  Continue to use wheelchair  Goal to use walker  Continue fall precautions  Pain control to bilateral knees  Biofreeze 4% external gel scheduled TID  Encourage patient to participate with activities  Provide education for patient, family, and caregivers    Hypertension  Encourage medication adherence   Continue daily blood pressure checks  Encourage heart healthy diet  Continue hydralazine 10 mg po TID  Continue losartan 100 mg po daily  Continue amlodipine 5 mg po daily  Continue metoprolol tartrate 100 mg Q12 hours  Blood pressures stable    Type 2 diabetes mellitus with hyperglycemia, with long-term current use of insulin ([facility])    Lab Results   Component Value Date    HGBA1C 10.2 (H) 08/24/2023     Encourage CCD  AC HS blood glucose checks  Blood Glucose Checks:  10/16 B-69 L-197 D-149 HS-  10/15 B-135 L-159 D-173 HS-207  10/14 B-80 L-124 D-116 HS-190  Avoid hypoglycemia  Hypoglycemia protocol  Continue lantus SQ 18 units Q12 hours  Continue humalog SQ 5 units with meals  Monitor morning blood sugars as they tend to be lower  If consistent or symptomatic may need to decrease bedtime lantus         This is a 70 y.o. female seen today at [facility]. Medical history includes, not limited to, type 2 DM, polymyalgia rheumatica, CKD stage 3A, hypertension, hyperlipidemia, and vitamin D deficiency.     Resident with recent hospitalization from 09/23-09/30. She presented to the ED with sepsis secondary to cellulitits steming from a diabetic foot ulcer. While hospitalized she was evaluated by podiatry and treated with IV zosyn. She was discharged to [facility] for short term rehab. Upon entering resident's room she is out of bed in the wheelchair. She is alert and oriented x 3, able to answer all questions appropriately. She is in better spirits than when I saw her last week. She states she has been working with therapy, ambulating with the walker. She has been able to do her morning exercises. Currently denies pain. Spoke of scheduling her biofreeze to her bilateral knees which she is agreeable to, she understands that if she does not feel she needs it she can refuse. Denies N/V. States she has been eating well. Denies problems with bowel or bladder. Spoke with nursing staff regarding resident. No concerns at this time. Nursing and prior provider notes reviewed on this visit. Discussed visit with PCP and nursing staff/ supervisor. Review of Systems   Constitutional:  Positive for activity change. Negative for appetite change, fatigue, fever and unexpected weight change. HENT:  Negative for congestion. Eyes:  Negative for pain, discharge and visual disturbance. Respiratory:  Negative for cough and shortness of breath. Cardiovascular:  Negative for chest pain and palpitations. Gastrointestinal:  Negative for abdominal pain, constipation and diarrhea. Genitourinary:  Negative for difficulty urinating, dysuria, hematuria and urgency. Musculoskeletal:  Positive for arthralgias and gait problem. Skin:  Negative for color change. Neurological:  Negative for syncope and weakness. Psychiatric/Behavioral:  Negative for confusion and sleep disturbance. All other systems reviewed and are negative.          ALLERGY: Reviewed, unchanged  No Known Allergies    HISTORY:  Medical Hx: Reviewed, unchanged  Family Hx: Reviewed, unchanged  Soc Hx: Reviewed,  unchanged      Physical Exam  Vitals reviewed. Constitutional:       General: She is not in acute distress. Appearance: Normal appearance. She is not ill-appearing. HENT:      Head: Normocephalic. Right Ear: Tympanic membrane normal.      Left Ear: Tympanic membrane normal.      Nose: Nose normal. No congestion. Mouth/Throat:      Mouth: Mucous membranes are moist.      Pharynx: Oropharynx is clear. Eyes:      General:         Right eye: No discharge. Left eye: No discharge. Extraocular Movements: Extraocular movements intact. Conjunctiva/sclera: Conjunctivae normal.      Pupils: Pupils are equal, round, and reactive to light. Cardiovascular:      Rate and Rhythm: Normal rate and regular rhythm. Pulses: Normal pulses. Heart sounds: Normal heart sounds. Pulmonary:      Effort: Pulmonary effort is normal. No respiratory distress. Breath sounds: Normal breath sounds. Chest:      Chest wall: No tenderness. Abdominal:      General: Bowel sounds are normal.      Palpations: Abdomen is soft. Tenderness: There is no abdominal tenderness. Musculoskeletal:         General: No swelling. Normal range of motion. Cervical back: Normal range of motion. Right lower leg: No edema. Left lower leg: No edema. Skin:     General: Skin is warm and dry. Neurological:      Mental Status: She is alert and oriented to person, place, and time. Mental status is at baseline. Motor: No weakness. Psychiatric:         Mood and Affect: Mood normal.         Behavior: Behavior normal.         Thought Content: Thought content normal.         Judgment: Judgment normal.            Laboratory results / Imaging reviewed: Hard copy/ies in medical chart:    Vitals:    10/16/23 1915   BP: 129/67   Pulse: 70   Resp: 18   Temp: 97.9 °F (36.6 °C)   SpO2: 98%       Current Medications:   All medications reviewed and updated in Nursing Home Chart    Please note: This note was completed in part utilizing a voice-recognition software may have been used in the preparation of this document. Grammatical errors, random word insertion, spelling mistakes, and incomplete sentences may be an occasional consequence of the system secondary to software limitations, ambient noise and hardware issues. Occasional wrong word or "sound-alike" substitutions may have occurred due to the inherent limitations of voice recognition software. At the time of dictation, efforts were made to edit, clarify and/or correct errors. Interpretation should be guided by context. Please read the chart carefully and recognize, using context, where substitutions have occurred. If you have any questions or concerns about the context, text or information contained within the body of this dictation, please contact myself, the provider, for further clarification.       CONCETTA Presley  10/16/2023

## 2023-10-19 ENCOUNTER — PATIENT OUTREACH (OUTPATIENT)
Dept: CASE MANAGEMENT | Facility: OTHER | Age: 63
End: 2023-10-19

## 2023-10-19 ENCOUNTER — NURSING HOME VISIT (OUTPATIENT)
Dept: GERIATRICS | Facility: OTHER | Age: 63
End: 2023-10-19

## 2023-10-19 VITALS
HEART RATE: 72 BPM | RESPIRATION RATE: 20 BRPM | DIASTOLIC BLOOD PRESSURE: 68 MMHG | OXYGEN SATURATION: 97 % | BODY MASS INDEX: 38.64 KG/M2 | TEMPERATURE: 97.9 F | WEIGHT: 254.1 LBS | SYSTOLIC BLOOD PRESSURE: 120 MMHG

## 2023-10-19 DIAGNOSIS — R60.0 BILATERAL LOWER EXTREMITY EDEMA: ICD-10-CM

## 2023-10-19 DIAGNOSIS — Z59.00 HOMELESSNESS: ICD-10-CM

## 2023-10-19 DIAGNOSIS — I10 PRIMARY HYPERTENSION: ICD-10-CM

## 2023-10-19 DIAGNOSIS — E11.42 DIABETIC POLYNEUROPATHY ASSOCIATED WITH TYPE 2 DIABETES MELLITUS (HCC): ICD-10-CM

## 2023-10-19 DIAGNOSIS — E11.65 TYPE 2 DIABETES MELLITUS WITH HYPERGLYCEMIA, WITH LONG-TERM CURRENT USE OF INSULIN (HCC): Primary | ICD-10-CM

## 2023-10-19 DIAGNOSIS — Z79.4 TYPE 2 DIABETES MELLITUS WITH HYPERGLYCEMIA, WITH LONG-TERM CURRENT USE OF INSULIN (HCC): Primary | ICD-10-CM

## 2023-10-19 DIAGNOSIS — R26.2 AMBULATORY DYSFUNCTION: ICD-10-CM

## 2023-10-19 RX ORDER — MENTHOL 40 MG/ML
1 GEL TOPICAL 2 TIMES DAILY
COMMUNITY

## 2023-10-19 SDOH — ECONOMIC STABILITY - HOUSING INSECURITY: HOMELESSNESS UNSPECIFIED: Z59.00

## 2023-10-19 NOTE — PROGRESS NOTES
[address], [address], [address], [address]  [phone]    NAME: Queta Mcdaniel  AGE: 70 y.o. SEX: female    Progress Note    Location: [facility]  POS: 32 (SNF)  Code Status: Full Code    Chief complaint / Reason for visit:  Follow-up visit    Assessment/Plan:    Homelessness  Tentative discharge plan is to transition to [employer]    Bilateral lower extremity edema  Encourage elevation of legs when laying in bed  Weight stable    Ambulatory dysfunction  Continue PT/OT  Encourage use of assistive device  Continue to use wheelchair  Goal to use walker  Continue fall precautions  Pain control to bilateral knees  Biofreeze 4% external gel scheduled TID  Encourage patient to participate with activities  Provide education for patient, family, and caregivers    Hypertension  Encourage medication adherence   Continue daily blood pressure checks  Encourage heart healthy diet  Continue hydralazine 10 mg po TID  Continue losartan 100 mg po daily  Continue amlodipine 5 mg po daily  Continue metoprolol tartrate 100 mg Q12 hours  Blood pressures stable    Type 2 diabetes mellitus with hyperglycemia, with long-term current use of insulin (HCC)    Lab Results   Component Value Date    HGBA1C 10.2 (H) 08/24/2023     Encourage CCD  AC HS blood glucose checks  Blood Glucose reviewed  stable  Avoid hypoglycemia  Hypoglycemia protocol  Continue lantus SQ 18 units Q12 hours  Continue humalog SQ 5 units with meals  Monitor morning blood sugars as they tend to be lower  If consistent or symptomatic may need to decrease bedtime lantus     Diabetic neuropathy (HCC)  Stable at this time  Continue gabapentin 100 mg po BID        This is a 70 y.o. female seen today at [facility]. Medical history includes, not limited to, type 2 DM, polymyalgia rheumatica, CKD stage 3A, hypertension, hyperlipidemia, and vitamin D deficiency.     Resident with recent hospitalization from 09/23-09/30. She presented to the ED with sepsis secondary to cellulitits steming from a diabetic foot ulcer. While hospitalized she was evaluated by podiatry and treated with IV zosyn. She was discharged to [facility] for short term rehab. Upon entering resident's room she is sitting at the side of her bed getting ready for the morning. She is alert and oriented x 3, able to answer all questions appropriately. Currently denies pain, states the biofreeze to her bilateral knees has been helping. She has 3 follow up appointments next week (wound care, breast center, and  [facility])    Spoke with nursing staff regarding resident. No concerns at this time. Upon discharge plan is for resident move to [facility]. Nursing and prior provider notes reviewed on this visit. Discussed visit with PCP and nursing staff/ supervisor. Review of Systems   Constitutional:  Negative for activity change, appetite change, fatigue, fever and unexpected weight change. HENT:  Negative for congestion. Eyes:  Negative for pain, discharge and visual disturbance. Eyeglasses   Respiratory:  Negative for cough and shortness of breath. Cardiovascular:  Negative for chest pain and palpitations. Gastrointestinal:  Negative for abdominal pain, constipation and diarrhea. Genitourinary:  Negative for difficulty urinating, dysuria, hematuria and urgency. Musculoskeletal:  Positive for arthralgias and gait problem. Skin:  Negative for color change. Neurological:  Negative for syncope and weakness. Psychiatric/Behavioral:  Negative for confusion and sleep disturbance. All other systems reviewed and are negative. ALLERGY: Reviewed, unchanged  No Known Allergies    HISTORY:  Medical Hx: Reviewed, unchanged  Family Hx: Reviewed, unchanged  Soc Hx: Reviewed,  unchanged      Physical Exam  Vitals reviewed.    Constitutional:       General: She is not in acute distress. Appearance: Normal appearance. She is not ill-appearing. HENT:      Head: Normocephalic. Right Ear: Tympanic membrane normal.      Left Ear: Tympanic membrane normal.      Nose: Nose normal. No congestion. Mouth/Throat:      Mouth: Mucous membranes are moist.      Pharynx: Oropharynx is clear. Eyes:      General:         Right eye: No discharge. Left eye: No discharge. Extraocular Movements: Extraocular movements intact. Conjunctiva/sclera: Conjunctivae normal.      Pupils: Pupils are equal, round, and reactive to light. Cardiovascular:      Rate and Rhythm: Normal rate and regular rhythm. Pulses: Normal pulses. Heart sounds: Normal heart sounds. Pulmonary:      Effort: Pulmonary effort is normal. No respiratory distress. Breath sounds: Normal breath sounds. Chest:      Chest wall: No tenderness. Abdominal:      General: Bowel sounds are normal.      Palpations: Abdomen is soft. Tenderness: There is no abdominal tenderness. Musculoskeletal:         General: No swelling. Normal range of motion. Cervical back: Normal range of motion. Right lower leg: Edema present. Left lower leg: Edema present. Skin:     General: Skin is warm and dry. Neurological:      Mental Status: She is alert and oriented to person, place, and time. Mental status is at baseline. Motor: No weakness. Psychiatric:         Mood and Affect: Mood normal.         Behavior: Behavior normal.         Thought Content: Thought content normal.         Judgment: Judgment normal.            Laboratory results / Imaging reviewed: Hard copy/ies in medical chart:    Vitals:    10/19/23 1005   BP: 120/68   Pulse: 72   Resp: 20   Temp: 97.9 °F (36.6 °C)   SpO2: 97%       Current Medications: All medications reviewed and updated in Nursing Home Chart    Please note:  This note was completed in part utilizing a voice-recognition software may have been used in the preparation of this document. Grammatical errors, random word insertion, spelling mistakes, and incomplete sentences may be an occasional consequence of the system secondary to software limitations, ambient noise and hardware issues. Occasional wrong word or "sound-alike" substitutions may have occurred due to the inherent limitations of voice recognition software. At the time of dictation, efforts were made to edit, clarify and/or correct errors. Interpretation should be guided by context. Please read the chart carefully and recognize, using context, where substitutions have occurred. If you have any questions or concerns about the context, text or information contained within the body of this dictation, please contact myself, the provider, for further clarification.       CONCETTA Courtney  10/20/2023

## 2023-10-19 NOTE — PROGRESS NOTES
Chart review completed. Update obtained from [facility] care (pcc). The patient is currently admitted to the SNF and is receiving skilled therapies. No LCD at this time. This Admin Coordinator will continue to monitor via chart review.

## 2023-10-20 NOTE — ASSESSMENT & PLAN NOTE
Lab Results   Component Value Date    HGBA1C 10.2 (H) 08/24/2023     Encourage CCD  AC HS blood glucose checks  Blood Glucose reviewed  stable  Avoid hypoglycemia  Hypoglycemia protocol  Continue lantus SQ 18 units Q12 hours  Continue humalog SQ 5 units with meals  Monitor morning blood sugars as they tend to be lower  If consistent or symptomatic may need to decrease bedtime lantus

## 2023-10-23 ENCOUNTER — OFFICE VISIT (OUTPATIENT)
Dept: WOUND CARE | Facility: CLINIC | Age: 63
End: 2023-10-23
Payer: COMMERCIAL

## 2023-10-23 VITALS
TEMPERATURE: 98.2 F | SYSTOLIC BLOOD PRESSURE: 195 MMHG | HEART RATE: 70 BPM | RESPIRATION RATE: 18 BRPM | DIASTOLIC BLOOD PRESSURE: 93 MMHG

## 2023-10-23 DIAGNOSIS — L97.512 ULCER OF RIGHT FOOT, WITH FAT LAYER EXPOSED (HCC): Primary | ICD-10-CM

## 2023-10-23 DIAGNOSIS — E11.40 TYPE 2 DIABETES MELLITUS WITH DIABETIC NEUROPATHY, WITH LONG-TERM CURRENT USE OF INSULIN (HCC): ICD-10-CM

## 2023-10-23 DIAGNOSIS — L97.522 ULCER OF LEFT FOOT, WITH FAT LAYER EXPOSED (HCC): ICD-10-CM

## 2023-10-23 DIAGNOSIS — Z79.4 TYPE 2 DIABETES MELLITUS WITH DIABETIC NEUROPATHY, WITH LONG-TERM CURRENT USE OF INSULIN (HCC): ICD-10-CM

## 2023-10-23 DIAGNOSIS — L97.412 ULCER OF RIGHT MIDFOOT WITH FAT LAYER EXPOSED (HCC): ICD-10-CM

## 2023-10-23 PROCEDURE — 11042 DBRDMT SUBQ TIS 1ST 20SQCM/<: CPT | Performed by: PODIATRIST

## 2023-10-23 RX ORDER — LIDOCAINE HYDROCHLORIDE 40 MG/ML
5 SOLUTION TOPICAL ONCE
Status: COMPLETED | OUTPATIENT
Start: 2023-10-23 | End: 2023-10-23

## 2023-10-23 RX ADMIN — LIDOCAINE HYDROCHLORIDE 5 ML: 40 SOLUTION TOPICAL at 08:42

## 2023-10-23 NOTE — PATIENT INSTRUCTIONS
Orders Placed This Encounter   Procedures    Wound cleansing and dressings     Wound cleansing and dressings       Bilateral foot wounds     May shower with protection only. Keep dressings clean,dry, and intact      Cleanse wounds with normal saline. Pat dry  Apply nickel thick santyl to all wounds. (one time application of medihoney used at wound care today)  DO NOT USE A FOAM DRESSING AS IT PULLS THE SANTYL AWAY FROM THE WOUND BED. Cover with ABD  Secure with tita wrap and tape  Change dressing daily      Above was performed at wound care center today              Patient is to be non-weight bearing at this time. Patient to have 3-4 servings of protein daily    Follow up in wound center in two weeks.      Standing Status:   Future     Standing Expiration Date:   10/23/2024

## 2023-10-23 NOTE — PROGRESS NOTES
Patient ID: Michael Terrazas is a 70 y.o. female Date of Birth 1960       Chief Complaint   Patient presents with    Follow Up Wound Care Visit     Bilateral foot ulcers       Allergies:  Patient has no known allergies. Assessments:      Diagnosis ICD-10-CM Associated Orders   1. Ulcer of right foot, with fat layer exposed ([address])  L97.512 lidocaine (XYLOCAINE) 4 % topical solution 5 mL     Wound cleansing and dressings      2. Ulcer of right midfoot with fat layer exposed ([address])  L97.412 Wound cleansing and dressings     Debridement      3. Ulcer of left foot, with fat layer exposed ([address])  L97.522 Wound cleansing and dressings     Debridement      4. Type 2 diabetes mellitus with diabetic neuropathy, with long-term current use of insulin ([facility])  E11.40 Wound cleansing and dressings    Z79.4 Debridement     Debridement               Plan:  1. Reviewed medical records. Patient was counseled and educated on the condition and the diagnosis. 2. The diagnosis, treatment options and prognosis were discussed with the patient. 3. Continue serial debridement and Santyl. 4. Stressed on patient compliance about proper off-loading, and staying off of foot to reduce the risk of infection, non-healing, and limb loss. 5. Patient will return in 2 weeks for re-evaluation. Imaging: I have personally reviewed pertinent films in PACS  Labs, pathology, and Other Studies: I have personally reviewed pertinent reports. Subjective:   HPI  The patient presents for wound care in both feet. No pain. No new complaint.       The following portions of the patient's history were reviewed and updated as appropriate:   Patient Active Problem List   Diagnosis    Uncontrolled type 2 diabetes mellitus with hyperglycemia (HCC)    Seizures ([address])    Exertional dyspnea    Enlarged pulmonary artery (HCC)    Aortic dilatation (HCC)    Anemia    Decreased pulses in feet    Hyperlipidemia    Microalbuminuria    Neuropathy of hand, right QT prolongation    Visual impairment in both eyes    Vitamin D deficiency    Lung nodules    Orthostatic hypotension    Mild intermittent asthma without complication    Type 2 diabetes mellitus with microalbuminuria, with long-term current use of insulin (HCC)    Other inflammatory and immune myopathies, not elsewhere classified    Morbid obesity ([facility])    Polyneuropathy associated with underlying disease (HCC)    PMR (polymyalgia rheumatica) ([facility])    Thrombocytosis    Left cavernous carotid aneurysm    Type 2 diabetes mellitus with hyperglycemia, with long-term current use of insulin ([facility])    Aneurysm ([facility])    Thyroid nodule    History of absence seizures    Hypersomnia    Cerebral aneurysm without rupture    Chronic migraine without aura without status migrainosus, not intractable    Hypertension    Depressed mood    Blurry vision, bilateral    Ulnar neuropathy of right upper extremity    Polymyalgia rheumatica ([facility])    Gait instability    Stage 3a chronic kidney disease ([facility])    Obstructive sleep apnea    Diabetic neuropathy ([facility])    Unsteadiness on feet    Abnormal urinalysis    Weakness    Intertrigo    Leg numbness    Unsheltered homelessness    Bilateral lower extremity edema    Diarrhea    Dyslipidemia    Insulin long-term use (HCC)    Type 2 diabetes mellitus with hyperglycemia ([facility])    Ambulatory dysfunction    Legally blind    Suspected pressure injury of deep tissue    Calculus of gallbladder without cholecystitis without obstruction    Severe sepsis (HCC)    Diabetes mellitus (HCC)    Abnormal CT scan    Friction blisters of the soles    Leukocytosis    Encounter for support and coordination of transition of care    Food insecurity    Homelessness    Ulcer of left heel ([address])    Pulmonary embolism ([address])    Diabetic foot ulcer ([address])    Acute viral conjunctivitis of both eyes    Cellulitis of lower extremity    Chronic anemia     Past Medical History:   Diagnosis Date    Anemia     Benign hypertension Procedure/Onset: 01/01/2005; Description: BP elevated today. Pt reports running out of BP meds 2wks ago. Will resume BP meds.     Diabetes mellitus ([address])     Diabetes mellitus type 2 with complications, uncontrolled 9/8/2015    Dyspnea on exertion     Hyperlipidemia     Hypertension     Legally blind 2010    Miscarriage     x 3    Polymyalgia rheumatica ([address]) 11/24/2021    Seizures (HCC)     Sickle cell trait ([facility])     Stage 3a chronic kidney disease ([facility]) 5/19/2022    Thyroid nodule 3/6/2020     Past Surgical History:   Procedure Laterality Date    CATARACT EXTRACTION Bilateral     august and september    EYE SURGERY      HYSTERECTOMY      42    OOPHORECTOMY Left     37    KS LIGATION/BIOPSY TEMPORAL ARTERY Bilateral 10/17/2019    Procedure: BIOPSY ARTERY TEMPORAL;  Surgeon: Liv Siddiqui DO;  Location: [facility];  Service: Vascular    US GUIDED THYROID BIOPSY  5/13/2020     Family History   Problem Relation Age of Onset    Heart attack Mother     Heart disease Mother     Hypertension Mother     No Known Problems Father     Heart disease Sister     No Known Problems Brother     No Known Problems Son     No Known Problems Daughter     Heart attack Maternal Grandmother     Heart disease Maternal Grandmother     Diabetes Other     Heart attack Other     No Known Problems Sister     No Known Problems Sister     No Known Problems Paternal Aunt     No Known Problems Son     Cancer Neg Hx     Stroke Neg Hx       Social History     Socioeconomic History    Marital status: /Civil Union     Spouse name: None    Number of children: None    Years of education: None    Highest education level: None   Occupational History    Occupation: long term disability   Tobacco Use    Smoking status: Never    Smokeless tobacco: Never   Vaping Use    Vaping Use: Never used   Substance and Sexual Activity    Alcohol use: Never     Alcohol/week: 0.0 standard drinks of alcohol    Drug use: No    Sexual activity: Not Currently Partners: Male     Birth control/protection: None   Other Topics Concern    None   Social History Narrative    Caffeine use    Resides with spouse and one son     Social Determinants of Health     Financial Resource Strain: Low Risk  (9/15/2020)    Overall Financial Resource Strain (CARDIA)     Difficulty of Paying Living Expenses: Not very hard   Food Insecurity: Food Insecurity Present (9/26/2023)    Hunger Vital Sign     Worried About Running Out of Food in the Last Year: Often true     Ran Out of Food in the Last Year: Often true   Transportation Needs: Unmet Transportation Needs (9/26/2023)    PRAPARE - Transportation     Lack of Transportation (Medical): Yes     Lack of Transportation (Non-Medical): Yes   Physical Activity: Inactive (2/3/2020)    Exercise Vital Sign     Days of Exercise per Week: 0 days     Minutes of Exercise per Session: 0 min   Stress: No Stress Concern Present (2/3/2020)    [address] [facility]     Feeling of Stress :  Only a little   Social Connections: Unknown (2/3/2020)    Social Connection and Isolation Panel [NHANES]     Frequency of Communication with Friends and Family: Twice a week     Frequency of Social Gatherings with Friends and Family: Twice a week     Attends Muslim Services: Not on file     Active Member of Clubs or Organizations: Not on file     Attends Club or Organization Meetings: Not on file     Marital Status:    Intimate Partner Violence: Not on file   Housing Stability: High Risk (9/26/2023)    Housing Stability Vital Sign     Unable to Pay for Housing in the Last Year: Yes     Number of [address] in the Last Year: 3     Unstable Housing in the Last Year: Yes        Current Outpatient Medications:     acetaminophen (TYLENOL) 325 mg tablet, Take 650 mg by mouth every 6 (six) hours as needed for mild pain, Disp: , Rfl:     amLODIPine (NORVASC) 5 mg tablet, Take 1 tablet (5 mg total) by mouth daily, Disp: 30 tablet, Rfl: 3    apixaban (ELIQUIS) 5 mg, Take 1 tablet (5 mg total) by mouth 2 (two) times a day Do not start before August 17, 2023., Disp: , Rfl: 0    atorvastatin (LIPITOR) 40 mg tablet, Take 1 tablet (40 mg total) by mouth daily, Disp: 90 tablet, Rfl: 3    Blood Glucose Monitoring Suppl (OneTouch Verio Reflect) w/Device KIT, Use 1 each 4 (four) times a day (Patient not taking: Reported on 9/11/2023), Disp: 1 kit, Rfl: 0    Blood Pressure Monitor KIT, Check blood pressures BID (Patient not taking: Reported on 9/11/2023), Disp: 1 kit, Rfl: 0    cholecalciferol (VITAMIN D3) 1,000 units tablet, Take 2 tablets (2,000 Units total) by mouth daily, Disp: 60 tablet, Rfl: 2    Continuous Blood Gluc  (FreeStyle [place] 2 [place]) ERIC, Use 1 each 4 (four) times a day (Patient not taking: Reported on 9/11/2023), Disp: 1 each, Rfl: 0    Continuous Blood Gluc Sensor (FreeStyle Denisa 2 Sensor) MISC, Use 1 each every 14 (fourteen) days (Patient not taking: Reported on 9/11/2023), Disp: 2 each, Rfl: 0    docusate sodium (COLACE) 100 mg capsule, Take 1 capsule (100 mg total) by mouth 2 (two) times a day, Disp: 60 capsule, Rfl: 0    Fluticasone-Salmeterol (Advair) 250-50 mcg/dose inhaler, Inhale 1 puff 2 (two) times a day Rinse mouth after use., Disp: , Rfl:     gabapentin (NEURONTIN) 100 mg capsule, Take 1 capsule (100 mg total) by mouth 2 (two) times a day, Disp: 60 capsule, Rfl: 3    hydrALAZINE (APRESOLINE) 10 mg tablet, Take 1 tablet (10 mg total) by mouth 3 (three) times a day, Disp: 90 tablet, Rfl: 3    insulin glargine (LANTUS) 100 units/mL subcutaneous injection, Inject 18 Units under the skin every 12 (twelve) hours, Disp: 10 mL, Rfl: 0    insulin lispro (HumaLOG) 100 units/mL injection, Inject 5 Units under the skin 3 (three) times a day with meals, Disp: 4.5 mL, Rfl: 0    Insulin Pen Needle (BD Pen Needle Tita 2nd Gen) 32G X 4 MM MISC, For use with insulin pen.  Pharmacy may dispense brand covered by insurance. (Patient not taking: Reported on 9/11/2023), Disp: 100 each, Rfl: 0    Insulin Pen Needle (BD Pen Needle Tita U/F) 32G X 4 MM MISC, Use four times daily as directed with insulin pen (Patient not taking: Reported on 9/11/2023), Disp: 200 each, Rfl: 3    Lancets (OneTouch Delica Plus [id number]) MISC, Use 1 each 4 (four) times a day (Patient not taking: Reported on 9/11/2023), Disp: 200 each, Rfl: 2    lidocaine (LIDODERM) 5 %, Apply 1 patch topically over 12 hours daily Remove & Discard patch within 12 hours or as directed by MD Do not start before October 1, 2023., Disp: , Rfl: 0    losartan (COZAAR) 100 MG tablet, Take 1 tablet (100 mg total) by mouth daily, Disp: 30 tablet, Rfl: 5    Menthol, Topical Analgesic, (Biofreeze) 4 % GEL, Apply 1 Application topically 2 (two) times a day, Disp: , Rfl:     metFORMIN (GLUCOPHAGE) 1000 MG tablet, Take 1 tablet (1,000 mg total) by mouth 2 (two) times a day with meals, Disp: 60 tablet, Rfl: 3    metoprolol tartrate (LOPRESSOR) 100 mg tablet, Take 1 tablet (100 mg total) by mouth every 12 (twelve) hours, Disp: 60 tablet, Rfl: 3    multivitamin-iron-minerals-folic acid (THERAPEUTIC-M) TABS tablet, Take 1 tablet by mouth daily, Disp: 30 tablet, Rfl: 2    nystatin (MYCOSTATIN) powder, Apply topically 2 (two) times a day, Disp: , Rfl: 0    predniSONE 1 mg tablet, Take 4 tablets (4 mg total) by mouth daily, Disp: 120 tablet, Rfl: 0    topiramate (TOPAMAX) 100 mg tablet, Take 1 tablet (100 mg total) by mouth daily at bedtime (Patient taking differently: Take 100 mg by mouth daily at bedtime), Disp: 30 tablet, Rfl: 0  No current facility-administered medications for this visit. Review of Systems   Constitutional:  Negative for chills and fever. Respiratory:  Negative for cough and shortness of breath. Cardiovascular:  Negative for chest pain. Gastrointestinal:  Negative for nausea and vomiting. Skin:  Positive for wound. Neurological:  Positive for numbness. Negative for weakness. Objective:  BP (!) 195/93   Pulse 70   Temp 98.2 °F (36.8 °C)   Resp 18   Pain Score: 0-No pain     Physical Exam  Vitals and nursing note reviewed. Constitutional:       General: She is not in acute distress. Appearance: She is obese. She is not toxic-appearing. Cardiovascular:      Rate and Rhythm: Normal rate and regular rhythm. Pulses: Decreased pulses. Dorsalis pedis pulses are detected w/ Doppler on the right side and detected w/ Doppler on the left side. Posterior tibial pulses are detected w/ Doppler on the right side and detected w/ Doppler on the left side. Comments: Biphasic signals bilateral DP and PTA. Pulmonary:      Effort: Pulmonary effort is normal. No respiratory distress. Musculoskeletal:         General: No tenderness or signs of injury. Right lower leg: Edema present. Left lower leg: Edema present. Skin:     General: Skin is warm. Capillary Refill: Capillary refill takes less than 2 seconds. Coloration: Skin is not cyanotic or mottled. Findings: Wound present. No abscess. Nails: There is no clubbing. Comments: Full thickness ulcers in right submet 5 head, submet 5 base, and left submet 5. Increased granular tissues. No active infection. No deep probing. See wound assessment and photo. Neurological:      General: No focal deficit present. Mental Status: She is alert and oriented to person, place, and time. Cranial Nerves: No cranial nerve deficit. Sensory: Sensory deficit present. Coordination: Coordination normal.   Psychiatric:         Mood and Affect: Mood normal.         Behavior: Behavior normal.         Thought Content:  Thought content normal.         Judgment: Judgment normal.         Wound 09/11/23 Diabetic Ulcer Foot Left;Plantar (Active)   Wound Image Images linked 10/23/23 0869   Wound Description Pink;Yellow 10/23/23 0885   Soraya-wound Assessment Dry;Scaly; White 10/23/23 0820   Wound Length (cm) 3 cm 10/23/23 0820   Wound Width (cm) 2 cm 10/23/23 0820   Wound Depth (cm) 0.1 cm 10/23/23 0820   Wound Surface Area (cm^2) 6 cm^2 10/23/23 0820   Wound Volume (cm^3) 0.6 cm^3 10/23/23 0820   Calculated Wound Volume (cm^3) 0.6 cm^3 10/23/23 0820   Drainage Amount Moderate 10/23/23 0820   Drainage Description Yellow;Serosanguineous 10/23/23 0820   Dressing Foam 10/23/23 0820   Dressing Changed Changed 10/23/23 0820   Patient Tolerance Tolerated well 10/23/23 0820   Dressing Status Removed 10/23/23 0820       Wound 09/11/23 Diabetic Ulcer Foot Right;Plantar;Distal (Active)   Wound Image Images linked 10/23/23 0844   Wound Description Hypergranulation;Pink;Yellow 10/23/23 0825   Soraya-wound Assessment White 10/23/23 0825   Wound Length (cm) 1.8 cm 10/23/23 0825   Wound Width (cm) 1.8 cm 10/23/23 0825   Wound Depth (cm) 0.2 cm 10/23/23 0825   Wound Surface Area (cm^2) 3.24 cm^2 10/23/23 0825   Wound Volume (cm^3) 0.648 cm^3 10/23/23 0825   Calculated Wound Volume (cm^3) 0.65 cm^3 10/23/23 0825   Change in Wound Size % 22.62 10/23/23 0825   Drainage Amount Moderate 10/23/23 0825   Drainage Description Serosanguineous; Yellow 10/23/23 0825   Treatments Irrigation with NSS 10/23/23 0825   Dressing Changed Changed 10/23/23 0825   Patient Tolerance Tolerated well 10/23/23 0825   Dressing Status Removed 10/23/23 0825       Wound 09/11/23 Diabetic Ulcer Foot Right;Plantar;Proximal (Active)   Wound Image Images linked 10/23/23 0822   Wound Description Yellow;Pink;Hypergranulation 10/23/23 0827   Soraya-wound Assessment Dry; White 10/23/23 0827   Wound Length (cm) 2.5 cm 10/23/23 0827   Wound Width (cm) 2.4 cm 10/23/23 0827   Wound Depth (cm) 0.5 cm 10/23/23 0827   Wound Surface Area (cm^2) 6 cm^2 10/23/23 0827   Wound Volume (cm^3) 3 cm^3 10/23/23 0827   Calculated Wound Volume (cm^3) 3 cm^3 10/23/23 0827   Drainage Amount Moderate 10/23/23 0827   Drainage Description Serosanguineous; Yellow 10/23/23 0827   Treatments Irrigation with NSS 10/23/23 0827   Dressing Foam 10/23/23 0827   Dressing Changed Changed 10/23/23 0827   Patient Tolerance Tolerated well 10/23/23 0827   Dressing Status Removed 10/23/23 0827       Debridement   Wound 09/11/23 Diabetic Ulcer Foot Left;Plantar    Universal Protocol:  Consent: Verbal consent obtained. Risks and benefits: risks, benefits and alternatives were discussed  Consent given by: patient  Time out: Immediately prior to procedure a "time out" was called to verify the correct patient, procedure, equipment, support staff and site/side marked as required. Timeout called at: 10/23/2023 9:01 AM.  Patient understanding: patient states understanding of the procedure being performed  Patient identity confirmed: verbally with patient    Performed by: physician  Debridement type: surgical  Level of debridement: subcutaneous tissue  Pain control: lidocaine 4%  Post-debridement measurements  Length (cm): 3  Width (cm): 2.1  Depth (cm): 0.5  Percent debrided: 100%  Surface Area (cm^2): 6.3  Area debrided (cm^2): 6.3  Volume (cm^3): 3.2  Tissue and other material debrided: dermis, epidermis and subcutaneous tissue  Devitalized tissue debrided: fibrin and slough  Instrument(s) utilized: blade  Bleeding: small  Hemostasis obtained with: pressure  Procedural pain (0-10): 0  Post-procedural pain: 0   Response to treatment: procedure was tolerated well    Debridement   Wound 09/11/23 Diabetic Ulcer Foot Right;Plantar;Proximal    Universal Protocol:  Consent: Verbal consent obtained. Risks and benefits: risks, benefits and alternatives were discussed  Consent given by: patient  Time out: Immediately prior to procedure a "time out" was called to verify the correct patient, procedure, equipment, support staff and site/side marked as required.   Timeout called at: 10/23/2023 9:02 AM.  Patient understanding: patient states understanding of the procedure being performed  Patient identity confirmed: verbally with patient    Performed by: physician  Debridement type: surgical  Level of debridement: subcutaneous tissue    Post-debridement measurements  Length (cm): 2.5  Width (cm): 2.5  Depth (cm): 0.3  Percent debrided: 100%  Surface Area (cm^2): 6.3  Area debrided (cm^2): 6.3  Volume (cm^3): 1.9  Tissue and other material debrided: dermis, epidermis and subcutaneous tissue  Devitalized tissue debrided: fibrin and slough  Instrument(s) utilized: blade  Bleeding: small  Hemostasis obtained with: pressure  Procedural pain (0-10): 0  Post-procedural pain: 0   Response to treatment: procedure was tolerated well         Results from last 6 Months   Lab Units 08/09/23  0945   WOUND CULTURE  4+ Growth of       Lab Results   Component Value Date    HGBA1C 10.2 (H) 08/24/2023       Wound Instructions:  Orders Placed This Encounter   Procedures    Wound cleansing and dressings     Wound cleansing and dressings       Bilateral foot wounds     May shower with protection only. Keep dressings clean,dry, and intact      Cleanse wounds with normal saline. Pat dry  Apply nickel thick santyl to all wounds. (one time application of medihoney used at wound care today)  DO NOT USE A FOAM DRESSING AS IT PULLS THE SANTYL AWAY FROM THE WOUND BED. Cover with ABD  Secure with tita wrap and tape  Change dressing daily      Above was performed at wound care center today              Patient is to be non-weight bearing at this time. Patient to have 3-4 servings of protein daily    Follow up in wound center in two weeks.      Standing Status:   Future     Standing Expiration Date:   10/23/2024    Debridement     This order was created via procedure documentation    Debridement     This order was created via procedure documentation             Siddhartha Gillette DPM

## 2023-10-24 ENCOUNTER — HOSPITAL ENCOUNTER (OUTPATIENT)
Dept: ULTRASOUND IMAGING | Facility: CLINIC | Age: 63
Discharge: HOME | End: 2023-10-24
Payer: COMMERCIAL

## 2023-10-24 ENCOUNTER — HOSPITAL ENCOUNTER (OUTPATIENT)
Dept: MAMMOGRAPHY | Facility: CLINIC | Age: 63
Discharge: HOME | End: 2023-10-24
Payer: COMMERCIAL

## 2023-10-24 VITALS — BODY MASS INDEX: 38.49 KG/M2 | HEIGHT: 68 IN | WEIGHT: 254 LBS

## 2023-10-24 DIAGNOSIS — R92.8 ABNORMAL MAMMOGRAM: ICD-10-CM

## 2023-10-24 PROCEDURE — 77066 DX MAMMO INCL CAD BI: CPT

## 2023-10-24 PROCEDURE — 76642 ULTRASOUND BREAST LIMITED: CPT

## 2023-10-24 PROCEDURE — G0279 TOMOSYNTHESIS, MAMMO: HCPCS

## 2023-10-26 ENCOUNTER — PATIENT OUTREACH (OUTPATIENT)
Dept: FAMILY MEDICINE CLINIC | Facility: CLINIC | Age: 63
End: 2023-10-26

## 2023-10-26 NOTE — PROGRESS NOTES
Chart review completed. Update obtained from [facility] care ([facility]). The patient is currently admitted to the SNF and is receiving skilled therapies. No LCD at this time. This Admin Coordinator will continue to monitor via chart review.

## 2023-10-27 LAB
LEFT EYE DIABETIC RETINOPATHY: POSITIVE
RIGHT EYE DIABETIC RETINOPATHY: POSITIVE

## 2023-10-30 ENCOUNTER — NURSING HOME VISIT (OUTPATIENT)
Dept: GERIATRICS | Facility: OTHER | Age: 63
End: 2023-10-30
Payer: COMMERCIAL

## 2023-10-30 VITALS
TEMPERATURE: 97.9 F | DIASTOLIC BLOOD PRESSURE: 80 MMHG | WEIGHT: 255.5 LBS | HEART RATE: 72 BPM | OXYGEN SATURATION: 97 % | RESPIRATION RATE: 20 BRPM | SYSTOLIC BLOOD PRESSURE: 144 MMHG | BODY MASS INDEX: 38.85 KG/M2

## 2023-10-30 DIAGNOSIS — E11.42 DIABETIC POLYNEUROPATHY ASSOCIATED WITH TYPE 2 DIABETES MELLITUS (HCC): ICD-10-CM

## 2023-10-30 DIAGNOSIS — E13.621: Primary | ICD-10-CM

## 2023-10-30 DIAGNOSIS — Z79.4 TYPE 2 DIABETES MELLITUS WITH HYPERGLYCEMIA, WITH LONG-TERM CURRENT USE OF INSULIN (HCC): ICD-10-CM

## 2023-10-30 DIAGNOSIS — E78.5 DYSLIPIDEMIA: ICD-10-CM

## 2023-10-30 DIAGNOSIS — L97.508: Primary | ICD-10-CM

## 2023-10-30 DIAGNOSIS — R26.2 AMBULATORY DYSFUNCTION: ICD-10-CM

## 2023-10-30 DIAGNOSIS — I26.99 ACUTE PULMONARY EMBOLISM, UNSPECIFIED PULMONARY EMBOLISM TYPE, UNSPECIFIED WHETHER ACUTE COR PULMONALE PRESENT (HCC): ICD-10-CM

## 2023-10-30 DIAGNOSIS — I10 PRIMARY HYPERTENSION: ICD-10-CM

## 2023-10-30 DIAGNOSIS — E11.65 TYPE 2 DIABETES MELLITUS WITH HYPERGLYCEMIA, WITH LONG-TERM CURRENT USE OF INSULIN (HCC): ICD-10-CM

## 2023-10-30 PROCEDURE — 99309 SBSQ NF CARE MODERATE MDM 30: CPT

## 2023-10-30 RX ORDER — INSULIN LISPRO 100 [IU]/ML
5 INJECTION, SOLUTION INTRAVENOUS; SUBCUTANEOUS
COMMUNITY

## 2023-10-31 NOTE — ASSESSMENT & PLAN NOTE
Lab Results   Component Value Date    HGBA1C 10.2 (H) 08/24/2023     Encourage CCD  AC HS blood glucose checks  Blood Glucose reviewed  stable  Avoid hypoglycemia  Hypoglycemia protocol  Continue lantus SQ 18 units Q12 hours  Continue humalog SQ 5 units with meals  Monitor morning blood sugars as they tend to be lower

## 2023-10-31 NOTE — ASSESSMENT & PLAN NOTE
Recently evaluated by wound care with debridement  NWB to allow healing of wounds  Continue wound care to bilateral lower extremities

## 2023-10-31 NOTE — ASSESSMENT & PLAN NOTE
Currently NWB to allow wounds to heal  Continue PT/OT  Encourage use of assistive device  Continue to use wheelchair  Goal to use walker  Continue fall precautions  Pain control to bilateral knees  Biofreeze 4% external gel scheduled TID  Encourage patient to participate with activities  Provide education for patient, family, and caregivers

## 2023-10-31 NOTE — PROGRESS NOTES
[address], [address], [address]  [phone]    NAME: Pooja Allen  AGE: 70 y.o. SEX: female    Progress Note    Location: [facility]  POS: [facility]  Code Status: Full Code    Chief complaint / Reason for visit:  Follow-up visit    Assessment/Plan:    Dyslipidemia  lipid panel (04/19/23)  cholesterol-152  triglyceride-140  LDL-94  HDL-30  Continue atorvastatin 40 mg po daily  Recommend repeat lipid panel's yearly    Ambulatory dysfunction  Currently NWB to allow wounds to heal  Continue PT/OT  Encourage use of assistive device  Continue to use wheelchair  Goal to use walker  Continue fall precautions  Pain control to bilateral knees  Biofreeze 4% external gel scheduled TID  Encourage patient to participate with activities  Provide education for patient, family, and caregivers    Pulmonary embolism ([address])  History of PE and LE DVT  Continue eliquis 5 mg po BID    Hypertension  Encourage medication adherence   Continue daily blood pressure checks  Encourage heart healthy diet  Continue hydralazine 10 mg po TID  Continue losartan 100 mg po daily  Continue amlodipine 5 mg po daily  Continue metoprolol tartrate 100 mg Q12 hours  Blood pressures stable    Type 2 diabetes mellitus with hyperglycemia, with long-term current use of insulin (HCC)    Lab Results   Component Value Date    HGBA1C 10.2 (H) 08/24/2023     Encourage CCD  AC HS blood glucose checks  Blood Glucose reviewed  stable  Avoid hypoglycemia  Hypoglycemia protocol  Continue lantus SQ 18 units Q12 hours  Continue humalog SQ 5 units with meals  Monitor morning blood sugars as they tend to be lower    Diabetic neuropathy (HCC)  Stable at this time  Continue gabapentin 100 mg po BID    Diabetic foot ulcer ([address])  Recently evaluated by wound care with debridement  NWB to allow healing of wounds  Continue wound care to bilateral lower extremities      This is a 70 y.o. female seen today at [facility]. Medical history includes, not limited to, type 2 DM, polymyalgia rheumatica, CKD stage 3A, hypertension, hyperlipidemia, and vitamin D deficiency. Resident with recent hospitalization from 09/23-09/30. She presented to the ED with sepsis secondary to cellulitits steming from a diabetic foot ulcer. While hospitalized she was evaluated by podiatry and treated with IV zosyn. She was discharged to [facility] for short term rehab. Resident is seen today for a follow up visit. Upon entering resident's room she is sitting at the side of her bed getting ready for the morning. She is alert and oriented x 3, able to answer all questions appropriately. Currently denies pain. States her appointments that she had last week went well. She understands she needs to stay off her feet to allow the wounds to heal.  Wounds visualized on assessment today. Dressings removed on bilateral feet. Spoke with nursing staff regarding resident. No concerns at this time. Nursing and prior provider notes reviewed on this visit. Discussed visit with PCP and nursing staff/ supervisor. Review of Systems   Constitutional:  Positive for activity change. Negative for appetite change, fatigue, fever and unexpected weight change. HENT:  Negative for congestion. Eyes:  Negative for pain, discharge and visual disturbance. Eyeglasses   Respiratory:  Negative for cough and shortness of breath. Cardiovascular:  Negative for chest pain and palpitations. Gastrointestinal:  Negative for abdominal pain, constipation and diarrhea. Genitourinary:  Negative for difficulty urinating, dysuria, hematuria and urgency. Musculoskeletal:  Positive for arthralgias and gait problem. Skin:  Positive for wound (bilateral feet, diabetic ulcers). Negative for color change. Neurological:  Negative for syncope and weakness. Psychiatric/Behavioral:  Negative for confusion and sleep disturbance.     All other systems reviewed and are negative. ALLERGY: Reviewed, unchanged  No Known Allergies    HISTORY:  Medical Hx: Reviewed, unchanged  Family Hx: Reviewed, unchanged  Soc Hx: Reviewed,  unchanged      Physical Exam  Vitals and nursing note reviewed. Constitutional:       General: She is not in acute distress. Appearance: Normal appearance. She is not ill-appearing. HENT:      Head: Normocephalic. Right Ear: Tympanic membrane normal.      Left Ear: Tympanic membrane normal.      Nose: Nose normal. No congestion. Mouth/Throat:      Mouth: Mucous membranes are moist.      Pharynx: Oropharynx is clear. Eyes:      General:         Right eye: No discharge. Left eye: No discharge. Extraocular Movements: Extraocular movements intact. Conjunctiva/sclera: Conjunctivae normal.      Pupils: Pupils are equal, round, and reactive to light. Cardiovascular:      Rate and Rhythm: Normal rate and regular rhythm. Heart sounds: Normal heart sounds. Comments: Decreased pedal pulses  Pulmonary:      Effort: Pulmonary effort is normal. No respiratory distress. Breath sounds: Normal breath sounds. Chest:      Chest wall: No tenderness. Abdominal:      General: Bowel sounds are normal.      Palpations: Abdomen is soft. Tenderness: There is no abdominal tenderness. Musculoskeletal:         General: No swelling. Normal range of motion. Cervical back: Normal range of motion. Right lower leg: No edema. Left lower leg: No edema. Skin:     General: Skin is warm and dry. Neurological:      Mental Status: She is alert and oriented to person, place, and time. Mental status is at baseline. Motor: No weakness. Psychiatric:         Mood and Affect: Mood normal.         Behavior: Behavior normal.         Thought Content:  Thought content normal.         Judgment: Judgment normal.            Laboratory results / Imaging reviewed: Hard copy/ies in medical chart:    Vitals:    10/30/23 2335   BP: 144/80   Pulse: 72   Resp: 20   Temp: 97.9 °F (36.6 °C)   SpO2: 97%       Current Medications: All medications reviewed and updated in Nursing Home Chart    Please note: This note was completed in part utilizing a voice-recognition software may have been used in the preparation of this document. Grammatical errors, random word insertion, spelling mistakes, and incomplete sentences may be an occasional consequence of the system secondary to software limitations, ambient noise and hardware issues. Occasional wrong word or "sound-alike" substitutions may have occurred due to the inherent limitations of voice recognition software. At the time of dictation, efforts were made to edit, clarify and/or correct errors. Interpretation should be guided by context. Please read the chart carefully and recognize, using context, where substitutions have occurred. If you have any questions or concerns about the context, text or information contained within the body of this dictation, please contact myself, the provider, for further clarification.       CONCETTA Casiano  10/30/2023

## 2023-11-01 ENCOUNTER — PATIENT OUTREACH (OUTPATIENT)
Dept: CASE MANAGEMENT | Facility: OTHER | Age: 63
End: 2023-11-01

## 2023-11-01 NOTE — PROGRESS NOTES
Chart review complete It has been 30 days since SNF/[facility] Surveillance episode was opened I will no longer be following the patient per protocol. Email sent to [facility] to inform them I will no longer be following the patient.

## 2023-11-06 ENCOUNTER — OFFICE VISIT (OUTPATIENT)
Dept: WOUND CARE | Facility: CLINIC | Age: 63
End: 2023-11-06
Payer: COMMERCIAL

## 2023-11-06 VITALS
RESPIRATION RATE: 18 BRPM | DIASTOLIC BLOOD PRESSURE: 78 MMHG | SYSTOLIC BLOOD PRESSURE: 144 MMHG | HEART RATE: 70 BPM | TEMPERATURE: 97.6 F

## 2023-11-06 DIAGNOSIS — E11.40 TYPE 2 DIABETES MELLITUS WITH DIABETIC NEUROPATHY, WITH LONG-TERM CURRENT USE OF INSULIN (HCC): ICD-10-CM

## 2023-11-06 DIAGNOSIS — Z79.4 TYPE 2 DIABETES MELLITUS WITH DIABETIC NEUROPATHY, WITH LONG-TERM CURRENT USE OF INSULIN (HCC): ICD-10-CM

## 2023-11-06 DIAGNOSIS — L97.522 ULCER OF LEFT FOOT, WITH FAT LAYER EXPOSED (HCC): ICD-10-CM

## 2023-11-06 DIAGNOSIS — L97.412 ULCER OF RIGHT MIDFOOT WITH FAT LAYER EXPOSED (HCC): ICD-10-CM

## 2023-11-06 DIAGNOSIS — L97.512 ULCER OF RIGHT FOOT, WITH FAT LAYER EXPOSED (HCC): Primary | ICD-10-CM

## 2023-11-06 PROCEDURE — 11042 DBRDMT SUBQ TIS 1ST 20SQCM/<: CPT | Performed by: PODIATRIST

## 2023-11-06 RX ORDER — LIDOCAINE 40 MG/G
CREAM TOPICAL ONCE
Status: COMPLETED | OUTPATIENT
Start: 2023-11-06 | End: 2023-11-06

## 2023-11-06 RX ADMIN — LIDOCAINE: 40 CREAM TOPICAL at 10:09

## 2023-11-06 NOTE — PATIENT INSTRUCTIONS
Orders Placed This Encounter   Procedures    Wound cleansing and dressings     Bilateral foot wounds     May shower with protection only. Keep dressings clean,dry, and intact      Cleanse wounds with normal saline. Pat dry  Apply nickel thick santyl to all wounds. (one time application of medihoney used at wound care today)  DO NOT USE A FOAM DRESSING AS IT PULLS THE SANTYL AWAY FROM THE WOUND BED. Cover with ABD  Secure with tita wrap and tape  Change dressing daily      Treatment Today:  Dr. Cinthia Parker debrided your wounds, we cleansed with normal saline. Dakin's wet to dry dressing applied today. Patient is to be non-weight bearing at this time. Patient to have 3-4 servings of protein daily     Follow up in wound center in two weeks.      Standing Status:   Future     Standing Expiration Date:   11/6/2024    Debridement     This order was created via procedure documentation    Debridement     This order was created via procedure documentation

## 2023-11-06 NOTE — PROGRESS NOTES
Patient ID: Enmanuel Gomes is a 70 y.o. female Date of Birth 1960       Chief Complaint   Patient presents with   • Follow Up Wound Care Visit     Follow up visit for wounds to bl feet. Pt denies any issues or concerns since last visit. Allergies:  Patient has no known allergies. Assessments:      Diagnosis ICD-10-CM Associated Orders   1. Ulcer of right foot, with fat layer exposed ([address])  L97.512 lidocaine (LMX) 4 % cream     Wound cleansing and dressings      2. Ulcer of right midfoot with fat layer exposed ([address])  L97.412 lidocaine (LMX) 4 % cream     Wound cleansing and dressings     Debridement      3. Ulcer of left foot, with fat layer exposed ([address])  L97.522 lidocaine (LMX) 4 % cream     Wound cleansing and dressings     Debridement      4. Type 2 diabetes mellitus with diabetic neuropathy, with long-term current use of insulin (HCC)  E11.40 lidocaine (LMX) 4 % cream    Z79.4 Wound cleansing and dressings     Debridement     Debridement               Plan:  1. Reviewed medical records. Patient was counseled and educated on the condition and the diagnosis. 2. The diagnosis, treatment options and prognosis were discussed with the patient. 3. Wounds are stable without infection. Continue serial debridement and Santyl. 4. Stressed on patient compliance about proper off-loading, and staying off of foot to reduce the risk of infection, non-healing, and limb loss. 5. Patient will return in 2 weeks for re-evaluation. Imaging: I have personally reviewed pertinent films in PACS  Labs, pathology, and Other Studies: I have personally reviewed pertinent reports. Subjective:   HPI  The patient presents for wound care in both feet. No pain. No new complaint. She presents with wheelchair.       The following portions of the patient's history were reviewed and updated as appropriate:   Patient Active Problem List   Diagnosis   • Uncontrolled type 2 diabetes mellitus with hyperglycemia ([address])   • Seizures ([address])   • Exertional dyspnea   • Enlarged pulmonary artery (HCC)   • Aortic dilatation (formerly [facility])   • Anemia   • Decreased pulses in feet   • Hyperlipidemia   • Microalbuminuria   • Neuropathy of hand, right   • QT prolongation   • Visual impairment in both eyes   • Vitamin D deficiency   • Lung nodules   • Orthostatic hypotension   • Mild intermittent asthma without complication   • Type 2 diabetes mellitus with microalbuminuria, with long-term current use of insulin (formerly [facility])   • Other inflammatory and immune myopathies, not elsewhere classified   • Morbid obesity (formerly [facility])   • Polyneuropathy associated with underlying disease (formerly [facility])   • PMR (polymyalgia rheumatica) (formerly [facility])   • Thrombocytosis   • Left cavernous carotid aneurysm   • Type 2 diabetes mellitus with hyperglycemia, with long-term current use of insulin (formerly [facility])   • Aneurysm (formerly [facility])   • Thyroid nodule   • History of absence seizures   • Hypersomnia   • Cerebral aneurysm without rupture   • Chronic migraine without aura without status migrainosus, not intractable   • Hypertension   • Depressed mood   • Blurry vision, bilateral   • Ulnar neuropathy of right upper extremity   • Polymyalgia rheumatica (formerly [facility])   • Gait instability   • Stage 3a chronic kidney disease (formerly [facility])   • Obstructive sleep apnea   • Diabetic neuropathy (formerly [facility])   • Unsteadiness on feet   • Abnormal urinalysis   • Weakness   • Intertrigo   • Leg numbness   • Unsheltered homelessness   • Bilateral lower extremity edema   • Diarrhea   • Dyslipidemia   • Insulin long-term use (formerly [facility])   • Type 2 diabetes mellitus with hyperglycemia (formerly [facility])   • Ambulatory dysfunction   • Legally blind   • Suspected pressure injury of deep tissue   • Calculus of gallbladder without cholecystitis without obstruction   • Severe sepsis (formerly [facility])   • Diabetes mellitus (formerly [facility])   • Abnormal CT scan   • Friction blisters of the soles   • Leukocytosis   • Encounter for support and coordination of transition of care   • Food insecurity • Homelessness   • Ulcer of left heel (HCC)   • Pulmonary embolism (HCC)   • Diabetic foot ulcer (HCC)   • Acute viral conjunctivitis of both eyes   • Cellulitis of lower extremity   • Chronic anemia     Past Medical History:   Diagnosis Date   • Anemia    • Benign hypertension     Procedure/Onset: 01/01/2005; Description: BP elevated today. Pt reports running out of BP meds 2wks ago. Will resume BP meds.    • Diabetes mellitus ([address])    • Diabetes mellitus type 2 with complications, uncontrolled 9/8/2015   • Dyspnea on exertion    • Hyperlipidemia    • Hypertension    • Legally blind 2010   • Miscarriage     x 3   • Polymyalgia rheumatica ([address]) 11/24/2021   • Seizures ([address])    • Sickle cell trait ([address])    • Stage 3a chronic kidney disease ([address]) 5/19/2022   • Thyroid nodule 3/6/2020     Past Surgical History:   Procedure Laterality Date   • CATARACT EXTRACTION Bilateral     august and september   • EYE SURGERY     • HYSTERECTOMY      37   • OOPHORECTOMY Left     42   • DE LIGATION/BIOPSY TEMPORAL ARTERY Bilateral 10/17/2019    Procedure: BIOPSY ARTERY TEMPORAL;  Surgeon: Dimas Owens DO;  Location: [facility];  Service: Vascular   • US GUIDED THYROID BIOPSY  5/13/2020     Family History   Problem Relation Age of Onset   • Heart attack Mother    • Heart disease Mother    • Hypertension Mother    • No Known Problems Father    • Heart disease Sister    • No Known Problems Sister    • No Known Problems Sister    • No Known Problems Daughter    • Heart attack Maternal Grandmother    • Heart disease Maternal Grandmother    • No Known Problems Brother    • No Known Problems Son    • No Known Problems Son    • No Known Problems Paternal Aunt    • Diabetes Other    • Heart attack Other    • Cancer Neg Hx    • Stroke Neg Hx       Social History     Socioeconomic History   • Marital status: /Civil Union     Spouse name: None   • Number of children: None   • Years of education: None   • Highest education level: None   Occupational History   • Occupation: long term disability   Tobacco Use   • Smoking status: Never   • Smokeless tobacco: Never   Vaping Use   • Vaping Use: Never used   Substance and Sexual Activity   • Alcohol use: Never     Alcohol/week: 0.0 standard drinks of alcohol   • Drug use: No   • Sexual activity: Not Currently     Partners: Male     Birth control/protection: None   Other Topics Concern   • None   Social History Narrative    Caffeine use    Resides with spouse and one son     Social Determinants of Health     Financial Resource Strain: Low Risk  (9/15/2020)    Overall Financial Resource Strain (CARDIA)    • Difficulty of Paying Living Expenses: Not very hard   Food Insecurity: Food Insecurity Present (9/26/2023)    Hunger Vital Sign    • Worried About Running Out of Food in the Last Year: Often true    • Ran Out of Food in the Last Year: Often true   Transportation Needs: Unmet Transportation Needs (9/26/2023)    PRAPARE - Transportation    • Lack of Transportation (Medical): Yes    • Lack of Transportation (Non-Medical): Yes   Physical Activity: Inactive (2/3/2020)    Exercise Vital Sign    • Days of Exercise per Week: 0 days    • Minutes of Exercise per Session: 0 min   Stress: No Stress Concern Present (2/3/2020)    [address] [place] [address]    • Feeling of Stress :  Only a little   Social Connections: Unknown (2/3/2020)    Social Connection and Isolation Panel [NHANES]    • Frequency of Communication with Friends and Family: Twice a week    • Frequency of Social Gatherings with Friends and Family: Twice a week    • Attends [employer] Services: Not on file    • Active Member of Clubs or Organizations: Not on file    • Attends Club or Organization Meetings: Not on file    • Marital Status:    Intimate Partner Violence: Not on file   Housing Stability: High Risk (9/26/2023)    Housing Stability Vital Sign    • Unable to Pay for Housing in the Last Year: Yes    • Number of Places Lived in the Last Year: 3    • Unstable Housing in the Last Year: Yes        Current Outpatient Medications:   •  acetaminophen (TYLENOL) 325 mg tablet, Take 650 mg by mouth every 6 (six) hours as needed for mild pain, Disp: , Rfl:   •  amLODIPine (NORVASC) 5 mg tablet, Take 1 tablet (5 mg total) by mouth daily, Disp: 30 tablet, Rfl: 3  •  apixaban (ELIQUIS) 5 mg, Take 1 tablet (5 mg total) by mouth 2 (two) times a day Do not start before August 17, 2023., Disp: , Rfl: 0  •  atorvastatin (LIPITOR) 40 mg tablet, Take 1 tablet (40 mg total) by mouth daily, Disp: 90 tablet, Rfl: 3  •  Blood Glucose Monitoring Suppl (OneTouch Verio Reflect) w/Device KIT, Use 1 each 4 (four) times a day (Patient not taking: Reported on 9/11/2023), Disp: 1 kit, Rfl: 0  •  Blood Pressure Monitor KIT, Check blood pressures BID (Patient not taking: Reported on 9/11/2023), Disp: 1 kit, Rfl: 0  •  cholecalciferol (VITAMIN D3) 1,000 units tablet, Take 2 tablets (2,000 Units total) by mouth daily, Disp: 60 tablet, Rfl: 2  •  collagenase (SANTYL) ointment, Apply 1 Application topically daily Apply to right distal plantar foot diabetic ulcer, apply to right proximal plantar foot diabetic ulcer, apply to left plantar foot diabetic ulcer, Disp: , Rfl:   •  Continuous Blood Gluc  (FreeStyle Denisa 2 Fillmore) ERIC, Use 1 each 4 (four) times a day (Patient not taking: Reported on 9/11/2023), Disp: 1 each, Rfl: 0  •  Continuous Blood Gluc Sensor (FreeStyle Denisa 2 Sensor) MISC, Use 1 each every 14 (fourteen) days (Patient not taking: Reported on 9/11/2023), Disp: 2 each, Rfl: 0  •  docusate sodium (COLACE) 100 mg capsule, Take 1 capsule (100 mg total) by mouth 2 (two) times a day, Disp: 60 capsule, Rfl: 0  •  Fluticasone-Salmeterol (Advair) 250-50 mcg/dose inhaler, Inhale 1 puff 2 (two) times a day Rinse mouth after use., Disp: , Rfl:   •  gabapentin (NEURONTIN) 100 mg capsule, Take 1 capsule (100 mg total) by mouth 2 (two) times a day, Disp: 60 capsule, Rfl: 3  •  hydrALAZINE (APRESOLINE) 10 mg tablet, Take 1 tablet (10 mg total) by mouth 3 (three) times a day, Disp: 90 tablet, Rfl: 3  •  insulin glargine (LANTUS) 100 units/mL subcutaneous injection, Inject 18 Units under the skin every 12 (twelve) hours, Disp: 10 mL, Rfl: 0  •  insulin lispro (HumaLOG KwikPen) 100 units/mL injection pen, Inject 5 Units under the skin 3 (three) times a day with meals, Disp: , Rfl:   •  Insulin Pen Needle (BD Pen Needle Tita 2nd Gen) 32G X 4 MM MISC, For use with insulin pen. Pharmacy may dispense brand covered by insurance.  (Patient not taking: Reported on 9/11/2023), Disp: 100 each, Rfl: 0  •  Insulin Pen Needle (BD Pen Needle Tita U/F) 32G X 4 MM MISC, Use four times daily as directed with insulin pen (Patient not taking: Reported on 9/11/2023), Disp: 200 each, Rfl: 3  •  Lancets (OneTouch Delica Plus [id number]) MISC, Use 1 each 4 (four) times a day (Patient not taking: Reported on 9/11/2023), Disp: 200 each, Rfl: 2  •  lidocaine (LIDODERM) 5 %, Apply 1 patch topically over 12 hours daily Remove & Discard patch within 12 hours or as directed by MD Do not start before October 1, 2023., Disp: , Rfl: 0  •  losartan (COZAAR) 100 MG tablet, Take 1 tablet (100 mg total) by mouth daily, Disp: 30 tablet, Rfl: 5  •  Menthol, Topical Analgesic, (Biofreeze) 4 % GEL, Apply 1 Application topically 2 (two) times a day, Disp: , Rfl:   •  metFORMIN (GLUCOPHAGE) 1000 MG tablet, Take 1 tablet (1,000 mg total) by mouth 2 (two) times a day with meals, Disp: 60 tablet, Rfl: 3  •  metoprolol tartrate (LOPRESSOR) 100 mg tablet, Take 1 tablet (100 mg total) by mouth every 12 (twelve) hours, Disp: 60 tablet, Rfl: 3  •  multivitamin-iron-minerals-folic acid (THERAPEUTIC-M) TABS tablet, Take 1 tablet by mouth daily, Disp: 30 tablet, Rfl: 2  •  nystatin (MYCOSTATIN) powder, Apply topically 2 (two) times a day, Disp: , Rfl: 0  •  predniSONE 1 mg tablet, Take 4 tablets (4 mg total) by mouth daily, Disp: 120 tablet, Rfl: 0  •  topiramate (TOPAMAX) 100 mg tablet, Take 1 tablet (100 mg total) by mouth daily at bedtime (Patient taking differently: Take 100 mg by mouth daily at bedtime), Disp: 30 tablet, Rfl: 0  No current facility-administered medications for this visit. Review of Systems   Constitutional:  Negative for chills and fever. Respiratory:  Negative for cough and shortness of breath. Cardiovascular:  Negative for chest pain. Gastrointestinal:  Negative for nausea and vomiting. Skin:  Positive for wound. Neurological:  Positive for numbness. Negative for weakness. Objective:  /78   Pulse 70   Temp 97.6 °F (36.4 °C) (Tympanic)   Resp 18   Pain Score: 0-No pain     Physical Exam  Vitals and nursing note reviewed. Constitutional:       General: She is not in acute distress. Appearance: She is obese. She is not toxic-appearing. Cardiovascular:      Rate and Rhythm: Normal rate and regular rhythm. Pulses: Decreased pulses. Dorsalis pedis pulses are detected w/ Doppler on the right side and detected w/ Doppler on the left side. Posterior tibial pulses are detected w/ Doppler on the right side and detected w/ Doppler on the left side. Comments: Biphasic signals bilateral DP and PTA. Pulmonary:      Effort: Pulmonary effort is normal. No respiratory distress. Musculoskeletal:         General: No tenderness or signs of injury. Right lower leg: Edema present. Left lower leg: Edema present. Skin:     General: Skin is warm. Capillary Refill: Capillary refill takes less than 2 seconds. Coloration: Skin is not cyanotic or mottled. Findings: Wound present. No abscess. Nails: There is no clubbing. Comments: Full thickness ulcers in right submet 5 head, submet 5 base, and left submet 5. Increased granular tissues. Wounds do not probe to bone. No active infection.   See wound assessment and photo. Neurological:      General: No focal deficit present. Mental Status: She is alert and oriented to person, place, and time. Cranial Nerves: No cranial nerve deficit. Sensory: Sensory deficit present. Coordination: Coordination normal.   Psychiatric:         Mood and Affect: Mood normal.         Behavior: Behavior normal.         Thought Content: Thought content normal.         Judgment: Judgment normal.       Wound 09/11/23 Diabetic Ulcer Foot Left;Plantar (Active)   Wound Image Images linked 11/06/23 1001   Wound Description Yellow;Pink;Granulation tissue 11/06/23 1003   Soraya-wound Assessment Pink;Scar Tissue 11/06/23 1003   Wound Length (cm) 2.8 cm 11/06/23 1003   Wound Width (cm) 1.8 cm 11/06/23 1003   Wound Depth (cm) 0.5 cm 11/06/23 1003   Wound Surface Area (cm^2) 5.04 cm^2 11/06/23 1003   Wound Volume (cm^3) 2.52 cm^3 11/06/23 1003   Calculated Wound Volume (cm^3) 2.52 cm^3 11/06/23 1003   Drainage Amount Moderate 11/06/23 1003   Drainage Description Serosanguineous; Ruiz 11/06/23 1003   Treatments Irrigation with NSS 11/06/23 1003   Patient Tolerance Tolerated well 11/06/23 1003   Dressing Status Removed 11/06/23 1003       Wound 09/11/23 Diabetic Ulcer Foot Right;Plantar;Distal (Active)   Wound Image Images linked 11/06/23 1002   Wound Description Granulation tissue; Yellow;Slough 11/06/23 1003   Soraya-wound Assessment Scar Tissue 11/06/23 1003   Wound Length (cm) 2 cm 11/06/23 1003   Wound Width (cm) 2.2 cm 11/06/23 1003   Wound Depth (cm) 0.7 cm 11/06/23 1003   Wound Surface Area (cm^2) 4.4 cm^2 11/06/23 1003   Wound Volume (cm^3) 3.08 cm^3 11/06/23 1003   Calculated Wound Volume (cm^3) 3.08 cm^3 11/06/23 1003   Change in Wound Size % -266.67 11/06/23 1003   Drainage Amount Moderate 11/06/23 1003   Drainage Description Serosanguineous; Ruiz 11/06/23 1003   Non-staged Wound Description Full thickness 11/06/23 1003   Treatments Irrigation with NSS 11/06/23 1003   Patient Tolerance Tolerated well 11/06/23 1003   Dressing Status Removed 11/06/23 1003       Wound 09/11/23 Diabetic Ulcer Foot Right;Plantar;Proximal (Active)   Wound Image Images linked 11/06/23 1002   Wound Description Granulation tissue; Yellow;Pink;Slough 11/06/23 1003   Soraya-wound Assessment Scar Tissue 11/06/23 1003   Wound Length (cm) 1.6 cm 11/06/23 1003   Wound Width (cm) 1.7 cm 11/06/23 1003   Wound Depth (cm) 0.1 cm 11/06/23 1003   Wound Surface Area (cm^2) 2.72 cm^2 11/06/23 1003   Wound Volume (cm^3) 0.272 cm^3 11/06/23 1003   Calculated Wound Volume (cm^3) 0.27 cm^3 11/06/23 1003   Drainage Amount Moderate 11/06/23 1003   Drainage Description Serosanguineous; Ruiz 11/06/23 1003   Treatments Irrigation with NSS 11/06/23 1003   Patient Tolerance Tolerated well 11/06/23 1003   Dressing Status Removed 11/06/23 1003       Debridement   Wound 09/11/23 Diabetic Ulcer Foot Right;Plantar;Proximal    Universal Protocol:  Consent: Verbal consent obtained. Risks and benefits: risks, benefits and alternatives were discussed  Consent given by: patient  Time out: Immediately prior to procedure a "time out" was called to verify the correct patient, procedure, equipment, support staff and site/side marked as required.   Timeout called at: 11/6/2023 10:22 AM.  Patient understanding: patient states understanding of the procedure being performed  Patient identity confirmed: verbally with patient    Performed by: physician  Debridement type: surgical  Level of debridement: subcutaneous tissue  Pain control: lidocaine 4%  Post-debridement measurements  Length (cm): 2.1  Width (cm): 2.2  Depth (cm): 0.7  Percent debrided: 100%  Surface Area (cm^2): 4.6  Area debrided (cm^2): 4.6  Volume (cm^3): 3.2  Tissue and other material debrided: dermis, epidermis and subcutaneous tissue  Devitalized tissue debrided: fibrin and slough  Instrument(s) utilized: blade  Bleeding: medium  Hemostasis obtained with: pressure  Procedural pain (0-10): 0  Post-procedural pain: 0   Response to treatment: procedure was tolerated well    Debridement   Wound 09/11/23 Diabetic Ulcer Foot Left;Plantar    Universal Protocol:  Consent: Verbal consent obtained. Risks and benefits: risks, benefits and alternatives were discussed  Consent given by: patient  Time out: Immediately prior to procedure a "time out" was called to verify the correct patient, procedure, equipment, support staff and site/side marked as required. Timeout called at: 11/6/2023 10:23 AM.  Patient understanding: patient states understanding of the procedure being performed  Patient identity confirmed: verbally with patient    Performed by: physician  Debridement type: surgical  Level of debridement: subcutaneous tissue  Pain control: lidocaine 4%  Post-debridement measurements  Length (cm): 2.8  Width (cm): 1.9  Depth (cm): 0.5  Percent debrided: 100%  Surface Area (cm^2): 5.3  Area debrided (cm^2): 5.3  Volume (cm^3): 2.7  Tissue and other material debrided: dermis, epidermis and subcutaneous tissue  Devitalized tissue debrided: fibrin and slough  Instrument(s) utilized: blade  Bleeding: medium  Hemostasis obtained with: pressure  Procedural pain (0-10): 0  Post-procedural pain: 0   Response to treatment: procedure was tolerated well       Results from last 6 Months   Lab Units 08/09/23  0945   WOUND CULTURE  4+ Growth of       Lab Results   Component Value Date    HGBA1C 10.2 (H) 08/24/2023       Wound Instructions:  Orders Placed This Encounter   Procedures   • Wound cleansing and dressings     Bilateral foot wounds     May shower with protection only. Keep dressings clean,dry, and intact      Cleanse wounds with normal saline. Pat dry  Apply nickel thick santyl to all wounds. (one time application of medihoney used at wound care today)  DO NOT USE A FOAM DRESSING AS IT PULLS THE SANTYL AWAY FROM THE WOUND BED.    Cover with ABD  Secure with tita wrap and tape  Change dressing daily      Treatment Today:  Dr. Britt Portillo debrided your wounds, we cleansed with normal saline. Dakin's wet to dry dressing applied today. Patient is to be non-weight bearing at this time. Patient to have 3-4 servings of protein daily     Follow up in wound center in two weeks.      Standing Status:   Future     Standing Expiration Date:   11/6/2024   • Debridement     This order was created via procedure documentation   • Debridement     This order was created via procedure documentation             Romero Bowens DPM

## 2023-11-08 ENCOUNTER — NURSING HOME VISIT (OUTPATIENT)
Dept: GERIATRICS | Facility: OTHER | Age: 63
End: 2023-11-08
Payer: COMMERCIAL

## 2023-11-08 DIAGNOSIS — E78.5 DYSLIPIDEMIA: ICD-10-CM

## 2023-11-08 DIAGNOSIS — Z79.4 TYPE 2 DIABETES MELLITUS WITH HYPERGLYCEMIA, WITH LONG-TERM CURRENT USE OF INSULIN (HCC): Primary | ICD-10-CM

## 2023-11-08 DIAGNOSIS — I10 PRIMARY HYPERTENSION: ICD-10-CM

## 2023-11-08 DIAGNOSIS — I26.99 ACUTE PULMONARY EMBOLISM, UNSPECIFIED PULMONARY EMBOLISM TYPE, UNSPECIFIED WHETHER ACUTE COR PULMONALE PRESENT (HCC): ICD-10-CM

## 2023-11-08 DIAGNOSIS — E11.65 TYPE 2 DIABETES MELLITUS WITH HYPERGLYCEMIA, WITH LONG-TERM CURRENT USE OF INSULIN (HCC): Primary | ICD-10-CM

## 2023-11-08 PROCEDURE — 99309 SBSQ NF CARE MODERATE MDM 30: CPT | Performed by: INTERNAL MEDICINE

## 2023-11-08 NOTE — PROGRESS NOTES
[address]  [phone]   [facility]   POS 32      NAME: Michael Terrazas  AGE: 70 y.o. SEX: female [phone]    DATE OF ENCOUNTER: 11/8/2023    Assessment and Plan     1. Type 2 diabetes mellitus with hyperglycemia, with long-term current use of insulin ([address])        2. Acute pulmonary embolism, unspecified pulmonary embolism type, unspecified whether acute cor pulmonale present ([address])        3. Primary hypertension        4. Dyslipidemia           Type 2 diabetes mellitus with hyperglycemia (HCC)  Sugars better controlled now  Pt even has symptoms of dizziness now  Move am lantus time from 6 am to 9 am  Reduce am dose from 18 to 15  Add midnight snack  Lab Results   Component Value Date    HGBA1C 10.2 (H) 08/24/2023   Cont glucophage 1000 mg po BID    143.0 mg/dL 11/8/2023 11:35     11/8/2023 05:34 88.0 mg/dL     11/7/2023 21:12 139.0 mg/dL     11/7/2023 20:06 139.0 mg/dL     11/7/2023 15:55 161.0 mg/dL     11/7/2023 12:00 82.0 mg/dL     11/7/2023 05:00 78.0 mg/dL     11/6/2023 21:07 142.0 mg/dL     11/6/2023 20:23 142.0 mg/dL     11/6/2023 16:20 156.0 mg/dL     11/6/2023 12:00 106.0 mg/dL     11/6/2023 06:18 76.0 mg/dL     11/6/2023 06:18 76.0 mg/dL     11/5/2023 21:07 152.0 mg/dL     11/5/2023 21:05 152.0 mg/dL     11/5/2023 16:28 167.0 mg/dL     11/5/2023 11:35 113.0 mg/dL     11/5/2023 05:23 72.0 mg/dL     11/5/2023 05:22 72.0 mg/dL         Pulmonary embolism (HCC)  Stable  Cont eliquis 5 mg 1 tab po BID    Hypertension  10/23/2023 16:54 144 / 80 mmHg   BP stable  Goal SBP <130/80  Trend has been controlled (129/67)  Cont Norvasc 5 mg 1 tab po daily  Cont metoprolol 100 mg 1 tab po BID  Cont cozaar 100 mg 1 tab po daily  Cont hydralazine 10 mg 1 tab po TID    Dyslipidemia  Stable  Cont lipitor 40 mg 1 tab po QHS         Chief Complaint     Acute follow-up visit. History of Present Illness   Asked by nurse to see pt.   Pt c/o dizziness early am & also again at 6 am. Sugar was 100. Will lower lantus to 15 units SQ BID with hold parameters. Also reached out to pt's podiatrist as pt NWB for 2 weeks. Pt says she did not eat a midnight snack last night. She has back up cookies or OJ at times. Also added midnight snack. The following portions of the patient's history were reviewed and updated as appropriate: allergies, current medications, past family history, past medical history, past social history, past surgical history and problem list.    Review of Systems     Review of Systems   Musculoskeletal:  Negative for arthralgias. Skin:  Positive for wound.    Chronic b/l feet wounds      Active Problem List     Patient Active Problem List   Diagnosis    Uncontrolled type 2 diabetes mellitus with hyperglycemia (HCC)    Seizures (HCC)    Exertional dyspnea    Enlarged pulmonary artery (HCC)    Aortic dilatation (HCC)    Anemia    Decreased pulses in feet    Hyperlipidemia    Microalbuminuria    Neuropathy of hand, right    QT prolongation    Visual impairment in both eyes    Vitamin D deficiency    Lung nodules    Orthostatic hypotension    Mild intermittent asthma without complication    Type 2 diabetes mellitus with microalbuminuria, with long-term current use of insulin (HCC)    Other inflammatory and immune myopathies, not elsewhere classified    Morbid obesity (HCC)    Polyneuropathy associated with underlying disease ([address])    PMR (polymyalgia rheumatica) (HCC)    Thrombocytosis    Left cavernous carotid aneurysm    Type 2 diabetes mellitus with hyperglycemia, with long-term current use of insulin (HCC)    Aneurysm (HCC)    Thyroid nodule    History of absence seizures    Hypersomnia    Cerebral aneurysm without rupture    Chronic migraine without aura without status migrainosus, not intractable    Hypertension    Depressed mood    Blurry vision, bilateral    Ulnar neuropathy of right upper extremity    Polymyalgia rheumatica ([address])    Gait instability    Stage 3a chronic kidney disease (HCC)    Obstructive sleep apnea    Diabetic neuropathy (HCC)    Unsteadiness on feet    Abnormal urinalysis    Weakness    Intertrigo    Leg numbness    Unsheltered homelessness    Bilateral lower extremity edema    Diarrhea    Dyslipidemia    Insulin long-term use (HCC)    Type 2 diabetes mellitus with hyperglycemia (HCC)    Ambulatory dysfunction    Legally blind    Suspected pressure injury of deep tissue    Calculus of gallbladder without cholecystitis without obstruction    Severe sepsis (HCC)    Diabetes mellitus ([facility])    Abnormal CT scan    Friction blisters of the soles    Leukocytosis    Encounter for support and coordination of transition of care    Food insecurity    Homelessness    Ulcer of left heel ([address])    Pulmonary embolism ([address])    Diabetic foot ulcer ([address])    Acute viral conjunctivitis of both eyes    Cellulitis of lower extremity    Chronic anemia       Objective     Vitals: Reviewed in [employer] system. VSS    General: Awake, alert & oriented  Head: atraumatic, normocephalic  Cardiovascular: RRR  Lungs: Clear to auscultation bilaterally  Abdomen: nontender/nondistended, +BS  Legs: no cyanosis, clubbing or edema  Feet: dressing c/d/i  Skin: clean, dry  Psych: calm, cooperative    Pertinent Laboratory/Diagnostic Studies:  Refer to facility chart. Current Medications   Medications reviewed and updated in facility chart.     Neurontin Oral Capsule 100 MG (Gabapentin) Give 1 capsule by mouth two times a day for neuropathy related to POLYNEUROPATHY, UNSPECIFIED (G62.9)   Enema Disposable Enema (Sodium Phosphates) Insert 1 application rectally as needed for Constipation 1 Time a Day, If NO BM within 3-5 Hours of Suppository Insertion Give Enema   HydrALAZINE HCl Tablet 10 MG Give 1 tablet by mouth three times a day for HTN related to ESSENTIAL (PRIMARY) HYPERTENSION (I10)   Eliquis Oral Tablet 5 MG (Apixaban) Give 1 tablet by mouth two times a day for PE related to OTHER PULMONARY EMBOLISM WITHOUT ACUTE COR PULMONALE (I26.99)   Acetaminophen Tablet 325 MG Give 2 tablet by mouth every 4 hours as needed for Mild Pain Max 3 GM APAP / 24 HR, Give Suppository If Unable to Take By Mouth AND Give 2 tablet by mouth every 4 hours as needed for Temperature = or > 100 degrees Oral / 101 degrees Rectal Max 3 GM APAP / 24 HR, Give Suppository If Unable to Take By Mouth   Vitamin D3 Oral Capsule 1000 UNIT (Cholecalciferol) Give 2 capsule by mouth one time a day for vit. def. related to VITAMIN D DEFICIENCY, UNSPECIFIED (E55.9) 2 caps = 2000 units   Cozaar Oral Tablet 100 MG (Losartan Potassium) Give 1 tablet by mouth one time a day for HTN related to ESSENTIAL (PRIMARY) HYPERTENSION (I10)   Lopressor Oral Tablet 100 MG (Metoprolol Tartrate) Give 1 tablet by mouth every 12 hours for HTN related to ESSENTIAL (PRIMARY) HYPERTENSION (I10)   Colace Oral Capsule 100 MG (Docusate Sodium) Give 1 capsule by mouth two times a day for constipation related to CONSTIPATION, UNSPECIFIED (K59.00)   Norvasc Oral Tablet 5 MG (Amlodipine Besylate) Give 1 tablet by mouth one time a day for HTN related to ESSENTIAL (PRIMARY) HYPERTENSION (I10)   Topamax Oral Tablet 100 MG (Topiramate) Give 1 tablet by mouth at bedtime for pain related to PAIN, UNSPECIFIED (R52)   Lipitor Oral Tablet 40 MG (Atorvastatin Calcium) Give 1 tablet by mouth one time a day for HLD related to HYPERLIPIDEMIA, UNSPECIFIED (E78.5)   Thera-M Oral Tablet (Multiple Vitamins w/ Minerals) Give 1 tablet by mouth one time a day for vit. def. related to VITAMIN DEFICIENCY, UNSPECIFIED (E56.9)   Milk of Magnesia Suspension 2400 MG/30ML Give 30 ml by mouth as needed for Constipation 1 Time a Day, If No BM on by Day # 2, Evening Nurse Gives a Laxative on Day # 2 AND Give 30 ml by mouth as needed for Constipation 1 Time a Day, If No BM on by Day # 3, Evening Nurse Gives a Laxative on Day # 3   Acetaminophen Suppository 650 MG Insert 1 suppository rectally every 4 hours as needed for Mild Pain Max 3 GM APAP / 24 HR, Give Suppository If Unable to Take By Mouth AND Insert 1 suppository rectally every 4 hours as needed for Temperature = or > 100 degrees Oral / 101 degrees Rectal Max 3 GM APAP / 24 HR, Give Suppository If Unable to Take By Mouth   Bisac-Evac Suppository 10 MG (Bisacodyl) Insert 1 suppository rectally as needed for Constipation 1 Time a Day, If NO BM by AM Day # 4, Day Nurse Gives a Suppository that AM   HumaLOG KwikPen Subcutaneous Solution Pen-injector 100 UNIT/ML (Insulin Lispro) Inject 5 unit subcutaneously with meals for DM 2 related to TYPE 2 DIABETES MELLITUS WITH HYPERGLYCEMIA (E11.65)   Glucophage Oral Tablet 500 MG (Metformin HCl) Give 2 tablet by mouth two times a day for DM 2 related to TYPE 2 DIABETES MELLITUS WITH HYPERGLYCEMIA (E11.65) 2 tabs = 1000mg BID w/ meals   Advair Diskus Inhalation Aerosol Powder Breath Activated 250-50 MCG/ACT (Fluticasone-Salmeterol) 1 puff inhale orally two times a day for COPD related to CHRONIC OBSTRUCTIVE PULMONARY DISEASE, UNSPECIFIED (J44.9) rinse mouth after use   predniSONE Oral Tablet 1 MG (Prednisone) Give 4 tablet by mouth one time a day for polymyalgia rheumatica related to POLYMYALGIA RHEUMATICA (M35.3) 4 tabs = 4mg QD   Biofreeze External Gel 4 % (Menthol (Topical Analgesic)) Apply to B/L knees topically every 6 hours as needed for pain/discomfort related to PAIN, UNSPECIFIED (R52)   ProSource No Carb Oral Liquid (Nutritional Supplements) Give 30 ml by mouth two times a day for Nutritional Supplement   Collagenase Ointment 250 UNIT/GM Apply to R distal plantar foot topically every day shift for diabetic ulcer Cleanse (R) distal plantar foot diabetic ulcer with NSS, apply shin thick santyl to wound base, cover with ABD and secure with tita daily.    Collagenase Ointment 250 UNIT/GM Apply to R proximal plantar foot topically every day shift for diabetic ulcer Cleanse (R) proximal plantar foot diabetic ulcer with NSS, apply shin thick santyl to wound base, cover with ABD and secure with tita   Collagenase Ointment 250 UNIT/GM Apply to L plantar foot ulcer topically every day shift for diabetic ulcer Cleanse (L) plantar foot diabetic ulcer with NSS, apply shin thick Santyl to wound base, cover with ABD and secure with tita.    Lantus SoloStar Subcutaneous Solution Pen-injector 100 UNIT/ML (Insulin Glargine) Inject 15 unit subcutaneously every 12 hours related to TYPE 2 DIABETE       Trent Baldwin MD  Internal Medicine  Senior Care Physician

## 2023-11-08 NOTE — ASSESSMENT & PLAN NOTE
Sugars better controlled now  Pt even has symptoms of dizziness now  Move am lantus time from 6 am to 9 am  Reduce am dose from 18 to 15  Add midnight snack  Lab Results   Component Value Date    HGBA1C 10.2 (H) 08/24/2023   Cont glucophage 1000 mg po BID    143.0 mg/dL 11/8/2023 11:35     11/8/2023 05:34 88.0 mg/dL     11/7/2023 21:12 139.0 mg/dL     11/7/2023 20:06 139.0 mg/dL     11/7/2023 15:55 161.0 mg/dL     11/7/2023 12:00 82.0 mg/dL     11/7/2023 05:00 78.0 mg/dL     11/6/2023 21:07 142.0 mg/dL     11/6/2023 20:23 142.0 mg/dL     11/6/2023 16:20 156.0 mg/dL     11/6/2023 12:00 106.0 mg/dL     11/6/2023 06:18 76.0 mg/dL     11/6/2023 06:18 76.0 mg/dL     11/5/2023 21:07 152.0 mg/dL     11/5/2023 21:05 152.0 mg/dL     11/5/2023 16:28 167.0 mg/dL     11/5/2023 11:35 113.0 mg/dL     11/5/2023 05:23 72.0 mg/dL     11/5/2023 05:22 72.0 mg/dL

## 2023-11-08 NOTE — ASSESSMENT & PLAN NOTE
10/23/2023 16:54 144 / 80 mmHg   BP stable  Goal SBP <130/80  Trend has been controlled (129/67)  Cont Norvasc 5 mg 1 tab po daily  Cont metoprolol 100 mg 1 tab po BID  Cont cozaar 100 mg 1 tab po daily  Cont hydralazine 10 mg 1 tab po TID

## 2023-11-20 ENCOUNTER — OFFICE VISIT (OUTPATIENT)
Dept: WOUND CARE | Facility: CLINIC | Age: 63
End: 2023-11-20
Payer: COMMERCIAL

## 2023-11-20 VITALS
DIASTOLIC BLOOD PRESSURE: 82 MMHG | HEART RATE: 72 BPM | RESPIRATION RATE: 16 BRPM | TEMPERATURE: 97.3 F | SYSTOLIC BLOOD PRESSURE: 150 MMHG

## 2023-11-20 DIAGNOSIS — Z79.4 TYPE 2 DIABETES MELLITUS WITH DIABETIC NEUROPATHY, WITH LONG-TERM CURRENT USE OF INSULIN (HCC): ICD-10-CM

## 2023-11-20 DIAGNOSIS — L97.512 ULCER OF RIGHT FOOT, WITH FAT LAYER EXPOSED (HCC): Primary | ICD-10-CM

## 2023-11-20 DIAGNOSIS — E11.40 TYPE 2 DIABETES MELLITUS WITH DIABETIC NEUROPATHY, WITH LONG-TERM CURRENT USE OF INSULIN (HCC): ICD-10-CM

## 2023-11-20 DIAGNOSIS — L97.522 ULCER OF LEFT FOOT, WITH FAT LAYER EXPOSED (HCC): ICD-10-CM

## 2023-11-20 DIAGNOSIS — L97.412 ULCER OF RIGHT MIDFOOT WITH FAT LAYER EXPOSED (HCC): ICD-10-CM

## 2023-11-20 PROCEDURE — 99213 OFFICE O/P EST LOW 20 MIN: CPT | Performed by: PODIATRIST

## 2023-11-20 PROCEDURE — 11042 DBRDMT SUBQ TIS 1ST 20SQCM/<: CPT | Performed by: PODIATRIST

## 2023-11-20 NOTE — PATIENT INSTRUCTIONS
Orders Placed This Encounter   Procedures    Wound cleansing and dressings     Bilateral foot wounds     May shower with protection only. Keep dressings clean,dry, and intact      Cleanse wounds with normal saline. Pat dry  Apply nickel thick santyl to all wounds. (one time application of silver gel used at wound care today)  DO NOT USE A FOAM DRESSING AS IT PULLS THE SANTYL AWAY FROM THE WOUND BED. Cover with ABD  Secure with tita wrap and tape  Change dressing daily        Patient is to be non-weight bearing at this time.  Patient may heel touch to Left foot for transfers and Physical Therapy      Patient to have 3-4 servings of protein daily     Standing Status:   Future     Standing Expiration Date:   11/20/2024

## 2023-11-20 NOTE — PROGRESS NOTES
Patient ID: Patricia Cardenas is a 70 y.o. female Date of Birth 1960       Chief Complaint   Patient presents with    Follow Up Wound Care Visit     Bilateral feet wound        Allergies:  Patient has no known allergies. Assessments:      Diagnosis ICD-10-CM Associated Orders   1. Ulcer of right foot, with fat layer exposed ([address])  L97.512 Wound cleansing and dressings      2. Ulcer of right midfoot with fat layer exposed ([address])  L97.412 Wound cleansing and dressings     Debridement      3. Ulcer of left foot, with fat layer exposed ([address])  L97.522 Wound cleansing and dressings      4. Type 2 diabetes mellitus with diabetic neuropathy, with long-term current use of insulin (HCC)  E11.40 Wound cleansing and dressings    Z79.4 Debridement               Plan:  1. Reviewed medical records. Patient was counseled and educated on the condition and the diagnosis. 2. The diagnosis, treatment options and prognosis were discussed with the patient. 3. Wounds are healing. Continue serial debridement and Santyl. 4. Stressed on patient compliance about proper off-loading, and staying off of foot to reduce the risk of infection, non-healing, and limb loss. 5. Patient will return in 2 weeks for re-evaluation. Imaging: I have personally reviewed pertinent films in PACS  Labs, pathology, and Other Studies: I have personally reviewed pertinent reports. Subjective:   HPI  The patient presents for wound care in both feet. No pain. No new complaint.         The following portions of the patient's history were reviewed and updated as appropriate:   Patient Active Problem List   Diagnosis    Uncontrolled type 2 diabetes mellitus with hyperglycemia (HCC)    Seizures ([address])    Exertional dyspnea    Enlarged pulmonary artery (HCC)    Aortic dilatation (HCC)    Anemia    Decreased pulses in feet    Hyperlipidemia    Microalbuminuria    Neuropathy of hand, right    QT prolongation    Visual impairment in both eyes Vitamin D deficiency    Lung nodules    Orthostatic hypotension    Mild intermittent asthma without complication    Type 2 diabetes mellitus with microalbuminuria, with long-term current use of insulin (HCC)    Other inflammatory and immune myopathies, not elsewhere classified    Morbid obesity (HCC)    Polyneuropathy associated with underlying disease (HCC)    PMR (polymyalgia rheumatica) ([facility])    Thrombocytosis    Left cavernous carotid aneurysm    Type 2 diabetes mellitus with hyperglycemia, with long-term current use of insulin (HCC)    Aneurysm (HCC)    Thyroid nodule    History of absence seizures    Hypersomnia    Cerebral aneurysm without rupture    Chronic migraine without aura without status migrainosus, not intractable    Hypertension    Depressed mood    Blurry vision, bilateral    Ulnar neuropathy of right upper extremity    Polymyalgia rheumatica (HCC)    Gait instability    Stage 3a chronic kidney disease ([facility])    Obstructive sleep apnea    Diabetic neuropathy ([facility])    Unsteadiness on feet    Abnormal urinalysis    Weakness    Intertrigo    Leg numbness    Unsheltered homelessness    Bilateral lower extremity edema    Diarrhea    Dyslipidemia    Insulin long-term use (HCC)    Type 2 diabetes mellitus with hyperglycemia (HCC)    Ambulatory dysfunction    Legally blind    Suspected pressure injury of deep tissue    Calculus of gallbladder without cholecystitis without obstruction    Severe sepsis (HCC)    Diabetes mellitus (HCC)    Abnormal CT scan    Friction blisters of the soles    Leukocytosis    Encounter for support and coordination of transition of care    Food insecurity    Homelessness    Ulcer of left heel ([address])    Pulmonary embolism ([address])    Diabetic foot ulcer ([address])    Acute viral conjunctivitis of both eyes    Cellulitis of lower extremity    Chronic anemia     Past Medical History:   Diagnosis Date    Anemia     Benign hypertension     Procedure/Onset: 01/01/2005;  Description: BP elevated today. Pt reports running out of BP meds 2wks ago. Will resume BP meds.     Diabetes mellitus ([address])     Diabetes mellitus type 2 with complications, uncontrolled 9/8/2015    Dyspnea on exertion     Hyperlipidemia     Hypertension     Legally blind 2010    Miscarriage     x 3    Polymyalgia rheumatica ([address]) 11/24/2021    Seizures (HCC)     Sickle cell trait ([address])     Stage 3a chronic kidney disease ([address]) 5/19/2022    Thyroid nodule 3/6/2020     Past Surgical History:   Procedure Laterality Date    CATARACT EXTRACTION Bilateral     august and september    EYE SURGERY      HYSTERECTOMY      42    OOPHORECTOMY Left     37    NH LIGATION/BIOPSY TEMPORAL ARTERY Bilateral 10/17/2019    Procedure: BIOPSY ARTERY TEMPORAL;  Surgeon: Radha Hutchison DO;  Location: [facility];  Service: Vascular    US GUIDED THYROID BIOPSY  5/13/2020     Family History   Problem Relation Age of Onset    Heart attack Mother     Heart disease Mother     Hypertension Mother     No Known Problems Father     Heart disease Sister     No Known Problems Sister     No Known Problems Sister     No Known Problems Daughter     Heart attack Maternal Grandmother     Heart disease Maternal Grandmother     No Known Problems Brother     No Known Problems Son     No Known Problems Son     No Known Problems Paternal Aunt     Diabetes Other     Heart attack Other     Cancer Neg Hx     Stroke Neg Hx       Social History     Socioeconomic History    Marital status: /Civil Union     Spouse name: Not on file    Number of children: Not on file    Years of education: Not on file    Highest education level: Not on file   Occupational History    Occupation: long term disability   Tobacco Use    Smoking status: Never    Smokeless tobacco: Never   Vaping Use    Vaping Use: Never used   Substance and Sexual Activity    Alcohol use: Never     Alcohol/week: 0.0 standard drinks of alcohol    Drug use: No    Sexual activity: Not Currently     Partners: Male     Birth control/protection: None   Other Topics Concern    Not on file   Social History Narrative    Caffeine use    Resides with spouse and one son     Social Determinants of Health     Financial Resource Strain: Low Risk  (9/15/2020)    Overall Financial Resource Strain (CARDIA)     Difficulty of Paying Living Expenses: Not very hard   Food Insecurity: Food Insecurity Present (9/26/2023)    Hunger Vital Sign     Worried About Running Out of Food in the Last Year: Often true     Ran Out of Food in the Last Year: Often true   Transportation Needs: Unmet Transportation Needs (9/26/2023)    PRAPARE - Transportation     Lack of Transportation (Medical): Yes     Lack of Transportation (Non-Medical): Yes   Physical Activity: Inactive (2/3/2020)    Exercise Vital Sign     Days of Exercise per Week: 0 days     Minutes of Exercise per Session: 0 min   Stress: No Stress Concern Present (2/3/2020)    [facility]     Feeling of Stress :  Only a little   Social Connections: Unknown (2/3/2020)    Social Connection and Isolation Panel [NHANES]     Frequency of Communication with Friends and Family: Twice a week     Frequency of Social Gatherings with Friends and Family: Twice a week     Attends Gnosticism Services: Not on file     Active Member of Clubs or Organizations: Not on file     Attends Club or Organization Meetings: Not on file     Marital Status:    Intimate Partner Violence: Not on file   Housing Stability: High Risk (9/26/2023)    Housing Stability Vital Sign     Unable to Pay for Housing in the Last Year: Yes     Number of [address] in the Last Year: 3     Unstable Housing in the Last Year: Yes        Current Outpatient Medications:     acetaminophen (TYLENOL) 325 mg tablet, Take 650 mg by mouth every 6 (six) hours as needed for mild pain, Disp: , Rfl:     amLODIPine (NORVASC) 5 mg tablet, Take 1 tablet (5 mg total) by mouth daily, Disp: 30 tablet, Rfl: 3    apixaban (ELIQUIS) 5 mg, Take 1 tablet (5 mg total) by mouth 2 (two) times a day Do not start before August 17, 2023., Disp: , Rfl: 0    atorvastatin (LIPITOR) 40 mg tablet, Take 1 tablet (40 mg total) by mouth daily, Disp: 90 tablet, Rfl: 3    Blood Glucose Monitoring Suppl (OneTouch Verio Reflect) w/Device KIT, Use 1 each 4 (four) times a day (Patient not taking: Reported on 9/11/2023), Disp: 1 kit, Rfl: 0    Blood Pressure Monitor KIT, Check blood pressures BID (Patient not taking: Reported on 9/11/2023), Disp: 1 kit, Rfl: 0    cholecalciferol (VITAMIN D3) 1,000 units tablet, Take 2 tablets (2,000 Units total) by mouth daily, Disp: 60 tablet, Rfl: 2    collagenase (SANTYL) ointment, Apply 1 Application topically daily Apply to right distal plantar foot diabetic ulcer, apply to right proximal plantar foot diabetic ulcer, apply to left plantar foot diabetic ulcer, Disp: , Rfl:     Continuous Blood Gluc  (FreeStyle Denisa 2 Veguita) [facility], Use 1 each 4 (four) times a day (Patient not taking: Reported on 9/11/2023), Disp: 1 each, Rfl: 0    Continuous Blood Gluc Sensor (FreeStyle Denisa 2 Sensor) MISC, Use 1 each every 14 (fourteen) days (Patient not taking: Reported on 9/11/2023), Disp: 2 each, Rfl: 0    docusate sodium (COLACE) 100 mg capsule, Take 1 capsule (100 mg total) by mouth 2 (two) times a day, Disp: 60 capsule, Rfl: 0    Fluticasone-Salmeterol (Advair) 250-50 mcg/dose inhaler, Inhale 1 puff 2 (two) times a day Rinse mouth after use., Disp: , Rfl:     gabapentin (NEURONTIN) 100 mg capsule, Take 1 capsule (100 mg total) by mouth 2 (two) times a day, Disp: 60 capsule, Rfl: 3    hydrALAZINE (APRESOLINE) 10 mg tablet, Take 1 tablet (10 mg total) by mouth 3 (three) times a day, Disp: 90 tablet, Rfl: 3    insulin glargine (LANTUS) 100 units/mL subcutaneous injection, Inject 18 Units under the skin every 12 (twelve) hours (Patient taking differently: Inject 15 Units under the skin every 12 (twelve) hours), Disp: 10 mL, Rfl: 0    insulin lispro (HumaLOG KwikPen) 100 units/mL injection pen, Inject 5 Units under the skin 3 (three) times a day with meals, Disp: , Rfl:     Insulin Pen Needle (BD Pen Needle Tita 2nd Gen) 32G X 4 MM MISC, For use with insulin pen. Pharmacy may dispense brand covered by insurance.  (Patient not taking: Reported on 9/11/2023), Disp: 100 each, Rfl: 0    Insulin Pen Needle (BD Pen Needle Tita U/F) 32G X 4 MM MISC, Use four times daily as directed with insulin pen (Patient not taking: Reported on 9/11/2023), Disp: 200 each, Rfl: 3    Lancets (OneTouch Delica Plus [id number]) MISC, Use 1 each 4 (four) times a day (Patient not taking: Reported on 9/11/2023), Disp: 200 each, Rfl: 2    lidocaine (LIDODERM) 5 %, Apply 1 patch topically over 12 hours daily Remove & Discard patch within 12 hours or as directed by MD Do not start before October 1, 2023., Disp: , Rfl: 0    losartan (COZAAR) 100 MG tablet, Take 1 tablet (100 mg total) by mouth daily, Disp: 30 tablet, Rfl: 5    Menthol, Topical Analgesic, (Biofreeze) 4 % GEL, Apply 1 Application topically 2 (two) times a day, Disp: , Rfl:     metFORMIN (GLUCOPHAGE) 1000 MG tablet, Take 1 tablet (1,000 mg total) by mouth 2 (two) times a day with meals, Disp: 60 tablet, Rfl: 3    metoprolol tartrate (LOPRESSOR) 100 mg tablet, Take 1 tablet (100 mg total) by mouth every 12 (twelve) hours, Disp: 60 tablet, Rfl: 3    multivitamin-iron-minerals-folic acid (THERAPEUTIC-M) TABS tablet, Take 1 tablet by mouth daily, Disp: 30 tablet, Rfl: 2    nystatin (MYCOSTATIN) powder, Apply topically 2 (two) times a day, Disp: , Rfl: 0    predniSONE 1 mg tablet, Take 4 tablets (4 mg total) by mouth daily, Disp: 120 tablet, Rfl: 0    topiramate (TOPAMAX) 100 mg tablet, Take 1 tablet (100 mg total) by mouth daily at bedtime (Patient taking differently: Take 100 mg by mouth daily at bedtime), Disp: 30 tablet, Rfl: 0    Review of Systems   Constitutional:  Negative for chills and fever.   Respiratory:  Negative for cough and shortness of breath. Cardiovascular:  Negative for chest pain. Gastrointestinal:  Negative for nausea and vomiting. Skin:  Positive for wound. Neurological:  Positive for numbness. Negative for weakness. Objective:  /82   Pulse 72   Temp (!) 97.3 °F (36.3 °C)   Resp 16         Physical Exam  Vitals and nursing note reviewed. Constitutional:       General: She is not in acute distress. Appearance: She is obese. She is not toxic-appearing. Cardiovascular:      Rate and Rhythm: Normal rate and regular rhythm. Pulses: Decreased pulses. Dorsalis pedis pulses are detected w/ Doppler on the right side and detected w/ Doppler on the left side. Posterior tibial pulses are detected w/ Doppler on the right side and detected w/ Doppler on the left side. Comments: Biphasic signals bilateral DP and PTA. Pulmonary:      Effort: Pulmonary effort is normal. No respiratory distress. Musculoskeletal:         General: No tenderness or signs of injury. Right lower leg: Edema present. Left lower leg: Edema present. Skin:     General: Skin is warm. Capillary Refill: Capillary refill takes less than 2 seconds. Coloration: Skin is not cyanotic or mottled. Findings: Wound present. No abscess. Nails: There is no clubbing. Comments: Full thickness ulcers in right submet 5 head, submet 5 base, and left submet 5. Increased granular tissues. Decrease in size and depth. Wounds do not probe to bone. No active infection. See wound assessment and photo. Neurological:      General: No focal deficit present. Mental Status: She is alert and oriented to person, place, and time. Cranial Nerves: No cranial nerve deficit. Sensory: Sensory deficit present.       Coordination: Coordination normal.   Psychiatric:         Mood and Affect: Mood normal.         Behavior: Behavior normal. Thought Content: Thought content normal.         Judgment: Judgment normal.         Wound 09/11/23 Diabetic Ulcer Foot Left;Plantar (Active)   Wound Image Images linked 11/20/23 0928   Wound Description Yellow;Pink;Granulation tissue;Slough 11/20/23 0938   Soraya-wound Assessment Maceration 11/20/23 0938   Wound Length (cm) 2.4 cm 11/20/23 0938   Wound Width (cm) 1.8 cm 11/20/23 0938   Wound Depth (cm) 0.3 cm 11/20/23 0938   Wound Surface Area (cm^2) 4.32 cm^2 11/20/23 0938   Wound Volume (cm^3) 1.296 cm^3 11/20/23 0938   Calculated Wound Volume (cm^3) 1.3 cm^3 11/20/23 0938   Drainage Amount Moderate 11/20/23 0938   Drainage Description Serosanguineous; Ruiz 11/20/23 0938   Non-staged Wound Description Full thickness 11/20/23 0938   Dressing Status Intact 11/20/23 0938       Wound 09/11/23 Diabetic Ulcer Foot Right;Plantar;Distal (Active)   Wound Image Images linked 11/20/23 0937   Wound Description Granulation tissue; Yellow;Slough;Pink 11/20/23 0939   Soraya-wound Assessment Scar Tissue;Dry;Scaly 11/20/23 0939   Wound Length (cm) 1.3 cm 11/20/23 0939   Wound Width (cm) 1.6 cm 11/20/23 0939   Wound Depth (cm) 0.1 cm 11/20/23 0939   Wound Surface Area (cm^2) 2.08 cm^2 11/20/23 0939   Wound Volume (cm^3) 0.208 cm^3 11/20/23 0939   Calculated Wound Volume (cm^3) 0.21 cm^3 11/20/23 0939   Change in Wound Size % 75 11/20/23 0939   Drainage Amount Moderate 11/20/23 0939   Drainage Description Serosanguineous 11/20/23 0939   Non-staged Wound Description Full thickness 11/20/23 0939   Dressing Status Intact 11/20/23 0939       Wound 09/11/23 Diabetic Ulcer Foot Right;Plantar;Proximal (Active)   Wound Image Images linked 11/20/23 0947   Wound Description Granulation tissue;Slough; Yellow 11/20/23 0940   Soraya-wound Assessment Scar Tissue;Dry;Scaly 11/20/23 0940   Wound Length (cm) 1.3 cm 11/20/23 0940   Wound Width (cm) 1.2 cm 11/20/23 0940   Wound Depth (cm) 0.1 cm 11/20/23 0940   Wound Surface Area (cm^2) 1.56 cm^2 11/20/23 0940 Wound Volume (cm^3) 0.156 cm^3 11/20/23 0940   Calculated Wound Volume (cm^3) 0.16 cm^3 11/20/23 0940   Drainage Amount Moderate 11/20/23 0940   Drainage Description Serosanguineous 11/20/23 0940   Non-staged Wound Description Full thickness 11/20/23 0940   Dressing Status Intact 11/20/23 0940       Debridement   Wound 09/11/23 Diabetic Ulcer Foot Right;Plantar;Proximal    Universal Protocol:  Consent: Verbal consent obtained. Risks and benefits: risks, benefits and alternatives were discussed  Consent given by: patient  Time out: Immediately prior to procedure a "time out" was called to verify the correct patient, procedure, equipment, support staff and site/side marked as required. Timeout called at: 11/20/2023 10:00 AM.  Patient understanding: patient states understanding of the procedure being performed  Patient identity confirmed: verbally with patient    Performed by: physician  Debridement type: surgical  Level of debridement: subcutaneous tissue    Post-debridement measurements  Length (cm): 1.3  Width (cm): 1.4  Depth (cm): 0.2  Percent debrided: 100%  Surface Area (cm^2): 1.82  Area debrided (cm^2): 1.82  Volume (cm^3): 0.36  Tissue and other material debrided: dermis, epidermis and subcutaneous tissue  Devitalized tissue debrided: fibrin and slough  Instrument(s) utilized: blade  Bleeding: small  Hemostasis obtained with: pressure  Procedural pain (0-10): 0  Post-procedural pain: 0   Response to treatment: procedure was tolerated well         Results from last 6 Months   Lab Units 08/09/23  0945   WOUND CULTURE  4+ Growth of       Lab Results   Component Value Date    HGBA1C 10.2 (H) 08/24/2023       Wound Instructions:  Orders Placed This Encounter   Procedures    Wound cleansing and dressings     Bilateral foot wounds     May shower with protection only. Keep dressings clean,dry, and intact      Cleanse wounds with normal saline. Pat dry  Apply nickel thick santyl to all wounds.  (one time application of silver gel used at wound care today)  DO NOT USE A FOAM DRESSING AS IT PULLS THE SANTYL AWAY FROM THE WOUND BED. Cover with ABD  Secure with tita wrap and tape  Change dressing daily        Patient is to be non-weight bearing at this time.  Patient may heel touch to Left foot for transfers and Physical Therapy      Patient to have 3-4 servings of protein daily     Standing Status:   Future     Standing Expiration Date:   11/20/2024    Debridement     This order was created via procedure documentation             Sintia Orta DPM

## 2023-11-26 PROBLEM — B30.9 ACUTE VIRAL CONJUNCTIVITIS OF BOTH EYES: Status: RESOLVED | Noted: 2023-09-24 | Resolved: 2023-11-26

## 2023-11-27 PROBLEM — R65.20 SEVERE SEPSIS (HCC): Status: RESOLVED | Noted: 2023-04-12 | Resolved: 2023-11-27

## 2023-11-27 PROBLEM — A41.9 SEVERE SEPSIS (HCC): Status: RESOLVED | Noted: 2023-04-12 | Resolved: 2023-11-27

## 2023-11-28 ENCOUNTER — NURSING HOME VISIT (OUTPATIENT)
Dept: GERIATRICS | Facility: OTHER | Age: 63
End: 2023-11-28
Payer: COMMERCIAL

## 2023-11-28 DIAGNOSIS — I26.99 ACUTE PULMONARY EMBOLISM, UNSPECIFIED PULMONARY EMBOLISM TYPE, UNSPECIFIED WHETHER ACUTE COR PULMONALE PRESENT (HCC): ICD-10-CM

## 2023-11-28 DIAGNOSIS — E78.5 HYPERLIPIDEMIA, UNSPECIFIED HYPERLIPIDEMIA TYPE: ICD-10-CM

## 2023-11-28 DIAGNOSIS — N18.31 STAGE 3A CHRONIC KIDNEY DISEASE (HCC): ICD-10-CM

## 2023-11-28 DIAGNOSIS — R56.9 SEIZURES (HCC): ICD-10-CM

## 2023-11-28 DIAGNOSIS — J06.9 VIRAL UPPER RESPIRATORY ILLNESS: Primary | ICD-10-CM

## 2023-11-28 DIAGNOSIS — D64.9 CHRONIC ANEMIA: ICD-10-CM

## 2023-11-28 DIAGNOSIS — I10 PRIMARY HYPERTENSION: ICD-10-CM

## 2023-11-28 PROCEDURE — 99309 SBSQ NF CARE MODERATE MDM 30: CPT | Performed by: INTERNAL MEDICINE

## 2023-11-28 NOTE — ASSESSMENT & PLAN NOTE
Pt with mild iron deficiency anemia  MCV 76  Iron 33  %sat 11  Start ferrous sulfate 1 tab po every other day

## 2023-11-28 NOTE — ASSESSMENT & PLAN NOTE
Pt with sinus pressure, congestion, cough  Mild expiratory wheezing  Pt saturating 98% on room air  Added albuterol inhaler q6 hours scheduled then prn  Added prn cough medication  Added claritin 10 mg 1 tab po daily

## 2023-11-28 NOTE — ASSESSMENT & PLAN NOTE
11/16/2023 13:47 108 / 60 mmHg   BP stable  Cont norvasc, metoprolol, cozaar and hydralazine  Check vitals qshift x 1 week then re-eval given pt's U.R.I.

## 2023-11-28 NOTE — PROGRESS NOTES
[address]  [phone]   [facility]  POS 32      NAME: Reymundo Walker  AGE: 70 y.o. SEX: female [phone]    DATE OF ENCOUNTER: 11/28/2023    Assessment and Plan     1. Viral upper respiratory illness        2. Primary hypertension        3. Acute pulmonary embolism, unspecified pulmonary embolism type, unspecified whether acute cor pulmonale present (HCC)        4. Stage 3a chronic kidney disease ([facility])        5. Seizures ([facility])        6. Hyperlipidemia, unspecified hyperlipidemia type        7. Chronic anemia           Viral upper respiratory illness  Pt with sinus pressure, congestion, cough  Mild expiratory wheezing  Pt saturating 98% on room air  Added albuterol inhaler q6 hours scheduled then prn  Added prn cough medication  Added claritin 10 mg 1 tab po daily    Hypertension  11/16/2023 13:47 108 / 60 mmHg   BP stable  Cont norvasc, metoprolol, cozaar and hydralazine  Check vitals qshift x 1 week then re-eval given pt's U.R.I. Pulmonary embolism (HCC)  Stable  Pt saturating 98% on room air  Cont eliquis 5 mg 1 tab po BID    Seizures (HCC)  Stable  No seizures noted during stay  Cont topamax    Hyperlipidemia  Stable  Cont atorvastatin 40 mg 1 tab po QHS    Chronic anemia  Pt with mild iron deficiency anemia  MCV 76  Iron 33  %sat 11  Start ferrous sulfate 1 tab po every other day       Code status: CPR    Chief Complaint     Long term follow-up visit. 60 day visit    History of Present Illness   Asked by nurse to see pt as part of 60 day visit. Pt seen and examined. Pt has a cold common/u.r. I. symptoms. Pt with mild expiratory wheezing and saturating 98% on room air. HR 67 on pulse ox. Pt complains of congestion and cough. She had a headache last night. She says she has a podiatry appt next Mon.     The following portions of the patient's history were reviewed and updated as appropriate: allergies, current medications, past family history, past medical history, past social history, past surgical history and problem list.    Review of Systems     Review of Systems   HENT:  Positive for congestion and sinus pressure. Respiratory:  Positive for cough and wheezing. Neurological:  Positive for headaches. All other systems reviewed and are negative.     Active Problem List     Patient Active Problem List   Diagnosis    Uncontrolled type 2 diabetes mellitus with hyperglycemia (HCC)    Seizures (HCC)    Exertional dyspnea    Enlarged pulmonary artery (HCC)    Aortic dilatation (HCC)    Anemia    Decreased pulses in feet    Hyperlipidemia    Microalbuminuria    Neuropathy of hand, right    QT prolongation    Visual impairment in both eyes    Vitamin D deficiency    Lung nodules    Orthostatic hypotension    Mild intermittent asthma without complication    Type 2 diabetes mellitus with microalbuminuria, with long-term current use of insulin (HCC)    Other inflammatory and immune myopathies, not elsewhere classified    Morbid obesity (HCC)    Polyneuropathy associated with underlying disease ([address])    PMR (polymyalgia rheumatica) ([facility])    Thrombocytosis    Left cavernous carotid aneurysm    Type 2 diabetes mellitus with hyperglycemia, with long-term current use of insulin (HCC)    Aneurysm (HCC)    Thyroid nodule    History of absence seizures    Hypersomnia    Cerebral aneurysm without rupture    Chronic migraine without aura without status migrainosus, not intractable    Hypertension    Depressed mood    Blurry vision, bilateral    Ulnar neuropathy of right upper extremity    Polymyalgia rheumatica (HCC)    Gait instability    Stage 3a chronic kidney disease (HCC)    Obstructive sleep apnea    Diabetic neuropathy ([facility])    Unsteadiness on feet    Abnormal urinalysis    Weakness    Intertrigo    Leg numbness    Unsheltered homelessness    Bilateral lower extremity edema    Diarrhea    Dyslipidemia    Insulin long-term use (HCC)    Type 2 diabetes mellitus with hyperglycemia ([address])    Ambulatory dysfunction    Legally blind    Suspected pressure injury of deep tissue    Calculus of gallbladder without cholecystitis without obstruction    Diabetes mellitus (HCC)    Abnormal CT scan    Friction blisters of the soles    Leukocytosis    Encounter for support and coordination of transition of care    Food insecurity    Homelessness    Ulcer of left heel ([address])    Pulmonary embolism ([address])    Diabetic foot ulcer ([address])    Cellulitis of lower extremity    Chronic anemia    Viral upper respiratory illness       Objective     Vitals: Reviewed in [employer] system. VSS    General: Awake, alert & appears oriented  Head: atraumatic, normocephalic  Cardiovascular: RRR  Lungs: Clear to auscultation bilaterally  Abdomen: nontender/nondistended, +BS  Legs: no cyanosis, clubbing; +2 b/l LE edema  Feet: I looked at her right foot - wound appears to be healing well; left foot dressing c/d/i  Skin: clean, dry  Psych: calm, cooperative    Pertinent Laboratory/Diagnostic Studies:  Refer to facility chart. 11/13/2023  CBC   HEMOGLOBIN 10.6 g/dL 11.5-14.5 L Final              HEMATOCRIT 33.3 % 35.0-43.0 L Final             WBC 12.3 thou/cmm 4.0-10.0 H Final             RBC 4.37 mill/cmm 3.70-4.70  Final             PLATELET COUNT 003          MCV 76         11/13/2023  GLUCOSE 121 mg/dL 65-99 H Final              BUN 30 mg/dL 7-25 H Final             CREATININE 0.88 mg/dL 0.40-1.10  Final             SODIUM 142 mmol/L 135-145  Final             POTASSIUM 4.0 mmol/L 3.5-5.2  Final             CHLORIDE 109 mmol/L 100-109  Final             CARBON DIOXIDE 25 mmol/L 21-31  Final             CALCIUM 9.4 mg/dL 8.5-10.1  Final             ALKALINE PHOSPHATASE 87 U/L   Final             ALBUMIN 3.5 g/dL 3.5-5.7  Final             BILIRUBIN,TOTAL 0.2 mg/dL 0.2-1.0  Final     Eltrombopag and its metabolites may interfere with this assay causing   erroneously high patient results.         PROTEIN, TOTAL 6.8 g/dL 6.3-8.3  Final             AST 11 U/L <41  Final             ALT 14 U/L <56  Final             ANION GAP 8  3-11  Final             eGFRcr 73         Iron Profile  IRON 33 ug/dL  L Final              TRANSFERRIN 207 mg/dL 203-362  Final             % SATURATION 11 % 20-50 L Final             TIBC * * 290 ug/dL 260-430  Final     Note: Results obtained for the Total Iron Binding Capacity (TIBC) are   based on a calculation derived from the direct determination of Iron   and Transferrin, and a calculated % saturation. VITAMIN B12 1382 pg/mL 180-914 H Final           VITAMIN D, 25-OH 41 ng/mL   Final     Inter         Current Medications   Medications reviewed and updated in facility chart.     Neurontin Oral Capsule 100 MG (Gabapentin) Give 1 capsule by mouth two times a day for neuropathy related to POLYNEUROPATHY, UNSPECIFIED (G62.9)   Enema Disposable Enema (Sodium Phosphates) Insert 1 application rectally as needed for Constipation 1 Time a Day, If NO BM within 3-5 Hours of Suppository Insertion Give Enema   HydrALAZINE HCl Tablet 10 MG Give 1 tablet by mouth three times a day for HTN related to ESSENTIAL (PRIMARY) HYPERTENSION (I10)   Eliquis Oral Tablet 5 MG (Apixaban) Give 1 tablet by mouth two times a day for PE related to OTHER PULMONARY EMBOLISM WITHOUT ACUTE COR PULMONALE (I26.99)   Acetaminophen Tablet 325 MG Give 2 tablet by mouth every 4 hours as needed for Mild Pain Max 3 GM APAP / 24 HR, Give Suppository If Unable to Take By Mouth AND Give 2 tablet by mouth every 4 hours as needed for Temperature = or > 100 degrees Oral / 101 degrees Rectal Max 3 GM APAP / 24 HR, Give Suppository If Unable to Take By Mouth   Vitamin D3 Oral Capsule 1000 UNIT (Cholecalciferol) Give 2 capsule by mouth one time a day for vit. def. related to VITAMIN D DEFICIENCY, UNSPECIFIED (E55.9) 2 caps = 2000 units   Cozaar Oral Tablet 100 MG (Losartan Potassium) Give 1 tablet by mouth one time a day for HTN related to ESSENTIAL (PRIMARY) HYPERTENSION (I10)   Lopressor Oral Tablet 100 MG (Metoprolol Tartrate) Give 1 tablet by mouth every 12 hours for HTN related to ESSENTIAL (PRIMARY) HYPERTENSION (I10)   Colace Oral Capsule 100 MG (Docusate Sodium) Give 1 capsule by mouth two times a day for constipation related to CONSTIPATION, UNSPECIFIED (K59.00)   Norvasc Oral Tablet 5 MG (Amlodipine Besylate) Give 1 tablet by mouth one time a day for HTN related to ESSENTIAL (PRIMARY) HYPERTENSION (I10)   Topamax Oral Tablet 100 MG (Topiramate) Give 1 tablet by mouth at bedtime for pain related to PAIN, UNSPECIFIED (R52)   Lipitor Oral Tablet 40 MG (Atorvastatin Calcium) Give 1 tablet by mouth one time a day for HLD related to HYPERLIPIDEMIA, UNSPECIFIED (E78.5)   Thera-M Oral Tablet (Multiple Vitamins w/ Minerals) Give 1 tablet by mouth one time a day for vit. def. related to VITAMIN DEFICIENCY, UNSPECIFIED (E56.9)   Milk of Magnesia Suspension 2400 MG/30ML Give 30 ml by mouth as needed for Constipation 1 Time a Day, If No BM on by Day # 2, Evening Nurse Gives a Laxative on Day # 2 AND Give 30 ml by mouth as needed for Constipation 1 Time a Day, If No BM on by Day # 3, Evening Nurse Gives a Laxative on Day # 3   Acetaminophen Suppository 650 MG Insert 1 suppository rectally every 4 hours as needed for Mild Pain Max 3 GM APAP / 24 HR, Give Suppository If Unable to Take By Mouth AND Insert 1 suppository rectally every 4 hours as needed for Temperature = or > 100 degrees Oral / 101 degrees Rectal Max 3 GM APAP / 24 HR, Give Suppository If Unable to Take By Mouth   Bisac-Evac Suppository 10 MG (Bisacodyl) Insert 1 suppository rectally as needed for Constipation 1 Time a Day, If NO BM by AM Day # 4, Day Nurse Gives a Suppository that AM   HumaLOG KwikPen Subcutaneous Solution Pen-injector 100 UNIT/ML (Insulin Lispro) Inject 5 unit subcutaneously with meals for DM 2 related to TYPE 2 DIABETES MELLITUS WITH HYPERGLYCEMIA (E11.65)   Glucophage Oral Tablet 500 MG (Metformin HCl) Give 2 tablet by mouth two times a day for DM 2 related to TYPE 2 DIABETES MELLITUS WITH HYPERGLYCEMIA (E11.65) 2 tabs = 1000mg BID w/ meals   Advair Diskus Inhalation Aerosol Powder Breath Activated 250-50 MCG/ACT (Fluticasone-Salmeterol) 1 puff inhale orally two times a day for COPD related to CHRONIC OBSTRUCTIVE PULMONARY DISEASE, UNSPECIFIED (J44.9) rinse mouth after use   predniSONE Oral Tablet 1 MG (Prednisone) Give 4 tablet by mouth one time a day for polymyalgia rheumatica related to POLYMYALGIA RHEUMATICA (M35.3) 4 tabs = 4mg QD   Biofreeze External Gel 4 % (Menthol (Topical Analgesic)) Apply to B/L knees topically every 6 hours as needed for pain/discomfort related to PAIN, UNSPECIFIED (R52)   ProSource No Carb Oral Liquid (Nutritional Supplements) Give 30 ml by mouth two times a day for Nutritional Supplement   Collagenase Ointment 250 UNIT/GM Apply to R distal plantar foot topically every day shift for diabetic ulcer Cleanse (R) distal plantar foot diabetic ulcer with NSS, apply shin thick santyl to wound base, cover with ABD and secure with tita daily. Collagenase Ointment 250 UNIT/GM Apply to R proximal plantar foot topically every day shift for diabetic ulcer Cleanse (R) proximal plantar foot diabetic ulcer with NSS, apply shin thick santyl to wound base, cover with ABD and secure with tita   Collagenase Ointment 250 UNIT/GM Apply to L plantar foot ulcer topically every day shift for diabetic ulcer Cleanse (L) plantar foot diabetic ulcer with NSS, apply shin thick Santyl to wound base, cover with ABD and secure with tita.    Lantus SoloStar Subcutaneous Solution Pen-injector 100 UNIT/ML (Insulin Glargine) Inject 15 unit subcutaneously every 12 hours related to TYPE 2 DIABETES MELLITUS WITH HYPERGLYCEMIA (E11.65) Hold if BS <100   Ventolin HFA Inhalation Aerosol Solution 108 (90 Base) MCG/ACT (Albuterol Sulfate) 2 puff inhale orally every 6 hours as needed for wheezing   Loratadine Tablet 10 MG Give 1 tablet by mouth one time a day related to COUGH, UNSPECIFIED (R05.9)   Siltussin JAIN Liquid 100 MG/5ML (GuaiFENesin) Give 10 ml by mouth every 6 hours as needed for cough   Ventolin HFA Inhalation Aerosol Solution 108 (90 Base) MCG/ACT (Albuterol Sulfate) 2 puff inhale orally every 6 hours for wheezing for 7 Days       Maryan Womack MD  Internal Medicine  Senior Care Physician

## 2023-12-04 ENCOUNTER — OFFICE VISIT (OUTPATIENT)
Dept: WOUND CARE | Facility: CLINIC | Age: 63
End: 2023-12-04
Payer: COMMERCIAL

## 2023-12-04 VITALS
RESPIRATION RATE: 18 BRPM | HEART RATE: 72 BPM | SYSTOLIC BLOOD PRESSURE: 140 MMHG | TEMPERATURE: 97.4 F | DIASTOLIC BLOOD PRESSURE: 78 MMHG

## 2023-12-04 DIAGNOSIS — Z79.4 TYPE 2 DIABETES MELLITUS WITH DIABETIC NEUROPATHY, WITH LONG-TERM CURRENT USE OF INSULIN (HCC): ICD-10-CM

## 2023-12-04 DIAGNOSIS — L97.512 ULCER OF RIGHT FOOT, WITH FAT LAYER EXPOSED (HCC): Primary | ICD-10-CM

## 2023-12-04 DIAGNOSIS — L97.522 ULCER OF LEFT FOOT, WITH FAT LAYER EXPOSED (HCC): ICD-10-CM

## 2023-12-04 DIAGNOSIS — L97.412 ULCER OF RIGHT MIDFOOT WITH FAT LAYER EXPOSED (HCC): ICD-10-CM

## 2023-12-04 DIAGNOSIS — E11.40 TYPE 2 DIABETES MELLITUS WITH DIABETIC NEUROPATHY, WITH LONG-TERM CURRENT USE OF INSULIN (HCC): ICD-10-CM

## 2023-12-04 PROCEDURE — 99213 OFFICE O/P EST LOW 20 MIN: CPT | Performed by: PODIATRIST

## 2023-12-04 PROCEDURE — 11042 DBRDMT SUBQ TIS 1ST 20SQCM/<: CPT | Performed by: PODIATRIST

## 2023-12-04 RX ORDER — GUAIFENESIN 200 MG/10ML
200 LIQUID ORAL 4 TIMES DAILY PRN
COMMUNITY

## 2023-12-04 RX ORDER — LORATADINE 10 MG/1
10 TABLET ORAL DAILY
COMMUNITY

## 2023-12-04 RX ORDER — ALBUTEROL SULFATE 90 UG/1
2 AEROSOL, METERED RESPIRATORY (INHALATION) EVERY 6 HOURS PRN
COMMUNITY

## 2023-12-04 NOTE — PATIENT INSTRUCTIONS
Orders Placed This Encounter   Procedures    Wound cleansing and dressings     Bilateral foot wounds     May shower with protection only. Keep dressings clean,dry, and intact      Cleanse wounds with normal saline. Pat dry  Apply nickel thick santyl to all wounds. (one time application of silver gel used at wound care today)  DO NOT USE A FOAM DRESSING AS IT PULLS THE SANTYL AWAY FROM THE WOUND BED. Cover with ABD  Secure with tita wrap and tape  Change dressing daily         Patient is to be non-weight bearing at this time.  Patient may heel touch to Left foot for transfers and Physical Therapy      Patient to have 3-4 servings of protein daily     Standing Status:   Future     Standing Expiration Date:   12/4/2024

## 2023-12-16 NOTE — ASSESSMENT & PLAN NOTE
Diagnosed in September 2019 on CTA completed for persistent vertigo    Imaging:  · CT head and neck with without September 2019:  Dilatation centered in the lateral aspect the mid to distal cavernous segment of the left ICA  · MRI head without September 2019:  Two small left cavernous carotid segment aneurysms proximal during 6 x 2 6 mm, distal 4 x 3 mm  No intradural aneurysm  Right A1 segment markedly hypoplastic  · CT head and neck with without 2/10/2020:  Laterally projecting extra 3 mm left cavernous ICA aneurysm  Left cavernous cave aneurysm measuring 3 mm obstruction of the cavernous and supraclinoid ICA  No acute intracranial hemorrhage    Plan:  · Monitor neurological exam   · Discussed natural history of aneurysm  · Discussed modifiable risk factors of growth and rupture to include control of hypertension, cholesterol and refrain from smoking  Patient is a non smoker  · Denies any family history of aneurysm or sudden death of unknown cause  · No urgent intervention  Will plan for outpatient follow-up  Ambulatory

## 2023-12-18 ENCOUNTER — OFFICE VISIT (OUTPATIENT)
Dept: WOUND CARE | Facility: CLINIC | Age: 63
End: 2023-12-18
Payer: COMMERCIAL

## 2023-12-18 VITALS
HEART RATE: 74 BPM | DIASTOLIC BLOOD PRESSURE: 82 MMHG | SYSTOLIC BLOOD PRESSURE: 160 MMHG | TEMPERATURE: 98.2 F | RESPIRATION RATE: 18 BRPM

## 2023-12-18 DIAGNOSIS — E11.40 TYPE 2 DIABETES MELLITUS WITH DIABETIC NEUROPATHY, WITH LONG-TERM CURRENT USE OF INSULIN (HCC): ICD-10-CM

## 2023-12-18 DIAGNOSIS — L97.412 ULCER OF RIGHT MIDFOOT WITH FAT LAYER EXPOSED (HCC): ICD-10-CM

## 2023-12-18 DIAGNOSIS — L97.512 ULCER OF RIGHT FOOT, WITH FAT LAYER EXPOSED (HCC): Primary | ICD-10-CM

## 2023-12-18 DIAGNOSIS — L97.522 ULCER OF LEFT FOOT, WITH FAT LAYER EXPOSED (HCC): ICD-10-CM

## 2023-12-18 DIAGNOSIS — Z79.4 TYPE 2 DIABETES MELLITUS WITH DIABETIC NEUROPATHY, WITH LONG-TERM CURRENT USE OF INSULIN (HCC): ICD-10-CM

## 2023-12-18 PROCEDURE — 11042 DBRDMT SUBQ TIS 1ST 20SQCM/<: CPT | Performed by: PODIATRIST

## 2023-12-18 PROCEDURE — 17250 CHEM CAUT OF GRANLTJ TISSUE: CPT | Performed by: PODIATRIST

## 2023-12-18 RX ORDER — LIDOCAINE 40 MG/G
CREAM TOPICAL ONCE
Status: COMPLETED | OUTPATIENT
Start: 2023-12-18 | End: 2023-12-18

## 2023-12-18 RX ADMIN — LIDOCAINE: 40 CREAM TOPICAL at 11:10

## 2023-12-18 NOTE — PATIENT INSTRUCTIONS
Orders Placed This Encounter   Procedures    Debridement Diabetic Ulcer Right;Plantar;Proximal Foot     This order was created via procedure documentation    Chemical caut of a wound Diabetic Ulcer Left;Plantar Foot     This order was created via procedure documentation    Wound cleansing and dressings     Wound cleansing and dressings   Bilateral foot wounds     May shower with protection only. Keep dressings clean,dry, and intact   R plantar metatarsal head wound  Cleanse with normal saline  Apply dry dressing  Change every 2 days    R and left lateral plantar midfoot wounds   Cleanse wounds with normal saline. Pat dry  HOLD SANTYL FOR 1 WEEK and redress as follows:  Thin layer Zinc oxide to periwound skin  Calcium alginate to wound beds  Cover with 1/2 -  ABD  Secure with tita and tape  Change every 2 days  Spandagrip size g to bilateral LE from base of toes to knee    BEGINNING 12/25:  Stop calcium alginate  Thin layer Zinc oxide to the periwound skin  Resume nickel thick santyl to all wounds.   Cover with 1/2 - 1 ABD  Secure with tita wrap and tape  Change dressing daily until next visit     Continue non-weight bearing to both feet   Patient may heel touch to Left foot for transfers and Physical Therapy      Elevate both lower legs above heart level as much as you can as high as you can when sitting and during the night    Increase proteins to 3-4 servings of protein daily    Follow up in 3 weeks    Today's wound treatment note:  All wounds cleansed with normal saline  Redressed as ordered above     Standing Status:   Future     Standing Expiration Date:   12/18/2024

## 2023-12-18 NOTE — PROGRESS NOTES
Patient ID: Edie Salazar is a 63 y.o. female Date of Birth 1960       Chief Complaint   Patient presents with    Follow Up Wound Care Visit     Bilateral plantar foot wounds. Distal R wound is covered with dry epithelium today. Patient arrived with surgical shoes intact on both feet       Allergies:  Patient has no known allergies.    Assessments:      Diagnosis ICD-10-CM Associated Orders   1. Ulcer of right foot, with fat layer exposed ([facility])  L97.512 lidocaine (LMX) 4 % cream     Wound cleansing and dressings      2. Ulcer of right midfoot with fat layer exposed ([facility])  L97.412 lidocaine (LMX) 4 % cream     Debridement Diabetic Ulcer Right;Plantar;Proximal Foot     Wound cleansing and dressings      3. Ulcer of left foot, with fat layer exposed ([facility])  L97.522 lidocaine (LMX) 4 % cream     Chemical caut of a wound Diabetic Ulcer Left;Plantar Foot     Wound cleansing and dressings      4. Type 2 diabetes mellitus with diabetic neuropathy, with long-term current use of insulin ([facility])  E11.40 Debridement Diabetic Ulcer Right;Plantar;Proximal Foot    Z79.4 Chemical caut of a wound Diabetic Ulcer Left;Plantar Foot               Plan:  1. Reviewed medical records.  Patient was counseled and educated on the condition and the diagnosis.    2. The diagnosis, treatment options and prognosis were discussed with the patient.    3. Wounds are healing.  Continue serial debridement and Santyl.  4. Stressed on patient compliance about proper off-loading, and staying off of foot.  Recommended elevation of legs to reduce LE edema.  Tubigrip for compression on her legs.    5. Patient will return in 3 weeks for re-evaluation.      Imaging: I have personally reviewed pertinent films in PACS  Labs, pathology, and Other Studies: I have personally reviewed pertinent reports.      Subjective:     HPI  The patient presents for wound care in both feet.  No pain.  No new complaint.  BS under control.        The following portions of the  patient's history were reviewed and updated as appropriate:   Patient Active Problem List   Diagnosis    Uncontrolled type 2 diabetes mellitus with hyperglycemia (HCC)    Seizures (HCC)    Exertional dyspnea    Enlarged pulmonary artery (HCC)    Aortic dilatation (HCC)    Anemia    Decreased pulses in feet    Hyperlipidemia    Microalbuminuria    Neuropathy of hand, right    QT prolongation    Visual impairment in both eyes    Vitamin D deficiency    Lung nodules    Orthostatic hypotension    Mild intermittent asthma without complication    Type 2 diabetes mellitus with microalbuminuria, with long-term current use of insulin (HCC)    Other inflammatory and immune myopathies, not elsewhere classified    Morbid obesity (HCC)    Polyneuropathy associated with underlying disease (HCC)    PMR (polymyalgia rheumatica) (HCC)    Thrombocytosis    Left cavernous carotid aneurysm    Type 2 diabetes mellitus with hyperglycemia, with long-term current use of insulin (HCC)    Aneurysm (HCC)    Thyroid nodule    History of absence seizures    Hypersomnia    Cerebral aneurysm without rupture    Chronic migraine without aura without status migrainosus, not intractable    Hypertension    Depressed mood    Blurry vision, bilateral    Ulnar neuropathy of right upper extremity    Polymyalgia rheumatica (HCC)    Gait instability    Stage 3a chronic kidney disease (HCC)    Obstructive sleep apnea    Diabetic neuropathy (HCC)    Unsteadiness on feet    Abnormal urinalysis    Weakness    Intertrigo    Leg numbness    Unsheltered homelessness    Bilateral lower extremity edema    Diarrhea    Dyslipidemia    Insulin long-term use (HCC)    Type 2 diabetes mellitus with hyperglycemia (HCC)    Ambulatory dysfunction    Legally blind    Suspected pressure injury of deep tissue    Calculus of gallbladder without cholecystitis without obstruction    Diabetes mellitus (HCC)    Abnormal CT scan    Friction blisters of the soles    Leukocytosis     Encounter for support and coordination of transition of care    Food insecurity    Homelessness    Ulcer of left heel (HCC)    Pulmonary embolism (HCC)    Diabetic foot ulcer (HCC)    Cellulitis of lower extremity    Chronic anemia    Viral upper respiratory illness     Past Medical History:   Diagnosis Date    Anemia     Benign hypertension     Procedure/Onset: 01/01/2005; Description: BP elevated today. Pt reports running out of BP meds 2wks ago. Will resume BP meds.    Diabetes mellitus (HCC)     Diabetes mellitus type 2 with complications, uncontrolled 9/8/2015    Dyspnea on exertion     Hyperlipidemia     Hypertension     Legally blind 2010    Miscarriage     x 3    Polymyalgia rheumatica (HCC) 11/24/2021    Seizures (HCC)     Sickle cell trait (HCC)     Stage 3a chronic kidney disease (HCC) 5/19/2022    Thyroid nodule 3/6/2020     Past Surgical History:   Procedure Laterality Date    CATARACT EXTRACTION Bilateral     august and september    EYE SURGERY      HYSTERECTOMY      37    OOPHORECTOMY Left     37    VA LIGATION/BIOPSY TEMPORAL ARTERY Bilateral 10/17/2019    Procedure: BIOPSY ARTERY TEMPORAL;  Surgeon: Bentley Reyes DO;  Location: [facility];  Service: Vascular    US GUIDED THYROID BIOPSY  5/13/2020     Family History   Problem Relation Age of Onset    Heart attack Mother     Heart disease Mother     Hypertension Mother     No Known Problems Father     Heart disease Sister     No Known Problems Sister     No Known Problems Sister     No Known Problems Daughter     Heart attack Maternal Grandmother     Heart disease Maternal Grandmother     No Known Problems Brother     No Known Problems Son     No Known Problems Son     No Known Problems Paternal Aunt     Diabetes Other     Heart attack Other     Cancer Neg Hx     Stroke Neg Hx       Social History     Socioeconomic History    Marital status: /Civil Union     Spouse name: None    Number of children: None    Years of education: None     Highest education level: None   Occupational History    Occupation: long term disability   Tobacco Use    Smoking status: Never    Smokeless tobacco: Never   Vaping Use    Vaping status: Never Used   Substance and Sexual Activity    Alcohol use: Never     Alcohol/week: 0.0 standard drinks of alcohol    Drug use: No    Sexual activity: Not Currently     Partners: Male     Birth control/protection: None   Other Topics Concern    None   Social History Narrative    Caffeine use    Resides with spouse and one son     Social Determinants of Health     Financial Resource Strain: High Risk (8/25/2023)    Received from [facility]    Overall Financial Resource Strain (CARDIA)     Difficulty of Paying Living Expenses: Very hard   Food Insecurity: Food Insecurity Present (9/26/2023)    Hunger Vital Sign     Worried About Running Out of Food in the Last Year: Often true     Ran Out of Food in the Last Year: Often true   Transportation Needs: Unmet Transportation Needs (9/26/2023)    PRAPARE - Transportation     Lack of Transportation (Medical): Yes     Lack of Transportation (Non-Medical): Yes   Physical Activity: Inactive (2/3/2020)    Exercise Vital Sign     Days of Exercise per Week: 0 days     Minutes of Exercise per Session: 0 min   Stress: No Stress Concern Present (2/3/2020)    [employer] [place] - Occupational Stress Questionnaire     Feeling of Stress : Only a little   Social Connections: Unknown (2/3/2020)    Social Connection and Isolation Panel [NHANES]     Frequency of Communication with Friends and Family: Twice a week     Frequency of Social Gatherings with Friends and Family: Twice a week     Attends Sikhism Services: Not on file     Active Member of Clubs or Organizations: Not on file     Attends Club or Organization Meetings: Not on file     Marital Status:    Intimate Partner Violence: Not At Risk (8/25/2023)    Received from [facility]     Humiliation, Afraid, Rape, and Kick questionnaire     Fear of Current or Ex-Partner: No     Emotionally Abused: No     Physically Abused: No     Sexually Abused: No   Housing Stability: High Risk (9/26/2023)    Housing Stability Vital Sign     Unable to Pay for Housing in the Last Year: Yes     Number of Places Lived in the Last Year: 3     Unstable Housing in the Last Year: Yes        Current Outpatient Medications:     acetaminophen (TYLENOL) 325 mg tablet, Take 650 mg by mouth every 6 (six) hours as needed for mild pain, Disp: , Rfl:     albuterol (PROVENTIL HFA,VENTOLIN HFA) 90 mcg/act inhaler, Inhale 2 puffs every 6 (six) hours as needed for wheezing, Disp: , Rfl:     amLODIPine (NORVASC) 5 mg tablet, Take 1 tablet (5 mg total) by mouth daily, Disp: 30 tablet, Rfl: 3    apixaban (ELIQUIS) 5 mg, Take 1 tablet (5 mg total) by mouth 2 (two) times a day Do not start before August 17, 2023., Disp: , Rfl: 0    atorvastatin (LIPITOR) 40 mg tablet, Take 1 tablet (40 mg total) by mouth daily, Disp: 90 tablet, Rfl: 3    Blood Glucose Monitoring Suppl (OneTouch Verio Reflect) w/Device KIT, Use 1 each 4 (four) times a day (Patient not taking: Reported on 9/11/2023), Disp: 1 kit, Rfl: 0    Blood Pressure Monitor KIT, Check blood pressures BID (Patient not taking: Reported on 9/11/2023), Disp: 1 kit, Rfl: 0    cholecalciferol (VITAMIN D3) 1,000 units tablet, Take 2 tablets (2,000 Units total) by mouth daily, Disp: 60 tablet, Rfl: 2    collagenase (SANTYL) ointment, Apply 1 Application topically daily Apply to right distal plantar foot diabetic ulcer, apply to right proximal plantar foot diabetic ulcer, apply to left plantar foot diabetic ulcer, Disp: , Rfl:     Continuous Blood Gluc  (FreeStyle Denisa 2 [place]) ERIC, Use 1 each 4 (four) times a day (Patient not taking: Reported on 9/11/2023), Disp: 1 each, Rfl: 0    Continuous Blood Gluc Sensor (FreeStyle Denisa 2 Sensor) MISC, Use 1 each every 14 (fourteen) days  (Patient not taking: Reported on 9/11/2023), Disp: 2 each, Rfl: 0    docusate sodium (COLACE) 100 mg capsule, Take 1 capsule (100 mg total) by mouth 2 (two) times a day, Disp: 60 capsule, Rfl: 0    Fluticasone-Salmeterol (Advair) 250-50 mcg/dose inhaler, Inhale 1 puff 2 (two) times a day Rinse mouth after use., Disp: , Rfl:     gabapentin (NEURONTIN) 100 mg capsule, Take 1 capsule (100 mg total) by mouth 2 (two) times a day, Disp: 60 capsule, Rfl: 3    guaiFENesin (ROBITUSSIN) 100 MG/5ML oral liquid, Take 200 mg by mouth 4 (four) times a day as needed for cough, Disp: , Rfl:     hydrALAZINE (APRESOLINE) 10 mg tablet, Take 1 tablet (10 mg total) by mouth 3 (three) times a day, Disp: 90 tablet, Rfl: 3    insulin glargine (LANTUS) 100 units/mL subcutaneous injection, Inject 18 Units under the skin every 12 (twelve) hours (Patient taking differently: Inject 15 Units under the skin every 12 (twelve) hours), Disp: 10 mL, Rfl: 0    insulin lispro (HumaLOG KwikPen) 100 units/mL injection pen, Inject 5 Units under the skin 3 (three) times a day with meals, Disp: , Rfl:     Insulin Pen Needle (BD Pen Needle Tita 2nd Gen) 32G X 4 MM MISC, For use with insulin pen. Pharmacy may dispense brand covered by insurance. (Patient not taking: Reported on 9/11/2023), Disp: 100 each, Rfl: 0    Insulin Pen Needle (BD Pen Needle Tita U/F) 32G X 4 MM MISC, Use four times daily as directed with insulin pen (Patient not taking: Reported on 9/11/2023), Disp: 200 each, Rfl: 3    Lancets (OneTouch Delica Plus [id number]) MISC, Use 1 each 4 (four) times a day (Patient not taking: Reported on 9/11/2023), Disp: 200 each, Rfl: 2    lidocaine (LIDODERM) 5 %, Apply 1 patch topically over 12 hours daily Remove & Discard patch within 12 hours or as directed by MD Do not start before October 1, 2023., Disp: , Rfl: 0    loratadine (CLARITIN) 10 mg tablet, Take 10 mg by mouth daily, Disp: , Rfl:     losartan (COZAAR) 100 MG tablet, Take 1 tablet (100 mg  total) by mouth daily, Disp: 30 tablet, Rfl: 5    Menthol, Topical Analgesic, (Biofreeze) 4 % GEL, Apply 1 Application topically 2 (two) times a day, Disp: , Rfl:     metFORMIN (GLUCOPHAGE) 1000 MG tablet, Take 1 tablet (1,000 mg total) by mouth 2 (two) times a day with meals, Disp: 60 tablet, Rfl: 3    metoprolol tartrate (LOPRESSOR) 100 mg tablet, Take 1 tablet (100 mg total) by mouth every 12 (twelve) hours, Disp: 60 tablet, Rfl: 3    multivitamin-iron-minerals-folic acid (THERAPEUTIC-M) TABS tablet, Take 1 tablet by mouth daily, Disp: 30 tablet, Rfl: 2    nystatin (MYCOSTATIN) powder, Apply topically 2 (two) times a day, Disp: , Rfl: 0    predniSONE 1 mg tablet, Take 4 tablets (4 mg total) by mouth daily, Disp: 120 tablet, Rfl: 0    topiramate (TOPAMAX) 100 mg tablet, Take 1 tablet (100 mg total) by mouth daily at bedtime (Patient taking differently: Take 100 mg by mouth daily at bedtime), Disp: 30 tablet, Rfl: 0  No current facility-administered medications for this visit.    Review of Systems   Constitutional:  Negative for chills and fever.   Respiratory:  Negative for cough and shortness of breath.    Cardiovascular:  Negative for chest pain.   Gastrointestinal:  Negative for nausea and vomiting.   Skin:  Positive for wound.   Neurological:  Positive for numbness. Negative for weakness.       Objective:  /82   Pulse 74   Temp 98.2 °F (36.8 °C)   Resp 18   Pain Score: 0-No pain     Physical Exam  Vitals and nursing note reviewed.   Constitutional:       General: She is not in acute distress.     Appearance: She is obese. She is not toxic-appearing.   Cardiovascular:      Rate and Rhythm: Normal rate and regular rhythm.      Pulses: Decreased pulses.           Dorsalis pedis pulses are detected w/ Doppler on the right side and detected w/ Doppler on the left side.        Posterior tibial pulses are detected w/ Doppler on the right side and detected w/ Doppler on the left side.      Comments: Biphasic  signals bilateral DP and PTA.  Pulmonary:      Effort: Pulmonary effort is normal. No respiratory distress.   Musculoskeletal:         General: No tenderness or signs of injury.      Right lower leg: Edema present.      Left lower leg: Edema present.   Skin:     General: Skin is warm.      Capillary Refill: Capillary refill takes less than 2 seconds.      Coloration: Skin is not cyanotic or mottled.      Findings: Wound present. No abscess.      Nails: There is no clubbing.      Comments: Full thickness ulcers in right submet 5 head, submet 5 base, and left submet 5.  Increased granular tissues.  Hypergranular wound bed left foot ulcer.  Decrease in size and depth.  Wounds do not probe to bone.  No signs of active infection.  See wound assessment and photo.   Neurological:      General: No focal deficit present.      Mental Status: She is alert and oriented to person, place, and time.      Cranial Nerves: No cranial nerve deficit.      Sensory: Sensory deficit present.      Coordination: Coordination normal.   Psychiatric:         Mood and Affect: Mood normal.         Behavior: Behavior normal.         Thought Content: Thought content normal.         Judgment: Judgment normal.         Wound 09/11/23 Diabetic Ulcer Foot Left;Plantar (Active)   Enter Mcgee score: Mcgee Grade 1: Partial or full-thickness ulcer (superficial) 12/18/23 1038   Wound Image Images linked 12/18/23 1031   Wound Description Yellow;Pink;Hypergranulation 12/18/23 1038   Soraya-wound Assessment Maceration 12/18/23 1038   Wound Length (cm) 1.6 cm 12/18/23 1038   Wound Width (cm) 1 cm 12/18/23 1038   Wound Depth (cm) 0.2 cm 12/18/23 1038   Wound Surface Area (cm^2) 1.6 cm^2 12/18/23 1038   Wound Volume (cm^3) 0.32 cm^3 12/18/23 1038   Calculated Wound Volume (cm^3) 0.32 cm^3 12/18/23 1038   Drainage Amount Moderate 12/18/23 1038   Drainage Description Serosanguineous;Tan 12/18/23 1038       Wound 09/11/23 Diabetic Ulcer Foot Right;Plantar;Distal  "(Active)   Enter Mcgee score: Mcgee Grade 1: Partial or full-thickness ulcer (superficial) 12/18/23 1039   Wound Image Images linked 12/18/23 1051   Wound Description Epithelialization 12/18/23 1039   Soraya-wound Assessment Dry 12/18/23 1039   Wound Length (cm) 0.1 cm 12/18/23 1039   Wound Width (cm) 0.1 cm 12/18/23 1039   Wound Depth (cm) 0.1 cm 12/18/23 1039   Wound Surface Area (cm^2) 0.01 cm^2 12/18/23 1039   Wound Volume (cm^3) 0.001 cm^3 12/18/23 1039   Calculated Wound Volume (cm^3) 0 cm^3 12/18/23 1039   Change in Wound Size % 100 12/18/23 1039   Drainage Amount Scant 12/18/23 1039   Drainage Description Ruiz 12/18/23 1039       Wound 09/11/23 Diabetic Ulcer Foot Right;Plantar;Proximal (Active)   Enter Mcgee score: Mcgee Grade 1: Partial or full-thickness ulcer (superficial) 12/18/23 1039   Wound Image Images linked 12/18/23 1052   Wound Description Hypergranulation;Slough;Yellow 12/18/23 1039   Soraya-wound Assessment Maceration 12/18/23 1039   Wound Length (cm) 1.6 cm 12/18/23 1039   Wound Width (cm) 0.1 cm 12/18/23 1039   Wound Depth (cm) 0.2 cm 12/18/23 1039   Wound Surface Area (cm^2) 0.16 cm^2 12/18/23 1039   Wound Volume (cm^3) 0.032 cm^3 12/18/23 1039   Calculated Wound Volume (cm^3) 0.03 cm^3 12/18/23 1039   Drainage Amount Moderate 12/18/23 1039   Drainage Description Serosanguineous;Ruiz 12/18/23 1039       Debridement   Wound 09/11/23 Diabetic Ulcer Foot Right;Plantar;Proximal    Universal Protocol:  Consent: Verbal consent obtained.  Risks and benefits: risks, benefits and alternatives were discussed  Consent given by: patient  Time out: Immediately prior to procedure a \"time out\" was called to verify the correct patient, procedure, equipment, support staff and site/side marked as required.  Timeout called at: 12/18/2023 11:11 AM.  Patient understanding: patient states understanding of the procedure being performed  Patient identity confirmed: verbally with patient    Debridement " Details  Performed by: physician  Debridement type: surgical  Level of debridement: subcutaneous tissue  Pain control: lidocaine 4%      Post-debridement measurements  Length (cm): 1.8  Width (cm): 0.8  Depth (cm): 0.2  Percent debrided: 100%  Surface Area (cm^2): 1.44  Area Debrided (cm^2): 1.44  Volume (cm^3): 0.29    Tissue and other material debrided: dermis, epidermis and subcutaneous tissue  Devitalized tissue debrided: fibrin and slough  Instrument(s) utilized: blade  Bleeding: small  Hemostasis obtained with: pressure  Procedural pain (0-10): 0  Post-procedural pain: 0   Response to treatment: procedure was tolerated well        Chemical caut of a wound Diabetic Ulcer Left;Plantar Foot     Date/Time  12/18/2023 10:30 AM     Performed by  Natanael Carr DPM   Authorized by  Natanael Carr DPM      Associated wounds:   Wound 09/11/23 Diabetic Ulcer Foot Left;Plantar   Universal Protocol   Consent: Verbal consent obtained.  Risks and benefits: risks, benefits and alternatives were discussed  Consent given by: patient  Timeout called at: 12/18/2023 11:12 AM.  Patient understanding: patient states understanding of the procedure being performed  Patient identity confirmed: verbally with patient     Procedure Details   Procedure Notes: Hypergranular tissues of left foot wound was cauterized with silver nitrate X 1.    Patient tolerance: patient tolerated the procedure well with no immediate complications              Results from last 6 Months   Lab Units 08/09/23  0945   WOUND CULTURE  4+ Growth of       Lab Results   Component Value Date    HGBA1C 10.2 (H) 08/24/2023       Wound Instructions:  Orders Placed This Encounter   Procedures    Debridement Diabetic Ulcer Right;Plantar;Proximal Foot     This order was created via procedure documentation    Chemical caut of a wound Diabetic Ulcer Left;Plantar Foot     This order was created via procedure documentation    Wound cleansing and dressings     Wound cleansing and  dressings   Bilateral foot wounds     May shower with protection only. Keep dressings clean,dry, and intact   R plantar metatarsal head wound  Cleanse with normal saline  Apply dry dressing  Change every 2 days    R and left lateral plantar midfoot wounds   Cleanse wounds with normal saline. Pat dry  HOLD SANTYL FOR 1 WEEK and redress as follows:  Thin layer Zinc oxide to periwound skin  Calcium alginate to wound beds  Cover with 1/2 -  ABD  Secure with tita and tape  Change every 2 days  Spandagrip size g to bilateral LE from base of toes to knee    BEGINNING 12/25:  Stop calcium alginate  Thin layer Zinc oxide to the periwound skin  Resume nickel thick santyl to all wounds.   Cover with 1/2 - 1 ABD  Secure with tita wrap and tape  Change dressing daily until next visit     Continue non-weight bearing to both feet   Patient may heel touch to Left foot for transfers and Physical Therapy      Elevate both lower legs above heart level as much as you can as high as you can when sitting and during the night    Increase proteins to 3-4 servings of protein daily    Follow up in 3 weeks    Today's wound treatment note:  All wounds cleansed with normal saline  Redressed as ordered above     Standing Status:   Future     Standing Expiration Date:   12/18/2024             Natanael Carr DPM

## 2023-12-28 ENCOUNTER — NURSING HOME VISIT (OUTPATIENT)
Dept: GERIATRICS | Facility: OTHER | Age: 63
End: 2023-12-28

## 2023-12-28 DIAGNOSIS — R26.2 AMBULATORY DYSFUNCTION: ICD-10-CM

## 2023-12-28 DIAGNOSIS — Z79.4 TYPE 2 DIABETES MELLITUS WITH HYPERGLYCEMIA, WITH LONG-TERM CURRENT USE OF INSULIN (HCC): ICD-10-CM

## 2023-12-28 DIAGNOSIS — L97.502 DIABETIC ULCER OF FOOT ASSOCIATED WITH DIABETES MELLITUS OF OTHER TYPE, WITH FAT LAYER EXPOSED, UNSPECIFIED LATERALITY, UNSPECIFIED PART OF FOOT (HCC): Primary | ICD-10-CM

## 2023-12-28 DIAGNOSIS — E13.621 DIABETIC ULCER OF FOOT ASSOCIATED WITH DIABETES MELLITUS OF OTHER TYPE, WITH FAT LAYER EXPOSED, UNSPECIFIED LATERALITY, UNSPECIFIED PART OF FOOT (HCC): Primary | ICD-10-CM

## 2023-12-28 DIAGNOSIS — I10 PRIMARY HYPERTENSION: ICD-10-CM

## 2023-12-28 DIAGNOSIS — I26.99 ACUTE PULMONARY EMBOLISM, UNSPECIFIED PULMONARY EMBOLISM TYPE, UNSPECIFIED WHETHER ACUTE COR PULMONALE PRESENT (HCC): ICD-10-CM

## 2023-12-28 DIAGNOSIS — E11.65 TYPE 2 DIABETES MELLITUS WITH HYPERGLYCEMIA, WITH LONG-TERM CURRENT USE OF INSULIN (HCC): ICD-10-CM

## 2023-12-30 VITALS
RESPIRATION RATE: 18 BRPM | BODY MASS INDEX: 40.78 KG/M2 | SYSTOLIC BLOOD PRESSURE: 148 MMHG | TEMPERATURE: 98 F | DIASTOLIC BLOOD PRESSURE: 79 MMHG | HEART RATE: 67 BPM | WEIGHT: 268.2 LBS | OXYGEN SATURATION: 97 %

## 2023-12-30 NOTE — ASSESSMENT & PLAN NOTE
Currently NWB per podiatry  to allow wounds to heal  Continue PT/OT  Encourage use of assistive device  Continue to use wheelchair  Goal to use walker, states she uses the walker for only a short period of time to help her get clothes out of her closet  Continue fall precautions  Encourage patient to participate with activities  Provide education for patient, family, and caregivers

## 2023-12-30 NOTE — PROGRESS NOTES
[address], [address], [place] [address]  [phone]    NAME: Edie Salazar  AGE: 63 y.o. SEX: female    Progress Note    Location: [facility]   POS: 32 ([facility])  Code Status: Full Code    Chief complaint / Reason for visit:  Follow-up visit    Assessment/Plan:    Ambulatory dysfunction  Currently NWB per podiatry  to allow wounds to heal  Continue PT/OT  Encourage use of assistive device  Continue to use wheelchair  Goal to use walker, states she uses the walker for only a short period of time to help her get clothes out of her closet  Continue fall precautions  Encourage patient to participate with activities  Provide education for patient, family, and caregivers    Hypertension  Encourage medication adherence   Continue daily blood pressure checks  Encourage heart healthy diet  Continue hydralazine 10 mg po TID  Continue losartan 100 mg po daily  Continue amlodipine 5 mg po daily  Continue metoprolol tartrate 100 mg Q12 hours  Blood pressures stable    Pulmonary embolism (HCC)  History of PE and LE DVT  Started Eliquis treatment on 08/10/23  Continue eliquis 5 mg po BID    Type 2 diabetes mellitus with hyperglycemia, with long-term current use of insulin (HCC)  Recent HgA1c on 12/28/23 noted to be 7.0  More controlled   Encourage CCD  Avoid hypoglycemia  Hypoglycemia protocol  Continue metformin 1000 mg po BID with meals  Continue insulin glargine 15 units SQ Q12 hours  Continue insulin lispro 5 units SQ TID with meals    Diabetic foot ulcer (HCC)  Follows with [facility]  Recent appointment on 12/18  Evaluated every 3 weeks  NWB to allow healing of wounds  Surgical shoes in place  Continue wound care to bilateral lower extremities      This is a 63 y.o. female seen today at [facility].  Medical history includes, not limited to, type 2 DM, polymyalgia rheumatica, CKD stage 3A, hypertension, hyperlipidemia, and vitamin D  deficiency.    Resident is seen today for a routine follow up visit.  Upon entering resident's room she is sitting at the side of her bed getting ready for the morning.  She is alert and oriented x 3, able to answer all questions appropriately.  Currently denies pain.  States her wounds are progressing and healing well.  She is looking forward to when they are all healed, her mobility improves and she can move into an apartment.        Spoke with nursing staff regarding resident.  No concerns at this time.           Nursing and prior provider notes reviewed on this visit. Discussed visit with PCP and nursing staff/ supervisor.      Review of Systems   Constitutional:  Positive for activity change. Negative for appetite change, fatigue, fever and unexpected weight change.   HENT:  Negative for congestion.    Eyes:  Negative for pain, discharge and visual disturbance.        Eyeglasses   Respiratory:  Negative for cough and shortness of breath.    Cardiovascular:  Negative for chest pain and palpitations.   Gastrointestinal:  Negative for abdominal pain, constipation and diarrhea.   Genitourinary:  Negative for difficulty urinating, dysuria, hematuria and urgency.   Musculoskeletal:  Positive for arthralgias and gait problem.   Skin:  Positive for wound (bilateral feet, diabetic ulcers). Negative for color change.   Neurological:  Negative for syncope and weakness.   Psychiatric/Behavioral:  Negative for confusion and sleep disturbance.    All other systems reviewed and are negative.         ALLERGY: Reviewed, unchanged  No Known Allergies    HISTORY:  Medical Hx: Reviewed, unchanged  Family Hx: Reviewed, unchanged  Soc Hx: Reviewed,  unchanged      Physical Exam  Vitals and nursing note reviewed.   Constitutional:       General: She is not in acute distress.     Appearance: Normal appearance. She is not ill-appearing.   HENT:      Head: Normocephalic.      Right Ear: Tympanic membrane normal.      Left Ear: Tympanic  membrane normal.      Nose: Nose normal. No congestion.      Mouth/Throat:      Mouth: Mucous membranes are moist.      Pharynx: Oropharynx is clear.   Eyes:      General:         Right eye: No discharge.         Left eye: No discharge.      Extraocular Movements: Extraocular movements intact.      Conjunctiva/sclera: Conjunctivae normal.      Pupils: Pupils are equal, round, and reactive to light.   Cardiovascular:      Rate and Rhythm: Normal rate and regular rhythm.      Heart sounds: Normal heart sounds.      Comments: Decreased pedal pulses  Pulmonary:      Effort: Pulmonary effort is normal. No respiratory distress.      Breath sounds: Normal breath sounds.   Chest:      Chest wall: No tenderness.   Abdominal:      General: Bowel sounds are normal.      Palpations: Abdomen is soft.      Tenderness: There is no abdominal tenderness.   Musculoskeletal:         General: No swelling. Normal range of motion.      Cervical back: Normal range of motion.      Right lower leg: Edema present.      Left lower leg: Edema present.   Skin:     General: Skin is warm and dry.   Neurological:      Mental Status: She is alert and oriented to person, place, and time. Mental status is at baseline.      Motor: No weakness.   Psychiatric:         Mood and Affect: Mood normal.         Behavior: Behavior normal.         Thought Content: Thought content normal.         Judgment: Judgment normal.            Laboratory results / Imaging reviewed: Hard copy/ies in medical chart:    Vitals:    12/30/23 1318   BP: 148/79   Pulse: 67   Resp: 18   Temp: 98 °F (36.7 °C)   SpO2: 97%       Current Medications:  All medications reviewed and updated in Nursing Home Chart    Please note: This note was completed in part utilizing a voice-recognition software may have been used in the preparation of this document. Grammatical errors, random word insertion, spelling mistakes, and incomplete sentences may be an occasional consequence of the system  "secondary to software limitations, ambient noise and hardware issues. Occasional wrong word or \"sound-alike\" substitutions may have occurred due to the inherent limitations of voice recognition software. At the time of dictation, efforts were made to edit, clarify and/or correct errors. Interpretation should be guided by context. Please read the chart carefully and recognize, using context, where substitutions have occurred. If you have any questions or concerns about the context, text or information contained within the body of this dictation, please contact myself, the provider, for further clarification.      CONCETTA Jimenez  12/30/2023  "

## 2023-12-30 NOTE — ASSESSMENT & PLAN NOTE
Follows with [facility]  Recent appointment on 12/18  Evaluated every 3 weeks  NWB to allow healing of wounds  Surgical shoes in place  Continue wound care to bilateral lower extremities

## 2023-12-30 NOTE — ASSESSMENT & PLAN NOTE
Recent HgA1c on 12/28/23 noted to be 7.0  More controlled   Encourage CCD  Avoid hypoglycemia  Hypoglycemia protocol  Continue metformin 1000 mg po BID with meals  Continue insulin glargine 15 units SQ Q12 hours  Continue insulin lispro 5 units SQ TID with meals

## 2024-01-08 ENCOUNTER — OFFICE VISIT (OUTPATIENT)
Dept: WOUND CARE | Facility: CLINIC | Age: 64
End: 2024-01-08
Payer: COMMERCIAL

## 2024-01-08 VITALS — TEMPERATURE: 97.5 F | SYSTOLIC BLOOD PRESSURE: 170 MMHG | HEART RATE: 68 BPM | DIASTOLIC BLOOD PRESSURE: 80 MMHG

## 2024-01-08 DIAGNOSIS — L97.522 ULCER OF LEFT FOOT, WITH FAT LAYER EXPOSED (HCC): ICD-10-CM

## 2024-01-08 DIAGNOSIS — Z79.4 TYPE 2 DIABETES MELLITUS WITH DIABETIC NEUROPATHY, WITH LONG-TERM CURRENT USE OF INSULIN (HCC): ICD-10-CM

## 2024-01-08 DIAGNOSIS — E11.40 TYPE 2 DIABETES MELLITUS WITH DIABETIC NEUROPATHY, WITH LONG-TERM CURRENT USE OF INSULIN (HCC): ICD-10-CM

## 2024-01-08 DIAGNOSIS — L97.412 ULCER OF RIGHT MIDFOOT WITH FAT LAYER EXPOSED (HCC): ICD-10-CM

## 2024-01-08 DIAGNOSIS — L97.512 ULCER OF RIGHT FOOT, WITH FAT LAYER EXPOSED (HCC): Primary | ICD-10-CM

## 2024-01-08 PROCEDURE — 11042 DBRDMT SUBQ TIS 1ST 20SQCM/<: CPT | Performed by: PODIATRIST

## 2024-01-08 RX ORDER — LIDOCAINE 40 MG/G
CREAM TOPICAL ONCE
Status: COMPLETED | OUTPATIENT
Start: 2024-01-08 | End: 2024-01-08

## 2024-01-08 RX ADMIN — LIDOCAINE: 40 CREAM TOPICAL at 10:12

## 2024-01-08 NOTE — PATIENT INSTRUCTIONS
Orders Placed This Encounter   Procedures    Wound cleansing and dressings Diabetic Ulcer Right;Plantar;Proximal Foot     Debridement Diabetic Ulcer Right;Plantar;Proximal Foot       This order was created via procedure documentation  · Chemical caut of a wound Diabetic Ulcer Left;Plantar Foot      This order was created via procedure documentation  · Wound cleansing and dressings      Wound cleansing and dressings          Bilateral foot wounds     May shower with protection only. Keep dressings clean,dry, and intact      R and left lateral plantar midfoot wounds :    Cleanse wounds with normal saline. Pat dry  RESUME SANTYL  and redress as follows:  Thin layer Zinc oxide to periwound skin  Cover with 1/2 -  ABD  Secure with tita and tape  Change every 2 days  Spandagrip size g to bilateral LE from base of toes to knee     Thin layer Zinc oxide to the periwound skin  Resume nickel thick santyl to all wounds.   Cover with 1/2 - 1 ABD  Secure with tita wrap and tape  Change dressing daily until next visit     Continue non-weight bearing to both feet   Patient may heel touch to Left foot for transfers and Physical Therapy      Elevate both lower legs above heart level as much as you can as high as you can when sitting and during the night     Increase proteins to 3-4 servings of protein daily     Follow up in 2 weeks     Today's wound treatment note:  All wounds cleansed with normal saline  Purachol applied to wound bed today in wound center.   Redressed as ordered above     Standing Status:   Future     Standing Expiration Date:   1/8/2025

## 2024-01-08 NOTE — PROGRESS NOTES
Patient ID: Edie Salazar is a 63 y.o. female Date of Birth 1960       Chief Complaint   Patient presents with    Follow Up Wound Care Visit     Follow up of right foot wound.        Allergies:  Patient has no known allergies.    Assessments:      Diagnosis ICD-10-CM Associated Orders   1. Ulcer of right foot, with fat layer exposed ([facility])  L97.512 lidocaine (LMX) 4 % cream     Wound cleansing and dressings Diabetic Ulcer Right;Plantar;Proximal Foot      2. Ulcer of right midfoot with fat layer exposed ([facility])  L97.412 Debridement Diabetic Ulcer Right;Plantar;Proximal Foot      3. Ulcer of left foot, with fat layer exposed ([facility])  L97.522       4. Type 2 diabetes mellitus with diabetic neuropathy, with long-term current use of insulin ([facility])  E11.40 Debridement Diabetic Ulcer Right;Plantar;Proximal Foot    Z79.4                Plan:  1. Reviewed medical records.  Patient was counseled and educated on the condition and the diagnosis.    2. The diagnosis, treatment options and prognosis were discussed with the patient.    3. Wounds are healing well.  Continue serial debridement and Santyl.  4. Stressed on patient compliance about proper off-loading, and staying off of foot.  Recommended elevation of legs to reduce LE edema.  Tubigrip for compression on her legs.    5. Patient will return in 2 weeks for re-evaluation.      Imaging: I have personally reviewed pertinent films in PACS  Labs, pathology, and Other Studies: I have personally reviewed pertinent reports.      Subjective:     HPI  The patient presents for wound care in both feet.  No pain.  No new complaint.        The following portions of the patient's history were reviewed and updated as appropriate:   Patient Active Problem List   Diagnosis    Uncontrolled type 2 diabetes mellitus with hyperglycemia (HCC)    Seizures (HCC)    Exertional dyspnea    Enlarged pulmonary artery (HCC)    Aortic dilatation (HCC)    Anemia    Decreased pulses in feet     Hyperlipidemia    Microalbuminuria    Neuropathy of hand, right    QT prolongation    Visual impairment in both eyes    Vitamin D deficiency    Lung nodules    Orthostatic hypotension    Mild intermittent asthma without complication    Type 2 diabetes mellitus with microalbuminuria, with long-term current use of insulin (HCC)    Other inflammatory and immune myopathies, not elsewhere classified    Morbid obesity (HCC)    Polyneuropathy associated with underlying disease (HCC)    PMR (polymyalgia rheumatica) ([facility])    Thrombocytosis    Left cavernous carotid aneurysm    Type 2 diabetes mellitus with hyperglycemia, with long-term current use of insulin (HCC)    Aneurysm (HCC)    Thyroid nodule    History of absence seizures    Hypersomnia    Cerebral aneurysm without rupture    Chronic migraine without aura without status migrainosus, not intractable    Hypertension    Depressed mood    Blurry vision, bilateral    Ulnar neuropathy of right upper extremity    Polymyalgia rheumatica ([facility])    Gait instability    Stage 3a chronic kidney disease ([facility])    Obstructive sleep apnea    Diabetic neuropathy ([facility])    Unsteadiness on feet    Abnormal urinalysis    Weakness    Intertrigo    Leg numbness    Unsheltered homelessness    Bilateral lower extremity edema    Diarrhea    Dyslipidemia    Insulin long-term use (HCC)    Type 2 diabetes mellitus with hyperglycemia ([facility])    Ambulatory dysfunction    Legally blind    Suspected pressure injury of deep tissue    Calculus of gallbladder without cholecystitis without obstruction    Diabetes mellitus (HCC)    Abnormal CT scan    Friction blisters of the soles    Leukocytosis    Encounter for support and coordination of transition of care    Food insecurity    Homelessness    Ulcer of left heel (HCC)    Pulmonary embolism (HCC)    Diabetic foot ulcer (HCC)    Cellulitis of lower extremity    Chronic anemia    Viral upper respiratory illness     Past Medical History:   Diagnosis Date     Anemia     Benign hypertension     Procedure/Onset: 01/01/2005; Description: BP elevated today. Pt reports running out of BP meds 2wks ago. Will resume BP meds.    Diabetes mellitus (HCC)     Diabetes mellitus type 2 with complications, uncontrolled 9/8/2015    Dyspnea on exertion     Hyperlipidemia     Hypertension     Legally blind 2010    Miscarriage     x 3    Polymyalgia rheumatica (HCC) 11/24/2021    Seizures (HCC)     Sickle cell trait (HCC)     Stage 3a chronic kidney disease (HCC) 5/19/2022    Thyroid nodule 3/6/2020     Past Surgical History:   Procedure Laterality Date    CATARACT EXTRACTION Bilateral     august and september    EYE SURGERY      HYSTERECTOMY      37    OOPHORECTOMY Left     37    PA LIGATION/BIOPSY TEMPORAL ARTERY Bilateral 10/17/2019    Procedure: BIOPSY ARTERY TEMPORAL;  Surgeon: Bentley Reyes DO;  Location: [facility];  Service: Vascular    US GUIDED THYROID BIOPSY  5/13/2020     Family History   Problem Relation Age of Onset    Heart attack Mother     Heart disease Mother     Hypertension Mother     No Known Problems Father     Heart disease Sister     No Known Problems Sister     No Known Problems Sister     No Known Problems Daughter     Heart attack Maternal Grandmother     Heart disease Maternal Grandmother     No Known Problems Brother     No Known Problems Son     No Known Problems Son     No Known Problems Paternal Aunt     Diabetes Other     Heart attack Other     Cancer Neg Hx     Stroke Neg Hx       Social History     Socioeconomic History    Marital status: /Civil Union     Spouse name: None    Number of children: None    Years of education: None    Highest education level: None   Occupational History    Occupation: long term disability   Tobacco Use    Smoking status: Never    Smokeless tobacco: Never   Vaping Use    Vaping status: Never Used   Substance and Sexual Activity    Alcohol use: Never     Alcohol/week: 0.0 standard drinks of alcohol    Drug use: No     Sexual activity: Not Currently     Partners: Male     Birth control/protection: None   Other Topics Concern    None   Social History Narrative    Caffeine use    Resides with spouse and one son     Social Determinants of Health     Financial Resource Strain: High Risk (8/25/2023)    Received from [facility]    Overall Financial Resource Strain (CARDIA)     Difficulty of Paying Living Expenses: Very hard   Food Insecurity: Food Insecurity Present (9/26/2023)    Hunger Vital Sign     Worried About Running Out of Food in the Last Year: Often true     Ran Out of Food in the Last Year: Often true   Transportation Needs: Unmet Transportation Needs (9/26/2023)    PRAPARE - Transportation     Lack of Transportation (Medical): Yes     Lack of Transportation (Non-Medical): Yes   Physical Activity: Inactive (2/3/2020)    Exercise Vital Sign     Days of Exercise per Week: 0 days     Minutes of Exercise per Session: 0 min   Stress: No Stress Concern Present (2/3/2020)    [employer] - Occupational Stress Questionnaire     Feeling of Stress : Only a little   Social Connections: Unknown (2/3/2020)    Social Connection and Isolation Panel [NHANES]     Frequency of Communication with Friends and Family: Twice a week     Frequency of Social Gatherings with Friends and Family: Twice a week     Attends Taoism Services: Not on file     Active Member of Clubs or Organizations: Not on file     Attends Club or Organization Meetings: Not on file     Marital Status:    Intimate Partner Violence: Not At Risk (8/25/2023)    Received from [facility]    Humiliation, Afraid, Rape, and Kick questionnaire     Fear of Current or Ex-Partner: No     Emotionally Abused: No     Physically Abused: No     Sexually Abused: No   Housing Stability: High Risk (9/26/2023)    Housing Stability Vital Sign     Unable to Pay for Housing in the Last Year: Yes     Number of Places Lived in  the Last Year: 3     Unstable Housing in the Last Year: Yes        Current Outpatient Medications:     acetaminophen (TYLENOL) 325 mg tablet, Take 650 mg by mouth every 6 (six) hours as needed for mild pain, Disp: , Rfl:     albuterol (PROVENTIL HFA,VENTOLIN HFA) 90 mcg/act inhaler, Inhale 2 puffs every 6 (six) hours as needed for wheezing, Disp: , Rfl:     amLODIPine (NORVASC) 5 mg tablet, Take 1 tablet (5 mg total) by mouth daily, Disp: 30 tablet, Rfl: 3    apixaban (ELIQUIS) 5 mg, Take 1 tablet (5 mg total) by mouth 2 (two) times a day Do not start before August 17, 2023., Disp: , Rfl: 0    atorvastatin (LIPITOR) 40 mg tablet, Take 1 tablet (40 mg total) by mouth daily, Disp: 90 tablet, Rfl: 3    Blood Glucose Monitoring Suppl (OneTouch Verio Reflect) w/Device KIT, Use 1 each 4 (four) times a day (Patient not taking: Reported on 9/11/2023), Disp: 1 kit, Rfl: 0    Blood Pressure Monitor KIT, Check blood pressures BID (Patient not taking: Reported on 9/11/2023), Disp: 1 kit, Rfl: 0    cholecalciferol (VITAMIN D3) 1,000 units tablet, Take 2 tablets (2,000 Units total) by mouth daily, Disp: 60 tablet, Rfl: 2    collagenase (SANTYL) ointment, Apply 1 Application topically daily Apply to right distal plantar foot diabetic ulcer, apply to right proximal plantar foot diabetic ulcer, apply to left plantar foot diabetic ulcer, Disp: , Rfl:     Continuous Blood Gluc  (FreeStyle Denisa 2 [place]) ERIC, Use 1 each 4 (four) times a day (Patient not taking: Reported on 9/11/2023), Disp: 1 each, Rfl: 0    Continuous Blood Gluc Sensor (FreeStyle Denisa 2 Sensor) MISC, Use 1 each every 14 (fourteen) days (Patient not taking: Reported on 9/11/2023), Disp: 2 each, Rfl: 0    docusate sodium (COLACE) 100 mg capsule, Take 1 capsule (100 mg total) by mouth 2 (two) times a day, Disp: 60 capsule, Rfl: 0    Fluticasone-Salmeterol (Advair) 250-50 mcg/dose inhaler, Inhale 1 puff 2 (two) times a day Rinse mouth after use., Disp: , Rfl:      gabapentin (NEURONTIN) 100 mg capsule, Take 1 capsule (100 mg total) by mouth 2 (two) times a day, Disp: 60 capsule, Rfl: 3    hydrALAZINE (APRESOLINE) 10 mg tablet, Take 1 tablet (10 mg total) by mouth 3 (three) times a day, Disp: 90 tablet, Rfl: 3    insulin glargine (LANTUS) 100 units/mL subcutaneous injection, Inject 18 Units under the skin every 12 (twelve) hours (Patient taking differently: Inject 15 Units under the skin every 12 (twelve) hours), Disp: 10 mL, Rfl: 0    insulin lispro (HumaLOG KwikPen) 100 units/mL injection pen, Inject 5 Units under the skin 3 (three) times a day with meals, Disp: , Rfl:     Insulin Pen Needle (BD Pen Needle Tita 2nd Gen) 32G X 4 MM MISC, For use with insulin pen. Pharmacy may dispense brand covered by insurance. (Patient not taking: Reported on 9/11/2023), Disp: 100 each, Rfl: 0    Insulin Pen Needle (BD Pen Needle Tita U/F) 32G X 4 MM MISC, Use four times daily as directed with insulin pen (Patient not taking: Reported on 9/11/2023), Disp: 200 each, Rfl: 3    Lancets (OneTouch Delica Plus [id number]) MISC, Use 1 each 4 (four) times a day (Patient not taking: Reported on 9/11/2023), Disp: 200 each, Rfl: 2    loratadine (CLARITIN) 10 mg tablet, Take 10 mg by mouth daily, Disp: , Rfl:     losartan (COZAAR) 100 MG tablet, Take 1 tablet (100 mg total) by mouth daily, Disp: 30 tablet, Rfl: 5    Menthol, Topical Analgesic, (Biofreeze) 4 % GEL, Apply 1 Application topically 2 (two) times a day, Disp: , Rfl:     metFORMIN (GLUCOPHAGE) 1000 MG tablet, Take 1 tablet (1,000 mg total) by mouth 2 (two) times a day with meals, Disp: 60 tablet, Rfl: 3    metoprolol tartrate (LOPRESSOR) 100 mg tablet, Take 1 tablet (100 mg total) by mouth every 12 (twelve) hours, Disp: 60 tablet, Rfl: 3    multivitamin-iron-minerals-folic acid (THERAPEUTIC-M) TABS tablet, Take 1 tablet by mouth daily, Disp: 30 tablet, Rfl: 2    nystatin (MYCOSTATIN) powder, Apply topically 2 (two) times a day, Disp: ,  Rfl: 0    predniSONE 1 mg tablet, Take 4 tablets (4 mg total) by mouth daily, Disp: 120 tablet, Rfl: 0    topiramate (TOPAMAX) 100 mg tablet, Take 1 tablet (100 mg total) by mouth daily at bedtime (Patient taking differently: Take 100 mg by mouth daily at bedtime), Disp: 30 tablet, Rfl: 0  No current facility-administered medications for this visit.    Review of Systems   Constitutional:  Negative for chills and fever.   Respiratory:  Negative for cough and shortness of breath.    Cardiovascular:  Negative for chest pain.   Gastrointestinal:  Negative for nausea and vomiting.   Skin:  Positive for wound.   Neurological:  Positive for numbness. Negative for weakness.       Objective:  /80   Pulse 68   Temp 97.5 °F (36.4 °C)         Physical Exam  Vitals and nursing note reviewed.   Constitutional:       General: She is not in acute distress.     Appearance: She is obese. She is not toxic-appearing.   Cardiovascular:      Rate and Rhythm: Normal rate and regular rhythm.      Pulses: Decreased pulses.           Dorsalis pedis pulses are detected w/ Doppler on the right side and detected w/ Doppler on the left side.        Posterior tibial pulses are detected w/ Doppler on the right side and detected w/ Doppler on the left side.      Comments: Biphasic signals bilateral DP and PTA.  Pulmonary:      Effort: Pulmonary effort is normal. No respiratory distress.   Musculoskeletal:         General: No tenderness or signs of injury.      Right lower leg: Edema present.      Left lower leg: Edema present.   Skin:     General: Skin is warm.      Capillary Refill: Capillary refill takes less than 2 seconds.      Coloration: Skin is not cyanotic or mottled.      Findings: Wound present. No abscess.      Nails: There is no clubbing.      Comments: Ulcer in right submet 5 is healed.  Full thickness ulcers in right submet 5 base and left submet 5.  Increased granular tissues.  Decrease in size and depth.  Wounds do not probe  to bone.  No signs of active infection.  See wound assessment and photo.   Neurological:      General: No focal deficit present.      Mental Status: She is alert and oriented to person, place, and time.      Cranial Nerves: No cranial nerve deficit.      Sensory: Sensory deficit present.      Coordination: Coordination normal.   Psychiatric:         Mood and Affect: Mood normal.         Behavior: Behavior normal.         Thought Content: Thought content normal.         Judgment: Judgment normal.         Wound 09/11/23 Diabetic Ulcer Foot Left;Plantar (Active)   Wound Image Images linked 01/08/24 0950   Wound Description Yellow;Pink;Epithelialization 01/08/24 0951   Soraya-wound Assessment Dry 01/08/24 0951   Wound Length (cm) 0.9 cm 01/08/24 0951   Wound Width (cm) 0.6 cm 01/08/24 0951   Wound Depth (cm) 0.2 cm 01/08/24 0951   Wound Surface Area (cm^2) 0.54 cm^2 01/08/24 0951   Wound Volume (cm^3) 0.108 cm^3 01/08/24 0951   Calculated Wound Volume (cm^3) 0.11 cm^3 01/08/24 0951   Drainage Amount Small 01/08/24 0951   Drainage Description Serosanguineous;Tan 01/08/24 0951   Non-staged Wound Description Full thickness 01/08/24 0951   Treatments Irrigation with NSS 01/08/24 0951   Dressing Changed Changed 01/08/24 0951   Patient Tolerance Tolerated well 01/08/24 0951   Dressing Status Intact 01/08/24 0951       Wound 09/11/23 Diabetic Ulcer Foot Right;Plantar;Distal (Active)   Wound Image Images linked 01/08/24 0953   Wound Description Intact 01/08/24 0952   Soraya-wound Assessment Dry 01/08/24 0952   Wound Length (cm) 0 cm 01/08/24 0952   Wound Width (cm) 0 cm 01/08/24 0952   Wound Depth (cm) 0 cm 01/08/24 0952   Wound Surface Area (cm^2) 0 cm^2 01/08/24 0952   Wound Volume (cm^3) 0 cm^3 01/08/24 0952   Calculated Wound Volume (cm^3) 0 cm^3 01/08/24 0952   Change in Wound Size % 100 01/08/24 0952   Drainage Amount None 01/08/24 0952   Patient Tolerance Tolerated well 01/08/24 0952   Dressing Status Removed 01/08/24 0952  "      Wound 09/11/23 Diabetic Ulcer Foot Right;Plantar;Proximal (Active)   Wound Image Images linked 01/08/24 1014   Wound Description Slough;Yellow 01/08/24 0955   Soraya-wound Assessment Dry 01/08/24 0955   Wound Length (cm) 1 cm 01/08/24 0955   Wound Width (cm) 0.7 cm 01/08/24 0955   Wound Depth (cm) 0.2 cm 01/08/24 0955   Wound Surface Area (cm^2) 0.7 cm^2 01/08/24 0955   Wound Volume (cm^3) 0.14 cm^3 01/08/24 0955   Calculated Wound Volume (cm^3) 0.14 cm^3 01/08/24 0955   Drainage Amount Small 01/08/24 0955   Drainage Description Serosanguineous;Tan 01/08/24 0955   Non-staged Wound Description Full thickness 01/08/24 0955   Treatments Irrigation with NSS 01/08/24 0955   Dressing Changed Changed 01/08/24 0955   Patient Tolerance Tolerated well 01/08/24 0955   Dressing Status Removed 01/08/24 0955       Debridement   Wound 09/11/23 Diabetic Ulcer Foot Right;Plantar;Proximal    Universal Protocol:  Consent: Verbal consent obtained.  Risks and benefits: risks, benefits and alternatives were discussed  Consent given by: patient  Time out: Immediately prior to procedure a \"time out\" was called to verify the correct patient, procedure, equipment, support staff and site/side marked as required.  Timeout called at: 1/8/2024 10:14 AM.  Patient understanding: patient states understanding of the procedure being performed  Patient identity confirmed: verbally with patient    Debridement Details  Performed by: physician  Debridement type: surgical  Level of debridement: subcutaneous tissue  Pain control: lidocaine 4%      Post-debridement measurements  Length (cm): 1.1  Width (cm): 0.8  Depth (cm): 0.2  Percent debrided: 100%  Surface Area (cm^2): 0.88  Area Debrided (cm^2): 0.88  Volume (cm^3): 0.18    Tissue and other material debrided: dermis, epidermis and subcutaneous tissue  Devitalized tissue debrided: callus, fibrin and slough  Instrument(s) utilized: blade  Bleeding: small  Hemostasis obtained with: " pressure  Procedural pain (0-10): 0  Post-procedural pain: 0   Response to treatment: procedure was tolerated well         Results from last 6 Months   Lab Units 08/09/23  0945   WOUND CULTURE  4+ Growth of       Lab Results   Component Value Date    HGBA1C 10.2 (H) 08/24/2023       Wound Instructions:  Orders Placed This Encounter   Procedures    Wound cleansing and dressings Diabetic Ulcer Right;Plantar;Proximal Foot     Debridement Diabetic Ulcer Right;Plantar;Proximal Foot       This order was created via procedure documentation  · Chemical caut of a wound Diabetic Ulcer Left;Plantar Foot      This order was created via procedure documentation  · Wound cleansing and dressings      Wound cleansing and dressings          Bilateral foot wounds     May shower with protection only. Keep dressings clean,dry, and intact      R and left lateral plantar midfoot wounds :    Cleanse wounds with normal saline. Pat dry  RESUME SANTYL  and redress as follows:  Thin layer Zinc oxide to periwound skin  Cover with 1/2 -  ABD  Secure with tita and tape  Change every 2 days  Spandagrip size g to bilateral LE from base of toes to knee     Thin layer Zinc oxide to the periwound skin  Resume nickel thick santyl to all wounds.   Cover with 1/2 - 1 ABD  Secure with tita wrap and tape  Change dressing daily until next visit     Continue non-weight bearing to both feet   Patient may heel touch to Left foot for transfers and Physical Therapy      Elevate both lower legs above heart level as much as you can as high as you can when sitting and during the night     Increase proteins to 3-4 servings of protein daily     Follow up in 2 weeks     Today's wound treatment note:  All wounds cleansed with normal saline  Purachol applied to wound bed today in wound center.   Redressed as ordered above     Standing Status:   Future     Standing Expiration Date:   1/8/2025    Debridement Diabetic Ulcer Right;Plantar;Proximal Foot     This order was  created via procedure documentation             Natanael Carr DPM

## 2024-01-10 ENCOUNTER — VBI (OUTPATIENT)
Dept: ADMINISTRATIVE | Facility: OTHER | Age: 64
End: 2024-01-10

## 2024-01-22 ENCOUNTER — OFFICE VISIT (OUTPATIENT)
Dept: WOUND CARE | Facility: CLINIC | Age: 64
End: 2024-01-22
Payer: COMMERCIAL

## 2024-01-22 VITALS
RESPIRATION RATE: 16 BRPM | SYSTOLIC BLOOD PRESSURE: 132 MMHG | TEMPERATURE: 98 F | DIASTOLIC BLOOD PRESSURE: 74 MMHG | HEART RATE: 64 BPM | OXYGEN SATURATION: 95 %

## 2024-01-22 DIAGNOSIS — Z79.4 TYPE 2 DIABETES MELLITUS WITH DIABETIC NEUROPATHY, WITH LONG-TERM CURRENT USE OF INSULIN (HCC): ICD-10-CM

## 2024-01-22 DIAGNOSIS — L97.522 ULCER OF LEFT FOOT, WITH FAT LAYER EXPOSED (HCC): ICD-10-CM

## 2024-01-22 DIAGNOSIS — E11.40 TYPE 2 DIABETES MELLITUS WITH DIABETIC NEUROPATHY, WITH LONG-TERM CURRENT USE OF INSULIN (HCC): ICD-10-CM

## 2024-01-22 DIAGNOSIS — L97.412 ULCER OF RIGHT MIDFOOT WITH FAT LAYER EXPOSED (HCC): Primary | ICD-10-CM

## 2024-01-22 PROCEDURE — 99213 OFFICE O/P EST LOW 20 MIN: CPT | Performed by: PODIATRIST

## 2024-01-22 RX ORDER — LIDOCAINE 40 MG/G
CREAM TOPICAL ONCE
Status: COMPLETED | OUTPATIENT
Start: 2024-01-22 | End: 2024-01-22

## 2024-01-22 RX ADMIN — LIDOCAINE: 40 CREAM TOPICAL at 08:55

## 2024-01-22 NOTE — PROGRESS NOTES
Patient ID: Edie Salazar is a 63 y.o. female Date of Birth 1960       Chief Complaint   Patient presents with    Follow Up Wound Care Visit     Bl plantar foot wounds.  Pt arrives from facility with aide.        Allergies:  Patient has no known allergies.    Assessments:      Diagnosis ICD-10-CM Associated Orders   1. Ulcer of right midfoot with fat layer exposed ([facility])  L97.412 lidocaine (LMX) 4 % cream     Wound cleansing and dressings      2. Ulcer of left foot, with fat layer exposed ([facility])  L97.522 lidocaine (LMX) 4 % cream     Wound cleansing and dressings      3. Type 2 diabetes mellitus with diabetic neuropathy, with long-term current use of insulin ([facility])  E11.40 lidocaine (LMX) 4 % cream    Z79.4 Wound cleansing and dressings               Plan:  1. Reviewed medical records.  Patient was counseled and educated on the condition and the diagnosis.    2. The diagnosis, treatment options and prognosis were discussed with the patient.    3. Wounds are healing well.  Continue Santyl.  Debridement as needed at this time.    4. Stressed on patient compliance about proper off-loading, and staying off of foot.  Recommended elevation of legs to reduce LE edema.  Tubigrip for compression on her legs.    5. Patient will return in 2 weeks for re-evaluation.      Imaging: I have personally reviewed pertinent films in PACS  Labs, pathology, and Other Studies: I have personally reviewed pertinent reports.      Subjective:     HPI  The patient presents for wound care in both feet.  No pain.  No new complaint.        The following portions of the patient's history were reviewed and updated as appropriate:   Patient Active Problem List   Diagnosis    Uncontrolled type 2 diabetes mellitus with hyperglycemia (HCC)    Seizures (HCC)    Exertional dyspnea    Enlarged pulmonary artery (HCC)    Aortic dilatation (HCC)    Anemia    Decreased pulses in feet    Hyperlipidemia    Microalbuminuria    Neuropathy of hand, right    QT  prolongation    Visual impairment in both eyes    Vitamin D deficiency    Lung nodules    Orthostatic hypotension    Mild intermittent asthma without complication    Type 2 diabetes mellitus with microalbuminuria, with long-term current use of insulin (HCC)    Other inflammatory and immune myopathies, not elsewhere classified    Morbid obesity (HCC)    Polyneuropathy associated with underlying disease (HCC)    PMR (polymyalgia rheumatica) (HCC)    Thrombocytosis    Left cavernous carotid aneurysm    Type 2 diabetes mellitus with hyperglycemia, with long-term current use of insulin (HCC)    Aneurysm (HCC)    Thyroid nodule    History of absence seizures    Hypersomnia    Cerebral aneurysm without rupture    Chronic migraine without aura without status migrainosus, not intractable    Hypertension    Depressed mood    Blurry vision, bilateral    Ulnar neuropathy of right upper extremity    Polymyalgia rheumatica (HCC)    Gait instability    Stage 3a chronic kidney disease ([facility])    Obstructive sleep apnea    Diabetic neuropathy (HCC)    Unsteadiness on feet    Abnormal urinalysis    Weakness    Intertrigo    Leg numbness    Unsheltered homelessness    Bilateral lower extremity edema    Diarrhea    Dyslipidemia    Insulin long-term use (HCC)    Type 2 diabetes mellitus with hyperglycemia (HCC)    Ambulatory dysfunction    Legally blind    Suspected pressure injury of deep tissue    Calculus of gallbladder without cholecystitis without obstruction    Diabetes mellitus (HCC)    Abnormal CT scan    Friction blisters of the soles    Leukocytosis    Encounter for support and coordination of transition of care    Food insecurity    Homelessness    Ulcer of left heel (HCC)    Pulmonary embolism (HCC)    Diabetic foot ulcer (HCC)    Cellulitis of lower extremity    Chronic anemia    Viral upper respiratory illness     Past Medical History:   Diagnosis Date    Anemia     Benign hypertension     Procedure/Onset: 01/01/2005;  Description: BP elevated today. Pt reports running out of BP meds 2wks ago. Will resume BP meds.    Diabetes mellitus (HCC)     Diabetes mellitus type 2 with complications, uncontrolled 9/8/2015    Dyspnea on exertion     Hyperlipidemia     Hypertension     Legally blind 2010    Miscarriage     x 3    Polymyalgia rheumatica (HCC) 11/24/2021    Seizures (HCC)     Sickle cell trait (HCC)     Stage 3a chronic kidney disease (HCC) 5/19/2022    Thyroid nodule 3/6/2020     Past Surgical History:   Procedure Laterality Date    CATARACT EXTRACTION Bilateral     august and september    EYE SURGERY      HYSTERECTOMY      37    OOPHORECTOMY Left     37    DC LIGATION/BIOPSY TEMPORAL ARTERY Bilateral 10/17/2019    Procedure: BIOPSY ARTERY TEMPORAL;  Surgeon: Bentley Reyes DO;  Location: [facility];  Service: Vascular    US GUIDED THYROID BIOPSY  5/13/2020     Family History   Problem Relation Age of Onset    Heart attack Mother     Heart disease Mother     Hypertension Mother     No Known Problems Father     Heart disease Sister     No Known Problems Sister     No Known Problems Sister     No Known Problems Daughter     Heart attack Maternal Grandmother     Heart disease Maternal Grandmother     No Known Problems Brother     No Known Problems Son     No Known Problems Son     No Known Problems Paternal Aunt     Diabetes Other     Heart attack Other     Cancer Neg Hx     Stroke Neg Hx       Social History     Socioeconomic History    Marital status: /Civil Union     Spouse name: None    Number of children: None    Years of education: None    Highest education level: None   Occupational History    Occupation: long term disability   Tobacco Use    Smoking status: Never    Smokeless tobacco: Never   Vaping Use    Vaping status: Never Used   Substance and Sexual Activity    Alcohol use: Never     Alcohol/week: 0.0 standard drinks of alcohol    Drug use: No    Sexual activity: Not Currently     Partners: Male     Birth  control/protection: None   Other Topics Concern    None   Social History Narrative    Caffeine use    Resides with spouse and one son     Social Determinants of Health     Financial Resource Strain: High Risk (8/25/2023)    Received from [facility]    Overall Financial Resource Strain (CARDIA)     Difficulty of Paying Living Expenses: Very hard   Food Insecurity: Food Insecurity Present (9/26/2023)    Hunger Vital Sign     Worried About Running Out of Food in the Last Year: Often true     Ran Out of Food in the Last Year: Often true   Transportation Needs: Unmet Transportation Needs (9/26/2023)    PRAPARE - Transportation     Lack of Transportation (Medical): Yes     Lack of Transportation (Non-Medical): Yes   Physical Activity: Inactive (2/3/2020)    Exercise Vital Sign     Days of Exercise per Week: 0 days     Minutes of Exercise per Session: 0 min   Stress: No Stress Concern Present (2/3/2020)    [place] [employer] - Occupational Stress Questionnaire     Feeling of Stress : Only a little   Social Connections: Unknown (2/3/2020)    Social Connection and Isolation Panel [NHANES]     Frequency of Communication with Friends and Family: Twice a week     Frequency of Social Gatherings with Friends and Family: Twice a week     Attends [employer] Services: Not on file     Active Member of Clubs or Organizations: Not on file     Attends Club or Organization Meetings: Not on file     Marital Status:    Intimate Partner Violence: Not At Risk (8/25/2023)    Received from [facility]    Humiliation, Afraid, Rape, and Kick questionnaire     Fear of Current or Ex-Partner: No     Emotionally Abused: No     Physically Abused: No     Sexually Abused: No   Housing Stability: High Risk (9/26/2023)    Housing Stability Vital Sign     Unable to Pay for Housing in the Last Year: Yes     Number of Places Lived in the Last Year: 3     Unstable Housing in the Last Year: Yes         Current Outpatient Medications:     acetaminophen (TYLENOL) 325 mg tablet, Take 650 mg by mouth every 6 (six) hours as needed for mild pain, Disp: , Rfl:     albuterol (PROVENTIL HFA,VENTOLIN HFA) 90 mcg/act inhaler, Inhale 2 puffs every 6 (six) hours as needed for wheezing, Disp: , Rfl:     amLODIPine (NORVASC) 5 mg tablet, Take 1 tablet (5 mg total) by mouth daily, Disp: 30 tablet, Rfl: 3    apixaban (ELIQUIS) 5 mg, Take 1 tablet (5 mg total) by mouth 2 (two) times a day Do not start before August 17, 2023., Disp: , Rfl: 0    atorvastatin (LIPITOR) 40 mg tablet, Take 1 tablet (40 mg total) by mouth daily, Disp: 90 tablet, Rfl: 3    Blood Glucose Monitoring Suppl (OneTouch Verio Reflect) w/Device KIT, Use 1 each 4 (four) times a day (Patient not taking: Reported on 9/11/2023), Disp: 1 kit, Rfl: 0    Blood Pressure Monitor KIT, Check blood pressures BID (Patient not taking: Reported on 9/11/2023), Disp: 1 kit, Rfl: 0    cholecalciferol (VITAMIN D3) 1,000 units tablet, Take 2 tablets (2,000 Units total) by mouth daily, Disp: 60 tablet, Rfl: 2    collagenase (SANTYL) ointment, Apply 1 Application topically daily Apply to right distal plantar foot diabetic ulcer, apply to right proximal plantar foot diabetic ulcer, apply to left plantar foot diabetic ulcer, Disp: , Rfl:     Continuous Blood Gluc  (FreeStyle Denisa 2 [place]) ERCI, Use 1 each 4 (four) times a day (Patient not taking: Reported on 9/11/2023), Disp: 1 each, Rfl: 0    Continuous Blood Gluc Sensor (FreeStyle Denisa 2 Sensor) MISC, Use 1 each every 14 (fourteen) days (Patient not taking: Reported on 9/11/2023), Disp: 2 each, Rfl: 0    docusate sodium (COLACE) 100 mg capsule, Take 1 capsule (100 mg total) by mouth 2 (two) times a day, Disp: 60 capsule, Rfl: 0    Fluticasone-Salmeterol (Advair) 250-50 mcg/dose inhaler, Inhale 1 puff 2 (two) times a day Rinse mouth after use., Disp: , Rfl:     gabapentin (NEURONTIN) 100 mg capsule, Take 1 capsule (100  mg total) by mouth 2 (two) times a day, Disp: 60 capsule, Rfl: 3    hydrALAZINE (APRESOLINE) 10 mg tablet, Take 1 tablet (10 mg total) by mouth 3 (three) times a day, Disp: 90 tablet, Rfl: 3    insulin glargine (LANTUS) 100 units/mL subcutaneous injection, Inject 18 Units under the skin every 12 (twelve) hours (Patient taking differently: Inject 15 Units under the skin every 12 (twelve) hours), Disp: 10 mL, Rfl: 0    insulin lispro (HumaLOG KwikPen) 100 units/mL injection pen, Inject 5 Units under the skin 3 (three) times a day with meals, Disp: , Rfl:     Insulin Pen Needle (BD Pen Needle Tita 2nd Gen) 32G X 4 MM MISC, For use with insulin pen. Pharmacy may dispense brand covered by insurance. (Patient not taking: Reported on 9/11/2023), Disp: 100 each, Rfl: 0    Insulin Pen Needle (BD Pen Needle Tita U/F) 32G X 4 MM MISC, Use four times daily as directed with insulin pen (Patient not taking: Reported on 9/11/2023), Disp: 200 each, Rfl: 3    Lancets (OneTouch Delica Plus [id number]) MISC, Use 1 each 4 (four) times a day (Patient not taking: Reported on 9/11/2023), Disp: 200 each, Rfl: 2    loratadine (CLARITIN) 10 mg tablet, Take 10 mg by mouth daily, Disp: , Rfl:     losartan (COZAAR) 100 MG tablet, Take 1 tablet (100 mg total) by mouth daily, Disp: 30 tablet, Rfl: 5    Menthol, Topical Analgesic, (Biofreeze) 4 % GEL, Apply 1 Application topically 2 (two) times a day, Disp: , Rfl:     metFORMIN (GLUCOPHAGE) 1000 MG tablet, Take 1 tablet (1,000 mg total) by mouth 2 (two) times a day with meals, Disp: 60 tablet, Rfl: 3    metoprolol tartrate (LOPRESSOR) 100 mg tablet, Take 1 tablet (100 mg total) by mouth every 12 (twelve) hours, Disp: 60 tablet, Rfl: 3    multivitamin-iron-minerals-folic acid (THERAPEUTIC-M) TABS tablet, Take 1 tablet by mouth daily, Disp: 30 tablet, Rfl: 2    nystatin (MYCOSTATIN) powder, Apply topically 2 (two) times a day, Disp: , Rfl: 0    predniSONE 1 mg tablet, Take 4 tablets (4 mg total) by  mouth daily, Disp: 120 tablet, Rfl: 0    topiramate (TOPAMAX) 100 mg tablet, Take 1 tablet (100 mg total) by mouth daily at bedtime (Patient taking differently: Take 100 mg by mouth daily at bedtime), Disp: 30 tablet, Rfl: 0  No current facility-administered medications for this visit.    Review of Systems   Constitutional:  Negative for chills and fever.   Respiratory:  Negative for cough and shortness of breath.    Cardiovascular:  Negative for chest pain.   Gastrointestinal:  Negative for nausea and vomiting.   Skin:  Positive for wound.   Neurological:  Positive for numbness. Negative for weakness.       Objective:  /74   Pulse 64   Temp 98 °F (36.7 °C) (Tympanic)   Resp 16   SpO2 95%   Pain Score: 0-No pain     Physical Exam  Vitals and nursing note reviewed.   Constitutional:       General: She is not in acute distress.     Appearance: She is obese. She is not toxic-appearing.   Cardiovascular:      Rate and Rhythm: Normal rate and regular rhythm.      Pulses: Decreased pulses.           Dorsalis pedis pulses are detected w/ Doppler on the right side and detected w/ Doppler on the left side.        Posterior tibial pulses are detected w/ Doppler on the right side and detected w/ Doppler on the left side.      Comments: Biphasic signals bilateral DP and PTA.  Pulmonary:      Effort: Pulmonary effort is normal. No respiratory distress.   Musculoskeletal:         General: No tenderness or signs of injury.      Right lower leg: Edema present.      Left lower leg: Edema present.   Skin:     General: Skin is warm.      Capillary Refill: Capillary refill takes less than 2 seconds.      Coloration: Skin is not cyanotic or mottled.      Findings: Wound present. No abscess.      Nails: There is no clubbing.      Comments: DFU in right submet 5 base and left submet 5.  Increased granular tissues.  Decrease in size and depth.  Wounds do not probe to bone.  No signs of active infection.  See wound assessment and  photo.   Neurological:      General: No focal deficit present.      Mental Status: She is alert and oriented to person, place, and time.      Cranial Nerves: No cranial nerve deficit.      Sensory: Sensory deficit present.      Coordination: Coordination normal.   Psychiatric:         Mood and Affect: Mood normal.         Behavior: Behavior normal.         Thought Content: Thought content normal.         Judgment: Judgment normal.         Wound 09/11/23 Diabetic Ulcer Foot Left;Plantar (Active)   Wound Image Images linked 01/22/24 0852         Procedures     Results from last 6 Months   Lab Units 08/09/23  0945   WOUND CULTURE  4+ Growth of       Lab Results   Component Value Date    HGBA1C 10.2 (H) 08/24/2023       Wound Instructions:  Orders Placed This Encounter   Procedures    Wound cleansing and dressings     Bilateral foot wounds     May shower with protection only. Keep dressings clean,dry, and intact      R and left lateral plantar foot wounds :     Cleanse wounds with normal saline. Pat dry  Cont SANTYL  and redress as follows:  Thin layer Zinc oxide to periwound skin  Cover with 1/2 -  ABD  Secure with tita and tape  Change every 2 days  Spandagrip size g to bilateral LE from base of toes to knee     Thin layer Zinc oxide to the periwound skin  Resume nickel thick santyl to all wounds.   Cover with 1/2 - 1 ABD  Secure with tita wrap and tape  Change dressing daily until next visit     Continue non-weight bearing to both feet   Patient may heel touch to Left foot for transfers and Physical Therapy    Elevate both lower legs above heart level as much as you can as high as you can when sitting and during the night     Increase proteins to 3-4 servings of protein daily  Follow up in 2 weeks     Today's wound treatment note:  All wounds cleansed with normal saline  Purachol applied to wound bed today in wound center.   Redressed as ordered above     Standing Status:   Future     Standing Expiration Date:    1/22/2025             Natanael Carr DPM

## 2024-01-22 NOTE — PATIENT INSTRUCTIONS
Orders Placed This Encounter   Procedures    Wound cleansing and dressings     Bilateral foot wounds     May shower with protection only. Keep dressings clean,dry, and intact      R and left lateral plantar foot wounds :     Cleanse wounds with normal saline. Pat dry  Cont SANTYL  and redress as follows:  Thin layer Zinc oxide to periwound skin  Cover with 1/2 -  ABD  Secure with tita and tape  Change every 2 days  Spandagrip size g to bilateral LE from base of toes to knee     Thin layer Zinc oxide to the periwound skin  Resume nickel thick santyl to all wounds.   Cover with 1/2 - 1 ABD  Secure with tita wrap and tape  Change dressing daily until next visit     Continue non-weight bearing to both feet   Patient may heel touch to Left foot for transfers and Physical Therapy    Elevate both lower legs above heart level as much as you can as high as you can when sitting and during the night     Increase proteins to 3-4 servings of protein daily  Follow up in 2 weeks     Today's wound treatment note:  All wounds cleansed with normal saline  Purachol applied to wound bed today in wound center.   Redressed as ordered above     Standing Status:   Future     Standing Expiration Date:   1/22/2025

## 2024-01-27 ENCOUNTER — NURSING HOME VISIT (OUTPATIENT)
Dept: GERIATRICS | Facility: OTHER | Age: 64
End: 2024-01-27
Payer: COMMERCIAL

## 2024-01-27 DIAGNOSIS — E13.621 DIABETIC ULCER OF FOOT ASSOCIATED WITH DIABETES MELLITUS OF OTHER TYPE, WITH FAT LAYER EXPOSED, UNSPECIFIED LATERALITY, UNSPECIFIED PART OF FOOT (HCC): ICD-10-CM

## 2024-01-27 DIAGNOSIS — Z79.4 TYPE 2 DIABETES MELLITUS WITH HYPERGLYCEMIA, WITH LONG-TERM CURRENT USE OF INSULIN (HCC): ICD-10-CM

## 2024-01-27 DIAGNOSIS — E11.40 TYPE 2 DIABETES MELLITUS WITH DIABETIC NEUROPATHY, WITH LONG-TERM CURRENT USE OF INSULIN (HCC): ICD-10-CM

## 2024-01-27 DIAGNOSIS — I10 PRIMARY HYPERTENSION: ICD-10-CM

## 2024-01-27 DIAGNOSIS — E11.65 TYPE 2 DIABETES MELLITUS WITH HYPERGLYCEMIA, WITH LONG-TERM CURRENT USE OF INSULIN (HCC): ICD-10-CM

## 2024-01-27 DIAGNOSIS — R26.2 AMBULATORY DYSFUNCTION: ICD-10-CM

## 2024-01-27 DIAGNOSIS — Z79.4 TYPE 2 DIABETES MELLITUS WITH DIABETIC NEUROPATHY, WITH LONG-TERM CURRENT USE OF INSULIN (HCC): ICD-10-CM

## 2024-01-27 DIAGNOSIS — I26.99 ACUTE PULMONARY EMBOLISM, UNSPECIFIED PULMONARY EMBOLISM TYPE, UNSPECIFIED WHETHER ACUTE COR PULMONALE PRESENT (HCC): ICD-10-CM

## 2024-01-27 DIAGNOSIS — L97.502 DIABETIC ULCER OF FOOT ASSOCIATED WITH DIABETES MELLITUS OF OTHER TYPE, WITH FAT LAYER EXPOSED, UNSPECIFIED LATERALITY, UNSPECIFIED PART OF FOOT (HCC): ICD-10-CM

## 2024-01-27 PROBLEM — J06.9 VIRAL UPPER RESPIRATORY ILLNESS: Status: RESOLVED | Noted: 2023-11-28 | Resolved: 2024-01-27

## 2024-01-27 PROCEDURE — 99305 1ST NF CARE MODERATE MDM 35: CPT | Performed by: FAMILY MEDICINE

## 2024-01-27 NOTE — PROGRESS NOTES
[facility]  [address]  [place] [address]  Facility:[place] [id number]    HISTORY AND PHYSICAL    NAME: Edie Salazar  AGE: 63 y.o. SEX: female    DATE OF ENCOUNTER: 1/27/2024    Code status:  CPR    Assessment and Plan     1. 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        2. Acute pulmonary embolism, unspecified pulmonary embolism type, unspecified whether acute cor pulmonale present ([facility])  Assessment & Plan:  Has history of PVD  Currently on treatment with Eliquis  Continue same      3. Type 2 diabetes mellitus with diabetic neuropathy, with long-term current use of insulin ([facility])  Assessment & Plan:  Last wound care note reviewed  Patient was advised to be compliant with proper offloading and staying off of foot.  Elevate legs to reduce edema  Continue Tubigrip  Follows with wound management every 2 weeks  Increase protein in diet for wound healing.        4. Type 2 diabetes mellitus with hyperglycemia, with long-term current use of insulin ([facility])  Assessment & Plan:  Improved recent A1c  Recent A1c 7  Continue current dose of metformin/insulin/lispro        5. Primary hypertension  Assessment & Plan:  Has had issues with medication adherence in the past   Discussed compliance   Continue hydralazine/losartan/amlodipine and metoprolol   continue current medications        6. Ambulatory dysfunction  Assessment & Plan:  Encouraged to offload weight to promote wound healing in her lower extremity.  Continue use of wheelchair  Avoid falls  Continue PT OT          All medications and routine orders were reviewed and updated as needed.    Plan discussed with: Patient          Chief Complaint     Seen for follow up at Nursing Home  Medical comorbidities significant for type 2 diabetes mellitus, PMR, CKD stage IIIa, hypertension, hyperlipidemia and vitamin D deficiency  Sitting comfortably in her chair, very unhappy because she does not like being in the nursing home.  Her son was present during the conversation.  Does not like being in the nursing home and wants to get her own place.  Diffuse      History of Present Illness     Patient is a-year-old female seen for follow-up  Comorbidities significant for type 2 diabetes mellitus, PMR, CKD stage IIIa, hypertension, hyperlipidemia and vitamin D deficiency  Sitting comfortably in her chair, very unhappy because she does not like being in the nursing home.  Her son was present during the conversation.  Does not like being in the nursing home and wants to get her own place.      HISTORY:  Past Medical History:   Diagnosis Date    Anemia     Benign hypertension     Procedure/Onset: 01/01/2005; Description: BP elevated today. Pt reports running out of BP meds 2wks ago. Will resume BP meds.    Diabetes mellitus (HCC)     Diabetes mellitus type 2 with complications, uncontrolled 9/8/2015    Dyspnea on exertion     Hyperlipidemia     Hypertension     Legally blind 2010    Miscarriage     x 3    Polymyalgia rheumatica (HCC) 11/24/2021    Seizures (HCC)     Sickle cell trait (HCC)     Stage 3a chronic kidney disease (HCC) 5/19/2022    Thyroid nodule 3/6/2020     Past Surgical History:   Procedure Laterality Date    CATARACT EXTRACTION Bilateral     august and september    EYE SURGERY      HYSTERECTOMY      37    OOPHORECTOMY Left     37    SC LIGATION/BIOPSY TEMPORAL ARTERY Bilateral 10/17/2019    Procedure: BIOPSY ARTERY TEMPORAL;  Surgeon: Bentley Reyes DO;  Location: [facility];  Service: Vascular    US GUIDED THYROID BIOPSY  5/13/2020     Family History   Problem Relation Age of Onset    Heart attack Mother     Heart disease Mother      Hypertension Mother     No Known Problems Father     Heart disease Sister     No Known Problems Sister     No Known Problems Sister     No Known Problems Daughter     Heart attack Maternal Grandmother     Heart disease Maternal Grandmother     No Known Problems Brother     No Known Problems Son     No Known Problems Son     No Known Problems Paternal Aunt     Diabetes Other     Heart attack Other     Cancer Neg Hx     Stroke Neg Hx      Social History     Socioeconomic History    Marital status: /Civil Union     Spouse name: Not on file    Number of children: Not on file    Years of education: Not on file    Highest education level: Not on file   Occupational History    Occupation: long term disability   Tobacco Use    Smoking status: Never    Smokeless tobacco: Never   Vaping Use    Vaping status: Never Used   Substance and Sexual Activity    Alcohol use: Never     Alcohol/week: 0.0 standard drinks of alcohol    Drug use: No    Sexual activity: Not Currently     Partners: Male     Birth control/protection: None   Other Topics Concern    Not on file   Social History Narrative    Caffeine use    Resides with spouse and one son     Social Determinants of Health     Financial Resource Strain: High Risk (8/25/2023)    Received from [facility]    Overall Financial Resource Strain (CARDIA)     Difficulty of Paying Living Expenses: Very hard   Food Insecurity: Food Insecurity Present (9/26/2023)    Hunger Vital Sign     Worried About Running Out of Food in the Last Year: Often true     Ran Out of Food in the Last Year: Often true   Transportation Needs: Unmet Transportation Needs (9/26/2023)    PRAPARE - Transportation     Lack of Transportation (Medical): Yes     Lack of Transportation (Non-Medical): Yes   Physical Activity: Inactive (2/3/2020)    Exercise Vital Sign     Days of Exercise per Week: 0 days     Minutes of Exercise per Session: 0 min   Stress: No Stress Concern Present (2/3/2020)     [employer] - Occupational Stress Questionnaire     Feeling of Stress : Only a little   Social Connections: Unknown (2/3/2020)    Social Connection and Isolation Panel [NHANES]     Frequency of Communication with Friends and Family: Twice a week     Frequency of Social Gatherings with Friends and Family: Twice a week     Attends Anabaptism Services: Not on file     Active Member of Clubs or Organizations: Not on file     Attends Club or Organization Meetings: Not on file     Marital Status:    Intimate Partner Violence: Not At Risk (8/25/2023)    Received from [facility]    Humiliation, Afraid, Rape, and Kick questionnaire     Fear of Current or Ex-Partner: No     Emotionally Abused: No     Physically Abused: No     Sexually Abused: No   Housing Stability: High Risk (9/26/2023)    Housing Stability Vital Sign     Unable to Pay for Housing in the Last Year: Yes     Number of Places Lived in the Last Year: 3     Unstable Housing in the Last Year: Yes       Allergies:  No Known Allergies    Review of Systems     Review of Systems   Constitutional:  Positive for activity change. Negative for appetite change.   HENT:  Negative for congestion.    Respiratory:  Negative for apnea.    Cardiovascular:  Negative for chest pain.   Gastrointestinal:  Negative for abdominal distention.   Endocrine: Positive for cold intolerance.   Genitourinary:  Negative for difficulty urinating.   Musculoskeletal:  Positive for arthralgias and gait problem.   Neurological:  Negative for dizziness.   Psychiatric/Behavioral:  Negative for agitation.        Medications and orders     All medications reviewed and updated in [facility] EMR.      Objective         Physical Exam  Constitutional:       Appearance: Normal appearance.   HENT:      Head: Normocephalic.      Mouth/Throat:      Mouth: Mucous membranes are moist.   Eyes:      Pupils: Pupils are equal, round, and reactive to light.  "  Cardiovascular:      Rate and Rhythm: Regular rhythm.   Pulmonary:      Effort: Pulmonary effort is normal.      Breath sounds: Normal breath sounds.   Abdominal:      Palpations: Abdomen is soft.   Skin:     General: Skin is dry.   Neurological:      General: No focal deficit present.   Psychiatric:      Comments: Flat Affect         Pertinent Laboratory/Diagnostic Studies:   The following labs/studies were reviewed please see chart or hospital paperwork for details.    - Counseling Documentation: patient was counseled regarding: importance of compliance with treatment    Portions of the record may have been created with voice recognition software.  Occasional wrong word or \"sound a like\" substitutions may have occurred due to the inherent limitations of voice recognition software.  Read the chart carefully and recognize, using context, where substitutions have occurred.    Maribel Nuñez M.D.         "

## 2024-01-29 NOTE — ASSESSMENT & PLAN NOTE
Encouraged to offload weight to promote wound healing in her lower extremity.  Continue use of wheelchair  Avoid falls  Continue PT OT

## 2024-01-29 NOTE — ASSESSMENT & PLAN NOTE
Last wound care note reviewed  Patient was advised to be compliant with proper offloading and staying off of foot.  Elevate legs to reduce edema  Continue Tubigrip  Follows with wound management every 2 weeks  Increase protein in diet for wound healing.

## 2024-01-29 NOTE — ASSESSMENT & PLAN NOTE
Lab Results   Component Value Date    HGBA1C 10.2 (H) 08/24/2023   Last podiatry note reviewed  She was advised to be compliant about offloading and staying off of her foot.

## 2024-01-29 NOTE — ASSESSMENT & PLAN NOTE
Has had issues with medication adherence in the past   Discussed compliance   Continue hydralazine/losartan/amlodipine and metoprolol   continue current medications

## 2024-02-02 ENCOUNTER — TELEPHONE (OUTPATIENT)
Dept: FAMILY MEDICINE CLINIC | Facility: CLINIC | Age: 64
End: 2024-02-02

## 2024-02-02 NOTE — TELEPHONE ENCOUNTER
second attempt to contact patient. left message to return my call on answering machine.      Please assist in scheduling a f/u DM

## 2024-02-05 ENCOUNTER — OFFICE VISIT (OUTPATIENT)
Dept: WOUND CARE | Facility: CLINIC | Age: 64
End: 2024-02-05
Payer: COMMERCIAL

## 2024-02-05 VITALS
DIASTOLIC BLOOD PRESSURE: 74 MMHG | SYSTOLIC BLOOD PRESSURE: 122 MMHG | RESPIRATION RATE: 18 BRPM | HEART RATE: 64 BPM | TEMPERATURE: 97.5 F

## 2024-02-05 DIAGNOSIS — L97.522 ULCER OF LEFT FOOT, WITH FAT LAYER EXPOSED (HCC): ICD-10-CM

## 2024-02-05 DIAGNOSIS — L97.412 ULCER OF RIGHT MIDFOOT WITH FAT LAYER EXPOSED (HCC): Primary | ICD-10-CM

## 2024-02-05 DIAGNOSIS — E11.40 TYPE 2 DIABETES MELLITUS WITH DIABETIC NEUROPATHY, WITH LONG-TERM CURRENT USE OF INSULIN (HCC): ICD-10-CM

## 2024-02-05 DIAGNOSIS — Z79.4 TYPE 2 DIABETES MELLITUS WITH DIABETIC NEUROPATHY, WITH LONG-TERM CURRENT USE OF INSULIN (HCC): ICD-10-CM

## 2024-02-05 PROCEDURE — 99213 OFFICE O/P EST LOW 20 MIN: CPT | Performed by: PODIATRIST

## 2024-02-05 NOTE — PATIENT INSTRUCTIONS
Orders Placed This Encounter   Procedures    Wound cleansing and dressings     Bilateral foot wounds     May shower with protection only. Keep dressings clean,dry, and intact      R and left lateral plantar foot wounds :  Cover with Dry dressing for protection  Change Every other day and as needed for soilage or dislodgement.  Spandagrip size g to bilateral LE from base of toes to knee    Patient may start bearing weight to both feet as tolerated.   Elevate both lower legs above heart level as much as you can as high as you can when sitting and during the night     Increase proteins to 3-4 servings of protein daily  Follow up in 3 weeks     Today's wound treatment note:  All wounds cleansed with normal saline & redressed as ordered above.     Standing Status:   Future     Standing Expiration Date:   2/5/2025

## 2024-02-05 NOTE — PROGRESS NOTES
Patient ID: Edie Salazar is a 63 y.o. female Date of Birth 1960       Chief Complaint   Patient presents with    Follow Up Wound Care Visit     Follow up visit for wounds to bl feet.  Pt denies any issues or concerns since last visit.  Pt noted increase in edema today.        Allergies:  Patient has no known allergies.    Assessments:      Diagnosis ICD-10-CM Associated Orders   1. Ulcer of right midfoot with fat layer exposed ([facility])  L97.412 Wound cleansing and dressings      2. Ulcer of left foot, with fat layer exposed ([facility])  L97.522 Wound cleansing and dressings      3. Type 2 diabetes mellitus with diabetic neuropathy, with long-term current use of insulin ([facility])  E11.40 Wound cleansing and dressings    Z79.4                Plan:  1. Reviewed medical records.  Patient was counseled and educated on the condition and the diagnosis.    2. The diagnosis, treatment options and prognosis were discussed with the patient.    3. Wounds are covered with superficial eschar.  Will use protective dressing to the wounds.    4. Advance PT with full WB on both feet.  Avoid any overuse.    5. Recommended elevation of legs to reduce LE edema.  Tubigrip for compression on her legs.    6. Patient will return in 3 weeks for re-evaluation.      Imaging: I have personally reviewed pertinent films in PACS  Labs, pathology, and Other Studies: I have personally reviewed pertinent reports.      Subjective:     HPI  The patient presents for wound care in both feet.  No pain.  No new complaint.  BS under control.  She has swelling in her legs and presents with no compression.      The following portions of the patient's history were reviewed and updated as appropriate:   Patient Active Problem List   Diagnosis    Uncontrolled type 2 diabetes mellitus with hyperglycemia (HCC)    Seizures (HCC)    Exertional dyspnea    Enlarged pulmonary artery (HCC)    Aortic dilatation (HCC)    Anemia    Decreased pulses in feet    Hyperlipidemia     Microalbuminuria    Neuropathy of hand, right    QT prolongation    Visual impairment in both eyes    Vitamin D deficiency    Lung nodules    Orthostatic hypotension    Mild intermittent asthma without complication    Type 2 diabetes mellitus with microalbuminuria, with long-term current use of insulin (HCC)    Other inflammatory and immune myopathies, not elsewhere classified    Morbid obesity (HCC)    Polyneuropathy associated with underlying disease (HCC)    PMR (polymyalgia rheumatica) ([facility])    Thrombocytosis    Left cavernous carotid aneurysm    Type 2 diabetes mellitus with hyperglycemia, with long-term current use of insulin ([facility])    Aneurysm ([facility])    Thyroid nodule    History of absence seizures    Hypersomnia    Cerebral aneurysm without rupture    Chronic migraine without aura without status migrainosus, not intractable    Hypertension    Depressed mood    Blurry vision, bilateral    Ulnar neuropathy of right upper extremity    Polymyalgia rheumatica ([facility])    Gait instability    Stage 3a chronic kidney disease ([facility])    Obstructive sleep apnea    Diabetic neuropathy ([facility])    Unsteadiness on feet    Abnormal urinalysis    Weakness    Intertrigo    Leg numbness    Unsheltered homelessness    Bilateral lower extremity edema    Diarrhea    Dyslipidemia    Insulin long-term use (HCC)    Type 2 diabetes mellitus with hyperglycemia ([facility])    Ambulatory dysfunction    Legally blind    Suspected pressure injury of deep tissue    Calculus of gallbladder without cholecystitis without obstruction    Diabetes mellitus ([facility])    Abnormal CT scan    Friction blisters of the soles    Leukocytosis    Encounter for support and coordination of transition of care    Food insecurity    Homelessness    Ulcer of left heel (HCC)    Pulmonary embolism (HCC)    Diabetic foot ulcer (HCC)    Cellulitis of lower extremity    Chronic anemia     Past Medical History:   Diagnosis Date    Anemia     Benign hypertension     Procedure/Onset:  01/01/2005; Description: BP elevated today. Pt reports running out of BP meds 2wks ago. Will resume BP meds.    Diabetes mellitus (HCC)     Diabetes mellitus type 2 with complications, uncontrolled 9/8/2015    Dyspnea on exertion     Hyperlipidemia     Hypertension     Legally blind 2010    Miscarriage     x 3    Polymyalgia rheumatica (HCC) 11/24/2021    Seizures (HCC)     Sickle cell trait (HCC)     Stage 3a chronic kidney disease (HCC) 5/19/2022    Thyroid nodule 3/6/2020     Past Surgical History:   Procedure Laterality Date    CATARACT EXTRACTION Bilateral     august and september    EYE SURGERY      HYSTERECTOMY      37    OOPHORECTOMY Left     37    TX LIGATION/BIOPSY TEMPORAL ARTERY Bilateral 10/17/2019    Procedure: BIOPSY ARTERY TEMPORAL;  Surgeon: Bentley Reyes DO;  Location: [facility];  Service: Vascular    US GUIDED THYROID BIOPSY  5/13/2020     Family History   Problem Relation Age of Onset    Heart attack Mother     Heart disease Mother     Hypertension Mother     No Known Problems Father     Heart disease Sister     No Known Problems Sister     No Known Problems Sister     No Known Problems Daughter     Heart attack Maternal Grandmother     Heart disease Maternal Grandmother     No Known Problems Brother     No Known Problems Son     No Known Problems Son     No Known Problems Paternal Aunt     Diabetes Other     Heart attack Other     Cancer Neg Hx     Stroke Neg Hx       Social History     Socioeconomic History    Marital status: /Civil Union     Spouse name: None    Number of children: None    Years of education: None    Highest education level: None   Occupational History    Occupation: long term disability   Tobacco Use    Smoking status: Never    Smokeless tobacco: Never   Vaping Use    Vaping status: Never Used   Substance and Sexual Activity    Alcohol use: Never     Alcohol/week: 0.0 standard drinks of alcohol    Drug use: No    Sexual activity: Not Currently     Partners: Male      Birth control/protection: None   Other Topics Concern    None   Social History Narrative    Caffeine use    Resides with spouse and one son     Social Determinants of Health     Financial Resource Strain: High Risk (8/25/2023)    Received from [facility]    Overall Financial Resource Strain (CARDIA)     Difficulty of Paying Living Expenses: Very hard   Food Insecurity: Food Insecurity Present (9/26/2023)    Hunger Vital Sign     Worried About Running Out of Food in the Last Year: Often true     Ran Out of Food in the Last Year: Often true   Transportation Needs: Unmet Transportation Needs (9/26/2023)    PRAPARE - Transportation     Lack of Transportation (Medical): Yes     Lack of Transportation (Non-Medical): Yes   Physical Activity: Inactive (2/3/2020)    Exercise Vital Sign     Days of Exercise per Week: 0 days     Minutes of Exercise per Session: 0 min   Stress: No Stress Concern Present (2/3/2020)    [employer] - Occupational Stress Questionnaire     Feeling of Stress : Only a little   Social Connections: Unknown (2/3/2020)    Social Connection and Isolation Panel [NHANES]     Frequency of Communication with Friends and Family: Twice a week     Frequency of Social Gatherings with Friends and Family: Twice a week     Attends Christianity Services: Not on file     Active Member of Clubs or Organizations: Not on file     Attends Club or Organization Meetings: Not on file     Marital Status:    Intimate Partner Violence: Not At Risk (8/25/2023)    Received from [facility]    Humiliation, Afraid, Rape, and Kick questionnaire     Fear of Current or Ex-Partner: No     Emotionally Abused: No     Physically Abused: No     Sexually Abused: No   Housing Stability: High Risk (9/26/2023)    Housing Stability Vital Sign     Unable to Pay for Housing in the Last Year: Yes     Number of Places Lived in the Last Year: 3     Unstable Housing in the Last  Year: Yes        Current Outpatient Medications:     acetaminophen (TYLENOL) 325 mg tablet, Take 650 mg by mouth every 6 (six) hours as needed for mild pain, Disp: , Rfl:     albuterol (PROVENTIL HFA,VENTOLIN HFA) 90 mcg/act inhaler, Inhale 2 puffs every 6 (six) hours as needed for wheezing, Disp: , Rfl:     amLODIPine (NORVASC) 5 mg tablet, Take 1 tablet (5 mg total) by mouth daily, Disp: 30 tablet, Rfl: 3    apixaban (ELIQUIS) 5 mg, Take 1 tablet (5 mg total) by mouth 2 (two) times a day Do not start before August 17, 2023., Disp: , Rfl: 0    atorvastatin (LIPITOR) 40 mg tablet, Take 1 tablet (40 mg total) by mouth daily, Disp: 90 tablet, Rfl: 3    Blood Glucose Monitoring Suppl (OneTouch Verio Reflect) w/Device KIT, Use 1 each 4 (four) times a day (Patient not taking: Reported on 9/11/2023), Disp: 1 kit, Rfl: 0    Blood Pressure Monitor KIT, Check blood pressures BID (Patient not taking: Reported on 9/11/2023), Disp: 1 kit, Rfl: 0    cholecalciferol (VITAMIN D3) 1,000 units tablet, Take 2 tablets (2,000 Units total) by mouth daily, Disp: 60 tablet, Rfl: 2    collagenase (SANTYL) ointment, Apply 1 Application topically daily Apply to right distal plantar foot diabetic ulcer, apply to right proximal plantar foot diabetic ulcer, apply to left plantar foot diabetic ulcer, Disp: , Rfl:     Continuous Blood Gluc  (FreeStyle Denisa 2 [place]) ERIC, Use 1 each 4 (four) times a day (Patient not taking: Reported on 9/11/2023), Disp: 1 each, Rfl: 0    Continuous Blood Gluc Sensor (FreeStyle Denisa 2 Sensor) MISC, Use 1 each every 14 (fourteen) days (Patient not taking: Reported on 9/11/2023), Disp: 2 each, Rfl: 0    docusate sodium (COLACE) 100 mg capsule, Take 1 capsule (100 mg total) by mouth 2 (two) times a day, Disp: 60 capsule, Rfl: 0    Fluticasone-Salmeterol (Advair) 250-50 mcg/dose inhaler, Inhale 1 puff 2 (two) times a day Rinse mouth after use., Disp: , Rfl:     gabapentin (NEURONTIN) 100 mg capsule, Take 1  capsule (100 mg total) by mouth 2 (two) times a day, Disp: 60 capsule, Rfl: 3    hydrALAZINE (APRESOLINE) 10 mg tablet, Take 1 tablet (10 mg total) by mouth 3 (three) times a day, Disp: 90 tablet, Rfl: 3    insulin glargine (LANTUS) 100 units/mL subcutaneous injection, Inject 18 Units under the skin every 12 (twelve) hours (Patient taking differently: Inject 15 Units under the skin every 12 (twelve) hours), Disp: 10 mL, Rfl: 0    insulin lispro (HumaLOG KwikPen) 100 units/mL injection pen, Inject 5 Units under the skin 3 (three) times a day with meals, Disp: , Rfl:     Insulin Pen Needle (BD Pen Needle Tita 2nd Gen) 32G X 4 MM MISC, For use with insulin pen. Pharmacy may dispense brand covered by insurance. (Patient not taking: Reported on 9/11/2023), Disp: 100 each, Rfl: 0    Insulin Pen Needle (BD Pen Needle Tita U/F) 32G X 4 MM MISC, Use four times daily as directed with insulin pen (Patient not taking: Reported on 9/11/2023), Disp: 200 each, Rfl: 3    Lancets (OneTouch Delica Plus [id number]) MISC, Use 1 each 4 (four) times a day (Patient not taking: Reported on 9/11/2023), Disp: 200 each, Rfl: 2    loratadine (CLARITIN) 10 mg tablet, Take 10 mg by mouth daily, Disp: , Rfl:     losartan (COZAAR) 100 MG tablet, Take 1 tablet (100 mg total) by mouth daily, Disp: 30 tablet, Rfl: 5    Menthol, Topical Analgesic, (Biofreeze) 4 % GEL, Apply 1 Application topically 2 (two) times a day, Disp: , Rfl:     metFORMIN (GLUCOPHAGE) 1000 MG tablet, Take 1 tablet (1,000 mg total) by mouth 2 (two) times a day with meals, Disp: 60 tablet, Rfl: 3    metoprolol tartrate (LOPRESSOR) 100 mg tablet, Take 1 tablet (100 mg total) by mouth every 12 (twelve) hours, Disp: 60 tablet, Rfl: 3    multivitamin-iron-minerals-folic acid (THERAPEUTIC-M) TABS tablet, Take 1 tablet by mouth daily, Disp: 30 tablet, Rfl: 2    nystatin (MYCOSTATIN) powder, Apply topically 2 (two) times a day, Disp: , Rfl: 0    predniSONE 1 mg tablet, Take 4 tablets (4  mg total) by mouth daily, Disp: 120 tablet, Rfl: 0    topiramate (TOPAMAX) 100 mg tablet, Take 1 tablet (100 mg total) by mouth daily at bedtime (Patient taking differently: Take 100 mg by mouth daily at bedtime), Disp: 30 tablet, Rfl: 0    Review of Systems   Constitutional:  Negative for chills and fever.   Respiratory:  Negative for cough and shortness of breath.    Cardiovascular:  Negative for chest pain.   Gastrointestinal:  Negative for nausea and vomiting.   Skin:  Positive for wound.   Neurological:  Positive for numbness. Negative for weakness.       Objective:  /74   Pulse 64   Temp 97.5 °F (36.4 °C) (Tympanic)   Resp 18   Pain Score: 0-No pain     Physical Exam  Vitals and nursing note reviewed.   Constitutional:       General: She is not in acute distress.     Appearance: She is obese. She is not toxic-appearing.   Cardiovascular:      Rate and Rhythm: Normal rate and regular rhythm.      Pulses: Decreased pulses.           Dorsalis pedis pulses are detected w/ Doppler on the right side and detected w/ Doppler on the left side.        Posterior tibial pulses are detected w/ Doppler on the right side and detected w/ Doppler on the left side.      Comments: Biphasic signals bilateral DP and PTA.  Pulmonary:      Effort: Pulmonary effort is normal. No respiratory distress.   Musculoskeletal:         General: No tenderness or signs of injury.      Right lower leg: Edema present.      Left lower leg: Edema present.   Skin:     General: Skin is warm.      Capillary Refill: Capillary refill takes less than 2 seconds.      Coloration: Skin is not cyanotic or mottled.      Findings: Wound present. No abscess.      Nails: There is no clubbing.      Comments: Wounds in right submet 5 base and left submet 5 covered with superficial eschar.  On drainage.  No redness or signs of active infection.  See wound assessment and photo.   Neurological:      General: No focal deficit present.      Mental Status: She  is alert and oriented to person, place, and time.      Cranial Nerves: No cranial nerve deficit.      Sensory: Sensory deficit present.      Coordination: Coordination normal.   Psychiatric:         Mood and Affect: Mood normal.         Behavior: Behavior normal.         Thought Content: Thought content normal.         Judgment: Judgment normal.         Wound 09/11/23 Diabetic Ulcer Foot Left;Plantar (Active)   Wound Image Images linked 02/05/24 0910   Wound Description Epithelialization;Pink;Dry 02/05/24 0926   Soraya-wound Assessment Intact 02/05/24 0926   Wound Length (cm) 0.1 cm 02/05/24 0926   Wound Width (cm) 0.1 cm 02/05/24 0926   Wound Depth (cm) 0 cm 02/05/24 0926   Wound Surface Area (cm^2) 0.01 cm^2 02/05/24 0926   Wound Volume (cm^3) 0 cm^3 02/05/24 0926   Calculated Wound Volume (cm^3) 0 cm^3 02/05/24 0926   Drainage Amount None 02/05/24 0926       Wound 09/11/23 Diabetic Ulcer Foot Right;Plantar;Distal (Active)   Wound Image Images linked 02/05/24 0911   Wound Description Epithelialization;Pink 02/05/24 0925   Soraya-wound Assessment Intact 02/05/24 0925   Wound Length (cm) 0.1 cm 02/05/24 0925   Wound Width (cm) 0.1 cm 02/05/24 0925   Wound Depth (cm) 0 cm 02/05/24 0925   Wound Surface Area (cm^2) 0.01 cm^2 02/05/24 0925   Wound Volume (cm^3) 0 cm^3 02/05/24 0925   Calculated Wound Volume (cm^3) 0 cm^3 02/05/24 0925   Change in Wound Size % 100 02/05/24 0925   Drainage Amount None 02/05/24 0925         Procedures             Lab Results   Component Value Date    HGBA1C 10.2 (H) 08/24/2023       Wound Instructions:  Orders Placed This Encounter   Procedures    Wound cleansing and dressings     Bilateral foot wounds     May shower with protection only. Keep dressings clean,dry, and intact      R and left lateral plantar foot wounds :  Cover with Dry dressing for protection  Change Every other day and as needed for soilage or dislodgement.  Spandagrip size g to bilateral LE from base of toes to  knee    Patient may start bearing weight to both feet as tolerated.   Elevate both lower legs above heart level as much as you can as high as you can when sitting and during the night     Increase proteins to 3-4 servings of protein daily  Follow up in 3 weeks     Today's wound treatment note:  All wounds cleansed with normal saline & redressed as ordered above.     Standing Status:   Future     Standing Expiration Date:   2/5/2025             Natanael Carr DPM

## 2024-02-06 ENCOUNTER — NURSING HOME VISIT (OUTPATIENT)
Dept: GERIATRICS | Facility: OTHER | Age: 64
End: 2024-02-06
Payer: COMMERCIAL

## 2024-02-06 DIAGNOSIS — Z59.00 HOMELESSNESS: ICD-10-CM

## 2024-02-06 DIAGNOSIS — R56.9 SEIZURES (HCC): ICD-10-CM

## 2024-02-06 DIAGNOSIS — M35.3 PMR (POLYMYALGIA RHEUMATICA) (HCC): ICD-10-CM

## 2024-02-06 DIAGNOSIS — Z01.89 ENCOUNTER FOR COMPETENCY EVALUATION: ICD-10-CM

## 2024-02-06 DIAGNOSIS — E11.65 TYPE 2 DIABETES MELLITUS WITH HYPERGLYCEMIA, WITH LONG-TERM CURRENT USE OF INSULIN (HCC): ICD-10-CM

## 2024-02-06 DIAGNOSIS — H54.8 LEGALLY BLIND: ICD-10-CM

## 2024-02-06 DIAGNOSIS — E11.621 DIABETIC FOOT ULCER ASSOCIATED WITH TYPE 2 DIABETES MELLITUS, WITH FAT LAYER EXPOSED, UNSPECIFIED LATERALITY, UNSPECIFIED PART OF FOOT (HCC): ICD-10-CM

## 2024-02-06 DIAGNOSIS — I26.99 ACUTE PULMONARY EMBOLISM, UNSPECIFIED PULMONARY EMBOLISM TYPE, UNSPECIFIED WHETHER ACUTE COR PULMONALE PRESENT (HCC): ICD-10-CM

## 2024-02-06 DIAGNOSIS — Z71.89 ADVANCE CARE PLANNING: ICD-10-CM

## 2024-02-06 DIAGNOSIS — Z79.4 TYPE 2 DIABETES MELLITUS WITH HYPERGLYCEMIA, WITH LONG-TERM CURRENT USE OF INSULIN (HCC): ICD-10-CM

## 2024-02-06 DIAGNOSIS — L97.502 DIABETIC FOOT ULCER ASSOCIATED WITH TYPE 2 DIABETES MELLITUS, WITH FAT LAYER EXPOSED, UNSPECIFIED LATERALITY, UNSPECIFIED PART OF FOOT (HCC): ICD-10-CM

## 2024-02-06 DIAGNOSIS — R60.0 BILATERAL LOWER EXTREMITY EDEMA: Primary | ICD-10-CM

## 2024-02-06 DIAGNOSIS — I10 PRIMARY HYPERTENSION: ICD-10-CM

## 2024-02-06 DIAGNOSIS — D72.823 LEUKEMOID REACTION: ICD-10-CM

## 2024-02-06 DIAGNOSIS — R26.2 AMBULATORY DYSFUNCTION: ICD-10-CM

## 2024-02-06 DIAGNOSIS — E11.42 DIABETIC POLYNEUROPATHY ASSOCIATED WITH TYPE 2 DIABETES MELLITUS (HCC): ICD-10-CM

## 2024-02-06 PROCEDURE — 99310 SBSQ NF CARE HIGH MDM 45: CPT | Performed by: STUDENT IN AN ORGANIZED HEALTH CARE EDUCATION/TRAINING PROGRAM

## 2024-02-06 SDOH — ECONOMIC STABILITY - HOUSING INSECURITY: HOMELESSNESS UNSPECIFIED: Z59.00

## 2024-02-06 NOTE — ASSESSMENT & PLAN NOTE
Lab Results   Component Value Date    HGBA1C 10.2 (H) 08/24/2023     Stable per patient  Cotninue gabapentin

## 2024-02-06 NOTE — ASSESSMENT & PLAN NOTE
Monitor BP - generally stable 120s-150s systolic  Avoid hypotension  No acute cardiac complaints  Continue regimen including hydralazine 10mg TID, losartan 100mg, metoprolol tartrate 100mg BID, amlodipine 5mg

## 2024-02-06 NOTE — ASSESSMENT & PLAN NOTE
Legally blind per patient and per chart review, likely some component of diabetic retinopathy  Reports she is able to see sufficiently to read and do paperwork if she looks closely or uses corrective aid under good lighting  Encourage corrective aid  Follow with Ophthalmology as appropriate

## 2024-02-06 NOTE — ASSESSMENT & PLAN NOTE
Patient reports hx of homelessness prior to being in LTC  She is hopeful to continue exploring long term plan to perhaps move to her own place or with a friend she has in [place]  SW to continue follow for safe discharge planning/homecare services as appropriate if discharge from LTC becomes feasible in future

## 2024-02-06 NOTE — ASSESSMENT & PLAN NOTE
Seems chronic on lab review  Likely in setting of chronic prednisone  Monitor for signs of acute infection - no present signs  Monitor periodically on labs

## 2024-02-06 NOTE — PROGRESS NOTES
[facility]  Facility: [facility]    PROGRESS NOTE  Nursing Home Place of Service: nursing home place of service: POS 32 Unskilled- No Part A Coverage    NAME: Edie Salazar  : 1960 AGE: 63 y.o. SEX: female MRN: [id number]  DATE OF ENCOUNTER: 2024    Assessment and Plan     Problem List Items Addressed This Visit       Seizures ([facility])     Stable, no noted recent seizures  Continue topamax         PMR (polymyalgia rheumatica) ([facility])     Stable on prednisone  Following Rheumatology outpatient         Hypertension     Monitor BP - generally stable 120s-150s systolic  Avoid hypotension  No acute cardiac complaints  Continue regimen including hydralazine 10mg TID, losartan 100mg, metoprolol tartrate 100mg BID, amlodipine 5mg         Diabetic neuropathy ([facility])       Lab Results   Component Value Date    HGBA1C 10.2 (H) 2023     Stable per patient  Cotninue gabapentin         Bilateral lower extremity edema - Primary     strike-out   2024 10:50 270.2 Lbs Standing lkrum (Manual)    strike-out   2024 11:24 269.8 Lbs Standing smelo (Manual)    strike-out   2024 11:23 270.0 Lbs Standing bsheriff (Manual)    strike-out   2024 11:47 270.4 Lbs Standing snolasco1 (Manual)    strike-out   2024 14:55 269.5 Lbs Standing jrothwell (Manual)    strike-out   2024 14:49 270.0 Lbs Standing pshafnisky (Manual)        Has grade 1 diastolic dysfunction noted on echo  Monitor routine weight - fairly stable, no alarming uptrend  Monitor volume status clinically - has some peripheral edema but seemingly stable, no orthopnea or respiratory symptoms or evidence of fluid overload otherwise clinically  Encourage compression, elevation  Not presently on diuretic; if edema worsens could consider addition of diuretic or for example changing her BP regimen to include HCTZ if renal function allows  Continue to monitor           Type 2 diabetes mellitus with hyperglycemia ([facility])       Lab  "Results   Component Value Date    HGBA1C 10.2 (H) 08/24/2023     Most recent A1c 12/28/23 improved to 7.0  Monitor glucose - generally maintained in mid-100s, rarely lower or higher  Avoid hypoglycemia  Continue regimen including metformin 500mg BID, humalog 5u TIDAC, lantus 15u BID  Consider weaning regimen if sugars remain well controlled or concern for hypoglycemia         Ambulatory dysfunction     -PT/OT  -fall precautions  -encourage appropriate DME use           Legally blind     Legally blind per patient and per chart review, likely some component of diabetic retinopathy  Reports she is able to see sufficiently to read and do paperwork if she looks closely or uses corrective aid under good lighting  Encourage corrective aid  Follow with Ophthalmology as appropriate         Leukocytosis     Seems chronic on lab review  Likely in setting of chronic prednisone  Monitor for signs of acute infection - no present signs  Monitor periodically on labs         Homelessness     Patient reports hx of homelessness prior to being in LTC  She is hopeful to continue exploring long term plan to perhaps move to her own place or with a friend she has in [place]  SW to continue follow for safe discharge planning/homecare services as appropriate if discharge from LTC becomes feasible in future           Pulmonary embolism (HCC)     Hx of PE and DVT in Aug 2023  Continue Eliquis         Diabetic foot ulcer (HCC)       Lab Results   Component Value Date    HGBA1C 10.2 (H) 08/24/2023       Podiatry and wound care as appropriate  Patient feels wounds are improving with wound care  Continue to monitor         Advance care planning     Patient verbalizes primary HCP at this time as her brother René Escalante (spends some time in [place] and some time in the ) and reports secondary would be her son Ronnie Salazar but concern that her son is \"not reliable\" always  Extensive discussion/education with patient today regarding their wishes " "with respect to resuscitative measures; discussed potential risks vs benefits of resuscitative measures; patient verbalizes understanding, appears to have capacity to make this decision presently, and clearly verbalizes a desire for Full Code, consistent with prior         Encounter for competency evaluation     requested by staff and NP to see patient for competency evaluation as part of visit for \"Medical Source Opinion of Patient's Capability to Manage Benefits\" social security paperwork as part of today's visit   Extensive discussion with patient   Patient is Aox3 and appears to have reasonable/good insight into present situation and history   Patient self-reports she has good ability to comprehend her finances and good ability to personally manage or direct management of her funds  and that she would like to continue to do so  UMS questionnaire done with patient today - patient reports having completed high school - score of 27/30 - consistent with intact cognition - patient at baseline mentation per nursing   No noted dementia/MCI history  Paperwork completed to the effect that patient does appear to have competency to independently manage or direct her funds - paperwork given to staff                 Chief Complaint     Acute visit - asked by staff and NP to follow up on patient including peripheral edema and also to complete competency evaluation regarding finances    History of Present Illness     Edie Salazar is a 63 y.o. female who was seen today for follow up.     Patient seen and examined in room  Others present: none  Patient seated in chair  Appears comfortable, awake, alert, oriented to situation, able to converse appropriately  Patient polite, Aox3, appears in good spirits. Notes she feels well overall with no acute complaints. Feels her peripheral edema is stable and denies orthopnea. Notes bilateral diabetic foot wounds have been healing/improving with wound care and not presently bothering " "her. Appetite good, no swallowing concerns, no abd pain/N/V, last BM today and having daily BM recently; notes some mild chronic intermittent stable low back pain worse on waking up but resolves shortly and feels better positionally; no other acute pain anywhere. Gets around with walker or wheelchair and denies recent falls. Reports being \"legally blind\" but able to see sufficiently to read and do paperwork if she looks closely or uses corrective aid under good lighting and presently has some mail/paperwork with her she has been reviewing in room. Seems to be a reasonable/good historian and with reasonable insight into her present situation. She reports valuing her independence and reports she is able to do a good amount of ADLs on her own including changing clothes.  No further questions or acute concerns identified.    Lab Review:   11/13/23: CBC with Hb 10.6 (recent baseline seems around 10-11 from available data), WBC 12.3 (seems to be somewhat chronically elevated on available prior labs); CMP generally non-actionable, eGFR 73; Mg 1.9; iron 33; B12 >1000; vit D WNL  12/28/23: A1c 7.0        Echo Aug 2023: EF 55, normal systolic func, grade 1 diastolic dysfunction, mild-mod TR      Social hx: Patient reports she was previously homeless. She is presently in LTC at this facility but expresses a hope to eventually be able to move to live with a friend in [place].     The following portions of the patient's history were reviewed and updated as appropriate: allergies, current medications, past family history, past medical history, past social history, past surgical history and problem list.    Review of Systems     Review of Systems   Constitutional:  Negative for appetite change, chills, diaphoresis and fever.   HENT:  Negative for drooling, ear pain, hearing loss, rhinorrhea and sore throat.    Eyes:  Negative for pain, discharge, redness, itching and visual disturbance (baseline visual impairment, reports being " legally blind).   Respiratory:  Negative for cough, chest tightness, shortness of breath and wheezing.    Cardiovascular:  Negative for chest pain, palpitations and leg swelling.   Gastrointestinal:  Negative for abdominal pain, blood in stool, constipation, diarrhea, nausea and vomiting.   Genitourinary:  Negative for difficulty urinating, dysuria and hematuria.   Musculoskeletal:  Positive for arthralgias and gait problem.   Skin:  Positive for wound (bilateral feet, diabetic foot wounds, doing better). Negative for color change.   Neurological:  Negative for dizziness, facial asymmetry, speech difficulty and headaches.   Psychiatric/Behavioral:  Negative for agitation, behavioral problems and confusion. The patient is not nervous/anxious and is not hyperactive.    All other systems reviewed and are negative.      Active Problem List     Patient Active Problem List   Diagnosis    Uncontrolled type 2 diabetes mellitus with hyperglycemia ([facility])    Seizures ([facility])    Exertional dyspnea    Enlarged pulmonary artery (HCC)    Aortic dilatation (HCC)    Anemia    Decreased pulses in feet    Hyperlipidemia    Microalbuminuria    Neuropathy of hand, right    QT prolongation    Visual impairment in both eyes    Vitamin D deficiency    Lung nodules    Orthostatic hypotension    Mild intermittent asthma without complication    Type 2 diabetes mellitus with microalbuminuria, with long-term current use of insulin (HCC)    Other inflammatory and immune myopathies, not elsewhere classified    Morbid obesity (HCC)    Polyneuropathy associated with underlying disease (HCC)    PMR (polymyalgia rheumatica) ([facility])    Thrombocytosis    Left cavernous carotid aneurysm    Type 2 diabetes mellitus with hyperglycemia, with long-term current use of insulin ([facility])    Aneurysm (HCC)    Thyroid nodule    History of absence seizures    Hypersomnia    Cerebral aneurysm without rupture    Chronic migraine without aura without status migrainosus, not  intractable    Hypertension    Depressed mood    Blurry vision, bilateral    Ulnar neuropathy of right upper extremity    Polymyalgia rheumatica (HCC)    Gait instability    Stage 3a chronic kidney disease (HCC)    Obstructive sleep apnea    Diabetic neuropathy (HCC)    Unsteadiness on feet    Abnormal urinalysis    Weakness    Intertrigo    Leg numbness    Unsheltered homelessness    Bilateral lower extremity edema    Diarrhea    Dyslipidemia    Insulin long-term use (HCC)    Type 2 diabetes mellitus with hyperglycemia (HCC)    Ambulatory dysfunction    Legally blind    Suspected pressure injury of deep tissue    Calculus of gallbladder without cholecystitis without obstruction    Diabetes mellitus (HCC)    Abnormal CT scan    Friction blisters of the soles    Leukocytosis    Encounter for support and coordination of transition of care    Food insecurity    Homelessness    Ulcer of left heel (HCC)    Pulmonary embolism (HCC)    Diabetic foot ulcer (HCC)    Cellulitis of lower extremity    Chronic anemia    Advance care planning    Encounter for competency evaluation       Objective     Vital Signs:     BP: 160/88 mmHg  2/2/2024 17:02 Temp:98 °F  12/5/2023 10:45 Pulse:80 bpm  2/2/2024 08:36 Weight:270.2 Lbs  2/6/2024 10:50   Resp:18 Breaths/min  12/5/2023 10:45 BS:120 mg/dL  2/6/2024 08:27 O2:97 %  12/5/2023 10:45 Pain:0  2/6/2024 17:02       Physical Exam  Vitals reviewed.   Constitutional:       General: She is not in acute distress.     Appearance: She is obese. She is not toxic-appearing or diaphoretic.   HENT:      Head: Normocephalic and atraumatic.      Right Ear: External ear normal.      Left Ear: External ear normal.      Nose: Nose normal. No rhinorrhea.      Mouth/Throat:      Mouth: Mucous membranes are moist.      Pharynx: Oropharynx is clear. No posterior oropharyngeal erythema.   Eyes:      General: No scleral icterus.        Right eye: No discharge.         Left eye: No discharge.      Extraocular  Movements: Extraocular movements intact.      Conjunctiva/sclera: Conjunctivae normal.      Pupils: Pupils are equal, round, and reactive to light.   Cardiovascular:      Rate and Rhythm: Normal rate and regular rhythm.   Pulmonary:      Effort: Pulmonary effort is normal. No respiratory distress.      Breath sounds: Normal breath sounds. No wheezing or rales.   Abdominal:      General: Bowel sounds are normal.      Palpations: Abdomen is soft.      Tenderness: There is no abdominal tenderness. There is no guarding.   Musculoskeletal:         General: No tenderness.      Cervical back: No rigidity.      Comments: 1-2+ bilateral lower extremity edema (chronic/stable per patient)   Skin:     General: Skin is warm and dry.      Coloration: Skin is not jaundiced.   Neurological:      Mental Status: She is alert and oriented to person, place, and time. Mental status is at baseline.   Psychiatric:         Mood and Affect: Mood normal.         Behavior: Behavior normal.         Thought Content: Thought content normal.         Judgment: Judgment normal.         Pertinent Laboratory/Diagnostic Studies:  Laboratory and Imaging studies reviewed. Full report in the paper chart.     Current Medications   Medications reviewed and updated in facility chart.      -Total time spent on this encounter today including documentation and workup review, face to face time, history and exam, and documentation/orders and competency eval was approximately 55 minutes.  -This note will be copied to [facility] EMR where vitals and medication orders are placed.    Fidel Harris D.O.  Geriatric Medicine  2/6/2024 7:05 PM

## 2024-02-06 NOTE — ASSESSMENT & PLAN NOTE
Lab Results   Component Value Date    HGBA1C 10.2 (H) 08/24/2023     Most recent A1c 12/28/23 improved to 7.0  Monitor glucose - generally maintained in mid-100s, rarely lower or higher  Avoid hypoglycemia  Continue regimen including metformin 500mg BID, humalog 5u TIDAC, lantus 15u BID  Consider weaning regimen if sugars remain well controlled or concern for hypoglycemia

## 2024-02-06 NOTE — ASSESSMENT & PLAN NOTE
Lab Results   Component Value Date    HGBA1C 10.2 (H) 08/24/2023       Podiatry and wound care as appropriate  Patient feels wounds are improving with wound care  Continue to monitor

## 2024-02-06 NOTE — ASSESSMENT & PLAN NOTE
"Patient verbalizes primary HCP at this time as her brother René Escalante (spends some time in [place] and some time in the [place]) and reports secondary would be her son Ronnie Salazar but concern that her son is \"not reliable\" always  Extensive discussion/education with patient today regarding their wishes with respect to resuscitative measures; discussed potential risks vs benefits of resuscitative measures; patient verbalizes understanding, appears to have capacity to make this decision presently, and clearly verbalizes a desire for Full Code, consistent with prior  "

## 2024-02-07 NOTE — ASSESSMENT & PLAN NOTE
"requested by staff and NP to see patient for competency evaluation as part of visit for \"Medical Source Opinion of Patient's Capability to Manage Benefits\" social security paperwork as part of today's visit   Extensive discussion with patient   Patient is Aox3 and appears to have reasonable/good insight into present situation and history   Patient self-reports she has good ability to comprehend her finances and good ability to personally manage or direct management of her funds  and that she would like to continue to do so  MARY LOUUMS questionnaire done with patient today - patient reports having completed high school - score of 27/30 - consistent with intact cognition - patient at baseline mentation per nursing   No noted dementia/MCI history  Paperwork completed to the effect that patient does appear to have competency to independently manage or direct her funds - paperwork given to staff   "

## 2024-02-23 ENCOUNTER — TELEPHONE (OUTPATIENT)
Dept: FAMILY MEDICINE CLINIC | Facility: CLINIC | Age: 64
End: 2024-02-23

## 2024-02-23 NOTE — TELEPHONE ENCOUNTER
PCP SIGNATURE NEEDED FOR [facility]  FORM RECEIVED VIA FAX AND PLACED IN PCP FOLDER TO BE DELIVERED AT ASSIGNED TIMES.

## 2024-02-26 ENCOUNTER — NURSING HOME VISIT (OUTPATIENT)
Dept: GERIATRICS | Facility: OTHER | Age: 64
End: 2024-02-26
Payer: COMMERCIAL

## 2024-02-26 DIAGNOSIS — L97.502 DIABETIC FOOT ULCER ASSOCIATED WITH TYPE 2 DIABETES MELLITUS, WITH FAT LAYER EXPOSED, UNSPECIFIED LATERALITY, UNSPECIFIED PART OF FOOT (HCC): ICD-10-CM

## 2024-02-26 DIAGNOSIS — Z79.4 TYPE 2 DIABETES MELLITUS WITH HYPERGLYCEMIA, WITH LONG-TERM CURRENT USE OF INSULIN (HCC): Primary | ICD-10-CM

## 2024-02-26 DIAGNOSIS — I10 PRIMARY HYPERTENSION: ICD-10-CM

## 2024-02-26 DIAGNOSIS — E11.65 TYPE 2 DIABETES MELLITUS WITH HYPERGLYCEMIA, WITH LONG-TERM CURRENT USE OF INSULIN (HCC): Primary | ICD-10-CM

## 2024-02-26 DIAGNOSIS — E11.621 DIABETIC FOOT ULCER ASSOCIATED WITH TYPE 2 DIABETES MELLITUS, WITH FAT LAYER EXPOSED, UNSPECIFIED LATERALITY, UNSPECIFIED PART OF FOOT (HCC): ICD-10-CM

## 2024-02-26 DIAGNOSIS — R26.2 AMBULATORY DYSFUNCTION: ICD-10-CM

## 2024-02-26 DIAGNOSIS — J45.20 MILD INTERMITTENT ASTHMA WITHOUT COMPLICATION: ICD-10-CM

## 2024-02-26 DIAGNOSIS — E11.42 DIABETIC POLYNEUROPATHY ASSOCIATED WITH TYPE 2 DIABETES MELLITUS (HCC): ICD-10-CM

## 2024-02-26 PROCEDURE — 99309 SBSQ NF CARE MODERATE MDM 30: CPT

## 2024-02-27 VITALS
HEART RATE: 66 BPM | SYSTOLIC BLOOD PRESSURE: 166 MMHG | WEIGHT: 278.4 LBS | DIASTOLIC BLOOD PRESSURE: 80 MMHG | RESPIRATION RATE: 18 BRPM | TEMPERATURE: 98 F | OXYGEN SATURATION: 97 % | BODY MASS INDEX: 42.33 KG/M2

## 2024-02-27 PROBLEM — S90.829A: Status: RESOLVED | Noted: 2023-05-08 | Resolved: 2024-02-27

## 2024-02-27 NOTE — PROGRESS NOTES
[address], [address], [place] [address]  [phone]    NAME: Edie Salazar  AGE: 63 y.o. SEX: female    Progress Note    Location: [facility]  POS: 32 ([facility])  Code Status: Full Code    Chief complaint / Reason for visit:  Follow-up visit    Assessment/Plan:    Ambulatory dysfunction  Cleared by podiatry to be WBAT  Continue PT/OT  Encourage use of assistive device  Continue to use wheelchair  Goal to use walker  Continue fall precautions  Encourage patient to participate with activities  Provide education for patient, family, and caregivers    Hypertension  Encourage medication adherence   Continue daily blood pressure checks  Encourage heart healthy diet  Continue hydralazine 10 mg po TID  Continue losartan 100 mg po daily  Continue amlodipine 5 mg po daily  Continue metoprolol tartrate 100 mg Q12 hours  Blood pressures stable    Mild intermittent asthma without complication  Non-compliance with Advair inhaler  Has refused and states she does not require  No shortness of breath, not in exacerbation  Continue albuterol inhaler Q6h prn     Diabetic foot ulcer (HCC)  Follows with [facility]  Recent change of weight bearing status to WBAT  Discussing with nursing staff to be fitted for diabetic shoes  Continue wound care follow up    Diabetic neuropathy (HCC)  Stable at this time  Has resulted in diabetic foot ulcers  Need to ensure proper footwear when cleared by podiatry/wound care  Continue gabapentin 100 mg po BID  Continue education to resident with checking feet daily    Type 2 diabetes mellitus with hyperglycemia, with long-term current use of insulin (HCC)  Recent HgA1c on 12/28/23 noted to be 7.0  More controlled   Encourage CCD  Avoid hypoglycemia  Hypoglycemia protocol  Continue metformin 1000 mg po BID with meals  Continue insulin glargine 15 units SQ Q12 hours  Continue insulin lispro 5 units SQ TID with meals      This is a 63 y.o.  female seen today at [facility].  Medical history includes, not limited to, type 2 DM, polymyalgia rheumatica, CKD stage 3A, hypertension, hyperlipidemia, and vitamin D deficiency.    Resident is seen today for a routine follow up visit.  Upon entering resident's room she is out of bed in her wheelchair.  She is alert and oriented x 3, able to answer all questions appropriately.  Currently denies pain.  States her wounds are progressing and healing well.  She recently had a follow up appointment with podiatry/wound care and is now able to be WBAT on her feet.  She had questions regarding being fitted for diabetic shoes.  Her goal is to be able to move into an apartment when her ambulatory status improves.      Spoke with nursing staff regarding resident.  No concerns at this time.           Nursing and prior provider notes reviewed on this visit. Discussed visit with PCP and nursing staff/ supervisor.      Review of Systems   Constitutional:  Positive for activity change. Negative for appetite change, fatigue, fever and unexpected weight change.   HENT:  Negative for congestion.    Eyes:  Negative for pain, discharge and visual disturbance.        Eyeglasses   Respiratory:  Negative for cough and shortness of breath.    Cardiovascular:  Negative for chest pain and palpitations.   Gastrointestinal:  Negative for abdominal pain, constipation and diarrhea.   Genitourinary:  Negative for difficulty urinating, dysuria, hematuria and urgency.   Musculoskeletal:  Positive for arthralgias and gait problem.   Skin:  Positive for wound (bilateral feet, diabetic ulcers). Negative for color change.   Neurological:  Negative for syncope and weakness.   Psychiatric/Behavioral:  Negative for confusion and sleep disturbance.    All other systems reviewed and are negative.         ALLERGY: Reviewed, unchanged  No Known Allergies    HISTORY:  Medical Hx: Reviewed, unchanged  Family Hx: Reviewed, unchanged  Soc Hx: Reviewed,   unchanged      Physical Exam  Vitals and nursing note reviewed.   Constitutional:       General: She is not in acute distress.     Appearance: Normal appearance. She is not ill-appearing.   HENT:      Head: Normocephalic.      Right Ear: Tympanic membrane normal.      Left Ear: Tympanic membrane normal.      Nose: Nose normal. No congestion.      Mouth/Throat:      Mouth: Mucous membranes are moist.      Pharynx: Oropharynx is clear.   Eyes:      General:         Right eye: No discharge.         Left eye: No discharge.      Extraocular Movements: Extraocular movements intact.      Conjunctiva/sclera: Conjunctivae normal.      Pupils: Pupils are equal, round, and reactive to light.   Cardiovascular:      Rate and Rhythm: Normal rate and regular rhythm.      Heart sounds: Normal heart sounds.      Comments: Decreased pedal pulses  Pulmonary:      Effort: Pulmonary effort is normal. No respiratory distress.      Breath sounds: Normal breath sounds.   Chest:      Chest wall: No tenderness.   Abdominal:      General: Bowel sounds are normal.      Palpations: Abdomen is soft.      Tenderness: There is no abdominal tenderness.   Musculoskeletal:         General: No swelling. Normal range of motion.      Cervical back: Normal range of motion.      Right lower leg: Edema present.      Left lower leg: Edema present.   Skin:     General: Skin is warm and dry.   Neurological:      Mental Status: She is alert and oriented to person, place, and time. Mental status is at baseline.      Motor: No weakness.   Psychiatric:         Mood and Affect: Mood normal.         Behavior: Behavior normal.         Thought Content: Thought content normal.         Judgment: Judgment normal.            Laboratory results / Imaging reviewed: Hard copy/ies in medical chart:    Vitals:    02/27/24 1755   BP: 166/80   Pulse: 66   Resp: 18   Temp: 98 °F (36.7 °C)   SpO2: 97%       Current Medications:  All medications reviewed and updated in Nursing  "Home Chart    Please note: This note was completed in part utilizing a voice-recognition software may have been used in the preparation of this document. Grammatical errors, random word insertion, spelling mistakes, and incomplete sentences may be an occasional consequence of the system secondary to software limitations, ambient noise and hardware issues. Occasional wrong word or \"sound-alike\" substitutions may have occurred due to the inherent limitations of voice recognition software. At the time of dictation, efforts were made to edit, clarify and/or correct errors. Interpretation should be guided by context. Please read the chart carefully and recognize, using context, where substitutions have occurred. If you have any questions or concerns about the context, text or information contained within the body of this dictation, please contact myself, the provider, for further clarification.      CONCETTA Jimenez  2/27/2024  "

## 2024-02-27 NOTE — ASSESSMENT & PLAN NOTE
Cleared by podiatry to be WBAT  Continue PT/OT  Encourage use of assistive device  Continue to use wheelchair  Goal to use walker  Continue fall precautions  Encourage patient to participate with activities  Provide education for patient, family, and caregivers

## 2024-02-27 NOTE — ASSESSMENT & PLAN NOTE
Stable at this time  Has resulted in diabetic foot ulcers  Need to ensure proper footwear when cleared by podiatry/wound care  Continue gabapentin 100 mg po BID  Continue education to resident with checking feet daily

## 2024-02-27 NOTE — ASSESSMENT & PLAN NOTE
Non-compliance with Advair inhaler  Has refused and states she does not require  No shortness of breath, not in exacerbation  Continue albuterol inhaler Q6h prn

## 2024-02-27 NOTE — ASSESSMENT & PLAN NOTE
Follows with [facility]  Recent change of weight bearing status to WBAT  Discussing with nursing staff to be fitted for diabetic shoes  Continue wound care follow up

## 2024-02-28 ENCOUNTER — NURSING HOME VISIT (OUTPATIENT)
Dept: GERIATRICS | Facility: OTHER | Age: 64
End: 2024-02-28
Payer: COMMERCIAL

## 2024-02-28 ENCOUNTER — OFFICE VISIT (OUTPATIENT)
Dept: WOUND CARE | Facility: CLINIC | Age: 64
End: 2024-02-28
Payer: COMMERCIAL

## 2024-02-28 VITALS
SYSTOLIC BLOOD PRESSURE: 118 MMHG | DIASTOLIC BLOOD PRESSURE: 76 MMHG | TEMPERATURE: 98.4 F | RESPIRATION RATE: 18 BRPM | HEART RATE: 64 BPM

## 2024-02-28 DIAGNOSIS — L97.412 ULCER OF RIGHT MIDFOOT WITH FAT LAYER EXPOSED (HCC): Primary | ICD-10-CM

## 2024-02-28 DIAGNOSIS — Z79.4 TYPE 2 DIABETES MELLITUS WITH DIABETIC NEUROPATHY, WITH LONG-TERM CURRENT USE OF INSULIN (HCC): ICD-10-CM

## 2024-02-28 DIAGNOSIS — Z91.89 AT RISK FOR FOOT PROBLEM: ICD-10-CM

## 2024-02-28 DIAGNOSIS — L97.502 DIABETIC FOOT ULCER ASSOCIATED WITH TYPE 2 DIABETES MELLITUS, WITH FAT LAYER EXPOSED, UNSPECIFIED LATERALITY, UNSPECIFIED PART OF FOOT (HCC): ICD-10-CM

## 2024-02-28 DIAGNOSIS — Z79.4 TYPE 2 DIABETES MELLITUS WITH HYPERGLYCEMIA, WITH LONG-TERM CURRENT USE OF INSULIN (HCC): Primary | ICD-10-CM

## 2024-02-28 DIAGNOSIS — E11.40 TYPE 2 DIABETES MELLITUS WITH DIABETIC NEUROPATHY, WITH LONG-TERM CURRENT USE OF INSULIN (HCC): ICD-10-CM

## 2024-02-28 DIAGNOSIS — E11.65 TYPE 2 DIABETES MELLITUS WITH HYPERGLYCEMIA, WITH LONG-TERM CURRENT USE OF INSULIN (HCC): Primary | ICD-10-CM

## 2024-02-28 DIAGNOSIS — E11.621 DIABETIC FOOT ULCER ASSOCIATED WITH TYPE 2 DIABETES MELLITUS, WITH FAT LAYER EXPOSED, UNSPECIFIED LATERALITY, UNSPECIFIED PART OF FOOT (HCC): ICD-10-CM

## 2024-02-28 DIAGNOSIS — L97.522 ULCER OF LEFT FOOT, WITH FAT LAYER EXPOSED (HCC): ICD-10-CM

## 2024-02-28 PROCEDURE — 99308 SBSQ NF CARE LOW MDM 20: CPT

## 2024-02-28 PROCEDURE — 99212 OFFICE O/P EST SF 10 MIN: CPT | Performed by: FAMILY MEDICINE

## 2024-02-28 NOTE — PATIENT INSTRUCTIONS
Orders Placed This Encounter   Procedures    Wound cleansing and dressings     Your wounds are healed. Continue good daily skin care and moisturize daily. Monitor your feet daily and especially after any excessive time being on your feet.   Follow up with a podiatrist ---referral was placed today at wound care center  Thank you for choosing [facility].  Call wound care center if you notice any drainage or open wounds     Standing Status:   Future     Standing Expiration Date:   2/28/2025    Ambulatory Referral to Podiatry     Standing Status:   Future     Standing Expiration Date:   2/28/2025     Referral Priority:   Routine     Referral Type:   Consult - AMB     Referral Reason:   Specialty Services Required     Referred to Provider:   Natanael Carr DPM     Requested Specialty:   Podiatry     Number of Visits Requested:   1     Expiration Date:   2/28/2025       Orders Placed This Encounter   Procedures    Wound cleansing and dressings     Your wounds are healed. Continue good daily skin care and moisturize daily. Monitor your feet daily and especially after any excessive time being on your feet.   Follow up with a podiatrist ---referral was placed today at wound care center  Thank you for choosing [facility].  Call wound care center if you notice any drainage or open wounds     Standing Status:   Future     Standing Expiration Date:   2/28/2025    Ambulatory Referral to Podiatry     Standing Status:   Future     Standing Expiration Date:   2/28/2025     Referral Priority:   Routine     Referral Type:   Consult - AMB     Referral Reason:   Specialty Services Required     Referred to Provider:   Natanael Carr DPM     Requested Specialty:   Podiatry     Number of Visits Requested:   1     Expiration Date:   2/28/2025

## 2024-02-28 NOTE — PROGRESS NOTES
"Patient ID: Edie Salazar is a 63 y.o. female Date of Birth 1960       Chief Complaint   Patient presents with   • Follow Up Wound Care Visit     Bilateral feet wounds       Allergies:  Patient has no known allergies.    Diagnosis:      Diagnosis ICD-10-CM Associated Orders   1. Ulcer of right midfoot with fat layer exposed ([facility])  L97.412 Wound cleansing and dressings      2. Ulcer of left foot, with fat layer exposed ([facility])  L97.522 Wound cleansing and dressings      3. Type 2 diabetes mellitus with diabetic neuropathy, with long-term current use of insulin ([facility])  E11.40     Z79.4       4. At risk for foot problem  Z91.89 Ambulatory Referral to Podiatry                Assessment & Plan:  Healed/completely epithelialized wounds in right submet 5 base and left submet 5.  Some superficial hyperkeratotic debris that can be easily flaked off revealing epithelialization beneath.    Recommended close follow-up with podiatry as outpatient, she reports she has not yet seen outpatient podiatrist.  Referral placed.  Has been following with Dr. Carr in wound center thus far.  Recommend close regular/daily foot inspections and monitoring for any breaks in skin.  She is to immediately call wound center should she notice any breaks in her skin.  T2DM:  A1C results reviewed with the patient today.  Though per chart review, last A1c in August, reports she had recent A1c of 7.  Emphasized importance of continued diabetic control, she expressed understanding.  Follow-up as needed       Portions of the record may have been created with voice recognition software. Occasional wrong word or \"sound alike\" substitutions may have occurred due to the inherent limitations of voice recognition software. Read the chart carefully and recognize, using context, where substitutions have occurred.    Subjective:   Edie is a very pleasant 63-year-old female with a past medical history including but not limited to type 2 diabetes who has followed " with Dr. Carr in wound center for bilateral diabetic foot ulcerations.  She presents today for follow-up.  She states she has no acute complaints today and denies fever, chills.  Reports lower extremity edema which is chronic for her.        The following portions of the patient's history were reviewed and updated as appropriate:   Patient Active Problem List   Diagnosis   • Uncontrolled type 2 diabetes mellitus with hyperglycemia ([facility])   • Seizures ([facility])   • Exertional dyspnea   • Enlarged pulmonary artery ([facility])   • Aortic dilatation ([facility])   • Anemia   • Decreased pulses in feet   • Hyperlipidemia   • Microalbuminuria   • Neuropathy of hand, right   • QT prolongation   • Visual impairment in both eyes   • Vitamin D deficiency   • Lung nodules   • Orthostatic hypotension   • Mild intermittent asthma without complication   • Type 2 diabetes mellitus with microalbuminuria, with long-term current use of insulin ([facility])   • Other inflammatory and immune myopathies, not elsewhere classified   • Morbid obesity ([facility])   • Polyneuropathy associated with underlying disease ([facility])   • PMR (polymyalgia rheumatica) ([facility])   • Thrombocytosis   • Left cavernous carotid aneurysm   • Type 2 diabetes mellitus with hyperglycemia, with long-term current use of insulin ([facility])   • Aneurysm ([facility])   • Thyroid nodule   • History of absence seizures   • Hypersomnia   • Cerebral aneurysm without rupture   • Chronic migraine without aura without status migrainosus, not intractable   • Hypertension   • Depressed mood   • Blurry vision, bilateral   • Ulnar neuropathy of right upper extremity   • Polymyalgia rheumatica ([facility])   • Gait instability   • Stage 3a chronic kidney disease ([facility])   • Obstructive sleep apnea   • Diabetic neuropathy ([facility])   • Unsteadiness on feet   • Abnormal urinalysis   • Weakness   • Intertrigo   • Leg numbness   • Unsheltered homelessness   • Bilateral lower extremity edema   • Diarrhea   • Dyslipidemia   • Insulin long-term  use ([facility])   • Type 2 diabetes mellitus with hyperglycemia ([facility])   • Ambulatory dysfunction   • Legally blind   • Suspected pressure injury of deep tissue   • Calculus of gallbladder without cholecystitis without obstruction   • Diabetes mellitus ([facility])   • Abnormal CT scan   • Leukocytosis   • Encounter for support and coordination of transition of care   • Food insecurity   • Homelessness   • Ulcer of left heel ([facility])   • Pulmonary embolism ([facility])   • Diabetic foot ulcer ([facility])   • Cellulitis of lower extremity   • Chronic anemia   • Advance care planning   • Encounter for competency evaluation     Past Medical History:   Diagnosis Date   • Anemia    • Benign hypertension     Procedure/Onset: 01/01/2005; Description: BP elevated today. Pt reports running out of BP meds 2wks ago. Will resume BP meds.   • Diabetes mellitus ([facility])    • Diabetes mellitus type 2 with complications, uncontrolled 9/8/2015   • Dyspnea on exertion    • Hyperlipidemia    • Hypertension    • Legally blind 2010   • Miscarriage     x 3   • Polymyalgia rheumatica ([facility]) 11/24/2021   • Seizures ([facility])    • Sickle cell trait ([facility])    • Stage 3a chronic kidney disease ([facility]) 5/19/2022   • Thyroid nodule 3/6/2020     Past Surgical History:   Procedure Laterality Date   • CATARACT EXTRACTION Bilateral     august and september   • EYE SURGERY     • HYSTERECTOMY      37   • OOPHORECTOMY Left     37   • AR LIGATION/BIOPSY TEMPORAL ARTERY Bilateral 10/17/2019    Procedure: BIOPSY ARTERY TEMPORAL;  Surgeon: Bentley Reyes DO;  Location: [facility];  Service: Vascular   • US GUIDED THYROID BIOPSY  5/13/2020     Family History   Problem Relation Age of Onset   • Heart attack Mother    • Heart disease Mother    • Hypertension Mother    • No Known Problems Father    • Heart disease Sister    • No Known Problems Sister    • No Known Problems Sister    • No Known Problems Daughter    • Heart attack Maternal Grandmother    • Heart disease Maternal Grandmother    •  No Known Problems Brother    • No Known Problems Son    • No Known Problems Son    • No Known Problems Paternal Aunt    • Diabetes Other    • Heart attack Other    • Cancer Neg Hx    • Stroke Neg Hx       Social History     Socioeconomic History   • Marital status: /Civil Union     Spouse name: None   • Number of children: None   • Years of education: None   • Highest education level: None   Occupational History   • Occupation: long term disability   Tobacco Use   • Smoking status: Never   • Smokeless tobacco: Never   Vaping Use   • Vaping status: Never Used   Substance and Sexual Activity   • Alcohol use: Never     Alcohol/week: 0.0 standard drinks of alcohol   • Drug use: No   • Sexual activity: Not Currently     Partners: Male     Birth control/protection: None   Other Topics Concern   • None   Social History Narrative    Caffeine use    Resides with spouse and one son     Social Determinants of Health     Financial Resource Strain: High Risk (8/25/2023)    Received from [facility]    Overall Financial Resource Strain (CARDIA)    • Difficulty of Paying Living Expenses: Very hard   Food Insecurity: Food Insecurity Present (9/26/2023)    Hunger Vital Sign    • Worried About Running Out of Food in the Last Year: Often true    • Ran Out of Food in the Last Year: Often true   Transportation Needs: Unmet Transportation Needs (9/26/2023)    PRAPARE - Transportation    • Lack of Transportation (Medical): Yes    • Lack of Transportation (Non-Medical): Yes   Physical Activity: Inactive (2/3/2020)    Exercise Vital Sign    • Days of Exercise per Week: 0 days    • Minutes of Exercise per Session: 0 min   Stress: No Stress Concern Present (2/3/2020)    [employer] [place] [employer] - Occupational Stress Questionnaire    • Feeling of Stress : Only a little   Social Connections: Unknown (2/3/2020)    Social Connection and Isolation Panel [NHANES]    • Frequency of Communication with Friends  and Family: Twice a week    • Frequency of Social Gatherings with Friends and Family: Twice a week    • Attends Yazidism Services: Not on file    • Active Member of Clubs or Organizations: Not on file    • Attends Club or Organization Meetings: Not on file    • Marital Status:    Intimate Partner Violence: Not At Risk (8/25/2023)    Received from [facility]    Humiliation, Afraid, Rape, and Kick questionnaire    • Fear of Current or Ex-Partner: No    • Emotionally Abused: No    • Physically Abused: No    • Sexually Abused: No   Housing Stability: High Risk (9/26/2023)    Housing Stability Vital Sign    • Unable to Pay for Housing in the Last Year: Yes    • Number of Places Lived in the Last Year: 3    • Unstable Housing in the Last Year: Yes        Current Outpatient Medications:   •  acetaminophen (TYLENOL) 325 mg tablet, Take 650 mg by mouth every 6 (six) hours as needed for mild pain, Disp: , Rfl:   •  albuterol (PROVENTIL HFA,VENTOLIN HFA) 90 mcg/act inhaler, Inhale 2 puffs every 6 (six) hours as needed for wheezing, Disp: , Rfl:   •  amLODIPine (NORVASC) 5 mg tablet, Take 1 tablet (5 mg total) by mouth daily, Disp: 30 tablet, Rfl: 3  •  apixaban (ELIQUIS) 5 mg, Take 1 tablet (5 mg total) by mouth 2 (two) times a day Do not start before August 17, 2023., Disp: , Rfl: 0  •  atorvastatin (LIPITOR) 40 mg tablet, Take 1 tablet (40 mg total) by mouth daily, Disp: 90 tablet, Rfl: 3  •  Blood Glucose Monitoring Suppl (OneTouch Verio Reflect) w/Device KIT, Use 1 each 4 (four) times a day (Patient not taking: Reported on 9/11/2023), Disp: 1 kit, Rfl: 0  •  Blood Pressure Monitor KIT, Check blood pressures BID (Patient not taking: Reported on 9/11/2023), Disp: 1 kit, Rfl: 0  •  cholecalciferol (VITAMIN D3) 1,000 units tablet, Take 2 tablets (2,000 Units total) by mouth daily, Disp: 60 tablet, Rfl: 2  •  docusate sodium (COLACE) 100 mg capsule, Take 1 capsule (100 mg total) by mouth 2 (two) times  a day, Disp: 60 capsule, Rfl: 0  •  gabapentin (NEURONTIN) 100 mg capsule, Take 1 capsule (100 mg total) by mouth 2 (two) times a day, Disp: 60 capsule, Rfl: 3  •  hydrALAZINE (APRESOLINE) 10 mg tablet, Take 1 tablet (10 mg total) by mouth 3 (three) times a day, Disp: 90 tablet, Rfl: 3  •  insulin glargine (LANTUS) 100 units/mL subcutaneous injection, Inject 18 Units under the skin every 12 (twelve) hours (Patient taking differently: Inject 15 Units under the skin every 12 (twelve) hours), Disp: 10 mL, Rfl: 0  •  insulin lispro (HumaLOG KwikPen) 100 units/mL injection pen, Inject 5 Units under the skin 3 (three) times a day with meals, Disp: , Rfl:   •  Insulin Pen Needle (BD Pen Needle Tita 2nd Gen) 32G X 4 MM MISC, For use with insulin pen. Pharmacy may dispense brand covered by insurance. (Patient not taking: Reported on 9/11/2023), Disp: 100 each, Rfl: 0  •  Insulin Pen Needle (BD Pen Needle Tita U/F) 32G X 4 MM MISC, Use four times daily as directed with insulin pen (Patient not taking: Reported on 9/11/2023), Disp: 200 each, Rfl: 3  •  Lancets (OneTouch Delica Plus [id number]) MISC, Use 1 each 4 (four) times a day (Patient not taking: Reported on 9/11/2023), Disp: 200 each, Rfl: 2  •  losartan (COZAAR) 100 MG tablet, Take 1 tablet (100 mg total) by mouth daily, Disp: 30 tablet, Rfl: 5  •  Menthol, Topical Analgesic, (Biofreeze) 4 % GEL, Apply 1 Application topically 2 (two) times a day, Disp: , Rfl:   •  metFORMIN (GLUCOPHAGE) 1000 MG tablet, Take 1 tablet (1,000 mg total) by mouth 2 (two) times a day with meals, Disp: 60 tablet, Rfl: 3  •  metoprolol tartrate (LOPRESSOR) 100 mg tablet, Take 1 tablet (100 mg total) by mouth every 12 (twelve) hours, Disp: 60 tablet, Rfl: 3  •  multivitamin-iron-minerals-folic acid (THERAPEUTIC-M) TABS tablet, Take 1 tablet by mouth daily, Disp: 30 tablet, Rfl: 2  •  predniSONE 1 mg tablet, Take 4 tablets (4 mg total) by mouth daily, Disp: 120 tablet, Rfl: 0  •  topiramate  (TOPAMAX) 100 mg tablet, Take 1 tablet (100 mg total) by mouth daily at bedtime (Patient taking differently: Take 100 mg by mouth daily at bedtime), Disp: 30 tablet, Rfl: 0    Review of Systems   Constitutional:  Negative for chills and fever.   Respiratory:  Negative for shortness of breath.    Cardiovascular:  Positive for leg swelling.   Skin:  Positive for wound (Right and left diabetic foot ulcerations, may be healed).   Psychiatric/Behavioral:  The patient is not nervous/anxious.        Objective:  /76   Pulse 64   Temp 98.4 °F (36.9 °C)   Resp 18         Physical Exam  Vitals and nursing note reviewed.   Constitutional:       General: She is not in acute distress.     Appearance: She is obese. She is not ill-appearing, toxic-appearing or diaphoretic.      Comments: Very pleasant, no acute distress   Cardiovascular:      Rate and Rhythm: Normal rate.      Pulses: Decreased pulses.           Posterior tibial pulses are detected w/ Doppler on the right side and detected w/ Doppler on the left side.      Comments: Very faint but palpable DP pulses bilaterally today, unable to palpate PT pulses but can be detected with Doppler  Pulmonary:      Effort: Pulmonary effort is normal. No respiratory distress.   Musculoskeletal:      Right lower leg: Edema present.      Left lower leg: Edema present.   Skin:     General: Skin is warm.      Findings: Wound present.      Comments: Healed/completely epithelialized wounds in right submet 5 base and left submet 5.  Some superficial hyperkeratotic debris that can be easily flaked off revealing epithelialization beneath.  See detailed wound assessment.   Neurological:      Mental Status: She is alert and oriented to person, place, and time.   Psychiatric:         Mood and Affect: Mood normal.         Behavior: Behavior normal.         Wound 09/11/23 Diabetic Ulcer Foot Left;Plantar (Active)   Enter Mcgee score: Mcgee Grade 1: Partial or full-thickness ulcer  (superficial) 12/18/23 1038   Wound Image   02/28/24 0928   Wound Description Epithelialization 02/28/24 0929   Soraya-wound Assessment Intact 02/05/24 0926   Wound Length (cm) 0 cm 02/28/24 0929   Wound Width (cm) 0 cm 02/28/24 0929   Wound Depth (cm) 0 cm 02/28/24 0929   Wound Surface Area (cm^2) 0 cm^2 02/28/24 0929   Wound Volume (cm^3) 0 cm^3 02/28/24 0929   Calculated Wound Volume (cm^3) 0 cm^3 02/28/24 0929   Drainage Amount None 02/28/24 0929   Drainage Description Serosanguineous;Yellow 01/22/24 0851   Non-staged Wound Description Full thickness 01/08/24 0951   Treatments Irrigation with NSS 01/08/24 0951   Dressing Foam 10/23/23 0820   Dressing Changed Changed 01/08/24 0951   Patient Tolerance Tolerated well 01/22/24 0851   Dressing Status Intact 02/28/24 0929       Wound 09/11/23 Diabetic Ulcer Foot Right;Plantar;Distal (Active)   Enter Mcgee score: Mcgee Grade 1: Partial or full-thickness ulcer (superficial) 12/18/23 1039   Wound Image    02/28/24 0928   Wound Description Epithelialization 02/28/24 0929   Soraya-wound Assessment Intact 02/05/24 0925   Wound Length (cm) 0 cm 02/28/24 0929   Wound Width (cm) 0 cm 02/28/24 0929   Wound Depth (cm) 0 cm 02/28/24 0929   Wound Surface Area (cm^2) 0 cm^2 02/28/24 0929   Wound Volume (cm^3) 0 cm^3 02/28/24 0929   Calculated Wound Volume (cm^3) 0 cm^3 02/28/24 0929   Change in Wound Size % 100 02/28/24 0929   Drainage Amount None 02/28/24 0929   Drainage Description Tan 12/18/23 1039   Non-staged Wound Description Full thickness 12/04/23 0920   Treatments Irrigation with NSS 11/06/23 1003   Dressing Changed Changed 10/23/23 0825   Patient Tolerance Tolerated well 01/08/24 0952   Dressing Status Intact 02/28/24 0929               Lab Results   Component Value Date    HGBA1C 10.2 (H) 08/24/2023       Wound Instructions:  Orders Placed This Encounter   Procedures   • Wound cleansing and dressings     Your wounds are healed. Continue good daily skin care and moisturize  daily. Monitor your feet daily and especially after any excessive time being on your feet.   Follow up with a podiatrist ---referral was placed today at wound care center  Thank you for choosing [facility].  Call wound care center if you notice any drainage or open wounds     Standing Status:   Future     Standing Expiration Date:   2/28/2025   • Ambulatory Referral to Podiatry     Standing Status:   Future     Standing Expiration Date:   2/28/2025     Referral Priority:   Routine     Referral Type:   Consult - AMB     Referral Reason:   Specialty Services Required     Referred to Provider:   Natanael Carr DPM     Requested Specialty:   Podiatry     Number of Visits Requested:   1     Expiration Date:   2/28/2025     Briana Huggins MD

## 2024-03-06 VITALS
TEMPERATURE: 97.7 F | HEART RATE: 79 BPM | SYSTOLIC BLOOD PRESSURE: 134 MMHG | DIASTOLIC BLOOD PRESSURE: 68 MMHG | BODY MASS INDEX: 42.32 KG/M2 | WEIGHT: 278.3 LBS | OXYGEN SATURATION: 96 % | RESPIRATION RATE: 19 BRPM

## 2024-03-06 RX ORDER — AMMONIUM LACTATE 12 G/100G
1 CREAM TOPICAL AS NEEDED
COMMUNITY

## 2024-03-07 NOTE — PROGRESS NOTES
[facility]  [address], [address], [place] [address]  [phone]    NAME: Edie Salazar  AGE: 63 y.o. SEX: female    Progress Note    Location: [facility]  POS: 32 ([facility])  Code Status: Full Code    Chief complaint / Reason for visit:  Acute Visit    Assessment/Plan:    Type 2 diabetes mellitus with hyperglycemia, with long-term current use of insulin (HCC)    Lab Results   Component Value Date    HGBA1C 10.2 (H) 08/24/2023     Recent HgA1c on 12/28/23 noted to be 7.0  More controlled   Encourage CCD  Returned from appointment with hypoglycemia  Reviewed blood sugars from the facility  Well controlled  Will need to ensure the next time she goes out to an appointment that a snack is provided  Resident able to recognize signs of hypoglycemia    Diabetic foot ulcer (HCC)  Follows with [facility]  Wounds have healed  Able to be WBAT  Inspect bilateral feet daily  Avoid tight fitting shoes  Follow with in house podiatry      This is a 63 y.o. female seen today at [facility].  Medical history includes, not limited to, type 2 DM, polymyalgia rheumatica, CKD stage 3A, hypertension, hyperlipidemia, and vitamin D deficiency.    Resident is seen today for an acute visit after returning back from the facility from her wound care appointment.  She returned not feeling well.  She was seen coming off the elevator with the transporter stating she felt as her blood sugar was low.  Checked by nursing staff with a low reading.  She was offered juice and some peanut butter crackers.  Monitored at the nurses station.  Blood sugar rechecked.  Offered more snacks.  She states she ate breakfast before her appointment but now recognizes that she needs to take a snack with her when she goes out to appointments.      Reviewed orders.           Nursing and prior provider notes reviewed on this visit. Discussed visit with PCP and nursing staff/ supervisor.      Review of Systems    Constitutional:  Positive for activity change. Negative for appetite change, fatigue, fever and unexpected weight change.   HENT:  Negative for congestion.    Eyes:  Negative for pain, discharge and visual disturbance.        Eyeglasses   Respiratory:  Negative for cough and shortness of breath.    Cardiovascular:  Negative for chest pain and palpitations.   Gastrointestinal:  Negative for abdominal pain, constipation and diarrhea.   Genitourinary:  Negative for difficulty urinating, dysuria, hematuria and urgency.   Musculoskeletal:  Positive for arthralgias and gait problem.   Skin:  Positive for wound (bilateral feet, diabetic ulcers). Negative for color change.   Neurological:  Positive for light-headedness. Negative for syncope and weakness.   Psychiatric/Behavioral:  Negative for confusion and sleep disturbance.    All other systems reviewed and are negative.         ALLERGY: Reviewed, unchanged  No Known Allergies    HISTORY:  Medical Hx: Reviewed, unchanged  Family Hx: Reviewed, unchanged  Soc Hx: Reviewed,  unchanged      Physical Exam  Vitals and nursing note reviewed.   Constitutional:       General: She is not in acute distress.     Appearance: Normal appearance. She is not ill-appearing.   HENT:      Head: Normocephalic.      Right Ear: Tympanic membrane normal.      Left Ear: Tympanic membrane normal.      Nose: Nose normal. No congestion.      Mouth/Throat:      Mouth: Mucous membranes are moist.      Pharynx: Oropharynx is clear.   Eyes:      General:         Right eye: No discharge.         Left eye: No discharge.      Extraocular Movements: Extraocular movements intact.      Conjunctiva/sclera: Conjunctivae normal.      Pupils: Pupils are equal, round, and reactive to light.   Cardiovascular:      Rate and Rhythm: Normal rate and regular rhythm.      Heart sounds: Normal heart sounds.      Comments: Decreased pedal pulses  Pulmonary:      Effort: Pulmonary effort is normal. No respiratory  "distress.      Breath sounds: Normal breath sounds.   Chest:      Chest wall: No tenderness.   Abdominal:      General: Bowel sounds are normal.      Palpations: Abdomen is soft.      Tenderness: There is no abdominal tenderness.   Musculoskeletal:         General: No swelling. Normal range of motion.      Cervical back: Normal range of motion.      Right lower leg: Edema present.      Left lower leg: Edema present.   Skin:     General: Skin is warm and dry.   Neurological:      Mental Status: She is alert and oriented to person, place, and time. Mental status is at baseline.      Motor: No weakness.   Psychiatric:         Mood and Affect: Mood normal.         Behavior: Behavior normal.         Thought Content: Thought content normal.         Judgment: Judgment normal.            Laboratory results / Imaging reviewed: Hard copy/ies in medical chart:    Vitals:    03/06/24 2334   BP: 134/68   Pulse: 79   Resp: 19   Temp: 97.7 °F (36.5 °C)   SpO2: 96%       Current Medications:  All medications reviewed and updated in Nursing Home Chart    Please note: This note was completed in part utilizing a voice-recognition software may have been used in the preparation of this document. Grammatical errors, random word insertion, spelling mistakes, and incomplete sentences may be an occasional consequence of the system secondary to software limitations, ambient noise and hardware issues. Occasional wrong word or \"sound-alike\" substitutions may have occurred due to the inherent limitations of voice recognition software. At the time of dictation, efforts were made to edit, clarify and/or correct errors. Interpretation should be guided by context. Please read the chart carefully and recognize, using context, where substitutions have occurred. If you have any questions or concerns about the context, text or information contained within the body of this dictation, please contact myself, the provider, for further " clarification.      Christin Quiroz, CONCETTA  3/6/2024

## 2024-03-07 NOTE — ASSESSMENT & PLAN NOTE
Follows with [facility]  Wounds have healed  Able to be WBAT  Inspect bilateral feet daily  Avoid tight fitting shoes  Follow with in house podiatry

## 2024-03-07 NOTE — ASSESSMENT & PLAN NOTE
Lab Results   Component Value Date    HGBA1C 10.2 (H) 08/24/2023     Recent HgA1c on 12/28/23 noted to be 7.0  More controlled   Encourage CCD  Returned from appointment with hypoglycemia  Reviewed blood sugars from the facility  Well controlled  Will need to ensure the next time she goes out to an appointment that a snack is provided  Resident able to recognize signs of hypoglycemia

## 2024-03-25 NOTE — CONSULTS
Medication: Spironolactone passed protocol.   Last office visit date: 3/2023  Next appointment scheduled?: Yes   Number of refills given: 1    Podiatry - Consultation    Patient Information:   Kailey Tillman 57 y.o. female MRN: [id number]  Unit/Bed#: 1575 [address] 222-02 Encounter: [id number]  PCP: Angela Matamoros MD  Date of Admission:  8/8/2023  Date of Consultation: 08/09/23  Requesting Physician: Christine Gloria DO      ASSESSMENT:    Kailey Tillman is a 57 y.o. female with:    1. Right plantar midfoot wound  2. Type 2 diabetes (HbA1c 10.2% from 7/21/2023)  3. Lower extremity DVT    PLAN:    · No acute plan for podiatric surgical intervention at this time. Bedside debridement of bilateral plantar feet performed see procedure note. · Continue IV antibiotics per primary team.  · Bilateral foot x-rays ordered to rule out any acute osseous deformity, osteomyelitis, soft tissue emphysema. · Wound cultures taken of left foot to tailor antibiotic therapy. · Local wound care consisting of Xeroform DSD. Wound care instructions placed. · Leukocytosis, continue to monitor labs/vitals. · Elevation on green foam wedges or pillows when non-ambulatory  · Rest of care per primary team.  · Will discuss this plan with my attending and update as needed. Weightbearing status: Weightbearing to feet for transfers only, otherwise please stay nonweightbearing as much as possible    Debridement Procedures:  After  Verbal Consent was obtained and time out performed. Wound located left plantar foot was  excisionally Surgical- Subcutaneous  (CPT: 88155) debrided using scapel. Pre-debridement wound measures 10 cm x 6 cm x 0 cm. Pain was controlled by Lack of sensation due to Neuropathy . Post debridement measurement 10 cm x 6 cm x 0.2 cm for a total of 60 square centimeters, with wound appearing Fresh bleeding tissue, viable and granular. 100%  of wound debrided. Tissue debrided includes depth of Subcutaneous with devitalized tissue being debrided including Hyperkeratosis, Slough and Fibrous. with a small amount of bleeding bleeding was noted during procedure.  Hemostasis was achieved using pressure. Pain noted during procedure rated as 0. Pain noted after procedure rated as 0. Procedure was Well tolerated by patient. After Verbal Consent was obtained and time out performed. Wound located right plantar foot was  excisionally Surgical- Subcutaneous  (CPT: 43559) debrided using scapel. Pre-debridement wound measures 8.8 cm x 4.8 cm x 0.2 cm. Pain was controlled by Lack of sensation due to Neuropathy . Post debridement measurement 9 cm x 5 cm x 0.3 cm for a total of 50 square centimeters, with wound appearing Fresh bleeding tissue, viable and granular. 100%  of wound debrided. Tissue debrided includes depth of Subcutaneous with devitalized tissue being debrided including Hyperkeratosis, Slough and Fibrous. without any bleeding bleeding was noted during procedure. Hemostasis was achieved using pressure. Pain noted during procedure rated as 0. Pain noted after procedure rated as 0. Procedure was Well tolerated by patient. SUBJECTIVE:    History of Present Illness:    Gerald Castañeda is a 57 y.o. female who is originally admitted 8/8/2023 due to right foot wound and shortness of breath secondary to pulmonary embolism. Patient has a past medical history of type 2 diabetes, chronic lower extremity wounds, hypertension, chronic kidney disease stage III, polymyalgia rheumatica, ambulatory dysfunction, legally blind secondary to diabetic retinopathy and seizures. Patient is homeless. We are consulted for bilateral plantar foot wounds. Patient states that she has been dealing with bilateral plantar foot wounds for the last several months. She states that she has been getting wound care with her primary care doctor often. Additionally patient [place] she states that she always wears shoes and never walks barefoot. She denies any nausea, vomiting, fever, chills. Reports some drainage from left foot wound.      Review of Systems:    Constitutional: Negative. HENT: Negative. Eyes: Negative. Respiratory: Negative. Cardiovascular: Negative. Gastrointestinal: Negative. Musculoskeletal: Bilateral plantar foot pain  Skin: Bilateral plantar foot wounds  Neurological: Diminished sensation bilateral lower extremities  Psych: Negative. Past Medical and Surgical History:     Past Medical History:   Diagnosis Date   • Anemia    • Arthritis    • Benign hypertension     Procedure/Onset: 01/01/2005; Description: BP elevated today. Pt reports running out of BP meds 2wks ago. Will resume BP meds.    • Diabetes mellitus ([address])    • Diabetes mellitus type 2 with complications, uncontrolled 9/8/2015   • Dyspnea on exertion    • Hyperlipidemia    • Hypertension    • Legally blind 2010   • Mild intermittent asthma with exacerbation 8/4/2018   • Miscarriage     x 3   • Polymyalgia rheumatica ([address]) 11/24/2021   • Seizures ([address])    • Sickle cell trait ([address])    • Sleep apnea    • Stage 3a chronic kidney disease ([address]) 5/19/2022   • Thyroid nodule 3/6/2020       Past Surgical History:   Procedure Laterality Date   • CATARACT EXTRACTION Bilateral     august and september   • EYE SURGERY     • HYSTERECTOMY      37   • OOPHORECTOMY Left     37   • IL LIGATION/BIOPSY TEMPORAL ARTERY Bilateral 10/17/2019    Procedure: BIOPSY ARTERY TEMPORAL;  Surgeon: Asa Aguilar DO;  Location: [facility];  Service: Vascular   • US GUIDED THYROID BIOPSY  5/13/2020       Meds/Allergies:    Medications Prior to Admission   Medication   • amLODIPine (NORVASC) 5 mg tablet   • aspirin (ECOTRIN LOW STRENGTH) 81 mg EC tablet   • atorvastatin (LIPITOR) 40 mg tablet   • Blood Glucose Monitoring Suppl (OneTouch Verio Reflect) w/Device KIT   • Blood Pressure Monitor KIT   • budesonide-formoterol (Symbicort) 160-4.5 mcg/act inhaler   • cholecalciferol (VITAMIN D3) 1,000 units tablet   • Continuous Blood Gluc  (FreeStyle Denisa 2 Phelps) ERIC   • Continuous Blood Gluc Sensor (FreeStyle Denisa 2 Sensor) MISC   • furosemide (LASIX) 20 mg tablet   • gabapentin (NEURONTIN) 100 mg capsule   • glucose blood (OneTouch Verio) test strip   • hydrALAZINE (APRESOLINE) 10 mg tablet   • insulin glargine (LANTUS) 100 units/mL subcutaneous injection   • insulin lispro (HumaLOG) 100 units/mL injection   • Insulin Pen Needle (BD Pen Needle Tita 2nd Gen) 32G X 4 MM MISC   • Insulin Pen Needle (BD Pen Needle Tita U/F) 32G X 4 MM MISC   • Lancets (OneTouch Delica Plus [id number]) MISC   • loperamide (IMODIUM) 2 mg capsule   • losartan (COZAAR) 100 MG tablet   • metFORMIN (GLUCOPHAGE) 1000 MG tablet   • metoprolol tartrate (LOPRESSOR) 100 mg tablet   • multivitamin-iron-minerals-folic acid (THERAPEUTIC-M) TABS tablet   • Nutritional Supplements (ProSource No Carb) LIQD   • Petrolatum-Zinc Oxide 57-17 % PSTE   • predniSONE 1 mg tablet   • topiramate (TOPAMAX) 100 mg tablet   • acetaminophen (TYLENOL) 325 mg tablet   • docusate sodium (COLACE) 100 mg capsule   • Emollient (eucerin) lotion       No Known Allergies    Social History:     Marital Status: /Civil Union    Substance Use History:   Social History     Substance and Sexual Activity   Alcohol Use Never   • Alcohol/week: 0.0 standard drinks of alcohol     Social History     Tobacco Use   Smoking Status Never   Smokeless Tobacco Never     Social History     Substance and Sexual Activity   Drug Use No       Family History:    Family History   Problem Relation Age of Onset   • Heart attack Mother    • Heart disease Mother    • Hypertension Mother    • No Known Problems Father    • Heart disease Sister    • No Known Problems Brother    • No Known Problems Son    • No Known Problems Daughter    • Heart attack Maternal Grandmother    • Heart disease Maternal Grandmother    • Diabetes Other    • Heart attack Other    • No Known Problems Sister    • No Known Problems Sister    • No Known Problems Paternal Aunt    • No Known Problems Son • Cancer Neg Hx    • Stroke Neg Hx          OBJECTIVE:    Vitals:   Blood Pressure: 111/58 (08/09/23 0745)  Pulse: 64 (08/09/23 0745)  Temperature: 98.4 °F (36.9 °C) (08/09/23 0745)  Temp Source: Oral (08/08/23 1146)  Respirations: 20 (08/09/23 0745)  Weight - Scale: 125 kg (276 lb 3.8 oz) (08/08/23 1216)  SpO2: 98 % (08/09/23 0745)    Physical Exam:    General Appearance: Alert, cooperative, no distress. HEENT: Head normocephalic, atraumatic, without obvious abnormality. Heart: Normal rate and rhythm. Lungs: Non-labored breathing. No respiratory distress. Abdomen: Without distension. Psychiatric: AAOx3  Lower Extremity:  Vascular:   Right DP and PT pulses are Dopplerable. Left DP and PT pulses are Dopplerable. CRT < 3 seconds at the digits. +2/4 edema noted at bilateral lower extremities. Pedal hair is absent. Skin temperature is WNL bilaterally. Musculoskeletal:  MMT is 4/5 in all muscle compartments bilaterally. ROM at the 1st MPJ and ankle joint are decreased bilaterally with the leg extended. No Pain on palpation of bilateral foot and ankle. No gross deformities noted. Dermatological:  Lower extremity wound(s) as noted below:    Wound #: 1  Location: right lateral plantar foot  Length 10 cm: Width 6 cm: Depth unknown cm:   Deepest Tissue Noted in Base: Unknown   Peripheral Skin Description: Attached  Granulation: 70% Fibrotic Tissue: 0% Necrotic Tissue: 30%   Drainage Amount: minimal, serous  Signs of Infection: No      Wound #: 2  Location: left plantar foot  Length 9cm: Width 5cm: Depth 0.2cm:   Deepest Tissue Noted in Base: subcutaneous  Probe to Bone: No  Peripheral Skin Description: Attached  Granulation: 100% Fibrotic Tissue: 0% Necrotic Tissue: 0%   Drainage Amount: clear  Signs of Infection: No      Neurological:  Gross sensation is diminished. Protective sensation is diminished. Patient Reports numbness and/or paresthesias.     Clinical Images 08/09/23:              Additional data: Lab Results: I have personally reviewed pertinent labs including:    Results from last 7 days   Lab Units 08/09/23  0628   WBC Thousand/uL 12.85*   HEMOGLOBIN g/dL 8.5*   HEMATOCRIT % 28.8*   PLATELETS Thousands/uL 398*   NEUTROS PCT % 65   LYMPHS PCT % 24   MONOS PCT % 7   EOS PCT % 2     Results from last 7 days   Lab Units 08/09/23  0628 08/08/23  1302   POTASSIUM mmol/L 3.3* 3.5   CHLORIDE mmol/L 107 101   CO2 mmol/L 28 27   BUN mg/dL 14 19   CREATININE mg/dL 0.86 0.85   CALCIUM mg/dL 8.4 9.3   ALK PHOS U/L  --  132*   ALT U/L  --  7   AST U/L  --  10*     Results from last 7 days   Lab Units 08/08/23  2251   INR  1.13             Imaging: I have personally reviewed pertinent reports in PACS. EKG, Pathology, and Other Studies: I have personally reviewed pertinent reports. ** Please Note: Portions of the record may have been created with voice recognition software. Occasional wrong word or "sound a like" substitutions may have occurred due to the inherent limitations of voice recognition software. Read the chart carefully and recognize, using context, where substitutions have occurred.  **

## 2024-03-27 ENCOUNTER — NURSING HOME VISIT (OUTPATIENT)
Dept: GERIATRICS | Facility: OTHER | Age: 64
End: 2024-03-27
Payer: COMMERCIAL

## 2024-03-27 DIAGNOSIS — E11.42 DIABETIC POLYNEUROPATHY ASSOCIATED WITH TYPE 2 DIABETES MELLITUS (HCC): ICD-10-CM

## 2024-03-27 DIAGNOSIS — R26.81 GAIT INSTABILITY: ICD-10-CM

## 2024-03-27 DIAGNOSIS — I27.82 CHRONIC PULMONARY EMBOLISM, UNSPECIFIED PULMONARY EMBOLISM TYPE, UNSPECIFIED WHETHER ACUTE COR PULMONALE PRESENT (HCC): ICD-10-CM

## 2024-03-27 DIAGNOSIS — R60.0 BILATERAL LOWER EXTREMITY EDEMA: ICD-10-CM

## 2024-03-27 DIAGNOSIS — E78.5 DYSLIPIDEMIA: ICD-10-CM

## 2024-03-27 DIAGNOSIS — D50.8 IRON DEFICIENCY ANEMIA SECONDARY TO INADEQUATE DIETARY IRON INTAKE: ICD-10-CM

## 2024-03-27 DIAGNOSIS — Z79.4 TYPE 2 DIABETES MELLITUS WITH HYPERGLYCEMIA, WITH LONG-TERM CURRENT USE OF INSULIN (HCC): Primary | ICD-10-CM

## 2024-03-27 DIAGNOSIS — N18.31 STAGE 3A CHRONIC KIDNEY DISEASE (HCC): ICD-10-CM

## 2024-03-27 DIAGNOSIS — I67.1 LEFT CAVERNOUS CAROTID ANEURYSM: ICD-10-CM

## 2024-03-27 DIAGNOSIS — R56.9 SEIZURES (HCC): ICD-10-CM

## 2024-03-27 DIAGNOSIS — Z71.89 ADVANCE CARE PLANNING: ICD-10-CM

## 2024-03-27 DIAGNOSIS — M35.3 PMR (POLYMYALGIA RHEUMATICA) (HCC): ICD-10-CM

## 2024-03-27 DIAGNOSIS — H54.8 LEGALLY BLIND: ICD-10-CM

## 2024-03-27 DIAGNOSIS — I10 PRIMARY HYPERTENSION: ICD-10-CM

## 2024-03-27 DIAGNOSIS — D72.823 LEUKEMOID REACTION: ICD-10-CM

## 2024-03-27 DIAGNOSIS — E11.65 TYPE 2 DIABETES MELLITUS WITH HYPERGLYCEMIA, WITH LONG-TERM CURRENT USE OF INSULIN (HCC): Primary | ICD-10-CM

## 2024-03-27 PROCEDURE — 99309 SBSQ NF CARE MODERATE MDM 30: CPT | Performed by: STUDENT IN AN ORGANIZED HEALTH CARE EDUCATION/TRAINING PROGRAM

## 2024-03-27 NOTE — PROGRESS NOTES
"[facility]  Facility: [facility]    PROGRESS NOTE  Nursing Home Place of Service: nursing home place of service: POS 32 Unskilled- No Part A Coverage    NAME: Edie Salazar  : 1960 AGE: 63 y.o. SEX: female MRN: [id number]  DATE OF ENCOUNTER: 3/27/2024    Assessment and Plan     Problem List Items Addressed This Visit       Seizures (HCC)     Stable, no noted recent seizures  Continue topamax         PMR (polymyalgia rheumatica) (HCC)     Stable on prednisone  Following Rheumatology outpatient         Left cavernous carotid aneurysm     CTA head  \"No acute intracranial disease, volume loss greater than expected for age.   Two, 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.\"  Saw [facility] in 2022 for this  Continue BP control  Monitor for any acute neurological symptoms - no apparent/acute associated symptoms  Plan was to have repeat CTA head w/wo contrast and f/u in 2 years - would be due to follow-up around this time  Will discuss with NP/staff regarding scheduling her for updated CTA head and NSG f/u         Hypertension     Monitor BP - generally stable 120s-150s systolic, rarely higher  Avoid hypotension  No acute cardiac complaints  Continue regimen including hydralazine 10mg TID, losartan 100mg, metoprolol tartrate 100mg BID, amlodipine 5mg         Gait instability     In setting of diabetic neuropathy, diabetic-related foot wound hx  Diabetic shoes paperwork completed  Follow podiatry as appropriate  -PT/OT  -fall precautions  -encourage appropriate DME use  -SW to follow for safe discharge planning/homecare services as appropriate should discharge out of facility become a possibility           Stage 3a chronic kidney disease (HCC)     Lab Results   Component Value Date    EGFR 78 2023    EGFR 74 2023    EGFR 79 2023    CREATININE 0.80 2023    CREATININE 0.83 2023    CREATININE 0.79 " 09/25/2023       Monitor renal function on routine labs - seeming stable  Avoid nephrotoxins, NSAIDs as able  Encourage PO hydration, respecting volume status           Diabetic neuropathy (HCC)       Lab Results   Component Value Date    HGBA1C 10.2 (H) 08/24/2023     Stable on gabapentin  With hx of diabetic foot wounds, healing/improving with wound care  Paperwork completed for diabetic shoes which she would likely benefit from         Bilateral lower extremity edema         Has grade 1 diastolic dysfunction noted on echo  Monitor routine weight - appears to have some gradual overall increase since Sept 2023 (per patient likely due to eating better) but no acute alarming uptrend  Monitor volume status clinically - has some peripheral edema but seemingly stable, no orthopnea or respiratory symptoms or evidence of fluid overload otherwise clinically  Encourage compression, elevation  She is on amlodipine which could contribute  Not presently on diuretic; if edema worsens could consider addition of diuretic or for example changing her BP regimen to include HCTZ if renal function allows  Continue to monitor            Dyslipidemia     lipid panel (04/19/23)  cholesterol-152  triglyceride-140  LDL-94  HDL-30  Continue atorvastatin 40 mg po daily  Recommend repeat lipid panel's yearly         Type 2 diabetes mellitus with hyperglycemia (HCC) - Primary       Lab Results   Component Value Date    HGBA1C 10.2 (H) 08/24/2023     recent A1c 12/28/23 improved to 7.0  Monitor A1c periodically  Monitor glucose - fasting sugars generally low 100s, later in day generally mid 100s-low 200s  Avoid hypoglycemia  Continue regimen including metformin 1000mg BID, humalog 5u TIDAC, lantus 15u BID  Consider weaning regimen if sugars remain well controlled or concern for hypoglycemia         Legally blind     Legally blind per patient and per chart review, likely some component of diabetic retinopathy  Stable  Reports she is able to see  sufficiently to read and do paperwork if she looks closely or uses corrective aid under good lighting  Encourage corrective aid  Follow with Ophthalmology as appropriate         Leukocytosis     Seems chronic on lab review  Likely in setting of chronic prednisone  Monitor for signs of acute infection - no present signs  Monitor periodically on labs         Pulmonary embolism (HCC)     Hx of PE and DVT in Aug 2023  Continue Eliquis         Advance care planning     Was previously discussed at length at a prior visit 2/6/24  Briefly revisited today to confirm/evaluate for any changes in patient's wishes  Patient verifies HCP as brother René Escalante and alt as son Ronnie Salazar  Patient verifies desire for Full Code status as prior         Iron deficiency anemia secondary to inadequate dietary iron intake     Hb recent baseline seems around 10-11 from available data  Likely in setting of iron deficiency and CKD  Continue PO iron  -monitor on routine labs  -monitor for acute bleed - no present signs  -consider further workup, Heme consult if persistent/worsening  -transfuse PRN Hb <7                  Chief Complaint     Follow up 60 day    History of Present Illness     Edie Salazar is a 63 y.o. female who was seen today for follow up. PMH including type 2 DM, polymyalgia rheumatica, CKD stage 3A, hypertension, hyperlipidemia     Patient seen and examined in room  Others present: none  Patient seated in chair (wheelchair)  Appears comfortable, awake, alert, oriented to situation, able to converse appropriately  Patient Aox3, polite, appears in fair to good spirits. Notes she feels well recently, no acute concerns. She expresses hope to be able to eventually find alternate housing arrangement and live independently ultimately, but for now OK here. Notes her diabetic foot wounds have healed well and presently no associated concern. Feels her peripheral edema is stable and better overall than prior. Breathing well, no  orthopnea. Appetite good, no swallowing concerns, no abd pain/N/V, reports regular BM. No acute pain anywhere. Gets around on her own via wheelchair and interested in continuing with therapy with goal of using walker. Denies recent falls.  No further questions or acute concerns identified.  No acute concerns per nursing.    Lab Review:   11/13/23: CBC with Hb 10.6 (recent baseline seems around 10-11 from available data), WBC 12.3 (seems to be somewhat chronically elevated on available prior labs); CMP generally non-actionable, eGFR 73 (baseline seems 50s-70s); Mg 1.9; iron 33; B12 >1000; vit D WNL  12/28/23: A1c 7.0           Echo Aug 2023: EF 55, normal systolic func, grade 1 diastolic dysfunction, mild-mod TR       The following portions of the patient's history were reviewed and updated as appropriate: allergies, current medications, past family history, past medical history, past social history, past surgical history and problem list.    Review of Systems     Review of Systems   Constitutional:  Negative for appetite change, chills, diaphoresis and fever.   HENT:  Negative for drooling, ear pain, hearing loss, rhinorrhea and sore throat.    Eyes:  Negative for pain, discharge, redness, itching and visual disturbance (baseline visual impairment, reports being legally blind).   Respiratory:  Negative for cough, chest tightness, shortness of breath and wheezing.    Cardiovascular:  Negative for chest pain, palpitations and leg swelling.   Gastrointestinal:  Negative for abdominal pain, blood in stool, constipation, diarrhea, nausea and vomiting.   Genitourinary:  Negative for difficulty urinating, dysuria and hematuria.   Musculoskeletal:  Positive for gait problem. Negative for arthralgias (improved/stable).   Skin:  Positive for wound (bilateral feet, diabetic foot wounds, doing better). Negative for color change.   Neurological:  Negative for dizziness, facial asymmetry, speech difficulty and headaches.    Psychiatric/Behavioral:  Negative for agitation, behavioral problems and confusion. The patient is not nervous/anxious and is not hyperactive.    All other systems reviewed and are negative.      Active Problem List     Patient Active Problem List   Diagnosis    Uncontrolled type 2 diabetes mellitus with hyperglycemia (HCC)    Seizures (HCC)    Exertional dyspnea    Enlarged pulmonary artery (HCC)    Aortic dilatation (HCC)    Anemia    Decreased pulses in feet    Hyperlipidemia    Microalbuminuria    Neuropathy of hand, right    QT prolongation    Visual impairment in both eyes    Vitamin D deficiency    Lung nodules    Orthostatic hypotension    Mild intermittent asthma without complication    Type 2 diabetes mellitus with microalbuminuria, with long-term current use of insulin ([facility])    Other inflammatory and immune myopathies, not elsewhere classified    Morbid obesity (HCC)    Polyneuropathy associated with underlying disease ([facility])    PMR (polymyalgia rheumatica) ([facility])    Thrombocytosis    Left cavernous carotid aneurysm    Type 2 diabetes mellitus with hyperglycemia, with long-term current use of insulin ([facility])    Aneurysm ([facility])    Thyroid nodule    History of absence seizures    Hypersomnia    Cerebral aneurysm without rupture    Chronic migraine without aura without status migrainosus, not intractable    Hypertension    Depressed mood    Blurry vision, bilateral    Ulnar neuropathy of right upper extremity    Polymyalgia rheumatica ([facility])    Gait instability    Stage 3a chronic kidney disease (HCC)    Obstructive sleep apnea    Diabetic neuropathy (HCC)    Unsteadiness on feet    Abnormal urinalysis    Weakness    Intertrigo    Leg numbness    Unsheltered homelessness    Bilateral lower extremity edema    Diarrhea    Dyslipidemia    Insulin long-term use (HCC)    Type 2 diabetes mellitus with hyperglycemia (HCC)    Ambulatory dysfunction    Legally blind    Suspected pressure injury of deep tissue    Calculus  of gallbladder without cholecystitis without obstruction    Diabetes mellitus (HCC)    Abnormal CT scan    Leukocytosis    Encounter for support and coordination of transition of care    Food insecurity    Homelessness    Ulcer of left heel (HCC)    Pulmonary embolism (HCC)    Diabetic foot ulcer (HCC)    Cellulitis of lower extremity    Chronic anemia    Advance care planning    Encounter for competency evaluation    Iron deficiency anemia secondary to inadequate dietary iron intake       Objective     Vital Signs:     BP: 169/83 mmHg  3/22/2024 16:37   Temp:97.7 °F  2/28/2024 11:29 Pulse:66 bpm  3/22/2024 08:20   Weight:282.2 Lbs  3/26/2024 14:52   Resp:19 Breaths/min  2/28/2024 11:29 BS:95 mg/dL  3/27/2024 08:50 O2:96 %  2/28/2024 11:29 Pain:0  3/27/2024 15:50       Physical Exam  Vitals reviewed.   Constitutional:       General: She is not in acute distress.     Appearance: She is obese. She is not toxic-appearing or diaphoretic.   HENT:      Head: Normocephalic and atraumatic.      Right Ear: External ear normal.      Left Ear: External ear normal.      Nose: Nose normal. No rhinorrhea.      Mouth/Throat:      Mouth: Mucous membranes are moist.      Pharynx: Oropharynx is clear. No posterior oropharyngeal erythema.   Eyes:      General: No scleral icterus.        Right eye: No discharge.         Left eye: No discharge.      Extraocular Movements: Extraocular movements intact.      Conjunctiva/sclera: Conjunctivae normal.      Pupils: Pupils are equal, round, and reactive to light.   Cardiovascular:      Rate and Rhythm: Normal rate and regular rhythm.   Pulmonary:      Effort: Pulmonary effort is normal. No respiratory distress.      Breath sounds: Normal breath sounds. No wheezing or rales.   Abdominal:      General: Bowel sounds are normal.      Palpations: Abdomen is soft.      Tenderness: There is no abdominal tenderness. There is no guarding.   Musculoskeletal:         General: No tenderness.      Cervical  back: No rigidity.      Comments: 1+ bilateral lower extremity edema (chronic/stable and overall improved per patient)   Skin:     General: Skin is warm and dry.      Coloration: Skin is not jaundiced.   Neurological:      Mental Status: She is alert and oriented to person, place, and time. Mental status is at baseline.   Psychiatric:         Mood and Affect: Mood normal.         Behavior: Behavior normal.         Thought Content: Thought content normal.         Judgment: Judgment normal.         Pertinent Laboratory/Diagnostic Studies:  Laboratory and Imaging studies reviewed. Full report in the paper chart.     Current Medications   Medications reviewed and updated in facility chart.      -Total time spent on this encounter today including documentation and workup review, face to face time, history and exam, and documentation/orders was approximately 40 minutes.  -This note will be copied to [facility] EMR where vitals and medication orders are placed.    Fidel Harris D.O.  Geriatric Medicine  3/28/2024 9:57 AM

## 2024-03-28 PROBLEM — D50.8 IRON DEFICIENCY ANEMIA SECONDARY TO INADEQUATE DIETARY IRON INTAKE: Status: ACTIVE | Noted: 2024-03-28

## 2024-03-28 NOTE — ASSESSMENT & PLAN NOTE
Monitor BP - generally stable 120s-150s systolic, rarely higher  Avoid hypotension  No acute cardiac complaints  Continue regimen including hydralazine 10mg TID, losartan 100mg, metoprolol tartrate 100mg BID, amlodipine 5mg

## 2024-03-28 NOTE — ASSESSMENT & PLAN NOTE
Has grade 1 diastolic dysfunction noted on echo  Monitor routine weight - appears to have some gradual overall increase since Sept 2023 (per patient likely due to eating better) but no acute alarming uptrend  Monitor volume status clinically - has some peripheral edema but seemingly stable, no orthopnea or respiratory symptoms or evidence of fluid overload otherwise clinically  Encourage compression, elevation  She is on amlodipine which could contribute  Not presently on diuretic; if edema worsens could consider addition of diuretic or for example changing her BP regimen to include HCTZ if renal function allows  Continue to monitor

## 2024-03-28 NOTE — ASSESSMENT & PLAN NOTE
Legally blind per patient and per chart review, likely some component of diabetic retinopathy  Stable  Reports she is able to see sufficiently to read and do paperwork if she looks closely or uses corrective aid under good lighting  Encourage corrective aid  Follow with Ophthalmology as appropriate

## 2024-03-28 NOTE — ASSESSMENT & PLAN NOTE
Was previously discussed at length at a prior visit 2/6/24  Briefly revisited today to confirm/evaluate for any changes in patient's wishes  Patient verifies HCP as brother René Escalante and alt as son Ronnie Salazar  Patient verifies desire for Full Code status as prior

## 2024-03-28 NOTE — ASSESSMENT & PLAN NOTE
Lab Results   Component Value Date    HGBA1C 10.2 (H) 08/24/2023     Stable on gabapentin  With hx of diabetic foot wounds, healing/improving with wound care  Paperwork completed for diabetic shoes which she would likely benefit from

## 2024-03-28 NOTE — ASSESSMENT & PLAN NOTE
In setting of diabetic neuropathy, diabetic-related foot wound hx  Diabetic shoes paperwork completed  Follow podiatry as appropriate  -PT/OT  -fall precautions  -encourage appropriate DME use  -SW to follow for safe discharge planning/homecare services as appropriate should discharge out of facility become a possibility

## 2024-03-28 NOTE — ASSESSMENT & PLAN NOTE
"CTA head 2022 \"No acute intracranial disease, volume loss greater than expected for age.   Two, 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.\"  Saw [facility] in March 2022 for this  Continue BP control  Monitor for any acute neurological symptoms - no apparent/acute associated symptoms  Plan was to have repeat CTA head w/wo contrast and f/u in 2 years - would be due to follow-up around this time  Will discuss with NP/staff regarding scheduling her for updated CTA head and NSG f/u  "

## 2024-03-28 NOTE — ASSESSMENT & PLAN NOTE
Lab Results   Component Value Date    HGBA1C 10.2 (H) 08/24/2023     recent A1c 12/28/23 improved to 7.0  Monitor A1c periodically  Monitor glucose - fasting sugars generally low 100s, later in day generally mid 100s-low 200s  Avoid hypoglycemia  Continue regimen including metformin 1000mg BID, humalog 5u TIDAC, lantus 15u BID  Consider weaning regimen if sugars remain well controlled or concern for hypoglycemia

## 2024-03-28 NOTE — ASSESSMENT & PLAN NOTE
Hb recent baseline seems around 10-11 from available data  Likely in setting of iron deficiency and CKD  Continue PO iron  -monitor on routine labs  -monitor for acute bleed - no present signs  -consider further workup, Heme consult if persistent/worsening  -transfuse PRN Hb <7

## 2024-03-28 NOTE — ASSESSMENT & PLAN NOTE
Lab Results   Component Value Date    EGFR 78 09/28/2023    EGFR 74 09/26/2023    EGFR 79 09/25/2023    CREATININE 0.80 09/28/2023    CREATININE 0.83 09/26/2023    CREATININE 0.79 09/25/2023       Monitor renal function on routine labs - seeming stable  Avoid nephrotoxins, NSAIDs as able  Encourage PO hydration, respecting volume status

## 2024-03-29 ENCOUNTER — TELEPHONE (OUTPATIENT)
Dept: NEUROSURGERY | Facility: CLINIC | Age: 64
End: 2024-03-29

## 2024-03-29 DIAGNOSIS — I67.1 LEFT CAVERNOUS CAROTID ANEURYSM: ICD-10-CM

## 2024-03-29 DIAGNOSIS — Z01.818 PRE-PROCEDURAL EXAMINATION: Primary | ICD-10-CM

## 2024-03-29 NOTE — TELEPHONE ENCOUNTER
3/29/24 - CALL FROM CHADD @ [facility] TO SCHED APPT - 1-2 WEEKS ANEURYSM - NO RECENT IMAGING    CALLED NURSE CHADD [phone] TO INFORM APPT WILL BE 2 YEAR FU W/CTA HEAD     TRANSFERRED CALL TO NURSING FOR FOR FU (ORDERS FOR IMAGING)

## 2024-03-29 NOTE — TELEPHONE ENCOUNTER
Received call transferred to nurseline. Nurse at [facility] requesting CTA order faxed to [phone]. Completed.

## 2024-04-05 NOTE — TELEPHONE ENCOUNTER
04/05/2024- CALLED [facility] [phone] AND SPOKE TO THE NURSE TO SCHEDULE CTA AND F/U APT. TRANSFERRED TO CENTRAL SCHEDULING TO SCHEDULE CTA.    **WAITING FOR CTA TO BE SCHEDULED**

## 2024-04-09 NOTE — ASSESSMENT & PLAN NOTE
Recommend outpatient weight loss when appropriate REPLACEMENT      CARDIAC CATHETERIZATION  10/08/2020    RIGHT AND LEFT  /  DR WALKER  NMGIANFRANCO CORS  /  NML LV SYSTOLIC FUNCTION / Severe aortic regurgitation. Dilated aortic root.   CT CONSULT    COLONOSCOPY      EYE SURGERY Bilateral     LASER TO BOTH EYES    SKIN CANCER EXCISION      TESTICLE SURGERY Right     1992 benign adenoma    TRANSESOPHAGEAL ECHOCARDIOGRAM  04/21/2021    DR HERR  /       Social History:    Social History     Tobacco Use    Smoking status: Never    Smokeless tobacco: Never   Substance Use Topics    Alcohol use: Yes     Comment: occassional                                Counseling given: Not Answered      Vital Signs (Current):   Vitals:    04/09/24 1019 04/09/24 1035   BP: 118/86    Pulse: 83    Resp: 18    Temp: 97.3 °F (36.3 °C)    SpO2: 94%    Weight:  85.7 kg (189 lb)   Height:  1.727 m (5' 8\")                                              BP Readings from Last 3 Encounters:   04/09/24 118/86   03/13/24 (!) 136/92   02/14/24 112/88       NPO Status: Time of last liquid consumption: 0500                        Time of last solid consumption: 2000                        Date of last liquid consumption: 04/09/24 (bowel prep)                        Date of last solid food consumption: 04/06/24    BMI:   Wt Readings from Last 3 Encounters:   04/09/24 85.7 kg (189 lb)   03/13/24 87.6 kg (193 lb 3.2 oz)   02/14/24 88.4 kg (194 lb 14.4 oz)     Body mass index is 28.74 kg/m².    CBC:   Lab Results   Component Value Date/Time    WBC 6.5 12/27/2023 09:52 AM    RBC 5.17 12/27/2023 09:52 AM    RBC 4.64 01/18/2012 11:01 AM    HGB 15.7 12/27/2023 09:52 AM    HCT 49.7 12/27/2023 09:52 AM    MCV 96.1 12/27/2023 09:52 AM    RDW 13.3 12/27/2023 09:52 AM     12/27/2023 09:52 AM     01/18/2012 11:01 AM       CMP:   Lab Results   Component Value Date/Time     03/07/2024 08:20 AM    K 5.0 03/07/2024 08:20 AM     03/07/2024 08:20 AM    CO2 27 03/07/2024 08:20 AM    BUN 19 03/07/2024 08:20

## 2024-04-16 ENCOUNTER — TELEPHONE (OUTPATIENT)
Dept: OTHER | Facility: OTHER | Age: 64
End: 2024-04-16

## 2024-04-16 NOTE — TELEPHONE ENCOUNTER
"Tasneem LEDEZMA stated, \"The pt is on insulin. She was with her family and missed all of her 1700 medications. I would like recommendations now with her medications.\"    On call notified via TC   "

## 2024-04-18 ENCOUNTER — NURSING HOME VISIT (OUTPATIENT)
Dept: GERIATRICS | Facility: OTHER | Age: 64
End: 2024-04-18
Payer: COMMERCIAL

## 2024-04-18 DIAGNOSIS — N18.31 STAGE 3A CHRONIC KIDNEY DISEASE (HCC): ICD-10-CM

## 2024-04-18 DIAGNOSIS — I72.9 ANEURYSM (HCC): Primary | ICD-10-CM

## 2024-04-18 DIAGNOSIS — M35.3 POLYMYALGIA RHEUMATICA (HCC): ICD-10-CM

## 2024-04-18 DIAGNOSIS — G43.709 CHRONIC MIGRAINE WITHOUT AURA WITHOUT STATUS MIGRAINOSUS, NOT INTRACTABLE: ICD-10-CM

## 2024-04-18 DIAGNOSIS — Z79.4 TYPE 2 DIABETES MELLITUS WITH HYPERGLYCEMIA, WITH LONG-TERM CURRENT USE OF INSULIN (HCC): ICD-10-CM

## 2024-04-18 DIAGNOSIS — E11.65 TYPE 2 DIABETES MELLITUS WITH HYPERGLYCEMIA, WITH LONG-TERM CURRENT USE OF INSULIN (HCC): ICD-10-CM

## 2024-04-18 PROCEDURE — 99308 SBSQ NF CARE LOW MDM 20: CPT

## 2024-04-22 VITALS
HEART RATE: 61 BPM | SYSTOLIC BLOOD PRESSURE: 156 MMHG | WEIGHT: 285.1 LBS | RESPIRATION RATE: 18 BRPM | BODY MASS INDEX: 43.35 KG/M2 | TEMPERATURE: 97.7 F | DIASTOLIC BLOOD PRESSURE: 79 MMHG | OXYGEN SATURATION: 99 %

## 2024-04-23 ENCOUNTER — NURSING HOME VISIT (OUTPATIENT)
Dept: GERIATRICS | Facility: OTHER | Age: 64
End: 2024-04-23
Payer: COMMERCIAL

## 2024-04-23 VITALS
RESPIRATION RATE: 18 BRPM | BODY MASS INDEX: 43.35 KG/M2 | SYSTOLIC BLOOD PRESSURE: 156 MMHG | HEART RATE: 61 BPM | OXYGEN SATURATION: 99 % | DIASTOLIC BLOOD PRESSURE: 79 MMHG | TEMPERATURE: 97.7 F | WEIGHT: 285.1 LBS

## 2024-04-23 DIAGNOSIS — E11.621 DIABETIC FOOT ULCER ASSOCIATED WITH TYPE 2 DIABETES MELLITUS, WITH FAT LAYER EXPOSED, UNSPECIFIED LATERALITY, UNSPECIFIED PART OF FOOT (HCC): Primary | ICD-10-CM

## 2024-04-23 DIAGNOSIS — L97.502 DIABETIC FOOT ULCER ASSOCIATED WITH TYPE 2 DIABETES MELLITUS, WITH FAT LAYER EXPOSED, UNSPECIFIED LATERALITY, UNSPECIFIED PART OF FOOT (HCC): Primary | ICD-10-CM

## 2024-04-23 DIAGNOSIS — I10 PRIMARY HYPERTENSION: ICD-10-CM

## 2024-04-23 DIAGNOSIS — J45.20 MILD INTERMITTENT ASTHMA WITHOUT COMPLICATION: ICD-10-CM

## 2024-04-23 DIAGNOSIS — R26.81 GAIT INSTABILITY: ICD-10-CM

## 2024-04-23 DIAGNOSIS — Z79.4 TYPE 2 DIABETES MELLITUS WITH HYPERGLYCEMIA, WITH LONG-TERM CURRENT USE OF INSULIN (HCC): ICD-10-CM

## 2024-04-23 DIAGNOSIS — E11.65 TYPE 2 DIABETES MELLITUS WITH HYPERGLYCEMIA, WITH LONG-TERM CURRENT USE OF INSULIN (HCC): ICD-10-CM

## 2024-04-23 DIAGNOSIS — D50.8 IRON DEFICIENCY ANEMIA SECONDARY TO INADEQUATE DIETARY IRON INTAKE: ICD-10-CM

## 2024-04-23 DIAGNOSIS — R45.89 DEPRESSED MOOD: ICD-10-CM

## 2024-04-23 PROCEDURE — 99309 SBSQ NF CARE MODERATE MDM 30: CPT

## 2024-04-23 RX ORDER — FERROUS SULFATE 325(65) MG
325 TABLET ORAL
COMMUNITY

## 2024-04-23 NOTE — ASSESSMENT & PLAN NOTE
CT angiogram of head scheduled for 04/45  Will require follow up with neurosurgery after imaging is completed  Continue to control blood pressures  Goal: <130/80

## 2024-04-23 NOTE — ASSESSMENT & PLAN NOTE
Lab Results   Component Value Date    EGFR 78 09/28/2023    EGFR 74 09/26/2023    EGFR 79 09/25/2023    CREATININE 0.80 09/28/2023    CREATININE 0.83 09/26/2023    CREATININE 0.79 09/25/2023     Baseline creatinine 0.8-0.9  CMP on 04/18  Creatinine: 1.13  BUN: 28  eGFR: 54  Continue to monitor BMP periodically  Encourage fluids throughout the day  Avoid nephrotoxic medications  In setting of upcoming CT w/wo contrast to re-evaluate aneurysm, will hold metformin after CT, re-evaluate BMP after   Encourage increased fluid intake after CT

## 2024-04-23 NOTE — PROGRESS NOTES
[address], [address], [place] [address]  [phone]    NAME: Edie Salazar  AGE: 63 y.o. SEX: female    Progress Note    Location: [facility]  POS: 32 ([facility])  Code Status: Full Code    Chief complaint / Reason for visit:  Acute Visit    Assessment/Plan:    Stage 3a chronic kidney disease (HCC)  Lab Results   Component Value Date    EGFR 78 09/28/2023    EGFR 74 09/26/2023    EGFR 79 09/25/2023    CREATININE 0.80 09/28/2023    CREATININE 0.83 09/26/2023    CREATININE 0.79 09/25/2023     Baseline creatinine 0.8-0.9  CMP on 04/18  Creatinine: 1.13  BUN: 28  eGFR: 54  Continue to monitor BMP periodically  Encourage fluids throughout the day  Avoid nephrotoxic medications  In setting of upcoming CT w/wo contrast to re-evaluate aneurysm, will hold metformin after CT, re-evaluate BMP after   Encourage increased fluid intake after CT     Type 2 diabetes mellitus with hyperglycemia, with long-term current use of insulin (HCC)    Lab Results   Component Value Date    HGBA1C 10.2 (H) 08/24/2023     Recent HgA1c on 04/18/24 noted to be 8.2  Requires better control   Encourage CCD  Nursing staff that resident is non-compliant with diet  Reviewed blood sugars from the facility  Appears elevated  Increased insulin glargine and humalog  Insulin glargine 18 units SQ Q12 hours  Insulin lispro 7 units TID with meals  Continue to monitor  Avoid hypoglycemia    Aneurysm (HCC)  CT angiogram of head scheduled for 04/45  Will require follow up with neurosurgery after imaging is completed  Continue to control blood pressures  Goal: <130/80    Chronic migraine without aura without status migrainosus, not intractable  Continues on topamax 100 mg po daily at bedtime  No recent complaints of migraines  Appears stable     Polymyalgia rheumatica (HCC)  Stable  Continues on prednisone 4 mg po daily  Is not agreeable to dose reduction at this time  Was concerned that increased WBC, elevated blood  sugars, and increase in recent Hg A1c is a result of chronic prednisone use         This is a 63 y.o. female seen today at [facility].  Medical history includes, not limited to, type 2 DM, polymyalgia rheumatica, CKD stage 3A, hypertension, hyperlipidemia, and vitamin D deficiency.    Resident is seen today for an acute visit.  After reviewing her most recent labs, medication changes were addressed with the resident.  Upon entering the room she is out of bed in her wheelchair.  Alert and oriented x 3.  Addressed the need to increase her current insulin regimen to better control her diabetes.  She states the facility does not have her on the correct diet.  I reassured her that she was on a diabetic/no added salt diet.  She was agreeable to changes.      Also spoke to her about her upcoming CT scan and holding certain medications.    Reviewed resident with nursing staff.  Reviewed labs with new orders placed.           Nursing and prior provider notes reviewed on this visit. Discussed visit with PCP and nursing staff/ supervisor.      Review of Systems   Constitutional:  Positive for activity change. Negative for appetite change, fatigue, fever and unexpected weight change.   HENT:  Negative for congestion.    Eyes:  Negative for pain, discharge and visual disturbance.        Eyeglasses   Respiratory:  Negative for cough and shortness of breath.    Cardiovascular:  Negative for chest pain and palpitations.   Gastrointestinal:  Negative for abdominal pain, constipation and diarrhea.   Genitourinary:  Negative for difficulty urinating, dysuria, hematuria and urgency.   Musculoskeletal:  Positive for arthralgias and gait problem.   Skin:  Negative for color change.   Neurological:  Negative for syncope and weakness.   Psychiatric/Behavioral:  Positive for agitation. Negative for confusion and sleep disturbance.    All other systems reviewed and are negative.         ALLERGY: Reviewed, unchanged  No Known  Allergies    HISTORY:  Medical Hx: Reviewed, unchanged  Family Hx: Reviewed, unchanged  Soc Hx: Reviewed,  unchanged      Physical Exam  Vitals and nursing note reviewed.   Constitutional:       General: She is not in acute distress.     Appearance: Normal appearance. She is obese. She is not ill-appearing.   HENT:      Head: Normocephalic.      Right Ear: Tympanic membrane normal.      Left Ear: Tympanic membrane normal.      Nose: Nose normal. No congestion.      Mouth/Throat:      Mouth: Mucous membranes are moist.      Pharynx: Oropharynx is clear.   Eyes:      General:         Right eye: No discharge.         Left eye: No discharge.      Extraocular Movements: Extraocular movements intact.      Conjunctiva/sclera: Conjunctivae normal.      Pupils: Pupils are equal, round, and reactive to light.   Cardiovascular:      Rate and Rhythm: Normal rate and regular rhythm.      Heart sounds: Normal heart sounds.      Comments: Decreased pedal pulses  Pulmonary:      Effort: Pulmonary effort is normal. No respiratory distress.      Breath sounds: Normal breath sounds.   Chest:      Chest wall: No tenderness.   Abdominal:      General: Bowel sounds are normal.      Palpations: Abdomen is soft.      Tenderness: There is no abdominal tenderness.   Musculoskeletal:         General: No swelling. Normal range of motion.      Cervical back: Normal range of motion.      Right lower leg: Edema present.      Left lower leg: Edema present.   Skin:     General: Skin is warm and dry.   Neurological:      Mental Status: She is alert and oriented to person, place, and time. Mental status is at baseline.      Motor: No weakness.   Psychiatric:         Mood and Affect: Mood normal.         Behavior: Behavior normal.         Thought Content: Thought content normal.         Judgment: Judgment normal.            Laboratory results / Imaging reviewed: Hard copy/ies in medical chart:    Vitals:    04/19/24 2316   BP: 156/79   Pulse: 61  "  Resp: 18   Temp: 97.7 °F (36.5 °C)   SpO2: 99%       Current Medications:  All medications reviewed and updated in Nursing Home Chart    Please note: This note was completed in part utilizing a voice-recognition software may have been used in the preparation of this document. Grammatical errors, random word insertion, spelling mistakes, and incomplete sentences may be an occasional consequence of the system secondary to software limitations, ambient noise and hardware issues. Occasional wrong word or \"sound-alike\" substitutions may have occurred due to the inherent limitations of voice recognition software. At the time of dictation, efforts were made to edit, clarify and/or correct errors. Interpretation should be guided by context. Please read the chart carefully and recognize, using context, where substitutions have occurred. If you have any questions or concerns about the context, text or information contained within the body of this dictation, please contact myself, the provider, for further clarification.      CONCETTA Jimenez  4/23/2024  "

## 2024-04-23 NOTE — ASSESSMENT & PLAN NOTE
Continues on topamax 100 mg po daily at bedtime  No recent complaints of migraines  Appears stable

## 2024-04-23 NOTE — ASSESSMENT & PLAN NOTE
Stable  Continues on prednisone 4 mg po daily  Is not agreeable to dose reduction at this time  Was concerned that increased WBC, elevated blood sugars, and increase in recent Hg A1c is a result of chronic prednisone use

## 2024-04-23 NOTE — ASSESSMENT & PLAN NOTE
Lab Results   Component Value Date    HGBA1C 10.2 (H) 08/24/2023     Recent HgA1c on 04/18/24 noted to be 8.2  Requires better control   Encourage CCD  Nursing staff that resident is non-compliant with diet  Reviewed blood sugars from the facility  Appears elevated  Increased insulin glargine and humalog  Insulin glargine 18 units SQ Q12 hours  Insulin lispro 7 units TID with meals  Continue to monitor  Avoid hypoglycemia

## 2024-04-24 NOTE — ASSESSMENT & PLAN NOTE
No shortness of breath, not in exacerbation  Continue albuterol inhaler Q6h prn   Non-compliance in the past with Advair where she wasn't taking and stated she did not need it

## 2024-04-24 NOTE — PROGRESS NOTES
[address], [address], [place] [address]  [phone]    NAME: Edie Salazar  AGE: 63 y.o. SEX: female    Progress Note    Location: [facility]  POS: 32 ([facility])  Code Status: Full Code    Chief complaint / Reason for visit: Follow up visit    Assessment/Plan:    Hypertension  Encourage medication adherence   Continue daily blood pressure checks  Encourage heart healthy diet  Continue hydralazine 10 mg po TID  Continue losartan 100 mg po daily  Continue amlodipine 5 mg po daily  Continue metoprolol tartrate 100 mg Q12 hours  Blood pressures stable    Mild intermittent asthma without complication  No shortness of breath, not in exacerbation  Continue albuterol inhaler Q6h prn   Non-compliance in the past with Advair where she wasn't taking and stated she did not need it    Type 2 diabetes mellitus with hyperglycemia, with long-term current use of insulin ([facility])    Lab Results   Component Value Date    HGBA1C 10.2 (H) 08/24/2023     Recent HgA1c on 04/18/24 noted to be 8.2  Requires better control   Encourage [facility]  Nursing staff that resident is non-compliant with diet  Reviewed blood sugars from the facility  Appears elevated  Increased insulin glargine and humalog  Insulin glargine 18 units SQ Q12 hours  Insulin lispro 7 units TID with meals  Continue to monitor  Avoid hypoglycemia    Diabetic foot ulcer ([facility])  Had followed with [facility]  Wounds have healed  WBAT  Inspect bilateral feet daily  Avoid tight fitting shoes  Follow with in house podiatry  Appointment with [facility] to be fitted for diabetic shoes on May 22    Depressed mood  Able to express needs  Expresses that she would like to move into an apartment  Continue supportive care while at long term facility  Encourage participation in group activities   Continue to monitor     Iron deficiency anemia secondary to inadequate dietary iron intake  Hemoglobin stable on 04/18 at 10.4  g/dL  TIBC profile checked on 11/13/23  Iron: 33  Transferrin: 207  % saturation: 11  TIBC: 290  Was started on ferrous sulfate 325 mg every other day on 11/29  Recheck TIBC on next lab work  Adjust iron supplementation as needed    Gait instability  Moves around mainly in a wheelchair  Able to use walker  for short distances and if she is not carrying anything  Working with facility for  Clinic appointment to be fitted for diabetic shoes  Appointment set for May 22  Fall precautions  Continue PT/OT as needed          This is a 63 y.o. female seen today at [facility].  Medical history includes, not limited to, type 2 DM, polymyalgia rheumatica, CKD stage 3A, hypertension, hyperlipidemia, and vitamin D deficiency.    Resident is seen today for a routine follow up visit.  Upon entering the room she is out of bed in her wheelchair.  Alert and oriented x 3.  She states things have been going well.  Her goals still include moving to an apartment.  She currently denies pain.  States she has been eating and drinking well.  She is aware of her upcoming CT scan, also informed her of an appointment in May to be fitted for diabetic shoes.        Reviewed resident with nursing staff.  Reviewed labs with new orders placed.           Nursing and prior provider notes reviewed on this visit. Discussed visit with PCP and nursing staff/ supervisor.      Review of Systems   Constitutional:  Positive for activity change. Negative for appetite change, fatigue, fever and unexpected weight change.   HENT:  Negative for congestion.    Eyes:  Negative for pain, discharge and visual disturbance.        Eyeglasses   Respiratory:  Negative for cough and shortness of breath.    Cardiovascular:  Negative for chest pain and palpitations.   Gastrointestinal:  Negative for abdominal pain, constipation and diarrhea.   Genitourinary:  Negative for difficulty urinating, dysuria, hematuria and urgency.   Musculoskeletal:  Positive for  arthralgias and gait problem.   Skin:  Negative for color change.   Neurological:  Negative for syncope and weakness.   Psychiatric/Behavioral:  Positive for agitation. Negative for confusion and sleep disturbance.    All other systems reviewed and are negative.         ALLERGY: Reviewed, unchanged  No Known Allergies    HISTORY:  Medical Hx: Reviewed, unchanged  Family Hx: Reviewed, unchanged  Soc Hx: Reviewed,  unchanged      Physical Exam  Vitals and nursing note reviewed.   Constitutional:       General: She is not in acute distress.     Appearance: Normal appearance. She is obese. She is not ill-appearing.   HENT:      Head: Normocephalic.      Right Ear: Tympanic membrane normal.      Left Ear: Tympanic membrane normal.      Nose: Nose normal. No congestion.      Mouth/Throat:      Mouth: Mucous membranes are moist.      Pharynx: Oropharynx is clear.   Eyes:      General:         Right eye: No discharge.         Left eye: No discharge.      Extraocular Movements: Extraocular movements intact.      Conjunctiva/sclera: Conjunctivae normal.      Pupils: Pupils are equal, round, and reactive to light.   Cardiovascular:      Rate and Rhythm: Normal rate and regular rhythm.      Heart sounds: Normal heart sounds.      Comments: Decreased pedal pulses  Pulmonary:      Effort: Pulmonary effort is normal. No respiratory distress.      Breath sounds: Normal breath sounds.   Chest:      Chest wall: No tenderness.   Abdominal:      General: Bowel sounds are normal.      Palpations: Abdomen is soft.      Tenderness: There is no abdominal tenderness.   Musculoskeletal:         General: No swelling. Normal range of motion.      Cervical back: Normal range of motion.      Right lower leg: Edema present.      Left lower leg: Edema present.   Skin:     General: Skin is warm and dry.   Neurological:      Mental Status: She is alert and oriented to person, place, and time. Mental status is at baseline.      Motor: No weakness.  "  Psychiatric:         Mood and Affect: Mood normal.         Behavior: Behavior normal.         Thought Content: Thought content normal.         Judgment: Judgment normal.            Laboratory results / Imaging reviewed: Hard copy/ies in medical chart:    Vitals:    04/23/24 2054   BP: 156/79   Pulse: 61   Resp: 18   Temp: 97.7 °F (36.5 °C)   SpO2: 99%       Current Medications:  All medications reviewed and updated in Nursing Home Chart    Please note: This note was completed in part utilizing a voice-recognition software may have been used in the preparation of this document. Grammatical errors, random word insertion, spelling mistakes, and incomplete sentences may be an occasional consequence of the system secondary to software limitations, ambient noise and hardware issues. Occasional wrong word or \"sound-alike\" substitutions may have occurred due to the inherent limitations of voice recognition software. At the time of dictation, efforts were made to edit, clarify and/or correct errors. Interpretation should be guided by context. Please read the chart carefully and recognize, using context, where substitutions have occurred. If you have any questions or concerns about the context, text or information contained within the body of this dictation, please contact myself, the provider, for further clarification.      CONCETTA Jimenez  4/23/2024  "

## 2024-04-24 NOTE — ASSESSMENT & PLAN NOTE
Moves around mainly in a wheelchair  Able to use walker  for short distances and if she is not carrying anything  Working with facility for  Clinic appointment to be fitted for diabetic shoes  Appointment set for May 22  Fall precautions  Continue PT/OT as needed

## 2024-04-24 NOTE — ASSESSMENT & PLAN NOTE
Able to express needs  Expresses that she would like to move into an apartment  Continue supportive care while at long term facility  Encourage participation in group activities   Continue to monitor

## 2024-04-24 NOTE — ASSESSMENT & PLAN NOTE
Had followed with [facility]  Wounds have healed  WBAT  Inspect bilateral feet daily  Avoid tight fitting shoes  Follow with in house podiatry  Appointment with [facility] to be fitted for diabetic shoes on May 22

## 2024-04-24 NOTE — ASSESSMENT & PLAN NOTE
Hemoglobin stable on 04/18 at 10.4 g/dL  TIBC profile checked on 11/13/23  Iron: 33  Transferrin: 207  % saturation: 11  TIBC: 290  Was started on ferrous sulfate 325 mg every other day on 11/29  Recheck TIBC on next lab work  Adjust iron supplementation as needed

## 2024-04-25 ENCOUNTER — HOSPITAL ENCOUNTER (OUTPATIENT)
Dept: CT IMAGING | Facility: HOSPITAL | Age: 64
Discharge: HOME | End: 2024-04-25
Payer: COMMERCIAL

## 2024-04-25 DIAGNOSIS — I67.1 LEFT CAVERNOUS CAROTID ANEURYSM: ICD-10-CM

## 2024-04-25 PROCEDURE — 70496 CT ANGIOGRAPHY HEAD: CPT

## 2024-04-25 RX ADMIN — IOHEXOL 85 ML: 350 INJECTION, SOLUTION INTRAVENOUS at 13:04

## 2024-05-01 ENCOUNTER — TELEPHONE (OUTPATIENT)
Dept: NEUROSURGERY | Facility: CLINIC | Age: 64
End: 2024-05-01

## 2024-05-01 ENCOUNTER — OFFICE VISIT (OUTPATIENT)
Dept: NEUROSURGERY | Facility: CLINIC | Age: 64
End: 2024-05-01
Payer: COMMERCIAL

## 2024-05-01 VITALS
HEART RATE: 62 BPM | HEIGHT: 69 IN | TEMPERATURE: 98 F | BODY MASS INDEX: 42.72 KG/M2 | RESPIRATION RATE: 16 BRPM | OXYGEN SATURATION: 97 % | DIASTOLIC BLOOD PRESSURE: 78 MMHG | SYSTOLIC BLOOD PRESSURE: 156 MMHG

## 2024-05-01 DIAGNOSIS — I67.1 ANEURYSM OF INTERNAL CAROTID ARTERY: Primary | ICD-10-CM

## 2024-05-01 DIAGNOSIS — I72.9 ANEURYSM (HCC): ICD-10-CM

## 2024-05-01 DIAGNOSIS — E66.01 MORBID OBESITY (HCC): ICD-10-CM

## 2024-05-01 PROCEDURE — 1124F ACP DISCUSS-NO DSCNMKR DOCD: CPT | Performed by: NURSE PRACTITIONER

## 2024-05-01 PROCEDURE — 99214 OFFICE O/P EST MOD 30 MIN: CPT | Performed by: NURSE PRACTITIONER

## 2024-05-01 RX ORDER — BISACODYL 10 MG
10 SUPPOSITORY, RECTAL RECTAL AS NEEDED
COMMUNITY

## 2024-05-01 RX ORDER — ACETAMINOPHEN 650 MG/1
650 SUPPOSITORY RECTAL EVERY 4 HOURS PRN
COMMUNITY

## 2024-05-01 NOTE — PATIENT INSTRUCTIONS
Give patient family letters for aneurysm need to give to children. They are a risk for developing   secondary has  2 other relatives with aneurysm sat risk     Next CTA head w/wo contrast due  4/25/2026     Office appointment 3 days to one week after completion

## 2024-05-01 NOTE — TELEPHONE ENCOUNTER
Recall set in epic for a 2 year follow up CTA Head w/wo 4/25/2026 and F/u office 3 days to one week post completion ---Rosangela. Provided at time of check out AVS office consult and orders for CTA Head. Also gave patient a copy the letter with information on aneurysm for her family members. Faxed orders, AVS and consult note to [facility] attn: Shona at [phone] contacted her and advised of fax.

## 2024-05-01 NOTE — ASSESSMENT & PLAN NOTE
As addressed in HPI.  She presents today as a follow-up visit from [facility] via a wheelchair and accompanied by facility staff.  Her main complaint today is the swelling in her legs.  Identified risk factors for aneurysm growth and rupture   HTN --- history of treating with hypertensive medications blood pressure today 156/78  HLD -tree of treating with a atorvastatin 40 mg  Tobacco dependence  -denies smoking  Alcohol-denies use  Admits she has ongoing follow-up with PCP for risk factor modification, management of identified comorbid conditions.   2 or more first degree relatives diagnosed with known brain aneurysms or have  suddenly of an unknown cause or brain bleed- At the initial consult visit she reported no family history of aneurysmal disease. She reports today a family history of a sister with an aneurysm who is since passed from COVID 19.     Imagining  2024 CT of head with and without contrastStable 3-4 mm aneurysms involving the mid and distal left cavernous carotid artery. -No acute intracranial abnormality.    Plan   Reviewed Imagining with patient    Explained the aneurysm is likely unrelated  to her current complaints.  Extensively discussed natural nature of aneurysms.    Discussed given current size and location risk of rupture is less than 1%/year per UCAS and <1%/5yrs per ISIUA.    Discussed modifiable risk factors including hypertension, hyperlipidemia, and smoking--as above .  Discussed family history as above.  Discussed signs and symptoms of aneurysm rupture including severe and sudden headache (WHOL), neck pain, nausea and vomiting, and seizure,visual disturbances, such as loss of vision or double vision, pain above or around your eye,numbness or weakness on 1 side of your face,difficulty speaking, loss of balance, difficulty concentrating or problems with short-term memory.   neck pain. Reiterated that the symptoms should prompt patient to visit in  emergency department immediately.   Ordered f/u imagining CTA Head w/wo due 4/25/2026 . T early to order labs.   Scheduled f/u appointment 3 days to 1 week post imagining completion as joint with Dr. Ash and I.  Advised if he/she has additional questions or concerns to please contact the neurosurgery office.    AVS teaching aneurysm, low salt and fat diet

## 2024-05-01 NOTE — PROGRESS NOTES
Assessment/Plan:    Aneurysm of internal carotid artery  As addressed in HPI.  She presents today as a follow-up visit from [facility] via a wheelchair and accompanied by facility staff.  Her main complaint today is the swelling in her legs.  Identified risk factors for aneurysm growth and rupture   HTN --- history of treating with hypertensive medications blood pressure today 156/78  HLD -tree of treating with a atorvastatin 40 mg  Tobacco dependence  -denies smoking  Alcohol-denies use  Admits she has ongoing follow-up with PCP for risk factor modification, management of identified comorbid conditions.   2 or more first degree relatives diagnosed with known brain aneurysms or have  suddenly of an unknown cause or brain bleed- At the initial consult visit she reported no family history of aneurysmal disease. She reports today a family history of a sister with an aneurysm who is since passed from COVID 19.     Imagining  2024 CT of head with and without contrastStable 3-4 mm aneurysms involving the mid and distal left cavernous carotid artery. -No acute intracranial abnormality.    Plan   Reviewed Imagining with patient    Explained the aneurysm is likely unrelated  to her current complaints.  Extensively discussed natural nature of aneurysms.    Discussed given current size and location risk of rupture is less than 1%/year per UCAS and <1%/5yrs per ISIUA.    Discussed modifiable risk factors including hypertension, hyperlipidemia, and smoking--as above .  Discussed family history as above.  Discussed signs and symptoms of aneurysm rupture including severe and sudden headache (WHOL), neck pain, nausea and vomiting, and seizure,visual disturbances, such as loss of vision or double vision, pain above or around your eye,numbness or weakness on 1 side of your face,difficulty speaking, loss of balance, difficulty concentrating or problems with short-term memory.   neck pain. Reiterated  that the symptoms should prompt patient to visit in emergency department immediately.   Ordered f/u imagining CTA Head w/wo due 4/25/2026 . T early to order labs.   Scheduled f/u appointment 3 days to 1 week post imagining completion as joint with Dr. Ash and I.  Advised if he/she has additional questions or concerns to please contact the neurosurgery office.    AVS teaching aneurysm, low salt and fat diet     Morbid obesity (HCC)  BMI 42.72          Subjective: 2-year follow-up visit intracranial aneurysms.    Patient ID: Edie Salazar is a 63 y.o. female PM/SH: type 2 diabetes, asthma, obstructive sleep apnea, hypertension, migraines, hyperlipidemia, PMR, and obesity.  Patient seen in outpatient office today for 1 year follow-up for left cavernous aneurysms.hypertension ,diabetes with diabetic retinopathy status post transplant over 10 years ago and polymyalgia rheumatica giant cell arteritis       HPI   [facility] with admission 9/16/19 through 9/23/19 as part of her evaluation with complaint of eadache and multiple other complaints.  She underwent a CTA of her head 9/20/19 incidental note was made at that time of a left internal carotid artery aneurysm.    2/10/2022 presented in the ED-2/12/202020 who was sent from this office for elevated blood pressure.  She had a history of intracranial aneurysms initially found back in September 2019 and chronic headaches that have been worsening.     9/23/2019 MRA of the head without contrast--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.  The proximal lesion approaches 6 x 2.6 mm, distal lesion 4 x 3 mm.  No intradural aneurysm.   Right A1 segment markedly hypoplastic, dominant flow to the right RAUL through the anterior communicating artery.   9/20/3019 CTA H/N w/wo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.  The flow void in T2 sequence in same date MRI is not as  pronounced as seen in CTA.  There might be some venous component.  Therefore MR angiogram is recommended for further evaluation. 4.  Unremarkable CT angiogram of the neck.     She consulted with Dr. Ash 3/18/2020 with plan for 6-month follow-up CTA.  Instead return to the office as a 1 year follow-up visit.  At the initial consult visit she reported no family history of aneurysmal disease, smoking, or drinking, or illicit drugs.  She reports today a family history of a sister with an aneurysm who is since passed from COVID 19.  Her last office visit was 3/16/2022 for 1 year follow-up visit, it was determined for follow-up in 2 years.  She presents today for a 2-year follow-up visit for ongoing imaging surveillance of intracranial aneurysms.            REVIEW OF SYSTEMS  Review of Systems   HENT:  Negative for tinnitus.    Eyes:         Legally blind   Respiratory:  Negative for chest tightness and shortness of breath.    Cardiovascular:  Positive for leg swelling (B/L).   Gastrointestinal: Negative.    Genitourinary: Negative.    Musculoskeletal:  Positive for gait problem (presents in wheel chair, states able to walk with walker  without assistance).   Skin:  Negative for rash and wound.   Neurological:  Positive for headaches (occasional, moderate, frontal). Negative for dizziness, tremors, seizures, syncope, speech difficulty, weakness, light-headedness and numbness.   Hematological:  Does not bruise/bleed easily.        Eliquis   Psychiatric/Behavioral:  Negative for confusion and decreased concentration.          Meds/Allergies     Current Outpatient Medications   Medication Sig Dispense Refill    acetaminophen (TYLENOL) 325 mg tablet Take 650 mg by mouth every 4 (four) hours as needed for mild pain      acetaminophen (TYLENOL) 650 mg suppository Insert 650 mg into the rectum every 4 (four) hours as needed for mild pain      amLODIPine (NORVASC) 5 mg tablet Take 1 tablet (5 mg total) by mouth daily 30 tablet  3    ammonium lactate (LAC-HYDRIN) 12 % cream Apply 1 Application topically as needed for dry skin      apixaban (ELIQUIS) 5 mg Take 1 tablet (5 mg total) by mouth 2 (two) times a day Do not start before August 17, 2023.  0    atorvastatin (LIPITOR) 40 mg tablet Take 1 tablet (40 mg total) by mouth daily 90 tablet 3    bisacodyl (DULCOLAX) 10 mg suppository Insert 10 mg into the rectum as needed for constipation      cholecalciferol (VITAMIN D3) 1,000 units tablet Take 2 tablets (2,000 Units total) by mouth daily 60 tablet 2    docusate sodium (COLACE) 100 mg capsule Take 1 capsule (100 mg total) by mouth 2 (two) times a day 60 capsule 0    ferrous sulfate 325 (65 Fe) mg tablet Take 325 mg by mouth every other day with breakfast      gabapentin (NEURONTIN) 100 mg capsule Take 1 capsule (100 mg total) by mouth 2 (two) times a day 60 capsule 3    hydrALAZINE (APRESOLINE) 10 mg tablet Take 1 tablet (10 mg total) by mouth 3 (three) times a day 90 tablet 3    insulin glargine (LANTUS) 100 units/mL subcutaneous injection Inject 18 Units under the skin every 12 (twelve) hours 10 mL 0    insulin lispro (HumaLOG KwikPen) 100 units/mL injection pen Inject 7 Units under the skin 3 (three) times a day with meals      losartan (COZAAR) 100 MG tablet Take 1 tablet (100 mg total) by mouth daily 30 tablet 5    Magnesium Hydroxide (MILK OF MAGNESIA PO) Take 30 mL by mouth as needed      Menthol, Topical Analgesic, (Biofreeze) 4 % GEL Apply 1 Application topically every 6 (six) hours as needed      metFORMIN (GLUCOPHAGE) 1000 MG tablet Take 1 tablet (1,000 mg total) by mouth 2 (two) times a day with meals 60 tablet 3    metoprolol tartrate (LOPRESSOR) 100 mg tablet Take 1 tablet (100 mg total) by mouth every 12 (twelve) hours 60 tablet 3    multivitamin-iron-minerals-folic acid (THERAPEUTIC-M) TABS tablet Take 1 tablet by mouth daily 30 tablet 2    predniSONE 1 mg tablet Take 4 tablets (4 mg total) by mouth daily 120 tablet 0     Sodium Phosphates (ENEMA RE) Insert into the rectum as needed      topiramate (TOPAMAX) 100 mg tablet Take 1 tablet (100 mg total) by mouth daily at bedtime (Patient taking differently: Take 100 mg by mouth daily at bedtime) 30 tablet 0    albuterol (PROVENTIL HFA,VENTOLIN HFA) 90 mcg/act inhaler Inhale 2 puffs every 6 (six) hours as needed for wheezing (Patient not taking: Reported on 5/1/2024)      Blood Glucose Monitoring Suppl (OneTouch Verio Reflect) w/Device KIT Use 1 each 4 (four) times a day (Patient not taking: Reported on 9/11/2023) 1 kit 0    Blood Pressure Monitor KIT Check blood pressures BID (Patient not taking: Reported on 9/11/2023) 1 kit 0    Insulin Pen Needle (BD Pen Needle Tita 2nd Gen) 32G X 4 MM MISC For use with insulin pen. Pharmacy may dispense brand covered by insurance. (Patient not taking: Reported on 9/11/2023) 100 each 0    Insulin Pen Needle (BD Pen Needle Tita U/F) 32G X 4 MM MISC Use four times daily as directed with insulin pen (Patient not taking: Reported on 9/11/2023) 200 each 3    Lancets (OneTouch Delica Plus [id number]) MISC Use 1 each 4 (four) times a day (Patient not taking: Reported on 9/11/2023) 200 each 2     No current facility-administered medications for this visit.       No Known Allergies    PAST HISTORY    Past Medical History:   Diagnosis Date    Anemia     Benign hypertension     Procedure/Onset: 01/01/2005; Description: BP elevated today. Pt reports running out of BP meds 2wks ago. Will resume BP meds.    Diabetes mellitus (HCC)     Diabetes mellitus type 2 with complications, uncontrolled 9/8/2015    Dyspnea on exertion     Hyperlipidemia     Hypertension     Legally blind 2010    Miscarriage     x 3    Polymyalgia rheumatica (HCC) 11/24/2021    Seizures (HCC)     Sickle cell trait (HCC)     Stage 3a chronic kidney disease (HCC) 5/19/2022    Thyroid nodule 3/6/2020       Past Surgical History:   Procedure Laterality Date    CATARACT EXTRACTION Bilateral      "august and september    EYE SURGERY      HYSTERECTOMY      37    OOPHORECTOMY Left     37    NE LIGATION/BIOPSY TEMPORAL ARTERY Bilateral 10/17/2019    Procedure: BIOPSY ARTERY TEMPORAL;  Surgeon: Bentley Reyes DO;  Location: [facility];  Service: Vascular    US GUIDED THYROID BIOPSY  5/13/2020       Social History     Tobacco Use    Smoking status: Never    Smokeless tobacco: Never   Vaping Use    Vaping status: Never Used   Substance Use Topics    Alcohol use: Never     Alcohol/week: 0.0 standard drinks of alcohol    Drug use: No       Family History   Problem Relation Age of Onset    Heart attack Mother     Heart disease Mother     Hypertension Mother     No Known Problems Father     Heart disease Sister     No Known Problems Sister     No Known Problems Sister     No Known Problems Daughter     Heart attack Maternal Grandmother     Heart disease Maternal Grandmother     No Known Problems Brother     No Known Problems Son     No Known Problems Son     No Known Problems Paternal Aunt     Diabetes Other     Heart attack Other     Cancer Neg Hx     Stroke Neg Hx        The following portions of the patient's history were reviewed and updated as appropriate: allergies, current medications, past family history, past medical history, past social history, past surgical history and problem list.      EXAM    Vitals:Blood pressure 156/78, pulse 62, temperature 98 °F (36.7 °C), temperature source Temporal, resp. rate 16, height 5' 8.5\" (1.74 m), SpO2 97%, not currently breastfeeding.,Body mass index is 42.72 kg/m².     Physical Exam  Vitals and nursing note reviewed. Exam conducted with a chaperone present (Staff person from nursing home.).   Constitutional:       General: She is not in acute distress.     Appearance: She is not ill-appearing, toxic-appearing or diaphoretic.   HENT:      Head: Normocephalic and atraumatic.   Eyes:      General: No scleral icterus.        Right eye: No discharge.         Left eye: No " discharge.      Conjunctiva/sclera: Conjunctivae normal.   Pulmonary:      Effort: Pulmonary effort is normal. No respiratory distress.   Musculoskeletal:      Right lower leg: Edema present.      Left lower leg: Edema present.   Skin:     Comments: Small blistering of lower extremities and shins more pronounced in the right lower extremity   Neurological:      Mental Status: She is alert.      Sensory: Sensory deficit present.      Motor: Weakness present.      Gait: Gait abnormal.   Psychiatric:         Mood and Affect: Mood normal.         Speech: Speech normal.         Behavior: Behavior normal.         Neurologic Exam     Mental Status   Oriented to person.   Disoriented to place.   Disoriented to time. Disoriented to year, month, date, day and season.   Attention: normal. Concentration: normal.   Speech: speech is normal (She is very well spoken, proper.)  Level of consciousness: alert  Able to perform simple calculations.     Motor Exam   Muscle bulk: decreased  Overall muscle tone: normal    Strength   Right deltoid: 5/5  Left deltoid: 5/5  Right biceps: 5/5  Left biceps: 5/5  Right triceps: 5/5  Left triceps: 5/5  Right wrist flexion: 5/5  Left wrist flexion: 5/5  Right wrist extension: 5/5  Left wrist extension: 5/5  Right interossei: 4/5  Left interossei: 4/5  Right quadriceps: 4/5  Left quadriceps: 4/5  Right hamstrin/5  Left hamstrin/5  Right glutei: 4/5  Left glutei: 4/5  Right anterior tibial: 4/5  Left anterior tibial: 4/5  Right posterior tibial: 4/5  Right gastroc: 4/5  Left gastroc: 4/5    Sensory Exam   Right leg light touch: decreased from ankle  Left leg light touch: decreased from ankle    Gait, Coordination, and Reflexes     Gait  Gait: (Wheelchair mobility)      Imaging Studies  2024 CTA head w wo contrast Result Date: 2024  Narrative: CTA BRAIN WITH CONTRAST INDICATION: I67.1: Cerebral aneurysm, nonruptured COMPARISON:   CT head from 2023, CTA head from 3/7/2022  TECHNIQUE:   Post contrast imaging was performed after administration of iodinated contrast through the neck and brain. Post contrast axial 0.625 mm images timed to opacify the arterial system. 3D rendering was performed on an independent workstation.   MIP reconstructions performed. Coronal reconstructions were performed of the noncontrast portion of the brain. Radiation dose length product (DLP) for this visit:  1319 mGy-cm .  This examination, like all CT scans performed in the [facility], was performed utilizing techniques to minimize radiation dose exposure, including the use of iterative reconstruction and automated exposure control. IV Contrast:  85 mL of iohexol (OMNIPAQUE) IMAGE QUALITY:   Diagnostic FINDINGS: NONCONTRAST BRAIN: PARENCHYMA: Decreased attenuation is noted in periventricular and subcortical white matter demonstrating an appearance that is statistically most likely to represent mild microangiopathic change. No CT signs of acute infarction.  No intracranial mass, mass effect or midline shift.  No acute parenchymal hemorrhage. VENTRICLES AND EXTRA-AXIAL SPACES: Stable increased size of the ventricles with effacement of the sulci at the vertex and prominence of the sylvian fissures, which could represent normal pressure hydrocephalus in the correct clinical setting. No extra-axial hemorrhage. VISUALIZED ORBITS: Sequela bilateral cataract surgery. PARANASAL SINUSES: Normal visualized paranasal sinuses. INTRACRANIAL VASCULATURE INTERNAL CAROTID ARTERIES: Scattered atherosclerotic disease of the cavernous segments bilaterally resulting in mild to moderate stenosis. No high-grade stenosis. Left ICA is dominant related to prominent ipsilateral anterior cerebral artery. Redemonstrated 2 small aneurysms measuring approximately 3 to 4 mm in the greatest linear dimension including blisterlike aneurysm involving the lateral wall of the mid left cavernous ICA (5:193) and an aneurysm which  descends medially at the junction of  the distal left cavernous and supraclinoid ICA (5:190). ANTERIOR CIRCULATION: Dominant left A1 segment and hypoplastic right A1 segment. Symmetric anterior cerebral arteries with normal enhancement.  Normal anterior communicating artery. MIDDLE CEREBRAL ARTERY CIRCULATION:  M1 segment and middle cerebral artery branches demonstrate normal enhancement bilaterally. DISTAL VERTEBRAL ARTERIES:  Normal distal vertebral arteries. Left AICA/PICA and right posterior inferior cerebellar artery origin is normal. BASILAR ARTERY:  Basilar artery is normal in caliber.  Normal superior cerebellar arteries. POSTERIOR CEREBRAL ARTERIES: Both posterior cerebral arteries arises from the basilar tip.  Both arteries demonstrate normal enhancement.   Posterior communicating arteries are not visualized and may be hypoplastic or absent. DURAL VENOUS SINUSES:  Normal. NON VASCULAR ANATOMY BONY STRUCTURES:  No acute osseous abnormality.     Impression: -Stable 3-4 mm aneurysms involving the mid and distal left cavernous carotid artery. -No acute intracranial abnormality. Expansion of the ventricles and CSF spaces out of proportion to degree of atrophy, which could represent normal pressure hydrocephalus in the correct clinical setting. Clinical correlation advised. Workstation performed: [id number]       I have personally reviewed pertinent reports.   and I have personally reviewed pertinent films in PACS    I have spent a total time of 35 minutes on 05/01/24 in caring for this patient including Diagnostic results, Risks and benefits of tx options, Instructions for management, Patient and family education, Importance of tx compliance, Risk factor reductions, Impressions, Counseling / Coordination of care, Documenting in the medical record, Reviewing / ordering tests, medicine, procedures  , Obtaining or reviewing history  , and Communicating with other healthcare professionals .     PLEASE  NOTE:  This encounter may have been completed utilizing the [employer] Direct Speech Voice Recognition Software. Grammatical errors, random word insertions, pronoun errors and incomplete sentences are occasional consequences of the system due to software limitations, ambient noise and hardware issues.These may be missed even after proof reading prior to affixing electronic signature. Please do not hesitate to contact me directly if you have any questions or concerns about the content, text or information contained within the body of this dictation.

## 2024-05-10 ENCOUNTER — NURSING HOME VISIT (OUTPATIENT)
Dept: GERIATRICS | Facility: OTHER | Age: 64
End: 2024-05-10
Payer: COMMERCIAL

## 2024-05-10 VITALS
SYSTOLIC BLOOD PRESSURE: 152 MMHG | WEIGHT: 282.1 LBS | HEART RATE: 72 BPM | BODY MASS INDEX: 42.27 KG/M2 | DIASTOLIC BLOOD PRESSURE: 74 MMHG

## 2024-05-10 DIAGNOSIS — R26.2 AMBULATORY DYSFUNCTION: ICD-10-CM

## 2024-05-10 DIAGNOSIS — I27.82 CHRONIC PULMONARY EMBOLISM, UNSPECIFIED PULMONARY EMBOLISM TYPE, UNSPECIFIED WHETHER ACUTE COR PULMONALE PRESENT (HCC): ICD-10-CM

## 2024-05-10 DIAGNOSIS — E78.5 HYPERLIPIDEMIA, UNSPECIFIED HYPERLIPIDEMIA TYPE: ICD-10-CM

## 2024-05-10 DIAGNOSIS — E11.42 DIABETIC POLYNEUROPATHY ASSOCIATED WITH TYPE 2 DIABETES MELLITUS (HCC): ICD-10-CM

## 2024-05-10 DIAGNOSIS — D50.8 IRON DEFICIENCY ANEMIA SECONDARY TO INADEQUATE DIETARY IRON INTAKE: ICD-10-CM

## 2024-05-10 DIAGNOSIS — N18.31 STAGE 3A CHRONIC KIDNEY DISEASE (HCC): ICD-10-CM

## 2024-05-10 DIAGNOSIS — L97.502 DIABETIC FOOT ULCER ASSOCIATED WITH TYPE 2 DIABETES MELLITUS, WITH FAT LAYER EXPOSED, UNSPECIFIED LATERALITY, UNSPECIFIED PART OF FOOT (HCC): Primary | ICD-10-CM

## 2024-05-10 DIAGNOSIS — E11.65 TYPE 2 DIABETES MELLITUS WITH HYPERGLYCEMIA, WITH LONG-TERM CURRENT USE OF INSULIN (HCC): ICD-10-CM

## 2024-05-10 DIAGNOSIS — G43.709 CHRONIC MIGRAINE WITHOUT AURA WITHOUT STATUS MIGRAINOSUS, NOT INTRACTABLE: ICD-10-CM

## 2024-05-10 DIAGNOSIS — M35.3 POLYMYALGIA RHEUMATICA (HCC): ICD-10-CM

## 2024-05-10 DIAGNOSIS — I72.9 ANEURYSM (HCC): ICD-10-CM

## 2024-05-10 DIAGNOSIS — R26.81 GAIT INSTABILITY: ICD-10-CM

## 2024-05-10 DIAGNOSIS — E11.621 DIABETIC FOOT ULCER ASSOCIATED WITH TYPE 2 DIABETES MELLITUS, WITH FAT LAYER EXPOSED, UNSPECIFIED LATERALITY, UNSPECIFIED PART OF FOOT (HCC): Primary | ICD-10-CM

## 2024-05-10 DIAGNOSIS — Z79.4 TYPE 2 DIABETES MELLITUS WITH HYPERGLYCEMIA, WITH LONG-TERM CURRENT USE OF INSULIN (HCC): ICD-10-CM

## 2024-05-10 DIAGNOSIS — J45.20 MILD INTERMITTENT ASTHMA WITHOUT COMPLICATION: ICD-10-CM

## 2024-05-10 PROCEDURE — 99316 NF DSCHRG MGMT 30 MIN+: CPT

## 2024-05-10 NOTE — PROGRESS NOTES
[address], [address], [place] [address]  [phone]    NAME: Edie Salazar  AGE: 63 y.o. SEX: female    Discharge Summary    Location: [facility]  POS: 32 ([facility])  Code Status: Full Code      Admission Date:09/30/2023  Discharge Date:05/13/2024      Chief complaint / Reason for visit: Discharge    Assessment/Plan:    Hyperlipidemia  Continue atorvastatin 40 mg po daily  Encourage heart healthy diet  Recommend follow up with PCP to monitor lab work    Polymyalgia rheumatica (HCC)  Stable  Continues on prednisone 4 mg po daily  Is not agreeable to dose reduction at this time  Was concerned that increased WBC, elevated blood sugars, and increase in recent Hg A1c is a result of chronic prednisone use   Recommend follow up with Rheumatologist    Ambulatory dysfunction  Cleared by podiatry to be WBAT after wounds on bilateral feet have healed   Encourage use of assistive device  Continue to use wheelchair  Goal to use walker  Continue fall precautions  Encourage patient to participate with activities  Provide education for patient, family, and caregivers    Gait instability  Moves around mainly in a wheelchair  Able to use walker  for short distances and if she is not carrying anything  Fall precautions  Recommend being fitted for diabetic shoes    Iron deficiency anemia secondary to inadequate dietary iron intake  Hemoglobin stable on 04/18 at 10.4 g/dL  TIBC profile checked on 04/29/24  Iron: 36  Transferrin: 171  % saturation: 15  TIBC: 239  Continue ferrous sulfate 325 mg every other day  Follow up with PCP    Stage 3a chronic kidney disease (HCC)  Lab Results   Component Value Date    EGFR 78 09/28/2023    EGFR 74 09/26/2023    EGFR 79 09/25/2023    CREATININE 0.80 09/28/2023    CREATININE 0.83 09/26/2023    CREATININE 0.79 09/25/2023     Baseline creatinine 0.8-0.9  CMP on 05/01  Creatinine: 0.94  BUN: 22  eGFR: 67  Continue to monitor BMP periodically  Encourage fluids  throughout the day  Avoid nephrotoxic medications    Diabetic foot ulcer (HCC)  Had followed with [facility]  Wounds have healed  WBAT  Inspect bilateral feet daily  Avoid tight fitting shoes  Recommend being fitted for diabetic shoes  On day of discharge there was an ulcer on left heel noted by nursing staff, fluid filled blister and drainage  Resident refused treatment and still wanted to leave facility   Recommend following up with PCP and podiatrist     Type 2 diabetes mellitus with hyperglycemia, with long-term current use of insulin ([facility])    Lab Results   Component Value Date    HGBA1C 10.2 (H) 08/24/2023     Recent HgA1c on 04/18/24 noted to be 8.2  Requires better control   Encourage CCD  Nursing staff that resident is non-compliant with diet  Reviewed blood sugars from the facility  Appears elevated  Increased insulin glargine and humalog  Insulin glargine 18 units SQ Q12 hours  Insulin lispro 7 units TID with meals  Continue to monitor  Avoid hypoglycemia    Diabetic neuropathy (HCC)  Wound noted on left heel on day of discharge  Resident refused treatment to ulcer  Need to ensure proper footwear  Continue gabapentin 100 mg po BID  Continue education to resident with checking feet daily    Mild intermittent asthma without complication  No shortness of breath, not in exacerbation  Continue albuterol inhaler Q6h prn   Non-compliance in the past with Advair where she wasn't taking and stated she did not need it  Follow up with Pulmonologist     Aneurysm ([facility])  04/30/24 CT of head with without contrast completed  stable 3-4 mm aneurysms involving the mid and distal left cavernous carotid artery  no acute intracranial abnormality   Neurosurgery appointment on 05/01  repeat CTA head ordered by Neurosurgery for 04/25/26  Continue to control blood pressures  Goal: <130/80    Chronic migraine without aura without status migrainosus, not intractable  Continues on topamax 100 mg po daily at  bedtime  No recent complaints of migraines  Appears stable     Pulmonary embolism (HCC)  History of PE and LE DVT  Started Eliquis treatment on 08/10/23  Continue eliquis 5 mg po BID  Recommend follow up with Pulmonologist      This is a 63 y.o. female seen today at [facility].  Medical history includes, not limited to, type 2 DM, polymyalgia rheumatica, CKD stage 3A, hypertension, hyperlipidemia, and vitamin D deficiency.    Resident is seen today for a discharge home.  Upon entering the room she is out of bed in her wheelchair.  Alert and oriented x 3.  She states things have been going well.  Her plan is to be discharged on Monday, her son and her plan to leave the area, move to [place] to be with a friend/family.  At time of visit she denies pain.  States she has been eating and drinking well.  Denies issues with bowel or bladder.      We discussed in depth the prescriptions were all written for her to fill at a pharmacy of her choice.  That all medications were supplied for 30 days.  That she should obtain a primary care physician to help follow along with her care and coordinate with her specialty doctors.  She states she always use to follow up with specialty doctors to obtain medications.  I informed her that it is sometimes difficult to get an appointment and she should see a PCP within the next 30 days for refills on her medications.  She is familiar with how to test her blood sugars and blood pressures.  She states that she used to have machines for both at home but no longer has them.  New scripts were supplied for a glucometer machine and supplies.       Of note on day of discharge staff was assisting helping to get shoes on, ulcer and drainage noted to left heel.  Nursing evaluated and recommended treatment which was refused by resident.        Reviewed resident with nursing staff.  Reviewed labs.           Nursing and prior provider notes reviewed on this visit. Discussed visit with PCP and  nursing staff/ supervisor.      Review of Systems   Constitutional:  Positive for activity change. Negative for appetite change, fatigue, fever and unexpected weight change.   HENT:  Negative for congestion.    Eyes:  Negative for pain, discharge and visual disturbance.        Eyeglasses   Respiratory:  Negative for cough and shortness of breath.    Cardiovascular:  Negative for chest pain and palpitations.   Gastrointestinal:  Negative for abdominal pain, constipation and diarrhea.   Genitourinary:  Negative for difficulty urinating, dysuria, hematuria and urgency.   Musculoskeletal:  Positive for arthralgias and gait problem.   Skin:  Negative for color change.   Neurological:  Negative for syncope and weakness.   Psychiatric/Behavioral:  Negative for confusion and sleep disturbance.    All other systems reviewed and are negative.         ALLERGY: Reviewed, unchanged  No Known Allergies    HISTORY:  Medical Hx: Reviewed, unchanged  Family Hx: Reviewed, unchanged  Soc Hx: Reviewed,  unchanged      Physical Exam  Vitals and nursing note reviewed.   Constitutional:       General: She is not in acute distress.     Appearance: Normal appearance. She is obese. She is not ill-appearing.   HENT:      Head: Normocephalic.      Right Ear: Tympanic membrane normal.      Left Ear: Tympanic membrane normal.      Nose: Nose normal. No congestion.      Mouth/Throat:      Mouth: Mucous membranes are moist.      Pharynx: Oropharynx is clear.   Eyes:      General:         Right eye: No discharge.         Left eye: No discharge.      Extraocular Movements: Extraocular movements intact.      Conjunctiva/sclera: Conjunctivae normal.      Pupils: Pupils are equal, round, and reactive to light.   Cardiovascular:      Rate and Rhythm: Normal rate and regular rhythm.      Heart sounds: Normal heart sounds.      Comments: Decreased pedal pulses  Pulmonary:      Effort: Pulmonary effort is normal. No respiratory distress.      Breath  sounds: Normal breath sounds.   Chest:      Chest wall: No tenderness.   Abdominal:      General: Bowel sounds are normal.      Palpations: Abdomen is soft.      Tenderness: There is no abdominal tenderness.   Musculoskeletal:         General: No swelling. Normal range of motion.      Cervical back: Normal range of motion.      Right lower leg: Edema present.      Left lower leg: Edema present.   Skin:     General: Skin is warm and dry.   Neurological:      Mental Status: She is alert and oriented to person, place, and time. Mental status is at baseline.      Motor: No weakness.   Psychiatric:         Mood and Affect: Mood normal.         Behavior: Behavior normal.         Thought Content: Thought content normal.         Judgment: Judgment normal.            Laboratory results / Imaging reviewed: Hard copy/ies in medical chart:    Vitals:    05/10/24 1055   BP: 152/74   Pulse: 72       Current Medications:  Current Outpatient Medications   Medication Instructions    acetaminophen (TYLENOL) 650 mg, Oral, Every 4 hours PRN    acetaminophen (TYLENOL) 650 mg, Rectal, Every 4 hours PRN    albuterol (PROVENTIL HFA,VENTOLIN HFA) 90 mcg/act inhaler 2 puffs, Every 6 hours PRN    amLODIPine (NORVASC) 5 mg, Oral, Daily    apixaban (ELIQUIS) 5 mg, Oral, 2 times daily    atorvastatin (LIPITOR) 40 mg, Oral, Daily    bisacodyl (DULCOLAX) 10 mg, Rectal, As needed    Blood Glucose Monitoring Suppl (OneTouch Verio Reflect) w/Device KIT 1 each, Does not apply, 4 times daily    Blood Pressure Monitor KIT Check blood pressures BID    cholecalciferol (VITAMIN D3) 2,000 Units, Oral, Daily    docusate sodium (COLACE) 100 mg, Oral, 2 times daily    ferrous sulfate 325 mg, Oral, Every other day with Breakfast    gabapentin (NEURONTIN) 100 mg, Oral, 2 times daily    hydrALAZINE (APRESOLINE) 10 mg, Oral, 3 times daily    insulin glargine (LANTUS) 18 Units, Subcutaneous, Every 12 hours scheduled    insulin lispro (HUMALOG KWIKPEN) 7 Units,  "Subcutaneous, 3 times daily with meals    Insulin Pen Needle (BD Pen Needle Tita 2nd Gen) 32G X 4 MM MISC For use with insulin pen. Pharmacy may dispense brand covered by insurance.    Insulin Pen Needle (BD Pen Needle Tita U/F) 32G X 4 MM MISC Use four times daily as directed with insulin pen    Lancets (OneTouch Delica Plus [id number]) MISC 1 each, Does not apply, 4 times daily    losartan (COZAAR) 100 mg, Oral, Daily    Magnesium Hydroxide (MILK OF MAGNESIA PO) 30 mL, Oral, As needed    Menthol, Topical Analgesic, (Biofreeze) 4 % GEL 1 Application, Apply externally, Every 6 hours PRN    metFORMIN (GLUCOPHAGE) 1,000 mg, Oral, 2 times daily with meals    metoprolol tartrate (LOPRESSOR) 100 mg, Oral, Every 12 hours scheduled    multivitamin-iron-minerals-folic acid (THERAPEUTIC-M) TABS tablet 1 tablet, Oral, Daily    predniSONE 4 mg, Oral, Daily    Sodium Phosphates (ENEMA RE) Rectal, As needed    topiramate (TOPAMAX) 100 mg, Oral, Daily at bedtime        Please note: This note was completed in part utilizing a voice-recognition software may have been used in the preparation of this document. Grammatical errors, random word insertion, spelling mistakes, and incomplete sentences may be an occasional consequence of the system secondary to software limitations, ambient noise and hardware issues. Occasional wrong word or \"sound-alike\" substitutions may have occurred due to the inherent limitations of voice recognition software. At the time of dictation, efforts were made to edit, clarify and/or correct errors. Interpretation should be guided by context. Please read the chart carefully and recognize, using context, where substitutions have occurred. If you have any questions or concerns about the context, text or information contained within the body of this dictation, please contact myself, the provider, for further clarification.    Time spent greater than 30 minutes with coordination of care, direct patient care, " patient examination, teaching, and chart review.    CONCETTA Jimenez  5/13/2024

## 2024-05-13 NOTE — ASSESSMENT & PLAN NOTE
Wound noted on left heel on day of discharge  Resident refused treatment to ulcer  Need to ensure proper footwear  Continue gabapentin 100 mg po BID  Continue education to resident with checking feet daily

## 2024-05-13 NOTE — ASSESSMENT & PLAN NOTE
History of PE and LE DVT  Started Eliquis treatment on 08/10/23  Continue eliquis 5 mg po BID  Recommend follow up with Pulmonologist

## 2024-05-13 NOTE — ASSESSMENT & PLAN NOTE
Had followed with [facility]  Wounds have healed  WBAT  Inspect bilateral feet daily  Avoid tight fitting shoes  Recommend being fitted for diabetic shoes  On day of discharge there was an ulcer on left heel noted by nursing staff, fluid filled blister and drainage  Resident refused treatment and still wanted to leave facility   Recommend following up with PCP and podiatrist

## 2024-05-13 NOTE — ASSESSMENT & PLAN NOTE
04/30/24 CT of head with without contrast completed  stable 3-4 mm aneurysms involving the mid and distal left cavernous carotid artery  no acute intracranial abnormality   Neurosurgery appointment on 05/01  repeat CTA head ordered by Neurosurgery for 04/25/26  Continue to control blood pressures  Goal: <130/80

## 2024-05-13 NOTE — ASSESSMENT & PLAN NOTE
Lab Results   Component Value Date    HGBA1C 10.2 (H) 08/24/2023     Recent HgA1c on 04/18/24 noted to be 8.2  Requires better control   Encourage CCD  Nursing staff that resident is non-compliant with diet  Reviewed blood sugars from the facility  Appears elevated  Increased insulin glargine and humalog  Insulin glargine 18 units SQ Q12 hours  Insulin lispro 7 units TID with meals  Continue to monitor  Avoid hypoglycemia   following orders are received by verbal communication from Brisa Velasco, Corey E Cat Chung    Orders include:  [id number] Chip Kolb will call to schedule.     Dr. Chris Hugo will call you with the results

## 2024-05-13 NOTE — ASSESSMENT & PLAN NOTE
Lab Results   Component Value Date    EGFR 78 09/28/2023    EGFR 74 09/26/2023    EGFR 79 09/25/2023    CREATININE 0.80 09/28/2023    CREATININE 0.83 09/26/2023    CREATININE 0.79 09/25/2023     Baseline creatinine 0.8-0.9  CMP on 05/01  Creatinine: 0.94  BUN: 22  eGFR: 67  Continue to monitor BMP periodically  Encourage fluids throughout the day  Avoid nephrotoxic medications

## 2024-05-13 NOTE — ASSESSMENT & PLAN NOTE
Cleared by podiatry to be WBAT after wounds on bilateral feet have healed   Encourage use of assistive device  Continue to use wheelchair  Goal to use walker  Continue fall precautions  Encourage patient to participate with activities  Provide education for patient, family, and caregivers

## 2024-05-13 NOTE — ASSESSMENT & PLAN NOTE
Moves around mainly in a wheelchair  Able to use walker  for short distances and if she is not carrying anything  Fall precautions  Recommend being fitted for diabetic shoes

## 2024-05-13 NOTE — ASSESSMENT & PLAN NOTE
Hemoglobin stable on 04/18 at 10.4 g/dL  TIBC profile checked on 04/29/24  Iron: 36  Transferrin: 171  % saturation: 15  TIBC: 239  Continue ferrous sulfate 325 mg every other day  Follow up with PCP

## 2024-05-13 NOTE — ASSESSMENT & PLAN NOTE
Stable  Continues on prednisone 4 mg po daily  Is not agreeable to dose reduction at this time  Was concerned that increased WBC, elevated blood sugars, and increase in recent Hg A1c is a result of chronic prednisone use   Recommend follow up with Rheumatologist

## 2024-05-13 NOTE — ASSESSMENT & PLAN NOTE
Continue atorvastatin 40 mg po daily  Encourage heart healthy diet  Recommend follow up with PCP to monitor lab work

## 2024-05-13 NOTE — ASSESSMENT & PLAN NOTE
No shortness of breath, not in exacerbation  Continue albuterol inhaler Q6h prn   Non-compliance in the past with Advair where she wasn't taking and stated she did not need it  Follow up with Pulmonologist

## 2024-05-15 NOTE — CASE MANAGEMENT
Case Management Discharge Planning Note    Patient name Joselin Alfonso  Location [place] [facility] Farzana Cisneros* MRN [id number]  : 1960 Date 2023               OBJECTIVE:  Risk of Unplanned Readmission Score: 49.76         Current admission status: Inpatient   Preferred Pharmacy:   [place] [address], [address]  [address] [place] [id number]  Phone: [phone] Fax: [phone]    [address] [place] [address] [address] [facility],  [address] [facility],  [address] [place] [address]  Phone: [phone] Fax: [phone]    Primary Care Provider: Daylin Heard MD    Primary Insurance: [facility] DUAL COMPLETE [facility] HOSPITAL REP  Secondary Insurance: [facility]                                               Would you like to participate in our [id number] JobApp service program?  : No - Declined    Treatment Team Recommendation: Short Term Rehab  Discharge Destination Plan[de-identified] Short Term Rehab, SNF [facility])                                         Additional Comments: Request to  Discharge Support for prior auth initiation for [facility] . None

## 2024-05-30 NOTE — ASSESSMENT & PLAN NOTE
strike-out   2/6/2024 10:50 270.2 Lbs Standing lkrum (Manual)    strike-out   1/30/2024 11:24 269.8 Lbs Standing smelo (Manual)    strike-out   1/23/2024 11:23 270.0 Lbs Standing bsheriff (Manual)    strike-out   1/16/2024 11:47 270.4 Lbs Standing snolasco1 (Manual)    strike-out   1/9/2024 14:55 269.5 Lbs Standing jrothwell (Manual)    strike-out   1/1/2024 14:49 270.0 Lbs Standing pshafnisky (Manual)        Has grade 1 diastolic dysfunction noted on echo  Monitor routine weight - fairly stable, no alarming uptrend  Monitor volume status clinically - has some peripheral edema but seemingly stable, no orthopnea or respiratory symptoms or evidence of fluid overload otherwise clinically  Encourage compression, elevation  Not presently on diuretic; if edema worsens could consider addition of diuretic or for example changing her BP regimen to include HCTZ if renal function allows  Continue to monitor     Quality 130: Documentation Of Current Medications In The Medical Record: Current Medications Documented Quality 47: Advance Care Plan: Advance Care Planning discussed and documented; advance care plan or surrogate decision maker documented in the medical record. Detail Level: Detailed Quality 110: Preventive Care And Screening: Influenza Immunization: Influenza Immunization Administered during Influenza season Quality 111:Pneumonia Vaccination Status For Older Adults: Patient received any pneumococcal conjugate or polysaccharide vaccine on or after their 60th birthday and before the end of the measurement period

## 2024-07-24 NOTE — ASSESSMENT & PLAN NOTE
[facility]     Reason for call: Lab Result Notification     Lab Result (including Rx patient on, if applicable).  If culture, copy of lab report at bottom.  Lab Result: Urine Culture - Preliminary - See Below    ED Rx: No antibiotic prescribed    Creatinine Level (mg/dl)   Creatinine   Date Value Ref Range Status   07/23/2024 0.91 0.51 - 0.95 mg/dL Final    Creatinine clearance (ml/min), if applicable    Serum creatinine: 0.91 mg/dL 07/23/24 0100  Estimated creatinine clearance: 124.7 mL/min     RN Recommendations/Instructions per [place] ED lab result protocol:   [facility] ED lab result protocol utilized: Urine Culture    Unable to reach patient/caregiver. Unable to leave a message. The voicemail message is a male's voice. Will send a letter when the culture is finalized.          LANDON ARELLANO, RN   Stable  Cont atorvastatin 40 mg 1 tab po QHS

## 2025-01-22 NOTE — ASSESSMENT & PLAN NOTE
· Patient has acute pulmonary embolism and lower extremity DVT  · Outpatient venous duplex showed acute non occlusive DVT in the left popliteal vein and in the posterior tibial veins  · ECHO stable from previous  · Continue on eliquis 10 mg BID 5/7 days then continue on eliquis 5 mg BID [] : right long arm cast [de-identified] : Xeroform was applied to areas of skin injury about the volar forearm.

## 2025-04-24 ENCOUNTER — DOCUMENTATION (OUTPATIENT)
Dept: ADMINISTRATIVE | Facility: OTHER | Age: 65
End: 2025-04-24

## 2025-04-24 NOTE — PROGRESS NOTES
04/24/25 7:52 AM    CRC outreach is not required, patient is living in a nursing home/ long term care facility/ other facility.     Thank you.  Natali Reed  [facility]

## 2025-05-01 NOTE — ASSESSMENT & PLAN NOTE
Continued Stay Note  [facility]     Patient Name: Chey Robertson  MRN: [id number]  Today's Date: 10/24/2024    Admit Date: 10/18/2024    Plan: [facility]   Discharge Plan       Row Name 10/24/24 0918       Plan    Plan [facility]    Patient/Family in Agreement with Plan yes    Plan Comments Spoke with Charlotte at [facility].  They are still waiting on insurance approval.  CM will continue to follow.    Final Discharge Disposition Code 03 - skilled nursing facility (SNF)                   Discharge Codes    No documentation.                 Expected Discharge Date and Time       Expected Discharge Date Expected Discharge Time    Oct 25, 2024               Diana Navarro RN     · Patient notes that since leaving short-term rehab she has remained in her wheelchair and not ambulated   · She notes she has become progressively weaker due to this   · PT/OT lashay appreciated:   · Patient will be discharged to short-term rehab: awaiting for auth No

## (undated) DEVICE — GLOVE INDICATOR PI UNDERGLOVE SZ 8 BLUE

## (undated) DEVICE — LIGACLIP MCA MULTIPLE CLIP APPLIERS, 20 SMALL CLIPS: Brand: LIGACLIP

## (undated) DEVICE — UNDYED MONOFILAMENT (POLYDIOXANONE), ABSORBABLE SYNTHETIC SURGICAL SUTURE: Brand: PDS

## (undated) DEVICE — BETHLEHEM UNIVERSAL OUTPATIENT: Brand: CARDINAL HEALTH

## (undated) DEVICE — PAD GROUNDING ADULT

## (undated) DEVICE — TIBURON SPLIT SHEET: Brand: CONVERTORS

## (undated) DEVICE — PENCIL ELECTROSURG E-Z CLEAN -0035H

## (undated) DEVICE — GLOVE SRG BIOGEL 7.5

## (undated) DEVICE — INTENDED FOR TISSUE SEPARATION, AND OTHER PROCEDURES THAT REQUIRE A SHARP SURGICAL BLADE TO PUNCTURE OR CUT.: Brand: BARD-PARKER SAFETY BLADES SIZE 15, STERILE

## (undated) DEVICE — POV-IOD SWAB STICKS

## (undated) DEVICE — 3M™ STERI-STRIP™ REINFORCED ADHESIVE SKIN CLOSURES, R1542, 1/4 IN X 1-1/2 IN (6 MM X 38 MM), 6 STRIPS/ENVELOPE: Brand: 3M™ STERI-STRIP™

## (undated) DEVICE — ADHESIVE SKIN HIGH VISCOSITY EXOFIN 1ML

## (undated) DEVICE — SUT SILK 4-0 18 IN A183H